# Patient Record
Sex: FEMALE | Race: WHITE | NOT HISPANIC OR LATINO | Employment: OTHER | URBAN - METROPOLITAN AREA
[De-identification: names, ages, dates, MRNs, and addresses within clinical notes are randomized per-mention and may not be internally consistent; named-entity substitution may affect disease eponyms.]

---

## 2017-01-03 ENCOUNTER — OFFICE VISIT (OUTPATIENT)
Dept: HEMATOLOGY ONCOLOGY | Facility: MEDICAL CENTER | Age: 77
End: 2017-01-03
Payer: MEDICARE

## 2017-01-03 ENCOUNTER — OUTPATIENT INFUSION SERVICES (OUTPATIENT)
Dept: ONCOLOGY | Facility: MEDICAL CENTER | Age: 77
End: 2017-01-03
Attending: INTERNAL MEDICINE
Payer: MEDICARE

## 2017-01-03 VITALS
SYSTOLIC BLOOD PRESSURE: 171 MMHG | TEMPERATURE: 98.2 F | OXYGEN SATURATION: 96 % | RESPIRATION RATE: 18 BRPM | WEIGHT: 167.77 LBS | HEART RATE: 93 BPM | BODY MASS INDEX: 27.95 KG/M2 | HEIGHT: 65 IN | DIASTOLIC BLOOD PRESSURE: 81 MMHG

## 2017-01-03 VITALS
HEIGHT: 65 IN | SYSTOLIC BLOOD PRESSURE: 142 MMHG | TEMPERATURE: 97.7 F | OXYGEN SATURATION: 98 % | HEART RATE: 107 BPM | DIASTOLIC BLOOD PRESSURE: 82 MMHG | BODY MASS INDEX: 28.62 KG/M2 | RESPIRATION RATE: 16 BRPM | WEIGHT: 171.8 LBS

## 2017-01-03 VITALS
BODY MASS INDEX: 27.95 KG/M2 | TEMPERATURE: 98.2 F | DIASTOLIC BLOOD PRESSURE: 69 MMHG | HEIGHT: 65 IN | WEIGHT: 167.77 LBS | RESPIRATION RATE: 18 BRPM | SYSTOLIC BLOOD PRESSURE: 137 MMHG | OXYGEN SATURATION: 96 % | HEART RATE: 82 BPM

## 2017-01-03 DIAGNOSIS — C20 RECTAL CANCER (HCC): ICD-10-CM

## 2017-01-03 DIAGNOSIS — C18.9 MALIGNANT NEOPLASM OF COLON, UNSPECIFIED PART OF COLON (HCC): ICD-10-CM

## 2017-01-03 DIAGNOSIS — C78.7 SECONDARY MALIGNANT NEOPLASM OF LIVER (HCC): ICD-10-CM

## 2017-01-03 DIAGNOSIS — R94.4 DECREASED GFR: ICD-10-CM

## 2017-01-03 LAB
ALBUMIN SERPL BCP-MCNC: 3.9 G/DL (ref 3.2–4.9)
ALBUMIN/GLOB SERPL: 1.6 G/DL
ALP SERPL-CCNC: 106 U/L (ref 30–99)
ALT SERPL-CCNC: 21 U/L (ref 2–50)
ANION GAP SERPL CALC-SCNC: 9 MMOL/L (ref 0–11.9)
AST SERPL-CCNC: 22 U/L (ref 12–45)
BASOPHILS # BLD AUTO: 1.2 % (ref 0–1.8)
BASOPHILS # BLD: 0.03 K/UL (ref 0–0.12)
BILIRUB SERPL-MCNC: 0.6 MG/DL (ref 0.1–1.5)
BUN SERPL-MCNC: 15 MG/DL (ref 8–22)
CALCIUM SERPL-MCNC: 9.8 MG/DL (ref 8.5–10.5)
CEA SERPL-MCNC: 3.3 NG/ML (ref 0–3)
CHLORIDE SERPL-SCNC: 108 MMOL/L (ref 96–112)
CO2 SERPL-SCNC: 22 MMOL/L (ref 20–33)
CREAT SERPL-MCNC: 0.99 MG/DL (ref 0.5–1.4)
EOSINOPHIL # BLD AUTO: 0.12 K/UL (ref 0–0.51)
EOSINOPHIL NFR BLD: 4.7 % (ref 0–6.9)
ERYTHROCYTE [DISTWIDTH] IN BLOOD BY AUTOMATED COUNT: 50.4 FL (ref 35.9–50)
GFR SERPL CREATININE-BSD FRML MDRD: 54 ML/MIN/1.73 M 2
GLOBULIN SER CALC-MCNC: 2.4 G/DL (ref 1.9–3.5)
GLUCOSE SERPL-MCNC: 125 MG/DL (ref 65–99)
HCT VFR BLD AUTO: 39.8 % (ref 37–47)
HGB BLD-MCNC: 13.1 G/DL (ref 12–16)
LYMPHOCYTES # BLD AUTO: 0.82 K/UL (ref 1–4.8)
LYMPHOCYTES NFR BLD: 32.2 % (ref 22–41)
MCH RBC QN AUTO: 30.6 PG (ref 27–33)
MCHC RBC AUTO-ENTMCNC: 32.9 G/DL (ref 33.6–35)
MCV RBC AUTO: 93 FL (ref 81.4–97.8)
MONOCYTES # BLD AUTO: 0.49 K/UL (ref 0–0.85)
MONOCYTES NFR BLD AUTO: 19.2 % (ref 0–13.4)
NEUTROPHILS # BLD AUTO: 1.09 K/UL (ref 2–7.15)
NEUTROPHILS NFR BLD: 42.7 % (ref 44–72)
PLATELET # BLD AUTO: 153 K/UL (ref 164–446)
PMV BLD AUTO: 9.6 FL (ref 9–12.9)
POTASSIUM SERPL-SCNC: 3.5 MMOL/L (ref 3.6–5.5)
PROT SERPL-MCNC: 6.3 G/DL (ref 6–8.2)
RBC # BLD AUTO: 4.28 M/UL (ref 4.2–5.4)
SODIUM SERPL-SCNC: 139 MMOL/L (ref 135–145)
WBC # BLD AUTO: 2.6 K/UL (ref 4.8–10.8)

## 2017-01-03 PROCEDURE — A9270 NON-COVERED ITEM OR SERVICE: HCPCS | Performed by: NURSE PRACTITIONER

## 2017-01-03 PROCEDURE — 99213 OFFICE O/P EST LOW 20 MIN: CPT | Performed by: NURSE PRACTITIONER

## 2017-01-03 PROCEDURE — G0498 CHEMO EXTEND IV INFUS W/PUMP: HCPCS

## 2017-01-03 PROCEDURE — 700111 HCHG RX REV CODE 636 W/ 250 OVERRIDE (IP): Performed by: NURSE PRACTITIONER

## 2017-01-03 PROCEDURE — 700111 HCHG RX REV CODE 636 W/ 250 OVERRIDE (IP)

## 2017-01-03 PROCEDURE — 96368 THER/DIAG CONCURRENT INF: CPT

## 2017-01-03 PROCEDURE — 96367 TX/PROPH/DG ADDL SEQ IV INF: CPT

## 2017-01-03 PROCEDURE — 82378 CARCINOEMBRYONIC ANTIGEN: CPT

## 2017-01-03 PROCEDURE — 700102 HCHG RX REV CODE 250 W/ 637 OVERRIDE(OP): Performed by: NURSE PRACTITIONER

## 2017-01-03 PROCEDURE — 96375 TX/PRO/DX INJ NEW DRUG ADDON: CPT

## 2017-01-03 PROCEDURE — A4212 NON CORING NEEDLE OR STYLET: HCPCS

## 2017-01-03 PROCEDURE — 96409 CHEMO IV PUSH SNGL DRUG: CPT

## 2017-01-03 PROCEDURE — 85025 COMPLETE CBC W/AUTO DIFF WBC: CPT

## 2017-01-03 PROCEDURE — 80053 COMPREHEN METABOLIC PANEL: CPT

## 2017-01-03 PROCEDURE — 306637 HCHG MISC ORTHO ITEM RC 0274

## 2017-01-03 RX ORDER — DEXTROSE MONOHYDRATE 50 MG/ML
INJECTION, SOLUTION INTRAVENOUS CONTINUOUS
Status: DISCONTINUED | OUTPATIENT
Start: 2017-01-03 | End: 2017-01-03 | Stop reason: HOSPADM

## 2017-01-03 RX ORDER — DEXAMETHASONE SODIUM PHOSPHATE 4 MG/ML
8 INJECTION, SOLUTION INTRA-ARTICULAR; INTRALESIONAL; INTRAMUSCULAR; INTRAVENOUS; SOFT TISSUE ONCE
Status: COMPLETED | OUTPATIENT
Start: 2017-01-03 | End: 2017-01-03

## 2017-01-03 RX ORDER — POTASSIUM CHLORIDE 1.5 G/1.58G
40 POWDER, FOR SOLUTION ORAL ONCE
Status: COMPLETED | OUTPATIENT
Start: 2017-01-03 | End: 2017-01-03

## 2017-01-03 RX ADMIN — LEUCOVORIN CALCIUM 748 MG: 350 INJECTION, POWDER, LYOPHILIZED, FOR SOLUTION INTRAMUSCULAR; INTRAVENOUS at 10:36

## 2017-01-03 RX ADMIN — POTASSIUM CHLORIDE 40 MEQ: 1.5 POWDER, FOR SOLUTION ORAL at 10:28

## 2017-01-03 RX ADMIN — DEXAMETHASONE SODIUM PHOSPHATE 8 MG: 4 INJECTION, SOLUTION INTRAMUSCULAR; INTRAVENOUS at 10:25

## 2017-01-03 RX ADMIN — HEPARIN 500 UNITS: 100 SYRINGE at 08:37

## 2017-01-03 RX ADMIN — DEXTROSE MONOHYDRATE: 50 INJECTION, SOLUTION INTRAVENOUS at 09:56

## 2017-01-03 RX ADMIN — FLUOROURACIL 3590 MG: 50 INJECTION, SOLUTION INTRAVENOUS at 12:55

## 2017-01-03 RX ADMIN — FLUOROURACIL 600 MG: 50 INJECTION, SOLUTION INTRAVENOUS at 11:58

## 2017-01-03 ASSESSMENT — ENCOUNTER SYMPTOMS
FEVER: 0
SHORTNESS OF BREATH: 1
COUGH: 0
WEIGHT LOSS: 0
VOMITING: 0
DIARRHEA: 0
TINGLING: 1
NAUSEA: 0
PALPITATIONS: 0
CONSTIPATION: 0
HEADACHES: 0
DIZZINESS: 0
BACK PAIN: 0
WHEEZING: 0
INSOMNIA: 0
CHILLS: 0

## 2017-01-03 NOTE — MR AVS SNAPSHOT
"        Karlene Gilbert Romeo   1/3/2017 9:00 AM   Office Visit   MRN: [id number]    Department:  [facility]   Dept Phone:  [phone]    Description:  Female : 1940   Provider:  Ella De A.P.N.           Reason for Visit     Follow-Up           Allergies as of 1/3/2017     Allergen Noted Reactions    Codeine 2011       \"gets drunk\"    Oxaliplatin 10/27/2015   Anaphylaxis    Pcn [Penicillins] 2011   Itching    Sulfa Drugs 2011   Itching    Tape 2013   Rash    PAPER TAPE OK      You were diagnosed with     Rectal cancer (HCC)   [848715]       Secondary malignant neoplasm of liver (HCC)   [197.7.ICD-9-CM]         Vital Signs     Blood Pressure Pulse Temperature Respirations Height Weight    142/82 mmHg 107 36.5 °C (97.7 °F) 16 1.651 m (5' 5\") 77.928 kg (171 lb 12.8 oz)    Body Mass Index Oxygen Saturation Smoking Status             28.59 kg/m2 98% Never Smoker          Basic Information     Date Of Birth Sex Race Ethnicity Preferred Language    1940 Female White Non- English      Your appointments     2017 10:00 AM   Est Chemo 3 with RN 2   Infusion Services ([facility])    [address]  [place] [address]   [phone]            2017 10:30 AM   FOLLOW UP with Ahsan Haque M.D.   [facility] (--)    [address] [place] [address] [address]  [place] [address]   [phone]            2017 10:30 AM   EST Port Flush / Central Line Care with INFUSION QUICK INJECT   Infusion Services ([facility])    [address] [facility] [address]  [place] [address]   [phone]            2017  9:00 AM   Est Chemo 3 with RN 1   Infusion Services ([facility])    [address]  [place] [address]   [phone]            2017 10:00 AM   EST Port Flush / Central Line Care with INFUSION QUICK INJECT   Infusion Services ([facility])    [address]  [place] [address]   [phone]           "    Jan 31, 2017  9:00 AM   Lab Draw with INFUSION QUICK INJECT   Infusion Services ([facility])    [address] [facility] [address]  [place] [address]   [phone]            Jan 31, 2017  9:40 AM   ONCOLOGY EST PATIENT 30 MIN with Radha Cotter M.D.   [facility] (--)    [address], [address]  [facility] [address]   [phone]            Jan 31, 2017 10:30 AM   Est Chemo 3 with RN 4   Infusion Services ([facility])    [address]  [place] [address]   [phone]            Feb 02, 2017 11:00 AM   EST Port Flush / Central Line Care with INFUSION QUICK INJECT   Infusion Services ([facility])    [address] [address]   [phone]            Feb 14, 2017  9:30 AM   Est Chemo 3 with RN 4   Infusion Services ([facility])    [address] [address]   [phone]            Feb 16, 2017 10:30 AM   EST Port Flush / Central Line Care with INFUSION QUICK INJECT   Infusion Services ([facility])    [address]  [place] [address]   [phone]            Feb 28, 2017  9:00 AM   Lab Draw with INFUSION QUICK INJECT   Infusion Services ([facility])    [address] [address]   [phone]            Feb 28, 2017  9:40 AM   ONCOLOGY EST PATIENT 30 MIN with Radha Cotter M.D.   [facility] (--)    [address], [address]  [facility] [address]   [phone]            Feb 28, 2017 10:30 AM   Est Chemo 3 with RN 4   Infusion Services ([facility])    [address] [place] [address]   [phone]            Mar 02, 2017 11:30 AM   EST Port Flush / Central Line Care with INFUSION QUICK INJECT   Infusion Services ([facility])    [address]  [facility] [address]   [phone]            Mar 14, 2017  9:30 AM   Est Chemo 3 with RN 6   Infusion Services ([address])    [address] [place] [address]   [phone]            Mar 16, 2017 10:30 AM   EST Port Flush / Central Line Care with INFUSION QUICK  INJECT   Infusion Services ([facility])    [address] [facility] [address]  [place] [address]   [phone]            Mar 22, 2017  2:30 PM   ANNUAL EXAM PREVENTATIVE with Manan Quiñonez M.D.   [facility] [address]    [address]  [place] [address]   [phone]            Mar 28, 2017  8:30 AM   Lab Draw with INFUSION QUICK INJECT   Infusion Services ([facility])    [address] [facility] [address]  [place] [address]   [phone]            Mar 28, 2017  9:00 AM   ONCOLOGY EST PATIENT 30 MIN with Radha Cotter M.D.   [facility] (--)    [address]  [place] [address]   [phone]            Mar 28, 2017 10:00 AM   Est Chemo 3 with RN 2   Infusion Services ([facility])    [address] [facility] [address]  [place] [address]   [phone]            Mar 30, 2017 11:00 AM   EST Port Flush / Central Line Care with INFUSION QUICK INJECT   Infusion Services ([facility])    [address] [facility] [address]  [place] [address]   [phone]              Problem List              ICD-10-CM Priority Class Noted - Resolved    Rectal bleeding K62.5   9/20/2012 - Present    Rectal cancer (HCC) C20   9/26/2012 - Present    Hypertension I10 Medium  10/1/2012 - Present    Anemia D64.9   10/1/2012 - Present    Hypoalbuminemia E88.09   10/1/2012 - Present    Ileostomy care (HCC) Z43.2   10/13/2012 - Present    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9   8/24/2015 - Present    Diarrhea R19.7   8/24/2015 - Present    Hyperlipidemia E78.5   9/17/2015 - Present    Lightheadedness R42   9/17/2015 - Present    Shortness of breath R06.02 High  10/5/2015 - Present    Routine general medical examination at a health care facility Z00.00   3/14/2016 - Present    Renal insufficiency N28.9   3/14/2016 - Present    Hyperglycemia R73.9   3/14/2016 - Present    Decreased GFR R94.4   8/16/2016 - Present    Chemotherapy-induced neutropenia (HCC) D70.1   9/28/2016 - Present      Health Maintenance        Date Due Completion Dates    IMM DTaP/Tdap/Td Vaccine (1 - Tdap) 4/13/1959 ---    COLONOSCOPY 4/13/1990 ---    BONE DENSITY 4/13/2005 ---    IMM PNEUMOCOCCAL 65+ (ADULT) LOW/MEDIUM RISK SERIES (2 of 2 - PCV13) 9/5/2007 9/5/2006    IMM INFLUENZA (1) 9/1/2016 10/18/2014, 9/5/2013, 10/12/2012            Current Immunizations     Influenza TIV (IM) 9/5/2013  6:12 AM, 10/12/2012  6:09 PM    Influenza Vaccine Quad Inj (Preserved) 10/18/2014    Pneumococcal polysaccharide vaccine (PPSV-23) 9/5/2006    SHINGLES VACCINE 1/14/2015      Below and/or attached are the medications your provider expects you to take. Review all of your home medications and newly ordered medications with your provider and/or pharmacist. Follow medication instructions as directed by your provider and/or pharmacist. Please keep your medication list with you and share with your provider. Update the information when medications are discontinued, doses are changed, or new medications (including over-the-counter products) are added; and carry medication information at all times in the event of emergency situations     Allergies:  CODEINE - (reactions not documented)     OXALIPLATIN - Anaphylaxis     PCN - Itching     SULFA DRUGS - Itching     TAPE - Rash               Medications  Valid as of: January 03, 2017 -  9:49 AM    Generic Name Brand Name Tablet Size Instructions for use     Cholecalciferol (Cap) Vitamin D3 400 UNITS Take 1 Cap by mouth every day.        Cyanocobalamin (Tab) VITAMIN B-12 500 MCG Take 500 mcg by mouth every day.        Famotidine (Tab) PEPCID 40 MG TAKE ONE TABLET EVERY DAY        Ibuprofen (Tab) MOTRIN 200 MG Take 200 mg by mouth every 6 hours as needed.        Lidocaine-Prilocaine (Cream) EMLA 2.5-2.5 % APPLY A THICK FILM OVER THE PORT ACCESS SITE PRIOR TO ACCESS        Loperamide HCl (Liquid) IMODIUM 1 MG/5ML Take 2 mg by mouth 4 times a day as needed for Diarrhea.        Loratadine (Tab) CLARITIN 10 MG Take 10 mg by mouth every day. Indications: Hayfever        Multiple Vitamins-Minerals   Take  by mouth.        Ondansetron HCl (Tab) ZOFRAN 4 MG         OxyCODONE HCl (Tab) ROXICODONE 5 MG Take 5 mg by mouth every four hours as needed for Severe Pain.        RaNITidine HCl (Tab) ZANTAC 150 MG TAKE ONE TABLET TWO TIMES A DAY        Rivaroxaban (Tab) XARELTO 20 MG Take 1 Tab by mouth with dinner.        .                 Medicines prescribed today were sent to:     [facility] - [place], [place] - [address]    [address] [facility] [address]    Phone: [phone] Fax: [phone]    Open 24 Hours?: No    [id number] - [place], [place] - [address]    [address] [facility] [address] [place] [address]    Phone: [phone] Fax: [phone]    Open 24 Hours?: No    [facility] - [place], [place] - [address]    [address] [facility] [place] [address]    Phone: [phone] Fax: [phone]    Open 24 Hours?: No      Medication refill instructions:       If your prescription bottle indicates you have medication refills left, it is not necessary to call your provider’s office. Please contact your pharmacy and they will refill your medication.    If your prescription bottle indicates you do not have any refills left, you may request refills at any time through one of the following ways: The online payworks system (except  Urgent Care), by calling your provider’s office, or by asking your pharmacy to contact your provider’s office with a refill request. Medication refills are processed only during regular business hours and may not be available until the next business day. Your provider may request additional information or to have a follow-up visit with you prior to refilling your medication.   *Please Note: Medication refills are assigned a new Rx number when refilled electronically. Your pharmacy may indicate that no refills were authorized even though a new prescription for the same medication is available at the pharmacy. Please request the medicine by name with the pharmacy before contacting your provider for a refill.        Referral     A referral request has been sent to our patient care coordination department. Please allow 3-5 business days for us to process this request and contact you either by phone or mail. If you do not hear from us by the 5th business day, please call us at [phone].           [employer] Access Code: [email or web address]  Expires: 1/5/2017  8:36 AM    [employer]  A secure, online tool to manage your health information     [employer]’s [employer]® is a secure, online tool that connects you to your personalized health information from the privacy of your home -- day or night - making it very easy for you to manage your healthcare. Once the activation process is completed, you can even access your medical information using the [employer] bart, which is available for free in the Apple Bart store or Google Play store.     [employer] provides the following levels of access (as shown below):   My Chart Features   [facility] Doctor [facility]  Specialists [facility]  Urgent  Care Non-[facility]  Primary Care  Doctor   Email your healthcare team securely and privately 24/7 X X X    Manage appointments: schedule your next appointment; view details of past/upcoming appointments X      Request prescription refills. X      View  recent personal medical records, including lab and immunizations X X X X   View health record, including health history, allergies, medications X X X X   Read reports about your outpatient visits, procedures, consult and ER notes X X X X   See your discharge summary, which is a recap of your hospital and/or ER visit that includes your diagnosis, lab results, and care plan. X X       How to register for [employer]:  1. Go to  [email or web address].  2. Click on the Sign Up Now box, which takes you to the New Member Sign Up page. You will need to provide the following information:  a. Enter your [employer] Access Code exactly as it appears at the top of this page. (You will not need to use this code after you’ve completed the sign-up process. If you do not sign up before the expiration date, you must request a new code.)   b. Enter your date of birth.   c. Enter your home email address.   d. Click Submit, and follow the next screen’s instructions.  3. Create a [employer] ID. This will be your [employer] login ID and cannot be changed, so think of one that is secure and easy to remember.  4. Create a [employer] password. You can change your password at any time.  5. Enter your Password Reset Question and Answer. This can be used at a later time if you forget your password.   6. Enter your e-mail address. This allows you to receive e-mail notifications when new information is available in [employer].  7. Click Sign Up. You can now view your health information.    For assistance activating your [employer] account, call [phone]

## 2017-01-03 NOTE — PROGRESS NOTES
Pt here for labs before chemo.  Port accessed in sterile field, labs drawn.  Confirmed with APN Alsop to draw CBC, CMP, CEA.  Port flushed per policy and remains accessed for chemo later. Pt left IC, will return after MD simpson

## 2017-01-03 NOTE — PROGRESS NOTES
Pt here for 5 FU and pump hook up.  Pt is now using [facility] pump with IC RN hooking up continuous pump.  Labs from earlier reviewed with APN Alsop.  K replaced with potassium powder as pills go through pt ostomy.  Leucovorin infused with 5 FU push during infusion.  Pt hooked up to new pump, confirmed pump is running.  Pt states she will drop off her [employer] pump tomorrow.  Pt left IC with .  Next apt confirmed.

## 2017-01-03 NOTE — PROGRESS NOTES
"Pharmacy Chemotherapy Verification:    Patient Name: Karlene Walters   Dx: Colon Cancer    Protocol: 5 FU/Leucovorin     Leucovorin 400 mg/m2 IV over 2 hours on day 1 followed by  Fluorouracil 400 mg/m2 IV puse on day 1 followed by   Dose reduced by 20%(320 mg/m2) starting Cycle 18 ([facility] #11) 7/5/16 due to neutropenia   Fluorouracil 1200 mg/m2 IV continuous infusion daily on days 1 and 2 (2400 mg/m2 IV over 46-48 hours)    Infusion Dose reduced by 20% starting Cycle 18 ([facility] #11)  7/5/16 due to neutropenia    14 day cycles for 12 cycles (adjuvant) or until disease progression or unacceptable toxicity  NCCN Guidelines for Colon Cancer.V.2.2015  Jay T, et al. Eur J Cancer.1999;35(9):1343-7    Allergies:  Codeine; Pcn; Sulfa drugs; and Tape     /69 mmHg  Pulse 82  Temp(Src) 36.8 °C (98.2 °F)  Resp 18  Ht 1.651 m (5' 5\")  Wt 76.1 kg (167 lb 12.3 oz)  BMI 27.92 kg/m2  SpO2 96% Body surface area is 1.87 meters squared.     Labs 1/3/17  ANC~ 1090 Plt = 153k   Hgb = 13.1   SCr = 0.99 mg/dL CrCl ~ 58 mL/min   12/5/16: LFT's = WNL except  T bili = 0.6      Drug Order   (Drug name, dose, route, IV Fluid & volume, frequency, number of doses) Cycle: 22   Previous treatment: 12/20/16     Medication = Leucovorin  Base Dose= 400 mg/m2  Calc Dose: Base Dose x 1.87m2 = 748 mg  Final Dose = 748 mg  Route = IV  Fluid & Volume = D5W 250 mL  Admin Duration = Over 2 hours          <5% difference, OK to treat with final dose   Medication = Fluorouracil  Base Dose= 320 mg/m2   Calc Dose: Base Dose x 1.87m2 = 598.4mg  Final Dose = 600mg  Route = IV push  Conc & Volume = 50 mg/mL = 12mL  Admin Duration = Over 5 minutes   Given 1 hour into leucovorin infusion       <5% difference, OK to treat with final dose   Medication = Fluorouracil  Base Dose= 1920 mg/m2  Calc Dose:Base Dose x 1.87 = 3590.4 mg  Final Dose = 3590  Route = IV  Fluid & Volume = 71.8 mL undiluted drug  Admin Duration = Over 46 hours   In CADD " cassette for home CIVI       <5% difference, OK to treat with final dose       By my signature below, I confirm this process was performed independently with the BSA and all final chemotherapy dosing calculations congruent. I have reviewed the above chemotherapy order and that my calculation of the final dose and BSA (when applicable) corroborate those calculations of the  pharmacist. Discrepancies of 5% or greater in the written dose have been addressed and documented within the EPIC Progress notes.    Andre Paz, MigueD

## 2017-01-03 NOTE — PROGRESS NOTES
"Subjective:      Karlene Walters is a 76 y.o. female who presents for Follow-Up for rectal cancer.         HPI    Patient seen today in follow up for rectal cancer mets to liver.  She is here for cycle 22, day 1 of single agent 5FU.  Patient is accompanied by her  for today's visit.     Saw Dr. ROJAS on Dec. 20th. Recommendation to have scans every 6 months of her liver.     Fever - None  Chills - None  Appetite - Appetite has been good. She is eating well. Her weight is stable.   Fatigue - None. She does feel like she is getting better and is not getting tired as often. She is doing light exercise about 3 times a day at about 5 minutes at a time.   N/V - None  Constipation/Diarrhea - Normal BMs per patient's routine. Her output is stable via ostomy. She uses Imodium as needed.   Pain - None. Pain in her hip has resolved at this time.   Neuropathy - Very mild in her toes or feet at times, but it is intermittent.   Other - Denies mouth sores. Very mild cracking and peeling of the skin around her fingers and erythema noted to her hands. But it is stable.     Her BP has been elevated today at the infusion center at 171/81. Today in the office it is 142/82. She states she is monitoring yifan BPs at home and they have been around 130/70s.     Allergies   Allergen Reactions   • Codeine      \"gets drunk\"   • Oxaliplatin Anaphylaxis   • Pcn [Penicillins] Itching   • Sulfa Drugs Itching   • Tape Rash     PAPER TAPE OK     Current Outpatient Prescriptions on File Prior to Visit   Medication Sig Dispense Refill   • famotidine (PEPCID) 40 MG Tab TAKE ONE TABLET EVERY DAY 90 Tab 1   • ranitidine (ZANTAC) 150 MG Tab TAKE ONE TABLET TWO TIMES A DAY 60 Tab 5   • rivaroxaban (XARELTO) 20 MG Tab tablet Take 1 Tab by mouth with dinner. 30 Tab 6   • cyanocobalamin (VITAMIN B-12) 500 MCG Tab Take 500 mcg by mouth every day.     • Multiple Vitamins-Minerals (CENTRUM SILVER PO) Take  by mouth.     • Cholecalciferol (VITAMIN D3) 400 " "UNITS CAPS Take 1 Cap by mouth every day.     • loratadine (CLARITIN) 10 MG TABS Take 10 mg by mouth every day. Indications: Hayfever     • lidocaine-prilocaine (EMLA) 2.5-2.5 % Cream APPLY A THICK FILM OVER THE PORT ACCESS SITE PRIOR TO ACCESS 30 g 2   • loperamide (IMODIUM) 1 MG/5ML Liquid Take 2 mg by mouth 4 times a day as needed for Diarrhea.     • ondansetron (ZOFRAN) 4 MG Tab tablet      • ibuprofen (MOTRIN) 200 MG Tab Take 200 mg by mouth every 6 hours as needed.     • oxycodone immediate-release (ROXICODONE) 5 MG Tab Take 5 mg by mouth every four hours as needed for Severe Pain.       No current facility-administered medications on file prior to visit.       Review of Systems   Constitutional: Negative for fever, chills, weight loss and malaise/fatigue.   Respiratory: Positive for shortness of breath (SOB only when she is in the cold air). Negative for cough and wheezing.    Cardiovascular: Negative for chest pain, palpitations and leg swelling.        Feels like her hands and wrists are swollen at times. She thinks she is \"puffy.\"   Gastrointestinal: Negative for nausea, vomiting, diarrhea and constipation.   Genitourinary: Negative for dysuria.   Musculoskeletal: Negative for back pain and joint pain.   Skin: Negative for itching and rash.   Neurological: Positive for tingling (very mild at times). Negative for dizziness and headaches.   Psychiatric/Behavioral: The patient does not have insomnia.           Objective:     /82 mmHg  Pulse 107  Temp(Src) 36.5 °C (97.7 °F)  Resp 16  Ht 1.651 m (5' 5\")  Wt 77.928 kg (171 lb 12.8 oz)  BMI 28.59 kg/m2  SpO2 98%     Physical Exam   Constitutional: She is oriented to person, place, and time. She appears well-developed and well-nourished. No distress.   HENT:   Head: Normocephalic and atraumatic.   Mouth/Throat: Oropharynx is clear and moist. No oropharyngeal exudate.   Eyes: Conjunctivae and EOM are normal. Pupils are equal, round, and reactive to " light. Right eye exhibits no discharge. Left eye exhibits no discharge. No scleral icterus.   Cardiovascular: Normal rate, regular rhythm, normal heart sounds and intact distal pulses.  Exam reveals no gallop and no friction rub.    No murmur heard.  Pulmonary/Chest: Effort normal and breath sounds normal. No respiratory distress. She has no wheezes.   Abdominal: Soft. Bowel sounds are normal. She exhibits no distension. There is no tenderness.   Musculoskeletal: Normal range of motion. She exhibits no edema or tenderness.   Neurological: She is alert and oriented to person, place, and time.   Skin: Skin is warm and dry. She is not diaphoretic.   Psychiatric: She has a normal mood and affect. Her behavior is normal.   Vitals reviewed.      Outpatient Infusion Services on 01/03/2017   Component Date Value Ref Range Status   • WBC 01/03/2017 2.6* 4.8 - 10.8 K/uL Final   • RBC 01/03/2017 4.28  4.20 - 5.40 M/uL Final   • Hemoglobin 01/03/2017 13.1  12.0 - 16.0 g/dL Final   • Hematocrit 01/03/2017 39.8  37.0 - 47.0 % Final   • MCV 01/03/2017 93.0  81.4 - 97.8 fL Final   • MCH 01/03/2017 30.6  27.0 - 33.0 pg Final   • MCHC 01/03/2017 32.9* 33.6 - 35.0 g/dL Final   • RDW 01/03/2017 50.4* 35.9 - 50.0 fL Final   • Platelet Count 01/03/2017 153* 164 - 446 K/uL Final   • MPV 01/03/2017 9.6  9.0 - 12.9 fL Final   • Neutrophils-Polys 01/03/2017 42.70* 44.00 - 72.00 % Final   • Lymphocytes 01/03/2017 32.20  22.00 - 41.00 % Final   • Monocytes 01/03/2017 19.20* 0.00 - 13.40 % Final   • Eosinophils 01/03/2017 4.70  0.00 - 6.90 % Final   • Basophils 01/03/2017 1.20  0.00 - 1.80 % Final   • Neutrophils (Absolute) 01/03/2017 1.09* 2.00 - 7.15 K/uL Final    Includes immature neutrophils, if present.   • Lymphs (Absolute) 01/03/2017 0.82* 1.00 - 4.80 K/uL Final   • Monos (Absolute) 01/03/2017 0.49  0.00 - 0.85 K/uL Final   • Eos (Absolute) 01/03/2017 0.12  0.00 - 0.51 K/uL Final   • Baso (Absolute) 01/03/2017 0.03  0.00 - 0.12 K/uL  Final   • Sodium 01/03/2017 139  135 - 145 mmol/L Final   • Potassium 01/03/2017 3.5* 3.6 - 5.5 mmol/L Final   • Chloride 01/03/2017 108  96 - 112 mmol/L Final   • Co2 01/03/2017 22  20 - 33 mmol/L Final   • Anion Gap 01/03/2017 9.0  0.0 - 11.9 Final   • Glucose 01/03/2017 125* 65 - 99 mg/dL Final   • Bun 01/03/2017 15  8 - 22 mg/dL Final   • Creatinine 01/03/2017 0.99  0.50 - 1.40 mg/dL Final   • Calcium 01/03/2017 9.8  8.5 - 10.5 mg/dL Final   • AST(SGOT) 01/03/2017 22  12 - 45 U/L Final   • ALT(SGPT) 01/03/2017 21  2 - 50 U/L Final   • Alkaline Phosphatase 01/03/2017 106* 30 - 99 U/L Final   • Total Bilirubin 01/03/2017 0.6  0.1 - 1.5 mg/dL Final   • Albumin 01/03/2017 3.9  3.2 - 4.9 g/dL Final   • Total Protein 01/03/2017 6.3  6.0 - 8.2 g/dL Final   • Globulin 01/03/2017 2.4  1.9 - 3.5 g/dL Final   • A-G Ratio 01/03/2017 1.6   Final   • Carcinoembryonic Antigen 01/03/2017 3.3* 0.0 - 3.0 ng/mL Final    Comment: Effective September 17, 2013 the quantitative determination  of Carcinoembryonic Antigen (CEA) will now be performed at  [facility].  The Access CEA paramagnetic  particle chemiluminescent immunoassay is used.  Results  obtained with different test methods or kits cannot be used interchangeably.  Measurement of CEA has been shown to be  clinically relevant in the management of patients with  colorectal, breast, lung, prostatic, pancreatic, and ovarian  carcinomas.  Smokers may have slightly elevated levels of CEA.     • GFR If  01/03/2017 >60  >60 mL/min/1.73 m 2 Final   • GFR If Non  01/03/2017 54* >60 mL/min/1.73 m 2 Final            Assessment/Plan:     1. Rectal cancer (HCC)     2. Secondary malignant neoplasm of liver (HCC)         Plan  1. Patient is doing very well and feeling well. I reviewed her CBC, CMP and okay to proceed with cycle 22, day one of single agent 5-FU. Patient has been receiving 5-FU at 20% reduction. I did contact [facility] who has  been providing the chemotherapy and they confirmed that the last dose of 5-FU was 1920 mg/m². There is currently a new process at [facility] and medication and pump will be supplied by [facility]. Dose on the treatment plan is at 1920 mg/m² which is a 20% reduction. We are continuing out a 20% reduction per Dr. Cotter's orders.    2.Patient with slight hypokalemia last cycle and was given replacement oral potassium. She stated she noted the pill in her ostomy bag later that afternoon after taking the medication. Patient with slight hypokalemia today at 3.5. Discussed with nurse and replace potassium via IV or crushing the potassium per protocol per pharmacy.    3. Patient blood pressure slightly elevated today in the infusion center at 171/81. In the office today she was 142/82. She states she's been checking her blood pressure at home and it has been 130s over 70s. I have asked patient to maintain a log book at home of her BPs and bring to next appointment. Patient verbalized understanding.    4. Patient to follow up in 4 weeks or sooner if needed and continue with every 2 week 5-FU as prescribed by Dr. Cotter.

## 2017-01-03 NOTE — PROGRESS NOTES
Chemotherapy Verification - PRIMARY RN      Height = 165.1 cm  Weight = 76.1 kg  BSA = 1.87 m2       Medication: Fluorouracil push  Dose: 320 mg.m2  Calculated Dose: 598.4 mg                             (In mg/m2, AUC, mg/kg)     Medication: Fluorouracil continuous  Dose: 2400 mg/m2 with 20 % reduction  Calculated Dose: 1920 mg/m2 (20% reduction)  3590.4 mg                            (In mg/m2, AUC, mg/kg)      I confirm this process was performed independently with the BSA and all final chemotherapy dosing calculations congruent.  Any discrepancies of 5% or greater have been addressed with the chemotherapy pharmacist. The resolution of the discrepancy has been documented in the EPIC progress notes.

## 2017-01-03 NOTE — PROGRESS NOTES
"Pharmacy Chemotherapy [facility]    Patient Name: Karlene Walters      Dx: Colon CA     Cycle:  29 (cycles 1-7 prior to [place]) Previous treatment: C28 on 12/20/16     Protocol: 5 FU/Leucovorin    Leucovorin 400 mg/m² IV over 2 hours on Day 1, followed by  Fluorouracil 400 mg/m²  IVP on Day 1, followed by   - Dose reduced by 20% to 320 mg/m² starting with Cycle 18 ([place] #11) on 7/5/16 due to neutropenia    Fluorouracil 1200 mg/m²  IV continuous infusion daily on Days 1 and 2 (2400 mg/m² IV over 46-48 hours) -   - Infusion Dose reduced by 20% starting with Cycle 18 ([place] #11) on 7/5/16 due to neutropenia   -20% reduction (1920 mg/m2) to be continued starting C28 per C Alsop APRN   14 day cycles for 12 cycles (adjuvant) or until disease progression or unacceptable toxicity  NCCN Guidelines for Colon Cancer. V.2.2015.  Jay T, et al. Eur J Cancer.1999;35(9):1343-7.          Allergies: Codeine; Oxaliplatin; Pcn; Sulfa drugs; and Tape  /81 mmHg  Pulse 93  Temp(Src) 36.8 °C (98.2 °F)  Resp 18  Ht 1.651 m (5' 5\")  Wt 76.1 kg (167 lb 12.3 oz)  BMI 27.92 kg/m2  SpO2 96% Body surface area is 1.87 meters squared.     Labs 1/3/17  ANC~1090--OK to treat per C Alsop APRN Plt = 153k   Hgb = 13.1   SCr = 0.99 mg/dL CrCl ~58 mL/min  AST/ALT/AP/Tbili = 22/21/106/0.6      Leucovorin 400 mg/m² x 1.87 m² = 748 mg   <5% difference, OK to treat with final dose = 748 mg IV    Fluorouracil (5-FU) 320 mg/m² x 1.87 m² = 598.4 mg   <5% difference, OK to treat with final dose = 600 mg IVP    Fluorouracil (5-FU) 1920 mg/m2 x 1.87 m2 = 3590.4 mg   <5% difference, OK to treat with final dose = 3590 mg IV continuous infusion over 46 hours    Lora Hernandez, PharmD           "

## 2017-01-03 NOTE — PROGRESS NOTES
Chemotherapy Verification - SECONDARY RN       Height = 165.1 cm  Weight = 76.1 kg  BSA = 1.868 m2       Medication: 5FU  Dose: 320 mg/m2 dr Calculated Dose: 597.8 mg                             (In mg/m2, AUC, mg/kg)     Medication: Infusional home 5FU  Dose: 1920 mg/m2 dr Calculated Dose: 3586.5 mg                             (In mg/m2, AUC, mg/kg)      I confirm that this process was performed independently.

## 2017-01-04 ENCOUNTER — OUTPATIENT INFUSION SERVICES (OUTPATIENT)
Dept: ONCOLOGY | Facility: MEDICAL CENTER | Age: 77
End: 2017-01-04
Attending: INTERNAL MEDICINE
Payer: MEDICARE

## 2017-01-04 ENCOUNTER — OFFICE VISIT (OUTPATIENT)
Dept: CARDIOLOGY | Facility: MEDICAL CENTER | Age: 77
End: 2017-01-04
Payer: MEDICARE

## 2017-01-04 VITALS
BODY MASS INDEX: 28.82 KG/M2 | WEIGHT: 173 LBS | DIASTOLIC BLOOD PRESSURE: 68 MMHG | OXYGEN SATURATION: 96 % | HEIGHT: 65 IN | SYSTOLIC BLOOD PRESSURE: 128 MMHG | HEART RATE: 90 BPM

## 2017-01-04 DIAGNOSIS — R06.02 SHORTNESS OF BREATH: ICD-10-CM

## 2017-01-04 DIAGNOSIS — N28.9 RENAL INSUFFICIENCY: ICD-10-CM

## 2017-01-04 DIAGNOSIS — R94.31 ABNORMAL EKG: ICD-10-CM

## 2017-01-04 DIAGNOSIS — E86.0 DEHYDRATION: ICD-10-CM

## 2017-01-04 DIAGNOSIS — E78.5 HYPERLIPIDEMIA, UNSPECIFIED HYPERLIPIDEMIA TYPE: ICD-10-CM

## 2017-01-04 DIAGNOSIS — I10 ESSENTIAL HYPERTENSION: ICD-10-CM

## 2017-01-04 DIAGNOSIS — R94.4 DECREASED GFR: ICD-10-CM

## 2017-01-04 PROCEDURE — 306780 HCHG STAT FOR TRANSFUSION PER CASE

## 2017-01-04 PROCEDURE — 1036F TOBACCO NON-USER: CPT | Performed by: INTERNAL MEDICINE

## 2017-01-04 PROCEDURE — G8420 CALC BMI NORM PARAMETERS: HCPCS | Performed by: INTERNAL MEDICINE

## 2017-01-04 PROCEDURE — 99214 OFFICE O/P EST MOD 30 MIN: CPT | Performed by: INTERNAL MEDICINE

## 2017-01-04 PROCEDURE — G8427 DOCREV CUR MEDS BY ELIG CLIN: HCPCS | Performed by: INTERNAL MEDICINE

## 2017-01-04 ASSESSMENT — ENCOUNTER SYMPTOMS
CLAUDICATION: 0
NEUROLOGICAL NEGATIVE: 1
SHORTNESS OF BREATH: 0
DIZZINESS: 0
STRIDOR: 0
BRUISES/BLEEDS EASILY: 0
FEVER: 0
PALPITATIONS: 0
LOSS OF CONSCIOUSNESS: 0
WEAKNESS: 0
MUSCULOSKELETAL NEGATIVE: 1
WHEEZING: 0
PND: 0
SORE THROAT: 0
SPUTUM PRODUCTION: 0
HEMOPTYSIS: 0
CHILLS: 0
COUGH: 0
CARDIOVASCULAR NEGATIVE: 1
EYES NEGATIVE: 1
RESPIRATORY NEGATIVE: 1
CONSTITUTIONAL NEGATIVE: 1
GASTROINTESTINAL NEGATIVE: 1
ORTHOPNEA: 0

## 2017-01-04 NOTE — PROGRESS NOTES
Subjective:   Karlene Walters is a 76 y.o. female who presents today as a follow-up for tachycardia and her pulmonary embolism. She continues on the oral anticoagulated. She's had no issues with this. She is No worsening shortness of breath. She continues to undergo chemotherapy through her port.    Past Medical History   Diagnosis Date   • Blood transfusion 1957   • Hypercholesteremia    • Dental disorder      dentures UPPERS AND LOWERS   • Hypertension    • Cancer (HCC) 09/2012     rectal cancer   • Obstruction      ileostomy   • Other specified symptom associated with female genital organs      HYSTERECTOMY 1993   • CATARACT      removed b/l   • Seasonal allergies    • Cold      cough   • Ileostomy in place ([facility])    • Fall    • Indigestion    • Arrhythmia    • Lightheadedness 9/17/2015   • Routine general medical examination at a health care facility 3/14/2016     3/14/16   • Renal insufficiency 3/14/2016   • Chemotherapy-induced neutropenia ([facility]) 9/28/2016     Past Surgical History   Procedure Laterality Date   • Tonsillectomy     • Abdominal hysterectomy total  1983   • Eye surgery       cataracts   • Cyst excision  1972     ovarian   • Other  2009     left eye torn retina repair   • Colonoscopy flex w/endo us  9/20/2012     Performed by Harshil Bolton M.D. at [facility]   • Colectomy  9/26/2012     Performed by Kolton Montgomery M.D. at [facility]   • Laparoscopy  10/8/2012     Performed by Kolton Montgomery M.D. at [facility]   • Ileo loop diversion  10/8/2012     Performed by Kolton Montgomery M.D. at [facility]   • Sigmoidoscopy  2/27/2013     Performed by Kolton Montgomery M.D. at [facility]   • Fistula in ano repair  2/27/2013     Performed by Kolton Montgomery M.D. at [facility]   • Proctoscopy  7/17/2013     Performed by Kolton Montgomery M.D. at [facility]   • Biopsy general  7/17/2013     Performed by Kolton Montgomery M.D. at  SURGERY [facility]   • Recovery  8/26/2013     Performed by Cath-Recovery Surgery at SURGERY SAME DAY [facility]   • Fistula in ano repair  9/4/2013     Performed by Kolton Montgomery M.D. at SURGERY [facility]   • Flap rotation  9/4/2013     Performed by Kolton Montgomery M.D. at SURGERY [facility]   • Irrigation & debridement general  10/23/2013     Performed by Kolton Montgomery M.D. at SURGERY [facility]   • Perineal procedure  12/18/2013     Performed by Kolton Montgomery M.D. at SURGERY [facility]   • Myocutaneous flap  12/18/2013     Performed by Kolton Montgomery M.D. at SURGERY [facility]   • Cataract extraction with iol  2004     bilateral   • Irrigation & debridement general  2/19/2014     Performed by Kolton Montgomery M.D. at SURGERY [facility]   • Flap graft  2/19/2014     Performed by Kolton Montgomery M.D. at SURGERY [facility]   • Fistula in ano repair  10/15/2014     Performed by Kolton Montgomery M.D. at SURGERY [facility]   • Perineal procedure  10/15/2014     Performed by Kolton Montgomery M.D. at SURGERY [facility]   • Fistula in ano repair  1/28/2015     Performed by Kolton Montgomery M.D. at SURGERY [facility]   • Perineal procedure  1/28/2015     Performed by Kolton Montgomery M.D. at SURGERY [facility]   • Hepatic ablation laparoscopic  7/6/2015     Procedure: HEPATIC ABLATION LAPAROSCOPIC.;  Surgeon: Kit West M.D.;  Location: SURGERY [facility];  Service:    • Cath placement Left 7/6/2015     Procedure: CATH PLACEMENT CEPHALIC POWERPORT;  Surgeon: Kit West M.D.;  Location: SURGERY [facility];  Service:    • Colonoscopy with biopsy  8/23/2015     Procedure: COLONOSCOPY WITH BIOPSY;  Surgeon: Wilbur Glasgow M.D.;  Location: ENDOSCOPY [facility];  Service:      Family History   Problem Relation Age of Onset   • Heart Disease Mother    • Cancer Maternal Aunt 60     breast ca   • Hypertension     • Lung Disease Brother    • Cancer  "Brother      prostate cancer     History   Smoking status   • Never Smoker    Smokeless tobacco   • Never Used     Allergies   Allergen Reactions   • Codeine      \"gets drunk\"   • Oxaliplatin Anaphylaxis   • Pcn [Penicillins] Itching   • Sulfa Drugs Itching   • Tape Rash     PAPER TAPE OK     Outpatient Encounter Prescriptions as of 1/4/2017   Medication Sig Dispense Refill   • famotidine (PEPCID) 40 MG Tab TAKE ONE TABLET EVERY DAY 90 Tab 1   • lidocaine-prilocaine (EMLA) 2.5-2.5 % Cream APPLY A THICK FILM OVER THE PORT ACCESS SITE PRIOR TO ACCESS 30 g 2   • loperamide (IMODIUM) 1 MG/5ML Liquid Take 2 mg by mouth 4 times a day as needed for Diarrhea.     • ranitidine (ZANTAC) 150 MG Tab TAKE ONE TABLET TWO TIMES A DAY 60 Tab 5   • ondansetron (ZOFRAN) 4 MG Tab tablet      • ibuprofen (MOTRIN) 200 MG Tab Take 200 mg by mouth every 6 hours as needed.     • oxycodone immediate-release (ROXICODONE) 5 MG Tab Take 5 mg by mouth every four hours as needed for Severe Pain.     • rivaroxaban (XARELTO) 20 MG Tab tablet Take 1 Tab by mouth with dinner. 30 Tab 6   • cyanocobalamin (VITAMIN B-12) 500 MCG Tab Take 500 mcg by mouth every day.     • Multiple Vitamins-Minerals (CENTRUM SILVER PO) Take  by mouth.     • Cholecalciferol (VITAMIN D3) 400 UNITS CAPS Take 1 Cap by mouth every day.     • loratadine (CLARITIN) 10 MG TABS Take 10 mg by mouth every day. Indications: Hayfever       Facility-Administered Encounter Medications as of 1/4/2017   Medication Dose Route Frequency Provider Last Rate Last Dose   • HEPARIN LOCK FLUSH 100 UNIT/ML IV SOLN        Stopped at 01/04/17 0745     Review of Systems   Constitutional: Negative.  Negative for fever, chills and malaise/fatigue.   HENT: Negative.  Negative for sore throat.    Eyes: Negative.    Respiratory: Negative.  Negative for cough, hemoptysis, sputum production, shortness of breath, wheezing and stridor.    Cardiovascular: Negative.  Negative for chest pain, palpitations, " "orthopnea, claudication, leg swelling and PND.   Gastrointestinal: Negative.    Genitourinary: Negative.    Musculoskeletal: Negative.    Skin: Negative.    Neurological: Negative.  Negative for dizziness, loss of consciousness and weakness.   Endo/Heme/Allergies: Negative.  Does not bruise/bleed easily.   All other systems reviewed and are negative.       Objective:   /68 mmHg  Pulse 90  Ht 1.651 m (5' 5\")  Wt 78.472 kg (173 lb)  BMI 28.79 kg/m2  SpO2 96%    Physical Exam   Constitutional: She is oriented to person, place, and time. She appears well-developed and well-nourished. No distress.   HENT:   Head: Normocephalic.   Mouth/Throat: Oropharynx is clear and moist.   Eyes: EOM are normal. Pupils are equal, round, and reactive to light. Right eye exhibits no discharge. Left eye exhibits no discharge. No scleral icterus.   Neck: Normal range of motion. Neck supple. No JVD present. No tracheal deviation present.   Cardiovascular: Normal rate, regular rhythm, S1 normal, S2 normal, normal heart sounds, intact distal pulses and normal pulses.  Exam reveals no gallop, no S3, no S4 and no friction rub.    No murmur heard.   No systolic murmur is present    No diastolic murmur is present   Pulses:       Carotid pulses are 2+ on the right side, and 2+ on the left side.       Radial pulses are 2+ on the right side, and 2+ on the left side.        Dorsalis pedis pulses are 2+ on the right side, and 2+ on the left side.        Posterior tibial pulses are 2+ on the right side, and 2+ on the left side.   Pulmonary/Chest: Effort normal and breath sounds normal. No respiratory distress. She has no wheezes. She has no rales.   Abdominal: Soft. Bowel sounds are normal. She exhibits no distension and no mass. There is no tenderness. There is no rebound and no guarding.   Musculoskeletal: She exhibits no edema.   Neurological: She is alert and oriented to person, place, and time. No cranial nerve deficit.   Skin: Skin is " warm and dry. She is not diaphoretic. No pallor.   Psychiatric: She has a normal mood and affect. Her behavior is normal. Judgment and thought content normal.   Nursing note and vitals reviewed.      Assessment:     1. Renal insufficiency     2. Decreased GFR     3. Shortness of breath     4. Hyperlipidemia, unspecified hyperlipidemia type     5. Dehydration     6. Abnormal EKG     7. Essential hypertension         Medical Decision Making:  Today's Assessment / Status / Plan:     76 y/o F with PE on Xarelto with normal echocardiogram. From a cardiovascular standpoint she continues to do incredibly well. I also see her back in 6 months for further evaluation. I'll not make any changes to her medications today.    1. Orthostatic tachycardia  - resolved    2. Shortness of breath with exertion and PEs, normal echo  - cont Rivaroxiban 20    Thank for you allowing me to take part in your patient's care, please call should you have any questions or would like to discuss this patient

## 2017-01-04 NOTE — Clinical Note
[facility]   [address] [place] [address]  BERNIE Carroll [address]  Phone: [phone]  Fax: [phone]              Karlene Walters  1940    Encounter Date: 1/4/2017    Ahsan Haque M.D.          PROGRESS NOTE:  Subjective:   Karlene Walters is a 76 y.o. female who presents today as a follow-up for tachycardia and her pulmonary embolism. She continues on the oral anticoagulated. She's had no issues with this. She is No worsening shortness of breath. She continues to undergo chemotherapy through her port.    Past Medical History   Diagnosis Date   • Blood transfusion 1957   • Hypercholesteremia    • Dental disorder      dentures UPPERS AND LOWERS   • Hypertension    • Cancer (HCC) 09/2012     rectal cancer   • Obstruction      ileostomy   • Other specified symptom associated with female genital organs      HYSTERECTOMY 1993   • CATARACT      removed b/l   • Seasonal allergies    • Cold      cough   • Ileostomy in place ([facility])    • Fall    • Indigestion    • Arrhythmia    • Lightheadedness 9/17/2015   • Routine general medical examination at a health care facility 3/14/2016     3/14/16   • Renal insufficiency 3/14/2016   • Chemotherapy-induced neutropenia (HCC) 9/28/2016     Past Surgical History   Procedure Laterality Date   • Tonsillectomy     • Abdominal hysterectomy total  1983   • Eye surgery       cataracts   • Cyst excision  1972     ovarian   • Other  2009     left eye torn retina repair   • Colonoscopy flex w/endo us  9/20/2012     Performed by Harshil Bolton M.D. at SURGERY [facility]   • Colectomy  9/26/2012     Performed by Kolton Montgomery M.D. at SURGERY [facility]   • Laparoscopy  10/8/2012     Performed by Kolton Montgomery M.D. at [facility]   • Ileo loop diversion  10/8/2012     Performed by Kolton Montgomery M.D. at [facility]   • Sigmoidoscopy  2/27/2013     Performed by Kolton Montgomery M.D. at [facility]  TOWER ORS   • Fistula in ano repair  2/27/2013     Performed by Kolton Montgomery M.D. at SURGERY [facility]   • Proctoscopy  7/17/2013     Performed by Kolton Montgomery M.D. at SURGERY [facility]   • Biopsy general  7/17/2013     Performed by Kolton Montgomery M.D. at SURGERY [facility]   • Recovery  8/26/2013     Performed by Cath-Recovery Surgery at SURGERY SAME DAY [facility]   • Fistula in ano repair  9/4/2013     Performed by Kolton Montgomery M.D. at SURGERY [facility]   • Flap rotation  9/4/2013     Performed by Kolton Montgomery M.D. at SURGERY [facility]   • Irrigation & debridement general  10/23/2013     Performed by Kolton Montgomery M.D. at SURGERY [facility]   • Perineal procedure  12/18/2013     Performed by Kolton Montgomery M.D. at SURGERY [facility]   • Myocutaneous flap  12/18/2013     Performed by Kolotn Montgomery M.D. at SURGERY [facility]   • Cataract extraction with iol  2004     bilateral   • Irrigation & debridement general  2/19/2014     Performed by Kolton Montgomery M.D. at SURGERY [facility]   • Flap graft  2/19/2014     Performed by Kolton Montgomery M.D. at SURGERY [facility]   • Fistula in ano repair  10/15/2014     Performed by Kolton Montgomery M.D. at SURGERY [facility]   • Perineal procedure  10/15/2014     Performed by Kolton Montgomery M.D. at SURGERY [facility]   • Fistula in ano repair  1/28/2015     Performed by Kolton Montgomery M.D. at SURGERY [facility]   • Perineal procedure  1/28/2015     Performed by Kolton Montgomery M.D. at SURGERY [facility]   • Hepatic ablation laparoscopic  7/6/2015     Procedure: HEPATIC ABLATION LAPAROSCOPIC.;  Surgeon: Kit West M.D.;  Location: [facility];  Service:    • Cath placement Left 7/6/2015     Procedure: CATH PLACEMENT CEPHALIC POWERPORT;  Surgeon: Kit West M.D.;  Location: SURGERY [facility];  Service:    • Colonoscopy with biopsy  8/23/2015     Procedure:  "COLONOSCOPY WITH BIOPSY;  Surgeon: Wilbur Glasgow M.D.;  Location: ENDOSCOPY [facility];  Service:      Family History   Problem Relation Age of Onset   • Heart Disease Mother    • Cancer Maternal Aunt 60     breast ca   • Hypertension     • Lung Disease Brother    • Cancer Brother      prostate cancer     History   Smoking status   • Never Smoker    Smokeless tobacco   • Never Used     Allergies   Allergen Reactions   • Codeine      \"gets drunk\"   • Oxaliplatin Anaphylaxis   • Pcn [Penicillins] Itching   • Sulfa Drugs Itching   • Tape Rash     PAPER TAPE OK     Outpatient Encounter Prescriptions as of 1/4/2017   Medication Sig Dispense Refill   • famotidine (PEPCID) 40 MG Tab TAKE ONE TABLET EVERY DAY 90 Tab 1   • lidocaine-prilocaine (EMLA) 2.5-2.5 % Cream APPLY A THICK FILM OVER THE PORT ACCESS SITE PRIOR TO ACCESS 30 g 2   • loperamide (IMODIUM) 1 MG/5ML Liquid Take 2 mg by mouth 4 times a day as needed for Diarrhea.     • ranitidine (ZANTAC) 150 MG Tab TAKE ONE TABLET TWO TIMES A DAY 60 Tab 5   • ondansetron (ZOFRAN) 4 MG Tab tablet      • ibuprofen (MOTRIN) 200 MG Tab Take 200 mg by mouth every 6 hours as needed.     • oxycodone immediate-release (ROXICODONE) 5 MG Tab Take 5 mg by mouth every four hours as needed for Severe Pain.     • rivaroxaban (XARELTO) 20 MG Tab tablet Take 1 Tab by mouth with dinner. 30 Tab 6   • cyanocobalamin (VITAMIN B-12) 500 MCG Tab Take 500 mcg by mouth every day.     • Multiple Vitamins-Minerals (CENTRUM SILVER PO) Take  by mouth.     • Cholecalciferol (VITAMIN D3) 400 UNITS CAPS Take 1 Cap by mouth every day.     • loratadine (CLARITIN) 10 MG TABS Take 10 mg by mouth every day. Indications: Hayfever       Facility-Administered Encounter Medications as of 1/4/2017   Medication Dose Route Frequency Provider Last Rate Last Dose   • HEPARIN LOCK FLUSH 100 UNIT/ML IV SOLN        Stopped at 01/04/17 0745     Review of Systems   Constitutional: Negative.  Negative for fever, chills " "and malaise/fatigue.   HENT: Negative.  Negative for sore throat.    Eyes: Negative.    Respiratory: Negative.  Negative for cough, hemoptysis, sputum production, shortness of breath, wheezing and stridor.    Cardiovascular: Negative.  Negative for chest pain, palpitations, orthopnea, claudication, leg swelling and PND.   Gastrointestinal: Negative.    Genitourinary: Negative.    Musculoskeletal: Negative.    Skin: Negative.    Neurological: Negative.  Negative for dizziness, loss of consciousness and weakness.   Endo/Heme/Allergies: Negative.  Does not bruise/bleed easily.   All other systems reviewed and are negative.       Objective:   /68 mmHg  Pulse 90  Ht 1.651 m (5' 5\")  Wt 78.472 kg (173 lb)  BMI 28.79 kg/m2  SpO2 96%    Physical Exam   Constitutional: She is oriented to person, place, and time. She appears well-developed and well-nourished. No distress.   HENT:   Head: Normocephalic.   Mouth/Throat: Oropharynx is clear and moist.   Eyes: EOM are normal. Pupils are equal, round, and reactive to light. Right eye exhibits no discharge. Left eye exhibits no discharge. No scleral icterus.   Neck: Normal range of motion. Neck supple. No JVD present. No tracheal deviation present.   Cardiovascular: Normal rate, regular rhythm, S1 normal, S2 normal, normal heart sounds, intact distal pulses and normal pulses.  Exam reveals no gallop, no S3, no S4 and no friction rub.    No murmur heard.   No systolic murmur is present    No diastolic murmur is present   Pulses:       Carotid pulses are 2+ on the right side, and 2+ on the left side.       Radial pulses are 2+ on the right side, and 2+ on the left side.        Dorsalis pedis pulses are 2+ on the right side, and 2+ on the left side.        Posterior tibial pulses are 2+ on the right side, and 2+ on the left side.   Pulmonary/Chest: Effort normal and breath sounds normal. No respiratory distress. She has no wheezes. She has no rales.   Abdominal: Soft. Bowel " sounds are normal. She exhibits no distension and no mass. There is no tenderness. There is no rebound and no guarding.   Musculoskeletal: She exhibits no edema.   Neurological: She is alert and oriented to person, place, and time. No cranial nerve deficit.   Skin: Skin is warm and dry. She is not diaphoretic. No pallor.   Psychiatric: She has a normal mood and affect. Her behavior is normal. Judgment and thought content normal.   Nursing note and vitals reviewed.      Assessment:     1. Renal insufficiency     2. Decreased GFR     3. Shortness of breath     4. Hyperlipidemia, unspecified hyperlipidemia type     5. Dehydration     6. Abnormal EKG     7. Essential hypertension         Medical Decision Making:  Today's Assessment / Status / Plan:     74 y/o F with PE on Xarelto with normal echocardiogram. From a cardiovascular standpoint she continues to do incredibly well. I also see her back in 6 months for further evaluation. I'll not make any changes to her medications today.    1. Orthostatic tachycardia  - resolved    2. Shortness of breath with exertion and PEs, normal echo  - cont Rivaroxiban 20    Thank for you allowing me to take part in your patient's care, please call should you have any questions or would like to discuss this patient      Manan Quiñonez M.D.  [address] [facility] [address] [place] [address]  VIA In Basket

## 2017-01-04 NOTE — PROGRESS NOTES
"Pt's home infusion pump alarming last night with message about the \"clamp.\"  She called the infusystem number on the side of the pump and they instructed her to turn pump off and call her MD.  She spoke Dr. Robbins who notified infusion.  Infusion made appt for her this morning.  Pt arrived with pump tuned off.  [facility] attempted to remove any noticeable bubbles.  Cassette reattached to pump and pump still malfunctioning.  Pump stated 63.9 reservoir volume remained and 8.5 ml had infused.  Sarah instructed RN to switch pump.  Cassette placed in new pump.  Pump turned on and restarted, all settings checked by myself and AZEB Omalley.  Pt waited approx 15 mins and pump did not alarm.  Pt left with pump running at 1.6 ml/hr.  Pt's infusion to finish at around midnight tomorrow night.  appt made for 0800 on Friday morning to be disconnected.  Pt verbalized understanding and DC'd home with her .   "

## 2017-01-04 NOTE — MR AVS SNAPSHOT
"        Karlene Gilbert Romeo   2017 10:30 AM   Office Visit   MRN: [id number]    Department:  [facility]   Dept Phone:  [phone]    Description:  Female : 1940   Provider:  Ahsan Haque M.D.           Reason for Visit     Follow-Up           Allergies as of 2017     Allergen Noted Reactions    Codeine 2011       \"gets drunk\"    Oxaliplatin 10/27/2015   Anaphylaxis    Pcn [Penicillins] 2011   Itching    Sulfa Drugs 2011   Itching    Tape 2013   Rash    PAPER TAPE OK      You were diagnosed with     Renal insufficiency   [287861]       Decreased GFR   [065782]       Shortness of breath   [786.05.ICD-9-CM]       Hyperlipidemia, unspecified hyperlipidemia type   [5079348]       Dehydration   [276.51.ICD-9-CM]       Abnormal EKG   [863166]       Essential hypertension   [0095039]         Vital Signs     Blood Pressure Pulse Height Weight Body Mass Index Oxygen Saturation    128/68 mmHg 90 1.651 m (5' 5\") 78.472 kg (173 lb) 28.79 kg/m2 96%    Smoking Status                   Never Smoker            Basic Information     Date Of Birth Sex Race Ethnicity Preferred Language    1940 Female White Non- English      Your appointments     2017  8:00 AM   EST Port Flush / Central Line Care with RN 6   Infusion Services ([facility])    [address] [place] [address]  [place] [address]   [phone]            2017  9:00 AM   Est Chemo 3 with RN 1   Infusion Services ([facility])    [address] [place] [address]  [place] [address]   [phone]            2017 10:00 AM   EST Port Flush / Central Line Care with INFUSION QUICK INJECT   Infusion [facility] ([facility])    [address] [place] [address]  [place] [address]   [phone]            2017  9:00 AM   Lab Draw with INFUSION QUICK INJECT   Infusion [facility] ([facility])    [address] [place] [address]  [place] [address]   [phone]            2017  9:40 AM   ONCOLOGY EST PATIENT 30 MIN with Radha " BRANDON Cotter M.D.   [facility] (--)    [address], [address]  [facility] [address]   [phone]            Jan 31, 2017 10:30 AM   Est Chemo 3 with RN 4   Infusion Services ([facility])    [address]  [place] [address]   [phone]            Feb 02, 2017 11:00 AM   EST Port Flush / Central Line Care with INFUSION QUICK INJECT   Infusion [facility] ([facility])    [address]  [place] [address]   [phone]            Feb 14, 2017  9:30 AM   Est Chemo 3 with RN 4   Infusion [facility] ([facility])    [address] [place] [address]   [phone]            Feb 16, 2017 10:30 AM   EST Port Flush / Central Line Care with INFUSION QUICK INJECT   Infusion [facility] ([facility])    [address] [address]   [phone]            Feb 28, 2017  9:00 AM   Lab Draw with INFUSION QUICK INJECT   [facility] ([facility])    [address] [address]   [phone]            Feb 28, 2017  9:40 AM   ONCOLOGY EST PATIENT 30 MIN with Radha Cotter M.D.   [facility] (--)    [address], [address]  [facility] [address]   [phone]            Feb 28, 2017 10:30 AM   Est Chemo 3 with RN 4   Infusion [facility] ([facility])    [address] [address]   [phone]            Mar 02, 2017 11:30 AM   EST Port Flush / Central Line Care with INFUSION QUICK INJECT   Infusion [facility] ([facility])    [address] [place] [address]   [phone]            Mar 14, 2017  9:30 AM   Est Chemo 3 with RN 6   Infusion [facility] ([facility])    [address] [address]  [place] [address]   [phone]            Mar 16, 2017 10:30 AM   EST Port Flush / Central Line Care with INFUSION QUICK INJECT   Infusion [facility] ([facility])    [address] [place] [address]   [phone]            Mar 28, 2017  8:30 AM   Lab Draw with INFUSION QUICK INJECT   [facility] ([facility])    [address] [facility]  [address]  [place] [address]   [phone]            Mar 28, 2017  9:00 AM   ONCOLOGY EST PATIENT 30 MIN with Radha Cotter M.D.   [facility] (--)    [address], [address]  [place] [address]   [phone]            Mar 28, 2017 10:00 AM   Est Chemo 3 with RN 2   Infusion Services ([facility])    [address] [facility] [address]  [place] [address]   [phone]            Mar 30, 2017 11:00 AM   EST Port Flush / Central Line Care with INFUSION QUICK INJECT   Infusion Services ([facility])    [address] [facility] [address]  [place] [address]   [phone]            Apr 14, 2017  9:00 AM   ANNUAL EXAM PREVENTATIVE with Manan Quiñonez M.D.   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   2/27/2013 - Present    Digestive-genital tract fistula, 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   7/17/2013 - Present    Abnormal EKG R94.31 High  8/12/2013 - Present    Hemorrhage of rectum and anus K62.5   10/23/2013 - Present    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   10/15/2014 - Present    Thyroid nodule E04.1   12/19/2014 - Present    Secondary malignant neoplasm of liver (HCC) C78.7   7/6/2015 - Present    Dehydration E86.0   8/14/2015 - Present    Nausea & vomiting R11.2 Medium  8/15/2015 - Present    Colon cancer (HCC) C18.9   8/16/2015 - Present    Pulmonary embolism (HCC) I26.99 High  8/18/2015 - Present    Ileitis K52.9   8/24/2015 - Present    Diarrhea R19.7   8/24/2015 - Present    Hyperlipidemia E78.5   9/17/2015 - Present    Lightheadedness R42   9/17/2015 - Present    Shortness of breath R06.02 High   10/5/2015 - Present    Routine general medical examination at a health care facility Z00.00   3/14/2016 - Present    Renal insufficiency N28.9   3/14/2016 - Present    Hyperglycemia R73.9   3/14/2016 - Present    Decreased GFR R94.4   8/16/2016 - Present    Chemotherapy-induced neutropenia (HCC) D70.1   9/28/2016 - Present      Health Maintenance        Date Due Completion Dates    IMM DTaP/Tdap/Td Vaccine (1 - Tdap) 4/13/1959 ---    COLONOSCOPY 4/13/1990 ---    BONE DENSITY 4/13/2005 ---    IMM PNEUMOCOCCAL 65+ (ADULT) LOW/MEDIUM RISK SERIES (2 of 2 - PCV13) 9/5/2007 9/5/2006    IMM INFLUENZA (1) 9/1/2016 10/18/2014, 9/5/2013, 10/12/2012            Current Immunizations     Influenza TIV (IM) 9/5/2013  6:12 AM, 10/12/2012  6:09 PM    Influenza Vaccine Quad Inj (Preserved) 10/18/2014    Pneumococcal polysaccharide vaccine (PPSV-23) 9/5/2006    SHINGLES VACCINE 1/14/2015      Below and/or attached are the medications your provider expects you to take. Review all of your home medications and newly ordered medications with your provider and/or pharmacist. Follow medication instructions as directed by your provider and/or pharmacist. Please keep your medication list with you and share with your provider. Update the information when medications are discontinued, doses are changed, or new medications (including over-the-counter products) are added; and carry medication information at all times in the event of emergency situations     Allergies:  CODEINE - (reactions not documented)     OXALIPLATIN - Anaphylaxis     PCN - Itching     SULFA DRUGS - Itching     TAPE - Rash               Medications  Valid as of: January 04, 2017 - 11:04 AM    Generic Name Brand Name Tablet Size Instructions for use    Cholecalciferol (Cap) Vitamin D3 400 UNITS Take 1 Cap by mouth every day.        Cyanocobalamin (Tab) VITAMIN B-12 500 MCG Take 500 mcg by mouth every day.        Famotidine (Tab) PEPCID 40 MG TAKE ONE TABLET EVERY DAY         Ibuprofen (Tab) MOTRIN 200 MG Take 200 mg by mouth every 6 hours as needed.        Lidocaine-Prilocaine (Cream) EMLA 2.5-2.5 % APPLY A THICK FILM OVER THE PORT ACCESS SITE PRIOR TO ACCESS        Loperamide HCl (Liquid) IMODIUM 1 MG/5ML Take 2 mg by mouth 4 times a day as needed for Diarrhea.        Loratadine (Tab) CLARITIN 10 MG Take 10 mg by mouth every day. Indications: Hayfever        Multiple Vitamins-Minerals   Take  by mouth.        Ondansetron HCl (Tab) ZOFRAN 4 MG         OxyCODONE HCl (Tab) ROXICODONE 5 MG Take 5 mg by mouth every four hours as needed for Severe Pain.        RaNITidine HCl (Tab) ZANTAC 150 MG TAKE ONE TABLET TWO TIMES A DAY        Rivaroxaban (Tab) XARELTO 20 MG Take 1 Tab by mouth with dinner.        .                 Medicines prescribed today were sent to:     [facility]  [place], [place] - [address] [facility]    [address] [facility] [place] [address]    Phone: [phone] Fax: [phone]    Open 24 Hours?: No      Medication refill instructions:       If your prescription bottle indicates you have medication refills left, it is not necessary to call your provider’s office. Please contact your pharmacy and they will refill your medication.    If your prescription bottle indicates you do not have any refills left, you may request refills at any time through one of the following ways: The online MessageOne system (except Urgent Care), by calling your provider’s office, or by asking your pharmacy to contact your provider’s office with a refill request. Medication refills are processed only during regular business hours and may not be available until the next business day. Your provider may request additional information or to have a follow-up visit with you prior to refilling your medication.   *Please Note: Medication refills are assigned a new Rx number when refilled electronically. Your pharmacy may indicate that no refills were authorized even though a new  prescription for the same medication is available at the pharmacy. Please request the medicine by name with the pharmacy before contacting your provider for a refill.           Azendoo Access Code: [email or web address]  Expires: 1/5/2017  8:36 AM    Azendoo  A secure, online tool to manage your health information     [employer]’s Azendoo® is a secure, online tool that connects you to your personalized health information from the privacy of your home -- day or night - making it very easy for you to manage your healthcare. Once the activation process is completed, you can even access your medical information using the Azendoo bart, which is available for free in the Apple Bart store or Google Play store.     Azendoo provides the following levels of access (as shown below):   My Chart Features   [facility] Doctor [facility]  Urgent  Care Non-[facility]  Primary Care  Doctor   Email your healthcare team securely and privately 24/7 X X X    Manage appointments: schedule your next appointment; view details of past/upcoming appointments X      Request prescription refills. X      View recent personal medical records, including lab and immunizations X X X X   View health record, including health history, allergies, medications X X X X   Read reports about your outpatient visits, procedures, consult and ER notes X X X X   See your discharge summary, which is a recap of your hospital and/or ER visit that includes your diagnosis, lab results, and care plan. X X       How to register for Azendoo:  1. Go to  [email or web address].  2. Click on the Sign Up Now box, which takes you to the New Member Sign Up page. You will need to provide the following information:  a. Enter your Azendoo Access Code exactly as it appears at the top of this page. (You will not need to use this code after you’ve completed the sign-up process. If you do not sign up before the expiration date, you must request a new code.)    b. Enter your date of birth.   c. Enter your home email address.   d. Click Submit, and follow the next screen’s instructions.  3. Create a VideoBurst ID. This will be your VideoBurst login ID and cannot be changed, so think of one that is secure and easy to remember.  4. Create a [employer] RF[employer]t password. You can change your password at any time.  5. Enter your Password Reset Question and Answer. This can be used at a later time if you forget your password.   6. Enter your e-mail address. This allows you to receive e-mail notifications when new information is available in VideoBurst.  7. Click Sign Up. You can now view your health information.    For assistance activating your VideoBurst account, call [phone]

## 2017-01-06 ENCOUNTER — OUTPATIENT INFUSION SERVICES (OUTPATIENT)
Dept: ONCOLOGY | Facility: MEDICAL CENTER | Age: 77
End: 2017-01-06
Attending: INTERNAL MEDICINE
Payer: MEDICARE

## 2017-01-06 VITALS
WEIGHT: 173.94 LBS | RESPIRATION RATE: 18 BRPM | OXYGEN SATURATION: 98 % | BODY MASS INDEX: 28.98 KG/M2 | TEMPERATURE: 97.6 F | HEIGHT: 65 IN | HEART RATE: 109 BPM | SYSTOLIC BLOOD PRESSURE: 150 MMHG | DIASTOLIC BLOOD PRESSURE: 89 MMHG

## 2017-01-06 DIAGNOSIS — R94.4 DECREASED GFR: ICD-10-CM

## 2017-01-06 DIAGNOSIS — C20 RECTAL CANCER (HCC): ICD-10-CM

## 2017-01-06 PROCEDURE — 96523 IRRIG DRUG DELIVERY DEVICE: CPT

## 2017-01-06 PROCEDURE — 700111 HCHG RX REV CODE 636 W/ 250 OVERRIDE (IP): Performed by: NURSE PRACTITIONER

## 2017-01-06 RX ADMIN — HEPARIN 500 UNITS: 100 SYRINGE at 08:08

## 2017-01-06 NOTE — PROGRESS NOTES
Patient seen today for port de-access (post 46 hour home infusion of 5FU).  Pump off and chemo bag empty.  Reservoir volume is 0.00ml on pump.  Cassette and battery removed. Port flushed per protocol.  Port de-accessed, needle intact, gauze dressing applied.  Patient discharged in good condition.  Returns in 2 weeks, appointment confirmed.

## 2017-01-09 DIAGNOSIS — I26.99 PULMONARY EMBOLISM, OTHER: ICD-10-CM

## 2017-01-09 DIAGNOSIS — C20 RECTAL CANCER (HCC): ICD-10-CM

## 2017-01-09 DIAGNOSIS — C78.7 SECONDARY MALIGNANT NEOPLASM OF LIVER (HCC): ICD-10-CM

## 2017-01-09 RX ORDER — FAMOTIDINE 40 MG/1
40 TABLET, FILM COATED ORAL DAILY
Qty: 90 TAB | Refills: 2 | Status: ON HOLD | OUTPATIENT
Start: 2017-01-09 | End: 2017-06-16

## 2017-01-09 RX ORDER — RANITIDINE 150 MG/1
150 TABLET ORAL 2 TIMES DAILY
Qty: 60 TAB | Refills: 3 | Status: SHIPPED | OUTPATIENT
Start: 2017-01-09 | End: 2017-05-24 | Stop reason: SDUPTHER

## 2017-01-09 NOTE — TELEPHONE ENCOUNTER
Was the patient seen in the last year in this department? Yes     Does patient have an active prescription for medications requested? Yes     Received Request Via: Patient      Please send to the above pharmacy.

## 2017-01-17 ENCOUNTER — OUTPATIENT INFUSION SERVICES (OUTPATIENT)
Dept: ONCOLOGY | Facility: MEDICAL CENTER | Age: 77
End: 2017-01-17
Attending: INTERNAL MEDICINE
Payer: MEDICARE

## 2017-01-17 VITALS
WEIGHT: 170.64 LBS | HEART RATE: 100 BPM | OXYGEN SATURATION: 100 % | BODY MASS INDEX: 28.43 KG/M2 | RESPIRATION RATE: 18 BRPM | TEMPERATURE: 97.6 F | HEIGHT: 65 IN | DIASTOLIC BLOOD PRESSURE: 72 MMHG | SYSTOLIC BLOOD PRESSURE: 130 MMHG

## 2017-01-17 DIAGNOSIS — C20 RECTAL CANCER (HCC): ICD-10-CM

## 2017-01-17 DIAGNOSIS — C78.7 SECONDARY MALIGNANT NEOPLASM OF LIVER (HCC): ICD-10-CM

## 2017-01-17 LAB
BASOPHILS # BLD AUTO: 1.2 % (ref 0–1.8)
BASOPHILS # BLD: 0.04 K/UL (ref 0–0.12)
EOSINOPHIL # BLD AUTO: 0.12 K/UL (ref 0–0.51)
EOSINOPHIL NFR BLD: 3.6 % (ref 0–6.9)
ERYTHROCYTE [DISTWIDTH] IN BLOOD BY AUTOMATED COUNT: 49.5 FL (ref 35.9–50)
HCT VFR BLD AUTO: 38.2 % (ref 37–47)
HGB BLD-MCNC: 12.8 G/DL (ref 12–16)
LYMPHOCYTES # BLD AUTO: 1.04 K/UL (ref 1–4.8)
LYMPHOCYTES NFR BLD: 30.8 % (ref 22–41)
MCH RBC QN AUTO: 30.5 PG (ref 27–33)
MCHC RBC AUTO-ENTMCNC: 33.5 G/DL (ref 33.6–35)
MCV RBC AUTO: 91 FL (ref 81.4–97.8)
MONOCYTES # BLD AUTO: 0.43 K/UL (ref 0–0.85)
MONOCYTES NFR BLD AUTO: 12.7 % (ref 0–13.4)
NEUTROPHILS # BLD AUTO: 1.75 K/UL (ref 2–7.15)
NEUTROPHILS NFR BLD: 51.7 % (ref 44–72)
PLATELET # BLD AUTO: 143 K/UL (ref 164–446)
PMV BLD AUTO: 9 FL (ref 9–12.9)
RBC # BLD AUTO: 4.2 M/UL (ref 4.2–5.4)
WBC # BLD AUTO: 3.4 K/UL (ref 4.8–10.8)

## 2017-01-17 PROCEDURE — 85025 COMPLETE CBC W/AUTO DIFF WBC: CPT

## 2017-01-17 PROCEDURE — 96375 TX/PRO/DX INJ NEW DRUG ADDON: CPT

## 2017-01-17 PROCEDURE — G0498 CHEMO EXTEND IV INFUS W/PUMP: HCPCS

## 2017-01-17 PROCEDURE — 306637 HCHG MISC ORTHO ITEM RC 0274

## 2017-01-17 PROCEDURE — 96409 CHEMO IV PUSH SNGL DRUG: CPT

## 2017-01-17 PROCEDURE — 96367 TX/PROPH/DG ADDL SEQ IV INF: CPT

## 2017-01-17 PROCEDURE — 700111 HCHG RX REV CODE 636 W/ 250 OVERRIDE (IP)

## 2017-01-17 PROCEDURE — 96366 THER/PROPH/DIAG IV INF ADDON: CPT

## 2017-01-17 PROCEDURE — A4212 NON CORING NEEDLE OR STYLET: HCPCS

## 2017-01-17 PROCEDURE — 700111 HCHG RX REV CODE 636 W/ 250 OVERRIDE (IP): Performed by: NURSE PRACTITIONER

## 2017-01-17 RX ORDER — DEXAMETHASONE SODIUM PHOSPHATE 4 MG/ML
8 INJECTION, SOLUTION INTRA-ARTICULAR; INTRALESIONAL; INTRAMUSCULAR; INTRAVENOUS; SOFT TISSUE ONCE
Status: COMPLETED | OUTPATIENT
Start: 2017-01-17 | End: 2017-01-17

## 2017-01-17 RX ADMIN — FLUOROURACIL 600 MG: 50 INJECTION, SOLUTION INTRAVENOUS at 11:58

## 2017-01-17 RX ADMIN — LEUCOVORIN CALCIUM 752 MG: 350 INJECTION, POWDER, LYOPHILIZED, FOR SOLUTION INTRAMUSCULAR; INTRAVENOUS at 09:52

## 2017-01-17 RX ADMIN — DEXAMETHASONE SODIUM PHOSPHATE 8 MG: 4 INJECTION, SOLUTION INTRAMUSCULAR; INTRAVENOUS at 09:34

## 2017-01-17 RX ADMIN — FLUOROURACIL 3610 MG: 50 INJECTION, SOLUTION INTRAVENOUS at 11:58

## 2017-01-17 NOTE — PROGRESS NOTES
Chemotherapy Verification - PRIMARY RN      Height = 165cm  Weight = 77.4kg  BSA = 1.88m2       Medication: 5FU  Dose: 320mg/m2  Calculated Dose: 602.7mg                             (In mg/m2, AUC, mg/kg)     Medication: 5FU  Dose: 1920mg/m2  Calculated Dose: 3609mg (over 46hr via CADD pump)                                                                                                                                                           I confirm this process was performed independently with the BSA and all final chemotherapy dosing calculations congruent.  Any discrepancies of 5% or greater have been addressed with the chemotherapy pharmacist. The resolution of the discrepancy has been documented in the EPIC progress notes.

## 2017-01-17 NOTE — PROGRESS NOTES
Pt arrives ambulatory to IS accompanied by spouse.  She is here for chemo and CAD pump hook up.  Port accessed in a sterile fashion.  Blood drawn as ordered and results reveiwed.  All premeds and chemo infused as ordered, pt tolerated well, no evidence of adverse effects noted or expressed.  5FU pump hooked up to pt, second RN verified settings.  Spill kit has bee given to pt on prior visit.  Reviewed home care with pump and when and who to call for malfunction etc.  Pt comfortable with pump and home care plans.  Pt dc'd to care of spouse in no apparent distress.  Returns on 1/19 for disconnect, appt confirmed.

## 2017-01-17 NOTE — PROGRESS NOTES
"Pharmacy Chemotherapy [facility]    Patient Name: Karlene Walters      Dx: Colon CA     Cycle:  30 (cycles 1-7 prior to [place])   Previous treatment: C29 on 1/3/17     Protocol: 5 FU/Leucovorin    Leucovorin 400 mg/m² IV over 2 hours on Day 1, followed by  Fluorouracil 400 mg/m²  IVP on Day 1, followed by   - Dose reduced by 20% to 320 mg/m² starting with Cycle 18 ([place] #11) on 7/5/16 due to neutropenia    Fluorouracil 1200 mg/m²  IV continuous infusion daily on Days 1 and 2 (2400 mg/m² IV over 46-48 hours) -   - Infusion Dose reduced by 20% starting with Cycle 18 ([place] #11) on 7/5/16 due to neutropenia   -20% reduction (1920 mg/m2) to be continued starting C28 per C Alsop APRN   14 day cycles for 12 cycles (adjuvant) or until disease progression or unacceptable toxicity  NCCN Guidelines for Colon Cancer. V.2.2015.  Jay T, et al. Eur J Cancer.1999;35(9):1343-7.          Allergies: Codeine; Oxaliplatin; Pcn; Sulfa drugs; and Tape  /72 mmHg  Pulse 100  Temp(Src) 36.4 °C (97.6 °F)  Resp 18  Ht 1.651 m (5' 5\")  Wt 77.4 kg (170 lb 10.2 oz)  BMI 28.40 kg/m2  SpO2 100% Body surface area is 1.88 meters squared.       ANC~ 1750 Plt = 143k   Hgb = 12.8     1/3/17:  SCr = 0.99 mg/dL CrCl ~58 mL/min  AST/ALT/AP/Tbili = 22/21/106/0.6      Leucovorin 400 mg/m² x 1.88m² = 752mg   <5% difference, OK to treat with final dose = 752mg IV    Fluorouracil (5-FU) 320 mg/m² x 1.88m² = 601.6mg   <5% difference, OK to treat with final dose = 600 mg IVP    Fluorouracil (5-FU) 1920 mg/m2 x 1.88m2 = 3609.6mg   <5% difference, OK to treat with final dose = 3610mg CIVI   over 46 hours per home infusion pump.     DARIN Arroyo PharmAngelaD.             "

## 2017-01-17 NOTE — PROGRESS NOTES
Chemotherapy Verification - SECONDARY RN       Height = 165 cm  Weight = 77.4 kg  BSA = 1.88 m2       Medication: Adrucil  Dose: 320 mg/m2  Calculated Dose: 601.6 mg                             (In mg/m2, AUC, mg/kg)     Medication: Adrucil  Dose: 1,920 mg/m2 over 46 hours  Calculated Dose: 3,609.6 mg over 46 hours                             (In mg/m2, AUC, mg/kg)        I confirm that this process was performed independently.

## 2017-01-17 NOTE — PROGRESS NOTES
"Pharmacy Chemotherapy Verification:    Patient Name: Karlene Walters   Dx: Colon Cancer    Protocol: 5 FU/Leucovorin     Leucovorin 400 mg/m2 IV over 2 hours on day 1 followed by  Fluorouracil 400 mg/m2 IV puse on day 1 followed by   Dose reduced by 20%(320 mg/m2) starting Cycle 18 ([place] #11) 7/5/16 due to neutropenia   Fluorouracil 1200 mg/m2 IV continuous infusion daily on days 1 and 2 (2400 mg/m2 IV over 46-48 hours)    Infusion Dose reduced by 20% starting Cycle 18 ([place] #11)  7/5/16 due to neutropenia    14 day cycles for 12 cycles (adjuvant) or until disease progression or unacceptable toxicity  NCCN Guidelines for Colon Cancer.V.2.2015  Jay T, et al. Eur J Cancer.1999;35(9):1343-7    Allergies:  Codeine; Pcn; Sulfa drugs; and Tape     /72 mmHg  Pulse 100  Temp(Src) 36.4 °C (97.6 °F)  Resp 18  Ht 1.651 m (5' 5\")  Wt 77.4 kg (170 lb 10.2 oz)  BMI 28.40 kg/m2  SpO2 100% Body surface area is 1.88 meters squared.     Labs 1/17/17  ANC~ 1750 Plt = 143k   Hgb = 12.8     1/3/17 SCr = 0.99 mg/dL CrCl ~ 59 mL/min   LFT's = WNL except  T bili = 0.6    Drug Order   (Drug name, dose, route, IV Fluid & volume, frequency, number of doses) Cycle: 30 ([place] C23; Cycles 1-7 prior to [place])  Previous treatment: 1/3/17   Medication = Leucovorin  Base Dose= 400 mg/m2  Calc Dose: Base Dose x 1.88 m2 = 752 mg  Final Dose = 752 mg  Route = IV  Fluid & Volume = D5W 250 mL  Admin Duration = Over 2 hours          <5% difference, OK to treat with final dose   Medication = Fluorouracil  Base Dose= 320 mg/m2   Calc Dose: Base Dose x 1.88 m2 = 602 mg  Final Dose = 600 mg  Route = IV push  Conc & Volume = 50 mg/mL = 12 mL  Admin Duration = Over 5 minutes       <5% difference, OK to treat with final dose   Medication = Fluorouracil  Base Dose= 1920 mg/m2  Calc Dose:Base Dose x 1.88 m2 = 3610 mg  Final Dose = 3610 mg  Route = IV Home infusion  Fluid & Volume 50 mg/ml;  72.2 mL undiluted drug  Admin Duration = " Over 46 hours at 1.6 ml/hr   In CADD cassette for home CIVI       <5% difference, OK to treat with final dose     By my signature below, I confirm this process was performed independently with the BSA and all final chemotherapy dosing calculations congruent. I have reviewed the above chemotherapy order and that my calculation of the final dose and BSA (when applicable) corroborate those calculations of the  pharmacist. Discrepancies of 5% or greater in the written dose have been addressed and documented within the EPIC Progress notes.    Norris Mitchell, PharmD

## 2017-01-19 ENCOUNTER — OUTPATIENT INFUSION SERVICES (OUTPATIENT)
Dept: ONCOLOGY | Facility: MEDICAL CENTER | Age: 77
End: 2017-01-19
Attending: INTERNAL MEDICINE
Payer: MEDICARE

## 2017-01-19 VITALS
DIASTOLIC BLOOD PRESSURE: 72 MMHG | OXYGEN SATURATION: 98 % | BODY MASS INDEX: 28.58 KG/M2 | HEART RATE: 97 BPM | TEMPERATURE: 97.6 F | SYSTOLIC BLOOD PRESSURE: 162 MMHG | RESPIRATION RATE: 18 BRPM | WEIGHT: 171.52 LBS | HEIGHT: 65 IN

## 2017-01-19 DIAGNOSIS — C20 RECTAL CANCER (HCC): ICD-10-CM

## 2017-01-19 PROCEDURE — 96523 IRRIG DRUG DELIVERY DEVICE: CPT

## 2017-01-19 PROCEDURE — 700111 HCHG RX REV CODE 636 W/ 250 OVERRIDE (IP): Performed by: NURSE PRACTITIONER

## 2017-01-19 RX ADMIN — HEPARIN 500 UNITS: 100 SYRINGE at 10:04

## 2017-01-19 NOTE — PROGRESS NOTES
Pt arrived to IS, ambulatory with , for pump deaccess.  Pump off and chemo bag empty.  Reservoir volume is 0 ml.  Cassette and battery removed. Port flushed and heparin locked per policy, port deaccessed. Pt left IS with no s/sx of distress. Follow up appointment confirmed.

## 2017-01-31 ENCOUNTER — OUTPATIENT INFUSION SERVICES (OUTPATIENT)
Dept: ONCOLOGY | Facility: MEDICAL CENTER | Age: 77
End: 2017-01-31
Attending: INTERNAL MEDICINE
Payer: MEDICARE

## 2017-01-31 ENCOUNTER — OFFICE VISIT (OUTPATIENT)
Dept: HEMATOLOGY ONCOLOGY | Facility: MEDICAL CENTER | Age: 77
End: 2017-01-31
Payer: MEDICARE

## 2017-01-31 VITALS
DIASTOLIC BLOOD PRESSURE: 66 MMHG | TEMPERATURE: 97.5 F | HEART RATE: 58 BPM | RESPIRATION RATE: 18 BRPM | WEIGHT: 169.09 LBS | HEIGHT: 65 IN | BODY MASS INDEX: 28.17 KG/M2 | OXYGEN SATURATION: 99 % | SYSTOLIC BLOOD PRESSURE: 137 MMHG

## 2017-01-31 VITALS
OXYGEN SATURATION: 96 % | WEIGHT: 168.65 LBS | SYSTOLIC BLOOD PRESSURE: 176 MMHG | DIASTOLIC BLOOD PRESSURE: 88 MMHG | HEART RATE: 86 BPM | RESPIRATION RATE: 16 BRPM | BODY MASS INDEX: 28.1 KG/M2 | TEMPERATURE: 97.9 F | HEIGHT: 65 IN

## 2017-01-31 VITALS — HEIGHT: 65 IN | BODY MASS INDEX: 28.17 KG/M2 | WEIGHT: 169.09 LBS

## 2017-01-31 DIAGNOSIS — C18.9 MALIGNANT NEOPLASM OF COLON, UNSPECIFIED PART OF COLON (HCC): ICD-10-CM

## 2017-01-31 DIAGNOSIS — C20 RECTAL CANCER (HCC): ICD-10-CM

## 2017-01-31 DIAGNOSIS — L98.9 SKIN LESION: ICD-10-CM

## 2017-01-31 DIAGNOSIS — C18.9 COLON CANCER (HCC): ICD-10-CM

## 2017-01-31 DIAGNOSIS — R94.4 DECREASED GFR: ICD-10-CM

## 2017-01-31 LAB
ALBUMIN SERPL BCP-MCNC: 4.2 G/DL (ref 3.2–4.9)
ALBUMIN/GLOB SERPL: 1.7 G/DL
ALP SERPL-CCNC: 114 U/L (ref 30–99)
ALT SERPL-CCNC: 21 U/L (ref 2–50)
ANION GAP SERPL CALC-SCNC: 10 MMOL/L (ref 0–11.9)
AST SERPL-CCNC: 24 U/L (ref 12–45)
BASOPHILS # BLD AUTO: 1.4 % (ref 0–1.8)
BASOPHILS # BLD: 0.05 K/UL (ref 0–0.12)
BILIRUB SERPL-MCNC: 0.8 MG/DL (ref 0.1–1.5)
BUN SERPL-MCNC: 27 MG/DL (ref 8–22)
CALCIUM SERPL-MCNC: 10.1 MG/DL (ref 8.5–10.5)
CEA SERPL-MCNC: 6.1 NG/ML (ref 0–3)
CHLORIDE SERPL-SCNC: 106 MMOL/L (ref 96–112)
CO2 SERPL-SCNC: 21 MMOL/L (ref 20–33)
CREAT SERPL-MCNC: 1.34 MG/DL (ref 0.5–1.4)
EOSINOPHIL # BLD AUTO: 0.11 K/UL (ref 0–0.51)
EOSINOPHIL NFR BLD: 3 % (ref 0–6.9)
ERYTHROCYTE [DISTWIDTH] IN BLOOD BY AUTOMATED COUNT: 59.4 FL (ref 35.9–50)
GFR SERPL CREATININE-BSD FRML MDRD: 38 ML/MIN/1.73 M 2
GLOBULIN SER CALC-MCNC: 2.5 G/DL (ref 1.9–3.5)
GLUCOSE SERPL-MCNC: 133 MG/DL (ref 65–99)
HCT VFR BLD AUTO: 40 % (ref 37–47)
HGB BLD-MCNC: 13.5 G/DL (ref 12–16)
LYMPHOCYTES # BLD AUTO: 1.37 K/UL (ref 1–4.8)
LYMPHOCYTES NFR BLD: 37.7 % (ref 22–41)
MCH RBC QN AUTO: 31.5 PG (ref 27–33)
MCHC RBC AUTO-ENTMCNC: 33.8 G/DL (ref 33.6–35)
MCV RBC AUTO: 93.2 FL (ref 81.4–97.8)
MONOCYTES # BLD AUTO: 0.74 K/UL (ref 0–0.85)
MONOCYTES NFR BLD AUTO: 20.4 % (ref 0–13.4)
NEUTROPHILS # BLD AUTO: 1.36 K/UL (ref 2–7.15)
NEUTROPHILS NFR BLD: 37.5 % (ref 44–72)
PLATELET # BLD AUTO: 169 K/UL (ref 164–446)
PMV BLD AUTO: 9.5 FL (ref 9–12.9)
POTASSIUM SERPL-SCNC: 3.7 MMOL/L (ref 3.6–5.5)
PROT SERPL-MCNC: 6.7 G/DL (ref 6–8.2)
RBC # BLD AUTO: 4.29 M/UL (ref 4.2–5.4)
SODIUM SERPL-SCNC: 137 MMOL/L (ref 135–145)
WBC # BLD AUTO: 3.6 K/UL (ref 4.8–10.8)

## 2017-01-31 PROCEDURE — 85025 COMPLETE CBC W/AUTO DIFF WBC: CPT

## 2017-01-31 PROCEDURE — 99214 OFFICE O/P EST MOD 30 MIN: CPT | Performed by: INTERNAL MEDICINE

## 2017-01-31 PROCEDURE — 96375 TX/PRO/DX INJ NEW DRUG ADDON: CPT

## 2017-01-31 PROCEDURE — 80053 COMPREHEN METABOLIC PANEL: CPT

## 2017-01-31 PROCEDURE — 4040F PNEUMOC VAC/ADMIN/RCVD: CPT | Performed by: INTERNAL MEDICINE

## 2017-01-31 PROCEDURE — 1101F PT FALLS ASSESS-DOCD LE1/YR: CPT | Performed by: INTERNAL MEDICINE

## 2017-01-31 PROCEDURE — G8484 FLU IMMUNIZE NO ADMIN: HCPCS | Performed by: INTERNAL MEDICINE

## 2017-01-31 PROCEDURE — 96367 TX/PROPH/DG ADDL SEQ IV INF: CPT

## 2017-01-31 PROCEDURE — 1036F TOBACCO NON-USER: CPT | Performed by: INTERNAL MEDICINE

## 2017-01-31 PROCEDURE — G8432 DEP SCR NOT DOC, RNG: HCPCS | Performed by: INTERNAL MEDICINE

## 2017-01-31 PROCEDURE — 306637 HCHG MISC ORTHO ITEM RC 0274

## 2017-01-31 PROCEDURE — 96409 CHEMO IV PUSH SNGL DRUG: CPT

## 2017-01-31 PROCEDURE — G8420 CALC BMI NORM PARAMETERS: HCPCS | Performed by: INTERNAL MEDICINE

## 2017-01-31 PROCEDURE — G0498 CHEMO EXTEND IV INFUS W/PUMP: HCPCS

## 2017-01-31 PROCEDURE — 700111 HCHG RX REV CODE 636 W/ 250 OVERRIDE (IP): Performed by: INTERNAL MEDICINE

## 2017-01-31 PROCEDURE — 82378 CARCINOEMBRYONIC ANTIGEN: CPT

## 2017-01-31 PROCEDURE — 700111 HCHG RX REV CODE 636 W/ 250 OVERRIDE (IP)

## 2017-01-31 PROCEDURE — A4212 NON CORING NEEDLE OR STYLET: HCPCS

## 2017-01-31 RX ORDER — DEXAMETHASONE SODIUM PHOSPHATE 4 MG/ML
8 INJECTION, SOLUTION INTRA-ARTICULAR; INTRALESIONAL; INTRAMUSCULAR; INTRAVENOUS; SOFT TISSUE ONCE
Status: COMPLETED | OUTPATIENT
Start: 2017-01-31 | End: 2017-01-31

## 2017-01-31 RX ADMIN — HEPARIN 500 UNITS: 100 SYRINGE at 09:15

## 2017-01-31 RX ADMIN — FLUOROURACIL 3610 MG: 50 INJECTION, SOLUTION INTRAVENOUS at 13:41

## 2017-01-31 RX ADMIN — LEUCOVORIN CALCIUM 752 MG: 350 INJECTION, POWDER, LYOPHILIZED, FOR SOLUTION INTRAMUSCULAR; INTRAVENOUS at 11:44

## 2017-01-31 RX ADMIN — FLUOROURACIL 600 MG: 50 INJECTION, SOLUTION INTRAVENOUS at 13:32

## 2017-01-31 RX ADMIN — DEXAMETHASONE SODIUM PHOSPHATE 8 MG: 4 INJECTION, SOLUTION INTRAMUSCULAR; INTRAVENOUS at 11:08

## 2017-01-31 ASSESSMENT — PAIN SCALES - GENERAL
PAINLEVEL: NO PAIN

## 2017-01-31 NOTE — PROGRESS NOTES
"Pharmacy Chemotherapy Verification:    Patient Name: Karlene Walters   Dx: Colon Cancer    Protocol: 5 FU/Leucovorin     Leucovorin 400 mg/m2 IV over 2 hours on day 1 followed by  Fluorouracil 400 mg/m2 IV puse on day 1 followed by   Dose reduced by 20%(320 mg/m2) starting Cycle 18 ([place] #11) 7/5/16 due to neutropenia   Fluorouracil 1200 mg/m2 IV continuous infusion daily on days 1 and 2 (2400 mg/m2 IV over 46-48 hours)    Infusion Dose reduced by 20% starting Cycle 18 ([place] #11)  7/5/16 due to neutropenia    14 day cycles for 12 cycles (adjuvant) or until disease progression or unacceptable toxicity  NCCN Guidelines for Colon Cancer.V.2.2015  Jay T, et al. Eur J Cancer.1999;35(9):1343-7    Allergies:  Codeine; Pcn; Sulfa drugs; and Tape     Ht 1.651 m (5' 5\")  Wt 76.7 kg (169 lb 1.5 oz)  BMI 28.14 kg/m2 Body surface area is 1.88 meters squared.     Labs 1/31/17:  ANC~ 1360 Plt = 169k   Hgb = 13.5   SCr = 1.34mg/dL CrCl ~ 43 mL/min  AST/ALT/AP/TBili = 24/21/114/0.8    K+ = 3.7  **OK to treat ANC 1360 on 1/31/17 per Dr. Cotter    Drug Order   (Drug name, dose, route, IV Fluid & volume, frequency, number of doses) Cycle: 31 ([place] C24; Cycles 1-7 prior to [place])  Previous treatment: 1/17/17   Medication = Leucovorin  Base Dose= 400 mg/m2  Calc Dose: Base Dose x 1.88 m2 = 752 mg  Final Dose = 752 mg  Route = IV  Fluid & Volume = D5W 250 mL  Admin Duration = Over 2 hours          <5% difference, OK to treat with final dose   Medication = Fluorouracil  Base Dose= 320 mg/m2   Calc Dose: Base Dose x 1.88 m2 = 602 mg  Final Dose = 600 mg  Route = IV push  Conc & Volume = 50 mg/mL = 12 mL  Admin Duration = Over 5 minutes       <5% difference, OK to treat with final dose   Medication = Fluorouracil  Base Dose= 1920 mg/m2  Calc Dose:Base Dose x 1.88 m2 = 3610 mg  Final Dose = 3610 mg  Route = IV Home infusion  Fluid & Volume 50 mg/ml;  72.2 mL undiluted drug  Admin Duration = Over 46 hours at 1.6 ml/hr   In CADD " cassette for home CIVI       <5% difference, OK to treat with final dose     By my signature below, I confirm this process was performed independently with the BSA and all final chemotherapy dosing calculations congruent. I have reviewed the above chemotherapy order and that my calculation of the final dose and BSA (when applicable) corroborate those calculations of the  pharmacist. Discrepancies of 5% or greater in the written dose have been addressed and documented within the EPIC Progress notes.    Sofiya Pedro Pharm.D.

## 2017-01-31 NOTE — MR AVS SNAPSHOT
"        Karlene Gilbert Romeo   2017 9:40 AM   Office Visit   MRN: [id number]    Department:  [facility]   Dept Phone:  [phone]    Description:  Female : 1940   Provider:  Radha Cotter M.D.           Reason for Visit     Follow-Up           Allergies as of 2017     Allergen Noted Reactions    Codeine 2011       \"gets drunk\"    Oxaliplatin 10/27/2015   Anaphylaxis    Pcn [Penicillins] 2011   Itching    Sulfa Drugs 2011   Itching    Tape 2013   Rash    PAPER TAPE OK      Vital Signs     Blood Pressure Pulse Temperature Respirations Height Weight    176/88 mmHg 86 36.6 °C (97.9 °F) 16 1.651 m (5' 5\") 76.5 kg (168 lb 10.4 oz)    Body Mass Index Oxygen Saturation Smoking Status             28.07 kg/m2 96% Never Smoker          Basic Information     Date Of Birth Sex Race Ethnicity Preferred Language    1940 Female White Non- English      Your appointments     2017 10:30 AM   Est Chemo 3 with RN 6   Infusion Services ([facility])    [address] [address]   [phone]            2017 11:00 AM   EST Port Flush / Central Line Care with INFUSION QUICK INJECT   Infusion Services ([facility])    [address] [address]   [phone]            2017  9:30 AM   Est Chemo 3 with RN 4   Infusion Services ([facility])    [address] [address]   [phone]            2017 10:30 AM   EST Port Flush / Central Line Care with INFUSION QUICK INJECT   Infusion Services ([facility])    [address] [address]   [phone]            2017  9:00 AM   Lab Draw with INFUSION QUICK INJECT   Infusion Services ([facility])    [address] [address]   [phone]            2017  9:40 AM   ONCOLOGY EST PATIENT 30 MIN with Radha Cotter M.D.   [facility] (--)    [address] [place] [address], [address]  [facility] [address]   "   [phone]            Feb 28, 2017 10:30 AM   Est Chemo 3 with RN 4   Infusion Services ([facility])    [address] [facility] [address]  [place] [address]   [phone]            Mar 02, 2017 11:30 AM   EST Port Flush / Central Line Care with INFUSION QUICK INJECT   Infusion Services ([facility])    [address]  [place] [address]   [phone]            Mar 14, 2017  9:30 AM   Est Chemo 3 with RN 6   Infusion Services ([facility])    [address] [facility] [address]  [place] [address]   [phone]            Mar 16, 2017 10:30 AM   EST Port Flush / Central Line Care with INFUSION QUICK INJECT   Infusion Services ([facility])    [address]  [place] [address]   [phone]            Mar 28, 2017  8:30 AM   Lab Draw with INFUSION QUICK INJECT   Infusion Services ([facility])    [address] [address]  [place] [address]   [phone]            Mar 28, 2017  9:00 AM   ONCOLOGY EST PATIENT 30 MIN with Radha Cotter M.D.   [facility] (--)    [address], [address]  [place] [address]   [phone]            Mar 28, 2017 10:00 AM   Est Chemo 3 with RN 2   Infusion Services ([facility])    [address]  [place] [address]   [phone]            Mar 30, 2017 11:00 AM   EST Port Flush / Central Line Care with INFUSION QUICK INJECT   Infusion Services ([facility])    [address]  [place] [address]   [phone]            Apr 14, 2017  9:00 AM   ANNUAL EXAM PREVENTATIVE with Manan Quiñonez M.D.   [facility] [address]    [address]  [place] [address]   [phone]            Jul 19, 2017 10:30 AM   FOLLOW UP with Ahsan Haque M.D.   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   7/17/2013 - Present    Abnormal EKG R94.31 High  8/12/2013 - Present    Hemorrhage of rectum and anus K62.5   10/23/2013 - Present    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   10/15/2014 - Present    Thyroid nodule E04.1   12/19/2014 - Present    Secondary malignant neoplasm of liver (CMS-HCC) C78.7   7/6/2015 - Present    Dehydration E86.0   8/14/2015 - Present    Nausea & vomiting R11.2 Medium  8/15/2015 - Present    Colon cancer (CMS-HCC) C18.9   8/16/2015 - Present    Pulmonary embolism (CMS-[facility]) I26.99 High  8/18/2015 - Present    Ileitis K52.9   8/24/2015 - Present    Diarrhea R19.7   8/24/2015 - Present    Hyperlipidemia E78.5   9/17/2015 - Present    Lightheadedness R42   9/17/2015 - Present    Shortness of breath R06.02 High  10/5/2015 - Present    Routine general medical examination at a health care facility Z00.00   3/14/2016 - Present    Renal insufficiency N28.9   3/14/2016 - Present    Hyperglycemia R73.9   3/14/2016 - Present    Decreased GFR R94.4   8/16/2016 - Present    Chemotherapy-induced neutropenia (CMS-[facility]) D70.1   9/28/2016 - Present      Health Maintenance        Date Due Completion Dates    IMM DTaP/Tdap/Td Vaccine (1 - Tdap) 4/13/1959 ---    BONE DENSITY 4/13/2005 ---    IMM PNEUMOCOCCAL 65+ (ADULT) LOW/MEDIUM RISK SERIES (2 of 2 - PCV13) 9/5/2007 9/5/2006    IMM INFLUENZA (1) 9/1/2016 10/18/2014, 9/5/2013, 10/12/2012    COLONOSCOPY 8/23/2025 8/23/2015, 9/20/2012            Current Immunizations     Influenza TIV (IM) 9/5/2013  6:12 AM, 10/12/2012  6:09 PM    Influenza Vaccine Quad Inj (Preserved) 10/18/2014    Pneumococcal polysaccharide vaccine (PPSV-23) 9/5/2006    SHINGLES VACCINE 1/14/2015      Below  and/or attached are the medications your provider expects you to take. Review all of your home medications and newly ordered medications with your provider and/or pharmacist. Follow medication instructions as directed by your provider and/or pharmacist. Please keep your medication list with you and share with your provider. Update the information when medications are discontinued, doses are changed, or new medications (including over-the-counter products) are added; and carry medication information at all times in the event of emergency situations     Allergies:  CODEINE - (reactions not documented)     OXALIPLATIN - Anaphylaxis     PCN - Itching     SULFA DRUGS - Itching     TAPE - Rash               Medications  Valid as of: January 31, 2017 - 10:11 AM    Generic Name Brand Name Tablet Size Instructions for use    Cholecalciferol (Cap) Vitamin D3 400 UNITS Take 1 Cap by mouth every day.        Cyanocobalamin (Tab) VITAMIN B-12 500 MCG Take 500 mcg by mouth every day.        Famotidine (Tab) PEPCID 40 MG Take 1 Tab by mouth every day.        Ibuprofen (Tab) MOTRIN 200 MG Take 200 mg by mouth every 6 hours as needed.        Lidocaine-Prilocaine (Cream) EMLA 2.5-2.5 % APPLY A THICK FILM OVER THE PORT ACCESS SITE PRIOR TO ACCESS        Loperamide HCl (Liquid) IMODIUM 1 MG/5ML Take 2 mg by mouth 4 times a day as needed for Diarrhea.        Loratadine (Tab) CLARITIN 10 MG Take 10 mg by mouth every day. Indications: Hayfever        Multiple Vitamins-Minerals   Take  by mouth.        Ondansetron HCl (Tab) ZOFRAN 4 MG         OxyCODONE HCl (Tab) ROXICODONE 5 MG Take 5 mg by mouth every four hours as needed for Severe Pain.        RaNITidine HCl (Tab) ZANTAC 150 MG Take 1 Tab by mouth 2 Times a Day.        Rivaroxaban (Tab) XARELTO 20 MG Take 1 Tab by mouth with dinner.        .                 Medicines prescribed today were sent to:     [facility] - [place], [place] - [address]    [address]  [place] [address]    Phone: [phone] Fax: [phone]    Open 24 Hours?: No      Medication refill instructions:       If your prescription bottle indicates you have medication refills left, it is not necessary to call your provider’s office. Please contact your pharmacy and they will refill your medication.    If your prescription bottle indicates you do not have any refills left, you may request refills at any time through one of the following ways: The online Referron system (except Urgent Care), by calling your provider’s office, or by asking your pharmacy to contact your provider’s office with a refill request. Medication refills are processed only during regular business hours and may not be available until the next business day. Your provider may request additional information or to have a follow-up visit with you prior to refilling your medication.   *Please Note: Medication refills are assigned a new Rx number when refilled electronically. Your pharmacy may indicate that no refills were authorized even though a new prescription for the same medication is available at the pharmacy. Please request the medicine by name with the pharmacy before contacting your provider for a refill.           Ref[employer]on Access Code: [email or web address]  Expires: 2/12/2017 10:51 AM    Referron  A secure, online tool to manage your health information     [employer]’s Referron® is a secure, online tool that connects you to your personalized health information from the privacy of your home -- day or night - making it very easy for you to manage your healthcare. Once the activation process is completed, you can even access your medical information using the Referron bart, which is available for free in the Apple Bart store or Google Play store.     Referron provides the following levels of access (as shown below):   My Chart Features   [facility] Doctor [facility]  Specialists [facility]  Urgent  Care Non-[facility]  Primary  Care  Doctor   Email your healthcare team securely and privately 24/7 X X X    Manage appointments: schedule your next appointment; view details of past/upcoming appointments X      Request prescription refills. X      View recent personal medical records, including lab and immunizations X X X X   View health record, including health history, allergies, medications X X X X   Read reports about your outpatient visits, procedures, consult and ER notes X X X X   See your discharge summary, which is a recap of your hospital and/or ER visit that includes your diagnosis, lab results, and care plan. X X       How to register for [employer]:  1. Go to  [email or web address].  2. Click on the Sign Up Now box, which takes you to the New Member Sign Up page. You will need to provide the following information:  a. Enter your [employer] Access Code exactly as it appears at the top of this page. (You will not need to use this code after you’ve completed the sign-up process. If you do not sign up before the expiration date, you must request a new code.)   b. Enter your date of birth.   c. Enter your home email address.   d. Click Submit, and follow the next screen’s instructions.  3. Create a [employer] ID. This will be your [employer] login ID and cannot be changed, so think of one that is secure and easy to remember.  4. Create a [employer] password. You can change your password at any time.  5. Enter your Password Reset Question and Answer. This can be used at a later time if you forget your password.   6. Enter your e-mail address. This allows you to receive e-mail notifications when new information is available in [employer].  7. Click Sign Up. You can now view your health information.    For assistance activating your [employer] account, call [phone]

## 2017-01-31 NOTE — PROGRESS NOTES
Pt to infusion services ambulatory per self.  Pt here for scheduled labs prior to f/u appointment with MD and chemotherapy scheduled for later this morning.  Plan of care reviewed. Pt verbalizes understanding.  Pt with left chest port with EMLA cream in place.  Port site cleansed and port accessed in sterile fashion.  Port flushes easily; + blood return verified.  Labs drawn per MD orders.  Port flushed with normal saline and heparin per policy.  Port remains accessed for treatment later this morning.  Pt released to self care and care of spouse in no apparent distress after completion of treatment, ambulatory.  Pt to f/u with MD at this time.  Will return for 10:30 appointment today.

## 2017-01-31 NOTE — PROGRESS NOTES
Patient presents ambulatory for C24D1 of chemotherapy.  Port was accessed earlier this am, flushed, and blood return noted.  Labs from this morning's appt were reviewed, ANC 1360.  Phone call to MD, order received to proceed with treatment.  Pre medication given per order and chemotherapy infused with no complications or adverse reactions.  CADD pump connected and patient to return on 2/2/17 for disconnect, confirmed with patient.  Patient left ambulatory with spouse in no distress.

## 2017-01-31 NOTE — PROGRESS NOTES
"Pharmacy Chemotherapy [facility]    Patient Name: Karlene Walters      Dx: Colon CA     Cycle:  31 (cycles 1-7 prior to [place])   Previous treatment: C30 on 1/17/17     Protocol: 5 FU/Leucovorin    Leucovorin 400 mg/m² IV over 2 hours on Day 1, followed by  Fluorouracil 400 mg/m²  IVP on Day 1, followed by   - Dose reduced by 20% to 320 mg/m² starting with Cycle 18 ([place] #11) on 7/5/16 due to neutropenia    Fluorouracil 1200 mg/m²  IV continuous infusion daily on Days 1 and 2 (2400 mg/m² IV over 46-48 hours) -   - Infusion Dose reduced by 20% starting with Cycle 18 ([place] #11) on 7/5/16 due to neutropenia   -20% reduction (1920 mg/m2) to be continued starting C28 per C Alsop APRN   14 day cycles for 12 cycles (adjuvant) or until disease progression or unacceptable toxicity  NCCN Guidelines for Colon Cancer. V.2.2015.  Jay T, et al. Eur J Cancer.1999;35(9):1343-7.          Allergies: Codeine; Oxaliplatin; Pcn; Sulfa drugs; and Tape  Ht 1.651 m (5' 5\")  Wt 76.7 kg (169 lb 1.5 oz)  BMI 28.14 kg/m2 Body surface area is 1.88 meters squared.     ANC~ 1360 (OK to treat on 1/31/17 per Cyndee LUCERO)  Plt = 169k   Hgb = 13.5  SCr = 1.34 mg/dL CrCl ~43 mL/min (OK if > 30 ml/min)  AST/ALT/AP/Tbili = WNL except AP = 114      Leucovorin 400 mg/m² x 1.88m² = 752mg   <5% difference, OK to treat with final dose = 752 mg IV    Fluorouracil (5-FU) 320 mg/m² x 1.88 m² = 602 mg   <5% difference, OK to treat with final dose = 600 mg IVP    Fluorouracil (5-FU) 1920 mg/m2 x 1.88 m2 = 3610 mg   <5% difference, OK to treat with final dose = 3610 mg CIVI   over 46 hours per home infusion pump.     Norris Mitchell, PharmD    "

## 2017-01-31 NOTE — PROGRESS NOTES
Follow Up Note: Oncology     Date: 1/31/17  Time: 9:40 am     Primary care physician: Dr. Quiñonez  Surgery: Dr. Ulises Kwok Oncologist: Dr. Orosco  Plastic surgery: Dr. Camacho  Gastroenterology: [facility]  Surgery: Dr. West   Cardiology: Dr. Haque    Diagnosis: Metastatic Invasive moderately differenated rectal cancer    Chief complaint: Patient is here for a follow-up visit and pre-chemotherapy visit    History of presenting illness:  Ms. Walters is a 76-year-old female with metastatic rectal cancer.  She was diagnosed in 2012 with rectal carcinoma secondary to bleeding. She was found to have a mass biopsy which was positive. She underwent laparoscopic colon resection with primary anastomosis. Pathology showed well-differentiated adenocarcinoma, 0/30 nodes and was staged pT2 N0 with all four mismatch appeared genes expressed.  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 8/2015 she was found to have a 2.4 cm mass in the liver and small pulmonary nodules as well as thyroid nodules. She underwent ablative therapy to the liver and was initially started on Xelox.  She was unable to tolerate oxaliplatin and was continued on single agent Xeloda. Xeloda as she had increased diarrhea since her regimen was changed to 5-FU. She has been on single agent 5-FU and has been tolerating. Her CEAs have been fairly low except recently they’ve been trending up slightly. Repeat CT scan in 12/20 showed any evidence of progression with stable findings.      Interval History:  She is here for follow up visit and is accompanied by her  who was present in the room. He has been fairly stable since her last visit. She has noticed intermittent swelling of her legs on. She was seen by cardiology and was recommended Lasix as needed. She took one dose of Lasix and was found to have improvement. She has noticed somewhat elevated blood  pressures with some fluctuation. She otherwise has been having stable bowel movements with diarrhea about once a week. He has a good appetite with stable weight. Her energy has been fairly stable with periodic bad days. She has noticed improvement in her right hip pain. Her memory has also been stable. She denies any nausea, vomiting and abdominal pain. She denies any fevers, chills or night sweats.      Past Medical History:  1. Metastatic rectal cancer   2. Rectal cancer 2012  3. PE  4. Fibroids and ovarian cysts   5. HTN   6. HLP      Allergies:  Codeine; Oxaliplatin; Pcn; Sulfa drugs; and Tape      Medications:  Current Outpatient Prescriptions on File Prior to Visit   Medication Sig Dispense Refill   • rivaroxaban (XARELTO) 20 MG Tab tablet Take 1 Tab by mouth with dinner. 30 Tab 6   • ranitidine (ZANTAC) 150 MG Tab Take 1 Tab by mouth 2 Times a Day. 60 Tab 3   • famotidine (PEPCID) 40 MG Tab Take 1 Tab by mouth every day. 90 Tab 2   • cyanocobalamin (VITAMIN B-12) 500 MCG Tab Take 500 mcg by mouth every day.     • Multiple Vitamins-Minerals (CENTRUM SILVER PO) Take  by mouth.     • Cholecalciferol (VITAMIN D3) 400 UNITS CAPS Take 1 Cap by mouth every day.     • loratadine (CLARITIN) 10 MG TABS Take 10 mg by mouth every day. Indications: Hayfever     • lidocaine-prilocaine (EMLA) 2.5-2.5 % Cream APPLY A THICK FILM OVER THE PORT ACCESS SITE PRIOR TO ACCESS 30 g 2   • loperamide (IMODIUM) 1 MG/5ML Liquid Take 2 mg by mouth 4 times a day as needed for Diarrhea.     • ondansetron (ZOFRAN) 4 MG Tab tablet      • ibuprofen (MOTRIN) 200 MG Tab Take 200 mg by mouth every 6 hours as needed.     • oxycodone immediate-release (ROXICODONE) 5 MG Tab Take 5 mg by mouth every four hours as needed for Severe Pain.       No current facility-administered medications on file prior to visit.       Review of Systems:  All other review of systems are negative except what was mentioned above in the HPI.  Constitutional: Negative for  "fever and chills. Positive for mild malaise/fatigue stable.   HENT: Negative for ear pain and nosebleeds  Eyes: Negative for blurred vision.   Respiratory: Negative for cough and sputum production.   Positive for shortness of breath on exertion.   Cardiovascular: Negative for chest pain and  palpitations.   Gastrointestinal: Negative for nausea, vomiting and abdominal pain.   Genitourinary: Negative for dysuria.    Musculoskeletal: Negative for myalgias and back pain.  positive for right hip pain stable  Skin: Negative for rash.   Neurological: negative for dizziness, negative for focal weakness and headaches.   Endo/Heme/Allergies: No bruise/bleed easily.   Psychiatric/Behavioral: Negative for depression.  Positive for mild memory changes which have been stable.     Physical Exam:  Vitals:  Filed Vitals:    17 0944   BP: 176/88   Pulse: 86   Temp: 36.6 °C (97.9 °F)   Resp: 16   Height: 1.651 m (5' 5\")   Weight: 76.5 kg (168 lb 10.4 oz)   SpO2: 96%       General: Not in acute distress, alert and oriented x 3  HEENT: normalcephalic, atraumatic, pupils equally reactive to light bilaterally, extra ocular muscles intact, moist oral oral mucus membranes, and oral cavity without any lesions.  Neck: Supple neck and full range of motion  Lymph nodes: No palpable bilateral cervical and supraclavicular lymphadenopathy.   CVS:Regular rate and rythem  RESP: Clear to auscultate bilaterally.   ABD: Soft, non tender, non distended, and positive bowel sounds.    EXT: No edema   CNS: Alert and oriented x3, cranial nerves grossly intact, muscle strength grossly intact, normal gait.     Labs:  1. CEA:  a. 1/3/17: 3.3  b. 16: 2.2  c. 16: 1.4  d. 10/25/13: 1.3  e. 16: 1  f. 16: 1.1  g. 16: 1.2  h. 3/29/16: 2  i. 3/1/16: 1.5  j. 16: 1.5   k. 12/22/15: 2.3  l. 10/26/15: 2.9      Imagin. 16 CT CAP: Stable CT scan findings.  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  No significant interval change in hepatic lesion status post ablation.  Diffuse low-attenuation liver consistent with fatty infiltration.  Right mid abdomen ileostomy without bowel dilatation.        Assessment and Plan:    1. Metastatic rectal cancer, KRAS positive:  She has been on single agent 5-FU and tolerating it well. Her CEA levels have been slowly trending up recently. Most recent CTs done in 12/2016 showed no evidence of progression with stable findings. We will continue to monitor CEA levels. She has persistent elevation then we’ll consider a PET scan. For now she is continued on single agent 5-FU.  2. History of pulmonary embolus: He was diagnosed in 8/2015. She was initially on Coumadin and was unchanged to xarelto. She completed 6 months of anticoagulation. She was continued on anticoagulation secondary to underlying malignancy. She is on xarelto.  3. Memory changes: prior imaging was negative for intracranial metastatic disease. Clinically stable  4. Sinus tachycardia: She continues to have asymptomatic sinus tachycardia. She is followed by cardiology. Recently she was found to have some edema and was recommended Lasix periodically. She is advised to take potassium along with Lasix. She was also noticed slight elevation in her blood pressures. She is due to follow-up with cardiology for further discussion about management of her hypertension.  5. Anal repair and reconstruction: Further reconstruction has been deferred by Dr. Montgomery.  6. Skin lesion: She has noticed a new lesion in the bridge of her nose. This has been persistent. I will refer her to dermatology.  7. She is to follow-up again in 4 weeks or sooner as needed.    She agreed and verbalized her agreement and understanding with the current plan. I answered all questions and concerns she has at this time and advised her to call at any time in  the interim with questions or concerns in regards to her care. Thank you for allowing me to participate in her care, I will continue to follow.    Please note that this dictation was created using voice recognition software. I have made every reasonable attempt to correct obvious errors, but I expect that there are errors of grammar and possibly content that I did not discover before finalizing the note.

## 2017-01-31 NOTE — PROGRESS NOTES
Chemotherapy Verification - SECONDARY RN       Height = 165.1 cm  Weight = 76.7 kg  BSA = 1.875 m2       Medication: 5FU  Dose: 320 mfg/m2 dr  Calculated Dose: 600.16 mg                             (In mg/m2, AUC, mg/kg)     Medication: Home [id number]  Dose: 1920 mg/m2 dr  Calculated Dose: 3600 mg                             (In mg/m2, AUC, mg/kg)      I confirm that this process was performed independently.

## 2017-01-31 NOTE — PROGRESS NOTES
Chemotherapy Verification - PRIMARY RN      Height = 65 in  Weight = 76.7 kg  BSA = 1.87 m2       Medication: 5FU  Dose: 1920 mg/m2  Calculated Dose: 3593.6 mg over 46 hours                             (In mg/m2, AUC, mg/kg)     Medication: 5FU  Dose: 320 mg/m2  Calculated Dose: 598.9 mg                             (In mg/m2, AUC, mg/kg)        I confirm this process was performed independently with the BSA and all final chemotherapy dosing calculations congruent.  Any discrepancies of 5% or greater have been addressed with the chemotherapy pharmacist. The resolution of the discrepancy has been documented in the EPIC progress notes.

## 2017-02-01 ENCOUNTER — TELEPHONE (OUTPATIENT)
Dept: CARDIOLOGY | Facility: MEDICAL CENTER | Age: 77
End: 2017-02-01

## 2017-02-01 DIAGNOSIS — R60.9 EDEMA, UNSPECIFIED TYPE: ICD-10-CM

## 2017-02-01 DIAGNOSIS — R06.02 SHORTNESS OF BREATH: ICD-10-CM

## 2017-02-01 RX ORDER — POTASSIUM CHLORIDE 750 MG/1
10 TABLET, FILM COATED, EXTENDED RELEASE ORAL DAILY
Qty: 30 TAB | Refills: 3 | OUTPATIENT
Start: 2017-02-01 | End: 2018-04-21 | Stop reason: SDUPTHER

## 2017-02-01 RX ORDER — FUROSEMIDE 20 MG/1
20 TABLET ORAL DAILY
Qty: 30 TAB | Refills: 3 | OUTPATIENT
Start: 2017-02-01 | End: 2017-10-04

## 2017-02-01 NOTE — TELEPHONE ENCOUNTER
Spoke with patient who brought to office BP readings from 1/26-1/31. (Scanned in MED[employer] ) She is in town getting he 5FU chemo through pump; Dr. Cotter.   Readings are somewhat high in AM, better in PM.  She did try her husbands furosemide 40 mg on about 1/7, reduced edema in legs considerable.  Took 20 mg about 1/14.  She will not take any more until Dr. Haque addresses her BP and need for potassium if on diuretic as suggested by Dr. Cotter.  Information to Dr. Haque

## 2017-02-01 NOTE — TELEPHONE ENCOUNTER
BPs mostly look good.  She can take 20 mg lasix PRN for swelling.  She should also take 10 mEq KCL.

## 2017-02-02 ENCOUNTER — OUTPATIENT INFUSION SERVICES (OUTPATIENT)
Dept: ONCOLOGY | Facility: MEDICAL CENTER | Age: 77
End: 2017-02-02
Attending: INTERNAL MEDICINE
Payer: MEDICARE

## 2017-02-02 VITALS
TEMPERATURE: 97.5 F | WEIGHT: 171.3 LBS | OXYGEN SATURATION: 100 % | RESPIRATION RATE: 18 BRPM | BODY MASS INDEX: 28.54 KG/M2 | HEIGHT: 65 IN | DIASTOLIC BLOOD PRESSURE: 63 MMHG | HEART RATE: 83 BPM | SYSTOLIC BLOOD PRESSURE: 117 MMHG

## 2017-02-02 DIAGNOSIS — R94.4 DECREASED GFR: ICD-10-CM

## 2017-02-02 DIAGNOSIS — C20 RECTAL CANCER (HCC): ICD-10-CM

## 2017-02-02 PROCEDURE — 96523 IRRIG DRUG DELIVERY DEVICE: CPT

## 2017-02-02 PROCEDURE — 700111 HCHG RX REV CODE 636 W/ 250 OVERRIDE (IP)

## 2017-02-02 RX ADMIN — HEPARIN 500 UNITS: 100 SYRINGE at 13:46

## 2017-02-02 ASSESSMENT — PAIN SCALES - GENERAL: PAINLEVEL: NO PAIN

## 2017-02-02 NOTE — PROGRESS NOTES
Pt presents ambulatory for pump de-access. Pt reports that pump stopped at 1140, RN confirmed that pump ended and volume was empty. Port flushed with saline and heparin per protocol, de-accessed and gauze with drsg applied to site. Pump returned to pharmacy, pt wishes to keep bag as she is going to work on fixing the strap for improved comfort. Pt to return on 2/14/17, appt confirmed with pt. Pt left ambulatory in no distress.

## 2017-02-02 NOTE — TELEPHONE ENCOUNTER
Pt notified BP readings overall non concerning.  Advised of Rx for edema PRN.  Called to [facility] in [place].  She will  when she goes home.

## 2017-02-14 ENCOUNTER — HOSPITAL ENCOUNTER (OUTPATIENT)
Dept: RADIOLOGY | Facility: MEDICAL CENTER | Age: 77
End: 2017-02-14
Attending: NURSE PRACTITIONER
Payer: MEDICARE

## 2017-02-14 ENCOUNTER — OUTPATIENT INFUSION SERVICES (OUTPATIENT)
Dept: ONCOLOGY | Facility: MEDICAL CENTER | Age: 77
End: 2017-02-14
Attending: INTERNAL MEDICINE
Payer: MEDICARE

## 2017-02-14 ENCOUNTER — OFFICE VISIT (OUTPATIENT)
Dept: HEMATOLOGY ONCOLOGY | Facility: MEDICAL CENTER | Age: 77
End: 2017-02-14
Payer: MEDICARE

## 2017-02-14 VITALS
WEIGHT: 169.56 LBS | TEMPERATURE: 97.7 F | DIASTOLIC BLOOD PRESSURE: 70 MMHG | RESPIRATION RATE: 12 BRPM | OXYGEN SATURATION: 97 % | HEART RATE: 109 BPM | BODY MASS INDEX: 28.22 KG/M2 | SYSTOLIC BLOOD PRESSURE: 140 MMHG

## 2017-02-14 DIAGNOSIS — C78.7 SECONDARY MALIGNANT NEOPLASM OF LIVER (HCC): ICD-10-CM

## 2017-02-14 DIAGNOSIS — M79.601 PAIN OF RIGHT UPPER EXTREMITY: ICD-10-CM

## 2017-02-14 DIAGNOSIS — I82.621 DEEP VEIN THROMBOSIS (DVT) OF RIGHT UPPER EXTREMITY, UNSPECIFIED CHRONICITY, UNSPECIFIED VEIN (HCC): ICD-10-CM

## 2017-02-14 PROCEDURE — 99213 OFFICE O/P EST LOW 20 MIN: CPT | Performed by: NURSE PRACTITIONER

## 2017-02-14 PROCEDURE — G8432 DEP SCR NOT DOC, RNG: HCPCS | Performed by: NURSE PRACTITIONER

## 2017-02-14 PROCEDURE — 4040F PNEUMOC VAC/ADMIN/RCVD: CPT | Performed by: NURSE PRACTITIONER

## 2017-02-14 PROCEDURE — G8484 FLU IMMUNIZE NO ADMIN: HCPCS | Performed by: NURSE PRACTITIONER

## 2017-02-14 PROCEDURE — 1101F PT FALLS ASSESS-DOCD LE1/YR: CPT | Performed by: NURSE PRACTITIONER

## 2017-02-14 PROCEDURE — 93971 EXTREMITY STUDY: CPT | Mod: 26 | Performed by: SURGERY

## 2017-02-14 PROCEDURE — G8420 CALC BMI NORM PARAMETERS: HCPCS | Performed by: NURSE PRACTITIONER

## 2017-02-14 PROCEDURE — 1036F TOBACCO NON-USER: CPT | Performed by: NURSE PRACTITIONER

## 2017-02-14 ASSESSMENT — ENCOUNTER SYMPTOMS
NAUSEA: 0
FEVER: 0
COUGH: 0
CHILLS: 0
WEIGHT LOSS: 0
CONSTIPATION: 0
DIARRHEA: 0
VOMITING: 0

## 2017-02-14 ASSESSMENT — PAIN SCALES - GENERAL: PAINLEVEL: 8=MODERATE-SEVERE PAIN

## 2017-02-14 NOTE — PROGRESS NOTES
"Subjective:      Karlene Walters is a 76 y.o. female who presents with Follow-Up for pain in her right upper extremity.         HPI    Patient seen today in follow-up for complaint of right upper extremity pain. Patient has metastatic colorectal cancer and is currently on single agent 5-FU every 2 weeks. She is scheduled to receive chemotherapy today. Patient noticed a \"bump\" on the top part of her right breast that has gotten slightly bigger few weeks ago. She has noticed the pain in that area radiates down to her thumb and has gotten worse since Thursday last week, 5 days ago. She is now to the point that she cannot use her right hand to  things due to pain and weakness in that area. She does have slight swelling of the thumb as well. Patient with increased pain and tenderness upon palpation to this area. She states there has not been any episodes that would cause this pain over the last few weeks. Patient also with significant dryness and cracking and peeling of her hands due to chemotherapy. Patient stated her skin normally is much better that it is today when she is due for her chemotherapy. She does have a waxy appearance with some mild cracking at this time as well. Dr. Cotter and to assess the patient in the clinic today.    Allergies   Allergen Reactions   • Codeine      \"gets drunk\"   • Oxaliplatin Anaphylaxis   • Pcn [Penicillins] Itching   • Sulfa Drugs Itching   • Tape Rash     PAPER TAPE OK     Current Outpatient Prescriptions on File Prior to Visit   Medication Sig Dispense Refill   • furosemide (LASIX) 20 MG Tab Take 1 Tab by mouth every day. PRN EDEMA 30 Tab 3   • potassium chloride ER (KLOR-CON) 10 MEQ tablet Take 1 Tab by mouth every day. 30 Tab 3   • rivaroxaban (XARELTO) 20 MG Tab tablet Take 1 Tab by mouth with dinner. 30 Tab 6   • ranitidine (ZANTAC) 150 MG Tab Take 1 Tab by mouth 2 Times a Day. 60 Tab 3   • famotidine (PEPCID) 40 MG Tab Take 1 Tab by mouth every day. 90 Tab 2   • " lidocaine-prilocaine (EMLA) 2.5-2.5 % Cream APPLY A THICK FILM OVER THE PORT ACCESS SITE PRIOR TO ACCESS 30 g 2   • loperamide (IMODIUM) 1 MG/5ML Liquid Take 2 mg by mouth 4 times a day as needed for Diarrhea.     • ondansetron (ZOFRAN) 4 MG Tab tablet      • ibuprofen (MOTRIN) 200 MG Tab Take 200 mg by mouth every 6 hours as needed.     • oxycodone immediate-release (ROXICODONE) 5 MG Tab Take 5 mg by mouth every four hours as needed for Severe Pain.     • cyanocobalamin (VITAMIN B-12) 500 MCG Tab Take 500 mcg by mouth every day.     • Multiple Vitamins-Minerals (CENTRUM SILVER PO) Take  by mouth.     • Cholecalciferol (VITAMIN D3) 400 UNITS CAPS Take 1 Cap by mouth every day.     • loratadine (CLARITIN) 10 MG TABS Take 10 mg by mouth every day. Indications: Hayfever       No current facility-administered medications on file prior to visit.       Review of Systems   Constitutional: Negative for fever, chills, weight loss and malaise/fatigue.   Respiratory: Negative for cough.    Gastrointestinal: Negative for nausea, vomiting, diarrhea and constipation.   Genitourinary: Negative for dysuria.   Musculoskeletal: Positive for joint pain (right wrist).          Objective:     /70 mmHg  Pulse 109  Temp(Src) 36.5 °C (97.7 °F)  Resp 12  Wt 76.91 kg (169 lb 8.9 oz)  SpO2 97%     Physical Exam   Constitutional: She is oriented to person, place, and time. She appears well-developed and well-nourished. No distress.   Cardiovascular: Normal rate and normal heart sounds.    Slightly tachycardic   Pulmonary/Chest: Effort normal and breath sounds normal. No respiratory distress. She has no wheezes.   Neurological: She is alert and oriented to person, place, and time.   Skin: Skin is warm and dry. She is not diaphoretic. There is erythema.   Dryness, erythema and cracking of her hands with a semi-wax appearance.    Psychiatric: She has a normal mood and affect. Her behavior is normal.   Vitals reviewed.               Assessment/Plan:     1. Secondary malignant neoplasm of liver (CMS-HCC)     2. Pain of right upper extremity  DX-HAND 3+ RIGHT    US-EXTREMITY VENOUS UNILATERAL-UPPER RIGHT     Plan  1. Patient is scheduled to receive cycle 25 of single agent 5-FU today. Due to significant pain and swelling of her right upper extremity as well as significant erythema and waxy appearance to her skin of her hands with some mild cracking Dr. Cotter like to hold chemotherapy today and possibly reschedule for the end of this week.    2. New onset of swelling in the right wrist area with some additional swelling of the thumb with associated pain. Dr. Cotter and to assess patient as well today. Uncertain as to the cause of this at this time. We will proceed with an x-ray to ensure there is no bone involvement as well as an ultrasound of the area as this appears to be more of a soft tissue injury. Patient is in agreement with the plan.    3. Anticipate treating patient with him this week with 5-FU if skin improves. Patient to notify office on how she is doing towards the end of the week.    4. Patient to follow up in 2 weeks or sooner if needed.

## 2017-02-14 NOTE — MR AVS SNAPSHOT
"        Karlene Gilbert Romeo   2017 9:00 AM   Office Visit   MRN: [id number]    Department:  [facility]   Dept Phone:  [phone]    Description:  Female : 1940   Provider:  Ella De A.PAngelaN.           Reason for Visit     Follow-Up pain in rt wrist to thumb, swelling      Allergies as of 2017     Allergen Noted Reactions    Codeine 2011       \"gets drunk\"    Oxaliplatin 10/27/2015   Anaphylaxis    Pcn [Penicillins] 2011   Itching    Sulfa Drugs 2011   Itching    Tape 2013   Rash    PAPER TAPE OK      You were diagnosed with     Pain of right upper extremity   [[id number]]         Vital Signs     Blood Pressure Pulse Temperature Respirations Weight Oxygen Saturation    140/70 mmHg 109 36.5 °C (97.7 °F) 12 76.91 kg (169 lb 8.9 oz) 97%    Smoking Status                   Never Smoker            Basic Information     Date Of Birth Sex Race Ethnicity Preferred Language    1940 Female White Non- English      Your appointments     2017 12:15 PM   UPPER EXTREMITY VENOUS MAPPING with VASCULAR LAB [facility] EXAM 8   NON-INVASIVE LAB [facility] ([facility])    [address] [facility] [address]   [phone]           No prep            2017 11:30 AM   Est Chemo 3 with RN 2   Infusion Services ([facility])    [address] [address]   [phone]            2017  2:30 PM   EST Port Flush / Central Line Care with RN 2   Infusion Services ([facility])    [address] [address]   [phone]            2017  9:00 AM   Lab Draw with INFUSION QUICK INJECT   Infusion Services ([facility])    [address] [address]   [phone]            2017  9:40 AM   ONCOLOGY EST PATIENT 30 MIN with Radha Cotter M.D.   [facility] (--)    [address], [address]  [facility] [id number] [phone]            2017 10:30 AM   Est Chemo 3 with RN 4   Infusion " Services ([facility])    [address] [facility] [address]  [place] [address]   [phone]            Mar 02, 2017 11:30 AM   EST Port Flush / Central Line Care with INFUSION QUICK INJECT   Infusion Services ([facility])    [address] [facility] [address]  [place] [address]   [phone]            Mar 14, 2017  9:30 AM   Est Chemo 3 with RN 6   Infusion Services ([facility])    [address]  [facility] [address]   [phone]            Mar 16, 2017 10:30 AM   EST Port Flush / Central Line Care with INFUSION QUICK INJECT   Infusion Services ([facility])    [address]  [place] [address]   [phone]            Mar 28, 2017  8:30 AM   Lab Draw with INFUSION QUICK INJECT   Infusion Services ([facility])    [address]  [facility] [address]   [phone]            Mar 28, 2017  9:00 AM   ONCOLOGY EST PATIENT 30 MIN with Radha Cotter M.D.   [facility] (--)    [address], [address]  [facility] [address]   [phone]            Mar 28, 2017 10:00 AM   Est Chemo 3 with RN 2   Infusion Services ([facility])    [address]  [place] [address]   [phone]            Mar 30, 2017 11:00 AM   EST Port Flush / Central Line Care with INFUSION QUICK INJECT   Infusion Services ([facility])    [address]  [place] [address]   [phone]            Apr 11, 2017  8:00 AM   Est Chemo 3 with RN 1   Infusion Services ([facility])    [address]  [place] [address]   [phone]            Apr 13, 2017 10:30 AM   EST Port Flush / Central Line Care with RN 5   Infusion Services ([facility])    [address]  [place] [address]   [phone]            Apr 14, 2017  9:00 AM   ANNUAL EXAM PREVENTATIVE with Manan Quiñonez M.D.   [address]    [address] [place] [address]  [address]  [facility] [address]   [phone]            Apr 25, 2017  8:30 AM   Lab Draw with INFUSION QUICK INJECT   Infusion Services ([facility])    [address] [facility]  [address]  [place] [address]   [phone]            Apr 25, 2017  9:00 AM   ONCOLOGY EST PATIENT 30 MIN with DELORES Arteaga.   [facility] (--)    [address], [address]  [place] [address]   [phone]            Apr 25, 2017 10:00 AM   Est Chemo 3 with RN 2   Infusion Services ([facility])    [address] [facility] [address]  [place] [address]   [phone]            Apr 27, 2017 11:30 AM   EST Port Flush / Central Line Care with RN 2   Infusion Services ([facility])    [address] [facility] [address]  [place] [address]   [phone]            Jul 19, 2017 10:30 AM   FOLLOW UP with Ahsan Haque M.D.   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   7/17/2013 - Present    Abnormal EKG R94.31 High  8/12/2013 - Present    Hemorrhage of rectum and anus K62.5   10/23/2013 - Present    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   10/15/2014 - Present    Thyroid nodule E04.1   12/19/2014 - Present    Secondary malignant neoplasm of liver (CMS-HCC) C78.7   7/6/2015 - Present    Dehydration E86.0   8/14/2015 - Present    Nausea & vomiting R11.2 Medium  8/15/2015 - Present    Colon cancer (CMS-HCC) C18.9   8/16/2015 - Present    Pulmonary embolism (CMS-HCC) I26.99 High  8/18/2015 - Present    Ileitis K52.9   8/24/2015 - Present    Diarrhea R19.7   8/24/2015 - Present    Hyperlipidemia E78.5   9/17/2015 - Present    Lightheadedness R42   9/17/2015 - Present    Shortness of breath R06.02 High   10/5/2015 - Present    Routine general medical examination at a health care facility Z00.00   3/14/2016 - Present    Renal insufficiency N28.9   3/14/2016 - Present    Hyperglycemia R73.9   3/14/2016 - Present    Decreased GFR R94.4   8/16/2016 - Present    Chemotherapy-induced neutropenia (CMS-HCC) D70.1   9/28/2016 - Present      Health Maintenance        Date Due Completion Dates    IMM DTaP/Tdap/Td Vaccine (1 - Tdap) 4/13/1959 ---    BONE DENSITY 4/13/2005 ---    IMM PNEUMOCOCCAL 65+ (ADULT) LOW/MEDIUM RISK SERIES (2 of 2 - PCV13) 9/5/2007 9/5/2006    IMM INFLUENZA (1) 9/1/2016 10/18/2014, 9/5/2013, 10/12/2012    COLONOSCOPY 8/23/2025 8/23/2015, 9/20/2012            Current Immunizations     Influenza TIV (IM) 9/5/2013  6:12 AM, 10/12/2012  6:09 PM    Influenza Vaccine Quad Inj (Preserved) 10/18/2014    Pneumococcal polysaccharide vaccine (PPSV-23) 9/5/2006    SHINGLES VACCINE 1/14/2015      Below and/or attached are the medications your provider expects you to take. Review all of your home medications and newly ordered medications with your provider and/or pharmacist. Follow medication instructions as directed by your provider and/or pharmacist. Please keep your medication list with you and share with your provider. Update the information when medications are discontinued, doses are changed, or new medications (including over-the-counter products) are added; and carry medication information at all times in the event of emergency situations     Allergies:  CODEINE - (reactions not documented)     OXALIPLATIN - Anaphylaxis     PCN - Itching     SULFA DRUGS - Itching     TAPE - Rash               Medications  Valid as of: February 14, 2017 -  9:48 AM    Generic Name Brand Name Tablet Size Instructions for use    Cholecalciferol (Cap) Vitamin D3 400 UNITS Take 1 Cap by mouth every day.        Cyanocobalamin (Tab) VITAMIN B-12 500 MCG Take 500 mcg by mouth every day.        Famotidine (Tab) PEPCID 40 MG Take 1 Tab  by mouth every day.        Furosemide (Tab) LASIX 20 MG Take 1 Tab by mouth every day. PRN EDEMA        Ibuprofen (Tab) MOTRIN 200 MG Take 200 mg by mouth every 6 hours as needed.        Lidocaine-Prilocaine (Cream) EMLA 2.5-2.5 % APPLY A THICK FILM OVER THE PORT ACCESS SITE PRIOR TO ACCESS        Loperamide HCl (Liquid) IMODIUM 1 MG/5ML Take 2 mg by mouth 4 times a day as needed for Diarrhea.        Loratadine (Tab) CLARITIN 10 MG Take 10 mg by mouth every day. Indications: Hayfever        Multiple Vitamins-Minerals   Take  by mouth.        Ondansetron HCl (Tab) ZOFRAN 4 MG         OxyCODONE HCl (Tab) ROXICODONE 5 MG Take 5 mg by mouth every four hours as needed for Severe Pain.        Potassium Chloride (Tab CR) KLOR-CON 10 MEQ Take 1 Tab by mouth every day.        RaNITidine HCl (Tab) ZANTAC 150 MG Take 1 Tab by mouth 2 Times a Day.        Rivaroxaban (Tab) XARELTO 20 MG Take 1 Tab by mouth with dinner.        .                 Medicines prescribed today were sent to:     [facility] [place] [facility] [address] [facility]    [address] [facility] [place] [address]    Phone: [phone] Fax: [phone]    Open 24 Hours?: No      Medication refill instructions:       If your prescription bottle indicates you have medication refills left, it is not necessary to call your provider’s office. Please contact your pharmacy and they will refill your medication.    If your prescription bottle indicates you do not have any refills left, you may request refills at any time through one of the following ways: The online [employer] system (except Urgent Care), by calling your provider’s office, or by asking your pharmacy to contact your provider’s office with a refill request. Medication refills are processed only during regular business hours and may not be available until the next business day. Your provider may request additional information or to have a follow-up visit with you prior to refilling your  medication.   *Please Note: Medication refills are assigned a new Rx number when refilled electronically. Your pharmacy may indicate that no refills were authorized even though a new prescription for the same medication is available at the pharmacy. Please request the medicine by name with the pharmacy before contacting your provider for a refill.        Your To Do List     Future Labs/Procedures Complete By Expires    DX-HAND 3+ RIGHT  As directed 2/14/2018    US-EXTREMITY VENOUS UNILATERAL-UPPER RIGHT  As directed 2/14/2018         inmobly Access Code: [email or web address]  Expires: 3/13/2017  9:48 AM    inmobly  A secure, online tool to manage your health information     [employer]’s inmobly® is a secure, online tool that connects you to your personalized health information from the privacy of your home -- day or night - making it very easy for you to manage your healthcare. Once the activation process is completed, you can even access your medical information using the inmobly bart, which is available for free in the Apple Bart store or Google Play store.     inmobly provides the following levels of access (as shown below):   My Chart Features   [facility] Doctor [facility]  Specialists [facility]  Urgent  Care Non-[facility]  Primary Care  Doctor   Email your healthcare team securely and privately 24/7 X X X    Manage appointments: schedule your next appointment; view details of past/upcoming appointments X      Request prescription refills. X      View recent personal medical records, including lab and immunizations X X X X   View health record, including health history, allergies, medications X X X X   Read reports about your outpatient visits, procedures, consult and ER notes X X X X   See your discharge summary, which is a recap of your hospital and/or ER visit that includes your diagnosis, lab results, and care plan. X X       How to register for inmobly:  1. Go to  [email or web address].  2. Click on the  Sign Up Now box, which takes you to the New Member Sign Up page. You will need to provide the following information:  a. Enter your [employer] Access Code exactly as it appears at the top of this page. (You will not need to use this code after you’ve completed the sign-up process. If you do not sign up before the expiration date, you must request a new code.)   b. Enter your date of birth.   c. Enter your home email address.   d. Click Submit, and follow the next screen’s instructions.  3. Create a [employer] ID. This will be your [employer] login ID and cannot be changed, so think of one that is secure and easy to remember.  4. Create a [employer] password. You can change your password at any time.  5. Enter your Password Reset Question and Answer. This can be used at a later time if you forget your password.   6. Enter your e-mail address. This allows you to receive e-mail notifications when new information is available in [employer].  7. Click Sign Up. You can now view your health information.    For assistance activating your [employer] account, call [phone]

## 2017-02-15 ENCOUNTER — TELEPHONE (OUTPATIENT)
Dept: HEMATOLOGY ONCOLOGY | Facility: MEDICAL CENTER | Age: 77
End: 2017-02-15

## 2017-02-15 NOTE — TELEPHONE ENCOUNTER
After review with ANDREW De, called patient with results of imaging from yesterday.  Pt states she has noticed improvement from yesterday and she has been applying lotion to dry/cracked areas.  She is advised to ice areas of swelling for 10-15 min time periods and take breaks.  She agrees to visit clinic prior to chemo appointment this Friday, 2/17 for RN visit to assess status.

## 2017-02-15 NOTE — TELEPHONE ENCOUNTER
Patient called requesting the results of her ultrasound performed on 2/14/2017. I informed her one of our nurses or Ella MORALES will contact them as soon as they can. Patient agreed.

## 2017-02-16 ENCOUNTER — APPOINTMENT (OUTPATIENT)
Dept: ONCOLOGY | Facility: MEDICAL CENTER | Age: 77
End: 2017-02-16
Attending: INTERNAL MEDICINE
Payer: MEDICARE

## 2017-02-17 ENCOUNTER — OUTPATIENT INFUSION SERVICES (OUTPATIENT)
Dept: ONCOLOGY | Facility: MEDICAL CENTER | Age: 77
End: 2017-02-17
Attending: INTERNAL MEDICINE
Payer: MEDICARE

## 2017-02-17 VITALS
WEIGHT: 170.42 LBS | DIASTOLIC BLOOD PRESSURE: 78 MMHG | HEIGHT: 65 IN | TEMPERATURE: 97.2 F | RESPIRATION RATE: 18 BRPM | SYSTOLIC BLOOD PRESSURE: 130 MMHG | BODY MASS INDEX: 28.39 KG/M2 | HEART RATE: 103 BPM | OXYGEN SATURATION: 97 %

## 2017-02-17 DIAGNOSIS — C20 RECTAL CANCER (HCC): ICD-10-CM

## 2017-02-17 DIAGNOSIS — C18.9 MALIGNANT NEOPLASM OF COLON, UNSPECIFIED PART OF COLON (HCC): ICD-10-CM

## 2017-02-17 DIAGNOSIS — R94.4 DECREASED GFR: ICD-10-CM

## 2017-02-17 LAB
BASOPHILS # BLD AUTO: 1.4 % (ref 0–1.8)
BASOPHILS # BLD: 0.05 K/UL (ref 0–0.12)
EOSINOPHIL # BLD AUTO: 0.15 K/UL (ref 0–0.51)
EOSINOPHIL NFR BLD: 4.3 % (ref 0–6.9)
ERYTHROCYTE [DISTWIDTH] IN BLOOD BY AUTOMATED COUNT: 63.4 FL (ref 35.9–50)
HCT VFR BLD AUTO: 39.2 % (ref 37–47)
HGB BLD-MCNC: 12.8 G/DL (ref 12–16)
IMM GRANULOCYTES # BLD AUTO: 0.01 K/UL (ref 0–0.11)
IMM GRANULOCYTES NFR BLD AUTO: 0.3 % (ref 0–0.9)
LYMPHOCYTES # BLD AUTO: 1.32 K/UL (ref 1–4.8)
LYMPHOCYTES NFR BLD: 37.6 % (ref 22–41)
MCH RBC QN AUTO: 30.8 PG (ref 27–33)
MCHC RBC AUTO-ENTMCNC: 32.7 G/DL (ref 33.6–35)
MCV RBC AUTO: 94.2 FL (ref 81.4–97.8)
MONOCYTES # BLD AUTO: 0.54 K/UL (ref 0–0.85)
MONOCYTES NFR BLD AUTO: 15.4 % (ref 0–13.4)
NEUTROPHILS # BLD AUTO: 1.44 K/UL (ref 2–7.15)
NEUTROPHILS NFR BLD: 41 % (ref 44–72)
NRBC # BLD AUTO: 0 K/UL
NRBC BLD AUTO-RTO: 0 /100 WBC
PLATELET # BLD AUTO: 189 K/UL (ref 164–446)
PMV BLD AUTO: 10 FL (ref 9–12.9)
RBC # BLD AUTO: 4.16 M/UL (ref 4.2–5.4)
WBC # BLD AUTO: 3.5 K/UL (ref 4.8–10.8)

## 2017-02-17 PROCEDURE — 700111 HCHG RX REV CODE 636 W/ 250 OVERRIDE (IP): Performed by: NURSE PRACTITIONER

## 2017-02-17 PROCEDURE — A4212 NON CORING NEEDLE OR STYLET: HCPCS

## 2017-02-17 PROCEDURE — 96409 CHEMO IV PUSH SNGL DRUG: CPT

## 2017-02-17 PROCEDURE — 96367 TX/PROPH/DG ADDL SEQ IV INF: CPT

## 2017-02-17 PROCEDURE — 85025 COMPLETE CBC W/AUTO DIFF WBC: CPT

## 2017-02-17 PROCEDURE — 96375 TX/PRO/DX INJ NEW DRUG ADDON: CPT

## 2017-02-17 RX ORDER — DEXAMETHASONE SODIUM PHOSPHATE 4 MG/ML
8 INJECTION, SOLUTION INTRA-ARTICULAR; INTRALESIONAL; INTRAMUSCULAR; INTRAVENOUS; SOFT TISSUE ONCE
Status: COMPLETED | OUTPATIENT
Start: 2017-02-17 | End: 2017-02-17

## 2017-02-17 RX ADMIN — LEUCOVORIN CALCIUM 752 MG: 350 INJECTION, POWDER, LYOPHILIZED, FOR SOLUTION INTRAMUSCULAR; INTRAVENOUS at 13:07

## 2017-02-17 RX ADMIN — FLUOROURACIL 600 MG: 50 INJECTION, SOLUTION INTRAVENOUS at 14:03

## 2017-02-17 RX ADMIN — FLUOROURACIL 3610 MG: 50 INJECTION, SOLUTION INTRAVENOUS at 15:24

## 2017-02-17 RX ADMIN — DEXAMETHASONE SODIUM PHOSPHATE 8 MG: 4 INJECTION, SOLUTION INTRAMUSCULAR; INTRAVENOUS at 12:32

## 2017-02-17 ASSESSMENT — PAIN SCALES - GENERAL: PAINLEVEL: NO PAIN

## 2017-02-17 NOTE — PROGRESS NOTES
"Pharmacy Chemotherapy [facility]    Patient Name: Karlene Walters      Dx: Colon CA     Cycle:  32 ([facility] cycle 25)   Previous treatment: C31 on 1/31/17     Protocol: 5 FU/Leucovorin    Leucovorin 400 mg/m² IV over 2 hours on Day 1, followed by  Fluorouracil 400 mg/m²  IVP on Day 1, followed by   - Dose reduced by 20% to 320 mg/m² starting with Cycle 18 ([facility] #11) on 7/5/16 due to neutropenia    Fluorouracil 1200 mg/m²  IV continuous infusion daily on Days 1 and 2 (2400 mg/m² IV over 46-48 hours) -   - Infusion Dose reduced by 20% starting with Cycle 18 ([facility] #11) on 7/5/16 due to neutropenia   -20% reduction (1920 mg/m2) to be continued starting C28 per C Alsop APRN   14 day cycles for 12 cycles (adjuvant) or until disease progression or unacceptable toxicity  NCCN Guidelines for Colon Cancer. V.2.2015.  Jay T, et al. Eur J Cancer.1999;35(9):1343-7.          Allergies: Codeine; Oxaliplatin; Pcn; Sulfa drugs; and Tape  /78 mmHg  Pulse 103  Temp(Src) 36.2 °C (97.2 °F)  Resp 18  Ht 1.651 m (5' 5\")  Wt 77.3 kg (170 lb 6.7 oz)  BMI 28.36 kg/m2  SpO2 97% Body surface area is 1.88 meters squared.     Labs 2/17/17  ANC~ 1440 (OK to treat per C Alsop APN) Plt = 189k   Hgb = 12.8  Labs 1/31/17 (CMP even cycles only)  SCr = 1.34 mg/dL CrCl ~43 mL/min (OK if > 30 ml/min)  AST/ALT/AP/Tbili = WNL except AP = 114      Leucovorin 400 mg/m² x 1.88m² = 752mg   <5% difference, OK to treat with final dose = 752 mg IV    Fluorouracil (5-FU) 320 mg/m² x 1.88 m² = 602 mg   <5% difference, OK to treat with final dose = 600 mg IVP    Fluorouracil (5-FU) 1920 mg/m2 x 1.88 m2 = 3610 mg   <5% difference, OK to treat with final dose = 3610 mg CIVI   over 46 hours per home infusion pump.     Andre Paz, MigueD      "

## 2017-02-17 NOTE — PROGRESS NOTES
"Pharmacy Chemotherapy Verification:    Patient Name: Karlene Walters   Dx: Colon Cancer    Protocol: 5 FU/Leucovorin     Leucovorin 400 mg/m2 IV over 2 hours on day 1 followed by  Fluorouracil 400 mg/m2 IV puse on day 1 followed by    Dose reduced by 20%(320 mg/m2) starting Cycle 18 ([facility] #11) 7/5/16 due to neutropenia   Fluorouracil 1200 mg/m2 IV continuous infusion daily on days 1 and 2 (2400 mg/m2 IV over 46-48 hours)     Infusion dose reduced by 20% starting Cycle 18 ([facility] #11)  7/5/16 due to neutropenia    14 day cycles for 12 cycles (adjuvant) or until disease progression or unacceptable toxicity  NCCN Guidelines for Colon Cancer.V.2.2015  Jay T, et al. Eur J Cancer.1999;35(9):1343-7    Allergies:  Codeine; Pcn; Sulfa drugs; and Tape     /78 mmHg  Pulse 103  Temp(Src) 36.2 °C (97.2 °F)  Resp 18  Ht 1.651 m (5' 5\")  Wt 77.3 kg (170 lb 6.7 oz)  BMI 28.36 kg/m2  SpO2 97% Body surface area is 1.88 meters squared.     02/17/17 ANC~ 1440 (ok to treat per CARLOS Romo)   Plt = 189k   Hgb = 12.8     01/31/17  SCr = 1.34mg/dL CrCl ~ 43 mL/min  AST/ALT/AP/TBili = 24/21/114/0.8    K+ = 3.7      Drug Order   (Drug name, dose, route, IV Fluid & volume, frequency, number of doses) Cycle 32 ([facility] C25)   Previous treatment: 01/31/17   Medication = Leucovorin  Base Dose= 400 mg/m2  Calc Dose: Base Dose x 1.88 m2 = 752 mg  Final Dose = 752 mg  Route = IV  Fluid & Volume = D5W 250 mL  Admin Duration = Over 2 hours          <5% difference, OK to treat with final dose   Medication = Fluorouracil (5-FU)  Base Dose= 320 mg/m2   Calc Dose: Base Dose x 1.88 m2 = 602 mg  Final Dose = 600 mg  Route = IV push  Conc & Volume = 50 mg/mL = 12 mL  Admin Duration = Over 5 minutes       <5% difference, OK to treat with final dose   Medication = Fluorouracil (5-FU)  Base Dose= 1920 mg/m2  Calc Dose:Base Dose x 1.88 m2 = 3610 mg  Final Dose = 3610 mg  Route = CIVI  Fluid & Volume = 72.2 mL via CADD pump   Admin Duration = " Over 46 hours at 1.6 ml/hr   In CADD cassette for home CIVI       <5% difference, OK to treat with final dose     By my signature below, I confirm this process was performed independently with the BSA and all final chemotherapy dosing calculations congruent. I have reviewed the above chemotherapy order and that my calculation of the final dose and BSA (when applicable) corroborate those calculations of the  pharmacist. Discrepancies of 5% or greater in the written dose have been addressed and documented within the EPIC Progress notes.    Lalit Andrade, MigueD

## 2017-02-17 NOTE — PROGRESS NOTES
"Chemotherapy Verification - SECONDARY RN       Height = 65\"  Weight = 77.3 kg  BSA = 1.88 m2       Medication: 5FU bolus  Dose: 320 mg/m2  Calculated Dose: 601 mg                Medication: 5FU pump  Dose: 1920 mg/m2  Calculated Dose: 3609.6 mg                               I confirm that this process was performed independently.   "

## 2017-02-17 NOTE — PROGRESS NOTES
Chemotherapy Verification - PRIMARY RN      Height = 165.1 cm  Weight = 77.3 kg  BSA = 1.88 m^2       Medication: fluorouracil home infusion  Dose: 1920 mg/m^2  Calculated Dose: 3609.6 mg                             (In mg/m2, AUC, mg/kg)     Medication: fluorouracil bolus Dose: 320 mg/m^2  Calculated Dose: 601.6 mg                             (In mg/m2, AUC, mg/kg)    I confirm this process was performed independently with the BSA and all final chemotherapy dosing calculations congruent.  Any discrepancies of 5% or greater have been addressed with the chemotherapy pharmacist. The resolution of the discrepancy has been documented in the EPIC progress notes.

## 2017-02-18 NOTE — PROGRESS NOTES
Patient here for C25D1 Leucovorin/5 FU bolus/5 FU home infusion. Port accessed using sterile technique; labs drawn. ANC = 1440. Ella GONZALEZ notified; order received to proceed with treatment. Pre-medication given per MAR. Chemotherapy given per MAR. Patient connected to CADD pump. Next appointment scheduled. Discharged to self care; no apparent distress noted.

## 2017-02-19 ENCOUNTER — OUTPATIENT INFUSION SERVICES (OUTPATIENT)
Dept: ONCOLOGY | Facility: MEDICAL CENTER | Age: 77
End: 2017-02-19
Attending: INTERNAL MEDICINE
Payer: MEDICARE

## 2017-02-19 VITALS
BODY MASS INDEX: 28.76 KG/M2 | SYSTOLIC BLOOD PRESSURE: 133 MMHG | HEART RATE: 87 BPM | HEIGHT: 65 IN | TEMPERATURE: 97.7 F | DIASTOLIC BLOOD PRESSURE: 66 MMHG | OXYGEN SATURATION: 97 % | RESPIRATION RATE: 18 BRPM | WEIGHT: 172.62 LBS

## 2017-02-19 DIAGNOSIS — R94.4 DECREASED GFR: ICD-10-CM

## 2017-02-19 DIAGNOSIS — C20 RECTAL CANCER (HCC): ICD-10-CM

## 2017-02-19 PROCEDURE — 700111 HCHG RX REV CODE 636 W/ 250 OVERRIDE (IP)

## 2017-02-19 PROCEDURE — 96523 IRRIG DRUG DELIVERY DEVICE: CPT

## 2017-02-19 RX ADMIN — HEPARIN 500 UNITS: 100 SYRINGE at 14:36

## 2017-02-19 ASSESSMENT — PAIN SCALES - GENERAL: PAINLEVEL: NO PAIN

## 2017-02-19 NOTE — PROGRESS NOTES
Patient arrived to infusion center for De-access of 5FU and flush of her port. Patient had no complaints today. Port-a-cath flushed with saline and heparin per protocol. Port-a-cath de-accessed and covered with gauze and tape. Patient confirms appointment for 2/28/17. Discharged to self care.

## 2017-02-28 ENCOUNTER — OUTPATIENT INFUSION SERVICES (OUTPATIENT)
Dept: ONCOLOGY | Facility: MEDICAL CENTER | Age: 77
End: 2017-02-28
Attending: INTERNAL MEDICINE
Payer: MEDICARE

## 2017-02-28 ENCOUNTER — OFFICE VISIT (OUTPATIENT)
Dept: HEMATOLOGY ONCOLOGY | Facility: MEDICAL CENTER | Age: 77
End: 2017-02-28
Payer: MEDICARE

## 2017-02-28 VITALS
WEIGHT: 171.74 LBS | TEMPERATURE: 97.3 F | SYSTOLIC BLOOD PRESSURE: 154 MMHG | HEART RATE: 90 BPM | OXYGEN SATURATION: 97 % | HEIGHT: 65 IN | BODY MASS INDEX: 28.61 KG/M2 | DIASTOLIC BLOOD PRESSURE: 80 MMHG | RESPIRATION RATE: 18 BRPM

## 2017-02-28 VITALS
OXYGEN SATURATION: 97 % | DIASTOLIC BLOOD PRESSURE: 80 MMHG | HEART RATE: 90 BPM | TEMPERATURE: 97.4 F | WEIGHT: 171.74 LBS | SYSTOLIC BLOOD PRESSURE: 154 MMHG | BODY MASS INDEX: 28.61 KG/M2 | RESPIRATION RATE: 18 BRPM | HEIGHT: 65 IN

## 2017-02-28 VITALS
TEMPERATURE: 97.9 F | WEIGHT: 171.7 LBS | DIASTOLIC BLOOD PRESSURE: 88 MMHG | HEIGHT: 65 IN | HEART RATE: 82 BPM | OXYGEN SATURATION: 98 % | RESPIRATION RATE: 16 BRPM | BODY MASS INDEX: 28.61 KG/M2 | SYSTOLIC BLOOD PRESSURE: 162 MMHG

## 2017-02-28 DIAGNOSIS — C18.9 METASTATIC COLON CANCER TO LIVER (HCC): ICD-10-CM

## 2017-02-28 DIAGNOSIS — T45.1X5A CHEMOTHERAPY-INDUCED NEUTROPENIA (HCC): ICD-10-CM

## 2017-02-28 DIAGNOSIS — R94.4 DECREASED GFR: ICD-10-CM

## 2017-02-28 DIAGNOSIS — C20 RECTAL CANCER (HCC): ICD-10-CM

## 2017-02-28 DIAGNOSIS — C78.7 METASTATIC COLON CANCER TO LIVER (HCC): ICD-10-CM

## 2017-02-28 DIAGNOSIS — D70.1 CHEMOTHERAPY-INDUCED NEUTROPENIA (HCC): ICD-10-CM

## 2017-02-28 DIAGNOSIS — C18.9 MALIGNANT NEOPLASM OF COLON, UNSPECIFIED PART OF COLON (HCC): ICD-10-CM

## 2017-02-28 LAB
ALBUMIN SERPL BCP-MCNC: 3.8 G/DL (ref 3.2–4.9)
ALBUMIN/GLOB SERPL: 1.7 G/DL
ALP SERPL-CCNC: 99 U/L (ref 30–99)
ALT SERPL-CCNC: 15 U/L (ref 2–50)
ANION GAP SERPL CALC-SCNC: 8 MMOL/L (ref 0–11.9)
AST SERPL-CCNC: 20 U/L (ref 12–45)
BASOPHILS # BLD AUTO: 1 % (ref 0–1.8)
BASOPHILS # BLD: 0.03 K/UL (ref 0–0.12)
BILIRUB SERPL-MCNC: 0.7 MG/DL (ref 0.1–1.5)
BUN SERPL-MCNC: 22 MG/DL (ref 8–22)
CALCIUM SERPL-MCNC: 9.6 MG/DL (ref 8.5–10.5)
CEA SERPL-MCNC: 5 NG/ML (ref 0–3)
CHLORIDE SERPL-SCNC: 109 MMOL/L (ref 96–112)
CO2 SERPL-SCNC: 22 MMOL/L (ref 20–33)
CREAT SERPL-MCNC: 1.12 MG/DL (ref 0.5–1.4)
EOSINOPHIL # BLD AUTO: 0.09 K/UL (ref 0–0.51)
EOSINOPHIL NFR BLD: 3.1 % (ref 0–6.9)
ERYTHROCYTE [DISTWIDTH] IN BLOOD BY AUTOMATED COUNT: 62.4 FL (ref 35.9–50)
GFR SERPL CREATININE-BSD FRML MDRD: 47 ML/MIN/1.73 M 2
GLOBULIN SER CALC-MCNC: 2.2 G/DL (ref 1.9–3.5)
GLUCOSE SERPL-MCNC: 110 MG/DL (ref 65–99)
HCT VFR BLD AUTO: 37.4 % (ref 37–47)
HGB BLD-MCNC: 12.2 G/DL (ref 12–16)
IMM GRANULOCYTES # BLD AUTO: 0.01 K/UL (ref 0–0.11)
IMM GRANULOCYTES NFR BLD AUTO: 0.3 % (ref 0–0.9)
LYMPHOCYTES # BLD AUTO: 1.06 K/UL (ref 1–4.8)
LYMPHOCYTES NFR BLD: 37.1 % (ref 22–41)
MCH RBC QN AUTO: 31 PG (ref 27–33)
MCHC RBC AUTO-ENTMCNC: 32.6 G/DL (ref 33.6–35)
MCV RBC AUTO: 94.9 FL (ref 81.4–97.8)
MONOCYTES # BLD AUTO: 0.37 K/UL (ref 0–0.85)
MONOCYTES NFR BLD AUTO: 12.9 % (ref 0–13.4)
NEUTROPHILS # BLD AUTO: 1.3 K/UL (ref 2–7.15)
NEUTROPHILS NFR BLD: 45.6 % (ref 44–72)
NRBC # BLD AUTO: 0 K/UL
NRBC BLD AUTO-RTO: 0 /100 WBC
PLATELET # BLD AUTO: 178 K/UL (ref 164–446)
PMV BLD AUTO: 9.7 FL (ref 9–12.9)
POTASSIUM SERPL-SCNC: 3.7 MMOL/L (ref 3.6–5.5)
PROT SERPL-MCNC: 6 G/DL (ref 6–8.2)
RBC # BLD AUTO: 3.94 M/UL (ref 4.2–5.4)
SODIUM SERPL-SCNC: 139 MMOL/L (ref 135–145)
WBC # BLD AUTO: 2.9 K/UL (ref 4.8–10.8)

## 2017-02-28 PROCEDURE — 36591 DRAW BLOOD OFF VENOUS DEVICE: CPT

## 2017-02-28 PROCEDURE — 85025 COMPLETE CBC W/AUTO DIFF WBC: CPT

## 2017-02-28 PROCEDURE — 80053 COMPREHEN METABOLIC PANEL: CPT

## 2017-02-28 PROCEDURE — G8432 DEP SCR NOT DOC, RNG: HCPCS | Performed by: INTERNAL MEDICINE

## 2017-02-28 PROCEDURE — G8484 FLU IMMUNIZE NO ADMIN: HCPCS | Performed by: INTERNAL MEDICINE

## 2017-02-28 PROCEDURE — 96409 CHEMO IV PUSH SNGL DRUG: CPT

## 2017-02-28 PROCEDURE — 700111 HCHG RX REV CODE 636 W/ 250 OVERRIDE (IP)

## 2017-02-28 PROCEDURE — 96366 THER/PROPH/DIAG IV INF ADDON: CPT

## 2017-02-28 PROCEDURE — 4040F PNEUMOC VAC/ADMIN/RCVD: CPT | Performed by: INTERNAL MEDICINE

## 2017-02-28 PROCEDURE — 96367 TX/PROPH/DG ADDL SEQ IV INF: CPT

## 2017-02-28 PROCEDURE — 700111 HCHG RX REV CODE 636 W/ 250 OVERRIDE (IP): Performed by: INTERNAL MEDICINE

## 2017-02-28 PROCEDURE — 99214 OFFICE O/P EST MOD 30 MIN: CPT | Performed by: INTERNAL MEDICINE

## 2017-02-28 PROCEDURE — A4212 NON CORING NEEDLE OR STYLET: HCPCS

## 2017-02-28 PROCEDURE — 96375 TX/PRO/DX INJ NEW DRUG ADDON: CPT

## 2017-02-28 PROCEDURE — 1101F PT FALLS ASSESS-DOCD LE1/YR: CPT | Performed by: INTERNAL MEDICINE

## 2017-02-28 PROCEDURE — G0498 CHEMO EXTEND IV INFUS W/PUMP: HCPCS

## 2017-02-28 PROCEDURE — G8420 CALC BMI NORM PARAMETERS: HCPCS | Performed by: INTERNAL MEDICINE

## 2017-02-28 PROCEDURE — 1036F TOBACCO NON-USER: CPT | Performed by: INTERNAL MEDICINE

## 2017-02-28 PROCEDURE — 82378 CARCINOEMBRYONIC ANTIGEN: CPT

## 2017-02-28 RX ORDER — DEXAMETHASONE SODIUM PHOSPHATE 4 MG/ML
8 INJECTION, SOLUTION INTRA-ARTICULAR; INTRALESIONAL; INTRAMUSCULAR; INTRAVENOUS; SOFT TISSUE ONCE
Status: COMPLETED | OUTPATIENT
Start: 2017-02-28 | End: 2017-02-28

## 2017-02-28 RX ADMIN — FLUOROURACIL 605 MG: 50 INJECTION, SOLUTION INTRAVENOUS at 13:36

## 2017-02-28 RX ADMIN — FLUOROURACIL 3630 MG: 50 INJECTION, SOLUTION INTRAVENOUS at 13:37

## 2017-02-28 RX ADMIN — HEPARIN: 100 SYRINGE at 09:15

## 2017-02-28 RX ADMIN — LEUCOVORIN CALCIUM 756 MG: 350 INJECTION, POWDER, LYOPHILIZED, FOR SOLUTION INTRAMUSCULAR; INTRAVENOUS at 11:33

## 2017-02-28 RX ADMIN — DEXAMETHASONE SODIUM PHOSPHATE 8 MG: 4 INJECTION, SOLUTION INTRAMUSCULAR; INTRAVENOUS at 10:58

## 2017-02-28 ASSESSMENT — PATIENT HEALTH QUESTIONNAIRE - PHQ9: CLINICAL INTERPRETATION OF PHQ2 SCORE: 0

## 2017-02-28 ASSESSMENT — PAIN SCALES - GENERAL
PAINLEVEL: NO PAIN
PAINLEVEL: NO PAIN
PAINLEVEL: 2=MINIMAL-SLIGHT

## 2017-02-28 NOTE — PROGRESS NOTES
Chemotherapy Verification - SECONDARY RN       Height = 165.1 cm  Weight = 77.9 kg  BSA = 1.89 m2       Medication: Fluorouracil  Dose: 320 mg/m2  Calculated Dose: 604.8 mg                             (In mg/m2, AUC, mg/kg)     Medication: Fluorouracil  Dose: 1920 mg/m2  Calculated Dose: 3628.8 mg (CIVI over 46 hours)                            (In mg/m2, AUC, mg/kg)      I confirm that this process was performed independently.

## 2017-02-28 NOTE — MR AVS SNAPSHOT
"        Karlene Gilbert Romeo   2017 9:40 AM   Office Visit   MRN: [id number]    Department:  [facility]   Dept Phone:  [phone]    Description:  Female : 1940   Provider:  Radha Cotter M.D.           Reason for Visit     Follow-Up           Allergies as of 2017     Allergen Noted Reactions    Codeine 2011       \"gets drunk\"    Oxaliplatin 10/27/2015   Anaphylaxis    Pcn [Penicillins] 2011   Itching    Sulfa Drugs 2011   Itching    Tape 2013   Rash    PAPER TAPE OK      Vital Signs     Blood Pressure Pulse Temperature Respirations Height Weight    162/88 mmHg 82 36.6 °C (97.9 °F) 16 1.651 m (5' 5\") 77.883 kg (171 lb 11.2 oz)    Body Mass Index Oxygen Saturation Smoking Status             28.57 kg/m2 98% Never Smoker          Basic Information     Date Of Birth Sex Race Ethnicity Preferred Language    1940 Female White Non- English      Your appointments     2017 10:30 AM   Est Chemo 3 with RN 5   Infusion Services ([facility])    [address] [address]   [phone]            Mar 02, 2017 11:30 AM   EST Port Flush / Central Line Care with INFUSION QUICK INJECT   Infusion Services ([facility])    [address] [address]   [phone]            Mar 14, 2017  9:30 AM   Est Chemo 3 with RN 6   Infusion Services ([facility])    [address] [address]   [phone]            Mar 16, 2017 10:30 AM   EST Port Flush / Central Line Care with INFUSION QUICK INJECT   Infusion Services ([facility])    [address] [address]   [phone]            Mar 28, 2017  8:30 AM   Lab Draw with INFUSION QUICK INJECT   Infusion Services ([facility])    [address] [address]   [phone]            Mar 28, 2017  9:00 AM   ONCOLOGY EST PATIENT 30 MIN with Radha Cotter M.D.   [facility] (--)    [address] [place] [address], [address]  [facility] [address]   "   [phone]            Mar 28, 2017 10:00 AM   Est Chemo 3 with RN 2   Infusion Services ([facility])    [address] [facility] [address]  [place] [address]   [phone]            Mar 30, 2017 11:00 AM   EST Port Flush / Central Line Care with INFUSION QUICK INJECT   Infusion Services ([facility])    [address] [facility] [address]  [place] [address]   [phone]            Apr 11, 2017  8:00 AM   Est Chemo 3 with RN 1   Infusion Services ([facility])    [address] [facility] [address]  [place] [address]   [phone]            Apr 13, 2017 10:30 AM   EST Port Flush / Central Line Care with RN 5   Infusion Services ([facility])    [address]  [place] [address]   [phone]            Apr 14, 2017  9:00 AM   ANNUAL EXAM PREVENTATIVE with Manan Quiñonez M.D.   [facility] [address]    [address]  [place] [address]   [phone]            Apr 25, 2017  8:30 AM   Lab Draw with INFUSION QUICK INJECT   Infusion Services ([facility])    [address]  [place] [address]   [phone]            Apr 25, 2017  9:00 AM   ONCOLOGY EST PATIENT 30 MIN with ARTURO Arteaga   [facility] (--)    [address], [address]  [place] [address]   [phone]            Apr 25, 2017 10:00 AM   Est Chemo 3 with RN 2   Infusion Services ([facility])    [address] [facility] [address]  [place] [address]   [phone]            Apr 27, 2017 11:30 AM   EST Port Flush / Central Line Care with RN 2   Infusion Services ([facility])    [address]  [place] [address]   [phone]            Jul 19, 2017 10:30 AM   FOLLOW UP with Ahsan Haque M.D.   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   7/17/2013 - Present    Abnormal EKG R94.31 High  8/12/2013 - Present    Hemorrhage of rectum and anus K62.5   10/23/2013 - Present    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   10/15/2014 - Present    Thyroid nodule E04.1   12/19/2014 - Present    Secondary malignant neoplasm of liver (CMS-HCC) C78.7   7/6/2015 - Present    Dehydration E86.0   8/14/2015 - Present    Nausea & vomiting R11.2 Medium  8/15/2015 - Present    Colon cancer (CMS-HCC) C18.9   8/16/2015 - Present    Pulmonary embolism (CMS-HCC) I26.99 High  8/18/2015 - Present    Ileitis K52.9   8/24/2015 - Present    Diarrhea R19.7   8/24/2015 - Present    Hyperlipidemia E78.5   9/17/2015 - Present    Lightheadedness R42   9/17/2015 - Present    Shortness of breath R06.02 High  10/5/2015 - Present    Routine general medical examination at a health care facility Z00.00   3/14/2016 - Present    Renal insufficiency N28.9   3/14/2016 - Present    Hyperglycemia R73.9   3/14/2016 - Present    Decreased GFR R94.4   8/16/2016 - Present    Chemotherapy-induced neutropenia (CMS-HCC) D70.1   9/28/2016 - Present      Health Maintenance        Date Due Completion Dates    IMM DTaP/Tdap/Td Vaccine (1 - Tdap) 4/13/1959 ---    BONE DENSITY 4/13/2005 ---    IMM PNEUMOCOCCAL 65+ (ADULT) LOW/MEDIUM RISK SERIES (2 of 2 - PCV13) 9/5/2007 9/5/2006    IMM INFLUENZA (1) 9/1/2016 10/18/2014, 9/5/2013, 10/12/2012    COLONOSCOPY 8/23/2025 8/23/2015, 9/20/2012            Current Immunizations     Influenza TIV (IM) 9/5/2013  6:12 AM, 10/12/2012  6:09 PM    Influenza Vaccine Quad Inj (Preserved) 10/18/2014    Pneumococcal polysaccharide vaccine (PPSV-23) 9/5/2006    SHINGLES VACCINE 1/14/2015      Below and/or attached are the  medications your provider expects you to take. Review all of your home medications and newly ordered medications with your provider and/or pharmacist. Follow medication instructions as directed by your provider and/or pharmacist. Please keep your medication list with you and share with your provider. Update the information when medications are discontinued, doses are changed, or new medications (including over-the-counter products) are added; and carry medication information at all times in the event of emergency situations     Allergies:  CODEINE - (reactions not documented)     OXALIPLATIN - Anaphylaxis     PCN - Itching     SULFA DRUGS - Itching     TAPE - Rash               Medications  Valid as of: February 28, 2017 -  9:54 AM    Generic Name Brand Name Tablet Size Instructions for use    Cholecalciferol (Cap) Vitamin D3 400 UNITS Take 1 Cap by mouth every day.        Cyanocobalamin (Tab) VITAMIN B-12 500 MCG Take 500 mcg by mouth every day.        Famotidine (Tab) PEPCID 40 MG Take 1 Tab by mouth every day.        Furosemide (Tab) LASIX 20 MG Take 1 Tab by mouth every day. PRN EDEMA        Ibuprofen (Tab) MOTRIN 200 MG Take 200 mg by mouth every 6 hours as needed.        Lidocaine-Prilocaine (Cream) EMLA 2.5-2.5 % APPLY A THICK FILM OVER THE PORT ACCESS SITE PRIOR TO ACCESS        Loperamide HCl (Liquid) IMODIUM 1 MG/5ML Take 2 mg by mouth 4 times a day as needed for Diarrhea.        Loratadine (Tab) CLARITIN 10 MG Take 10 mg by mouth every day. Indications: Hayfever        Multiple Vitamins-Minerals   Take  by mouth.        Ondansetron HCl (Tab) ZOFRAN 4 MG         OxyCODONE HCl (Tab) ROXICODONE 5 MG Take 5 mg by mouth every four hours as needed for Severe Pain.        Potassium Chloride (Tab CR) KLOR-CON 10 MEQ Take 1 Tab by mouth every day.        RaNITidine HCl (Tab) ZANTAC 150 MG Take 1 Tab by mouth 2 Times a Day.        Rivaroxaban (Tab) XARELTO 20 MG Take 1 Tab by mouth with dinner.        .                   Medicines prescribed today were sent to:     [facility] [place] [place] - [address]    [address] [facility] [place] [address]    Phone: [phone] Fax: [phone]    Open 24 Hours?: No      Medication refill instructions:       If your prescription bottle indicates you have medication refills left, it is not necessary to call your provider’s office. Please contact your pharmacy and they will refill your medication.    If your prescription bottle indicates you do not have any refills left, you may request refills at any time through one of the following ways: The online Funzio system (except Urgent Care), by calling your provider’s office, or by asking your pharmacy to contact your provider’s office with a refill request. Medication refills are processed only during regular business hours and may not be available until the next business day. Your provider may request additional information or to have a follow-up visit with you prior to refilling your medication.   *Please Note: Medication refills are assigned a new Rx number when refilled electronically. Your pharmacy may indicate that no refills were authorized even though a new prescription for the same medication is available at the pharmacy. Please request the medicine by name with the pharmacy before contacting your provider for a refill.           Funzio Access Code: [email or web address]  Expires: 3/13/2017  9:48 AM    Funzio  A secure, online tool to manage your health information     [employer] Funzio® is a secure, online tool that connects you to your personalized health information from the privacy of your home -- day or night - making it very easy for you to manage your healthcare. Once the activation process is completed, you can even access your medical information using the Funzio bart, which is available for free in the Apple Bart store or Google Play store.     Funzio provides the following levels of  access (as shown below):   My Chart Features   [facility] Primary [facility] Doctor [facility]  Specialists [facility]  Urgent  Care Non-[facility]  Primary Care  Doctor   Email your healthcare team securely and privately 24/7 X X X    Manage appointments: schedule your next appointment; view details of past/upcoming appointments X      Request prescription refills. X      View recent personal medical records, including lab and immunizations X X X X   View health record, including health history, allergies, medications X X X X   Read reports about your outpatient visits, procedures, consult and ER notes X X X X   See your discharge summary, which is a recap of your hospital and/or ER visit that includes your diagnosis, lab results, and care plan. X X       How to register for m2fx:  1. Go to  [email or web address].  2. Click on the Sign Up Now box, which takes you to the New Member Sign Up page. You will need to provide the following information:  a. Enter your m2fx Access Code exactly as it appears at the top of this page. (You will not need to use this code after you’ve completed the sign-up process. If you do not sign up before the expiration date, you must request a new code.)   b. Enter your date of birth.   c. Enter your home email address.   d. Click Submit, and follow the next screen’s instructions.  3. Create a m2fx ID. This will be your m2fx login ID and cannot be changed, so think of one that is secure and easy to remember.  4. Create a m2fx password. You can change your password at any time.  5. Enter your Password Reset Question and Answer. This can be used at a later time if you forget your password.   6. Enter your e-mail address. This allows you to receive e-mail notifications when new information is available in m2fx.  7. Click Sign Up. You can now view your health information.    For assistance activating your m2fx account, call [phone]

## 2017-02-28 NOTE — PROGRESS NOTES
Chemotherapy Verification - PRIMARY RN      Height = 165.1cm  Weight = 77.9kg  BSA = 1.89m2       Medication: Fluorouracil CADD pump  Dose: 1920mg/m2  Calculated Dose:  3628.8mg CADD pump                            (In mg/m2, AUC, mg/kg)     Medication: Adrucil Push  Dose: 320mg/m2  Calculated Dose:  605mg                             (In mg/m2, AUC, mg/kg)    I confirm this process was performed independently with the BSA and all final chemotherapy dosing calculations congruent.  Any discrepancies of 5% or greater have been addressed with the chemotherapy pharmacist. The resolution of the discrepancy has been documented in the EPIC progress notes.

## 2017-02-28 NOTE — PROGRESS NOTES
"Pharmacy Chemotherapy Verification:    Patient Name: Karlene Walters   Dx: Colon Cancer    Protocol: 5 FU/Leucovorin     Leucovorin 400 mg/m2 IV over 2 hours on day 1 followed by  Fluorouracil 400 mg/m2 IV puse on day 1 followed by    Dose reduced by 20%(320 mg/m2) starting Cycle 18 ([place] #11) 7/5/16 due to neutropenia   Fluorouracil 1200 mg/m2 IV continuous infusion daily on days 1 and 2 (2400 mg/m2 IV over 46-48 hours)    Infusion dose reduced by 20% starting Cycle 18 ([place] #11)  7/5/16 due to neutropenia    14 day cycles for 12 cycles (adjuvant) or until disease progression or unacceptable toxicity  NCCN Guidelines for Colon Cancer.V.2.2015  Jay T, et al. Eur J Cancer.1999;35(9):1343-7    Allergies:  Codeine; Pcn; Sulfa drugs; and Tape     /80 mmHg  Pulse 90  Temp(Src) 36.3 °C (97.3 °F)  Resp 18  Ht 1.651 m (5' 5\")  Wt 77.9 kg (171 lb 11.8 oz)  BMI 28.58 kg/m2  SpO2 97% Body surface area is 1.89 meters squared.     ANC~ 1330 (ok to treat per Cyndee LUCERO)   Plt = 178k   Hgb = 12.2  SCr = 1.12mg/dL CrCl ~ 53 mL/min  AST/ALT/AP/TBili = WNL    K+ = 3.7    Drug Order   (Drug name, dose, route, IV Fluid & volume, frequency, number of doses) Cycle 33 ([place] C26)   Previous treatment: 2/17/17   Medication = Leucovorin  Base Dose= 400 mg/m2  Calc Dose: Base Dose x 1.89 m2 = 756 mg  Final Dose = 756 mg  Route = IV  Fluid & Volume = D5W 250 mL  Admin Duration = Over 2 hours          <5% difference, OK to treat with final dose   Medication = Fluorouracil (5-FU)  Base Dose= 320 mg/m2   Calc Dose: Base Dose x 1.89 m2 = 605 mg  Final Dose = 605 mg  Route = IV push  Conc & Volume = 50 mg/mL = 12.1 mL  Admin Duration = Over 5 minutes       <5% difference, OK to treat with final dose   Medication = Fluorouracil (5-FU)  Base Dose= 1920 mg/m2  Calc Dose:Base Dose x 1.89 m2 = 3629 mg  Final Dose = 3630 mg  Route = CIVI  Fluid & Volume = 72.6 mL   (with 3 ml overfill = 75.6 ml into CADD pump)  Admin Duration = " Over 46 hours at 1.6 ml/hr   In CADD cassette for home CIVI       <5% difference, OK to treat with final dose     By my signature below, I confirm this process was performed independently with the BSA and all final chemotherapy dosing calculations congruent. I have reviewed the above chemotherapy order and that my calculation of the final dose and BSA (when applicable) corroborate those calculations of the  pharmacist. Discrepancies of 5% or greater in the written dose have been addressed and documented within the EPIC Progress notes.    Norris Mitchell, PharmD

## 2017-02-28 NOTE — PROGRESS NOTES
Patient arrived to Infusion post appt with Dr. Cotter; RN spoke to Dr. Cotter to verify pt ok to receive treatment today, as ANC was 1300.  Dr. Cotter stated ok for pt to be treated, treatment plan signed, confirmed with pharmacy. Port previously accessed from morning appt; flushed, brisk blood return noted.  Pre-meds given.  Leucovorin infused as ordered.  5FU push given, connected CADD pump to pt as ordered. Confirmed dose with fellow RN.  Pump placed in bag and attached per pt preference.  Confirmed de-access appt for Thursday March 2nd.  Pt and  left in great spirits under no apparent distress.

## 2017-02-28 NOTE — PROGRESS NOTES
"Pharmacy Chemotherapy [facility]    Patient Name: Karlene Walters      Dx: Colon CA     Cycle:  33 ([place] cycle 26)   Previous treatment: C32 on 2/17/17     Protocol: 5 FU/Leucovorin    Leucovorin 400 mg/m² IV over 2 hours on Day 1, followed by  Fluorouracil 400 mg/m²  IVP on Day 1, followed by   - Dose reduced by 20% to 320 mg/m² starting with Cycle 18 ([place] #11) on 7/5/16 due to neutropenia    Fluorouracil 1200 mg/m²  IV continuous infusion daily on Days 1 and 2 (2400 mg/m² IV over 46-48 hours) -   - Infusion Dose reduced by 20% starting with Cycle 18 ([place] #11) on 7/5/16 due to neutropenia   -20% reduction (1920 mg/m2) to be continued starting C28 per C Alsop APRN   14 day cycles for 12 cycles (adjuvant) or until disease progression or unacceptable toxicity  NCCN Guidelines for Colon Cancer. V.2.2015.  Jay T, et al. Eur J Cancer.1999;35(9):1343-7.          Allergies: Codeine; Oxaliplatin; Pcn; Sulfa drugs; and Tape  /80 mmHg  Pulse 90  Temp(Src) 36.3 °C (97.3 °F)  Resp 18  Ht 1.651 m (5' 5\")  Wt 77.9 kg (171 lb 11.8 oz)  BMI 28.58 kg/m2  SpO2 97% Body surface area is 1.89 meters squared.     ANC~ 1300--okay to proceed per MD Plt = 178k   Hgb = 12.2     SCr = 1.12mg/dL CrCl ~ 53mL/min   LFT's = WNL TBili = 0.7     Leucovorin 400 mg/m² x 1.89m² = 756 mg   <5% difference, OK to treat with final dose = 756 mg IV    Fluorouracil (5-FU) 320 mg/m² x 1.89 m² = 604.8 mg   <5% difference, OK to treat with final dose = 605 mg IVP    Fluorouracil (5-FU) 1920 mg/m2 x 1.89 m2 = 3628.8 mg   <5% difference, OK to treat with final dose = 3630 mg CIVI   over 46 hours per home infusion pump.     Tiffanie Scanlon, PharmD         "

## 2017-02-28 NOTE — PROGRESS NOTES
Patient arrived to Infusion for pre-chemo Port access/lab draw.  Pt applied emla creme prior at home; wiped port site clean.  Port accessed in sterile field, flushed well with brisk blood return noted.  Labs drawn as ordered.  Hypoallergenic dressing applied, port flushed per protocol, saline capped.  Pt heading to appt with Dr. Cotter at 0945, and will return for possible treatment at 1030.  Pt left in great spirits with .

## 2017-02-28 NOTE — PROGRESS NOTES
Follow Up Note: Oncology     Date: 2/28/17  Time: 9:40 am     Primary care physician: Dr. Quiñonez  Surgery: Dr. Ulises Kwok Oncologist: Dr. Orosco  Plastic surgery: Dr. Camacho  Gastroenterology: [facility]  Surgery: Dr. West   Cardiology: Dr. Haque    Diagnosis: Metastatic Invasive moderately differenated rectal cancer to liver    Chief complaint: Patient is here for a follow-up visit and pre-chemotherapy visit    History of presenting illness:  Ms. Walters is a 76-year-old female with with metastatic rectal cell carcinoma to the liver. 2012 she was diagnosed with rectal carcinoma secondary to bleeding and underwent a laparoscopic resection with primary anastomosis.  Pathology was consistent with well-differentiated adenocarcinoma with 0/30 nodes and initially was staged pT2 N0 with all four mismatch appeared genes expressed.  She had multiple complications postsurgery as well as breakdown anastomosis and rectovaginal fistula requiring repair and eventually a diverting loop ostomy. She was found to have metastatic disease to liver in 8/2015 with a 2.4 cm mass as well as small pulmonary nodules and thyroid nodules. She underwent therapy to the liver. She was initially started on Xelox was unable to tolerate oxaliplatin. She was then continued on single agent Xeloda met had increased diarrhea. Her regimen was then changed to single agent 5-FU which she has been tolerating well. Her CEA levels have been fairly stable with recent slight increase. 12/2016 CT scan showed no evidence of progression of disease. She is continued on her current regiment. She had some skin toxicities prior to her last cycle which have improved. He also had some swelling of her right hand of unclear etiology. X-ray and ultrasound were negative for any acute changes.    Interval History:  She is here for follow up visit and is accompanied by her  who was present in the room. She has been fairly stable since her last visit.  She  continues to have intermittent swelling of her hands and feet with some swelling of her right hand which has improved. She has been on Lasix to be taken as needed with improvement in swelling in her feet. She has noticed occasional dry blood in her nose in the mornings but denies any janelle nosebleeds. She otherwise is tolerating the regimen well. She denies any nausea, vomiting or abdominal pain. She denies any cough or shortness of breath. She is having regular bowel movements. She denies any fevers, chills or night sweats.    Past Medical History:  1. Metastatic rectal cancer   2. Rectal cancer 2012  3. PE  4. Fibroids and ovarian cysts   5. HTN   6. HLP      Allergies:  Codeine; Oxaliplatin; Pcn; Sulfa drugs; and Tape      Medications:  Current Outpatient Prescriptions on File Prior to Visit   Medication Sig Dispense Refill   • rivaroxaban (XARELTO) 20 MG Tab tablet Take 1 Tab by mouth with dinner. 30 Tab 6   • ranitidine (ZANTAC) 150 MG Tab Take 1 Tab by mouth 2 Times a Day. 60 Tab 3   • famotidine (PEPCID) 40 MG Tab Take 1 Tab by mouth every day. 90 Tab 2   • cyanocobalamin (VITAMIN B-12) 500 MCG Tab Take 500 mcg by mouth every day.     • Multiple Vitamins-Minerals (CENTRUM SILVER PO) Take  by mouth.     • Cholecalciferol (VITAMIN D3) 400 UNITS CAPS Take 1 Cap by mouth every day.     • loratadine (CLARITIN) 10 MG TABS Take 10 mg by mouth every day. Indications: Hayfever     • furosemide (LASIX) 20 MG Tab Take 1 Tab by mouth every day. PRN EDEMA 30 Tab 3   • potassium chloride ER (KLOR-CON) 10 MEQ tablet Take 1 Tab by mouth every day. 30 Tab 3   • lidocaine-prilocaine (EMLA) 2.5-2.5 % Cream APPLY A THICK FILM OVER THE PORT ACCESS SITE PRIOR TO ACCESS 30 g 2   • loperamide (IMODIUM) 1 MG/5ML Liquid Take 2 mg by mouth 4 times a day as needed for Diarrhea.     • ondansetron (ZOFRAN) 4 MG Tab tablet      • ibuprofen (MOTRIN) 200 MG Tab Take 200 mg by mouth every 6 hours as needed.     • oxycodone immediate-release  "(ROXICODONE) 5 MG Tab Take 5 mg by mouth every four hours as needed for Severe Pain.       No current facility-administered medications on file prior to visit.       Review of Systems:  All other review of systems are negative except what was mentioned above in the HPI.  Constitutional: Negative for fever and chills. Positive for mild malaise/fatigue.   HENT: Negative for ear pain and nosebleeds  Eyes: Negative for blurred vision.   Respiratory: Negative for cough and sputum production.   Positive for intermittent shortness of breath on exertion.   Cardiovascular: Negative for chest pain and  Palpitations. Positive for intermittent leg swelling    Gastrointestinal: Negative for nausea, vomiting and abdominal pain.   Genitourinary: Negative for dysuria.    Musculoskeletal: Negative for myalgias and back pain.  positive for right hip pain stable.  Intermittent swelling of her right arm   Skin: Bilateral skin changes in her hands improved.   Neurological: negative for dizziness, negative for focal weakness and headaches.   Endo/Heme/Allergies: No bruise/bleed easily.   Psychiatric/Behavioral: Negative for depression.  Positive for mild memory changes stable.     Physical Exam:  Vitals:  Filed Vitals:    02/28/17 0932   BP: 162/88   Pulse: 82   Temp: 36.6 °C (97.9 °F)   Resp: 16   Height: 1.651 m (5' 5\")   Weight: 77.883 kg (171 lb 11.2 oz)   SpO2: 98%       General: Not in acute distress, alert and oriented x 3  HEENT: normalcephalic, atraumatic, pupils equally reactive to light bilaterally, extra ocular muscles intact, moist oral oral mucus membranes, and oral cavity without any lesions.  Neck: Supple neck and full range of motion  Lymph nodes: No palpable bilateral cervical and supraclavicular lymphadenopathy.   CVS:Regular rate and rythem  RESP: Clear to auscultate bilaterally.   ABD: Soft, non tender, non distended, and positive bowel sounds.    EXT: No edema   Right arm with minimal swelling. No palpable masses or " nodule and nontender.  Bilateral hands with resolution of skin changes.  CNS: Alert and oriented x3, cranial nerves grossly intact, muscle strength grossly intact, and normal gait.     Labs:  1. CEA:  a. 1/31/17: 6.1  b. 1/3/17: 3.3  c. 12/5/16: 2.2  d. 11/8/16: 1.4  e. 10/25/13: 1.3  f. 8/30/16: 1  g. 8/2/16: 1.1  h. 6/7/16: 1.2  i. 3/29/16: 2  j. 3/1/16: 1.5  k. 2/2/16: 1.5   l. 12/22/15: 2.3  m. 10/26/15: 2.9      [facility] on 02/28/2017   Component Date Value Ref Range Status   • WBC 02/28/2017 2.9* 4.8 - 10.8 K/uL Final   • RBC 02/28/2017 3.94* 4.20 - 5.40 M/uL Final   • Hemoglobin 02/28/2017 12.2  12.0 - 16.0 g/dL Final   • Hematocrit 02/28/2017 37.4  37.0 - 47.0 % Final   • MCV 02/28/2017 94.9  81.4 - 97.8 fL Final   • MCH 02/28/2017 31.0  27.0 - 33.0 pg Final   • MCHC 02/28/2017 32.6* 33.6 - 35.0 g/dL Final   • RDW 02/28/2017 62.4* 35.9 - 50.0 fL Final   • Platelet Count 02/28/2017 178  164 - 446 K/uL Final   • MPV 02/28/2017 9.7  9.0 - 12.9 fL Final   • Neutrophils-Polys 02/28/2017 45.60  44.00 - 72.00 % Final   • Lymphocytes 02/28/2017 37.10  22.00 - 41.00 % Final   • Monocytes 02/28/2017 12.90  0.00 - 13.40 % Final   • Eosinophils 02/28/2017 3.10  0.00 - 6.90 % Final   • Basophils 02/28/2017 1.00  0.00 - 1.80 % Final   • Immature Granulocytes 02/28/2017 0.30  0.00 - 0.90 % Final   • Nucleated RBC 02/28/2017 0.00   Final   • Neutrophils (Absolute) 02/28/2017 1.30* 2.00 - 7.15 K/uL Final    Includes immature neutrophils, if present.   • Lymphs (Absolute) 02/28/2017 1.06  1.00 - 4.80 K/uL Final   • Monos (Absolute) 02/28/2017 0.37  0.00 - 0.85 K/uL Final   • Eos (Absolute) 02/28/2017 0.09  0.00 - 0.51 K/uL Final   • Baso (Absolute) 02/28/2017 0.03  0.00 - 0.12 K/uL Final   • Immature Granulocytes (abs) 02/28/2017 0.01  0.00 - 0.11 K/uL Final   • NRBC (Absolute) 02/28/2017 0.00   Final   • Carcinoembryonic Antigen 02/28/2017 5.0* 0.0 - 3.0 ng/mL Final    Comment: Effective September 17,  2013 the quantitative determination  of Carcinoembryonic Antigen (CEA) will now be performed at  [facility].  The Access CEA paramagnetic  particle chemiluminescent immunoassay is used.  Results  obtained with different test methods or kits cannot be used interchangeably.  Measurement of CEA has been shown to be  clinically relevant in the management of patients with  colorectal, breast, lung, prostatic, pancreatic, and ovarian  carcinomas.  Smokers may have slightly elevated levels of CEA.     • Sodium 2017 139  135 - 145 mmol/L Final   • Potassium 2017 3.7  3.6 - 5.5 mmol/L Final   • Chloride 2017 109  96 - 112 mmol/L Final   • Co2 2017 22  20 - 33 mmol/L Final   • Anion Gap 2017 8.0  0.0 - 11.9 Final   • Glucose 2017 110* 65 - 99 mg/dL Final   • Bun 2017 22  8 - 22 mg/dL Final   • Creatinine 2017 1.12  0.50 - 1.40 mg/dL Final   • Calcium 2017 9.6  8.5 - 10.5 mg/dL Final   • AST(SGOT) 2017 20  12 - 45 U/L Final   • ALT(SGPT) 2017 15  2 - 50 U/L Final   • Alkaline Phosphatase 2017 99  30 - 99 U/L Final   • Total Bilirubin 2017 0.7  0.1 - 1.5 mg/dL Final   • Albumin 2017 3.8  3.2 - 4.9 g/dL Final   • Total Protein 2017 6.0  6.0 - 8.2 g/dL Final   • Globulin 2017 2.2  1.9 - 3.5 g/dL Final   • A-G Ratio 2017 1.7   Final   • GFR If  2017 57* >60 mL/min/1.73 m 2 Final   • GFR If Non  2017 47* >60 mL/min/1.73 m 2 Final   ]    Imagin. 17 ultrasound: No evidence of deep or superficial venous thrombosis involving the right upper extremity.  2. 17 X-ray: No acute fracture and no bony destructive lesions identified. Arthritic changes.    Assessment and Plan:    1. Metastatic rectal cancer, KRAS positive: Patient with metastatic rectal cell carcinoma to liver. She has been on new agent 5-FU and has been tolerating it well. Her last imaging showed no  evidence of progression of disease. Her CEAs have fluctuated and recently have been trending up. She is due to get repeat CEA levels today. If her CEA continues to trend up then I recommend PET CT scan for reassessment and likely will be doing PET/CT of whole body. He is continuing on her current regimen.  2. History of pulmonary embolus: He was diagnosed in 8/2015 and was initially treated with Coumadin then changed to Xarelto. She had completed 6 months of therapy but was continued on anticoagulation secondary to underlying malignancy. Patient does not have any janelle nosebleeds but occasional dry blood in her nose. If she were to developedthen we will consider stopping anticoagulation.  3. Sinus tachycardia: She continues to have sinus tachycardia and is followed by cardiology. She has been recently started on Lasix to be taken as needed.  4. Right arm swelling: Unclear etiology. She continues to have mild swelling of the right hand above the wrist. There is no palpable masses or tenderness. Ultrasound was negative for blood clots and x-ray also showed no acute changes. Continue to monitor.  5. Anal repair and reconstruction: Patient was seen by Dr. Choi and has had multiple surgeries to this area. Further surgery has been deferred. She continues to follow-up with Dr. Choi periodically.  6. She is to follow-up again in approximately 4 weeks or sooner as needed.    She agreed and verbalized her agreement and understanding with the current plan. I answered all questions and concerns she has at this time and advised her to call at any time in the interim with questions or concerns in regards to her care. Thank you for allowing me to participate in her care, I will continue to follow.    Please note that this dictation was created using voice recognition software. I have made every reasonable attempt to correct obvious errors, but I expect that there are errors of grammar and possibly content that I did not discover  before finalizing the note.

## 2017-03-02 ENCOUNTER — OUTPATIENT INFUSION SERVICES (OUTPATIENT)
Dept: ONCOLOGY | Facility: MEDICAL CENTER | Age: 77
End: 2017-03-02
Attending: INTERNAL MEDICINE
Payer: MEDICARE

## 2017-03-02 VITALS
SYSTOLIC BLOOD PRESSURE: 137 MMHG | TEMPERATURE: 97.5 F | HEART RATE: 76 BPM | WEIGHT: 173.94 LBS | HEIGHT: 65 IN | OXYGEN SATURATION: 100 % | DIASTOLIC BLOOD PRESSURE: 59 MMHG | BODY MASS INDEX: 28.98 KG/M2 | RESPIRATION RATE: 18 BRPM

## 2017-03-02 DIAGNOSIS — R94.4 DECREASED GFR: ICD-10-CM

## 2017-03-02 DIAGNOSIS — C20 RECTAL CANCER (HCC): ICD-10-CM

## 2017-03-02 PROCEDURE — 96523 IRRIG DRUG DELIVERY DEVICE: CPT

## 2017-03-02 PROCEDURE — 700111 HCHG RX REV CODE 636 W/ 250 OVERRIDE (IP)

## 2017-03-02 RX ADMIN — HEPARIN 500 UNITS: 100 SYRINGE at 11:50

## 2017-03-02 ASSESSMENT — PAIN SCALES - GENERAL: PAINLEVEL: NO PAIN

## 2017-03-02 NOTE — PROGRESS NOTES
Patient presents for disconnect from home pump. Home pump alarming that the infusion is complete. Pump turned off and disconnected. Port flushed per protocol and de-accessed, sterile gauze to site. Pump cartridge disposed of in chemotherapy waste and batteries removed. Pump placed in proper storage in pharmacy. Patient scheduled for next appointment and released in no acute distress.

## 2017-03-14 ENCOUNTER — OUTPATIENT INFUSION SERVICES (OUTPATIENT)
Dept: ONCOLOGY | Facility: MEDICAL CENTER | Age: 77
End: 2017-03-14
Attending: INTERNAL MEDICINE
Payer: MEDICARE

## 2017-03-14 ENCOUNTER — TELEPHONE (OUTPATIENT)
Dept: HEMATOLOGY ONCOLOGY | Facility: MEDICAL CENTER | Age: 77
End: 2017-03-14

## 2017-03-14 VITALS
BODY MASS INDEX: 28.61 KG/M2 | TEMPERATURE: 97.2 F | HEART RATE: 88 BPM | WEIGHT: 171.74 LBS | HEIGHT: 65 IN | DIASTOLIC BLOOD PRESSURE: 61 MMHG | RESPIRATION RATE: 18 BRPM | OXYGEN SATURATION: 100 % | SYSTOLIC BLOOD PRESSURE: 128 MMHG

## 2017-03-14 DIAGNOSIS — C20 RECTAL CANCER (HCC): ICD-10-CM

## 2017-03-14 DIAGNOSIS — R94.4 DECREASED GFR: ICD-10-CM

## 2017-03-14 LAB
ALBUMIN SERPL BCP-MCNC: 4 G/DL (ref 3.2–4.9)
ALBUMIN/GLOB SERPL: 1.7 G/DL
ALP SERPL-CCNC: 107 U/L (ref 30–99)
ALT SERPL-CCNC: 16 U/L (ref 2–50)
ANION GAP SERPL CALC-SCNC: 8 MMOL/L (ref 0–11.9)
ANISOCYTOSIS BLD QL SMEAR: ABNORMAL
AST SERPL-CCNC: 24 U/L (ref 12–45)
BASOPHILS # BLD AUTO: 1.7 % (ref 0–1.8)
BASOPHILS # BLD: 0.04 K/UL (ref 0–0.12)
BILIRUB SERPL-MCNC: 0.9 MG/DL (ref 0.1–1.5)
BUN SERPL-MCNC: 17 MG/DL (ref 8–22)
CALCIUM SERPL-MCNC: 9.6 MG/DL (ref 8.5–10.5)
CEA SERPL-MCNC: 7.9 NG/ML (ref 0–3)
CHLORIDE SERPL-SCNC: 109 MMOL/L (ref 96–112)
CO2 SERPL-SCNC: 22 MMOL/L (ref 20–33)
CREAT SERPL-MCNC: 1.05 MG/DL (ref 0.5–1.4)
EOSINOPHIL # BLD AUTO: 0.3 K/UL (ref 0–0.51)
EOSINOPHIL NFR BLD: 12.4 % (ref 0–6.9)
ERYTHROCYTE [DISTWIDTH] IN BLOOD BY AUTOMATED COUNT: 69.1 FL (ref 35.9–50)
GFR SERPL CREATININE-BSD FRML MDRD: 51 ML/MIN/1.73 M 2
GLOBULIN SER CALC-MCNC: 2.3 G/DL (ref 1.9–3.5)
GLUCOSE SERPL-MCNC: 110 MG/DL (ref 65–99)
HCT VFR BLD AUTO: 38.6 % (ref 37–47)
HGB BLD-MCNC: 12.7 G/DL (ref 12–16)
LYMPHOCYTES # BLD AUTO: 1.13 K/UL (ref 1–4.8)
LYMPHOCYTES NFR BLD: 46.9 % (ref 22–41)
MANUAL DIFF BLD: NORMAL
MCH RBC QN AUTO: 32 PG (ref 27–33)
MCHC RBC AUTO-ENTMCNC: 32.9 G/DL (ref 33.6–35)
MCV RBC AUTO: 97.2 FL (ref 81.4–97.8)
MICROCYTES BLD QL SMEAR: ABNORMAL
MONOCYTES # BLD AUTO: 0.32 K/UL (ref 0–0.85)
MONOCYTES NFR BLD AUTO: 13.3 % (ref 0–13.4)
MORPHOLOGY BLD-IMP: NORMAL
NEUTROPHILS # BLD AUTO: 0.6 K/UL (ref 2–7.15)
NEUTROPHILS NFR BLD: 24.8 % (ref 44–72)
NRBC # BLD AUTO: 0 K/UL
NRBC BLD AUTO-RTO: 0 /100 WBC
PLATELET # BLD AUTO: 168 K/UL (ref 164–446)
PLATELET BLD QL SMEAR: NORMAL
PMV BLD AUTO: 9.9 FL (ref 9–12.9)
POTASSIUM SERPL-SCNC: 3.7 MMOL/L (ref 3.6–5.5)
PROMYELOCYTES NFR BLD MANUAL: 0.9 %
PROT SERPL-MCNC: 6.3 G/DL (ref 6–8.2)
RBC # BLD AUTO: 3.97 M/UL (ref 4.2–5.4)
RBC BLD AUTO: PRESENT
SODIUM SERPL-SCNC: 139 MMOL/L (ref 135–145)
WBC # BLD AUTO: 2.4 K/UL (ref 4.8–10.8)

## 2017-03-14 PROCEDURE — 82378 CARCINOEMBRYONIC ANTIGEN: CPT

## 2017-03-14 PROCEDURE — 36591 DRAW BLOOD OFF VENOUS DEVICE: CPT

## 2017-03-14 PROCEDURE — A4212 NON CORING NEEDLE OR STYLET: HCPCS

## 2017-03-14 PROCEDURE — 85007 BL SMEAR W/DIFF WBC COUNT: CPT

## 2017-03-14 PROCEDURE — 85027 COMPLETE CBC AUTOMATED: CPT

## 2017-03-14 PROCEDURE — 80053 COMPREHEN METABOLIC PANEL: CPT

## 2017-03-14 PROCEDURE — 700111 HCHG RX REV CODE 636 W/ 250 OVERRIDE (IP): Performed by: NURSE PRACTITIONER

## 2017-03-14 RX ADMIN — HEPARIN 500 UNITS: 100 SYRINGE at 11:40

## 2017-03-14 ASSESSMENT — PAIN SCALES - GENERAL: PAINLEVEL: NO PAIN

## 2017-03-14 NOTE — TELEPHONE ENCOUNTER
Patient calling to clarify whether or not she needs a CT scan done. She informs me her chemo was cancelled today. She would like to know if she needs a CT scan and if so if it could include her arms. Please advise.

## 2017-03-14 NOTE — PROGRESS NOTES
"Pharmacy Chemotherapy Calculation:    CHEMOTHERAPY DEFERRED FOR 1 WEEK DUE TO LOW ANC    Patient Name: Karlene Walters      Dx: Colon CA       Previous treatment: C33 = 2/28/17     Protocol: 5 FU/Leucovorin    Leucovorin 400 mg/m² IV over 2 hours on Day 1, followed by  Fluorouracil 400 mg/m²  IVP on Day 1, followed by   - Dose reduced by 20% to 320 mg/m² starting with Cycle 18 (Reading #11) on 7/5/16 due to neutropenia    Fluorouracil 1200 mg/m²  IV continuous infusion daily on Days 1 and 2 (2400 mg/m² IV over 46-48 hours) -   - Infusion Dose reduced by 20% starting with Cycle 18 (Reading #11) on 7/5/16 due to neutropenia   -20% reduction (1920 mg/m2) to be continued starting C28 per C Alsop APRN   14 day cycles for 12 cycles (adjuvant) or until disease progression or unacceptable toxicity  NCCN Guidelines for Colon Cancer. V.2.2015.  Jay T, et al. Eur J Cancer.1999;35(9):1343-7.          Allergies: Codeine; Oxaliplatin; Pcn; Sulfa drugs; and Tape  /61 mmHg  Pulse 88  Temp(Src) 36.2 °C (97.2 °F)  Resp 18  Ht 1.651 m (5' 5\")  Wt 77.9 kg (171 lb 11.8 oz)  BMI 28.58 kg/m2  SpO2 100% Body surface area is 1.89 meters squared.     ANC~ 600 Plt = 168k   Hgb = 12.7     SCr = 1.05mg/dL CrCl ~ 56mL/min   LFT's = WNL, except AP = 107 TBili = 0.9     = 0mg IV     = 0mg IVP     = 0mg CIVI   over 46 hours per home infusion pump.     DARIN Arroyo, Pharm.D.          "

## 2017-03-14 NOTE — PROGRESS NOTES
Pt arrived to [facility] ambulatory, here for planned chemo. Pt with EMLA in place to L chest port. EMLA removed, site cleansed, and port accessed in sterile fashion. Brisk blood return observed. Labs drawn as ordered. Results reviewed and pt neutropenic. Call placed to Ella MORALES and orders received to defer pt for one week as well as orders for pt to return this weekend for CBCs and possible Neupogen. Pt provided with printout of future appts and the remaining appts will need to be adjusted - pt aware. Apparently, pt has a planned cruise to [place] that she is hoping chemo will not interfere with. Port flushed, heparin instilled, and needle de-accessed intact. Gauze dressing applied. Pt knows to return Saturday and Sunday for labs/possible Neupogen.

## 2017-03-14 NOTE — PROGRESS NOTES
Sofiya from Lab called with critical result of WBC 2.4 at 1015. Critical lab result read back to Sofiya.   Dr. Cotter will be notified after other labs have resulted.

## 2017-03-15 ENCOUNTER — TELEPHONE (OUTPATIENT)
Dept: HEMATOLOGY ONCOLOGY | Facility: MEDICAL CENTER | Age: 77
End: 2017-03-15

## 2017-03-15 DIAGNOSIS — M79.89 SWELLING OF RIGHT UPPER EXTREMITY: ICD-10-CM

## 2017-03-15 DIAGNOSIS — C20 RECTAL CANCER (HCC): ICD-10-CM

## 2017-03-15 NOTE — TELEPHONE ENCOUNTER
Held patient's chemo yesterday d/t neutropenia. Patient with elevating CEA to 7.9 yesterday as well. Discussed with Dr. Cotter and will proceed with a CT CAP to evaluate disease. Also, order for CT extremity made as well d/t persistent swelling noted of the right upper extremity. Patient to have CBC with possible neupogen this Saturday and Sunday in prep for chemo next Tuesday, 3/21. Discussed the plan of care with patient via phone this morning and she is aware of the plan. Patient to follow up with Dr. Cotter next week to discuss Ct results.

## 2017-03-16 ENCOUNTER — HOSPITAL ENCOUNTER (OUTPATIENT)
Dept: RADIOLOGY | Facility: MEDICAL CENTER | Age: 77
End: 2017-03-16
Attending: NURSE PRACTITIONER
Payer: MEDICARE

## 2017-03-16 ENCOUNTER — APPOINTMENT (OUTPATIENT)
Dept: ONCOLOGY | Facility: MEDICAL CENTER | Age: 77
End: 2017-03-16
Attending: INTERNAL MEDICINE
Payer: MEDICARE

## 2017-03-16 VITALS
SYSTOLIC BLOOD PRESSURE: 127 MMHG | HEART RATE: 87 BPM | WEIGHT: 169.09 LBS | DIASTOLIC BLOOD PRESSURE: 75 MMHG | OXYGEN SATURATION: 97 % | HEIGHT: 65 IN | TEMPERATURE: 97 F | RESPIRATION RATE: 18 BRPM | BODY MASS INDEX: 28.17 KG/M2

## 2017-03-16 DIAGNOSIS — C20 RECTAL CANCER (HCC): ICD-10-CM

## 2017-03-16 DIAGNOSIS — M25.431 WRIST SWELLING, RIGHT: ICD-10-CM

## 2017-03-16 PROCEDURE — 96523 IRRIG DRUG DELIVERY DEVICE: CPT

## 2017-03-16 PROCEDURE — A4212 NON CORING NEEDLE OR STYLET: HCPCS

## 2017-03-16 PROCEDURE — 700111 HCHG RX REV CODE 636 W/ 250 OVERRIDE (IP)

## 2017-03-16 RX ADMIN — HEPARIN 500 UNITS: 100 SYRINGE at 10:31

## 2017-03-16 RX ADMIN — IOHEXOL 100 ML: 350 INJECTION, SOLUTION INTRAVENOUS at 16:07

## 2017-03-16 ASSESSMENT — PAIN SCALES - GENERAL: PAINLEVEL: 1=MINIMAL PAIN

## 2017-03-16 NOTE — PROGRESS NOTES
"Pt presents ambulatory with spouse for port accessed prior to CT scan. Pt reported that in the \"past she had post hydration\".  RN contacted Vivian BOWIE at MD office and creatine reviewed, WNL, no post hydration required with procedure today. Pt updated and verbalized understanding to hydrate orally with water. Emla cream wiped from site. Port accessed with sterile technique with CT power needle, blood return noted. Port flushed with saline and heparin, remains accessed for procedure. Pt to return on Saturday, print out given to pt. Pt left ambulatory with spouse in no distress.   "

## 2017-03-17 ENCOUNTER — OFFICE VISIT (OUTPATIENT)
Dept: HEMATOLOGY ONCOLOGY | Facility: MEDICAL CENTER | Age: 77
End: 2017-03-17
Payer: MEDICARE

## 2017-03-17 DIAGNOSIS — C20 RECTAL CANCER (HCC): ICD-10-CM

## 2017-03-17 PROCEDURE — G8432 DEP SCR NOT DOC, RNG: HCPCS | Performed by: INTERNAL MEDICINE

## 2017-03-17 PROCEDURE — 4040F PNEUMOC VAC/ADMIN/RCVD: CPT | Performed by: INTERNAL MEDICINE

## 2017-03-17 PROCEDURE — G8420 CALC BMI NORM PARAMETERS: HCPCS | Performed by: INTERNAL MEDICINE

## 2017-03-17 PROCEDURE — G8484 FLU IMMUNIZE NO ADMIN: HCPCS | Performed by: INTERNAL MEDICINE

## 2017-03-17 PROCEDURE — 99214 OFFICE O/P EST MOD 30 MIN: CPT | Performed by: INTERNAL MEDICINE

## 2017-03-17 PROCEDURE — 1101F PT FALLS ASSESS-DOCD LE1/YR: CPT | Mod: 8P | Performed by: INTERNAL MEDICINE

## 2017-03-17 PROCEDURE — 1036F TOBACCO NON-USER: CPT | Performed by: INTERNAL MEDICINE

## 2017-03-17 NOTE — PROGRESS NOTES
Follow Up Note: Oncology     Date: 3/17/17  Time: 2 pm     Primary care physician: Dr. Quiñonez  Surgery: Dr. Montgomery  Prior Oncologist: Dr. Orosco  Plastic surgery: Dr. Camacho  Gastroenterology: [facility]  Surgery: Dr. West   Cardiology: Dr. Haque    Diagnosis: Metastatic Invasive moderately differenated rectal cancer to liver    Chief complaint: Patient is here for a follow-up visit and pre-chemotherapy visit    History of presenting illness:  Ms. Walters is a 76-year-old female with metastatic rectal carcinoma to liver.  She was originally diagnosed with a rectal carcinoma in 2012 secondary to bleeding. She underwent laparoscopic resection with primary anastomosis and final pathology was consistent with well-differentiated adenocarcinoma with 0/30 nodes. She was staged pT2 N0. 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 8/2015 she was diagnosed with metastatic disease to the liver with a 2.4 cm mass as well as small pulmonary nodules and thyroid nodules. She underwent an ablative therapy to the liver. She was then started on Xelox. She was unable to tolerate oxaliplatin and was continued on Xeloda. This eventually led to increased diarrhea and her regimen was changed to single agent 5-FU. She has been tolerating the therapy well with good responses on imaging. More recently was found to have mild elevation of her CEA levels. She also had some skin toxicity which was minimal. She has been having swelling and tenderness of the right hand of unclear etiology with negative x-ray and ultrasound. Contrary to her CEA level she underwent a CT of the chest abdomen pelvis which has been stable and no evidence of disease. CT of the wrist was consistent with tenosynovitis and degenerative changes.      Interval History:   She is here for follow up visit and is accompanied by her  who was present in  the room. He has been feeling fairly stable since her last visit. She continues to have mild finding of intermittent pain of her right hand above the wrist. This has been persistent but not too bothersome. She otherwise has been tolerating the chemotherapy well. She is having regular bowel movements and denies any significant diarrhea. She denies any nausea, vomiting or abdominal pain. She denies any cough or shortness of breath. She has stable appetite and weight. She denies any fevers, chills or night sweats.      Past Medical History:  1. Metastatic rectal cancer   2. Rectal cancer 2012  3. PE  4. Fibroids and ovarian cysts   5. HTN   6. HLP      Allergies:  Codeine; Oxaliplatin; Pcn; Sulfa drugs; and Tape      Medications:  Current Outpatient Prescriptions on File Prior to Visit   Medication Sig Dispense Refill   • furosemide (LASIX) 20 MG Tab Take 1 Tab by mouth every day. PRN EDEMA 30 Tab 3   • potassium chloride ER (KLOR-CON) 10 MEQ tablet Take 1 Tab by mouth every day. 30 Tab 3   • rivaroxaban (XARELTO) 20 MG Tab tablet Take 1 Tab by mouth with dinner. 30 Tab 6   • ranitidine (ZANTAC) 150 MG Tab Take 1 Tab by mouth 2 Times a Day. 60 Tab 3   • famotidine (PEPCID) 40 MG Tab Take 1 Tab by mouth every day. 90 Tab 2   • lidocaine-prilocaine (EMLA) 2.5-2.5 % Cream APPLY A THICK FILM OVER THE PORT ACCESS SITE PRIOR TO ACCESS 30 g 2   • loperamide (IMODIUM) 1 MG/5ML Liquid Take 2 mg by mouth 4 times a day as needed for Diarrhea.     • ondansetron (ZOFRAN) 4 MG Tab tablet      • ibuprofen (MOTRIN) 200 MG Tab Take 200 mg by mouth every 6 hours as needed.     • oxycodone immediate-release (ROXICODONE) 5 MG Tab Take 5 mg by mouth every four hours as needed for Severe Pain.     • cyanocobalamin (VITAMIN B-12) 500 MCG Tab Take 500 mcg by mouth every day.     • Multiple Vitamins-Minerals (CENTRUM SILVER PO) Take  by mouth.     • Cholecalciferol (VITAMIN D3) 400 UNITS CAPS Take 1 Cap by mouth every day.     • loratadine  (CLARITIN) 10 MG TABS Take 10 mg by mouth every day. Indications: Hayfever       No current facility-administered medications on file prior to visit.       Review of Systems:  All other review of systems are negative except what was mentioned above in the HPI.  Constitutional: Negative for fever and chills. Positive for mild malaise/fatigue.   HENT: Negative for ear pain and nosebleeds  Eyes: Negative for blurred vision.   Respiratory: Negative for cough and sputum production.   Positive for intermittent shortness of breath on exertion.   Cardiovascular: Negative for chest pain and  palpitations. Positive for intermittent leg swelling    Gastrointestinal: Negative for nausea, vomiting and abdominal pain.   Genitourinary: Negative for dysuria.    Musculoskeletal: Negative for myalgias and back pain.  positive for right hip pain stable.  Positive for swelling of her right arm   Skin: Negative for skin rash  Neurological: negative for dizziness, negative for focal weakness and headaches.   Endo/Heme/Allergies: No bruise/bleed easily.   Psychiatric/Behavioral: Negative for depression.  Positive for mild memory changes stable.     Physical Exam:  Vitals:  There were no vitals filed for this visit.    General: Not in acute distress, alert and oriented x 3  HEENT: normalcephalic, atraumatic, pupils equally reactive to light bilaterally, extra ocular muscles intact, moist oral oral mucus membranes, and oral cavity without any lesions.  Neck: Supple neck and full range of motion  Lymph nodes: No palpable bilateral cervical and supraclavicular lymphadenopathy.   CVS:Regular rate and rythem  RESP: Clear to auscultate bilaterally.   ABD: Soft, non tender, non distended, and positive bowel sounds.    EXT: No edema   Right arm with minimal swelling and mild tenderness. No palpable masses or nodule and nontender.  CNS: Alert and oriented x3, cranial nerves grossly intact, muscle strength grossly intact, and normal gait.      Labs:  1. CEA:  a. 3/14/17: 7.9  b. 2/28/17: 5  c. 1/31/17: 6.1  d. 1/3/17: 3.3  e. 12/5/16: 2.2  f. 11/8/16: 1.4  g. 10/25/13: 1.3  h. 8/30/16: 1  i. 8/2/16: 1.1  j. 6/7/16: 1.2  k. 3/29/16: 2  l. 3/1/16: 1.5  m. 2/2/16: 1.5   n. 12/22/15: 2.3  o. 10/26/15: 2.9    No visits with results within 1 Day(s) from this visit.  Latest known visit with results is:    [facility] on 03/14/2017   Component Date Value Ref Range Status   • WBC 03/14/2017 2.4* 4.8 - 10.8 K/uL Final    Comment: Results confirmed by repeat testing.. This result has been called to AZEB  [id number] by Sofiya Singh on 03 14 2017 at 1015, and has been read back.     • RBC 03/14/2017 3.97* 4.20 - 5.40 M/uL Final   • Hemoglobin 03/14/2017 12.7  12.0 - 16.0 g/dL Final   • Hematocrit 03/14/2017 38.6  37.0 - 47.0 % Final   • MCV 03/14/2017 97.2  81.4 - 97.8 fL Final   • MCH 03/14/2017 32.0  27.0 - 33.0 pg Final   • MCHC 03/14/2017 32.9* 33.6 - 35.0 g/dL Final   • RDW 03/14/2017 69.1* 35.9 - 50.0 fL Final   • Platelet Count 03/14/2017 168  164 - 446 K/uL Final   • MPV 03/14/2017 9.9  9.0 - 12.9 fL Final   • Nucleated RBC 03/14/2017 0.00   Final   • NRBC (Absolute) 03/14/2017 0.00   Final   • Neutrophils-Polys 03/14/2017 24.80* 44.00 - 72.00 % Final   • Lymphocytes 03/14/2017 46.90* 22.00 - 41.00 % Final   • Monocytes 03/14/2017 13.30  0.00 - 13.40 % Final   • Eosinophils 03/14/2017 12.40* 0.00 - 6.90 % Final   • Basophils 03/14/2017 1.70  0.00 - 1.80 % Final   • Neutrophils (Absolute) 03/14/2017 0.60* 2.00 - 7.15 K/uL Final    Includes immature neutrophils, if present.   • Lymphs (Absolute) 03/14/2017 1.13  1.00 - 4.80 K/uL Final   • Monos (Absolute) 03/14/2017 0.32  0.00 - 0.85 K/uL Final   • Eos (Absolute) 03/14/2017 0.30  0.00 - 0.51 K/uL Final   • Baso (Absolute) 03/14/2017 0.04  0.00 - 0.12 K/uL Final   • Anisocytosis 03/14/2017 1+   Final   • Microcytosis 03/14/2017 1+   Final   • Sodium 03/14/2017 139  135 - 145 mmol/L Final   •  Potassium 03/14/2017 3.7  3.6 - 5.5 mmol/L Final   • Chloride 03/14/2017 109  96 - 112 mmol/L Final   • Co2 03/14/2017 22  20 - 33 mmol/L Final   • Anion Gap 03/14/2017 8.0  0.0 - 11.9 Final   • Glucose 03/14/2017 110* 65 - 99 mg/dL Final   • Bun 03/14/2017 17  8 - 22 mg/dL Final   • Creatinine 03/14/2017 1.05  0.50 - 1.40 mg/dL Final   • Calcium 03/14/2017 9.6  8.5 - 10.5 mg/dL Final   • AST(SGOT) 03/14/2017 24  12 - 45 U/L Final   • ALT(SGPT) 03/14/2017 16  2 - 50 U/L Final   • Alkaline Phosphatase 03/14/2017 107* 30 - 99 U/L Final   • Total Bilirubin 03/14/2017 0.9  0.1 - 1.5 mg/dL Final   • Albumin 03/14/2017 4.0  3.2 - 4.9 g/dL Final   • Total Protein 03/14/2017 6.3  6.0 - 8.2 g/dL Final   • Globulin 03/14/2017 2.3  1.9 - 3.5 g/dL Final   • A-G Ratio 03/14/2017 1.7   Final   • Carcinoembryonic Antigen 03/14/2017 7.9* 0.0 - 3.0 ng/mL Final    Comment: Effective September 17, 2013 the quantitative determination  of Carcinoembryonic Antigen (CEA) will now be performed at  [facility].  The Access CEA paramagnetic  particle chemiluminescent immunoassay is used.  Results  obtained with different test methods or kits cannot be used interchangeably.  Measurement of CEA has been shown to be  clinically relevant in the management of patients with  colorectal, breast, lung, prostatic, pancreatic, and ovarian  carcinomas.  Smokers may have slightly elevated levels of CEA.     • GFR If  03/14/2017 >60  >60 mL/min/1.73 m 2 Final   • GFR If Non  03/14/2017 51* >60 mL/min/1.73 m 2 Final   • Progranulocytes 03/14/2017 0.90   Final   • Manual Diff Status 03/14/2017 PERFORMED   Final   • Peripheral Smear Review 03/14/2017 see below   Final    Comment: Due to instrument suspect flags, further review of peripheral smear is  indicated on this patient sample. This review may or may not result in  abnormal findings.     • Plt Estimation 03/14/2017 Normal   Final   • RBC Morphology  2017 Present   Final   ]    Imagin. 3/16/17 CT CAP: Parenchymal scarring again seen in both lungs.  Fatty liver with posttreatment cavity in the RIGHT lobe, unchanged. Postoperative changes as described. No new lesions to indicate disease progression.  2. 3/26/17 CT wrist: Activity is edema along the radial aspect of the wrist with potential edema involving the abductor and extensor pollicis tendons possibly indicating tenosynovitis.  Consider further evaluation with MRI.  No fracture or dislocation of RIGHT wrist.  Degenerative change at the radial aspect of the wrist and base of thumb.    Assessment and Plan:  1. Metastatic rectal cancer, KRAS positive: She is currently on single agent 5-FU and has been tolerating it well. Imaging has not shown any evidence of progression of disease. Patient CEA however is higher and will need to be monitored closely. If she continues to have persistent elevation of her CEA then we’ll consider PET scan. She is also referred back to gastroenterology for possible repeat colonoscopy.  2. Right tenosynovitis and degenerative changes: She has mild swelling chest and persistent as well as some tenderness. She is advised to follow-up with order. Continue to monitor.  3. History of pulmonary embolus: He was diagnosed in 2015 and was initially treated with Coumadin then changed to Xarelto. She had completed 6 months of therapy but was continued on anticoagulation secondary to underlying malignancy.   4. Sinus tachycardia: Intermittent and has been fairly stable.  5. Anal repair and reconstruction: Patient was seen by Dr. Montgomery and has had multiple surgeries to this area. Further surgery has been deferred. She continues to follow-up with Dr. Montgomery periodically.  6. She is to follow-up again in approximately 4 weeks or sooner as needed.    She agreed and verbalized her agreement and understanding with the current plan. I answered all questions and concerns she has at this time  and advised her to call at any time in the interim with questions or concerns in regards to her care. Thank you for allowing me to participate in her care, I will continue to follow.    Please note that this dictation was created using voice recognition software. I have made every reasonable attempt to correct obvious errors, but I expect that there are errors of grammar and possibly content that I did not discover before finalizing the note.

## 2017-03-17 NOTE — MR AVS SNAPSHOT
"        Karlene Gilbert Romeo   3/17/2017 2:00 PM   Office Visit   MRN: [id number]    Department:  [facility]   Dept Phone:  [phone]    Description:  Female : 1940   Provider:  Radha Cotter M.D.           Allergies as of 3/17/2017     Allergen Noted Reactions    Codeine 2011       \"gets drunk\"    Oxaliplatin 10/27/2015   Anaphylaxis    Pcn [Penicillins] 2011   Itching    Sulfa Drugs 2011   Itching    Tape 2013   Rash    PAPER TAPE OK      Vital Signs     Smoking Status                   Never Smoker            Basic Information     Date Of Birth Sex Race Ethnicity Preferred Language    1940 Female White Non- English      Your appointments     Mar 17, 2017  2:00 PM   ONCOLOGY EST PATIENT 30 MIN with Radha Cotter M.D.   [facility] (--)    [address] [place] [address] [address]  [facility] [address]   [phone]            Mar 18, 2017 11:30 AM   Lab Draw with RN 1   [facility]fusion [facility] ([facility])    [address] [address]   [phone]            Mar 19, 2017 10:00 AM   Lab Draw with RN 1   [facility] ([facility])    [address] [address]   [phone]            Mar 21, 2017 11:00 AM   Est Chemo 2 with RN 1   [facility] ([facility])    [address] [address]   [phone]            Mar 23, 2017 11:30 AM   EST Port Flush / Central Line Care with RN 1   Infusion [facility] ([facility])    [address] [address]   [phone]            2017  8:00 AM   Est Chemo 2 with RN 4   Infusion [facility] ([facility])    [address] [address]   [phone]            2017  9:00 AM   EST Port Flush / Central Line Care with RN 2   Infusion [facility] ([facility])    [address] [address]   [phone]            2017  9:00 AM   ANNUAL EXAM PREVENTATIVE with Manan Quiñonez M.D.   [address] " ([facility])    [address] [facility]  [address]  [place] [address]   [phone]            Apr 18, 2017  8:30 AM   Lab Draw with INFUSION QUICK INJECT   Infusion [facility] ([facility])    [address] [facility] [address]  [place] [address]   [phone]            Apr 18, 2017  9:00 AM   ONCOLOGY EST PATIENT 30 MIN with Radha Cotter M.D.   [facility] (--)    [address], [address]  [place] [address]   [phone]            Apr 18, 2017 10:00 AM   Est Chemo 2 with RN 1   Infusion [facility] ([facility])    [address] [facility] [address]  [place] [address]   [phone]            Apr 20, 2017 11:00 AM   EST Port Flush / Central Line Care with INFUSION QUICK INJECT   Infusion [facility] ([facility])    [address] [facility] [address]  [place] [address]   [phone]            May 02, 2017  8:30 AM   Lab Draw with INFUSION QUICK INJECT   [facility]fusion [facility] ([facility])    [address] [facility] [address]  [place] [address]   [phone]            May 02, 2017  9:20 AM   ONCOLOGY EST PATIENT 30 MIN with Radha Cotter M.D.   [facility] (--)    [address], [address]  [place] [address]   [phone]            May 02, 2017 10:30 AM   Est Chemo 2 with RN 6   Infusion [facility] ([facility])    [address] [facility] [address]  [place] [address]   [phone]            May 04, 2017 11:30 AM   EST Port Flush / Central Line Care with RN 2   Infusion Services ([facility])    [address] [facility] [address]  [place] [address]   [phone]            Jul 19, 2017 10:30 AM   FOLLOW UP with Ahsan Haque M.D.   [facility] (--)    [address], [address]  [place] [address]   [phone]              Problem List              ICD-10-CM Priority Class Noted - Resolved    Rectal bleeding K62.5   9/20/2012 - Present    Rectal cancer (CMS-HCC) C20   9/26/2012 - Present    Hypertension I10 Medium  10/1/2012 - Present    Anemia D64.9   10/1/2012 - Present    Hypoalbuminemia E88.09   10/1/2012 - Present      Ileostomy care (CMS-HCC) Z43.2   10/13/2012 - Present    Rectovaginal fistula N82.3   2/27/2013 - Present    Digestive-genital tract fistula, 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   7/17/2013 - Present    Abnormal EKG R94.31 High  8/12/2013 - Present    Hemorrhage of rectum and anus K62.5   10/23/2013 - Present    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   10/15/2014 - Present    Thyroid nodule E04.1   12/19/2014 - Present    Secondary malignant neoplasm of liver (CMS-HCC) C78.7   7/6/2015 - Present    Dehydration E86.0   8/14/2015 - Present    Nausea & vomiting R11.2 Medium  8/15/2015 - Present    Colon cancer (CMS-HCC) C18.9   8/16/2015 - Present    Pulmonary embolism (CMS-[facility]) I26.99 High  8/18/2015 - Present    Ileitis K52.9   8/24/2015 - Present    Diarrhea R19.7   8/24/2015 - Present    Hyperlipidemia E78.5   9/17/2015 - Present    Lightheadedness R42   9/17/2015 - Present    Shortness of breath R06.02 High  10/5/2015 - Present    Routine general medical examination at a health care facility Z00.00   3/14/2016 - Present    Renal insufficiency N28.9   3/14/2016 - Present    Hyperglycemia R73.9   3/14/2016 - Present    Decreased GFR R94.4   8/16/2016 - Present    Chemotherapy-induced neutropenia (CMS-HCC) D70.1   9/28/2016 - Present      Health Maintenance        Date Due Completion Dates    IMM DTaP/Tdap/Td Vaccine (1 - Tdap) 4/13/1959 ---    BONE DENSITY 4/13/2005 ---    IMM PNEUMOCOCCAL 65+ (ADULT) LOW/MEDIUM RISK SERIES (2 of 2 - PCV13) 9/5/2007 9/5/2006    IMM INFLUENZA (1) 9/1/2016 10/18/2014, 9/5/2013, 10/12/2012    COLONOSCOPY 8/23/2025 8/23/2015, 9/20/2012            Current Immunizations     Influenza TIV (IM) 9/5/2013  6:12 AM, 10/12/2012  6:09 PM    Influenza Vaccine Quad Inj (Preserved) 10/18/2014    Pneumococcal polysaccharide vaccine (PPSV-23) 9/5/2006    SHINGLES VACCINE 1/14/2015      Below and/or attached are the medications your provider expects you to take. Review all of your home medications and newly ordered medications with your  provider and/or pharmacist. Follow medication instructions as directed by your provider and/or pharmacist. Please keep your medication list with you and share with your provider. Update the information when medications are discontinued, doses are changed, or new medications (including over-the-counter products) are added; and carry medication information at all times in the event of emergency situations     Allergies:  CODEINE - (reactions not documented)     OXALIPLATIN - Anaphylaxis     PCN - Itching     SULFA DRUGS - Itching     TAPE - Rash               Medications  Valid as of: March 17, 2017 -  1:46 PM    Generic Name Brand Name Tablet Size Instructions for use    Cholecalciferol (Cap) Vitamin D3 400 UNITS Take 1 Cap by mouth every day.        Cyanocobalamin (Tab) VITAMIN B-12 500 MCG Take 500 mcg by mouth every day.        Famotidine (Tab) PEPCID 40 MG Take 1 Tab by mouth every day.        Furosemide (Tab) LASIX 20 MG Take 1 Tab by mouth every day. PRN EDEMA        Ibuprofen (Tab) MOTRIN 200 MG Take 200 mg by mouth every 6 hours as needed.        Lidocaine-Prilocaine (Cream) EMLA 2.5-2.5 % APPLY A THICK FILM OVER THE PORT ACCESS SITE PRIOR TO ACCESS        Loperamide HCl (Liquid) IMODIUM 1 MG/5ML Take 2 mg by mouth 4 times a day as needed for Diarrhea.        Loratadine (Tab) CLARITIN 10 MG Take 10 mg by mouth every day. Indications: Hayfever        Multiple Vitamins-Minerals   Take  by mouth.        Ondansetron HCl (Tab) ZOFRAN 4 MG         OxyCODONE HCl (Tab) ROXICODONE 5 MG Take 5 mg by mouth every four hours as needed for Severe Pain.        Potassium Chloride (Tab CR) KLOR-CON 10 MEQ Take 1 Tab by mouth every day.        RaNITidine HCl (Tab) ZANTAC 150 MG Take 1 Tab by mouth 2 Times a Day.        Rivaroxaban (Tab) XARELTO 20 MG Take 1 Tab by mouth with dinner.        .                 Medicines prescribed today were sent to:     [facility] - [place], [place] - [facility]    [address] [place] [address]    Phone: [phone] Fax: [phone]    Open 24 Hours?: No      Medication refill instructions:       If your prescription bottle indicates you have medication refills left, it is not necessary to call your provider’s office. Please contact your pharmacy and they will refill your medication.    If your prescription bottle indicates you do not have any refills left, you may request refills at any time through one of the following ways: The online RunTitle system (except Urgent Care), by calling your provider’s office, or by asking your pharmacy to contact your provider’s office with a refill request. Medication refills are processed only during regular business hours and may not be available until the next business day. Your provider may request additional information or to have a follow-up visit with you prior to refilling your medication.   *Please Note: Medication refills are assigned a new Rx number when refilled electronically. Your pharmacy may indicate that no refills were authorized even though a new prescription for the same medication is available at the pharmacy. Please request the medicine by name with the pharmacy before contacting your provider for a refill.           RunTitle Access Code: [email or web address]  Expires: 4/11/2017 11:04 AM    RunTitle  A secure, online tool to manage your health information     [employer]’s RunTitle® is a secure, online tool that connects you to your personalized health information from the privacy of your home -- day or night - making it very easy for you to manage your healthcare. Once the activation process is completed, you can even access your medical information using the RunTitle bart, which is available for free in the Apple Bart store or Google Play store.     RunTitle provides the following levels of access (as shown below):   My Chart Features   [facility] Doctor [facility]  Specialists [facility]  Urgent  Care  [facility]  Primary [facility]  Doctor   Email your healthcare team securely and privately 24/7 X X X    Manage appointments: schedule your next appointment; view details of past/upcoming appointments X      Request prescription refills. X      View recent personal medical records, including lab and immunizations X X X X   View health record, including health history, allergies, medications X X X X   Read reports about your outpatient visits, procedures, consult and ER notes X X X X   See your discharge summary, which is a recap of your hospital and/or ER visit that includes your diagnosis, lab results, and care plan. X X       How to register for [employer]:  1. Go to  [email or web address].  2. Click on the Sign Up Now box, which takes you to the New Member Sign Up page. You will need to provide the following information:  a. Enter your [employer] Access Code exactly as it appears at the top of this page. (You will not need to use this code after you’ve completed the sign-up process. If you do not sign up before the expiration date, you must request a new code.)   b. Enter your date of birth.   c. Enter your home email address.   d. Click Submit, and follow the next screen’s instructions.  3. Create a [employer] ID. This will be your [employer] login ID and cannot be changed, so think of one that is secure and easy to remember.  4. Create a [employer] password. You can change your password at any time.  5. Enter your Password Reset Question and Answer. This can be used at a later time if you forget your password.   6. Enter your e-mail address. This allows you to receive e-mail notifications when new information is available in [employer].  7. Click Sign Up. You can now view your health information.    For assistance activating your [employer] account, call [phone]

## 2017-03-18 ENCOUNTER — OUTPATIENT INFUSION SERVICES (OUTPATIENT)
Dept: ONCOLOGY | Facility: MEDICAL CENTER | Age: 77
End: 2017-03-18
Attending: INTERNAL MEDICINE
Payer: MEDICARE

## 2017-03-18 VITALS
HEART RATE: 98 BPM | DIASTOLIC BLOOD PRESSURE: 76 MMHG | RESPIRATION RATE: 18 BRPM | BODY MASS INDEX: 28.1 KG/M2 | HEIGHT: 65 IN | OXYGEN SATURATION: 97 % | WEIGHT: 168.65 LBS | TEMPERATURE: 98.3 F | SYSTOLIC BLOOD PRESSURE: 143 MMHG

## 2017-03-18 DIAGNOSIS — T45.1X5A CHEMOTHERAPY-INDUCED NEUTROPENIA (HCC): ICD-10-CM

## 2017-03-18 DIAGNOSIS — D70.1 CHEMOTHERAPY-INDUCED NEUTROPENIA (HCC): ICD-10-CM

## 2017-03-18 LAB
ANISOCYTOSIS BLD QL SMEAR: ABNORMAL
BASOPHILS # BLD AUTO: 0.9 % (ref 0–1.8)
BASOPHILS # BLD: 0.04 K/UL (ref 0–0.12)
COMMENT 1642: NORMAL
DACRYOCYTES BLD QL SMEAR: NORMAL
EOSINOPHIL # BLD AUTO: 0.12 K/UL (ref 0–0.51)
EOSINOPHIL NFR BLD: 2.6 % (ref 0–6.9)
ERYTHROCYTE [DISTWIDTH] IN BLOOD BY AUTOMATED COUNT: 66.4 FL (ref 35.9–50)
HCT VFR BLD AUTO: 38.1 % (ref 37–47)
HGB BLD-MCNC: 12.8 G/DL (ref 12–16)
IMM GRANULOCYTES # BLD AUTO: 0.01 K/UL (ref 0–0.11)
IMM GRANULOCYTES NFR BLD AUTO: 0.2 % (ref 0–0.9)
LYMPHOCYTES # BLD AUTO: 1.49 K/UL (ref 1–4.8)
LYMPHOCYTES NFR BLD: 32.4 % (ref 22–41)
MACROCYTES BLD QL SMEAR: ABNORMAL
MCH RBC QN AUTO: 32.3 PG (ref 27–33)
MCHC RBC AUTO-ENTMCNC: 33.6 G/DL (ref 33.6–35)
MCV RBC AUTO: 96.2 FL (ref 81.4–97.8)
MONOCYTES # BLD AUTO: 0.6 K/UL (ref 0–0.85)
MONOCYTES NFR BLD AUTO: 13 % (ref 0–13.4)
MORPHOLOGY BLD-IMP: NORMAL
NEUTROPHILS # BLD AUTO: 2.34 K/UL (ref 2–7.15)
NEUTROPHILS NFR BLD: 50.9 % (ref 44–72)
NRBC # BLD AUTO: 0 K/UL
NRBC BLD AUTO-RTO: 0 /100 WBC
PLATELET # BLD AUTO: 237 K/UL (ref 164–446)
PLATELET BLD QL SMEAR: NORMAL
PMV BLD AUTO: 9.7 FL (ref 9–12.9)
POIKILOCYTOSIS BLD QL SMEAR: NORMAL
RBC # BLD AUTO: 3.96 M/UL (ref 4.2–5.4)
RBC BLD AUTO: PRESENT
WBC # BLD AUTO: 4.6 K/UL (ref 4.8–10.8)

## 2017-03-18 PROCEDURE — 85025 COMPLETE CBC W/AUTO DIFF WBC: CPT

## 2017-03-18 PROCEDURE — 36591 DRAW BLOOD OFF VENOUS DEVICE: CPT

## 2017-03-18 PROCEDURE — 700111 HCHG RX REV CODE 636 W/ 250 OVERRIDE (IP)

## 2017-03-18 RX ADMIN — HEPARIN 500 UNITS: 100 SYRINGE at 12:21

## 2017-03-18 ASSESSMENT — PAIN SCALES - GENERAL: PAINLEVEL: 1=MINIMAL PAIN

## 2017-03-18 NOTE — PROGRESS NOTES
Patient here for CBC/possible Neupogen. Port previously accessed; brisk blood return noted. Labs drawn per MD order. ANC = 2340. Patient does not meet parameters for Neupogen today. Heparin instilled prior to de-accessing port. Port de-accessed; gauze applied over site. Next appointment scheduled. Discharged to self care; no apparent distress noted.

## 2017-03-19 ENCOUNTER — APPOINTMENT (OUTPATIENT)
Dept: ONCOLOGY | Facility: MEDICAL CENTER | Age: 77
End: 2017-03-19
Attending: INTERNAL MEDICINE
Payer: MEDICARE

## 2017-03-21 ENCOUNTER — OUTPATIENT INFUSION SERVICES (OUTPATIENT)
Dept: ONCOLOGY | Facility: MEDICAL CENTER | Age: 77
End: 2017-03-21
Attending: INTERNAL MEDICINE
Payer: MEDICARE

## 2017-03-21 VITALS
BODY MASS INDEX: 28.47 KG/M2 | TEMPERATURE: 97.9 F | OXYGEN SATURATION: 97 % | SYSTOLIC BLOOD PRESSURE: 163 MMHG | HEIGHT: 65 IN | HEART RATE: 99 BPM | WEIGHT: 170.86 LBS | RESPIRATION RATE: 18 BRPM | DIASTOLIC BLOOD PRESSURE: 77 MMHG

## 2017-03-21 DIAGNOSIS — C18.9 MALIGNANT NEOPLASM OF COLON, UNSPECIFIED PART OF COLON (HCC): ICD-10-CM

## 2017-03-21 DIAGNOSIS — C20 RECTAL CANCER (HCC): ICD-10-CM

## 2017-03-21 DIAGNOSIS — R94.4 DECREASED GFR: ICD-10-CM

## 2017-03-21 LAB
ALBUMIN SERPL BCP-MCNC: 3.9 G/DL (ref 3.2–4.9)
ALBUMIN/GLOB SERPL: 1.4 G/DL
ALP SERPL-CCNC: 112 U/L (ref 30–99)
ALT SERPL-CCNC: 15 U/L (ref 2–50)
ANION GAP SERPL CALC-SCNC: 11 MMOL/L (ref 0–11.9)
AST SERPL-CCNC: 22 U/L (ref 12–45)
BILIRUB SERPL-MCNC: 0.6 MG/DL (ref 0.1–1.5)
BUN SERPL-MCNC: 17 MG/DL (ref 8–22)
CALCIUM SERPL-MCNC: 9.7 MG/DL (ref 8.5–10.5)
CHLORIDE SERPL-SCNC: 108 MMOL/L (ref 96–112)
CO2 SERPL-SCNC: 22 MMOL/L (ref 20–33)
CREAT SERPL-MCNC: 1.12 MG/DL (ref 0.5–1.4)
GFR SERPL CREATININE-BSD FRML MDRD: 47 ML/MIN/1.73 M 2
GLOBULIN SER CALC-MCNC: 2.8 G/DL (ref 1.9–3.5)
GLUCOSE SERPL-MCNC: 100 MG/DL (ref 65–99)
POTASSIUM SERPL-SCNC: 3.7 MMOL/L (ref 3.6–5.5)
PROT SERPL-MCNC: 6.7 G/DL (ref 6–8.2)
SODIUM SERPL-SCNC: 141 MMOL/L (ref 135–145)

## 2017-03-21 PROCEDURE — 96367 TX/PROPH/DG ADDL SEQ IV INF: CPT

## 2017-03-21 PROCEDURE — 80053 COMPREHEN METABOLIC PANEL: CPT

## 2017-03-21 PROCEDURE — 96409 CHEMO IV PUSH SNGL DRUG: CPT

## 2017-03-21 PROCEDURE — 700111 HCHG RX REV CODE 636 W/ 250 OVERRIDE (IP): Performed by: NURSE PRACTITIONER

## 2017-03-21 PROCEDURE — 96366 THER/PROPH/DIAG IV INF ADDON: CPT

## 2017-03-21 PROCEDURE — G0498 CHEMO EXTEND IV INFUS W/PUMP: HCPCS

## 2017-03-21 PROCEDURE — 700111 HCHG RX REV CODE 636 W/ 250 OVERRIDE (IP)

## 2017-03-21 RX ORDER — DEXAMETHASONE SODIUM PHOSPHATE 4 MG/ML
8 INJECTION, SOLUTION INTRA-ARTICULAR; INTRALESIONAL; INTRAMUSCULAR; INTRAVENOUS; SOFT TISSUE ONCE
Status: COMPLETED | OUTPATIENT
Start: 2017-03-21 | End: 2017-03-21

## 2017-03-21 RX ADMIN — DEXAMETHASONE SODIUM PHOSPHATE 8 MG: 4 INJECTION, SOLUTION INTRAMUSCULAR; INTRAVENOUS at 12:20

## 2017-03-21 RX ADMIN — FLUOROURACIL 3630 MG: 50 INJECTION, SOLUTION INTRAVENOUS at 14:35

## 2017-03-21 RX ADMIN — FLUOROURACIL 605 MG: 50 INJECTION, SOLUTION INTRAVENOUS at 13:31

## 2017-03-21 RX ADMIN — LEUCOVORIN CALCIUM 756 MG: 350 INJECTION, POWDER, LYOPHILIZED, FOR SOLUTION INTRAMUSCULAR; INTRAVENOUS at 12:34

## 2017-03-21 ASSESSMENT — PAIN SCALES - GENERAL: PAINLEVEL: NO PAIN

## 2017-03-21 NOTE — PROGRESS NOTES
"Pharmacy Chemotherapy Calculation:    Patient Name: Karlene Walters      Dx: Colon CA     Cycle:  34([place] cycle 27) Delayed due to neutropenia  Previous treatment: C33 = 2/28/17     Protocol: 5 FU/Leucovorin    Leucovorin 400 mg/m² IV over 2 hours on Day 1, followed by  Fluorouracil 400 mg/m²  IVP on Day 1, followed by   - Dose reduced by 20% to 320 mg/m² starting with Cycle 18 ([place] #11) on 7/5/16 due to neutropenia    Fluorouracil 1200 mg/m²  IV continuous infusion daily on Days 1 and 2 (2400 mg/m² IV over 46-48 hours) -   - Infusion Dose reduced by 20% starting with Cycle 18 ([place] #11) on 7/5/16 due to neutropenia   -20% reduction (1920 mg/m2) to be continued starting C28 per C Alsop APRN   14 day cycles for 12 cycles (adjuvant) or until disease progression or unacceptable toxicity  NCCN Guidelines for Colon Cancer. V.2.2015.  Jay T, et al. Eur J Cancer.1999;35(9):1343-7.          Allergies: Codeine; Oxaliplatin; Pcn; Sulfa drugs; and Tape  /77 mmHg  Pulse 99  Temp(Src) 36.6 °C (97.9 °F)  Resp 18  Ht 1.651 m (5' 5\")  Wt 77.5 kg (170 lb 13.7 oz)  BMI 28.43 kg/m2  SpO2 97% Body surface area is 1.89 meters squared.     3/18/17: ANC~ 2340 Plt = 237k   Hgb = 12.8    3/21/17:   SCr = 1.12 mg/dL CrCl ~ 53 mL/min   LFT's = WNL, except AP = 112 TBili = 0.6  Leucovorin 400 mg/m² x 1.89 m² = 756 mg   <5% difference, OK to treat with final dose = 756 mg IV    Fluorouracil (5-FU) 320 mg/m² x 1.89 m² = 605 mg   <5% difference, OK to treat with final dose = 605 mg IVP    Fluorouracil (5-FU) 1920 mg/m2 x 1.89 m2 = 3629 mg   <5% difference, OK to treat with final dose = 3630 mg CIVI   over 46 hours per home infusion pump at 1.6 ml/hr.     Norris Mitchell PharmD    "

## 2017-03-21 NOTE — PROGRESS NOTES
Chemotherapy Verification - PRIMARY RN      Height = 165.1 cm  Weight = 77.5 kg  BSA = 1.89 m2       Medication: Adrucil  Dose: 1,920 mg/m2 over 46 hours  Calculated Dose: 3,628.8 mg over 46 hours                             (In mg/m2, AUC, mg/kg)     Medication: Adrucil  Dose: 320 mg/m2  Calculated Dose: 604.8 mg                             (In mg/m2, AUC, mg/kg)                            (In mg/m2, AUC, mg/kg)      I confirm this process was performed independently with the BSA and all final chemotherapy dosing calculations congruent.  Any discrepancies of 5% or greater have been addressed with the chemotherapy pharmacist. The resolution of the discrepancy has been documented in the EPIC progress notes.

## 2017-03-21 NOTE — PROGRESS NOTES
"Pharmacy Chemotherapy Verification Note:    Patient Name: Karlene Walters      Dx: Colon CA      Protocol: 5-FU+Leucovorin        *Dosing Reference*  Leucovorin 400 mg/m² IV over 2 hours on Day 1, followed by  Fluorouracil 400 mg/m²  IVP on Day 1, followed by    - Dose reduced by 20% to 320 mg/m² starting with Cycle 18 ([place] #11) on 7/5/16 due to neutropenia    Fluorouracil 1200 mg/m²  IV continuous infusion daily on Days 1 and 2 (2400 mg/m² IV over 46-48 hours) -    - Infusion Dose reduced by 20% starting with Cycle 18 ([place] #11) on 7/5/16 due to neutropenia   -20% reduction (1920 mg/m2) to be continued starting C28 per C Alsop APRN   14 day cycles for 12 cycles (adjuvant) or until disease progression or unacceptable toxicity    NCCN Guidelines for Colon Cancer. V.2.2015.  Jay GROVER, et al. Eur J Cancer.1999;35(9):1343-7.      Allergies:  Codeine; Oxaliplatin; Pcn; Sulfa drugs; and Tape     /77 mmHg  Pulse 99  Temp(Src) 36.6 °C (97.9 °F)  Resp 18  Ht 1.651 m (5' 5\")  Wt 77.5 kg (170 lb 13.7 oz)  BMI 28.43 kg/m2  SpO2 97% Body surface area is 1.89 meters squared.  Labs from 3/18/17:  ANC~ 2340  Plt = 237 k    Labs from today:  SCr = 1.12 mg/dL CrCl = ~52 mL/min  LFT = WNL  TBili = 0.6     Drug Order   (Drug name, dose, route, IV Fluid & volume, frequency, number of doses) Cycle: 34 (delayed for low ANC)      Previous treatment: C33 = 3/14/17     Medication = Leucovorin (Wellcovorin)  Base Dose = 400 mg/m²  Calc Dose: Base Dose x 1.89 m² = 756 mg  Final Dose = 756 mg  Route = IV  Fluid & Volume = D5W 250 mL  Admin Duration = Over 60 minutes   Day 1 only       <5% difference, ok to treat with final written dose   Medication = Fluorouracil (5-FU)   Base Dose = 320 mg/m²  Calc Dose: Base Dose x 1.89 m² = 605 mg  Final Dose = 605 mg  Route = IVP  Fluid & Volume = 12.1 mL in syringe  Conc = 50 mg/mL  Admin Duration = Over 2-5 minutes   Day 1 only        <5% difference, ok to treat with final written dose "   Medication = Fluorouracil (5-FU)   Base Dose = 1920 mg/m²/46 hours  Calc Dose:Base Dose x 1.89 m² = 3629 mg  Final Dose = 3630 mg  Route = CIVI over 46 hours  Fluid & Volume = CADD pump 72.6 mL  Conc = 50 mg/mL  Admin Duration = Over 46 hours  Rate = 1.6 mL/hour          <5% difference, ok to treat with final written dose     By my signature below, I confirm this process was performed independently with the BSA and all final chemotherapy dosing calculations congruent. I have reviewed the above chemotherapy order and that my calculation of the final dose and BSA (when applicable) corroborate those calculations of the  pharmacist.     Rudy Arthur, PharmD, BCPS

## 2017-03-21 NOTE — PROGRESS NOTES
Patient presents for chemotherapy (Adrucil). Reviewed plan of care, informed patient of need for CMP today, patient verbalizes understanding. Port accessed using sterile technique, flushes well with blood drawn as ordered. Reviewed lab results, within parameters to proceed with treatment today. Infusions completed with no new patient complaints, line flushed clear. Patient connected to home infusion pump (see chemotherapy flow sheet). Patient returns Thursday for de-access and released in no acute distress with her .

## 2017-03-21 NOTE — PROGRESS NOTES
Chemotherapy Verification - SECONDARY RN       Height = 165.1 cm  Weight = 77.5 kg  BSA = 1.885 m2       Medication: 5FU  Dose: 320 mg/m2  Calculated Dose: 603.28 mg                             (In mg/m2, AUC, mg/kg)     Medication: Home Infusional 5FU  Dose: 1920 mg/m2  Calculated Dose: 3619.2 mg                             (In mg/m2, AUC, mg/kg)    I confirm that this process was performed independently.

## 2017-03-23 ENCOUNTER — OUTPATIENT INFUSION SERVICES (OUTPATIENT)
Dept: ONCOLOGY | Facility: MEDICAL CENTER | Age: 77
End: 2017-03-23
Attending: INTERNAL MEDICINE
Payer: MEDICARE

## 2017-03-23 VITALS
DIASTOLIC BLOOD PRESSURE: 71 MMHG | HEART RATE: 85 BPM | SYSTOLIC BLOOD PRESSURE: 125 MMHG | OXYGEN SATURATION: 100 % | RESPIRATION RATE: 18 BRPM | TEMPERATURE: 97.4 F

## 2017-03-23 DIAGNOSIS — C20 RECTAL CANCER (HCC): ICD-10-CM

## 2017-03-23 DIAGNOSIS — R94.4 DECREASED GFR: ICD-10-CM

## 2017-03-23 PROCEDURE — 700111 HCHG RX REV CODE 636 W/ 250 OVERRIDE (IP): Performed by: NURSE PRACTITIONER

## 2017-03-23 PROCEDURE — 96523 IRRIG DRUG DELIVERY DEVICE: CPT

## 2017-03-23 RX ADMIN — HEPARIN 500 UNITS: 100 SYRINGE at 13:38

## 2017-03-23 ASSESSMENT — PAIN SCALES - GENERAL: PAINLEVEL: NO PAIN

## 2017-03-23 NOTE — PROGRESS NOTES
"Patient presents ambulatory for 5 FU pump disconnect.  Patient reports feeling \"pretty good\" but having some indigestion.  Patient stated she was going to take some zofran as that \"usually helps\".  Pump reads 0 reisdual volume, and full amount was given.  Pump disconnected, cleaned, and placed in pharmacy.  Patient requested to take bag home and will bring back for next hook up.  Port flushed per protocol, blood return noted, de accessed, gauze, and tape applied to site.  Patient to return 4/4/17 for next appt, confirmed with patient.  Patient left ambulatory in no distress.  "

## 2017-03-28 ENCOUNTER — APPOINTMENT (OUTPATIENT)
Dept: ONCOLOGY | Facility: MEDICAL CENTER | Age: 77
End: 2017-03-28
Attending: INTERNAL MEDICINE
Payer: MEDICARE

## 2017-04-04 ENCOUNTER — OUTPATIENT INFUSION SERVICES (OUTPATIENT)
Dept: ONCOLOGY | Facility: MEDICAL CENTER | Age: 77
End: 2017-04-04
Attending: INTERNAL MEDICINE
Payer: MEDICARE

## 2017-04-04 VITALS
SYSTOLIC BLOOD PRESSURE: 132 MMHG | HEIGHT: 66 IN | TEMPERATURE: 97.5 F | DIASTOLIC BLOOD PRESSURE: 68 MMHG | HEART RATE: 83 BPM | RESPIRATION RATE: 18 BRPM | OXYGEN SATURATION: 99 % | BODY MASS INDEX: 27.28 KG/M2 | WEIGHT: 169.75 LBS

## 2017-04-04 DIAGNOSIS — C18.9 MALIGNANT NEOPLASM OF COLON, UNSPECIFIED PART OF COLON (HCC): ICD-10-CM

## 2017-04-04 DIAGNOSIS — C20 RECTAL CANCER (HCC): ICD-10-CM

## 2017-04-04 LAB
ALBUMIN SERPL BCP-MCNC: 4 G/DL (ref 3.2–4.9)
ALBUMIN/GLOB SERPL: 1.4 G/DL
ALP SERPL-CCNC: 103 U/L (ref 30–99)
ALT SERPL-CCNC: 16 U/L (ref 2–50)
ANION GAP SERPL CALC-SCNC: 8 MMOL/L (ref 0–11.9)
AST SERPL-CCNC: 21 U/L (ref 12–45)
BASOPHILS # BLD AUTO: 1.3 % (ref 0–1.8)
BASOPHILS # BLD: 0.04 K/UL (ref 0–0.12)
BILIRUB SERPL-MCNC: 1.1 MG/DL (ref 0.1–1.5)
BUN SERPL-MCNC: 26 MG/DL (ref 8–22)
CALCIUM SERPL-MCNC: 9.6 MG/DL (ref 8.5–10.5)
CEA SERPL-MCNC: 9.8 NG/ML (ref 0–3)
CHLORIDE SERPL-SCNC: 109 MMOL/L (ref 96–112)
CO2 SERPL-SCNC: 22 MMOL/L (ref 20–33)
CREAT SERPL-MCNC: 1.24 MG/DL (ref 0.5–1.4)
EOSINOPHIL # BLD AUTO: 0.16 K/UL (ref 0–0.51)
EOSINOPHIL NFR BLD: 5.1 % (ref 0–6.9)
ERYTHROCYTE [DISTWIDTH] IN BLOOD BY AUTOMATED COUNT: 61 FL (ref 35.9–50)
GFR SERPL CREATININE-BSD FRML MDRD: 42 ML/MIN/1.73 M 2
GLOBULIN SER CALC-MCNC: 2.9 G/DL (ref 1.9–3.5)
GLUCOSE SERPL-MCNC: 101 MG/DL (ref 65–99)
HCT VFR BLD AUTO: 39.2 % (ref 37–47)
HGB BLD-MCNC: 13.1 G/DL (ref 12–16)
IMM GRANULOCYTES # BLD AUTO: 0.01 K/UL (ref 0–0.11)
IMM GRANULOCYTES NFR BLD AUTO: 0.3 % (ref 0–0.9)
LYMPHOCYTES # BLD AUTO: 1.09 K/UL (ref 1–4.8)
LYMPHOCYTES NFR BLD: 34.8 % (ref 22–41)
MCH RBC QN AUTO: 32.7 PG (ref 27–33)
MCHC RBC AUTO-ENTMCNC: 33.4 G/DL (ref 33.6–35)
MCV RBC AUTO: 97.8 FL (ref 81.4–97.8)
MONOCYTES # BLD AUTO: 0.46 K/UL (ref 0–0.85)
MONOCYTES NFR BLD AUTO: 14.7 % (ref 0–13.4)
NEUTROPHILS # BLD AUTO: 1.37 K/UL (ref 2–7.15)
NEUTROPHILS NFR BLD: 43.8 % (ref 44–72)
NRBC # BLD AUTO: 0 K/UL
NRBC BLD AUTO-RTO: 0 /100 WBC
PLATELET # BLD AUTO: 167 K/UL (ref 164–446)
PMV BLD AUTO: 9.7 FL (ref 9–12.9)
POTASSIUM SERPL-SCNC: 3.8 MMOL/L (ref 3.6–5.5)
PROT SERPL-MCNC: 6.9 G/DL (ref 6–8.2)
RBC # BLD AUTO: 4.01 M/UL (ref 4.2–5.4)
SODIUM SERPL-SCNC: 139 MMOL/L (ref 135–145)
WBC # BLD AUTO: 3.1 K/UL (ref 4.8–10.8)

## 2017-04-04 PROCEDURE — A4212 NON CORING NEEDLE OR STYLET: HCPCS

## 2017-04-04 PROCEDURE — 85025 COMPLETE CBC W/AUTO DIFF WBC: CPT

## 2017-04-04 PROCEDURE — 700111 HCHG RX REV CODE 636 W/ 250 OVERRIDE (IP): Performed by: NURSE PRACTITIONER

## 2017-04-04 PROCEDURE — G0498 CHEMO EXTEND IV INFUS W/PUMP: HCPCS

## 2017-04-04 PROCEDURE — 96367 TX/PROPH/DG ADDL SEQ IV INF: CPT

## 2017-04-04 PROCEDURE — 96375 TX/PRO/DX INJ NEW DRUG ADDON: CPT

## 2017-04-04 PROCEDURE — 82378 CARCINOEMBRYONIC ANTIGEN: CPT

## 2017-04-04 PROCEDURE — 80053 COMPREHEN METABOLIC PANEL: CPT

## 2017-04-04 PROCEDURE — 96376 TX/PRO/DX INJ SAME DRUG ADON: CPT

## 2017-04-04 PROCEDURE — 96368 THER/DIAG CONCURRENT INF: CPT

## 2017-04-04 PROCEDURE — 700111 HCHG RX REV CODE 636 W/ 250 OVERRIDE (IP)

## 2017-04-04 PROCEDURE — 96409 CHEMO IV PUSH SNGL DRUG: CPT

## 2017-04-04 RX ORDER — DEXAMETHASONE SODIUM PHOSPHATE 4 MG/ML
8 INJECTION, SOLUTION INTRA-ARTICULAR; INTRALESIONAL; INTRAMUSCULAR; INTRAVENOUS; SOFT TISSUE ONCE
Status: COMPLETED | OUTPATIENT
Start: 2017-04-04 | End: 2017-04-04

## 2017-04-04 RX ADMIN — FLUOROURACIL 3630 MG: 50 INJECTION, SOLUTION INTRAVENOUS at 11:32

## 2017-04-04 RX ADMIN — DEXAMETHASONE SODIUM PHOSPHATE 8 MG: 4 INJECTION, SOLUTION INTRAMUSCULAR; INTRAVENOUS at 09:00

## 2017-04-04 RX ADMIN — LEUCOVORIN CALCIUM 756 MG: 350 INJECTION, POWDER, LYOPHILIZED, FOR SOLUTION INTRAMUSCULAR; INTRAVENOUS at 09:25

## 2017-04-04 RX ADMIN — FLUOROURACIL 605 MG: 50 INJECTION, SOLUTION INTRAVENOUS at 10:27

## 2017-04-04 ASSESSMENT — PAIN SCALES - GENERAL: PAINLEVEL: NO PAIN

## 2017-04-04 NOTE — PROGRESS NOTES
"Pharmacy Chemotherapy Verification Note:    Patient Name: Karlene Walters      Dx: Colon CA      Protocol: 5-FU+Leucovorin      *Dosing Reference*  Leucovorin 400 mg/m² IV over 2 hours on Day 1, followed by  Fluorouracil 400 mg/m²  IVP on Day 1, followed by    - Dose reduced by 20% to 320 mg/m² starting with Cycle 18 ([place] #11) on 7/5/16 due to neutropenia    Fluorouracil 1200 mg/m²  IV continuous infusion daily on Days 1 and 2 (2400 mg/m² IV over 46-48 hours) -    - Infusion Dose reduced by 20% starting with Cycle 18 ([place] #11) on 7/5/16 due to neutropenia   -20% reduction (1920 mg/m2) to be continued starting C28 per C Alsop APRN   14 day cycles for 12 cycles (adjuvant) or until disease progression or unacceptable toxicity  NCCN Guidelines for Colon Cancer. V.2.2015.  Jay T, et al. Eur J Cancer.1999;35(9):1343-7.      Allergies:  Codeine; Oxaliplatin; Pcn; Sulfa drugs; and Tape     /68 mmHg  Pulse 83  Temp(Src) 36.4 °C (97.5 °F)  Resp 18  Ht 1.676 m (5' 6\")  Wt 77 kg (169 lb 12.1 oz)  BMI 27.41 kg/m2  SpO2 99% Body surface area is 1.89 meters squared.    Labs from 4/4/17:  ANC~ 1370  Plt = 167 k Hgb = 13.1  Labs from 4/4/17:  SCr = 1.24 mg/dL CrCl = ~47 mL/min  LFT = WNL except AP = 103  TBili = 1.1  Cyndee LUCERO aware of current labs, orders received to treat as ordered, no renal adjustment recommended.      Drug Order   (Drug name, dose, route, IV Fluid & volume, frequency, number of doses) Cycle: 35   Previous treatment: C34 = 3/21/17     Medication = Leucovorin (Wellcovorin)  Base Dose = 400 mg/m²  Calc Dose: Base Dose x 1.89 m² = 756 mg  Final Dose = 756 mg  Route = IV  Fluid & Volume = D5W 250 mL  Admin Duration = Over 60 minutes         <5% difference, ok to treat with final dose   Medication = Fluorouracil (5-FU)   Base Dose = 320 mg/m²  Calc Dose: Base Dose x 1.89 m² = 605 mg  Final Dose = 605 mg  Route = IVP  Fluid & Volume = 12.1 mL in syringe  Conc = 50 mg/mL  Admin Duration = Over " 2-5 minutes         <5% difference, ok to treat with final dose   Medication = Fluorouracil (5-FU)   Base Dose = 1920 mg/m²/46 hours  Calc Dose:Base Dose x 1.89 m² = 3629 mg  Final Dose = 3630 mg  Route = CIVI over 46 hours  Fluid & Volume = CADD pump 72.6 mL  Conc = 50 mg/mL  Admin Duration = Over 46 hours  Rate = 1.6 mL/hour          <5% difference, ok to treat with final dose     By my signature below, I confirm this process was performed independently with the BSA and all final chemotherapy dosing calculations congruent. I have reviewed the above chemotherapy order and that my calculation of the final dose and BSA (when applicable) corroborate those calculations of the  pharmacist.     Norris Mitchell, PharmD

## 2017-04-04 NOTE — PROGRESS NOTES
Patient presents for day one cycle twenty eight Adrucil chemotherapy. Reviewed plan of care, questions answered as needed. Port accessed using sterile technique, blood drawn from line. Call placed Dr. Cotter, obtained okay to treat with ANC of 1370. Infusions completed with no new patient complaints, line flushed clear. Patient connected to home chemotherapy infusion pump. Patient scheduled for next appointment and released in no acute distress with her .

## 2017-04-04 NOTE — PROGRESS NOTES
Chemotherapy Verification - SECONDARY RN       Height = 167.6 cm  Weight = 77 kg  BSA = 1.89 m2       Medication: Fluorouracil continuous  Dose: 1920 mg.m2  Calculated Dose: 3628.8 mg                             (In mg/m2, AUC, mg/kg)     Medication: Fluorouracil  Dose: 320 mg/m2  Calculated Dose: 604.8 mg                             (In mg/m2, AUC, mg/kg)      I confirm that this process was performed independently.

## 2017-04-04 NOTE — PROGRESS NOTES
"Pharmacy Chemotherapy Calculation:    Patient Name: Karlene Walters      Dx: Colon CA     Cycle:  35 ([place] cycle 28)  Previous treatment: 3/21/17     Protocol: 5 FU/Leucovorin    Leucovorin 400 mg/m² IV over 2 hours on Day 1, followed by  Fluorouracil 400 mg/m²  IVP on Day 1, followed by   - Dose reduced by 20% to 320 mg/m² starting with Cycle 18 ([place] #11) on 7/5/16 due to neutropenia    Fluorouracil 1200 mg/m²  IV continuous infusion daily on Days 1 and 2 (2400 mg/m² IV over 46-48 hours) -   - Infusion Dose reduced by 20% starting with Cycle 18 ([place] #11) on 7/5/16 due to neutropenia   -20% reduction (1920 mg/m2) to be continued starting C28 per C Alsop APRN   14 day cycles for 12 cycles (adjuvant) or until disease progression or unacceptable toxicity  NCCN Guidelines for Colon Cancer. V.2.2015.  Jay T, et al. Eur J Cancer.1999;35(9):1343-7.          Allergies: Codeine; Oxaliplatin; Pcn; Sulfa drugs; and Tape  /68 mmHg  Pulse 83  Temp(Src) 36.4 °C (97.5 °F)  Resp 18  Ht 1.676 m (5' 6\")  Wt 77 kg (169 lb 12.1 oz)  BMI 27.41 kg/m2  SpO2 99% Body surface area is 1.89 meters squared.     ANC~1370--okay to treat per MD Plt = 167k   Hgb = 13.1     SCr = 1.24 mg/dL CrCl ~47 mL/min   LFT's = WNL, except   TBili = 1.1     Leucovorin 400 mg/m² x 1.89 m² = 756 mg   <5% difference, OK to treat with final dose = 756 mg IV    Fluorouracil (5-FU) 320 mg/m² x 1.89 m² = 605 mg   <5% difference, OK to treat with final dose = 605 mg IVP    Fluorouracil (5-FU) 1920 mg/m2 x 1.89 m2 = 3629 mg   <5% difference, OK to treat with final dose = 3630 mg CIVI over 46 hours       Tiffanie Scanlon, PharmD       "

## 2017-04-04 NOTE — PROGRESS NOTES
Chemotherapy Verification - PRIMARY RN      Height = 167.6 cm  Weight = 77 kg  BSA = 1.89 m2      Medication: Adrucil  Dose: 320 mg/m2  Calculated Dose: 604.8 mg                             (In mg/m2, AUC, mg/kg)     Medication: Adrucil  Dose: 1920 mg/m2  Calculated Dose: 3628.8 mg                             (In mg/m2, AUC, mg/kg)        I confirm this process was performed independently with the BSA and all final chemotherapy dosing calculations congruent.  Any discrepancies of 5% or greater have been addressed with the chemotherapy pharmacist. The resolution of the discrepancy has been documented in the EPIC progress notes.

## 2017-04-05 ENCOUNTER — OUTPATIENT INFUSION SERVICES (OUTPATIENT)
Dept: ONCOLOGY | Facility: MEDICAL CENTER | Age: 77
End: 2017-04-05
Attending: INTERNAL MEDICINE
Payer: MEDICARE

## 2017-04-05 VITALS
DIASTOLIC BLOOD PRESSURE: 70 MMHG | WEIGHT: 170.19 LBS | BODY MASS INDEX: 27.35 KG/M2 | RESPIRATION RATE: 18 BRPM | HEIGHT: 66 IN | HEART RATE: 85 BPM | OXYGEN SATURATION: 100 % | SYSTOLIC BLOOD PRESSURE: 153 MMHG | TEMPERATURE: 97.2 F

## 2017-04-05 PROCEDURE — 96523 IRRIG DRUG DELIVERY DEVICE: CPT

## 2017-04-05 ASSESSMENT — PAIN SCALES - GENERAL: PAINLEVEL: NO PAIN

## 2017-04-05 NOTE — PROGRESS NOTES
"Pt arrived to IS, ambulatory, for port care. Pt's home infusion pump was alerting \"high pressure\". Pt called pump assistance line and was instructed to come back to infusion because \"something is wrong with the port.\" Pump was turned off when patient arrived. Port attempted to be flushed, unable to flush port. Port needle repositioned, port flushed, positive blood return noted. CADD pump restarted. Pt left IS with no s/sx of distress. Follow up appointment confirmed for pump de-access.  "

## 2017-04-06 ENCOUNTER — OUTPATIENT INFUSION SERVICES (OUTPATIENT)
Dept: ONCOLOGY | Facility: MEDICAL CENTER | Age: 77
End: 2017-04-06
Attending: INTERNAL MEDICINE
Payer: MEDICARE

## 2017-04-06 VITALS
BODY MASS INDEX: 27.46 KG/M2 | RESPIRATION RATE: 18 BRPM | SYSTOLIC BLOOD PRESSURE: 131 MMHG | WEIGHT: 170.86 LBS | OXYGEN SATURATION: 97 % | TEMPERATURE: 97.2 F | HEART RATE: 92 BPM | HEIGHT: 66 IN | DIASTOLIC BLOOD PRESSURE: 58 MMHG

## 2017-04-06 DIAGNOSIS — C20 RECTAL CANCER (HCC): ICD-10-CM

## 2017-04-06 PROCEDURE — 700111 HCHG RX REV CODE 636 W/ 250 OVERRIDE (IP)

## 2017-04-06 PROCEDURE — 96523 IRRIG DRUG DELIVERY DEVICE: CPT

## 2017-04-06 RX ADMIN — HEPARIN 500 UNITS: 100 SYRINGE at 16:44

## 2017-04-06 ASSESSMENT — PAIN SCALES - GENERAL: PAINLEVEL: NO PAIN

## 2017-04-06 NOTE — PROGRESS NOTES
"Patient returns to Infusion, reports pump alarming \"high pressure\".  Pump is in stopped position. Site and port evaluated.  Unable to flush and/or aspirate, site wnl.  Pump turned off and tubing locked.  Port de-accessed and re-accessed with a new hernandez needle using sterile technique.  Port now with brisk blood return. Pump reconnected and started.  Reserve volume @35ml running at 1.6ml/h.  Anticipate finishing tomorrow afternoon.  Patient instructed to return to the infusion center after completion, verbalized understanding.   "

## 2017-04-07 NOTE — PROGRESS NOTES
Pt arrived to IS, ambulatory, for pump de-access. Pt voices no new complaints. Pump stopped when patient arrived, residual volume 0 mL. Pump shut off, cassette removed from pump and properly disposed of. Port flushed and heparin locked per policy, port de-accessed. Pump returned to pharmacy. Pt left IS with no s/sx of distress. Follow up appointment confirmed.

## 2017-04-17 ENCOUNTER — TELEPHONE (OUTPATIENT)
Dept: HEMATOLOGY ONCOLOGY | Facility: MEDICAL CENTER | Age: 77
End: 2017-04-17

## 2017-04-18 ENCOUNTER — OUTPATIENT INFUSION SERVICES (OUTPATIENT)
Dept: ONCOLOGY | Facility: MEDICAL CENTER | Age: 77
End: 2017-04-18
Attending: INTERNAL MEDICINE
Payer: MEDICARE

## 2017-04-18 ENCOUNTER — OFFICE VISIT (OUTPATIENT)
Dept: HEMATOLOGY ONCOLOGY | Facility: MEDICAL CENTER | Age: 77
End: 2017-04-18
Payer: MEDICARE

## 2017-04-18 VITALS
TEMPERATURE: 97.4 F | BODY MASS INDEX: 28.39 KG/M2 | DIASTOLIC BLOOD PRESSURE: 71 MMHG | HEIGHT: 65 IN | RESPIRATION RATE: 18 BRPM | SYSTOLIC BLOOD PRESSURE: 129 MMHG | HEART RATE: 86 BPM | WEIGHT: 170.42 LBS | OXYGEN SATURATION: 98 %

## 2017-04-18 VITALS
WEIGHT: 170.42 LBS | OXYGEN SATURATION: 98 % | RESPIRATION RATE: 18 BRPM | HEIGHT: 65 IN | HEART RATE: 86 BPM | BODY MASS INDEX: 28.39 KG/M2 | SYSTOLIC BLOOD PRESSURE: 129 MMHG | DIASTOLIC BLOOD PRESSURE: 71 MMHG | TEMPERATURE: 97.4 F

## 2017-04-18 VITALS
OXYGEN SATURATION: 88 % | BODY MASS INDEX: 28.32 KG/M2 | RESPIRATION RATE: 16 BRPM | HEIGHT: 65 IN | DIASTOLIC BLOOD PRESSURE: 76 MMHG | HEART RATE: 98 BPM | SYSTOLIC BLOOD PRESSURE: 118 MMHG | WEIGHT: 169.97 LBS | TEMPERATURE: 97.9 F

## 2017-04-18 DIAGNOSIS — C78.7 SECONDARY MALIGNANT NEOPLASM OF LIVER (HCC): ICD-10-CM

## 2017-04-18 DIAGNOSIS — R94.4 DECREASED GFR: ICD-10-CM

## 2017-04-18 DIAGNOSIS — C20 RECTAL CANCER (HCC): ICD-10-CM

## 2017-04-18 DIAGNOSIS — M25.531 RIGHT WRIST PAIN: ICD-10-CM

## 2017-04-18 DIAGNOSIS — C18.9 MALIGNANT NEOPLASM OF COLON, UNSPECIFIED PART OF COLON (HCC): ICD-10-CM

## 2017-04-18 LAB
ALBUMIN SERPL BCP-MCNC: 3.6 G/DL (ref 3.2–4.9)
ALBUMIN/GLOB SERPL: 1.6 G/DL
ALP SERPL-CCNC: 95 U/L (ref 30–99)
ALT SERPL-CCNC: 14 U/L (ref 2–50)
ANION GAP SERPL CALC-SCNC: 13 MMOL/L (ref 0–11.9)
AST SERPL-CCNC: 19 U/L (ref 12–45)
BASOPHILS # BLD AUTO: 1.3 % (ref 0–1.8)
BASOPHILS # BLD: 0.04 K/UL (ref 0–0.12)
BILIRUB SERPL-MCNC: 1 MG/DL (ref 0.1–1.5)
BUN SERPL-MCNC: 16 MG/DL (ref 8–22)
CALCIUM SERPL-MCNC: 8.8 MG/DL (ref 8.5–10.5)
CHLORIDE SERPL-SCNC: 109 MMOL/L (ref 96–112)
CO2 SERPL-SCNC: 21 MMOL/L (ref 20–33)
CREAT SERPL-MCNC: 0.9 MG/DL (ref 0.5–1.4)
EOSINOPHIL # BLD AUTO: 0.15 K/UL (ref 0–0.51)
EOSINOPHIL NFR BLD: 5 % (ref 0–6.9)
ERYTHROCYTE [DISTWIDTH] IN BLOOD BY AUTOMATED COUNT: 56.4 FL (ref 35.9–50)
GFR SERPL CREATININE-BSD FRML MDRD: >60 ML/MIN/1.73 M 2
GLOBULIN SER CALC-MCNC: 2.2 G/DL (ref 1.9–3.5)
GLUCOSE SERPL-MCNC: 103 MG/DL (ref 65–99)
HCT VFR BLD AUTO: 37.3 % (ref 37–47)
HGB BLD-MCNC: 12.7 G/DL (ref 12–16)
IMM GRANULOCYTES # BLD AUTO: 0.01 K/UL (ref 0–0.11)
IMM GRANULOCYTES NFR BLD AUTO: 0.3 % (ref 0–0.9)
LYMPHOCYTES # BLD AUTO: 1.12 K/UL (ref 1–4.8)
LYMPHOCYTES NFR BLD: 37.2 % (ref 22–41)
MCH RBC QN AUTO: 33.6 PG (ref 27–33)
MCHC RBC AUTO-ENTMCNC: 34 G/DL (ref 33.6–35)
MCV RBC AUTO: 98.7 FL (ref 81.4–97.8)
MONOCYTES # BLD AUTO: 0.58 K/UL (ref 0–0.85)
MONOCYTES NFR BLD AUTO: 19.3 % (ref 0–13.4)
NEUTROPHILS # BLD AUTO: 1.11 K/UL (ref 2–7.15)
NEUTROPHILS NFR BLD: 36.9 % (ref 44–72)
NRBC # BLD AUTO: 0 K/UL
NRBC BLD AUTO-RTO: 0 /100 WBC
PLATELET # BLD AUTO: 138 K/UL (ref 164–446)
PMV BLD AUTO: 9.9 FL (ref 9–12.9)
POTASSIUM SERPL-SCNC: 3.2 MMOL/L (ref 3.6–5.5)
PROT SERPL-MCNC: 5.8 G/DL (ref 6–8.2)
RBC # BLD AUTO: 3.78 M/UL (ref 4.2–5.4)
SODIUM SERPL-SCNC: 143 MMOL/L (ref 135–145)
WBC # BLD AUTO: 3 K/UL (ref 4.8–10.8)

## 2017-04-18 PROCEDURE — G8432 DEP SCR NOT DOC, RNG: HCPCS | Performed by: NURSE PRACTITIONER

## 2017-04-18 PROCEDURE — 85025 COMPLETE CBC W/AUTO DIFF WBC: CPT

## 2017-04-18 PROCEDURE — 80053 COMPREHEN METABOLIC PANEL: CPT

## 2017-04-18 PROCEDURE — 96409 CHEMO IV PUSH SNGL DRUG: CPT

## 2017-04-18 PROCEDURE — 1036F TOBACCO NON-USER: CPT | Performed by: NURSE PRACTITIONER

## 2017-04-18 PROCEDURE — G8419 CALC BMI OUT NRM PARAM NOF/U: HCPCS | Performed by: NURSE PRACTITIONER

## 2017-04-18 PROCEDURE — A4212 NON CORING NEEDLE OR STYLET: HCPCS

## 2017-04-18 PROCEDURE — 4040F PNEUMOC VAC/ADMIN/RCVD: CPT | Performed by: NURSE PRACTITIONER

## 2017-04-18 PROCEDURE — 1101F PT FALLS ASSESS-DOCD LE1/YR: CPT | Performed by: NURSE PRACTITIONER

## 2017-04-18 PROCEDURE — A9270 NON-COVERED ITEM OR SERVICE: HCPCS | Performed by: NURSE PRACTITIONER

## 2017-04-18 PROCEDURE — 96375 TX/PRO/DX INJ NEW DRUG ADDON: CPT

## 2017-04-18 PROCEDURE — 96366 THER/PROPH/DIAG IV INF ADDON: CPT

## 2017-04-18 PROCEDURE — 99213 OFFICE O/P EST LOW 20 MIN: CPT | Performed by: NURSE PRACTITIONER

## 2017-04-18 PROCEDURE — G0498 CHEMO EXTEND IV INFUS W/PUMP: HCPCS

## 2017-04-18 PROCEDURE — 700102 HCHG RX REV CODE 250 W/ 637 OVERRIDE(OP): Performed by: NURSE PRACTITIONER

## 2017-04-18 PROCEDURE — 700111 HCHG RX REV CODE 636 W/ 250 OVERRIDE (IP): Performed by: NURSE PRACTITIONER

## 2017-04-18 PROCEDURE — 700111 HCHG RX REV CODE 636 W/ 250 OVERRIDE (IP)

## 2017-04-18 RX ORDER — POTASSIUM CHLORIDE 20 MEQ/1
40 TABLET, EXTENDED RELEASE ORAL ONCE
Status: COMPLETED | OUTPATIENT
Start: 2017-04-18 | End: 2017-04-18

## 2017-04-18 RX ORDER — DEXAMETHASONE SODIUM PHOSPHATE 4 MG/ML
8 INJECTION, SOLUTION INTRA-ARTICULAR; INTRALESIONAL; INTRAMUSCULAR; INTRAVENOUS; SOFT TISSUE ONCE
Status: COMPLETED | OUTPATIENT
Start: 2017-04-18 | End: 2017-04-18

## 2017-04-18 RX ORDER — POTASSIUM CHLORIDE 750 MG/1
40 TABLET, FILM COATED, EXTENDED RELEASE ORAL DAILY
Status: DISCONTINUED | OUTPATIENT
Start: 2017-04-18 | End: 2017-04-18

## 2017-04-18 RX ADMIN — FLUOROURACIL 600 MG: 50 INJECTION, SOLUTION INTRAVENOUS at 11:58

## 2017-04-18 RX ADMIN — DEXAMETHASONE SODIUM PHOSPHATE 8 MG: 4 INJECTION, SOLUTION INTRAMUSCULAR; INTRAVENOUS at 10:27

## 2017-04-18 RX ADMIN — LEUCOVORIN CALCIUM 752 MG: 350 INJECTION, POWDER, LYOPHILIZED, FOR SOLUTION INTRAMUSCULAR; INTRAVENOUS at 10:58

## 2017-04-18 RX ADMIN — HEPARIN 500 UNITS: 100 SYRINGE at 08:16

## 2017-04-18 RX ADMIN — FLUOROURACIL 3610 MG: 50 INJECTION, SOLUTION INTRAVENOUS at 13:22

## 2017-04-18 RX ADMIN — POTASSIUM CHLORIDE 40 MEQ: 1500 TABLET, EXTENDED RELEASE ORAL at 11:21

## 2017-04-18 ASSESSMENT — ENCOUNTER SYMPTOMS
NAUSEA: 0
DIARRHEA: 0
COUGH: 0
WHEEZING: 0
VOMITING: 0
PALPITATIONS: 0
TINGLING: 0
WEIGHT LOSS: 0
CHILLS: 0
HEADACHES: 0
CONSTIPATION: 0
SHORTNESS OF BREATH: 1
FEVER: 0
DIZZINESS: 0

## 2017-04-18 ASSESSMENT — PAIN SCALES - GENERAL
PAINLEVEL: 3=SLIGHT PAIN

## 2017-04-18 NOTE — PROGRESS NOTES
"Chemotherapy Verification - PRIMARY RN      Height = 65\"  Weight = 77.3 kg  BSA = 1.88 m2       Medication: 5 FU bolus  Dose: 320 mg/m2  Calculated Dose: 601.3 mg                          Medication: 5 FU home pump  Dose: 1920 mg/m2  Calculated Dose: 3609.6 mg                             I confirm this process was performed independently with the BSA and all final chemotherapy dosing calculations congruent.  Any discrepancies of 5% or greater have been addressed with the chemotherapy pharmacist. The resolution of the discrepancy has been documented in the EPIC progress notes.       "

## 2017-04-18 NOTE — PROGRESS NOTES
Potassium oral replacement given. 5FU bolus given. Leucovorin infusion completed and blood return verified prior to pump connection. 5FU pump connected and appt for disconnect verified for pt. Pt left ambulatory with spouse in no distress at 1335.

## 2017-04-18 NOTE — MR AVS SNAPSHOT
"        Karlenetrish Baughjose g Walters   2017 9:00 AM   Office Visit   MRN: [id number]    Department:  [facility]   Dept Phone:  [phone]    Description:  Female : 1940   Provider:  Ella De A.P.N.           Reason for Visit     Follow-Up           Allergies as of 2017     Allergen Noted Reactions    Codeine 2011       \"gets drunk\"    Oxaliplatin 10/27/2015   Anaphylaxis    Pcn [Penicillins] 2011   Itching    Sulfa Drugs 2011   Itching    Tape 2013   Rash    PAPER TAPE OK      You were diagnosed with     Rectal cancer (CMS-HCC)   [124497]       Secondary malignant neoplasm of liver (CMS-HCC)   [197.7.ICD-9-CM]       Right wrist pain   [985636]         Vital Signs     Blood Pressure Pulse Temperature Respirations Height Weight    118/76 mmHg 98 36.6 °C (97.9 °F) 16 1.651 m (5' 5\") 77.1 kg (169 lb 15.6 oz)    Body Mass Index Oxygen Saturation Smoking Status             28.29 kg/m2 88% Never Smoker          Basic Information     Date Of Birth Sex Race Ethnicity Preferred Language    1940 Female White Non- English      Your appointments     2017 10:00 AM   Est Chemo 2 with RN 1   In[facility] ([facility])    [address] [place] [address]   [phone]            2017 11:00 AM   EST Port Flush / Central Line Care with INFUSION QUICK INJECT   [facility] ([facility])    [address] [address]   [phone]            May 02, 2017  8:30 AM   Lab Draw with INFUSION QUICK INJECT   [facility] ([facility])    [address] [address]   [phone]            May 02, 2017 10:30 AM   Est Chemo 2 with RN 6   [facility] ([facility])    [address] [address]   [phone]            May 04, 2017 11:30 AM   EST Port Flush / Central Line Care with RN 2   [facility] ([facility])    [address] [address]   [phone]            " 2017  8:20 AM   ONCOLOGY NEW PATIENT 60 MIN with Eric Hardy M.D.   [facility] (--)    [address], [address]  [place] [address]   [phone]            May 12, 2017 11:00 AM   ANNUAL EXAM PREVENTATIVE with Manan Quiñonez M.D.   [facility] [address]    [address]  [place] [address]   [phone]            May 23, 2017 10:00 AM   ONCOLOGY EST PATIENT 30 MIN with ARTURO Arteaga   [facility] (--)    [address], [address]  [place] [address]   [phone]            Jun 06, 2017 10:00 AM   ONCOLOGY EST PATIENT 30 MIN with Eric Hardy M.D.   [facility] (--)    [address], [address]  [place] [address]   [phone]            Jul 19, 2017 10:30 AM   FOLLOW UP with Ahsan Haque M.D.   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   2/27/2013 - Present    Digestive-genital tract fistula, 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   7/17/2013 - Present    Abnormal EKG R94.31 High  8/12/2013 - Present    Hemorrhage of rectum and anus K62.5   10/23/2013 - Present    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   10/15/2014 - Present    Thyroid nodule E04.1   12/19/2014 - Present    Secondary malignant neoplasm of liver (CMS-HCC) C78.7   7/6/2015 - Present    Dehydration E86.0   8/14/2015 - Present    Nausea & vomiting R11.2 Medium  8/15/2015 - Present    Colon cancer (CMS-HCC) C18.9   8/16/2015 - Present    Pulmonary embolism (CMS-HCC) I26.99 High  8/18/2015 - Present    Ileitis K52.9    8/24/2015 - Present    Diarrhea R19.7   8/24/2015 - Present    Hyperlipidemia E78.5   9/17/2015 - Present    Lightheadedness R42   9/17/2015 - Present    Shortness of breath R06.02 High  10/5/2015 - Present    Routine general medical examination at a health care facility Z00.00   3/14/2016 - Present    Renal insufficiency N28.9   3/14/2016 - Present    Hyperglycemia R73.9   3/14/2016 - Present    Decreased GFR R94.4   8/16/2016 - Present    Chemotherapy-induced neutropenia (CMS-HCC) D70.1   9/28/2016 - Present      Health Maintenance        Date Due Completion Dates    IMM DTaP/Tdap/Td Vaccine (1 - Tdap) 4/13/1959 ---    BONE DENSITY 4/13/2005 ---    IMM PNEUMOCOCCAL 65+ (ADULT) LOW/MEDIUM RISK SERIES (2 of 2 - PCV13) 9/5/2007 9/5/2006    COLONOSCOPY 8/23/2025 8/23/2015, 9/20/2012            Current Immunizations     Influenza TIV (IM) 9/5/2013  6:12 AM, 10/12/2012  6:09 PM    Influenza Vaccine Quad Inj (Preserved) 10/18/2014    Pneumococcal polysaccharide vaccine (PPSV-23) 9/5/2006    SHINGLES VACCINE 1/14/2015      Below and/or attached are the medications your provider expects you to take. Review all of your home medications and newly ordered medications with your provider and/or pharmacist. Follow medication instructions as directed by your provider and/or pharmacist. Please keep your medication list with you and share with your provider. Update the information when medications are discontinued, doses are changed, or new medications (including over-the-counter products) are added; and carry medication information at all times in the event of emergency situations     Allergies:  CODEINE - (reactions not documented)     OXALIPLATIN - Anaphylaxis     PCN - Itching     SULFA DRUGS - Itching     TAPE - Rash               Medications  Valid as of: April 18, 2017 -  9:44 AM    Generic Name Brand Name Tablet Size Instructions for use    Cholecalciferol (Cap) Vitamin D3 400 UNITS Take 1 Cap by mouth every day.         Cyanocobalamin (Tab) VITAMIN B-12 500 MCG Take 500 mcg by mouth every day.        Famotidine (Tab) PEPCID 40 MG Take 1 Tab by mouth every day.        Furosemide (Tab) LASIX 20 MG Take 1 Tab by mouth every day. PRN EDEMA        Ibuprofen (Tab) MOTRIN 200 MG Take 200 mg by mouth every 6 hours as needed.        Lidocaine-Prilocaine (Cream) EMLA 2.5-2.5 % APPLY A THICK FILM OVER THE PORT ACCESS SITE PRIOR TO ACCESS        Loperamide HCl (Liquid) IMODIUM 1 MG/5ML Take 2 mg by mouth 4 times a day as needed for Diarrhea.        Loratadine (Tab) CLARITIN 10 MG Take 10 mg by mouth every day. Indications: Hayfever        Multiple Vitamins-Minerals   Take  by mouth.        Ondansetron HCl (Tab) ZOFRAN 4 MG         OxyCODONE HCl (Tab) ROXICODONE 5 MG Take 5 mg by mouth every four hours as needed for Severe Pain.        Potassium Chloride (Tab CR) KLOR-CON 10 MEQ Take 1 Tab by mouth every day.        RaNITidine HCl (Tab) ZANTAC 150 MG Take 1 Tab by mouth 2 Times a Day.        Rivaroxaban (Tab) XARELTO 20 MG Take 1 Tab by mouth with dinner.        .                 Medicines prescribed today were sent to:     [facility] [place], [place] - [address] [place] [address]    [address] [facility] [address]    Phone: [phone] Fax: [phone]    Open 24 Hours?: No      Medication refill instructions:       If your prescription bottle indicates you have medication refills left, it is not necessary to call your provider’s office. Please contact your pharmacy and they will refill your medication.    If your prescription bottle indicates you do not have any refills left, you may request refills at any time through one of the following ways: The online [employer] system (except Urgent Care), by calling your provider’s office, or by asking your pharmacy to contact your provider’s office with a refill request. Medication refills are processed only during regular business hours and may not be available until the next  business day. Your provider may request additional information or to have a follow-up visit with you prior to refilling your medication.   *Please Note: Medication refills are assigned a new Rx number when refilled electronically. Your pharmacy may indicate that no refills were authorized even though a new prescription for the same medication is available at the pharmacy. Please request the medicine by name with the pharmacy before contacting your provider for a refill.           [employer] Access Code: [email or web address]  Expires: 5/10/2017  1:05 PM    [employer]  A secure, online tool to manage your health information     [employer]’s [employer]® is a secure, online tool that connects you to your personalized health information from the privacy of your home -- day or night - making it very easy for you to manage your healthcare. Once the activation process is completed, you can even access your medical information using the [employer] bart, which is available for free in the Apple Bart store or Google Play store.     [employer] provides the following levels of access (as shown below):   My Chart Features   [facility] Doctor [facility]  Specialists [facility]  Urgent  Care [facility]  Primary Care  Doctor   Email your healthcare team securely and privately 24/7 X X X    Manage appointments: schedule your next appointment; view details of past/upcoming appointments X      Request prescription refills. X      View recent personal medical records, including lab and immunizations X X X X   View health record, including health history, allergies, medications X X X X   Read reports about your outpatient visits, procedures, consult and ER notes X X X X   See your discharge summary, which is a recap of your hospital and/or ER visit that includes your diagnosis, lab results, and care plan. X X       How to register for [employer]:  1. Go to  [email or web address].  2. Click on the Sign Up Now box, which takes you to the New Member Sign  Up page. You will need to provide the following information:  a. Enter your [employer] Access Code exactly as it appears at the top of this page. (You will not need to use this code after you’ve completed the sign-up process. If you do not sign up before the expiration date, you must request a new code.)   b. Enter your date of birth.   c. Enter your home email address.   d. Click Submit, and follow the next screen’s instructions.  3. Create a [employer] ID. This will be your [employer] login ID and cannot be changed, so think of one that is secure and easy to remember.  4. Create a [employer] password. You can change your password at any time.  5. Enter your Password Reset Question and Answer. This can be used at a later time if you forget your password.   6. Enter your e-mail address. This allows you to receive e-mail notifications when new information is available in [employer].  7. Click Sign Up. You can now view your health information.    For assistance activating your [employer] account, call [phone]

## 2017-04-18 NOTE — PROGRESS NOTES
"Pharmacy Chemotherapy Verification Note:    Patient Name: Karlene Walters      Dx: Colon CA      Protocol: 5-FU+Leucovorin      *Dosing Reference*  Leucovorin 400 mg/m² IV over 2 hours on Day 1, followed by  Fluorouracil 400 mg/m²  IVP on Day 1, followed by    - Dose reduced by 20% to 320 mg/m² starting with Cycle 18 ([place] #11) on 7/5/16 due to neutropenia    Fluorouracil 1200 mg/m²  IV continuous infusion daily on Days 1 and 2 (2400 mg/m² IV over 46-48 hours) -    - Infusion Dose reduced by 20% starting with Cycle 18 ([place] #11) on 7/5/16 due to neutropenia   -20% reduction (1920 mg/m2) to be continued starting C28 per C Alsop APRN   14 day cycles for 12 cycles (adjuvant) or until disease progression or unacceptable toxicity  NCCN Guidelines for Colon Cancer. V.2.2015.  Jay GROVER, et al. Eur J Cancer.1999;35(9):1343-7.      Allergies:  Codeine; Oxaliplatin; Pcn; Sulfa drugs; and Tape     /71 mmHg  Pulse 86  Temp(Src) 36.3 °C (97.4 °F)  Resp 18  Ht 1.651 m (5' 5\")  Wt 77.3 kg (170 lb 6.7 oz)  BMI 28.36 kg/m2  SpO2 98% Body surface area is 1.88 meters squared.    Labs from 4/18/17:  ANC~ 1110  Plt = 138 k Hgb = 12.7  Labs from 4/18/17:  SCr = 0.90 mg/dL CrCl = ~64 mL/min  LFT = WNL   TBili = 1.0  Cyndee LUCERO/Alsop APRN aware of current labs, orders received to treat as ordered      Drug Order   (Drug name, dose, route, IV Fluid & volume, frequency, number of doses) Cycle: 36   Previous treatment:  4/4/17     Medication = Leucovorin (Wellcovorin)  Base Dose = 400 mg/m²  Calc Dose: Base Dose x 1.88 m² = 752 mg  Final Dose = 752 mg  Route = IV  Fluid & Volume = D5W 250 mL  Admin Duration = Over 60 minutes         <5% difference, ok to treat with final dose   Medication = Fluorouracil (5-FU)   Base Dose = 320 mg/m²  Calc Dose: Base Dose x 1.88 m² = 602 mg  Final Dose = 600 mg  Route = IVP  Fluid & Volume = 12 mL in syringe  Conc = 50 mg/mL  Admin Duration = Over 2-5 minutes         <5% difference, ok to " treat with final dose   Medication = Fluorouracil (5-FU)   Base Dose = 1920 mg/m²/46 hours  Calc Dose:Base Dose x 1.88 m² = 3610 mg  Final Dose = 3610 mg  Route = CIVI over 46 hours  Fluid & Volume = CADD pump 72.2 mL (plus 3 ml overfill)   Conc = 50 mg/mL  Admin Duration = Over 46 hours  Rate = 1.6 mL/hour          <5% difference, ok to treat with final dose     By my signature below, I confirm this process was performed independently with the BSA and all final chemotherapy dosing calculations congruent. I have reviewed the above chemotherapy order and that my calculation of the final dose and BSA (when applicable) corroborate those calculations of the  pharmacist.     Norris Mitchell, MigueD

## 2017-04-18 NOTE — PROGRESS NOTES
Chemotherapy Verification - SECONDARY RN       Height = 165.1 cm  Weight = 77.3 kg  BSA = 1.88 m2       Medication: 5FU  Dose: 320 mg/m2  Calculated Dose: 601.6 mg                             (In mg/m2, AUC, mg/kg)     Medication: home infusion 5FU  Dose: 1920 mg/m2  Calculated Dose: 3609.6 mg                             (In mg/m2, AUC, mg/kg)      I confirm that this process was performed independently.

## 2017-04-18 NOTE — PROGRESS NOTES
"Subjective:      Karlene Walters is a 77 y.o. female who presents with Follow-Up for rectal cancer metastatic to liver.           HPI    Patient seen today in follow-up for rectal cancer metastatic to liver. She is accompanied by her  for today's appointment. Patient is scheduled to receive single agent 5-FU today.    Patient has been feeling well and tolerating treatment well. She denies any fevers or chills. Her appetite is stable and her weight is stable. She does have fatigue but it is very tolerable and stable as well. Patient is remains very active. She denies any chest pain, heart palpitations. She has noticed some swelling in her upper and lower extremities, more so in the morning. She still has the persistent right wrist pain as well as now pain is noted in the left upper extremity as well. Patient denies any coughing or wheezing. She does note some shortness of breath with exertion. Patient denies nausea, vomiting, and she is having normal stool output via her ileostomy. Patient noted to have sharp shooting pain in her left upper quadrant 2-3 times and once in her back over the last few weeks. She is voiding without difficulty. She denies any dizziness numbness or tingling. Her skin is dry but denies any cracking and peeling of her hands or her feet. Patient denies mouth sores. She is slightly anxious regarding her elevation of her CEA that is slowly trending up. Despite the slow trend patient's most recent CT scan of the chest, abdomen, and pelvis completed on March 16, 2017 there is no evidence of disease noted.    Patient has had workup for her right wrist pain. CT scan showed tenosynovitis. She still complaining of pain and sometimes difficulty lifting things.    Allergies   Allergen Reactions   • Codeine      \"gets drunk\"   • Oxaliplatin Anaphylaxis   • Pcn [Penicillins] Itching   • Sulfa Drugs Itching   • Tape Rash     PAPER TAPE OK     Current Outpatient Prescriptions on File Prior to " Visit   Medication Sig Dispense Refill   • rivaroxaban (XARELTO) 20 MG Tab tablet Take 1 Tab by mouth with dinner. 30 Tab 6   • ranitidine (ZANTAC) 150 MG Tab Take 1 Tab by mouth 2 Times a Day. 60 Tab 3   • famotidine (PEPCID) 40 MG Tab Take 1 Tab by mouth every day. 90 Tab 2   • cyanocobalamin (VITAMIN B-12) 500 MCG Tab Take 500 mcg by mouth every day.     • Multiple Vitamins-Minerals (CENTRUM SILVER PO) Take  by mouth.     • Cholecalciferol (VITAMIN D3) 400 UNITS CAPS Take 1 Cap by mouth every day.     • loratadine (CLARITIN) 10 MG TABS Take 10 mg by mouth every day. Indications: Hayfever     • furosemide (LASIX) 20 MG Tab Take 1 Tab by mouth every day. PRN EDEMA 30 Tab 3   • potassium chloride ER (KLOR-CON) 10 MEQ tablet Take 1 Tab by mouth every day. 30 Tab 3   • lidocaine-prilocaine (EMLA) 2.5-2.5 % Cream APPLY A THICK FILM OVER THE PORT ACCESS SITE PRIOR TO ACCESS 30 g 2   • loperamide (IMODIUM) 1 MG/5ML Liquid Take 2 mg by mouth 4 times a day as needed for Diarrhea.     • ondansetron (ZOFRAN) 4 MG Tab tablet      • ibuprofen (MOTRIN) 200 MG Tab Take 200 mg by mouth every 6 hours as needed.     • oxycodone immediate-release (ROXICODONE) 5 MG Tab Take 5 mg by mouth every four hours as needed for Severe Pain.       No current facility-administered medications on file prior to visit.         Review of Systems   Constitutional: Positive for malaise/fatigue (present but stable and tolerable). Negative for fever, chills and weight loss.   Respiratory: Positive for shortness of breath (still present with exertion). Negative for cough and wheezing.    Cardiovascular: Positive for leg swelling. Negative for chest pain and palpitations.   Gastrointestinal: Negative for nausea, vomiting, diarrhea and constipation.   Genitourinary: Negative for dysuria.   Skin: Negative for itching and rash.        She does have dry skin, but no cracking and peeling of her skin   Neurological: Negative for dizziness, tingling and  "headaches.          Objective:     /76 mmHg  Pulse 98  Temp(Src) 36.6 °C (97.9 °F)  Resp 16  Ht 1.651 m (5' 5\")  Wt 77.1 kg (169 lb 15.6 oz)  BMI 28.29 kg/m2  SpO2 88%     Physical Exam   Constitutional: She is oriented to person, place, and time. She appears well-developed and well-nourished. No distress.   HENT:   Head: Normocephalic and atraumatic.   Mouth/Throat: Oropharynx is clear and moist. No oropharyngeal exudate.   Eyes: Conjunctivae and EOM are normal. Pupils are equal, round, and reactive to light. Right eye exhibits no discharge. Left eye exhibits no discharge. No scleral icterus.   Neck: Normal range of motion. Neck supple. No thyromegaly present.   Cardiovascular: Normal rate, regular rhythm, normal heart sounds and intact distal pulses.  Exam reveals no gallop and no friction rub.    No murmur heard.  Pulmonary/Chest: Effort normal and breath sounds normal. No respiratory distress. She has no wheezes.   Abdominal: Soft. Bowel sounds are normal. She exhibits no distension. There is no tenderness.   Musculoskeletal: Normal range of motion. She exhibits edema (non-specific edema noted in upper and lower extremities). She exhibits no tenderness.   Lymphadenopathy:     She has no cervical adenopathy.   Neurological: She is alert and oriented to person, place, and time.   Skin: Skin is warm and dry. No rash noted. She is not diaphoretic. No erythema. No pallor.   Psychiatric: She has a normal mood and affect. Her behavior is normal.   Vitals reviewed.      Outpatient Infusion Services on 04/18/2017   Component Date Value Ref Range Status   • WBC 04/18/2017 3.0* 4.8 - 10.8 K/uL Final   • RBC 04/18/2017 3.78* 4.20 - 5.40 M/uL Final   • Hemoglobin 04/18/2017 12.7  12.0 - 16.0 g/dL Final   • Hematocrit 04/18/2017 37.3  37.0 - 47.0 % Final   • MCV 04/18/2017 98.7* 81.4 - 97.8 fL Final   • MCH 04/18/2017 33.6* 27.0 - 33.0 pg Final   • MCHC 04/18/2017 34.0  33.6 - 35.0 g/dL Final   • RDW 04/18/2017 " 56.4* 35.9 - 50.0 fL Final   • Platelet Count 04/18/2017 138* 164 - 446 K/uL Final   • MPV 04/18/2017 9.9  9.0 - 12.9 fL Final   • Neutrophils-Polys 04/18/2017 36.90* 44.00 - 72.00 % Final   • Lymphocytes 04/18/2017 37.20  22.00 - 41.00 % Final   • Monocytes 04/18/2017 19.30* 0.00 - 13.40 % Final   • Eosinophils 04/18/2017 5.00  0.00 - 6.90 % Final   • Basophils 04/18/2017 1.30  0.00 - 1.80 % Final   • Immature Granulocytes 04/18/2017 0.30  0.00 - 0.90 % Final   • Nucleated RBC 04/18/2017 0.00   Final   • Neutrophils (Absolute) 04/18/2017 1.11* 2.00 - 7.15 K/uL Final    Includes immature neutrophils, if present.   • Lymphs (Absolute) 04/18/2017 1.12  1.00 - 4.80 K/uL Final   • Monos (Absolute) 04/18/2017 0.58  0.00 - 0.85 K/uL Final   • Eos (Absolute) 04/18/2017 0.15  0.00 - 0.51 K/uL Final   • Baso (Absolute) 04/18/2017 0.04  0.00 - 0.12 K/uL Final   • Immature Granulocytes (abs) 04/18/2017 0.01  0.00 - 0.11 K/uL Final   • NRBC (Absolute) 04/18/2017 0.00   Final   • Sodium 04/18/2017 143  135 - 145 mmol/L Final   • Potassium 04/18/2017 3.2* 3.6 - 5.5 mmol/L Final   • Chloride 04/18/2017 109  96 - 112 mmol/L Final   • Co2 04/18/2017 21  20 - 33 mmol/L Final   • Anion Gap 04/18/2017 13.0* 0.0 - 11.9 Final   • Glucose 04/18/2017 103* 65 - 99 mg/dL Final   • Bun 04/18/2017 16  8 - 22 mg/dL Final   • Creatinine 04/18/2017 0.90  0.50 - 1.40 mg/dL Final   • Calcium 04/18/2017 8.8  8.5 - 10.5 mg/dL Final   • AST(SGOT) 04/18/2017 19  12 - 45 U/L Final   • ALT(SGPT) 04/18/2017 14  2 - 50 U/L Final   • Alkaline Phosphatase 04/18/2017 95  30 - 99 U/L Final   • Total Bilirubin 04/18/2017 1.0  0.1 - 1.5 mg/dL Final   • Albumin 04/18/2017 3.6  3.2 - 4.9 g/dL Final   • Total Protein 04/18/2017 5.8* 6.0 - 8.2 g/dL Final   • Globulin 04/18/2017 2.2  1.9 - 3.5 g/dL Final   • A-G Ratio 04/18/2017 1.6   Final   • GFR If  04/18/2017 >60  >60 mL/min/1.73 m 2 Final   • GFR If Non  04/18/2017 >60  >60  mL/min/1.73 m 2 Final          Assessment/Plan:     1. Rectal cancer (CMS-HCC)     2. Secondary malignant neoplasm of liver (CMS-HCC)     3. Right wrist pain  MR-WRIST WITH & W/O RIGHT    MR-FOREARM-WITH & W/O RIGHT       Plan  1. I reviewed patient's labs, CBC and CMP proceed with treatment as planned today. Patient scheduled to receive single agent 5-FU.    2. Patient has been having a slightly trending up CEA. According to the last CT scan there was no evidence of malignancy. We will attempt to get patient into see her gastroenterologist Dr. Guillen sooner for possible scope to determine the reason for the elevation of her CEA.    3. Patient's having persistent pain in the right wrist. CT scan shows tenosynovitis. We will proceed with an MRI at this time due to persistent worsening of pain.    4. Patient to continue with chemotherapy every 2 weeks. She will establish care with new medical oncologist on Wednesday, May 10 with Dr. Hardy. She is scheduled to go on a cruise that Friday for one week and we will hold her chemotherapy at that time. She will then be scheduled to have her chemotherapy on Tuesday, May 23 after her vacation. Patient to call sooner if needed.    Patient clinical status was discussed with Dr. Cotter and she did speak to the patient as well today. She is in agreement with the plan.

## 2017-04-18 NOTE — PROGRESS NOTES
"Pt presents ambulatory with spouse for 5FU/leucovorin and home pump infusion. Port remains accessed and blood return noted. RN contacted provider and order received to proceed with ANC and to replace PO potassium today.  Pre-medication given. Leucovorin currently infusing over 2 hrs per pt request, \"it has always been 2 hrs and I don't want to change it\". Pt resting in chair with call light within reach.   "

## 2017-04-18 NOTE — PROGRESS NOTES
"Pharmacy Chemotherapy Verification    Patient Name: Karlene Walters      Dx: Colon CA     Cycle:  36 ([place] cycle 29)  Previous treatment: 4/4/17    Protocol: 5 FU/Leucovorin    Leucovorin 400 mg/m² IV over 2 hours on Day 1, followed by  Fluorouracil 400 mg/m²  IVP on Day 1, followed by   - Dose reduced by 20% to 320 mg/m² starting with Cycle 18 ([place] #11) on 7/5/16 due to neutropenia    Fluorouracil 1200 mg/m²  IV continuous infusion daily on Days 1 and 2 (2400 mg/m² IV over 46-48 hours) -   - Infusion Dose reduced by 20% starting with Cycle 18 ([place] #11) on 7/5/16 due to neutropenia   -20% reduction (1920 mg/m2) to be continued starting C28 per C Alsop APRN   14 day cycles for 12 cycles (adjuvant) or until disease progression or unacceptable toxicity  NCCN Guidelines for Colon Cancer. V.2.2015.  Jay T, et al. Eur J Cancer.1999;35(9):1343-7.          Allergies: Codeine; Oxaliplatin; Pcn; Sulfa drugs; and Tape  /71 mmHg  Pulse 86  Temp(Src) 36.3 °C (97.4 °F)  Resp 18  Ht 1.651 m (5' 5\")  Wt 77.3 kg (170 lb 6.7 oz)  BMI 28.36 kg/m2  SpO2 98% Body surface area is 1.88 meters squared.     Labs 4/18/17  ANC~1110 - OK to treat per MD Plt = 138k   Hgb = 12.7     SCr = 0.9 mg/dL CrCl ~63.8 mL/min   AST/ALT/AP/TBili = 19/14/95/1  Leucovorin 400 mg/m² x 1.88 m² = 752 mg   <5% difference, OK to treat with final dose = 752 mg IV    Fluorouracil (5-FU) 320 mg/m² x 1.88 m² = 601.6 mg   <5% difference, OK to treat with final dose = 600 mg IVP    Fluorouracil (5-FU) 1920 mg/m2 x 1.88 m2 = 3609.6 mg   <5% difference, OK to treat with final dose = 3610 mg CIVI over 46 hours     Lora Hernandez, PharmD         "

## 2017-04-18 NOTE — PROGRESS NOTES
Pt arrived to IS, ambulatory with , for labs prior to chemotherapy. Pt voices no new complaints. Port accessed in sterile manner, positive blood return noted. Labs drawn per order. Port flushed and heparin locked per policy, port left accessed for treatment. Pt left IS with no s/sx of distress to see MD. Follow up appointment confirmed for chemotherapy today.

## 2017-04-19 ENCOUNTER — TELEPHONE (OUTPATIENT)
Dept: HEMATOLOGY ONCOLOGY | Facility: MEDICAL CENTER | Age: 77
End: 2017-04-19

## 2017-04-19 NOTE — TELEPHONE ENCOUNTER
Patient called wanting to let Dr. Cotter know she was able get her appointment earlier with Dr. Hawkins.

## 2017-04-20 ENCOUNTER — OUTPATIENT INFUSION SERVICES (OUTPATIENT)
Dept: ONCOLOGY | Facility: MEDICAL CENTER | Age: 77
End: 2017-04-20
Attending: INTERNAL MEDICINE
Payer: MEDICARE

## 2017-04-20 VITALS
OXYGEN SATURATION: 98 % | HEART RATE: 81 BPM | WEIGHT: 173.06 LBS | HEIGHT: 65 IN | TEMPERATURE: 97.5 F | DIASTOLIC BLOOD PRESSURE: 66 MMHG | RESPIRATION RATE: 18 BRPM | SYSTOLIC BLOOD PRESSURE: 148 MMHG | BODY MASS INDEX: 28.83 KG/M2

## 2017-04-20 DIAGNOSIS — C20 RECTAL CANCER (HCC): ICD-10-CM

## 2017-04-20 PROCEDURE — 96523 IRRIG DRUG DELIVERY DEVICE: CPT

## 2017-04-20 PROCEDURE — 700111 HCHG RX REV CODE 636 W/ 250 OVERRIDE (IP)

## 2017-04-20 RX ADMIN — HEPARIN 500 UNITS: 100 SYRINGE at 11:16

## 2017-04-20 ASSESSMENT — PAIN SCALES - GENERAL: PAINLEVEL: 4=SLIGHT-MODERATE PAIN

## 2017-04-20 NOTE — PROGRESS NOTES
Patient here for pump disconnect. 0.3 ml remaining in pump. 73.20 ml given. Minimum amount of 72.2 ml given. Pump turn off. Cassette removed from pump. Brisk blood return noted. Port flushed with 20 ml of NS and heparin locked. Port de-accessed; gauze applied over site. Next appointment scheduled. Discharged to self care; no apparent distress noted.

## 2017-05-01 ENCOUNTER — HOSPITAL ENCOUNTER (OUTPATIENT)
Dept: RADIOLOGY | Facility: MEDICAL CENTER | Age: 77
End: 2017-05-01
Attending: NURSE PRACTITIONER
Payer: MEDICARE

## 2017-05-01 DIAGNOSIS — M25.531 RIGHT WRIST PAIN: ICD-10-CM

## 2017-05-01 PROCEDURE — 700117 HCHG RX CONTRAST REV CODE 255: Performed by: NURSE PRACTITIONER

## 2017-05-01 PROCEDURE — 73220 MRI UPPR EXTREMITY W/O&W/DYE: CPT | Mod: RT

## 2017-05-01 PROCEDURE — A9579 GAD-BASE MR CONTRAST NOS,1ML: HCPCS | Performed by: NURSE PRACTITIONER

## 2017-05-01 RX ADMIN — GADODIAMIDE 20 ML: 287 INJECTION INTRAVENOUS at 12:37

## 2017-05-02 ENCOUNTER — APPOINTMENT (OUTPATIENT)
Dept: ONCOLOGY | Facility: MEDICAL CENTER | Age: 77
End: 2017-05-02
Payer: MEDICARE

## 2017-05-02 ENCOUNTER — TELEPHONE (OUTPATIENT)
Dept: HEMATOLOGY ONCOLOGY | Facility: MEDICAL CENTER | Age: 77
End: 2017-05-02

## 2017-05-02 ENCOUNTER — OUTPATIENT INFUSION SERVICES (OUTPATIENT)
Dept: ONCOLOGY | Facility: MEDICAL CENTER | Age: 77
End: 2017-05-02
Attending: INTERNAL MEDICINE
Payer: MEDICARE

## 2017-05-02 VITALS
DIASTOLIC BLOOD PRESSURE: 58 MMHG | HEIGHT: 66 IN | BODY MASS INDEX: 27.39 KG/M2 | RESPIRATION RATE: 18 BRPM | TEMPERATURE: 97.7 F | OXYGEN SATURATION: 100 % | SYSTOLIC BLOOD PRESSURE: 133 MMHG | WEIGHT: 170.42 LBS | HEART RATE: 81 BPM

## 2017-05-02 DIAGNOSIS — M67.833 OTHER SPECIFIED DISORDERS OF TENDON, RIGHT WRIST: ICD-10-CM

## 2017-05-02 DIAGNOSIS — C20 RECTAL CANCER (HCC): ICD-10-CM

## 2017-05-02 LAB
ALBUMIN SERPL BCP-MCNC: 4 G/DL (ref 3.2–4.9)
ALBUMIN/GLOB SERPL: 1.7 G/DL
ALP SERPL-CCNC: 98 U/L (ref 30–99)
ALT SERPL-CCNC: 17 U/L (ref 2–50)
ANION GAP SERPL CALC-SCNC: 9 MMOL/L (ref 0–11.9)
AST SERPL-CCNC: 21 U/L (ref 12–45)
BASOPHILS # BLD AUTO: 1.1 % (ref 0–1.8)
BASOPHILS # BLD: 0.05 K/UL (ref 0–0.12)
BILIRUB SERPL-MCNC: 0.7 MG/DL (ref 0.1–1.5)
BUN SERPL-MCNC: 19 MG/DL (ref 8–22)
CALCIUM SERPL-MCNC: 9.6 MG/DL (ref 8.5–10.5)
CEA SERPL-MCNC: 13.2 NG/ML (ref 0–3)
CHLORIDE SERPL-SCNC: 110 MMOL/L (ref 96–112)
CO2 SERPL-SCNC: 20 MMOL/L (ref 20–33)
CREAT SERPL-MCNC: 1.18 MG/DL (ref 0.5–1.4)
EOSINOPHIL # BLD AUTO: 0.11 K/UL (ref 0–0.51)
EOSINOPHIL NFR BLD: 2.5 % (ref 0–6.9)
ERYTHROCYTE [DISTWIDTH] IN BLOOD BY AUTOMATED COUNT: 55 FL (ref 35.9–50)
GFR SERPL CREATININE-BSD FRML MDRD: 44 ML/MIN/1.73 M 2
GLOBULIN SER CALC-MCNC: 2.4 G/DL (ref 1.9–3.5)
GLUCOSE SERPL-MCNC: 102 MG/DL (ref 65–99)
HCT VFR BLD AUTO: 37.9 % (ref 37–47)
HGB BLD-MCNC: 12.7 G/DL (ref 12–16)
IMM GRANULOCYTES # BLD AUTO: 0.02 K/UL (ref 0–0.11)
IMM GRANULOCYTES NFR BLD AUTO: 0.5 % (ref 0–0.9)
LYMPHOCYTES # BLD AUTO: 1.22 K/UL (ref 1–4.8)
LYMPHOCYTES NFR BLD: 27.5 % (ref 22–41)
MCH RBC QN AUTO: 33.4 PG (ref 27–33)
MCHC RBC AUTO-ENTMCNC: 33.5 G/DL (ref 33.6–35)
MCV RBC AUTO: 99.7 FL (ref 81.4–97.8)
MONOCYTES # BLD AUTO: 0.53 K/UL (ref 0–0.85)
MONOCYTES NFR BLD AUTO: 12 % (ref 0–13.4)
NEUTROPHILS # BLD AUTO: 2.5 K/UL (ref 2–7.15)
NEUTROPHILS NFR BLD: 56.4 % (ref 44–72)
NRBC # BLD AUTO: 0 K/UL
NRBC BLD AUTO-RTO: 0 /100 WBC
PLATELET # BLD AUTO: 169 K/UL (ref 164–446)
PMV BLD AUTO: 10.1 FL (ref 9–12.9)
POTASSIUM SERPL-SCNC: 3.7 MMOL/L (ref 3.6–5.5)
PROT SERPL-MCNC: 6.4 G/DL (ref 6–8.2)
RBC # BLD AUTO: 3.8 M/UL (ref 4.2–5.4)
SODIUM SERPL-SCNC: 139 MMOL/L (ref 135–145)
WBC # BLD AUTO: 4.4 K/UL (ref 4.8–10.8)

## 2017-05-02 PROCEDURE — 700111 HCHG RX REV CODE 636 W/ 250 OVERRIDE (IP)

## 2017-05-02 PROCEDURE — 96375 TX/PRO/DX INJ NEW DRUG ADDON: CPT

## 2017-05-02 PROCEDURE — 85025 COMPLETE CBC W/AUTO DIFF WBC: CPT

## 2017-05-02 PROCEDURE — A4212 NON CORING NEEDLE OR STYLET: HCPCS

## 2017-05-02 PROCEDURE — 96366 THER/PROPH/DIAG IV INF ADDON: CPT

## 2017-05-02 PROCEDURE — 700111 HCHG RX REV CODE 636 W/ 250 OVERRIDE (IP): Performed by: NURSE PRACTITIONER

## 2017-05-02 PROCEDURE — 96367 TX/PROPH/DG ADDL SEQ IV INF: CPT

## 2017-05-02 PROCEDURE — 82378 CARCINOEMBRYONIC ANTIGEN: CPT

## 2017-05-02 PROCEDURE — 96409 CHEMO IV PUSH SNGL DRUG: CPT

## 2017-05-02 PROCEDURE — G0498 CHEMO EXTEND IV INFUS W/PUMP: HCPCS

## 2017-05-02 PROCEDURE — 80053 COMPREHEN METABOLIC PANEL: CPT

## 2017-05-02 RX ORDER — DEXAMETHASONE SODIUM PHOSPHATE 4 MG/ML
8 INJECTION, SOLUTION INTRA-ARTICULAR; INTRALESIONAL; INTRAMUSCULAR; INTRAVENOUS; SOFT TISSUE ONCE
Status: COMPLETED | OUTPATIENT
Start: 2017-05-02 | End: 2017-05-02

## 2017-05-02 RX ADMIN — FLUOROURACIL 605 MG: 50 INJECTION, SOLUTION INTRAVENOUS at 13:26

## 2017-05-02 RX ADMIN — FLUOROURACIL 3630 MG: 50 INJECTION, SOLUTION INTRAVENOUS at 13:40

## 2017-05-02 RX ADMIN — LEUCOVORIN CALCIUM 756 MG: 350 INJECTION, POWDER, LYOPHILIZED, FOR SOLUTION INTRAMUSCULAR; INTRAVENOUS at 11:25

## 2017-05-02 RX ADMIN — DEXAMETHASONE SODIUM PHOSPHATE 8 MG: 4 INJECTION, SOLUTION INTRAMUSCULAR; INTRAVENOUS at 10:41

## 2017-05-02 ASSESSMENT — PAIN SCALES - GENERAL: PAINLEVEL: 4=SLIGHT-MODERATE PAIN

## 2017-05-02 NOTE — PROGRESS NOTES
Chemotherapy Verification - SECONDARY RN       Height = 166.4 cm  Weight = 77.3 kg  BSA = 1.89 m2       Medication: 5FU  Dose: 320 mg/m2  Calculated Dose: 604.8 mg                             (In mg/m2, AUC, mg/kg)     Medication: Home infusion 5FU  Dose: 1920 mg/m2  Calculated Dose: 3628.8 mg                             (In mg/m2, AUC, mg/kg)      I confirm that this process was performed independently.

## 2017-05-02 NOTE — PROGRESS NOTES
Pt to infusion services ambulatory per self.  Pt here for scheduled chemotherapy, Leucovorin and 5FU with 46 hour 5FU continuous home infusion - day 1, cycle 29 per [place].  Plan of care reviewed.  Pt verbalizes understanding.  Pt with left chest port with EMLA cream in place.  Port site cleansed and port accessed in sterile fashion.  Port flushes easily; + blood return verified.  Labs draw per MD orders.  Lab results reviewed and within parameters for treatment today.  Pre medication administered per MD orders.  Leucovorin administered per MD orders.  Pt requests Leucovorin infusion over 2 hours today.  Pt resting in chair.  Call light at hand.  Spouse at chairside.

## 2017-05-02 NOTE — PROGRESS NOTES
"Pharmacy Chemotherapy Verification    Patient Name: Karlene Walters      Dx: Colon CA      Protocol: 5-FU+Leucovorin      *Dosing Reference*  Leucovorin 400 mg/m² IV over 2 hours on Day 1, followed by  Fluorouracil 400 mg/m²  IVP on Day 1, followed by    - Dose reduced by 20% to 320 mg/m² starting with Cycle 18 ([place] #11) on 7/5/16 due to neutropenia    Fluorouracil 1200 mg/m²  IV continuous infusion daily on Days 1 and 2 (2400 mg/m² IV over 46-48 hours) -    - Infusion Dose reduced by 20% starting with Cycle 18 ([place] #11) on 7/5/16 due to neutropenia   -20% reduction (1920 mg/m2) to be continued starting C28 per C Alsop APRN   14 day cycles for 12 cycles (adjuvant) or until disease progression or unacceptable toxicity  NCCN Guidelines for Colon Cancer. V.2.2015.  Jay GROVER, et al. Eur J Cancer.1999;35(9):1343-7.      Allergies:  Codeine; Oxaliplatin; Pcn; Sulfa drugs; and Tape     /58 mmHg  Pulse 81  Temp(Src) 36.5 °C (97.7 °F)  Resp 18  Ht 1.664 m (5' 5.5\")  Wt 77.3 kg (170 lb 6.7 oz)  BMI 27.92 kg/m2  SpO2 100% Body surface area is 1.89 meters squared.    Labs 5/2/17  ANC~ 2500  Plt = 169 k Hgb = 12.7  SCr = 1.18 mg/dL CrCl = ~48.7 mL/min  AST/ALT/AP/Tbili = 21/17/98/0.7     Drug Order   (Drug name, dose, route, IV Fluid & volume, frequency, number of doses) Cycle: 37  Previous treatment:  4/18/17     Medication = Leucovorin (Wellcovorin)  Base Dose = 400 mg/m²  Calc Dose: Base Dose x 1.89 m² = 756 mg  Final Dose = 756 mg  Route = IV  Fluid & Volume = D5W 250 mL  Admin Duration = Over 60 minutes         <5% difference, OK to treat with final dose   Medication = Fluorouracil (5-FU)   Base Dose = 320 mg/m²  Calc Dose: Base Dose x 1.89 m² = 604.8 mg  Final Dose = 605 mg  Route = IVP  Fluid & Volume = 12.1 mL in syringe  Conc = 50 mg/mL  Admin Duration = Over 2-5 minutes         <5% difference, OK to treat with final dose   Medication = Fluorouracil (5-FU)   Base Dose = 1920 mg/m²/46 hours  Calc " Dose:Base Dose x 1.89 m² = 3628.8 mg  Final Dose = 3630 mg  Route = CIVI over 46 hours  Fluid & Volume = CADD pump 72.6 mL (plus 3 ml overfill)   Conc = 50 mg/mL  Admin Duration = Over 46 hours  Rate = 1.6 mL/hour          <5% difference, OK to treat with final dose     By my signature below, I confirm this process was performed independently with the BSA and all final chemotherapy dosing calculations congruent. I have reviewed the above chemotherapy order and that my calculation of the final dose and BSA (when applicable) corroborate those calculations of the  pharmacist.     Lora Hernandez, PharmD

## 2017-05-02 NOTE — PROGRESS NOTES
"Pharmacy Chemotherapy Verification    Patient Name: Karlene Walters      Dx: Colon CA     Cycle:  37 ([place] cycle 29)  Previous treatment: 4/18/17    Protocol: 5 FU/Leucovorin    Leucovorin 400 mg/m² IV over 2 hours on Day 1, followed by  Fluorouracil 400 mg/m²  IVP on Day 1, followed by   - Dose reduced by 20% to 320 mg/m² starting with Cycle 18 ([place] #11) on 7/5/16 due to neutropenia    Fluorouracil 1200 mg/m²  IV continuous infusion daily on Days 1 and 2 (2400 mg/m² IV over 46-48 hours) -   - Infusion Dose reduced by 20% starting with Cycle 18 ([place] #11) on 7/5/16 due to neutropenia   -20% reduction (1920 mg/m2) to be continued starting C28 per C Alsop APRN   14 day cycles for 12 cycles (adjuvant) or until disease progression or unacceptable toxicity  NCCN Guidelines for Colon Cancer. V.2.2015.  Jay T, et al. Eur J Cancer.1999;35(9):1343-7.          Allergies: Codeine; Oxaliplatin; Pcn; Sulfa drugs; and Tape  /58 mmHg  Pulse 81  Temp(Src) 36.5 °C (97.7 °F)  Resp 18  Ht 1.664 m (5' 5.5\")  Wt 77.3 kg (170 lb 6.7 oz)  BMI 27.92 kg/m2  SpO2 100% Body surface area is 1.89 meters squared.     Labs 5/2/17:  ANC~ 2500 Plt = 169k   Hgb = 12.7   SCr = 1.18 mg/dL CrCl ~ 49 mL/min LFT's = WNL TBili = 0.7  K+ = 3.7    Leucovorin 400 mg/m² x 1.89 m² = 756 mg   <5% difference, OK to treat with final dose = 756 mg IV    Fluorouracil (5-FU) 320 mg/m² x 1.89 m² = 604.8 mg   <5% difference, OK to treat with final dose = 605 mg IVP    Fluorouracil (5-FU) 1920 mg/m2 x 1.89 m2 = 3628.8 mg   <5% difference, OK to treat with final dose = 3630mg CIVI over 46 hours     Sofiya Pedro, PharmD         "

## 2017-05-02 NOTE — PROGRESS NOTES
Chemotherapy Verification - PRIMARY RN      Height = 65.5 inches  Weight = 77.3 kg  BSA = 1.89 m2       Medication: Fluorouracil ([place]rucil)  Dose: 1920 mg/m2 - 80% of original dose 2400 mg/m2  Calculated Dose: 3628.8 mg for 46 hours - CADD Pump                            (In mg/m2, AUC, mg/kg)     Medication: Fluorouracil (Adrucil)  Dose: 320 mg/m2 - 80% of original dose 400 mg/m2  Calculated Dose: 604.8 mg                             (In mg/m2, AUC, mg/kg)      I confirm this process was performed independently with the BSA and all final chemotherapy dosing calculations congruent.  Any discrepancies of 5% or greater have been addressed with the chemotherapy pharmacist. The resolution of the discrepancy has been documented in the EPIC progress notes.

## 2017-05-02 NOTE — TELEPHONE ENCOUNTER
Patient completed a MRI of the right forearm. There appears to be tendon tears which is favored over tendinopathy with associated edema. No evidence of metastasis noted. I spoke to the patient on the phone today and gave her the results. Referral will be placed to orthopedics for further evaluation. I did review the results with Dr. Cotter as well. Patient is in agreement with the plan.      Mr-forearm-with & W/o Right    5/1/2017 5/1/2017 11:53 AM HISTORY/REASON FOR EXAM: Radial wrist swelling for 2 months. Rectal carcinoma with metastases TECHNIQUE/EXAM DESCRIPTION: MRI of the RIGHT forearm without and with contrast. The study was performed on a [place] 3.0 [employer] MRI scanner. T1 axial, T2 sagittal, T1 coronal, and T2 fat-suppressed spin-echo fat-suppressed axial and sagittal inversion recovery sequences were obtained. Postcontrast T1 axial fat-suppressed images were then obtained. 17 mL Omniscan contrast was administered intravenously. COMPARISON:  3/16/2017 wrist CT FINDINGS: A larger field-of-view was utilized than typical for a wrist and this makes analysis of the small structures at the wrist suboptimal There is a moderate to severe edema along the radial margin of the distal forearm and wrist. Discontinuity of the abductor pollicis longus and extensor pollicis brevis tendons is favored over severe tendinopathy. The tendons are normal proximal to the radiocarpal joint There is mild edema affecting the second extensor compartment but no fiber disruption is noted There is enhancement along the areas of edema in the radial wrist The remainder of the extensor tendons are normal in appearance. No flexor tendon abnormality is seen Carpus is notable for some intraosseous ganglia and/or cysts in the scaphoid, lunate, first metacarpal base. There is some first CMC and STT spurring, mild No abnormal effusion. No marrow replacement process seen suspicious for metastasis No gross median nerve thickening     5/1/2017   Centering no MRI evidence of metastasis Extensor pollicis brevis, abductor pollicis longus tendon tears favored over tendinopathy with associated edema and enhancement Mild STT and first CMC osteoarthritis

## 2017-05-02 NOTE — PROGRESS NOTES
Leucovorin infusion completed without incident.  5FU push administered by AZEB Persaud and 5FU via CADD pump for 46 hour infusion administered by Cori BOWIE; chemotherapy and pump settings verified by Christine BOWIE during lunch coverage.  Pt released to self care and care of spouse in no apparent distress after completion of treatment, ambulatory.  Pt to return on 05/04/17 for pump disconnect; appointment scheduled.

## 2017-05-04 ENCOUNTER — OUTPATIENT INFUSION SERVICES (OUTPATIENT)
Dept: ONCOLOGY | Facility: MEDICAL CENTER | Age: 77
End: 2017-05-04
Attending: INTERNAL MEDICINE
Payer: MEDICARE

## 2017-05-04 ENCOUNTER — TELEPHONE (OUTPATIENT)
Dept: MEDICAL GROUP | Facility: MEDICAL CENTER | Age: 77
End: 2017-05-04

## 2017-05-04 VITALS
SYSTOLIC BLOOD PRESSURE: 129 MMHG | HEIGHT: 65 IN | DIASTOLIC BLOOD PRESSURE: 77 MMHG | TEMPERATURE: 97.1 F | WEIGHT: 172.62 LBS | RESPIRATION RATE: 18 BRPM | HEART RATE: 85 BPM | OXYGEN SATURATION: 98 % | BODY MASS INDEX: 28.76 KG/M2

## 2017-05-04 PROCEDURE — 96523 IRRIG DRUG DELIVERY DEVICE: CPT

## 2017-05-04 PROCEDURE — 700111 HCHG RX REV CODE 636 W/ 250 OVERRIDE (IP)

## 2017-05-04 RX ADMIN — HEPARIN 500 UNITS: 100 SYRINGE at 11:45

## 2017-05-04 ASSESSMENT — PAIN SCALES - GENERAL: PAINLEVEL: 3=SLIGHT PAIN

## 2017-05-04 NOTE — PROGRESS NOTES
Pt presents ambulatory for spouse for pump de-access. Pt reports no adverse reactions from previous infusion. Pump confirmed to have infused proper amount of medication, see doc flow sheet. Port flushed with saline and heparin per protocol, de-accessed and gauze with tape applied to site. Pt to return on 5/23/17, as she is going on vacation, appt confirmed with pt. Pt left ambulatory in no distress at 1152.

## 2017-05-04 NOTE — TELEPHONE ENCOUNTER
Future Appointments       Provider Department Center    5/4/2017 11:30 AM RN 7 Infusion [address]    5/10/2017 8:20 AM Eric Hardy M.D. [facility]     5/12/2017 11:00 AM [facility]one[facility] [place] [address]    5/23/2017 9:00 AM RN 2 Infusion Services [facility]    5/23/2017 10:00 AM ARTURO Arteaga [facility]     5/23/2017 10:30 AM RN 6 Infusion Services [facility]    5/25/2017 1:30 PM INFUSION QUICK INJECT Infusion Services [facility]    6/6/2017 9:00 AM INFUSION QUICK INJECT Infusion Services [facility]    6/6/2017 10:00 AM Eric Hardy M.D. [facility]     6/6/2017 11:00 AM RN 2 Infusion Services [facility]    6/8/2017 11:30 AM INFUSION QUICK INJECT Infusion Services [facility]    6/20/2017 8:30 AM INFUSION QUICK INJECT Infusion Services [facility]    6/20/2017 9:00 AM ARTURO Arteaga [facility]     6/20/2017 10:00 AM RN 2 Infusion Services [facility]    6/22/2017 11:30 AM INFUSION QUICK INJECT Infusion Services [facility]    7/19/2017 10:30 AM Ahsan Haque M.D. [facility]         ANNUAL WELLNESS VISIT PRE-VISIT PLANNING     1.  Reviewed note from last office visit with PCP: YES    2.  If any orders were placed at last visit, do we have Results/Consult Notes?        •  Labs - Labs were not ordered at last office visit.       •  Imaging - Imaging was not ordered at last office visit.       •  Referrals - No referrals were ordered at last office visit.    3.  Immunizations were updated in Epic using WebIZ?: Yes       •  WebIZ Recommendations: HEPATITIS A , HEPATITIS B and Patient is up to date on all vaccines       •  Is patient due for Tdap? NO       •  Is patient due for Shingles? NO     4.  Patient is due for the following Health Maintenance Topics:   Health Maintenance Due   Topic Date Due   • IMM DTaP/Tdap/Td Vaccine  (1 - Tdap) 04/13/1959   • BONE DENSITY  04/13/2005   • IMM PNEUMOCOCCAL 65+ (ADULT) LOW/MEDIUM RISK SERIES (2 of 2 - PCV13) 09/05/2007   • Annual Wellness Visit  03/15/2017           5.  Reviewed/Updated the following with patient:       •   Preferred Pharmacy? YES       •   Preferred Lab? YES       •   Medications? YES. Was Abstract Encounter opened and chart updated? YES       •   Social History? YES. Was Abstract Encounter opened and chart updated? YES       •   Family History? YES. Was Abstract Encounter opened and chart updated? YES    6.  Care Team Updated:       •   DME Company (gait device, O2, CPAP, etc.): NO       •   Other Specialists (eye doctor, derm, GYN, cardiology, endo, etc): YES    7.  Patient has the following Care Path diagnoses on Problem List:  NONE    8.  Specialty Comments was updated with diagnosis information provided by SCP: YES    9.  Patient was advised: “This is a free wellness visit. The provider will screen for medical conditions to help you stay healthy. If you have other concerns to address you may be asked to discuss these at a separate visit or there may be an additional fee.”     10.  Patient was informed to arrive 15 min prior to their scheduled appointment and bring in their medication bottles?  YES

## 2017-05-05 ENCOUNTER — APPOINTMENT (OUTPATIENT)
Dept: RADIOLOGY | Facility: MEDICAL CENTER | Age: 77
End: 2017-05-05
Attending: NURSE PRACTITIONER
Payer: MEDICARE

## 2017-05-10 ENCOUNTER — OFFICE VISIT (OUTPATIENT)
Dept: HEMATOLOGY ONCOLOGY | Facility: MEDICAL CENTER | Age: 77
End: 2017-05-10
Payer: MEDICARE

## 2017-05-10 VITALS
SYSTOLIC BLOOD PRESSURE: 138 MMHG | WEIGHT: 169.53 LBS | RESPIRATION RATE: 15 BRPM | BODY MASS INDEX: 28.25 KG/M2 | HEIGHT: 65 IN | DIASTOLIC BLOOD PRESSURE: 74 MMHG | OXYGEN SATURATION: 90 % | TEMPERATURE: 97.5 F | HEART RATE: 61 BPM

## 2017-05-10 DIAGNOSIS — C78.7 SECONDARY MALIGNANT NEOPLASM OF LIVER (HCC): ICD-10-CM

## 2017-05-10 DIAGNOSIS — C18.9 MALIGNANT NEOPLASM OF COLON, UNSPECIFIED PART OF COLON (HCC): ICD-10-CM

## 2017-05-10 PROCEDURE — 4040F PNEUMOC VAC/ADMIN/RCVD: CPT | Performed by: INTERNAL MEDICINE

## 2017-05-10 PROCEDURE — 99215 OFFICE O/P EST HI 40 MIN: CPT | Performed by: INTERNAL MEDICINE

## 2017-05-10 PROCEDURE — G8419 CALC BMI OUT NRM PARAM NOF/U: HCPCS | Performed by: INTERNAL MEDICINE

## 2017-05-10 PROCEDURE — G8432 DEP SCR NOT DOC, RNG: HCPCS | Performed by: INTERNAL MEDICINE

## 2017-05-10 PROCEDURE — 1036F TOBACCO NON-USER: CPT | Performed by: INTERNAL MEDICINE

## 2017-05-10 PROCEDURE — 1101F PT FALLS ASSESS-DOCD LE1/YR: CPT | Performed by: INTERNAL MEDICINE

## 2017-05-10 ASSESSMENT — PAIN SCALES - GENERAL: PAINLEVEL: NO PAIN

## 2017-05-10 NOTE — MR AVS SNAPSHOT
"        Karlene Walters   5/10/2017 8:20 AM   Office Visit   MRN: [id number]    Department:  [facility]   Dept Phone:  [phone]    Description:  Female : 1940   Provider:  Eric Hardy M.D.           Reason for Visit     New Patient Transfer of care from Dr. Cotter to Dr. Hardy Dx: Rectal      Allergies as of 5/10/2017     Allergen Noted Reactions    Codeine 2011       \"gets drunk\"    Oxaliplatin 10/27/2015   Anaphylaxis    Pcn [Penicillins] 2011   Itching    Sulfa Drugs 2011   Itching    Tape 2013   Rash    PAPER TAPE OK      You were diagnosed with     Malignant neoplasm of colon, unspecified part of colon (CMS-HCC)   [4481170]       Secondary malignant neoplasm of liver (CMS-HCC)   [197.7.ICD-9-CM]         Vital Signs     Blood Pressure Pulse Temperature Respirations Height Weight    138/74 mmHg 61 36.4 °C (97.5 °F) 15 1.66 m (5' 5.35\") 76.9 kg (169 lb 8.5 oz)    Body Mass Index Oxygen Saturation Breastfeeding? Smoking Status          27.91 kg/m2 90% No Never Smoker         Basic Information     Date Of Birth Sex Race Ethnicity Preferred Language    1940 Female White Non- English      Your appointments     May 12, 2017 11:00 AM   ANNUAL WELLNESS with Manan Quiñonez M.D., [facility]    [facility] [address]    [address]  [address]  [facility] [address] [phone]            May 22, 2017  9:30 AM   CT PET60 with [address]   IMAGING [address]    [address]  [facility] [address] [phone]            May 23, 2017  9:00 AM   Lab Draw with RN 2   Infusion Services ([facility])    [address] [facility] [address]  [place] [address] [phone]            May 23, 2017 10:00 AM   ONCOLOGY EST PATIENT 30 MIN with ARTURO Arteaga   [facility] (--)    [address] [facility], [address]  [facility] [address] [phone]            May 23, 2017 10:30 AM   Est Chemo 3 with RN 6   Infusion Services " ([facility])    [address] [facility] [address]  [place] [address]   [phone]            May 25, 2017  1:30 PM   EST Port Flush / Central Line Care with INFUSION QUICK INJECT   Infusion Services ([facility])    [address] [facility] [address]  [place] [address]   [phone]            Jun 06, 2017  9:00 AM   Lab Draw with INFUSION QUICK INJECT   Infusion Services ([facility])    [address]  [place] [address]   [phone]            Jun 06, 2017 10:00 AM   ONCOLOGY EST PATIENT 30 MIN with Eric Hardy M.D.   [facility] (--)    [address], [address]  [place] [address]   [phone]            Jun 06, 2017 11:00 AM   Est Chemo 3 with RN 2   Infusion Services ([facility])    [address]  [place] [address]   [phone]            Jun 08, 2017 11:30 AM   EST Port Flush / Central Line Care with INFUSION QUICK INJECT   Infusion Services ([facility])    [address]  [place] [address]   [phone]            Jun 20, 2017  8:30 AM   Lab Draw with INFUSION QUICK INJECT   Infusion Services ([facility])    [address]  [place] [address]   [phone]            Jun 20, 2017  9:00 AM   ONCOLOGY EST PATIENT 30 MIN with ARTURO Arteaga   [facility] (--)    [address], [address]  [place] [address]   [phone]            Jun 20, 2017 10:00 AM   Est Chemo 3 with RN 2   Infusion Services ([facility])    [address]  [place] [address]   [phone]            Jun 22, 2017 11:30 AM   EST Port Flush / Central Line Care with INFUSION QUICK INJECT   Infusion Services ([facility])    [address]  [place] [address]   [phone]            Jul 19, 2017 10:30 AM   FOLLOW UP with Ahsan Haque M.D.   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   7/17/2013 - Present    Abnormal EKG R94.31 High  8/12/2013 - Present    Hemorrhage of rectum and anus K62.5   10/23/2013 - Present    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   10/15/2014 - Present    Thyroid nodule E04.1   12/19/2014 - Present    Secondary malignant neoplasm of liver (CMS-HCC) C78.7   7/6/2015 - Present    Dehydration E86.0   8/14/2015 - Present    Nausea & vomiting R11.2 Medium  8/15/2015 - Present    Colon cancer (CMS-HCC) C18.9   8/16/2015 - Present    Pulmonary embolism (CMS-HCC) I26.99 High  8/18/2015 - Present    Ileitis K52.9   8/24/2015 - Present    Diarrhea R19.7   8/24/2015 - Present    Hyperlipidemia E78.5   9/17/2015 - Present    Lightheadedness R42   9/17/2015 - Present    Shortness of breath R06.02 High  10/5/2015 - Present    Routine general medical examination at a health care facility Z00.00   3/14/2016 - Present    Renal insufficiency N28.9   3/14/2016 - Present    Hyperglycemia R73.9   3/14/2016 - Present    Decreased GFR R94.4   8/16/2016 - Present    Chemotherapy-induced neutropenia (CMS-HCC) D70.1   9/28/2016 - Present      Health Maintenance        Date Due Completion Dates    BONE DENSITY 4/13/2005 ---    IMM PNEUMOCOCCAL 65+ (ADULT) LOW/MEDIUM RISK SERIES (2 of 2 - PCV13) 5/3/2018 (Originally 9/5/2007) 9/5/2006    IMM DTaP/Tdap/Td Vaccine (1 - Tdap) 5/3/2018 (Originally [birth year]1959) ---    COLONOSCOPY 8/23/2025 8/23/2015, 9/20/2012            Current Immunizations     Influenza TIV (IM) 9/5/2013  6:12 AM, 10/12/2012  6:09 PM    Influenza Vaccine Quad Inj (Preserved) 10/18/2014    Pneumococcal polysaccharide vaccine (PPSV-23) 9/5/2006    SHINGLES VACCINE 1/14/2015      Below and/or attached  are the medications your provider expects you to take. Review all of your home medications and newly ordered medications with your provider and/or pharmacist. Follow medication instructions as directed by your provider and/or pharmacist. Please keep your medication list with you and share with your provider. Update the information when medications are discontinued, doses are changed, or new medications (including over-the-counter products) are added; and carry medication information at all times in the event of emergency situations     Allergies:  CODEINE - (reactions not documented)     OXALIPLATIN - Anaphylaxis     PCN - Itching     SULFA DRUGS - Itching     TAPE - Rash               Medications  Valid as of: May 10, 2017 -  9:14 AM    Generic Name Brand Name Tablet Size Instructions for use    Cholecalciferol (Cap) Vitamin D3 400 UNITS Take 1 Cap by mouth every day.        Cyanocobalamin (Tab) VITAMIN B-12 500 MCG Take 500 mcg by mouth every day.        Famotidine (Tab) PEPCID 40 MG Take 1 Tab by mouth every day.        Furosemide (Tab) LASIX 20 MG Take 1 Tab by mouth every day. PRN EDEMA        Ibuprofen (Tab) MOTRIN 200 MG Take 200 mg by mouth every 6 hours as needed.        Lidocaine-Prilocaine (Cream) EMLA 2.5-2.5 % APPLY A THICK FILM OVER THE PORT ACCESS SITE PRIOR TO ACCESS        Loperamide HCl (Liquid) IMODIUM 1 MG/5ML Take 2 mg by mouth 4 times a day as needed for Diarrhea.        Loratadine (Tab) CLARITIN 10 MG Take 10 mg by mouth every day. Indications: Hayfever        Multiple Vitamins-Minerals   Take  by mouth.        Ondansetron HCl (Tab) ZOFRAN 4 MG         OxyCODONE HCl (Tab) ROXICODONE 5 MG Take 5 mg by mouth every four hours as needed for Severe Pain.        Potassium Chloride (Tab CR) KLOR-CON 10 MEQ Take 1 Tab by mouth every day.        RaNITidine HCl (Tab) ZANTAC 150 MG Take 1 Tab by mouth 2 Times a Day.        Rivaroxaban (Tab) XARELTO 20 MG Take 1 Tab by mouth with dinner.        .                    Medicines prescribed today were sent to:     [facility] [place] [place] - [address]    [address] [facility] [place] [address]    Phone: [phone] Fax: [phone]    Open 24 Hours?: No      Medication refill instructions:       If your prescription bottle indicates you have medication refills left, it is not necessary to call your provider’s office. Please contact your pharmacy and they will refill your medication.    If your prescription bottle indicates you do not have any refills left, you may request refills at any time through one of the following ways: The online Janrain system (except Urgent Care), by calling your provider’s office, or by asking your pharmacy to contact your provider’s office with a refill request. Medication refills are processed only during regular business hours and may not be available until the next business day. Your provider may request additional information or to have a follow-up visit with you prior to refilling your medication.   *Please Note: Medication refills are assigned a new Rx number when refilled electronically. Your pharmacy may indicate that no refills were authorized even though a new prescription for the same medication is available at the pharmacy. Please request the medicine by name with the pharmacy before contacting your provider for a refill.           Janrain Access Code: [email or web address]  Expires: 6/8/2017 12:46 PM    Janrain  A secure, online tool to manage your health information     [employer]’s Janrain® is a secure, online tool that connects you to your personalized health information from the privacy of your home -- day or night - making it very easy for you to manage your healthcare. Once the activation process is completed, you can even access your medical information using the Janrain bart, which is available for free in the Apple Bart store or Google Play store.     Janrain provides the following levels of  access (as shown below):   My Chart Features   [facility] Primary [facility] Doctor [facility]  Specialists [facility]  Urgent  Care Non-[facility]  Primary Care  Doctor   Email your healthcare team securely and privately 24/7 X X X    Manage appointments: schedule your next appointment; view details of past/upcoming appointments X      Request prescription refills. X      View recent personal medical records, including lab and immunizations X X X X   View health record, including health history, allergies, medications X X X X   Read reports about your outpatient visits, procedures, consult and ER notes X X X X   See your discharge summary, which is a recap of your hospital and/or ER visit that includes your diagnosis, lab results, and care plan. X X       How to register for Mashery:  1. Go to  [email or web address].  2. Click on the Sign Up Now box, which takes you to the New Member Sign Up page. You will need to provide the following information:  a. Enter your Mashery Access Code exactly as it appears at the top of this page. (You will not need to use this code after you’ve completed the sign-up process. If you do not sign up before the expiration date, you must request a new code.)   b. Enter your date of birth.   c. Enter your home email address.   d. Click Submit, and follow the next screen’s instructions.  3. Create a Mashery ID. This will be your Mashery login ID and cannot be changed, so think of one that is secure and easy to remember.  4. Create a Mashery password. You can change your password at any time.  5. Enter your Password Reset Question and Answer. This can be used at a later time if you forget your password.   6. Enter your e-mail address. This allows you to receive e-mail notifications when new information is available in Mashery.  7. Click Sign Up. You can now view your health information.    For assistance activating your Mashery account, call [phone]

## 2017-05-10 NOTE — Clinical Note
May 10, 2017        Karlene Walters  : 1940           To Whom It My Concern:      Mrs. Walters is a patient of mine she is diagnosis with Colon Cancer.  She is currently on active treatment and she is currently taking the following mediations:     Zofran, Lidocaine, Famotidine, Zantac, Potassium Chloride, Lasix, Claritin, Vitamin D3, Vitamin B12, Oxycodone, Ibuprofen, and Loperamide.    Please contact our office if you have any questions.          _________________________________________  Eric Hardy M.D. Medical Oncologist

## 2017-05-10 NOTE — PROGRESS NOTES
Date of visit: 5/10/2017  8:40 AM      Primary care physician: Dr. Quiñonez  Surgery: Dr. Montgomery  Prior Oncologist: Dr. Orosco, Dr. Cotter  Plastic surgery: Dr. Camacho  Gastroenterology: [facility]  Surgery: Dr. West    Cardiology: Dr. 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 Walters is a 76-year-old female with metastatic rectal carcinoma to liver.  She was originally diagnosed with a rectal carcinoma in 2012 secondary to bleeding. She underwent laparoscopic resection with primary anastomosis and final pathology was consistent with well-differentiated adenocarcinoma with 0/30 nodes. She was staged pT2 N0. 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     8/2015 she was diagnosed with metastatic disease to the liver with a 2.4 cm mass as well as small pulmonary nodules and thyroid nodules. She underwent an ablative therapy to the liver. She was then started on Xelox. She had a little allergic reaction to oxaliplatin and was continued on Xeloda. This eventually led to increased diarrhea and her regimen was changed to single agent 5-FU. She has been tolerating the therapy well with good responses on imaging. More recently was found to have mild elevation of her CEA levels. She also had some skin toxicity which was minimal. She has been having swelling and tenderness of the right hand of unclear etiology with negative x-ray and ultrasound.     3/2017- CT of the chest abdomen pelvis which has been stable and no evidence of disease. CT of the wrist was consistent with tenosynovitis and degenerative changes.    She recently had an EGD and colonoscopy which did not reveal any local recurrence. She had some gastritis. She is feeling  at her baseline. She is planning to go for a cruise. Her last CEA has trended up to 13  Past Medical History:      Past Medical History   Diagnosis Date   • Blood transfusion 1957   • Hypercholesteremia    • Dental disorder      dentures UPPERS AND LOWERS   • Hypertension    • Cancer (CMS-HCC) 09/2012     rectal cancer   • Obstruction      ileostomy   • Other specified symptom associated with female genital organs      HYSTERECTOMY 1993   • CATARACT      removed b/l   • Seasonal allergies    • Cold      cough   • Ileostomy in place (CMS-HCC)    • Fall    • Indigestion    • Arrhythmia    • Lightheadedness 9/17/2015   • Routine general medical examination at a health care facility 3/14/2016     3/14/16   • Renal insufficiency 3/14/2016   • Chemotherapy-induced neutropenia (CMS-HCC) 9/28/2016       Past surgical history:       Past Surgical History   Procedure Laterality Date   • Tonsillectomy     • Abdominal hysterectomy total  1983   • Eye surgery       cataracts   • Cyst excision  1972     ovarian   • Other  2009     left eye torn retina repair   • Colonoscopy flex w/endo us  9/20/2012     Performed by Harshil Bolton M.D. at SURGERY [facility]   • Colectomy  9/26/2012     Performed by Kolton Montgomery M.D. at SURGERY [facility]   • Laparoscopy  10/8/2012     Performed by Kolton Montgomery M.D. at SURGERY [facility]   • Ileo loop diversion  10/8/2012     Performed by Kolton Montgomery M.D. at [facility]   • Sigmoidoscopy  2/27/2013     Performed by Kolton Montgomery M.D. at SURGERY [facility]   • Fistula in ano repair  2/27/2013     Performed by Kolton Montgomery M.D. at SURGERY [facility]   • Proctoscopy  7/17/2013     Performed by Kolton Montgomery M.D. at [facility]   • Biopsy general  7/17/2013     Performed by Kolton Montgomery M.D. at [facility]   • Recovery  8/26/2013     Performed by Cath-Recovery Surgery at [facility] SAME DAY [facility]   • Fistula in ano repair   9/4/2013     Performed by Kolton Montgomery M.D. at [facility]   • Flap rotation  9/4/2013     Performed by Kolton Montgomery M.D. at [facility]   • Irrigation & debridement general  10/23/2013     Performed by Kolton Montgomery M.D. at [facility]   • Perineal procedure  12/18/2013     Performed by Kolton Montgomery M.D. at [facility]   • Myocutaneous flap  12/18/2013     Performed by Kolton Montgomery M.D. at [facility]   • Cataract extraction with iol  2004     bilateral   • Irrigation & debridement general  2/19/2014     Performed by Kolton Montgomery M.D. at [facility]   • Flap graft  2/19/2014     Performed by Kolton Montgomery M.D. at [facility]   • Fistula in ano repair  10/15/2014     Performed by Kolton Montgomery M.D. at [facility]   • Perineal procedure  10/15/2014     Performed by Kolton Montgomery M.D. at [facility]   • Fistula in ano repair  1/28/2015     Performed by Kolton Montgomery M.D. at [facility]   • Perineal procedure  1/28/2015     Performed by Kolton Montgomery M.D. at [facility]   • Hepatic ablation laparoscopic  7/6/2015     Procedure: HEPATIC ABLATION LAPAROSCOPIC.;  Surgeon: Kit West M.D.;  Location: SURGERY [facility];  Service:    • Cath placement Left 7/6/2015     Procedure: CATH PLACEMENT CEPHALIC POWERPORT;  Surgeon: Kit West M.D.;  Location: SURGERY [facility];  Service:    • Colonoscopy with biopsy  8/23/2015     Procedure: COLONOSCOPY WITH BIOPSY;  Surgeon: Wilbur Glasgow M.D.;  Location: ENDOSCOPY [facility]  Service:        Allergies:       Codeine; Oxaliplatin; Pcn; Sulfa drugs; and Tape    Medications:         Current Outpatient Prescriptions   Medication Sig Dispense Refill   • rivaroxaban (XARELTO) 20 MG Tab tablet Take 1 Tab by mouth with dinner. 30 Tab 6   • ranitidine (ZANTAC) 150 MG Tab Take 1 Tab by mouth 2 Times a Day. 60  Tab 3   • famotidine (PEPCID) 40 MG Tab Take 1 Tab by mouth every day. 90 Tab 2   • cyanocobalamin (VITAMIN B-12) 500 MCG Tab Take 500 mcg by mouth every day.     • Multiple Vitamins-Minerals (CENTRUM SILVER PO) Take  by mouth.     • Cholecalciferol (VITAMIN D3) 400 UNITS CAPS Take 1 Cap by mouth every day.     • loratadine (CLARITIN) 10 MG TABS Take 10 mg by mouth every day. Indications: Hayfever     • furosemide (LASIX) 20 MG Tab Take 1 Tab by mouth every day. PRN EDEMA 30 Tab 3   • potassium chloride ER (KLOR-CON) 10 MEQ tablet Take 1 Tab by mouth every day. 30 Tab 3   • lidocaine-prilocaine (EMLA) 2.5-2.5 % Cream APPLY A THICK FILM OVER THE PORT ACCESS SITE PRIOR TO ACCESS 30 g 2   • loperamide (IMODIUM) 1 MG/5ML Liquid Take 2 mg by mouth 4 times a day as needed for Diarrhea.     • ondansetron (ZOFRAN) 4 MG Tab tablet      • ibuprofen (MOTRIN) 200 MG Tab Take 200 mg by mouth every 6 hours as needed.     • oxycodone immediate-release (ROXICODONE) 5 MG Tab Take 5 mg by mouth every four hours as needed for Severe Pain.       No current facility-administered medications for this visit.         Social History:     Social History     Social History   • Marital Status:      Spouse Name: N/A   • Number of Children: N/A   • Years of Education: N/A     Occupational History   • Not on file.     Social History Main Topics   • Smoking status: Never Smoker    • Smokeless tobacco: Never Used   • Alcohol Use: No      Comment: rare   • Drug Use: No   • Sexual Activity: No     Other Topics Concern   • Not on file     Social History Narrative       Family History:      Family History   Problem Relation Age of Onset   • Heart Disease Mother    • Cancer Maternal Aunt 60     breast ca   • Hypertension     • Lung Disease Brother    • Cancer Brother      prostate cancer       Review of Systems:  All other review of systems are negative except what was mentioned above in the HPI.    Constitutional: Negative for fever, chills,  "weight loss and malaise/fatigue.    HEENT: No new auditory or visual complaints. No sore throat and neck pain.     Respiratory: Negative for cough, sputum production, shortness of breath and wheezing.    Cardiovascular: Negative for chest pain, palpitations, orthopnea and leg swelling.    Gastrointestinal: Negative for heartburn, nausea, vomiting and abdominal pain.    Genitourinary: Negative for dysuria, hematuria    Musculoskeletal: No new arthralgias or myalgias   Skin: Hand foot syndrome  Neurological: Negative for focal weakness and headaches.    Endo/Heme/Allergies: No abnormal bleed/bruise.    Psychiatric/Behavioral: No new depression/anxiety.    Physical Exam:  Vitals: /74 mmHg  Pulse 61  Temp(Src) 36.4 °C (97.5 °F)  Resp 15  Ht 1.66 m (5' 5.35\")  Wt 76.9 kg (169 lb 8.5 oz)  BMI 27.91 kg/m2  SpO2 90%  Breastfeeding? No    General: Not in acute distress, alert and oriented x 3  HEENT: No pallor, icterus. Oropharynx clear.   Neck: Supple, no palpable masses.  Lymph nodes: No palpable cervical, supraclavicular, axillary or inguinal lymphadenopathy.    CVS: regular rate and rhythm, no rubs or gallops  RESP: Clear to auscultate bilaterally, no wheezing or crackles.   ABD: Soft, non tender, non distended, positive bowel sounds, no palpable organomegaly  EXT: No edema or cyanosis  CNS: Alert and oriented x3, No focal deficits.  Skin- MIld HF . She is wearing braces in her right wrist      Labs:   No visits with results within 1 Week(s) from this visit.  Latest known visit with results is:    [facility] on 05/02/2017   Component Date Value Ref Range Status   • WBC 05/02/2017 4.4* 4.8 - 10.8 K/uL Final   • RBC 05/02/2017 3.80* 4.20 - 5.40 M/uL Final   • Hemoglobin 05/02/2017 12.7  12.0 - 16.0 g/dL Final   • Hematocrit 05/02/2017 37.9  37.0 - 47.0 % Final   • MCV 05/02/2017 99.7* 81.4 - 97.8 fL Final   • MCH 05/02/2017 33.4* 27.0 - 33.0 pg Final   • MCHC 05/02/2017 33.5* 33.6 - 35.0 " g/dL Final   • RDW 05/02/2017 55.0* 35.9 - 50.0 fL Final   • Platelet Count 05/02/2017 169  164 - 446 K/uL Final   • MPV 05/02/2017 10.1  9.0 - 12.9 fL Final   • Neutrophils-Polys 05/02/2017 56.40  44.00 - 72.00 % Final   • Lymphocytes 05/02/2017 27.50  22.00 - 41.00 % Final   • Monocytes 05/02/2017 12.00  0.00 - 13.40 % Final   • Eosinophils 05/02/2017 2.50  0.00 - 6.90 % Final   • Basophils 05/02/2017 1.10  0.00 - 1.80 % Final   • Immature Granulocytes 05/02/2017 0.50  0.00 - 0.90 % Final   • Nucleated RBC 05/02/2017 0.00   Final   • Neutrophils (Absolute) 05/02/2017 2.50  2.00 - 7.15 K/uL Final    Includes immature neutrophils, if present.   • Lymphs (Absolute) 05/02/2017 1.22  1.00 - 4.80 K/uL Final   • Monos (Absolute) 05/02/2017 0.53  0.00 - 0.85 K/uL Final   • Eos (Absolute) 05/02/2017 0.11  0.00 - 0.51 K/uL Final   • Baso (Absolute) 05/02/2017 0.05  0.00 - 0.12 K/uL Final   • Immature Granulocytes (abs) 05/02/2017 0.02  0.00 - 0.11 K/uL Final   • NRBC (Absolute) 05/02/2017 0.00   Final   • Sodium 05/02/2017 139  135 - 145 mmol/L Final   • Potassium 05/02/2017 3.7  3.6 - 5.5 mmol/L Final   • Chloride 05/02/2017 110  96 - 112 mmol/L Final   • Co2 05/02/2017 20  20 - 33 mmol/L Final   • Anion Gap 05/02/2017 9.0  0.0 - 11.9 Final   • Glucose 05/02/2017 102* 65 - 99 mg/dL Final   • Bun 05/02/2017 19  8 - 22 mg/dL Final   • Creatinine 05/02/2017 1.18  0.50 - 1.40 mg/dL Final   • Calcium 05/02/2017 9.6  8.5 - 10.5 mg/dL Final   • AST(SGOT) 05/02/2017 21  12 - 45 U/L Final   • ALT(SGPT) 05/02/2017 17  2 - 50 U/L Final   • Alkaline Phosphatase 05/02/2017 98  30 - 99 U/L Final   • Total Bilirubin 05/02/2017 0.7  0.1 - 1.5 mg/dL Final   • Albumin 05/02/2017 4.0  3.2 - 4.9 g/dL Final   • Total Protein 05/02/2017 6.4  6.0 - 8.2 g/dL Final   • Globulin 05/02/2017 2.4  1.9 - 3.5 g/dL Final   • A-G Ratio 05/02/2017 1.7   Final   • Carcinoembryonic Antigen 05/02/2017 13.2* 0.0 - 3.0 ng/mL Final    Comment: Effective  September 17, 2013 the quantitative determination  of Carcinoembryonic Antigen (CEA) will now be performed at  [facility].  The Access CEA paramagnetic  particle chemiluminescent immunoassay is used.  Results  obtained with different test methods or kits cannot be used interchangeably.  Measurement of CEA has been shown to be  clinically relevant in the management of patients with  colorectal, breast, lung, prostatic, pancreatic, and ovarian  carcinomas.  Smokers may have slightly elevated levels of CEA.     • GFR If  05/02/2017 54* >60 mL/min/1.73 m 2 Final   • GFR If Non  05/02/2017 44* >60 mL/min/1.73 m 2 Final             Assessment and Plan:    1. Metastatic rectal cancer, KRAS positive: She is currently on single agent 5-FU and has been tolerating it well. She had a rather indolent course, however, the CEA level has been trending up. Colonoscopy did not reveal any local recurrence. We will schedule a PET scan and see her back. Had a long discussion with her about the need for a PET scan and possible modalities of action depending on the PET findings. She has isolated lung or liver recurrence, we will consider the option of local therapy with resection or ablation. If not, we will add irinotecan with Avastin to the regimen.   2. Right tenosynovitis and degenerative changes: She has mild swelling chest and persistent as well as some tenderness. She is advised to follow-up with order. Continue to monitor.  3. History of pulmonary embolus: He was diagnosed in 8/2015 and was initially treated with Coumadin then changed to Xarelto. She had completed 6 months of therapy but was continued on anticoagulation secondary to underlying malignancy.    4. Sinus tachycardia: Intermittent and has been fairly stable.  5. Anal repair and reconstruction: Patient was seen by Dr. Montgomery and has had multiple surgeries to this area. Further surgery has been deferred. She continues to  follow-up with Dr. Montgomery periodically.  6. Gastritis.-Will follow up on the biopsy results  7. Hand-foot syndrome-grade 1 . She will continue using topicals and moisturizing creams    Complex patient requiring complex decision making. I have extensively reviewed her prior medical records as part of establishing care with me and formulated the plan.          Please note that this dictation was created using voice recognition software. I have made every reasonable attempt to correct obvious errors, but I expect that there are errors of grammar and possibly content that I did not discover before finalizing the note.      SIGNATURES:  Eric Hardy    CC:  Manan Quiñonez M.D.  No ref. provider found

## 2017-05-12 ENCOUNTER — OFFICE VISIT (OUTPATIENT)
Dept: MEDICAL GROUP | Facility: MEDICAL CENTER | Age: 77
End: 2017-05-12
Payer: MEDICARE

## 2017-05-12 VITALS
OXYGEN SATURATION: 100 % | HEIGHT: 64 IN | TEMPERATURE: 96.8 F | SYSTOLIC BLOOD PRESSURE: 110 MMHG | DIASTOLIC BLOOD PRESSURE: 80 MMHG | RESPIRATION RATE: 16 BRPM | BODY MASS INDEX: 28.68 KG/M2 | WEIGHT: 168 LBS | HEART RATE: 82 BPM

## 2017-05-12 DIAGNOSIS — C18.9 MALIGNANT NEOPLASM OF COLON, UNSPECIFIED PART OF COLON (HCC): ICD-10-CM

## 2017-05-12 DIAGNOSIS — R94.4 DECREASED GFR: ICD-10-CM

## 2017-05-12 DIAGNOSIS — E78.5 HYPERLIPIDEMIA, UNSPECIFIED HYPERLIPIDEMIA TYPE: ICD-10-CM

## 2017-05-12 DIAGNOSIS — Z00.00 ROUTINE GENERAL MEDICAL EXAMINATION AT A HEALTH CARE FACILITY: ICD-10-CM

## 2017-05-12 DIAGNOSIS — R73.9 HYPERGLYCEMIA: ICD-10-CM

## 2017-05-12 DIAGNOSIS — D70.1 CHEMOTHERAPY-INDUCED NEUTROPENIA (HCC): ICD-10-CM

## 2017-05-12 DIAGNOSIS — C20 RECTAL CANCER (HCC): ICD-10-CM

## 2017-05-12 DIAGNOSIS — N82.4 DIGESTIVE-GENITAL TRACT FISTULA, FEMALE: ICD-10-CM

## 2017-05-12 DIAGNOSIS — I10 ESSENTIAL HYPERTENSION: ICD-10-CM

## 2017-05-12 DIAGNOSIS — E86.0 DEHYDRATION: ICD-10-CM

## 2017-05-12 DIAGNOSIS — N28.9 RENAL INSUFFICIENCY: ICD-10-CM

## 2017-05-12 DIAGNOSIS — N95.9 UNSPECIFIED MENOPAUSAL AND POSTMENOPAUSAL DISORDER: ICD-10-CM

## 2017-05-12 DIAGNOSIS — R19.7 DIARRHEA, UNSPECIFIED TYPE: ICD-10-CM

## 2017-05-12 DIAGNOSIS — T45.1X5A CHEMOTHERAPY-INDUCED NEUTROPENIA (HCC): ICD-10-CM

## 2017-05-12 DIAGNOSIS — C78.7 SECONDARY MALIGNANT NEOPLASM OF LIVER (HCC): ICD-10-CM

## 2017-05-12 DIAGNOSIS — I26.99 OTHER PULMONARY EMBOLISM WITHOUT ACUTE COR PULMONALE, UNSPECIFIED CHRONICITY (HCC): ICD-10-CM

## 2017-05-12 DIAGNOSIS — D64.9 ANEMIA, UNSPECIFIED TYPE: ICD-10-CM

## 2017-05-12 PROCEDURE — 1036F TOBACCO NON-USER: CPT | Performed by: INTERNAL MEDICINE

## 2017-05-12 PROCEDURE — G0439 PPPS, SUBSEQ VISIT: HCPCS | Mod: 25 | Performed by: INTERNAL MEDICINE

## 2017-05-12 ASSESSMENT — PATIENT HEALTH QUESTIONNAIRE - PHQ9: CLINICAL INTERPRETATION OF PHQ2 SCORE: 0

## 2017-05-12 ASSESSMENT — PAIN SCALES - GENERAL: PAINLEVEL: 4=SLIGHT-MODERATE PAIN

## 2017-05-12 NOTE — MR AVS SNAPSHOT
"        Karlene Gilbert Romeo   2017 11:00 AM   Office Visit   MRN: [id number]    Department:  [address] [place] [address]   Dept Phone:  [phone]    Description:  Female : 1940   Provider:  Manan Quiñonez M.D.; HEALTH            Reason for Visit     Annual Wellness Visit           Allergies as of 2017     Allergen Noted Reactions    Codeine 2011       \"gets drunk\"    Oxaliplatin 10/27/2015   Anaphylaxis    Pcn [Penicillins] 2011   Itching    Sulfa Drugs 2011   Itching    Tape 2013   Rash    PAPER TAPE OK      You were diagnosed with     Routine general medical examination at a health care facility   [V70.0.ICD-9-CM]       Unspecified menopausal and postmenopausal disorder   [627.9.ICD-9-CM]       Anemia, unspecified type   [7989320]       Chemotherapy-induced neutropenia (CMS-HCC)   [358043]       Malignant neoplasm of colon, unspecified part of colon (CMS-HCC)   [9752515]       Decreased GFR   [691103]       Digestive-genital tract fistula, female   [619.1.ICD-9-CM]       Hyperglycemia   [671480]       Hyperlipidemia, unspecified hyperlipidemia type   [3689251]       Essential hypertension   [1061471]       Rectal cancer (CMS-HCC)   [223799]       Renal insufficiency   [372492]       Secondary malignant neoplasm of liver (CMS-HCC)   [197.7.ICD-9-CM]         Vital Signs     Blood Pressure Pulse Temperature Respirations Height Weight    110/80 mmHg 82 36 °C (96.8 °F) 16 1.638 m (5' 4.49\") 76.204 kg (168 lb)    Body Mass Index Oxygen Saturation Smoking Status             28.40 kg/m2 100% Never Smoker          Basic Information     Date Of Birth Sex Race Ethnicity Preferred Language    1940 Female White Non- English      Your appointments     May 22, 2017  8:30 AM   EST Port Flush / Central Line Care with INFUSION QUICK INJECT   Infusion Services ([facility])    [address] [facility] [address]  [place] [address]   [phone]            May 22, 2017  9:30 AM   CT PET60 with " [address]   IMAGING [address])    [address]  [facility] [address]   [phone]            May 23, 2017  9:00 AM   Lab Draw with RN 2   Infusion Services ([facility])    [address] [facility] [address]  [facility] [address]   [phone]            May 23, 2017 10:00 AM   ONCOLOGY EST PATIENT 30 MIN with Ella De, A.P.N.   [facility] (--)    [address], [address]  [facility] [address]   [phone]            May 23, 2017 10:30 AM   Est Chemo 3 with RN 6   Infusion Services ([facility])    [id number]  [facility] [address]   [phone]            May 25, 2017  1:30 PM   EST Port Flush / Central Line Care with INFUSION QUICK INJECT   Infusion Services ([facility])    [address]  [facility] [address]   [phone]            Jun 06, 2017  9:00 AM   Lab Draw with INFUSION QUICK INJECT   Infusion [facility] ([facility])    [address]  [place] [address]   [phone]            Jun 06, 2017 10:00 AM   ONCOLOGY EST PATIENT 30 MIN with Eric Hardy M.D.   [facility] (--)    [address], [address]  [facility] [address]   [phone]            Jun 06, 2017 11:00 AM   Est Chemo 3 with RN 2   Infusion [facility] ([facility])    [address] [facility] [address]  [facility] [address]   [phone]            Jun 08, 2017 11:30 AM   EST Port Flush / Central Line Care with INFUSION QUICK INJECT   Infusion [facility] ([facility])    [address]  [place] [address]   [phone]            Jun 20, 2017  8:30 AM   Lab Draw with INFUSION QUICK INJECT   Infusion [facility] ([facility])    [address]  [place] [address]   [phone]            Jun 20, 2017  9:00 AM   ONCOLOGY EST PATIENT 30 MIN with Ella De, A.P.N.   [facility] (--)    [address]  [place] [address]   [phone]            Jun 20, 2017 10:00 AM   Est Chemo 3 with RN 2   Infusion [facility] ([facility])    [address] [facility] [address]  [place] [address]     [phone]            Jun 22, 2017 11:30 AM   EST Port Flush / Central Line Care with INFUSION QUICK INJECT   Infusion Services ([facility])    [address] [facility] [address]  [place] [address]   [phone]            Jul 04, 2017  9:30 AM   Est Chemo 3 with RN 5   Infusion [facility] ([facility])    [address] [facility] [address]  [place] [address]   [phone]            Jul 06, 2017 11:00 AM   EST Port Flush / Central Line Care with INFUSION QUICK INJECT   Infusion [facility] ([facility])    [address] [facility] [address]  [place] [address]   [phone]            Jul 18, 2017  8:00 AM   Lab Draw with INFUSION QUICK INJECT   Infusion [facility] ([facility])    [address]  [place] [address]   [phone]            Jul 18, 2017  8:40 AM   ONCOLOGY EST PATIENT 30 MIN with Eric Hardy M.D.   [facility] (--)    [address]  [place] [address]   [phone]            Jul 18, 2017  9:30 AM   Est Chemo 3 with RN 3   Infusion Services ([facility])    [address]  [place] [address]   [phone]            Jul 19, 2017 10:30 AM   FOLLOW UP with Ahsan Haque M.D.   [facility] (--)    [address]  [place] [address]   [phone]            Jul 20, 2017 11:30 AM   EST Port Flush / Central Line Care with INFUSION QUICK INJECT   Infusion Services ([facility])    [address] [facility] [address]  [place] [address]   [phone]            Nov 13, 2017  9:40 AM   Established Patient with Manan Quiñonez M.D.   [facility] [address]    [address] [facility] [address]  [place] [address]   [phone]           You will be receiving a confirmation call a few days before your appointment from our automated call confirmation system.              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   10/15/2014 - Present    Thyroid nodule E04.1   12/19/2014 - Present    Secondary malignant neoplasm of liver (CMS-HCC) C78.7   7/6/2015 - Present    Dehydration E86.0   8/14/2015 - Present    Nausea & vomiting R11.2 Medium  8/15/2015 - Present    Colon cancer (CMS-HCC) C18.9   8/16/2015 - Present    Pulmonary embolism (CMS-HCC) I26.99 High  8/18/2015 - Present    Ileitis K52.9   8/24/2015 - Present    Diarrhea R19.7   8/24/2015 - Present    Hyperlipidemia E78.5   9/17/2015 - Present    Lightheadedness R42   9/17/2015 - Present    Shortness of breath R06.02 High  10/5/2015 - Present    Routine general medical examination at a health care facility Z00.00   3/14/2016 - Present    Renal insufficiency N28.9   3/14/2016 - Present    Hyperglycemia R73.9   3/14/2016 - Present    Decreased GFR R94.4   8/16/2016 - Present    Chemotherapy-induced neutropenia (CMS-HCC) D70.1   9/28/2016 - Present      Health Maintenance        Date Due Completion Dates    BONE DENSITY 4/13/2005 ---    IMM PNEUMOCOCCAL 65+ (ADULT) LOW/MEDIUM RISK SERIES (2 of 2 - PCV13) 5/3/2018 (Originally 9/5/2007) 9/5/2006    IMM DTaP/Tdap/Td Vaccine (1 - Tdap) 5/3/2018 (Originally 4/13/1959) ---    COLONOSCOPY 8/23/2025 8/23/2015, 9/20/2012            Current Immunizations     Influenza TIV (IM) 9/5/2013  6:12 AM, 10/12/2012  6:09 PM    Influenza Vaccine Quad Inj (Preserved) 10/18/2014    Pneumococcal polysaccharide vaccine (PPSV-23) 9/5/2006    SHINGLES VACCINE 1/14/2015      Below and/or attached are the medications your provider expects you to take. Review all of your  home medications and newly ordered medications with your provider and/or pharmacist. Follow medication instructions as directed by your provider and/or pharmacist. Please keep your medication list with you and share with your provider. Update the information when medications are discontinued, doses are changed, or new medications (including over-the-counter products) are added; and carry medication information at all times in the event of emergency situations     Allergies:  CODEINE - (reactions not documented)     OXALIPLATIN - Anaphylaxis     PCN - Itching     SULFA DRUGS - Itching     TAPE - Rash               Medications  Valid as of: May 12, 2017 - 12:00 PM    Generic Name Brand Name Tablet Size Instructions for use    Cholecalciferol (Cap) Vitamin D3 400 UNITS Take 1 Cap by mouth every day.        Cyanocobalamin (Tab) VITAMIN B-12 500 MCG Take 500 mcg by mouth every day.        Famotidine (Tab) PEPCID 40 MG Take 1 Tab by mouth every day.        Furosemide (Tab) LASIX 20 MG Take 1 Tab by mouth every day. PRN EDEMA        Ibuprofen (Tab) MOTRIN 200 MG Take 200 mg by mouth every 6 hours as needed.        Lidocaine-Prilocaine (Cream) EMLA 2.5-2.5 % APPLY A THICK FILM OVER THE PORT ACCESS SITE PRIOR TO ACCESS        Loperamide HCl (Liquid) IMODIUM 1 MG/5ML Take 2 mg by mouth 4 times a day as needed for Diarrhea.        Loratadine (Tab) CLARITIN 10 MG Take 10 mg by mouth every day. Indications: Hayfever        Multiple Vitamins-Minerals   Take  by mouth.        Ondansetron HCl (Tab) ZOFRAN 4 MG         OxyCODONE HCl (Tab) ROXICODONE 5 MG Take 5 mg by mouth every four hours as needed for Severe Pain.        Potassium Chloride (Tab CR) KLOR-CON 10 MEQ Take 1 Tab by mouth every day.        RaNITidine HCl (Tab) ZANTAC 150 MG Take 1 Tab by mouth 2 Times a Day.        Rivaroxaban (Tab) XARELTO 20 MG Take 1 Tab by mouth with dinner.        .                 Medicines prescribed today were sent to:     [facility]  [facility] [place], [place] - [address]    [address] [facility] [place] [address]    Phone: [phone] Fax: [phone]    Open 24 Hours?: No      Medication refill instructions:       If your prescription bottle indicates you have medication refills left, it is not necessary to call your provider’s office. Please contact your pharmacy and they will refill your medication.    If your prescription bottle indicates you do not have any refills left, you may request refills at any time through one of the following ways: The online [facility] system (except Urgent Care), by calling your provider’s office, or by asking your pharmacy to contact your provider’s office with a refill request. Medication refills are processed only during regular business hours and may not be available until the next business day. Your provider may request additional information or to have a follow-up visit with you prior to refilling your medication.   *Please Note: Medication refills are assigned a new Rx number when refilled electronically. Your pharmacy may indicate that no refills were authorized even though a new prescription for the same medication is available at the pharmacy. Please request the medicine by name with the pharmacy before contacting your provider for a refill.        Your To Do List     Future Labs/Procedures Complete By Expires    DS-BONE DENSITY STUDY (DEXA)  As directed 11/12/2017         [facility] Status: Patient Declined

## 2017-05-12 NOTE — ASSESSMENT & PLAN NOTE
She has a history of hyperlipidemia with elevated triglycerides and decreased HDL. She's not had a recent lipid panel. We took this occasion to briefly review appropriate diet.

## 2017-05-12 NOTE — ASSESSMENT & PLAN NOTE
Rectal cancer has been treated with resection as well as chemotherapy. Liver metastasis has been ablated. Patient feels well. Her CEA however is rising and is up to 13.2.

## 2017-05-12 NOTE — PROGRESS NOTES
Chief Complaint   Patient presents with   • Annual Wellness Visit         HPI:  Karlene Walters is a 77 y.o. female here for Medicare Annual Wellness Visit. She is accompanied by her .    Patient Active Problem List    Diagnosis Date Noted   • Shortness of breath 10/05/2015     Priority: High   • Pulmonary embolism (CMS-HCC) 08/18/2015     Priority: High   • Abnormal EKG 08/12/2013     Priority: High   • Nausea & vomiting 08/15/2015     Priority: Medium   • Hypertension 10/01/2012     Priority: Medium   • Chemotherapy-induced neutropenia (CMS-HCC) 09/28/2016   • Decreased GFR 08/16/2016   • Routine general medical examination at a health care facility 03/14/2016   • Renal insufficiency 03/14/2016   • Hyperglycemia 03/14/2016   • Hyperlipidemia 09/17/2015   • Lightheadedness 09/17/2015   • Ileitis 08/24/2015   • Diarrhea 08/24/2015   • Colon cancer (CMS-HCC) 08/16/2015   • Dehydration 08/14/2015   • Secondary malignant neoplasm of liver (CMS-HCC) 07/06/2015   • Thyroid nodule 12/19/2014   • 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 PRN EDEMA 30 Tab 3   • potassium chloride ER (KLOR-CON) 10 MEQ tablet Take 1 Tab by mouth every day. 30 Tab 3   • rivaroxaban (XARELTO) 20 MG Tab tablet Take 1 Tab by mouth with dinner. 30 Tab 6   • ranitidine (ZANTAC) 150 MG Tab Take 1 Tab by mouth 2 Times a Day. 60 Tab 3   • famotidine (PEPCID) 40 MG Tab Take 1 Tab by mouth every day. 90 Tab 2   • lidocaine-prilocaine (EMLA) 2.5-2.5 % Cream APPLY A THICK FILM OVER THE PORT ACCESS SITE PRIOR TO ACCESS 30 g 2   • loperamide (IMODIUM) 1 MG/5ML Liquid Take 2  mg by mouth 4 times a day as needed for Diarrhea.     • ondansetron (ZOFRAN) 4 MG Tab tablet      • ibuprofen (MOTRIN) 200 MG Tab Take 200 mg by mouth every 6 hours as needed.     • oxycodone immediate-release (ROXICODONE) 5 MG Tab Take 5 mg by mouth every four hours as needed for Severe Pain.     • cyanocobalamin (VITAMIN B-12) 500 MCG Tab Take 500 mcg by mouth every day.     • Multiple Vitamins-Minerals (CENTRUM SILVER PO) Take  by mouth.     • Cholecalciferol (VITAMIN D3) 400 UNITS CAPS Take 1 Cap by mouth every day.     • loratadine (CLARITIN) 10 MG TABS Take 10 mg by mouth every day. Indications: Hayfever       No current facility-administered medications for this visit.        Patient is taking medications as noted in medication list.  Current supplements as per medication list.   Chronic narcotic pain medicines: yes    Allergies: Codeine; Oxaliplatin; Pcn; Sulfa drugs; and Tape    Current social contact/activities: Visit with family and friends, activities with senior center, Walking daily for 20-30 minutes      Is patient current with immunizations?  No, due for HEPATITIS A , HEPATITIS B, PREVNAR (PCV13)  and TDAP. Patient is interested in receiving NONE today.     She  reports that she has never smoked. She has never used smokeless tobacco. She reports that she does not drink alcohol or use illicit drugs.  Counseling given: Yes        DPA/Advanced Directive:  Patient does not have an Advanced Directive.  A packet and workshop information was given on Advanced Directives.    ROS:    Gait: Uses no assistive device   Ostomy: yes   Other tubes: no   Amputations: no   Chronic oxygen use: no   Last eye exam: 1 year   Wears hearing aids: no   : Reports incontinence. She denies significant headaches or lightheadedness or change in vision or hearing or swallowing. She denies dyspnea or wheezing or coughing or chest pain or palpitations or indigestion. She denies any urinary complaints. She has only occasional mild  ankle swelling.    Depression Screening    Little interest or pleasure in doing things?  0 - not at all  Feeling down, depressed, or hopeless?  0 - not at all  Patient Health Questionnaire Score: 0  If depressive symptoms identified deferred to follow up visit unless specifically addressed in assessment and plan.    Interpretation of PHQ-9 Total Score   Score Severity   1-4 Minimal Depression   5-9 Mild Depression   10-14 Moderate Depression   15-19 Moderately Severe Depression   20-27 Severe Depression    Screening for Cognitive Impairment    Three Minute Recall (banana, sunrise, fence)  2/3    Draw clock face with all 12 numbers set to the hand to show 10 minutes past 11 o'clock  1 5/5  If cognitive concerns identified deferred to follow up visit unless specifically addressed in assessment and plan.    Fall Risk Assessment    Has the patient had two or more falls in the last year or any fall with injury in the last year?  No  If Fall Risk identified deferred to follow up visit unless specifically addressed in assessment and plan.    Safety Assessment    Throw rugs on floor.  Yes  Handrails on all stairs.  Yes  Good lighting in all hallways.  Yes  Difficulty hearing.  No  Patient counseled about all safety risks that were identified.    Functional Assessment ADLs    Are there any barriers preventing you from cooking for yourself or meeting nutritional needs?  No.    Are there any barriers preventing you from driving safely or obtaining transportation?  No.    Are there any barriers preventing you from using a telephone or calling for help?  No.    Are there any barriers preventing you from shopping?  No.    Are there any barriers preventing you from taking care of your own finances?  No.    Are there any barriers preventing you from managing your medications?  No.    Are currently engaging any exercise or physical activity?  Yes.       Health Maintenance Summary                BONE DENSITY Overdue 4/13/2005      Annual Wellness Visit Overdue 3/15/2017      Not specified 3/14/2016     IMM PNEUMOCOCCAL 65+ (ADULT) LOW/MEDIUM RISK SERIES Postponed 5/3/2018 Originally 9/5/2007. Provider advises postponing for medical reasons     Done 9/5/2006 Imm Admin: Pneumococcal polysaccharide vaccine (PPSV-23)    IMM DTaP/Tdap/Td Vaccine Postponed 5/3/2018 Originally 4/13/1959. Provider advises postponing for medical reasons    COLONOSCOPY Next Due 8/23/2025      Done 8/23/2015 Surg:COLONOSCOPY WITH BIOPSY     Patient has more history with this topic...          Patient Care Team:  Manan Quiñonez M.D. as PCP - General (Internal Medicine)  Radha Cotter M.D. as Consulting Physician (Oncology)  Kit West M.D. as Consulting Physician (Surgery)  ARTURO Arteaga as Consulting Physician (Oncology)  Kolton Montgomery M.D. as Consulting Physician (Surgery)  Ahsan Haque M.D. as Consulting Physician (Cardiology)    Social History   Substance Use Topics   • Smoking status: Never Smoker    • Smokeless tobacco: Never Used   • Alcohol Use: No      Comment: rare     Family History   Problem Relation Age of Onset   • Heart Disease Mother    • Heart Failure Mother    • Hypertension Mother    • Hyperlipidemia Mother    • Cancer Mother    • Cancer Maternal Aunt 60     breast ca   • Lung Disease Brother      COPD/Emphysema/Smoker   • Cancer Brother      prostate cancer   • Alcohol/Drug Brother      Alcohol   • Kidney Disease Father      renal failure     She  has a past medical history of Blood transfusion (1957); Hypercholesteremia; Dental disorder; Hypertension; Cancer (CMS-HCC) (09/2012); Obstruction; Other specified symptom associated with female genital organs; CATARACT; Seasonal allergies; Cold (); Ileostomy in place (CMS-HCC); Fall; Indigestion; Arrhythmia; Lightheadedness (9/17/2015); Routine general medical examination at a health care facility (3/14/2016); Renal insufficiency (3/14/2016); and  Chemotherapy-induced neutropenia (CMS-HCC) (9/28/2016).   Past Surgical History   Procedure Laterality Date   • Tonsillectomy     • Abdominal hysterectomy total  1983   • Eye surgery       cataracts   • Cyst excision  1972     ovarian   • Other  2009     left eye torn retina repair   • Colonoscopy flex w/endo us  9/20/2012     Performed by Harshil Bolton M.D. at SURGERY [facility]   • Colectomy  9/26/2012     Performed by Kloton Montgomery M.D. at SURGERY [facility]   • Laparoscopy  10/8/2012     Performed by Kolton Montgomery M.D. at [facility]   • Ileo loop diversion  10/8/2012     Performed by Kolton Montgomery M.D. at [facility]   • Sigmoidoscopy  2/27/2013     Performed by Kolton Montgomery M.D. at [facility]   • Fistula in ano repair  2/27/2013     Performed by Kolton Montgomery M.D. at [facility]   • Proctoscopy  7/17/2013     Performed by Kolton Montgomery M.D. at [facility]   • Biopsy general  7/17/2013     Performed by Kolton Montgomery M.D. at [facility]   • Recovery  8/26/2013     Performed by Cath-Recovery Surgery at [facility] SAME DAY [facility]   • Fistula in ano repair  9/4/2013     Performed by Kolton Montgomery M.D. at [facility]   • Flap rotation  9/4/2013     Performed by Kolton Montgomery M.D. at [facility]   • Irrigation & debridement general  10/23/2013     Performed by Kolton Montgomery M.D. at [facility]   • Perineal procedure  12/18/2013     Performed by Kolton Montgomery M.D. at [facility]   • Myocutaneous flap  12/18/2013     Performed by Kolton Montgomery M.D. at [facility]   • Cataract extraction with iol  2004     bilateral   • Irrigation & debridement general  2/19/2014     Performed by Kolton Montgomery M.D. at [facility]   • Flap graft  2/19/2014     Performed by Kolton Montgomery M.D. at SURGERY [facility]   • Fistula in ano repair  10/15/2014     Performed by Kolton DESAI  "MARIANA Montgomery at SURGERY [facility]   • Perineal procedure  10/15/2014     Performed by Kolton Montgomery M.D. at SURGERY [facility]   • Fistula in ano repair  1/28/2015     Performed by Kolton Montgomery M.D. at [facility]   • Perineal procedure  1/28/2015     Performed by Kolton Montgomery M.D. at SURGERY [facility]   • Hepatic ablation laparoscopic  7/6/2015     Procedure: HEPATIC ABLATION LAPAROSCOPIC.;  Surgeon: Kit West M.D.;  Location: SURGERY [facility];  Service:    • Cath placement Left 7/6/2015     Procedure: CATH PLACEMENT CEPHALIC POWERPORT;  Surgeon: Kit West M.D.;  Location: SURGERY [facility];  Service:    • Colonoscopy with biopsy  8/23/2015     Procedure: COLONOSCOPY WITH BIOPSY;  Surgeon: Wilbur Glasgow M.D.;  Location: ENDOSCOPY [facility];  Service:            Exam:     Blood pressure 110/80, pulse 82, temperature 36 °C (96.8 °F), resp. rate 16, height 1.638 m (5' 4.49\"), weight 76.204 kg (168 lb), SpO2 100 %. Body mass index is 28.4 kg/(m^2).    Hearing excellent.    Dentition upper and lower dentures  Alert, oriented in no acute distress.  Eye contact is good, speech goal directed, affect calm. Neck is supple and without significant lymphadenopathy. Lungs are clear without rales or wheeze. Cardiovascular peripheral circulation is satisfactory. Heart sounds are unremarkable. Abdomen is soft and without masses or tenderness. There are normal bowel sounds. Extremities are without significant edema.      Assessment and Plan. The following treatment and monitoring plan is recommended:    Chemotherapy-induced neutropenia  She continues to receive chemotherapy. Blood counts are being followed serially. She has mild neutropenia.    Dehydration  She does not particularly like drinking water so has difficult time keeping herself well hydrated.    Diarrhea  She has had some problems with diarrhea but none recently.    Hyperglycemia  Blood " sugars only mildly elevated at 102 currently. She does eat some sweets.    Hyperlipidemia  She has a history of hyperlipidemia with elevated triglycerides and decreased HDL. She's not had a recent lipid panel. We took this occasion to briefly review appropriate diet.    Hypertension  Blood pressure currently is under good control. She tries to follow a low-salt diet.    Pulmonary embolism  Prior pulmonary embolism. No recent chest pain or significant exertional dyspnea. She is anticoagulated with Xarelto.    Rectal cancer  Rectal cancer has been treated with resection as well as chemotherapy. Liver metastasis has been ablated. Patient feels well. Her CEA however is rising and is up to 13.2.    Renal insufficiency  She has mild renal insufficiency with GFR 44. As noted, she doesn't particularly like drinking water.    Secondary malignant neoplasm of liver  Liver metastasis has been ablated. Liver function tests are normal.            Services suggested: No services needed at this time  Health Care Screening recommendations as per orders if indicated.  Referrals offered: PT/OT/Nutrition counseling/Behavioral Health/Smoking cessation as per orders if indicated.    Discussion today about general wellness and lifestyle habits:    · Prevent falls and reduce trip hazards; Cautioned about securing or removing rugs.  · Have a working fire alarm and carbon monoxide detector;   · Engage in regular physical activity and social activities       Follow-up: She will return for follow-up in 6 months if not sooner.    Today's visit was for yearly wellness evaluation.

## 2017-05-12 NOTE — ASSESSMENT & PLAN NOTE
She has mild renal insufficiency with GFR 44. As noted, she doesn't particularly like drinking water.

## 2017-05-12 NOTE — ASSESSMENT & PLAN NOTE
Prior pulmonary embolism. No recent chest pain or significant exertional dyspnea. She is anticoagulated with Xarelto.

## 2017-05-12 NOTE — ASSESSMENT & PLAN NOTE
She does not particularly like drinking water so has difficult time keeping herself well hydrated.

## 2017-05-12 NOTE — ASSESSMENT & PLAN NOTE
She continues to receive chemotherapy. Blood counts are being followed serially. She has mild neutropenia.

## 2017-05-22 ENCOUNTER — OUTPATIENT INFUSION SERVICES (OUTPATIENT)
Dept: ONCOLOGY | Facility: MEDICAL CENTER | Age: 77
End: 2017-05-22
Attending: INTERNAL MEDICINE
Payer: MEDICARE

## 2017-05-22 ENCOUNTER — HOSPITAL ENCOUNTER (OUTPATIENT)
Dept: RADIOLOGY | Facility: MEDICAL CENTER | Age: 77
End: 2017-05-22
Attending: INTERNAL MEDICINE
Payer: MEDICARE

## 2017-05-22 VITALS
OXYGEN SATURATION: 100 % | TEMPERATURE: 97.2 F | SYSTOLIC BLOOD PRESSURE: 131 MMHG | DIASTOLIC BLOOD PRESSURE: 63 MMHG | WEIGHT: 166.01 LBS | BODY MASS INDEX: 27.66 KG/M2 | HEIGHT: 65 IN | HEART RATE: 87 BPM | RESPIRATION RATE: 18 BRPM

## 2017-05-22 DIAGNOSIS — C18.9 MALIGNANT NEOPLASM OF COLON, UNSPECIFIED PART OF COLON (HCC): ICD-10-CM

## 2017-05-22 DIAGNOSIS — C78.7 SECONDARY MALIGNANT NEOPLASM OF LIVER (HCC): ICD-10-CM

## 2017-05-22 DIAGNOSIS — C20 RECTAL CANCER (HCC): ICD-10-CM

## 2017-05-22 DIAGNOSIS — R94.4 DECREASED GFR: ICD-10-CM

## 2017-05-22 LAB
ALBUMIN SERPL BCP-MCNC: 3.8 G/DL (ref 3.2–4.9)
ALBUMIN/GLOB SERPL: 1.2 G/DL
ALP SERPL-CCNC: 111 U/L (ref 30–99)
ALT SERPL-CCNC: 24 U/L (ref 2–50)
ANION GAP SERPL CALC-SCNC: 10 MMOL/L (ref 0–11.9)
AST SERPL-CCNC: 28 U/L (ref 12–45)
BASOPHILS # BLD AUTO: 1.1 % (ref 0–1.8)
BASOPHILS # BLD: 0.05 K/UL (ref 0–0.12)
BILIRUB SERPL-MCNC: 0.9 MG/DL (ref 0.1–1.5)
BUN SERPL-MCNC: 21 MG/DL (ref 8–22)
CALCIUM SERPL-MCNC: 9.6 MG/DL (ref 8.5–10.5)
CEA SERPL-MCNC: 12.9 NG/ML (ref 0–3)
CHLORIDE SERPL-SCNC: 101 MMOL/L (ref 96–112)
CO2 SERPL-SCNC: 20 MMOL/L (ref 20–33)
CREAT SERPL-MCNC: 1.06 MG/DL (ref 0.5–1.4)
EOSINOPHIL # BLD AUTO: 0.06 K/UL (ref 0–0.51)
EOSINOPHIL NFR BLD: 1.3 % (ref 0–6.9)
ERYTHROCYTE [DISTWIDTH] IN BLOOD BY AUTOMATED COUNT: 54.1 FL (ref 35.9–50)
GFR SERPL CREATININE-BSD FRML MDRD: 50 ML/MIN/1.73 M 2
GLOBULIN SER CALC-MCNC: 3.1 G/DL (ref 1.9–3.5)
GLUCOSE SERPL-MCNC: 90 MG/DL (ref 65–99)
HCT VFR BLD AUTO: 37.3 % (ref 37–47)
HGB BLD-MCNC: 12.5 G/DL (ref 12–16)
IMM GRANULOCYTES # BLD AUTO: 0.06 K/UL (ref 0–0.11)
IMM GRANULOCYTES NFR BLD AUTO: 1.3 % (ref 0–0.9)
LYMPHOCYTES # BLD AUTO: 0.8 K/UL (ref 1–4.8)
LYMPHOCYTES NFR BLD: 17.6 % (ref 22–41)
MCH RBC QN AUTO: 33.2 PG (ref 27–33)
MCHC RBC AUTO-ENTMCNC: 33.5 G/DL (ref 33.6–35)
MCV RBC AUTO: 99.2 FL (ref 81.4–97.8)
MONOCYTES # BLD AUTO: 0.8 K/UL (ref 0–0.85)
MONOCYTES NFR BLD AUTO: 17.6 % (ref 0–13.4)
NEUTROPHILS # BLD AUTO: 2.78 K/UL (ref 2–7.15)
NEUTROPHILS NFR BLD: 61.1 % (ref 44–72)
NRBC # BLD AUTO: 0 K/UL
NRBC BLD AUTO-RTO: 0 /100 WBC
PLATELET # BLD AUTO: 264 K/UL (ref 164–446)
PMV BLD AUTO: 9.8 FL (ref 9–12.9)
POTASSIUM SERPL-SCNC: 3.8 MMOL/L (ref 3.6–5.5)
PROT SERPL-MCNC: 6.9 G/DL (ref 6–8.2)
RBC # BLD AUTO: 3.76 M/UL (ref 4.2–5.4)
SODIUM SERPL-SCNC: 131 MMOL/L (ref 135–145)
WBC # BLD AUTO: 4.6 K/UL (ref 4.8–10.8)

## 2017-05-22 PROCEDURE — 85025 COMPLETE CBC W/AUTO DIFF WBC: CPT

## 2017-05-22 PROCEDURE — A4212 NON CORING NEEDLE OR STYLET: HCPCS

## 2017-05-22 PROCEDURE — 36591 DRAW BLOOD OFF VENOUS DEVICE: CPT

## 2017-05-22 PROCEDURE — 80053 COMPREHEN METABOLIC PANEL: CPT

## 2017-05-22 PROCEDURE — 82378 CARCINOEMBRYONIC ANTIGEN: CPT

## 2017-05-22 PROCEDURE — 700111 HCHG RX REV CODE 636 W/ 250 OVERRIDE (IP)

## 2017-05-22 RX ADMIN — HEPARIN 500 UNITS: 100 SYRINGE at 08:55

## 2017-05-22 ASSESSMENT — PAIN SCALES - GENERAL: PAINLEVEL: 2=MINIMAL-SLIGHT

## 2017-05-22 NOTE — PROGRESS NOTES
Patient arrived to Infusion for Port access prior to PET scan at 0900.  Pt appeared anxious, would like all labs drawn today, despite it being an odd cycle, as patient states she missed a cycle due to trip out of town.  Previously applied emla cream removed, port accessed in sterile field with Power Loc needle.  Flushed with brisk blood return.  Labs drawn and sent for processing.  Notified scheduling and charge RN that earlier lab appt cancelled for tomorrow.  Pt to go straight to pre-chemo appt tomorrow, then to Infusion for treatment.  Flushed port per protocol, saline capped and locked. Pt discharged with  to PET scan under no apparent distress.

## 2017-05-23 ENCOUNTER — OUTPATIENT INFUSION SERVICES (OUTPATIENT)
Dept: ONCOLOGY | Facility: MEDICAL CENTER | Age: 77
End: 2017-05-23
Attending: INTERNAL MEDICINE
Payer: MEDICARE

## 2017-05-23 ENCOUNTER — OFFICE VISIT (OUTPATIENT)
Dept: HEMATOLOGY ONCOLOGY | Facility: MEDICAL CENTER | Age: 77
End: 2017-05-23
Payer: MEDICARE

## 2017-05-23 ENCOUNTER — NON-PROVIDER VISIT (OUTPATIENT)
Dept: HEMATOLOGY ONCOLOGY | Facility: MEDICAL CENTER | Age: 77
End: 2017-05-23
Payer: MEDICARE

## 2017-05-23 VITALS
OXYGEN SATURATION: 97 % | DIASTOLIC BLOOD PRESSURE: 70 MMHG | HEART RATE: 92 BPM | TEMPERATURE: 98.1 F | SYSTOLIC BLOOD PRESSURE: 116 MMHG | HEIGHT: 65 IN | BODY MASS INDEX: 27.64 KG/M2 | WEIGHT: 165.9 LBS | RESPIRATION RATE: 16 BRPM

## 2017-05-23 VITALS
TEMPERATURE: 98.1 F | SYSTOLIC BLOOD PRESSURE: 116 MMHG | RESPIRATION RATE: 16 BRPM | DIASTOLIC BLOOD PRESSURE: 70 MMHG | HEART RATE: 92 BPM

## 2017-05-23 DIAGNOSIS — C78.7 SECONDARY MALIGNANT NEOPLASM OF LIVER (HCC): ICD-10-CM

## 2017-05-23 DIAGNOSIS — J18.9 LUNG INFECTION: ICD-10-CM

## 2017-05-23 DIAGNOSIS — C20 RECTAL CANCER (HCC): ICD-10-CM

## 2017-05-23 PROCEDURE — G8419 CALC BMI OUT NRM PARAM NOF/U: HCPCS | Performed by: NURSE PRACTITIONER

## 2017-05-23 PROCEDURE — G8432 DEP SCR NOT DOC, RNG: HCPCS | Performed by: NURSE PRACTITIONER

## 2017-05-23 PROCEDURE — 1036F TOBACCO NON-USER: CPT | Performed by: NURSE PRACTITIONER

## 2017-05-23 PROCEDURE — 4040F PNEUMOC VAC/ADMIN/RCVD: CPT | Performed by: NURSE PRACTITIONER

## 2017-05-23 PROCEDURE — 99213 OFFICE O/P EST LOW 20 MIN: CPT | Performed by: NURSE PRACTITIONER

## 2017-05-23 PROCEDURE — 1101F PT FALLS ASSESS-DOCD LE1/YR: CPT | Performed by: NURSE PRACTITIONER

## 2017-05-23 RX ORDER — AZITHROMYCIN 250 MG/1
TABLET, FILM COATED ORAL
Qty: 6 TAB | Refills: 0 | Status: SHIPPED | OUTPATIENT
Start: 2017-05-23 | End: 2017-06-05

## 2017-05-23 ASSESSMENT — ENCOUNTER SYMPTOMS
WHEEZING: 0
VOMITING: 0
NAUSEA: 0
PALPITATIONS: 0
CHILLS: 0
TINGLING: 0
CONSTIPATION: 0
DIARRHEA: 0
SPUTUM PRODUCTION: 1
DIZZINESS: 0
HEMOPTYSIS: 0
SHORTNESS OF BREATH: 1
COUGH: 0
WEIGHT LOSS: 1
HEADACHES: 0
FEVER: 0

## 2017-05-23 ASSESSMENT — PAIN SCALES - GENERAL
PAINLEVEL: NO PAIN
PAINLEVEL: NO PAIN

## 2017-05-23 NOTE — PROGRESS NOTES
Port de-accessed per protocol per orders from [place], APRN.  L chest port site covered with sterile gauze and tape.  Pt tolerated well.

## 2017-05-23 NOTE — PROGRESS NOTES
"Subjective:      Karlene Walters is a 77 y.o. female who presents for Follow-Up for rectal cancer.         HPI    Patient seen today in follow-up for rectal cancer metastatic to liver. Patient is accompanied by her  for today's appointment. She is scheduled to receive single agent 5-FU today.    Patient recently went on an [employer] cruise with her family last week. Patient stated she enjoyed her trip. She denies any fevers, chills, or fatigue. She did lose some weight on her trip as stated she was very active. Patient stated she denies any chest pain, heart palpitations or swelling in her legs. She denies coughing but states that she has been able to produce sputum in the morning and has noticed an increase in shortness of breath when doing more things. I did review her PET/CT which shows likely an acute infection of the lungs which could likely represent bronchitis or pneumonia. Patient denies any nausea, vomiting, diarrhea or constipation. Her output via her ostomy has been stable. She voids without difficulty. She does have pain in her right wrist which she saw orthopedic 4 and was diagnosed with tendinitis. She denies any dizziness, tingling or headaches.    We had a PET/CT which shows recurrent metastatic disease to the liver of the previous treated site. Area of the liver has an SUV of 11 at this time. This may indicate patient's elevation and CEA as well. Also noted on PET/CT new left lower lobe patchy consolidation most likely related to an acute infection. I reviewed the results in detail with the patient and her  today as well as Dr. Hardy review the results.    Allergies   Allergen Reactions   • Codeine      \"gets drunk\"   • Oxaliplatin Anaphylaxis   • Pcn [Penicillins] Itching   • Sulfa Drugs Itching   • Tape Rash     PAPER TAPE OK     Current Outpatient Prescriptions on File Prior to Visit   Medication Sig Dispense Refill   • rivaroxaban (XARELTO) 20 MG Tab tablet Take 1 Tab by mouth " "with dinner. 30 Tab 6   • ranitidine (ZANTAC) 150 MG Tab Take 1 Tab by mouth 2 Times a Day. 60 Tab 3   • famotidine (PEPCID) 40 MG Tab Take 1 Tab by mouth every day. 90 Tab 2   • cyanocobalamin (VITAMIN B-12) 500 MCG Tab Take 500 mcg by mouth every day.     • Multiple Vitamins-Minerals (CENTRUM SILVER PO) Take  by mouth.     • Cholecalciferol (VITAMIN D3) 400 UNITS CAPS Take 1 Cap by mouth every day.     • furosemide (LASIX) 20 MG Tab Take 1 Tab by mouth every day. PRN EDEMA 30 Tab 3   • potassium chloride ER (KLOR-CON) 10 MEQ tablet Take 1 Tab by mouth every day. 30 Tab 3   • lidocaine-prilocaine (EMLA) 2.5-2.5 % Cream APPLY A THICK FILM OVER THE PORT ACCESS SITE PRIOR TO ACCESS 30 g 2   • loperamide (IMODIUM) 1 MG/5ML Liquid Take 2 mg by mouth 4 times a day as needed for Diarrhea.     • ondansetron (ZOFRAN) 4 MG Tab tablet      • ibuprofen (MOTRIN) 200 MG Tab Take 200 mg by mouth every 6 hours as needed.     • oxycodone immediate-release (ROXICODONE) 5 MG Tab Take 5 mg by mouth every four hours as needed for Severe Pain.     • loratadine (CLARITIN) 10 MG TABS Take 10 mg by mouth every day. Indications: Hayfever       No current facility-administered medications on file prior to visit.       Review of Systems   Constitutional: Positive for weight loss (just went to [place] for trip). Negative for fever, chills and malaise/fatigue.   Respiratory: Positive for sputum production (in the morning) and shortness of breath. Negative for cough, hemoptysis and wheezing.    Cardiovascular: Negative for chest pain, palpitations and leg swelling.   Gastrointestinal: Negative for nausea, vomiting, diarrhea and constipation.   Genitourinary: Negative for dysuria.   Musculoskeletal: Positive for joint pain (right wrist).   Neurological: Negative for dizziness, tingling and headaches.          Objective:     /70 mmHg  Pulse 92  Temp(Src) 36.7 °C (98.1 °F)  Resp 16  Ht 1.657 m (5' 5.25\")  Wt 75.25 kg (165 lb 14.3 oz)  " BMI 27.41 kg/m2  SpO2 97%  Breastfeeding? No     Physical Exam   Constitutional: She is oriented to person, place, and time. She appears well-developed and well-nourished.   HENT:   Head: Normocephalic and atraumatic.   Mouth/Throat: Oropharynx is clear and moist. No oropharyngeal exudate.   Eyes: Conjunctivae and EOM are normal. Pupils are equal, round, and reactive to light.   Cardiovascular: Normal rate, regular rhythm and normal heart sounds.  Exam reveals no gallop and no friction rub.    No murmur heard.  Pulmonary/Chest: Effort normal and breath sounds normal. No respiratory distress. She has no wheezes.   Abdominal: Soft. Bowel sounds are normal. She exhibits no distension. There is no tenderness.   Musculoskeletal: Normal range of motion. She exhibits tenderness (tenderness to the right breast. Patient wears a brace.). She exhibits no edema.   Neurological: She is alert and oriented to person, place, and time.   Skin: Skin is warm and dry. No rash noted. No erythema. No pallor.   Psychiatric: She has a normal mood and affect. Her behavior is normal.       [id number] Base To Mid-thigh    5/22/2017 5/22/2017 9:20 AM HISTORY/REASON FOR EXAM:  Malignant neoplasm of colon, unspecified part of colon (CMS-HCC), restaging TECHNIQUE/EXAM DESCRIPTION AND NUMBER OF VIEWS: PET body imaging. Initially, 15.5 mCi F-18 FDG was administered intravenously under standardized conditions. Approximately 45 minutes after FDG administration, the patient was placed in the supine position on the PET CT table. Blood glucose level was 76 mg/dL. Low dose spiral CT imaging was performed from the skull base to the mid thighs. PET imaging was then performed from the skull base to the mid thighs. CT images, PET images, and PET/CT fused images were reviewed on a PACS 3D workstation. The limited non-contrast CT data are used primarily for attenuation correction and anatomic correlation.  Evaluation of solid organs and bowel are  especially limited utilizing this technique. COMPARISON: 3/16/2017 CT, 12/9/2014 PET/CT FINDINGS: There is hypermetabolism in the hepatic dome treated metastasis now with an SUV of 11.1, 7.7 from in 2014 There is new ill-defined hypermetabolism in the left lower lobe. There is new patchy consolidation. The area has had some scarring in the March CT comparison. SUV is 10 No other hypermetabolism is seen that could reflect malignancy and/or infection. Some activity is seen along the left portacatheter. Review of the CT shows right lower quadrant ostomy with no focal abnormal activity. There is some pelvic floor laxity with rectal prolapse. Hepatic steatosis as before. Small gallstones. No acute bony abnormality is seen No adrenal mass or splenomegaly     5/22/2017  1.  Recurrence involving the treated hepatic dome metastasis cavity now with an SUV of 11 2.  New left lower lobe patchy consolidation is hypermetabolic and most likely related to acute infection. Bronchogenic carcinoma or metastases are less likely 3.  No other hypermetabolism that may indicate malignancy. There is hepatic steatosis, pelvic floor laxity, right lower quadrant ostomy and gallstones        [facility] on 05/22/2017   Component Date Value Ref Range Status   • WBC 05/22/2017 4.6* 4.8 - 10.8 K/uL Final   • RBC 05/22/2017 3.76* 4.20 - 5.40 M/uL Final   • Hemoglobin 05/22/2017 12.5  12.0 - 16.0 g/dL Final   • Hematocrit 05/22/2017 37.3  37.0 - 47.0 % Final   • MCV 05/22/2017 99.2* 81.4 - 97.8 fL Final   • MCH 05/22/2017 33.2* 27.0 - 33.0 pg Final   • MCHC 05/22/2017 33.5* 33.6 - 35.0 g/dL Final   • RDW 05/22/2017 54.1* 35.9 - 50.0 fL Final   • Platelet Count 05/22/2017 264  164 - 446 K/uL Final   • MPV 05/22/2017 9.8  9.0 - 12.9 fL Final   • Neutrophils-Polys 05/22/2017 61.10  44.00 - 72.00 % Final   • Lymphocytes 05/22/2017 17.60* 22.00 - 41.00 % Final   • Monocytes 05/22/2017 17.60* 0.00 - 13.40 % Final   • Eosinophils  05/22/2017 1.30  0.00 - 6.90 % Final   • Basophils 05/22/2017 1.10  0.00 - 1.80 % Final   • Immature Granulocytes 05/22/2017 1.30* 0.00 - 0.90 % Final   • Nucleated RBC 05/22/2017 0.00   Final   • Neutrophils (Absolute) 05/22/2017 2.78  2.00 - 7.15 K/uL Final    Includes immature neutrophils, if present.   • Lymphs (Absolute) 05/22/2017 0.80* 1.00 - 4.80 K/uL Final   • Monos (Absolute) 05/22/2017 0.80  0.00 - 0.85 K/uL Final   • Eos (Absolute) 05/22/2017 0.06  0.00 - 0.51 K/uL Final   • Baso (Absolute) 05/22/2017 0.05  0.00 - 0.12 K/uL Final   • Immature Granulocytes (abs) 05/22/2017 0.06  0.00 - 0.11 K/uL Final   • NRBC (Absolute) 05/22/2017 0.00   Final   • Sodium 05/22/2017 131* 135 - 145 mmol/L Final   • Potassium 05/22/2017 3.8  3.6 - 5.5 mmol/L Final   • Chloride 05/22/2017 101  96 - 112 mmol/L Final   • Co2 05/22/2017 20  20 - 33 mmol/L Final   • Anion Gap 05/22/2017 10.0  0.0 - 11.9 Final   • Glucose 05/22/2017 90  65 - 99 mg/dL Final   • Bun 05/22/2017 21  8 - 22 mg/dL Final   • Creatinine 05/22/2017 1.06  0.50 - 1.40 mg/dL Final   • Calcium 05/22/2017 9.6  8.5 - 10.5 mg/dL Final   • AST(SGOT) 05/22/2017 28  12 - 45 U/L Final   • ALT(SGPT) 05/22/2017 24  2 - 50 U/L Final   • Alkaline Phosphatase 05/22/2017 111* 30 - 99 U/L Final   • Total Bilirubin 05/22/2017 0.9  0.1 - 1.5 mg/dL Final   • Albumin 05/22/2017 3.8  3.2 - 4.9 g/dL Final   • Total Protein 05/22/2017 6.9  6.0 - 8.2 g/dL Final   • Globulin 05/22/2017 3.1  1.9 - 3.5 g/dL Final   • A-G Ratio 05/22/2017 1.2   Final   • Carcinoembryonic Antigen 05/22/2017 12.9* 0.0 - 3.0 ng/mL Final    Comment: Effective September 17, 2013 the quantitative determination  of Carcinoembryonic Antigen (CEA) will now be performed at  [facility].  The Access CEA paramagnetic  particle chemiluminescent immunoassay is used.  Results  obtained with different test methods or kits cannot be used interchangeably.  Measurement of CEA has been shown to  be  clinically relevant in the management of patients with  colorectal, breast, lung, prostatic, pancreatic, and ovarian  carcinomas.  Smokers may have slightly elevated levels of CEA.     • GFR If  05/22/2017 >60  >60 mL/min/1.73 m 2 Final   • GFR If Non  05/22/2017 50* >60 mL/min/1.73 m 2 Final          Assessment/Plan:     1. Secondary malignant neoplasm of liver (CMS-HCC)  CT-ABDOMEN WITH & W/O    CANCELED: CT-ABDOMEN WITH   2. Rectal cancer (CMS-HCC)  CT-ABDOMEN WITH & W/O    CANCELED: CT-ABDOMEN WITH   3. Lung infection  azithromycin (ZITHROMAX) 250 MG Tab    DX-CHEST-2 VIEWS     Plan  1. Patient with a likely left lower lobe lung infection seen on PET/CT. At the request of Dr. Hardy, we will hold chemotherapy today and treat patient with azithromycin. Patient to follow up in 2 weeks prior to her next cycle of chemotherapy but I will have her completed chest x-ray prior to being seen. Chest x-ray has been ordered and patient is aware to complete in 2 weeks.    2. I did review patient's CBC, CMP and CEA and her CEA is little bit less at 12.9. Patient's PET CT does show what appears to be recurrent disease of the liver. I've asked patient to get back in to see her surgeon, Norma Kessler. We did contact the surgeons office and patient is scheduled to be seen in his office on Tuesday, May 30. At the request of the surgeon he would like a CT scan of the abdomen with liver protocol completed prior to being seen. CT has been ordered and patient is scheduled to complete the CT liver tomorrow, May 24.    3. Patient's to follow up in 2 weeks or sooner if needed, which will be after she is seen by the surgeon.

## 2017-05-23 NOTE — MR AVS SNAPSHOT
"        Karlene Walters   2017 11:00 AM   Non-Provider Visit   MRN: [id number]    Department:  [facility]   Dept Phone:  [phone]    Description:  Female : 1940   Provider:  ONC RN 1           Allergies as of 2017     Allergen Noted Reactions    Codeine 2011       \"gets drunk\"    Oxaliplatin 10/27/2015   Anaphylaxis    Pcn [Penicillins] 2011   Itching    Sulfa Drugs 2011   Itching    Tape 2013   Rash    PAPER TAPE OK      Vital Signs     Smoking Status                   Never Smoker            Basic Information     Date Of Birth Sex Race Ethnicity Preferred Language    1940 Female White Non- English      Your appointments     May 24, 2017  9:30 AM   Non Provider 1 with ONC RN 1   [facility] (--)    [address], [facility] [address]  [facility] [id number] [phone]           You will be receiving a confirmation call a few days before your appointment from our automated call confirmation system.            May 24, 2017  1:00 PM   CT BODY WITH with [facility] CT 2   [facility] [place]    [address]  [facility] [place] [id number] [phone]           Taking medications as regularly scheduled is strongly encouraged.  *For Abdominal CT-Patient needs to  oral contrast and instruction from the department at least 2 hours prior to exam. Patient may  contrast at any imaging facility.            2017  9:00 AM   Lab Draw with INFUSION QUICK INJECT   Infusion Services ([facility])    [address] [place] [address] [address]  [facility] [id number] [phone]            2017 10:00 AM   ONCOLOGY EST PATIENT 30 MIN with Eric Hardy M.D.   [facility] (--)    [address], [address]  [facility] [place] [id number] [phone]            2017 11:00 AM   Est Chemo 3 with RN 2   [facility]fusion [facility] ([facility])    [id number]  [facility] [place] [place] [id number] [phone]            2017 11:30 AM   EST " [place] / [facility] with INFUSION QUICK INJECT   Infusion Services ([facility])    [address]  [facility] [address]   [phone]            Jun 19, 2017 10:30 AM   BONE DENSITY (DEXASCAN) with [facility] BD 1   [facility] ([address])    [address]  [facility] [address]   [phone]           No calcium supplements 24 hours prior to exam, including antacids or multivitamins w/calcium.  Pt may eat and drink normally.  No injection procedures prior to Dexa on the same day.  No barium contrast, no CTs with IV or oral contrast, no Pet/CTs and no Nuc Med injections for 7 days prior to a Dexa (including Barium Swallow, Upper GI, Small Bowel Series).  If scheduling a Dexa on the same day as a CT with IV or oral contrast, any test with barium, Pet/CT or a Nuc Med with injection, always schedule the Dexa before the other study.            Jun 20, 2017  8:30 AM   Lab Draw with INFUSION QUICK INJECT   [facility] ([facility])    [address] [address]   [phone]            Jun 20, 2017  9:00 AM   ONCOLOGY EST PATIENT 30 MIN with ARTURO Arteaga   [facility] (--)    [address] [place] [address], [address]  [facility] [address]   [phone]            Jun 20, 2017 10:00 AM   Est Chemo 3 with RN 2   Infusion [facility] ([facility])    [address] [address]   [phone]            Jun 22, 2017 11:30 AM   EST [place] / [facility] Care with INFUSION QUICK INJECT   Infusion [facility] ([facility])    [address] [address]   [phone]            Jul 04, 2017  9:30 AM   Est Chemo 3 with RN 5   Infusion [facility] ([facility])    [address] [address]   [phone]            Jul 06, 2017 11:00 AM   EST [place] / [facility] Line Care with INFUSION QUICK INJECT   Infusion [facility] ([facility])    [address] [address]   [phone]            Jul 18, 2017   8:00 AM   Lab Draw with INFUSION QUICK INJECT   Infusion Services ([facility])    [address] [facility] [address]  [place] [address]   [phone]            Jul 18, 2017  8:40 AM   ONCOLOGY EST PATIENT 30 MIN with Eric Hardy M.D.   [facility] (--)    [address], [address]  [place] [address]   [phone]            Jul 18, 2017  9:30 AM   Est Chemo 3 with RN 3   Infusion [facility] ([facility])    [address] [facility] [address]  [place] [address]   [phone]            Jul 19, 2017 10:30 AM   FOLLOW UP with Ahsan Haque M.D.   [facility] (--)    [address]  [place] [address]   [phone]            Jul 20, 2017 11:30 AM   EST [place] / [facility] Line Care with INFUSION QUICK INJECT   Infusion Services ([facility])    [address] [facility] [address]  [place] [address]   [phone]            Nov 13, 2017  9:40 AM   Established Patient with Manan Quiñonez M.D.   [facility] [address]    [address]  [place] [address]   [phone]           You will be receiving a confirmation call a few days before your appointment from our automated call confirmation system.              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   10/15/2014 - Present    Thyroid nodule E04.1   12/19/2014 - Present    Secondary malignant neoplasm of liver (CMS-HCC) C78.7   7/6/2015 - Present    Dehydration E86.0   8/14/2015 - Present     Nausea & vomiting R11.2 Medium  8/15/2015 - Present    Colon cancer (CMS-HCC) C18.9   8/16/2015 - Present    Pulmonary embolism (CMS-HCC) I26.99 High  8/18/2015 - Present    Ileitis K52.9   8/24/2015 - Present    Diarrhea R19.7   8/24/2015 - Present    Hyperlipidemia E78.5   9/17/2015 - Present    Lightheadedness R42   9/17/2015 - Present    Shortness of breath R06.02 High  10/5/2015 - Present    Routine general medical examination at a health care facility Z00.00   3/14/2016 - Present    Renal insufficiency N28.9   3/14/2016 - Present    Hyperglycemia R73.9   3/14/2016 - Present    Decreased GFR R94.4   8/16/2016 - Present    Chemotherapy-induced neutropenia (CMS-HCC) D70.1   9/28/2016 - Present      Health Maintenance        Date Due Completion Dates    BONE DENSITY 4/13/2005 ---    IMM PNEUMOCOCCAL 65+ (ADULT) LOW/MEDIUM RISK SERIES (2 of 2 - PCV13) 5/3/2018 (Originally 9/5/2007) 9/5/2006    IMM DTaP/Tdap/Td Vaccine (1 - Tdap) 5/3/2018 (Originally 4/13/1959) ---    COLONOSCOPY 8/23/2025 8/23/2015, 9/20/2012            Current Immunizations     Influenza TIV (IM) 9/5/2013  6:12 AM, 10/12/2012  6:09 PM    Influenza Vaccine Quad Inj (Preserved) 10/18/2014    Pneumococcal polysaccharide vaccine (PPSV-23) 9/5/2006    SHINGLES VACCINE 1/14/2015      Below and/or attached are the medications your provider expects you to take. Review all of your home medications and newly ordered medications with your provider and/or pharmacist. Follow medication instructions as directed by your provider and/or pharmacist. Please keep your medication list with you and share with your provider. Update the information when medications are discontinued, doses are changed, or new medications (including over-the-counter products) are added; and carry medication information at all times in the event of emergency situations     Allergies:  CODEINE - (reactions not documented)     OXALIPLATIN - Anaphylaxis     PCN - Itching     SULFA DRUGS -  Itching     TAPE - Rash               Medications  Valid as of: May 23, 2017 - 11:37 AM    Generic Name Brand Name Tablet Size Instructions for use    Azithromycin (Tab) ZITHROMAX 250 MG Take 2 tabs on day 1, and then on day 2-5 take 1 tab.        Cholecalciferol (Cap) Vitamin D3 400 UNITS Take 1 Cap by mouth every day.        Cyanocobalamin (Tab) VITAMIN B-12 500 MCG Take 500 mcg by mouth every day.        Famotidine (Tab) PEPCID 40 MG Take 1 Tab by mouth every day.        Furosemide (Tab) LASIX 20 MG Take 1 Tab by mouth every day. PRN EDEMA        Ibuprofen (Tab) MOTRIN 200 MG Take 200 mg by mouth every 6 hours as needed.        Lidocaine-Prilocaine (Cream) EMLA 2.5-2.5 % APPLY A THICK FILM OVER THE PORT ACCESS SITE PRIOR TO ACCESS        Loperamide HCl (Liquid) IMODIUM 1 MG/5ML Take 2 mg by mouth 4 times a day as needed for Diarrhea.        Loratadine (Tab) CLARITIN 10 MG Take 10 mg by mouth every day. Indications: Hayfever        Multiple Vitamins-Minerals   Take  by mouth.        Ondansetron HCl (Tab) ZOFRAN 4 MG         OxyCODONE HCl (Tab) ROXICODONE 5 MG Take 5 mg by mouth every four hours as needed for Severe Pain.        Potassium Chloride (Tab CR) KLOR-CON 10 MEQ Take 1 Tab by mouth every day.        RaNITidine HCl (Tab) ZANTAC 150 MG Take 1 Tab by mouth 2 Times a Day.        Rivaroxaban (Tab) XARELTO 20 MG Take 1 Tab by mouth with dinner.        .                 Medicines prescribed today were sent to:     [facility] - [place], [place] - [address] [facility]    [address] [facility] [address]    Phone: [phone] Fax: [phone]    Open 24 Hours?: No    [facility] [id number]  [place], [place] - [address]    [address] [place] [address]    Phone: [phone] Fax: [phone]    Open 24 Hours?: No      Medication refill instructions:       If your prescription bottle indicates you have medication refills left, it is not necessary to call your provider’s office. Please  contact your pharmacy and they will refill your medication.    If your prescription bottle indicates you do not have any refills left, you may request refills at any time through one of the following ways: The online [employer] system (except Urgent Care), by calling your provider’s office, or by asking your pharmacy to contact your provider’s office with a refill request. Medication refills are processed only during regular business hours and may not be available until the next business day. Your provider may request additional information or to have a follow-up visit with you prior to refilling your medication.   *Please Note: Medication refills are assigned a new Rx number when refilled electronically. Your pharmacy may indicate that no refills were authorized even though a new prescription for the same medication is available at the pharmacy. Please request the medicine by name with the pharmacy before contacting your provider for a refill.           [employer] System[employer] Status: Patient Declined

## 2017-05-23 NOTE — MR AVS SNAPSHOT
"        Karlene Walters   2017 10:00 AM   Office Visit   MRN: [id number]    Department:  [facility]   Dept Phone:  [phone]    Description:  Female : 1940   Provider:  Ella De AAngelaPNICANOR.           Reason for Visit     Follow-Up prechemo      Allergies as of 2017     Allergen Noted Reactions    Codeine 2011       \"gets drunk\"    Oxaliplatin 10/27/2015   Anaphylaxis    Pcn [Penicillins] 2011   Itching    Sulfa Drugs 2011   Itching    Tape 2013   Rash    PAPER TAPE OK      You were diagnosed with     Secondary malignant neoplasm of liver (CMS-HCC)   [197.7.ICD-9-CM]       Rectal cancer (CMS-HCC)   [237241]       Lung infection   [130380]         Vital Signs     Blood Pressure Pulse Temperature Respirations Height Weight    116/70 mmHg 92 36.7 °C (98.1 °F) 16 1.657 m (5' 5.25\") 75.25 kg (165 lb 14.3 oz)    Body Mass Index Oxygen Saturation Breastfeeding? Smoking Status          27.41 kg/m2 97% No Never Smoker         Basic Information     Date Of Birth Sex Race Ethnicity Preferred Language    1940 Female White Non- English      Your appointments     2017  9:00 AM   Lab Draw with INFUSION QUICK INJECT   Infusion Services ([facility])    [address]  [facility] [address] [phone]            2017 10:00 AM   ONCOLOGY EST PATIENT 30 MIN with Eric Hardy M.D.   [facility] (--)    [address] [place] [address], [address]  [facility] [address] [phone]            2017 11:00 AM   Est Chemo 3 with RN 2   Infusion [facility] ([facility])    [address]  [facility] [address] [phone]            2017 11:30 AM   EST Port Flush / Central Line Care with INFUSION QUICK INJECT   Infusion [facility] ([facility])    [address]  [facility] [address]   [phone]            2017 10:30 AM   BONE DENSITY (DEXASCAN) with [facility] BD 1   [facility])    [address] [facility] [address]  [facility] [address]   [phone]           No calcium supplements 24 hours prior to exam, including antacids or multivitamins w/calcium.  Pt may eat and drink normally.  No injection procedures prior to Dexa on the same day.  No barium contrast, no CTs with IV or oral contrast, no Pet/CTs and no Nuc Med injections for 7 days prior to a Dexa (including Barium Swallow, Upper GI, Small Bowel Series).  If scheduling a Dexa on the same day as a CT with IV or oral contrast, any test with barium, Pet/CT or a Nuc Med with injection, always schedule the Dexa before the other study.            Jun 20, 2017  8:30 AM   Lab Draw with INFUSION QUICK INJECT   Infusion Services ([facility])    [address] [address]   [phone]            Jun 20, 2017  9:00 AM   ONCOLOGY EST PATIENT 30 MIN with ARTURO Arteaga   [facility] (--)    [address], [address]  [facility] [address]   [phone]            Jun 20, 2017 10:00 AM   Est Chemo 3 with RN 2   Infusion [facility] ([facility])    [address] [address]   [phone]            Jun 22, 2017 11:30 AM   EST Port Flush / Central Line Care with INFUSION QUICK INJECT   Infusion [facility] ([facility])    [address] [address]   [phone]            Jul 04, 2017  9:30 AM   Est Chemo 3 with RN 5   Infusion [facility] ([facility])    [address] [address]   [phone]            Jul 06, 2017 11:00 AM   EST Port Flush / Central Line Care with INFUSION QUICK INJECT   Infusion [facility] ([facility])    [address] [address]   [phone]            Jul 18, 2017  8:00 AM   Lab Draw with INFUSION QUICK INJECT   Infusion [facility] ([facility])    [address] [address]   [phone]            Jul 18, 2017  8:40 AM   ONCOLOGY EST PATIENT 30 MIN with Eric Hardy M.D.   [facility] (--)    [address] [email or web address]]  [address]  [place] [address]   [phone]            Jul 18, 2017  9:30 AM   Est Chemo 3 with RN 3   Infusion Services ([facility])    [address] [facility] [address]  [place] [address]   [phone]            Jul 19, 2017 10:30 AM   FOLLOW UP with Ahsan Haque M.D.   [facility] (--)    [address]  [place] [address]   [phone]            Jul 20, 2017 11:30 AM   EST Port Flush / Central Line Care with INFUSION QUICK INJECT   Infusion Services ([facility])    [address] [facility] [address]  [place] [address]   [phone]            Nov 13, 2017  9:40 AM   Established Patient with Manan Quiñonez M.D.   [facility] [address]    [address]  [place] [address]   [phone]           You will be receiving a confirmation call a few days before your appointment from our automated call confirmation system.              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   10/15/2014 - Present    Thyroid nodule E04.1   12/19/2014 - Present    Secondary malignant neoplasm of liver (CMS-HCC) C78.7   7/6/2015 - Present    Dehydration E86.0   8/14/2015 - Present    Nausea & vomiting R11.2 Medium  8/15/2015 - Present    Colon cancer (CMS-HCC) C18.9   8/16/2015 - Present    Pulmonary embolism (CMS-HCC) I26.99 High  8/18/2015 - Present    Ileitis K52.9   8/24/2015 - Present    Diarrhea R19.7   8/24/2015 - Present    Hyperlipidemia E78.5    9/17/2015 - Present    Lightheadedness R42   9/17/2015 - Present    Shortness of breath R06.02 High  10/5/2015 - Present    Routine general medical examination at a health care facility Z00.00   3/14/2016 - Present    Renal insufficiency N28.9   3/14/2016 - Present    Hyperglycemia R73.9   3/14/2016 - Present    Decreased GFR R94.4   8/16/2016 - Present    Chemotherapy-induced neutropenia (CMS-HCC) D70.1   9/28/2016 - Present      Health Maintenance        Date Due Completion Dates    BONE DENSITY 4/13/2005 ---    IMM PNEUMOCOCCAL 65+ (ADULT) LOW/MEDIUM RISK SERIES (2 of 2 - PCV13) 5/3/2018 (Originally 9/5/2007) 9/5/2006    IMM DTaP/Tdap/Td Vaccine (1 - Tdap) 5/3/2018 (Originally 4/13/1959) ---    COLONOSCOPY 8/23/2025 8/23/2015, 9/20/2012            Current Immunizations     Influenza TIV (IM) 9/5/2013  6:12 AM, 10/12/2012  6:09 PM    Influenza Vaccine Quad Inj (Preserved) 10/18/2014    Pneumococcal polysaccharide vaccine (PPSV-23) 9/5/2006    SHINGLES VACCINE 1/14/2015      Below and/or attached are the medications your provider expects you to take. Review all of your home medications and newly ordered medications with your provider and/or pharmacist. Follow medication instructions as directed by your provider and/or pharmacist. Please keep your medication list with you and share with your provider. Update the information when medications are discontinued, doses are changed, or new medications (including over-the-counter products) are added; and carry medication information at all times in the event of emergency situations     Allergies:  CODEINE - (reactions not documented)     OXALIPLATIN - Anaphylaxis     PCN - Itching     SULFA DRUGS - Itching     TAPE - Rash               Medications  Valid as of: May 23, 2017 - 10:47 AM    Generic Name Brand Name Tablet Size Instructions for use    Azithromycin (Tab) ZITHROMAX 250 MG Take 2 tabs on day 1, and then on day 2-5 take 1 tab.        Cholecalciferol (Cap) Vitamin  D3 400 UNITS Take 1 Cap by mouth every day.        Cyanocobalamin (Tab) VITAMIN B-12 500 MCG Take 500 mcg by mouth every day.        Famotidine (Tab) PEPCID 40 MG Take 1 Tab by mouth every day.        Furosemide (Tab) LASIX 20 MG Take 1 Tab by mouth every day. PRN EDEMA        Ibuprofen (Tab) MOTRIN 200 MG Take 200 mg by mouth every 6 hours as needed.        Lidocaine-Prilocaine (Cream) EMLA 2.5-2.5 % APPLY A THICK FILM OVER THE PORT ACCESS SITE PRIOR TO ACCESS        Loperamide HCl (Liquid) IMODIUM 1 MG/5ML Take 2 mg by mouth 4 times a day as needed for Diarrhea.        Loratadine (Tab) CLARITIN 10 MG Take 10 mg by mouth every day. Indications: Hayfever        Multiple Vitamins-Minerals   Take  by mouth.        Ondansetron HCl (Tab) ZOFRAN 4 MG         OxyCODONE HCl (Tab) ROXICODONE 5 MG Take 5 mg by mouth every four hours as needed for Severe Pain.        Potassium Chloride (Tab CR) KLOR-CON 10 MEQ Take 1 Tab by mouth every day.        RaNITidine HCl (Tab) ZANTAC 150 MG Take 1 Tab by mouth 2 Times a Day.        Rivaroxaban (Tab) XARELTO 20 MG Take 1 Tab by mouth with dinner.        .                 Medicines prescribed today were sent to:     [facility] [place], [place] - [address] [facility]    [address] [facility] [address]    Phone: [phone] Fax: [phone]    Open 24 Hours?: No    [facility] [address] [facility] [place] - [address] [address]    Phone: [phone] Fax: [phone]    Open 24 Hours?: No      Medication refill instructions:       If your prescription bottle indicates you have medication refills left, it is not necessary to call your provider’s office. Please contact your pharmacy and they will refill your medication.    If your prescription bottle indicates you do not have any refills left, you may request refills at any time through one of the following ways: The online [employer] system (except Urgent Care), by calling your provider’s  office, or by asking your pharmacy to contact your provider’s office with a refill request. Medication refills are processed only during regular business hours and may not be available until the next business day. Your provider may request additional information or to have a follow-up visit with you prior to refilling your medication.   *Please Note: Medication refills are assigned a new Rx number when refilled electronically. Your pharmacy may indicate that no refills were authorized even though a new prescription for the same medication is available at the pharmacy. Please request the medicine by name with the pharmacy before contacting your provider for a refill.        Your To Do List     Future Labs/Procedures Complete By Expires    DX-CHEST-2 VIEWS  As directed 5/23/2018         [facility] Status: Patient Declined

## 2017-05-24 ENCOUNTER — HOSPITAL ENCOUNTER (OUTPATIENT)
Dept: RADIOLOGY | Facility: MEDICAL CENTER | Age: 77
End: 2017-05-24
Attending: NURSE PRACTITIONER
Payer: MEDICARE

## 2017-05-24 ENCOUNTER — NON-PROVIDER VISIT (OUTPATIENT)
Dept: HEMATOLOGY ONCOLOGY | Facility: MEDICAL CENTER | Age: 77
End: 2017-05-24
Payer: MEDICARE

## 2017-05-24 DIAGNOSIS — C78.7 SECONDARY MALIGNANT NEOPLASM OF LIVER (HCC): ICD-10-CM

## 2017-05-24 DIAGNOSIS — C20 RECTAL CANCER (HCC): ICD-10-CM

## 2017-05-24 DIAGNOSIS — C18.9 MALIGNANT NEOPLASM OF COLON, UNSPECIFIED PART OF COLON (HCC): ICD-10-CM

## 2017-05-24 PROCEDURE — 74170 CT ABD WO CNTRST FLWD CNTRST: CPT

## 2017-05-24 PROCEDURE — 96523 IRRIG DRUG DELIVERY DEVICE: CPT | Performed by: INTERNAL MEDICINE

## 2017-05-24 PROCEDURE — 700117 HCHG RX CONTRAST REV CODE 255: Performed by: NURSE PRACTITIONER

## 2017-05-24 RX ADMIN — IOHEXOL 100 ML: 350 INJECTION, SOLUTION INTRAVENOUS at 13:39

## 2017-05-24 NOTE — PROGRESS NOTES
Pt presents ambulatory for port access prior to CT scan scheduled for today at [facility].  Pt has EMLA cream on her L chest port site.  Port accessed in sterile fashion with power-loc needle; brisk blood return noted.  Pt will have port flushed and de-accessed at CT location.  Pt tolerated well.

## 2017-05-24 NOTE — MR AVS SNAPSHOT
"        Karlene Walters   2017 9:30 AM   Appointment   MRN: [id number]    Department:  [facility]   Dept Phone:  [phone]    Description:  Female : 1940   Provider:  ONC RN 2           Allergies as of 2017     Allergen Noted Reactions    Codeine 2011       \"gets drunk\"    Oxaliplatin 10/27/2015   Anaphylaxis    Pcn [Penicillins] 2011   Itching    Sulfa Drugs 2011   Itching    Tape 2013   Rash    PAPER TAPE OK      Vital Signs     Smoking Status                   Never Smoker            Basic Information     Date Of Birth Sex Race Ethnicity Preferred Language    1940 Female White Non- English      Your appointments     May 24, 2017  1:00 PM   CT BODY WITH with [facility] CT 2   [facility] [place]    [address]  [facility] [address]   [phone]           Taking medications as regularly scheduled is strongly encouraged.  *For Abdominal CT-Patient needs to  oral contrast and instruction from the department at least 2 hours prior to exam. Patient may  contrast at any imaging facility.            2017  9:00 AM   Lab Draw with INFUSION QUICK INJECT   Infusion Services ([facility])    [address]  [facility] [address]   [phone]            2017 10:00 AM   ONCOLOGY EST PATIENT 30 MIN with Eric Hardy M.D.   [facility] (--)    [address], [address]  [facility] [address] [phone]            2017 11:00 AM   Est Chemo 3 with RN 2   In[facility] ([facility])    [address]  [facility] [address]   [phone]            2017 11:30 AM   EST Port Flush / Central Line Care with INFUSION QUICK INJECT   [facility] ([facility])    [address]  [facility] [address]   [phone]            2017 10:30 AM   BONE DENSITY (DEXASCAN) with Merged with [facility] BD 1   [facility]    [address]  [facility] [address]   [phone]           No calcium supplements 24 hours prior to exam, including antacids or multivitamins w/calcium.  Pt may eat and drink normally.  No injection procedures prior to Dexa on the same day.  No barium contrast, no CTs with IV or oral contrast, no Pet/CTs and no Nuc Med injections for 7 days prior to a Dexa (including Barium Swallow, Upper GI, Small Bowel Series).  If scheduling a Dexa on the same day as a CT with IV or oral contrast, any test with barium, Pet/CT or a Nuc Med with injection, always schedule the Dexa before the other study.            Jun 20, 2017  8:30 AM   Lab Draw with INFUSION QUICK INJECT   Infusion Services ([facility])    [address] [address]   [phone]            Jun 20, 2017  9:00 AM   ONCOLOGY EST PATIENT 30 MIN with ARTURO Arteaga   [facility] (--)    [address] [facility] [address]  [facility] [address]   [phone]            Jun 20, 2017 10:00 AM   Est Chemo 3 with RN 2   Infusion Services ([facility])    [address] [address]   [phone]            Jun 22, 2017 11:30 AM   EST Port Flush / Central Line Care with INFUSION QUICK INJECT   Infusion Services ([facility])    [address] [address]   [phone]            Jul 04, 2017  9:30 AM   Est Chemo 3 with RN 5   Infusion [facility] ([facility])    [address] [address]   [phone]            Jul 06, 2017 11:00 AM   EST Port Flush / Central Line Care with INFUSION QUICK INJECT   Infusion Services ([facility])    [address] [address]   [phone]            Jul 18, 2017  8:00 AM   Lab Draw with INFUSION QUICK INJECT   Infusion Services ([facility])    [address] [address]   [phone]            Jul 18, 2017  8:40 AM   ONCOLOGY EST PATIENT 30 MIN with Eric Hardy M.D.   [facility] (--)    [address] [facility] [address]  [facility]  [address]   [phone]            Jul 18, 2017  9:30 AM   Est Chemo 3 with RN 3   Infusion Services ([facility])    [address] [facility] [address]  [place] [address]   [phone]            Jul 19, 2017 10:30 AM   FOLLOW UP with Ahsan Haque M.D.   [facility] (--)    [address]  [place] [address]   [phone]            Jul 20, 2017 11:30 AM   EST Port Flush / Central Line Care with INFUSION QUICK INJECT   Infusion Services ([facility])    [address] [facility] [address]  [place] [address]   [phone]            Nov 13, 2017  9:40 AM   Established Patient with Manan Quiñonez M.D.   [facility] [address]    [address]  [place] [address]   [phone]           You will be receiving a confirmation call a few days before your appointment from our automated call confirmation system.              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   10/15/2014 - Present    Thyroid nodule E04.1   12/19/2014 - Present    Secondary malignant neoplasm of liver (CMS-HCC) C78.7   7/6/2015 - Present    Dehydration E86.0   8/14/2015 - Present    Nausea & vomiting R11.2 Medium  8/15/2015 - Present    Colon cancer (CMS-HCC) C18.9   8/16/2015 - Present    Pulmonary embolism (CMS-HCC) I26.99 High  8/18/2015 - Present    Ileitis K52.9   8/24/2015 - Present    Diarrhea R19.7   8/24/2015 - Present    Hyperlipidemia E78.5   9/17/2015 -  Present    Lightheadedness R42   9/17/2015 - Present    Shortness of breath R06.02 High  10/5/2015 - Present    Routine general medical examination at a health care facility Z00.00   3/14/2016 - Present    Renal insufficiency N28.9   3/14/2016 - Present    Hyperglycemia R73.9   3/14/2016 - Present    Decreased GFR R94.4   8/16/2016 - Present    Chemotherapy-induced neutropenia (CMS-HCC) D70.1   9/28/2016 - Present      Health Maintenance        Date Due Completion Dates    BONE DENSITY 4/13/2005 ---    IMM PNEUMOCOCCAL 65+ (ADULT) LOW/MEDIUM RISK SERIES (2 of 2 - PCV13) 5/3/2018 (Originally 9/5/2007) 9/5/2006    IMM DTaP/Tdap/Td Vaccine (1 - Tdap) 5/3/2018 (Originally 4/13/1959) ---    COLONOSCOPY 8/23/2025 8/23/2015, 9/20/2012            Current Immunizations     Influenza TIV (IM) 9/5/2013  6:12 AM, 10/12/2012  6:09 PM    Influenza Vaccine Quad Inj (Preserved) 10/18/2014    Pneumococcal polysaccharide vaccine (PPSV-23) 9/5/2006    SHINGLES VACCINE 1/14/2015      Below and/or attached are the medications your provider expects you to take. Review all of your home medications and newly ordered medications with your provider and/or pharmacist. Follow medication instructions as directed by your provider and/or pharmacist. Please keep your medication list with you and share with your provider. Update the information when medications are discontinued, doses are changed, or new medications (including over-the-counter products) are added; and carry medication information at all times in the event of emergency situations     Allergies:  CODEINE - (reactions not documented)     OXALIPLATIN - Anaphylaxis     PCN - Itching     SULFA DRUGS - Itching     TAPE - Rash               Medications  Valid as of: May 24, 2017 - 10:04 AM    Generic Name Brand Name Tablet Size Instructions for use    Azithromycin (Tab) ZITHROMAX 250 MG Take 2 tabs on day 1, and then on day 2-5 take 1 tab.        Cholecalciferol (Cap) Vitamin D3 400 UNITS  Take 1 Cap by mouth every day.        Cyanocobalamin (Tab) VITAMIN B-12 500 MCG Take 500 mcg by mouth every day.        Famotidine (Tab) PEPCID 40 MG Take 1 Tab by mouth every day.        Furosemide (Tab) LASIX 20 MG Take 1 Tab by mouth every day. PRN EDEMA        Ibuprofen (Tab) MOTRIN 200 MG Take 200 mg by mouth every 6 hours as needed.        Lidocaine-Prilocaine (Cream) EMLA 2.5-2.5 % APPLY A THICK FILM OVER THE PORT ACCESS SITE PRIOR TO ACCESS        Loperamide HCl (Liquid) IMODIUM 1 MG/5ML Take 2 mg by mouth 4 times a day as needed for Diarrhea.        Loratadine (Tab) CLARITIN 10 MG Take 10 mg by mouth every day. Indications: Hayfever        Multiple Vitamins-Minerals   Take  by mouth.        Ondansetron HCl (Tab) ZOFRAN 4 MG         OxyCODONE HCl (Tab) ROXICODONE 5 MG Take 5 mg by mouth every four hours as needed for Severe Pain.        Potassium Chloride (Tab CR) KLOR-CON 10 MEQ Take 1 Tab by mouth every day.        RaNITidine HCl (Tab) ZANTAC 150 MG Take 1 Tab by mouth 2 Times a Day.        Rivaroxaban (Tab) XARELTO 20 MG Take 1 Tab by mouth with dinner.        .                 Medicines prescribed today were sent to:     [facility] - [place], [place] - [address] [facility]    [address] [facility] [place] [address]    Phone: [phone] Fax: [phone]    Open 24 Hours?: No    [facility] [place], [place] - [address] [address]    Phone: [phone] Fax: [phone]    Open 24 Hours?: No      Medication refill instructions:       If your prescription bottle indicates you have medication refills left, it is not necessary to call your provider’s office. Please contact your pharmacy and they will refill your medication.    If your prescription bottle indicates you do not have any refills left, you may request refills at any time through one of the following ways: The online [employer] system (except Urgent Care), by calling your provider’s office, or by  asking your pharmacy to contact your provider’s office with a refill request. Medication refills are processed only during regular business hours and may not be available until the next business day. Your provider may request additional information or to have a follow-up visit with you prior to refilling your medication.   *Please Note: Medication refills are assigned a new Rx number when refilled electronically. Your pharmacy may indicate that no refills were authorized even though a new prescription for the same medication is available at the pharmacy. Please request the medicine by name with the pharmacy before contacting your provider for a refill.           MyChart Status: Patient Declined

## 2017-05-25 ENCOUNTER — APPOINTMENT (OUTPATIENT)
Dept: ONCOLOGY | Facility: MEDICAL CENTER | Age: 77
End: 2017-05-25
Attending: INTERNAL MEDICINE
Payer: MEDICARE

## 2017-05-25 ENCOUNTER — TELEPHONE (OUTPATIENT)
Dept: HEMATOLOGY ONCOLOGY | Facility: MEDICAL CENTER | Age: 77
End: 2017-05-25

## 2017-05-25 NOTE — TELEPHONE ENCOUNTER
CT scan with liver protocol completed at the request of Dr. Green. Results will be faxed over to surgeon as patient is being seen by him next Tuesday, May 30 for possible recurrence of metastatic disease to liver. CT scan does show consistency with pneumonia. Patient has been started on antibiotics and plan to repeat a chest x-ray in 2 weeks.

## 2017-05-30 ENCOUNTER — TELEPHONE (OUTPATIENT)
Dept: HEMATOLOGY ONCOLOGY | Facility: MEDICAL CENTER | Age: 77
End: 2017-05-30

## 2017-05-30 NOTE — TELEPHONE ENCOUNTER
"Patient stopped in this afternoon after her appointment with Dr. West and she stated she will be doing the \"Y-90\" radiation treatment. She states per Dr. ROJAS, that she is to continue her chemo as is currently scheduled. She wants to try Y-90 before needing to resort to surgery.   Just FYI.   "

## 2017-06-01 ENCOUNTER — HOSPITAL ENCOUNTER (OUTPATIENT)
Dept: RADIOLOGY | Facility: MEDICAL CENTER | Age: 77
End: 2017-06-01
Attending: NURSE PRACTITIONER
Payer: MEDICARE

## 2017-06-01 ENCOUNTER — HOSPITAL ENCOUNTER (OUTPATIENT)
Dept: LAB | Facility: MEDICAL CENTER | Age: 77
End: 2017-06-01
Attending: NURSE PRACTITIONER
Payer: MEDICARE

## 2017-06-01 ENCOUNTER — HOSPITAL ENCOUNTER (OUTPATIENT)
Dept: RADIOLOGY | Facility: MEDICAL CENTER | Age: 77
End: 2017-06-01
Attending: SURGERY
Payer: MEDICARE

## 2017-06-01 DIAGNOSIS — C78.7 SECONDARY MALIGNANT NEOPLASM OF LIVER (HCC): ICD-10-CM

## 2017-06-01 DIAGNOSIS — J18.9 LUNG INFECTION: ICD-10-CM

## 2017-06-01 DIAGNOSIS — C20 MALIGNANT NEOPLASM OF RECTUM (HCC): ICD-10-CM

## 2017-06-01 LAB
ALBUMIN SERPL BCP-MCNC: 4.1 G/DL (ref 3.2–4.9)
ALBUMIN/GLOB SERPL: 1.3 G/DL
ALP SERPL-CCNC: 113 U/L (ref 30–99)
ALT SERPL-CCNC: 21 U/L (ref 2–50)
ANION GAP SERPL CALC-SCNC: 11 MMOL/L (ref 0–11.9)
AST SERPL-CCNC: 26 U/L (ref 12–45)
BASOPHILS # BLD AUTO: 1 % (ref 0–1.8)
BASOPHILS # BLD: 0.08 K/UL (ref 0–0.12)
BILIRUB SERPL-MCNC: 0.6 MG/DL (ref 0.1–1.5)
BUN SERPL-MCNC: 20 MG/DL (ref 8–22)
CALCIUM SERPL-MCNC: 10.3 MG/DL (ref 8.5–10.5)
CHLORIDE SERPL-SCNC: 105 MMOL/L (ref 96–112)
CO2 SERPL-SCNC: 21 MMOL/L (ref 20–33)
CREAT SERPL-MCNC: 1.1 MG/DL (ref 0.5–1.4)
EOSINOPHIL # BLD AUTO: 0.14 K/UL (ref 0–0.51)
EOSINOPHIL NFR BLD: 1.8 % (ref 0–6.9)
ERYTHROCYTE [DISTWIDTH] IN BLOOD BY AUTOMATED COUNT: 51.7 FL (ref 35.9–50)
GFR SERPL CREATININE-BSD FRML MDRD: 48 ML/MIN/1.73 M 2
GLOBULIN SER CALC-MCNC: 3.1 G/DL (ref 1.9–3.5)
GLUCOSE SERPL-MCNC: 80 MG/DL (ref 65–99)
HCT VFR BLD AUTO: 43.9 % (ref 37–47)
HGB BLD-MCNC: 14.2 G/DL (ref 12–16)
IMM GRANULOCYTES # BLD AUTO: 0.05 K/UL (ref 0–0.11)
IMM GRANULOCYTES NFR BLD AUTO: 0.7 % (ref 0–0.9)
INR PPP: 1.36 (ref 0.87–1.13)
LYMPHOCYTES # BLD AUTO: 1.77 K/UL (ref 1–4.8)
LYMPHOCYTES NFR BLD: 23.2 % (ref 22–41)
MCH RBC QN AUTO: 32.3 PG (ref 27–33)
MCHC RBC AUTO-ENTMCNC: 32.3 G/DL (ref 33.6–35)
MCV RBC AUTO: 99.8 FL (ref 81.4–97.8)
MONOCYTES # BLD AUTO: 0.64 K/UL (ref 0–0.85)
MONOCYTES NFR BLD AUTO: 8.4 % (ref 0–13.4)
NEUTROPHILS # BLD AUTO: 4.95 K/UL (ref 2–7.15)
NEUTROPHILS NFR BLD: 64.9 % (ref 44–72)
NRBC # BLD AUTO: 0 K/UL
NRBC BLD AUTO-RTO: 0 /100 WBC
PLATELET # BLD AUTO: 277 K/UL (ref 164–446)
PMV BLD AUTO: 10.1 FL (ref 9–12.9)
POTASSIUM SERPL-SCNC: 3.7 MMOL/L (ref 3.6–5.5)
PROT SERPL-MCNC: 7.2 G/DL (ref 6–8.2)
PROTHROMBIN TIME: 17.2 SEC (ref 12–14.6)
RBC # BLD AUTO: 4.4 M/UL (ref 4.2–5.4)
SODIUM SERPL-SCNC: 137 MMOL/L (ref 135–145)
WBC # BLD AUTO: 7.6 K/UL (ref 4.8–10.8)

## 2017-06-01 PROCEDURE — 36415 COLL VENOUS BLD VENIPUNCTURE: CPT

## 2017-06-01 PROCEDURE — 80053 COMPREHEN METABOLIC PANEL: CPT

## 2017-06-01 PROCEDURE — 85610 PROTHROMBIN TIME: CPT

## 2017-06-01 PROCEDURE — 85025 COMPLETE CBC W/AUTO DIFF WBC: CPT

## 2017-06-01 NOTE — CONSULTS
Interventional Oncology Consultation      Re: Karlene Walters     MRN: [id number]   : 1940    Karlene Walters was referred by Kit Jackson MD. She is a 77 y.o. female seen in clinic for evaluation and possible intervention of unresectable metastatic rectal carcinoma to the right hepatic lobe. She is also under the care of Ella MORALES and Kolton Montgomery MD.    History of Present Illness:   has a history of rectal cancer diagnosed in . She has undergone chemotherapy and most recently has been on 5FU with the last infusion about a month ago. She has been on a cruise and the chemotherapy was held. Imaging revealed a segment 7/8 liver lesion and she underwent a microwave ablation with Dr. West. Subsequent imaging revealed a recurrence at the ablation site. Due to her prior multiple abdominal surgeries, the lesion is unresectable and she has been referred for evaluation with Y90 SIRT. In addition, Ms. 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 She has undergone several surgeries and while the fistula is healed, there is remnant scar tissue that has not been addressed due to her other more pressing issues. She is on antibiotics for a recent diagnosis of pneumonia and intends to get a follow up chest XRay after the appointment today. She complains of intermittent bleeding from the perineal tissue. She denies jaundice or abdominal pain. She describes watery stools that are at her baseline with the ileostomy. She uses imodium and diet to control her bowel movements. She complains of post chemotherapy nausea and night sweats. She complains of right wrist tendonitis for which she wears a brace, which helps. She reports a good appetite and denies unintentional weight loss or early satiety. She reports an increase in appetite with steroid use during her chemotherapy. She lives in [place] with her  but has a travel trailer in [place] set up for accommodations for her  many appointments.     She is seen today for review of imaging studies and discussion of possible transarterial therapy with Y90 radioembolization.     Past Medical History   Diagnosis Date   • Blood transfusion 1957   • Hypercholesteremia    • Dental disorder      dentures UPPERS AND LOWERS   • Hypertension    • Cancer (CMS-HCC) 09/2012     rectal cancer   • Obstruction      ileostomy   • Other specified symptom associated with female genital organs      HYSTERECTOMY 1993   • CATARACT      removed b/l   • Seasonal allergies    • Cold      cough   • Ileostomy in place (CMS-HCC)    • Fall    • Indigestion    • Arrhythmia    • Lightheadedness 9/17/2015   • Routine general medical examination at a [facility] care facility 3/14/2016     3/14/16   • Renal insufficiency 3/14/2016   • Chemotherapy-induced neutropenia (CMS-HCC) 9/28/2016   • 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)      Past Surgical History   Procedure Laterality Date   • Tonsillectomy     • Abdominal hysterectomy total  1983   • Eye surgery       cataracts   • Cyst excision  1972     ovarian   • Other  2009     left eye torn retina repair   • Colonoscopy flex w/endo us  9/20/2012     Performed by Harshil Bolton M.D. at [facility]   • Colectomy  9/26/2012     Performed by Kolton Montgomery M.D. at [facility]   • Laparoscopy  10/8/2012     Performed by Kolton Montgomery M.D. at [facility]   • Ileo loop diversion  10/8/2012     Performed by Kolton Montgomery M.D. at [facility]   • Sigmoidoscopy  2/27/2013     Performed by Kolton Montgomery M.D. at [facility]   • Fistula in ano repair  2/27/2013     Performed by Kolton Montgomery M.D. at [facility]   • Proctoscopy  7/17/2013     Performed by Kolton Montgomery M.D. at [facility]   • Biopsy general  7/17/2013     Performed by Kolton Montgomery M.D. at [facility]   • Recovery  8/26/2013     [facility]   • Fistula in ano repair  9/4/2013     Performed by Kolton Montgomery M.D. at SURGERY [facility]   • Flap rotation  9/4/2013     Performed by Kolton Montgomery M.D. at SUR[facility]ERY [facility]   • Irrigation & debridement general  10/23/2013     Performed by Kolton Montgomery M.D. at SUR[facility]ERY [facility]   • Perineal procedure  12/18/2013     Performed by Kolton Montgomery M.D. at SUR[facility]ERY [facility]   • Myocutaneous flap  12/18/2013     Performed by Kolton Montgomery M.D. at SUR[facility]ERY [facility]   • Cataract extraction with iol  2004     bilateral   • Irrigation & debridement general  2/19/2014     Performed by Kolton Montgomery M.D. at SUR[facility]ERY [facility]   • Flap graft  2/19/2014     Performed by Kolton Montgomery M.D. at SUR[facility]ERY [facility]   • Fistula in ano repair  10/15/2014     Performed by Kolton Montgomery M.D. at SUR[facility]ERY [facility]   • Perineal procedure  10/15/2014     Performed by Kolton Montgomery M.D. at SUR[facility]ERY [facility]   • Fistula in ano repair  1/28/2015     Performed by Kolton Montgomery M.D. at SUR[facility]ERY [facility]   • Perineal procedure  1/28/2015     Performed by Kolton Montgomery M.D. at SURGERY [facility]   • Hepatic ablation laparoscopic  7/6/2015     Procedure: HEPATIC ABLATION LAPAROSCOPIC.;  Surgeon: Kit West M.D.;  Location: SURGERY [facility];  Service:    • Cath placement Left 7/6/2015     Procedure: CATH PLACEMENT CEPHALIC POWERPORT;  Surgeon: Kit West M.D.;  Location: SURGERY [facility];  Service:    • Colonoscopy with biopsy  8/23/2015     Procedure: COLONOSCOPY WITH BIOPSY;  Surgeon: Wilbur Glasgow M.D.;  Location: ENDOSCOPY [facility];  Service:      Social History     Social History   • Marital Status:      Spouse Name: N/A   • Number of Children: N/A   • Years of Education: N/A     Occupational History   • Not on file.     Social History Main Topics   • Smoking status: Never Smoker    •  Smokeless tobacco: Never Used   • Alcohol Use: No      Comment: rare   • Drug Use: No   • Sexual Activity: No     Other Topics Concern   • Not on file     Social History Narrative     Family History   Problem Relation Age of Onset   • Heart Disease Mother    • Heart Failure Mother    • Hypertension Mother    • Hyperlipidemia Mother    • Cancer Mother    • Cancer Maternal Aunt 60     breast ca   • Lung Disease Brother      COPD/Emphysema/Smoker   • Cancer Brother      prostate cancer   • Alcohol/Drug Brother      Alcohol   • Kidney Disease Father      renal failure       Review of Systems:  Constitutional: positive for sweats, negative for fatigue, fevers, malaise and weight loss  Respiratory: positive for pneumonia, negative for cough and hemoptysis  Gastrointestinal: positive for diarrhea, negative for abdominal pain, constipation, dysphagia and jaundice  Hematologic/lymphatic: positive for low white blood cells on chemo  Musculoskeletal:positive for right wrist pain  Neurological: negative        Lab   Ref. Range 4/18/2017 08:25 5/2/2017 09:48 5/22/2017 08:54   WBC Latest Ref Range: 4.8-10.8 K/uL 3.0 (L) 4.4 (L) 4.6 (L)   RBC Latest Ref Range: 4.20-5.40 M/uL 3.78 (L) 3.80 (L) 3.76 (L)   Hemoglobin Latest Ref Range: 12.0-16.0 g/dL 12.7 12.7 12.5   Hematocrit Latest Ref Range: 37.0-47.0 % 37.3 37.9 37.3   MCV Latest Ref Range: 81.4-97.8 fL 98.7 (H) 99.7 (H) 99.2 (H)   MCH Latest Ref Range: 27.0-33.0 pg 33.6 (H) 33.4 (H) 33.2 (H)   MCHC Latest Ref Range: 33.6-35.0 g/dL 34.0 33.5 (L) 33.5 (L)   RDW Latest Ref Range: 35.9-50.0 fL 56.4 (H) 55.0 (H) 54.1 (H)   Platelet Count Latest Ref Range: 164-446 K/uL 138 (L) 169 264   MPV Latest Ref Range: 9.0-12.9 fL 9.9 10.1 9.8   Neutrophils-Polys Latest Ref Range: 44.00-72.00 % 36.90 (L) 56.40 61.10   Neutrophils (Absolute) Latest Ref Range: 2.00-7.15 K/uL 1.11 (L) 2.50 2.78   Lymphocytes Latest Ref Range: 22.00-41.00 % 37.20 27.50 17.60 (L)   Lymphs (Absolute) Latest Ref Range:  1.00-4.80 K/uL 1.12 1.22 0.80 (L)   Monocytes Latest Ref Range: 0.00-13.40 % 19.30 (H) 12.00 17.60 (H)   Monos (Absolute) Latest Ref Range: 0.00-0.85 K/uL 0.58 0.53 0.80   Eosinophils Latest Ref Range: 0.00-6.90 % 5.00 2.50 1.30   Eos (Absolute) Latest Ref Range: 0.00-0.51 K/uL 0.15 0.11 0.06   Basophils Latest Ref Range: 0.00-1.80 % 1.30 1.10 1.10   Baso (Absolute) Latest Ref Range: 0.00-0.12 K/uL 0.04 0.05 0.05   Immature Granulocytes Latest Ref Range: 0.00-0.90 % 0.30 0.50 1.30 (H)   Immature Granulocytes (abs) Latest Ref Range: 0.00-0.11 K/uL 0.01 0.02 0.06   Nucleated RBC Latest Units: /100 WBC 0.00 0.00 0.00   NRBC (Absolute) Latest Units: K/uL 0.00 0.00 0.00   Sodium Latest Ref Range: 135-145 mmol/L 143 139 131 (L)   Potassium Latest Ref Range: 3.6-5.5 mmol/L 3.2 (L) 3.7 3.8   Chloride Latest Ref Range:  mmol/L 109 110 101   Co2 Latest Ref Range: 20-33 mmol/L 21 20 20   Anion Gap Latest Ref Range: 0.0-11.9  13.0 (H) 9.0 10.0   Glucose Latest Ref Range: 65-99 mg/dL 103 (H) 102 (H) 90   Bun Latest Ref Range: 8-22 mg/dL 16 19 21   Creatinine Latest Ref Range: 0.50-1.40 mg/dL 0.90 1.18 1.06   GFR If  Latest Ref Range: >60 mL/min/1.73 m 2 >60 54 (A) >60   GFR If Non  Latest Ref Range: >60 mL/min/1.73 m 2 >60 44 (A) 50 (A)   Calcium Latest Ref Range: 8.5-10.5 mg/dL 8.8 9.6 9.6   AST(SGOT) Latest Ref Range: 12-45 U/L 19 21 28   ALT(SGPT) Latest Ref Range: 2-50 U/L 14 17 24   Alkaline Phosphatase Latest Ref Range: 30-99 U/L 95 98 111 (H)   Total Bilirubin Latest Ref Range: 0.1-1.5 mg/dL 1.0 0.7 0.9   Albumin Latest Ref Range: 3.2-4.9 g/dL 3.6 4.0 3.8   Total Protein Latest Ref Range: 6.0-8.2 g/dL 5.8 (L) 6.4 6.9   Globulin Latest Ref Range: 1.9-3.5 g/dL 2.2 2.4 3.1   A-G Ratio Latest Units: g/dL 1.6 1.7 1.2   Carcinoembryonic Antigen Latest Ref Range: 0.0-3.0 ng/mL  13.2 (H) 12.9 (H)       Pathology:  [facility] September 26, 2012:  This case is being addended to report the results  of IHC studies for  mismatch repair proteins to evaluate for Peacock syndrome. The technical  component of the IHC studies was performed by [facility], and  the interpretation was performed by Dr. Anderson at [facility]. All four  mismatch repair proteins are expressed in the tumor cells. These  findings make Peacock syndrome unlikely. These results are also reported  in the IHC section below.    Addendum Signed By: Norma Anderson M.D.  Addendum Date:  10/3/2012  Original Report Date:  09/28/2012    FINAL DIAGNOSIS:    A. Rectum:         Invasive low-grade (well-differentiated) adenocarcinoma arising          in the background of a tubular adenoma.         Invasion into the superficial most aspect of the muscularis          propria is present (pT2).         The tumor is located 1 cm above the peritoneal reflection in the          upper rectum (intraperitoneal rectum).         Surgical margins free of involvement by at least 1 cm.         Thirteen benign lymph nodes identified in this specimen (0/13).         See synoptic report below for complete details.  B. Rectal donuts:         Fragments of benign colonic mucosa consistent with anastomotic          rings.  C. Ileocolic:         Segment of distal ileum, cecum, and proximal ascending colon          demonstrating two tubular adenomas in the cecum. No high-grade          dysplasia identified.         Seventeen benign lymph nodes identified in this specimen (0/17).         No appendix identified.    Synoptic Report for Primary Carcinomas of Colon/Rectum:         -Specimen: Rectum, terminal ileum, cecum, and proximal ascending        colon.       -Procedure: Low anterior resection and right hemicolectomy.       -Specimen Length: 11.5 cm (rectum specimen).       -Tumor Site: Upper rectum.       -Tumor Location: Tumor is located 1 cm above the peritoneal        reflection.       -Tumor Size: 1.2 cm in maximal dimension.       -Macroscopic Tumor Perforation: Not  identified.       -Histologic Type: Adenocarcinoma.       -Histologic Grade: Low-grade (well-differentiated).       -Microscopic Tumor Extension: Tumor focally invades the        superficial most aspect of the muscularis propria.       -Margins:        Proximal Margin: Uninvolved by invasive carcinoma.        Distal Margin: Uninvolved by invasive carcinoma.        Circumferential (Radial) or Mesenteric Margin: Uninvolved by         invasive carcinoma.        Lateral Margin: Not applicable.        Distance from closest margin: 1 cm, distal.       -Treatment Effect: No prior treatment.       -Lymph-Vascular Invasion: Not identified.       -Perineural Invasion: Not identified.       -Tumor Deposits(discontinuous extramural extension): Not        identified.       -Type of Polyp in Which Invasive Carcinoma Arose: Tubular adenoma.       -Pathologic Staging:        Primary Tumor: pT2        Regional Lymph Nodes: pN0          Number of lymph nodes examined: 30          Number of lymph nodes involved: 0        Distant Metastasis: Not applicable.       -Additional Pathologic Findings: Tubular adenomas (2) present in        cecum.       -Ancillary Studies:        Immunohistochemistry studies for mismatched repair proteins:         -MLH1: Expressed.         -MSH2: Expressed.         -MSH6: Expressed.         -PMS2: Expressed.    Radiology:   Review of imaging obtained at [facility]:  CT abdomen May 24, 2017:  No significant change in area of decreased attenuation with peripheral enhancement involving the hepatic dome apparently representing recurrent metastases based on recent PET/CT.    Persistent nodular consolidation in the left lower lobe suggestive of pneumonia as described on PET/CT.    PET CT May 22, 2017:  1.  Recurrence involving the treated hepatic dome metastasis cavity now with an SUV of 11  2.  New left lower lobe patchy consolidation is hypermetabolic and most likely related to acute infection. Bronchogenic carcinoma  "or metastases are less likely  3.  No other hypermetabolism that may indicate malignancy. There is hepatic steatosis, pelvic floor laxity, right lower quadrant ostomy and gallstones      Current Outpatient Prescriptions   Medication Sig Dispense Refill   • ranitidine (ZANTAC) 150 MG Tab TAKE 1 TABLET BY MOUTH 2 TIMES A DAY. 60 Tab 3   • azithromycin (ZITHROMAX) 250 MG Tab Take 2 tabs on day 1, and then on day 2-5 take 1 tab. 6 Tab 0   • furosemide (LASIX) 20 MG Tab Take 1 Tab by mouth every day. PRN EDEMA 30 Tab 3   • potassium chloride ER (KLOR-CON) 10 MEQ tablet Take 1 Tab by mouth every day. 30 Tab 3   • rivaroxaban (XARELTO) 20 MG Tab tablet Take 1 Tab by mouth with dinner. 30 Tab 6   • famotidine (PEPCID) 40 MG Tab Take 1 Tab by mouth every day. 90 Tab 2   • lidocaine-prilocaine (EMLA) 2.5-2.5 % Cream APPLY A THICK FILM OVER THE PORT ACCESS SITE PRIOR TO ACCESS 30 g 2   • loperamide (IMODIUM) 1 MG/5ML Liquid Take 2 mg by mouth 4 times a day as needed for Diarrhea.     • ondansetron (ZOFRAN) 4 MG Tab tablet      • ibuprofen (MOTRIN) 200 MG Tab Take 200 mg by mouth every 6 hours as needed.     • oxycodone immediate-release (ROXICODONE) 5 MG Tab Take 5 mg by mouth every four hours as needed for Severe Pain.     • cyanocobalamin (VITAMIN B-12) 500 MCG Tab Take 500 mcg by mouth every day.     • Multiple Vitamins-Minerals (CENTRUM SILVER PO) Take  by mouth.     • Cholecalciferol (VITAMIN D3) 400 UNITS CAPS Take 1 Cap by mouth every day.     • loratadine (CLARITIN) 10 MG TABS Take 10 mg by mouth every day. Indications: Hayfever       No current facility-administered medications for this encounter.       Allergies   Allergen Reactions   • Codeine      \"gets drunk\"   • Oxaliplatin Anaphylaxis   • Pcn [Penicillins] Itching   • Sulfa Drugs Itching   • Tape Rash     PAPER TAPE OK       Physical Exam:  General Appearance: healthy, alert, no distress, cooperative  Skin: Skin color, texture, turgor normal. No rashes or " lesions.  Lungs: negative findings: normal respiratory rate and rhythm  Abdomen: positive findings:  Ileostomy present  Musculoskeletal: positive findings: right wrist brace in place. ROM WNL  Ambulates independently with steady gait.  ECOG Performance Status 0    Impression:   1. Unresectable metastatic rectal carcinoma to the right hepatic lobe.  2. Hypertension.  3. Hyperlipidemia.  4. Chemotherapy induced neutropenia.  5. Renal insufficiency.  6. History of pulmonary embolus, on Xarelto.      Plan:   Nyemar Juarez MD has reviewed 's history and imaging studies, examined the patient, and discussed treatment options.  is a candidate for palliative transarterial radioembolization of unresectable metastatic rectal cancer to the right hepatic lobe. We discussed the method of the procedure at length including angiography and embolization as well as the expected clinical course with the possibility of post-embolization syndrome nausea and pain and hospitalization. We explained that this procedure is palliative and not curative. We discussed the possibility of tumor recurrence and development of future tumors. We additionally discussed the risks, including bleeding and infection, damage to the arteries, reaction to any medications given during the procedure, potential side effects of contrast administration including renal damage, non-target embolization, radiation-induced ulcers or liver disease, liver failure, and death. We discussed alternatives of the procedure including surveillance with no intervention and chemoembolization. Radiation segmentectomy was also discussed but not recommended due to the risk of diaphragmatic or pulmonary injury in this particular location. The patient verbalizes understanding of risks and alternatives and elects to proceed. All questions were answered. Written pre- and post- procedure care instructions were provided.     The plan is as follows, pending insurance  authorization:  · Start proton pump inhibitor.  · Hold Xarelto 2 days prior to each procedure (clearance pending).  · Hold 5FU until after Y90 procedure.  · Repeat labs.  · Mapping June 6.  · Y90 SIRT right hepatic lobe Kasia 15.  · Follow up after 1 week with labs.  · Tentative restart chemotherapy date 6/27 pending evaluation after Y90 and discussion with medical oncology APRN.  · Imaging planned by Dr. West at 2-3 months.    Rabia Mckeon APRN with Neymar Juarez MD  Interventional Radiology   [id number] [facility] [id number])  [place] [address]  [phone]

## 2017-06-02 ENCOUNTER — TELEPHONE (OUTPATIENT)
Dept: HEMATOLOGY ONCOLOGY | Facility: MEDICAL CENTER | Age: 77
End: 2017-06-02

## 2017-06-02 DIAGNOSIS — J18.9 PNEUMONIA DUE TO INFECTIOUS ORGANISM, UNSPECIFIED LATERALITY, UNSPECIFIED PART OF LUNG: ICD-10-CM

## 2017-06-02 DIAGNOSIS — R06.02 SHORTNESS OF BREATH: ICD-10-CM

## 2017-06-02 NOTE — TELEPHONE ENCOUNTER
Patient is scheduled to start the process for Y90. After discussion with ANDREW Last with interventional radiology, it was determined that Dr. Juarez would like to hold off on chemotherapy 2 weeks prior to Y90. Patient is scheduled to have her simulation on June 6 with the anticipation of proceeding with Y90 on Kasia 15. For not be able to proceed with chemotherapy on June 7 as initially thought. Also after discussing with ANDREW Last, we'll tentatively schedule patient to resume chemotherapy on Tuesday, June 27. I discussed this with the patient and appropriate appointments have been made.    Patient was diagnosed with pneumonia and has completed antibiotics. She states she's noticed a decrease in her cough. Chest x-ray was completed yesterday which shows mild residual consolidation in the lungs. Patient to follow up prior to her chemotherapy which is scheduled for June 27 in the office with labs. I will order another repeat chest x-ray to ensure that she is having continued resolution on imaging. Patient is aware to complete a chest x-ray prior to her appointment on June 27.

## 2017-06-06 ENCOUNTER — APPOINTMENT (OUTPATIENT)
Dept: RADIOLOGY | Facility: MEDICAL CENTER | Age: 77
End: 2017-06-06
Attending: RADIOLOGY
Payer: MEDICARE

## 2017-06-06 ENCOUNTER — APPOINTMENT (OUTPATIENT)
Dept: ONCOLOGY | Facility: MEDICAL CENTER | Age: 77
End: 2017-06-06
Attending: INTERNAL MEDICINE
Payer: MEDICARE

## 2017-06-06 ENCOUNTER — HOSPITAL ENCOUNTER (OUTPATIENT)
Facility: MEDICAL CENTER | Age: 77
End: 2017-06-06
Attending: RADIOLOGY | Admitting: RADIOLOGY
Payer: MEDICARE

## 2017-06-06 VITALS
SYSTOLIC BLOOD PRESSURE: 155 MMHG | BODY MASS INDEX: 27.39 KG/M2 | RESPIRATION RATE: 18 BRPM | DIASTOLIC BLOOD PRESSURE: 77 MMHG | HEART RATE: 65 BPM | OXYGEN SATURATION: 97 % | WEIGHT: 165.79 LBS

## 2017-06-06 DIAGNOSIS — C78.7 RECTAL ADENOCARCINOMA METASTATIC TO LIVER (HCC): ICD-10-CM

## 2017-06-06 DIAGNOSIS — C20 RECTAL ADENOCARCINOMA METASTATIC TO LIVER (HCC): ICD-10-CM

## 2017-06-06 PROCEDURE — 36248 INS CATH ABD/L-EXT ART ADDL: CPT

## 2017-06-06 PROCEDURE — 75726 ARTERY X-RAYS ABDOMEN: CPT

## 2017-06-06 PROCEDURE — 700111 HCHG RX REV CODE 636 W/ 250 OVERRIDE (IP)

## 2017-06-06 PROCEDURE — 36247 INS CATH ABD/L-EXT ART 3RD: CPT

## 2017-06-06 PROCEDURE — 99153 MOD SED SAME PHYS/QHP EA: CPT

## 2017-06-06 PROCEDURE — 37242 VASC EMBOLIZE/OCCLUDE ARTERY: CPT

## 2017-06-06 PROCEDURE — 78598 LUNG PERF&VENTILAT DIFERENTL: CPT

## 2017-06-06 PROCEDURE — 75774 ARTERY X-RAY EACH VESSEL: CPT

## 2017-06-06 PROCEDURE — 700117 HCHG RX CONTRAST REV CODE 255: Performed by: RADIOLOGY

## 2017-06-06 PROCEDURE — 76937 US GUIDE VASCULAR ACCESS: CPT

## 2017-06-06 PROCEDURE — 37243 VASC EMBOLIZE/OCCLUDE ORGAN: CPT

## 2017-06-06 RX ORDER — MIDAZOLAM HYDROCHLORIDE 1 MG/ML
.5-2 INJECTION INTRAMUSCULAR; INTRAVENOUS PRN
Status: ACTIVE | OUTPATIENT
Start: 2017-06-06 | End: 2017-06-06

## 2017-06-06 RX ORDER — HEPARIN SODIUM,PORCINE 1000/ML
VIAL (ML) INJECTION
Status: COMPLETED
Start: 2017-06-06 | End: 2017-06-06

## 2017-06-06 RX ORDER — ONDANSETRON 2 MG/ML
4 INJECTION INTRAMUSCULAR; INTRAVENOUS PRN
Status: ACTIVE | OUTPATIENT
Start: 2017-06-06 | End: 2017-06-06

## 2017-06-06 RX ORDER — HEPARIN SODIUM 1000 [USP'U]/ML
4000 INJECTION, SOLUTION INTRAVENOUS; SUBCUTANEOUS ONCE
Status: COMPLETED | OUTPATIENT
Start: 2017-06-06 | End: 2017-06-06

## 2017-06-06 RX ORDER — OXYCODONE HYDROCHLORIDE 5 MG/1
10 TABLET ORAL
Status: DISCONTINUED | OUTPATIENT
Start: 2017-06-06 | End: 2017-06-06 | Stop reason: HOSPADM

## 2017-06-06 RX ORDER — OXYCODONE HYDROCHLORIDE 5 MG/1
5 TABLET ORAL
Status: DISCONTINUED | OUTPATIENT
Start: 2017-06-06 | End: 2017-06-06 | Stop reason: HOSPADM

## 2017-06-06 RX ORDER — VERAPAMIL HYDROCHLORIDE 2.5 MG/ML
2.5 INJECTION, SOLUTION INTRAVENOUS
Status: DISCONTINUED | OUTPATIENT
Start: 2017-06-06 | End: 2017-06-06 | Stop reason: HOSPADM

## 2017-06-06 RX ORDER — MIDAZOLAM HYDROCHLORIDE 1 MG/ML
INJECTION INTRAMUSCULAR; INTRAVENOUS
Status: COMPLETED
Start: 2017-06-06 | End: 2017-06-06

## 2017-06-06 RX ORDER — MORPHINE SULFATE 4 MG/ML
4 INJECTION, SOLUTION INTRAMUSCULAR; INTRAVENOUS
Status: DISCONTINUED | OUTPATIENT
Start: 2017-06-06 | End: 2017-06-06 | Stop reason: HOSPADM

## 2017-06-06 RX ORDER — VERAPAMIL HYDROCHLORIDE 2.5 MG/ML
INJECTION, SOLUTION INTRAVENOUS
Status: COMPLETED
Start: 2017-06-06 | End: 2017-06-06

## 2017-06-06 RX ORDER — SODIUM CHLORIDE 9 MG/ML
500 INJECTION, SOLUTION INTRAVENOUS
Status: ACTIVE | OUTPATIENT
Start: 2017-06-06 | End: 2017-06-06

## 2017-06-06 RX ADMIN — VERAPAMIL HYDROCHLORIDE 2.5 MG: 2.5 INJECTION, SOLUTION INTRAVENOUS at 13:59

## 2017-06-06 RX ADMIN — HEPARIN SODIUM 4000 UNITS: 1000 INJECTION, SOLUTION INTRAVENOUS; SUBCUTANEOUS at 13:59

## 2017-06-06 RX ADMIN — IOHEXOL 50 ML: 300 INJECTION, SOLUTION INTRAVENOUS at 14:43

## 2017-06-06 RX ADMIN — MIDAZOLAM HYDROCHLORIDE 1 MG: 1 INJECTION INTRAMUSCULAR; INTRAVENOUS at 13:31

## 2017-06-06 RX ADMIN — FENTANYL CITRATE 25 MCG: 50 INJECTION, SOLUTION INTRAMUSCULAR; INTRAVENOUS at 13:31

## 2017-06-06 RX ADMIN — NITROGLYCERIN 0.5 ML: 20 INJECTION INTRAVENOUS at 13:59

## 2017-06-06 RX ADMIN — HEPARIN: 100 SYRINGE at 17:30

## 2017-06-06 RX ADMIN — MIDAZOLAM HYDROCHLORIDE 1 MG: 1 INJECTION INTRAMUSCULAR; INTRAVENOUS at 14:01

## 2017-06-06 RX ADMIN — MIDAZOLAM HYDROCHLORIDE 1 MG: 1 INJECTION INTRAMUSCULAR; INTRAVENOUS at 13:42

## 2017-06-06 RX ADMIN — MIDAZOLAM 1 MG: 1 INJECTION INTRAMUSCULAR; INTRAVENOUS at 13:31

## 2017-06-06 ASSESSMENT — PAIN SCALES - GENERAL
PAINLEVEL_OUTOF10: 0

## 2017-06-06 NOTE — IP AVS SNAPSHOT
6/6/2017    Karlene Walters  [address] [place] [address]    Dear Karlene:    [facility] wants to ensure your discharge home is safe and you or your loved ones have had all of your questions answered regarding your care after you leave the hospital.    Below is a list of resources and contact information should you have any questions regarding your hospital stay, follow-up instructions, or active medical symptoms.    Questions or Concerns Regarding… Contact   Medical Questions Related to Your Discharge  (7 days a week, 8am-5pm) Contact a Nurse Care Coordinator   [phone]   Medical Questions Not Related to Your Discharge  (24 hours a day / 7 days a week)  Contact the Nurse Health Line   [phone]    Medications or Discharge Instructions Refer to your discharge packet   or contact your [facility] Primary Care Provider   [phone]   Follow-up Appointment(s) Schedule your appointment via [facility]   or contact Scheduling [phone]   Billing Review your statement via [facility]  or contact Billing [phone]   Medical Records Review your records via [facility]   or contact Medical Records [phone]     You may receive a telephone call within two days of discharge. This call is to make certain you understand your discharge instructions and have the opportunity to have any questions answered. You can also easily access your medical information, test results and upcoming appointments via the [facility] free online health management tool. You can learn more and sign up at [facility]. For assistance setting up your [facility] account, please call [phone].    Once again, we want to ensure your discharge home is safe and that you have a clear understanding of any next steps in your care. If you have any questions or concerns, please do not hesitate to contact us, we are here for you. Thank you for choosing [facility] for your healthcare needs.    Sincerely,    Your [facility] Healthcare Team

## 2017-06-06 NOTE — OR NURSING
1540 patient arrived from IR s/p Y90 mapping left radial TR band clean, vss, discussed poc with patient and family agreeable, call light within reach will monitor.  1706 TR band removed gauze and tegaderm applied.  1735 discharge instructions given to patient, both patient and family verbalize understanding of the orders, port de accessed tip intact, vss, left radial dressing clean dry intact.  1738 patient escorted via w/c with all her personal belongings.

## 2017-06-06 NOTE — PROGRESS NOTES
IR Procedure Note:    Dr. Juarez performed a Y90 mapping via left radial artery.  The pt tolerated the procedure well, vital signs stable.  TR Band applied at 1443, beginning 18mL .   Report given to Juan Antonio BOWIE.  Pt and family transported back to [facility].     1) Right Gastric Artery    Vtrim VortX Sarah -18.  3mm x 3.3mm   REF:  [id number]   LOT:  [id number]

## 2017-06-06 NOTE — IP AVS SNAPSHOT
Home Care Instructions                                                                                                                Name:Karlene Walters  Medical Record Number:[id number]  CSN: [id number]    YOB: 1940   Age: 77 y.o.  Sex: female  HT:  WT: 75.2 kg (165 lb 12.6 oz)          Admit Date: 6/6/2017     Discharge Date:   Today's Date: 6/6/2017  Attending Doctor:  Neymar Juarez M.D.                  Allergies:  Codeine; Oxaliplatin; Pcn; Sulfa drugs; and Tape                Discharge Instructions         ACTIVITY: Rest and take it easy for the first 24 hours.  A responsible adult is recommended to remain with you during that time.  It is normal to feel sleepy.  We encourage you to not do anything that requires balance, judgment or coordination.    MILD FLU-LIKE SYMPTOMS ARE NORMAL. YOU MAY EXPERIENCE GENERALIZED MUSCLE ACHES, THROAT IRRITATION, HEADACHE AND/OR SOME NAUSEA.    FOR 24 HOURS DO NOT:  Drive, operate machinery or run household appliances.  Drink beer or alcoholic beverages.   Make important decisions or sign legal documents.    SPECIAL INSTRUCTIONS: follow up with primary care physician   Follow up with Dr juarez call with any questions [phone]  If you experience chest pain, s.o.b, call 911 return to ER    DIET: To avoid nausea, slowly advance diet as tolerated, avoiding spicy or greasy foods for the first day.  Add more substantial food to your diet according to your physician's instructions.  Babies can be fed formula or breast milk as soon as they are hungry.  INCREASE FLUIDS AND FIBER TO AVOID CONSTIPATION.    SURGICAL DRESSING/BATHING: keep dressing clean dry intact for 24 hours, you may remove dressing after 24 hours.    FOLLOW-UP APPOINTMENT:  A follow-up appointment should be arranged with your doctor in [phone]; call to schedule.    You should CALL YOUR PHYSICIAN if you develop:  Fever greater than 101 degrees F.  Pain not relieved by medication, or persistent nausea or  vomiting.  Excessive bleeding (blood soaking through dressing) or unexpected drainage from the wound.  Extreme redness or swelling around the incision site, drainage of pus or foul smelling drainage.  Inability to urinate or empty your bladder within 8 hours.  Problems with breathing or chest pain.    You should call 911 if you develop problems with breathing or chest pain.  If you are unable to contact your doctor or surgical center, you should go to the nearest emergency room or urgent care center.  Physician's telephone #: [phone]    If any questions arise, call your doctor.  If your doctor is not available, please feel free to call the Surgical Center at [phone].  The Center is open Monday through Friday from 7AM to 7PM.  You can also call the HEALTH HOTLINE open 24 hours/day, 7 days/week and speak to a nurse at [phone], or toll free at [phone].    A registered nurse may call you a few days after your surgery to see how you are doing after your procedure.    MEDICATIONS: Resume taking daily medication.  Take prescribed pain medication with food.  If no medication is prescribed, you may take non-aspirin pain medication if needed.  PAIN MEDICATION CAN BE VERY CONSTIPATING.  Take a stool softener or laxative such as senokot, pericolace, or milk of magnesia if needed.    If your physician has prescribed pain medication that includes Acetaminophen (Tylenol), do not take additional Acetaminophen (Tylenol) while taking the prescribed medication.    Depression / Suicide Risk    As you are discharged from this [facility] facility, it is important to learn how to keep safe from harming yourself.    Recognize the warning signs:  · Abrupt changes in personality, positive or negative- including increase in energy   · Giving away possessions  · Change in eating patterns- significant weight changes-  positive or negative  · Change in sleeping patterns- unable to sleep or sleeping all the  time   · Unwillingness or inability to communicate  · Depression  · Unusual sadness, discouragement and loneliness  · Talk of wanting to die  · Neglect of personal appearance   · Rebelliousness- reckless behavior  · Withdrawal from people/activities they love  · Confusion- inability to concentrate     If you or a loved one observes any of these behaviors or has concerns about self-harm, here's what you can do:  · Talk about it- your feelings and reasons for harming yourself  · Remove any means that you might use to hurt yourself (examples: pills, rope, extension cords, firearm)  · Get professional help from the community (Mental Health, Substance Abuse, psychological counseling)  · Do not be alone:Call your Safe Contact- someone whom you trust who will be there for you.  · Call your local CRISIS HOTLINE 984-4663 or 603-028-6888  · Call your local Children's Mobile Crisis Response Team [place] (832) 577-1979 or www.[employer]  · Call the toll free National Suicide Prevention Hotlines   · National Suicide Prevention Lifeline 697-040-ISHO (2088)  [place] Hope Line Network 800-SUICIDE (790-0399)  Radial Catherization Discharge Instructions      · Do not subject hand/arm to any forceful movements for 24 hours    i.e. supporting weight when rising from the chair or bed.   · Do not drive a car for 24 hours  · You may remove the dressing tomorrow  · You may shower on the day following your procedure.  Do not take a tub bath or submerge the puncture site in water for 3 days following the procedure.  · No Lifting more than 3-5 pounds with affected wrist for 5 days  · Follow up with  ***  2-4 weeks.  · Increase fluids for 2 days post procedure.  · Continue all previous medications unless otherwise instructed.    If bleeding should occur following discharge:  · Sit down and apply firm pressure to site with your fingers for 10 minutes  · If the bleeding stops, continue to sit quietly, keeping your wrist straight  for 2 hours.  Notify physician as soon as possible ( [phone])  · If bleeding does not stop after 10 minutes, or if there is a large amount of bleeding or spurting, call 911 immediately.  Do not drive yourself to the hospital.       Medication List      CONTINUE taking these medications        Instructions    Morning Afternoon Evening Bedtime    CENTRUM SILVER PO        Take  by mouth.                        cyanocobalamin 500 MCG Tabs   Commonly known as:  VITAMIN B-12        Take 500 mcg by mouth every day.   Dose:  500 mcg                        famotidine 40 MG Tabs   Commonly known as:  PEPCID        Take 1 Tab by mouth every day.   Dose:  40 mg                        furosemide 20 MG Tabs   Commonly known as:  LASIX        Doctor's comments:  Called to [facility] (take with Potassium)   Take 1 Tab by mouth every day. PRN EDEMA   Dose:  20 mg                        ibuprofen 200 MG Tabs   Commonly known as:  MOTRIN        Take 200 mg by mouth every 6 hours as needed.   Dose:  200 mg                        lidocaine-prilocaine 2.5-2.5 % Crea   Commonly known as:  EMLA        APPLY A THICK FILM OVER THE PORT ACCESS SITE PRIOR TO ACCESS                        loperamide 1 MG/5ML Liqd   Commonly known as:  IMODIUM        Take 2 mg by mouth 4 times a day as needed for Diarrhea.   Dose:  2 mg                        loratadine 10 MG Tabs   Commonly known as:  CLARITIN        Take 10 mg by mouth every day. Indications: Hayfever   Dose:  10 mg                        ondansetron 4 MG Tabs tablet   Commonly known as:  ZOFRAN                             oxycodone immediate-release 5 MG Tabs   Commonly known as:  ROXICODONE        Take 5 mg by mouth every four hours as needed for Severe Pain.   Dose:  5 mg                        potassium chloride ER 10 MEQ tablet   Commonly known as:  KLOR-CON        Doctor's comments:  Called to [facility] (take with furosemide)   Take 1 Tab by mouth every day.   Dose:  10 mEq                         ranitidine 150 MG Tabs   Commonly known as:  ZANTAC        TAKE 1 TABLET BY MOUTH 2 TIMES A DAY.                        rivaroxaban 20 MG Tabs tablet   Commonly known as:  XARELTO        Take 1 Tab by mouth with dinner.   Dose:  20 mg                        Vitamin D3 400 UNITS Caps        Take 1 Cap by mouth every day.   Dose:  1 Cap                                Medication Information     Above and/or attached are the medications your physician expects you to take upon discharge. Review all of your home medications and newly ordered medications with your doctor and/or pharmacist. Follow medication instructions as directed by your doctor and/or pharmacist. Please keep your medication list with you and share with your physician. Update the information when medications are discontinued, doses are changed, or new medications (including over-the-counter products) are added; and carry medication information at all times in the event of emergency situations.        Resources     Quit Smoking / Tobacco Use:    I understand the use of any tobacco products increases my chance of suffering from future heart disease or stroke and could cause other illnesses which may shorten my life. Quitting the use of tobacco products is the single most important thing I can do to improve my health. For further information on smoking / tobacco cessation call a Toll Free Quit Line at 1-982.540.6450 (*[employer] [place]) or 1-752.552.2951 (American Lung Association) or you can access the web based program at www.lungusa.org.    [place] Tobacco Users Help Line:  (857) 706-8456       Toll Free: 1-379.965.4606  Quit Tobacco Program [facility] (660)628-4729    Crisis Hotline:    [place] Crisis Hotline:  9-593-YFZYQTG or 1-127.568.6288    [place] Crisis Hotline:    1-412.590.7428 or 486-255-9541    Discharge Survey:   Thank you for choosing [facility]. We hope we did everything we could to make your  hospital stay a pleasant one. You may be receiving a survey and we would appreciate your time and participation in answering the questions. Your input is very valuable to us in our efforts to improve our service to our patients and their families.            Signatures     My signature on this form indicates that:    1. I acknowledge receipt and understanding of these Home Care Instruction.  2. My questions regarding this information have been answered to my satisfaction.  3. I have formulated a plan with my discharge nurse to obtain my prescribed medications for home.    __________________________________      __________________________________                   Patient Signature                                 Guardian/Responsible Adult Signature      __________________________________                 __________       ________                       Nurse Signature                                               Date                 Time

## 2017-06-06 NOTE — OR SURGEON
Immediate Post- Operative Note        PostOp Diagnosis: mCRC.       Procedure(s): Pre SIRT mapping and hepatopulmonary shunt study.       Estimated Blood Loss: Less than 5 ml        Complications: None            6/6/2017     1500 PM     Neymar Juarez

## 2017-06-07 ENCOUNTER — APPOINTMENT (OUTPATIENT)
Dept: ONCOLOGY | Facility: MEDICAL CENTER | Age: 77
End: 2017-06-07
Attending: INTERNAL MEDICINE
Payer: MEDICARE

## 2017-06-07 NOTE — DISCHARGE INSTRUCTIONS
ACTIVITY: Rest and take it easy for the first 24 hours.  A responsible adult is recommended to remain with you during that time.  It is normal to feel sleepy.  We encourage you to not do anything that requires balance, judgment or coordination.    MILD FLU-LIKE SYMPTOMS ARE NORMAL. YOU MAY EXPERIENCE GENERALIZED MUSCLE ACHES, THROAT IRRITATION, HEADACHE AND/OR SOME NAUSEA.    FOR 24 HOURS DO NOT:  Drive, operate machinery or run household appliances.  Drink beer or alcoholic beverages.   Make important decisions or sign legal documents.    SPECIAL INSTRUCTIONS: follow up with primary care physician   Follow up with Dr field call with any questions [phone]  If you experience chest pain, s.o.b, call 911 return to ER    DIET: To avoid nausea, slowly advance diet as tolerated, avoiding spicy or greasy foods for the first day.  Add more substantial food to your diet according to your physician's instructions.  Babies can be fed formula or breast milk as soon as they are hungry.  INCREASE FLUIDS AND FIBER TO AVOID CONSTIPATION.    SURGICAL DRESSING/BATHING: keep dressing clean dry intact for 24 hours, you may remove dressing after 24 hours.    FOLLOW-UP APPOINTMENT:  A follow-up appointment should be arranged with your doctor in [phone]; call to schedule.    You should CALL YOUR PHYSICIAN if you develop:  Fever greater than 101 degrees F.  Pain not relieved by medication, or persistent nausea or vomiting.  Excessive bleeding (blood soaking through dressing) or unexpected drainage from the wound.  Extreme redness or swelling around the incision site, drainage of pus or foul smelling drainage.  Inability to urinate or empty your bladder within 8 hours.  Problems with breathing or chest pain.    You should call 911 if you develop problems with breathing or chest pain.  If you are unable to contact your doctor or surgical center, you should go to the nearest emergency room or urgent care center.  Physician's telephone #:  [phone]    If any questions arise, call your doctor.  If your doctor is not available, please feel free to call the Surgical Center at [phone].  The Center is open Monday through Friday from 7AM to 7PM.  You can also call the HEALTH HOTLINE open 24 hours/day, 7 days/week and speak to a nurse at [phone], or toll free at [phone].    A registered nurse may call you a few days after your surgery to see how you are doing after your procedure.    MEDICATIONS: Resume taking daily medication.  Take prescribed pain medication with food.  If no medication is prescribed, you may take non-aspirin pain medication if needed.  PAIN MEDICATION CAN BE VERY CONSTIPATING.  Take a stool softener or laxative such as senokot, pericolace, or milk of magnesia if needed.    If your physician has prescribed pain medication that includes Acetaminophen (Tylenol), do not take additional Acetaminophen (Tylenol) while taking the prescribed medication.    Depression / Suicide Risk    As you are discharged from this [facility] facility, it is important to learn how to keep safe from harming yourself.    Recognize the warning signs:  · Abrupt changes in personality, positive or negative- including increase in energy   · Giving away possessions  · Change in eating patterns- significant weight changes-  positive or negative  · Change in sleeping patterns- unable to sleep or sleeping all the time   · Unwillingness or inability to communicate  · Depression  · Unusual sadness, discouragement and loneliness  · Talk of wanting to die  · Neglect of personal appearance   · Rebelliousness- reckless behavior  · Withdrawal from people/activities they love  · Confusion- inability to concentrate     If you or a loved one observes any of these behaviors or has concerns about self-harm, here's what you can do:  · Talk about it- your feelings and reasons for harming yourself  · Remove any means that you might use to hurt yourself (examples:  pills, rope, extension cords, firearm)  · Get professional help from the community (Mental Health, Substance Abuse, psychological counseling)  · Do not be alone:Call your Safe Contact- someone whom you trust who will be there for you.  · Call your local CRISIS HOTLINE 071-1790 or 979-586-9470  · Call your local Children's Mobile Crisis Response Team [place] (292) 045-2692 or www.[employer]  · Call the toll free National Suicide Prevention Hotlines   · National Suicide Prevention Lifeline 674-373-RGPO (7584)  [place] Hope Line Network 800-SUICIDE (253-2261)  Radial Catherization Discharge Instructions      · Do not subject hand/arm to any forceful movements for 24 hours    i.e. supporting weight when rising from the chair or bed.   · Do not drive a car for 24 hours  · You may remove the dressing tomorrow  · You may shower on the day following your procedure.  Do not take a tub bath or submerge the puncture site in water for 3 days following the procedure.  · No Lifting more than 3-5 pounds with affected wrist for 5 days  · Follow up with  ***  2-4 weeks.  · Increase fluids for 2 days post procedure.  · Continue all previous medications unless otherwise instructed.    If bleeding should occur following discharge:  · Sit down and apply firm pressure to site with your fingers for 10 minutes  · If the bleeding stops, continue to sit quietly, keeping your wrist straight for 2 hours.  Notify physician as soon as possible ( [phone])  · If bleeding does not stop after 10 minutes, or if there is a large amount of bleeding or spurting, call 911 immediately.  Do not drive yourself to the hospital.

## 2017-06-09 ENCOUNTER — APPOINTMENT (OUTPATIENT)
Dept: ONCOLOGY | Facility: MEDICAL CENTER | Age: 77
End: 2017-06-09
Attending: INTERNAL MEDICINE
Payer: MEDICARE

## 2017-06-13 ENCOUNTER — HOSPITAL ENCOUNTER (OUTPATIENT)
Dept: LAB | Facility: MEDICAL CENTER | Age: 77
End: 2017-06-13
Attending: NURSE PRACTITIONER
Payer: MEDICARE

## 2017-06-13 LAB
ALBUMIN SERPL BCP-MCNC: 4 G/DL (ref 3.2–4.9)
ALBUMIN/GLOB SERPL: 1.3 G/DL
ALP SERPL-CCNC: 109 U/L (ref 30–99)
ALT SERPL-CCNC: 19 U/L (ref 2–50)
ANION GAP SERPL CALC-SCNC: 9 MMOL/L (ref 0–11.9)
AST SERPL-CCNC: 21 U/L (ref 12–45)
BASOPHILS # BLD AUTO: 1.3 % (ref 0–1.8)
BASOPHILS # BLD: 0.07 K/UL (ref 0–0.12)
BILIRUB SERPL-MCNC: 0.5 MG/DL (ref 0.1–1.5)
BUN SERPL-MCNC: 24 MG/DL (ref 8–22)
CALCIUM SERPL-MCNC: 10.3 MG/DL (ref 8.5–10.5)
CHLORIDE SERPL-SCNC: 108 MMOL/L (ref 96–112)
CO2 SERPL-SCNC: 25 MMOL/L (ref 20–33)
CREAT SERPL-MCNC: 1.07 MG/DL (ref 0.5–1.4)
EOSINOPHIL # BLD AUTO: 0.2 K/UL (ref 0–0.51)
EOSINOPHIL NFR BLD: 3.6 % (ref 0–6.9)
ERYTHROCYTE [DISTWIDTH] IN BLOOD BY AUTOMATED COUNT: 50.3 FL (ref 35.9–50)
GFR SERPL CREATININE-BSD FRML MDRD: 50 ML/MIN/1.73 M 2
GLOBULIN SER CALC-MCNC: 3.1 G/DL (ref 1.9–3.5)
GLUCOSE SERPL-MCNC: 90 MG/DL (ref 65–99)
HCT VFR BLD AUTO: 43.6 % (ref 37–47)
HGB BLD-MCNC: 14.1 G/DL (ref 12–16)
IMM GRANULOCYTES # BLD AUTO: 0.01 K/UL (ref 0–0.11)
IMM GRANULOCYTES NFR BLD AUTO: 0.2 % (ref 0–0.9)
INR PPP: 1.5 (ref 0.87–1.13)
LYMPHOCYTES # BLD AUTO: 1.76 K/UL (ref 1–4.8)
LYMPHOCYTES NFR BLD: 31.6 % (ref 22–41)
MCH RBC QN AUTO: 32 PG (ref 27–33)
MCHC RBC AUTO-ENTMCNC: 32.3 G/DL (ref 33.6–35)
MCV RBC AUTO: 98.9 FL (ref 81.4–97.8)
MONOCYTES # BLD AUTO: 0.49 K/UL (ref 0–0.85)
MONOCYTES NFR BLD AUTO: 8.8 % (ref 0–13.4)
NEUTROPHILS # BLD AUTO: 3.04 K/UL (ref 2–7.15)
NEUTROPHILS NFR BLD: 54.5 % (ref 44–72)
NRBC # BLD AUTO: 0 K/UL
NRBC BLD AUTO-RTO: 0 /100 WBC
PLATELET # BLD AUTO: 224 K/UL (ref 164–446)
PMV BLD AUTO: 10.7 FL (ref 9–12.9)
POTASSIUM SERPL-SCNC: 4 MMOL/L (ref 3.6–5.5)
PROT SERPL-MCNC: 7.1 G/DL (ref 6–8.2)
PROTHROMBIN TIME: 18.6 SEC (ref 12–14.6)
RBC # BLD AUTO: 4.41 M/UL (ref 4.2–5.4)
SODIUM SERPL-SCNC: 142 MMOL/L (ref 135–145)
WBC # BLD AUTO: 5.6 K/UL (ref 4.8–10.8)

## 2017-06-13 PROCEDURE — 36415 COLL VENOUS BLD VENIPUNCTURE: CPT

## 2017-06-13 PROCEDURE — 85025 COMPLETE CBC W/AUTO DIFF WBC: CPT

## 2017-06-13 PROCEDURE — 80053 COMPREHEN METABOLIC PANEL: CPT

## 2017-06-13 PROCEDURE — 85610 PROTHROMBIN TIME: CPT

## 2017-06-15 ENCOUNTER — HOSPITAL ENCOUNTER (OUTPATIENT)
Facility: MEDICAL CENTER | Age: 77
End: 2017-06-16
Attending: RADIOLOGY | Admitting: RADIOLOGY
Payer: MEDICARE

## 2017-06-15 ENCOUNTER — APPOINTMENT (OUTPATIENT)
Dept: RADIOLOGY | Facility: MEDICAL CENTER | Age: 77
End: 2017-06-15
Attending: RADIOLOGY
Payer: MEDICARE

## 2017-06-15 ENCOUNTER — RESOLUTE PROFESSIONAL BILLING HOSPITAL PROF FEE (OUTPATIENT)
Dept: HOSPITALIST | Facility: MEDICAL CENTER | Age: 77
End: 2017-06-15
Payer: MEDICARE

## 2017-06-15 DIAGNOSIS — C78.7 RECTAL ADENOCARCINOMA METASTATIC TO LIVER (HCC): ICD-10-CM

## 2017-06-15 DIAGNOSIS — C20 RECTAL ADENOCARCINOMA METASTATIC TO LIVER (HCC): ICD-10-CM

## 2017-06-15 PROBLEM — R52 UNCONTROLLED PAIN: Status: ACTIVE | Noted: 2017-06-15

## 2017-06-15 PROCEDURE — 700102 HCHG RX REV CODE 250 W/ 637 OVERRIDE(OP): Performed by: INTERNAL MEDICINE

## 2017-06-15 PROCEDURE — 36247 INS CATH ABD/L-EXT ART 3RD: CPT

## 2017-06-15 PROCEDURE — 78215 LVR&SPLEEN IMG STATIC ONLY: CPT

## 2017-06-15 PROCEDURE — G0378 HOSPITAL OBSERVATION PER HR: HCPCS

## 2017-06-15 PROCEDURE — 37243 VASC EMBOLIZE/OCCLUDE ORGAN: CPT

## 2017-06-15 PROCEDURE — 306578 KIT,PORT ACCESS 20G X 1.5INCH: Performed by: RADIOLOGY

## 2017-06-15 PROCEDURE — 700111 HCHG RX REV CODE 636 W/ 250 OVERRIDE (IP): Performed by: INTERNAL MEDICINE

## 2017-06-15 PROCEDURE — 99153 MOD SED SAME PHYS/QHP EA: CPT

## 2017-06-15 PROCEDURE — 700101 HCHG RX REV CODE 250: Performed by: RADIOLOGY

## 2017-06-15 PROCEDURE — 700111 HCHG RX REV CODE 636 W/ 250 OVERRIDE (IP)

## 2017-06-15 PROCEDURE — 700105 HCHG RX REV CODE 258: Performed by: INTERNAL MEDICINE

## 2017-06-15 PROCEDURE — 160002 HCHG RECOVERY MINUTES (STAT)

## 2017-06-15 PROCEDURE — 700111 HCHG RX REV CODE 636 W/ 250 OVERRIDE (IP): Performed by: RADIOLOGY

## 2017-06-15 PROCEDURE — 99220 PR INITIAL OBSERVATION CARE,LEVL III: CPT | Performed by: INTERNAL MEDICINE

## 2017-06-15 PROCEDURE — A9270 NON-COVERED ITEM OR SERVICE: HCPCS | Performed by: INTERNAL MEDICINE

## 2017-06-15 PROCEDURE — 76937 US GUIDE VASCULAR ACCESS: CPT

## 2017-06-15 PROCEDURE — A9270 NON-COVERED ITEM OR SERVICE: HCPCS

## 2017-06-15 PROCEDURE — 700102 HCHG RX REV CODE 250 W/ 637 OVERRIDE(OP)

## 2017-06-15 RX ORDER — FAMOTIDINE 20 MG/1
20 TABLET, FILM COATED ORAL DAILY
Status: DISCONTINUED | OUTPATIENT
Start: 2017-06-16 | End: 2017-06-16 | Stop reason: HOSPADM

## 2017-06-15 RX ORDER — ONDANSETRON 4 MG/1
4 TABLET, ORALLY DISINTEGRATING ORAL ONCE
Status: COMPLETED | OUTPATIENT
Start: 2017-06-15 | End: 2017-06-15

## 2017-06-15 RX ORDER — MIDAZOLAM HYDROCHLORIDE 1 MG/ML
INJECTION INTRAMUSCULAR; INTRAVENOUS
Status: COMPLETED
Start: 2017-06-15 | End: 2017-06-15

## 2017-06-15 RX ORDER — SODIUM CHLORIDE 9 MG/ML
INJECTION, SOLUTION INTRAVENOUS CONTINUOUS
Status: DISCONTINUED | OUTPATIENT
Start: 2017-06-15 | End: 2017-06-15

## 2017-06-15 RX ORDER — MIDAZOLAM HYDROCHLORIDE 1 MG/ML
.5-2 INJECTION INTRAMUSCULAR; INTRAVENOUS PRN
Status: ACTIVE | OUTPATIENT
Start: 2017-06-15 | End: 2017-06-15

## 2017-06-15 RX ORDER — OXYCODONE HYDROCHLORIDE 5 MG/1
TABLET ORAL
Status: COMPLETED
Start: 2017-06-15 | End: 2017-06-15

## 2017-06-15 RX ORDER — ONDANSETRON 2 MG/ML
4 INJECTION INTRAMUSCULAR; INTRAVENOUS EVERY 4 HOURS PRN
Status: DISCONTINUED | OUTPATIENT
Start: 2017-06-15 | End: 2017-06-16 | Stop reason: HOSPADM

## 2017-06-15 RX ORDER — VERAPAMIL HYDROCHLORIDE 2.5 MG/ML
2.5 INJECTION, SOLUTION INTRAVENOUS ONCE
Status: COMPLETED | OUTPATIENT
Start: 2017-06-15 | End: 2017-06-15

## 2017-06-15 RX ORDER — VERAPAMIL HYDROCHLORIDE 2.5 MG/ML
INJECTION, SOLUTION INTRAVENOUS
Status: COMPLETED
Start: 2017-06-15 | End: 2017-06-15

## 2017-06-15 RX ORDER — ONDANSETRON 4 MG/1
4 TABLET, ORALLY DISINTEGRATING ORAL EVERY 4 HOURS PRN
Status: DISCONTINUED | OUTPATIENT
Start: 2017-06-15 | End: 2017-06-16 | Stop reason: HOSPADM

## 2017-06-15 RX ORDER — ONDANSETRON 2 MG/ML
4 INJECTION INTRAMUSCULAR; INTRAVENOUS PRN
Status: ACTIVE | OUTPATIENT
Start: 2017-06-15 | End: 2017-06-15

## 2017-06-15 RX ORDER — OXYCODONE HYDROCHLORIDE 10 MG/1
10 TABLET ORAL
Status: DISCONTINUED | OUTPATIENT
Start: 2017-06-15 | End: 2017-06-15

## 2017-06-15 RX ORDER — SODIUM CHLORIDE 9 MG/ML
INJECTION, SOLUTION INTRAVENOUS
Status: DISCONTINUED | OUTPATIENT
Start: 2017-06-15 | End: 2017-06-16 | Stop reason: HOSPADM

## 2017-06-15 RX ORDER — RANITIDINE 150 MG/1
150 TABLET ORAL 2 TIMES DAILY
Status: DISCONTINUED | OUTPATIENT
Start: 2017-06-15 | End: 2017-06-15

## 2017-06-15 RX ORDER — BISACODYL 10 MG
10 SUPPOSITORY, RECTAL RECTAL
Status: DISCONTINUED | OUTPATIENT
Start: 2017-06-15 | End: 2017-06-16 | Stop reason: HOSPADM

## 2017-06-15 RX ORDER — AMOXICILLIN 250 MG
2 CAPSULE ORAL 2 TIMES DAILY
Status: DISCONTINUED | OUTPATIENT
Start: 2017-06-15 | End: 2017-06-16 | Stop reason: HOSPADM

## 2017-06-15 RX ORDER — ONDANSETRON 2 MG/ML
4 INJECTION INTRAMUSCULAR; INTRAVENOUS EVERY 8 HOURS PRN
Status: DISCONTINUED | OUTPATIENT
Start: 2017-06-15 | End: 2017-06-15

## 2017-06-15 RX ORDER — HEPARIN SODIUM,PORCINE 1000/ML
VIAL (ML) INJECTION
Status: COMPLETED
Start: 2017-06-15 | End: 2017-06-15

## 2017-06-15 RX ORDER — POLYETHYLENE GLYCOL 3350 17 G/17G
1 POWDER, FOR SOLUTION ORAL
Status: DISCONTINUED | OUTPATIENT
Start: 2017-06-15 | End: 2017-06-16 | Stop reason: HOSPADM

## 2017-06-15 RX ORDER — CLINDAMYCIN PHOSPHATE 600 MG/50ML
600 INJECTION, SOLUTION INTRAVENOUS
Status: COMPLETED | OUTPATIENT
Start: 2017-06-15 | End: 2017-06-15

## 2017-06-15 RX ORDER — HYDRALAZINE HYDROCHLORIDE 20 MG/ML
INJECTION INTRAMUSCULAR; INTRAVENOUS
Status: COMPLETED
Start: 2017-06-15 | End: 2017-06-15

## 2017-06-15 RX ORDER — SODIUM CHLORIDE 9 MG/ML
500 INJECTION, SOLUTION INTRAVENOUS
Status: DISPENSED | OUTPATIENT
Start: 2017-06-15 | End: 2017-06-15

## 2017-06-15 RX ORDER — OXYCODONE HYDROCHLORIDE 5 MG/1
2.5 TABLET ORAL
Status: DISCONTINUED | OUTPATIENT
Start: 2017-06-15 | End: 2017-06-16 | Stop reason: HOSPADM

## 2017-06-15 RX ORDER — OXYCODONE HYDROCHLORIDE 5 MG/1
5 TABLET ORAL
Status: DISCONTINUED | OUTPATIENT
Start: 2017-06-15 | End: 2017-06-15

## 2017-06-15 RX ORDER — MORPHINE SULFATE 4 MG/ML
2 INJECTION, SOLUTION INTRAMUSCULAR; INTRAVENOUS
Status: DISCONTINUED | OUTPATIENT
Start: 2017-06-15 | End: 2017-06-16 | Stop reason: HOSPADM

## 2017-06-15 RX ORDER — OXYCODONE HYDROCHLORIDE 5 MG/1
5 TABLET ORAL
Status: DISCONTINUED | OUTPATIENT
Start: 2017-06-15 | End: 2017-06-16 | Stop reason: HOSPADM

## 2017-06-15 RX ORDER — HEPARIN SODIUM 1000 [USP'U]/ML
4000 INJECTION, SOLUTION INTRAVENOUS; SUBCUTANEOUS PRN
Status: DISCONTINUED | OUTPATIENT
Start: 2017-06-15 | End: 2017-06-16 | Stop reason: HOSPADM

## 2017-06-15 RX ORDER — HYDRALAZINE HYDROCHLORIDE 20 MG/ML
10 INJECTION INTRAMUSCULAR; INTRAVENOUS ONCE
Status: DISCONTINUED | OUTPATIENT
Start: 2017-06-15 | End: 2017-06-16 | Stop reason: HOSPADM

## 2017-06-15 RX ORDER — MORPHINE SULFATE 4 MG/ML
4 INJECTION, SOLUTION INTRAMUSCULAR; INTRAVENOUS
Status: DISCONTINUED | OUTPATIENT
Start: 2017-06-15 | End: 2017-06-15

## 2017-06-15 RX ORDER — LORATADINE 10 MG/1
10 TABLET ORAL DAILY
Status: DISCONTINUED | OUTPATIENT
Start: 2017-06-16 | End: 2017-06-16 | Stop reason: HOSPADM

## 2017-06-15 RX ADMIN — FENTANYL CITRATE 50 MCG: 50 INJECTION, SOLUTION INTRAMUSCULAR; INTRAVENOUS at 13:05

## 2017-06-15 RX ADMIN — HEPARIN SODIUM 4000 UNITS: 1000 INJECTION, SOLUTION INTRAVENOUS; SUBCUTANEOUS at 13:11

## 2017-06-15 RX ADMIN — OXYCODONE HYDROCHLORIDE: 5 TABLET ORAL at 15:15

## 2017-06-15 RX ADMIN — VERAPAMIL HYDROCHLORIDE 2.5 MG: 2.5 INJECTION, SOLUTION INTRAVENOUS at 13:11

## 2017-06-15 RX ADMIN — CLINDAMYCIN IN 5 PERCENT DEXTROSE 600 MG: 12 INJECTION, SOLUTION INTRAVENOUS at 12:30

## 2017-06-15 RX ADMIN — MIDAZOLAM 1 MG: 1 INJECTION INTRAMUSCULAR; INTRAVENOUS at 13:05

## 2017-06-15 RX ADMIN — MIDAZOLAM 1 MG: 1 INJECTION INTRAMUSCULAR; INTRAVENOUS at 13:12

## 2017-06-15 RX ADMIN — OXYCODONE HYDROCHLORIDE: 5 TABLET ORAL at 16:59

## 2017-06-15 RX ADMIN — HEPARIN: 100 SYRINGE at 16:29

## 2017-06-15 RX ADMIN — HYDRALAZINE HYDROCHLORIDE: 20 INJECTION INTRAMUSCULAR; INTRAVENOUS at 15:10

## 2017-06-15 RX ADMIN — ONDANSETRON 4 MG: 4 TABLET, ORALLY DISINTEGRATING ORAL at 20:38

## 2017-06-15 RX ADMIN — ONDANSETRON HYDROCHLORIDE 16 MG: 2 SOLUTION INTRAMUSCULAR; INTRAVENOUS at 12:15

## 2017-06-15 RX ADMIN — ONDANSETRON 4 MG: 4 TABLET, ORALLY DISINTEGRATING ORAL at 16:58

## 2017-06-15 RX ADMIN — NITROGLYCERIN 0.5 ML: 20 INJECTION INTRAVENOUS at 13:11

## 2017-06-15 RX ADMIN — SODIUM CHLORIDE: 9 INJECTION, SOLUTION INTRAVENOUS at 11:30

## 2017-06-15 RX ADMIN — DEXAMETHASONE SODIUM PHOSPHATE 12 MG: 4 INJECTION, SOLUTION INTRAMUSCULAR; INTRAVENOUS at 12:00

## 2017-06-15 RX ADMIN — OXYCODONE HYDROCHLORIDE 5 MG: 5 TABLET ORAL at 20:37

## 2017-06-15 RX ADMIN — SODIUM CHLORIDE: 9 INJECTION, SOLUTION INTRAVENOUS at 20:44

## 2017-06-15 ASSESSMENT — PAIN SCALES - GENERAL
PAINLEVEL_OUTOF10: 0
PAINLEVEL_OUTOF10: 4
PAINLEVEL_OUTOF10: 6
PAINLEVEL_OUTOF10: 7
PAINLEVEL_OUTOF10: 4
PAINLEVEL_OUTOF10: 0
PAINLEVEL_OUTOF10: 4
PAINLEVEL_OUTOF10: 0

## 2017-06-15 ASSESSMENT — LIFESTYLE VARIABLES
ALCOHOL_USE: NO
EVER_SMOKED: NEVER

## 2017-06-15 NOTE — IP AVS SNAPSHOT
" Home Care Instructions                                                                                                                  Name:Karlene Walters  Medical Record Number:[id number]  CSN: [id number]    YOB: 1940   Age: 77 y.o.  Sex: female  HT:1.651 m (5' 5\") WT: 75.2 kg (165 lb 12.6 oz)          Admit Date: 6/15/2017     Discharge Date:   Today's Date: 6/16/2017  Attending Doctor:  Jean-Paul Ortiz M.D.                  Allergies:  Codeine; Oxaliplatin; Pcn; Sulfa drugs; and Tape            Discharge Instructions       Discharge Instructions    Discharged to home by car with relative. Discharged via wheelchair, hospital escort: Yes.  Special equipment needed: Not Applicable    Be sure to schedule a follow-up appointment with your primary care doctor or any specialists as instructed.     Discharge Plan:   Diet Plan: Discussed  Activity Level: Discussed  Confirmed Follow up Appointment: Patient to Call and Schedule Appointment  Confirmed Symptoms Management: Discussed  Medication Reconciliation Updated: Yes  Influenza Vaccine Indication: Indicated: Not available from distributor/    I understand that a diet low in cholesterol, fat, and sodium is recommended for good health. Unless I have been given specific instructions below for another diet, I accept this instruction as my diet prescription.   Other diet: resume previous diet    Special Instructions: None    · Is patient discharged on Warfarin / Coumadin?   No     · Is patient Post Blood Transfusion?  No    Depression / Suicide Risk    As you are discharged from this [facility] facility, it is important to learn how to keep safe from harming yourself.    Recognize the warning signs:  · Abrupt changes in personality, positive or negative- including increase in energy   · Giving away possessions  · Change in eating patterns- significant weight changes-  positive or negative  · Change in sleeping patterns- unable to sleep or " sleeping all the time   · Unwillingness or inability to communicate  · Depression  · Unusual sadness, discouragement and loneliness  · Talk of wanting to die  · Neglect of personal appearance   · Rebelliousness- reckless behavior  · Withdrawal from people/activities they love  · Confusion- inability to concentrate     If you or a loved one observes any of these behaviors or has concerns about self-harm, here's what you can do:  · Talk about it- your feelings and reasons for harming yourself  · Remove any means that you might use to hurt yourself (examples: pills, rope, extension cords, firearm)  · Get professional help from the community (Mental Health, Substance Abuse, psychological counseling)  · Do not be alone:Call your Safe Contact- someone whom you trust who will be there for you.  · Call your local CRISIS HOTLINE 530-3174 or 802-538-6952  · Call your local Children's Mobile Crisis Response Team [place] (595) 104-1675 or www.[employer]  · Call the toll free National Suicide Prevention Hotlines   · National Suicide Prevention Lifeline 866-415-RVOQ (5195)  · National Hope Line Network 800-SUICIDE (778-7586)          ACTIVITY: Rest and take it easy for the first 24 hours.  A responsible adult is recommended to remain with you during that time.  It is normal to feel sleepy.  We encourage you to not do anything that requires balance, judgment or coordination.    MILD FLU-LIKE SYMPTOMS ARE NORMAL. YOU MAY EXPERIENCE GENERALIZED MUSCLE ACHES, THROAT IRRITATION, HEADACHE AND/OR SOME NAUSEA.    FOR 24 HOURS DO NOT:  Drive, operate machinery or run household appliances.  Drink beer or alcoholic beverages.   Make important decisions or sign legal documents.    SPECIAL INSTRUCTIONS: follow up with primary care physician as needed  If you experience chest pain, s.o.b, call 145 return to ER  Call Dr Juarez with any questions [phone]    DIET: To avoid nausea, slowly advance diet as tolerated, avoiding spicy or  greasy foods for the first day.  Add more substantial food to your diet according to your physician's instructions.  Babies can be fed formula or breast milk as soon as they are hungry.  INCREASE FLUIDS AND FIBER TO AVOID CONSTIPATION.    SURGICAL DRESSING/BATHING: keep dressing clean and dry, you may remove dressing after 24 hours.    FOLLOW-UP APPOINTMENT:  A follow-up appointment should be arranged with your doctor in [phone]; call to schedule.    You should CALL YOUR PHYSICIAN if you develop:  Fever greater than 101 degrees F.  Pain not relieved by medication, or persistent nausea or vomiting.  Excessive bleeding (blood soaking through dressing) or unexpected drainage from the wound.  Extreme redness or swelling around the incision site, drainage of pus or foul smelling drainage.  Inability to urinate or empty your bladder within 8 hours.  Problems with breathing or chest pain.    You should call 911 if you develop problems with breathing or chest pain.  If you are unable to contact your doctor or surgical center, you should go to the nearest emergency room or urgent care center.  Physician's telephone #: [phone]    If any questions arise, call your doctor.  If your doctor is not available, please feel free to call the Surgical Center at [phone].  The Center is open Monday through Friday from 7AM to 7PM.  You can also call the [employer] HOTLINE open 24 hours/day, 7 days/week and speak to a nurse at [phone], or toll free at [phone].    A registered nurse may call you a few days after your surgery to see how you are doing after your procedure.    MEDICATIONS: Resume taking daily medication.  Take prescribed pain medication with food.  If no medication is prescribed, you may take non-aspirin pain medication if needed.  PAIN MEDICATION CAN BE VERY CONSTIPATING.  Take a stool softener or laxative such as senokot, pericolace, or milk of magnesia if needed.    If your physician has prescribed pain  medication that includes Acetaminophen (Tylenol), do not take additional Acetaminophen (Tylenol) while taking the prescribed medication.    Depression / Suicide Risk    As you are discharged from this [facility] facility, it is important to learn how to keep safe from harming yourself.    Recognize the warning signs:  · Abrupt changes in personality, positive or negative- including increase in energy   · Giving away possessions  · Change in eating patterns- significant weight changes-  positive or negative  · Change in sleeping patterns- unable to sleep or sleeping all the time   · Unwillingness or inability to communicate  · Depression  · Unusual sadness, discouragement and loneliness  · Talk of wanting to die  · Neglect of personal appearance   · Rebelliousness- reckless behavior  · Withdrawal from people/activities they love  · Confusion- inability to concentrate     If you or a loved one observes any of these behaviors or has concerns about self-harm, here's what you can do:  · Talk about it- your feelings and reasons for harming yourself  · Remove any means that you might use to hurt yourself (examples: pills, rope, extension cords, firearm)  · Get professional help from the community (Mental Health, Substance Abuse, psychological counseling)  · Do not be alone:Call your Safe Contact- someone whom you trust who will be there for you.  · Call your local CRISIS HOTLINE 578-8105 or 595-558-1515  · Call your local Children's Mobile Crisis Response Team [place] (823) 249-1783 or www.[employer]  · Call the toll free National Suicide Prevention Hotlines   · National Suicide Prevention Lifeline 777-232-KQPF (9771)  National Hope Line Network 800-SUICIDE (928-4315)  Radial Catherization Discharge Instructions      · Do not subject hand/arm to any forceful movements for 24 hours    i.e. supporting weight when rising from the chair or bed.   · Do not drive a car for 24 hours  · You may remove the dressing  tomorrow  · You may shower on the day following your procedure.  Do not take a tub bath or submerge the puncture site in water for 3 days following the procedure.  · No Lifting more than 3-5 pounds with affected wrist for 5 days  · Follow up with primary care doctor in  2-4 weeks.  · Increase fluids for 2 days post procedure.  · Continue all previous medications unless otherwise instructed.    If bleeding should occur following discharge:  · Sit down and apply firm pressure to site with your fingers for 10 minutes  · If the bleeding stops, continue to sit quietly, keeping your wrist straight for 2 hours.  Notify physician as soon as possible ( [phone])  · If bleeding does not stop after 10 minutes, or if there is a large amount of bleeding or spurting, call 911 immediately.  Do not drive yourself to the hospital.    Your appointments     Jun 27, 2017  8:00 AM   Lab Draw with INFUSION QUICK INJECT   Infusion Services ([facility])    [address] [place] [address]   [phone]            Jun 27, 2017  9:30 AM   ONCOLOGY EST PATIENT 30 MIN with ARTURO Arteaga   [facility] (--)    [address], [address]  [facility] [address]   [phone]            Jun 27, 2017 10:30 AM   Est Chemo 3 with RN 3   Infusion Services ([facility])    [address] [address]   [phone]            Jun 29, 2017  3:00 PM   EST Port Flush / Central Line Care with INFUSION QUICK INJECT   Infusion Services ([facility])    [address] [address]   [phone]            Jul 11, 2017 11:00 AM   Est Chemo 3 with RN 2   Infusion Services ([facility])    [address] [address]   [phone]            Jul 13, 2017  4:30 PM   EST Port Flush / Central Line Care with INFUSION QUICK INJECT   Infusion Services ([facility])    [address] [place] [address]   [phone]            Jul 25, 2017  8:30 AM   Lab Draw with INFUSION QUICK INJECT      Infusion Services ([facility])    [address] [facility] [address]  [place] [address]   [phone]            Jul 25, 2017  9:40 AM   ONCOLOGY EST PATIENT 30 MIN with Eric Hardy M.D.   [facility] (--)    [address], [address]  [place] [address]   [phone]            Jul 25, 2017 11:00 AM   Est Chemo 3 with RN 5   Infusion Services ([facility])    [address] [facility] [address]  [place] [address]   [phone]            Jul 26, 2017  3:00 PM   PREVIOUS PATIENT with JENNIFER Figueredo.   [facility] (--)    [address]  [place] [address]   [phone]            Jul 27, 2017  4:00 PM   EST Port Flush / Central Line Care with INFUSION QUICK INJECT   Infusion Services ([facility])    [address] [facility] [address]  [place] [address]   [phone]            Nov 13, 2017  9:40 AM   Established Patient with Manan Quiñonez M.D.   [facility] [place] [facility] [address]    [address] [facility] [address]  [place] [address]   [phone]           You will be receiving a confirmation call a few days before your appointment from our automated call confirmation system.              Follow-up Information     1. Follow up with Manan Quiñonez M.D. In 2 weeks.    Specialty:  Internal Medicine    Contact information    [address] [place] [address]  [place] [address]  [phone]           Discharge Medication Instructions:    Below are the medications your physician expects you to take upon discharge:    Review all your home medications and newly ordered medications with your doctor and/or pharmacist. Follow medication instructions as directed by your doctor and/or pharmacist.    Please keep your medication list with you and share with your physician.               Medication List      CONTINUE taking these medications        Instructions    Morning Afternoon Evening Bedtime    CENTRUM SILVER PO        Take  by mouth.                        cyanocobalamin 500 MCG  Tabs   Commonly known as:  VITAMIN B-12        Take 500 mcg by mouth every day.   Dose:  500 mcg                        furosemide 20 MG Tabs   Commonly known as:  LASIX        Doctor's comments:  Called to [facility] (take with Potassium)   Take 1 Tab by mouth every day. PRN EDEMA   Dose:  20 mg                        lidocaine-prilocaine 2.5-2.5 % Crea   Commonly known as:  EMLA        APPLY A THICK FILM OVER THE PORT ACCESS SITE PRIOR TO ACCESS                        loperamide 1 MG/5ML Liqd   Commonly known as:  IMODIUM        Take 2 mg by mouth 4 times a day as needed for Diarrhea.   Dose:  2 mg                        loratadine 10 MG Tabs   Last time this was given:  10 mg on 6/16/2017  8:22 AM   Commonly known as:  CLARITIN        Take 10 mg by mouth every day. Indications: Hayfever   Dose:  10 mg                        ondansetron 4 MG Tabs tablet   Commonly known as:  ZOFRAN                             oxycodone immediate-release 5 MG Tabs   Last time this was given:  5 mg on 6/16/2017  8:30 AM   Commonly known as:  ROXICODONE        Take 5 mg by mouth every four hours as needed for Severe Pain.   Dose:  5 mg                        potassium chloride ER 10 MEQ tablet   Commonly known as:  KLOR-CON        Doctor's comments:  Called to [facility] (take with furosemide)   Take 1 Tab by mouth every day.   Dose:  10 mEq                        ranitidine 150 MG Tabs   Commonly known as:  ZANTAC        TAKE 1 TABLET BY MOUTH 2 TIMES A DAY.                        rivaroxaban 20 MG Tabs tablet   Commonly known as:  XARELTO        Take 1 Tab by mouth with dinner.   Dose:  20 mg                        Vitamin D3 400 UNITS Caps        Take 1 Cap by mouth every day.   Dose:  1 Cap                          STOP taking these medications     famotidine 40 MG Tabs   Commonly known as:  PEPCID               ibuprofen 200 MG Tabs   Commonly known as:  MOTRIN                       Instructions           Diet /  Nutrition:    Follow any diet instructions given to you by your doctor or the dietician, including how much salt (sodium) you are allowed each day.    If you are overweight, talk to your doctor about a weight reduction plan.    Activity:    Remain physically active following your doctor's instructions about exercise and activity.    Rest often.     Any time you become even a little tired or short of breath, SIT DOWN and rest.    Worsening Symptoms:    Report any of the following signs and symptoms to the doctor's office immediately:    *Pain of jaw, arm, or neck  *Chest pain not relieved by medication                               *Dizziness or loss of consciousness  *Difficulty breathing even when at rest   *More tired than usual                                       *Bleeding drainage or swelling of surgical site  *Swelling of feet, ankles, legs or stomach                 *Fever (>100ºF)  *Pink or blood tinged sputum  *Weight gain (3lbs/day or 5lbs /week)           *Shock from internal defibrillator (if applicable)  *Palpitations or irregular heartbeats                *Cool and/or numb extremities    Stroke Awareness    Common Risk Factors for Stroke include:    Age  Atrial Fibrillation  Carotid Artery Stenosis  Diabetes Mellitus  Excessive alcohol consumption  High blood pressure  Overweight   Physical inactivity  Smoking    Warning signs and symptoms of a stroke include:    *Sudden numbness or weakness of the face, arm or leg (especially on one side of the body).  *Sudden confusion, trouble speaking or understanding.  *Sudden trouble seeing in one or both eyes.  *Sudden trouble walking, dizziness, loss of balance or coordination.Sudden severe headache with no known cause.    It is very important to get treatment quickly when a stroke occurs. If you experience any of the above warning signs, call 911 immediately.                   Disclaimer         Quit Smoking / Tobacco Use:    I understand the use of any  tobacco products increases my chance of suffering from future heart disease or stroke and could cause other illnesses which may shorten my life. Quitting the use of tobacco products is the single most important thing I can do to improve my health. For further information on smoking / tobacco cessation call a Toll Free Quit Line at 1-830.724.5073 (*National Cancer [place]) or 1-119.237.6698 (American Lung Association) or you can access the web based program at www.lungusa.org.    [place] Tobacco Users Help Line:  (386) 147-1647       Toll Free: 1-474.589.8339  Quit Tobacco Program [facility] ([phone]    Crisis Hotline:    [place] Crisis Hotline:  3-341-IVOGAUM or 1-566.858.3364    [place] Crisis Hotline:    1-510.867.2794 or 645-835-6930    Discharge Survey:   Thank you for choosing [facility]. We hope we did everything we could to make your hospital stay a pleasant one. You may be receiving a phone survey and we would appreciate your time and participation in answering the questions. Your input is very valuable to us in our efforts to improve our service to our patients and their families.        My signature on this form indicates that:    1. I have reviewed and understand the above information.  2. My questions regarding this information have been answered to my satisfaction.  3. I have formulated a plan with my discharge nurse to obtain my prescribed medications for home.                  Disclaimer         __________________________________                     __________       ________                       Patient Signature                                                 Date                    Time

## 2017-06-15 NOTE — IP AVS SNAPSHOT
" <p align=\"LEFT\"><IMG SRC=\"//[email or web address]$/[email or web address]\" alt=\"Image\" WIDTH=\"50%\" HEIGHT=\"200\" BORDER=\"\"></p>                   Name:Karlene Walters  Medical Record Number:[id number]  CSN: [id number]    YOB: 1940   Age: 77 y.o.  Sex: female  HT:1.651 m (5' 5\") WT: 75.2 kg (165 lb 12.6 oz)          Admit Date: 6/15/2017     Discharge Date:   Today's Date: 6/16/2017  Attending Doctor:  Jean-Paul Ortiz M.D.                  Allergies:  Codeine; Oxaliplatin; Pcn; Sulfa drugs; and Tape          Your appointments     Jun 27, 2017  8:00 AM   Lab Draw with INFUSION QUICK INJECT   Infusion Services ([facility])    [address] [address]   [phone]            Jun 27, 2017  9:30 AM   ONCOLOGY EST PATIENT 30 MIN with ARTURO Arteaga   [facility] (--)    [address], [address]  [facility] [address]   [phone]            Jun 27, 2017 10:30 AM   Est Chemo 3 with RN 3   Infusion Services ([facility])    [address] [address]   [phone]            Jun 29, 2017  3:00 PM   EST Port Flush / Central Line Care with INFUSION QUICK INJECT   Infusion Services ([facility])    [address] [address]   [phone]            Jul 11, 2017 11:00 AM   Est Chemo 3 with RN 2   Infusion Services ([facility])    [address] [address]   [phone]            Jul 13, 2017  4:30 PM   EST Port Flush / Central Line Care with INFUSION QUICK INJECT   Infusion Services ([facility])    [address] [address]   [phone]            Jul 25, 2017  8:30 AM   Lab Draw with INFUSION QUICK INJECT   Infusion Services ([facility])    [address] [address]   [phone]            Jul 25, 2017  9:40 AM   ONCOLOGY EST PATIENT 30 MIN with Erci Hardy M.D.   [facility] (--)    [address] [facility]  [place] [address]   [phone]            Jul 25, 2017 11:00 AM   Est Chemo 3 " with RN 5   Infusion Services ([facility])    [address] [facility] [address]  [facility] [address]   [phone]            Jul 26, 2017  3:00 PM   PREVIOUS PATIENT with JENNIFER Figueredo.   [facility] (--)    [address], [address]  [facility] [address]   [phone]            Jul 27, 2017  4:00 PM   EST Port Flush / Central Line Care with INFUSION QUICK INJECT   Infusion Services ([facility])    [address] [facility] [address]  [facility] [address]   [phone]            Nov 13, 2017  9:40 AM   Established Patient with Manan Quiñonez M.D.   [facility] [address]    [address] [facility] [address]  [facility] [address]   [phone]           You will be receiving a confirmation call a few days before your appointment from our automated call confirmation system.              Follow-up Information     1. Follow up with Manan Quiñonez M.D. In 2 weeks.    Specialty:  Internal Medicine    Contact information    [address] [facility] [address]  [facility] [address]  [phone]           Medication List      Take these Medications        Instructions    CENTRUM SILVER PO    Take  by mouth.       cyanocobalamin 500 MCG Tabs   Commonly known as:  VITAMIN B-12    Take 500 mcg by mouth every day.   Dose:  500 mcg       furosemide 20 MG Tabs   Commonly known as:  LASIX    Doctor's comments:  Called to [facility] (take with Potassium)   Take 1 Tab by mouth every day. PRN EDEMA   Dose:  20 mg       lidocaine-prilocaine 2.5-2.5 % Crea   Commonly known as:  EMLA    APPLY A THICK FILM OVER THE PORT ACCESS SITE PRIOR TO ACCESS       loperamide 1 MG/5ML Liqd   Commonly known as:  IMODIUM    Take 2 mg by mouth 4 times a day as needed for Diarrhea.   Dose:  2 mg       loratadine 10 MG Tabs   Commonly known as:  CLARITIN    Take 10 mg by mouth every day. Indications: Hayfever   Dose:  10 mg       ondansetron 4 MG Tabs tablet   Commonly known as:  ZOFRAN        oxycodone immediate-release 5 MG Tabs    Commonly known as:  ROXICODONE    Take 5 mg by mouth every four hours as needed for Severe Pain.   Dose:  5 mg       potassium chloride ER 10 MEQ tablet   Commonly known as:  KLOR-CON    Doctor's comments:  Called to [facility] (take with furosemide)   Take 1 Tab by mouth every day.   Dose:  10 mEq       ranitidine 150 MG Tabs   Commonly known as:  ZANTAC    TAKE 1 TABLET BY MOUTH 2 TIMES A DAY.       rivaroxaban 20 MG Tabs tablet   Commonly known as:  XARELTO    Take 1 Tab by mouth with dinner.   Dose:  20 mg       Vitamin D3 400 UNITS Caps    Take 1 Cap by mouth every day.   Dose:  1 Cap

## 2017-06-15 NOTE — OR SURGEON
Immediate Post- Operative Note        PostOp Diagnosis: mCRC.       Procedure(s): R lobe SIRT.       Estimated Blood Loss: Less than 5 ml        Complications: None            6/15/2017     1400 PM     Neymar Juarez

## 2017-06-15 NOTE — IP AVS SNAPSHOT
" Home Care Instructions                                                                                                                Name:Karlene Walters  Medical Record Number:[id number]  CSN: [id number]    YOB: 1940   Age: 77 y.o.  Sex: female  HT:1.651 m (5' 5\") WT: 75.2 kg (165 lb 12.6 oz)          Admit Date: 6/15/2017     Discharge Date:   Today's Date: 6/15/2017  Attending Doctor:  Neymar Juarez M.D.                  Allergies:  Codeine; Oxaliplatin; Pcn; Sulfa drugs; and Tape                Discharge Instructions         ACTIVITY: Rest and take it easy for the first 24 hours.  A responsible adult is recommended to remain with you during that time.  It is normal to feel sleepy.  We encourage you to not do anything that requires balance, judgment or coordination.    MILD FLU-LIKE SYMPTOMS ARE NORMAL. YOU MAY EXPERIENCE GENERALIZED MUSCLE ACHES, THROAT IRRITATION, HEADACHE AND/OR SOME NAUSEA.    FOR 24 HOURS DO NOT:  Drive, operate machinery or run household appliances.  Drink beer or alcoholic beverages.   Make important decisions or sign legal documents.    SPECIAL INSTRUCTIONS: follow up with primary care physician as needed  If you experience chest pain, s.o.b, call 911 return to ER  Call Dr Juarez with any questions [phone]    DIET: To avoid nausea, slowly advance diet as tolerated, avoiding spicy or greasy foods for the first day.  Add more substantial food to your diet according to your physician's instructions.  Babies can be fed formula or breast milk as soon as they are hungry.  INCREASE FLUIDS AND FIBER TO AVOID CONSTIPATION.    SURGICAL DRESSING/BATHING: keep dressing clean and dry, you may remove dressing after 24 hours.    FOLLOW-UP APPOINTMENT:  A follow-up appointment should be arranged with your doctor in [phone]; call to schedule.    You should CALL YOUR PHYSICIAN if you develop:  Fever greater than 101 degrees F.  Pain not relieved by medication, or persistent nausea or " vomiting.  Excessive bleeding (blood soaking through dressing) or unexpected drainage from the wound.  Extreme redness or swelling around the incision site, drainage of pus or foul smelling drainage.  Inability to urinate or empty your bladder within 8 hours.  Problems with breathing or chest pain.    You should call 911 if you develop problems with breathing or chest pain.  If you are unable to contact your doctor or surgical center, you should go to the nearest emergency room or urgent care center.  Physician's telephone #: [phone]    If any questions arise, call your doctor.  If your doctor is not available, please feel free to call the Surgical Center at [phone].  The Center is open Monday through Friday from 7AM to 7PM.  You can also call the HEALTH HOTLINE open 24 hours/day, 7 days/week and speak to a nurse at [phone], or toll free at [phone].    A registered nurse may call you a few days after your surgery to see how you are doing after your procedure.    MEDICATIONS: Resume taking daily medication.  Take prescribed pain medication with food.  If no medication is prescribed, you may take non-aspirin pain medication if needed.  PAIN MEDICATION CAN BE VERY CONSTIPATING.  Take a stool softener or laxative such as senokot, pericolace, or milk of magnesia if needed.    If your physician has prescribed pain medication that includes Acetaminophen (Tylenol), do not take additional Acetaminophen (Tylenol) while taking the prescribed medication.    Depression / Suicide Risk    As you are discharged from this [facility] facility, it is important to learn how to keep safe from harming yourself.    Recognize the warning signs:  · Abrupt changes in personality, positive or negative- including increase in energy   · Giving away possessions  · Change in eating patterns- significant weight changes-  positive or negative  · Change in sleeping patterns- unable to sleep or sleeping all the  time   · Unwillingness or inability to communicate  · Depression  · Unusual sadness, discouragement and loneliness  · Talk of wanting to die  · Neglect of personal appearance   · Rebelliousness- reckless behavior  · Withdrawal from people/activities they love  · Confusion- inability to concentrate     If you or a loved one observes any of these behaviors or has concerns about self-harm, here's what you can do:  · Talk about it- your feelings and reasons for harming yourself  · Remove any means that you might use to hurt yourself (examples: pills, rope, extension cords, firearm)  · Get professional help from the community (Mental Health, Substance Abuse, psychological counseling)  · Do not be alone:Call your Safe Contact- someone whom you trust who will be there for you.  · Call your local CRISIS HOTLINE 847-2072 or 613-861-8872  · Call your local Children's Mobile Crisis Response Team [place] (881) 450-8810 or www.[employer]  · Call the toll free National Suicide Prevention Hotlines   · National Suicide Prevention Lifeline 661-095-WEMU (0901)  [place] Hope Line Network 800-SUICIDE (710-0024)  Radial Catherization Discharge Instructions      · Do not subject hand/arm to any forceful movements for 24 hours    i.e. supporting weight when rising from the chair or bed.   · Do not drive a car for 24 hours  · You may remove the dressing tomorrow  · You may shower on the day following your procedure.  Do not take a tub bath or submerge the puncture site in water for 3 days following the procedure.  · No Lifting more than 3-5 pounds with affected wrist for 5 days  · Follow up with  ***  2-4 weeks.  · Increase fluids for 2 days post procedure.  · Continue all previous medications unless otherwise instructed.    If bleeding should occur following discharge:  · Sit down and apply firm pressure to site with your fingers for 10 minutes  · If the bleeding stops, continue to sit quietly, keeping your wrist straight  for 2 hours.  Notify physician as soon as possible ( [phone])  · If bleeding does not stop after 10 minutes, or if there is a large amount of bleeding or spurting, call 911 immediately.  Do not drive yourself to the hospital.       Medication List      CONTINUE taking these medications        Instructions    Morning Afternoon Evening Bedtime    CENTRUM SILVER PO        Take  by mouth.                        cyanocobalamin 500 MCG Tabs   Commonly known as:  VITAMIN B-12        Take 500 mcg by mouth every day.   Dose:  500 mcg                        famotidine 40 MG Tabs   Commonly known as:  PEPCID        Take 1 Tab by mouth every day.   Dose:  40 mg                        furosemide 20 MG Tabs   Commonly known as:  LASIX        Doctor's comments:  Called to [facility] (take with Potassium)   Take 1 Tab by mouth every day. PRN EDEMA   Dose:  20 mg                        ibuprofen 200 MG Tabs   Commonly known as:  MOTRIN        Take 200 mg by mouth every 6 hours as needed.   Dose:  200 mg                        lidocaine-prilocaine 2.5-2.5 % Crea   Commonly known as:  EMLA        APPLY A THICK FILM OVER THE PORT ACCESS SITE PRIOR TO ACCESS                        loperamide 1 MG/5ML Liqd   Commonly known as:  IMODIUM        Take 2 mg by mouth 4 times a day as needed for Diarrhea.   Dose:  2 mg                        loratadine 10 MG Tabs   Commonly known as:  CLARITIN        Take 10 mg by mouth every day. Indications: Hayfever   Dose:  10 mg                        ondansetron 4 MG Tabs tablet   Commonly known as:  ZOFRAN                             oxycodone immediate-release 5 MG Tabs   Last time this was given:  6/15/2017  3:15 PM   Commonly known as:  ROXICODONE        Take 5 mg by mouth every four hours as needed for Severe Pain.   Dose:  5 mg                        potassium chloride ER 10 MEQ tablet   Commonly known as:  KLOR-CON        Doctor's comments:  Called to [facility] (take with furosemide)   Take 1  Tab by mouth every day.   Dose:  10 mEq                        ranitidine 150 MG Tabs   Commonly known as:  ZANTAC        TAKE 1 TABLET BY MOUTH 2 TIMES A DAY.                        rivaroxaban 20 MG Tabs tablet   Commonly known as:  XARELTO        Take 1 Tab by mouth with dinner.   Dose:  20 mg                        Vitamin D3 400 UNITS Caps        Take 1 Cap by mouth every day.   Dose:  1 Cap                                Medication Information     Above and/or attached are the medications your physician expects you to take upon discharge. Review all of your home medications and newly ordered medications with your doctor and/or pharmacist. Follow medication instructions as directed by your doctor and/or pharmacist. Please keep your medication list with you and share with your physician. Update the information when medications are discontinued, doses are changed, or new medications (including over-the-counter products) are added; and carry medication information at all times in the event of emergency situations.        Resources     Quit Smoking / Tobacco Use:    I understand the use of any tobacco products increases my chance of suffering from future heart disease or stroke and could cause other illnesses which may shorten my life. Quitting the use of tobacco products is the single most important thing I can do to improve my health. For further information on smoking / tobacco cessation call a Toll Free Quit Line at 1-838.304.3932 (*National Cancer [place]) or 1-748.400.6040 (American Lung Association) or you can access the web based program at www.lungusa.org.    [place] Tobacco Users Help Line:  (321) 248-3198       Toll Free: 1-502.653.4477  Quit Tobacco Program [facility] (208)578-7843    Crisis Hotline:    [place] Crisis Hotline:  5-765-NBCKGJD or 1-118.682.9149    [place] Crisis Hotline:    1-713.797.7308 or 629-095-6323    Discharge Survey:   Thank you for choosing [facility].  We hope we did everything we could to make your hospital stay a pleasant one. You may be receiving a survey and we would appreciate your time and participation in answering the questions. Your input is very valuable to us in our efforts to improve our service to our patients and their families.            Signatures     My signature on this form indicates that:    1. I acknowledge receipt and understanding of these Home Care Instruction.  2. My questions regarding this information have been answered to my satisfaction.  3. I have formulated a plan with my discharge nurse to obtain my prescribed medications for home.    __________________________________      __________________________________                   Patient Signature                                 Guardian/Responsible Adult Signature      __________________________________                 __________       ________                       Nurse Signature                                               Date                 Time

## 2017-06-15 NOTE — IP AVS SNAPSHOT
6/16/2017    Karlene Walters  [address] [place] [address]    Dear Karlene:    [facility] wants to ensure your discharge home is safe and you or your loved ones have had all of your questions answered regarding your care after you leave the hospital.    Below is a list of resources and contact information should you have any questions regarding your hospital stay, follow-up instructions, or active medical symptoms.    Questions or Concerns Regarding… Contact   Medical Questions Related to Your Discharge  (7 days a week, 8am-5pm) Contact a Nurse Care Coordinator   [phone]   Medical Questions Not Related to Your Discharge  (24 hours a day / 7 days a week)  Contact the Nurse Health Line   [phone]    Medications or Discharge Instructions Refer to your discharge packet   or contact your [facility] Primary Care Provider   [phone]   Follow-up Appointment(s) Schedule your appointment via [employer]   or contact Scheduling [phone]   Billing Review your statement via [employer]  or contact Billing [phone]   Medical Records Review your records via [employer]ora   or contact Medical Records [phone]     You may receive a telephone call within two days of discharge. This call is to make certain you understand your discharge instructions and have the opportunity to have any questions answered. You can also easily access your medical information, test results and upcoming appointments via the [employer]ora free online health management tool. You can learn more and sign up at [employer]/Tagora. For assistance setting up your Tagora account, please call [phone].    Once again, we want to ensure your discharge home is safe and that you have a clear understanding of any next steps in your care. If you have any questions or concerns, please do not hesitate to contact us, we are here for you. Thank you for choosing [facility] for your healthcare needs.    Sincerely,    Your [facility] Healthcare Team

## 2017-06-15 NOTE — OR NURSING
1435 patient arrived from IR s/p Y90 mapping left radial TR band clean dry intact, bp high will recheck and medicate, plan of care discussed with patient and family, pt c/o abd pain will medicate per orders.  1507 notified Dr Field of high bp order received for bp medication see MAR   1515 c/o abd pain 4/10 medicated per orders.  1600 hemoband removed gauze and tegaderm applied no bleeding or hematoma noted.  1629 port deaccessed tip intact patient tolerated well.  1635 patient c/o nausea, Dr field at the bed side talking to patient and family, will monitor nausea and medicate per orders.  1659 patient medicated for nausea and pain will monitor   1740 patient tolerating fluids.  1805 paged Dr emery for updates, order received to page hospitalist and admit patient for pain management.  1828 received call from Dr Solorzano per the Doctor will put in orders for admission, phone number for Dr Emery given to Dr Solorzano to call with any questions.  1900 Dr solorzano at the bed side talking to patient and family  1945 report given to Chauncey BOWIE T424 bed 1 all questions answered.  1949 patient transported to room T424 bed 1 with all her personal belongings.

## 2017-06-15 NOTE — IP AVS SNAPSHOT
Raising [employer] Access Code: [email or web address]  Expires: 7/7/2017  4:21 AM    Your email address is not on file at [employer].  Email Addresses are required for you to sign up for [employer], please contact [phone] to verify your personal information and to provide your email address prior to attempting to register for [employer].    Karlene Walters  [address] [place] [address]    Raising IT  A secure, online tool to manage your health information     [employer]’s Raising IT® is a secure, online tool that connects you to your personalized health information from the privacy of your home -- day or night - making it very easy for you to manage your healthcare. Once the activation process is completed, you can even access your medical information using the Raising IT bart, which is available for free in the Apple Bart store or Google Play store.     To learn more about Raising IT, visit [email or web address]    There are two levels of access available (as shown below):   My Chart Features  [facility] [place] Primary Care Doctor [facility]  Specialists [facility]  Urgent  Care Non-[facility] Primary Care Doctor   Email your healthcare team securely and privately 24/7 X X X    Manage appointments: schedule your next appointment; view details of past/upcoming appointments X      Request prescription refills. X      View recent personal medical records, including lab and immunizations X X X X   View health record, including health history, allergies, medications X X X X   Read reports about your outpatient visits, procedures, consult and ER notes X X X X   See your discharge summary, which is a recap of your hospital and/or ER visit that includes your diagnosis, lab results, and care plan X X  X     How to register for [employer]ising [employer]:  Once your e-mail address has been verified, follow the following steps to sign up for [employer]ising [employer].     1. Go to  [email or web address]  2. Click on the Sign Up Now box, which takes you to the New Member Sign Up page. You  will need to provide the following information:  a. Enter your [employer] Access Code exactly as it appears at the top of this page. (You will not need to use this code after you’ve completed the sign-up process. If you do not sign up before the expiration date, you must request a new code.)   b. Enter your date of birth.   c. Enter your home email address.   d. Click Submit, and follow the next screen’s instructions.  3. Create a IR [employer] ID. This will be your [employer] login ID and cannot be changed, so think of one that is secure and easy to remember.  4. Create a [employer] password. You can change your password at any time.  5. Enter your Password Reset Question and Answer. This can be used at a later time if you forget your password.   6. Enter your e-mail address. This allows you to receive e-mail notifications when new information is available in [employer].  7. Click Sign Up. You can now view your health information.    For assistance activating your [employer] account, call [phone]

## 2017-06-15 NOTE — PROGRESS NOTES
Pt underwent Yttrium 90 Selective Internal Radiation Therapy to the Right Hepatic Lobe performed by Dr Juarez. Pt remained hemodynamically stable throughout the procedure. No adverse reaction noted. TR band with 14 mL of air to Left wrist procedure site placed at 1345. Left radial pulse 2+ and cap refill <3 sec on left wrist. Report to AZEB Romano. Pt taken to [facility] in stable condition by AZEB Johnson.

## 2017-06-16 VITALS
WEIGHT: 165.79 LBS | TEMPERATURE: 97.3 F | RESPIRATION RATE: 18 BRPM | BODY MASS INDEX: 27.62 KG/M2 | SYSTOLIC BLOOD PRESSURE: 154 MMHG | HEIGHT: 65 IN | OXYGEN SATURATION: 96 % | DIASTOLIC BLOOD PRESSURE: 83 MMHG | HEART RATE: 89 BPM

## 2017-06-16 LAB
ANION GAP SERPL CALC-SCNC: 11 MMOL/L (ref 0–11.9)
BASOPHILS # BLD AUTO: 0.2 % (ref 0–1.8)
BASOPHILS # BLD: 0.01 K/UL (ref 0–0.12)
BUN SERPL-MCNC: 17 MG/DL (ref 8–22)
CALCIUM SERPL-MCNC: 9.8 MG/DL (ref 8.5–10.5)
CHLORIDE SERPL-SCNC: 105 MMOL/L (ref 96–112)
CO2 SERPL-SCNC: 21 MMOL/L (ref 20–33)
CREAT SERPL-MCNC: 0.99 MG/DL (ref 0.5–1.4)
EOSINOPHIL # BLD AUTO: 0 K/UL (ref 0–0.51)
EOSINOPHIL NFR BLD: 0 % (ref 0–6.9)
ERYTHROCYTE [DISTWIDTH] IN BLOOD BY AUTOMATED COUNT: 48 FL (ref 35.9–50)
GFR SERPL CREATININE-BSD FRML MDRD: 54 ML/MIN/1.73 M 2
GLUCOSE SERPL-MCNC: 145 MG/DL (ref 65–99)
HCT VFR BLD AUTO: 42.3 % (ref 37–47)
HGB BLD-MCNC: 13.9 G/DL (ref 12–16)
IMM GRANULOCYTES # BLD AUTO: 0.03 K/UL (ref 0–0.11)
IMM GRANULOCYTES NFR BLD AUTO: 0.5 % (ref 0–0.9)
LYMPHOCYTES # BLD AUTO: 0.73 K/UL (ref 1–4.8)
LYMPHOCYTES NFR BLD: 11.3 % (ref 22–41)
MCH RBC QN AUTO: 31.6 PG (ref 27–33)
MCHC RBC AUTO-ENTMCNC: 32.9 G/DL (ref 33.6–35)
MCV RBC AUTO: 96.1 FL (ref 81.4–97.8)
MONOCYTES # BLD AUTO: 0.19 K/UL (ref 0–0.85)
MONOCYTES NFR BLD AUTO: 2.9 % (ref 0–13.4)
NEUTROPHILS # BLD AUTO: 5.49 K/UL (ref 2–7.15)
NEUTROPHILS NFR BLD: 85.1 % (ref 44–72)
NRBC # BLD AUTO: 0 K/UL
NRBC BLD AUTO-RTO: 0 /100 WBC
PLATELET # BLD AUTO: 211 K/UL (ref 164–446)
PMV BLD AUTO: 10.5 FL (ref 9–12.9)
POTASSIUM SERPL-SCNC: 3.7 MMOL/L (ref 3.6–5.5)
RBC # BLD AUTO: 4.4 M/UL (ref 4.2–5.4)
SODIUM SERPL-SCNC: 137 MMOL/L (ref 135–145)
WBC # BLD AUTO: 6.5 K/UL (ref 4.8–10.8)

## 2017-06-16 PROCEDURE — 700102 HCHG RX REV CODE 250 W/ 637 OVERRIDE(OP): Performed by: INTERNAL MEDICINE

## 2017-06-16 PROCEDURE — 80048 BASIC METABOLIC PNL TOTAL CA: CPT

## 2017-06-16 PROCEDURE — 99217 PR OBSERVATION CARE DISCHARGE: CPT | Performed by: FAMILY MEDICINE

## 2017-06-16 PROCEDURE — 85025 COMPLETE CBC W/AUTO DIFF WBC: CPT

## 2017-06-16 PROCEDURE — G0378 HOSPITAL OBSERVATION PER HR: HCPCS

## 2017-06-16 PROCEDURE — A9270 NON-COVERED ITEM OR SERVICE: HCPCS | Performed by: INTERNAL MEDICINE

## 2017-06-16 PROCEDURE — 700111 HCHG RX REV CODE 636 W/ 250 OVERRIDE (IP): Performed by: FAMILY MEDICINE

## 2017-06-16 PROCEDURE — A9270 NON-COVERED ITEM OR SERVICE: HCPCS | Performed by: PHARMACIST

## 2017-06-16 PROCEDURE — 700111 HCHG RX REV CODE 636 W/ 250 OVERRIDE (IP): Performed by: INTERNAL MEDICINE

## 2017-06-16 PROCEDURE — 700102 HCHG RX REV CODE 250 W/ 637 OVERRIDE(OP): Performed by: PHARMACIST

## 2017-06-16 PROCEDURE — 36415 COLL VENOUS BLD VENIPUNCTURE: CPT

## 2017-06-16 RX ADMIN — OXYCODONE HYDROCHLORIDE 5 MG: 5 TABLET ORAL at 01:56

## 2017-06-16 RX ADMIN — OXYCODONE HYDROCHLORIDE 5 MG: 5 TABLET ORAL at 05:29

## 2017-06-16 RX ADMIN — OXYCODONE HYDROCHLORIDE 5 MG: 5 TABLET ORAL at 08:30

## 2017-06-16 RX ADMIN — ONDANSETRON 4 MG: 4 TABLET, ORALLY DISINTEGRATING ORAL at 02:02

## 2017-06-16 RX ADMIN — FAMOTIDINE 20 MG: 20 TABLET, FILM COATED ORAL at 08:22

## 2017-06-16 RX ADMIN — LORATADINE 10 MG: 10 TABLET ORAL at 08:22

## 2017-06-16 RX ADMIN — HEPARIN 500 UNITS: 100 SYRINGE at 13:46

## 2017-06-16 ASSESSMENT — PAIN SCALES - GENERAL
PAINLEVEL_OUTOF10: 7
PAINLEVEL_OUTOF10: 6
PAINLEVEL_OUTOF10: 5
PAINLEVEL_OUTOF10: ASSUMED PAIN PRESENT
PAINLEVEL_OUTOF10: 5

## 2017-06-16 NOTE — DISCHARGE INSTRUCTIONS
Discharge Instructions    Discharged to home by car with relative. Discharged via wheelchair, hospital escort: Yes.  Special equipment needed: Not Applicable    Be sure to schedule a follow-up appointment with your primary care doctor or any specialists as instructed.     Discharge Plan:   Diet Plan: Discussed  Activity Level: Discussed  Confirmed Follow up Appointment: Patient to Call and Schedule Appointment  Confirmed Symptoms Management: Discussed  Medication Reconciliation Updated: Yes  Influenza Vaccine Indication: Indicated: Not available from distributor/    I understand that a diet low in cholesterol, fat, and sodium is recommended for good health. Unless I have been given specific instructions below for another diet, I accept this instruction as my diet prescription.   Other diet: resume previous diet    Special Instructions: None    · Is patient discharged on Warfarin / Coumadin?   No     · Is patient Post Blood Transfusion?  No    Depression / Suicide Risk    As you are discharged from this [facility] facility, it is important to learn how to keep safe from harming yourself.    Recognize the warning signs:  · Abrupt changes in personality, positive or negative- including increase in energy   · Giving away possessions  · Change in eating patterns- significant weight changes-  positive or negative  · Change in sleeping patterns- unable to sleep or sleeping all the time   · Unwillingness or inability to communicate  · Depression  · Unusual sadness, discouragement and loneliness  · Talk of wanting to die  · Neglect of personal appearance   · Rebelliousness- reckless behavior  · Withdrawal from people/activities they love  · Confusion- inability to concentrate     If you or a loved one observes any of these behaviors or has concerns about self-harm, here's what you can do:  · Talk about it- your feelings and reasons for harming yourself  · Remove any means that you might use to hurt yourself  (examples: pills, rope, extension cords, firearm)  · Get professional help from the community (Mental Health, Substance Abuse, psychological counseling)  · Do not be alone:Call your Safe Contact- someone whom you trust who will be there for you.  · Call your local CRISIS HOTLINE 293-7149 or 730-114-2589  · Call your local Children's Mobile Crisis Response Team [place] (438) 432-0755 or www.Prylos  · Call the toll free National Suicide Prevention Hotlines   · National Suicide Prevention Lifeline 655-305-VBFK (8199)  · National Hope Line Network 800-SUICIDE (857-9608)          ACTIVITY: Rest and take it easy for the first 24 hours.  A responsible adult is recommended to remain with you during that time.  It is normal to feel sleepy.  We encourage you to not do anything that requires balance, judgment or coordination.    MILD FLU-LIKE SYMPTOMS ARE NORMAL. YOU MAY EXPERIENCE GENERALIZED MUSCLE ACHES, THROAT IRRITATION, HEADACHE AND/OR SOME NAUSEA.    FOR 24 HOURS DO NOT:  Drive, operate machinery or run household appliances.  Drink beer or alcoholic beverages.   Make important decisions or sign legal documents.    SPECIAL INSTRUCTIONS: follow up with primary care physician as needed  If you experience chest pain, s.o.b, call 911 return to ER  Call Dr Juarez with any questions [phone]    DIET: To avoid nausea, slowly advance diet as tolerated, avoiding spicy or greasy foods for the first day.  Add more substantial food to your diet according to your physician's instructions.  Babies can be fed formula or breast milk as soon as they are hungry.  INCREASE FLUIDS AND FIBER TO AVOID CONSTIPATION.    SURGICAL DRESSING/BATHING: keep dressing clean and dry, you may remove dressing after 24 hours.    FOLLOW-UP APPOINTMENT:  A follow-up appointment should be arranged with your doctor in [phone]; call to schedule.    You should CALL YOUR PHYSICIAN if you develop:  Fever greater than 101 degrees F.  Pain not  relieved by medication, or persistent nausea or vomiting.  Excessive bleeding (blood soaking through dressing) or unexpected drainage from the wound.  Extreme redness or swelling around the incision site, drainage of pus or foul smelling drainage.  Inability to urinate or empty your bladder within 8 hours.  Problems with breathing or chest pain.    You should call 911 if you develop problems with breathing or chest pain.  If you are unable to contact your doctor or surgical center, you should go to the nearest emergency room or urgent care center.  Physician's telephone #: [phone]    If any questions arise, call your doctor.  If your doctor is not available, please feel free to call the Surgical Center at [phone].  The Center is open Monday through Friday from 7AM to 7PM.  You can also call the [employer] HOTLINE open 24 hours/day, 7 days/week and speak to a nurse at [phone], or toll free at [phone].    A registered nurse may call you a few days after your surgery to see how you are doing after your procedure.    MEDICATIONS: Resume taking daily medication.  Take prescribed pain medication with food.  If no medication is prescribed, you may take non-aspirin pain medication if needed.  PAIN MEDICATION CAN BE VERY CONSTIPATING.  Take a stool softener or laxative such as senokot, pericolace, or milk of magnesia if needed.    If your physician has prescribed pain medication that includes Acetaminophen (Tylenol), do not take additional Acetaminophen (Tylenol) while taking the prescribed medication.    Depression / Suicide Risk    As you are discharged from this [facility] facility, it is important to learn how to keep safe from harming yourself.    Recognize the warning signs:  · Abrupt changes in personality, positive or negative- including increase in energy   · Giving away possessions  · Change in eating patterns- significant weight changes-  positive or negative  · Change in sleeping patterns-  unable to sleep or sleeping all the time   · Unwillingness or inability to communicate  · Depression  · Unusual sadness, discouragement and loneliness  · Talk of wanting to die  · Neglect of personal appearance   · Rebelliousness- reckless behavior  · Withdrawal from people/activities they love  · Confusion- inability to concentrate     If you or a loved one observes any of these behaviors or has concerns about self-harm, here's what you can do:  · Talk about it- your feelings and reasons for harming yourself  · Remove any means that you might use to hurt yourself (examples: pills, rope, extension cords, firearm)  · Get professional help from the community (Mental Health, Substance Abuse, psychological counseling)  · Do not be alone:Call your Safe Contact- someone whom you trust who will be there for you.  · Call your local CRISIS HOTLINE 496-7589 or 911-497-5059  · Call your local Children's Mobile Crisis Response Team [place] (038) 311-8553 or www.ffk environment  · Call the toll free National Suicide Prevention Hotlines   · National Suicide Prevention Lifeline 280-385-YAOB (5611)  National Hope Line Network 800-SUICIDE (816-5916)  Radial Catherization Discharge Instructions      · Do not subject hand/arm to any forceful movements for 24 hours    i.e. supporting weight when rising from the chair or bed.   · Do not drive a car for 24 hours  · You may remove the dressing tomorrow  · You may shower on the day following your procedure.  Do not take a tub bath or submerge the puncture site in water for 3 days following the procedure.  · No Lifting more than 3-5 pounds with affected wrist for 5 days  · Follow up with primary care doctor in  2-4 weeks.  · Increase fluids for 2 days post procedure.  · Continue all previous medications unless otherwise instructed.    If bleeding should occur following discharge:  · Sit down and apply firm pressure to site with your fingers for 10 minutes  · If the bleeding stops,  continue to sit quietly, keeping your wrist straight for 2 hours.  Notify physician as soon as possible ( [phone])  · If bleeding does not stop after 10 minutes, or if there is a large amount of bleeding or spurting, call 911 immediately.  Do not drive yourself to the hospital.

## 2017-06-16 NOTE — PROGRESS NOTES
Discharge ordered. Paperwork completed and explained to pt. Appt to be scheduled by pt. Follow up information provided.  Pt verbalized understanding of instructions and had no further questions. Pt left unit with SO walking at 1400. Pt has all belongings.

## 2017-06-16 NOTE — H&P
HOSPITAL MEDICINE HISTORY/ PHYSICAL    Date & Time note created:    6/15/2017   10:45 PM       Date & Time Patient was seen:   6/15/2017    Patient ID:   Name: Karlene Walters. YOB: 1940. Age: 77 y.o. female. MRN: [id number]    Admitting Attending:  Danette Solorzano     PCP : Manan Quiñonez M.D.        Chief Complaint:       Right upper abdominal pain    History of Present Illness:    Romeo is a 77 y.o. 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 Apparently patient was noticed to have metastasis  rectal carcinoma to   liver and patient received a selective Internal Radiation Therapy to the Right Hepatic Lobe today, after   procedure patient complained of severe right upper abdominal pain. Patient cannot be discharged home   due to severe pain. Patient is then recommended to be admitted to the hospital for pain control. Patient   otherwise denies fever, chills, nausea vomiting diarrhea, headache or constipation. Patient denies shortness   of breath or chest pain at this moment.     Review of Systems:      per HPI otherwise 14 points reviewed 12 systems neg per AMA/CMS criteria.        Past Medical/ Family / Social history (PFSH):   Past Medical History   Diagnosis Date   • Blood transfusion 1957   • Hypercholesteremia    • Dental disorder      dentures UPPERS AND LOWERS   • Obstruction      ileostomy   • Other specified symptom associated with female genital organs      HYSTERECTOMY 1993   • CATARACT      removed b/l   • Seasonal allergies    • Ileostomy in place (CMS-HCC)    • Fall    • Indigestion    • Arrhythmia    • Lightheadedness 9/17/2015   • Routine general medical examination at a health care facility 3/14/2016     3/14/16   • Renal insufficiency 3/14/2016   • Chemotherapy-induced neutropenia (CMS-HCC) 9/28/2016   • Rectal  adenocarcinoma metastatic to liver (CMS-HCC)    • Cancer (CMS-HCC) 09/2012     rectal cancer,  Liver CA-2015   • Hypertension      no meds currently    • Pneumonia 05/2017     tx'd with antibx   • Hemorrhagic disorder (CMS-HCC)      on Xarelto    • Blood clotting disorder (CMS-HCC) 08/2015     bilat PE      Past Surgical History   Procedure Laterality Date   • Tonsillectomy     • Abdominal hysterectomy total  1983   • Eye surgery       cataracts   • Cyst excision  1972     ovarian   • Other  2009     left eye torn retina repair   • Colonoscopy flex w/endo us  9/20/2012     Performed by Harshil Bolton M.D. at [facility]   • Colectomy  9/26/2012     Performed by Kolton Montgomery M.D. at SURGERY [facility]   • Laparoscopy  10/8/2012     Performed by Kolton Montgomery M.D. at [facility]   • Ileo loop diversion  10/8/2012     Performed by Kolton Montgomery M.D. at [facility]   • Sigmoidoscopy  2/27/2013     Performed by Kolton Montgomery M.D. at [facility]   • Fistula in ano repair  2/27/2013     Performed by Kolton Montgomery M.D. at [facility]   • Proctoscopy  7/17/2013     Performed by Kolton Montgomery M.D. at [facility]   • Biopsy general  7/17/2013     Performed by Kolton Montgomery M.D. at [facility]   • Recovery  8/26/2013     Performed by Cath-Recovery Surgery at SURGERY SAME DAY [facility]   • Fistula in ano repair  9/4/2013     Performed by Kolton Montgomery M.D. at [facility]   • Flap rotation  9/4/2013     Performed by Kolton Montgomery M.D. at [facility]   • Irrigation & debridement general  10/23/2013     Performed by Kolton Montgomery M.D. at [facility]   • Perineal procedure  12/18/2013     Performed by Kolton Montgomery M.D. at [facility]   • Myocutaneous flap  12/18/2013     Performed by Kolton Montgomery M.D. at [facility]   • Cataract extraction with iol  2004     bilateral   •  Irrigation & debridement general  2/19/2014     Performed by Kolton Montgomery M.D. at SURGERY [facility]   • Flap graft  2/19/2014     Performed by Kolton Montgomery M.D. at SURGERY [facility]   • Fistula in ano repair  10/15/2014     Performed by Kolton Montgomery M.D. at SURGERY [facility]   • Perineal procedure  10/15/2014     Performed by Kolton Montgomery M.D. at SURGERY [facility]   • Fistula in ano repair  1/28/2015     Performed by Kolton Montgomery M.D. at SURGERY [facility]   • Perineal procedure  1/28/2015     Performed by Kolton Montgomery M.D. at SURGERY [facility]   • Hepatic ablation laparoscopic  7/6/2015     Procedure: HEPATIC ABLATION LAPAROSCOPIC.;  Surgeon: Kit West M.D.;  Location: SURGERY [facility];  Service:    • Cath placement Left 7/6/2015     Procedure: CATH PLACEMENT CEPHALIC POWERPORT;  Surgeon: Kit West M.D.;  Location: SURGERY [facility];  Service:    • Colonoscopy with biopsy  8/23/2015     Procedure: COLONOSCOPY WITH BIOPSY;  Surgeon: Wilbur Glasgow M.D.;  Location: ENDOSCOPY [facility];  Service:      Current Outpatient Medications:  No current facility-administered medications on file prior to encounter.     Current Outpatient Prescriptions on File Prior to Encounter   Medication Sig Dispense Refill   • ranitidine (ZANTAC) 150 MG Tab TAKE 1 TABLET BY MOUTH 2 TIMES A DAY. 60 Tab 3   • furosemide (LASIX) 20 MG Tab Take 1 Tab by mouth every day. PRN EDEMA 30 Tab 3   • potassium chloride ER (KLOR-CON) 10 MEQ tablet Take 1 Tab by mouth every day. 30 Tab 3   • rivaroxaban (XARELTO) 20 MG Tab tablet Take 1 Tab by mouth with dinner. 30 Tab 6   • famotidine (PEPCID) 40 MG Tab Take 1 Tab by mouth every day. 90 Tab 2   • lidocaine-prilocaine (EMLA) 2.5-2.5 % Cream APPLY A THICK FILM OVER THE PORT ACCESS SITE PRIOR TO ACCESS 30 g 2   • loperamide (IMODIUM) 1 MG/5ML Liquid Take 2 mg by mouth 4 times a day as needed for Diarrhea.     •  "ondansetron (ZOFRAN) 4 MG Tab tablet      • ibuprofen (MOTRIN) 200 MG Tab Take 200 mg by mouth every 6 hours as needed.     • oxycodone immediate-release (ROXICODONE) 5 MG Tab Take 5 mg by mouth every four hours as needed for Severe Pain.     • cyanocobalamin (VITAMIN B-12) 500 MCG Tab Take 500 mcg by mouth every day.     • Multiple Vitamins-Minerals (CENTRUM SILVER PO) Take  by mouth.     • Cholecalciferol (VITAMIN D3) 400 UNITS CAPS Take 1 Cap by mouth every day.     • loratadine (CLARITIN) 10 MG TABS Take 10 mg by mouth every day. Indications: Hayfever       Medication Allergy/Sensitivities:  Allergies   Allergen Reactions   • Codeine      \"gets drunk\"   • Oxaliplatin Anaphylaxis   • Pcn [Penicillins] Itching   • Sulfa Drugs Itching   • Tape Rash     PAPER TAPE OK     Family History:  Family History   Problem Relation Age of Onset   • Heart Disease Mother    • Heart Failure Mother    • Hypertension Mother    • Hyperlipidemia Mother    • Cancer Mother    • Cancer Maternal Aunt 60     breast ca   • Lung Disease Brother      COPD/Emphysema/Smoker   • Cancer Brother      prostate cancer   • Alcohol/Drug Brother      Alcohol   • Kidney Disease Father      renal failure      reviewed and felt non pertinent to this encounter     Social History:  Social History   Substance Use Topics   • Smoking status: Never Smoker    • Smokeless tobacco: Never Used   • Alcohol Use: No      Comment: rare     #################################################################  Physical Exam:   Vitals/ General Appearance:   Weight/BMI: Body mass index is 27.59 kg/(m^2).  Blood pressure 131/78, pulse 76, temperature 36.6 °C (97.9 °F), resp. rate 18, height 1.651 m (5' 5\"), weight 75.2 kg (165 lb 12.6 oz), SpO2 95 %, not currently breastfeeding.   Filed Vitals:    06/15/17 1852 06/15/17 1907 06/15/17 1922 06/15/17 2000   BP:    131/78   Pulse: 73 72 77 76   Temp:    36.6 °C (97.9 °F)   Resp:   18 18   Height:       Weight:       SpO2:  95% " 95%     Oxygen Therapy:  Pulse Oximetry: 95 %, O2 (LPM): 0, O2 Delivery: None (Room Air)    Constitutional:  well developed, well nourished, non-toxic, no acute distress aao x 4  HENMT: Normocephalic, atraumatic, b/l ears normal, nose normal  Eyes:  EOMI, conjunctiva normal, no discharge  Neck: no tracheal deviation, supple  Cardiovascular: normal heart rate, normal rhythm, no murmurs, no rubs or gallops; no cyanosis, clubbing or edema  Lungs: Respiratory effort is normal, normal breath sounds, breath sounds clear to auscultation b/l, no rales, rhonchi or wheezing  Abdomen: soft, RUQ tenderness, no guarding or rebound, active BS, no mass  Skin: warm, dry, no erythema, no rash  Neurologic: Alert and oriented, strength 5/5, no focal deficits, CN II-XII normal  Psychiatric: No anxiety or depression  Lymph node: No lymphadenopathy appreciated in the neck groin and axillary area.   Extremities: Bilateral lower extremities no pitting edema, bilateral pulses symmetric  #################################################################  Lab Data Review:    Objective  No results found for this or any previous visit (from the past 24 hour(s)).    (click the triangle to expand results)    Imaging/Procedures Review:    NM-LIVER/SPLEEN SCAN   Final Result      The prescribed dose was  1.19 gb ( 31.32 mCi). The drawn dose was 1.26 gb (34.1mCi). Delivered dose after residual was measured at 1.25 gb ( 33.75 mCi). This represents  107%% of the prescribed dose and  99% of the drawn dose. Bremsstrahlung images    obtained after the Y-90 radioembolization, confirm proper delivery to the targeted region. There is no uptake outside the planned treatment area. NOTE: The patient will be followed by interventional radiology and oncology.      IR-EMBOLIZATION   Final Result            Assessment and Plan:        1. Right upper quadrant abdominal pain, status post internal radiation therapy for liver cancer  - due to post procedure  - need  pain controll  - iv morphin and po oxy prn  - ivf  - soft diet    2. 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 Hyperlipidemia - cont home meds    4. Hypertension - BP stable  - cont home meds    5. history of bilateral PE on Xarelto - hold AC due to procedure  - need to restart after ok from sx stand point, likely from tmr    6. GERD,- cont H2 blocker    7. chronic pain    8. Prophylaxis: SCDs  9. Code: Full code per patient   10. Dispo: she will be admitted to observation status for management that is expected to take less than 2 midnights

## 2017-06-16 NOTE — CARE PLAN
Problem: Communication  Goal: The ability to communicate needs accurately and effectively will improve  POC discussed with pt within shift.     Problem: Pain Management  Goal: Pain level will decrease to patient’s comfort goal  Pain assessed, medicated per MAR.

## 2017-06-16 NOTE — PROGRESS NOTES
Admitted pt from PACU to [facility] this shift via gurney, stable with no apparent distress noted, no AMS/A&Ox4, VSS. C/o pain/nausea on initial encounter, medicated per MAR. Breathing even/unlabored, noted dim/clear lung sounds to right lung, on RA/denied SOB. Abdomen soft/nontender, (+)hypoBSX4, (-)vomiting, ileostomy appliance in place, (+)output. Tolerating current diet well. 2 RN skin check done, no other skin issues present, skin appears intact. Compression band to left wrist in place, no s/s of poor circulation, educated pt to limit movement on affected extremity per instruction, no active bleeding noted. ROM within functional limits, SBA with ambulation. Oriented with room set-up. POC within shift discussed. Bed on lowest position. Call light within reach. No concerns. Will continue with care.

## 2017-06-16 NOTE — PROGRESS NOTES
Report received, poc discussed, assumed care of pt.   Call light in reach, hourly rounding in place.   Pt gets up SBA.   Gi soft diet.  + void. LBM pta.   Oxy for pain. Controlled overnight.  No further needs.  Sleeping.

## 2017-06-17 NOTE — DISCHARGE SUMMARY
DISCHARGE DIAGNOSES:  1.  Intractable pain.  2.  Metastatic rectal carcinoma to the liver.  3.  Status post elective internal radiation therapy.  4.  History of pulmonary embolism.  5.  Chronic kidney disease, stage III.    CONSULTS:  None.    PROCEDURES:  1.  Status post elective internal radiation therapy.  2.  Nuclear scan of the liver.    HISTORY OF PRESENT ILLNESS:  For detailed H and P, please see Dr. Solorzano's note   on 06/15/2017, brief summary, patient is a 77-year-old white female with   known history of metastatic rectal cancer to the liver.  She has a known   hepatic lesion.  She underwent selective internal radiation therapy to the   lesion today; however, she started having intractable right upper quadrant   abdominal pain.  She was admitted for further evaluation and management.    HOSPITAL COURSE:  Upon admission, ____ was pain control.  This was provided.    Here pain symptoms came down.  She was able to tolerate a regular diet.  She   was able to ambulate without any difficulty.  Other medical problems were not   acute issues during hospital stay.  She will now be discharged improved and in   stable condition.    DISCHARGE MEDICATIONS:  1.  Centrum Silver 1 tab per day.  2.  Vitamin B12 500 mcg per day.  3.  Lasix 20 mg per day.  4.  Emla as needed.  5.  Imodium 2 mg 4 times as needed for diarrhea.  6.  Claritin 10 mg per day.  7.  Zofran 4 mg as needed.  8.  Oxycodone 5 mg every 4 hours as needed for pain.  9.  Klor-Con 10 mEq per day.  10.  Zantac 150 mg twice a day.  11.  Xarelto 20 mg per day.  12.  Vitamin D3 one capsule per day.    DIET:  Regular.    ACTIVITY:  As tolerated.    FOLLOWUP:  Follow up with Dr. Manan Quiñonez in 2 weeks.       ____________________________________     MD PETE RODRIGUEZ / RIKI    DD:  06/16/2017 13:01:43  DT:  06/16/2017 22:01:42    D#:  [id number]  Job#:  [id number]

## 2017-06-26 ENCOUNTER — HOSPITAL ENCOUNTER (OUTPATIENT)
Dept: RADIOLOGY | Facility: MEDICAL CENTER | Age: 77
End: 2017-06-26
Attending: NURSE PRACTITIONER
Payer: MEDICARE

## 2017-06-26 DIAGNOSIS — J18.9 PNEUMONIA DUE TO INFECTIOUS ORGANISM, UNSPECIFIED LATERALITY, UNSPECIFIED PART OF LUNG: ICD-10-CM

## 2017-06-26 DIAGNOSIS — R06.02 SHORTNESS OF BREATH: ICD-10-CM

## 2017-06-26 DIAGNOSIS — C20 RECTAL CANCER (HCC): ICD-10-CM

## 2017-06-26 PROCEDURE — 71020 DX-CHEST-2 VIEWS: CPT

## 2017-06-27 ENCOUNTER — OUTPATIENT INFUSION SERVICES (OUTPATIENT)
Dept: ONCOLOGY | Facility: MEDICAL CENTER | Age: 77
End: 2017-06-27
Attending: NURSE PRACTITIONER
Payer: MEDICARE

## 2017-06-27 ENCOUNTER — NON-PROVIDER VISIT (OUTPATIENT)
Dept: HEMATOLOGY ONCOLOGY | Facility: MEDICAL CENTER | Age: 77
End: 2017-06-27
Payer: MEDICARE

## 2017-06-27 ENCOUNTER — OFFICE VISIT (OUTPATIENT)
Dept: HEMATOLOGY ONCOLOGY | Facility: MEDICAL CENTER | Age: 77
End: 2017-06-27
Payer: MEDICARE

## 2017-06-27 ENCOUNTER — HOSPITAL ENCOUNTER (OUTPATIENT)
Facility: MEDICAL CENTER | Age: 77
End: 2017-06-27
Attending: NURSE PRACTITIONER
Payer: MEDICARE

## 2017-06-27 VITALS
DIASTOLIC BLOOD PRESSURE: 76 MMHG | RESPIRATION RATE: 15 BRPM | OXYGEN SATURATION: 96 % | BODY MASS INDEX: 27.39 KG/M2 | HEART RATE: 76 BPM | TEMPERATURE: 97.5 F | SYSTOLIC BLOOD PRESSURE: 122 MMHG | WEIGHT: 164.57 LBS

## 2017-06-27 VITALS
SYSTOLIC BLOOD PRESSURE: 122 MMHG | OXYGEN SATURATION: 96 % | RESPIRATION RATE: 15 BRPM | TEMPERATURE: 97.5 F | HEART RATE: 76 BPM | DIASTOLIC BLOOD PRESSURE: 76 MMHG

## 2017-06-27 VITALS
DIASTOLIC BLOOD PRESSURE: 89 MMHG | HEIGHT: 65 IN | HEART RATE: 79 BPM | TEMPERATURE: 97 F | SYSTOLIC BLOOD PRESSURE: 137 MMHG | OXYGEN SATURATION: 99 % | WEIGHT: 164.46 LBS | BODY MASS INDEX: 27.4 KG/M2 | RESPIRATION RATE: 18 BRPM

## 2017-06-27 DIAGNOSIS — C18.9 MALIGNANT NEOPLASM OF COLON, UNSPECIFIED PART OF COLON (HCC): ICD-10-CM

## 2017-06-27 DIAGNOSIS — C20 RECTAL CANCER (HCC): ICD-10-CM

## 2017-06-27 DIAGNOSIS — R94.4 DECREASED GFR: ICD-10-CM

## 2017-06-27 DIAGNOSIS — C78.7 SECONDARY MALIGNANT NEOPLASM OF LIVER (HCC): ICD-10-CM

## 2017-06-27 LAB
ALBUMIN SERPL BCP-MCNC: 4.1 G/DL (ref 3.2–4.9)
ALBUMIN/GLOB SERPL: 1.4 G/DL
ALP SERPL-CCNC: 120 U/L (ref 30–99)
ALT SERPL-CCNC: 19 U/L (ref 2–50)
ANION GAP SERPL CALC-SCNC: 6 MMOL/L (ref 0–11.9)
AST SERPL-CCNC: 22 U/L (ref 12–45)
BASOPHILS # BLD AUTO: 1 % (ref 0–1.8)
BASOPHILS # BLD: 0.06 K/UL (ref 0–0.12)
BILIRUB SERPL-MCNC: 0.5 MG/DL (ref 0.1–1.5)
BUN SERPL-MCNC: 19 MG/DL (ref 8–22)
CALCIUM SERPL-MCNC: 9.9 MG/DL (ref 8.5–10.5)
CEA SERPL-MCNC: 14.2 NG/ML (ref 0–3)
CHLORIDE SERPL-SCNC: 107 MMOL/L (ref 96–112)
CO2 SERPL-SCNC: 22 MMOL/L (ref 20–33)
CREAT SERPL-MCNC: 0.87 MG/DL (ref 0.5–1.4)
EOSINOPHIL # BLD AUTO: 0.2 K/UL (ref 0–0.51)
EOSINOPHIL NFR BLD: 3.4 % (ref 0–6.9)
ERYTHROCYTE [DISTWIDTH] IN BLOOD BY AUTOMATED COUNT: 48.5 FL (ref 35.9–50)
GFR SERPL CREATININE-BSD FRML MDRD: >60 ML/MIN/1.73 M 2
GLOBULIN SER CALC-MCNC: 2.9 G/DL (ref 1.9–3.5)
GLUCOSE SERPL-MCNC: 93 MG/DL (ref 65–99)
HCT VFR BLD AUTO: 43.8 % (ref 37–47)
HGB BLD-MCNC: 14.2 G/DL (ref 12–16)
IMM GRANULOCYTES # BLD AUTO: 0.11 K/UL (ref 0–0.11)
IMM GRANULOCYTES NFR BLD AUTO: 1.8 % (ref 0–0.9)
LYMPHOCYTES # BLD AUTO: 0.4 K/UL (ref 1–4.8)
LYMPHOCYTES NFR BLD: 6.7 % (ref 22–41)
MCH RBC QN AUTO: 31.1 PG (ref 27–33)
MCHC RBC AUTO-ENTMCNC: 32.4 G/DL (ref 33.6–35)
MCV RBC AUTO: 96.1 FL (ref 81.4–97.8)
MONOCYTES # BLD AUTO: 0.73 K/UL (ref 0–0.85)
MONOCYTES NFR BLD AUTO: 12.3 % (ref 0–13.4)
NEUTROPHILS # BLD AUTO: 4.45 K/UL (ref 2–7.15)
NEUTROPHILS NFR BLD: 74.8 % (ref 44–72)
NRBC # BLD AUTO: 0 K/UL
NRBC BLD AUTO-RTO: 0 /100 WBC
PLATELET # BLD AUTO: 215 K/UL (ref 164–446)
PMV BLD AUTO: 10 FL (ref 9–12.9)
POTASSIUM SERPL-SCNC: 4.4 MMOL/L (ref 3.6–5.5)
PROT SERPL-MCNC: 7 G/DL (ref 6–8.2)
RBC # BLD AUTO: 4.56 M/UL (ref 4.2–5.4)
SODIUM SERPL-SCNC: 135 MMOL/L (ref 135–145)
WBC # BLD AUTO: 6 K/UL (ref 4.8–10.8)

## 2017-06-27 PROCEDURE — 700105 HCHG RX REV CODE 258

## 2017-06-27 PROCEDURE — 96409 CHEMO IV PUSH SNGL DRUG: CPT

## 2017-06-27 PROCEDURE — 96365 THER/PROPH/DIAG IV INF INIT: CPT

## 2017-06-27 PROCEDURE — 96367 TX/PROPH/DG ADDL SEQ IV INF: CPT

## 2017-06-27 PROCEDURE — G0498 CHEMO EXTEND IV INFUS W/PUMP: HCPCS

## 2017-06-27 PROCEDURE — 36591 DRAW BLOOD OFF VENOUS DEVICE: CPT | Performed by: INTERNAL MEDICINE

## 2017-06-27 PROCEDURE — 700111 HCHG RX REV CODE 636 W/ 250 OVERRIDE (IP): Performed by: NURSE PRACTITIONER

## 2017-06-27 PROCEDURE — 700105 HCHG RX REV CODE 258: Performed by: NURSE PRACTITIONER

## 2017-06-27 PROCEDURE — 96366 THER/PROPH/DIAG IV INF ADDON: CPT

## 2017-06-27 PROCEDURE — 700111 HCHG RX REV CODE 636 W/ 250 OVERRIDE (IP)

## 2017-06-27 PROCEDURE — 99213 OFFICE O/P EST LOW 20 MIN: CPT | Performed by: NURSE PRACTITIONER

## 2017-06-27 PROCEDURE — 96411 CHEMO IV PUSH ADDL DRUG: CPT

## 2017-06-27 PROCEDURE — 96375 TX/PRO/DX INJ NEW DRUG ADDON: CPT

## 2017-06-27 RX ORDER — DEXTROSE MONOHYDRATE 50 MG/ML
INJECTION, SOLUTION INTRAVENOUS CONTINUOUS
Status: DISCONTINUED | OUTPATIENT
Start: 2017-06-27 | End: 2017-06-27 | Stop reason: HOSPADM

## 2017-06-27 RX ORDER — DEXAMETHASONE SODIUM PHOSPHATE 4 MG/ML
8 INJECTION, SOLUTION INTRA-ARTICULAR; INTRALESIONAL; INTRAMUSCULAR; INTRAVENOUS; SOFT TISSUE ONCE
Status: COMPLETED | OUTPATIENT
Start: 2017-06-27 | End: 2017-06-27

## 2017-06-27 RX ADMIN — DEXTROSE MONOHYDRATE: 50 INJECTION, SOLUTION INTRAVENOUS at 10:58

## 2017-06-27 RX ADMIN — FLUOROURACIL 3550 MG: 50 INJECTION, SOLUTION INTRAVENOUS at 13:47

## 2017-06-27 RX ADMIN — DEXAMETHASONE SODIUM PHOSPHATE 8 MG: 4 INJECTION, SOLUTION INTRAMUSCULAR; INTRAVENOUS at 10:58

## 2017-06-27 RX ADMIN — LEUCOVORIN CALCIUM 740 MG: 350 INJECTION, POWDER, LYOPHILIZED, FOR SOLUTION INTRAMUSCULAR; INTRAVENOUS at 11:34

## 2017-06-27 RX ADMIN — FLUOROURACIL 590 MG: 50 INJECTION, SOLUTION INTRAVENOUS at 13:41

## 2017-06-27 ASSESSMENT — ENCOUNTER SYMPTOMS
PALPITATIONS: 0
NAUSEA: 1
SHORTNESS OF BREATH: 0
CHILLS: 0
WEIGHT LOSS: 0
WHEEZING: 0
ABDOMINAL PAIN: 1
TINGLING: 0
FEVER: 0
CONSTIPATION: 0
DIARRHEA: 0
HEARTBURN: 1
COUGH: 0
MYALGIAS: 0
VOMITING: 0
HEADACHES: 0
DIZZINESS: 0

## 2017-06-27 ASSESSMENT — PAIN SCALES - GENERAL
PAINLEVEL: 4=SLIGHT-MODERATE PAIN
PAINLEVEL: 4=SLIGHT-MODERATE PAIN
PAINLEVEL: 2=MINIMAL-SLIGHT

## 2017-06-27 NOTE — PROGRESS NOTES
Chemotherapy Verification - PRIMARY RN      Height = 166cm  Weight = 74.6kg  BSA = 1.85m2       Medication: Leucovorin  Dose: 400mg/m2  Calculated Dose: 740mg                             (In mg/m2, AUC, mg/kg)     Medication: Adrucil bolus  Dose: 320mg/m2  Calculated Dose: 592mg                              (In mg/m2, AUC, mg/kg)    Medication: Adrucil CADD pump  Dose: 1920mg/m2  Calculated Dose: 3552mg                             (In mg/m2, AUC, mg/kg)      I confirm this process was performed independently with the BSA and all final chemotherapy dosing calculations congruent.  Any discrepancies of 5% or greater have been addressed with the chemotherapy pharmacist. The resolution of the discrepancy has been documented in the EPIC progress notes.

## 2017-06-27 NOTE — MR AVS SNAPSHOT
"        Karlene Walters   2017 8:00 AM   Non-Provider Visit   MRN: [id number]    Department:  [facility]   Dept Phone:  [phone]    Description:  Female : 1940   Provider:  ONC RN 1           Reason for Visit     Labs Only           Allergies as of 2017     Allergen Noted Reactions    Codeine 2011       \"gets drunk\"    Oxaliplatin 10/27/2015   Anaphylaxis    Pcn [Penicillins] 2011   Itching    Sulfa Drugs 2011   Itching    Tape 2013   Rash    PAPER TAPE OK      Vital Signs     Smoking Status                   Never Smoker            Basic Information     Date Of Birth Sex Race Ethnicity Preferred Language    1940 Female White Non- English      Your appointments     2017  9:30 AM   ONCOLOGY EST PATIENT 30 MIN with ARTURO Arteaga   [facility] (--)    [address], [address]  [facility] [address]   [phone]            2017 10:30 AM   Est Chemo 3 with RN 3   Infusion Services ([facility])    [address] [address]   [phone]            2017  3:00 PM   EST Port Flush / Central Line Care with INFUSION QUICK INJECT   Infusion Services ([facility])    [address] [address]   [phone]            2017  9:00 AM   IR LOW LEVEL CONSULT with [facility] LOW-LEVEL CONSULT OFFICE   [facility] - INTERVENTIONAL - REGIONAL MEDICAL CTR ([facility])    [facility]  [address] [facility] [address]   [phone]            2017 11:00 AM   Est Chemo 3 with RN 2   Infusion Services ([facility])    [address] [address]   [phone]            2017  4:30 PM   EST Port Flush / Central Line Care with INFUSION QUICK INJECT   Infusion Services ([facility])    [address] [address]   [phone]            2017  8:00 AM   Non Provider 1 with ONC RN 1   [facility] (--)    [address], [address]  [place] [address]   [phone]           You will be receiving a confirmation call a few days before your appointment from our automated call confirmation system.            Jul 25, 2017  9:40 AM   ONCOLOGY EST PATIENT 30 MIN with Eric Hardy M.D.   [facility] (--)    [address], [address]  [place] [address]   [phone]            Jul 25, 2017 11:00 AM   Est Chemo 3 with RN 5   Infusion Services ([facility])    [address] [facility] [address]  [place] [address]   [phone]            Jul 26, 2017  3:00 PM   PREVIOUS PATIENT with BROWN Figueredo   [facility] (--)    [address], [address]  [place] [address]   [phone]            Jul 27, 2017  4:00 PM   EST Port Flush / Central Line Care with INFUSION QUICK INJECT   Infusion Services ([facility])    [address] [facility] [address]  [place] [address]   [phone]            Nov 13, 2017  9:40 AM   Established Patient with Manan Quiñonez M.D.   [facility] [address]    [address]  [place] [address]   [phone]           You will be receiving a confirmation call a few days before your appointment from our automated call confirmation system.              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   10/15/2014 - Present    Thyroid nodule E04.1   12/19/2014 - Present    Secondary malignant neoplasm of liver (CMS-HCC) C78.7    7/6/2015 - Present    Dehydration E86.0   8/14/2015 - Present    Nausea & vomiting R11.2 Medium  8/15/2015 - Present    Colon cancer (CMS-HCC) C18.9   8/16/2015 - Present    Pulmonary embolism (CMS-HCC) I26.99 High  8/18/2015 - Present    Ileitis K52.9   8/24/2015 - Present    Diarrhea R19.7   8/24/2015 - Present    Hyperlipidemia E78.5   9/17/2015 - Present    Lightheadedness R42   9/17/2015 - Present    Shortness of breath R06.02 High  10/5/2015 - Present    Routine general medical examination at a health care facility Z00.00   3/14/2016 - Present    Renal insufficiency N28.9   3/14/2016 - Present    Hyperglycemia R73.9   3/14/2016 - Present    Decreased GFR R94.4   8/16/2016 - Present    Chemotherapy-induced neutropenia (CMS-HCC) D70.1   9/28/2016 - Present    Uncontrolled pain R52   6/15/2017 - Present      Health Maintenance        Date Due Completion Dates    BONE DENSITY 4/13/2005 ---    IMM PNEUMOCOCCAL 65+ (ADULT) LOW/MEDIUM RISK SERIES (2 of 2 - PCV13) 5/3/2018 (Originally 9/5/2007) 9/5/2006    IMM DTaP/Tdap/Td Vaccine (1 - Tdap) 5/3/2018 (Originally [birth year]13/1959) ---    COLONOSCOPY 8/23/2025 8/23/2015, 9/20/2012            Current Immunizations     Influenza TIV (IM) 9/5/2013  6:12 AM, 10/12/2012  6:09 PM    Influenza Vaccine Quad Inj (Preserved) 10/18/2014    Pneumococcal polysaccharide vaccine (PPSV-23) 9/5/2006    SHINGLES VACCINE 1/14/2015      Below and/or attached are the medications your provider expects you to take. Review all of your home medications and newly ordered medications with your provider and/or pharmacist. Follow medication instructions as directed by your provider and/or pharmacist. Please keep your medication list with you and share with your provider. Update the information when medications are discontinued, doses are changed, or new medications (including over-the-counter products) are added; and carry medication information at all times in the event of emergency situations      Allergies:  CODEINE - (reactions not documented)     OXALIPLATIN - Anaphylaxis     PCN - Itching     SULFA DRUGS - Itching     TAPE - Rash               Medications  Valid as of: June 27, 2017 -  8:57 AM    Generic Name Brand Name Tablet Size Instructions for use    Cholecalciferol (Cap) Vitamin D3 400 UNITS Take 1 Cap by mouth every day.        Cyanocobalamin (Tab) VITAMIN B-12 500 MCG Take 500 mcg by mouth every day.        Furosemide (Tab) LASIX 20 MG Take 1 Tab by mouth every day. PRN EDEMA        Lidocaine-Prilocaine (Cream) EMLA 2.5-2.5 % APPLY A THICK FILM OVER THE PORT ACCESS SITE PRIOR TO ACCESS        Loperamide HCl (Liquid) IMODIUM 1 MG/5ML Take 2 mg by mouth 4 times a day as needed for Diarrhea.        Loratadine (Tab) CLARITIN 10 MG Take 10 mg by mouth every day. Indications: Hayfever        Multiple Vitamins-Minerals   Take  by mouth.        Ondansetron HCl (Tab) ZOFRAN 4 MG         OxyCODONE HCl (Tab) ROXICODONE 5 MG Take 5 mg by mouth every four hours as needed for Severe Pain.        Potassium Chloride (Tab CR) KLOR-CON 10 MEQ Take 1 Tab by mouth every day.        RaNITidine HCl (Tab) ZANTAC 150 MG TAKE 1 TABLET BY MOUTH 2 TIMES A DAY.        Rivaroxaban (Tab) XARELTO 20 MG Take 1 Tab by mouth with dinner.        .                 Medicines prescribed today were sent to:     [facility] [employer] [facility] [place], [place] - [address] [facility]    [address] [facility] [address]    Phone: [phone] Fax: [phone]    Open 24 Hours?: No    [facility] [address] [facility], [place] - [address] [facility]    [address] [place] [address]    Phone: [phone] Fax: [phone]    Open 24 Hours?: No      Medication refill instructions:       If your prescription bottle indicates you have medication refills left, it is not necessary to call your provider’s office. Please contact your pharmacy and they will refill your medication.    If your prescription bottle indicates you do not have any refills left,  you may request refills at any time through one of the following ways: The online [employer] system (except Urgent Care), by calling your provider’s office, or by asking your pharmacy to contact your provider’s office with a refill request. Medication refills are processed only during regular business hours and may not be available until the next business day. Your provider may request additional information or to have a follow-up visit with you prior to refilling your medication.   *Please Note: Medication refills are assigned a new Rx number when refilled electronically. Your pharmacy may indicate that no refills were authorized even though a new prescription for the same medication is available at the pharmacy. Please request the medicine by name with the pharmacy before contacting your provider for a refill.           Casa[employer] Access Code: Activation code not generated  Current Casa[employer] Status: Active

## 2017-06-27 NOTE — PROGRESS NOTES
Patient arrived to Infusion for CADD pump access; arrives from appointment w/ CARLOS De.  Received phone call from CARLOS Krishnan, stating she received the ok for treatment, due to her recent Y90 procedure.  Pt delayed treatment due to travelling and Y90 procedure a few weeks ago.  Port previously accessed at MD office, reviewed labs. Within parameters.  Pre-med given, Leucovorin infused per MAR. Pt tolerated well. States improvement with pain, although still feels burning sensation in RUQ at times.  5FU push given, connected to CADD pump for home infusion.  Verified with second RN pump running, with a total volume of 72.5ml (71ml ordered dose) to be given.   De-access appt set for Thursday at 1500.  Pt and  discharged home in good spirits under no apparent distress.

## 2017-06-27 NOTE — PROGRESS NOTES
"Pharmacy Chemotherapy Verification    Patient Name: Karlene Walters      Dx: Colon CA      Protocol: 5-FU+Leucovorin      *Dosing Reference*  Leucovorin 400 mg/m² IV over 2 hours on Day 1, followed by  Fluorouracil 400 mg/m²  IVP on Day 1, followed by    - Dose reduced by 20% to 320 mg/m² starting with Cycle 18 ([place] #11) on 7/5/16 due to neutropenia    Fluorouracil 1200 mg/m²  IV continuous infusion daily on Days 1 and 2 (2400 mg/m² IV over 46-48 hours) -    - Infusion Dose reduced by 20% starting with Cycle 18 ([place] #11) on 7/5/16 due to neutropenia   -20% reduction (1920 mg/m2) to be continued starting C28 per C Alsop APRN   14 day cycles for 12 cycles (adjuvant) or until disease progression or unacceptable toxicity  NCCN Guidelines for Colon Cancer. V.2.2015.  Jay GROVER, et al. Eur J Cancer.1999;35(9):1343-7.      Allergies:  Codeine; Oxaliplatin; Pcn; Sulfa drugs; and Tape     /89 mmHg  Pulse 79  Temp(Src) 36.1 °C (97 °F)  Resp 18  Ht 1.66 m (5' 5.35\")  Wt 74.6 kg (164 lb 7.4 oz)  BMI 27.07 kg/m2  SpO2 99% Body surface area is 1.85 meters squared.    Labs 6/27/17  ANC ~4450  Plt = 215k Hgb = 14.2  SCr = 0.87 mg/dL CrCl ~64 mL/min  AST/ALT/AP/Tbili = 22/19/120/0.5     Drug Order   (Drug name, dose, route, IV Fluid & volume, frequency, number of doses) Cycle: 38 - delayed for   Previous treatment:  5/2/17     Medication = Leucovorin (Wellcovorin)  Base Dose = 400 mg/m²  Calc Dose: Base Dose x 1.85 m² = 740 mg  Final Dose = 740 mg  Route = IV  Fluid & Volume = D5W 250 mL  Admin Duration = Over 120 minutes         <5% difference, OK to treat with final dose   Medication = Fluorouracil (5-FU)   Base Dose = 320 mg/m²  Calc Dose: Base Dose x 1.85 m² = 592 mg  Final Dose = 590 mg  Route = IVP  Fluid & Volume = 11.8 mL in syringe  Conc = 50 mg/mL  Admin Duration = Over 2-5 minutes         <5% difference, OK to treat with final dose   Medication = Fluorouracil (5-FU)   Base Dose = 1920 mg/m²/46 " hours  Calc Dose:Base Dose x 1.85 m² = 3552 mg  Final Dose = 3550 mg  Route = CIVI over 46 hours  Fluid & Volume = CADD pump 71 mL (plus 3 ml overfill)   Conc = 50 mg/mL  Admin Duration = Over 46 hours  Rate = 1.5 mL/hour         <5% difference, OK to treat with final dose     By my signature below, I confirm this process was performed independently with the BSA and all final chemotherapy dosing calculations congruent. I have reviewed the above chemotherapy order and that my calculation of the final dose and BSA (when applicable) corroborate those calculations of the  pharmacist.     Andre Paz, PharmD

## 2017-06-27 NOTE — PROGRESS NOTES
Chemotherapy Verification - SECONDARY RN       Height = 166 cm  Weight = 74.6 kg  BSA = 1.85 m2       Medication: Fluorouracil  Dose: 2400 mg/m2  Calculated Dose: 3552 mg (4440 mg * 0.80)                             (In mg/m2, AUC, mg/kg)     Medication: Fluorouracil push  Dose: 320 mg/m2  Calculated Dose: 592 mg                             (In mg/m2, AUC, mg/kg)      I confirm that this process was performed independently.

## 2017-06-27 NOTE — MR AVS SNAPSHOT
"        Karlene Gilbert Romeo   2017 9:30 AM   Office Visit   MRN: [id number]    Department:  [facility]   Dept Phone:  [phone]    Description:  Female : 1940   Provider:  Ella De A.P.N.           Reason for Visit     Follow-Up prechemo      Allergies as of 2017     Allergen Noted Reactions    Codeine 2011       \"gets drunk\"    Oxaliplatin 10/27/2015   Anaphylaxis    Pcn [Penicillins] 2011   Itching    Sulfa Drugs 2011   Itching    Tape 2013   Rash    PAPER TAPE OK      You were diagnosed with     Rectal cancer (CMS-HCC)   [346098]       Secondary malignant neoplasm of liver (CMS-HCC)   [197.7.ICD-9-CM]         Vital Signs     Blood Pressure Pulse Temperature Respirations Weight Oxygen Saturation    122/76 mmHg 76 36.4 °C (97.5 °F) 15 74.65 kg (164 lb 9.2 oz) 96%    Smoking Status                   Never Smoker            Basic Information     Date Of Birth Sex Race Ethnicity Preferred Language    1940 Female White Non- English      Your appointments     2017 10:30 AM   Est Chemo 3 with RN 3   Infusion Services ([facility])    [address] [address]   [phone]            2017  3:00 PM   EST Port Flush / Central Line Care with INFUSION QUICK INJECT   Infusion Services ([facility])    [address] [address]   [phone]            2017  9:00 AM   IR LOW LEVEL CONSULT with [facility] LOW-LEVEL CONSULT OFFICE   [facility] IMAGING - INTERVENTIONAL - REGIONAL MEDICAL CTR ([facility])    [facility]  [address] [place] [address]   [phone]            2017 11:00 AM   Est Chemo 3 with RN 2   Infusion Services ([facility])    [address] [address]   [phone]            2017  4:30 PM   EST Port Flush / Central Line Care with INFUSION QUICK INJECT   Infusion Services ([facility])    [address] [address]   "   [phone]            Jul 25, 2017  8:00 AM   Non Provider 1 with ONC RN 1   [facility] (--)    [address], [address]  [place] [address]   [phone]           You will be receiving a confirmation call a few days before your appointment from our automated call confirmation system.            Jul 25, 2017  9:40 AM   ONCOLOGY EST PATIENT 30 MIN with Eric Hardy M.D.   [facility] (--)    [address], [address]  [place] [address]   [phone]            Jul 25, 2017 11:00 AM   Est Chemo 3 with RN 5   Infusion Services ([facility])    [address] [facility] [address]  [place] [address]   [phone]            Jul 26, 2017  3:00 PM   PREVIOUS PATIENT with BROWN Figueredo   [facility] (--)    [address]  [place] [address]   [phone]            Jul 27, 2017  4:00 PM   EST Port Flush / Central Line Care with INFUSION QUICK INJECT   Infusion Services ([facility])    [address] [facility] [address]  [place] [address]   [phone]            Nov 13, 2017  9:40 AM   Established Patient with Manan Quiñonez M.D.   [facility] [address]    [address] [facility]  [address]  [place] [address]   [phone]           You will be receiving a confirmation call a few days before your appointment from our automated call confirmation system.              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   10/15/2014 -  Present    Thyroid nodule E04.1   12/19/2014 - Present    Secondary malignant neoplasm of liver (CMS-HCC) C78.7   7/6/2015 - Present    Dehydration E86.0   8/14/2015 - Present    Nausea & vomiting R11.2 Medium  8/15/2015 - Present    Colon cancer (CMS-HCC) C18.9   8/16/2015 - Present    Pulmonary embolism (CMS-HCC) I26.99 High  8/18/2015 - Present    Ileitis K52.9   8/24/2015 - Present    Diarrhea R19.7   8/24/2015 - Present    Hyperlipidemia E78.5   9/17/2015 - Present    Lightheadedness R42   9/17/2015 - Present    Shortness of breath R06.02 High  10/5/2015 - Present    Routine general medical examination at a health care facility Z00.00   3/14/2016 - Present    Renal insufficiency N28.9   3/14/2016 - Present    Hyperglycemia R73.9   3/14/2016 - Present    Decreased GFR R94.4   8/16/2016 - Present    Chemotherapy-induced neutropenia (CMS-HCC) D70.1   9/28/2016 - Present    Uncontrolled pain R52   6/15/2017 - Present      Health Maintenance        Date Due Completion Dates    BONE DENSITY 4/13/2005 ---    IMM PNEUMOCOCCAL 65+ (ADULT) LOW/MEDIUM RISK SERIES (2 of 2 - PCV13) 5/3/2018 (Originally 9/5/2007) 9/5/2006    IMM DTaP/Tdap/Td Vaccine (1 - Tdap) 5/3/2018 (Originally 4/13/1959) ---    COLONOSCOPY 8/23/2025 8/23/2015, 9/20/2012            Current Immunizations     Influenza TIV (IM) 9/5/2013  6:12 AM, 10/12/2012  6:09 PM    Influenza Vaccine Quad Inj (Preserved) 10/18/2014    Pneumococcal polysaccharide vaccine (PPSV-23) 9/5/2006    SHINGLES VACCINE 1/14/2015      Below and/or attached are the medications your provider expects you to take. Review all of your home medications and newly ordered medications with your provider and/or pharmacist. Follow medication instructions as directed by your provider and/or pharmacist. Please keep your medication list with you and share with your provider. Update the information when medications are discontinued, doses are changed, or new medications (including over-the-counter  products) are added; and carry medication information at all times in the event of emergency situations     Allergies:  CODEINE - (reactions not documented)     OXALIPLATIN - Anaphylaxis     PCN - Itching     SULFA DRUGS - Itching     TAPE - Rash               Medications  Valid as of: June 27, 2017 - 10:12 AM    Generic Name Brand Name Tablet Size Instructions for use    Cholecalciferol (Cap) Vitamin D3 400 UNITS Take 1 Cap by mouth every day.        Cyanocobalamin (Tab) VITAMIN B-12 500 MCG Take 500 mcg by mouth every day.        Furosemide (Tab) LASIX 20 MG Take 1 Tab by mouth every day. PRN EDEMA        Lidocaine-Prilocaine (Cream) EMLA 2.5-2.5 % APPLY A THICK FILM OVER THE PORT ACCESS SITE PRIOR TO ACCESS        Loperamide HCl (Liquid) IMODIUM 1 MG/5ML Take 2 mg by mouth 4 times a day as needed for Diarrhea.        Loratadine (Tab) CLARITIN 10 MG Take 10 mg by mouth every day. Indications: Hayfever        Multiple Vitamins-Minerals   Take  by mouth.        Ondansetron HCl (Tab) ZOFRAN 4 MG         OxyCODONE HCl (Tab) ROXICODONE 5 MG Take 5 mg by mouth every four hours as needed for Severe Pain.        Potassium Chloride (Tab CR) KLOR-CON 10 MEQ Take 1 Tab by mouth every day.        RaNITidine HCl (Tab) ZANTAC 150 MG TAKE 1 TABLET BY MOUTH 2 TIMES A DAY.        Rivaroxaban (Tab) XARELTO 20 MG Take 1 Tab by mouth with dinner.        .                 Medicines prescribed today were sent to:     [facility] - [place], [place] - [address] [facility]    [address] [facility] [address]    Phone: [phone] Fax: [phone]    Open 24 Hours?: No    [facility] - [place], [place] - [address] [facility]    [address] [facility] [place] [address]    Phone: [phone] Fax: [phone]    Open 24 Hours?: No      Medication refill instructions:       If your prescription bottle indicates you have medication refills left, it is not necessary to call your provider’s office. Please contact your pharmacy and  they will refill your medication.    If your prescription bottle indicates you do not have any refills left, you may request refills at any time through one of the following ways: The online DIY system (except Urgent Care), by calling your provider’s office, or by asking your pharmacy to contact your provider’s office with a refill request. Medication refills are processed only during regular business hours and may not be available until the next business day. Your provider may request additional information or to have a follow-up visit with you prior to refilling your medication.   *Please Note: Medication refills are assigned a new Rx number when refilled electronically. Your pharmacy may indicate that no refills were authorized even though a new prescription for the same medication is available at the pharmacy. Please request the medicine by name with the pharmacy before contacting your provider for a refill.           DIY Access Code: Activation code not generated  Current DIY Status: Active

## 2017-06-27 NOTE — PROGRESS NOTES
Karlene Walters is a 77 y.o. female here for a non-provider visit for: Lab Draws  on 6/27/2017 at 8:10 AM    Procedure Performed: Pt presents for port access and lab draw prior to her prechemo appointment with ANDREW De.  She is due for chemo in [facility] later this morning.  Pt with EMLA cream to L chest port site.  Port accessed in sterile fashion; brisk blood return noted.  Labs drawn per orders in [place].  Port remains accessed for treatment today.  Pt tolerated well.     Anatomical site: L chest    Equipment used: Port Access Kit    Labs drawn: CBC w/diff, CEA and CMP    Ordering Provider: ANDREW Krishnan    [place] By: Martha Baeza R.N.

## 2017-06-27 NOTE — PROGRESS NOTES
"Subjective:      Karlene Walters is a 77 y.o. female who presents for Follow-Up for rectal cancer metastatic to liver.         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 She is accompanied by her  for today's visit. She is scheduled to receive cycle #29 of single agent 5-FU today.    Patient recently underwent Y90 treatment to the liver on Kasia 15 due to progression of disease in the liver. She tolerated the treatment fairly well. She did experience some burning pain at that time. She stated the pain was persistent last week but it is getting better this week. She stated that the physician, Dr. Juarez changed her Pepcid to Nexium along with Zantac and she is experiencing a little more heartburn to normal. She is due to see Dr. Juarez next week on July 3 for follow-up CT scan at that time.    Patient otherwise has been feeling fairly well. Her appetite has been good. She denies any fevers, chills. She did experience some fatigue and does tire easily however it is improving after her treatment. She denies any chest pain, heart palpitations or swelling in her legs. She denies any coughing, wheezing or shortness of breath. She did have some pneumonia when we completed her CT scan and she did receive antibiotics. She did complete the antibiotic prescription. Her most recent chest x-ray that was completed yesterday showed apparent resolution of the pneumonia.    Patient does experience nausea every once in a while and it will go away without the need of medication. Her bowel movements are normal via her ostomy. She is voiding without difficulty. She does have her persistent wrist pain which she wears a brace for her. She denies any dizziness or peripheral neuropathy.      Allergies   Allergen Reactions   • Codeine      \"gets drunk\"   • Oxaliplatin Anaphylaxis   • Pcn [Penicillins] Itching   • Sulfa Drugs Itching   • Tape Rash     PAPER TAPE OK     Current Outpatient " Prescriptions on File Prior to Visit   Medication Sig Dispense Refill   • ranitidine (ZANTAC) 150 MG Tab TAKE 1 TABLET BY MOUTH 2 TIMES A DAY. 60 Tab 3   • rivaroxaban (XARELTO) 20 MG Tab tablet Take 1 Tab by mouth with dinner. 30 Tab 6   • cyanocobalamin (VITAMIN B-12) 500 MCG Tab Take 500 mcg by mouth every day.     • Multiple Vitamins-Minerals (CENTRUM SILVER PO) Take  by mouth.     • Cholecalciferol (VITAMIN D3) 400 UNITS CAPS Take 1 Cap by mouth every day.     • loratadine (CLARITIN) 10 MG TABS Take 10 mg by mouth every day. Indications: Hayfever     • furosemide (LASIX) 20 MG Tab Take 1 Tab by mouth every day. PRN EDEMA 30 Tab 3   • potassium chloride ER (KLOR-CON) 10 MEQ tablet Take 1 Tab by mouth every day. 30 Tab 3   • lidocaine-prilocaine (EMLA) 2.5-2.5 % Cream APPLY A THICK FILM OVER THE PORT ACCESS SITE PRIOR TO ACCESS 30 g 2   • loperamide (IMODIUM) 1 MG/5ML Liquid Take 2 mg by mouth 4 times a day as needed for Diarrhea.     • ondansetron (ZOFRAN) 4 MG Tab tablet      • oxycodone immediate-release (ROXICODONE) 5 MG Tab Take 5 mg by mouth every four hours as needed for Severe Pain.       No current facility-administered medications on file prior to visit.         Review of Systems   Constitutional: Positive for malaise/fatigue (more present last week but now not so bad - she does tire easily. ). Negative for fever, chills and weight loss.   Respiratory: Negative for cough, shortness of breath and wheezing.    Cardiovascular: Negative for chest pain, palpitations and leg swelling.   Gastrointestinal: Positive for heartburn (increase in heartburn after changing to Nexium), nausea (once in a while but it passes without medication) and abdominal pain (RUQ). Negative for vomiting, diarrhea and constipation.   Genitourinary: Negative for dysuria.   Musculoskeletal: Positive for joint pain (wrist pain still present but it is better. She wears a brace. ). Negative for myalgias.   Skin: Negative for itching and  rash.   Neurological: Negative for dizziness, tingling and headaches.          Objective:     /76 mmHg  Pulse 76  Temp(Src) 36.4 °C (97.5 °F)  Resp 15  Wt 74.65 kg (164 lb 9.2 oz)  SpO2 96%     Physical Exam   Constitutional: She is oriented to person, place, and time. She appears well-developed and well-nourished. No distress.   HENT:   Head: Normocephalic and atraumatic.   Mouth/Throat: Oropharynx is clear and moist. No oropharyngeal exudate.   Eyes: Conjunctivae and EOM are normal. Pupils are equal, round, and reactive to light. Right eye exhibits no discharge. Left eye exhibits no discharge. No scleral icterus.   Neck: Normal range of motion. Neck supple. No thyromegaly present.   Cardiovascular: Normal rate, regular rhythm, normal heart sounds and intact distal pulses.  Exam reveals no gallop and no friction rub.    No murmur heard.  Pulmonary/Chest: Effort normal and breath sounds normal. No respiratory distress. She has no wheezes.   Abdominal: Soft. Bowel sounds are normal. She exhibits no distension. There is no tenderness.   Musculoskeletal: Normal range of motion. She exhibits no edema or tenderness.   Lymphadenopathy:     She has no cervical adenopathy.   Neurological: She is alert and oriented to person, place, and time.   Skin: Skin is warm and dry. No rash noted. She is not diaphoretic. No erythema. No pallor.   Psychiatric: She has a normal mood and affect. Her behavior is normal.   Vitals reviewed.      Dx-chest-2 Views    6/26/2017 6/26/2017 10:33 AM HISTORY/REASON FOR EXAM:  History of colon cancer, follow-up pneumonia TECHNIQUE/EXAM DESCRIPTION AND NUMBER OF VIEWS: Two views of the chest. COMPARISON:  6/1/2017 FINDINGS: Cardiomediastinal contour is within normal limits. No focal pulmonary consolidation. No pleural fluid collection or pneumothorax. LEFT chest infusion port present. Degenerative change of thoracic spine.     6/26/2017  1.  No acute cardiopulmonary disease. 2.  Apparent  resolution of LEFT lower lobe pneumonia.    Dx-chest-2 Views    6/1/2017 6/1/2017 11:27 AM HISTORY/REASON FOR EXAM:  History of rectal cancer. Shortness of breath. Nodular consolidation left lower lobe on chest CT TECHNIQUE/EXAM DESCRIPTION AND NUMBER OF VIEWS: Two views of the chest. COMPARISON:  Chest x-ray March 29, 2016 and chest CT May 22, 2017 FINDINGS: The heart is normal in size. Left-sided Port-A-Cath remains in place. There appears be mild increased density left lower lobe suggesting some minimal residual consolidation as noted on recent chest CT. No pleural effusions are appreciated.     6/1/2017  Apparent mild residual consolidation in the left lower lobe.      Hospital Outpatient Visit on 06/27/2017   Component Date Value Ref Range Status   • WBC 06/27/2017 6.0  4.8 - 10.8 K/uL Final   • RBC 06/27/2017 4.56  4.20 - 5.40 M/uL Final   • Hemoglobin 06/27/2017 14.2  12.0 - 16.0 g/dL Final   • Hematocrit 06/27/2017 43.8  37.0 - 47.0 % Final   • MCV 06/27/2017 96.1  81.4 - 97.8 fL Final   • MCH 06/27/2017 31.1  27.0 - 33.0 pg Final   • MCHC 06/27/2017 32.4* 33.6 - 35.0 g/dL Final   • RDW 06/27/2017 48.5  35.9 - 50.0 fL Final   • Platelet Count 06/27/2017 215  164 - 446 K/uL Final   • MPV 06/27/2017 10.0  9.0 - 12.9 fL Final   • Neutrophils-Polys 06/27/2017 74.80* 44.00 - 72.00 % Final   • Lymphocytes 06/27/2017 6.70* 22.00 - 41.00 % Final   • Monocytes 06/27/2017 12.30  0.00 - 13.40 % Final   • Eosinophils 06/27/2017 3.40  0.00 - 6.90 % Final   • Basophils 06/27/2017 1.00  0.00 - 1.80 % Final   • Immature Granulocytes 06/27/2017 1.80* 0.00 - 0.90 % Final   • Nucleated RBC 06/27/2017 0.00   Final   • Neutrophils (Absolute) 06/27/2017 4.45  2.00 - 7.15 K/uL Final    Includes immature neutrophils, if present.   • Lymphs (Absolute) 06/27/2017 0.40* 1.00 - 4.80 K/uL Final   • Monos (Absolute) 06/27/2017 0.73  0.00 - 0.85 K/uL Final   • Eos (Absolute) 06/27/2017 0.20  0.00 - 0.51 K/uL Final   • Baso (Absolute)  06/27/2017 0.06  0.00 - 0.12 K/uL Final   • Immature Granulocytes (abs) 06/27/2017 0.11  0.00 - 0.11 K/uL Final   • NRBC (Absolute) 06/27/2017 0.00   Final   • Sodium 06/27/2017 135  135 - 145 mmol/L Final   • Potassium 06/27/2017 4.4  3.6 - 5.5 mmol/L Final   • Chloride 06/27/2017 107  96 - 112 mmol/L Final   • Co2 06/27/2017 22  20 - 33 mmol/L Final   • Anion Gap 06/27/2017 6.0  0.0 - 11.9 Final   • Glucose 06/27/2017 93  65 - 99 mg/dL Final   • Bun 06/27/2017 19  8 - 22 mg/dL Final   • Creatinine 06/27/2017 0.87  0.50 - 1.40 mg/dL Final   • Calcium 06/27/2017 9.9  8.5 - 10.5 mg/dL Final   • AST(SGOT) 06/27/2017 22  12 - 45 U/L Final   • ALT(SGPT) 06/27/2017 19  2 - 50 U/L Final   • Alkaline Phosphatase 06/27/2017 120* 30 - 99 U/L Final   • Total Bilirubin 06/27/2017 0.5  0.1 - 1.5 mg/dL Final   • Albumin 06/27/2017 4.1  3.2 - 4.9 g/dL Final   • Total Protein 06/27/2017 7.0  6.0 - 8.2 g/dL Final   • Globulin 06/27/2017 2.9  1.9 - 3.5 g/dL Final   • A-G Ratio 06/27/2017 1.4   Final   • Carcinoembryonic Antigen 06/27/2017 14.2* 0.0 - 3.0 ng/mL Final    Comment: Effective September 17, 2013 the quantitative determination  of Carcinoembryonic Antigen (CEA) will now be performed at  [facility].  The Access CEA paramagnetic  particle chemiluminescent immunoassay is used.  Results  obtained with different test methods or kits cannot be used interchangeably.  Measurement of CEA has been shown to be  clinically relevant in the management of patients with  colorectal, breast, lung, prostatic, pancreatic, and ovarian  carcinomas.  Smokers may have slightly elevated levels of CEA.     • GFR If  06/27/2017 >60  >60 mL/min/1.73 m 2 Final   • GFR If Non  06/27/2017 >60  >60 mL/min/1.73 m 2 Final        Assessment/Plan:     1. Rectal cancer (CMS-HCC)     2. Secondary malignant neoplasm of liver (CMS-HCC)       Plan  1. Patient is scheduled to receive cycle #29 of single agent 5-FU  today. We have held her chemotherapy due to her recent procedure of Y90 directed to the liver. Recommendation was for patient to hold treatment 2 weeks prior and 2 weeks after her treatment. She is 12 days out from completing her procedure. I did reach out to ANDREW Last with radiology to ensure that we're okay to treat her today. I did review her CBC and CMP and okay to proceed with treatment as planned if okay with Delores Mckeon. Patient is clinically stable and feeling well. She does have resolution of her pneumonia as well. I spoke with the infusion nurse and she is aware that we will wait for Delores Mckeon to give us the okay to treat today.    I did hear back from Delores Mckeon and she did review the patient's labs as well as that it was okay to proceed with treatment today.    2. Patient has been changed from Pepcid to Nexium. Patient experiences some increase in heartburn. This change was done by Dr. Juarez. Patient is scheduled to see him on July 3. I discussed with patient to have her talk with him when she sees him next week.    3. Patient is to follow-up in 2 weeks or sooner prior to her next cycle.

## 2017-06-27 NOTE — PROGRESS NOTES
"Pharmacy Chemotherapy Verification    Patient Name: Karlene Walters      Dx: Colon CA     Cycle:  38 ([place] cycle 29)   -- on hold for travel and until Y90 procedure complete(done 6/15/17)  -- ok per ANDREW Spence/Dr. Juarez to proceed with chemotherapy 6/27/17  Previous treatment: 5/2/17    Protocol: 5 FU/Leucovorin    Leucovorin 400 mg/m² IV over 2 hours on Day 1, followed by  Fluorouracil 400 mg/m²  IVP on Day 1, followed by   - Dose reduced by 20% to 320 mg/m² starting with Cycle 18 ([place] #11) on 7/5/16 due to neutropenia    Fluorouracil 1200 mg/m²  IV continuous infusion daily on Days 1 and 2 (2400 mg/m² IV over 46-48 hours) -   - Infusion Dose reduced by 20% starting with Cycle 18 ([place] #11) on 7/5/16 due to neutropenia   -20% reduction (1920 mg/m2) to be continued starting C28 per LLOYD Bergeronp APRN   14 day cycles for 12 cycles (adjuvant) or until disease progression or unacceptable toxicity  NCCN Guidelines for Colon Cancer. V.2.2015.  Jay GROVER, et al. Eur J Cancer.1999;35(9):1343-7.          Allergies: Codeine; Oxaliplatin; Pcn; Sulfa drugs; and Tape  /89 mmHg  Pulse 79  Temp(Src) 36.1 °C (97 °F)  Resp 18  Ht 1.66 m (5' 5.35\")  Wt 74.6 kg (164 lb 7.4 oz)  BMI 27.07 kg/m2  SpO2 99% Body surface area is 1.85 meters squared.     ANC~ 4450 Plt = 215k   Hgb = 14.2     SCr = 0.87mg/dL CrCl ~ 64mL/min   LFT's = WNL, except AP = 120 TBili = 0.5     Leucovorin 400 mg/m² x 1.85m² = 740mg   <5% difference, OK to treat with final dose = 740mg IV    Fluorouracil (5-FU) 320 mg/m² x 1.85m² = 592mg   <5% difference, OK to treat with final dose = 590mg IVP    Fluorouracil (5-FU) 1920 mg/m2 x 1.85m2 = 3552mg   <5% difference, OK to treat with final dose = 3550mg CIVI    over 46 hours via CADD pump at 1.5mL/hr      DARIN Arroyo Pharm.D.          "

## 2017-06-28 NOTE — ADDENDUM NOTE
Encounter addended by: Joseline Monzon R.N. on: 6/28/2017  4:20 PM<BR>     Documentation filed: Inpatient Document Flowsheet

## 2017-06-29 ENCOUNTER — OUTPATIENT INFUSION SERVICES (OUTPATIENT)
Dept: ONCOLOGY | Facility: MEDICAL CENTER | Age: 77
End: 2017-06-29
Attending: NURSE PRACTITIONER
Payer: MEDICARE

## 2017-06-29 VITALS
HEIGHT: 65 IN | WEIGHT: 162.04 LBS | BODY MASS INDEX: 27 KG/M2 | DIASTOLIC BLOOD PRESSURE: 73 MMHG | TEMPERATURE: 97 F | HEART RATE: 84 BPM | SYSTOLIC BLOOD PRESSURE: 112 MMHG | RESPIRATION RATE: 18 BRPM | OXYGEN SATURATION: 96 %

## 2017-06-29 DIAGNOSIS — C20 RECTAL CANCER (HCC): ICD-10-CM

## 2017-06-29 DIAGNOSIS — C18.9 MALIGNANT NEOPLASM OF COLON, UNSPECIFIED PART OF COLON (HCC): ICD-10-CM

## 2017-06-29 LAB
ALBUMIN SERPL BCP-MCNC: 3 G/DL (ref 3.2–4.9)
ALBUMIN/GLOB SERPL: 0.8 G/DL
ALP SERPL-CCNC: 120 U/L (ref 30–99)
ALT SERPL-CCNC: 20 U/L (ref 2–50)
ANION GAP SERPL CALC-SCNC: 11 MMOL/L (ref 0–11.9)
AST SERPL-CCNC: 21 U/L (ref 12–45)
BASOPHILS # BLD AUTO: 0.6 % (ref 0–1.8)
BASOPHILS # BLD: 0.03 K/UL (ref 0–0.12)
BILIRUB SERPL-MCNC: 0.9 MG/DL (ref 0.1–1.5)
BUN SERPL-MCNC: 21 MG/DL (ref 8–22)
CALCIUM SERPL-MCNC: 9.8 MG/DL (ref 8.5–10.5)
CHLORIDE SERPL-SCNC: 104 MMOL/L (ref 96–112)
CO2 SERPL-SCNC: 25 MMOL/L (ref 20–33)
CREAT SERPL-MCNC: 1.05 MG/DL (ref 0.5–1.4)
EOSINOPHIL # BLD AUTO: 0 K/UL (ref 0–0.51)
EOSINOPHIL NFR BLD: 0 % (ref 0–6.9)
ERYTHROCYTE [DISTWIDTH] IN BLOOD BY AUTOMATED COUNT: 48.6 FL (ref 35.9–50)
GFR SERPL CREATININE-BSD FRML MDRD: 51 ML/MIN/1.73 M 2
GLOBULIN SER CALC-MCNC: 3.8 G/DL (ref 1.9–3.5)
GLUCOSE SERPL-MCNC: 127 MG/DL (ref 65–99)
HCT VFR BLD AUTO: 46.3 % (ref 37–47)
HGB BLD-MCNC: 15 G/DL (ref 12–16)
IMM GRANULOCYTES # BLD AUTO: 0.01 K/UL (ref 0–0.11)
IMM GRANULOCYTES NFR BLD AUTO: 0.2 % (ref 0–0.9)
INR PPP: 1.09 (ref 0.87–1.13)
LYMPHOCYTES # BLD AUTO: 0.36 K/UL (ref 1–4.8)
LYMPHOCYTES NFR BLD: 7.3 % (ref 22–41)
MCH RBC QN AUTO: 30.9 PG (ref 27–33)
MCHC RBC AUTO-ENTMCNC: 32.4 G/DL (ref 33.6–35)
MCV RBC AUTO: 95.5 FL (ref 81.4–97.8)
MONOCYTES # BLD AUTO: 0.35 K/UL (ref 0–0.85)
MONOCYTES NFR BLD AUTO: 7.1 % (ref 0–13.4)
NEUTROPHILS # BLD AUTO: 4.16 K/UL (ref 2–7.15)
NEUTROPHILS NFR BLD: 84.8 % (ref 44–72)
NRBC # BLD AUTO: 0 K/UL
NRBC BLD AUTO-RTO: 0 /100 WBC
PLATELET # BLD AUTO: 208 K/UL (ref 164–446)
PMV BLD AUTO: 9.9 FL (ref 9–12.9)
POTASSIUM SERPL-SCNC: 3.7 MMOL/L (ref 3.6–5.5)
PROT SERPL-MCNC: 6.8 G/DL (ref 6–8.2)
PROTHROMBIN TIME: 14.4 SEC (ref 12–14.6)
RBC # BLD AUTO: 4.85 M/UL (ref 4.2–5.4)
SODIUM SERPL-SCNC: 140 MMOL/L (ref 135–145)
WBC # BLD AUTO: 4.9 K/UL (ref 4.8–10.8)

## 2017-06-29 PROCEDURE — 85610 PROTHROMBIN TIME: CPT

## 2017-06-29 PROCEDURE — 80053 COMPREHEN METABOLIC PANEL: CPT

## 2017-06-29 PROCEDURE — 700111 HCHG RX REV CODE 636 W/ 250 OVERRIDE (IP)

## 2017-06-29 PROCEDURE — 36591 DRAW BLOOD OFF VENOUS DEVICE: CPT

## 2017-06-29 PROCEDURE — 85025 COMPLETE CBC W/AUTO DIFF WBC: CPT

## 2017-06-29 RX ADMIN — HEPARIN 500 UNITS: 100 SYRINGE at 15:16

## 2017-06-29 ASSESSMENT — PAIN SCALES - GENERAL: PAINLEVEL: 5=MODERATE PAIN

## 2017-06-29 NOTE — PROGRESS NOTES
Pt arrived to IS, ambulatory, for pump disconnect. Pt states she is in a lot of pain from her recent Y-90 procedure. Pt hand carries in lab slip. Pump stopped upon patient arrival, 0 mL reservoir volume. Cassette disconnected and properly disposed of. Port flushed per policy, positive blood return noted. Labs drawn per MD order. Port flushed and heparin locked per policy, port de-accessed. Pt left IS with no s/sx of distress. Follow up appointment confirmed.

## 2017-07-03 ENCOUNTER — HOSPITAL ENCOUNTER (OUTPATIENT)
Dept: RADIOLOGY | Facility: MEDICAL CENTER | Age: 77
End: 2017-07-03
Attending: RADIOLOGY
Payer: MEDICARE

## 2017-07-03 DIAGNOSIS — C78.7 RECTAL ADENOCARCINOMA METASTATIC TO LIVER (HCC): ICD-10-CM

## 2017-07-03 DIAGNOSIS — C20 RECTAL ADENOCARCINOMA METASTATIC TO LIVER (HCC): ICD-10-CM

## 2017-07-03 NOTE — CONSULTS
Interventional Radiology Follow Up     Re: Karlene Walters     MRN: [id number]   : 1940    Karlene Walters was seen today in follow up after hepatic radioembolization performed by Neymar Juarez MD at [facility] on Kasia 15.  She was referred to our service by Kit West MD and is also under the care of Ella MORALES, Eric Hardy MD, and Manan Quiñonez MD.     has a history of rectal cancer diagnosed in . 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 She has been on chemotherapy and most recently had been on 5FU that was held in May 2017 while she went on a cruise. Follow up imaging revealed a segment 7/8 liver lesion and she underwent a microwave ablation with Dr. West. Post operative imaging revealed a localized recurrence at the ablation site in the hepatic dome. Due to her prior multiple abdominal surgeries and tumor location, the lesion is unresectable and she was referred for evaluation and treatment of the right liver with radioembolization. Dr. Juarez performed mapping on  and embolization of the right lobe of the liver on Kasia 15. The patient's clinical course was remarkable for admission to observation for pain management. She was discharged from [facility] on .    She is seen today for review of labs and imaging studies and the embolization procedure and to plan follow up. Prior to the procedure, her complaints included complains of intermittent bleeding from the perineal tissue, watery stools with the ileostomy, post chemotherapy night sweats and nausea, increased hunger with steroids, and right wrist tendonitis. She denied jaundice or abdominal pain, poor appetite, early satiety, or unintentional weight loss. Today, the patient complains of continued right wrist tendonitis. She started chemo last week and complains of her usual epigastric pain and fatigue after the  "chemo infusion. From the procedure, she complains of a sharp pain with inspiration that has reduced in intensity. She took a couple of oxycodone, which helped. She complained of significant fatigue in the post procedure period and it had improved until her last chemo infusion. She had poor appetite and early satiety that has resolved. She lost \"a couple of pounds\" but states her weight is stable now. She is taking Nexium along with her Zantac. She thinks she is about 90% back to baseline.     Neymar Juarez MD has reviewed 's history and imaging studies, examined the patient, and discussed treatment options.  We discussed the method of the procedure at length and reviewed imaging studies. All questions were answered. The sharp pain with inspiration is likely related to the treatment of the lesion in the dome, causing diaphragmatic irritation. She asked about future refills on oxycodone if needed and we directed her toward her oncology team. We discussed the possibility of tumor recurrence and the need to continue surveillance. She is on a current chemotherapy regimen without a scheduled break . We will coordinate with her other providers to obtain a triple phase liver CT for hepatic mass in 6-8 weeks with labs to evaluate treatment effect.      ANDREW Spencer with Neymar Juarez MD  Interventional Oncology Service   [place] [id number] [facility] ([id number])  [place] [address]  [phone]      "

## 2017-07-11 ENCOUNTER — OUTPATIENT INFUSION SERVICES (OUTPATIENT)
Dept: ONCOLOGY | Facility: MEDICAL CENTER | Age: 77
End: 2017-07-11
Attending: NURSE PRACTITIONER
Payer: MEDICARE

## 2017-07-11 VITALS
HEART RATE: 58 BPM | SYSTOLIC BLOOD PRESSURE: 150 MMHG | OXYGEN SATURATION: 100 % | TEMPERATURE: 97.6 F | RESPIRATION RATE: 18 BRPM | HEIGHT: 65 IN | BODY MASS INDEX: 27.4 KG/M2 | DIASTOLIC BLOOD PRESSURE: 68 MMHG | WEIGHT: 164.46 LBS

## 2017-07-11 DIAGNOSIS — C20 RECTAL CANCER (HCC): ICD-10-CM

## 2017-07-11 LAB
BASOPHILS # BLD AUTO: 0.5 % (ref 0–1.8)
BASOPHILS # BLD: 0.02 K/UL (ref 0–0.12)
EOSINOPHIL # BLD AUTO: 0.15 K/UL (ref 0–0.51)
EOSINOPHIL NFR BLD: 4 % (ref 0–6.9)
ERYTHROCYTE [DISTWIDTH] IN BLOOD BY AUTOMATED COUNT: 49 FL (ref 35.9–50)
HCT VFR BLD AUTO: 40.5 % (ref 37–47)
HGB BLD-MCNC: 13.3 G/DL (ref 12–16)
IMM GRANULOCYTES # BLD AUTO: 0.01 K/UL (ref 0–0.11)
IMM GRANULOCYTES NFR BLD AUTO: 0.3 % (ref 0–0.9)
LYMPHOCYTES # BLD AUTO: 0.44 K/UL (ref 1–4.8)
LYMPHOCYTES NFR BLD: 11.7 % (ref 22–41)
MCH RBC QN AUTO: 31.4 PG (ref 27–33)
MCHC RBC AUTO-ENTMCNC: 32.8 G/DL (ref 33.6–35)
MCV RBC AUTO: 95.7 FL (ref 81.4–97.8)
MONOCYTES # BLD AUTO: 0.51 K/UL (ref 0–0.85)
MONOCYTES NFR BLD AUTO: 13.6 % (ref 0–13.4)
NEUTROPHILS # BLD AUTO: 2.62 K/UL (ref 2–7.15)
NEUTROPHILS NFR BLD: 69.9 % (ref 44–72)
NRBC # BLD AUTO: 0 K/UL
NRBC BLD AUTO-RTO: 0 /100 WBC
PLATELET # BLD AUTO: 161 K/UL (ref 164–446)
PMV BLD AUTO: 10.1 FL (ref 9–12.9)
RBC # BLD AUTO: 4.23 M/UL (ref 4.2–5.4)
WBC # BLD AUTO: 3.8 K/UL (ref 4.8–10.8)

## 2017-07-11 PROCEDURE — 96368 THER/DIAG CONCURRENT INF: CPT

## 2017-07-11 PROCEDURE — 85025 COMPLETE CBC W/AUTO DIFF WBC: CPT

## 2017-07-11 PROCEDURE — 306780 HCHG STAT FOR TRANSFUSION PER CASE

## 2017-07-11 PROCEDURE — 700111 HCHG RX REV CODE 636 W/ 250 OVERRIDE (IP)

## 2017-07-11 PROCEDURE — 700111 HCHG RX REV CODE 636 W/ 250 OVERRIDE (IP): Performed by: NURSE PRACTITIONER

## 2017-07-11 PROCEDURE — G0498 CHEMO EXTEND IV INFUS W/PUMP: HCPCS

## 2017-07-11 PROCEDURE — 700105 HCHG RX REV CODE 258: Performed by: NURSE PRACTITIONER

## 2017-07-11 PROCEDURE — A4212 NON CORING NEEDLE OR STYLET: HCPCS

## 2017-07-11 PROCEDURE — 96367 TX/PROPH/DG ADDL SEQ IV INF: CPT

## 2017-07-11 PROCEDURE — 700105 HCHG RX REV CODE 258

## 2017-07-11 PROCEDURE — 96375 TX/PRO/DX INJ NEW DRUG ADDON: CPT

## 2017-07-11 PROCEDURE — 96409 CHEMO IV PUSH SNGL DRUG: CPT

## 2017-07-11 RX ORDER — DEXTROSE MONOHYDRATE 50 MG/ML
INJECTION, SOLUTION INTRAVENOUS CONTINUOUS
Status: DISCONTINUED | OUTPATIENT
Start: 2017-07-11 | End: 2017-07-11 | Stop reason: HOSPADM

## 2017-07-11 RX ORDER — DEXAMETHASONE SODIUM PHOSPHATE 4 MG/ML
8 INJECTION, SOLUTION INTRA-ARTICULAR; INTRALESIONAL; INTRAMUSCULAR; INTRAVENOUS; SOFT TISSUE ONCE
Status: COMPLETED | OUTPATIENT
Start: 2017-07-11 | End: 2017-07-11

## 2017-07-11 RX ADMIN — FLUOROURACIL 3550 MG: 50 INJECTION, SOLUTION INTRAVENOUS at 15:07

## 2017-07-11 RX ADMIN — DEXTROSE MONOHYDRATE: 50 INJECTION, SOLUTION INTRAVENOUS at 12:41

## 2017-07-11 RX ADMIN — DEXAMETHASONE SODIUM PHOSPHATE 8 MG: 4 INJECTION, SOLUTION INTRAMUSCULAR; INTRAVENOUS at 12:19

## 2017-07-11 RX ADMIN — LEUCOVORIN CALCIUM 740 MG: 350 INJECTION, POWDER, LYOPHILIZED, FOR SOLUTION INTRAMUSCULAR; INTRAVENOUS at 12:40

## 2017-07-11 RX ADMIN — FLUOROURACIL 590 MG: 50 INJECTION, SOLUTION INTRAVENOUS at 14:06

## 2017-07-11 ASSESSMENT — PAIN SCALES - GENERAL: PAINLEVEL: 3=SLIGHT PAIN

## 2017-07-11 NOTE — PROGRESS NOTES
Chemotherapy Verification - SECONDARY RN       Height = 166cm  Weight = 74.6kg  BSA = 1.85m2      Medication: 5FU bolus  Dose: 320mg/m2  Calculated Dose: 592mg                            (In mg/m2, AUC, mg/kg)     I confirm that this process was performed independently.

## 2017-07-11 NOTE — PROGRESS NOTES
"Pharmacy Chemotherapy Verification    Patient Name: Karlene Walters      Dx: Colon CA      Protocol: 5-FU+Leucovorin      *Dosing Reference*  Leucovorin 400 mg/m² IV over 2 hours on Day 1, followed by  Fluorouracil 400 mg/m²  IVP on Day 1, followed by    - Dose reduced by 20% to 320 mg/m² starting with Cycle 18 ([place] #11) on 7/5/16 due to neutropenia    Fluorouracil 1200 mg/m²  IV continuous infusion daily on Days 1 and 2 (2400 mg/m² IV over 46-48 hours) -    - Infusion Dose reduced by 20% starting with Cycle 18 ([place] #11) on 7/5/16 due to neutropenia   -20% reduction (1920 mg/m2) to be continued starting C28 per C Alsop APRN   14 day cycles for 12 cycles (adjuvant) or until disease progression or unacceptable toxicity  NCCN Guidelines for Colon Cancer. V.2.2015.  Jay GROVER, et al. Eur J Cancer.1999;35(9):1343-7.      Allergies:  Codeine; Oxaliplatin; Pcn; Sulfa drugs; and Tape     /68 mmHg  Pulse 58  Temp(Src) 36.4 °C (97.6 °F)  Resp 18  Ht 1.66 m (5' 5.35\")  Wt 74.6 kg (164 lb 7.4 oz)  BMI 27.07 kg/m2  SpO2 100% Body surface area is 1.85 meters squared.      Labs 7/11/17  ANC ~2620  Plt = 161k Hgb = 13.3    6/29/17 SCr = 1.05 mg/dL CrCl ~53 mL/min  AST/ALT/AP/Tbili = 21/20/120/0.9     Drug Order   (Drug name, dose, route, IV Fluid & volume, frequency, number of doses) Cycle: 39 ([place] C31)  Previous treatment:  6/27/17     Medication = Leucovorin (Wellcovorin)  Base Dose = 400 mg/m²  Calc Dose: Base Dose x 1.85 m² = 740 mg  Final Dose = 740 mg  Route = IV  Fluid & Volume = D5W 250 mL  Admin Duration = Over 1 hour         <5% difference, OK to treat with final dose   Medication = Fluorouracil (5-FU)   Base Dose = 320 mg/m²  Calc Dose: Base Dose x 1.85 m² = 592 mg  Final Dose = 590 mg  Route = IVP  Fluid & Volume = 11.8 mL in syringe  Conc = 50 mg/mL  Admin Duration = Over 2-5 minutes         <5% difference, OK to treat with final dose   Medication = Fluorouracil (5-FU)   Base Dose = 1920 mg/m²/46 " hours  Calc Dose:Base Dose x 1.85 m² = 3552 mg  Final Dose = 3550 mg  Route = CIVI over 46 hours  Fluid & Volume = CADD pump 71 mL (plus 3 ml overfill)   Conc = 50 mg/mL  Admin Duration = Over 46 hours  Rate = 1.5 mL/hour         <5% difference, OK to treat with final dose     By my signature below, I confirm this process was performed independently with the BSA and all final chemotherapy dosing calculations congruent. I have reviewed the above chemotherapy order and that my calculation of the final dose and BSA (when applicable) corroborate those calculations of the  pharmacist.     Norris Mitchell, PharmD

## 2017-07-11 NOTE — PROGRESS NOTES
Chemotherapy Verification - SECONDARY RN       Height = 166 cm  Weight = 74.6 kg  BSA = 1.85 mg       Medication: 5FU  Dose: 320 mg/m2 mg/m2  Calculated Dose: 592 mg                             (In mg/m2, AUC, mg/kg)     Medication: home infusion 5FU  Dose: 1920 mg/m2  Calculated Dose: 3552 mg                             (In mg/m2, AUC, mg/kg)      I confirm that this process was performed independently.

## 2017-07-11 NOTE — PROGRESS NOTES
"Pharmacy Chemotherapy Verification    Patient Name: Karlene Walters      Dx: Colon CA     Cycle: 39 ([place] cycle 31)  Previous treatment: 6/27/17    Protocol: 5 FU/Leucovorin    Leucovorin 400 mg/m² IV over 2 hours on Day 1, followed by  Fluorouracil 400 mg/m²  IVP on Day 1, followed by   - Dose reduced by 20% to 320 mg/m² starting with Cycle 18 ([place] #11) on 7/5/16 due to neutropenia    Fluorouracil 1200 mg/m²  IV continuous infusion daily on Days 1 and 2 (2400 mg/m² IV over 46-48 hours) -   - Infusion Dose reduced by 20% starting with Cycle 18 ([place] #11) on 7/5/16 due to neutropenia   -20% reduction (1920 mg/m2) to be continued starting C28 per C Alsop APRN   14 day cycles for 12 cycles (adjuvant) or until disease progression or unacceptable toxicity  NCCN Guidelines for Colon Cancer. V.2.2015.  Jay T, et al. Eur J Cancer.1999;35(9):1343-7.          Allergies: Codeine; Oxaliplatin; Pcn; Sulfa drugs; and Tape  /68 mmHg  Pulse 58  Temp(Src) 36.4 °C (97.6 °F)  Resp 18  Ht 1.66 m (5' 5.35\")  Wt 74.6 kg (164 lb 7.4 oz)  BMI 27.07 kg/m2  SpO2 100% Body surface area is 1.85 meters squared.     ANC~ 2620 Plt = 161k   Hgb = 13.3       6/29/17 (no new CMP today per MD):  SCr = 1.05mg/dL CrCl ~ 53mL/min   LFT's = WNL, except  TBili = 0.9     Leucovorin 400 mg/m² x 1.85m² = 740mg   <5% difference, OK to treat with final dose = 740mg IV    Fluorouracil (5-FU) 320 mg/m² x 1.85m² = 592mg   <5% difference, OK to treat with final dose = 590mg IVP    Fluorouracil (5-FU) 1920 mg/m2 x 1.85m2 = 3552mg   <5% difference, OK to treat with final dose = 3550mg CIVI over 46 hours         Tiffanie Scanlon, PharmD             "

## 2017-07-11 NOTE — PROGRESS NOTES
Pt here for 5 FU push/pump.  Pt states pain is finally better from Y-90.  Port accessed in sterile field, lab drawn, confirmed with APN Alsop, no chemistry today. Premeds and leucovorin given, pt requests 5 FU push to be given residential through the leucovorin.  Pt connected to pump, disconnect apt confirmed.  Pt left IC, no s/s of distress.

## 2017-07-11 NOTE — PROGRESS NOTES
Chemotherapy Verification - PRIMARY RN      Height = 166 cm  Weight = 74.6 kg  BSA = 1.85 m2       Medication: Fluorouracil  Dose: 1920 mg/m2  Calculated Dose: 3552 mg                             (In mg/m2, AUC, mg/kg)     Medication: Fluorouracil push  Dose: 320 mg/m2  Calculated Dose: 592mg                             (In mg/m2, AUC, mg/kg)      I confirm this process was performed independently with the BSA and all final chemotherapy dosing calculations congruent.  Any discrepancies of 5% or greater have been addressed with the chemotherapy pharmacist. The resolution of the discrepancy has been documented in the EPIC progress notes.

## 2017-07-13 ENCOUNTER — OUTPATIENT INFUSION SERVICES (OUTPATIENT)
Dept: ONCOLOGY | Facility: MEDICAL CENTER | Age: 77
End: 2017-07-13
Attending: NURSE PRACTITIONER
Payer: MEDICARE

## 2017-07-13 VITALS
HEIGHT: 65 IN | OXYGEN SATURATION: 96 % | RESPIRATION RATE: 18 BRPM | DIASTOLIC BLOOD PRESSURE: 69 MMHG | WEIGHT: 164.68 LBS | SYSTOLIC BLOOD PRESSURE: 118 MMHG | BODY MASS INDEX: 27.44 KG/M2 | HEART RATE: 85 BPM | TEMPERATURE: 97.5 F

## 2017-07-13 DIAGNOSIS — C20 RECTAL CANCER (HCC): ICD-10-CM

## 2017-07-13 PROCEDURE — 96523 IRRIG DRUG DELIVERY DEVICE: CPT

## 2017-07-13 PROCEDURE — 700111 HCHG RX REV CODE 636 W/ 250 OVERRIDE (IP)

## 2017-07-13 RX ADMIN — HEPARIN 500 UNITS: 100 SYRINGE at 16:54

## 2017-07-13 ASSESSMENT — PAIN SCALES - GENERAL: PAINLEVEL: 1=MINIMAL PAIN

## 2017-07-14 NOTE — PROGRESS NOTES
Pt ambulated into department to be de-accessed from her 5FU infusion pump that ran over 46 hrs. Pt reported tolerating infusion well and without difficulty. Stated that pump stopped prior to arriving to [facility]. When reviewing pump no reservoir volume remained, and it showed that 72.6 ml's was delivered. Pt disconnected from pump, and waste disposed of. Port had + blood return, flushed per [facility] policy, de-accessed, sterile gauze and paper tape used to cover insertion site. Pt's personal pump stored in [facility] pharmacy. Future appointments confirmed with Pt prior to departure, left department appearing in good spirits and NAD.

## 2017-07-24 DIAGNOSIS — C20 RECTAL CANCER (HCC): ICD-10-CM

## 2017-07-25 ENCOUNTER — OFFICE VISIT (OUTPATIENT)
Dept: HEMATOLOGY ONCOLOGY | Facility: MEDICAL CENTER | Age: 77
End: 2017-07-25
Payer: MEDICARE

## 2017-07-25 ENCOUNTER — APPOINTMENT (OUTPATIENT)
Dept: ONCOLOGY | Facility: MEDICAL CENTER | Age: 77
End: 2017-07-25
Payer: MEDICARE

## 2017-07-25 ENCOUNTER — NON-PROVIDER VISIT (OUTPATIENT)
Dept: HEMATOLOGY ONCOLOGY | Facility: MEDICAL CENTER | Age: 77
End: 2017-07-25
Payer: MEDICARE

## 2017-07-25 ENCOUNTER — HOSPITAL ENCOUNTER (OUTPATIENT)
Facility: MEDICAL CENTER | Age: 77
End: 2017-07-25
Attending: NURSE PRACTITIONER
Payer: MEDICARE

## 2017-07-25 ENCOUNTER — OUTPATIENT INFUSION SERVICES (OUTPATIENT)
Dept: ONCOLOGY | Facility: MEDICAL CENTER | Age: 77
End: 2017-07-25
Attending: NURSE PRACTITIONER
Payer: MEDICARE

## 2017-07-25 VITALS
SYSTOLIC BLOOD PRESSURE: 130 MMHG | HEART RATE: 95 BPM | BODY MASS INDEX: 27.6 KG/M2 | TEMPERATURE: 97.7 F | RESPIRATION RATE: 16 BRPM | OXYGEN SATURATION: 96 % | DIASTOLIC BLOOD PRESSURE: 80 MMHG | HEIGHT: 65 IN | WEIGHT: 165.68 LBS

## 2017-07-25 VITALS
HEIGHT: 65 IN | WEIGHT: 165.68 LBS | SYSTOLIC BLOOD PRESSURE: 130 MMHG | HEART RATE: 95 BPM | RESPIRATION RATE: 16 BRPM | BODY MASS INDEX: 27.6 KG/M2 | OXYGEN SATURATION: 96 % | DIASTOLIC BLOOD PRESSURE: 80 MMHG | TEMPERATURE: 97.7 F

## 2017-07-25 VITALS
BODY MASS INDEX: 27.58 KG/M2 | SYSTOLIC BLOOD PRESSURE: 135 MMHG | RESPIRATION RATE: 18 BRPM | HEART RATE: 93 BPM | DIASTOLIC BLOOD PRESSURE: 76 MMHG | OXYGEN SATURATION: 98 % | HEIGHT: 65 IN | TEMPERATURE: 97.5 F | WEIGHT: 165.57 LBS

## 2017-07-25 DIAGNOSIS — R10.11 RIGHT UPPER QUADRANT ABDOMINAL PAIN: ICD-10-CM

## 2017-07-25 DIAGNOSIS — C20 RECTAL CANCER (HCC): ICD-10-CM

## 2017-07-25 DIAGNOSIS — C18.9 MALIGNANT NEOPLASM OF COLON, UNSPECIFIED PART OF COLON (HCC): ICD-10-CM

## 2017-07-25 DIAGNOSIS — R94.4 DECREASED GFR: ICD-10-CM

## 2017-07-25 LAB
ALBUMIN SERPL BCP-MCNC: 3.7 G/DL (ref 3.2–4.9)
ALBUMIN/GLOB SERPL: 1.3 G/DL
ALP SERPL-CCNC: 165 U/L (ref 30–99)
ALT SERPL-CCNC: 31 U/L (ref 2–50)
ANION GAP SERPL CALC-SCNC: 9 MMOL/L (ref 0–11.9)
AST SERPL-CCNC: 34 U/L (ref 12–45)
BASOPHILS # BLD AUTO: 2.3 % (ref 0–1.8)
BASOPHILS # BLD: 0.06 K/UL (ref 0–0.12)
BILIRUB SERPL-MCNC: 0.6 MG/DL (ref 0.1–1.5)
BUN SERPL-MCNC: 17 MG/DL (ref 8–22)
CALCIUM SERPL-MCNC: 9.6 MG/DL (ref 8.5–10.5)
CEA SERPL-MCNC: 6.6 NG/ML (ref 0–3)
CHLORIDE SERPL-SCNC: 108 MMOL/L (ref 96–112)
CO2 SERPL-SCNC: 23 MMOL/L (ref 20–33)
CREAT SERPL-MCNC: 0.97 MG/DL (ref 0.5–1.4)
EOSINOPHIL # BLD AUTO: 0.11 K/UL (ref 0–0.51)
EOSINOPHIL NFR BLD: 4.2 % (ref 0–6.9)
ERYTHROCYTE [DISTWIDTH] IN BLOOD BY AUTOMATED COUNT: 50.6 FL (ref 35.9–50)
GFR SERPL CREATININE-BSD FRML MDRD: 56 ML/MIN/1.73 M 2
GLOBULIN SER CALC-MCNC: 2.8 G/DL (ref 1.9–3.5)
GLUCOSE SERPL-MCNC: 111 MG/DL (ref 65–99)
HCT VFR BLD AUTO: 39.5 % (ref 37–47)
HGB BLD-MCNC: 13 G/DL (ref 12–16)
IMM GRANULOCYTES # BLD AUTO: 0.02 K/UL (ref 0–0.11)
IMM GRANULOCYTES NFR BLD AUTO: 0.8 % (ref 0–0.9)
LYMPHOCYTES # BLD AUTO: 0.43 K/UL (ref 1–4.8)
LYMPHOCYTES NFR BLD: 16.5 % (ref 22–41)
MCH RBC QN AUTO: 31.1 PG (ref 27–33)
MCHC RBC AUTO-ENTMCNC: 32.9 G/DL (ref 33.6–35)
MCV RBC AUTO: 94.5 FL (ref 81.4–97.8)
MONOCYTES # BLD AUTO: 0.5 K/UL (ref 0–0.85)
MONOCYTES NFR BLD AUTO: 19.2 % (ref 0–13.4)
NEUTROPHILS # BLD AUTO: 1.48 K/UL (ref 2–7.15)
NEUTROPHILS NFR BLD: 57 % (ref 44–72)
NRBC # BLD AUTO: 0 K/UL
NRBC BLD AUTO-RTO: 0 /100 WBC
PLATELET # BLD AUTO: 175 K/UL (ref 164–446)
PMV BLD AUTO: 9.7 FL (ref 9–12.9)
POTASSIUM SERPL-SCNC: 3.6 MMOL/L (ref 3.6–5.5)
PROT SERPL-MCNC: 6.5 G/DL (ref 6–8.2)
RBC # BLD AUTO: 4.18 M/UL (ref 4.2–5.4)
SODIUM SERPL-SCNC: 140 MMOL/L (ref 135–145)
WBC # BLD AUTO: 2.6 K/UL (ref 4.8–10.8)

## 2017-07-25 PROCEDURE — 96375 TX/PRO/DX INJ NEW DRUG ADDON: CPT

## 2017-07-25 PROCEDURE — G0498 CHEMO EXTEND IV INFUS W/PUMP: HCPCS

## 2017-07-25 PROCEDURE — 700111 HCHG RX REV CODE 636 W/ 250 OVERRIDE (IP)

## 2017-07-25 PROCEDURE — 96366 THER/PROPH/DIAG IV INF ADDON: CPT

## 2017-07-25 PROCEDURE — 96367 TX/PROPH/DG ADDL SEQ IV INF: CPT

## 2017-07-25 PROCEDURE — 96409 CHEMO IV PUSH SNGL DRUG: CPT

## 2017-07-25 PROCEDURE — 36591 DRAW BLOOD OFF VENOUS DEVICE: CPT | Performed by: NURSE PRACTITIONER

## 2017-07-25 PROCEDURE — 700105 HCHG RX REV CODE 258: Performed by: NURSE PRACTITIONER

## 2017-07-25 PROCEDURE — 99213 OFFICE O/P EST LOW 20 MIN: CPT | Performed by: NURSE PRACTITIONER

## 2017-07-25 PROCEDURE — 700111 HCHG RX REV CODE 636 W/ 250 OVERRIDE (IP): Performed by: NURSE PRACTITIONER

## 2017-07-25 PROCEDURE — 96365 THER/PROPH/DIAG IV INF INIT: CPT

## 2017-07-25 RX ORDER — DEXTROSE MONOHYDRATE 50 MG/ML
INJECTION, SOLUTION INTRAVENOUS CONTINUOUS
Status: DISCONTINUED | OUTPATIENT
Start: 2017-07-25 | End: 2017-07-25 | Stop reason: HOSPADM

## 2017-07-25 RX ORDER — DEXAMETHASONE SODIUM PHOSPHATE 4 MG/ML
8 INJECTION, SOLUTION INTRA-ARTICULAR; INTRALESIONAL; INTRAMUSCULAR; INTRAVENOUS; SOFT TISSUE ONCE
Status: COMPLETED | OUTPATIENT
Start: 2017-07-25 | End: 2017-07-25

## 2017-07-25 RX ORDER — OXYCODONE HYDROCHLORIDE 5 MG/1
5 TABLET ORAL EVERY 4 HOURS PRN
Qty: 30 TAB | Refills: 0 | Status: SHIPPED | OUTPATIENT
Start: 2017-07-25 | End: 2017-10-30 | Stop reason: SDUPTHER

## 2017-07-25 RX ADMIN — DEXTROSE MONOHYDRATE: 50 INJECTION, SOLUTION INTRAVENOUS at 11:25

## 2017-07-25 RX ADMIN — DEXAMETHASONE SODIUM PHOSPHATE 8 MG: 4 INJECTION, SOLUTION INTRAMUSCULAR; INTRAVENOUS at 11:25

## 2017-07-25 RX ADMIN — FLUOROURACIL 3570 MG: 50 INJECTION, SOLUTION INTRAVENOUS at 14:06

## 2017-07-25 RX ADMIN — LEUCOVORIN CALCIUM 744 MG: 350 INJECTION, POWDER, LYOPHILIZED, FOR SOLUTION INTRAMUSCULAR; INTRAVENOUS at 12:04

## 2017-07-25 RX ADMIN — FLUOROURACIL 595 MG: 50 INJECTION, SOLUTION INTRAVENOUS at 13:03

## 2017-07-25 ASSESSMENT — ENCOUNTER SYMPTOMS
NAUSEA: 1
ABDOMINAL PAIN: 1
HEADACHES: 0
SHORTNESS OF BREATH: 0
DIARRHEA: 0
MYALGIAS: 0
DIZZINESS: 0
COUGH: 0
PALPITATIONS: 0
CHILLS: 0
CONSTIPATION: 0
HEARTBURN: 1
WHEEZING: 0
WEIGHT LOSS: 0
TINGLING: 0
FEVER: 0
VOMITING: 0

## 2017-07-25 ASSESSMENT — PAIN SCALES - GENERAL: PAINLEVEL: NO PAIN

## 2017-07-25 NOTE — PROGRESS NOTES
Chemotherapy Verification - SECONDARY RN       Height = 166 cm  Weight = 75.1 kg  BSA = 1.86 m2       Medication: Fluorouracil  Dose: 1920 mg/m2  Calculated Dose: 3571.2 mg pump                            (In mg/m2, AUC, mg/kg)     Medication: Fluorouracil   Dose: 320 mg/m2  Calculated Dose: 595.2 mg push                            (In mg/m2, AUC, mg/kg)    I confirm that this process was performed independently.

## 2017-07-25 NOTE — MR AVS SNAPSHOT
"Mandatrish Gilbert Romeo   2017 8:00 AM   Non-Provider Visit   MRN: [id number]    Department:  [facility]   Dept Phone:  [phone]    Description:  Female : 1940   Provider:  ONC RN 2           Reason for Visit     Labs Only           Allergies as of 2017     Allergen Noted Reactions    Codeine 2011       \"gets drunk\"    Oxaliplatin 10/27/2015   Anaphylaxis    Pcn [Penicillins] 2011   Itching    Sulfa Drugs 2011   Itching    Tape 2013   Rash    PAPER TAPE OK      Vital Signs     Blood Pressure Pulse Temperature Respirations Height Weight    130/80 mmHg 95 36.5 °C (97.7 °F) 16 1.66 m (5' 5.35\") 75.15 kg (165 lb 10.8 oz)    Body Mass Index Oxygen Saturation Smoking Status             27.27 kg/m2 96% Never Smoker          Basic Information     Date Of Birth Sex Race Ethnicity Preferred Language    1940 Female White Non- English      Your appointments     2017 10:00 AM   ONCOLOGY EST PATIENT 30 MIN with ARTURO Arteaga   [facility] (--)    [address], [address]  [facility] [address]   [phone]            2017 11:00 AM   Est Chemo 3 with RN 5   [facility] ([facility])    [id number]  [place] [address]   [phone]            2017  3:00 PM   PREVIOUS PATIENT with BROWN Figueredo   [facility] (--)    [address]  [place] [address]   [phone]            2017  4:00 PM   EST Port Flush / Central Line Care with INFUSION QUICK INJECT   [facility] ([facility])    [address] [facility] [address]  [place] [address]   [phone]            Aug 08, 2017  8:30 AM   Est Chemo 3 with RN 3   [facility] ([facility])    [address] [facility] [address]  [place] [address]   [phone]            Aug 10, 2017  2:00 PM   EST Port Flush / Central Line Care with INFUSION QUICK INJECT   [facility] ([facility])    [address] [facility] [address]  [place] [address]   [phone]            Aug 22, 2017  8:30 AM   Non Provider 1 with ONC RN 2   [facility] (--)    [address], [facility] [address]  [place] [address]   [phone]           You will be receiving a confirmation call a few days before your appointment from our automated call confirmation system.            Aug 22, 2017 10:00 AM   ONCOLOGY EST PATIENT 30 MIN with ARTURO Arteaga   [facility] (--)    [address], [facility] [address]  [place] [address]   [phone]            Aug 22, 2017 11:00 AM   Est Chemo 3 with RN 1   Infusion [facility] ([facility])    [address]  [place] [address]   [phone]            Aug 24, 2017  3:00 PM   EST Port Flush / Central Line Care with INFUSION QUICK INJECT   [facility] ([facility])    [address]  [place] [address]   [phone]            Sep 05, 2017  8:30 AM   Est Chemo 3 with RN 3   [facility] ([facility])    [address]  [place] [address]   [phone]            Sep 07, 2017  1:30 PM   EST Port Flush / Central Line Care with INFUSION QUICK INJECT   [facility] ([facility])    [address]  [place] [address]   [phone]            Sep 19, 2017  8:30 AM   Non Provider 1 with ONC RN 2   [facility] (--)    [address] [facility] [address] [facility] [address]  [place] [address]   [phone]           You will be receiving a confirmation call a few days before your appointment from our automated call confirmation system.            Sep 19, 2017 10:00 AM   ONCOLOGY EST PATIENT 30 MIN with Eric Hardy M.D.   [facility] (--)    [address], [facility] [address]  [place] [address]   [phone]            Sep 19, 2017 11:00 AM   Est Chemo 3 with RN 1   [facility]fusion [facility] ([facility])    [address]  [place] [address]   [phone]            Sep 21, 2017  3:30 PM   EST Port Flush / Central Line Care with RN 5   Infusion [facility] ([facility])    [address]  [place] [address]   [phone]            Nov 13, 2017  9:40 AM   Established Patient with Manan Quiñonez M.D.   [facility] [address]    [address]  [place] [address]   [phone]           You will be receiving a confirmation call a few days before your appointment from our automated call confirmation system.              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   10/15/2014 - Present    Thyroid nodule E04.1   12/19/2014 - Present    Secondary malignant neoplasm of liver (CMS-HCC) C78.7   7/6/2015 - Present    Dehydration E86.0   8/14/2015 - Present    Nausea & vomiting R11.2 Medium  8/15/2015 - Present    Colon cancer (CMS-HCC) C18.9   8/16/2015 - Present    Pulmonary embolism (CMS-HCC) I26.99 High  8/18/2015 - Present    Ileitis K52.9   8/24/2015 - Present    Diarrhea R19.7   8/24/2015 - Present    Hyperlipidemia E78.5   9/17/2015 - Present    Lightheadedness R42   9/17/2015 - Present    Shortness of breath R06.02 High  10/5/2015 - Present    Routine general medical examination at a health care facility Z00.00   3/14/2016 - Present    Renal insufficiency N28.9   3/14/2016 - Present    Hyperglycemia R73.9   3/14/2016 - Present    Decreased GFR R94.4   8/16/2016 - Present    Chemotherapy-induced neutropenia (CMS-HCC) D70.1   9/28/2016 - Present    Uncontrolled pain R52   6/15/2017 - Present      Health Maintenance        Date Due Completion Dates      BONE DENSITY 4/13/2005 ---    IMM PNEUMOCOCCAL 65+ (ADULT) LOW/MEDIUM RISK SERIES (2 of 2 - PCV13) 5/3/2018 (Originally 9/5/2007) 9/5/2006    IMM DTaP/Tdap/Td Vaccine (1 - Tdap) 5/3/2018 (Originally 4/13/1959) ---    IMM INFLUENZA (1) 9/1/2017 10/18/2014, 9/5/2013, 10/12/2012    COLONOSCOPY 8/23/2025 8/23/2015, 9/20/2012            Current Immunizations     Influenza TIV (IM) 9/5/2013  6:12 AM, 10/12/2012  6:09 PM    Influenza Vaccine Quad Inj (Preserved) 10/18/2014    Pneumococcal polysaccharide vaccine (PPSV-23) 9/5/2006    SHINGLES VACCINE 1/14/2015      Below and/or attached are the medications your provider expects you to take. Review all of your home medications and newly ordered medications with your provider and/or pharmacist. Follow medication instructions as directed by your provider and/or pharmacist. Please keep your medication list with you and share with your provider. Update the information when medications are discontinued, doses are changed, or new medications (including over-the-counter products) are added; and carry medication information at all times in the event of emergency situations     Allergies:  CODEINE - (reactions not documented)     OXALIPLATIN - Anaphylaxis     PCN - Itching     SULFA DRUGS - Itching     TAPE - Rash               Medications  Valid as of: July 25, 2017 -  8:32 AM    Generic Name Brand Name Tablet Size Instructions for use    Cholecalciferol (Cap) Vitamin D3 400 UNITS Take 1 Cap by mouth every day.        Cyanocobalamin (Tab) VITAMIN B-12 500 MCG Take 500 mcg by mouth every day.        Furosemide (Tab) LASIX 20 MG Take 1 Tab by mouth every day. PRN EDEMA        Lidocaine-Prilocaine (Cream) EMLA 2.5-2.5 % APPLY A THICK FILM OVER THE PORT ACCESS SITE PRIOR TO ACCESS        Loperamide HCl (Liquid) IMODIUM 1 MG/5ML Take 2 mg by mouth 4 times a day as needed for Diarrhea.        Loratadine (Tab) CLARITIN 10 MG Take 10 mg by mouth every day. Indications: Hayfever         Multiple Vitamins-Minerals   Take  by mouth.        Ondansetron HCl (Tab) ZOFRAN 4 MG         OxyCODONE HCl (Tab) ROXICODONE 5 MG Take 5 mg by mouth every four hours as needed for Severe Pain.        Potassium Chloride (Tab CR) KLOR-CON 10 MEQ Take 1 Tab by mouth every day.        RaNITidine HCl (Tab) ZANTAC 150 MG TAKE 1 TABLET BY MOUTH 2 TIMES A DAY.        Rivaroxaban (Tab) XARELTO 20 MG Take 1 Tab by mouth with dinner.        .                 Medicines prescribed today were sent to:     [facility] - [place] - [address] [facility]    [address] [place] [address]    Phone: [phone] Fax: [phone]    Open 24 Hours?: No    [facility] - [place], [place] - [address] [facility] [address] [facility]    [address] [facility] [place] [address]    Phone: [phone] Fax: [phone]    Open 24 Hours?: No      Medication refill instructions:       If your prescription bottle indicates you have medication refills left, it is not necessary to call your provider’s office. Please contact your pharmacy and they will refill your medication.    If your prescription bottle indicates you do not have any refills left, you may request refills at any time through one of the following ways: The online [employer] system (except Urgent Care), by calling your provider’s office, or by asking your pharmacy to contact your provider’s office with a refill request. Medication refills are processed only during regular business hours and may not be available until the next business day. Your provider may request additional information or to have a follow-up visit with you prior to refilling your medication.   *Please Note: Medication refills are assigned a new Rx number when refilled electronically. Your pharmacy may indicate that no refills were authorized even though a new prescription for the same medication is available at the pharmacy. Please request the medicine by name with the pharmacy before contacting your provider for a  refill.           MyChart Access Code: Activation code not generated  Current Auth0 Status: Active

## 2017-07-25 NOTE — PROGRESS NOTES
Chemotherapy Verification - PRIMARY RN      Height = 166cm  Weight = 75.1kg  BSA = 1.86m2       Medication: leucovorin  Dose: 400mg/m2  Calculated Dose: 744mg                             (In mg/m2, AUC, mg/kg)     Medication: Adrucil CADD pump Dose: 1920mg/m2  Calculated Dose: 3571.2mg                             (In mg/m2, AUC, mg/kg)    Medication: Adrucil Push  Dose: 320mg/m2  Calculated Dose: 595.2mg                             (In mg/m2, AUC, mg/kg)      I confirm this process was performed independently with the BSA and all final chemotherapy dosing calculations congruent.  Any discrepancies of 5% or greater have been addressed with the chemotherapy pharmacist. The resolution of the discrepancy has been documented in the EPIC progress notes.

## 2017-07-25 NOTE — PROGRESS NOTES
Patient arrived for Day 1 Cycle 32 Adrucil CADD pump hook up. Pt reports no significant changes or concerns; noted port previously accessed at Dr. Hardy's office.  Lab results reviewed, noted ANC 1480, below ordered parameters. Per Ella De's note, ok to treat. Port flushed with blood return.  Pre-med given.  Leucovorin infused over 2 hours per pt preference, and Adrucil push given after 1hour of leucovorin infused.  CADD pump connected with second RN verification.  Total pump volume 72.5ml with 71.4ml dose volume.  Pump running; pt discharged home with  in good spirits. Confirmed de-access appt in two days.

## 2017-07-25 NOTE — PROGRESS NOTES
".Pharmacy Chemotherapy Verification    Patient Name: Karlene Walters      Dx: Colon CA      Protocol: 5-FU+Leucovorin      *Dosing Reference*  Leucovorin 400 mg/m² IV over 2 hours on Day 1, followed by  Fluorouracil 400 mg/m²  IVP on Day 1, followed by    - Dose reduced by 20% to 320 mg/m² starting with Cycle 18 ([place] #11) on 7/5/16 due to neutropenia    Fluorouracil 1200 mg/m²  IV continuous infusion daily on Days 1 and 2 (2400 mg/m² IV over 46-48 hours) -    - Infusion Dose reduced by 20% starting with Cycle 18 ([place] #11) on 7/5/16 due to neutropenia   -20% reduction (1920 mg/m2) to be continued starting C28 per C Alsop APRN   14 day cycles for 12 cycles (adjuvant) or until disease progression or unacceptable toxicity  NCCN Guidelines for Colon Cancer. V.2.2015.  Jay GROVER, et al. Eur J Cancer.1999;35(9):1343-7.      Allergies:  Codeine; Oxaliplatin; Pcn; Sulfa drugs; and Tape     /76 mmHg  Pulse 93  Temp(Src) 36.4 °C (97.5 °F)  Resp 18  Ht 1.66 m (5' 5.35\")  Wt 75.1 kg (165 lb 9.1 oz)  BMI 27.25 kg/m2  SpO2 98% Body surface area is 1.86 meters squared.      Labs 7/25/17:  ANC~ 1480 (Ok to treat per C. Alsop APRN)    Plt = 175k   Hgb = 13     SCr = 0.97 mg/dL CrCl ~ 58 mL/min   LFT's = WNL except   TBili = 0.6   K+ = 3.6    Drug Order   (Drug name, dose, route, IV Fluid & volume, frequency, number of doses) Cycle: 40 ([place] C32)  Previous treatment:  7/11/17     Medication = Leucovorin (Wellcovorin)  Base Dose = 400 mg/m²  Calc Dose: Base Dose x 1.86 m² = 744 mg  Final Dose = 744 mg  Route = IV  Fluid & Volume = D5W 250 mL  Admin Duration = Over 1 hour         <5% difference, OK to treat with final dose   Medication = Fluorouracil (5-FU)   Base Dose = 320 mg/m²  Calc Dose: Base Dose x 1.86 m² = 595.2 mg  Final Dose = 595 mg  Route = IVP  Fluid & Volume = 11.9 mL in syringe  Conc = 50 mg/mL  Admin Duration = Over 2-5 minutes         <5% difference, OK to treat with final dose   Medication = " Fluorouracil (5-FU)   Base Dose = 1920 mg/m²/46 hours  Calc Dose:Base Dose x 1.86 m² = 3571.2 mg  Final Dose = 3570 mg  Route = CIVI over 46 hours  Fluid & Volume = CADD pump 71.4 mL (plus 3 ml overfill = 74.4 mL)   Conc = 50 mg/mL  Admin Duration = Over 46 hours  Rate = 1.6 mL/hour         <5% difference, OK to treat with final dose     By my signature below, I confirm this process was performed independently with the BSA and all final chemotherapy dosing calculations congruent. I have reviewed the above chemotherapy order and that my calculation of the final dose and BSA (when applicable) corroborate those calculations of the  pharmacist.     Sofiya Pedro, PharmD

## 2017-07-25 NOTE — PROGRESS NOTES
"Pharmacy Chemotherapy Verification    Patient Name: Karlene Walters      Dx: Colon CA     Cycle: 40 ([place] cycle 32)  Previous treatment: 7/11/17    Protocol: 5 FU/Leucovorin    Leucovorin 400 mg/m² IV over 2 hours on Day 1, followed by  Fluorouracil 400 mg/m²  IVP on Day 1, followed by   - Dose reduced by 20% to 320 mg/m² starting with Cycle 18 ([place] #11) on 7/5/16 due to neutropenia    Fluorouracil 1200 mg/m²  IV continuous infusion daily on Days 1 and 2 (2400 mg/m² IV over 46-48 hours) -   - Infusion Dose reduced by 20% starting with Cycle 18 ([place] #11) on 7/5/16 due to neutropenia   -20% reduction (1920 mg/m2) to be continued starting C28 per C Alsop APRN   14 day cycles for 12 cycles (adjuvant) or until disease progression or unacceptable toxicity  NCCN Guidelines for Colon Cancer. V.2.2015.  Jay T, et al. Eur J Cancer.1999;35(9):1343-7.          Allergies: Codeine; Oxaliplatin; Pcn; Sulfa drugs; and Tape  /76 mmHg  Pulse 93  Temp(Src) 36.4 °C (97.5 °F)  Resp 18  Ht 1.66 m (5' 5.35\")  Wt 75.1 kg (165 lb 9.1 oz)  BMI 27.25 kg/m2  SpO2 98% Body surface area is 1.86 meters squared.     ANC~ 1480 (OK to treat per Alsop APRN) Plt = 175k   Hgb = 13.0    7/25/17:  SCr = 0.97 mg/dL CrCl ~58 mL/min   LFT's = WNL, except   TBili = 0.6  Leucovorin 400 mg/m² x 1.86m² = 744 mg   <5% difference, OK to treat with final dose = 744 mg IV    Fluorouracil (5-FU) 320 mg/m² x 1.86 m² = 595 mg   <5% difference, OK to treat with final dose = 595 mg IVP    Fluorouracil (5-FU) 1920 mg/m2 x 1.86 m2 = 3571 mg   <5% difference, OK to treat with final dose = 3570 mg CIVI over 46 hours  Via home infusion pump at 1.6 ml/hr     Norris Mitchell PharmD    "

## 2017-07-25 NOTE — MR AVS SNAPSHOT
"        Karlene Gilbert Romeo   2017 10:00 AM   Office Visit   MRN: [id number]    Department:  [facility]   Dept Phone:  [phone]    Description:  Female : 1940   Provider:  Ella De, A.P.N.           Reason for Visit     Follow-Up Prechemo      Allergies as of 2017     Allergen Noted Reactions    Codeine 2011       \"gets drunk\"    Oxaliplatin 10/27/2015   Anaphylaxis    Pcn [Penicillins] 2011   Itching    Sulfa Drugs 2011   Itching    Tape 2013   Rash    PAPER TAPE OK      You were diagnosed with     Rectal cancer (CMS-HCC)   [218519]       Right upper quadrant abdominal pain   [011276]         Vital Signs     Blood Pressure Pulse Temperature Respirations Height Weight    130/80 mmHg 95 36.5 °C (97.7 °F) 16 1.66 m (5' 5.35\") 75.15 kg (165 lb 10.8 oz)    Body Mass Index Oxygen Saturation Smoking Status             27.27 kg/m2 96% Never Smoker          Basic Information     Date Of Birth Sex Race Ethnicity Preferred Language    1940 Female White Non- English      Your appointments     2017 11:00 AM   Est Chemo 3 with RN 5   [facility] ([facility])    [address]  [place] [address]   [phone]            2017  3:00 PM   PREVIOUS PATIENT with BROWN Figueredo   [facility] (--)    [address] [place] [address] [address]  [place] [address]   [phone]            2017  4:00 PM   EST Port Flush / Central Line Care with INFUSION QUICK INJECT   [facility] ([facility])    [address] [facility] [address]  [place] [address]   [phone]            Aug 08, 2017  8:30 AM   Est Chemo 3 with RN 3   [facility] ([facility])    [address]  [place] [address]   [phone]            Aug 10, 2017  2:00 PM   EST Port Flush / Central Line Care with INFUSION QUICK INJECT   [facility] ([facility])    [address]  [place] [address]   [phone]           "    Aug 22, 2017  8:30 AM   Non Provider 1 with ONC RN 2   [facility] (--)    [address], [address]  [facility] [address] [phone]           You will be receiving a confirmation call a few days before your appointment from our automated call confirmation system.            Aug 22, 2017 10:00 AM   ONCOLOGY EST PATIENT 30 MIN with Eric Hardy M.D.   [facility] (--)    [address], [address]  [facility] [address]   [phone]            Aug 22, 2017 11:00 AM   Est Chemo 3 with RN 1   [facility] ([facility])    [address] [facility] [address]  [place] [address]   [phone]            Aug 24, 2017  3:00 PM   EST Port Flush / Central Line Care with INFUSION QUICK INJECT   [facility] ([facility])    [address] [facility] [address]  [place] [address]   [phone]            Sep 05, 2017  8:30 AM   Est Chemo 3 with RN 3   [facility] ([facility])    [address] [facility] [address]  [place] [address]   [phone]            Sep 07, 2017  1:30 PM   EST Port Flush / Central Line Care with INFUSION QUICK INJECT   [facility] ([facility])    [address]  [place] [address]   [phone]            Sep 19, 2017  8:30 AM   Non Provider 1 with ONC RN 2   [facility] (--)    [address], [address]  [facility] [address]   [phone]           You will be receiving a confirmation call a few days before your appointment from our automated call confirmation system.            Sep 19, 2017 10:00 AM   ONCOLOGY EST PATIENT 30 MIN with Eric Hardy M.D.   [facility] (--)    [address], [address]  [place] [address]   [phone]            Sep 19, 2017 11:00 AM   Est Chemo 3 with RN 1   [facility] ([facility])    [address] [facility] [address]  [place] [address]   [phone]            Sep 21, 2017  3:30 PM   EST Port Flush / Central Line Care with RN 5   [facility] ([facility])    [address]  [place] [address]   [phone]             Nov 13, 2017  9:40 AM   Established Patient with Manan Quiñonez M.D.   [facility] [address]    [address]  [place] [address]   [phone]           You will be receiving a confirmation call a few days before your appointment from our automated call confirmation system.              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   10/15/2014 - Present    Thyroid nodule E04.1   12/19/2014 - Present    Secondary malignant neoplasm of liver (CMS-HCC) C78.7   7/6/2015 - Present    Dehydration E86.0   8/14/2015 - Present    Nausea & vomiting R11.2 Medium  8/15/2015 - Present    Colon cancer (CMS-HCC) C18.9   8/16/2015 - Present    Pulmonary embolism (CMS-HCC) I26.99 High  8/18/2015 - Present    Ileitis K52.9   8/24/2015 - Present    Diarrhea R19.7   8/24/2015 - Present    Hyperlipidemia E78.5   9/17/2015 - Present    Lightheadedness R42   9/17/2015 - Present    Shortness of breath R06.02 High  10/5/2015 - Present    Routine general medical examination at a health care facility Z00.00   3/14/2016 - Present    Renal insufficiency N28.9   3/14/2016 - Present    Hyperglycemia R73.9   3/14/2016 - Present    Decreased GFR R94.4   8/16/2016 - Present    Chemotherapy-induced neutropenia (CMS-HCC) D70.1   9/28/2016 - Present    Uncontrolled pain R52   6/15/2017 - Present      Health Maintenance        Date Due Completion Dates    BONE DENSITY 4/13/2005 ---    IMM PNEUMOCOCCAL 65+  (ADULT) LOW/MEDIUM RISK SERIES (2 of 2 - PCV13) 5/3/2018 (Originally 9/5/2007) 9/5/2006    IMM DTaP/Tdap/Td Vaccine (1 - Tdap) 5/3/2018 (Originally 4/13/1959) ---    IMM INFLUENZA (1) 9/1/2017 10/18/2014, 9/5/2013, 10/12/2012    COLONOSCOPY 8/23/2025 8/23/2015, 9/20/2012            Current Immunizations     Influenza TIV (IM) 9/5/2013  6:12 AM, 10/12/2012  6:09 PM    Influenza Vaccine Quad Inj (Preserved) 10/18/2014    Pneumococcal polysaccharide vaccine (PPSV-23) 9/5/2006    SHINGLES VACCINE 1/14/2015      Below and/or attached are the medications your provider expects you to take. Review all of your home medications and newly ordered medications with your provider and/or pharmacist. Follow medication instructions as directed by your provider and/or pharmacist. Please keep your medication list with you and share with your provider. Update the information when medications are discontinued, doses are changed, or new medications (including over-the-counter products) are added; and carry medication information at all times in the event of emergency situations     Allergies:  CODEINE - (reactions not documented)     OXALIPLATIN - Anaphylaxis     PCN - Itching     SULFA DRUGS - Itching     TAPE - Rash               Medications  Valid as of: July 25, 2017 - 10:30 AM    Generic Name Brand Name Tablet Size Instructions for use    Cholecalciferol (Cap) Vitamin D3 400 UNITS Take 1 Cap by mouth every day.        Cyanocobalamin (Tab) VITAMIN B-12 500 MCG Take 500 mcg by mouth every day.        Furosemide (Tab) LASIX 20 MG Take 1 Tab by mouth every day. PRN EDEMA        Lidocaine-Prilocaine (Cream) EMLA 2.5-2.5 % APPLY A THICK FILM OVER THE PORT ACCESS SITE PRIOR TO ACCESS        Loperamide HCl (Liquid) IMODIUM 1 MG/5ML Take 2 mg by mouth 4 times a day as needed for Diarrhea.        Loratadine (Tab) CLARITIN 10 MG Take 10 mg by mouth every day. Indications: Hayfever        Multiple Vitamins-Minerals   Take  by mouth.         Ondansetron HCl (Tab) ZOFRAN 4 MG         OxyCODONE HCl (Tab) ROXICODONE 5 MG Take 1 Tab by mouth every four hours as needed for Severe Pain.        Potassium Chloride (Tab CR) KLOR-CON 10 MEQ Take 1 Tab by mouth every day.        RaNITidine HCl (Tab) ZANTAC 150 MG TAKE 1 TABLET BY MOUTH 2 TIMES A DAY.        Rivaroxaban (Tab) XARELTO 20 MG Take 1 Tab by mouth with dinner.        .                 Medicines prescribed today were sent to:     [facility] - [place], [place] - [address]    [address] [facility] [address]    Phone: [phone] Fax: [phone]    Open 24 Hours?: No    [id number] - [place], [place] - [address]    [address] [facility] [address]    Phone: [phone] Fax: [phone]    Open 24 Hours?: No      Medication refill instructions:       If your prescription bottle indicates you have medication refills left, it is not necessary to call your provider’s office. Please contact your pharmacy and they will refill your medication.    If your prescription bottle indicates you do not have any refills left, you may request refills at any time through one of the following ways: The online [employer] system (except Urgent Care), by calling your provider’s office, or by asking your pharmacy to contact your provider’s office with a refill request. Medication refills are processed only during regular business hours and may not be available until the next business day. Your provider may request additional information or to have a follow-up visit with you prior to refilling your medication.   *Please Note: Medication refills are assigned a new Rx number when refilled electronically. Your pharmacy may indicate that no refills were authorized even though a new prescription for the same medication is available at the pharmacy. Please request the medicine by name with the pharmacy before contacting your provider for a refill.           [employer] Access Code: Activation  code not generated  Current MyChart Status: Active

## 2017-07-25 NOTE — PROGRESS NOTES
"Subjective:      Karlene Walters is a 77 y.o. 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         HPI    Patient seen today in follow up for rectal cancer to liver. He is accompanied by her  for the visit today. She is scheduled to received 5FU today, cycle #32.     Patient is doing very well and tolerating treatment well. She denies any fevers or chills. Her appetite was fairly poor right after Y 90 but she is doing much better now. She does have some mild residual fatigue but it is getting better as well. Patient denies any coughing wheezing or shortness of breath. She denies any chest pain, heart palpitations. She does have some mild swelling in her legs at times. She takes Lasix as needed with positive results. She is also on Xarelto for a previous blood clot. She stated that if she elevates her legs swelling will go down as well. It is very intermittent. She does have some residual nausea but no vomiting. She does takes Zofran with positive results. The nausea has been present since her Y 90 procedures well. She is having normal formed stool in all movements via her ostomy. Her normal routine. She is voiding without difficulty. She does have some residual upper right and epigastric abdominal pain since her Y 90. She takes and oxycodone 5 mg only as needed but it has been very beneficial for patient. She denies any dizziness, peripheral neuropathy or headaches.    She is interested in a vacation in mid-September. Possibly will ask for 1 week off to go.     Allergies   Allergen Reactions   • Codeine      \"gets drunk\"   • Oxaliplatin Anaphylaxis   • Pcn [Penicillins] Itching   • Sulfa Drugs Itching   • Tape Rash     PAPER TAPE OK     Current Outpatient Prescriptions on File Prior to Visit   Medication Sig Dispense Refill   • ranitidine (ZANTAC) 150 MG Tab TAKE 1 TABLET BY MOUTH 2 TIMES A DAY. 60 Tab 3   • furosemide (LASIX) 20 MG Tab Take 1 Tab by mouth every day. PRN EDEMA 30 Tab " 3   • potassium chloride ER (KLOR-CON) 10 MEQ tablet Take 1 Tab by mouth every day. 30 Tab 3   • rivaroxaban (XARELTO) 20 MG Tab tablet Take 1 Tab by mouth with dinner. 30 Tab 6   • lidocaine-prilocaine (EMLA) 2.5-2.5 % Cream APPLY A THICK FILM OVER THE PORT ACCESS SITE PRIOR TO ACCESS 30 g 2   • loperamide (IMODIUM) 1 MG/5ML Liquid Take 2 mg by mouth 4 times a day as needed for Diarrhea.     • ondansetron (ZOFRAN) 4 MG Tab tablet      • oxycodone immediate-release (ROXICODONE) 5 MG Tab Take 5 mg by mouth every four hours as needed for Severe Pain.     • cyanocobalamin (VITAMIN B-12) 500 MCG Tab Take 500 mcg by mouth every day.     • Multiple Vitamins-Minerals (CENTRUM SILVER PO) Take  by mouth.     • Cholecalciferol (VITAMIN D3) 400 UNITS CAPS Take 1 Cap by mouth every day.     • loratadine (CLARITIN) 10 MG TABS Take 10 mg by mouth every day. Indications: Hayfever       Current Facility-Administered Medications on File Prior to Visit   Medication Dose Route Frequency Provider Last Rate Last Dose   • heparin pf injection 500 Units  500 Units Intracatheter PRN Ella De, A.P.N.   500 Units at 07/25/17 0850       Review of Systems   Constitutional: Positive for malaise/fatigue. Negative for fever, chills and weight loss.   Respiratory: Negative for cough, shortness of breath and wheezing.    Cardiovascular: Positive for leg swelling (very mild at times. She takes Lasix with positive results. She is on Xarelto. ). Negative for chest pain and palpitations.   Gastrointestinal: Positive for heartburn (still present at times and taking Zantac that she received from Interventional radiologist ), nausea (some since Y90 relieves with Zofran) and abdominal pain (residual. ). Negative for vomiting, diarrhea and constipation.   Genitourinary: Negative for dysuria.   Musculoskeletal: Negative for myalgias.   Skin: Negative for itching and rash.   Neurological: Negative for dizziness, tingling and headaches.           "Objective:     /80 mmHg  Pulse 95  Temp(Src) 36.5 °C (97.7 °F)  Resp 16  Ht 1.66 m (5' 5.35\")  Wt 75.15 kg (165 lb 10.8 oz)  BMI 27.27 kg/m2  SpO2 96%     Physical Exam   Constitutional: She is oriented to person, place, and time. She appears well-developed and well-nourished. No distress.   HENT:   Head: Normocephalic and atraumatic.   Mouth/Throat: Oropharynx is clear and moist. No oropharyngeal exudate.   Eyes: Conjunctivae and EOM are normal. Pupils are equal, round, and reactive to light. Right eye exhibits no discharge. Left eye exhibits no discharge. No scleral icterus.   Cardiovascular: Normal rate, regular rhythm, normal heart sounds and intact distal pulses.  Exam reveals no gallop and no friction rub.    No murmur heard.  Pulmonary/Chest: Effort normal and breath sounds normal. No respiratory distress. She has no wheezes.   Abdominal: Soft. Bowel sounds are normal. She exhibits no distension. There is no tenderness.   Musculoskeletal: Normal range of motion. She exhibits no edema or tenderness.   Neurological: She is alert and oriented to person, place, and time.   Skin: Skin is warm and dry. No rash noted. She is not diaphoretic. No erythema. No pallor.   Psychiatric: She has a normal mood and affect. Her behavior is normal.   Vitals reviewed.         Hospital Outpatient Visit on 07/25/2017   Component Date Value Ref Range Status   • WBC 07/25/2017 2.6* 4.8 - 10.8 K/uL Final   • RBC 07/25/2017 4.18* 4.20 - 5.40 M/uL Final   • Hemoglobin 07/25/2017 13.0  12.0 - 16.0 g/dL Final   • Hematocrit 07/25/2017 39.5  37.0 - 47.0 % Final   • MCV 07/25/2017 94.5  81.4 - 97.8 fL Final   • MCH 07/25/2017 31.1  27.0 - 33.0 pg Final   • MCHC 07/25/2017 32.9* 33.6 - 35.0 g/dL Final   • RDW 07/25/2017 50.6* 35.9 - 50.0 fL Final   • Platelet Count 07/25/2017 175  164 - 446 K/uL Final   • MPV 07/25/2017 9.7  9.0 - 12.9 fL Final   • Neutrophils-Polys 07/25/2017 57.00  44.00 - 72.00 % Final   • Lymphocytes " 07/25/2017 16.50* 22.00 - 41.00 % Final   • Monocytes 07/25/2017 19.20* 0.00 - 13.40 % Final   • Eosinophils 07/25/2017 4.20  0.00 - 6.90 % Final   • Basophils 07/25/2017 2.30* 0.00 - 1.80 % Final   • Immature Granulocytes 07/25/2017 0.80  0.00 - 0.90 % Final   • Nucleated RBC 07/25/2017 0.00   Final   • Neutrophils (Absolute) 07/25/2017 1.48* 2.00 - 7.15 K/uL Final    Includes immature neutrophils, if present.   • Lymphs (Absolute) 07/25/2017 0.43* 1.00 - 4.80 K/uL Final   • Monos (Absolute) 07/25/2017 0.50  0.00 - 0.85 K/uL Final   • Eos (Absolute) 07/25/2017 0.11  0.00 - 0.51 K/uL Final   • Baso (Absolute) 07/25/2017 0.06  0.00 - 0.12 K/uL Final   • Immature Granulocytes (abs) 07/25/2017 0.02  0.00 - 0.11 K/uL Final   • NRBC (Absolute) 07/25/2017 0.00   Final   • Sodium 07/25/2017 140  135 - 145 mmol/L Final   • Potassium 07/25/2017 3.6  3.6 - 5.5 mmol/L Final   • Chloride 07/25/2017 108  96 - 112 mmol/L Final   • Co2 07/25/2017 23  20 - 33 mmol/L Final   • Anion Gap 07/25/2017 9.0  0.0 - 11.9 Final   • Glucose 07/25/2017 111* 65 - 99 mg/dL Final   • Bun 07/25/2017 17  8 - 22 mg/dL Final   • Creatinine 07/25/2017 0.97  0.50 - 1.40 mg/dL Final   • Calcium 07/25/2017 9.6  8.5 - 10.5 mg/dL Final   • AST(SGOT) 07/25/2017 34  12 - 45 U/L Final   • ALT(SGPT) 07/25/2017 31  2 - 50 U/L Final   • Alkaline Phosphatase 07/25/2017 165* 30 - 99 U/L Final   • Total Bilirubin 07/25/2017 0.6  0.1 - 1.5 mg/dL Final   • Albumin 07/25/2017 3.7  3.2 - 4.9 g/dL Final   • Total Protein 07/25/2017 6.5  6.0 - 8.2 g/dL Final   • Globulin 07/25/2017 2.8  1.9 - 3.5 g/dL Final   • A-G Ratio 07/25/2017 1.3   Final   • GFR If  07/25/2017 >60  >60 mL/min/1.73 m 2 Final   • GFR If Non  07/25/2017 56* >60 mL/min/1.73 m 2 Final        Assessment/Plan:     1. Rectal cancer (CMS-HCC)  oxycodone immediate-release (ROXICODONE) 5 MG Tab   2. Right upper quadrant abdominal pain  oxycodone immediate-release (ROXICODONE) 5  MG Tab     Plan  1. Patient is scheduled to receive 5-FU today. I did review her CBC, CMP and a to proceed with treatment as planned. We are currently waiting on a CEA results. Patient's ANC is 1.48 today. Okay to proceed with those results.    2. Patient is status post Y 90 with some residual fatigue and abdominal pain. She stated the individual radiologist did provide her with a prescription for oxycodone 5 mg and has taken it sparingly but with positive results. I have refilled her prescription today for persistent abdominal pain.    3. Patient is to continue with 5-FU every 2 weeks as scheduled, and follow-up in the office every 4 weeks or sooner if needed.

## 2017-07-25 NOTE — NON-PROVIDER
"Karlene Walters is a 77 y.o. female here for a non-provider visit for: lab draw on 7/25/2017 at 8:00 AM    Procedure Performed: port access w/ blood draw    Anatomical site: left port    Equipment used: Port Access Kit- 20gx1\"    Labs drawn: CBC w/diff, CEA and CMP    Ordering Provider: ANDREW Krishnan    [place] By: Jamie Boudreaux R.N.     Port accessed with sterile technique; brisk blood return verified. Labs drawn per written chemotherapy treatment plan. Port flushed with NS and heparin per protocol.  Biopatch applied around hernandez needle. Port remains accessed and heplocked for chemotherapy appointment today.          "

## 2017-07-26 ENCOUNTER — OFFICE VISIT (OUTPATIENT)
Dept: CARDIOLOGY | Facility: MEDICAL CENTER | Age: 77
End: 2017-07-26
Payer: MEDICARE

## 2017-07-26 VITALS
DIASTOLIC BLOOD PRESSURE: 76 MMHG | HEIGHT: 65 IN | BODY MASS INDEX: 27.32 KG/M2 | OXYGEN SATURATION: 97 % | HEART RATE: 88 BPM | SYSTOLIC BLOOD PRESSURE: 110 MMHG | WEIGHT: 164 LBS

## 2017-07-26 DIAGNOSIS — Z09 CHEMOTHERAPY FOLLOW-UP EXAMINATION: ICD-10-CM

## 2017-07-26 DIAGNOSIS — I10 ESSENTIAL HYPERTENSION: ICD-10-CM

## 2017-07-26 DIAGNOSIS — E78.5 HYPERLIPIDEMIA, UNSPECIFIED HYPERLIPIDEMIA TYPE: ICD-10-CM

## 2017-07-26 DIAGNOSIS — I26.99 OTHER PULMONARY EMBOLISM WITHOUT ACUTE COR PULMONALE, UNSPECIFIED CHRONICITY (HCC): ICD-10-CM

## 2017-07-26 DIAGNOSIS — R94.31 ABNORMAL EKG: ICD-10-CM

## 2017-07-26 PROCEDURE — 99214 OFFICE O/P EST MOD 30 MIN: CPT | Performed by: NURSE PRACTITIONER

## 2017-07-26 ASSESSMENT — ENCOUNTER SYMPTOMS
ORTHOPNEA: 0
FALLS: 0
FOCAL WEAKNESS: 0
WEAKNESS: 0
DIZZINESS: 0
BLURRED VISION: 0
HEADACHES: 0
LOSS OF CONSCIOUSNESS: 0
PALPITATIONS: 0
WHEEZING: 0
DOUBLE VISION: 0
SHORTNESS OF BREATH: 0
COUGH: 0

## 2017-07-26 NOTE — MR AVS SNAPSHOT
"        Karlene Gilbert Romeo   2017 3:00 PM   Office Visit   MRN: [id number]    Department:  [facility]   Dept Phone:  [phone]    Description:  Female : 1940   Provider:  BROWN Figueredo           Reason for Visit     Hospital Follow-up PP      Allergies as of 2017     Allergen Noted Reactions    Codeine 2011       \"gets drunk\"    Oxaliplatin 10/27/2015   Anaphylaxis    Pcn [Penicillins] 2011   Itching    Sulfa Drugs 2011   Itching    Tape 2013   Rash    PAPER TAPE OK      Vital Signs     Blood Pressure Pulse Height Weight Body Mass Index Oxygen Saturation    110/76 mmHg 88 1.66 m (5' 5.35\") 74.39 kg (164 lb) 27.00 kg/m2 97%    Smoking Status                   Never Smoker            Basic Information     Date Of Birth Sex Race Ethnicity Preferred Language    1940 Female White Non- English      Your appointments     2017  4:00 PM   EST [place] / Central Line Care with INFUSION QUICK INJECT   Infusion Services ([facility])    [address]  [facility] [address]   [phone]            Aug 08, 2017  8:30 AM   Est Chemo 3 with RN 3   Infusion Services ([facility])    [address] [address]   [phone]            Aug 10, 2017  2:00 PM   EST [place] / Central Line Care with INFUSION QUICK INJECT   Infusion Services ([facility])    [address] [address]   [phone]            Aug 22, 2017  8:30 AM   Non Provider 1 with ONC RN 2   [facility] (--)    [address] [facility] [address]  [facility] [address] [phone]           You will be receiving a confirmation call a few days before your appointment from our automated call confirmation system.            Aug 22, 2017 10:00 AM   ONCOLOGY EST PATIENT 30 MIN with Eric Hardy M.D.   [facility] (--)    [address], [facility] [address]  [facility] [address] [phone]            Aug 22, 2017 11:00 AM   Est Chemo 3 with RN 1   "   Infusion Services ([facility])    [address] [facility] [address]  [place] [address]   [phone]            Aug 24, 2017  3:00 PM   EST [place] / Central Line Care with INFUSION QUICK INJECT   Infusion Services ([facility])    [address] [facility] [address]  [place] [address]   [phone]            Sep 05, 2017  8:30 AM   Est Chemo 3 with RN 3   Infusion Services ([facility])    [address] [facility] [address]  [place] [address]   [phone]            Sep 07, 2017  1:30 PM   EST [place] / Central Line Care with INFUSION QUICK INJECT   Infusion Services ([facility])    [address] [facility] [address]  [place] [address]   [phone]            Sep 19, 2017  8:30 AM   Non Provider 1 with ONC RN 2   [facility] (--)    [address], [address]  [facility] [address]   [phone]           You will be receiving a confirmation call a few days before your appointment from our automated call confirmation system.            Sep 19, 2017 10:00 AM   ONCOLOGY EST PATIENT 30 MIN with Eric Hardy M.D.   [facility] (--)    [address], [address]  [facility] [address]   [phone]            Sep 19, 2017 11:00 AM   Est Chemo 3 with RN 1   Infusion Services ([facility])    [address] [facility] [address]  [place] [address]   [phone]            Sep 21, 2017  3:30 PM   EST [place] / Central Line Care with RN 5   Infusion Services ([facility])    [address] [facility] [address]  [place] [address]   [phone]            Nov 13, 2017  9:40 AM   Established Patient with Manan Quiñonez M.D.   [facility] [address]    [address]  [facility] [address]   [phone]           You will be receiving a confirmation call a few days before your appointment from our automated call confirmation system.              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   10/15/2014 - Present    Thyroid nodule E04.1   12/19/2014 - Present    Secondary malignant neoplasm of liver (CMS-HCC) C78.7   7/6/2015 - Present    Dehydration E86.0   8/14/2015 - Present    Nausea & vomiting R11.2 Medium  8/15/2015 - Present    Colon cancer (CMS-HCC) C18.9   8/16/2015 - Present    Pulmonary embolism (CMS-HCC) I26.99 High  8/18/2015 - Present    Ileitis K52.9   8/24/2015 - Present    Diarrhea R19.7   8/24/2015 - Present    Hyperlipidemia E78.5   9/17/2015 - Present    Lightheadedness R42   9/17/2015 - Present    Shortness of breath R06.02 High  10/5/2015 - Present    Routine general medical examination at a health care facility Z00.00   3/14/2016 - Present    Renal insufficiency N28.9   3/14/2016 - Present    Hyperglycemia R73.9   3/14/2016 - Present    Decreased GFR R94.4   8/16/2016 - Present    Chemotherapy-induced neutropenia (CMS-HCC) D70.1   9/28/2016 - Present    Uncontrolled pain R52   6/15/2017 - Present      Health Maintenance        Date Due Completion Dates    BONE DENSITY 4/13/2005 ---    IMM PNEUMOCOCCAL 65+ (ADULT) LOW/MEDIUM RISK SERIES (2 of 2 - PCV13) 5/3/2018 (Originally 9/5/2007) 9/5/2006    IMM DTaP/Tdap/Td Vaccine (1 - Tdap) 5/3/2018 (Originally 4/13/1959) ---    IMM INFLUENZA (1) 9/1/2017 10/18/2014, 9/5/2013, 10/12/2012    COLONOSCOPY 8/23/2025 8/23/2015, 9/20/2012            Current Immunizations     Influenza TIV (IM) 9/5/2013  6:12 AM, 10/12/2012  6:09 PM    Influenza Vaccine Quad Inj (Preserved) 10/18/2014    Pneumococcal polysaccharide vaccine (PPSV-23) 9/5/2006    SHINGLES VACCINE 1/14/2015       Below and/or attached are the medications your provider expects you to take. Review all of your home medications and newly ordered medications with your provider and/or pharmacist. Follow medication instructions as directed by your provider and/or pharmacist. Please keep your medication list with you and share with your provider. Update the information when medications are discontinued, doses are changed, or new medications (including over-the-counter products) are added; and carry medication information at all times in the event of emergency situations     Allergies:  CODEINE - (reactions not documented)     OXALIPLATIN - Anaphylaxis     PCN - Itching     SULFA DRUGS - Itching     TAPE - Rash               Medications  Valid as of: July 26, 2017 -  3:53 PM    Generic Name Brand Name Tablet Size Instructions for use    Cholecalciferol (Cap) Vitamin D3 400 UNITS Take 1 Cap by mouth every day.        Cyanocobalamin (Tab) VITAMIN B-12 500 MCG Take 500 mcg by mouth every day.        Furosemide (Tab) LASIX 20 MG Take 1 Tab by mouth every day. PRN EDEMA        Lidocaine-Prilocaine (Cream) EMLA 2.5-2.5 % APPLY A THICK FILM OVER THE PORT ACCESS SITE PRIOR TO ACCESS        Loperamide HCl (Liquid) IMODIUM 1 MG/5ML Take 2 mg by mouth 4 times a day as needed for Diarrhea.        Loratadine (Tab) CLARITIN 10 MG Take 10 mg by mouth every day. Indications: Hayfever        Multiple Vitamins-Minerals   Take  by mouth.        Ondansetron HCl (Tab) ZOFRAN 4 MG         OxyCODONE HCl (Tab) ROXICODONE 5 MG Take 1 Tab by mouth every four hours as needed for Severe Pain.        Potassium Chloride (Tab CR) KLOR-CON 10 MEQ Take 1 Tab by mouth every day.        RaNITidine HCl (Tab) ZANTAC 150 MG TAKE 1 TABLET BY MOUTH 2 TIMES A DAY.        Rivaroxaban (Tab) XARELTO 20 MG Take 1 Tab by mouth with dinner.        .                 Medicines prescribed today were sent to:     [facility] - [place], [place] - [address] [place] [address]     [address] [facility] [place] [address]    Phone: [phone] Fax: [phone]    Open 24 Hours?: No    [id number] - [place], [place] - [address] [facility]    [address] [facility] [place] [address]    Phone: [phone] Fax: [phone]    Open 24 Hours?: No      Medication refill instructions:       If your prescription bottle indicates you have medication refills left, it is not necessary to call your provider’s office. Please contact your pharmacy and they will refill your medication.    If your prescription bottle indicates you do not have any refills left, you may request refills at any time through one of the following ways: The online [facility] system (except Urgent Care), by calling your provider’s office, or by asking your pharmacy to contact your provider’s office with a refill request. Medication refills are processed only during regular business hours and may not be available until the next business day. Your provider may request additional information or to have a follow-up visit with you prior to refilling your medication.   *Please Note: Medication refills are assigned a new Rx number when refilled electronically. Your pharmacy may indicate that no refills were authorized even though a new prescription for the same medication is available at the pharmacy. Please request the medicine by name with the pharmacy before contacting your provider for a refill.           [facility] Access Code: Activation code not generated  Current [facility] Status: Active

## 2017-07-26 NOTE — PROGRESS NOTES
Subjective:   Karlene Walters is a 77 y.o. female who presents today for 6 months follow up.    She is patient of Dr. 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    She has been doing well. She is currently on chemotherapy thorough her power port on the left side.     She gets sharp chest pain on the left upper chest and armpit the night after chemo-infusion, same side of the power port. She takes oxycodone, then the pain is relieved. The sharp chest pain is not exacerbated with exertion and no associated symptoms are present.     She has had no episodes of  palpitations, dizziness/lightheadedness, shortness of breath, orthopnea, or peripheral edema.     Past Medical History   Diagnosis Date   • Blood transfusion 1957   • Hypercholesteremia    • Dental disorder      dentures UPPERS AND LOWERS   • Obstruction      ileostomy   • Other specified symptom associated with female genital organs      HYSTERECTOMY 1993   • CATARACT      removed b/l   • Seasonal allergies    • Ileostomy in place (CMS-HCC)    • Fall    • Indigestion    • Arrhythmia    • Lightheadedness 9/17/2015   • Routine general medical examination at a health care facility 3/14/2016     3/14/16   • Renal insufficiency 3/14/2016   • Chemotherapy-induced neutropenia (CMS-HCC) 9/28/2016   • 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)    • Cancer (CMS-HCC) 09/2012     rectal cancer,  Liver CA-2015   • Hypertension      no meds currently    • Pneumonia 05/2017     tx'd with antibx   • Hemorrhagic disorder (CMS-HCC)      on Xarelto    • Blood clotting disorder (CMS-HCC) 08/2015     bilat PE      Past Surgical History   Procedure Laterality Date   • Tonsillectomy     • Abdominal hysterectomy total  1983   • Eye surgery       cataracts   • Cyst excision  1972     ovarian   • Other  2009     left eye torn retina repair   • Colonoscopy  flex w/endo us  9/20/2012     Performed by Harshil Bolton M.D. at SURGERY [facility]   • Colectomy  9/26/2012     Performed by Kolton Montgomery M.D. at SURGERY [facility]   • Laparoscopy  10/8/2012     Performed by Kolton Montgomery M.D. at [facility]   • Ileo loop diversion  10/8/2012     Performed by Kolton Montgomery M.D. at SURGERY [facility]   • Sigmoidoscopy  2/27/2013     Performed by Kolton Montgomery M.D. at [facility]   • Fistula in ano repair  2/27/2013     Performed by Kolton Montgomery M.D. at SURGERY [facility]   • Proctoscopy  7/17/2013     Performed by Kolton Montgomery M.D. at [facility]   • Biopsy general  7/17/2013     Performed by Kolton Montgomery M.D. at SURGERY [facility]   • Recovery  8/26/2013     Performed by Cath-Recovery Surgery at [facility] SAME DAY [facility]   • Fistula in ano repair  9/4/2013     Performed by Kolton Montgomery M.D. at SURGERY [facility]   • Flap rotation  9/4/2013     Performed by Kolton Montgomery M.D. at SURGERY [facility]   • Irrigation & debridement general  10/23/2013     Performed by Kolton Montgomery M.D. at [facility]   • Perineal procedure  12/18/2013     Performed by Kolton Montgomery M.D. at SURGERY [facility]   • Myocutaneous flap  12/18/2013     Performed by Kolton Montgomery M.D. at [facility]   • Cataract extraction with iol  2004     bilateral   • Irrigation & debridement general  2/19/2014     Performed by Kolton Montgomery M.D. at SURGERY [facility]   • Flap graft  2/19/2014     Performed by Kolton Montgomery M.D. at [facility]   • Fistula in ano repair  10/15/2014     Performed by Kolton Montgomery M.D. at SURGERY [facility]   • Perineal procedure  10/15/2014     Performed by Kolton Montgomery M.D. at [facility]   • Fistula in ano repair  1/28/2015     Performed by Kolton Montgomery M.D. at SURGERY [facility]   • Perineal procedure  1/28/2015     Performed by Kolton DESAI  "MARIANA Montgomery at SURGERY [facility]   • Hepatic ablation laparoscopic  7/6/2015     Procedure: HEPATIC ABLATION LAPAROSCOPIC.;  Surgeon: Kit West M.D.;  Location: SURGERY [facility];  Service:    • Cath placement Left 7/6/2015     Procedure: CATH PLACEMENT CEPHALIC POWERPORT;  Surgeon: Kit West M.D.;  Location: SURGERY [facility];  Service:    • Colonoscopy with biopsy  8/23/2015     Procedure: COLONOSCOPY WITH BIOPSY;  Surgeon: Wilbur Glasgow M.D.;  Location: ENDOSCOPY [facility];  Service:      Family History   Problem Relation Age of Onset   • Heart Disease Mother    • Heart Failure Mother    • Hypertension Mother    • Hyperlipidemia Mother    • Cancer Mother    • Cancer Maternal Aunt 60     breast ca   • Lung Disease Brother      COPD/Emphysema/Smoker   • Cancer Brother      prostate cancer   • Alcohol/Drug Brother      Alcohol   • Kidney Disease Father      renal failure     History   Smoking status   • Never Smoker    Smokeless tobacco   • Never Used     Allergies   Allergen Reactions   • Codeine      \"gets drunk\"   • Oxaliplatin Anaphylaxis   • Pcn [Penicillins] Itching   • Sulfa Drugs Itching   • Tape Rash     PAPER TAPE OK     Outpatient Encounter Prescriptions as of 7/26/2017   Medication Sig Dispense Refill   • ranitidine (ZANTAC) 150 MG Tab TAKE 1 TABLET BY MOUTH 2 TIMES A DAY. 60 Tab 3   • rivaroxaban (XARELTO) 20 MG Tab tablet Take 1 Tab by mouth with dinner. 30 Tab 6   • cyanocobalamin (VITAMIN B-12) 500 MCG Tab Take 500 mcg by mouth every day.     • Cholecalciferol (VITAMIN D3) 400 UNITS CAPS Take 1 Cap by mouth every day.     • loratadine (CLARITIN) 10 MG TABS Take 10 mg by mouth every day. Indications: Hayfever     • oxycodone immediate-release (ROXICODONE) 5 MG Tab Take 1 Tab by mouth every four hours as needed for Severe Pain. 30 Tab 0   • furosemide (LASIX) 20 MG Tab Take 1 Tab by mouth every day. PRN EDEMA 30 Tab 3   • potassium chloride ER " "(KLOR-CON) 10 MEQ tablet Take 1 Tab by mouth every day. 30 Tab 3   • lidocaine-prilocaine (EMLA) 2.5-2.5 % Cream APPLY A THICK FILM OVER THE PORT ACCESS SITE PRIOR TO ACCESS 30 g 2   • loperamide (IMODIUM) 1 MG/5ML Liquid Take 2 mg by mouth 4 times a day as needed for Diarrhea.     • ondansetron (ZOFRAN) 4 MG Tab tablet      • Multiple Vitamins-Minerals (CENTRUM SILVER PO) Take  by mouth.       Facility-Administered Encounter Medications as of 7/26/2017   Medication Dose Route Frequency Provider Last Rate Last Dose   • heparin pf injection 500 Units  500 Units Intracatheter PRN Ella De, A.P.N.   500 Units at 07/25/17 0850     Review of Systems   Constitutional: Negative for malaise/fatigue.   Eyes: Negative for blurred vision and double vision.   Respiratory: Negative for cough, shortness of breath and wheezing.    Cardiovascular: Positive for chest pain (sharp pain at night of infusion ). Negative for palpitations, orthopnea and leg swelling.   Musculoskeletal: Negative for falls.   Neurological: Negative for dizziness, focal weakness, loss of consciousness, weakness and headaches.   All other systems reviewed and are negative.       Objective:   /76 mmHg  Pulse 88  Ht 1.66 m (5' 5.35\")  Wt 74.39 kg (164 lb)  BMI 27.00 kg/m2  SpO2 97%    Physical Exam   Constitutional: She is oriented to person, place, and time. She appears well-developed and well-nourished.   HENT:   Head: Normocephalic.   Neck: Normal range of motion. No JVD present.   Cardiovascular: Normal rate, regular rhythm and normal heart sounds.    No murmur heard.  Power-port present on the left upper chest, get infusion.    Pulmonary/Chest: Effort normal and breath sounds normal. No respiratory distress. She has no wheezes.   Abdominal: Bowel sounds are normal.   Musculoskeletal: She exhibits no edema or tenderness.   Neurological: She is alert and oriented to person, place, and time.   Skin: Skin is warm and dry.   Psychiatric: Her " behavior is normal. Judgment normal.   Nursing note and vitals reviewed.      Echocardiography Laboratory  9/17/2015  No prior study is available for comparison.   Normal chamber sizes.  Normal left ventricular systolic function.  Grade I diastolic dysfunction.  Trace mitral regurgitation.  Moderate aortic insufficiency.  Right ventricular systolic pressure is estimated to be normal at 24-27   mmHg.  Normal right ventricular size and systolic function.    Lab Results   Component Value Date/Time    CHOLESTEROL, 11/10/2015 08:30 AM    LDL 95 11/10/2015 08:30 AM    HDL 39* 11/10/2015 08:30 AM    TRIGLYCERIDES 292* 11/10/2015 08:30 AM       Lab Results   Component Value Date/Time    SODIUM 140 07/25/2017 08:15 AM    POTASSIUM 3.6 07/25/2017 08:15 AM    CHLORIDE 108 07/25/2017 08:15 AM    CO2 23 07/25/2017 08:15 AM    GLUCOSE 111* 07/25/2017 08:15 AM    BUN 17 07/25/2017 08:15 AM    CREATININE 0.97 07/25/2017 08:15 AM     Lab Results   Component Value Date/Time    ALKALINE PHOSPHATASE 165* 07/25/2017 08:15 AM    AST(SGOT) 34 07/25/2017 08:15 AM    ALT(SGPT) 31 07/25/2017 08:15 AM    TOTAL BILIRUBIN 0.6 07/25/2017 08:15 AM            Assessment:     1. Other pulmonary embolism without acute cor pulmonale, unspecified chronicity (CMS-HCC)     2. Abnormal EKG     3. Chemotherapy follow-up examination     4. Essential hypertension     5. Hyperlipidemia, unspecified hyperlipidemia type         Medical Decision Making:  Today's Assessment / Status / Plan:     1. History of Pulmonary embolism:  - on xarelto 20mg qd    2. Hypertension:  - stable   - continue to modify diet and exercise     3. Chemotherapy:  - sharp chest pain most likely due to chemotherapy  - denies any palpitations associated with pain     4. Hyperlipidemia:  - LDL 95, goal less than 100    Follow up in 3 months with Dr. Kelly, sooner as needed.     Collaborating Provider: Dr. Kelly

## 2017-07-27 ENCOUNTER — OUTPATIENT INFUSION SERVICES (OUTPATIENT)
Dept: ONCOLOGY | Facility: MEDICAL CENTER | Age: 77
End: 2017-07-27
Attending: NURSE PRACTITIONER
Payer: MEDICARE

## 2017-07-27 VITALS
DIASTOLIC BLOOD PRESSURE: 70 MMHG | RESPIRATION RATE: 18 BRPM | HEIGHT: 65 IN | SYSTOLIC BLOOD PRESSURE: 134 MMHG | BODY MASS INDEX: 27.77 KG/M2 | OXYGEN SATURATION: 97 % | TEMPERATURE: 97.8 F | WEIGHT: 166.67 LBS | HEART RATE: 88 BPM

## 2017-07-27 DIAGNOSIS — R94.4 DECREASED GFR: ICD-10-CM

## 2017-07-27 DIAGNOSIS — C20 RECTAL CANCER (HCC): ICD-10-CM

## 2017-07-27 PROCEDURE — 700111 HCHG RX REV CODE 636 W/ 250 OVERRIDE (IP)

## 2017-07-27 PROCEDURE — 96523 IRRIG DRUG DELIVERY DEVICE: CPT

## 2017-07-27 RX ADMIN — HEPARIN 500 UNITS: 100 SYRINGE at 15:53

## 2017-07-27 ASSESSMENT — PAIN SCALES - GENERAL: PAINLEVEL: NO PAIN

## 2017-07-27 NOTE — PROGRESS NOTES
Pt presented to infusion center for 5FU pump de-access. Tolerated chemo well. Pump had 0 ml in reservoir and 72.5 ml delivered. Pump disconnected, cleaned and returned to pharmacy in pt's labeled bag. Port flushed per protocol, brisk blood return observed, heparinized and de-accessed with needle intact, gauze dressing placed. Pt has next appt, left on foot with  in great spirits.

## 2017-07-28 DIAGNOSIS — C18.9 METASTATIC COLON CANCER TO LIVER (HCC): ICD-10-CM

## 2017-07-28 DIAGNOSIS — C78.7 METASTATIC COLON CANCER TO LIVER (HCC): ICD-10-CM

## 2017-08-08 ENCOUNTER — OUTPATIENT INFUSION SERVICES (OUTPATIENT)
Dept: ONCOLOGY | Facility: MEDICAL CENTER | Age: 77
End: 2017-08-08
Attending: NURSE PRACTITIONER
Payer: MEDICARE

## 2017-08-08 VITALS
DIASTOLIC BLOOD PRESSURE: 81 MMHG | OXYGEN SATURATION: 100 % | HEIGHT: 65 IN | RESPIRATION RATE: 18 BRPM | HEART RATE: 97 BPM | WEIGHT: 165.57 LBS | SYSTOLIC BLOOD PRESSURE: 123 MMHG | BODY MASS INDEX: 27.58 KG/M2 | TEMPERATURE: 97.1 F

## 2017-08-08 DIAGNOSIS — C20 RECTAL CANCER (HCC): ICD-10-CM

## 2017-08-08 LAB
BASOPHILS # BLD AUTO: 1.3 % (ref 0–1.8)
BASOPHILS # BLD: 0.04 K/UL (ref 0–0.12)
EOSINOPHIL # BLD AUTO: 0.12 K/UL (ref 0–0.51)
EOSINOPHIL NFR BLD: 3.9 % (ref 0–6.9)
ERYTHROCYTE [DISTWIDTH] IN BLOOD BY AUTOMATED COUNT: 55.8 FL (ref 35.9–50)
HCT VFR BLD AUTO: 39.8 % (ref 37–47)
HGB BLD-MCNC: 13.1 G/DL (ref 12–16)
IMM GRANULOCYTES # BLD AUTO: 0.02 K/UL (ref 0–0.11)
IMM GRANULOCYTES NFR BLD AUTO: 0.7 % (ref 0–0.9)
LYMPHOCYTES # BLD AUTO: 0.54 K/UL (ref 1–4.8)
LYMPHOCYTES NFR BLD: 17.8 % (ref 22–41)
MCH RBC QN AUTO: 31.6 PG (ref 27–33)
MCHC RBC AUTO-ENTMCNC: 32.9 G/DL (ref 33.6–35)
MCV RBC AUTO: 95.9 FL (ref 81.4–97.8)
MONOCYTES # BLD AUTO: 0.62 K/UL (ref 0–0.85)
MONOCYTES NFR BLD AUTO: 20.4 % (ref 0–13.4)
NEUTROPHILS # BLD AUTO: 1.7 K/UL (ref 2–7.15)
NEUTROPHILS NFR BLD: 55.9 % (ref 44–72)
NRBC # BLD AUTO: 0 K/UL
NRBC BLD AUTO-RTO: 0 /100 WBC
PLATELET # BLD AUTO: 151 K/UL (ref 164–446)
PMV BLD AUTO: 10 FL (ref 9–12.9)
RBC # BLD AUTO: 4.15 M/UL (ref 4.2–5.4)
WBC # BLD AUTO: 3 K/UL (ref 4.8–10.8)

## 2017-08-08 PROCEDURE — G0498 CHEMO EXTEND IV INFUS W/PUMP: HCPCS

## 2017-08-08 PROCEDURE — 700111 HCHG RX REV CODE 636 W/ 250 OVERRIDE (IP): Performed by: NURSE PRACTITIONER

## 2017-08-08 PROCEDURE — A4212 NON CORING NEEDLE OR STYLET: HCPCS

## 2017-08-08 PROCEDURE — 96409 CHEMO IV PUSH SNGL DRUG: CPT

## 2017-08-08 PROCEDURE — 700105 HCHG RX REV CODE 258: Performed by: NURSE PRACTITIONER

## 2017-08-08 PROCEDURE — 85025 COMPLETE CBC W/AUTO DIFF WBC: CPT

## 2017-08-08 PROCEDURE — 96375 TX/PRO/DX INJ NEW DRUG ADDON: CPT

## 2017-08-08 PROCEDURE — 96367 TX/PROPH/DG ADDL SEQ IV INF: CPT

## 2017-08-08 PROCEDURE — 700111 HCHG RX REV CODE 636 W/ 250 OVERRIDE (IP)

## 2017-08-08 RX ORDER — ESOMEPRAZOLE MAGNESIUM 10 MG/1
GRANULE, FOR SUSPENSION, EXTENDED RELEASE ORAL
COMMUNITY
End: 2017-12-12

## 2017-08-08 RX ORDER — DEXAMETHASONE SODIUM PHOSPHATE 4 MG/ML
8 INJECTION, SOLUTION INTRA-ARTICULAR; INTRALESIONAL; INTRAMUSCULAR; INTRAVENOUS; SOFT TISSUE ONCE
Status: COMPLETED | OUTPATIENT
Start: 2017-08-08 | End: 2017-08-08

## 2017-08-08 RX ADMIN — DEXAMETHASONE SODIUM PHOSPHATE 8 MG: 4 INJECTION, SOLUTION INTRAMUSCULAR; INTRAVENOUS at 09:24

## 2017-08-08 RX ADMIN — FLUOROURACIL 3570 MG: 50 INJECTION, SOLUTION INTRAVENOUS at 12:25

## 2017-08-08 RX ADMIN — FLUOROURACIL 595 MG: 50 INJECTION, SOLUTION INTRAVENOUS at 11:03

## 2017-08-08 RX ADMIN — LEUCOVORIN CALCIUM 744 MG: 350 INJECTION, POWDER, LYOPHILIZED, FOR SOLUTION INTRAMUSCULAR; INTRAVENOUS at 09:51

## 2017-08-08 ASSESSMENT — PAIN SCALES - GENERAL: PAINLEVEL: NO PAIN

## 2017-08-08 NOTE — PROGRESS NOTES
Patient presents ambulatory for C33D1 of chemotherapy.  Patient reports feeling well and denies any s/s of infection at this time.  Port accessed using sterile technique, blood return noted, and labs collected per MD order.  Labs reviewed and are within parameters to proceed with treatment.  Leucovorin infused per MD order with no complications or adverse reactions.  5 FU push given intermediate through leucovorin infusion with no complications or adverse reactions.  Blood return verified post leucovorin infusion prior to connecting home infusion pump, positive blood return noted.  5FU pump connected, 1.6 mL/hr verified with two RNs, and started.  Patient to return 8/10/17 for next appt for pump disconnect, confirmed with patient.  Patient left ambulatory in no distress.

## 2017-08-08 NOTE — PROGRESS NOTES
"Pharmacy Chemotherapy Verification    Patient Name: Karlene Walters      Dx: Colon CA     Cycle: 41 (Lanse cycle 33)    Previous treatment: 7/25/17    Protocol: 5 FU/Leucovorin    Leucovorin 400 mg/m² IV over 2 hours on Day 1, followed by  Fluorouracil 400 mg/m²  IVP on Day 1, followed by   - Dose reduced by 20% to 320 mg/m² starting with Cycle 18 (Lanse #11) on 7/5/16 due to neutropenia    Fluorouracil 1200 mg/m²  IV continuous infusion daily on Days 1 and 2 (2400 mg/m² IV over 46-48 hours) -   - Infusion Dose reduced by 20% starting with Cycle 18 (Lanse #11) on 7/5/16 due to neutropenia   -20% reduction (1920 mg/m2) to be continued starting C28 per C Alsop APRN   14 day cycles for 12 cycles (adjuvant) or until disease progression or unacceptable toxicity  NCCN Guidelines for Colon Cancer. V.2.2015.  Jay T, et al. Eur J Cancer.1999;35(9):1343-7.          Allergies: Codeine; Oxaliplatin; Pcn; Sulfa drugs; and Tape  /81 mmHg  Pulse 97  Temp(Src) 36.2 °C (97.1 °F)  Resp 18  Ht 1.66 m (5' 5.35\")  Wt 75.1 kg (165 lb 9.1 oz)  BMI 27.25 kg/m2  SpO2 100% Body surface area is 1.86 meters squared.     ANC~ 1700 Plt = 151k   Hgb = 13.1     SCr = 0.97mg/dL CrCl ~ 58mL/min   LFT's = WNL, except AP = 165 TBili = 0.6      Leucovorin 400 mg/m² x 1.86m² = 744 mg   <5% difference, OK to treat with final dose = 744 mg IV    Fluorouracil (5-FU) 320 mg/m² x 1.86 m² = 595 mg   <5% difference, OK to treat with final dose = 595 mg IVP    Fluorouracil (5-FU) 1920 mg/m2 x 1.86 m2 = 3571 mg   <5% difference, OK to treat with final dose = 3570 mg CIVI over 46 hours  Via home infusion pump at 1.6 ml/hr     Zhao SalinasD.      "

## 2017-08-08 NOTE — PROGRESS NOTES
Chemotherapy Verification - PRIMARY RN      Height = 65.35 in   Weight = 75.1 kg   BSA = 1.86 m2       Medication: 5 FU  Dose: 320 mg/m2  Calculated Dose: 595.2 mg                             (In mg/m2, AUC, mg/kg)     Medication: Leucovorin  Dose: 400 mg/m2  Calculated Dose: 744 mg                             (In mg/m2, AUC, mg/kg)    Medication: 5 FU pump  Dose: 1920 mg/m2 over 46 hours  Calculated Dose: 3565 mg                             (In mg/m2, AUC, mg/kg)    I confirm this process was performed independently with the BSA and all final chemotherapy dosing calculations congruent.  Any discrepancies of 5% or greater have been addressed with the chemotherapy pharmacist. The resolution of the discrepancy has been documented in the EPIC progress notes.

## 2017-08-08 NOTE — PROGRESS NOTES
Chemotherapy Verification - SECONDARY RN       Height = 166 cm  Weight = 75.1 kg  BSA = 1.86 m2       Medication: Home infusional 5FU  Dose: 1920 mg/m2 dr  Calculated Dose: 3572.9                             (In mg/m2, AUC, mg/kg)     Medication: 5FU  Dose: 320 mg/m2  Calculated Dose: 595.2 mg                             (In mg/m2, AUC, mg/kg)      I confirm that this process was performed independently.

## 2017-08-08 NOTE — PROGRESS NOTES
".Pharmacy Chemotherapy Verification    Patient Name: Karlene Walters      Dx: Colon CA      Protocol: 5-FU+Leucovorin      *Dosing Reference*  Leucovorin 400 mg/m² IV over 2 hours on Day 1, followed by  Fluorouracil 400 mg/m²  IVP on Day 1, followed by    - Dose reduced by 20% to 320 mg/m² starting with Cycle 18 (Oakley #11) on 7/5/16 due to neutropenia    Fluorouracil 1200 mg/m²  IV continuous infusion daily on Days 1 and 2 (2400 mg/m² IV over 46-48 hours) -    - Infusion Dose reduced by 20% starting with Cycle 18 (Oakley #11) on 7/5/16 due to neutropenia   -20% reduction (1920 mg/m2) to be continued starting C28 per C Alsop APRN   14 day cycles for 12 cycles (adjuvant) or until disease progression or unacceptable toxicity  NCCN Guidelines for Colon Cancer. V.2.2015.  Jay GROVER, et al. Eur J Cancer.1999;35(9):1343-7.      Allergies:  Codeine; Oxaliplatin; Pcn; Sulfa drugs; and Tape     /81 mmHg  Pulse 97  Temp(Src) 36.2 °C (97.1 °F)  Resp 18  Ht 1.66 m (5' 5.35\")  Wt 75.1 kg (165 lb 9.1 oz)  BMI 27.25 kg/m2  SpO2 100% Body surface area is 1.86 meters squared.      Labs 8/8/17:  ANC~ 1700 Plt = 151k   Hgb = 13.1     Labs 7/25/17:   SCr = 0.97 mg/dL CrCl ~ 58 mL/min   LFT's = WNL except   TBili = 0.6   K+ = 3.6    Drug Order   (Drug name, dose, route, IV Fluid & volume, frequency, number of doses) Cycle: 41 (Oakley C33)  Previous treatment:  7/25/17     Medication = Leucovorin (Wellcovorin)  Base Dose = 400 mg/m²  Calc Dose: Base Dose x 1.86 m² = 744 mg  Final Dose = 744 mg  Route = IV  Fluid & Volume = D5W 250 mL  Admin Duration = Over 1 hour         <5% difference, OK to treat with final dose   Medication = Fluorouracil (5-FU)   Base Dose = 320 mg/m²  Calc Dose: Base Dose x 1.86 m² = 595.2 mg  Final Dose = 595 mg  Route = IVP  Fluid & Volume = 11.9 mL in syringe  Conc = 50 mg/mL  Admin Duration = Over 5 minutes         <5% difference, OK to treat with final dose   Medication = Fluorouracil " (5-FU)   Base Dose = 1920 mg/m²/46 hours  Calc Dose:Base Dose x 1.86 m² = 3571.2 mg  Final Dose = 3570 mg  Route = CIVI over 46 hours  Fluid & Volume = CADD pump 71.4 mL (plus 3 ml overfill = 74.4 mL)   Conc = 50 mg/mL  Admin Duration = Over 46 hours  Rate = 1.6 mL/hour         <5% difference, OK to treat with final dose     By my signature below, I confirm this process was performed independently with the BSA and all final chemotherapy dosing calculations congruent. I have reviewed the above chemotherapy order and that my calculation of the final dose and BSA (when applicable) corroborate those calculations of the  pharmacist.     Sofiya Pedro, PharmD

## 2017-08-10 ENCOUNTER — OUTPATIENT INFUSION SERVICES (OUTPATIENT)
Dept: ONCOLOGY | Facility: MEDICAL CENTER | Age: 77
End: 2017-08-10
Attending: NURSE PRACTITIONER
Payer: MEDICARE

## 2017-08-10 VITALS
OXYGEN SATURATION: 98 % | TEMPERATURE: 98.1 F | SYSTOLIC BLOOD PRESSURE: 112 MMHG | HEIGHT: 65 IN | BODY MASS INDEX: 27.77 KG/M2 | WEIGHT: 166.67 LBS | HEART RATE: 84 BPM | RESPIRATION RATE: 18 BRPM | DIASTOLIC BLOOD PRESSURE: 63 MMHG

## 2017-08-10 DIAGNOSIS — C20 RECTAL CANCER (HCC): ICD-10-CM

## 2017-08-10 PROCEDURE — 96523 IRRIG DRUG DELIVERY DEVICE: CPT

## 2017-08-10 PROCEDURE — 700111 HCHG RX REV CODE 636 W/ 250 OVERRIDE (IP)

## 2017-08-10 RX ADMIN — HEPARIN 500 UNITS: 100 SYRINGE at 14:06

## 2017-08-10 ASSESSMENT — PAIN SCALES - GENERAL: PAINLEVEL: NO PAIN

## 2017-08-10 NOTE — PROGRESS NOTES
Pt arrived to IS, ambulatory, for pump disconnect. Pt voices no complaints. Pump stopped upon patient arrival, 0 mL reservoir volume. Cassette disconnected and properly disposed of. Port flushed per policy, positive blood return noted. Port flushed and heparin locked per policy, port de-accessed. Pt left IS with no s/sx of distress. Follow up appointment confirmed.

## 2017-08-22 ENCOUNTER — OFFICE VISIT (OUTPATIENT)
Dept: HEMATOLOGY ONCOLOGY | Facility: MEDICAL CENTER | Age: 77
End: 2017-08-22
Payer: MEDICARE

## 2017-08-22 ENCOUNTER — NON-PROVIDER VISIT (OUTPATIENT)
Dept: HEMATOLOGY ONCOLOGY | Facility: MEDICAL CENTER | Age: 77
End: 2017-08-22
Payer: MEDICARE

## 2017-08-22 ENCOUNTER — OUTPATIENT INFUSION SERVICES (OUTPATIENT)
Dept: ONCOLOGY | Facility: MEDICAL CENTER | Age: 77
End: 2017-08-22
Attending: NURSE PRACTITIONER
Payer: MEDICARE

## 2017-08-22 ENCOUNTER — HOSPITAL ENCOUNTER (OUTPATIENT)
Facility: MEDICAL CENTER | Age: 77
End: 2017-08-22
Attending: INTERNAL MEDICINE
Payer: MEDICARE

## 2017-08-22 VITALS
HEIGHT: 65 IN | BODY MASS INDEX: 27.88 KG/M2 | RESPIRATION RATE: 18 BRPM | HEART RATE: 86 BPM | WEIGHT: 167.33 LBS | SYSTOLIC BLOOD PRESSURE: 120 MMHG | TEMPERATURE: 97.3 F | DIASTOLIC BLOOD PRESSURE: 70 MMHG | OXYGEN SATURATION: 96 %

## 2017-08-22 VITALS
OXYGEN SATURATION: 96 % | HEART RATE: 86 BPM | HEIGHT: 65 IN | SYSTOLIC BLOOD PRESSURE: 120 MMHG | TEMPERATURE: 97.3 F | WEIGHT: 167 LBS | DIASTOLIC BLOOD PRESSURE: 70 MMHG | BODY MASS INDEX: 27.82 KG/M2 | RESPIRATION RATE: 18 BRPM

## 2017-08-22 VITALS
WEIGHT: 166.89 LBS | RESPIRATION RATE: 18 BRPM | BODY MASS INDEX: 27.81 KG/M2 | TEMPERATURE: 97.7 F | HEART RATE: 101 BPM | SYSTOLIC BLOOD PRESSURE: 138 MMHG | HEIGHT: 65 IN | DIASTOLIC BLOOD PRESSURE: 75 MMHG | OXYGEN SATURATION: 98 %

## 2017-08-22 DIAGNOSIS — C20 RECTAL CANCER (HCC): ICD-10-CM

## 2017-08-22 DIAGNOSIS — C78.7 SECONDARY MALIGNANT NEOPLASM OF LIVER (HCC): ICD-10-CM

## 2017-08-22 DIAGNOSIS — Z51.11 ENCOUNTER FOR ANTINEOPLASTIC CHEMOTHERAPY: ICD-10-CM

## 2017-08-22 DIAGNOSIS — R94.4 DECREASED GFR: ICD-10-CM

## 2017-08-22 LAB
ALBUMIN SERPL BCP-MCNC: 3.6 G/DL (ref 3.2–4.9)
ALBUMIN/GLOB SERPL: 1.3 G/DL
ALP SERPL-CCNC: 168 U/L (ref 30–99)
ALT SERPL-CCNC: 27 U/L (ref 2–50)
ANION GAP SERPL CALC-SCNC: 7 MMOL/L (ref 0–11.9)
AST SERPL-CCNC: 32 U/L (ref 12–45)
BASOPHILS # BLD AUTO: 1.3 % (ref 0–1.8)
BASOPHILS # BLD: 0.04 K/UL (ref 0–0.12)
BILIRUB SERPL-MCNC: 0.6 MG/DL (ref 0.1–1.5)
BUN SERPL-MCNC: 20 MG/DL (ref 8–22)
CALCIUM SERPL-MCNC: 9.4 MG/DL (ref 8.5–10.5)
CEA SERPL-MCNC: 2.8 NG/ML (ref 0–3)
CHLORIDE SERPL-SCNC: 108 MMOL/L (ref 96–112)
CO2 SERPL-SCNC: 22 MMOL/L (ref 20–33)
CREAT SERPL-MCNC: 1.03 MG/DL (ref 0.5–1.4)
DEPRECATED D DIMER PPP IA-ACNC: <200 NG/ML(D-DU)
EOSINOPHIL # BLD AUTO: 0.09 K/UL (ref 0–0.51)
EOSINOPHIL NFR BLD: 2.8 % (ref 0–6.9)
ERYTHROCYTE [DISTWIDTH] IN BLOOD BY AUTOMATED COUNT: 56.8 FL (ref 35.9–50)
GFR SERPL CREATININE-BSD FRML MDRD: 52 ML/MIN/1.73 M 2
GLOBULIN SER CALC-MCNC: 2.8 G/DL (ref 1.9–3.5)
GLUCOSE SERPL-MCNC: 89 MG/DL (ref 65–99)
HCT VFR BLD AUTO: 38.8 % (ref 37–47)
HGB BLD-MCNC: 12.7 G/DL (ref 12–16)
IMM GRANULOCYTES # BLD AUTO: 0.02 K/UL (ref 0–0.11)
IMM GRANULOCYTES NFR BLD AUTO: 0.6 % (ref 0–0.9)
LYMPHOCYTES # BLD AUTO: 0.71 K/UL (ref 1–4.8)
LYMPHOCYTES NFR BLD: 22.4 % (ref 22–41)
MCH RBC QN AUTO: 31 PG (ref 27–33)
MCHC RBC AUTO-ENTMCNC: 32.7 G/DL (ref 33.6–35)
MCV RBC AUTO: 94.6 FL (ref 81.4–97.8)
MONOCYTES # BLD AUTO: 0.71 K/UL (ref 0–0.85)
MONOCYTES NFR BLD AUTO: 22.4 % (ref 0–13.4)
NEUTROPHILS # BLD AUTO: 1.6 K/UL (ref 2–7.15)
NEUTROPHILS NFR BLD: 50.5 % (ref 44–72)
NRBC # BLD AUTO: 0 K/UL
NRBC BLD AUTO-RTO: 0 /100 WBC
PLATELET # BLD AUTO: 159 K/UL (ref 164–446)
PMV BLD AUTO: 10.1 FL (ref 9–12.9)
POTASSIUM SERPL-SCNC: 3.8 MMOL/L (ref 3.6–5.5)
PROT SERPL-MCNC: 6.4 G/DL (ref 6–8.2)
RBC # BLD AUTO: 4.1 M/UL (ref 4.2–5.4)
SODIUM SERPL-SCNC: 137 MMOL/L (ref 135–145)
WBC # BLD AUTO: 3.2 K/UL (ref 4.8–10.8)

## 2017-08-22 PROCEDURE — 96367 TX/PROPH/DG ADDL SEQ IV INF: CPT

## 2017-08-22 PROCEDURE — 700105 HCHG RX REV CODE 258: Performed by: INTERNAL MEDICINE

## 2017-08-22 PROCEDURE — 700111 HCHG RX REV CODE 636 W/ 250 OVERRIDE (IP): Performed by: INTERNAL MEDICINE

## 2017-08-22 PROCEDURE — 700111 HCHG RX REV CODE 636 W/ 250 OVERRIDE (IP)

## 2017-08-22 PROCEDURE — 96366 THER/PROPH/DIAG IV INF ADDON: CPT

## 2017-08-22 PROCEDURE — 99214 OFFICE O/P EST MOD 30 MIN: CPT | Performed by: INTERNAL MEDICINE

## 2017-08-22 PROCEDURE — 96375 TX/PRO/DX INJ NEW DRUG ADDON: CPT

## 2017-08-22 PROCEDURE — G0498 CHEMO EXTEND IV INFUS W/PUMP: HCPCS

## 2017-08-22 PROCEDURE — 700105 HCHG RX REV CODE 258

## 2017-08-22 PROCEDURE — 96409 CHEMO IV PUSH SNGL DRUG: CPT

## 2017-08-22 PROCEDURE — 36591 DRAW BLOOD OFF VENOUS DEVICE: CPT | Performed by: INTERNAL MEDICINE

## 2017-08-22 PROCEDURE — 85379 FIBRIN DEGRADATION QUANT: CPT

## 2017-08-22 RX ORDER — DEXAMETHASONE SODIUM PHOSPHATE 4 MG/ML
8 INJECTION, SOLUTION INTRA-ARTICULAR; INTRALESIONAL; INTRAMUSCULAR; INTRAVENOUS; SOFT TISSUE ONCE
Status: COMPLETED | OUTPATIENT
Start: 2017-08-22 | End: 2017-08-22

## 2017-08-22 RX ADMIN — LEUCOVORIN CALCIUM 748 MG: 350 INJECTION, POWDER, LYOPHILIZED, FOR SOLUTION INTRAMUSCULAR; INTRAVENOUS at 11:53

## 2017-08-22 RX ADMIN — DEXAMETHASONE SODIUM PHOSPHATE 8 MG: 4 INJECTION, SOLUTION INTRAMUSCULAR; INTRAVENOUS at 11:21

## 2017-08-22 RX ADMIN — FLUOROURACIL 3590 MG: 50 INJECTION, SOLUTION INTRAVENOUS at 13:50

## 2017-08-22 RX ADMIN — FLUOROURACIL 600 MG: 50 INJECTION, SOLUTION INTRAVENOUS at 12:58

## 2017-08-22 ASSESSMENT — PAIN SCALES - GENERAL
PAINLEVEL: 4=SLIGHT-MODERATE PAIN
PAINLEVEL: 4=SLIGHT-MODERATE PAIN
PAINLEVEL: NO PAIN

## 2017-08-22 NOTE — PROGRESS NOTES
"Pharmacy Chemotherapy Calculations Note:    Patient Name: Karlene Walters        Dx: Colon CA  Cycle:  42 Previous treatment: C41 = 8/8/17     Protocol: 5 FU/Leucovorin    Leucovorin 400 mg/m² IV over 2 hours on Day 1, followed by  Fluorouracil 400 mg/m²  IVP on Day 1, followed by    - Dose reduced by 20% to 320 mg/m² starting with Cycle 18 ([place] #11) on 7/5/16 due to neutropenia    Fluorouracil 1200 mg/m²  IV continuous infusion daily on Days 1 and 2 (2400 mg/m² IV over 46-48 hours) -    - Infusion Dose reduced by 20% starting with Cycle 18 ([place] #11) on 7/5/16 due to neutropenia   - 20% reduction (1920 mg/m²) to be continued starting C28 per Alsop APRN   14 day cycles for 12 cycles (adjuvant) or until disease progression or unacceptable toxicity    NCCN Guidelines for Colon Cancer. V.2.2015.  Jay T, et al. Eur J Cancer.1999;35(9):1343-7.          /75 mmHg  Pulse 101  Temp(Src) 36.5 °C (97.7 °F)  Resp 18  Ht 1.66 m (5' 5.35\")  Wt 75.7 kg (166 lb 14.2 oz)  BMI 27.47 kg/m2  SpO2 98% Body surface area is 1.87 meters squared.  ANC~ 1600  Plt = 159 k    SCr = 1.03 mg/dL CrCl = 55 mL/min  LFT's = WNL  TBili = 0.6     Leucovorin (Wellcovorin) 400 mg/m² x 1.87 m² = 748 mg   <5% difference, ok to treat with final written dose = 748 mg    Fluorouracil (5-FU) 320 mg/m² x 1.87 m² = 598 mg   <5% difference, ok to treat with final written dose = 600 mg    Fluorouracil (5-FU) 1920 mg/m²/dose x 1.87 m² = 3590 mg   <5% difference, ok to treat with final written dose = 3590 mg CIVI over 46 hours via CADD pump (1.6 mL/hr)    Rudy Arthur, PharmD, BCPS             "

## 2017-08-22 NOTE — PROGRESS NOTES
Patient arrives ambulatory to IS for cycle 34 of 5FU/Leucovorin, labs reviewed and patient meets parameters for treatment today.  Plan of care reviewed with patient and she is in agreement.  Premedications given and were tolerated well.  Leucovorin given over two hours per patient request, 5FU bolus administered at one hour yulia, per patient request.  No s/sx of reaction or intolerance noted.  Pt connected to 5FU pump.  Pt return Thursday for pump disconnect.  Pt left IS in NAD under care of spouse.

## 2017-08-22 NOTE — PROGRESS NOTES
Date of visit: 8/22/2017  9:55 AM      Chief Complaint- Metastatic Invasive moderately differenated rectal cancer to liver, for follow-up      History of presenting illness: Karlene Walters  is a 77 y.o. year old female who is here for follow-up of metastatic rectal carcinoma.    -Originally diagnosed with a rectal carcinoma in 2012 secondary to bleeding. s/pt laparoscopic resection with primary anastomosis and final pathology was consistent with well-differentiated adenocarcinoma with 0/30 nodes. She was staged pT2 N0. 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     8/2015 she was diagnosed with metastatic disease to the liver with a 2.4 cm mass as well as small pulmonary nodules and thyroid nodules. She underwent an ablative therapy to the liver. She was then started on Xelox. She had  allergic reaction to oxaliplatin and was continued on Xeloda. This eventually led to increased diarrhea and her regimen was changed to single agent 5-FU. She has been tolerating the therapy well with good responses on imaging.     3/2017- CT of the chest abdomen pelvis which has been stable and no evidence of disease. CT of the wrist was consistent with tenosynovitis and degenerative changes.    5/17-established care with me   EGD and colonoscopy  did not reveal any local recurrence. She had some gastritis.   - CEA has trended up to 13  -PET scan isolated hepatic lesion. Patchy basilar pulmonary opacities. Seen by Dr. West referred for radial embolization  6/17- Y90 Theraspheres.  Continued on 5-FU.    She is here for follow-up. Her CEA level has trended down to normal range. She has noticed worsening exertional dyspnea. He has some occasional leg discomfort. She is tolerating the treatment otherwise well.    Past Medical History:      Past Medical History   Diagnosis Date   • Blood transfusion 1957   •  Hypercholesteremia    • Dental disorder      dentures UPPERS AND LOWERS   • Obstruction      ileostomy   • Other specified symptom associated with female genital organs      HYSTERECTOMY 1993   • CATARACT      removed b/l   • Seasonal allergies    • Ileostomy in place (CMS-HCC)    • Fall    • Indigestion    • Arrhythmia    • Lightheadedness 9/17/2015   • Routine general medical examination at a health care facility 3/14/2016     3/14/16   • Renal insufficiency 3/14/2016   • Chemotherapy-induced neutropenia (CMS-HCC) 9/28/2016   • 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)    • Cancer (CMS-HCC) 09/2012     rectal cancer,  Liver CA-2015   • Hypertension      no meds currently    • Pneumonia 05/2017     tx'd with antibx   • Hemorrhagic disorder (CMS-HCC)      on Xarelto    • Blood clotting disorder (CMS-HCC) 08/2015     bilat PE        Past surgical history:       Past Surgical History   Procedure Laterality Date   • Tonsillectomy     • Abdominal hysterectomy total  1983   • Eye surgery       cataracts   • Cyst excision  1972     ovarian   • Other  2009     left eye torn retina repair   • Colonoscopy flex w/endo us  9/20/2012     Performed by Harshil Bolton M.D. at SURGERY [facility]   • Colectomy  9/26/2012     Performed by Kolton Montgomery M.D. at SURGERY [facility]   • Laparoscopy  10/8/2012     Performed by Kolton Montgomery M.D. at [facility]   • Ileo loop diversion  10/8/2012     Performed by Kolton Montgomery M.D. at [facility]   • Sigmoidoscopy  2/27/2013     Performed by Kolton Montgomery M.D. at [facility]   • Fistula in ano repair  2/27/2013     Performed by Kolton Montgomery M.D. at [facility]   • Proctoscopy  7/17/2013     Performed by Kolton Montgomery M.D. at [facility]   • Biopsy general  7/17/2013     Performed by Kolton Montgomery M.D. at [facility]   • Recovery  8/26/2013     Performed by Cath-Recovery Surgery at SURGERY SAME DAY  [facility]   • Fistula in ano repair  9/4/2013     Performed by Kolton Montgomery M.D. at SURGERY [facility]   • Flap rotation  9/4/2013     Performed by Kolton Montgomery M.D. at SURGERY [facility]   • Irrigation & debridement general  10/23/2013     Performed by Kolton Montgomery M.D. at SURGERY [facility]   • Perineal procedure  12/18/2013     Performed by Kolton Montgomery M.D. at SURGERY [facility]   • Myocutaneous flap  12/18/2013     Performed by Kolton Montgomery M.D. at [facility]   • Cataract extraction with iol  2004     bilateral   • Irrigation & debridement general  2/19/2014     Performed by Kolton Montgomery M.D. at [facility]   • Flap graft  2/19/2014     Performed by Kolton Montgomery M.D. at [facility]   • Fistula in ano repair  10/15/2014     Performed by Kolton Montgomery M.D. at SURGERY [facility]   • Perineal procedure  10/15/2014     Performed by Kolton Montgomery M.D. at [facility]   • Fistula in ano repair  1/28/2015     Performed by Kolton Montgomery M.D. at [facility]   • Perineal procedure  1/28/2015     Performed by Kolton Montgomery M.D. at [facility]   • Hepatic ablation laparoscopic  7/6/2015     Procedure: HEPATIC ABLATION LAPAROSCOPIC.;  Surgeon: Kit West M.D.;  Location: SURGERY [facility];  Service:    • Cath placement Left 7/6/2015     Procedure: CATH PLACEMENT CEPHALIC POWERPORT;  Surgeon: Kit West M.D.;  Location: SURGERY [facility];  Service:    • Colonoscopy with biopsy  8/23/2015     Procedure: COLONOSCOPY WITH BIOPSY;  Surgeon: Wilbur Glasgow M.D.;  Location: ENDOSCOPY [facility];  Service:        Allergies:       Codeine; Oxaliplatin; Pcn; Sulfa drugs; and Tape    Medications:         Current Outpatient Prescriptions   Medication Sig Dispense Refill   • ranitidine (ZANTAC) 150 MG Tab TAKE 1 TABLET BY MOUTH 2 TIMES A DAY. 60 Tab 3   • potassium chloride ER (KLOR-CON)  10 MEQ tablet Take 1 Tab by mouth every day. 30 Tab 3   • rivaroxaban (XARELTO) 20 MG Tab tablet Take 1 Tab by mouth with dinner. 30 Tab 6   • lidocaine-prilocaine (EMLA) 2.5-2.5 % Cream APPLY A THICK FILM OVER THE PORT ACCESS SITE PRIOR TO ACCESS 30 g 2   • cyanocobalamin (VITAMIN B-12) 500 MCG Tab Take 500 mcg by mouth every day.     • Multiple Vitamins-Minerals (CENTRUM SILVER PO) Take  by mouth.     • Cholecalciferol (VITAMIN D3) 400 UNITS CAPS Take 1 Cap by mouth every day.     • loratadine (CLARITIN) 10 MG TABS Take 10 mg by mouth every day. Indications: Hayfever     • Esomeprazole Magnesium 10 MG Pack Take  by mouth.     • oxycodone immediate-release (ROXICODONE) 5 MG Tab Take 1 Tab by mouth every four hours as needed for Severe Pain. 30 Tab 0   • furosemide (LASIX) 20 MG Tab Take 1 Tab by mouth every day. PRN EDEMA 30 Tab 3   • loperamide (IMODIUM) 1 MG/5ML Liquid Take 2 mg by mouth 4 times a day as needed for Diarrhea.     • ondansetron (ZOFRAN) 4 MG Tab tablet        Current Facility-Administered Medications   Medication Dose Route Frequency Provider Last Rate Last Dose   • heparin pf injection 500 Units  500 Units Intracatheter PRN Ella De, A.P.N.   500 Units at 08/22/17 0856         Social History:     Social History     Social History   • Marital Status:      Spouse Name: N/A   • Number of Children: N/A   • Years of Education: N/A     Occupational History   • Not on file.     Social History Main Topics   • Smoking status: Never Smoker    • Smokeless tobacco: Never Used   • Alcohol Use: No      Comment: rare   • Drug Use: No   • Sexual Activity: No     Other Topics Concern   • Not on file     Social History Narrative       Family History:      Family History   Problem Relation Age of Onset   • Heart Disease Mother    • Heart Failure Mother    • Hypertension Mother    • Hyperlipidemia Mother    • Cancer Mother    • Cancer Maternal Aunt 60     breast ca   • Lung Disease Brother       "COPD/Emphysema/Smoker   • Cancer Brother      prostate cancer   • Alcohol/Drug Brother      Alcohol   • Kidney Disease Father      renal failure       Review of Systems:  All other review of systems are negative except what was mentioned above in the HPI.    Constitutional: Negative for fever, chills, weight loss. Positive for mild malaise/fatigue.    HEENT: No new auditory or visual complaints. No sore throat and neck pain.     Respiratory: Negative for cough, sputum production, prior worsening shortness of breath     Cardiovascular: Negative for chest pain, palpitations, orthopnea and leg swelling.    Gastrointestinal: Negative for heartburn, nausea, vomiting and abdominal pain.    Genitourinary: Negative for dysuria, hematuria    Musculoskeletal: No new arthralgias or myalgias   Skin: Negative for rash and itching.    Neurological: Negative for focal weakness and headaches.    Endo/Heme/Allergies: No abnormal bleed/bruise.    Psychiatric/Behavioral: No new depression/anxiety.    Physical Exam:  Vitals: /70 mmHg  Pulse 86  Temp(Src) 36.3 °C (97.3 °F)  Resp 18  Ht 1.66 m (5' 5.35\")  Wt 75.751 kg (167 lb)  BMI 27.49 kg/m2  SpO2 96%  Breastfeeding? No    General: Not in acute distress, alert and oriented x 3  HEENT: No pallor, icterus. Oropharynx clear.   Neck: Supple, no palpable masses.  Lymph nodes: No palpable cervical, supraclavicular, axillary or inguinal lymphadenopathy.    CVS: regular rate and rhythm, no rubs or gallops  RESP: Clear to auscultate bilaterally, no wheezing or crackles.   ABD: Soft, non tender, non distended, positive bowel sounds, no palpable organomegaly  EXT: 1+ bipedal edema. Nonspecific shin/calf discomfort  CNS: Alert and oriented x3, No focal deficits.  Skin- No rash      Labs:   Hospital Outpatient Visit on 08/22/2017   Component Date Value Ref Range Status   • WBC 08/22/2017 3.2* 4.8 - 10.8 K/uL Final   • RBC 08/22/2017 4.10* 4.20 - 5.40 M/uL Final   • Hemoglobin " 08/22/2017 12.7  12.0 - 16.0 g/dL Final   • Hematocrit 08/22/2017 38.8  37.0 - 47.0 % Final   • MCV 08/22/2017 94.6  81.4 - 97.8 fL Final   • MCH 08/22/2017 31.0  27.0 - 33.0 pg Final   • MCHC 08/22/2017 32.7* 33.6 - 35.0 g/dL Final   • RDW 08/22/2017 56.8* 35.9 - 50.0 fL Final   • Platelet Count 08/22/2017 159* 164 - 446 K/uL Final   • MPV 08/22/2017 10.1  9.0 - 12.9 fL Final   • Neutrophils-Polys 08/22/2017 50.50  44.00 - 72.00 % Final   • Lymphocytes 08/22/2017 22.40  22.00 - 41.00 % Final   • Monocytes 08/22/2017 22.40* 0.00 - 13.40 % Final   • Eosinophils 08/22/2017 2.80  0.00 - 6.90 % Final   • Basophils 08/22/2017 1.30  0.00 - 1.80 % Final   • Immature Granulocytes 08/22/2017 0.60  0.00 - 0.90 % Final   • Nucleated RBC 08/22/2017 0.00   Final   • Neutrophils (Absolute) 08/22/2017 1.60* 2.00 - 7.15 K/uL Final    Includes immature neutrophils, if present.   • Lymphs (Absolute) 08/22/2017 0.71* 1.00 - 4.80 K/uL Final   • Monos (Absolute) 08/22/2017 0.71  0.00 - 0.85 K/uL Final   • Eos (Absolute) 08/22/2017 0.09  0.00 - 0.51 K/uL Final   • Baso (Absolute) 08/22/2017 0.04  0.00 - 0.12 K/uL Final   • Immature Granulocytes (abs) 08/22/2017 0.02  0.00 - 0.11 K/uL Final   • NRBC (Absolute) 08/22/2017 0.00   Final   • Sodium 08/22/2017 137  135 - 145 mmol/L Final   • Potassium 08/22/2017 3.8  3.6 - 5.5 mmol/L Final   • Chloride 08/22/2017 108  96 - 112 mmol/L Final   • Co2 08/22/2017 22  20 - 33 mmol/L Final   • Anion Gap 08/22/2017 7.0  0.0 - 11.9 Final   • Glucose 08/22/2017 89  65 - 99 mg/dL Final   • Bun 08/22/2017 20  8 - 22 mg/dL Final   • Creatinine 08/22/2017 1.03  0.50 - 1.40 mg/dL Final   • Calcium 08/22/2017 9.4  8.5 - 10.5 mg/dL Final   • AST(SGOT) 08/22/2017 32  12 - 45 U/L Final   • ALT(SGPT) 08/22/2017 27  2 - 50 U/L Final   • Alkaline Phosphatase 08/22/2017 168* 30 - 99 U/L Final   • Total Bilirubin 08/22/2017 0.6  0.1 - 1.5 mg/dL Final   • Albumin 08/22/2017 3.6  3.2 - 4.9 g/dL Final   • Total Protein  08/22/2017 6.4  6.0 - 8.2 g/dL Final   • Globulin 08/22/2017 2.8  1.9 - 3.5 g/dL Final   • A-G Ratio 08/22/2017 1.3   Final   • Carcinoembryonic Antigen 08/22/2017 2.8  0.0 - 3.0 ng/mL Final    Comment: Effective September 17, 2013 the quantitative determination  of Carcinoembryonic Antigen (CEA) will now be performed at  [facility] the [facility].  The Access CEA paramagnetic  particle chemiluminescent immunoassay is used.  Results  obtained with different test methods or kits cannot be used interchangeably.  Measurement of CEA has been shown to be  clinically relevant in the management of patients with  colorectal, breast, lung, prostatic, pancreatic, and ovarian  carcinomas.  Smokers may have slightly elevated levels of CEA.     • GFR If  08/22/2017 >60  >60 mL/min/1.73 m 2 Final   • GFR If Non  08/22/2017 52* >60 mL/min/1.73 m 2 Final             Assessment and Plan:  1. Metastatic rectal cancer, KRAS positive: She is currently on single agent 5-FU and has been tolerating it well. She had a rather indolent course, but had isolated relapse in the liver. She is status post Y90 theraspheres with normalization of her CEA level. Radiology has ordered a follow-up CT scan next month. We will continue 5-FU for now.    2. Worsening exertional dyspnea. A d-dimer  from today was normal. She has prior history of PE. However, she is already on anticoagulation. She did have some patchy lung opacities seen in the prior PET scan. I will obtain CT of his chest and if she has worsening infiltrate. We will ask pulmonary to see her.    3 History of pulmonary embolus: He was diagnosed in 8/2015 and was initially treated with Coumadin then changed to Xarelto. She had completed 6 months of therapy but was continued on anticoagulation secondary to underlying malignancy  4 Anal repair and reconstruction: Patient was seen by Dr. Montgomery and has had multiple surgeries to this area. Further surgery has been  deferred. She continues to follow-up with Dr. Montgomery periodically.    She agreed and verbalized her agreement and understanding with the current plan.  I answered all questions and concerns she has at this time         Please note that this dictation was created using voice recognition software. I have made every reasonable attempt to correct obvious errors, but I expect that there are errors of grammar and possibly content that I did not discover before finalizing the note.      SIGNATURES:  Eric Hardy    CC:  Manan Quiñonez M.D.  No ref. provider found

## 2017-08-22 NOTE — NON-PROVIDER
"Karlene Walters is a 77 y.o. female here for a non-provider visit for: Lab Draws  on 8/22/2017 at 8:15 AM    Procedure Performed: Port access with blood draw    Anatomical site: Left chest    Equipment used: Port Access Kit- 20g x 3/4\"    Labs drawn: CBC w/diff, CEA and CMP     Ordering Provider: Dr. Eric Hardy    [place] By: Jamie Boudreaux R.N.    Patient arrived ambulatory accompanied by her .  Pt with emla cream applied to left port site. Port accessed in sterile fashion with brisk blood return verified.  Biopatch applied to port site and skin prep applied.  Labs drawn per written treatment plan by Dr. Hardy.  Port flushed with 20ml NS and 5ml heparin. Port remains accessed for chemotherapy treatment today.   "

## 2017-08-22 NOTE — PROGRESS NOTES
Chemotherapy Verification - PRIMARY RN      Height = 166 cm  Weight = 75.7 kg  BSA = 1.87 m2        Medication: Fluorouracil home infusion   Dose: 1,920 mg/m2  Calculated Dose: 3,590.4 mg over 46 hours                            (In mg/m2, AUC, mg/kg)     Medication: Fluorouracil IV push  Dose: 320 mg/m2  Calculated Dose: 598.4 mg                             (In mg/m2, AUC, mg/kg)    I confirm this process was performed independently with the BSA and all final chemotherapy dosing calculations congruent.  Any discrepancies of 5% or greater have been addressed with the chemotherapy pharmacist. The resolution of the discrepancy has been documented in the EPIC progress notes.

## 2017-08-22 NOTE — PROGRESS NOTES
Chemotherapy Verification - SECONDARY RN       Height = 166 cm  Weight = 75.7 kg  BSA = 1.87 m2       Medication: Fluorouracil  Dose: 320 mg/m2 (80% of original dose 400 mg/m2)  Calculated Dose: 598.4 mg                                 Medication: Fluorouracil home infusion  Dose: 1920 mg/m2 (80% of original dose 2400 mg/m2) Calculated Dose: 3590.4 mg                                I confirm that this process was performed independently.

## 2017-08-22 NOTE — PROGRESS NOTES
".Pharmacy Chemotherapy Verification    Patient Name: Karlene Walters      Dx: Colon CA      Protocol: 5-FU+Leucovorin      *Dosing Reference*  Leucovorin 400 mg/m² IV over 2 hours on Day 1, followed by  Fluorouracil 400 mg/m²  IVP on Day 1, followed by    - Dose reduced by 20% to 320 mg/m² starting with Cycle 18 ([place] #11) on 7/5/16 due to neutropenia    Fluorouracil 1200 mg/m²  IV continuous infusion daily on Days 1 and 2 (2400 mg/m² IV over 46-48 hours) -    - Infusion Dose reduced by 20% starting with Cycle 18 ([place] #11) on 7/5/16 due to neutropenia   -20% reduction (1920 mg/m2) to be continued starting C28 per C Alsop APRN   14 day cycles for 12 cycles (adjuvant) or until disease progression or unacceptable toxicity  NCCN Guidelines for Colon Cancer. V.2.2015.  Jay GROVER, et al. Eur J Cancer.1999;35(9):1343-7.      Allergies:  Codeine; Oxaliplatin; Pcn; Sulfa drugs; and Tape     /75 mmHg  Pulse 101  Temp(Src) 36.5 °C (97.7 °F)  Resp 18  Ht 1.66 m (5' 5.35\")  Wt 75.7 kg (166 lb 14.2 oz)  BMI 27.47 kg/m2  SpO2 98% Body surface area is 1.87 meters squared.      Labs 8/22/17:  ANC~ 1600 Plt = 159k   Hgb = 12.7   SCr = 1.03 mg/dL CrCl ~ 55 mL/min LFT's = WNL except  TBili = 0.6   K+ = 3.8    Drug Order   (Drug name, dose, route, IV Fluid & volume, frequency, number of doses) Cycle: 42 ([place] C34)  Previous treatment:  8/8/17     Medication = Leucovorin (Wellcovorin)  Base Dose = 400 mg/m²  Calc Dose: Base Dose x 1.87 m² = 748 mg  Final Dose = 748 mg  Route = IV  Fluid & Volume = D5W 250 mL  Admin Duration = Over 1 hour         <5% difference, OK to treat with final dose   Medication = Fluorouracil (5-FU)   Base Dose = 320 mg/m²  Calc Dose: Base Dose x 1.87 m² = 598.4 mg  Final Dose = 600 mg  Route = IVP  Fluid & Volume = 12 mL in syringe  Conc = 50 mg/mL  Admin Duration = Over 5 minutes         <5% difference, OK to treat with final dose   Medication = Fluorouracil (5-FU)   Base Dose = 1920 " mg/m²/46 hours  Calc Dose:Base Dose x 1.87 m² = 3590.4 mg  Final Dose = 3590 mg  Route = CIVI over 46 hours  Fluid & Volume = CADD pump 71.8 mL (plus 3 ml overfill = 74.8 mL)   Conc = 50 mg/mL  Admin Duration = Over 46 hours  Rate = 1.6 mL/hour         <5% difference, OK to treat with final dose     By my signature below, I confirm this process was performed independently with the BSA and all final chemotherapy dosing calculations congruent. I have reviewed the above chemotherapy order and that my calculation of the final dose and BSA (when applicable) corroborate those calculations of the  pharmacist.     Sofiya Pedro, PharmD

## 2017-08-22 NOTE — MR AVS SNAPSHOT
"        Karlene Gilbert Romeo   2017 8:30 AM   Non-Provider Visit   MRN: [id number]    Department:  [facility]   Dept Phone:  [phone]    Description:  Female : 1940   Provider:  ONC RN 2           Allergies as of 2017     Allergen Noted Reactions    Codeine 2011       \"gets drunk\"    Oxaliplatin 10/27/2015   Anaphylaxis    Pcn [Penicillins] 2011   Itching    Sulfa Drugs 2011   Itching    Tape 2013   Rash    PAPER TAPE OK      Vital Signs     Blood Pressure Pulse Temperature Respirations Height Weight    120/70 mmHg 86 36.3 °C (97.3 °F) (Temporal) 18 1.66 m (5' 5.35\") 75.9 kg (167 lb 5.3 oz)    Body Mass Index Oxygen Saturation Smoking Status             27.54 kg/m2 96% Never Smoker          Basic Information     Date Of Birth Sex Race Ethnicity Preferred Language    1940 Female White Non- English      Your appointments     Aug 22, 2017 10:00 AM   ONCOLOGY EST PATIENT 30 MIN with Eric Hardy M.D.   [facility] (--)    [address], [address]  [facility] [address] [phone]            Aug 22, 2017 11:00 AM   Est Chemo 3 with RN 1   Infusion Services ([facility])    [address] [address]   [phone]            Aug 24, 2017  3:00 PM   EST Port Flush / Central Line Care with INFUSION QUICK INJECT   Infusion Services ([facility])    [address] [address]   [phone]            Sep 05, 2017  8:30 AM   Est Chemo 3 with RN 3   Infusion Services ([facility])    [address] [address]   [phone]            Sep 07, 2017  1:30 PM   EST Port Flush / Central Line Care with INFUSION QUICK INJECT   Infusion Services ([facility])    [address] [address]   [phone]            Sep 08, 2017 10:00 AM   CT GEN60 with [address] 1   [facility] - CT - [address]    [address]  [facility] [address] [phone]            Sep 14, 2017  8:00 AM "   IR LOW LEVEL CONSULT with [facility] LOW-LEVEL CONSULT OFFICE   [facility] IMAGING - INTERVENTIONAL - REG[facility]AL MEDICAL CTR ([facility])    [facility]  [address] [facility]  [place] [address]   [phone]            Sep 19, 2017  8:30 AM   Non Provider 1 with ONC RN 2   [facility] (--)    [address], [address]  [place] [address]   [phone]           You will be receiving a confirmation call a few days before your appointment from our automated call confirmation system.            Sep 19, 2017 10:00 AM   ONCOLOGY EST PATIENT 30 MIN with Eric Hardy M.D.   [facility] (--)    [address], [address]  [place] [address]   [phone]            Sep 19, 2017 11:00 AM   Est Chemo 3 with RN 1   Infusion Services ([facility])    [address] [facility] [address]  [place] [address]   [phone]            Sep 21, 2017  3:30 PM   EST Port Flush / Central Line Care with RN 5   Infusion Services ([facility])    [address] [facility] [address]  [place] [address]   [phone]            Oct 11, 2017  8:45 AM   CardioOncology New Patient with Ahsan Kelly M.D.   [facility] (--)    [address], [address]  [place] [address]   [phone]           Please make sure to bring list of all current medications; Frequency and Dosage.            Nov 13, 2017  9:40 AM   Established Patient with Manan Quiñonez M.D.   [facility] [address]    [address] [facility]  [address]  [place] [address]   [phone]           You will be receiving a confirmation call a few days before your appointment from our automated call confirmation system.              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   10/15/2014 - Present    Thyroid nodule E04.1   12/19/2014 - Present    Secondary malignant neoplasm of liver (CMS-HCC) C78.7   7/6/2015 - Present    Dehydration E86.0   8/14/2015 - Present    Nausea & vomiting R11.2 Medium  8/15/2015 - Present    Pulmonary embolism (CMS-HCC) I26.99 High  8/18/2015 - Present    Ileitis K52.9   8/24/2015 - Present    Diarrhea R19.7   8/24/2015 - Present    Hyperlipidemia E78.5   9/17/2015 - Present    Lightheadedness R42   9/17/2015 - Present    Shortness of breath R06.02 High  10/5/2015 - Present    Routine general medical examination at a health care facility Z00.00   3/14/2016 - Present    Renal insufficiency N28.9   3/14/2016 - Present    Hyperglycemia R73.9   3/14/2016 - Present    Decreased GFR R94.4   8/16/2016 - Present    Chemotherapy-induced neutropenia (CMS-HCC) D70.1   9/28/2016 - Present    Uncontrolled pain R52   6/15/2017 - Present      Health Maintenance        Date Due Completion Dates    BONE DENSITY 4/13/2005 ---    IMM INFLUENZA (1) 9/1/2017 10/18/2014, 9/5/2013, 10/12/2012    IMM PNEUMOCOCCAL 65+ (ADULT) LOW/MEDIUM RISK SERIES (2 of 2 - PCV13) 5/3/2018 (Originally 9/5/2007) 9/5/2006    IMM DTaP/Tdap/Td Vaccine (1 - Tdap) 5/3/2018 (Originally 4/13/1959) ---    COLONOSCOPY 8/23/2025 8/23/2015, 9/20/2012            Current Immunizations     Influenza TIV (IM) 9/5/2013  6:12 AM, 10/12/2012  6:09 PM    Influenza Vaccine Quad Inj (Preserved) 10/18/2014    Pneumococcal polysaccharide vaccine (PPSV-23) 9/5/2006    SHINGLES VACCINE 1/14/2015      Below and/or attached are the medications your provider expects you to take. Review all of your home medications and newly ordered medications with your provider  and/or pharmacist. Follow medication instructions as directed by your provider and/or pharmacist. Please keep your medication list with you and share with your provider. Update the information when medications are discontinued, doses are changed, or new medications (including over-the-counter products) are added; and carry medication information at all times in the event of emergency situations     Allergies:  CODEINE - (reactions not documented)     OXALIPLATIN - Anaphylaxis     PCN - Itching     SULFA DRUGS - Itching     TAPE - Rash               Medications  Valid as of: August 22, 2017 -  8:45 AM    Generic Name Brand Name Tablet Size Instructions for use    Cholecalciferol (Cap) Vitamin D3 400 units Take 1 Cap by mouth every day.        Cyanocobalamin (Tab) VITAMIN B-12 500 MCG Take 500 mcg by mouth every day.        Esomeprazole Magnesium (Pack) Esomeprazole Magnesium 10 MG Take  by mouth.        Furosemide (Tab) LASIX 20 MG Take 1 Tab by mouth every day. PRN EDEMA        Lidocaine-Prilocaine (Cream) EMLA 2.5-2.5 % APPLY A THICK FILM OVER THE PORT ACCESS SITE PRIOR TO ACCESS        Loperamide HCl (Liquid) IMODIUM 1 MG/5ML Take 2 mg by mouth 4 times a day as needed for Diarrhea.        Loratadine (Tab) CLARITIN 10 MG Take 10 mg by mouth every day. Indications: Hayfever        Multiple Vitamins-Minerals   Take  by mouth.        Ondansetron HCl (Tab) ZOFRAN 4 MG         OxyCODONE HCl (Tab) ROXICODONE 5 MG Take 1 Tab by mouth every four hours as needed for Severe Pain.        Potassium Chloride (Tab CR) KLOR-CON 10 MEQ Take 1 Tab by mouth every day.        RaNITidine HCl (Tab) ZANTAC 150 MG TAKE 1 TABLET BY MOUTH 2 TIMES A DAY.        Rivaroxaban (Tab) XARELTO 20 MG Take 1 Tab by mouth with dinner.        .                 Medicines prescribed today were sent to:     [facility] - [place], [place] - [address]    [address] [facility] [place] [address]    Phone: [phone] Fax:  [phone]    Open 24 Hours?: No    [facility] - [place], [place] - [address] [facility]    [address] [facility] [place] [address]    Phone: [phone] Fax: [phone]    Open 24 Hours?: No      Medication refill instructions:       If your prescription bottle indicates you have medication refills left, it is not necessary to call your provider’s office. Please contact your pharmacy and they will refill your medication.    If your prescription bottle indicates you do not have any refills left, you may request refills at any time through one of the following ways: The online [employer] system (except Urgent Care), by calling your provider’s office, or by asking your pharmacy to contact your provider’s office with a refill request. Medication refills are processed only during regular business hours and may not be available until the next business day. Your provider may request additional information or to have a follow-up visit with you prior to refilling your medication.   *Please Note: Medication refills are assigned a new Rx number when refilled electronically. Your pharmacy may indicate that no refills were authorized even though a new prescription for the same medication is available at the pharmacy. Please request the medicine by name with the pharmacy before contacting your provider for a refill.           [employer] Access Code: Activation code not generated  Current [employer] Status: Active

## 2017-08-22 NOTE — MR AVS SNAPSHOT
"Karlene Walters   2017 10:00 AM   Office Visit   MRN: [id number]    Department:  [facility]   Dept Phone:  [phone]    Description:  Female : 1940   Provider:  Eric Hardy M.D.           Reason for Visit     Establish Care prechemo      Allergies as of 2017     Allergen Noted Reactions    Codeine 2011       \"gets drunk\"    Oxaliplatin 10/27/2015   Anaphylaxis    Pcn [Penicillins] 2011   Itching    Sulfa Drugs 2011   Itching    Tape 2013   Rash    PAPER TAPE OK      You were diagnosed with     Rectal cancer (CMS-HCC)   [612862]       Secondary malignant neoplasm of liver (CMS-HCC)   [197.7.ICD-9-CM]         Vital Signs     Blood Pressure Pulse Temperature Respirations Height Weight    120/70 mmHg 86 36.3 °C (97.3 °F) 18 1.66 m (5' 5.35\") 75.751 kg (167 lb)    Body Mass Index Oxygen Saturation Breastfeeding? Smoking Status          27.49 kg/m2 96% No Never Smoker         Basic Information     Date Of Birth Sex Race Ethnicity Preferred Language    1940 Female White Non- English      Your appointments     Aug 22, 2017 11:00 AM   Est Chemo 3 with RN 1   Infusion Services ([facility])    [address]  [facility] [address]   [phone]            Aug 24, 2017  3:00 PM   EST Port Flush / Central Line Care with INFUSION QUICK INJECT   Infusion Services ([facility])    [address] [address]   [phone]            Sep 05, 2017  8:00 AM   ONCOLOGY EST PATIENT 30 MIN with ARTURO Arteaga   [facility] (--)    [address], [address]  [facility] [address] [phone]            Sep 05, 2017  8:30 AM   Est Chemo 3 with RN 3   Infusion Services ([facility])    [address]  [facility] [address] [phone]            Sep 07, 2017  1:30 PM   EST Port Flush / Central Line Care with INFUSION QUICK INJECT   Infusion Services ([facility])    [address] [address]   "   [phone]            Sep 08, 2017 10:00 AM   CT GEN60 with [address] 1   [facility] - CT - [address]    [address]  [place] [address]   [phone]            Sep 14, 2017  8:00 AM   IR LOW LEVEL CONSULT with [facility] LOW-LEVEL CONSULT OFFICE   [facility] IMAGING - INTERVENTIONAL - REGIONAL MEDICAL CTR ([facility])    [facility]  [address] [facility]  [place] [address]   [phone]            Sep 19, 2017  8:30 AM   Non Provider 1 with ONC RN 2   [facility] (--)    [address] [facility]  [place] [address]   [phone]           You will be receiving a confirmation call a few days before your appointment from our automated call confirmation system.            Sep 19, 2017 10:00 AM   ONCOLOGY EST PATIENT 30 MIN with Eric Hardy M.D.   [facility] (--)    [address]  [place] [address]   [phone]            Sep 19, 2017 11:00 AM   Est Chemo 3 with RN 1   Infusion Services ([facility])    [address] [facility] [address]  [place] [address]   [phone]            Sep 21, 2017  3:30 PM   EST Port Flush / Central Line Care with RN 5   Infusion Services ([facility])    [address] [facility] [address]  [place] [address]   [phone]            Oct 11, 2017  8:45 AM   CardioOncology New Patient with Ahsan Kelly M.D.   [facility] (--)    [address], [address]  [place] [address]   [phone]           Please make sure to bring list of all current medications; Frequency and Dosage.            Nov 13, 2017  9:40 AM   Established Patient with Manan Quiñonez M.D.   [facility] [address]    [address]  [address]  [place] [address]   [phone]           You will be receiving a confirmation call a few days before your appointment from our automated call confirmation system.              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   10/15/2014 - Present    Thyroid nodule E04.1   12/19/2014 - Present    Secondary malignant neoplasm of liver (CMS-HCC) C78.7   7/6/2015 - Present    Dehydration E86.0   8/14/2015 - Present    Nausea & vomiting R11.2 Medium  8/15/2015 - Present    Pulmonary embolism (CMS-HCC) I26.99 High  8/18/2015 - Present    Ileitis K52.9   8/24/2015 - Present    Diarrhea R19.7   8/24/2015 - Present    Hyperlipidemia E78.5   9/17/2015 - Present    Lightheadedness R42   9/17/2015 - Present    Shortness of breath R06.02 High  10/5/2015 - Present    Routine general medical examination at a health care facility Z00.00   3/14/2016 - Present    Renal insufficiency N28.9   3/14/2016 - Present    Hyperglycemia R73.9   3/14/2016 - Present    Decreased GFR R94.4   8/16/2016 - Present    Chemotherapy-induced neutropenia (CMS-HCC) D70.1   9/28/2016 - Present    Uncontrolled pain R52   6/15/2017 - Present      Health Maintenance        Date Due Completion Dates    BONE DENSITY 4/13/2005 ---    IMM INFLUENZA (1) 9/1/2017 10/18/2014, 9/5/2013, 10/12/2012    IMM PNEUMOCOCCAL 65+ (ADULT) LOW/MEDIUM RISK SERIES (2 of 2 - PCV13) 5/3/2018 (Originally 9/5/2007) 9/5/2006    IMM DTaP/Tdap/Td Vaccine (1 - Tdap) 5/3/2018 (Originally 4/13/1959) ---    COLONOSCOPY 8/23/2025 8/23/2015, 9/20/2012            Current Immunizations     Influenza TIV (IM) 9/5/2013  6:12 AM, 10/12/2012  6:09 PM    Influenza Vaccine Quad Inj (Preserved) 10/18/2014    Pneumococcal polysaccharide vaccine  (PPSV-23) 9/5/2006    SHINGLES VACCINE 1/14/2015      Below and/or attached are the medications your provider expects you to take. Review all of your home medications and newly ordered medications with your provider and/or pharmacist. Follow medication instructions as directed by your provider and/or pharmacist. Please keep your medication list with you and share with your provider. Update the information when medications are discontinued, doses are changed, or new medications (including over-the-counter products) are added; and carry medication information at all times in the event of emergency situations     Allergies:  CODEINE - (reactions not documented)     OXALIPLATIN - Anaphylaxis     PCN - Itching     SULFA DRUGS - Itching     TAPE - Rash               Medications  Valid as of: August 22, 2017 - 10:29 AM    Generic Name Brand Name Tablet Size Instructions for use    Cholecalciferol (Cap) Vitamin D3 400 units Take 1 Cap by mouth every day.        Cyanocobalamin (Tab) VITAMIN B-12 500 MCG Take 500 mcg by mouth every day.        Esomeprazole Magnesium (Pack) Esomeprazole Magnesium 10 MG Take  by mouth.        Furosemide (Tab) LASIX 20 MG Take 1 Tab by mouth every day. PRN EDEMA        Lidocaine-Prilocaine (Cream) EMLA 2.5-2.5 % APPLY A THICK FILM OVER THE PORT ACCESS SITE PRIOR TO ACCESS        Loperamide HCl (Liquid) IMODIUM 1 MG/5ML Take 2 mg by mouth 4 times a day as needed for Diarrhea.        Loratadine (Tab) CLARITIN 10 MG Take 10 mg by mouth every day. Indications: Hayfever        Multiple Vitamins-Minerals   Take  by mouth.        Ondansetron HCl (Tab) ZOFRAN 4 MG         OxyCODONE HCl (Tab) ROXICODONE 5 MG Take 1 Tab by mouth every four hours as needed for Severe Pain.        Potassium Chloride (Tab CR) KLOR-CON 10 MEQ Take 1 Tab by mouth every day.        RaNITidine HCl (Tab) ZANTAC 150 MG TAKE 1 TABLET BY MOUTH 2 TIMES A DAY.        Rivaroxaban (Tab) XARELTO 20 MG Take 1 Tab by mouth with dinner.         .                 Medicines prescribed today were sent to:     [facility] - [place], [place] - [address]    [address] [place] [address]    Phone: [phone] Fax: [phone]    Open 24 Hours?: No    [facility] - [place], [place] - [address] [facility]    [address] [facility] [place] [address]    Phone: [phone] Fax: [phone]    Open 24 Hours?: No      Medication refill instructions:       If your prescription bottle indicates you have medication refills left, it is not necessary to call your provider’s office. Please contact your pharmacy and they will refill your medication.    If your prescription bottle indicates you do not have any refills left, you may request refills at any time through one of the following ways: The online [employer] [facility] [employer] system (except Urgent Care), by calling your provider’s office, or by asking your pharmacy to contact your provider’s office with a refill request. Medication refills are processed only during regular business hours and may not be available until the next business day. Your provider may request additional information or to have a follow-up visit with you prior to refilling your medication.   *Please Note: Medication refills are assigned a new Rx number when refilled electronically. Your pharmacy may indicate that no refills were authorized even though a new prescription for the same medication is available at the pharmacy. Please request the medicine by name with the pharmacy before contacting your provider for a refill.        Your To Do List     Future Labs/Procedures Complete By Expires    CT-CTA CHEST WITH & W/O-POST PROCESS  As directed 8/22/2018         [facility] Access Code: Activation code not generated  Current [facility] [employer] Status: Active

## 2017-08-24 ENCOUNTER — HOSPITAL ENCOUNTER (OUTPATIENT)
Dept: RADIOLOGY | Facility: MEDICAL CENTER | Age: 77
End: 2017-08-24
Attending: INTERNAL MEDICINE
Payer: MEDICARE

## 2017-08-24 ENCOUNTER — OUTPATIENT INFUSION SERVICES (OUTPATIENT)
Dept: ONCOLOGY | Facility: MEDICAL CENTER | Age: 77
End: 2017-08-24
Attending: NURSE PRACTITIONER
Payer: MEDICARE

## 2017-08-24 VITALS
DIASTOLIC BLOOD PRESSURE: 68 MMHG | SYSTOLIC BLOOD PRESSURE: 128 MMHG | TEMPERATURE: 97 F | RESPIRATION RATE: 18 BRPM | HEART RATE: 72 BPM | HEIGHT: 65 IN | WEIGHT: 164.9 LBS | BODY MASS INDEX: 27.47 KG/M2 | OXYGEN SATURATION: 99 %

## 2017-08-24 DIAGNOSIS — R94.4 DECREASED GFR: ICD-10-CM

## 2017-08-24 DIAGNOSIS — C20 RECTAL CANCER (HCC): ICD-10-CM

## 2017-08-24 DIAGNOSIS — C78.7 SECONDARY MALIGNANT NEOPLASM OF LIVER (HCC): ICD-10-CM

## 2017-08-24 PROCEDURE — 700111 HCHG RX REV CODE 636 W/ 250 OVERRIDE (IP)

## 2017-08-24 PROCEDURE — 96523 IRRIG DRUG DELIVERY DEVICE: CPT

## 2017-08-24 PROCEDURE — A4212 NON CORING NEEDLE OR STYLET: HCPCS

## 2017-08-24 RX ADMIN — IOHEXOL 100 ML: 350 INJECTION, SOLUTION INTRAVENOUS at 16:22

## 2017-08-24 RX ADMIN — HEPARIN 500 UNITS: 100 SYRINGE at 15:07

## 2017-08-24 ASSESSMENT — PAIN SCALES - GENERAL: PAINLEVEL: NO PAIN

## 2017-08-24 NOTE — PROGRESS NOTES
"Pt presented to infusion center for 5FU pump de-access. Pump had 0 ml in reservoir and 74 ml delivered. Pump disconnected, cleaned and returned to pharmacy in pt's labeled bag, pt uses own konrad pack. At first attempt, port would not flush. Pt reported port had been accessed in MD office with .75\" needle when she needs a 1\" because they were \"out\". She reports pump had been \"beeping a lot\" throughout infusion and when she would manipulate her port a little it would stop. Port able to be flushed with some manipulation of needle, brisk blood return observed. Port de-accessed with needle intact. Upon inspection of site after de-access, some subcutaneous swelling observed around port. Swelling was not noted upon arrival so suspect infiltrate is normal saline flush. No redness or irritation noted, no pain per pt. Pt has had 5FU infiltrate in the past and is aware of symptoms to look out for and will report them to the MD. Pt is having CT scan with contrast following this appt and needs to be re-accessed with power needle. Port accessed by Cori BOWIE with 1\" power needle. Pt aware she needs to be heparinized before being de-accessed. Left on foot in good spirits in NAD. Has next cycle scheduled.   "

## 2017-09-05 ENCOUNTER — OFFICE VISIT (OUTPATIENT)
Dept: HEMATOLOGY ONCOLOGY | Facility: MEDICAL CENTER | Age: 77
End: 2017-09-05
Payer: MEDICARE

## 2017-09-05 ENCOUNTER — OUTPATIENT INFUSION SERVICES (OUTPATIENT)
Dept: ONCOLOGY | Facility: MEDICAL CENTER | Age: 77
End: 2017-09-05
Attending: NURSE PRACTITIONER
Payer: MEDICARE

## 2017-09-05 VITALS
HEART RATE: 89 BPM | DIASTOLIC BLOOD PRESSURE: 71 MMHG | RESPIRATION RATE: 18 BRPM | HEIGHT: 65 IN | WEIGHT: 169.97 LBS | SYSTOLIC BLOOD PRESSURE: 138 MMHG | OXYGEN SATURATION: 98 % | TEMPERATURE: 97.5 F | BODY MASS INDEX: 28.32 KG/M2

## 2017-09-05 VITALS
TEMPERATURE: 98.6 F | SYSTOLIC BLOOD PRESSURE: 126 MMHG | HEIGHT: 65 IN | RESPIRATION RATE: 18 BRPM | OXYGEN SATURATION: 97 % | DIASTOLIC BLOOD PRESSURE: 86 MMHG | BODY MASS INDEX: 28.06 KG/M2 | HEART RATE: 94 BPM | WEIGHT: 168.43 LBS

## 2017-09-05 DIAGNOSIS — C20 RECTAL CANCER (HCC): ICD-10-CM

## 2017-09-05 DIAGNOSIS — D70.1 CHEMOTHERAPY-INDUCED NEUTROPENIA (HCC): ICD-10-CM

## 2017-09-05 DIAGNOSIS — R06.02 SOB (SHORTNESS OF BREATH) ON EXERTION: ICD-10-CM

## 2017-09-05 DIAGNOSIS — T45.1X5A CHEMOTHERAPY-INDUCED NEUTROPENIA (HCC): ICD-10-CM

## 2017-09-05 DIAGNOSIS — C20 MALIGNANT NEOPLASM OF RECTUM (HCC): ICD-10-CM

## 2017-09-05 DIAGNOSIS — Z86.711 HISTORY OF PULMONARY EMBOLISM: ICD-10-CM

## 2017-09-05 DIAGNOSIS — C78.7 SECONDARY MALIGNANT NEOPLASM OF LIVER (HCC): ICD-10-CM

## 2017-09-05 LAB
ALBUMIN SERPL BCP-MCNC: 3.5 G/DL (ref 3.2–4.9)
ALBUMIN/GLOB SERPL: 1.3 G/DL
ALP SERPL-CCNC: 164 U/L (ref 30–99)
ALT SERPL-CCNC: 27 U/L (ref 2–50)
ANION GAP SERPL CALC-SCNC: 8 MMOL/L (ref 0–11.9)
APTT PPP: 32.5 SEC (ref 24.7–36)
AST SERPL-CCNC: 32 U/L (ref 12–45)
BASOPHILS # BLD AUTO: 1.1 % (ref 0–1.8)
BASOPHILS # BLD: 0.04 K/UL (ref 0–0.12)
BILIRUB SERPL-MCNC: 0.9 MG/DL (ref 0.1–1.5)
BUN SERPL-MCNC: 19 MG/DL (ref 8–22)
CALCIUM SERPL-MCNC: 9.8 MG/DL (ref 8.5–10.5)
CHLORIDE SERPL-SCNC: 109 MMOL/L (ref 96–112)
CO2 SERPL-SCNC: 23 MMOL/L (ref 20–33)
CREAT SERPL-MCNC: 1.02 MG/DL (ref 0.5–1.4)
EOSINOPHIL # BLD AUTO: 0.05 K/UL (ref 0–0.51)
EOSINOPHIL NFR BLD: 1.4 % (ref 0–6.9)
ERYTHROCYTE [DISTWIDTH] IN BLOOD BY AUTOMATED COUNT: 62 FL (ref 35.9–50)
GFR SERPL CREATININE-BSD FRML MDRD: 52 ML/MIN/1.73 M 2
GLOBULIN SER CALC-MCNC: 2.8 G/DL (ref 1.9–3.5)
GLUCOSE SERPL-MCNC: 90 MG/DL (ref 65–99)
HCT VFR BLD AUTO: 38.2 % (ref 37–47)
HGB BLD-MCNC: 12.5 G/DL (ref 12–16)
IMM GRANULOCYTES # BLD AUTO: 0.02 K/UL (ref 0–0.11)
IMM GRANULOCYTES NFR BLD AUTO: 0.6 % (ref 0–0.9)
INR PPP: 1.74 (ref 0.87–1.13)
LYMPHOCYTES # BLD AUTO: 0.55 K/UL (ref 1–4.8)
LYMPHOCYTES NFR BLD: 15.3 % (ref 22–41)
MCH RBC QN AUTO: 31.6 PG (ref 27–33)
MCHC RBC AUTO-ENTMCNC: 32.7 G/DL (ref 33.6–35)
MCV RBC AUTO: 96.5 FL (ref 81.4–97.8)
MONOCYTES # BLD AUTO: 0.7 K/UL (ref 0–0.85)
MONOCYTES NFR BLD AUTO: 19.4 % (ref 0–13.4)
NEUTROPHILS # BLD AUTO: 2.24 K/UL (ref 2–7.15)
NEUTROPHILS NFR BLD: 62.2 % (ref 44–72)
NRBC # BLD AUTO: 0 K/UL
NRBC BLD AUTO-RTO: 0 /100 WBC
PLATELET # BLD AUTO: 144 K/UL (ref 164–446)
PMV BLD AUTO: 9.8 FL (ref 9–12.9)
POTASSIUM SERPL-SCNC: 4 MMOL/L (ref 3.6–5.5)
PROT SERPL-MCNC: 6.3 G/DL (ref 6–8.2)
PROTHROMBIN TIME: 20.9 SEC (ref 12–14.6)
RBC # BLD AUTO: 3.96 M/UL (ref 4.2–5.4)
SODIUM SERPL-SCNC: 140 MMOL/L (ref 135–145)
WBC # BLD AUTO: 3.6 K/UL (ref 4.8–10.8)

## 2017-09-05 PROCEDURE — 85025 COMPLETE CBC W/AUTO DIFF WBC: CPT

## 2017-09-05 PROCEDURE — 80053 COMPREHEN METABOLIC PANEL: CPT

## 2017-09-05 PROCEDURE — 96375 TX/PRO/DX INJ NEW DRUG ADDON: CPT

## 2017-09-05 PROCEDURE — 99213 OFFICE O/P EST LOW 20 MIN: CPT | Performed by: NURSE PRACTITIONER

## 2017-09-05 PROCEDURE — 85610 PROTHROMBIN TIME: CPT

## 2017-09-05 PROCEDURE — 700105 HCHG RX REV CODE 258: Performed by: NURSE PRACTITIONER

## 2017-09-05 PROCEDURE — 96367 TX/PROPH/DG ADDL SEQ IV INF: CPT

## 2017-09-05 PROCEDURE — 96409 CHEMO IV PUSH SNGL DRUG: CPT

## 2017-09-05 PROCEDURE — G0498 CHEMO EXTEND IV INFUS W/PUMP: HCPCS

## 2017-09-05 PROCEDURE — 700111 HCHG RX REV CODE 636 W/ 250 OVERRIDE (IP)

## 2017-09-05 PROCEDURE — 85730 THROMBOPLASTIN TIME PARTIAL: CPT

## 2017-09-05 PROCEDURE — 700111 HCHG RX REV CODE 636 W/ 250 OVERRIDE (IP): Performed by: NURSE PRACTITIONER

## 2017-09-05 PROCEDURE — 96366 THER/PROPH/DIAG IV INF ADDON: CPT

## 2017-09-05 PROCEDURE — A4212 NON CORING NEEDLE OR STYLET: HCPCS

## 2017-09-05 RX ORDER — ONDANSETRON 2 MG/ML
4 INJECTION INTRAMUSCULAR; INTRAVENOUS
Status: CANCELLED | OUTPATIENT
Start: 2017-09-05

## 2017-09-05 RX ORDER — LIDOCAINE HYDROCHLORIDE 10 MG/ML
10 INJECTION, SOLUTION INFILTRATION; PERINEURAL ONCE
Status: CANCELLED | OUTPATIENT
Start: 2017-09-05 | End: 2017-09-05

## 2017-09-05 RX ORDER — DEXTROSE MONOHYDRATE 50 MG/ML
INJECTION, SOLUTION INTRAVENOUS CONTINUOUS
Status: CANCELLED | OUTPATIENT
Start: 2017-09-05

## 2017-09-05 RX ORDER — DEXAMETHASONE SODIUM PHOSPHATE 4 MG/ML
8 INJECTION, SOLUTION INTRA-ARTICULAR; INTRALESIONAL; INTRAMUSCULAR; INTRAVENOUS; SOFT TISSUE ONCE
Status: CANCELLED | OUTPATIENT
Start: 2017-09-05 | End: 2017-09-05

## 2017-09-05 RX ORDER — ONDANSETRON 8 MG/1
8 TABLET, ORALLY DISINTEGRATING ORAL
Status: CANCELLED | OUTPATIENT
Start: 2017-09-05

## 2017-09-05 RX ORDER — PROCHLORPERAZINE MALEATE 10 MG
10 TABLET ORAL EVERY 6 HOURS PRN
Status: CANCELLED | OUTPATIENT
Start: 2017-09-05

## 2017-09-05 RX ORDER — DEXAMETHASONE SODIUM PHOSPHATE 4 MG/ML
8 INJECTION, SOLUTION INTRA-ARTICULAR; INTRALESIONAL; INTRAMUSCULAR; INTRAVENOUS; SOFT TISSUE ONCE
Status: COMPLETED | OUTPATIENT
Start: 2017-09-05 | End: 2017-09-05

## 2017-09-05 RX ADMIN — FLUOROURACIL 605 MG: 50 INJECTION, SOLUTION INTRAVENOUS at 11:40

## 2017-09-05 RX ADMIN — DEXAMETHASONE SODIUM PHOSPHATE 8 MG: 4 INJECTION, SOLUTION INTRAMUSCULAR; INTRAVENOUS at 09:59

## 2017-09-05 RX ADMIN — LEUCOVORIN CALCIUM 756 MG: 350 INJECTION, POWDER, LYOPHILIZED, FOR SOLUTION INTRAMUSCULAR; INTRAVENOUS at 10:35

## 2017-09-05 RX ADMIN — FLUOROURACIL 3630 MG: 50 INJECTION, SOLUTION INTRAVENOUS at 13:00

## 2017-09-05 ASSESSMENT — ENCOUNTER SYMPTOMS
SHORTNESS OF BREATH: 1
NAUSEA: 0
COUGH: 0
CONSTIPATION: 0
PALPITATIONS: 1
VOMITING: 0
FEVER: 0
DIARRHEA: 0
WEIGHT LOSS: 0
CHILLS: 0

## 2017-09-05 ASSESSMENT — PAIN SCALES - GENERAL
PAINLEVEL: NO PAIN
PAINLEVEL: 2=MINIMAL-SLIGHT

## 2017-09-05 NOTE — PROGRESS NOTES
".Pharmacy Chemotherapy Verification    Patient Name: Karlene Walters      Dx: Colon CA      Protocol: 5-FU+Leucovorin      *Dosing Reference*  Leucovorin 400 mg/m² IV over 2 hours on Day 1, followed by  Fluorouracil 400 mg/m²  IVP on Day 1, followed by    - Dose reduced by 20% to 320 mg/m² starting with Cycle 18 ([facility] #11) on 7/5/16 due to neutropenia    Fluorouracil 1200 mg/m²  IV continuous infusion daily on Days 1 and 2 (2400 mg/m² IV over 46-48 hours) -    - Infusion Dose reduced by 20% starting with Cycle 18 ([facility] #11) on 7/5/16 due to neutropenia   -20% reduction (1920 mg/m2) to be continued starting C28 per C Alsop APRN   14 day cycles for 12 cycles (adjuvant) or until disease progression or unacceptable toxicity  NCCN Guidelines for Colon Cancer. V.2.2015.  Jay GROVER, et al. Eur J Cancer.1999;35(9):1343-7.      Allergies:  Codeine; Oxaliplatin; Pcn; Sulfa drugs; and Tape     /71   Pulse 89   Temp 36.4 °C (97.5 °F)   Resp 18   Ht 1.66 m (5' 5.35\")   Wt 77.1 kg (169 lb 15.6 oz)   SpO2 98%   BMI 27.98 kg/m²  Body surface area is 1.89 meters squared.      ANC~ 2240 Plt = 144k   Hgb = 12.5     SCr = 1.02mg/dL CrCl ~ 56mL/min   LFT's = WNL, exept AP = 164 TBili = 0.9       Drug Order   (Drug name, dose, route, IV Fluid & volume, frequency, number of doses) Cycle: 43 ([facility] C35)  Previous treatment:  8/22/17     Medication = Leucovorin (Wellcovorin)  Base Dose = 400 mg/m²  Calc Dose: Base Dose x 1.89m² = 756mg  Final Dose = 756mg  Route = IV  Fluid & Volume = D5W 250 mL  Admin Duration = Over 1 hour         <5% difference, OK to treat with final dose   Medication = Fluorouracil (5-FU)   Base Dose = 320 mg/m²  Calc Dose: Base Dose x 1.89m² = 604.8mg  Final Dose = 605mg  Route = IVP  Fluid & Volume = 12.1 mL in syringe  Conc = 50 mg/mL  Admin Duration = Over 5 minutes         <5% difference, OK to treat with final dose   Medication = Fluorouracil (5-FU)   Base Dose = 1920 mg/m²/46 hours  Calc " Dose:Base Dose x 1.89m² = 3628.8mg  Final Dose = 3630mg  Route = CIVI over 46 hours  Fluid & Volume = CADD pump 72.6mL (plus 3 ml overfill = 75.6mL)   Conc = 50 mg/mL  Admin Duration = Over 46 hours  Rate = 1.6mL/hour         <5% difference, OK to treat with final dose     By my signature below, I confirm this process was performed independently with the BSA and all final chemotherapy dosing calculations congruent. I have reviewed the above chemotherapy order and that my calculation of the final dose and BSA (when applicable) corroborate those calculations of the  pharmacist.     DARIN Arroyo, Pharm.D.

## 2017-09-05 NOTE — PROGRESS NOTES
"Pharmacy Chemotherapy Calculations Note:    Patient Name: Karlene Walters        Dx: Colon CA  Cycle:  43 Previous treatment: C42 = 8/22/17     Protocol: 5 FU/Leucovorin    Leucovorin 400 mg/m² IV over 2 hours on Day 1, followed by  Fluorouracil 400 mg/m²  IVP on Day 1, followed by    - Dose reduced by 20% to 320 mg/m² starting with Cycle 18 ([facility] #11) on 7/5/16 due to neutropenia    Fluorouracil 1200 mg/m²  IV continuous infusion daily on Days 1 and 2 (2400 mg/m² IV over 46-48 hours) -    - Infusion Dose reduced by 20% starting with Cycle 18 ([facility] #11) on 7/5/16 due to neutropenia   - 20% reduction (1920 mg/m²) to be continued starting C28 per Alsop APRN   14 day cycles for 12 cycles (adjuvant) or until disease progression or unacceptable toxicity    NCCN Guidelines for Colon Cancer. V.2.2015.  Jay T, et al. Eur J Cancer.1999;35(9):1343-7.          /71   Pulse 89   Temp 36.4 °C (97.5 °F)   Resp 18   Ht 1.66 m (5' 5.35\")   Wt 77.1 kg (169 lb 15.6 oz)   SpO2 98%   BMI 27.98 kg/m²    Body surface area is 1.89 meters squared.     Labs 9/5/17  ANC ~2240  Plt = 144k Hgb = 12.5   SCr = 1.02 mg/dL CrCl = 56 mL/min  LFT's = 32/27/164  TBili = 0.9     Leucovorin (Wellcovorin) 400 mg/m² x 1.89 m² = 756 mg   <5% difference, ok to treat with final written dose = 756 mg IV    Fluorouracil (5-FU) 320 mg/m² x 1.89 m² = 604.8 mg   <5% difference, ok to treat with final written dose = 605 mg IV    Fluorouracil (5-FU) 1920 mg/m²/dose x 1.89 m² = 3628.8 mg   <5% difference, ok to treat with final written dose = 3630 mg CIVI over 46 hours via CADD pump (1.6 mL/hr)    Andre Paz, PharmD               "

## 2017-09-05 NOTE — PROGRESS NOTES
"Subjective:      Karlene Walters is a 77 y.o. female who presents for Follow-Up (SOB) for rectal cancer.         HPI    Patient seen today in follow-up for rectal cancer. She is accompanied by her  for today's visit. Patient is scheduled to receive single agent 5-FU today.    Patient was seen by Dr. Dr. Hardy 2 weeks ago and due to concern for persistent shortness of breath on exertion further testing has been completed. D-dimer was within normal limits and she was sent for CT scan of the chest. CT scan was reviewed with the patient today. There is no acute pulmonary emboli identified. There was possible old thrombus proximal right upper lobe pulmonary arteries but has been unchanged when compared to previous CT scan on August 18, 2015. This attenuated left lower lobe pulmonary arteries likely sequela from prior pulmonary embolus. She does have some scattered atelectasis mild pneumonitis within the lungs. No pleural effusions were identified. Also noted was the liver lesion in the dome of the liver which has been unchanged. She still does have shortness of breath on exertion. When she is walking approximately block to block and a half she'll require to rest. She also notes some shortness of breath when doing household chores.    Patient denies any fevers, chills or weight loss. Appetite is been stable. She denies coughing. She does notice to have some heart palpitations at times and has some lower extremity edema, mostly when she is traveling long distances. She is seen by cardiology and is been on Lasix with potassium as needed. She is scheduled to establish care with the cardio oncologist, Dr. eKlly next month.    Patient denies any nausea, vomiting. She does have some watery diarrhea times the ostomy but it is controlled with Imodium as needed. She voids without difficulty.    Allergies   Allergen Reactions   • Codeine      \"gets drunk\"   • Oxaliplatin Anaphylaxis   • Pcn [Penicillins] Itching   • " Sulfa Drugs Itching   • Tape Rash     PAPER TAPE OK     Current Outpatient Prescriptions on File Prior to Visit   Medication Sig Dispense Refill   • XARELTO 20 MG Tab tablet TAKE 1 TABLET WITH DINNER. 30 Tab 6   • Esomeprazole Magnesium 10 MG Pack Take  by mouth.     • furosemide (LASIX) 20 MG Tab Take 1 Tab by mouth every day. PRN EDEMA 30 Tab 3   • potassium chloride ER (KLOR-CON) 10 MEQ tablet Take 1 Tab by mouth every day. 30 Tab 3   • lidocaine-prilocaine (EMLA) 2.5-2.5 % Cream APPLY A THICK FILM OVER THE PORT ACCESS SITE PRIOR TO ACCESS 30 g 2   • cyanocobalamin (VITAMIN B-12) 500 MCG Tab Take 500 mcg by mouth every day.     • Multiple Vitamins-Minerals (CENTRUM SILVER PO) Take  by mouth.     • Cholecalciferol (VITAMIN D3) 400 UNITS CAPS Take 1 Cap by mouth every day.     • loratadine (CLARITIN) 10 MG TABS Take 10 mg by mouth every day. Indications: Hayfever     • ranitidine (ZANTAC) 150 MG Tab TAKE 1 TABLET 2 TIMES A DAY. 60 Tab 3   • oxycodone immediate-release (ROXICODONE) 5 MG Tab Take 1 Tab by mouth every four hours as needed for Severe Pain. 30 Tab 0   • loperamide (IMODIUM) 1 MG/5ML Liquid Take 2 mg by mouth 4 times a day as needed for Diarrhea.     • ondansetron (ZOFRAN) 4 MG Tab tablet        Current Facility-Administered Medications on File Prior to Visit   Medication Dose Route Frequency Provider Last Rate Last Dose   • heparin pf injection 500 Units  500 Units Intracatheter PRN Ella De, A.P.N.   500 Units at 08/22/17 0856       Review of Systems   Constitutional: Negative for chills, fever and weight loss.   Respiratory: Positive for shortness of breath. Negative for cough.    Cardiovascular: Positive for palpitations and leg swelling (at times noted when traveling long distances and she will take Lasix per cardiology). Negative for chest pain.   Gastrointestinal: Negative for constipation, diarrhea, nausea and vomiting.   Genitourinary: Negative for dysuria.          Objective:     BP  "126/86   Pulse 94   Temp 37 °C (98.6 °F)   Resp 18   Ht 1.66 m (5' 5.35\")   Wt 76.4 kg (168 lb 6.9 oz)   SpO2 97%   BMI 27.73 kg/m²      Physical Exam   Constitutional: She is oriented to person, place, and time. She appears well-developed and well-nourished. No distress.   HENT:   Head: Normocephalic and atraumatic.   Cardiovascular: Normal rate, regular rhythm and normal heart sounds.  Exam reveals no gallop and no friction rub.    No murmur heard.  Pulmonary/Chest: Effort normal and breath sounds normal. No respiratory distress. She has no wheezes.   Abdominal: Soft. Bowel sounds are normal. She exhibits no distension. There is no tenderness.   Musculoskeletal: Normal range of motion.   Neurological: She is alert and oriented to person, place, and time.   Skin: Skin is warm and dry. She is not diaphoretic.   Psychiatric: She has a normal mood and affect. Her behavior is normal.   Vitals reviewed.      Ct-cta Chest Pulmonary Artery W/ Recons    Result Date: 8/24/2017 8/24/2017 3:49 PM HISTORY/REASON FOR EXAM:  Rectal cancer. Shortness of breath. Metastatic liver disease. TECHNIQUE/EXAM DESCRIPTION: CT angiogram scan for pulmonary embolism with contrast, with reconstructions. 1.25 mm helical sections were obtained from the lung apices through the lung bases following the rapid bolus administration of 100 mL of Omnipaque 350 nonionic contrast. Thin-section overlapping reconstruction interval was utilized. Coronal reconstructions were generated from the axial data. MIP post processing was performed and utilized for the interpretation. Low dose optimization technique was utilized for this CT exam including automated exposure control and adjustment of the mA and/or kV according to patient size. COMPARISON: 3/16/2017 FINDINGS: Pulmonary Embolism: No. Main Pulmonary Arteries: No. Segmental branches: No. Subsegmental branches: No. There are small attenuated arteries and veins within the left lower lobe unchanged " compared to 3/16/2017. Extensive thrombus is located within the left lower lobe pulmonary artery branches on CT examination of 8/18/2015. There may be old thrombus within proximal right upper lobe pulmonary arteries unchanged compared to 6/25/2015. Additional Comments: Normal size heart. Small pericardial effusion. Mild calcified plaque aorta. Mildly ectatic ascending aorta measuring 3.3 cm in diameter. No mediastinal adenopathy. Left internal jugular line tip overlies the SVC. . Lungs: Hyperexpanded lungs. Scattered atelectasis/mild pneumonitis within the lungs. No pleural effusions.. Additional findings: 2.5 x 4.0 cm hypodense lesion dome of the liver appears unchanged. Peripheral arterial enhancement identified..     1.  No acute pulmonary emboli identified. 2.  Possible old thrombus proximal right upper lobe pulmonary arteries unchanged when compared to 8/18/2015. 3.  Attenuated left lower lobe pulmonary arteries likely sequela of prior pulmonary embolus. 4.  Hyperexpanded lungs. 5.  Scattered atelectasis mild pneumonitis within the lungs. No pleural effusions. 6.  2.5 x 4.0 cm dome of the liver lesion unchanged.          Assessment/Plan:     1. Rectal cancer (CMS-HCC)  REFERRAL TO PULMONOLOGY   2. Secondary malignant neoplasm of liver (CMS-HCC)  REFERRAL TO PULMONOLOGY   3. SOB (shortness of breath) on exertion  REFERRAL TO PULMONOLOGY   4. History of pulmonary embolism  REFERRAL TO PULMONOLOGY     Plan  1. Patient is currently on single agent 5-FU every 2 weeks. She is tolerating treatment very well. She is scheduled to have labs completed this morning. If labs meet parameters okay to proceed with treatment as planned.    2. Patient does have persistent shortness of breath on exertion. CT scan does not show any acute findings. She does have a history of pulmonary embolism and is on anticoagulation. I will refer patient to pulmonary for further evaluation.    3. Patient established with cardiology. She is seen  to establish with cardio-oncology physician Dr. Kelly next month.    4. Patient is being seen in the clinic monthly while undergoing treatment every 2 months. She is due to have a follow-up CT scan following Y90 this week and will follow up with interventional radiologist Dr. Juarez next week for further evaluation. Patient to call sooner if needed.

## 2017-09-05 NOTE — PROGRESS NOTES
Chemotherapy Verification - SECONDARY RN       Height = 166 cm  Weight = 77.1 kg  BSA = 1.89 m2       Medication: Fluorouracil push  Dose: 320 mg/m2  Calculated Dose: 604.8 mg                                 Medication: Fluorouracil home pump  Dose: 1920 mg/m2  Calculated Dose: 3628 mg over 46 hours                                   I confirm that this process was performed independently.

## 2017-09-05 NOTE — PROGRESS NOTES
"Chemotherapy Verification - PRIMARY RN      Height = 65.35\"  Weight = 77.1 kg  BSA = 1.88 m2       Medication: 5 FU bolus  Dose: 320 mg/m2  Calculated Dose: 602.1 mg mg                             Medication: 5FU home pump  Dose: 1920 mg/m2  Calculated Dose: 3609.6 mg                               I confirm this process was performed independently with the BSA and all final chemotherapy dosing calculations congruent.  Any discrepancies of 5% or greater have been addressed with the chemotherapy pharmacist. The resolution of the discrepancy has been documented in the EPIC progress notes.       "

## 2017-09-07 ENCOUNTER — OUTPATIENT INFUSION SERVICES (OUTPATIENT)
Dept: ONCOLOGY | Facility: MEDICAL CENTER | Age: 77
End: 2017-09-07
Attending: NURSE PRACTITIONER
Payer: MEDICARE

## 2017-09-07 VITALS
BODY MASS INDEX: 28.54 KG/M2 | TEMPERATURE: 97.7 F | SYSTOLIC BLOOD PRESSURE: 114 MMHG | DIASTOLIC BLOOD PRESSURE: 62 MMHG | OXYGEN SATURATION: 97 % | HEIGHT: 65 IN | HEART RATE: 85 BPM | WEIGHT: 171.3 LBS | RESPIRATION RATE: 18 BRPM

## 2017-09-07 DIAGNOSIS — C20 RECTAL CANCER (HCC): ICD-10-CM

## 2017-09-07 PROCEDURE — 700111 HCHG RX REV CODE 636 W/ 250 OVERRIDE (IP)

## 2017-09-07 PROCEDURE — 96523 IRRIG DRUG DELIVERY DEVICE: CPT

## 2017-09-07 RX ADMIN — HEPARIN 500 UNITS: 100 SYRINGE at 13:34

## 2017-09-07 NOTE — PROGRESS NOTES
Pt arrives ambulatory to [facility] accompanied by spouse.  She is here for port de access following completion of 5FU pump.  Port to remain accessed today for CT scan in AM.  Pump tubing removed, port flushed per heparin protocol, NCN remains in place.   Pt denies any changes in overall assessment.    Pt dc'd to care of spouse in no apparent distress.  Future appt confirmed.  Empty chemo bag disposed of to chemo bin.  Pump wiped and placed in carry bag and stored in pump storage drawer.

## 2017-09-07 NOTE — PROGRESS NOTES
Late entry for 9/5/17 1400Pt presents ambulatory for labs and chemotherapy. Port numbed with lidocaine per pt request and port accessed with sterile technique with CT hernandez as pt is scheduled for CT on 9/8/17. Blood return noted and labs drawn, labs WNL for treatment. Leucovorin infused and after 1 hr, infusion paused and 5FU bolus given by Cori RN. Remaining leucovorin infused and 5FU pump connected by Cori RN. Pt to return on 9/7/17 for pump disconnect. Pt left ambulatory with spouse in no distress

## 2017-09-08 ENCOUNTER — HOSPITAL ENCOUNTER (OUTPATIENT)
Dept: RADIOLOGY | Facility: MEDICAL CENTER | Age: 77
End: 2017-09-08
Attending: NURSE PRACTITIONER
Payer: MEDICARE

## 2017-09-08 DIAGNOSIS — C18.9 METASTATIC COLON CANCER TO LIVER (HCC): ICD-10-CM

## 2017-09-08 DIAGNOSIS — C78.7 METASTATIC COLON CANCER TO LIVER (HCC): ICD-10-CM

## 2017-09-08 PROCEDURE — 74160 CT ABDOMEN W/CONTRAST: CPT

## 2017-09-11 ENCOUNTER — TELEPHONE (OUTPATIENT)
Dept: PULMONOLOGY | Facility: HOSPICE | Age: 77
End: 2017-09-11

## 2017-09-11 DIAGNOSIS — R06.00 DYSPNEA, UNSPECIFIED TYPE: ICD-10-CM

## 2017-09-14 ENCOUNTER — HOSPITAL ENCOUNTER (OUTPATIENT)
Dept: RADIOLOGY | Facility: MEDICAL CENTER | Age: 77
End: 2017-09-14
Attending: NURSE PRACTITIONER

## 2017-09-14 DIAGNOSIS — C78.7 METASTATIC COLON CANCER TO LIVER (HCC): ICD-10-CM

## 2017-09-14 DIAGNOSIS — C18.9 METASTATIC COLON CANCER TO LIVER (HCC): ICD-10-CM

## 2017-09-14 NOTE — CONSULTS
Interventional Radiology Follow Up     Re: Karlene Walters     MRN: [id number]   : 1940    Karlene Walters was seen today in follow up after hepatic radioembolization performed by Neymar Juarez MD at [facility] on Kasia 15, 2017.  She was referred to our service by Kit West MD and is also under the care of Ella MORALES. She has upcoming appointments with Luis Warren MD and Ahsan Kelly MD.    History of Present Illness:   has a history of rectal cancer diagnosed in . She underwent resection with a development of a colovaginal fistula, which was treated with multiple surgeries and an ileostomy and was on chemotherapy. Surveillance imaging revealed a segment 7/8 liver lesion and she underwent a microwave ablation with Dr. West in 2015.  Post operative imaging revealed a localized recurrence at the ablation site in the hepatic dome. Due to her prior multiple abdominal surgeries and tumor location, the lesion is considered unresectable and she was referred for evaluation and treatment of the right liver with radioembolization. Dr. Juarez performed mapping on 2017 and embolization of the right lobe of the liver on Kasia 15. The patient's clinical course was remarkable for admission to observation for pain management. She was discharged from [facility] on . She is seen today for review of surveillance labs and imaging studies and to plan follow up. Today, the patient complains of fatigue, dyspnea on exertion, and lower extremity swelling. The swelling improves with furosemide 40 mg. She describes coughing up thick phlegm every morning that is sometimes blood tinged. She has upcoming appointments with pulmonology and cardiology for these complaints. She has low back pain that she attributes to doing a home remodel project. She denies abdominal pain, change in bowels, and jaundice.    Past Medical History:   Diagnosis Date   • Pneumonia 2017    tx'd with  antibx   • Chemotherapy-induced neutropenia (CMS-HCC) 9/28/2016   • Routine general medical examination at a health care facility 3/14/2016    3/14/16   • Renal insufficiency 3/14/2016   • Lightheadedness 9/17/2015   • Blood clotting disorder (CMS-HCC) 08/2015    bilat PE    • Cancer (CMS-HCC) 09/2012    rectal cancer,  Liver CA-2015   • Blood transfusion 1957   • Arrhythmia    • CATARACT     removed b/l   • Dental disorder     dentures UPPERS AND LOWERS   • Fall    • Hemorrhagic disorder (CMS-HCC)     on Xarelto    • Hypercholesteremia    • Hypertension     no meds currently    • Ileostomy in place (CMS-HCC)    • Indigestion    • Obstruction     ileostomy   • Other specified symptom associated with female genital organs     HYSTERECTOMY 1993   • 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)    • Seasonal allergies      Past Surgical History:   Procedure Laterality Date   • COLONOSCOPY WITH BIOPSY  8/23/2015    Procedure: COLONOSCOPY WITH BIOPSY;  Surgeon: Wilbur Glasgow M.D.;  Location: [facility];  Service:    • HEPATIC ABLATION LAPAROSCOPIC  7/6/2015    Procedure: HEPATIC ABLATION LAPAROSCOPIC.;  Surgeon: Kit West M.D.;  Location: SURGERY [facility];  Service:    • CATH PLACEMENT Left 7/6/2015    Procedure: CATH PLACEMENT CEPHALIC POWERPORT;  Surgeon: Kit West M.D.;  Location: SURGERY [facility];  Service:    • FISTULA IN ANO REPAIR  1/28/2015    Performed by Kolton Montgomery M.D. at SURGERY [facility]   • PERINEAL PROCEDURE  1/28/2015    Performed by Kolton Montgomery M.D. at [facility]   • FISTULA IN ANO REPAIR  10/15/2014    Performed by Kolton Montgomery M.D. at [facility]   • PERINEAL PROCEDURE  10/15/2014    Performed by Kolton Montgomery M.D. at [facility]   • IRRIGATION & DEBRIDEMENT GENERAL  2/19/2014    Performed by Kolton Montgomery M.D. at [facility]   • FLAP GRAFT  2/19/2014    Performed by Kolton DESAI  MARIANA Montgomery at SURGERY [facility]   • PERINEAL PROCEDURE  12/18/2013    Performed by Kolton Montgomery M.D. at SURGERY [facility]   • MYOCUTANEOUS FLAP  12/18/2013    Performed by Kolton Montgomery M.D. at [facility]   • IRRIGATION & DEBRIDEMENT GENERAL  10/23/2013    Performed by Kolton Montgomery M.D. at SURGERY [facility]   • FISTULA IN ANO REPAIR  9/4/2013    Performed by Kolton Montgomery M.D. at SURGERY [facility]   • FLAP ROTATION  9/4/2013    Performed by Kolton Montgomery M.D. at SURGERY [facility]   • RECOVERY  8/26/2013    Performed by [facility]-Recovery Surgery at [facility] SAME DAY [facility]   • PROCTOSCOPY  7/17/2013    Performed by Kolton Montgomery M.D. at SURGERY [facility]   • BIOPSY GENERAL  7/17/2013    Performed by Kolton Montgomery M.D. at SURGERY [facility]   • SIGMOIDOSCOPY  2/27/2013    Performed by Kolton Montgomery M.D. at SURGERY [facility]   • FISTULA IN ANO REPAIR  2/27/2013    Performed by Kolton Montgomery M.D. at SURGERY [facility]   • LAPAROSCOPY  10/8/2012    Performed by Kolton Montgomery M.D. at [facility]   • ILEO LOOP DIVERSION  10/8/2012    Performed by Kolton Montgomery M.D. at SURGERY [facility]   • COLECTOMY  9/26/2012    Performed by Kolton Montgomery M.D. at SURGERY [facility]   • COLONOSCOPY FLEX W/ENDO US  9/20/2012    Performed by Harshil Bolton M.D. at SURGERY [facility]   • OTHER  2009    left eye torn retina repair   • CATARACT EXTRACTION WITH IOL  2004    bilateral   • ABDOMINAL HYSTERECTOMY TOTAL  1983   • CYST EXCISION  1972    ovarian   • EYE SURGERY      cataracts   • TONSILLECTOMY       Social History     Social History   • Marital status:      Spouse name: N/A   • Number of children: N/A   • Years of education: N/A     Occupational History   • Not on file.     Social History Main Topics   • Smoking status: Never Smoker   • Smokeless tobacco: Never Used   • Alcohol use No      Comment: rare   • Drug use: No   • Sexual  activity: No     Other Topics Concern   • Not on file     Social History Narrative   • No narrative on file     Family History   Problem Relation Age of Onset   • Heart Disease Mother    • Heart Failure Mother    • Hypertension Mother    • Hyperlipidemia Mother    • Cancer Mother    • Cancer Maternal Aunt 60     breast ca   • Lung Disease Brother      COPD/Emphysema/Smoker   • Cancer Brother      prostate cancer   • Alcohol/Drug Brother      Alcohol   • Kidney Disease Father      renal failure       Review of Systems:  Constitutional: positive for fatigue  Respiratory: positive for dyspnea on exertion and sputum  Cardiovascular: positive for dyspnea and lower extremity edema  Gastrointestinal: negative for abdominal pain, change in bowel habits and jaundice  Musculoskeletal:positive for back pain    A comprehensive 14-point review of systems was negative except as described above.     Labs:      Ref. Range 7/25/2017 08:15 8/8/2017 08:40 8/22/2017 08:11 8/22/2017 10:10 9/5/2017 09:13   Hemoglobin Latest Ref Range: 12.0 - 16.0 g/dL 13.0 13.1 12.7  12.5   Hematocrit Latest Ref Range: 37.0 - 47.0 % 39.5 39.8 38.8  38.2   MCV Latest Ref Range: 81.4 - 97.8 fL 94.5 95.9 94.6  96.5   MCH Latest Ref Range: 27.0 - 33.0 pg 31.1 31.6 31.0  31.6   MCHC Latest Ref Range: 33.6 - 35.0 g/dL 32.9 (L) 32.9 (L) 32.7 (L)  32.7 (L)   RDW Latest Ref Range: 35.9 - 50.0 fL 50.6 (H) 55.8 (H) 56.8 (H)  62.0 (H)   Platelet Count Latest Ref Range: 164 - 446 K/uL 175 151 (L) 159 (L)  144 (L)   MPV Latest Ref Range: 9.0 - 12.9 fL 9.7 10.0 10.1  9.8   Neutrophils-Polys Latest Ref Range: 44.00 - 72.00 % 57.00 55.90 50.50  62.20   Neutrophils (Absolute) Latest Ref Range: 2.00 - 7.15 K/uL 1.48 (L) 1.70 (L) 1.60 (L)  2.24   Lymphocytes Latest Ref Range: 22.00 - 41.00 % 16.50 (L) 17.80 (L) 22.40  15.30 (L)   Lymphs (Absolute) Latest Ref Range: 1.00 - 4.80 K/uL 0.43 (L) 0.54 (L) 0.71 (L)  0.55 (L)   Monocytes Latest Ref Range: 0.00 - 13.40 % 19.20 (H)  20.40 (H) 22.40 (H)  19.40 (H)   Monos (Absolute) Latest Ref Range: 0.00 - 0.85 K/uL 0.50 0.62 0.71  0.70   Eosinophils Latest Ref Range: 0.00 - 6.90 % 4.20 3.90 2.80  1.40   Eos (Absolute) Latest Ref Range: 0.00 - 0.51 K/uL 0.11 0.12 0.09  0.05   Basophils Latest Ref Range: 0.00 - 1.80 % 2.30 (H) 1.30 1.30  1.10   Baso (Absolute) Latest Ref Range: 0.00 - 0.12 K/uL 0.06 0.04 0.04  0.04   Immature Granulocytes Latest Ref Range: 0.00 - 0.90 % 0.80 0.70 0.60  0.60   Immature Granulocytes (abs) Latest Ref Range: 0.00 - 0.11 K/uL 0.02 0.02 0.02  0.02   Nucleated RBC Latest Units: /100 WBC 0.00 0.00 0.00  0.00   NRBC (Absolute) Latest Units: K/uL 0.00 0.00 0.00  0.00   Sodium Latest Ref Range: 135 - 145 mmol/L 140  137  140   Potassium Latest Ref Range: 3.6 - 5.5 mmol/L 3.6  3.8  4.0   Chloride Latest Ref Range: 96 - 112 mmol/L 108  108  109   Co2 Latest Ref Range: 20 - 33 mmol/L 23  22  23   Anion Gap Latest Ref Range: 0.0 - 11.9  9.0  7.0  8.0   Glucose Latest Ref Range: 65 - 99 mg/dL 111 (H)  89  90   Bun Latest Ref Range: 8 - 22 mg/dL 17  20  19   Creatinine Latest Ref Range: 0.50 - 1.40 mg/dL 0.97  1.03  1.02   GFR If  Latest Ref Range: >60 mL/min/1.73 m 2 >60  >60  >60   GFR If Non  Latest Ref Range: >60 mL/min/1.73 m 2 56 (A)  52 (A)  52 (A)   Calcium Latest Ref Range: 8.5 - 10.5 mg/dL 9.6  9.4  9.8   AST(SGOT) Latest Ref Range: 12 - 45 U/L 34  32  32   ALT(SGPT) Latest Ref Range: 2 - 50 U/L 31  27  27   Alkaline Phosphatase Latest Ref Range: 30 - 99 U/L 165 (H)  168 (H)  164 (H)   Total Bilirubin Latest Ref Range: 0.1 - 1.5 mg/dL 0.6  0.6  0.9   Albumin Latest Ref Range: 3.2 - 4.9 g/dL 3.7  3.6  3.5   Total Protein Latest Ref Range: 6.0 - 8.2 g/dL 6.5  6.4  6.3   Globulin Latest Ref Range: 1.9 - 3.5 g/dL 2.8  2.8  2.8   A-G Ratio Latest Units: g/dL 1.3  1.3  1.3   PT Latest Ref Range: 12.0 - 14.6 sec     20.9 (H)   INR Latest Ref Range: 0.87 - 1.13      1.74 (H)   APTT Latest Ref  Range: 24.7 - 36.0 sec     32.5   D-Dimer Screen Latest Ref Range: <250 ng/mL(D-DU)    <200    Carcinoembryonic Antigen Latest Ref Range: 0.0 - 3.0 ng/mL 6.6 (H)  2.8       Pathology:  [facility] September 26, 2012:  This case is being addended to report the results of IHC studies for  mismatch repair proteins to evaluate for Peacock syndrome. The technical  component of the IHC studies was performed by [facility], and  the interpretation was performed by Dr. Anderson at [facility]. All four  mismatch repair proteins are expressed in the tumor cells. These  findings make Peacock syndrome unlikely. These results are also reported  in the IHC section below.    Addendum Signed By: Norma Anderson M.D.  Addendum Date:  10/3/2012  Original Report Date:  09/28/2012    FINAL DIAGNOSIS:    A. Rectum:         Invasive low-grade (well-differentiated) adenocarcinoma arising          in the background of a tubular adenoma.         Invasion into the superficial most aspect of the muscularis          propria is present (pT2).         The tumor is located 1 cm above the peritoneal reflection in the          upper rectum (intraperitoneal rectum).         Surgical margins free of involvement by at least 1 cm.         Thirteen benign lymph nodes identified in this specimen (0/13).         See synoptic report below for complete details.  B. Rectal donuts:         Fragments of benign colonic mucosa consistent with anastomotic          rings.  C. Ileocolic:         Segment of distal ileum, cecum, and proximal ascending colon          demonstrating two tubular adenomas in the cecum. No high-grade          dysplasia identified.         Seventeen benign lymph nodes identified in this specimen (0/17).         No appendix identified.    Synoptic Report for Primary Carcinomas of Colon/Rectum:         -Specimen: Rectum, terminal ileum, cecum, and proximal ascending        colon.       -Procedure: Low anterior resection and right  hemicolectomy.       -Specimen Length: 11.5 cm (rectum specimen).       -Tumor Site: Upper rectum.       -Tumor Location: Tumor is located 1 cm above the peritoneal        reflection.       -Tumor Size: 1.2 cm in maximal dimension.       -Macroscopic Tumor Perforation: Not identified.       -Histologic Type: Adenocarcinoma.       -Histologic Grade: Low-grade (well-differentiated).       -Microscopic Tumor Extension: Tumor focally invades the        superficial most aspect of the muscularis propria.       -Margins:        Proximal Margin: Uninvolved by invasive carcinoma.        Distal Margin: Uninvolved by invasive carcinoma.        Circumferential (Radial) or Mesenteric Margin: Uninvolved by         invasive carcinoma.        Lateral Margin: Not applicable.        Distance from closest margin: 1 cm, distal.       -Treatment Effect: No prior treatment.       -Lymph-Vascular Invasion: Not identified.       -Perineural Invasion: Not identified.       -Tumor Deposits(discontinuous extramural extension): Not        identified.       -Type of Polyp in Which Invasive Carcinoma Arose: Tubular adenoma.       -Pathologic Staging:        Primary Tumor: pT2        Regional Lymph Nodes: pN0          Number of lymph nodes examined: 30          Number of lymph nodes involved: 0        Distant Metastasis: Not applicable.       -Additional Pathologic Findings: Tubular adenomas (2) present in        cecum.       -Ancillary Studies:        Immunohistochemistry studies for mismatched repair proteins:         -MLH1: Expressed.         -MSH2: Expressed.         -MSH6: Expressed.         -PMS2: Expressed.    Radiology:   Review of imaging obtained at [facility]:  CT hepatic mass protocol September 8, 2017:  1. Slight enlargement of the low attenuation area in the hepatic dome is nonspecific and could relate to posttreatment change in the cavity. Residual tumor would be difficult to evaluate or exclude. PET or MR would be better for further  evaluation.  2. Diffuse wall thickening of the partially visualized colon could relate to infection or inflammation.    Nuclear medicine Bremsstrahlung imaging Kasia 15, 2017:  The prescribed dose was  1.19 gb ( 31.32 mCi). The drawn dose was 1.26 gb (34.1mCi). Delivered dose after residual was measured at 1.25 gb ( 33.75 mCi). This represents  107%% of the prescribed dose and  99% of the drawn dose. Bremsstrahlung images   obtained after the Y-90 radioembolization, confirm proper delivery to the targeted region. There is no uptake outside the planned treatment area. NOTE: The patient will be followed by interventional radiology and oncology.    Nuclear medicine Quantitative lung scan June 6, 2017:  Total hepatopulmonary shunt percentage was 3.1%. No evidence of extrahepatic radiotracer deposition is identified.    CT abdomen May 24, 2017:  No significant change in area of decreased attenuation with peripheral enhancement involving the hepatic dome apparently representing recurrent metastases based on recent PET/CT.    Persistent nodular consolidation in the left lower lobe suggestive of pneumonia as described on PET/CT.     PET CT May 22, 2017:  1.  Recurrence involving the treated hepatic dome metastasis cavity now with an SUV of 11  2.  New left lower lobe patchy consolidation is hypermetabolic and most likely related to acute infection. Bronchogenic carcinoma or metastases are less likely  3.  No other hypermetabolism that may indicate malignancy. There is hepatic steatosis, pelvic floor laxity, right lower quadrant ostomy and gallstones      Current Outpatient Prescriptions   Medication Sig Dispense Refill   • ranitidine (ZANTAC) 150 MG Tab TAKE 1 TABLET 2 TIMES A DAY. 60 Tab 3   • XARELTO 20 MG Tab tablet TAKE 1 TABLET WITH DINNER. 30 Tab 6   • Esomeprazole Magnesium 10 MG Pack Take  by mouth.     • oxycodone immediate-release (ROXICODONE) 5 MG Tab Take 1 Tab by mouth every four hours as needed for Severe Pain.  "30 Tab 0   • furosemide (LASIX) 20 MG Tab Take 1 Tab by mouth every day. PRN EDEMA 30 Tab 3   • potassium chloride ER (KLOR-CON) 10 MEQ tablet Take 1 Tab by mouth every day. 30 Tab 3   • lidocaine-prilocaine (EMLA) 2.5-2.5 % Cream APPLY A THICK FILM OVER THE PORT ACCESS SITE PRIOR TO ACCESS 30 g 2   • loperamide (IMODIUM) 1 MG/5ML Liquid Take 2 mg by mouth 4 times a day as needed for Diarrhea.     • ondansetron (ZOFRAN) 4 MG Tab tablet      • cyanocobalamin (VITAMIN B-12) 500 MCG Tab Take 500 mcg by mouth every day.     • Multiple Vitamins-Minerals (CENTRUM SILVER PO) Take  by mouth.     • Cholecalciferol (VITAMIN D3) 400 UNITS CAPS Take 1 Cap by mouth every day.     • loratadine (CLARITIN) 10 MG TABS Take 10 mg by mouth every day. Indications: Hayfever       Current Facility-Administered Medications   Medication Dose Route Frequency Provider Last Rate Last Dose   • heparin pf injection 500 Units  500 Units Intracatheter PRN Ella De, A.P.N.   500 Units at 08/22/17 0856       Allergies   Allergen Reactions   • Codeine      \"gets drunk\"   • Oxaliplatin Anaphylaxis   • Pcn [Penicillins] Itching   • Sulfa Drugs Itching   • Tape Rash     PAPER TAPE OK       Physical Exam:  General Appearance: healthy, alert, no distress, cooperative  Lungs: negative findings: normal respiratory rate and rhythm  Extremities: negative findings: no erythema, induration, or nodules, no evidence of muscle atrophy, positive findings: bilateral lower extremity non pitting pretibial edema, left greater than right  Ambulates greater than 100 feet independently with steady gait.  ECOG Performance Status 1    Impression:   1. Unresectable metastatic rectal carcinoma to the right hepatic lobe.  2. Hypertension.  3. Hyperlipidemia.  4. Chemotherapy induced neutropenia.  5. Renal insufficiency.  6. History of pulmonary embolus, on Xarelto.    Plan:   Neymar Juarez MD has reviewed 's history and imaging studies, examined the patient, " and discussed treatment options.  We discussed the method of the procedure at length and reviewed imaging studies. All questions were answered. There is no definitive change in the appearance of the treated right hepatic lesion. Her CEA has decreased, which could be related to the Y90 SIRT, the chemotherapy, or a combination of both. We discussed the need to continue surveillance and she has monthly CEAs tracked by her medical oncologist. We will plan a hepatic mass protocol abdomen CT in 6 months or sooner if her CEA increases. In terms of her dyspnea since the procedure, we reviewed her pulmonary imaging including the lung shunt study and Bremsstrahlung scan, which did not show deposition of the yttrium outside the target area. The hepatopulmonary shunt was 3.1%, indicating the Y90 SIRT is unlikely to be the etiology of her dyspnea complaint.    ANDREW Spencer with Neymar Juarez MD  Interventional Radiology   [address] ([id number])  [place] [address]  [phone]

## 2017-09-15 ENCOUNTER — NON-PROVIDER VISIT (OUTPATIENT)
Dept: PULMONOLOGY | Facility: HOSPICE | Age: 77
End: 2017-09-15
Payer: MEDICARE

## 2017-09-15 ENCOUNTER — OFFICE VISIT (OUTPATIENT)
Dept: PULMONOLOGY | Facility: HOSPICE | Age: 77
End: 2017-09-15
Payer: MEDICARE

## 2017-09-15 VITALS
BODY MASS INDEX: 28.49 KG/M2 | HEIGHT: 65 IN | DIASTOLIC BLOOD PRESSURE: 84 MMHG | SYSTOLIC BLOOD PRESSURE: 132 MMHG | WEIGHT: 171 LBS | HEART RATE: 94 BPM | OXYGEN SATURATION: 97 %

## 2017-09-15 DIAGNOSIS — R06.00 DYSPNEA, UNSPECIFIED TYPE: ICD-10-CM

## 2017-09-15 DIAGNOSIS — R06.09 DOE (DYSPNEA ON EXERTION): ICD-10-CM

## 2017-09-15 PROCEDURE — 94726 PLETHYSMOGRAPHY LUNG VOLUMES: CPT | Performed by: INTERNAL MEDICINE

## 2017-09-15 PROCEDURE — 99204 OFFICE O/P NEW MOD 45 MIN: CPT | Mod: 25 | Performed by: INTERNAL MEDICINE

## 2017-09-15 PROCEDURE — 94060 EVALUATION OF WHEEZING: CPT | Performed by: INTERNAL MEDICINE

## 2017-09-15 PROCEDURE — 94729 DIFFUSING CAPACITY: CPT | Performed by: INTERNAL MEDICINE

## 2017-09-15 RX ORDER — FAMOTIDINE 40 MG/1
TABLET, FILM COATED ORAL
COMMUNITY
Start: 2017-07-14 | End: 2017-09-19

## 2017-09-15 RX ORDER — IBUPROFEN 200 MG
200 TABLET ORAL EVERY 6 HOURS PRN
COMMUNITY
End: 2017-09-19

## 2017-09-15 ASSESSMENT — PULMONARY FUNCTION TESTS
FEV1_PERCENT_PREDICTED: 105
FEV1/FVC_PERCENT_PREDICTED: 111
FEV1: 2.24
FEV1: 2.33
FEV1/FVC: 76
FVC_PERCENT_PREDICTED: 104
FVC: 2.96
FVC_PERCENT_PREDICTED: 99
FEV1_PERCENT_CHANGE: -4
FEV1/FVC_PERCENT_PREDICTED: 101
FEV1_PREDICTED: 2.12
FEV1_PERCENT_CHANGE: 4
FVC: 2.83
FEV1_PERCENT_PREDICTED: 110
FEV1/FVC_PERCENT_PREDICTED: 75
FEV1/FVC: 82.33
FVC_PREDICTED: 2.84
FEV1/FVC_PERCENT_CHANGE: -100

## 2017-09-15 NOTE — PROGRESS NOTES
Karlene Walters is a 77 y.o. female here for dyspnea on exertion.  Patient was referred by Dr. Hardy.    History of Present Illness:    The patient is a 77-year-old female comes in for evaluation of dyspnea on exertion. She has metastatic rectal cancer and has received chemotherapy and is currently on 5-FU. She is also received Y 90 radiation. She has a history of pulmonary emboli 2 years ago and is on chronic anticoagulation with Xarelto. She had a repeat CT scan of the chest done on 24 August which shows no evidence of pulmonary emboli although there is a possible old thrombus in the proximal right upper lobe but unchanged compared to 2015. There is attenuation of the left lower lobe pulmonary arteries and there is hyperexpanded lungs. There is scattered atelectasis/mild pneumonitis especially in the left lower lobe. Pulmonary function tests were done today which shows normal spirometry and there was not a significant postbronchodilator response. There was evidence of mild air trapping in the diffusion was mildly reduced. She is a never smoker. She has no history of asthma or COPD. She states that she is fine when she is sitting but when she starts to do any Activity she just feels short of breath and with no exertional stamina. She denies any cough or congestion or sputum production. She's had no chest pains or pleurisy.    Constitutional:  Negative for fever, chills, sweats, and fatigue.  Eyes:  Negative for eye pain and visual changes.  HENT:  Negative for tinnitus and hoarse voice.  Cardiovascular:  Negative for chest pain, leg swelling, syncope and orthopnea.  Respiratory:  See HPI for pertinent negatives  Sleep:  Negative for somnolence, loud snoring, sleep disturbance due to breathing, insomnia.  Gastrointestinal:  Negative for dysphagia, nausea and abdominal pain.  Heme/lymph:  Denies easy bruising, blood clots.  Musculoskeletal:  Negative for arthralgias, sore muscles and back pain.  Skin:  Negative  for rash and color change.  Neurological:  Negative for headaches, lightheadedness and weakness.  Psychiatric:  Denies depression.    Current Outpatient Prescriptions   Medication Sig Dispense Refill   • famotidine (PEPCID) 40 MG Tab      • ibuprofen (MOTRIN) 200 MG Tab Take 200 mg by mouth every 6 hours as needed.     • ranitidine (ZANTAC) 150 MG Tab TAKE 1 TABLET 2 TIMES A DAY. 60 Tab 3   • XARELTO 20 MG Tab tablet TAKE 1 TABLET WITH DINNER. 30 Tab 6   • oxycodone immediate-release (ROXICODONE) 5 MG Tab Take 1 Tab by mouth every four hours as needed for Severe Pain. 30 Tab 0   • furosemide (LASIX) 20 MG Tab Take 1 Tab by mouth every day. PRN EDEMA 30 Tab 3   • potassium chloride ER (KLOR-CON) 10 MEQ tablet Take 1 Tab by mouth every day. 30 Tab 3   • lidocaine-prilocaine (EMLA) 2.5-2.5 % Cream APPLY A THICK FILM OVER THE PORT ACCESS SITE PRIOR TO ACCESS 30 g 2   • loperamide (IMODIUM) 1 MG/5ML Liquid Take 2 mg by mouth 4 times a day as needed for Diarrhea.     • ondansetron (ZOFRAN) 4 MG Tab tablet      • cyanocobalamin (VITAMIN B-12) 500 MCG Tab Take 500 mcg by mouth every day.     • Cholecalciferol (VITAMIN D3) 400 UNITS CAPS Take 1 Cap by mouth every day.     • loratadine (CLARITIN) 10 MG TABS Take 10 mg by mouth every day. Indications: Hayfever     • Esomeprazole Magnesium 10 MG Pack Take  by mouth.     • Multiple Vitamins-Minerals (CENTRUM SILVER PO) Take  by mouth.       Current Facility-Administered Medications   Medication Dose Route Frequency Provider Last Rate Last Dose   • heparin pf injection 500 Units  500 Units Intracatheter PRN Ella De, A.P.N.   500 Units at 08/22/17 0856       Social History   Substance Use Topics   • Smoking status: Never Smoker   • Smokeless tobacco: Never Used   • Alcohol use No      Comment: rare       Past Medical History:   Diagnosis Date   • Pneumonia 05/2017    tx'd with antibx   • Chemotherapy-induced neutropenia (CMS-HCC) 9/28/2016   • Routine general medical  examination at a health care facility 3/14/2016    3/14/16   • Renal insufficiency 3/14/2016   • Lightheadedness 9/17/2015   • Blood clotting disorder (CMS-HCC) 08/2015    bilat PE    • Cancer (CMS-HCC) 09/2012    rectal cancer,  Liver CA-2015   • Blood transfusion 1957   • Arrhythmia    • CATARACT     removed b/l   • Chickenpox    • Colon cancer (CMS-HCC)    • Dental disorder     dentures UPPERS AND LOWERS   • Fall    • Romansh measles    • Hemorrhagic disorder (CMS-HCC)     on Xarelto    • Hypercholesteremia    • Hypertension     no meds currently    • Ileostomy in place (CMS-HCC)    • Indigestion    • Influenza    • Mumps    • Obstruction     ileostomy   • Other specified symptom associated with female genital organs     HYSTERECTOMY 1993   • Pulmonary embolism (CMS-HCC)    • 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)    • Seasonal allergies    • Tonsillitis        Past Surgical History:   Procedure Laterality Date   • COLONOSCOPY WITH BIOPSY  8/23/2015    Procedure: COLONOSCOPY WITH BIOPSY;  Surgeon: Wilbur Glasgow M.D.;  Location: ENDOSCOPY [facility];  Service:    • HEPATIC ABLATION LAPAROSCOPIC  7/6/2015    Procedure: HEPATIC ABLATION LAPAROSCOPIC.;  Surgeon: Kit West M.D.;  Location: SURGERY [facility];  Service:    • CATH PLACEMENT Left 7/6/2015    Procedure: CATH PLACEMENT CEPHALIC POWERPORT;  Surgeon: Kit West M.D.;  Location: SURGERY [facility];  Service:    • FISTULA IN ANO REPAIR  1/28/2015    Performed by Kolton Montgomery M.D. at SURGERY [facility]   • PERINEAL PROCEDURE  1/28/2015    Performed by Kolton Montgomery M.D. at [facility]   • FISTULA IN ANO REPAIR  10/15/2014    Performed by Kolton Montgomery M.D. at [facility]   • PERINEAL PROCEDURE  10/15/2014    Performed by Kolton Montgomery M.D. at [facility]   • IRRIGATION & DEBRIDEMENT GENERAL  2/19/2014    Performed by Kolton Montgomery M.D. at [facility]    • FLAP GRAFT  2/19/2014    Performed by Kolton Montgomery M.D. at SURGERY [facility]   • PERINEAL PROCEDURE  12/18/2013    Performed by Kolton Montgomery M.D. at [facility]   • MYOCUTANEOUS FLAP  12/18/2013    Performed by Kolton Montgomery M.D. at [facility]   • IRRIGATION & DEBRIDEMENT GENERAL  10/23/2013    Performed by Kolton Montgomery M.D. at [facility]   • FISTULA IN ANO REPAIR  9/4/2013    Performed by Kolton Montgomery M.D. at [facility]   • FLAP ROTATION  9/4/2013    Performed by Kolton Montgomery M.D. at [facility]   • RECOVERY  8/26/2013    Performed by Cath-Recovery Surgery at [facility] SAME DAY [facility]   • PROCTOSCOPY  7/17/2013    Performed by Kolton Montgomery M.D. at [facility]   • BIOPSY GENERAL  7/17/2013    Performed by Kolton Montgomery M.D. at [facility]   • SIGMOIDOSCOPY  2/27/2013    Performed by Kolton Montgomery M.D. at [facility]   • FISTULA IN ANO REPAIR  2/27/2013    Performed by Kolton Montgomery M.D. at [facility]   • LAPAROSCOPY  10/8/2012    Performed by Kolton Montgomery M.D. at [facility]   • ILEO LOOP DIVERSION  10/8/2012    Performed by Kolton Montgomery M.D. at [facility]   • COLECTOMY  9/26/2012    Performed by Kolton Montgomery M.D. at [facility]   • COLONOSCOPY FLEX W/ENDO US  9/20/2012    Performed by Harshil Bolton M.D. at [facility]   • OTHER  2009    left eye torn retina repair   • CATARACT EXTRACTION WITH IOL  2004    bilateral   • ABDOMINAL HYSTERECTOMY TOTAL  1983   • CYST EXCISION  1972    ovarian   • COLON RESECTION     • EYE SURGERY      cataracts   • HYSTERECTOMY LAPAROSCOPY     • PB REMV 2ND CATARACT,CORN-SCLER SECTN     • TONSILLECTOMY         Allergies:  Codeine; Oxaliplatin; Pcn [penicillins]; Sulfa drugs; and Tape    Family History   Problem Relation Age of Onset   • Heart Disease Mother    • Heart Failure Mother    • Hypertension  "Mother    • Hyperlipidemia Mother    • Cancer Mother    • Cancer Maternal Aunt 60     breast ca   • Lung Disease Brother      COPD/Emphysema/Smoker   • Cancer Brother      prostate cancer   • Alcohol/Drug Brother      Alcohol   • Kidney Disease Father      renal failure       Physical Examination    Vitals:    09/15/17 1037   Height: 1.651 m (5' 5\")   Weight: 77.6 kg (171 lb)   Weight % change since last entry.: 0 %   BP: 132/84   Pulse: 94   BMI (Calculated): 28.46       Physical Exam:  Constitutional:  Well developed and well nourished.  Head:  Normocephalic and atraumatic.  Nose:  Nose normal.  Mouth/Throat:  Oropharynx is clear and moist, no lesions.    Eyes:  Conjunctivae and EOM are normal.  Pupils are equal, round, and reactive to light.  Neck:  Normal range of motion.  Supple.  No JVD. No tracheal deviation.  No thyromegally  Cardiovascular:  Normal rate, regular rhythm, normal heart sounds and intact distal pulses.  Pulmonary/Chest:  No accessory muscle use.  No wheezing, rales or rhonchi.  No dullness to percussion, tenderness or deformity.  Abdominal:  Soft.  No ascites.  No Hepatosplenomegally.  Non tender.  Musculoskeletal.  Normal range of motion.  No muscular atrophy.  Lymphadenopathy:  No cervical or supraclavicular adenopathy  Neurological:  Alert and oriented.  Cranial nerves intact.  No focal deficits  Skin:  No rashes or ulcers.  Psyciatric:  Normal mood and affect.    Assessment and Plan:  1. LOPEZ (dyspnea on exertion)  The patient has dyspnea on exertion. The CT scan of the chest is mostly unremarkable. Pulmonary function tests show evidence of air trapping and a mild diffusion defect. Must consider possibility of occult interstitial lung disease or she may have some COPD. Pulmonary hypertension is also a possibility. I ordered a high-resolution CT scan of the chest and echo and we will check her oxygen saturations with ambulation today. As part of the echo we will do a contrast bubble study to " rule out possibility of shunt.  - ECHOCARDIOGRAM COMP W/O CONT; Future  - CT-CHEST, HIGH RESOLUTION LUNG; Future  - AMB MULTIPLE OXIMETRY; Future          Followup Return in about 4 weeks (around 10/13/2017) for follow up visit with Luis Warren MD.

## 2017-09-15 NOTE — PATIENT INSTRUCTIONS
1. We have scheduled an echocardiogram  2. We have scheduled a high-resolution CT scan of the chest  3. We will check oxygen saturations with ambulation  4. Recommend follow-up in 4 weeks

## 2017-09-15 NOTE — PROCEDURES
"Good patient effort & cooperation.  The results of this test meet the ATS standards for acceptability and repeatability.  A bronchodilator of Ventolin HFA- 2puffs via spacer was administered. The results of the DLCO test appear to be valid, although the ATS warning for \"Inspritory Volume less than 85%\" had come up on all maneuvers.    1.  Spirometry shows normal vital capacity and air flows and there was not a significant improvement after a bronchodilator. Mid flows improved by 19%.  2.  Lung volumes show mild air trapping  3.  Diffusion capacity is mildly reduced  4.  Flow volume loops are within normal limits    Overall Impression: Normal spirometry and lung volumes however there is a suggestion of some airflow obstruction with a reduction in the mid flows that improves significantly postbronchodilator and mild air trapping was observed. The diffusion is mildly reduced. A reduced diffusion capacity can be associated with COPD, interstitial lung disease, pulmonary vascular disease, anemia and increased carboxyhemoglobin.    "

## 2017-09-19 ENCOUNTER — OUTPATIENT INFUSION SERVICES (OUTPATIENT)
Dept: ONCOLOGY | Facility: MEDICAL CENTER | Age: 77
End: 2017-09-19
Attending: NURSE PRACTITIONER
Payer: MEDICARE

## 2017-09-19 VITALS
SYSTOLIC BLOOD PRESSURE: 119 MMHG | WEIGHT: 167.55 LBS | OXYGEN SATURATION: 98 % | BODY MASS INDEX: 27.92 KG/M2 | HEART RATE: 108 BPM | DIASTOLIC BLOOD PRESSURE: 83 MMHG | RESPIRATION RATE: 18 BRPM | TEMPERATURE: 97.5 F | HEIGHT: 65 IN

## 2017-09-19 DIAGNOSIS — R94.4 DECREASED GFR: ICD-10-CM

## 2017-09-19 DIAGNOSIS — C20 RECTAL CANCER (HCC): ICD-10-CM

## 2017-09-19 LAB
ALBUMIN SERPL BCP-MCNC: 3.4 G/DL (ref 3.2–4.9)
ALBUMIN/GLOB SERPL: 1.2 G/DL
ALP SERPL-CCNC: 168 U/L (ref 30–99)
ALT SERPL-CCNC: 24 U/L (ref 2–50)
ANION GAP SERPL CALC-SCNC: 7 MMOL/L (ref 0–11.9)
ANISOCYTOSIS BLD QL SMEAR: ABNORMAL
AST SERPL-CCNC: 36 U/L (ref 12–45)
BASOPHILS # BLD AUTO: 1.8 % (ref 0–1.8)
BASOPHILS # BLD: 0.05 K/UL (ref 0–0.12)
BILIRUB SERPL-MCNC: 0.8 MG/DL (ref 0.1–1.5)
BUN SERPL-MCNC: 21 MG/DL (ref 8–22)
CALCIUM SERPL-MCNC: 9.3 MG/DL (ref 8.5–10.5)
CEA SERPL-MCNC: 1.4 NG/ML (ref 0–3)
CHLORIDE SERPL-SCNC: 108 MMOL/L (ref 96–112)
CO2 SERPL-SCNC: 21 MMOL/L (ref 20–33)
CREAT SERPL-MCNC: 1.11 MG/DL (ref 0.5–1.4)
EOSINOPHIL # BLD AUTO: 0.09 K/UL (ref 0–0.51)
EOSINOPHIL NFR BLD: 3.5 % (ref 0–6.9)
ERYTHROCYTE [DISTWIDTH] IN BLOOD BY AUTOMATED COUNT: 66.4 FL (ref 35.9–50)
GFR SERPL CREATININE-BSD FRML MDRD: 48 ML/MIN/1.73 M 2
GLOBULIN SER CALC-MCNC: 2.8 G/DL (ref 1.9–3.5)
GLUCOSE SERPL-MCNC: 129 MG/DL (ref 65–99)
HCT VFR BLD AUTO: 37.4 % (ref 37–47)
HGB BLD-MCNC: 12.4 G/DL (ref 12–16)
LYMPHOCYTES # BLD AUTO: 0.48 K/UL (ref 1–4.8)
LYMPHOCYTES NFR BLD: 18.6 % (ref 22–41)
MACROCYTES BLD QL SMEAR: ABNORMAL
MANUAL DIFF BLD: NORMAL
MCH RBC QN AUTO: 32.2 PG (ref 27–33)
MCHC RBC AUTO-ENTMCNC: 33.2 G/DL (ref 33.6–35)
MCV RBC AUTO: 97.1 FL (ref 81.4–97.8)
MICROCYTES BLD QL SMEAR: ABNORMAL
MONOCYTES # BLD AUTO: 0.35 K/UL (ref 0–0.85)
MONOCYTES NFR BLD AUTO: 13.3 % (ref 0–13.4)
MORPHOLOGY BLD-IMP: NORMAL
NEUTROPHILS # BLD AUTO: 1.63 K/UL (ref 2–7.15)
NEUTROPHILS NFR BLD: 62.8 % (ref 44–72)
NRBC # BLD AUTO: 0 K/UL
NRBC BLD AUTO-RTO: 0 /100 WBC
PLATELET # BLD AUTO: 135 K/UL (ref 164–446)
PLATELET BLD QL SMEAR: NORMAL
PMV BLD AUTO: 10.6 FL (ref 9–12.9)
POTASSIUM SERPL-SCNC: 3.7 MMOL/L (ref 3.6–5.5)
PROT SERPL-MCNC: 6.2 G/DL (ref 6–8.2)
RBC # BLD AUTO: 3.85 M/UL (ref 4.2–5.4)
RBC BLD AUTO: PRESENT
SODIUM SERPL-SCNC: 136 MMOL/L (ref 135–145)
VARIANT LYMPHS BLD QL SMEAR: NORMAL
WBC # BLD AUTO: 2.6 K/UL (ref 4.8–10.8)

## 2017-09-19 PROCEDURE — 85027 COMPLETE CBC AUTOMATED: CPT

## 2017-09-19 PROCEDURE — 85007 BL SMEAR W/DIFF WBC COUNT: CPT

## 2017-09-19 PROCEDURE — 700111 HCHG RX REV CODE 636 W/ 250 OVERRIDE (IP): Performed by: INTERNAL MEDICINE

## 2017-09-19 PROCEDURE — 96367 TX/PROPH/DG ADDL SEQ IV INF: CPT

## 2017-09-19 PROCEDURE — G0498 CHEMO EXTEND IV INFUS W/PUMP: HCPCS

## 2017-09-19 PROCEDURE — 96366 THER/PROPH/DIAG IV INF ADDON: CPT

## 2017-09-19 PROCEDURE — 96375 TX/PRO/DX INJ NEW DRUG ADDON: CPT

## 2017-09-19 PROCEDURE — A4212 NON CORING NEEDLE OR STYLET: HCPCS

## 2017-09-19 PROCEDURE — 82378 CARCINOEMBRYONIC ANTIGEN: CPT

## 2017-09-19 PROCEDURE — 96409 CHEMO IV PUSH SNGL DRUG: CPT

## 2017-09-19 PROCEDURE — 700105 HCHG RX REV CODE 258: Performed by: INTERNAL MEDICINE

## 2017-09-19 PROCEDURE — 80053 COMPREHEN METABOLIC PANEL: CPT

## 2017-09-19 RX ORDER — ONDANSETRON 8 MG/1
8 TABLET, ORALLY DISINTEGRATING ORAL
Status: CANCELLED | OUTPATIENT
Start: 2017-09-19

## 2017-09-19 RX ORDER — DEXTROSE MONOHYDRATE 50 MG/ML
INJECTION, SOLUTION INTRAVENOUS CONTINUOUS
Status: CANCELLED | OUTPATIENT
Start: 2017-09-19

## 2017-09-19 RX ORDER — DEXAMETHASONE SODIUM PHOSPHATE 4 MG/ML
8 INJECTION, SOLUTION INTRA-ARTICULAR; INTRALESIONAL; INTRAMUSCULAR; INTRAVENOUS; SOFT TISSUE ONCE
Status: CANCELLED | OUTPATIENT
Start: 2017-09-19 | End: 2017-09-19

## 2017-09-19 RX ORDER — LIDOCAINE HYDROCHLORIDE 10 MG/ML
10 INJECTION, SOLUTION INFILTRATION; PERINEURAL ONCE
Status: CANCELLED | OUTPATIENT
Start: 2017-09-19 | End: 2017-09-19

## 2017-09-19 RX ORDER — DEXAMETHASONE SODIUM PHOSPHATE 4 MG/ML
8 INJECTION, SOLUTION INTRA-ARTICULAR; INTRALESIONAL; INTRAMUSCULAR; INTRAVENOUS; SOFT TISSUE ONCE
Status: COMPLETED | OUTPATIENT
Start: 2017-09-19 | End: 2017-09-19

## 2017-09-19 RX ORDER — PROCHLORPERAZINE MALEATE 10 MG
10 TABLET ORAL EVERY 6 HOURS PRN
Status: CANCELLED | OUTPATIENT
Start: 2017-09-19

## 2017-09-19 RX ORDER — ONDANSETRON 2 MG/ML
4 INJECTION INTRAMUSCULAR; INTRAVENOUS
Status: CANCELLED | OUTPATIENT
Start: 2017-09-19

## 2017-09-19 RX ADMIN — FLUOROURACIL 3590 MG: 50 INJECTION, SOLUTION INTRAVENOUS at 14:53

## 2017-09-19 RX ADMIN — FLUOROURACIL 600 MG: 50 INJECTION, SOLUTION INTRAVENOUS at 13:51

## 2017-09-19 RX ADMIN — LEUCOVORIN CALCIUM 748 MG: 350 INJECTION, POWDER, LYOPHILIZED, FOR SOLUTION INTRAMUSCULAR; INTRAVENOUS at 12:40

## 2017-09-19 RX ADMIN — DEXAMETHASONE SODIUM PHOSPHATE 8 MG: 4 INJECTION, SOLUTION INTRAMUSCULAR; INTRAVENOUS at 12:08

## 2017-09-19 ASSESSMENT — PAIN SCALES - GENERAL: PAINLEVEL: 2=MINIMAL-SLIGHT

## 2017-09-19 NOTE — PROGRESS NOTES
Chemotherapy Verification - SECONDARY RN       Height = 166 cm  Weight = 76 kg  BSA = 1.87 m2       Medication:  5FU  Dose: 320 mg/m2 dr  Calculated Dose: 599 mg                             (In mg/m2, AUC, mg/kg)     Medication: Home infusion 5FU  Dose: 1920 mg/m2  Calculated Dose: 3590.4 mg                             (In mg/m2, AUC, mg/kg)      I confirm that this process was performed independently.

## 2017-09-19 NOTE — PROGRESS NOTES
"Pharmacy Chemotherapy Verification    Patient Name: Karlene Walters        Dx: Colon CA  Cycle:  44 Previous treatment: C43 = 9/5/17     Protocol: 5 FU/Leucovorin    Leucovorin 400 mg/m² IV over 2 hours on Day 1, followed by  Fluorouracil 400 mg/m²  IVP on Day 1, followed by    - Dose reduced by 20% to 320 mg/m² starting with Cycle 18 ([place] #11) on 7/5/16 due to neutropenia    Fluorouracil 1200 mg/m²  IV continuous infusion daily on Days 1 and 2 (2400 mg/m² IV over 46-48 hours) -    - Infusion Dose reduced by 20% starting with Cycle 18 ([place] #11) on 7/5/16 due to neutropenia   - 20% reduction (1920 mg/m²) to be continued starting C28 per Alsop APRN   14 day cycles for 12 cycles (adjuvant) or until disease progression or unacceptable toxicity    NCCN Guidelines for Colon Cancer. V.2.2015.  Jay T, et al. Eur J Cancer.1999;35(9):1343-7.          Allergies: Codeine; Oxaliplatin; Pcn [penicillins]; Sulfa drugs; and Tape  /83   Pulse (!) 108   Temp 36.4 °C (97.5 °F)   Resp 18   Ht 1.66 m (5' 5.35\")   Wt 76 kg (167 lb 8.8 oz)   SpO2 98%   BMI 27.58 kg/m²    Body surface area is 1.87 meters squared.     Labs 9/19/17  ANC ~1630  Plt = 135k Hgb = 12.4   SCr = 1.11 mg/dL CrCl = 50.6 mL/min  AST/ALT/AP = 36/24/168  TBili = 0.8     Leucovorin (Wellcovorin) 400 mg/m² x 1.87 m² = 748 mg   <5% difference, OK to treat with final written dose = 748 mg IV    Fluorouracil (5-FU) 320 mg/m² x 1.87 m² = 598.4 mg   <5% difference, OK to treat with final written dose = 600 mg IV    Fluorouracil (5-FU) 1920 mg/m²/dose x 1.87 m² = 3590.4 mg   <5% difference, OK to treat with final written dose = 3590 mg CIVI over 46 hours via CADD pump (1.6 mL/hr)    Lora Hernandez, PharmD                 "

## 2017-09-19 NOTE — PROGRESS NOTES
Pt arrives ambulatory to IS accompanied by spouse.  She is here for chemo and CAD pump hook up.  Port accessed in a sterile fashion with low profile non coring hernandez needle.  Blood drawn as ordered and results reveiwed.  All premeds and chemo infused as ordered, pt tolerated well, no evidence of adverse effects noted or expressed.  5FU pump hooked up to pt, second RN verified settings.  Spill kit given to pt at an earlier date.  Reviewed home care with pump and when and who to call for malfunction etc.  Pt comfortable with pump and home care plans.  Pt dc'd to care of spouse in no apparent distress.  Returns on 9/21 for disconnect, appt confirmed.

## 2017-09-19 NOTE — PROGRESS NOTES
Chemotherapy Verification - PRIMARY RN      Height = 166cm  Weight = 76kg  BSA = 1.8m2       Medication: 5fu  Dose: 320mg/m2  Calculated Dose: 599mg                             (In mg/m2, AUC, mg/kg)     Medication: 5fu  Dose: 1920mg/m2  Calculated Dose: 3456mg    I confirm this process was performed independently with the BSA and all final chemotherapy dosing calculations congruent.  Any discrepancies of 5% or greater have been addressed with the chemotherapy pharmacist. The resolution of the discrepancy has been documented in the EPIC progress notes.

## 2017-09-19 NOTE — PROGRESS NOTES
"Pharmacy Chemotherapy Verification Note:    Patient Name: Karlene Walters      Dx: Colon CA      Protocol: 5-FU+Leucovorin        *Dosing Reference*  Leucovorin 400 mg/m² IV over 2 hours on Day 1, followed by  Fluorouracil 400 mg/m²  IVP on Day 1, followed by    - Dose reduced by 20% to 320 mg/m² starting with Cycle 18 ([place] #11) on 7/5/16 due to neutropenia    Fluorouracil 1200 mg/m²  IV continuous infusion daily on Days 1 and 2 (2400 mg/m² IV over 46-48 hours) -    - Infusion Dose reduced by 20% starting with Cycle 18 ([place] #11) on 7/5/16 due to neutropenia   -20% reduction (1920 mg/m2) to be continued starting C28 per C Alsop APRN   14 day cycles for 12 cycles (adjuvant) or until disease progression or unacceptable toxicity    NCCN Guidelines for Colon Cancer. V.2.2015.  Jay T, et al. Eur J Cancer.1999;35(9):1343-7.      Allergies:  Codeine; Oxaliplatin; Pcn; Sulfa drugs; and Tape     /83   Pulse (!) 108   Temp 36.4 °C (97.5 °F)   Resp 18   Ht 1.66 m (5' 5.35\")   Wt 76 kg (167 lb 8.8 oz)   SpO2 98%   BMI 27.58 kg/m²  Body surface area is 1.87 meters squared.  ANC~ 1630  Plt = 135 k    SCr = 1.11 mg/dL CrCl = 51 mL/min  LFT = WNL  TBili = 0.8     Drug Order   (Drug name, dose, route, IV Fluid & volume, frequency, number of doses) Cycle: 44       Previous treatment: C43 = 9/5/17     Medication = Leucovorin (Wellcovorin)  Base Dose = 400 mg/m²  Calc Dose: Base Dose x 1.87 m² = 748 mg  Final Dose = 748 mg  Route = IV  Fluid & Volume = D5W 250 mL  Admin Duration = Over 2 hours   Day 1 only       <5% difference, ok to treat with final written dose   Medication = Fluorouracil (5-FU)   Base Dose = 320 mg/m²  Calc Dose: Base Dose x 1.87 m² = 598 mg  Final Dose = 600 mg  Route = IVP  Fluid & Volume = 12 mL in syringe  Conc = 50 mg/mL  Admin Duration = Over 2-5 minutes   Day 1 only        <5% difference, ok to treat with final written dose   Medication = Fluorouracil (5-FU)   Base Dose = 1920 " mg/m²/dose  Calc Dose:Base Dose x 1.87 m² = 3590 mg  Final Dose = 3590 mg/dose  Route = CIVI  Fluid & Volume = CADD pump 71.8 mL (+ 3 mL overfill = 74.8 mL)  Conc = 50 mg/mL  Admin Duration = Over 46 hours  Rate = 1.6 mL/hour          <5% difference, ok to treat with final written dose     By my signature below, I confirm this process was performed independently with the BSA and all final chemotherapy dosing calculations congruent. I have reviewed the above chemotherapy order and that my calculation of the final dose and BSA (when applicable) corroborate those calculations of the  pharmacist.     Rudy Arthur, PharmD, BCPS

## 2017-09-21 ENCOUNTER — OUTPATIENT INFUSION SERVICES (OUTPATIENT)
Dept: ONCOLOGY | Facility: MEDICAL CENTER | Age: 77
End: 2017-09-21
Attending: NURSE PRACTITIONER
Payer: MEDICARE

## 2017-09-21 VITALS
HEIGHT: 65 IN | WEIGHT: 170.19 LBS | RESPIRATION RATE: 18 BRPM | HEART RATE: 85 BPM | BODY MASS INDEX: 28.36 KG/M2 | DIASTOLIC BLOOD PRESSURE: 72 MMHG | TEMPERATURE: 97.7 F | OXYGEN SATURATION: 94 % | SYSTOLIC BLOOD PRESSURE: 122 MMHG

## 2017-09-21 DIAGNOSIS — C20 RECTAL CANCER (HCC): ICD-10-CM

## 2017-09-21 DIAGNOSIS — R94.4 DECREASED GFR: ICD-10-CM

## 2017-09-21 PROCEDURE — 96523 IRRIG DRUG DELIVERY DEVICE: CPT

## 2017-09-21 PROCEDURE — 700111 HCHG RX REV CODE 636 W/ 250 OVERRIDE (IP)

## 2017-09-21 RX ADMIN — HEPARIN 500 UNITS: 100 SYRINGE at 15:02

## 2017-09-21 NOTE — PROGRESS NOTES
Pt arrives ambulatory to IS accompanied by self.  She is here for port de access following completion of 5FU pump.  Pump tubing removed, port flushed per heparin protocol, NCN removed intact.  Pressure drsg to site.  Pt denies any changes in overall assessment.  Pt dc'd to care of self in no apparent distress.  Future appt confirmed.  Empty chemo bag disposed of to chemo bin.  Pump wiped and placed in carry bag and stored in pump storage drawer.

## 2017-10-03 ENCOUNTER — OFFICE VISIT (OUTPATIENT)
Dept: HEMATOLOGY ONCOLOGY | Facility: MEDICAL CENTER | Age: 77
End: 2017-10-03
Payer: MEDICARE

## 2017-10-03 ENCOUNTER — OUTPATIENT INFUSION SERVICES (OUTPATIENT)
Dept: ONCOLOGY | Facility: MEDICAL CENTER | Age: 77
End: 2017-10-03
Attending: NURSE PRACTITIONER
Payer: MEDICARE

## 2017-10-03 ENCOUNTER — OUTPATIENT INFUSION SERVICES (OUTPATIENT)
Dept: ONCOLOGY | Facility: MEDICAL CENTER | Age: 77
End: 2017-10-03
Attending: INTERNAL MEDICINE
Payer: MEDICARE

## 2017-10-03 VITALS
RESPIRATION RATE: 16 BRPM | OXYGEN SATURATION: 99 % | DIASTOLIC BLOOD PRESSURE: 62 MMHG | SYSTOLIC BLOOD PRESSURE: 134 MMHG | HEART RATE: 81 BPM | TEMPERATURE: 97.8 F | HEIGHT: 65 IN | BODY MASS INDEX: 28.39 KG/M2 | WEIGHT: 170.42 LBS

## 2017-10-03 VITALS
SYSTOLIC BLOOD PRESSURE: 120 MMHG | HEIGHT: 65 IN | DIASTOLIC BLOOD PRESSURE: 67 MMHG | TEMPERATURE: 97.2 F | OXYGEN SATURATION: 99 % | HEART RATE: 82 BPM | BODY MASS INDEX: 27.33 KG/M2 | RESPIRATION RATE: 18 BRPM | WEIGHT: 164.02 LBS

## 2017-10-03 VITALS
HEIGHT: 65 IN | WEIGHT: 164.02 LBS | SYSTOLIC BLOOD PRESSURE: 120 MMHG | RESPIRATION RATE: 18 BRPM | DIASTOLIC BLOOD PRESSURE: 67 MMHG | OXYGEN SATURATION: 99 % | TEMPERATURE: 97.2 F | BODY MASS INDEX: 27.33 KG/M2 | HEART RATE: 82 BPM

## 2017-10-03 DIAGNOSIS — C20 RECTAL CANCER (HCC): ICD-10-CM

## 2017-10-03 DIAGNOSIS — Z51.11 ENCOUNTER FOR ANTINEOPLASTIC CHEMOTHERAPY: ICD-10-CM

## 2017-10-03 DIAGNOSIS — Z86.711 HISTORY OF PULMONARY EMBOLISM: ICD-10-CM

## 2017-10-03 DIAGNOSIS — T45.1X5A CHEMOTHERAPY-INDUCED NEUTROPENIA (HCC): ICD-10-CM

## 2017-10-03 DIAGNOSIS — R94.4 DECREASED GFR: ICD-10-CM

## 2017-10-03 DIAGNOSIS — C78.7 RECTAL CANCER METASTASIZED TO LIVER (HCC): ICD-10-CM

## 2017-10-03 DIAGNOSIS — R06.02 SHORTNESS OF BREATH: ICD-10-CM

## 2017-10-03 DIAGNOSIS — C20 RECTAL CANCER METASTASIZED TO LIVER (HCC): ICD-10-CM

## 2017-10-03 DIAGNOSIS — D70.1 CHEMOTHERAPY-INDUCED NEUTROPENIA (HCC): ICD-10-CM

## 2017-10-03 LAB
ALBUMIN SERPL BCP-MCNC: 3.6 G/DL (ref 3.2–4.9)
ALBUMIN/GLOB SERPL: 1.2 G/DL
ALP SERPL-CCNC: 188 U/L (ref 30–99)
ALT SERPL-CCNC: 30 U/L (ref 2–50)
ANION GAP SERPL CALC-SCNC: 11 MMOL/L (ref 0–11.9)
ANISOCYTOSIS BLD QL SMEAR: ABNORMAL
AST SERPL-CCNC: 40 U/L (ref 12–45)
BASOPHILS # BLD AUTO: 1.8 % (ref 0–1.8)
BASOPHILS # BLD: 0.05 K/UL (ref 0–0.12)
BILIRUB SERPL-MCNC: 1.2 MG/DL (ref 0.1–1.5)
BUN SERPL-MCNC: 18 MG/DL (ref 8–22)
CALCIUM SERPL-MCNC: 9.8 MG/DL (ref 8.5–10.5)
CHLORIDE SERPL-SCNC: 106 MMOL/L (ref 96–112)
CO2 SERPL-SCNC: 23 MMOL/L (ref 20–33)
COMMENT 1642: NORMAL
CREAT SERPL-MCNC: 0.95 MG/DL (ref 0.5–1.4)
EOSINOPHIL # BLD AUTO: 0.14 K/UL (ref 0–0.51)
EOSINOPHIL NFR BLD: 4.9 % (ref 0–6.9)
ERYTHROCYTE [DISTWIDTH] IN BLOOD BY AUTOMATED COUNT: 68 FL (ref 35.9–50)
GFR SERPL CREATININE-BSD FRML MDRD: 57 ML/MIN/1.73 M 2
GLOBULIN SER CALC-MCNC: 2.9 G/DL (ref 1.9–3.5)
GLUCOSE SERPL-MCNC: 99 MG/DL (ref 65–99)
HCT VFR BLD AUTO: 38.8 % (ref 37–47)
HGB BLD-MCNC: 12.8 G/DL (ref 12–16)
IMM GRANULOCYTES # BLD AUTO: 0.01 K/UL (ref 0–0.11)
IMM GRANULOCYTES NFR BLD AUTO: 0.4 % (ref 0–0.9)
LYMPHOCYTES # BLD AUTO: 0.76 K/UL (ref 1–4.8)
LYMPHOCYTES NFR BLD: 26.9 % (ref 22–41)
MACROCYTES BLD QL SMEAR: ABNORMAL
MCH RBC QN AUTO: 32.5 PG (ref 27–33)
MCHC RBC AUTO-ENTMCNC: 33 G/DL (ref 33.6–35)
MCV RBC AUTO: 98.5 FL (ref 81.4–97.8)
MONOCYTES # BLD AUTO: 0.62 K/UL (ref 0–0.85)
MONOCYTES NFR BLD AUTO: 21.9 % (ref 0–13.4)
MORPHOLOGY BLD-IMP: NORMAL
NEUTROPHILS # BLD AUTO: 1.25 K/UL (ref 2–7.15)
NEUTROPHILS NFR BLD: 44.1 % (ref 44–72)
NRBC # BLD AUTO: 0 K/UL
NRBC BLD AUTO-RTO: 0 /100 WBC
PLATELET # BLD AUTO: 167 K/UL (ref 164–446)
PMV BLD AUTO: 10.3 FL (ref 9–12.9)
POTASSIUM SERPL-SCNC: 3.9 MMOL/L (ref 3.6–5.5)
PROT SERPL-MCNC: 6.5 G/DL (ref 6–8.2)
RBC # BLD AUTO: 3.94 M/UL (ref 4.2–5.4)
RBC BLD AUTO: PRESENT
SODIUM SERPL-SCNC: 140 MMOL/L (ref 135–145)
WBC # BLD AUTO: 2.8 K/UL (ref 4.8–10.8)

## 2017-10-03 PROCEDURE — 96375 TX/PRO/DX INJ NEW DRUG ADDON: CPT

## 2017-10-03 PROCEDURE — 85025 COMPLETE CBC W/AUTO DIFF WBC: CPT

## 2017-10-03 PROCEDURE — 96523 IRRIG DRUG DELIVERY DEVICE: CPT

## 2017-10-03 PROCEDURE — 80053 COMPREHEN METABOLIC PANEL: CPT

## 2017-10-03 PROCEDURE — 96409 CHEMO IV PUSH SNGL DRUG: CPT

## 2017-10-03 PROCEDURE — 700111 HCHG RX REV CODE 636 W/ 250 OVERRIDE (IP)

## 2017-10-03 PROCEDURE — 96367 TX/PROPH/DG ADDL SEQ IV INF: CPT

## 2017-10-03 PROCEDURE — A4212 NON CORING NEEDLE OR STYLET: HCPCS

## 2017-10-03 PROCEDURE — 700111 HCHG RX REV CODE 636 W/ 250 OVERRIDE (IP): Performed by: NURSE PRACTITIONER

## 2017-10-03 PROCEDURE — 99215 OFFICE O/P EST HI 40 MIN: CPT | Performed by: NURSE PRACTITIONER

## 2017-10-03 PROCEDURE — G0498 CHEMO EXTEND IV INFUS W/PUMP: HCPCS

## 2017-10-03 PROCEDURE — 700105 HCHG RX REV CODE 258: Performed by: NURSE PRACTITIONER

## 2017-10-03 RX ORDER — LIDOCAINE HYDROCHLORIDE 10 MG/ML
10 INJECTION, SOLUTION INFILTRATION; PERINEURAL ONCE
Status: CANCELLED | OUTPATIENT
Start: 2017-10-03 | End: 2017-10-03

## 2017-10-03 RX ORDER — DEXAMETHASONE SODIUM PHOSPHATE 4 MG/ML
8 INJECTION, SOLUTION INTRA-ARTICULAR; INTRALESIONAL; INTRAMUSCULAR; INTRAVENOUS; SOFT TISSUE ONCE
Status: CANCELLED | OUTPATIENT
Start: 2017-10-03 | End: 2017-10-03

## 2017-10-03 RX ORDER — PROCHLORPERAZINE MALEATE 10 MG
10 TABLET ORAL EVERY 6 HOURS PRN
Status: CANCELLED | OUTPATIENT
Start: 2017-10-03

## 2017-10-03 RX ORDER — DEXTROSE MONOHYDRATE 50 MG/ML
INJECTION, SOLUTION INTRAVENOUS CONTINUOUS
Status: CANCELLED | OUTPATIENT
Start: 2017-10-03

## 2017-10-03 RX ORDER — DEXAMETHASONE SODIUM PHOSPHATE 4 MG/ML
8 INJECTION, SOLUTION INTRA-ARTICULAR; INTRALESIONAL; INTRAMUSCULAR; INTRAVENOUS; SOFT TISSUE ONCE
Status: COMPLETED | OUTPATIENT
Start: 2017-10-03 | End: 2017-10-03

## 2017-10-03 RX ORDER — ONDANSETRON 8 MG/1
8 TABLET, ORALLY DISINTEGRATING ORAL
Status: CANCELLED | OUTPATIENT
Start: 2017-10-03

## 2017-10-03 RX ORDER — ONDANSETRON 2 MG/ML
4 INJECTION INTRAMUSCULAR; INTRAVENOUS
Status: CANCELLED | OUTPATIENT
Start: 2017-10-03

## 2017-10-03 RX ADMIN — DEXAMETHASONE SODIUM PHOSPHATE 8 MG: 4 INJECTION, SOLUTION INTRAMUSCULAR; INTRAVENOUS at 10:19

## 2017-10-03 RX ADMIN — FLUOROURACIL 3550 MG: 50 INJECTION, SOLUTION INTRAVENOUS at 13:55

## 2017-10-03 RX ADMIN — FLUOROURACIL 590 MG: 50 INJECTION, SOLUTION INTRAVENOUS at 13:45

## 2017-10-03 RX ADMIN — LEUCOVORIN CALCIUM 740 MG: 350 INJECTION, POWDER, LYOPHILIZED, FOR SOLUTION INTRAMUSCULAR; INTRAVENOUS at 11:31

## 2017-10-03 ASSESSMENT — ENCOUNTER SYMPTOMS
BRUISES/BLEEDS EASILY: 0
FEVER: 0
MYALGIAS: 0
DIARRHEA: 1
WEAKNESS: 0
COUGH: 0
CHILLS: 0
ABDOMINAL PAIN: 0
VOMITING: 0
HEADACHES: 0
NAUSEA: 0
TINGLING: 1
NERVOUS/ANXIOUS: 0

## 2017-10-03 ASSESSMENT — PAIN SCALES - GENERAL
PAINLEVEL: 1=MINIMAL PAIN
PAINLEVEL: 1=MINIMAL PAIN
PAINLEVEL: NO PAIN

## 2017-10-03 NOTE — PROGRESS NOTES
"Pharmacy Chemotherapy Verification Note:    Patient Name: Karlene Walters      Dx: Colon CA      Protocol: 5-FU+Leucovorin        *Dosing Reference*  Leucovorin 400 mg/m² IV over 2 hours on Day 1, followed by  Fluorouracil 400 mg/m²  IVP on Day 1, followed by    - Dose reduced by 20% to 320 mg/m² starting with Cycle 18 ([place] #11) on 7/5/16 due to neutropenia    Fluorouracil 1200 mg/m²  IV continuous infusion daily on Days 1 and 2 (2400 mg/m² IV over 46-48 hours) -    - Infusion Dose reduced by 20% starting with Cycle 18 ([place] #11) on 7/5/16 due to neutropenia   -20% reduction (1920 mg/m2) to be continued starting C28 per C Alsop APRN   14 day cycles for 12 cycles (adjuvant) or until disease progression or unacceptable toxicity    NCCN Guidelines for Colon Cancer. V.2.2015.  Jay T, et al. Eur J Cancer.1999;35(9):1343-7.      Allergies:  Codeine; Oxaliplatin; Pcn; Sulfa drugs; and Tape     /67   Pulse 82   Temp 36.2 °C (97.2 °F)   Resp 18   Ht 1.66 m (5' 5.35\")   Wt 74.4 kg (164 lb 0.4 oz)   SpO2 99%   BMI 27.00 kg/m²  Body surface area is 1.85 meters squared.    10/3/2017:  ANC~ 1250  Plt = 167 k    SCr = 0.95 mg/dL CrCl = 58 mL/min  LFT = 40/30/188  TBili = 1.2     Drug Order   (Drug name, dose, route, IV Fluid & volume, frequency, number of doses) Cycle: 45       Previous treatment: C44 = 9/19/2017     Medication = Leucovorin (Wellcovorin)  Base Dose = 400 mg/m²  Calc Dose: Base Dose x 1.85 m² = 740 mg  Final Dose = 740 mg  Route = IV  Fluid & Volume = D5W 250 mL  Admin Duration = Over 2 hours   Day 1 only       <5% difference, ok to treat with final written dose   Medication = Fluorouracil (5-FU)   Base Dose = 320 mg/m²  Calc Dose: Base Dose x 1.85 m² = 592 mg  Final Dose = 590 mg  Route = IVP  Fluid & Volume = 12 mL in syringe  Conc = 50 mg/mL  Admin Duration = Over 2-5 minutes   Day 1 only        <5% difference, ok to treat with final written dose   Medication = Fluorouracil (5-FU)   Base " Dose = 1920 mg/m²/dose  Calc Dose:Base Dose x 1.85 m² = 3552 mg  Final Dose = 3550 mg/dose  Route = CIVI  Fluid & Volume = CADD pump 71.8 mL (+ 3 mL overfill = 74.8 mL)  Conc = 50 mg/mL  Admin Duration = Over 46 hours  Rate = 1.6 mL/hour          <5% difference, ok to treat with final written dose     By my signature below, I confirm this process was performed independently with the BSA and all final chemotherapy dosing calculations congruent. I have reviewed the above chemotherapy order and that my calculation of the final dose and BSA (when applicable) corroborate those calculations of the  pharmacist.     ANA Villatoro, PharmD

## 2017-10-03 NOTE — PROGRESS NOTES
"Pharmacy Chemotherapy Verification    Patient Name: Karlene Walters        Dx: Colon CA  Cycle:  45 Previous treatment: C44 = 9/19/17     Protocol: 5 FU/Leucovorin    Leucovorin 400 mg/m² IV over 2 hours on Day 1, followed by  Fluorouracil 400 mg/m²  IVP on Day 1, followed by    - Dose reduced by 20% to 320 mg/m² starting with Cycle 18 ([place] #11) on 7/5/16 due to neutropenia    Fluorouracil 1200 mg/m²  IV continuous infusion daily on Days 1 and 2 (2400 mg/m² IV over 46-48 hours) -    - Infusion Dose reduced by 20% starting with Cycle 18 ([place] #11) on 7/5/16 due to neutropenia   - 20% reduction (1920 mg/m²) to be continued starting C28 per Alsop APRN   14 day cycles for 12 cycles (adjuvant) or until disease progression or unacceptable toxicity    NCCN Guidelines for Colon Cancer. V.2.2015.  Jay T, et al. Eur J Cancer.1999;35(9):1343-7.          Allergies: Codeine; Oxaliplatin; Pcn [penicillins]; Sulfa drugs; and Tape  /67   Pulse 82   Temp 36.2 °C (97.2 °F)   Resp 18   Ht 1.66 m (5' 5.35\")   Wt 74.4 kg (164 lb 0.4 oz)   SpO2 99%   BMI 27.00 kg/m²    Body surface area is 1.85 meters squared.      Labs 10/3/17  ANC ~1250 (OK to treat as ordered on 10/3/17)  Plt = 167k Hgb = 12.8  SCr = 0.95 mg/dL CrCl ~ 58 mL/min  AST/ALT/AP = 40/30/188  TBili = 1.2     Leucovorin (Wellcovorin) 400 mg/m² x 1.85 m² = 740 mg   <5% difference, OK to treat with final written dose = 740 mg IV    Fluorouracil (5-FU) 320 mg/m² x 1.85 m² = 592 mg   <5% difference, OK to treat with final written dose = 590 mg IV    Fluorouracil (5-FU) 1920 mg/m²/dose x 1.85 m² = 3552 mg   <5% difference, OK to treat with final written dose = 3550 mg CIVI    via CADD pump at 1.5 ml/hr x 46 hour    Norris Mitchell, PharmD    "

## 2017-10-03 NOTE — PROGRESS NOTES
Chemotherapy Verification - SECONDARY RN       Height = 166 cm  Weight = 74.4 kg  BSA = 1.85 m2       Medication: Fluorouracil push  Dose: 320 mg/m2  Calculated Dose: 592 mg                             (In mg/m2, AUC, mg/kg)     Medication: Fluorouracil pump  Dose: 1920 mg/m2  Calculated Dose: 3552 mg                             (In mg/m2, AUC, mg/kg)    I confirm that this process was performed independently.

## 2017-10-03 NOTE — PROGRESS NOTES
"Pt returns from ANDREW Paz appointment. OK to proceed with ANC 1250 today.  present. Pt is from [place], but stays in town during her treatments. SOB to be evaluated (no acute distress/no need for oxygen at this time) - MD aware of SOB with activity. Pt states she does get transient \"swollen/red hands\" a day or two after her treatments but is used to it. Pt reports no other side effects. Leucovorin infused over 2 hours (pt preference) followed by 5-FU push and 5-FU pump connect (71ml to be delivered - 73.6ml with overfill). Discharged home to self care. Next appointment verified.  "

## 2017-10-03 NOTE — PROGRESS NOTES
"Subjective:      Karlene Walters is a 77 y.o. female who presents with follow-up (prechemo)        HPI   Mrs. Walters presents for cycle 37 chemo evaluation for rectal cancer. She is accompanied by her .    She continues with FOLFOX without oxaliplatin (due to hx of anaphylaxis), she receives single agent 5-FU bolus and 48 hour infusion per pump with leucovorin, every 2 weeks. She continues to tolerate treatment very well. She denies nausea, vomiting, fever, chills. Diarrhea remains well controlled with Imodium as needed. Hull erythema persists for 1 to 2 days following infusion, well relieved with OTC moisturizer. CEA is improved to 1.4 on September 19, 2017, down from 2.8 in August.    Shortness of breath and dyspnea with exertion persist, she is now established with pulmonology and cardiology, workup continues.    Allergies   Allergen Reactions   • Codeine      \"gets drunk\"   • Oxaliplatin Anaphylaxis   • Pcn [Penicillins] Itching   • Sulfa Drugs Itching   • Tape Rash     PAPER TAPE OK     Current Outpatient Prescriptions on File Prior to Visit   Medication Sig Dispense Refill   • ranitidine (ZANTAC) 150 MG Tab TAKE 1 TABLET 2 TIMES A DAY. 60 Tab 3   • XARELTO 20 MG Tab tablet TAKE 1 TABLET WITH DINNER. 30 Tab 6   • lidocaine-prilocaine (EMLA) 2.5-2.5 % Cream APPLY A THICK FILM OVER THE PORT ACCESS SITE PRIOR TO ACCESS 30 g 2   • cyanocobalamin (VITAMIN B-12) 500 MCG Tab Take 500 mcg by mouth every day.     • Multiple Vitamins-Minerals (CENTRUM SILVER PO) Take  by mouth.     • Cholecalciferol (VITAMIN D3) 400 UNITS CAPS Take 1 Cap by mouth every day.     • loratadine (CLARITIN) 10 MG TABS Take 10 mg by mouth every day. Indications: Hayfever     • Esomeprazole Magnesium 10 MG Pack Take  by mouth.     • oxycodone immediate-release (ROXICODONE) 5 MG Tab Take 1 Tab by mouth every four hours as needed for Severe Pain. 30 Tab 0   • furosemide (LASIX) 20 MG Tab Take 1 Tab by mouth every day. PRN EDEMA 30 Tab 3 " "  • potassium chloride ER (KLOR-CON) 10 MEQ tablet Take 1 Tab by mouth every day. 30 Tab 3   • loperamide (IMODIUM) 1 MG/5ML Liquid Take 2 mg by mouth 4 times a day as needed for Diarrhea.     • ondansetron (ZOFRAN) 4 MG Tab tablet        Current Facility-Administered Medications on File Prior to Visit   Medication Dose Route Frequency Provider Last Rate Last Dose   • fluorouracil (ADRUCIL) 3,550 mg in CADD Cassette/Bag Chemo infusion  1,920 mg/m2 Intravenous Once Maria M Paz, A.P.R.N.   3,550 mg at 10/03/17 1355   • heparin pf injection 500 Units  500 Units Intracatheter PRN Ella De, A.P.N.   500 Units at 08/22/17 0856         Review of Systems   Constitutional: Negative for chills, fever and malaise/fatigue.   Respiratory: Negative for cough.    Cardiovascular: Positive for chest pain (routinely after chemo for 1-2 hours then subsides). Negative for leg swelling.   Gastrointestinal: Positive for diarrhea (intermittent per ostomy, controlled with Immodium PRN). Negative for abdominal pain, nausea and vomiting.   Genitourinary: Negative for dysuria and urgency.   Musculoskeletal: Negative for myalgias.   Skin: Positive for itching (alonso erythema and pruritis for 2-3 days following infusion - at bilateral hands) and rash (alonso erythema and pruritis for 2-3 days following infusion - at bilateral hands).   Neurological: Positive for tingling (intermittent at fingers and toes). Negative for weakness and headaches.   Endo/Heme/Allergies: Does not bruise/bleed easily.   Psychiatric/Behavioral: The patient is not nervous/anxious.           Objective:     /62   Pulse 81   Temp 36.6 °C (97.8 °F)   Resp 16   Ht 1.66 m (5' 5.35\")   Wt 77.3 kg (170 lb 6.7 oz)   SpO2 99%   Breastfeeding? No   BMI 28.06 kg/m²      Physical Exam   Constitutional: She is oriented to person, place, and time. She appears well-developed and well-nourished. No distress.   HENT:   Head: Normocephalic and atraumatic. "   Mouth/Throat: Oropharynx is clear and moist. No oropharyngeal exudate.   Alopecia of facial hair - thinning on scalp   Eyes: Conjunctivae and EOM are normal. Pupils are equal, round, and reactive to light. Right eye exhibits no discharge. Left eye exhibits no discharge. No scleral icterus.   Neck: Normal range of motion. Neck supple. No thyromegaly present.   Cardiovascular: Normal rate, regular rhythm, normal heart sounds and intact distal pulses.  Exam reveals no gallop and no friction rub.    No murmur heard.  Pulmonary/Chest: Effort normal and breath sounds normal. No respiratory distress. She has no wheezes.   SOB w/ exertion   Abdominal: Soft. Bowel sounds are normal. She exhibits no distension. There is no tenderness.   Ostomy without issue   Musculoskeletal: Normal range of motion. She exhibits no edema or tenderness.   Lymphadenopathy:        Head (right side): No submental, no submandibular, no tonsillar, no preauricular, no posterior auricular and no occipital adenopathy present.        Head (left side): No submental, no submandibular, no tonsillar, no preauricular, no posterior auricular and no occipital adenopathy present.     She has no cervical adenopathy.        Right cervical: No superficial cervical and no deep cervical adenopathy present.       Left cervical: No superficial cervical and no deep cervical adenopathy present.        Right: No supraclavicular adenopathy present.        Left: No supraclavicular adenopathy present.   Neurological: She is alert and oriented to person, place, and time.   Skin: Skin is warm and dry. No rash noted. She is not diaphoretic. There is erythema (at bilateral hands). No pallor.   Psychiatric: She has a normal mood and affect. Her behavior is normal.   Vitals reviewed.        Ct-abdomen Liver For Hepatic Mass (cirrhosis)    Result Date: 9/8/2017 9/8/2017 9:51 AM HISTORY/REASON FOR EXAM:  liver mass right lobe. Status post Y90. 3 month follow-up. Rectal cancer.  TECHNIQUE/EXAM DESCRIPTION:  Triphasic CT scan of the abdomen without and with contrast. Initial precontrast images were obtained from the diaphragmatic domes through the iliac crests using helical technique.  Following nonionic contrast administration in an intravenous bolus fashion, and postcontrast, thin-section helical scanning obtained from the diaphragmatic domes through the iliac crests.  Imaging was obtained in portal venous and arterial phases. 100 mL of Omnipaque 350 nonionic contrast was administered. Low dose optimization technique was utilized for this CT exam including automated exposure control and adjustment of the mA and/or kV according to patient size. COMPARISON: 5/24/2017. 3/16/2017 FINDINGS: Liver morphology: Heterogeneous appearance of the right hepatic lobe, likely secondary to treatment change. Focal liver findings: The low-attenuation area with peripheral enhancement in the hepatic dome is slightly bigger, measuring 2.5 x 2.8 x 4.5 cm, previously 2.2 x 2.5 x 4.6 cm. Hepatic arterial vasculature: Hepatic artery is patent and appears normal. Portal vein: The portal vein enhances normally and is patent. Splenic vein: The splenic vein is patent. Biliary system: Gallbladder is surgically absent. No bile duct dilatation. Other organs: Splenic length: 11.2 cm Pancreas: Pancreas is unremarkable. Kidneys: Kidneys are unremarkable. Adrenals: Adrenal glands are unremarkable. Lymph nodes: There are no enlarged nodes. Bowel: Partially visualized right lower quadrant ostomy. Partially visualized diffuse wall thickening of the colon. Lung bases: Lung bases are unremarkable Bones: Unremarkable.     1. Slight enlargement of the low attenuation area in the hepatic dome is nonspecific and could relate to posttreatment change in the cavity. Residual tumor would be difficult to evaluate or exclude. PET or MR would be better for further evaluation. 2. Diffuse wall thickening of the partially visualized colon  could relate to infection or inflammation.        Assessment/Plan:     1. Rectal cancer (CMS-HCC)     2. Rectal cancer metastasized to liver (CMS-HCC)     3. Encounter for antineoplastic chemotherapy     4. Chemotherapy-induced neutropenia (CMS-HCC)     5. History of pulmonary embolism     6. Shortness of breath     7. Decreased GFR           1.  Metastatic liver lesion: She underwent Y90 hepatic radioembolization of the liver lesion, per Dr. Juarez, on 6/15/2017. She denies any long-term issues, including pain. She followed up with ANDREW Spencer in September with CT from 9/8/2017 reviewed. Per review of Ms. Mike note and per patient findings were expected and she was recommended to undergo next CT monitoring in 6 months (March 2018), sooner if monthly CEA increases.    2.  History of pulmonary embolism: Acute PE ruled out on 8/24/2017, she continues on Xarelto    3.  Shortness of breath/dyspnea: Patient reports very slight improvement in dyspnea with exertion and so clusters care and activity. She was referred to Pulmonology and is now followed by Dr. Warren. She completed pulmonary function test on 9/18 and is due for CT scan per his office. She was also referred to cardiology and is now followed by Dr. Kelly. She is scheduled for an ECHO on Friday.    4.  Chemo-induced neutropenia: CBC reviewed, ANC is 1.25 today, she is afebrile and asymptomatic, Dr. Hardy is aware and we will proceed with chemo.    5.  Chemotherapy: She continues to tolerate chemo very well. She undergoes treatment every 2 weeks and follows up in office monthly. CMP reviewed and within acceptable limits. CEA is improved to 1.4 on current regimen of 5-FU (20% dose reduction). She will continue with monthly CEA and treatment every 2 weeks. She will return in 1 month for evaluation for chemo cycle #38.

## 2017-10-03 NOTE — PROGRESS NOTES
"Pt is here for her scheduled lab draw prior her afternoon chemotherapy. Port accessed (with 1\" regular needle/regular transparent tegaderm today per pt request). EMLA in use. Pt is in good spirits. SOB with activity being evaluated by future ECHO and pulmonary tests. Labs drawn and pt discharged to see APRN Kenisha and later to return for her chemotherapy at [facility]". (INR/PT not drawn as requested by APRN  Rabia Mckeon - not covered by insurance. MD notified. Pt does not want to pay for it if not covered). Port left accessed.  "

## 2017-10-03 NOTE — PROGRESS NOTES
Chemotherapy Verification - PRIMARY RN      Height = 166cm  Weight = 74.4kg  BSA = 1.85m2       Medication: 5-FU push   Dose: 320mg/m2  Calculated Dose: 592mg                             (In mg/m2, AUC, mg/kg)     Medication: 5-FU pump  Dose: 1920mg/m2  Calculated Dose: 3552mg over 46 hours                             (In mg/m2, AUC, mg/kg)      I confirm this process was performed independently with the BSA and all final chemotherapy dosing calculations congruent.  Any discrepancies of 5% or greater have been addressed with the chemotherapy pharmacist. The resolution of the discrepancy has been documented in the EPIC progress notes.

## 2017-10-04 ENCOUNTER — OFFICE VISIT (OUTPATIENT)
Dept: CARDIOLOGY | Facility: MEDICAL CENTER | Age: 77
End: 2017-10-04
Payer: MEDICARE

## 2017-10-04 ENCOUNTER — APPOINTMENT (RX ONLY)
Dept: URBAN - METROPOLITAN AREA CLINIC 4 | Facility: CLINIC | Age: 77
Setting detail: DERMATOLOGY
End: 2017-10-04

## 2017-10-04 VITALS
BODY MASS INDEX: 28.99 KG/M2 | WEIGHT: 174 LBS | DIASTOLIC BLOOD PRESSURE: 80 MMHG | HEIGHT: 65 IN | HEART RATE: 78 BPM | SYSTOLIC BLOOD PRESSURE: 136 MMHG | OXYGEN SATURATION: 96 %

## 2017-10-04 DIAGNOSIS — R94.4 DECREASED GFR: ICD-10-CM

## 2017-10-04 DIAGNOSIS — E78.5 HYPERLIPIDEMIA, UNSPECIFIED HYPERLIPIDEMIA TYPE: ICD-10-CM

## 2017-10-04 DIAGNOSIS — T45.1X5A CHEMOTHERAPY-INDUCED NEUTROPENIA (HCC): ICD-10-CM

## 2017-10-04 DIAGNOSIS — R94.31 ABNORMAL EKG: ICD-10-CM

## 2017-10-04 DIAGNOSIS — Z00.00 ROUTINE GENERAL MEDICAL EXAMINATION AT A HEALTH CARE FACILITY: ICD-10-CM

## 2017-10-04 DIAGNOSIS — R60.0 LOCALIZED EDEMA: ICD-10-CM

## 2017-10-04 DIAGNOSIS — L57.0 ACTINIC KERATOSIS: ICD-10-CM

## 2017-10-04 DIAGNOSIS — C78.7 SECONDARY MALIGNANT NEOPLASM OF LIVER (HCC): ICD-10-CM

## 2017-10-04 DIAGNOSIS — I10 ESSENTIAL HYPERTENSION: ICD-10-CM

## 2017-10-04 DIAGNOSIS — L71.8 OTHER ROSACEA: ICD-10-CM

## 2017-10-04 DIAGNOSIS — Z86.711 HISTORY OF PULMONARY EMBOLISM: ICD-10-CM

## 2017-10-04 DIAGNOSIS — E86.0 DEHYDRATION: ICD-10-CM

## 2017-10-04 DIAGNOSIS — D70.1 CHEMOTHERAPY-INDUCED NEUTROPENIA (HCC): ICD-10-CM

## 2017-10-04 DIAGNOSIS — N28.9 RENAL INSUFFICIENCY: ICD-10-CM

## 2017-10-04 DIAGNOSIS — D64.9 ANEMIA, UNSPECIFIED TYPE: ICD-10-CM

## 2017-10-04 DIAGNOSIS — D69.2 OTHER NONTHROMBOCYTOPENIC PURPURA: ICD-10-CM

## 2017-10-04 PROBLEM — L55.1 SUNBURN OF SECOND DEGREE: Status: ACTIVE | Noted: 2017-10-04

## 2017-10-04 PROBLEM — L85.3 XEROSIS CUTIS: Status: ACTIVE | Noted: 2017-10-04

## 2017-10-04 PROCEDURE — 17000 DESTRUCT PREMALG LESION: CPT

## 2017-10-04 PROCEDURE — ? COUNSELING

## 2017-10-04 PROCEDURE — 17003 DESTRUCT PREMALG LES 2-14: CPT

## 2017-10-04 PROCEDURE — 99214 OFFICE O/P EST MOD 30 MIN: CPT | Performed by: INTERNAL MEDICINE

## 2017-10-04 PROCEDURE — ? LIQUID NITROGEN

## 2017-10-04 PROCEDURE — ? PRESCRIPTION SAMPLES PROVIDED

## 2017-10-04 PROCEDURE — 99202 OFFICE O/P NEW SF 15 MIN: CPT | Mod: 25

## 2017-10-04 RX ORDER — FUROSEMIDE 40 MG/1
40 TABLET ORAL DAILY
Qty: 30 TAB | Refills: 11 | Status: SHIPPED | OUTPATIENT
Start: 2017-10-04 | End: 2018-11-13 | Stop reason: SDUPTHER

## 2017-10-04 ASSESSMENT — ENCOUNTER SYMPTOMS
EYES NEGATIVE: 1
COUGH: 0
CONSTITUTIONAL NEGATIVE: 1
RESPIRATORY NEGATIVE: 1
SORE THROAT: 0
STRIDOR: 0
GASTROINTESTINAL NEGATIVE: 1
LOSS OF CONSCIOUSNESS: 0
WHEEZING: 0
CLAUDICATION: 0
SHORTNESS OF BREATH: 0
FEVER: 0
HEMOPTYSIS: 0
NEUROLOGICAL NEGATIVE: 1
CHILLS: 0
PALPITATIONS: 0
WEAKNESS: 0
MUSCULOSKELETAL NEGATIVE: 1
ORTHOPNEA: 0
BRUISES/BLEEDS EASILY: 0
DIZZINESS: 0
PND: 0
SPUTUM PRODUCTION: 0

## 2017-10-04 ASSESSMENT — LOCATION SIMPLE DESCRIPTION DERM
LOCATION SIMPLE: RIGHT FOREARM
LOCATION SIMPLE: RIGHT ELBOW
LOCATION SIMPLE: LEFT CHEEK

## 2017-10-04 ASSESSMENT — LOCATION DETAILED DESCRIPTION DERM
LOCATION DETAILED: RIGHT LATERAL ELBOW
LOCATION DETAILED: LEFT INFERIOR CENTRAL MALAR CHEEK
LOCATION DETAILED: RIGHT PROXIMAL DORSAL FOREARM
LOCATION DETAILED: RIGHT PROXIMAL RADIAL DORSAL FOREARM

## 2017-10-04 ASSESSMENT — LOCATION ZONE DERM
LOCATION ZONE: ARM
LOCATION ZONE: FACE

## 2017-10-04 NOTE — HPI: SECONDARY COMPLAINT
How Severe Is This Condition?: moderate
Additional History: Today pt is more red because of the chemo.

## 2017-10-04 NOTE — LETTER
[facility]   [address], [address]  [place] [address]  Phone: [phone]  Fax: [phone]              Karlene Walters  1940    Encounter Date: 10/4/2017    Ahsan Haque M.D.          PROGRESS NOTE:  Subjective:   Karlene Walters is a 77 y.o. female who presents today as a follow-up for shortness of breath. She continues to undergo chemotherapy with the same shortness breath but no changes since she was last seen. She continues on her blood thinner for her history of PEs. She otherwise is not having any changes to her symptoms. She occasionally takes the Lasix for lower extremity edema and will usually take 40 instead of 20.    Past Medical History:   Diagnosis Date   • Pneumonia 05/2017    tx'd with antibx   • Chemotherapy-induced neutropenia (CMS-HCC) 9/28/2016   • Routine general medical examination at a health care facility 3/14/2016    3/14/16   • Renal insufficiency 3/14/2016   • Lightheadedness 9/17/2015   • Blood clotting disorder (CMS-HCC) 08/2015    bilat PE    • Cancer (CMS-HCC) 09/2012    rectal cancer,  Liver CA-2015   • Blood transfusion 1957   • Arrhythmia    • CATARACT     removed b/l   • Chickenpox    • Colon cancer (CMS-HCC)    • Dental disorder     dentures UPPERS AND LOWERS   • Fall    • Austrian measles    • Hemorrhagic disorder (CMS-HCC)     on Xarelto    • Hypercholesteremia    • Hypertension     no meds currently    • Ileostomy in place (CMS-HCC)    • Indigestion    • Influenza    • Mumps    • Obstruction     ileostomy   • Other specified symptom associated with female genital organs     HYSTERECTOMY 1993   • Pulmonary embolism (CMS-HCC)    • 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)    • Seasonal allergies    • Tonsillitis      Past Surgical History:   Procedure Laterality Date   • COLONOSCOPY WITH BIOPSY  8/23/2015    Procedure: COLONOSCOPY WITH BIOPSY;  Surgeon: Wilbur Glasgow M.D.;  Location: ENDOSCOPY [facility]  [facility];  Service:    • HEPATIC ABLATION LAPAROSCOPIC  7/6/2015    Procedure: HEPATIC ABLATION LAPAROSCOPIC.;  Surgeon: Kit West M.D.;  Location: SURGERY [facility];  Service:    • CATH PLACEMENT Left 7/6/2015    Procedure: CATH PLACEMENT CEPHALIC POWERPORT;  Surgeon: Kit West M.D.;  Location: SURGERY [facility];  Service:    • FISTULA IN ANO REPAIR  1/28/2015    Performed by Kolton Montgomery M.D. at SURGERY [facility]   • PERINEAL PROCEDURE  1/28/2015    Performed by Kolton Montgomery M.D. at SURGERY [facility]   • FISTULA IN ANO REPAIR  10/15/2014    Performed by Kolton Montgomery M.D. at [facility]   • PERINEAL PROCEDURE  10/15/2014    Performed by Kolton Montgomery M.D. at [facility]   • IRRIGATION & DEBRIDEMENT GENERAL  2/19/2014    Performed by Kolton Montgomery M.D. at [facility]   • FLAP GRAFT  2/19/2014    Performed by Kolton Montgomery M.D. at [facility]   • PERINEAL PROCEDURE  12/18/2013    Performed by Kolton Montgomery M.D. at [facility]   • MYOCUTANEOUS FLAP  12/18/2013    Performed by Kolton Montgomery M.D. at [facility]   • IRRIGATION & DEBRIDEMENT GENERAL  10/23/2013    Performed by Kolton Montgomery M.D. at SURGERY [facility]   • FISTULA IN ANO REPAIR  9/4/2013    Performed by Kolton Montgomery M.D. at [facility]   • FLAP ROTATION  9/4/2013    Performed by Kolton Montgomery M.D. at SURGERY [facility]   • RECOVERY  8/26/2013    Performed by Cath-Recovery Surgery at SURGERY SAME DAY [facility]   • PROCTOSCOPY  7/17/2013    Performed by Kolton Montgomery M.D. at [facility]   • BIOPSY GENERAL  7/17/2013    Performed by Kolton Montgomery M.D. at SURGERY [facility]   • SIGMOIDOSCOPY  2/27/2013    Performed by Kolton Montgomery M.D. at [facility] ORS   • FISTULA IN ANO REPAIR  2/27/2013    Performed by Kolton Montgomery M.D. at SURGERY [facility]   • LAPAROSCOPY  10/8/2012     "Performed by Kolton Montgomery M.D. at SURGERY [facility] ORS   • ILEO LOOP DIVERSION  10/8/2012    Performed by Kolton Montgomery M.D. at SURGERY [facility]   • COLECTOMY  9/26/2012    Performed by Kolton Montgomery M.D. at SURGERY [facility] ORS   • COLONOSCOPY FLEX W/ENDO US  9/20/2012    Performed by Harshil Bolton M.D. at SURGERY [facility] ORS   • OTHER  2009    left eye torn retina repair   • CATARACT EXTRACTION WITH IOL  2004    bilateral   • ABDOMINAL HYSTERECTOMY TOTAL  1983   • CYST EXCISION  1972    ovarian   • COLON RESECTION     • EYE SURGERY      cataracts   • HYSTERECTOMY LAPAROSCOPY     • PB REMV 2ND CATARACT,CORN-SCLER SECTN     • TONSILLECTOMY       Family History   Problem Relation Age of Onset   • Heart Disease Mother    • Heart Failure Mother    • Hypertension Mother    • Hyperlipidemia Mother    • Cancer Mother    • Cancer Maternal Aunt 60     breast ca   • Lung Disease Brother      COPD/Emphysema/Smoker   • Cancer Brother      prostate cancer   • Alcohol/Drug Brother      Alcohol   • Kidney Disease Father      renal failure     History   Smoking Status   • Never Smoker   Smokeless Tobacco   • Never Used     Allergies   Allergen Reactions   • Codeine      \"gets drunk\"   • Oxaliplatin Anaphylaxis   • Pcn [Penicillins] Itching   • Sulfa Drugs Itching   • Tape Rash     PAPER TAPE OK     Outpatient Encounter Prescriptions as of 10/4/2017   Medication Sig Dispense Refill   • furosemide (LASIX) 40 MG Tab Take 1 Tab by mouth every day. 30 Tab 11   • ranitidine (ZANTAC) 150 MG Tab TAKE 1 TABLET 2 TIMES A DAY. 60 Tab 3   • XARELTO 20 MG Tab tablet TAKE 1 TABLET WITH DINNER. 30 Tab 6   • Esomeprazole Magnesium 10 MG Pack Take  by mouth.     • oxycodone immediate-release (ROXICODONE) 5 MG Tab Take 1 Tab by mouth every four hours as needed for Severe Pain. 30 Tab 0   • potassium chloride ER (KLOR-CON) 10 MEQ tablet Take 1 Tab by mouth every day. 30 Tab 3   • lidocaine-prilocaine (EMLA) 2.5-2.5 % Cream APPLY " "A THICK FILM OVER THE PORT ACCESS SITE PRIOR TO ACCESS 30 g 2   • loperamide (IMODIUM) 1 MG/5ML Liquid Take 2 mg by mouth 4 times a day as needed for Diarrhea.     • ondansetron (ZOFRAN) 4 MG Tab tablet      • cyanocobalamin (VITAMIN B-12) 500 MCG Tab Take 500 mcg by mouth every day.     • Multiple Vitamins-Minerals (CENTRUM SILVER PO) Take  by mouth.     • Cholecalciferol (VITAMIN D3) 400 UNITS CAPS Take 1 Cap by mouth every day.     • loratadine (CLARITIN) 10 MG TABS Take 10 mg by mouth every day. Indications: Hayfever     • [DISCONTINUED] furosemide (LASIX) 20 MG Tab Take 1 Tab by mouth every day. PRN EDEMA 30 Tab 3     Facility-Administered Encounter Medications as of 10/4/2017   Medication Dose Route Frequency Provider Last Rate Last Dose   • heparin pf injection 500 Units  500 Units Intracatheter PRN Ella De, A.P.N.   500 Units at 08/22/17 0856     Review of Systems   Constitutional: Negative.  Negative for chills, fever and malaise/fatigue.   HENT: Negative.  Negative for sore throat.    Eyes: Negative.    Respiratory: Negative.  Negative for cough, hemoptysis, sputum production, shortness of breath, wheezing and stridor.    Cardiovascular: Positive for leg swelling. Negative for chest pain, palpitations, orthopnea, claudication and PND.   Gastrointestinal: Negative.    Genitourinary: Negative.    Musculoskeletal: Negative.    Skin: Negative.    Neurological: Negative.  Negative for dizziness, loss of consciousness and weakness.   Endo/Heme/Allergies: Negative.  Does not bruise/bleed easily.   All other systems reviewed and are negative.       Objective:   /80   Pulse 78   Ht 1.651 m (5' 5\")   Wt 78.9 kg (174 lb)   SpO2 96%   BMI 28.96 kg/m²      Physical Exam   Constitutional: She is oriented to person, place, and time. She appears well-developed and well-nourished. No distress.   HENT:   Head: Normocephalic.   Mouth/Throat: Oropharynx is clear and moist.   Eyes: EOM are normal. Pupils are " equal, round, and reactive to light. Right eye exhibits no discharge. Left eye exhibits no discharge. No scleral icterus.   Neck: Normal range of motion. Neck supple. No JVD present. No tracheal deviation present.   Cardiovascular: Normal rate, regular rhythm, S1 normal, S2 normal, normal heart sounds, intact distal pulses and normal pulses.  Exam reveals no gallop, no S3, no S4 and no friction rub.    No murmur heard.   No systolic murmur is present    No diastolic murmur is present   Pulses:       Carotid pulses are 2+ on the right side, and 2+ on the left side.       Radial pulses are 2+ on the right side, and 2+ on the left side.        Dorsalis pedis pulses are 2+ on the right side, and 2+ on the left side.        Posterior tibial pulses are 2+ on the right side, and 2+ on the left side.   Pulmonary/Chest: Effort normal and breath sounds normal. No respiratory distress. She has no wheezes. She has no rales.   Abdominal: Soft. Bowel sounds are normal. She exhibits no distension and no mass. There is no tenderness. There is no rebound and no guarding.   Musculoskeletal: She exhibits no edema.   Neurological: She is alert and oriented to person, place, and time. No cranial nerve deficit.   Skin: Skin is warm and dry. She is not diaphoretic. No pallor.   Psychiatric: She has a normal mood and affect. Her behavior is normal. Judgment and thought content normal.   Nursing note and vitals reviewed.      Assessment:     1. Hyperlipidemia, unspecified hyperlipidemia type     2. Essential hypertension     3. History of pulmonary embolism  furosemide (LASIX) 40 MG Tab   4. Chemotherapy-induced neutropenia (CMS-HCC)     5. Abnormal EKG     6. Anemia, unspecified type  furosemide (LASIX) 40 MG Tab   7. Dehydration     8. Decreased GFR     9. Renal insufficiency     10. Routine general medical examination at a health care facility     11. Secondary malignant neoplasm of liver (CMS-HCC)     12. Localized edema  furosemide  (LASIX) 40 MG Tab       Medical Decision Making:  Today's Assessment / Status / Plan:        76 y/o F with PE on Xarelto with normal echocardiogram. From a cardiovascular standpoint she continues to do incredibly well. We will simply refill her Lasix at 40 mg per dose and see her back in 6 months. Otherwise no changes to her management today.     1. Orthostatic tachycardia  - resolved     2. Shortness of breath with exertion and PEs, normal echo  - cont Rivaroxiban 20    3. LE Edema    - lasix 40 PRN    Thank for you allowing me to take part in your patient's care, please call should you have any questions or would like to discuss this patient.      Manan Quiñonez M.D.  [address] [place] [address] [address] [place] [id number]  VIA In Basket

## 2017-10-04 NOTE — PROCEDURE: LIQUID NITROGEN
Post-Care Instructions: I reviewed with the patient in detail post-care instructions. Patient is to wear sunprotection, and avoid picking at any of the treated lesions. Pt may apply Vaseline to crusted or scabbing areas.
Render Post-Care Instructions In Note?: yes
Consent: The patient's consent was obtained including but not limited to risks of crusting, scabbing, blistering, scarring, darker or lighter pigmentary change, recurrence, incomplete removal and infection.
Detail Level: Detailed
Duration Of Freeze Thaw-Cycle (Seconds): 0

## 2017-10-04 NOTE — HPI: SKIN LESION
Is This A New Presentation, Or A Follow-Up?: Growths
How Severe Is Your Skin Lesion?: moderate
Has Your Skin Lesion Been Treated?: not been treated
Additional History: Pt is on chemotherapy, so she tries to not treat spots with anything in general to not cause any intervention with the chemo treatment.

## 2017-10-04 NOTE — PROGRESS NOTES
Subjective:   Karlene Walters is a 77 y.o. female who presents today as a follow-up for shortness of breath. She continues to undergo chemotherapy with the same shortness breath but no changes since she was last seen. She continues on her blood thinner for her history of PEs. She otherwise is not having any changes to her symptoms. She occasionally takes the Lasix for lower extremity edema and will usually take 40 instead of 20.    Past Medical History:   Diagnosis Date   • Pneumonia 05/2017    tx'd with antibx   • Chemotherapy-induced neutropenia (CMS-HCC) 9/28/2016   • Routine general medical examination at a health care facility 3/14/2016    3/14/16   • Renal insufficiency 3/14/2016   • Lightheadedness 9/17/2015   • Blood clotting disorder (CMS-HCC) 08/2015    bilat PE    • Cancer (CMS-HCC) 09/2012    rectal cancer,  Liver CA-2015   • Blood transfusion 1957   • Arrhythmia    • CATARACT     removed b/l   • Chickenpox    • Colon cancer (CMS-HCC)    • Dental disorder     dentures UPPERS AND LOWERS   • Fall    • [place] measles    • Hemorrhagic disorder (CMS-HCC)     on Xarelto    • Hypercholesteremia    • Hypertension     no meds currently    • Ileostomy in place (CMS-HCC)    • Indigestion    • Influenza    • Mumps    • Obstruction     ileostomy   • Other specified symptom associated with female genital organs     HYSTERECTOMY 1993   • Pulmonary embolism (CMS-HCC)    • 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)    • Seasonal allergies    • Tonsillitis      Past Surgical History:   Procedure Laterality Date   • COLONOSCOPY WITH BIOPSY  8/23/2015    Procedure: COLONOSCOPY WITH BIOPSY;  Surgeon: Wilbur Glasgow M.D.;  Location: ENDOSCOPY [facility];  Service:    • HEPATIC ABLATION LAPAROSCOPIC  7/6/2015    Procedure: HEPATIC ABLATION LAPAROSCOPIC.;  Surgeon: Kit West M.D.;  Location: SURGERY [facility];  Service:    • CATH PLACEMENT Left 7/6/2015    Procedure: CATH PLACEMENT CEPHALIC  [facility];  Surgeon: Kit West M.D.;  Location: [facility];  Service:    • FISTULA IN ANO REPAIR  1/28/2015    Performed by Kolton Montgomery M.D. at SURGERY [facility]   • PERINEAL PROCEDURE  1/28/2015    Performed by Kolton Montgomery M.D. at SURGERY [facility]   • FISTULA IN ANO REPAIR  10/15/2014    Performed by Kolton Montgomery M.D. at SURGERY [facility]   • PERINEAL PROCEDURE  10/15/2014    Performed by Kolton Montgomery M.D. at SURGERY [facility]   • IRRIGATION & DEBRIDEMENT GENERAL  2/19/2014    Performed by Kolton Montgomery M.D. at SURGERY [facility]   • FLAP GRAFT  2/19/2014    Performed by Kolton Montgomery M.D. at [facility]   • PERINEAL PROCEDURE  12/18/2013    Performed by Kolton Montgomery M.D. at SURGERY [facility]   • MYOCUTANEOUS FLAP  12/18/2013    Performed by Kolton Montgomery M.D. at SURGERY [facility]   • IRRIGATION & DEBRIDEMENT GENERAL  10/23/2013    Performed by Kolton Montgomery M.D. at SURGERY [facility]   • FISTULA IN ANO REPAIR  9/4/2013    Performed by Kolton Montgomery M.D. at [facility]   • FLAP ROTATION  9/4/2013    Performed by Kolton Montgomery M.D. at [facility]   • RECOVERY  8/26/2013    Performed by Cath-Recovery Surgery at SURGERY SAME DAY [facility]   • PROCTOSCOPY  7/17/2013    Performed by Kolton Montgomery M.D. at SURGERY [facility]   • BIOPSY GENERAL  7/17/2013    Performed by Kolton Montgomery M.D. at SURGERY [facility]   • SIGMOIDOSCOPY  2/27/2013    Performed by Kolton Montgomery M.D. at [facility]   • FISTULA IN ANO REPAIR  2/27/2013    Performed by Kolton Montgomery M.D. at SURGERY [facility]   • LAPAROSCOPY  10/8/2012    Performed by Kolton Montgomery M.D. at SURGERY [facility]   • ILEO LOOP DIVERSION  10/8/2012    Performed by Kolton Montgomery M.D. at SURGERY [facility]   • COLECTOMY  9/26/2012    Performed by Kolton Montgomery M.D. at SURGERY [facility]   • COLONOSCOPY FLEX W/ENDO  "US  9/20/2012    Performed by Harshil Bolton M.D. at SURGERY [facility]   • OTHER  2009    left eye torn retina repair   • CATARACT EXTRACTION WITH IOL  2004    bilateral   • ABDOMINAL HYSTERECTOMY TOTAL  1983   • CYST EXCISION  1972    ovarian   • COLON RESECTION     • EYE SURGERY      cataracts   • HYSTERECTOMY LAPAROSCOPY     • PB REMV 2ND CATARACT,CORN-SCLER SECTN     • TONSILLECTOMY       Family History   Problem Relation Age of Onset   • Heart Disease Mother    • Heart Failure Mother    • Hypertension Mother    • Hyperlipidemia Mother    • Cancer Mother    • Cancer Maternal Aunt 60     breast ca   • Lung Disease Brother      COPD/Emphysema/Smoker   • Cancer Brother      prostate cancer   • Alcohol/Drug Brother      Alcohol   • Kidney Disease Father      renal failure     History   Smoking Status   • Never Smoker   Smokeless Tobacco   • Never Used     Allergies   Allergen Reactions   • Codeine      \"gets drunk\"   • Oxaliplatin Anaphylaxis   • Pcn [Penicillins] Itching   • Sulfa Drugs Itching   • Tape Rash     PAPER TAPE OK     Outpatient Encounter Prescriptions as of 10/4/2017   Medication Sig Dispense Refill   • furosemide (LASIX) 40 MG Tab Take 1 Tab by mouth every day. 30 Tab 11   • ranitidine (ZANTAC) 150 MG Tab TAKE 1 TABLET 2 TIMES A DAY. 60 Tab 3   • XARELTO 20 MG Tab tablet TAKE 1 TABLET WITH DINNER. 30 Tab 6   • Esomeprazole Magnesium 10 MG Pack Take  by mouth.     • oxycodone immediate-release (ROXICODONE) 5 MG Tab Take 1 Tab by mouth every four hours as needed for Severe Pain. 30 Tab 0   • potassium chloride ER (KLOR-CON) 10 MEQ tablet Take 1 Tab by mouth every day. 30 Tab 3   • lidocaine-prilocaine (EMLA) 2.5-2.5 % Cream APPLY A THICK FILM OVER THE PORT ACCESS SITE PRIOR TO ACCESS 30 g 2   • loperamide (IMODIUM) 1 MG/5ML Liquid Take 2 mg by mouth 4 times a day as needed for Diarrhea.     • ondansetron (ZOFRAN) 4 MG Tab tablet      • cyanocobalamin (VITAMIN B-12) 500 MCG Tab Take 500 mcg by " "mouth every day.     • Multiple Vitamins-Minerals (CENTRUM SILVER PO) Take  by mouth.     • Cholecalciferol (VITAMIN D3) 400 UNITS CAPS Take 1 Cap by mouth every day.     • loratadine (CLARITIN) 10 MG TABS Take 10 mg by mouth every day. Indications: Hayfever     • [DISCONTINUED] furosemide (LASIX) 20 MG Tab Take 1 Tab by mouth every day. PRN EDEMA 30 Tab 3     Facility-Administered Encounter Medications as of 10/4/2017   Medication Dose Route Frequency Provider Last Rate Last Dose   • heparin pf injection 500 Units  500 Units Intracatheter PRN Ella De, A.P.N.   500 Units at 08/22/17 0856     Review of Systems   Constitutional: Negative.  Negative for chills, fever and malaise/fatigue.   HENT: Negative.  Negative for sore throat.    Eyes: Negative.    Respiratory: Negative.  Negative for cough, hemoptysis, sputum production, shortness of breath, wheezing and stridor.    Cardiovascular: Positive for leg swelling. Negative for chest pain, palpitations, orthopnea, claudication and PND.   Gastrointestinal: Negative.    Genitourinary: Negative.    Musculoskeletal: Negative.    Skin: Negative.    Neurological: Negative.  Negative for dizziness, loss of consciousness and weakness.   Endo/Heme/Allergies: Negative.  Does not bruise/bleed easily.   All other systems reviewed and are negative.       Objective:   /80   Pulse 78   Ht 1.651 m (5' 5\")   Wt 78.9 kg (174 lb)   SpO2 96%   BMI 28.96 kg/m²     Physical Exam   Constitutional: She is oriented to person, place, and time. She appears well-developed and well-nourished. No distress.   HENT:   Head: Normocephalic.   Mouth/Throat: Oropharynx is clear and moist.   Eyes: EOM are normal. Pupils are equal, round, and reactive to light. Right eye exhibits no discharge. Left eye exhibits no discharge. No scleral icterus.   Neck: Normal range of motion. Neck supple. No JVD present. No tracheal deviation present.   Cardiovascular: Normal rate, regular rhythm, S1 " normal, S2 normal, normal heart sounds, intact distal pulses and normal pulses.  Exam reveals no gallop, no S3, no S4 and no friction rub.    No murmur heard.   No systolic murmur is present    No diastolic murmur is present   Pulses:       Carotid pulses are 2+ on the right side, and 2+ on the left side.       Radial pulses are 2+ on the right side, and 2+ on the left side.        Dorsalis pedis pulses are 2+ on the right side, and 2+ on the left side.        Posterior tibial pulses are 2+ on the right side, and 2+ on the left side.   Pulmonary/Chest: Effort normal and breath sounds normal. No respiratory distress. She has no wheezes. She has no rales.   Abdominal: Soft. Bowel sounds are normal. She exhibits no distension and no mass. There is no tenderness. There is no rebound and no guarding.   Musculoskeletal: She exhibits no edema.   Neurological: She is alert and oriented to person, place, and time. No cranial nerve deficit.   Skin: Skin is warm and dry. She is not diaphoretic. No pallor.   Psychiatric: She has a normal mood and affect. Her behavior is normal. Judgment and thought content normal.   Nursing note and vitals reviewed.      Assessment:     1. Hyperlipidemia, unspecified hyperlipidemia type     2. Essential hypertension     3. History of pulmonary embolism  furosemide (LASIX) 40 MG Tab   4. Chemotherapy-induced neutropenia (CMS-HCC)     5. Abnormal EKG     6. Anemia, unspecified type  furosemide (LASIX) 40 MG Tab   7. Dehydration     8. Decreased GFR     9. Renal insufficiency     10. Routine general medical examination at a health care facility     11. Secondary malignant neoplasm of liver (CMS-HCC)     12. Localized edema  furosemide (LASIX) 40 MG Tab       Medical Decision Making:  Today's Assessment / Status / Plan:        76 y/o F with PE on Xarelto with normal echocardiogram. From a cardiovascular standpoint she continues to do incredibly well. We will simply refill her Lasix at 40 mg per  dose and see her back in 6 months. Otherwise no changes to her management today.     1. Orthostatic tachycardia  - resolved     2. Shortness of breath with exertion and PEs, normal echo  - cont Rivaroxiban 20    3. LE Edema    - lasix 40 PRN    Thank for you allowing me to take part in your patient's care, please call should you have any questions or would like to discuss this patient.

## 2017-10-05 ENCOUNTER — OUTPATIENT INFUSION SERVICES (OUTPATIENT)
Dept: ONCOLOGY | Facility: MEDICAL CENTER | Age: 77
End: 2017-10-05
Attending: INTERNAL MEDICINE
Payer: MEDICARE

## 2017-10-05 VITALS
HEART RATE: 83 BPM | TEMPERATURE: 97.4 F | HEIGHT: 65 IN | OXYGEN SATURATION: 97 % | BODY MASS INDEX: 28.83 KG/M2 | SYSTOLIC BLOOD PRESSURE: 117 MMHG | WEIGHT: 173.06 LBS | RESPIRATION RATE: 18 BRPM | DIASTOLIC BLOOD PRESSURE: 70 MMHG

## 2017-10-05 DIAGNOSIS — R94.4 DECREASED GFR: ICD-10-CM

## 2017-10-05 DIAGNOSIS — C20 RECTAL CANCER (HCC): ICD-10-CM

## 2017-10-05 PROCEDURE — 700111 HCHG RX REV CODE 636 W/ 250 OVERRIDE (IP)

## 2017-10-05 PROCEDURE — 96523 IRRIG DRUG DELIVERY DEVICE: CPT

## 2017-10-05 RX ADMIN — HEPARIN 500 UNITS: 100 SYRINGE at 15:29

## 2017-10-05 ASSESSMENT — PAIN SCALES - GENERAL: PAINLEVEL: NO PAIN

## 2017-10-06 ENCOUNTER — HOSPITAL ENCOUNTER (OUTPATIENT)
Dept: RADIOLOGY | Facility: MEDICAL CENTER | Age: 77
End: 2017-10-06
Attending: INTERNAL MEDICINE
Payer: MEDICARE

## 2017-10-06 ENCOUNTER — HOSPITAL ENCOUNTER (OUTPATIENT)
Dept: CARDIOLOGY | Facility: MEDICAL CENTER | Age: 77
End: 2017-10-06
Attending: INTERNAL MEDICINE
Payer: MEDICARE

## 2017-10-06 DIAGNOSIS — R06.09 DOE (DYSPNEA ON EXERTION): ICD-10-CM

## 2017-10-06 LAB
LV EJECT FRACT  99904: 60
LV EJECT FRACT MOD 2C 99903: 60.84
LV EJECT FRACT MOD 4C 99902: 53.7
LV EJECT FRACT MOD BP 99901: 56.87

## 2017-10-06 PROCEDURE — 93306 TTE W/DOPPLER COMPLETE: CPT | Mod: 26 | Performed by: INTERNAL MEDICINE

## 2017-10-06 PROCEDURE — 71250 CT THORAX DX C-: CPT

## 2017-10-06 PROCEDURE — 93306 TTE W/DOPPLER COMPLETE: CPT

## 2017-10-06 NOTE — PROGRESS NOTES
Patient arrived ambulatory to the [facility] with spouse for 5FU pump de-access. Patient expressed concern that the pump should be finished. Reviewed settings reservoir volume 1.6ml infusing at a rate of 1.5ml/hr. Patient remained in chair with spouse and read book until infusion completed with call light in reach. Upon infusion completion reviewed pump settings. Reservoir volume 0, 73.6ml infused. Visualized brisk blood return, flushed port per protocol, placed gauze and tape dressing. Confirmed next apt date and time with patient. Patient left the [facility] ambulatory in no signs of distress.

## 2017-10-09 NOTE — ASSESSMENT & PLAN NOTE
Acute Otitis Media with Infection (Child)    Your child has a middle ear infection (acute otitis media). It is caused by bacteria or fungi. The middle ear is the space behind the eardrum. The eustachian tube connects the ear to the nasal passage. The eustachian tubes help drain fluid from the ears. They also keep the air pressure equal inside and outside the ears. These tubes are shorter and more horizontal in children. This makes it more likely for the tubes to become blocked. A blockage lets fluid and pressure build up in the middle ear. Bacteria or fungi can grow in this fluid and cause an ear infection. This infection is commonly known as an earache.  The main symptom of an ear infection is ear pain. Other symptoms may include pulling at the ear, being more fussy than usual, decreased appetite, and vomiting or diarrhea. Your child s hearing may also be affected. Your child may have had a respiratory infection first.  An ear infection may clear up on its own. Or your child may need to take medicine. After the infection goes away, your child may still have fluid in the middle ear. It may take weeks or months for this fluid to go away. During that time, your child may have temporary hearing loss. But all other symptoms of the earache should be gone.  Home care  Follow these guidelines when caring for your child at home:    The healthcare provider will likely prescribe medicines for pain. The provider may also prescribe antibiotics or antifungals to treat the infection. These may be liquid medicines to give by mouth. Or they may be ear drops. Follow the provider s instructions for giving these medicines to your child.    Because ear infections can clear up on their own, the provider may suggest waiting for a few days before giving your child medicines for infection.    To reduce pain, have your child rest in an upright position. Hot or cold compresses held against the ear may help ease pain.    Keep the ear dry.  She has had some problems with diarrhea but none recently.   Have your child wear a shower cap when bathing.  To help prevent future infections:    Avoid smoking near your child. Secondhand smoke raises the risk for ear infections in children.    Make sure your child gets all appropriate vaccines.    Do not bottle-feed while your baby is lying on his or her back. (This position can cause middle ear infections because it allows milk to run into the eustachian tubes.)        If you breastfeed, continue until your child is 6 to 12 months of age.  To apply ear drops:  1. Put the bottle in warm water if the medicine is kept in the refrigerator. Cold drops in the ear are uncomfortable.  2. Have your child lie down on a flat surface. Gently hold your child s head to one side.  3. Remove any drainage from the ear with a clean tissue or cotton swab. Clean only the outer ear. Don t put the cotton swab into the ear canal.  4. Straighten the ear canal by gently pulling the earlobe up and back.  5. Keep the dropper a half-inch above the ear canal. This will keep the dropper from becoming contaminated. Put the drops against the side of the ear canal.  6. Have your child stay lying down for 2 to 3 minutes. This gives time for the medicine to enter the ear canal. If your child doesn t have pain, gently massage the outer ear near the opening.  7. Wipe any extra medicine away from the outer ear with a clean cotton ball.  Follow-up care  Follow up with your child s healthcare provider as directed. Your child will need to have the ear rechecked to make sure the infection has resolved. Check with your doctor to see when they want to see your child.  Special note to parents  If your child continues to get earaches, he or she may need ear tubes. The provider will put small tubes in your child s eardrum to help keep fluid from building up. This procedure is a simple and works well.  When to seek medical advice  Unless advised otherwise, call your child's healthcare provider if:    Your child is 3  months old or younger and has a fever of 100.4 F (38 C) or higher. Your child may need to see a healthcare provider.    Your child is of any age and has fevers higher than 104 F (40 C) that come back again and again.  Call your child's healthcare provider for any of the following:    New symptoms, especially swelling around the ear or weakness of face muscles    Severe pain    Infection seems to get worse, not better     Neck pain    Your child acts very sick or not himself or herself    Fever or pain do not improve with antibiotics after 48 hours  Date Last Reviewed: 5/3/2015    7179-4686 [employer]. [address] [place] [address], [place] [address]. All rights reserved. This information is not intended as a substitute for professional medical care. Always follow your healthcare professional's instructions.          Bronchospasm (Child)  The bronchi are the two tubes connecting the windpipe to the left and right lungs. If the bronchi become irritated and inflamed, they can constrict or narrow. This makes it hard to breathe. This condition is called bronchospasm. Bronchospasm can be caused by allergies, asthma, a respiratory infection, or reaction to a medication.  A child with bronchospasm may cough, wheeze, or be short of breath. The inflamed area produces mucus, which can partially block the airways. The chest muscles can tighten. The child can also have a fever.  Children with severe bronchospasm may be admitted to the hospital for observation, intravenous (IV) fluids, and oxygen. Children with less severe symptoms may be given medication, observed, then discharged home.  Home Care:  Medications: The doctor may prescribe medications. Follow the doctor s instructions for giving these medications to your child. Avoid giving your child any medications or products that have not been approved by the doctor. NOTE: Do not give your child cough or cold medicine unless the doctor specifically tells you to do  so.  General Care:   1. Know the warning signs of a bronchospasm attack. These include irritability, restless sleep, no interest in feeding, fever, and cough. Also know what medications to give if you see these signs.  2. Allow your child plenty of time to rest. If possible, raise the head of the mattress slightly to ease breathing when sleeping or prop up your child s head and torso with pillows.  3. Avoid tobacco smoke. Secondhand smoke can make it more difficult for your child to breathe.  Follow Up  as advised by the doctor or our staff.  Special Note To Parents:  Over-the-counter cough and cold medicines have not been proven to be any more helpful than a placebo (sweet syrup with no medicine in it). Also, they can produce serious side effects, especially in children under 2 years of age. Therefore, do not give over-the-counter cough and cold medicines to a child under 6 years of age unless your doctor has specifically advised you to do so.  Get Prompt Medical Attention  if any of the following occur:    Fever greater than 100.4 F (38 C)    Continuing symptoms without relief from medication    Increasing difficulty breathing    7694-9298 [employer]. [address] [place] [address], [place] [address]. All rights reserved. This information is not intended as a substitute for professional medical care. Always follow your healthcare professional's instructions.

## 2017-10-13 ENCOUNTER — OFFICE VISIT (OUTPATIENT)
Dept: PULMONOLOGY | Facility: HOSPICE | Age: 77
End: 2017-10-13
Payer: MEDICARE

## 2017-10-13 VITALS
RESPIRATION RATE: 16 BRPM | OXYGEN SATURATION: 98 % | HEART RATE: 105 BPM | TEMPERATURE: 97.7 F | SYSTOLIC BLOOD PRESSURE: 134 MMHG | WEIGHT: 173 LBS | DIASTOLIC BLOOD PRESSURE: 82 MMHG | HEIGHT: 65 IN | BODY MASS INDEX: 28.82 KG/M2

## 2017-10-13 DIAGNOSIS — J44.9 COPD, MODERATE (HCC): ICD-10-CM

## 2017-10-13 PROCEDURE — 99213 OFFICE O/P EST LOW 20 MIN: CPT | Performed by: INTERNAL MEDICINE

## 2017-10-13 RX ORDER — ALBUTEROL SULFATE 90 UG/1
2 AEROSOL, METERED RESPIRATORY (INHALATION) EVERY 4 HOURS PRN
Qty: 1 INHALER | Refills: 11 | Status: SHIPPED | OUTPATIENT
Start: 2017-10-13 | End: 2018-11-26 | Stop reason: SDUPTHER

## 2017-10-13 NOTE — PROGRESS NOTES
Karlene Walters is a 77 y.o. female here for dyspnea on exertion.    History of Present Illness:    The patient is a 77-year-old female comes in for follow-up after undergoing some testing. She's had dyspnea on exertion that has not been well explained on previous testing. She had a CT scan of the chest which showed no evidence of pulmonary emboli and pulmonary function tests showed normal spirometry and however there was some mild air trapping in the diffusion was mildly reduced. The patient is a never smoker. Her echocardiogram showed normal LV function. We did a high-resolution CT scan of the chest to check for possible occult interstitial lung disease especially in light of her history of cancer and chemotherapy. This demonstrated emphysematous lungs and some areas of basilar atelectasis but there was no evidence of any interstitial lung disease.    Constitutional:  Negative for fever, chills, sweats, and fatigue.  Eyes:  Negative for eye pain and visual changes.  HENT:  Negative for tinnitus and hoarse voice.  Cardiovascular:  Negative for chest pain, leg swelling, syncope and orthopnea.  Respiratory:  See HPI for pertinent negatives  Sleep:  Negative for somnolence, loud snoring, sleep disturbance due to breathing, insomnia.  Gastrointestinal:  Negative for dysphagia, nausea and abdominal pain.  Heme/lymph:  Denies easy bruising, blood clots.  Musculoskeletal:  Negative for arthralgias, sore muscles and back pain.  Skin:  Negative for rash and color change.  Neurological:  Negative for headaches, lightheadedness and weakness.  Psychiatric:  Denies depression.    Current Outpatient Prescriptions   Medication Sig Dispense Refill   • Tiotropium Bromide-Olodaterol (STIOLTO RESPIMAT) 2.5-2.5 MCG/ACT Aero Soln Take 2 Puffs by mouth every day. 1 Inhaler 11   • albuterol (PROAIR HFA) 108 (90 Base) MCG/ACT Aero Soln inhalation aerosol Inhale 2 Puffs by mouth every four hours as needed for Shortness of Breath  (wheezing). 1 Inhaler 11   • furosemide (LASIX) 40 MG Tab Take 1 Tab by mouth every day. 30 Tab 11   • ranitidine (ZANTAC) 150 MG Tab TAKE 1 TABLET 2 TIMES A DAY. 60 Tab 3   • XARELTO 20 MG Tab tablet TAKE 1 TABLET WITH DINNER. 30 Tab 6   • Esomeprazole Magnesium 10 MG Pack Take  by mouth.     • oxycodone immediate-release (ROXICODONE) 5 MG Tab Take 1 Tab by mouth every four hours as needed for Severe Pain. 30 Tab 0   • potassium chloride ER (KLOR-CON) 10 MEQ tablet Take 1 Tab by mouth every day. 30 Tab 3   • lidocaine-prilocaine (EMLA) 2.5-2.5 % Cream APPLY A THICK FILM OVER THE PORT ACCESS SITE PRIOR TO ACCESS 30 g 2   • loperamide (IMODIUM) 1 MG/5ML Liquid Take 2 mg by mouth 4 times a day as needed for Diarrhea.     • ondansetron (ZOFRAN) 4 MG Tab tablet      • cyanocobalamin (VITAMIN B-12) 500 MCG Tab Take 500 mcg by mouth every day.     • Multiple Vitamins-Minerals (CENTRUM SILVER PO) Take  by mouth.     • Cholecalciferol (VITAMIN D3) 400 UNITS CAPS Take 1 Cap by mouth every day.     • loratadine (CLARITIN) 10 MG TABS Take 10 mg by mouth every day. Indications: Hayfever       Current Facility-Administered Medications   Medication Dose Route Frequency Provider Last Rate Last Dose   • heparin pf injection 500 Units  500 Units Intracatheter PRN Ella De, A.P.N.   500 Units at 08/22/17 0856       Social History   Substance Use Topics   • Smoking status: Never Smoker   • Smokeless tobacco: Never Used   • Alcohol use No      Comment: rare       Past Medical History:   Diagnosis Date   • Pneumonia 05/2017    tx'd with antibx   • Chemotherapy-induced neutropenia (CMS-HCC) 9/28/2016   • Routine general medical examination at a health care facility 3/14/2016    3/14/16   • Renal insufficiency 3/14/2016   • Lightheadedness 9/17/2015   • Blood clotting disorder (CMS-HCC) 08/2015    bilat PE    • Cancer (CMS-HCC) 09/2012    rectal cancer,  Liver CA-2015   • Blood transfusion 1957   • Arrhythmia    • CATARACT      removed b/l   • Chickenpox    • Colon cancer (CMS-HCC)    • Dental disorder     dentures UPPERS AND LOWERS   • Fall    • [place] measles    • Hemorrhagic disorder (CMS-HCC)     on Xarelto    • Hypercholesteremia    • Hypertension     no meds currently    • Ileostomy in place (CMS-HCC)    • Indigestion    • Influenza    • Mumps    • Obstruction     ileostomy   • Other specified symptom associated with female genital organs     HYSTERECTOMY 1993   • Pulmonary embolism (CMS-HCC)    • 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)    • Seasonal allergies    • Tonsillitis        Past Surgical History:   Procedure Laterality Date   • COLONOSCOPY WITH BIOPSY  8/23/2015    Procedure: COLONOSCOPY WITH BIOPSY;  Surgeon: Wilbur Glasgow M.D.;  Location: ENDOSCOPY [facility];  Service:    • HEPATIC ABLATION LAPAROSCOPIC  7/6/2015    Procedure: HEPATIC ABLATION LAPAROSCOPIC.;  Surgeon: Kit West M.D.;  Location: SURGERY [facility];  Service:    • CATH PLACEMENT Left 7/6/2015    Procedure: CATH PLACEMENT CEPHALIC POWERPORT;  Surgeon: Kit West M.D.;  Location: SURGERY [facility];  Service:    • FISTULA IN ANO REPAIR  1/28/2015    Performed by Kolton Montgomery M.D. at [facility]   • PERINEAL PROCEDURE  1/28/2015    Performed by Kolton Montgomery M.D. at [facility]   • FISTULA IN ANO REPAIR  10/15/2014    Performed by Kolton Montgomery M.D. at [facility]   • PERINEAL PROCEDURE  10/15/2014    Performed by Kolton Montgomery M.D. at [facility]   • IRRIGATION & DEBRIDEMENT GENERAL  2/19/2014    Performed by Kolton Montgomery M.D. at [facility]   • FLAP GRAFT  2/19/2014    Performed by Kolton Montgomery M.D. at [facility]   • PERINEAL PROCEDURE  12/18/2013    Performed by Kolton Montgomery M.D. at [facility]   • MYOCUTANEOUS FLAP  12/18/2013    Performed by Kolton Montgomery M.D. at [facility]   • IRRIGATION &  DEBRIDEMENT GENERAL  10/23/2013    Performed by Kolton Montgomery M.D. at SURGERY [facility]   • FISTULA IN ANO REPAIR  9/4/2013    Performed by Kolton Montgomery M.D. at SURGERY [facility]   • FLAP ROTATION  9/4/2013    Performed by Kolton Montgomery M.D. at [facility]   • RECOVERY  8/26/2013    Performed by Cath-Recovery Surgery at [facility] SAME DAY [facility]   • PROCTOSCOPY  7/17/2013    Performed by Kolton Montgomery M.D. at SURGERY [facility]   • BIOPSY GENERAL  7/17/2013    Performed by Kolton Montgomery M.D. at [facility]   • SIGMOIDOSCOPY  2/27/2013    Performed by Kolton Montgomery M.D. at SURGERY [facility]   • FISTULA IN ANO REPAIR  2/27/2013    Performed by Kolton Montgomery M.D. at SURGERY [facility]   • LAPAROSCOPY  10/8/2012    Performed by Kolton Montgomery M.D. at SURGERY [facility]   • ILEO LOOP DIVERSION  10/8/2012    Performed by Kolton Montgomery M.D. at SURGERY [facility]   • COLECTOMY  9/26/2012    Performed by Kolton Montgomery M.D. at SURGERY [facility]   • COLONOSCOPY FLEX W/ENDO US  9/20/2012    Performed by Harshil Bolton M.D. at [facility]   • OTHER  2009    left eye torn retina repair   • CATARACT EXTRACTION WITH IOL  2004    bilateral   • ABDOMINAL HYSTERECTOMY TOTAL  1983   • CYST EXCISION  1972    ovarian   • COLON RESECTION     • EYE SURGERY      cataracts   • HYSTERECTOMY LAPAROSCOPY     • PB REMV 2ND CATARACT,CORN-SCLER SECTN     • TONSILLECTOMY         Allergies:  Codeine; Oxaliplatin; Pcn [penicillins]; Sulfa drugs; and Tape    Family History   Problem Relation Age of Onset   • Heart Disease Mother    • Heart Failure Mother    • Hypertension Mother    • Hyperlipidemia Mother    • Cancer Mother    • Cancer Maternal Aunt 60     breast ca   • Lung Disease Brother      COPD/Emphysema/Smoker   • Cancer Brother      prostate cancer   • Alcohol/Drug Brother      Alcohol   • Kidney Disease Father      renal failure       Physical  "Examination    Vitals:    10/13/17 1136   Height: 1.651 m (5' 5\")   Weight: 78.5 kg (173 lb)   Weight % change since last entry.: 0 %   BP: 134/82   Pulse: (!) 105   BMI (Calculated): 28.79   Resp: 16   Temp: 36.5 °C (97.7 °F)       Physical Exam:  Constitutional:  Well developed and well nourished.  Head:  Normocephalic and atraumatic.  Nose:  Nose normal.  Mouth/Throat:  Oropharynx is clear and moist, no lesions.    Neck:  Normal range of motion.  Supple.  No JVD.  Cardiovascular:  Normal rate, regular rhythm, normal heart sounds. No edema  Pulmonary/Chest: No wheezing, rales or rhonchi.  Respiratory effort non labored  Musculoskeletal.  No muscular atrophy.  Lymphadenopathy:  No cervical or supraclavicular adenopathy  Neurological:  Alert and oriented.  Cranial nerves intact.  No focal deficits  Skin:  No rashes or ulcers.  Psyciatric:  Normal mood and affect.      Assessment and Plan:  1. COPD, moderate (CMS-HCC)  The patient has evidence of emphysema and it appears to be moderate on the CT scan of the chest. I believe this is most likely the cause of her dyspnea on exertion although I also believe that she has some significant deconditioning related to her recent treatments for colon cancer. I have started the patient on Stiolto inhaler and an albuterol inhaler. I would like her to start an exercise program. We will plan to see her back in 3 months. If she has any new problems in the meantime she will let us know.        Followup Return in about 3 months (around 1/13/2018) for follow up with the pulmonary physician, follow up visit with APRN.  "

## 2017-10-17 ENCOUNTER — OUTPATIENT INFUSION SERVICES (OUTPATIENT)
Dept: ONCOLOGY | Facility: MEDICAL CENTER | Age: 77
End: 2017-10-17
Attending: COLON & RECTAL SURGERY
Payer: MEDICARE

## 2017-10-17 VITALS
HEIGHT: 65 IN | WEIGHT: 169.75 LBS | TEMPERATURE: 97.1 F | OXYGEN SATURATION: 98 % | DIASTOLIC BLOOD PRESSURE: 67 MMHG | BODY MASS INDEX: 28.28 KG/M2 | RESPIRATION RATE: 18 BRPM | HEART RATE: 110 BPM | SYSTOLIC BLOOD PRESSURE: 134 MMHG

## 2017-10-17 DIAGNOSIS — C20 RECTAL CANCER (HCC): ICD-10-CM

## 2017-10-17 DIAGNOSIS — R94.4 DECREASED GFR: ICD-10-CM

## 2017-10-17 LAB
ALBUMIN SERPL BCP-MCNC: 3.4 G/DL (ref 3.2–4.9)
ALBUMIN/GLOB SERPL: 1.3 G/DL
ALP SERPL-CCNC: 182 U/L (ref 30–99)
ALT SERPL-CCNC: 28 U/L (ref 2–50)
ANION GAP SERPL CALC-SCNC: 9 MMOL/L (ref 0–11.9)
AST SERPL-CCNC: 37 U/L (ref 12–45)
BASOPHILS # BLD AUTO: 1.5 % (ref 0–1.8)
BASOPHILS # BLD: 0.04 K/UL (ref 0–0.12)
BILIRUB SERPL-MCNC: 0.8 MG/DL (ref 0.1–1.5)
BUN SERPL-MCNC: 17 MG/DL (ref 8–22)
CALCIUM SERPL-MCNC: 9.3 MG/DL (ref 8.5–10.5)
CEA SERPL-MCNC: 1.1 NG/ML (ref 0–3)
CHLORIDE SERPL-SCNC: 109 MMOL/L (ref 96–112)
CO2 SERPL-SCNC: 19 MMOL/L (ref 20–33)
CREAT SERPL-MCNC: 0.92 MG/DL (ref 0.5–1.4)
EOSINOPHIL # BLD AUTO: 0.12 K/UL (ref 0–0.51)
EOSINOPHIL NFR BLD: 4.4 % (ref 0–6.9)
ERYTHROCYTE [DISTWIDTH] IN BLOOD BY AUTOMATED COUNT: 63.9 FL (ref 35.9–50)
GFR SERPL CREATININE-BSD FRML MDRD: 59 ML/MIN/1.73 M 2
GLOBULIN SER CALC-MCNC: 2.7 G/DL (ref 1.9–3.5)
GLUCOSE SERPL-MCNC: 144 MG/DL (ref 65–99)
HCT VFR BLD AUTO: 38.9 % (ref 37–47)
HGB BLD-MCNC: 12.7 G/DL (ref 12–16)
IMM GRANULOCYTES # BLD AUTO: 0.01 K/UL (ref 0–0.11)
IMM GRANULOCYTES NFR BLD AUTO: 0.4 % (ref 0–0.9)
LYMPHOCYTES # BLD AUTO: 0.76 K/UL (ref 1–4.8)
LYMPHOCYTES NFR BLD: 28 % (ref 22–41)
MCH RBC QN AUTO: 32.6 PG (ref 27–33)
MCHC RBC AUTO-ENTMCNC: 32.6 G/DL (ref 33.6–35)
MCV RBC AUTO: 100 FL (ref 81.4–97.8)
MONOCYTES # BLD AUTO: 0.44 K/UL (ref 0–0.85)
MONOCYTES NFR BLD AUTO: 16.2 % (ref 0–13.4)
NEUTROPHILS # BLD AUTO: 1.34 K/UL (ref 2–7.15)
NEUTROPHILS NFR BLD: 49.5 % (ref 44–72)
NRBC # BLD AUTO: 0 K/UL
NRBC BLD AUTO-RTO: 0 /100 WBC
PLATELET # BLD AUTO: 141 K/UL (ref 164–446)
PMV BLD AUTO: 9.4 FL (ref 9–12.9)
POTASSIUM SERPL-SCNC: 3.6 MMOL/L (ref 3.6–5.5)
PROT SERPL-MCNC: 6.1 G/DL (ref 6–8.2)
RBC # BLD AUTO: 3.89 M/UL (ref 4.2–5.4)
SODIUM SERPL-SCNC: 137 MMOL/L (ref 135–145)
WBC # BLD AUTO: 2.7 K/UL (ref 4.8–10.8)

## 2017-10-17 PROCEDURE — 96367 TX/PROPH/DG ADDL SEQ IV INF: CPT

## 2017-10-17 PROCEDURE — A4212 NON CORING NEEDLE OR STYLET: HCPCS

## 2017-10-17 PROCEDURE — 96375 TX/PRO/DX INJ NEW DRUG ADDON: CPT

## 2017-10-17 PROCEDURE — 700105 HCHG RX REV CODE 258: Performed by: INTERNAL MEDICINE

## 2017-10-17 PROCEDURE — 82378 CARCINOEMBRYONIC ANTIGEN: CPT

## 2017-10-17 PROCEDURE — G0498 CHEMO EXTEND IV INFUS W/PUMP: HCPCS

## 2017-10-17 PROCEDURE — 700111 HCHG RX REV CODE 636 W/ 250 OVERRIDE (IP): Mod: JW

## 2017-10-17 PROCEDURE — 700111 HCHG RX REV CODE 636 W/ 250 OVERRIDE (IP): Performed by: INTERNAL MEDICINE

## 2017-10-17 PROCEDURE — 96409 CHEMO IV PUSH SNGL DRUG: CPT

## 2017-10-17 PROCEDURE — 85025 COMPLETE CBC W/AUTO DIFF WBC: CPT

## 2017-10-17 PROCEDURE — 96365 THER/PROPH/DIAG IV INF INIT: CPT

## 2017-10-17 PROCEDURE — 96411 CHEMO IV PUSH ADDL DRUG: CPT

## 2017-10-17 PROCEDURE — 80053 COMPREHEN METABOLIC PANEL: CPT

## 2017-10-17 RX ORDER — ONDANSETRON 2 MG/ML
4 INJECTION INTRAMUSCULAR; INTRAVENOUS
Status: CANCELLED | OUTPATIENT
Start: 2017-10-17

## 2017-10-17 RX ORDER — LIDOCAINE HYDROCHLORIDE 10 MG/ML
INJECTION, SOLUTION INFILTRATION; PERINEURAL
Status: COMPLETED
Start: 2017-10-17 | End: 2017-10-17

## 2017-10-17 RX ORDER — DEXAMETHASONE SODIUM PHOSPHATE 4 MG/ML
8 INJECTION, SOLUTION INTRA-ARTICULAR; INTRALESIONAL; INTRAMUSCULAR; INTRAVENOUS; SOFT TISSUE ONCE
Status: COMPLETED | OUTPATIENT
Start: 2017-10-17 | End: 2017-10-17

## 2017-10-17 RX ORDER — PROCHLORPERAZINE MALEATE 10 MG
10 TABLET ORAL EVERY 6 HOURS PRN
Status: CANCELLED | OUTPATIENT
Start: 2017-10-17

## 2017-10-17 RX ORDER — LIDOCAINE HYDROCHLORIDE 10 MG/ML
10 INJECTION, SOLUTION INFILTRATION; PERINEURAL ONCE
Status: CANCELLED | OUTPATIENT
Start: 2017-10-17 | End: 2017-10-17

## 2017-10-17 RX ORDER — DEXAMETHASONE SODIUM PHOSPHATE 4 MG/ML
8 INJECTION, SOLUTION INTRA-ARTICULAR; INTRALESIONAL; INTRAMUSCULAR; INTRAVENOUS; SOFT TISSUE ONCE
Status: CANCELLED | OUTPATIENT
Start: 2017-10-17 | End: 2017-10-17

## 2017-10-17 RX ORDER — DEXTROSE MONOHYDRATE 50 MG/ML
INJECTION, SOLUTION INTRAVENOUS CONTINUOUS
Status: CANCELLED | OUTPATIENT
Start: 2017-10-17

## 2017-10-17 RX ORDER — ONDANSETRON 8 MG/1
8 TABLET, ORALLY DISINTEGRATING ORAL
Status: CANCELLED | OUTPATIENT
Start: 2017-10-17

## 2017-10-17 RX ADMIN — LEUCOVORIN CALCIUM 752 MG: 350 INJECTION, POWDER, LYOPHILIZED, FOR SOLUTION INTRAMUSCULAR; INTRAVENOUS at 12:36

## 2017-10-17 RX ADMIN — DEXAMETHASONE SODIUM PHOSPHATE 8 MG: 4 INJECTION, SOLUTION INTRAMUSCULAR; INTRAVENOUS at 12:06

## 2017-10-17 RX ADMIN — FLUOROURACIL 3610 MG: 50 INJECTION, SOLUTION INTRAVENOUS at 14:01

## 2017-10-17 RX ADMIN — FLUOROURACIL 600 MG: 50 INJECTION, SOLUTION INTRAVENOUS at 13:51

## 2017-10-17 ASSESSMENT — PAIN SCALES - GENERAL: PAINLEVEL: NO PAIN

## 2017-10-17 NOTE — PROGRESS NOTES
Chemotherapy Verification - PRIMARY RN      Height = 166cm  Weight = 77kg  BSA = 1.88m2       Medication: Leucovorin  Dose: 400mg/m2  Calculated Dose: 752mg                             (In mg/m2, AUC, mg/kg)     Medication: Adrucil Push  Dose: 320mg/m2  Calculated Dose: 601.6mg                             (In mg/m2, AUC, mg/kg)    Medication:  Adrucil CADD pump  Dose: 1920mg/m2  Calculated Dose: 3609.6mg                             (In mg/m2, AUC, mg/kg)    I confirm this process was performed independently with the BSA and all final chemotherapy dosing calculations congruent.  Any discrepancies of 5% or greater have been addressed with the chemotherapy pharmacist. The resolution of the discrepancy has been documented in the EPIC progress notes.

## 2017-10-17 NOTE — PROGRESS NOTES
"Pharmacy Chemotherapy Verification Note:    Patient Name: Karlene Walters      Dx: Colon CA      Protocol: 5-FU+Leucovorin        *Dosing Reference*  Leucovorin 400 mg/m² IV over 2 hours on Day 1, followed by  Fluorouracil 400 mg/m²  IVP on Day 1, followed by    - Dose reduced by 20% to 320 mg/m² starting with Cycle 18 ([place] #11) on 7/5/16 due to neutropenia    Fluorouracil 1200 mg/m²  IV continuous infusion daily on Days 1 and 2 (2400 mg/m² IV over 46-48 hours) -    - Infusion Dose reduced by 20% starting with Cycle 18 ([place] #11) on 7/5/16 due to neutropenia   -20% reduction (1920 mg/m2) to be continued starting C28 per C Alsop APRN   14 day cycles for 12 cycles (adjuvant) or until disease progression or unacceptable toxicity    NCCN Guidelines for Colon Cancer. V.2.2015.  Jay GROVER, et al. Eur J Cancer.1999;35(9):1343-7.      Allergies:  Codeine; Oxaliplatin; Pcn; Sulfa drugs; and Tape     /67   Pulse (!) 110   Temp 36.2 °C (97.1 °F)   Resp 18   Ht 1.66 m (5' 5.35\")   Wt 77 kg (169 lb 12.1 oz)   SpO2 98%   BMI 27.94 kg/m²  Body surface area is 1.88 meters squared.    10/17/17:  ANC~ 1340 (OK if > 1000)  Plt = 141 k Hgb = 12.7  SCr = 0.92 mg/dL CrCl ~ 62 mL/min  LFT = WNL except AP = 182  TBili = 0.8     Drug Order   (Drug name, dose, route, IV Fluid & volume, frequency, number of doses) Cycle: 46   Previous treatment: C45 = 10/3/2017     Medication = Leucovorin (Wellcovorin)  Base Dose = 400 mg/m²  Calc Dose: Base Dose x 1.88 m² = 752 mg  Final Dose = 752 mg  Route = IV  Fluid & Volume = D5W 250 mL  Admin Duration = Over 2 hours   Day 1 only       <5% difference, ok to treat with final dose   Medication = Fluorouracil (5-FU)   Base Dose = 320 mg/m²  Calc Dose: Base Dose x 1.88 m² = 602 mg  Final Dose = 600 mg  Route = IVP  Fluid & Volume = 12 mL in syringe  Conc = 50 mg/mL  Admin Duration = Over 5 minutes   Day 1 only        <5% difference, ok to treat with final dose   Medication = Fluorouracil " (5-FU)   Base Dose = 1920 mg/m²/dose  Calc Dose:Base Dose x 1.88 m² = 3610 mg  Final Dose = 3610 mg   Route = CIVI  Fluid & Volume = CADD pump 72.2 mL (+ 3 mL overfill = 75.2 mL)  Conc = 50 mg/mL  Admin Duration = Over 46 hours  Rate = 1.6 mL/hour          <5% difference, ok to treat with final dose     By my signature below, I confirm this process was performed independently with the BSA and all final chemotherapy dosing calculations congruent. I have reviewed the above chemotherapy order and that my calculation of the final dose and BSA (when applicable) corroborate those calculations of the  pharmacist.     Norris Mitchell, PharmD

## 2017-10-17 NOTE — PROGRESS NOTES
Chemotherapy Verification - SECONDARY RN       Height = 166 cm  Weight = 77 kg  BSA = 1.88 m2       Medication: Fluorouracil  Dose: 320 mg/m2  Calculated Dose: 601.6 mg < 5% difference                             (In mg/m2, AUC, mg/kg)     Medication: Fluorouracil Dose: 1920 mg/m2  Calculated Dose: 3609.6 mg    (in Mg/m2, AUC, Mg/kg)      I confirm that this process was performed independently.

## 2017-10-17 NOTE — PROGRESS NOTES
"Pharmacy Chemotherapy Verification    Patient Name: Karlene Walters      Dx: Colon CA    Cycle:  46 Previous treatment: C45 = 10/03/17     Protocol: 5 FU/Leucovorin    Leucovorin 400 mg/m² IV over 2 hours on Day 1, followed by  Fluorouracil 400 mg/m²  IVP on Day 1, followed by    - Dose reduced by 20% to 320 mg/m² starting with Cycle 18 ([place] #11) on 7/5/16 due to neutropenia    Fluorouracil 1200 mg/m²  IV continuous infusion daily on Days 1 and 2 (2400 mg/m² IV over 46-48 hours) -    - Infusion Dose reduced by 20% starting with Cycle 18 ([place] #11) on 7/5/16 due to neutropenia   - 20% reduction (1920 mg/m²) to be continued starting C28 per Alsop APRN   14 day cycles for 12 cycles (adjuvant) or until disease progression or unacceptable toxicity    NCCN Guidelines for Colon Cancer. V.2.2015.  Jay T, et al. Eur J Cancer.1999;35(9):1343-7.          Allergies: Codeine; Oxaliplatin; Pcn [penicillins]; Sulfa drugs; and Tape  /67   Pulse (!) 110   Temp 36.2 °C (97.1 °F)   Resp 18   Ht 1.66 m (5' 5.35\")   Wt 77 kg (169 lb 12.1 oz)   SpO2 98%   BMI 27.94 kg/m²    Body surface area is 1.88 meters squared.      Labs 10/17/17  ANC ~1300  Plt = 141k Hgb = 12.7  SCr = 0.92 mg/dL CrCl ~ 62 mL/min AST/ALT/AP = 37/28/182 TBili = 0.8 K = 3.6      Leucovorin (Wellcovorin) 400 mg/m² x 1.88 m² = 752 mg   <5% difference, OK to treat with final written dose = 752 mg IV    Fluorouracil (5-FU) 320 mg/m² x 1.88 m² = 601.6 mg   <5% difference, OK to treat with final written dose = 600 mg IV    Fluorouracil (5-FU) 1920 mg/m²/dose x 1.85 m² = 3610 mg   <5% difference, OK to treat with final written dose = 3610 mg CIVI    via CADD pump at 1.6 ml/hr x 46 hour      Lalit Andrade, PharmD      "

## 2017-10-17 NOTE — PROGRESS NOTES
Patient arrived for Day 1 Cycle 38 5FU  W/leukovorin.  Pt reports fatigue but no other concerns. Port accessed in sterile field, flushed with brisk blood return noted.  Labs drawn as ordered. Results reviewed and within parameters for treatment.  Pre-med given; leucovorin infused over 1 hour.  5FU push given; CADD pump connected and verified with second RN.  Pump running, return appt confirmed with patient.  Pt discharged home in good spirits under no apparent distress.

## 2017-10-19 ENCOUNTER — OUTPATIENT INFUSION SERVICES (OUTPATIENT)
Dept: ONCOLOGY | Facility: MEDICAL CENTER | Age: 77
End: 2017-10-19
Attending: INTERNAL MEDICINE
Payer: MEDICARE

## 2017-10-19 VITALS
TEMPERATURE: 98.3 F | HEIGHT: 65 IN | DIASTOLIC BLOOD PRESSURE: 79 MMHG | HEART RATE: 95 BPM | BODY MASS INDEX: 28.69 KG/M2 | RESPIRATION RATE: 18 BRPM | WEIGHT: 172.18 LBS | OXYGEN SATURATION: 98 % | SYSTOLIC BLOOD PRESSURE: 154 MMHG

## 2017-10-19 DIAGNOSIS — C20 RECTAL CANCER (HCC): ICD-10-CM

## 2017-10-19 DIAGNOSIS — R94.4 DECREASED GFR: ICD-10-CM

## 2017-10-19 PROCEDURE — 700111 HCHG RX REV CODE 636 W/ 250 OVERRIDE (IP)

## 2017-10-19 PROCEDURE — 96523 IRRIG DRUG DELIVERY DEVICE: CPT

## 2017-10-19 RX ADMIN — HEPARIN 500 UNITS: 100 SYRINGE at 12:45

## 2017-10-19 ASSESSMENT — PAIN SCALES - GENERAL: PAINLEVEL: NO PAIN

## 2017-10-20 RX ORDER — DEXTROSE MONOHYDRATE 50 MG/ML
INJECTION, SOLUTION INTRAVENOUS CONTINUOUS
Status: CANCELLED | OUTPATIENT
Start: 2017-10-31

## 2017-10-20 RX ORDER — LIDOCAINE HYDROCHLORIDE 10 MG/ML
10 INJECTION, SOLUTION INFILTRATION; PERINEURAL ONCE
Status: CANCELLED | OUTPATIENT
Start: 2017-10-31 | End: 2017-10-31

## 2017-10-20 RX ORDER — ONDANSETRON 8 MG/1
8 TABLET, ORALLY DISINTEGRATING ORAL
Status: CANCELLED | OUTPATIENT
Start: 2017-10-31

## 2017-10-20 RX ORDER — DEXAMETHASONE SODIUM PHOSPHATE 4 MG/ML
8 INJECTION, SOLUTION INTRA-ARTICULAR; INTRALESIONAL; INTRAMUSCULAR; INTRAVENOUS; SOFT TISSUE ONCE
Status: CANCELLED | OUTPATIENT
Start: 2017-10-31 | End: 2017-10-31

## 2017-10-20 RX ORDER — ONDANSETRON 2 MG/ML
4 INJECTION INTRAMUSCULAR; INTRAVENOUS
Status: CANCELLED | OUTPATIENT
Start: 2017-10-31

## 2017-10-20 RX ORDER — PROCHLORPERAZINE MALEATE 10 MG
10 TABLET ORAL EVERY 6 HOURS PRN
Status: CANCELLED | OUTPATIENT
Start: 2017-10-31

## 2017-10-20 NOTE — PROGRESS NOTES
Here for 5 FU pump disconnect after 46 hrs of continuous infusion. See chemotherapy flow sheet for volume / dose administration. Port flushed per protocol, site d-accessed, needle intact compression dressing applied. Patient discharge home to self in NAD. Next appointment confirmed.

## 2017-10-29 NOTE — PROGRESS NOTES
"Pharmacy Chemotherapy Verification    Patient Name: Karlene Walters      Dx: Colon CA    Cycle:  47 Previous treatment: C46 = 10/17/17      Protocol: 5 FU/Leucovorin    Leucovorin 400 mg/m² IV over 2 hours on Day 1, followed by  Leucovorin deleted from treatment plan starting with C47 ([place] #39) on 10/31/17 by Hayley LUCERO due to neutropenia  Fluorouracil 400 mg/m²  IVP on Day 1, followed by    - Dose reduced by 20% to 320 mg/m² starting with Cycle 18 ([place] #11) on 7/5/16 due to neutropenia    Bolus fluorouracil deleted from treatment plan starting with C47 ([place] #39) on 10/31/17 by Hayley LUCERO due to neutropenia   Fluorouracil 1200 mg/m²  IV continuous infusion daily on Days 1 and 2 (2400 mg/m² IV over 46-48 hours) -    - Infusion Dose reduced by 20% starting with Cycle 18 ([place] #11) on 7/5/16 due to neutropenia   - 20% reduction (1920 mg/m²) to be continued starting C28 per Alsop APRN   14 day cycles for 12 cycles (adjuvant) or until disease progression or unacceptable toxicity    NCCN Guidelines for Colon Cancer. V.2.2015.  Jay T, et al. Eur J Cancer.1999;35(9):1343-7.          Allergies: Codeine; Oxaliplatin; Pcn [penicillins]; Sulfa drugs; and Tape  Ht 1.66 m (5' 5.35\")   Wt 78 kg (171 lb 15.3 oz)   BMI 28.31 kg/m²  Body surface area is 1.9 meters squared.    Labs 10/31/17  ANC ~1270 OK to treat with continuous infusion 5-FU only per Hayley LUCERO orders Plt = 146k Hgb = 12.4    10/17/17: SCr = 0.92 mg/dL CrCl ~ 63 mL/min   AST/ALT/AP = 37/28/182 TBili = 0.8 K = 3.6      Fluorouracil (5-FU) 1920 mg/m²/dose x 1.90 m² = 3648 mg   <5% difference, OK to treat with final written dose = 3650 mg CIVI    via CADD pump at 1.6 ml/hr x 46 hour    Norris Mitchell PharmD    "

## 2017-10-30 DIAGNOSIS — R10.11 RIGHT UPPER QUADRANT ABDOMINAL PAIN: ICD-10-CM

## 2017-10-30 DIAGNOSIS — C20 RECTAL CANCER (HCC): ICD-10-CM

## 2017-10-30 RX ORDER — OXYCODONE HYDROCHLORIDE 5 MG/1
5 TABLET ORAL EVERY 4 HOURS PRN
Qty: 30 TAB | Refills: 0 | Status: SHIPPED | OUTPATIENT
Start: 2017-10-30 | End: 2018-06-26 | Stop reason: SDUPTHER

## 2017-10-31 ENCOUNTER — OUTPATIENT INFUSION SERVICES (OUTPATIENT)
Dept: ONCOLOGY | Facility: MEDICAL CENTER | Age: 77
End: 2017-10-31
Attending: NURSE PRACTITIONER
Payer: MEDICARE

## 2017-10-31 ENCOUNTER — OFFICE VISIT (OUTPATIENT)
Dept: HEMATOLOGY ONCOLOGY | Facility: MEDICAL CENTER | Age: 77
End: 2017-10-31
Payer: MEDICARE

## 2017-10-31 ENCOUNTER — OUTPATIENT INFUSION SERVICES (OUTPATIENT)
Dept: ONCOLOGY | Facility: MEDICAL CENTER | Age: 77
End: 2017-10-31
Attending: INTERNAL MEDICINE
Payer: MEDICARE

## 2017-10-31 VITALS
HEART RATE: 90 BPM | DIASTOLIC BLOOD PRESSURE: 79 MMHG | RESPIRATION RATE: 18 BRPM | HEIGHT: 65 IN | SYSTOLIC BLOOD PRESSURE: 135 MMHG | BODY MASS INDEX: 28.65 KG/M2 | OXYGEN SATURATION: 98 % | WEIGHT: 171.96 LBS | TEMPERATURE: 97 F

## 2017-10-31 VITALS — WEIGHT: 171.96 LBS | BODY MASS INDEX: 28.65 KG/M2 | HEIGHT: 65 IN

## 2017-10-31 VITALS
BODY MASS INDEX: 28.47 KG/M2 | HEIGHT: 65 IN | TEMPERATURE: 97.3 F | OXYGEN SATURATION: 98 % | SYSTOLIC BLOOD PRESSURE: 126 MMHG | HEART RATE: 97 BPM | RESPIRATION RATE: 16 BRPM | WEIGHT: 170.86 LBS | DIASTOLIC BLOOD PRESSURE: 70 MMHG

## 2017-10-31 DIAGNOSIS — R94.4 DECREASED GFR: ICD-10-CM

## 2017-10-31 DIAGNOSIS — R06.02 SHORTNESS OF BREATH: ICD-10-CM

## 2017-10-31 DIAGNOSIS — C20 RECTAL CANCER (HCC): ICD-10-CM

## 2017-10-31 LAB
BASOPHILS # BLD AUTO: 0.8 % (ref 0–1.8)
BASOPHILS # BLD: 0.02 K/UL (ref 0–0.12)
EOSINOPHIL # BLD AUTO: 0.12 K/UL (ref 0–0.51)
EOSINOPHIL NFR BLD: 4.7 % (ref 0–6.9)
ERYTHROCYTE [DISTWIDTH] IN BLOOD BY AUTOMATED COUNT: 63.1 FL (ref 35.9–50)
HCT VFR BLD AUTO: 37.5 % (ref 37–47)
HGB BLD-MCNC: 12.4 G/DL (ref 12–16)
IMM GRANULOCYTES # BLD AUTO: 0.01 K/UL (ref 0–0.11)
IMM GRANULOCYTES NFR BLD AUTO: 0.4 % (ref 0–0.9)
LYMPHOCYTES # BLD AUTO: 0.67 K/UL (ref 1–4.8)
LYMPHOCYTES NFR BLD: 26.4 % (ref 22–41)
MCH RBC QN AUTO: 33.3 PG (ref 27–33)
MCHC RBC AUTO-ENTMCNC: 33.1 G/DL (ref 33.6–35)
MCV RBC AUTO: 100.8 FL (ref 81.4–97.8)
MONOCYTES # BLD AUTO: 0.45 K/UL (ref 0–0.85)
MONOCYTES NFR BLD AUTO: 17.7 % (ref 0–13.4)
NEUTROPHILS # BLD AUTO: 1.27 K/UL (ref 2–7.15)
NEUTROPHILS NFR BLD: 50 % (ref 44–72)
NRBC # BLD AUTO: 0 K/UL
NRBC BLD AUTO-RTO: 0 /100 WBC
PLATELET # BLD AUTO: 146 K/UL (ref 164–446)
PMV BLD AUTO: 10.2 FL (ref 9–12.9)
RBC # BLD AUTO: 3.72 M/UL (ref 4.2–5.4)
WBC # BLD AUTO: 2.5 K/UL (ref 4.8–10.8)

## 2017-10-31 PROCEDURE — 36591 DRAW BLOOD OFF VENOUS DEVICE: CPT

## 2017-10-31 PROCEDURE — 700111 HCHG RX REV CODE 636 W/ 250 OVERRIDE (IP)

## 2017-10-31 PROCEDURE — G0498 CHEMO EXTEND IV INFUS W/PUMP: HCPCS

## 2017-10-31 PROCEDURE — 96375 TX/PRO/DX INJ NEW DRUG ADDON: CPT

## 2017-10-31 PROCEDURE — A4212 NON CORING NEEDLE OR STYLET: HCPCS

## 2017-10-31 PROCEDURE — 700111 HCHG RX REV CODE 636 W/ 250 OVERRIDE (IP): Performed by: NURSE PRACTITIONER

## 2017-10-31 PROCEDURE — 85025 COMPLETE CBC W/AUTO DIFF WBC: CPT

## 2017-10-31 PROCEDURE — 99214 OFFICE O/P EST MOD 30 MIN: CPT | Performed by: INTERNAL MEDICINE

## 2017-10-31 RX ORDER — DEXAMETHASONE SODIUM PHOSPHATE 4 MG/ML
8 INJECTION, SOLUTION INTRA-ARTICULAR; INTRALESIONAL; INTRAMUSCULAR; INTRAVENOUS; SOFT TISSUE ONCE
Status: COMPLETED | OUTPATIENT
Start: 2017-10-31 | End: 2017-10-31

## 2017-10-31 RX ADMIN — FLUOROURACIL 3650 MG: 50 INJECTION, SOLUTION INTRAVENOUS at 12:43

## 2017-10-31 RX ADMIN — HEPARIN 500 UNITS: 100 SYRINGE at 09:31

## 2017-10-31 RX ADMIN — DEXAMETHASONE SODIUM PHOSPHATE 8 MG: 4 INJECTION, SOLUTION INTRAMUSCULAR; INTRAVENOUS at 12:26

## 2017-10-31 ASSESSMENT — PAIN SCALES - GENERAL
PAINLEVEL: 1=MINIMAL PAIN
PAINLEVEL: 2=MINIMAL-SLIGHT
PAINLEVEL: 1=MINIMAL PAIN

## 2017-10-31 NOTE — PROGRESS NOTES
Ely from Lab called with critical result of WBC = 2.5 at 0944. Critical lab result read back to Ely.   ANDREW Tabares/ Dr. Hardy notified of critical lab result at 0948.  Critical lab result read back by ANDREW Tabares/Dr. Hardy.

## 2017-10-31 NOTE — PROGRESS NOTES
"Pharmacy Chemotherapy Verification Note:    Patient Name: Karlene Walters      Dx: Colon CA        Protocol: 5-FU+Leucovorin        *Dosing Reference*   IV over 2 hours on Day 1, followed by   IVP on Day 1, followed by    - Dose reduced by 20% to 320 mg/m² starting with Cycle 18 ([place] #11) on 7/5/16 due to neutropenia    10/31/17: delete Leucovorin and 5-FU push d/t neutropenia per Dr. Hardy   Fluorouracil 1200 mg/m²  IV continuous infusion daily on Days 1 and 2 (2400 mg/m² IV over 46-48 hours) -    - Infusion Dose reduced by 20% starting with Cycle 18 ([place] #11) on 7/5/16 due to neutropenia   -20% reduction (1920 mg/m2) to be continued starting C28 per C Alsop APRN   14 day cycles for 12 cycles (adjuvant) or until disease progression or unacceptable toxicity    NCCN Guidelines for Colon Cancer. V.2.2015.  Jay T, et al. Eur J Cancer.1999;35(9):1343-7.      Allergies:  Codeine; Oxaliplatin; Pcn; Sulfa drugs; and Tape       Ht 1.66 m (5' 5.35\")   Wt 78 kg (171 lb 15.3 oz)   BMI 28.31 kg/m²  Body surface area is 1.9 meters squared.    10/31/17:  ANC~ 1300 (OK to proceed with 5-FU CIVI per Dr. Hardy)  Plt = 146k Hgb = 12.4    10/17/17   SCr = 0.92 mg/dL CrCl ~ 62 mL/min LFT = WNL except AP = 182  TBili = 0.8     Drug Order   (Drug name, dose, route, IV Fluid & volume, frequency, number of doses) Cycle 47   Previous treatment: C46 = 10/17/2017     Medication = Fluorouracil (5-FU)   Base Dose = 1920 mg/m²/dose  Calc Dose:Base Dose x 1.9 m² = 3648 mg  Final Dose = 3650 mg   Route = CIVI  Fluid & Volume = CADD pump 73 mL (+ 3 mL overfill = 76 mL)  Conc = 50 mg/mL  Admin Duration = Over 46 hours  Rate = 1.6 mL/hour          <5% difference, ok to treat with final dose     By my signature below, I confirm this process was performed independently with the BSA and all final chemotherapy dosing calculations congruent. I have reviewed the above chemotherapy order and that my calculation of the final dose and BSA (when " applicable) corroborate those calculations of the  pharmacist.     Lalit Andrade, PharmD

## 2017-10-31 NOTE — PROGRESS NOTES
Patient arrived for chemotherapy treatment; prior to arrival, scheduling called and stated MD was cancelling pt's Leucovorin.  Confirmed with Elaine RN, 5FU push and leucovorin cancelled; ok to treat with ANC 1.27.  Pt reports no changes or concerns; port previously accessed this am.  Blood return noted.  Pre-med given; 5FU CADD pump connected with second RN verification.  Pump running; pt placed in personal bag.  Confirmed appt Thursday for disconnection.  Pt discharged home with  in great spirits under no apparent distress.

## 2017-10-31 NOTE — PROGRESS NOTES
Date of visit: 10/31/2017  10:13 AM      Chief Complaint-  Metastatic Invasive moderately differenated rectal cancer to liver, for follow-up        History of presenting illness: Karlene Walters  is a 77 y.o. year old female who is here for follow-up of metastatic rectal carcinoma.     -Originally diagnosed with a rectal carcinoma in 2012 secondary to bleeding. s/pt laparoscopic resection with primary anastomosis and final pathology was consistent with well-differentiated adenocarcinoma with 0/30 nodes. She was staged pT2 N0. 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      8/2015 she was diagnosed with metastatic disease to the liver with a 2.4 cm mass as well as small pulmonary nodules and thyroid nodules. She underwent an ablative therapy to the liver. She was then started on Xelox. She had  allergic reaction to oxaliplatin and was continued on Xeloda. This eventually led to increased diarrhea and her regimen was changed to single agent 5-FU. She has been tolerating the therapy well with good responses on imaging.      3/2017- CT of the chest abdomen pelvis which has been stable and no evidence of disease. CT of the wrist was consistent with tenosynovitis and degenerative changes.     5/17-established care with me   EGD and colonoscopy  did not reveal any local recurrence. She had some gastritis.   - CEA has trended up to 13  -PET scan isolated hepatic lesion. Patchy basilar pulmonary opacities. Seen by Dr. West referred for radial embolization  6/17- Y90 Theraspheres.  Continued on 5-FU.  -CEA trending down/normalized     She is here for follow-up. Her CEA level has trended down to normal range. She has noticed worsening exertional dyspnea and was referred to pulmonary. Currently feels better with inhalers.    Past Medical History:      Past Medical History:   Diagnosis Date   •  Arrhythmia    • Blood clotting disorder (CMS-HCC) 08/2015    bilat PE    • Blood transfusion 1957   • Cancer (CMS-HCC) 09/2012    rectal cancer,  Liver CA-2015   • CATARACT     removed b/l   • Chemotherapy-induced neutropenia (CMS-HCC) 9/28/2016   • Chickenpox    • Colon cancer (CMS-HCC)    • Dental disorder     dentures UPPERS AND LOWERS   • Fall    • Tamazight measles    • Hemorrhagic disorder (CMS-HCC)     on Xarelto    • Hypercholesteremia    • Hypertension     no meds currently    • Ileostomy in place (CMS-[facility])    • Indigestion    • Influenza    • Lightheadedness 9/17/2015   • Mumps    • Obstruction     ileostomy   • Other specified symptom associated with female genital organs     HYSTERECTOMY 1993   • Pneumonia 05/2017    tx'd with antibx   • Pulmonary embolism (CMS-HCC)    • 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)    • Renal insufficiency 3/14/2016   • Routine general medical examination at a health care facility 3/14/2016    3/14/16   • Seasonal allergies    • Tonsillitis        Past surgical history:       Past Surgical History:   Procedure Laterality Date   • COLONOSCOPY WITH BIOPSY  8/23/2015    Procedure: COLONOSCOPY WITH BIOPSY;  Surgeon: Wilbur Glasgow M.D.;  Location: ENDOSCOPY [facility];  Service:    • HEPATIC ABLATION LAPAROSCOPIC  7/6/2015    Procedure: HEPATIC ABLATION LAPAROSCOPIC.;  Surgeon: Kit West M.D.;  Location: SURGERY [facility];  Service:    • CATH PLACEMENT Left 7/6/2015    Procedure: CATH PLACEMENT CEPHALIC POWERPORT;  Surgeon: Kit West M.D.;  Location: SURGERY [facility];  Service:    • FISTULA IN ANO REPAIR  1/28/2015    Performed by Kolton Montgomery M.D. at SURGERY [facility]   • PERINEAL PROCEDURE  1/28/2015    Performed by Kolton Montgomery M.D. at [facility]   • FISTULA IN ANO REPAIR  10/15/2014    Performed by Kolton Montgomery M.D. at [facility]   • PERINEAL PROCEDURE  10/15/2014    Performed by Kolton  LLOYD Montgomery M.D. at [facility]   • IRRIGATION & DEBRIDEMENT GENERAL  2/19/2014    Performed by Kolton Montgomery M.D. at SURGERY [facility]   • FLAP GRAFT  2/19/2014    Performed by Kolton Montgomery M.D. at [facility]   • PERINEAL PROCEDURE  12/18/2013    Performed by Kolton Montgomery M.D. at [facility]   • MYOCUTANEOUS FLAP  12/18/2013    Performed by Kolton Montgomery M.D. at [facility]   • IRRIGATION & DEBRIDEMENT GENERAL  10/23/2013    Performed by Kolton Montgomery M.D. at [facility]   • FISTULA IN ANO REPAIR  9/4/2013    Performed by Kolton Montgomery M.D. at [facility]   • FLAP ROTATION  9/4/2013    Performed by Kolton Montgomery M.D. at [facility]   • RECOVERY  8/26/2013    Performed by Cath-Recovery Surgery at [facility] SAME DAY [facility]   • PROCTOSCOPY  7/17/2013    Performed by Kolton Montgomery M.D. at SURGERY [facility]   • BIOPSY GENERAL  7/17/2013    Performed by Kolton Montgomery M.D. at [facility]   • SIGMOIDOSCOPY  2/27/2013    Performed by Kolton Montgomery M.D. at [facility]   • FISTULA IN ANO REPAIR  2/27/2013    Performed by Kolton Montgomery M.D. at [facility]   • LAPAROSCOPY  10/8/2012    Performed by Kolton Montgomery M.D. at [facility]   • ILEO LOOP DIVERSION  10/8/2012    Performed by Kolton Montgomery M.D. at [facility]   • COLECTOMY  9/26/2012    Performed by Kolton Montgomery M.D. at [facility]   • COLONOSCOPY FLEX W/ENDO US  9/20/2012    Performed by Harshil Bolton M.D. at [facility]   • OTHER  2009    left eye torn retina repair   • CATARACT EXTRACTION WITH IOL  2004    bilateral   • ABDOMINAL HYSTERECTOMY TOTAL  1983   • CYST EXCISION  1972    ovarian   • COLON RESECTION     • EYE SURGERY      cataracts   • HYSTERECTOMY LAPAROSCOPY     • PB REMV 2ND CATARACT,CORN-SCLER SECTN     • TONSILLECTOMY         Allergies:       Codeine; Oxaliplatin; Pcn  [penicillins]; Sulfa drugs; and Tape    Medications:         Current Outpatient Prescriptions   Medication Sig Dispense Refill   • oxycodone immediate-release (ROXICODONE) 5 MG Tab Take 1 Tab by mouth every four hours as needed for Severe Pain. 30 Tab 0   • Tiotropium Bromide-Olodaterol (STIOLTO RESPIMAT) 2.5-2.5 MCG/ACT Aero Soln Take 2 Puffs by mouth every day. 1 Inhaler 11   • albuterol (PROAIR HFA) 108 (90 Base) MCG/ACT Aero Soln inhalation aerosol Inhale 2 Puffs by mouth every four hours as needed for Shortness of Breath (wheezing). 1 Inhaler 11   • furosemide (LASIX) 40 MG Tab Take 1 Tab by mouth every day. 30 Tab 11   • ranitidine (ZANTAC) 150 MG Tab TAKE 1 TABLET 2 TIMES A DAY. 60 Tab 3   • XARELTO 20 MG Tab tablet TAKE 1 TABLET WITH DINNER. 30 Tab 6   • Esomeprazole Magnesium 10 MG Pack Take  by mouth.     • potassium chloride ER (KLOR-CON) 10 MEQ tablet Take 1 Tab by mouth every day. 30 Tab 3   • lidocaine-prilocaine (EMLA) 2.5-2.5 % Cream APPLY A THICK FILM OVER THE PORT ACCESS SITE PRIOR TO ACCESS 30 g 2   • loperamide (IMODIUM) 1 MG/5ML Liquid Take 2 mg by mouth 4 times a day as needed for Diarrhea.     • ondansetron (ZOFRAN) 4 MG Tab tablet      • cyanocobalamin (VITAMIN B-12) 500 MCG Tab Take 500 mcg by mouth every day.     • Multiple Vitamins-Minerals (CENTRUM SILVER PO) Take  by mouth.     • Cholecalciferol (VITAMIN D3) 400 UNITS CAPS Take 1 Cap by mouth every day.     • loratadine (CLARITIN) 10 MG TABS Take 10 mg by mouth every day. Indications: Hayfever       Current Facility-Administered Medications   Medication Dose Route Frequency Provider Last Rate Last Dose   • heparin pf injection 500 Units  500 Units Intracatheter PRN Ella De, A.P.N.   500 Units at 08/22/17 0856         Social History:     Social History     Social History   • Marital status:      Spouse name: N/A   • Number of children: N/A   • Years of education: N/A     Occupational History   • Not on file.     Social  "History Main Topics   • Smoking status: Never Smoker   • Smokeless tobacco: Never Used   • Alcohol use No      Comment: rare   • Drug use: No   • Sexual activity: No     Other Topics Concern   • Not on file     Social History Narrative   • No narrative on file       Family History:      Family History   Problem Relation Age of Onset   • Heart Disease Mother    • Heart Failure Mother    • Hypertension Mother    • Hyperlipidemia Mother    • Cancer Mother    • Cancer Maternal Aunt 60     breast ca   • Lung Disease Brother      COPD/Emphysema/Smoker   • Cancer Brother      prostate cancer   • Alcohol/Drug Brother      Alcohol   • Kidney Disease Father      renal failure       Review of Systems:  All other review of systems are negative except what was mentioned above in the HPI.    Constitutional: Negative for fever, chills, weight loss and malaise/fatigue.    HEENT: No new auditory or visual complaints. No sore throat and neck pain.     Respiratory: Negative for cough, sputum production, shortness of breath and wheezing.    Cardiovascular: Negative for chest pain, palpitations, orthopnea and leg swelling.    Gastrointestinal: Negative for heartburn, nausea, vomiting and abdominal pain.    Genitourinary: Negative for dysuria, hematuria    Musculoskeletal: No new arthralgias or myalgias   Skin: Negative for rash and itching.    Neurological: Negative for focal weakness and headaches.    Endo/Heme/Allergies: No abnormal bleed/bruise.    Psychiatric/Behavioral: No new depression/anxiety.    Physical Exam:  Vitals: /70   Pulse 97   Temp 36.3 °C (97.3 °F)   Resp 16   Ht 1.66 m (5' 5.35\")   Wt 77.5 kg (170 lb 13.7 oz)   SpO2 98%   BMI 28.13 kg/m²     General: Not in acute distress, alert and oriented x 3  HEENT: No pallor, icterus. Oropharynx clear.   Neck: Supple, no palpable masses.  Lymph nodes: No palpable cervical, supraclavicular, axillary or inguinal lymphadenopathy.    CVS: regular rate and rhythm, no " rubs or gallops  RESP: Clear to auscultate bilaterally, no wheezing or crackles.   ABD: Soft, non tender, non distended, positive bowel sounds, no palpable organomegaly  EXT: No edema or cyanosis  CNS: Alert and oriented x3, No focal deficits.  Skin- No rash      Labs:   Outpatient Infusion Services on 10/31/2017   Component Date Value Ref Range Status   • WBC 10/31/2017 2.5* 4.8 - 10.8 K/uL Final    Comment: This [place] [id number]  RN by Ely Duarte on 10 31  2017 at 0944, and has been read back.     • RBC 10/31/2017 3.72* 4.20 - 5.40 M/uL Final   • Hemoglobin 10/31/2017 12.4  12.0 - 16.0 g/dL Final   • Hematocrit 10/31/2017 37.5  37.0 - 47.0 % Final   • MCV 10/31/2017 100.8* 81.4 - 97.8 fL Final   • MCH 10/31/2017 33.3* 27.0 - 33.0 pg Final   • MCHC 10/31/2017 33.1* 33.6 - 35.0 g/dL Final   • RDW 10/31/2017 63.1* 35.9 - 50.0 fL Final   • Platelet Count 10/31/2017 146* 164 - 446 K/uL Final   • MPV 10/31/2017 10.2  9.0 - 12.9 fL Final   • Neutrophils-Polys 10/31/2017 50.00  44.00 - 72.00 % Final   • Lymphocytes 10/31/2017 26.40  22.00 - 41.00 % Final   • Monocytes 10/31/2017 17.70* 0.00 - 13.40 % Final   • Eosinophils 10/31/2017 4.70  0.00 - 6.90 % Final   • Basophils 10/31/2017 0.80  0.00 - 1.80 % Final   • Immature Granulocytes 10/31/2017 0.40  0.00 - 0.90 % Final   • Nucleated RBC 10/31/2017 0.00  /100 WBC Final   • Neutrophils (Absolute) 10/31/2017 1.27* 2.00 - 7.15 K/uL Final   • Lymphs (Absolute) 10/31/2017 0.67* 1.00 - 4.80 K/uL Final   • Monos (Absolute) 10/31/2017 0.45  0.00 - 0.85 K/uL Final   • Eos (Absolute) 10/31/2017 0.12  0.00 - 0.51 K/uL Final   • Baso (Absolute) 10/31/2017 0.02  0.00 - 0.12 K/uL Final   • Immature Granulocytes (abs) 10/31/2017 0.01  0.00 - 0.11 K/uL Final   • NRBC (Absolute) 10/31/2017 0.00  K/uL Final       Imaging-she had a recent high resolution CT of the chest-  Hyperexpanded and emphysematous lungs.  Left lower lobe atelectasis/pneumonitis similar to  8/24/2017 findings.  Posterior right upper lobe atelectasis/possible pneumonitis similar to 8/24/2017 findings.  No definite findings consistent with interstitial lung disease.  Dome of the liver lesion unchanged.      Assessment and Plan:    1.Metastatic rectal cancer, KRAS positive: She had a rather indolent course, but had isolated relapse in the liver. She is status post Y90 theraspheres with normalization of her CEA level. Follow-up CT shows a persistent mass in the upper dome of the liver. Probably, this is treated metastases as her tumor marker has normalized. She also had a recent CT chest which continued to show stable finding in the liver. She has some leukopenia from the chemotherapy. Informed her that, at this point we will switch her to just to 5-FU continuous infusion every 2 weeks as maintenance. We will consider switching her to Xeloda next year for convenience. I will hold off on further imaging at this point as she just had another CT scan. She has an appointment with interventional radiology in January at which time imaging can be ordered per the discretion of the radiologist. Else I am planning to obtain restaging CT scan in 6 months from now. We will monitor her CEA levels closely. She will return to clinic every 4 weeks with alternate chemotherapy doses.     2. Exertional dyspnea-CT of the chest, grossly unremarkable. She feels better with inhalers. She will follow up with pulmonary. Agree with exercise recommendation.    3 History of pulmonary embolus: He was diagnosed in 8/2015 and was initially treated with Coumadin then changed to Xarelto. She had completed 6 months of therapy but was continued on anticoagulation secondary to underlying malignancy  4 Anal repair and reconstruction: Patient was seen by Dr. Montgomery and has had multiple surgeries to this area. Further surgery has been deferred. She continues to follow-up with Dr. Montgomery periodically.    She agreed and verbalized her agreement and  understanding with the current plan.  I answered all questions and concerns she has at this time         Please note that this dictation was created using voice recognition software. I have made every reasonable attempt to correct obvious errors, but I expect that there are errors of grammar and possibly content that I did not discover before finalizing the note.      SIGNATURES:  Eric Hardy    CC:  Manan Quiñonez M.D.  No ref. provider found

## 2017-10-31 NOTE — PROGRESS NOTES
Chemotherapy Verification - SECONDARY RN       Height = 65.35in  Weight = 77.5kg  BSA = 1.88m2       Medication: Fluorouracil  Dose: 1920mg/m2 (80% of original dose of 2400mgm/2)  Calculated Dose: 4512mg                             (In mg/m2, AUC, mg/kg)              I confirm that this process was performed independently.

## 2017-10-31 NOTE — PROGRESS NOTES
Pt to infusion services ambulatory per self and accompanied by spouse.  Pt here for scheduled port access and lab draw prior to chemotherapy today.  Plan of care reviewed.  Pt verbalizes understanding.  Per treatment plan, CBC indicated today and CMP and CEA on even cycles.  Discussed with pharmacy, and per pharmacist, alicia Osborne for CBC only today.  Telephone call to MD office and spoke with ANDREW Tabares.  APRN notified that appears CBC and CMP are being drawn with each cycle and call placed to clarify labs needed today.  Per alicia MORALES for CBC only today.  Pt updated and verbalizes understanding.  Pt with left chest port with EMLA cream in place.  Port site cleansed and port accessed in sterile fashion.  Port flushes easily; +blood return verified.  CBC drawn per MD orders.  Port flushed with normal saline and heparin per policy.  Port remains accessed for treatment later today.  Pt released to self care and care of spouse in no apparent distress after completion of treatment, ambulatory.  Pt to f/u with MD at this time and return later this morning for scheduled chemotherapy.

## 2017-10-31 NOTE — PROGRESS NOTES
Chemotherapy Verification - PRIMARY RN      Height = 166cm  Weight = 78kg  BSA = 1.9m2       Medication: 5FU CADD pump  Dose: 1920mg/m2  Calculated Dose: 3648mg                             (In mg/m2, AUC, mg/kg)         I confirm this process was performed independently with the BSA and all final chemotherapy dosing calculations congruent.  Any discrepancies of 5% or greater have been addressed with the chemotherapy pharmacist. The resolution of the discrepancy has been documented in the EPIC progress notes.

## 2017-11-02 ENCOUNTER — OUTPATIENT INFUSION SERVICES (OUTPATIENT)
Dept: ONCOLOGY | Facility: MEDICAL CENTER | Age: 77
End: 2017-11-02
Attending: INTERNAL MEDICINE
Payer: MEDICARE

## 2017-11-02 VITALS
DIASTOLIC BLOOD PRESSURE: 69 MMHG | TEMPERATURE: 97.3 F | HEART RATE: 96 BPM | SYSTOLIC BLOOD PRESSURE: 114 MMHG | WEIGHT: 172.84 LBS | BODY MASS INDEX: 28.8 KG/M2 | RESPIRATION RATE: 18 BRPM | OXYGEN SATURATION: 99 % | HEIGHT: 65 IN

## 2017-11-02 PROCEDURE — 700111 HCHG RX REV CODE 636 W/ 250 OVERRIDE (IP)

## 2017-11-02 PROCEDURE — 96523 IRRIG DRUG DELIVERY DEVICE: CPT

## 2017-11-02 RX ADMIN — HEPARIN 500 UNITS: 100 SYRINGE at 15:25

## 2017-11-02 ASSESSMENT — PAIN SCALES - GENERAL: PAINLEVEL: 2=MINIMAL-SLIGHT

## 2017-11-02 NOTE — PROGRESS NOTES
"Patient presents ambulatory for 5 FU home pump disconnect.  Patient reports feeling \"fatigued\" today but otherwise okay.  Infusion pump reads 0 mL residual and 74.3 mL delivered.  Pump disconnected, port flushed per protocol, blood return noted, de accessed, gauze, and tape applied to site.  Patient to return 11/14/17, confirmed with patient.  Patient left ambulatory in no distress.  "

## 2017-11-13 RX ORDER — LIDOCAINE HYDROCHLORIDE 10 MG/ML
10 INJECTION, SOLUTION INFILTRATION; PERINEURAL ONCE
Status: CANCELLED | OUTPATIENT
Start: 2017-11-14 | End: 2017-11-14

## 2017-11-13 RX ORDER — ONDANSETRON 8 MG/1
8 TABLET, ORALLY DISINTEGRATING ORAL
Status: CANCELLED | OUTPATIENT
Start: 2017-11-14

## 2017-11-13 RX ORDER — ONDANSETRON 2 MG/ML
4 INJECTION INTRAMUSCULAR; INTRAVENOUS
Status: CANCELLED | OUTPATIENT
Start: 2017-11-14

## 2017-11-13 RX ORDER — DEXAMETHASONE SODIUM PHOSPHATE 4 MG/ML
8 INJECTION, SOLUTION INTRA-ARTICULAR; INTRALESIONAL; INTRAMUSCULAR; INTRAVENOUS; SOFT TISSUE ONCE
Status: CANCELLED | OUTPATIENT
Start: 2017-11-14 | End: 2017-11-14

## 2017-11-13 RX ORDER — PROCHLORPERAZINE MALEATE 10 MG
10 TABLET ORAL EVERY 6 HOURS PRN
Status: CANCELLED | OUTPATIENT
Start: 2017-11-14

## 2017-11-13 RX ORDER — DEXTROSE MONOHYDRATE 50 MG/ML
INJECTION, SOLUTION INTRAVENOUS CONTINUOUS
Status: CANCELLED | OUTPATIENT
Start: 2017-11-14

## 2017-11-14 ENCOUNTER — OUTPATIENT INFUSION SERVICES (OUTPATIENT)
Dept: ONCOLOGY | Facility: MEDICAL CENTER | Age: 77
End: 2017-11-14
Attending: INTERNAL MEDICINE
Payer: MEDICARE

## 2017-11-14 ENCOUNTER — OUTPATIENT INFUSION SERVICES (OUTPATIENT)
Dept: ONCOLOGY | Facility: MEDICAL CENTER | Age: 77
End: 2017-11-14
Attending: NURSE PRACTITIONER
Payer: MEDICARE

## 2017-11-14 VITALS
BODY MASS INDEX: 28.83 KG/M2 | RESPIRATION RATE: 18 BRPM | HEIGHT: 65 IN | WEIGHT: 173.06 LBS | HEART RATE: 88 BPM | OXYGEN SATURATION: 98 % | SYSTOLIC BLOOD PRESSURE: 128 MMHG | DIASTOLIC BLOOD PRESSURE: 63 MMHG | TEMPERATURE: 98.3 F

## 2017-11-14 VITALS
WEIGHT: 173.06 LBS | HEIGHT: 65 IN | OXYGEN SATURATION: 98 % | TEMPERATURE: 98.3 F | BODY MASS INDEX: 28.83 KG/M2 | SYSTOLIC BLOOD PRESSURE: 128 MMHG | HEART RATE: 88 BPM | DIASTOLIC BLOOD PRESSURE: 63 MMHG | RESPIRATION RATE: 18 BRPM

## 2017-11-14 DIAGNOSIS — C20 RECTAL CANCER (HCC): ICD-10-CM

## 2017-11-14 LAB
ALBUMIN SERPL BCP-MCNC: 3.5 G/DL (ref 3.2–4.9)
ALBUMIN/GLOB SERPL: 1.3 G/DL
ALP SERPL-CCNC: 180 U/L (ref 30–99)
ALT SERPL-CCNC: 28 U/L (ref 2–50)
ANION GAP SERPL CALC-SCNC: 8 MMOL/L (ref 0–11.9)
AST SERPL-CCNC: 39 U/L (ref 12–45)
BASOPHILS # BLD AUTO: 1.5 % (ref 0–1.8)
BASOPHILS # BLD: 0.05 K/UL (ref 0–0.12)
BILIRUB SERPL-MCNC: 0.8 MG/DL (ref 0.1–1.5)
BUN SERPL-MCNC: 15 MG/DL (ref 8–22)
CALCIUM SERPL-MCNC: 9.1 MG/DL (ref 8.5–10.5)
CEA SERPL-MCNC: 1.3 NG/ML (ref 0–3)
CHLORIDE SERPL-SCNC: 107 MMOL/L (ref 96–112)
CO2 SERPL-SCNC: 25 MMOL/L (ref 20–33)
CREAT SERPL-MCNC: 0.97 MG/DL (ref 0.5–1.4)
EOSINOPHIL # BLD AUTO: 0.11 K/UL (ref 0–0.51)
EOSINOPHIL NFR BLD: 3.3 % (ref 0–6.9)
ERYTHROCYTE [DISTWIDTH] IN BLOOD BY AUTOMATED COUNT: 60.1 FL (ref 35.9–50)
GFR SERPL CREATININE-BSD FRML MDRD: 56 ML/MIN/1.73 M 2
GLOBULIN SER CALC-MCNC: 2.6 G/DL (ref 1.9–3.5)
GLUCOSE SERPL-MCNC: 131 MG/DL (ref 65–99)
HCT VFR BLD AUTO: 38.7 % (ref 37–47)
HGB BLD-MCNC: 12.8 G/DL (ref 12–16)
IMM GRANULOCYTES # BLD AUTO: 0.01 K/UL (ref 0–0.11)
IMM GRANULOCYTES NFR BLD AUTO: 0.3 % (ref 0–0.9)
LYMPHOCYTES # BLD AUTO: 0.78 K/UL (ref 1–4.8)
LYMPHOCYTES NFR BLD: 23.4 % (ref 22–41)
MCH RBC QN AUTO: 33 PG (ref 27–33)
MCHC RBC AUTO-ENTMCNC: 33.1 G/DL (ref 33.6–35)
MCV RBC AUTO: 99.7 FL (ref 81.4–97.8)
MONOCYTES # BLD AUTO: 0.64 K/UL (ref 0–0.85)
MONOCYTES NFR BLD AUTO: 19.2 % (ref 0–13.4)
NEUTROPHILS # BLD AUTO: 1.75 K/UL (ref 2–7.15)
NEUTROPHILS NFR BLD: 52.3 % (ref 44–72)
NRBC # BLD AUTO: 0 K/UL
NRBC BLD AUTO-RTO: 0 /100 WBC
PLATELET # BLD AUTO: 160 K/UL (ref 164–446)
PMV BLD AUTO: 10.5 FL (ref 9–12.9)
POTASSIUM SERPL-SCNC: 3.6 MMOL/L (ref 3.6–5.5)
PROT SERPL-MCNC: 6.1 G/DL (ref 6–8.2)
RBC # BLD AUTO: 3.88 M/UL (ref 4.2–5.4)
SODIUM SERPL-SCNC: 140 MMOL/L (ref 135–145)
WBC # BLD AUTO: 3.3 K/UL (ref 4.8–10.8)

## 2017-11-14 PROCEDURE — 96374 THER/PROPH/DIAG INJ IV PUSH: CPT | Mod: XU

## 2017-11-14 PROCEDURE — 700111 HCHG RX REV CODE 636 W/ 250 OVERRIDE (IP): Performed by: NURSE PRACTITIONER

## 2017-11-14 PROCEDURE — 80053 COMPREHEN METABOLIC PANEL: CPT

## 2017-11-14 PROCEDURE — G0498 CHEMO EXTEND IV INFUS W/PUMP: HCPCS

## 2017-11-14 PROCEDURE — 96375 TX/PRO/DX INJ NEW DRUG ADDON: CPT

## 2017-11-14 PROCEDURE — A4212 NON CORING NEEDLE OR STYLET: HCPCS

## 2017-11-14 PROCEDURE — 82378 CARCINOEMBRYONIC ANTIGEN: CPT

## 2017-11-14 PROCEDURE — 85025 COMPLETE CBC W/AUTO DIFF WBC: CPT

## 2017-11-14 RX ORDER — DEXAMETHASONE SODIUM PHOSPHATE 4 MG/ML
8 INJECTION, SOLUTION INTRA-ARTICULAR; INTRALESIONAL; INTRAMUSCULAR; INTRAVENOUS; SOFT TISSUE ONCE
Status: COMPLETED | OUTPATIENT
Start: 2017-11-14 | End: 2017-11-14

## 2017-11-14 RX ADMIN — DEXAMETHASONE SODIUM PHOSPHATE 8 MG: 4 INJECTION, SOLUTION INTRAMUSCULAR; INTRAVENOUS at 11:16

## 2017-11-14 RX ADMIN — FLUOROURACIL 3650 MG: 50 INJECTION, SOLUTION INTRAVENOUS at 12:33

## 2017-11-14 ASSESSMENT — PAIN SCALES - GENERAL
PAINLEVEL: 2=MINIMAL-SLIGHT
PAINLEVEL: 2=MINIMAL-SLIGHT

## 2017-11-14 NOTE — PROGRESS NOTES
"Pharmacy Chemotherapy Verification    Patient Name: Karlene Walters      Dx: Colon CA    Cycle:  48   Previous treatment: 10/31/17    Protocol: 5 FU/Leucovorin      Leucovorin deleted from treatment plan starting with C47 ([facility] #39) on 10/31/17 by Hayley LUCERO due to neutropenia    - Dose reduced by 20% to 320 mg/m² starting with Cycle 18 ([facility] #11) on 7/5/16 due to neutropenia    Bolus fluorouracil deleted from treatment plan starting with C47 ([facility] #39) on 10/31/17 by Hayley LUCERO due to neutropenia   Fluorouracil 1200 mg/m²  IV continuous infusion daily on Days 1 and 2 (2400 mg/m² IV over 46-48 hours) -    - Infusion Dose reduced by 20% starting with Cycle 18 ([facility] #11) on 7/5/16 due to neutropenia   - 20% reduction (1920 mg/m²) to be continued starting C28 per Alsop APRN   14 day cycles for 12 cycles (adjuvant) or until disease progression or unacceptable toxicity    NCCN Guidelines for Colon Cancer. V.2.2015.  Jay T, et al. Eur J Cancer.1999;35(9):1343-7.          Allergies: Codeine; Oxaliplatin; Pcn [penicillins]; Sulfa drugs; and Tape  /63   Pulse 88   Temp 36.8 °C (98.3 °F)   Resp 18   Ht 1.66 m (5' 5.35\")   Wt 78.5 kg (173 lb 1 oz)   SpO2 98%   BMI 28.49 kg/m²  Body surface area is 1.9 meters squared.    ANC~ 1750 Plt = 160k   Hgb = 12.8     SCr = 0.97mg/dL CrCl ~ 60mL/min   LFT's = WNL, except AP = 180 TBili = 0.8     Fluorouracil (5-FU) 1920 mg/m²/dose x 1.9m² = 3648mg   <5% difference, OK to treat with final written dose = 3650mg CIVI    via CADD pump at 1.6ml/hr x 46 hour    DARIN Arroyo, Pharm.D.      "

## 2017-11-14 NOTE — PROGRESS NOTES
Patient presents for chemotherapy. Reviewed plan of care, patient verbalizes understanding. Instructed patient on importance of reporting symptoms to her MD. Pre-medications given as ordered. Patient connected to her home infusion pump (see chemotherapy flow sheet). Patient returns Thursday and released in no acute distress with her .

## 2017-11-14 NOTE — PROGRESS NOTES
Pt presents ambulatory with spouse for labs prior to chemotherapy today. Pt reports she will not see MD prior to each chemo treatment and is not scheduled to see MD today. Pt reports no adverse reactions or side effects from treatment. RN updated future appts  for lab draw prior to MD appt only, handout given to pt. Pt voiced concerns about navigating care with referral to surgeon, call placed to [place] RN navigator to assist pt with care coordination, she will come to see pt during chemotherapy today. Emla cream wiped from port site and port accessed with sterile technique and blood return noted, labs drawn and port flushed with saline per protocol. Site secured for return use. Pt left with spouse at  1022 and will return at 1100 for chemotherapy treatment.

## 2017-11-14 NOTE — PROGRESS NOTES
Chemotherapy Verification - PRIMARY RN      Height = 166 cm  Weight = 78.5 kg  BSA = 1.9m2       Medication: Adrucil  Dose: 1,920 mg/m2  Calculated Dose: 3,648 mg                             (In mg/m2, AUC, mg/kg)       I confirm this process was performed independently with the BSA and all final chemotherapy dosing calculations congruent.  Any discrepancies of 5% or greater have been addressed with the chemotherapy pharmacist. The resolution of the discrepancy has been documented in the EPIC progress notes.

## 2017-11-14 NOTE — PROGRESS NOTES
Chemotherapy Verification - SECONDARY RN       Height = 166 cm  Weight = 78.5 kg  BSA = 1.90 m2       Medication: Home infusion 5FU  Dose: 1920 mg/m2  Calculated Dose: 3652.9 mg                             (In mg/m2, AUC, mg/kg)     I confirm that this process was performed independently.

## 2017-11-14 NOTE — PROGRESS NOTES
"Pharmacy Chemotherapy Verification Note:    Patient Name: Karlene Walters      Dx: Colon CA        Protocol: 5-FU+Leucovorin        *Dosing Reference*   IV over 2 hours on Day 1, followed by   IVP on Day 1, followed by    - Dose reduced by 20% to 320 mg/m² starting with Cycle 18 ([facility] #11) on 7/5/16 due to neutropenia    10/31/17: delete Leucovorin and 5-FU push d/t neutropenia per Dr. Hardy   Fluorouracil 1200 mg/m²  IV continuous infusion daily on Days 1 and 2 (2400 mg/m² IV over 46-48 hours) -    - Infusion Dose reduced by 20% starting with Cycle 18 ([facility] #11) on 7/5/16 due to neutropenia   -20% reduction (1920 mg/m2) to be continued starting C28 per C Alsop APRN   14 day cycles for 12 cycles (adjuvant) or until disease progression or unacceptable toxicity    NCCN Guidelines for Colon Cancer. V.2.2015.  Jay T, et al. Eur J Cancer.1999;35(9):1343-7.      Allergies:  Codeine; Oxaliplatin; Pcn; Sulfa drugs; and Tape       /63   Pulse 88   Temp 36.8 °C (98.3 °F)   Resp 18   Ht 1.66 m (5' 5.35\")   Wt 78.5 kg (173 lb 1 oz)   SpO2 98%   BMI 28.49 kg/m²  Body surface area is 1.9 meters squared.    11/14/2017:  ANC~ 1750 Plt = 160k Hgb = 12.8/38.7  SCr = 0.97 mg/dL CrCl ~ 60 mL/min LFT = WNL except AP = 180  TBili = 0.8     Drug Order   (Drug name, dose, route, IV Fluid & volume, frequency, number of doses) Cycle 48   Previous treatment: C47 = 10/31/2017     Medication = Fluorouracil (5-FU)   Base Dose = 1920 mg/m²/dose  Calc Dose:Base Dose x 1.9 m² = 3648 mg  Final Dose = 3650 mg   Route = CIVI  Fluid & Volume = CADD pump 73 mL (+ 3 mL overfill = 76 mL)  Conc = 50 mg/mL  Admin Duration = Over 46 hours  Rate = 1.6 mL/hour          <5% difference, ok to treat with final dose     By my signature below, I confirm this process was performed independently with the BSA and all final chemotherapy dosing calculations congruent. I have reviewed the above chemotherapy order and that my calculation of the " final dose and BSA (when applicable) corroborate those calculations of the  pharmacist.     ANA Villatoro, PharmD

## 2017-11-15 ENCOUNTER — OFFICE VISIT (OUTPATIENT)
Dept: MEDICAL GROUP | Facility: MEDICAL CENTER | Age: 77
End: 2017-11-15
Payer: MEDICARE

## 2017-11-15 VITALS
BODY MASS INDEX: 29.16 KG/M2 | TEMPERATURE: 97.9 F | WEIGHT: 175 LBS | RESPIRATION RATE: 14 BRPM | SYSTOLIC BLOOD PRESSURE: 120 MMHG | DIASTOLIC BLOOD PRESSURE: 68 MMHG | HEART RATE: 82 BPM | OXYGEN SATURATION: 96 % | HEIGHT: 65 IN

## 2017-11-15 DIAGNOSIS — R73.9 HYPERGLYCEMIA: ICD-10-CM

## 2017-11-15 DIAGNOSIS — C20 RECTAL CANCER (HCC): ICD-10-CM

## 2017-11-15 DIAGNOSIS — Z71.84 TRAVEL ADVICE ENCOUNTER: ICD-10-CM

## 2017-11-15 DIAGNOSIS — R06.02 SHORTNESS OF BREATH: ICD-10-CM

## 2017-11-15 DIAGNOSIS — E86.0 DEHYDRATION: ICD-10-CM

## 2017-11-15 DIAGNOSIS — I10 ESSENTIAL HYPERTENSION: ICD-10-CM

## 2017-11-15 DIAGNOSIS — E78.5 HYPERLIPIDEMIA, UNSPECIFIED HYPERLIPIDEMIA TYPE: ICD-10-CM

## 2017-11-15 DIAGNOSIS — C78.7 SECONDARY MALIGNANT NEOPLASM OF LIVER (HCC): ICD-10-CM

## 2017-11-15 DIAGNOSIS — R05.3 CHRONIC COUGH: ICD-10-CM

## 2017-11-15 DIAGNOSIS — T45.1X5A CHEMOTHERAPY-INDUCED NEUTROPENIA (HCC): ICD-10-CM

## 2017-11-15 DIAGNOSIS — R74.8 ELEVATED ALKALINE PHOSPHATASE LEVEL: ICD-10-CM

## 2017-11-15 DIAGNOSIS — D70.1 CHEMOTHERAPY-INDUCED NEUTROPENIA (HCC): ICD-10-CM

## 2017-11-15 DIAGNOSIS — N28.9 RENAL INSUFFICIENCY: ICD-10-CM

## 2017-11-15 PROCEDURE — 99214 OFFICE O/P EST MOD 30 MIN: CPT | Performed by: INTERNAL MEDICINE

## 2017-11-16 ENCOUNTER — OUTPATIENT INFUSION SERVICES (OUTPATIENT)
Dept: ONCOLOGY | Facility: MEDICAL CENTER | Age: 77
End: 2017-11-16
Attending: INTERNAL MEDICINE
Payer: MEDICARE

## 2017-11-16 DIAGNOSIS — C20 RECTAL CANCER (HCC): ICD-10-CM

## 2017-11-16 PROCEDURE — 96523 IRRIG DRUG DELIVERY DEVICE: CPT

## 2017-11-16 PROCEDURE — 700111 HCHG RX REV CODE 636 W/ 250 OVERRIDE (IP)

## 2017-11-16 RX ADMIN — HEPARIN 500 UNITS: 100 SYRINGE at 13:17

## 2017-11-16 NOTE — ASSESSMENT & PLAN NOTE
White count is a little low at 3.3 with a fairly normal differential. She denies unusual infections or fevers or lymphadenopathy.

## 2017-11-16 NOTE — ASSESSMENT & PLAN NOTE
She and her  will be traveling through the [place] in January. We briefly reviewed vaccinations. We discussed good hand hygiene and foods to avoid. She was referred to Dr. Hardy and 2 the travel clinic to coordinate vaccinations with her immune system.

## 2017-11-16 NOTE — ASSESSMENT & PLAN NOTE
Dr. Warren diagnosed COPD. She is using inhalers. She does have thick mucus that seems to be causing the cough.

## 2017-11-16 NOTE — PROGRESS NOTES
Subjective:     Chief Complaint   Patient presents with   • Follow-Up     routine f/u     Karlene Walters is a 77 y.o. female here today forFollow-up of her various health problems. She and her  anticipate a cruise through the [place] in January.    Travel advice encounter  She and her  will be traveling through the [place] in January. We briefly reviewed vaccinations. We discussed good hand hygiene and foods to avoid. She was referred to Dr. Hardy and 2 the travel clinic to coordinate vaccinations with her immune system.    Shortness of breath  Dr. Warren diagnosed COPD. She is using inhalers. She does have thick mucus that seems to be causing the cough.    Secondary malignant neoplasm of liver (CMS-HCC)  She reports this is relatively stable for now.    Renal insufficiency  She has some renal insufficiency with GFR 56. She has trouble remaining well hydrated.    Rectal cancer  This is being followed closely by Dr. Hardy.CEA is 1.3.    Hypertension  Her hypertension is currently under good control with low-salt diet and furosemide.    Hyperglycemia  Blood sugars a little elevated at 131. She does eat some sweets. She has being getting some steroids she reports.    Elevated alkaline phosphatase level  Alkaline phosphatase remains consistently elevated at about 180. She does have a lesion on the dome of her liver. There is apparently no evidence of skeletal metastasis.    Dehydration  She has trouble remaining well hydrated. She does not drink a lot of fluids.    Chemotherapy-induced neutropenia  White count is a little low at 3.3 with a fairly normal differential. She denies unusual infections or fevers or lymphadenopathy.       Diagnoses of Essential hypertension, Rectal cancer (CMS-HCC), Hyperlipidemia, unspecified hyperlipidemia type, Renal insufficiency, Hyperglycemia, Chemotherapy-induced neutropenia (CMS-HCC), Elevated alkaline phosphatase level, Travel advice encounter, Chronic  cough, Shortness of breath, Secondary malignant neoplasm of liver (CMS-HCC), and Dehydration were pertinent to this visit.    Allergies: Codeine; Oxaliplatin; Pcn [penicillins]; Sulfa drugs; and Tape  Current medicines (including changes today)  Current Outpatient Prescriptions   Medication Sig Dispense Refill   • oxycodone immediate-release (ROXICODONE) 5 MG Tab Take 1 Tab by mouth every four hours as needed for Severe Pain. 30 Tab 0   • Tiotropium Bromide-Olodaterol (STIOLTO RESPIMAT) 2.5-2.5 MCG/ACT Aero Soln Take 2 Puffs by mouth every day. 1 Inhaler 11   • albuterol (PROAIR HFA) 108 (90 Base) MCG/ACT Aero Soln inhalation aerosol Inhale 2 Puffs by mouth every four hours as needed for Shortness of Breath (wheezing). 1 Inhaler 11   • furosemide (LASIX) 40 MG Tab Take 1 Tab by mouth every day. 30 Tab 11   • ranitidine (ZANTAC) 150 MG Tab TAKE 1 TABLET 2 TIMES A DAY. 60 Tab 3   • XARELTO 20 MG Tab tablet TAKE 1 TABLET WITH DINNER. 30 Tab 6   • Esomeprazole Magnesium 10 MG Pack Take  by mouth.     • potassium chloride ER (KLOR-CON) 10 MEQ tablet Take 1 Tab by mouth every day. 30 Tab 3   • lidocaine-prilocaine (EMLA) 2.5-2.5 % Cream APPLY A THICK FILM OVER THE PORT ACCESS SITE PRIOR TO ACCESS 30 g 2   • loperamide (IMODIUM) 1 MG/5ML Liquid Take 2 mg by mouth 4 times a day as needed for Diarrhea.     • ondansetron (ZOFRAN) 4 MG Tab tablet      • cyanocobalamin (VITAMIN B-12) 500 MCG Tab Take 500 mcg by mouth every day.     • Multiple Vitamins-Minerals (CENTRUM SILVER PO) Take  by mouth.     • Cholecalciferol (VITAMIN D3) 400 UNITS CAPS Take 1 Cap by mouth every day.     • loratadine (CLARITIN) 10 MG TABS Take 10 mg by mouth every day. Indications: Hayfever       Current Facility-Administered Medications   Medication Dose Route Frequency Provider Last Rate Last Dose   • heparin pf injection 500 Units  500 Units Intracatheter PRN Ella De, A.P.N.   500 Units at 08/22/17 0856       She  has a past medical history  "of Arrhythmia; Blood clotting disorder (CMS-[facility]) (08/2015); Blood transfusion (1957); Cancer (CMS-HCC) (09/2012); CATARACT; Chemotherapy-induced neutropenia (CMS-[facility]) (9/28/2016); Chickenpox; Colon cancer (CMS-HCC); Dental disorder; Elevated alkaline phosphatase level (11/15/2017); Fall; [place] measles; Hemorrhagic disorder (CMS-HCC); Hypercholesteremia; Hypertension; Ileostomy in place (CMS-HCC); Indigestion; Influenza; Lightheadedness (9/17/2015); Mumps; Obstruction; Other specified symptom associated with female genital organs; Pneumonia (05/2017); Pulmonary embolism (CMS-HCC); 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    ROS    Patient denies significant change in strength, weight or appetite.  No significant lightheadedness or headaches.  No change in vision, hearing, or swallowing.  No new dyspnea, coughing, chest pain, or palpitations. She does have a chronic cough and mild exertional dyspnea that is improved with inhalers. She has been followed for this by Dr. Warren.  No indigestion, abdominal pain, or change in bowel habits.  No change in urinating.  No new ankle swelling.       Objective:     PE:  /68   Pulse 82   Temp 36.6 °C (97.9 °F)   Resp 14   Ht 1.66 m (5' 5.35\")   Wt 79.4 kg (175 lb)   SpO2 96%   BMI 28.81 kg/m²    Neck is supple without significant lymphadenopathy or masses.  Lungs are clear with normal breath sounds without wheezes or rales .  Cardiovascular: peripheral circulation is satisfactory, heart sounds are unchanged and unremarkable.  Abdomen is soft, without masses or tenderness, with normal bowel sounds.  Extremities are without significant edema, cyanosis or deformity.      Assessment and Plan:   The following treatment plan was discussed  1. Essential hypertension      Continue low-salt diet.   2. Rectal cancer " (CMS-HCC)      Continue regular follow-up with Dr. Hardy.   3. Hyperlipidemia, unspecified hyperlipidemia type     4. Renal insufficiency      She was encouraged to remain well hydrated. She also will take Mucinex to keep her mucus thinner.   5. Hyperglycemia      She was encouraged to avoid concentrated sweets especially on an empty stomach.   6. Chemotherapy-induced neutropenia (CMS-HCC)      Follow blood counts regularly. She will report any fevers or chills or other sign of infection.   7. Elevated alkaline phosphatase level      Continue regular follow-up.   8. Travel advice encounter      Coordination of travel medications with travel clinic and Dr. Hardy.   9. Chronic cough      Remain well hydrated, take Mucinex. Continue inhalers as directed.   10. Shortness of breath     11. Secondary malignant neoplasm of liver (CMS-HCC)      Per Dr. Hardy.   12. Dehydration      She was encouraged to remain well hydrated.       Followup: Return in about 6 months (around 5/15/2018), or wellness, for Long.

## 2017-11-16 NOTE — ASSESSMENT & PLAN NOTE
Blood sugars a little elevated at 131. She does eat some sweets. She has being getting some steroids she reports.

## 2017-11-16 NOTE — PROGRESS NOTES
Patient seen today for port de-access (post 46 hour home infusion of 5FU).  Pump off and chemo bag empty.  Port flushed per protocol.  Port de-accessed, needle intact, gauze dressing applied.  Patient discharged in good condition.  Returns in 2 weeks, appointment confirmed.

## 2017-11-16 NOTE — ASSESSMENT & PLAN NOTE
Alkaline phosphatase remains consistently elevated at about 180. She does have a lesion on the dome of her liver. There is apparently no evidence of skeletal metastasis.

## 2017-11-28 ENCOUNTER — OFFICE VISIT (OUTPATIENT)
Dept: HEMATOLOGY ONCOLOGY | Facility: MEDICAL CENTER | Age: 77
End: 2017-11-28
Payer: MEDICARE

## 2017-11-28 ENCOUNTER — OUTPATIENT INFUSION SERVICES (OUTPATIENT)
Dept: ONCOLOGY | Facility: MEDICAL CENTER | Age: 77
End: 2017-11-28
Attending: COLON & RECTAL SURGERY
Payer: MEDICARE

## 2017-11-28 ENCOUNTER — TELEPHONE (OUTPATIENT)
Dept: HEMATOLOGY ONCOLOGY | Facility: MEDICAL CENTER | Age: 77
End: 2017-11-28

## 2017-11-28 ENCOUNTER — OUTPATIENT INFUSION SERVICES (OUTPATIENT)
Dept: ONCOLOGY | Facility: MEDICAL CENTER | Age: 77
End: 2017-11-28
Attending: NURSE PRACTITIONER
Payer: MEDICARE

## 2017-11-28 VITALS
SYSTOLIC BLOOD PRESSURE: 139 MMHG | HEIGHT: 65 IN | OXYGEN SATURATION: 100 % | TEMPERATURE: 97.9 F | WEIGHT: 171.74 LBS | RESPIRATION RATE: 18 BRPM | DIASTOLIC BLOOD PRESSURE: 67 MMHG | BODY MASS INDEX: 28.61 KG/M2 | HEART RATE: 103 BPM

## 2017-11-28 VITALS
DIASTOLIC BLOOD PRESSURE: 67 MMHG | RESPIRATION RATE: 18 BRPM | BODY MASS INDEX: 28.61 KG/M2 | WEIGHT: 171.74 LBS | OXYGEN SATURATION: 100 % | HEIGHT: 65 IN | TEMPERATURE: 97.9 F | SYSTOLIC BLOOD PRESSURE: 139 MMHG | HEART RATE: 103 BPM

## 2017-11-28 VITALS
DIASTOLIC BLOOD PRESSURE: 67 MMHG | OXYGEN SATURATION: 100 % | WEIGHT: 171.74 LBS | HEIGHT: 65 IN | RESPIRATION RATE: 18 BRPM | TEMPERATURE: 97.9 F | BODY MASS INDEX: 28.61 KG/M2 | HEART RATE: 103 BPM | SYSTOLIC BLOOD PRESSURE: 139 MMHG

## 2017-11-28 DIAGNOSIS — R39.15 URGENCY OF URINATION: ICD-10-CM

## 2017-11-28 DIAGNOSIS — C20 RECTAL CANCER (HCC): ICD-10-CM

## 2017-11-28 DIAGNOSIS — M54.50 ACUTE LEFT-SIDED LOW BACK PAIN WITHOUT SCIATICA: ICD-10-CM

## 2017-11-28 LAB
APPEARANCE UR: CLEAR
BACTERIA #/AREA URNS HPF: NEGATIVE /HPF
BASOPHILS # BLD AUTO: 0.6 % (ref 0–1.8)
BASOPHILS # BLD: 0.03 K/UL (ref 0–0.12)
BILIRUB UR QL STRIP.AUTO: NEGATIVE
BUN BLD-MCNC: 15 MG/DL (ref 8–22)
CA-I BLD ISE-SCNC: 1 MMOL/L (ref 1.1–1.3)
CHLORIDE BLD-SCNC: 111 MMOL/L (ref 96–112)
CO2 BLD-SCNC: 17 MMOL/L (ref 20–33)
COLOR UR: ABNORMAL
CREAT BLD-MCNC: 1 MG/DL (ref 0.5–1.4)
CULTURE IF INDICATED INDCX: NO UA CULTURE
EOSINOPHIL # BLD AUTO: 0.23 K/UL (ref 0–0.51)
EOSINOPHIL NFR BLD: 4.5 % (ref 0–6.9)
EPI CELLS #/AREA URNS HPF: NEGATIVE /HPF
ERYTHROCYTE [DISTWIDTH] IN BLOOD BY AUTOMATED COUNT: 57.1 FL (ref 35.9–50)
GLUCOSE BLD-MCNC: 81 MG/DL (ref 65–99)
GLUCOSE UR STRIP.AUTO-MCNC: NEGATIVE MG/DL
HCT VFR BLD AUTO: 40.7 % (ref 37–47)
HCT VFR BLD CALC: 37 % (ref 37–47)
HGB BLD-MCNC: 12.6 G/DL (ref 12–16)
HGB BLD-MCNC: 13.3 G/DL (ref 12–16)
HYALINE CASTS #/AREA URNS LPF: NORMAL /LPF
IMM GRANULOCYTES # BLD AUTO: 0.03 K/UL (ref 0–0.11)
IMM GRANULOCYTES NFR BLD AUTO: 0.6 % (ref 0–0.9)
KETONES UR STRIP.AUTO-MCNC: NEGATIVE MG/DL
LEUKOCYTE ESTERASE UR QL STRIP.AUTO: NEGATIVE
LYMPHOCYTES # BLD AUTO: 0.93 K/UL (ref 1–4.8)
LYMPHOCYTES NFR BLD: 18.3 % (ref 22–41)
MCH RBC QN AUTO: 32.8 PG (ref 27–33)
MCHC RBC AUTO-ENTMCNC: 32.7 G/DL (ref 33.6–35)
MCV RBC AUTO: 100.5 FL (ref 81.4–97.8)
MICRO URNS: ABNORMAL
MONOCYTES # BLD AUTO: 0.72 K/UL (ref 0–0.85)
MONOCYTES NFR BLD AUTO: 14.2 % (ref 0–13.4)
NEUTROPHILS # BLD AUTO: 3.14 K/UL (ref 2–7.15)
NEUTROPHILS NFR BLD: 61.8 % (ref 44–72)
NITRITE UR QL STRIP.AUTO: NEGATIVE
NRBC # BLD AUTO: 0 K/UL
NRBC BLD AUTO-RTO: 0 /100 WBC
PH UR STRIP.AUTO: 5.5 [PH]
PLATELET # BLD AUTO: 184 K/UL (ref 164–446)
PMV BLD AUTO: 10.4 FL (ref 9–12.9)
POTASSIUM BLD-SCNC: 3.7 MMOL/L (ref 3.6–5.5)
PROT UR QL STRIP: 30 MG/DL
RBC # BLD AUTO: 4.05 M/UL (ref 4.2–5.4)
RBC # URNS HPF: NORMAL /HPF
RBC UR QL AUTO: NEGATIVE
SODIUM BLD-SCNC: 139 MMOL/L (ref 135–145)
SP GR UR STRIP.AUTO: 1.02
UROBILINOGEN UR STRIP.AUTO-MCNC: 0.2 MG/DL
WBC # BLD AUTO: 5.1 K/UL (ref 4.8–10.8)
WBC #/AREA URNS HPF: NORMAL /HPF

## 2017-11-28 PROCEDURE — 96375 TX/PRO/DX INJ NEW DRUG ADDON: CPT

## 2017-11-28 PROCEDURE — 700105 HCHG RX REV CODE 258: Performed by: NURSE PRACTITIONER

## 2017-11-28 PROCEDURE — 81001 URINALYSIS AUTO W/SCOPE: CPT

## 2017-11-28 PROCEDURE — G0498 CHEMO EXTEND IV INFUS W/PUMP: HCPCS

## 2017-11-28 PROCEDURE — 85014 HEMATOCRIT: CPT | Mod: 59

## 2017-11-28 PROCEDURE — 85025 COMPLETE CBC W/AUTO DIFF WBC: CPT

## 2017-11-28 PROCEDURE — 96360 HYDRATION IV INFUSION INIT: CPT

## 2017-11-28 PROCEDURE — 80047 BASIC METABLC PNL IONIZED CA: CPT

## 2017-11-28 PROCEDURE — 99213 OFFICE O/P EST LOW 20 MIN: CPT | Performed by: NURSE PRACTITIONER

## 2017-11-28 PROCEDURE — 700111 HCHG RX REV CODE 636 W/ 250 OVERRIDE (IP): Performed by: NURSE PRACTITIONER

## 2017-11-28 PROCEDURE — A4212 NON CORING NEEDLE OR STYLET: HCPCS

## 2017-11-28 PROCEDURE — 36591 DRAW BLOOD OFF VENOUS DEVICE: CPT

## 2017-11-28 RX ORDER — LIDOCAINE HYDROCHLORIDE 10 MG/ML
10 INJECTION, SOLUTION INFILTRATION; PERINEURAL ONCE
Status: CANCELLED | OUTPATIENT
Start: 2017-11-28 | End: 2017-11-28

## 2017-11-28 RX ORDER — SODIUM CHLORIDE 9 MG/ML
500 INJECTION, SOLUTION INTRAVENOUS CONTINUOUS
Status: DISCONTINUED | OUTPATIENT
Start: 2017-11-28 | End: 2017-11-28 | Stop reason: HOSPADM

## 2017-11-28 RX ORDER — SODIUM CHLORIDE 9 MG/ML
500 INJECTION, SOLUTION INTRAVENOUS CONTINUOUS
Status: CANCELLED
Start: 2017-11-28

## 2017-11-28 RX ORDER — ONDANSETRON 2 MG/ML
4 INJECTION INTRAMUSCULAR; INTRAVENOUS
Status: CANCELLED | OUTPATIENT
Start: 2017-11-28

## 2017-11-28 RX ORDER — DEXAMETHASONE SODIUM PHOSPHATE 4 MG/ML
8 INJECTION, SOLUTION INTRA-ARTICULAR; INTRALESIONAL; INTRAMUSCULAR; INTRAVENOUS; SOFT TISSUE ONCE
Status: CANCELLED | OUTPATIENT
Start: 2017-11-28 | End: 2017-11-28

## 2017-11-28 RX ORDER — DEXAMETHASONE SODIUM PHOSPHATE 4 MG/ML
8 INJECTION, SOLUTION INTRA-ARTICULAR; INTRALESIONAL; INTRAMUSCULAR; INTRAVENOUS; SOFT TISSUE ONCE
Status: COMPLETED | OUTPATIENT
Start: 2017-11-28 | End: 2017-11-28

## 2017-11-28 RX ORDER — PROCHLORPERAZINE MALEATE 10 MG
10 TABLET ORAL EVERY 6 HOURS PRN
Status: CANCELLED | OUTPATIENT
Start: 2017-11-28

## 2017-11-28 RX ORDER — ONDANSETRON 8 MG/1
8 TABLET, ORALLY DISINTEGRATING ORAL
Status: CANCELLED | OUTPATIENT
Start: 2017-11-28

## 2017-11-28 RX ORDER — DEXTROSE MONOHYDRATE 50 MG/ML
INJECTION, SOLUTION INTRAVENOUS CONTINUOUS
Status: CANCELLED | OUTPATIENT
Start: 2017-11-28

## 2017-11-28 RX ADMIN — SODIUM CHLORIDE 500 ML: 9 INJECTION, SOLUTION INTRAVENOUS at 11:01

## 2017-11-28 RX ADMIN — DEXAMETHASONE SODIUM PHOSPHATE 8 MG: 4 INJECTION, SOLUTION INTRAMUSCULAR; INTRAVENOUS at 11:41

## 2017-11-28 RX ADMIN — FLUOROURACIL 3650 MG: 50 INJECTION, SOLUTION INTRAVENOUS at 12:06

## 2017-11-28 ASSESSMENT — ENCOUNTER SYMPTOMS
COUGH: 1
CHILLS: 0
DIARRHEA: 1
NAUSEA: 0
HEMOPTYSIS: 0
CONSTIPATION: 0
DIZZINESS: 0
SPUTUM PRODUCTION: 0
WHEEZING: 0
HEADACHES: 0
SHORTNESS OF BREATH: 1
PALPITATIONS: 1
VOMITING: 0
WEIGHT LOSS: 0
FEVER: 0
TINGLING: 0
BACK PAIN: 1

## 2017-11-28 ASSESSMENT — PAIN SCALES - GENERAL
PAINLEVEL: 2=MINIMAL-SLIGHT
PAINLEVEL: 2=MINIMAL-SLIGHT

## 2017-11-28 NOTE — TELEPHONE ENCOUNTER
"Called Ms. Walters and informed her that her UA was negative for infection, but that her urine was dark. Pt then laughed and stated \"Yeah, I haven't had a lot of water today\". This RN educated on the importance of staying hydrated. Pt then stated \"I am going to toss back some water right now\". This RN informed patient that she would be following-up next week with her to see how she was feeling. Informed to please call with any questions or concerns.   "

## 2017-11-28 NOTE — PROGRESS NOTES
Pt returns to IS from appointment with ANDREW De.  Additional orders reviewed for hydration and labs, followed by 5FU pump connect for 46 hour home infusion.  Blood return verified from previously accessed port.  BMP completed via Istat, results reviewed.  Hydration completed without incident.  Premedication dexamethasone administered per MAR, tolerated well.  Pt connected to infusion pump.  She is unable to obtain UA sample but will return later today when she is able to provide sample.  Pt sent home with collection cup.  Verified her next appointment for pump disconnect.  Pt discharged from IS in NAD under care of spouse.    1400 Patient returns with UA sample.  Sent to lab for processing.

## 2017-11-28 NOTE — PROGRESS NOTES
Chemotherapy Verification - SECONDARY RN       Height = 166 cm  Weight = 77.9 kg  BSA = 1.895 m2       Medication: Home infusion 5FU  Dose: 1920 mg/m2 dr  Calculated Dose: 3638.9 mg                             (In mg/m2, AUC, mg/kg)       I confirm that this process was performed independently.

## 2017-11-28 NOTE — PROGRESS NOTES
Pt presents ambulatory to IS for labs/port access prior to 5FU pump hook up later this morning.  Plan of care discussed, pt reports feeling well overall.  L chest port in place with EMLA cream, accessed in sterile fashion; brisk blood return noted.  CBC drawn per orders and sent to lab.  Port flushed with NS and pt left accessed for chemotherapy appointment this morning after MD office visit.  Pt tolerated well.  Discharged from IS in NAD.  Returns at 1100.

## 2017-11-28 NOTE — PROGRESS NOTES
"Pharmacy Chemotherapy Verification    Patient Name: Karlene Walters      Dx: Colon CA    Cycle:  49   Previous treatment: 11/14/17    Protocol: 5 FU/Leucovorin      Leucovorin deleted from treatment plan starting with C47 ([place] #39) on 10/31/17 by Hayley LUCERO due to neutropenia    - Dose reduced by 20% to 320 mg/m² starting with Cycle 18 ([place] #11) on 7/5/16 due to neutropenia    Bolus fluorouracil deleted from treatment plan starting with C47 ([place] #39) on 10/31/17 by Hayley LUCERO due to neutropenia   Fluorouracil 1200 mg/m²  IV continuous infusion daily on Days 1 and 2 (2400 mg/m² IV over 46-48 hours) -    - Infusion Dose reduced by 20% starting with Cycle 18 ([place] #11) on 7/5/16 due to neutropenia   - 20% reduction (1920 mg/m²) to be continued starting C28 per Alsop APRN   14 day cycles for 12 cycles (adjuvant) or until disease progression or unacceptable toxicity    NCCN Guidelines for Colon Cancer. V.2.2015.  Jay GROVER, et al. Eur J Cancer.1999;35(9):1343-7.          Allergies: Codeine; Oxaliplatin; Pcn [penicillins]; Sulfa drugs; and Tape  /67   Pulse (!) 103   Temp 36.6 °C (97.9 °F)   Resp 18   Ht 1.66 m (5' 5.35\")   Wt 77.9 kg (171 lb 11.8 oz)   SpO2 100%   BMI 28.27 kg/m²  Body surface area is 1.9 meters squared.     ANC~ 3140 Plt = 184k   Hgb = 13.3    11/14/17 SCr = 0.97 mg/dL CrCl ~ 60 mL/min   LFT's = WNL, except AP = 180 TBili = 0.8  Fluorouracil (5-FU) 1920 mg/m²/dose x 1.90 m² = 3648 mg   <5% difference, OK to treat with final written dose = 3650 mg CIVI    via CADD pump at 1.6 ml/hr x 46 hour    Norris Mitchell, PharmD    "

## 2017-11-28 NOTE — PROGRESS NOTES
"Pharmacy Chemotherapy Verification Note:    Patient Name: Karlene Walters      Dx: Colon CA        Protocol: 5-FU+Leucovorin        *Dosing Reference*   IV over 2 hours on Day 1, followed by   IVP on Day 1, followed by    - Dose reduced by 20% to 320 mg/m² starting with Cycle 18 ([place] #11) on 7/5/16 due to neutropenia    10/31/17: delete Leucovorin and 5-FU push d/t neutropenia per Dr. Hardy   Fluorouracil 1200 mg/m²  IV continuous infusion daily on Days 1 and 2 (2400 mg/m² IV over 46-48 hours) -    - Infusion Dose reduced by 20% starting with Cycle 18 ([place] #11) on 7/5/16 due to neutropenia   -20% reduction (1920 mg/m2) to be continued starting C28 per C Alsop APRN   14 day cycles for 12 cycles (adjuvant) or until disease progression or unacceptable toxicity     NCCN Guidelines for Colon Cancer. V.2.2015.  Jay T, et al. Eur J Cancer.1999;35(9):1343-7.      Allergies:  Codeine; Oxaliplatin; Pcn; Sulfa drugs; and Tape       /67   Pulse (!) 103   Temp 36.6 °C (97.9 °F)   Resp 18   Ht 1.66 m (5' 5.35\")   Wt 77.9 kg (171 lb 11.8 oz)   SpO2 100%   BMI 28.27 kg/m²  Body surface area is 1.9 meters squared.    Labs 11/28/17:  ANC~ 3100   Plt = 184k Hgb = 13.3    Labs 11/14/17:  SCr = 0.97 mg/dL CrCl ~ 60 mL/min LFT = WNL except AP = 180  TBili = 0.8     Drug Order   (Drug name, dose, route, IV Fluid & volume, frequency, number of doses) Cycle 49   Previous treatment: C48 = 11/14/17     Medication = Fluorouracil (5-FU)   Base Dose = 1920 mg/m²/dose  Calc Dose:Base Dose x 1.9 m² = 3648 mg  Final Dose = 3650 mg   Route = CIVI  Conc = 50 mg/mL = 73 mL   Fluid & Volume = CADD pump 73 mL (+ 3 mL overfill = 76 mL)  Admin Duration = Over 46 hours  Rate = 1.6 mL/hour   Via CADD pump for home infusion        <5% difference, ok to treat with final dose     By my signature below, I confirm this process was performed independently with the BSA and all final chemotherapy dosing calculations congruent. I have " reviewed the above chemotherapy order and that my calculation of the final dose and BSA (when applicable) corroborate those calculations of the  pharmacist.     Lalit Andrade, PharmD, BCOP

## 2017-11-28 NOTE — PROGRESS NOTES
"Subjective:      Karlene Walters is a 77 y.o. female who presents for Follow-Up (prechemo)  for metastatic rectal cancer to liver.         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 She is accompanied by her  for today's visit. Patient is due to receive single agent 5-FU today and has been tolerating treatment well. She recently underwent Y 90 and followed up with interventional radiology for their recommendations. She is scheduled to follow-up with interventional radiology in January 2018.    Patient has been tolerating treatment well. She was recently seen by pulmonary due to significant shortness of breath and CT chest was completed. She has been given inhalers and stated that has been helping with her shortness of breath. She states that at times with the shortness of breath she will have heart palpitations. She does see cardiology, Dr. Haque and was recently seen approximately 2 months ago. She has noticed some mild swelling in her right lateral lower extremity at times. It is intermittent. She denies chest pain. She did have an episode of diarrhea last Friday, where she required to take 7 Imodium to get it under control. Since then she had some mild constipation but it has resolved as of today. She also explained that she is been having the urgency to urinate approximately 6-8 times per day but would only \"dribble\" of urination. She states she is going approximately 3 times per day. She is complaining of new onset of lower back pain. She stated she noted the back pain approximately a week ago and it is more so on the left lateral side. She did state it feels to be muscular and she had been moving cabinets around in her kitchen. She is also complaining of increasing cramps in her hands and legs at times. I did complete potassium level was 3.7 today. She takes potassium at home only which takes Lasix. She stated the last dose of Lasix was approximately a few weeks " "ago. She has been trying to cut back and her coffee drinking to about 2 cups per day. She is also attempting to drink about 3-4 bottles of water per day. She believes that some of her symptoms may have started when the leucovorin and bolus of 5-FU had been discontinued.    Allergies   Allergen Reactions   • Codeine      \"gets drunk\"   • Oxaliplatin Anaphylaxis   • Pcn [Penicillins] Itching   • Sulfa Drugs Itching   • Tape Rash     PAPER TAPE OK     Current Outpatient Prescriptions on File Prior to Visit   Medication Sig Dispense Refill   • oxycodone immediate-release (ROXICODONE) 5 MG Tab Take 1 Tab by mouth every four hours as needed for Severe Pain. 30 Tab 0   • Tiotropium Bromide-Olodaterol (STIOLTO RESPIMAT) 2.5-2.5 MCG/ACT Aero Soln Take 2 Puffs by mouth every day. 1 Inhaler 11   • albuterol (PROAIR HFA) 108 (90 Base) MCG/ACT Aero Soln inhalation aerosol Inhale 2 Puffs by mouth every four hours as needed for Shortness of Breath (wheezing). 1 Inhaler 11   • furosemide (LASIX) 40 MG Tab Take 1 Tab by mouth every day. 30 Tab 11   • ranitidine (ZANTAC) 150 MG Tab TAKE 1 TABLET 2 TIMES A DAY. 60 Tab 3   • XARELTO 20 MG Tab tablet TAKE 1 TABLET WITH DINNER. 30 Tab 6   • Esomeprazole Magnesium 10 MG Pack Take  by mouth.     • potassium chloride ER (KLOR-CON) 10 MEQ tablet Take 1 Tab by mouth every day. 30 Tab 3   • lidocaine-prilocaine (EMLA) 2.5-2.5 % Cream APPLY A THICK FILM OVER THE PORT ACCESS SITE PRIOR TO ACCESS 30 g 2   • loperamide (IMODIUM) 1 MG/5ML Liquid Take 2 mg by mouth 4 times a day as needed for Diarrhea.     • ondansetron (ZOFRAN) 4 MG Tab tablet      • cyanocobalamin (VITAMIN B-12) 500 MCG Tab Take 500 mcg by mouth every day.     • Multiple Vitamins-Minerals (CENTRUM SILVER PO) Take  by mouth.     • Cholecalciferol (VITAMIN D3) 400 UNITS CAPS Take 1 Cap by mouth every day.     • loratadine (CLARITIN) 10 MG TABS Take 10 mg by mouth every day. Indications: Hayfever       Current Facility-Administered " "Medications on File Prior to Visit   Medication Dose Route Frequency Provider Last Rate Last Dose   • dexamethasone (DECADRON) injection 8 mg  8 mg Intravenous Once Ella Alsop, A.P.N.       • NS infusion 500 mL  500 mL Intravenous Continuous Ella Alsop, A.P.N.   500 mL at 11/28/17 1101   • fluorouracil (ADRUCIL) 3,650 mg in CADD Cassette/Bag Chemo infusion  1,920 mg/m2 Intravenous Once Ella Alsop, A.P.N.       • heparin pf injection 500 Units  500 Units Intracatheter PRN Ella Alsop, A.P.N.   500 Units at 08/22/17 0856       Review of Systems   Constitutional: Positive for malaise/fatigue. Negative for chills, fever and weight loss.   Respiratory: Positive for cough and shortness of breath. Negative for hemoptysis, sputum production and wheezing.    Cardiovascular: Positive for palpitations (associated with SOB) and leg swelling (right lateral lower extremity edema at times). Negative for chest pain.   Gastrointestinal: Positive for diarrhea (an episode of diarrhea that was pure liquid - she took 7 imodium which got it under control. Normal per patient's routine at this time. ). Negative for constipation, nausea and vomiting.   Genitourinary: Negative for dysuria.        She does have urge to urinate 6-8 times per day, but only dribbles. She is going about 3 times per day.    Musculoskeletal: Positive for back pain.   Neurological: Negative for dizziness, tingling and headaches.          Objective:     /67   Pulse (!) 103   Temp 36.6 °C (97.9 °F)   Resp 18   Ht 1.66 m (5' 5.35\")   Wt 77.9 kg (171 lb 11.8 oz)   SpO2 100%   BMI 28.27 kg/m²      Physical Exam   Constitutional: She is oriented to person, place, and time. She appears well-developed and well-nourished. No distress.   HENT:   Head: Normocephalic and atraumatic.   Mouth/Throat: Oropharynx is clear and moist. No oropharyngeal exudate.   Alopecia   Cardiovascular: Regular rhythm and normal heart sounds.  Exam reveals no gallop " and no friction rub.    No murmur heard.  Slightly elevated at 103   Pulmonary/Chest: Effort normal and breath sounds normal. No respiratory distress. She has no wheezes.   Abdominal: Soft. Bowel sounds are normal. She exhibits no distension. There is no tenderness.   Musculoskeletal: Normal range of motion. She exhibits no edema or tenderness.   Neurological: She is alert and oriented to person, place, and time.   Skin: Skin is warm and dry. No rash noted. She is not diaphoretic. No erythema. No pallor.   Psychiatric: She has a normal mood and affect. Her behavior is normal.   Vitals reviewed.      [facility] on 11/28/2017   Component Date Value Ref Range Status   • Istat CO2 11/28/2017 17* 20 - 33 mmol/L Final   • Istat Glucose 11/28/2017 81  65 - 99 mg/dL Final   • Istat Bun 11/28/2017 15  8 - 22 mg/dL Final   • Istat Creatinine 11/28/2017 1.0  0.5 - 1.4 mg/dL Final   • Istat Sodium 11/28/2017 139  135 - 145 mmol/L Final   • Istat Potassium 11/28/2017 3.7  3.6 - 5.5 mmol/L Final   • Istat Ionized Calcium 11/28/2017 1.00* 1.10 - 1.30 mmol/L Final   • Istat Chloride 11/28/2017 111  96 - 112 mmol/L Final   • Istat Hematocrit 11/28/2017 37  37 - 47 % Final   • Istat Hemoglobin 11/28/2017 12.6  12.0 - 16.0 g/dL Final   [facility] on 11/28/2017   Component Date Value Ref Range Status   • WBC 11/28/2017 5.1  4.8 - 10.8 K/uL Final   • RBC 11/28/2017 4.05* 4.20 - 5.40 M/uL Final   • Hemoglobin 11/28/2017 13.3  12.0 - 16.0 g/dL Final   • Hematocrit 11/28/2017 40.7  37.0 - 47.0 % Final   • MCV 11/28/2017 100.5* 81.4 - 97.8 fL Final   • MCH 11/28/2017 32.8  27.0 - 33.0 pg Final   • MCHC 11/28/2017 32.7* 33.6 - 35.0 g/dL Final   • RDW 11/28/2017 57.1* 35.9 - 50.0 fL Final   • Platelet Count 11/28/2017 184  164 - 446 K/uL Final   • MPV 11/28/2017 10.4  9.0 - 12.9 fL Final   • Neutrophils-Polys 11/28/2017 61.80  44.00 - 72.00 % Final   • Lymphocytes 11/28/2017 18.30* 22.00 - 41.00 % Final   •  Monocytes 11/28/2017 14.20* 0.00 - 13.40 % Final   • Eosinophils 11/28/2017 4.50  0.00 - 6.90 % Final   • Basophils 11/28/2017 0.60  0.00 - 1.80 % Final   • Immature Granulocytes 11/28/2017 0.60  0.00 - 0.90 % Final   • Nucleated RBC 11/28/2017 0.00  /100 WBC Final   • Neutrophils (Absolute) 11/28/2017 3.14  2.00 - 7.15 K/uL Final   • Lymphs (Absolute) 11/28/2017 0.93* 1.00 - 4.80 K/uL Final   • Monos (Absolute) 11/28/2017 0.72  0.00 - 0.85 K/uL Final   • Eos (Absolute) 11/28/2017 0.23  0.00 - 0.51 K/uL Final   • Baso (Absolute) 11/28/2017 0.03  0.00 - 0.12 K/uL Final   • Immature Granulocytes (abs) 11/28/2017 0.03  0.00 - 0.11 K/uL Final   • NRBC (Absolute) 11/28/2017 0.00  K/uL Final          Assessment/Plan:     1. Rectal cancer (CMS-HCC)  URINALYSIS,CULTURE IF INDICATED    DISCONTINUED: dexamethasone (DECADRON) injection 8 mg    DISCONTINUED: NS infusion 500 mL    CANCELED: URINALYSIS,CULTURE IF INDICATED   2. Acute left-sided low back pain without sciatica  URINALYSIS,CULTURE IF INDICATED    CANCELED: URINALYSIS,CULTURE IF INDICATED   3. Urgency of urination  URINALYSIS,CULTURE IF INDICATED    CANCELED: URINALYSIS,CULTURE IF INDICATED       1. Patient is scheduled to receive single agent 5-FU today. She is to continue with 5-FU therapy every 2 weeks as planned. It did review her CBC, CMP and okay to proceed with treatment as planned. CEA has continued to trend down. Her last CEA was 1.3 on November 14, 2017. We will continue to monitor her CEA monthly and follow it closely.    2. According to Dr. Hardy he would like to repeat imaging in approximately 6 months. He did mention that imaging to be ordered through interventional radiology per their discretion. She is to follow-up with interventional radiology in January 2018.    3. Patient was having significant shortness of breath. She recently established care with pulmonary. She did have a CT scan of the chest which was very stable from previous CT scans in  August 2017. She has been started on inhalers per pulmonary. She is to continue to follow up with pulmonary as recommended.    4. Patient complains of lower back pain. She also complains of urgency but unable to urinate as well as she used to in the past. I will proceed with a UA today to rule out a UTI. She also complained of some cramping in her hands and feels like this may be related to hydration. I will proceed with a 500 cc bolus of fluid today to see if that assists with some of her symptoms, as well as I ordered a BMP to check potassium. I spoke with Dr. Hardy who is in agreement with the plan.    5. Patient is to follow-up in 4 weeks, with alternating cycles of 5-FU. She is to contact the office sooner if needed. Patient to continue to monitor her back pain and cholecystectomy does not improve.

## 2017-11-28 NOTE — PROGRESS NOTES
Chemotherapy Verification - PRIMARY RN      Height = 166 cm  Weight = 77.9 kg  BSA = 1.9 m2       Medication: Fluorouracil home infusion   Dose: 1920 mg/m2  Calculated Dose: 3648 mg over 46 hours                             (In mg/m2, AUC, mg/kg)     I confirm this process was performed independently with the BSA and all final chemotherapy dosing calculations congruent.  Any discrepancies of 5% or greater have been addressed with the chemotherapy pharmacist. The resolution of the discrepancy has been documented in the EPIC progress notes.

## 2017-11-30 ENCOUNTER — OUTPATIENT INFUSION SERVICES (OUTPATIENT)
Dept: ONCOLOGY | Facility: MEDICAL CENTER | Age: 77
End: 2017-11-30
Attending: INTERNAL MEDICINE
Payer: MEDICARE

## 2017-11-30 VITALS
HEART RATE: 98 BPM | DIASTOLIC BLOOD PRESSURE: 64 MMHG | HEIGHT: 65 IN | WEIGHT: 171.08 LBS | BODY MASS INDEX: 28.5 KG/M2 | TEMPERATURE: 97.4 F | RESPIRATION RATE: 18 BRPM | OXYGEN SATURATION: 95 % | SYSTOLIC BLOOD PRESSURE: 132 MMHG

## 2017-11-30 DIAGNOSIS — C20 RECTAL CANCER (HCC): ICD-10-CM

## 2017-11-30 PROCEDURE — 700111 HCHG RX REV CODE 636 W/ 250 OVERRIDE (IP)

## 2017-11-30 PROCEDURE — 96523 IRRIG DRUG DELIVERY DEVICE: CPT

## 2017-11-30 RX ADMIN — HEPARIN 500 UNITS: 100 SYRINGE at 11:24

## 2017-11-30 ASSESSMENT — PAIN SCALES - GENERAL: PAINLEVEL: NO PAIN

## 2017-11-30 NOTE — PROGRESS NOTES
Pt presents ambulatory to  for 5FU pump disconnect.  She states she's not feeling well today and can't find her Zofran.  She thinks she left it at home in [facility].  She states she feels ok if she sits still.  RN offered to ask MD order for one time dose of antiemetic.  Pt declines and thinks she can wait until she gets home.  Infusion completed over 46 hours, 74.6 mL delivered.  Pump disconnected, port flushed per protocol, and de-accessed.  Site covered with sterile gauze and tape.  Next appointment confirmed.  Pt discharged from [facility] in [facility] under care of spouse.

## 2017-12-05 ENCOUNTER — TELEPHONE (OUTPATIENT)
Dept: HEMATOLOGY ONCOLOGY | Facility: MEDICAL CENTER | Age: 77
End: 2017-12-05

## 2017-12-05 NOTE — TELEPHONE ENCOUNTER
Made a follow-up call with Karlene to assess how she is feeling after having urinary symptoms in infusion last week. Left voicemail asking her to please call [phone] if she has any questions or concerns.

## 2017-12-05 NOTE — TELEPHONE ENCOUNTER
Prior Authorization started through cover my meds, faxed clinicals and [employer] form to [phone]. Scanned [employer] form into [employer].

## 2017-12-11 RX ORDER — LIDOCAINE HYDROCHLORIDE 10 MG/ML
10 INJECTION, SOLUTION INFILTRATION; PERINEURAL ONCE
Status: CANCELLED | OUTPATIENT
Start: 2017-12-12 | End: 2017-12-12

## 2017-12-11 RX ORDER — PROCHLORPERAZINE MALEATE 10 MG
10 TABLET ORAL EVERY 6 HOURS PRN
Status: CANCELLED | OUTPATIENT
Start: 2017-12-12

## 2017-12-11 RX ORDER — DEXTROSE MONOHYDRATE 50 MG/ML
INJECTION, SOLUTION INTRAVENOUS CONTINUOUS
Status: CANCELLED | OUTPATIENT
Start: 2017-12-12

## 2017-12-11 RX ORDER — ONDANSETRON 2 MG/ML
4 INJECTION INTRAMUSCULAR; INTRAVENOUS
Status: CANCELLED | OUTPATIENT
Start: 2017-12-12

## 2017-12-11 RX ORDER — ONDANSETRON 8 MG/1
8 TABLET, ORALLY DISINTEGRATING ORAL
Status: CANCELLED | OUTPATIENT
Start: 2017-12-12

## 2017-12-11 RX ORDER — DEXAMETHASONE SODIUM PHOSPHATE 4 MG/ML
8 INJECTION, SOLUTION INTRA-ARTICULAR; INTRALESIONAL; INTRAMUSCULAR; INTRAVENOUS; SOFT TISSUE ONCE
Status: CANCELLED | OUTPATIENT
Start: 2017-12-12 | End: 2017-12-12

## 2017-12-12 ENCOUNTER — OUTPATIENT INFUSION SERVICES (OUTPATIENT)
Dept: ONCOLOGY | Facility: MEDICAL CENTER | Age: 77
End: 2017-12-12
Attending: INTERNAL MEDICINE
Payer: MEDICARE

## 2017-12-12 VITALS
WEIGHT: 168.87 LBS | DIASTOLIC BLOOD PRESSURE: 65 MMHG | RESPIRATION RATE: 18 BRPM | TEMPERATURE: 97.1 F | SYSTOLIC BLOOD PRESSURE: 133 MMHG | OXYGEN SATURATION: 98 % | BODY MASS INDEX: 28.14 KG/M2 | HEART RATE: 80 BPM | HEIGHT: 65 IN

## 2017-12-12 DIAGNOSIS — C20 RECTAL CANCER (HCC): ICD-10-CM

## 2017-12-12 DIAGNOSIS — R94.4 DECREASED GFR: ICD-10-CM

## 2017-12-12 LAB
ALBUMIN SERPL BCP-MCNC: 3.5 G/DL (ref 3.2–4.9)
ALBUMIN/GLOB SERPL: 1.4 G/DL
ALP SERPL-CCNC: 212 U/L (ref 30–99)
ALT SERPL-CCNC: 28 U/L (ref 2–50)
ANION GAP SERPL CALC-SCNC: 8 MMOL/L (ref 0–11.9)
AST SERPL-CCNC: 34 U/L (ref 12–45)
BASOPHILS # BLD AUTO: 1 % (ref 0–1.8)
BASOPHILS # BLD: 0.05 K/UL (ref 0–0.12)
BILIRUB SERPL-MCNC: 0.9 MG/DL (ref 0.1–1.5)
BUN SERPL-MCNC: 19 MG/DL (ref 8–22)
CALCIUM SERPL-MCNC: 9.4 MG/DL (ref 8.5–10.5)
CEA SERPL-MCNC: 2.1 NG/ML (ref 0–3)
CHLORIDE SERPL-SCNC: 104 MMOL/L (ref 96–112)
CO2 SERPL-SCNC: 23 MMOL/L (ref 20–33)
CREAT SERPL-MCNC: 0.94 MG/DL (ref 0.5–1.4)
EOSINOPHIL # BLD AUTO: 0.3 K/UL (ref 0–0.51)
EOSINOPHIL NFR BLD: 5.7 % (ref 0–6.9)
ERYTHROCYTE [DISTWIDTH] IN BLOOD BY AUTOMATED COUNT: 54.5 FL (ref 35.9–50)
GFR SERPL CREATININE-BSD FRML MDRD: 58 ML/MIN/1.73 M 2
GLOBULIN SER CALC-MCNC: 2.5 G/DL (ref 1.9–3.5)
GLUCOSE SERPL-MCNC: 116 MG/DL (ref 65–99)
HCT VFR BLD AUTO: 39.3 % (ref 37–47)
HGB BLD-MCNC: 13.1 G/DL (ref 12–16)
IMM GRANULOCYTES # BLD AUTO: 0.02 K/UL (ref 0–0.11)
IMM GRANULOCYTES NFR BLD AUTO: 0.4 % (ref 0–0.9)
LYMPHOCYTES # BLD AUTO: 0.82 K/UL (ref 1–4.8)
LYMPHOCYTES NFR BLD: 15.6 % (ref 22–41)
MCH RBC QN AUTO: 32.8 PG (ref 27–33)
MCHC RBC AUTO-ENTMCNC: 33.3 G/DL (ref 33.6–35)
MCV RBC AUTO: 98.5 FL (ref 81.4–97.8)
MONOCYTES # BLD AUTO: 0.93 K/UL (ref 0–0.85)
MONOCYTES NFR BLD AUTO: 17.7 % (ref 0–13.4)
NEUTROPHILS # BLD AUTO: 3.14 K/UL (ref 2–7.15)
NEUTROPHILS NFR BLD: 59.6 % (ref 44–72)
NRBC # BLD AUTO: 0 K/UL
NRBC BLD AUTO-RTO: 0 /100 WBC
PLATELET # BLD AUTO: 174 K/UL (ref 164–446)
PMV BLD AUTO: 10.3 FL (ref 9–12.9)
POTASSIUM SERPL-SCNC: 3.6 MMOL/L (ref 3.6–5.5)
PROT SERPL-MCNC: 6 G/DL (ref 6–8.2)
RBC # BLD AUTO: 3.99 M/UL (ref 4.2–5.4)
SODIUM SERPL-SCNC: 135 MMOL/L (ref 135–145)
WBC # BLD AUTO: 5.3 K/UL (ref 4.8–10.8)

## 2017-12-12 PROCEDURE — 82378 CARCINOEMBRYONIC ANTIGEN: CPT

## 2017-12-12 PROCEDURE — 96374 THER/PROPH/DIAG INJ IV PUSH: CPT | Mod: XU

## 2017-12-12 PROCEDURE — 700101 HCHG RX REV CODE 250: Performed by: NURSE PRACTITIONER

## 2017-12-12 PROCEDURE — 80053 COMPREHEN METABOLIC PANEL: CPT

## 2017-12-12 PROCEDURE — 700111 HCHG RX REV CODE 636 W/ 250 OVERRIDE (IP)

## 2017-12-12 PROCEDURE — 700111 HCHG RX REV CODE 636 W/ 250 OVERRIDE (IP): Performed by: NURSE PRACTITIONER

## 2017-12-12 PROCEDURE — G0498 CHEMO EXTEND IV INFUS W/PUMP: HCPCS

## 2017-12-12 PROCEDURE — 85025 COMPLETE CBC W/AUTO DIFF WBC: CPT

## 2017-12-12 PROCEDURE — A4212 NON CORING NEEDLE OR STYLET: HCPCS

## 2017-12-12 RX ORDER — LIDOCAINE HYDROCHLORIDE 10 MG/ML
10 INJECTION, SOLUTION INFILTRATION; PERINEURAL ONCE
Status: COMPLETED | OUTPATIENT
Start: 2017-12-12 | End: 2017-12-12

## 2017-12-12 RX ORDER — DEXAMETHASONE SODIUM PHOSPHATE 4 MG/ML
8 INJECTION, SOLUTION INTRA-ARTICULAR; INTRALESIONAL; INTRAMUSCULAR; INTRAVENOUS; SOFT TISSUE ONCE
Status: COMPLETED | OUTPATIENT
Start: 2017-12-12 | End: 2017-12-12

## 2017-12-12 RX ADMIN — LIDOCAINE HYDROCHLORIDE 10 ML: 10 INJECTION, SOLUTION INFILTRATION; PERINEURAL at 11:06

## 2017-12-12 RX ADMIN — DEXAMETHASONE SODIUM PHOSPHATE 8 MG: 4 INJECTION, SOLUTION INTRAMUSCULAR; INTRAVENOUS at 12:29

## 2017-12-12 RX ADMIN — FLUOROURACIL 3610 MG: 50 INJECTION, SOLUTION INTRAVENOUS at 12:42

## 2017-12-12 ASSESSMENT — PAIN SCALES - GENERAL: PAINLEVEL: NO PAIN

## 2017-12-12 NOTE — PROGRESS NOTES
Chemotherapy Verification - PRIMARY RN      Height = 65.35in  Weight = 76.6kg  BSA = 1.88m2       Medication: Fluorouracil  Dose: 1920mg/m2  (80% of original dose of 2400mg/m2) Calculated Dose: 3609.6mg                             (In mg/m2, AUC, mg/kg)

## 2017-12-12 NOTE — PROGRESS NOTES
"Patient seen today for chemotherapy.  Plan of care discussed.  Assessment completed.  Port accessed using sterile technique. Port with good blood return.  Samples obtained for CBC, CMP, and CEA as per orders.  Premedication of Dexamethasone given followed by 5FU CADD pump connection.  Pump confirmed and on \"RUN\" at time of discharge.  Returns Thursday at 13:30, appointment confirmed.   "

## 2017-12-12 NOTE — PROGRESS NOTES
Chemotherapy Verification - SECONDARY RN       Height = 65.35 inches  Weight = 76.6 kg  BSA = 1.88 m2       Medication: 5FU  Dose: 1920 mg/m2 (80% of 2400 mg/m2)  Calculated Dose: 3610 mg over 46  hours                            (In mg/m2, AUC, mg/kg)     I confirm that this process was performed independently.

## 2017-12-12 NOTE — PROGRESS NOTES
"Pharmacy Chemotherapy Verification Note:    Patient Name: Karlene Walters      Dx: Colon CA        Protocol: 5-FU+Leucovorin        *Dosing Reference*   IV over 2 hours on Day 1, followed by   IVP on Day 1, followed by    - Dose reduced by 20% to 320 mg/m² starting with Cycle 18 ([place] #11) on 7/5/16 due to neutropenia    10/31/17: delete Leucovorin and 5-FU push d/t neutropenia per Dr. Hardy   Fluorouracil 1200 mg/m²  IV continuous infusion daily on Days 1 and 2 (2400 mg/m² IV over 46-48 hours) -    - Infusion Dose reduced by 20% starting with Cycle 18 ([place] #11) on 7/5/16 due to neutropenia   -20% reduction (1920 mg/m2) to be continued starting C28 per C Alsop APRN   14 day cycles for 12 cycles (adjuvant) or until disease progression or unacceptable toxicity     NCCN Guidelines for Colon Cancer. V.2.2015.  Jay T, et al. Eur J Cancer.1999;35(9):1343-7.      Allergies:  Codeine; Oxaliplatin; Pcn; Sulfa drugs; and Tape       /65   Pulse (!) 105   Temp 36.2 °C (97.1 °F)   Resp 18   Ht 1.66 m (5' 5.35\")   Wt 76.6 kg (168 lb 14 oz)   SpO2 99%   BMI 27.80 kg/m²  Body surface area is 1.88 meters squared.    Labs 12/12/17:  ANC~ 3100   Plt = 174k Hgb = 13.1  SCr = 0.94 mg/dL CrCl ~ 61 mL/min LFT = WNL except AP = 212  TBili = 0.9     Drug Order   (Drug name, dose, route, IV Fluid & volume, frequency, number of doses) Cycle 50   Previous treatment: C49 = 11/28/17     Medication = Fluorouracil (5-FU)   Base Dose = 1920 mg/m²/dose  Calc Dose:Base Dose x 1.88 m² = 3609 mg  Final Dose = 3610 mg   Route = CIVI  Conc = 50 mg/mL = 72.2 mL   Fluid & Volume = CADD pump 72.2 mL (+ 3 mL overfill = 75.2 mL)  Admin Duration = Over 46 hours  Rate = 1.6 mL/hour   Via CADD pump for home infusion        <5% difference, ok to treat with final dose     By my signature below, I confirm this process was performed independently with the BSA and all final chemotherapy dosing calculations congruent. I have reviewed the above " chemotherapy order and that my calculation of the final dose and BSA (when applicable) corroborate those calculations of the  pharmacist.     Lalit Andrade, PharmD, BCOP

## 2017-12-12 NOTE — PROGRESS NOTES
"Pharmacy Chemotherapy Verification    Patient Name: Karlene Walters      Dx: Colon CA    Cycle:  50   Previous treatment: 11/28/17    Protocol: 5 FU/Leucovorin      Leucovorin deleted from treatment plan starting with C47 ([place] #39) on 10/31/17 by Hayley LUCERO due to neutropenia    - Dose reduced by 20% to 320 mg/m² starting with Cycle 18 ([place] #11) on 7/5/16 due to neutropenia    Bolus fluorouracil deleted from treatment plan starting with C47 ([place] #39) on 10/31/17 by Hayley LUCERO due to neutropenia   Fluorouracil 1200 mg/m²  IV continuous infusion daily on Days 1 and 2 (2400 mg/m² IV over 46-48 hours) -    - Infusion Dose reduced by 20% starting with Cycle 18 ([place] #11) on 7/5/16 due to neutropenia   - 20% reduction (1920 mg/m²) to be continued starting C28 per Alsop APRN   14 day cycles for 12 cycles (adjuvant) or until disease progression or unacceptable toxicity    NCCN Guidelines for Colon Cancer. V.2.2015.  Jay T, et al. Eur J Cancer.1999;35(9):1343-7.          Allergies: Codeine; Oxaliplatin; Pcn [penicillins]; Sulfa drugs; and Tape  /65   Pulse 80   Temp 36.2 °C (97.1 °F)   Resp 18   Ht 1.66 m (5' 5.35\")   Wt 76.6 kg (168 lb 14 oz)   SpO2 98%   BMI 27.80 kg/m²  Body surface area is 1.88 meters squared.      ANC~ 3140 Plt = 174k   Hgb = 13.1    12/12/17 SCr = 0.94 mg/dL CrCl ~ 61 mL/min   LFT's = WNL, except AP = 212 TBili = 0.9  No liver adjustment recommended  Fluorouracil (5-FU) 1920 mg/m²/dose x 1.88 m² = 3610 mg   <5% difference, OK to treat with final written dose = 3610 mg CIVI    via CADD pump at 1.6 ml/hr x 46 hour    Norris Mitchell, PharmD    "

## 2017-12-14 ENCOUNTER — OUTPATIENT INFUSION SERVICES (OUTPATIENT)
Dept: ONCOLOGY | Facility: MEDICAL CENTER | Age: 77
End: 2017-12-14
Attending: INTERNAL MEDICINE
Payer: MEDICARE

## 2017-12-14 VITALS
HEART RATE: 80 BPM | OXYGEN SATURATION: 96 % | WEIGHT: 170.86 LBS | BODY MASS INDEX: 28.47 KG/M2 | SYSTOLIC BLOOD PRESSURE: 126 MMHG | TEMPERATURE: 97.5 F | HEIGHT: 65 IN | DIASTOLIC BLOOD PRESSURE: 53 MMHG | RESPIRATION RATE: 18 BRPM

## 2017-12-14 PROCEDURE — 96523 IRRIG DRUG DELIVERY DEVICE: CPT

## 2017-12-14 PROCEDURE — 700111 HCHG RX REV CODE 636 W/ 250 OVERRIDE (IP)

## 2017-12-14 RX ADMIN — HEPARIN 500 UNITS: 100 SYRINGE at 13:43

## 2017-12-14 ASSESSMENT — PAIN SCALES - GENERAL: PAINLEVEL: NO PAIN

## 2017-12-14 NOTE — PROGRESS NOTES
Pt presents ambulatory for pump de-access. Pt reports no adverse reactions from chemotherapy infusion. Mild nausea reported and pt reports that she will take home prescribed zofran after she leaves today. RN confirmed that infusion completed, reservoir volume 0 mL.  Port flushed and blood return noted, flushed per protocol with saline and heparin, de-accessed and gauze drsg applied to site. Pump cleansed and placed in storage area for pt. Pt to return on 12/26/17, appt confirmed with pt. Pt left ambulatory in no distress at 1356.

## 2017-12-20 RX ORDER — DEXTROSE MONOHYDRATE 50 MG/ML
INJECTION, SOLUTION INTRAVENOUS CONTINUOUS
Status: CANCELLED | OUTPATIENT
Start: 2017-12-26

## 2017-12-20 RX ORDER — DEXAMETHASONE SODIUM PHOSPHATE 4 MG/ML
8 INJECTION, SOLUTION INTRA-ARTICULAR; INTRALESIONAL; INTRAMUSCULAR; INTRAVENOUS; SOFT TISSUE ONCE
Status: CANCELLED | OUTPATIENT
Start: 2018-01-09 | End: 2018-01-10

## 2017-12-20 RX ORDER — LIDOCAINE HYDROCHLORIDE 10 MG/ML
10 INJECTION, SOLUTION INFILTRATION; PERINEURAL ONCE
Status: CANCELLED | OUTPATIENT
Start: 2017-12-26 | End: 2017-12-26

## 2017-12-20 RX ORDER — DEXAMETHASONE SODIUM PHOSPHATE 4 MG/ML
8 INJECTION, SOLUTION INTRA-ARTICULAR; INTRALESIONAL; INTRAMUSCULAR; INTRAVENOUS; SOFT TISSUE ONCE
Status: CANCELLED | OUTPATIENT
Start: 2017-12-26 | End: 2017-12-26

## 2017-12-20 RX ORDER — LIDOCAINE HYDROCHLORIDE 10 MG/ML
10 INJECTION, SOLUTION INFILTRATION; PERINEURAL ONCE
Status: CANCELLED | OUTPATIENT
Start: 2018-01-09 | End: 2018-01-10

## 2017-12-20 RX ORDER — DEXTROSE MONOHYDRATE 50 MG/ML
INJECTION, SOLUTION INTRAVENOUS CONTINUOUS
Status: CANCELLED | OUTPATIENT
Start: 2018-01-09

## 2017-12-20 RX ORDER — ONDANSETRON 8 MG/1
8 TABLET, ORALLY DISINTEGRATING ORAL
Status: CANCELLED | OUTPATIENT
Start: 2017-12-26

## 2017-12-20 RX ORDER — ONDANSETRON 8 MG/1
8 TABLET, ORALLY DISINTEGRATING ORAL
Status: CANCELLED | OUTPATIENT
Start: 2018-01-09

## 2017-12-20 RX ORDER — ONDANSETRON 2 MG/ML
4 INJECTION INTRAMUSCULAR; INTRAVENOUS
Status: CANCELLED | OUTPATIENT
Start: 2018-01-09

## 2017-12-20 RX ORDER — ONDANSETRON 2 MG/ML
4 INJECTION INTRAMUSCULAR; INTRAVENOUS
Status: CANCELLED | OUTPATIENT
Start: 2017-12-26

## 2017-12-20 RX ORDER — PROCHLORPERAZINE MALEATE 10 MG
10 TABLET ORAL EVERY 6 HOURS PRN
Status: CANCELLED | OUTPATIENT
Start: 2017-12-26

## 2017-12-20 RX ORDER — PROCHLORPERAZINE MALEATE 10 MG
10 TABLET ORAL EVERY 6 HOURS PRN
Status: CANCELLED | OUTPATIENT
Start: 2018-01-09

## 2017-12-26 ENCOUNTER — OUTPATIENT INFUSION SERVICES (OUTPATIENT)
Dept: ONCOLOGY | Facility: MEDICAL CENTER | Age: 77
End: 2017-12-26
Attending: INTERNAL MEDICINE
Payer: MEDICARE

## 2017-12-26 ENCOUNTER — OFFICE VISIT (OUTPATIENT)
Dept: HEMATOLOGY ONCOLOGY | Facility: MEDICAL CENTER | Age: 77
End: 2017-12-26
Payer: MEDICARE

## 2017-12-26 VITALS
HEIGHT: 65 IN | SYSTOLIC BLOOD PRESSURE: 116 MMHG | OXYGEN SATURATION: 97 % | HEART RATE: 95 BPM | DIASTOLIC BLOOD PRESSURE: 78 MMHG | BODY MASS INDEX: 28.14 KG/M2 | RESPIRATION RATE: 18 BRPM | TEMPERATURE: 97.1 F | WEIGHT: 168.87 LBS

## 2017-12-26 VITALS
HEART RATE: 83 BPM | BODY MASS INDEX: 28.28 KG/M2 | HEIGHT: 65 IN | RESPIRATION RATE: 18 BRPM | DIASTOLIC BLOOD PRESSURE: 68 MMHG | TEMPERATURE: 98.1 F | OXYGEN SATURATION: 98 % | WEIGHT: 169.75 LBS | SYSTOLIC BLOOD PRESSURE: 125 MMHG

## 2017-12-26 DIAGNOSIS — R94.4 DECREASED GFR: ICD-10-CM

## 2017-12-26 DIAGNOSIS — C20 RECTAL CANCER (HCC): ICD-10-CM

## 2017-12-26 DIAGNOSIS — C78.7 SECONDARY MALIGNANT NEOPLASM OF LIVER (HCC): ICD-10-CM

## 2017-12-26 LAB
BASOPHILS # BLD AUTO: 0.6 % (ref 0–1.8)
BASOPHILS # BLD: 0.03 K/UL (ref 0–0.12)
EOSINOPHIL # BLD AUTO: 0.23 K/UL (ref 0–0.51)
EOSINOPHIL NFR BLD: 4.4 % (ref 0–6.9)
ERYTHROCYTE [DISTWIDTH] IN BLOOD BY AUTOMATED COUNT: 54.3 FL (ref 35.9–50)
HCT VFR BLD AUTO: 38.5 % (ref 37–47)
HGB BLD-MCNC: 12.6 G/DL (ref 12–16)
IMM GRANULOCYTES # BLD AUTO: 0.02 K/UL (ref 0–0.11)
IMM GRANULOCYTES NFR BLD AUTO: 0.4 % (ref 0–0.9)
LYMPHOCYTES # BLD AUTO: 0.98 K/UL (ref 1–4.8)
LYMPHOCYTES NFR BLD: 18.8 % (ref 22–41)
MCH RBC QN AUTO: 32.5 PG (ref 27–33)
MCHC RBC AUTO-ENTMCNC: 32.7 G/DL (ref 33.6–35)
MCV RBC AUTO: 99.2 FL (ref 81.4–97.8)
MONOCYTES # BLD AUTO: 0.6 K/UL (ref 0–0.85)
MONOCYTES NFR BLD AUTO: 11.5 % (ref 0–13.4)
NEUTROPHILS # BLD AUTO: 3.35 K/UL (ref 2–7.15)
NEUTROPHILS NFR BLD: 64.3 % (ref 44–72)
NRBC # BLD AUTO: 0 K/UL
NRBC BLD-RTO: 0 /100 WBC
PLATELET # BLD AUTO: 192 K/UL (ref 164–446)
PMV BLD AUTO: 10.3 FL (ref 9–12.9)
RBC # BLD AUTO: 3.88 M/UL (ref 4.2–5.4)
WBC # BLD AUTO: 5.2 K/UL (ref 4.8–10.8)

## 2017-12-26 PROCEDURE — 700111 HCHG RX REV CODE 636 W/ 250 OVERRIDE (IP)

## 2017-12-26 PROCEDURE — 96375 TX/PRO/DX INJ NEW DRUG ADDON: CPT

## 2017-12-26 PROCEDURE — 85025 COMPLETE CBC W/AUTO DIFF WBC: CPT

## 2017-12-26 PROCEDURE — 306780 HCHG STAT FOR TRANSFUSION PER CASE

## 2017-12-26 PROCEDURE — 700111 HCHG RX REV CODE 636 W/ 250 OVERRIDE (IP): Performed by: NURSE PRACTITIONER

## 2017-12-26 PROCEDURE — 99213 OFFICE O/P EST LOW 20 MIN: CPT | Performed by: NURSE PRACTITIONER

## 2017-12-26 PROCEDURE — G0498 CHEMO EXTEND IV INFUS W/PUMP: HCPCS

## 2017-12-26 PROCEDURE — 96374 THER/PROPH/DIAG INJ IV PUSH: CPT

## 2017-12-26 PROCEDURE — A4212 NON CORING NEEDLE OR STYLET: HCPCS

## 2017-12-26 RX ORDER — DEXAMETHASONE SODIUM PHOSPHATE 4 MG/ML
8 INJECTION, SOLUTION INTRA-ARTICULAR; INTRALESIONAL; INTRAMUSCULAR; INTRAVENOUS; SOFT TISSUE ONCE
Status: COMPLETED | OUTPATIENT
Start: 2017-12-26 | End: 2017-12-26

## 2017-12-26 RX ADMIN — HEPARIN 500 UNITS: 100 SYRINGE at 09:56

## 2017-12-26 RX ADMIN — DEXAMETHASONE SODIUM PHOSPHATE 8 MG: 4 INJECTION, SOLUTION INTRAMUSCULAR; INTRAVENOUS at 11:25

## 2017-12-26 RX ADMIN — FLUOROURACIL 3610 MG: 50 INJECTION, SOLUTION INTRAVENOUS at 12:09

## 2017-12-26 ASSESSMENT — ENCOUNTER SYMPTOMS
CONSTIPATION: 0
CHILLS: 0
TINGLING: 0
PALPITATIONS: 0
SHORTNESS OF BREATH: 1
FEVER: 0
DIARRHEA: 0
COUGH: 1
WHEEZING: 0
VOMITING: 0
WEIGHT LOSS: 0
NAUSEA: 0
MYALGIAS: 0
DIZZINESS: 0
HEADACHES: 0

## 2017-12-26 ASSESSMENT — PAIN SCALES - GENERAL
PAINLEVEL: NO PAIN

## 2017-12-26 NOTE — PROGRESS NOTES
Pharmacy Chemotherapy Verification Note:    Patient Name: Karlene Walters      Dx: Colon CA        Cycle 51 Previous treatment: C50 = 12/12/17    Protocol: 5-FU+Leucovorin        *Dosing Reference*   IV over 2 hours on Day 1, followed by   IVP on Day 1, followed by    - Dose reduced by 20% to 320 mg/m² starting with Cycle 18 ([place] #11) on 7/5/16 due to neutropenia    10/31/17: delete Leucovorin and 5-FU push d/t neutropenia per Dr. Hardy   Fluorouracil 1200 mg/m²  IV continuous infusion daily on Days 1 and 2 (2400 mg/m² IV over 46-48 hours) -    - Infusion Dose reduced by 20% starting with Cycle 18 ([place] #11) on 7/5/16 due to neutropenia   -20% reduction (1920 mg/m2) to be continued starting C28 per C Alsop APRN   14 day cycles for 12 cycles (adjuvant) or until disease progression or unacceptable toxicity     NCCN Guidelines for Colon Cancer. V.2.2015.  Jay T, et al. Eur J Cancer.1999;35(9):1343-7.      Allergies:  Codeine; Oxaliplatin; Pcn; Sulfa drugs; and Tape       Ht = 65.35 in  Wt = 76.6 kg  BSA = 1.88 m2      Labs 12/26/17:  ANC~ 3350   Plt = 192k Hgb = 12.6    Labs 12/12/17  SCr = 0.94 mg/dL CrCl ~ 61 mL/min LFT = WNL except AP = 212  TBili = 0.9     Fluorouracil (5-FU) 1920 mg/m²/dose x 1.88 m² = 3610 mg              <5% difference, OK to treat with final written dose = 3610 mg CIVI               via CADD pump at 1.6 ml/hr x 46 hour       Lalit Andrade, PharmD, BCOP

## 2017-12-26 NOTE — PROGRESS NOTES
Pharmacy Chemotherapy Verification Note:    Patient Name: Karlene Walters      Dx: Colon CA        Protocol: 5-FU+Leucovorin       IV over 2 hours on Day 1, followed by   IVP on Day 1, followed by    - Dose reduced by 20% to 320 mg/m² starting with Cycle 18 ([place] #11) on 7/5/16 due to neutropenia    10/31/17: delete Leucovorin and 5-FU push d/t neutropenia per Dr. Hardy   Fluorouracil 1200 mg/m²  IV continuous infusion daily on Days 1 and 2 (2400 mg/m² IV over 46-48 hours) -    - Infusion Dose reduced by 20% starting with Cycle 18 ([place] #11) on 7/5/16 due to neutropenia   -20% reduction (1920 mg/m2) to be continued starting C28 per C Alsop APRN   14 day cycles for 12 cycles (adjuvant) or until disease progression or unacceptable toxicity     NCCN Guidelines for Colon Cancer. V.2.2015.  Jay GROVER, et al. Eur J Cancer.1999;35(9):1343-7.      Allergies:  Codeine; Oxaliplatin; Pcn; Sulfa drugs; and Tape       There were no vitals taken for this visit. There is no height or weight on file to calculate BSA.    ANC~ 3350 Plt = 192k   Hgb = 12.6       12/12/17:   SCr = 0.94mg/dL CrCl ~ 61mL/min  LFT's = 34/28/212 TBili = 0.9    Drug Order   (Drug name, dose, route, IV Fluid & volume, frequency, number of doses) Cycle 51   Previous treatment: C50 = 12/12/17     Medication = Fluorouracil (5-FU)   Base Dose = 1920 mg/m²/dose  Calc Dose:Base Dose x 1.88 m² = 3609.6 mg  Final Dose = 3610 mg   Route = CIVI  Conc = 50 mg/mL = 72.2 mL   Fluid & Volume = CADD pump 72.2 mL (+ 3 mL overfill = 75.2 mL)  Admin Duration = Over 46 hours  Rate = 1.6 mL/hour   Via CADD pump for home infusion        <5% difference, ok to treat with final dose     By my signature below, I confirm this process was performed independently with the BSA and all final chemotherapy dosing calculations congruent. I have reviewed the above chemotherapy order and that my calculation of the final dose and BSA (when applicable) corroborate those calculations of  the  pharmacist.     Migue PaulsonD

## 2017-12-26 NOTE — PROGRESS NOTES
Pt to infusion services ambulatory per self and accompanied by spouse.  Pt here for port access and scheduled labs prior to chemotherapy.  Plan of care reviewed.  Pt verbalizes understanding.  Telephone call to ANDREW Krishnan and confirmation received to only draw CBC today.  Pt with left chest port with EMLA cream in place.  Port site cleansed and port accessed in sterile fashion.  Port flushes easily; +blood return verified. CBC drawn per MD orders.  Port flushed with normal saline and heparin per policy.  Port remains accessed for chemotherapy later this morning.  Pt released to self care and care of spouse in no apparent distress after completion of treatment, ambulatory.  Pt to f/u with MD at this time and then return for treatment at 11:00; next appointment scheduled.  Blood sample sent to lab.

## 2017-12-26 NOTE — PROGRESS NOTES
Call placed to nurse navigation to request new navigator for f/u requests.  Spoke with CORIN Rowe RN.  She will assume duties.

## 2017-12-26 NOTE — PROGRESS NOTES
Patient arrived for 5FU CADD pump access; previously in Infusion this morning for lab draw prior to MD appt.  Lab results reviewed, within parameters.  Port previously accessed, flushed with brisk blood return.  Pre-med given. 5FU CADD pump connected, running.  Confirmed pt's next appt in two days for disconnect, confirmed pt has home chemo spill kit.  Pt discharged home with  in no apparent distress.

## 2017-12-26 NOTE — PROGRESS NOTES
"Subjective:      Karlene Walters is a 77 y.o. female who presents with Labs Only (prechemo) for rectal cancer.         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 She is accompanied by her  for today's visit. Patient is due to receive single agent 5-FU today.     Patient is doing well and tolerating treatment well. She denies any fevers, chills or appetite loss. Her weight is stable. She does have fatigue intermittently. She denies any chest pain, heart palpitations. She has mild swelling of her lower extremities and takes Lasix per cardiology only as needed. She states she is taking it about every 2 weeks. She does still have a mild cough at times. She does still have shortness of breath but it resolves with inhalers. She established care with pulmonary regarding her shortness of breath. She denies any wheezing. She denies nausea, vomiting, diarrhea or constipation. Her output via her ostomy is stable. She has noticed some mild bleeding from her stoma at times, more so when she nicks it when applying her appliance. She also notices it more so with water as well. She is on Xarelto for previous pulmonary emboli, which is likely the cause of the bleeding of her stoma. She is voiding without difficulty. She states sometimes she will notice some mild dysuria, but more so when she is dehydrated. She denies any pain, headaches, dizziness or lightheadedness.      Allergies   Allergen Reactions   • Codeine      \"gets drunk\"   • Oxaliplatin Anaphylaxis   • Pcn [Penicillins] Itching   • Sulfa Drugs Itching   • Tape Rash     PAPER TAPE OK     Current Outpatient Prescriptions on File Prior to Visit   Medication Sig Dispense Refill   • lidocaine-prilocaine (EMLA) 2.5-2.5 % Cream APPLY A THICK FILM OVER THE PORT ACCESS SITE PRIOR TO ACCESS 30 g 2   • oxycodone immediate-release (ROXICODONE) 5 MG Tab Take 1 Tab by mouth every four hours as needed for Severe Pain. 30 Tab 0   • Tiotropium " Bromide-Olodaterol (STIOLTO RESPIMAT) 2.5-2.5 MCG/ACT Aero Soln Take 2 Puffs by mouth every day. 1 Inhaler 11   • albuterol (PROAIR HFA) 108 (90 Base) MCG/ACT Aero Soln inhalation aerosol Inhale 2 Puffs by mouth every four hours as needed for Shortness of Breath (wheezing). 1 Inhaler 11   • furosemide (LASIX) 40 MG Tab Take 1 Tab by mouth every day. 30 Tab 11   • ranitidine (ZANTAC) 150 MG Tab TAKE 1 TABLET 2 TIMES A DAY. 60 Tab 3   • XARELTO 20 MG Tab tablet TAKE 1 TABLET WITH DINNER. 30 Tab 6   • potassium chloride ER (KLOR-CON) 10 MEQ tablet Take 1 Tab by mouth every day. 30 Tab 3   • loperamide (IMODIUM) 1 MG/5ML Liquid Take 2 mg by mouth 4 times a day as needed for Diarrhea.     • ondansetron (ZOFRAN) 4 MG Tab tablet      • cyanocobalamin (VITAMIN B-12) 500 MCG Tab Take 500 mcg by mouth every day.     • Multiple Vitamins-Minerals (CENTRUM SILVER PO) Take  by mouth.     • Cholecalciferol (VITAMIN D3) 400 UNITS CAPS Take 1 Cap by mouth every day.     • loratadine (CLARITIN) 10 MG TABS Take 10 mg by mouth every day. Indications: Hayfever       Current Facility-Administered Medications on File Prior to Visit   Medication Dose Route Frequency Provider Last Rate Last Dose   • heparin pf injection 500 Units  500 Units Intracatheter PRN Ella De, A.P.N.   500 Units at 08/22/17 0856     Review of Systems   Constitutional: Positive for malaise/fatigue (intermittent). Negative for chills, fever and weight loss.   Respiratory: Positive for cough (mild at times) and shortness of breath. Negative for wheezing.    Cardiovascular: Positive for leg swelling (mild at times and takes Lasix per cardiology. Takes a dose every 2 weeks or so. ). Negative for chest pain and palpitations.   Gastrointestinal: Negative for constipation, diarrhea, nausea and vomiting.   Genitourinary: Negative for dysuria.   Musculoskeletal: Negative for myalgias.   Neurological: Negative for dizziness, tingling and headaches.          Objective:  "    /78   Pulse 95   Temp 36.2 °C (97.1 °F)   Resp 18   Ht 1.66 m (5' 5.35\")   Wt 76.6 kg (168 lb 14 oz)   SpO2 97%   Breastfeeding? No   BMI 27.80 kg/m²      Physical Exam   Constitutional: She is oriented to person, place, and time. She appears well-developed and well-nourished.   HENT:   Head: Normocephalic and atraumatic.   Mouth/Throat: Oropharynx is clear and moist. No oropharyngeal exudate.   Eyes: Conjunctivae and EOM are normal. Pupils are equal, round, and reactive to light. Right eye exhibits no discharge. Left eye exhibits no discharge. No scleral icterus.   Cardiovascular: Normal rate, regular rhythm, normal heart sounds and intact distal pulses.  Exam reveals no gallop and no friction rub.    No murmur heard.  Pulmonary/Chest: Effort normal and breath sounds normal. No respiratory distress. She has no wheezes.   Abdominal: Soft. Bowel sounds are normal. She exhibits no distension. There is no tenderness.   Musculoskeletal: Normal range of motion. She exhibits no edema or tenderness.   Neurological: She is alert and oriented to person, place, and time.   Skin: Skin is warm and dry. No rash noted. She is not diaphoretic. No erythema. No pallor.   Psychiatric: She has a normal mood and affect. Her behavior is normal.   Vitals reviewed.      Outpatient Infusion Services on 12/26/2017   Component Date Value Ref Range Status   • WBC 12/26/2017 5.2  4.8 - 10.8 K/uL Final   • RBC 12/26/2017 3.88* 4.20 - 5.40 M/uL Final   • Hemoglobin 12/26/2017 12.6  12.0 - 16.0 g/dL Final   • Hematocrit 12/26/2017 38.5  37.0 - 47.0 % Final   • MCV 12/26/2017 99.2* 81.4 - 97.8 fL Final   • MCH 12/26/2017 32.5  27.0 - 33.0 pg Final   • MCHC 12/26/2017 32.7* 33.6 - 35.0 g/dL Final   • RDW 12/26/2017 54.3* 35.9 - 50.0 fL Final   • Platelet Count 12/26/2017 192  164 - 446 K/uL Final   • MPV 12/26/2017 10.3  9.0 - 12.9 fL Final   • Neutrophils-Polys 12/26/2017 64.30  44.00 - 72.00 % Final   • Lymphocytes 12/26/2017 " 18.80* 22.00 - 41.00 % Final   • Monocytes 12/26/2017 11.50  0.00 - 13.40 % Final   • Eosinophils 12/26/2017 4.40  0.00 - 6.90 % Final   • Basophils 12/26/2017 0.60  0.00 - 1.80 % Final   • Immature Granulocytes 12/26/2017 0.40  0.00 - 0.90 % Final   • Nucleated RBC 12/26/2017 0.00  /100 WBC Final   • Neutrophils (Absolute) 12/26/2017 3.35  2.00 - 7.15 K/uL Final   • Lymphs (Absolute) 12/26/2017 0.98* 1.00 - 4.80 K/uL Final   • Monos (Absolute) 12/26/2017 0.60  0.00 - 0.85 K/uL Final   • Eos (Absolute) 12/26/2017 0.23  0.00 - 0.51 K/uL Final   • Baso (Absolute) 12/26/2017 0.03  0.00 - 0.12 K/uL Final   • Immature Granulocytes (abs) 12/26/2017 0.02  0.00 - 0.11 K/uL Final   • NRBC (Absolute) 12/26/2017 0.00  K/uL Final          Assessment/Plan:     1. Rectal cancer (CMS-HCC)     2. Secondary malignant neoplasm of liver (CMS-HCC)       Plan  1. Patient is scheduled to receive single agent 5-FU today. She has been tolerating treatment very well. I did review her CBC which is appropriate to proceed with treatment today as planned.    2. Patient does have some mild bleeding from the stoma at times. She is to monitor that and contact us if the bleeding worsens. This is likely related to Xarelto.    3. Patient to follow-up again in one month or sooner if needed. She will continue with treatment every 2 weeks as planned.

## 2017-12-26 NOTE — PROGRESS NOTES
Chemotherapy Verification - PRIMARY RN      Height = 166cm  Weight = 76.6kg  BSA = 1.88m2       Medication: 5FU pump  Dose: 1920mg/m2  Calculated Dose: 3609.6mg CADD pump                             (In mg/m2, AUC, mg/kg)     I confirm this process was performed independently with the BSA and all final chemotherapy dosing calculations congruent.  Any discrepancies of 5% or greater have been addressed with the chemotherapy pharmacist. The resolution of the discrepancy has been documented in the EPIC progress notes.

## 2017-12-26 NOTE — PROGRESS NOTES
Chemotherapy Verification - SECONDARY RN       Height = 166 cm  Weight = 76.6 kg  BSA = 1.879       Medication: home infusion 5FU  Dose: 1920 mg/m2  Calculated Dose: 3608.4 mg over 46 hr                             (In mg/m2, AUC, mg/kg)       I confirm that this process was performed independently.

## 2017-12-27 ENCOUNTER — PATIENT OUTREACH (OUTPATIENT)
Dept: OTHER | Facility: MEDICAL CENTER | Age: 77
End: 2017-12-27

## 2017-12-27 NOTE — PROGRESS NOTES
On 12/27/2017 at 1001 this ONN called patient. Introduced self and explained nurse navigation. Patient stated she would like to speak with a dietitian. Patient stated she did not have a full understanding of prognosis and diagnosis and would like to meet in person at next appointment. Patient stated she had concerns about her declining health, per patient patient was recently diagnosed with COPD. Patient stated her  has COPD/CHF and may not be able to assist patient as much in the future. Patient agreeable to this ONN meeting with patient at next infusion appointment on 1/9/2017. Patient stated that patient does have this ONN's contact information if patient needs to call this ONN before appointment. Encouraged patient to call this ONN if needed.

## 2017-12-28 ENCOUNTER — OUTPATIENT INFUSION SERVICES (OUTPATIENT)
Dept: ONCOLOGY | Facility: MEDICAL CENTER | Age: 77
End: 2017-12-28
Attending: INTERNAL MEDICINE
Payer: MEDICARE

## 2017-12-28 VITALS
HEIGHT: 65 IN | WEIGHT: 173.06 LBS | HEART RATE: 83 BPM | BODY MASS INDEX: 28.83 KG/M2 | RESPIRATION RATE: 18 BRPM | DIASTOLIC BLOOD PRESSURE: 76 MMHG | TEMPERATURE: 96.7 F | SYSTOLIC BLOOD PRESSURE: 128 MMHG | OXYGEN SATURATION: 100 %

## 2017-12-28 DIAGNOSIS — C20 RECTAL CANCER (HCC): ICD-10-CM

## 2017-12-28 DIAGNOSIS — R94.4 DECREASED GFR: ICD-10-CM

## 2017-12-28 PROCEDURE — 700111 HCHG RX REV CODE 636 W/ 250 OVERRIDE (IP)

## 2017-12-28 PROCEDURE — 96523 IRRIG DRUG DELIVERY DEVICE: CPT

## 2017-12-28 RX ADMIN — HEPARIN 500 UNITS: 100 SYRINGE at 12:00

## 2017-12-28 ASSESSMENT — PAIN SCALES - GENERAL: PAINLEVEL: NO PAIN

## 2017-12-28 NOTE — PROGRESS NOTES
Patient arrived to clinic for 5FU pump de-access.  Denies any changes since previous visit.  CADD pump clamped and removed, confirmed infusion complete, reservoir volume 0ml.  Port flushed with blood return noted, heparin instilled, and port de-accessed per protocol.  Gauze/tape dressing placed.  Future appointment confirmed and pt ambulated out of clinic in no apparent distress.

## 2018-01-09 ENCOUNTER — PATIENT OUTREACH (OUTPATIENT)
Dept: OTHER | Facility: MEDICAL CENTER | Age: 78
End: 2018-01-09

## 2018-01-09 ENCOUNTER — OUTPATIENT INFUSION SERVICES (OUTPATIENT)
Dept: ONCOLOGY | Facility: MEDICAL CENTER | Age: 78
End: 2018-01-09
Attending: INTERNAL MEDICINE
Payer: MEDICARE

## 2018-01-09 ENCOUNTER — DOCUMENTATION (OUTPATIENT)
Dept: HEMATOLOGY ONCOLOGY | Facility: MEDICAL CENTER | Age: 78
End: 2018-01-09

## 2018-01-09 VITALS
SYSTOLIC BLOOD PRESSURE: 123 MMHG | HEIGHT: 65 IN | TEMPERATURE: 98.2 F | DIASTOLIC BLOOD PRESSURE: 74 MMHG | HEART RATE: 95 BPM | BODY MASS INDEX: 28.54 KG/M2 | WEIGHT: 171.3 LBS | RESPIRATION RATE: 18 BRPM | OXYGEN SATURATION: 95 %

## 2018-01-09 DIAGNOSIS — C20 RECTAL CANCER (HCC): ICD-10-CM

## 2018-01-09 DIAGNOSIS — R94.4 DECREASED GFR: ICD-10-CM

## 2018-01-09 LAB
ALBUMIN SERPL BCP-MCNC: 3.4 G/DL (ref 3.2–4.9)
ALBUMIN/GLOB SERPL: 1.4 G/DL
ALP SERPL-CCNC: 195 U/L (ref 30–99)
ALT SERPL-CCNC: 53 U/L (ref 2–50)
ANION GAP SERPL CALC-SCNC: 9 MMOL/L (ref 0–11.9)
AST SERPL-CCNC: 57 U/L (ref 12–45)
BASOPHILS # BLD AUTO: 1.7 % (ref 0–1.8)
BASOPHILS # BLD: 0.07 K/UL (ref 0–0.12)
BILIRUB SERPL-MCNC: 1.1 MG/DL (ref 0.1–1.5)
BUN SERPL-MCNC: 17 MG/DL (ref 8–22)
CALCIUM SERPL-MCNC: 9.2 MG/DL (ref 8.4–10.2)
CEA SERPL-MCNC: 1.8 NG/ML (ref 0–3)
CHLORIDE SERPL-SCNC: 105 MMOL/L (ref 96–112)
CO2 SERPL-SCNC: 23 MMOL/L (ref 20–33)
CREAT SERPL-MCNC: 1.16 MG/DL (ref 0.5–1.4)
EOSINOPHIL # BLD AUTO: 0.21 K/UL (ref 0–0.51)
EOSINOPHIL NFR BLD: 5 % (ref 0–6.9)
ERYTHROCYTE [DISTWIDTH] IN BLOOD BY AUTOMATED COUNT: 50.9 FL (ref 35.9–50)
GLOBULIN SER CALC-MCNC: 2.4 G/DL (ref 1.9–3.5)
GLUCOSE SERPL-MCNC: 98 MG/DL (ref 65–99)
HCT VFR BLD AUTO: 39.4 % (ref 37–47)
HGB BLD-MCNC: 12.8 G/DL (ref 12–16)
IMM GRANULOCYTES # BLD AUTO: 0.01 K/UL (ref 0–0.11)
IMM GRANULOCYTES NFR BLD AUTO: 0.2 % (ref 0–0.9)
LYMPHOCYTES # BLD AUTO: 1.13 K/UL (ref 1–4.8)
LYMPHOCYTES NFR BLD: 27.1 % (ref 22–41)
MCH RBC QN AUTO: 31.4 PG (ref 27–33)
MCHC RBC AUTO-ENTMCNC: 32.5 G/DL (ref 33.6–35)
MCV RBC AUTO: 96.8 FL (ref 81.4–97.8)
MONOCYTES # BLD AUTO: 0.73 K/UL (ref 0–0.85)
MONOCYTES NFR BLD AUTO: 17.5 % (ref 0–13.4)
NEUTROPHILS # BLD AUTO: 2.02 K/UL (ref 2–7.15)
NEUTROPHILS NFR BLD: 48.5 % (ref 44–72)
NRBC # BLD AUTO: 0 K/UL
NRBC BLD-RTO: 0 /100 WBC
PLATELET # BLD AUTO: 214 K/UL (ref 164–446)
PMV BLD AUTO: 9.9 FL (ref 9–12.9)
POTASSIUM SERPL-SCNC: 3.7 MMOL/L (ref 3.6–5.5)
PROT SERPL-MCNC: 5.8 G/DL (ref 6–8.2)
RBC # BLD AUTO: 4.07 M/UL (ref 4.2–5.4)
SODIUM SERPL-SCNC: 137 MMOL/L (ref 135–145)
WBC # BLD AUTO: 4.2 K/UL (ref 4.8–10.8)

## 2018-01-09 PROCEDURE — 85025 COMPLETE CBC W/AUTO DIFF WBC: CPT

## 2018-01-09 PROCEDURE — 96375 TX/PRO/DX INJ NEW DRUG ADDON: CPT

## 2018-01-09 PROCEDURE — G0498 CHEMO EXTEND IV INFUS W/PUMP: HCPCS

## 2018-01-09 PROCEDURE — 80053 COMPREHEN METABOLIC PANEL: CPT

## 2018-01-09 PROCEDURE — A4212 NON CORING NEEDLE OR STYLET: HCPCS

## 2018-01-09 PROCEDURE — 96374 THER/PROPH/DIAG INJ IV PUSH: CPT | Mod: XU

## 2018-01-09 PROCEDURE — 700111 HCHG RX REV CODE 636 W/ 250 OVERRIDE (IP): Performed by: NURSE PRACTITIONER

## 2018-01-09 PROCEDURE — 82378 CARCINOEMBRYONIC ANTIGEN: CPT

## 2018-01-09 RX ORDER — DEXAMETHASONE SODIUM PHOSPHATE 4 MG/ML
8 INJECTION, SOLUTION INTRA-ARTICULAR; INTRALESIONAL; INTRAMUSCULAR; INTRAVENOUS; SOFT TISSUE ONCE
Status: COMPLETED | OUTPATIENT
Start: 2018-01-09 | End: 2018-01-09

## 2018-01-09 RX ADMIN — DEXAMETHASONE SODIUM PHOSPHATE 8 MG: 4 INJECTION, SOLUTION INTRAMUSCULAR; INTRAVENOUS at 11:59

## 2018-01-09 RX ADMIN — FLUOROURACIL 3630 MG: 50 INJECTION, SOLUTION INTRAVENOUS at 12:33

## 2018-01-09 ASSESSMENT — PAIN SCALES - GENERAL: PAINLEVEL: NO PAIN

## 2018-01-09 NOTE — PROGRESS NOTES
"Nutrition Services: RD consultation     Dx: metastatic rectal cancer to liver  PMHx: tonsillitis, renal insufficiency, PNA, pulmonary obstruction, mumps, influenza, HTN, hypercholesterolemia, arrhythmia     Ht: 65\" Weight: 78.6kg (173#) BMI: 29 IBW: 56.8kg (125#) % IBW: 138% UBW: 72.7kg (160#) per pt report    Labs (1/9): glucose: 98 Na: 137 K: 3.7 AST: 57 ALT: 53 Alk Phos: 195 albumin: 3.4    76y/o F is currently on chemotherapy treatment 2' presents w/ metastatic rectal cancer to liver w/ extensive past medical hx as listed above per H&P. Therefore, met w/ pt today to discuss current intake and appetite. Pt reports that she has been receiving chemotherapy for the past two years and has not seen a dietitian in the past. She reports that she has been struggling w/ wt gain since her UBW is ~160#, pt is currently stable @ CBW of 173# x 3 months. Pt reports that she has been on ongoing steroid treatment and has a good appetite w/ no current c/o GI distress, difficulty swallowing, or unintentional wt loss at this time. Pt does have fluctuating changes in taste; however, it has not inhibited or posed a barrier in her adequately eating. Pt states that she would like to go back to \"[employer]\" to lose weight towards her IBW.     Recommendations:  1) provided pt w/ tips to ensure adequate PO intake if side effects of treatment should occur, encouraged pt to add \"tart foods\" such as citrus fruits, lemon/lime juice drop wise for improved taste. Or adding sweeteners such as maple syrup, jelly, or honey can help improve taste, or fats such as nut butters and avocados. Also preparing foods w/ herbs and seasonings, and/or garlic and onions, vinegar should benefit w/ extreme taste changes.   2) encouraged pt that Weight [employer]chers sometimes will focus more so on caloric intake and provides recommendations for the general public not those with chronic illnesses. Therefore, encouraged pt to focus on the quality of foods that she " is consuming, especially foods that are higher in protein which should promote early satiety and preservation of lean body mass and promote wt management.   3) pt to also benefit from light/moderate physical activity 3-5x/week to also further promote wt maintenance.     Pt receptive and reports understanding w/ no further questions at this time. RD to continue to monitor PO adequacy and wt status to leave further recommendations accordingly.     Nutrition Needs Assessment:     Total Daily Calorie Needs per MSJ x1.2 1300 1600   Total Daily Protein Needs (1.2-1.4 g/kg) 94 110   Daily Fluids 30 mL per Act BW) 2400

## 2018-01-09 NOTE — PROGRESS NOTES
Chemotherapy Verification - PRIMARY RN      Height = 166cm  Weight = 77.7kg  BSA = 1.89m2       Medication: 5FU  Dose: 1920mg/m2 over 46 hours  Calculated Dose: 3628.8mg over 46 hours                            (In mg/m2, AUC, mg/kg)       I confirm this process was performed independently with the BSA and all final chemotherapy dosing calculations congruent.  Any discrepancies of 5% or greater have been addressed with the chemotherapy pharmacist. The resolution of the discrepancy has been documented in the EPIC progress notes.

## 2018-01-09 NOTE — PROGRESS NOTES
"Pharmacy Chemotherapy Verification Note:    Patient Name: Karlene Walters      Dx: Colon CA        Cycle 52 Previous treatment: C51 = 12/26/17    Protocol: 5-FU+Leucovorin        *Dosing Reference*   IV over 2 hours on Day 1, followed by   IVP on Day 1, followed by    - Dose reduced by 20% to 320 mg/m² starting with Cycle 18 ([place] #11) on 7/5/16 due to neutropenia    10/31/17: delete Leucovorin and 5-FU push d/t neutropenia per Dr. Hardy   Fluorouracil 1200 mg/m²  IV continuous infusion daily on Days 1 and 2 (2400 mg/m² IV over 46-48 hours) -    - Infusion Dose reduced by 20% starting with Cycle 18 ([place] #11) on 7/5/16 due to neutropenia   -20% reduction (1920 mg/m2) to be continued starting C28 per C Alsop APRN   14 day cycles for 12 cycles (adjuvant) or until disease progression or unacceptable toxicity     NCCN Guidelines for Colon Cancer. V.2.2015.  Jay T, et al. Eur J Cancer.1999;35(9):1343-7.      Allergies:  Codeine; Oxaliplatin; Pcn; Sulfa drugs; and Tape       /74   Pulse 95   Temp 36.8 °C (98.2 °F)   Resp 18   Ht 1.66 m (5' 5.35\")   Wt 77.7 kg (171 lb 4.8 oz)   SpO2 95%   BMI 28.20 kg/m²    Body surface area is 1.89 meters squared.      ANC~ 2020 Plt = 149k   Hgb = 12.8     SCr = 1.16mg/dL CrCl ~ 50mL/min   LFT's = 57/53/195 TBili = 1.1        Fluorouracil (5-FU) 1920 mg/m²/dose x 1.89m² = 3628.8mg              <5% difference, OK to treat with final written dose = 3630 mg CIVI               via CADD pump at 1.6ml/hr x 46 hour       Nakia Arroyo, PharmD    "

## 2018-01-09 NOTE — PROGRESS NOTES
"Pharmacy Chemotherapy Verification Note:    Patient Name: Karlene Walters      Dx: Colon CA        Protocol: 5-FU+Leucovorin       IV over 2 hours on Day 1, followed by   IVP on Day 1, followed by    - Dose reduced by 20% to 320 mg/m² starting with Cycle 18 ([place] #11) on 7/5/16 due to neutropenia    10/31/17: delete Leucovorin and 5-FU push d/t neutropenia per Dr. Hardy   Fluorouracil 1200 mg/m²  IV continuous infusion daily on Days 1 and 2 (2400 mg/m² IV over 46-48 hours) -    - Infusion Dose reduced by 20% starting with Cycle 18 ([place] #11) on 7/5/16 due to neutropenia   -20% reduction (1920 mg/m2) to be continued starting C28 per C Alsop APRN   14 day cycles for 12 cycles (adjuvant) or until disease progression or unacceptable toxicity     NCCN Guidelines for Colon Cancer. V.2.2015.  Jay T, et al. Eur J Cancer.1999;35(9):1343-7.      Allergies:  Codeine; Oxaliplatin; Pcn; Sulfa drugs; and Tape       /74   Pulse 95   Temp 36.8 °C (98.2 °F)   Resp 18   Ht 1.66 m (5' 5.35\")   Wt 77.7 kg (171 lb 4.8 oz)   SpO2 95%   BMI 28.20 kg/m²  Body surface area is 1.89 meters squared.    Labs 1/9/18:  ANC~ 2020 Plt = 214k   Hgb = 12.8     SCr = 1.16 mg/dL CrCl ~ 50 mL/min  K+ = 3.7   AST/ALT/AP = 57/53/195 (no dose adj. recommended) TBili = 1.1      Drug Order   (Drug name, dose, route, IV Fluid & volume, frequency, number of doses) Cycle 52   Previous treatment: C51 = 12/26/17     Medication = Fluorouracil (5-FU)   Base Dose = 1920 mg/m²/dose  Calc Dose:Base Dose x 1.89 m² = 3628.8mg  Final Dose = 3630 mg   Route = CIVI  Conc = 50 mg/mL = 72.6 mL   Fluid & Volume = CADD pump 72.6 mL (+ 3 mL overfill = 75.6 mL)  Admin Duration = Over 46 hours  Rate = 1.6 mL/hour   Via CADD pump for home infusion        <5% difference, ok to treat with final dose     By my signature below, I confirm this process was performed independently with the BSA and all final chemotherapy dosing calculations congruent. I have " reviewed the above chemotherapy order and that my calculation of the final dose and BSA (when applicable) corroborate those calculations of the  pharmacist.     Sofiya Pedro, PharmD, BCOP

## 2018-01-09 NOTE — PROGRESS NOTES
Patient arrived to clinic for 5FU CADD pump connect.  Port accessed using sterile technique, brisk blood return noted.  Labs drawn per order.  Pre meds given.  5FU CADD pump connected by Daysi Persaud.  Next appointment confirmed and pt ambulated out of clinic in no apparent distress.

## 2018-01-09 NOTE — PROGRESS NOTES
On 1/9/2018 at 1100 this ONN met with patient in Infusion Services. Patient is accompanied by self for appointment. Introduced self and concept of nurse navigation. Patient stated her  was at a doctor's appointment in [facility]. Patient requesting information on colon cancer with liver mets. Provided patient with print out from NCCN Guidelines for Patients: Colon Cancer. Highlighted information about colon cancer with liver mets and discussed information with patient. Patient states she is feeling overwhelmed by all the information patient has been given. Patient agreed that NCCN information seemed helpful to patient. Patient states her daughter is supportive and will be interested in the information as well. Patient states that her daughter has had a colonoscopy since patient's diagnosis. Patient and patient's  live together in [place]. Patient states that her and her  have a trailer parked in [place] that they stay in when they come into town for medical appointments. Patient denies financial concerns at this time, as far as groceries and utilities, however patient does state she has to meet her deductible again now that it is a new year. Discussed with patient [employer], patient states she has not spoken to one. NICOLE Nguyen came to meet with patient, however this ONN was still present, and NICOLE Nguyen stated she would come back to speak with patient later. Patient rates distress at 3/10. Patient states her biggest concern stems from the fact that patient's  has COPD and CHF. Patient stated she is also worried about her 13 year old great granddaughter, who, per patient recently had surgery. Discussed with patient and provided Support Group Calendar, [employer] flyer, [employer] flyer, [employer] flyer, and this ONN's business card. Patient denies other questions or concerns at this time. Encouraged patient to call this ONN if needs arise.

## 2018-01-09 NOTE — PROGRESS NOTES
Chemotherapy Verification - SECONDARY RN       Height = 166 cm  Weight = 77.7 kg  BSA = 1.89 m2       Medication: Fluorouracil home pump  Dose: 1920 mg/m2  Calculated Dose: 3628 mg over 46 hours                               I confirm that this process was performed independently.

## 2018-01-10 ENCOUNTER — OFFICE VISIT (OUTPATIENT)
Dept: PULMONOLOGY | Facility: HOSPICE | Age: 78
End: 2018-01-10
Payer: MEDICARE

## 2018-01-10 ENCOUNTER — TELEPHONE (OUTPATIENT)
Dept: HEMATOLOGY ONCOLOGY | Facility: MEDICAL CENTER | Age: 78
End: 2018-01-10

## 2018-01-10 VITALS
WEIGHT: 174 LBS | BODY MASS INDEX: 28.99 KG/M2 | DIASTOLIC BLOOD PRESSURE: 89 MMHG | HEIGHT: 65 IN | RESPIRATION RATE: 16 BRPM | HEART RATE: 89 BPM | SYSTOLIC BLOOD PRESSURE: 122 MMHG | OXYGEN SATURATION: 97 %

## 2018-01-10 DIAGNOSIS — Z86.711 HISTORY OF PULMONARY EMBOLISM: ICD-10-CM

## 2018-01-10 DIAGNOSIS — J43.9 PULMONARY EMPHYSEMA, UNSPECIFIED EMPHYSEMA TYPE (HCC): ICD-10-CM

## 2018-01-10 DIAGNOSIS — C78.7 SECONDARY MALIGNANT NEOPLASM OF LIVER (HCC): ICD-10-CM

## 2018-01-10 DIAGNOSIS — R06.02 SHORTNESS OF BREATH: ICD-10-CM

## 2018-01-10 PROCEDURE — 99214 OFFICE O/P EST MOD 30 MIN: CPT | Performed by: NURSE PRACTITIONER

## 2018-01-10 NOTE — PROGRESS NOTES
Chief Complaint   Patient presents with   • Follow-Up       HPI:  Karlene Walters is a 77 y.o. year old female here today for follow-up on dyspnea on exertion.  History of rectal cancer with metastasis to the liver. Patient is currently under the care of Dr. Hardy. She continues to receive chemotherapy. She said complaints of dyspnea on exertion with no clear etiology. CT scan of chest showed no evidence of PE or masses or nodules. Hx of PE. PFT indicated normal spirometry with mild air trapping and mildly reduced diffusion capacity. PT 9/15/2017 indicated FEV1 2.24 L or 105% predicted, FEV1/FVC ratio 76, %, %, and a DLCO 74% predicted with a positive bronchodilator response. Echo indicated normal LV function. She has never smoked. HRCT of chest was performed to rule out ILD especially in light of her history of cancer and chemotherapy. This indicated emphysematous lungs and some areas of basilar atelectasis but no evidence of ILD. She was started on Stiolto 2 puffs QD and MATEO. She feels her breathing has improved because she'll walk further before having to pace herself and sit down. She has a mild cough with rare clear phlegm and no wheezing. She notes chronic postnasal drip. She overall feels well but fatigued from the chemotherapy. She feels the inhalers are beneficial.           ROS: As per HPI and otherwise negative if not stated.    Past Medical History:   Diagnosis Date   • Arrhythmia    • Blood clotting disorder (CMS-HCC) 08/2015    bilat PE    • Blood transfusion 1957   • Cancer (CMS-HCC) 09/2012    rectal cancer,  Liver CA-2015   • CATARACT     removed b/l   • Chemotherapy-induced neutropenia (CMS-HCC) 9/28/2016   • Chickenpox    • Colon cancer (CMS-HCC)    • Dental disorder     dentures UPPERS AND LOWERS   • Elevated alkaline phosphatase level 11/15/2017   • Fall    • Guamanian measles    • Hemorrhagic disorder (CMS-HCC)     on Xarelto    • Hypercholesteremia    • Hypertension     no meds  currently    • Ileostomy in place (CMS-HCC)    • Indigestion    • Influenza    • Lightheadedness 9/17/2015   • Mumps    • Obstruction     ileostomy   • Other specified symptom associated with female genital organs     HYSTERECTOMY 1993   • Pneumonia 05/2017    tx'd with antibx   • Pulmonary embolism (CMS-HCC)    • 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)    • Renal insufficiency 3/14/2016   • Routine general medical examination at a health care facility 3/14/2016    3/14/16   • Seasonal allergies    • Tonsillitis        Past Surgical History:   Procedure Laterality Date   • COLONOSCOPY WITH BIOPSY  8/23/2015    Procedure: COLONOSCOPY WITH BIOPSY;  Surgeon: Wilbur Glasgow M.D.;  Location: ENDOSCOPY [facility];  Service:    • HEPATIC ABLATION LAPAROSCOPIC  7/6/2015    Procedure: HEPATIC ABLATION LAPAROSCOPIC.;  Surgeon: Kit West M.D.;  Location: SURGERY [facility];  Service:    • CATH PLACEMENT Left 7/6/2015    Procedure: CATH PLACEMENT CEPHALIC POWERPORT;  Surgeon: Kit West M.D.;  Location: SURGERY [facility];  Service:    • FISTULA IN ANO REPAIR  1/28/2015    Performed by Kolton Montgomery M.D. at SURGERY [facility]   • PERINEAL PROCEDURE  1/28/2015    Performed by Kolton Montgomery M.D. at [facility]   • FISTULA IN ANO REPAIR  10/15/2014    Performed by Kolton Montgomery M.D. at [facility]   • PERINEAL PROCEDURE  10/15/2014    Performed by Kolton Montgomery M.D. at SURGERY [facility]   • IRRIGATION & DEBRIDEMENT GENERAL  2/19/2014    Performed by Kolton Montgomery M.D. at [facility]   • FLAP GRAFT  2/19/2014    Performed by Kolton Montgomery M.D. at [facility]   • PERINEAL PROCEDURE  12/18/2013    Performed by Kolton Montgomery M.D. at [facility]   • MYOCUTANEOUS FLAP  12/18/2013    Performed by Kolton Montgomery M.D. at [facility]   • IRRIGATION & DEBRIDEMENT GENERAL  10/23/2013    Performed by Kolton DESAI  MARIANA Montgomery at SURGERY [facility] ORS   • FISTULA IN ANO REPAIR  9/4/2013    Performed by Kolton Montgomery M.D. at SURGERY [facility]   • FLAP ROTATION  9/4/2013    Performed by Kolton Montgomery M.D. at SURGERY [facility]   • RECOVERY  8/26/2013    Performed by [facility] at [facility] SAME DAY [facility]   • PROCTOSCOPY  7/17/2013    Performed by Kolton Montgomery M.D. at SURGERY [facility]   • BIOPSY GENERAL  7/17/2013    Performed by Kolton Montgomery M.D. at SURGERY [facility]   • SIGMOIDOSCOPY  2/27/2013    Performed by Kolton Montgomery M.D. at SURGERY [facility]   • FISTULA IN ANO REPAIR  2/27/2013    Performed by Kolton Montgomery M.D. at SURGERY [facility]   • LAPAROSCOPY  10/8/2012    Performed by Kolton Montgomery M.D. at SURGERY [facility]   • ILEO LOOP DIVERSION  10/8/2012    Performed by Kolton Montgomery M.D. at SURGERY [facility]   • COLECTOMY  9/26/2012    Performed by Kolton Montgomery M.D. at SURGERY [facility]   • COLONOSCOPY FLEX W/ENDO US  9/20/2012    Performed by Harshil Bolton M.D. at [facility]   • OTHER  2009    left eye torn retina repair   • CATARACT EXTRACTION WITH IOL  2004    bilateral   • ABDOMINAL HYSTERECTOMY TOTAL  1983   • CYST EXCISION  1972    ovarian   • COLON RESECTION     • EYE SURGERY      cataracts   • HYSTERECTOMY LAPAROSCOPY     • PB REMV 2ND CATARACT,CORN-SCLER SECTN     • TONSILLECTOMY         Family History   Problem Relation Age of Onset   • Heart Disease Mother    • Heart Failure Mother    • Hypertension Mother    • Hyperlipidemia Mother    • Cancer Mother    • Cancer Maternal Aunt 60     breast ca   • Lung Disease Brother      COPD/Emphysema/Smoker   • Cancer Brother      prostate cancer   • Alcohol/Drug Brother      Alcohol   • Kidney Disease Father      renal failure       Social History     Social History   • Marital status:      Spouse name: N/A   • Number of children: N/A   • Years of education: N/A     Occupational  "History   • Not on file.     Social History Main Topics   • Smoking status: Never Smoker   • Smokeless tobacco: Never Used   • Alcohol use No      Comment: rare   • Drug use: No   • Sexual activity: No     Other Topics Concern   • Not on file     Social History Narrative   • No narrative on file       Allergies as of 01/10/2018 - Reviewed 01/10/2018   Allergen Reaction Noted   • Codeine  04/05/2011   • Oxaliplatin Anaphylaxis 10/27/2015   • Pcn [penicillins] Itching 04/05/2011   • Sulfa drugs Itching 04/05/2011   • Tape Rash 09/04/2013        @Vital signs for this encounter:  Vitals:    01/10/18 1510   Height: 1.66 m (5' 5.35\")   Weight: 78.9 kg (174 lb)   Weight % change since last entry.: 0 %   BP: 122/89   Pulse: 89   BMI (Calculated): 28.65   Resp: 16   O2 sat % room air: 97 %       Current medications as of today   Current Outpatient Prescriptions   Medication Sig Dispense Refill   • Tiotropium Bromide-Olodaterol (STIOLTO RESPIMAT) 2.5-2.5 MCG/ACT Aero Soln Take 2 Puffs by mouth every day. 1 Inhaler 11   • albuterol (PROAIR HFA) 108 (90 Base) MCG/ACT Aero Soln inhalation aerosol Inhale 2 Puffs by mouth every four hours as needed for Shortness of Breath (wheezing). 1 Inhaler 11   • furosemide (LASIX) 40 MG Tab Take 1 Tab by mouth every day. 30 Tab 11   • ranitidine (ZANTAC) 150 MG Tab TAKE 1 TABLET 2 TIMES A DAY. 60 Tab 3   • XARELTO 20 MG Tab tablet TAKE 1 TABLET WITH DINNER. 30 Tab 6   • potassium chloride ER (KLOR-CON) 10 MEQ tablet Take 1 Tab by mouth every day. 30 Tab 3   • loperamide (IMODIUM) 1 MG/5ML Liquid Take 2 mg by mouth 4 times a day as needed for Diarrhea.     • ondansetron (ZOFRAN) 4 MG Tab tablet      • cyanocobalamin (VITAMIN B-12) 500 MCG Tab Take 500 mcg by mouth every day.     • Multiple Vitamins-Minerals (CENTRUM SILVER PO) Take  by mouth.     • Cholecalciferol (VITAMIN D3) 400 UNITS CAPS Take 1 Cap by mouth every day.     • loratadine (CLARITIN) 10 MG TABS Take 10 mg by mouth every day. " Indications: Hayfever     • lidocaine-prilocaine (EMLA) 2.5-2.5 % Cream APPLY A THICK FILM OVER THE PORT ACCESS SITE PRIOR TO ACCESS 30 g 2   • oxycodone immediate-release (ROXICODONE) 5 MG Tab Take 1 Tab by mouth every four hours as needed for Severe Pain. 30 Tab 0     Current Facility-Administered Medications   Medication Dose Route Frequency Provider Last Rate Last Dose   • heparin pf injection 500 Units  500 Units Intracatheter PRN Ella De A.P.N.   500 Units at 08/22/17 0856         Physical Exam:   Gen:           Alert and oriented, No apparent distress. Mood and affect appropriate, normal interaction with examiner.  Eyes:          PERRL, EOM intact, sclere white, conjunctive moist.  Ears:          Not examined.  Hearing:     Grossly intact.  Nose:          Normal, no lesions or deformities.  Dentition:    Good dentition.  Oropharynx:   Tongue normal, posterior pharynx without erythema or exudate.  Mallampati Classification: 2  Neck:        Supple, trachea midline, no masses.  Respiratory Effort: No intercostal retractions or use of accessory muscles.   Lung Auscultation:      Clear to auscultation bilaterally; no rales, rhonchi or wheezing.  CV:            Regular rate and rhythm. No murmurs, rubs or gallops.  Abd:           Not examined.   Lymphadenopathy: Not examined.  Gait and Station: Normal.  Digits and Nails: No clubbing, cyanosis, petechiae, or nodes.   Cranial Nerves: II-XII grossly intact.  Skin:        No rashes, lesions or ulcers noted.               Ext:           No cyanosis or edema.      Assessment:  1. Shortness of breath  AMB PULMONARY FUNCTION TEST/LAB   2. Pulmonary emphysema, unspecified emphysema type (CMS-HCC)     3. History of pulmonary embolism     4. Secondary malignant neoplasm of liver (CMS-HCC)         Immunizations:    Flu:not given, deferred to onc  Pneumovax 23:2006  Prevnar 13:not given    Plan:  1. Continue inhaler regimen. Patient finds improvement in dyspnea with  these.  2. Discussed respiratory hygiene.  3. Follow up in 1 year with PFT, sooner if needed.

## 2018-01-11 ENCOUNTER — OUTPATIENT INFUSION SERVICES (OUTPATIENT)
Dept: ONCOLOGY | Facility: MEDICAL CENTER | Age: 78
End: 2018-01-11
Attending: INTERNAL MEDICINE
Payer: MEDICARE

## 2018-01-11 VITALS
RESPIRATION RATE: 18 BRPM | WEIGHT: 173.72 LBS | BODY MASS INDEX: 28.94 KG/M2 | OXYGEN SATURATION: 100 % | HEART RATE: 81 BPM | SYSTOLIC BLOOD PRESSURE: 115 MMHG | TEMPERATURE: 97.2 F | HEIGHT: 65 IN | DIASTOLIC BLOOD PRESSURE: 58 MMHG

## 2018-01-11 DIAGNOSIS — R94.4 DECREASED GFR: ICD-10-CM

## 2018-01-11 DIAGNOSIS — C20 RECTAL CANCER (HCC): ICD-10-CM

## 2018-01-11 PROCEDURE — 96523 IRRIG DRUG DELIVERY DEVICE: CPT

## 2018-01-11 PROCEDURE — 700111 HCHG RX REV CODE 636 W/ 250 OVERRIDE (IP)

## 2018-01-11 RX ADMIN — HEPARIN 500 UNITS: 100 SYRINGE at 14:05

## 2018-01-11 ASSESSMENT — PAIN SCALES - GENERAL: PAINLEVEL: NO PAIN

## 2018-01-11 NOTE — TELEPHONE ENCOUNTER
I called the patient to see if Polly would closer for her, But she stated that that would be far for her she doesn't want to change provider. Thank you

## 2018-01-11 NOTE — PROGRESS NOTES
Patient presents home chemotherapy pump disconnect. Patient in good spirits today, voices no new concerns. Home infusion pump disconnected, states total of 73.5 mL given. Port flushed per protocol with brisk blood return and re-accessed, sterile gauze to site. Patient scheduled for next appointment and released in no acute distress with her .

## 2018-01-23 ENCOUNTER — OUTPATIENT INFUSION SERVICES (OUTPATIENT)
Dept: ONCOLOGY | Facility: MEDICAL CENTER | Age: 78
End: 2018-01-23
Attending: INTERNAL MEDICINE
Payer: MEDICARE

## 2018-01-23 ENCOUNTER — OFFICE VISIT (OUTPATIENT)
Dept: HEMATOLOGY ONCOLOGY | Facility: MEDICAL CENTER | Age: 78
End: 2018-01-23
Payer: MEDICARE

## 2018-01-23 ENCOUNTER — PATIENT OUTREACH (OUTPATIENT)
Dept: OTHER | Facility: MEDICAL CENTER | Age: 78
End: 2018-01-23

## 2018-01-23 VITALS
RESPIRATION RATE: 18 BRPM | DIASTOLIC BLOOD PRESSURE: 66 MMHG | HEIGHT: 65 IN | WEIGHT: 170.64 LBS | OXYGEN SATURATION: 95 % | SYSTOLIC BLOOD PRESSURE: 133 MMHG | BODY MASS INDEX: 28.43 KG/M2 | TEMPERATURE: 97.2 F | HEART RATE: 96 BPM

## 2018-01-23 VITALS
TEMPERATURE: 97.2 F | BODY MASS INDEX: 28.43 KG/M2 | HEIGHT: 65 IN | HEART RATE: 96 BPM | DIASTOLIC BLOOD PRESSURE: 66 MMHG | OXYGEN SATURATION: 95 % | SYSTOLIC BLOOD PRESSURE: 133 MMHG | RESPIRATION RATE: 18 BRPM | WEIGHT: 170.64 LBS

## 2018-01-23 VITALS
HEIGHT: 65 IN | DIASTOLIC BLOOD PRESSURE: 68 MMHG | OXYGEN SATURATION: 99 % | SYSTOLIC BLOOD PRESSURE: 112 MMHG | BODY MASS INDEX: 28.37 KG/M2 | HEART RATE: 122 BPM | WEIGHT: 170.31 LBS | TEMPERATURE: 96.8 F | RESPIRATION RATE: 16 BRPM

## 2018-01-23 DIAGNOSIS — R94.4 DECREASED GFR: ICD-10-CM

## 2018-01-23 DIAGNOSIS — C20 RECTAL CANCER (HCC): ICD-10-CM

## 2018-01-23 DIAGNOSIS — C78.7 SECONDARY MALIGNANT NEOPLASM OF LIVER (HCC): ICD-10-CM

## 2018-01-23 DIAGNOSIS — R25.2 LEG CRAMPS: ICD-10-CM

## 2018-01-23 DIAGNOSIS — Z51.11 ENCOUNTER FOR ANTINEOPLASTIC CHEMOTHERAPY: ICD-10-CM

## 2018-01-23 DIAGNOSIS — Z86.711 HISTORY OF PULMONARY EMBOLISM: ICD-10-CM

## 2018-01-23 LAB
ALBUMIN SERPL BCP-MCNC: 3.6 G/DL (ref 3.2–4.9)
ALBUMIN/GLOB SERPL: 1.4 G/DL
ALP SERPL-CCNC: 217 U/L (ref 30–99)
ALT SERPL-CCNC: 37 U/L (ref 2–50)
ANION GAP SERPL CALC-SCNC: 8 MMOL/L (ref 0–11.9)
AST SERPL-CCNC: 41 U/L (ref 12–45)
BASOPHILS # BLD AUTO: 1.7 % (ref 0–1.8)
BASOPHILS # BLD: 0.05 K/UL (ref 0–0.12)
BILIRUB SERPL-MCNC: 0.9 MG/DL (ref 0.1–1.5)
BUN SERPL-MCNC: 16 MG/DL (ref 8–22)
CALCIUM SERPL-MCNC: 9.6 MG/DL (ref 8.5–10.5)
CEA SERPL-MCNC: 1.5 NG/ML (ref 0–3)
CHLORIDE SERPL-SCNC: 107 MMOL/L (ref 96–112)
CO2 SERPL-SCNC: 22 MMOL/L (ref 20–33)
CREAT SERPL-MCNC: 1.06 MG/DL (ref 0.5–1.4)
EOSINOPHIL # BLD AUTO: 0.17 K/UL (ref 0–0.51)
EOSINOPHIL NFR BLD: 5.7 % (ref 0–6.9)
ERYTHROCYTE [DISTWIDTH] IN BLOOD BY AUTOMATED COUNT: 50.6 FL (ref 35.9–50)
GLOBULIN SER CALC-MCNC: 2.6 G/DL (ref 1.9–3.5)
GLUCOSE SERPL-MCNC: 98 MG/DL (ref 65–99)
HCT VFR BLD AUTO: 38.2 % (ref 37–47)
HGB BLD-MCNC: 12.4 G/DL (ref 12–16)
IMM GRANULOCYTES # BLD AUTO: 0.01 K/UL (ref 0–0.11)
IMM GRANULOCYTES NFR BLD AUTO: 0.3 % (ref 0–0.9)
LYMPHOCYTES # BLD AUTO: 0.69 K/UL (ref 1–4.8)
LYMPHOCYTES NFR BLD: 23.2 % (ref 22–41)
MCH RBC QN AUTO: 31 PG (ref 27–33)
MCHC RBC AUTO-ENTMCNC: 32.5 G/DL (ref 33.6–35)
MCV RBC AUTO: 95.5 FL (ref 81.4–97.8)
MONOCYTES # BLD AUTO: 0.57 K/UL (ref 0–0.85)
MONOCYTES NFR BLD AUTO: 19.2 % (ref 0–13.4)
NEUTROPHILS # BLD AUTO: 1.48 K/UL (ref 2–7.15)
NEUTROPHILS NFR BLD: 49.9 % (ref 44–72)
NRBC # BLD AUTO: 0 K/UL
NRBC BLD-RTO: 0 /100 WBC
PLATELET # BLD AUTO: 179 K/UL (ref 164–446)
PMV BLD AUTO: 10.3 FL (ref 9–12.9)
POTASSIUM SERPL-SCNC: 3.7 MMOL/L (ref 3.6–5.5)
PROT SERPL-MCNC: 6.2 G/DL (ref 6–8.2)
RBC # BLD AUTO: 4 M/UL (ref 4.2–5.4)
SODIUM SERPL-SCNC: 137 MMOL/L (ref 135–145)
WBC # BLD AUTO: 3 K/UL (ref 4.8–10.8)

## 2018-01-23 PROCEDURE — 82378 CARCINOEMBRYONIC ANTIGEN: CPT

## 2018-01-23 PROCEDURE — A4212 NON CORING NEEDLE OR STYLET: HCPCS

## 2018-01-23 PROCEDURE — 96374 THER/PROPH/DIAG INJ IV PUSH: CPT | Mod: XU

## 2018-01-23 PROCEDURE — 700111 HCHG RX REV CODE 636 W/ 250 OVERRIDE (IP): Performed by: NURSE PRACTITIONER

## 2018-01-23 PROCEDURE — 85025 COMPLETE CBC W/AUTO DIFF WBC: CPT

## 2018-01-23 PROCEDURE — 99214 OFFICE O/P EST MOD 30 MIN: CPT | Performed by: NURSE PRACTITIONER

## 2018-01-23 PROCEDURE — 36591 DRAW BLOOD OFF VENOUS DEVICE: CPT

## 2018-01-23 PROCEDURE — G0498 CHEMO EXTEND IV INFUS W/PUMP: HCPCS

## 2018-01-23 PROCEDURE — 80053 COMPREHEN METABOLIC PANEL: CPT

## 2018-01-23 RX ORDER — DEXTROSE MONOHYDRATE 50 MG/ML
INJECTION, SOLUTION INTRAVENOUS CONTINUOUS
Status: CANCELLED | OUTPATIENT
Start: 2018-01-23

## 2018-01-23 RX ORDER — PROCHLORPERAZINE MALEATE 10 MG
10 TABLET ORAL EVERY 6 HOURS PRN
Status: CANCELLED | OUTPATIENT
Start: 2018-01-23

## 2018-01-23 RX ORDER — ONDANSETRON 2 MG/ML
4 INJECTION INTRAMUSCULAR; INTRAVENOUS
Status: CANCELLED | OUTPATIENT
Start: 2018-01-23

## 2018-01-23 RX ORDER — ONDANSETRON 8 MG/1
8 TABLET, ORALLY DISINTEGRATING ORAL
Status: CANCELLED | OUTPATIENT
Start: 2018-01-23

## 2018-01-23 RX ORDER — LIDOCAINE HYDROCHLORIDE 10 MG/ML
10 INJECTION, SOLUTION INFILTRATION; PERINEURAL ONCE
Status: CANCELLED | OUTPATIENT
Start: 2018-01-23 | End: 2018-01-23

## 2018-01-23 RX ORDER — DEXAMETHASONE SODIUM PHOSPHATE 4 MG/ML
8 INJECTION, SOLUTION INTRA-ARTICULAR; INTRALESIONAL; INTRAMUSCULAR; INTRAVENOUS; SOFT TISSUE ONCE
Status: COMPLETED | OUTPATIENT
Start: 2018-01-23 | End: 2018-01-23

## 2018-01-23 RX ORDER — DEXAMETHASONE SODIUM PHOSPHATE 4 MG/ML
8 INJECTION, SOLUTION INTRA-ARTICULAR; INTRALESIONAL; INTRAMUSCULAR; INTRAVENOUS; SOFT TISSUE ONCE
Status: CANCELLED | OUTPATIENT
Start: 2018-01-23 | End: 2018-01-23

## 2018-01-23 RX ADMIN — FLUOROURACIL 3630 MG: 50 INJECTION, SOLUTION INTRAVENOUS at 11:52

## 2018-01-23 RX ADMIN — DEXAMETHASONE SODIUM PHOSPHATE 8 MG: 4 INJECTION, SOLUTION INTRAMUSCULAR; INTRAVENOUS at 10:16

## 2018-01-23 ASSESSMENT — ENCOUNTER SYMPTOMS
TINGLING: 0
SHORTNESS OF BREATH: 1
NAUSEA: 0
FEVER: 0
CHILLS: 0
COUGH: 0
CONSTIPATION: 0
WEIGHT LOSS: 0
VOMITING: 0
DIZZINESS: 0
HEADACHES: 0
DIARRHEA: 0
WHEEZING: 0
MYALGIAS: 0
PALPITATIONS: 0

## 2018-01-23 ASSESSMENT — PAIN SCALES - GENERAL
PAINLEVEL: NO PAIN

## 2018-01-23 NOTE — PROGRESS NOTES
"Pharmacy Chemotherapy Verification Note:    Patient Name: Karlene Walters      Dx: Colon CA        Protocol: 5-FU+Leucovorin       IV over 2 hours on Day 1, followed by   IVP on Day 1, followed by    - Dose reduced by 20% to 320 mg/m² starting with Cycle 18 ([place] #11) on 7/5/16 due to neutropenia    10/31/17: delete Leucovorin and 5-FU push d/t neutropenia per Dr. Hardy   Fluorouracil 1200 mg/m²  IV continuous infusion daily on Days 1 and 2 (2400 mg/m² IV over 46-48 hours) -    - Infusion Dose reduced by 20% starting with Cycle 18 ([place] #11) on 7/5/16 due to neutropenia   -20% reduction (1920 mg/m2) to be continued starting C28 per C Alsop APRN   14 day cycles for 12 cycles (adjuvant) or until disease progression or unacceptable toxicity     NCCN Guidelines for Colon Cancer. V.2.2015.  Jay T, et al. Eur J Cancer.1999;35(9):1343-7.      Allergies:  Codeine; Oxaliplatin; Pcn; Sulfa drugs; and Tape       /66   Pulse 96   Temp 36.2 °C (97.2 °F)   Resp 18   Ht 1.66 m (5' 5.35\")   Wt 77.4 kg (170 lb 10.2 oz)   SpO2 95%   BMI 28.09 kg/m²  Body surface area is 1.89 meters squared.    Labs 1/23/18  ANC ~1480 (OK to treat 1/23/18 per note in treatment plan)  Plt = 179k   Hgb = 12.4     SCr = 1.06 mg/dL CrCl ~54 mL/min        K+ = 3.7   AST/ALT/AP = 41/37/217 (no dose adj. recommended) TBili = 0.9      Drug Order   (Drug name, dose, route, IV Fluid & volume, frequency, number of doses) Cycle 53   Previous treatment: C52 = 1/9/18     Medication = Fluorouracil (5-FU)   Base Dose = 1920 mg/m²/dose  Calc Dose:Base Dose x 1.89 m² = 3628.8mg  Final Dose = 3630 mg   Route = CIVI  Conc = 50 mg/mL = 72.6 mL   Fluid & Volume = CADD pump 72.6 mL (+ 3 mL overfill = 75.6 mL)  Admin Duration = Over 46 hours  Rate = 1.6 mL/hour   Via CADD pump for home infusion        <5% difference, ok to treat with final dose     By my signature below, I confirm this process was performed independently with the BSA and all final " chemotherapy dosing calculations congruent. I have reviewed the above chemotherapy order and that my calculation of the final dose and BSA (when applicable) corroborate those calculations of the  pharmacist.     Davis Paz, MigueD

## 2018-01-23 NOTE — PROGRESS NOTES
Chemotherapy Verification - SECONDARY RN       Height = 65.35 in  Weight = 77.3 kg  BSA = 1.88 m2       Medication: 5 FU pump  Dose: 1920 mg/m2 over 46 hours  Calculated Dose: 3609.6 mg over 46 hours                             (In mg/m2, AUC, mg/kg)     I confirm that this process was performed independently.

## 2018-01-23 NOTE — PROGRESS NOTES
"Pharmacy Chemotherapy Verification Note:    Patient Name: Karlene Walters      Dx: Colon CA        Cycle 53 Previous treatment: C52 = 1/9/18    Protocol: 5-FU+Leucovorin        *Dosing Reference*   IV over 2 hours on Day 1, followed by   IVP on Day 1, followed by    - Dose reduced by 20% to 320 mg/m² starting with Cycle 18 ([place] #11) on 7/5/16 due to neutropenia    10/31/17: delete Leucovorin and 5-FU push d/t neutropenia per Dr. Hardy   Fluorouracil 1200 mg/m²  IV continuous infusion daily on Days 1 and 2 (2400 mg/m² IV over 46-48 hours) -    - Infusion Dose reduced by 20% starting with Cycle 18 ([place] #11) on 7/5/16 due to neutropenia   -20% reduction (1920 mg/m2) to be continued starting C28 per C Alsop APRN   14 day cycles for 12 cycles (adjuvant) or until disease progression or unacceptable toxicity     NCCN Guidelines for Colon Cancer. V.2.2015.  Jay T, et al. Eur J Cancer.1999;35(9):1343-7.      Allergies:  Codeine; Oxaliplatin; Pcn; Sulfa drugs; and Tape       /66   Pulse 96   Temp 36.2 °C (97.2 °F)   Resp 18   Ht 1.66 m (5' 5.35\")   Wt 77.4 kg (170 lb 10.2 oz)   SpO2 95%   BMI 28.09 kg/m²    Body surface area is 1.89 meters squared.      ANC~ 1480 Plt = 179k   Hgb = 12.4     SCr = 1.06mg/dL CrCl ~ 54mL/min   LFT's = WNL, except AP = 217  TBili = 0.9     MD aware of all current lab results. Orders received to proceed with treatment .         Fluorouracil (5-FU) 1920 mg/m²/dose x 1.89m² = 3628.8mg              <5% difference, OK to treat with final written dose = 3630 mg CIVI               via CADD pump at 1.6ml/hr x 46 hour       Nakia Arroyo, PharmD    "

## 2018-01-23 NOTE — PROGRESS NOTES
Paitient here for CADD home pump connect. Port previously accessed; brisk blood return noted. Labs drawn earlier today and reviewed today. Received verbal order to proceed with treatment today with ANC = 1480. Patient connected to 5FU CADD pump running at 1.6 ml/hr. Total volume with overfill = 73.6 ml. Minimum to infuse = 72.6 ml. Next appointment scheduled. Discharged to self care; no apparent distress noted.

## 2018-01-23 NOTE — PROGRESS NOTES
Patient presents ambulatory for pre chemo labs.  Patient reports feeling well and denies any s/s of infection at this time.  Port accessed using sterile technique, brisk blood return noted, lab collected per MD order, and flushed per protocol.  Patient left ambulatory in no distress.  Patient to return at 1030 for chemotherapy.

## 2018-01-23 NOTE — PROGRESS NOTES
Chemotherapy Verification - PRIMARY RN      Height = 166 cm  Weight = 77.3 kg  BSA = 1.89 m^2       Medication: fluorouracil  Dose: 1920 mg/m^2  Calculated Dose: 3628.8 mg                             (In mg/m2, AUC, mg/kg)     I confirm this process was performed independently with the BSA and all final chemotherapy dosing calculations congruent.  Any discrepancies of 5% or greater have been addressed with the chemotherapy pharmacist. The resolution of the discrepancy has been documented in the EPIC progress notes.

## 2018-01-23 NOTE — PROGRESS NOTES
On 1/23/2018 at 1050 this ONN met with patient in Infusion Services. Patient confirms she started to review materials provided by this ONN at last meeting. Patient denies questions. Patient states she has not read all of the information however patient states at this point her questions regarding treatment and diagnosis have been answered. Patient denies other questions or concerns at this time.

## 2018-01-23 NOTE — PROGRESS NOTES
"Subjective:      Karlene Walters is a 77 y.o. female who presents for Follow-Up (prechemo), for evaluation prior to cycle 45 of 5-FU for metastatic rectal cancer to the liver.      HPI   Mrs. Walters presents for evaluation prior to cycle 45 of single agent 5-FU for metastatic rectal cancer to liver. She is accompanied by her .    Dosing continues without oxaliplatin due to history of anaphylaxis and with 20% dose reduction initiated at cycle 21, 12/2016. Dosing was adjusted at cycle 39 in October 2017 to discontinue bolus 5-FU and leucovorin due to neutropenia and tolerance. She continues with single agent 5-FU and is tolerating treatment very well.    She reports ongoing issues with leg cramping despite low normal potassium. She can correlate increased cramping when using Lasix. She notes potassium supplements in her ostomy output so there is minimal absorption experienced. She states she does not tolerate liquid KCl very well. She continues on Xarelto for history of pulmonary emboli. She is otherwise asymptomatic in relation to her treatment.        Allergies   Allergen Reactions   • Codeine      \"gets drunk\"   • Oxaliplatin Anaphylaxis   • Pcn [Penicillins] Itching   • Sulfa Drugs Itching   • Tape Rash     PAPER TAPE OK       Current Outpatient Prescriptions on File Prior to Visit   Medication Sig Dispense Refill   • lidocaine-prilocaine (EMLA) 2.5-2.5 % Cream APPLY A THICK FILM OVER THE PORT ACCESS SITE PRIOR TO ACCESS 30 g 2   • albuterol (PROAIR HFA) 108 (90 Base) MCG/ACT Aero Soln inhalation aerosol Inhale 2 Puffs by mouth every four hours as needed for Shortness of Breath (wheezing). 1 Inhaler 11   • ranitidine (ZANTAC) 150 MG Tab TAKE 1 TABLET 2 TIMES A DAY. 60 Tab 3   • XARELTO 20 MG Tab tablet TAKE 1 TABLET WITH DINNER. 30 Tab 6   • cyanocobalamin (VITAMIN B-12) 500 MCG Tab Take 500 mcg by mouth every day.     • Multiple Vitamins-Minerals (CENTRUM SILVER PO) Take  by mouth.     • Cholecalciferol " "(VITAMIN D3) 400 UNITS CAPS Take 1 Cap by mouth every day.     • loratadine (CLARITIN) 10 MG TABS Take 10 mg by mouth every day. Indications: Hayfever     • oxycodone immediate-release (ROXICODONE) 5 MG Tab Take 1 Tab by mouth every four hours as needed for Severe Pain. 30 Tab 0   • Tiotropium Bromide-Olodaterol (STIOLTO RESPIMAT) 2.5-2.5 MCG/ACT Aero Soln Take 2 Puffs by mouth every day. 1 Inhaler 11   • furosemide (LASIX) 40 MG Tab Take 1 Tab by mouth every day. 30 Tab 11   • potassium chloride ER (KLOR-CON) 10 MEQ tablet Take 1 Tab by mouth every day. 30 Tab 3   • loperamide (IMODIUM) 1 MG/5ML Liquid Take 2 mg by mouth 4 times a day as needed for Diarrhea.     • ondansetron (ZOFRAN) 4 MG Tab tablet        Current Facility-Administered Medications on File Prior to Visit   Medication Dose Route Frequency Provider Last Rate Last Dose   • fluorouracil (ADRUCIL) 3,630 mg in CADD Cassette/Bag Chemo infusion  1,920 mg/m2 Intravenous Once Maria M Paz, A.P.R.N.   3,630 mg at 01/23/18 1152   • heparin pf injection 500 Units  500 Units Intracatheter PRN Ella De, A.P.N.   500 Units at 08/22/17 0856       Review of Systems   Constitutional: Negative for chills (\"cold all the time\"), fever, malaise/fatigue (Baseline now - \"takes longer to accomplish nothing\") and weight loss (stable (+2)).   Respiratory: Positive for shortness of breath (better, using inhaler 2 puffs once a day). Negative for cough and wheezing.    Cardiovascular: Negative for chest pain, palpitations and leg swelling.   Gastrointestinal: Negative for constipation, diarrhea (ileostomy - consistency changes with diet), nausea and vomiting.   Genitourinary: Negative for dysuria.   Musculoskeletal: Negative for myalgias.        Leg cramps, fairly often now, worse when using PRN Lasix   Neurological: Negative for dizziness, tingling and headaches.        Objective:     /68   Pulse (!) 122   Temp 36 °C (96.8 °F)   Resp 16   Ht 1.66 m (5' " "5.35\")   Wt 77.3 kg (170 lb 4.9 oz)   SpO2 99%   Breastfeeding? No   BMI 28.04 kg/m²      Physical Exam   Constitutional: She is oriented to person, place, and time. She appears well-developed and well-nourished. No distress.   HENT:   Head: Normocephalic and atraumatic.   Mouth/Throat: Oropharynx is clear and moist. No oropharyngeal exudate.   Eyes: Conjunctivae and EOM are normal. Pupils are equal, round, and reactive to light. Right eye exhibits no discharge. Left eye exhibits no discharge. No scleral icterus.   Neck: Normal range of motion. Neck supple. No thyromegaly present.   Cardiovascular: Regular rhythm, normal heart sounds and intact distal pulses.  Exam reveals no gallop and no friction rub.    No murmur heard.  Tachy   Pulmonary/Chest: Effort normal and breath sounds normal. No respiratory distress. She has no wheezes.   Abdominal: Soft. Bowel sounds are normal. She exhibits no distension. There is no tenderness.   RUQ ileostomy   Musculoskeletal: Normal range of motion. She exhibits no edema or tenderness.   Lymphadenopathy:        Head (right side): No submental, no submandibular, no tonsillar, no preauricular, no posterior auricular and no occipital adenopathy present.        Head (left side): No submental, no submandibular, no tonsillar, no preauricular, no posterior auricular and no occipital adenopathy present.     She has no cervical adenopathy.        Right cervical: No superficial cervical and no deep cervical adenopathy present.       Left cervical: No superficial cervical and no deep cervical adenopathy present.        Right: No supraclavicular adenopathy present.        Left: No supraclavicular adenopathy present.   Neurological: She is alert and oriented to person, place, and time.   Skin: Skin is warm and dry. No rash noted. She is not diaphoretic. No erythema. No pallor.   Psychiatric: She has a normal mood and affect. Her behavior is normal.   Anxious   Vitals " reviewed.      [facility]patient Infusion Services on 01/23/2018   Component Date Value Ref Range Status   • WBC 01/23/2018 3.0* 4.8 - 10.8 K/uL Final   • RBC 01/23/2018 4.00* 4.20 - 5.40 M/uL Final   • Hemoglobin 01/23/2018 12.4  12.0 - 16.0 g/dL Final   • Hematocrit 01/23/2018 38.2  37.0 - 47.0 % Final   • MCV 01/23/2018 95.5  81.4 - 97.8 fL Final   • MCH 01/23/2018 31.0  27.0 - 33.0 pg Final   • MCHC 01/23/2018 32.5* 33.6 - 35.0 g/dL Final   • RDW 01/23/2018 50.6* 35.9 - 50.0 fL Final   • Platelet Count 01/23/2018 179  164 - 446 K/uL Final   • MPV 01/23/2018 10.3  9.0 - 12.9 fL Final   • Neutrophils-Polys 01/23/2018 49.90  44.00 - 72.00 % Final   • Lymphocytes 01/23/2018 23.20  22.00 - 41.00 % Final   • Monocytes 01/23/2018 19.20* 0.00 - 13.40 % Final   • Eosinophils 01/23/2018 5.70  0.00 - 6.90 % Final   • Basophils 01/23/2018 1.70  0.00 - 1.80 % Final   • Immature Granulocytes 01/23/2018 0.30  0.00 - 0.90 % Final   • Nucleated RBC 01/23/2018 0.00  /100 WBC Final   • Neutrophils (Absolute) 01/23/2018 1.48* 2.00 - 7.15 K/uL Final   • Lymphs (Absolute) 01/23/2018 0.69* 1.00 - 4.80 K/uL Final   • Monos (Absolute) 01/23/2018 0.57  0.00 - 0.85 K/uL Final   • Eos (Absolute) 01/23/2018 0.17  0.00 - 0.51 K/uL Final   • Baso (Absolute) 01/23/2018 0.05  0.00 - 0.12 K/uL Final   • Immature Granulocytes (abs) 01/23/2018 0.01  0.00 - 0.11 K/uL Final   • NRBC (Absolute) 01/23/2018 0.00  K/uL Final   • Carcinoembryonic Antigen 01/23/2018 1.5  0.0 - 3.0 ng/mL Final    Comment: Effective September 17, 2013 the quantitative determination  of Carcinoembryonic Antigen (CEA) will now be performed at  [facility].  The Access CEA paramagnetic  particle chemiluminescent immunoassay is used.  Results  obtained with different test methods or kits cannot be used interchangeably.  Measurement of CEA has been shown to be  clinically relevant in the management of patients with  colorectal, breast, lung, prostatic, pancreatic, and  "ovarian  carcinomas.  Smokers may have slightly elevated levels of CEA.     • Sodium 01/23/2018 137  135 - 145 mmol/L Final   • Potassium 01/23/2018 3.7  3.6 - 5.5 mmol/L Final   • Chloride 01/23/2018 107  96 - 112 mmol/L Final   • Co2 01/23/2018 22  20 - 33 mmol/L Final   • Anion Gap 01/23/2018 8.0  0.0 - 11.9 Final   • Glucose 01/23/2018 98  65 - 99 mg/dL Final   • Bun 01/23/2018 16  8 - 22 mg/dL Final   • Creatinine 01/23/2018 1.06  0.50 - 1.40 mg/dL Final   • Calcium 01/23/2018 9.6  8.5 - 10.5 mg/dL Final   • AST(SGOT) 01/23/2018 41  12 - 45 U/L Final   • ALT(SGPT) 01/23/2018 37  2 - 50 U/L Final   • Alkaline Phosphatase 01/23/2018 217* 30 - 99 U/L Final   • Total Bilirubin 01/23/2018 0.9  0.1 - 1.5 mg/dL Final   • Albumin 01/23/2018 3.6  3.2 - 4.9 g/dL Final   • Total Protein 01/23/2018 6.2  6.0 - 8.2 g/dL Final   • Globulin 01/23/2018 2.6  1.9 - 3.5 g/dL Final   • A-G Ratio 01/23/2018 1.4  g/dL Final   • GFR If  01/23/2018 >60  >60 mL/min/1.73 m 2 Final   • GFR If Non  01/23/2018 50* >60 mL/min/1.73 m 2 Final        Assessment/Plan:     1. Rectal cancer (CMS-HCC)  DISCONTINUED: dexamethasone (DECADRON) injection 8 mg   2. Secondary malignant neoplasm of liver (CMS-HCC)     3. History of pulmonary embolism     4. Leg cramps     5. Encounter for antineoplastic chemotherapy         1.  History of pulmonary embolism: Acute PE ruled out on 8/24/2017, she continues on Xarelto    2.  Metastatic liver lesion: She is status post wide antibiotic radio embolization of liver lesion on 6/15/2017, without sequelae. She is next due for CT monitoring in approximately March 2018, sooner if monthly CEA increases.    3.  Leg cramps: CMP shows low-normal potassium. She notes increased leg cramps with use of Lasix and notes potassium supplements in her ileostomy output. She is encouraged to try sport electolyte supplements such as \"Blocks\" or \"Sport Beans\" to see if she absorbs those better. " Otherwise she'll continue with good hydration, diet, continue supplementation.    4. Rectal cancer: She continues with single agent 5-FU: 20% reduction was initiated on cycle 21, bolus 5-FU/LV discontinued at cycle 39. CBC and CMP has been evaluated and found to be within acceptable limits. ANC is 1.48, she is afebrile and asymptomatic and so will proceed with treatment. CEA stable at 1.5. She will proceed with cycle 45 single agent 5-FU today and return in 2 weeks for evaluation prior to cycle 46, sooner as needed.    She'll be traveling following cycle 46 and desires to have flu and pneumonia vaccination. She will obtain those vaccinations prior to cycle 46 when her counts are at their best.        The patient verbalized agreement and understanding of current plan. All questions and concerns were addressed at time of visit.    Please note that this dictation was created using voice recognition software. I have made every reasonable attempt to correct obvious errors, but I expect that there are errors of grammar and possibly content that I did not discover before finalizing the note.

## 2018-01-25 ENCOUNTER — OUTPATIENT INFUSION SERVICES (OUTPATIENT)
Dept: ONCOLOGY | Facility: MEDICAL CENTER | Age: 78
End: 2018-01-25
Attending: INTERNAL MEDICINE
Payer: MEDICARE

## 2018-01-25 VITALS
HEART RATE: 92 BPM | TEMPERATURE: 97.5 F | HEIGHT: 65 IN | OXYGEN SATURATION: 100 % | BODY MASS INDEX: 28.61 KG/M2 | RESPIRATION RATE: 18 BRPM | WEIGHT: 171.74 LBS | SYSTOLIC BLOOD PRESSURE: 116 MMHG | DIASTOLIC BLOOD PRESSURE: 71 MMHG

## 2018-01-25 DIAGNOSIS — R94.4 DECREASED GFR: ICD-10-CM

## 2018-01-25 DIAGNOSIS — C20 RECTAL CANCER (HCC): ICD-10-CM

## 2018-01-25 PROCEDURE — 96523 IRRIG DRUG DELIVERY DEVICE: CPT

## 2018-01-25 PROCEDURE — 700111 HCHG RX REV CODE 636 W/ 250 OVERRIDE (IP): Performed by: NURSE PRACTITIONER

## 2018-01-25 RX ADMIN — HEPARIN 500 UNITS: 100 SYRINGE at 14:39

## 2018-01-25 ASSESSMENT — PAIN SCALES - GENERAL: PAINLEVEL: NO PAIN

## 2018-01-25 NOTE — PROGRESS NOTES
Patient arrived for CADD pump de-access. Pt reports no major incidents during infusion. Pump verified 0ml remaining.  Disconnected CADD pump, cleaned and placed personal pump back with pharmacy.  Port flushed per protocol, with blood return noted.  Port de-accessed and gauze dressing applied. Confirmed next appt in two weeks. Pt discharged home in great spirits under no apparent distress.

## 2018-02-01 RX ORDER — ONDANSETRON 2 MG/ML
4 INJECTION INTRAMUSCULAR; INTRAVENOUS
Status: CANCELLED | OUTPATIENT
Start: 2018-02-06

## 2018-02-01 RX ORDER — ONDANSETRON 8 MG/1
8 TABLET, ORALLY DISINTEGRATING ORAL
Status: CANCELLED | OUTPATIENT
Start: 2018-02-06

## 2018-02-01 RX ORDER — PROCHLORPERAZINE MALEATE 10 MG
10 TABLET ORAL EVERY 6 HOURS PRN
Status: CANCELLED | OUTPATIENT
Start: 2018-02-06

## 2018-02-01 RX ORDER — LIDOCAINE HYDROCHLORIDE 10 MG/ML
10 INJECTION, SOLUTION INFILTRATION; PERINEURAL ONCE
Status: CANCELLED | OUTPATIENT
Start: 2018-02-06 | End: 2018-02-06

## 2018-02-01 RX ORDER — DEXTROSE MONOHYDRATE 50 MG/ML
INJECTION, SOLUTION INTRAVENOUS CONTINUOUS
Status: CANCELLED | OUTPATIENT
Start: 2018-02-06

## 2018-02-01 RX ORDER — DEXAMETHASONE SODIUM PHOSPHATE 4 MG/ML
8 INJECTION, SOLUTION INTRA-ARTICULAR; INTRALESIONAL; INTRAMUSCULAR; INTRAVENOUS; SOFT TISSUE ONCE
Status: CANCELLED | OUTPATIENT
Start: 2018-02-06 | End: 2018-02-06

## 2018-02-05 NOTE — PROGRESS NOTES
"Pharmacy Chemotherapy Verification Note:    Patient Name: Karlene Walters      Dx: Colon CA        Protocol: 5-FU+Leucovorin       IV over 2 hours on Day 1, followed by   IVP on Day 1, followed by    - Dose reduced by 20% to 320 mg/m² starting with Cycle 18 ([place] #11) on 7/5/16 due to neutropenia    10/31/17: delete Leucovorin and 5-FU push d/t neutropenia per Dr. Hardy   Fluorouracil 1200 mg/m²  IV continuous infusion daily on Days 1 and 2 (2400 mg/m² IV over 46-48 hours) -    - Infusion Dose reduced by 20% starting with Cycle 18 ([place] #11) on 7/5/16 due to neutropenia   -20% reduction (1920 mg/m2) to be continued starting C28 per C Alsop APRN   14 day cycles for 12 cycles (adjuvant) or until disease progression or unacceptable toxicity     NCCN Guidelines for Colon Cancer. V.2.2015.  Jay T, et al. Eur J Cancer.1999;35(9):1343-7.      Allergies:  Codeine; Oxaliplatin; Pcn; Sulfa drugs; and Tape       /71   Pulse 100   Temp 36.4 °C (97.6 °F)   Resp 18   Ht 1.66 m (5' 5.35\")   Wt 77.6 kg (171 lb 1.2 oz)   SpO2 100%   BMI 28.16 kg/m²  Body surface area is 1.89 meters squared.    Labs 2/6/18  ANC ~1370 (OK >1000)  Plt = 192k   Hgb = 11.7  SCr = 1.05 mg/dL CrCl ~55 mL/min        K+ = 4.0  AST/ALT/AP = 41/39/203 (no dose adj. recommended) TBili = 0.7    Drug Order   (Drug name, dose, route, IV Fluid & volume, frequency, number of doses) Cycle 54   Previous treatment: C53 = 1/23/18     Medication = Fluorouracil (5-FU)   Base Dose = 1920 mg/m²/dose  Calc Dose:Base Dose x 1.89 m² = 3629 mg  Final Dose = 3630 mg   Route = CIVI  Conc = 50 mg/mL = 72.6 mL   Fluid & Volume = CADD pump 72.6 mL (+ 3 mL overfill = 75.6 mL)  Admin Duration = Over 46 hours  Rate = 1.6 mL/hour   Via CADD pump for home infusion        <5% difference, ok to treat with final dose     By my signature below, I confirm this process was performed independently with the BSA and all final chemotherapy dosing calculations congruent. I " have reviewed the above chemotherapy order and that my calculation of the final dose and BSA (when applicable) corroborate those calculations of the  pharmacist.     Norris Mitchell, PharmD

## 2018-02-06 ENCOUNTER — DOCUMENTATION (OUTPATIENT)
Dept: HEMATOLOGY ONCOLOGY | Facility: MEDICAL CENTER | Age: 78
End: 2018-02-06

## 2018-02-06 ENCOUNTER — OFFICE VISIT (OUTPATIENT)
Dept: HEMATOLOGY ONCOLOGY | Facility: MEDICAL CENTER | Age: 78
End: 2018-02-06
Payer: MEDICARE

## 2018-02-06 ENCOUNTER — OUTPATIENT INFUSION SERVICES (OUTPATIENT)
Dept: ONCOLOGY | Facility: MEDICAL CENTER | Age: 78
End: 2018-02-06
Attending: INTERNAL MEDICINE
Payer: MEDICARE

## 2018-02-06 VITALS
HEIGHT: 65 IN | OXYGEN SATURATION: 100 % | HEART RATE: 100 BPM | WEIGHT: 171.08 LBS | SYSTOLIC BLOOD PRESSURE: 133 MMHG | RESPIRATION RATE: 18 BRPM | TEMPERATURE: 97.6 F | BODY MASS INDEX: 28.5 KG/M2 | DIASTOLIC BLOOD PRESSURE: 71 MMHG

## 2018-02-06 VITALS
HEIGHT: 65 IN | RESPIRATION RATE: 18 BRPM | SYSTOLIC BLOOD PRESSURE: 133 MMHG | OXYGEN SATURATION: 100 % | HEART RATE: 100 BPM | BODY MASS INDEX: 28.5 KG/M2 | TEMPERATURE: 97.6 F | WEIGHT: 171.08 LBS | DIASTOLIC BLOOD PRESSURE: 71 MMHG

## 2018-02-06 VITALS
WEIGHT: 171 LBS | TEMPERATURE: 97.5 F | HEART RATE: 100 BPM | SYSTOLIC BLOOD PRESSURE: 133 MMHG | OXYGEN SATURATION: 100 % | RESPIRATION RATE: 18 BRPM | HEIGHT: 65 IN | DIASTOLIC BLOOD PRESSURE: 71 MMHG | BODY MASS INDEX: 28.49 KG/M2

## 2018-02-06 DIAGNOSIS — C20 RECTAL CANCER (HCC): ICD-10-CM

## 2018-02-06 DIAGNOSIS — R94.4 DECREASED GFR: ICD-10-CM

## 2018-02-06 LAB
ALBUMIN SERPL BCP-MCNC: 3.6 G/DL (ref 3.2–4.9)
ALBUMIN/GLOB SERPL: 1.6 G/DL
ALP SERPL-CCNC: 203 U/L (ref 30–99)
ALT SERPL-CCNC: 39 U/L (ref 2–50)
ANION GAP SERPL CALC-SCNC: 5 MMOL/L (ref 0–11.9)
AST SERPL-CCNC: 41 U/L (ref 12–45)
BASOPHILS # BLD AUTO: 1.8 % (ref 0–1.8)
BASOPHILS # BLD: 0.06 K/UL (ref 0–0.12)
BILIRUB SERPL-MCNC: 0.7 MG/DL (ref 0.1–1.5)
BUN SERPL-MCNC: 16 MG/DL (ref 8–22)
CALCIUM SERPL-MCNC: 8.9 MG/DL (ref 8.5–10.5)
CEA SERPL-MCNC: 1.2 NG/ML (ref 0–3)
CHLORIDE SERPL-SCNC: 108 MMOL/L (ref 96–112)
CO2 SERPL-SCNC: 25 MMOL/L (ref 20–33)
CREAT SERPL-MCNC: 1.05 MG/DL (ref 0.5–1.4)
EOSINOPHIL # BLD AUTO: 0.17 K/UL (ref 0–0.51)
EOSINOPHIL NFR BLD: 5.1 % (ref 0–6.9)
ERYTHROCYTE [DISTWIDTH] IN BLOOD BY AUTOMATED COUNT: 53 FL (ref 35.9–50)
GLOBULIN SER CALC-MCNC: 2.3 G/DL (ref 1.9–3.5)
GLUCOSE SERPL-MCNC: 93 MG/DL (ref 65–99)
HCT VFR BLD AUTO: 37.8 % (ref 37–47)
HGB BLD-MCNC: 11.7 G/DL (ref 12–16)
IMM GRANULOCYTES # BLD AUTO: 0.01 K/UL (ref 0–0.11)
IMM GRANULOCYTES NFR BLD AUTO: 0.3 % (ref 0–0.9)
LYMPHOCYTES # BLD AUTO: 1.01 K/UL (ref 1–4.8)
LYMPHOCYTES NFR BLD: 30.3 % (ref 22–41)
MCH RBC QN AUTO: 29.3 PG (ref 27–33)
MCHC RBC AUTO-ENTMCNC: 31 G/DL (ref 33.6–35)
MCV RBC AUTO: 94.5 FL (ref 81.4–97.8)
MONOCYTES # BLD AUTO: 0.71 K/UL (ref 0–0.85)
MONOCYTES NFR BLD AUTO: 21.3 % (ref 0–13.4)
NEUTROPHILS # BLD AUTO: 1.37 K/UL (ref 2–7.15)
NEUTROPHILS NFR BLD: 41.2 % (ref 44–72)
NRBC # BLD AUTO: 0 K/UL
NRBC BLD-RTO: 0 /100 WBC
PLATELET # BLD AUTO: 192 K/UL (ref 164–446)
PMV BLD AUTO: 10.5 FL (ref 9–12.9)
POTASSIUM SERPL-SCNC: 4 MMOL/L (ref 3.6–5.5)
PROT SERPL-MCNC: 5.9 G/DL (ref 6–8.2)
RBC # BLD AUTO: 4 M/UL (ref 4.2–5.4)
SODIUM SERPL-SCNC: 138 MMOL/L (ref 135–145)
WBC # BLD AUTO: 3.3 K/UL (ref 4.8–10.8)

## 2018-02-06 PROCEDURE — 80053 COMPREHEN METABOLIC PANEL: CPT

## 2018-02-06 PROCEDURE — 700111 HCHG RX REV CODE 636 W/ 250 OVERRIDE (IP): Performed by: NURSE PRACTITIONER

## 2018-02-06 PROCEDURE — 96375 TX/PRO/DX INJ NEW DRUG ADDON: CPT

## 2018-02-06 PROCEDURE — G0498 CHEMO EXTEND IV INFUS W/PUMP: HCPCS

## 2018-02-06 PROCEDURE — 82378 CARCINOEMBRYONIC ANTIGEN: CPT

## 2018-02-06 PROCEDURE — A4212 NON CORING NEEDLE OR STYLET: HCPCS

## 2018-02-06 PROCEDURE — 96374 THER/PROPH/DIAG INJ IV PUSH: CPT | Mod: XU

## 2018-02-06 PROCEDURE — 306780 HCHG STAT FOR TRANSFUSION PER CASE

## 2018-02-06 PROCEDURE — 85025 COMPLETE CBC W/AUTO DIFF WBC: CPT

## 2018-02-06 PROCEDURE — 700111 HCHG RX REV CODE 636 W/ 250 OVERRIDE (IP)

## 2018-02-06 PROCEDURE — 99214 OFFICE O/P EST MOD 30 MIN: CPT | Performed by: INTERNAL MEDICINE

## 2018-02-06 RX ORDER — ONDANSETRON 2 MG/ML
4 INJECTION INTRAMUSCULAR; INTRAVENOUS
Status: CANCELLED | OUTPATIENT
Start: 2018-03-20

## 2018-02-06 RX ORDER — ONDANSETRON 8 MG/1
8 TABLET, ORALLY DISINTEGRATING ORAL
Status: CANCELLED | OUTPATIENT
Start: 2018-03-20

## 2018-02-06 RX ORDER — LIDOCAINE HYDROCHLORIDE 10 MG/ML
10 INJECTION, SOLUTION INFILTRATION; PERINEURAL ONCE
Status: CANCELLED | OUTPATIENT
Start: 2018-03-20 | End: 2018-02-20

## 2018-02-06 RX ORDER — DEXTROSE MONOHYDRATE 50 MG/ML
INJECTION, SOLUTION INTRAVENOUS CONTINUOUS
Status: CANCELLED | OUTPATIENT
Start: 2018-03-20

## 2018-02-06 RX ORDER — PROCHLORPERAZINE MALEATE 10 MG
10 TABLET ORAL EVERY 6 HOURS PRN
Status: CANCELLED | OUTPATIENT
Start: 2018-03-20

## 2018-02-06 RX ORDER — DEXAMETHASONE SODIUM PHOSPHATE 4 MG/ML
8 INJECTION, SOLUTION INTRA-ARTICULAR; INTRALESIONAL; INTRAMUSCULAR; INTRAVENOUS; SOFT TISSUE ONCE
Status: COMPLETED | OUTPATIENT
Start: 2018-02-06 | End: 2018-02-06

## 2018-02-06 RX ORDER — DEXAMETHASONE SODIUM PHOSPHATE 4 MG/ML
8 INJECTION, SOLUTION INTRA-ARTICULAR; INTRALESIONAL; INTRAMUSCULAR; INTRAVENOUS; SOFT TISSUE ONCE
Status: CANCELLED | OUTPATIENT
Start: 2018-03-20 | End: 2018-02-20

## 2018-02-06 RX ADMIN — DEXAMETHASONE SODIUM PHOSPHATE 8 MG: 4 INJECTION, SOLUTION INTRAMUSCULAR; INTRAVENOUS at 10:30

## 2018-02-06 RX ADMIN — FLUOROURACIL 3630 MG: 50 INJECTION, SOLUTION INTRAVENOUS at 11:05

## 2018-02-06 RX ADMIN — HEPARIN 500 UNITS: 100 SYRINGE at 08:23

## 2018-02-06 ASSESSMENT — PAIN SCALES - GENERAL
PAINLEVEL: NO PAIN

## 2018-02-06 NOTE — PROGRESS NOTES
Chemotherapy Verification - PRIMARY RN      Height = 166cm  Weight = 77.6kg  BSA = 1.89       Medication: 5FU  Dose: 1920mg/m2  Calculated Dose: 3631mg via CADD pump over 46hr.      I confirm this process was performed independently with the BSA and all final chemotherapy dosing calculations congruent.  Any discrepancies of 5% or greater have been addressed with the chemotherapy pharmacist. The resolution of the discrepancy has been documented in the EPIC progress notes.

## 2018-02-06 NOTE — PROGRESS NOTES
Chemotherapy Verification - SECONDARY RN       Height = 166 cm  Weight = 77.6 kg  BSA = 1.89 m2       Medication: 5FU  Dose: 1920 mg/m2  Calculated Dose: 3631 mg                             (In mg/m2, AUC, mg/kg)     I confirm that this process was performed independently.

## 2018-02-06 NOTE — PROGRESS NOTES
"Pharmacy Chemotherapy Verification Note:    Patient Name: Karlene Walters      Dx: Colon CA        Cycle 54   Previous treatment: C53 = 1/23/18    Protocol: 5-FU+Leucovorin        *Dosing Reference*   IV over 2 hours on Day 1, followed by   IVP on Day 1, followed by    - Dose reduced by 20% to 320 mg/m² starting with Cycle 18 ([place] #11) on 7/5/16 due to neutropenia    10/31/17: delete Leucovorin and 5-FU push d/t neutropenia per Dr. Hardy   Fluorouracil 1200 mg/m²  IV continuous infusion daily on Days 1 and 2 (2400 mg/m² IV over 46-48 hours) -    - Infusion Dose reduced by 20% starting with Cycle 18 ([place] #11) on 7/5/16 due to neutropenia   -20% reduction (1920 mg/m2) to be continued starting C28 per C Alsop APRN   14 day cycles for 12 cycles (adjuvant) or until disease progression or unacceptable toxicity     NCCN Guidelines for Colon Cancer. V.2.2015.  Jay T, et al. Eur J Cancer.1999;35(9):1343-7.      Allergies:  Codeine; Oxaliplatin; Pcn; Sulfa drugs; and Tape       /71   Pulse 100   Temp 36.4 °C (97.6 °F)   Resp 18   Ht 1.66 m (5' 5.35\")   Wt 77.6 kg (171 lb 1.2 oz)   SpO2 100%   BMI 28.16 kg/m²    Body surface area is 1.89 meters squared.      ANC~ 1370(ok if > 1000) Plt = 192k   Hgb = 11.7     SCr = 1.05mg/dL CrCl ~ 55mL/min   LFT's = WNL, except Ap = 203 TBili = 0.7       MD aware of all current lab results. Orders received to proceed with treatment.       Fluorouracil (5-FU) 1920 mg/m² x 1.89m² = 3628.8mg              <5% difference, OK to treat with final written dose = 3630 mg CIVI               via CADD pump at 1.6ml/hr x 46 hour       Nakia Arroyo, PharmD    "

## 2018-02-06 NOTE — PROGRESS NOTES
Pt to infusion services ambulatory per self and accompanied by spouse.  Pt here for scheduled port access and lab work prior to f/u appointment with MD and chemotherapy later this morning.  Plan of care reviewed.  Pt verbalizes understanding.  Pt with left chest port with EMLA cream in place.  Port site cleaned and port accessed in sterile fashion.  Port flushes easily; + brisk blood return verified.  Labs drawn per MD orders.  Port flushed with normal saline and heparin.  Port remains accessed for chemotherapy scheduled for later this morning.  Pt released to self care and care of spouse in no apparent distress after completion of visit.  Pt to see MD at this time.  Pt to return following appointment with MD.  Appointment scheduled.  Blood samples sent to lab.

## 2018-02-06 NOTE — PROGRESS NOTES
Nutrition Services: Brief Update  Met w/ pt this date to follow-up on current intake and appetite. Pt reports good PO w/ no current c/o GI distress, taste changes, nor difficulty swallowing. Pt also has successfully maintained her body weight at this time. Otherwise, encouraged pt to continue current intake for wt maintenance and preservation of lean body mass. RD to continue to monitor PO adequacy and wt status to leave further recommendations accordingly.

## 2018-02-06 NOTE — PROGRESS NOTES
Date of visit: 2/6/2018  9:12 AM      Metastatic Invasive moderately differenated rectal cancer to liver, for follow-up.        History of presenting illness: Karlene Walters  is a 77 y.o. year old female who is here for follow-up of metastatic rectal carcinoma.     -Originally diagnosed with a rectal carcinoma in 2012 secondary to bleeding. s/pt laparoscopic resection with primary anastomosis and final pathology was consistent with well-differentiated adenocarcinoma with 0/30 nodes. She was staged pT2 N0. 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      8/2015 she was diagnosed with metastatic disease to the liver with a 2.4 cm mass as well as small pulmonary nodules and thyroid nodules. She underwent an ablative therapy to the liver. She was then started on Xelox. She had  allergic reaction to oxaliplatin and was continued on Xeloda. This eventually led to increased diarrhea and her regimen was changed to single agent 5-FU. She has been tolerating the therapy well with good responses on imaging.      3/2017- CT of the chest abdomen pelvis which has been stable and no evidence of disease. CT of the wrist was consistent with tenosynovitis and degenerative changes.     5/17-established care with me   EGD and colonoscopy  did not reveal any local recurrence. She had some gastritis.   - CEA has trended up to 13  -PET scan isolated hepatic lesion. Patchy basilar pulmonary opacities. Seen by Dr. West referred for radial embolization  6/17- Y90 Theraspheres.  Continued on 5-FU.  -CEA trending down/normalized     She is here for follow-up. Her CEA level has trended down to normal range.. She is doing very well, planning to go for a cruise for 2 weeks.. She has occasional worsening from her ostomy for which she is following up with surgery      Past Medical History:      Past Medical History:    Diagnosis Date   • Arrhythmia    • Blood clotting disorder (CMS-HCC) 08/2015    bilat PE    • Blood transfusion 1957   • Cancer (CMS-HCC) 09/2012    rectal cancer,  Liver CA-2015   • CATARACT     removed b/l   • Chemotherapy-induced neutropenia (CMS-HCC) 9/28/2016   • Chickenpox    • Colon cancer (CMS-HCC)    • Dental disorder     dentures UPPERS AND LOWERS   • Elevated alkaline phosphatase level 11/15/2017   • Fall    • [place] measles    • Hemorrhagic disorder (CMS-HCC)     on Xarelto    • Hypercholesteremia    • Hypertension     no meds currently    • Ileostomy in place (CMS-HCC)    • Indigestion    • Influenza    • Lightheadedness 9/17/2015   • Mumps    • Obstruction     ileostomy   • Other specified symptom associated with female genital organs     HYSTERECTOMY 1993   • Pneumonia 05/2017    tx'd with antibx   • Pulmonary embolism (CMS-HCC)    • 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)    • Renal insufficiency 3/14/2016   • Routine general medical examination at a health care facility 3/14/2016    3/14/16   • Seasonal allergies    • Tonsillitis        Past surgical history:       Past Surgical History:   Procedure Laterality Date   • COLONOSCOPY WITH BIOPSY  8/23/2015    Procedure: COLONOSCOPY WITH BIOPSY;  Surgeon: Wilbur Glasgow M.D.;  Location: ENDOSCOPY [facility];  Service:    • HEPATIC ABLATION LAPAROSCOPIC  7/6/2015    Procedure: HEPATIC ABLATION LAPAROSCOPIC.;  Surgeon: Kit West M.D.;  Location: SURGERY [facility];  Service:    • CATH PLACEMENT Left 7/6/2015    Procedure: CATH PLACEMENT CEPHALIC POWERPORT;  Surgeon: Kit eWst M.D.;  Location: SURGERY [facility];  Service:    • FISTULA IN ANO REPAIR  1/28/2015    Performed by Kolton Montgomery M.D. at SURGERY [facility]   • PERINEAL PROCEDURE  1/28/2015    Performed by Kolton Montgomery M.D. at SURGERY [facility]   • FISTULA IN ANO REPAIR  10/15/2014    Performed by Kolton Montgomery M.D. at SURGERY  [facility]   • PERINEAL PROCEDURE  10/15/2014    Performed by Kolton Montgomery M.D. at SURGERY [facility]   • IRRIGATION & DEBRIDEMENT GENERAL  2/19/2014    Performed by Kolton Montgomery M.D. at SURGERY [facility]   • FLAP GRAFT  2/19/2014    Performed by Kolton Montgomery M.D. at SURGERY [facility]   • PERINEAL PROCEDURE  12/18/2013    Performed by Kolton Montgomery M.D. at SURGERY [facility]   • MYOCUTANEOUS FLAP  12/18/2013    Performed by Kolton Montgomery M.D. at SURGERY [facility]   • IRRIGATION & DEBRIDEMENT GENERAL  10/23/2013    Performed by Kolton Montgomery M.D. at [facility]   • FISTULA IN ANO REPAIR  9/4/2013    Performed by Kolton Montgomery M.D. at [facility]   • FLAP ROTATION  9/4/2013    Performed by Kolton Montgomery M.D. at SURGERY [facility]   • RECOVERY  8/26/2013    Performed by Cath-Recovery Surgery at [facility] SAME DAY [facility]   • PROCTOSCOPY  7/17/2013    Performed by Kolton Montgomery M.D. at SURGERY [facility]   • BIOPSY GENERAL  7/17/2013    Performed by Kolton Montgomery M.D. at [facility]   • SIGMOIDOSCOPY  2/27/2013    Performed by Kolton Montgomery M.D. at [facility]   • FISTULA IN ANO REPAIR  2/27/2013    Performed by Kolton Montgomery M.D. at SURGERY [facility]   • LAPAROSCOPY  10/8/2012    Performed by Kolton Montgomery M.D. at SURGERY [facility]   • ILEO LOOP DIVERSION  10/8/2012    Performed by Kolton Montgomery M.D. at SURGERY [facility]   • COLECTOMY  9/26/2012    Performed by Kolton Montgomery M.D. at [facility]   • COLONOSCOPY FLEX W/ENDO US  9/20/2012    Performed by Harshil Bolton M.D. at [facility]   • OTHER  2009    left eye torn retina repair   • CATARACT EXTRACTION WITH IOL  2004    bilateral   • ABDOMINAL HYSTERECTOMY TOTAL  1983   • CYST EXCISION  1972    ovarian   • COLON RESECTION     • EYE SURGERY      cataracts   • HYSTERECTOMY LAPAROSCOPY     • PB REMV 2ND CATARACT,CORN-SCLER SECTN      • TONSILLECTOMY         Allergies:       Codeine; Oxaliplatin; Pcn [penicillins]; Sulfa drugs; and Tape    Medications:         Current Outpatient Prescriptions   Medication Sig Dispense Refill   • lidocaine-prilocaine (EMLA) 2.5-2.5 % Cream APPLY A THICK FILM OVER THE PORT ACCESS SITE PRIOR TO ACCESS 30 g 2   • oxycodone immediate-release (ROXICODONE) 5 MG Tab Take 1 Tab by mouth every four hours as needed for Severe Pain. 30 Tab 0   • Tiotropium Bromide-Olodaterol (STIOLTO RESPIMAT) 2.5-2.5 MCG/ACT Aero Soln Take 2 Puffs by mouth every day. 1 Inhaler 11   • albuterol (PROAIR HFA) 108 (90 Base) MCG/ACT Aero Soln inhalation aerosol Inhale 2 Puffs by mouth every four hours as needed for Shortness of Breath (wheezing). 1 Inhaler 11   • furosemide (LASIX) 40 MG Tab Take 1 Tab by mouth every day. 30 Tab 11   • ranitidine (ZANTAC) 150 MG Tab TAKE 1 TABLET 2 TIMES A DAY. 60 Tab 3   • XARELTO 20 MG Tab tablet TAKE 1 TABLET WITH DINNER. 30 Tab 6   • potassium chloride ER (KLOR-CON) 10 MEQ tablet Take 1 Tab by mouth every day. 30 Tab 3   • loperamide (IMODIUM) 1 MG/5ML Liquid Take 2 mg by mouth 4 times a day as needed for Diarrhea.     • ondansetron (ZOFRAN) 4 MG Tab tablet      • cyanocobalamin (VITAMIN B-12) 500 MCG Tab Take 500 mcg by mouth every day.     • Multiple Vitamins-Minerals (CENTRUM SILVER PO) Take  by mouth.     • Cholecalciferol (VITAMIN D3) 400 UNITS CAPS Take 1 Cap by mouth every day.     • loratadine (CLARITIN) 10 MG TABS Take 10 mg by mouth every day. Indications: Hayfever       Current Facility-Administered Medications   Medication Dose Route Frequency Provider Last Rate Last Dose   • heparin pf injection 500 Units  500 Units Intracatheter PRN Ella De, A.P.N.   500 Units at 08/22/17 0856         Social History:     Social History     Social History   • Marital status:      Spouse name: N/A   • Number of children: N/A   • Years of education: N/A     Occupational History   • Not on file.  "    Social History Main Topics   • Smoking status: Never Smoker   • Smokeless tobacco: Never Used   • Alcohol use No      Comment: rare   • Drug use: No   • Sexual activity: No     Other Topics Concern   • Not on file     Social History Narrative   • No narrative on file       Family History:      Family History   Problem Relation Age of Onset   • Heart Disease Mother    • Heart Failure Mother    • Hypertension Mother    • Hyperlipidemia Mother    • Cancer Mother    • Cancer Maternal Aunt 60     breast ca   • Lung Disease Brother      COPD/Emphysema/Smoker   • Cancer Brother      prostate cancer   • Alcohol/Drug Brother      Alcohol   • Kidney Disease Father      renal failure       Review of Systems:  All other review of systems are negative except what was mentioned above in the HPI.    Constitutional: Negative for fever, chills, weight loss and malaise/fatigue.    HEENT: No new auditory or visual complaints. No sore throat and neck pain.     Respiratory: Negative for cough, sputum production, shortness of breath and wheezing.    Cardiovascular: Negative for chest pain, palpitations, orthopnea and leg swelling.    Gastrointestinal: Negative for heartburn, nausea, vomiting and abdominal pain.    Genitourinary: Negative for dysuria, hematuria    Musculoskeletal: No new arthralgias or myalgias   Skin: Negative for rash and itching.    Neurological: Negative for focal weakness and headaches.    Endo/Heme/Allergies: No abnormal bleed/bruise.    Psychiatric/Behavioral: No new depression/anxiety.    Physical Exam:  Vitals: /71   Pulse 100   Temp 36.4 °C (97.5 °F)   Resp 18   Ht 1.651 m (5' 5\")   Wt 77.6 kg (171 lb)   SpO2 100%   BMI 28.46 kg/m²     General: Not in acute distress, alert and oriented x 3  HEENT: No pallor, icterus. Oropharynx clear.   Neck: Supple, no palpable masses.  Lymph nodes: No palpable cervical, supraclavicular, axillary or inguinal lymphadenopathy.    CVS: regular rate and rhythm, no " rubs or gallops  RESP: Clear to auscultate bilaterally, no wheezing or crackles.   ABD: Soft, non tender, non distended, positive bowel sounds, no palpable organomegaly. Ostomy site okay  EXT: No edema or cyanosis  CNS: Alert and oriented x3, No focal deficits.  Skin- No rash      Labs:   Outpatient Infusion Services on 02/06/2018   Component Date Value Ref Range Status   • WBC 02/06/2018 3.3* 4.8 - 10.8 K/uL Final   • RBC 02/06/2018 4.00* 4.20 - 5.40 M/uL Final   • Hemoglobin 02/06/2018 11.7* 12.0 - 16.0 g/dL Final   • Hematocrit 02/06/2018 37.8  37.0 - 47.0 % Final   • MCV 02/06/2018 94.5  81.4 - 97.8 fL Final   • MCH 02/06/2018 29.3  27.0 - 33.0 pg Final   • MCHC 02/06/2018 31.0* 33.6 - 35.0 g/dL Final   • RDW 02/06/2018 53.0* 35.9 - 50.0 fL Final   • Platelet Count 02/06/2018 192  164 - 446 K/uL Final   • MPV 02/06/2018 10.5  9.0 - 12.9 fL Final   • Neutrophils-Polys 02/06/2018 41.20* 44.00 - 72.00 % Final   • Lymphocytes 02/06/2018 30.30  22.00 - 41.00 % Final   • Monocytes 02/06/2018 21.30* 0.00 - 13.40 % Final   • Eosinophils 02/06/2018 5.10  0.00 - 6.90 % Final   • Basophils 02/06/2018 1.80  0.00 - 1.80 % Final   • Immature Granulocytes 02/06/2018 0.30  0.00 - 0.90 % Final   • Nucleated RBC 02/06/2018 0.00  /100 WBC Final   • Neutrophils (Absolute) 02/06/2018 1.37* 2.00 - 7.15 K/uL Final   • Lymphs (Absolute) 02/06/2018 1.01  1.00 - 4.80 K/uL Final   • Monos (Absolute) 02/06/2018 0.71  0.00 - 0.85 K/uL Final   • Eos (Absolute) 02/06/2018 0.17  0.00 - 0.51 K/uL Final   • Baso (Absolute) 02/06/2018 0.06  0.00 - 0.12 K/uL Final   • Immature Granulocytes (abs) 02/06/2018 0.01  0.00 - 0.11 K/uL Final   • NRBC (Absolute) 02/06/2018 0.00  K/uL Final   • Sodium 02/06/2018 138  135 - 145 mmol/L Final   • Potassium 02/06/2018 4.0  3.6 - 5.5 mmol/L Final   • Chloride 02/06/2018 108  96 - 112 mmol/L Final   • Co2 02/06/2018 25  20 - 33 mmol/L Final   • Anion Gap 02/06/2018 5.0  0.0 - 11.9 Final   • Glucose 02/06/2018 93   65 - 99 mg/dL Final   • Bun 02/06/2018 16  8 - 22 mg/dL Final   • Creatinine 02/06/2018 1.05  0.50 - 1.40 mg/dL Final   • Calcium 02/06/2018 8.9  8.5 - 10.5 mg/dL Final   • AST(SGOT) 02/06/2018 41  12 - 45 U/L Final   • ALT(SGPT) 02/06/2018 39  2 - 50 U/L Final   • Alkaline Phosphatase 02/06/2018 203* 30 - 99 U/L Final   • Total Bilirubin 02/06/2018 0.7  0.1 - 1.5 mg/dL Final   • Albumin 02/06/2018 3.6  3.2 - 4.9 g/dL Final   • Total Protein 02/06/2018 5.9* 6.0 - 8.2 g/dL Final   • Globulin 02/06/2018 2.3  1.9 - 3.5 g/dL Final   • A-G Ratio 02/06/2018 1.6  g/dL Final   • GFR If  02/06/2018 >60  >60 mL/min/1.73 m 2 Final   • GFR If Non  02/06/2018 51* >60 mL/min/1.73 m 2 Final         Assessment and Plan:    1.Metastatic rectal cancer, KRAS positive: She had a rather indolent course, but had isolated relapse in the liver. She is status post Y90 theraspheres with normalization of her CEA level. Follow-up CT shows a persistent mass in the upper dome of the liver. Probably, this is treated metastases as her tumor marker has normalized.. She is currently on 5-FU continuous infusion every 2 weeks as maintenance.. She will get the next dose tomorrow. We will skip the upcoming dose as she is going for a cruise. We will obtain restaging CT scan of the abdomen and pelvis in March and consider switching her to Xeloda for convenience.    We will monitor her CEA levels closely. She will return to clinic every 4 weeks with alternate chemotherapy doses.      2. Exertional dyspnea-CT of the chest, grossly unremarkable. She feels better with inhalers. She will follow up with pulmonary. Agree with exercise recommendation.     3 History of pulmonary embolus: He was diagnosed in 8/2015 and was initially treated with Coumadin then changed to Xarelto. She had completed 6 months of therapy but was continued on anticoagulation secondary to underlying malignancy.. She did have significant clot burden and  pulmonary infarction at the time of presentation.  4 Anal repair and reconstruction: Patient was seen by Dr. Montgomery and has had multiple surgeries to this area. Further surgery has been deferred. She continues to follow-up with Dr. Montgomery periodically  She agreed and verbalized her agreement and understanding with the current plan.  I answered all questions and concerns she has at this time         Please note that this dictation was created using voice recognition software. I have made every reasonable attempt to correct obvious errors, but I expect that there are errors of grammar and possibly content that I did not discover before finalizing the note.      SIGNATURES:  Eric Hardy    CC:  Manan Quiñonez M.D.  No ref. provider found

## 2018-02-08 ENCOUNTER — OUTPATIENT INFUSION SERVICES (OUTPATIENT)
Dept: ONCOLOGY | Facility: MEDICAL CENTER | Age: 78
End: 2018-02-08
Attending: INTERNAL MEDICINE
Payer: MEDICARE

## 2018-02-08 VITALS
BODY MASS INDEX: 28.43 KG/M2 | WEIGHT: 170.64 LBS | HEART RATE: 96 BPM | HEIGHT: 65 IN | TEMPERATURE: 97 F | OXYGEN SATURATION: 99 % | RESPIRATION RATE: 18 BRPM | SYSTOLIC BLOOD PRESSURE: 111 MMHG | DIASTOLIC BLOOD PRESSURE: 67 MMHG

## 2018-02-08 DIAGNOSIS — R94.4 DECREASED GFR: ICD-10-CM

## 2018-02-08 DIAGNOSIS — C20 RECTAL CANCER (HCC): ICD-10-CM

## 2018-02-08 PROCEDURE — A4212 NON CORING NEEDLE OR STYLET: HCPCS

## 2018-02-08 PROCEDURE — 700111 HCHG RX REV CODE 636 W/ 250 OVERRIDE (IP)

## 2018-02-08 PROCEDURE — 96523 IRRIG DRUG DELIVERY DEVICE: CPT

## 2018-02-08 RX ADMIN — HEPARIN 500 UNITS: 100 SYRINGE at 11:59

## 2018-02-08 ASSESSMENT — PAIN SCALES - GENERAL: PAINLEVEL: NO PAIN

## 2018-02-08 NOTE — PROGRESS NOTES
Here for 5 FU pump disconnect after 46 hrs of continuous infusion ( Sandra - [facility]). See chemotherapy flow sheet for volume / dose administration. Port flushed per protocol, site d-accessed, needle intact compression dressing applied. Patient discharge home with  in NAD. Next appointment confirmed.     At 0800 patient arrive due to home infusion pump indicating high pressure. Unable to flushed port and no blood return. Site d-accessed and re accessed, with brisk blood return. Infusion completed at 1200 see above.

## 2018-03-07 ENCOUNTER — OUTPATIENT INFUSION SERVICES (OUTPATIENT)
Dept: ONCOLOGY | Facility: MEDICAL CENTER | Age: 78
End: 2018-03-07
Attending: INTERNAL MEDICINE
Payer: MEDICARE

## 2018-03-07 ENCOUNTER — OFFICE VISIT (OUTPATIENT)
Dept: HEMATOLOGY ONCOLOGY | Facility: MEDICAL CENTER | Age: 78
End: 2018-03-07
Payer: MEDICARE

## 2018-03-07 VITALS
WEIGHT: 175.38 LBS | TEMPERATURE: 98.3 F | OXYGEN SATURATION: 96 % | BODY MASS INDEX: 28.87 KG/M2 | DIASTOLIC BLOOD PRESSURE: 60 MMHG | RESPIRATION RATE: 16 BRPM | HEART RATE: 101 BPM | SYSTOLIC BLOOD PRESSURE: 130 MMHG

## 2018-03-07 VITALS
TEMPERATURE: 97.3 F | HEIGHT: 65 IN | BODY MASS INDEX: 29.24 KG/M2 | HEART RATE: 106 BPM | SYSTOLIC BLOOD PRESSURE: 132 MMHG | RESPIRATION RATE: 18 BRPM | DIASTOLIC BLOOD PRESSURE: 54 MMHG | OXYGEN SATURATION: 96 % | WEIGHT: 175.49 LBS

## 2018-03-07 DIAGNOSIS — Z79.01 CURRENT USE OF LONG TERM ANTICOAGULATION: ICD-10-CM

## 2018-03-07 DIAGNOSIS — K59.1 FUNCTIONAL DIARRHEA: ICD-10-CM

## 2018-03-07 DIAGNOSIS — C20 RECTAL CANCER (HCC): ICD-10-CM

## 2018-03-07 DIAGNOSIS — Z51.11 ENCOUNTER FOR ANTINEOPLASTIC CHEMOTHERAPY: ICD-10-CM

## 2018-03-07 DIAGNOSIS — Z86.711 HISTORY OF PULMONARY EMBOLISM: ICD-10-CM

## 2018-03-07 DIAGNOSIS — Z93.2 ILEOSTOMY IN PLACE (HCC): ICD-10-CM

## 2018-03-07 DIAGNOSIS — R05.9 COUGH: ICD-10-CM

## 2018-03-07 DIAGNOSIS — C78.7 SECONDARY MALIGNANT NEOPLASM OF LIVER (HCC): ICD-10-CM

## 2018-03-07 LAB
BASOPHILS # BLD AUTO: 0.7 % (ref 0–1.8)
BASOPHILS # BLD: 0.06 K/UL (ref 0–0.12)
EOSINOPHIL # BLD AUTO: 0.16 K/UL (ref 0–0.51)
EOSINOPHIL NFR BLD: 1.9 % (ref 0–6.9)
ERYTHROCYTE [DISTWIDTH] IN BLOOD BY AUTOMATED COUNT: 53.4 FL (ref 35.9–50)
HCT VFR BLD AUTO: 36.7 % (ref 37–47)
HGB BLD-MCNC: 11.2 G/DL (ref 12–16)
IMM GRANULOCYTES # BLD AUTO: 0.03 K/UL (ref 0–0.11)
IMM GRANULOCYTES NFR BLD AUTO: 0.4 % (ref 0–0.9)
LYMPHOCYTES # BLD AUTO: 0.96 K/UL (ref 1–4.8)
LYMPHOCYTES NFR BLD: 11.3 % (ref 22–41)
MCH RBC QN AUTO: 27.1 PG (ref 27–33)
MCHC RBC AUTO-ENTMCNC: 30.5 G/DL (ref 33.6–35)
MCV RBC AUTO: 88.6 FL (ref 81.4–97.8)
MONOCYTES # BLD AUTO: 0.96 K/UL (ref 0–0.85)
MONOCYTES NFR BLD AUTO: 11.3 % (ref 0–13.4)
NEUTROPHILS # BLD AUTO: 6.35 K/UL (ref 2–7.15)
NEUTROPHILS NFR BLD: 74.4 % (ref 44–72)
NRBC # BLD AUTO: 0 K/UL
NRBC BLD-RTO: 0 /100 WBC
PLATELET # BLD AUTO: 242 K/UL (ref 164–446)
PMV BLD AUTO: 10.5 FL (ref 9–12.9)
RBC # BLD AUTO: 4.14 M/UL (ref 4.2–5.4)
WBC # BLD AUTO: 8.5 K/UL (ref 4.8–10.8)

## 2018-03-07 PROCEDURE — A4212 NON CORING NEEDLE OR STYLET: HCPCS

## 2018-03-07 PROCEDURE — 700111 HCHG RX REV CODE 636 W/ 250 OVERRIDE (IP)

## 2018-03-07 PROCEDURE — 36591 DRAW BLOOD OFF VENOUS DEVICE: CPT

## 2018-03-07 PROCEDURE — 99214 OFFICE O/P EST MOD 30 MIN: CPT | Performed by: NURSE PRACTITIONER

## 2018-03-07 PROCEDURE — 85025 COMPLETE CBC W/AUTO DIFF WBC: CPT

## 2018-03-07 RX ORDER — OSELTAMIVIR PHOSPHATE 75 MG/1
75 CAPSULE ORAL 2 TIMES DAILY
COMMUNITY
End: 2018-04-03

## 2018-03-07 RX ORDER — BENZONATATE 100 MG/1
100 CAPSULE ORAL 3 TIMES DAILY PRN
Qty: 60 CAP | Refills: 0 | Status: SHIPPED | OUTPATIENT
Start: 2018-03-07 | End: 2018-04-03

## 2018-03-07 RX ORDER — OSELTAMIVIR PHOSPHATE 30 MG/1
75 CAPSULE ORAL EVERY 12 HOURS
COMMUNITY
End: 2018-04-03

## 2018-03-07 RX ADMIN — HEPARIN 500 UNITS: 100 SYRINGE at 08:36

## 2018-03-07 ASSESSMENT — ENCOUNTER SYMPTOMS
DIZZINESS: 0
INSOMNIA: 0
VOMITING: 0
SPUTUM PRODUCTION: 1
SHORTNESS OF BREATH: 0
WHEEZING: 0
MYALGIAS: 0
CONSTIPATION: 0
CHILLS: 1
WEIGHT LOSS: 0
HEADACHES: 0
DIARRHEA: 1
TINGLING: 0
NAUSEA: 0
FEVER: 0
COUGH: 1
PALPITATIONS: 0

## 2018-03-07 ASSESSMENT — PAIN SCALES - GENERAL
PAINLEVEL: NO PAIN
PAINLEVEL: 1=MINIMAL PAIN

## 2018-03-07 NOTE — PROGRESS NOTES
Pt did not feel well today, followed-up with her provider and chemotherapy cancelled for today. Pt rescheduled to come back in two weeks.

## 2018-03-07 NOTE — PROGRESS NOTES
"Subjective:      Karlene Walters is a 77 y.o. female who presents for Follow-Up (prechemo ([facility])) for evaluation prior to cycle 47 single agent 5-FU for metastatic rectal cancer.    HPI   Mrs. Walters presents for evaluation prior to cycle 47 single agent 5-FU for metastatic rectal cancer to the liver. She is unaccompanied.    She returned from a cruise yesterday. Her  fell ill during their trip and is presently hospitalized in [place], with confirmed RSV and exacerbation of congestive heart failure. The patient has been experiencing upper respiratory symptoms and was placed on Tamiflu on 3/4/2018. She continues with productive cough, no fevers, intermittent chills.    In regards to cancer and her treatment, she was tolerating single agent 5-FU with minimal side effects. Treatment had previously been adjusted with 20% dose reduction in cycle 21 (12/2016) and discontinuation of bolus 5-FU/LV at cycle 39 due to neutropenia and poor tolerance. She was provided a chemo holiday for travel.    She continues with intermittent bleeding at ileostomy stoma and was advised by Dr. Montgomery to consider decrease of daily Xarelto. She notes increased watery output yesterday and today per ileostomy. She is otherwise asymptomatic.      Allergies   Allergen Reactions   • Codeine      \"gets drunk\"   • Oxaliplatin Anaphylaxis   • Pcn [Penicillins] Itching   • Sulfa Drugs Itching   • Tape Rash     PAPER TAPE OK       Current Outpatient Prescriptions on File Prior to Visit   Medication Sig Dispense Refill   • Tiotropium Bromide-Olodaterol (STIOLTO RESPIMAT) 2.5-2.5 MCG/ACT Aero Soln Take 2 Puffs by mouth every day. 1 Inhaler 11   • ranitidine (ZANTAC) 150 MG Tab TAKE 1 TABLET 2 TIMES A DAY. 60 Tab 3   • XARELTO 20 MG Tab tablet TAKE 1 TABLET WITH DINNER. 30 Tab 6   • cyanocobalamin (VITAMIN B-12) 500 MCG Tab Take 500 mcg by mouth every day.     • Multiple Vitamins-Minerals (CENTRUM SILVER PO) Take  by mouth.     • " Cholecalciferol (VITAMIN D3) 400 UNITS CAPS Take 1 Cap by mouth every day.     • loratadine (CLARITIN) 10 MG TABS Take 10 mg by mouth every day. Indications: Hayfever     • oseltamivir (TAMIFLU) 30 MG Cap Take 75 mg by mouth every 12 hours.     • lidocaine-prilocaine (EMLA) 2.5-2.5 % Cream APPLY A THICK FILM OVER THE PORT ACCESS SITE PRIOR TO ACCESS 30 g 2   • oxycodone immediate-release (ROXICODONE) 5 MG Tab Take 1 Tab by mouth every four hours as needed for Severe Pain. 30 Tab 0   • albuterol (PROAIR HFA) 108 (90 Base) MCG/ACT Aero Soln inhalation aerosol Inhale 2 Puffs by mouth every four hours as needed for Shortness of Breath (wheezing). 1 Inhaler 11   • furosemide (LASIX) 40 MG Tab Take 1 Tab by mouth every day. 30 Tab 11   • potassium chloride ER (KLOR-CON) 10 MEQ tablet Take 1 Tab by mouth every day. 30 Tab 3   • loperamide (IMODIUM) 1 MG/5ML Liquid Take 2 mg by mouth 4 times a day as needed for Diarrhea.     • ondansetron (ZOFRAN) 4 MG Tab tablet        Current Facility-Administered Medications on File Prior to Visit   Medication Dose Route Frequency Provider Last Rate Last Dose   • heparin pf injection 500 Units  500 Units Intracatheter PRN Ella De, A.P.N.   500 Units at 08/22/17 0856       Review of Systems   Constitutional: Positive for chills (intermittent). Negative for fever, malaise/fatigue and weight loss (up 4 lbs. but with trace to 1+ generalized edema).   HENT:        Hoarse   Respiratory: Positive for cough and sputum production (yellow, green). Negative for shortness of breath and wheezing.         Tamiflu start 3/4 x 10 days;  inpatient in  for RSV and associated complications   Cardiovascular: Positive for leg swelling (hands and feet). Negative for chest pain and palpitations.   Gastrointestinal: Positive for diarrhea (ileostomy output wetter and more frequent - as of this morning). Negative for constipation, nausea and vomiting.        Ileostomy - intermittent bleeding due  to Xarelto   Genitourinary: Negative for dysuria.   Musculoskeletal: Negative for myalgias.   Neurological: Negative for dizziness, tingling and headaches.   Psychiatric/Behavioral: The patient does not have insomnia.         Objective:     /60   Pulse (!) 101   Temp 36.8 °C (98.3 °F)   Resp 16   Wt 79.5 kg (175 lb 6 oz)   SpO2 96%   BMI 28.87 kg/m²      Physical Exam   Constitutional: She is oriented to person, place, and time. She appears well-developed and well-nourished. No distress.   HENT:   Head: Normocephalic and atraumatic.   Mouth/Throat: Oropharynx is clear and moist. No oropharyngeal exudate.   Eyes: Conjunctivae and EOM are normal. Pupils are equal, round, and reactive to light. Right eye exhibits no discharge. Left eye exhibits no discharge. No scleral icterus.   Neck: Normal range of motion. Neck supple. No thyromegaly present.   Cardiovascular: Normal rate, regular rhythm, normal heart sounds and intact distal pulses.  Exam reveals no gallop and no friction rub.    No murmur heard.  Mildly tachy   Pulmonary/Chest: Effort normal and breath sounds normal. No respiratory distress. She has no wheezes.   ++wet cough, non productive during visit   Abdominal: Soft. Bowel sounds are normal. She exhibits no distension. There is no tenderness.   Ileostomy; scant bleeding at stoma   Musculoskeletal: Normal range of motion. She exhibits no edema or tenderness.   Lymphadenopathy:        Head (right side): No submental, no submandibular, no tonsillar, no preauricular, no posterior auricular and no occipital adenopathy present.        Head (left side): No submental, no submandibular, no tonsillar, no preauricular, no posterior auricular and no occipital adenopathy present.     She has no cervical adenopathy.        Right cervical: No superficial cervical and no deep cervical adenopathy present.       Left cervical: No superficial cervical and no deep cervical adenopathy present.        Right: No  supraclavicular adenopathy present.        Left: No supraclavicular adenopathy present.   Neurological: She is alert and oriented to person, place, and time.   Skin: Skin is warm and dry. No rash noted. She is not diaphoretic. No erythema. No pallor.   Psychiatric: She has a normal mood and affect. Her behavior is normal.   Vitals reviewed.      Outpatient Infusion Services on 03/07/2018   Component Date Value Ref Range Status   • WBC 03/07/2018 8.5  4.8 - 10.8 K/uL Final   • RBC 03/07/2018 4.14* 4.20 - 5.40 M/uL Final   • Hemoglobin 03/07/2018 11.2* 12.0 - 16.0 g/dL Final   • Hematocrit 03/07/2018 36.7* 37.0 - 47.0 % Final   • MCV 03/07/2018 88.6  81.4 - 97.8 fL Final   • MCH 03/07/2018 27.1  27.0 - 33.0 pg Final   • MCHC 03/07/2018 30.5* 33.6 - 35.0 g/dL Final   • RDW 03/07/2018 53.4* 35.9 - 50.0 fL Final   • Platelet Count 03/07/2018 242  164 - 446 K/uL Final   • MPV 03/07/2018 10.5  9.0 - 12.9 fL Final   • Neutrophils-Polys 03/07/2018 74.40* 44.00 - 72.00 % Final   • Lymphocytes 03/07/2018 11.30* 22.00 - 41.00 % Final   • Monocytes 03/07/2018 11.30  0.00 - 13.40 % Final   • Eosinophils 03/07/2018 1.90  0.00 - 6.90 % Final   • Basophils 03/07/2018 0.70  0.00 - 1.80 % Final   • Immature Granulocytes 03/07/2018 0.40  0.00 - 0.90 % Final   • Nucleated RBC 03/07/2018 0.00  /100 WBC Final   • Neutrophils (Absolute) 03/07/2018 6.35  2.00 - 7.15 K/uL Final   • Lymphs (Absolute) 03/07/2018 0.96* 1.00 - 4.80 K/uL Final   • Monos (Absolute) 03/07/2018 0.96* 0.00 - 0.85 K/uL Final   • Eos (Absolute) 03/07/2018 0.16  0.00 - 0.51 K/uL Final   • Baso (Absolute) 03/07/2018 0.06  0.00 - 0.12 K/uL Final   • Immature Granulocytes (abs) 03/07/2018 0.03  0.00 - 0.11 K/uL Final   • NRBC (Absolute) 03/07/2018 0.00  K/uL Final            Assessment/Plan:     1. Rectal cancer (CMS-HCC)     2. Secondary malignant neoplasm of liver (CMS-HCC)     3. History of pulmonary embolism     4. Current use of long term anticoagulation     5.  Ileostomy in place (CMS-HCC)     6. Functional diarrhea     7. Cough     8. Encounter for antineoplastic chemotherapy       1.  Cough: Her  has been diagnosed with RSV and is presently hospitalized in [place]. She has been placed on Tamiflu per PCP for 10 days. She continues with wet cough that she reports is sometimes productive of green/yellow phlegm. She is using Mucinex twice daily, with adequate water intake. She is provided Tessalon to help with cough relief.    2.  Ileostomy/watery stools: She notes increased watery stools over the past few days, since starting Tamiflu. She will increase water intake, Gatorade, Pedialyte, Pedialyte popsicles to help maintain electrolytes. She continues with Imodium as needed.    3.  PE/long-term anticoagulant use/bleeding stoma: She has history of pulmonary embolism and continues on Xarelto daily. She is noting increased bleeding of stoma, which subsided with missed doses while on vacation. She has followed up with Dr. Montgomery (surgery) who recommends decrease in Xarelto dosing. We'll discuss option of dose reduction with Dr. Hardy, she will also discuss at her next appointment.    4.  Metastatic liver lesion: She is status post Y90 radio embolization of liver lesion on 6/15/2017, without sequelae. She is next due for CT monitoring on Friday 3/9 and follow up with Dr. uJarez later this month. CEA improved to 1.2 on 2/6/2018.    5.  Rectal cancer: She has returned from vacation and is a bit fatigued with URI symptoms. She has been placed on Tamiflu. Although she had a short chemo holiday with vacation we will defer treatment today given her current symptoms. She is advised to continue with supportive therapy. She'll proceed to ER if symptoms do not improve or worsen.    She will return on Monday to review CT scan and plan of care with Dr. Hardy, sooner as needed.    Her next scheduled infusion appointment is 3/20/2018. Scheduling adjustments will be made based on  any treatment plan changes determined by Dr. Hardy at her 3/12/2018 visit.          The patient verbalized agreement and understanding of current plan. All questions and concerns were addressed at time of visit.    Please note that this dictation was created using voice recognition software. I have made every reasonable attempt to correct obvious errors, but I expect that there are errors of grammar and possibly content that I did not discover before finalizing the note.

## 2018-03-07 NOTE — PROGRESS NOTES
"Pt presents ambulatory to [facility] for labs prior to next cycle of 5FU infusion.  Pt reports \"cold\" symptoms since Sunday, when she got back from a cruise.  She has been prescribed Tamiflu.  She is not sure if she has had any fever.  She denies chills.  L chest port accessed in sterile fashion with Power-port needle for CT scheduled this Friday.  Brisk blood return noted from line.  CBC drawn (odd cycle) per treatment plan order.  Port flushed per policy and left accessed.  Pt to see APRN today prior to potential treatment.  Discharged from [facility] in NAD under self care.   "

## 2018-03-07 NOTE — Clinical Note
Dr. Hardy, Is it ok to change her xarelto dose due to the stoma bleeding? Her PEs were about 2.5 years ago  She will discuss with you on Monday if she has not heard from us.

## 2018-03-09 ENCOUNTER — HOSPITAL ENCOUNTER (OUTPATIENT)
Dept: RADIOLOGY | Facility: MEDICAL CENTER | Age: 78
End: 2018-03-09
Attending: NURSE PRACTITIONER
Payer: MEDICARE

## 2018-03-09 ENCOUNTER — NON-PROVIDER VISIT (OUTPATIENT)
Dept: HEMATOLOGY ONCOLOGY | Facility: MEDICAL CENTER | Age: 78
End: 2018-03-09
Payer: MEDICARE

## 2018-03-09 ENCOUNTER — APPOINTMENT (OUTPATIENT)
Dept: ONCOLOGY | Facility: MEDICAL CENTER | Age: 78
End: 2018-03-09
Attending: INTERNAL MEDICINE
Payer: MEDICARE

## 2018-03-09 VITALS
DIASTOLIC BLOOD PRESSURE: 60 MMHG | SYSTOLIC BLOOD PRESSURE: 120 MMHG | OXYGEN SATURATION: 97 % | RESPIRATION RATE: 16 BRPM | TEMPERATURE: 97.9 F | HEART RATE: 94 BPM | HEIGHT: 65 IN | BODY MASS INDEX: 27.99 KG/M2 | WEIGHT: 168 LBS

## 2018-03-09 DIAGNOSIS — C78.7 METASTATIC COLON CANCER TO LIVER (HCC): ICD-10-CM

## 2018-03-09 DIAGNOSIS — C18.9 METASTATIC COLON CANCER TO LIVER (HCC): ICD-10-CM

## 2018-03-09 DIAGNOSIS — Z95.828 PORT CATHETER IN PLACE: ICD-10-CM

## 2018-03-09 PROCEDURE — 74160 CT ABDOMEN W/CONTRAST: CPT

## 2018-03-09 ASSESSMENT — PAIN SCALES - GENERAL: PAINLEVEL: NO PAIN

## 2018-03-09 NOTE — PROGRESS NOTES
Patient ambulatory to clinic for port deaccess. Port deaccessed per protocol. Heparin administered (see MAR). (+) blood return. Patient ambulated out of clinic in no acute distress.

## 2018-03-12 ENCOUNTER — OFFICE VISIT (OUTPATIENT)
Dept: HEMATOLOGY ONCOLOGY | Facility: MEDICAL CENTER | Age: 78
End: 2018-03-12
Payer: MEDICARE

## 2018-03-12 VITALS
OXYGEN SATURATION: 96 % | DIASTOLIC BLOOD PRESSURE: 70 MMHG | SYSTOLIC BLOOD PRESSURE: 132 MMHG | HEART RATE: 109 BPM | RESPIRATION RATE: 14 BRPM | WEIGHT: 167.33 LBS | BODY MASS INDEX: 27.88 KG/M2 | HEIGHT: 65 IN | TEMPERATURE: 97.9 F

## 2018-03-12 DIAGNOSIS — C78.7 SECONDARY MALIGNANT NEOPLASM OF LIVER (HCC): ICD-10-CM

## 2018-03-12 DIAGNOSIS — C20 RECTAL CANCER (HCC): ICD-10-CM

## 2018-03-12 PROCEDURE — 99214 OFFICE O/P EST MOD 30 MIN: CPT | Performed by: INTERNAL MEDICINE

## 2018-03-12 RX ORDER — LIDOCAINE HYDROCHLORIDE 10 MG/ML
10 INJECTION, SOLUTION INFILTRATION; PERINEURAL ONCE
Status: CANCELLED | OUTPATIENT
Start: 2018-04-17 | End: 2018-04-04

## 2018-03-12 RX ORDER — DEXAMETHASONE SODIUM PHOSPHATE 4 MG/ML
8 INJECTION, SOLUTION INTRA-ARTICULAR; INTRALESIONAL; INTRAMUSCULAR; INTRAVENOUS; SOFT TISSUE ONCE
Status: CANCELLED | OUTPATIENT
Start: 2018-04-03 | End: 2018-03-21

## 2018-03-12 RX ORDER — ONDANSETRON 2 MG/ML
4 INJECTION INTRAMUSCULAR; INTRAVENOUS
Status: CANCELLED | OUTPATIENT
Start: 2018-04-03

## 2018-03-12 RX ORDER — DEXTROSE MONOHYDRATE 50 MG/ML
INJECTION, SOLUTION INTRAVENOUS CONTINUOUS
Status: CANCELLED | OUTPATIENT
Start: 2018-04-17

## 2018-03-12 RX ORDER — DEXTROSE MONOHYDRATE 50 MG/ML
INJECTION, SOLUTION INTRAVENOUS CONTINUOUS
Status: CANCELLED | OUTPATIENT
Start: 2018-04-03

## 2018-03-12 RX ORDER — PROCHLORPERAZINE MALEATE 10 MG
10 TABLET ORAL EVERY 6 HOURS PRN
Status: CANCELLED | OUTPATIENT
Start: 2018-04-03

## 2018-03-12 RX ORDER — PROCHLORPERAZINE MALEATE 10 MG
10 TABLET ORAL EVERY 6 HOURS PRN
Status: CANCELLED | OUTPATIENT
Start: 2018-04-17

## 2018-03-12 RX ORDER — DEXAMETHASONE SODIUM PHOSPHATE 4 MG/ML
8 INJECTION, SOLUTION INTRA-ARTICULAR; INTRALESIONAL; INTRAMUSCULAR; INTRAVENOUS; SOFT TISSUE ONCE
Status: CANCELLED | OUTPATIENT
Start: 2018-04-17 | End: 2018-04-04

## 2018-03-12 RX ORDER — ONDANSETRON 8 MG/1
8 TABLET, ORALLY DISINTEGRATING ORAL
Status: CANCELLED | OUTPATIENT
Start: 2018-04-03

## 2018-03-12 RX ORDER — ONDANSETRON 2 MG/ML
4 INJECTION INTRAMUSCULAR; INTRAVENOUS
Status: CANCELLED | OUTPATIENT
Start: 2018-04-17

## 2018-03-12 RX ORDER — LIDOCAINE HYDROCHLORIDE 10 MG/ML
10 INJECTION, SOLUTION INFILTRATION; PERINEURAL ONCE
Status: CANCELLED | OUTPATIENT
Start: 2018-04-03 | End: 2018-03-21

## 2018-03-12 RX ORDER — ONDANSETRON 8 MG/1
8 TABLET, ORALLY DISINTEGRATING ORAL
Status: CANCELLED | OUTPATIENT
Start: 2018-04-17

## 2018-03-12 ASSESSMENT — PAIN SCALES - GENERAL: PAINLEVEL: NO PAIN

## 2018-03-12 NOTE — PROGRESS NOTES
Date of visit: 3/12/2018  11:36 AM      Chief Complaint-  Metastatic rectal cancer to liver, for follow-up.        History of presenting illness: Karlene Walters  is a 77 y.o. year old female who is here for follow-up of metastatic rectal carcinoma.     -Originally diagnosed with a rectal carcinoma in 2012 secondary to bleeding. s/pt laparoscopic resection with primary anastomosis and final pathology was consistent with well-differentiated adenocarcinoma with 0/30 nodes. She was staged pT2 N0. 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      8/2015 she was diagnosed with metastatic disease to the liver with a 2.4 cm mass as well as small pulmonary nodules and thyroid nodules. She underwent an ablative therapy to the liver. She was then started on Xelox. She had  allergic reaction to oxaliplatin and was continued on Xeloda. This eventually led to increased diarrhea and her regimen was changed to single agent 5-FU. She has been tolerating the therapy well with good responses on imaging.      3/2017- CT of the chest abdomen pelvis which has been stable and no evidence of disease. CT of the wrist was consistent with tenosynovitis and degenerative changes.     5/17-established care with me   EGD and colonoscopy  did not reveal any local recurrence. She had some gastritis.   - CEA has trended up to 13  -PET scan isolated hepatic lesion. Patchy basilar pulmonary opacities. Seen by Dr. West referred for radial embolization  6/17- Y90 Theraspheres.  Continued on 5-FU.  -CEA trending down/normalized     -CT abdomen and-3/9/18-Oval-shaped low attenuation lesion in segment 8 of the liver is again identified which appears smaller than the prior exam. No enhancement within or around this lesion is appreciated. Findings are consistent with posttreatment changes in this portion of the liver after Y 90  procedure.    . She went for a cruise and will refer her 5-FU dose was held. She did develop some conjunctivitis.  Past Medical History:      Past Medical History:   Diagnosis Date   • Arrhythmia    • Blood clotting disorder (CMS-HCC) 08/2015    bilat PE    • Blood transfusion 1957   • Cancer (CMS-HCC) 09/2012    rectal cancer,  Liver CA-2015   • CATARACT     removed b/l   • Chemotherapy-induced neutropenia (CMS-HCC) 9/28/2016   • Chickenpox    • Colon cancer (CMS-HCC)    • Dental disorder     dentures UPPERS AND LOWERS   • Elevated alkaline phosphatase level 11/15/2017   • Fall    • Egyptian measles    • Hemorrhagic disorder (CMS-HCC)     on Xarelto    • Hypercholesteremia    • Hypertension     no meds currently    • Ileostomy in place (CMS-HCC)    • Indigestion    • Influenza    • Lightheadedness 9/17/2015   • Mumps    • Obstruction     ileostomy   • Other specified symptom associated with female genital organs     HYSTERECTOMY 1993   • Pneumonia 05/2017    tx'd with antibx   • Pulmonary embolism (CMS-HCC)    • 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)    • Renal insufficiency 3/14/2016   • Routine general medical examination at a health care facility 3/14/2016    3/14/16   • Seasonal allergies    • Tonsillitis        Past surgical history:       Past Surgical History:   Procedure Laterality Date   • COLONOSCOPY WITH BIOPSY  8/23/2015    Procedure: COLONOSCOPY WITH BIOPSY;  Surgeon: Wilbur Glasgow M.D.;  Location: ENDOSCOPY [facility] ([place]  Service:    • HEPATIC ABLATION LAPAROSCOPIC  7/6/2015    Procedure: HEPATIC ABLATION LAPAROSCOPIC.;  Surgeon: Kit West M.D.;  Location: SURGERY [facility];  Service:    • CATH PLACEMENT Left 7/6/2015    Procedure: CATH PLACEMENT CEPHALIC POWERPORT;  Surgeon: Kit West M.D.;  Location: SURGERY [facility];  Service:    • FISTULA IN ANO REPAIR  1/28/2015    Performed by Kolton Montgomery M.D. at [facility]   • PERINEAL  PROCEDURE  1/28/2015    Performed by Kolton Montgomery M.D. at SURGERY [facility]   • FISTULA IN ANO REPAIR  10/15/2014    Performed by Kolton Montgomery M.D. at SURGERY [facility]   • PERINEAL PROCEDURE  10/15/2014    Performed by Kolton Montgomery M.D. at [facility]   • IRRIGATION & DEBRIDEMENT GENERAL  2/19/2014    Performed by Kolton Montgomery M.D. at SURGERY [facility]   • FLAP GRAFT  2/19/2014    Performed by Kolton Montgomery M.D. at [facility]   • PERINEAL PROCEDURE  12/18/2013    Performed by Kolton Montgomery M.D. at SURGERY [facility]   • MYOCUTANEOUS FLAP  12/18/2013    Performed by Kolton Montgomery M.D. at [facility]   • IRRIGATION & DEBRIDEMENT GENERAL  10/23/2013    Performed by Kolton Montgomery M.D. at SURGERY [facility]   • FISTULA IN ANO REPAIR  9/4/2013    Performed by Kolton Montgomery M.D. at [facility]   • FLAP ROTATION  9/4/2013    Performed by Kolton Montgomery M.D. at [facility]   • RECOVERY  8/26/2013    Performed by Cath-Recovery Surgery at SURGERY SAME DAY [facility]   • PROCTOSCOPY  7/17/2013    Performed by Kolton Montgomery M.D. at [facility]   • BIOPSY GENERAL  7/17/2013    Performed by Kolton Montgomery M.D. at [facility]   • SIGMOIDOSCOPY  2/27/2013    Performed by Kolton Montgomery M.D. at [facility]   • FISTULA IN ANO REPAIR  2/27/2013    Performed by Kolton Montgomery M.D. at [facility]   • LAPAROSCOPY  10/8/2012    Performed by Kolton Montgomery M.D. at [facility]   • ILEO LOOP DIVERSION  10/8/2012    Performed by Kolton Montgomery M.D. at [facility]   • COLECTOMY  9/26/2012    Performed by Kolton Montgomery M.D. at [facility]   • COLONOSCOPY FLEX W/ENDO US  9/20/2012    Performed by Harshil Bolton M.D. at SURGERY [facility]   • OTHER  2009    left eye torn retina repair   • CATARACT EXTRACTION WITH IOL  2004    bilateral   • ABDOMINAL HYSTERECTOMY TOTAL   1983   • CYST EXCISION  1972    ovarian   • COLON RESECTION     • EYE SURGERY      cataracts   • HYSTERECTOMY LAPAROSCOPY     • PB REMV 2ND CATARACT,CORN-SCLER SECTN     • TONSILLECTOMY         Allergies:       Codeine; Oxaliplatin; Pcn [penicillins]; Sulfa drugs; and Tape    Medications:         Current Outpatient Prescriptions   Medication Sig Dispense Refill   • oseltamivir (TAMIFLU) 30 MG Cap Take 75 mg by mouth every 12 hours.     • oseltamivir (TAMIFLU) 75 MG Cap Take 75 mg by mouth 2 times a day.     • benzonatate (TESSALON) 100 MG Cap Take 1 Cap by mouth 3 times a day as needed for Cough. 60 Cap 0   • lidocaine-prilocaine (EMLA) 2.5-2.5 % Cream APPLY A THICK FILM OVER THE PORT ACCESS SITE PRIOR TO ACCESS 30 g 2   • oxycodone immediate-release (ROXICODONE) 5 MG Tab Take 1 Tab by mouth every four hours as needed for Severe Pain. 30 Tab 0   • Tiotropium Bromide-Olodaterol (STIOLTO RESPIMAT) 2.5-2.5 MCG/ACT Aero Soln Take 2 Puffs by mouth every day. 1 Inhaler 11   • albuterol (PROAIR HFA) 108 (90 Base) MCG/ACT Aero Soln inhalation aerosol Inhale 2 Puffs by mouth every four hours as needed for Shortness of Breath (wheezing). 1 Inhaler 11   • furosemide (LASIX) 40 MG Tab Take 1 Tab by mouth every day. 30 Tab 11   • ranitidine (ZANTAC) 150 MG Tab TAKE 1 TABLET 2 TIMES A DAY. 60 Tab 3   • XARELTO 20 MG Tab tablet TAKE 1 TABLET WITH DINNER. 30 Tab 6   • potassium chloride ER (KLOR-CON) 10 MEQ tablet Take 1 Tab by mouth every day. 30 Tab 3   • loperamide (IMODIUM) 1 MG/5ML Liquid Take 2 mg by mouth 4 times a day as needed for Diarrhea.     • ondansetron (ZOFRAN) 4 MG Tab tablet      • cyanocobalamin (VITAMIN B-12) 500 MCG Tab Take 500 mcg by mouth every day.     • Multiple Vitamins-Minerals (CENTRUM SILVER PO) Take  by mouth.     • Cholecalciferol (VITAMIN D3) 400 UNITS CAPS Take 1 Cap by mouth every day.     • loratadine (CLARITIN) 10 MG TABS Take 10 mg by mouth every day. Indications: Hayfever       Current  Facility-Administered Medications   Medication Dose Route Frequency Provider Last Rate Last Dose   • heparin pf injection 500 Units  500 Units Intracatheter PRN Eric Hardy M.D.   500 Units at 03/09/18 1542   • heparin pf injection 500 Units  500 Units Intracatheter PRN ARTURO Krishnan   500 Units at 08/22/17 0856         Social History:     Social History     Social History   • Marital status:      Spouse name: N/A   • Number of children: N/A   • Years of education: N/A     Occupational History   • Not on file.     Social History Main Topics   • Smoking status: Never Smoker   • Smokeless tobacco: Never Used   • Alcohol use No      Comment: rare   • Drug use: No   • Sexual activity: No     Other Topics Concern   • Not on file     Social History Narrative   • No narrative on file       Family History:      Family History   Problem Relation Age of Onset   • Heart Disease Mother    • Heart Failure Mother    • Hypertension Mother    • Hyperlipidemia Mother    • Cancer Mother    • Cancer Maternal Aunt 60     breast ca   • Lung Disease Brother      COPD/Emphysema/Smoker   • Cancer Brother      prostate cancer   • Alcohol/Drug Brother      Alcohol   • Kidney Disease Father      renal failure       Review of Systems:  All other review of systems are negative except what was mentioned above in the HPI.    Constitutional: Negative for fever, chills, weight loss and malaise/fatigue.    HEENT: No new auditory or visual complaints. No sore throat and neck pain.     Respiratory: Negative for cough, sputum production, shortness of breath and wheezing.    Cardiovascular: Negative for chest pain, palpitations, orthopnea and leg swelling.    Gastrointestinal: Negative for heartburn, nausea, vomiting and abdominal pain.    Genitourinary: Negative for dysuria, hematuria    Musculoskeletal: No new arthralgias or myalgias   Skin: Negative for rash and itching.    Neurological: Negative for focal weakness and  "headaches.    Endo/Heme/Allergies: No abnormal bleed/bruise.    Psychiatric/Behavioral: No new depression/anxiety.    Physical Exam:  Vitals: Pulse (!) 109   Temp 36.6 °C (97.9 °F)   Resp 14   Ht 1.651 m (5' 5\")   Wt 75.9 kg (167 lb 5.3 oz)   SpO2 96%   BMI 27.85 kg/m²     General: Not in acute distress, alert and oriented x 3  HEENT: No pallor, icterus. Oropharynx clear.   Neck: Supple, no palpable masses.  Lymph nodes: No palpable cervical, supraclavicular, axillary or inguinal lymphadenopathy.    CVS: regular rate and rhythm, no rubs or gallops  RESP: Clear to auscultate bilaterally, no wheezing or crackles.   ABD: Soft, non tender, non distended, positive bowel sounds, no palpable organomegaly  EXT: No edema or cyanosis  CNS: Alert and oriented x3, No focal deficits.  Skin- No rash      Labs:   Outpatient Infusion Services on 03/07/2018   Component Date Value Ref Range Status   • WBC 03/07/2018 8.5  4.8 - 10.8 K/uL Final   • RBC 03/07/2018 4.14* 4.20 - 5.40 M/uL Final   • Hemoglobin 03/07/2018 11.2* 12.0 - 16.0 g/dL Final   • Hematocrit 03/07/2018 36.7* 37.0 - 47.0 % Final   • MCV 03/07/2018 88.6  81.4 - 97.8 fL Final   • MCH 03/07/2018 27.1  27.0 - 33.0 pg Final   • MCHC 03/07/2018 30.5* 33.6 - 35.0 g/dL Final   • RDW 03/07/2018 53.4* 35.9 - 50.0 fL Final   • Platelet Count 03/07/2018 242  164 - 446 K/uL Final   • MPV 03/07/2018 10.5  9.0 - 12.9 fL Final   • Neutrophils-Polys 03/07/2018 74.40* 44.00 - 72.00 % Final   • Lymphocytes 03/07/2018 11.30* 22.00 - 41.00 % Final   • Monocytes 03/07/2018 11.30  0.00 - 13.40 % Final   • Eosinophils 03/07/2018 1.90  0.00 - 6.90 % Final   • Basophils 03/07/2018 0.70  0.00 - 1.80 % Final   • Immature Granulocytes 03/07/2018 0.40  0.00 - 0.90 % Final   • Nucleated RBC 03/07/2018 0.00  /100 WBC Final   • Neutrophils (Absolute) 03/07/2018 6.35  2.00 - 7.15 K/uL Final   • Lymphs (Absolute) 03/07/2018 0.96* 1.00 - 4.80 K/uL Final   • Monos (Absolute) 03/07/2018 0.96* 0.00 - " 0.85 K/uL Final   • Eos (Absolute) 03/07/2018 0.16  0.00 - 0.51 K/uL Final   • Baso (Absolute) 03/07/2018 0.06  0.00 - 0.12 K/uL Final   • Immature Granulocytes (abs) 03/07/2018 0.03  0.00 - 0.11 K/uL Final   • NRBC (Absolute) 03/07/2018 0.00  K/uL Final       Assessment and Plan:  1.Metastatic rectal cancer, KRAS positive: She had a rather indolent course, but had isolated relapse in the liver. She is status post Y90 theraspheres with normalization of her CEA level. Reviewed the CT images, which is consistent with treated metastases . No other evidence of progression. Her tumor marker has normalized.. She is currently on 5-FU continuous infusion every 2 weeks as maintenance. We will monitor her CEA levels closely. She will return to clinic every 4 weeks with alternate chemotherapy doses.      2. Exertional dyspnea-CT of the chest, grossly unremarkable. She feels better with inhalers. She will follow up with pulmonary. Agree with exercise recommendation.    3 History of pulmonary embolus: He was diagnosed in 8/2015 and was initially treated with Coumadin then changed to Xarelto. She had completed 6 months of therapy but was continued on anticoagulation secondary to underlying malignancy.. She did have significant clot burden and pulmonary infarction at the time of presentation.. She recently had some bleeding around the stoma. It would be okay for her to reduce the Eliquis to 2.5 mg twice a day.    4 Anal repair and reconstruction: Patient was seen by Dr. Montgomery and has had multiple surgeries to this area. Further surgery has been deferred. She continues to follow-up with Dr. Montgomery periodically  She agreed and verbalized her agreement and understanding with the current plan.  I answered all questions and concerns she has at this time     #5-left menge-she will talk to her eye doctor today.  She agreed and verbalized her agreement and understanding with the current plan.  I answered all questions and concerns she  has at this time         Please note that this dictation was created using voice recognition software. I have made every reasonable attempt to correct obvious errors, but I expect that there are errors of grammar and possibly content that I did not discover before finalizing the note.      SIGNATURES:  Eric Hardy    CC:  Manan Quiñonez M.D.  No ref. provider found

## 2018-03-15 ENCOUNTER — TELEPHONE (OUTPATIENT)
Dept: RADIOLOGY | Facility: MEDICAL CENTER | Age: 78
End: 2018-03-15

## 2018-03-15 NOTE — TELEPHONE ENCOUNTER
Imaging and labs reviewed with Dr. Juarez, which show no evidence of disease progression after Y90. Recommend a 3 month scan. Results and plan relayed to pt.

## 2018-03-19 NOTE — PROGRESS NOTES
"Pharmacy Chemotherapy Verification Note:    Patient Name: Karlene Walters      Dx: Colon CA        Cycle 55 delayed four weeks due to vacation and URI symptoms   Previous treatment: C54 = 2/6/18    Protocol: 5-FU+Leucovorin        *Dosing Reference*   IV over 2 hours on Day 1, followed by   IVP on Day 1, followed by    - Dose reduced by 20% to 320 mg/m² starting with Cycle 18 ([place] #11) on 7/5/16 due to neutropenia    10/31/17: delete Leucovorin and 5-FU push d/t neutropenia per Dr. Hardy   Fluorouracil 1200 mg/m²  IV continuous infusion daily on Days 1 and 2 (2400 mg/m² IV over 46-48 hours) -    - Infusion Dose reduced by 20% starting with Cycle 18 ([place] #11) on 7/5/16 due to neutropenia   -20% reduction (1920 mg/m2) to be continued starting C28 per C Alsop APRN   14 day cycles for 12 cycles (adjuvant) or until disease progression or unacceptable toxicity     NCCN Guidelines for Colon Cancer. V.2.2015.  Jay GROVER, et al. Eur J Cancer.1999;35(9):1343-7.      Allergies:  Codeine; Oxaliplatin; Pcn; Sulfa drugs; and Tape       /76   Pulse (!) 107   Temp 36.4 °C (97.5 °F)   Resp 18   Ht 1.66 m (5' 5.35\")   Wt 74.4 kg (164 lb 0.4 oz)   SpO2 100%   BMI 27.00 kg/m²    Body surface area is 1.85 meters squared.     ANC~ 2550 (ok if > 1000) Plt = 284k   Hgb = 12.6  SCr = 0.99 mg/dL CrCl ~ 56 mL/min   LFT's = WNL, except AP = 240 TBili = 0.6     Fluorouracil (5-FU) 1920 mg/m² x 1.85 m² = 3552 mg              <5% difference, OK to treat with final written dose = 3550 mg CIVI               via CADD pump at 1.5 ml/hr x 46 hour     Norris Mitchell, PharmD    "

## 2018-03-20 ENCOUNTER — DOCUMENTATION (OUTPATIENT)
Dept: HEMATOLOGY ONCOLOGY | Facility: MEDICAL CENTER | Age: 78
End: 2018-03-20

## 2018-03-20 ENCOUNTER — OUTPATIENT INFUSION SERVICES (OUTPATIENT)
Dept: ONCOLOGY | Facility: MEDICAL CENTER | Age: 78
End: 2018-03-20
Attending: INTERNAL MEDICINE
Payer: MEDICARE

## 2018-03-20 ENCOUNTER — APPOINTMENT (OUTPATIENT)
Dept: HEMATOLOGY ONCOLOGY | Facility: MEDICAL CENTER | Age: 78
End: 2018-03-20
Payer: MEDICARE

## 2018-03-20 VITALS
SYSTOLIC BLOOD PRESSURE: 126 MMHG | TEMPERATURE: 97.5 F | WEIGHT: 164.02 LBS | HEIGHT: 65 IN | BODY MASS INDEX: 27.33 KG/M2 | HEART RATE: 107 BPM | RESPIRATION RATE: 18 BRPM | DIASTOLIC BLOOD PRESSURE: 76 MMHG | OXYGEN SATURATION: 100 %

## 2018-03-20 DIAGNOSIS — R94.4 DECREASED GFR: ICD-10-CM

## 2018-03-20 DIAGNOSIS — C20 RECTAL CANCER (HCC): ICD-10-CM

## 2018-03-20 LAB
ALBUMIN SERPL BCP-MCNC: 3.6 G/DL (ref 3.2–4.9)
ALBUMIN/GLOB SERPL: 1.3 G/DL
ALP SERPL-CCNC: 240 U/L (ref 30–99)
ALT SERPL-CCNC: 37 U/L (ref 2–50)
ANION GAP SERPL CALC-SCNC: 9 MMOL/L (ref 0–11.9)
AST SERPL-CCNC: 41 U/L (ref 12–45)
BASOPHILS # BLD AUTO: 0.8 % (ref 0–1.8)
BASOPHILS # BLD: 0.04 K/UL (ref 0–0.12)
BILIRUB SERPL-MCNC: 0.6 MG/DL (ref 0.1–1.5)
BUN SERPL-MCNC: 17 MG/DL (ref 8–22)
CALCIUM SERPL-MCNC: 9.1 MG/DL (ref 8.5–10.5)
CEA SERPL-MCNC: 1.9 NG/ML (ref 0–3)
CHLORIDE SERPL-SCNC: 109 MMOL/L (ref 96–112)
CO2 SERPL-SCNC: 21 MMOL/L (ref 20–33)
CREAT SERPL-MCNC: 0.99 MG/DL (ref 0.5–1.4)
EOSINOPHIL # BLD AUTO: 0.14 K/UL (ref 0–0.51)
EOSINOPHIL NFR BLD: 2.9 % (ref 0–6.9)
ERYTHROCYTE [DISTWIDTH] IN BLOOD BY AUTOMATED COUNT: 54 FL (ref 35.9–50)
GLOBULIN SER CALC-MCNC: 2.7 G/DL (ref 1.9–3.5)
GLUCOSE SERPL-MCNC: 99 MG/DL (ref 65–99)
HCT VFR BLD AUTO: 41.2 % (ref 37–47)
HGB BLD-MCNC: 12.6 G/DL (ref 12–16)
IMM GRANULOCYTES # BLD AUTO: 0.03 K/UL (ref 0–0.11)
IMM GRANULOCYTES NFR BLD AUTO: 0.6 % (ref 0–0.9)
LYMPHOCYTES # BLD AUTO: 1.58 K/UL (ref 1–4.8)
LYMPHOCYTES NFR BLD: 32.3 % (ref 22–41)
MCH RBC QN AUTO: 26.9 PG (ref 27–33)
MCHC RBC AUTO-ENTMCNC: 30.6 G/DL (ref 33.6–35)
MCV RBC AUTO: 88 FL (ref 81.4–97.8)
MONOCYTES # BLD AUTO: 0.55 K/UL (ref 0–0.85)
MONOCYTES NFR BLD AUTO: 11.2 % (ref 0–13.4)
NEUTROPHILS # BLD AUTO: 2.55 K/UL (ref 2–7.15)
NEUTROPHILS NFR BLD: 52.2 % (ref 44–72)
NRBC # BLD AUTO: 0 K/UL
NRBC BLD-RTO: 0 /100 WBC
PLATELET # BLD AUTO: 284 K/UL (ref 164–446)
PMV BLD AUTO: 10.2 FL (ref 9–12.9)
POTASSIUM SERPL-SCNC: 4.3 MMOL/L (ref 3.6–5.5)
PROT SERPL-MCNC: 6.3 G/DL (ref 6–8.2)
RBC # BLD AUTO: 4.68 M/UL (ref 4.2–5.4)
SODIUM SERPL-SCNC: 139 MMOL/L (ref 135–145)
WBC # BLD AUTO: 4.9 K/UL (ref 4.8–10.8)

## 2018-03-20 PROCEDURE — 96375 TX/PRO/DX INJ NEW DRUG ADDON: CPT

## 2018-03-20 PROCEDURE — 85025 COMPLETE CBC W/AUTO DIFF WBC: CPT

## 2018-03-20 PROCEDURE — A4212 NON CORING NEEDLE OR STYLET: HCPCS

## 2018-03-20 PROCEDURE — 82378 CARCINOEMBRYONIC ANTIGEN: CPT

## 2018-03-20 PROCEDURE — 700111 HCHG RX REV CODE 636 W/ 250 OVERRIDE (IP)

## 2018-03-20 PROCEDURE — 80053 COMPREHEN METABOLIC PANEL: CPT

## 2018-03-20 PROCEDURE — 96374 THER/PROPH/DIAG INJ IV PUSH: CPT | Mod: XU

## 2018-03-20 PROCEDURE — G0498 CHEMO EXTEND IV INFUS W/PUMP: HCPCS

## 2018-03-20 PROCEDURE — 700111 HCHG RX REV CODE 636 W/ 250 OVERRIDE (IP): Performed by: INTERNAL MEDICINE

## 2018-03-20 RX ORDER — DEXAMETHASONE SODIUM PHOSPHATE 4 MG/ML
8 INJECTION, SOLUTION INTRA-ARTICULAR; INTRALESIONAL; INTRAMUSCULAR; INTRAVENOUS; SOFT TISSUE ONCE
Status: COMPLETED | OUTPATIENT
Start: 2018-03-20 | End: 2018-03-20

## 2018-03-20 RX ADMIN — DEXAMETHASONE SODIUM PHOSPHATE 8 MG: 4 INJECTION, SOLUTION INTRAMUSCULAR; INTRAVENOUS at 10:21

## 2018-03-20 RX ADMIN — FLUOROURACIL 3550 MG: 50 INJECTION, SOLUTION INTRAVENOUS at 11:13

## 2018-03-20 ASSESSMENT — PAIN SCALES - GENERAL: PAINLEVEL: NO PAIN

## 2018-03-20 NOTE — PROGRESS NOTES
"Pharmacy Chemotherapy Verification Note:    Patient Name: Karlene Walters      Dx: Colon CA        Protocol: 5-FU+Leucovorin  *Dosing Reference*       IV over 2 hours on Day 1, followed by    IVP on Day 1, followed by         -- 10/31/17: delete Leucovorin and 5-FU push d/t neutropenia per Dr. Hardy   Fluorouracil   IV continuous infusion daily on Days 1 and 2 ( IV over 46-48 hours)        -- 20% reduction (1920 mg/m2) to be continued starting C28 per C Alsop APRN   14 day cycles for 12 cycles (adjuvant) or until disease progression or unacceptable toxicity   NCCN Guidelines for Colon Cancer. V.2.2015.  Jay T, et al. Eur J Cancer.1999;35(9):1343-7.      Allergies:  Codeine; Oxaliplatin; Pcn; Sulfa drugs; and Tape       /76   Pulse (!) 107   Temp 36.4 °C (97.5 °F)   Resp 18   Ht 1.66 m (5' 5.35\")   Wt 74.4 kg (164 lb 0.4 oz)   SpO2 100%   BMI 27.00 kg/m²  Body surface area is 1.85 meters squared.    Labs 3/20/18:  ANC~ 2550   Plt = 284 k     Hgb = 12.6     SCr = 0.99 mg/dL  CrCl ~ 56 mL/min   K = 4.3  AST/ALT/AP = 41/37/240 (no adjustments specified) TBili = 0.6       Drug Order   (Drug name, dose, route, IV Fluid & volume, frequency, number of doses) Cycle 55 (C47 in [place]) - delayed d/t vacation and URI  Previous treatment: C54 = 2/6/18     Medication = Fluorouracil (5-FU)   Base Dose = 1920 mg/m²/dose  Calc Dose:Base Dose x 1.85 m² = 3552 mg  Final Dose = 3550 mg   Route = CIVI  Conc = 50 mg/mL = 71 mL   Fluid & Volume = CADD pump 71 mL (+ 3 mL overfill = 74 mL)  Admin Duration = Over 46 hours  Rate = 1.5 mL/hour   Via CADD pump for home infusion        <5% difference, ok to treat with final dose     By my signature below, I confirm this process was performed independently with the BSA and all final chemotherapy dosing calculations congruent. I have reviewed the above chemotherapy order and that my calculation of the final dose and BSA (when applicable) corroborate those calculations of the "  pharmacist.       Yi Knott, PharmD

## 2018-03-20 NOTE — PROGRESS NOTES
Nutrition Services: Brief Update  Met w/ pt this date to follow-up on current intake and appetite. Pt reports that her intake remains good w/ no drastic changes since previous assessment. Pt also denies any recent c/o GI distress, taste changes, or swallowing difficulty. Otherwise, pt continues to remain well nourished w/ stable wt and adequate PO intake, and labs WNL. Otherwise, RD to sign off, please re-consult us if any changes in intake or weight occurs. RD available PRN.

## 2018-03-20 NOTE — PROGRESS NOTES
Chemotherapy Verification - PRIMARY RN      Height = 166 cm  Weight = 74.4 kg  BSA = 1.85 m2       Medication: 5FU 46 hour home infusion  Dose: 1920 mg/m2  Calculated Dose: 3553.85 mg                             (In mg/m2, AUC, mg/kg)     I confirm this process was performed independently with the BSA and all final chemotherapy dosing calculations congruent.  Any discrepancies of 5% or greater have been addressed with the chemotherapy pharmacist. The resolution of the discrepancy has been documented in the EPIC progress notes.

## 2018-03-20 NOTE — PROGRESS NOTES
Pt presents ambulatory to IS for 5FU pump connect for 46 hour home infusion.  Pt reports feeling much better since last visit, she denies fevers, chills or other signs of infection.  She does report a dry, non-productive cough.  POC discussed.  L chest port in place with EMLA cream, accessed in sterile fashion.  Brick blood return noted.  Labs drawn, CMP and CEA added due to last draw being 6 weeks ago.  Results reviewed, pt meets parameters for treatment today.  Premedication administered per MAR.  Pump connected without complication.  Pt returns Thursday for disconnect, appointment time confirmed.  Pt discharged from IS in NAD with spouse.

## 2018-03-20 NOTE — PROGRESS NOTES
Chemotherapy Verification - SECONDARY RN       Height = 166 cm  Weight = 74.4 kg  BSA = 1.85 m2       Medication: Home infusion 5FU  Dose: 1920 mg/m2  Calculated Dose: 3556.2 mg                             (In mg/m2, AUC, mg/kg)       I confirm that this process was performed independently.

## 2018-03-22 ENCOUNTER — OUTPATIENT INFUSION SERVICES (OUTPATIENT)
Dept: ONCOLOGY | Facility: MEDICAL CENTER | Age: 78
End: 2018-03-22
Attending: INTERNAL MEDICINE
Payer: MEDICARE

## 2018-03-22 VITALS
WEIGHT: 163.36 LBS | HEIGHT: 65 IN | TEMPERATURE: 97.3 F | OXYGEN SATURATION: 95 % | BODY MASS INDEX: 27.22 KG/M2 | SYSTOLIC BLOOD PRESSURE: 128 MMHG | HEART RATE: 120 BPM | DIASTOLIC BLOOD PRESSURE: 64 MMHG | RESPIRATION RATE: 18 BRPM

## 2018-03-22 DIAGNOSIS — R94.4 DECREASED GFR: ICD-10-CM

## 2018-03-22 DIAGNOSIS — C20 RECTAL CANCER (HCC): ICD-10-CM

## 2018-03-22 PROCEDURE — 700111 HCHG RX REV CODE 636 W/ 250 OVERRIDE (IP)

## 2018-03-22 PROCEDURE — 96523 IRRIG DRUG DELIVERY DEVICE: CPT

## 2018-03-22 RX ADMIN — HEPARIN 500 UNITS: 100 SYRINGE at 13:46

## 2018-03-22 ASSESSMENT — PAIN SCALES - GENERAL: PAINLEVEL: NO PAIN

## 2018-03-22 NOTE — PROGRESS NOTES
Patient arrived for CADD pump de-access. Pt reports increase in pain due to not having tx for last 6 weeks.  Pump stopped with 0ml remaining.  Disconnected pump, cleaned pump and placed in pharmacy drawer.  Port flushed with blood return. Flushed per protocol, de-accessed and gauze dressing applied.  Confirmed pt's next appt. Pt discharged home under no apparent distress.

## 2018-03-23 DIAGNOSIS — R12 HEARTBURN: ICD-10-CM

## 2018-03-23 DIAGNOSIS — C20 RECTAL CANCER (HCC): ICD-10-CM

## 2018-03-23 RX ORDER — RANITIDINE 150 MG/1
TABLET ORAL
Qty: 60 TAB | Refills: 3 | Status: SHIPPED | OUTPATIENT
Start: 2018-03-23 | End: 2018-07-17 | Stop reason: SDUPTHER

## 2018-04-02 NOTE — PROGRESS NOTES
"Pharmacy Chemotherapy Verification Note:    Patient Name: Karlene Walters      Dx: Colon CA        Protocol: 5-FU+Leucovorin  *Dosing Reference*       IV over 2 hours on Day 1, followed by    IVP on Day 1, followed by         -- 10/31/17: delete Leucovorin and 5-FU push d/t neutropenia per Dr. Hardy   Fluorouracil   IV continuous infusion daily on Days 1 and 2 ( IV over 46-48 hours)        -- 20% reduction (1920 mg/m2) to be continued starting C28 per C Alsop APRN   14 day cycles for 12 cycles (adjuvant) or until disease progression or unacceptable toxicity   NCCN Guidelines for Colon Cancer. V.2.2015.  Jay T, et al. Eur J Cancer.1999;35(9):1343-7.      Allergies:  Codeine; Oxaliplatin; Pcn; Sulfa drugs; and Tape       /71   Pulse (!) 106   Temp 36.1 °C (97 °F)   Resp 18   Ht 1.66 m (5' 5.35\")   Wt 74.6 kg (164 lb 7.4 oz)   SpO2 100%   BMI 27.07 kg/m²  Body surface area is 1.85 meters squared.    ANC~ 2790 Plt = 202k   Hgb = 12.2     SCr = 1.16mg/dL CrCl ~ 48mL/min   LFT's = WNL, except Ap = 209 TBili = 0.8         Drug Order   (Drug name, dose, route, IV Fluid & volume, frequency, number of doses) Cycle 56 (C48 in [place])  Previous treatment: C55 = 3/20/18     Medication = Fluorouracil (5-FU)   Base Dose = 1920 mg/m²  Calc Dose:Base Dose x 1.85m² = 3552mg  Final Dose = 3550mg   Route = CIVI  Fluid & Volume = CADD pump mL (+ 3 mL overfill = 74mL)  Admin Duration = Over 46 hours  Rate = 1.5mL/hour   Via CADD pump for home infusion        <5% difference, ok to treat with final dose     By my signature below, I confirm this process was performed independently with the BSA and all final chemotherapy dosing calculations congruent. I have reviewed the above chemotherapy order and that my calculation of the final dose and BSA (when applicable) corroborate those calculations of the  pharmacist.       Nakia Arroyo, PharmD    "

## 2018-04-03 ENCOUNTER — OUTPATIENT INFUSION SERVICES (OUTPATIENT)
Dept: ONCOLOGY | Facility: MEDICAL CENTER | Age: 78
End: 2018-04-03
Attending: INTERNAL MEDICINE
Payer: MEDICARE

## 2018-04-03 ENCOUNTER — TELEPHONE (OUTPATIENT)
Dept: HEMATOLOGY ONCOLOGY | Facility: MEDICAL CENTER | Age: 78
End: 2018-04-03

## 2018-04-03 ENCOUNTER — OFFICE VISIT (OUTPATIENT)
Dept: HEMATOLOGY ONCOLOGY | Facility: MEDICAL CENTER | Age: 78
End: 2018-04-03
Payer: MEDICARE

## 2018-04-03 VITALS
HEART RATE: 106 BPM | SYSTOLIC BLOOD PRESSURE: 123 MMHG | WEIGHT: 164.46 LBS | DIASTOLIC BLOOD PRESSURE: 71 MMHG | RESPIRATION RATE: 18 BRPM | TEMPERATURE: 97 F | OXYGEN SATURATION: 100 % | BODY MASS INDEX: 27.4 KG/M2 | HEIGHT: 65 IN

## 2018-04-03 VITALS
WEIGHT: 164.13 LBS | RESPIRATION RATE: 18 BRPM | SYSTOLIC BLOOD PRESSURE: 123 MMHG | HEART RATE: 106 BPM | DIASTOLIC BLOOD PRESSURE: 71 MMHG | TEMPERATURE: 97 F | OXYGEN SATURATION: 100 % | BODY MASS INDEX: 27.02 KG/M2

## 2018-04-03 VITALS
DIASTOLIC BLOOD PRESSURE: 71 MMHG | BODY MASS INDEX: 27.4 KG/M2 | RESPIRATION RATE: 18 BRPM | HEART RATE: 106 BPM | OXYGEN SATURATION: 100 % | SYSTOLIC BLOOD PRESSURE: 123 MMHG | WEIGHT: 164.46 LBS | TEMPERATURE: 97 F | HEIGHT: 65 IN

## 2018-04-03 DIAGNOSIS — C20 RECTAL CANCER (HCC): ICD-10-CM

## 2018-04-03 DIAGNOSIS — C78.7 SECONDARY MALIGNANT NEOPLASM OF LIVER (HCC): ICD-10-CM

## 2018-04-03 DIAGNOSIS — R94.4 DECREASED GFR: ICD-10-CM

## 2018-04-03 LAB
ALBUMIN SERPL BCP-MCNC: 3.5 G/DL (ref 3.2–4.9)
ALBUMIN/GLOB SERPL: 1.3 G/DL
ALP SERPL-CCNC: 209 U/L (ref 30–99)
ALT SERPL-CCNC: 39 U/L (ref 2–50)
ANION GAP SERPL CALC-SCNC: 8 MMOL/L (ref 0–11.9)
AST SERPL-CCNC: 45 U/L (ref 12–45)
BASOPHILS # BLD AUTO: 1.1 % (ref 0–1.8)
BASOPHILS # BLD: 0.05 K/UL (ref 0–0.12)
BILIRUB SERPL-MCNC: 0.8 MG/DL (ref 0.1–1.5)
BUN BLD-MCNC: 22 MG/DL (ref 8–22)
BUN SERPL-MCNC: 17 MG/DL (ref 8–22)
CALCIUM SERPL-MCNC: 9.4 MG/DL (ref 8.4–10.2)
CEA SERPL-MCNC: 2.3 NG/ML (ref 0–3)
CHLORIDE BLD-SCNC: 109 MMOL/L (ref 96–112)
CHLORIDE SERPL-SCNC: 109 MMOL/L (ref 96–112)
CO2 BLD-SCNC: 21 MMOL/L (ref 20–33)
CO2 SERPL-SCNC: 21 MMOL/L (ref 20–33)
CREAT BLD-MCNC: 1.2 MG/DL (ref 0.5–1.4)
CREAT SERPL-MCNC: 1.16 MG/DL (ref 0.5–1.4)
EOSINOPHIL # BLD AUTO: 0.16 K/UL (ref 0–0.51)
EOSINOPHIL NFR BLD: 3.4 % (ref 0–6.9)
ERYTHROCYTE [DISTWIDTH] IN BLOOD BY AUTOMATED COUNT: 56.3 FL (ref 35.9–50)
GLOBULIN SER CALC-MCNC: 2.7 G/DL (ref 1.9–3.5)
GLUCOSE BLD-MCNC: 121 MG/DL (ref 65–99)
GLUCOSE SERPL-MCNC: 114 MG/DL (ref 65–99)
HCT VFR BLD AUTO: 39 % (ref 37–47)
HGB BLD-MCNC: 12.2 G/DL (ref 12–16)
IMM GRANULOCYTES # BLD AUTO: 0.01 K/UL (ref 0–0.11)
IMM GRANULOCYTES NFR BLD AUTO: 0.2 % (ref 0–0.9)
LYMPHOCYTES # BLD AUTO: 1.16 K/UL (ref 1–4.8)
LYMPHOCYTES NFR BLD: 24.7 % (ref 22–41)
MCH RBC QN AUTO: 26.9 PG (ref 27–33)
MCHC RBC AUTO-ENTMCNC: 31.3 G/DL (ref 33.6–35)
MCV RBC AUTO: 86.1 FL (ref 81.4–97.8)
MONOCYTES # BLD AUTO: 0.53 K/UL (ref 0–0.85)
MONOCYTES NFR BLD AUTO: 11.3 % (ref 0–13.4)
NEUTROPHILS # BLD AUTO: 2.79 K/UL (ref 2–7.15)
NEUTROPHILS NFR BLD: 59.3 % (ref 44–72)
NRBC # BLD AUTO: 0 K/UL
NRBC BLD-RTO: 0 /100 WBC
PLATELET # BLD AUTO: 202 K/UL (ref 164–446)
PMV BLD AUTO: 10.3 FL (ref 9–12.9)
POTASSIUM BLD-SCNC: 3.7 MMOL/L (ref 3.6–5.5)
POTASSIUM SERPL-SCNC: 3.6 MMOL/L (ref 3.6–5.5)
PROT SERPL-MCNC: 6.2 G/DL (ref 6–8.2)
RBC # BLD AUTO: 4.53 M/UL (ref 4.2–5.4)
SODIUM BLD-SCNC: 143 MMOL/L (ref 135–145)
SODIUM SERPL-SCNC: 138 MMOL/L (ref 135–145)
WBC # BLD AUTO: 4.7 K/UL (ref 4.8–10.8)

## 2018-04-03 PROCEDURE — 700111 HCHG RX REV CODE 636 W/ 250 OVERRIDE (IP)

## 2018-04-03 PROCEDURE — 84132 ASSAY OF SERUM POTASSIUM: CPT

## 2018-04-03 PROCEDURE — 36591 DRAW BLOOD OFF VENOUS DEVICE: CPT

## 2018-04-03 PROCEDURE — 700111 HCHG RX REV CODE 636 W/ 250 OVERRIDE (IP): Performed by: INTERNAL MEDICINE

## 2018-04-03 PROCEDURE — 82374 ASSAY BLOOD CARBON DIOXIDE: CPT

## 2018-04-03 PROCEDURE — 96374 THER/PROPH/DIAG INJ IV PUSH: CPT | Mod: XU

## 2018-04-03 PROCEDURE — 82565 ASSAY OF CREATININE: CPT

## 2018-04-03 PROCEDURE — 99214 OFFICE O/P EST MOD 30 MIN: CPT | Performed by: INTERNAL MEDICINE

## 2018-04-03 PROCEDURE — 84520 ASSAY OF UREA NITROGEN: CPT

## 2018-04-03 PROCEDURE — 82378 CARCINOEMBRYONIC ANTIGEN: CPT

## 2018-04-03 PROCEDURE — 82435 ASSAY OF BLOOD CHLORIDE: CPT

## 2018-04-03 PROCEDURE — A4212 NON CORING NEEDLE OR STYLET: HCPCS

## 2018-04-03 PROCEDURE — 85025 COMPLETE CBC W/AUTO DIFF WBC: CPT

## 2018-04-03 PROCEDURE — G0498 CHEMO EXTEND IV INFUS W/PUMP: HCPCS

## 2018-04-03 PROCEDURE — 82947 ASSAY GLUCOSE BLOOD QUANT: CPT

## 2018-04-03 PROCEDURE — 84295 ASSAY OF SERUM SODIUM: CPT

## 2018-04-03 PROCEDURE — 80053 COMPREHEN METABOLIC PANEL: CPT

## 2018-04-03 RX ORDER — LOPERAMIDE HYDROCHLORIDE 2 MG/1
2 CAPSULE ORAL 4 TIMES DAILY PRN
Status: ON HOLD | COMMUNITY
End: 2020-05-28

## 2018-04-03 RX ORDER — NITROGLYCERIN 0.4 MG/1
0.4 TABLET SUBLINGUAL PRN
Qty: 25 TAB | Refills: 0 | Status: ON HOLD | OUTPATIENT
Start: 2018-04-03 | End: 2018-11-15

## 2018-04-03 RX ORDER — AMLODIPINE BESYLATE 5 MG/1
TABLET ORAL
Qty: 30 TAB | Refills: 0 | Status: SHIPPED | OUTPATIENT
Start: 2018-04-03 | End: 2018-06-12

## 2018-04-03 RX ORDER — DEXAMETHASONE SODIUM PHOSPHATE 4 MG/ML
8 INJECTION, SOLUTION INTRA-ARTICULAR; INTRALESIONAL; INTRAMUSCULAR; INTRAVENOUS; SOFT TISSUE ONCE
Status: COMPLETED | OUTPATIENT
Start: 2018-04-03 | End: 2018-04-03

## 2018-04-03 RX ADMIN — HEPARIN 500 UNITS: 100 SYRINGE at 08:52

## 2018-04-03 RX ADMIN — DEXAMETHASONE SODIUM PHOSPHATE 8 MG: 4 INJECTION, SOLUTION INTRAMUSCULAR; INTRAVENOUS at 11:03

## 2018-04-03 RX ADMIN — FLUOROURACIL 3550 MG: 50 INJECTION, SOLUTION INTRAVENOUS at 11:51

## 2018-04-03 ASSESSMENT — PAIN SCALES - GENERAL
PAINLEVEL: NO PAIN

## 2018-04-03 NOTE — TELEPHONE ENCOUNTER
Per patient's request I cancelled the Amlodopine 5m tab prescription that was e prescribed to hospitals pharmacy in [place] and called it in the [facility] pharmacy in [facility].   Called in to Asha cruz pharmacist at [facility].

## 2018-04-03 NOTE — PROGRESS NOTES
Chemotherapy Verification - PRIMARY RN      Height = 166 cm  Weight = 74.6 kg  BSA = 1.85 m^2       Medication: fluorouracil  Dose: 1920 mg/m^2  Calculated Dose: 3552 mg                             (In mg/m2, AUC, mg/kg)     I confirm this process was performed independently with the BSA and all final chemotherapy dosing calculations congruent.  Any discrepancies of 5% or greater have been addressed with the chemotherapy pharmacist. The resolution of the discrepancy has been documented in the EPIC progress notes.

## 2018-04-03 NOTE — PROGRESS NOTES
Paitient here for CADD home pump connect. Port previously accessed; brisk blood return noted. Labs drawn earlier today and reviewed today. Patient connected to 5FU CADD pump running at 1.5 ml/hr. Total volume with overfill = 72.6 ml. Minimum to infuse = 71 ml. Next appointment scheduled. Discharged to self care; no apparent distress noted.

## 2018-04-03 NOTE — PROGRESS NOTES
Date of visit: 4/3/2018  9:27 AM      Chief Complaint- Metastatic rectal cancer to liver, for follow-up.      Identification/Prior relevant history:   Originally diagnosed with a rectal carcinoma in 2012 secondary to bleeding. s/pt laparoscopic resection with primary anastomosis and final pathology was consistent with well-differentiated adenocarcinoma with 0/30 nodes. She was staged pT2 N0. 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      8/2015 she was diagnosed with metastatic disease to the liver with a 2.4 cm mass as well as small pulmonary nodules and thyroid nodules. She underwent an ablative therapy to the liver. She was then started on Xelox. She had  allergic reaction to oxaliplatin and was continued on Xeloda. This eventually led to increased diarrhea and her regimen was changed to single agent 5-FU. She has been tolerating the therapy well with good responses on imaging.      3/2017- CT of the chest abdomen pelvis which has been stable and no evidence of disease. CT of the wrist was consistent with tenosynovitis and degenerative changes.     5/17-established care with me   EGD and colonoscopy  did not reveal any local recurrence. She had some gastritis.   - CEA has trended up to 13  -PET scan isolated hepatic lesion. Patchy basilar pulmonary opacities. Seen by Dr. West referred for Y90 embolization  6/17- Y90 Theraspheres.  Continued on 5-FU.  -CEA trending down/normalized     -CT abdomen and-3/9/18-Oval-shaped low attenuation lesion in segment 8 of the liver is again identified which appears smaller than the prior exam. No enhancement within or around this lesion is appreciated. Findings are consistent with posttreatment changes in this portion of the liver after Y 90 procedure.    Interim history- she presumed that the 5-FU infusion after a short break. She again had chest  discomfort for a few hours , similar to when she had it last year. She did mention it to the cardiologist. She was also seen by Dr. West, who recommended no active surgical intervention. Plan to obtain PET scan around 6 months from now    Past Medical History:      Past Medical History:   Diagnosis Date   • Arrhythmia    • Blood clotting disorder (CMS-HCC) 08/2015    bilat PE    • Blood transfusion 1957   • Cancer (CMS-HCC) 09/2012    rectal cancer,  Liver CA-2015   • CATARACT     removed b/l   • Chemotherapy-induced neutropenia (CMS-HCC) 9/28/2016   • Chickenpox    • Colon cancer (CMS-HCC)    • Dental disorder     dentures UPPERS AND LOWERS   • Elevated alkaline phosphatase level 11/15/2017   • Fall    • [place] Islander measles    • Hemorrhagic disorder (CMS-HCC)     on Xarelto    • Hypercholesteremia    • Hypertension     no meds currently    • Ileostomy in place (CMS-HCC)    • Indigestion    • Influenza    • Lightheadedness 9/17/2015   • Mumps    • Obstruction     ileostomy   • Other specified symptom associated with female genital organs     HYSTERECTOMY 1993   • Pneumonia 05/2017    tx'd with antibx   • Pulmonary embolism (CMS-HCC)    • 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)    • Renal insufficiency 3/14/2016   • Routine general medical examination at a health care facility 3/14/2016    3/14/16   • Seasonal allergies    • Tonsillitis        Past surgical history:       Past Surgical History:   Procedure Laterality Date   • COLONOSCOPY WITH BIOPSY  8/23/2015    Procedure: COLONOSCOPY WITH BIOPSY;  Surgeon: Wilbur Glasgow M.D.;  Location: ENDOSCOPY [facility];  Service:    • HEPATIC ABLATION LAPAROSCOPIC  7/6/2015    Procedure: HEPATIC ABLATION LAPAROSCOPIC.;  Surgeon: Kit West M.D.;  Location: SURGERY [facility];  Service:    • CATH PLACEMENT Left 7/6/2015    Procedure: CATH PLACEMENT CEPHALIC POWERPORT;  Surgeon: Kit West M.D.;  Location: SURGERY [facility]  ORS;  Service:    • FISTULA IN ANO REPAIR  1/28/2015    Performed by Kolton Montgomery M.D. at SURGERY [facility]   • PERINEAL PROCEDURE  1/28/2015    Performed by Kolton Montgomery M.D. at [facility]   • FISTULA IN ANO REPAIR  10/15/2014    Performed by Kolton Montgomery M.D. at SURGERY [facility]   • PERINEAL PROCEDURE  10/15/2014    Performed by Kolton Montgomery M.D. at [facility]   • IRRIGATION & DEBRIDEMENT GENERAL  2/19/2014    Performed by Kolton Montgomery M.D. at [facility]   • FLAP GRAFT  2/19/2014    Performed by Kolton Montgomery M.D. at [facility]   • PERINEAL PROCEDURE  12/18/2013    Performed by Kolton Montgomery M.D. at [facility]   • MYOCUTANEOUS FLAP  12/18/2013    Performed by Kolton Montgomery M.D. at SURGERY [facility]   • IRRIGATION & DEBRIDEMENT GENERAL  10/23/2013    Performed by Kolton Montgomery M.D. at [facility]   • FISTULA IN ANO REPAIR  9/4/2013    Performed by Kolton Montgomery M.D. at SURGERY [facility]   • FLAP ROTATION  9/4/2013    Performed by Kolton Montgomery M.D. at [facility]   • RECOVERY  8/26/2013    Performed by Cath-Recovery Surgery at SURGERY SAME DAY [facility]   • PROCTOSCOPY  7/17/2013    Performed by Kolton Montgomery M.D. at [facility]   • BIOPSY GENERAL  7/17/2013    Performed by Kolton Montgomery M.D. at [facility]   • SIGMOIDOSCOPY  2/27/2013    Performed by Kolton Montgomery M.D. at SURGERY [facility]   • FISTULA IN ANO REPAIR  2/27/2013    Performed by Kolton Montgomery M.D. at [facility]   • LAPAROSCOPY  10/8/2012    Performed by Kolton Montgomery M.D. at [facility]   • ILEO LOOP DIVERSION  10/8/2012    Performed by Kolton Montgomery M.D. at [facility]   • COLECTOMY  9/26/2012    Performed by Kolton Montgomery M.D. at SURGERY [facility] ORS   • COLONOSCOPY FLEX W/ENDO US  9/20/2012    Performed by Harshil Bolton M.D. at SURGERY [facility]S   •  OTHER  2009    left eye torn retina repair   • CATARACT EXTRACTION WITH IOL  2004    bilateral   • ABDOMINAL HYSTERECTOMY TOTAL  1983   • CYST EXCISION  1972    ovarian   • COLON RESECTION     • EYE SURGERY      cataracts   • HYSTERECTOMY LAPAROSCOPY     • PB REMV 2ND CATARACT,CORN-SCLER SECTN     • TONSILLECTOMY         Allergies:       Codeine; Oxaliplatin; Pcn [penicillins]; Sulfa drugs; and Tape    Medications:         Current Outpatient Prescriptions   Medication Sig Dispense Refill   • raNITidine (ZANTAC) 150 MG Tab TAKE 1 TABLET 2 TIMES A DAY 60 Tab 3   • rivaroxaban (XARELTO) 10 MG Tab tablet Take 1 Tab by mouth every day. 30 Tab 6   • lidocaine-prilocaine (EMLA) 2.5-2.5 % Cream APPLY A THICK FILM OVER THE PORT ACCESS SITE PRIOR TO ACCESS 30 g 2   • Tiotropium Bromide-Olodaterol (STIOLTO RESPIMAT) 2.5-2.5 MCG/ACT Aero Soln Take 2 Puffs by mouth every day. 1 Inhaler 11   • ondansetron (ZOFRAN) 4 MG Tab tablet      • cyanocobalamin (VITAMIN B-12) 500 MCG Tab Take 500 mcg by mouth every day.     • Multiple Vitamins-Minerals (CENTRUM SILVER PO) Take  by mouth.     • Cholecalciferol (VITAMIN D3) 400 UNITS CAPS Take 1 Cap by mouth every day.     • loratadine (CLARITIN) 10 MG TABS Take 10 mg by mouth every day. Indications: Hayfever     • oseltamivir (TAMIFLU) 30 MG Cap Take 75 mg by mouth every 12 hours.     • oseltamivir (TAMIFLU) 75 MG Cap Take 75 mg by mouth 2 times a day.     • benzonatate (TESSALON) 100 MG Cap Take 1 Cap by mouth 3 times a day as needed for Cough. 60 Cap 0   • oxycodone immediate-release (ROXICODONE) 5 MG Tab Take 1 Tab by mouth every four hours as needed for Severe Pain. 30 Tab 0   • albuterol (PROAIR HFA) 108 (90 Base) MCG/ACT Aero Soln inhalation aerosol Inhale 2 Puffs by mouth every four hours as needed for Shortness of Breath (wheezing). 1 Inhaler 11   • furosemide (LASIX) 40 MG Tab Take 1 Tab by mouth every day. 30 Tab 11   • XARELTO 20 MG Tab tablet TAKE 1 TABLET WITH DINNER. 30 Tab 6     • potassium chloride ER (KLOR-CON) 10 MEQ tablet Take 1 Tab by mouth every day. 30 Tab 3   • loperamide (IMODIUM) 1 MG/5ML Liquid Take 2 mg by mouth 4 times a day as needed for Diarrhea.       Current Facility-Administered Medications   Medication Dose Route Frequency Provider Last Rate Last Dose   • heparin pf injection 500 Units  500 Units Intracatheter PRN Eric Hardy M.D.   500 Units at 03/09/18 1542   • heparin pf injection 500 Units  500 Units Intracatheter PRN ARTURO Krishnan   500 Units at 08/22/17 0856         Social History:     Social History     Social History   • Marital status:      Spouse name: N/A   • Number of children: N/A   • Years of education: N/A     Occupational History   • Not on file.     Social History Main Topics   • Smoking status: Never Smoker   • Smokeless tobacco: Never Used   • Alcohol use No      Comment: rare   • Drug use: No   • Sexual activity: No     Other Topics Concern   • Not on file     Social History Narrative   • No narrative on file       Family History:      Family History   Problem Relation Age of Onset   • Heart Disease Mother    • Heart Failure Mother    • Hypertension Mother    • Hyperlipidemia Mother    • Cancer Mother    • Cancer Maternal Aunt 60     breast ca   • Lung Disease Brother      COPD/Emphysema/Smoker   • Cancer Brother      prostate cancer   • Alcohol/Drug Brother      Alcohol   • Kidney Disease Father      renal failure       Review of Systems:  All other review of systems are negative except what was mentioned above in the HPI.    Constitutional: Negative for fever, chills, weight loss and malaise/fatigue.    HEENT: No new auditory or visual complaints. No sore throat and neck pain.     Respiratory: Negative for cough, sputum production, shortness of breath and wheezing.    Cardiovascular: Negative for chest pain, palpitations, orthopnea and leg swelling. , Chest pain, resolved    Gastrointestinal: Negative for heartburn, nausea,  vomiting and abdominal pain.    Genitourinary: Negative for dysuria, hematuria    Musculoskeletal: No new arthralgias or myalgias   Skin: Negative for rash and itching.    Neurological: Negative for focal weakness and headaches.    Endo/Heme/Allergies: No abnormal bleed/bruise.    Psychiatric/Behavioral: No new depression/anxiety.    Physical Exam:  Vitals: /71   Pulse (!) 106   Temp 36.1 °C (97 °F)   Resp 18   Wt 74.5 kg (164 lb 2.1 oz)   SpO2 100%   BMI 27.02 kg/m²     General: Not in acute distress, alert and oriented x 3  HEENT: No pallor, icterus. Oropharynx clear.   Neck: Supple, no palpable masses.  Lymph nodes: No palpable cervical, supraclavicular, axillary or inguinal lymphadenopathy.    CVS: regular rate and rhythm, no rubs or gallops  RESP: Clear to auscultate bilaterally, no wheezing or crackles.   ABD: Soft, non tender, non distended, positive bowel sounds, no palpable organomegaly  EXT: No edema or cyanosis  CNS: Alert and oriented x3, No focal deficits.  Skin- No rash      Labs:   Outpatient Infusion Services on 04/03/2018   Component Date Value Ref Range Status   • WBC 04/03/2018 4.7* 4.8 - 10.8 K/uL Final   • RBC 04/03/2018 4.53  4.20 - 5.40 M/uL Final   • Hemoglobin 04/03/2018 12.2  12.0 - 16.0 g/dL Final   • Hematocrit 04/03/2018 39.0  37.0 - 47.0 % Final   • MCV 04/03/2018 86.1  81.4 - 97.8 fL Final   • MCH 04/03/2018 26.9* 27.0 - 33.0 pg Final   • MCHC 04/03/2018 31.3* 33.6 - 35.0 g/dL Final   • RDW 04/03/2018 56.3* 35.9 - 50.0 fL Final   • Platelet Count 04/03/2018 202  164 - 446 K/uL Final   • MPV 04/03/2018 10.3  9.0 - 12.9 fL Final   • Neutrophils-Polys 04/03/2018 59.30  44.00 - 72.00 % Final   • Lymphocytes 04/03/2018 24.70  22.00 - 41.00 % Final   • Monocytes 04/03/2018 11.30  0.00 - 13.40 % Final   • Eosinophils 04/03/2018 3.40  0.00 - 6.90 % Final   • Basophils 04/03/2018 1.10  0.00 - 1.80 % Final   • Immature Granulocytes 04/03/2018 0.20  0.00 - 0.90 % Final   • Nucleated  RBC 04/03/2018 0.00  /100 WBC Final   • Neutrophils (Absolute) 04/03/2018 2.79  2.00 - 7.15 K/uL Final   • Lymphs (Absolute) 04/03/2018 1.16  1.00 - 4.80 K/uL Final   • Monos (Absolute) 04/03/2018 0.53  0.00 - 0.85 K/uL Final   • Eos (Absolute) 04/03/2018 0.16  0.00 - 0.51 K/uL Final   • Baso (Absolute) 04/03/2018 0.05  0.00 - 0.12 K/uL Final   • Immature Granulocytes (abs) 04/03/2018 0.01  0.00 - 0.11 K/uL Final   • NRBC (Absolute) 04/03/2018 0.00  K/uL Final             Assessment and Plan:  .Metastatic rectal cancer, KRAS positive: She had a rather indolent course, but had isolated relapse in the liver. She is status post Y90 theraspheres with normalization of her CEA level. Posttreatment images are consistent with treated metastatic lesion. Curtisesumed  the 5-FU infusion after a short break and appears to have had coronary vasospasm-like symptoms. She had the same problem in 2016 and was referred to cardiology at that point. She had poor tolerance to Xeloda, which is lower incidence of coronary vasospasm. She feels like she can try more 5-FU infusion and see what happens as her chest pain has subsequently resolved in the prior case.    #2. Coronary vasospasm? Instructed her to go to the emergency room to see whether her EKG does show ST changes. I will start her on calcium channel blocker at low dose. I also given her some prescription for nitroglycerin when necessary. I will refer her to cardiac oncology for further evaluation.    Exertional dyspnea-CT of the chest, grossly unremarkable. She feels better with inhalers. She will follow up with pulmonary. Agree with exercise recommendation.     3 History of pulmonary embolus: He was diagnosed in 8/2015 and was initially treated with Coumadin then changed to Xarelto. She had completed 6 months of therapy but was continued on anticoagulation secondary to underlying malignancy.. She did have significant clot burden and pulmonary infarction at the time of  presentation.. She recently had some bleeding around the stoma. It would be okay for her to reduce the Eliquis to 2.5 mg twice a day.     4 Anal repair and reconstruction: Patient was seen by Dr. Montgomery and has had multiple surgeries to this area. Further surgery has been deferred. She continues to follow-up with Dr. Montgomery periodically    She agreed and verbalized  agreement and understanding with the current plan.  I answered all questions and concerns at this time         Please note that this dictation was created using voice recognition software. I have made every reasonable attempt to correct obvious errors, but I expect that there are errors of grammar and possibly content that I did not discover before finalizing the note.      SIGNATURES:  Eric Hardy    CC:  Manan Quiñonez M.D.  No ref. provider found

## 2018-04-03 NOTE — PROGRESS NOTES
"Pharmacy Chemotherapy Verification Note:    Patient Name: Karlene Walters      Dx: Colon CA        Cycle 56   Previous treatment: C55 on 3/20/18    Protocol: 5-FU+Leucovorin       IV over 2 hours on Day 1, followed by   IVP on Day 1, followed by    - Dose reduced by 20% to 320 mg/m² starting with Cycle 18 ([place] #11) on 7/5/16 due to neutropenia    10/31/17: delete Leucovorin and 5-FU push d/t neutropenia per Dr. Hardy   Fluorouracil 1200 mg/m²  IV continuous infusion daily on Days 1 and 2 (2400 mg/m² IV over 46-48 hours) -    - Infusion Dose reduced by 20% starting with Cycle 18 ([place] #11) on 7/5/16 due to neutropenia   -20% reduction (1920 mg/m2) to be continued starting C28 per C Alsop APRN   14 day cycles for 12 cycles (adjuvant) or until disease progression or unacceptable toxicity  NCCN Guidelines for Colon Cancer. V.2.2015.  Jay T, et al. Eur J Cancer.1999;35(9):1343-7.      Allergies:  Codeine; Oxaliplatin; Pcn; Sulfa drugs; and Tape     /71   Pulse (!) 106   Temp 36.1 °C (97 °F)   Resp 18   Ht 1.66 m (5' 5.35\")   Wt 74.6 kg (164 lb 7.4 oz)   SpO2 100%   BMI 27.07 kg/m²    Body surface area is 1.85 meters squared.     ANC~ 2790 Plt = 202k   Hgb = 12.2     SCr = 1.16mg/dL CrCl ~ 48mL/min   LFT's = 45/39/209 TBili = 0.8        Fluorouracil (5-FU) 1920 mg/m² x 1.85 m² = 3552 mg              <5% difference, OK to treat with final written dose = 3550 mg CIVI over 46 hours                 Tiffanie L Charline, PharmD    "

## 2018-04-03 NOTE — PROGRESS NOTES
Chemotherapy Verification - SECONDARY RN       Height = 166 cm  Weight = 74.6 kg  BSA = 1.85 m2       Medication: 5FU home infusion  Dose: 1920 mg/m2 dr  Calculated Dose: 3561 mg                             (In mg/m2, AUC, mg/kg)       I confirm that this process was performed independently.

## 2018-04-03 NOTE — PROGRESS NOTES
Pt arrived on foot to hospitals for PORT access and lab draw prior to next cycle of 5FU infusion.  Pt w/ no complaints at this time.  PORT accessed using sterile technique, brisk blood return noted,  labs drawn, and PORT heparinized.  PORT left accessed as pt is scheduled to come back to hospitals later this morning for 5FU pump connect.  Pt left on foot in NAD.

## 2018-04-05 ENCOUNTER — OUTPATIENT INFUSION SERVICES (OUTPATIENT)
Dept: ONCOLOGY | Facility: MEDICAL CENTER | Age: 78
End: 2018-04-05
Attending: INTERNAL MEDICINE
Payer: MEDICARE

## 2018-04-05 VITALS
SYSTOLIC BLOOD PRESSURE: 126 MMHG | DIASTOLIC BLOOD PRESSURE: 64 MMHG | OXYGEN SATURATION: 98 % | HEART RATE: 87 BPM | WEIGHT: 165.34 LBS | HEIGHT: 65 IN | BODY MASS INDEX: 27.55 KG/M2 | RESPIRATION RATE: 16 BRPM | TEMPERATURE: 97.9 F

## 2018-04-05 DIAGNOSIS — C20 RECTAL CANCER (HCC): ICD-10-CM

## 2018-04-05 DIAGNOSIS — R94.4 DECREASED GFR: ICD-10-CM

## 2018-04-05 PROCEDURE — 700111 HCHG RX REV CODE 636 W/ 250 OVERRIDE (IP)

## 2018-04-05 PROCEDURE — 96523 IRRIG DRUG DELIVERY DEVICE: CPT

## 2018-04-05 RX ADMIN — HEPARIN 500 UNITS: 100 SYRINGE at 15:33

## 2018-04-05 ASSESSMENT — PAIN SCALES - GENERAL: PAINLEVEL: NO PAIN

## 2018-04-05 NOTE — PROGRESS NOTES
Patient arrived to [facility] for 5FU CADD pump de-access.  Pump stopped with 0ml remaining in reservoir.  Disconnected pump, cleaned pump and placed in pharmacy drawer.  Port flushed per protocol with brisk blood return noted, heparinized, de-accessed and gauze dressing applied.  Confirmed pt's next appt. Pt discharged home in [facility].

## 2018-04-16 NOTE — PROGRESS NOTES
"Pharmacy Chemotherapy Verification Note:    Patient Name: Karlene Walters      Dx: Colon CA        Protocol: 5-FU + Leucovorin  *Dosing Reference*       IV over 2 hours on Day 1, followed by    IVP on Day 1, followed by         -- 10/31/17: delete Leucovorin and 5-FU push d/t neutropenia per Dr. Hardy   Fluorouracil   IV continuous infusion daily on Days 1 and 2 ( IV over 46-48 hours)        -- 20% reduction (1920 mg/m2) to be continued starting C28 per C Alsop APRN   14 day cycles for 12 cycles (adjuvant) or until disease progression or unacceptable toxicity   NCCN Guidelines for Colon Cancer. V.2.2015.  Jay T, et al. Eur J Cancer.1999;35(9):1343-7.      Allergies:  Codeine; Oxaliplatin; Pcn; Sulfa drugs; and Tape       /71   Pulse (!) 109   Temp 36.1 °C (97 °F)   Resp 18   Ht 1.66 m (5' 5.35\")   Wt 75.3 kg (166 lb 0.1 oz)   SpO2 99%   BMI 27.33 kg/m²  Body surface area is 1.86 meters squared.    ANC~ 2240 Plt = 214k   Hgb = 11.9       Drug Order   (Drug name, dose, route, IV Fluid & volume, frequency, number of doses) Cycle 57 (C49 in [place])  Previous treatment: C56 = 4/3/18     Medication = Fluorouracil (5-FU)   Base Dose = 1920 mg/m²  Calc Dose:Base Dose x 1.86m² = 3571.2mg  Final Dose = 3570mg   Route = CIVI  Fluid & Volume = CADD pump mL (+ 3 mL overfill = 74.4mL)  Admin Duration = Over 46hours  Rate = 1.6mL/hour   Via CADD pump for home infusion        <5% difference, ok to treat with final dose     By my signature below, I confirm this process was performed independently with the BSA and all final chemotherapy dosing calculations congruent. I have reviewed the above chemotherapy order and that my calculation of the final dose and BSA (when applicable) corroborate those calculations of the  pharmacist.       Nakia Arroyo, PharmD    "

## 2018-04-16 NOTE — PROGRESS NOTES
"Pharmacy Chemotherapy Verification Note:    Patient Name: Karlene Walters      Dx: Colon CA        Cycle 57   Previous treatment: C56 on 4/3/18    Protocol: 5-FU+Leucovorin       IV over 2 hours on Day 1, followed by   IVP on Day 1, followed by    - Dose reduced by 20% to 320 mg/m² starting with Cycle 18 ([place] #11) on 7/5/16 due to neutropenia    10/31/17: delete Leucovorin and 5-FU push d/t neutropenia per Dr. Hardy   Fluorouracil 1200 mg/m²  IV continuous infusion daily on Days 1 and 2 (2400 mg/m² IV over 46-48 hours) -    - Infusion Dose reduced by 20% starting with Cycle 18 ([place] #11) on 7/5/16 due to neutropenia   -20% reduction (1920 mg/m2) to be continued starting C28 per C Alsop APRN   14 day cycles for 12 cycles (adjuvant) or until disease progression or unacceptable toxicity  NCCN Guidelines for Colon Cancer. V.2.2015.  Jay T, et al. Eur J Cancer.1999;35(9):1343-7.      Allergies:  Codeine; Oxaliplatin; Pcn; Sulfa drugs; and Tape     /71   Pulse (!) 109   Temp 36.1 °C (97 °F)   Resp 18   Ht 1.66 m (5' 5.35\")   Wt 75.3 kg (166 lb 0.1 oz)   SpO2 99%   BMI 27.33 kg/m²    Body surface area is 1.86 meters squared.     ANC~ 2240 Plt = 214k   Hgb = 11.9    4/3/18  SCr = 1.16 mg/dL CrCl ~ 47 mL/min   LFT's = WNL except AP = 209 TBili = 0.8      Fluorouracil (5-FU) 1920 mg/m² x 1.86 m² = 3571 mg              <5% difference, OK to treat with final dose = 3570 mg CIVI over 46 hours   Via home infusion pump at 1.6 ml/hr                 Norris Mitchell, PharmD    "

## 2018-04-17 ENCOUNTER — OUTPATIENT INFUSION SERVICES (OUTPATIENT)
Dept: ONCOLOGY | Facility: MEDICAL CENTER | Age: 78
End: 2018-04-17
Attending: INTERNAL MEDICINE
Payer: MEDICARE

## 2018-04-17 VITALS
SYSTOLIC BLOOD PRESSURE: 130 MMHG | TEMPERATURE: 97 F | WEIGHT: 166.01 LBS | RESPIRATION RATE: 18 BRPM | BODY MASS INDEX: 27.66 KG/M2 | HEIGHT: 65 IN | HEART RATE: 77 BPM | OXYGEN SATURATION: 99 % | DIASTOLIC BLOOD PRESSURE: 66 MMHG

## 2018-04-17 VITALS
WEIGHT: 166.01 LBS | SYSTOLIC BLOOD PRESSURE: 137 MMHG | RESPIRATION RATE: 18 BRPM | BODY MASS INDEX: 27.66 KG/M2 | HEIGHT: 65 IN | OXYGEN SATURATION: 99 % | DIASTOLIC BLOOD PRESSURE: 71 MMHG | TEMPERATURE: 97 F | HEART RATE: 109 BPM

## 2018-04-17 DIAGNOSIS — R94.4 DECREASED GFR: ICD-10-CM

## 2018-04-17 DIAGNOSIS — C20 RECTAL CANCER (HCC): ICD-10-CM

## 2018-04-17 LAB
BASOPHILS # BLD AUTO: 1.2 % (ref 0–1.8)
BASOPHILS # BLD: 0.05 K/UL (ref 0–0.12)
EOSINOPHIL # BLD AUTO: 0.15 K/UL (ref 0–0.51)
EOSINOPHIL NFR BLD: 3.6 % (ref 0–6.9)
ERYTHROCYTE [DISTWIDTH] IN BLOOD BY AUTOMATED COUNT: 56.9 FL (ref 35.9–50)
HCT VFR BLD AUTO: 38.3 % (ref 37–47)
HGB BLD-MCNC: 11.9 G/DL (ref 12–16)
IMM GRANULOCYTES # BLD AUTO: 0.02 K/UL (ref 0–0.11)
IMM GRANULOCYTES NFR BLD AUTO: 0.5 % (ref 0–0.9)
LYMPHOCYTES # BLD AUTO: 1.25 K/UL (ref 1–4.8)
LYMPHOCYTES NFR BLD: 30 % (ref 22–41)
MCH RBC QN AUTO: 26.3 PG (ref 27–33)
MCHC RBC AUTO-ENTMCNC: 31.1 G/DL (ref 33.6–35)
MCV RBC AUTO: 84.5 FL (ref 81.4–97.8)
MONOCYTES # BLD AUTO: 0.45 K/UL (ref 0–0.85)
MONOCYTES NFR BLD AUTO: 10.8 % (ref 0–13.4)
NEUTROPHILS # BLD AUTO: 2.24 K/UL (ref 2–7.15)
NEUTROPHILS NFR BLD: 53.9 % (ref 44–72)
NRBC # BLD AUTO: 0 K/UL
NRBC BLD-RTO: 0 /100 WBC
PLATELET # BLD AUTO: 214 K/UL (ref 164–446)
PMV BLD AUTO: 9.7 FL (ref 9–12.9)
RBC # BLD AUTO: 4.53 M/UL (ref 4.2–5.4)
WBC # BLD AUTO: 4.2 K/UL (ref 4.8–10.8)

## 2018-04-17 PROCEDURE — 96375 TX/PRO/DX INJ NEW DRUG ADDON: CPT

## 2018-04-17 PROCEDURE — 85025 COMPLETE CBC W/AUTO DIFF WBC: CPT

## 2018-04-17 PROCEDURE — A4212 NON CORING NEEDLE OR STYLET: HCPCS

## 2018-04-17 PROCEDURE — 96374 THER/PROPH/DIAG INJ IV PUSH: CPT

## 2018-04-17 PROCEDURE — 700111 HCHG RX REV CODE 636 W/ 250 OVERRIDE (IP)

## 2018-04-17 PROCEDURE — 700111 HCHG RX REV CODE 636 W/ 250 OVERRIDE (IP): Performed by: INTERNAL MEDICINE

## 2018-04-17 PROCEDURE — G0498 CHEMO EXTEND IV INFUS W/PUMP: HCPCS

## 2018-04-17 RX ORDER — DEXAMETHASONE SODIUM PHOSPHATE 4 MG/ML
8 INJECTION, SOLUTION INTRA-ARTICULAR; INTRALESIONAL; INTRAMUSCULAR; INTRAVENOUS; SOFT TISSUE ONCE
Status: COMPLETED | OUTPATIENT
Start: 2018-04-17 | End: 2018-04-17

## 2018-04-17 RX ADMIN — HEPARIN 500 UNITS: 100 SYRINGE at 08:37

## 2018-04-17 RX ADMIN — DEXAMETHASONE SODIUM PHOSPHATE 8 MG: 4 INJECTION, SOLUTION INTRAMUSCULAR; INTRAVENOUS at 09:52

## 2018-04-17 RX ADMIN — FLUOROURACIL 3570 MG: 50 INJECTION, SOLUTION INTRAVENOUS at 10:22

## 2018-04-17 ASSESSMENT — PAIN SCALES - GENERAL
PAINLEVEL: NO PAIN
PAINLEVEL: NO PAIN

## 2018-04-17 NOTE — PROGRESS NOTES
Pt arrived to [facility], ambulatory, for labs prior to chemotherapy. Pt voices no complaints. Port accessed in sterile manner, positive blood return noted. Labs drawn per order. Port flushed and heparin locked per policy, port left accessed. Pt left IS with no s/sx of distress. Follow up appointment confirmed for chemo later this morning.

## 2018-04-17 NOTE — PROGRESS NOTES
Chemotherapy Verification - PRIMARY RN      Height = 166 cm  Weight = 75.3 kg  BSA = 1.863 m2       Medication: Home infusion 5FU  Dose: 1920 mg/m2 over 46 hours  Calculated Dose: 3577.6 mg                             (In mg/m2, AUC, mg/kg)         I confirm this process was performed independently with the BSA and all final chemotherapy dosing calculations congruent.  Any discrepancies of 5% or greater have been addressed with the chemotherapy pharmacist. The resolution of the discrepancy has been documented in the EPIC progress notes.

## 2018-04-17 NOTE — PROGRESS NOTES
Chemotherapy Verification - SECONDARY RN       Height = 166 cm  Weight = 75.3 kg  BSA = 1.86 m^2       Medication: fluorouracil  Dose: 1920 mg/m^2  Calculated Dose: 3571.2 mg over 46 hours                             (In mg/m2, AUC, mg/kg)     I confirm that this process was performed independently.

## 2018-04-18 NOTE — PROGRESS NOTES
Returns for chemo.  Doing well overall, except for ongoing chest pain at end of 5FU infusion.  Getting set up with extended monitoring through cardiology.  Pre med given and pump set up.  DC to care of .

## 2018-04-19 ENCOUNTER — NON-PROVIDER VISIT (OUTPATIENT)
Dept: CARDIOLOGY | Facility: MEDICAL CENTER | Age: 78
End: 2018-04-19
Payer: MEDICARE

## 2018-04-19 ENCOUNTER — OUTPATIENT INFUSION SERVICES (OUTPATIENT)
Dept: ONCOLOGY | Facility: MEDICAL CENTER | Age: 78
End: 2018-04-19
Attending: INTERNAL MEDICINE
Payer: MEDICARE

## 2018-04-19 VITALS
DIASTOLIC BLOOD PRESSURE: 72 MMHG | WEIGHT: 169.75 LBS | RESPIRATION RATE: 18 BRPM | BODY MASS INDEX: 28.28 KG/M2 | OXYGEN SATURATION: 100 % | SYSTOLIC BLOOD PRESSURE: 147 MMHG | TEMPERATURE: 97.3 F | HEIGHT: 65 IN | HEART RATE: 86 BPM

## 2018-04-19 DIAGNOSIS — I47.10 SVT (SUPRAVENTRICULAR TACHYCARDIA): ICD-10-CM

## 2018-04-19 DIAGNOSIS — R94.4 DECREASED GFR: ICD-10-CM

## 2018-04-19 DIAGNOSIS — C20 RECTAL CANCER (HCC): ICD-10-CM

## 2018-04-19 PROCEDURE — 700111 HCHG RX REV CODE 636 W/ 250 OVERRIDE (IP): Performed by: INTERNAL MEDICINE

## 2018-04-19 PROCEDURE — 96523 IRRIG DRUG DELIVERY DEVICE: CPT

## 2018-04-19 RX ADMIN — HEPARIN 500 UNITS: 100 SYRINGE at 14:02

## 2018-04-19 ASSESSMENT — PAIN SCALES - GENERAL: PAINLEVEL: NO PAIN

## 2018-04-19 NOTE — PROGRESS NOTES
Patient arrived for CADD pump deaccess. Reports having chest pain again this am; did not go to ER to get EKG as cardiologist instructed.  Pt reports she is going to see cardiologist after this appt to get set up with home heart monitor.  Pt denies any symptoms of chest pain at this time.  CADD pump confirmed empty.  Port flushed per protocol, de-accessed.  Confirmed next appt; pt discharged in no acute distress.

## 2018-04-20 ENCOUNTER — TELEPHONE (OUTPATIENT)
Dept: CARDIOLOGY | Facility: MEDICAL CENTER | Age: 78
End: 2018-04-20

## 2018-04-21 DIAGNOSIS — R06.02 SHORTNESS OF BREATH: ICD-10-CM

## 2018-04-21 DIAGNOSIS — R60.9 EDEMA, UNSPECIFIED TYPE: ICD-10-CM

## 2018-04-23 RX ORDER — POTASSIUM CHLORIDE 750 MG/1
TABLET, FILM COATED, EXTENDED RELEASE ORAL
Qty: 30 TAB | Refills: 6 | Status: SHIPPED | OUTPATIENT
Start: 2018-04-23 | End: 2019-06-18 | Stop reason: SDUPTHER

## 2018-04-30 NOTE — PROGRESS NOTES
"Pharmacy Chemotherapy Verification Note:    Patient Name: Karlene Walters      Dx: Colon CA        Cycle 58   Previous treatment: C57 on 4/17/18    Protocol: 5-FU+Leucovorin       IV over 2 hours on Day 1, followed by   IVP on Day 1, followed by    - Dose reduced by 20% to 320 mg/m² starting with Cycle 18 ([place] #11) on 7/5/16 due to neutropenia    10/31/17: delete Leucovorin and 5-FU push d/t neutropenia per Dr. Hardy   Fluorouracil 1200 mg/m²  IV continuous infusion daily on Days 1 and 2 (2400 mg/m² IV over 46-48 hours) -    - Infusion Dose reduced by 20% starting with Cycle 18 ([place] #11) on 7/5/16 due to neutropenia   -20% reduction (1920 mg/m2) to be continued starting C28 per C Alsop APRN   14 day cycles for 12 cycles (adjuvant) or until disease progression or unacceptable toxicity  NCCN Guidelines for Colon Cancer. V.2.2015.  Jay T, et al. Eur J Cancer.1999;35(9):1343-7.      Allergies:  Codeine; Oxaliplatin; Pcn; Sulfa drugs; and Tape     /89   Pulse (!) 108   Temp 36.3 °C (97.4 °F)   Resp 18   Ht 1.66 m (5' 5.35\")   Wt 76 kg (167 lb 8.8 oz)   SpO2 100%   BMI 27.58 kg/m²    Body surface area is 1.87 meters squared.     ANC~ 2500 Plt = 217k   Hgb = 11.7     SCr = 1.06mg/dL CrCl ~ 52mL/min   LFT's = WNl, except AP = 204 TBili = 0.8        Fluorouracil (5-FU) 1920 mg/m² x  1.87m² = 3590mg              <5% difference, OK to treat with final dose = 3590mg    CIVI over 46 hours Via home infusion pump at 1.6ml/hr                 Nakia Arroyo, PharmD    "

## 2018-05-01 ENCOUNTER — OUTPATIENT INFUSION SERVICES (OUTPATIENT)
Dept: ONCOLOGY | Facility: MEDICAL CENTER | Age: 78
End: 2018-05-01
Attending: INTERNAL MEDICINE
Payer: MEDICARE

## 2018-05-01 ENCOUNTER — OFFICE VISIT (OUTPATIENT)
Dept: HEMATOLOGY ONCOLOGY | Facility: MEDICAL CENTER | Age: 78
End: 2018-05-01
Payer: MEDICARE

## 2018-05-01 ENCOUNTER — OUTPATIENT INFUSION SERVICES (OUTPATIENT)
Dept: ONCOLOGY | Facility: MEDICAL CENTER | Age: 78
End: 2018-05-01
Attending: COLON & RECTAL SURGERY
Payer: MEDICARE

## 2018-05-01 VITALS
BODY MASS INDEX: 27.92 KG/M2 | HEIGHT: 65 IN | OXYGEN SATURATION: 100 % | TEMPERATURE: 97.4 F | HEART RATE: 108 BPM | RESPIRATION RATE: 18 BRPM | DIASTOLIC BLOOD PRESSURE: 89 MMHG | WEIGHT: 167.55 LBS | SYSTOLIC BLOOD PRESSURE: 128 MMHG

## 2018-05-01 VITALS
BODY MASS INDEX: 27.92 KG/M2 | HEART RATE: 108 BPM | OXYGEN SATURATION: 100 % | RESPIRATION RATE: 18 BRPM | HEIGHT: 65 IN | DIASTOLIC BLOOD PRESSURE: 89 MMHG | WEIGHT: 167.55 LBS | TEMPERATURE: 97.4 F | SYSTOLIC BLOOD PRESSURE: 128 MMHG

## 2018-05-01 VITALS
DIASTOLIC BLOOD PRESSURE: 68 MMHG | SYSTOLIC BLOOD PRESSURE: 120 MMHG | RESPIRATION RATE: 18 BRPM | TEMPERATURE: 97.1 F | BODY MASS INDEX: 29.67 KG/M2 | HEIGHT: 63 IN | WEIGHT: 167.44 LBS | OXYGEN SATURATION: 98 % | HEART RATE: 103 BPM

## 2018-05-01 DIAGNOSIS — C20 RECTAL CANCER (HCC): ICD-10-CM

## 2018-05-01 DIAGNOSIS — C78.7 SECONDARY MALIGNANT NEOPLASM OF LIVER (HCC): ICD-10-CM

## 2018-05-01 LAB
ALBUMIN SERPL BCP-MCNC: 3.9 G/DL (ref 3.2–4.9)
ALBUMIN/GLOB SERPL: 1.5 G/DL
ALP SERPL-CCNC: 204 U/L (ref 30–99)
ALT SERPL-CCNC: 34 U/L (ref 2–50)
ANION GAP SERPL CALC-SCNC: 8 MMOL/L (ref 0–11.9)
AST SERPL-CCNC: 34 U/L (ref 12–45)
BASOPHILS # BLD AUTO: 1.1 % (ref 0–1.8)
BASOPHILS # BLD: 0.05 K/UL (ref 0–0.12)
BILIRUB SERPL-MCNC: 0.8 MG/DL (ref 0.1–1.5)
BUN SERPL-MCNC: 22 MG/DL (ref 8–22)
CALCIUM SERPL-MCNC: 9.5 MG/DL (ref 8.5–10.5)
CEA SERPL-MCNC: 3 NG/ML (ref 0–3)
CHLORIDE SERPL-SCNC: 108 MMOL/L (ref 96–112)
CO2 SERPL-SCNC: 21 MMOL/L (ref 20–33)
CREAT SERPL-MCNC: 1.06 MG/DL (ref 0.5–1.4)
EOSINOPHIL # BLD AUTO: 0.12 K/UL (ref 0–0.51)
EOSINOPHIL NFR BLD: 2.7 % (ref 0–6.9)
ERYTHROCYTE [DISTWIDTH] IN BLOOD BY AUTOMATED COUNT: 59.7 FL (ref 35.9–50)
GLOBULIN SER CALC-MCNC: 2.6 G/DL (ref 1.9–3.5)
GLUCOSE SERPL-MCNC: 101 MG/DL (ref 65–99)
HCT VFR BLD AUTO: 37.9 % (ref 37–47)
HGB BLD-MCNC: 11.7 G/DL (ref 12–16)
IMM GRANULOCYTES # BLD AUTO: 0.02 K/UL (ref 0–0.11)
IMM GRANULOCYTES NFR BLD AUTO: 0.4 % (ref 0–0.9)
LYMPHOCYTES # BLD AUTO: 1.25 K/UL (ref 1–4.8)
LYMPHOCYTES NFR BLD: 27.7 % (ref 22–41)
MCH RBC QN AUTO: 26 PG (ref 27–33)
MCHC RBC AUTO-ENTMCNC: 30.9 G/DL (ref 33.6–35)
MCV RBC AUTO: 84.2 FL (ref 81.4–97.8)
MONOCYTES # BLD AUTO: 0.58 K/UL (ref 0–0.85)
MONOCYTES NFR BLD AUTO: 12.8 % (ref 0–13.4)
NEUTROPHILS # BLD AUTO: 2.5 K/UL (ref 2–7.15)
NEUTROPHILS NFR BLD: 55.3 % (ref 44–72)
NRBC # BLD AUTO: 0 K/UL
NRBC BLD-RTO: 0 /100 WBC
PLATELET # BLD AUTO: 217 K/UL (ref 164–446)
PMV BLD AUTO: 10 FL (ref 9–12.9)
POTASSIUM SERPL-SCNC: 3.6 MMOL/L (ref 3.6–5.5)
PROT SERPL-MCNC: 6.5 G/DL (ref 6–8.2)
RBC # BLD AUTO: 4.5 M/UL (ref 4.2–5.4)
SODIUM SERPL-SCNC: 137 MMOL/L (ref 135–145)
WBC # BLD AUTO: 4.5 K/UL (ref 4.8–10.8)

## 2018-05-01 PROCEDURE — 96375 TX/PRO/DX INJ NEW DRUG ADDON: CPT

## 2018-05-01 PROCEDURE — 80053 COMPREHEN METABOLIC PANEL: CPT

## 2018-05-01 PROCEDURE — 99214 OFFICE O/P EST MOD 30 MIN: CPT | Performed by: NURSE PRACTITIONER

## 2018-05-01 PROCEDURE — 700111 HCHG RX REV CODE 636 W/ 250 OVERRIDE (IP): Performed by: NURSE PRACTITIONER

## 2018-05-01 PROCEDURE — 700111 HCHG RX REV CODE 636 W/ 250 OVERRIDE (IP)

## 2018-05-01 PROCEDURE — A4212 NON CORING NEEDLE OR STYLET: HCPCS

## 2018-05-01 PROCEDURE — G0498 CHEMO EXTEND IV INFUS W/PUMP: HCPCS

## 2018-05-01 PROCEDURE — 85025 COMPLETE CBC W/AUTO DIFF WBC: CPT

## 2018-05-01 PROCEDURE — 96374 THER/PROPH/DIAG INJ IV PUSH: CPT | Mod: XU

## 2018-05-01 PROCEDURE — 82378 CARCINOEMBRYONIC ANTIGEN: CPT

## 2018-05-01 RX ORDER — PROCHLORPERAZINE MALEATE 10 MG
10 TABLET ORAL EVERY 6 HOURS PRN
Status: CANCELLED | OUTPATIENT
Start: 2018-05-15

## 2018-05-01 RX ORDER — PROCHLORPERAZINE MALEATE 10 MG
10 TABLET ORAL EVERY 6 HOURS PRN
Status: CANCELLED | OUTPATIENT
Start: 2018-05-01

## 2018-05-01 RX ORDER — ONDANSETRON 2 MG/ML
4 INJECTION INTRAMUSCULAR; INTRAVENOUS
Status: CANCELLED | OUTPATIENT
Start: 2018-05-15

## 2018-05-01 RX ORDER — DEXAMETHASONE SODIUM PHOSPHATE 4 MG/ML
8 INJECTION, SOLUTION INTRA-ARTICULAR; INTRALESIONAL; INTRAMUSCULAR; INTRAVENOUS; SOFT TISSUE ONCE
Status: CANCELLED | OUTPATIENT
Start: 2018-05-01 | End: 2018-05-01

## 2018-05-01 RX ORDER — ONDANSETRON 2 MG/ML
4 INJECTION INTRAMUSCULAR; INTRAVENOUS
Status: CANCELLED | OUTPATIENT
Start: 2018-05-01

## 2018-05-01 RX ORDER — DEXAMETHASONE SODIUM PHOSPHATE 4 MG/ML
8 INJECTION, SOLUTION INTRA-ARTICULAR; INTRALESIONAL; INTRAMUSCULAR; INTRAVENOUS; SOFT TISSUE ONCE
Status: COMPLETED | OUTPATIENT
Start: 2018-05-01 | End: 2018-05-01

## 2018-05-01 RX ORDER — LIDOCAINE HYDROCHLORIDE 10 MG/ML
10 INJECTION, SOLUTION INFILTRATION; PERINEURAL ONCE
Status: CANCELLED | OUTPATIENT
Start: 2018-05-15 | End: 2018-05-15

## 2018-05-01 RX ORDER — LIDOCAINE HYDROCHLORIDE 10 MG/ML
10 INJECTION, SOLUTION INFILTRATION; PERINEURAL ONCE
Status: CANCELLED | OUTPATIENT
Start: 2018-05-01 | End: 2018-05-01

## 2018-05-01 RX ORDER — ONDANSETRON 8 MG/1
8 TABLET, ORALLY DISINTEGRATING ORAL
Status: CANCELLED | OUTPATIENT
Start: 2018-05-15

## 2018-05-01 RX ORDER — DEXAMETHASONE SODIUM PHOSPHATE 4 MG/ML
8 INJECTION, SOLUTION INTRA-ARTICULAR; INTRALESIONAL; INTRAMUSCULAR; INTRAVENOUS; SOFT TISSUE ONCE
Status: CANCELLED | OUTPATIENT
Start: 2018-05-15 | End: 2018-05-15

## 2018-05-01 RX ORDER — DEXTROSE MONOHYDRATE 50 MG/ML
INJECTION, SOLUTION INTRAVENOUS CONTINUOUS
Status: CANCELLED | OUTPATIENT
Start: 2018-05-15

## 2018-05-01 RX ORDER — DEXTROSE MONOHYDRATE 50 MG/ML
INJECTION, SOLUTION INTRAVENOUS CONTINUOUS
Status: CANCELLED | OUTPATIENT
Start: 2018-05-01

## 2018-05-01 RX ORDER — ONDANSETRON 8 MG/1
8 TABLET, ORALLY DISINTEGRATING ORAL
Status: CANCELLED | OUTPATIENT
Start: 2018-05-01

## 2018-05-01 RX ADMIN — HEPARIN 500 UNITS: 100 SYRINGE at 08:38

## 2018-05-01 RX ADMIN — FLUOROURACIL 3590 MG: 50 INJECTION, SOLUTION INTRAVENOUS at 11:29

## 2018-05-01 RX ADMIN — DEXAMETHASONE SODIUM PHOSPHATE 8 MG: 4 INJECTION, SOLUTION INTRAMUSCULAR; INTRAVENOUS at 10:35

## 2018-05-01 ASSESSMENT — ENCOUNTER SYMPTOMS
CHILLS: 0
FEVER: 0
DIARRHEA: 0
SHORTNESS OF BREATH: 1
NAUSEA: 0
MYALGIAS: 0
INSOMNIA: 0
PALPITATIONS: 0
DIZZINESS: 0
COUGH: 0
VOMITING: 0
WEIGHT LOSS: 0
HEADACHES: 0
CONSTIPATION: 0
WHEEZING: 0

## 2018-05-01 ASSESSMENT — PAIN SCALES - GENERAL
PAINLEVEL: NO PAIN
PAINLEVEL_OUTOF10: 0
PAINLEVEL_OUTOF10: 0

## 2018-05-01 NOTE — PROGRESS NOTES
"Pharmacy Chemotherapy Verification Note:    Patient Name: Karlene Walters      Dx: Colon CA        Protocol: 5-FU + Leucovorin  *Dosing Reference*       IV over 2 hours on Day 1, followed by    IVP on Day 1, followed by         -- 10/31/17: delete Leucovorin and 5-FU push d/t neutropenia per Dr. Hardy   Fluorouracil   IV continuous infusion daily on Days 1 and 2 ( IV over 46-48 hours)        -- 20% reduction (1920 mg/m2) to be continued starting C28 per C Alsop APRN   14 day cycles for 12 cycles (adjuvant) or until disease progression or unacceptable toxicity   NCCN Guidelines for Colon Cancer. V.2.2015.  Jay T, et al. Eur J Cancer.1999;35(9):1343-7.      Allergies:  Codeine; Oxaliplatin; Pcn; Sulfa drugs; and Tape       /89   Pulse (!) 108   Temp 36.3 °C (97.4 °F)   Resp 18   Ht 1.66 m (5' 5.35\")   Wt 76 kg (167 lb 8.8 oz)   SpO2 100%   BMI 27.58 kg/m²  Body surface area is 1.87 meters squared.    Labs 5/1/18:  ANC~ 2500   Plt = 217 k     Hgb = 11.7     SCr = 1.06 mg/dL  CrCl ~ 52 mL/min    AST/ALT/AP = 34/34/204 TBili = 0.8        Drug Order   (Drug name, dose, route, IV Fluid & volume, frequency, number of doses) Cycle 58 (C50 in [place])  Previous treatment: C57 = 4/17/18     Medication = Fluorouracil (5-FU)   Base Dose = 1920 mg/m²  Calc Dose:Base Dose x 1.87 m² = 3590 mg  Final Dose = 3590 mg   Route = CIVI  Drug Conc = 50 mg/mL  Fluid & Volume = 71.8 mL (+ 3 mL overfill = 74.8 mL)  Admin Duration = CIVI over 46 hrs @ 1.6 mL/hr   Via CADD pump for home infusion        <5% difference, ok to treat with final dose     By my signature below, I confirm this process was performed independently with the BSA and all final chemotherapy dosing calculations congruent. I have reviewed the above chemotherapy order and that my calculation of the final dose and BSA (when applicable) corroborate those calculations of the  pharmacist.       Yi Knott, PharmD    "

## 2018-05-01 NOTE — PROGRESS NOTES
Chemotherapy Verification - PRIMARY RN      Height = 65.35 in  Weight = 76 kg  BSA = 1.87 m2       Medication: 5 FU pump  Dose: 1920 mg/m2 over 46 hours  Calculated Dose: 3590.4 mg over 46 hours                             (In mg/m2, AUC, mg/kg)     I confirm this process was performed independently with the BSA and all final chemotherapy dosing calculations congruent.  Any discrepancies of 5% or greater have been addressed with the chemotherapy pharmacist. The resolution of the discrepancy has been documented in the EPIC progress notes.

## 2018-05-01 NOTE — PROGRESS NOTES
Patient presents ambulatory for chemotherapy home pump connect.  Patient reports feeling well and denies any s/s of infection at this time.  Port remains accessed from earlier this am, flushed, and brisk blood return noted.  Labs reviewed and are within parameters to proceed with treatment.  Home pump verified with second RN, settings verified, and connected.  Patient to return 5/3/18 for disconnect, confirmed with patient.  Patient left ambulatory in no distress.

## 2018-05-01 NOTE — PROGRESS NOTES
Chemotherapy Verification - SECONDARY RN       Height = 166 cm  Weight = 76 kg  BSA = 1.87 m2       Medication: Home infusion 5FU  Dose: 1920 mg/m2  Calculated Dose: 3594.2 mg                             (In mg/m2, AUC, mg/kg)       I confirm that this process was performed independently.

## 2018-05-03 ENCOUNTER — OUTPATIENT INFUSION SERVICES (OUTPATIENT)
Dept: ONCOLOGY | Facility: MEDICAL CENTER | Age: 78
End: 2018-05-03
Attending: INTERNAL MEDICINE
Payer: MEDICARE

## 2018-05-03 VITALS
HEIGHT: 65 IN | TEMPERATURE: 97.3 F | HEART RATE: 99 BPM | WEIGHT: 169.09 LBS | DIASTOLIC BLOOD PRESSURE: 66 MMHG | OXYGEN SATURATION: 100 % | BODY MASS INDEX: 28.17 KG/M2 | SYSTOLIC BLOOD PRESSURE: 121 MMHG | RESPIRATION RATE: 18 BRPM

## 2018-05-03 DIAGNOSIS — C20 RECTAL CANCER (HCC): ICD-10-CM

## 2018-05-03 PROCEDURE — 96523 IRRIG DRUG DELIVERY DEVICE: CPT

## 2018-05-03 PROCEDURE — 700111 HCHG RX REV CODE 636 W/ 250 OVERRIDE (IP)

## 2018-05-03 RX ADMIN — HEPARIN 500 UNITS: 100 SYRINGE at 09:31

## 2018-05-03 ASSESSMENT — PAIN SCALES - GENERAL: PAINLEVEL_OUTOF10: 0

## 2018-05-03 NOTE — PROGRESS NOTES
Patient arrived for CADD pump deaccess. CADD pump confirmed empty.  Port flushed per protocol, de-accessed.  Pt reports no concerns or side effects. Heart monitor removed as well, pt to send back for processing. Confirmed next appt; pt discharged in no acute distress.

## 2018-05-14 NOTE — PROGRESS NOTES
"Pharmacy Chemotherapy Verification Note:    Patient Name: Karlene Walters      Dx: Colon CA        Cycle 59   Previous treatment: C58  5/1/18    Protocol: 5-FU+Leucovorin       IV over 2 hours on Day 1, followed by   IVP on Day 1, followed by    - Dose reduced by 20% to 320 mg/m² starting with Cycle 18 ([place] #11) on 7/5/16 due to neutropenia    10/31/17: delete Leucovorin and 5-FU push d/t neutropenia per Dr. Hardy   Fluorouracil 1200 mg/m²  IV continuous infusion daily on Days 1 and 2 (2400 mg/m² IV over 46-48 hours) -    - Infusion Dose reduced by 20% starting with Cycle 18 ([place] #11) on 7/5/16 due to neutropenia   -20% reduction (1920 mg/m2) to be continued starting C28 per C Alsop APRN   14 day cycles for 12 cycles (adjuvant) or until disease progression or unacceptable toxicity  NCCN Guidelines for Colon Cancer. V.2.2015.  Jay T, et al. Eur J Cancer.1999;35(9):1343-7.      Allergies:  Codeine; Oxaliplatin; Pcn; Sulfa drugs; and Tape     /66   Pulse 88   Temp 36.4 °C (97.5 °F)   Resp 18   Ht 1.66 m (5' 5.35\")   Wt 75.5 kg (166 lb 7.2 oz)   SpO2 99%   BMI 27.40 kg/m²    Body surface area is 1.87 meters squared.     ANC~ 2510 Plt = 197k   Hgb = 11.9     SCr = 1.06mg/dL CrCl ~ 52mL/min   LFT's = WNL, except AP = 204 TBili = 0.8        Fluorouracil (5-FU) 1920 mg/m² x  1.87m² = 3590mg              <5% difference, OK to treat with final dose = 3590mg    CIVI over 46 hours Via home infusion pump at 1.6ml/hr                 Nakia Arroyo, PharmD    "

## 2018-05-14 NOTE — PROGRESS NOTES
"Pharmacy Chemotherapy Verification Note:    Patient Name: Karlene Walters      Dx: Colon CA        Protocol: 5-FU + Leucovorin  *Dosing Reference*       IV over 2 hours on Day 1, followed by    IVP on Day 1, followed by         -- 10/31/17: delete Leucovorin and 5-FU push d/t neutropenia per Dr. Hardy   Fluorouracil   IV continuous infusion daily on Days 1 and 2 ( IV over 46-48 hours)        -- 20% reduction (1920 mg/m2) to be continued starting C28 per C Alsop APRN   14 day cycles for 12 cycles (adjuvant) or until disease progression or unacceptable toxicity   NCCN Guidelines for Colon Cancer. V.2.2015.  Jay T, et al. Eur J Cancer.1999;35(9):1343-7.      Allergies:  Codeine; Oxaliplatin; Pcn; Sulfa drugs; and Tape       /66   Pulse 88   Temp 36.4 °C (97.5 °F)   Resp 18   Ht 1.66 m (5' 5.35\")   Wt 75.5 kg (166 lb 7.2 oz)   SpO2 99%   BMI 27.40 kg/m²  Body surface area is 1.87 meters squared.    Labs 5/15/18:  ANC~ 2500 Plt = 197k   Hgb = 11.9       Labs 05/01/18:  SCr = 1.06 mg/dL CrCl ~ 52 mL/min  AST/ALT/AP = 34/34/204 TBili = 0.8        Drug Order   (Drug name, dose, route, IV Fluid & volume, frequency, number of doses) Cycle 59 (C51 in [place])  Previous treatment: C58 = 05/01/18     Medication = Fluorouracil (5-FU)   Base Dose = 1920 mg/m²  Calc Dose:Base Dose x 1.87 m² = 3590 mg  Final Dose = 3590 mg   Route = CIVI  Drug Conc = 50 mg/mL  Fluid & Volume = 71.8 mL (+ 3 mL overfill = 74.8 mL)  Admin Duration = CIVI over 46 hrs @ 1.6 mL/hr   Via CADD pump for home infusion        <5% difference, ok to treat with final dose     By my signature below, I confirm this process was performed independently with the BSA and all final chemotherapy dosing calculations congruent. I have reviewed the above chemotherapy order and that my calculation of the final dose and BSA (when applicable) corroborate those calculations of the  pharmacist.       Lalit Andrade, PharmD, BCOP    "

## 2018-05-15 ENCOUNTER — OUTPATIENT INFUSION SERVICES (OUTPATIENT)
Dept: ONCOLOGY | Facility: MEDICAL CENTER | Age: 78
End: 2018-05-15
Attending: INTERNAL MEDICINE
Payer: MEDICARE

## 2018-05-15 ENCOUNTER — OFFICE VISIT (OUTPATIENT)
Dept: CARDIOLOGY | Facility: MEDICAL CENTER | Age: 78
End: 2018-05-15
Payer: MEDICARE

## 2018-05-15 VITALS
SYSTOLIC BLOOD PRESSURE: 124 MMHG | DIASTOLIC BLOOD PRESSURE: 80 MMHG | HEART RATE: 104 BPM | HEIGHT: 65 IN | WEIGHT: 169.2 LBS | OXYGEN SATURATION: 94 % | BODY MASS INDEX: 28.19 KG/M2

## 2018-05-15 VITALS
WEIGHT: 166.45 LBS | TEMPERATURE: 97.5 F | DIASTOLIC BLOOD PRESSURE: 66 MMHG | BODY MASS INDEX: 27.73 KG/M2 | HEART RATE: 88 BPM | OXYGEN SATURATION: 99 % | HEIGHT: 65 IN | SYSTOLIC BLOOD PRESSURE: 130 MMHG | RESPIRATION RATE: 18 BRPM

## 2018-05-15 DIAGNOSIS — N28.9 RENAL INSUFFICIENCY: ICD-10-CM

## 2018-05-15 DIAGNOSIS — E78.5 HYPERLIPIDEMIA, UNSPECIFIED HYPERLIPIDEMIA TYPE: ICD-10-CM

## 2018-05-15 DIAGNOSIS — R94.31 ABNORMAL EKG: ICD-10-CM

## 2018-05-15 DIAGNOSIS — I10 ESSENTIAL HYPERTENSION: ICD-10-CM

## 2018-05-15 DIAGNOSIS — Z86.711 HISTORY OF PULMONARY EMBOLISM: ICD-10-CM

## 2018-05-15 DIAGNOSIS — C20 RECTAL CANCER (HCC): ICD-10-CM

## 2018-05-15 DIAGNOSIS — J43.9 PULMONARY EMPHYSEMA, UNSPECIFIED EMPHYSEMA TYPE (HCC): ICD-10-CM

## 2018-05-15 DIAGNOSIS — R06.02 SHORTNESS OF BREATH: ICD-10-CM

## 2018-05-15 LAB
BASOPHILS # BLD AUTO: 0.9 % (ref 0–1.8)
BASOPHILS # BLD: 0.04 K/UL (ref 0–0.12)
EOSINOPHIL # BLD AUTO: 0.16 K/UL (ref 0–0.51)
EOSINOPHIL NFR BLD: 3.6 % (ref 0–6.9)
ERYTHROCYTE [DISTWIDTH] IN BLOOD BY AUTOMATED COUNT: 62.2 FL (ref 35.9–50)
HCT VFR BLD AUTO: 37.5 % (ref 37–47)
HGB BLD-MCNC: 11.9 G/DL (ref 12–16)
IMM GRANULOCYTES # BLD AUTO: 0.01 K/UL (ref 0–0.11)
IMM GRANULOCYTES NFR BLD AUTO: 0.2 % (ref 0–0.9)
LYMPHOCYTES # BLD AUTO: 1.12 K/UL (ref 1–4.8)
LYMPHOCYTES NFR BLD: 25.2 % (ref 22–41)
MCH RBC QN AUTO: 26.7 PG (ref 27–33)
MCHC RBC AUTO-ENTMCNC: 31.7 G/DL (ref 33.6–35)
MCV RBC AUTO: 84.3 FL (ref 81.4–97.8)
MONOCYTES # BLD AUTO: 0.6 K/UL (ref 0–0.85)
MONOCYTES NFR BLD AUTO: 13.5 % (ref 0–13.4)
NEUTROPHILS # BLD AUTO: 2.51 K/UL (ref 2–7.15)
NEUTROPHILS NFR BLD: 56.6 % (ref 44–72)
NRBC # BLD AUTO: 0 K/UL
NRBC BLD-RTO: 0 /100 WBC
PLATELET # BLD AUTO: 197 K/UL (ref 164–446)
PMV BLD AUTO: 9.9 FL (ref 9–12.9)
RBC # BLD AUTO: 4.45 M/UL (ref 4.2–5.4)
WBC # BLD AUTO: 4.4 K/UL (ref 4.8–10.8)

## 2018-05-15 PROCEDURE — 99213 OFFICE O/P EST LOW 20 MIN: CPT | Performed by: INTERNAL MEDICINE

## 2018-05-15 PROCEDURE — 700111 HCHG RX REV CODE 636 W/ 250 OVERRIDE (IP): Performed by: NURSE PRACTITIONER

## 2018-05-15 PROCEDURE — A4212 NON CORING NEEDLE OR STYLET: HCPCS

## 2018-05-15 PROCEDURE — 96375 TX/PRO/DX INJ NEW DRUG ADDON: CPT

## 2018-05-15 PROCEDURE — 85025 COMPLETE CBC W/AUTO DIFF WBC: CPT

## 2018-05-15 PROCEDURE — G0498 CHEMO EXTEND IV INFUS W/PUMP: HCPCS

## 2018-05-15 RX ORDER — DEXAMETHASONE SODIUM PHOSPHATE 4 MG/ML
8 INJECTION, SOLUTION INTRA-ARTICULAR; INTRALESIONAL; INTRAMUSCULAR; INTRAVENOUS; SOFT TISSUE ONCE
Status: COMPLETED | OUTPATIENT
Start: 2018-05-15 | End: 2018-05-15

## 2018-05-15 RX ADMIN — DEXAMETHASONE SODIUM PHOSPHATE 8 MG: 4 INJECTION, SOLUTION INTRAMUSCULAR; INTRAVENOUS at 10:16

## 2018-05-15 RX ADMIN — FLUOROURACIL 3590 MG: 50 INJECTION, SOLUTION INTRAVENOUS at 12:08

## 2018-05-15 ASSESSMENT — ENCOUNTER SYMPTOMS
COUGH: 0
WEAKNESS: 0
EYES NEGATIVE: 1
MUSCULOSKELETAL NEGATIVE: 1
NEUROLOGICAL NEGATIVE: 1
DIZZINESS: 0
STRIDOR: 0
WHEEZING: 0
SPUTUM PRODUCTION: 0
CARDIOVASCULAR NEGATIVE: 1
SHORTNESS OF BREATH: 0
BRUISES/BLEEDS EASILY: 0
HEMOPTYSIS: 0
CLAUDICATION: 0
PND: 0
SORE THROAT: 0
RESPIRATORY NEGATIVE: 1
PALPITATIONS: 0
FEVER: 0
CHILLS: 0
LOSS OF CONSCIOUSNESS: 0
ORTHOPNEA: 0
GASTROINTESTINAL NEGATIVE: 1
CONSTITUTIONAL NEGATIVE: 1

## 2018-05-15 ASSESSMENT — PAIN SCALES - GENERAL: PAINLEVEL_OUTOF10: 0

## 2018-05-15 NOTE — PROGRESS NOTES
Chief Complaint   Patient presents with   • Hyperlipidemia     F/V: ZIO PATCH RESULTS IN EPIC       Subjective:   Karlene Walters is a 78 y.o. female who presents today as a follow-up for her PEs hypertension and chest pain. She is really not gotten any chest pain in quite some time. She does have nitroglycerin and has not had to use it. She continues to get infusions of chemotherapy. He is not having any chest pain her blood pressure is controlled and she is not having any worsening shortness of breath. She continues to have occasional palpitations. The results of her event recorder showed only occasional nonsustained SVT which was terminating less than 10 seconds.    Past Medical History:   Diagnosis Date   • Arrhythmia    • Blood clotting disorder (HCC) 08/2015    bilat PE    • Blood transfusion 1957   • Cancer (HCC) 09/2012    rectal cancer,  Liver CA-2015   • CATARACT     removed b/l   • Chemotherapy-induced neutropenia (HCC) 9/28/2016   • Chickenpox    • Colon cancer (HCC)    • Dental disorder     dentures UPPERS AND LOWERS   • Elevated alkaline phosphatase level 11/15/2017   • Fall    • Citizen of [place] measles    • Hemorrhagic disorder (HCC)     on Xarelto    • Hypercholesteremia    • Hypertension     no meds currently    • Ileostomy in place (HCC)    • Indigestion    • Influenza    • Lightheadedness 9/17/2015   • Mumps    • Obstruction     ileostomy   • Other specified symptom associated with female genital organs     HYSTERECTOMY 1993   • Pneumonia 05/2017    tx'd with antibx   • Pulmonary embolism (HCC)    • 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)    • Renal insufficiency 3/14/2016   • Routine general medical examination at a health care facility 3/14/2016    3/14/16   • Seasonal allergies    • Tonsillitis      Past Surgical History:   Procedure Laterality Date   • COLONOSCOPY WITH BIOPSY  8/23/2015    Procedure: COLONOSCOPY WITH BIOPSY;  Surgeon: Wilbur Glasgow M.D.;  Location: ENDOSCOPY [facility]  ORS;  Service:    • HEPATIC ABLATION LAPAROSCOPIC  7/6/2015    Procedure: HEPATIC ABLATION LAPAROSCOPIC.;  Surgeon: Kit West M.D.;  Location: SURGERY [facility];  Service:    • CATH PLACEMENT Left 7/6/2015    Procedure: CATH PLACEMENT CEPHALIC POWERPORT;  Surgeon: Kit West M.D.;  Location: SURGERY [facility];  Service:    • FISTULA IN ANO REPAIR  1/28/2015    Performed by Kolton Montgomery M.D. at SURGERY [facility]   • PERINEAL PROCEDURE  1/28/2015    Performed by Kolton Montgomery M.D. at SURGERY [facility]   • FISTULA IN ANO REPAIR  10/15/2014    Performed by Kolton Montgomery M.D. at [facility]   • PERINEAL PROCEDURE  10/15/2014    Performed by Kolton Montgomery M.D. at [facility]   • IRRIGATION & DEBRIDEMENT GENERAL  2/19/2014    Performed by Kolton Montgomery M.D. at SURGERY [facility]   • FLAP GRAFT  2/19/2014    Performed by Kolton Montgomery M.D. at [facility]   • PERINEAL PROCEDURE  12/18/2013    Performed by Kolton Montgomery M.D. at [facility]   • MYOCUTANEOUS FLAP  12/18/2013    Performed by Kolton Montgomery M.D. at [facility]   • IRRIGATION & DEBRIDEMENT GENERAL  10/23/2013    Performed by Kolton Montgomery M.D. at SURGERY [facility]   • FISTULA IN ANO REPAIR  9/4/2013    Performed by Kolton Montgomery M.D. at [facility]   • FLAP ROTATION  9/4/2013    Performed by Kolton Montgomery M.D. at SURGERY [facility]   • RECOVERY  8/26/2013    Performed by Cath-Recovery Surgery at SURGERY SAME DAY [facility]   • PROCTOSCOPY  7/17/2013    Performed by Kolton Montgomery M.D. at [facility]   • BIOPSY GENERAL  7/17/2013    Performed by Kolton Montgomery M.D. at SURGERY [facility]   • SIGMOIDOSCOPY  2/27/2013    Performed by Kolton Montgomery M.D. at [facility]   • FISTULA IN ANO REPAIR  2/27/2013    Performed by Kolton Montgomery M.D. at SURGERY [facility] ORS   • LAPAROSCOPY  10/8/2012     "Performed by Kolton Montgomery M.D. at SURGERY [facility] ORS   • ILEO LOOP DIVERSION  10/8/2012    Performed by Kolton Montgomery M.D. at SURGERY [facility]   • COLECTOMY  9/26/2012    Performed by Kolton Montgomery M.D. at SURGERY [facility]   • COLONOSCOPY FLEX W/ENDO US  9/20/2012    Performed by Harshil Bolton M.D. at SURGERY [facility]   • OTHER  2009    left eye torn retina repair   • CATARACT EXTRACTION WITH IOL  2004    bilateral   • ABDOMINAL HYSTERECTOMY TOTAL  1983   • CYST EXCISION  1972    ovarian   • COLON RESECTION     • EYE SURGERY      cataracts   • HYSTERECTOMY LAPAROSCOPY     • PB REMV 2ND CATARACT,CORN-SCLER SECTN     • TONSILLECTOMY       Family History   Problem Relation Age of Onset   • Heart Disease Mother    • Heart Failure Mother    • Hypertension Mother    • Hyperlipidemia Mother    • Cancer Mother    • Cancer Maternal Aunt 60     breast ca   • Lung Disease Brother      COPD/Emphysema/Smoker   • Cancer Brother      prostate cancer   • Alcohol/Drug Brother      Alcohol   • Kidney Disease Father      renal failure     Social History     Social History   • Marital status:      Spouse name: N/A   • Number of children: N/A   • Years of education: N/A     Occupational History   • Not on file.     Social History Main Topics   • Smoking status: Never Smoker   • Smokeless tobacco: Never Used   • Alcohol use No      Comment: rare   • Drug use: No   • Sexual activity: No     Other Topics Concern   • Not on file     Social History Narrative   • No narrative on file     Allergies   Allergen Reactions   • Codeine      \"gets drunk\"   • Oxaliplatin Anaphylaxis   • Pcn [Penicillins] Itching   • Sulfa Drugs Itching   • Tape Rash     PAPER TAPE OK     Outpatient Encounter Prescriptions as of 5/15/2018   Medication Sig Dispense Refill   • potassium chloride ER (KLOR-CON) 10 MEQ tablet TAKE ONE TABLET EVERY DAY AS NEEDED 30 Tab 6   • nitroglycerin (NITROSTAT) 0.4 MG SL Tab Place 1 Tab under " tongue as needed for Chest Pain. 25 Tab 0   • raNITidine (ZANTAC) 150 MG Tab TAKE 1 TABLET 2 TIMES A DAY 60 Tab 3   • rivaroxaban (XARELTO) 10 MG Tab tablet Take 1 Tab by mouth every day. 30 Tab 6   • oxycodone immediate-release (ROXICODONE) 5 MG Tab Take 1 Tab by mouth every four hours as needed for Severe Pain. 30 Tab 0   • Tiotropium Bromide-Olodaterol (STIOLTO RESPIMAT) 2.5-2.5 MCG/ACT Aero Soln Take 2 Puffs by mouth every day. 1 Inhaler 11   • albuterol (PROAIR HFA) 108 (90 Base) MCG/ACT Aero Soln inhalation aerosol Inhale 2 Puffs by mouth every four hours as needed for Shortness of Breath (wheezing). 1 Inhaler 11   • furosemide (LASIX) 40 MG Tab Take 1 Tab by mouth every day. 30 Tab 11   • ondansetron (ZOFRAN) 4 MG Tab tablet      • Multiple Vitamins-Minerals (CENTRUM SILVER PO) Take  by mouth.     • amLODIPine (NORVASC) 5 MG Tab Take 1 tab 5 days around chemo q 2 weeks 30 Tab 0   • loperamide (IMODIUM) 2 MG Cap Take 2 mg by mouth 4 times a day as needed for Diarrhea.     • lidocaine-prilocaine (EMLA) 2.5-2.5 % Cream APPLY A THICK FILM OVER THE PORT ACCESS SITE PRIOR TO ACCESS 30 g 2   • cyanocobalamin (VITAMIN B-12) 500 MCG Tab Take 500 mcg by mouth every day.     • Cholecalciferol (VITAMIN D3) 400 UNITS CAPS Take 1 Cap by mouth every day.     • loratadine (CLARITIN) 10 MG TABS Take 10 mg by mouth every day. Indications: [place]       Facility-Administered Encounter Medications as of 5/15/2018   Medication Dose Route Frequency Provider Last Rate Last Dose   • [DISCONTINUED] fluorouracil (ADRUCIL) 3,590 mg in CADD Cassette/Bag Chemo infusion (for use in CADD PUMP)  1,920 mg/m2 Intravenous Once Ella De A.P.N.   3,590 mg at 05/15/18 1208   • heparin pf injection 500 Units  500 Units Intracatheter PRN Eric Hardy M.D.   500 Units at 03/09/18 1542   • heparin pf injection 500 Units  500 Units Intracatheter PRN Ella De, A.P.N.   500 Units at 08/22/17 0860     Review of Systems  "  Constitutional: Negative.  Negative for chills, fever and malaise/fatigue.   HENT: Negative.  Negative for sore throat.    Eyes: Negative.    Respiratory: Negative.  Negative for cough, hemoptysis, sputum production, shortness of breath, wheezing and stridor.    Cardiovascular: Negative.  Negative for chest pain, palpitations, orthopnea, claudication, leg swelling and PND.   Gastrointestinal: Negative.    Genitourinary: Negative.    Musculoskeletal: Negative.    Skin: Negative.    Neurological: Negative.  Negative for dizziness, loss of consciousness and weakness.   Endo/Heme/Allergies: Negative.  Does not bruise/bleed easily.   All other systems reviewed and are negative.       Objective:   /80   Pulse (!) 104   Ht 1.651 m (5' 5\")   Wt 76.7 kg (169 lb 3.2 oz)   SpO2 94%   BMI 28.16 kg/m²     Physical Exam   Constitutional: She appears well-developed and well-nourished. No distress.   HENT:   Head: Normocephalic and atraumatic.   Right Ear: External ear normal.   Left Ear: External ear normal.   Nose: Nose normal.   Mouth/Throat: No oropharyngeal exudate.   Eyes: Conjunctivae and EOM are normal. Pupils are equal, round, and reactive to light. Right eye exhibits no discharge. Left eye exhibits no discharge. No scleral icterus.   Neck: Neck supple. No JVD present.   Cardiovascular: Normal rate, regular rhythm and intact distal pulses.  Exam reveals no gallop and no friction rub.    No murmur heard.  Pulmonary/Chest: Effort normal. No stridor. No respiratory distress. She has no wheezes. She has no rales. She exhibits no tenderness.   Abdominal: Soft. She exhibits no distension. There is no guarding.   Musculoskeletal: Normal range of motion. She exhibits no edema, tenderness or deformity.   Neurological: She is alert. She has normal reflexes. She displays normal reflexes. No cranial nerve deficit. She exhibits normal muscle tone. Coordination normal.   Skin: Skin is warm and dry. No rash noted. She is not " diaphoretic. No erythema. No pallor.   Psychiatric: She has a normal mood and affect. Her behavior is normal. Judgment and thought content normal.   Nursing note and vitals reviewed.      Assessment:     1. Abnormal EKG     2. History of pulmonary embolism     3. Hyperlipidemia, unspecified hyperlipidemia type     4. Essential hypertension     5. Pulmonary emphysema, unspecified emphysema type (HCC)     6. Renal insufficiency     7. Shortness of breath         Medical Decision Making:  Today's Assessment / Status / Plan:     76 y/o F with PE on Xarelto with normal echocardiogram. She continues to be stable as well. Her's event recorder did not really show anything that was concerning. She will continue on the oral intake regulation for her PEs. We will see her back in 6 months.    1. Orthostatic tachycardia  - resolved     2. Shortness of breath with exertion and PEs, normal echo  - cont Rivaroxiban 20     3. LE Edema    - lasix 40 PRN    Thank for you allowing me to take part in your patient's care, please call should you have any questions or would like to discuss this patient.

## 2018-05-15 NOTE — PROGRESS NOTES
Pt presents ambulatory with spouse for labs and 5FU home pump hook-up. Pt reports feeling well today with no s/s of infection noted. Emla cream wiped from port site. Port accessed with sterile technique and blood return noted. Labs drawn and reviewed and WNL for treatment. Pre medication given. Blood return verified and pt connected to home infusion pump and pump verified to be in RUN mode. Infuse system updated and appt confirmed for pt's pump d/c. Pt left ambulatory with spouse in no distress at 1218

## 2018-05-15 NOTE — PROGRESS NOTES
Chemotherapy Verification - SECONDARY RN       Height = 166 cm  Weight = 75.5 kg  BSA = 1.865 m2       Medication: Home infusion 5FU  Dose: 1920 mg/m2  Calculated Dose: 3582.4 mg over 46 hours                             (In mg/m2, AUC, mg/kg)       I confirm that this process was performed independently.

## 2018-05-15 NOTE — PROGRESS NOTES
"Chemotherapy Verification - PRIMARY RN      Height = 65.35\"  Weight = 75.5 kg  BSA = 1.86 m2       Medication: Fluorouracil 5FU Home Pump  Dose: 1920 mg/m2  Calculated Dose: 3571.2 mg                              I confirm this process was performed independently with the BSA and all final chemotherapy dosing calculations congruent.  Any discrepancies of 5% or greater have been addressed with the chemotherapy pharmacist. The resolution of the discrepancy has been documented in the EPIC progress notes.       "

## 2018-05-15 NOTE — LETTER
[facility]   [address] [place] [address]  [place] [address]  Phone: [phone]  Fax: [phone]              Karlene Walters  1940    Encounter Date: 5/15/2018    Ahsan Haque M.D.          PROGRESS NOTE:  Chief Complaint   Patient presents with   • Hyperlipidemia     F/V: ZIO PATCH RESULTS IN EPIC       Subjective:   Karlene Walters is a 78 y.o. female who presents today as a follow-up for her PEs hypertension and chest pain. She is really not gotten any chest pain in quite some time. She does have nitroglycerin and has not had to use it. She continues to get infusions of chemotherapy. He is not having any chest pain her blood pressure is controlled and she is not having any worsening shortness of breath. She continues to have occasional palpitations. The results of her event recorder showed only occasional nonsustained SVT which was terminating less than 10 seconds.    Past Medical History:   Diagnosis Date   • Arrhythmia    • Blood clotting disorder (HCC) 08/2015    bilat PE    • Blood transfusion 1957   • Cancer (HCC) 09/2012    rectal cancer,  Liver CA-2015   • CATARACT     removed b/l   • Chemotherapy-induced neutropenia (HCC) 9/28/2016   • Chickenpox    • Colon cancer (HCC)    • Dental disorder     dentures UPPERS AND LOWERS   • Elevated alkaline phosphatase level 11/15/2017   • Fall    • Bulgarian measles    • Hemorrhagic disorder (HCC)     on Xarelto    • Hypercholesteremia    • Hypertension     no meds currently    • Ileostomy in place (HCC)    • Indigestion    • Influenza    • Lightheadedness 9/17/2015   • Mumps    • Obstruction     ileostomy   • Other specified symptom associated with female genital organs     HYSTERECTOMY 1993   • Pneumonia 05/2017    tx'd with antibx   • Pulmonary embolism (HCC)    • 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)    • Renal insufficiency 3/14/2016   • Routine general medical examination at a [facility]  facility 3/14/2016    3/14/16   • Seasonal allergies    • Tonsillitis      Past Surgical History:   Procedure Laterality Date   • COLONOSCOPY WITH BIOPSY  8/23/2015    Procedure: COLONOSCOPY WITH BIOPSY;  Surgeon: Wilbur Glasgow M.D.;  Location: ENDOSCOPY [facility];  Service:    • HEPATIC ABLATION LAPAROSCOPIC  7/6/2015    Procedure: HEPATIC ABLATION LAPAROSCOPIC.;  Surgeon: Kit West M.D.;  Location: SURGERY [facility];  Service:    • CATH PLACEMENT Left 7/6/2015    Procedure: CATH PLACEMENT CEPHALIC POWERPORT;  Surgeon: Kit West M.D.;  Location: SURGERY [facility];  Service:    • FISTULA IN ANO REPAIR  1/28/2015    Performed by Kolton Montgomery M.D. at [facility]   • PERINEAL PROCEDURE  1/28/2015    Performed by Kolton Montgomery M.D. at [facility]   • FISTULA IN ANO REPAIR  10/15/2014    Performed by Kolton Montgomery M.D. at [facility]   • PERINEAL PROCEDURE  10/15/2014    Performed by Kolton Montgomery M.D. at [facility]   • IRRIGATION & DEBRIDEMENT GENERAL  2/19/2014    Performed by Kolton Montgomery M.D. at [facility]   • FLAP GRAFT  2/19/2014    Performed by Kolton Montgomery M.D. at [facility]   • PERINEAL PROCEDURE  12/18/2013    Performed by Kolton Montgomery M.D. at [facility]   • MYOCUTANEOUS FLAP  12/18/2013    Performed by Kolton Montgomery M.D. at [facility]   • IRRIGATION & DEBRIDEMENT GENERAL  10/23/2013    Performed by Kolton Montgomery M.D. at [facility]   • FISTULA IN ANO REPAIR  9/4/2013    Performed by Kolton Montgomery M.D. at [facility]   • FLAP ROTATION  9/4/2013    Performed by Kolton Montgomery M.D. at [facility]   • RECOVERY  8/26/2013    Performed by Cath-Recovery Surgery at SURGERY SAME DAY [facility]   • PROCTOSCOPY  7/17/2013    Performed by Kolton Montgomery M.D. at SURGERY [facility]   • BIOPSY GENERAL  7/17/2013    Performed by Kolton  "LLOYD Montgomery M.D. at SURGERY [facility]   • SIGMOIDOSCOPY  2/27/2013    Performed by Kolton Montgomery M.D. at SURGERY [facility]   • FISTULA IN ANO REPAIR  2/27/2013    Performed by Kolton Montgomery M.D. at SURGERY [facility]   • LAPAROSCOPY  10/8/2012    Performed by Kolton Montgomery M.D. at SURGERY [facility]   • ILEO LOOP DIVERSION  10/8/2012    Performed by Kolton Montgomery M.D. at SURGERY [facility]   • COLECTOMY  9/26/2012    Performed by Kolton Montgomery M.D. at SURGERY [facility]   • COLONOSCOPY FLEX W/ENDO US  9/20/2012    Performed by Harshil Bolton M.D. at SURGERY [facility]   • OTHER  2009    left eye torn retina repair   • CATARACT EXTRACTION WITH IOL  2004    bilateral   • ABDOMINAL HYSTERECTOMY TOTAL  1983   • CYST EXCISION  1972    ovarian   • COLON RESECTION     • EYE SURGERY      cataracts   • HYSTERECTOMY LAPAROSCOPY     • PB REMV 2ND CATARACT,CORN-SCLER SECTN     • TONSILLECTOMY       Family History   Problem Relation Age of Onset   • Heart Disease Mother    • Heart Failure Mother    • Hypertension Mother    • Hyperlipidemia Mother    • Cancer Mother    • Cancer Maternal Aunt 60     breast ca   • Lung Disease Brother      COPD/Emphysema/Smoker   • Cancer Brother      prostate cancer   • Alcohol/Drug Brother      Alcohol   • Kidney Disease Father      renal failure     Social History     Social History   • Marital status:      Spouse name: N/A   • Number of children: N/A   • Years of education: N/A     Occupational History   • Not on file.     Social History Main Topics   • Smoking status: Never Smoker   • Smokeless tobacco: Never Used   • Alcohol use No      Comment: rare   • Drug use: No   • Sexual activity: No     Other Topics Concern   • Not on file     Social History Narrative   • No narrative on file     Allergies   Allergen Reactions   • Codeine      \"gets drunk\"   • Oxaliplatin Anaphylaxis   • Pcn [Penicillins] Itching   • Sulfa Drugs Itching   • Tape Rash     " PAPER TAPE OK     Outpatient Encounter Prescriptions as of 5/15/2018   Medication Sig Dispense Refill   • potassium chloride ER (KLOR-CON) 10 MEQ tablet TAKE ONE TABLET EVERY DAY AS NEEDED 30 Tab 6   • nitroglycerin (NITROSTAT) 0.4 MG SL Tab Place 1 Tab under tongue as needed for Chest Pain. 25 Tab 0   • raNITidine (ZANTAC) 150 MG Tab TAKE 1 TABLET 2 TIMES A DAY 60 Tab 3   • rivaroxaban (XARELTO) 10 MG Tab tablet Take 1 Tab by mouth every day. 30 Tab 6   • oxycodone immediate-release (ROXICODONE) 5 MG Tab Take 1 Tab by mouth every four hours as needed for Severe Pain. 30 Tab 0   • Tiotropium Bromide-Olodaterol (STIOLTO RESPIMAT) 2.5-2.5 MCG/ACT Aero Soln Take 2 Puffs by mouth every day. 1 Inhaler 11   • albuterol (PROAIR HFA) 108 (90 Base) MCG/ACT Aero Soln inhalation aerosol Inhale 2 Puffs by mouth every four hours as needed for Shortness of Breath (wheezing). 1 Inhaler 11   • furosemide (LASIX) 40 MG Tab Take 1 Tab by mouth every day. 30 Tab 11   • ondansetron (ZOFRAN) 4 MG Tab tablet      • Multiple Vitamins-Minerals (CENTRUM SILVER PO) Take  by mouth.     • amLODIPine (NORVASC) 5 MG Tab Take 1 tab 5 days around chemo q 2 weeks 30 Tab 0   • loperamide (IMODIUM) 2 MG Cap Take 2 mg by mouth 4 times a day as needed for Diarrhea.     • lidocaine-prilocaine (EMLA) 2.5-2.5 % Cream APPLY A THICK FILM OVER THE PORT ACCESS SITE PRIOR TO ACCESS 30 g 2   • cyanocobalamin (VITAMIN B-12) 500 MCG Tab Take 500 mcg by mouth every day.     • Cholecalciferol (VITAMIN D3) 400 UNITS CAPS Take 1 Cap by mouth every day.     • loratadine (CLARITIN) 10 MG TABS Take 10 mg by mouth every day. Indications: Hayfever       Facility-Administered Encounter Medications as of 5/15/2018   Medication Dose Route Frequency Provider Last Rate Last Dose   • [DISCONTINUED] fluorouracil (ADRUCIL) 3,590 mg in CADD Cassette/Bag Chemo infusion (for use in CADD PUMP)  1,920 mg/m2 Intravenous Once Ella Alsop, A.P.N.   3,590 mg at 05/15/18 1203   •  "heparin pf injection 500 Units  500 Units Intracatheter PRN Eric Hardy M.D.   500 Units at 03/09/18 1542   • heparin pf injection 500 Units  500 Units Intracatheter PRN ARTURO Krishnan   500 Units at 08/22/17 0856     Review of Systems   Constitutional: Negative.  Negative for chills, fever and malaise/fatigue.   HENT: Negative.  Negative for sore throat.    Eyes: Negative.    Respiratory: Negative.  Negative for cough, hemoptysis, sputum production, shortness of breath, wheezing and stridor.    Cardiovascular: Negative.  Negative for chest pain, palpitations, orthopnea, claudication, leg swelling and PND.   Gastrointestinal: Negative.    Genitourinary: Negative.    Musculoskeletal: Negative.    Skin: Negative.    Neurological: Negative.  Negative for dizziness, loss of consciousness and weakness.   Endo/Heme/Allergies: Negative.  Does not bruise/bleed easily.   All other systems reviewed and are negative.       Objective:   /80   Pulse (!) 104   Ht 1.651 m (5' 5\")   Wt 76.7 kg (169 lb 3.2 oz)   SpO2 94%   BMI 28.16 kg/m²      Physical Exam   Constitutional: She appears well-developed and well-nourished. No distress.   HENT:   Head: Normocephalic and atraumatic.   Right Ear: External ear normal.   Left Ear: External ear normal.   Nose: Nose normal.   Mouth/Throat: No oropharyngeal exudate.   Eyes: Conjunctivae and EOM are normal. Pupils are equal, round, and reactive to light. Right eye exhibits no discharge. Left eye exhibits no discharge. No scleral icterus.   Neck: Neck supple. No JVD present.   Cardiovascular: Normal rate, regular rhythm and intact distal pulses.  Exam reveals no gallop and no friction rub.    No murmur heard.  Pulmonary/Chest: Effort normal. No stridor. No respiratory distress. She has no wheezes. She has no rales. She exhibits no tenderness.   Abdominal: Soft. She exhibits no distension. There is no guarding.   Musculoskeletal: Normal range of motion. She exhibits no " edema, tenderness or deformity.   Neurological: She is alert. She has normal reflexes. She displays normal reflexes. No cranial nerve deficit. She exhibits normal muscle tone. Coordination normal.   Skin: Skin is warm and dry. No rash noted. She is not diaphoretic. No erythema. No pallor.   Psychiatric: She has a normal mood and affect. Her behavior is normal. Judgment and thought content normal.   Nursing note and vitals reviewed.      Assessment:     1. Abnormal EKG     2. History of pulmonary embolism     3. Hyperlipidemia, unspecified hyperlipidemia type     4. Essential hypertension     5. Pulmonary emphysema, unspecified emphysema type (HCC)     6. Renal insufficiency     7. Shortness of breath         Medical Decision Making:  Today's Assessment / Status / Plan:     76 y/o F with PE on Xarelto with normal echocardiogram. She continues to be stable as well. Her's event recorder did not really show anything that was concerning. She will continue on the oral intake regulation for her PEs. We will see her back in 6 months.    1. Orthostatic tachycardia  - resolved     2. Shortness of breath with exertion and PEs, normal echo  - cont Rivaroxiban 20     3. LE Edema    - lasix 40 PRN    Thank for you allowing me to take part in your patient's care, please call should you have any questions or would like to discuss this patient.      Manan Quiñonez M.D.  [address] [place] [address]  [place] [id number]  VIA In Basket

## 2018-05-17 ENCOUNTER — OUTPATIENT INFUSION SERVICES (OUTPATIENT)
Dept: ONCOLOGY | Facility: MEDICAL CENTER | Age: 78
End: 2018-05-17
Attending: INTERNAL MEDICINE
Payer: MEDICARE

## 2018-05-17 VITALS
WEIGHT: 169.97 LBS | TEMPERATURE: 97.5 F | RESPIRATION RATE: 20 BRPM | HEART RATE: 101 BPM | BODY MASS INDEX: 28.32 KG/M2 | DIASTOLIC BLOOD PRESSURE: 65 MMHG | HEIGHT: 65 IN | OXYGEN SATURATION: 98 % | SYSTOLIC BLOOD PRESSURE: 117 MMHG

## 2018-05-17 PROCEDURE — 700111 HCHG RX REV CODE 636 W/ 250 OVERRIDE (IP)

## 2018-05-17 PROCEDURE — 96523 IRRIG DRUG DELIVERY DEVICE: CPT

## 2018-05-17 RX ADMIN — SODIUM CHLORIDE, PRESERVATIVE FREE 500 UNITS: 5 INJECTION INTRAVENOUS at 11:19

## 2018-05-17 ASSESSMENT — PAIN SCALES - GENERAL: PAINLEVEL_OUTOF10: 0

## 2018-05-17 NOTE — PROGRESS NOTES
Pt arrived ambulatory to  for home pump disconnect.  Chemotherapy completed without issue, pt tolerated well.  Pump cartridge appears empty, ordered dose delivered.  Port flushed per policy, de-accessed, and site covered with sterile gauze and tape.  Next appointment confirmed.  Pt discharged from [facility] in NAD under self care.

## 2018-05-29 ENCOUNTER — OFFICE VISIT (OUTPATIENT)
Dept: HEMATOLOGY ONCOLOGY | Facility: MEDICAL CENTER | Age: 78
End: 2018-05-29
Payer: MEDICARE

## 2018-05-29 ENCOUNTER — OUTPATIENT INFUSION SERVICES (OUTPATIENT)
Dept: ONCOLOGY | Facility: MEDICAL CENTER | Age: 78
End: 2018-05-29
Attending: INTERNAL MEDICINE
Payer: MEDICARE

## 2018-05-29 VITALS
SYSTOLIC BLOOD PRESSURE: 122 MMHG | BODY MASS INDEX: 27.77 KG/M2 | OXYGEN SATURATION: 100 % | WEIGHT: 166.67 LBS | HEART RATE: 104 BPM | RESPIRATION RATE: 18 BRPM | HEIGHT: 65 IN | DIASTOLIC BLOOD PRESSURE: 78 MMHG | TEMPERATURE: 97.4 F

## 2018-05-29 VITALS
DIASTOLIC BLOOD PRESSURE: 78 MMHG | HEIGHT: 65 IN | HEART RATE: 104 BPM | SYSTOLIC BLOOD PRESSURE: 122 MMHG | TEMPERATURE: 97.4 F | RESPIRATION RATE: 18 BRPM | OXYGEN SATURATION: 100 % | WEIGHT: 166.67 LBS | BODY MASS INDEX: 27.77 KG/M2

## 2018-05-29 VITALS
WEIGHT: 166.56 LBS | BODY MASS INDEX: 27.75 KG/M2 | HEIGHT: 65 IN | HEART RATE: 109 BPM | RESPIRATION RATE: 18 BRPM | DIASTOLIC BLOOD PRESSURE: 80 MMHG | SYSTOLIC BLOOD PRESSURE: 130 MMHG | TEMPERATURE: 97.9 F | OXYGEN SATURATION: 98 %

## 2018-05-29 DIAGNOSIS — R06.02 SHORTNESS OF BREATH: ICD-10-CM

## 2018-05-29 DIAGNOSIS — C20 RECTAL CANCER (HCC): ICD-10-CM

## 2018-05-29 DIAGNOSIS — D64.9 ANEMIA, UNSPECIFIED TYPE: ICD-10-CM

## 2018-05-29 DIAGNOSIS — R94.4 DECREASED GFR: ICD-10-CM

## 2018-05-29 DIAGNOSIS — C78.7 SECONDARY MALIGNANT NEOPLASM OF LIVER (HCC): ICD-10-CM

## 2018-05-29 LAB
ALBUMIN SERPL BCP-MCNC: 4 G/DL (ref 3.2–4.9)
ALBUMIN/GLOB SERPL: 1.4 G/DL
ALP SERPL-CCNC: 207 U/L (ref 30–99)
ALT SERPL-CCNC: 28 U/L (ref 2–50)
ANION GAP SERPL CALC-SCNC: 12 MMOL/L (ref 0–11.9)
ANISOCYTOSIS BLD QL SMEAR: ABNORMAL
AST SERPL-CCNC: 33 U/L (ref 12–45)
BASOPHILS # BLD AUTO: 1 % (ref 0–1.8)
BASOPHILS # BLD: 0.05 K/UL (ref 0–0.12)
BILIRUB SERPL-MCNC: 0.9 MG/DL (ref 0.1–1.5)
BUN SERPL-MCNC: 23 MG/DL (ref 8–22)
CALCIUM SERPL-MCNC: 10 MG/DL (ref 8.5–10.5)
CEA SERPL-MCNC: 3.5 NG/ML (ref 0–3)
CHLORIDE SERPL-SCNC: 106 MMOL/L (ref 96–112)
CO2 SERPL-SCNC: 22 MMOL/L (ref 20–33)
COMMENT 1642: NORMAL
CREAT SERPL-MCNC: 1.16 MG/DL (ref 0.5–1.4)
EOSINOPHIL # BLD AUTO: 0.17 K/UL (ref 0–0.51)
EOSINOPHIL NFR BLD: 3.5 % (ref 0–6.9)
ERYTHROCYTE [DISTWIDTH] IN BLOOD BY AUTOMATED COUNT: 65.1 FL (ref 35.9–50)
GLOBULIN SER CALC-MCNC: 2.8 G/DL (ref 1.9–3.5)
GLUCOSE SERPL-MCNC: 114 MG/DL (ref 65–99)
HCT VFR BLD AUTO: 40.5 % (ref 37–47)
HGB BLD-MCNC: 12.7 G/DL (ref 12–16)
IMM GRANULOCYTES # BLD AUTO: 0.02 K/UL (ref 0–0.11)
IMM GRANULOCYTES NFR BLD AUTO: 0.4 % (ref 0–0.9)
LYMPHOCYTES # BLD AUTO: 1.54 K/UL (ref 1–4.8)
LYMPHOCYTES NFR BLD: 31.9 % (ref 22–41)
MACROCYTES BLD QL SMEAR: ABNORMAL
MCH RBC QN AUTO: 26.6 PG (ref 27–33)
MCHC RBC AUTO-ENTMCNC: 31.4 G/DL (ref 33.6–35)
MCV RBC AUTO: 84.9 FL (ref 81.4–97.8)
MICROCYTES BLD QL SMEAR: ABNORMAL
MONOCYTES # BLD AUTO: 0.65 K/UL (ref 0–0.85)
MONOCYTES NFR BLD AUTO: 13.5 % (ref 0–13.4)
MORPHOLOGY BLD-IMP: NORMAL
NEUTROPHILS # BLD AUTO: 2.4 K/UL (ref 2–7.15)
NEUTROPHILS NFR BLD: 49.7 % (ref 44–72)
NRBC # BLD AUTO: 0 K/UL
NRBC BLD-RTO: 0 /100 WBC
PLATELET # BLD AUTO: 225 K/UL (ref 164–446)
PLATELET BLD QL SMEAR: NORMAL
PMV BLD AUTO: 10.3 FL (ref 9–12.9)
POTASSIUM SERPL-SCNC: 3.6 MMOL/L (ref 3.6–5.5)
PROT SERPL-MCNC: 6.8 G/DL (ref 6–8.2)
RBC # BLD AUTO: 4.77 M/UL (ref 4.2–5.4)
RBC BLD AUTO: PRESENT
SODIUM SERPL-SCNC: 140 MMOL/L (ref 135–145)
WBC # BLD AUTO: 4.8 K/UL (ref 4.8–10.8)

## 2018-05-29 PROCEDURE — A4212 NON CORING NEEDLE OR STYLET: HCPCS

## 2018-05-29 PROCEDURE — 99214 OFFICE O/P EST MOD 30 MIN: CPT | Performed by: INTERNAL MEDICINE

## 2018-05-29 PROCEDURE — 80053 COMPREHEN METABOLIC PANEL: CPT

## 2018-05-29 PROCEDURE — 36591 DRAW BLOOD OFF VENOUS DEVICE: CPT

## 2018-05-29 PROCEDURE — 700111 HCHG RX REV CODE 636 W/ 250 OVERRIDE (IP): Performed by: INTERNAL MEDICINE

## 2018-05-29 PROCEDURE — 82378 CARCINOEMBRYONIC ANTIGEN: CPT

## 2018-05-29 PROCEDURE — 700111 HCHG RX REV CODE 636 W/ 250 OVERRIDE (IP)

## 2018-05-29 PROCEDURE — 96374 THER/PROPH/DIAG INJ IV PUSH: CPT | Mod: XU

## 2018-05-29 PROCEDURE — 85025 COMPLETE CBC W/AUTO DIFF WBC: CPT

## 2018-05-29 PROCEDURE — G0498 CHEMO EXTEND IV INFUS W/PUMP: HCPCS

## 2018-05-29 RX ORDER — ONDANSETRON 8 MG/1
8 TABLET, ORALLY DISINTEGRATING ORAL
Status: CANCELLED | OUTPATIENT
Start: 2018-06-12

## 2018-05-29 RX ORDER — ONDANSETRON 8 MG/1
8 TABLET, ORALLY DISINTEGRATING ORAL
Status: CANCELLED | OUTPATIENT
Start: 2018-05-29

## 2018-05-29 RX ORDER — DEXTROSE MONOHYDRATE 50 MG/ML
INJECTION, SOLUTION INTRAVENOUS CONTINUOUS
Status: CANCELLED | OUTPATIENT
Start: 2018-05-29

## 2018-05-29 RX ORDER — ONDANSETRON 2 MG/ML
4 INJECTION INTRAMUSCULAR; INTRAVENOUS
Status: CANCELLED | OUTPATIENT
Start: 2018-05-29

## 2018-05-29 RX ORDER — LIDOCAINE HYDROCHLORIDE 10 MG/ML
10 INJECTION, SOLUTION INFILTRATION; PERINEURAL ONCE
Status: CANCELLED | OUTPATIENT
Start: 2018-06-12 | End: 2018-06-12

## 2018-05-29 RX ORDER — PROCHLORPERAZINE MALEATE 10 MG
10 TABLET ORAL EVERY 6 HOURS PRN
Status: CANCELLED | OUTPATIENT
Start: 2018-06-12

## 2018-05-29 RX ORDER — PROCHLORPERAZINE MALEATE 10 MG
10 TABLET ORAL EVERY 6 HOURS PRN
Status: CANCELLED | OUTPATIENT
Start: 2018-05-29

## 2018-05-29 RX ORDER — ONDANSETRON 2 MG/ML
4 INJECTION INTRAMUSCULAR; INTRAVENOUS
Status: CANCELLED | OUTPATIENT
Start: 2018-06-12

## 2018-05-29 RX ORDER — LIDOCAINE HYDROCHLORIDE 10 MG/ML
10 INJECTION, SOLUTION INFILTRATION; PERINEURAL ONCE
Status: CANCELLED | OUTPATIENT
Start: 2018-05-29 | End: 2018-05-29

## 2018-05-29 RX ORDER — DEXAMETHASONE SODIUM PHOSPHATE 4 MG/ML
8 INJECTION, SOLUTION INTRA-ARTICULAR; INTRALESIONAL; INTRAMUSCULAR; INTRAVENOUS; SOFT TISSUE ONCE
Status: COMPLETED | OUTPATIENT
Start: 2018-05-29 | End: 2018-05-29

## 2018-05-29 RX ORDER — DEXAMETHASONE SODIUM PHOSPHATE 4 MG/ML
8 INJECTION, SOLUTION INTRA-ARTICULAR; INTRALESIONAL; INTRAMUSCULAR; INTRAVENOUS; SOFT TISSUE ONCE
Status: CANCELLED | OUTPATIENT
Start: 2018-05-29 | End: 2018-05-29

## 2018-05-29 RX ORDER — DEXTROSE MONOHYDRATE 50 MG/ML
INJECTION, SOLUTION INTRAVENOUS CONTINUOUS
Status: CANCELLED | OUTPATIENT
Start: 2018-06-12

## 2018-05-29 RX ORDER — DEXAMETHASONE SODIUM PHOSPHATE 4 MG/ML
8 INJECTION, SOLUTION INTRA-ARTICULAR; INTRALESIONAL; INTRAMUSCULAR; INTRAVENOUS; SOFT TISSUE ONCE
Status: CANCELLED | OUTPATIENT
Start: 2018-06-12 | End: 2018-06-12

## 2018-05-29 RX ADMIN — FLUOROURACIL 3590 MG: 50 INJECTION, SOLUTION INTRAVENOUS at 11:08

## 2018-05-29 RX ADMIN — DEXAMETHASONE SODIUM PHOSPHATE 8 MG: 4 INJECTION, SOLUTION INTRAMUSCULAR; INTRAVENOUS at 10:26

## 2018-05-29 RX ADMIN — SODIUM CHLORIDE, PRESERVATIVE FREE 500 UNITS: 5 INJECTION INTRAVENOUS at 08:46

## 2018-05-29 ASSESSMENT — PATIENT HEALTH QUESTIONNAIRE - PHQ9: CLINICAL INTERPRETATION OF PHQ2 SCORE: 0

## 2018-05-29 ASSESSMENT — PAIN SCALES - GENERAL
PAINLEVEL_OUTOF10: 0
PAINLEVEL: NO PAIN
PAINLEVEL: NO PAIN

## 2018-05-29 NOTE — PROGRESS NOTES
Pt arrived to [facility] for port access and lab draw prior to chemo. Pt with EMLA applied at home. EMLA wiped from site and accessed in sterile field. Labs drawn as ordered. Hypoallergenic dressing placed in addition to skin prep. Pt seeing MD this am, knows when to return. Pt discharged under self care in no apparent distress.

## 2018-05-29 NOTE — PROGRESS NOTES
Date of visit: 5/29/2018  9:36 AM      Chief Complaint- Metastatic rectal cancer to liver, for follow-up.        Identification/Prior relevant history:   Originally diagnosed with a rectal carcinoma in 2012 secondary to bleeding. s/pt laparoscopic resection with primary anastomosis and final pathology was consistent with well-differentiated adenocarcinoma with 0/30 nodes. She was staged pT2 N0. 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      8/2015 she was diagnosed with metastatic disease to the liver with a 2.4 cm mass as well as small pulmonary nodules and thyroid nodules. She underwent an ablative therapy to the liver. She was then started on Xelox. She had  allergic reaction to oxaliplatin and was continued on Xeloda. This eventually led to increased diarrhea and her regimen was changed to single agent 5-FU. She has been tolerating the therapy well with good responses on imaging.      3/2017- CT of the chest abdomen pelvis which has been stable and no evidence of disease. CT of the wrist was consistent with tenosynovitis and degenerative changes.     5/17-established care with me   EGD and colonoscopy  did not reveal any local recurrence. She had some gastritis.   - CEA has trended up to 13  -PET scan isolated hepatic lesion. Patchy basilar pulmonary opacities. Seen by Dr. West referred for Y90 embolization  6/17- Y90 Theraspheres.  Continued on 5-FU.  -CEA trending down/normalized     -CT abdomen and-3/9/18-Oval-shaped low attenuation lesion in segment 8 of the liver is again identified which appears smaller than the prior exam. No enhancement within or around this lesion is appreciated. Findings are consistent with posttreatment changes in this portion of the liver after Y 90 procedure.    Interim history- -Restarted 5-FU maintenance, she had a transient chest pain. She was put on  Norvasc. After few infusions. Her chest pain is not bothering her that much. She has seen cardiology. However, there is no mention of chest pain episodes in the cardiology notes.      Past Medical History:      Past Medical History:   Diagnosis Date   • Arrhythmia    • Blood clotting disorder (HCC) 08/2015    bilat PE    • Blood transfusion 1957   • Cancer (HCC) 09/2012    rectal cancer,  Liver CA-2015   • CATARACT     removed b/l   • Chemotherapy-induced neutropenia (HCC) 9/28/2016   • Chickenpox    • Colon cancer (HCC)    • Dental disorder     dentures UPPERS AND LOWERS   • Elevated alkaline phosphatase level 11/15/2017   • Fall    • English measles    • Hemorrhagic disorder (HCC)     on Xarelto    • Hypercholesteremia    • Hypertension     no meds currently    • Ileostomy in place (HCC)    • Indigestion    • Influenza    • Lightheadedness 9/17/2015   • Mumps    • Obstruction     ileostomy   • Other specified symptom associated with female genital organs     HYSTERECTOMY 1993   • Pneumonia 05/2017    tx'd with antibx   • Pulmonary embolism (HCC)    • 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)    • Renal insufficiency 3/14/2016   • Routine general medical examination at a health care facility 3/14/2016    3/14/16   • Seasonal allergies    • Tonsillitis        Past surgical history:       Past Surgical History:   Procedure Laterality Date   • COLONOSCOPY WITH BIOPSY  8/23/2015    Procedure: COLONOSCOPY WITH BIOPSY;  Surgeon: Wilbur Glasgow M.D.;  Location: ENDOSCOPY [facility];  Service:    • HEPATIC ABLATION LAPAROSCOPIC  7/6/2015    Procedure: HEPATIC ABLATION LAPAROSCOPIC.;  Surgeon: Kit West M.D.;  Location: SURGERY [facility];  Service:    • CATH PLACEMENT Left 7/6/2015    Procedure: CATH PLACEMENT CEPHALIC POWERPORT;  Surgeon: Kit West M.D.;  Location: SURGERY [facility];  Service:    • FISTULA IN ANO REPAIR  1/28/2015    Performed by Kolton Montgomery M.D. at  SURGERY [facility]   • PERINEAL PROCEDURE  1/28/2015    Performed by Kolton Montgomery M.D. at SURGERY [facility]   • FISTULA IN ANO REPAIR  10/15/2014    Performed by Kolton Montgomery M.D. at SURGERY [facility]   • PERINEAL PROCEDURE  10/15/2014    Performed by Kolton Montgomery M.D. at SURGERY [facility]   • IRRIGATION & DEBRIDEMENT GENERAL  2/19/2014    Performed by Kolton Montgomery M.D. at SURGERY [facility]   • FLAP GRAFT  2/19/2014    Performed by Kolton Montgomery M.D. at SURGERY [facility]   • PERINEAL PROCEDURE  12/18/2013    Performed by Kolton Montgomery M.D. at [facility]   • MYOCUTANEOUS FLAP  12/18/2013    Performed by Kolton Montgomery M.D. at [facility]   • IRRIGATION & DEBRIDEMENT GENERAL  10/23/2013    Performed by Kolton Montgomery M.D. at SURGERY [facility]   • FISTULA IN ANO REPAIR  9/4/2013    Performed by Kolton Montgomery M.D. at SURGERY [facility]   • FLAP ROTATION  9/4/2013    Performed by Kolton Montgomery M.D. at [facility]   • RECOVERY  8/26/2013    Performed by Cath-Recovery Surgery at [facility] SAME DAY [facility]   • PROCTOSCOPY  7/17/2013    Performed by Kolton Montgomery M.D. at [facility]   • BIOPSY GENERAL  7/17/2013    Performed by Kolton Montgomery M.D. at [facility]   • SIGMOIDOSCOPY  2/27/2013    Performed by Kolton Montgomery M.D. at SURGERY [facility]   • FISTULA IN ANO REPAIR  2/27/2013    Performed by Kolton Montgomery M.D. at [facility]   • LAPAROSCOPY  10/8/2012    Performed by Kolton Montgomery M.D. at [facility]   • ILEO LOOP DIVERSION  10/8/2012    Performed by Kolton Montgomery M.D. at [facility]   • COLECTOMY  9/26/2012    Performed by Kolton Montgomery M.D. at SURGERY [facility]   • COLONOSCOPY FLEX W/ENDO US  9/20/2012    Performed by Harshil Bolton M.D. at SURGERY [facility]   • OTHER  2009    left eye torn retina repair   • CATARACT EXTRACTION WITH IOL  2004     bilateral   • ABDOMINAL HYSTERECTOMY TOTAL  1983   • CYST EXCISION  1972    ovarian   • COLON RESECTION     • EYE SURGERY      cataracts   • HYSTERECTOMY LAPAROSCOPY     • PB REMV 2ND CATARACT,CORN-SCLER SECTN     • TONSILLECTOMY         Allergies:       Codeine; Oxaliplatin; Pcn [penicillins]; Sulfa drugs; and Tape    Medications:         Current Outpatient Prescriptions   Medication Sig Dispense Refill   • potassium chloride ER (KLOR-CON) 10 MEQ tablet TAKE ONE TABLET EVERY DAY AS NEEDED 30 Tab 6   • amLODIPine (NORVASC) 5 MG Tab Take 1 tab 5 days around chemo q 2 weeks 30 Tab 0   • nitroglycerin (NITROSTAT) 0.4 MG SL Tab Place 1 Tab under tongue as needed for Chest Pain. 25 Tab 0   • loperamide (IMODIUM) 2 MG Cap Take 2 mg by mouth 4 times a day as needed for Diarrhea.     • raNITidine (ZANTAC) 150 MG Tab TAKE 1 TABLET 2 TIMES A DAY 60 Tab 3   • rivaroxaban (XARELTO) 10 MG Tab tablet Take 1 Tab by mouth every day. 30 Tab 6   • lidocaine-prilocaine (EMLA) 2.5-2.5 % Cream APPLY A THICK FILM OVER THE PORT ACCESS SITE PRIOR TO ACCESS 30 g 2   • oxycodone immediate-release (ROXICODONE) 5 MG Tab Take 1 Tab by mouth every four hours as needed for Severe Pain. 30 Tab 0   • Tiotropium Bromide-Olodaterol (STIOLTO RESPIMAT) 2.5-2.5 MCG/ACT Aero Soln Take 2 Puffs by mouth every day. 1 Inhaler 11   • albuterol (PROAIR HFA) 108 (90 Base) MCG/ACT Aero Soln inhalation aerosol Inhale 2 Puffs by mouth every four hours as needed for Shortness of Breath (wheezing). 1 Inhaler 11   • furosemide (LASIX) 40 MG Tab Take 1 Tab by mouth every day. 30 Tab 11   • ondansetron (ZOFRAN) 4 MG Tab tablet      • cyanocobalamin (VITAMIN B-12) 500 MCG Tab Take 500 mcg by mouth every day.     • Multiple Vitamins-Minerals (CENTRUM SILVER PO) Take  by mouth.     • Cholecalciferol (VITAMIN D3) 400 UNITS CAPS Take 1 Cap by mouth every day.     • loratadine (CLARITIN) 10 MG TABS Take 10 mg by mouth every day. Indications: Hayfever       Current  Facility-Administered Medications   Medication Dose Route Frequency Provider Last Rate Last Dose   • heparin pf injection 500 Units  500 Units Intracatheter PRN Eric Hardy M.D.   500 Units at 03/09/18 1542   • heparin pf injection 500 Units  500 Units Intracatheter PRN ARTURO Krishnan   500 Units at 08/22/17 0856         Social History:     Social History     Social History   • Marital status:      Spouse name: N/A   • Number of children: N/A   • Years of education: N/A     Occupational History   • Not on file.     Social History Main Topics   • Smoking status: Never Smoker   • Smokeless tobacco: Never Used   • Alcohol use No      Comment: rare   • Drug use: No   • Sexual activity: No     Other Topics Concern   • Not on file     Social History Narrative   • No narrative on file       Family History:      Family History   Problem Relation Age of Onset   • Heart Disease Mother    • Heart Failure Mother    • Hypertension Mother    • Hyperlipidemia Mother    • Cancer Mother    • Cancer Maternal Aunt 60     breast ca   • Lung Disease Brother      COPD/Emphysema/Smoker   • Cancer Brother      prostate cancer   • Alcohol/Drug Brother      Alcohol   • Kidney Disease Father      renal failure       Review of Systems:  All other review of systems are negative except what was mentioned above in the HPI.    Constitutional: Negative for fever, chills, weight loss and malaise/fatigue.    HEENT: No new auditory or visual complaints. No sore throat and neck pain.     Respiratory: Negative for cough, sputum production, shortness of breath and wheezing.    Cardiovascular: Negative for persistent chest pain, chest discomfort after a few information as above, denies palpitations, orthopnea and leg swelling.    Gastrointestinal: Negative for heartburn, nausea, vomiting and abdominal pain.    Genitourinary: Negative for dysuria, hematuria    Musculoskeletal: No new arthralgias or myalgias   Skin: Negative for rash  "and itching.    Neurological: Negative for focal weakness and headaches.    Endo/Heme/Allergies: No abnormal bleed/bruise.    Psychiatric/Behavioral: No new depression/anxiety.    Physical Exam:  Vitals: /80   Pulse (!) 109   Temp 36.6 °C (97.9 °F)   Resp 18   Ht 1.651 m (5' 5\")   Wt 75.5 kg (166 lb 8.9 oz)   SpO2 98%   BMI 27.72 kg/m²     General: Not in acute distress, alert and oriented x 3  HEENT: No pallor, icterus. Oropharynx clear.   Neck: Supple, no palpable masses.  Lymph nodes: No palpable cervical, supraclavicular, axillary or inguinal lymphadenopathy.    CVS: regular rate and rhythm, no rubs or gallops  RESP: Clear to auscultate bilaterally, no wheezing or crackles.   ABD: Soft, non tender, non distended, positive bowel sounds, no palpable organomegaly  EXT: No edema or cyanosis  CNS: Alert and oriented x3, No focal deficits.  Skin- No rash      Labs:   Outpatient Infusion Services on 05/29/2018   Component Date Value Ref Range Status   • WBC 05/29/2018 4.8  4.8 - 10.8 K/uL Final   • RBC 05/29/2018 4.77  4.20 - 5.40 M/uL Final   • Hemoglobin 05/29/2018 12.7  12.0 - 16.0 g/dL Final   • Hematocrit 05/29/2018 40.5  37.0 - 47.0 % Final   • MCV 05/29/2018 84.9  81.4 - 97.8 fL Final   • MCH 05/29/2018 26.6* 27.0 - 33.0 pg Final   • MCHC 05/29/2018 31.4* 33.6 - 35.0 g/dL Final   • RDW 05/29/2018 65.1* 35.9 - 50.0 fL Final   • Platelet Count 05/29/2018 225  164 - 446 K/uL Final   • MPV 05/29/2018 10.3  9.0 - 12.9 fL Final   • Nucleated RBC 05/29/2018 0.00  /100 WBC Final   • NRBC (Absolute) 05/29/2018 0.00  K/uL Final   • Neutrophils-Polys 05/29/2018 49.70  44.00 - 72.00 % Final   • Lymphocytes 05/29/2018 31.90  22.00 - 41.00 % Final   • Monocytes 05/29/2018 13.50* 0.00 - 13.40 % Final   • Eosinophils 05/29/2018 3.50  0.00 - 6.90 % Final   • Basophils 05/29/2018 1.00  0.00 - 1.80 % Final   • Immature Granulocytes 05/29/2018 0.40  0.00 - 0.90 % Final   • Lymphs (Absolute) 05/29/2018 1.54  1.00 - 4.80 " K/uL Final   • Monos (Absolute) 05/29/2018 0.65  0.00 - 0.85 K/uL Final   • Eos (Absolute) 05/29/2018 0.17  0.00 - 0.51 K/uL Final   • Baso (Absolute) 05/29/2018 0.05  0.00 - 0.12 K/uL Final   • Immature Granulocytes (abs) 05/29/2018 0.02  0.00 - 0.11 K/uL Final   • Neutrophils (Absolute) 05/29/2018 2.40  2.00 - 7.15 K/uL Final    Includes immature neutrophils, if present.   • Anisocytosis 05/29/2018 1+   Final   • Macrocytosis 05/29/2018 1+   Final   • Microcytosis 05/29/2018 1+   Final   • Sodium 05/29/2018 140  135 - 145 mmol/L Final   • Potassium 05/29/2018 3.6  3.6 - 5.5 mmol/L Final   • Chloride 05/29/2018 106  96 - 112 mmol/L Final   • Co2 05/29/2018 22  20 - 33 mmol/L Final   • Anion Gap 05/29/2018 12.0* 0.0 - 11.9 Final   • Glucose 05/29/2018 114* 65 - 99 mg/dL Final   • Bun 05/29/2018 23* 8 - 22 mg/dL Final   • Creatinine 05/29/2018 1.16  0.50 - 1.40 mg/dL Final   • Calcium 05/29/2018 10.0  8.5 - 10.5 mg/dL Final   • AST(SGOT) 05/29/2018 33  12 - 45 U/L Final   • ALT(SGPT) 05/29/2018 28  2 - 50 U/L Final   • Alkaline Phosphatase 05/29/2018 207* 30 - 99 U/L Final   • Total Bilirubin 05/29/2018 0.9  0.1 - 1.5 mg/dL Final   • Albumin 05/29/2018 4.0  3.2 - 4.9 g/dL Final   • Total Protein 05/29/2018 6.8  6.0 - 8.2 g/dL Final   • Globulin 05/29/2018 2.8  1.9 - 3.5 g/dL Final   • A-G Ratio 05/29/2018 1.4  g/dL Final   • GFR If  05/29/2018 55* >60 mL/min/1.73 m 2 Final   • GFR If Non  05/29/2018 45* >60 mL/min/1.73 m 2 Final   • Peripheral Smear Review 05/29/2018 see below   Final    Comment: Due to instrument suspect flags, further review of peripheral smear is  indicated on this patient sample. This review may or may not result in  abnormal findings.     • Plt Estimation 05/29/2018 Normal   Final   • RBC Morphology 05/29/2018 Present   Final   • Comments-Diff 05/29/2018 see below   Final    Results have been verified by peripheral blood smear review.             Assessment and  Plan:   Metastatic rectal cancer, KRAS positive: She had a rather indolent course, but had isolated relapse in the liver. She is status post Y90 theraspheres with normalization of her CEA level. Posttreatment images are consistent with treated metastatic lesion. Sheresumed  the 5-FU infusion after a short break.. We will continue this until progression. She is reluctant to consider Xeloda, which apparently caused some intolerance before. We will continue 5-FU and she will come to the clinic every alternate treatment, every 4 weeks.    . We will monitor her CEA levels and decide on timing for next imaging.    ? 9-YS-pbfjjky vasospasm- this is improved after a few doses of 5-FU.    Exertional dyspnea-CT of the chest, grossly unremarkable. She feels better with inhalers. She will follow up with pulmonary. Agree with exercise recommendation.     3 History of pulmonary embolus: He was diagnosed in 8/2015 and was initially treated with Coumadin then changed to Xarelto. She had completed 6 months of therapy but was continued on anticoagulation secondary to underlying malignancy.. She did have significant clot burden and pulmonary infarction at the time of presentation.. She recently had some bleeding around the stoma. It would be okay for her to reduce the Eliquis to 2.5 mg twice a day.     4 Anal repair and reconstruction: Patient was seen by Dr. Montgomery and has had multiple surgeries to this area. Further surgery has been deferred. She continues to follow-up with Dr. Montgomery periodically    She agreed and verbalized  agreement and understanding with the current plan.  I answered all questions and concerns at this time         Please note that this dictation was created using voice recognition software. I have made every reasonable attempt to correct obvious errors, but I expect that there are errors of grammar and possibly content that I did not discover before finalizing the note.      SIGNATURES:  Eric  Hayley    CC:  Manan Quiñonez M.D.  No ref. provider found

## 2018-05-29 NOTE — PROGRESS NOTES
Chemotherapy Verification - PRIMARY RN      Height = 166 cm  Weight = 75.6 kg  BSA = 1.87 m^2       Medication: fluorouracil  Dose: 1920 mg/m^2  Calculated Dose: 3590.4 mg over 46 hours                             (In mg/m2, AUC, mg/kg)     I confirm this process was performed independently with the BSA and all final chemotherapy dosing calculations congruent.  Any discrepancies of 5% or greater have been addressed with the chemotherapy pharmacist. The resolution of the discrepancy has been documented in the EPIC progress notes.

## 2018-05-29 NOTE — PROGRESS NOTES
"Pharmacy Chemotherapy Verification Note:    Patient Name: Karlene Walters      Dx: Colon CA        Cycle 60   Previous treatment: C59  5/15/18    Protocol: 5-FU+Leucovorin       IV over 2 hours on Day 1, followed by   IVP on Day 1, followed by    - Dose reduced by 20% to 320 mg/m² starting with Cycle 18 (Parish #11) on 7/5/16 due to neutropenia    10/31/17: delete Leucovorin and 5-FU push d/t neutropenia per Dr. Hardy   Fluorouracil 1200 mg/m²  IV continuous infusion daily on Days 1 and 2 (2400 mg/m² IV over 46-48 hours) -    - Infusion Dose reduced by 20% starting with Cycle 18 (Parish #11) on 7/5/16 due to neutropenia   -20% reduction (1920 mg/m2) to be continued starting C28 per C Alsop APRN   14 day cycles for 12 cycles (adjuvant) or until disease progression or unacceptable toxicity  NCCN Guidelines for Colon Cancer. V.2.2015.  Jay T, et al. Eur J Cancer.1999;35(9):1343-7.      Allergies:  Codeine; Oxaliplatin; Pcn; Sulfa drugs; and Tape     /78   Pulse (!) 104   Temp 36.3 °C (97.4 °F)   Resp 18   Ht 1.66 m (5' 5.35\")   Wt 75.6 kg (166 lb 10.7 oz)   SpO2 100%   BMI 27.44 kg/m²    Body surface area is 1.87 meters squared.     ANC~ 2400 Plt = 225k   Hgb = 12.7     SCr = 1.16mg/dL CrCl ~ 49mL/min   LFT's = WNl, except AP = 207 TBili = 0.9        Fluorouracil (5-FU) 1920 mg/m² x  1.87m² = 3590.4mg              <5% difference, OK to treat with final dose = 3590mg    CIVI over 46 hours Via home infusion pump at 1.6ml/hr                 Nakia Arroyo, PharmD    "

## 2018-05-29 NOTE — PROGRESS NOTES
"Pharmacy Chemotherapy Verification Note:    Patient Name: Karlene Walters      Dx: Colon CA        Protocol: 5-FU + Leucovorin  *Dosing Reference*       IV over 2 hours on Day 1, followed by    IVP on Day 1, followed by         -- 10/31/17: delete Leucovorin and 5-FU push d/t neutropenia per Dr. Hardy   Fluorouracil   IV continuous infusion daily on Days 1 and 2 ( IV over 46-48 hours)        -- 20% reduction (1920 mg/m2) to be continued starting C28 per C Alsop APRN   14 day cycles for 12 cycles (adjuvant) or until disease progression or unacceptable toxicity   NCCN Guidelines for Colon Cancer. V.2.2015.  Jay T, et al. Eur J Cancer.1999;35(9):1343-7.      Allergies:  Codeine; Oxaliplatin; Pcn; Sulfa drugs; and Tape       /78   Pulse (!) 104   Temp 36.3 °C (97.4 °F)   Resp 18   Ht 1.66 m (5' 5.35\")   Wt 75.6 kg (166 lb 10.7 oz)   SpO2 100%   BMI 27.44 kg/m²  Body surface area is 1.87 meters squared.    Labs 5/29/18:  ANC~ 2400 Plt = 225k   Hgb = 12.7   SCr = 1.16 mg/dL CrCl ~ 48 mL/min  AST/ALT/AP = 33/28/207 TBili = 0.9        Drug Order   (Drug name, dose, route, IV Fluid & volume, frequency, number of doses) Cycle 60 (C52 in [place])  Previous treatment: C59 = 05/15/18     Medication = Fluorouracil (5-FU)   Base Dose = 1920 mg/m²  Calc Dose:Base Dose x 1.87 m² = 3590 mg  Final Dose = 3590 mg   Route = CIVI  Drug Conc = 50 mg/mL  Fluid & Volume = 71.8 mL (+ 3 mL overfill = 74.8 mL)  Admin Duration = CIVI over 46 hrs @ 1.6 mL/hr   Via CADD pump for home infusion        <5% difference, ok to treat with final dose     By my signature below, I confirm this process was performed independently with the BSA and all final chemotherapy dosing calculations congruent. I have reviewed the above chemotherapy order and that my calculation of the final dose and BSA (when applicable) corroborate those calculations of the  pharmacist.       Lalit Andrade, PharmD, BCOP    "

## 2018-05-29 NOTE — PROGRESS NOTES
Paitient here for CADD home pump connect. Port previously accessed; brisk blood return noted. Labs drawn earlier today and reviewed today. Patient connected to 5FU CADD pump running at 1.5 ml/hr. Total volume with overfill = 73.4 ml. Minimum to infuse = 71.9 ml. Next appointment scheduled. Discharged to self care; no apparent distress noted.

## 2018-05-29 NOTE — PROGRESS NOTES
Chemotherapy Verification - SECONDARY RN       Height = 166 cm  Weight = 75.6 kg  BSA = 1.87 m2       Medication: Fluorouracil  Dose: 1,920 mg/m2  Calculated Dose: 3,590.4 mg                             (In mg/m2, AUC, mg/kg)       I confirm that this process was performed independently.

## 2018-05-31 ENCOUNTER — OUTPATIENT INFUSION SERVICES (OUTPATIENT)
Dept: ONCOLOGY | Facility: MEDICAL CENTER | Age: 78
End: 2018-05-31
Attending: INTERNAL MEDICINE
Payer: MEDICARE

## 2018-05-31 VITALS
HEIGHT: 65 IN | OXYGEN SATURATION: 100 % | BODY MASS INDEX: 28.06 KG/M2 | TEMPERATURE: 98.5 F | SYSTOLIC BLOOD PRESSURE: 116 MMHG | DIASTOLIC BLOOD PRESSURE: 64 MMHG | WEIGHT: 168.43 LBS | HEART RATE: 107 BPM | RESPIRATION RATE: 18 BRPM

## 2018-05-31 DIAGNOSIS — R94.4 DECREASED GFR: ICD-10-CM

## 2018-05-31 DIAGNOSIS — C20 RECTAL CANCER (HCC): ICD-10-CM

## 2018-05-31 PROCEDURE — 96523 IRRIG DRUG DELIVERY DEVICE: CPT

## 2018-05-31 PROCEDURE — 700111 HCHG RX REV CODE 636 W/ 250 OVERRIDE (IP)

## 2018-05-31 RX ADMIN — SODIUM CHLORIDE, PRESERVATIVE FREE 500 UNITS: 5 INJECTION INTRAVENOUS at 11:27

## 2018-05-31 ASSESSMENT — PAIN SCALES - GENERAL: PAINLEVEL_OUTOF10: 0

## 2018-05-31 NOTE — PROGRESS NOTES
Patient arrived for CADD pump de-access. Confirmed 0ml remaining in pump.  Disconnected pump; port flushed and patent.  Heparin locked and de-accessed port.  Confirmed next appt in two weeks. Discharged home in good spirits under no apparent distress.

## 2018-06-04 DIAGNOSIS — C18.9 COLON CANCER METASTASIZED TO LIVER (HCC): Primary | ICD-10-CM

## 2018-06-04 DIAGNOSIS — C78.7 COLON CANCER METASTASIZED TO LIVER (HCC): Primary | ICD-10-CM

## 2018-06-07 PROCEDURE — 0296T PR EXT ECG > 48HR TO 21 DAY RCRD W/CONECT INTL RCRD: CPT | Performed by: INTERNAL MEDICINE

## 2018-06-07 PROCEDURE — 0298T PR EXT ECG > 48HR TO 21 DAY REVIEW AND INTERPRETATN: CPT | Performed by: INTERNAL MEDICINE

## 2018-06-12 ENCOUNTER — OUTPATIENT INFUSION SERVICES (OUTPATIENT)
Dept: ONCOLOGY | Facility: MEDICAL CENTER | Age: 78
End: 2018-06-12
Attending: INTERNAL MEDICINE
Payer: MEDICARE

## 2018-06-12 VITALS
BODY MASS INDEX: 28.03 KG/M2 | TEMPERATURE: 98 F | WEIGHT: 168.21 LBS | HEART RATE: 106 BPM | OXYGEN SATURATION: 96 % | DIASTOLIC BLOOD PRESSURE: 80 MMHG | HEIGHT: 65 IN | RESPIRATION RATE: 18 BRPM | SYSTOLIC BLOOD PRESSURE: 137 MMHG

## 2018-06-12 DIAGNOSIS — R94.4 DECREASED GFR: ICD-10-CM

## 2018-06-12 DIAGNOSIS — C20 RECTAL CANCER (HCC): ICD-10-CM

## 2018-06-12 DIAGNOSIS — Z51.11 ENCOUNTER FOR ANTINEOPLASTIC CHEMOTHERAPY: ICD-10-CM

## 2018-06-12 LAB
BASOPHILS # BLD AUTO: 0.9 % (ref 0–1.8)
BASOPHILS # BLD: 0.04 K/UL (ref 0–0.12)
EOSINOPHIL # BLD AUTO: 0.17 K/UL (ref 0–0.51)
EOSINOPHIL NFR BLD: 3.8 % (ref 0–6.9)
ERYTHROCYTE [DISTWIDTH] IN BLOOD BY AUTOMATED COUNT: 65.1 FL (ref 35.9–50)
HCT VFR BLD AUTO: 39.2 % (ref 37–47)
HGB BLD-MCNC: 12.3 G/DL (ref 12–16)
IMM GRANULOCYTES # BLD AUTO: 0.02 K/UL (ref 0–0.11)
IMM GRANULOCYTES NFR BLD AUTO: 0.4 % (ref 0–0.9)
LYMPHOCYTES # BLD AUTO: 1.11 K/UL (ref 1–4.8)
LYMPHOCYTES NFR BLD: 24.8 % (ref 22–41)
MCH RBC QN AUTO: 26.5 PG (ref 27–33)
MCHC RBC AUTO-ENTMCNC: 31.4 G/DL (ref 33.6–35)
MCV RBC AUTO: 84.3 FL (ref 81.4–97.8)
MONOCYTES # BLD AUTO: 0.67 K/UL (ref 0–0.85)
MONOCYTES NFR BLD AUTO: 15 % (ref 0–13.4)
NEUTROPHILS # BLD AUTO: 2.47 K/UL (ref 2–7.15)
NEUTROPHILS NFR BLD: 55.1 % (ref 44–72)
NRBC # BLD AUTO: 0 K/UL
NRBC BLD-RTO: 0 /100 WBC
PLATELET # BLD AUTO: 234 K/UL (ref 164–446)
PMV BLD AUTO: 10.3 FL (ref 9–12.9)
RBC # BLD AUTO: 4.65 M/UL (ref 4.2–5.4)
WBC # BLD AUTO: 4.5 K/UL (ref 4.8–10.8)

## 2018-06-12 PROCEDURE — A4212 NON CORING NEEDLE OR STYLET: HCPCS

## 2018-06-12 PROCEDURE — 700111 HCHG RX REV CODE 636 W/ 250 OVERRIDE (IP): Performed by: INTERNAL MEDICINE

## 2018-06-12 PROCEDURE — 700111 HCHG RX REV CODE 636 W/ 250 OVERRIDE (IP)

## 2018-06-12 PROCEDURE — G0498 CHEMO EXTEND IV INFUS W/PUMP: HCPCS

## 2018-06-12 PROCEDURE — 85025 COMPLETE CBC W/AUTO DIFF WBC: CPT

## 2018-06-12 PROCEDURE — 96374 THER/PROPH/DIAG INJ IV PUSH: CPT | Mod: XU

## 2018-06-12 PROCEDURE — 96375 TX/PRO/DX INJ NEW DRUG ADDON: CPT

## 2018-06-12 RX ORDER — DEXAMETHASONE SODIUM PHOSPHATE 4 MG/ML
8 INJECTION, SOLUTION INTRA-ARTICULAR; INTRALESIONAL; INTRAMUSCULAR; INTRAVENOUS; SOFT TISSUE ONCE
Status: COMPLETED | OUTPATIENT
Start: 2018-06-12 | End: 2018-06-12

## 2018-06-12 RX ADMIN — DEXAMETHASONE SODIUM PHOSPHATE 8 MG: 4 INJECTION, SOLUTION INTRAMUSCULAR; INTRAVENOUS at 11:19

## 2018-06-12 RX ADMIN — FLUOROURACIL 3610 MG: 50 INJECTION, SOLUTION INTRAVENOUS at 12:39

## 2018-06-12 ASSESSMENT — PAIN SCALES - GENERAL: PAINLEVEL_OUTOF10: 0

## 2018-06-12 NOTE — PROGRESS NOTES
"Chemotherapy Verification - SECONDARY RN       Height = 65.35\"  Weight = 76.3 kg  BSA = 1.87 m2       Medication: Fluorouracil Home Pump  Dose: 1920 mg/m2  Calculated Dose: 3590.4 mg over 46 hrs                             I confirm that this process was performed independently.   "

## 2018-06-12 NOTE — PROGRESS NOTES
Chemotherapy Verification - PRIMARY RN      Height = 1.66 m  Weight = 76.3 kg  BSA = 1.876 m2       Medication: 5-flurouracil  Dose: 1,920 mg/m2  Calculated Dose: 3,601.92 mg over 46 hours via CADD pump                             (In mg/m2, AUC, mg/kg)       I confirm this process was performed independently with the BSA and all final chemotherapy dosing calculations congruent.  Any discrepancies of 5% or greater have been addressed with the chemotherapy pharmacist. The resolution of the discrepancy has been documented in the EPIC progress notes.

## 2018-06-12 NOTE — PROGRESS NOTES
Patient in for C53 5-FU home infusion,  at chair side. Patient voices no concerns at this time; denies any signs/sx of active infection. Left chest wall port accessed using sterile technique, CBC drawn and sent for processing; results reviewed and within parameters for today's infusion. Dexamethasone administered via IVP; CADD pump connected (72.2 mL 5-FU, 73.5 mL with overfill) and set to RUN. Patient to return 6/14/18 at 1330 for pump disconnect; discharged ambulatory, in no apparent distress.

## 2018-06-12 NOTE — PROGRESS NOTES
"Pharmacy Chemotherapy Verification Note:    Patient Name: Karlene Walters      Dx: Colon CA        Protocol: 5-FU + Leucovorin  *Dosing Reference*       IV over 2 hours on Day 1, followed by    IVP on Day 1, followed by         -- 10/31/17: delete Leucovorin and 5-FU push d/t neutropenia per Dr. Hardy   Fluorouracil   IV continuous infusion daily on Days 1 and 2 ( IV over 46-48 hours)        -- 20% reduction (1920 mg/m2) to be continued starting C28 per C Alsop APRN   14 day cycles for 12 cycles (adjuvant) or until disease progression or unacceptable toxicity   NCCN Guidelines for Colon Cancer. V.2.2015.  Jay T, et al. Eur J Cancer.1999;35(9):1343-7.      Allergies:  Codeine; Oxaliplatin; Pcn; Sulfa drugs; and Tape       /80   Pulse (!) 106   Temp 36.7 °C (98 °F)   Resp 18   Ht 1.66 m (5' 5.35\")   Wt 76.3 kg (168 lb 3.4 oz)   SpO2 96%   BMI 27.69 kg/m²  Body surface area is 1.88 meters squared.    6/12/18-  ANC~ 2470 Plt = 234k   Hgb = 12.3       05/29/18- CMP on even cycles per TP  SCr = 1.16mg/dL         CrCl ~ 49mL/min         LFT's = WNl, except AP = 207           TBili = 0.9          Drug Order   (Drug name, dose, route, IV Fluid & volume, frequency, number of doses) Cycle 61 (C53 in [place])  Previous treatment: C60 = 5/29/18     Medication = Fluorouracil (5-FU)   Base Dose = 1920 mg/m²  Calc Dose:Base Dose x 1.88m² = 3609mg  Final Dose = 3610mg   Route = CIVI  Drug Conc = 50 mg/mL  Fluid & Volume = 72.2mL (+ 3 mL overfill)  Admin Duration = CIVI over 46 hrs @ 1.6 mL/hr   Via CADD pump for home infusion        <5% difference, ok to treat with final dose     By my signature below, I confirm this process was performed independently with the BSA and all final chemotherapy dosing calculations congruent. I have reviewed the above chemotherapy order and that my calculation of the final dose and BSA (when applicable) corroborate those calculations of the  pharmacist.       Nakia SHEN" Ely Arroyo

## 2018-06-12 NOTE — PROGRESS NOTES
"Pharmacy Chemotherapy Verification Note:    Patient Name: Karlene Walters      Dx: Colon CA        Cycle 61   Previous treatment: C60 = 5/29/18    Protocol: 5-FU+Leucovorin       IV over 2 hours on Day 1, followed by   IVP on Day 1, followed by    - Dose reduced by 20% to 320 mg/m² starting with Cycle 18 ([place] #11) on 7/5/16 due to neutropenia    10/31/17: delete Leucovorin and 5-FU push d/t neutropenia per Dr. Hardy   Fluorouracil 1200 mg/m²  IV continuous infusion daily on Days 1 and 2 (2400 mg/m² IV over 46-48 hours) -    - Infusion Dose reduced by 20% starting with Cycle 18 ([place] #11) on 7/5/16 due to neutropenia   -20% reduction (1920 mg/m2) to be continued starting C28 per C Alsop APRN   14 day cycles for 12 cycles (adjuvant) or until disease progression or unacceptable toxicity  NCCN Guidelines for Colon Cancer. V.2.2015.  Jay T, et al. Eur J Cancer.1999;35(9):1343-7.      Allergies:  Codeine; Oxaliplatin; Pcn; Sulfa drugs; and Tape     /80   Pulse (!) 106   Temp 36.7 °C (98 °F)   Resp 18   Ht 1.66 m (5' 5.35\")   Wt 76.3 kg (168 lb 3.4 oz)   SpO2 96%   BMI 27.69 kg/m²    Body surface area is 1.88 meters squared.     Labs 06/12/18  ANC~ 2500 Plt = 234k   Hgb = 12.3       Labs 05/29/18  SCr = 1.16mg/dL CrCl ~ 49mL/min  LFT's = WNl, except AP = 207 TBili = 0.9   CMP on even cycles per TP       Fluorouracil (5-FU) 1920 mg/m² x  1.88m² = 3609.6 mg              <5% difference, OK to treat with final dose = 3610 mg    CIVI over 46 hours Via home infusion pump at 1.6ml/hr                   Lalit Andrade, PharmD, BCOP     "

## 2018-06-14 ENCOUNTER — OUTPATIENT INFUSION SERVICES (OUTPATIENT)
Dept: ONCOLOGY | Facility: MEDICAL CENTER | Age: 78
End: 2018-06-14
Attending: INTERNAL MEDICINE
Payer: MEDICARE

## 2018-06-14 VITALS
SYSTOLIC BLOOD PRESSURE: 115 MMHG | OXYGEN SATURATION: 97 % | BODY MASS INDEX: 28.02 KG/M2 | DIASTOLIC BLOOD PRESSURE: 64 MMHG | HEART RATE: 106 BPM | TEMPERATURE: 98.5 F | RESPIRATION RATE: 18 BRPM | WEIGHT: 170.19 LBS

## 2018-06-14 DIAGNOSIS — R94.4 DECREASED GFR: ICD-10-CM

## 2018-06-14 DIAGNOSIS — C20 RECTAL CANCER (HCC): ICD-10-CM

## 2018-06-14 PROCEDURE — 700111 HCHG RX REV CODE 636 W/ 250 OVERRIDE (IP)

## 2018-06-14 PROCEDURE — 96523 IRRIG DRUG DELIVERY DEVICE: CPT

## 2018-06-14 RX ADMIN — SODIUM CHLORIDE, PRESERVATIVE FREE 500 UNITS: 5 INJECTION INTRAVENOUS at 13:29

## 2018-06-14 ASSESSMENT — PAIN SCALES - GENERAL: PAINLEVEL_OUTOF10: 0

## 2018-06-14 NOTE — PROGRESS NOTES
Patient arrived to clinic for 5FU pump disconnect.  Denies any discomfort.  Pump showing 0ml volume and 73.5ml given.  Pump disconnected, port flushed, heparin instilled, and de-accessed per protocol.  Guaze/tape dressing applied.  Next appointment confirmed and pt ambulated out of clinic in no apparent distress

## 2018-06-22 RX ORDER — PROCHLORPERAZINE MALEATE 10 MG
10 TABLET ORAL EVERY 6 HOURS PRN
Status: CANCELLED | OUTPATIENT
Start: 2018-06-26

## 2018-06-22 RX ORDER — ONDANSETRON 2 MG/ML
4 INJECTION INTRAMUSCULAR; INTRAVENOUS
Status: CANCELLED | OUTPATIENT
Start: 2018-06-26

## 2018-06-22 RX ORDER — DEXAMETHASONE SODIUM PHOSPHATE 4 MG/ML
8 INJECTION, SOLUTION INTRA-ARTICULAR; INTRALESIONAL; INTRAMUSCULAR; INTRAVENOUS; SOFT TISSUE ONCE
Status: CANCELLED | OUTPATIENT
Start: 2018-06-26 | End: 2018-06-27

## 2018-06-22 RX ORDER — DEXTROSE MONOHYDRATE 50 MG/ML
INJECTION, SOLUTION INTRAVENOUS CONTINUOUS
Status: CANCELLED | OUTPATIENT
Start: 2018-06-26

## 2018-06-22 RX ORDER — LIDOCAINE HYDROCHLORIDE 10 MG/ML
10 INJECTION, SOLUTION INFILTRATION; PERINEURAL ONCE
Status: CANCELLED | OUTPATIENT
Start: 2018-06-26 | End: 2018-06-26

## 2018-06-22 RX ORDER — ONDANSETRON 8 MG/1
8 TABLET, ORALLY DISINTEGRATING ORAL
Status: CANCELLED | OUTPATIENT
Start: 2018-06-26

## 2018-06-25 ENCOUNTER — TELEPHONE (OUTPATIENT)
Dept: HEMATOLOGY ONCOLOGY | Facility: MEDICAL CENTER | Age: 78
End: 2018-06-25

## 2018-06-25 NOTE — TELEPHONE ENCOUNTER
I left patient a message to call us back urgently in regards to her change in appointments for tomorrow 6/25/18. Please let patient know she is scheduled to have labs done in infusion at 7:00 am and scheduled to see Ella at 7:30 am when she returns our call.

## 2018-06-25 NOTE — TELEPHONE ENCOUNTER
I called patients  ans was able to reach her and give her the new appointment times for tomorrow morning. Patient expressed understanding and stated they are leaving [place] this afternoon to stay over night.

## 2018-06-25 NOTE — PROGRESS NOTES
"Pharmacy Chemotherapy Verification Note:    Patient Name: Karlene Walters      Dx: Colon CA        Cycle 62   Previous treatment: C61 = 6/12/18    Protocol: 5-FU+Leucovorin       IV over 2 hours on Day 1, followed by   IVP on Day 1, followed by    - Dose reduced by 20% to 320 mg/m² starting with Cycle 18 ([place] #11) on 7/5/16 due to neutropenia    10/31/17: delete Leucovorin and 5-FU push d/t neutropenia per Dr. Hardy   Fluorouracil 1200 mg/m²  IV continuous infusion daily on Days 1 and 2 (2400 mg/m² IV over 46-48 hours) -    - Infusion Dose reduced by 20% starting with Cycle 18 ([place] #11) on 7/5/16 due to neutropenia   -20% reduction (1920 mg/m2) to be continued starting C28 per C Alsop APRN   14 day cycles for 12 cycles (adjuvant) or until disease progression or unacceptable toxicity  NCCN Guidelines for Colon Cancer. V.2.2015.  Jay T, et al. Eur J Cancer.1999;35(9):1343-7.      Allergies:  Codeine; Oxaliplatin; Pcn; Sulfa drugs; and Tape     /65   Pulse (!) 109   Temp 36.4 °C (97.5 °F)   Resp 18   Ht 1.66 m (5' 5.35\")   Wt 76 kg (167 lb 8.8 oz)   SpO2 97%   BMI 27.58 kg/m²    Body surface area is 1.87 meters squared.     ANC~ 2520 Plt = 228k   Hgb = 12.4     SCr = 1.27mg/dL CrCl ~ 44mL/min   LFT's = WNL, except AP = 205 TBili = 0.8    CMP on even cycles per TP       Fluorouracil (5-FU) 1920 mg/m² x  1.87m² = 3590mg              <5% difference, OK to treat with final dose = 3590mg    CIVI over 46 hours Via home infusion pump at 1.6ml/hr                   Nakia Arroyo, PharmD    "

## 2018-06-26 ENCOUNTER — OUTPATIENT INFUSION SERVICES (OUTPATIENT)
Dept: ONCOLOGY | Facility: MEDICAL CENTER | Age: 78
End: 2018-06-26
Attending: INTERNAL MEDICINE
Payer: MEDICARE

## 2018-06-26 ENCOUNTER — OFFICE VISIT (OUTPATIENT)
Dept: HEMATOLOGY ONCOLOGY | Facility: MEDICAL CENTER | Age: 78
End: 2018-06-26
Payer: MEDICARE

## 2018-06-26 VITALS
HEIGHT: 65 IN | SYSTOLIC BLOOD PRESSURE: 124 MMHG | DIASTOLIC BLOOD PRESSURE: 65 MMHG | WEIGHT: 167.55 LBS | HEART RATE: 109 BPM | BODY MASS INDEX: 27.92 KG/M2 | OXYGEN SATURATION: 97 % | TEMPERATURE: 97.5 F | RESPIRATION RATE: 18 BRPM

## 2018-06-26 VITALS
HEART RATE: 109 BPM | RESPIRATION RATE: 18 BRPM | HEIGHT: 65 IN | TEMPERATURE: 97.5 F | WEIGHT: 167.55 LBS | DIASTOLIC BLOOD PRESSURE: 65 MMHG | BODY MASS INDEX: 27.92 KG/M2 | SYSTOLIC BLOOD PRESSURE: 124 MMHG

## 2018-06-26 VITALS
HEART RATE: 104 BPM | BODY MASS INDEX: 27.9 KG/M2 | TEMPERATURE: 97.2 F | SYSTOLIC BLOOD PRESSURE: 122 MMHG | WEIGHT: 167.44 LBS | HEIGHT: 65 IN | OXYGEN SATURATION: 99 % | RESPIRATION RATE: 18 BRPM | DIASTOLIC BLOOD PRESSURE: 78 MMHG

## 2018-06-26 DIAGNOSIS — R10.11 RIGHT UPPER QUADRANT ABDOMINAL PAIN: ICD-10-CM

## 2018-06-26 DIAGNOSIS — R94.4 DECREASED GFR: ICD-10-CM

## 2018-06-26 DIAGNOSIS — C20 RECTAL CANCER (HCC): ICD-10-CM

## 2018-06-26 DIAGNOSIS — C78.7 SECONDARY MALIGNANT NEOPLASM OF LIVER (HCC): ICD-10-CM

## 2018-06-26 DIAGNOSIS — E87.6 HYPOKALEMIA: ICD-10-CM

## 2018-06-26 LAB
ALBUMIN SERPL BCP-MCNC: 3.8 G/DL (ref 3.2–4.9)
ALBUMIN/GLOB SERPL: 1.3 G/DL
ALP SERPL-CCNC: 205 U/L (ref 30–99)
ALT SERPL-CCNC: 35 U/L (ref 2–50)
ANION GAP SERPL CALC-SCNC: 11 MMOL/L (ref 0–11.9)
ANISOCYTOSIS BLD QL SMEAR: ABNORMAL
AST SERPL-CCNC: 34 U/L (ref 12–45)
BASOPHILS # BLD AUTO: 0.9 % (ref 0–1.8)
BASOPHILS # BLD: 0.04 K/UL (ref 0–0.12)
BILIRUB SERPL-MCNC: 0.8 MG/DL (ref 0.1–1.5)
BUN SERPL-MCNC: 25 MG/DL (ref 8–22)
CALCIUM SERPL-MCNC: 9.5 MG/DL (ref 8.5–10.5)
CEA SERPL-MCNC: 4.4 NG/ML (ref 0–3)
CHLORIDE SERPL-SCNC: 106 MMOL/L (ref 96–112)
CO2 SERPL-SCNC: 22 MMOL/L (ref 20–33)
CREAT SERPL-MCNC: 1.27 MG/DL (ref 0.5–1.4)
EOSINOPHIL # BLD AUTO: 0.24 K/UL (ref 0–0.51)
EOSINOPHIL NFR BLD: 5.4 % (ref 0–6.9)
ERYTHROCYTE [DISTWIDTH] IN BLOOD BY AUTOMATED COUNT: 66.2 FL (ref 35.9–50)
GLOBULIN SER CALC-MCNC: 3 G/DL (ref 1.9–3.5)
GLUCOSE SERPL-MCNC: 155 MG/DL (ref 65–99)
HCT VFR BLD AUTO: 39.1 % (ref 37–47)
HGB BLD-MCNC: 12.4 G/DL (ref 12–16)
LG PLATELETS BLD QL SMEAR: NORMAL
LYMPHOCYTES # BLD AUTO: 1.17 K/UL (ref 1–4.8)
LYMPHOCYTES NFR BLD: 26.1 % (ref 22–41)
MANUAL DIFF BLD: NORMAL
MCH RBC QN AUTO: 26.9 PG (ref 27–33)
MCHC RBC AUTO-ENTMCNC: 31.7 G/DL (ref 33.6–35)
MCV RBC AUTO: 84.8 FL (ref 81.4–97.8)
METAMYELOCYTES NFR BLD MANUAL: 1.8 %
MONOCYTES # BLD AUTO: 0.41 K/UL (ref 0–0.85)
MONOCYTES NFR BLD AUTO: 9 % (ref 0–13.4)
MORPHOLOGY BLD-IMP: NORMAL
MYELOCYTES NFR BLD MANUAL: 0.9 %
NEUTROPHILS # BLD AUTO: 2.52 K/UL (ref 2–7.15)
NEUTROPHILS NFR BLD: 55.9 % (ref 44–72)
NRBC # BLD AUTO: 0 K/UL
NRBC BLD-RTO: 0 /100 WBC
OVALOCYTES BLD QL SMEAR: NORMAL
PLATELET # BLD AUTO: 228 K/UL (ref 164–446)
PLATELET BLD QL SMEAR: NORMAL
PMV BLD AUTO: 10.5 FL (ref 9–12.9)
POIKILOCYTOSIS BLD QL SMEAR: NORMAL
POTASSIUM SERPL-SCNC: 3.2 MMOL/L (ref 3.6–5.5)
PROT SERPL-MCNC: 6.8 G/DL (ref 6–8.2)
RBC # BLD AUTO: 4.61 M/UL (ref 4.2–5.4)
RBC BLD AUTO: PRESENT
SODIUM SERPL-SCNC: 139 MMOL/L (ref 135–145)
WBC # BLD AUTO: 4.5 K/UL (ref 4.8–10.8)

## 2018-06-26 PROCEDURE — 700111 HCHG RX REV CODE 636 W/ 250 OVERRIDE (IP)

## 2018-06-26 PROCEDURE — 80053 COMPREHEN METABOLIC PANEL: CPT

## 2018-06-26 PROCEDURE — 96375 TX/PRO/DX INJ NEW DRUG ADDON: CPT

## 2018-06-26 PROCEDURE — A4212 NON CORING NEEDLE OR STYLET: HCPCS

## 2018-06-26 PROCEDURE — 85027 COMPLETE CBC AUTOMATED: CPT

## 2018-06-26 PROCEDURE — 82378 CARCINOEMBRYONIC ANTIGEN: CPT

## 2018-06-26 PROCEDURE — 99214 OFFICE O/P EST MOD 30 MIN: CPT | Performed by: NURSE PRACTITIONER

## 2018-06-26 PROCEDURE — 85007 BL SMEAR W/DIFF WBC COUNT: CPT

## 2018-06-26 PROCEDURE — G0498 CHEMO EXTEND IV INFUS W/PUMP: HCPCS

## 2018-06-26 PROCEDURE — 700111 HCHG RX REV CODE 636 W/ 250 OVERRIDE (IP): Performed by: NURSE PRACTITIONER

## 2018-06-26 PROCEDURE — 96374 THER/PROPH/DIAG INJ IV PUSH: CPT | Mod: XU

## 2018-06-26 RX ORDER — DEXAMETHASONE SODIUM PHOSPHATE 4 MG/ML
8 INJECTION, SOLUTION INTRA-ARTICULAR; INTRALESIONAL; INTRAMUSCULAR; INTRAVENOUS; SOFT TISSUE ONCE
Status: COMPLETED | OUTPATIENT
Start: 2018-06-26 | End: 2018-06-26

## 2018-06-26 RX ORDER — OXYCODONE HYDROCHLORIDE 5 MG/1
5 TABLET ORAL EVERY 4 HOURS PRN
Qty: 30 TAB | Refills: 0 | Status: SHIPPED | OUTPATIENT
Start: 2018-06-26 | End: 2018-07-26

## 2018-06-26 RX ADMIN — SODIUM CHLORIDE, PRESERVATIVE FREE 500 UNITS: 5 INJECTION INTRAVENOUS at 07:01

## 2018-06-26 RX ADMIN — DEXAMETHASONE SODIUM PHOSPHATE 8 MG: 4 INJECTION, SOLUTION INTRAMUSCULAR; INTRAVENOUS at 10:31

## 2018-06-26 RX ADMIN — FLUOROURACIL 3590 MG: 50 INJECTION, SOLUTION INTRAVENOUS at 11:06

## 2018-06-26 ASSESSMENT — ENCOUNTER SYMPTOMS
NAUSEA: 0
FEVER: 0
CONSTIPATION: 0
COUGH: 1
SHORTNESS OF BREATH: 1
VOMITING: 0
HEADACHES: 0
DIZZINESS: 0
WEIGHT LOSS: 0
BACK PAIN: 1
PALPITATIONS: 1
DIARRHEA: 0
ABDOMINAL PAIN: 1
CHILLS: 0

## 2018-06-26 ASSESSMENT — PAIN SCALES - GENERAL
PAINLEVEL: NO PAIN
PAINLEVEL_OUTOF10: 0
PAINLEVEL_OUTOF10: 0

## 2018-06-26 NOTE — PROGRESS NOTES
Pt returns for C54 5FU home pump for colon cancer. Pt with L chest port, already accessed from this AMs lab appt. Port flushed, blood return observed. Premed given and home pump attached. Settings double checked by 2 RNs. Pt knows to return on Thursday for pump removal and plans for CT scan on Friday am @ 9:00. Called pt to inform her of time change and plans to return to IS for port de-access. Pt discharged home under care of  in no apparent distress.

## 2018-06-26 NOTE — PROGRESS NOTES
Chemotherapy Verification - SECONDARY RN       Height = 166 cm  Weight = 76 kg  BSA = 1.87 m2       Medication: Home Infusion 5FU  Dose: 1920 mg/m2 dr Calculated Dose: 3594 mg                             (In mg/m2, AUC, mg/kg)       I confirm that this process was performed independently.

## 2018-06-26 NOTE — PROGRESS NOTES
Subjective:      Karlene Walters is a 78 y.o. female who presents for Follow-Up (prechemo) for rectal carcinoma.         HPI    Patient seen today in follow up for rectal carcinoma. She is unaccompanied for today's visit. She is scheduled to receive cycle #54 of single agent 5FU, at 20% dose reduction.    Patient seems to be doing very well and tolerating her treatment well. She does have some fatigue but states it is slowly improving. She denies any fevers, chills or weight loss. Appetite has been stable. She does have a cough but it's very mild and not worsening or changing. She experienced some shortness of breath with activity and related to stress. She does experience heart palpitations off and on and is noted to have this more so with shortness of breath and stress as well. She denies any chest pain. Patient has been followed by cardiology and is seen by Dr. Haque. She does have some abdominal cramping at times but denies any nausea, vomiting, diarrhea or constipation. Her output via her ostomy has been stable. Every once in a while she'll notice a cramping pain in her lower abdomen by her stoma. She does require oxycodone day of the pump disconnect when she gets back into [facility] and [place]. She was given 30 tablets on November 7, 2017 by ANDREW Paz, and patient stated she has a proximally 6 pills left. I did confirm with the [place] prescription monitoring program via [employer] and confirmed that this was last filled on November 7, 2017. She does have intermittent back pain to the left side that radiates to her back, and states she noticed this after doing activity around the house such as vacuuming. She is voiding without difficulty. She denies any dizziness or headaches.    Patient is taking approximately 64 ounces of fluid per day. She does have Lasix to take as needed for lower extremity edema. She stated she took Lasix for the day without taking a potassium. Her potassium is slightly low today  "at 3.2.      Allergies   Allergen Reactions   • Codeine      \"gets drunk\"   • Oxaliplatin Anaphylaxis   • Pcn [Penicillins] Itching   • Sulfa Drugs Itching   • Tape Rash     PAPER TAPE OK     Current Outpatient Prescriptions on File Prior to Visit   Medication Sig Dispense Refill   • potassium chloride ER (KLOR-CON) 10 MEQ tablet TAKE ONE TABLET EVERY DAY AS NEEDED 30 Tab 6   • nitroglycerin (NITROSTAT) 0.4 MG SL Tab Place 1 Tab under tongue as needed for Chest Pain. 25 Tab 0   • loperamide (IMODIUM) 2 MG Cap Take 2 mg by mouth 4 times a day as needed for Diarrhea.     • raNITidine (ZANTAC) 150 MG Tab TAKE 1 TABLET 2 TIMES A DAY 60 Tab 3   • rivaroxaban (XARELTO) 10 MG Tab tablet Take 1 Tab by mouth every day. 30 Tab 6   • lidocaine-prilocaine (EMLA) 2.5-2.5 % Cream APPLY A THICK FILM OVER THE PORT ACCESS SITE PRIOR TO ACCESS 30 g 2   • oxycodone immediate-release (ROXICODONE) 5 MG Tab Take 1 Tab by mouth every four hours as needed for Severe Pain. 30 Tab 0   • Tiotropium Bromide-Olodaterol (STIOLTO RESPIMAT) 2.5-2.5 MCG/ACT Aero Soln Take 2 Puffs by mouth every day. 1 Inhaler 11   • albuterol (PROAIR HFA) 108 (90 Base) MCG/ACT Aero Soln inhalation aerosol Inhale 2 Puffs by mouth every four hours as needed for Shortness of Breath (wheezing). 1 Inhaler 11   • furosemide (LASIX) 40 MG Tab Take 1 Tab by mouth every day. 30 Tab 11   • ondansetron (ZOFRAN) 4 MG Tab tablet      • cyanocobalamin (VITAMIN B-12) 500 MCG Tab Take 500 mcg by mouth every day.     • Multiple Vitamins-Minerals (CENTRUM SILVER PO) Take  by mouth.     • Cholecalciferol (VITAMIN D3) 400 UNITS CAPS Take 1 Cap by mouth every day.     • loratadine (CLARITIN) 10 MG TABS Take 10 mg by mouth every day. Indications: Hayfever       Current Facility-Administered Medications on File Prior to Visit   Medication Dose Route Frequency Provider Last Rate Last Dose   • heparin pf injection 500 Units  500 Units Intracatheter PRN Eric Hardy M.D.   500 Units at " "03/09/18 1542   • heparin pf injection 500 Units  500 Units Intracatheter PRN Ella De, A.P.N.   500 Units at 08/22/17 0856       Review of Systems   Constitutional: Positive for malaise/fatigue (not as bad as it has been and slowly improving). Negative for chills, fever and weight loss.   Respiratory: Positive for cough (very mild but not worsening or changing) and shortness of breath (with activity and stress).    Cardiovascular: Positive for palpitations (off and on - noted with stress and SOB). Negative for chest pain.   Gastrointestinal: Positive for abdominal pain (intermittently at times). Negative for constipation, diarrhea, nausea and vomiting.   Genitourinary: Negative for dysuria.   Musculoskeletal: Positive for back pain.   Neurological: Negative for dizziness and headaches.          Objective:     /78   Pulse (!) 104   Temp 36.2 °C (97.2 °F)   Resp 18   Ht 1.66 m (5' 5.35\")   Wt 76 kg (167 lb 7 oz)   SpO2 99%   Breastfeeding? No   BMI 27.56 kg/m²      Physical Exam   Constitutional: She is oriented to person, place, and time. She appears well-developed and well-nourished. No distress.   HENT:   Head: Normocephalic and atraumatic.   Mouth/Throat: Oropharynx is clear and moist.   Cardiovascular: Regular rhythm, normal heart sounds and intact distal pulses.  Exam reveals no gallop and no friction rub.    No murmur heard.  Sightly tachycardic at 104   Pulmonary/Chest: Effort normal and breath sounds normal. No respiratory distress. She has no wheezes.   Abdominal: Soft. Bowel sounds are normal. She exhibits no distension. There is no tenderness.   Musculoskeletal: Normal range of motion. She exhibits no edema or tenderness.   Neurological: She is alert and oriented to person, place, and time.   Skin: Skin is warm and dry. No rash noted. She is not diaphoretic. No erythema. No pallor.   Mild dry hands noted   Psychiatric: She has a normal mood and affect. Her behavior is normal.   Vitals " reviewed.      [facility] on 06/26/2018   Component Date Value Ref Range Status   • WBC 06/26/2018 4.5* 4.8 - 10.8 K/uL Final   • RBC 06/26/2018 4.61  4.20 - 5.40 M/uL Final   • Hemoglobin 06/26/2018 12.4  12.0 - 16.0 g/dL Final   • Hematocrit 06/26/2018 39.1  37.0 - 47.0 % Final   • MCV 06/26/2018 84.8  81.4 - 97.8 fL Final   • MCH 06/26/2018 26.9* 27.0 - 33.0 pg Final   • MCHC 06/26/2018 31.7* 33.6 - 35.0 g/dL Final   • RDW 06/26/2018 66.2* 35.9 - 50.0 fL Final   • Platelet Count 06/26/2018 228  164 - 446 K/uL Final   • MPV 06/26/2018 10.5  9.0 - 12.9 fL Final   • Nucleated RBC 06/26/2018 0.00  /100 WBC Final   • NRBC (Absolute) 06/26/2018 0.00  K/uL Final   • Neutrophils-Polys 06/26/2018 55.90  44.00 - 72.00 % Final   • Lymphocytes 06/26/2018 26.10  22.00 - 41.00 % Final   • Monocytes 06/26/2018 9.00  0.00 - 13.40 % Final   • Eosinophils 06/26/2018 5.40  0.00 - 6.90 % Final   • Basophils 06/26/2018 0.90  0.00 - 1.80 % Final   • Neutrophils (Absolute) 06/26/2018 2.52  2.00 - 7.15 K/uL Final    Includes immature neutrophils, if present.   • Lymphs (Absolute) 06/26/2018 1.17  1.00 - 4.80 K/uL Final   • Monos (Absolute) 06/26/2018 0.41  0.00 - 0.85 K/uL Final   • Eos (Absolute) 06/26/2018 0.24  0.00 - 0.51 K/uL Final   • Baso (Absolute) 06/26/2018 0.04  0.00 - 0.12 K/uL Final   • Anisocytosis 06/26/2018 1+   Final   • Sodium 06/26/2018 139  135 - 145 mmol/L Final   • Potassium 06/26/2018 3.2* 3.6 - 5.5 mmol/L Final   • Chloride 06/26/2018 106  96 - 112 mmol/L Final   • Co2 06/26/2018 22  20 - 33 mmol/L Final   • Anion Gap 06/26/2018 11.0  0.0 - 11.9 Final   • Glucose 06/26/2018 155* 65 - 99 mg/dL Final   • Bun 06/26/2018 25* 8 - 22 mg/dL Final   • Creatinine 06/26/2018 1.27  0.50 - 1.40 mg/dL Final   • Calcium 06/26/2018 9.5  8.5 - 10.5 mg/dL Final   • AST(SGOT) 06/26/2018 34  12 - 45 U/L Final   • ALT(SGPT) 06/26/2018 35  2 - 50 U/L Final   • Alkaline Phosphatase 06/26/2018 205* 30 - 99 U/L Final    • Total Bilirubin 06/26/2018 0.8  0.1 - 1.5 mg/dL Final   • Albumin 06/26/2018 3.8  3.2 - 4.9 g/dL Final   • Total Protein 06/26/2018 6.8  6.0 - 8.2 g/dL Final   • Globulin 06/26/2018 3.0  1.9 - 3.5 g/dL Final   • A-G Ratio 06/26/2018 1.3  g/dL Final   • GFR If  06/26/2018 49* >60 mL/min/1.73 m 2 Final   • GFR If Non  06/26/2018 41* >60 mL/min/1.73 m 2 Final   • Metamyelocytes 06/26/2018 1.80  % Final   • Myelocytes 06/26/2018 0.90  % Final   • Manual Diff Status 06/26/2018 PERFORMED   Final   • Peripheral Smear Review 06/26/2018 see below   Final    Comment: Due to instrument suspect flags, further review of peripheral smear is  indicated on this patient sample. This review may or may not result in  abnormal findings.     • Plt Estimation 06/26/2018 Normal   Final   • RBC Morphology 06/26/2018 Present   Final   • Large Platelets 06/26/2018 2+   Final   • Poikilocytosis 06/26/2018 1+   Final   • Ovalocytes 06/26/2018 1+   Final          Assessment/Plan:     1. Rectal cancer (HCC)  oxyCODONE immediate-release (ROXICODONE) 5 MG Tab    CONSENT FOR OPIATE PRESCRIPTION    REFERRAL TO CARDIOLOGY    CT-CHEST (THORAX) WITH   2. Secondary malignant neoplasm of liver (HCC)     3. Right upper quadrant abdominal pain  oxyCODONE immediate-release (ROXICODONE) 5 MG Tab    CONSENT FOR OPIATE PRESCRIPTION   4. Hypokalemia       Plan  1. Patient is due for cycle #54 single agent 5-FU today. I did review her CBC and CMP and CEA and we will proceed with treatment as planned. Patient is having continued kidney dysfunction. She is taking in fluid, proximal 64 ounces per day. I've asked her to focus on her hydration over the next 2 weeks and we will re-check her kidney function at that time.    2. Patient is status post Y 90 with Dr. Juarez, and she has a scheduled CT scan of the abdomen scheduled for this Friday. However I did speak with Dr. Hardy as patient's CEA is slowly trending up, to 4.4 today,  and he would like to add a CT chest to her imaging.    3. Patient is hypokalemic, likely related to dose of Lasix. I've asked patient to take 20 mEq by mouth twice a day ×5 days.    4. Patient has requested a referral back to cardio oncology. She is established with a cardiologist but she would like to be seen by the cardio oncologist, Dr. Kelly.    5. Patient with pain intermittently at times. She states she has more pain on disconnect days and is required to take one oxycodone when she gets home in [place]. I did confirm with the [place] discussion monitoring program and last prescription was filled by ANDREW Paz, on November 7, 2017 which she was given 30 tablets. Patient stated she has approximately 6 left. I did complete the opioid risk tool as well as the consent form and patient was given a new prescription for oxycodone 5 mg by mouth every 4 hours as needed, 30 tablets.    Opioid Risk Score: 0    Interpretation of Opioid Risk Score   Score 0-3 = Low risk of abuse. Do UDS at least once per year.  Score 4-7 = Moderate risk of abuse. Do UDS 1-4 times per year.  Score 8+ = High risk of abuse. Refer to specialist.    In prescribing controlled substances to this patient, I certify that I have obtained and reviewed the medical history of Karlene Walters. I have also made a good dionte effort to obtain applicable records from other providers who have treated the patient and records did not demonstrate any increased risk of substance abuse that would prevent me from prescribing controlled substances.     I have conducted a physical exam and documented it. I have reviewed Ms. Walters’s prescription history as maintained by the [place] Prescription Monitoring Program.     I have assessed the patient’s risk for abuse, dependency, and addiction using the validated Opioid Risk Tool available at https://www.mdcalc.com/qazbdz-rdzq-uqmi-ort-narcotic-abuse.     Given the above, I believe the benefits of controlled  substance therapy outweigh the risks. The reasons for prescribing controlled substances include non-narcotic, oral analgesic alternatives have been inadequate for pain control. Accordingly, I have discussed the risk and benefits, treatment plan, and alternative therapies with the patient.     6. Patient to follow-up in the clinic in 4 weeks and continue with treatment every 2 weeks, depending on imaging results.

## 2018-06-26 NOTE — PROGRESS NOTES
Chemotherapy Verification - PRIMARY RN      Height = 166 cm  Weight = 76 kg  BSA = 1.87 m2       Medication: Fluorouracil (5FU) home pump  Dose: 1920 mg/m2  Calculated Dose: 3590.4 mg over 46 hours                               I confirm this process was performed independently with the BSA and all final chemotherapy dosing calculations congruent.  Any discrepancies of 5% or greater have been addressed with the chemotherapy pharmacist. The resolution of the discrepancy has been documented in the EPIC progress notes.

## 2018-06-26 NOTE — PROGRESS NOTES
"Pharmacy Chemotherapy Verification Note:    Patient Name: Karlene Walters      Dx: Colon CA        Protocol: 5-FU + Leucovorin  *Dosing Reference*       IV over 2 hours on Day 1, followed by    IVP on Day 1, followed by         -- 10/31/17: delete Leucovorin and 5-FU push d/t neutropenia per Dr. Hardy   Fluorouracil   IV continuous infusion daily on Days 1 and 2 ( IV over 46-48 hours)        -- 20% reduction (1920 mg/m2) to be continued starting C28 per C Alsop APRN   14 day cycles for 12 cycles (adjuvant) or until disease progression or unacceptable toxicity   NCCN Guidelines for Colon Cancer. V.2.2015.  Jay T, et al. Eur J Cancer.1999;35(9):1343-7.      Allergies:  Codeine; Oxaliplatin; Pcn; Sulfa drugs; and Tape       /65   Pulse (!) 109   Temp 36.4 °C (97.5 °F)   Resp 18   Ht 1.66 m (5' 5.35\")   Wt 76 kg (167 lb 8.8 oz)   SpO2 97%   BMI 27.58 kg/m²  Body surface area is 1.87 meters squared.     6/26/18-  ANC~ 2520 Plt = 228k   Hgb = 12.4       6/26/18- CMP on even cycles per TP  SCr = 1.27mg/dL         CrCl ~ 44 mL/min         LFT's = WNL, except AP = 205    TBili = 0.8       Drug Order   (Drug name, dose, route, IV Fluid & volume, frequency, number of doses) Cycle 62 (C54 in [place])  Previous treatment: C61 = 6/12/18     Medication = Fluorouracil (5-FU)   Base Dose = 1920 mg/m²  Calc Dose:Base Dose x 1.87 m² = 3590 mg  Final Dose = 3590 mg   Route = CIVI  Drug Conc = 50 mg/mL  Fluid & Volume = 71.8 mL (+ 3 mL overfill)  Admin Duration = CIVI over 46 hrs @ 1.6 mL/hr   Via CADD pump for home infusion        <5% difference, ok to treat with final dose     By my signature below, I confirm this process was performed independently with the BSA and all final chemotherapy dosing calculations congruent. I have reviewed the above chemotherapy order and that my calculation of the final dose and BSA (when applicable) corroborate those calculations of the  pharmacist.     Norris Mitchell, PharmD  "

## 2018-06-26 NOTE — PROGRESS NOTES
Pt arrived to [facility], ambulatory, for labs prior to chemotherapy. Pt voices no complaints. Pt states she has a CT scan on Friday. Upon review of appts, AZEB Morrissey noted that patient's CT was not scheduled until next Friday, 7/6. Call placed to CT scheduling by AZEB Morrissey, appointment scheduled at 3:15 on Friday, 6/29. Port accessed in sterile manner, positive blood return noted. Labs drawn per order. Port flushed and heparin locked per policy, port left accessed. Pt left IS with no s/sx of distress. Follow up appointment confirmed for chemo later this morning.

## 2018-06-28 ENCOUNTER — OUTPATIENT INFUSION SERVICES (OUTPATIENT)
Dept: ONCOLOGY | Facility: MEDICAL CENTER | Age: 78
End: 2018-06-28
Attending: INTERNAL MEDICINE
Payer: MEDICARE

## 2018-06-28 VITALS
RESPIRATION RATE: 18 BRPM | TEMPERATURE: 97.4 F | DIASTOLIC BLOOD PRESSURE: 77 MMHG | HEART RATE: 84 BPM | BODY MASS INDEX: 28.39 KG/M2 | HEIGHT: 65 IN | WEIGHT: 170.42 LBS | SYSTOLIC BLOOD PRESSURE: 113 MMHG

## 2018-06-28 PROCEDURE — 96523 IRRIG DRUG DELIVERY DEVICE: CPT

## 2018-06-28 PROCEDURE — 700111 HCHG RX REV CODE 636 W/ 250 OVERRIDE (IP)

## 2018-06-28 RX ADMIN — SODIUM CHLORIDE, PRESERVATIVE FREE 500 UNITS: 5 INJECTION INTRAVENOUS at 13:36

## 2018-06-28 ASSESSMENT — PAIN SCALES - GENERAL: PAINLEVEL_OUTOF10: 0

## 2018-06-28 NOTE — PROGRESS NOTES
Patient presents for home pump disconnect and port flush. Patient leaving port accessed for CT scan tomorrow. Home pump disconnected and stored properly. Port flushed with brisk blood return. Patient returns tomorrow and released in no acute distress.

## 2018-06-29 ENCOUNTER — APPOINTMENT (OUTPATIENT)
Dept: RADIOLOGY | Facility: MEDICAL CENTER | Age: 78
End: 2018-06-29
Attending: NURSE PRACTITIONER
Payer: MEDICARE

## 2018-06-29 ENCOUNTER — OUTPATIENT INFUSION SERVICES (OUTPATIENT)
Dept: ONCOLOGY | Facility: MEDICAL CENTER | Age: 78
End: 2018-06-29
Attending: INTERNAL MEDICINE
Payer: MEDICARE

## 2018-06-29 ENCOUNTER — TELEPHONE (OUTPATIENT)
Dept: RADIOLOGY | Facility: MEDICAL CENTER | Age: 78
End: 2018-06-29

## 2018-06-29 DIAGNOSIS — C18.9 COLON CANCER METASTASIZED TO LIVER (HCC): ICD-10-CM

## 2018-06-29 DIAGNOSIS — C78.7 COLON CANCER METASTASIZED TO LIVER (HCC): ICD-10-CM

## 2018-06-29 DIAGNOSIS — C20 RECTAL CANCER (HCC): ICD-10-CM

## 2018-06-29 PROCEDURE — 96523 IRRIG DRUG DELIVERY DEVICE: CPT

## 2018-06-29 PROCEDURE — 700111 HCHG RX REV CODE 636 W/ 250 OVERRIDE (IP)

## 2018-06-29 PROCEDURE — 74160 CT ABDOMEN W/CONTRAST: CPT

## 2018-06-29 PROCEDURE — 71260 CT THORAX DX C+: CPT

## 2018-06-29 PROCEDURE — 700117 HCHG RX CONTRAST REV CODE 255: Performed by: NURSE PRACTITIONER

## 2018-06-29 RX ADMIN — IOHEXOL 100 ML: 350 INJECTION, SOLUTION INTRAVENOUS at 09:49

## 2018-06-29 RX ADMIN — HEPARIN 500 UNITS: 100 SYRINGE at 10:30

## 2018-06-29 ASSESSMENT — PAIN SCALES - GENERAL: PAINLEVEL_OUTOF10: 0

## 2018-06-29 NOTE — TELEPHONE ENCOUNTER
CT report reviewed: no evidence of disease progression in the liver. Pt notified of results today. Will personally review scan with Dr. Juarez when he is in clinic 7/5 and call pt with his interpretation and plan.

## 2018-06-29 NOTE — PROGRESS NOTES
Pt returns for port de-access post-CT scan. Port flushed, blood return observed. Heparin instilled. Needle removed, tip intact. Gauze dressing applied. Pt knows when to return. Discharged home under self care in no apparent distress.

## 2018-07-02 ENCOUNTER — TELEPHONE (OUTPATIENT)
Dept: HEMATOLOGY ONCOLOGY | Facility: MEDICAL CENTER | Age: 78
End: 2018-07-02

## 2018-07-02 NOTE — TELEPHONE ENCOUNTER
Patient called stated that she would like a call back regarding her most recent Imagining, she had done CT-CHEST (THORAX) WITH. She would like her results.

## 2018-07-02 NOTE — TELEPHONE ENCOUNTER
Following review of CT chest with Dr. Hardy called patient and informed her there is no new progression seen in the scan. Informed her of plan to continue to monitor and do a PET scan in 2-3 months.  Patient was grateful for the follow up and in agreement with POC.

## 2018-07-09 RX ORDER — LIDOCAINE HYDROCHLORIDE 10 MG/ML
10 INJECTION, SOLUTION INFILTRATION; PERINEURAL ONCE
Status: CANCELLED | OUTPATIENT
Start: 2018-07-10 | End: 2018-07-10

## 2018-07-09 RX ORDER — DEXTROSE MONOHYDRATE 50 MG/ML
INJECTION, SOLUTION INTRAVENOUS CONTINUOUS
Status: CANCELLED | OUTPATIENT
Start: 2018-07-10

## 2018-07-09 RX ORDER — ONDANSETRON 2 MG/ML
4 INJECTION INTRAMUSCULAR; INTRAVENOUS
Status: CANCELLED | OUTPATIENT
Start: 2018-07-10

## 2018-07-09 RX ORDER — DEXAMETHASONE SODIUM PHOSPHATE 4 MG/ML
8 INJECTION, SOLUTION INTRA-ARTICULAR; INTRALESIONAL; INTRAMUSCULAR; INTRAVENOUS; SOFT TISSUE ONCE
Status: CANCELLED | OUTPATIENT
Start: 2018-07-10 | End: 2018-07-10

## 2018-07-09 RX ORDER — ONDANSETRON 8 MG/1
8 TABLET, ORALLY DISINTEGRATING ORAL
Status: CANCELLED | OUTPATIENT
Start: 2018-07-10

## 2018-07-09 RX ORDER — PROCHLORPERAZINE MALEATE 10 MG
10 TABLET ORAL EVERY 6 HOURS PRN
Status: CANCELLED | OUTPATIENT
Start: 2018-07-10

## 2018-07-09 NOTE — PROGRESS NOTES
"Pharmacy Chemotherapy Verification Note:    Patient Name: Karlene Walters      Dx: Colon CA        Cycle 63   Previous treatment: C62 = 6/26/18    Protocol: 5-FU+Leucovorin       IV over 2 hours on Day 1, followed by   IVP on Day 1, followed by    - Dose reduced by 20% to 320 mg/m² starting with Cycle 18 ([place] #11) on 7/5/16 due to neutropenia    10/31/17: delete Leucovorin and 5-FU push d/t neutropenia per Dr. Hardy   Fluorouracil 1200 mg/m²  IV continuous infusion daily on Days 1 and 2 (2400 mg/m² IV over 46-48 hours) -    - Infusion Dose reduced by 20% starting with Cycle 18 (S[place]ieski #11) on 7/5/16 due to neutropenia   -20% reduction (1920 mg/m2) to be continued starting C28 per C Alsop APRN   14 day cycles for 12 cycles (adjuvant) or until disease progression or unacceptable toxicity  NCCN Guidelines for Colon Cancer. V.2.2015.  Jay T, et al. Eur J Cancer.1999;35(9):1343-7.      Allergies:  Codeine; Oxaliplatin; Pcn; Sulfa drugs; and Tape     /69   Pulse 100   Temp 36.6 °C (97.9 °F)   Resp 18   Ht 1.66 m (5' 5.35\")   Wt 75.3 kg (166 lb 0.1 oz)   SpO2 99%   BMI 27.33 kg/m²    Body surface area is 1.86 meters squared.     ANC~ 2530 Plt = 212k   Hgb = 11.9  SCr = 1.12mg/dL CrCl ~ 49mL/min     CMP on even cycles per TP       Fluorouracil (5-FU) 1920 mg/m² x  1.86m² = 3571mg              <5% difference, OK to treat with final dose = 3570mg    CIVI over 46 hours Via home infusion pump at 1.6ml/hr                   Nakia Arroyo, PharmD    "

## 2018-07-10 ENCOUNTER — OUTPATIENT INFUSION SERVICES (OUTPATIENT)
Dept: ONCOLOGY | Facility: MEDICAL CENTER | Age: 78
End: 2018-07-10
Attending: INTERNAL MEDICINE
Payer: MEDICARE

## 2018-07-10 VITALS
RESPIRATION RATE: 18 BRPM | SYSTOLIC BLOOD PRESSURE: 138 MMHG | DIASTOLIC BLOOD PRESSURE: 69 MMHG | HEART RATE: 100 BPM | BODY MASS INDEX: 27.66 KG/M2 | WEIGHT: 166.01 LBS | TEMPERATURE: 97.9 F | HEIGHT: 65 IN | OXYGEN SATURATION: 99 %

## 2018-07-10 DIAGNOSIS — C20 RECTAL CANCER (HCC): ICD-10-CM

## 2018-07-10 DIAGNOSIS — R94.4 DECREASED GFR: ICD-10-CM

## 2018-07-10 LAB
ANION GAP SERPL CALC-SCNC: 8 MMOL/L (ref 0–11.9)
ANISOCYTOSIS BLD QL SMEAR: ABNORMAL
BASOPHILS # BLD AUTO: 1.2 % (ref 0–1.8)
BASOPHILS # BLD: 0.06 K/UL (ref 0–0.12)
BUN SERPL-MCNC: 21 MG/DL (ref 8–22)
CALCIUM SERPL-MCNC: 9.4 MG/DL (ref 8.5–10.5)
CHLORIDE SERPL-SCNC: 108 MMOL/L (ref 96–112)
CO2 SERPL-SCNC: 23 MMOL/L (ref 20–33)
COMMENT 1642: NORMAL
CREAT SERPL-MCNC: 1.12 MG/DL (ref 0.5–1.4)
EOSINOPHIL # BLD AUTO: 0.16 K/UL (ref 0–0.51)
EOSINOPHIL NFR BLD: 3.3 % (ref 0–6.9)
ERYTHROCYTE [DISTWIDTH] IN BLOOD BY AUTOMATED COUNT: 67.7 FL (ref 35.9–50)
GLUCOSE SERPL-MCNC: 97 MG/DL (ref 65–99)
HCT VFR BLD AUTO: 38.8 % (ref 37–47)
HGB BLD-MCNC: 11.9 G/DL (ref 12–16)
IMM GRANULOCYTES # BLD AUTO: 0.02 K/UL (ref 0–0.11)
IMM GRANULOCYTES NFR BLD AUTO: 0.4 % (ref 0–0.9)
LYMPHOCYTES # BLD AUTO: 1.32 K/UL (ref 1–4.8)
LYMPHOCYTES NFR BLD: 27.3 % (ref 22–41)
MACROCYTES BLD QL SMEAR: ABNORMAL
MCH RBC QN AUTO: 26.7 PG (ref 27–33)
MCHC RBC AUTO-ENTMCNC: 30.7 G/DL (ref 33.6–35)
MCV RBC AUTO: 87 FL (ref 81.4–97.8)
MICROCYTES BLD QL SMEAR: ABNORMAL
MONOCYTES # BLD AUTO: 0.75 K/UL (ref 0–0.85)
MONOCYTES NFR BLD AUTO: 15.5 % (ref 0–13.4)
MORPHOLOGY BLD-IMP: NORMAL
NEUTROPHILS # BLD AUTO: 2.53 K/UL (ref 2–7.15)
NEUTROPHILS NFR BLD: 52.3 % (ref 44–72)
NRBC # BLD AUTO: 0 K/UL
NRBC BLD-RTO: 0 /100 WBC
PLATELET # BLD AUTO: 212 K/UL (ref 164–446)
PLATELET BLD QL SMEAR: NORMAL
PMV BLD AUTO: 10 FL (ref 9–12.9)
POTASSIUM SERPL-SCNC: 3.5 MMOL/L (ref 3.6–5.5)
RBC # BLD AUTO: 4.46 M/UL (ref 4.2–5.4)
RBC BLD AUTO: PRESENT
SODIUM SERPL-SCNC: 139 MMOL/L (ref 135–145)
WBC # BLD AUTO: 4.8 K/UL (ref 4.8–10.8)

## 2018-07-10 PROCEDURE — 96375 TX/PRO/DX INJ NEW DRUG ADDON: CPT

## 2018-07-10 PROCEDURE — 306780 HCHG STAT FOR TRANSFUSION PER CASE

## 2018-07-10 PROCEDURE — A4212 NON CORING NEEDLE OR STYLET: HCPCS

## 2018-07-10 PROCEDURE — G0498 CHEMO EXTEND IV INFUS W/PUMP: HCPCS

## 2018-07-10 PROCEDURE — 85025 COMPLETE CBC W/AUTO DIFF WBC: CPT

## 2018-07-10 PROCEDURE — 700111 HCHG RX REV CODE 636 W/ 250 OVERRIDE (IP): Performed by: NURSE PRACTITIONER

## 2018-07-10 PROCEDURE — 80048 BASIC METABOLIC PNL TOTAL CA: CPT

## 2018-07-10 RX ORDER — DEXAMETHASONE SODIUM PHOSPHATE 4 MG/ML
8 INJECTION, SOLUTION INTRA-ARTICULAR; INTRALESIONAL; INTRAMUSCULAR; INTRAVENOUS; SOFT TISSUE ONCE
Status: COMPLETED | OUTPATIENT
Start: 2018-07-10 | End: 2018-07-10

## 2018-07-10 RX ADMIN — FLUOROURACIL 3570 MG: 50 INJECTION, SOLUTION INTRAVENOUS at 12:44

## 2018-07-10 RX ADMIN — DEXAMETHASONE SODIUM PHOSPHATE 8 MG: 4 INJECTION, SOLUTION INTRAMUSCULAR; INTRAVENOUS at 11:51

## 2018-07-10 ASSESSMENT — PAIN SCALES - GENERAL: PAINLEVEL_OUTOF10: 0

## 2018-07-10 NOTE — PROGRESS NOTES
Chemotherapy Verification - SECONDARY RN       Height = 166 cm  Weight = 75.3 kg  BSA = 1.86 m2       Medication: Fluorouracil (5FU)  Dose: 1920 mg/m2  Calculated Dose: 3571.2 mg over 24 hours                             (In mg/m2, AUC, mg/kg)     I confirm that this process was performed independently.

## 2018-07-10 NOTE — PROGRESS NOTES
"Pharmacy Chemotherapy Verification Note:    Patient Name: Karlene Walters      Dx: Colon CA        Protocol: 5-FU + Leucovorin       IV over 2 hours on Day 1, followed by    IVP on Day 1, followed by         -- 10/31/17: delete Leucovorin and 5-FU push d/t neutropenia per Dr. Hardy   Fluorouracil   IV continuous infusion daily on Days 1 and 2 ( IV over 46-48 hours)        -- 20% reduction (1920 mg/m2) to be continued starting C28 per C Alsop APRN   14 day cycles for 12 cycles (adjuvant) or until disease progression or unacceptable toxicity   NCCN Guidelines for Colon Cancer. V.2.2015.  Jay T, et al. Eur J Cancer.1999;35(9):1343-7.      Allergies:  Codeine; Oxaliplatin; Pcn; Sulfa drugs; and Tape       /69   Pulse 100   Temp 36.6 °C (97.9 °F)   Resp 18   Ht 1.66 m (5' 5.35\")   Wt 75.3 kg (166 lb 0.1 oz)   SpO2 99%   BMI 27.33 kg/m²  Body surface area is 1.86 meters squared.     7/10/18-  ANC~ 2520 Plt = 228k   Hgb = 12.4 SCr = 1.12mg/dL         CrCl ~ 49 mL/min        6/26/18- CMP on even cycles per TP  LFT's = WNL, except AP = 205    TBili = 0.8       Drug Order   (Drug name, dose, route, IV Fluid & volume, frequency, number of doses) Cycle 63 (C55 in [place])  Previous treatment: C62 = 6/26/18     Medication = Fluorouracil (5-FU)   Base Dose = 1920 mg/m²  Calc Dose:Base Dose x 1.86 m² = 3571.2 mg  Final Dose = 3570 mg   Route = CIVI  Drug Conc = 50 mg/mL  Fluid & Volume = 71.4 mL (+ 3 mL overfill)  Admin Duration = CIVI over 46 hrs @ 1.6 mL/hr   Via CADD pump for home infusion        <5% difference, ok to treat with final dose     By my signature below, I confirm this process was performed independently with the BSA and all final chemotherapy dosing calculations congruent. I have reviewed the above chemotherapy order and that my calculation of the final dose and BSA (when applicable) corroborate those calculations of the  pharmacist.     Tiffanie L [place], PharmD, BCOP  "

## 2018-07-10 NOTE — PROGRESS NOTES
Chemotherapy Verification - PRIMARY RN      Height = 166cm  Weight = 75.3kg  BSA = 1.86m2       Medication: Fluorouracil  Dose: 1920mg/m2  Calculated Dose: 3571.2mg over 46 hours                              (In mg/m2, AUC, mg/kg)     I confirm this process was performed independently with the BSA and all final chemotherapy dosing calculations congruent.  Any discrepancies of 5% or greater have been addressed with the chemotherapy pharmacist. The resolution of the discrepancy has been documented in the EPIC progress notes.

## 2018-07-10 NOTE — PROGRESS NOTES
Patient arrived for Cycle 55 5FU CADD pump access.  Pt denies any significant changes or concerns; reports having more and more energy lately.  Port accessed in sterile field, flushed and patent. CBC and BMP drawn and results reviewed; within parameters for treatment.  K 3.5, per APN Alsop, pt replaces with oral potassium.  Will recheck in two weeks.  Pre-med given; CADD pump verified by 2nd RN, connected to port tubing and running.  Confirmed pt has chemo precautions and additional batteries at home; de-access appt confirmed. Pt discharged home in great spirits under no apparent distress.

## 2018-07-11 ENCOUNTER — OFFICE VISIT (OUTPATIENT)
Dept: PULMONOLOGY | Facility: HOSPICE | Age: 78
End: 2018-07-11
Payer: MEDICARE

## 2018-07-11 ENCOUNTER — NON-PROVIDER VISIT (OUTPATIENT)
Dept: PULMONOLOGY | Facility: HOSPICE | Age: 78
End: 2018-07-11
Payer: MEDICARE

## 2018-07-11 VITALS
WEIGHT: 166 LBS | DIASTOLIC BLOOD PRESSURE: 82 MMHG | OXYGEN SATURATION: 98 % | TEMPERATURE: 97.7 F | SYSTOLIC BLOOD PRESSURE: 139 MMHG | BODY MASS INDEX: 27.66 KG/M2 | HEIGHT: 65 IN | RESPIRATION RATE: 16 BRPM | HEART RATE: 86 BPM

## 2018-07-11 VITALS — WEIGHT: 166 LBS | BODY MASS INDEX: 27.33 KG/M2

## 2018-07-11 DIAGNOSIS — R06.02 SHORTNESS OF BREATH: ICD-10-CM

## 2018-07-11 DIAGNOSIS — J43.9 PULMONARY EMPHYSEMA, UNSPECIFIED EMPHYSEMA TYPE (HCC): Chronic | ICD-10-CM

## 2018-07-11 DIAGNOSIS — Z86.711 HISTORY OF PULMONARY EMBOLISM: ICD-10-CM

## 2018-07-11 DIAGNOSIS — C78.7 SECONDARY MALIGNANT NEOPLASM OF LIVER (HCC): ICD-10-CM

## 2018-07-11 DIAGNOSIS — C20 RECTAL CANCER (HCC): ICD-10-CM

## 2018-07-11 PROCEDURE — 99214 OFFICE O/P EST MOD 30 MIN: CPT | Mod: 25 | Performed by: NURSE PRACTITIONER

## 2018-07-11 PROCEDURE — 94726 PLETHYSMOGRAPHY LUNG VOLUMES: CPT | Performed by: INTERNAL MEDICINE

## 2018-07-11 PROCEDURE — 94060 EVALUATION OF WHEEZING: CPT | Performed by: INTERNAL MEDICINE

## 2018-07-11 PROCEDURE — 94729 DIFFUSING CAPACITY: CPT | Performed by: INTERNAL MEDICINE

## 2018-07-11 RX ORDER — DEXAMETHASONE SODIUM PHOSPHATE 4 MG/ML
8 INJECTION, SOLUTION INTRA-ARTICULAR; INTRALESIONAL; INTRAMUSCULAR; INTRAVENOUS; SOFT TISSUE ONCE
Status: CANCELLED | OUTPATIENT
Start: 2018-07-24 | End: 2018-07-24

## 2018-07-11 RX ORDER — ONDANSETRON 2 MG/ML
4 INJECTION INTRAMUSCULAR; INTRAVENOUS
Status: CANCELLED | OUTPATIENT
Start: 2018-07-24

## 2018-07-11 RX ORDER — ONDANSETRON 8 MG/1
8 TABLET, ORALLY DISINTEGRATING ORAL
Status: CANCELLED | OUTPATIENT
Start: 2018-07-24

## 2018-07-11 RX ORDER — LIDOCAINE HYDROCHLORIDE 10 MG/ML
10 INJECTION, SOLUTION INFILTRATION; PERINEURAL ONCE
Status: CANCELLED | OUTPATIENT
Start: 2018-07-24 | End: 2018-07-24

## 2018-07-11 RX ORDER — PROCHLORPERAZINE MALEATE 10 MG
10 TABLET ORAL EVERY 6 HOURS PRN
Status: CANCELLED | OUTPATIENT
Start: 2018-07-24

## 2018-07-11 RX ORDER — DEXTROSE MONOHYDRATE 50 MG/ML
INJECTION, SOLUTION INTRAVENOUS CONTINUOUS
Status: CANCELLED | OUTPATIENT
Start: 2018-07-24

## 2018-07-11 ASSESSMENT — PULMONARY FUNCTION TESTS
FVC_PREDICTED: 2.81
FEV1/FVC_PERCENT_CHANGE: 1
FVC: 2.8
FEV1/FVC: 84
FEV1/FVC_PERCENT_PREDICTED: 114
FEV1/FVC_PERCENT_PREDICTED: 114
FEV1/FVC: 85
FEV1/FVC: 85
FEV1_PERCENT_CHANGE: -3
FEV1/FVC_PERCENT_LLN: 62
FVC: 2.9
FEV1: 2.43
FEV1/FVC_PERCENT_PREDICTED: 75
FEV1/FVC_PERCENT_PREDICTED: 113
FEV1/FVC_PERCENT_PREDICTED: 112
FEV1/FVC_PERCENT_CHANGE: 33
FEV1_PERCENT_CHANGE: -1
FEV1_PREDICTED: 2.1
FEV1/FVC_PREDICTED: 74
FEV1_PERCENT_PREDICTED: 115
FVC_LLN: 2.35
FEV1_PERCENT_PREDICTED: 113
FEV1_LLN: 1.75
FEV1: 2.38
FVC_PERCENT_PREDICTED: 103
FVC_PERCENT_PREDICTED: 99
FEV1/FVC: 84

## 2018-07-11 NOTE — PROCEDURES
Technician Monica Bryant, [facility] Comments:Good patient effort & cooperation.  The results of this test meet the ATS/ERS standards for acceptability & reproducibility.  Test was performed on the [employer] Body Plethysmograph-Elite DX system.  Predicted values were [facility]-3 for spirometry, [facility] for DLCO, ITS for Lung Volumes.  The DLCO was uncorrected for Hgb.  A bronchodilator of Ventolin HFA -2puffs via spacer administered.  DLCO performed during dilation period.    Interpretation:    SPIROMETRY:  1. FVC was 2.8L, 99% of predicted  2. FEV1 was 2.38 L, 113 % of predicted   3. FEV1/FVC ratio was 85%  4. There was no significant response to bronchodilators   5. Flow volume loop normal in shape and size    LUNG VOLUMES:  1. RV was  96% of predicted   2. TLC was 101 % of predicted       DIFFUSION CAPACITY:  1.Diffusion capacity was 98 % of predicted       IMPRESSION:   The patient has normal PFT clinical correlation is required

## 2018-07-11 NOTE — PROGRESS NOTES
Chief Complaint   Patient presents with   • Annual Exam       HPI:  Karlene Walters is a 78 y.o. year old female here today for follow-up on COPD per Dr. Warren's prior assessment but no hx of smoking - most likely chronic obstructive asthma. History of rectal cancer with metastasis to the liver - per recent CT, no change or advancement of disease. Patient is currently under the care of Dr. Hardy. She continues to receive chemotherapy. She has had complaints of dyspnea on exertion with no clear etiology. CT scan of chest 6/2018 showed no evidence of PE or masses or nodules. No metastasis. Hx of PE remains on Xarelto 10mg daily. PFT indicated normal spirometry 7/11/18 FEV1 2.43L or 115%, DLCO 98%. Echo indicated normal LV function. She has never smoked. HRCT of chest was performed previously  to rule out ILD especially in light of her history of cancer and chemotherapy. This indicated emphysematous lungs and some areas of basilar atelectasis but no evidence of ILD. She was started on Stiolto 2 puffs QD and MATEO. She feels her breathing has improved because she'll walk further before having to pace herself and sit down. She has a mild cough with rare clear phlegm in the AM and no wheezing. She notes chronic postnasal drip. She overall feels well but fatigued from the chemotherapy. She feels the inhalers are beneficial.     ROS: As per HPI and otherwise negative if not stated.    Past Medical History:   Diagnosis Date   • Arrhythmia    • Blood clotting disorder (HCC) 08/2015    bilat PE    • Blood transfusion 1957   • Cancer (HCC) 09/2012    rectal cancer,  Liver CA-2015   • CATARACT     removed b/l   • Chemotherapy-induced neutropenia (HCC) 9/28/2016   • Chickenpox    • Colon cancer (HCC)    • Dental disorder     dentures UPPERS AND LOWERS   • Elevated alkaline phosphatase level 11/15/2017   • Fall    • [place] measles    • Hemorrhagic disorder (HCC)     on Xarelto    • Hypercholesteremia    • Hypertension     no  meds currently    • Ileostomy in place (HCC)    • Indigestion    • Influenza    • Lightheadedness 9/17/2015   • Mumps    • Obstruction     ileostomy   • Other specified symptom associated with female genital organs     HYSTERECTOMY 1993   • Pneumonia 05/2017    tx'd with antibx   • Pulmonary embolism (HCC)    • 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)    • Renal insufficiency 3/14/2016   • Routine general medical examination at a health care facility 3/14/2016    3/14/16   • Seasonal allergies    • Tonsillitis        Past Surgical History:   Procedure Laterality Date   • COLONOSCOPY WITH BIOPSY  8/23/2015    Procedure: COLONOSCOPY WITH BIOPSY;  Surgeon: Wilbur Glasgow M.D.;  Location: ENDOSCOPY [facility];  Service:    • HEPATIC ABLATION LAPAROSCOPIC  7/6/2015    Procedure: HEPATIC ABLATION LAPAROSCOPIC.;  Surgeon: Kit West M.D.;  Location: SURGERY [facility];  Service:    • CATH PLACEMENT Left 7/6/2015    Procedure: CATH PLACEMENT CEPHALIC POWERPORT;  Surgeon: Kit West M.D.;  Location: SURGERY [facility];  Service:    • FISTULA IN ANO REPAIR  1/28/2015    Performed by Kolton Montgomery M.D. at SURGERY [facility]   • PERINEAL PROCEDURE  1/28/2015    Performed by Kolton Montgomery M.D. at [facility]   • FISTULA IN ANO REPAIR  10/15/2014    Performed by Kolton Montgomery M.D. at [facility]   • PERINEAL PROCEDURE  10/15/2014    Performed by Kolton Montgomery M.D. at SURGERY [facility]   • IRRIGATION & DEBRIDEMENT GENERAL  2/19/2014    Performed by Kolton Montgomery M.D. at [facility]   • FLAP GRAFT  2/19/2014    Performed by Kolton Montgomery M.D. at [facility]   • PERINEAL PROCEDURE  12/18/2013    Performed by Kolton Montgomery M.D. at [facility]   • MYOCUTANEOUS FLAP  12/18/2013    Performed by Kolton Montgomery M.D. at [facility]   • IRRIGATION & DEBRIDEMENT GENERAL  10/23/2013    Performed by Kolton Montgomery  M.D. at SURGERY [facility]   • FISTULA IN ANO REPAIR  9/4/2013    Performed by Kolton Montgomery M.D. at SURGERY [facility]   • FLAP ROTATION  9/4/2013    Performed by Kolton Montgomery M.D. at SURGERY [facility]   • RECOVERY  8/26/2013    Performed by [facility] at [facility] SAME DAY [facility]   • PROCTOSCOPY  7/17/2013    Performed by Kolton Montgomery M.D. at SURGERY [facility]   • BIOPSY GENERAL  7/17/2013    Performed by Kolton Montgomery M.D. at [facility]   • SIGMOIDOSCOPY  2/27/2013    Performed by Kolton Montgomery M.D. at SURGERY [facility]   • FISTULA IN ANO REPAIR  2/27/2013    Performed by Kolton Montgomery M.D. at [facility]   • LAPAROSCOPY  10/8/2012    Performed by Kolton Montgomery M.D. at SURGERY [facility]   • ILEO LOOP DIVERSION  10/8/2012    Performed by Kolton Montgomery M.D. at SURGERY [facility]   • COLECTOMY  9/26/2012    Performed by Kolton Montgomery M.D. at SURGERY [facility]   • COLONOSCOPY FLEX W/ENDO US  9/20/2012    Performed by Harshil Bolton M.D. at [facility]   • OTHER  2009    left eye torn retina repair   • CATARACT EXTRACTION WITH IOL  2004    bilateral   • ABDOMINAL HYSTERECTOMY TOTAL  1983   • CYST EXCISION  1972    ovarian   • COLON RESECTION     • EYE SURGERY      cataracts   • HYSTERECTOMY LAPAROSCOPY     • PB REMV 2ND CATARACT,CORN-SCLER SECTN     • TONSILLECTOMY         Family History   Problem Relation Age of Onset   • Heart Disease Mother    • Heart Failure Mother    • Hypertension Mother    • Hyperlipidemia Mother    • Cancer Mother    • Cancer Maternal Aunt 60     breast ca   • Lung Disease Brother      COPD/Emphysema/Smoker   • Cancer Brother      prostate cancer   • Alcohol/Drug Brother      Alcohol   • Kidney Disease Father      renal failure       Social History     Social History   • Marital status:      Spouse name: N/A   • Number of children: N/A   • Years of education: N/A     Occupational History  "  • Not on file.     Social History Main Topics   • Smoking status: Never Smoker   • Smokeless tobacco: Never Used   • Alcohol use No      Comment: rare   • Drug use: No   • Sexual activity: No     Other Topics Concern   • Not on file     Social History Narrative   • No narrative on file       Allergies as of 07/11/2018 - Reviewed 07/11/2018   Allergen Reaction Noted   • Codeine  04/05/2011   • Oxaliplatin Anaphylaxis 10/27/2015   • Pcn [penicillins] Itching 04/05/2011   • Sulfa drugs Itching 04/05/2011   • Tape Rash 09/04/2013        @Vital signs for this encounter:  Vitals:    07/11/18 1403   Height: 1.66 m (5' 5.35\")   Weight: 75.3 kg (166 lb)   Weight % change since last entry.: 0 %   BP: 139/82   Pulse: 86   BMI (Calculated): 27.33   Resp: 16   Temp: 36.5 °C (97.7 °F)   O2 sat % room air: 98 %       Current medications as of today   Current Outpatient Prescriptions   Medication Sig Dispense Refill   • raNITidine (ZANTAC) 150 MG Tab TAKE 1 TABLET 2 TIMES A DAY 60 Tab 3   • rivaroxaban (XARELTO) 10 MG Tab tablet Take 1 Tab by mouth every day. 30 Tab 6   • Tiotropium Bromide-Olodaterol (STIOLTO RESPIMAT) 2.5-2.5 MCG/ACT Aero Soln Take 2 Puffs by mouth every day. 1 Inhaler 11   • cyanocobalamin (VITAMIN B-12) 500 MCG Tab Take 500 mcg by mouth every day.     • Multiple Vitamins-Minerals (CENTRUM SILVER PO) Take  by mouth.     • Cholecalciferol (VITAMIN D3) 400 UNITS CAPS Take 1 Cap by mouth every day.     • loratadine (CLARITIN) 10 MG TABS Take 10 mg by mouth every day. Indications: Hayfever     • oxyCODONE immediate-release (ROXICODONE) 5 MG Tab Take 1 Tab by mouth every four hours as needed for Severe Pain for up to 30 days. 30 Tab 0   • potassium chloride ER (KLOR-CON) 10 MEQ tablet TAKE ONE TABLET EVERY DAY AS NEEDED 30 Tab 6   • nitroglycerin (NITROSTAT) 0.4 MG SL Tab Place 1 Tab under tongue as needed for Chest Pain. 25 Tab 0   • loperamide (IMODIUM) 2 MG Cap Take 2 mg by mouth 4 times a day as needed for " Diarrhea.     • lidocaine-prilocaine (EMLA) 2.5-2.5 % Cream APPLY A THICK FILM OVER THE PORT ACCESS SITE PRIOR TO ACCESS 30 g 2   • albuterol (PROAIR HFA) 108 (90 Base) MCG/ACT Aero Soln inhalation aerosol Inhale 2 Puffs by mouth every four hours as needed for Shortness of Breath (wheezing). 1 Inhaler 11   • furosemide (LASIX) 40 MG Tab Take 1 Tab by mouth every day. 30 Tab 11   • ondansetron (ZOFRAN) 4 MG Tab tablet        Current Facility-Administered Medications   Medication Dose Route Frequency Provider Last Rate Last Dose   • heparin pf injection 500 Units  500 Units Intracatheter PRN Eric Hardy M.D.   500 Units at 03/09/18 1542   • heparin pf injection 500 Units  500 Units Intracatheter PRN Ella De A.PAngelaNAngela   500 Units at 08/22/17 0856         Physical Exam:   Gen:           Alert and oriented, No apparent distress. Mood and affect appropriate, normal interaction with examiner.  Eyes:          PERRL, EOM intact, sclere white, conjunctive moist.  Ears:          Not examined.  Hearing:     Grossly intact.  Nose:          Normal, no lesions or deformities.  Dentition:    Good dentition.  Oropharynx:   Tongue normal, posterior pharynx without erythema or exudate.  Mallampati Classification: 2  Neck:        Supple, trachea midline, no masses.  Respiratory Effort: No intercostal retractions or use of accessory muscles.   Lung Auscultation:      Clear to auscultation bilaterally; no rales, rhonchi or wheezing.  CV:            Regular rate and rhythm. No murmurs, rubs or gallops.  Abd:           Not examined.   Lymphadenopathy: Not examined.  Gait and Station: Normal.  Digits and Nails: No clubbing, cyanosis, petechiae, or nodes.   Cranial Nerves: II-XII grossly intact.  Skin:        No rashes, lesions or ulcers noted.               Ext:           No cyanosis or edema.      Assessment:  1. Pulmonary emphysema, unspecified emphysema type (HCC)     2. Rectal cancer (HCC)     3. History of pulmonary embolism      4. Secondary malignant neoplasm of liver (HCC)     5. BMI 27.0-27.9,adult         Immunizations:    Flu:2/2018  Pneumovax 23:2006  Prevnar 13:2018    Plan:  1. Continue inhaler therapy. Advised to use MATEO prior to exertional activities.  2. Discussed respiratory hygiene.  3. Follow up in 6 months, sooner if needed.

## 2018-07-12 ENCOUNTER — OUTPATIENT INFUSION SERVICES (OUTPATIENT)
Dept: ONCOLOGY | Facility: MEDICAL CENTER | Age: 78
End: 2018-07-12
Attending: INTERNAL MEDICINE
Payer: MEDICARE

## 2018-07-12 VITALS
SYSTOLIC BLOOD PRESSURE: 107 MMHG | TEMPERATURE: 98.5 F | DIASTOLIC BLOOD PRESSURE: 56 MMHG | HEART RATE: 86 BPM | RESPIRATION RATE: 18 BRPM | WEIGHT: 168.87 LBS | BODY MASS INDEX: 28.14 KG/M2 | OXYGEN SATURATION: 100 % | HEIGHT: 65 IN

## 2018-07-12 DIAGNOSIS — C20 RECTAL CANCER (HCC): ICD-10-CM

## 2018-07-12 DIAGNOSIS — R94.4 DECREASED GFR: ICD-10-CM

## 2018-07-12 PROCEDURE — 96523 IRRIG DRUG DELIVERY DEVICE: CPT

## 2018-07-12 PROCEDURE — 700111 HCHG RX REV CODE 636 W/ 250 OVERRIDE (IP)

## 2018-07-12 RX ADMIN — HEPARIN 500 UNITS: 100 SYRINGE at 13:29

## 2018-07-12 ASSESSMENT — PAIN SCALES - GENERAL: PAINLEVEL_OUTOF10: 0

## 2018-07-12 NOTE — PROGRESS NOTES
Patient arrived to [facility] for C55 D3 5FU CADD pump de-access.  Pump stopped with 0ml remaining in reservoir, 72.5 mLs administered.  Disconnected pump, Port flushed per protocol with brisk blood return noted, heparinized, de-accessed and gauze dressing applied.  Pump cleaned and placed in pharmacy drawer.  Confirmed pt's next appt. Pt discharged home in [facility].

## 2018-07-17 DIAGNOSIS — R12 HEARTBURN: ICD-10-CM

## 2018-07-17 DIAGNOSIS — C20 RECTAL CANCER (HCC): ICD-10-CM

## 2018-07-17 RX ORDER — RANITIDINE 150 MG/1
TABLET ORAL
Qty: 60 TAB | Refills: 3 | Status: SHIPPED | OUTPATIENT
Start: 2018-07-17 | End: 2018-11-12 | Stop reason: SDUPTHER

## 2018-07-23 NOTE — PROGRESS NOTES
"Pharmacy Chemotherapy Verification Note:    Patient Name: Karlene Walters      Dx: Colon CA        Cycle 64   Previous treatment: C63 = 7/10/18    Protocol: 5-FU+Leucovorin       IV over 2 hours on Day 1, followed by   IVP on Day 1, followed by    - Dose reduced by 20% to 320 mg/m² starting with Cycle 18 ([place] #11) on 7/5/16 due to neutropenia    10/31/17: delete Leucovorin and 5-FU push d/t neutropenia per Dr. Hardy   Fluorouracil 1200 mg/m²  IV continuous infusion daily on Days 1 and 2 (2400 mg/m² IV over 46-48 hours) -    - Infusion Dose reduced by 20% starting with Cycle 18 ([place] #11) on 7/5/16 due to neutropenia   -20% reduction (1920 mg/m2) to be continued starting C28 per C Alsop APRN   14 day cycles for 12 cycles (adjuvant) or until disease progression or unacceptable toxicity  NCCN Guidelines for Colon Cancer. V.2.2015.  Jay T, et al. Eur J Cancer.1999;35(9):1343-7.      Allergies:  Codeine; Oxaliplatin; Pcn; Sulfa drugs; and Tape     /68   Pulse (!) 105   Temp 36.4 °C (97.6 °F)   Resp 18   Ht 1.66 m (5' 5.35\")   Wt 76.2 kg (167 lb 15.9 oz)   SpO2 99%   BMI 27.65 kg/m²    Body surface area is 1.87 meters squared.     ANC~ 2770 Plt = 200k   Hgb = 12.2     SCr = 1.08mg/dL CrCl ~ 52mL/min   LFT's = WNL, except AP = 182 TBili = 1       CMP on even cycles per TP       Fluorouracil (5-FU) 1920 mg/m² x  1.87m² = 3590mg              <5% difference, OK to treat with final dose = 3590mg    CIVI over 46 hours Via home infusion pump at 1.6ml/hr                   Nakia Arroyo, PharmD    "

## 2018-07-24 ENCOUNTER — OFFICE VISIT (OUTPATIENT)
Dept: HEMATOLOGY ONCOLOGY | Facility: MEDICAL CENTER | Age: 78
End: 2018-07-24
Payer: MEDICARE

## 2018-07-24 ENCOUNTER — OUTPATIENT INFUSION SERVICES (OUTPATIENT)
Dept: ONCOLOGY | Facility: MEDICAL CENTER | Age: 78
End: 2018-07-24
Attending: INTERNAL MEDICINE
Payer: MEDICARE

## 2018-07-24 VITALS
DIASTOLIC BLOOD PRESSURE: 68 MMHG | HEART RATE: 105 BPM | TEMPERATURE: 97.6 F | OXYGEN SATURATION: 99 % | WEIGHT: 167.99 LBS | BODY MASS INDEX: 27.99 KG/M2 | HEIGHT: 65 IN | RESPIRATION RATE: 18 BRPM | SYSTOLIC BLOOD PRESSURE: 139 MMHG

## 2018-07-24 VITALS
OXYGEN SATURATION: 97 % | WEIGHT: 168.21 LBS | HEART RATE: 99 BPM | SYSTOLIC BLOOD PRESSURE: 132 MMHG | DIASTOLIC BLOOD PRESSURE: 78 MMHG | BODY MASS INDEX: 28.03 KG/M2 | TEMPERATURE: 96.9 F | HEIGHT: 65 IN | RESPIRATION RATE: 18 BRPM

## 2018-07-24 VITALS
HEIGHT: 65 IN | WEIGHT: 167.99 LBS | SYSTOLIC BLOOD PRESSURE: 139 MMHG | RESPIRATION RATE: 18 BRPM | BODY MASS INDEX: 27.99 KG/M2 | OXYGEN SATURATION: 99 % | TEMPERATURE: 97.6 F | HEART RATE: 105 BPM | DIASTOLIC BLOOD PRESSURE: 68 MMHG

## 2018-07-24 DIAGNOSIS — G89.3 NEOPLASM RELATED PAIN: ICD-10-CM

## 2018-07-24 DIAGNOSIS — M25.562 ACUTE PAIN OF LEFT KNEE: ICD-10-CM

## 2018-07-24 DIAGNOSIS — Z93.2 ILEOSTOMY IN PLACE (HCC): ICD-10-CM

## 2018-07-24 DIAGNOSIS — C78.7 SECONDARY MALIGNANT NEOPLASM OF LIVER (HCC): ICD-10-CM

## 2018-07-24 DIAGNOSIS — R97.0 ELEVATED CEA: ICD-10-CM

## 2018-07-24 DIAGNOSIS — C20 RECTAL CANCER (HCC): ICD-10-CM

## 2018-07-24 DIAGNOSIS — Z86.711 HISTORY OF PULMONARY EMBOLISM: ICD-10-CM

## 2018-07-24 DIAGNOSIS — R94.4 DECREASED GFR: ICD-10-CM

## 2018-07-24 DIAGNOSIS — Z51.11 ENCOUNTER FOR ANTINEOPLASTIC CHEMOTHERAPY: ICD-10-CM

## 2018-07-24 DIAGNOSIS — L81.9 DISCOLORATION OF SKIN: ICD-10-CM

## 2018-07-24 LAB
ALBUMIN SERPL BCP-MCNC: 4.1 G/DL (ref 3.2–4.9)
ALBUMIN/GLOB SERPL: 1.5 G/DL
ALP SERPL-CCNC: 182 U/L (ref 30–99)
ALT SERPL-CCNC: 28 U/L (ref 2–50)
ANION GAP SERPL CALC-SCNC: 11 MMOL/L (ref 0–11.9)
ANISOCYTOSIS BLD QL SMEAR: ABNORMAL
AST SERPL-CCNC: 34 U/L (ref 12–45)
BASOPHILS # BLD AUTO: 1.1 % (ref 0–1.8)
BASOPHILS # BLD: 0.05 K/UL (ref 0–0.12)
BILIRUB SERPL-MCNC: 1 MG/DL (ref 0.1–1.5)
BUN SERPL-MCNC: 20 MG/DL (ref 8–22)
CALCIUM SERPL-MCNC: 9.5 MG/DL (ref 8.5–10.5)
CEA SERPL-MCNC: 6.5 NG/ML (ref 0–3)
CHLORIDE SERPL-SCNC: 104 MMOL/L (ref 96–112)
CO2 SERPL-SCNC: 24 MMOL/L (ref 20–33)
COMMENT 1642: NORMAL
CREAT SERPL-MCNC: 1.08 MG/DL (ref 0.5–1.4)
EOSINOPHIL # BLD AUTO: 0.13 K/UL (ref 0–0.51)
EOSINOPHIL NFR BLD: 2.9 % (ref 0–6.9)
ERYTHROCYTE [DISTWIDTH] IN BLOOD BY AUTOMATED COUNT: 67.6 FL (ref 35.9–50)
GLOBULIN SER CALC-MCNC: 2.7 G/DL (ref 1.9–3.5)
GLUCOSE SERPL-MCNC: 122 MG/DL (ref 65–99)
HCT VFR BLD AUTO: 38.6 % (ref 37–47)
HGB BLD-MCNC: 12.2 G/DL (ref 12–16)
IMM GRANULOCYTES # BLD AUTO: 0.02 K/UL (ref 0–0.11)
IMM GRANULOCYTES NFR BLD AUTO: 0.4 % (ref 0–0.9)
LG PLATELETS BLD QL SMEAR: NORMAL
LYMPHOCYTES # BLD AUTO: 0.97 K/UL (ref 1–4.8)
LYMPHOCYTES NFR BLD: 21.3 % (ref 22–41)
MACROCYTES BLD QL SMEAR: ABNORMAL
MCH RBC QN AUTO: 27.4 PG (ref 27–33)
MCHC RBC AUTO-ENTMCNC: 31.6 G/DL (ref 33.6–35)
MCV RBC AUTO: 86.5 FL (ref 81.4–97.8)
MICROCYTES BLD QL SMEAR: ABNORMAL
MONOCYTES # BLD AUTO: 0.62 K/UL (ref 0–0.85)
MONOCYTES NFR BLD AUTO: 13.6 % (ref 0–13.4)
MORPHOLOGY BLD-IMP: NORMAL
NEUTROPHILS # BLD AUTO: 2.77 K/UL (ref 2–7.15)
NEUTROPHILS NFR BLD: 60.7 % (ref 44–72)
NRBC # BLD AUTO: 0 K/UL
NRBC BLD-RTO: 0 /100 WBC
PLATELET # BLD AUTO: 200 K/UL (ref 164–446)
PLATELET BLD QL SMEAR: NORMAL
PMV BLD AUTO: 10.5 FL (ref 9–12.9)
POLYCHROMASIA BLD QL SMEAR: NORMAL
POTASSIUM SERPL-SCNC: 3.7 MMOL/L (ref 3.6–5.5)
PROT SERPL-MCNC: 6.8 G/DL (ref 6–8.2)
RBC # BLD AUTO: 4.46 M/UL (ref 4.2–5.4)
RBC BLD AUTO: PRESENT
SODIUM SERPL-SCNC: 139 MMOL/L (ref 135–145)
WBC # BLD AUTO: 4.6 K/UL (ref 4.8–10.8)

## 2018-07-24 PROCEDURE — 85025 COMPLETE CBC W/AUTO DIFF WBC: CPT

## 2018-07-24 PROCEDURE — G0498 CHEMO EXTEND IV INFUS W/PUMP: HCPCS

## 2018-07-24 PROCEDURE — 80053 COMPREHEN METABOLIC PANEL: CPT

## 2018-07-24 PROCEDURE — 96374 THER/PROPH/DIAG INJ IV PUSH: CPT | Mod: XU

## 2018-07-24 PROCEDURE — 700111 HCHG RX REV CODE 636 W/ 250 OVERRIDE (IP)

## 2018-07-24 PROCEDURE — 306780 HCHG STAT FOR TRANSFUSION PER CASE

## 2018-07-24 PROCEDURE — 700111 HCHG RX REV CODE 636 W/ 250 OVERRIDE (IP): Performed by: NURSE PRACTITIONER

## 2018-07-24 PROCEDURE — 82378 CARCINOEMBRYONIC ANTIGEN: CPT

## 2018-07-24 PROCEDURE — 99214 OFFICE O/P EST MOD 30 MIN: CPT | Performed by: NURSE PRACTITIONER

## 2018-07-24 PROCEDURE — A4212 NON CORING NEEDLE OR STYLET: HCPCS

## 2018-07-24 RX ORDER — DEXAMETHASONE SODIUM PHOSPHATE 4 MG/ML
8 INJECTION, SOLUTION INTRA-ARTICULAR; INTRALESIONAL; INTRAMUSCULAR; INTRAVENOUS; SOFT TISSUE ONCE
Status: COMPLETED | OUTPATIENT
Start: 2018-07-24 | End: 2018-07-24

## 2018-07-24 RX ADMIN — HEPARIN 500 UNITS: 100 SYRINGE at 08:28

## 2018-07-24 RX ADMIN — FLUOROURACIL 3590 MG: 50 INJECTION, SOLUTION INTRAVENOUS at 11:00

## 2018-07-24 RX ADMIN — DEXAMETHASONE SODIUM PHOSPHATE 8 MG: 4 INJECTION, SOLUTION INTRAMUSCULAR; INTRAVENOUS at 10:29

## 2018-07-24 ASSESSMENT — ENCOUNTER SYMPTOMS
DIARRHEA: 0
MYALGIAS: 0
VOMITING: 0
NAUSEA: 0
HEADACHES: 0
INSOMNIA: 0
CONSTIPATION: 0
COUGH: 1
FEVER: 0
CHILLS: 0
WHEEZING: 0
PALPITATIONS: 0
WEIGHT LOSS: 0
TINGLING: 1
DIZZINESS: 0
SHORTNESS OF BREATH: 0

## 2018-07-24 ASSESSMENT — PAIN SCALES - GENERAL
PAINLEVEL_OUTOF10: 2
PAINLEVEL: 2=MINIMAL-SLIGHT
PAINLEVEL_OUTOF10: 2

## 2018-07-24 NOTE — PROGRESS NOTES
"Subjective:      Karlene Walters is a 78 y.o. female who presents for Follow-Up (prechemo ([place])) evaluation prior to cycle 56 of single agent 5-FU for metastatic rectal cancer      HPI   Mrs. Walters presents for evaluation prior to cycle 56 single agent 5-FU for metastatic rectal cancer to the liver. She continues with 20% dose reduction since cycle 21 (12/2016) and discontinuation of bolus 5-FU/LV at cycle 39, due to neutropenia and poor tolerance. She is accompanied by her  for today's visit.    She continues tolerating treatment very well.  She continues with dry cough that she associates with COPD and air conditioning.  She continues to experience intermittent tingling at fingertips, which remains self-limiting.  In regards to chemotherapy, she is otherwise asymptomatic.    Patient reports dimpling and slight discoloration at left cheek, she was previously referred to dermatology desires referral to [facility] provider.  She notes left knee pain for the past 2 weeks that is not able to be correlated with the injury.      Allergies   Allergen Reactions   • Codeine      \"gets drunk\"   • Oxaliplatin Anaphylaxis   • Pcn [Penicillins] Itching   • Sulfa Drugs Itching   • Tape Rash     PAPER TAPE OK       Current Outpatient Prescriptions on File Prior to Visit   Medication Sig Dispense Refill   • raNITidine (ZANTAC) 150 MG Tab TAKE 1 TABLET 2 TIMES A DAY 60 Tab 3   • oxyCODONE immediate-release (ROXICODONE) 5 MG Tab Take 1 Tab by mouth every four hours as needed for Severe Pain for up to 30 days. 30 Tab 0   • potassium chloride ER (KLOR-CON) 10 MEQ tablet TAKE ONE TABLET EVERY DAY AS NEEDED 30 Tab 6   • nitroglycerin (NITROSTAT) 0.4 MG SL Tab Place 1 Tab under tongue as needed for Chest Pain. 25 Tab 0   • loperamide (IMODIUM) 2 MG Cap Take 2 mg by mouth 4 times a day as needed for Diarrhea.     • rivaroxaban (XARELTO) 10 MG Tab tablet Take 1 Tab by mouth every day. 30 Tab 6   • lidocaine-prilocaine (EMLA) " 2.5-2.5 % Cream APPLY A THICK FILM OVER THE PORT ACCESS SITE PRIOR TO ACCESS 30 g 2   • Tiotropium Bromide-Olodaterol (STIOLTO RESPIMAT) 2.5-2.5 MCG/ACT Aero Soln Take 2 Puffs by mouth every day. 1 Inhaler 11   • albuterol (PROAIR HFA) 108 (90 Base) MCG/ACT Aero Soln inhalation aerosol Inhale 2 Puffs by mouth every four hours as needed for Shortness of Breath (wheezing). 1 Inhaler 11   • furosemide (LASIX) 40 MG Tab Take 1 Tab by mouth every day. 30 Tab 11   • ondansetron (ZOFRAN) 4 MG Tab tablet      • cyanocobalamin (VITAMIN B-12) 500 MCG Tab Take 500 mcg by mouth every day.     • Multiple Vitamins-Minerals (CENTRUM SILVER PO) Take  by mouth.     • Cholecalciferol (VITAMIN D3) 400 UNITS CAPS Take 1 Cap by mouth every day.     • loratadine (CLARITIN) 10 MG TABS Take 10 mg by mouth every day. Indications: Hayfever       Current Facility-Administered Medications on File Prior to Visit   Medication Dose Route Frequency Provider Last Rate Last Dose   • fluorouracil (ADRUCIL) 3,590 mg in CADD Cassette/Bag Chemo infusion (for use in CADD PUMP)  1,920 mg/m2 Intravenous Once Ella De, A.P.N.   3,590 mg at 07/24/18 1100   • heparin pf injection 500 Units  500 Units Intracatheter PRN Eric Hardy M.D.   500 Units at 03/09/18 1542   • heparin pf injection 500 Units  500 Units Intracatheter PRN Ella De, A.P.N.   500 Units at 08/22/17 0856       Review of Systems   Constitutional: Positive for malaise/fatigue (stable). Negative for chills, fever and weight loss.   Respiratory: Positive for cough (dry, associated wit A/C). Negative for shortness of breath and wheezing.    Cardiovascular: Negative for chest pain, palpitations and leg swelling.   Gastrointestinal: Negative for constipation, diarrhea, nausea and vomiting.        Ileostomy output stable   Genitourinary: Negative for dysuria.   Musculoskeletal: Positive for joint pain (left knee x 2 weeks - no injury recalled). Negative for myalgias.  "  Neurological: Positive for tingling (tips of fingers - subsides quickly - variable each treatment). Negative for dizziness and headaches.   Psychiatric/Behavioral: The patient does not have insomnia.           Objective:     /78   Pulse 99   Temp 36.1 °C (96.9 °F)   Resp 18   Ht 1.66 m (5' 5.35\")   Wt 76.3 kg (168 lb 3.4 oz)   SpO2 97%   Breastfeeding? No   BMI 27.69 kg/m²      Physical Exam   Constitutional: She is oriented to person, place, and time. She appears well-developed and well-nourished. No distress.   HENT:   Head: Normocephalic and atraumatic.   Mouth/Throat: Oropharynx is clear and moist. No oropharyngeal exudate.   Eyes: Conjunctivae and EOM are normal. Pupils are equal, round, and reactive to light. Right eye exhibits no discharge. Left eye exhibits no discharge. No scleral icterus.   Neck: Normal range of motion. Neck supple. No thyromegaly present.   Cardiovascular: Normal rate, regular rhythm, normal heart sounds and intact distal pulses.  Exam reveals no gallop and no friction rub.    No murmur heard.  Pulmonary/Chest: Effort normal and breath sounds normal. No respiratory distress. She has no wheezes.   Abdominal: Soft. Bowel sounds are normal. She exhibits no distension. There is no tenderness.   Musculoskeletal: Normal range of motion. She exhibits tenderness (left knee - mild edema, tender medially and posteriorly). She exhibits no edema.   Lymphadenopathy:        Head (right side): No submental, no submandibular, no tonsillar, no preauricular, no posterior auricular and no occipital adenopathy present.        Head (left side): No submental, no submandibular, no tonsillar, no preauricular, no posterior auricular and no occipital adenopathy present.     She has no cervical adenopathy.        Right cervical: No superficial cervical and no deep cervical adenopathy present.       Left cervical: No superficial cervical and no deep cervical adenopathy present.        Right: No " supraclavicular adenopathy present.        Left: No supraclavicular adenopathy present.   Neurological: She is alert and oriented to person, place, and time.   Skin: Skin is warm and dry. No rash noted. She is not diaphoretic. No erythema. No pallor.   Slight dimpling and discoloration noted at left cheek   Psychiatric: She has a normal mood and affect. Her behavior is normal.   Vitals reviewed.      Outpatient Infusion Services on 07/24/2018   Component Date Value Ref Range Status   • WBC 07/24/2018 4.6* 4.8 - 10.8 K/uL Final   • RBC 07/24/2018 4.46  4.20 - 5.40 M/uL Final   • Hemoglobin 07/24/2018 12.2  12.0 - 16.0 g/dL Final   • Hematocrit 07/24/2018 38.6  37.0 - 47.0 % Final   • MCV 07/24/2018 86.5  81.4 - 97.8 fL Final   • MCH 07/24/2018 27.4  27.0 - 33.0 pg Final   • MCHC 07/24/2018 31.6* 33.6 - 35.0 g/dL Final   • RDW 07/24/2018 67.6* 35.9 - 50.0 fL Final   • Platelet Count 07/24/2018 200  164 - 446 K/uL Final   • MPV 07/24/2018 10.5  9.0 - 12.9 fL Final   • Nucleated RBC 07/24/2018 0.00  /100 WBC Final   • NRBC (Absolute) 07/24/2018 0.00  K/uL Final   • Neutrophils-Polys 07/24/2018 60.70  44.00 - 72.00 % Final   • Lymphocytes 07/24/2018 21.30* 22.00 - 41.00 % Final   • Monocytes 07/24/2018 13.60* 0.00 - 13.40 % Final   • Eosinophils 07/24/2018 2.90  0.00 - 6.90 % Final   • Basophils 07/24/2018 1.10  0.00 - 1.80 % Final   • Immature Granulocytes 07/24/2018 0.40  0.00 - 0.90 % Final   • Lymphs (Absolute) 07/24/2018 0.97* 1.00 - 4.80 K/uL Final   • Monos (Absolute) 07/24/2018 0.62  0.00 - 0.85 K/uL Final   • Eos (Absolute) 07/24/2018 0.13  0.00 - 0.51 K/uL Final   • Baso (Absolute) 07/24/2018 0.05  0.00 - 0.12 K/uL Final   • Immature Granulocytes (abs) 07/24/2018 0.02  0.00 - 0.11 K/uL Final   • Neutrophils (Absolute) 07/24/2018 2.77  2.00 - 7.15 K/uL Final    Includes immature neutrophils, if present.   • Anisocytosis 07/24/2018 2+   Final   • Macrocytosis 07/24/2018 1+   Final   • Microcytosis 07/24/2018 1+    Final   • Sodium 07/24/2018 139  135 - 145 mmol/L Final   • Potassium 07/24/2018 3.7  3.6 - 5.5 mmol/L Final   • Chloride 07/24/2018 104  96 - 112 mmol/L Final   • Co2 07/24/2018 24  20 - 33 mmol/L Final   • Anion Gap 07/24/2018 11.0  0.0 - 11.9 Final   • Glucose 07/24/2018 122* 65 - 99 mg/dL Final   • Bun 07/24/2018 20  8 - 22 mg/dL Final   • Creatinine 07/24/2018 1.08  0.50 - 1.40 mg/dL Final   • Calcium 07/24/2018 9.5  8.5 - 10.5 mg/dL Final   • AST(SGOT) 07/24/2018 34  12 - 45 U/L Final   • ALT(SGPT) 07/24/2018 28  2 - 50 U/L Final   • Alkaline Phosphatase 07/24/2018 182* 30 - 99 U/L Final   • Total Bilirubin 07/24/2018 1.0  0.1 - 1.5 mg/dL Final   • Albumin 07/24/2018 4.1  3.2 - 4.9 g/dL Final   • Total Protein 07/24/2018 6.8  6.0 - 8.2 g/dL Final   • Globulin 07/24/2018 2.7  1.9 - 3.5 g/dL Final   • A-G Ratio 07/24/2018 1.5  g/dL Final   • Carcinoembryonic Antigen 07/24/2018 6.5* 0.0 - 3.0 ng/mL Final    Comment: Effective September 17, 2013 the quantitative determination  of Carcinoembryonic Antigen (CEA) will now be performed at  [facility].  The Access CEA paramagnetic  particle chemiluminescent immunoassay is used.  Results  obtained with different test methods or kits cannot be used interchangeably.  Measurement of CEA has been shown to be  clinically relevant in the management of patients with  colorectal, breast, lung, prostatic, pancreatic, and ovarian  carcinomas.  Smokers may have slightly elevated levels of CEA.     • GFR If  07/24/2018 59* >60 mL/min/1.73 m 2 Final   • GFR If Non  07/24/2018 49* >60 mL/min/1.73 m 2 Final   • Peripheral Smear Review 07/24/2018 see below   Final    Comment: Due to instrument suspect flags, further review of peripheral smear is  indicated on this patient sample. This review may or may not result in  abnormal findings.     • Plt Estimation 07/24/2018 Normal   Final   • RBC Morphology 07/24/2018 Present   Final   • Large  Platelets 07/24/2018 1+   Final   • Polychromia 07/24/2018 1+   Final   • Comments-Diff 07/24/2018 see below   Final    Results have been verified by peripheral blood smear review.       Ct-chest (thorax) With    Result Date: 6/29/2018 6/29/2018 9:06 AM HISTORY/REASON FOR EXAM:  Rectal cancer. TECHNIQUE/EXAM DESCRIPTION:   CT scan of the chest with contrast. Thin-section helical images were obtained from the lung apices through the adrenal glands following the bolus administration of 100 mL of Omnipaque 350 nonionic contrast. Low dose optimization technique was utilized for this CT exam including automated exposure control and adjustment of the mA and/or kV according to patient size. COMPARISON:  October 6, 2017 and August 24, 2017 FINDINGS: No mediastinal, hilar, or axillary lymphadenopathy. No pleural or percardial effusions. No pulmonary consolidation, nodules, or masses. There is again some scarring in the left lower lobe. There is some atelectasis in the posterior aspect of the right upper lobe. No pneumothorax. Bones unremarkable.     No evidence of metastatic disease in the chest.       Ct-abdomen Liver For Hepatic Mass (cirrhosis)    Result Date: 6/29/2018 6/29/2018 9:06 AM HISTORY/REASON FOR EXAM:  Metastatic rectal cancer. Hepatic metastasis treated with hepatic radioembolization TECHNIQUE/EXAM DESCRIPTION:  Triphasic CT scan of the abdomen without and with contrast. Initial precontrast images were obtained from the diaphragmatic domes through the iliac crests using helical technique.  Following nonionic contrast administration in an intravenous bolus fashion, and postcontrast, thin-section helical scanning obtained from the diaphragmatic domes through the iliac crests.  Imaging was obtained in portal venous and arterial phases. 100 mL of Omnipaque 350 nonionic contrast was administered. Low dose optimization technique was utilized for this CT exam including automated exposure control and adjustment of  the mA and/or kV according to patient size. COMPARISON: March 9, 2018 and September 8, 2017 FINDINGS: Liver morphology: The liver is normal in appearance. Focal liver findings: Lesion 1: The oval-shaped area of decreased attenuation in the hepatic dome, segment 8, is not significantly changed again measuring 3.9 x 2.2 cm. There is again peripheral calcification or radiopaque material from embolization. No other hepatic lesions identified. No new hepatic lesions identified. The right hepatic lobe again enhances heterogeneously. Hepatic arterial vasculature: Hepatic artery is patent and appears normal. Portal vein: The portal vein enhances normally and is patent. Portal vein diameter: 9 mm Splenic vein: The splenic vein is patent. Biliary system: Gallbladder is contracted containing possible calcified stones. No biliary duct dilatation. Relative extrahepatic findings: There is calcified plaque in the aorta without aneurysm Other organs: Splenic length: 11.2 cm Pancreas: Pancreas is unremarkable. Kidneys: Kidneys are unremarkable. Adrenals: Adrenal glands are unremarkable. Lymph nodes: There are no enlarged nodes. Bowel: The bowel is nondilated. A right mid abdomen colostomy is identified. Pelvis: Unremarkable or not scanned. Lung bases: Lung bases are unremarkable . Bones: There is degenerative change in the lumbar spine.     1.  No significant interval change in oval low-attenuation area in the hepatic dome, segment 8 measuring 3.9 x 2.2 cm consistent with post radioembolization change. 2.  Persistent relatively diffuse heterogeneous enhancement of the right hepatic lobe likely related to previous radioembolization. Diffuse tumor involvement would seem less likely.        Assessment/Plan:     1. Rectal cancer (HCC)  [id number] BASE TO MID-THIGH   2. Secondary malignant neoplasm of liver (HCC)  [id number] BASE TO MID-THIGH   3. Discoloration of skin  REFERRAL TO DERMATOLOGY   4. History of pulmonary embolism      5. Ileostomy in place (HCC)     6. Acute pain of left knee     7. Elevated CEA     8. Neoplasm related pain     9. Encounter for antineoplastic chemotherapy         1.  Skin changes: Vague skin discoloration and dimpling noted at left cheek.  She desires new referral to dermatology, through [facility] - order placed.    2.  PE/long-term anticoagulant use: Stable, no stomal bleeding noted with decrease to Xarelto 10 mg daily.    3.  Metastatic liver lesion: She is status post Y90 radio embolization of liver lesion on 6/15/2017, without sequelae. CT completed 6/29/2018 shows stable findings and she is overall asymptomatic, CEA continues to increase. She is due for PET scan in September, sooner as indicated.    4.  Pain: RUQ pain increases following de-access of pump then subsides. No refill needed at today's visit.    Left knee pain x 2 weeks, she will ice/heat/elevate/continue activity as tolerated and follow up with Ortho UC or PCP if pain does not improve or worsens.    5.  Rectal cancer: She continues tolerating treatment well.  CBC and CMP have been evaluated and found to be within acceptable limits.  CEA is noted to be increased.  She will proceed with cycle 56 of single agent 5-FU today and again in 2 weeks, returning to office for evaluation prior to cycle 58 in approximately 1 month, sooner as needed.          The patient verbalized agreement and understanding of current plan. All questions and concerns were addressed at time of visit.    Please note that this dictation was created using voice recognition software. I have made every reasonable attempt to correct obvious errors, but I expect that there are errors of grammar and possibly content that I did not discover before finalizing the note.

## 2018-07-24 NOTE — PROGRESS NOTES
Patient arrived to clinic for 5FU CADD pump connected.  Port accessed this am and labs drawn, results within parameters to treat, port flushed with good blood return noted.  Pre medication given and pump connected with 2nd RN verification.  Confirmed next appointment and pt ambulated out of clinic in no apparent distress.

## 2018-07-24 NOTE — PROGRESS NOTES
Chemotherapy Verification - SECONDARY RN       Height = 166 cm  Weight = 76.2 kg  BSA = 1.87 m2       Medication: Adrucil  Dose: 1,920 mg/m2  Calculated Dose: 3,590.4 mg                             (In mg/m2, AUC, mg/kg)       I confirm that this process was performed independently.

## 2018-07-24 NOTE — PROGRESS NOTES
"Pharmacy Chemotherapy Verification Note:    Patient Name: Karlene Walters      Dx: Colon CA        Protocol: 5-FU + Leucovorin      *Dosing Reference*   IV over 2 hours on Day 1, followed by    IVP on Day 1, followed by         -- 10/31/17: delete Leucovorin and 5-FU push d/t neutropenia per Dr. Hardy   Fluorouracil   IV continuous infusion daily on Days 1 and 2 ( IV over 46-48 hours)        -- 20% reduction (1920 mg/m2) to be continued starting C28 per C Alsop APRN   14 day cycles for 12 cycles (adjuvant) or until disease progression or unacceptable toxicity   NCCN Guidelines for Colon Cancer. V.2.2015.  Jay T, et al. Eur J Cancer.1999;35(9):1343-7.      Allergies:  Codeine; Oxaliplatin; Pcn; Sulfa drugs; and Tape     /68   Pulse (!) 105   Temp 36.4 °C (97.6 °F)   Resp 18   Ht 1.66 m (5' 5.35\")   Wt 76.2 kg (167 lb 15.9 oz)   SpO2 99%   BMI 27.65 kg/m²  Body surface area is 1.87 meters squared.     Labs 7/24/18:  ANC~ 2770   Plt = 200 k     Hgb = 12.2  SCr = 1.08 mg/dL  CrCl ~ 52 mL/min   AST/ALT/AP = 34/28/182 (no adjustments recommended) TBili = 1          Drug Order   (Drug name, dose, route, IV Fluid & volume, frequency, number of doses) Cycle 64 (C56 in [place])  Previous treatment: C63 = 7/12/18     Medication = Fluorouracil (5-FU)   Base Dose = 1920 mg/m²  Calc Dose:Base Dose x 1.87 m² = 3590 mg  Final Dose = 3590 mg   Route = CIVI  Drug Conc = 50 mg/mL  Fluid & Volume = 71.8 mL (+ 3 mL overfill = 74.8 mL)  Admin Duration = CIVI over 46 hrs @ 1.6 mL/hr   Via CADD pump for home infusion        <5% difference, ok to treat with final dose     By my signature below, I confirm this process was performed independently with the BSA and all final chemotherapy dosing calculations congruent. I have reviewed the above chemotherapy order and that my calculation of the final dose and BSA (when applicable) corroborate those calculations of the  pharmacist.       Yi Knott, PharmD  "

## 2018-07-26 ENCOUNTER — OUTPATIENT INFUSION SERVICES (OUTPATIENT)
Dept: ONCOLOGY | Facility: MEDICAL CENTER | Age: 78
End: 2018-07-26
Attending: INTERNAL MEDICINE
Payer: MEDICARE

## 2018-07-26 VITALS
OXYGEN SATURATION: 98 % | HEART RATE: 91 BPM | SYSTOLIC BLOOD PRESSURE: 112 MMHG | BODY MASS INDEX: 28.03 KG/M2 | TEMPERATURE: 98.2 F | HEIGHT: 65 IN | WEIGHT: 168.21 LBS | DIASTOLIC BLOOD PRESSURE: 73 MMHG | RESPIRATION RATE: 18 BRPM

## 2018-07-26 DIAGNOSIS — R94.4 DECREASED GFR: ICD-10-CM

## 2018-07-26 DIAGNOSIS — C20 RECTAL CANCER (HCC): ICD-10-CM

## 2018-07-26 PROCEDURE — 96523 IRRIG DRUG DELIVERY DEVICE: CPT

## 2018-07-26 PROCEDURE — 700111 HCHG RX REV CODE 636 W/ 250 OVERRIDE (IP)

## 2018-07-26 RX ADMIN — HEPARIN 500 UNITS: 100 SYRINGE at 13:22

## 2018-07-26 ASSESSMENT — PAIN SCALES - GENERAL: PAINLEVEL_OUTOF10: 0

## 2018-07-26 NOTE — PROGRESS NOTES
Pt ambulated into department for 5FU disconnect. Reported tolerating home infusion well. Stated that pump starting beeping at 08:30 am. Pump indicated that 72.5 ml's were given, 0 ml's remained. Pt disconnected from chemotherapy, pump stored in \[facility]\"" pharmacy. Port had brisk blood return, flushed per [facility] policy, de-accessed, needle intact insertion site covered with sterile gauze and paper tape. Pt's next appointment in 2 weeks on 8/7/18 at 10:30 am confirmed with Pt prior to leaving. Left department by self appearing in good spirits and NAD.

## 2018-07-30 ENCOUNTER — TELEPHONE (OUTPATIENT)
Dept: HEMATOLOGY ONCOLOGY | Facility: MEDICAL CENTER | Age: 78
End: 2018-07-30

## 2018-07-30 NOTE — TELEPHONE ENCOUNTER
Patient called regarding a pet/ct scan on 9/17/2018, she is requesting a phone call back regarding a mammogram and colonoscopy. She would like to know if she should be also getting these scans done as well.

## 2018-07-30 NOTE — TELEPHONE ENCOUNTER
Called pt and left voicemail informing her that RN will follow up with Dr. Hardy tomorrow when he returns to the office regarding orders for mammogram and colonoscopy.

## 2018-08-01 ENCOUNTER — TELEPHONE (OUTPATIENT)
Dept: HEMATOLOGY ONCOLOGY | Facility: MEDICAL CENTER | Age: 78
End: 2018-08-01

## 2018-08-01 NOTE — TELEPHONE ENCOUNTER
Called patient to clarify if  was going to repeat her mammogram and coloscopy. Per  there is no need to repeat a mammogram and coloscopy at this time. He would like to wait for her PETCT and after decide. Patient verbally understood and had no further questions or concerns.

## 2018-08-04 NOTE — PROGRESS NOTES
"Pharmacy Chemotherapy Verification Note:    Patient Name: Karlene Walters      Dx: Colon CA        Protocol: 5-FU + Leucovorin      *Dosing Reference*   IV over 2 hours on Day 1, followed by    IVP on Day 1, followed by         -- 10/31/17: delete Leucovorin and 5-FU push d/t neutropenia per Dr. Hardy   Fluorouracil   IV continuous infusion daily on Days 1 and 2 ( IV over 46-48 hours)        -- 20% reduction (1920 mg/m2) to be continued starting C28 per C Alsop APRN   14 day cycles for 12 cycles (adjuvant) or until disease progression or unacceptable toxicity  NCCN Guidelines for Colon Cancer. V.2.2015.  Jay T, et al. Eur J Cancer.1999;35(9):1343-7.      Allergies:  Codeine; Oxaliplatin; Pcn; Sulfa drugs; and Tape     /65   Pulse 92   Temp 37.1 °C (98.7 °F)   Resp 18   Ht 1.66 m (5' 5.35\")   Wt 76.7 kg (169 lb 1.5 oz)   SpO2 98%   BMI 27.83 kg/m²  Body surface area is 1.88 meters squared.     Labs:  8/7/18:  ANC~ 2480   Plt = 193 k     Hgb = 11.8    7/24/18:  SCr = 1.08 mg/dL  CrCl ~ 52 mL/min   AST/ALT/AP = 34/28/182 (no adjustments required)  TBili = 1           Drug Order   (Drug name, dose, route, IV Fluid & volume, frequency, number of doses) Cycle 65 (C57 in [place])  Previous treatment: C64 = 7/24/18     Medication = Fluorouracil (5-FU)   Base Dose = 1920 mg/m²  Calc Dose:Base Dose x 1.88 m² = 3610 mg  Final Dose = 3610 mg   Route = CIVI  Drug Conc = 50 mg/mL  Fluid & Volume = 72.2 mL (+ 3 mL overfill = 75.2 mL)  Admin Duration = CIVI over 46 hrs @ 1.6 mL/hr   Via CADD pump for home infusion        <5% difference, ok to treat with final dose     By my signature below, I confirm this process was performed independently with the BSA and all final chemotherapy dosing calculations congruent. I have reviewed the above chemotherapy order and that my calculation of the final dose and BSA (when applicable) corroborate those calculations of the  pharmacist.       Yi Knott, PharmD  "

## 2018-08-06 NOTE — PROGRESS NOTES
"Pharmacy Chemotherapy Verification Note:    Patient Name: Karlene Walters      Dx: Colon CA        Cycle 65   Previous treatment: C64 = 7/24/18    Protocol: 5-FU+Leucovorin       IV over 2 hours on Day 1, followed by   IVP on Day 1, followed by    - Dose reduced by 20% to 320 mg/m² starting with Cycle 18 ([place] #11) on 7/5/16 due to neutropenia    10/31/17: delete Leucovorin and 5-FU push d/t neutropenia per Dr. Hardy   Fluorouracil 1200 mg/m²  IV continuous infusion daily on Days 1 and 2 (2400 mg/m² IV over 46-48 hours) -    - Infusion Dose reduced by 20% starting with Cycle 18 ([place] #11) on 7/5/16 due to neutropenia   -20% reduction (1920 mg/m2) to be continued starting C28 per C Alsop APRN   14 day cycles for 12 cycles (adjuvant) or until disease progression or unacceptable toxicity  NCCN Guidelines for Colon Cancer. V.2.2015.  Jay T, et al. Eur J Cancer.1999;35(9):1343-7.      Allergies:  Codeine; Oxaliplatin; Pcn; Sulfa drugs; and Tape     /65   Pulse 92   Temp 37.1 °C (98.7 °F)   Resp 18   Ht 1.66 m (5' 5.35\")   Wt 76.7 kg (169 lb 1.5 oz)   SpO2 98%   BMI 27.83 kg/m²    Body surface area is 1.88 meters squared.     LABS 8/7/2018:  ANC: 2480      WBC: 4.3     Plt: 193k   Hgb/Hct: 11.8/37.5       LABS 7/24/2018:  SCr = 1.08 mg/dL                    CrCl ~ 52 mL/min   AST/ALT/AP = 34/28/182 (no adjustments recommended)  TBili = 1     CMP on even cycles per TP       Fluorouracil (5-FU) 1920 mg/m² x  Body surface area is 1.88 meters squared.m² = 3609.6 mg              <5% difference, OK to treat with final dose = 3610 mg    CIVI over 46 hours Via home infusion pump at 1.6ml/hr                   ANA Villatoro, PharmD    "

## 2018-08-07 ENCOUNTER — OUTPATIENT INFUSION SERVICES (OUTPATIENT)
Dept: ONCOLOGY | Facility: MEDICAL CENTER | Age: 78
End: 2018-08-07
Attending: INTERNAL MEDICINE
Payer: MEDICARE

## 2018-08-07 VITALS
BODY MASS INDEX: 28.17 KG/M2 | SYSTOLIC BLOOD PRESSURE: 127 MMHG | WEIGHT: 169.09 LBS | HEART RATE: 92 BPM | OXYGEN SATURATION: 98 % | RESPIRATION RATE: 18 BRPM | DIASTOLIC BLOOD PRESSURE: 65 MMHG | HEIGHT: 65 IN | TEMPERATURE: 98.7 F

## 2018-08-07 DIAGNOSIS — C20 RECTAL CANCER (HCC): ICD-10-CM

## 2018-08-07 DIAGNOSIS — C78.7 SECONDARY MALIGNANT NEOPLASM OF LIVER (HCC): ICD-10-CM

## 2018-08-07 DIAGNOSIS — R94.4 DECREASED GFR: ICD-10-CM

## 2018-08-07 LAB
ANISOCYTOSIS BLD QL SMEAR: ABNORMAL
BASOPHILS # BLD AUTO: 0.9 % (ref 0–1.8)
BASOPHILS # BLD: 0.04 K/UL (ref 0–0.12)
COMMENT 1642: NORMAL
EOSINOPHIL # BLD AUTO: 0.16 K/UL (ref 0–0.51)
EOSINOPHIL NFR BLD: 3.7 % (ref 0–6.9)
ERYTHROCYTE [DISTWIDTH] IN BLOOD BY AUTOMATED COUNT: 66.8 FL (ref 35.9–50)
HCT VFR BLD AUTO: 37.5 % (ref 37–47)
HGB BLD-MCNC: 11.8 G/DL (ref 12–16)
IMM GRANULOCYTES # BLD AUTO: 0.01 K/UL (ref 0–0.11)
IMM GRANULOCYTES NFR BLD AUTO: 0.2 % (ref 0–0.9)
LYMPHOCYTES # BLD AUTO: 0.92 K/UL (ref 1–4.8)
LYMPHOCYTES NFR BLD: 21.3 % (ref 22–41)
MACROCYTES BLD QL SMEAR: ABNORMAL
MCH RBC QN AUTO: 27.4 PG (ref 27–33)
MCHC RBC AUTO-ENTMCNC: 31.5 G/DL (ref 33.6–35)
MCV RBC AUTO: 87 FL (ref 81.4–97.8)
MICROCYTES BLD QL SMEAR: ABNORMAL
MONOCYTES # BLD AUTO: 0.7 K/UL (ref 0–0.85)
MONOCYTES NFR BLD AUTO: 16.2 % (ref 0–13.4)
MORPHOLOGY BLD-IMP: NORMAL
NEUTROPHILS # BLD AUTO: 2.48 K/UL (ref 2–7.15)
NEUTROPHILS NFR BLD: 57.7 % (ref 44–72)
NRBC # BLD AUTO: 0 K/UL
NRBC BLD-RTO: 0 /100 WBC
PLATELET # BLD AUTO: 193 K/UL (ref 164–446)
PLATELET BLD QL SMEAR: NORMAL
PMV BLD AUTO: 10.1 FL (ref 9–12.9)
RBC # BLD AUTO: 4.31 M/UL (ref 4.2–5.4)
RBC BLD AUTO: PRESENT
WBC # BLD AUTO: 4.3 K/UL (ref 4.8–10.8)

## 2018-08-07 PROCEDURE — A4212 NON CORING NEEDLE OR STYLET: HCPCS

## 2018-08-07 PROCEDURE — 96374 THER/PROPH/DIAG INJ IV PUSH: CPT | Mod: XU

## 2018-08-07 PROCEDURE — 700111 HCHG RX REV CODE 636 W/ 250 OVERRIDE (IP): Performed by: INTERNAL MEDICINE

## 2018-08-07 PROCEDURE — 85025 COMPLETE CBC W/AUTO DIFF WBC: CPT

## 2018-08-07 PROCEDURE — G0498 CHEMO EXTEND IV INFUS W/PUMP: HCPCS

## 2018-08-07 RX ORDER — DEXAMETHASONE SODIUM PHOSPHATE 4 MG/ML
8 INJECTION, SOLUTION INTRA-ARTICULAR; INTRALESIONAL; INTRAMUSCULAR; INTRAVENOUS; SOFT TISSUE ONCE
Status: COMPLETED | OUTPATIENT
Start: 2018-08-07 | End: 2018-08-07

## 2018-08-07 RX ORDER — DEXTROSE MONOHYDRATE 50 MG/ML
INJECTION, SOLUTION INTRAVENOUS CONTINUOUS
Status: CANCELLED | OUTPATIENT
Start: 2018-08-07

## 2018-08-07 RX ORDER — PROCHLORPERAZINE MALEATE 10 MG
10 TABLET ORAL EVERY 6 HOURS PRN
Status: CANCELLED | OUTPATIENT
Start: 2018-08-07

## 2018-08-07 RX ORDER — DEXAMETHASONE SODIUM PHOSPHATE 4 MG/ML
8 INJECTION, SOLUTION INTRA-ARTICULAR; INTRALESIONAL; INTRAMUSCULAR; INTRAVENOUS; SOFT TISSUE ONCE
Status: CANCELLED | OUTPATIENT
Start: 2018-08-07 | End: 2018-08-07

## 2018-08-07 RX ORDER — ONDANSETRON 2 MG/ML
4 INJECTION INTRAMUSCULAR; INTRAVENOUS
Status: CANCELLED | OUTPATIENT
Start: 2018-08-07

## 2018-08-07 RX ORDER — ONDANSETRON 8 MG/1
8 TABLET, ORALLY DISINTEGRATING ORAL
Status: CANCELLED | OUTPATIENT
Start: 2018-08-07

## 2018-08-07 RX ORDER — LIDOCAINE HYDROCHLORIDE 10 MG/ML
10 INJECTION, SOLUTION INFILTRATION; PERINEURAL ONCE
Status: CANCELLED | OUTPATIENT
Start: 2018-08-07 | End: 2018-08-07

## 2018-08-07 RX ADMIN — DEXAMETHASONE SODIUM PHOSPHATE 8 MG: 4 INJECTION, SOLUTION INTRAMUSCULAR; INTRAVENOUS at 12:05

## 2018-08-07 RX ADMIN — FLUOROURACIL 3610 MG: 50 INJECTION, SOLUTION INTRAVENOUS at 13:23

## 2018-08-07 ASSESSMENT — PAIN SCALES - GENERAL
PAINLEVEL_OUTOF10: 0
PAINLEVEL: NO PAIN

## 2018-08-07 NOTE — TELEPHONE ENCOUNTER
Called patient and confirmed with her that Dr. West would like the patient to proceed with CT of the abdomen and the PET CT for further evaluation.  Pt verbalized understanding and agreed with POC.

## 2018-08-07 NOTE — PROGRESS NOTES
Paitient here for CADD home pump connect. Port accessed using sterile technique; brisk blood return noted. Labs drawn. Patient connected to 5FU CADD pump running at 1.6 ml/hr. Total volume with overfill = 73.5 ml. Next appointment scheduled. Discharged to self care; no apparent distress noted.

## 2018-08-07 NOTE — PROGRESS NOTES
Chemotherapy Verification - SECONDARY RN       Height = 65.35 in  Weight = 76.7 kg  BSA = 1.88 m2       Medication: 5 FU pump  Dose: 1920 mg/m2 over 46 hours  Calculated Dose: 3609.6 mg over 46 hours                            (In mg/m2, AUC, mg/kg)     I confirm that this process was performed independently.

## 2018-08-07 NOTE — PROGRESS NOTES
Chemotherapy Verification - PRIMARY RN      Height = 166 cm  Weight = 76.7 kg  BSA = 1.88 m^2       Medication: Fluorouracil  Dose: 1920 mg/m^2  Calculated Dose: 3609.6 mg                             (In mg/m2, AUC, mg/kg)     I confirm this process was performed independently with the BSA and all final chemotherapy dosing calculations congruent.  Any discrepancies of 5% or greater have been addressed with the chemotherapy pharmacist. The resolution of the discrepancy has been documented in the EPIC progress notes.

## 2018-08-07 NOTE — TELEPHONE ENCOUNTER
Patient walked in and would like to request a phone call regarding a CT scan which Dr. Hardy was or is going to talk to Dr. West about.

## 2018-08-09 ENCOUNTER — OUTPATIENT INFUSION SERVICES (OUTPATIENT)
Dept: ONCOLOGY | Facility: MEDICAL CENTER | Age: 78
End: 2018-08-09
Attending: INTERNAL MEDICINE
Payer: MEDICARE

## 2018-08-09 VITALS
TEMPERATURE: 98 F | WEIGHT: 171.96 LBS | BODY MASS INDEX: 28.65 KG/M2 | HEIGHT: 65 IN | OXYGEN SATURATION: 100 % | HEART RATE: 91 BPM | RESPIRATION RATE: 18 BRPM | DIASTOLIC BLOOD PRESSURE: 55 MMHG | SYSTOLIC BLOOD PRESSURE: 106 MMHG

## 2018-08-09 DIAGNOSIS — C20 RECTAL CANCER (HCC): ICD-10-CM

## 2018-08-09 DIAGNOSIS — R94.4 DECREASED GFR: ICD-10-CM

## 2018-08-09 PROCEDURE — 700111 HCHG RX REV CODE 636 W/ 250 OVERRIDE (IP)

## 2018-08-09 PROCEDURE — 96523 IRRIG DRUG DELIVERY DEVICE: CPT

## 2018-08-09 RX ADMIN — HEPARIN 500 UNITS: 100 SYRINGE at 13:50

## 2018-08-09 ASSESSMENT — PAIN SCALES - GENERAL: PAINLEVEL_OUTOF10: 0

## 2018-08-09 NOTE — PROGRESS NOTES
Pt arrived to [facility], ambulatory, for pump de-access. Pt voices no complaints. Pump turned off upon arrival, 0 mL left to be delivered. Pump disconnected and chemotherapy cassette disposed of. Port flushed and heparin locked per policy, port de-accessed. Pt left IS with no s/sx of distress. Follow up appointment confirmed.

## 2018-08-20 NOTE — PROGRESS NOTES
"Pharmacy Chemotherapy Verification Note:    Patient Name: Karlene Walters      Dx: Colon CA        Protocol: 5-FU + Leucovorin      *Dosing Reference*   IV over 2 hours on Day 1, followed by    IVP on Day 1, followed by         -- 10/31/17: delete Leucovorin and 5-FU push d/t neutropenia per Dr. Hardy   Fluorouracil   IV continuous infusion daily on Days 1 and 2 ( IV over 46-48 hours)        -- 20% reduction (1920 mg/m2) to be continued starting C28 per C Alsop APRN   14 day cycles for 12 cycles (adjuvant) or until disease progression or unacceptable toxicity  NCCN Guidelines for Colon Cancer. V.2.2015.  Jay T, et al. Eur J Cancer.1999;35(9):1343-7.      Allergies:  Codeine; Oxaliplatin; Pcn; Sulfa drugs; and Tape     /73   Pulse (!) 115   Temp 36.3 °C (97.3 °F)   Resp 18   Ht 1.66 m (5' 5.35\")   Wt 75.9 kg (167 lb 5.3 oz)   SpO2 99%   BMI 27.54 kg/m²  Body surface area is 1.87 meters squared.     Labs:  8/21/18:  ANC~ 1890  Plt = 206 k  Hgb = 12.8    8/21/18:  SCr = 1.22 mg/dL CrCl ~ 46 mL/min (OK if > 30 ml/min)  AST/ALT/AP = 38/32/180 (no adjustments required)  TBili = 0.8    Drug Order   (Drug name, dose, route, IV Fluid & volume, frequency, number of doses) Cycle 66 (C58 in [place])  Previous treatment: C65 = 8/7/18     Medication = Fluorouracil (5-FU)   Base Dose = 1920 mg/m²  Calc Dose:Base Dose x 1.87 m² = 3590 mg  Final Dose = 3590 mg   Route = CIVI  Drug Conc = 50 mg/mL  Fluid & Volume = 71.8 mL (+ 3 mL overfill = 74.8 mL)  Admin Duration = CIVI over 46 hrs @ 1.6 mL/hr   Via CADD pump for home infusion        <5% difference, ok to treat with final dose     By my signature below, I confirm this process was performed independently with the BSA and all final chemotherapy dosing calculations congruent. I have reviewed the above chemotherapy order and that my calculation of the final dose and BSA (when applicable) corroborate those calculations of the  pharmacist.     Norris Mitchell, " PharmD

## 2018-08-21 ENCOUNTER — OUTPATIENT INFUSION SERVICES (OUTPATIENT)
Dept: ONCOLOGY | Facility: MEDICAL CENTER | Age: 78
End: 2018-08-21
Attending: INTERNAL MEDICINE
Payer: MEDICARE

## 2018-08-21 ENCOUNTER — OFFICE VISIT (OUTPATIENT)
Dept: HEMATOLOGY ONCOLOGY | Facility: MEDICAL CENTER | Age: 78
End: 2018-08-21
Payer: MEDICARE

## 2018-08-21 VITALS
SYSTOLIC BLOOD PRESSURE: 121 MMHG | HEIGHT: 65 IN | OXYGEN SATURATION: 99 % | WEIGHT: 167.33 LBS | RESPIRATION RATE: 18 BRPM | HEART RATE: 115 BPM | TEMPERATURE: 97.3 F | BODY MASS INDEX: 27.88 KG/M2 | DIASTOLIC BLOOD PRESSURE: 73 MMHG

## 2018-08-21 VITALS
HEIGHT: 65 IN | DIASTOLIC BLOOD PRESSURE: 64 MMHG | SYSTOLIC BLOOD PRESSURE: 112 MMHG | RESPIRATION RATE: 18 BRPM | TEMPERATURE: 97.9 F | WEIGHT: 167.44 LBS | OXYGEN SATURATION: 100 % | HEART RATE: 104 BPM | BODY MASS INDEX: 27.9 KG/M2

## 2018-08-21 VITALS
OXYGEN SATURATION: 99 % | HEART RATE: 115 BPM | BODY MASS INDEX: 27.88 KG/M2 | HEIGHT: 65 IN | TEMPERATURE: 97.3 F | RESPIRATION RATE: 18 BRPM | SYSTOLIC BLOOD PRESSURE: 121 MMHG | DIASTOLIC BLOOD PRESSURE: 73 MMHG | WEIGHT: 167.33 LBS

## 2018-08-21 DIAGNOSIS — C20 RECTAL CANCER (HCC): ICD-10-CM

## 2018-08-21 DIAGNOSIS — C78.7 SECONDARY MALIGNANT NEOPLASM OF LIVER (HCC): ICD-10-CM

## 2018-08-21 DIAGNOSIS — E87.6 HYPOKALEMIA: ICD-10-CM

## 2018-08-21 LAB
ALBUMIN SERPL BCP-MCNC: 3.8 G/DL (ref 3.2–4.9)
ALBUMIN/GLOB SERPL: 1.4 G/DL
ALP SERPL-CCNC: 180 U/L (ref 30–99)
ALT SERPL-CCNC: 32 U/L (ref 2–50)
ANION GAP SERPL CALC-SCNC: 11 MMOL/L (ref 0–11.9)
ANISOCYTOSIS BLD QL SMEAR: ABNORMAL
AST SERPL-CCNC: 38 U/L (ref 12–45)
BASOPHILS # BLD AUTO: 1.3 % (ref 0–1.8)
BASOPHILS # BLD: 0.05 K/UL (ref 0–0.12)
BILIRUB SERPL-MCNC: 0.8 MG/DL (ref 0.1–1.5)
BUN SERPL-MCNC: 21 MG/DL (ref 8–22)
CALCIUM SERPL-MCNC: 9.6 MG/DL (ref 8.5–10.5)
CEA SERPL-MCNC: 7.7 NG/ML (ref 0–3)
CHLORIDE SERPL-SCNC: 106 MMOL/L (ref 96–112)
CO2 SERPL-SCNC: 22 MMOL/L (ref 20–33)
COMMENT 1642: NORMAL
CREAT SERPL-MCNC: 1.22 MG/DL (ref 0.5–1.4)
EOSINOPHIL # BLD AUTO: 0.19 K/UL (ref 0–0.51)
EOSINOPHIL NFR BLD: 4.8 % (ref 0–6.9)
ERYTHROCYTE [DISTWIDTH] IN BLOOD BY AUTOMATED COUNT: 66.6 FL (ref 35.9–50)
GLOBULIN SER CALC-MCNC: 2.8 G/DL (ref 1.9–3.5)
GLUCOSE SERPL-MCNC: 131 MG/DL (ref 65–99)
HCT VFR BLD AUTO: 40.2 % (ref 37–47)
HGB BLD-MCNC: 12.8 G/DL (ref 12–16)
IMM GRANULOCYTES # BLD AUTO: 0.02 K/UL (ref 0–0.11)
IMM GRANULOCYTES NFR BLD AUTO: 0.5 % (ref 0–0.9)
LG PLATELETS BLD QL SMEAR: NORMAL
LYMPHOCYTES # BLD AUTO: 1.25 K/UL (ref 1–4.8)
LYMPHOCYTES NFR BLD: 31.8 % (ref 22–41)
MCH RBC QN AUTO: 27.6 PG (ref 27–33)
MCHC RBC AUTO-ENTMCNC: 31.8 G/DL (ref 33.6–35)
MCV RBC AUTO: 86.8 FL (ref 81.4–97.8)
MICROCYTES BLD QL SMEAR: ABNORMAL
MONOCYTES # BLD AUTO: 0.53 K/UL (ref 0–0.85)
MONOCYTES NFR BLD AUTO: 13.5 % (ref 0–13.4)
MORPHOLOGY BLD-IMP: NORMAL
NEUTROPHILS # BLD AUTO: 1.89 K/UL (ref 2–7.15)
NEUTROPHILS NFR BLD: 48.1 % (ref 44–72)
NRBC # BLD AUTO: 0 K/UL
NRBC BLD-RTO: 0 /100 WBC
OVALOCYTES BLD QL SMEAR: NORMAL
PLATELET # BLD AUTO: 206 K/UL (ref 164–446)
PLATELET BLD QL SMEAR: NORMAL
PMV BLD AUTO: 10.2 FL (ref 9–12.9)
POIKILOCYTOSIS BLD QL SMEAR: NORMAL
POLYCHROMASIA BLD QL SMEAR: NORMAL
POTASSIUM SERPL-SCNC: 3.2 MMOL/L (ref 3.6–5.5)
PROT SERPL-MCNC: 6.6 G/DL (ref 6–8.2)
RBC # BLD AUTO: 4.63 M/UL (ref 4.2–5.4)
RBC BLD AUTO: PRESENT
SODIUM SERPL-SCNC: 139 MMOL/L (ref 135–145)
WBC # BLD AUTO: 3.9 K/UL (ref 4.8–10.8)

## 2018-08-21 PROCEDURE — A4212 NON CORING NEEDLE OR STYLET: HCPCS

## 2018-08-21 PROCEDURE — 96374 THER/PROPH/DIAG INJ IV PUSH: CPT | Mod: XU

## 2018-08-21 PROCEDURE — 82378 CARCINOEMBRYONIC ANTIGEN: CPT

## 2018-08-21 PROCEDURE — 85025 COMPLETE CBC W/AUTO DIFF WBC: CPT

## 2018-08-21 PROCEDURE — 700111 HCHG RX REV CODE 636 W/ 250 OVERRIDE (IP)

## 2018-08-21 PROCEDURE — 306780 HCHG STAT FOR TRANSFUSION PER CASE

## 2018-08-21 PROCEDURE — 700111 HCHG RX REV CODE 636 W/ 250 OVERRIDE (IP): Performed by: NURSE PRACTITIONER

## 2018-08-21 PROCEDURE — G0498 CHEMO EXTEND IV INFUS W/PUMP: HCPCS

## 2018-08-21 PROCEDURE — 99214 OFFICE O/P EST MOD 30 MIN: CPT | Performed by: NURSE PRACTITIONER

## 2018-08-21 PROCEDURE — 80053 COMPREHEN METABOLIC PANEL: CPT

## 2018-08-21 PROCEDURE — 96375 TX/PRO/DX INJ NEW DRUG ADDON: CPT

## 2018-08-21 PROCEDURE — 36591 DRAW BLOOD OFF VENOUS DEVICE: CPT

## 2018-08-21 RX ORDER — POTASSIUM CHLORIDE 20 MEQ/1
40 TABLET, EXTENDED RELEASE ORAL ONCE
Status: DISCONTINUED | OUTPATIENT
Start: 2018-08-21 | End: 2018-08-21 | Stop reason: HOSPADM

## 2018-08-21 RX ORDER — ONDANSETRON 8 MG/1
8 TABLET, ORALLY DISINTEGRATING ORAL
Status: CANCELLED | OUTPATIENT
Start: 2018-08-21

## 2018-08-21 RX ORDER — DEXAMETHASONE SODIUM PHOSPHATE 4 MG/ML
8 INJECTION, SOLUTION INTRA-ARTICULAR; INTRALESIONAL; INTRAMUSCULAR; INTRAVENOUS; SOFT TISSUE ONCE
Status: CANCELLED | OUTPATIENT
Start: 2018-08-21 | End: 2018-08-21

## 2018-08-21 RX ORDER — DEXTROSE MONOHYDRATE 50 MG/ML
INJECTION, SOLUTION INTRAVENOUS CONTINUOUS
Status: CANCELLED | OUTPATIENT
Start: 2018-08-21

## 2018-08-21 RX ORDER — LIDOCAINE HYDROCHLORIDE 10 MG/ML
10 INJECTION, SOLUTION INFILTRATION; PERINEURAL ONCE
Status: CANCELLED | OUTPATIENT
Start: 2018-08-21 | End: 2018-08-21

## 2018-08-21 RX ORDER — ONDANSETRON 2 MG/ML
4 INJECTION INTRAMUSCULAR; INTRAVENOUS
Status: CANCELLED | OUTPATIENT
Start: 2018-08-21

## 2018-08-21 RX ORDER — DEXAMETHASONE SODIUM PHOSPHATE 4 MG/ML
8 INJECTION, SOLUTION INTRA-ARTICULAR; INTRALESIONAL; INTRAMUSCULAR; INTRAVENOUS; SOFT TISSUE ONCE
Status: COMPLETED | OUTPATIENT
Start: 2018-08-21 | End: 2018-08-21

## 2018-08-21 RX ORDER — PROCHLORPERAZINE MALEATE 10 MG
10 TABLET ORAL EVERY 6 HOURS PRN
Status: CANCELLED | OUTPATIENT
Start: 2018-08-21

## 2018-08-21 RX ADMIN — DEXAMETHASONE SODIUM PHOSPHATE 8 MG: 4 INJECTION, SOLUTION INTRAMUSCULAR; INTRAVENOUS at 10:52

## 2018-08-21 RX ADMIN — HEPARIN 500 UNITS: 100 SYRINGE at 07:44

## 2018-08-21 RX ADMIN — FLUOROURACIL 3590 MG: 50 INJECTION, SOLUTION INTRAVENOUS at 11:30

## 2018-08-21 ASSESSMENT — ENCOUNTER SYMPTOMS
MYALGIAS: 0
SHORTNESS OF BREATH: 0
NAUSEA: 0
DIARRHEA: 0
FEVER: 0
HEADACHES: 0
WEIGHT LOSS: 1
INSOMNIA: 0
COUGH: 0
PALPITATIONS: 0
DIZZINESS: 0
WHEEZING: 0
CHILLS: 0
CONSTIPATION: 0
VOMITING: 0

## 2018-08-21 ASSESSMENT — PAIN SCALES - GENERAL
PAINLEVEL_OUTOF10: 0
PAINLEVEL: NO PAIN
PAINLEVEL_OUTOF10: 0

## 2018-08-21 NOTE — PROGRESS NOTES
Patient arrived ambulatory to the [facility] for 5FU. Reviewed vital signs, labs, and physician order. Patient reports seeing Ella GONZALEZ, pt to take Potassium 40meq at home rather than replace in [facility] for Potassium level of 3.2, confirmed order with Ella GONZALEZ. Pt arrives with left chest port accessed at previous apt, visualized brisk blood return. Pre-treatment medication administered. 5FU pump settings and blood return from left chest port blood return verified with Rachna RN. Confirmed pump settings, and home chemotherapy spill precautions with pt, pt and spouse verbalized understanding of these instructions. Volume of 72.5ml running at rate of 1.6ml/hr, connected at 1130. Confirmed upcoming appointment date and time with patient. Patient left the [facility] ambulatory in no sign of distress.

## 2018-08-21 NOTE — PROGRESS NOTES
Chemotherapy Verification - PRIMARY RN      Height = 65.35in  Weight = 75.9kg  BSA = 1.86m2       Medication: Fluorouracil  Dose: 1920mg/m2  Calculated Dose: 3584.3mg                             (In mg/m2, AUC, mg/kg)         I confirm this process was performed independently with the BSA and all final chemotherapy dosing calculations congruent.  Any discrepancies of 5% or greater have been addressed with the chemotherapy pharmacist. The resolution of the discrepancy has been documented in the EPIC progress notes.

## 2018-08-21 NOTE — PROGRESS NOTES
Subjective:      Karlene Walters is a 78 y.o. female who presents for Follow-Up (prechemo ([place])) for metastatic colon cancer.         HPI    Patient seen today in follow up for metastatic colon to liver. She present accompanied by her  for today's visit. Patient is scheduled to receive cycle 58 of single agent 5FU today.     Patient is doing well and tolerating treatment well. She does have some fatigue but it is very tolerable. She is attempting to eat better and has lost mild weight in the last month. She denies fevers or chills. She denies severe cracking or peeling of her hands or feet. She does have dry hands and c/o some spots on her hands that do itch at times. She is scheduled to see derm tomorrow. She denies chest pain or heart palpitations. She does get intermittent lower extremity edema, more so with traveling. She takes Lasix with potassium as needed. She states she takes a dose approx once a week. She did take a dose of Lasix after her travel from [place] yesterday and forgot to take Postassium. Her potassium is low today at 3.2. She does see cardiology, and is scheduled to meet with Dr. Kelly in October. She stated she is drinking a lot of water, but then that causes feeling of fluid overload and then will take a Lasix. I did request she discuss with Dr. Kelly when seen in October. She denies any coughing, wheezing or shortness of breath. She denies any nausea, vomiting, diarrhea or constipation. Her output via ostomy of normal per patient's routine. She is voiding without difficulty, and denies any pain, dizziness or severe HA. She has some mild discomfort of the head at times, but will not classify them as HA.     She is feeling anxious as her CEA has been continuing to trend up lately. She is scheduled for a PET CT as well as a CT scan of the abdomen that was ordered by Dr. Mclaughlin, surgeon. She is due for imaging in 2-3 weeks.         Allergies   Allergen Reactions   •  "Codeine      \"gets drunk\"   • Oxaliplatin Anaphylaxis   • Pcn [Penicillins] Itching   • Sulfa Drugs Itching   • Tape Rash     PAPER TAPE OK     Current Outpatient Prescriptions on File Prior to Visit   Medication Sig Dispense Refill   • raNITidine (ZANTAC) 150 MG Tab TAKE 1 TABLET 2 TIMES A DAY 60 Tab 3   • potassium chloride ER (KLOR-CON) 10 MEQ tablet TAKE ONE TABLET EVERY DAY AS NEEDED 30 Tab 6   • Tiotropium Bromide-Olodaterol (STIOLTO RESPIMAT) 2.5-2.5 MCG/ACT Aero Soln Take 2 Puffs by mouth every day. 1 Inhaler 11   • furosemide (LASIX) 40 MG Tab Take 1 Tab by mouth every day. 30 Tab 11   • cyanocobalamin (VITAMIN B-12) 500 MCG Tab Take 500 mcg by mouth every day.     • Multiple Vitamins-Minerals (CENTRUM SILVER PO) Take  by mouth.     • Cholecalciferol (VITAMIN D3) 400 UNITS CAPS Take 1 Cap by mouth every day.     • loratadine (CLARITIN) 10 MG TABS Take 10 mg by mouth every day. Indications: Hayfever     • nitroglycerin (NITROSTAT) 0.4 MG SL Tab Place 1 Tab under tongue as needed for Chest Pain. 25 Tab 0   • loperamide (IMODIUM) 2 MG Cap Take 2 mg by mouth 4 times a day as needed for Diarrhea.     • rivaroxaban (XARELTO) 10 MG Tab tablet Take 1 Tab by mouth every day. 30 Tab 6   • lidocaine-prilocaine (EMLA) 2.5-2.5 % Cream APPLY A THICK FILM OVER THE PORT ACCESS SITE PRIOR TO ACCESS 30 g 2   • albuterol (PROAIR HFA) 108 (90 Base) MCG/ACT Aero Soln inhalation aerosol Inhale 2 Puffs by mouth every four hours as needed for Shortness of Breath (wheezing). 1 Inhaler 11   • ondansetron (ZOFRAN) 4 MG Tab tablet        Current Facility-Administered Medications on File Prior to Visit   Medication Dose Route Frequency Provider Last Rate Last Dose   • potassium chloride SA (Kdur) tablet 40 mEq  40 mEq Oral DAILY Ella De, A.P.N.       • heparin pf injection 500 Units  500 Units Intracatheter PRN Eric Hardy M.D.   500 Units at 03/09/18 1542   • heparin pf injection 500 Units  500 Units Intracatheter PRN " "Ella De, A.P.N.   500 Units at 08/22/17 0856       Review of Systems   Constitutional: Positive for malaise/fatigue (mild and tolerable) and weight loss (trying to eat better and losing some weight). Negative for chills and fever.   Respiratory: Negative for cough, shortness of breath and wheezing.    Cardiovascular: Positive for leg swelling (intermittently at times - more so with traveling). Negative for chest pain and palpitations.        Tends to get chest pain right after her disconnect day - takes Oxycodone which does help   Gastrointestinal: Negative for constipation, diarrhea, nausea and vomiting.        Ostomy output normal per patient    Genitourinary: Negative for dysuria.        Sometimes she has a slow stream   Musculoskeletal: Negative for myalgias.   Skin: Positive for itching (having some spots on her hand that are itchy - seeing derm tomorrow). Negative for rash.        Dry skin noted on her hands but no cracking or peeling    Neurological: Negative for dizziness and headaches (sometimes she will have a discomfort in her head that lasts a short while, but not defined as a HA).   Psychiatric/Behavioral: The patient does not have insomnia.           Objective:     /64   Pulse (!) 104   Temp 36.6 °C (97.9 °F)   Resp 18   Ht 1.66 m (5' 5.35\")   Wt 76 kg (167 lb 7 oz)   SpO2 100%   Breastfeeding? No   BMI 27.56 kg/m²      Physical Exam   Constitutional: She is oriented to person, place, and time. She appears well-developed and well-nourished. No distress.   HENT:   Head: Normocephalic and atraumatic.   Mouth/Throat: Oropharynx is clear and moist. No oropharyngeal exudate.   Cardiovascular: Regular rhythm, normal heart sounds and intact distal pulses.  Exam reveals no gallop and no friction rub.    No murmur heard.  Slightly tachycardic at 104   Pulmonary/Chest: Effort normal and breath sounds normal. No respiratory distress. She has no wheezes.   Abdominal: Soft. Bowel sounds are " normal. She exhibits no distension. There is no tenderness.   Musculoskeletal: Normal range of motion. She exhibits no edema or tenderness.   Neurological: She is alert and oriented to person, place, and time.   Skin: Skin is warm and dry. No rash noted. She is not diaphoretic. No erythema. No pallor.   Psychiatric: She has a normal mood and affect. Her behavior is normal.   Vitals reviewed.         [facility] on 08/21/2018   Component Date Value Ref Range Status   • WBC 08/21/2018 3.9* 4.8 - 10.8 K/uL Final   • RBC 08/21/2018 4.63  4.20 - 5.40 M/uL Final   • Hemoglobin 08/21/2018 12.8  12.0 - 16.0 g/dL Final   • Hematocrit 08/21/2018 40.2  37.0 - 47.0 % Final   • MCV 08/21/2018 86.8  81.4 - 97.8 fL Final   • MCH 08/21/2018 27.6  27.0 - 33.0 pg Final   • MCHC 08/21/2018 31.8* 33.6 - 35.0 g/dL Final   • RDW 08/21/2018 66.6* 35.9 - 50.0 fL Final   • Platelet Count 08/21/2018 206  164 - 446 K/uL Final   • MPV 08/21/2018 10.2  9.0 - 12.9 fL Final   • Nucleated RBC 08/21/2018 0.00  /100 WBC Final   • NRBC (Absolute) 08/21/2018 0.00  K/uL Final   • Neutrophils-Polys 08/21/2018 48.10  44.00 - 72.00 % Final   • Lymphocytes 08/21/2018 31.80  22.00 - 41.00 % Final   • Monocytes 08/21/2018 13.50* 0.00 - 13.40 % Final   • Eosinophils 08/21/2018 4.80  0.00 - 6.90 % Final   • Basophils 08/21/2018 1.30  0.00 - 1.80 % Final   • Immature Granulocytes 08/21/2018 0.50  0.00 - 0.90 % Final   • Neutrophils (Absolute) 08/21/2018 1.89* 2.00 - 7.15 K/uL Final    Includes immature neutrophils, if present.   • Lymphs (Absolute) 08/21/2018 1.25  1.00 - 4.80 K/uL Final   • Monos (Absolute) 08/21/2018 0.53  0.00 - 0.85 K/uL Final   • Eos (Absolute) 08/21/2018 0.19  0.00 - 0.51 K/uL Final   • Baso (Absolute) 08/21/2018 0.05  0.00 - 0.12 K/uL Final   • Immature Granulocytes (abs) 08/21/2018 0.02  0.00 - 0.11 K/uL Final   • Anisocytosis 08/21/2018 1+   Final   • Microcytosis 08/21/2018 1+   Final   • Sodium 08/21/2018 139  135 -  145 mmol/L Final   • Potassium 08/21/2018 3.2* 3.6 - 5.5 mmol/L Final   • Chloride 08/21/2018 106  96 - 112 mmol/L Final   • Co2 08/21/2018 22  20 - 33 mmol/L Final   • Anion Gap 08/21/2018 11.0  0.0 - 11.9 Final   • Glucose 08/21/2018 131* 65 - 99 mg/dL Final   • Bun 08/21/2018 21  8 - 22 mg/dL Final   • Creatinine 08/21/2018 1.22  0.50 - 1.40 mg/dL Final   • Calcium 08/21/2018 9.6  8.5 - 10.5 mg/dL Final   • AST(SGOT) 08/21/2018 38  12 - 45 U/L Final   • ALT(SGPT) 08/21/2018 32  2 - 50 U/L Final   • Alkaline Phosphatase 08/21/2018 180* 30 - 99 U/L Final   • Total Bilirubin 08/21/2018 0.8  0.1 - 1.5 mg/dL Final   • Albumin 08/21/2018 3.8  3.2 - 4.9 g/dL Final   • Total Protein 08/21/2018 6.6  6.0 - 8.2 g/dL Final   • Globulin 08/21/2018 2.8  1.9 - 3.5 g/dL Final   • A-G Ratio 08/21/2018 1.4  g/dL Final   • Carcinoembryonic Antigen 08/21/2018 7.7* 0.0 - 3.0 ng/mL Final    Comment: Effective September 17, 2013 the quantitative determination  of Carcinoembryonic Antigen (CEA) will now be performed at  [facility].  The Access CEA paramagnetic  particle chemiluminescent immunoassay is used.  Results  obtained with different test methods or kits cannot be used interchangeably.  Measurement of CEA has been shown to be  clinically relevant in the management of patients with  colorectal, breast, lung, prostatic, pancreatic, and ovarian  carcinomas.  Smokers may have slightly elevated levels of CEA.     • GFR If  08/21/2018 52* >60 mL/min/1.73 m 2 Final   • GFR If Non  08/21/2018 43* >60 mL/min/1.73 m 2 Final   • Peripheral Smear Review 08/21/2018 see below   Final    Comment: Due to instrument suspect flags, further review of peripheral smear is  indicated on this patient sample. This review may or may not result in  abnormal findings.     • Plt Estimation 08/21/2018 Normal   Final   • RBC Morphology 08/21/2018 Present   Final   • Large Platelets 08/21/2018 1+   Final   •  Polychromia 08/21/2018 1+   Final   • Poikilocytosis 08/21/2018 1+   Final   • Ovalocytes 08/21/2018 1+   Final   • Comments-Diff 08/21/2018 see below   Final    Results have been verified by peripheral blood smear review.         Assessment/Plan:     1. Rectal cancer (HCC)     2. Secondary malignant neoplasm of liver (HCC)     3. Hypokalemia         Plan  1. Patient is scheduled for receive cycle 58 of single agent 5FU today. I did review her labs today, CBC, CMP and CEA and labs are okay to proceed with treatment as planned.     CEA does continue to trend up and is at 7.7 today. I did discuss the results with the patient today.     2.. Patient with hypokalemia today as well as mild kidney dysfunction. She did take a dose of Lasix yesterday without taking potassium. Patient to take oral potassium replacement today of 40 meq for a potassium of 3.2. She is then to take 10 meq PO BID x5 days at home. I highly encouraged patient to ensure she takes Potassium when she takes her Lasix dose to prevent further hypokalemia. She did verbalize understanding. We will continue to monitor labs every 2 weeks with treatment.     3. Patient is scheduled for both Ct abdomen ordered by Dr. Mclaughlin, surgeon on Sept 7 and PET CT ordered by Dr. Hardy on Sept 13. Did discuss with Dr. Hardy and he would like to proceed with CT per Dr. Mclaughlin and cancel PET CT at this time. Patient to follow up with Dr. Hardy in approx 3-4 weeks to review the results of imaging. Patient verbalized understanding of the plan.     4. She is to follow up in 3-4 weeks or sooner if needed with Dr. Hardy.

## 2018-08-21 NOTE — PROGRESS NOTES
Patient arrived ambulatory to the [facility] for port access/pre-chemo labs. Reviewed vital signs, labs, and physician order. Patient denies S&S of infection, or bleeing. Pt arrives with Emla cream applied to left chest port, port accessed with sterile technique, visualized brisk blood return, labs collected per MD order. Port flushed per protocol, catheter remained accessed as pt sees MD, will return for treatment after apt. Confirmed upcoming appointment date and time with patient. Patient left the [facility] ambulatory in no sign of distress.

## 2018-08-21 NOTE — PROGRESS NOTES
Chemotherapy Verification - SECONDARY RN       Height = 166 cm  Weight = 75.9 kg  BSA = 1.87 m2       Medication: Fluorouracil (5FU) home pump Dose: 1920 mg/m2  Calculated Dose: 3590.4 mg over 46 hours                              I confirm that this process was performed independently.

## 2018-08-21 NOTE — PROGRESS NOTES
"Pharmacy Chemotherapy Verification Note:    Patient Name: Karlene Walters      Dx: Colon CA        Cycle 66 ([place] cycle 58)   Previous treatment: C65 = 8/7/18    Protocol: 5-FU+Leucovorin       IV over 2 hours on Day 1, followed by   IVP on Day 1, followed by    - Dose reduced by 20% to 320 mg/m² starting with Cycle 18 ([place] #11) on 7/5/16 due to neutropenia    10/31/17: delete Leucovorin and 5-FU push d/t neutropenia per Dr. Hardy   Fluorouracil 1200 mg/m²  IV continuous infusion daily on Days 1 and 2 (2400 mg/m² IV over 46-48 hours) -    - Infusion Dose reduced by 20% starting with Cycle 18 ([place] #11) on 7/5/16 due to neutropenia   -20% reduction (1920 mg/m2) to be continued starting C28 per C Alsop APRN   14 day cycles for 12 cycles (adjuvant) or until disease progression or unacceptable toxicity  NCCN Guidelines for Colon Cancer. V.2.2015.  Jay GROVER, et al. Eur J Cancer.1999;35(9):1343-7.      Allergies:  Codeine; Oxaliplatin; Pcn; Sulfa drugs; and Tape     /73   Pulse (!) 115   Temp 36.3 °C (97.3 °F)   Resp 18   Ht 1.66 m (5' 5.35\")   Wt 75.9 kg (167 lb 5.3 oz)   SpO2 99%   BMI 27.54 kg/m²    Body surface area is 1.87 meters squared.     LABS 8/21/2018:  ANC: 1890      WBC: 3.9     Plt: 206k   Hgb/Hct: 12.8/40.2 SCr = 1.22 mg/dL CrCl = 46 mL/min   AST/ALT/AP = 38/32/180  TBili = 0.8     CMP on even cycles per TP       Fluorouracil (5-FU) 1920 mg/m² x 1.87 m² = 3590.4 mg              <5% difference, OK to treat with final dose = 3590 mg    CIVI over 46 hours Via home infusion pump at 1.6 ml/hr                   Demetrius Maldonado, PharmD    "

## 2018-08-22 ENCOUNTER — TELEPHONE (OUTPATIENT)
Dept: HEMATOLOGY ONCOLOGY | Facility: MEDICAL CENTER | Age: 78
End: 2018-08-22

## 2018-08-22 ENCOUNTER — APPOINTMENT (RX ONLY)
Dept: URBAN - METROPOLITAN AREA CLINIC 4 | Facility: CLINIC | Age: 78
Setting detail: DERMATOLOGY
End: 2018-08-22

## 2018-08-22 DIAGNOSIS — L57.0 ACTINIC KERATOSIS: ICD-10-CM

## 2018-08-22 DIAGNOSIS — D18.0 HEMANGIOMA: ICD-10-CM

## 2018-08-22 DIAGNOSIS — L82.1 OTHER SEBORRHEIC KERATOSIS: ICD-10-CM

## 2018-08-22 DIAGNOSIS — D22 MELANOCYTIC NEVI: ICD-10-CM

## 2018-08-22 DIAGNOSIS — L81.4 OTHER MELANIN HYPERPIGMENTATION: ICD-10-CM

## 2018-08-22 DIAGNOSIS — L71.8 OTHER ROSACEA: ICD-10-CM

## 2018-08-22 PROBLEM — D22.61 MELANOCYTIC NEVI OF RIGHT UPPER LIMB, INCLUDING SHOULDER: Status: ACTIVE | Noted: 2018-08-22

## 2018-08-22 PROBLEM — D22.5 MELANOCYTIC NEVI OF TRUNK: Status: ACTIVE | Noted: 2018-08-22

## 2018-08-22 PROBLEM — D18.01 HEMANGIOMA OF SKIN AND SUBCUTANEOUS TISSUE: Status: ACTIVE | Noted: 2018-08-22

## 2018-08-22 PROBLEM — D22.72 MELANOCYTIC NEVI OF LEFT LOWER LIMB, INCLUDING HIP: Status: ACTIVE | Noted: 2018-08-22

## 2018-08-22 PROBLEM — D22.71 MELANOCYTIC NEVI OF RIGHT LOWER LIMB, INCLUDING HIP: Status: ACTIVE | Noted: 2018-08-22

## 2018-08-22 PROBLEM — D22.62 MELANOCYTIC NEVI OF LEFT UPPER LIMB, INCLUDING SHOULDER: Status: ACTIVE | Noted: 2018-08-22

## 2018-08-22 PROCEDURE — ? PRESCRIPTION

## 2018-08-22 PROCEDURE — 17003 DESTRUCT PREMALG LES 2-14: CPT

## 2018-08-22 PROCEDURE — 17000 DESTRUCT PREMALG LESION: CPT

## 2018-08-22 PROCEDURE — ? SUNSCREEN RECOMMENDATIONS

## 2018-08-22 PROCEDURE — ? COUNSELING

## 2018-08-22 PROCEDURE — ? LIQUID NITROGEN

## 2018-08-22 PROCEDURE — 99213 OFFICE O/P EST LOW 20 MIN: CPT | Mod: 25

## 2018-08-22 RX ORDER — METRONIDAZOLE 7.5 MG/G
CREAM TOPICAL BID
Qty: 1 | Refills: 5 | Status: ERX | COMMUNITY
Start: 2018-08-22

## 2018-08-22 RX ADMIN — METRONIDAZOLE 1: 7.5 CREAM TOPICAL at 00:00

## 2018-08-22 ASSESSMENT — LOCATION SIMPLE DESCRIPTION DERM
LOCATION SIMPLE: LEFT UPPER BACK
LOCATION SIMPLE: RIGHT LOWER BACK
LOCATION SIMPLE: RIGHT HAND
LOCATION SIMPLE: RIGHT THIGH
LOCATION SIMPLE: LEFT FOREARM
LOCATION SIMPLE: LEFT FOREHEAD
LOCATION SIMPLE: ABDOMEN
LOCATION SIMPLE: RIGHT FOREARM
LOCATION SIMPLE: LEFT THIGH
LOCATION SIMPLE: RIGHT POSTERIOR UPPER ARM
LOCATION SIMPLE: LEFT ANTERIOR NECK
LOCATION SIMPLE: LEFT POSTERIOR UPPER ARM
LOCATION SIMPLE: LEFT HAND
LOCATION SIMPLE: UPPER BACK
LOCATION SIMPLE: RIGHT CHEEK
LOCATION SIMPLE: LEFT CHEEK

## 2018-08-22 ASSESSMENT — LOCATION ZONE DERM
LOCATION ZONE: TRUNK
LOCATION ZONE: FACE
LOCATION ZONE: HAND
LOCATION ZONE: ARM
LOCATION ZONE: LEG
LOCATION ZONE: NECK

## 2018-08-22 ASSESSMENT — LOCATION DETAILED DESCRIPTION DERM
LOCATION DETAILED: LEFT ANTERIOR PROXIMAL THIGH
LOCATION DETAILED: RIGHT RADIAL DORSAL HAND
LOCATION DETAILED: LEFT MEDIAL MALAR CHEEK
LOCATION DETAILED: RIGHT SUPERIOR MEDIAL MIDBACK
LOCATION DETAILED: LEFT DORSAL MIDDLE FINGER METACARPOPHALANGEAL JOINT
LOCATION DETAILED: RIGHT PROXIMAL DORSAL FOREARM
LOCATION DETAILED: LEFT ULNAR DORSAL HAND
LOCATION DETAILED: RIGHT DISTAL POSTERIOR UPPER ARM
LOCATION DETAILED: LEFT INFERIOR ANTERIOR NECK
LOCATION DETAILED: LEFT CENTRAL MALAR CHEEK
LOCATION DETAILED: LEFT PROXIMAL DORSAL FOREARM
LOCATION DETAILED: RIGHT INFERIOR CENTRAL MALAR CHEEK
LOCATION DETAILED: PERIUMBILICAL SKIN
LOCATION DETAILED: LEFT SUPERIOR MEDIAL UPPER BACK
LOCATION DETAILED: LEFT FOREHEAD
LOCATION DETAILED: LEFT PROXIMAL POSTERIOR UPPER ARM
LOCATION DETAILED: RIGHT RIB CAGE
LOCATION DETAILED: RIGHT CENTRAL MALAR CHEEK
LOCATION DETAILED: LEFT INFERIOR MEDIAL MALAR CHEEK
LOCATION DETAILED: RIGHT ANTERIOR PROXIMAL THIGH
LOCATION DETAILED: SUPERIOR THORACIC SPINE

## 2018-08-22 NOTE — PROCEDURE: LIQUID NITROGEN
Duration Of Freeze Thaw-Cycle (Seconds): 3
Number Of Freeze-Thaw Cycles: 2 freeze-thaw cycles
Consent: The patient's consent was obtained including but not limited to risks of crusting, scabbing, blistering, scarring, darker or lighter pigmentary change, recurrence, incomplete removal and infection.
Detail Level: Simple
Render Post-Care Instructions In Note?: yes
Post-Care Instructions: I reviewed with the patient in detail post-care instructions. Patient is to wear sunprotection, and avoid picking at any of the treated lesions. Pt may apply Vaseline to crusted or scabbing areas.

## 2018-08-22 NOTE — TELEPHONE ENCOUNTER
I left a message for Karlene to return my call in regards to getting her in to see Dr. Hardy on 9/7/18 after her CT Scan so she does not have to wait until 9/13/18 to get these results. I scheduled patient on 9/7/18 at 3:40 pm to see Dr. Hardy and have contacted scheduling to get a STAT read on the report.    Please give patient this information once she returns our call.

## 2018-08-22 NOTE — TELEPHONE ENCOUNTER
I spoke to Andrei in Imaging Scheduling, he made notes to the Radiologist that we need a STAT read as patient is being seen by our office in the afternoon.

## 2018-08-23 ENCOUNTER — OUTPATIENT INFUSION SERVICES (OUTPATIENT)
Dept: ONCOLOGY | Facility: MEDICAL CENTER | Age: 78
End: 2018-08-23
Attending: INTERNAL MEDICINE
Payer: MEDICARE

## 2018-08-23 VITALS
OXYGEN SATURATION: 100 % | HEIGHT: 65 IN | SYSTOLIC BLOOD PRESSURE: 102 MMHG | WEIGHT: 170.19 LBS | DIASTOLIC BLOOD PRESSURE: 53 MMHG | RESPIRATION RATE: 18 BRPM | BODY MASS INDEX: 28.36 KG/M2 | HEART RATE: 97 BPM | TEMPERATURE: 97.6 F

## 2018-08-23 PROCEDURE — 96523 IRRIG DRUG DELIVERY DEVICE: CPT

## 2018-08-23 PROCEDURE — 700111 HCHG RX REV CODE 636 W/ 250 OVERRIDE (IP)

## 2018-08-23 RX ADMIN — HEPARIN 500 UNITS: 100 SYRINGE at 11:37

## 2018-08-23 ASSESSMENT — PAIN SCALES - GENERAL: PAINLEVEL_OUTOF10: 0

## 2018-09-01 NOTE — PROGRESS NOTES
"Pharmacy Chemotherapy Verification Note:    Patient Name: Karlene Walters      Dx: Colon CA        Protocol: 5-FU + Leucovorin      *Dosing Reference*   IV over 2 hours on Day 1, followed by    IVP on Day 1, followed by         -- 10/31/17: delete Leucovorin and 5-FU push d/t neutropenia per Dr. Hardy   Fluorouracil   IV continuous infusion daily on Days 1 and 2 ( IV over 46-48 hours)        -- 20% reduction (1920 mg/m²) to be continued starting C28 per C Alsop APRN   14 day cycles for 12 cycles (adjuvant) or until disease progression or unacceptable toxicity  NCCN Guidelines for Colon Cancer. V.2.2015.  Jay T, et al. Eur J Cancer.1999;35(9):1343-7.      Allergies:  Codeine; Oxaliplatin; Pcn; Sulfa drugs; and Tape     /69   Pulse (!) 110   Temp 36.4 °C (97.5 °F)   Resp 18   Ht 1.651 m (5' 5\")   Wt 76.9 kg (169 lb 8.5 oz)   SpO2 99%   BMI 28.21 kg/m²  Body surface area is 1.88 meters squared.     Labs:  9/4/18:  ANC~ 2020   Plt = 192 k     Hgb = 12.4    8/21/18:  SCr = 1.22 mg/dL  CrCl ~ 46 mL/min   AST/ALT/AP = 38/32/180 (no adjustments recommended) TBili = 0.8       Drug Order   (Drug name, dose, route, IV Fluid & volume, frequency, number of doses) Cycle 67 (C59 in [place])  Previous treatment: C66 = 8/21/18     Medication = Fluorouracil (5-FU)   Base Dose = 1920 mg/m²  Calc Dose:Base Dose x 1.88 m² = 3610 mg  Final Dose = 3610 mg   Route = CIVI  Drug Conc = 50 mg/mL  Fluid & Volume = 72.2 mL (+ 3 mL overfill = 75.2 mL)  Admin Duration = CIVI over 46 hrs @ 1.6 mL/hr   Via CADD pump for home infusion        <5% difference, ok to treat with final dose     By my signature below, I confirm this process was performed independently with the BSA and all final chemotherapy dosing calculations congruent. I have reviewed the above chemotherapy order and that my calculation of the final dose and BSA (when applicable) corroborate those calculations of the  pharmacist.       Yi Knott, " PharmD

## 2018-09-03 NOTE — PROGRESS NOTES
"Pharmacy Chemotherapy Verification Note:    Patient Name: Karlene Walters      Dx: Colon CA        Cycle 67 ([place] cycle 59)   Previous treatment: C66 = 8/21/18    Protocol: 5-FU+Leucovorin       IV over 2 hours on Day 1, followed by   IVP on Day 1, followed by    - Dose reduced by 20% to 320 mg/m² starting with Cycle 18 ([place] #11) on 7/5/16 due to neutropenia    10/31/17: delete Leucovorin and 5-FU push d/t neutropenia per Dr. Hardy   Fluorouracil 1200 mg/m²  IV continuous infusion daily on Days 1 and 2 (2400 mg/m² IV over 46-48 hours) -    - Infusion Dose reduced by 20% starting with Cycle 18 ([place] #11) on 7/5/16 due to neutropenia   -20% reduction (1920 mg/m2) to be continued starting C28 per C Alsop APRN   14 day cycles for 12 cycles (adjuvant) or until disease progression or unacceptable toxicity  NCCN Guidelines for Colon Cancer. V.2.2015.  Jay GROVER, et al. Eur J Cancer.1999;35(9):1343-7.      Allergies:  Codeine; Oxaliplatin; Pcn; Sulfa drugs; and Tape     /69   Pulse (!) 110   Temp 36.4 °C (97.5 °F)   Resp 18   Ht 1.651 m (5' 5\")   Wt 76.9 kg (169 lb 8.5 oz)   SpO2 99%   BMI 28.21 kg/m²    Body surface area is 1.88 meters squared.     ANC~ 2020 Plt = 192k   Hgb = 12.4     CMP on even cycles per TP       Fluorouracil (5-FU) 1920 mg/m² x 1.88m² = 3609mg              <5% difference, OK to treat with final dose = 3610mg    CIVI over 46 hours Via home infusion pump at 1.6ml/hr                   Nakia Arroyo, PharmD    "

## 2018-09-04 ENCOUNTER — APPOINTMENT (OUTPATIENT)
Dept: ONCOLOGY | Facility: MEDICAL CENTER | Age: 78
End: 2018-09-04
Attending: INTERNAL MEDICINE
Payer: MEDICARE

## 2018-09-04 VITALS
HEIGHT: 65 IN | RESPIRATION RATE: 18 BRPM | DIASTOLIC BLOOD PRESSURE: 69 MMHG | OXYGEN SATURATION: 99 % | SYSTOLIC BLOOD PRESSURE: 123 MMHG | HEART RATE: 110 BPM | WEIGHT: 169.53 LBS | TEMPERATURE: 97.5 F | BODY MASS INDEX: 28.25 KG/M2

## 2018-09-04 DIAGNOSIS — R94.4 DECREASED GFR: ICD-10-CM

## 2018-09-04 DIAGNOSIS — C20 RECTAL CANCER (HCC): ICD-10-CM

## 2018-09-04 LAB
BASOPHILS # BLD AUTO: 1.1 % (ref 0–1.8)
BASOPHILS # BLD: 0.04 K/UL (ref 0–0.12)
EOSINOPHIL # BLD AUTO: 0.15 K/UL (ref 0–0.51)
EOSINOPHIL NFR BLD: 4.1 % (ref 0–6.9)
ERYTHROCYTE [DISTWIDTH] IN BLOOD BY AUTOMATED COUNT: 64.6 FL (ref 35.9–50)
HCT VFR BLD AUTO: 39 % (ref 37–47)
HGB BLD-MCNC: 12.4 G/DL (ref 12–16)
IMM GRANULOCYTES # BLD AUTO: 0.01 K/UL (ref 0–0.11)
IMM GRANULOCYTES NFR BLD AUTO: 0.3 % (ref 0–0.9)
LYMPHOCYTES # BLD AUTO: 0.95 K/UL (ref 1–4.8)
LYMPHOCYTES NFR BLD: 26.2 % (ref 22–41)
MCH RBC QN AUTO: 27.7 PG (ref 27–33)
MCHC RBC AUTO-ENTMCNC: 31.8 G/DL (ref 33.6–35)
MCV RBC AUTO: 87.2 FL (ref 81.4–97.8)
MONOCYTES # BLD AUTO: 0.46 K/UL (ref 0–0.85)
MONOCYTES NFR BLD AUTO: 12.7 % (ref 0–13.4)
NEUTROPHILS # BLD AUTO: 2.02 K/UL (ref 2–7.15)
NEUTROPHILS NFR BLD: 55.6 % (ref 44–72)
NRBC # BLD AUTO: 0 K/UL
NRBC BLD-RTO: 0 /100 WBC
PLATELET # BLD AUTO: 192 K/UL (ref 164–446)
PMV BLD AUTO: 10.6 FL (ref 9–12.9)
RBC # BLD AUTO: 4.47 M/UL (ref 4.2–5.4)
WBC # BLD AUTO: 3.6 K/UL (ref 4.8–10.8)

## 2018-09-04 PROCEDURE — 700111 HCHG RX REV CODE 636 W/ 250 OVERRIDE (IP): Performed by: NURSE PRACTITIONER

## 2018-09-04 PROCEDURE — A4212 NON CORING NEEDLE OR STYLET: HCPCS

## 2018-09-04 PROCEDURE — 85025 COMPLETE CBC W/AUTO DIFF WBC: CPT

## 2018-09-04 PROCEDURE — 96374 THER/PROPH/DIAG INJ IV PUSH: CPT | Mod: XU

## 2018-09-04 PROCEDURE — G0498 CHEMO EXTEND IV INFUS W/PUMP: HCPCS

## 2018-09-04 PROCEDURE — 96375 TX/PRO/DX INJ NEW DRUG ADDON: CPT

## 2018-09-04 RX ORDER — PROCHLORPERAZINE MALEATE 10 MG
10 TABLET ORAL EVERY 6 HOURS PRN
Status: CANCELLED | OUTPATIENT
Start: 2018-09-04

## 2018-09-04 RX ORDER — DEXAMETHASONE SODIUM PHOSPHATE 4 MG/ML
8 INJECTION, SOLUTION INTRA-ARTICULAR; INTRALESIONAL; INTRAMUSCULAR; INTRAVENOUS; SOFT TISSUE ONCE
Status: COMPLETED | OUTPATIENT
Start: 2018-09-04 | End: 2018-09-04

## 2018-09-04 RX ORDER — LIDOCAINE HYDROCHLORIDE 10 MG/ML
10 INJECTION, SOLUTION INFILTRATION; PERINEURAL ONCE
Status: CANCELLED | OUTPATIENT
Start: 2018-09-04 | End: 2018-09-04

## 2018-09-04 RX ORDER — METRONIDAZOLE 7.5 MG/G
GEL VAGINAL
COMMUNITY
End: 2018-09-20 | Stop reason: CLARIF

## 2018-09-04 RX ORDER — ONDANSETRON 8 MG/1
8 TABLET, ORALLY DISINTEGRATING ORAL
Status: CANCELLED | OUTPATIENT
Start: 2018-09-04

## 2018-09-04 RX ORDER — DEXTROSE MONOHYDRATE 50 MG/ML
INJECTION, SOLUTION INTRAVENOUS CONTINUOUS
Status: CANCELLED | OUTPATIENT
Start: 2018-09-04

## 2018-09-04 RX ORDER — ONDANSETRON 2 MG/ML
4 INJECTION INTRAMUSCULAR; INTRAVENOUS
Status: CANCELLED | OUTPATIENT
Start: 2018-09-04

## 2018-09-04 RX ORDER — DEXAMETHASONE SODIUM PHOSPHATE 4 MG/ML
8 INJECTION, SOLUTION INTRA-ARTICULAR; INTRALESIONAL; INTRAMUSCULAR; INTRAVENOUS; SOFT TISSUE ONCE
Status: CANCELLED | OUTPATIENT
Start: 2018-09-04 | End: 2018-09-04

## 2018-09-04 RX ADMIN — FLUOROURACIL 3610 MG: 50 INJECTION, SOLUTION INTRAVENOUS at 10:54

## 2018-09-04 RX ADMIN — DEXAMETHASONE SODIUM PHOSPHATE 8 MG: 4 INJECTION, SOLUTION INTRAMUSCULAR; INTRAVENOUS at 10:26

## 2018-09-04 ASSESSMENT — PAIN SCALES - GENERAL: PAINLEVEL_OUTOF10: 0

## 2018-09-04 NOTE — PROGRESS NOTES
Chemotherapy Verification - PRIMARY RN      Height = 165.1cm  Weight = 76.9kg  BSA = 1.88m2       Medication: 5FU  Dose: 1920mg/m2  Calculated Dose: 3609mg CADD pump                            (In mg/m2, AUC, mg/kg)     I confirm this process was performed independently with the BSA and all final chemotherapy dosing calculations congruent.  Any discrepancies of 5% or greater have been addressed with the chemotherapy pharmacist. The resolution of the discrepancy has been documented in the EPIC progress notes.

## 2018-09-04 NOTE — PROGRESS NOTES
Chemotherapy Verification - SECONDARY RN       Height = 65 in  Weight = 76.9 kg  BSA = 1.88 m2       Medication: 5 FU pump  Dose: 1920 mg/m2 over 46 hours  Calculated Dose: 3609 mg over 46 hours                             (In mg/m2, AUC, mg/kg)     I confirm that this process was performed independently.

## 2018-09-04 NOTE — PROGRESS NOTES
Patient arrived for Cycle 59, 5FU pump connect.  Pt reports no significant changes or concerns. Emla cream removed, port accessed with Power port needle, for CT scan scheduled Friday.  Blood return noted, labs drawn. Results reviewed and within parameters for treatment.  Pre-med given.  CADD pump connected and set to run.  Confirmed de-access appt on Thursday.  Port to remain accessed for CT Friday.  Discharged home in great spirits under no apparent distress.

## 2018-09-06 ENCOUNTER — OUTPATIENT INFUSION SERVICES (OUTPATIENT)
Dept: ONCOLOGY | Facility: MEDICAL CENTER | Age: 78
End: 2018-09-06
Attending: INTERNAL MEDICINE
Payer: MEDICARE

## 2018-09-06 VITALS
WEIGHT: 171.74 LBS | SYSTOLIC BLOOD PRESSURE: 118 MMHG | HEART RATE: 95 BPM | OXYGEN SATURATION: 97 % | DIASTOLIC BLOOD PRESSURE: 61 MMHG | RESPIRATION RATE: 18 BRPM | TEMPERATURE: 97.4 F | HEIGHT: 65 IN | BODY MASS INDEX: 28.61 KG/M2

## 2018-09-06 DIAGNOSIS — R94.4 DECREASED GFR: ICD-10-CM

## 2018-09-06 DIAGNOSIS — C20 RECTAL CANCER (HCC): ICD-10-CM

## 2018-09-06 PROCEDURE — 96523 IRRIG DRUG DELIVERY DEVICE: CPT

## 2018-09-06 PROCEDURE — 700111 HCHG RX REV CODE 636 W/ 250 OVERRIDE (IP)

## 2018-09-06 RX ADMIN — HEPARIN 500 UNITS: 100 SYRINGE at 11:25

## 2018-09-06 ASSESSMENT — PAIN SCALES - GENERAL: PAINLEVEL_OUTOF10: 0

## 2018-09-06 NOTE — PROGRESS NOTES
Pt arrived ambulatory to IS for home pump disconnect.  Chemotherapy completed without issue, pt tolerated well.  Pump cartridge appears empty, ordered dose delivered.  Port flushed per policy and left accessed per pt request for scheduled CT tomorrow, 9/7.  [facility]  Pt discharged from IS in NAD under self care.

## 2018-09-07 ENCOUNTER — OFFICE VISIT (OUTPATIENT)
Dept: HEMATOLOGY ONCOLOGY | Facility: MEDICAL CENTER | Age: 78
End: 2018-09-07
Payer: MEDICARE

## 2018-09-07 ENCOUNTER — HOSPITAL ENCOUNTER (OUTPATIENT)
Dept: RADIOLOGY | Facility: MEDICAL CENTER | Age: 78
End: 2018-09-07
Attending: SURGERY
Payer: MEDICARE

## 2018-09-07 VITALS
SYSTOLIC BLOOD PRESSURE: 152 MMHG | HEIGHT: 65 IN | DIASTOLIC BLOOD PRESSURE: 98 MMHG | BODY MASS INDEX: 28.56 KG/M2 | RESPIRATION RATE: 18 BRPM | OXYGEN SATURATION: 96 % | HEART RATE: 98 BPM | TEMPERATURE: 98.6 F | WEIGHT: 171.41 LBS

## 2018-09-07 DIAGNOSIS — J43.9 PULMONARY EMPHYSEMA, UNSPECIFIED EMPHYSEMA TYPE (HCC): Chronic | ICD-10-CM

## 2018-09-07 DIAGNOSIS — C78.7 SECONDARY MALIGNANT NEOPLASM OF LIVER (HCC): ICD-10-CM

## 2018-09-07 DIAGNOSIS — C20 MALIGNANT NEOPLASM OF RECTUM (HCC): ICD-10-CM

## 2018-09-07 DIAGNOSIS — C20 RECTAL CANCER (HCC): ICD-10-CM

## 2018-09-07 DIAGNOSIS — Z86.711 HISTORY OF PULMONARY EMBOLISM: ICD-10-CM

## 2018-09-07 PROCEDURE — 99214 OFFICE O/P EST MOD 30 MIN: CPT | Performed by: INTERNAL MEDICINE

## 2018-09-07 PROCEDURE — 700117 HCHG RX CONTRAST REV CODE 255: Performed by: SURGERY

## 2018-09-07 PROCEDURE — 74170 CT ABD WO CNTRST FLWD CNTRST: CPT

## 2018-09-07 RX ORDER — LIDOCAINE HYDROCHLORIDE 10 MG/ML
10 INJECTION, SOLUTION INFILTRATION; PERINEURAL ONCE
Status: CANCELLED | OUTPATIENT
Start: 2018-09-18 | End: 2018-09-18

## 2018-09-07 RX ORDER — DEXAMETHASONE SODIUM PHOSPHATE 4 MG/ML
8 INJECTION, SOLUTION INTRA-ARTICULAR; INTRALESIONAL; INTRAMUSCULAR; INTRAVENOUS; SOFT TISSUE ONCE
Status: CANCELLED | OUTPATIENT
Start: 2018-09-18 | End: 2018-09-18

## 2018-09-07 RX ORDER — PROCHLORPERAZINE MALEATE 10 MG
10 TABLET ORAL EVERY 6 HOURS PRN
Status: CANCELLED | OUTPATIENT
Start: 2018-09-18

## 2018-09-07 RX ORDER — DEXTROSE MONOHYDRATE 50 MG/ML
INJECTION, SOLUTION INTRAVENOUS CONTINUOUS
Status: CANCELLED | OUTPATIENT
Start: 2018-09-18

## 2018-09-07 RX ORDER — ONDANSETRON 8 MG/1
8 TABLET, ORALLY DISINTEGRATING ORAL
Status: CANCELLED | OUTPATIENT
Start: 2018-09-18

## 2018-09-07 RX ORDER — ONDANSETRON 2 MG/ML
4 INJECTION INTRAMUSCULAR; INTRAVENOUS
Status: CANCELLED | OUTPATIENT
Start: 2018-09-18

## 2018-09-07 RX ADMIN — IOHEXOL 100 ML: 350 INJECTION, SOLUTION INTRAVENOUS at 13:00

## 2018-09-07 ASSESSMENT — PAIN SCALES - GENERAL: PAINLEVEL: NO PAIN

## 2018-09-10 NOTE — PROGRESS NOTES
Date of visit: 9/7/2018  7:08 PM      Chief Complaint- Metastatic rectal cancer to liver-KRS mutated      Identification/Prior relevant history: Originally diagnosed with a rectal carcinoma in 2012 secondary to bleeding. s/pt laparoscopic resection with primary anastomosis and final pathology was consistent with well-differentiated adenocarcinoma with 0/30 nodes. She was staged pT2 N0. 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      8/2015 she was diagnosed with metastatic disease to the liver with a 2.4 cm mass as well as small pulmonary nodules and thyroid nodules. She underwent an ablative therapy to the liver. She was then started on Xelox. She had  allergic reaction to oxaliplatin and was continued on Xeloda. This eventually led to increased diarrhea and her regimen was changed to single agent 5-FU. She has been tolerating the therapy well with good responses on imaging.      3/2017- CT of the chest abdomen pelvis which has been stable and no evidence of disease. CT of the wrist was consistent with tenosynovitis and degenerative changes.     5/17-established care with me   EGD and colonoscopy  did not reveal any local recurrence. She had some gastritis.   - CEA has trended up to 13  -PET scan isolated hepatic lesion. Patchy basilar pulmonary opacities. Seen by Dr. West referred for Y90 embolization  6/17- Y90 Theraspheres.  Continued on 5-FU.  -CEA trending down/normalized     -CT abdomen and-3/9/18- Findings are consistent with posttreatment changes in this portion of the liver after Y 90 procedure.  Interim history-     Restarted 5-FU maintenance, she tends to develop transient chest pain and some palmar erythema. A few days. Tears, chronic shortness of breath with occasional flareups. Follow-up with pulmonary.  6/29/80-CT chest negative  CEA tumor markers have been trending  up.  9/7/18-No definite new liver lesion identified.    Redemonstration of an oval low-attenuation area in the hepatic dome, measuring 2.1 x 3.4 cm, likely related to post treatment change.    The entire right hepatic lobe is very heterogeneous, similar to prior    Past Medical History:      Past Medical History:   Diagnosis Date   • Arrhythmia    • Blood clotting disorder (HCC) 08/2015    bilat PE    • Blood transfusion 1957   • Cancer (HCC) 09/2012    rectal cancer,  Liver CA-2015   • CATARACT     removed b/l   • Chemotherapy-induced neutropenia (HCC) 9/28/2016   • Chickenpox    • Colon cancer (HCC)    • Dental disorder     dentures UPPERS AND LOWERS   • Elevated alkaline phosphatase level 11/15/2017   • Fall    • [place] measles    • Hemorrhagic disorder (HCC)     on Xarelto    • Hypercholesteremia    • Hypertension     no meds currently    • Ileostomy in place (HCC)    • Indigestion    • Influenza    • Lightheadedness 9/17/2015   • Mumps    • Obstruction     ileostomy   • Other specified symptom associated with female genital organs     HYSTERECTOMY 1993   • Pneumonia 05/2017    tx'd with antibx   • Pulmonary embolism (HCC)    • 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)    • Renal insufficiency 3/14/2016   • Routine general medical examination at a health care facility 3/14/2016    3/14/16   • Seasonal allergies    • Tonsillitis        Past surgical history:       Past Surgical History:   Procedure Laterality Date   • COLONOSCOPY WITH BIOPSY  8/23/2015    Procedure: COLONOSCOPY WITH BIOPSY;  Surgeon: Wilbur Glasgow M.D.;  Location: ENDOSCOPY [facility];  Service:    • HEPATIC ABLATION LAPAROSCOPIC  7/6/2015    Procedure: HEPATIC ABLATION LAPAROSCOPIC.;  Surgeon: Kit West M.D.;  Location: SURGERY [facility];  Service:    • CATH PLACEMENT Left 7/6/2015    Procedure: CATH PLACEMENT CEPHALIC POWERPORT;  Surgeon: Kit West M.D.;  Location: SURGERY [facility];   Service:    • FISTULA IN ANO REPAIR  1/28/2015    Performed by Kolton Montgomery M.D. at SURGERY [facility]   • PERINEAL PROCEDURE  1/28/2015    Performed by Kolton Montgomery M.D. at [facility]   • FISTULA IN ANO REPAIR  10/15/2014    Performed by Kolton Montgomery M.D. at SURGERY [facility]   • PERINEAL PROCEDURE  10/15/2014    Performed by Kolton Montgomery M.D. at [facility]   • IRRIGATION & DEBRIDEMENT GENERAL  2/19/2014    Performed by Kolton Montgomery M.D. at [facility]   • FLAP GRAFT  2/19/2014    Performed by Kolton Montgomery M.D. at [facility]   • PERINEAL PROCEDURE  12/18/2013    Performed by Kolton Montgomery M.D. at [facility]   • MYOCUTANEOUS FLAP  12/18/2013    Performed by Kolton Montgomery M.D. at SURGERY [facility]   • IRRIGATION & DEBRIDEMENT GENERAL  10/23/2013    Performed by Kolton Montgomery M.D. at [facility]   • FISTULA IN ANO REPAIR  9/4/2013    Performed by Kolton Montgomery M.D. at SURGERY [facility]   • FLAP ROTATION  9/4/2013    Performed by Kolton Montgomery M.D. at [facility]   • RECOVERY  8/26/2013    Performed by Cath-Recovery Surgery at SURGERY SAME DAY [facility]   • PROCTOSCOPY  7/17/2013    Performed by Kolton Montgomery M.D. at SURGERY [facility]   • BIOPSY GENERAL  7/17/2013    Performed by Kolton Montgomery M.D. at SURGERY [facility]   • SIGMOIDOSCOPY  2/27/2013    Performed by Kolton Montgomery M.D. at SURGERY [facility]   • FISTULA IN ANO REPAIR  2/27/2013    Performed by Kolton Montgomery M.D. at [facility]   • LAPAROSCOPY  10/8/2012    Performed by Kolton Montgomery M.D. at [facility]   • ILEO LOOP DIVERSION  10/8/2012    Performed by Kolton Montgomery M.D. at [facility]   • COLECTOMY  9/26/2012    Performed by Kolton Montgomery M.D. at SURGERY [facility]   • COLONOSCOPY FLEX W/ENDO US  9/20/2012    Performed by Harshil Bolton M.D. at SURGERY [facility]   • OTHER   2009    left eye torn retina repair   • CATARACT EXTRACTION WITH IOL  2004    bilateral   • ABDOMINAL HYSTERECTOMY TOTAL  1983   • CYST EXCISION  1972    ovarian   • COLON RESECTION     • EYE SURGERY      cataracts   • HYSTERECTOMY LAPAROSCOPY     • PB REMV 2ND CATARACT,CORN-SCLER SECTN     • TONSILLECTOMY         Allergies:       Codeine; Oxaliplatin; Pcn [penicillins]; Sulfa drugs; and Tape    Medications:         Current Outpatient Prescriptions   Medication Sig Dispense Refill   • metroNIDAZOLE (METROGEL-VAGINAL) 0.75 % Gel Insert  in vagina every bedtime.     • raNITidine (ZANTAC) 150 MG Tab TAKE 1 TABLET 2 TIMES A DAY 60 Tab 3   • rivaroxaban (XARELTO) 10 MG Tab tablet Take 1 Tab by mouth every day. 30 Tab 6   • Tiotropium Bromide-Olodaterol (STIOLTO RESPIMAT) 2.5-2.5 MCG/ACT Aero Soln Take 2 Puffs by mouth every day. 1 Inhaler 11   • ondansetron (ZOFRAN) 4 MG Tab tablet      • cyanocobalamin (VITAMIN B-12) 500 MCG Tab Take 500 mcg by mouth every day.     • Multiple Vitamins-Minerals (CENTRUM SILVER PO) Take  by mouth.     • Cholecalciferol (VITAMIN D3) 400 UNITS CAPS Take 1 Cap by mouth every day.     • loratadine (CLARITIN) 10 MG TABS Take 10 mg by mouth every day. Indications: Hayfever     • potassium chloride ER (KLOR-CON) 10 MEQ tablet TAKE ONE TABLET EVERY DAY AS NEEDED 30 Tab 6   • nitroglycerin (NITROSTAT) 0.4 MG SL Tab Place 1 Tab under tongue as needed for Chest Pain. 25 Tab 0   • loperamide (IMODIUM) 2 MG Cap Take 2 mg by mouth 4 times a day as needed for Diarrhea.     • lidocaine-prilocaine (EMLA) 2.5-2.5 % Cream APPLY A THICK FILM OVER THE PORT ACCESS SITE PRIOR TO ACCESS 30 g 2   • albuterol (PROAIR HFA) 108 (90 Base) MCG/ACT Aero Soln inhalation aerosol Inhale 2 Puffs by mouth every four hours as needed for Shortness of Breath (wheezing). 1 Inhaler 11   • furosemide (LASIX) 40 MG Tab Take 1 Tab by mouth every day. 30 Tab 11     Current Facility-Administered Medications   Medication Dose Route  Frequency Provider Last Rate Last Dose   • heparin pf injection 500 Units  500 Units Intracatheter PRN Eric Hardy M.D.   500 Units at 03/09/18 1542   • heparin pf injection 500 Units  500 Units Intracatheter PRN ARTURO Krishnan   500 Units at 08/22/17 0856         Social History:     Social History     Social History   • Marital status:      Spouse name: N/A   • Number of children: N/A   • Years of education: N/A     Occupational History   • Not on file.     Social History Main Topics   • Smoking status: Never Smoker   • Smokeless tobacco: Never Used   • Alcohol use No      Comment: rare   • Drug use: No   • Sexual activity: No     Other Topics Concern   • Not on file     Social History Narrative   • No narrative on file       Family History:      Family History   Problem Relation Age of Onset   • Heart Disease Mother    • Heart Failure Mother    • Hypertension Mother    • Hyperlipidemia Mother    • Cancer Mother    • Cancer Maternal Aunt 60        breast ca   • Lung Disease Brother         COPD/Emphysema/Smoker   • Cancer Brother         prostate cancer   • Alcohol/Drug Brother         Alcohol   • Kidney Disease Father         renal failure       Review of Systems:  All other review of systems are negative except what was mentioned above in the HPI.    Constitutional: Negative for fever, chills, weight loss and malaise/fatigue.    HEENT: No new auditory or visual complaints. No sore throat and neck pain.     Respiratory: Negative for cough, sputum production, shortness of breath and wheezing.    Cardiovascular: Negative for chest pain, palpitations, orthopnea and leg swelling.    Gastrointestinal: Negative for heartburn, nausea, vomiting and abdominal pain.    Genitourinary: Negative for dysuria, hematuria    Musculoskeletal: No new arthralgias or myalgias   Skin: Negative for rash and itching.    Neurological: Negative for focal weakness and headaches.    Endo/Heme/Allergies: No abnormal  "bleed/bruise.    Psychiatric/Behavioral: No new depression/anxiety.    Physical Exam:  Vitals: /98   Pulse 98   Temp 37 °C (98.6 °F)   Resp 18   Ht 1.651 m (5' 5\")   Wt 77.7 kg (171 lb 6.5 oz)   SpO2 96%   BMI 28.52 kg/m²     General: Not in acute distress, alert and oriented x 3  HEENT: No pallor, icterus. Oropharynx clear.   Neck: Supple, no palpable masses.  Lymph nodes: No palpable cervical, supraclavicular, axillary or inguinal lymphadenopathy.    CVS: regular rate and rhythm, no rubs or gallops  RESP: Clear to auscultate bilaterally, no wheezing or crackles.   ABD: Soft, non tender, non distended, positive bowel sounds, no palpable organomegaly  EXT: No edema or cyanosis  CNS: Alert and oriented x3, No focal deficits.  Skin- No rash      Labs:   Outpatient Infusion Services on 09/04/2018   Component Date Value Ref Range Status   • WBC 09/04/2018 3.6* 4.8 - 10.8 K/uL Final   • RBC 09/04/2018 4.47  4.20 - 5.40 M/uL Final   • Hemoglobin 09/04/2018 12.4  12.0 - 16.0 g/dL Final   • Hematocrit 09/04/2018 39.0  37.0 - 47.0 % Final   • MCV 09/04/2018 87.2  81.4 - 97.8 fL Final   • MCH 09/04/2018 27.7  27.0 - 33.0 pg Final   • MCHC 09/04/2018 31.8* 33.6 - 35.0 g/dL Final   • RDW 09/04/2018 64.6* 35.9 - 50.0 fL Final   • Platelet Count 09/04/2018 192  164 - 446 K/uL Final   • MPV 09/04/2018 10.6  9.0 - 12.9 fL Final   • Neutrophils-Polys 09/04/2018 55.60  44.00 - 72.00 % Final   • Lymphocytes 09/04/2018 26.20  22.00 - 41.00 % Final   • Monocytes 09/04/2018 12.70  0.00 - 13.40 % Final   • Eosinophils 09/04/2018 4.10  0.00 - 6.90 % Final   • Basophils 09/04/2018 1.10  0.00 - 1.80 % Final   • Immature Granulocytes 09/04/2018 0.30  0.00 - 0.90 % Final   • Nucleated RBC 09/04/2018 0.00  /100 WBC Final   • Neutrophils (Absolute) 09/04/2018 2.02  2.00 - 7.15 K/uL Final    Includes immature neutrophils, if present.   • Lymphs (Absolute) 09/04/2018 0.95* 1.00 - 4.80 K/uL Final   • Monos (Absolute) 09/04/2018 0.46  " 0.00 - 0.85 K/uL Final   • Eos (Absolute) 09/04/2018 0.15  0.00 - 0.51 K/uL Final   • Baso (Absolute) 09/04/2018 0.04  0.00 - 0.12 K/uL Final   • Immature Granulocytes (abs) 09/04/2018 0.01  0.00 - 0.11 K/uL Final   • NRBC (Absolute) 09/04/2018 0.00  K/uL Final             Assessment and Plan:  Metastatic rectal cancer, KRAS mutated: She had a rather indolent course, but had isolated relapse in the liver. She is status post Y90 theraspheres 6/2017 with normalization of her CEA level. Posttreatment images are consistent with treated metastatic lesion. Sheresumed  the 5-FU infusion after a short break.  Her CEA has trended up recently. CT scan of the chest done around 8 weeks ago and most recent CT of the abdomen and pelvis does not reveal any new areas of disease.  . She has a follow-up appointment with Dr. West to address any local management options. May be worthwhile getting a PET scan prior to doing any ablation or resection.  Will see her back after her surgical appointment. If her CEA trends up about 10. We will consider repeat PET scan  If there is convincing evidence of progression. No other sites, will consider adding Avastin to her regimen.  ? 5-JD-jpqcwqa vasospasm- this is improved after a few doses of 5-FU.     Exertional dyspnea-6/2018 CT of the chest, grossly unremarkable. She feels better with inhalers. She will follow up with pulmonary. Agree with exercise recommendation.     3 History of pulmonary embolus: He was diagnosed in 8/2015 and was initially treated with Coumadin then changed to Xarelto. She had completed 6 months of therapy but was continued on anticoagulation secondary to underlying malignancy.. She did have significant clot burden and pulmonary infarction at the time of presentation.. She  had some bleeding around the stoma and she is currently on dose reduced Eliquis  2.5 mg twice a day.     4 Anal repair and reconstruction: Patient was seen by Dr. Montgomery and has had multiple  surgeries to this area. Further surgery has been deferred. She continues to follow-up with Dr. Montgomery periodically       She agreed and verbalized  agreement and understanding with the current plan.  I answered all questions and concerns at this time         Please note that this dictation was created using voice recognition software. I have made every reasonable attempt to correct obvious errors, but I expect that there are errors of grammar and possibly content that I did not discover before finalizing the note.      SIGNATURES:  Eric Hardy    CC:  Manan Quiñonez M.D.  No ref. provider found

## 2018-09-13 ENCOUNTER — APPOINTMENT (OUTPATIENT)
Dept: HEMATOLOGY ONCOLOGY | Facility: MEDICAL CENTER | Age: 78
End: 2018-09-13
Payer: MEDICARE

## 2018-09-17 NOTE — PROGRESS NOTES
"Pharmacy Chemotherapy Verification Note:    Patient Name: Karlene Walters      Dx: Colon CA        Protocol: 5-FU + Leucovorin      *Dosing Reference*   IV over 2 hours on Day 1, followed by    IVP on Day 1, followed by         -- 10/31/17: delete Leucovorin and 5-FU push d/t neutropenia per Dr. Hardy   Fluorouracil   IV continuous infusion daily on Days 1 and 2 ( IV over 46-48 hours)        -- 20% reduction (1920 mg/m²) to be continued starting C28 per C Alsop APRN   14 day cycles for 12 cycles (adjuvant) or until disease progression or unacceptable toxicity  NCCN Guidelines for Colon Cancer. V.2.2015.  Jay T, et al. Eur J Cancer.1999;35(9):1343-7.      Allergies:  Codeine; Oxaliplatin; Pcn; Sulfa drugs; and Tape     /72   Pulse 100   Temp 36.8 °C (98.2 °F)   Resp 18   Ht 1.651 m (5' 5\")   Wt 78.1 kg (172 lb 2.9 oz)   SpO2 99%   BMI 28.65 kg/m²  Body surface area is 1.89 meters squared.     Labs 9/18/18  ANC~ 2490 Plt = 179k   Hgb = 11.2     SCr = 0.92 mg/dL CrCl ~ 61.7 mL/min   LFT's = 27/24/143 TBili = 0.7     Drug Order   (Drug name, dose, route, IV Fluid & volume, frequency, number of doses) Cycle 68 (C60 in [facility]ty)  Previous treatment: C67 = 9/4/18     Medication = Fluorouracil (5-FU)   Base Dose = 1920 mg/m²  Calc Dose:Base Dose x 1.89 m² = 3628.8 mg  Final Dose = 3630 mg   Route = CIVI  Drug Conc = 50 mg/mL  Fluid & Volume = 72.6 mL (+ 3 mL overfill = 75.6 mL)  Admin Duration = CIVI over 46 hrs @ 1.6 mL/hr   Via CADD pump for home infusion        <5% difference, ok to treat with final dose     By my signature below, I confirm this process was performed independently with the BSA and all final chemotherapy dosing calculations congruent. I have reviewed the above chemotherapy order and that my calculation of the final dose and BSA (when applicable) corroborate those calculations of the  pharmacist.       Demetrius Maldonado, PharmD  "

## 2018-09-18 ENCOUNTER — OUTPATIENT INFUSION SERVICES (OUTPATIENT)
Dept: ONCOLOGY | Facility: MEDICAL CENTER | Age: 78
End: 2018-09-18
Attending: INTERNAL MEDICINE
Payer: MEDICARE

## 2018-09-18 ENCOUNTER — TELEPHONE (OUTPATIENT)
Dept: HEMATOLOGY ONCOLOGY | Facility: MEDICAL CENTER | Age: 78
End: 2018-09-18

## 2018-09-18 VITALS
OXYGEN SATURATION: 99 % | HEIGHT: 65 IN | TEMPERATURE: 98.2 F | SYSTOLIC BLOOD PRESSURE: 149 MMHG | WEIGHT: 172.18 LBS | BODY MASS INDEX: 28.69 KG/M2 | DIASTOLIC BLOOD PRESSURE: 72 MMHG | HEART RATE: 100 BPM | RESPIRATION RATE: 18 BRPM

## 2018-09-18 DIAGNOSIS — C20 RECTAL CANCER (HCC): ICD-10-CM

## 2018-09-18 DIAGNOSIS — R94.4 DECREASED GFR: ICD-10-CM

## 2018-09-18 LAB
ALBUMIN SERPL BCP-MCNC: 3.3 G/DL (ref 3.2–4.9)
ALBUMIN/GLOB SERPL: 1.3 G/DL
ALP SERPL-CCNC: 143 U/L (ref 30–99)
ALT SERPL-CCNC: 24 U/L (ref 2–50)
ANION GAP SERPL CALC-SCNC: 11 MMOL/L (ref 0–11.9)
ANISOCYTOSIS BLD QL SMEAR: ABNORMAL
AST SERPL-CCNC: 27 U/L (ref 12–45)
BASOPHILS # BLD AUTO: 1.2 % (ref 0–1.8)
BASOPHILS # BLD: 0.05 K/UL (ref 0–0.12)
BILIRUB SERPL-MCNC: 0.7 MG/DL (ref 0.1–1.5)
BUN SERPL-MCNC: 14 MG/DL (ref 8–22)
CALCIUM SERPL-MCNC: 8.7 MG/DL (ref 8.5–10.5)
CEA SERPL-MCNC: 8.2 NG/ML (ref 0–3)
CHLORIDE SERPL-SCNC: 110 MMOL/L (ref 96–112)
CO2 SERPL-SCNC: 20 MMOL/L (ref 20–33)
COMMENT 1642: NORMAL
CREAT SERPL-MCNC: 0.92 MG/DL (ref 0.5–1.4)
EOSINOPHIL # BLD AUTO: 0.15 K/UL (ref 0–0.51)
EOSINOPHIL NFR BLD: 3.5 % (ref 0–6.9)
ERYTHROCYTE [DISTWIDTH] IN BLOOD BY AUTOMATED COUNT: 65.1 FL (ref 35.9–50)
GLOBULIN SER CALC-MCNC: 2.5 G/DL (ref 1.9–3.5)
GLUCOSE SERPL-MCNC: 107 MG/DL (ref 65–99)
HCT VFR BLD AUTO: 36.6 % (ref 37–47)
HGB BLD-MCNC: 11.2 G/DL (ref 12–16)
IMM GRANULOCYTES # BLD AUTO: 0.02 K/UL (ref 0–0.11)
IMM GRANULOCYTES NFR BLD AUTO: 0.5 % (ref 0–0.9)
LYMPHOCYTES # BLD AUTO: 1.01 K/UL (ref 1–4.8)
LYMPHOCYTES NFR BLD: 23.4 % (ref 22–41)
MCH RBC QN AUTO: 27.2 PG (ref 27–33)
MCHC RBC AUTO-ENTMCNC: 30.6 G/DL (ref 33.6–35)
MCV RBC AUTO: 88.8 FL (ref 81.4–97.8)
MONOCYTES # BLD AUTO: 0.6 K/UL (ref 0–0.85)
MONOCYTES NFR BLD AUTO: 13.9 % (ref 0–13.4)
MORPHOLOGY BLD-IMP: NORMAL
NEUTROPHILS # BLD AUTO: 2.49 K/UL (ref 2–7.15)
NEUTROPHILS NFR BLD: 57.5 % (ref 44–72)
NRBC # BLD AUTO: 0 K/UL
NRBC BLD-RTO: 0 /100 WBC
OVALOCYTES BLD QL SMEAR: NORMAL
PLATELET # BLD AUTO: 179 K/UL (ref 164–446)
PLATELET BLD QL SMEAR: NORMAL
PMV BLD AUTO: 9.7 FL (ref 9–12.9)
POLYCHROMASIA BLD QL SMEAR: NORMAL
POTASSIUM SERPL-SCNC: 3.6 MMOL/L (ref 3.6–5.5)
PROT SERPL-MCNC: 5.8 G/DL (ref 6–8.2)
RBC # BLD AUTO: 4.12 M/UL (ref 4.2–5.4)
RBC BLD AUTO: PRESENT
SODIUM SERPL-SCNC: 141 MMOL/L (ref 135–145)
WBC # BLD AUTO: 4.3 K/UL (ref 4.8–10.8)

## 2018-09-18 PROCEDURE — 96374 THER/PROPH/DIAG INJ IV PUSH: CPT | Mod: XU

## 2018-09-18 PROCEDURE — G0498 CHEMO EXTEND IV INFUS W/PUMP: HCPCS

## 2018-09-18 PROCEDURE — A4212 NON CORING NEEDLE OR STYLET: HCPCS

## 2018-09-18 PROCEDURE — 82378 CARCINOEMBRYONIC ANTIGEN: CPT

## 2018-09-18 PROCEDURE — 80053 COMPREHEN METABOLIC PANEL: CPT

## 2018-09-18 PROCEDURE — 700111 HCHG RX REV CODE 636 W/ 250 OVERRIDE (IP): Performed by: INTERNAL MEDICINE

## 2018-09-18 PROCEDURE — 85025 COMPLETE CBC W/AUTO DIFF WBC: CPT

## 2018-09-18 RX ORDER — DEXAMETHASONE SODIUM PHOSPHATE 4 MG/ML
8 INJECTION, SOLUTION INTRA-ARTICULAR; INTRALESIONAL; INTRAMUSCULAR; INTRAVENOUS; SOFT TISSUE ONCE
Status: COMPLETED | OUTPATIENT
Start: 2018-09-18 | End: 2018-09-18

## 2018-09-18 RX ADMIN — FLUOROURACIL 3630 MG: 50 INJECTION, SOLUTION INTRAVENOUS at 12:33

## 2018-09-18 RX ADMIN — DEXAMETHASONE SODIUM PHOSPHATE 8 MG: 4 INJECTION, SOLUTION INTRAMUSCULAR; INTRAVENOUS at 11:31

## 2018-09-18 ASSESSMENT — PAIN SCALES - GENERAL: PAINLEVEL_OUTOF10: 0

## 2018-09-18 NOTE — PROGRESS NOTES
"Pharmacy Chemotherapy Verification Note:    Patient Name: Karlene Walters      Dx: Colon CA        Cycle 68 (Stratton cycle 60)   Previous treatment: C67 = 09/04/28    Protocol: 5-FU+Leucovorin       IV over 2 hours on Day 1, followed by   IVP on Day 1, followed by    - Dose reduced by 20% to 320 mg/m² starting with Cycle 18 (Stratton #11) on 7/5/16 due to neutropenia    10/31/17: delete Leucovorin and 5-FU push d/t neutropenia per Dr. Hardy   Fluorouracil 1200 mg/m²  IV continuous infusion daily on Days 1 and 2 (2400 mg/m² IV over 46-48 hours) -    - Infusion Dose reduced by 20% starting with Cycle 18 (Stratton #11) on 7/5/16 due to neutropenia   -20% reduction (1920 mg/m2) to be continued starting C28 per C Alsop APRN   14 day cycles for 12 cycles (adjuvant) or until disease progression or unacceptable toxicity  NCCN Guidelines for Colon Cancer. V.2.2015.  Jay T, et al. Eur J Cancer.1999;35(9):1343-7.      Allergies:  Codeine; Oxaliplatin; Pcn; Sulfa drugs; and Tape     /72   Pulse 100   Temp 36.8 °C (98.2 °F)   Resp 18   Ht 1.651 m (5' 5\")   Wt 78.1 kg (172 lb 2.9 oz)   SpO2 99%   BMI 28.65 kg/m²    Body surface area is 1.89 meters squared.     Labs 09/18/18  ANC~ 2500 Plt = 179k   Hgb = 11.2   SCr = 0.92 mg/dL    ~CrCl = 62 mL/min  LFTs = WNL except    K = 3.6   CMP on even cycles per TP       Fluorouracil (5-FU) 1920 mg/m² x 1.89m² = 3629 mg              <5% difference, OK to treat with final dose = 3630 mg    CIVI over 46 hours Via home infusion pump at 1.6ml/hr                   Lalit Andrade, PharmD, BCOP    "

## 2018-09-18 NOTE — TELEPHONE ENCOUNTER
Patient had stop by our offices and stated that Dr ROJAS didn't see any changes in her scan this week. Also would like to know if she still need to come in on Tuesday. 09/25/18 she would like to know if we can move her to the end of the month for her visit her at [facility].

## 2018-09-18 NOTE — PROGRESS NOTES
Chemotherapy Verification - SECONDARY RN       Height = 65 in  Weight = 78.1 kg  BSA = 1.89 m2       Medication: 5 FU pump  Dose: 1920 mg/m2 over 46 hours  Calculated Dose: 3628 mg over 46 hours                             (In mg/m2, AUC, mg/kg)     I confirm that this process was performed independently.

## 2018-09-18 NOTE — PROGRESS NOTES
Pt connected to CADD pump for 46 hr CIVI of 5FU.  Medication, pump, pump settings and continued + brisk blood return from port verified by RN's x 2 prior to pump connect.  Pt left infusion services in no apparent distress after completion of treatment, ambulatory and accompanied by spouse.  Pt to return Thursday, 09/20/18, for pump disconnect.  Appointment scheduled and confirmed.

## 2018-09-18 NOTE — PROGRESS NOTES
Chemotherapy Verification - PRIMARY RN      Height = 65 inches  Weight = 78.1 kg  BSA = 1.89 m2       Medication: Fluorouracil (Adrucil)  Dose: 1920 mg/m2  Calculated Dose: 3628.8 mg via CADD pump for CIVI for 46 hrs                             (In mg/m2, AUC, mg/kg)         I confirm this process was performed independently with the BSA and all final chemotherapy dosing calculations congruent.  Any discrepancies of 5% or greater have been addressed with the chemotherapy pharmacist. The resolution of the discrepancy has been documented in the EPIC progress notes.

## 2018-09-18 NOTE — PROGRESS NOTES
Pt to infusion services ambulatory per self.  Pt here for scheduled chemotherapy, day 1, cycle 60 of 5FU via CIVI over 46 hrs.  Plan of care reviewed.  Pt verbalizes understanding.  Pt reports no issues or concerns.  Pt with left chest port with EMLA cream in place.  Port site cleaned and port accessed in sterile fashion.  Port flushes easily; + brisk blood return verified.  Labs drawn per treatment plan.  Blood samples sent to lab.     Lab results available, reviewed, and within parameters established by MD for treatment today.  Pt updated to plan and verbalizes understanding.  Pre medications administered per MD orders.  Pt resting in chair.  Call light at hand.

## 2018-09-20 ENCOUNTER — OUTPATIENT INFUSION SERVICES (OUTPATIENT)
Dept: ONCOLOGY | Facility: MEDICAL CENTER | Age: 78
End: 2018-09-20
Attending: INTERNAL MEDICINE
Payer: MEDICARE

## 2018-09-20 VITALS
WEIGHT: 172.62 LBS | HEART RATE: 96 BPM | DIASTOLIC BLOOD PRESSURE: 69 MMHG | RESPIRATION RATE: 18 BRPM | HEIGHT: 65 IN | BODY MASS INDEX: 28.76 KG/M2 | SYSTOLIC BLOOD PRESSURE: 124 MMHG | TEMPERATURE: 97.5 F | OXYGEN SATURATION: 99 %

## 2018-09-20 DIAGNOSIS — C20 RECTAL CANCER (HCC): ICD-10-CM

## 2018-09-20 DIAGNOSIS — R94.4 DECREASED GFR: ICD-10-CM

## 2018-09-20 PROCEDURE — 700111 HCHG RX REV CODE 636 W/ 250 OVERRIDE (IP): Performed by: INTERNAL MEDICINE

## 2018-09-20 PROCEDURE — 96523 IRRIG DRUG DELIVERY DEVICE: CPT

## 2018-09-20 RX ADMIN — HEPARIN 500 UNITS: 100 SYRINGE at 11:53

## 2018-09-20 ASSESSMENT — PAIN SCALES - GENERAL: PAINLEVEL_OUTOF10: 0

## 2018-09-20 NOTE — PROGRESS NOTES
Pt presents today for CADD pump disconncect. Pt offers no new complaints, states she ate [place] last night which upset her stomach, otherwise no complaints. CADD pump empty, port flushed positive blood return noted. Port deaccessed. Pt discharged home ambulatory stable, will return to clinic on 10/2

## 2018-09-21 ENCOUNTER — APPOINTMENT (RX ONLY)
Dept: URBAN - METROPOLITAN AREA CLINIC 4 | Facility: CLINIC | Age: 78
Setting detail: DERMATOLOGY
End: 2018-09-21

## 2018-09-21 DIAGNOSIS — L71.8 OTHER ROSACEA: ICD-10-CM

## 2018-09-21 PROBLEM — D48.5 NEOPLASM OF UNCERTAIN BEHAVIOR OF SKIN: Status: ACTIVE | Noted: 2018-09-21

## 2018-09-21 PROCEDURE — ? BIOPSY BY SHAVE METHOD

## 2018-09-21 PROCEDURE — 11100: CPT

## 2018-09-21 PROCEDURE — 99213 OFFICE O/P EST LOW 20 MIN: CPT | Mod: 25

## 2018-09-21 PROCEDURE — ? ADDITIONAL NOTES

## 2018-09-21 PROCEDURE — ? COUNSELING

## 2018-09-21 ASSESSMENT — LOCATION SIMPLE DESCRIPTION DERM: LOCATION SIMPLE: NOSE

## 2018-09-21 ASSESSMENT — LOCATION DETAILED DESCRIPTION DERM: LOCATION DETAILED: NASAL DORSUM

## 2018-09-21 ASSESSMENT — LOCATION ZONE DERM: LOCATION ZONE: NOSE

## 2018-09-21 NOTE — PROCEDURE: BIOPSY BY SHAVE METHOD
Billing Type: Third-Party Bill
Electrodesiccation And Curettage Text: The wound bed was treated with electrodesiccation and curettage after the biopsy was performed.
Render Post-Care Instructions In Note?: yes
Biopsy Type: H and E
Destruction After The Procedure: No
Additional Anesthesia Volume In Cc (Will Not Render If 0): 0
Silver Nitrate Text: The wound bed was treated with silver nitrate after the biopsy was performed.
Depth Of Biopsy: dermis
Curettage Text: The wound bed was treated with curettage after the biopsy was performed.
Wound Care: Bacitracin
Lab: [id number]
Notification Instructions: Patient will be notified of biopsy results. However, patient instructed to call the office if not contacted within 2 weeks.
Lab Facility: 
Dressing: bandage
Type Of Destruction Used: Curettage
Consent: Written consent was obtained and risks were reviewed including but not limited to scarring, infection, bleeding, scabbing, incomplete removal, nerve damage and allergy to anesthesia.
Detail Level: Detailed
Cryotherapy Text: The wound bed was treated with cryotherapy after the biopsy was performed.
Hemostasis: Drysol
Anesthesia Volume In Cc: 2
Biopsy Method: Personna blade
Electrodesiccation Text: The wound bed was treated with electrodesiccation after the biopsy was performed.
Post-Care Instructions: I reviewed with the patient in detail post-care instructions. Patient is to keep the biopsy site dry overnight, and then apply bacitracin twice daily until healed. Patient may apply hydrogen peroxide soaks to remove any crusting.
Anesthesia Type: 1% lidocaine with epinephrine

## 2018-09-24 NOTE — TELEPHONE ENCOUNTER
Patient is calling regarding infusion lab draw appointments which stated Reshma Espino, Medical Assistant was to schedule for her? Please advice

## 2018-09-24 NOTE — TELEPHONE ENCOUNTER
Patient called regarding upcoming appointments with our office and infusion, she stated she already has her lab draw appointment with infusion and also that she does not need appointment on 10/16/201 due to only seeing a provider at least once a month.

## 2018-09-26 NOTE — TELEPHONE ENCOUNTER
Patient called stated her prechemo appointment on 10/16/2018 is not necessary per her discussion with Dr. Hardy on last visit. I recommended to keep appointment and on 10/02/2018 she can speak with Dr. Hardy's medical assistant and change if possible. Patient agree.

## 2018-09-27 RX ORDER — ONDANSETRON 8 MG/1
8 TABLET, ORALLY DISINTEGRATING ORAL
Status: CANCELLED | OUTPATIENT
Start: 2018-10-02

## 2018-09-27 RX ORDER — LIDOCAINE HYDROCHLORIDE 10 MG/ML
10 INJECTION, SOLUTION INFILTRATION; PERINEURAL ONCE
Status: CANCELLED | OUTPATIENT
Start: 2018-10-02 | End: 2018-10-02

## 2018-09-27 RX ORDER — ONDANSETRON 2 MG/ML
4 INJECTION INTRAMUSCULAR; INTRAVENOUS
Status: CANCELLED | OUTPATIENT
Start: 2018-10-02

## 2018-09-27 RX ORDER — DEXTROSE MONOHYDRATE 50 MG/ML
INJECTION, SOLUTION INTRAVENOUS CONTINUOUS
Status: CANCELLED | OUTPATIENT
Start: 2018-10-02

## 2018-09-27 RX ORDER — DEXAMETHASONE SODIUM PHOSPHATE 4 MG/ML
8 INJECTION, SOLUTION INTRA-ARTICULAR; INTRALESIONAL; INTRAMUSCULAR; INTRAVENOUS; SOFT TISSUE ONCE
Status: CANCELLED | OUTPATIENT
Start: 2018-10-02 | End: 2018-10-03

## 2018-09-27 RX ORDER — PROCHLORPERAZINE MALEATE 10 MG
10 TABLET ORAL EVERY 6 HOURS PRN
Status: CANCELLED | OUTPATIENT
Start: 2018-10-02

## 2018-10-01 NOTE — PROGRESS NOTES
"Pharmacy Chemotherapy Verification Note:    Patient Name: Karlene Walters      Dx: Colon CA        Cycle 69 ([place] cycle 61)   Previous treatment: C68 = 09/18/28    Protocol: 5-FU+Leucovorin       IV over 2 hours on Day 1, followed by   IVP on Day 1, followed by    - Dose reduced by 20% to 320 mg/m² starting with Cycle 18 ([place] #11) on 7/5/16 due to neutropenia    10/31/17: delete Leucovorin and 5-FU push d/t neutropenia per Dr. Hardy   Fluorouracil 1200 mg/m²  IV continuous infusion daily on Days 1 and 2 (2400 mg/m² IV over 46-48 hours) -    - Infusion Dose reduced by 20% starting with Cycle 18 ([place] #11) on 7/5/16 due to neutropenia   -20% reduction (1920 mg/m2) to be continued starting C28 per C Alsop APRN   14 day cycles for 12 cycles (adjuvant) or until disease progression or unacceptable toxicity  NCCN Guidelines for Colon Cancer. V.2.2015.  Jay GROVER, et al. Eur J Cancer.1999;35(9):1343-7.      Allergies:  Codeine; Oxaliplatin; Pcn; Sulfa drugs; and Tape     Pulse 98   Temp 36.6 °C (97.8 °F)   Resp 18   Ht 1.651 m (5' 5\")   Wt 77.6 kg (171 lb 1.2 oz)   LMP  (LMP Unknown)   SpO2 98%   BMI 28.47 kg/m²    Body surface area is 1.89 meters squared.     Labs 10/2/18  ANC~ 2810 Plt = 196k   Hgb = 12.2       9/18/18: CMP on even cycles per Treatment Plan  SCr = 0.92 mg/dL    ~CrCl = 62 mL/min  LFTs = WNL except    K = 3.6      Fluorouracil (5-FU) 1920 mg/m² x 1.89 m² = 3629 mg              <5% difference, OK to treat with final dose = 3630 mg    CIVI over 46 hours Via home infusion pump at 1.6 ml/hr                 Norris Mitchell, PharmD    "

## 2018-10-02 ENCOUNTER — OUTPATIENT INFUSION SERVICES (OUTPATIENT)
Dept: ONCOLOGY | Facility: MEDICAL CENTER | Age: 78
End: 2018-10-02
Attending: INTERNAL MEDICINE
Payer: MEDICARE

## 2018-10-02 ENCOUNTER — OFFICE VISIT (OUTPATIENT)
Dept: HEMATOLOGY ONCOLOGY | Facility: MEDICAL CENTER | Age: 78
End: 2018-10-02
Payer: MEDICARE

## 2018-10-02 ENCOUNTER — TELEPHONE (OUTPATIENT)
Dept: HEMATOLOGY ONCOLOGY | Facility: MEDICAL CENTER | Age: 78
End: 2018-10-02

## 2018-10-02 VITALS
SYSTOLIC BLOOD PRESSURE: 124 MMHG | BODY MASS INDEX: 28.48 KG/M2 | DIASTOLIC BLOOD PRESSURE: 66 MMHG | HEART RATE: 97 BPM | TEMPERATURE: 97.9 F | WEIGHT: 170.97 LBS | HEIGHT: 65 IN | RESPIRATION RATE: 18 BRPM | OXYGEN SATURATION: 98 %

## 2018-10-02 VITALS
SYSTOLIC BLOOD PRESSURE: 132 MMHG | BODY MASS INDEX: 28.5 KG/M2 | WEIGHT: 171.08 LBS | RESPIRATION RATE: 18 BRPM | OXYGEN SATURATION: 98 % | TEMPERATURE: 97.8 F | DIASTOLIC BLOOD PRESSURE: 70 MMHG | HEART RATE: 98 BPM | HEIGHT: 65 IN

## 2018-10-02 VITALS
RESPIRATION RATE: 18 BRPM | TEMPERATURE: 97.8 F | HEIGHT: 65 IN | WEIGHT: 171.08 LBS | OXYGEN SATURATION: 98 % | BODY MASS INDEX: 28.5 KG/M2 | HEART RATE: 98 BPM

## 2018-10-02 DIAGNOSIS — C78.7 SECONDARY MALIGNANT NEOPLASM OF LIVER (HCC): ICD-10-CM

## 2018-10-02 DIAGNOSIS — C20 RECTAL CANCER (HCC): ICD-10-CM

## 2018-10-02 DIAGNOSIS — R94.4 DECREASED GFR: ICD-10-CM

## 2018-10-02 LAB
ANISOCYTOSIS BLD QL SMEAR: ABNORMAL
BASOPHILS # BLD AUTO: 0.9 % (ref 0–1.8)
BASOPHILS # BLD: 0.04 K/UL (ref 0–0.12)
COMMENT 1642: NORMAL
EOSINOPHIL # BLD AUTO: 0.16 K/UL (ref 0–0.51)
EOSINOPHIL NFR BLD: 3.4 % (ref 0–6.9)
ERYTHROCYTE [DISTWIDTH] IN BLOOD BY AUTOMATED COUNT: 65.2 FL (ref 35.9–50)
HCT VFR BLD AUTO: 39.2 % (ref 37–47)
HGB BLD-MCNC: 12.2 G/DL (ref 12–16)
IMM GRANULOCYTES # BLD AUTO: 0.02 K/UL (ref 0–0.11)
IMM GRANULOCYTES NFR BLD AUTO: 0.4 % (ref 0–0.9)
LYMPHOCYTES # BLD AUTO: 0.93 K/UL (ref 1–4.8)
LYMPHOCYTES NFR BLD: 20 % (ref 22–41)
MCH RBC QN AUTO: 27.5 PG (ref 27–33)
MCHC RBC AUTO-ENTMCNC: 31.1 G/DL (ref 33.6–35)
MCV RBC AUTO: 88.3 FL (ref 81.4–97.8)
MONOCYTES # BLD AUTO: 0.7 K/UL (ref 0–0.85)
MONOCYTES NFR BLD AUTO: 15 % (ref 0–13.4)
MORPHOLOGY BLD-IMP: NORMAL
NEUTROPHILS # BLD AUTO: 2.81 K/UL (ref 2–7.15)
NEUTROPHILS NFR BLD: 60.3 % (ref 44–72)
NRBC # BLD AUTO: 0 K/UL
NRBC BLD-RTO: 0 /100 WBC
OVALOCYTES BLD QL SMEAR: NORMAL
PLATELET # BLD AUTO: 196 K/UL (ref 164–446)
PLATELET BLD QL SMEAR: NORMAL
PMV BLD AUTO: 10.1 FL (ref 9–12.9)
POLYCHROMASIA BLD QL SMEAR: NORMAL
RBC # BLD AUTO: 4.44 M/UL (ref 4.2–5.4)
RBC BLD AUTO: PRESENT
WBC # BLD AUTO: 4.7 K/UL (ref 4.8–10.8)

## 2018-10-02 PROCEDURE — 85025 COMPLETE CBC W/AUTO DIFF WBC: CPT

## 2018-10-02 PROCEDURE — 36591 DRAW BLOOD OFF VENOUS DEVICE: CPT

## 2018-10-02 PROCEDURE — 99213 OFFICE O/P EST LOW 20 MIN: CPT | Performed by: NURSE PRACTITIONER

## 2018-10-02 PROCEDURE — 96374 THER/PROPH/DIAG INJ IV PUSH: CPT | Mod: XU

## 2018-10-02 PROCEDURE — A4212 NON CORING NEEDLE OR STYLET: HCPCS

## 2018-10-02 PROCEDURE — 96375 TX/PRO/DX INJ NEW DRUG ADDON: CPT

## 2018-10-02 PROCEDURE — 700111 HCHG RX REV CODE 636 W/ 250 OVERRIDE (IP): Performed by: NURSE PRACTITIONER

## 2018-10-02 PROCEDURE — G0498 CHEMO EXTEND IV INFUS W/PUMP: HCPCS

## 2018-10-02 RX ORDER — DEXAMETHASONE SODIUM PHOSPHATE 4 MG/ML
8 INJECTION, SOLUTION INTRA-ARTICULAR; INTRALESIONAL; INTRAMUSCULAR; INTRAVENOUS; SOFT TISSUE ONCE
Status: COMPLETED | OUTPATIENT
Start: 2018-10-02 | End: 2018-10-02

## 2018-10-02 RX ADMIN — FLUOROURACIL 3630 MG: 50 INJECTION, SOLUTION INTRAVENOUS at 10:30

## 2018-10-02 RX ADMIN — HEPARIN 500 UNITS: 100 SYRINGE at 08:40

## 2018-10-02 RX ADMIN — DEXAMETHASONE SODIUM PHOSPHATE 8 MG: 4 INJECTION, SOLUTION INTRAMUSCULAR; INTRAVENOUS at 10:00

## 2018-10-02 ASSESSMENT — ENCOUNTER SYMPTOMS
WHEEZING: 0
HEADACHES: 0
HEMOPTYSIS: 0
CONSTIPATION: 0
VOMITING: 0
CHILLS: 0
COUGH: 1
FEVER: 0
SPUTUM PRODUCTION: 1
PALPITATIONS: 0
SHORTNESS OF BREATH: 1
DIZZINESS: 0
DIARRHEA: 0
NAUSEA: 0
WEIGHT LOSS: 0

## 2018-10-02 ASSESSMENT — PAIN SCALES - GENERAL
PAINLEVEL: NO PAIN
PAINLEVEL_OUTOF10: 0

## 2018-10-02 NOTE — PROGRESS NOTES
Chemotherapy Verification - SECONDARY RN       Height = 165.1 cm  Weight = 77.6 kg  BSA = 1.886 m2       Medication: Home infusion  Dose: 1920 mg/m2 dr  Calculated Dose: 3622 mg                             (In mg/m2, AUC, mg/kg)       I confirm that this process was performed independently.

## 2018-10-02 NOTE — PROGRESS NOTES
Pt ambulatory w/  to [facility] [place] for C61 CADD pump hook-up.  Pt w/ no s/s of infection, pt has no complaints at this time.  Pt had labs drawn earlier today and results w/n parameters for tx today.  Decadron administered by IVP w/ no adverse reactions.  CADD pump connected to PORT, all clamps opened, all pump settings confirmed by 2 RNS, pump confirmed to be in RUN and placed in pt's konrad pack.  Pt left on foot in NAD w/ .  Confirmed pt's appt for 10-4 for pump disconnect.

## 2018-10-02 NOTE — PROGRESS NOTES
Subjective:      Karlene Walters is a 78 y.o. female who presents for Follow-Up ([facility] ([facility] for metastatic rectal cancer.         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  She presents accompanied by her  for today's visit.  Patient is scheduled to receive Cycle 61 of single agent 5FU.     Patient is doing well and tolerating her treatment well.  She denies any fevers, chills, fatigue or weight loss.  She is eating well with a good appetite.  She does still have a chronic dry cough but is not worsening.  She does produce sputum initially in the morning but none throughout the rest of the day.  She does experience some shortness of breath at times, more so when she does not use her inhalers regularly or with activity.  She denies any chest pain or heart palpitations.  She denies any nausea, vomiting, diarrhea or constipation.  Her stool output via ostomy is within normal limits per patient.  She is voiding without difficulty.  She denies any dizziness or headaches.    Approximately 11 days ago she was seen by dermatology, Lisseth Almonte for a lesion on the top of her left hand.  She stated it was itching at the time and patient underwent a biopsy.  According to the patient she did receive confirmation from the provider that it was positive and was told it was in his early stage.  Patient does not remember the type of cancer it was.  She is due to follow-up with the provider in a proximally 1 month on November 1, 2018 for further review and monitoring.  There is a scab noted on the back of her left hand from the procedure, which does look clean and dry.  We will attempt to get results from dermatology office.    Patient is due to establish care with cardio oncology in a proximally 1 month, she is seeing Dr. Kelly at that time.  She is working very hard at increasing her hydration daily and only taking Lasix with potassium as needed.  She stated she is taking her  "Lasix approximately once a week.  Her any functions do appear to have improved.    She did follow-up with Dr. West, surgeon, after her most recent CT scan as well as Dr. Hardy.  Recommendation to repeat CT scan in approximately 6 months per surgeon.    Allergies   Allergen Reactions   • Codeine      \"gets drunk\"   • Oxaliplatin Anaphylaxis   • Pcn [Penicillins] Itching   • Sulfa Drugs Itching   • Tape Rash     PAPER TAPE OK     Current Outpatient Prescriptions on File Prior to Visit   Medication Sig Dispense Refill   • raNITidine (ZANTAC) 150 MG Tab TAKE 1 TABLET 2 TIMES A DAY 60 Tab 3   • rivaroxaban (XARELTO) 10 MG Tab tablet Take 1 Tab by mouth every day. 30 Tab 6   • Tiotropium Bromide-Olodaterol (STIOLTO RESPIMAT) 2.5-2.5 MCG/ACT Aero Soln Take 2 Puffs by mouth every day. 1 Inhaler 11   • cyanocobalamin (VITAMIN B-12) 500 MCG Tab Take 500 mcg by mouth every day.     • Multiple Vitamins-Minerals (CENTRUM SILVER PO) Take  by mouth.     • Cholecalciferol (VITAMIN D3) 400 UNITS CAPS Take 1 Cap by mouth every day.     • loratadine (CLARITIN) 10 MG TABS Take 10 mg by mouth every day. Indications: Hayfever     • metronidazole (METROCREAM) 0.75 % cream Apply  to affected area(s) 2 times a day.     • potassium chloride ER (KLOR-CON) 10 MEQ tablet TAKE ONE TABLET EVERY DAY AS NEEDED 30 Tab 6   • nitroglycerin (NITROSTAT) 0.4 MG SL Tab Place 1 Tab under tongue as needed for Chest Pain. 25 Tab 0   • loperamide (IMODIUM) 2 MG Cap Take 2 mg by mouth 4 times a day as needed for Diarrhea.     • lidocaine-prilocaine (EMLA) 2.5-2.5 % Cream APPLY A THICK FILM OVER THE PORT ACCESS SITE PRIOR TO ACCESS 30 g 2   • albuterol (PROAIR HFA) 108 (90 Base) MCG/ACT Aero Soln inhalation aerosol Inhale 2 Puffs by mouth every four hours as needed for Shortness of Breath (wheezing). 1 Inhaler 11   • furosemide (LASIX) 40 MG Tab Take 1 Tab by mouth every day. 30 Tab 11   • ondansetron (ZOFRAN) 4 MG Tab tablet        Current " "Facility-Administered Medications on File Prior to Visit   Medication Dose Route Frequency Provider Last Rate Last Dose   • heparin pf injection 500 Units  500 Units Intracatheter PRN Ella De A.P.N.   500 Units at 10/02/18 0840       Review of Systems   Constitutional: Negative for chills, fever, malaise/fatigue and weight loss.   Respiratory: Positive for cough (dry cough still present - not worsening), sputum production (in the AM) and shortness of breath (sometimes and more so when she does not use her inhalers - more so with activity). Negative for hemoptysis and wheezing.    Cardiovascular: Negative for chest pain and palpitations.   Gastrointestinal: Negative for constipation, diarrhea, nausea and vomiting.   Genitourinary: Negative for dysuria.   Skin: Negative for itching and rash.        Left hand lesion biopsied. Lesion on skin around ostomy site from dressing adhesive - clean   Neurological: Negative for dizziness and headaches.          Objective:     /66 (BP Location: Right arm, Patient Position: Sitting, BP Cuff Size: Adult)   Pulse 97   Temp 36.6 °C (97.9 °F) (Temporal)   Resp 18   Ht 1.651 m (5' 5\")   Wt 77.6 kg (170 lb 15.5 oz)   LMP  (LMP Unknown)   SpO2 98%   Breastfeeding? Unknown   BMI 28.45 kg/m²      Physical Exam   Constitutional: She is oriented to person, place, and time. She appears well-developed and well-nourished. No distress.   HENT:   Head: Normocephalic and atraumatic.   Mouth/Throat: Oropharynx is clear and moist. No oropharyngeal exudate.   Cardiovascular: Normal rate, regular rhythm, normal heart sounds and intact distal pulses.  Exam reveals no gallop and no friction rub.    No murmur heard.  Pulmonary/Chest: Effort normal and breath sounds normal. No respiratory distress. She has no wheezes.   Abdominal: Soft. Bowel sounds are normal. She exhibits no distension. There is no tenderness.   Musculoskeletal: Normal range of motion. She exhibits no edema or " tenderness.   Neurological: She is alert and oriented to person, place, and time.   Skin: Skin is warm and dry. No rash noted. She is not diaphoretic. No erythema. No pallor.   Psychiatric: She has a normal mood and affect. Her behavior is normal.   Vitals reviewed.      Outpatient Infusion Services on 10/02/2018   Component Date Value Ref Range Status   • WBC 10/02/2018 4.7* 4.8 - 10.8 K/uL Final   • RBC 10/02/2018 4.44  4.20 - 5.40 M/uL Final   • Hemoglobin 10/02/2018 12.2  12.0 - 16.0 g/dL Final   • Hematocrit 10/02/2018 39.2  37.0 - 47.0 % Final   • MCV 10/02/2018 88.3  81.4 - 97.8 fL Final   • MCH 10/02/2018 27.5  27.0 - 33.0 pg Final   • MCHC 10/02/2018 31.1* 33.6 - 35.0 g/dL Final   • RDW 10/02/2018 65.2* 35.9 - 50.0 fL Final   • Platelet Count 10/02/2018 196  164 - 446 K/uL Final   • MPV 10/02/2018 10.1  9.0 - 12.9 fL Final   • Nucleated RBC 10/02/2018 0.00  /100 WBC Final   • NRBC (Absolute) 10/02/2018 0.00  K/uL Final   • Neutrophils-Polys 10/02/2018 60.30  44.00 - 72.00 % Final   • Lymphocytes 10/02/2018 20.00* 22.00 - 41.00 % Final   • Monocytes 10/02/2018 15.00* 0.00 - 13.40 % Final   • Eosinophils 10/02/2018 3.40  0.00 - 6.90 % Final   • Basophils 10/02/2018 0.90  0.00 - 1.80 % Final   • Immature Granulocytes 10/02/2018 0.40  0.00 - 0.90 % Final   • Neutrophils (Absolute) 10/02/2018 2.81  2.00 - 7.15 K/uL Final    Includes immature neutrophils, if present.   • Lymphs (Absolute) 10/02/2018 0.93* 1.00 - 4.80 K/uL Final   • Monos (Absolute) 10/02/2018 0.70  0.00 - 0.85 K/uL Final   • Eos (Absolute) 10/02/2018 0.16  0.00 - 0.51 K/uL Final   • Baso (Absolute) 10/02/2018 0.04  0.00 - 0.12 K/uL Final   • Immature Granulocytes (abs) 10/02/2018 0.02  0.00 - 0.11 K/uL Final   • Anisocytosis 10/02/2018 1+   Final   • Peripheral Smear Review 10/02/2018 see below   Final    Comment: Due to instrument suspect flags, further review of peripheral smear is  indicated on this patient sample. This review may or may not  result in  abnormal findings.     • Plt Estimation 10/02/2018 Normal   Final   • RBC Morphology 10/02/2018 Present   Final   • Polychromia 10/02/2018 1+   Final   • Ovalocytes 10/02/2018 1+   Final   • Comments-Diff 10/02/2018 see below   Final    Results have been verified by peripheral blood smear review.          Assessment/Plan:     1. Secondary malignant neoplasm of liver (HCC)     2. Rectal cancer (HCC)       Plan  1.  Patient scheduled for cycle #61 of 5-FU today.  Clinically she is stable and I did review her CBC and her labs are okay today to as well.  Patient is currently stable and able to proceed with treatment today as planned.    2.  Patient was seen by dermatology and had a biopsy of lesion on the top of her left hand.  According to the patient it did come back positive, and was told it was early stage.  We are attempting to retrieve records from dermatology office for better understanding.    3.  Patient is due to establish care with cardio oncology.  She will see him at the end of this month.    4.  Patient is also due to follow-up with pulmonology next month as well.  She will contact office for any refills on her inhalers as needed.    5.  Patient to follow-up in the clinic in approximately 1 month or sooner if needed.  She will continue on treatment every 2 weeks as scheduled.

## 2018-10-02 NOTE — PROGRESS NOTES
"Pharmacy Chemotherapy Verification Note:    Patient Name: Karlene Walters      Dx: Colon CA        Protocol: 5-FU + Leucovorin      *Dosing Reference*   IV over 2 hours on Day 1, followed by    IVP on Day 1, followed by         -- 10/31/17: delete Leucovorin and 5-FU push d/t neutropenia per Dr. Hardy   Fluorouracil   IV continuous infusion daily on Days 1 and 2 ( IV over 46-48 hours)        -- 20% reduction (1920 mg/m²) to be continued starting C28 per C Alsop APRN   14 day cycles for 12 cycles (adjuvant) or until disease progression or unacceptable toxicity  NCCN Guidelines for Colon Cancer. V.2.2015.  Jay T, et al. Eur J Cancer.1999;35(9):1343-7.      Allergies:  Codeine; Oxaliplatin; Pcn; Sulfa drugs; and Tape     Pulse 98   Temp 36.6 °C (97.8 °F)   Resp 18   Ht 1.651 m (5' 5\")   Wt 77.6 kg (171 lb 1.2 oz)   LMP  (LMP Unknown)   SpO2 98%   BMI 28.47 kg/m²  Body surface area is 1.89 meters squared.     Labs 10/2/18:  ANC~ 2810 Plt = 196k   Hgb = 12.2       Labs 9/18/18(CMP on even cycles per treatment plan:  SCr = 0.92 mg/dL CrCl ~ 61.7 mL/min   LFT's = 27/24/143 TBili = 0.7     Drug Order   (Drug name, dose, route, IV Fluid & volume, frequency, number of doses) Cycle 69 (C61 in [place])  Previous treatment: C68 = 9/18/18     Medication = Fluorouracil (5-FU)   Base Dose = 1920 mg/m²  Calc Dose:Base Dose x 1.89 m² = 3628.8 mg  Final Dose = 3630 mg   Route = CIVI  Drug Conc = 50 mg/mL  Fluid & Volume = 72.6 mL (+ 3 mL overfill = 75.6 mL)  Admin Duration = CIVI over 46 hrs @ 1.6 mL/hr   Via CADD pump for home infusion        <5% difference, ok to treat with final dose     By my signature below, I confirm this process was performed independently with the BSA and all final chemotherapy dosing calculations congruent. I have reviewed the above chemotherapy order and that my calculation of the final dose and BSA (when applicable) corroborate those calculations of the  pharmacist.       Sofiya SHEN" Mayela, PharmD, BCOP

## 2018-10-02 NOTE — PROGRESS NOTES
Chemotherapy Verification - PRIMARY RN      Height = 1.651 m  Weight = 77.6 kg  BSA = 1.89 m2       Medication: fluorouracil  Dose: 1920 mg/m2  Calculated Dose: 3628.8 mg                             (In mg/m2, AUC, mg/kg)       I confirm this process was performed independently with the BSA and all final chemotherapy dosing calculations congruent.  Any discrepancies of 5% or greater have been addressed with the chemotherapy pharmacist. The resolution of the discrepancy has been documented in the EPIC progress notes.

## 2018-10-02 NOTE — PROGRESS NOTES
Pt ambulatory to [facility] for pre-chemo labs.  Pt w/ no s/s of infection, pt has no complaints at this time.  EMLA cream in place over PORT site, wiped clean, accessed using sterile technique, brisk blood return noted, lab drawn per order, flushed and heparinized per protocol.  Access left in place for chemotherapy @ 1000.  Pt left on foot in NAD to see Genaro MORALES.  Confirmed pt's return time @ 1000 for chemo.

## 2018-10-03 ENCOUNTER — TELEPHONE (OUTPATIENT)
Dept: HEMATOLOGY ONCOLOGY | Facility: MEDICAL CENTER | Age: 78
End: 2018-10-03

## 2018-10-03 NOTE — TELEPHONE ENCOUNTER
I called to confirm with the patient regarding her DMV paperwork.Patient stated that I give her DMV paperwork yesterday 10/02/18. No questions needed.

## 2018-10-04 ENCOUNTER — OUTPATIENT INFUSION SERVICES (OUTPATIENT)
Dept: ONCOLOGY | Facility: MEDICAL CENTER | Age: 78
End: 2018-10-04
Attending: INTERNAL MEDICINE
Payer: MEDICARE

## 2018-10-04 VITALS
WEIGHT: 169.75 LBS | HEIGHT: 65 IN | HEART RATE: 102 BPM | OXYGEN SATURATION: 100 % | TEMPERATURE: 97.3 F | SYSTOLIC BLOOD PRESSURE: 118 MMHG | DIASTOLIC BLOOD PRESSURE: 59 MMHG | RESPIRATION RATE: 18 BRPM | BODY MASS INDEX: 28.28 KG/M2

## 2018-10-04 PROCEDURE — 700111 HCHG RX REV CODE 636 W/ 250 OVERRIDE (IP)

## 2018-10-04 PROCEDURE — 96523 IRRIG DRUG DELIVERY DEVICE: CPT

## 2018-10-04 RX ADMIN — HEPARIN 500 UNITS: 100 SYRINGE at 11:32

## 2018-10-04 ASSESSMENT — PAIN SCALES - GENERAL: PAINLEVEL_OUTOF10: 0

## 2018-10-04 NOTE — PROGRESS NOTES
Patient arrives ambulatory for home infusion pump disconnect.  Patient reports feeling well and denies any s/s of infection at this time.  Pump reads 0 mL in reservior, disconnected, cleaned, and returned to pharmacy.  Port flushed per protocol, brisk blood return noted, de accessed, gauze, and tape applied to site.  Patient to return 10/16/18, confirmed with patient.  Patient left ambulatory in no distress.

## 2018-10-11 RX ORDER — DEXAMETHASONE SODIUM PHOSPHATE 4 MG/ML
8 INJECTION, SOLUTION INTRA-ARTICULAR; INTRALESIONAL; INTRAMUSCULAR; INTRAVENOUS; SOFT TISSUE ONCE
Status: CANCELLED | OUTPATIENT
Start: 2018-10-16 | End: 2018-10-16

## 2018-10-11 RX ORDER — LIDOCAINE HYDROCHLORIDE 10 MG/ML
10 INJECTION, SOLUTION INFILTRATION; PERINEURAL ONCE
Status: CANCELLED | OUTPATIENT
Start: 2018-10-16 | End: 2018-10-16

## 2018-10-11 RX ORDER — ONDANSETRON 8 MG/1
8 TABLET, ORALLY DISINTEGRATING ORAL
Status: CANCELLED | OUTPATIENT
Start: 2018-10-16

## 2018-10-11 RX ORDER — DEXTROSE MONOHYDRATE 50 MG/ML
INJECTION, SOLUTION INTRAVENOUS CONTINUOUS
Status: CANCELLED | OUTPATIENT
Start: 2018-10-16

## 2018-10-11 RX ORDER — ONDANSETRON 2 MG/ML
4 INJECTION INTRAMUSCULAR; INTRAVENOUS
Status: CANCELLED | OUTPATIENT
Start: 2018-10-16

## 2018-10-11 RX ORDER — PROCHLORPERAZINE MALEATE 10 MG
10 TABLET ORAL EVERY 6 HOURS PRN
Status: CANCELLED | OUTPATIENT
Start: 2018-10-16

## 2018-10-15 NOTE — PROGRESS NOTES
"Pharmacy Chemotherapy Verification Note:    Patient Name: Karlene Walters      Dx: Colon CA        Protocol: 5-FU + Leucovorin      *Dosing Reference*   IV over 2 hours on Day 1, followed by    IVP on Day 1, followed by         -- 10/31/17: delete Leucovorin and 5-FU push d/t neutropenia per Dr. Hardy   Fluorouracil   IV continuous infusion daily on Days 1 and 2 ( IV over 46-48 hours)        -- 20% reduction (1920 mg/m²) to be continued starting C28 per C Alsop APRN   14 day cycles for 12 cycles (adjuvant) or until disease progression or unacceptable toxicity  NCCN Guidelines for Colon Cancer. V.2.2015.  Jay T, et al. Eur J Cancer.1999;35(9):1343-7.      Allergies:  Codeine; Oxaliplatin; Pcn; Sulfa drugs; and Tape     /75   Pulse 98   Temp 36.3 °C (97.3 °F)   Resp 18   Ht 1.651 m (5' 5\")   Wt 78.2 kg (172 lb 6.4 oz)   LMP  (LMP Unknown)   SpO2 100%   BMI 28.69 kg/m²  Body surface area is 1.89 meters squared.     ANC~ 2220 Plt = 199k   Hgb = 12.3     SCr = 1.06mg/dL CrCl ~ 54mL/min   LFT's = WNL, except AP = 161 TBili = 0.7       Drug Order   (Drug name, dose, route, IV Fluid & volume, frequency, number of doses) Cycle 70 (C62 in [place])  Previous treatment: C69 = 10/12/18     Medication = Fluorouracil (5-FU)   Base Dose = 1920 mg/m²  Calc Dose:Base Dose x 1.89m² = 3628.8mg  Final Dose = 3630mg   Route = CIVI  Drug Conc = 50 mg/mL  Fluid & Volume = 72.6mL (+ 3 mL overfill)  Admin Duration = CIVI over 46 hrs @ 1.6mL/hr   Via CADD pump for home infusion        <5% difference, ok to treat with final dose     By my signature below, I confirm this process was performed independently with the BSA and all final chemotherapy dosing calculations congruent. I have reviewed the above chemotherapy order and that my calculation of the final dose and BSA (when applicable) corroborate those calculations of the  pharmacist.       Nakia Arroyo, PharmD  "

## 2018-10-16 ENCOUNTER — OUTPATIENT INFUSION SERVICES (OUTPATIENT)
Dept: ONCOLOGY | Facility: MEDICAL CENTER | Age: 78
End: 2018-10-16
Attending: INTERNAL MEDICINE
Payer: MEDICARE

## 2018-10-16 VITALS
RESPIRATION RATE: 18 BRPM | WEIGHT: 172.4 LBS | HEART RATE: 98 BPM | TEMPERATURE: 97.3 F | SYSTOLIC BLOOD PRESSURE: 145 MMHG | HEIGHT: 65 IN | OXYGEN SATURATION: 100 % | DIASTOLIC BLOOD PRESSURE: 75 MMHG | BODY MASS INDEX: 28.72 KG/M2

## 2018-10-16 VITALS
HEART RATE: 98 BPM | HEIGHT: 65 IN | OXYGEN SATURATION: 100 % | DIASTOLIC BLOOD PRESSURE: 75 MMHG | BODY MASS INDEX: 28.72 KG/M2 | RESPIRATION RATE: 18 BRPM | SYSTOLIC BLOOD PRESSURE: 145 MMHG | TEMPERATURE: 97.3 F | WEIGHT: 172.4 LBS

## 2018-10-16 DIAGNOSIS — C20 RECTAL CANCER (HCC): ICD-10-CM

## 2018-10-16 DIAGNOSIS — R94.4 DECREASED GFR: ICD-10-CM

## 2018-10-16 DIAGNOSIS — C78.7 SECONDARY MALIGNANT NEOPLASM OF LIVER (HCC): ICD-10-CM

## 2018-10-16 LAB
ALBUMIN SERPL BCP-MCNC: 3.8 G/DL (ref 3.2–4.9)
ALBUMIN/GLOB SERPL: 1.4 G/DL
ALP SERPL-CCNC: 161 U/L (ref 30–99)
ALT SERPL-CCNC: 21 U/L (ref 2–50)
ANION GAP SERPL CALC-SCNC: 11 MMOL/L (ref 0–11.9)
ANISOCYTOSIS BLD QL SMEAR: ABNORMAL
AST SERPL-CCNC: 24 U/L (ref 12–45)
BASOPHILS # BLD AUTO: 1.2 % (ref 0–1.8)
BASOPHILS # BLD: 0.05 K/UL (ref 0–0.12)
BILIRUB SERPL-MCNC: 0.7 MG/DL (ref 0.1–1.5)
BUN SERPL-MCNC: 17 MG/DL (ref 8–22)
CALCIUM SERPL-MCNC: 9.6 MG/DL (ref 8.5–10.5)
CEA SERPL-MCNC: 13.8 NG/ML (ref 0–3)
CHLORIDE SERPL-SCNC: 109 MMOL/L (ref 96–112)
CO2 SERPL-SCNC: 22 MMOL/L (ref 20–33)
COMMENT 1642: NORMAL
CREAT SERPL-MCNC: 1.06 MG/DL (ref 0.5–1.4)
EOSINOPHIL # BLD AUTO: 0.18 K/UL (ref 0–0.51)
EOSINOPHIL NFR BLD: 4.3 % (ref 0–6.9)
ERYTHROCYTE [DISTWIDTH] IN BLOOD BY AUTOMATED COUNT: 65.1 FL (ref 35.9–50)
GLOBULIN SER CALC-MCNC: 2.7 G/DL (ref 1.9–3.5)
GLUCOSE SERPL-MCNC: 116 MG/DL (ref 65–99)
HCT VFR BLD AUTO: 39.6 % (ref 37–47)
HGB BLD-MCNC: 12.3 G/DL (ref 12–16)
IMM GRANULOCYTES # BLD AUTO: 0.01 K/UL (ref 0–0.11)
IMM GRANULOCYTES NFR BLD AUTO: 0.2 % (ref 0–0.9)
LG PLATELETS BLD QL SMEAR: NORMAL
LYMPHOCYTES # BLD AUTO: 1.11 K/UL (ref 1–4.8)
LYMPHOCYTES NFR BLD: 26.4 % (ref 22–41)
MCH RBC QN AUTO: 27.6 PG (ref 27–33)
MCHC RBC AUTO-ENTMCNC: 31.1 G/DL (ref 33.6–35)
MCV RBC AUTO: 89 FL (ref 81.4–97.8)
MONOCYTES # BLD AUTO: 0.64 K/UL (ref 0–0.85)
MONOCYTES NFR BLD AUTO: 15.2 % (ref 0–13.4)
MORPHOLOGY BLD-IMP: NORMAL
NEUTROPHILS # BLD AUTO: 2.22 K/UL (ref 2–7.15)
NEUTROPHILS NFR BLD: 52.7 % (ref 44–72)
NRBC # BLD AUTO: 0 K/UL
NRBC BLD-RTO: 0 /100 WBC
OVALOCYTES BLD QL SMEAR: NORMAL
PLATELET # BLD AUTO: 199 K/UL (ref 164–446)
PLATELET BLD QL SMEAR: NORMAL
PMV BLD AUTO: 10.3 FL (ref 9–12.9)
POIKILOCYTOSIS BLD QL SMEAR: NORMAL
POTASSIUM SERPL-SCNC: 3.6 MMOL/L (ref 3.6–5.5)
PROT SERPL-MCNC: 6.5 G/DL (ref 6–8.2)
RBC # BLD AUTO: 4.45 M/UL (ref 4.2–5.4)
RBC BLD AUTO: PRESENT
SODIUM SERPL-SCNC: 142 MMOL/L (ref 135–145)
WBC # BLD AUTO: 4.2 K/UL (ref 4.8–10.8)

## 2018-10-16 PROCEDURE — 700111 HCHG RX REV CODE 636 W/ 250 OVERRIDE (IP): Performed by: NURSE PRACTITIONER

## 2018-10-16 PROCEDURE — G0498 CHEMO EXTEND IV INFUS W/PUMP: HCPCS

## 2018-10-16 PROCEDURE — 96375 TX/PRO/DX INJ NEW DRUG ADDON: CPT

## 2018-10-16 PROCEDURE — A4212 NON CORING NEEDLE OR STYLET: HCPCS

## 2018-10-16 PROCEDURE — 96374 THER/PROPH/DIAG INJ IV PUSH: CPT | Mod: XU

## 2018-10-16 PROCEDURE — 82378 CARCINOEMBRYONIC ANTIGEN: CPT

## 2018-10-16 PROCEDURE — 80053 COMPREHEN METABOLIC PANEL: CPT

## 2018-10-16 PROCEDURE — 85025 COMPLETE CBC W/AUTO DIFF WBC: CPT

## 2018-10-16 RX ORDER — DEXAMETHASONE SODIUM PHOSPHATE 4 MG/ML
8 INJECTION, SOLUTION INTRA-ARTICULAR; INTRALESIONAL; INTRAMUSCULAR; INTRAVENOUS; SOFT TISSUE ONCE
Status: COMPLETED | OUTPATIENT
Start: 2018-10-16 | End: 2018-10-16

## 2018-10-16 RX ADMIN — DEXAMETHASONE SODIUM PHOSPHATE 8 MG: 4 INJECTION, SOLUTION INTRAMUSCULAR; INTRAVENOUS at 10:05

## 2018-10-16 RX ADMIN — FLUOROURACIL 3630 MG: 50 INJECTION, SOLUTION INTRAVENOUS at 10:40

## 2018-10-16 ASSESSMENT — PAIN SCALES - GENERAL
PAINLEVEL_OUTOF10: 0
PAINLEVEL_OUTOF10: 0

## 2018-10-16 NOTE — PROGRESS NOTES
At the request of Dr. Hardy, PET/CT has been ordered.  Request that she have a PET CT scheduled within the next 2 weeks.

## 2018-10-16 NOTE — PROGRESS NOTES
Chemotherapy Verification - PRIMARY RN      Height = 165.1 cm Weight = 78.2 kg  BSA = 1.89 m2       Medication: Fluorouracil  Dose: 1,920 mg/m2 over 46 hours  Calculated Dose: 3,628.8 mg over 46 hours                               I confirm this process was performed independently with the BSA and all final chemotherapy dosing calculations congruent.  Any discrepancies of 5% or greater have been addressed with the chemotherapy pharmacist. The resolution of the discrepancy has been documented in the EPIC progress notes.

## 2018-10-16 NOTE — PROGRESS NOTES
Chemotherapy Verification - SECONDARY RN       Height = 1.651 m  Weight = 78.2 kg  BSA = 1.89 m2       Medication: fluorouracil (CADD pump) Dose: 1920 mg/m2  Calculated Dose: 3628.8 mg                             (In mg/m2, AUC, mg/kg)       I confirm that this process was performed independently.

## 2018-10-16 NOTE — PROGRESS NOTES
Pt ambulated into department for Cycle 62/ Day 1 of her 5FU home infusion pump. Pt declined having any acute symptoms, or active S/S of infection. Port accessed previously in [facility], had brisk blood return. Pt given pre-medication as prescribed. Pump confirmed to be programed to run at 1.6 ml/hr over 46 hours with nurse B[place]. Primary RN confirmed that pump was running prior to Pt leaving and that clamps were open. Pt left department with  appearing in good spirits and NAD.

## 2018-10-16 NOTE — PROGRESS NOTES
"Pharmacy Chemotherapy Verification Note:    Patient Name: Karlene Walters      Dx: Colon CA        Cycle 70 ([place] cycle 62)   Previous treatment: C69 = 10/02/28    Protocol: 5-FU+Leucovorin       IV over 2 hours on Day 1, followed by   IVP on Day 1, followed by    - Dose reduced by 20% to 320 mg/m² starting with Cycle 18 ([place] #11) on 7/5/16 due to neutropenia    10/31/17: delete Leucovorin and 5-FU push d/t neutropenia per Dr. Hardy   Fluorouracil 1200 mg/m²  IV continuous infusion daily on Days 1 and 2 (2400 mg/m² IV over 46-48 hours) -    - Infusion Dose reduced by 20% starting with Cycle 18 ([place] #11) on 7/5/16 due to neutropenia   -20% reduction (1920 mg/m2) to be continued starting C28 per C Alsop APRN   14 day cycles for 12 cycles (adjuvant) or until disease progression or unacceptable toxicity  NCCN Guidelines for Colon Cancer. V.2.2015.  Jay GROVER, et al. Eur J Cancer.1999;35(9):1343-7.      Allergies:  Codeine; Oxaliplatin; Pcn; Sulfa drugs; and Tape       /75   Pulse 98   Temp 36.3 °C (97.3 °F)   Resp 18   Ht 1.651 m (5' 5\")   Wt 78.2 kg (172 lb 6.4 oz)   LMP  (LMP Unknown)   SpO2 100%   BMI 28.69 kg/m²  Body surface area is 1.89 meters squared.    Labs 10/16/18: CMP on even cycles  ANC~ 2000 Plt = 199k   Hgb = 12.3   SCr = 1.06 mg/dL    ~CrCl = 54 mL/min  LFTs = WNL except     K = 3.6        Fluorouracil (5-FU) 1920 mg/m² x 1.89 m² = 3629 mg              <5% difference, OK to treat with final dose = 3630 mg    CIVI over 46 hours Via home infusion pump at 1.6 ml/hr                   Lalit Andrade, PharmD, BCOP    "

## 2018-10-16 NOTE — PROGRESS NOTES
Pt arrived ambulatory to [facility] for pre-chemotherapy labs.  POC discussed, pt denies new complaints today.  L chest port in place with EMLA cream.  Port accessed in sterile field, brisk blood return noted.  Labs drawn as ordered and sent to lab for processing.  Port flushed with normal saline only and left accessed for return use.  Next appointment confirmed.  Pt discharged from [facility] in NAD under care of spouse.

## 2018-10-18 ENCOUNTER — OUTPATIENT INFUSION SERVICES (OUTPATIENT)
Dept: ONCOLOGY | Facility: MEDICAL CENTER | Age: 78
End: 2018-10-18
Attending: INTERNAL MEDICINE
Payer: MEDICARE

## 2018-10-18 VITALS
HEART RATE: 94 BPM | OXYGEN SATURATION: 97 % | BODY MASS INDEX: 29.13 KG/M2 | DIASTOLIC BLOOD PRESSURE: 66 MMHG | RESPIRATION RATE: 18 BRPM | WEIGHT: 174.82 LBS | TEMPERATURE: 97.7 F | HEIGHT: 65 IN | SYSTOLIC BLOOD PRESSURE: 125 MMHG

## 2018-10-18 DIAGNOSIS — C20 RECTAL CANCER (HCC): ICD-10-CM

## 2018-10-18 DIAGNOSIS — R94.4 DECREASED GFR: ICD-10-CM

## 2018-10-18 PROCEDURE — 96523 IRRIG DRUG DELIVERY DEVICE: CPT

## 2018-10-18 PROCEDURE — 700111 HCHG RX REV CODE 636 W/ 250 OVERRIDE (IP)

## 2018-10-18 RX ADMIN — HEPARIN 500 UNITS: 100 SYRINGE at 11:19

## 2018-10-18 ASSESSMENT — PAIN SCALES - GENERAL: PAINLEVEL_OUTOF10: 0

## 2018-10-18 NOTE — PROGRESS NOTES
Pt presents today for 5FU pump disconnect. Pt offers no new complaints, pump is empty. Port flushed, positive blood return noted, port deaccessed. Pt discharged home stable, will return for next cycle on 10/30.

## 2018-10-23 RX ORDER — 0.9 % SODIUM CHLORIDE 0.9 %
20 VIAL (ML) INJECTION PRN
Status: CANCELLED | OUTPATIENT
Start: 2018-12-10

## 2018-10-25 ENCOUNTER — OUTPATIENT INFUSION SERVICES (OUTPATIENT)
Dept: ONCOLOGY | Facility: MEDICAL CENTER | Age: 78
End: 2018-10-25
Attending: INTERNAL MEDICINE
Payer: MEDICARE

## 2018-10-25 ENCOUNTER — HOSPITAL ENCOUNTER (OUTPATIENT)
Dept: RADIOLOGY | Facility: MEDICAL CENTER | Age: 78
End: 2018-10-25
Attending: NURSE PRACTITIONER
Payer: MEDICARE

## 2018-10-25 VITALS
TEMPERATURE: 97.5 F | RESPIRATION RATE: 18 BRPM | DIASTOLIC BLOOD PRESSURE: 75 MMHG | WEIGHT: 169.09 LBS | OXYGEN SATURATION: 100 % | BODY MASS INDEX: 28.17 KG/M2 | HEIGHT: 65 IN | SYSTOLIC BLOOD PRESSURE: 132 MMHG | HEART RATE: 106 BPM

## 2018-10-25 DIAGNOSIS — C20 RECTAL CANCER (HCC): ICD-10-CM

## 2018-10-25 DIAGNOSIS — C78.7 SECONDARY MALIGNANT NEOPLASM OF LIVER (HCC): ICD-10-CM

## 2018-10-25 PROCEDURE — A4212 NON CORING NEEDLE OR STYLET: HCPCS

## 2018-10-25 PROCEDURE — 700111 HCHG RX REV CODE 636 W/ 250 OVERRIDE (IP)

## 2018-10-25 PROCEDURE — A9552 F18 FDG: HCPCS

## 2018-10-25 PROCEDURE — 96523 IRRIG DRUG DELIVERY DEVICE: CPT

## 2018-10-25 RX ADMIN — HEPARIN: 100 SYRINGE at 15:00

## 2018-10-25 ASSESSMENT — PAIN SCALES - GENERAL: PAINLEVEL_OUTOF10: 0

## 2018-10-25 NOTE — PROGRESS NOTES
Pt presents today for port access prior to CT scan.  Pt offers no new complaints. Port accessed per protocol sterile technique, positive blood return noted. Pt sent to CT with port accessed. Will return to clinic for deaccess afterward. Stable ambulatory to CT Scan.

## 2018-10-28 NOTE — PROGRESS NOTES
"Pharmacy Chemotherapy Verification Note:    Patient Name: Karlene Walters      Dx: Colon CA        Cycle 71 ([place] cycle 63)   Previous treatment: C70 = 10/16/28    Protocol: 5-FU+Leucovorin       IV over 2 hours on Day 1, followed by   IVP on Day 1, followed by    - Dose reduced by 20% to 320 mg/m² starting with Cycle 18 ([place] #11) on 7/5/16 due to neutropenia    10/31/17: delete Leucovorin and 5-FU push d/t neutropenia per Dr. Hardy   Fluorouracil 1200 mg/m²  IV continuous infusion daily on Days 1 and 2 (2400 mg/m² IV over 46-48 hours) -    - Infusion Dose reduced by 20% starting with Cycle 18 ([place] #11) on 7/5/16 due to neutropenia   -20% reduction (1920 mg/m2) to be continued starting C28 per C Alsop APRN   14 day cycles for 12 cycles (adjuvant) or until disease progression or unacceptable toxicity  NCCN Guidelines for Colon Cancer. V.2.2015.  Jay GROVER, et al. Eur J Cancer.1999;35(9):1343-7.      Allergies:  Codeine; Oxaliplatin; Pcn; Sulfa drugs; and Tape       /69   Pulse (!) 106   Temp 37 °C (98.6 °F)   Resp 18   Ht 1.651 m (5' 5\")   Wt 77 kg (169 lb 12.1 oz)   LMP  (LMP Unknown)   SpO2 98%   BMI 28.25 kg/m²  Body surface area is 1.88 meters squared.    10/30/18: CMP on even cycles  ANC~ 2610 Plt = 211k   Hgb = 12.8            Fluorouracil (5-FU) 1920 mg/m² x 1.88m² = 3609mg              <5% difference, OK to treat with final dose = 3610mg    CIVI over 46 hours Via home infusion pump at 1.6ml/hr                   Nakia Arroyo PharmD    "

## 2018-10-28 NOTE — PROGRESS NOTES
"Pharmacy Chemotherapy Verification Note:    Patient Name: Karlene Walters      Dx: Colon CA        Protocol: 5-FU + Leucovorin      *Dosing Reference*   IV over 2 hours on Day 1, followed by    IVP on Day 1, followed by         -- 10/31/17: delete Leucovorin and 5-FU push d/t neutropenia per Dr. Hardy   Fluorouracil   IV continuous infusion daily on Days 1 and 2 ( IV over 46-48 hours)        -- 20% reduction (1920 mg/m²) to be continued starting C28 per C Alsop APRN   14 day cycles for 12 cycles (adjuvant) or until disease progression or unacceptable toxicity  NCCN Guidelines for Colon Cancer. V.2.2015.  Jay T, et al. Eur J Cancer.1999;35(9):1343-7.      Allergies:  Codeine; Oxaliplatin; Pcn; Sulfa drugs; and Tape     /69   Pulse (!) 106   Temp 37 °C (98.6 °F)   Resp 18   Ht 1.651 m (5' 5\")   Wt 77 kg (169 lb 12.1 oz)   LMP  (LMP Unknown)   SpO2 98%   BMI 28.25 kg/m²  Body surface area is 1.88 meters squared.     Labs 10/30/18  ANC~ 2610 Plt = 211k   Hgb = 12.8     SCr = 1.06 mg/dL CrCl ~ 52.7 mL/min   LFT's = 24/21/161 TBili = 0.7     Drug Order   (Drug name, dose, route, IV Fluid & volume, frequency, number of doses) Cycle 71 (C63 in [place])  Previous treatment: C70 10/16/18     Medication = Fluorouracil (5-FU)   Base Dose = 1920 mg/m²  Calc Dose:Base Dose x 1.88 m² = 3609.6 mg  Final Dose = 3610 mg   Route = CIVI  Drug Conc = 50 mg/mL  Fluid & Volume = 72.2 mL (+ 3 mL overfill = 75.2 ml)  Admin Duration = CIVI over 46 hrs @ 1.6 mL/hr   Via CADD pump for home infusion        <5% difference, ok to treat with final dose     By my signature below, I confirm this process was performed independently with the BSA and all final chemotherapy dosing calculations congruent. I have reviewed the above chemotherapy order and that my calculation of the final dose and BSA (when applicable) corroborate those calculations of the  pharmacist.     Demetrius Maldonado, PharmD  "

## 2018-10-29 RX ORDER — DEXTROSE MONOHYDRATE 50 MG/ML
INJECTION, SOLUTION INTRAVENOUS CONTINUOUS
Status: CANCELLED | OUTPATIENT
Start: 2018-10-30

## 2018-10-29 RX ORDER — LIDOCAINE HYDROCHLORIDE 10 MG/ML
10 INJECTION, SOLUTION INFILTRATION; PERINEURAL ONCE
Status: CANCELLED | OUTPATIENT
Start: 2018-10-30 | End: 2018-10-30

## 2018-10-29 RX ORDER — PROCHLORPERAZINE MALEATE 10 MG
10 TABLET ORAL EVERY 6 HOURS PRN
Status: CANCELLED | OUTPATIENT
Start: 2018-10-30

## 2018-10-29 RX ORDER — ONDANSETRON 2 MG/ML
4 INJECTION INTRAMUSCULAR; INTRAVENOUS
Status: CANCELLED | OUTPATIENT
Start: 2018-10-30

## 2018-10-29 RX ORDER — ONDANSETRON 8 MG/1
8 TABLET, ORALLY DISINTEGRATING ORAL
Status: CANCELLED | OUTPATIENT
Start: 2018-10-30

## 2018-10-29 RX ORDER — DEXAMETHASONE SODIUM PHOSPHATE 4 MG/ML
8 INJECTION, SOLUTION INTRA-ARTICULAR; INTRALESIONAL; INTRAMUSCULAR; INTRAVENOUS; SOFT TISSUE ONCE
Status: CANCELLED | OUTPATIENT
Start: 2018-10-30 | End: 2018-10-30

## 2018-10-30 ENCOUNTER — OFFICE VISIT (OUTPATIENT)
Dept: CARDIOLOGY | Facility: MEDICAL CENTER | Age: 78
End: 2018-10-30
Payer: MEDICARE

## 2018-10-30 ENCOUNTER — OFFICE VISIT (OUTPATIENT)
Dept: HEMATOLOGY ONCOLOGY | Facility: MEDICAL CENTER | Age: 78
End: 2018-10-30
Payer: MEDICARE

## 2018-10-30 ENCOUNTER — OUTPATIENT INFUSION SERVICES (OUTPATIENT)
Dept: ONCOLOGY | Facility: MEDICAL CENTER | Age: 78
End: 2018-10-30
Attending: INTERNAL MEDICINE
Payer: MEDICARE

## 2018-10-30 VITALS
BODY MASS INDEX: 28.56 KG/M2 | WEIGHT: 171.41 LBS | DIASTOLIC BLOOD PRESSURE: 80 MMHG | HEART RATE: 106 BPM | TEMPERATURE: 98.1 F | RESPIRATION RATE: 16 BRPM | HEIGHT: 65 IN | SYSTOLIC BLOOD PRESSURE: 108 MMHG | OXYGEN SATURATION: 98 %

## 2018-10-30 VITALS
WEIGHT: 169.75 LBS | SYSTOLIC BLOOD PRESSURE: 142 MMHG | BODY MASS INDEX: 28.28 KG/M2 | TEMPERATURE: 98.6 F | DIASTOLIC BLOOD PRESSURE: 69 MMHG | RESPIRATION RATE: 18 BRPM | HEIGHT: 65 IN | HEART RATE: 106 BPM | OXYGEN SATURATION: 98 %

## 2018-10-30 VITALS
SYSTOLIC BLOOD PRESSURE: 142 MMHG | BODY MASS INDEX: 28.28 KG/M2 | WEIGHT: 169.75 LBS | HEIGHT: 65 IN | HEART RATE: 106 BPM | OXYGEN SATURATION: 98 % | DIASTOLIC BLOOD PRESSURE: 69 MMHG | TEMPERATURE: 98.6 F | RESPIRATION RATE: 18 BRPM

## 2018-10-30 VITALS
OXYGEN SATURATION: 96 % | HEIGHT: 65 IN | DIASTOLIC BLOOD PRESSURE: 78 MMHG | WEIGHT: 170 LBS | SYSTOLIC BLOOD PRESSURE: 136 MMHG | HEART RATE: 96 BPM | BODY MASS INDEX: 28.32 KG/M2

## 2018-10-30 DIAGNOSIS — C78.7 SECONDARY MALIGNANT NEOPLASM OF LIVER (HCC): ICD-10-CM

## 2018-10-30 DIAGNOSIS — R94.4 DECREASED GFR: ICD-10-CM

## 2018-10-30 DIAGNOSIS — C20 RECTAL CANCER (HCC): ICD-10-CM

## 2018-10-30 DIAGNOSIS — I20.89 OTHER FORMS OF ANGINA PECTORIS: ICD-10-CM

## 2018-10-30 LAB
ANISOCYTOSIS BLD QL SMEAR: ABNORMAL
BASOPHILS # BLD AUTO: 1.7 % (ref 0–1.8)
BASOPHILS # BLD: 0.07 K/UL (ref 0–0.12)
DACRYOCYTES BLD QL SMEAR: NORMAL
EOSINOPHIL # BLD AUTO: 0.29 K/UL (ref 0–0.51)
EOSINOPHIL NFR BLD: 6.7 % (ref 0–6.9)
ERYTHROCYTE [DISTWIDTH] IN BLOOD BY AUTOMATED COUNT: 65.1 FL (ref 35.9–50)
GIANT PLATELETS BLD QL SMEAR: NORMAL
HCT VFR BLD AUTO: 40.5 % (ref 37–47)
HGB BLD-MCNC: 12.8 G/DL (ref 12–16)
LYMPHOCYTES # BLD AUTO: 1 K/UL (ref 1–4.8)
LYMPHOCYTES NFR BLD: 23.3 % (ref 22–41)
MACROCYTES BLD QL SMEAR: ABNORMAL
MANUAL DIFF BLD: NORMAL
MCH RBC QN AUTO: 28.2 PG (ref 27–33)
MCHC RBC AUTO-ENTMCNC: 31.6 G/DL (ref 33.6–35)
MCV RBC AUTO: 89.2 FL (ref 81.4–97.8)
MICROCYTES BLD QL SMEAR: ABNORMAL
MONOCYTES # BLD AUTO: 0.32 K/UL (ref 0–0.85)
MONOCYTES NFR BLD AUTO: 7.5 % (ref 0–13.4)
MORPHOLOGY BLD-IMP: NORMAL
NEUTROPHILS # BLD AUTO: 2.61 K/UL (ref 2–7.15)
NEUTROPHILS NFR BLD: 60.8 % (ref 44–72)
NRBC # BLD AUTO: 0 K/UL
NRBC BLD-RTO: 0 /100 WBC
OVALOCYTES BLD QL SMEAR: NORMAL
PLATELET # BLD AUTO: 211 K/UL (ref 164–446)
PLATELET BLD QL SMEAR: NORMAL
PMV BLD AUTO: 10.5 FL (ref 9–12.9)
POIKILOCYTOSIS BLD QL SMEAR: NORMAL
RBC # BLD AUTO: 4.54 M/UL (ref 4.2–5.4)
RBC BLD AUTO: PRESENT
SMUDGE CELLS BLD QL SMEAR: NORMAL
WBC # BLD AUTO: 4.3 K/UL (ref 4.8–10.8)

## 2018-10-30 PROCEDURE — 99215 OFFICE O/P EST HI 40 MIN: CPT | Performed by: INTERNAL MEDICINE

## 2018-10-30 PROCEDURE — A4212 NON CORING NEEDLE OR STYLET: HCPCS

## 2018-10-30 PROCEDURE — 99214 OFFICE O/P EST MOD 30 MIN: CPT | Performed by: NURSE PRACTITIONER

## 2018-10-30 PROCEDURE — 36591 DRAW BLOOD OFF VENOUS DEVICE: CPT

## 2018-10-30 PROCEDURE — 306780 HCHG STAT FOR TRANSFUSION PER CASE

## 2018-10-30 PROCEDURE — 96375 TX/PRO/DX INJ NEW DRUG ADDON: CPT

## 2018-10-30 PROCEDURE — 96374 THER/PROPH/DIAG INJ IV PUSH: CPT | Mod: XU

## 2018-10-30 PROCEDURE — 700111 HCHG RX REV CODE 636 W/ 250 OVERRIDE (IP): Performed by: NURSE PRACTITIONER

## 2018-10-30 PROCEDURE — 85007 BL SMEAR W/DIFF WBC COUNT: CPT

## 2018-10-30 PROCEDURE — 85027 COMPLETE CBC AUTOMATED: CPT

## 2018-10-30 PROCEDURE — G0498 CHEMO EXTEND IV INFUS W/PUMP: HCPCS

## 2018-10-30 RX ORDER — ISOSORBIDE MONONITRATE 30 MG/1
30 TABLET, EXTENDED RELEASE ORAL EVERY MORNING
Qty: 30 TAB | Refills: 3 | Status: SHIPPED | OUTPATIENT
Start: 2018-10-30 | End: 2019-03-07

## 2018-10-30 RX ORDER — DEXAMETHASONE SODIUM PHOSPHATE 4 MG/ML
8 INJECTION, SOLUTION INTRA-ARTICULAR; INTRALESIONAL; INTRAMUSCULAR; INTRAVENOUS; SOFT TISSUE ONCE
Status: COMPLETED | OUTPATIENT
Start: 2018-10-30 | End: 2018-10-30

## 2018-10-30 RX ADMIN — DEXAMETHASONE SODIUM PHOSPHATE 8 MG: 4 INJECTION, SOLUTION INTRAMUSCULAR; INTRAVENOUS at 08:36

## 2018-10-30 RX ADMIN — FLUOROURACIL 3610 MG: 50 INJECTION, SOLUTION INTRAVENOUS at 09:02

## 2018-10-30 ASSESSMENT — ENCOUNTER SYMPTOMS
VOMITING: 0
FALLS: 0
PND: 0
SPUTUM PRODUCTION: 1
HEMOPTYSIS: 0
PALPITATIONS: 0
MYALGIAS: 0
HEADACHES: 0
ORTHOPNEA: 0
DIARRHEA: 0
SHORTNESS OF BREATH: 0
ABDOMINAL PAIN: 0
DIZZINESS: 0
NAUSEA: 0
DIZZINESS: 0
CHILLS: 0
DEPRESSION: 0
COUGH: 1
WEIGHT LOSS: 0
WHEEZING: 0
FEVER: 0
ROS SKIN COMMENTS: DRY SKIN TO HANDS
PALPITATIONS: 1
LOSS OF CONSCIOUSNESS: 0
SHORTNESS OF BREATH: 1
CONSTIPATION: 0

## 2018-10-30 ASSESSMENT — PAIN SCALES - GENERAL
PAINLEVEL_OUTOF10: 0
PAINLEVEL: NO PAIN
PAINLEVEL_OUTOF10: 0

## 2018-10-30 NOTE — LETTER
[facility]   [address], [address]  [place] [address]  Phone: [phone]  Fax: [phone]              Karlene Walters  1940    Encounter Date: 10/30/2018    Shweta Ibrahim M.D.          PROGRESS NOTE:  Chief Complaint   Patient presents with   • Hyperlipidemia     x5mon. f/v   • HTN (Uncontrolled)   • Shortness of Breath       Subjective:   Karlene Walters is a 78 y.o. female who presents today for evaluation of chest discomfort.    Patient has known rectal cancer, metastatic to the liver for which she has been undergoing 5-FU treatment since 2015.  Patient receives 5-FU every 2 weeks for 46 hours.  In the past year, patient has noted that every time she removes her 5-FU pump (every other week) she starts having severe substernal squeezing pain with associated pain in her bilateral axilla.  Symptoms can last anywhere from 10-15 minutes to sometimes an hour.  She denies any associated dyspnea or palpitations.  She usually takes an oxycodone or ibuprofen and then lies down following which her symptoms usually improve.  Sometimes her symptoms occur the day after her pump has been removed as well.  She denies any history of similar symptoms in the past.  Otherwise she has no such symptoms with exercise or exertion.    In 2013, patient underwent a stress test which was abnormal following which she underwent a coronary angiogram with Dr. Holliday that did not show any significant disease.    Her blood pressure is usually 120s-130 systolic.  Heart rate usually 100s.    Past Medical History:   Diagnosis Date   • Arrhythmia    • Blood clotting disorder (HCC) 08/2015    bilat PE    • Blood transfusion 1957   • Cancer (HCC) 09/2012    rectal cancer,  Liver CA-2015   • CATARACT     removed b/l   • Chemotherapy-induced neutropenia (HCC) 9/28/2016   • Chickenpox    • Colon cancer (HCC)    • Dental disorder     dentures UPPERS AND LOWERS   • Elevated alkaline  phosphatase level 11/15/2017   • Fall    • [place] measles    • Hemorrhagic disorder (HCC)     on Xarelto    • Hypercholesteremia    • Hypertension     no meds currently    • Ileostomy in place (HCC)    • Indigestion    • Influenza    • Lightheadedness 9/17/2015   • Mumps    • Obstruction     ileostomy   • Other specified symptom associated with female genital organs     HYSTERECTOMY 1993   • Pneumonia 05/2017    tx'd with antibx   • Pulmonary embolism (HCC)    • 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)    • Renal insufficiency 3/14/2016   • Routine general medical examination at a health care facility 3/14/2016    3/14/16   • Seasonal allergies    • Tonsillitis      Past Surgical History:   Procedure Laterality Date   • COLONOSCOPY WITH BIOPSY  8/23/2015    Procedure: COLONOSCOPY WITH BIOPSY;  Surgeon: Wilbur Glasgow M.D.;  Location: ENDOSCOPY [facility];  Service:    • HEPATIC ABLATION LAPAROSCOPIC  7/6/2015    Procedure: HEPATIC ABLATION LAPAROSCOPIC.;  Surgeon: Kit West M.D.;  Location: SURGERY [facility];  Service:    • CATH PLACEMENT Left 7/6/2015    Procedure: CATH PLACEMENT CEPHALIC POWERPORT;  Surgeon: Kit West M.D.;  Location: SURGERY [facility];  Service:    • FISTULA IN ANO REPAIR  1/28/2015    Performed by Kolton Montgomery M.D. at SURGERY [facility]   • PERINEAL PROCEDURE  1/28/2015    Performed by Kolton Montgomery M.D. at [facility]   • FISTULA IN ANO REPAIR  10/15/2014    Performed by Kolton Montgomery M.D. at SURGERY [facility]   • PERINEAL PROCEDURE  10/15/2014    Performed by Kolton Montgomery M.D. at [facility]   • IRRIGATION & DEBRIDEMENT GENERAL  2/19/2014    Performed by Kolton Montgomery M.D. at [facility]   • FLAP GRAFT  2/19/2014    Performed by Kolton Montgomery M.D. at [facility]   • PERINEAL PROCEDURE  12/18/2013    Performed by Kolton Montgomery M.D. at [facility]   • MYOCUTANEOUS FLAP   12/18/2013    Performed by Kolton Montgomery M.D. at SURGERY [facility]   • IRRIGATION & DEBRIDEMENT GENERAL  10/23/2013    Performed by Kolton Montgomery M.D. at SURGERY [facility]   • FISTULA IN ANO REPAIR  9/4/2013    Performed by Kolton Montgomery M.D. at SURGERY [facility]   • FLAP ROTATION  9/4/2013    Performed by Kolton Montgomery M.D. at SURGERY [facility]   • RECOVERY  8/26/2013    Performed by [facility]Recovery Surgery at [facility] SAME DAY [facility]   • PROCTOSCOPY  7/17/2013    Performed by Kolton Montgomery M.D. at SURGERY [facility]   • BIOPSY GENERAL  7/17/2013    Performed by Kolton Montgomery M.D. at [facility]   • SIGMOIDOSCOPY  2/27/2013    Performed by Kolton Montgomery M.D. at SURGERY [facility]   • FISTULA IN ANO REPAIR  2/27/2013    Performed by Kolton Montgomery M.D. at SURGERY [facility]   • LAPAROSCOPY  10/8/2012    Performed by Kolton Montgomery M.D. at SURGERY [facility]   • ILEO LOOP DIVERSION  10/8/2012    Performed by Kolton Montgomery M.D. at SURGERY [facility]   • COLECTOMY  9/26/2012    Performed by Kolton Montgomery M.D. at [facility]   • COLONOSCOPY FLEX W/ENDO US  9/20/2012    Performed by Harshil Bolton M.D. at SURGERY [facility]   • OTHER  2009    left eye torn retina repair   • CATARACT EXTRACTION WITH IOL  2004    bilateral   • ABDOMINAL HYSTERECTOMY TOTAL  1983   • CYST EXCISION  1972    ovarian   • COLON RESECTION     • EYE SURGERY      cataracts   • HYSTERECTOMY LAPAROSCOPY     • PB REMV 2ND CATARACT,CORN-SCLER SECTN     • TONSILLECTOMY       Family History   Problem Relation Age of Onset   • Heart Disease Mother    • Heart Failure Mother    • Hypertension Mother    • Hyperlipidemia Mother    • Cancer Mother    • Cancer Maternal Aunt 60        breast ca   • Lung Disease Brother         COPD/Emphysema/Smoker   • Cancer Brother         prostate cancer   • Alcohol/Drug Brother         Alcohol   • Kidney Disease Father         renal failure      "    Social History     Social History   • Marital status:      Spouse name: N/A   • Number of children: N/A   • Years of education: N/A     Occupational History   • Not on file.     Social History Main Topics   • Smoking status: Never Smoker   • Smokeless tobacco: Never Used   • Alcohol use No      Comment: rare   • Drug use: No   • Sexual activity: No     Other Topics Concern   • Not on file     Social History Narrative   • No narrative on file     Allergies   Allergen Reactions   • Codeine      \"gets drunk\"   • Oxaliplatin Anaphylaxis   • Pcn [Penicillins] Itching   • Sulfa Drugs Itching   • Tape Rash     PAPER TAPE OK     Outpatient Encounter Prescriptions as of 10/30/2018   Medication Sig Dispense Refill   • isosorbide mononitrate SR (IMDUR) 30 MG TABLET SR 24 HR Take 1 Tab by mouth every morning. 30 Tab 3   • metronidazole (METROCREAM) 0.75 % cream Apply  to affected area(s) 2 times a day.     • raNITidine (ZANTAC) 150 MG Tab TAKE 1 TABLET 2 TIMES A DAY 60 Tab 3   • Tiotropium Bromide-Olodaterol (STIOLTO RESPIMAT) 2.5-2.5 MCG/ACT Aero Soln Take 2 Puffs by mouth every day. 1 Inhaler 11   • cyanocobalamin (VITAMIN B-12) 500 MCG Tab Take 500 mcg by mouth every day.     • Multiple Vitamins-Minerals (CENTRUM SILVER PO) Take  by mouth.     • Cholecalciferol (VITAMIN D3) 400 UNITS CAPS Take 1 Cap by mouth every day.     • loratadine (CLARITIN) 10 MG TABS Take 10 mg by mouth every day. Indications: Hayfever     • XARELTO 10 MG Tab tablet TAKE 1 TABLET EVERY DAY 30 Tab 6   • potassium chloride ER (KLOR-CON) 10 MEQ tablet TAKE ONE TABLET EVERY DAY AS NEEDED 30 Tab 6   • nitroglycerin (NITROSTAT) 0.4 MG SL Tab Place 1 Tab under tongue as needed for Chest Pain. 25 Tab 0   • loperamide (IMODIUM) 2 MG Cap Take 2 mg by mouth 4 times a day as needed for Diarrhea.     • lidocaine-prilocaine (EMLA) 2.5-2.5 % Cream APPLY A THICK FILM OVER THE PORT ACCESS SITE PRIOR TO ACCESS 30 g 2   • albuterol (PROAIR HFA) 108 (90 Base) " "MCG/ACT Aero Soln inhalation aerosol Inhale 2 Puffs by mouth every four hours as needed for Shortness of Breath (wheezing). 1 Inhaler 11   • furosemide (LASIX) 40 MG Tab Take 1 Tab by mouth every day. 30 Tab 11   • ondansetron (ZOFRAN) 4 MG Tab tablet      • [] dexamethasone (DECADRON) injection 8 mg        No facility-administered encounter medications on file as of 10/30/2018.      Review of Systems   Constitutional: Negative for malaise/fatigue.   Respiratory: Negative for shortness of breath.    Cardiovascular: Positive for chest pain. Negative for palpitations, orthopnea, leg swelling and PND.   Gastrointestinal: Negative for abdominal pain.   Musculoskeletal: Negative for falls.   Neurological: Negative for dizziness and loss of consciousness.   Psychiatric/Behavioral: Negative for depression.   All other systems reviewed and are negative.       Objective:   /78 (BP Location: Left arm, Patient Position: Sitting, BP Cuff Size: Adult)   Pulse 96   Ht 1.651 m (5' 5\")   Wt 77.1 kg (170 lb)   LMP  (LMP Unknown)   SpO2 96%   BMI 28.29 kg/m²      Physical Exam   Constitutional: She is oriented to person, place, and time. She appears well-developed and well-nourished. No distress.   HENT:   Head: Normocephalic and atraumatic.   Eyes: Conjunctivae are normal.   Neck: Normal range of motion. Neck supple.   Cardiovascular: Normal rate, regular rhythm and normal heart sounds.  Exam reveals no gallop and no friction rub.    No murmur heard.  Pulmonary/Chest: Effort normal and breath sounds normal. No respiratory distress. She has no wheezes. She has no rales.   Abdominal: Soft. She exhibits no distension. There is no tenderness.   Musculoskeletal: She exhibits no edema.   Neurological: She is alert and oriented to person, place, and time.   Skin: Skin is warm and dry. She is not diaphoretic.   Psychiatric: She has a normal mood and affect. Her behavior is normal.   Nursing note and vitals " reviewed.    Labs performed in October 2018 were reviewed and showed a normal creatinine.    Echocardiogram performed October 2017 was personally reviewed and per my interpretation shows normal LV systolic function.  No major valvular pathology noted.    Assessment:     1. Other forms of angina pectoris (HCC)  NM-CARDIAC PET       Medical Decision Making:  Today's Assessment / Status / Plan:     Chest pain: Fairly atypical as symptoms only occur when her 5-FU pump is removed.  Could be secondary to coronary vasospasm which is a known adverse effect with 5-FU, however this is a rare side effect.  Also she is not taking any vasodilators and her symptoms usually spontaneously resolve.  Her oncologist, Dr. Hardy, believes that this is likely coronary vasospasm in the setting of 5-FU.  Therefore I will start her on isosorbide mononitrate to be taken on the date that her pump is removed and the following day in the morning (the days that she has symptoms).  I will also refer her for a cardiac PET for ischemic evaluation.  Her symptoms do not appear to be related to arrhythmias, at least based on her history.  She will be reevaluated in 1 month after her PET.  If there is any clinical concern for her having any underlying arrhythmia as the cause of her symptoms, she would benefit with ambulatory EKG monitoring.  She already has nitroglycerin as needed in case her symptoms severely worsen.    Return to clinic in 1 month or earlier if needed.    Thank you for allowing me to participate in the care of this patient. Please do not hesitate to contact me with any questions.    Shweta Ibrahim MD  Cardiologist  [facility]      PLEASE NOTE: This dictation was created using voice recognition software.         Manan Quiñonez M.D.  [address] [place] [address] [place] [address]  VIA In Basket

## 2018-10-30 NOTE — PATIENT INSTRUCTIONS
Please take isosorbide mononitrate on the days that you remove your 5-FU pump and the next day.  Please take the dose in the morning.

## 2018-10-30 NOTE — PROGRESS NOTES
Pt ambulatory w/  to [facility] for CADD pump hook-up.  Pt w/ no s/s of infection, pt labs w/n parameters for tx today.  Pt PORT still accessed from lab draw this morning, brisk blood return noted, flushed per protocol.  Decadron given by IVP as pre=med w/ no adverse reactions.  CADD pump connected to pt's PORT, all clamps opened and pump confirmed to be in RUN mode.  CADD pump placed in pt's konrad pack w/ extra batteries.  Pt left on foot in NAD w/ .  Confirmed pt's next appt for de-access.

## 2018-10-30 NOTE — PROGRESS NOTES
Chief Complaint   Patient presents with   • Hyperlipidemia     x5mon. f/v   • HTN (Uncontrolled)   • Shortness of Breath       Subjective:   Karlene Walters is a 78 y.o. female who presents today for evaluation of chest discomfort.    Patient has known rectal cancer, metastatic to the liver for which she has been undergoing 5-FU treatment since 2015.  Patient receives 5-FU every 2 weeks for 46 hours.  In the past year, patient has noted that every time she removes her 5-FU pump (every other week) she starts having severe substernal squeezing pain with associated pain in her bilateral axilla.  Symptoms can last anywhere from 10-15 minutes to sometimes an hour.  She denies any associated dyspnea or palpitations.  She usually takes an oxycodone or ibuprofen and then lies down following which her symptoms usually improve.  Sometimes her symptoms occur the day after her pump has been removed as well.  She denies any history of similar symptoms in the past.  Otherwise she has no such symptoms with exercise or exertion.    In 2013, patient underwent a stress test which was abnormal following which she underwent a coronary angiogram with Dr. Holliday that did not show any significant disease.    Her blood pressure is usually 120s-130 systolic.  Heart rate usually 100s.    Past Medical History:   Diagnosis Date   • Arrhythmia    • Blood clotting disorder (HCC) 08/2015    bilat PE    • Blood transfusion 1957   • Cancer (HCC) 09/2012    rectal cancer,  Liver CA-2015   • CATARACT     removed b/l   • Chemotherapy-induced neutropenia (HCC) 9/28/2016   • Chickenpox    • Colon cancer (HCC)    • Dental disorder     dentures UPPERS AND LOWERS   • Elevated alkaline phosphatase level 11/15/2017   • Fall    • Japanese measles    • Hemorrhagic disorder (HCC)     on Xarelto    • Hypercholesteremia    • Hypertension     no meds currently    • Ileostomy in place (HCC)    • Indigestion    • Influenza    • Lightheadedness 9/17/2015   •  Mumps    • Obstruction     ileostomy   • Other specified symptom associated with female genital organs     HYSTERECTOMY 1993   • Pneumonia 05/2017    tx'd with antibx   • Pulmonary embolism (HCC)    • 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)    • Renal insufficiency 3/14/2016   • Routine general medical examination at a [facility] care facility 3/14/2016    3/14/16   • Seasonal allergies    • Tonsillitis      Past Surgical History:   Procedure Laterality Date   • COLONOSCOPY WITH BIOPSY  8/23/2015    Procedure: COLONOSCOPY WITH BIOPSY;  Surgeon: Wilbur Glasgow M.D.;  Location: ENDOSCOPY [facility];  Service:    • HEPATIC ABLATION LAPAROSCOPIC  7/6/2015    Procedure: HEPATIC ABLATION LAPAROSCOPIC.;  Surgeon: Kit West M.D.;  Location: SURGERY [facility];  Service:    • CATH PLACEMENT Left 7/6/2015    Procedure: CATH PLACEMENT CEPHALIC POWERPORT;  Surgeon: Kit West M.D.;  Location: SURGERY [facility];  Service:    • FISTULA IN ANO REPAIR  1/28/2015    Performed by Kolton Montgomery M.D. at SURGERY [facility]   • PERINEAL PROCEDURE  1/28/2015    Performed by Kolton Montgomery M.D. at [facility]   • FISTULA IN ANO REPAIR  10/15/2014    Performed by Kolton Montgomery M.D. at [facility]   • PERINEAL PROCEDURE  10/15/2014    Performed by Kolton Montgomery M.D. at [facility]   • IRRIGATION & DEBRIDEMENT GENERAL  2/19/2014    Performed by Kolton Montgomery M.D. at [facility]   • FLAP GRAFT  2/19/2014    Performed by Kolton Montgomery M.D. at [facility]   • PERINEAL PROCEDURE  12/18/2013    Performed by Kolton Montgomery M.D. at [facility]   • MYOCUTANEOUS FLAP  12/18/2013    Performed by Kolton Montgomery M.D. at [facility]   • IRRIGATION & DEBRIDEMENT GENERAL  10/23/2013    Performed by Kolton Montgomery M.D. at [facility]   • FISTULA IN ANO REPAIR  9/4/2013    Performed by Kolton Montgomery M.D. at SURGERY  [facility]   • FLAP ROTATION  9/4/2013    Performed by Kolton Montgomery M.D. at SURGERY [facility]   • RECOVERY  8/26/2013    Performed by [facility] at [facility] SAME DAY [facility]   • PROCTOSCOPY  7/17/2013    Performed by Kolton Montgomery M.D. at SURGERY [facility]   • BIOPSY GENERAL  7/17/2013    Performed by Kolton Montgomery M.D. at SURGERY [facility]   • SIGMOIDOSCOPY  2/27/2013    Performed by Kolton Montgomery M.D. at SURGERY [facility]   • FISTULA IN ANO REPAIR  2/27/2013    Performed by Kolton Montgomery M.D. at SURGERY [facility]   • LAPAROSCOPY  10/8/2012    Performed by Kolton Montgomery M.D. at [facility]   • ILEO LOOP DIVERSION  10/8/2012    Performed by Kolton Montgomery M.D. at SURGERY [facility]   • COLECTOMY  9/26/2012    Performed by Kolton Montgomery M.D. at SURGERY [facility]   • COLONOSCOPY FLEX W/ENDO US  9/20/2012    Performed by Harshil Bolton M.D. at SURGERY [facility]   • OTHER  2009    left eye torn retina repair   • CATARACT EXTRACTION WITH IOL  2004    bilateral   • ABDOMINAL HYSTERECTOMY TOTAL  1983   • CYST EXCISION  1972    ovarian   • COLON RESECTION     • EYE SURGERY      cataracts   • HYSTERECTOMY LAPAROSCOPY     • PB REMV 2ND CATARACT,CORN-SCLER SECTN     • TONSILLECTOMY       Family History   Problem Relation Age of Onset   • Heart Disease Mother    • Heart Failure Mother    • Hypertension Mother    • Hyperlipidemia Mother    • Cancer Mother    • Cancer Maternal Aunt 60        breast ca   • Lung Disease Brother         COPD/Emphysema/Smoker   • Cancer Brother         prostate cancer   • Alcohol/Drug Brother         Alcohol   • Kidney Disease Father         renal failure     Social History     Social History   • Marital status:      Spouse name: N/A   • Number of children: N/A   • Years of education: N/A     Occupational History   • Not on file.     Social History Main Topics   • Smoking status: Never Smoker   • Smokeless  "tobacco: Never Used   • Alcohol use No      Comment: rare   • Drug use: No   • Sexual activity: No     Other Topics Concern   • Not on file     Social History Narrative   • No narrative on file     Allergies   Allergen Reactions   • Codeine      \"gets drunk\"   • Oxaliplatin Anaphylaxis   • Pcn [Penicillins] Itching   • Sulfa Drugs Itching   • Tape Rash     PAPER TAPE OK     Outpatient Encounter Prescriptions as of 10/30/2018   Medication Sig Dispense Refill   • isosorbide mononitrate SR (IMDUR) 30 MG TABLET SR 24 HR Take 1 Tab by mouth every morning. 30 Tab 3   • metronidazole (METROCREAM) 0.75 % cream Apply  to affected area(s) 2 times a day.     • raNITidine (ZANTAC) 150 MG Tab TAKE 1 TABLET 2 TIMES A DAY 60 Tab 3   • Tiotropium Bromide-Olodaterol (STIOLTO RESPIMAT) 2.5-2.5 MCG/ACT Aero Soln Take 2 Puffs by mouth every day. 1 Inhaler 11   • cyanocobalamin (VITAMIN B-12) 500 MCG Tab Take 500 mcg by mouth every day.     • Multiple Vitamins-Minerals (CENTRUM SILVER PO) Take  by mouth.     • Cholecalciferol (VITAMIN D3) 400 UNITS CAPS Take 1 Cap by mouth every day.     • loratadine (CLARITIN) 10 MG TABS Take 10 mg by mouth every day. Indications: Hayfever     • XARELTO 10 MG Tab tablet TAKE 1 TABLET EVERY DAY 30 Tab 6   • potassium chloride ER (KLOR-CON) 10 MEQ tablet TAKE ONE TABLET EVERY DAY AS NEEDED 30 Tab 6   • nitroglycerin (NITROSTAT) 0.4 MG SL Tab Place 1 Tab under tongue as needed for Chest Pain. 25 Tab 0   • loperamide (IMODIUM) 2 MG Cap Take 2 mg by mouth 4 times a day as needed for Diarrhea.     • lidocaine-prilocaine (EMLA) 2.5-2.5 % Cream APPLY A THICK FILM OVER THE PORT ACCESS SITE PRIOR TO ACCESS 30 g 2   • albuterol (PROAIR HFA) 108 (90 Base) MCG/ACT Aero Soln inhalation aerosol Inhale 2 Puffs by mouth every four hours as needed for Shortness of Breath (wheezing). 1 Inhaler 11   • furosemide (LASIX) 40 MG Tab Take 1 Tab by mouth every day. 30 Tab 11   • ondansetron (ZOFRAN) 4 MG Tab tablet      • " "[] dexamethasone (DECADRON) injection 8 mg        No facility-administered encounter medications on file as of 10/30/2018.      Review of Systems   Constitutional: Negative for malaise/fatigue.   Respiratory: Negative for shortness of breath.    Cardiovascular: Positive for chest pain. Negative for palpitations, orthopnea, leg swelling and PND.   Gastrointestinal: Negative for abdominal pain.   Musculoskeletal: Negative for falls.   Neurological: Negative for dizziness and loss of consciousness.   Psychiatric/Behavioral: Negative for depression.   All other systems reviewed and are negative.       Objective:   /78 (BP Location: Left arm, Patient Position: Sitting, BP Cuff Size: Adult)   Pulse 96   Ht 1.651 m (5' 5\")   Wt 77.1 kg (170 lb)   LMP  (LMP Unknown)   SpO2 96%   BMI 28.29 kg/m²     Physical Exam   Constitutional: She is oriented to person, place, and time. She appears well-developed and well-nourished. No distress.   HENT:   Head: Normocephalic and atraumatic.   Eyes: Conjunctivae are normal.   Neck: Normal range of motion. Neck supple.   Cardiovascular: Normal rate, regular rhythm and normal heart sounds.  Exam reveals no gallop and no friction rub.    No murmur heard.  Pulmonary/Chest: Effort normal and breath sounds normal. No respiratory distress. She has no wheezes. She has no rales.   Abdominal: Soft. She exhibits no distension. There is no tenderness.   Musculoskeletal: She exhibits no edema.   Neurological: She is alert and oriented to person, place, and time.   Skin: Skin is warm and dry. She is not diaphoretic.   Psychiatric: She has a normal mood and affect. Her behavior is normal.   Nursing note and vitals reviewed.    Labs performed in 2018 were reviewed and showed a normal creatinine.    Echocardiogram performed 2017 was personally reviewed and per my interpretation shows normal LV systolic function.  No major valvular pathology noted.    Assessment:     1. " Other forms of angina pectoris (HCC)  NM-CARDIAC PET       Medical Decision Making:  Today's Assessment / Status / Plan:     Chest pain: Fairly atypical as symptoms only occur when her 5-FU pump is removed.  Could be secondary to coronary vasospasm which is a known adverse effect with 5-FU, however this is a rare side effect.  Also she is not taking any vasodilators and her symptoms usually spontaneously resolve.  Her oncologist, Dr. Hardy, believes that this is likely coronary vasospasm in the setting of 5-FU.  Therefore I will start her on isosorbide mononitrate to be taken on the date that her pump is removed and the following day in the morning (the days that she has symptoms).  I will also refer her for a cardiac PET for ischemic evaluation.  Her symptoms do not appear to be related to arrhythmias, at least based on her history.  She will be reevaluated in 1 month after her PET.  If there is any clinical concern for her having any underlying arrhythmia as the cause of her symptoms, she would benefit with ambulatory EKG monitoring.  She already has nitroglycerin as needed in case her symptoms severely worsen.    Return to clinic in 1 month or earlier if needed.    Thank you for allowing me to participate in the care of this patient. Please do not hesitate to contact me with any questions.    Shweta Ibrahim MD  Cardiologist  [facility]      PLEASE NOTE: This dictation was created using voice recognition software.

## 2018-10-30 NOTE — PROGRESS NOTES
Subjective:      Karlene Walters is a 78 y.o. female who presents with Follow-Up (PreChemo PET/CT results) for metastatic rectal cancer.         HPI    Patient seen today in follow-up for metastatic rectal cancer.  She presents unaccompanied for today's visit.  Patient is scheduled to receive cycle #63 of single agent 5-FU.    Patient has been having a slow trend up of her CEA.  Most recent was 13.8, up from 8.2 in September.  Based on these findings Dr. Hardy requested patient proceed with a PET/CT.  Patient completed PET/CT on October 25, 2018.  She is here to discuss results.  Lungs do not show any hypermetabolic pulmonary nodules.  There is no hypermetabolic hilar, mediastinal or axillary adenopathy.  There is a 2.5 cm hypermetabolic activity adjacent to the low-attenuation area in the hepatic dome with a maximum SUV of 10.2.  According to the reading radiologist this is keeping with persistent residual disease next to the treatment bed of the liver.  There is normal, uniform metabolic activity in the spleen, adrenal glands and kidneys.  There is no activity noted in the mesenteric, retroperitoneal, iliac or inguinal lymphadenopathy.  There is also nonspecific physiologic activity in the colon intestine and right lower quadrant ostomy area.  I personally reviewed the imaging report and images in detail and reviewed with the patient today.    Patient is feeling very well.  She does have fatigue intermittently.  She denies any fevers, chills or weight loss.  Appetite is been stable.  She does have persistent thick sputum cough with some shortness of breath.  She states the shortness of breath is worse at times but also stable.  She denies any hemoptysis or wheezing.  She does have heart palpitations every once in a while and is following up with cardiology today.  She stated she has been taking Lasix more often due to lower extremity edema.  She has been taking it approximately twice a week along with her  "potassium.  She denies any chest pain.  She denies any nausea, vomiting, diarrhea or constipation.  Her output via ostomy has been within normal limits.  She is voiding without difficulty denies any pain.  She did have a biopsy of a lesion on the right hand, and is scheduled to follow-up with the dermatologist this Friday.  She does have some itching noted at that area at times.    Allergies   Allergen Reactions   • Codeine      \"gets drunk\"   • Oxaliplatin Anaphylaxis   • Pcn [Penicillins] Itching   • Sulfa Drugs Itching   • Tape Rash     PAPER TAPE OK     Current Outpatient Prescriptions on File Prior to Visit   Medication Sig Dispense Refill   • XARELTO 10 MG Tab tablet TAKE 1 TABLET EVERY DAY 30 Tab 6   • metronidazole (METROCREAM) 0.75 % cream Apply  to affected area(s) 2 times a day.     • raNITidine (ZANTAC) 150 MG Tab TAKE 1 TABLET 2 TIMES A DAY 60 Tab 3   • potassium chloride ER (KLOR-CON) 10 MEQ tablet TAKE ONE TABLET EVERY DAY AS NEEDED 30 Tab 6   • nitroglycerin (NITROSTAT) 0.4 MG SL Tab Place 1 Tab under tongue as needed for Chest Pain. 25 Tab 0   • loperamide (IMODIUM) 2 MG Cap Take 2 mg by mouth 4 times a day as needed for Diarrhea.     • lidocaine-prilocaine (EMLA) 2.5-2.5 % Cream APPLY A THICK FILM OVER THE PORT ACCESS SITE PRIOR TO ACCESS 30 g 2   • Tiotropium Bromide-Olodaterol (STIOLTO RESPIMAT) 2.5-2.5 MCG/ACT Aero Soln Take 2 Puffs by mouth every day. 1 Inhaler 11   • albuterol (PROAIR HFA) 108 (90 Base) MCG/ACT Aero Soln inhalation aerosol Inhale 2 Puffs by mouth every four hours as needed for Shortness of Breath (wheezing). 1 Inhaler 11   • furosemide (LASIX) 40 MG Tab Take 1 Tab by mouth every day. 30 Tab 11   • ondansetron (ZOFRAN) 4 MG Tab tablet      • cyanocobalamin (VITAMIN B-12) 500 MCG Tab Take 500 mcg by mouth every day.     • Multiple Vitamins-Minerals (CENTRUM SILVER PO) Take  by mouth.     • Cholecalciferol (VITAMIN D3) 400 UNITS CAPS Take 1 Cap by mouth every day.     • " "loratadine (CLARITIN) 10 MG TABS Take 10 mg by mouth every day. Indications: Hayfever       No current facility-administered medications on file prior to visit.        Review of Systems   Constitutional: Positive for malaise/fatigue (intermittently). Negative for chills, fever and weight loss.   Respiratory: Positive for cough (thick sputum intermittently), sputum production and shortness of breath (worse at times, but sometimes stable). Negative for hemoptysis and wheezing.    Cardiovascular: Positive for palpitations (very mild every once in a while) and leg swelling (feels like she is using Lasix more - about twice a week - seeing cardiology today). Negative for chest pain.   Gastrointestinal: Negative for constipation, diarrhea, nausea and vomiting.   Genitourinary: Negative for dysuria.   Musculoskeletal: Negative for myalgias.   Skin: Negative for itching and rash.        Dry skin to hands   Neurological: Negative for dizziness and headaches.          Objective:     /80 (BP Location: Right arm, Patient Position: Sitting, BP Cuff Size: Adult)   Pulse (!) 106   Temp 36.7 °C (98.1 °F) (Temporal)   Resp 16   Ht 1.651 m (5' 5\")   Wt 77.7 kg (171 lb 6.5 oz)   LMP  (LMP Unknown)   SpO2 98%   BMI 28.52 kg/m²      Physical Exam   Constitutional: She is oriented to person, place, and time. She appears well-developed and well-nourished. No distress.   HENT:   Head: Normocephalic and atraumatic.   Mouth/Throat: Oropharynx is clear and moist. No oropharyngeal exudate.   Cardiovascular: Regular rhythm, normal heart sounds and intact distal pulses.  Exam reveals no gallop and no friction rub.    No murmur heard.  Slightly tachycardic at 106.   Pulmonary/Chest: Effort normal and breath sounds normal. No respiratory distress. She has no wheezes.   Abdominal: Soft. Bowel sounds are normal. She exhibits no distension. There is no tenderness.   Musculoskeletal: Normal range of motion. She exhibits no edema or " tenderness.   Neurological: She is alert and oriented to person, place, and time.   Skin: Skin is warm and dry. No rash noted. She is not diaphoretic. No erythema. No pallor.   Psychiatric: She has a normal mood and affect. Her behavior is normal.   Vitals reviewed.      [id number] Base To Mid-thigh    Result Date: 10/25/2018  10/25/2018 12:24 PM HISTORY/REASON FOR EXAM:  metastatic colon cancer, with elevation of CEA TECHNIQUE/EXAM DESCRIPTION AND NUMBER OF VIEWS: PET body imaging. Initially, 15 mCi F-18 FDG was administered intravenously under standardized conditions. Approximately 45 minutes after FDG administration, the patient was placed in the supine position on the PET CT table. Blood glucose level was 74 mg/dL. Low dose spiral CT imaging was performed from the skull base to the mid thighs. PET imaging was then performed from the skull base to the mid thighs. CT images, PET images, and PET/CT fused images were reviewed on a PACS 3D workstation. The limited non-contrast CT data are used primarily for attenuation correction and anatomic correlation.  Evaluation of solid organs and bowel are especially limited utilizing this technique. A low dose CT was obtained of the same area without IV contrast for attenuation correction and coregistration, not for a diagnostic scan. COMPARISON: CT 9/7/2018. FINDINGS: VISUALIZED BRAIN: Normal metabolic activity. HEAD AND NECK: Normal physiologic uptake. CHEST: Background blood pool activity shows average SUV of 2.6. Lungs show no hypermetabolic pulmonary nodules. Activity within the left chest port. There is tracer contamination anterior to the chest wall. There is no hypermetabolic hilar, mediastinal, or axillary lymphadenopathy. ABDOMEN AND PELVIS: Background liver activity shows average SUV of 3.3. There is a 2.5 cm hypermetabolic activity adjacent to the low attenuation area (on the right) in the hepatic dome (SUV max 10.2) There is normal, uniform metabolic activity  in the spleen, adrenal glands, kidneys. There is no hypermetabolic mesenteric, retroperitoneal, iliac, or inguinal lymphadenopathy. Nonspecific physiologic activity in the colon and intestine is noted. Physiologic activity is seen in the right lower quadrant ostomy. VISUALIZED MUSCULOSKELETAL SYSTEM: No hypermetabolic activity to suggest osteolytic metastases or sclerosis to suggest osteoblastic metastases.     Hypermetabolic 2.5 cm lesion adjacent/to the right of the low attenuation treatment bed in the hepatic dome, in keeping with persistent residual disease next to the treatment bed.         Assessment/Plan:     1. Rectal cancer (HCC)       Plan  1.  Patient with metastatic rectal carcinoma currently undergoing single agent 5-FU.  Patient is scheduled for cycle #63.  He was having a trend up of her CEA levels and underwent a PET/CT.  PET/CT does appear to be stable, with a 2.5 cm lesion noted in the hepatic dome keeping with persistent of residual disease.  Overall patient is feeling well and tolerating treatment well.  Clinically she is stable.  I have reviewed the CBC and labs are appropriate to proceed with treatment today as planned.    2.  Patient is to follow-up with cardiology as planned.  She is scheduled to see them today and will discuss need for increased Lasix.    3.  Patient following up with dermatology this coming Friday for evaluation status post biopsy.  Patient to proceed with recommend agents per dermatology.    4.  Patient is to follow-up again in the clinic in 1 month or sooner if needed.  She is to continue with treatment every 2 weeks as planned.

## 2018-10-30 NOTE — PROGRESS NOTES
Chemotherapy Verification - PRIMARY RN      Height = 1.651 m  Weight = 778 kg  BSA = 1.88 m2       Medication: fluorouracil  CADD pump Dose: 1920 mg/m2  Calculated Dose: 3609.6 mg over 46 hours                            (In mg/m2, AUC, mg/kg)     I confirm this process was performed independently with the BSA and all final chemotherapy dosing calculations congruent.  Any discrepancies of 5% or greater have been addressed with the chemotherapy pharmacist. The resolution of the discrepancy has been documented in the EPIC progress notes.

## 2018-10-30 NOTE — PROGRESS NOTES
Pt ambulatory to [facility] for pre-chemo lab draw.  Pt w/ no s/s of infection, pt only complaint is continuation of dry hacking cough, MD aware.  Pt PORT w/ EMLA cream in place, EMLA wiped clean, PORT accessed using sterile technique, brisk blood return noted, lab drawn per order, flushed per protocol.  Pt left on foot to see Ella MORALES and will return @ 0830 for chemo appt.  Confirmed w/ pt.

## 2018-10-30 NOTE — PROGRESS NOTES
Chemotherapy Verification - SECONDARY RN       Height = 65 in  Weight = 77 kg  BSA = 1.88 m2       Medication: 5 FU pump  Dose: 1920 mg/m2 over 46 hours  Calculated Dose: 3609.6 mg over 46 hours                             (In mg/m2, AUC, mg/kg)     I confirm that this process was performed independently.

## 2018-10-31 ENCOUNTER — TELEPHONE (OUTPATIENT)
Dept: HEMATOLOGY ONCOLOGY | Facility: MEDICAL CENTER | Age: 78
End: 2018-10-31

## 2018-10-31 NOTE — TELEPHONE ENCOUNTER
Patient is requesting a call back regarding yesterday's conversation with ANDREW Krishnan. Patient stated she is to make an appointment with Dr. ROJAS but it not quite sure for when or if we're going to be scheduling this appointment with Dr. ROJAS. Please advise.

## 2018-11-01 ENCOUNTER — OUTPATIENT INFUSION SERVICES (OUTPATIENT)
Dept: ONCOLOGY | Facility: MEDICAL CENTER | Age: 78
End: 2018-11-01
Attending: INTERNAL MEDICINE
Payer: MEDICARE

## 2018-11-01 VITALS
OXYGEN SATURATION: 100 % | RESPIRATION RATE: 18 BRPM | DIASTOLIC BLOOD PRESSURE: 59 MMHG | HEIGHT: 65 IN | HEART RATE: 99 BPM | TEMPERATURE: 97.5 F | SYSTOLIC BLOOD PRESSURE: 131 MMHG | BODY MASS INDEX: 29.2 KG/M2 | WEIGHT: 175.27 LBS

## 2018-11-01 PROCEDURE — 96523 IRRIG DRUG DELIVERY DEVICE: CPT

## 2018-11-01 PROCEDURE — 700111 HCHG RX REV CODE 636 W/ 250 OVERRIDE (IP)

## 2018-11-01 RX ADMIN — HEPARIN 500 UNITS: 100 SYRINGE at 11:53

## 2018-11-01 ASSESSMENT — PAIN SCALES - GENERAL: PAINLEVEL_OUTOF10: 0

## 2018-11-01 NOTE — PROGRESS NOTES
Patient called to the Infusion Center stating that she had discomfort at the port insertion site and noticed swelling at the port insertion site. Patient instructed to stop the pump and come into the Infusion Center. Patient arrived to Infusion Center. Mild swelling noted above the port. Attempted to aspirate from port; no blood return noted. Tegaderm dressing removed. Port needle dislodged. Port re-accessed with 1 1/2' needle; brisk blood return noted. Patient reconnected to pump and pump restarted. Leslye RN verified rate with this RN. All clamps open. Dr. Perez (on-call for Dr. Hardy) notified. Instructed patient to use cold compresses for discomfort. Per Dr. Perez, RN to call Dr. Hardy tomorrow about infiltration of 5FU tomorrow. Next appointment scheduled. Discharged to self care; no apparent distress noted.

## 2018-11-01 NOTE — PROGRESS NOTES
Patient presents ambulatory for home pump disconnect.  Pump reservoir reads 0 mL, returned to pharmacy.  Port de accessed by AZEB Oliveira.  Patient to return 11/13/18 for next appt.  Patient left ambulatory in no distress.

## 2018-11-02 ENCOUNTER — APPOINTMENT (RX ONLY)
Dept: URBAN - METROPOLITAN AREA CLINIC 4 | Facility: CLINIC | Age: 78
Setting detail: DERMATOLOGY
End: 2018-11-02

## 2018-11-02 DIAGNOSIS — L57.0 ACTINIC KERATOSIS: ICD-10-CM

## 2018-11-02 PROCEDURE — ? PRESCRIPTION

## 2018-11-02 PROCEDURE — ? OBSERVATION

## 2018-11-02 PROCEDURE — 99212 OFFICE O/P EST SF 10 MIN: CPT

## 2018-11-02 RX ORDER — TRIAMCINOLONE ACETONIDE 1 MG/G
CREAM TOPICAL
Qty: 1 | Refills: 1 | Status: ERX | COMMUNITY
Start: 2018-11-02

## 2018-11-02 RX ADMIN — TRIAMCINOLONE ACETONIDE 1: 1 CREAM TOPICAL at 00:00

## 2018-11-02 ASSESSMENT — LOCATION ZONE DERM: LOCATION ZONE: HAND

## 2018-11-02 ASSESSMENT — LOCATION DETAILED DESCRIPTION DERM: LOCATION DETAILED: LEFT DORSAL RING FINGER METACARPOPHALANGEAL JOINT

## 2018-11-02 ASSESSMENT — LOCATION SIMPLE DESCRIPTION DERM: LOCATION SIMPLE: LEFT HAND

## 2018-11-05 NOTE — PROGRESS NOTES
Subjective:      Primary care physician:Manan Quiñonez M.D.  Radiation oncology:NA  Surgery:Kit West    1. Metastasis to liver (HCC)     2. Malignant neoplasm of rectum (HCC)           History of presenting illness:  Ms. Walters  is a pleasant 78 y.o. year old female who presented with what appears to be recurrence in segment 7/8 from a previous ablation.  The patient underwent an ablation back in 2015 of an isolated rectal mass.  She did extremely well for 2 years but then had a recurrence.  She then underwent an yttrium-90 in 2017 and unfortunately on her most recent PET scan in 2018 there is uptake in that region.  She has no other uptake anywhere else in the abdomen.  She is an ECOG performance status of 0.  She does complain of intermittent chest pain and her cardiologist did order a PET scan of her heart to confirm whether she may have some issues regarding chemotherapy.  In any event, she is here to discuss the management of this recurrent liver metastasis in the dome of the liver.  It measures 2.5 cm in size.  I personally reviewed the PET scan as well as her most recent liver CT.  The patient has undergone a ablation in the past and understands how it works.  She is here to discuss the next steps in her care.      Past Medical History:   Diagnosis Date   • Arrhythmia    • Blood clotting disorder (HCC) 08/2015    bilat PE    • Blood transfusion 1957   • Cancer (HCC) 09/2012    rectal cancer,  Liver CA-2015   • CATARACT     removed b/l   • Chemotherapy-induced neutropenia (HCC) 9/28/2016   • Chickenpox    • Colon cancer (HCC)    • Dental disorder     dentures UPPERS AND LOWERS   • Elevated alkaline phosphatase level 11/15/2017   • Fall    • Citizen of [place] measles    • Hemorrhagic disorder (HCC)     on Xarelto    • Hypercholesteremia    • Hypertension     no meds currently    • Ileostomy in place (HCC)    • Indigestion    • Influenza    • Lightheadedness 9/17/2015   • Mumps    • Obstruction     ileostomy    • Other specified symptom associated with female genital organs     HYSTERECTOMY 1993   • Pneumonia 05/2017    tx'd with antibx   • Pulmonary embolism (HCC)    • 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)    • Renal insufficiency 3/14/2016   • Routine general medical examination at a health care facility 3/14/2016    3/14/16   • Seasonal allergies    • Tonsillitis      Past Surgical History:   Procedure Laterality Date   • COLONOSCOPY WITH BIOPSY  8/23/2015    Procedure: COLONOSCOPY WITH BIOPSY;  Surgeon: Wilbur Glasgow M.D.;  Location: ENDOSCOPY [facility];  Service:    • HEPATIC ABLATION LAPAROSCOPIC  7/6/2015    Procedure: HEPATIC ABLATION LAPAROSCOPIC.;  Surgeon: Kit West M.D.;  Location: SURGERY [facility];  Service:    • CATH PLACEMENT Left 7/6/2015    Procedure: CATH PLACEMENT CEPHALIC POWERPORT;  Surgeon: Kit West M.D.;  Location: SURGERY [facility];  Service:    • FISTULA IN ANO REPAIR  1/28/2015    Performed by Kolton Montgomery M.D. at [facility]   • PERINEAL PROCEDURE  1/28/2015    Performed by Kolton Montgomery M.D. at [facility]   • FISTULA IN ANO REPAIR  10/15/2014    Performed by Kolton Montgomery M.D. at [facility]   • PERINEAL PROCEDURE  10/15/2014    Performed by Kolton Montgomery M.D. at [facility]   • IRRIGATION & DEBRIDEMENT GENERAL  2/19/2014    Performed by Kolton Montgomery M.D. at [facility]   • FLAP GRAFT  2/19/2014    Performed by Kolton Montgomery M.D. at [facility]   • PERINEAL PROCEDURE  12/18/2013    Performed by Kolton Montgomery M.D. at [facility]   • MYOCUTANEOUS FLAP  12/18/2013    Performed by Kolton Montgomery M.D. at [facility]   • IRRIGATION & DEBRIDEMENT GENERAL  10/23/2013    Performed by Kolton Montgomery M.D. at [facility]   • FISTULA IN ANO REPAIR  9/4/2013    Performed by Kolton Montgomery M.D. at [facility]   • FLAP ROTATION   "9/4/2013    Performed by Kolton Montgomery M.D. at SURGERY [facility]   • RECOVERY  8/26/2013    Performed by Cath-Recovery Surgery at SURGERY SAME DAY [facility]   • PROCTOSCOPY  7/17/2013    Performed by Kolton Montgomery M.D. at SURGERY [facility]   • BIOPSY GENERAL  7/17/2013    Performed by Kolton Montgomery M.D. at SURGERY [facility]   • SIGMOIDOSCOPY  2/27/2013    Performed by Kolton Montgomery M.D. at SURGERY [facility]   • FISTULA IN ANO REPAIR  2/27/2013    Performed by Kolton Montgomery M.D. at SURGERY [facility]   • LAPAROSCOPY  10/8/2012    Performed by Kolton Montgomery M.D. at [facility]   • ILEO LOOP DIVERSION  10/8/2012    Performed by Kolton Montgomery M.D. at SURGERY [facility]   • COLECTOMY  9/26/2012    Performed by Kolton Montgomery M.D. at SURGERY [facility]   • COLONOSCOPY FLEX W/ENDO US  9/20/2012    Performed by Harshil Bolton M.D. at SURGERY [facility]   • OTHER  2009    left eye torn retina repair   • CATARACT EXTRACTION WITH IOL  2004    bilateral   • ABDOMINAL HYSTERECTOMY TOTAL  1983   • CYST EXCISION  1972    ovarian   • COLON RESECTION     • EYE SURGERY      cataracts   • HYSTERECTOMY LAPAROSCOPY     • PB REMV 2ND CATARACT,CORN-SCLER SECTN     • TONSILLECTOMY       Allergies   Allergen Reactions   • Codeine      \"gets drunk\"   • Oxaliplatin Anaphylaxis   • Pcn [Penicillins] Itching   • Sulfa Drugs Itching   • Tape Rash     PAPER TAPE OK     Outpatient Encounter Prescriptions as of 11/6/2018   Medication Sig Dispense Refill   • isosorbide mononitrate SR (IMDUR) 30 MG TABLET SR 24 HR Take 1 Tab by mouth every morning. 30 Tab 3   • XARELTO 10 MG Tab tablet TAKE 1 TABLET EVERY DAY 30 Tab 6   • metronidazole (METROCREAM) 0.75 % cream Apply  to affected area(s) 2 times a day.     • raNITidine (ZANTAC) 150 MG Tab TAKE 1 TABLET 2 TIMES A DAY 60 Tab 3   • potassium chloride ER (KLOR-CON) 10 MEQ tablet TAKE ONE TABLET EVERY DAY AS NEEDED 30 Tab 6   • nitroglycerin " (NITROSTAT) 0.4 MG SL Tab Place 1 Tab under tongue as needed for Chest Pain. 25 Tab 0   • loperamide (IMODIUM) 2 MG Cap Take 2 mg by mouth 4 times a day as needed for Diarrhea.     • lidocaine-prilocaine (EMLA) 2.5-2.5 % Cream APPLY A THICK FILM OVER THE PORT ACCESS SITE PRIOR TO ACCESS 30 g 2   • Tiotropium Bromide-Olodaterol (STIOLTO RESPIMAT) 2.5-2.5 MCG/ACT Aero Soln Take 2 Puffs by mouth every day. 1 Inhaler 11   • albuterol (PROAIR HFA) 108 (90 Base) MCG/ACT Aero Soln inhalation aerosol Inhale 2 Puffs by mouth every four hours as needed for Shortness of Breath (wheezing). 1 Inhaler 11   • furosemide (LASIX) 40 MG Tab Take 1 Tab by mouth every day. 30 Tab 11   • ondansetron (ZOFRAN) 4 MG Tab tablet      • cyanocobalamin (VITAMIN B-12) 500 MCG Tab Take 500 mcg by mouth every day.     • Multiple Vitamins-Minerals (CENTRUM SILVER PO) Take  by mouth.     • Cholecalciferol (VITAMIN D3) 400 UNITS CAPS Take 1 Cap by mouth every day.     • loratadine (CLARITIN) 10 MG TABS Take 10 mg by mouth every day. Indications: Hayfever       No facility-administered encounter medications on file as of 2018.      @[facility]@  Social History     Social History   • Marital status:      Spouse name: N/A   • Number of children: N/A   • Years of education: N/A     Occupational History   • Not on file.     Social History Main Topics   • Smoking status: Never Smoker   • Smokeless tobacco: Never Used   • Alcohol use No      Comment: rare   • Drug use: No   • Sexual activity: No     Other Topics Concern   • Not on file     Social History Narrative   • No narrative on file      History   Smoking Status   • Never Smoker   Smokeless Tobacco   • Never Used     History   Alcohol Use No     Comment: rare     History   Drug Use No      OB History    Para Term  AB Living   2 2       2   SAB TAB Ectopic Molar Multiple Live Births                    # Outcome Date GA Lbr Gonzalo/2nd Weight Sex Delivery Anes PTL Lv   2 Para       Vag-Spont      1 Para      Vag-Spont            No LMP recorded (lmp unknown). Patient has had a hysterectomy.    G P A L     Family History   Problem Relation Age of Onset   • Heart Disease Mother    • Heart Failure Mother    • Hypertension Mother    • Hyperlipidemia Mother    • Cancer Mother    • Cancer Maternal Aunt 60        breast ca   • Lung Disease Brother         COPD/Emphysema/Smoker   • Cancer Brother         prostate cancer   • Alcohol/Drug Brother         Alcohol   • Kidney Disease Father         renal failure       Review of Systems   Respiratory: Positive for shortness of breath.    All other systems reviewed and are negative.       Objective:   LMP  (LMP Unknown)     Physical Exam   Constitutional: She is oriented to person, place, and time. She appears well-developed and well-nourished.   HENT:   Head: Normocephalic and atraumatic.   Eyes: Pupils are equal, round, and reactive to light. Conjunctivae are normal.   Neck: Normal range of motion. Neck supple.   Cardiovascular: Normal rate and regular rhythm.    Pulmonary/Chest: Effort normal and breath sounds normal.   Abdominal: Soft. Bowel sounds are normal.   Musculoskeletal: Normal range of motion.   Neurological: She is alert and oriented to person, place, and time.   Skin: Skin is warm and dry.   Psychiatric: She has a normal mood and affect. Her behavior is normal. Judgment and thought content normal.   Nursing note and vitals reviewed.      Assessment:   Labs:  Results for KANG HALL (MRN [id number]) as of 11/5/2018 15:37   Ref. Range 10/30/2018 07:15   WBC Latest Ref Range: 4.8 - 10.8 K/uL 4.3 (L)   RBC Latest Ref Range: 4.20 - 5.40 M/uL 4.54   Hemoglobin Latest Ref Range: 12.0 - 16.0 g/dL 12.8   Hematocrit Latest Ref Range: 37.0 - 47.0 % 40.5   MCV Latest Ref Range: 81.4 - 97.8 fL 89.2   MCH Latest Ref Range: 27.0 - 33.0 pg 28.2   MCHC Latest Ref Range: 33.6 - 35.0 g/dL 31.6 (L)   RDW Latest Ref Range: 35.9 - 50.0 fL 65.1 (H)   Platelet  Count Latest Ref Range: 164 - 446 K/uL 211   MPV Latest Ref Range: 9.0 - 12.9 fL 10.5   Neutrophils-Polys Latest Ref Range: 44.00 - 72.00 % 60.80   Neutrophils (Absolute) Latest Ref Range: 2.00 - 7.15 K/uL 2.61   Lymphocytes Latest Ref Range: 22.00 - 41.00 % 23.30   Lymphs (Absolute) Latest Ref Range: 1.00 - 4.80 K/uL 1.00   Monocytes Latest Ref Range: 0.00 - 13.40 % 7.50   Monos (Absolute) Latest Ref Range: 0.00 - 0.85 K/uL 0.32   Eosinophils Latest Ref Range: 0.00 - 6.90 % 6.70   Eos (Absolute) Latest Ref Range: 0.00 - 0.51 K/uL 0.29   Basophils Latest Ref Range: 0.00 - 1.80 % 1.70   Baso (Absolute) Latest Ref Range: 0.00 - 0.12 K/uL 0.07     Imaging:    Impression PET/CT 10/25/18         Hypermetabolic 2.5 cm lesion adjacent/to the right of the low attenuation treatment bed in the hepatic dome, in keeping with persistent residual disease next to the treatment bed.     Impression  CT 9/7/18        No definite new liver lesion identified.    Redemonstration of an oval low-attenuation area in the hepatic dome, measuring 2.1 x 3.4 cm, likely related to post treatment change.    The entire right hepatic lobe is very heterogeneous, similar to prior.         Pathology:  NA    Procedures:  NA    Medical Decision Making:  Today's Assessment / Status / Plan:     In light of the present findings, the patient has what appears to be a recurrence or a new met in the region of her dome.  The patient was given options including a repeat yttrium-90, interventional radiology guided microwave ablation, or surgical ablation.  The patient is chosen to go with a surgical ablation because she has had it done in the past.  She does have a PET scan of her heart on Thursday thus we will schedule it next week.  I have instructed her not to take any more chemotherapy next week.  We discussed the risks and benefits including bleeding, infection, the possibility of a bowel perforation due to her multiple scars and not being able to actually  complete the ablation under ultrasound guidance.  She has agreed to above plan and will schedule sometime next week.    1. Metastasis to liver (HCC)     2. Malignant neoplasm of rectum (HCC)         She agreed and verbalized her agreement and understanding with the current plan. I answered all questions and concerns she has at this time and advised her to call at any time in there interim with questions or concerns in regards to her care.    Thank you for allowing me to participate in her care, I will continue to follow closely.       Please note that this dictation was created using voice recognition software. I have made every reasonable attempt to correct obvious errors, but I expect that there are errors of grammar and possibly content that I did not discover before finalizing the note.     Thank you for this consultation and allowing me to participate in your patient's care. If I can be of further service please contact my office.

## 2018-11-06 ENCOUNTER — OFFICE VISIT (OUTPATIENT)
Dept: HEMATOLOGY ONCOLOGY | Facility: MEDICAL CENTER | Age: 78
End: 2018-11-06
Payer: MEDICARE

## 2018-11-06 ENCOUNTER — TELEPHONE (OUTPATIENT)
Dept: HEMATOLOGY ONCOLOGY | Facility: MEDICAL CENTER | Age: 78
End: 2018-11-06

## 2018-11-06 VITALS
OXYGEN SATURATION: 96 % | WEIGHT: 173.06 LBS | HEIGHT: 65 IN | HEART RATE: 94 BPM | SYSTOLIC BLOOD PRESSURE: 132 MMHG | DIASTOLIC BLOOD PRESSURE: 74 MMHG | TEMPERATURE: 97.1 F | BODY MASS INDEX: 28.83 KG/M2

## 2018-11-06 DIAGNOSIS — C20 MALIGNANT NEOPLASM OF RECTUM (HCC): ICD-10-CM

## 2018-11-06 DIAGNOSIS — C78.7 METASTASIS TO LIVER (HCC): ICD-10-CM

## 2018-11-06 PROCEDURE — 99214 OFFICE O/P EST MOD 30 MIN: CPT | Performed by: SURGERY

## 2018-11-06 ASSESSMENT — ENCOUNTER SYMPTOMS: SHORTNESS OF BREATH: 1

## 2018-11-06 NOTE — PATIENT INSTRUCTIONS
The patient will be scheduled for a laparoscopic microwave thermal ablation of the segment 8/7 dome lesion.

## 2018-11-06 NOTE — TELEPHONE ENCOUNTER
Patient walked in stated saw Dr. West wants to do an ablation possibly next week sometime and has asked for patient to hold off on chemotherapy for Tuesday the 11/13/2018. Patient has requested to keep all appointments for after the week of 11/12/2018. I let patient know that I will sent over the message to Dr. Hardy and his medical assistant. Patient agreed.

## 2018-11-08 DIAGNOSIS — Z01.812 PRE-OPERATIVE LABORATORY EXAMINATION: ICD-10-CM

## 2018-11-08 DIAGNOSIS — Z01.810 PRE-OPERATIVE CARDIOVASCULAR EXAMINATION: ICD-10-CM

## 2018-11-08 LAB
ALBUMIN SERPL BCP-MCNC: 4 G/DL (ref 3.2–4.9)
ALBUMIN/GLOB SERPL: 1.4 G/DL
ALP SERPL-CCNC: 168 U/L (ref 30–99)
ALT SERPL-CCNC: 26 U/L (ref 2–50)
ANION GAP SERPL CALC-SCNC: 9 MMOL/L (ref 0–11.9)
AST SERPL-CCNC: 29 U/L (ref 12–45)
BILIRUB SERPL-MCNC: 0.6 MG/DL (ref 0.1–1.5)
BUN SERPL-MCNC: 21 MG/DL (ref 8–22)
CALCIUM SERPL-MCNC: 9.4 MG/DL (ref 8.5–10.5)
CHLORIDE SERPL-SCNC: 106 MMOL/L (ref 96–112)
CO2 SERPL-SCNC: 23 MMOL/L (ref 20–33)
CREAT SERPL-MCNC: 0.99 MG/DL (ref 0.5–1.4)
EKG IMPRESSION: NORMAL
GLOBULIN SER CALC-MCNC: 2.8 G/DL (ref 1.9–3.5)
GLUCOSE SERPL-MCNC: 109 MG/DL (ref 65–99)
INR PPP: 1.22 (ref 0.87–1.13)
POTASSIUM SERPL-SCNC: 3.4 MMOL/L (ref 3.6–5.5)
PROT SERPL-MCNC: 6.8 G/DL (ref 6–8.2)
PROTHROMBIN TIME: 15.5 SEC (ref 12–14.6)
SODIUM SERPL-SCNC: 138 MMOL/L (ref 135–145)

## 2018-11-08 PROCEDURE — 93005 ELECTROCARDIOGRAM TRACING: CPT

## 2018-11-08 PROCEDURE — 85610 PROTHROMBIN TIME: CPT

## 2018-11-08 PROCEDURE — 36415 COLL VENOUS BLD VENIPUNCTURE: CPT

## 2018-11-08 PROCEDURE — 93010 ELECTROCARDIOGRAM REPORT: CPT | Performed by: INTERNAL MEDICINE

## 2018-11-08 PROCEDURE — 80053 COMPREHEN METABOLIC PANEL: CPT

## 2018-11-09 ENCOUNTER — APPOINTMENT (OUTPATIENT)
Dept: ONCOLOGY | Facility: MEDICAL CENTER | Age: 78
End: 2018-11-09
Attending: INTERNAL MEDICINE
Payer: MEDICARE

## 2018-11-09 ENCOUNTER — HOSPITAL ENCOUNTER (OUTPATIENT)
Dept: RADIOLOGY | Facility: MEDICAL CENTER | Age: 78
End: 2018-11-09
Attending: INTERNAL MEDICINE
Payer: MEDICARE

## 2018-11-09 DIAGNOSIS — I20.89 OTHER FORMS OF ANGINA PECTORIS: ICD-10-CM

## 2018-11-09 PROCEDURE — 78492 MYOCRD IMG PET MLT RST&STRS: CPT | Mod: 26 | Performed by: INTERNAL MEDICINE

## 2018-11-09 PROCEDURE — A9555 RB82 RUBIDIUM: HCPCS

## 2018-11-09 PROCEDURE — 93018 CV STRESS TEST I&R ONLY: CPT | Performed by: INTERNAL MEDICINE

## 2018-11-09 NOTE — PROCEDURES
REFERRING PHYSICIAN:  Shweta Ibrahim MD    AGE:  78    GENDER:  Female     HEIGHT:  65 inches    WEIGHT:  170 pounds      BMI:      INDICATIONS:  Chest pain.    MEDICATIONS:      PROCEDURE:  The patient reviewed and signed the acknowledgement for testing   form.  The patient was in a fasting state and was properly prepared for   testing.  An intravenous line was inserted and a flush of normal saline   followed to insure line patency.    A transmission scan was acquired for attenuation correction using the internal   Germanium sources.  The patient was then administered 25.0 mCi of   Rubidium-82.  Approximately 90 seconds after the infusion, resting imagine   were obtained with ECG-gating.  Following the resting series, the patient   administered 44.0 mg of dipyridamole over four minutes.  The blood pressure,   heart rate and ECG were monitored and recorded.  After the dipyridamole   infusion was completed, another transmission scan for attenuation correction   was obtained.  The patient was then administered 25.0 mCi of Rubidium-82.    Approximately 90 seconds after the infusion, Peak stress images were obtained   with ECG-gating.    CLINICAL RESPONSE:  Resting blood pressure was 139/91 mmHg with a heart rate   of 81 beats per minute.  Immediately post-dipyridamole infusion the blood   pressure was 99/67 mmHg with a heart rate of 96 beats per minute.  After a   recovery period the blood pressure was 101/70 mmHg with a heart rate of 106   beats per minute.    The patient experienced anxious sensation during testing.    Aminophylline 0 mg was administered following the scan.    ELECTROCARDIOGRAPHIC FINDINGS:  Rest EKG shows normal sinus rhythm with   nonspecific intraventricular conduction delay.  Stress EKG shows no   significant ST segment changes.  Occasional PVCs noted.    SCINTOGRAPHIC FINDINGS:  Rest and stress images show no significant   attenuation.    GATED WALL MOTION FINDINGS:  Resting ejection fraction 80%,  stress ejection   fraction 87%.  No segmental wall motion abnormalities noted.    CONCLUSIONS AND IMPRESSIONS:  1.  Normal cardiac PET scan.  No evidence of ischemia nor infarct.  2.  Normal left ventricular systolic function with resting ejection fraction   of 80% and stress ejection fraction of 87%.  No segmental wall motion   abnormalities noted.  3.  No significant ST segment changes noted.  Occasional premature ventricular   contractions noted.  4.  Chest pain was not experienced during the study.       ____________________________________     MD MELVIN TROTTER / RIKI    DD:  11/09/2018 11:08:05  DT:  11/09/2018 11:21:17    D#:  [id number]  Job#:  [id number]

## 2018-11-13 ENCOUNTER — APPOINTMENT (OUTPATIENT)
Dept: ONCOLOGY | Facility: MEDICAL CENTER | Age: 78
End: 2018-11-13
Attending: INTERNAL MEDICINE
Payer: MEDICARE

## 2018-11-13 DIAGNOSIS — Z86.711 HISTORY OF PULMONARY EMBOLISM: ICD-10-CM

## 2018-11-13 DIAGNOSIS — R60.0 LOCALIZED EDEMA: ICD-10-CM

## 2018-11-13 DIAGNOSIS — D64.9 ANEMIA, UNSPECIFIED TYPE: ICD-10-CM

## 2018-11-14 RX ORDER — FUROSEMIDE 40 MG/1
40 TABLET ORAL DAILY
Qty: 90 TAB | Refills: 3 | Status: SHIPPED | OUTPATIENT
Start: 2018-11-14 | End: 2019-06-18 | Stop reason: SDUPTHER

## 2018-11-15 ENCOUNTER — TELEPHONE (OUTPATIENT)
Dept: CARDIOLOGY | Facility: MEDICAL CENTER | Age: 78
End: 2018-11-15

## 2018-11-15 ENCOUNTER — APPOINTMENT (OUTPATIENT)
Dept: ONCOLOGY | Facility: MEDICAL CENTER | Age: 78
End: 2018-11-15
Attending: INTERNAL MEDICINE
Payer: MEDICARE

## 2018-11-15 ENCOUNTER — HOSPITAL ENCOUNTER (OUTPATIENT)
Facility: MEDICAL CENTER | Age: 78
End: 2018-11-15
Attending: SURGERY | Admitting: SURGERY
Payer: MEDICARE

## 2018-11-15 VITALS
BODY MASS INDEX: 28.98 KG/M2 | OXYGEN SATURATION: 95 % | HEART RATE: 70 BPM | WEIGHT: 173.94 LBS | RESPIRATION RATE: 16 BRPM | SYSTOLIC BLOOD PRESSURE: 136 MMHG | DIASTOLIC BLOOD PRESSURE: 71 MMHG | TEMPERATURE: 98.1 F | HEIGHT: 65 IN

## 2018-11-15 DIAGNOSIS — C78.7 SECONDARY MALIGNANT NEOPLASM OF LIVER (HCC): ICD-10-CM

## 2018-11-15 DIAGNOSIS — C20 RECTAL CANCER (HCC): ICD-10-CM

## 2018-11-15 PROCEDURE — 501570 HCHG TROCAR, SEPARATOR: Performed by: SURGERY

## 2018-11-15 PROCEDURE — 160025 RECOVERY II MINUTES (STATS): Performed by: SURGERY

## 2018-11-15 PROCEDURE — 160046 HCHG PACU - 1ST 60 MINS PHASE II: Performed by: SURGERY

## 2018-11-15 PROCEDURE — 501838 HCHG SUTURE GENERAL: Performed by: SURGERY

## 2018-11-15 PROCEDURE — 160009 HCHG ANES TIME/MIN: Performed by: SURGERY

## 2018-11-15 PROCEDURE — 501571 HCHG TROCAR, SEPARATOR 12X100: Performed by: SURGERY

## 2018-11-15 PROCEDURE — 502571 HCHG PACK, LAP CHOLE: Performed by: SURGERY

## 2018-11-15 PROCEDURE — 501445 HCHG STAPLER, SKIN DISP: Performed by: SURGERY

## 2018-11-15 PROCEDURE — 700101 HCHG RX REV CODE 250

## 2018-11-15 PROCEDURE — 700111 HCHG RX REV CODE 636 W/ 250 OVERRIDE (IP)

## 2018-11-15 PROCEDURE — 160036 HCHG PACU - EA ADDL 30 MINS PHASE I: Performed by: SURGERY

## 2018-11-15 PROCEDURE — 700111 HCHG RX REV CODE 636 W/ 250 OVERRIDE (IP): Performed by: ANESTHESIOLOGY

## 2018-11-15 PROCEDURE — 160041 HCHG SURGERY MINUTES - EA ADDL 1 MIN LEVEL 4: Performed by: SURGERY

## 2018-11-15 PROCEDURE — 500002 HCHG ADHESIVE, DERMABOND: Performed by: SURGERY

## 2018-11-15 PROCEDURE — 160029 HCHG SURGERY MINUTES - 1ST 30 MINS LEVEL 4: Performed by: SURGERY

## 2018-11-15 PROCEDURE — 160035 HCHG PACU - 1ST 60 MINS PHASE I: Performed by: SURGERY

## 2018-11-15 PROCEDURE — 160048 HCHG OR STATISTICAL LEVEL 1-5: Performed by: SURGERY

## 2018-11-15 PROCEDURE — 700102 HCHG RX REV CODE 250 W/ 637 OVERRIDE(OP): Performed by: ANESTHESIOLOGY

## 2018-11-15 PROCEDURE — A6402 STERILE GAUZE <= 16 SQ IN: HCPCS | Performed by: SURGERY

## 2018-11-15 PROCEDURE — 160002 HCHG RECOVERY MINUTES (STAT): Performed by: SURGERY

## 2018-11-15 PROCEDURE — 160047 HCHG PACU  - EA ADDL 30 MINS PHASE II: Performed by: SURGERY

## 2018-11-15 PROCEDURE — A9270 NON-COVERED ITEM OR SERVICE: HCPCS | Performed by: ANESTHESIOLOGY

## 2018-11-15 PROCEDURE — 700111 HCHG RX REV CODE 636 W/ 250 OVERRIDE (IP): Performed by: SURGERY

## 2018-11-15 PROCEDURE — 302087 SET,INFUSION NEEDLE 22X1 1/2": Performed by: SURGERY

## 2018-11-15 PROCEDURE — 306578 KIT,PORT ACCESS 20G X 1.5INCH: Performed by: SURGERY

## 2018-11-15 RX ORDER — TRAMADOL HYDROCHLORIDE 50 MG/1
50 TABLET ORAL EVERY 4 HOURS PRN
Qty: 30 TAB | Refills: 0 | Status: SHIPPED | OUTPATIENT
Start: 2018-11-15 | End: 2018-11-22

## 2018-11-15 RX ORDER — OXYCODONE HYDROCHLORIDE 5 MG/1
10 TABLET ORAL
Status: DISCONTINUED | OUTPATIENT
Start: 2018-11-15 | End: 2018-11-15 | Stop reason: HOSPADM

## 2018-11-15 RX ORDER — OXYCODONE HCL 5 MG/5 ML
5 SOLUTION, ORAL ORAL
Status: COMPLETED | OUTPATIENT
Start: 2018-11-15 | End: 2018-11-15

## 2018-11-15 RX ORDER — DIPHENHYDRAMINE HYDROCHLORIDE 50 MG/ML
12.5 INJECTION INTRAMUSCULAR; INTRAVENOUS
Status: DISCONTINUED | OUTPATIENT
Start: 2018-11-15 | End: 2018-11-15 | Stop reason: HOSPADM

## 2018-11-15 RX ORDER — LABETALOL HYDROCHLORIDE 5 MG/ML
INJECTION, SOLUTION INTRAVENOUS
Status: COMPLETED
Start: 2018-11-15 | End: 2018-11-15

## 2018-11-15 RX ORDER — BUPIVACAINE HYDROCHLORIDE AND EPINEPHRINE 5; 5 MG/ML; UG/ML
INJECTION, SOLUTION EPIDURAL; INTRACAUDAL; PERINEURAL
Status: DISCONTINUED | OUTPATIENT
Start: 2018-11-15 | End: 2018-11-15 | Stop reason: HOSPADM

## 2018-11-15 RX ORDER — MIDAZOLAM HYDROCHLORIDE 1 MG/ML
1 INJECTION INTRAMUSCULAR; INTRAVENOUS
Status: DISCONTINUED | OUTPATIENT
Start: 2018-11-15 | End: 2018-11-15 | Stop reason: HOSPADM

## 2018-11-15 RX ORDER — HALOPERIDOL 5 MG/ML
1 INJECTION INTRAMUSCULAR
Status: DISCONTINUED | OUTPATIENT
Start: 2018-11-15 | End: 2018-11-15 | Stop reason: HOSPADM

## 2018-11-15 RX ORDER — PROMETHAZINE HYDROCHLORIDE 25 MG/1
12.5 TABLET ORAL EVERY 6 HOURS PRN
Qty: 30 TAB | Refills: 0 | Status: SHIPPED | OUTPATIENT
Start: 2018-11-15 | End: 2019-01-09

## 2018-11-15 RX ORDER — HYDRALAZINE HYDROCHLORIDE 20 MG/ML
5 INJECTION INTRAMUSCULAR; INTRAVENOUS
Status: DISCONTINUED | OUTPATIENT
Start: 2018-11-15 | End: 2018-11-15 | Stop reason: HOSPADM

## 2018-11-15 RX ORDER — ONDANSETRON 2 MG/ML
4 INJECTION INTRAMUSCULAR; INTRAVENOUS
Status: DISCONTINUED | OUTPATIENT
Start: 2018-11-15 | End: 2018-11-15 | Stop reason: HOSPADM

## 2018-11-15 RX ORDER — HYDROMORPHONE HYDROCHLORIDE 1 MG/ML
0.4 INJECTION, SOLUTION INTRAMUSCULAR; INTRAVENOUS; SUBCUTANEOUS
Status: DISCONTINUED | OUTPATIENT
Start: 2018-11-15 | End: 2018-11-15 | Stop reason: HOSPADM

## 2018-11-15 RX ORDER — LIDOCAINE AND PRILOCAINE 25; 25 MG/G; MG/G
CREAM TOPICAL
Status: COMPLETED
Start: 2018-11-15 | End: 2018-11-15

## 2018-11-15 RX ORDER — LABETALOL HYDROCHLORIDE 5 MG/ML
5 INJECTION, SOLUTION INTRAVENOUS
Status: DISCONTINUED | OUTPATIENT
Start: 2018-11-15 | End: 2018-11-15 | Stop reason: HOSPADM

## 2018-11-15 RX ORDER — OXYCODONE HCL 5 MG/5 ML
10 SOLUTION, ORAL ORAL
Status: COMPLETED | OUTPATIENT
Start: 2018-11-15 | End: 2018-11-15

## 2018-11-15 RX ORDER — MEPERIDINE HYDROCHLORIDE 25 MG/ML
12.5 INJECTION INTRAMUSCULAR; INTRAVENOUS; SUBCUTANEOUS
Status: DISCONTINUED | OUTPATIENT
Start: 2018-11-15 | End: 2018-11-15 | Stop reason: HOSPADM

## 2018-11-15 RX ORDER — HYDROMORPHONE HYDROCHLORIDE 1 MG/ML
0.2 INJECTION, SOLUTION INTRAMUSCULAR; INTRAVENOUS; SUBCUTANEOUS
Status: DISCONTINUED | OUTPATIENT
Start: 2018-11-15 | End: 2018-11-15 | Stop reason: HOSPADM

## 2018-11-15 RX ORDER — OXYCODONE HYDROCHLORIDE 5 MG/1
5 TABLET ORAL
Status: DISCONTINUED | OUTPATIENT
Start: 2018-11-15 | End: 2018-11-15 | Stop reason: HOSPADM

## 2018-11-15 RX ORDER — SODIUM CHLORIDE, SODIUM LACTATE, POTASSIUM CHLORIDE, CALCIUM CHLORIDE 600; 310; 30; 20 MG/100ML; MG/100ML; MG/100ML; MG/100ML
INJECTION, SOLUTION INTRAVENOUS ONCE
Status: DISCONTINUED | OUTPATIENT
Start: 2018-11-15 | End: 2018-11-15 | Stop reason: HOSPADM

## 2018-11-15 RX ORDER — SODIUM CHLORIDE, SODIUM LACTATE, POTASSIUM CHLORIDE, CALCIUM CHLORIDE 600; 310; 30; 20 MG/100ML; MG/100ML; MG/100ML; MG/100ML
INJECTION, SOLUTION INTRAVENOUS CONTINUOUS
Status: DISCONTINUED | OUTPATIENT
Start: 2018-11-15 | End: 2018-11-15 | Stop reason: HOSPADM

## 2018-11-15 RX ORDER — HYDROMORPHONE HYDROCHLORIDE 1 MG/ML
0.1 INJECTION, SOLUTION INTRAMUSCULAR; INTRAVENOUS; SUBCUTANEOUS
Status: DISCONTINUED | OUTPATIENT
Start: 2018-11-15 | End: 2018-11-15 | Stop reason: HOSPADM

## 2018-11-15 RX ADMIN — HYDRALAZINE HYDROCHLORIDE 5 MG: 20 INJECTION INTRAMUSCULAR; INTRAVENOUS at 14:03

## 2018-11-15 RX ADMIN — HYDRALAZINE HYDROCHLORIDE 5 MG: 20 INJECTION INTRAMUSCULAR; INTRAVENOUS at 13:41

## 2018-11-15 RX ADMIN — OXYCODONE HYDROCHLORIDE 5 MG: 5 SOLUTION ORAL at 13:50

## 2018-11-15 RX ADMIN — LABETALOL HYDROCHLORIDE 5 MG: 5 INJECTION, SOLUTION INTRAVENOUS at 12:48

## 2018-11-15 RX ADMIN — FENTANYL CITRATE 50 MCG: 50 INJECTION, SOLUTION INTRAMUSCULAR; INTRAVENOUS at 13:44

## 2018-11-15 RX ADMIN — HYDRALAZINE HYDROCHLORIDE 5 MG: 20 INJECTION INTRAMUSCULAR; INTRAVENOUS at 13:30

## 2018-11-15 RX ADMIN — LABETALOL HYDROCHLORIDE 5 MG: 5 INJECTION, SOLUTION INTRAVENOUS at 13:18

## 2018-11-15 RX ADMIN — LABETALOL HYDROCHLORIDE 5 MG: 5 INJECTION, SOLUTION INTRAVENOUS at 12:57

## 2018-11-15 RX ADMIN — HEPARIN 500 UNITS: 100 SYRINGE at 17:06

## 2018-11-15 RX ADMIN — FENTANYL CITRATE 50 MCG: 50 INJECTION, SOLUTION INTRAMUSCULAR; INTRAVENOUS at 13:06

## 2018-11-15 RX ADMIN — LIDOCAINE AND PRILOCAINE 1 TUBE: 25; 25 CREAM TOPICAL at 09:35

## 2018-11-15 ASSESSMENT — PAIN SCALES - GENERAL
PAINLEVEL_OUTOF10: 8
PAINLEVEL_OUTOF10: 4
PAINLEVEL_OUTOF10: 0
PAINLEVEL_OUTOF10: 6
PAINLEVEL_OUTOF10: 4
PAINLEVEL_OUTOF10: 4

## 2018-11-15 NOTE — OP REPORT
DATE OF SERVICE:  11/15/2018    INDICATIONS:  Patient is a 78-year-old female patient, known to me, who is   status post ablation of a liver metastasis almost 3 years ago, followed by   yttrium-90, who unfortunately over her most recent surveillance imaging and   PET scan was found to have what appears to be recurrence in the posterior   lateral aspect of segment 7 near the ablation cavity.  After a long discussion   with patient given risks, benefits, and alternatives, she elected to go ahead   and proceed with an attempt at ablation again.    SURGEON:  Kit West MD    ASSISTANT SURGEON:  Dennis Rossi MD    ANESTHESIOLOGIST:  Atul Ballard MD    ANESTHESIA:  General and local anesthetic.    ESTIMATED BLOOD LOSS:  Less than 2 mL    COMPLICATIONS:  None.    FINDINGS:  Overlapping ablations to segment 7 of the liver x3, each being 4   minute at 140 yanes giving us a 4x5 cm ablation cavity overlapping the region   of the lateral posterior aspect and inferior aspect of the previous ablation   cavity.    PREOPERATIVE DIAGNOSIS:  Metastatic rectal cancer.    POSTOPERATIVE DIAGNOSIS:  Metastatic rectal cancer.    DESCRIPTION OF PROCEDURE:  Patient was brought to OR, placed under general   anesthetic.  After having both arms out, she had her ileostomy bag taken down.    Her ileostomy was closed with a 2-0 silk suture, closing ostomy.  It was   cleaned with saline and then she was prepped sterilely.  She was covered with   sterile drapes, given preoperative antibiotics.  Timeout was adequate.  At   that point, we placed a towel over her ostomy and then using towels throughout   and Ioban, we secured the space where we were going to work away from the   ostomy.  At that point, she was given 5 mL of 0.5% Marcaine with epinephrine   in the right lateral aspect of her umbilicus.  An incision was made with 11   blade and Bovie electrocautery.  Between 2 Kochers and Leatha, the abdomen was   opened under  direct visualization atraumatically.  0 Vicryl suture was placed   on each side of the fascia.  Bubba trocar was inserted.  Abdomen was   insufflated to 15 mm of pressure.  She was placed in reverse Trendelenburg,   left side down, exposing the right upper quadrant.  We elected the right upper   quadrant.  It appeared that the patient showed minimal to no adhesions except   for the area of the previous ablation was adhered to her diaphragm and it   elevated the liver, which was of benefit for us.  We then did an   intraoperative ultrasound on the area.  There was definitely a demarcation of   the area that underwent yttrium-90, but under ultrasound guidance, we could   identify a more solid phase in that location that was identified on PET scan.    We then placed a laparoscopic microwave thermal ablation probe in right upper   quadrant stab wound after given local anesthetic and placed it deep to our   position laterally and inferiorly in the previous ablation cavity under   ultrasound guidance.  We ran a 4-minute 140 watt ablation cavity giving us a   4x5 cm.  We then pulled back 3 cm and did a second overlapping 140 at 4   minutes giving us a 4x5 cm ablation cavity.  We then repositioned the needle   slightly cephalad and laterally and did a third 140 yanes at 4 minutes giving   us a 4x5 cm ablation cavity.  Once completed, we reimaged and had difficulty   identifying any solid tumor.  At that point, the operation was completed.  She   was placed in supine position, desufflated.  We waited approximately 5   minutes by the clock and reinsufflated, reinspected our port sites and found   no sign of bleeding or bile leak.  At that point, the operation was completed.    We removed all the trocars, desufflated the abdomen, tied the preplaced 0   Vicryl suture in the umbilicus, gave a total of 30 mL of Marcaine with   epinephrine throughout all 3 sites and then used a 4-0 Monocryl to close the   skin followed by  Dermabond.  Once that was dry, we then took off our sterile   dressings exposing the ostomy.  We trimmed the ostomy bags to fit around the   umbilical port site placing benzoin, adhered the plate, followed by the bag   after removing the closure suture for the ostomy was wide open and patent.  At   that point, operation was completed.  Patient tolerated the procedure well,   extubated, and transferred to recovery.       ____________________________________     MD COLUMBA Rodney / NTS    DD:  11/15/2018 12:48:35  DT:  11/15/2018 13:43:59    D#:  [id number]  Job#:  [id number]    cc: Dennis Rossi MD

## 2018-11-15 NOTE — OR NURSING
The pt is awake and oriented. Respirations are regular and easy. Pain is controlled, the pt is comfortable. 3 derma bonded sites on abdomen dry and intact.BP controlled.

## 2018-11-15 NOTE — OR NURSING
"Call placed into OR before preop op process started. Called on patient's behalf to ask if the port can be accessed. Per OR RN, Dr. West said port can be accessed in preop by, \"an experienced RN.\" No mention was made of requiring an infusion center RN to access.    Order placed for infusion set with 20 g 1.5 inch needle. Called supply to obtain set. Tube station down for greater than 5 minutes. To decrease delay, I walked down to supply to personally  set. Obtained two sets in case.    Immediately after, attempted to access port with 20 g 1.5 needle set with preop RN who was experienced with port access. Unsuccessful attempt. Patient understood complication and stated she is a hard access and deep port. Right after this attempt I called into OR to inform them of lack of IV access or port access. OR RN said she would update Dr. Ballard and they were headed right out with the last case.    POC reviewed with patient and . Instructed to call for any needs. Call light in reach.   "

## 2018-11-15 NOTE — TELEPHONE ENCOUNTER
----- Message from Shweta Ibrahim M.D. sent at 11/15/2018  2:32 PM PST -----  Reviewed PET study. No reversible defects noted.   No changes for now  Thank you  AA

## 2018-11-16 NOTE — OR NURSING
D/C instructions given to pt and family, verbalized understanding. Port hep locked and de-accesed per protocol.

## 2018-11-16 NOTE — OR NURSING
Notified Dea PERES about call I had with Karlene Romeo and that she was upset that not more nurses were able to access ports in preop.  Offered to have my supervisor/ her and  She refused.

## 2018-11-26 DIAGNOSIS — J43.9 PULMONARY EMPHYSEMA, UNSPECIFIED EMPHYSEMA TYPE (HCC): ICD-10-CM

## 2018-11-26 RX ORDER — ALBUTEROL SULFATE 90 UG/1
2 AEROSOL, METERED RESPIRATORY (INHALATION) EVERY 4 HOURS PRN
Qty: 1 INHALER | Refills: 11 | Status: SHIPPED | OUTPATIENT
Start: 2018-11-26 | End: 2019-12-16 | Stop reason: SDUPTHER

## 2018-11-26 NOTE — TELEPHONE ENCOUNTER
Have we ever prescribed this med? Yes.  If yes, what date? 10/13/17    Last OV: 7/11/18- [facility]    Next OV: 1/9/19    DX: Pulmonary emphysema     Medications: Albuterol hfa, Stiolto

## 2018-11-27 ENCOUNTER — APPOINTMENT (OUTPATIENT)
Dept: ONCOLOGY | Facility: MEDICAL CENTER | Age: 78
End: 2018-11-27
Attending: INTERNAL MEDICINE
Payer: MEDICARE

## 2018-11-27 ENCOUNTER — APPOINTMENT (OUTPATIENT)
Dept: HEMATOLOGY ONCOLOGY | Facility: MEDICAL CENTER | Age: 78
End: 2018-11-27
Payer: MEDICARE

## 2018-11-27 DIAGNOSIS — C20 RECTAL CANCER (HCC): ICD-10-CM

## 2018-11-27 DIAGNOSIS — C78.7 SECONDARY LIVER CANCER (HCC): ICD-10-CM

## 2018-11-29 ENCOUNTER — TELEPHONE (OUTPATIENT)
Dept: PULMONOLOGY | Facility: HOSPICE | Age: 78
End: 2018-11-29

## 2018-11-29 NOTE — TELEPHONE ENCOUNTER
MEDICATION PRIOR AUTHORIZATION NEEDED:    1. Name of Medication: Stiolto Respimat 2.5-2.5 mcg    2. Requested By (Name of Pharmacy): [facility]     3. Is insurance on file current? yes    4. What is the name & phone number of the 3rd party payor? [facility] [phone]

## 2018-12-04 NOTE — TELEPHONE ENCOUNTER
DOCUMENTATION OF PRIOR AUTH STATUS    1. Medication name and dose: Stiolto Respimat 2.5/2.5 mcg    2. Name and Phone # of Prescription coverage company: [employer] [phone]    3. Date Prior Auth was submitted: 11/29/2018    4. What information was given to obtain insurance decision: Clinical notes    5. Prior Auth letter Approved or Denied: Approved until further notice    6. Pharmacy notified: Yes    7. Patient notified: Yes

## 2018-12-06 RX ORDER — ONDANSETRON 8 MG/1
8 TABLET, ORALLY DISINTEGRATING ORAL
Status: CANCELLED | OUTPATIENT
Start: 2018-12-11

## 2018-12-06 RX ORDER — LIDOCAINE HYDROCHLORIDE 10 MG/ML
10 INJECTION, SOLUTION INFILTRATION; PERINEURAL ONCE
Status: CANCELLED | OUTPATIENT
Start: 2018-12-11 | End: 2018-12-11

## 2018-12-06 RX ORDER — ONDANSETRON 2 MG/ML
4 INJECTION INTRAMUSCULAR; INTRAVENOUS
Status: CANCELLED | OUTPATIENT
Start: 2018-12-11

## 2018-12-06 RX ORDER — DEXAMETHASONE SODIUM PHOSPHATE 4 MG/ML
8 INJECTION, SOLUTION INTRA-ARTICULAR; INTRALESIONAL; INTRAMUSCULAR; INTRAVENOUS; SOFT TISSUE ONCE
Status: CANCELLED | OUTPATIENT
Start: 2018-12-11 | End: 2018-12-11

## 2018-12-06 RX ORDER — PROCHLORPERAZINE MALEATE 10 MG
10 TABLET ORAL EVERY 6 HOURS PRN
Status: CANCELLED | OUTPATIENT
Start: 2018-12-11

## 2018-12-06 RX ORDER — DEXTROSE MONOHYDRATE 50 MG/ML
INJECTION, SOLUTION INTRAVENOUS CONTINUOUS
Status: CANCELLED | OUTPATIENT
Start: 2018-12-11

## 2018-12-10 ENCOUNTER — HOSPITAL ENCOUNTER (OUTPATIENT)
Dept: RADIOLOGY | Facility: MEDICAL CENTER | Age: 78
End: 2018-12-10
Attending: SURGERY
Payer: MEDICARE

## 2018-12-10 ENCOUNTER — OFFICE VISIT (OUTPATIENT)
Dept: CARDIOLOGY | Facility: MEDICAL CENTER | Age: 78
End: 2018-12-10
Payer: MEDICARE

## 2018-12-10 ENCOUNTER — OUTPATIENT INFUSION SERVICES (OUTPATIENT)
Dept: ONCOLOGY | Facility: MEDICAL CENTER | Age: 78
End: 2018-12-10
Attending: INTERNAL MEDICINE
Payer: MEDICARE

## 2018-12-10 VITALS
TEMPERATURE: 97.5 F | WEIGHT: 168.43 LBS | HEART RATE: 111 BPM | RESPIRATION RATE: 18 BRPM | BODY MASS INDEX: 28.06 KG/M2 | SYSTOLIC BLOOD PRESSURE: 114 MMHG | OXYGEN SATURATION: 99 % | HEIGHT: 65 IN | DIASTOLIC BLOOD PRESSURE: 73 MMHG

## 2018-12-10 VITALS
OXYGEN SATURATION: 98 % | DIASTOLIC BLOOD PRESSURE: 74 MMHG | HEART RATE: 112 BPM | WEIGHT: 167 LBS | HEIGHT: 65 IN | SYSTOLIC BLOOD PRESSURE: 118 MMHG | BODY MASS INDEX: 27.82 KG/M2

## 2018-12-10 DIAGNOSIS — Z79.899 ENCOUNTER FOR LONG-TERM (CURRENT) USE OF HIGH-RISK MEDICATION: ICD-10-CM

## 2018-12-10 DIAGNOSIS — R07.9 CHEST PAIN, UNSPECIFIED TYPE: ICD-10-CM

## 2018-12-10 DIAGNOSIS — C20 RECTAL CANCER (HCC): ICD-10-CM

## 2018-12-10 DIAGNOSIS — R60.0 LOWER EXTREMITY EDEMA: ICD-10-CM

## 2018-12-10 DIAGNOSIS — T45.1X5A CHEMOTHERAPY-INDUCED NEUTROPENIA (HCC): ICD-10-CM

## 2018-12-10 DIAGNOSIS — D70.1 CHEMOTHERAPY-INDUCED NEUTROPENIA (HCC): ICD-10-CM

## 2018-12-10 DIAGNOSIS — R00.0 TACHYCARDIA: ICD-10-CM

## 2018-12-10 DIAGNOSIS — C78.7 SECONDARY LIVER CANCER (HCC): ICD-10-CM

## 2018-12-10 LAB
ALBUMIN SERPL BCP-MCNC: 3.6 G/DL (ref 3.2–4.9)
ALBUMIN/GLOB SERPL: 1.1 G/DL
ALP SERPL-CCNC: 198 U/L (ref 30–99)
ALT SERPL-CCNC: 27 U/L (ref 2–50)
ANION GAP SERPL CALC-SCNC: 8 MMOL/L (ref 0–11.9)
AST SERPL-CCNC: 30 U/L (ref 12–45)
BASOPHILS # BLD AUTO: 0.8 % (ref 0–1.8)
BASOPHILS # BLD: 0.06 K/UL (ref 0–0.12)
BILIRUB SERPL-MCNC: 0.5 MG/DL (ref 0.1–1.5)
BUN SERPL-MCNC: 15 MG/DL (ref 8–22)
CALCIUM SERPL-MCNC: 9.5 MG/DL (ref 8.5–10.5)
CEA SERPL-MCNC: 2 NG/ML (ref 0–3)
CHLORIDE SERPL-SCNC: 105 MMOL/L (ref 96–112)
CO2 SERPL-SCNC: 23 MMOL/L (ref 20–33)
CREAT SERPL-MCNC: 1.03 MG/DL (ref 0.5–1.4)
EOSINOPHIL # BLD AUTO: 0.21 K/UL (ref 0–0.51)
EOSINOPHIL NFR BLD: 3 % (ref 0–6.9)
ERYTHROCYTE [DISTWIDTH] IN BLOOD BY AUTOMATED COUNT: 59.6 FL (ref 35.9–50)
GLOBULIN SER CALC-MCNC: 3.2 G/DL (ref 1.9–3.5)
GLUCOSE SERPL-MCNC: 129 MG/DL (ref 65–99)
HCT VFR BLD AUTO: 39.2 % (ref 37–47)
HGB BLD-MCNC: 12.4 G/DL (ref 12–16)
IMM GRANULOCYTES # BLD AUTO: 0.03 K/UL (ref 0–0.11)
IMM GRANULOCYTES NFR BLD AUTO: 0.4 % (ref 0–0.9)
LYMPHOCYTES # BLD AUTO: 1.84 K/UL (ref 1–4.8)
LYMPHOCYTES NFR BLD: 26 % (ref 22–41)
MCH RBC QN AUTO: 27.7 PG (ref 27–33)
MCHC RBC AUTO-ENTMCNC: 31.6 G/DL (ref 33.6–35)
MCV RBC AUTO: 87.5 FL (ref 81.4–97.8)
MONOCYTES # BLD AUTO: 0.63 K/UL (ref 0–0.85)
MONOCYTES NFR BLD AUTO: 8.9 % (ref 0–13.4)
NEUTROPHILS # BLD AUTO: 4.31 K/UL (ref 2–7.15)
NEUTROPHILS NFR BLD: 60.9 % (ref 44–72)
NRBC # BLD AUTO: 0 K/UL
NRBC BLD-RTO: 0 /100 WBC
PLATELET # BLD AUTO: 299 K/UL (ref 164–446)
PMV BLD AUTO: 10.3 FL (ref 9–12.9)
POTASSIUM SERPL-SCNC: 3.8 MMOL/L (ref 3.6–5.5)
PROT SERPL-MCNC: 6.8 G/DL (ref 6–8.2)
RBC # BLD AUTO: 4.48 M/UL (ref 4.2–5.4)
SODIUM SERPL-SCNC: 136 MMOL/L (ref 135–145)
WBC # BLD AUTO: 7.1 K/UL (ref 4.8–10.8)

## 2018-12-10 PROCEDURE — 82378 CARCINOEMBRYONIC ANTIGEN: CPT

## 2018-12-10 PROCEDURE — 74160 CT ABDOMEN W/CONTRAST: CPT

## 2018-12-10 PROCEDURE — 85025 COMPLETE CBC W/AUTO DIFF WBC: CPT

## 2018-12-10 PROCEDURE — 99215 OFFICE O/P EST HI 40 MIN: CPT | Performed by: INTERNAL MEDICINE

## 2018-12-10 PROCEDURE — A4212 NON CORING NEEDLE OR STYLET: HCPCS

## 2018-12-10 PROCEDURE — 80053 COMPREHEN METABOLIC PANEL: CPT

## 2018-12-10 PROCEDURE — 700117 HCHG RX CONTRAST REV CODE 255: Performed by: SURGERY

## 2018-12-10 PROCEDURE — 700111 HCHG RX REV CODE 636 W/ 250 OVERRIDE (IP)

## 2018-12-10 PROCEDURE — 36591 DRAW BLOOD OFF VENOUS DEVICE: CPT

## 2018-12-10 RX ADMIN — HEPARIN 500 UNITS: 100 SYRINGE at 13:30

## 2018-12-10 RX ADMIN — IOHEXOL 100 ML: 350 INJECTION, SOLUTION INTRAVENOUS at 12:13

## 2018-12-10 ASSESSMENT — ENCOUNTER SYMPTOMS
SHORTNESS OF BREATH: 0
DIZZINESS: 0
LOSS OF CONSCIOUSNESS: 0
ORTHOPNEA: 0
DEPRESSION: 0
PALPITATIONS: 0
FALLS: 0
PND: 0
ABDOMINAL PAIN: 0

## 2018-12-10 ASSESSMENT — PAIN SCALES - GENERAL: PAINLEVEL_OUTOF10: 0

## 2018-12-10 NOTE — LETTER
[facility]   [address] [place] [address]  BERNIE Carroll [address]  Phone: [phone]  Fax: [phone]              Karlene Walters  1940    Encounter Date: 12/10/2018    Shweta Ibrahim M.D.          PROGRESS NOTE:    Subjective:   Karlene Walters is a 78-year-old female presenting to clinic for follow-up on chest discomfort.    Patient has rectal cancer metastatic to the liver and has been on 5-FU treatment since 2015.  She receives 5-FU every 2 weeks for 46 hours.  Today the patient reports that she has had chest discomfort ever since she was started on 5-FU, but only on the days that she removes the pump and the day after.    After her last appointment, her 5-FU therapy was stopped due to a liver ablation.  She is scheduled to start the 5-FU tomorrow.  She has not taken any of the isosorbide mononitrate given the above.    She gets occasional lower extremity edema.    Her main concern today is that her heart rate is in the 100s beats per minute all the time, when she is walking it is usually in the 120s beats per minute.    Past Medical History:   Diagnosis Date   • Arrhythmia     occasional   • Blood clotting disorder (HCC) 08/2015    bilat PE    • Blood transfusion 1957   • Breath shortness     no O2 with moderate exertion   • Cancer (HCC) 09/2012    rectal cancer,  Liver CA-2015   • CATARACT     removed b/l   • Chemotherapy-induced neutropenia (HCC) 9/28/2016   • Chickenpox    • Colon cancer (HCC)    • Dental disorder     dentures UPPERS AND LOWERS   • Elevated alkaline phosphatase level 11/15/2017   • Emphysema of lung (HCC)    • Fall    • [place] measles    • Heart murmur     as a child   • Hemorrhagic disorder (HCC)     on Xarelto    • Hypercholesteremia    • Hypertension     no meds currently    • Ileostomy in place (HCC)    • Indigestion    • Influenza    • Lightheadedness 9/17/2015   • Mumps    • Obstruction     ileostomy   • Other specified symptom  associated with female genital organs     HYSTERECTOMY 1993   • Pneumonia 05/2017    tx'd with antibx   • Pulmonary embolism (HCC)    • 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)    • Renal insufficiency 3/14/2016   • Routine general medical examination at a health care facility 3/14/2016    3/14/16   • Seasonal allergies    • Swelling of both ankles     Lasix PRN   • Tonsillitis      Past Surgical History:   Procedure Laterality Date   • HEPATIC ABLATION LAPAROSCOPIC  11/15/2018    Procedure: HEPATIC ABLATION LAPAROSCOPIC.- SEGMENT 7/8;  Surgeon: Kit West M.D.;  Location: SURGERY [facility];  Service: General   • COLONOSCOPY WITH BIOPSY  8/23/2015    Procedure: COLONOSCOPY WITH BIOPSY;  Surgeon: Wilbur Glasgow M.D.;  Location: ENDOSCOPY [facility];  Service:    • HEPATIC ABLATION LAPAROSCOPIC  7/6/2015    Procedure: HEPATIC ABLATION LAPAROSCOPIC.;  Surgeon: Kit West M.D.;  Location: SURGERY [facility];  Service:    • CATH PLACEMENT Left 7/6/2015    Procedure: CATH PLACEMENT CEPHALIC POWERPORT;  Surgeon: Kit West M.D.;  Location: SURGERY [facility];  Service:    • FISTULA IN ANO REPAIR  1/28/2015    Performed by Kolton Montgomery M.D. at SURGERY [facility]   • PERINEAL PROCEDURE  1/28/2015    Performed by Kolton Montgomery M.D. at [facility]   • FISTULA IN ANO REPAIR  10/15/2014    Performed by Kolton Montgomery M.D. at SURGERY [facility]   • PERINEAL PROCEDURE  10/15/2014    Performed by Kolton Montgomery M.D. at [facility]   • IRRIGATION & DEBRIDEMENT GENERAL  2/19/2014    Performed by Kolton Montgomery M.D. at [facility]   • FLAP GRAFT  2/19/2014    Performed by Kolton Montgomery M.D. at [facility]   • PERINEAL PROCEDURE  12/18/2013    Performed by Kolton Montgomery M.D. at [facility]   • MYOCUTANEOUS FLAP  12/18/2013    Performed by Kolton Montgomery M.D. at [facility]   • IRRIGATION  & DEBRIDEMENT GENERAL  10/23/2013    Performed by Kolton Montgomery M.D. at SURGERY [facility]   • FISTULA IN ANO REPAIR  9/4/2013    Performed by Kolton Montgomery M.D. at SURGERY [facility]   • FLAP ROTATION  9/4/2013    Performed by Kolton Montgomery M.D. at SURGERY [facility]   • RECOVERY  8/26/2013    Performed by [facility]Rec[facility] Surgery at [facility] SAME DAY [facility]   • BIOPSY GENERAL  7/17/2013    Performed by Kolton Montgomery M.D. at SURGERY [facility]   • PROCTOSCOPY  7/17/2013    Performed by oKlton Montgomery M.D. at SURGERY [facility]   • FISTULA IN ANO REPAIR  2/27/2013    Performed by Kolton Montgomery M.D. at SURGERY [facility]   • SIGMOIDOSCOPY  2/27/2013    Performed by Kolton Montgomery M.D. at SURGERY [facility]   • LAPAROSCOPY  10/8/2012    Performed by Kolton Montgomery M.D. at SURGERY [facility]   • ILEO LOOP DIVERSION  10/8/2012    Performed by Kolton Montgomery M.D. at SURGERY [facility]   • COLECTOMY  9/26/2012    Performed by Kolton Montgomery M.D. at SURGERY [facility]   • COLONOSCOPY FLEX W/ENDO US  9/20/2012    Performed by Harshil Bolton M.D. at SURGERY [facility]   • OTHER  2009    left eye torn retina repair   • CATARACT EXTRACTION WITH IOL  2004    bilateral   • ABDOMINAL HYSTERECTOMY TOTAL  1983   • CYST EXCISION  1972    ovarian   • COLON RESECTION     • EYE SURGERY      cataracts   • HYSTERECTOMY LAPAROSCOPY     • PB REMV 2ND CATARACT,CORN-SCLER SECTN     • TONSILLECTOMY       Family History   Problem Relation Age of Onset   • Heart Disease Mother    • Heart Failure Mother    • Hypertension Mother    • Hyperlipidemia Mother    • Cancer Mother    • Cancer Maternal Aunt 60        breast ca   • Lung Disease Brother         COPD/Emphysema/Smoker   • Cancer Brother         prostate cancer   • Alcohol/Drug Brother         Alcohol   • Kidney Disease Father         renal failure     Social History     Social History   • Marital status:      Spouse name: N/A   •  "Number of children: N/A   • Years of education: N/A     Occupational History   • Not on file.     Social History Main Topics   • Smoking status: Never Smoker   • Smokeless tobacco: Never Used   • Alcohol use No      Comment: rare   • Drug use: No   • Sexual activity: No     Other Topics Concern   • Not on file     Social History Narrative   • No narrative on file     Allergies   Allergen Reactions   • Oxaliplatin Anaphylaxis   • Codeine      \"gets drunk\"   • Pcn [Penicillins] Itching   • Sulfa Drugs Itching   • Tape Rash     PAPER TAPE OK     Outpatient Encounter Prescriptions as of 12/10/2018   Medication Sig Dispense Refill   • Tiotropium Bromide-Olodaterol (STIOLTO RESPIMAT) 2.5-2.5 MCG/ACT Aero Soln Take 2 Puffs by mouth every day. 1 Inhaler 11   • furosemide (LASIX) 40 MG Tab Take 1 Tab by mouth every day. 90 Tab 3   • raNITidine (ZANTAC) 150 MG Tab TAKE 1 TABLET 2 TIMES A DAY 60 Tab 3   • NON SPECIFIED by Intravenous route every 14 days. Chemo 5 FU     • potassium chloride ER (KLOR-CON) 10 MEQ tablet TAKE ONE TABLET EVERY DAY AS NEEDED 30 Tab 6   • lidocaine-prilocaine (EMLA) 2.5-2.5 % Cream APPLY A THICK FILM OVER THE PORT ACCESS SITE PRIOR TO ACCESS 30 g 2   • cyanocobalamin (VITAMIN B-12) 500 MCG Tab Take 500 mcg by mouth every day.     • Multiple Vitamins-Minerals (CENTRUM SILVER PO) Take 1 Tab by mouth every day.     • Cholecalciferol (VITAMIN D3) 400 UNITS CAPS Take 1 Cap by mouth every day.     • loratadine (CLARITIN) 10 MG TABS Take 10 mg by mouth every day. Indications: Hayfever     • albuterol (PROAIR HFA) 108 (90 Base) MCG/ACT Aero Soln inhalation aerosol Inhale 2 Puffs by mouth every four hours as needed for Shortness of Breath (wheezing). 1 Inhaler 11   • promethazine (PHENERGAN) 25 MG Tab Take 0.5 Tabs by mouth every 6 hours as needed. 30 Tab 0   • isosorbide mononitrate SR (IMDUR) 30 MG TABLET SR 24 HR Take 1 Tab by mouth every morning. (Patient taking differently: Take 30 mg by mouth. Take day " "of Chem and following day) 30 Tab 3   • metronidazole (METROCREAM) 0.75 % cream Apply 1 Each to affected area(s) 2 times a day.     • loperamide (IMODIUM) 2 MG Cap Take 2 mg by mouth 4 times a day as needed for Diarrhea.     • ondansetron (ZOFRAN) 4 MG Tab tablet Take 4 mg by mouth as needed.       No facility-administered encounter medications on file as of 12/10/2018.      Review of Systems   Constitutional: Negative for malaise/fatigue.   Respiratory: Negative for shortness of breath.    Cardiovascular: Positive for leg swelling. Negative for chest pain, palpitations, orthopnea and PND.   Gastrointestinal: Negative for abdominal pain.   Musculoskeletal: Negative for falls.   Neurological: Negative for dizziness and loss of consciousness.   Psychiatric/Behavioral: Negative for depression.   All other systems reviewed and are negative.       Objective:   /74 (BP Location: Left arm, Patient Position: Sitting, BP Cuff Size: Adult)   Pulse (!) 112   Ht 1.65 m (5' 4.96\")   Wt 75.8 kg (167 lb)   LMP  (LMP Unknown)   SpO2 98%   BMI 27.82 kg/m²      Physical Exam   Constitutional: She is oriented to person, place, and time. She appears well-developed and well-nourished. No distress.   HENT:   Head: Normocephalic and atraumatic.   Eyes: Conjunctivae are normal. No scleral icterus.   Neck: Normal range of motion. Neck supple.   Cardiovascular: Normal rate, regular rhythm and normal heart sounds.  Exam reveals no gallop and no friction rub.    No murmur heard.  Pulmonary/Chest: Effort normal and breath sounds normal. No respiratory distress. She has no wheezes. She has no rales.   Abdominal: Soft. She exhibits no distension. There is no tenderness.   Musculoskeletal: She exhibits no edema.   Neurological: She is alert and oriented to person, place, and time.   Skin: Skin is warm and dry. She is not diaphoretic.   Psychiatric: She has a normal mood and affect. Her behavior is normal.   Nursing note and vitals " reviewed.    Echocardiogram performed October 2017 showed normal LV systolic function.  No major valvular pathology noted.    Ziopatch monitor performed in April 2018 was reviewed and showed a normal sinus rhythm, average heart rate in the 70s beats per minute.  No sustained arrhythmias noted.    Cardiac PET performed in November 2018 showed no fixed or reversible defects.  Normal LV systolic function.    Labs performed in November 2018 were reviewed and showed potassium 3.4.  Creatinine 0.99.    Assessment:     1. Chest pain, unspecified type     2. Lower extremity edema     3. Tachycardia     4. Encounter for long-term (current) use of high-risk medication         Medical Decision Making:  Today's Assessment / Status / Plan:     Chest pain: Atypical in nature.  She underwent a cardiac PET recently which was low risk.  Possibly secondary to coronary vasospasm in the setting of 5-FU use however this is a very rare side effect.  Nonetheless patient has been started on nitrates to be taken on the day of and the day after her pump removal, the day of her symptoms.  She was to restart her chemotherapy tomorrow.  She will let us know if she has symptomatic relief from the nitrates.  She can also try taking the nitrates the day before, the day off and the day after the pump removal.    Lower extremity edema: Likely dependent in nature.  Patient's LV systolic function is preserved.  Continue Lasix as needed for now.  Renal function has been stable.    Tachycardia: At the time of the exam, her heart rate had normalized.  Her Ziopatch monitor performed earlier this year for the same reason did not show any significant findings.  For now no further workup.  Should her symptoms worsen, she will let us know.    Return to clinic in 2 months or earlier if needed.    Thank you for allowing me to participate in the care of this patient. Please do not hesitate to contact me with any questions.    Shweta Ibrahim,  MD  Cardiologist  [facility]      PLEASE NOTE: This dictation was created using voice recognition software.         Manan Quiñonez M.D.  [address] [place] [phone]  VIA In Basket

## 2018-12-10 NOTE — PROGRESS NOTES
Patient returns after her CT scan for Heparin flush. Plan of care discussed. Orange patient returning tomorrow morning for labs and chemo. CBC, CMP, and CEA obtained today to eliminate am appointment for labs. Flushed per protocol. Port remains accessed for treatment tomorrow. Discharged in good condition.

## 2018-12-10 NOTE — PROGRESS NOTES
"Pharmacy Chemotherapy Verification Note:    Patient Name: Karlene Walters      Dx: Colon CA        Cycle 72 (Roberts cycle 64) - therapy has been on hold for ablation procedure  Previous treatment: C71 = 10/30/18    Protocol: 5-FU+Leucovorin       IV over 2 hours on Day 1, followed by   IVP on Day 1, followed by    - Dose reduced by 20% to 320 mg/m² starting with Cycle 18 (Roberts #11) on 7/5/16 due to neutropenia    10/31/17: delete Leucovorin and 5-FU push d/t neutropenia per Dr. Hardy   Fluorouracil 1200 mg/m²  IV continuous infusion daily on Days 1 and 2 (2400 mg/m² IV over 46-48 hours) -    - Infusion Dose reduced by 20% starting with Cycle 18 (Roberts #11) on 7/5/16 due to neutropenia   -20% reduction (1920 mg/m2) to be continued starting C28 per C Alsop APRN   14 day cycles for 12 cycles (adjuvant) or until disease progression or unacceptable toxicity  NCCN Guidelines for Colon Cancer. V.2.2015.  Jay GROVER, et al. Eur J Cancer.1999;35(9):1343-7.      Allergies:  Codeine; Oxaliplatin; Pcn; Sulfa drugs; and Tape       /75   Pulse (!) 112   Temp 36.3 °C (97.3 °F) (Temporal)   Resp 18   Ht 1.65 m (5' 4.96\")   Wt 76.9 kg (169 lb 8.5 oz)   LMP  (LMP Unknown)   SpO2 98%   BMI 28.25 kg/m²  Body surface area is 1.88 meters squared.    12/10/18: CMP on even cycles  ANC~ 4310 Plt = 299k   Hgb = 12.4     SCr = 1.03mg/dL CrCl ~ 55mL/min   LFT's = WNL, except Ap = 198 TBili = 0.5            Fluorouracil (5-FU) 1920 mg/m² x 1.88m² = 3609.6mg              <5% difference, OK to treat with final dose = 3610mg    CIVI over 46 hours Via home infusion pump at 1.6ml/hr                   Nakia Arroyo, PharmD    "

## 2018-12-10 NOTE — PROGRESS NOTES
Subjective:   Karlene Walters is a 78-year-old female presenting to clinic for follow-up on chest discomfort.    Patient has rectal cancer metastatic to the liver and has been on 5-FU treatment since 2015.  She receives 5-FU every 2 weeks for 46 hours.  Today the patient reports that she has had chest discomfort ever since she was started on 5-FU, but only on the days that she removes the pump and the day after.    After her last appointment, her 5-FU therapy was stopped due to a liver ablation.  She is scheduled to start the 5-FU tomorrow.  She has not taken any of the isosorbide mononitrate given the above.    She gets occasional lower extremity edema.    Her main concern today is that her heart rate is in the 100s beats per minute all the time, when she is walking it is usually in the 120s beats per minute.    Past Medical History:   Diagnosis Date   • Arrhythmia     occasional   • Blood clotting disorder (HCC) 08/2015    bilat PE    • Blood transfusion 1957   • Breath shortness     no O2 with moderate exertion   • Cancer (HCC) 09/2012    rectal cancer,  Liver CA-2015   • CATARACT     removed b/l   • Chemotherapy-induced neutropenia (HCC) 9/28/2016   • Chickenpox    • Colon cancer (HCC)    • Dental disorder     dentures UPPERS AND LOWERS   • Elevated alkaline phosphatase level 11/15/2017   • Emphysema of lung (HCC)    • Fall    • Armenian measles    • Heart murmur     as a child   • Hemorrhagic disorder (HCC)     on Xarelto    • Hypercholesteremia    • Hypertension     no meds currently    • Ileostomy in place (HCC)    • Indigestion    • Influenza    • Lightheadedness 9/17/2015   • Mumps    • Obstruction     ileostomy   • Other specified symptom associated with female genital organs     HYSTERECTOMY 1993   • Pneumonia 05/2017    tx'd with antibx   • Pulmonary embolism (HCC)    • 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)    • Renal insufficiency 3/14/2016   • Routine general medical examination at a  [facility] 3/14/2016    3/14/16   • Seasonal allergies    • Swelling of both ankles     Lasix PRN   • Tonsillitis      Past Surgical History:   Procedure Laterality Date   • HEPATIC ABLATION LAPAROSCOPIC  11/15/2018    Procedure: HEPATIC ABLATION LAPAROSCOPIC.- SEGMENT 7/8;  Surgeon: Kit West M.D.;  Location: SURGERY [facility];  Service: General   • COLONOSCOPY WITH BIOPSY  8/23/2015    Procedure: COLONOSCOPY WITH BIOPSY;  Surgeon: Wilbur Glasgow M.D.;  Location: ENDOSCOPY [facility];  Service:    • HEPATIC ABLATION LAPAROSCOPIC  7/6/2015    Procedure: HEPATIC ABLATION LAPAROSCOPIC.;  Surgeon: Kit West M.D.;  Location: SURGERY [facility];  Service:    • CATH PLACEMENT Left 7/6/2015    Procedure: CATH PLACEMENT CEPHALIC POWERPORT;  Surgeon: Kit West M.D.;  Location: SURGERY [facility];  Service:    • FISTULA IN ANO REPAIR  1/28/2015    Performed by Kolton Montgomery M.D. at [facility]   • PERINEAL PROCEDURE  1/28/2015    Performed by Kolton Montgomery M.D. at [facility]   • FISTULA IN ANO REPAIR  10/15/2014    Performed by Kolton Montgomery M.D. at [facility]   • PERINEAL PROCEDURE  10/15/2014    Performed by Kolton Montgomery M.D. at [facility]   • IRRIGATION & DEBRIDEMENT GENERAL  2/19/2014    Performed by Kolton Montgomery M.D. at [facility]   • FLAP GRAFT  2/19/2014    Performed by Kolton Montgomery M.D. at [facility]   • PERINEAL PROCEDURE  12/18/2013    Performed by Kolton Montgomery M.D. at [facility]   • MYOCUTANEOUS FLAP  12/18/2013    Performed by Kolton Montgomery M.D. at [facility]   • IRRIGATION & DEBRIDEMENT GENERAL  10/23/2013    Performed by Kolton Montgomery M.D. at [facility]   • FISTULA IN ANO REPAIR  9/4/2013    Performed by Kolton Montgomery M.D. at [facility]   • FLAP ROTATION  9/4/2013    Performed by Kolton Montgomery M.D. at  SURGERY [facility]   • RECOVERY  8/26/2013    Performed by Cath-Recovery Surgery at SURGERY SAME DAY [facility]   • BIOPSY GENERAL  7/17/2013    Performed by Kolton Montgomery M.D. at SURGERY [facility]   • PROCTOSCOPY  7/17/2013    Performed by Kolton Montgomery M.D. at SURGERY [facility]   • FISTULA IN ANO REPAIR  2/27/2013    Performed by Kolton Montgomery M.D. at SURGERY [facility]   • SIGMOIDOSCOPY  2/27/2013    Performed by Kolton Montgomery M.D. at SURGERY [facility]   • LAPAROSCOPY  10/8/2012    Performed by Kolton Montgomery M.D. at SURGERY [facility]   • ILEO LOOP DIVERSION  10/8/2012    Performed by Kolton Montgomery M.D. at SURGERY [facility]   • COLECTOMY  9/26/2012    Performed by Kolton Montgomery M.D. at SURGERY [facility]   • COLONOSCOPY FLEX W/ENDO US  9/20/2012    Performed by Harshil Bolton M.D. at SURGERY [facility]   • OTHER  2009    left eye torn retina repair   • CATARACT EXTRACTION WITH IOL  2004    bilateral   • ABDOMINAL HYSTERECTOMY TOTAL  1983   • CYST EXCISION  1972    ovarian   • COLON RESECTION     • EYE SURGERY      cataracts   • HYSTERECTOMY LAPAROSCOPY     • PB REMV 2ND CATARACT,CORN-SCLER SECTN     • TONSILLECTOMY       Family History   Problem Relation Age of Onset   • Heart Disease Mother    • Heart Failure Mother    • Hypertension Mother    • Hyperlipidemia Mother    • Cancer Mother    • Cancer Maternal Aunt 60        breast ca   • Lung Disease Brother         COPD/Emphysema/Smoker   • Cancer Brother         prostate cancer   • Alcohol/Drug Brother         Alcohol   • Kidney Disease Father         renal failure     Social History     Social History   • Marital status:      Spouse name: N/A   • Number of children: N/A   • Years of education: N/A     Occupational History   • Not on file.     Social History Main Topics   • Smoking status: Never Smoker   • Smokeless tobacco: Never Used   • Alcohol use No      Comment: rare   • Drug use: No   • Sexual  "activity: No     Other Topics Concern   • Not on file     Social History Narrative   • No narrative on file     Allergies   Allergen Reactions   • Oxaliplatin Anaphylaxis   • Codeine      \"gets drunk\"   • Pcn [Penicillins] Itching   • Sulfa Drugs Itching   • Tape Rash     PAPER TAPE OK     Outpatient Encounter Prescriptions as of 12/10/2018   Medication Sig Dispense Refill   • Tiotropium Bromide-Olodaterol (STIOLTO RESPIMAT) 2.5-2.5 MCG/ACT Aero Soln Take 2 Puffs by mouth every day. 1 Inhaler 11   • furosemide (LASIX) 40 MG Tab Take 1 Tab by mouth every day. 90 Tab 3   • raNITidine (ZANTAC) 150 MG Tab TAKE 1 TABLET 2 TIMES A DAY 60 Tab 3   • NON SPECIFIED by Intravenous route every 14 days. Chemo 5 FU     • potassium chloride ER (KLOR-CON) 10 MEQ tablet TAKE ONE TABLET EVERY DAY AS NEEDED 30 Tab 6   • lidocaine-prilocaine (EMLA) 2.5-2.5 % Cream APPLY A THICK FILM OVER THE PORT ACCESS SITE PRIOR TO ACCESS 30 g 2   • cyanocobalamin (VITAMIN B-12) 500 MCG Tab Take 500 mcg by mouth every day.     • Multiple Vitamins-Minerals (CENTRUM SILVER PO) Take 1 Tab by mouth every day.     • Cholecalciferol (VITAMIN D3) 400 UNITS CAPS Take 1 Cap by mouth every day.     • loratadine (CLARITIN) 10 MG TABS Take 10 mg by mouth every day. Indications: Hayfever     • albuterol (PROAIR HFA) 108 (90 Base) MCG/ACT Aero Soln inhalation aerosol Inhale 2 Puffs by mouth every four hours as needed for Shortness of Breath (wheezing). 1 Inhaler 11   • promethazine (PHENERGAN) 25 MG Tab Take 0.5 Tabs by mouth every 6 hours as needed. 30 Tab 0   • isosorbide mononitrate SR (IMDUR) 30 MG TABLET SR 24 HR Take 1 Tab by mouth every morning. (Patient taking differently: Take 30 mg by mouth. Take day of Chem and following day) 30 Tab 3   • metronidazole (METROCREAM) 0.75 % cream Apply 1 Each to affected area(s) 2 times a day.     • loperamide (IMODIUM) 2 MG Cap Take 2 mg by mouth 4 times a day as needed for Diarrhea.     • ondansetron (ZOFRAN) 4 MG Tab " "tablet Take 4 mg by mouth as needed.       No facility-administered encounter medications on file as of 12/10/2018.      Review of Systems   Constitutional: Negative for malaise/fatigue.   Respiratory: Negative for shortness of breath.    Cardiovascular: Positive for leg swelling. Negative for chest pain, palpitations, orthopnea and PND.   Gastrointestinal: Negative for abdominal pain.   Musculoskeletal: Negative for falls.   Neurological: Negative for dizziness and loss of consciousness.   Psychiatric/Behavioral: Negative for depression.   All other systems reviewed and are negative.       Objective:   /74 (BP Location: Left arm, Patient Position: Sitting, BP Cuff Size: Adult)   Pulse (!) 112   Ht 1.65 m (5' 4.96\")   Wt 75.8 kg (167 lb)   LMP  (LMP Unknown)   SpO2 98%   BMI 27.82 kg/m²     Physical Exam   Constitutional: She is oriented to person, place, and time. She appears well-developed and well-nourished. No distress.   HENT:   Head: Normocephalic and atraumatic.   Eyes: Conjunctivae are normal. No scleral icterus.   Neck: Normal range of motion. Neck supple.   Cardiovascular: Normal rate, regular rhythm and normal heart sounds.  Exam reveals no gallop and no friction rub.    No murmur heard.  Pulmonary/Chest: Effort normal and breath sounds normal. No respiratory distress. She has no wheezes. She has no rales.   Abdominal: Soft. She exhibits no distension. There is no tenderness.   Musculoskeletal: She exhibits no edema.   Neurological: She is alert and oriented to person, place, and time.   Skin: Skin is warm and dry. She is not diaphoretic.   Psychiatric: She has a normal mood and affect. Her behavior is normal.   Nursing note and vitals reviewed.    Echocardiogram performed October 2017 showed normal LV systolic function.  No major valvular pathology noted.    Ziopatch monitor performed in April 2018 was reviewed and showed a normal sinus rhythm, average heart rate in the 70s beats per minute.  " No sustained arrhythmias noted.    Cardiac PET performed in November 2018 showed no fixed or reversible defects.  Normal LV systolic function.    Labs performed in November 2018 were reviewed and showed potassium 3.4.  Creatinine 0.99.    Assessment:     1. Chest pain, unspecified type     2. Lower extremity edema     3. Tachycardia     4. Encounter for long-term (current) use of high-risk medication         Medical Decision Making:  Today's Assessment / Status / Plan:     Chest pain: Atypical in nature.  She underwent a cardiac PET recently which was low risk.  Possibly secondary to coronary vasospasm in the setting of 5-FU use however this is a very rare side effect.  Nonetheless patient has been started on nitrates to be taken on the day of and the day after her pump removal, the day of her symptoms.  She was to restart her chemotherapy tomorrow.  She will let us know if she has symptomatic relief from the nitrates.  She can also try taking the nitrates the day before, the day off and the day after the pump removal.    Lower extremity edema: Likely dependent in nature.  Patient's LV systolic function is preserved.  Continue Lasix as needed for now.  Renal function has been stable.    Tachycardia: At the time of the exam, her heart rate had normalized.  Her Ziopatch monitor performed earlier this year for the same reason did not show any significant findings.  For now no further workup.  Should her symptoms worsen, she will let us know.    Return to clinic in 2 months or earlier if needed.    Thank you for allowing me to participate in the care of this patient. Please do not hesitate to contact me with any questions.    Shweta Ibrahim MD  Cardiologist  [facility]      PLEASE NOTE: This dictation was created using voice recognition software.

## 2018-12-10 NOTE — PROGRESS NOTES
Pt presents for port access prior to CT scan today with no new complaints.  Her last cycle was skipped due to ablation.  Port accessed using sterile technique with brisk blood return using power needle.  Port flushed and saline-locked for CT scan.  Pt chose to keep lab appointment for tomorrow morning prior to chemo.  Confirmed future appointments.  Pt stable and ambulatory at discharge.    Detail Level: Detailed Quality 110: Preventive Care And Screening: Influenza Immunization: Influenza immunization was not ordered or administered, reason not given

## 2018-12-11 ENCOUNTER — OFFICE VISIT (OUTPATIENT)
Dept: HEMATOLOGY ONCOLOGY | Facility: MEDICAL CENTER | Age: 78
End: 2018-12-11
Payer: MEDICARE

## 2018-12-11 ENCOUNTER — OUTPATIENT INFUSION SERVICES (OUTPATIENT)
Dept: ONCOLOGY | Facility: MEDICAL CENTER | Age: 78
End: 2018-12-11
Attending: INTERNAL MEDICINE
Payer: MEDICARE

## 2018-12-11 VITALS
OXYGEN SATURATION: 97 % | RESPIRATION RATE: 20 BRPM | WEIGHT: 169.64 LBS | TEMPERATURE: 97.5 F | HEIGHT: 65 IN | DIASTOLIC BLOOD PRESSURE: 64 MMHG | BODY MASS INDEX: 28.26 KG/M2 | SYSTOLIC BLOOD PRESSURE: 122 MMHG | HEART RATE: 110 BPM

## 2018-12-11 VITALS
TEMPERATURE: 97.3 F | BODY MASS INDEX: 28.25 KG/M2 | DIASTOLIC BLOOD PRESSURE: 75 MMHG | SYSTOLIC BLOOD PRESSURE: 128 MMHG | HEART RATE: 112 BPM | OXYGEN SATURATION: 98 % | RESPIRATION RATE: 18 BRPM | WEIGHT: 169.53 LBS | HEIGHT: 65 IN

## 2018-12-11 DIAGNOSIS — C20 RECTAL CANCER (HCC): ICD-10-CM

## 2018-12-11 DIAGNOSIS — R94.4 DECREASED GFR: ICD-10-CM

## 2018-12-11 DIAGNOSIS — C78.7 SECONDARY MALIGNANT NEOPLASM OF LIVER (HCC): ICD-10-CM

## 2018-12-11 PROCEDURE — 96375 TX/PRO/DX INJ NEW DRUG ADDON: CPT

## 2018-12-11 PROCEDURE — G0498 CHEMO EXTEND IV INFUS W/PUMP: HCPCS

## 2018-12-11 PROCEDURE — 700111 HCHG RX REV CODE 636 W/ 250 OVERRIDE (IP): Performed by: NURSE PRACTITIONER

## 2018-12-11 PROCEDURE — 99214 OFFICE O/P EST MOD 30 MIN: CPT | Performed by: NURSE PRACTITIONER

## 2018-12-11 PROCEDURE — 96374 THER/PROPH/DIAG INJ IV PUSH: CPT | Mod: XU

## 2018-12-11 RX ORDER — DEXAMETHASONE SODIUM PHOSPHATE 4 MG/ML
8 INJECTION, SOLUTION INTRA-ARTICULAR; INTRALESIONAL; INTRAMUSCULAR; INTRAVENOUS; SOFT TISSUE ONCE
Status: COMPLETED | OUTPATIENT
Start: 2018-12-11 | End: 2018-12-11

## 2018-12-11 RX ADMIN — DEXAMETHASONE SODIUM PHOSPHATE 8 MG: 4 INJECTION, SOLUTION INTRAMUSCULAR; INTRAVENOUS at 08:29

## 2018-12-11 RX ADMIN — FLUOROURACIL 3610 MG: 50 INJECTION, SOLUTION INTRAVENOUS at 09:11

## 2018-12-11 ASSESSMENT — ENCOUNTER SYMPTOMS
CHILLS: 0
DIZZINESS: 0
DIARRHEA: 0
WHEEZING: 0
CONSTIPATION: 0
FEVER: 0
ABDOMINAL PAIN: 1
HEADACHES: 0
NAUSEA: 0
SHORTNESS OF BREATH: 1
TINGLING: 0
COUGH: 0
WEIGHT LOSS: 0
VOMITING: 0
PALPITATIONS: 0

## 2018-12-11 ASSESSMENT — PAIN SCALES - GENERAL
PAINLEVEL_OUTOF10: 0
PAINLEVEL: NO PAIN

## 2018-12-11 NOTE — PROGRESS NOTES
Chemotherapy Verification - PRIMARY RN      Height = 64.96in  Weight = 76.9kg  BSA = 1.87m2       Medication: Fluorouracil  Dose: 1920mg/m2  Calculated Dose: 3597.1 mg over 46 hr                             (In mg/m2, AUC, mg/kg)     I confirm this process was performed independently with the BSA and all final chemotherapy dosing calculations congruent.  Any discrepancies of 5% or greater have been addressed with the chemotherapy pharmacist. The resolution of the discrepancy has been documented in the EPIC progress notes.

## 2018-12-11 NOTE — PROGRESS NOTES
"Subjective:      Karlene Walters is a 78 y.o. female who presents with Follow-Up (prechemo (Dr. Hardy)) for metastatic rectal cancer.         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  She presents unaccompanied for today's visit.  Patient is scheduled to receive cycle #64 of single agent 5-FU today.    She is status post surgical ablation with Dr. West on November 15 and doing well.  She stated she did have significant pain with the procedure and was told there was 3 spots he ablated.  She stated the pain is okay now and every once in a while she will notice a \"stitch\" in the right upper quadrant but it is tolerable.  She is scheduled to see surgeon next week in follow-up.  She most recently had a CT scan status post ablation that she will review and discuss with him next week.    Overall patient is doing well and ready to get back on treatment.  She does have some fatigue but it is very mild and very tolerable.  Unfortunately her  recently underwent surgery and is currently in the hospital as well.  She does have some shortness of breath with exertion and also due to the cold weather however it resolves fairly quickly.  She is followed with pulmonary.  She denies any nausea, vomiting, diarrhea or constipation.  Her output via her ostomy has been stable.  Patient also gets mid chest pain after chemotherapy.  She met with cardiology yesterday and they have started her on Imdur and recommended for her to take it the day she gets her pump removed and the day after to see if that might resolve some of her symptoms.  She also had a cardiac PET/CT completed as well.    Patient still with erythema noted to the hands which is chronic from her 5-FU.  Otherwise she is doing well and eager to get back on chemotherapy treatment.  I did review her CBC, CMP and CEA with patient today.    Allergies   Allergen Reactions   • Oxaliplatin Anaphylaxis   • Codeine      \"gets " "drunk\"   • Pcn [Penicillins] Itching   • Sulfa Drugs Itching   • Tape Rash     PAPER TAPE OK     Current Outpatient Prescriptions on File Prior to Visit   Medication Sig Dispense Refill   • rivaroxaban (XARELTO) 10 MG Tab tablet Take 10 mg by mouth.     • Tiotropium Bromide-Olodaterol (STIOLTO RESPIMAT) 2.5-2.5 MCG/ACT Aero Soln Take 2 Puffs by mouth every day. 1 Inhaler 11   • albuterol (PROAIR HFA) 108 (90 Base) MCG/ACT Aero Soln inhalation aerosol Inhale 2 Puffs by mouth every four hours as needed for Shortness of Breath (wheezing). 1 Inhaler 11   • promethazine (PHENERGAN) 25 MG Tab Take 0.5 Tabs by mouth every 6 hours as needed. 30 Tab 0   • furosemide (LASIX) 40 MG Tab Take 1 Tab by mouth every day. 90 Tab 3   • raNITidine (ZANTAC) 150 MG Tab TAKE 1 TABLET 2 TIMES A DAY 60 Tab 3   • NON SPECIFIED by Intravenous route every 14 days. Chemo 5 FU     • isosorbide mononitrate SR (IMDUR) 30 MG TABLET SR 24 HR Take 1 Tab by mouth every morning. (Patient taking differently: Take 30 mg by mouth. Take day of Chem and following day) 30 Tab 3   • metronidazole (METROCREAM) 0.75 % cream Apply 1 Each to affected area(s) 2 times a day.     • potassium chloride ER (KLOR-CON) 10 MEQ tablet TAKE ONE TABLET EVERY DAY AS NEEDED 30 Tab 6   • loperamide (IMODIUM) 2 MG Cap Take 2 mg by mouth 4 times a day as needed for Diarrhea.     • lidocaine-prilocaine (EMLA) 2.5-2.5 % Cream APPLY A THICK FILM OVER THE PORT ACCESS SITE PRIOR TO ACCESS 30 g 2   • ondansetron (ZOFRAN) 4 MG Tab tablet Take 4 mg by mouth as needed.     • cyanocobalamin (VITAMIN B-12) 500 MCG Tab Take 500 mcg by mouth every day.     • Multiple Vitamins-Minerals (CENTRUM SILVER PO) Take 1 Tab by mouth every day.     • Cholecalciferol (VITAMIN D3) 400 UNITS CAPS Take 1 Cap by mouth every day.     • loratadine (CLARITIN) 10 MG TABS Take 10 mg by mouth every day. Indications: Hayfever       Current Facility-Administered Medications on File Prior to Visit   Medication Dose " "Route Frequency Provider Last Rate Last Dose   • fluorouracil (ADRUCIL) 3,610 mg in CADD Cassette/Bag Chemo infusion (for use in CADD PUMP)  1,920 mg/m2 Intravenous Once Ella De A.P.N.           Review of Systems   Constitutional: Positive for malaise/fatigue (very mild and has a lot going on right now - very tolerable). Negative for chills, fever and weight loss.   Respiratory: Positive for shortness of breath (with exertion and cold weather). Negative for cough and wheezing.    Cardiovascular: Negative for chest pain and palpitations.   Gastrointestinal: Positive for abdominal pain (mild stitch pain in the RUQ area). Negative for constipation, diarrhea, nausea and vomiting.   Genitourinary: Negative for dysuria.   Skin: Negative for itching and rash.        Erythema noted to hands - chronic from 5FU   Neurological: Negative for dizziness, tingling and headaches.          Objective:     /64 (BP Location: Right arm, Patient Position: Sitting, BP Cuff Size: Adult)   Pulse (!) 110   Temp 36.4 °C (97.5 °F) (Temporal)   Resp 20   Ht 1.65 m (5' 4.96\")   Wt 76.9 kg (169 lb 10.3 oz)   LMP  (LMP Unknown)   SpO2 97%   Breastfeeding? Unknown   BMI 28.26 kg/m²      Physical Exam   Constitutional: She is oriented to person, place, and time. She appears well-developed and well-nourished. No distress.   HENT:   Head: Normocephalic and atraumatic.   Mouth/Throat: Oropharynx is clear and moist. No oropharyngeal exudate.   Cardiovascular: Regular rhythm, normal heart sounds and intact distal pulses.  Exam reveals no gallop and no friction rub.    No murmur heard.  Tachycardic d/t rushing to appointment today   Pulmonary/Chest: Effort normal and breath sounds normal. No respiratory distress. She has no wheezes.   SOB upon initial encounter with patient but then resolved   Abdominal: Soft. Bowel sounds are normal. She exhibits no distension. There is no tenderness.   Musculoskeletal: Normal range of motion. She " exhibits no edema or tenderness.   Neurological: She is alert and oriented to person, place, and time.   Skin: Skin is warm and dry. No rash noted. She is not diaphoretic. No erythema. No pallor.   Psychiatric: She has a normal mood and affect. Her behavior is normal.   Vitals reviewed.         No visits with results within 1 Day(s) from this visit.   Latest known visit with results is:   Outpatient Infusion Services on 12/10/2018   Component Date Value Ref Range Status   • Carcinoembryonic Antigen 12/10/2018 2.0  0.0 - 3.0 ng/mL Final    Comment: Effective September 17, 2013 the quantitative determination  of Carcinoembryonic Antigen (CEA) will now be performed at  [facility].  The Access CEA paramagnetic  particle chemiluminescent immunoassay is used.  Results  obtained with different test methods or kits cannot be used interchangeably.  Measurement of CEA has been shown to be  clinically relevant in the management of patients with  colorectal, breast, lung, prostatic, pancreatic, and ovarian  carcinomas.  Smokers may have slightly elevated levels of CEA.     • WBC 12/10/2018 7.1  4.8 - 10.8 K/uL Final   • RBC 12/10/2018 4.48  4.20 - 5.40 M/uL Final   • Hemoglobin 12/10/2018 12.4  12.0 - 16.0 g/dL Final   • Hematocrit 12/10/2018 39.2  37.0 - 47.0 % Final   • MCV 12/10/2018 87.5  81.4 - 97.8 fL Final   • MCH 12/10/2018 27.7  27.0 - 33.0 pg Final   • MCHC 12/10/2018 31.6* 33.6 - 35.0 g/dL Final   • RDW 12/10/2018 59.6* 35.9 - 50.0 fL Final   • Platelet Count 12/10/2018 299  164 - 446 K/uL Final   • MPV 12/10/2018 10.3  9.0 - 12.9 fL Final   • Neutrophils-Polys 12/10/2018 60.90  44.00 - 72.00 % Final   • Lymphocytes 12/10/2018 26.00  22.00 - 41.00 % Final   • Monocytes 12/10/2018 8.90  0.00 - 13.40 % Final   • Eosinophils 12/10/2018 3.00  0.00 - 6.90 % Final   • Basophils 12/10/2018 0.80  0.00 - 1.80 % Final   • Immature Granulocytes 12/10/2018 0.40  0.00 - 0.90 % Final   • Nucleated RBC 12/10/2018  0.00  /100 WBC Final   • Neutrophils (Absolute) 12/10/2018 4.31  2.00 - 7.15 K/uL Final    Includes immature neutrophils, if present.   • Lymphs (Absolute) 12/10/2018 1.84  1.00 - 4.80 K/uL Final   • Monos (Absolute) 12/10/2018 0.63  0.00 - 0.85 K/uL Final   • Eos (Absolute) 12/10/2018 0.21  0.00 - 0.51 K/uL Final   • Baso (Absolute) 12/10/2018 0.06  0.00 - 0.12 K/uL Final   • Immature Granulocytes (abs) 12/10/2018 0.03  0.00 - 0.11 K/uL Final   • NRBC (Absolute) 12/10/2018 0.00  K/uL Final   • Sodium 12/10/2018 136  135 - 145 mmol/L Final   • Potassium 12/10/2018 3.8  3.6 - 5.5 mmol/L Final   • Chloride 12/10/2018 105  96 - 112 mmol/L Final   • Co2 12/10/2018 23  20 - 33 mmol/L Final   • Anion Gap 12/10/2018 8.0  0.0 - 11.9 Final   • Glucose 12/10/2018 129* 65 - 99 mg/dL Final   • Bun 12/10/2018 15  8 - 22 mg/dL Final   • Calcium 12/10/2018 9.5  8.5 - 10.5 mg/dL Final   • AST(SGOT) 12/10/2018 30  12 - 45 U/L Final   • ALT(SGPT) 12/10/2018 27  2 - 50 U/L Final   • Alkaline Phosphatase 12/10/2018 198* 30 - 99 U/L Final   • Total Bilirubin 12/10/2018 0.5  0.1 - 1.5 mg/dL Final   • Albumin 12/10/2018 3.6  3.2 - 4.9 g/dL Final   • Total Protein 12/10/2018 6.8  6.0 - 8.2 g/dL Final   • Globulin 12/10/2018 3.2  1.9 - 3.5 g/dL Final   • A-G Ratio 12/10/2018 1.1  g/dL Final   • Creatinine 12/10/2018 1.03  0.50 - 1.40 mg/dL Final   • GFR If  12/10/2018 >60  >60 mL/min/1.73 m 2 Final   • GFR If Non  12/10/2018 52* >60 mL/min/1.73 m 2 Final          Assessment/Plan:     1. Rectal cancer (HCC)     2. Secondary malignant neoplasm of liver (HCC)       Plan  1.  Patient with metastatic rectal carcinoma to the liver currently on single agent 5-FU.  She recently underwent surgical ablation and per patient to 3 lesions within the liver.  That was completed on November 15, 2018 and she is doing well.  It has been approximately 4 weeks since her ablation and ready to get back on chemotherapy today.  I  did review her CBC, CMP and CEA and labs are appropriate to proceed with treatment today as planned.  Clinically she is very stable as well.  Of note patient CEA has trended down to 2, from 13.82 months ago.    2.  Patient to continue to follow-up with pulmonary and cardiology per their recommendations.  Patient also to follow-up with surgeon next week to discuss CT scan results and further plan of care.    3.  Patient will continue with single agent 5-FU every 2 weeks and follow-up in the clinic in 4 weeks or sooner if needed.

## 2018-12-11 NOTE — PROGRESS NOTES
"Pharmacy Chemotherapy Verification    Patient Name: Karlene Walters      Dx: Colon CA        Protocol: 5-FU + Leucovorin      *Dosing Reference*   IV over 2 hours on Day 1, followed by    IVP on Day 1, followed by         -- 10/31/17: delete Leucovorin and 5-FU push d/t neutropenia per Dr. Hardy   Fluorouracil   IV continuous infusion daily on Days 1 and 2 ( IV over 46-48 hours)        -- 20% reduction (1920 mg/m²) to be continued starting C28 per C Alsop APRN   14 day cycles for 12 cycles (adjuvant) or until disease progression or unacceptable toxicity  NCCN Guidelines for Colon Cancer. V.2.2015.  Jay T, et al. Eur J Cancer.1999;35(9):1343-7.      Allergies:  Codeine; Oxaliplatin; Pcn; Sulfa drugs; and Tape     /75   Pulse (!) 112   Temp 36.3 °C (97.3 °F) (Temporal)   Resp 18   Ht 1.65 m (5' 4.96\")   Wt 76.9 kg (169 lb 8.5 oz)   LMP  (LMP Unknown)   SpO2 98%   BMI 28.25 kg/m²  Body surface area is 1.88 meters squared.     Labs 12/10/18  ANC~4310 Plt = 299k   Hgb = 12.4     SCr = 1.03 mg/dL CrCl ~ 54 mL/min   AST/ALT/AP = 30/27/198 TBili = 0.5    Drug Order   (Drug name, dose, route, IV Fluid & volume, frequency, number of doses) Cycle 72 (C64 in [place]) - delayed due to liver ablation  Previous treatment: C71 October 30, 2018     Medication = Fluorouracil (5-FU)   Base Dose = 1920 mg/m²  Calc Dose:Base Dose x 1.88 m² = 3609.6 mg  Final Dose = 3610 mg   Route = CIVI  Drug Conc = 50 mg/mL  Fluid & Volume = 72.2 mL (+ 3 mL overfill = 75.2 mL)  Admin Duration = CIVI over 46 hrs @ 1.6 mL/hr   Via CADD pump for home infusion        <5% difference, OK to treat with final dose     By my signature below, I confirm this process was performed independently with the BSA and all final chemotherapy dosing calculations congruent. I have reviewed the above chemotherapy order and that my calculation of the final dose and BSA (when applicable) corroborate those calculations of the  pharmacist. "     Lora Hernandez, PharmD, BCOP

## 2018-12-11 NOTE — PROGRESS NOTES
Chemotherapy Verification - SECONDARY RN       Height = 165 cm  Weight = 76.9 kg  BSA = 1.877 m2       Medication: Home infusion 5FU  Dose: 1920 mg/m2  Calculated Dose: 3604.5 mg                             (In mg/m2, AUC, mg/kg)     I confirm that this process was performed independently.

## 2018-12-12 NOTE — PROGRESS NOTES
Patient arrived ambulatory to the [facility] for 5FU pump connect. Reviewed vital signs, labs, and physician order. Patient denies S&S of infection. Pt arrives with left chest port accessed on 12/10/18, visualized brisk blood return. Lab results reviewed, within limits to receive treatment. Pre-treatment medication administered. 5FU pump settings reviewed with Cori RN and pt. Visualized brisk blood return from left chest port. Confirmed upcoming appointment date and time with patient. Patient left the [facility] ambulatory in no sign of distress.

## 2018-12-13 ENCOUNTER — OUTPATIENT INFUSION SERVICES (OUTPATIENT)
Dept: ONCOLOGY | Facility: MEDICAL CENTER | Age: 78
End: 2018-12-13
Attending: INTERNAL MEDICINE
Payer: MEDICARE

## 2018-12-13 VITALS
HEART RATE: 102 BPM | RESPIRATION RATE: 18 BRPM | DIASTOLIC BLOOD PRESSURE: 66 MMHG | BODY MASS INDEX: 28.43 KG/M2 | HEIGHT: 65 IN | OXYGEN SATURATION: 95 % | WEIGHT: 170.64 LBS | TEMPERATURE: 97.1 F | SYSTOLIC BLOOD PRESSURE: 123 MMHG

## 2018-12-13 PROCEDURE — 96523 IRRIG DRUG DELIVERY DEVICE: CPT

## 2018-12-13 PROCEDURE — 700111 HCHG RX REV CODE 636 W/ 250 OVERRIDE (IP)

## 2018-12-13 RX ADMIN — HEPARIN 500 UNITS: 100 SYRINGE at 11:48

## 2018-12-13 ASSESSMENT — PAIN SCALES - GENERAL: PAINLEVEL_OUTOF10: 0

## 2018-12-13 NOTE — PROGRESS NOTES
Pt presents for CADD pump disconnect with no new complaints.  0ml remaining in pump with 73.4ml given.  Pump disconnected and port flushed and de-accessed per protocol; gauze and paper tape to site.  Pt stable and ambulatory at discharge.  Confirmed future appointments.

## 2018-12-17 NOTE — PROGRESS NOTES
Subjective:      Primary care physician:Manan Quiñonez M.D.  Medical Oncologist: Eric Hardy M.D.         Chief Complaint:   1. Secondary malignant neoplasm of liver (HCC)     2. Rectal cancer (HCC)         History of presenting illness:  Ms. Walters  is a pleasant 78 y.o. year old female who presented with her first postoperative visit following a laparoscopic ablation of a recurrent colon cancer met in the liver.  Her CT scan which I personally reviewed shows complete ablation of the area with no sign of recurrence.  The previous arterial enhancing area has resolved and there are no new lesions.  She is doing currently well and has no pain at this time.  She denies any fever or chills, nausea or vomiting.      Past Medical History:   Diagnosis Date   • Arrhythmia     occasional   • Blood clotting disorder (HCC) 08/2015    bilat PE    • Blood transfusion 1957   • Breath shortness     no O2 with moderate exertion   • Cancer (HCC) 09/2012    rectal cancer,  Liver CA-2015   • CATARACT     removed b/l   • Chemotherapy-induced neutropenia (HCC) 9/28/2016   • Chickenpox    • Colon cancer (HCC)    • Dental disorder     dentures UPPERS AND LOWERS   • Elevated alkaline phosphatase level 11/15/2017   • Emphysema of lung (HCC)    • Fall    • Nepali measles    • Heart murmur     as a child   • Hemorrhagic disorder (HCC)     on Xarelto    • Hypercholesteremia    • Hypertension     no meds currently    • Ileostomy in place (HCC)    • Indigestion    • Influenza    • Lightheadedness 9/17/2015   • Mumps    • Obstruction     ileostomy   • Other specified symptom associated with female genital organs     HYSTERECTOMY 1993   • Pneumonia 05/2017    tx'd with antibx   • Pulmonary embolism (HCC)    • 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)    • Renal insufficiency 3/14/2016   • Routine general medical examination at a health care facility 3/14/2016    3/14/16   • Seasonal allergies    • Swelling of both ankles     Lasix PRN    • Tonsillitis      Past Surgical History:   Procedure Laterality Date   • HEPATIC ABLATION LAPAROSCOPIC  11/15/2018    Procedure: HEPATIC ABLATION LAPAROSCOPIC.- SEGMENT 7/8;  Surgeon: Kit West M.D.;  Location: SURGERY [facility];  Service: General   • COLONOSCOPY WITH BIOPSY  8/23/2015    Procedure: COLONOSCOPY WITH BIOPSY;  Surgeon: Wilbur Glasgow M.D.;  Location: ENDOSCOPY [facility];  Service:    • HEPATIC ABLATION LAPAROSCOPIC  7/6/2015    Procedure: HEPATIC ABLATION LAPAROSCOPIC.;  Surgeon: Kit West M.D.;  Location: SURGERY [facility];  Service:    • CATH PLACEMENT Left 7/6/2015    Procedure: CATH PLACEMENT CEPHALIC POWERPORT;  Surgeon: Kit West M.D.;  Location: SURGERY [facility];  Service:    • FISTULA IN ANO REPAIR  1/28/2015    Performed by Kolton Montgomery M.D. at [facility]   • PERINEAL PROCEDURE  1/28/2015    Performed by Kolton Montgomery M.D. at [facility]   • FISTULA IN ANO REPAIR  10/15/2014    Performed by Kolton Montgomery M.D. at [facility]   • PERINEAL PROCEDURE  10/15/2014    Performed by Kolton Montgomery M.D. at [facility]   • IRRIGATION & DEBRIDEMENT GENERAL  2/19/2014    Performed by Kolton Montgomery M.D. at [facility]   • FLAP GRAFT  2/19/2014    Performed by Kolton Montgomery M.D. at [facility]   • PERINEAL PROCEDURE  12/18/2013    Performed by Kolton Montgomery M.D. at [facility]   • MYOCUTANEOUS FLAP  12/18/2013    Performed by Kolton Montgomery M.D. at [facility]   • IRRIGATION & DEBRIDEMENT GENERAL  10/23/2013    Performed by Kolton Montgomery M.D. at [facility]   • FISTULA IN ANO REPAIR  9/4/2013    Performed by Kolton Montgomery M.D. at [facility]   • FLAP ROTATION  9/4/2013    Performed by Kolton Montgomery M.D. at [facility]   • RECOVERY  8/26/2013    Performed by Cath-Recovery Surgery at SURGERY SAME DAY  "[facility] ORS   • BIOPSY GENERAL  7/17/2013    Performed by Kolton Montgomery M.D. at SURGERY [facility] ORS   • PROCTOSCOPY  7/17/2013    Performed by Kolton Montgomery M.D. at SURGERY [facility] ORS   • FISTULA IN ANO REPAIR  2/27/2013    Performed by Kolton Montgomery M.D. at SURGERY [facility] ORS   • SIGMOIDOSCOPY  2/27/2013    Performed by Kolton Montgomery M.D. at SURGERY [facility] ORS   • LAPAROSCOPY  10/8/2012    Performed by Kolton Montgomery M.D. at SURGERY [facility] ORS   • ILEO LOOP DIVERSION  10/8/2012    Performed by Kolton Montgomery M.D. at SURGERY [facility] ORS   • COLECTOMY  9/26/2012    Performed by Kolton Montgomery M.D. at [facility]   • COLONOSCOPY FLEX W/ENDO US  9/20/2012    Performed by Harshil Bolton M.D. at SURGERY [facility]   • OTHER  2009    left eye torn retina repair   • CATARACT EXTRACTION WITH IOL  2004    bilateral   • ABDOMINAL HYSTERECTOMY TOTAL  1983   • CYST EXCISION  1972    ovarian   • COLON RESECTION     • EYE SURGERY      cataracts   • HYSTERECTOMY LAPAROSCOPY     • PB REMV 2ND CATARACT,CORN-SCLER SECTN     • TONSILLECTOMY       Allergies   Allergen Reactions   • Oxaliplatin Anaphylaxis   • Codeine      \"gets drunk\"   • Pcn [Penicillins] Itching   • Sulfa Drugs Itching   • Tape Rash     PAPER TAPE OK     Outpatient Encounter Prescriptions as of 12/18/2018   Medication Sig Dispense Refill   • rivaroxaban (XARELTO) 10 MG Tab tablet Take 10 mg by mouth.     • Tiotropium Bromide-Olodaterol (STIOLTO RESPIMAT) 2.5-2.5 MCG/ACT Aero Soln Take 2 Puffs by mouth every day. 1 Inhaler 11   • albuterol (PROAIR HFA) 108 (90 Base) MCG/ACT Aero Soln inhalation aerosol Inhale 2 Puffs by mouth every four hours as needed for Shortness of Breath (wheezing). 1 Inhaler 11   • promethazine (PHENERGAN) 25 MG Tab Take 0.5 Tabs by mouth every 6 hours as needed. 30 Tab 0   • furosemide (LASIX) 40 MG Tab Take 1 Tab by mouth every day. 90 Tab 3   • raNITidine (ZANTAC) 150 MG Tab TAKE 1 TABLET 2 TIMES A " DAY 60 Tab 3   • NON SPECIFIED by Intravenous route every 14 days. Chemo 5 FU     • isosorbide mononitrate SR (IMDUR) 30 MG TABLET SR 24 HR Take 1 Tab by mouth every morning. (Patient taking differently: Take 30 mg by mouth. Take day of Chem and following day) 30 Tab 3   • metronidazole (METROCREAM) 0.75 % cream Apply 1 Each to affected area(s) 2 times a day.     • potassium chloride ER (KLOR-CON) 10 MEQ tablet TAKE ONE TABLET EVERY DAY AS NEEDED 30 Tab 6   • loperamide (IMODIUM) 2 MG Cap Take 2 mg by mouth 4 times a day as needed for Diarrhea.     • lidocaine-prilocaine (EMLA) 2.5-2.5 % Cream APPLY A THICK FILM OVER THE PORT ACCESS SITE PRIOR TO ACCESS 30 g 2   • ondansetron (ZOFRAN) 4 MG Tab tablet Take 4 mg by mouth as needed.     • cyanocobalamin (VITAMIN B-12) 500 MCG Tab Take 500 mcg by mouth every day.     • Multiple Vitamins-Minerals (CENTRUM SILVER PO) Take 1 Tab by mouth every day.     • Cholecalciferol (VITAMIN D3) 400 UNITS CAPS Take 1 Cap by mouth every day.     • loratadine (CLARITIN) 10 MG TABS Take 10 mg by mouth every day. Indications: Hayfever       No facility-administered encounter medications on file as of 2018.      Social History     Social History   • Marital status:      Spouse name: N/A   • Number of children: N/A   • Years of education: N/A     Occupational History   • Not on file.     Social History Main Topics   • Smoking status: Never Smoker   • Smokeless tobacco: Never Used   • Alcohol use No      Comment: rare   • Drug use: No   • Sexual activity: No     Other Topics Concern   • Not on file     Social History Narrative   • No narrative on file      History   Smoking Status   • Never Smoker   Smokeless Tobacco   • Never Used     History   Alcohol Use No     Comment: rare     History   Drug Use No      OB History    Para Term  AB Living   2 2       2   SAB TAB Ectopic Molar Multiple Live Births                    # Outcome Date GA Lbr Gonzalo/2nd Weight Sex  Delivery Anes PTL Lv   2 Para      Vag-Spont      1 Para      Vag-Spont            No LMP recorded (lmp unknown). Patient has had a hysterectomy.    Family History   Problem Relation Age of Onset   • Heart Disease Mother    • Heart Failure Mother    • Hypertension Mother    • Hyperlipidemia Mother    • Cancer Mother    • Cancer Maternal Aunt 60        breast ca   • Lung Disease Brother         COPD/Emphysema/Smoker   • Cancer Brother         prostate cancer   • Alcohol/Drug Brother         Alcohol   • Kidney Disease Father         renal failure       Review of Systems   All other systems reviewed and are negative.       Objective:   LMP  (LMP Unknown)     Physical Exam   Abdominal: Soft. Bowel sounds are normal.   Her incisions are clean, dry, and intact.  The ostomy is functioning well.       Labs:  Results for KANG HALL (MRN [id number]) as of 12/17/2018 13:56   Ref. Range 12/10/2018 13:16   WBC Latest Ref Range: 4.8 - 10.8 K/uL 7.1   RBC Latest Ref Range: 4.20 - 5.40 M/uL 4.48   Hemoglobin Latest Ref Range: 12.0 - 16.0 g/dL 12.4   Hematocrit Latest Ref Range: 37.0 - 47.0 % 39.2   MCV Latest Ref Range: 81.4 - 97.8 fL 87.5   MCH Latest Ref Range: 27.0 - 33.0 pg 27.7   MCHC Latest Ref Range: 33.6 - 35.0 g/dL 31.6 (L)   RDW Latest Ref Range: 35.9 - 50.0 fL 59.6 (H)   Platelet Count Latest Ref Range: 164 - 446 K/uL 299   MPV Latest Ref Range: 9.0 - 12.9 fL 10.3   Neutrophils-Polys Latest Ref Range: 44.00 - 72.00 % 60.90   Neutrophils (Absolute) Latest Ref Range: 2.00 - 7.15 K/uL 4.31   Lymphocytes Latest Ref Range: 22.00 - 41.00 % 26.00   Lymphs (Absolute) Latest Ref Range: 1.00 - 4.80 K/uL 1.84   Monocytes Latest Ref Range: 0.00 - 13.40 % 8.90   Monos (Absolute) Latest Ref Range: 0.00 - 0.85 K/uL 0.63   Eosinophils Latest Ref Range: 0.00 - 6.90 % 3.00   Eos (Absolute) Latest Ref Range: 0.00 - 0.51 K/uL 0.21   Basophils Latest Ref Range: 0.00 - 1.80 % 0.80   Baso (Absolute) Latest Ref Range: 0.00 - 0.12 K/uL  0.06   Immature Granulocytes Latest Ref Range: 0.00 - 0.90 % 0.40   Immature Granulocytes (abs) Latest Ref Range: 0.00 - 0.11 K/uL 0.03   Nucleated RBC Latest Units: /100 WBC 0.00   NRBC (Absolute) Latest Units: K/uL 0.00   Sodium Latest Ref Range: 135 - 145 mmol/L 136   Potassium Latest Ref Range: 3.6 - 5.5 mmol/L 3.8   Chloride Latest Ref Range: 96 - 112 mmol/L 105   Co2 Latest Ref Range: 20 - 33 mmol/L 23   Anion Gap Latest Ref Range: 0.0 - 11.9  8.0   Glucose Latest Ref Range: 65 - 99 mg/dL 129 (H)   Bun Latest Ref Range: 8 - 22 mg/dL 15   Creatinine Latest Ref Range: 0.50 - 1.40 mg/dL 1.03   GFR If  Latest Ref Range: >60 mL/min/1.73 m 2 >60   GFR If Non  Latest Ref Range: >60 mL/min/1.73 m 2 52 (A)   Calcium Latest Ref Range: 8.5 - 10.5 mg/dL 9.5   AST(SGOT) Latest Ref Range: 12 - 45 U/L 30   ALT(SGPT) Latest Ref Range: 2 - 50 U/L 27   Alkaline Phosphatase Latest Ref Range: 30 - 99 U/L 198 (H)   Total Bilirubin Latest Ref Range: 0.1 - 1.5 mg/dL 0.5   Albumin Latest Ref Range: 3.2 - 4.9 g/dL 3.6   Total Protein Latest Ref Range: 6.0 - 8.2 g/dL 6.8   Globulin Latest Ref Range: 1.9 - 3.5 g/dL 3.2   A-G Ratio Latest Units: g/dL 1.1   Carcinoembryonic Antigen Latest Ref Range: 0.0 - 3.0 ng/mL 2.0        Imaging:  Per my review  Impression CT 12/10/18    Hypodense component of previously identified lesion in the right hepatic dome has increased in size to 4.6 x 5.0 cm. Periphery of the lesion which previously had suggestion of subtle enhancement is now hypodense. High density material in the center of   the lesion appears unchanged.    No new liver lesions are appreciated. The liver remains extremely heterogeneous in density.         Pathology:  NA    Procedures:  11/15/18    Overlapping ablations to segment 7 of the liver x3, each being 4   minute at 140 yanes giving us a 4x5 cm ablation cavity overlapping the region   of the lateral posterior aspect and inferior aspect of the  previous ablation   cavity.    Assessment:     1. Secondary malignant neoplasm of liver (HCC)    2. Rectal cancer (HCC)        Medical Decision Making:  Today's Assessment / Status / Plan:     In light of the present findings, she has had a terrific response to her ablation.  Her CT scan shows complete ablation of the recurrent lesion thus the patient may be disease free.  Her CEA has also dropped to more normal level.  We will arrange for a repeat CT of the liver in 4 months.      She agreed and verbalized her agreement and understanding with the current plan. I answered all questions and concerns she has at this time and advised her to call at any time in there interim with questions or concerns in regards to her care.    Thank you for allowing me to participate in her care, I will continue to follow closely.       Please note that this dictation was created using voice recognition software. I have made every reasonable attempt to correct obvious errors, but I expect that there are errors of grammar and possibly content that I did not discover before finalizing the note.     Thank you for this consultation and allowing me to participate in your patient's care. If I can be of further service please contact my office.

## 2018-12-18 ENCOUNTER — OFFICE VISIT (OUTPATIENT)
Dept: HEMATOLOGY ONCOLOGY | Facility: MEDICAL CENTER | Age: 78
End: 2018-12-18
Payer: MEDICARE

## 2018-12-18 VITALS
OXYGEN SATURATION: 96 % | TEMPERATURE: 97.6 F | WEIGHT: 170.19 LBS | HEIGHT: 64 IN | BODY MASS INDEX: 29.06 KG/M2 | DIASTOLIC BLOOD PRESSURE: 72 MMHG | SYSTOLIC BLOOD PRESSURE: 124 MMHG | HEART RATE: 109 BPM

## 2018-12-18 DIAGNOSIS — C78.7 SECONDARY MALIGNANT NEOPLASM OF LIVER (HCC): ICD-10-CM

## 2018-12-18 DIAGNOSIS — C20 RECTAL CANCER (HCC): ICD-10-CM

## 2018-12-20 RX ORDER — DEXAMETHASONE SODIUM PHOSPHATE 4 MG/ML
8 INJECTION, SOLUTION INTRA-ARTICULAR; INTRALESIONAL; INTRAMUSCULAR; INTRAVENOUS; SOFT TISSUE ONCE
Status: CANCELLED | OUTPATIENT
Start: 2018-12-25 | End: 2018-12-25

## 2018-12-20 RX ORDER — ONDANSETRON 2 MG/ML
4 INJECTION INTRAMUSCULAR; INTRAVENOUS
Status: CANCELLED | OUTPATIENT
Start: 2018-12-25

## 2018-12-20 RX ORDER — PROCHLORPERAZINE MALEATE 10 MG
10 TABLET ORAL EVERY 6 HOURS PRN
Status: CANCELLED | OUTPATIENT
Start: 2018-12-25

## 2018-12-20 RX ORDER — LIDOCAINE HYDROCHLORIDE 10 MG/ML
10 INJECTION, SOLUTION INFILTRATION; PERINEURAL ONCE
Status: CANCELLED | OUTPATIENT
Start: 2018-12-25 | End: 2018-12-25

## 2018-12-20 RX ORDER — ONDANSETRON 8 MG/1
8 TABLET, ORALLY DISINTEGRATING ORAL
Status: CANCELLED | OUTPATIENT
Start: 2018-12-25

## 2018-12-20 RX ORDER — DEXTROSE MONOHYDRATE 50 MG/ML
INJECTION, SOLUTION INTRAVENOUS CONTINUOUS
Status: CANCELLED | OUTPATIENT
Start: 2018-12-25

## 2018-12-22 NOTE — PROGRESS NOTES
"Pharmacy Chemotherapy Verification    Patient Name: Karlene Walters      Dx: Colon CA        Cycle 73 (Miranda cycle 65)  Previous treatment: C73 December 11, 2018    Protocol: 5-FU+Leucovorin       IV over 2 hours on Day 1, followed by   IVP on Day 1, followed by     -Dose reduced by 20% to 320 mg/m² starting with Cycle 18 (Miranda #11) on 7/5/16 due to neutropenia     -10/31/17: delete Leucovorin and 5-FU push d/t neutropenia per Dr. Hardy   Fluorouracil 1200 mg/m²  IV continuous infusion daily on Days 1 and 2 (2400 mg/m² IV over 46-48 hours) -     -Dose reduced by 20% starting with Cycle 18 (Miranda #11) on 7/5/16 due to neutropenia    -20% reduction (1920 mg/m2) to be continued starting C28 per C Alsop APRN   14 day cycles for 12 cycles (adjuvant) or until disease progression or unacceptable toxicity  NCCN Guidelines for Colon Cancer. V.2.2015.  Jay T, et al. Eur J Cancer.1999;35(9):1343-7.      Allergies:  Codeine; Oxaliplatin; Pcn; Sulfa drugs; and Tape     /72   Pulse (!) 108   Temp 36.4 °C (97.6 °F) (Temporal)   Resp 18   Ht 1.65 m (5' 4.96\")   Wt 76.3 kg (168 lb 3.4 oz)   LMP  (LMP Unknown)   SpO2 97%   BMI 28.03 kg/m²  Body surface area is 1.87 meters squared.    Labs 12/25/18  ANC~2490 Plt = 259k   Hgb = 13.2       Labs 12/10/18 (CMP even cycles)  SCr = 1.03 mg/dL CrCl ~ 53.6 mL/min   AST/ALT/AP = 30/27/198 TBili = 0.5        Fluorouracil (5-FU) 1920 mg/m² x 1.87 m² = 3590.4 mg              <5% difference, OK to treat with final dose = 3590 mg    CIVI over 46 hours Via home infusion pump at 1.6 mL/hr    Lora Hernandez, PharmD, BCOP    "

## 2018-12-25 ENCOUNTER — APPOINTMENT (OUTPATIENT)
Dept: ONCOLOGY | Facility: MEDICAL CENTER | Age: 78
End: 2018-12-25
Attending: INTERNAL MEDICINE
Payer: MEDICARE

## 2018-12-25 VITALS
HEART RATE: 108 BPM | RESPIRATION RATE: 18 BRPM | WEIGHT: 168.21 LBS | DIASTOLIC BLOOD PRESSURE: 72 MMHG | TEMPERATURE: 97.6 F | HEIGHT: 65 IN | BODY MASS INDEX: 28.03 KG/M2 | SYSTOLIC BLOOD PRESSURE: 128 MMHG | OXYGEN SATURATION: 97 %

## 2018-12-25 DIAGNOSIS — R94.4 DECREASED GFR: ICD-10-CM

## 2018-12-25 DIAGNOSIS — C20 RECTAL CANCER (HCC): ICD-10-CM

## 2018-12-25 LAB
BASOPHILS # BLD AUTO: 1.5 % (ref 0–1.8)
BASOPHILS # BLD: 0.07 K/UL (ref 0–0.12)
EOSINOPHIL # BLD AUTO: 0.31 K/UL (ref 0–0.51)
EOSINOPHIL NFR BLD: 6.4 % (ref 0–6.9)
ERYTHROCYTE [DISTWIDTH] IN BLOOD BY AUTOMATED COUNT: 57.8 FL (ref 35.9–50)
HCT VFR BLD AUTO: 41.7 % (ref 37–47)
HGB BLD-MCNC: 13.2 G/DL (ref 12–16)
IMM GRANULOCYTES # BLD AUTO: 0.01 K/UL (ref 0–0.11)
IMM GRANULOCYTES NFR BLD AUTO: 0.2 % (ref 0–0.9)
LYMPHOCYTES # BLD AUTO: 1.37 K/UL (ref 1–4.8)
LYMPHOCYTES NFR BLD: 28.4 % (ref 22–41)
MCH RBC QN AUTO: 27.4 PG (ref 27–33)
MCHC RBC AUTO-ENTMCNC: 31.7 G/DL (ref 33.6–35)
MCV RBC AUTO: 86.7 FL (ref 81.4–97.8)
MONOCYTES # BLD AUTO: 0.57 K/UL (ref 0–0.85)
MONOCYTES NFR BLD AUTO: 11.8 % (ref 0–13.4)
NEUTROPHILS # BLD AUTO: 2.49 K/UL (ref 2–7.15)
NEUTROPHILS NFR BLD: 51.7 % (ref 44–72)
NRBC # BLD AUTO: 0 K/UL
NRBC BLD-RTO: 0 /100 WBC
PLATELET # BLD AUTO: 259 K/UL (ref 164–446)
PMV BLD AUTO: 10.5 FL (ref 9–12.9)
RBC # BLD AUTO: 4.81 M/UL (ref 4.2–5.4)
WBC # BLD AUTO: 4.8 K/UL (ref 4.8–10.8)

## 2018-12-25 PROCEDURE — 96374 THER/PROPH/DIAG INJ IV PUSH: CPT

## 2018-12-25 PROCEDURE — 96375 TX/PRO/DX INJ NEW DRUG ADDON: CPT

## 2018-12-25 PROCEDURE — A4212 NON CORING NEEDLE OR STYLET: HCPCS

## 2018-12-25 PROCEDURE — G0498 CHEMO EXTEND IV INFUS W/PUMP: HCPCS

## 2018-12-25 PROCEDURE — 85025 COMPLETE CBC W/AUTO DIFF WBC: CPT

## 2018-12-25 PROCEDURE — 700111 HCHG RX REV CODE 636 W/ 250 OVERRIDE (IP): Performed by: NURSE PRACTITIONER

## 2018-12-25 RX ORDER — DEXAMETHASONE SODIUM PHOSPHATE 4 MG/ML
8 INJECTION, SOLUTION INTRA-ARTICULAR; INTRALESIONAL; INTRAMUSCULAR; INTRAVENOUS; SOFT TISSUE ONCE
Status: COMPLETED | OUTPATIENT
Start: 2018-12-25 | End: 2018-12-25

## 2018-12-25 RX ADMIN — DEXAMETHASONE SODIUM PHOSPHATE 8 MG: 4 INJECTION, SOLUTION INTRAMUSCULAR; INTRAVENOUS at 08:08

## 2018-12-25 RX ADMIN — FLUOROURACIL 3590 MG: 50 INJECTION, SOLUTION INTRAVENOUS at 08:44

## 2018-12-25 ASSESSMENT — PAIN SCALES - GENERAL: PAINLEVEL_OUTOF10: 0

## 2018-12-25 NOTE — PROGRESS NOTES
Chemotherapy Verification - PRIMARY RN      Height = 1.65 m  Weight = 76.3 kg  BSA = 1.87 m2       Medication: fluorouracil CADD pump  Dose: 1920 mg/m2  Calculated Dose: 3590.4 mg                             (In mg/m2, AUC, mg/kg)       I confirm this process was performed independently with the BSA and all final chemotherapy dosing calculations congruent.  Any discrepancies of 5% or greater have been addressed with the chemotherapy pharmacist. The resolution of the discrepancy has been documented in the EPIC progress notes.

## 2018-12-25 NOTE — PROGRESS NOTES
"Pharmacy Chemotherapy Verification    Patient Name: Karlene Walters      Dx: Colon CA        Protocol: 5-FU + Leucovorin      *Dosing Reference*   IV over 2 hours on Day 1, followed by    IVP on Day 1, followed by         -- 10/31/17: delete Leucovorin and 5-FU push d/t neutropenia per Dr. Hardy   Fluorouracil   IV continuous infusion daily on Days 1 and 2 ( IV over 46-48 hours)        -- 20% reduction (1920 mg/m²) to be continued starting C28 per C Alsop APRN   14 day cycles for 12 cycles (adjuvant) or until disease progression or unacceptable toxicity  NCCN Guidelines for Colon Cancer. V.2.2015.  Jay T, et al. Eur J Cancer.1999;35(9):1343-7.      Allergies:  Codeine; Oxaliplatin; Pcn; Sulfa drugs; and Tape       /72   Pulse (!) 108   Temp 36.4 °C (97.6 °F) (Temporal)   Resp 18   Ht 1.65 m (5' 4.96\")   Wt 76.3 kg (168 lb 3.4 oz)   LMP  (LMP Unknown)   SpO2 97%   BMI 28.03 kg/m²  Body surface area is 1.87 meters squared.    Labs 12/25/18  ANC~2500 Plt = 259k   Hgb = 13.2    Labs 12/10/18     SCr = 1.03 mg/dL CrCl ~ 54 mL/min  AST/ALT/AP = 30/27/198 TBili = 0.5    Drug Order   (Drug name, dose, route, IV Fluid & volume, frequency, number of doses) Cycle 73 (C65 in [place])   Previous treatment: C72 = 12/11/18   Medication = Fluorouracil (5-FU)   Base Dose = 1920 mg/m²  Calc Dose:Base Dose x 1.87 m² = 3590.4mg  Final Dose = 3590 mg   Route = CIVI  Drug Conc = 50 mg/mL  Fluid & Volume = 71.8 mL (+ 3 mL overfill = 74.8 mL)  Admin Duration = CIVI over 46 hrs @ 1.6 mL/hr   Via CADD pump for home infusion        <5% difference, OK to treat with final dose     By my signature below, I confirm this process was performed independently with the BSA and all final chemotherapy dosing calculations congruent. I have reviewed the above chemotherapy order and that my calculation of the final dose and BSA (when applicable) corroborate those calculations of the  pharmacist.     Lalit Andrade, PharmD, " BCOP

## 2018-12-25 NOTE — PROGRESS NOTES
Pt ambulatory w/  to [facility] for labs and 5FU pump connect.  Pt w/ no s/s of infection, pt has no complaints at this time.  Pt PORT accessed by Tamara BOWIE, w/ brisk blood return, labs drawn per MD orders, flushed per protocol.  Lab results w/n parameters for tx today.  Decadron given by IVP w/ no adverse reactions.  CADD pump connected, all clamps opened, pump confirmed to be in RUN mode, 73.5 mLs total volume to be administered with overfill. Pt left on foot in NAD.  Confirmed pt's next appt.

## 2018-12-25 NOTE — PROGRESS NOTES
Chemotherapy Verification - SECONDARY RN      Height = 165cm  Weight = 76.3kg  BSA = 1.87m2       Medication: Fluorouracil  Dose: 1920mg/m2  Calculated Dose: 3590.4mg (CADD pump over 46h)                             (In mg/m2, AUC, mg/kg)       I confirm this process was performed independently with the BSA and all final chemotherapy dosing calculations congruent.  Any discrepancies of 5% or greater have been addressed with the chemotherapy pharmacist. The resolution of the discrepancy has been documented in the EPIC progress notes.

## 2018-12-27 ENCOUNTER — TELEPHONE (OUTPATIENT)
Dept: HEMATOLOGY ONCOLOGY | Facility: MEDICAL CENTER | Age: 78
End: 2018-12-27

## 2018-12-27 ENCOUNTER — OUTPATIENT INFUSION SERVICES (OUTPATIENT)
Dept: ONCOLOGY | Facility: MEDICAL CENTER | Age: 78
End: 2018-12-27
Attending: INTERNAL MEDICINE
Payer: MEDICARE

## 2018-12-27 VITALS
HEART RATE: 75 BPM | DIASTOLIC BLOOD PRESSURE: 63 MMHG | SYSTOLIC BLOOD PRESSURE: 138 MMHG | WEIGHT: 170.86 LBS | TEMPERATURE: 97.9 F | BODY MASS INDEX: 28.47 KG/M2 | RESPIRATION RATE: 18 BRPM | HEIGHT: 65 IN

## 2018-12-27 DIAGNOSIS — C20 RECTAL CANCER (HCC): ICD-10-CM

## 2018-12-27 DIAGNOSIS — R94.4 DECREASED GFR: ICD-10-CM

## 2018-12-27 PROCEDURE — 96523 IRRIG DRUG DELIVERY DEVICE: CPT

## 2018-12-27 PROCEDURE — 700111 HCHG RX REV CODE 636 W/ 250 OVERRIDE (IP)

## 2018-12-27 RX ADMIN — SODIUM CHLORIDE, PRESERVATIVE FREE 500 UNITS: 5 INJECTION INTRAVENOUS at 14:02

## 2018-12-27 ASSESSMENT — PAIN SCALES - GENERAL: PAINLEVEL_OUTOF10: 0

## 2018-12-27 NOTE — PROGRESS NOTES
"Pt arrived to [facility] ambulatory, reporting pump no longer administering 5FU, pump alarming \"high pressure\". Port site assessed, chemo disconnected and attempted to flush port. Unable to flush port or obtain blood return. Pt had called pump company and pump was turned off by pt. Port de-accessed and re-accessed with new needle. Port flushed and blood return observed. Pump reattached and set to run. Verified pump able to administer medication prior to pt leaving. Pt will return later this afternoon, most likely around 1330. Pt discharged home under care of , remaining vol in pump of 9.6 ml, rate 1.6 ml/hr.   "

## 2018-12-27 NOTE — TELEPHONE ENCOUNTER
"Sarah from [employer] left a message for our office stating the patient's pump was turned off due to a \"high pressure alarm\" at the pump site. She states this was due to slight pressure and the needle was most likely in the incorrect place. The resevoir volume at the time of shut off was 9.7. I left a message for Sarah to contact our office to discuss further.     I tried contacting the patient no answer. I left a message for return call.   "

## 2018-12-28 NOTE — PROGRESS NOTES
Late entry for 1402:  Pt to infusion services ambulatory per self.  Pt here for scheduled disconnect from CIVI of 5FU via CADD pump, and port flush.  Plan of care reviewed.  Pt verbalizes understanding.  Pump settings verified, display shows reservoir volume = 0.0 mL and volume given = 73.5 mL.  Cassette/cartridge observed to be empty.  Pump turned off, line clamped, and line disconnected from patient.  Port flushed with normal saline per policy.  Port flushes easily; +brisk blood return verified.  Port flushed with normal saline and heparin per policy.  Port de-accessed; needle intact.  Pressure dressing applied.  Pt left infusion services in no apparent distress after completion of treatment, ambulatory.  Pt to return in 2 weeks; next appointment scheduled.  Pump cleansed and returned to pharmacy.

## 2019-01-04 NOTE — PROGRESS NOTES
"Pharmacy Chemotherapy Verification      Dx: Colon CA        Cycle 74 ([place] cycle 66)  Previous treatment:     Protocol: 5-FU+Leucovorin      *Dosing Reference*   IV over 2 hours on Day 1, followed by   IVP on Day 1, followed by     - Dose reduced by 20% to  starting with Cycle 18 ([place] #11) on 7/5/16 due to neutropenia     - 10/31/17: delete Leucovorin and 5-FU push d/t neutropenia per Dr. Hardy   Fluorouracil   IV continuous infusion daily on Days 1 and 2 ( IV over 46-48 hours) -     - Dose reduced by 20% starting with Cycle 18 ([place] #11) on 7/5/16 due to neutropenia    - 20% reduction (1920 mg/m2) to be continued starting C28 per C Alsop APRN   14 day cycles for 12 cycles (adjuvant) or until disease progression or unacceptable toxicity  NCCN Guidelines for Colon Cancer. V.2.2015.  Jay GROVER, et al. Eur J Cancer.1999;35(9):1343-7.      Allergies:  Codeine; Oxaliplatin; Pcn; Sulfa drugs; and Tape     /78   Pulse (!) 103   Temp 36.6 °C (97.9 °F) (Temporal)   Resp 18   Ht 1.65 m (5' 4.96\")   Wt 78.1 kg (172 lb 2.9 oz)   LMP  (LMP Unknown)   SpO2 96%   BMI 28.69 kg/m²  Body surface area is 1.89 meters squared.    Labs 1/8/19:  ANC~ 2330   Plt = 220 k     Hgb = 12.4  SCr = 0.93 mg/dL  CrCl ~ 61 mL/min   AST/ALT/AP = 31/24/160 (no adjustments recommended) TBili = 0.5        Fluorouracil (5-FU) 1920 mg/m² x 1.89 m² = 3629 mg              <5% difference, OK to treat with final dose = 3630 mg CIVI over 46 hours Via home infusion pump at 1.6 mL/hr      Yi Knott, PharmD  "

## 2019-01-07 ENCOUNTER — OFFICE VISIT (OUTPATIENT)
Dept: HEMATOLOGY ONCOLOGY | Facility: MEDICAL CENTER | Age: 79
End: 2019-01-07
Payer: MEDICARE

## 2019-01-07 ENCOUNTER — APPOINTMENT (OUTPATIENT)
Dept: HEMATOLOGY ONCOLOGY | Facility: MEDICAL CENTER | Age: 79
End: 2019-01-07
Payer: MEDICARE

## 2019-01-07 VITALS
RESPIRATION RATE: 16 BRPM | TEMPERATURE: 98.9 F | DIASTOLIC BLOOD PRESSURE: 70 MMHG | SYSTOLIC BLOOD PRESSURE: 132 MMHG | HEART RATE: 100 BPM | OXYGEN SATURATION: 97 % | WEIGHT: 173.17 LBS | HEIGHT: 65 IN | BODY MASS INDEX: 28.85 KG/M2

## 2019-01-07 DIAGNOSIS — C20 RECTAL CANCER (HCC): ICD-10-CM

## 2019-01-07 DIAGNOSIS — C78.7 SECONDARY MALIGNANT NEOPLASM OF LIVER (HCC): ICD-10-CM

## 2019-01-07 DIAGNOSIS — Z86.711 HISTORY OF PULMONARY EMBOLISM: ICD-10-CM

## 2019-01-07 DIAGNOSIS — R52 UNCONTROLLED PAIN: ICD-10-CM

## 2019-01-07 PROCEDURE — 99214 OFFICE O/P EST MOD 30 MIN: CPT | Performed by: INTERNAL MEDICINE

## 2019-01-07 RX ORDER — ONDANSETRON 8 MG/1
8 TABLET, ORALLY DISINTEGRATING ORAL
Status: CANCELLED | OUTPATIENT
Start: 2019-01-08

## 2019-01-07 RX ORDER — OXYCODONE HYDROCHLORIDE 5 MG/1
5 TABLET ORAL EVERY 8 HOURS PRN
Qty: 45 TAB | Refills: 0 | Status: SHIPPED | OUTPATIENT
Start: 2019-01-07 | End: 2019-01-07 | Stop reason: SDUPTHER

## 2019-01-07 RX ORDER — DEXAMETHASONE SODIUM PHOSPHATE 4 MG/ML
8 INJECTION, SOLUTION INTRA-ARTICULAR; INTRALESIONAL; INTRAMUSCULAR; INTRAVENOUS; SOFT TISSUE ONCE
Status: CANCELLED | OUTPATIENT
Start: 2019-01-22 | End: 2019-01-22

## 2019-01-07 RX ORDER — LIDOCAINE HYDROCHLORIDE 10 MG/ML
10 INJECTION, SOLUTION INFILTRATION; PERINEURAL ONCE
Status: CANCELLED | OUTPATIENT
Start: 2019-01-22 | End: 2019-01-22

## 2019-01-07 RX ORDER — ONDANSETRON 2 MG/ML
4 INJECTION INTRAMUSCULAR; INTRAVENOUS
Status: CANCELLED | OUTPATIENT
Start: 2019-01-22

## 2019-01-07 RX ORDER — ONDANSETRON 8 MG/1
8 TABLET, ORALLY DISINTEGRATING ORAL
Status: CANCELLED | OUTPATIENT
Start: 2019-01-22

## 2019-01-07 RX ORDER — ONDANSETRON 2 MG/ML
4 INJECTION INTRAMUSCULAR; INTRAVENOUS
Status: CANCELLED | OUTPATIENT
Start: 2019-01-08

## 2019-01-07 RX ORDER — PROCHLORPERAZINE MALEATE 10 MG
10 TABLET ORAL EVERY 6 HOURS PRN
Status: CANCELLED | OUTPATIENT
Start: 2019-01-08

## 2019-01-07 RX ORDER — LIDOCAINE HYDROCHLORIDE 10 MG/ML
10 INJECTION, SOLUTION INFILTRATION; PERINEURAL ONCE
Status: CANCELLED | OUTPATIENT
Start: 2019-01-08 | End: 2019-01-08

## 2019-01-07 RX ORDER — DEXAMETHASONE SODIUM PHOSPHATE 4 MG/ML
8 INJECTION, SOLUTION INTRA-ARTICULAR; INTRALESIONAL; INTRAMUSCULAR; INTRAVENOUS; SOFT TISSUE ONCE
Status: CANCELLED | OUTPATIENT
Start: 2019-01-08 | End: 2019-01-08

## 2019-01-07 RX ORDER — PROCHLORPERAZINE MALEATE 10 MG
10 TABLET ORAL EVERY 6 HOURS PRN
Status: CANCELLED | OUTPATIENT
Start: 2019-01-22

## 2019-01-07 RX ORDER — DEXTROSE MONOHYDRATE 50 MG/ML
INJECTION, SOLUTION INTRAVENOUS CONTINUOUS
Status: CANCELLED | OUTPATIENT
Start: 2019-01-22

## 2019-01-07 RX ORDER — OXYCODONE HYDROCHLORIDE 5 MG/1
5 TABLET ORAL EVERY 8 HOURS PRN
Qty: 45 TAB | Refills: 0 | Status: SHIPPED | OUTPATIENT
Start: 2019-01-07 | End: 2019-05-07 | Stop reason: SDUPTHER

## 2019-01-07 RX ORDER — DEXTROSE MONOHYDRATE 50 MG/ML
INJECTION, SOLUTION INTRAVENOUS CONTINUOUS
Status: CANCELLED | OUTPATIENT
Start: 2019-01-08

## 2019-01-07 RX ORDER — OXYCODONE HYDROCHLORIDE 5 MG/1
5 TABLET ORAL EVERY 4 HOURS PRN
COMMUNITY
End: 2019-01-07 | Stop reason: SDUPTHER

## 2019-01-07 ASSESSMENT — PAIN SCALES - GENERAL: PAINLEVEL: NO PAIN

## 2019-01-07 NOTE — PROGRESS NOTES
Patient personally saw Dr. Hardy's today however he was unable to print prescription.  Prescription has been reprinted by myself.  I did confirm by the [place] prescription monitoring program that she was given oxycodone 5 mg tablet last by myself on June 30, 2018 quantity #30.  Dr. Hardy is giving patient oxycodone 5 mg tablets, quantity #45 today.  Patient with cancer related pain.      Opioid Risk Score: 0    Interpretation of Opioid Risk Score   Score 0-3 = Low risk of abuse. Do UDS at least once per year.  Score 4-7 = Moderate risk of abuse. Do UDS 1-4 times per year.  Score 8+ = High risk of abuse. Refer to specialist.    In prescribing controlled substances to this patient, I certify that I have obtained and reviewed the medical history of Karlene Walters. I have also made a good dionte effort to obtain applicable records from other providers who have treated the patient and records did not demonstrate any increased risk of substance abuse that would prevent me from prescribing controlled substances.     I have conducted a physical exam and documented it. I have reviewed Ms. Walters’s prescription history as maintained by the [place] Prescription Monitoring Program.     I have assessed the patient’s risk for abuse, dependency, and addiction using the validated Opioid Risk Tool available at https://www.mdcalc.com/nhbmkn-jhrq-xtas-ort-narcotic-abuse.     Given the above, I believe the benefits of controlled substance therapy outweigh the risks. The reasons for prescribing controlled substances include non-narcotic, oral analgesic alternatives have been inadequate for pain control. Accordingly, I have discussed the risk and benefits, treatment plan, and alternative therapies with the patient.

## 2019-01-07 NOTE — PROGRESS NOTES
Date of visit: 1/7/2019  3:01 PM      Chief Complaint- Metastatic rectal cancer to liver-KRS mutated        Identification/Prior relevant history: Originally diagnosed with a rectal carcinoma in 2012 secondary to bleeding. s/pt laparoscopic resection with primary anastomosis and final pathology was consistent with well-differentiated adenocarcinoma with 0/30 nodes. She was staged pT2 N0. 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      8/2015 she was diagnosed with metastatic disease to the liver with a 2.4 cm mass as well as small pulmonary nodules and thyroid nodules. She underwent an ablative therapy to the liver. She was then started on Xelox. She had  allergic reaction to oxaliplatin and was continued on Xeloda. This eventually led to increased diarrhea and her regimen was changed to single agent 5-FU. She has been tolerating the therapy well with good responses on imaging.      3/2017- CT of the chest abdomen pelvis which has been stable and no evidence of disease. CT of the wrist was consistent with tenosynovitis and degenerative changes.     5/17-established care with me.   EGD and colonoscopy  did not reveal any local recurrence. She had some gastritis.   - CEA has trended up to 13  -PET scan isolated hepatic lesion. Patchy basilar pulmonary opacities. Seen by Dr. West referred for Y90 embolization  6/17- Y90 Theraspheres.  Continued on 5-FU.  -CEA trending down/normalized     -CT abdomen and-3/9/18- Findings are consistent with posttreatment changes in this portion of the liver after Y 90 procedure.  Interim history-     Restarted 5-FU maintenance, she tends to develop transient chest pain and some palmar erythema. A few days. Tears, chronic shortness of breath with occasional flareups. Follow-up with pulmonary.  6/29/80-CT chest negative  CEA tumor markers have been trending  up.  9/7/18-No definite new liver lesion identified.    Interim history  -=10/25/18-PET scan  Hypermetabolic 2.5 cm lesion adjacent/to the right of the low attenuation treatment bed in the hepatic dome, in keeping with persistent residual disease next to the treatment bed  -11/15/18-microwave Overlapping ablations to segment 7 of the liver x3, each being 4 minute at 140 yanes giving us a 4x5 cm ablation cavity overlapping the region of the lateral posterior aspect and inferior aspect of the previous ablation Cavity.    12/10/18- Hypodense component of previously identified lesion in the right hepatic dome has increased in size to 4.6 x 5.0 cm.    She is otherwise doing okay and tolerating maintenance 5-FU reasonably well.  She tend to develop some chest discomfort during the infusion for which she takes oxycodone which has helped    Past Medical History:      Past Medical History:   Diagnosis Date   • Arrhythmia     occasional   • Blood clotting disorder (HCC) 08/2015    bilat PE    • Blood transfusion 1957   • Breath shortness     no O2 with moderate exertion   • Cancer (HCC) 09/2012    rectal cancer,  Liver CA-2015   • CATARACT     removed b/l   • Chemotherapy-induced neutropenia (HCC) 9/28/2016   • Chickenpox    • Colon cancer (HCC)    • Dental disorder     dentures UPPERS AND LOWERS   • Elevated alkaline phosphatase level 11/15/2017   • Emphysema of lung (HCC)    • Fall    • [place] measles    • Heart murmur     as a child   • Hemorrhagic disorder (HCC)     on Xarelto    • Hypercholesteremia    • Hypertension     no meds currently    • Ileostomy in place (HCC)    • Indigestion    • Influenza    • Lightheadedness 9/17/2015   • Mumps    • Obstruction     ileostomy   • Other specified symptom associated with female genital organs     HYSTERECTOMY 1993   • Pneumonia 05/2017    tx'd with antibx   • Pulmonary embolism (HCC)    • 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)    • Renal insufficiency 3/14/2016   •  Routine general medical examination at a [facility] facility 3/14/2016    3/14/16   • Seasonal allergies    • Swelling of both ankles     Lasix PRN   • Tonsillitis        Past surgical history:       Past Surgical History:   Procedure Laterality Date   • HEPATIC ABLATION LAPAROSCOPIC  11/15/2018    Procedure: HEPATIC ABLATION LAPAROSCOPIC.- SEGMENT 7/8;  Surgeon: Kit West M.D.;  Location: SURGERY [facility];  Service: General   • COLONOSCOPY WITH BIOPSY  8/23/2015    Procedure: COLONOSCOPY WITH BIOPSY;  Surgeon: Wilbur Glasgow M.D.;  Location: ENDOSCOPY [facility];  Service:    • HEPATIC ABLATION LAPAROSCOPIC  7/6/2015    Procedure: HEPATIC ABLATION LAPAROSCOPIC.;  Surgeon: Kit West M.D.;  Location: SURGERY [facility];  Service:    • CATH PLACEMENT Left 7/6/2015    Procedure: CATH PLACEMENT CEPHALIC POWERPORT;  Surgeon: Kit West M.D.;  Location: SURGERY [facility];  Service:    • FISTULA IN ANO REPAIR  1/28/2015    Performed by Kolton Montgomery M.D. at SURGERY [facility]   • PERINEAL PROCEDURE  1/28/2015    Performed by Kolton Montgomery M.D. at SURGERY [facility]   • FISTULA IN ANO REPAIR  10/15/2014    Performed by Kolton Montgomery M.D. at SURGERY [facility]   • PERINEAL PROCEDURE  10/15/2014    Performed by Kolton Montgomery M.D. at SURGERY [facility]   • IRRIGATION & DEBRIDEMENT GENERAL  2/19/2014    Performed by Kolton Montgomery M.D. at SURGERY [facility]   • FLAP GRAFT  2/19/2014    Performed by Kolton Montgomery M.D. at SURGERY [facility]   • PERINEAL PROCEDURE  12/18/2013    Performed by Kolton Montgomery M.D. at SURGERY [facility]   • MYOCUTANEOUS FLAP  12/18/2013    Performed by Kolton Montgomery M.D. at [facility]   • IRRIGATION & DEBRIDEMENT GENERAL  10/23/2013    Performed by Kolton Montgomery M.D. at SURGERY [facility]   • FISTULA IN ANO REPAIR  9/4/2013    Performed by Kolton Montgomery M.D. at [facility]  ORS   • FLAP ROTATION  9/4/2013    Performed by Kolton Montgomery M.D. at SURGERY [facility] ORS   • RECOVERY  8/26/2013    Performed by Cath-Recovery Surgery at SURGERY SAME DAY [facility] ORS   • BIOPSY GENERAL  7/17/2013    Performed by Kolton Montgomery M.D. at SURGERY [facility] ORS   • PROCTOSCOPY  7/17/2013    Performed by Kolton Montgomery M.D. at SURGERY [facility] ORS   • FISTULA IN ANO REPAIR  2/27/2013    Performed by Kolton Montgomery M.D. at SURGERY [facility] ORS   • SIGMOIDOSCOPY  2/27/2013    Performed by Kolton Montgomery M.D. at SURGERY [facility] ORS   • LAPAROSCOPY  10/8/2012    Performed by Kolton Montgomery M.D. at [facility] ORS   • ILEO LOOP DIVERSION  10/8/2012    Performed by Kolton Montgomery M.D. at SURGERY [facility] ORS   • COLECTOMY  9/26/2012    Performed by Kolton Montgomery M.D. at SURGERY [facility] ORS   • COLONOSCOPY FLEX W/ENDO US  9/20/2012    Performed by Harshil Bolton M.D. at SURGERY [facility] ORS   • OTHER  2009    left eye torn retina repair   • CATARACT EXTRACTION WITH IOL  2004    bilateral   • ABDOMINAL HYSTERECTOMY TOTAL  1983   • CYST EXCISION  1972    ovarian   • COLON RESECTION     • EYE SURGERY      cataracts   • HYSTERECTOMY LAPAROSCOPY     • PB REMV 2ND CATARACT,CORN-SCLER SECTN     • TONSILLECTOMY         Allergies:       Oxaliplatin; Codeine; Pcn [penicillins]; Sulfa drugs; and Tape    Medications:         Current Outpatient Prescriptions   Medication Sig Dispense Refill   • oxyCODONE immediate-release (ROXICODONE) 5 MG Tab Take 5 mg by mouth every four hours as needed for Severe Pain.     • rivaroxaban (XARELTO) 10 MG Tab tablet Take 10 mg by mouth.     • Tiotropium Bromide-Olodaterol (STIOLTO RESPIMAT) 2.5-2.5 MCG/ACT Aero Soln Take 2 Puffs by mouth every day. 1 Inhaler 11   • albuterol (PROAIR HFA) 108 (90 Base) MCG/ACT Aero Soln inhalation aerosol Inhale 2 Puffs by mouth every four hours as needed for Shortness of Breath (wheezing). 1 Inhaler 11   • promethazine  (PHENERGAN) 25 MG Tab Take 0.5 Tabs by mouth every 6 hours as needed. 30 Tab 0   • furosemide (LASIX) 40 MG Tab Take 1 Tab by mouth every day. 90 Tab 3   • raNITidine (ZANTAC) 150 MG Tab TAKE 1 TABLET 2 TIMES A DAY 60 Tab 3   • NON SPECIFIED by Intravenous route every 14 days. Chemo 5 FU     • metronidazole (METROCREAM) 0.75 % cream Apply 1 Each to affected area(s) 2 times a day.     • potassium chloride ER (KLOR-CON) 10 MEQ tablet TAKE ONE TABLET EVERY DAY AS NEEDED 30 Tab 6   • loperamide (IMODIUM) 2 MG Cap Take 2 mg by mouth 4 times a day as needed for Diarrhea.     • lidocaine-prilocaine (EMLA) 2.5-2.5 % Cream APPLY A THICK FILM OVER THE PORT ACCESS SITE PRIOR TO ACCESS 30 g 2   • ondansetron (ZOFRAN) 4 MG Tab tablet Take 4 mg by mouth as needed.     • cyanocobalamin (VITAMIN B-12) 500 MCG Tab Take 500 mcg by mouth every day.     • Multiple Vitamins-Minerals (CENTRUM SILVER PO) Take 1 Tab by mouth every day.     • Cholecalciferol (VITAMIN D3) 400 UNITS CAPS Take 1 Cap by mouth every day.     • loratadine (CLARITIN) 10 MG TABS Take 10 mg by mouth every day. Indications: Hayfever     • isosorbide mononitrate SR (IMDUR) 30 MG TABLET SR 24 HR Take 1 Tab by mouth every morning. (Patient taking differently: Take 30 mg by mouth. Take day of Chem and following day) 30 Tab 3     No current facility-administered medications for this visit.          Social History:     Social History     Social History   • Marital status:      Spouse name: N/A   • Number of children: N/A   • Years of education: N/A     Occupational History   • Not on file.     Social History Main Topics   • Smoking status: Never Smoker   • Smokeless tobacco: Never Used   • Alcohol use No      Comment: rare   • Drug use: No   • Sexual activity: No     Other Topics Concern   • Not on file     Social History Narrative   • No narrative on file       Family History:      Family History   Problem Relation Age of Onset   • Heart Disease Mother    • Heart  "Failure Mother    • Hypertension Mother    • Hyperlipidemia Mother    • Cancer Mother    • Cancer Maternal Aunt 60        breast ca   • Lung Disease Brother         COPD/Emphysema/Smoker   • Cancer Brother         prostate cancer   • Alcohol/Drug Brother         Alcohol   • Kidney Disease Father         renal failure       Review of Systems:  All other review of systems are negative except what was mentioned above in the HPI.    Constitutional: Negative for fever, chills, weight loss and malaise/fatigue.    HEENT: No new auditory or visual complaints. No sore throat and neck pain.     Respiratory: Negative for cough, sputum production, shortness of breath and wheezing.    Cardiovascular: Negative for chest pain, palpitations, orthopnea and leg swelling.    Gastrointestinal: Negative for heartburn, nausea, vomiting and abdominal pain.    Genitourinary: Negative for dysuria, hematuria    Musculoskeletal: No new arthralgias or myalgias   Skin: Negative for rash and itching.    Neurological: Negative for focal weakness and headaches.    Endo/Heme/Allergies: No abnormal bleed/bruise.    Psychiatric/Behavioral: No new depression/anxiety.    Physical Exam:  Vitals: /70 (BP Location: Right arm, Patient Position: Sitting, BP Cuff Size: Large adult)   Pulse 100   Temp 37.2 °C (98.9 °F) (Temporal)   Resp 16   Ht 1.65 m (5' 4.96\")   Wt 78.5 kg (173 lb 2.7 oz)   LMP  (LMP Unknown)   SpO2 97%   BMI 28.85 kg/m²      General: Not in acute distress, alert and oriented x 3  HEENT: No pallor, icterus. Oropharynx clear.   Neck: Supple, no palpable masses.  Lymph nodes: No palpable cervical, supraclavicular, axillary or inguinal lymphadenopathy.    CVS: regular rate and rhythm, no rubs or gallops  RESP: Clear to auscultate bilaterally, no wheezing or crackles.   ABD: Soft, non tender, non distended, positive bowel sounds, no palpable organomegaly  EXT: No edema or cyanosis  CNS: Alert and oriented x3, No focal " deficits.  Skin- No rash      Labs:   No visits with results within 1 Week(s) from this visit.   Latest known visit with results is:   Outpatient Infusion Services on 12/25/2018   Component Date Value Ref Range Status   • WBC 12/25/2018 4.8  4.8 - 10.8 K/uL Final   • RBC 12/25/2018 4.81  4.20 - 5.40 M/uL Final   • Hemoglobin 12/25/2018 13.2  12.0 - 16.0 g/dL Final   • Hematocrit 12/25/2018 41.7  37.0 - 47.0 % Final   • MCV 12/25/2018 86.7  81.4 - 97.8 fL Final   • MCH 12/25/2018 27.4  27.0 - 33.0 pg Final   • MCHC 12/25/2018 31.7* 33.6 - 35.0 g/dL Final   • RDW 12/25/2018 57.8* 35.9 - 50.0 fL Final   • Platelet Count 12/25/2018 259  164 - 446 K/uL Final   • MPV 12/25/2018 10.5  9.0 - 12.9 fL Final   • Neutrophils-Polys 12/25/2018 51.70  44.00 - 72.00 % Final   • Lymphocytes 12/25/2018 28.40  22.00 - 41.00 % Final   • Monocytes 12/25/2018 11.80  0.00 - 13.40 % Final   • Eosinophils 12/25/2018 6.40  0.00 - 6.90 % Final   • Basophils 12/25/2018 1.50  0.00 - 1.80 % Final   • Immature Granulocytes 12/25/2018 0.20  0.00 - 0.90 % Final   • Nucleated RBC 12/25/2018 0.00  /100 WBC Final   • Neutrophils (Absolute) 12/25/2018 2.49  2.00 - 7.15 K/uL Final    Includes immature neutrophils, if present.   • Lymphs (Absolute) 12/25/2018 1.37  1.00 - 4.80 K/uL Final   • Monos (Absolute) 12/25/2018 0.57  0.00 - 0.85 K/uL Final   • Eos (Absolute) 12/25/2018 0.31  0.00 - 0.51 K/uL Final   • Baso (Absolute) 12/25/2018 0.07  0.00 - 0.12 K/uL Final   • Immature Granulocytes (abs) 12/25/2018 0.01  0.00 - 0.11 K/uL Final   • NRBC (Absolute) 12/25/2018 0.00  K/uL Final       Assessment and Plan:    Metastatic rectal cancer, KRAS mutated: She had a rather indolent course and has been on Xeloda for few months in  2015 followed by 5-FU maintenance since early 2016.,   She had isolated relapse in the liver. She is status post Y90 theraspheres 6/2017 with normalization of her CEA level. Posttreatment images are consistent with treated metastatic  lesion. She resumed  the 5-FU infusion after a short break.  Subsequently developed some progression which was limited to the liver for which she underwent microwave ablation with normalization of the CEA levels.  PET scan did not reveal any other sites of disease.  CEA has improved to 2. Given the Optimox data and her reasonably good tolerance, recommended continuing maintenance 5-FU.  She also feels more comfortable being on maintenance chemo.  She has an upcoming CT scan ordered by Dr. West in March.  We will continue tracking her CEA levels and decide on imaging accordingly.    ? 1-XX-kenbvwt vasospasm- this is improved after a few doses of 5-FU.     Exertional dyspnea-6/2018 CT of the chest, grossly unremarkable. She feels better with inhalers. She will follow up with pulmonary. Agree with exercise recommendation.     3 History of pulmonary embolus: He was diagnosed in 8/2015 and was initially treated with Coumadin then changed to Xarelto. She had completed 6 months of therapy but was continued on anticoagulation secondary to underlying malignancy.. She did have significant clot burden and pulmonary infarction at the time of presentation.. She  had some bleeding around the stoma and she is currently on dose reduced Eliquis  2.5 mg twice a day.     4 Anal repair and reconstruction: Patient was seen by Dr. Montgomery and has had multiple surgeries to this area. Further surgery has been deferred. She continues to follow-up with Dr. Montgomery periodically  She agreed and verbalized  agreement and understanding with the current plan.  I answered all questions and concerns at this time         Please note that this dictation was created using voice recognition software. I have made every reasonable attempt to correct obvious errors, but I expect that there are errors of grammar and possibly content that I did not discover before finalizing the note.      SIGNATURES:  Eric Hardy    CC:  Manan Quiñonez M.D.  No  ref. provider found

## 2019-01-07 NOTE — PROGRESS NOTES
"Pharmacy Chemotherapy Verification    Patient Name: Karlene Walters      Dx: Colon CA        Protocol: 5-FU + Leucovorin      *Dosing Reference*   IV over 2 hours on Day 1, followed by    IVP on Day 1, followed by         -- 10/31/17: delete Leucovorin and 5-FU push d/t neutropenia per Dr. Hardy   Fluorouracil   IV continuous infusion daily on Days 1 and 2 ( IV over 46-48 hours)        -- 20% reduction (1920 mg/m²) to be continued starting C28 per C Alsop APRN   14 day cycles for 12 cycles (adjuvant) or until disease progression or unacceptable toxicity  NCCN Guidelines for Colon Cancer. V.2.2015.  Jay T, et al. Eur J Cancer.1999;35(9):1343-7.      Allergies:  Codeine; Oxaliplatin; Pcn; Sulfa drugs; and Tape       /78   Pulse (!) 103   Temp 36.6 °C (97.9 °F) (Temporal)   Resp 18   Ht 1.65 m (5' 4.96\")   Wt 78.1 kg (172 lb 2.9 oz)   LMP  (LMP Unknown)   SpO2 96%   BMI 28.69 kg/m²  Body surface area is 1.89 meters squared.    ANC~ 2330 Plt = 220k   Hgb = 12.4     SCr = 0.93mg/dL CrCl ~ 61mL/min   LFT's = WNL, except AP = 160 TBili = 0.5     Drug Order   (Drug name, dose, route, IV Fluid & volume, frequency, number of doses) Cycle 74 (C66 in [place])   Previous treatment: C73 = 12/25/18   Medication = Fluorouracil (5-FU)   Base Dose = 1920 mg/m²  Calc Dose:Base Dose x 1.89m² = 3628.8mg  Final Dose = 3630mg   Route = CIVI  Drug Conc = 50 mg/mL  Fluid & Volume = 72.6mL (+ 3 mL overfill)  Admin Duration = CIVI over 46 hrs @ 1.6 mL/hr   Via CADD pump for home infusion        <5% difference, OK to treat with final dose     By my signature below, I confirm this process was performed independently with the BSA and all final chemotherapy dosing calculations congruent. I have reviewed the above chemotherapy order and that my calculation of the final dose and BSA (when applicable) corroborate those calculations of the  pharmacist.     Zhao SalinasD.        "

## 2019-01-08 ENCOUNTER — OUTPATIENT INFUSION SERVICES (OUTPATIENT)
Dept: ONCOLOGY | Facility: MEDICAL CENTER | Age: 79
End: 2019-01-08
Attending: INTERNAL MEDICINE
Payer: MEDICARE

## 2019-01-08 VITALS
WEIGHT: 172.18 LBS | OXYGEN SATURATION: 96 % | HEART RATE: 103 BPM | TEMPERATURE: 97.9 F | HEIGHT: 65 IN | RESPIRATION RATE: 18 BRPM | SYSTOLIC BLOOD PRESSURE: 145 MMHG | DIASTOLIC BLOOD PRESSURE: 78 MMHG | BODY MASS INDEX: 28.69 KG/M2

## 2019-01-08 DIAGNOSIS — C20 RECTAL CANCER (HCC): ICD-10-CM

## 2019-01-08 DIAGNOSIS — R94.4 DECREASED GFR: ICD-10-CM

## 2019-01-08 LAB
ALBUMIN SERPL BCP-MCNC: 3.4 G/DL (ref 3.2–4.9)
ALBUMIN/GLOB SERPL: 1.1 G/DL
ALP SERPL-CCNC: 160 U/L (ref 30–99)
ALT SERPL-CCNC: 24 U/L (ref 2–50)
ANION GAP SERPL CALC-SCNC: 8 MMOL/L (ref 0–11.9)
AST SERPL-CCNC: 31 U/L (ref 12–45)
BASOPHILS # BLD AUTO: 1.5 % (ref 0–1.8)
BASOPHILS # BLD: 0.07 K/UL (ref 0–0.12)
BILIRUB SERPL-MCNC: 0.5 MG/DL (ref 0.1–1.5)
BUN SERPL-MCNC: 17 MG/DL (ref 8–22)
CALCIUM SERPL-MCNC: 9.5 MG/DL (ref 8.5–10.5)
CEA SERPL-MCNC: 1.5 NG/ML (ref 0–3)
CHLORIDE SERPL-SCNC: 107 MMOL/L (ref 96–112)
CO2 SERPL-SCNC: 23 MMOL/L (ref 20–33)
CREAT SERPL-MCNC: 0.93 MG/DL (ref 0.5–1.4)
EOSINOPHIL # BLD AUTO: 0.27 K/UL (ref 0–0.51)
EOSINOPHIL NFR BLD: 5.9 % (ref 0–6.9)
ERYTHROCYTE [DISTWIDTH] IN BLOOD BY AUTOMATED COUNT: 56.7 FL (ref 35.9–50)
GLOBULIN SER CALC-MCNC: 3 G/DL (ref 1.9–3.5)
GLUCOSE SERPL-MCNC: 97 MG/DL (ref 65–99)
HCT VFR BLD AUTO: 39.9 % (ref 37–47)
HGB BLD-MCNC: 12.4 G/DL (ref 12–16)
IMM GRANULOCYTES # BLD AUTO: 0.01 K/UL (ref 0–0.11)
IMM GRANULOCYTES NFR BLD AUTO: 0.2 % (ref 0–0.9)
LYMPHOCYTES # BLD AUTO: 1.24 K/UL (ref 1–4.8)
LYMPHOCYTES NFR BLD: 26.9 % (ref 22–41)
MCH RBC QN AUTO: 27 PG (ref 27–33)
MCHC RBC AUTO-ENTMCNC: 31.1 G/DL (ref 33.6–35)
MCV RBC AUTO: 86.9 FL (ref 81.4–97.8)
MONOCYTES # BLD AUTO: 0.69 K/UL (ref 0–0.85)
MONOCYTES NFR BLD AUTO: 15 % (ref 0–13.4)
NEUTROPHILS # BLD AUTO: 2.33 K/UL (ref 2–7.15)
NEUTROPHILS NFR BLD: 50.5 % (ref 44–72)
NRBC # BLD AUTO: 0 K/UL
NRBC BLD-RTO: 0 /100 WBC
PLATELET # BLD AUTO: 220 K/UL (ref 164–446)
PMV BLD AUTO: 10.1 FL (ref 9–12.9)
POTASSIUM SERPL-SCNC: 3.9 MMOL/L (ref 3.6–5.5)
PROT SERPL-MCNC: 6.4 G/DL (ref 6–8.2)
RBC # BLD AUTO: 4.59 M/UL (ref 4.2–5.4)
SODIUM SERPL-SCNC: 138 MMOL/L (ref 135–145)
WBC # BLD AUTO: 4.6 K/UL (ref 4.8–10.8)

## 2019-01-08 PROCEDURE — 96374 THER/PROPH/DIAG INJ IV PUSH: CPT | Mod: XU

## 2019-01-08 PROCEDURE — 82378 CARCINOEMBRYONIC ANTIGEN: CPT

## 2019-01-08 PROCEDURE — 80053 COMPREHEN METABOLIC PANEL: CPT

## 2019-01-08 PROCEDURE — 96375 TX/PRO/DX INJ NEW DRUG ADDON: CPT

## 2019-01-08 PROCEDURE — A4212 NON CORING NEEDLE OR STYLET: HCPCS

## 2019-01-08 PROCEDURE — 700111 HCHG RX REV CODE 636 W/ 250 OVERRIDE (IP): Performed by: INTERNAL MEDICINE

## 2019-01-08 PROCEDURE — 85025 COMPLETE CBC W/AUTO DIFF WBC: CPT

## 2019-01-08 PROCEDURE — G0498 CHEMO EXTEND IV INFUS W/PUMP: HCPCS

## 2019-01-08 RX ORDER — DEXAMETHASONE SODIUM PHOSPHATE 4 MG/ML
8 INJECTION, SOLUTION INTRA-ARTICULAR; INTRALESIONAL; INTRAMUSCULAR; INTRAVENOUS; SOFT TISSUE ONCE
Status: COMPLETED | OUTPATIENT
Start: 2019-01-08 | End: 2019-01-08

## 2019-01-08 RX ADMIN — DEXAMETHASONE SODIUM PHOSPHATE 8 MG: 4 INJECTION, SOLUTION INTRA-ARTICULAR; INTRALESIONAL; INTRAMUSCULAR; INTRAVENOUS; SOFT TISSUE at 07:59

## 2019-01-08 RX ADMIN — FLUOROURACIL 3630 MG: 50 INJECTION, SOLUTION INTRAVENOUS at 08:29

## 2019-01-08 ASSESSMENT — PAIN SCALES - GENERAL: PAINLEVEL_OUTOF10: 0

## 2019-01-08 NOTE — PROGRESS NOTES
Chemotherapy Verification - SECONDARY RN       Height = 64.96 inches  Weight = 78.1 kg  BSA = 1.89 m2       Medication: Fluorouracil (Adrucil)  Dose: 1920 mg/m2  Calculated Dose: 3,628.8 mg via CADD pump for CIVI over 46 hrs                             (In mg/m2, AUC, mg/kg)         I confirm that this process was performed independently.

## 2019-01-08 NOTE — PROGRESS NOTES
Chemotherapy Verification - SECONDARY RN       Height = 64.96 in  Weight = 78.1 kg  BSA = 1.89 m2       Medication: 5 FU pump  Dose: 1920 mg/m2 over 46 hours  Calculated Dose: 3628.8 mg over 46 hours                             (In mg/m2, AUC, mg/kg)     I confirm that this process was performed independently.

## 2019-01-09 ENCOUNTER — OFFICE VISIT (OUTPATIENT)
Dept: PULMONOLOGY | Facility: HOSPICE | Age: 79
End: 2019-01-09
Payer: MEDICARE

## 2019-01-09 VITALS
HEIGHT: 65 IN | BODY MASS INDEX: 29.32 KG/M2 | TEMPERATURE: 97.7 F | RESPIRATION RATE: 16 BRPM | DIASTOLIC BLOOD PRESSURE: 82 MMHG | SYSTOLIC BLOOD PRESSURE: 104 MMHG | HEART RATE: 86 BPM | WEIGHT: 176 LBS | OXYGEN SATURATION: 97 %

## 2019-01-09 DIAGNOSIS — Z78.9 NONSMOKER: ICD-10-CM

## 2019-01-09 DIAGNOSIS — I10 ESSENTIAL HYPERTENSION: ICD-10-CM

## 2019-01-09 DIAGNOSIS — R06.02 SHORTNESS OF BREATH: ICD-10-CM

## 2019-01-09 DIAGNOSIS — C20 RECTAL CANCER (HCC): ICD-10-CM

## 2019-01-09 DIAGNOSIS — J43.9 PULMONARY EMPHYSEMA, UNSPECIFIED EMPHYSEMA TYPE (HCC): Chronic | ICD-10-CM

## 2019-01-09 DIAGNOSIS — C78.7 SECONDARY MALIGNANT NEOPLASM OF LIVER (HCC): ICD-10-CM

## 2019-01-09 DIAGNOSIS — Z86.711 HISTORY OF PULMONARY EMBOLISM: ICD-10-CM

## 2019-01-09 DIAGNOSIS — J44.9 CHRONIC OBSTRUCTIVE PULMONARY DISEASE, UNSPECIFIED COPD TYPE (HCC): ICD-10-CM

## 2019-01-09 PROCEDURE — 99214 OFFICE O/P EST MOD 30 MIN: CPT | Performed by: NURSE PRACTITIONER

## 2019-01-09 NOTE — PROGRESS NOTES
Chief Complaint   Patient presents with   • COPD       HPI:  Karlene Walters is a 78 y.o. year old female here today for follow-up on COPD per Dr. Warren's prior assessment, but no hx of smoking - most likely chronic obstructive asthma. Hx of PE and on Xarelto chronically. History of rectal cancer with metastasis to the liver -patient is currently under the care of Dr. Hardy. Recent PET scan 10/25/2018 indicated no uptake in the lung area.  No nodules.  Hypermetabolic 2.5 cm lesion adjacent/to the right of the low-attenuation treatment bed in the hepatic dome, in keeping with persistent residual disease next to the treatment bed.  She continues to receive chemotherapy. She has had complaints of dyspnea on exertion with no clear etiology. CT scan of chest 6/2018 showed no evidence of PE or masses or nodules. No metastasis. Hx of PE remains on Xarelto 10mg daily. PFT indicated normal spirometry 7/11/18 FEV1 2.43L or 115%, DLCO 98%. Echo indicated normal LV function. She has never smoked. HRCT of chest was performed previously  to rule out ILD especially in light of her history of cancer and chemotherapy. This indicated emphysematous lungs and some areas of basilar atelectasis but no evidence of ILD. She was started on Stiolto 2 puffs QD and MATEO. She feels her breathing has improved overall. We reviewed appropriate MATEO use such as before going to the grocery store/mall. She rarely uses it. She has a mild cough with rare clear phlegm in the AM and no wheezing. She notes chronic postnasal drip. She overall feels well but fatigued from the chemotherapy.     She is anticipating trip to [facility] in March.       ROS: As per HPI and otherwise negative if not stated.    Past Medical History:   Diagnosis Date   • Arrhythmia     occasional   • Blood clotting disorder (HCC) 08/2015    bilat PE    • Blood transfusion 1957   • Breath shortness     no O2 with moderate exertion   • Cancer (HCC) 09/2012    rectal cancer,  Liver  CA-2015   • CATARACT     removed b/l   • Chemotherapy-induced neutropenia (HCC) 9/28/2016   • Chickenpox    • Colon cancer (HCC)    • Dental disorder     dentures UPPERS AND LOWERS   • Elevated alkaline phosphatase level 11/15/2017   • Emphysema of lung (HCC)    • Fall    • Syriac measles    • Heart murmur     as a child   • Hemorrhagic disorder (HCC)     on Xarelto    • Hypercholesteremia    • Hypertension     no meds currently    • Ileostomy in place (HCC)    • Indigestion    • Influenza    • Lightheadedness 9/17/2015   • Mumps    • Obstruction     ileostomy   • Other specified symptom associated with female genital organs     HYSTERECTOMY 1993   • Pneumonia 05/2017    tx'd with antibx   • Pulmonary embolism (HCC)    • 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)    • Renal insufficiency 3/14/2016   • Routine general medical examination at a health care facility 3/14/2016    3/14/16   • Seasonal allergies    • Swelling of both ankles     Lasix PRN   • Tonsillitis        Past Surgical History:   Procedure Laterality Date   • HEPATIC ABLATION LAPAROSCOPIC  11/15/2018    Procedure: HEPATIC ABLATION LAPAROSCOPIC.- SEGMENT 7/8;  Surgeon: Kit West M.D.;  Location: SURGERY [facility];  Service: General   • COLONOSCOPY WITH BIOPSY  8/23/2015    Procedure: COLONOSCOPY WITH BIOPSY;  Surgeon: Wilbur Glasgow M.D.;  Location: [facility];  Service:    • HEPATIC ABLATION LAPAROSCOPIC  7/6/2015    Procedure: HEPATIC ABLATION LAPAROSCOPIC.;  Surgeon: Kit West M.D.;  Location: SURGERY [facility];  Service:    • CATH PLACEMENT Left 7/6/2015    Procedure: CATH PLACEMENT CEPHALIC POWERPORT;  Surgeon: Kit West M.D.;  Location: SURGERY [facility];  Service:    • FISTULA IN ANO REPAIR  1/28/2015    Performed by Kolton Montgomery M.D. at [facility]   • PERINEAL PROCEDURE  1/28/2015    Performed by Kolton Montgomery M.D. at [facility]   •  FISTULA IN ANO REPAIR  10/15/2014    Performed by Kolton Montgomery M.D. at SURGERY [facility]   • PERINEAL PROCEDURE  10/15/2014    Performed by Kolton Montgomery M.D. at [facility]   • IRRIGATION & DEBRIDEMENT GENERAL  2/19/2014    Performed by Kolton Montgomery M.D. at [facility]   • FLAP GRAFT  2/19/2014    Performed by Kolton Montgomery M.D. at SURGERY [facility]   • PERINEAL PROCEDURE  12/18/2013    Performed by Kolton Montgomery M.D. at [facility]   • MYOCUTANEOUS FLAP  12/18/2013    Performed by Kolton Montgomery M.D. at SURGERY [facility]   • IRRIGATION & DEBRIDEMENT GENERAL  10/23/2013    Performed by Kolton Montgomery M.D. at [facility]   • FISTULA IN ANO REPAIR  9/4/2013    Performed by Kolton Montgomery M.D. at SURGERY [facility]   • FLAP ROTATION  9/4/2013    Performed by Kolton Montgomery M.D. at [facility]   • RECOVERY  8/26/2013    Performed by [facility]Rec[facility] Surgery at [facility] SAME DAY [facility]   • BIOPSY GENERAL  7/17/2013    Performed by Kolton Montgomery M.D. at [facility]   • PROCTOSCOPY  7/17/2013    Performed by Kolton Montgomery M.D. at [facility]   • FISTULA IN ANO REPAIR  2/27/2013    Performed by Kolton Montgomery M.D. at SURGERY [facility]   • SIGMOIDOSCOPY  2/27/2013    Performed by Kolton Montgomery M.D. at [facility]   • LAPAROSCOPY  10/8/2012    Performed by Kolton Montgomery M.D. at [facility]   • ILEO LOOP DIVERSION  10/8/2012    Performed by Kolton Montgomery M.D. at [facility]   • COLECTOMY  9/26/2012    Performed by Kolton Montgomery M.D. at [facility]   • COLONOSCOPY FLEX W/ENDO US  9/20/2012    Performed by Harshil Bolton M.D. at SURGERY [facility]   • OTHER  2009    left eye torn retina repair   • CATARACT EXTRACTION WITH IOL  2004    bilateral   • ABDOMINAL HYSTERECTOMY TOTAL  1983   • CYST EXCISION  1972    ovarian   • COLON RESECTION     • EYE SURGERY       "cataracts   • HYSTERECTOMY LAPAROSCOPY     • PB REMV 2ND CATARACT,CORN-SCLER SECTN     • TONSILLECTOMY         Family History   Problem Relation Age of Onset   • Heart Disease Mother    • Heart Failure Mother    • Hypertension Mother    • Hyperlipidemia Mother    • Cancer Mother    • Cancer Maternal Aunt 60        breast ca   • Lung Disease Brother         COPD/Emphysema/Smoker   • Cancer Brother         prostate cancer   • Alcohol/Drug Brother         Alcohol   • Kidney Disease Father         renal failure       Social History     Social History   • Marital status:      Spouse name: N/A   • Number of children: N/A   • Years of education: N/A     Occupational History   • Not on file.     Social History Main Topics   • Smoking status: Never Smoker   • Smokeless tobacco: Never Used   • Alcohol use No      Comment: rare   • Drug use: No   • Sexual activity: No     Other Topics Concern   • Not on file     Social History Narrative   • No narrative on file       Allergies as of 01/09/2019 - Reviewed 01/09/2019   Allergen Reaction Noted   • Oxaliplatin Anaphylaxis 10/27/2015   • Codeine  04/05/2011   • Pcn [penicillins] Itching 04/05/2011   • Sulfa drugs Itching 04/05/2011   • Tape Rash 09/04/2013        @Vital signs for this encounter:  Vitals:    01/09/19 0925 01/09/19 0930   Height: 1.65 m (5' 4.96\")    Weight: 79.8 kg (176 lb)    Weight % change since last entry.: 0 %    BP: 104/82    Pulse: 86    BMI (Calculated): 29.32    Resp: 16    Temp: 36.5 °C (97.7 °F)    TempSrc: Temporal    O2 sat % room air:  97 %       Current medications as of today   Current Outpatient Prescriptions   Medication Sig Dispense Refill   • rivaroxaban (XARELTO) 10 MG Tab tablet Take 10 mg by mouth.     • Tiotropium Bromide-Olodaterol (STIOLTO RESPIMAT) 2.5-2.5 MCG/ACT Aero Soln Take 2 Puffs by mouth every day. 1 Inhaler 11   • raNITidine (ZANTAC) 150 MG Tab TAKE 1 TABLET 2 TIMES A DAY 60 Tab 3   • metronidazole (METROCREAM) 0.75 % cream " Apply 1 Each to affected area(s) 2 times a day.     • loperamide (IMODIUM) 2 MG Cap Take 2 mg by mouth 4 times a day as needed for Diarrhea.     • lidocaine-prilocaine (EMLA) 2.5-2.5 % Cream APPLY A THICK FILM OVER THE PORT ACCESS SITE PRIOR TO ACCESS 30 g 2   • ondansetron (ZOFRAN) 4 MG Tab tablet Take 4 mg by mouth as needed.     • cyanocobalamin (VITAMIN B-12) 500 MCG Tab Take 500 mcg by mouth every day.     • Multiple Vitamins-Minerals (CENTRUM SILVER PO) Take 1 Tab by mouth every day.     • Cholecalciferol (VITAMIN D3) 400 UNITS CAPS Take 1 Cap by mouth every day.     • loratadine (CLARITIN) 10 MG TABS Take 10 mg by mouth every day. Indications: Hayfever     • oxyCODONE immediate-release (ROXICODONE) 5 MG Tab Take 1 Tab by mouth every 8 hours as needed for Severe Pain for up to 45 doses. 45 Tab 0   • albuterol (PROAIR HFA) 108 (90 Base) MCG/ACT Aero Soln inhalation aerosol Inhale 2 Puffs by mouth every four hours as needed for Shortness of Breath (wheezing). 1 Inhaler 11   • furosemide (LASIX) 40 MG Tab Take 1 Tab by mouth every day. 90 Tab 3   • NON SPECIFIED by Intravenous route every 14 days. Chemo 5 FU     • isosorbide mononitrate SR (IMDUR) 30 MG TABLET SR 24 HR Take 1 Tab by mouth every morning. (Patient taking differently: Take 30 mg by mouth. Take day of Chem and following day) 30 Tab 3   • potassium chloride ER (KLOR-CON) 10 MEQ tablet TAKE ONE TABLET EVERY DAY AS NEEDED 30 Tab 6     No current facility-administered medications for this visit.          Physical Exam:   Gen:           Alert and oriented, No apparent distress. Mood and affect appropriate, normal interaction with examiner.  Eyes:          PERRL, EOM intact, sclere white, conjunctive moist.  Ears:          Not examined.   Hearing:     Grossly intact.  Nose:          Normal, no lesions or deformities.  Dentition:    Good dentition.  Oropharynx:   Tongue normal, posterior pharynx without erythema or exudate.  Mallampati Classification:  2/3  Neck:        Supple, trachea midline, no masses.  Respiratory Effort: No intercostal retractions or use of accessory muscles.   Lung Auscultation:      Clear to auscultation bilaterally; no rales, rhonchi or wheezing.  CV:            Regular rate and rhythm. No murmurs, rubs or gallops.  Abd:           Not examined.   Lymphadenopathy: Not examined.  Gait and Station: Normal.  Digits and Nails: No clubbing, cyanosis, petechiae, or nodes.   Cranial Nerves: II-XII grossly intact.  Skin:        No rashes, lesions or ulcers noted.               Ext:           No cyanosis but slight nonpitting edema.      Assessment:  1. Pulmonary emphysema, unspecified emphysema type (HCC)     2. Rectal cancer (HCC)     3. Secondary malignant neoplasm of liver (HCC)     4. History of pulmonary embolism     5. Essential hypertension     6. BMI 29.0-29.9,adult     7. Nonsmoker         Immunizations:    Flu:11/2018  Pneumovax 23:2006  Prevnar 13:2018    Plan:  1.  Continue inhaler regimen.  Reviewed appropriate albuterol HFA inhaler use prior to activities or some shortness of breath.  2.  Discussed respiratory hygiene.  3.  Encouraged slow weight loss of no more than 10 pounds.  4.  Follow-up in 6 months with PFT to check symptoms, sooner if needed.    Please note that this dictation was created using voice recognition software. I have made every reasonable attempt to correct obvious errors, but it is possible there are errors of grammar and possibly content that I did not discover before finalizing the note.

## 2019-01-10 ENCOUNTER — OUTPATIENT INFUSION SERVICES (OUTPATIENT)
Dept: ONCOLOGY | Facility: MEDICAL CENTER | Age: 79
End: 2019-01-10
Attending: INTERNAL MEDICINE
Payer: MEDICARE

## 2019-01-10 VITALS
TEMPERATURE: 97.6 F | OXYGEN SATURATION: 96 % | DIASTOLIC BLOOD PRESSURE: 59 MMHG | WEIGHT: 169.75 LBS | BODY MASS INDEX: 28.28 KG/M2 | HEIGHT: 65 IN | SYSTOLIC BLOOD PRESSURE: 119 MMHG | RESPIRATION RATE: 18 BRPM | HEART RATE: 104 BPM

## 2019-01-10 DIAGNOSIS — R94.4 DECREASED GFR: ICD-10-CM

## 2019-01-10 DIAGNOSIS — C20 RECTAL CANCER (HCC): ICD-10-CM

## 2019-01-10 PROCEDURE — 96523 IRRIG DRUG DELIVERY DEVICE: CPT

## 2019-01-10 PROCEDURE — 700111 HCHG RX REV CODE 636 W/ 250 OVERRIDE (IP)

## 2019-01-10 RX ADMIN — SODIUM CHLORIDE, PRESERVATIVE FREE 500 UNITS: 5 INJECTION INTRAVENOUS at 08:07

## 2019-01-10 ASSESSMENT — PAIN SCALES - GENERAL: PAINLEVEL_OUTOF10: 0

## 2019-01-10 NOTE — PROGRESS NOTES
Pt returns for pump deaccess. Pump settings reviewed and pt received total dose of chemo. Pump removed, port flushed, and blood return observed. Heparin instilled and needle removed, tip intact. Gauze dressing applied. Pt knows when to return. Discharged home under self care in no apparent distress.

## 2019-01-14 DIAGNOSIS — C20 RECTAL CANCER (HCC): ICD-10-CM

## 2019-01-14 RX ORDER — ONDANSETRON 4 MG/1
4 TABLET, FILM COATED ORAL PRN
Qty: 30 TAB | Refills: 3 | Status: SHIPPED | OUTPATIENT
Start: 2019-01-14 | End: 2020-08-11 | Stop reason: SDUPTHER

## 2019-01-14 NOTE — TELEPHONE ENCOUNTER
Was the patient seen in the last year in this department? Yes    Does patient have an active prescription for medications requested? No     Received Request Via: Pharmacy     Thank you.

## 2019-01-22 ENCOUNTER — OUTPATIENT INFUSION SERVICES (OUTPATIENT)
Dept: ONCOLOGY | Facility: MEDICAL CENTER | Age: 79
End: 2019-01-22
Attending: INTERNAL MEDICINE
Payer: MEDICARE

## 2019-01-22 VITALS
RESPIRATION RATE: 18 BRPM | SYSTOLIC BLOOD PRESSURE: 115 MMHG | HEIGHT: 65 IN | HEART RATE: 115 BPM | OXYGEN SATURATION: 100 % | BODY MASS INDEX: 28.06 KG/M2 | DIASTOLIC BLOOD PRESSURE: 72 MMHG | TEMPERATURE: 97.1 F | WEIGHT: 168.43 LBS

## 2019-01-22 DIAGNOSIS — R94.4 DECREASED GFR: ICD-10-CM

## 2019-01-22 DIAGNOSIS — C20 RECTAL CANCER (HCC): ICD-10-CM

## 2019-01-22 LAB
BASOPHILS # BLD AUTO: 0.7 % (ref 0–1.8)
BASOPHILS # BLD: 0.05 K/UL (ref 0–0.12)
EOSINOPHIL # BLD AUTO: 0.14 K/UL (ref 0–0.51)
EOSINOPHIL NFR BLD: 2 % (ref 0–6.9)
ERYTHROCYTE [DISTWIDTH] IN BLOOD BY AUTOMATED COUNT: 60 FL (ref 35.9–50)
HCT VFR BLD AUTO: 40.7 % (ref 37–47)
HGB BLD-MCNC: 12.6 G/DL (ref 12–16)
IMM GRANULOCYTES # BLD AUTO: 0.03 K/UL (ref 0–0.11)
IMM GRANULOCYTES NFR BLD AUTO: 0.4 % (ref 0–0.9)
LYMPHOCYTES # BLD AUTO: 1.03 K/UL (ref 1–4.8)
LYMPHOCYTES NFR BLD: 14.7 % (ref 22–41)
MCH RBC QN AUTO: 27.2 PG (ref 27–33)
MCHC RBC AUTO-ENTMCNC: 31 G/DL (ref 33.6–35)
MCV RBC AUTO: 87.7 FL (ref 81.4–97.8)
MONOCYTES # BLD AUTO: 0.9 K/UL (ref 0–0.85)
MONOCYTES NFR BLD AUTO: 12.8 % (ref 0–13.4)
NEUTROPHILS # BLD AUTO: 4.88 K/UL (ref 2–7.15)
NEUTROPHILS NFR BLD: 69.4 % (ref 44–72)
NRBC # BLD AUTO: 0 K/UL
NRBC BLD-RTO: 0 /100 WBC
PLATELET # BLD AUTO: 261 K/UL (ref 164–446)
PMV BLD AUTO: 9.8 FL (ref 9–12.9)
RBC # BLD AUTO: 4.64 M/UL (ref 4.2–5.4)
WBC # BLD AUTO: 7 K/UL (ref 4.8–10.8)

## 2019-01-22 PROCEDURE — 96374 THER/PROPH/DIAG INJ IV PUSH: CPT | Mod: XU

## 2019-01-22 PROCEDURE — G0498 CHEMO EXTEND IV INFUS W/PUMP: HCPCS

## 2019-01-22 PROCEDURE — 700111 HCHG RX REV CODE 636 W/ 250 OVERRIDE (IP): Performed by: INTERNAL MEDICINE

## 2019-01-22 PROCEDURE — A4212 NON CORING NEEDLE OR STYLET: HCPCS

## 2019-01-22 PROCEDURE — 96375 TX/PRO/DX INJ NEW DRUG ADDON: CPT

## 2019-01-22 PROCEDURE — 85025 COMPLETE CBC W/AUTO DIFF WBC: CPT

## 2019-01-22 RX ORDER — DEXAMETHASONE SODIUM PHOSPHATE 4 MG/ML
8 INJECTION, SOLUTION INTRA-ARTICULAR; INTRALESIONAL; INTRAMUSCULAR; INTRAVENOUS; SOFT TISSUE ONCE
Status: COMPLETED | OUTPATIENT
Start: 2019-01-22 | End: 2019-01-22

## 2019-01-22 RX ADMIN — FLUOROURACIL 3590 MG: 50 INJECTION, SOLUTION INTRAVENOUS at 10:44

## 2019-01-22 RX ADMIN — DEXAMETHASONE SODIUM PHOSPHATE 8 MG: 4 INJECTION, SOLUTION INTRA-ARTICULAR; INTRALESIONAL; INTRAMUSCULAR; INTRAVENOUS; SOFT TISSUE at 10:09

## 2019-01-22 ASSESSMENT — PAIN SCALES - GENERAL: PAINLEVEL_OUTOF10: 0

## 2019-01-22 NOTE — PROGRESS NOTES
Patient arrived for Day 1 Cycle 67; reports no changes or concerns.  Port accessed in sterile field, flushed and patent. CBC drawn per order. Results within parameters.  Pre-med given; CADD pump connected and verified running by second RN.  Confirmed pt's de-access appt on Thursday.  Pt discharged home in good spirits under no apparent distress.

## 2019-01-22 NOTE — PROGRESS NOTES
"Pharmacy Chemotherapy Verification    Patient Name: Karlene Walters      Dx: Colon CA        Protocol: 5-FU + Leucovorin      *Dosing Reference*   IV over 2 hours on Day 1, followed by    IVP on Day 1, followed by         -- 10/31/17: delete Leucovorin and 5-FU push d/t neutropenia per Dr. Hardy   Fluorouracil   IV continuous infusion daily on Days 1 and 2 ( IV over 46-48 hours)        -- 20% reduction (1920 mg/m²) to be continued starting C28 per C Alsop APRN   14 day cycles for 12 cycles (adjuvant) or until disease progression or unacceptable toxicity  NCCN Guidelines for Colon Cancer. V.2.2015.  Jay T, et al. Eur J Cancer.1999;35(9):1343-7.      Allergies:  Codeine; Oxaliplatin; Pcn; Sulfa drugs; and Tape       /72   Pulse (!) 115   Temp 36.2 °C (97.1 °F) (Temporal)   Resp 18   Ht 1.65 m (5' 4.96\")   Wt 76.4 kg (168 lb 6.9 oz)   LMP  (LMP Unknown)   SpO2 100%   BMI 28.06 kg/m²  Body surface area is 1.87 meters squared.    All lab results within treatment parameters.    Drug Order   (Drug name, dose, route, IV Fluid & volume, frequency, number of doses) Cycle 75 (C67 in [place])   Previous treatment: C74 = 1/8/19   Medication = Fluorouracil (5-FU)   Base Dose = 1920 mg/m²  Calc Dose:Base Dose x 1.87m² = 3590mg  Final Dose = 3590mg   Route = CIVI  Drug Conc = 50 mg/mL  Fluid & Volume = 71.8mL (+ 3 mL overfill)  Admin Duration = CIVI over 46 hrs @ 1.6 mL/hr   Via CADD pump for home infusion        <5% difference, OK to treat with final dose     By my signature below, I confirm this process was performed independently with the BSA and all final chemotherapy dosing calculations congruent. I have reviewed the above chemotherapy order and that my calculation of the final dose and BSA (when applicable) corroborate those calculations of the  pharmacist.     ADRIN Arroyo PharmAngelaD.        "

## 2019-01-22 NOTE — PROGRESS NOTES
Chemotherapy Verification - SECONDARY RN       Height = 64.96 in  Weight = 76.4 kg  BSA = 1.87 m2       Medication: 5 FU pump  Dose: 1920 mg/m2 over 46 hours  Calculated Dose: 3590.4 mg over 46 hours                             (In mg/m2, AUC, mg/kg)     I confirm that this process was performed independently.

## 2019-01-22 NOTE — PROGRESS NOTES
Chemotherapy Verification - PRIMARY RN      Height = 165cm  Weight = 76.4kg  BSA = 1.87m2       Medication: 5FU CADD pump Dose: 1920mg/m2  Calculated Dose: 3590.4mg over 4 hours                             (In mg/m2, AUC, mg/kg)     I confirm this process was performed independently with the BSA and all final chemotherapy dosing calculations congruent.  Any discrepancies of 5% or greater have been addressed with the chemotherapy pharmacist. The resolution of the discrepancy has been documented in the EPIC progress notes.

## 2019-01-22 NOTE — PROGRESS NOTES
"Pharmacy Chemotherapy Verification      Dx: Colon CA        Cycle 75 ([place] cycle 67)  Previous treatment: 1/8/19    Protocol: 5-FU+Leucovorin      *Dosing Reference*   IV over 2 hours on Day 1, followed by   IVP on Day 1, followed by     - Dose reduced by 20% to  starting with Cycle 18 ([place] #11) on 7/5/16 due to neutropenia     - 10/31/17: delete Leucovorin and 5-FU push d/t neutropenia per Dr. Hardy   Fluorouracil   IV continuous infusion daily on Days 1 and 2 ( IV over 46-48 hours) -     - Dose reduced by 20% starting with Cycle 18 ([place] #11) on 7/5/16 due to neutropenia    - 20% reduction (1920 mg/m2) to be continued starting C28 per C Alsop APRN   14 day cycles for 12 cycles (adjuvant) or until disease progression or unacceptable toxicity  NCCN Guidelines for Colon Cancer. V.2.2015.  Jay GROVER, et al. Eur J Cancer.1999;35(9):1343-7.      Allergies:  Codeine; Oxaliplatin; Pcn; Sulfa drugs; and Tape     /72   Pulse (!) 115   Temp 36.2 °C (97.1 °F) (Temporal)   Resp 18   Ht 1.65 m (5' 4.96\")   Wt 76.4 kg (168 lb 6.9 oz)   LMP  (LMP Unknown)   SpO2 100%   BMI 28.06 kg/m²  Body surface area is 1.87 meters squared.     Labs 1/22/19:  ANC~ 4880   Plt = 261 k     Hgb = 12.6    1/8/19  SCr = 0.93 mg/dL  CrCl ~ 60 mL/min   AST/ALT/AP = 31/24/160 (no adjustments recommended) TBili = 0.5     Fluorouracil (5-FU) 1920 mg/m² x 1.87 m² = 3590 mg              <5% difference, OK to treat with final dose = 3590 mg CIVI over 46 hours Via home infusion pump at 1.6 mL/hr    Norris Mitchell, PharmD  "

## 2019-01-23 ENCOUNTER — NON-PROVIDER VISIT (OUTPATIENT)
Dept: WOUND CARE | Facility: MEDICAL CENTER | Age: 79
End: 2019-01-23
Attending: COLON & RECTAL SURGERY
Payer: MEDICARE

## 2019-01-23 PROCEDURE — 99211 OFF/OP EST MAY X REQ PHY/QHP: CPT

## 2019-01-24 ENCOUNTER — OUTPATIENT INFUSION SERVICES (OUTPATIENT)
Dept: ONCOLOGY | Facility: MEDICAL CENTER | Age: 79
End: 2019-01-24
Attending: INTERNAL MEDICINE
Payer: MEDICARE

## 2019-01-24 VITALS
OXYGEN SATURATION: 100 % | WEIGHT: 173.5 LBS | HEIGHT: 65 IN | DIASTOLIC BLOOD PRESSURE: 77 MMHG | BODY MASS INDEX: 28.91 KG/M2 | HEART RATE: 103 BPM | SYSTOLIC BLOOD PRESSURE: 117 MMHG | TEMPERATURE: 97.9 F | RESPIRATION RATE: 18 BRPM

## 2019-01-24 PROCEDURE — 700111 HCHG RX REV CODE 636 W/ 250 OVERRIDE (IP)

## 2019-01-24 PROCEDURE — 96523 IRRIG DRUG DELIVERY DEVICE: CPT

## 2019-01-24 RX ADMIN — Medication 500 UNITS: at 11:44

## 2019-01-24 NOTE — CERTIFICATION
"Advanced Wound [facility]  [facility]  [address]  [place] [address]  [phone] Fax#    Initial Ostomy Evaluation  For 90 Day Certification Period:  1/23/19-4/23/19    Referring Provider:  Kolton Montgomery M.D.  Primary Provider: Manan Quiñonez M.D.  Consulting Providers:  Surgeon: Kolton Montgomery M.D.      Start of Care: 1/23/19  Reason for referral: R23.8 (ICD-10-CM) - Other skin changes; Z93.2 (ICD-10-CM) - Ileostomy status      SUBJECTIVE: \"My skin is so much better, I cut the wafer away from the blistering area.\"   HPI: 78 year old female presents for peristomal skin care & ostomy appliance fitting; she is currently on Chemotherapy due to mets to liver rectal cancer. She was previously seen at [facility] in 2014 for care. She had Stage I, T2 N0 rectal cancer located at 7 cm from the anal verge in 2012 and received her loop ileostomy. However, she did have metastasis to her liver. 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    Past Medical Hx:   Past Medical History:   Diagnosis Date   • Arrhythmia     occasional   • Blood clotting disorder (HCC) 08/2015    bilat PE    • Blood transfusion 1957   • Breath shortness     no O2 with moderate exertion   • Cancer (HCC) 09/2012    rectal cancer,  Liver CA-2015   • CATARACT     removed b/l   • Chemotherapy-induced neutropenia (HCC) 9/28/2016   • Chickenpox    • Colon cancer (HCC)    • Dental disorder     dentures UPPERS AND LOWERS   • Elevated alkaline phosphatase level 11/15/2017   • Emphysema of lung (HCC)    • Fall    • Serbian measles    • Heart murmur     as a child   • Hemorrhagic disorder (HCC)     on Xarelto    • Hypercholesteremia    • Hypertension     no meds currently    • Ileostomy in place (HCC)    • Indigestion    • Influenza    • Lightheadedness 9/17/2015   • Mumps    • Obstruction     ileostomy   • Other specified symptom " associated with female genital organs     HYSTERECTOMY 1993   • Pneumonia 05/2017    tx'd with antibx   • Pulmonary embolism (HCC)    • 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)    • Renal insufficiency 3/14/2016   • Routine general medical examination at a health care facility 3/14/2016    3/14/16   • Seasonal allergies    • Swelling of both ankles     Lasix PRN   • Tonsillitis      Surgical Hx:   Past Surgical History:   Procedure Laterality Date   • HEPATIC ABLATION LAPAROSCOPIC  11/15/2018    Procedure: HEPATIC ABLATION LAPAROSCOPIC.- SEGMENT 7/8;  Surgeon: Kit West M.D.;  Location: SURGERY [facility];  Service: General   • COLONOSCOPY WITH BIOPSY  8/23/2015    Procedure: COLONOSCOPY WITH BIOPSY;  Surgeon: Wilbur Glasgow M.D.;  Location: [facility];  Service:    • HEPATIC ABLATION LAPAROSCOPIC  7/6/2015    Procedure: HEPATIC ABLATION LAPAROSCOPIC.;  Surgeon: Kit West M.D.;  Location: SURGERY [facility];  Service:    • CATH PLACEMENT Left 7/6/2015    Procedure: CATH PLACEMENT CEPHALIC POWERPORT;  Surgeon: Kit West M.D.;  Location: SURGERY [facility];  Service:    • FISTULA IN ANO REPAIR  1/28/2015    Performed by Kolton Montgomery M.D. at SURGERY [facility]   • PERINEAL PROCEDURE  1/28/2015    Performed by Kolton Montgomery M.D. at [facility]   • FISTULA IN ANO REPAIR  10/15/2014    Performed by Kolton Montgomery M.D. at SURGERY [facility]   • PERINEAL PROCEDURE  10/15/2014    Performed by Kolton Montgomery M.D. at [facility]   • IRRIGATION & DEBRIDEMENT GENERAL  2/19/2014    Performed by Kolton Montgomery M.D. at [facility]   • FLAP GRAFT  2/19/2014    Performed by Kolton Montgomery M.D. at [facility]   • PERINEAL PROCEDURE  12/18/2013    Performed by Kolton Montgomery M.D. at [facility]   • MYOCUTANEOUS FLAP  12/18/2013    Performed by Kolton Montgomery M.D. at [facility]    • IRRIGATION & DEBRIDEMENT GENERAL  10/23/2013    Performed by Kolton Montgomery M.D. at SURGERY [facility]   • FISTULA IN ANO REPAIR  9/4/2013    Performed by Kolton Montgomery M.D. at SURGERY [facility]   • FLAP ROTATION  9/4/2013    Performed by Kolton Montgomery M.D. at SURGERY [facility]   • RECOVERY  8/26/2013    Performed by Cath-Recovery Surgery at SURGERY SAME DAY [facility]   • BIOPSY GENERAL  7/17/2013    Performed by Kolton Montgomery M.D. at SURGERY [facility]   • PROCTOSCOPY  7/17/2013    Performed by Kolton Montgomery M.D. at SURGERY [facility]   • FISTULA IN ANO REPAIR  2/27/2013    Performed by Kolton Montgomery M.D. at SURGERY [facility]   • SIGMOIDOSCOPY  2/27/2013    Performed by Kolton Montgomery M.D. at SURGERY [facility]   • LAPAROSCOPY  10/8/2012    Performed by Kolton Montgomery M.D. at SURGERY [facility]   • ILEO LOOP DIVERSION  10/8/2012    Performed by Kolton Montgomery M.D. at SURGERY [facility]   • COLECTOMY  9/26/2012    Performed by Kolton Montgomery M.D. at SURGERY [facility]   • COLONOSCOPY FLEX W/ENDO US  9/20/2012    Performed by Harshil Bolton M.D. at SURGERY [facility]   • OTHER  2009    left eye torn retina repair   • CATARACT EXTRACTION WITH IOL  2004    bilateral   • ABDOMINAL HYSTERECTOMY TOTAL  1983   • CYST EXCISION  1972    ovarian   • COLON RESECTION     • EYE SURGERY      cataracts   • HYSTERECTOMY LAPAROSCOPY     • PB REMV 2ND CATARACT,CORN-SCLER SECTN     • TONSILLECTOMY       Medications:   Current Outpatient Prescriptions on File Prior to Visit   Medication Sig Dispense Refill   • ondansetron (ZOFRAN) 4 MG Tab tablet Take 1 Tab by mouth as needed. 30 Tab 3   • rivaroxaban (XARELTO) 10 MG Tab tablet Take 10 mg by mouth.     • Tiotropium Bromide-Olodaterol (STIOLTO RESPIMAT) 2.5-2.5 MCG/ACT Aero Soln Take 2 Puffs by mouth every day. 1 Inhaler 11   • albuterol (PROAIR HFA) 108 (90 Base) MCG/ACT Aero Soln inhalation aerosol Inhale 2 Puffs by mouth  every four hours as needed for Shortness of Breath (wheezing). 1 Inhaler 11   • furosemide (LASIX) 40 MG Tab Take 1 Tab by mouth every day. 90 Tab 3   • raNITidine (ZANTAC) 150 MG Tab TAKE 1 TABLET 2 TIMES A DAY 60 Tab 3   • NON SPECIFIED by Intravenous route every 14 days. Chemo 5 FU     • isosorbide mononitrate SR (IMDUR) 30 MG TABLET SR 24 HR Take 1 Tab by mouth every morning. (Patient taking differently: Take 30 mg by mouth. Take day of Chem and following day) 30 Tab 3   • metronidazole (METROCREAM) 0.75 % cream Apply 1 Each to affected area(s) 2 times a day.     • potassium chloride ER (KLOR-CON) 10 MEQ tablet TAKE ONE TABLET EVERY DAY AS NEEDED 30 Tab 6   • loperamide (IMODIUM) 2 MG Cap Take 2 mg by mouth 4 times a day as needed for Diarrhea.     • lidocaine-prilocaine (EMLA) 2.5-2.5 % Cream APPLY A THICK FILM OVER THE PORT ACCESS SITE PRIOR TO ACCESS 30 g 2   • cyanocobalamin (VITAMIN B-12) 500 MCG Tab Take 500 mcg by mouth every day.     • Multiple Vitamins-Minerals (CENTRUM SILVER PO) Take 1 Tab by mouth every day.     • Cholecalciferol (VITAMIN D3) 400 UNITS CAPS Take 1 Cap by mouth every day.     • loratadine (CLARITIN) 10 MG TABS Take 10 mg by mouth every day. Indications: Hayfever       No current facility-administered medications on file prior to visit.      Allergies: Oxaliplatin; Codeine; Pcn [penicillins]; Sulfa drugs; and Tape  Social Hx:   Social History     Social History   • Marital status:      Spouse name: N/A   • Number of children: N/A   • Years of education: N/A     Occupational History   • Not on file.     Social History Main Topics   • Smoking status: Never Smoker   • Smokeless tobacco: Never Used   • Alcohol use No      Comment: rare   • Drug use: No   • Sexual activity: No     Other Topics Concern   • Not on file     Social History Narrative   • No narrative on file       OBJECTIVE:     STOMA ASSESSMENT:   Type:  _x___loop Ileostomy (no reversal planned)     ____Colostomy     "____Urostomy    ____Other     Location: RLQ     Size: 1 5/8\"   Shape: slightly oval   Color: moist red   Protrudes:      _x__ >1 inch               ___ <1 inch (however patient states does go flush)   [place]: central pointing 2:00   Mucocutaneous Junction:  intact   Skin indentations, creases or scar tissue: none   [place]-stomal Skin Problems: scant scar tissue at distal lateral   Effluent / Flatus: liquid brown   Photo  _x___ Yes ____ No          sitting  Pouching Procedure:   Old appliance removed with medical adhesive spray remover.   Peristomal skin cleansed with: warm water washcloth   Peristomal skin treated with: none   Ostomy appliance used: cymed washer 2 mm, convex adapt 40mm paste ring ([id number]), ceraplus convex 70 mm 2 piece CTF with matching pouch      Patient Education: Change urostomies and ileostomies every 3-5 days. Change colostomies every 5-7 days. Change appliance immediately if it is leaking or peristomal skin feels irritated, has itching, or  burning.   To change the appliance, remove previous appliance, cleanse peristomal skin with warm water/washcloth, pat dry, make an ostomy template or use cardboard measuring guide and trace ostomy shape onto back of barrier, cut out barrier, apply a paste rings around barrier opening and apply appliance. Empty pouches when no more than ½ full. Check contents every 2 hours or as needed. Do not leave soap residue on tissue and do not use baby wipes or skin prep wipes. If skin irritation crust with stoma powder and then seal with no sting skin prep.    Should you experience any significant changes in your condition, such as infection (redness, swelling, localized heat, increased pain, fever > 101 F, chills) or have any questions regarding your home care instructions, please contact the wound center at [phone]. If after hours, contact your primary care physician or go to the hospital emergency room.     Verbal/demonstration instructions of above pouching " procedure provided to patient/family.      Response: verbalizes understanding to all instruction & can change & troubleshoot herself. Can trial pink zinc tape if adhesive is needed, medical adhesive spray remover.      PLAN: return if needed in 2 weeks if skin breakdown    Supplies Needed:   Appliance type:    Hiro as listed above             Other:      Accessories:         Supplier: [employer]    Frequency:  PRN      Professional Collaboration:  Evaluation notes forwarded to referring provider.      At the time of each visit, a thorough assessment of the patient is completed to assure appropriateness of our plan of care.  The plan of care may need to be adapted from the original plan of care to address any significant changes in patient status.    Clinician Signature:  _________________________________  Date:  ____________    Physician Signature:  ________________________________  Date:  ____________

## 2019-01-24 NOTE — PATIENT INSTRUCTIONS
Change urostomies and ileostomies every 3-5 days. Change colostomies every 5-7 days. Change appliance immediately if it is leaking or peristomal skin feels irritated, has itching, or  burning.   To change the appliance, remove previous appliance, cleanse peristomal skin with warm water/washcloth, pat dry, make an ostomy template or use cardboard measuring guide and trace ostomy shape onto back of barrier, cut out barrier, apply a paste ring around barrier opening and apply appliance. Empty pouches when no more than ½ full. Check contents every 2 hours or as needed. Do not leave soap residue on tissue and do not use baby wipes or skin prep wipes.     Should you experience any significant changes in your condition, such as infection (redness, swelling, localized heat, increased pain, fever > 101 F, chills) or have any questions regarding your home care instructions, please contact the wound center at [phone]. If after hours, contact your primary care physician or go to the hospital emergency room.

## 2019-01-31 RX ORDER — ONDANSETRON 2 MG/ML
4 INJECTION INTRAMUSCULAR; INTRAVENOUS
Status: CANCELLED | OUTPATIENT
Start: 2019-02-05

## 2019-01-31 RX ORDER — DEXAMETHASONE SODIUM PHOSPHATE 4 MG/ML
8 INJECTION, SOLUTION INTRA-ARTICULAR; INTRALESIONAL; INTRAMUSCULAR; INTRAVENOUS; SOFT TISSUE ONCE
Status: CANCELLED | OUTPATIENT
Start: 2019-02-05 | End: 2019-02-05

## 2019-01-31 RX ORDER — LIDOCAINE HYDROCHLORIDE 10 MG/ML
10 INJECTION, SOLUTION INFILTRATION; PERINEURAL ONCE
Status: CANCELLED | OUTPATIENT
Start: 2019-02-05 | End: 2019-02-05

## 2019-01-31 RX ORDER — DEXTROSE MONOHYDRATE 50 MG/ML
INJECTION, SOLUTION INTRAVENOUS CONTINUOUS
Status: CANCELLED | OUTPATIENT
Start: 2019-02-05

## 2019-01-31 RX ORDER — ONDANSETRON 8 MG/1
8 TABLET, ORALLY DISINTEGRATING ORAL
Status: CANCELLED | OUTPATIENT
Start: 2019-02-05

## 2019-01-31 RX ORDER — PROCHLORPERAZINE MALEATE 10 MG
10 TABLET ORAL EVERY 6 HOURS PRN
Status: CANCELLED | OUTPATIENT
Start: 2019-02-05

## 2019-02-04 NOTE — PROGRESS NOTES
"Pharmacy Chemotherapy Verification    Patient Name: Karlene Walters      Dx: Colon CA        Protocol: 5-FU + Leucovorin      *Dosing Reference*   IV over 2 hours on Day 1, followed by    IVP on Day 1, followed by         -- 10/31/17: delete Leucovorin and 5-FU push d/t neutropenia per Dr. Hardy   Fluorouracil   IV continuous infusion daily on Days 1 and 2 ( IV over 46-48 hours)        -- 20% reduction (1920 mg/m²) to be continued starting C28 per C Alsop APRN   14 day cycles for 12 cycles (adjuvant) or until disease progression or unacceptable toxicity  NCCN Guidelines for Colon Cancer. V.2.2015.  Jay T, et al. Eur J Cancer.1999;35(9):1343-7.      Allergies:  Codeine; Oxaliplatin; Pcn; Sulfa drugs; and Tape       /67   Pulse (!) 115   Temp 36.3 °C (97.3 °F) (Temporal)   Resp 18   Ht 1.651 m (5' 5\")   Wt 76.2 kg (167 lb 15.9 oz)   LMP  (LMP Unknown)   SpO2 96%   BMI 27.96 kg/m²  Body surface area is 1.87 meters squared.    Labs 02/05/19:  ANC ~4700 Plt = 295 k  Hgb = 13.2 SCr = 1.14 mg/dL  CrCl ~ 49 ml/min   LFTs = WNL except alk phos 187 Tbili = 0.8     Drug Order   (Drug name, dose, route, IV Fluid & volume, frequency, number of doses) Cycle 76 (C66 in [place])   Previous treatment: C75 = 01/22/19   Medication = Fluorouracil (5-FU)   Base Dose = 1920 mg/m²  Calc Dose:Base Dose x 1.87m² = 3590mg  Final Dose = 3590mg   Route = CIVI  Drug Conc = 50 mg/mL  Fluid & Volume = 71.8mL (+ 3 mL overfill)  Admin Duration = CIVI over 46 hrs @ 1.6 mL/hr   Via CADD pump for home infusion        <5% difference, OK to treat with final dose     By my signature below, I confirm this process was performed independently with the BSA and all final chemotherapy dosing calculations congruent. I have reviewed the above chemotherapy order and that my calculation of the final dose and BSA (when applicable) corroborate those calculations of the  pharmacist.       Lalit Andrade, PharmD, BCOP          "

## 2019-02-04 NOTE — PROGRESS NOTES
"Pharmacy Chemotherapy Verification      Dx: Colon CA        Cycle 76 ([place] cycle 68)  Previous treatment: 1/22/2019    Protocol: 5-FU+Leucovorin      *Dosing Reference*   IV over 2 hours on Day 1, followed by   IVP on Day 1, followed by     - Dose reduced by 20% to  starting with Cycle 18 ([place] #11) on 7/5/16 due to neutropenia     - 10/31/17: delete Leucovorin and 5-FU push d/t neutropenia per Dr. Hardy   Fluorouracil   IV continuous infusion daily on Days 1 and 2 ( IV over 46-48 hours) -     - Dose reduced by 20% starting with Cycle 18 ([place] #11) on 7/5/16 due to neutropenia    - 20% reduction (1920 mg/m2) to be continued starting C28 per C Alsop APRN   14 day cycles for 12 cycles (adjuvant) or until disease progression or unacceptable toxicity  NCCN Guidelines for Colon Cancer. V.2.2015.  Jay GROVER, et al. Eur J Cancer.1999;35(9):1343-7.      Allergies:  Codeine; Oxaliplatin; Pcn; Sulfa drugs; and Tape     /67   Pulse (!) 115   Temp 36.3 °C (97.3 °F) (Temporal)   Resp 18   Ht 1.651 m (5' 5\")   Wt 76.2 kg (167 lb 15.9 oz)   LMP  (LMP Unknown)   SpO2 96%   BMI 27.96 kg/m²  Body surface area is 1.87 meters squared.     LABS 2/5/2019:  ANC: 4670      WBC: 6.6     Plt: 295k   Hgb/Hct: 13.2/40.7     SCr: 1.14 mg/dL CrCl: 48.2 mL/min    K: 3.8  Na: 136          Glu: 100  LFT's: 27/23/187 TBili = 0.8      Fluorouracil (5-FU) 1920 mg/m² x Body surface area is 1.87 meters squared. m² = 3590.4mg              <5% difference, OK to treat with final dose = 3590 mg CIVI over 46 hours Via home infusion pump at 1.6 mL/hr    ANA Villatoro, PharmD  "

## 2019-02-05 ENCOUNTER — OUTPATIENT INFUSION SERVICES (OUTPATIENT)
Dept: ONCOLOGY | Facility: MEDICAL CENTER | Age: 79
End: 2019-02-05
Attending: INTERNAL MEDICINE
Payer: MEDICARE

## 2019-02-05 ENCOUNTER — OFFICE VISIT (OUTPATIENT)
Dept: HEMATOLOGY ONCOLOGY | Facility: MEDICAL CENTER | Age: 79
End: 2019-02-05
Payer: MEDICARE

## 2019-02-05 VITALS
TEMPERATURE: 98.7 F | HEART RATE: 108 BPM | RESPIRATION RATE: 20 BRPM | OXYGEN SATURATION: 97 % | HEIGHT: 65 IN | SYSTOLIC BLOOD PRESSURE: 124 MMHG | DIASTOLIC BLOOD PRESSURE: 78 MMHG | WEIGHT: 167.88 LBS | BODY MASS INDEX: 27.97 KG/M2

## 2019-02-05 VITALS
OXYGEN SATURATION: 96 % | TEMPERATURE: 97.3 F | SYSTOLIC BLOOD PRESSURE: 114 MMHG | RESPIRATION RATE: 18 BRPM | WEIGHT: 167.99 LBS | HEIGHT: 65 IN | DIASTOLIC BLOOD PRESSURE: 67 MMHG | BODY MASS INDEX: 27.99 KG/M2 | HEART RATE: 115 BPM

## 2019-02-05 VITALS
BODY MASS INDEX: 27.99 KG/M2 | TEMPERATURE: 97.3 F | RESPIRATION RATE: 18 BRPM | OXYGEN SATURATION: 96 % | DIASTOLIC BLOOD PRESSURE: 67 MMHG | HEIGHT: 65 IN | WEIGHT: 167.99 LBS | HEART RATE: 115 BPM | SYSTOLIC BLOOD PRESSURE: 114 MMHG

## 2019-02-05 DIAGNOSIS — R53.0 NEOPLASTIC MALIGNANT RELATED FATIGUE: ICD-10-CM

## 2019-02-05 DIAGNOSIS — C78.7 SECONDARY MALIGNANT NEOPLASM OF LIVER (HCC): Chronic | ICD-10-CM

## 2019-02-05 DIAGNOSIS — C20 RECTAL CANCER (HCC): ICD-10-CM

## 2019-02-05 DIAGNOSIS — R94.4 DECREASED GFR: ICD-10-CM

## 2019-02-05 DIAGNOSIS — C20 RECTAL CANCER (HCC): Chronic | ICD-10-CM

## 2019-02-05 LAB
ALBUMIN SERPL BCP-MCNC: 3.6 G/DL (ref 3.2–4.9)
ALBUMIN/GLOB SERPL: 1.1 G/DL
ALP SERPL-CCNC: 187 U/L (ref 30–99)
ALT SERPL-CCNC: 23 U/L (ref 2–50)
ANION GAP SERPL CALC-SCNC: 12 MMOL/L (ref 0–11.9)
AST SERPL-CCNC: 27 U/L (ref 12–45)
BASOPHILS # BLD AUTO: 0.8 % (ref 0–1.8)
BASOPHILS # BLD: 0.05 K/UL (ref 0–0.12)
BILIRUB SERPL-MCNC: 0.8 MG/DL (ref 0.1–1.5)
BUN SERPL-MCNC: 19 MG/DL (ref 8–22)
CALCIUM SERPL-MCNC: 9.5 MG/DL (ref 8.5–10.5)
CEA SERPL-MCNC: 1.5 NG/ML (ref 0–3)
CHLORIDE SERPL-SCNC: 104 MMOL/L (ref 96–112)
CO2 SERPL-SCNC: 20 MMOL/L (ref 20–33)
CREAT SERPL-MCNC: 1.14 MG/DL (ref 0.5–1.4)
EOSINOPHIL # BLD AUTO: 0.12 K/UL (ref 0–0.51)
EOSINOPHIL NFR BLD: 1.8 % (ref 0–6.9)
ERYTHROCYTE [DISTWIDTH] IN BLOOD BY AUTOMATED COUNT: 62.4 FL (ref 35.9–50)
GLOBULIN SER CALC-MCNC: 3.2 G/DL (ref 1.9–3.5)
GLUCOSE SERPL-MCNC: 100 MG/DL (ref 65–99)
HCT VFR BLD AUTO: 40.7 % (ref 37–47)
HGB BLD-MCNC: 13.2 G/DL (ref 12–16)
IMM GRANULOCYTES # BLD AUTO: 0.04 K/UL (ref 0–0.11)
IMM GRANULOCYTES NFR BLD AUTO: 0.6 % (ref 0–0.9)
LYMPHOCYTES # BLD AUTO: 0.91 K/UL (ref 1–4.8)
LYMPHOCYTES NFR BLD: 13.8 % (ref 22–41)
MCH RBC QN AUTO: 28.3 PG (ref 27–33)
MCHC RBC AUTO-ENTMCNC: 32.4 G/DL (ref 33.6–35)
MCV RBC AUTO: 87.2 FL (ref 81.4–97.8)
MONOCYTES # BLD AUTO: 0.79 K/UL (ref 0–0.85)
MONOCYTES NFR BLD AUTO: 12 % (ref 0–13.4)
NEUTROPHILS # BLD AUTO: 4.67 K/UL (ref 2–7.15)
NEUTROPHILS NFR BLD: 71 % (ref 44–72)
NRBC # BLD AUTO: 0 K/UL
NRBC BLD-RTO: 0 /100 WBC
PLATELET # BLD AUTO: 295 K/UL (ref 164–446)
PMV BLD AUTO: 9.6 FL (ref 9–12.9)
POTASSIUM SERPL-SCNC: 3.8 MMOL/L (ref 3.6–5.5)
PROT SERPL-MCNC: 6.8 G/DL (ref 6–8.2)
RBC # BLD AUTO: 4.67 M/UL (ref 4.2–5.4)
SODIUM SERPL-SCNC: 136 MMOL/L (ref 135–145)
WBC # BLD AUTO: 6.6 K/UL (ref 4.8–10.8)

## 2019-02-05 PROCEDURE — 700111 HCHG RX REV CODE 636 W/ 250 OVERRIDE (IP): Performed by: NURSE PRACTITIONER

## 2019-02-05 PROCEDURE — 306780 HCHG STAT FOR TRANSFUSION PER CASE

## 2019-02-05 PROCEDURE — 80053 COMPREHEN METABOLIC PANEL: CPT

## 2019-02-05 PROCEDURE — 99214 OFFICE O/P EST MOD 30 MIN: CPT | Performed by: NURSE PRACTITIONER

## 2019-02-05 PROCEDURE — 82378 CARCINOEMBRYONIC ANTIGEN: CPT

## 2019-02-05 PROCEDURE — A4212 NON CORING NEEDLE OR STYLET: HCPCS

## 2019-02-05 PROCEDURE — 36591 DRAW BLOOD OFF VENOUS DEVICE: CPT

## 2019-02-05 PROCEDURE — 85025 COMPLETE CBC W/AUTO DIFF WBC: CPT

## 2019-02-05 PROCEDURE — 96374 THER/PROPH/DIAG INJ IV PUSH: CPT | Mod: XU

## 2019-02-05 PROCEDURE — G0498 CHEMO EXTEND IV INFUS W/PUMP: HCPCS

## 2019-02-05 RX ORDER — DEXAMETHASONE SODIUM PHOSPHATE 4 MG/ML
8 INJECTION, SOLUTION INTRA-ARTICULAR; INTRALESIONAL; INTRAMUSCULAR; INTRAVENOUS; SOFT TISSUE ONCE
Status: COMPLETED | OUTPATIENT
Start: 2019-02-05 | End: 2019-02-05

## 2019-02-05 RX ADMIN — FLUOROURACIL 3590 MG: 50 INJECTION, SOLUTION INTRAVENOUS at 09:13

## 2019-02-05 RX ADMIN — DEXAMETHASONE SODIUM PHOSPHATE 8 MG: 4 INJECTION, SOLUTION INTRAMUSCULAR; INTRAVENOUS at 08:50

## 2019-02-05 ASSESSMENT — ENCOUNTER SYMPTOMS
MYALGIAS: 0
WHEEZING: 1
COUGH: 1
NAUSEA: 0
DIZZINESS: 0
VOMITING: 0
DIARRHEA: 0
WEIGHT LOSS: 1
FEVER: 0
SPUTUM PRODUCTION: 0
HEMOPTYSIS: 0
INSOMNIA: 1
PALPITATIONS: 0
CONSTIPATION: 0
SHORTNESS OF BREATH: 1
CHILLS: 0
HEADACHES: 0

## 2019-02-05 ASSESSMENT — PAIN SCALES - GENERAL: PAINLEVEL: NO PAIN

## 2019-02-05 NOTE — PROGRESS NOTES
Chemotherapy Verification - SECONDARY RN     D1C68    Height = 165.1 cm  Weight = 76.2 kg  BSA = 1.87 m2       Medication: Flourouracil (5FU) CADD pump  Dose: 1920 mg/m2  Calculated Dose: 3590.4 mg over 46 hours                               I confirm that this process was performed independently.

## 2019-02-05 NOTE — PROGRESS NOTES
Patient presents for [facility] home infusion pump connect. Reviewed plan of care, patient voices no new concerns. Port accessed this am, flushes well with brisk blood return. Patient connected to home infusion pump. Patient returns in two days and released in no acute distress with her .

## 2019-02-05 NOTE — PROGRESS NOTES
Chemotherapy Verification - PRIMARY RN      Height = 165.1 cm  Weight = 76.2 kg  BSA = 1.87 m2       Medication: Adrucil  Dose: 1,920 mg/m2  Calculated Dose: 3,590 mg                             (In mg/m2, AUC, mg/kg)           I confirm this process was performed independently with the BSA and all final chemotherapy dosing calculations congruent.  Any discrepancies of 5% or greater have been addressed with the chemotherapy pharmacist. The resolution of the discrepancy has been documented in the EPIC progress notes.

## 2019-02-05 NOTE — PROGRESS NOTES
"Subjective:      Karlene Walters is a 78 y.o. female who presents with Follow-Up (prechemo (Dr. Hardy)) for metastatic rectal carcinoma.         HPI    Patient seen today in follow up for metastatic rectal carcinoma.  Patient is unaccompanied for today's visit.  Patient is scheduled to receive cycle #68 of single agent 5FU today.    Patient is doing fairly well.  She does have persistent fatigue but it is tolerable.  She has been attempting to lose some weight but also stating food is not tasting very good at this time and her appetite is been fair.  She has lost approximately 6 pounds.  She denies any fevers or chills.  She does have a dry cough which waxes and wanes.  She uses inhalers as needed with positive results.  She has had significant cardiology and pulmonary workup for her shortness of breath.  It does appear to be worsening for the last month or so with some wheezing noted after taking a big deep breath.  All workup has been seem to be negative.  Questioning if her shortness of breath is more related to the significant cold weather.  She is due to follow-up with cardiology next month as well as pulmonary in July.  She is getting out and walking more when she can and trying to be a little bit more active.  She is active around the home.  She does have insomnia at times and is experiencing significant amount of stress lately.  Otherwise patient denies any nausea, vomiting, diarrhea or constipation.  She has normal output via her ostomy.  She denies any significant pain or abdominal pain.    Allergies   Allergen Reactions   • Oxaliplatin Anaphylaxis   • Codeine      \"gets drunk\"   • Pcn [Penicillins] Itching   • Sulfa Drugs Itching   • Tape Rash     PAPER TAPE OK     Current Outpatient Prescriptions on File Prior to Visit   Medication Sig Dispense Refill   • rivaroxaban (XARELTO) 10 MG Tab tablet Take 10 mg by mouth.     • Tiotropium Bromide-Olodaterol (STIOLTO RESPIMAT) 2.5-2.5 MCG/ACT Aero Soln Take 2 " Puffs by mouth every day. 1 Inhaler 11   • furosemide (LASIX) 40 MG Tab Take 1 Tab by mouth every day. 90 Tab 3   • raNITidine (ZANTAC) 150 MG Tab TAKE 1 TABLET 2 TIMES A DAY 60 Tab 3   • NON SPECIFIED by Intravenous route every 14 days. Chemo 5 FU     • metronidazole (METROCREAM) 0.75 % cream Apply 1 Each to affected area(s) 2 times a day.     • loperamide (IMODIUM) 2 MG Cap Take 2 mg by mouth 4 times a day as needed for Diarrhea.     • cyanocobalamin (VITAMIN B-12) 500 MCG Tab Take 500 mcg by mouth every day.     • Multiple Vitamins-Minerals (CENTRUM SILVER PO) Take 1 Tab by mouth every day.     • Cholecalciferol (VITAMIN D3) 400 UNITS CAPS Take 1 Cap by mouth every day.     • loratadine (CLARITIN) 10 MG TABS Take 10 mg by mouth every day. Indications: Hayfever     • ondansetron (ZOFRAN) 4 MG Tab tablet Take 1 Tab by mouth as needed. 30 Tab 3   • albuterol (PROAIR HFA) 108 (90 Base) MCG/ACT Aero Soln inhalation aerosol Inhale 2 Puffs by mouth every four hours as needed for Shortness of Breath (wheezing). 1 Inhaler 11   • isosorbide mononitrate SR (IMDUR) 30 MG TABLET SR 24 HR Take 1 Tab by mouth every morning. (Patient not taking: Reported on 2/5/2019) 30 Tab 3   • potassium chloride ER (KLOR-CON) 10 MEQ tablet TAKE ONE TABLET EVERY DAY AS NEEDED 30 Tab 6   • lidocaine-prilocaine (EMLA) 2.5-2.5 % Cream APPLY A THICK FILM OVER THE PORT ACCESS SITE PRIOR TO ACCESS 30 g 2     No current facility-administered medications on file prior to visit.          Review of Systems   Constitutional: Positive for malaise/fatigue and weight loss (6 pounds weight loss - food does not taste good). Negative for chills and fever.   Respiratory: Positive for cough (dry cough - waxes and wanes - uses inhalers as needed with positive results), shortness of breath (still present and worsening for the last month or so) and wheezing (at the end of a big deep breath). Negative for hemoptysis and sputum production.         Patient with  "persistent SOB - was told likely r/t chemo as all testing with cardiology and pulmonary have been negative   Cardiovascular: Negative for chest pain and palpitations.   Gastrointestinal: Negative for constipation, diarrhea, nausea and vomiting.   Genitourinary: Negative for dysuria.   Musculoskeletal: Negative for myalgias.   Skin: Negative for itching and rash.   Neurological: Negative for dizziness and headaches.   Psychiatric/Behavioral: The patient has insomnia (at times).         A lot of stress on patient           Objective:     /78 (BP Location: Right arm, Patient Position: Sitting, BP Cuff Size: Adult)   Pulse (!) 108   Temp 37.1 °C (98.7 °F) (Temporal)   Resp 20   Ht 1.651 m (5' 5\")   Wt 76.1 kg (167 lb 14.1 oz)   LMP  (LMP Unknown)   SpO2 97%   Breastfeeding? Unknown   BMI 27.94 kg/m²      Physical Exam   Constitutional: She is oriented to person, place, and time. She appears well-developed and well-nourished. No distress.   HENT:   Head: Normocephalic and atraumatic.   Mouth/Throat: Oropharynx is clear and moist. No oropharyngeal exudate.   Cardiovascular: Normal rate, regular rhythm and normal heart sounds.  Exam reveals no gallop and no friction rub.    No murmur heard.  Pulmonary/Chest: Effort normal and breath sounds normal. No respiratory distress. She has no wheezes.   Abdominal: Soft. Bowel sounds are normal. She exhibits no distension.   Musculoskeletal: Normal range of motion. She exhibits no edema or tenderness.   Neurological: She is alert and oriented to person, place, and time.   Skin: Skin is warm and dry. No rash noted. She is not diaphoretic. No erythema. No pallor.   Psychiatric: She has a normal mood and affect. Her behavior is normal.   Vitals reviewed.    [facility] on 02/05/2019   Component Date Value Ref Range Status   • WBC 02/05/2019 6.6  4.8 - 10.8 K/uL Final   • RBC 02/05/2019 4.67  4.20 - 5.40 M/uL Final   • Hemoglobin 02/05/2019 13.2  12.0 - " 16.0 g/dL Final   • Hematocrit 02/05/2019 40.7  37.0 - 47.0 % Final   • MCV 02/05/2019 87.2  81.4 - 97.8 fL Final   • MCH 02/05/2019 28.3  27.0 - 33.0 pg Final   • MCHC 02/05/2019 32.4* 33.6 - 35.0 g/dL Final   • RDW 02/05/2019 62.4* 35.9 - 50.0 fL Final   • Platelet Count 02/05/2019 295  164 - 446 K/uL Final   • MPV 02/05/2019 9.6  9.0 - 12.9 fL Final   • Neutrophils-Polys 02/05/2019 71.00  44.00 - 72.00 % Final   • Lymphocytes 02/05/2019 13.80* 22.00 - 41.00 % Final   • Monocytes 02/05/2019 12.00  0.00 - 13.40 % Final   • Eosinophils 02/05/2019 1.80  0.00 - 6.90 % Final   • Basophils 02/05/2019 0.80  0.00 - 1.80 % Final   • Immature Granulocytes 02/05/2019 0.60  0.00 - 0.90 % Final   • Nucleated RBC 02/05/2019 0.00  /100 WBC Final   • Neutrophils (Absolute) 02/05/2019 4.67  2.00 - 7.15 K/uL Final    Includes immature neutrophils, if present.   • Lymphs (Absolute) 02/05/2019 0.91* 1.00 - 4.80 K/uL Final   • Monos (Absolute) 02/05/2019 0.79  0.00 - 0.85 K/uL Final   • Eos (Absolute) 02/05/2019 0.12  0.00 - 0.51 K/uL Final   • Baso (Absolute) 02/05/2019 0.05  0.00 - 0.12 K/uL Final   • Immature Granulocytes (abs) 02/05/2019 0.04  0.00 - 0.11 K/uL Final   • NRBC (Absolute) 02/05/2019 0.00  K/uL Final   • Sodium 02/05/2019 136  135 - 145 mmol/L Final   • Potassium 02/05/2019 3.8  3.6 - 5.5 mmol/L Final   • Chloride 02/05/2019 104  96 - 112 mmol/L Final   • Co2 02/05/2019 20  20 - 33 mmol/L Final   • Anion Gap 02/05/2019 12.0* 0.0 - 11.9 Final   • Glucose 02/05/2019 100* 65 - 99 mg/dL Final   • Bun 02/05/2019 19  8 - 22 mg/dL Final   • Creatinine 02/05/2019 1.14  0.50 - 1.40 mg/dL Final   • Calcium 02/05/2019 9.5  8.5 - 10.5 mg/dL Final   • AST(SGOT) 02/05/2019 27  12 - 45 U/L Final   • ALT(SGPT) 02/05/2019 23  2 - 50 U/L Final   • Alkaline Phosphatase 02/05/2019 187* 30 - 99 U/L Final   • Total Bilirubin 02/05/2019 0.8  0.1 - 1.5 mg/dL Final   • Albumin 02/05/2019 3.6  3.2 - 4.9 g/dL Final   • Total Protein 02/05/2019  6.8  6.0 - 8.2 g/dL Final   • Globulin 02/05/2019 3.2  1.9 - 3.5 g/dL Final   • A-G Ratio 02/05/2019 1.1  g/dL Final   • Carcinoembryonic Antigen 02/05/2019 1.5  0.0 - 3.0 ng/mL Final    Comment: Effective September 17, 2013 the quantitative determination  of Carcinoembryonic Antigen (CEA) will now be performed at  [facility].  The Access CEA paramagnetic  particle chemiluminescent immunoassay is used.  Results  obtained with different test methods or kits cannot be used interchangeably.  Measurement of CEA has been shown to be  clinically relevant in the management of patients with  colorectal, breast, lung, prostatic, pancreatic, and ovarian  carcinomas.  Smokers may have slightly elevated levels of CEA.     • GFR If  02/05/2019 56* >60 mL/min/1.73 m 2 Final   • GFR If Non  02/05/2019 46* >60 mL/min/1.73 m 2 Final            Assessment/Plan:     1. Rectal cancer (HCC)     2. Secondary malignant neoplasm of liver (HCC)     3. Neoplastic malignant related fatigue       Plan  1.  Patient scheduled to receive cycle #68 of single agent 5FU.  Most recently patient did note to have some development of progression which was limited to the liver.  She underwent microwave ablation with Dr. West.  Her CEA levels have normalized and recently down to 1.5.  She is interested in continuing on maintenance 5-FU.  Clinically patient is stable and I did review her CBC and CMP today and labs are appropriate to proceed with treatment as planned.    2.  Patient is due for a CT scan this coming March ordered by Dr. West following microwave ablation.    3.  She is continued on low-dose Eliquis for previous history of pulmonary embolus.    4.  Overall patient is stable.  She does have some shortness of breath which is had extensive workup in the past.  She is due to follow-up with cardiology and pulmonary and to proceed as recommended.  Patient to continue with treatment  every 2 weeks as planned and will follow-up in the clinic in 4 weeks or sooner if needed.

## 2019-02-05 NOTE — PROGRESS NOTES
Patient arrives to [facility] for port draw prior to MD appt.  L chest port accessed with sterile technique, flushed with NS and brisk blood return noted.  Labs drawn and sent to lab.  Patients port flushed and saline locked for treatment later this morning.  MD appt is at 0730 today.

## 2019-02-07 ENCOUNTER — OUTPATIENT INFUSION SERVICES (OUTPATIENT)
Dept: ONCOLOGY | Facility: MEDICAL CENTER | Age: 79
End: 2019-02-07
Attending: COLON & RECTAL SURGERY
Payer: MEDICARE

## 2019-02-07 ENCOUNTER — APPOINTMENT (OUTPATIENT)
Dept: WOUND CARE | Facility: MEDICAL CENTER | Age: 79
End: 2019-02-07
Attending: COLON & RECTAL SURGERY
Payer: MEDICARE

## 2019-02-07 VITALS
HEIGHT: 65 IN | OXYGEN SATURATION: 99 % | SYSTOLIC BLOOD PRESSURE: 120 MMHG | TEMPERATURE: 97 F | DIASTOLIC BLOOD PRESSURE: 76 MMHG | WEIGHT: 168.87 LBS | RESPIRATION RATE: 18 BRPM | BODY MASS INDEX: 28.14 KG/M2 | HEART RATE: 100 BPM

## 2019-02-07 DIAGNOSIS — R94.4 DECREASED GFR: ICD-10-CM

## 2019-02-07 DIAGNOSIS — C20 RECTAL CANCER (HCC): ICD-10-CM

## 2019-02-07 PROCEDURE — 96523 IRRIG DRUG DELIVERY DEVICE: CPT

## 2019-02-07 PROCEDURE — 700111 HCHG RX REV CODE 636 W/ 250 OVERRIDE (IP)

## 2019-02-07 RX ADMIN — HEPARIN 500 UNITS: 100 SYRINGE at 07:20

## 2019-02-07 NOTE — PROGRESS NOTES
Pt arrived to [facility], ambulatory, for pump disconnect. Pt states she is not feeling well today, and has been nauseated. Pt taking her home anti-emetics appropriately. Pump shut off upon arrival, 0 mL left to be delivered. Pump disconnected and returned to pharmacy. Port flushed and heparin locked per policy, port de-accessed. Pt left [facility] with no s/sx of distress. Follow up appointment confirmed.

## 2019-02-14 RX ORDER — DEXAMETHASONE SODIUM PHOSPHATE 4 MG/ML
8 INJECTION, SOLUTION INTRA-ARTICULAR; INTRALESIONAL; INTRAMUSCULAR; INTRAVENOUS; SOFT TISSUE ONCE
Status: CANCELLED | OUTPATIENT
Start: 2019-02-19 | End: 2019-02-19

## 2019-02-14 RX ORDER — DEXTROSE MONOHYDRATE 50 MG/ML
INJECTION, SOLUTION INTRAVENOUS CONTINUOUS
Status: CANCELLED | OUTPATIENT
Start: 2019-02-19

## 2019-02-14 RX ORDER — ONDANSETRON 2 MG/ML
4 INJECTION INTRAMUSCULAR; INTRAVENOUS
Status: CANCELLED | OUTPATIENT
Start: 2019-02-19

## 2019-02-14 RX ORDER — PROCHLORPERAZINE MALEATE 10 MG
10 TABLET ORAL EVERY 6 HOURS PRN
Status: CANCELLED | OUTPATIENT
Start: 2019-02-19

## 2019-02-14 RX ORDER — LIDOCAINE HYDROCHLORIDE 10 MG/ML
10 INJECTION, SOLUTION INFILTRATION; PERINEURAL ONCE
Status: CANCELLED | OUTPATIENT
Start: 2019-02-19 | End: 2019-02-19

## 2019-02-14 RX ORDER — ONDANSETRON 8 MG/1
8 TABLET, ORALLY DISINTEGRATING ORAL
Status: CANCELLED | OUTPATIENT
Start: 2019-02-19

## 2019-02-19 ENCOUNTER — OUTPATIENT INFUSION SERVICES (OUTPATIENT)
Dept: ONCOLOGY | Facility: MEDICAL CENTER | Age: 79
End: 2019-02-19
Attending: INTERNAL MEDICINE
Payer: MEDICARE

## 2019-02-19 VITALS
RESPIRATION RATE: 18 BRPM | BODY MASS INDEX: 28.36 KG/M2 | OXYGEN SATURATION: 99 % | HEIGHT: 65 IN | SYSTOLIC BLOOD PRESSURE: 150 MMHG | WEIGHT: 170.19 LBS | HEART RATE: 119 BPM | TEMPERATURE: 97.1 F | DIASTOLIC BLOOD PRESSURE: 59 MMHG

## 2019-02-19 DIAGNOSIS — C20 RECTAL CANCER (HCC): Chronic | ICD-10-CM

## 2019-02-19 DIAGNOSIS — R94.4 DECREASED GFR: ICD-10-CM

## 2019-02-19 LAB
ANISOCYTOSIS BLD QL SMEAR: ABNORMAL
BASOPHILS # BLD AUTO: 0.6 % (ref 0–1.8)
BASOPHILS # BLD: 0.04 K/UL (ref 0–0.12)
COMMENT 1642: NORMAL
EOSINOPHIL # BLD AUTO: 0.2 K/UL (ref 0–0.51)
EOSINOPHIL NFR BLD: 2.9 % (ref 0–6.9)
ERYTHROCYTE [DISTWIDTH] IN BLOOD BY AUTOMATED COUNT: 65.6 FL (ref 35.9–50)
HCT VFR BLD AUTO: 38.4 % (ref 37–47)
HGB BLD-MCNC: 11.9 G/DL (ref 12–16)
IMM GRANULOCYTES # BLD AUTO: 0.03 K/UL (ref 0–0.11)
IMM GRANULOCYTES NFR BLD AUTO: 0.4 % (ref 0–0.9)
LYMPHOCYTES # BLD AUTO: 1.33 K/UL (ref 1–4.8)
LYMPHOCYTES NFR BLD: 19.5 % (ref 22–41)
MCH RBC QN AUTO: 27.8 PG (ref 27–33)
MCHC RBC AUTO-ENTMCNC: 31 G/DL (ref 33.6–35)
MCV RBC AUTO: 89.7 FL (ref 81.4–97.8)
MONOCYTES # BLD AUTO: 0.57 K/UL (ref 0–0.85)
MONOCYTES NFR BLD AUTO: 8.4 % (ref 0–13.4)
MORPHOLOGY BLD-IMP: NORMAL
NEUTROPHILS # BLD AUTO: 4.64 K/UL (ref 2–7.15)
NEUTROPHILS NFR BLD: 68.2 % (ref 44–72)
NRBC # BLD AUTO: 0 K/UL
NRBC BLD-RTO: 0 /100 WBC
OVALOCYTES BLD QL SMEAR: NORMAL
PLATELET # BLD AUTO: 242 K/UL (ref 164–446)
PLATELET BLD QL SMEAR: NORMAL
PMV BLD AUTO: 9.8 FL (ref 9–12.9)
POIKILOCYTOSIS BLD QL SMEAR: NORMAL
RBC # BLD AUTO: 4.28 M/UL (ref 4.2–5.4)
RBC BLD AUTO: PRESENT
WBC # BLD AUTO: 6.8 K/UL (ref 4.8–10.8)

## 2019-02-19 PROCEDURE — G0498 CHEMO EXTEND IV INFUS W/PUMP: HCPCS

## 2019-02-19 PROCEDURE — 85025 COMPLETE CBC W/AUTO DIFF WBC: CPT

## 2019-02-19 PROCEDURE — A4212 NON CORING NEEDLE OR STYLET: HCPCS

## 2019-02-19 PROCEDURE — 96374 THER/PROPH/DIAG INJ IV PUSH: CPT

## 2019-02-19 PROCEDURE — 96375 TX/PRO/DX INJ NEW DRUG ADDON: CPT

## 2019-02-19 PROCEDURE — 700111 HCHG RX REV CODE 636 W/ 250 OVERRIDE (IP): Performed by: NURSE PRACTITIONER

## 2019-02-19 RX ORDER — DEXAMETHASONE SODIUM PHOSPHATE 4 MG/ML
8 INJECTION, SOLUTION INTRA-ARTICULAR; INTRALESIONAL; INTRAMUSCULAR; INTRAVENOUS; SOFT TISSUE ONCE
Status: COMPLETED | OUTPATIENT
Start: 2019-02-19 | End: 2019-02-19

## 2019-02-19 RX ADMIN — FLUOROURACIL 3610 MG: 50 INJECTION, SOLUTION INTRAVENOUS at 11:57

## 2019-02-19 RX ADMIN — DEXAMETHASONE SODIUM PHOSPHATE 8 MG: 4 INJECTION, SOLUTION INTRA-ARTICULAR; INTRALESIONAL; INTRAMUSCULAR; INTRAVENOUS; SOFT TISSUE at 11:07

## 2019-02-19 NOTE — PROGRESS NOTES
Chemotherapy Verification - PRIMARY RN      Height = 1.645 m  Weight = 77.2 kg  BSA = 1.88 m2       Medication: fluorouracil  Dose: 1920 mg/m2  Calculated Dose: 3609.6 mg                             (In mg/m2, AUC, mg/kg)     I confirm this process was performed independently with the BSA and all final chemotherapy dosing calculations congruent.  Any discrepancies of 5% or greater have been addressed with the chemotherapy pharmacist. The resolution of the discrepancy has been documented in the EPIC progress notes.

## 2019-02-19 NOTE — PROGRESS NOTES
"Pharmacy Chemotherapy Verification    Patient Name: Karlene Walters      Dx: Colon CA        Protocol: 5-FU + Leucovorin      *Dosing Reference*   IV over 2 hours on Day 1, followed by    IVP on Day 1, followed by         -- 10/31/17: delete Leucovorin and 5-FU push d/t neutropenia per Dr. Hardy   Fluorouracil   IV continuous infusion daily on Days 1 and 2 ( IV over 46-48 hours)        -- 20% reduction (1920 mg/m²) to be continued starting C28 per C Alsop APRN   14 day cycles for 12 cycles (adjuvant) or until disease progression or unacceptable toxicity  NCCN Guidelines for Colon Cancer. V.2.2015.  Jay T, et al. Eur J Cancer.1999;35(9):1343-7.      Allergies:  Codeine; Oxaliplatin; Pcn; Sulfa drugs; and Tape       /59   Pulse (!) 119   Temp 36.2 °C (97.1 °F) (Temporal)   Resp 18   Ht 1.645 m (5' 4.75\")   Wt 77.2 kg (170 lb 3.1 oz)   LMP  (LMP Unknown)   SpO2 99%   BMI 28.54 kg/m²  Body surface area is 1.88 meters squared.    LABS 2/19/2019:    All labs within treatment parameters 2/19/2019 (CBC) and 2/5/2019 (CMP)    Drug Order   (Drug name, dose, route, IV Fluid & volume, frequency, number of doses) Cycle 77 (C67 in [facility])   Previous treatment: C76 = 2/5/2019   Medication = Fluorouracil (5-FU)   Base Dose = 1920 mg/m²  Calc Dose:Base Dose x Body surface area is 1.88 meters squared.m² = 3609.6 mg  Final Dose = 3610 mg   Route = CIVI  Drug Conc = 50 mg/mL  Fluid & Volume = 72.2 mL (+ 3 mL overfill)  Admin Duration = CIVI over 46 hrs @ 1.6 mL/hr   Via CADD pump for home infusion        <5% difference, OK to treat with final dose     By my signature below, I confirm this process was performed independently with the BSA and all final chemotherapy dosing calculations congruent. I have reviewed the above chemotherapy order and that my calculation of the final dose and BSA (when applicable) corroborate those calculations of the  pharmacist.       ANA Villatoro, PharmD            "

## 2019-02-19 NOTE — PROGRESS NOTES
Pt ambulatory w/  to \[facility]\"" for C69 D1 of 5FU pump for colorectal cancer.  Pt w/ no s/s of infection, pt has no complaints at this time.  Pt PORT w/ EMLA cream in place.  EMLA cream cleaned off of PORT site, PORT accessed using sterile technique w/ low profile needle per pt request, brisk blood return noted, labs drawn per orders, flushed per protocol.  Lab results w/n parameters for tx today.  Pre-med given w/ no adverse reactions.  CADD pump connected to PORT, all clamps opened, confirmed to be in RUN mode, placed in pt's konrad pack.  Pt left on foot in NAD.  Confirmed pt's de-access appt.

## 2019-02-19 NOTE — PROGRESS NOTES
"Pharmacy Chemotherapy Verification      Dx: Colon CA        Cycle 77 (Siren cycle 69)  Previous treatment: 2/5/19    Protocol: 5-FU+Leucovorin      *Dosing Reference*   IV over 2 hours on Day 1, followed by   IVP on Day 1, followed by     - Dose reduced by 20% to  starting with Cycle 18 (Siren #11) on 7/5/16 due to neutropenia     - 10/31/17: delete Leucovorin and 5-FU push d/t neutropenia per Dr. Hardy   Fluorouracil   IV continuous infusion daily on Days 1 and 2 ( IV over 46-48 hours) -     - Dose reduced by 20% starting with Cycle 18 (Siren #11) on 7/5/16 due to neutropenia    - 20% reduction (1920 mg/m2) to be continued starting C28 per C Alsop APRN   14 day cycles for 12 cycles (adjuvant) or until disease progression or unacceptable toxicity  NCCN Guidelines for Colon Cancer. V.2.2015.  Jay T, et al. Eur J Cancer.1999;35(9):1343-7.      Allergies:  Codeine; Oxaliplatin; Pcn; Sulfa drugs; and Tape     /59   Pulse (!) 119   Temp 36.2 °C (97.1 °F) (Temporal)   Resp 18   Ht 1.645 m (5' 4.75\")   Wt 77.2 kg (170 lb 3.1 oz)   LMP  (LMP Unknown)   SpO2 99%   BMI 28.54 kg/m²  Body surface area is 1.88 meters squared.     All lab results within treatment parameters.      Fluorouracil (5-FU) 1920 mg/m² x 1.88m² = 3609.6mg              <5% difference, OK to treat with final dose = 3610mg    CIVI over 46 hours Via home infusion pump at   1.6mL/hr    Nakia Arroyo, PharmD  "

## 2019-02-19 NOTE — PROGRESS NOTES
Chemotherapy Verification - SECONDARY RN       Height = 64.75 in  Weight = 77.2 kg  BSA = 1.88 m2       Medication: 5 FU pump  Dose: 1920 mg/m2 over 46 hours  Calculated Dose: 3609.6 mg over 46 hours                            (In mg/m2, AUC, mg/kg)     I confirm that this process was performed independently.

## 2019-02-20 ENCOUNTER — NON-PROVIDER VISIT (OUTPATIENT)
Dept: WOUND CARE | Facility: MEDICAL CENTER | Age: 79
End: 2019-02-20
Attending: COLON & RECTAL SURGERY
Payer: MEDICARE

## 2019-02-20 ENCOUNTER — OFFICE VISIT (OUTPATIENT)
Dept: MEDICAL GROUP | Facility: MEDICAL CENTER | Age: 79
End: 2019-02-20
Payer: MEDICARE

## 2019-02-20 VITALS
TEMPERATURE: 96.2 F | OXYGEN SATURATION: 100 % | BODY MASS INDEX: 29.58 KG/M2 | WEIGHT: 173.28 LBS | DIASTOLIC BLOOD PRESSURE: 60 MMHG | HEART RATE: 93 BPM | SYSTOLIC BLOOD PRESSURE: 118 MMHG | HEIGHT: 64 IN

## 2019-02-20 DIAGNOSIS — N28.9 RENAL INSUFFICIENCY: ICD-10-CM

## 2019-02-20 DIAGNOSIS — Z86.711 HISTORY OF PULMONARY EMBOLISM: ICD-10-CM

## 2019-02-20 DIAGNOSIS — R73.9 HYPERGLYCEMIA: ICD-10-CM

## 2019-02-20 DIAGNOSIS — R74.8 ELEVATED ALKALINE PHOSPHATASE LEVEL: ICD-10-CM

## 2019-02-20 DIAGNOSIS — E04.1 THYROID NODULE: ICD-10-CM

## 2019-02-20 DIAGNOSIS — C20 RECTAL CANCER (HCC): Chronic | ICD-10-CM

## 2019-02-20 DIAGNOSIS — R42 LIGHTHEADEDNESS: ICD-10-CM

## 2019-02-20 DIAGNOSIS — Z00.00 ROUTINE GENERAL MEDICAL EXAMINATION AT A HEALTH CARE FACILITY: ICD-10-CM

## 2019-02-20 DIAGNOSIS — R06.02 SHORTNESS OF BREATH: ICD-10-CM

## 2019-02-20 DIAGNOSIS — K59.1 FUNCTIONAL DIARRHEA: ICD-10-CM

## 2019-02-20 DIAGNOSIS — R94.4 DECREASED GFR: ICD-10-CM

## 2019-02-20 DIAGNOSIS — T45.1X5A CHEMOTHERAPY-INDUCED NEUTROPENIA (HCC): ICD-10-CM

## 2019-02-20 DIAGNOSIS — R11.2 NON-INTRACTABLE VOMITING WITH NAUSEA, UNSPECIFIED VOMITING TYPE: ICD-10-CM

## 2019-02-20 DIAGNOSIS — D64.9 ANEMIA, UNSPECIFIED TYPE: ICD-10-CM

## 2019-02-20 DIAGNOSIS — J43.9 PULMONARY EMPHYSEMA, UNSPECIFIED EMPHYSEMA TYPE (HCC): Chronic | ICD-10-CM

## 2019-02-20 DIAGNOSIS — E78.5 HYPERLIPIDEMIA, UNSPECIFIED HYPERLIPIDEMIA TYPE: ICD-10-CM

## 2019-02-20 DIAGNOSIS — I10 ESSENTIAL HYPERTENSION: ICD-10-CM

## 2019-02-20 DIAGNOSIS — C78.7 SECONDARY MALIGNANT NEOPLASM OF LIVER (HCC): Chronic | ICD-10-CM

## 2019-02-20 DIAGNOSIS — D70.1 CHEMOTHERAPY-INDUCED NEUTROPENIA (HCC): ICD-10-CM

## 2019-02-20 PROCEDURE — 99211 OFF/OP EST MAY X REQ PHY/QHP: CPT

## 2019-02-20 PROCEDURE — G0439 PPPS, SUBSEQ VISIT: HCPCS | Performed by: INTERNAL MEDICINE

## 2019-02-20 ASSESSMENT — ENCOUNTER SYMPTOMS: GENERAL WELL-BEING: GOOD

## 2019-02-20 ASSESSMENT — PATIENT HEALTH QUESTIONNAIRE - PHQ9: CLINICAL INTERPRETATION OF PHQ2 SCORE: 0

## 2019-02-20 ASSESSMENT — ACTIVITIES OF DAILY LIVING (ADL): BATHING_REQUIRES_ASSISTANCE: 0

## 2019-02-20 NOTE — PATIENT INSTRUCTIONS
Change ileostomies every 3-5 days. Change appliance immediately if it is leaking or peristomal skin feels irritated, has itching, or  burning.   To change the appliance, remove previous appliance, cleanse peristomal skin with warm water/washcloth, pat dry, make an ostomy template or use cardboard measuring guide and trace ostomy shape onto back of barrier, cut out barrier, apply a paste ring around barrier opening and apply appliance. Empty pouches when no more than ½ full. Check contents every 2 hours or as needed. Do not leave soap residue on tissue and do not use baby wipes or skin prep wipes.     Should you experience any significant changes in your condition, such as infection (redness, swelling, localized heat, increased pain, fever > 101 F, chills) or have any questions regarding your home care instructions, please contact the wound center at [phone]. If after hours, contact your primary care physician or go to the hospital emergency room.

## 2019-02-20 NOTE — PROGRESS NOTES
Chief Complaint   Patient presents with   • Annual Wellness Visit         HPI:  Karlene is a 78 y.o. here for Medicare Annual Wellness Visit.  She is accompanied by her .      Patient Active Problem List    Diagnosis Date Noted   • Shortness of breath 10/05/2015     Priority: High   • History of pulmonary embolism 08/18/2015     Priority: High   • Abnormal EKG 08/12/2013     Priority: High   • Nausea & vomiting 08/15/2015     Priority: Medium   • Hypertension 10/01/2012     Priority: Medium   • Pulmonary emphysema (HCC) 01/10/2018   • Elevated alkaline phosphatase level 11/15/2017   • Travel advice encounter 11/15/2017   • Localized edema 10/04/2017   • Uncontrolled pain 06/15/2017   • Chemotherapy-induced neutropenia (HCC) 09/28/2016   • Decreased GFR 08/16/2016   • Routine general medical examination at a health care facility 03/14/2016   • Renal insufficiency 03/14/2016   • Hyperglycemia 03/14/2016   • Hyperlipidemia 09/17/2015   • Lightheadedness 09/17/2015   • Ileitis 08/24/2015   • Diarrhea 08/24/2015   • Dehydration 08/14/2015   • Secondary malignant neoplasm of liver (HCC) 07/06/2015   • Thyroid nodule 12/19/2014   • Anemia 10/01/2012   • Rectal cancer (HCC) 09/26/2012       Current Outpatient Prescriptions   Medication Sig Dispense Refill   • rivaroxaban (XARELTO) 10 MG Tab tablet Take 10 mg by mouth.     • Tiotropium Bromide-Olodaterol (STIOLTO RESPIMAT) 2.5-2.5 MCG/ACT Aero Soln Take 2 Puffs by mouth every day. 1 Inhaler 11   • albuterol (PROAIR HFA) 108 (90 Base) MCG/ACT Aero Soln inhalation aerosol Inhale 2 Puffs by mouth every four hours as needed for Shortness of Breath (wheezing). 1 Inhaler 11   • furosemide (LASIX) 40 MG Tab Take 1 Tab by mouth every day. 90 Tab 3   • raNITidine (ZANTAC) 150 MG Tab TAKE 1 TABLET 2 TIMES A DAY 60 Tab 3   • NON SPECIFIED by Intravenous route every 14 days. Chemo 5 FU      • metronidazole (METROCREAM) 0.75 % cream Apply 1 Each to affected area(s) 2 times a day.      • loperamide (IMODIUM) 2 MG Cap Take 2 mg by mouth 4 times a day as needed for Diarrhea.     • cyanocobalamin (VITAMIN B-12) 500 MCG Tab Take 500 mcg by mouth every day.     • Multiple Vitamins-Minerals (CENTRUM SILVER PO) Take 1 Tab by mouth every day.     • Cholecalciferol (VITAMIN D3) 400 UNITS CAPS Take 1 Cap by mouth every day.     • loratadine (CLARITIN) 10 MG TABS Take 10 mg by mouth every day. Indications: Hayfever     • ondansetron (ZOFRAN) 4 MG Tab tablet Take 1 Tab by mouth as needed. 30 Tab 3   • isosorbide mononitrate SR (IMDUR) 30 MG TABLET SR 24 HR Take 1 Tab by mouth every morning. (Patient not taking: Reported on 2/5/2019) 30 Tab 3   • potassium chloride ER (KLOR-CON) 10 MEQ tablet TAKE ONE TABLET EVERY DAY AS NEEDED 30 Tab 6   • lidocaine-prilocaine (EMLA) 2.5-2.5 % Cream APPLY A THICK FILM OVER THE PORT ACCESS SITE PRIOR TO ACCESS 30 g 2     No current facility-administered medications for this visit.         Patient is taking medications as noted in medication list.  Current supplements as per medication list.     Allergies: Oxaliplatin; Codeine; Pcn [penicillins]; Sulfa drugs; and Tape    Current social contact/activities: PT reports living with  and 2 dogs, visit with family and friends, travel, Congregational     Is patient current with immunizations? No, due for TDAP and SHINGRIX (Shingles). Patient is interested in receiving NONE today.    She  reports that she has never smoked. She has never used smokeless tobacco. She reports that she does not drink alcohol or use drugs.  Counseling given: Not Answered        DPA/Advanced directive: Patient does not have an Advanced Directive.  A packet and workshop information was given on Advanced Directives.    ROS:    Gait: Uses no assistive device   Ostomy: Yes ileostomy    Other tubes: No   Amputations: No   Chronic oxygen use No   Last eye exam 08/2018   Wears hearing aids: No   : Denies any urinary leakage during the last 6 months     Depression  Screening    Little interest or pleasure in doing things?  0 - not at all  Feeling down, depressed, or hopeless? 0 - not at all  Patient Health Questionnaire Score: 0    If depressive symptoms identified deferred to follow up visit unless specifically addressed in assessment and plan.    Interpretation of PHQ-9 Total Score   Score Severity   1-4 No Depression   5-9 Mild Depression   10-14 Moderate Depression   15-19 Moderately Severe Depression   20-27 Severe Depression    Screening for Cognitive Impairment    Three Minute Recall (leader, season, table)  3/3    Kleber clock face with all 12 numbers and set the hands to show 10 past 11.  Yes 5/5  If cognitive concerns identified, deferred for follow up unless specifically addressed in assessment and plan.    Fall Risk Assessment    Has the patient had two or more falls in the last year or any fall with injury in the last year?  No  If fall risk identified, deferred for follow up unless specifically addressed in assessment and plan.    Safety Assessment    Throw rugs on floor.  Yes  Handrails on all stairs.  Yes  Good lighting in all hallways.  Yes  Difficulty hearing.  No  Patient counseled about all safety risks that were identified.    Functional Assessment ADLs    Are there any barriers preventing you from cooking for yourself or meeting nutritional needs?  No.    Are there any barriers preventing you from driving safely or obtaining transportation?  No.    Are there any barriers preventing you from using a telephone or calling for help?  No.    Are there any barriers preventing you from shopping?  No.    Are there any barriers preventing you from taking care of your own finances?  No.    Are there any barriers preventing you from managing your medications?  No.    Are there any barriers preventing you from showering, bathing or dressing yourself?  No.    Are you currently engaging in any exercise or physical activity?  Yes.  PT reports house hold choirs, walking,  shoveling snow.   What is your perception of your health?  Good.    Health Maintenance Summary                IMM DTaP/Tdap/Td Vaccine Overdue 4/13/1959     BONE DENSITY Overdue 4/13/2005     IMM ZOSTER VACCINES Overdue 3/11/2015      Done 1/14/2015 Imm Admin: Zoster Vaccine Live (ZVL) (Zostavax)    Annual Wellness Visit Overdue 5/13/2018      Not specified 5/12/2017      Patient has more history with this topic...    COLONOSCOPY Next Due 8/23/2025      Done 8/23/2015 Surg:COLONOSCOPY WITH BIOPSY     Patient has more history with this topic...          Patient Care Team:  Manan Quiñonez M.D. as PCP - General (Internal Medicine)  Kit West as Consulting Physician (Surgery)  ARTURO Krishnan as Consulting Physician (Oncology)  Kolton Montgomery M.D. as Consulting Physician (Surgery)  Ahsan Haque M.D. as Consulting Physician (Cardiology)  Justin Zaidi M.D. as Consulting Physician (Ophthalmology)  Eric Hardy M.D. as Consulting Physician (Oncology)    Social History   Substance Use Topics   • Smoking status: Never Smoker   • Smokeless tobacco: Never Used   • Alcohol use No      Comment: rare     Family History   Problem Relation Age of Onset   • Heart Disease Mother    • Heart Failure Mother    • Hypertension Mother    • Hyperlipidemia Mother    • Cancer Mother    • Cancer Maternal Aunt 60        breast ca   • Lung Disease Brother         COPD/Emphysema/Smoker   • Cancer Brother         prostate cancer   • Alcohol/Drug Brother         Alcohol   • Kidney Disease Father         renal failure     She  has a past medical history of Arrhythmia; Blood clotting disorder (HCC) (08/2015); Blood transfusion (1957); Breath shortness; Cancer (HCC) (09/2012); CATARACT; Chemotherapy-induced neutropenia (HCC) (9/28/2016); Chickenpox; Colon cancer (HCC); Dental disorder; Elevated alkaline phosphatase level (11/15/2017); Emphysema of lung (HCC); Fall; Uzbek measles; Heart murmur; Hemorrhagic  disorder (HCC); Hypercholesteremia; Hypertension; Ileostomy in place (HCC); Indigestion; Influenza; Lightheadedness (9/17/2015); Mumps; Obstruction; Other specified symptom associated with female genital organs; Pneumonia (05/2017); Pulmonary embolism (HCC); 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); Renal insufficiency (3/14/2016); Routine general medical examination at a health care facility (3/14/2016); Seasonal allergies; Swelling of both ankles; and Tonsillitis.   Past Surgical History:   Procedure Laterality Date   • HEPATIC ABLATION LAPAROSCOPIC  11/15/2018    Procedure: HEPATIC ABLATION LAPAROSCOPIC.- SEGMENT 7/8;  Surgeon: Kit West M.D.;  Location: SURGERY [facility];  Service: General   • COLONOSCOPY WITH BIOPSY  8/23/2015    Procedure: COLONOSCOPY WITH BIOPSY;  Surgeon: Wilbur Glasgow M.D.;  Location: ENDOSCOPY [facility];  Service:    • HEPATIC ABLATION LAPAROSCOPIC  7/6/2015    Procedure: HEPATIC ABLATION LAPAROSCOPIC.;  Surgeon: Kit West M.D.;  Location: SURGERY [facility];  Service:    • CATH PLACEMENT Left 7/6/2015    Procedure: CATH PLACEMENT CEPHALIC POWERPORT;  Surgeon: Kit West M.D.;  Location: SURGERY [facility];  Service:    • FISTULA IN ANO REPAIR  1/28/2015    Performed by Kolton Montgomery M.D. at [facility]   • PERINEAL PROCEDURE  1/28/2015    Performed by Kolton Montgomery M.D. at [facility]   • FISTULA IN ANO REPAIR  10/15/2014    Performed by Kolton Montgomery M.D. at [facility]   • PERINEAL PROCEDURE  10/15/2014    Performed by Kolton Montgomery M.D. at [facility]   • IRRIGATION & DEBRIDEMENT GENERAL  2/19/2014    Performed by Kolton Montgomery M.D. at [facility]   • FLAP GRAFT  2/19/2014    Performed by Kolton Montgomery M.D. at [facility]   • PERINEAL PROCEDURE  12/18/2013    Performed by Kolton Montgomery M.D. at [facility]   • MYOCUTANEOUS FLAP   "12/18/2013    Performed by Kolton Montgomery M.D. at SURGERY [facility]   • IRRIGATION & DEBRIDEMENT GENERAL  10/23/2013    Performed by Kolton Montgomery M.D. at SURGERY [facility]   • FISTULA IN ANO REPAIR  9/4/2013    Performed by Kolton Montgomery M.D. at [facility]   • FLAP ROTATION  9/4/2013    Performed by Kolton Montgomery M.D. at SURGERY [facility]   • RECOVERY  8/26/2013    Performed by Cath-Recovery Surgery at [facility] SAME DAY [facility]   • BIOPSY GENERAL  7/17/2013    Performed by Kolton Montgomery M.D. at [facility]   • PROCTOSCOPY  7/17/2013    Performed by Kolton Montgomery M.D. at [facility]   • FISTULA IN ANO REPAIR  2/27/2013    Performed by Kolton Montgomery M.D. at SURGERY [facility]   • SIGMOIDOSCOPY  2/27/2013    Performed by Kolton Montgomery M.D. at [facility]   • LAPAROSCOPY  10/8/2012    Performed by Kolton Montgomery M.D. at [facility]   • ILEO LOOP DIVERSION  10/8/2012    Performed by Kolton Montgomery M.D. at [facility]   • COLECTOMY  9/26/2012    Performed by Kolton Montgomery M.D. at [facility]   • COLONOSCOPY FLEX W/ENDO US  9/20/2012    Performed by Harshil Bolton M.D. at [facility]   • OTHER  2009    left eye torn retina repair   • CATARACT EXTRACTION WITH IOL  2004    bilateral   • ABDOMINAL HYSTERECTOMY TOTAL  1983   • CYST EXCISION  1972    ovarian   • COLON RESECTION     • EYE SURGERY      cataracts   • HYSTERECTOMY LAPAROSCOPY     • PB REMV 2ND CATARACT,CORN-SCLER SECTN     • TONSILLECTOMY             Exam:     Blood pressure 118/60, pulse 93, temperature (!) 35.7 °C (96.2 °F), temperature source Temporal, height 1.635 m (5' 4.37\"), weight 78.6 kg (173 lb 4.5 oz), SpO2 100 %, unknown if currently breastfeeding. Body mass index is 29.4 kg/m².    Hearing excellent.    Dentition upper and lower dentures  Alert, oriented in no acute distress.  Eye contact is good, speech goal directed, affect calm. "  Neck is supple and without significant lymphadenopathy.  Lungs are clear without obvious rales or wheeze.  Cardiovascular peripheral circulation is satisfactory.  Heart sounds are unremarkable.  Ileostomy was not examined.  Abdomen is soft and without obvious masses or tenderness.  There are normal bowel sounds.  Breast, pelvic, and rectal exams were not performed at this time.  Extremities are without significant edema, cyanosis, or deformity.  Brief neurologic exam was unremarkable with essentially normal sensation, strength, gait, and cerebellar function.      Assessment and Plan. The following treatment and monitoring plan is recommended:    Chemotherapy-induced neutropenia  No significant abnormalities currently.  Blood counts are being followed closely.    Decreased GFR  GFR is a little low at 86 and fairly stable.  She was encouraged to remain well-hydrated.    Diarrhea  She intermittently gets diarrhea from her ileostomy which she treats with Imodium.    Elevated alkaline phosphatase level  Alkaline phosphatase remains elevated and is 187.  Other liver function tests are normal.  This seems related to the lesion on the liver.    History of pulmonary embolism  History of pulmonary embolus for which she is anticoagulated with Xarelto.  She denies recent unusual bleeding or bruising.    Hyperglycemia  Blood sugar has intermittently been mildly elevated, most recently result is 100.    Hyperlipidemia  Most recent lipid panel shows cholesterol 192, triglycerides 292, HDL 39, LDL 95.  This was in 2015.She tries to follow appropriate diet.    Hypertension  Blood pressure currently is under good control primarily with low-salt diet.  She will continue low-salt diet and report any lightheadedness.    Nausea & vomiting  She has relatively rare nausea and vomiting that she treats successfully with Zofran.    Rectal cancer (HCC)  She has a history of rectal cancer treated with resection and ileostomy.  Apparent  metastasis to dome of liver treated with radiation.  CEA is 1.5.    Renal insufficiency  She has mild renal insufficiency with GFR 46.  She is encouraged to remain well-hydrated.    Secondary malignant neoplasm of liver (HCC)  Metastasis to liver being followed by serial alkaline phosphatase and treated with radiation.    Thyroid nodule  She has a history of a prior thyroid nodule that was treated with fine-needle aspiration and no significant abnormality found she reports.          Services suggested: No services needed at this time  Health Care Screening recommendations as per orders if indicated.  Referrals offered: PT/OT/Nutrition counseling/Behavioral Health/Smoking cessation as per orders if indicated.    Discussion today about general wellness and lifestyle habits:    · Prevent falls and reduce trip hazards; Cautioned about securing or removing rugs.  · Have a working fire alarm and carbon monoxide detector;   · Engage in regular physical activity and social activities       Follow-up: 6 months.

## 2019-02-21 ENCOUNTER — OUTPATIENT INFUSION SERVICES (OUTPATIENT)
Dept: ONCOLOGY | Facility: MEDICAL CENTER | Age: 79
End: 2019-02-21
Attending: INTERNAL MEDICINE
Payer: MEDICARE

## 2019-02-21 VITALS
TEMPERATURE: 97.5 F | RESPIRATION RATE: 18 BRPM | OXYGEN SATURATION: 99 % | HEART RATE: 106 BPM | HEIGHT: 65 IN | WEIGHT: 168.87 LBS | SYSTOLIC BLOOD PRESSURE: 139 MMHG | BODY MASS INDEX: 28.14 KG/M2 | DIASTOLIC BLOOD PRESSURE: 78 MMHG

## 2019-02-21 DIAGNOSIS — C20 RECTAL CANCER (HCC): ICD-10-CM

## 2019-02-21 DIAGNOSIS — R94.4 DECREASED GFR: ICD-10-CM

## 2019-02-21 PROCEDURE — 700111 HCHG RX REV CODE 636 W/ 250 OVERRIDE (IP)

## 2019-02-21 PROCEDURE — 96523 IRRIG DRUG DELIVERY DEVICE: CPT

## 2019-02-21 RX ADMIN — HEPARIN 500 UNITS: 100 SYRINGE at 13:24

## 2019-02-21 NOTE — PROGRESS NOTES
Patient arrived for CADD pump de-access. Confirmed 0ml remaining.  Pump disconnected. Port flushed and patent.  Heparin flushed, de-accessed.  Pt returns in two weeks; pump disinfected and returned to pharmacy.  Discharged home in good spirits under no apparent distress.

## 2019-02-21 NOTE — ASSESSMENT & PLAN NOTE
She has a history of a prior thyroid nodule that was treated with fine-needle aspiration and no significant abnormality found she reports.

## 2019-02-21 NOTE — ASSESSMENT & PLAN NOTE
History of pulmonary embolus for which she is anticoagulated with Xarelto.  She denies recent unusual bleeding or bruising.

## 2019-02-21 NOTE — ASSESSMENT & PLAN NOTE
Blood pressure currently is under good control primarily with low-salt diet.  She will continue low-salt diet and report any lightheadedness.

## 2019-02-21 NOTE — ASSESSMENT & PLAN NOTE
Most recent lipid panel shows cholesterol 192, triglycerides 292, HDL 39, LDL 95.  This was in 2015.She tries to follow appropriate diet.

## 2019-02-21 NOTE — ASSESSMENT & PLAN NOTE
Alkaline phosphatase remains elevated and is 187.  Other liver function tests are normal.  This seems related to the lesion on the liver.

## 2019-02-21 NOTE — ASSESSMENT & PLAN NOTE
She has a history of rectal cancer treated with resection and ileostomy.  Apparent metastasis to dome of liver treated with radiation.  CEA is 1.5.

## 2019-02-22 DIAGNOSIS — C78.7 SECONDARY MALIGNANT NEOPLASM OF LIVER (HCC): ICD-10-CM

## 2019-02-22 RX ORDER — LIDOCAINE AND PRILOCAINE 25; 25 MG/G; MG/G
CREAM TOPICAL
Qty: 30 G | Refills: 3 | Status: SHIPPED | OUTPATIENT
Start: 2019-02-22 | End: 2020-08-11

## 2019-03-01 ENCOUNTER — TELEPHONE (OUTPATIENT)
Dept: HEMATOLOGY ONCOLOGY | Facility: MEDICAL CENTER | Age: 79
End: 2019-03-01

## 2019-03-01 RX ORDER — 0.9 % SODIUM CHLORIDE 0.9 %
20 VIAL (ML) INJECTION PRN
Status: CANCELLED | OUTPATIENT
Start: 2020-05-14

## 2019-03-01 NOTE — TELEPHONE ENCOUNTER
The patient called to inform our office that her dog bit her. Nubia Godinez RN  Stated have the patient call our office if the ER places her on any new medications.

## 2019-03-04 ENCOUNTER — HOSPITAL ENCOUNTER (OUTPATIENT)
Dept: RADIOLOGY | Facility: MEDICAL CENTER | Age: 79
End: 2019-03-04
Attending: SURGERY
Payer: MEDICARE

## 2019-03-04 ENCOUNTER — OUTPATIENT INFUSION SERVICES (OUTPATIENT)
Dept: ONCOLOGY | Facility: MEDICAL CENTER | Age: 79
End: 2019-03-04
Attending: COLON & RECTAL SURGERY
Payer: MEDICARE

## 2019-03-04 VITALS
HEIGHT: 65 IN | RESPIRATION RATE: 18 BRPM | DIASTOLIC BLOOD PRESSURE: 88 MMHG | BODY MASS INDEX: 27.88 KG/M2 | HEART RATE: 107 BPM | SYSTOLIC BLOOD PRESSURE: 144 MMHG | WEIGHT: 167.33 LBS | OXYGEN SATURATION: 100 % | TEMPERATURE: 98 F

## 2019-03-04 DIAGNOSIS — C20 RECTAL CANCER (HCC): ICD-10-CM

## 2019-03-04 DIAGNOSIS — C78.7 SECONDARY MALIGNANT NEOPLASM OF LIVER (HCC): ICD-10-CM

## 2019-03-04 LAB
ALBUMIN SERPL BCP-MCNC: 3.6 G/DL (ref 3.2–4.9)
ALBUMIN/GLOB SERPL: 1.1 G/DL
ALP SERPL-CCNC: 167 U/L (ref 30–99)
ALT SERPL-CCNC: 19 U/L (ref 2–50)
ANION GAP SERPL CALC-SCNC: 10 MMOL/L (ref 0–11.9)
ANISOCYTOSIS BLD QL SMEAR: ABNORMAL
AST SERPL-CCNC: 22 U/L (ref 12–45)
BASOPHILS # BLD AUTO: 0.9 % (ref 0–1.8)
BASOPHILS # BLD: 0.06 K/UL (ref 0–0.12)
BILIRUB SERPL-MCNC: 0.7 MG/DL (ref 0.1–1.5)
BUN SERPL-MCNC: 19 MG/DL (ref 8–22)
CALCIUM SERPL-MCNC: 9.5 MG/DL (ref 8.5–10.5)
CEA SERPL-MCNC: 1.2 NG/ML (ref 0–3)
CHLORIDE SERPL-SCNC: 108 MMOL/L (ref 96–112)
CO2 SERPL-SCNC: 22 MMOL/L (ref 20–33)
COMMENT 1642: NORMAL
CREAT SERPL-MCNC: 1.01 MG/DL (ref 0.5–1.4)
EOSINOPHIL # BLD AUTO: 0.17 K/UL (ref 0–0.51)
EOSINOPHIL NFR BLD: 2.5 % (ref 0–6.9)
ERYTHROCYTE [DISTWIDTH] IN BLOOD BY AUTOMATED COUNT: 66.1 FL (ref 35.9–50)
GLOBULIN SER CALC-MCNC: 3.2 G/DL (ref 1.9–3.5)
GLUCOSE SERPL-MCNC: 99 MG/DL (ref 65–99)
HCT VFR BLD AUTO: 40.9 % (ref 37–47)
HGB BLD-MCNC: 12.7 G/DL (ref 12–16)
IMM GRANULOCYTES # BLD AUTO: 0.04 K/UL (ref 0–0.11)
IMM GRANULOCYTES NFR BLD AUTO: 0.6 % (ref 0–0.9)
LYMPHOCYTES # BLD AUTO: 1.31 K/UL (ref 1–4.8)
LYMPHOCYTES NFR BLD: 19.2 % (ref 22–41)
MACROCYTES BLD QL SMEAR: ABNORMAL
MCH RBC QN AUTO: 27.6 PG (ref 27–33)
MCHC RBC AUTO-ENTMCNC: 31.1 G/DL (ref 33.6–35)
MCV RBC AUTO: 88.9 FL (ref 81.4–97.8)
MICROCYTES BLD QL SMEAR: ABNORMAL
MONOCYTES # BLD AUTO: 0.79 K/UL (ref 0–0.85)
MONOCYTES NFR BLD AUTO: 11.6 % (ref 0–13.4)
MORPHOLOGY BLD-IMP: NORMAL
NEUTROPHILS # BLD AUTO: 4.45 K/UL (ref 2–7.15)
NEUTROPHILS NFR BLD: 65.2 % (ref 44–72)
NRBC # BLD AUTO: 0 K/UL
NRBC BLD-RTO: 0 /100 WBC
OVALOCYTES BLD QL SMEAR: NORMAL
PLATELET # BLD AUTO: 247 K/UL (ref 164–446)
PLATELET BLD QL SMEAR: NORMAL
PMV BLD AUTO: 9.9 FL (ref 9–12.9)
POIKILOCYTOSIS BLD QL SMEAR: NORMAL
POTASSIUM SERPL-SCNC: 3.7 MMOL/L (ref 3.6–5.5)
PROT SERPL-MCNC: 6.8 G/DL (ref 6–8.2)
RBC # BLD AUTO: 4.6 M/UL (ref 4.2–5.4)
RBC BLD AUTO: PRESENT
SODIUM SERPL-SCNC: 140 MMOL/L (ref 135–145)
WBC # BLD AUTO: 6.8 K/UL (ref 4.8–10.8)

## 2019-03-04 PROCEDURE — 82378 CARCINOEMBRYONIC ANTIGEN: CPT

## 2019-03-04 PROCEDURE — 36591 DRAW BLOOD OFF VENOUS DEVICE: CPT

## 2019-03-04 PROCEDURE — 700111 HCHG RX REV CODE 636 W/ 250 OVERRIDE (IP): Performed by: RADIOLOGY

## 2019-03-04 PROCEDURE — 85025 COMPLETE CBC W/AUTO DIFF WBC: CPT

## 2019-03-04 PROCEDURE — A4212 NON CORING NEEDLE OR STYLET: HCPCS

## 2019-03-04 PROCEDURE — 700117 HCHG RX CONTRAST REV CODE 255: Performed by: SURGERY

## 2019-03-04 PROCEDURE — 80053 COMPREHEN METABOLIC PANEL: CPT

## 2019-03-04 PROCEDURE — 74160 CT ABDOMEN W/CONTRAST: CPT

## 2019-03-04 RX ADMIN — IOHEXOL 100 ML: 350 INJECTION, SOLUTION INTRAVENOUS at 10:00

## 2019-03-04 NOTE — PROGRESS NOTES
"Pt scheduled for port access for use in CT and pre-chemo lab draw. Pt arrived ambulatory, independent; denied pain/discomfort, s/sx infection, or other health changes. L-chest port accessed using sterile technique w/ 1\" power port needle; easily flushed, brisk blood return. Labs collected per orders. Port saline locked for use in CT; pt instructed to return to [facility] for hep lock if not completed by CT. Pt verbalized understanding; denied any unmet needs; discharged ambulatory, independent, NAD.  "

## 2019-03-04 NOTE — PROGRESS NOTES
"Pharmacy Chemotherapy Verification    Patient Name: Karlene Walters      Dx: Colon CA        Protocol: 5-FU + Leucovorin        *Dosing Reference*   IV over 2 hours on Day 1, followed by    IVP on Day 1, followed by         -- 10/31/17: delete Leucovorin and 5-FU push d/t neutropenia per Dr. Hardy   Fluorouracil   IV continuous infusion daily on Days 1 and 2 ( IV over 46-48 hours)        -- 20% reduction (1920 mg/m²) to be continued starting C28 per C Alsop APRN   14 day cycles for 12 cycles (adjuvant) or until disease progression or unacceptable toxicity  NCCN Guidelines for Colon Cancer. V.2.2015.  Jay T, et al. Eur J Cancer.1999;35(9):1343-7.      Allergies:  Codeine; Oxaliplatin; Pcn; Sulfa drugs; and Tape       /67   Pulse (!) 102   Temp 36.4 °C (97.5 °F) (Temporal)   Resp 18   Ht 1.64 m (5' 4.57\")   Wt 77.2 kg (170 lb 3.1 oz)   LMP  (LMP Unknown)   SpO2 98%   BMI 28.70 kg/m²  Body surface area is 1.88 meters squared.    LABS 03/04/19:  ANC ~ 4450 Plt = 247k Hgb = 12.7 SCr = 1.01 mg/dL CrCl ~ 56 mL/min LFTs/Tbili = WNL except alk phos 167 K = 3.7    Drug Order   (Drug name, dose, route, IV Fluid & volume, frequency, number of doses) Cycle 78 (C70 in [place])   Previous treatment: C77 = 02/19/19   Medication = Fluorouracil (5-FU)   Base Dose = 1920 mg/m²  Calc Dose:Base Dose x 1.88 m² = 3609.6 mg  Final Dose = 3610 mg   Route = CIVI  Drug Conc = 50 mg/mL  Fluid & Volume = 72.2 mL (+ 3 mL overfill)  Admin Duration = CIVI over 46 hrs @ 1.6 mL/hr   Via CADD pump for home infusion        <5% difference, OK to treat with final dose     By my signature below, I confirm this process was performed independently with the BSA and all final chemotherapy dosing calculations congruent. I have reviewed the above chemotherapy order and that my calculation of the final dose and BSA (when applicable) corroborate those calculations of the  pharmacist.       Lalit Andrade, PharmD, BCOP            "

## 2019-03-04 NOTE — PROGRESS NOTES
"Pharmacy Chemotherapy Verification      Dx: Colon CA        Cycle 78 ([place] cycle 70)  Previous treatment: 2/19/19    Protocol: 5-FU+Leucovorin      *Dosing Reference*   IV over 2 hours on Day 1, followed by   IVP on Day 1, followed by     - Dose reduced by 20% to  starting with Cycle 18 ([place] #11) on 7/5/16 due to neutropenia     - 10/31/17: delete Leucovorin and 5-FU push d/t neutropenia per Dr. Hardy   Fluorouracil   IV continuous infusion daily on Days 1 and 2 ( IV over 46-48 hours) -     - Dose reduced by 20% starting with Cycle 18 ([place] #11) on 7/5/16 due to neutropenia    - 20% reduction (1920 mg/m2) to be continued starting C28 per C Alsop APRN   14 day cycles for 12 cycles (adjuvant) or until disease progression or unacceptable toxicity  NCCN Guidelines for Colon Cancer. V.2.2015.  Jay T, et al. Eur J Cancer.1999;35(9):1343-7.      Allergies:  Codeine; Oxaliplatin; Pcn; Sulfa drugs; and Tape     /67   Pulse (!) 102   Temp 36.4 °C (97.5 °F) (Temporal)   Resp 18   Ht 1.64 m (5' 4.57\")   Wt 77.2 kg (170 lb 3.1 oz)   LMP  (LMP Unknown)   SpO2 98%   BMI 28.70 kg/m²  Body surface area is 1.88 meters squared.     All lab results 3/4/19 within treatment parameters.      Fluorouracil (5-FU) 1920 mg/m² x 1.88m² = 3609.6mg              <5% difference, OK to treat with final dose = 3610mg    CIVI over 46 hours Via home infusion pump at 1.6mL/hr    Nakia Arroyo, PharmD  "

## 2019-03-05 ENCOUNTER — OUTPATIENT INFUSION SERVICES (OUTPATIENT)
Dept: ONCOLOGY | Facility: MEDICAL CENTER | Age: 79
End: 2019-03-05
Attending: INTERNAL MEDICINE
Payer: MEDICARE

## 2019-03-05 ENCOUNTER — OFFICE VISIT (OUTPATIENT)
Dept: HEMATOLOGY ONCOLOGY | Facility: MEDICAL CENTER | Age: 79
End: 2019-03-05
Payer: MEDICARE

## 2019-03-05 VITALS
HEART RATE: 100 BPM | WEIGHT: 170.09 LBS | OXYGEN SATURATION: 95 % | DIASTOLIC BLOOD PRESSURE: 74 MMHG | RESPIRATION RATE: 16 BRPM | BODY MASS INDEX: 26.7 KG/M2 | SYSTOLIC BLOOD PRESSURE: 136 MMHG | HEIGHT: 67 IN | TEMPERATURE: 97.7 F

## 2019-03-05 VITALS
DIASTOLIC BLOOD PRESSURE: 67 MMHG | HEART RATE: 102 BPM | WEIGHT: 170.19 LBS | SYSTOLIC BLOOD PRESSURE: 137 MMHG | HEIGHT: 65 IN | OXYGEN SATURATION: 98 % | RESPIRATION RATE: 18 BRPM | BODY MASS INDEX: 28.36 KG/M2 | TEMPERATURE: 97.5 F

## 2019-03-05 DIAGNOSIS — C20 RECTAL CANCER (HCC): ICD-10-CM

## 2019-03-05 DIAGNOSIS — D70.1 CHEMOTHERAPY-INDUCED NEUTROPENIA (HCC): ICD-10-CM

## 2019-03-05 DIAGNOSIS — T45.1X5A CHEMOTHERAPY-INDUCED NEUTROPENIA (HCC): ICD-10-CM

## 2019-03-05 DIAGNOSIS — R94.4 DECREASED GFR: ICD-10-CM

## 2019-03-05 PROCEDURE — 96375 TX/PRO/DX INJ NEW DRUG ADDON: CPT

## 2019-03-05 PROCEDURE — 700111 HCHG RX REV CODE 636 W/ 250 OVERRIDE (IP): Performed by: INTERNAL MEDICINE

## 2019-03-05 PROCEDURE — 96374 THER/PROPH/DIAG INJ IV PUSH: CPT | Mod: XU

## 2019-03-05 PROCEDURE — G0498 CHEMO EXTEND IV INFUS W/PUMP: HCPCS

## 2019-03-05 PROCEDURE — 99214 OFFICE O/P EST MOD 30 MIN: CPT | Performed by: INTERNAL MEDICINE

## 2019-03-05 RX ORDER — ONDANSETRON 8 MG/1
8 TABLET, ORALLY DISINTEGRATING ORAL
Status: CANCELLED | OUTPATIENT
Start: 2019-03-05

## 2019-03-05 RX ORDER — ONDANSETRON 2 MG/ML
4 INJECTION INTRAMUSCULAR; INTRAVENOUS
Status: CANCELLED | OUTPATIENT
Start: 2019-03-26

## 2019-03-05 RX ORDER — DEXAMETHASONE SODIUM PHOSPHATE 4 MG/ML
8 INJECTION, SOLUTION INTRA-ARTICULAR; INTRALESIONAL; INTRAMUSCULAR; INTRAVENOUS; SOFT TISSUE ONCE
Status: CANCELLED | OUTPATIENT
Start: 2019-03-26 | End: 2019-03-19

## 2019-03-05 RX ORDER — DEXTROSE MONOHYDRATE 50 MG/ML
INJECTION, SOLUTION INTRAVENOUS CONTINUOUS
Status: CANCELLED | OUTPATIENT
Start: 2019-03-05

## 2019-03-05 RX ORDER — LIDOCAINE HYDROCHLORIDE 10 MG/ML
10 INJECTION, SOLUTION INFILTRATION; PERINEURAL ONCE
Status: CANCELLED | OUTPATIENT
Start: 2019-03-26 | End: 2019-03-19

## 2019-03-05 RX ORDER — ONDANSETRON 8 MG/1
8 TABLET, ORALLY DISINTEGRATING ORAL
Status: CANCELLED | OUTPATIENT
Start: 2019-03-26

## 2019-03-05 RX ORDER — PROCHLORPERAZINE MALEATE 10 MG
10 TABLET ORAL EVERY 6 HOURS PRN
Status: CANCELLED | OUTPATIENT
Start: 2019-03-26

## 2019-03-05 RX ORDER — DEXAMETHASONE SODIUM PHOSPHATE 4 MG/ML
8 INJECTION, SOLUTION INTRA-ARTICULAR; INTRALESIONAL; INTRAMUSCULAR; INTRAVENOUS; SOFT TISSUE ONCE
Status: CANCELLED | OUTPATIENT
Start: 2019-03-05 | End: 2019-03-05

## 2019-03-05 RX ORDER — LIDOCAINE HYDROCHLORIDE 10 MG/ML
10 INJECTION, SOLUTION INFILTRATION; PERINEURAL ONCE
Status: CANCELLED | OUTPATIENT
Start: 2019-03-05 | End: 2019-03-05

## 2019-03-05 RX ORDER — DEXTROSE MONOHYDRATE 50 MG/ML
INJECTION, SOLUTION INTRAVENOUS CONTINUOUS
Status: CANCELLED | OUTPATIENT
Start: 2019-03-26

## 2019-03-05 RX ORDER — ONDANSETRON 2 MG/ML
4 INJECTION INTRAMUSCULAR; INTRAVENOUS
Status: CANCELLED | OUTPATIENT
Start: 2019-03-05

## 2019-03-05 RX ORDER — DEXAMETHASONE SODIUM PHOSPHATE 4 MG/ML
8 INJECTION, SOLUTION INTRA-ARTICULAR; INTRALESIONAL; INTRAMUSCULAR; INTRAVENOUS; SOFT TISSUE ONCE
Status: COMPLETED | OUTPATIENT
Start: 2019-03-05 | End: 2019-03-05

## 2019-03-05 RX ORDER — PROCHLORPERAZINE MALEATE 10 MG
10 TABLET ORAL EVERY 6 HOURS PRN
Status: CANCELLED | OUTPATIENT
Start: 2019-03-05

## 2019-03-05 RX ADMIN — FLUOROURACIL 3610 MG: 50 INJECTION, SOLUTION INTRAVENOUS at 10:20

## 2019-03-05 RX ADMIN — DEXAMETHASONE SODIUM PHOSPHATE 8 MG: 4 INJECTION, SOLUTION INTRA-ARTICULAR; INTRALESIONAL; INTRAMUSCULAR; INTRAVENOUS; SOFT TISSUE at 09:37

## 2019-03-05 ASSESSMENT — PAIN SCALES - GENERAL: PAINLEVEL: NO PAIN

## 2019-03-05 NOTE — PROGRESS NOTES
Date of visit: 3/5/2019  8:33 AM      Chief Complaint- Metastatic rectal cancer to liver-KRS mutated        Identification/Prior relevant history: Originally diagnosed with a rectal carcinoma in 2012 secondary to bleeding. s/pt laparoscopic resection with primary anastomosis and final pathology was consistent with well-differentiated adenocarcinoma with 0/30 nodes. She was staged pT2 N0. 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      8/2015 she was diagnosed with metastatic disease to the liver with a 2.4 cm mass as well as small pulmonary nodules and thyroid nodules. She underwent an ablative therapy to the liver. She was then started on Xelox. She had  allergic reaction to oxaliplatin and was continued on Xeloda. This eventually led to increased diarrhea and her regimen was changed to single agent 5-FU. She has been tolerating the therapy well with good responses on imaging.      3/2017- CT of the chest abdomen pelvis which has been stable and no evidence of disease. CT of the wrist was consistent with tenosynovitis and degenerative changes.     5/17-established care with me.   EGD and colonoscopy  did not reveal any local recurrence. She had some gastritis.   - CEA has trended up to 13  -PET scan isolated hepatic lesion. Patchy basilar pulmonary opacities. Seen by Dr. West referred for Y90 embolization  6/17- Y90 Theraspheres.  Continued on 5-FU.  -CEA trending down/normalized     -CT abdomen and-3/9/18- Findings are consistent with posttreatment changes in this portion of the liver after Y 90 procedure.  Interim history-     Restarted 5-FU maintenance, she tends to develop transient chest pain and some palmar erythema. A few days. Tears, chronic shortness of breath with occasional flareups. Follow-up with pulmonary.  6/29/80-CT chest negative  CEA tumor markers have been trending  up.  9/7/18-No definite new liver lesion identified.     -=10/25/18-PET scan  Hypermetabolic 2.5 cm lesion adjacent/to the right of the low attenuation treatment bed in the hepatic dome, in keeping with persistent residual disease next to the treatment bed  -11/15/18-microwave Overlapping ablations to segment 7 of the liver x3, each being 4 minute at 140 yanes giving us a 4x5 cm ablation cavity overlapping the region of the lateral posterior aspect and inferior aspect of the previous ablation Cavity.     12/10/18- Hypodense component of previously identified lesion in the right hepatic dome has increased in size to 4.6 x 5.0 cm.  Interim history    3/4/19-CT cirrhosis protocol-  Right hepatic dome treated metastasis is stable with no enhancing elements seen. This likely indicates post therapy change with no local recurrence suspected  2.  New from 2017 subcapsular left hepatic 8mm hypodense lesion does not appear to enhance. This most likely is a cyst    She is otherwise doing okay and tolerating maintenance 5-FU reasonably well.  She tend to develop some chest discomfort during the infusion for which she takes oxycodone which has helpedPast Medical History:      Past Medical History:   Diagnosis Date   • Arrhythmia     occasional   • Blood clotting disorder (HCC) 08/2015    bilat PE    • Blood transfusion 1957   • Breath shortness     no O2 with moderate exertion   • Cancer (HCC) 09/2012    rectal cancer,  Liver CA-2015   • CATARACT     removed b/l   • Chemotherapy-induced neutropenia (HCC) 9/28/2016   • Chickenpox    • Colon cancer (HCC)    • Dental disorder     dentures UPPERS AND LOWERS   • Elevated alkaline phosphatase level 11/15/2017   • Emphysema of lung (HCC)    • Fall    • [place] measles    • Heart murmur     as a child   • Hemorrhagic disorder (HCC)     on Xarelto    • Hypercholesteremia    • Hypertension     no meds currently    • Ileostomy in place (HCC)    • Indigestion    • Influenza    •  Lightheadedness 9/17/2015   • Mumps    • Obstruction     ileostomy   • Other specified symptom associated with female genital organs     HYSTERECTOMY 1993   • Pneumonia 05/2017    tx'd with antibx   • Pulmonary embolism (HCC)    • 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)    • Renal insufficiency 3/14/2016   • Routine general medical examination at a health care facility 3/14/2016    3/14/16   • Seasonal allergies    • Swelling of both ankles     Lasix PRN   • Tonsillitis        Past surgical history:       Past Surgical History:   Procedure Laterality Date   • HEPATIC ABLATION LAPAROSCOPIC  11/15/2018    Procedure: HEPATIC ABLATION LAPAROSCOPIC.- SEGMENT 7/8;  Surgeon: Kit West M.D.;  Location: SURGERY [facility];  Service: General   • COLONOSCOPY WITH BIOPSY  8/23/2015    Procedure: COLONOSCOPY WITH BIOPSY;  Surgeon: Wilbur Glasgow M.D.;  Location: ENDOSCOPY [facility];  Service:    • HEPATIC ABLATION LAPAROSCOPIC  7/6/2015    Procedure: HEPATIC ABLATION LAPAROSCOPIC.;  Surgeon: Kit West M.D.;  Location: SURGERY [facility];  Service:    • CATH PLACEMENT Left 7/6/2015    Procedure: CATH PLACEMENT CEPHALIC POWERPORT;  Surgeon: Kit West M.D.;  Location: SURGERY [facility];  Service:    • FISTULA IN ANO REPAIR  1/28/2015    Performed by Kolton Montgomery M.D. at [facility]   • PERINEAL PROCEDURE  1/28/2015    Performed by Kolton Montgomery M.D. at [facility]   • FISTULA IN ANO REPAIR  10/15/2014    Performed by Kolton Montgomery M.D. at [facility]   • PERINEAL PROCEDURE  10/15/2014    Performed by Kolton Montgomery M.D. at [facility]   • IRRIGATION & DEBRIDEMENT GENERAL  2/19/2014    Performed by Kolton Montgomery M.D. at [facility]   • FLAP GRAFT  2/19/2014    Performed by Kolton Montgomery M.D. at [facility]   • PERINEAL PROCEDURE  12/18/2013    Performed by Kolton Montgomery M.D. at SURGERY  [facility]   • MYOCUTANEOUS FLAP  12/18/2013    Performed by Kolton Montgomery M.D. at SURGERY [facility]   • IRRIGATION & DEBRIDEMENT GENERAL  10/23/2013    Performed by Kolton Montgomery M.D. at SURGERY [facility]   • FISTULA IN ANO REPAIR  9/4/2013    Performed by Kolton Montgomery M.D. at SURGERY [facility]   • FLAP ROTATION  9/4/2013    Performed by Kolton Montgomery M.D. at SURGERY [facility]   • RECOVERY  8/26/2013    Performed by Cath-Recovery Surgery at [facility] SAME DAY [facility]   • BIOPSY GENERAL  7/17/2013    Performed by Kolton Montgomery M.D. at SURGERY [facility]   • PROCTOSCOPY  7/17/2013    Performed by Kolton Montgomery M.D. at [facility]   • FISTULA IN ANO REPAIR  2/27/2013    Performed by Kolton Montgomery M.D. at SURGERY [facility]   • SIGMOIDOSCOPY  2/27/2013    Performed by Kolton Montgomery M.D. at SURGERY [facility]   • LAPAROSCOPY  10/8/2012    Performed by Kolton Montgomery M.D. at [facility]   • ILEO LOOP DIVERSION  10/8/2012    Performed by Kolton Montgomery M.D. at [facility]   • COLECTOMY  9/26/2012    Performed by Kolton Montgomery M.D. at [facility]   • COLONOSCOPY FLEX W/ENDO US  9/20/2012    Performed by Harshil Bolton M.D. at [facility]   • OTHER  2009    left eye torn retina repair   • CATARACT EXTRACTION WITH IOL  2004    bilateral   • ABDOMINAL HYSTERECTOMY TOTAL  1983   • CYST EXCISION  1972    ovarian   • COLON RESECTION     • EYE SURGERY      cataracts   • HYSTERECTOMY LAPAROSCOPY     • PB REMV 2ND CATARACT,CORN-SCLER SECTN     • TONSILLECTOMY         Allergies:       Oxaliplatin; Codeine; Pcn [penicillins]; Sulfa drugs; and Tape    Medications:         Current Outpatient Prescriptions   Medication Sig Dispense Refill   • lidocaine-prilocaine (EMLA) 2.5-2.5 % Cream APPLY A THICK FILM OVER THE PORT ACCESS SITE PRIOR TO ACCESS 30 g 3   • ondansetron (ZOFRAN) 4 MG Tab tablet Take 1 Tab by mouth as needed. 30 Tab  3   • rivaroxaban (XARELTO) 10 MG Tab tablet Take 10 mg by mouth.     • Tiotropium Bromide-Olodaterol (STIOLTO RESPIMAT) 2.5-2.5 MCG/ACT Aero Soln Take 2 Puffs by mouth every day. 1 Inhaler 11   • albuterol (PROAIR HFA) 108 (90 Base) MCG/ACT Aero Soln inhalation aerosol Inhale 2 Puffs by mouth every four hours as needed for Shortness of Breath (wheezing). 1 Inhaler 11   • furosemide (LASIX) 40 MG Tab Take 1 Tab by mouth every day. 90 Tab 3   • raNITidine (ZANTAC) 150 MG Tab TAKE 1 TABLET 2 TIMES A DAY 60 Tab 3   • NON SPECIFIED by Intravenous route every 14 days. Chemo 5 FU      • isosorbide mononitrate SR (IMDUR) 30 MG TABLET SR 24 HR Take 1 Tab by mouth every morning. (Patient not taking: Reported on 2/5/2019) 30 Tab 3   • metronidazole (METROCREAM) 0.75 % cream Apply 1 Each to affected area(s) 2 times a day.     • potassium chloride ER (KLOR-CON) 10 MEQ tablet TAKE ONE TABLET EVERY DAY AS NEEDED 30 Tab 6   • loperamide (IMODIUM) 2 MG Cap Take 2 mg by mouth 4 times a day as needed for Diarrhea.     • cyanocobalamin (VITAMIN B-12) 500 MCG Tab Take 500 mcg by mouth every day.     • Multiple Vitamins-Minerals (CENTRUM SILVER PO) Take 1 Tab by mouth every day.     • Cholecalciferol (VITAMIN D3) 400 UNITS CAPS Take 1 Cap by mouth every day.     • loratadine (CLARITIN) 10 MG TABS Take 10 mg by mouth every day. Indications: Hayfever       No current facility-administered medications for this visit.          Social History:     Social History     Social History   • Marital status:      Spouse name: N/A   • Number of children: N/A   • Years of education: N/A     Occupational History   • Not on file.     Social History Main Topics   • Smoking status: Never Smoker   • Smokeless tobacco: Never Used   • Alcohol use No      Comment: rare   • Drug use: No   • Sexual activity: No     Other Topics Concern   • Not on file     Social History Narrative   • No narrative on file       Family History:      Family History    Problem Relation Age of Onset   • Heart Disease Mother    • Heart Failure Mother    • Hypertension Mother    • Hyperlipidemia Mother    • Cancer Mother    • Cancer Maternal Aunt 60        breast ca   • Lung Disease Brother         COPD/Emphysema/Smoker   • Cancer Brother         prostate cancer   • Alcohol/Drug Brother         Alcohol   • Kidney Disease Father         renal failure       Review of Systems:  All other review of systems are negative except what was mentioned above in the HPI.    Constitutional: Negative for fever, chills, weight loss and malaise/fatigue.    HEENT: No new auditory or visual complaints. No sore throat and neck pain.     Respiratory: Negative for cough, sputum production, shortness of breath and wheezing.    Cardiovascular: Negative for chest pain, palpitations, orthopnea and leg swelling.    Gastrointestinal: Negative for heartburn, nausea, vomiting and abdominal pain.    Genitourinary: Negative for dysuria, hematuria    Musculoskeletal: No new arthralgias or myalgias   Skin: Negative for rash and itching.    Neurological: Negative for focal weakness and headaches.    Endo/Heme/Allergies: No abnormal bleed/bruise.    Psychiatric/Behavioral: No new depression/anxiety.    Physical Exam:  Vitals: LMP  (LMP Unknown)     General: Not in acute distress, alert and oriented x 3  HEENT: No pallor, icterus. Oropharynx clear.   Neck: Supple, no palpable masses.  Lymph nodes: No palpable cervical, supraclavicular, axillary or inguinal lymphadenopathy.    CVS: regular rate and rhythm, no rubs or gallops  RESP: Clear to auscultate bilaterally, no wheezing or crackles.   ABD: Soft, non tender, non distended, positive bowel sounds, no palpable organomegaly  EXT: No edema or cyanosis  CNS: Alert and oriented x3, No focal deficits.  Skin- No rash      Labs:   Outpatient Infusion Services on 03/04/2019   Component Date Value Ref Range Status   • WBC 03/04/2019 6.8  4.8 - 10.8 K/uL Final   • RBC  03/04/2019 4.60  4.20 - 5.40 M/uL Final   • Hemoglobin 03/04/2019 12.7  12.0 - 16.0 g/dL Final   • Hematocrit 03/04/2019 40.9  37.0 - 47.0 % Final   • MCV 03/04/2019 88.9  81.4 - 97.8 fL Final   • MCH 03/04/2019 27.6  27.0 - 33.0 pg Final   • MCHC 03/04/2019 31.1* 33.6 - 35.0 g/dL Final   • RDW 03/04/2019 66.1* 35.9 - 50.0 fL Final   • Platelet Count 03/04/2019 247  164 - 446 K/uL Final   • MPV 03/04/2019 9.9  9.0 - 12.9 fL Final   • Neutrophils-Polys 03/04/2019 65.20  44.00 - 72.00 % Final   • Lymphocytes 03/04/2019 19.20* 22.00 - 41.00 % Final   • Monocytes 03/04/2019 11.60  0.00 - 13.40 % Final   • Eosinophils 03/04/2019 2.50  0.00 - 6.90 % Final   • Basophils 03/04/2019 0.90  0.00 - 1.80 % Final   • Immature Granulocytes 03/04/2019 0.60  0.00 - 0.90 % Final   • Nucleated RBC 03/04/2019 0.00  /100 WBC Final   • Neutrophils (Absolute) 03/04/2019 4.45  2.00 - 7.15 K/uL Final    Includes immature neutrophils, if present.   • Lymphs (Absolute) 03/04/2019 1.31  1.00 - 4.80 K/uL Final   • Monos (Absolute) 03/04/2019 0.79  0.00 - 0.85 K/uL Final   • Eos (Absolute) 03/04/2019 0.17  0.00 - 0.51 K/uL Final   • Baso (Absolute) 03/04/2019 0.06  0.00 - 0.12 K/uL Final   • Immature Granulocytes (abs) 03/04/2019 0.04  0.00 - 0.11 K/uL Final   • NRBC (Absolute) 03/04/2019 0.00  K/uL Final   • Anisocytosis 03/04/2019 2+   Final   • Macrocytosis 03/04/2019 1+   Final   • Microcytosis 03/04/2019 1+   Final   • Sodium 03/04/2019 140  135 - 145 mmol/L Final   • Potassium 03/04/2019 3.7  3.6 - 5.5 mmol/L Final   • Chloride 03/04/2019 108  96 - 112 mmol/L Final   • Co2 03/04/2019 22  20 - 33 mmol/L Final   • Anion Gap 03/04/2019 10.0  0.0 - 11.9 Final   • Glucose 03/04/2019 99  65 - 99 mg/dL Final   • Bun 03/04/2019 19  8 - 22 mg/dL Final   • Creatinine 03/04/2019 1.01  0.50 - 1.40 mg/dL Final   • Calcium 03/04/2019 9.5  8.5 - 10.5 mg/dL Final   • AST(SGOT) 03/04/2019 22  12 - 45 U/L Final   • ALT(SGPT) 03/04/2019 19  2 - 50 U/L Final    • Alkaline Phosphatase 03/04/2019 167* 30 - 99 U/L Final   • Total Bilirubin 03/04/2019 0.7  0.1 - 1.5 mg/dL Final   • Albumin 03/04/2019 3.6  3.2 - 4.9 g/dL Final   • Total Protein 03/04/2019 6.8  6.0 - 8.2 g/dL Final   • Globulin 03/04/2019 3.2  1.9 - 3.5 g/dL Final   • A-G Ratio 03/04/2019 1.1  g/dL Final   • Carcinoembryonic Antigen 03/04/2019 1.2  0.0 - 3.0 ng/mL Final    Comment: Effective September 17, 2013 the quantitative determination  of Carcinoembryonic Antigen (CEA) will now be performed at  [facility].  The Access CEA paramagnetic  particle chemiluminescent immunoassay is used.  Results  obtained with different test methods or kits cannot be used interchangeably.  Measurement of CEA has been shown to be  clinically relevant in the management of patients with  colorectal, breast, lung, prostatic, pancreatic, and ovarian  carcinomas.  Smokers may have slightly elevated levels of CEA.     • GFR If  03/04/2019 >60  >60 mL/min/1.73 m 2 Final   • GFR If Non  03/04/2019 53* >60 mL/min/1.73 m 2 Final   • Peripheral Smear Review 03/04/2019 see below   Final    Comment: Due to instrument suspect flags, further review of peripheral smear is  indicated on this patient sample. This review may or may not result in  abnormal findings.     • Plt Estimation 03/04/2019 Normal   Final   • RBC Morphology 03/04/2019 Present   Final   • Poikilocytosis 03/04/2019 1+   Final   • Ovalocytes 03/04/2019 1+   Final   • Comments-Diff 03/04/2019 see below   Final    Results have been verified by peripheral blood smear review.             Assessment and Plan:  Metastatic rectal cancer, KRAS mutated: She had a rather indolent course and has been on Xeloda for few months in  2015 followed by 5-FU maintenance since early 2016.,   She had isolated relapse in the liver. She is status post Y90 theraspheres 6/2017 with normalization of her CEA level. Posttreatment images are consistent with  treated metastatic lesion. She resumed  the 5-FU infusion after a short break.  Subsequently developed some progression which was limited to the liver for which she underwent microwave ablation with normalization of the CEA levels.  PET scan did not reveal any other sites of disease.  CEA has improved to 2. Given the Optimox data and her reasonably good tolerance, recommended continuing maintenance 5-FU.  She also feels more comfortable being on maintenance chemo.Reviewed the follow-up CT scan which showed no evidence of progression.  CEA level are within normal limits and we will monitor this.    Exertional dyspnea-6/2018 CT of the chest, grossly unremarkable. She feels better with inhalers. She will follow up with pulmonary. Agree with exercise recommendation.     3 History of pulmonary embolus: He was diagnosed in 8/2015 and was initially treated with Coumadin then changed to Xarelto. She had completed 6 months of therapy but was continued on anticoagulation secondary to underlying malignancy.. She did have significant clot burden and pulmonary infarction at the time of presentation.. She  had some bleeding around the stoma and she is currently on dose reduced Eliquis  2.5 mg twice a day.     4 Anal repair and reconstruction: Patient was seen by Dr. Montgomery and has had multiple surgeries to this area. Further surgery has been deferred. She continues to follow-up with Dr. Montgomery periodically    She is planning to go for a cruise and will accommodate chemotherapy around her preferred dates.  She can be seen with every alternate second or third round of 5-FU.    She agreed and verbalized  agreement and understanding with the current plan.  I answered all questions and concerns at this time         Please note that this dictation was created using voice recognition software. I have made every reasonable attempt to correct obvious errors, but I expect that there are errors of grammar and possibly content that I did not  discover before finalizing the note.      SIGNATURES:  Eric Hardy    CC:  Manan Quiñonez M.D.  No ref. provider found

## 2019-03-05 NOTE — PROGRESS NOTES
Patient presents for Adrucil home infusion pump connection. Patient had labs drawn yesterday, results within parameters to proceed today. Port accessed yesterday, flushes well with brisk blood return. Patient connected to home infusion pump as ordered. Patient returns Thursday to be de-accessed and released in no acute distress with her .

## 2019-03-05 NOTE — PROGRESS NOTES
Chemotherapy Verification - SECONDARY RN     [id number]    Height = 164 cm  Weight = 77.2 lg  BSA = 1.88 m2       Medication: Fluorouracil (5FU)  Dose: 1920 mg/m2  Calculated Dose: 3609.6 mg over 46 hours                               I confirm that this process was performed independently.

## 2019-03-05 NOTE — PROGRESS NOTES
Chemotherapy Verification - PRIMARY RN      Height = 164 cm  Weight = 77.2 kg  BSA = 1.88 m2       Medication: Adrucil  Dose: 1,920 mg/m2 over 46 hours  Calculated Dose: 3,609.6 mg                            (In mg/m2, AUC, mg/kg)         I confirm this process was performed independently with the BSA and all final chemotherapy dosing calculations congruent.  Any discrepancies of 5% or greater have been addressed with the chemotherapy pharmacist. The resolution of the discrepancy has been documented in the EPIC progress notes.

## 2019-03-06 ENCOUNTER — OFFICE VISIT (OUTPATIENT)
Dept: CARDIOLOGY | Facility: MEDICAL CENTER | Age: 79
End: 2019-03-06
Payer: MEDICARE

## 2019-03-06 VITALS
HEART RATE: 95 BPM | DIASTOLIC BLOOD PRESSURE: 80 MMHG | BODY MASS INDEX: 28.49 KG/M2 | WEIGHT: 171 LBS | HEIGHT: 65 IN | SYSTOLIC BLOOD PRESSURE: 140 MMHG | OXYGEN SATURATION: 96 %

## 2019-03-06 DIAGNOSIS — R06.02 SHORTNESS OF BREATH: ICD-10-CM

## 2019-03-06 DIAGNOSIS — E78.5 HYPERLIPIDEMIA, UNSPECIFIED HYPERLIPIDEMIA TYPE: ICD-10-CM

## 2019-03-06 DIAGNOSIS — R74.8 ELEVATED ALKALINE PHOSPHATASE LEVEL: ICD-10-CM

## 2019-03-06 DIAGNOSIS — N28.9 RENAL INSUFFICIENCY: ICD-10-CM

## 2019-03-06 DIAGNOSIS — I10 ESSENTIAL HYPERTENSION: ICD-10-CM

## 2019-03-06 DIAGNOSIS — K59.1 FUNCTIONAL DIARRHEA: ICD-10-CM

## 2019-03-06 DIAGNOSIS — R94.31 ABNORMAL EKG: ICD-10-CM

## 2019-03-06 DIAGNOSIS — C78.7 SECONDARY MALIGNANT NEOPLASM OF LIVER (HCC): Chronic | ICD-10-CM

## 2019-03-06 DIAGNOSIS — E86.0 DEHYDRATION: ICD-10-CM

## 2019-03-06 DIAGNOSIS — D70.1 CHEMOTHERAPY-INDUCED NEUTROPENIA (HCC): ICD-10-CM

## 2019-03-06 DIAGNOSIS — T45.1X5A CHEMOTHERAPY-INDUCED NEUTROPENIA (HCC): ICD-10-CM

## 2019-03-06 DIAGNOSIS — Z86.711 HISTORY OF PULMONARY EMBOLISM: ICD-10-CM

## 2019-03-06 DIAGNOSIS — R73.9 HYPERGLYCEMIA: ICD-10-CM

## 2019-03-06 DIAGNOSIS — R52 UNCONTROLLED PAIN: ICD-10-CM

## 2019-03-06 DIAGNOSIS — J43.9 PULMONARY EMPHYSEMA, UNSPECIFIED EMPHYSEMA TYPE (HCC): Chronic | ICD-10-CM

## 2019-03-06 DIAGNOSIS — R94.4 DECREASED GFR: ICD-10-CM

## 2019-03-06 DIAGNOSIS — R60.0 LOCALIZED EDEMA: ICD-10-CM

## 2019-03-06 PROCEDURE — 99214 OFFICE O/P EST MOD 30 MIN: CPT | Performed by: INTERNAL MEDICINE

## 2019-03-06 ASSESSMENT — ENCOUNTER SYMPTOMS
STRIDOR: 0
MUSCULOSKELETAL NEGATIVE: 1
HEMOPTYSIS: 0
EYES NEGATIVE: 1
WHEEZING: 0
CLAUDICATION: 0
SORE THROAT: 0
CHILLS: 0
PALPITATIONS: 0
SPUTUM PRODUCTION: 0
ORTHOPNEA: 0
PND: 0
FEVER: 0
DIZZINESS: 0
CONSTITUTIONAL NEGATIVE: 1
SHORTNESS OF BREATH: 0
WEAKNESS: 0
GASTROINTESTINAL NEGATIVE: 1
NEUROLOGICAL NEGATIVE: 1
RESPIRATORY NEGATIVE: 1
CARDIOVASCULAR NEGATIVE: 1
LOSS OF CONSCIOUSNESS: 0
COUGH: 0
BRUISES/BLEEDS EASILY: 0

## 2019-03-06 NOTE — LETTER
[facility]   [address] [place] [address] [address]  [place] [address]  Phone: [phone]  Fax: [phone]              Karlene Walters  1940    Encounter Date: 3/6/2019    Ahsan Haque M.D.          PROGRESS NOTE:  Chief Complaint   Patient presents with   • Hyperlipidemia       Subjective:   Karlene Walters is a 78 y.o. female who presents today as a follow-up for her hypertension abnormal EKG PE and palpitations.  Since we last saw her she continues to be on chemotherapy.  She is continuing to take her Lasix as needed.  She continues on the Xarelto for her history of a DVT PE.  She is scheduled to see her surgeon tomorrow.  She had a PET scan last year which showed an EF of 80%.    Past Medical History:   Diagnosis Date   • Arrhythmia     occasional   • Blood clotting disorder (HCC) 08/2015    bilat PE    • Blood transfusion 1957   • Breath shortness     no O2 with moderate exertion   • Cancer (HCC) 09/2012    rectal cancer,  Liver CA-2015   • CATARACT     removed b/l   • Chemotherapy-induced neutropenia (HCC) 9/28/2016   • Chickenpox    • Colon cancer (HCC)    • Dental disorder     dentures UPPERS AND LOWERS   • Elevated alkaline phosphatase level 11/15/2017   • Emphysema of lung (HCC)    • Fall    • [place] measles    • Heart murmur     as a child   • Hemorrhagic disorder (HCC)     on Xarelto    • Hypercholesteremia    • Hypertension     no meds currently    • Ileostomy in place (HCC)    • Indigestion    • Influenza    • Lightheadedness 9/17/2015   • Mumps    • Obstruction     ileostomy   • Other specified symptom associated with female genital organs     HYSTERECTOMY 1993   • Pneumonia 05/2017    tx'd with antibx   • Pulmonary embolism (HCC)    • 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)    • Renal insufficiency 3/14/2016   • Routine general medical examination at a health care facility 3/14/2016    3/14/16   • Seasonal allergies    • Swelling of  both ankles     Lasix PRN   • Tonsillitis      Past Surgical History:   Procedure Laterality Date   • HEPATIC ABLATION LAPAROSCOPIC  11/15/2018    Procedure: HEPATIC ABLATION LAPAROSCOPIC.- SEGMENT 7/8;  Surgeon: Kit West M.D.;  Location: SURGERY [facility];  Service: General   • COLONOSCOPY WITH BIOPSY  8/23/2015    Procedure: COLONOSCOPY WITH BIOPSY;  Surgeon: Wilbur Glasgow M.D.;  Location: [facility];  Service:    • HEPATIC ABLATION LAPAROSCOPIC  7/6/2015    Procedure: HEPATIC ABLATION LAPAROSCOPIC.;  Surgeon: Kit West M.D.;  Location: SURGERY [facility];  Service:    • CATH PLACEMENT Left 7/6/2015    Procedure: CATH PLACEMENT CEPHALIC POWERPORT;  Surgeon: Kit West M.D.;  Location: SURGERY [facility];  Service:    • FISTULA IN ANO REPAIR  1/28/2015    Performed by Kolton Montgomery M.D. at [facility]   • PERINEAL PROCEDURE  1/28/2015    Performed by Kolton Montgomery M.D. at [facility]   • FISTULA IN ANO REPAIR  10/15/2014    Performed by Kolton Montgomery M.D. at [facility]   • PERINEAL PROCEDURE  10/15/2014    Performed by Kolton Montgomery M.D. at [facility]   • IRRIGATION & DEBRIDEMENT GENERAL  2/19/2014    Performed by Kolton Montgomery M.D. at [facility]   • FLAP GRAFT  2/19/2014    Performed by Kolton Montgomery M.D. at [facility]   • PERINEAL PROCEDURE  12/18/2013    Performed by Kolton Montgomery M.D. at [facility]   • MYOCUTANEOUS FLAP  12/18/2013    Performed by Kolton Montgomery M.D. at [facility]   • IRRIGATION & DEBRIDEMENT GENERAL  10/23/2013    Performed by Kolton Montgomery M.D. at [facility]   • FISTULA IN ANO REPAIR  9/4/2013    Performed by Kolton Montgomery M.D. at [facility]   • FLAP ROTATION  9/4/2013    Performed by Kolton Montgomery M.D. at [facility]   • RECOVERY  8/26/2013    Performed by Cath-Recovery Surgery  at SURGERY SAME DAY [facility] ORS   • BIOPSY GENERAL  7/17/2013    Performed by Kolton Montgomery M.D. at SURGERY [facility]S   • PROCTOSCOPY  7/17/2013    Performed by Kolton Montgomery M.D. at SURGERY [facility]   • FISTULA IN ANO REPAIR  2/27/2013    Performed by Kolton Montgomery M.D. at SURGERY [facility]S   • SIGMOIDOSCOPY  2/27/2013    Performed by Kolton Montgomery M.D. at SURGERY [facility]S   • LAPAROSCOPY  10/8/2012    Performed by Kolton Montgomery M.D. at SURGERY [facility]S   • ILEO LOOP DIVERSION  10/8/2012    Performed by Kolton Montgomery M.D. at SURGERY [facility]S   • COLECTOMY  9/26/2012    Performed by Kolton Montgomery M.D. at SURGERY [facility]S   • COLONOSCOPY FLEX W/ENDO US  9/20/2012    Performed by Harshil Bolton M.D. at SURGERY [facility]   • OTHER  2009    left eye torn retina repair   • CATARACT EXTRACTION WITH IOL  2004    bilateral   • ABDOMINAL HYSTERECTOMY TOTAL  1983   • CYST EXCISION  1972    ovarian   • COLON RESECTION     • EYE SURGERY      cataracts   • HYSTERECTOMY LAPAROSCOPY     • PB REMV 2ND CATARACT,CORN-SCLER SECTN     • TONSILLECTOMY       Family History   Problem Relation Age of Onset   • Heart Disease Mother    • Heart Failure Mother    • Hypertension Mother    • Hyperlipidemia Mother    • Cancer Mother    • Cancer Maternal Aunt 60        breast ca   • Lung Disease Brother         COPD/Emphysema/Smoker   • Cancer Brother         prostate cancer   • Alcohol/Drug Brother         Alcohol   • Kidney Disease Father         renal failure     Social History     Social History   • Marital status:      Spouse name: N/A   • Number of children: N/A   • Years of education: N/A     Occupational History   • Not on file.     Social History Main Topics   • Smoking status: Never Smoker   • Smokeless tobacco: Never Used   • Alcohol use No      Comment: rare   • Drug use: No   • Sexual activity: No     Other Topics Concern   • Not on file     Social History Narrative   • No  "narrative on file     Allergies   Allergen Reactions   • Oxaliplatin Anaphylaxis   • Codeine      \"gets drunk\"   • Pcn [Penicillins] Itching   • Sulfa Drugs Itching   • Tape Rash     PAPER TAPE OK     Outpatient Encounter Prescriptions as of 3/6/2019   Medication Sig Dispense Refill   • lidocaine-prilocaine (EMLA) 2.5-2.5 % Cream APPLY A THICK FILM OVER THE PORT ACCESS SITE PRIOR TO ACCESS 30 g 3   • ondansetron (ZOFRAN) 4 MG Tab tablet Take 1 Tab by mouth as needed. 30 Tab 3   • rivaroxaban (XARELTO) 10 MG Tab tablet Take 10 mg by mouth.     • Tiotropium Bromide-Olodaterol (STIOLTO RESPIMAT) 2.5-2.5 MCG/ACT Aero Soln Take 2 Puffs by mouth every day. 1 Inhaler 11   • albuterol (PROAIR HFA) 108 (90 Base) MCG/ACT Aero Soln inhalation aerosol Inhale 2 Puffs by mouth every four hours as needed for Shortness of Breath (wheezing). 1 Inhaler 11   • furosemide (LASIX) 40 MG Tab Take 1 Tab by mouth every day. 90 Tab 3   • raNITidine (ZANTAC) 150 MG Tab TAKE 1 TABLET 2 TIMES A DAY 60 Tab 3   • NON SPECIFIED by Intravenous route every 14 days. Chemo 5 FU      • metronidazole (METROCREAM) 0.75 % cream Apply 1 Each to affected area(s) 2 times a day.     • potassium chloride ER (KLOR-CON) 10 MEQ tablet TAKE ONE TABLET EVERY DAY AS NEEDED 30 Tab 6   • loperamide (IMODIUM) 2 MG Cap Take 2 mg by mouth 4 times a day as needed for Diarrhea.     • cyanocobalamin (VITAMIN B-12) 500 MCG Tab Take 500 mcg by mouth every day.     • Multiple Vitamins-Minerals (CENTRUM SILVER PO) Take 1 Tab by mouth every day.     • Cholecalciferol (VITAMIN D3) 400 UNITS CAPS Take 1 Cap by mouth every day.     • isosorbide mononitrate SR (IMDUR) 30 MG TABLET SR 24 HR Take 1 Tab by mouth every morning. (Patient not taking: Reported on 3/5/2019) 30 Tab 3   • loratadine (CLARITIN) 10 MG TABS Take 10 mg by mouth every day. Indications: Hayfever       No facility-administered encounter medications on file as of 3/6/2019.      Review of Systems   Constitutional: " "Negative.  Negative for chills, fever and malaise/fatigue.   HENT: Negative.  Negative for sore throat.    Eyes: Negative.    Respiratory: Negative.  Negative for cough, hemoptysis, sputum production, shortness of breath, wheezing and stridor.    Cardiovascular: Negative.  Negative for chest pain, palpitations, orthopnea, claudication, leg swelling and PND.   Gastrointestinal: Negative.    Genitourinary: Negative.    Musculoskeletal: Negative.    Skin: Negative.    Neurological: Negative.  Negative for dizziness, loss of consciousness and weakness.   Endo/Heme/Allergies: Negative.  Does not bruise/bleed easily.   All other systems reviewed and are negative.       Objective:   /80 (BP Location: Left arm, Patient Position: Sitting, BP Cuff Size: Adult)   Pulse 95   Ht 1.64 m (5' 4.57\")   Wt 77.6 kg (171 lb)   LMP  (LMP Unknown)   SpO2 96%   BMI 28.84 kg/m²      Physical Exam   Constitutional: She appears well-developed and well-nourished. No distress.   HENT:   Head: Normocephalic and atraumatic.   Right Ear: External ear normal.   Left Ear: External ear normal.   Nose: Nose normal.   Mouth/Throat: No oropharyngeal exudate.   Eyes: Pupils are equal, round, and reactive to light. Conjunctivae and EOM are normal. Right eye exhibits no discharge. Left eye exhibits no discharge. No scleral icterus.   Neck: Neck supple. No JVD present.   Cardiovascular: Normal rate, regular rhythm and intact distal pulses.  Exam reveals no gallop and no friction rub.    No murmur heard.  Pulmonary/Chest: Effort normal. No stridor. No respiratory distress. She has no wheezes. She has no rales. She exhibits no tenderness.   Abdominal: Soft. She exhibits no distension. There is no guarding.   Musculoskeletal: Normal range of motion. She exhibits no edema, tenderness or deformity.   Neurological: She is alert. She has normal reflexes. She displays normal reflexes. No cranial nerve deficit. She exhibits normal muscle tone. " Coordination normal.   Skin: Skin is warm and dry. No rash noted. She is not diaphoretic. No erythema. No pallor.   Psychiatric: She has a normal mood and affect. Her behavior is normal. Judgment and thought content normal.   Nursing note and vitals reviewed.      Assessment:     1. Abnormal EKG     2. Chemotherapy-induced neutropenia (HCC)     3. Decreased GFR     4. Dehydration     5. Functional diarrhea     6. Elevated alkaline phosphatase level     7. History of pulmonary embolism     8. Hyperglycemia     9. Hyperlipidemia, unspecified hyperlipidemia type     10. Essential hypertension     11. Localized edema     12. Pulmonary emphysema, unspecified emphysema type (HCC)     13. Renal insufficiency     14. Secondary malignant neoplasm of liver (HCC)     15. Shortness of breath     16. Uncontrolled pain         Medical Decision Making:  Today's Assessment / Status / Plan:     70-year-old female with PE shortness of breath palpitations and some edema.  At this point she continued to take the Lasix as needed.  She is concerned that this is increasing in use but she still barely using it.  I have reassured her that this is okay.  She will stay on the oral and coagulation for her history of PE DVT.  Otherwise I have no changes to make.  I do not feel that we need to be more aggressive in treating her cardiac issues as I think they are mostly benign given the treatment she is going through.  We will see her back in 3 months.    1. Orthostatic tachycardia  - resolved     2. Shortness of breath with exertion and PEs, normal echo  - cont Rivaroxiban 10     3. LE Edema    - lasix 40 PRN  Thank for you allowing me to take part in your patient's care, please call should you have any questions or would like to discuss this patient.      Manan Quiñonez M.D.  [address] [place] [address] [place] [id number]  VIA In Basket

## 2019-03-06 NOTE — PROGRESS NOTES
Chief Complaint   Patient presents with   • Hyperlipidemia       Subjective:   Karlene Walters is a 78 y.o. female who presents today as a follow-up for her hypertension abnormal EKG PE and palpitations.  Since we last saw her she continues to be on chemotherapy.  She is continuing to take her Lasix as needed.  She continues on the Xarelto for her history of a DVT PE.  She is scheduled to see her surgeon tomorrow.  She had a PET scan last year which showed an EF of 80%.    Past Medical History:   Diagnosis Date   • Arrhythmia     occasional   • Blood clotting disorder (HCC) 08/2015    bilat PE    • Blood transfusion 1957   • Breath shortness     no O2 with moderate exertion   • Cancer (HCC) 09/2012    rectal cancer,  Liver CA-2015   • CATARACT     removed b/l   • Chemotherapy-induced neutropenia (HCC) 9/28/2016   • Chickenpox    • Colon cancer (HCC)    • Dental disorder     dentures UPPERS AND LOWERS   • Elevated alkaline phosphatase level 11/15/2017   • Emphysema of lung (HCC)    • Fall    • [place] measles    • Heart murmur     as a child   • Hemorrhagic disorder (HCC)     on Xarelto    • Hypercholesteremia    • Hypertension     no meds currently    • Ileostomy in place (HCC)    • Indigestion    • Influenza    • Lightheadedness 9/17/2015   • Mumps    • Obstruction     ileostomy   • Other specified symptom associated with female genital organs     HYSTERECTOMY 1993   • Pneumonia 05/2017    tx'd with antibx   • Pulmonary embolism (HCC)    • 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)    • Renal insufficiency 3/14/2016   • Routine general medical examination at a health care facility 3/14/2016    3/14/16   • Seasonal allergies    • Swelling of both ankles     Lasix PRN   • Tonsillitis      Past Surgical History:   Procedure Laterality Date   • HEPATIC ABLATION LAPAROSCOPIC  11/15/2018    Procedure: HEPATIC ABLATION LAPAROSCOPIC.- SEGMENT 7/8;  Surgeon: Kit West M.D.;  Location: SURGERY [facility];  Service: General   • COLONOSCOPY WITH BIOPSY  8/23/2015    Procedure: COLONOSCOPY WITH BIOPSY;  Surgeon: Wilbur Glasgow M.D.;  Location: [facility];  Service:    • HEPATIC ABLATION LAPAROSCOPIC  7/6/2015    Procedure: HEPATIC ABLATION LAPAROSCOPIC.;  Surgeon: Kit West M.D.;  Location: SURGERY [facility];  Service:    • CATH PLACEMENT Left 7/6/2015    Procedure: CATH PLACEMENT CEPHALIC POWERPORT;  Surgeon: Kit West M.D.;  Location: SURGERY [facility];  Service:    • FISTULA IN ANO REPAIR  1/28/2015    Performed by Kolton Montgomery M.D. at SURGERY [facility]   • PERINEAL PROCEDURE  1/28/2015    Performed by Kolton Montgomery M.D. at [facility]   • FISTULA IN ANO REPAIR  10/15/2014    Performed by Kolton Montgomery M.D. at [facility]   • PERINEAL PROCEDURE  10/15/2014    Performed by Kolton Montgomery M.D. at SURGERY [facility]   • IRRIGATION & DEBRIDEMENT GENERAL  2/19/2014    Performed by Kolton Montgomery M.D. at [facility]   • FLAP GRAFT  2/19/2014    Performed by Kolton Montgomery M.D. at [facility]   • PERINEAL PROCEDURE  12/18/2013    Performed by Kolton Montgomery M.D. at [facility]   • MYOCUTANEOUS FLAP  12/18/2013    Performed by Kolton Montgomery M.D. at SURGERY [facility]   • IRRIGATION & DEBRIDEMENT GENERAL  10/23/2013    Performed by Kolton Montgomery M.D. at [facility]   • FISTULA IN ANO REPAIR  9/4/2013    Performed by Kolton Montgomery M.D. at [facility]   • FLAP ROTATION  9/4/2013    Performed by Kolton Montgomery M.D. at [facility]   • RECOVERY  8/26/2013    Performed by Cath-Recovery Surgery at SURGERY SAME DAY [facility]   • BIOPSY GENERAL  7/17/2013    Performed by Kolton Montgomery M.D. at SURGERY [facility]   • PROCTOSCOPY  7/17/2013    Performed by Kolton Montgomery M.D. at SURGERY [facility]   • FISTULA IN ANO REPAIR  2/27/2013    Performed by  "Kolton Montgomery M.D. at SURGERY [facility] ORS   • SIGMOIDOSCOPY  2/27/2013    Performed by Kolton Montgomery M.D. at SURGERY [facility]   • LAPAROSCOPY  10/8/2012    Performed by Kolton Montgomery M.D. at SURGERY [facility]   • ILEO LOOP DIVERSION  10/8/2012    Performed by Kolton Montgomery M.D. at SURGERY [facility] ORS   • COLECTOMY  9/26/2012    Performed by Kolton Montgomery M.D. at SURGERY [facility]   • COLONOSCOPY FLEX W/ENDO US  9/20/2012    Performed by Harshil Bolton M.D. at SURGERY [facility]   • OTHER  2009    left eye torn retina repair   • CATARACT EXTRACTION WITH IOL  2004    bilateral   • ABDOMINAL HYSTERECTOMY TOTAL  1983   • CYST EXCISION  1972    ovarian   • COLON RESECTION     • EYE SURGERY      cataracts   • HYSTERECTOMY LAPAROSCOPY     • PB REMV 2ND CATARACT,CORN-SCLER SECTN     • TONSILLECTOMY       Family History   Problem Relation Age of Onset   • Heart Disease Mother    • Heart Failure Mother    • Hypertension Mother    • Hyperlipidemia Mother    • Cancer Mother    • Cancer Maternal Aunt 60        breast ca   • Lung Disease Brother         COPD/Emphysema/Smoker   • Cancer Brother         prostate cancer   • Alcohol/Drug Brother         Alcohol   • Kidney Disease Father         renal failure     Social History     Social History   • Marital status:      Spouse name: N/A   • Number of children: N/A   • Years of education: N/A     Occupational History   • Not on file.     Social History Main Topics   • Smoking status: Never Smoker   • Smokeless tobacco: Never Used   • Alcohol use No      Comment: rare   • Drug use: No   • Sexual activity: No     Other Topics Concern   • Not on file     Social History Narrative   • No narrative on file     Allergies   Allergen Reactions   • Oxaliplatin Anaphylaxis   • Codeine      \"gets drunk\"   • Pcn [Penicillins] Itching   • Sulfa Drugs Itching   • Tape Rash     PAPER TAPE OK     Outpatient Encounter Prescriptions as of 3/6/2019   Medication " Sig Dispense Refill   • lidocaine-prilocaine (EMLA) 2.5-2.5 % Cream APPLY A THICK FILM OVER THE PORT ACCESS SITE PRIOR TO ACCESS 30 g 3   • ondansetron (ZOFRAN) 4 MG Tab tablet Take 1 Tab by mouth as needed. 30 Tab 3   • rivaroxaban (XARELTO) 10 MG Tab tablet Take 10 mg by mouth.     • Tiotropium Bromide-Olodaterol (STIOLTO RESPIMAT) 2.5-2.5 MCG/ACT Aero Soln Take 2 Puffs by mouth every day. 1 Inhaler 11   • albuterol (PROAIR HFA) 108 (90 Base) MCG/ACT Aero Soln inhalation aerosol Inhale 2 Puffs by mouth every four hours as needed for Shortness of Breath (wheezing). 1 Inhaler 11   • furosemide (LASIX) 40 MG Tab Take 1 Tab by mouth every day. 90 Tab 3   • raNITidine (ZANTAC) 150 MG Tab TAKE 1 TABLET 2 TIMES A DAY 60 Tab 3   • NON SPECIFIED by Intravenous route every 14 days. Chemo 5 FU      • metronidazole (METROCREAM) 0.75 % cream Apply 1 Each to affected area(s) 2 times a day.     • potassium chloride ER (KLOR-CON) 10 MEQ tablet TAKE ONE TABLET EVERY DAY AS NEEDED 30 Tab 6   • loperamide (IMODIUM) 2 MG Cap Take 2 mg by mouth 4 times a day as needed for Diarrhea.     • cyanocobalamin (VITAMIN B-12) 500 MCG Tab Take 500 mcg by mouth every day.     • Multiple Vitamins-Minerals (CENTRUM SILVER PO) Take 1 Tab by mouth every day.     • Cholecalciferol (VITAMIN D3) 400 UNITS CAPS Take 1 Cap by mouth every day.     • isosorbide mononitrate SR (IMDUR) 30 MG TABLET SR 24 HR Take 1 Tab by mouth every morning. (Patient not taking: Reported on 3/5/2019) 30 Tab 3   • loratadine (CLARITIN) 10 MG TABS Take 10 mg by mouth every day. Indications: Hayfever       No facility-administered encounter medications on file as of 3/6/2019.      Review of Systems   Constitutional: Negative.  Negative for chills, fever and malaise/fatigue.   HENT: Negative.  Negative for sore throat.    Eyes: Negative.    Respiratory: Negative.  Negative for cough, hemoptysis, sputum production, shortness of breath, wheezing and stridor.    Cardiovascular:  "Negative.  Negative for chest pain, palpitations, orthopnea, claudication, leg swelling and PND.   Gastrointestinal: Negative.    Genitourinary: Negative.    Musculoskeletal: Negative.    Skin: Negative.    Neurological: Negative.  Negative for dizziness, loss of consciousness and weakness.   Endo/Heme/Allergies: Negative.  Does not bruise/bleed easily.   All other systems reviewed and are negative.       Objective:   /80 (BP Location: Left arm, Patient Position: Sitting, BP Cuff Size: Adult)   Pulse 95   Ht 1.64 m (5' 4.57\")   Wt 77.6 kg (171 lb)   LMP  (LMP Unknown)   SpO2 96%   BMI 28.84 kg/m²     Physical Exam   Constitutional: She appears well-developed and well-nourished. No distress.   HENT:   Head: Normocephalic and atraumatic.   Right Ear: External ear normal.   Left Ear: External ear normal.   Nose: Nose normal.   Mouth/Throat: No oropharyngeal exudate.   Eyes: Pupils are equal, round, and reactive to light. Conjunctivae and EOM are normal. Right eye exhibits no discharge. Left eye exhibits no discharge. No scleral icterus.   Neck: Neck supple. No JVD present.   Cardiovascular: Normal rate, regular rhythm and intact distal pulses.  Exam reveals no gallop and no friction rub.    No murmur heard.  Pulmonary/Chest: Effort normal. No stridor. No respiratory distress. She has no wheezes. She has no rales. She exhibits no tenderness.   Abdominal: Soft. She exhibits no distension. There is no guarding.   Musculoskeletal: Normal range of motion. She exhibits no edema, tenderness or deformity.   Neurological: She is alert. She has normal reflexes. She displays normal reflexes. No cranial nerve deficit. She exhibits normal muscle tone. Coordination normal.   Skin: Skin is warm and dry. No rash noted. She is not diaphoretic. No erythema. No pallor.   Psychiatric: She has a normal mood and affect. Her behavior is normal. Judgment and thought content normal.   Nursing note and vitals " reviewed.      Assessment:     1. Abnormal EKG     2. Chemotherapy-induced neutropenia (HCC)     3. Decreased GFR     4. Dehydration     5. Functional diarrhea     6. Elevated alkaline phosphatase level     7. History of pulmonary embolism     8. Hyperglycemia     9. Hyperlipidemia, unspecified hyperlipidemia type     10. Essential hypertension     11. Localized edema     12. Pulmonary emphysema, unspecified emphysema type (HCC)     13. Renal insufficiency     14. Secondary malignant neoplasm of liver (HCC)     15. Shortness of breath     16. Uncontrolled pain         Medical Decision Making:  Today's Assessment / Status / Plan:     70-year-old female with PE shortness of breath palpitations and some edema.  At this point she continued to take the Lasix as needed.  She is concerned that this is increasing in use but she still barely using it.  I have reassured her that this is okay.  She will stay on the oral and coagulation for her history of PE DVT.  Otherwise I have no changes to make.  I do not feel that we need to be more aggressive in treating her cardiac issues as I think they are mostly benign given the treatment she is going through.  We will see her back in 3 months.    1. Orthostatic tachycardia  - resolved     2. Shortness of breath with exertion and PEs, normal echo  - cont Rivaroxiban 10     3. LE Edema    - lasix 40 PRN  Thank for you allowing me to take part in your patient's care, please call should you have any questions or would like to discuss this patient.

## 2019-03-07 ENCOUNTER — OUTPATIENT INFUSION SERVICES (OUTPATIENT)
Dept: ONCOLOGY | Facility: MEDICAL CENTER | Age: 79
End: 2019-03-07
Attending: COLON & RECTAL SURGERY
Payer: MEDICARE

## 2019-03-07 ENCOUNTER — OFFICE VISIT (OUTPATIENT)
Dept: SURGERY | Facility: MEDICAL CENTER | Age: 79
End: 2019-03-07
Payer: MEDICARE

## 2019-03-07 VITALS
SYSTOLIC BLOOD PRESSURE: 122 MMHG | OXYGEN SATURATION: 96 % | HEIGHT: 64 IN | DIASTOLIC BLOOD PRESSURE: 84 MMHG | TEMPERATURE: 97.5 F | HEART RATE: 105 BPM | WEIGHT: 171.96 LBS | BODY MASS INDEX: 29.36 KG/M2

## 2019-03-07 VITALS
HEIGHT: 65 IN | SYSTOLIC BLOOD PRESSURE: 119 MMHG | BODY MASS INDEX: 28.36 KG/M2 | OXYGEN SATURATION: 99 % | RESPIRATION RATE: 18 BRPM | TEMPERATURE: 97.9 F | HEART RATE: 96 BPM | WEIGHT: 170.19 LBS | DIASTOLIC BLOOD PRESSURE: 59 MMHG

## 2019-03-07 DIAGNOSIS — R94.4 DECREASED GFR: ICD-10-CM

## 2019-03-07 DIAGNOSIS — K76.9 LIVER DISEASE: ICD-10-CM

## 2019-03-07 DIAGNOSIS — C20 MALIGNANT NEOPLASM OF RECTUM (HCC): ICD-10-CM

## 2019-03-07 DIAGNOSIS — C20 RECTAL CANCER (HCC): ICD-10-CM

## 2019-03-07 DIAGNOSIS — C78.7 SECONDARY MALIGNANT NEOPLASM OF LIVER (HCC): ICD-10-CM

## 2019-03-07 PROCEDURE — 700111 HCHG RX REV CODE 636 W/ 250 OVERRIDE (IP): Performed by: INTERNAL MEDICINE

## 2019-03-07 PROCEDURE — 96523 IRRIG DRUG DELIVERY DEVICE: CPT

## 2019-03-07 PROCEDURE — 99214 OFFICE O/P EST MOD 30 MIN: CPT | Performed by: SURGERY

## 2019-03-07 RX ADMIN — HEPARIN 500 UNITS: 100 SYRINGE at 11:05

## 2019-03-07 ASSESSMENT — ENCOUNTER SYMPTOMS
WEAKNESS: 1
SHORTNESS OF BREATH: 1

## 2019-03-07 NOTE — PATIENT INSTRUCTIONS
The patient will follow up with me at her next surveillance imaging which will be in 6 months with a liver CT which we have arranged.

## 2019-03-07 NOTE — PROGRESS NOTES
Here for 5 FU pump disconnect after 46 hrs of continuous infusion. See chemotherapy flow sheet for volume / dose administration. Port flushed per protocol, site d-accessed, needle intact compression dressing applied. Patient discharge home to self care in [facility]. Next appointment confirmed.

## 2019-03-09 DIAGNOSIS — R12 HEARTBURN: ICD-10-CM

## 2019-03-09 DIAGNOSIS — C20 RECTAL CANCER (HCC): ICD-10-CM

## 2019-03-11 RX ORDER — RANITIDINE 150 MG/1
TABLET ORAL
Qty: 60 TAB | Refills: 3 | Status: SHIPPED | OUTPATIENT
Start: 2019-03-11 | End: 2019-05-07 | Stop reason: SDUPTHER

## 2019-03-22 RX ORDER — ONDANSETRON 8 MG/1
8 TABLET, ORALLY DISINTEGRATING ORAL
Status: CANCELLED | OUTPATIENT
Start: 2019-04-09

## 2019-03-22 RX ORDER — DEXAMETHASONE SODIUM PHOSPHATE 4 MG/ML
8 INJECTION, SOLUTION INTRA-ARTICULAR; INTRALESIONAL; INTRAMUSCULAR; INTRAVENOUS; SOFT TISSUE ONCE
Status: CANCELLED | OUTPATIENT
Start: 2019-04-09 | End: 2019-04-09

## 2019-03-22 RX ORDER — DEXTROSE MONOHYDRATE 50 MG/ML
INJECTION, SOLUTION INTRAVENOUS CONTINUOUS
Status: CANCELLED | OUTPATIENT
Start: 2019-04-09

## 2019-03-22 RX ORDER — PROCHLORPERAZINE MALEATE 10 MG
10 TABLET ORAL EVERY 6 HOURS PRN
Status: CANCELLED | OUTPATIENT
Start: 2019-04-09

## 2019-03-22 RX ORDER — LIDOCAINE HYDROCHLORIDE 10 MG/ML
10 INJECTION, SOLUTION INFILTRATION; PERINEURAL ONCE
Status: CANCELLED | OUTPATIENT
Start: 2019-04-09 | End: 2019-04-09

## 2019-03-22 RX ORDER — ONDANSETRON 2 MG/ML
4 INJECTION INTRAMUSCULAR; INTRAVENOUS
Status: CANCELLED | OUTPATIENT
Start: 2019-04-09

## 2019-03-25 NOTE — PROGRESS NOTES
"Pharmacy Chemotherapy Verification    Patient Name: Karlene Walters      Dx: Colon CA        Protocol: 5-FU + Leucovorin        *Dosing Reference*   IV over 2 hours on Day 1, followed by    IVP on Day 1, followed by         -- 10/31/17: delete Leucovorin and 5-FU push d/t neutropenia per Dr. Hardy   Fluorouracil   IV continuous infusion daily on Days 1 and 2 ( IV over 46-48 hours)        -- 20% reduction (1920 mg/m²) to be continued starting C28 per C Alsop APRN   14 day cycles for 12 cycles (adjuvant) or until disease progression or unacceptable toxicity  NCCN Guidelines for Colon Cancer. V.2.2015.  Jay T, et al. Eur J Cancer.1999;35(9):1343-7.      Allergies:  Codeine; Oxaliplatin; Pcn; Sulfa drugs; and Tape       /78   Pulse 97   Temp 36.5 °C (97.7 °F) (Temporal)   Resp 18   Ht 1.651 m (5' 5\")   Wt 77.5 kg (170 lb 13.7 oz)   LMP  (LMP Unknown)   SpO2 99%   BMI 28.43 kg/m²  Body surface area is 1.89 meters squared.     LABS 03/26/19:  ANC ~ 2340 Plt = 291k Hgb = 11.9     3/4/19:   SCr = 1.01 mg/dL CrCl ~ 57 mL/min   LFTs/Tbili = WNL except alk phos 167 K = 3.7    Drug Order   (Drug name, dose, route, IV Fluid & volume, frequency, number of doses) Cycle 79 (C71 in [place])   Previous treatment: C78 = 3/5/19   Medication = Fluorouracil (5-FU)   Base Dose = 1920 mg/m²  Calc Dose:Base Dose x 1.89 m² = 3629 mg  Final Dose = 3630 mg   Route = CIVI  Drug Conc = 50 mg/mL  Fluid & Volume = 72.6 mL (+ 3 mL overfill)  Admin Duration = CIVI over 46 hrs @ 1.6 mL/hr   Via CADD pump for home infusion        <5% difference, OK to treat with final dose     By my signature below, I confirm this process was performed independently with the BSA and all final chemotherapy dosing calculations congruent. I have reviewed the above chemotherapy order and that my calculation of the final dose and BSA (when applicable) corroborate those calculations of the  pharmacist.     Norris Mitchell, PharmD    "

## 2019-03-25 NOTE — PROGRESS NOTES
"Pharmacy Chemotherapy Calculation:    Patient Name: Karlene Walters   Dx: Colon CA        Cycle 79 ([place] cycle 71) delayed   Previous treatment: 03/05/19    Protocol: 5-FU + Leucovorin        *Dosing Reference*   IV over 2 hours on Day 1, followed by    IVP on Day 1, followed by                               -- 10/31/17: delete Leucovorin and 5-FU push d/t neutropenia per Dr. Hayley Bravo   IV continuous infusion daily on Days 1 and 2 ( IV over 46-48 hours)                              -- 20% reduction (1920 mg/m²) to be continued starting C28 per C Alsop APRN   14 day cycles for 12 cycles (adjuvant) or until disease progression or unacceptable toxicity  NCCN Guidelines for Colon Cancer. V.2.2015.  Jay T, et al. Eur J Cancer.1999;35(9):1343-7.      Allergies: Codeine; Oxaliplatin; Pcn; Sulfa drugs; and Tape       /78   Pulse 97   Temp 36.5 °C (97.7 °F) (Temporal)   Resp 18   Ht 1.651 m (5' 5\")   Wt 77.5 kg (170 lb 13.7 oz)   LMP  (LMP Unknown)   SpO2 99%   BMI 28.43 kg/m²  Body surface area is 1.89 meters squared.    LABS 03/26/19:  ANC ~ 2300    Plt = 291k        Hgb = 11.9         Labs 03/04/19:  SCr = 1.01 mg/dL        CrCl ~ 56 mL/min        LFTs/Tbili = WNL except alk phos 167   K = 3.7      Fluorouracil (5-FU) 1920 mg/m² x 1.89 m² = 3628.8 mg              <5% difference, OK to treat with final dose = 3630mg    CIVI over 46 hours Via home infusion pump at 1.6mL/hr      Lalit Andrade, PharmD, BCOP    "

## 2019-03-26 ENCOUNTER — OUTPATIENT INFUSION SERVICES (OUTPATIENT)
Dept: ONCOLOGY | Facility: MEDICAL CENTER | Age: 79
End: 2019-03-26
Attending: COLON & RECTAL SURGERY
Payer: MEDICARE

## 2019-03-26 VITALS
WEIGHT: 170.86 LBS | HEART RATE: 97 BPM | OXYGEN SATURATION: 99 % | SYSTOLIC BLOOD PRESSURE: 137 MMHG | BODY MASS INDEX: 28.47 KG/M2 | DIASTOLIC BLOOD PRESSURE: 78 MMHG | RESPIRATION RATE: 18 BRPM | TEMPERATURE: 97.7 F | HEIGHT: 65 IN

## 2019-03-26 DIAGNOSIS — C20 RECTAL CANCER (HCC): Chronic | ICD-10-CM

## 2019-03-26 DIAGNOSIS — R94.4 DECREASED GFR: ICD-10-CM

## 2019-03-26 LAB
BASOPHILS # BLD AUTO: 0.9 % (ref 0–1.8)
BASOPHILS # BLD: 0.04 K/UL (ref 0–0.12)
EOSINOPHIL # BLD AUTO: 0.22 K/UL (ref 0–0.51)
EOSINOPHIL NFR BLD: 4.8 % (ref 0–6.9)
ERYTHROCYTE [DISTWIDTH] IN BLOOD BY AUTOMATED COUNT: 64.1 FL (ref 35.9–50)
HCT VFR BLD AUTO: 37.8 % (ref 37–47)
HGB BLD-MCNC: 11.9 G/DL (ref 12–16)
IMM GRANULOCYTES # BLD AUTO: 0.02 K/UL (ref 0–0.11)
IMM GRANULOCYTES NFR BLD AUTO: 0.4 % (ref 0–0.9)
LYMPHOCYTES # BLD AUTO: 1.15 K/UL (ref 1–4.8)
LYMPHOCYTES NFR BLD: 25.3 % (ref 22–41)
MCH RBC QN AUTO: 28.3 PG (ref 27–33)
MCHC RBC AUTO-ENTMCNC: 31.5 G/DL (ref 33.6–35)
MCV RBC AUTO: 90 FL (ref 81.4–97.8)
MONOCYTES # BLD AUTO: 0.77 K/UL (ref 0–0.85)
MONOCYTES NFR BLD AUTO: 17 % (ref 0–13.4)
NEUTROPHILS # BLD AUTO: 2.34 K/UL (ref 2–7.15)
NEUTROPHILS NFR BLD: 51.6 % (ref 44–72)
NRBC # BLD AUTO: 0 K/UL
NRBC BLD-RTO: 0 /100 WBC
PLATELET # BLD AUTO: 291 K/UL (ref 164–446)
PMV BLD AUTO: 10 FL (ref 9–12.9)
RBC # BLD AUTO: 4.2 M/UL (ref 4.2–5.4)
WBC # BLD AUTO: 4.5 K/UL (ref 4.8–10.8)

## 2019-03-26 PROCEDURE — 96375 TX/PRO/DX INJ NEW DRUG ADDON: CPT

## 2019-03-26 PROCEDURE — 85025 COMPLETE CBC W/AUTO DIFF WBC: CPT

## 2019-03-26 PROCEDURE — 96374 THER/PROPH/DIAG INJ IV PUSH: CPT | Mod: XU

## 2019-03-26 PROCEDURE — A4212 NON CORING NEEDLE OR STYLET: HCPCS

## 2019-03-26 PROCEDURE — 700111 HCHG RX REV CODE 636 W/ 250 OVERRIDE (IP): Performed by: INTERNAL MEDICINE

## 2019-03-26 PROCEDURE — G0498 CHEMO EXTEND IV INFUS W/PUMP: HCPCS

## 2019-03-26 RX ORDER — DEXAMETHASONE SODIUM PHOSPHATE 4 MG/ML
8 INJECTION, SOLUTION INTRA-ARTICULAR; INTRALESIONAL; INTRAMUSCULAR; INTRAVENOUS; SOFT TISSUE ONCE
Status: COMPLETED | OUTPATIENT
Start: 2019-03-26 | End: 2019-03-26

## 2019-03-26 RX ADMIN — DEXAMETHASONE SODIUM PHOSPHATE 8 MG: 4 INJECTION, SOLUTION INTRA-ARTICULAR; INTRALESIONAL; INTRAMUSCULAR; INTRAVENOUS; SOFT TISSUE at 08:39

## 2019-03-26 RX ADMIN — FLUOROURACIL 3630 MG: 50 INJECTION, SOLUTION INTRAVENOUS at 09:24

## 2019-03-26 NOTE — PROGRESS NOTES
Pt arrived to IS, ambulatory, for 5FU. Pt voices no complaints. Port accessed in sterile manner, positive blood return noted. Labs drawn and reviewed, pt within parameters to treat today. Dexamethasone administered with no s/sx of adverse reaction. 5FU pump connected with no complications. Pt left IS with no s/sx of distress. Follow up appointment confirmed.

## 2019-03-26 NOTE — PROGRESS NOTES
Chemotherapy Verification - PRIMARY RN      Height = 165.1 cm  Weight = 77.5 kg  BSA = 1.89 m2       Medication: Fluorouracil  Dose: 1.920 mg/m2  Calculated Dose: 3,628.8 mg                             (In mg/m2, AUC, mg/kg)       I confirm this process was performed independently with the BSA and all final chemotherapy dosing calculations congruent.  Any discrepancies of 5% or greater have been addressed with the chemotherapy pharmacist. The resolution of the discrepancy has been documented in the EPIC progress notes.

## 2019-03-26 NOTE — PROGRESS NOTES
Chemotherapy Verification - SECONDARY RN       Height = 165.1 cm  Weight = 77.5 kg  BSA = 1.89 m2       Medication: Adrucil  Dose: 1,920 mg/m2  Calculated Dose: 3,628 mg over 46 hours                             (In mg/m2, AUC, mg/kg)       I confirm that this process was performed independently.

## 2019-03-28 ENCOUNTER — OUTPATIENT INFUSION SERVICES (OUTPATIENT)
Dept: ONCOLOGY | Facility: MEDICAL CENTER | Age: 79
End: 2019-03-28
Attending: COLON & RECTAL SURGERY
Payer: MEDICARE

## 2019-03-28 VITALS
SYSTOLIC BLOOD PRESSURE: 115 MMHG | TEMPERATURE: 98.3 F | HEART RATE: 111 BPM | RESPIRATION RATE: 18 BRPM | OXYGEN SATURATION: 96 % | DIASTOLIC BLOOD PRESSURE: 66 MMHG

## 2019-03-28 DIAGNOSIS — C20 RECTAL CANCER (HCC): ICD-10-CM

## 2019-03-28 DIAGNOSIS — R94.4 DECREASED GFR: ICD-10-CM

## 2019-03-28 PROCEDURE — 700111 HCHG RX REV CODE 636 W/ 250 OVERRIDE (IP): Performed by: INTERNAL MEDICINE

## 2019-03-28 PROCEDURE — 96523 IRRIG DRUG DELIVERY DEVICE: CPT

## 2019-03-28 RX ADMIN — HEPARIN 500 UNITS: 100 SYRINGE at 13:48

## 2019-03-29 ENCOUNTER — TELEPHONE (OUTPATIENT)
Dept: HEMATOLOGY ONCOLOGY | Facility: MEDICAL CENTER | Age: 79
End: 2019-03-29

## 2019-03-29 NOTE — TELEPHONE ENCOUNTER
I called pt back to let her know that, per ANDREW Krishnan, she may take OTC cold medicine if she needs to, but absolutely nothing that contains a decongestant.  Other than that, she just needs to be sure to stay hydrated, rest, eat right, and try to stay away from those that are sick.  I advised her to call us if she does develop any symptoms.  She verbalized understanding.

## 2019-03-29 NOTE — TELEPHONE ENCOUNTER
I received a call from pt.  She just recently came back from a cruise.  Everyone who went on the cruise with her is sick now, fever, raspy voice, sore throat.  She had her pump off yesterday  5FU/LV  She denies fever or any other symptoms  She is spitting out a little more mucous today, it is clear.  She states that she has COPD, so she spits out mucous frequently.  She's not taking any vitamin C  She wants to know if there is anything she can take to help avoid getting sick, or help her if she does get sick.  She already asked her PCP, but he told her she needed to ask us.  I told her I would run this by a provider and get back with her.  She verbalized understanding.

## 2019-04-08 NOTE — PROGRESS NOTES
"Pharmacy Chemotherapy Verification    Patient Name: Karlene Walters      Dx: Colon CA        Protocol: 5-FU + Leucovorin        *Dosing Reference*   IV over 2 hours on Day 1, followed by    IVP on Day 1, followed by         -- 10/31/17: delete Leucovorin and 5-FU push d/t neutropenia per Dr. Hardy   Fluorouracil   IV continuous infusion daily on Days 1 and 2 ( IV over 46-48 hours)        -- 20% reduction (1920 mg/m²) to be continued starting C28 per C Alsop APRN   14 day cycles for 12 cycles (adjuvant) or until disease progression or unacceptable toxicity  NCCN Guidelines for Colon Cancer. V.2.2015.  Jay T, et al. Eur J Cancer.1999;35(9):1343-7.      Allergies:  Codeine; Oxaliplatin; Pcn; Sulfa drugs; and Tape       /83   Pulse 98   Temp 36.4 °C (97.6 °F) (Temporal)   Resp 18   Ht 1.651 m (5' 5\")   Wt 76.9 kg (169 lb 8.5 oz)   LMP  (LMP Unknown)   SpO2 98%   BMI 28.21 kg/m²  Body surface area is 1.88 meters squared.     All lab results within treatment parameters.     Drug Order   (Drug name, dose, route, IV Fluid & volume, frequency, number of doses) Cycle 80 (C72 in [place])   Previous treatment: C79 = 3/26/19   Medication = Fluorouracil (5-FU)   Base Dose = 1920 mg/m²  Calc Dose:Base Dose x 1.88m² = 3609mg  Final Dose = 3610mg   Route = CIVI  Drug Conc = 50 mg/mL  Fluid & Volume = 72.2mL (+ 3 mL overfill)  Admin Duration = CIVI over 46 hrs @ 1.6 mL/hr   Via CADD pump for home infusion        <5% difference, OK to treat with final dose     By my signature below, I confirm this process was performed independently with the BSA and all final chemotherapy dosing calculations congruent. I have reviewed the above chemotherapy order and that my calculation of the final dose and BSA (when applicable) corroborate those calculations of the  pharmacist.     Nakia Arroyo, PharmD    "

## 2019-04-09 ENCOUNTER — OUTPATIENT INFUSION SERVICES (OUTPATIENT)
Dept: ONCOLOGY | Facility: MEDICAL CENTER | Age: 79
End: 2019-04-09
Attending: INTERNAL MEDICINE
Payer: MEDICARE

## 2019-04-09 ENCOUNTER — OFFICE VISIT (OUTPATIENT)
Dept: HEMATOLOGY ONCOLOGY | Facility: MEDICAL CENTER | Age: 79
End: 2019-04-09
Payer: MEDICARE

## 2019-04-09 VITALS
BODY MASS INDEX: 27.25 KG/M2 | DIASTOLIC BLOOD PRESSURE: 66 MMHG | WEIGHT: 169.53 LBS | RESPIRATION RATE: 18 BRPM | SYSTOLIC BLOOD PRESSURE: 112 MMHG | HEIGHT: 66 IN | HEART RATE: 98 BPM | TEMPERATURE: 98.3 F | OXYGEN SATURATION: 98 %

## 2019-04-09 VITALS
RESPIRATION RATE: 18 BRPM | OXYGEN SATURATION: 98 % | DIASTOLIC BLOOD PRESSURE: 83 MMHG | BODY MASS INDEX: 28.25 KG/M2 | SYSTOLIC BLOOD PRESSURE: 125 MMHG | WEIGHT: 169.53 LBS | HEIGHT: 65 IN | HEART RATE: 98 BPM | TEMPERATURE: 97.6 F

## 2019-04-09 VITALS
SYSTOLIC BLOOD PRESSURE: 125 MMHG | BODY MASS INDEX: 28.25 KG/M2 | TEMPERATURE: 97.6 F | HEIGHT: 65 IN | HEART RATE: 98 BPM | DIASTOLIC BLOOD PRESSURE: 83 MMHG | OXYGEN SATURATION: 98 % | WEIGHT: 169.53 LBS | RESPIRATION RATE: 18 BRPM

## 2019-04-09 DIAGNOSIS — C18.9 MALIGNANT NEOPLASM OF COLON, UNSPECIFIED PART OF COLON (HCC): ICD-10-CM

## 2019-04-09 DIAGNOSIS — C20 RECTAL CANCER (HCC): Primary | ICD-10-CM

## 2019-04-09 DIAGNOSIS — C20 RECTAL CANCER (HCC): Chronic | ICD-10-CM

## 2019-04-09 DIAGNOSIS — C78.7 SECONDARY MALIGNANT NEOPLASM OF LIVER (HCC): Chronic | ICD-10-CM

## 2019-04-09 DIAGNOSIS — R94.4 DECREASED GFR: ICD-10-CM

## 2019-04-09 LAB
ALBUMIN SERPL BCP-MCNC: 3.5 G/DL (ref 3.2–4.9)
ALBUMIN/GLOB SERPL: 1.2 G/DL
ALP SERPL-CCNC: 167 U/L (ref 30–99)
ALT SERPL-CCNC: 18 U/L (ref 2–50)
ANION GAP SERPL CALC-SCNC: 9 MMOL/L (ref 0–11.9)
AST SERPL-CCNC: 32 U/L (ref 12–45)
BASOPHILS # BLD AUTO: 0.5 % (ref 0–1.8)
BASOPHILS # BLD: 0.04 K/UL (ref 0–0.12)
BILIRUB SERPL-MCNC: 0.6 MG/DL (ref 0.1–1.5)
BUN SERPL-MCNC: 16 MG/DL (ref 8–22)
CALCIUM SERPL-MCNC: 8.8 MG/DL (ref 8.5–10.5)
CEA SERPL-MCNC: 1.3 NG/ML (ref 0–3)
CHLORIDE SERPL-SCNC: 106 MMOL/L (ref 96–112)
CO2 SERPL-SCNC: 24 MMOL/L (ref 20–33)
CREAT SERPL-MCNC: 1.02 MG/DL (ref 0.5–1.4)
EOSINOPHIL # BLD AUTO: 0.17 K/UL (ref 0–0.51)
EOSINOPHIL NFR BLD: 2.3 % (ref 0–6.9)
ERYTHROCYTE [DISTWIDTH] IN BLOOD BY AUTOMATED COUNT: 62.2 FL (ref 35.9–50)
GLOBULIN SER CALC-MCNC: 2.9 G/DL (ref 1.9–3.5)
GLUCOSE SERPL-MCNC: 101 MG/DL (ref 65–99)
HCT VFR BLD AUTO: 41.2 % (ref 37–47)
HGB BLD-MCNC: 12.4 G/DL (ref 12–16)
IMM GRANULOCYTES # BLD AUTO: 0.03 K/UL (ref 0–0.11)
IMM GRANULOCYTES NFR BLD AUTO: 0.4 % (ref 0–0.9)
LYMPHOCYTES # BLD AUTO: 1.11 K/UL (ref 1–4.8)
LYMPHOCYTES NFR BLD: 14.9 % (ref 22–41)
MCH RBC QN AUTO: 27.3 PG (ref 27–33)
MCHC RBC AUTO-ENTMCNC: 30.1 G/DL (ref 33.6–35)
MCV RBC AUTO: 90.7 FL (ref 81.4–97.8)
MONOCYTES # BLD AUTO: 0.78 K/UL (ref 0–0.85)
MONOCYTES NFR BLD AUTO: 10.5 % (ref 0–13.4)
NEUTROPHILS # BLD AUTO: 5.3 K/UL (ref 2–7.15)
NEUTROPHILS NFR BLD: 71.4 % (ref 44–72)
NRBC # BLD AUTO: 0 K/UL
NRBC BLD-RTO: 0 /100 WBC
PLATELET # BLD AUTO: 248 K/UL (ref 164–446)
PMV BLD AUTO: 9.8 FL (ref 9–12.9)
POTASSIUM SERPL-SCNC: 4.2 MMOL/L (ref 3.6–5.5)
PROT SERPL-MCNC: 6.4 G/DL (ref 6–8.2)
RBC # BLD AUTO: 4.54 M/UL (ref 4.2–5.4)
SODIUM SERPL-SCNC: 139 MMOL/L (ref 135–145)
WBC # BLD AUTO: 7.4 K/UL (ref 4.8–10.8)

## 2019-04-09 PROCEDURE — 82378 CARCINOEMBRYONIC ANTIGEN: CPT

## 2019-04-09 PROCEDURE — G0498 CHEMO EXTEND IV INFUS W/PUMP: HCPCS

## 2019-04-09 PROCEDURE — 99213 OFFICE O/P EST LOW 20 MIN: CPT | Performed by: NURSE PRACTITIONER

## 2019-04-09 PROCEDURE — 700111 HCHG RX REV CODE 636 W/ 250 OVERRIDE (IP)

## 2019-04-09 PROCEDURE — 80053 COMPREHEN METABOLIC PANEL: CPT

## 2019-04-09 PROCEDURE — 96372 THER/PROPH/DIAG INJ SC/IM: CPT | Mod: XU

## 2019-04-09 PROCEDURE — 96375 TX/PRO/DX INJ NEW DRUG ADDON: CPT

## 2019-04-09 PROCEDURE — 85025 COMPLETE CBC W/AUTO DIFF WBC: CPT

## 2019-04-09 PROCEDURE — A4212 NON CORING NEEDLE OR STYLET: HCPCS

## 2019-04-09 PROCEDURE — 700111 HCHG RX REV CODE 636 W/ 250 OVERRIDE (IP): Performed by: NURSE PRACTITIONER

## 2019-04-09 RX ORDER — DEXAMETHASONE SODIUM PHOSPHATE 4 MG/ML
8 INJECTION, SOLUTION INTRA-ARTICULAR; INTRALESIONAL; INTRAMUSCULAR; INTRAVENOUS; SOFT TISSUE ONCE
Status: COMPLETED | OUTPATIENT
Start: 2019-04-09 | End: 2019-04-09

## 2019-04-09 RX ADMIN — FLUOROURACIL 3610 MG: 50 INJECTION, SOLUTION INTRAVENOUS at 09:33

## 2019-04-09 RX ADMIN — DEXAMETHASONE SODIUM PHOSPHATE 8 MG: 4 INJECTION, SOLUTION INTRA-ARTICULAR; INTRALESIONAL; INTRAMUSCULAR; INTRAVENOUS; SOFT TISSUE at 08:48

## 2019-04-09 RX ADMIN — HEPARIN 500 UNITS: 100 SYRINGE at 07:14

## 2019-04-09 ASSESSMENT — ENCOUNTER SYMPTOMS
VOMITING: 0
FEVER: 0
ABDOMINAL PAIN: 0
HEADACHES: 0
WEIGHT LOSS: 0
CHILLS: 0
PALPITATIONS: 1
MYALGIAS: 0
DIARRHEA: 0
DIZZINESS: 0
CONSTIPATION: 0
SHORTNESS OF BREATH: 1
COUGH: 0
NAUSEA: 1

## 2019-04-09 ASSESSMENT — PAIN SCALES - GENERAL: PAINLEVEL: NO PAIN

## 2019-04-09 NOTE — PROGRESS NOTES
Pt to IS for pre-treatment labs. Voiced no complaints. Port accessed without difficulty.  Flushed and paolo easily.  CBC, CMP and CEA drawn and sent to lab. Port flushed with NS & Heparin per policy.  Pt left IS to go to MD office. Pt left IS with no s/s distress.  Will return later in am for treatment.

## 2019-04-09 NOTE — PROGRESS NOTES
"Pharmacy Chemotherapy Calculation:    Patient Name: Karlene Walters   Dx: Colon CA        Cycle 80 ([place] cycle 72)    Previous treatment: 03/26/19    Protocol: 5-FU + Leucovorin      IV over 2 hours on Day 1, followed by    IVP on Day 1, followed by                               -- 10/31/17: delete Leucovorin and 5-FU push d/t neutropenia per Dr. Hardy   Fluorouracil  continuous infusion over 46-48 hours                              -- 20% reduction (1920 mg/m²) to be continued starting C28 per C Alsop APRN   14 day cycles for 12 cycles (adjuvant) or until disease progression or unacceptable toxicity  NCCN Guidelines for Colon Cancer. V.2.2015.  Jay T, et al. Eur J Cancer.1999;35(9):1343-7.      Allergies: Codeine; Oxaliplatin; Pcn; Sulfa drugs; and Tape       /83   Pulse 98   Temp 36.4 °C (97.6 °F) (Temporal)   Resp 18   Ht 1.651 m (5' 5\")   Wt 76.9 kg (169 lb 8.5 oz)   LMP  (LMP Unknown)   SpO2 98%   BMI 28.21 kg/m²  Body surface area is 1.88 meters squared.    ANC~ 5300 Plt = 248k   Hgb = 12.4     SCr = 1.02mg/dL CrCl ~ 55mL/min   LFT's = 32/18/167 TBili = 0.6        Fluorouracil (5-FU) 1920 mg/m² x 1.88 m² = 3609.6 mg              <5% difference, OK to treat with final dose = 3610 mg CIVI over 46 hrs        Tiffanie Olivier, PharmD, BCOP    "

## 2019-04-09 NOTE — PROGRESS NOTES
Chemotherapy Verification - PRIMARY RN      Height = 165.1cm  Weight = 76.9kg  BSA = 1.88m2       Medication: 5FU  Dose: 1920mg/m2  Calculated Dose: 3606mg                             (In mg/m2, AUC, mg/kg)       I confirm this process was performed independently with the BSA and all final chemotherapy dosing calculations congruent.  Any discrepancies of 5% or greater have been addressed with the chemotherapy pharmacist. The resolution of the discrepancy has been documented in the EPIC progress notes.

## 2019-04-09 NOTE — PROGRESS NOTES
"Subjective:      Karlene Walters is a 78 y.o. female who presents with Follow-Up (prechemo (Dr. Hardy)) metastatic rectal cancer.         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  Patient is unaccompanied for today's visit.  Patient is scheduled to receive cycle #72 of single agent 5-FU.    Patient is doing very well and has been tolerating treatment well.  She is quite stable.  She does still have fatigue present but states it is a little bit better.  She does still have shortness of breath with exertion but denies a cough.  She has been followed closely and worked up considerably for her shortness of breath.  She is due to follow-up with pulmonary again in July.  She also does have heart palpitations which is stable and not worsening.  She also sees cardiology regularly as well.  She does have Lasix with potassium at home to take as needed for edema.  She states that she takes it a couple times per week.  She does note some nausea every once in a while which is resolved with Zofran.  She does get cramps in her abdomen area every once in a while when taking Lasix.  She denies any diarrhea or constipation she does have normal output via her ostomy.    Patient recently went on her cruise to [place] back in March.  She had her chemotherapy was delayed by 1 week.  She is doing well being back home.    Allergies   Allergen Reactions   • Oxaliplatin Anaphylaxis   • Codeine      \"gets drunk\"   • Pcn [Penicillins] Itching   • Sulfa Drugs Itching   • Tape Rash     PAPER TAPE OK     Current Outpatient Prescriptions on File Prior to Visit   Medication Sig Dispense Refill   • raNITidine (ZANTAC) 150 MG Tab TAKE ONE TABLET BY MOUTH TWO TIMES A DAY 60 Tab 3   • rivaroxaban (XARELTO) 10 MG Tab tablet Take 10 mg by mouth.     • Tiotropium Bromide-Olodaterol (STIOLTO RESPIMAT) 2.5-2.5 MCG/ACT Aero Soln Take 2 Puffs by mouth every day. 1 Inhaler 11   • NON " SPECIFIED by Intravenous route every 14 days. Chemo 5 FU      • metronidazole (METROCREAM) 0.75 % cream Apply 1 Each to affected area(s) 2 times a day.     • cyanocobalamin (VITAMIN B-12) 500 MCG Tab Take 500 mcg by mouth every day.     • Multiple Vitamins-Minerals (CENTRUM SILVER PO) Take 1 Tab by mouth every day.     • Cholecalciferol (VITAMIN D3) 400 UNITS CAPS Take 1 Cap by mouth every day.     • loratadine (CLARITIN) 10 MG TABS Take 10 mg by mouth every day. Indications: Hayfever     • lidocaine-prilocaine (EMLA) 2.5-2.5 % Cream APPLY A THICK FILM OVER THE PORT ACCESS SITE PRIOR TO ACCESS 30 g 3   • ondansetron (ZOFRAN) 4 MG Tab tablet Take 1 Tab by mouth as needed. 30 Tab 3   • albuterol (PROAIR HFA) 108 (90 Base) MCG/ACT Aero Soln inhalation aerosol Inhale 2 Puffs by mouth every four hours as needed for Shortness of Breath (wheezing). 1 Inhaler 11   • furosemide (LASIX) 40 MG Tab Take 1 Tab by mouth every day. 90 Tab 3   • potassium chloride ER (KLOR-CON) 10 MEQ tablet TAKE ONE TABLET EVERY DAY AS NEEDED 30 Tab 6   • loperamide (IMODIUM) 2 MG Cap Take 2 mg by mouth 4 times a day as needed for Diarrhea.       No current facility-administered medications on file prior to visit.          Review of Systems   Constitutional: Positive for malaise/fatigue (still present but a little better). Negative for chills, fever and weight loss.   Respiratory: Positive for shortness of breath (with exertion). Negative for cough.         Clears throat   Cardiovascular: Positive for palpitations (Sees Cardiology). Negative for chest pain.   Gastrointestinal: Positive for nausea. Negative for abdominal pain (she does gets cramps every once in a while after taking Lasix), constipation, diarrhea and vomiting.        Uses Zofran as needed - needed it today   Genitourinary: Negative for dysuria.   Musculoskeletal: Negative for myalgias.   Skin: Negative for itching and rash.   Neurological: Negative for dizziness and headaches.  "         Objective:     /66 (BP Location: Right arm, Patient Position: Sitting, BP Cuff Size: Adult)   Pulse 98   Temp 36.8 °C (98.3 °F) (Temporal)   Resp 18   Ht 1.664 m (5' 5.5\")   Wt 76.9 kg (169 lb 8.5 oz)   LMP  (LMP Unknown)   SpO2 98%   BMI 27.78 kg/m²      Physical Exam   Constitutional: She is oriented to person, place, and time. She appears well-developed and well-nourished. No distress.   HENT:   Head: Normocephalic and atraumatic.   Mouth/Throat: Oropharynx is clear and moist. No oropharyngeal exudate.   Cardiovascular: Normal rate, regular rhythm and normal heart sounds.  Exam reveals no gallop and no friction rub.    No murmur heard.  Pulmonary/Chest: Effort normal and breath sounds normal. No respiratory distress. She has no wheezes.   Abdominal: Soft. Bowel sounds are normal. She exhibits no distension. There is no tenderness.   Musculoskeletal: Normal range of motion. She exhibits no edema or tenderness.   Neurological: She is alert and oriented to person, place, and time.   Skin: Skin is warm and dry. No rash noted. She is not diaphoretic. No erythema. No pallor.   Psychiatric: She has a normal mood and affect. Her behavior is normal.   Vitals reviewed.           Assessment/Plan:     1. Rectal cancer (HCC)     2. Secondary malignant neoplasm of liver (HCC)       Plan  1.  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  She is currently on single agent 5-FU and tolerating it well.  She is scheduled for cycle #72 today.  If labs meet parameters patient is okay to proceed with treatment as planned.    2.  Patient a few months back had developed some progression which was limited to the liver.  She underwent microwave ablation with normalization of her CEA levels.  PET scan did not reveal any other sites of disease.  Her CEA has overall improved.  According to Dr. Hardy patient is experiencing recently good tolerance with maintenance 5-FU and is recommended to continue on 5-FU as " planned.  Patient is followed by Dr. West, surgeon, as well and does appear that he has scheduled a CT abdomen in the future.  Patient feels better and wanting to continue with 5-FU maintenance.    3.  Patient to follow-up in the clinic in 4 weeks with continued treatment every 2 weeks as planned.  She is to call office sooner if needed.

## 2019-04-09 NOTE — PROGRESS NOTES
Pt to infusion services ambulatory for 5FU.  Pt voices no complaints.  Port (accessed earlier this morning) flushed per policy.  Positive blood return noted.  Labs reviewed, pt within parameters to treat.  Premedication given, 5FU pump connected with no complications.  Pt left infusion with no s/sx distress.  Follow up appointment confirmed.

## 2019-04-09 NOTE — PROGRESS NOTES
Chemotherapy Verification - PRIMARY RN      Height = 165.1 cm  Weight = 76.9 kg  BSA = 1.88 m2       Medication: Fluorouracil  Dose: 1,920 mg/m2  Calculated Dose: 3,609.6 mg                             (In mg/m2, AUC, mg/kg)       I confirm this process was performed independently with the BSA and all final chemotherapy dosing calculations congruent.  Any discrepancies of 5% or greater have been addressed with the chemotherapy pharmacist. The resolution of the discrepancy has been documented in the EPIC progress notes.

## 2019-04-09 NOTE — PROGRESS NOTES
Chemotherapy Verification - SECONDARY RN       Height = 165.1 cm  Weight = 76.9 kg  BSA = 1.88 m2      Medication: Adrucil  Dose: 1,920 mg/m2 over 46 hours  Calculated Dose: 3,609.6 mg                            (In mg/m2, AUC, mg/kg)         I confirm that this process was performed independently.

## 2019-04-11 ENCOUNTER — OUTPATIENT INFUSION SERVICES (OUTPATIENT)
Dept: ONCOLOGY | Facility: MEDICAL CENTER | Age: 79
End: 2019-04-11
Attending: INTERNAL MEDICINE
Payer: MEDICARE

## 2019-04-11 VITALS
HEART RATE: 99 BPM | SYSTOLIC BLOOD PRESSURE: 128 MMHG | RESPIRATION RATE: 18 BRPM | TEMPERATURE: 97.7 F | WEIGHT: 171.3 LBS | DIASTOLIC BLOOD PRESSURE: 71 MMHG | BODY MASS INDEX: 28.54 KG/M2 | OXYGEN SATURATION: 98 % | HEIGHT: 65 IN

## 2019-04-11 DIAGNOSIS — C20 RECTAL CANCER (HCC): ICD-10-CM

## 2019-04-11 DIAGNOSIS — R94.4 DECREASED GFR: ICD-10-CM

## 2019-04-11 PROCEDURE — 700111 HCHG RX REV CODE 636 W/ 250 OVERRIDE (IP): Performed by: NURSE PRACTITIONER

## 2019-04-11 PROCEDURE — 96523 IRRIG DRUG DELIVERY DEVICE: CPT

## 2019-04-11 RX ADMIN — HEPARIN 500 UNITS: 100 SYRINGE at 13:47

## 2019-04-11 NOTE — PROGRESS NOTES
Patient arrived to [facility] for C72D3 5FU CADD pump de-access.  Pump stopped with 0ml remaining in reservoir, 73.4 mLs administered.  Disconnected pump, Port flushed per protocol with brisk blood return noted, heparinized, de-accessed and gauze dressing applied.  Pump cleaned and placed in pharmacy drawer.  Confirmed pt's next appt. Pt discharged home in [facility].

## 2019-04-22 RX ORDER — ONDANSETRON 2 MG/ML
4 INJECTION INTRAMUSCULAR; INTRAVENOUS
Status: CANCELLED | OUTPATIENT
Start: 2019-04-23

## 2019-04-22 RX ORDER — ONDANSETRON 8 MG/1
8 TABLET, ORALLY DISINTEGRATING ORAL
Status: CANCELLED | OUTPATIENT
Start: 2019-04-23

## 2019-04-22 RX ORDER — PROCHLORPERAZINE MALEATE 10 MG
10 TABLET ORAL EVERY 6 HOURS PRN
Status: CANCELLED | OUTPATIENT
Start: 2019-04-23

## 2019-04-22 RX ORDER — LIDOCAINE HYDROCHLORIDE 10 MG/ML
10 INJECTION, SOLUTION INFILTRATION; PERINEURAL ONCE
Status: CANCELLED | OUTPATIENT
Start: 2019-04-23 | End: 2019-04-23

## 2019-04-22 RX ORDER — DEXAMETHASONE SODIUM PHOSPHATE 4 MG/ML
8 INJECTION, SOLUTION INTRA-ARTICULAR; INTRALESIONAL; INTRAMUSCULAR; INTRAVENOUS; SOFT TISSUE ONCE
Status: CANCELLED | OUTPATIENT
Start: 2019-04-23 | End: 2019-04-23

## 2019-04-22 RX ORDER — DEXTROSE MONOHYDRATE 50 MG/ML
INJECTION, SOLUTION INTRAVENOUS CONTINUOUS
Status: CANCELLED | OUTPATIENT
Start: 2019-04-23

## 2019-04-22 NOTE — PROGRESS NOTES
"Pharmacy Chemotherapy Verification    Patient Name: Karlene Walters      Dx: Colon CA        Protocol: 5-FU + Leucovorin        *Dosing Reference*   IV over 2 hours on Day 1, followed by    IVP on Day 1, followed by         -- 10/31/17: delete Leucovorin and 5-FU push d/t neutropenia per Dr. Hardy   Fluorouracil   IV continuous infusion daily on Days 1 and 2 ( IV over 46-48 hours)        -- 20% reduction (1920 mg/m²) to be continued starting C28 per C Alsop APRN   14 day cycles for 12 cycles (adjuvant) or until disease progression or unacceptable toxicity  NCCN Guidelines for Colon Cancer. V.2.2015.  Jay T, et al. Eur J Cancer.1999;35(9):1343-7.      Allergies:  Codeine; Oxaliplatin; Pcn; Sulfa drugs; and Tape       /65   Pulse (!) 102   Temp 36.6 °C (97.8 °F) (Temporal)   Resp 18   Ht 1.651 m (5' 5\")   Wt 77.2 kg (170 lb 3.1 oz)   LMP  (LMP Unknown)   SpO2 99%   BMI 28.32 kg/m²  Body surface area is 1.88 meters squared.     LABS:  WBC   Date Value Ref Range Status   04/23/2019 6.8 4.8 - 10.8 K/uL Final     Hemoglobin   Date Value Ref Range Status   04/23/2019 12.6 12.0 - 16.0 g/dL Final     Hematocrit   Date Value Ref Range Status   04/23/2019 41.1 37.0 - 47.0 % Final     Platelet Count   Date Value Ref Range Status   04/23/2019 248 164 - 446 K/uL Final     Neutrophils (Absolute)   Date Value Ref Range Status   04/23/2019 4.68 2.00 - 7.15 K/uL Final     Comment:     Includes immature neutrophils, if present.     Sodium   Date Value Ref Range Status   04/09/2019 139 135 - 145 mmol/L Final     Potassium   Date Value Ref Range Status   04/09/2019 4.2 3.6 - 5.5 mmol/L Final     Bun   Date Value Ref Range Status   04/09/2019 16 8 - 22 mg/dL Final     Creatinine   Date Value Ref Range Status   04/09/2019 1.02 0.50 - 1.40 mg/dL Final     Calcium   Date Value Ref Range Status   04/09/2019 8.8 8.5 - 10.5 mg/dL Final     Magnesium   Date Value Ref Range Status   03/01/2016 2.0 1.5 - 2.5 mg/dL Final "     Phosphorus   Date Value Ref Range Status   10/11/2012 3.6 2.5 - 4.5 mg/dL Final     Glucose   Date Value Ref Range Status   04/09/2019 101 (H) 65 - 99 mg/dL Final     AST(SGOT)   Date Value Ref Range Status   04/09/2019 32 12 - 45 U/L Final     ALT(SGPT)   Date Value Ref Range Status   04/09/2019 18 2 - 50 U/L Final     Alkaline Phosphatase   Date Value Ref Range Status   04/09/2019 167 (H) 30 - 99 U/L Final     Total Bilirubin   Date Value Ref Range Status   04/09/2019 0.6 0.1 - 1.5 mg/dL Final     CrCl: 54.4 mls/min .    Impression:  All labs w/in treatment parameters    Drug Order   (Drug name, dose, route, IV Fluid & volume, frequency, number of doses) Cycle 81 (C73 in [place])   Previous treatment: C80 = 4/9/2019   Medication = Fluorouracil (5-FU)   Base Dose = 1920 mg/m²  Calc Dose:Base Dose x Body surface area is 1.88 meters squared.m² = 3609.6 mg  Final Dose = 3610 mg   Route = CIVI  Drug Conc = 50 mg/mL  Fluid & Volume = 72.2 mL (+ 3 mL overfill)  Admin Duration = CIVI over 46 hrs @ 1.6 mL/hr   Via CADD pump for home infusion        <5% difference, OK to treat with final dose     By my signature below, I confirm this process was performed independently with the BSA and all final chemotherapy dosing calculations congruent. I have reviewed the above chemotherapy order and that my calculation of the final dose and BSA (when applicable) corroborate those calculations of the  pharmacist.     ANA Villatoro, PharmD

## 2019-04-22 NOTE — PROGRESS NOTES
"Pharmacy Chemotherapy Calculation:    Patient Name: Karlene Walters   Dx: Colon CA        Cycle 81 ([place] cycle 73)    Previous treatment: 04/11/19    Protocol: 5-FU + Leucovorin      IV over 2 hours on Day 1, followed by    IVP on Day 1, followed by                               -- 10/31/17: delete Leucovorin and 5-FU push d/t neutropenia per Dr. Hardy   Fluorouracil  continuous infusion over 46-48 hours                              -- 20% reduction (1920 mg/m²) to be continued starting C28 per C Alsop APRN   14 day cycles for 12 cycles (adjuvant) or until disease progression or unacceptable toxicity  NCCN Guidelines for Colon Cancer. V.2.2015.  Jay T, et al. Eur J Cancer.1999;35(9):1343-7.      Allergies: Codeine; Oxaliplatin; Pcn; Sulfa drugs; and Tape       /65   Pulse (!) 102   Temp 36.6 °C (97.8 °F) (Temporal)   Resp 18   Ht 1.651 m (5' 5\")   Wt 77.2 kg (170 lb 3.1 oz)   LMP  (LMP Unknown)   SpO2 99%   BMI 28.32 kg/m²  Body surface area is 1.88 meters squared.    Labs 04/23/19:  ANC~ 4700 Plt = 248k   Hgb = 12.6       Labs 04/09/19:  SCr = 1.02mg/dL CrCl ~ 55mL/min  LFT's = 32/18/167 TBili = 0.6        Fluorouracil (5-FU) 1920 mg/m² x 1.88 m² = 3609.6 mg              <5% difference, OK to treat with final dose = 3610 mg CIVI over 46 hrs        Lalit Andrade, PharmD, BCOP      "

## 2019-04-23 ENCOUNTER — OUTPATIENT INFUSION SERVICES (OUTPATIENT)
Dept: ONCOLOGY | Facility: MEDICAL CENTER | Age: 79
End: 2019-04-23
Attending: COLON & RECTAL SURGERY
Payer: MEDICARE

## 2019-04-23 VITALS
SYSTOLIC BLOOD PRESSURE: 140 MMHG | TEMPERATURE: 97.8 F | HEART RATE: 102 BPM | RESPIRATION RATE: 18 BRPM | WEIGHT: 170.19 LBS | DIASTOLIC BLOOD PRESSURE: 65 MMHG | HEIGHT: 65 IN | OXYGEN SATURATION: 99 % | BODY MASS INDEX: 28.36 KG/M2

## 2019-04-23 DIAGNOSIS — R94.4 DECREASED GFR: ICD-10-CM

## 2019-04-23 DIAGNOSIS — C20 RECTAL CANCER (HCC): Chronic | ICD-10-CM

## 2019-04-23 LAB
BASOPHILS # BLD AUTO: 0.9 % (ref 0–1.8)
BASOPHILS # BLD: 0.06 K/UL (ref 0–0.12)
EOSINOPHIL # BLD AUTO: 0.2 K/UL (ref 0–0.51)
EOSINOPHIL NFR BLD: 2.9 % (ref 0–6.9)
ERYTHROCYTE [DISTWIDTH] IN BLOOD BY AUTOMATED COUNT: 60.1 FL (ref 35.9–50)
HCT VFR BLD AUTO: 41.1 % (ref 37–47)
HGB BLD-MCNC: 12.6 G/DL (ref 12–16)
IMM GRANULOCYTES # BLD AUTO: 0.03 K/UL (ref 0–0.11)
IMM GRANULOCYTES NFR BLD AUTO: 0.4 % (ref 0–0.9)
LYMPHOCYTES # BLD AUTO: 1.12 K/UL (ref 1–4.8)
LYMPHOCYTES NFR BLD: 16.5 % (ref 22–41)
MCH RBC QN AUTO: 28.1 PG (ref 27–33)
MCHC RBC AUTO-ENTMCNC: 30.7 G/DL (ref 33.6–35)
MCV RBC AUTO: 91.5 FL (ref 81.4–97.8)
MONOCYTES # BLD AUTO: 0.69 K/UL (ref 0–0.85)
MONOCYTES NFR BLD AUTO: 10.2 % (ref 0–13.4)
NEUTROPHILS # BLD AUTO: 4.68 K/UL (ref 2–7.15)
NEUTROPHILS NFR BLD: 69.1 % (ref 44–72)
NRBC # BLD AUTO: 0 K/UL
NRBC BLD-RTO: 0 /100 WBC
PLATELET # BLD AUTO: 248 K/UL (ref 164–446)
PMV BLD AUTO: 10 FL (ref 9–12.9)
RBC # BLD AUTO: 4.49 M/UL (ref 4.2–5.4)
WBC # BLD AUTO: 6.8 K/UL (ref 4.8–10.8)

## 2019-04-23 PROCEDURE — 96374 THER/PROPH/DIAG INJ IV PUSH: CPT | Mod: XU

## 2019-04-23 PROCEDURE — 700111 HCHG RX REV CODE 636 W/ 250 OVERRIDE (IP): Performed by: NURSE PRACTITIONER

## 2019-04-23 PROCEDURE — G0498 CHEMO EXTEND IV INFUS W/PUMP: HCPCS

## 2019-04-23 PROCEDURE — 700111 HCHG RX REV CODE 636 W/ 250 OVERRIDE (IP)

## 2019-04-23 PROCEDURE — 85025 COMPLETE CBC W/AUTO DIFF WBC: CPT

## 2019-04-23 PROCEDURE — A4212 NON CORING NEEDLE OR STYLET: HCPCS

## 2019-04-23 PROCEDURE — 96375 TX/PRO/DX INJ NEW DRUG ADDON: CPT

## 2019-04-23 RX ORDER — DEXAMETHASONE SODIUM PHOSPHATE 4 MG/ML
8 INJECTION, SOLUTION INTRA-ARTICULAR; INTRALESIONAL; INTRAMUSCULAR; INTRAVENOUS; SOFT TISSUE ONCE
Status: COMPLETED | OUTPATIENT
Start: 2019-04-23 | End: 2019-04-23

## 2019-04-23 RX ADMIN — DEXAMETHASONE SODIUM PHOSPHATE 8 MG: 4 INJECTION, SOLUTION INTRA-ARTICULAR; INTRALESIONAL; INTRAMUSCULAR; INTRAVENOUS; SOFT TISSUE at 08:53

## 2019-04-23 RX ADMIN — FLUOROURACIL 3610 MG: 50 INJECTION, SOLUTION INTRAVENOUS at 09:49

## 2019-04-23 NOTE — PROGRESS NOTES
Chemotherapy Verification - PRIMARY RN      Height = 165.1 cm  Weight = 77.2 kg  BSA = 1.88 m2       Medication: Fluorouracil  Dose: 1,920 mg/m2  Calculated Dose: 3,609.6 mg                             (In mg/m2, AUC, mg/kg)       I confirm this process was performed independently with the BSA and all final chemotherapy dosing calculations congruent.  Any discrepancies of 5% or greater have been addressed with the chemotherapy pharmacist. The resolution of the discrepancy has been documented in the EPIC progress notes.

## 2019-04-23 NOTE — PROGRESS NOTES
Chemotherapy Verification - SECONDARY RN       Height = 65 in  Weight = 77.2 kg  BSA = 1.88 m2       Medication: 5 FU pump  Dose: 1920 mg/m2  Calculated Dose: 3609 mg over 46 hours                            (In mg/m2, AUC, mg/kg)     I confirm that this process was performed independently.

## 2019-04-25 ENCOUNTER — OUTPATIENT INFUSION SERVICES (OUTPATIENT)
Dept: ONCOLOGY | Facility: MEDICAL CENTER | Age: 79
End: 2019-04-25
Attending: INTERNAL MEDICINE
Payer: MEDICARE

## 2019-04-25 VITALS
SYSTOLIC BLOOD PRESSURE: 103 MMHG | RESPIRATION RATE: 18 BRPM | HEART RATE: 102 BPM | OXYGEN SATURATION: 100 % | WEIGHT: 168.65 LBS | BODY MASS INDEX: 28.1 KG/M2 | DIASTOLIC BLOOD PRESSURE: 51 MMHG | HEIGHT: 65 IN | TEMPERATURE: 97.9 F

## 2019-04-25 DIAGNOSIS — C20 RECTAL CANCER (HCC): ICD-10-CM

## 2019-04-25 DIAGNOSIS — R94.4 DECREASED GFR: ICD-10-CM

## 2019-04-25 PROCEDURE — 700111 HCHG RX REV CODE 636 W/ 250 OVERRIDE (IP)

## 2019-04-25 PROCEDURE — 96523 IRRIG DRUG DELIVERY DEVICE: CPT

## 2019-04-25 RX ADMIN — Medication 500 UNITS: at 10:40

## 2019-04-25 NOTE — PROGRESS NOTES
Patient presents for home infusion pump disconnect. Home pump disconnected, reads volume 0.0. Port flushed per protocol and de-accessed, sterile gauze to site. Patient scheduled for next appointment and released in no acute distress.

## 2019-04-26 ENCOUNTER — TELEPHONE (OUTPATIENT)
Dept: HEMATOLOGY ONCOLOGY | Facility: MEDICAL CENTER | Age: 79
End: 2019-04-26

## 2019-04-26 NOTE — TELEPHONE ENCOUNTER
Called patient to inform her of her next appointment with Dr. Hardy on 5/21/19. Pt verbalized understanding of appointment

## 2019-05-02 RX ORDER — ONDANSETRON 8 MG/1
8 TABLET, ORALLY DISINTEGRATING ORAL
Status: CANCELLED | OUTPATIENT
Start: 2019-05-07

## 2019-05-02 RX ORDER — ONDANSETRON 2 MG/ML
4 INJECTION INTRAMUSCULAR; INTRAVENOUS
Status: CANCELLED | OUTPATIENT
Start: 2019-05-07

## 2019-05-02 RX ORDER — PROCHLORPERAZINE MALEATE 10 MG
10 TABLET ORAL EVERY 6 HOURS PRN
Status: CANCELLED | OUTPATIENT
Start: 2019-05-07

## 2019-05-02 RX ORDER — DEXAMETHASONE SODIUM PHOSPHATE 4 MG/ML
8 INJECTION, SOLUTION INTRA-ARTICULAR; INTRALESIONAL; INTRAMUSCULAR; INTRAVENOUS; SOFT TISSUE ONCE
Status: CANCELLED | OUTPATIENT
Start: 2019-05-07 | End: 2019-05-07

## 2019-05-02 RX ORDER — DEXTROSE MONOHYDRATE 50 MG/ML
INJECTION, SOLUTION INTRAVENOUS CONTINUOUS
Status: CANCELLED | OUTPATIENT
Start: 2019-05-07

## 2019-05-02 RX ORDER — LIDOCAINE HYDROCHLORIDE 10 MG/ML
10 INJECTION, SOLUTION INFILTRATION; PERINEURAL ONCE
Status: CANCELLED | OUTPATIENT
Start: 2019-05-07 | End: 2019-05-07

## 2019-05-06 NOTE — PROGRESS NOTES
"Pharmacy Chemotherapy Calculation:    Patient Name: Karlene Walters   Dx: Colon CA        Cycle 82 ([place] cycle 74)    Previous treatment: 04/23/19    Protocol: 5-FU + Leucovorin      IV over 2 hours on Day 1, followed by    IVP on Day 1, followed by                               -- 10/31/17: delete Leucovorin and 5-FU push d/t neutropenia per Dr. Hardy   Fluorouracil  continuous infusion over 46-48 hours                              -- 20% reduction (1920 mg/m²) to be continued starting C28 per C Alsop APRN   14 day cycles for 12 cycles (adjuvant) or until disease progression or unacceptable toxicity  NCCN Guidelines for Colon Cancer. V.2.2015.  Jay T, et al. Eur J Cancer.1999;35(9):1343-7.      Allergies: Codeine; Oxaliplatin; Pcn; Sulfa drugs; and Tape       /74   Pulse 96   Temp 36.6 °C (97.9 °F) (Temporal)   Resp 18   Ht 1.651 m (5' 5\") Comment: Transfered v/s from this morning.  Wt 77.2 kg (170 lb 3.1 oz)   LMP  (LMP Unknown)   SpO2 99%   BMI 28.32 kg/m²  Body surface area is 1.88 meters squared.       LABS:  ANC ~ 4380 Plt = 266 Hgb = 12.7   Cr = 1.04 Cr Cl ~ 53 ml/ min  LFTs = 23/17/149 TBili = 1.0     Fluorouracil (5-FU) 1920 mg/m² x 1.88 m² = 3610 mg              <5% difference, OK to treat with final dose = 3610 mg CIVI over 46 hrs at 1.6 ml/hr     Norris Mitchell, PharmD    "

## 2019-05-07 ENCOUNTER — OFFICE VISIT (OUTPATIENT)
Dept: HEMATOLOGY ONCOLOGY | Facility: MEDICAL CENTER | Age: 79
End: 2019-05-07
Payer: MEDICARE

## 2019-05-07 ENCOUNTER — OUTPATIENT INFUSION SERVICES (OUTPATIENT)
Dept: ONCOLOGY | Facility: MEDICAL CENTER | Age: 79
End: 2019-05-07
Attending: INTERNAL MEDICINE
Payer: MEDICARE

## 2019-05-07 ENCOUNTER — TELEPHONE (OUTPATIENT)
Dept: HEMATOLOGY ONCOLOGY | Facility: MEDICAL CENTER | Age: 79
End: 2019-05-07

## 2019-05-07 VITALS
WEIGHT: 170.19 LBS | TEMPERATURE: 97.9 F | HEIGHT: 65 IN | RESPIRATION RATE: 18 BRPM | DIASTOLIC BLOOD PRESSURE: 74 MMHG | OXYGEN SATURATION: 99 % | BODY MASS INDEX: 28.36 KG/M2 | SYSTOLIC BLOOD PRESSURE: 128 MMHG | HEART RATE: 96 BPM

## 2019-05-07 VITALS
HEIGHT: 65 IN | BODY MASS INDEX: 28.3 KG/M2 | SYSTOLIC BLOOD PRESSURE: 122 MMHG | DIASTOLIC BLOOD PRESSURE: 72 MMHG | HEART RATE: 92 BPM | OXYGEN SATURATION: 96 % | TEMPERATURE: 98.6 F | WEIGHT: 169.86 LBS | RESPIRATION RATE: 18 BRPM

## 2019-05-07 VITALS
WEIGHT: 170.19 LBS | SYSTOLIC BLOOD PRESSURE: 128 MMHG | RESPIRATION RATE: 18 BRPM | HEIGHT: 65 IN | BODY MASS INDEX: 28.36 KG/M2 | DIASTOLIC BLOOD PRESSURE: 74 MMHG | HEART RATE: 96 BPM | OXYGEN SATURATION: 99 % | TEMPERATURE: 97.9 F

## 2019-05-07 DIAGNOSIS — C20 RECTAL CANCER (HCC): Chronic | ICD-10-CM

## 2019-05-07 DIAGNOSIS — C78.7 SECONDARY MALIGNANT NEOPLASM OF LIVER (HCC): Chronic | ICD-10-CM

## 2019-05-07 DIAGNOSIS — C20 RECTAL CANCER (HCC): ICD-10-CM

## 2019-05-07 DIAGNOSIS — R94.4 DECREASED GFR: ICD-10-CM

## 2019-05-07 DIAGNOSIS — R52 UNCONTROLLED PAIN: ICD-10-CM

## 2019-05-07 DIAGNOSIS — Z86.711 HISTORY OF PULMONARY EMBOLUS (PE): ICD-10-CM

## 2019-05-07 DIAGNOSIS — R12 HEARTBURN: ICD-10-CM

## 2019-05-07 LAB
ALBUMIN SERPL BCP-MCNC: 3.7 G/DL (ref 3.2–4.9)
ALBUMIN/GLOB SERPL: 1.3 G/DL
ALP SERPL-CCNC: 149 U/L (ref 30–99)
ALT SERPL-CCNC: 17 U/L (ref 2–50)
ANION GAP SERPL CALC-SCNC: 10 MMOL/L (ref 0–11.9)
AST SERPL-CCNC: 23 U/L (ref 12–45)
BASOPHILS # BLD AUTO: 0.8 % (ref 0–1.8)
BASOPHILS # BLD: 0.05 K/UL (ref 0–0.12)
BILIRUB SERPL-MCNC: 1 MG/DL (ref 0.1–1.5)
BUN SERPL-MCNC: 18 MG/DL (ref 8–22)
CALCIUM SERPL-MCNC: 9.2 MG/DL (ref 8.5–10.5)
CHLORIDE SERPL-SCNC: 107 MMOL/L (ref 96–112)
CO2 SERPL-SCNC: 23 MMOL/L (ref 20–33)
CREAT SERPL-MCNC: 1.04 MG/DL (ref 0.5–1.4)
EOSINOPHIL # BLD AUTO: 0.19 K/UL (ref 0–0.51)
EOSINOPHIL NFR BLD: 2.9 % (ref 0–6.9)
ERYTHROCYTE [DISTWIDTH] IN BLOOD BY AUTOMATED COUNT: 59.1 FL (ref 35.9–50)
GLOBULIN SER CALC-MCNC: 2.9 G/DL (ref 1.9–3.5)
GLUCOSE SERPL-MCNC: 100 MG/DL (ref 65–99)
HCT VFR BLD AUTO: 41.9 % (ref 37–47)
HGB BLD-MCNC: 12.7 G/DL (ref 12–16)
IMM GRANULOCYTES # BLD AUTO: 0.03 K/UL (ref 0–0.11)
IMM GRANULOCYTES NFR BLD AUTO: 0.5 % (ref 0–0.9)
LYMPHOCYTES # BLD AUTO: 1.04 K/UL (ref 1–4.8)
LYMPHOCYTES NFR BLD: 15.9 % (ref 22–41)
MCH RBC QN AUTO: 27.4 PG (ref 27–33)
MCHC RBC AUTO-ENTMCNC: 30.3 G/DL (ref 33.6–35)
MCV RBC AUTO: 90.5 FL (ref 81.4–97.8)
MONOCYTES # BLD AUTO: 0.85 K/UL (ref 0–0.85)
MONOCYTES NFR BLD AUTO: 13 % (ref 0–13.4)
NEUTROPHILS # BLD AUTO: 4.38 K/UL (ref 2–7.15)
NEUTROPHILS NFR BLD: 66.9 % (ref 44–72)
NRBC # BLD AUTO: 0 K/UL
NRBC BLD-RTO: 0 /100 WBC
PLATELET # BLD AUTO: 266 K/UL (ref 164–446)
PMV BLD AUTO: 9.8 FL (ref 9–12.9)
POTASSIUM SERPL-SCNC: 3.9 MMOL/L (ref 3.6–5.5)
PROT SERPL-MCNC: 6.6 G/DL (ref 6–8.2)
RBC # BLD AUTO: 4.63 M/UL (ref 4.2–5.4)
SODIUM SERPL-SCNC: 140 MMOL/L (ref 135–145)
WBC # BLD AUTO: 6.5 K/UL (ref 4.8–10.8)

## 2019-05-07 PROCEDURE — 700111 HCHG RX REV CODE 636 W/ 250 OVERRIDE (IP): Performed by: NURSE PRACTITIONER

## 2019-05-07 PROCEDURE — 700111 HCHG RX REV CODE 636 W/ 250 OVERRIDE (IP)

## 2019-05-07 PROCEDURE — 99214 OFFICE O/P EST MOD 30 MIN: CPT | Performed by: NURSE PRACTITIONER

## 2019-05-07 PROCEDURE — 80053 COMPREHEN METABOLIC PANEL: CPT

## 2019-05-07 PROCEDURE — A4212 NON CORING NEEDLE OR STYLET: HCPCS

## 2019-05-07 PROCEDURE — 96374 THER/PROPH/DIAG INJ IV PUSH: CPT | Mod: XU

## 2019-05-07 PROCEDURE — 85025 COMPLETE CBC W/AUTO DIFF WBC: CPT

## 2019-05-07 PROCEDURE — G0498 CHEMO EXTEND IV INFUS W/PUMP: HCPCS

## 2019-05-07 PROCEDURE — 82378 CARCINOEMBRYONIC ANTIGEN: CPT

## 2019-05-07 RX ORDER — DEXAMETHASONE SODIUM PHOSPHATE 4 MG/ML
8 INJECTION, SOLUTION INTRA-ARTICULAR; INTRALESIONAL; INTRAMUSCULAR; INTRAVENOUS; SOFT TISSUE ONCE
Status: COMPLETED | OUTPATIENT
Start: 2019-05-07 | End: 2019-05-07

## 2019-05-07 RX ORDER — OXYCODONE HYDROCHLORIDE 5 MG/1
5 TABLET ORAL EVERY 8 HOURS PRN
Qty: 45 TAB | Refills: 0 | Status: SHIPPED | OUTPATIENT
Start: 2019-05-07 | End: 2019-05-21

## 2019-05-07 RX ORDER — RANITIDINE 150 MG/1
150 TABLET ORAL 2 TIMES DAILY
Qty: 60 TAB | Refills: 3 | Status: SHIPPED | OUTPATIENT
Start: 2019-05-07 | End: 2019-10-08

## 2019-05-07 RX ADMIN — FLUOROURACIL 3610 MG: 50 INJECTION, SOLUTION INTRAVENOUS at 09:17

## 2019-05-07 RX ADMIN — DEXAMETHASONE SODIUM PHOSPHATE 8 MG: 4 INJECTION, SOLUTION INTRA-ARTICULAR; INTRALESIONAL; INTRAMUSCULAR; INTRAVENOUS; SOFT TISSUE at 08:53

## 2019-05-07 RX ADMIN — HEPARIN 500 UNITS: 100 SYRINGE at 07:15

## 2019-05-07 ASSESSMENT — ENCOUNTER SYMPTOMS
TINGLING: 0
VOMITING: 0
NAUSEA: 1
COUGH: 1
CONSTIPATION: 0
HEMOPTYSIS: 0
WHEEZING: 0
DIARRHEA: 0
WEIGHT LOSS: 0
HEADACHES: 1
SPUTUM PRODUCTION: 1
SHORTNESS OF BREATH: 1
PALPITATIONS: 0
DIZZINESS: 0
FEVER: 0
CHILLS: 0

## 2019-05-07 ASSESSMENT — PAIN SCALES - GENERAL
PAINLEVEL: NO PAIN
PAINLEVEL: NO PAIN

## 2019-05-07 NOTE — PROGRESS NOTES
Chemotherapy Verification - PRIMARY RN      Height = 165.1 cm  Weight = 77.2 kg  BSA = 1.88 m^2       Medication: fluorouracil  Dose: 1920 mg/m^2  Calculated Dose: 3609.6 mg                             (In mg/m2, AUC, mg/kg)     I confirm this process was performed independently with the BSA and all final chemotherapy dosing calculations congruent.  Any discrepancies of 5% or greater have been addressed with the chemotherapy pharmacist. The resolution of the discrepancy has been documented in the EPIC progress notes.

## 2019-05-07 NOTE — PROGRESS NOTES
"Pharmacy Chemotherapy Verification    Dx: Colon CA        Protocol: 5-FU + Leucovorin      IV over 2 hours on Day 1, followed by    IVP on Day 1, followed by         -- 10/31/17: delete Leucovorin and 5-FU push d/t neutropenia per Dr. Hardy   Fluorouracil   IV continuous infusion daily on Days 1 and 2 ( IV over 46-48 hours)        -- 20% reduction (1920 mg/m²) to be continued starting C28 per C Alsop APRN   14 day cycles for 12 cycles (adjuvant) or until disease progression or unacceptable toxicity  NCCN Guidelines for Colon Cancer. V.2.2015.  Jay T, et al. Eur J Cancer.1999;35(9):1343-7.      Allergies:  Codeine; Oxaliplatin; Pcn; Sulfa drugs; and Tape       /74   Pulse 96   Temp 36.6 °C (97.9 °F) (Temporal)   Resp 18   Ht 1.651 m (5' 5\") Comment: Transfered v/s from this morning.  Wt 77.2 kg (170 lb 3.1 oz)   LMP  (LMP Unknown)   SpO2 99%   BMI 28.32 kg/m²  Body surface area is 1.88 meters squared.     Labs from 5/7/19 reviewed - all within treatment parameters.    Drug Order   (Drug name, dose, route, IV Fluid & volume, frequency, number of doses) Cycle 82 (C74 in [place])   Previous treatment: C81 = 4/23/19   Medication = Fluorouracil (5-FU)   Base Dose = 1920 mg/m²  Calc Dose:Base Dose x 1.88 m² = 3609.6 mg  Final Dose = 3610 mg   Route = CIVI  Drug Conc = 50 mg/mL  Fluid & Volume = 72.2 mL (+ 3 mL overfill)  Admin Duration = CIVI over 46 hrs @ 1.6 mL/hr   Via CADD pump for home infusion        <5% difference, OK to treat with final dose     By my signature below, I confirm this process was performed independently with the BSA and all final chemotherapy dosing calculations congruent. I have reviewed the above chemotherapy order and that my calculation of the final dose and BSA (when applicable) corroborate those calculations of the  pharmacist.     Tiffanie Olivier, PharmD, BCOP      "

## 2019-05-07 NOTE — PROGRESS NOTES
Paitient here for CADD home pump connect. Port previously accessed; brisk blood return noted. Labs drawn earlier today and reviewed today. Patient connected to 5FU CADD pump running at 1.6 ml/hr. Total volume with overfill = 73.5 ml. Minimum to infuse = 72.2 ml. Next appointment scheduled. Discharged to self care; no apparent distress noted.

## 2019-05-07 NOTE — PROGRESS NOTES
Patient presents for lab draw prior to chemotherapy later today. Port accessed using sterile technique, flushes well with blood drawn as ordered. Port flushed per protocol and left accessed. Patient returns today.

## 2019-05-07 NOTE — PROGRESS NOTES
Subjective:      Karlene Walters is a 79 y.o. female who presents with Follow-Up (prechemo (Dr. Hardy)) for metastatic rectal carcinoma.        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  She is accompanied by her  for today's visit.  Patient is scheduled to receive cycle #74 of single agent 5-FU.    Patient is tolerating treatment fairly well.  She is noticing that she is experiencing nausea more often and needing to take Zofran.  Sometimes she will needed at all throughout the day and other times she might need to take it 3-4 times per day.  She is noticing that it is more present with large meals.  She also does tend to get nauseated when she gets angry or upset as well.  She also states food is not tasting very good anymore.  Her weight is stable and she is attempting to lose weight.  She does have fatigue present at times.  She states that she has good and bad days.  The day after chemotherapy is her worst day where she tends to rest all day long.  She denies any fevers or chills.  She does have a cough every once in a while and does bring up thick clear mucus initially in the morning.  She does experience shortness of breath is still present but states it is better.  She is taking her inhalers more consistently as prescribed.  She does still get the chest wall pain from her chemotherapy the day chemotherapy pump is removed.  She has undergone multiple work-up and has been cleared by cardiology that this is likely related to chemo.  She takes oxycodone with positive results for either 1 to 2 days.  She states her output via her ostomy is quite stable.  She does have some watery output at times due to her food choices.  She notes that she is voiding without difficulty.  She states her volume is not significantly high however day she takes Lasix she will experience increased urination.  She is taking Lasix with potassium as needed per cardiology, and  "states she is using it approximately twice per day.  She notes every once in a while she may have a sharp pain thought to be a headache but lasts just about 5 to 10 minutes per    Allergies   Allergen Reactions   • Oxaliplatin Anaphylaxis   • Codeine      \"gets drunk\"   • Pcn [Penicillins] Itching   • Sulfa Drugs Itching   • Tape Rash     PAPER TAPE OK     Current Outpatient Prescriptions on File Prior to Visit   Medication Sig Dispense Refill   • Tiotropium Bromide-Olodaterol (STIOLTO RESPIMAT) 2.5-2.5 MCG/ACT Aero Soln Take 2 Puffs by mouth every day. 1 Inhaler 11   • NON SPECIFIED by Intravenous route every 14 days. Chemo 5 FU      • metronidazole (METROCREAM) 0.75 % cream Apply 1 Each to affected area(s) 2 times a day.     • cyanocobalamin (VITAMIN B-12) 500 MCG Tab Take 500 mcg by mouth every day.     • Multiple Vitamins-Minerals (CENTRUM SILVER PO) Take 1 Tab by mouth every day.     • Cholecalciferol (VITAMIN D3) 400 UNITS CAPS Take 1 Cap by mouth every day.     • loratadine (CLARITIN) 10 MG TABS Take 10 mg by mouth every day. Indications: Hayfever     • lidocaine-prilocaine (EMLA) 2.5-2.5 % Cream APPLY A THICK FILM OVER THE PORT ACCESS SITE PRIOR TO ACCESS 30 g 3   • ondansetron (ZOFRAN) 4 MG Tab tablet Take 1 Tab by mouth as needed. 30 Tab 3   • albuterol (PROAIR HFA) 108 (90 Base) MCG/ACT Aero Soln inhalation aerosol Inhale 2 Puffs by mouth every four hours as needed for Shortness of Breath (wheezing). 1 Inhaler 11   • furosemide (LASIX) 40 MG Tab Take 1 Tab by mouth every day. 90 Tab 3   • potassium chloride ER (KLOR-CON) 10 MEQ tablet TAKE ONE TABLET EVERY DAY AS NEEDED 30 Tab 6   • loperamide (IMODIUM) 2 MG Cap Take 2 mg by mouth 4 times a day as needed for Diarrhea.       No current facility-administered medications on file prior to visit.        Review of Systems   Constitutional: Positive for malaise/fatigue (present at times - good days and bad days - day after chemo the worse day). Negative for " "chills, fever and weight loss.   Respiratory: Positive for cough, sputum production (thick clear mucous initially in the AM) and shortness of breath (still present but better when she takes inhalers as prescribed). Negative for hemoptysis and wheezing.    Cardiovascular: Negative for chest pain and palpitations.        Chest wall pain from chemo day of chemo that resolves - cleared with cardiology - takes Oxycodone with positive results - usually takes 2 tabs on Thursday of chemo and another one maybe on Fridays   Gastrointestinal: Positive for nausea. Negative for constipation, diarrhea and vomiting.        Ostomy output stable - watery stool depending on her food choices   Genitourinary: Negative for dysuria.        Volume not as high daily until she takes Lasix - takes Lasix up to twice a week per Cardiology   Skin: Negative for itching and rash.   Neurological: Positive for headaches (sharp pains every once in a while). Negative for dizziness and tingling.          Objective:     /72 (BP Location: Right arm, Patient Position: Sitting, BP Cuff Size: Adult)   Pulse 92   Temp 37 °C (98.6 °F) (Temporal)   Resp 18   Ht 1.651 m (5' 5\")   Wt 77 kg (169 lb 13.8 oz)   LMP  (LMP Unknown)   SpO2 96%   BMI 28.27 kg/m²      Physical Exam   Constitutional: She is oriented to person, place, and time. She appears well-developed and well-nourished.   HENT:   Head: Normocephalic and atraumatic.   Mouth/Throat: Oropharynx is clear and moist. No oropharyngeal exudate.   Cardiovascular: Normal rate, regular rhythm, normal heart sounds and intact distal pulses.  Exam reveals no gallop and no friction rub.    No murmur heard.  Pulmonary/Chest: Effort normal and breath sounds normal. No respiratory distress. She has no wheezes.   Abdominal: Soft. Bowel sounds are normal. She exhibits no distension. There is no tenderness.   Musculoskeletal: Normal range of motion. She exhibits no edema or tenderness.   Neurological: She " is alert and oriented to person, place, and time.   Skin: Skin is warm and dry. No rash noted. She is not diaphoretic. No erythema. No pallor.   Psychiatric: She has a normal mood and affect. Her behavior is normal.   Vitals reviewed.      [facility] on 05/07/2019   Component Date Value Ref Range Status   • WBC 05/07/2019 6.5  4.8 - 10.8 K/uL Final   • RBC 05/07/2019 4.63  4.20 - 5.40 M/uL Final   • Hemoglobin 05/07/2019 12.7  12.0 - 16.0 g/dL Final   • Hematocrit 05/07/2019 41.9  37.0 - 47.0 % Final   • MCV 05/07/2019 90.5  81.4 - 97.8 fL Final   • MCH 05/07/2019 27.4  27.0 - 33.0 pg Final   • MCHC 05/07/2019 30.3* 33.6 - 35.0 g/dL Final   • RDW 05/07/2019 59.1* 35.9 - 50.0 fL Final   • Platelet Count 05/07/2019 266  164 - 446 K/uL Final   • MPV 05/07/2019 9.8  9.0 - 12.9 fL Final   • Neutrophils-Polys 05/07/2019 66.90  44.00 - 72.00 % Final   • Lymphocytes 05/07/2019 15.90* 22.00 - 41.00 % Final   • Monocytes 05/07/2019 13.00  0.00 - 13.40 % Final   • Eosinophils 05/07/2019 2.90  0.00 - 6.90 % Final   • Basophils 05/07/2019 0.80  0.00 - 1.80 % Final   • Immature Granulocytes 05/07/2019 0.50  0.00 - 0.90 % Final   • Nucleated RBC 05/07/2019 0.00  /100 WBC Final   • Neutrophils (Absolute) 05/07/2019 4.38  2.00 - 7.15 K/uL Final    Includes immature neutrophils, if present.   • Lymphs (Absolute) 05/07/2019 1.04  1.00 - 4.80 K/uL Final   • Monos (Absolute) 05/07/2019 0.85  0.00 - 0.85 K/uL Final   • Eos (Absolute) 05/07/2019 0.19  0.00 - 0.51 K/uL Final   • Baso (Absolute) 05/07/2019 0.05  0.00 - 0.12 K/uL Final   • Immature Granulocytes (abs) 05/07/2019 0.03  0.00 - 0.11 K/uL Final   • NRBC (Absolute) 05/07/2019 0.00  K/uL Final   • Sodium 05/07/2019 140  135 - 145 mmol/L Final   • Potassium 05/07/2019 3.9  3.6 - 5.5 mmol/L Final   • Chloride 05/07/2019 107  96 - 112 mmol/L Final   • Co2 05/07/2019 23  20 - 33 mmol/L Final   • Anion Gap 05/07/2019 10.0  0.0 - 11.9 Final   • Glucose 05/07/2019 100*  65 - 99 mg/dL Final   • Bun 05/07/2019 18  8 - 22 mg/dL Final   • Creatinine 05/07/2019 1.04  0.50 - 1.40 mg/dL Final   • Calcium 05/07/2019 9.2  8.5 - 10.5 mg/dL Final   • AST(SGOT) 05/07/2019 23  12 - 45 U/L Final   • ALT(SGPT) 05/07/2019 17  2 - 50 U/L Final   • Alkaline Phosphatase 05/07/2019 149* 30 - 99 U/L Final   • Total Bilirubin 05/07/2019 1.0  0.1 - 1.5 mg/dL Final   • Albumin 05/07/2019 3.7  3.2 - 4.9 g/dL Final   • Total Protein 05/07/2019 6.6  6.0 - 8.2 g/dL Final   • Globulin 05/07/2019 2.9  1.9 - 3.5 g/dL Final   • A-G Ratio 05/07/2019 1.3  g/dL Final   • GFR If  05/07/2019 >60  >60 mL/min/1.73 m 2 Final   • GFR If Non  05/07/2019 51* >60 mL/min/1.73 m 2 Final          Assessment/Plan:     1. Rectal cancer (HCC)  raNITidine (ZANTAC) 150 MG Tab    rivaroxaban (XARELTO) 10 MG Tab tablet    oxyCODONE immediate-release (ROXICODONE) 5 MG Tab    Consent for Opiate Prescription   2. Secondary malignant neoplasm of liver (HCC)  raNITidine (ZANTAC) 150 MG Tab    rivaroxaban (XARELTO) 10 MG Tab tablet    oxyCODONE immediate-release (ROXICODONE) 5 MG Tab    Consent for Opiate Prescription   3. Heartburn  raNITidine (ZANTAC) 150 MG Tab   4. Uncontrolled pain  oxyCODONE immediate-release (ROXICODONE) 5 MG Tab    Consent for Opiate Prescription   5. History of pulmonary embolus (PE)  rivaroxaban (XARELTO) 10 MG Tab tablet       Plan  1.  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  She is scheduled to receive cycle #74 of single agent 5-FU.  She has done very well and tolerated treatment well.  Clinically she is stable.  I did review her CBC and CMP and labs are appropriate to proceed with treatment as planned.    2.  Patient a few months back had developed some progression which was limited to the liver.  She underwent microwave ablation with normalization of her CEA levels.  PET scan did not reveal any other sites of disease.  Her CEA has overall improved.  According to   [facility] patient is experiencing recently good tolerance with maintenance 5-FU and is recommended to continue on 5-FU as planned.  Patient is followed by Dr. West, surgeon, as well and does appear that he has scheduled a CT abdomen in September with follow up with Dr. West afterwards.  Patient wanting to continue with 5-FU maintenance.    3.  Refills have been provided to patient including her oxycodone.  I did review the [place] prescription monitoring program website and she was issued a prescription on January 7, 2019 which she filled on January 15, 2019 by myself.  She was given oxycodone 5 mg tablets quantity #45 at that time.  As mentioned above she is taking oxycodone right after treatment due to chest wall pain that she has been experiencing consistently.  She did state that she has tried tramadol in the past with no relief.  Oxycodone has been very effective.  I did provide patient with a refill of oxycodone 5 mg tablets for her to take as needed, quantity #45 today.    Opioid Risk Score: 0    Interpretation of Opioid Risk Score   Score 0-3 = Low risk of abuse. Do UDS at least once per year.  Score 4-7 = Moderate risk of abuse. Do UDS 1-4 times per year.  Score 8+ = High risk of abuse. Refer to specialist.    In prescribing controlled substances to this patient, I certify that I have obtained and reviewed the medical history of Karlene Walters. I have also made a good dionte effort to obtain applicable records from other providers who have treated the patient and records did not demonstrate any increased risk of substance abuse that would prevent me from prescribing controlled substances.     I have conducted a physical exam and documented it. I have reviewed Ms. Walters’s prescription history as maintained by the [place] Prescription Monitoring Program.     I have assessed the patient’s risk for abuse, dependency, and addiction using the validated Opioid Risk Tool available at  https://www.mdcalc.com/dffpda-wesr-paav-ort-narcotic-abuse.     Given the above, I believe the benefits of controlled substance therapy outweigh the risks. The reasons for prescribing controlled substances include non-narcotic, oral analgesic alternatives have been inadequate for pain control. Accordingly, I have discussed the risk and benefits, treatment plan, and alternative therapies with the patient.     4.  Patient with a history of pulmonary emboli.  She has been on Xarelto for quite some time.  Dr. Hardy has decreased her dose to 10 mg daily for continued maintenance.  Refill has been provided to patient today.    5.  Refill also provided to patient for Zantac for heartburn which is been very effective.    6.  Patient will follow-up in the clinic in approximately 2 weeks or sooner if needed to see Dr. Hardy.  She will then return to being seen in the clinic monthly with treatment every 2 weeks as scheduled.      Patient has elected to stay within the [facility] system.

## 2019-05-07 NOTE — PROGRESS NOTES
Chemotherapy Verification - SECONDARY RN       Height = 165.1 cm  Weight = 77.2 kg  BSA = 1.88 m2       Medication: Home infusion 5FU  Dose: 1920 mg/m2  Calculated Dose: 3612.7 mg                             (In mg/m2, AUC, mg/kg)       I confirm that this process was performed independently.

## 2019-05-08 LAB — TEST NAME 95000: NORMAL

## 2019-05-09 ENCOUNTER — OUTPATIENT INFUSION SERVICES (OUTPATIENT)
Dept: ONCOLOGY | Facility: MEDICAL CENTER | Age: 79
End: 2019-05-09
Attending: INTERNAL MEDICINE
Payer: MEDICARE

## 2019-05-09 VITALS
OXYGEN SATURATION: 97 % | DIASTOLIC BLOOD PRESSURE: 68 MMHG | TEMPERATURE: 97.1 F | RESPIRATION RATE: 18 BRPM | HEIGHT: 65 IN | SYSTOLIC BLOOD PRESSURE: 122 MMHG | HEART RATE: 100 BPM | BODY MASS INDEX: 27.77 KG/M2 | WEIGHT: 166.67 LBS

## 2019-05-09 DIAGNOSIS — C20 RECTAL CANCER (HCC): ICD-10-CM

## 2019-05-09 DIAGNOSIS — R94.4 DECREASED GFR: ICD-10-CM

## 2019-05-09 PROCEDURE — 700111 HCHG RX REV CODE 636 W/ 250 OVERRIDE (IP): Performed by: NURSE PRACTITIONER

## 2019-05-09 PROCEDURE — 96523 IRRIG DRUG DELIVERY DEVICE: CPT

## 2019-05-09 RX ADMIN — HEPARIN 500 UNITS: 100 SYRINGE at 08:17

## 2019-05-09 NOTE — PROGRESS NOTES
"Pt called very early this AM reporting her pump was alarming \"no cartridge detected\". Requested pt to present to [facility] as soon as she can. Pump settings reviewed and remaining vol observed at 0.8 ml. Verified with MAR pt received total dose of chemo. Pump removed, port flushed, and blood return observed. Heparin instilled and needle removed, tip intact. Gauze dressing applied. Pt knows when to return. Discharged home under self care in no apparent distress.   "

## 2019-05-20 NOTE — PROGRESS NOTES
"Pharmacy Chemotherapy Verification    Dx: Colon CA        Protocol: 5-FU + Leucovorin      IV over 2 hours on Day 1, followed by    IVP on Day 1, followed by         -- 10/31/17: delete Leucovorin and 5-FU push d/t neutropenia per Dr. Hardy   Fluorouracil   IV continuous infusion daily on Days 1 and 2 ( IV over 46-48 hours)        -- 20% reduction (1920 mg/m²) to be continued starting C28 per C Alsop APRN   14 day cycles for 12 cycles (adjuvant) or until disease progression or unacceptable toxicity  NCCN Guidelines for Colon Cancer. V.2.2015.  Jay GROVER, et al. Eur J Cancer.1999;35(9):1343-7.      Allergies:  Codeine; Oxaliplatin; Pcn; Sulfa drugs; and Tape       /63   Pulse 96   Temp 36.4 °C (97.5 °F) (Temporal) Comment: transfered vitals from earlier this morning.  Resp 18   Ht 1.651 m (5' 5\") Comment: transfered weight from earlier this morninig.  Wt 76.8 kg (169 lb 5 oz) Comment: transfered height from earlier this morninig.  LMP  (LMP Unknown)   SpO2 98%   BMI 28.18 kg/m²  Body surface area is 1.88 meters squared.     Labs from 5/21/19 reviewed - all within treatment parameters.    Drug Order   (Drug name, dose, route, IV Fluid & volume, frequency, number of doses) Cycle 83 (C75 in [place])   Previous treatment: C82 = 5/7/19   Medication = Fluorouracil (5-FU)   Base Dose = 1920 mg/m²  Calc Dose:Base Dose x 1.88m² = 3609.6mg  Final Dose = 3610mg   Route = CIVI  Drug Conc = 50 mg/mL  Fluid & Volume = 72.2mL (+ 3 mL overfill)  Admin Duration = CIVI over 46 hrs @ 1.6mL/hr   Via CADD pump for home infusion        <10% difference, OK to treat with final dose     By my signature below, I confirm this process was performed independently with the BSA and all final chemotherapy dosing calculations congruent. I have reviewed the above chemotherapy order and that my calculation of the final dose and BSA (when applicable) corroborate those calculations of the  pharmacist.     DARIN Arroyo, " Pharm.D.

## 2019-05-21 ENCOUNTER — OFFICE VISIT (OUTPATIENT)
Dept: HEMATOLOGY ONCOLOGY | Facility: MEDICAL CENTER | Age: 79
End: 2019-05-21
Payer: MEDICARE

## 2019-05-21 ENCOUNTER — OUTPATIENT INFUSION SERVICES (OUTPATIENT)
Dept: ONCOLOGY | Facility: MEDICAL CENTER | Age: 79
End: 2019-05-21
Attending: INTERNAL MEDICINE
Payer: MEDICARE

## 2019-05-21 VITALS
TEMPERATURE: 97.5 F | SYSTOLIC BLOOD PRESSURE: 122 MMHG | RESPIRATION RATE: 14 BRPM | OXYGEN SATURATION: 95 % | BODY MASS INDEX: 28.41 KG/M2 | HEART RATE: 90 BPM | DIASTOLIC BLOOD PRESSURE: 76 MMHG | WEIGHT: 170.53 LBS | HEIGHT: 65 IN

## 2019-05-21 VITALS
HEIGHT: 65 IN | RESPIRATION RATE: 18 BRPM | TEMPERATURE: 97.5 F | BODY MASS INDEX: 28.21 KG/M2 | WEIGHT: 169.31 LBS | OXYGEN SATURATION: 98 % | SYSTOLIC BLOOD PRESSURE: 144 MMHG | DIASTOLIC BLOOD PRESSURE: 63 MMHG | HEART RATE: 96 BPM

## 2019-05-21 VITALS
OXYGEN SATURATION: 98 % | HEART RATE: 96 BPM | RESPIRATION RATE: 18 BRPM | SYSTOLIC BLOOD PRESSURE: 144 MMHG | WEIGHT: 169.31 LBS | DIASTOLIC BLOOD PRESSURE: 63 MMHG | TEMPERATURE: 97.5 F | HEIGHT: 65 IN | BODY MASS INDEX: 28.21 KG/M2

## 2019-05-21 DIAGNOSIS — C20 RECTAL CANCER (HCC): Chronic | ICD-10-CM

## 2019-05-21 DIAGNOSIS — R94.4 DECREASED GFR: ICD-10-CM

## 2019-05-21 DIAGNOSIS — C20 RECTAL CANCER (HCC): ICD-10-CM

## 2019-05-21 DIAGNOSIS — C78.7 SECONDARY MALIGNANT NEOPLASM OF LIVER (HCC): Chronic | ICD-10-CM

## 2019-05-21 LAB
BASOPHILS # BLD AUTO: 1 % (ref 0–1.8)
BASOPHILS # BLD: 0.06 K/UL (ref 0–0.12)
EOSINOPHIL # BLD AUTO: 0.18 K/UL (ref 0–0.51)
EOSINOPHIL NFR BLD: 3 % (ref 0–6.9)
ERYTHROCYTE [DISTWIDTH] IN BLOOD BY AUTOMATED COUNT: 59.1 FL (ref 35.9–50)
HCT VFR BLD AUTO: 40.2 % (ref 37–47)
HGB BLD-MCNC: 12.7 G/DL (ref 12–16)
IMM GRANULOCYTES # BLD AUTO: 0.02 K/UL (ref 0–0.11)
IMM GRANULOCYTES NFR BLD AUTO: 0.3 % (ref 0–0.9)
LYMPHOCYTES # BLD AUTO: 1.14 K/UL (ref 1–4.8)
LYMPHOCYTES NFR BLD: 18.9 % (ref 22–41)
MCH RBC QN AUTO: 28.4 PG (ref 27–33)
MCHC RBC AUTO-ENTMCNC: 31.6 G/DL (ref 33.6–35)
MCV RBC AUTO: 89.9 FL (ref 81.4–97.8)
MONOCYTES # BLD AUTO: 0.74 K/UL (ref 0–0.85)
MONOCYTES NFR BLD AUTO: 12.3 % (ref 0–13.4)
NEUTROPHILS # BLD AUTO: 3.88 K/UL (ref 2–7.15)
NEUTROPHILS NFR BLD: 64.5 % (ref 44–72)
NRBC # BLD AUTO: 0 K/UL
NRBC BLD-RTO: 0 /100 WBC
PLATELET # BLD AUTO: 241 K/UL (ref 164–446)
PMV BLD AUTO: 10.3 FL (ref 9–12.9)
RBC # BLD AUTO: 4.47 M/UL (ref 4.2–5.4)
WBC # BLD AUTO: 6 K/UL (ref 4.8–10.8)

## 2019-05-21 PROCEDURE — 99214 OFFICE O/P EST MOD 30 MIN: CPT | Performed by: INTERNAL MEDICINE

## 2019-05-21 PROCEDURE — 306780 HCHG STAT FOR TRANSFUSION PER CASE

## 2019-05-21 PROCEDURE — G0498 CHEMO EXTEND IV INFUS W/PUMP: HCPCS

## 2019-05-21 PROCEDURE — 85025 COMPLETE CBC W/AUTO DIFF WBC: CPT

## 2019-05-21 PROCEDURE — A4212 NON CORING NEEDLE OR STYLET: HCPCS

## 2019-05-21 PROCEDURE — 96374 THER/PROPH/DIAG INJ IV PUSH: CPT | Mod: XU

## 2019-05-21 PROCEDURE — 96375 TX/PRO/DX INJ NEW DRUG ADDON: CPT

## 2019-05-21 PROCEDURE — 700111 HCHG RX REV CODE 636 W/ 250 OVERRIDE (IP): Performed by: INTERNAL MEDICINE

## 2019-05-21 PROCEDURE — 700111 HCHG RX REV CODE 636 W/ 250 OVERRIDE (IP)

## 2019-05-21 RX ORDER — DEXAMETHASONE SODIUM PHOSPHATE 4 MG/ML
8 INJECTION, SOLUTION INTRA-ARTICULAR; INTRALESIONAL; INTRAMUSCULAR; INTRAVENOUS; SOFT TISSUE ONCE
Status: CANCELLED | OUTPATIENT
Start: 2019-05-21 | End: 2019-05-21

## 2019-05-21 RX ORDER — ONDANSETRON 8 MG/1
8 TABLET, ORALLY DISINTEGRATING ORAL
Status: CANCELLED | OUTPATIENT
Start: 2019-05-21

## 2019-05-21 RX ORDER — DEXTROSE MONOHYDRATE 50 MG/ML
INJECTION, SOLUTION INTRAVENOUS CONTINUOUS
Status: CANCELLED | OUTPATIENT
Start: 2019-05-21

## 2019-05-21 RX ORDER — DEXAMETHASONE SODIUM PHOSPHATE 4 MG/ML
8 INJECTION, SOLUTION INTRA-ARTICULAR; INTRALESIONAL; INTRAMUSCULAR; INTRAVENOUS; SOFT TISSUE ONCE
Status: COMPLETED | OUTPATIENT
Start: 2019-05-21 | End: 2019-05-21

## 2019-05-21 RX ORDER — PROCHLORPERAZINE MALEATE 10 MG
10 TABLET ORAL EVERY 6 HOURS PRN
Status: CANCELLED | OUTPATIENT
Start: 2019-05-21

## 2019-05-21 RX ORDER — ONDANSETRON 2 MG/ML
4 INJECTION INTRAMUSCULAR; INTRAVENOUS
Status: CANCELLED | OUTPATIENT
Start: 2019-05-21

## 2019-05-21 RX ORDER — LIDOCAINE HYDROCHLORIDE 10 MG/ML
10 INJECTION, SOLUTION INFILTRATION; PERINEURAL ONCE
Status: CANCELLED | OUTPATIENT
Start: 2019-05-21 | End: 2019-05-21

## 2019-05-21 RX ADMIN — DEXAMETHASONE SODIUM PHOSPHATE 8 MG: 4 INJECTION, SOLUTION INTRA-ARTICULAR; INTRALESIONAL; INTRAMUSCULAR; INTRAVENOUS; SOFT TISSUE at 11:12

## 2019-05-21 RX ADMIN — FLUOROURACIL 3610 MG: 50 INJECTION, SOLUTION INTRAVENOUS at 11:51

## 2019-05-21 ASSESSMENT — PAIN SCALES - GENERAL: PAINLEVEL: NO PAIN

## 2019-05-21 NOTE — PROGRESS NOTES
Chemotherapy Verification - PRIMARY RN      Height = 165.1 cm  Weight = 76.8 kg  BSA = 1.88 m2       Medication: Fluorouracil CADD pump  Dose: 1920mg/m2  Calculated Dose: 3609.6 mg                             (In mg/m2, AUC, mg/kg)       I confirm this process was performed independently with the BSA and all final chemotherapy dosing calculations congruent.  Any discrepancies of 10% or greater have been addressed with the chemotherapy pharmacist. The resolution of the discrepancy has been documented in the EPIC progress notes.

## 2019-05-21 NOTE — PROGRESS NOTES
Pt returns to [facility] after MD visit.  Labs reviewed and pt meets parameters for treatment. Port flushed and brisk blood return noted.  Dexamethasone given as pre-medication.  Patient connected to 5-FU CADD pump, all clamps are open and pump is functioning to infuse over 46 hours.  Confirmed next appt with patient.  Pt dc home with spouse.

## 2019-05-21 NOTE — PROGRESS NOTES
Date of visit: 5/21/2019  9:18 AM      Chief Complaint- Metastatic rectal cancer to liver-KRS mutated      Identification/Prior relevant history: Karlene Walters  is a 79 y.o. year old female who is here for follow-up of    Originally diagnosed with a rectal carcinoma in 2012 secondary to bleeding. s/pt laparoscopic resection with primary anastomosis and final pathology was consistent with well-differentiated adenocarcinoma with 0/30 nodes. She was staged pT2 N0. 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      8/2015 she was diagnosed with metastatic disease to the liver with a 2.4 cm mass as well as small pulmonary nodules and thyroid nodules. She underwent an ablative therapy to the liver. She was then started on Xelox. She had  allergic reaction to oxaliplatin and was continued on Xeloda. This eventually led to increased diarrhea and her regimen was changed to single agent 5-FU. She has been tolerating the therapy well with good responses on imaging.      3/2017- CT of the chest abdomen pelvis which has been stable and no evidence of disease. CT of the wrist was consistent with tenosynovitis and degenerative changes.     5/17-established care with me.   EGD and colonoscopy  did not reveal any local recurrence. She had some gastritis.   - CEA has trended up to 13  -PET scan isolated hepatic lesion. Patchy basilar pulmonary opacities. Seen by Dr. West referred for Y90 embolization  6/17- Y90 Theraspheres.  Continued on 5-FU.  -CEA trending down/normalized     -CT abdomen and-3/9/18- Findings are consistent with posttreatment changes in this portion of the liver after Y 90 procedure.    Restarted 5-FU maintenance, she tends to develop transient chest pain and some palmar erythema. A few days. Tears, chronic shortness of breath with occasional flareups. Follow-up with  pulmonary.    -6/29/18-CT chest negative  CEA tumor markers have been trending up.    -9/7/18-No definite new liver lesion identified.     -10/25/18-PET scan  Hypermetabolic 2.5 cm lesion adjacent/to the right of the low attenuation treatment bed in the hepatic dome, in keeping with persistent residual disease next to the treatment bed  -11/15/18-microwave Overlapping ablations to segment 7 of the liver x3, each being 4 minute at 140 yanes giving us a 4x5 cm ablation cavity overlapping the region of the lateral posterior aspect and inferior aspect of the previous ablation Cavity.  -12/10/18- Hypodense component of previously identified lesion in the right hepatic dome has increased in size to 4.6 x 5.0 cm.     -3/4/19-CT cirrhosis protocol-  Right hepatic dome treated metastasis is stable with no enhancing elements seen. This likely indicates post therapy change with no local recurrence suspected  2.  New from 2017 subcapsular left hepatic 8mm hypodense lesion does not appear to enhance. This most likely is a cyst.     -3/7/19-Saw Dr. West.  Imaging shows complete ablation of recurrence in the liver.  No new lesions.  Small liver cyst.    She is otherwise doing okay and tolerating maintenance 5-FU reasonably well.  She tend to develop some chest discomfort during the infusion for which she takes oxycodone which has helped      Interim history  -Recently returned from cruise in the [place].  Went parasailing.  -Completed cycle #74 5/7/2018. Every 2 weeks.  -Side effects: nausea, epigastric pain after infusion improved with oxycodone.  Red hands or face or feet for 1-2 days after infusion.  Some chills.  No unintentional weight loss.  No new masses.  He is to have intermittent tingling of the fingertips, which has resolved.  Dyspnea on exertion stable.  -CEA  1.5>>1.2>>1.3.      Past Medical History:      Past Medical History:   Diagnosis Date   • Arrhythmia     occasional   • Blood clotting disorder (HCC)  08/2015    bilat PE    • Blood transfusion 1957   • Breath shortness     no O2 with moderate exertion   • Cancer (HCC) 09/2012    rectal cancer,  Liver CA-2015   • CATARACT     removed b/l   • Chemotherapy-induced neutropenia (HCC) 9/28/2016   • Chickenpox    • Colon cancer (HCC)    • Dental disorder     dentures UPPERS AND LOWERS   • Elevated alkaline phosphatase level 11/15/2017   • Emphysema of lung (HCC)    • Fall    • Monegasque measles    • Heart murmur     as a child   • Hemorrhagic disorder (HCC)     on Xarelto    • Hypercholesteremia    • Hypertension     no meds currently    • Ileostomy in place (HCC)    • Indigestion    • Influenza    • Lightheadedness 9/17/2015   • Mumps    • Obstruction     ileostomy   • Other specified symptom associated with female genital organs     HYSTERECTOMY 1993   • Pneumonia 05/2017    tx'd with antibx   • Pulmonary embolism (HCC)    • 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)    • Renal insufficiency 3/14/2016   • Routine general medical examination at a health care facility 3/14/2016    3/14/16   • Seasonal allergies    • Swelling of both ankles     Lasix PRN   • Tonsillitis        Past surgical history:       Past Surgical History:   Procedure Laterality Date   • HEPATIC ABLATION LAPAROSCOPIC  11/15/2018    Procedure: HEPATIC ABLATION LAPAROSCOPIC.- SEGMENT 7/8;  Surgeon: Kit West M.D.;  Location: SURGERY [facility];  Service: General   • COLONOSCOPY WITH BIOPSY  8/23/2015    Procedure: COLONOSCOPY WITH BIOPSY;  Surgeon: Wilbur Glasgow M.D.;  Location: ENDOSCOPY [facility];  Service:    • HEPATIC ABLATION LAPAROSCOPIC  7/6/2015    Procedure: HEPATIC ABLATION LAPAROSCOPIC.;  Surgeon: Kit West M.D.;  Location: SURGERY [facility];  Service:    • CATH PLACEMENT Left 7/6/2015    Procedure: CATH PLACEMENT CEPHALIC POWERPORT;  Surgeon: Kit West M.D.;  Location: SURGERY [facility];  Service:    • FISTULA IN ANO  REPAIR  1/28/2015    Performed by Kolton Montgomery M.D. at SURGERY [facility]   • PERINEAL PROCEDURE  1/28/2015    Performed by Kolton Montgomery M.D. at [facility]   • FISTULA IN ANO REPAIR  10/15/2014    Performed by Kolton Montgomery M.D. at [facility]   • PERINEAL PROCEDURE  10/15/2014    Performed by Kolton Montgomery M.D. at SURGERY [facility]   • IRRIGATION & DEBRIDEMENT GENERAL  2/19/2014    Performed by Kolton Montgomery M.D. at [facility]   • FLAP GRAFT  2/19/2014    Performed by Kolton Montgomery M.D. at [facility]   • PERINEAL PROCEDURE  12/18/2013    Performed by Kolton Montgomery M.D. at [facility]   • MYOCUTANEOUS FLAP  12/18/2013    Performed by Kolton Montgomery M.D. at SURGERY [facility]   • IRRIGATION & DEBRIDEMENT GENERAL  10/23/2013    Performed by Kolton Montgomery M.D. at [facility]   • FISTULA IN ANO REPAIR  9/4/2013    Performed by Kolton Montgomery M.D. at [facility]   • FLAP ROTATION  9/4/2013    Performed by Kolton Montgomery M.D. at [facility]   • RECOVERY  8/26/2013    Performed by Cath-Recovery Surgery at [facility] SAME DAY [facility]   • BIOPSY GENERAL  7/17/2013    Performed by Kolton Montgomery M.D. at SURGERY [facility]   • PROCTOSCOPY  7/17/2013    Performed by Kolton Montgomery M.D. at [facility]   • FISTULA IN ANO REPAIR  2/27/2013    Performed by Kolton Montgomery M.D. at [facility]   • SIGMOIDOSCOPY  2/27/2013    Performed by Kolton Montgomery M.D. at [facility]   • LAPAROSCOPY  10/8/2012    Performed by Kolton Montgomery M.D. at [facility]   • ILEO LOOP DIVERSION  10/8/2012    Performed by Kolton Montgomery M.D. at [facility]   • COLECTOMY  9/26/2012    Performed by Kolton Montgomery M.D. at SURGERY [facility]   • COLONOSCOPY FLEX W/ENDO US  9/20/2012    Performed by Harshil Bolton M.D. at SURGERY [facility]   • OTHER  2009    left eye torn retina  repair   • CATARACT EXTRACTION WITH IOL  2004    bilateral   • ABDOMINAL HYSTERECTOMY TOTAL  1983   • CYST EXCISION  1972    ovarian   • COLON RESECTION     • EYE SURGERY      cataracts   • HYSTERECTOMY LAPAROSCOPY     • PB REMV 2ND CATARACT,CORN-SCLER SECTN     • TONSILLECTOMY         Allergies:       Oxaliplatin; Codeine; Pcn [penicillins]; Sulfa drugs; and Tape    Medications:         Current Outpatient Prescriptions   Medication Sig Dispense Refill   • raNITidine (ZANTAC) 150 MG Tab Take 1 Tab by mouth 2 Times a Day. 60 Tab 3   • rivaroxaban (XARELTO) 10 MG Tab tablet Take 1 Tab by mouth every day. 90 Tab 1   • oxyCODONE immediate-release (ROXICODONE) 5 MG Tab Take 1 Tab by mouth every 8 hours as needed for Severe Pain for up to 45 doses. 45 Tab 0   • lidocaine-prilocaine (EMLA) 2.5-2.5 % Cream APPLY A THICK FILM OVER THE PORT ACCESS SITE PRIOR TO ACCESS 30 g 3   • ondansetron (ZOFRAN) 4 MG Tab tablet Take 1 Tab by mouth as needed. 30 Tab 3   • Tiotropium Bromide-Olodaterol (STIOLTO RESPIMAT) 2.5-2.5 MCG/ACT Aero Soln Take 2 Puffs by mouth every day. 1 Inhaler 11   • albuterol (PROAIR HFA) 108 (90 Base) MCG/ACT Aero Soln inhalation aerosol Inhale 2 Puffs by mouth every four hours as needed for Shortness of Breath (wheezing). 1 Inhaler 11   • furosemide (LASIX) 40 MG Tab Take 1 Tab by mouth every day. 90 Tab 3   • NON SPECIFIED by Intravenous route every 14 days. Chemo 5 FU      • metronidazole (METROCREAM) 0.75 % cream Apply 1 Each to affected area(s) 2 times a day.     • potassium chloride ER (KLOR-CON) 10 MEQ tablet TAKE ONE TABLET EVERY DAY AS NEEDED 30 Tab 6   • loperamide (IMODIUM) 2 MG Cap Take 2 mg by mouth 4 times a day as needed for Diarrhea.     • cyanocobalamin (VITAMIN B-12) 500 MCG Tab Take 500 mcg by mouth every day.     • Multiple Vitamins-Minerals (CENTRUM SILVER PO) Take 1 Tab by mouth every day.     • Cholecalciferol (VITAMIN D3) 400 UNITS CAPS Take 1 Cap by mouth every day.     • loratadine  (CLARITIN) 10 MG TABS Take 10 mg by mouth every day. Indications: Hayfever       No current facility-administered medications for this visit.          Social History:     Social History     Social History   • Marital status:      Spouse name: N/A   • Number of children: N/A   • Years of education: N/A     Occupational History   • Not on file.     Social History Main Topics   • Smoking status: Never Smoker   • Smokeless tobacco: Never Used   • Alcohol use No      Comment: rare   • Drug use: No   • Sexual activity: No     Other Topics Concern   • Not on file     Social History Narrative   • No narrative on file       Family History:      Family History   Problem Relation Age of Onset   • Heart Disease Mother    • Heart Failure Mother    • Hypertension Mother    • Hyperlipidemia Mother    • Cancer Mother    • Cancer Maternal Aunt 60        breast ca   • Lung Disease Brother         COPD/Emphysema/Smoker   • Cancer Brother         prostate cancer   • Alcohol/Drug Brother         Alcohol   • Kidney Disease Father         renal failure       Review of Systems:  All other review of systems are negative except what was mentioned above in the HPI.    Constitutional: Negative for fever, chills, weight loss and malaise/fatigue.    HEENT: No new auditory or visual complaints. No sore throat and neck pain.     Respiratory: Negative for cough, sputum production, shortness of breath and wheezing.    Cardiovascular: Negative for chest pain, palpitations, orthopnea and leg swelling.    Gastrointestinal: Negative for heartburn, nausea, vomiting and abdominal pain.    Genitourinary: Negative for dysuria, hematuria    Musculoskeletal: No new arthralgias or myalgias   Skin: Negative for rash and itching.    Neurological: Negative for focal weakness and headaches.    Endo/Heme/Allergies: No abnormal bleed/bruise.    Psychiatric/Behavioral: No new depression/anxiety.    Physical Exam:  Vitals: /76   Pulse 90   Temp 36.4 °C  "(97.5 °F)   Resp 14   Ht 1.651 m (5' 5\")   Wt 77.3 kg (170 lb 8.4 oz)   LMP  (LMP Unknown)   SpO2 95%   BMI 28.38 kg/m²     General: Not in acute distress, alert and oriented x 3  HEENT: No pallor, icterus. Oropharynx clear.   Neck: Supple, no palpable masses.  Lymph nodes: No palpable cervical, supraclavicular, axillary or inguinal lymphadenopathy.    CVS: Regular rate and rhythm, no rubs or gallops.  Left-sided port in place without erythema or drainage.  RESP: Clear to auscultate bilaterally, no wheezing or crackles.   ABD: Soft, non tender, non distended, positive bowel sounds, no palpable organomegaly.  Ileostomy in place without drainage or erythema.  EXT: No edema or cyanosis  CNS: Alert and oriented x3, No focal deficits.  Skin- No rash.      Labs:   No visits with results within 1 Week(s) from this visit.   Latest known visit with results is:   Outpatient Infusion Services on 05/07/2019   Component Date Value Ref Range Status   • WBC 05/07/2019 6.5  4.8 - 10.8 K/uL Final   • RBC 05/07/2019 4.63  4.20 - 5.40 M/uL Final   • Hemoglobin 05/07/2019 12.7  12.0 - 16.0 g/dL Final   • Hematocrit 05/07/2019 41.9  37.0 - 47.0 % Final   • MCV 05/07/2019 90.5  81.4 - 97.8 fL Final   • MCH 05/07/2019 27.4  27.0 - 33.0 pg Final   • MCHC 05/07/2019 30.3* 33.6 - 35.0 g/dL Final   • RDW 05/07/2019 59.1* 35.9 - 50.0 fL Final   • Platelet Count 05/07/2019 266  164 - 446 K/uL Final   • MPV 05/07/2019 9.8  9.0 - 12.9 fL Final   • Neutrophils-Polys 05/07/2019 66.90  44.00 - 72.00 % Final   • Lymphocytes 05/07/2019 15.90* 22.00 - 41.00 % Final   • Monocytes 05/07/2019 13.00  0.00 - 13.40 % Final   • Eosinophils 05/07/2019 2.90  0.00 - 6.90 % Final   • Basophils 05/07/2019 0.80  0.00 - 1.80 % Final   • Immature Granulocytes 05/07/2019 0.50  0.00 - 0.90 % Final   • Nucleated RBC 05/07/2019 0.00  /100 WBC Final   • Neutrophils (Absolute) 05/07/2019 4.38  2.00 - 7.15 K/uL Final    Includes immature neutrophils, if present.   • " Lymphs (Absolute) 05/07/2019 1.04  1.00 - 4.80 K/uL Final   • Monos (Absolute) 05/07/2019 0.85  0.00 - 0.85 K/uL Final   • Eos (Absolute) 05/07/2019 0.19  0.00 - 0.51 K/uL Final   • Baso (Absolute) 05/07/2019 0.05  0.00 - 0.12 K/uL Final   • Immature Granulocytes (abs) 05/07/2019 0.03  0.00 - 0.11 K/uL Final   • NRBC (Absolute) 05/07/2019 0.00  K/uL Final   • Sodium 05/07/2019 140  135 - 145 mmol/L Final   • Potassium 05/07/2019 3.9  3.6 - 5.5 mmol/L Final   • Chloride 05/07/2019 107  96 - 112 mmol/L Final   • Co2 05/07/2019 23  20 - 33 mmol/L Final   • Anion Gap 05/07/2019 10.0  0.0 - 11.9 Final   • Glucose 05/07/2019 100* 65 - 99 mg/dL Final   • Bun 05/07/2019 18  8 - 22 mg/dL Final   • Creatinine 05/07/2019 1.04  0.50 - 1.40 mg/dL Final   • Calcium 05/07/2019 9.2  8.5 - 10.5 mg/dL Final   • AST(SGOT) 05/07/2019 23  12 - 45 U/L Final   • ALT(SGPT) 05/07/2019 17  2 - 50 U/L Final   • Alkaline Phosphatase 05/07/2019 149* 30 - 99 U/L Final   • Total Bilirubin 05/07/2019 1.0  0.1 - 1.5 mg/dL Final   • Albumin 05/07/2019 3.7  3.2 - 4.9 g/dL Final   • Total Protein 05/07/2019 6.6  6.0 - 8.2 g/dL Final   • Globulin 05/07/2019 2.9  1.9 - 3.5 g/dL Final   • A-G Ratio 05/07/2019 1.3  g/dL Final   • GFR If  05/07/2019 >60  >60 mL/min/1.73 m 2 Final   • GFR If Non  05/07/2019 51* >60 mL/min/1.73 m 2 Final   • Miscellaneous Lab Result 05/07/2019 SEE NOTE   Final    Comment: Test name                     Result Flag Units  RefIntvl  -------------------------------------------------------------  Carcinoembryonic Antigen         1.9       ng/mL 0.0-3.0  INTERPRETIVE INFORMATION: Carcinoembryonic Antigen  The Roche CEA electrochemiluminescent immunoassay was used.  Results obtained with different test methods or kits cannot be  used interchangeably. Measurement of CEA has been shown to be  clinically relevant in the management of patients with colorectal,  breast, lung, prostatic, pancreatic, and  ovarian carcinomas.  Smokers may have slightly elevated levels of CEA. The CEA assay  value, regardless of level, should not be interpreted as evidence  for the presence or absence of malignant disease and is not  recommended for use as a screening procedure to detect the  presence of cancer in the general population.  Performed by [facility],  [facility] [place] [address] [phone]  [email or web address], Maciel Benjamin MD - Lab. Director         Assessment and Plan:        Metastatic rectal cancer, KRAS mutated      Dyspnea on exertion  -Stable.    History of pulmonary embolus  Anal repair and reconstruction  -Ileostomy in 2012 Dr. Montgomery.  Zuri.    Plan:  -Continue 5-FU.  -Return to clinic in 6 weeks.      She agreed and verbalized  agreement and understanding with the current plan.  I answered all questions and concerns at this time         Please note that this dictation was created using voice recognition software. I have made every reasonable attempt to correct obvious errors, but I expect that there are errors of grammar and possibly content that I did not discover before finalizing the note.      SIGNATURES:  Chauncey Orosco    CC:  Manan Quiñonez M.D.  No ref. provider found

## 2019-05-21 NOTE — PROGRESS NOTES
Patient arrives to [facility] for labs via port prior to MD visit.  L chest port has EMLA cream applied by patient.  Port accessed with low profile 1 inch hernandez needle, flushed and brisk blood return noted.  CBC drawn as ordered and sent to lab.  Pt left accessed for infusion appt later this morning.  Pt left on foot to MD appt.

## 2019-05-21 NOTE — PROGRESS NOTES
Chemotherapy Verification - SECONDARY RN       Height = 165.1 cm  Weight = 76.8 kg  BSA = 1.88 m2       Medication: Adrucil  Dose: 1,920 mg/m2 over 46 hours  Calculated Dose: 3609 mg                             (In mg/m2, AUC, mg/kg)         I confirm that this process was performed independently.

## 2019-05-23 ENCOUNTER — OUTPATIENT INFUSION SERVICES (OUTPATIENT)
Dept: ONCOLOGY | Facility: MEDICAL CENTER | Age: 79
End: 2019-05-23
Attending: INTERNAL MEDICINE
Payer: MEDICARE

## 2019-05-23 VITALS
SYSTOLIC BLOOD PRESSURE: 133 MMHG | DIASTOLIC BLOOD PRESSURE: 64 MMHG | BODY MASS INDEX: 28.54 KG/M2 | OXYGEN SATURATION: 100 % | HEART RATE: 96 BPM | WEIGHT: 171.3 LBS | RESPIRATION RATE: 18 BRPM | TEMPERATURE: 97 F | HEIGHT: 65 IN

## 2019-05-23 DIAGNOSIS — C20 RECTAL CANCER (HCC): ICD-10-CM

## 2019-05-23 DIAGNOSIS — R94.4 DECREASED GFR: ICD-10-CM

## 2019-05-23 PROCEDURE — 700111 HCHG RX REV CODE 636 W/ 250 OVERRIDE (IP)

## 2019-05-23 PROCEDURE — 96523 IRRIG DRUG DELIVERY DEVICE: CPT

## 2019-05-23 RX ADMIN — Medication 500 UNITS: at 11:25

## 2019-05-23 NOTE — PROGRESS NOTES
Patient presents for disconnect from home infusion pump. Pump reads empty with a volume of 0. Pump disconnected and stored in pharmacy. Port flushed per protocol and de-accessed, sterile gauze to site. Patient scheduled for next appointment and released in no acute distress.

## 2019-05-24 ENCOUNTER — PATIENT OUTREACH (OUTPATIENT)
Dept: OTHER | Facility: MEDICAL CENTER | Age: 79
End: 2019-05-24

## 2019-05-24 NOTE — PROGRESS NOTES
On 5/24/2019 patient called this ONN. Patient states she recently received call from [facility] trying to schedule patient for appointment. Patient explained to this ONN that patient was hopeful to stay with the clinic at [facility]. Patient states that she was told by [facility]  than [facility] was closing down cancer services. This ONN explained to patient that with Dr. Hardy transitioning practice to [facility] that patient could not be seen at [facility], as currently there is not a physician to continue to write chemo orders. Patient states that according to her MyChart patient is already scheduled with an oncologist at [facility]. Patient inquiring why she cannot stay with [facility] and see physician listed on her MyChart. This ONN explained that at this time that oncologist will not be employed with [facility] and that [facility] is working to fill position. Patient expressed concern over other [facility] cancer services provided. This ONN explained that patient was still scheduled with [facility] and patient still had access to Navigation and other support services. Patient confirmed she did schedule with [facility]. Patient denies other questions or concerns at this time.This ONN encouraged patient to call this ONN as needed.

## 2019-05-29 NOTE — PROGRESS NOTES
Patient presents ambulatory for pump de access.  Pump verified to be empty and returned to pharmacy.  Port flushed per protocol, brisk blood return noted, de accessed, gauze, and tape applied to site.  Patient left ambulatory in no distress.

## 2019-05-30 RX ORDER — ONDANSETRON 2 MG/ML
4 INJECTION INTRAMUSCULAR; INTRAVENOUS
Status: CANCELLED | OUTPATIENT
Start: 2019-06-04

## 2019-05-30 RX ORDER — DEXAMETHASONE SODIUM PHOSPHATE 4 MG/ML
8 INJECTION, SOLUTION INTRA-ARTICULAR; INTRALESIONAL; INTRAMUSCULAR; INTRAVENOUS; SOFT TISSUE ONCE
Status: CANCELLED | OUTPATIENT
Start: 2019-06-04 | End: 2019-06-05

## 2019-05-30 RX ORDER — ONDANSETRON 8 MG/1
8 TABLET, ORALLY DISINTEGRATING ORAL
Status: CANCELLED | OUTPATIENT
Start: 2019-06-04

## 2019-05-30 RX ORDER — DEXTROSE MONOHYDRATE 50 MG/ML
INJECTION, SOLUTION INTRAVENOUS CONTINUOUS
Status: CANCELLED | OUTPATIENT
Start: 2019-06-04

## 2019-05-30 RX ORDER — PROCHLORPERAZINE MALEATE 10 MG
10 TABLET ORAL EVERY 6 HOURS PRN
Status: CANCELLED | OUTPATIENT
Start: 2019-06-04

## 2019-05-30 RX ORDER — LIDOCAINE HYDROCHLORIDE 10 MG/ML
10 INJECTION, SOLUTION INFILTRATION; PERINEURAL ONCE
Status: CANCELLED | OUTPATIENT
Start: 2019-06-04 | End: 2019-06-04

## 2019-06-03 NOTE — PROGRESS NOTES
"Pharmacy Chemotherapy Verification    Dx: Colon CA        Protocol: 5-FU + Leucovorin      IV over 2 hours on Day 1, followed by    IVP on Day 1, followed by         -- 10/31/17: delete Leucovorin and 5-FU push d/t neutropenia per Dr. Hardy   Fluorouracil   IV continuous infusion daily on Days 1 and 2 ( IV over 46-48 hours)        -- 20% reduction (1920 mg/m²) to be continued starting C28 per C Alsop APRN   14 day cycles for 12 cycles (adjuvant) or until disease progression or unacceptable toxicity  NCCN Guidelines for Colon Cancer. V.2.2015.  Jay GROVER, et al. Eur J Cancer.1999;35(9):1343-7.      Allergies:  Codeine; Oxaliplatin; Pcn; Sulfa drugs; and Tape       /78   Pulse 99   Temp 36.2 °C (97.2 °F) (Temporal)   Resp 18   Ht 1.651 m (5' 5\")   Wt 76.2 kg (167 lb 15.9 oz)   LMP  (LMP Unknown)   SpO2 100%   BMI 27.96 kg/m²  Body surface area is 1.87 meters squared.     Labs reviewed - all within treatment parameters.    Drug Order   (Drug name, dose, route, IV Fluid & volume, frequency, number of doses) Cycle 84 (C76 in [place])   Previous treatment: C83 = 5/21/19   Medication = Fluorouracil (5-FU)   Base Dose = 1920 mg/m²  Calc Dose:Base Dose x 1.87m² = 3590mg  Final Dose = 3590mg   Route = CIVI  Drug Conc = 50 mg/mL  Fluid & Volume = 71.8mL (+ 3 mL overfill)  Admin Duration = CIVI over 46 hrs @ 1.6mL/hr   Via CADD pump for home infusion        <10% difference, OK to treat with final dose     By my signature below, I confirm this process was performed independently with the BSA and all final chemotherapy dosing calculations congruent. I have reviewed the above chemotherapy order and that my calculation of the final dose and BSA (when applicable) corroborate those calculations of the  pharmacist.     DARIN Arroyo, Pharm.D.        "

## 2019-06-04 ENCOUNTER — OFFICE VISIT (OUTPATIENT)
Dept: HEMATOLOGY ONCOLOGY | Facility: MEDICAL CENTER | Age: 79
End: 2019-06-04
Payer: MEDICARE

## 2019-06-04 ENCOUNTER — OUTPATIENT INFUSION SERVICES (OUTPATIENT)
Dept: ONCOLOGY | Facility: MEDICAL CENTER | Age: 79
End: 2019-06-04
Attending: INTERNAL MEDICINE
Payer: MEDICARE

## 2019-06-04 VITALS
HEART RATE: 99 BPM | DIASTOLIC BLOOD PRESSURE: 68 MMHG | BODY MASS INDEX: 27.96 KG/M2 | WEIGHT: 167.99 LBS | TEMPERATURE: 97.5 F | SYSTOLIC BLOOD PRESSURE: 124 MMHG | OXYGEN SATURATION: 98 % | RESPIRATION RATE: 16 BRPM

## 2019-06-04 VITALS
WEIGHT: 167.99 LBS | DIASTOLIC BLOOD PRESSURE: 78 MMHG | HEART RATE: 99 BPM | TEMPERATURE: 97.2 F | OXYGEN SATURATION: 100 % | BODY MASS INDEX: 27.99 KG/M2 | SYSTOLIC BLOOD PRESSURE: 129 MMHG | HEIGHT: 65 IN | RESPIRATION RATE: 18 BRPM

## 2019-06-04 VITALS
HEIGHT: 65 IN | TEMPERATURE: 97.2 F | HEART RATE: 99 BPM | SYSTOLIC BLOOD PRESSURE: 129 MMHG | RESPIRATION RATE: 18 BRPM | BODY MASS INDEX: 27.99 KG/M2 | DIASTOLIC BLOOD PRESSURE: 78 MMHG | OXYGEN SATURATION: 100 % | WEIGHT: 167.99 LBS

## 2019-06-04 DIAGNOSIS — C20 RECTAL CANCER (HCC): ICD-10-CM

## 2019-06-04 DIAGNOSIS — K62.89 RECTAL PAIN: ICD-10-CM

## 2019-06-04 DIAGNOSIS — N28.9 RENAL INSUFFICIENCY: ICD-10-CM

## 2019-06-04 DIAGNOSIS — C20 RECTAL CANCER (HCC): Chronic | ICD-10-CM

## 2019-06-04 DIAGNOSIS — R94.4 DECREASED GFR: ICD-10-CM

## 2019-06-04 DIAGNOSIS — Z08 ENCOUNTER FOR COLONOSCOPY FOLLOWING SURGERY FOR RECTAL CANCER: ICD-10-CM

## 2019-06-04 DIAGNOSIS — Z79.899 ENCOUNTER FOR LONG-TERM CURRENT USE OF HIGH RISK MEDICATION: ICD-10-CM

## 2019-06-04 DIAGNOSIS — Z93.2 ILEOSTOMY IN PLACE (HCC): ICD-10-CM

## 2019-06-04 DIAGNOSIS — Z79.01 CURRENT USE OF LONG TERM ANTICOAGULATION: ICD-10-CM

## 2019-06-04 DIAGNOSIS — Z85.048 ENCOUNTER FOR COLONOSCOPY FOLLOWING SURGERY FOR RECTAL CANCER: ICD-10-CM

## 2019-06-04 DIAGNOSIS — C78.7 SECONDARY MALIGNANT NEOPLASM OF LIVER (HCC): Chronic | ICD-10-CM

## 2019-06-04 DIAGNOSIS — Z86.711 HISTORY OF PULMONARY EMBOLISM: ICD-10-CM

## 2019-06-04 LAB
ALBUMIN SERPL BCP-MCNC: 4 G/DL (ref 3.2–4.9)
ALBUMIN/GLOB SERPL: 1.5 G/DL
ALP SERPL-CCNC: 158 U/L (ref 30–99)
ALT SERPL-CCNC: 19 U/L (ref 2–50)
ANION GAP SERPL CALC-SCNC: 8 MMOL/L (ref 0–11.9)
AST SERPL-CCNC: 25 U/L (ref 12–45)
BASOPHILS # BLD AUTO: 1.3 % (ref 0–1.8)
BASOPHILS # BLD: 0.08 K/UL (ref 0–0.12)
BILIRUB SERPL-MCNC: 0.9 MG/DL (ref 0.1–1.5)
BUN SERPL-MCNC: 17 MG/DL (ref 8–22)
CALCIUM SERPL-MCNC: 9.4 MG/DL (ref 8.5–10.5)
CEA SERPL-MCNC: 1.3 NG/ML (ref 0–3)
CHLORIDE SERPL-SCNC: 106 MMOL/L (ref 96–112)
CO2 SERPL-SCNC: 23 MMOL/L (ref 20–33)
CREAT SERPL-MCNC: 1.07 MG/DL (ref 0.5–1.4)
EOSINOPHIL # BLD AUTO: 0.2 K/UL (ref 0–0.51)
EOSINOPHIL NFR BLD: 3.1 % (ref 0–6.9)
ERYTHROCYTE [DISTWIDTH] IN BLOOD BY AUTOMATED COUNT: 60.8 FL (ref 35.9–50)
GLOBULIN SER CALC-MCNC: 2.7 G/DL (ref 1.9–3.5)
GLUCOSE SERPL-MCNC: 103 MG/DL (ref 65–99)
HCT VFR BLD AUTO: 41.6 % (ref 37–47)
HGB BLD-MCNC: 12.9 G/DL (ref 12–16)
IMM GRANULOCYTES # BLD AUTO: 0.02 K/UL (ref 0–0.11)
IMM GRANULOCYTES NFR BLD AUTO: 0.3 % (ref 0–0.9)
LYMPHOCYTES # BLD AUTO: 1.38 K/UL (ref 1–4.8)
LYMPHOCYTES NFR BLD: 21.7 % (ref 22–41)
MCH RBC QN AUTO: 27.7 PG (ref 27–33)
MCHC RBC AUTO-ENTMCNC: 31 G/DL (ref 33.6–35)
MCV RBC AUTO: 89.5 FL (ref 81.4–97.8)
MONOCYTES # BLD AUTO: 0.88 K/UL (ref 0–0.85)
MONOCYTES NFR BLD AUTO: 13.8 % (ref 0–13.4)
NEUTROPHILS # BLD AUTO: 3.81 K/UL (ref 2–7.15)
NEUTROPHILS NFR BLD: 59.8 % (ref 44–72)
NRBC # BLD AUTO: 0 K/UL
NRBC BLD-RTO: 0 /100 WBC
PLATELET # BLD AUTO: 274 K/UL (ref 164–446)
PMV BLD AUTO: 10.2 FL (ref 9–12.9)
POTASSIUM SERPL-SCNC: 3.9 MMOL/L (ref 3.6–5.5)
PROT SERPL-MCNC: 6.7 G/DL (ref 6–8.2)
RBC # BLD AUTO: 4.65 M/UL (ref 4.2–5.4)
SODIUM SERPL-SCNC: 137 MMOL/L (ref 135–145)
WBC # BLD AUTO: 6.4 K/UL (ref 4.8–10.8)

## 2019-06-04 PROCEDURE — 700111 HCHG RX REV CODE 636 W/ 250 OVERRIDE (IP)

## 2019-06-04 PROCEDURE — 96375 TX/PRO/DX INJ NEW DRUG ADDON: CPT

## 2019-06-04 PROCEDURE — 700111 HCHG RX REV CODE 636 W/ 250 OVERRIDE (IP): Performed by: NURSE PRACTITIONER

## 2019-06-04 PROCEDURE — 82378 CARCINOEMBRYONIC ANTIGEN: CPT

## 2019-06-04 PROCEDURE — 80053 COMPREHEN METABOLIC PANEL: CPT

## 2019-06-04 PROCEDURE — G0498 CHEMO EXTEND IV INFUS W/PUMP: HCPCS

## 2019-06-04 PROCEDURE — 96374 THER/PROPH/DIAG INJ IV PUSH: CPT | Mod: XU

## 2019-06-04 PROCEDURE — A4212 NON CORING NEEDLE OR STYLET: HCPCS

## 2019-06-04 PROCEDURE — 96523 IRRIG DRUG DELIVERY DEVICE: CPT

## 2019-06-04 PROCEDURE — 99214 OFFICE O/P EST MOD 30 MIN: CPT | Performed by: NURSE PRACTITIONER

## 2019-06-04 PROCEDURE — 85025 COMPLETE CBC W/AUTO DIFF WBC: CPT

## 2019-06-04 RX ORDER — LIDOCAINE HYDROCHLORIDE 10 MG/ML
10 INJECTION, SOLUTION INFILTRATION; PERINEURAL ONCE
Status: CANCELLED | OUTPATIENT
Start: 2019-06-18 | End: 2019-06-18

## 2019-06-04 RX ORDER — PROCHLORPERAZINE MALEATE 10 MG
10 TABLET ORAL EVERY 6 HOURS PRN
Status: CANCELLED | OUTPATIENT
Start: 2019-06-18

## 2019-06-04 RX ORDER — ONDANSETRON 2 MG/ML
4 INJECTION INTRAMUSCULAR; INTRAVENOUS
Status: CANCELLED | OUTPATIENT
Start: 2019-06-18

## 2019-06-04 RX ORDER — DEXAMETHASONE SODIUM PHOSPHATE 4 MG/ML
8 INJECTION, SOLUTION INTRA-ARTICULAR; INTRALESIONAL; INTRAMUSCULAR; INTRAVENOUS; SOFT TISSUE ONCE
Status: COMPLETED | OUTPATIENT
Start: 2019-06-04 | End: 2019-06-04

## 2019-06-04 RX ORDER — DEXAMETHASONE SODIUM PHOSPHATE 4 MG/ML
8 INJECTION, SOLUTION INTRA-ARTICULAR; INTRALESIONAL; INTRAMUSCULAR; INTRAVENOUS; SOFT TISSUE ONCE
Status: CANCELLED | OUTPATIENT
Start: 2019-06-18 | End: 2019-06-18

## 2019-06-04 RX ORDER — DEXTROSE MONOHYDRATE 50 MG/ML
INJECTION, SOLUTION INTRAVENOUS CONTINUOUS
Status: CANCELLED | OUTPATIENT
Start: 2019-06-18

## 2019-06-04 RX ORDER — ONDANSETRON 8 MG/1
8 TABLET, ORALLY DISINTEGRATING ORAL
Status: CANCELLED | OUTPATIENT
Start: 2019-06-18

## 2019-06-04 RX ADMIN — FLUOROURACIL 3590 MG: 50 INJECTION, SOLUTION INTRAVENOUS at 09:28

## 2019-06-04 RX ADMIN — HEPARIN 500 UNITS: 100 SYRINGE at 07:15

## 2019-06-04 RX ADMIN — DEXAMETHASONE SODIUM PHOSPHATE 8 MG: 4 INJECTION, SOLUTION INTRAMUSCULAR; INTRAVENOUS at 08:39

## 2019-06-04 ASSESSMENT — ENCOUNTER SYMPTOMS
COUGH: 0
PALPITATIONS: 0
TINGLING: 0
WHEEZING: 0
SHORTNESS OF BREATH: 0
FEVER: 0
DIARRHEA: 0
WEIGHT LOSS: 1
DIZZINESS: 0
INSOMNIA: 0
MYALGIAS: 0
NAUSEA: 0
CHILLS: 0
VOMITING: 0
HEADACHES: 1
CONSTIPATION: 0

## 2019-06-04 ASSESSMENT — PAIN SCALES - GENERAL: PAINLEVEL: NO PAIN

## 2019-06-04 NOTE — PROGRESS NOTES
Pt arrived to [facility] ambulatory, here for labs prior to chemo. Pt with L chest port, EMLA applied by pt at home. EMLA wiped from site and port accessed in sterile field with low profile needle. Port flushed, blood return observed. Labs drawn as ordered. Port flushed and heparin instilled. Port left accessed for planned chemo later this AM. Pt to be seen in 801 this AM and will see Dr. Hardy later this afternoon at [facility]. Pt knows when to return, discharged under self care in no apparent distress.

## 2019-06-04 NOTE — PROGRESS NOTES
Chemotherapy Verification - SECONDARY RN       Height = 165.1 cm  Weight = 76.2 kg BSA = 1.869 m2       Medication: home infusion 5FU  Dose: 1920 mg/m2  Calculated Dose: 3589.2 mg over 46 hours                             (In mg/m2, AUC, mg/kg)       I confirm that this process was performed independently.

## 2019-06-04 NOTE — PROGRESS NOTES
Chemotherapy Verification - PRIMARY RN      Height = 165.1 cm  Weight = 76.2 kg  BSA = 1.87 m2       Medication: Fluorouracil  Dose: 1,920 mg/m2  Calculated Dose: 3,590.4 mg                             (In mg/m2, AUC, mg/kg)     I confirm this process was performed independently with the BSA and all final chemotherapy dosing calculations congruent.  Any discrepancies of 10% or greater have been addressed with the chemotherapy pharmacist. The resolution of the discrepancy has been documented in the EPIC progress notes.

## 2019-06-04 NOTE — PROGRESS NOTES
Pt arrived to [facility], ambulatory, for 5FU. Pt voices no complaints. Port (accessed this morning) flushed per policy, positive blood return noted. Labs reviewed, pt within parameters to treat today. Dexamethasone administered with no s/sx of adverse reaction. 5FU pump connected with no complications. Pt left IS with no s/sx of distress. Follow up appointment confirmed.

## 2019-06-04 NOTE — PROGRESS NOTES
"Pharmacy Chemotherapy Calculation:    Patient Name: Karlene Walters   Dx: Colon CA        Cycle 84 ([place] cycle 76)    Previous treatment: 05/21/19    Protocol: 5-FU + Leucovorin      IV over 2 hours on Day 1, followed by    IVP on Day 1, followed by                               -- 10/31/17: delete Leucovorin and 5-FU push d/t neutropenia per Dr. Hardy   Fluorouracil  continuous infusion over 46-48 hours                              -- 20% reduction (1920 mg/m²) to be continued starting C28 per C Alsop APRN   14 day cycles for 12 cycles (adjuvant) or until disease progression or unacceptable toxicity  NCCN Guidelines for Colon Cancer. V.2.2015.  Jay T, et al. Eur J Cancer.1999;35(9):1343-7.      Allergies: Codeine; Oxaliplatin; Pcn; Sulfa drugs; and Tape       /78   Pulse 99   Temp 36.2 °C (97.2 °F) (Temporal)   Resp 18   Ht 1.651 m (5' 5\")   Wt 76.2 kg (167 lb 15.9 oz)   LMP  (LMP Unknown)   SpO2 100%   BMI 27.96 kg/m²  Body surface area is 1.87 meters squared.    Labs from 6/4/19 reviewed - all within treatment parameters.       Fluorouracil (5-FU) 1920 mg/m² x 1.87 m² = 3590 mg              <10% difference, OK to treat with final dose = 3590 mg CIVI over 46 hrs       Tiffanie Olivier, PharmD, BCOP    "

## 2019-06-04 NOTE — PROGRESS NOTES
"Subjective:      Karlene Walters is a 79 y.o. female who presents for Follow-Up (prechemo) evaluation prior to cycle 76 5-FU for metastatic rectal cancer      HPI   Mrs. Walters presents for evaluation prior to cycle 76 single agent 5-FU for metastatic rectal cancer to the liver. She continues with 20% dose reduction since cycle 21 (12/2016) and discontinuation of bolus 5-FU/LV at cycle 39, due to neutropenia and poor tolerance. She is accompanied by her  for today's visit.    Patient continues tolerating treatment very well.  She has lost 3 pounds since her visit on 5/21 and attributes this to increased activity due to improved weather.  Intermittent trace edema is relieved with Lasix as needed.  She continues with variable ostomy output depending on diet.  She does experience some protrusion of rectal scarring that can be irritating but has not yet followed up with plastic surgery to address issue.  Intermittent headaches are self-limiting.  Intermittent generalized pain, myalgia, arthralgia is relieved with oxycodone as needed.  Patient is otherwise asymptomatic.        Allergies   Allergen Reactions   • Oxaliplatin Anaphylaxis   • Codeine      \"gets drunk\"   • Pcn [Penicillins] Itching   • Sulfa Drugs Itching   • Tape Rash     PAPER TAPE OK         Current Outpatient Prescriptions on File Prior to Visit   Medication Sig Dispense Refill   • raNITidine (ZANTAC) 150 MG Tab Take 1 Tab by mouth 2 Times a Day. 60 Tab 3   • rivaroxaban (XARELTO) 10 MG Tab tablet Take 1 Tab by mouth every day. 90 Tab 1   • Tiotropium Bromide-Olodaterol (STIOLTO RESPIMAT) 2.5-2.5 MCG/ACT Aero Soln Take 2 Puffs by mouth every day. 1 Inhaler 11   • NON SPECIFIED by Intravenous route every 14 days. Chemo 5 FU      • metronidazole (METROCREAM) 0.75 % cream Apply 1 Each to affected area(s) 2 times a day.     • loperamide (IMODIUM) 2 MG Cap Take 2 mg by mouth 4 times a day as needed for Diarrhea.     • cyanocobalamin (VITAMIN B-12) 500 " MCG Tab Take 500 mcg by mouth every day.     • Multiple Vitamins-Minerals (CENTRUM SILVER PO) Take 1 Tab by mouth every day.     • Cholecalciferol (VITAMIN D3) 400 UNITS CAPS Take 1 Cap by mouth every day.     • loratadine (CLARITIN) 10 MG TABS Take 10 mg by mouth every day. Indications: Hayfever     • lidocaine-prilocaine (EMLA) 2.5-2.5 % Cream APPLY A THICK FILM OVER THE PORT ACCESS SITE PRIOR TO ACCESS 30 g 3   • ondansetron (ZOFRAN) 4 MG Tab tablet Take 1 Tab by mouth as needed. 30 Tab 3   • albuterol (PROAIR HFA) 108 (90 Base) MCG/ACT Aero Soln inhalation aerosol Inhale 2 Puffs by mouth every four hours as needed for Shortness of Breath (wheezing). 1 Inhaler 11   • furosemide (LASIX) 40 MG Tab Take 1 Tab by mouth every day. 90 Tab 3   • potassium chloride ER (KLOR-CON) 10 MEQ tablet TAKE ONE TABLET EVERY DAY AS NEEDED 30 Tab 6     No current facility-administered medications on file prior to visit.             Review of Systems   Constitutional: Positive for weight loss (3 lbs down since 5/21). Negative for chills (every so often), fever and malaise/fatigue.   Respiratory: Negative for cough, shortness of breath and wheezing.    Cardiovascular: Negative for chest pain, palpitations and leg swelling (Lasix as needed).   Gastrointestinal: Negative for constipation, diarrhea, nausea and vomiting.        Ostomy; rectal/anal scarring that protrusion at times   Genitourinary: Negative for dysuria.   Musculoskeletal: Negative for myalgias.        Oxy as needed   Neurological: Positive for headaches (intermittent and self limting sharp pain at head - possible migraines (hx of)). Negative for dizziness and tingling.   Psychiatric/Behavioral: The patient does not have insomnia.           Objective:     /68 (BP Location: Right arm, Patient Position: Sitting, BP Cuff Size: Adult long)   Pulse 99   Temp 36.4 °C (97.5 °F) (Temporal)   Resp 16   Wt 76.2 kg (167 lb 15.9 oz)   LMP  (LMP Unknown)   SpO2 98%   BMI  27.96 kg/m²      Physical Exam   Constitutional: She is oriented to person, place, and time. She appears well-developed and well-nourished. No distress.   HENT:   Head: Normocephalic and atraumatic.   Mouth/Throat: Oropharynx is clear and moist. No oropharyngeal exudate.   Eyes: Pupils are equal, round, and reactive to light. Conjunctivae and EOM are normal. Right eye exhibits no discharge. Left eye exhibits no discharge. No scleral icterus.   Neck: Normal range of motion. Neck supple.   Cardiovascular: Normal rate, regular rhythm and normal heart sounds.  Exam reveals no gallop and no friction rub.    No murmur heard.  Pulmonary/Chest: Effort normal and breath sounds normal. No respiratory distress. She has no wheezes.   Abdominal: Soft. Bowel sounds are normal. She exhibits no distension. There is no tenderness.   ostomy   Musculoskeletal: Normal range of motion. She exhibits no edema.   Neurological: She is alert and oriented to person, place, and time.   Skin: Skin is warm and dry. No rash noted. She is not diaphoretic. No erythema. No pallor.   Psychiatric: She has a normal mood and affect. Her behavior is normal.   Vitals reviewed.      [facility] on 06/04/2019   Component Date Value Ref Range Status   • WBC 06/04/2019 6.4  4.8 - 10.8 K/uL Final   • RBC 06/04/2019 4.65  4.20 - 5.40 M/uL Final   • Hemoglobin 06/04/2019 12.9  12.0 - 16.0 g/dL Final   • Hematocrit 06/04/2019 41.6  37.0 - 47.0 % Final   • MCV 06/04/2019 89.5  81.4 - 97.8 fL Final   • MCH 06/04/2019 27.7  27.0 - 33.0 pg Final   • MCHC 06/04/2019 31.0* 33.6 - 35.0 g/dL Final   • RDW 06/04/2019 60.8* 35.9 - 50.0 fL Final   • Platelet Count 06/04/2019 274  164 - 446 K/uL Final   • MPV 06/04/2019 10.2  9.0 - 12.9 fL Final   • Neutrophils-Polys 06/04/2019 59.80  44.00 - 72.00 % Final   • Lymphocytes 06/04/2019 21.70* 22.00 - 41.00 % Final   • Monocytes 06/04/2019 13.80* 0.00 - 13.40 % Final   • Eosinophils 06/04/2019 3.10  0.00 - 6.90  % Final   • Basophils 06/04/2019 1.30  0.00 - 1.80 % Final   • Immature Granulocytes 06/04/2019 0.30  0.00 - 0.90 % Final   • Nucleated RBC 06/04/2019 0.00  /100 WBC Final   • Neutrophils (Absolute) 06/04/2019 3.81  2.00 - 7.15 K/uL Final    Includes immature neutrophils, if present.   • Lymphs (Absolute) 06/04/2019 1.38  1.00 - 4.80 K/uL Final   • Monos (Absolute) 06/04/2019 0.88* 0.00 - 0.85 K/uL Final   • Eos (Absolute) 06/04/2019 0.20  0.00 - 0.51 K/uL Final   • Baso (Absolute) 06/04/2019 0.08  0.00 - 0.12 K/uL Final   • Immature Granulocytes (abs) 06/04/2019 0.02  0.00 - 0.11 K/uL Final   • NRBC (Absolute) 06/04/2019 0.00  K/uL Final   • Sodium 06/04/2019 137  135 - 145 mmol/L Final   • Potassium 06/04/2019 3.9  3.6 - 5.5 mmol/L Final   • Chloride 06/04/2019 106  96 - 112 mmol/L Final   • Co2 06/04/2019 23  20 - 33 mmol/L Final   • Anion Gap 06/04/2019 8.0  0.0 - 11.9 Final   • Glucose 06/04/2019 103* 65 - 99 mg/dL Final   • Bun 06/04/2019 17  8 - 22 mg/dL Final   • Creatinine 06/04/2019 1.07  0.50 - 1.40 mg/dL Final   • Calcium 06/04/2019 9.4  8.5 - 10.5 mg/dL Final   • AST(SGOT) 06/04/2019 25  12 - 45 U/L Final   • ALT(SGPT) 06/04/2019 19  2 - 50 U/L Final   • Alkaline Phosphatase 06/04/2019 158* 30 - 99 U/L Final   • Total Bilirubin 06/04/2019 0.9  0.1 - 1.5 mg/dL Final   • Albumin 06/04/2019 4.0  3.2 - 4.9 g/dL Final   • Total Protein 06/04/2019 6.7  6.0 - 8.2 g/dL Final   • Globulin 06/04/2019 2.7  1.9 - 3.5 g/dL Final   • A-G Ratio 06/04/2019 1.5  g/dL Final   • Carcinoembryonic Antigen 06/04/2019 1.3  0.0 - 3.0 ng/mL Final    Comment: Effective September 17, 2013 the quantitative determination  of Carcinoembryonic Antigen (CEA) will now be performed at  [facility].  The Access CEA paramagnetic  particle chemiluminescent immunoassay is used.  Results  obtained with different test methods or kits cannot be used interchangeably.  Measurement of CEA has been shown to be  clinically relevant  in the management of patients with  colorectal, breast, lung, prostatic, pancreatic, and ovarian  carcinomas.  Smokers may have slightly elevated levels of CEA.     • GFR If  06/04/2019 60  >60 mL/min/1.73 m 2 Final   • GFR If Non  06/04/2019 49* >60 mL/min/1.73 m 2 Final                Assessment/Plan:     1. Rectal cancer (HCC)     2. Secondary malignant neoplasm of liver (HCC)     3. Encounter for colonoscopy following surgery for rectal cancer     4. Encounter for long-term current use of high risk medication     5. Ileostomy in place (HCC)     6. History of pulmonary embolism     7. Current use of long term anticoagulation     8. Renal insufficiency     9. Decreased GFR     10. Rectal pain         1.  PE/anticoagulation: 2.  PE/long-term anticoagulant use: Stable, has tranistion from Xarelot 10 mg daily to Eliquis 2.5 mg BID for indefinite treatment of underlying malignancy.    2.  Liver mets: She is status post Y90 radio embolization of liver lesion on 6/15/2017, without sequelae. S/p overlapping liver ablations x 3 per Dr. ROJAS on 11/15/18 for localized recurrence per PET completed 10/25/18 (following short break in treatment). CEA is improved and stable since ablation in November 2018, will continue to monitor.    3.  Renal insufficiency: Decreased GFR stable for patient, will continue to monitor.    4.  Rectal pain: Patient with history of anal repair/reconstruction completed per Dr. Montgomery for multiple surgeries, with further intervention deferred.  Patient reports periodic discomfort due to protrusion of scar tissue, Dr. ROJAS has encouraged patient to follow-up with Dr. Dubon for further evaluation.    5.  Rectal cancer: Patient continues to tolerate treatment very well.  CBC and CMP have been evaluated and found to be within acceptable limits, except were addressed above.  Patient will proceed with cycle 76 of single agent 5-FU and repeat dosing in 2 weeks.  She will return for  reevaluation prior to cycle 78, sooner as needed.        Patient verbalized desire to continue care at [facility], additional treatment and follow up scheduled accordingly.            The patient verbalized agreement and understanding of current plan. All questions and concerns were addressed at time of visit.    Please note that this dictation was created using voice recognition software. I have made every reasonable attempt to correct obvious errors, but I expect that there are errors of grammar and possibly content that I did not discover before finalizing the note.

## 2019-06-06 ENCOUNTER — OUTPATIENT INFUSION SERVICES (OUTPATIENT)
Dept: ONCOLOGY | Facility: MEDICAL CENTER | Age: 79
End: 2019-06-06
Attending: INTERNAL MEDICINE
Payer: MEDICARE

## 2019-06-06 VITALS
SYSTOLIC BLOOD PRESSURE: 121 MMHG | RESPIRATION RATE: 18 BRPM | HEART RATE: 103 BPM | TEMPERATURE: 97.2 F | DIASTOLIC BLOOD PRESSURE: 67 MMHG | OXYGEN SATURATION: 96 %

## 2019-06-06 PROCEDURE — 700111 HCHG RX REV CODE 636 W/ 250 OVERRIDE (IP)

## 2019-06-06 PROCEDURE — 96523 IRRIG DRUG DELIVERY DEVICE: CPT

## 2019-06-06 RX ADMIN — HEPARIN: 100 SYRINGE at 11:45

## 2019-06-06 NOTE — PROGRESS NOTES
Here for 5 FU pump disconnect after 46 hrs of continuous infusion. See chemotherapy flow sheet for volume / dose administration. Port flushed per protocol, site d-accessed, needle intact compression dressing applied. Patient discharge home in no distress. Next appointment confirmed.

## 2019-06-17 NOTE — PROGRESS NOTES
"Pharmacy Chemotherapy Verification    Dx: Colon CA        Protocol: 5-FU + Leucovorin      IV over 2 hours on Day 1, followed by    IVP on Day 1, followed by         -- 10/31/17: delete Leucovorin and 5-FU push d/t neutropenia per Dr. Hardy   Fluorouracil   IV continuous infusion daily on Days 1 and 2 ( IV over 46-48 hours)        -- 20% reduction (1920 mg/m²) to be continued starting C28 per C Alsop APRN   14 day cycles for 12 cycles (adjuvant) or until disease progression or unacceptable toxicity  NCCN Guidelines for Colon Cancer. V.2.2015.  Jay GROVER, et al. Eur J Cancer.1999;35(9):1343-7.      Allergies:  Codeine; Oxaliplatin; Pcn; Sulfa drugs; and Tape       /77   Pulse (!) 109   Temp 36.1 °C (97 °F) (Temporal)   Resp 18   Ht 1.65 m (5' 4.96\")   Wt 75.7 kg (166 lb 14.2 oz)   LMP  (LMP Unknown)   SpO2 96%   BMI 27.80 kg/m²  Body surface area is 1.86 meters squared.     Labs reviewed - all within treatment parameters.     Drug Order   (Drug name, dose, route, IV Fluid & volume, frequency, number of doses) Cycle 85 (C77 in [place])   Previous treatment: C84 = 6/4/19   Medication = Fluorouracil (5-FU)   Base Dose = 1920 mg/m²  Calc Dose:Base Dose x 1.86m² = 3571mg  Final Dose = 3570mg   Route = CIVI  Drug Conc = 50 mg/mL  Fluid & Volume = 71.4mL (+ 3 mL overfill)  Admin Duration = CIVI over 46 hrs @ 1.6mL/hr   Via CADD pump for home infusion        <10% difference, OK to treat with final dose     By my signature below, I confirm this process was performed independently with the BSA and all final chemotherapy dosing calculations congruent. I have reviewed the above chemotherapy order and that my calculation of the final dose and BSA (when applicable) corroborate those calculations of the  pharmacist.     DARIN Arroyo, Pharm.D.        "

## 2019-06-17 NOTE — PROGRESS NOTES
"Pharmacy Chemotherapy Calculation:    Patient Name: Karlene Walters   Dx: Colon CA        Cycle 85 ([place] cycle 77)    Previous treatment: 6/4/2019    Protocol: 5-FU + Leucovorin      IV over 2 hours on Day 1, followed by    IVP on Day 1, followed by                               -- 10/31/17: delete Leucovorin and 5-FU push d/t neutropenia per Dr. Hardy   Fluorouracil  continuous infusion over 46-48 hours                              -- 20% reduction (1920 mg/m²) to be continued starting C28 per C Alsop APRN   14 day cycles for 12 cycles (adjuvant) or until disease progression or unacceptable toxicity  NCCN Guidelines for Colon Cancer. V.2.2015.  Jay T, et al. Eur J Cancer.1999;35(9):1343-7.      Allergies: Codeine; Oxaliplatin; Pcn; Sulfa drugs; and Tape       /77   Pulse (!) 109   Temp 36.1 °C (97 °F) (Temporal)   Resp 18   Ht 1.65 m (5' 4.96\")   Wt 75.7 kg (166 lb 14.2 oz)   LMP  (LMP Unknown)   SpO2 96%   BMI 27.80 kg/m²  Body surface area is 1.86 meters squared.    LABS:  WBC   Date Value Ref Range Status   06/18/2019 6.6 4.8 - 10.8 K/uL Final     Hemoglobin   Date Value Ref Range Status   06/18/2019 13.1 12.0 - 16.0 g/dL Final     Hematocrit   Date Value Ref Range Status   06/18/2019 42.5 37.0 - 47.0 % Final     Platelet Count   Date Value Ref Range Status   06/18/2019 244 164 - 446 K/uL Final     Neutrophils (Absolute)   Date Value Ref Range Status   06/18/2019 4.05 2.00 - 7.15 K/uL Final     Comment:     Includes immature neutrophils, if present.     Sodium   Date Value Ref Range Status   06/18/2019 139 135 - 145 mmol/L Final     Potassium   Date Value Ref Range Status   06/18/2019 4.1 3.6 - 5.5 mmol/L Final     Bun   Date Value Ref Range Status   06/18/2019 20 8 - 22 mg/dL Final     Creatinine   Date Value Ref Range Status   06/18/2019 1.06 0.50 - 1.40 mg/dL Final     Calcium   Date Value Ref Range Status   06/18/2019 9.3 8.5 - 10.5 mg/dL Final     Magnesium   Date Value Ref Range " Status   03/01/2016 2.0 1.5 - 2.5 mg/dL Final     Phosphorus   Date Value Ref Range Status   10/11/2012 3.6 2.5 - 4.5 mg/dL Final     Glucose   Date Value Ref Range Status   06/18/2019 106 (H) 65 - 99 mg/dL Final     AST(SGOT)   Date Value Ref Range Status   06/18/2019 29 12 - 45 U/L Final     ALT(SGPT)   Date Value Ref Range Status   06/18/2019 22 2 - 50 U/L Final     Alkaline Phosphatase   Date Value Ref Range Status   06/18/2019 166 (H) 30 - 99 U/L Final     Total Bilirubin   Date Value Ref Range Status   06/18/2019 0.7 0.1 - 1.5 mg/dL Final       estimated creatinine clearance is 43.8 mL/min (by C-G formula based on SCr of 1.06 mg/dL).  Impression:  All labs within treatment parameters       Fluorouracil (5-FU) 1920 mg/m² x Body surface area is 1.86 meters squared. m² = 3571.5 mg              <10% difference, OK to treat with final dose = 3570 mg CIVI over 46 hrs       ANA Villatoro, PharmD

## 2019-06-18 ENCOUNTER — OFFICE VISIT (OUTPATIENT)
Dept: CARDIOLOGY | Facility: MEDICAL CENTER | Age: 79
End: 2019-06-18
Payer: MEDICARE

## 2019-06-18 ENCOUNTER — OUTPATIENT INFUSION SERVICES (OUTPATIENT)
Dept: ONCOLOGY | Facility: MEDICAL CENTER | Age: 79
End: 2019-06-18
Attending: INTERNAL MEDICINE
Payer: MEDICARE

## 2019-06-18 VITALS
WEIGHT: 166.89 LBS | DIASTOLIC BLOOD PRESSURE: 77 MMHG | TEMPERATURE: 97 F | OXYGEN SATURATION: 96 % | RESPIRATION RATE: 18 BRPM | HEART RATE: 109 BPM | HEIGHT: 65 IN | BODY MASS INDEX: 27.81 KG/M2 | SYSTOLIC BLOOD PRESSURE: 141 MMHG

## 2019-06-18 VITALS
BODY MASS INDEX: 28.16 KG/M2 | DIASTOLIC BLOOD PRESSURE: 70 MMHG | OXYGEN SATURATION: 93 % | HEIGHT: 65 IN | WEIGHT: 169 LBS | SYSTOLIC BLOOD PRESSURE: 124 MMHG | HEART RATE: 93 BPM

## 2019-06-18 DIAGNOSIS — R94.31 ABNORMAL EKG: ICD-10-CM

## 2019-06-18 DIAGNOSIS — I10 ESSENTIAL HYPERTENSION: ICD-10-CM

## 2019-06-18 DIAGNOSIS — D64.9 ANEMIA, UNSPECIFIED TYPE: ICD-10-CM

## 2019-06-18 DIAGNOSIS — K59.1 FUNCTIONAL DIARRHEA: ICD-10-CM

## 2019-06-18 DIAGNOSIS — R60.0 LOCALIZED EDEMA: ICD-10-CM

## 2019-06-18 DIAGNOSIS — R06.02 SHORTNESS OF BREATH: ICD-10-CM

## 2019-06-18 DIAGNOSIS — J43.9 PULMONARY EMPHYSEMA, UNSPECIFIED EMPHYSEMA TYPE (HCC): Chronic | ICD-10-CM

## 2019-06-18 DIAGNOSIS — R60.9 EDEMA, UNSPECIFIED TYPE: ICD-10-CM

## 2019-06-18 DIAGNOSIS — T45.1X5A CHEMOTHERAPY-INDUCED NEUTROPENIA (HCC): ICD-10-CM

## 2019-06-18 DIAGNOSIS — R94.4 DECREASED GFR: ICD-10-CM

## 2019-06-18 DIAGNOSIS — E78.5 HYPERLIPIDEMIA, UNSPECIFIED HYPERLIPIDEMIA TYPE: ICD-10-CM

## 2019-06-18 DIAGNOSIS — Z79.01 CURRENT USE OF LONG TERM ANTICOAGULATION: ICD-10-CM

## 2019-06-18 DIAGNOSIS — Z86.711 HISTORY OF PULMONARY EMBOLISM: ICD-10-CM

## 2019-06-18 DIAGNOSIS — C20 RECTAL CANCER (HCC): ICD-10-CM

## 2019-06-18 DIAGNOSIS — E86.0 DEHYDRATION: ICD-10-CM

## 2019-06-18 DIAGNOSIS — D70.1 CHEMOTHERAPY-INDUCED NEUTROPENIA (HCC): ICD-10-CM

## 2019-06-18 LAB
ALBUMIN SERPL BCP-MCNC: 3.8 G/DL (ref 3.2–4.9)
ALBUMIN/GLOB SERPL: 1.4 G/DL
ALP SERPL-CCNC: 166 U/L (ref 30–99)
ALT SERPL-CCNC: 22 U/L (ref 2–50)
ANION GAP SERPL CALC-SCNC: 11 MMOL/L (ref 0–11.9)
AST SERPL-CCNC: 29 U/L (ref 12–45)
BASOPHILS # BLD AUTO: 1.2 % (ref 0–1.8)
BASOPHILS # BLD: 0.08 K/UL (ref 0–0.12)
BILIRUB SERPL-MCNC: 0.7 MG/DL (ref 0.1–1.5)
BUN SERPL-MCNC: 20 MG/DL (ref 8–22)
CALCIUM SERPL-MCNC: 9.3 MG/DL (ref 8.5–10.5)
CHLORIDE SERPL-SCNC: 108 MMOL/L (ref 96–112)
CO2 SERPL-SCNC: 20 MMOL/L (ref 20–33)
CREAT SERPL-MCNC: 1.06 MG/DL (ref 0.5–1.4)
EOSINOPHIL # BLD AUTO: 0.26 K/UL (ref 0–0.51)
EOSINOPHIL NFR BLD: 3.9 % (ref 0–6.9)
ERYTHROCYTE [DISTWIDTH] IN BLOOD BY AUTOMATED COUNT: 63.7 FL (ref 35.9–50)
GLOBULIN SER CALC-MCNC: 2.8 G/DL (ref 1.9–3.5)
GLUCOSE SERPL-MCNC: 106 MG/DL (ref 65–99)
HCT VFR BLD AUTO: 42.5 % (ref 37–47)
HGB BLD-MCNC: 13.1 G/DL (ref 12–16)
IMM GRANULOCYTES # BLD AUTO: 0.04 K/UL (ref 0–0.11)
IMM GRANULOCYTES NFR BLD AUTO: 0.6 % (ref 0–0.9)
LYMPHOCYTES # BLD AUTO: 1.32 K/UL (ref 1–4.8)
LYMPHOCYTES NFR BLD: 20 % (ref 22–41)
MCH RBC QN AUTO: 28.1 PG (ref 27–33)
MCHC RBC AUTO-ENTMCNC: 30.8 G/DL (ref 33.6–35)
MCV RBC AUTO: 91 FL (ref 81.4–97.8)
MONOCYTES # BLD AUTO: 0.84 K/UL (ref 0–0.85)
MONOCYTES NFR BLD AUTO: 12.7 % (ref 0–13.4)
NEUTROPHILS # BLD AUTO: 4.05 K/UL (ref 2–7.15)
NEUTROPHILS NFR BLD: 61.6 % (ref 44–72)
NRBC # BLD AUTO: 0 K/UL
NRBC BLD-RTO: 0 /100 WBC
PLATELET # BLD AUTO: 244 K/UL (ref 164–446)
PMV BLD AUTO: 10 FL (ref 9–12.9)
POTASSIUM SERPL-SCNC: 4.1 MMOL/L (ref 3.6–5.5)
PROT SERPL-MCNC: 6.6 G/DL (ref 6–8.2)
RBC # BLD AUTO: 4.67 M/UL (ref 4.2–5.4)
SODIUM SERPL-SCNC: 139 MMOL/L (ref 135–145)
WBC # BLD AUTO: 6.6 K/UL (ref 4.8–10.8)

## 2019-06-18 PROCEDURE — 99214 OFFICE O/P EST MOD 30 MIN: CPT | Performed by: INTERNAL MEDICINE

## 2019-06-18 PROCEDURE — 700111 HCHG RX REV CODE 636 W/ 250 OVERRIDE (IP): Performed by: NURSE PRACTITIONER

## 2019-06-18 PROCEDURE — 306780 HCHG STAT FOR TRANSFUSION PER CASE

## 2019-06-18 PROCEDURE — A4212 NON CORING NEEDLE OR STYLET: HCPCS

## 2019-06-18 PROCEDURE — 700111 HCHG RX REV CODE 636 W/ 250 OVERRIDE (IP)

## 2019-06-18 PROCEDURE — 96374 THER/PROPH/DIAG INJ IV PUSH: CPT | Mod: XU

## 2019-06-18 PROCEDURE — 96375 TX/PRO/DX INJ NEW DRUG ADDON: CPT

## 2019-06-18 PROCEDURE — 85025 COMPLETE CBC W/AUTO DIFF WBC: CPT

## 2019-06-18 PROCEDURE — G0498 CHEMO EXTEND IV INFUS W/PUMP: HCPCS

## 2019-06-18 PROCEDURE — 80053 COMPREHEN METABOLIC PANEL: CPT

## 2019-06-18 RX ORDER — POTASSIUM CHLORIDE 750 MG/1
10 TABLET, FILM COATED, EXTENDED RELEASE ORAL DAILY
Qty: 90 TAB | Refills: 3 | Status: SHIPPED | OUTPATIENT
Start: 2019-06-18 | End: 2019-08-28

## 2019-06-18 RX ORDER — FUROSEMIDE 40 MG/1
40 TABLET ORAL DAILY
Qty: 90 TAB | Refills: 3 | Status: SHIPPED | OUTPATIENT
Start: 2019-06-18 | End: 2020-03-18 | Stop reason: SDUPTHER

## 2019-06-18 RX ORDER — DEXAMETHASONE SODIUM PHOSPHATE 4 MG/ML
8 INJECTION, SOLUTION INTRA-ARTICULAR; INTRALESIONAL; INTRAMUSCULAR; INTRAVENOUS; SOFT TISSUE ONCE
Status: COMPLETED | OUTPATIENT
Start: 2019-06-18 | End: 2019-06-18

## 2019-06-18 RX ADMIN — FLUOROURACIL 3570 MG: 50 INJECTION, SOLUTION INTRAVENOUS at 10:55

## 2019-06-18 RX ADMIN — DEXAMETHASONE SODIUM PHOSPHATE 8 MG: 4 INJECTION, SOLUTION INTRA-ARTICULAR; INTRALESIONAL; INTRAMUSCULAR; INTRAVENOUS; SOFT TISSUE at 10:38

## 2019-06-18 ASSESSMENT — ENCOUNTER SYMPTOMS
CARDIOVASCULAR NEGATIVE: 1
HEMOPTYSIS: 0
GASTROINTESTINAL NEGATIVE: 1
STRIDOR: 0
EYES NEGATIVE: 1
MUSCULOSKELETAL NEGATIVE: 1
SORE THROAT: 0
WHEEZING: 0
NEUROLOGICAL NEGATIVE: 1
WEAKNESS: 0
CLAUDICATION: 0
PALPITATIONS: 0
SPUTUM PRODUCTION: 0
LOSS OF CONSCIOUSNESS: 0
DIZZINESS: 0
CHILLS: 0
FEVER: 0
PND: 0
ORTHOPNEA: 0
COUGH: 0
SHORTNESS OF BREATH: 0
BRUISES/BLEEDS EASILY: 0
CONSTITUTIONAL NEGATIVE: 1
RESPIRATORY NEGATIVE: 1

## 2019-06-18 NOTE — PROGRESS NOTES
Pt arrived to [facility] ambulatory, here for C77 5FU CADD pump. Pt with L chest port, EMLA applied at home by pt. EMLA wiped from site and port accessed in sterile field. Port flushed, blood return observed. Labs drawn as ordered. Results reviewed and WNL for chemo to proceed. Premed given as ordered. Pt connected to 5FU pump, settings reviewed by 2 RNs. Pt knows to return in 2 days. Discharged home under care of  in no apparent distress.

## 2019-06-18 NOTE — PROGRESS NOTES
Chemotherapy Verification - SECONDARY RN       Height = 165 cm  Weight = 75.7 kg  BSA = 1.86 m2       Medication: Home infusion 5FU  Dose: 1920 mg/m2  Calculated Dose: 3576 mg over 46 hours                            (In mg/m2, AUC, mg/kg)       I confirm that this process was performed independently.

## 2019-06-18 NOTE — PROGRESS NOTES
Chief Complaint   Patient presents with   • Follow-Up       Subjective:   Karlene Walters is a 79 y.o. female who presents today as a follow-up for her PE DVT tachycardia and chronic shortness of breath.  She is currently undergoing an infusion of her 5-FU.  She has been having leg cramps.  She is been taking more potassium for this.  She is also back on the Lasix daily.  She has had no recurrence of her lower extremity edema.  Her shortness of breath which is a chronic now for about 3 years is stable.  She is starting to lose her appetite because nothing tastes good.  Otherwise her blood pressure is controlled.  She does not have any chest pain palpitations or PND.    Past Medical History:   Diagnosis Date   • Arrhythmia     occasional   • Blood clotting disorder (HCC) 08/2015    bilat PE    • Blood transfusion 1957   • Breath shortness     no O2 with moderate exertion   • Cancer (HCC) 09/2012    rectal cancer,  Liver CA-2015   • CATARACT     removed b/l   • Chemotherapy-induced neutropenia (HCC) 9/28/2016   • Chickenpox    • Colon cancer (HCC)    • Dental disorder     dentures UPPERS AND LOWERS   • Elevated alkaline phosphatase level 11/15/2017   • Emphysema of lung (HCC)    • Fall    • Guyanese measles    • Heart murmur     as a child   • Hemorrhagic disorder (HCC)     on Xarelto    • Hypercholesteremia    • Hypertension     no meds currently    • Ileostomy in place (HCC)    • Indigestion    • Influenza    • Lightheadedness 9/17/2015   • Mumps    • Obstruction     ileostomy   • Other specified symptom associated with female genital organs     HYSTERECTOMY 1993   • Pneumonia 05/2017    tx'd with antibx   • Pulmonary embolism (HCC)    • 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)    • Renal insufficiency 3/14/2016   • Routine general medical examination at a health care facility 3/14/2016    3/14/16   • Seasonal allergies    • Swelling of both ankles     Lasix PRN   • Tonsillitis      Past Surgical History:    Procedure Laterality Date   • HEPATIC ABLATION LAPAROSCOPIC  11/15/2018    Procedure: HEPATIC ABLATION LAPAROSCOPIC.- SEGMENT 7/8;  Surgeon: Kit West M.D.;  Location: SURGERY [facility];  Service: General   • COLONOSCOPY WITH BIOPSY  8/23/2015    Procedure: COLONOSCOPY WITH BIOPSY;  Surgeon: Wilbur Glasgow M.D.;  Location: ENDOSCOPY [facility];  Service:    • HEPATIC ABLATION LAPAROSCOPIC  7/6/2015    Procedure: HEPATIC ABLATION LAPAROSCOPIC.;  Surgeon: Kit West M.D.;  Location: SURGERY [facility];  Service:    • CATH PLACEMENT Left 7/6/2015    Procedure: CATH PLACEMENT CEPHALIC POWERPORT;  Surgeon: Kit West M.D.;  Location: SURGERY [facility];  Service:    • FISTULA IN ANO REPAIR  1/28/2015    Performed by Kolton Montgomery M.D. at [facility]   • PERINEAL PROCEDURE  1/28/2015    Performed by Kolton Montgomery M.D. at [facility]   • FISTULA IN ANO REPAIR  10/15/2014    Performed by Kolton Montgomery M.D. at [facility]   • PERINEAL PROCEDURE  10/15/2014    Performed by Kolton Montgomery M.D. at SURGERY [facility]   • IRRIGATION & DEBRIDEMENT GENERAL  2/19/2014    Performed by Kolton Montgomery M.D. at [facility]   • FLAP GRAFT  2/19/2014    Performed by Kolton Montgomery M.D. at [facility]   • PERINEAL PROCEDURE  12/18/2013    Performed by Kolton Montgomery M.D. at [facility]   • MYOCUTANEOUS FLAP  12/18/2013    Performed by Kolton Montgomery M.D. at [facility]   • IRRIGATION & DEBRIDEMENT GENERAL  10/23/2013    Performed by Kolton Montgomery M.D. at [facility]   • FISTULA IN ANO REPAIR  9/4/2013    Performed by Kolton Montgomery M.D. at [facility]   • FLAP ROTATION  9/4/2013    Performed by Kolton Montgomery M.D. at [facility]   • RECOVERY  8/26/2013    Performed by Cath-Recovery Surgery at SURGERY SAME DAY [facility] ORS   • BIOPSY GENERAL  7/17/2013     Performed by Kolton Montgomery M.D. at SURGERY [facility]   • PROCTOSCOPY  7/17/2013    Performed by Kolton Montgomery M.D. at SURGERY [facility]   • FISTULA IN ANO REPAIR  2/27/2013    Performed by Kolton Montgomery M.D. at SURGERY [facility]   • SIGMOIDOSCOPY  2/27/2013    Performed by Kolton Montgomery M.D. at SURGERY [facility]   • LAPAROSCOPY  10/8/2012    Performed by Kolton Montgomery M.D. at SURGERY [facility]   • ILEO LOOP DIVERSION  10/8/2012    Performed by Kolton Montgomery M.D. at SURGERY [facility]   • COLECTOMY  9/26/2012    Performed by Kolton Montgomery M.D. at [facility]   • COLONOSCOPY FLEX W/ENDO US  9/20/2012    Performed by Harshil Bolton M.D. at [facility]   • OTHER  2009    left eye torn retina repair   • CATARACT EXTRACTION WITH IOL  2004    bilateral   • ABDOMINAL HYSTERECTOMY TOTAL  1983   • CYST EXCISION  1972    ovarian   • COLON RESECTION     • EYE SURGERY      cataracts   • HYSTERECTOMY LAPAROSCOPY     • PB REMV 2ND CATARACT,CORN-SCLER SECTN     • TONSILLECTOMY       Family History   Problem Relation Age of Onset   • Heart Disease Mother    • Heart Failure Mother    • Hypertension Mother    • Hyperlipidemia Mother    • Cancer Mother    • Cancer Maternal Aunt 60        breast ca   • Lung Disease Brother         COPD/Emphysema/Smoker   • Cancer Brother         prostate cancer   • Alcohol/Drug Brother         Alcohol   • Kidney Disease Father         renal failure     Social History     Social History   • Marital status:      Spouse name: N/A   • Number of children: N/A   • Years of education: N/A     Occupational History   • Not on file.     Social History Main Topics   • Smoking status: Never Smoker   • Smokeless tobacco: Never Used   • Alcohol use No      Comment: rare   • Drug use: No   • Sexual activity: No     Other Topics Concern   • Not on file     Social History Narrative   • No narrative on file     Allergies   Allergen Reactions   •  "Oxaliplatin Anaphylaxis   • Codeine      \"gets drunk\"   • Pcn [Penicillins] Itching   • Sulfa Drugs Itching   • Tape Rash     PAPER TAPE OK     Outpatient Encounter Prescriptions as of 6/18/2019   Medication Sig Dispense Refill   • furosemide (LASIX) 40 MG Tab Take 1 Tab by mouth every day. 90 Tab 3   • potassium chloride ER (KLOR-CON) 10 MEQ tablet Take 1 Tab by mouth every day. 90 Tab 3   • raNITidine (ZANTAC) 150 MG Tab Take 1 Tab by mouth 2 Times a Day. 60 Tab 3   • rivaroxaban (XARELTO) 10 MG Tab tablet Take 1 Tab by mouth every day. 90 Tab 1   • lidocaine-prilocaine (EMLA) 2.5-2.5 % Cream APPLY A THICK FILM OVER THE PORT ACCESS SITE PRIOR TO ACCESS 30 g 3   • ondansetron (ZOFRAN) 4 MG Tab tablet Take 1 Tab by mouth as needed. 30 Tab 3   • Tiotropium Bromide-Olodaterol (STIOLTO RESPIMAT) 2.5-2.5 MCG/ACT Aero Soln Take 2 Puffs by mouth every day. 1 Inhaler 11   • albuterol (PROAIR HFA) 108 (90 Base) MCG/ACT Aero Soln inhalation aerosol Inhale 2 Puffs by mouth every four hours as needed for Shortness of Breath (wheezing). 1 Inhaler 11   • metronidazole (METROCREAM) 0.75 % cream Apply 1 Each to affected area(s) 2 times a day.     • loperamide (IMODIUM) 2 MG Cap Take 2 mg by mouth 4 times a day as needed for Diarrhea.     • cyanocobalamin (VITAMIN B-12) 500 MCG Tab Take 500 mcg by mouth every day.     • Multiple Vitamins-Minerals (CENTRUM SILVER PO) Take 1 Tab by mouth every day.     • Cholecalciferol (VITAMIN D3) 400 UNITS CAPS Take 1 Cap by mouth every day.     • loratadine (CLARITIN) 10 MG TABS Take 10 mg by mouth every day. Indications: Hayfever     • [DISCONTINUED] furosemide (LASIX) 40 MG Tab Take 1 Tab by mouth every day. 90 Tab 3   • [DISCONTINUED] potassium chloride ER (KLOR-CON) 10 MEQ tablet TAKE ONE TABLET EVERY DAY AS NEEDED 30 Tab 6     No facility-administered encounter medications on file as of 6/18/2019.      Review of Systems   Constitutional: Negative.  Negative for chills, fever and " "malaise/fatigue.   HENT: Negative.  Negative for sore throat.    Eyes: Negative.    Respiratory: Negative.  Negative for cough, hemoptysis, sputum production, shortness of breath, wheezing and stridor.    Cardiovascular: Negative.  Negative for chest pain, palpitations, orthopnea, claudication, leg swelling and PND.   Gastrointestinal: Negative.    Genitourinary: Negative.    Musculoskeletal: Negative.    Skin: Negative.    Neurological: Negative.  Negative for dizziness, loss of consciousness and weakness.   Endo/Heme/Allergies: Negative.  Does not bruise/bleed easily.   All other systems reviewed and are negative.       Objective:   /70 (BP Location: Left arm, Patient Position: Sitting, BP Cuff Size: Adult)   Pulse 93   Ht 1.65 m (5' 4.96\")   Wt 76.7 kg (169 lb)   LMP  (LMP Unknown)   SpO2 93%   BMI 28.16 kg/m²     Physical Exam   Constitutional: She appears well-developed and well-nourished. No distress.   HENT:   Head: Normocephalic and atraumatic.   Right Ear: External ear normal.   Left Ear: External ear normal.   Nose: Nose normal.   Mouth/Throat: No oropharyngeal exudate.   Eyes: Pupils are equal, round, and reactive to light. Conjunctivae and EOM are normal. Right eye exhibits no discharge. Left eye exhibits no discharge. No scleral icterus.   Neck: Neck supple. No JVD present.   Cardiovascular: Normal rate, regular rhythm and intact distal pulses.  Exam reveals no gallop and no friction rub.    No murmur heard.  Pulmonary/Chest: Effort normal. No stridor. No respiratory distress. She has no wheezes. She has no rales. She exhibits no tenderness.   Abdominal: Soft. She exhibits no distension. There is no guarding.   Musculoskeletal: Normal range of motion. She exhibits no edema, tenderness or deformity.   Neurological: She is alert. She has normal reflexes. She displays normal reflexes. No cranial nerve deficit. She exhibits normal muscle tone. Coordination normal.   Skin: Skin is warm and dry. " No rash noted. She is not diaphoretic. No erythema. No pallor.   Psychiatric: She has a normal mood and affect. Her behavior is normal. Judgment and thought content normal.   Nursing note and vitals reviewed.      Assessment:     1. Abnormal EKG     2. Chemotherapy-induced neutropenia (HCC)     3. Current use of long term anticoagulation     4. Dehydration     5. Functional diarrhea     6. Hyperlipidemia, unspecified hyperlipidemia type     7. Essential hypertension     8. History of pulmonary embolism  furosemide (LASIX) 40 MG Tab   9. Pulmonary emphysema, unspecified emphysema type (HCC)     10. Shortness of breath  potassium chloride ER (KLOR-CON) 10 MEQ tablet   11. Anemia, unspecified type  furosemide (LASIX) 40 MG Tab   12. Localized edema  furosemide (LASIX) 40 MG Tab   13. Edema, unspecified type  potassium chloride ER (KLOR-CON) 10 MEQ tablet       Medical Decision Making:  Today's Assessment / Status / Plan:     79-year-old female with resolved orthostatic tachycardia and chronic shortness of breath with lower extremity edema.  I refilled her Lasix and her potassium now for 90 days for 1 year.  I think the remainder of her risk factors are as well manages we will get.  We will see her back in 3 months.    1. Orthostatic tachycardia  - resolved     2. Shortness of breath with exertion and PEs, normal echo  - cont Rivaroxiban 10     3. LE Edema    - lasix 40 daily    - KCL 10 mEq daily

## 2019-06-18 NOTE — PROGRESS NOTES
Chemotherapy Verification - PRIMARY RN    C77 - C85 in total    Height = 165 cm  Weight = 75.7 kg  BSA = 1.86 m2       Medication: Fluorouracil (5FU) CADD pump  Dose: 1920 mg/m2  Calculated Dose: 3571.2 mg over 46 hours                              I confirm this process was performed independently with the BSA and all final chemotherapy dosing calculations congruent.  Any discrepancies of 10% or greater have been addressed with the chemotherapy pharmacist. The resolution of the discrepancy has been documented in the EPIC progress notes.

## 2019-06-20 ENCOUNTER — OUTPATIENT INFUSION SERVICES (OUTPATIENT)
Dept: ONCOLOGY | Facility: MEDICAL CENTER | Age: 79
End: 2019-06-20
Attending: INTERNAL MEDICINE
Payer: MEDICARE

## 2019-06-20 VITALS
OXYGEN SATURATION: 98 % | SYSTOLIC BLOOD PRESSURE: 120 MMHG | HEART RATE: 96 BPM | TEMPERATURE: 97 F | DIASTOLIC BLOOD PRESSURE: 68 MMHG | RESPIRATION RATE: 18 BRPM

## 2019-06-20 DIAGNOSIS — C20 RECTAL CANCER (HCC): ICD-10-CM

## 2019-06-20 DIAGNOSIS — R94.4 DECREASED GFR: ICD-10-CM

## 2019-06-20 PROCEDURE — 700111 HCHG RX REV CODE 636 W/ 250 OVERRIDE (IP)

## 2019-06-20 PROCEDURE — 96523 IRRIG DRUG DELIVERY DEVICE: CPT

## 2019-06-20 RX ADMIN — HEPARIN 500 UNITS: 100 SYRINGE at 10:39

## 2019-06-20 NOTE — PROGRESS NOTES
Patient arrived to [facility] for C77D3 5FU CADD pump de-access.  Pump stopped with 0ml remaining in reservoir, 71.8 mLs administered.  Disconnected pump, Port flushed per protocol with brisk blood return noted, heparinized, de-accessed and gauze dressing applied.  Pump cleaned and placed in pharmacy drawer.  Confirmed pt's next appt. Pt discharged home in [facility].

## 2019-07-02 ENCOUNTER — OUTPATIENT INFUSION SERVICES (OUTPATIENT)
Dept: ONCOLOGY | Facility: MEDICAL CENTER | Age: 79
End: 2019-07-02
Attending: INTERNAL MEDICINE
Payer: MEDICARE

## 2019-07-02 ENCOUNTER — OFFICE VISIT (OUTPATIENT)
Dept: HEMATOLOGY ONCOLOGY | Facility: MEDICAL CENTER | Age: 79
End: 2019-07-02
Payer: MEDICARE

## 2019-07-02 VITALS
HEIGHT: 65 IN | OXYGEN SATURATION: 100 % | HEART RATE: 61 BPM | SYSTOLIC BLOOD PRESSURE: 122 MMHG | RESPIRATION RATE: 18 BRPM | WEIGHT: 166.23 LBS | TEMPERATURE: 97 F | BODY MASS INDEX: 27.7 KG/M2 | DIASTOLIC BLOOD PRESSURE: 80 MMHG

## 2019-07-02 VITALS
TEMPERATURE: 97.8 F | DIASTOLIC BLOOD PRESSURE: 58 MMHG | WEIGHT: 165.24 LBS | HEART RATE: 95 BPM | BODY MASS INDEX: 27.5 KG/M2 | SYSTOLIC BLOOD PRESSURE: 112 MMHG | RESPIRATION RATE: 16 BRPM | OXYGEN SATURATION: 97 %

## 2019-07-02 VITALS
HEIGHT: 65 IN | OXYGEN SATURATION: 100 % | DIASTOLIC BLOOD PRESSURE: 80 MMHG | BODY MASS INDEX: 27.7 KG/M2 | RESPIRATION RATE: 18 BRPM | TEMPERATURE: 97 F | WEIGHT: 166.23 LBS | HEART RATE: 61 BPM | SYSTOLIC BLOOD PRESSURE: 122 MMHG

## 2019-07-02 DIAGNOSIS — R94.4 DECREASED GFR: ICD-10-CM

## 2019-07-02 DIAGNOSIS — C78.7 SECONDARY MALIGNANT NEOPLASM OF LIVER (HCC): Chronic | ICD-10-CM

## 2019-07-02 DIAGNOSIS — C20 RECTAL CANCER (HCC): Chronic | ICD-10-CM

## 2019-07-02 DIAGNOSIS — C20 RECTAL CANCER (HCC): ICD-10-CM

## 2019-07-02 LAB
ALBUMIN SERPL BCP-MCNC: 4 G/DL (ref 3.2–4.9)
ALBUMIN/GLOB SERPL: 1.3 G/DL
ALP SERPL-CCNC: 168 U/L (ref 30–99)
ALT SERPL-CCNC: 23 U/L (ref 2–50)
ANION GAP SERPL CALC-SCNC: 10 MMOL/L (ref 0–11.9)
AST SERPL-CCNC: 25 U/L (ref 12–45)
BASOPHILS # BLD AUTO: 1.3 % (ref 0–1.8)
BASOPHILS # BLD: 0.09 K/UL (ref 0–0.12)
BILIRUB SERPL-MCNC: 0.7 MG/DL (ref 0.1–1.5)
BUN SERPL-MCNC: 21 MG/DL (ref 8–22)
CALCIUM SERPL-MCNC: 9.7 MG/DL (ref 8.5–10.5)
CEA SERPL-MCNC: 1.3 NG/ML (ref 0–3)
CHLORIDE SERPL-SCNC: 106 MMOL/L (ref 96–112)
CO2 SERPL-SCNC: 22 MMOL/L (ref 20–33)
CREAT SERPL-MCNC: 1.18 MG/DL (ref 0.5–1.4)
EOSINOPHIL # BLD AUTO: 0.28 K/UL (ref 0–0.51)
EOSINOPHIL NFR BLD: 4 % (ref 0–6.9)
ERYTHROCYTE [DISTWIDTH] IN BLOOD BY AUTOMATED COUNT: 64.8 FL (ref 35.9–50)
GLOBULIN SER CALC-MCNC: 3.1 G/DL (ref 1.9–3.5)
GLUCOSE SERPL-MCNC: 109 MG/DL (ref 65–99)
HCT VFR BLD AUTO: 43.6 % (ref 37–47)
HGB BLD-MCNC: 13.2 G/DL (ref 12–16)
IMM GRANULOCYTES # BLD AUTO: 0.04 K/UL (ref 0–0.11)
IMM GRANULOCYTES NFR BLD AUTO: 0.6 % (ref 0–0.9)
LYMPHOCYTES # BLD AUTO: 1.48 K/UL (ref 1–4.8)
LYMPHOCYTES NFR BLD: 21.1 % (ref 22–41)
MCH RBC QN AUTO: 27.9 PG (ref 27–33)
MCHC RBC AUTO-ENTMCNC: 30.3 G/DL (ref 33.6–35)
MCV RBC AUTO: 92.2 FL (ref 81.4–97.8)
MONOCYTES # BLD AUTO: 0.94 K/UL (ref 0–0.85)
MONOCYTES NFR BLD AUTO: 13.4 % (ref 0–13.4)
NEUTROPHILS # BLD AUTO: 4.2 K/UL (ref 2–7.15)
NEUTROPHILS NFR BLD: 59.6 % (ref 44–72)
NRBC # BLD AUTO: 0 K/UL
NRBC BLD-RTO: 0 /100 WBC
PLATELET # BLD AUTO: 249 K/UL (ref 164–446)
PMV BLD AUTO: 9.9 FL (ref 9–12.9)
POTASSIUM SERPL-SCNC: 3.9 MMOL/L (ref 3.6–5.5)
PROT SERPL-MCNC: 7.1 G/DL (ref 6–8.2)
RBC # BLD AUTO: 4.73 M/UL (ref 4.2–5.4)
SODIUM SERPL-SCNC: 138 MMOL/L (ref 135–145)
WBC # BLD AUTO: 7 K/UL (ref 4.8–10.8)

## 2019-07-02 PROCEDURE — 96374 THER/PROPH/DIAG INJ IV PUSH: CPT | Mod: XU

## 2019-07-02 PROCEDURE — 85025 COMPLETE CBC W/AUTO DIFF WBC: CPT

## 2019-07-02 PROCEDURE — 700111 HCHG RX REV CODE 636 W/ 250 OVERRIDE (IP)

## 2019-07-02 PROCEDURE — 82378 CARCINOEMBRYONIC ANTIGEN: CPT

## 2019-07-02 PROCEDURE — A4212 NON CORING NEEDLE OR STYLET: HCPCS

## 2019-07-02 PROCEDURE — 99213 OFFICE O/P EST LOW 20 MIN: CPT | Performed by: NURSE PRACTITIONER

## 2019-07-02 PROCEDURE — G0498 CHEMO EXTEND IV INFUS W/PUMP: HCPCS

## 2019-07-02 PROCEDURE — 80053 COMPREHEN METABOLIC PANEL: CPT

## 2019-07-02 PROCEDURE — 700111 HCHG RX REV CODE 636 W/ 250 OVERRIDE (IP): Performed by: NURSE PRACTITIONER

## 2019-07-02 RX ORDER — DEXAMETHASONE SODIUM PHOSPHATE 4 MG/ML
8 INJECTION, SOLUTION INTRA-ARTICULAR; INTRALESIONAL; INTRAMUSCULAR; INTRAVENOUS; SOFT TISSUE ONCE
Status: CANCELLED | OUTPATIENT
Start: 2019-07-02 | End: 2019-07-02

## 2019-07-02 RX ORDER — DEXAMETHASONE SODIUM PHOSPHATE 4 MG/ML
8 INJECTION, SOLUTION INTRA-ARTICULAR; INTRALESIONAL; INTRAMUSCULAR; INTRAVENOUS; SOFT TISSUE ONCE
Status: COMPLETED | OUTPATIENT
Start: 2019-07-02 | End: 2019-07-02

## 2019-07-02 RX ORDER — DEXTROSE MONOHYDRATE 50 MG/ML
INJECTION, SOLUTION INTRAVENOUS CONTINUOUS
Status: CANCELLED | OUTPATIENT
Start: 2019-07-02

## 2019-07-02 RX ORDER — LIDOCAINE HYDROCHLORIDE 10 MG/ML
10 INJECTION, SOLUTION INFILTRATION; PERINEURAL ONCE
Status: CANCELLED | OUTPATIENT
Start: 2019-07-02 | End: 2019-07-02

## 2019-07-02 RX ORDER — ONDANSETRON 2 MG/ML
4 INJECTION INTRAMUSCULAR; INTRAVENOUS
Status: CANCELLED | OUTPATIENT
Start: 2019-07-02

## 2019-07-02 RX ORDER — PROCHLORPERAZINE MALEATE 10 MG
10 TABLET ORAL EVERY 6 HOURS PRN
Status: CANCELLED | OUTPATIENT
Start: 2019-07-02

## 2019-07-02 RX ORDER — ONDANSETRON 8 MG/1
8 TABLET, ORALLY DISINTEGRATING ORAL
Status: CANCELLED | OUTPATIENT
Start: 2019-07-02

## 2019-07-02 RX ADMIN — HEPARIN 500 UNITS: 100 SYRINGE at 07:09

## 2019-07-02 RX ADMIN — FLUOROURACIL 3570 MG: 50 INJECTION, SOLUTION INTRAVENOUS at 09:25

## 2019-07-02 RX ADMIN — DEXAMETHASONE SODIUM PHOSPHATE 8 MG: 4 INJECTION, SOLUTION INTRA-ARTICULAR; INTRALESIONAL; INTRAMUSCULAR; INTRAVENOUS; SOFT TISSUE at 08:46

## 2019-07-02 ASSESSMENT — ENCOUNTER SYMPTOMS
SHORTNESS OF BREATH: 1
DIZZINESS: 0
CHILLS: 0
HEADACHES: 0
INSOMNIA: 0
FEVER: 0
MYALGIAS: 0
WEIGHT LOSS: 0
PALPITATIONS: 0
WHEEZING: 0
DIARRHEA: 0
VOMITING: 0
TINGLING: 1
COUGH: 1
NAUSEA: 0
CONSTIPATION: 0

## 2019-07-02 ASSESSMENT — PAIN SCALES - GENERAL: PAINLEVEL: NO PAIN

## 2019-07-02 NOTE — PROGRESS NOTES
"Pharmacy Chemotherapy Calculation:    Patient Name: Karlene Walters   Dx: Colon CA        Cycle 86 ([place] cycle 78)    Previous treatment: 6/18/19    Protocol: 5-FU + Leucovorin      IV over 2 hours on Day 1, followed by    IVP on Day 1, followed by                               -- 10/31/17: delete Leucovorin and 5-FU push d/t neutropenia per Dr. Hardy   Fluorouracil  continuous infusion over 46-48 hours                              -- 20% reduction (1920 mg/m²) to be continued starting C28 per C Alsop APRN   14 day cycles for 12 cycles (adjuvant) or until disease progression or unacceptable toxicity  NCCN Guidelines for Colon Cancer. V.2.2015.  Jay T, et al. Eur J Cancer.1999;35(9):1343-7.      Allergies: Codeine; Oxaliplatin; Pcn; Sulfa drugs; and Tape       /80   Pulse 61   Temp 36.1 °C (97 °F) (Temporal) Comment: transfered vitals from earlier.  Resp 18   Ht 1.651 m (5' 5\") Comment: transfered height from earlier.  Wt 75.4 kg (166 lb 3.6 oz) Comment: transfered weight from earlier.  LMP  (LMP Unknown)   SpO2 100%   BMI 27.66 kg/m²  Body surface area is 1.86 meters squared.    Labs from 7/2/19reviewed - all within treatment parameters.       Fluorouracil (5-FU) 1920 mg/m² x 1.86m² = 3571 mg              <10% difference, OK to treat with final dose = 3570mg CIVI over 46 hrs       DARIN Arroyo Pharm.D.      "

## 2019-07-02 NOTE — PROGRESS NOTES
Patient presents for lab draw prior to chemotherapy later today. Port accessed using sterile technique, flushes well with blood drawn as ordered. Port flushed per protocol and left accessed. Patient released in no acute distress.

## 2019-07-02 NOTE — PROGRESS NOTES
"Subjective:      Karlene Walters is a 79 y.o. female who presents for Follow-Up (prechemo) metastatic rectal carcinoma.         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  She is accompanied by her  for today's visit.  Patient is scheduled to receive cycle #78 of single agent 5-FU.    Patient continues to tolerate treatment very well.  She does tire easily she says but it stable and tolerable.  She rests as needed.  She denies fevers or chills or weight loss.  Appetite is also stable however food just does not taste well.  She does have a mild cough of clearing her throat.  She does still have a shortness of breath present but it is not worsening.  She is seen by pulmonary and scheduled to follow-up with them later this month.  She does have intermittent bilateral lower extremity edema at times and takes Lasix as needed.  She states that she takes it a couple times per week.  This is provided to her by cardiology and she was recently seen by cardiology.  She denies chest pain, heart palpitations.  She denies nausea, vomiting, diarrhea or constipation.  Her output urostomy has been very stable.  She voids without difficulty denies any significant pain.  She does have continued numbness to the her fingertips and toes but they are not worsening.  She denies dizziness or headaches.    Patient had a CT scan of her abdomen in March it is scheduled for cyclic follow-up CT scan on September 9 that was ordered by her surgeon, Dr. West.     Allergies   Allergen Reactions   • Oxaliplatin Anaphylaxis   • Codeine      \"gets drunk\"   • Pcn [Penicillins] Itching   • Sulfa Drugs Itching   • Tape Rash     PAPER TAPE OK     Current Outpatient Prescriptions on File Prior to Visit   Medication Sig Dispense Refill   • raNITidine (ZANTAC) 150 MG Tab Take 1 Tab by mouth 2 Times a Day. 60 Tab 3   • rivaroxaban (XARELTO) 10 MG Tab tablet Take 1 Tab by mouth every day. 90 " Tab 1   • Tiotropium Bromide-Olodaterol (STIOLTO RESPIMAT) 2.5-2.5 MCG/ACT Aero Soln Take 2 Puffs by mouth every day. 1 Inhaler 11   • albuterol (PROAIR HFA) 108 (90 Base) MCG/ACT Aero Soln inhalation aerosol Inhale 2 Puffs by mouth every four hours as needed for Shortness of Breath (wheezing). 1 Inhaler 11   • metronidazole (METROCREAM) 0.75 % cream Apply 1 Each to affected area(s) 2 times a day.     • loperamide (IMODIUM) 2 MG Cap Take 2 mg by mouth 4 times a day as needed for Diarrhea.     • cyanocobalamin (VITAMIN B-12) 500 MCG Tab Take 500 mcg by mouth every day.     • Multiple Vitamins-Minerals (CENTRUM SILVER PO) Take 1 Tab by mouth every day.     • Cholecalciferol (VITAMIN D3) 400 UNITS CAPS Take 1 Cap by mouth every day.     • loratadine (CLARITIN) 10 MG TABS Take 10 mg by mouth every day. Indications: Hayfever     • furosemide (LASIX) 40 MG Tab Take 1 Tab by mouth every day. 90 Tab 3   • potassium chloride ER (KLOR-CON) 10 MEQ tablet Take 1 Tab by mouth every day. 90 Tab 3   • lidocaine-prilocaine (EMLA) 2.5-2.5 % Cream APPLY A THICK FILM OVER THE PORT ACCESS SITE PRIOR TO ACCESS 30 g 3   • ondansetron (ZOFRAN) 4 MG Tab tablet Take 1 Tab by mouth as needed. 30 Tab 3     No current facility-administered medications on file prior to visit.        Review of Systems   Constitutional: Negative for chills, fever, malaise/fatigue (tires easily but it is stable and tolerable) and weight loss.   Respiratory: Positive for cough (clearing of her throat) and shortness of breath (still present and not worsening). Negative for wheezing.    Cardiovascular: Positive for leg swelling (intermittently and takes Lasix as needed - see cardiology). Negative for chest pain and palpitations.   Gastrointestinal: Negative for constipation, diarrhea, nausea and vomiting.   Genitourinary: Negative for dysuria.   Musculoskeletal: Negative for myalgias.   Neurological: Positive for tingling (tips of fingers and toes are numb not  worsening). Negative for dizziness and headaches.   Psychiatric/Behavioral: The patient does not have insomnia.           Objective:     /58 (BP Location: Right arm, Patient Position: Sitting, BP Cuff Size: Adult)   Pulse 95   Temp 36.6 °C (97.8 °F) (Temporal)   Resp 16   Wt 75 kg (165 lb 3.8 oz)   LMP  (LMP Unknown)   SpO2 97%   BMI 27.50 kg/m²      Physical Exam   Constitutional: She is oriented to person, place, and time. She appears well-developed and well-nourished. No distress.   HENT:   Head: Normocephalic and atraumatic.   Cardiovascular: Normal rate, regular rhythm and normal heart sounds.  Exam reveals no gallop and no friction rub.    No murmur heard.  Pulmonary/Chest: Effort normal and breath sounds normal. No respiratory distress. She has no wheezes.   Abdominal: Soft. Bowel sounds are normal. She exhibits no distension. There is no tenderness.   Musculoskeletal: Normal range of motion. She exhibits no edema or tenderness.   Neurological: She is alert and oriented to person, place, and time.   Skin: Skin is warm and dry. No rash noted. She is not diaphoretic. No erythema. No pallor.   Psychiatric: She has a normal mood and affect. Her behavior is normal.   Vitals reviewed.         [facility]fusion Services on 07/02/2019   Component Date Value Ref Range Status   • WBC 07/02/2019 7.0  4.8 - 10.8 K/uL Final   • RBC 07/02/2019 4.73  4.20 - 5.40 M/uL Final   • Hemoglobin 07/02/2019 13.2  12.0 - 16.0 g/dL Final   • Hematocrit 07/02/2019 43.6  37.0 - 47.0 % Final   • MCV 07/02/2019 92.2  81.4 - 97.8 fL Final   • MCH 07/02/2019 27.9  27.0 - 33.0 pg Final   • MCHC 07/02/2019 30.3* 33.6 - 35.0 g/dL Final   • RDW 07/02/2019 64.8* 35.9 - 50.0 fL Final   • Platelet Count 07/02/2019 249  164 - 446 K/uL Final   • MPV 07/02/2019 9.9  9.0 - 12.9 fL Final   • Neutrophils-Polys 07/02/2019 59.60  44.00 - 72.00 % Final   • Lymphocytes 07/02/2019 21.10* 22.00 - 41.00 % Final   • Monocytes 07/02/2019 13.40   0.00 - 13.40 % Final   • Eosinophils 07/02/2019 4.00  0.00 - 6.90 % Final   • Basophils 07/02/2019 1.30  0.00 - 1.80 % Final   • Immature Granulocytes 07/02/2019 0.60  0.00 - 0.90 % Final   • Nucleated RBC 07/02/2019 0.00  /100 WBC Final   • Neutrophils (Absolute) 07/02/2019 4.20  2.00 - 7.15 K/uL Final    Includes immature neutrophils, if present.   • Lymphs (Absolute) 07/02/2019 1.48  1.00 - 4.80 K/uL Final   • Monos (Absolute) 07/02/2019 0.94* 0.00 - 0.85 K/uL Final   • Eos (Absolute) 07/02/2019 0.28  0.00 - 0.51 K/uL Final   • Baso (Absolute) 07/02/2019 0.09  0.00 - 0.12 K/uL Final   • Immature Granulocytes (abs) 07/02/2019 0.04  0.00 - 0.11 K/uL Final   • NRBC (Absolute) 07/02/2019 0.00  K/uL Final   • Sodium 07/02/2019 138  135 - 145 mmol/L Final   • Potassium 07/02/2019 3.9  3.6 - 5.5 mmol/L Final   • Chloride 07/02/2019 106  96 - 112 mmol/L Final   • Co2 07/02/2019 22  20 - 33 mmol/L Final   • Anion Gap 07/02/2019 10.0  0.0 - 11.9 Final   • Glucose 07/02/2019 109* 65 - 99 mg/dL Final   • Bun 07/02/2019 21  8 - 22 mg/dL Final   • Creatinine 07/02/2019 1.18  0.50 - 1.40 mg/dL Final   • Calcium 07/02/2019 9.7  8.5 - 10.5 mg/dL Final   • AST(SGOT) 07/02/2019 25  12 - 45 U/L Final   • ALT(SGPT) 07/02/2019 23  2 - 50 U/L Final   • Alkaline Phosphatase 07/02/2019 168* 30 - 99 U/L Final   • Total Bilirubin 07/02/2019 0.7  0.1 - 1.5 mg/dL Final   • Albumin 07/02/2019 4.0  3.2 - 4.9 g/dL Final   • Total Protein 07/02/2019 7.1  6.0 - 8.2 g/dL Final   • Globulin 07/02/2019 3.1  1.9 - 3.5 g/dL Final   • A-G Ratio 07/02/2019 1.3  g/dL Final   • GFR If  07/02/2019 53* >60 mL/min/1.73 m 2 Final   • GFR If Non  07/02/2019 44* >60 mL/min/1.73 m 2 Final         Assessment/Plan:     1. Rectal cancer (HCC)     2. Secondary malignant neoplasm of liver (HCC)       Plan  1.  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  She is scheduled to receive cycle #78 today.   Clinically she is very stable and doing very well.  I did review her CBC and CMP and labs are appropriate to proceed with treatment as planned.  Patient did have a CEA completed last month which shows continued to be stable at 1.3.  CEA this month is pending.    2.  Patient to continue to follow-up with cardiology and pulmonology as recommended.    3.  Patient to follow-up in the clinic in 4 weeks or sooner if needed.  She will continue on single agent 5-FU every 2 weeks.

## 2019-07-02 NOTE — PROGRESS NOTES
"Pharmacy Chemotherapy Verification    Dx: Colon CA        Protocol: 5-FU + Leucovorin      IV over 2 hours on Day 1, followed by    IVP on Day 1, followed by         -- 10/31/17: delete Leucovorin and 5-FU push d/t neutropenia per Dr. Hardy   Fluorouracil   IV continuous infusion daily on Days 1 and 2 ( IV over 46-48 hours)        -- 20% reduction (1920 mg/m²) to be continued starting C28 per C Alsop APRN   14 day cycles for 12 cycles (adjuvant) or until disease progression or unacceptable toxicity  NCCN Guidelines for Colon Cancer. V.2.2015.  Jay GROVER, et al. Eur J Cancer.1999;35(9):1343-7.      Allergies:  Codeine; Oxaliplatin; Pcn; Sulfa drugs; and Tape       /80   Pulse 61   Temp 36.1 °C (97 °F) (Temporal) Comment: transfered vitals from earlier.  Resp 18   Ht 1.651 m (5' 5\") Comment: transfered height from earlier.  Wt 75.4 kg (166 lb 3.6 oz) Comment: transfered weight from earlier.  LMP  (LMP Unknown)   SpO2 100%   BMI 27.66 kg/m²  Body surface area is 1.86 meters squared.     Labs from 7/2/19 reviewed - all within treatment parameters.    Drug Order   (Drug name, dose, route, IV Fluid & volume, frequency, number of doses) Cycle 86 (C78 in [place])   Previous treatment: C85 = 6/18/19   Medication = Fluorouracil (5-FU)   Base Dose = 1920 mg/m²  Calc Dose:Base Dose x 1.86 m² = 3571 mg  Final Dose = 3570 mg   Route = CIVI  Drug Conc = 50 mg/mL  Fluid & Volume = 71.4mL (+ 3 mL overfill)  Admin Duration = CIVI over 46 hrs @ 1.6mL/hr   Via CADD pump for home infusion        <10% difference, OK to treat with final dose     By my signature below, I confirm this process was performed independently with the BSA and all final chemotherapy dosing calculations congruent. I have reviewed the above chemotherapy order and that my calculation of the final dose and BSA (when applicable) corroborate those calculations of the  pharmacist.     Tiffanie Olivier, PharmD, BCOP          "

## 2019-07-02 NOTE — PROGRESS NOTES
Chemotherapy Verification - SECONDARY RN       Height = 65in  Weight = 75.4kg  BSA = 1.86m2       Medication: Fluorouracil  Dose: 1920mg/m2 (80% of original dose of 2400mg/m2)  Calculated Dose: 3571.2mg via continuous pump over 46 hours                             (In mg/m2, AUC, mg/kg)       I confirm that this process was performed independently.

## 2019-07-02 NOTE — PROGRESS NOTES
Chemotherapy Verification - PRIMARY RN      Height = 165.1 cm  Weight = 75.4 kg  BSA = 1.86 m2       Medication: Adrucil  Dose: 1,920 mg/m2 over 46 hours  Calculated Dose: 3,571.2 mg                             (In mg/m2, AUC, mg/kg)           I confirm this process was performed independently with the BSA and all final chemotherapy dosing calculations congruent.  Any discrepancies of 10% or greater have been addressed with the chemotherapy pharmacist. The resolution of the discrepancy has been documented in the EPIC progress notes.

## 2019-07-02 NOTE — PROGRESS NOTES
Patient presents for chemotherapy (Adrucil home infusion pump). Patient's lab results within parameters to proceed with treatment today. Port flushes well with brisk blood return. Patient connected to home infusion pump with no new complaints. Patient returns 7/4/2019 and released in no acute distress.

## 2019-07-03 ENCOUNTER — APPOINTMENT (RX ONLY)
Dept: URBAN - METROPOLITAN AREA CLINIC 4 | Facility: CLINIC | Age: 79
Setting detail: DERMATOLOGY
End: 2019-07-03

## 2019-07-03 DIAGNOSIS — L82.1 OTHER SEBORRHEIC KERATOSIS: ICD-10-CM

## 2019-07-03 DIAGNOSIS — L81.4 OTHER MELANIN HYPERPIGMENTATION: ICD-10-CM

## 2019-07-03 DIAGNOSIS — D485 NEOPLASM OF UNCERTAIN BEHAVIOR OF SKIN: ICD-10-CM

## 2019-07-03 DIAGNOSIS — D18.0 HEMANGIOMA: ICD-10-CM

## 2019-07-03 DIAGNOSIS — D22 MELANOCYTIC NEVI: ICD-10-CM

## 2019-07-03 PROBLEM — D22.5 MELANOCYTIC NEVI OF TRUNK: Status: ACTIVE | Noted: 2019-07-03

## 2019-07-03 PROBLEM — D22.72 MELANOCYTIC NEVI OF LEFT LOWER LIMB, INCLUDING HIP: Status: ACTIVE | Noted: 2019-07-03

## 2019-07-03 PROBLEM — D22.61 MELANOCYTIC NEVI OF RIGHT UPPER LIMB, INCLUDING SHOULDER: Status: ACTIVE | Noted: 2019-07-03

## 2019-07-03 PROBLEM — D18.01 HEMANGIOMA OF SKIN AND SUBCUTANEOUS TISSUE: Status: ACTIVE | Noted: 2019-07-03

## 2019-07-03 PROBLEM — D22.71 MELANOCYTIC NEVI OF RIGHT LOWER LIMB, INCLUDING HIP: Status: ACTIVE | Noted: 2019-07-03

## 2019-07-03 PROBLEM — D22.62 MELANOCYTIC NEVI OF LEFT UPPER LIMB, INCLUDING SHOULDER: Status: ACTIVE | Noted: 2019-07-03

## 2019-07-03 PROBLEM — D48.5 NEOPLASM OF UNCERTAIN BEHAVIOR OF SKIN: Status: ACTIVE | Noted: 2019-07-03

## 2019-07-03 PROCEDURE — 11102 TANGNTL BX SKIN SINGLE LES: CPT

## 2019-07-03 PROCEDURE — ? BIOPSY BY SHAVE METHOD

## 2019-07-03 PROCEDURE — ? COUNSELING

## 2019-07-03 PROCEDURE — ? SUNSCREEN RECOMMENDATIONS

## 2019-07-03 PROCEDURE — 99214 OFFICE O/P EST MOD 30 MIN: CPT | Mod: 25

## 2019-07-03 PROCEDURE — ? LIQUID NITROGEN

## 2019-07-03 ASSESSMENT — LOCATION DETAILED DESCRIPTION DERM
LOCATION DETAILED: RIGHT SUPERIOR MEDIAL MIDBACK
LOCATION DETAILED: LEFT ANTERIOR PROXIMAL THIGH
LOCATION DETAILED: NASAL DORSUM
LOCATION DETAILED: LEFT LATERAL MALAR CHEEK
LOCATION DETAILED: RIGHT RADIAL DORSAL HAND
LOCATION DETAILED: RIGHT DISTAL POSTERIOR UPPER ARM
LOCATION DETAILED: LEFT PROXIMAL DORSAL FOREARM
LOCATION DETAILED: SUPERIOR THORACIC SPINE
LOCATION DETAILED: PERIUMBILICAL SKIN
LOCATION DETAILED: RIGHT INFERIOR FOREHEAD
LOCATION DETAILED: RIGHT RIB CAGE
LOCATION DETAILED: LEFT SUPERIOR MEDIAL UPPER BACK
LOCATION DETAILED: RIGHT CENTRAL MALAR CHEEK
LOCATION DETAILED: RIGHT PROXIMAL DORSAL FOREARM
LOCATION DETAILED: LEFT PROXIMAL POSTERIOR UPPER ARM
LOCATION DETAILED: LEFT UPPER CUTANEOUS LIP
LOCATION DETAILED: LEFT INFERIOR ANTERIOR NECK
LOCATION DETAILED: LEFT ULNAR DORSAL HAND
LOCATION DETAILED: LEFT CENTRAL MALAR CHEEK
LOCATION DETAILED: RIGHT ANTERIOR PROXIMAL THIGH
LOCATION DETAILED: RIGHT INFERIOR LATERAL MALAR CHEEK

## 2019-07-03 ASSESSMENT — LOCATION SIMPLE DESCRIPTION DERM
LOCATION SIMPLE: LEFT UPPER BACK
LOCATION SIMPLE: LEFT ANTERIOR NECK
LOCATION SIMPLE: LEFT HAND
LOCATION SIMPLE: NOSE
LOCATION SIMPLE: RIGHT THIGH
LOCATION SIMPLE: LEFT FOREARM
LOCATION SIMPLE: UPPER BACK
LOCATION SIMPLE: LEFT POSTERIOR UPPER ARM
LOCATION SIMPLE: RIGHT FOREARM
LOCATION SIMPLE: RIGHT CHEEK
LOCATION SIMPLE: RIGHT HAND
LOCATION SIMPLE: RIGHT LOWER BACK
LOCATION SIMPLE: RIGHT FOREHEAD
LOCATION SIMPLE: LEFT THIGH
LOCATION SIMPLE: LEFT LIP
LOCATION SIMPLE: RIGHT POSTERIOR UPPER ARM
LOCATION SIMPLE: ABDOMEN
LOCATION SIMPLE: LEFT CHEEK

## 2019-07-03 ASSESSMENT — LOCATION ZONE DERM
LOCATION ZONE: NOSE
LOCATION ZONE: HAND
LOCATION ZONE: NECK
LOCATION ZONE: LEG
LOCATION ZONE: LIP
LOCATION ZONE: TRUNK
LOCATION ZONE: ARM
LOCATION ZONE: FACE

## 2019-07-03 NOTE — HPI: FULL BODY SKIN EXAMINATION
How Severe Are Your Spot(S)?: moderate
What Type Of Note Output Would You Prefer (Optional)?: Bullet Format
What Is The Reason For Today's Visit?: Full Body Skin Examination
What Is The Reason For Today's Visit? (Being Monitored For X): the development of a new lesion
Additional History: Concerned about a spot on upper left upper lip.

## 2019-07-03 NOTE — PROCEDURE: LIQUID NITROGEN
Consent: The patient's consent was obtained including but not limited to risks of crusting, scabbing, blistering, scarring, darker or lighter pigmentary change, recurrence, incomplete removal and infection.
Medical Necessity Information: It is in your best interest to select a reason for this procedure from the list below. All of these items fulfill various CMS LCD requirements except the new and changing color options.
Post-Care Instructions: I reviewed with the patient in detail post-care instructions. Patient is to wear sunprotection, and avoid picking at any of the treated lesions. Pt may apply Vaseline to crusted or scabbing areas.
Include Z78.9 (Other Specified Conditions Influencing Health Status) As An Associated Diagnosis?: No
Medical Necessity Clause: This procedure was medically necessary because the lesions that were treated were:
Detail Level: Detailed
Duration Of Freeze Thaw-Cycle (Seconds): 0

## 2019-07-03 NOTE — PROCEDURE: BIOPSY BY SHAVE METHOD
Electrodesiccation And Curettage Text: The wound bed was treated with electrodesiccation and curettage after the biopsy was performed.
Anesthesia Volume In Cc: 2
Type Of Destruction Used: Curettage
Biopsy Type: H and E
Dressing: bandage
Bill 38458 For Specimen Handling/Conveyance To Laboratory?: no
Was A Bandage Applied: Yes
Hemostasis: Drysol
Silver Nitrate Text: The wound bed was treated with silver nitrate after the biopsy was performed.
Curettage Text: The wound bed was treated with curettage after the biopsy was performed.
Biopsy Method: Personna blade
Billing Type: Third-Party Bill
Post-Care Instructions: I reviewed with the patient in detail post-care instructions. Patient is to keep the biopsy site dry overnight, and then apply bacitracin twice daily until healed. Patient may apply hydrogen peroxide soaks to remove any crusting.
Lab: [id number]
Detail Level: Detailed
Lab Facility: 
Wound Care: Vaseline
Additional Anesthesia Volume In Cc (Will Not Render If 0): 0
Cryotherapy Text: The wound bed was treated with cryotherapy after the biopsy was performed.
Anesthesia Type: 1% lidocaine with epinephrine
Notification Instructions: Patient will be notified of biopsy results. However, patient instructed to call the office if not contacted within 2 weeks.
Size Of Lesion In Cm: 0.2
X Size Of Lesion In Cm: 0.1
Consent: Written consent was obtained and risks were reviewed including but not limited to scarring, infection, bleeding, scabbing, incomplete removal, nerve damage and allergy to anesthesia.
Depth Of Biopsy: dermis
Electrodesiccation Text: The wound bed was treated with electrodesiccation after the biopsy was performed.

## 2019-07-04 ENCOUNTER — OUTPATIENT INFUSION SERVICES (OUTPATIENT)
Dept: ONCOLOGY | Facility: MEDICAL CENTER | Age: 79
End: 2019-07-04
Attending: INTERNAL MEDICINE
Payer: MEDICARE

## 2019-07-04 VITALS
RESPIRATION RATE: 18 BRPM | HEART RATE: 107 BPM | BODY MASS INDEX: 27.95 KG/M2 | WEIGHT: 167.77 LBS | DIASTOLIC BLOOD PRESSURE: 77 MMHG | SYSTOLIC BLOOD PRESSURE: 116 MMHG | OXYGEN SATURATION: 99 % | TEMPERATURE: 97.6 F | HEIGHT: 65 IN

## 2019-07-04 DIAGNOSIS — C20 RECTAL CANCER (HCC): ICD-10-CM

## 2019-07-04 DIAGNOSIS — R94.4 DECREASED GFR: ICD-10-CM

## 2019-07-04 PROCEDURE — 96523 IRRIG DRUG DELIVERY DEVICE: CPT

## 2019-07-04 PROCEDURE — 700111 HCHG RX REV CODE 636 W/ 250 OVERRIDE (IP): Performed by: NURSE PRACTITIONER

## 2019-07-04 RX ADMIN — HEPARIN 500 UNITS: 100 SYRINGE at 11:10

## 2019-07-04 NOTE — PROGRESS NOTES
Here for 5 FU pump disconnect after 46 hrs of continuous infusion. See chemotherapy flow sheet for volume / dose administration. Port flushed per protocol, site d-accessed, needle intact compression dressing applied. Patient discharge home to self care in [facility]. Next appointment confirmed.

## 2019-07-15 RX ORDER — DEXTROSE MONOHYDRATE 50 MG/ML
INJECTION, SOLUTION INTRAVENOUS CONTINUOUS
Status: CANCELLED | OUTPATIENT
Start: 2019-07-16

## 2019-07-15 RX ORDER — ONDANSETRON 2 MG/ML
4 INJECTION INTRAMUSCULAR; INTRAVENOUS
Status: CANCELLED | OUTPATIENT
Start: 2019-07-16

## 2019-07-15 RX ORDER — ONDANSETRON 8 MG/1
8 TABLET, ORALLY DISINTEGRATING ORAL
Status: CANCELLED | OUTPATIENT
Start: 2019-07-16

## 2019-07-15 RX ORDER — DEXAMETHASONE SODIUM PHOSPHATE 4 MG/ML
8 INJECTION, SOLUTION INTRA-ARTICULAR; INTRALESIONAL; INTRAMUSCULAR; INTRAVENOUS; SOFT TISSUE ONCE
Status: CANCELLED | OUTPATIENT
Start: 2019-07-16 | End: 2019-07-16

## 2019-07-15 RX ORDER — PROCHLORPERAZINE MALEATE 10 MG
10 TABLET ORAL EVERY 6 HOURS PRN
Status: CANCELLED | OUTPATIENT
Start: 2019-07-16

## 2019-07-15 RX ORDER — LIDOCAINE HYDROCHLORIDE 10 MG/ML
10 INJECTION, SOLUTION INFILTRATION; PERINEURAL ONCE
Status: CANCELLED | OUTPATIENT
Start: 2019-07-16 | End: 2019-07-16

## 2019-07-15 NOTE — PROGRESS NOTES
"Pharmacy Chemotherapy Verification    Dx: Colon CA        Protocol: 5-FU + Leucovorin      IV over 2 hours on Day 1, followed by    IVP on Day 1, followed by         -- 10/31/17: delete Leucovorin and 5-FU push d/t neutropenia per Dr. Hardy   Fluorouracil   IV continuous infusion daily on Days 1 and 2 ( IV over 46-48 hours)        -- 20% reduction (1920 mg/m²) to be continued starting C28 per C Alsop APRN   14 day cycles for 12 cycles (adjuvant) or until disease progression or unacceptable toxicity  NCCN Guidelines for Colon Cancer. V.2.2015.  Jay GROVER, et al. Eur J Cancer.1999;35(9):1343-7.      Allergies:  Codeine; Oxaliplatin; Pcn; Sulfa drugs; and Tape       /65   Pulse (!) 102   Temp 36.7 °C (98 °F) (Temporal)   Resp 18   Ht 1.65 m (5' 4.96\")   Wt 75.7 kg (166 lb 14.2 oz)   LMP  (LMP Unknown)   SpO2 98%   BMI 27.80 kg/m²  Body surface area is 1.86 meters squared.     Labs from 7/16/19 reviewed - all within treatment parameters.    Drug Order   (Drug name, dose, route, IV Fluid & volume, frequency, number of doses) Cycle 87 (C79 in [place])   Previous treatment: C85 = 7/2/19   Medication = Fluorouracil (5-FU)   Base Dose = 1920 mg/m²  Calc Dose:Base Dose x 1.86 m² = 3571 mg  Final Dose = 3570 mg   Route = CIVI  Drug Conc = 50 mg/mL  Fluid & Volume = 71.4 mL (+ 3 mL overfill)  Admin Duration = CIVI over 46 hrs @ 1.6 mL/hr   Via CADD pump for home infusion        <10% difference, OK to treat with final dose     By my signature below, I confirm this process was performed independently with the BSA and all final chemotherapy dosing calculations congruent. I have reviewed the above chemotherapy order and that my calculation of the final dose and BSA (when applicable) corroborate those calculations of the  pharmacist.     Norris Mitchell, PharmD    "

## 2019-07-15 NOTE — PROGRESS NOTES
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 Also followed by Dr. West surgery.    COPD per Dr. Warren's prior assessment, but no hx of smoking - most likely chronic obstructive asthma. Hx of PE and on Xarelto chronically. History of rectal cancer with metastasis to the liver -patient is currently under the care of Dr. Hardy. Recent PET scan 10/25/2018 indicated no uptake in the lung area.  No nodules.  Hypermetabolic 2.5 cm lesion adjacent/to the right of the low-attenuation treatment bed in the hepatic dome, in keeping with persistent residual disease next to the treatment bed.  She continues to receive chemotherapy. She has had complaints of dyspnea on exertion with no clear etiology. CT scan of chest 6/2018 showed no evidence of PE or masses or nodules. No metastasis. Hx of PE remains on Xarelto 10mg daily. PFT indicated normal spirometry 7/11/18 FEV1 2.43L or 115%, DLCO 98%. Echo indicated normal LV function. She has never smoked. HRCT of chest was performed previously  to rule out ILD especially in light of her history of cancer and chemotherapy. This indicated emphysematous lungs and some areas of basilar atelectasis but no evidence of ILD. She was started on Stiolto 2 puffs QD and MATEO. She feels her breathing has improved overall. We reviewed appropriate MATEO use such as before going to the grocery store/mall. She rarely uses it. She has a mild cough with rare clear phlegm in the AM and no wheezing. She notes chronic postnasal drip. She overall feels well but fatigued from the chemotherapy.      She is anticipating trip to [facility] in March.      Assessment:  1. Pulmonary emphysema, unspecified emphysema type (HCC)      2. Rectal cancer (HCC)      3. Secondary malignant neoplasm of liver (HCC)      4. History of pulmonary embolism      5. Essential hypertension      6. BMI 29.0-29.9,adult      7. Nonsmoker             Immunizations:     Flu:11/2018  Pneumovax 23:2006  Prevnar 13:2018     Plan:  1.  Continue inhaler regimen.  Reviewed appropriate albuterol HFA inhaler use prior to activities or some shortness of breath.  2.  Discussed respiratory hygiene.  3.  Encouraged slow weight loss of no more than 10 pounds.  4.  Follow-up in 6 months with PFT to check symptoms, sooner if needed.

## 2019-07-16 ENCOUNTER — OUTPATIENT INFUSION SERVICES (OUTPATIENT)
Dept: ONCOLOGY | Facility: MEDICAL CENTER | Age: 79
End: 2019-07-16
Attending: INTERNAL MEDICINE
Payer: MEDICARE

## 2019-07-16 VITALS
BODY MASS INDEX: 27.81 KG/M2 | SYSTOLIC BLOOD PRESSURE: 146 MMHG | WEIGHT: 166.89 LBS | HEIGHT: 65 IN | TEMPERATURE: 98 F | DIASTOLIC BLOOD PRESSURE: 65 MMHG | RESPIRATION RATE: 18 BRPM | HEART RATE: 81 BPM | OXYGEN SATURATION: 98 %

## 2019-07-16 DIAGNOSIS — R94.4 DECREASED GFR: ICD-10-CM

## 2019-07-16 DIAGNOSIS — C20 RECTAL CANCER (HCC): ICD-10-CM

## 2019-07-16 LAB
BASOPHILS # BLD AUTO: 1 % (ref 0–1.8)
BASOPHILS # BLD: 0.06 K/UL (ref 0–0.12)
EOSINOPHIL # BLD AUTO: 0.18 K/UL (ref 0–0.51)
EOSINOPHIL NFR BLD: 2.9 % (ref 0–6.9)
ERYTHROCYTE [DISTWIDTH] IN BLOOD BY AUTOMATED COUNT: 63.6 FL (ref 35.9–50)
HCT VFR BLD AUTO: 40.7 % (ref 37–47)
HGB BLD-MCNC: 12.9 G/DL (ref 12–16)
IMM GRANULOCYTES # BLD AUTO: 0.03 K/UL (ref 0–0.11)
IMM GRANULOCYTES NFR BLD AUTO: 0.5 % (ref 0–0.9)
LYMPHOCYTES # BLD AUTO: 1.25 K/UL (ref 1–4.8)
LYMPHOCYTES NFR BLD: 20.2 % (ref 22–41)
MCH RBC QN AUTO: 28.9 PG (ref 27–33)
MCHC RBC AUTO-ENTMCNC: 31.7 G/DL (ref 33.6–35)
MCV RBC AUTO: 91.1 FL (ref 81.4–97.8)
MONOCYTES # BLD AUTO: 0.84 K/UL (ref 0–0.85)
MONOCYTES NFR BLD AUTO: 13.6 % (ref 0–13.4)
NEUTROPHILS # BLD AUTO: 3.83 K/UL (ref 2–7.15)
NEUTROPHILS NFR BLD: 61.8 % (ref 44–72)
NRBC # BLD AUTO: 0 K/UL
NRBC BLD-RTO: 0 /100 WBC
PLATELET # BLD AUTO: 247 K/UL (ref 164–446)
PMV BLD AUTO: 10 FL (ref 9–12.9)
RBC # BLD AUTO: 4.47 M/UL (ref 4.2–5.4)
WBC # BLD AUTO: 6.2 K/UL (ref 4.8–10.8)

## 2019-07-16 PROCEDURE — G0498 CHEMO EXTEND IV INFUS W/PUMP: HCPCS

## 2019-07-16 PROCEDURE — 700111 HCHG RX REV CODE 636 W/ 250 OVERRIDE (IP): Performed by: NURSE PRACTITIONER

## 2019-07-16 PROCEDURE — 700111 HCHG RX REV CODE 636 W/ 250 OVERRIDE (IP)

## 2019-07-16 PROCEDURE — 96374 THER/PROPH/DIAG INJ IV PUSH: CPT

## 2019-07-16 PROCEDURE — 85025 COMPLETE CBC W/AUTO DIFF WBC: CPT

## 2019-07-16 PROCEDURE — A4212 NON CORING NEEDLE OR STYLET: HCPCS

## 2019-07-16 RX ORDER — DEXAMETHASONE SODIUM PHOSPHATE 4 MG/ML
8 INJECTION, SOLUTION INTRA-ARTICULAR; INTRALESIONAL; INTRAMUSCULAR; INTRAVENOUS; SOFT TISSUE ONCE
Status: COMPLETED | OUTPATIENT
Start: 2019-07-16 | End: 2019-07-16

## 2019-07-16 RX ADMIN — FLUOROURACIL 3570 MG: 50 INJECTION, SOLUTION INTRAVENOUS at 09:35

## 2019-07-16 RX ADMIN — DEXAMETHASONE SODIUM PHOSPHATE 8 MG: 4 INJECTION, SOLUTION INTRAMUSCULAR; INTRAVENOUS at 09:11

## 2019-07-16 NOTE — PROGRESS NOTES
Chemotherapy Verification - SECONDARY RN       Height = 64.96in  Weight = 75.7kg  BSA = 1.86m2       Medication: Fluorouracil  Dose: 1920mg/m2 (80% of original dose of 2400mg/m2)  Calculated Dose: 3571.2mg                             (In mg/m2, AUC, mg/kg)         I confirm that this process was performed independently.

## 2019-07-16 NOTE — PROGRESS NOTES
Pt arrived ambulatory to [facility] for labs/5FU pump connect.  L chest port in place, accessed in sterile field.  Brisk blood return noted, labs drawn as ordered and results reviewed.  Pt meets parameters for treatment today.  Premedication given and pump connected for 46 hour home infusion.  Next appointment confirmed.  Pt discharged from IS in NAD with spouse.

## 2019-07-16 NOTE — PROGRESS NOTES
"Pharmacy Chemotherapy Verification  Patient Name: Karlene Walters   Dx: Colon CA        Cycle 87 ([place] cycle 79)    Previous treatment: 7/2/19    Protocol: 5-FU + Leucovorin      IV over 2 hours on Day 1, followed by    IVP on Day 1, followed by                               -- 10/31/17: delete Leucovorin and 5-FU push d/t neutropenia per Dr. Hardy   Fluorouracil  continuous infusion over 46-48 hours                              -- 20% reduction (1920 mg/m²) to be continued starting C28 per C Alsop APRN   14 day cycles for 12 cycles (adjuvant) or until disease progression or unacceptable toxicity  NCCN Guidelines for Colon Cancer. V.2.2015.  Jay T, et al. Eur J Cancer.1999;35(9):1343-7.      Allergies: Codeine; Oxaliplatin; Pcn; Sulfa drugs; and Tape     /65   Pulse 81   Temp 36.7 °C (98 °F) (Temporal)   Resp 18   Ht 1.65 m (5' 4.96\")   Wt 75.7 kg (166 lb 14.2 oz)   LMP  (LMP Unknown)   SpO2 98%   BMI 27.80 kg/m²  Body surface area is 1.86 meters squared.    Labs 7/16/19  ANC 3830 Hgb 12.9 Plt 247k    Labs 7/2/19  SCr 1.18 CrCl 46 mL/min  AST/ALT/AP = 25/23/168 Tbili = 0.7    Fluorouracil (5-FU) 1920 mg/m² x 1.86 m² = 3571.2 mg              <10% difference, OK to treat with final dose = 3570 mg CIVI over 46 hrs    Lora Hernandez, PharmD, BCOP        "

## 2019-07-16 NOTE — PROGRESS NOTES
Patient seen today for 5FU home pump. Plan of care discussed. Port accessed with low profile needle using sterile technique. Blood obtained for CBC. Assessment completed as charted. CBC results reviewed and 5FU ordered. AZEB Thomas to assume patient care, report given.

## 2019-07-16 NOTE — PROGRESS NOTES
Chemotherapy Verification - PRIMARY RN      Height = 165 cm  Weight = 75.7 kg  BSA = 1.86 m2       Medication: 5FU home infusion  Dose: 1920 mg/m2  Calculated Dose: 3571.2 mg                             (In mg/m2, AUC, mg/kg)     I confirm this process was performed independently with the BSA and all final chemotherapy dosing calculations congruent.  Any discrepancies of 10% or greater have been addressed with the chemotherapy pharmacist. The resolution of the discrepancy has been documented in the EPIC progress notes.

## 2019-07-17 ENCOUNTER — OFFICE VISIT (OUTPATIENT)
Dept: PULMONOLOGY | Facility: HOSPICE | Age: 79
End: 2019-07-17
Payer: MEDICARE

## 2019-07-17 ENCOUNTER — NON-PROVIDER VISIT (OUTPATIENT)
Dept: PULMONOLOGY | Facility: HOSPICE | Age: 79
End: 2019-07-17
Attending: NURSE PRACTITIONER
Payer: MEDICARE

## 2019-07-17 VITALS — WEIGHT: 168 LBS | BODY MASS INDEX: 27.99 KG/M2

## 2019-07-17 VITALS
SYSTOLIC BLOOD PRESSURE: 140 MMHG | DIASTOLIC BLOOD PRESSURE: 64 MMHG | BODY MASS INDEX: 27.99 KG/M2 | HEIGHT: 65 IN | RESPIRATION RATE: 16 BRPM | WEIGHT: 168 LBS | HEART RATE: 90 BPM | OXYGEN SATURATION: 97 %

## 2019-07-17 DIAGNOSIS — C78.7 SECONDARY MALIGNANT NEOPLASM OF LIVER (HCC): Chronic | ICD-10-CM

## 2019-07-17 DIAGNOSIS — J44.9 CHRONIC OBSTRUCTIVE PULMONARY DISEASE, UNSPECIFIED COPD TYPE (HCC): ICD-10-CM

## 2019-07-17 DIAGNOSIS — J43.9 PULMONARY EMPHYSEMA, UNSPECIFIED EMPHYSEMA TYPE (HCC): Chronic | ICD-10-CM

## 2019-07-17 DIAGNOSIS — Z79.01 CURRENT USE OF LONG TERM ANTICOAGULATION: ICD-10-CM

## 2019-07-17 DIAGNOSIS — R06.02 SHORTNESS OF BREATH: ICD-10-CM

## 2019-07-17 DIAGNOSIS — Z86.711 HISTORY OF PULMONARY EMBOLISM: Chronic | ICD-10-CM

## 2019-07-17 DIAGNOSIS — I10 ESSENTIAL HYPERTENSION: ICD-10-CM

## 2019-07-17 DIAGNOSIS — Z78.9 NONSMOKER: ICD-10-CM

## 2019-07-17 DIAGNOSIS — C20 RECTAL CANCER (HCC): Chronic | ICD-10-CM

## 2019-07-17 DIAGNOSIS — R63.4 WEIGHT LOSS: ICD-10-CM

## 2019-07-17 PROCEDURE — 99214 OFFICE O/P EST MOD 30 MIN: CPT | Mod: 25 | Performed by: NURSE PRACTITIONER

## 2019-07-17 PROCEDURE — 94726 PLETHYSMOGRAPHY LUNG VOLUMES: CPT | Performed by: INTERNAL MEDICINE

## 2019-07-17 PROCEDURE — 94060 EVALUATION OF WHEEZING: CPT | Performed by: INTERNAL MEDICINE

## 2019-07-17 PROCEDURE — 94729 DIFFUSING CAPACITY: CPT | Performed by: INTERNAL MEDICINE

## 2019-07-17 ASSESSMENT — PULMONARY FUNCTION TESTS
FEV1: 2.37
FEV1/FVC: 82
FEV1/FVC_PERCENT_PREDICTED: 113
FVC_PERCENT_PREDICTED: 104
FEV1_PERCENT_CHANGE: -2
FEV1/FVC: 82
FEV1_PERCENT_PREDICTED: 114
FEV1_PERCENT_CHANGE: 0
FVC_PERCENT_PREDICTED: 102
FEV1/FVC_PERCENT_PREDICTED: 112
FVC: 2.88
FEV1/FVC_PERCENT_PREDICTED: 111
FVC: 2.82
FEV1_PERCENT_PREDICTED: 114
FEV1/FVC_PERCENT_PREDICTED: 110
FEV1: 2.36
FEV1_PREDICTED: 2.06
FEV1/FVC: 83.69
FEV1_LLN: 1.72
FEV1/FVC: 84
FEV1/FVC_PERCENT_PREDICTED: 75
FEV1/FVC_PREDICTED: 74
FVC_PREDICTED: 2.76
FEV1/FVC_PERCENT_CHANGE: 1
FVC_LLN: 2.30
FEV1/FVC_PERCENT_CHANGE: 0
FEV1/FVC_PERCENT_LLN: 62

## 2019-07-17 NOTE — PROCEDURES
Multi-Ox Readings  Multi Ox #1 Room air   O2 sat % at rest 98   O2 sat % on exertion 93   O2 sat average on exertion 96   Multi Ox #2     O2 sat % at rest     O2 sat % on exertion     O2 sat average on exertion       Oxygen Use     Oxygen Frequency     Duration of need     Is the patient mobile within the home?     CPAP Use?     BIPAP Use?     Servo Titration

## 2019-07-17 NOTE — PROCEDURES
Technician: MERLYN Rae    Technician Comment:  Good patient effort & cooperation.  The results of this test meet the ATS/ERS standards for acceptability & reproducibility.  Test was performed on the [employer] Body Plethysmograph-Elite DX system.  Predicted values were [facility] for spirometry, [facility] for DLCO, ITS for Lung Volumes.  The DLCO was uncorrected for Hgb.  A bronchodilator of Ventolin HFA -2puffs via spacer administered.  DLCO performed during dilation period.    1. Baseline spirometry demonstrates a normal  FEV1 and FVC. FEV1/FVC ratio is normal.  FEV1 is 2.37 L which is 114% of predicted.    2. After administration of an inhaled bronchodilator there is no improvement in FEV1.    3. Lung volumes demonstrate a normal total lung capacity at 95% of predicted.    4. Gas exchange as estimated by DLCO is normal at 97% of predicted.    5. Airway resistance is normal.      Impression:    No evidence of significant obstructive or restrictive pulmonary disease is noted on the study.

## 2019-07-17 NOTE — LETTER
BROWN Garrison  [facility]   [address] [place] [address] [address] [address]  Phone: [phone] - Fax: [phone]           Encounter Date: 7/17/2019  Provider: BROWN Garrison  Location of Care: [facility] PULMONARY MEDICINE      Patient:   Karlene Walters   MR Number: [id number]   YOB: 1940     PROGRESS NOTE:  Chief Complaint   Patient presents with   • COPD     CT Scan 12/25/19       HPI:  Karlene Walters is a 79 y.o. year old female here today for follow-up on COPD with PFT results.     Last OV 1/9/19 - she has lost 8lbs, BMI 27.    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 Also followed by Dr. West surgery.    Hx of COPD per Dr. Warren's prior assessment, but no hx of smoking - most likely chronic obstructive asthma. Hx of PE and on Xarelto chronically. History of rectal cancer with metastasis to the liver -patient is currently under the care of Dr. Hardy. PET scan 10/25/2018 indicated no uptake in the lung area.  No nodules.  Hypermetabolic 2.5 cm lesion adjacent/to the right of the low-attenuation treatment bed in the hepatic dome, in keeping with persistent residual disease next to the treatment bed.  She continues to receive chemotherapy. She has had complaints of dyspnea on exertion with no clear etiology.   CT scan of chest 6/2018 showed no evidence of PE or masses or nodules. No metastasis. Hx of PE remains on Xarelto 10mg daily.   CT abdomen liver 3/4/2019 indicated right hepatic dome treated metastasis stable with no local recurrence.  New from 2017 subcapsular left hepatic 8 mm hypodense lesion does not appear to enhance.  Most likely a cyst.    PFT indicated normal spirometry 7/11/18 FEV1 2.43L or 115%, DLCO 98%.   PFT 7/17/2019 indicates normal findings with FEV1 2.37 L 114%, FEV1/FVC ratio 82, TLC 95% predicted, DLCO 97% predicted.  No significant  bronchodilator response noted.  Patient remains on Stiolto and albuterol HFA inhaler.  She did take her Stiolto this morning.  Reviewed findings with patient.  Echo indicated normal LV function.   HRCT of chest was performed previously  to rule out ILD especially in light of her history of cancer and chemotherapy. This indicated emphysematous lungs and some areas of basilar atelectasis but no evidence of ILD.   She was started on Stiolto 2 puffs QD and MATEO. She feels her breathing has improved overall.     She continues to have chronic dyspnea with exertion. She denies nocturnal symptoms. She notes testing her levels at home with drops to 77% using her husbands pulse ox. She notes once she rests, O2 levels recover.  Room air sat was 97% today.  She can walk for about 30min and then needs to sit due to dyspnea.  She denies significant cough, phlegm, chest tightness or wheezing.    ROS: As per HPI and otherwise negative if not stated.    Past Medical History:   Diagnosis Date   • Arrhythmia     occasional   • Blood clotting disorder ([facility]) 08/2015    bilat PE    • Blood transfusion 1957   • Breath shortness     no O2 with moderate exertion   • Cancer ([facility]) 09/2012    rectal cancer,  Liver CA-2015   • CATARACT     removed b/l   • Chemotherapy-induced neutropenia (HCC) 9/28/2016   • Chickenpox    • Colon cancer ([facility])    • Dental disorder     dentures UPPERS AND LOWERS   • Elevated alkaline phosphatase level 11/15/2017   • Emphysema of lung ([facility])    • Fall    • [place] measles    • Heart murmur     as a child   • Hemorrhagic disorder (HCC)     on Xarelto    • Hypercholesteremia    • Hypertension     no meds currently    • Ileostomy in place ([facility])    • Indigestion    • Influenza    • Lightheadedness 9/17/2015   • Mumps    • Obstruction     ileostomy   • Other specified symptom associated with female genital organs     HYSTERECTOMY 1993   • Pneumonia 05/2017    tx'd with antibx   • Pulmonary embolism (HCC)    • Rectal  adenocarcinoma metastatic to liver (HCC)    • Renal insufficiency 3/14/2016   • Routine general medical examination at a health care facility 3/14/2016    3/14/16   • Seasonal allergies    • Swelling of both ankles     Lasix PRN   • Tonsillitis        Past Surgical History:   Procedure Laterality Date   • HEPATIC ABLATION LAPAROSCOPIC  11/15/2018    Procedure: HEPATIC ABLATION LAPAROSCOPIC.- SEGMENT 7/8;  Surgeon: Kit West M.D.;  Location: SURGERY [facility];  Service: General   • COLONOSCOPY WITH BIOPSY  8/23/2015    Procedure: COLONOSCOPY WITH BIOPSY;  Surgeon: Wilbur Glasgow M.D.;  Location: ENDOSCOPY [facility];  Service:    • HEPATIC ABLATION LAPAROSCOPIC  7/6/2015    Procedure: HEPATIC ABLATION LAPAROSCOPIC.;  Surgeon: Kit West M.D.;  Location: SURGERY [facility];  Service:    • CATH PLACEMENT Left 7/6/2015    Procedure: CATH PLACEMENT CEPHALIC POWERPORT;  Surgeon: Kit West M.D.;  Location: SURGERY [facility];  Service:    • FISTULA IN ANO REPAIR  1/28/2015    Performed by Kolton Montgomery M.D. at SURGERY [facility]   • PERINEAL PROCEDURE  1/28/2015    Performed by Kolton Montgomery M.D. at SURGERY [facility]   • FISTULA IN ANO REPAIR  10/15/2014    Performed by Kolton Montgomery M.D. at SURGERY [facility]   • PERINEAL PROCEDURE  10/15/2014    Performed by Kolton Montgomery M.D. at SURGERY [facility]   • IRRIGATION & DEBRIDEMENT GENERAL  2/19/2014    Performed by Kolton Montgomery M.D. at SURGERY [facility]   • FLAP GRAFT  2/19/2014    Performed by Kolton Montgomery M.D. at SURGERY [facility]   • PERINEAL PROCEDURE  12/18/2013    Performed by Kolton Montgomery M.D. at SURGERY [facility]   • MYOCUTANEOUS FLAP  12/18/2013    Performed by Kolton Montgomery M.D. at [facility]   • IRRIGATION & DEBRIDEMENT GENERAL  10/23/2013    Performed by Kolton Montgomery M.D. at SURGERY [facility]   • FISTULA IN ANO REPAIR  9/4/2013     Performed by Kolton Montgomery M.D. at SURGERY [facility]   • FLAP ROTATION  9/4/2013    Performed by Kolton Montgomery M.D. at SURGERY [facility]   • RECOVERY  8/26/2013    Performed by [facility]Recovery Surgery at [facility] SAME DAY [facility]   • BIOPSY GENERAL  7/17/2013    Performed by Kolton Montgomery M.D. at SURGERY [facility]   • PROCTOSCOPY  7/17/2013    Performed by Kolton Montgomery M.D. at SURGERY [facility]   • FISTULA IN ANO REPAIR  2/27/2013    Performed by Kolton Montgomery M.D. at SURGERY [facility]   • SIGMOIDOSCOPY  2/27/2013    Performed by Kolton Montgomery M.D. at SURGERY [facility]   • LAPAROSCOPY  10/8/2012    Performed by Kolton Montgomery M.D. at [facility]   • ILEO LOOP DIVERSION  10/8/2012    Performed by Kolton Montgomery M.D. at SURGERY [facility]   • COLECTOMY  9/26/2012    Performed by Kolton Montgomery M.D. at [facility]   • COLONOSCOPY FLEX W/ENDO US  9/20/2012    Performed by Harshil Bolton M.D. at SURGERY [facility]   • OTHER  2009    left eye torn retina repair   • CATARACT EXTRACTION WITH IOL  2004    bilateral   • ABDOMINAL HYSTERECTOMY TOTAL  1983   • CYST EXCISION  1972    ovarian   • COLON RESECTION     • EYE SURGERY      cataracts   • HYSTERECTOMY LAPAROSCOPY     • PB REMV 2ND CATARACT,CORN-SCLER SECTN     • TONSILLECTOMY         Family History   Problem Relation Age of Onset   • Heart Disease Mother    • Heart Failure Mother    • Hypertension Mother    • Hyperlipidemia Mother    • Cancer Mother    • Cancer Maternal Aunt 60        breast ca   • Lung Disease Brother         COPD/Emphysema/Smoker   • Cancer Brother         prostate cancer   • Alcohol/Drug Brother         Alcohol   • Kidney Disease Father         renal failure       Social History     Social History   • Marital status:      Spouse name: N/A   • Number of children: N/A   • Years of education: N/A     Occupational History   • Not on file.     Social History Main Topics   •  "Smoking status: Never Smoker   • Smokeless tobacco: Never Used   • Alcohol use No      Comment: rare   • Drug use: No   • Sexual activity: No     Other Topics Concern   • Not on file     Social History Narrative   • No narrative on file       Allergies as of 07/17/2019 - Reviewed 07/17/2019   Allergen Reaction Noted   • Oxaliplatin Anaphylaxis 10/27/2015   • Codeine  04/05/2011   • Pcn [penicillins] Itching 04/05/2011   • Sulfa drugs Itching 04/05/2011   • Tape Rash 09/04/2013        @Vital signs for this encounter:  Vitals:    07/17/19 0953 07/17/19 0957   Height: 1.65 m (5' 4.96\")    Weight: 76.2 kg (168 lb)    Weight % change since last entry.: 0 %    BP: 140/64    Pulse: 90    BMI (Calculated): 27.99    Resp: 16    O2 sat % room air:  97 %       Current medications as of today   Current Outpatient Prescriptions   Medication Sig Dispense Refill   • furosemide (LASIX) 40 MG Tab Take 1 Tab by mouth every day. 90 Tab 3   • potassium chloride ER (KLOR-CON) 10 MEQ tablet Take 1 Tab by mouth every day. 90 Tab 3   • raNITidine (ZANTAC) 150 MG Tab Take 1 Tab by mouth 2 Times a Day. 60 Tab 3   • rivaroxaban (XARELTO) 10 MG Tab tablet Take 1 Tab by mouth every day. 90 Tab 1   • Tiotropium Bromide-Olodaterol (STIOLTO RESPIMAT) 2.5-2.5 MCG/ACT Aero Soln Take 2 Puffs by mouth every day. 1 Inhaler 11   • metronidazole (METROCREAM) 0.75 % cream Apply 1 Each to affected area(s) 2 times a day.     • loperamide (IMODIUM) 2 MG Cap Take 2 mg by mouth 4 times a day as needed for Diarrhea.     • cyanocobalamin (VITAMIN B-12) 500 MCG Tab Take 500 mcg by mouth every day.     • Multiple Vitamins-Minerals (CENTRUM SILVER PO) Take 1 Tab by mouth every day.     • Cholecalciferol (VITAMIN D3) 400 UNITS CAPS Take 1 Cap by mouth every day.     • loratadine (CLARITIN) 10 MG TABS Take 10 mg by mouth every day. Indications: Hayfever     • lidocaine-prilocaine (EMLA) 2.5-2.5 % Cream APPLY A THICK FILM OVER THE PORT ACCESS SITE PRIOR TO ACCESS 30 g " 3   • ondansetron (ZOFRAN) 4 MG Tab tablet Take 1 Tab by mouth as needed. 30 Tab 3   • albuterol (PROAIR HFA) 108 (90 Base) MCG/ACT Aero Soln inhalation aerosol Inhale 2 Puffs by mouth every four hours as needed for Shortness of Breath (wheezing). 1 Inhaler 11     No current facility-administered medications for this visit.          Physical Exam:   Gen:           Alert and oriented, No apparent distress. Mood and affect appropriate, normal interaction with examiner.  Eyes:          PERRL, EOM intact, sclere white, conjunctive moist.  Ears:          Not examined.   Hearing:     Grossly intact.  Nose:          Normal, no lesions or deformities.  Dentition:    Good dentition.  Oropharynx:   Tongue normal, posterior pharynx without erythema or exudate.  Mallampati Classification: 2/3  Neck:        Supple, trachea midline, no masses.  Respiratory Effort: No intercostal retractions or use of accessory muscles.   Lung Auscultation:      Clear to auscultation bilaterally; no rales, rhonchi or wheezing.  CV:            Regular rate and rhythm. No murmurs, rubs or gallops.  Abd:           Not examined.   Lymphadenopathy: Not examined.  Gait and Station: Normal.  Digits and Nails: No clubbing, cyanosis, petechiae, or nodes.   Cranial Nerves: II-XII grossly intact.  Skin:        No rashes, lesions or ulcers noted.               Ext:           No cyanosis or edema.      Assessment:  1. Pulmonary emphysema, unspecified emphysema type (HCC)     2. History of pulmonary embolism     3. Current use of long term anticoagulation     4. Rectal cancer (HCC)     5. Secondary malignant neoplasm of liver (HCC)     6. Essential hypertension     7. Nonsmoker     8. BMI 27.0-27.9,adult     9. Weight loss         Immunizations:    Flu:11/2018  Pneumovax 23:2006  Prevnar 13:2018    Plan:  1. Patient has chronic dyspnea, but no clear etiology. PFTs are normal. HRCT did not reveal ILD changes that can occur with long term chemo. I do not feel  updating HRCT is necessary due to normal/stable lung function. It has not worsened, but only occurs on exertion. Symptoms improved with bronchodilator therapy but not resolved.  Multi ox in office indicated normal RA saturations on exertion.  DME CNOX on RA now. Advised patient to call/mychart for results.  2.  Discussed respiratory hygiene.  3.  Follow-up with oncology as scheduled.  4.  Follow-up with cardiology as scheduled.  5.  Follow up in 6 mos to check symptoms, sooner if needed. If symptoms worsen, we will update HRCT.    Please note that this dictation was created using voice recognition software. I have made every reasonable attempt to correct obvious errors, but it is possible there are errors of grammar and possibly content that I did not discover before finalizing the note.        Electronically signed by Asha Malone A.P.R.NAngela  on 07/17/19    Ahsan Haque M.D.  [address]  [address]  [facility] [id number]  VIA In Basket

## 2019-07-17 NOTE — PROGRESS NOTES
Chief Complaint   Patient presents with   • COPD     CT Scan 12/25/19       HPI:  Karlene Walters is a 79 y.o. year old female here today for follow-up on COPD with PFT results.     Last OV 1/9/19 - she has lost 8lbs, BMI 27.    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 Also followed by Dr. West surgery.    Hx of COPD per Dr. Warren's prior assessment, but no hx of smoking - most likely chronic obstructive asthma. Hx of PE and on Xarelto chronically. History of rectal cancer with metastasis to the liver -patient is currently under the care of Dr. Hardy. PET scan 10/25/2018 indicated no uptake in the lung area.  No nodules.  Hypermetabolic 2.5 cm lesion adjacent/to the right of the low-attenuation treatment bed in the hepatic dome, in keeping with persistent residual disease next to the treatment bed.  She continues to receive chemotherapy. She has had complaints of dyspnea on exertion with no clear etiology.   CT scan of chest 6/2018 showed no evidence of PE or masses or nodules. No metastasis. Hx of PE remains on Xarelto 10mg daily.   CT abdomen liver 3/4/2019 indicated right hepatic dome treated metastasis stable with no local recurrence.  New from 2017 subcapsular left hepatic 8 mm hypodense lesion does not appear to enhance.  Most likely a cyst.    PFT indicated normal spirometry 7/11/18 FEV1 2.43L or 115%, DLCO 98%.   PFT 7/17/2019 indicates normal findings with FEV1 2.37 L 114%, FEV1/FVC ratio 82, TLC 95% predicted, DLCO 97% predicted.  No significant bronchodilator response noted.  Patient remains on Stiolto and albuterol HFA inhaler.  She did take her Stiolto this morning.  Reviewed findings with patient.  Echo indicated normal LV function.   HRCT of chest was performed previously  to rule out ILD especially in light of her history of cancer and chemotherapy. This indicated emphysematous lungs and some areas of basilar atelectasis but no evidence of ILD.    She was started on Stiolto 2 puffs QD and MATEO. She feels her breathing has improved overall.     She continues to have chronic dyspnea with exertion. She denies nocturnal symptoms. She notes testing her levels at home with drops to 77% using her husbands pulse ox. She notes once she rests, O2 levels recover.  Room air sat was 97% today.  She can walk for about 30min and then needs to sit due to dyspnea.  She denies significant cough, phlegm, chest tightness or wheezing.    ROS: As per HPI and otherwise negative if not stated.    Past Medical History:   Diagnosis Date   • Arrhythmia     occasional   • Blood clotting disorder (HCC) 08/2015    bilat PE    • Blood transfusion 1957   • Breath shortness     no O2 with moderate exertion   • Cancer (HCC) 09/2012    rectal cancer,  Liver CA-2015   • CATARACT     removed b/l   • Chemotherapy-induced neutropenia (HCC) 9/28/2016   • Chickenpox    • Colon cancer (HCC)    • Dental disorder     dentures UPPERS AND LOWERS   • Elevated alkaline phosphatase level 11/15/2017   • Emphysema of lung (HCC)    • Fall    • [place] measles    • Heart murmur     as a child   • Hemorrhagic disorder (HCC)     on Xarelto    • Hypercholesteremia    • Hypertension     no meds currently    • Ileostomy in place (HCC)    • Indigestion    • Influenza    • Lightheadedness 9/17/2015   • Mumps    • Obstruction     ileostomy   • Other specified symptom associated with female genital organs     HYSTERECTOMY 1993   • Pneumonia 05/2017    tx'd with antibx   • Pulmonary embolism (HCC)    • 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)    • Renal insufficiency 3/14/2016   • Routine general medical examination at a health care facility 3/14/2016    3/14/16   • Seasonal allergies    • Swelling of both ankles     Lasix PRN   • Tonsillitis        Past Surgical History:   Procedure Laterality Date   • HEPATIC ABLATION LAPAROSCOPIC  11/15/2018    Procedure: HEPATIC ABLATION LAPAROSCOPIC.- SEGMENT 7/8;  Surgeon:  Kit West M.D.;  Location: SURGERY [facility];  Service: General   • COLONOSCOPY WITH BIOPSY  8/23/2015    Procedure: COLONOSCOPY WITH BIOPSY;  Surgeon: Wilbur Glasgow M.D.;  Location: ENDOSCOPY [facility]  Service:    • HEPATIC ABLATION LAPAROSCOPIC  7/6/2015    Procedure: HEPATIC ABLATION LAPAROSCOPIC.;  Surgeon: Kit West M.D.;  Location: SURGERY [facility];  Service:    • CATH PLACEMENT Left 7/6/2015    Procedure: CATH PLACEMENT CEPHALIC POWERPORT;  Surgeon: Kit West M.D.;  Location: SURGERY [facility];  Service:    • FISTULA IN ANO REPAIR  1/28/2015    Performed by Kolton Montgomery M.D. at [facility]   • PERINEAL PROCEDURE  1/28/2015    Performed by Kolton Montgomery M.D. at [facility]   • FISTULA IN ANO REPAIR  10/15/2014    Performed by Kolton Montgomery M.D. at [facility]   • PERINEAL PROCEDURE  10/15/2014    Performed by Kolton Montgomery M.D. at [facility]   • IRRIGATION & DEBRIDEMENT GENERAL  2/19/2014    Performed by Kolton Montgomery M.D. at [facility]   • FLAP GRAFT  2/19/2014    Performed by Kolton Montgomery M.D. at [facility]   • PERINEAL PROCEDURE  12/18/2013    Performed by Kolton Montgomery M.D. at [facility]   • MYOCUTANEOUS FLAP  12/18/2013    Performed by Kolton Montgomery M.D. at [facility]   • IRRIGATION & DEBRIDEMENT GENERAL  10/23/2013    Performed by Kolton Montgomery M.D. at [facility]   • FISTULA IN ANO REPAIR  9/4/2013    Performed by Kolton Montgomery M.D. at [facility]   • FLAP ROTATION  9/4/2013    Performed by Kolton Montgomery M.D. at [facility]   • RECOVERY  8/26/2013    Performed by Cath-Recovery Surgery at SURGERY SAME DAY [facility]   • BIOPSY GENERAL  7/17/2013    Performed by Kolton Montgomery M.D. at SURGERY [facility]   • PROCTOSCOPY  7/17/2013    Performed by Kolton Montgomery M.D. at [facility]    • FISTULA IN ANO REPAIR  2/27/2013    Performed by Kolton Montgomery M.D. at SURGERY [facility]   • SIGMOIDOSCOPY  2/27/2013    Performed by Kolton Montgomery M.D. at SURGERY [facility]   • LAPAROSCOPY  10/8/2012    Performed by Kolton Montgomery M.D. at SURGERY [facility]   • ILEO LOOP DIVERSION  10/8/2012    Performed by Kolton Montgomery M.D. at SURGERY [facility]   • COLECTOMY  9/26/2012    Performed by Kolton Montgomery M.D. at SURGERY [facility]   • COLONOSCOPY FLEX W/ENDO US  9/20/2012    Performed by Harshil Bolton M.D. at SURGERY [facility]   • OTHER  2009    left eye torn retina repair   • CATARACT EXTRACTION WITH IOL  2004    bilateral   • ABDOMINAL HYSTERECTOMY TOTAL  1983   • CYST EXCISION  1972    ovarian   • COLON RESECTION     • EYE SURGERY      cataracts   • HYSTERECTOMY LAPAROSCOPY     • PB REMV 2ND CATARACT,CORN-SCLER SECTN     • TONSILLECTOMY         Family History   Problem Relation Age of Onset   • Heart Disease Mother    • Heart Failure Mother    • Hypertension Mother    • Hyperlipidemia Mother    • Cancer Mother    • Cancer Maternal Aunt 60        breast ca   • Lung Disease Brother         COPD/Emphysema/Smoker   • Cancer Brother         prostate cancer   • Alcohol/Drug Brother         Alcohol   • Kidney Disease Father         renal failure       Social History     Social History   • Marital status:      Spouse name: N/A   • Number of children: N/A   • Years of education: N/A     Occupational History   • Not on file.     Social History Main Topics   • Smoking status: Never Smoker   • Smokeless tobacco: Never Used   • Alcohol use No      Comment: rare   • Drug use: No   • Sexual activity: No     Other Topics Concern   • Not on file     Social History Narrative   • No narrative on file       Allergies as of 07/17/2019 - Reviewed 07/17/2019   Allergen Reaction Noted   • Oxaliplatin Anaphylaxis 10/27/2015   • Codeine  04/05/2011   • Pcn [penicillins] Itching 04/05/2011   •  "Sulfa drugs Itching 04/05/2011   • Tape Rash 09/04/2013        @Vital signs for this encounter:  Vitals:    07/17/19 0953 07/17/19 0957   Height: 1.65 m (5' 4.96\")    Weight: 76.2 kg (168 lb)    Weight % change since last entry.: 0 %    BP: 140/64    Pulse: 90    BMI (Calculated): 27.99    Resp: 16    O2 sat % room air:  97 %       Current medications as of today   Current Outpatient Prescriptions   Medication Sig Dispense Refill   • furosemide (LASIX) 40 MG Tab Take 1 Tab by mouth every day. 90 Tab 3   • potassium chloride ER (KLOR-CON) 10 MEQ tablet Take 1 Tab by mouth every day. 90 Tab 3   • raNITidine (ZANTAC) 150 MG Tab Take 1 Tab by mouth 2 Times a Day. 60 Tab 3   • rivaroxaban (XARELTO) 10 MG Tab tablet Take 1 Tab by mouth every day. 90 Tab 1   • Tiotropium Bromide-Olodaterol (STIOLTO RESPIMAT) 2.5-2.5 MCG/ACT Aero Soln Take 2 Puffs by mouth every day. 1 Inhaler 11   • metronidazole (METROCREAM) 0.75 % cream Apply 1 Each to affected area(s) 2 times a day.     • loperamide (IMODIUM) 2 MG Cap Take 2 mg by mouth 4 times a day as needed for Diarrhea.     • cyanocobalamin (VITAMIN B-12) 500 MCG Tab Take 500 mcg by mouth every day.     • Multiple Vitamins-Minerals (CENTRUM SILVER PO) Take 1 Tab by mouth every day.     • Cholecalciferol (VITAMIN D3) 400 UNITS CAPS Take 1 Cap by mouth every day.     • loratadine (CLARITIN) 10 MG TABS Take 10 mg by mouth every day. Indications: Hayfever     • lidocaine-prilocaine (EMLA) 2.5-2.5 % Cream APPLY A THICK FILM OVER THE PORT ACCESS SITE PRIOR TO ACCESS 30 g 3   • ondansetron (ZOFRAN) 4 MG Tab tablet Take 1 Tab by mouth as needed. 30 Tab 3   • albuterol (PROAIR HFA) 108 (90 Base) MCG/ACT Aero Soln inhalation aerosol Inhale 2 Puffs by mouth every four hours as needed for Shortness of Breath (wheezing). 1 Inhaler 11     No current facility-administered medications for this visit.          Physical Exam:   Gen:           Alert and oriented, No apparent distress. Mood and affect " appropriate, normal interaction with examiner.  Eyes:          PERRL, EOM intact, sclere white, conjunctive moist.  Ears:          Not examined.   Hearing:     Grossly intact.  Nose:          Normal, no lesions or deformities.  Dentition:    Good dentition.  Oropharynx:   Tongue normal, posterior pharynx without erythema or exudate.  Mallampati Classification: 2/3  Neck:        Supple, trachea midline, no masses.  Respiratory Effort: No intercostal retractions or use of accessory muscles.   Lung Auscultation:      Clear to auscultation bilaterally; no rales, rhonchi or wheezing.  CV:            Regular rate and rhythm. No murmurs, rubs or gallops.  Abd:           Not examined.   Lymphadenopathy: Not examined.  Gait and Station: Normal.  Digits and Nails: No clubbing, cyanosis, petechiae, or nodes.   Cranial Nerves: II-XII grossly intact.  Skin:        No rashes, lesions or ulcers noted.               Ext:           No cyanosis or edema.      Assessment:  1. Pulmonary emphysema, unspecified emphysema type (HCC)     2. History of pulmonary embolism     3. Current use of long term anticoagulation     4. Rectal cancer (HCC)     5. Secondary malignant neoplasm of liver (HCC)     6. Essential hypertension     7. Nonsmoker     8. BMI 27.0-27.9,adult     9. Weight loss         Immunizations:    Flu:11/2018  Pneumovax 23:2006  Prevnar 13:2018    Plan:  1. Patient has chronic dyspnea, but no clear etiology. PFTs are normal. HRCT did not reveal ILD changes that can occur with long term chemo. I do not feel updating HRCT is necessary due to normal/stable lung function. It has not worsened, but only occurs on exertion. Symptoms improved with bronchodilator therapy but not resolved.  Multi ox in office indicated normal RA saturations on exertion.  DME CNOX on RA now. Advised patient to call/mychart for results.  2.  Discussed respiratory hygiene.  3.  Follow-up with oncology as scheduled.  4.  Follow-up with cardiology as  scheduled.  5.  Follow up in 6 mos to check symptoms, sooner if needed. If symptoms worsen, we will update HRCT.    Please note that this dictation was created using voice recognition software. I have made every reasonable attempt to correct obvious errors, but it is possible there are errors of grammar and possibly content that I did not discover before finalizing the note.

## 2019-07-18 ENCOUNTER — OUTPATIENT INFUSION SERVICES (OUTPATIENT)
Dept: ONCOLOGY | Facility: MEDICAL CENTER | Age: 79
End: 2019-07-18
Attending: INTERNAL MEDICINE
Payer: MEDICARE

## 2019-07-18 VITALS
WEIGHT: 167.55 LBS | DIASTOLIC BLOOD PRESSURE: 64 MMHG | HEIGHT: 65 IN | SYSTOLIC BLOOD PRESSURE: 128 MMHG | OXYGEN SATURATION: 98 % | RESPIRATION RATE: 18 BRPM | TEMPERATURE: 98 F | HEART RATE: 90 BPM | BODY MASS INDEX: 27.92 KG/M2

## 2019-07-18 PROCEDURE — 96523 IRRIG DRUG DELIVERY DEVICE: CPT

## 2019-07-18 PROCEDURE — 700111 HCHG RX REV CODE 636 W/ 250 OVERRIDE (IP)

## 2019-07-18 RX ADMIN — HEPARIN 500 UNITS: 100 SYRINGE at 10:45

## 2019-07-18 NOTE — PROGRESS NOTES
Pt arrived ambulatory to [facility] for CADD pump de-access. Pump reads total volume zero. Pt received 72.2ml which includes overfill. Port with brisk blood return, flushed with NS the heparin. Port de-accessed, gauze dressing applied. Pt discharged to self care. Pt aware of next appt 7/30/19.

## 2019-07-22 ENCOUNTER — TELEPHONE (OUTPATIENT)
Dept: SLEEP MEDICINE | Facility: MEDICAL CENTER | Age: 79
End: 2019-07-22

## 2019-07-22 DIAGNOSIS — R06.02 SOB (SHORTNESS OF BREATH): Primary | ICD-10-CM

## 2019-07-22 NOTE — TELEPHONE ENCOUNTER
Patient has tried top  OPO at [facility] in [place] twice and they have been closed both times. She will be in [place] next week for chemo and would like to do it with us.    Please sign pended order.

## 2019-07-29 ENCOUNTER — OFFICE VISIT (OUTPATIENT)
Dept: HEMATOLOGY ONCOLOGY | Facility: MEDICAL CENTER | Age: 79
End: 2019-07-29
Payer: MEDICARE

## 2019-07-29 VITALS
TEMPERATURE: 97.2 F | DIASTOLIC BLOOD PRESSURE: 64 MMHG | OXYGEN SATURATION: 97 % | RESPIRATION RATE: 18 BRPM | WEIGHT: 164.9 LBS | SYSTOLIC BLOOD PRESSURE: 116 MMHG | BODY MASS INDEX: 27.47 KG/M2 | HEART RATE: 116 BPM

## 2019-07-29 DIAGNOSIS — R06.02 SHORTNESS OF BREATH: ICD-10-CM

## 2019-07-29 DIAGNOSIS — C20 RECTAL CANCER (HCC): Chronic | ICD-10-CM

## 2019-07-29 DIAGNOSIS — C78.7 SECONDARY MALIGNANT NEOPLASM OF LIVER (HCC): Chronic | ICD-10-CM

## 2019-07-29 DIAGNOSIS — Z79.01 CURRENT USE OF LONG TERM ANTICOAGULATION: Chronic | ICD-10-CM

## 2019-07-29 PROCEDURE — 99213 OFFICE O/P EST LOW 20 MIN: CPT | Performed by: INTERNAL MEDICINE

## 2019-07-29 RX ORDER — ONDANSETRON 2 MG/ML
4 INJECTION INTRAMUSCULAR; INTRAVENOUS
Status: CANCELLED | OUTPATIENT
Start: 2019-07-30

## 2019-07-29 RX ORDER — DEXTROSE MONOHYDRATE 50 MG/ML
INJECTION, SOLUTION INTRAVENOUS CONTINUOUS
Status: CANCELLED | OUTPATIENT
Start: 2019-07-30

## 2019-07-29 RX ORDER — PROCHLORPERAZINE MALEATE 10 MG
10 TABLET ORAL EVERY 6 HOURS PRN
Status: CANCELLED | OUTPATIENT
Start: 2019-07-30

## 2019-07-29 RX ORDER — LIDOCAINE HYDROCHLORIDE 10 MG/ML
10 INJECTION, SOLUTION INFILTRATION; PERINEURAL ONCE
Status: CANCELLED | OUTPATIENT
Start: 2019-07-30 | End: 2019-07-30

## 2019-07-29 RX ORDER — DEXAMETHASONE SODIUM PHOSPHATE 4 MG/ML
8 INJECTION, SOLUTION INTRA-ARTICULAR; INTRALESIONAL; INTRAMUSCULAR; INTRAVENOUS; SOFT TISSUE ONCE
Status: CANCELLED | OUTPATIENT
Start: 2019-07-30 | End: 2019-07-30

## 2019-07-29 RX ORDER — ONDANSETRON 8 MG/1
8 TABLET, ORALLY DISINTEGRATING ORAL
Status: CANCELLED | OUTPATIENT
Start: 2019-07-30

## 2019-07-29 ASSESSMENT — PAIN SCALES - GENERAL: PAINLEVEL: NO PAIN

## 2019-07-29 NOTE — PROGRESS NOTES
"07/29/19    Subjective    Chief Complaint:  79 female from [place] f/u metastatic rectal cancer, KRAS mutated.     HPI:  Original dx 2012 T2N0 - post-op rectovaginal fistula and loop colostomy. 8/2015 mets to liver and small lung nodules. Liver ablation and then Xelox - allergic rx to oxaliplatin and continued single agent Xeloda -> diarrhea -> changed to 5FU 2015 after a 3 week hospitalization. Recurred in liver in 5/2017 and treated with Y90 embolization. In 10/2018 PET suggested recurrence adjacent to the treated liver lesion. Treated with microwave ablation (Dr. West). Has been on maintenance 5FU q.o.w since then. Also h/o PE in 2015, on low-dose Xarelto 10 mg daily.     ROS: Denies problems with head, eyes, ears. Is SOB on inhalers. Only GI c/o diarrhea via ileostomy. No  c/o's.    PMH:    Allergies   Allergen Reactions   • Oxaliplatin Anaphylaxis   • Codeine      \"gets drunk\"   • Pcn [Penicillins] Itching   • Sulfa Drugs Itching   • Tape Rash     PAPER TAPE OK       Medications:    Current Outpatient Prescriptions on File Prior to Visit   Medication Sig Dispense Refill   • furosemide (LASIX) 40 MG Tab Take 1 Tab by mouth every day. 90 Tab 3   • raNITidine (ZANTAC) 150 MG Tab Take 1 Tab by mouth 2 Times a Day. 60 Tab 3   • rivaroxaban (XARELTO) 10 MG Tab tablet Take 1 Tab by mouth every day. 90 Tab 1   • Tiotropium Bromide-Olodaterol (STIOLTO RESPIMAT) 2.5-2.5 MCG/ACT Aero Soln Take 2 Puffs by mouth every day. 1 Inhaler 11   • albuterol (PROAIR HFA) 108 (90 Base) MCG/ACT Aero Soln inhalation aerosol Inhale 2 Puffs by mouth every four hours as needed for Shortness of Breath (wheezing). 1 Inhaler 11   • metronidazole (METROCREAM) 0.75 % cream Apply 1 Each to affected area(s) 2 times a day.     • cyanocobalamin (VITAMIN B-12) 500 MCG Tab Take 1,000 mcg by mouth every day.     • Multiple Vitamins-Minerals (CENTRUM SILVER PO) Take 1 Tab by mouth every day.     • Cholecalciferol (VITAMIN D3) 400 UNITS " CAPS Take 1 Cap by mouth every day.     • loratadine (CLARITIN) 10 MG TABS Take 10 mg by mouth every day. Indications: Hayfever     • potassium chloride ER (KLOR-CON) 10 MEQ tablet Take 1 Tab by mouth every day. 90 Tab 3   • lidocaine-prilocaine (EMLA) 2.5-2.5 % Cream APPLY A THICK FILM OVER THE PORT ACCESS SITE PRIOR TO ACCESS 30 g 3   • ondansetron (ZOFRAN) 4 MG Tab tablet Take 1 Tab by mouth as needed. 30 Tab 3   • loperamide (IMODIUM) 2 MG Cap Take 2 mg by mouth 4 times a day as needed for Diarrhea.       No current facility-administered medications on file prior to visit.          Objective    Vitals:    /64 (BP Location: Right arm, Patient Position: Sitting, BP Cuff Size: Adult)   Pulse (!) 116   Temp 36.2 °C (97.2 °F) (Temporal)   Resp 18   Wt 74.8 kg (164 lb 14.5 oz)   LMP  (LMP Unknown)   SpO2 97%   BMI 27.47 kg/m²     Physical Exam: WD WN NAD    HEENT - PERRL  Neck - supple  Node - none  Chest - clear but breath sounds less audible on left. Mediport on left  Breasts - No mass  COR - RSR no murmur  Abd - No palpable liver, spleen. Ileostomy on right.  Ext - No edema  Skin - no rash or jaundice    Labs:  To be done tomorrow. Last CEA 1.3    Assessment    Imp:    Visit Diagnosis:    1. Rectal cancer (HCC)  CT-CHEST,ABDOMEN,PELVIS WITH   2. Secondary malignant neoplasm of liver (HCC)  CT-CHEST,ABDOMEN,PELVIS WITH   3. Current use of long term anticoagulation           Plan:    Chemo tomorrow. Add CT chest to abdomen already scheduled.     Lucho Steele M.D.

## 2019-07-29 NOTE — PROGRESS NOTES
"Pharmacy Chemotherapy Verification    Patient Name: Karlene Walters  Dx: Colon CA        Protocol: 5-FU + Leucovorin      IV over 2 hours on Day 1, followed by    IVP on Day 1, followed by         -- 10/31/17: delete Leucovorin and 5-FU push d/t neutropenia per Dr. Hardy   Fluorouracil   IV continuous infusion daily on Days 1 and 2 ( IV over 46-48 hours)        -- 20% reduction (1920 mg/m²) to be continued starting C28 per C Alsop APRN   Q14 day cycles for 12 cycles (adjuvant) or until disease progression or unacceptable toxicity  NCCN Guidelines for Colon Cancer. V.2.2015.  Jay GROVER, et al. Eur J Cancer.1999;35(9):1343-7.      Allergies:  Codeine; Oxaliplatin; Pcn; Sulfa drugs; and Tape       /65   Pulse 89   Temp 36.3 °C (97.4 °F) (Temporal)   Resp 18   Ht 1.65 m (5' 4.96\")   Wt 74.7 kg (164 lb 10.9 oz)   LMP  (LMP Unknown)   SpO2 98%   BMI 27.44 kg/m²  Body surface area is 1.85 meters squared.    Labs 7/30/19  ANC~3500       Hgb = 12.8         Plt = 226k     Labs 7/02/19  SCr = 1.09 mg/dL          CrCl~49 mL/min AST/ALT/AP = 30/22/155      Tbili = 0.7       Drug Order   (Drug name, dose, route, IV Fluid & volume, frequency, number of doses) Cycle 88 (C80 in [place])   Previous treatment: 7/16/19   Medication = Fluorouracil (5-FU)   Base Dose = 1920 mg/m²  Calc Dose:Base Dose x 1.85 m² = 3552 mg  Final Dose = 3550 mg   Route = CIVI  Drug Conc = 50 mg/mL  Fluid & Volume = 71 mL (+ 3 mL overfill)  Admin Duration = CIVI over 46 hrs @ 1.5 mL/hr   Via CADD pump for home infusion        <10% difference, OK to treat with final dose     By my signature below, I confirm this process was performed independently with the BSA and all final chemotherapy dosing calculations congruent. I have reviewed the above chemotherapy order and that my calculation of the final dose and BSA (when applicable) corroborate those calculations of the  pharmacist.     Lalit Andrade, PharmD, BCOP    "

## 2019-07-30 ENCOUNTER — HOME STUDY (OUTPATIENT)
Dept: SLEEP MEDICINE | Facility: MEDICAL CENTER | Age: 79
End: 2019-07-30
Attending: NURSE PRACTITIONER
Payer: MEDICARE

## 2019-07-30 ENCOUNTER — OUTPATIENT INFUSION SERVICES (OUTPATIENT)
Dept: ONCOLOGY | Facility: MEDICAL CENTER | Age: 79
End: 2019-07-30
Attending: INTERNAL MEDICINE
Payer: MEDICARE

## 2019-07-30 VITALS
TEMPERATURE: 97.4 F | SYSTOLIC BLOOD PRESSURE: 123 MMHG | OXYGEN SATURATION: 98 % | HEART RATE: 89 BPM | BODY MASS INDEX: 27.44 KG/M2 | RESPIRATION RATE: 18 BRPM | WEIGHT: 164.68 LBS | DIASTOLIC BLOOD PRESSURE: 65 MMHG | HEIGHT: 65 IN

## 2019-07-30 DIAGNOSIS — R94.4 DECREASED GFR: ICD-10-CM

## 2019-07-30 DIAGNOSIS — C20 RECTAL CANCER (HCC): ICD-10-CM

## 2019-07-30 DIAGNOSIS — R06.02 SOB (SHORTNESS OF BREATH): ICD-10-CM

## 2019-07-30 LAB
ALBUMIN SERPL BCP-MCNC: 3.9 G/DL (ref 3.2–4.9)
ALBUMIN/GLOB SERPL: 1.3 G/DL
ALP SERPL-CCNC: 155 U/L (ref 30–99)
ALT SERPL-CCNC: 22 U/L (ref 2–50)
ANION GAP SERPL CALC-SCNC: 11 MMOL/L (ref 0–11.9)
AST SERPL-CCNC: 30 U/L (ref 12–45)
BASOPHILS # BLD AUTO: 0.9 % (ref 0–1.8)
BASOPHILS # BLD: 0.05 K/UL (ref 0–0.12)
BILIRUB SERPL-MCNC: 0.7 MG/DL (ref 0.1–1.5)
BUN SERPL-MCNC: 22 MG/DL (ref 8–22)
CALCIUM SERPL-MCNC: 9.6 MG/DL (ref 8.5–10.5)
CEA SERPL-MCNC: 1.3 NG/ML (ref 0–3)
CHLORIDE SERPL-SCNC: 103 MMOL/L (ref 96–112)
CO2 SERPL-SCNC: 23 MMOL/L (ref 20–33)
CREAT SERPL-MCNC: 1.09 MG/DL (ref 0.5–1.4)
EOSINOPHIL # BLD AUTO: 0.19 K/UL (ref 0–0.51)
EOSINOPHIL NFR BLD: 3.4 % (ref 0–6.9)
ERYTHROCYTE [DISTWIDTH] IN BLOOD BY AUTOMATED COUNT: 61.9 FL (ref 35.9–50)
GLOBULIN SER CALC-MCNC: 3.1 G/DL (ref 1.9–3.5)
GLUCOSE SERPL-MCNC: 109 MG/DL (ref 65–99)
HCT VFR BLD AUTO: 39.9 % (ref 37–47)
HGB BLD-MCNC: 12.8 G/DL (ref 12–16)
IMM GRANULOCYTES # BLD AUTO: 0.02 K/UL (ref 0–0.11)
IMM GRANULOCYTES NFR BLD AUTO: 0.4 % (ref 0–0.9)
LYMPHOCYTES # BLD AUTO: 0.99 K/UL (ref 1–4.8)
LYMPHOCYTES NFR BLD: 17.7 % (ref 22–41)
MCH RBC QN AUTO: 29 PG (ref 27–33)
MCHC RBC AUTO-ENTMCNC: 32.1 G/DL (ref 33.6–35)
MCV RBC AUTO: 90.3 FL (ref 81.4–97.8)
MONOCYTES # BLD AUTO: 0.8 K/UL (ref 0–0.85)
MONOCYTES NFR BLD AUTO: 14.3 % (ref 0–13.4)
NEUTROPHILS # BLD AUTO: 3.54 K/UL (ref 2–7.15)
NEUTROPHILS NFR BLD: 63.3 % (ref 44–72)
NRBC # BLD AUTO: 0 K/UL
NRBC BLD-RTO: 0 /100 WBC
PLATELET # BLD AUTO: 226 K/UL (ref 164–446)
PMV BLD AUTO: 9.7 FL (ref 9–12.9)
POTASSIUM SERPL-SCNC: 3.7 MMOL/L (ref 3.6–5.5)
PROT SERPL-MCNC: 7 G/DL (ref 6–8.2)
RBC # BLD AUTO: 4.42 M/UL (ref 4.2–5.4)
SODIUM SERPL-SCNC: 137 MMOL/L (ref 135–145)
WBC # BLD AUTO: 5.6 K/UL (ref 4.8–10.8)

## 2019-07-30 PROCEDURE — 700111 HCHG RX REV CODE 636 W/ 250 OVERRIDE (IP): Performed by: INTERNAL MEDICINE

## 2019-07-30 PROCEDURE — 94762 N-INVAS EAR/PLS OXIMTRY CONT: CPT | Performed by: INTERNAL MEDICINE

## 2019-07-30 PROCEDURE — 700111 HCHG RX REV CODE 636 W/ 250 OVERRIDE (IP)

## 2019-07-30 PROCEDURE — 96375 TX/PRO/DX INJ NEW DRUG ADDON: CPT

## 2019-07-30 PROCEDURE — 80053 COMPREHEN METABOLIC PANEL: CPT

## 2019-07-30 PROCEDURE — 82378 CARCINOEMBRYONIC ANTIGEN: CPT

## 2019-07-30 PROCEDURE — A4212 NON CORING NEEDLE OR STYLET: HCPCS

## 2019-07-30 PROCEDURE — G0498 CHEMO EXTEND IV INFUS W/PUMP: HCPCS

## 2019-07-30 PROCEDURE — 96374 THER/PROPH/DIAG INJ IV PUSH: CPT

## 2019-07-30 PROCEDURE — 85025 COMPLETE CBC W/AUTO DIFF WBC: CPT

## 2019-07-30 RX ORDER — DEXAMETHASONE SODIUM PHOSPHATE 4 MG/ML
8 INJECTION, SOLUTION INTRA-ARTICULAR; INTRALESIONAL; INTRAMUSCULAR; INTRAVENOUS; SOFT TISSUE ONCE
Status: COMPLETED | OUTPATIENT
Start: 2019-07-30 | End: 2019-07-30

## 2019-07-30 RX ADMIN — DEXAMETHASONE SODIUM PHOSPHATE 8 MG: 4 INJECTION, SOLUTION INTRA-ARTICULAR; INTRALESIONAL; INTRAMUSCULAR; INTRAVENOUS; SOFT TISSUE at 10:24

## 2019-07-30 RX ADMIN — FLUOROURACIL 3550 MG: 50 INJECTION, SOLUTION INTRAVENOUS at 10:41

## 2019-07-30 NOTE — PROGRESS NOTES
Chemotherapy Verification - SECONDARY RN       Height = 64.96 inches  Weight = 74.7 kg  BSA = 1.85 m2       Medication: Fluorouracil (5FU)  Dose: 1920 mg/m2  Calculated Dose: 3,552 mg via CADD pump for CIVI over 46 hrs                             (In mg/m2, AUC, mg/kg)         I confirm that this process was performed independently.

## 2019-07-30 NOTE — PROGRESS NOTES
Chemotherapy Verification - SECONDARY RN       Height = 165 cm  Weight = 74.7 kg  BSA = 1.85 m2       Medication: Adrucil  Dose: 1,920 mg/m2  Calculated Dose: 3,552 mg over 46 hours                             (In mg/m2, AUC, mg/kg)         I confirm that this process was performed independently.

## 2019-07-30 NOTE — PROGRESS NOTES
"Pharmacy Chemotherapy Verification    Patient Name: Karlene Walters   Dx: Colon CA        Cycle 88 ([place] cycle 80)    Previous treatment: 7/2/19    Protocol: 5-FU + Leucovorin      IV over 2 hours on Day 1, followed by    IVP on Day 1, followed by                               -- 10/31/17: delete Leucovorin and 5-FU push d/t neutropenia per Dr. Hardy   Fluorouracil  continuous infusion over 46-48 hours                              -- 20% reduction (1920 mg/m²) to be continued starting C28 per C Alsop APRN   14 day cycles for 12 cycles (adjuvant) or until disease progression or unacceptable toxicity  NCCN Guidelines for Colon Cancer. V.2.2015.  Jay T, et al. Eur J Cancer.1999;35(9):1343-7.      Allergies: Codeine; Oxaliplatin; Pcn; Sulfa drugs; and Tape     /65   Pulse 89   Temp 36.3 °C (97.4 °F) (Temporal)   Resp 18   Ht 1.65 m (5' 4.96\")   Wt 74.7 kg (164 lb 10.9 oz)   LMP  (LMP Unknown)   SpO2 98%   BMI 27.44 kg/m²  Body surface area is 1.85 meters squared.    All lab results 7/30/19 within treatment parameters.       Fluorouracil (5-FU) 1920 mg/m² x 1.85m² = 3552mg              <10% difference, OK to treat with final dose = 3550 mg CIVI over 46 hrs    DARIN Arroyo Pharm.D.          "

## 2019-07-30 NOTE — PROGRESS NOTES
Pre medication administered per MD orders.  CADD pump, drug dose, and settings verified by RN's x 2.  Port flushed with normal saline; continued + blood return verified.  Pt connected to CADD pump for CIVI of 5FU over 46 hrs.  Pump observed to be running.  Pt left infusion services in no apparent distress after completion of treatment, ambulatory.  Pt to return on Thursday for pump disconnect; appointment scheduled and confirmed.

## 2019-07-30 NOTE — PROGRESS NOTES
Pt to infusion services ambulatory per self and accompanied by spouse.  Pt here for scheduled chemotherapy.  Plan of care reviewed.  Pt verbalizes understanding.  Pt reports no issues or concerns.  Pt with left chest port.  Port site cleansed and port accessed in sterile fashion.  Port flushes easily; +brisk blood return verified.  Labs drawn per MD orders.  Port flushed with normal saline per policy.  Blood samples sent to lab.  Pt resting in chair.  Call light at hand.

## 2019-07-31 NOTE — PROCEDURES
Interpretation:    This overnight oximetry was recorded on July 30, 2019 with the patient on room air.  The analysis duration was 9 hours 31 minutes.  62 total oximetric events occurred encompassing 47.3 minutes .  The average event duration was 45.8 seconds .  The saturations were less than 90% for 1.2 % of the recording.  The nancy saturation was 78 % .  The patient spent 3.3 minutes with saturations < 88%.  Overall, this continuous nocturnal oximetry demonstrates near normal saturation while breathing room air.    Recommendation:    Clinical correlation is needed.  The patient had only transient modest decreases in the saturation which do not warrant therapy.                            .

## 2019-08-01 ENCOUNTER — OUTPATIENT INFUSION SERVICES (OUTPATIENT)
Dept: ONCOLOGY | Facility: MEDICAL CENTER | Age: 79
End: 2019-08-01
Attending: INTERNAL MEDICINE
Payer: MEDICARE

## 2019-08-01 VITALS
OXYGEN SATURATION: 98 % | DIASTOLIC BLOOD PRESSURE: 61 MMHG | TEMPERATURE: 97 F | HEART RATE: 88 BPM | RESPIRATION RATE: 18 BRPM | HEIGHT: 65 IN | SYSTOLIC BLOOD PRESSURE: 117 MMHG | WEIGHT: 160.94 LBS | BODY MASS INDEX: 26.81 KG/M2

## 2019-08-01 DIAGNOSIS — R94.4 DECREASED GFR: ICD-10-CM

## 2019-08-01 DIAGNOSIS — C20 RECTAL CANCER (HCC): ICD-10-CM

## 2019-08-01 PROCEDURE — 96523 IRRIG DRUG DELIVERY DEVICE: CPT

## 2019-08-01 PROCEDURE — 700111 HCHG RX REV CODE 636 W/ 250 OVERRIDE (IP)

## 2019-08-01 RX ADMIN — HEPARIN 500 UNITS: 100 SYRINGE at 11:38

## 2019-08-01 NOTE — PROGRESS NOTES
Pt to infusion services ambulatory per self.  Pt here for scheduled disconnect of CIVI of 5FU via CADD pump.  Plan of care reviewed.  Pt verbalizes understanding.  Pt reports no issues or concerns.  Pump settings reviewed.  Reservoir volume = 0.0 mL and amount given = 72.4 mL.  Tubing clamped, pump turned off, and pump disconnected from patient.  Port flushed with normal saline and heparin per policy; continued + blood return verified.  Port de-accessed; needle intact.  Pressure dressing applied.  Pt left infusion services in no apparent distress after completion of treatment, ambulatory.  Next appointment scheduled.

## 2019-08-12 NOTE — PROGRESS NOTES
"Pharmacy Chemotherapy Verification    Patient Name: Karlene Walters   Dx: Colon CA        Cycle 89 ([place] cycle 81)    Previous treatment: 7/30/19    Protocol: 5-FU + Leucovorin      IV over 2 hours on Day 1, followed by    IVP on Day 1, followed by                               -- 10/31/17: delete Leucovorin and 5-FU push d/t neutropenia per Dr. Hardy   Fluorouracil  continuous infusion over 46-48 hours                              -- 20% reduction (1920 mg/m²) to be continued starting C28 per C Alsop APRN   14 day cycles for 12 cycles (adjuvant) or until disease progression or unacceptable toxicity  NCCN Guidelines for Colon Cancer. V.2.2015.  Jay T, et al. Eur J Cancer.1999;35(9):1343-7.      Allergies: Codeine; Oxaliplatin; Pcn; Sulfa drugs; and Tape       /66   Pulse 90   Temp 36.7 °C (98 °F) (Temporal)   Resp 18   Ht 1.65 m (5' 4.96\")   Wt 75 kg (165 lb 5.5 oz)   LMP  (LMP Unknown)   SpO2 98%   BMI 27.55 kg/m²  Body surface area is 1.85 meters squared.    Labs 08/13/19  ANC~3300       Hgb = 11.5         Plt = 187k     Labs 7/30/19  SCr = 1.09 mg/dL          CrCl~49 mL/min     AST/ALT/AP = 30/22/155      Tbili = 0.7      Fluorouracil (5-FU) 1920 mg/m² x 1.85m² = 3552mg              <10% difference, OK to treat with final dose = 3550 mg CIVI via CADD pump @ 1.5 mL/hr over 46 hrs.      Lalit Andrade, PharmD, BCOP            "

## 2019-08-13 ENCOUNTER — OUTPATIENT INFUSION SERVICES (OUTPATIENT)
Dept: ONCOLOGY | Facility: MEDICAL CENTER | Age: 79
End: 2019-08-13
Attending: NURSE PRACTITIONER
Payer: MEDICARE

## 2019-08-13 VITALS
RESPIRATION RATE: 18 BRPM | DIASTOLIC BLOOD PRESSURE: 66 MMHG | SYSTOLIC BLOOD PRESSURE: 135 MMHG | HEART RATE: 90 BPM | BODY MASS INDEX: 27.55 KG/M2 | TEMPERATURE: 98 F | HEIGHT: 65 IN | OXYGEN SATURATION: 98 % | WEIGHT: 165.34 LBS

## 2019-08-13 DIAGNOSIS — R94.4 DECREASED GFR: ICD-10-CM

## 2019-08-13 DIAGNOSIS — C20 RECTAL CANCER (HCC): ICD-10-CM

## 2019-08-13 LAB
BASOPHILS # BLD AUTO: 0.8 % (ref 0–1.8)
BASOPHILS # BLD: 0.04 K/UL (ref 0–0.12)
EOSINOPHIL # BLD AUTO: 0.18 K/UL (ref 0–0.51)
EOSINOPHIL NFR BLD: 3.6 % (ref 0–6.9)
ERYTHROCYTE [DISTWIDTH] IN BLOOD BY AUTOMATED COUNT: 61.7 FL (ref 35.9–50)
HCT VFR BLD AUTO: 36.6 % (ref 37–47)
HGB BLD-MCNC: 11.5 G/DL (ref 12–16)
IMM GRANULOCYTES # BLD AUTO: 0.01 K/UL (ref 0–0.11)
IMM GRANULOCYTES NFR BLD AUTO: 0.2 % (ref 0–0.9)
LYMPHOCYTES # BLD AUTO: 0.94 K/UL (ref 1–4.8)
LYMPHOCYTES NFR BLD: 18.6 % (ref 22–41)
MCH RBC QN AUTO: 28.8 PG (ref 27–33)
MCHC RBC AUTO-ENTMCNC: 31.4 G/DL (ref 33.6–35)
MCV RBC AUTO: 91.5 FL (ref 81.4–97.8)
MONOCYTES # BLD AUTO: 0.58 K/UL (ref 0–0.85)
MONOCYTES NFR BLD AUTO: 11.5 % (ref 0–13.4)
NEUTROPHILS # BLD AUTO: 3.3 K/UL (ref 2–7.15)
NEUTROPHILS NFR BLD: 65.3 % (ref 44–72)
NRBC # BLD AUTO: 0 K/UL
NRBC BLD-RTO: 0 /100 WBC
PLATELET # BLD AUTO: 187 K/UL (ref 164–446)
PMV BLD AUTO: 10.2 FL (ref 9–12.9)
RBC # BLD AUTO: 4 M/UL (ref 4.2–5.4)
WBC # BLD AUTO: 5.1 K/UL (ref 4.8–10.8)

## 2019-08-13 PROCEDURE — 96374 THER/PROPH/DIAG INJ IV PUSH: CPT | Mod: XU

## 2019-08-13 PROCEDURE — 700111 HCHG RX REV CODE 636 W/ 250 OVERRIDE (IP)

## 2019-08-13 PROCEDURE — 85025 COMPLETE CBC W/AUTO DIFF WBC: CPT

## 2019-08-13 PROCEDURE — A4212 NON CORING NEEDLE OR STYLET: HCPCS

## 2019-08-13 PROCEDURE — 700111 HCHG RX REV CODE 636 W/ 250 OVERRIDE (IP): Mod: JG | Performed by: NURSE PRACTITIONER

## 2019-08-13 PROCEDURE — 36593 DECLOT VASCULAR DEVICE: CPT

## 2019-08-13 PROCEDURE — G0498 CHEMO EXTEND IV INFUS W/PUMP: HCPCS

## 2019-08-13 PROCEDURE — 700111 HCHG RX REV CODE 636 W/ 250 OVERRIDE (IP): Performed by: NURSE PRACTITIONER

## 2019-08-13 RX ORDER — DEXTROSE MONOHYDRATE 50 MG/ML
INJECTION, SOLUTION INTRAVENOUS CONTINUOUS
Status: CANCELLED | OUTPATIENT
Start: 2019-08-13

## 2019-08-13 RX ORDER — DEXAMETHASONE SODIUM PHOSPHATE 4 MG/ML
8 INJECTION, SOLUTION INTRA-ARTICULAR; INTRALESIONAL; INTRAMUSCULAR; INTRAVENOUS; SOFT TISSUE ONCE
Status: CANCELLED | OUTPATIENT
Start: 2019-08-13

## 2019-08-13 RX ORDER — DEXAMETHASONE SODIUM PHOSPHATE 4 MG/ML
8 INJECTION, SOLUTION INTRA-ARTICULAR; INTRALESIONAL; INTRAMUSCULAR; INTRAVENOUS; SOFT TISSUE ONCE
Status: COMPLETED | OUTPATIENT
Start: 2019-08-13 | End: 2019-08-13

## 2019-08-13 RX ORDER — ONDANSETRON 2 MG/ML
4 INJECTION INTRAMUSCULAR; INTRAVENOUS
Status: CANCELLED | OUTPATIENT
Start: 2019-08-13

## 2019-08-13 RX ORDER — PROCHLORPERAZINE MALEATE 10 MG
10 TABLET ORAL EVERY 6 HOURS PRN
Status: CANCELLED | OUTPATIENT
Start: 2019-08-13

## 2019-08-13 RX ORDER — LIDOCAINE HYDROCHLORIDE 10 MG/ML
10 INJECTION, SOLUTION INFILTRATION; PERINEURAL ONCE
Status: CANCELLED | OUTPATIENT
Start: 2019-08-13

## 2019-08-13 RX ORDER — ONDANSETRON 8 MG/1
8 TABLET, ORALLY DISINTEGRATING ORAL
Status: CANCELLED | OUTPATIENT
Start: 2019-08-13

## 2019-08-13 RX ADMIN — ALTEPLASE 2 MG: 2.2 INJECTION, POWDER, LYOPHILIZED, FOR SOLUTION INTRAVENOUS at 09:51

## 2019-08-13 RX ADMIN — FLUOROURACIL 3550 MG: 50 INJECTION, SOLUTION INTRAVENOUS at 11:36

## 2019-08-13 RX ADMIN — DEXAMETHASONE SODIUM PHOSPHATE 8 MG: 4 INJECTION, SOLUTION INTRA-ARTICULAR; INTRALESIONAL; INTRAMUSCULAR; INTRAVENOUS; SOFT TISSUE at 10:57

## 2019-08-13 ASSESSMENT — PAIN SCALES - GENERAL: PAINLEVEL: NO PAIN

## 2019-08-13 NOTE — PROGRESS NOTES
Paitient here for CADD home pump connect. Port accessed using sterile technique; no blood return noted. Port de-accessed and re-accessed; no blood return noted. Orders received for alteplase. Alteplase given per MAR. Blood return noted 30 minutes after alteplase administration. Labs drawn as ordered and reviewed today. Patient connected to 5FU CADD pump running at 1.5 ml/hr. Total volume with overfill = 72.4 ml. Minimum to infuse = 71 ml. Next appointment scheduled. Discharged to self care; no apparent distress noted.

## 2019-08-13 NOTE — PROGRESS NOTES
Chemotherapy Verification - PRIMARY RN      Height = 165 cm  Weight = 75 kg  BSA = 1.85 m^2       Medication: fluorouracil  Dose: 1920 mg/m^2  Calculated Dose: 3552 mg over 46 hours                            (In mg/m2, AUC, mg/kg)     I confirm this process was performed independently with the BSA and all final chemotherapy dosing calculations congruent.  Any discrepancies of 10% or greater have been addressed with the chemotherapy pharmacist. The resolution of the discrepancy has been documented in the EPIC progress notes.

## 2019-08-13 NOTE — PROGRESS NOTES
Chemotherapy Verification - SECONDARY RN       Height = 165 cm  Weight = 75 kg  BSA = 1.85 m2       Medication: Home infusion 5FU  Dose: 1920 mg/m2 dr  Calculated Dose: 3559.7 mg                             (In mg/m2, AUC, mg/kg)     I confirm that this process was performed independently.

## 2019-08-13 NOTE — PROGRESS NOTES
"Pharmacy Chemotherapy Verification    Patient Name: Karlene Walters  Dx: Colon CA        Protocol: 5-FU + Leucovorin      IV over 2 hours on Day 1, followed by    IVP on Day 1, followed by         -- 10/31/17: delete Leucovorin and 5-FU push d/t neutropenia per Dr. Hardy   Fluorouracil   IV continuous infusion daily on Days 1 and 2 ( IV over 46-48 hours)        -- 20% reduction (1920 mg/m²) to be continued starting C28 per C Alsop APRN   Q14 day cycles for 12 cycles (adjuvant) or until disease progression or unacceptable toxicity  NCCN Guidelines for Colon Cancer. V.2.2015.  Jay T, et al. Eur J Cancer.1999;35(9):1343-7.      Allergies:  Codeine; Oxaliplatin; Pcn; Sulfa drugs; and Tape     /66   Pulse 90   Temp 36.7 °C (98 °F) (Temporal)   Resp 18   Ht 1.65 m (5' 4.96\")   Wt 75 kg (165 lb 5.5 oz)   LMP  (LMP Unknown)   SpO2 98%   BMI 27.55 kg/m²  Body surface area is 1.85 meters squared.    Labs 8/13/19  ANC~ 3300 Plt = 187k   Hgb = 11.5       Labs 7/30/19   SCr = 1.09 mg/dL CrCl ~ 49.3 mL/min   LFT's = 30/22/155 TBili = 0.7       Drug Order   (Drug name, dose, route, IV Fluid & volume, frequency, number of doses) Cycle 89 (C81 in [place])   Previous treatment: 7/30/19   Medication = Fluorouracil (5-FU)   Base Dose = 1920 mg/m²  Calc Dose:Base Dose x 1.85 m² = 3552 mg  Final Dose = 3550 mg   Route = CIVI  Drug Conc = 50 mg/mL  Fluid & Volume = 71 mL (+ 3 mL overfill = 74 mL)  Admin Duration = CIVI over 46 hrs @ 1.5 mL/hr   Via CADD pump for home infusion        <10% difference, OK to treat with final dose     By my signature below, I confirm this process was performed independently with the BSA and all final chemotherapy dosing calculations congruent. I have reviewed the above chemotherapy order and that my calculation of the final dose and BSA (when applicable) corroborate those calculations of the  pharmacist.     Demetrius H. Erica, PharmD    "

## 2019-08-15 ENCOUNTER — OUTPATIENT INFUSION SERVICES (OUTPATIENT)
Dept: ONCOLOGY | Facility: MEDICAL CENTER | Age: 79
End: 2019-08-15
Attending: INTERNAL MEDICINE
Payer: MEDICARE

## 2019-08-15 VITALS
HEART RATE: 90 BPM | HEIGHT: 65 IN | WEIGHT: 167.55 LBS | TEMPERATURE: 98 F | RESPIRATION RATE: 18 BRPM | OXYGEN SATURATION: 98 % | SYSTOLIC BLOOD PRESSURE: 127 MMHG | BODY MASS INDEX: 27.92 KG/M2 | DIASTOLIC BLOOD PRESSURE: 70 MMHG

## 2019-08-15 DIAGNOSIS — R94.4 DECREASED GFR: ICD-10-CM

## 2019-08-15 DIAGNOSIS — C20 RECTAL CANCER (HCC): ICD-10-CM

## 2019-08-15 PROCEDURE — 96523 IRRIG DRUG DELIVERY DEVICE: CPT

## 2019-08-15 PROCEDURE — 700111 HCHG RX REV CODE 636 W/ 250 OVERRIDE (IP): Performed by: NURSE PRACTITIONER

## 2019-08-15 RX ADMIN — HEPARIN 500 UNITS: 100 SYRINGE at 11:40

## 2019-08-15 NOTE — PROGRESS NOTES
Patient presents for home infusion pump disconnect. Pump states empty and volume 0.0. Home pump disconnected and stored in pharmacy. Port flushed per protocol with brisk blood return and de-accessed. Sterile gauze and paper tape to site. Patient scheduled for next appointment and released in no acute distress.

## 2019-08-19 ENCOUNTER — TELEPHONE (OUTPATIENT)
Dept: PULMONOLOGY | Facility: HOSPICE | Age: 79
End: 2019-08-19

## 2019-08-19 NOTE — TELEPHONE ENCOUNTER
1. Caller Name: Karlene                      Call Back Number: [phone] (home)       2. Message: Karlene called and wanted to know he OPO results.  Notified pt on what the result note said.  Pt also had a HSS done. Please advise on both results.     3. Patient approves office to leave a detailed voicemail/MyChart message: yes per pt.

## 2019-08-19 NOTE — TELEPHONE ENCOUNTER
Patient only had OPO done.  Overall normal oxygen levels. Patient was only low for 3.3min of study. She does not qualify for night time O2 use.

## 2019-08-27 ENCOUNTER — OUTPATIENT INFUSION SERVICES (OUTPATIENT)
Dept: ONCOLOGY | Facility: MEDICAL CENTER | Age: 79
End: 2019-08-27
Attending: INTERNAL MEDICINE
Payer: MEDICARE

## 2019-08-27 ENCOUNTER — OFFICE VISIT (OUTPATIENT)
Dept: HEMATOLOGY ONCOLOGY | Facility: MEDICAL CENTER | Age: 79
End: 2019-08-27
Payer: MEDICARE

## 2019-08-27 VITALS
HEIGHT: 65 IN | TEMPERATURE: 97.5 F | BODY MASS INDEX: 27.88 KG/M2 | SYSTOLIC BLOOD PRESSURE: 106 MMHG | HEART RATE: 91 BPM | DIASTOLIC BLOOD PRESSURE: 54 MMHG | RESPIRATION RATE: 16 BRPM | OXYGEN SATURATION: 97 % | WEIGHT: 167.33 LBS

## 2019-08-27 VITALS
DIASTOLIC BLOOD PRESSURE: 72 MMHG | TEMPERATURE: 97 F | SYSTOLIC BLOOD PRESSURE: 137 MMHG | RESPIRATION RATE: 18 BRPM | OXYGEN SATURATION: 100 % | HEART RATE: 109 BPM | BODY MASS INDEX: 27.88 KG/M2 | WEIGHT: 167.33 LBS | HEIGHT: 65 IN

## 2019-08-27 VITALS
HEIGHT: 65 IN | WEIGHT: 167.33 LBS | HEART RATE: 109 BPM | SYSTOLIC BLOOD PRESSURE: 137 MMHG | TEMPERATURE: 97 F | BODY MASS INDEX: 27.88 KG/M2 | OXYGEN SATURATION: 100 % | DIASTOLIC BLOOD PRESSURE: 72 MMHG | RESPIRATION RATE: 18 BRPM

## 2019-08-27 DIAGNOSIS — C20 RECTAL CANCER (HCC): ICD-10-CM

## 2019-08-27 DIAGNOSIS — R94.4 DECREASED GFR: ICD-10-CM

## 2019-08-27 DIAGNOSIS — C78.7 SECONDARY MALIGNANT NEOPLASM OF LIVER (HCC): Chronic | ICD-10-CM

## 2019-08-27 DIAGNOSIS — R06.02 SHORTNESS OF BREATH: ICD-10-CM

## 2019-08-27 DIAGNOSIS — C20 RECTAL CANCER (HCC): Chronic | ICD-10-CM

## 2019-08-27 LAB
ALBUMIN SERPL BCP-MCNC: 3.9 G/DL (ref 3.2–4.9)
ALBUMIN/GLOB SERPL: 1.4 G/DL
ALP SERPL-CCNC: 138 U/L (ref 30–99)
ALT SERPL-CCNC: 23 U/L (ref 2–50)
ANION GAP SERPL CALC-SCNC: 12 MMOL/L (ref 0–11.9)
AST SERPL-CCNC: 29 U/L (ref 12–45)
BASOPHILS # BLD AUTO: 1.4 % (ref 0–1.8)
BASOPHILS # BLD: 0.08 K/UL (ref 0–0.12)
BILIRUB SERPL-MCNC: 0.8 MG/DL (ref 0.1–1.5)
BUN SERPL-MCNC: 14 MG/DL (ref 8–22)
CALCIUM SERPL-MCNC: 9.4 MG/DL (ref 8.5–10.5)
CEA SERPL-MCNC: 1.2 NG/ML (ref 0–3)
CHLORIDE SERPL-SCNC: 106 MMOL/L (ref 96–112)
CO2 SERPL-SCNC: 21 MMOL/L (ref 20–33)
CREAT SERPL-MCNC: 1.18 MG/DL (ref 0.5–1.4)
EOSINOPHIL # BLD AUTO: 0.23 K/UL (ref 0–0.51)
EOSINOPHIL NFR BLD: 4.1 % (ref 0–6.9)
ERYTHROCYTE [DISTWIDTH] IN BLOOD BY AUTOMATED COUNT: 60.2 FL (ref 35.9–50)
GLOBULIN SER CALC-MCNC: 2.8 G/DL (ref 1.9–3.5)
GLUCOSE SERPL-MCNC: 113 MG/DL (ref 65–99)
HCT VFR BLD AUTO: 41.3 % (ref 37–47)
HGB BLD-MCNC: 12.5 G/DL (ref 12–16)
IMM GRANULOCYTES # BLD AUTO: 0.04 K/UL (ref 0–0.11)
IMM GRANULOCYTES NFR BLD AUTO: 0.7 % (ref 0–0.9)
LYMPHOCYTES # BLD AUTO: 1.22 K/UL (ref 1–4.8)
LYMPHOCYTES NFR BLD: 21.6 % (ref 22–41)
MCH RBC QN AUTO: 27.7 PG (ref 27–33)
MCHC RBC AUTO-ENTMCNC: 30.3 G/DL (ref 33.6–35)
MCV RBC AUTO: 91.6 FL (ref 81.4–97.8)
MONOCYTES # BLD AUTO: 0.69 K/UL (ref 0–0.85)
MONOCYTES NFR BLD AUTO: 12.2 % (ref 0–13.4)
NEUTROPHILS # BLD AUTO: 3.4 K/UL (ref 2–7.15)
NEUTROPHILS NFR BLD: 60 % (ref 44–72)
NRBC # BLD AUTO: 0 K/UL
NRBC BLD-RTO: 0 /100 WBC
PLATELET # BLD AUTO: 249 K/UL (ref 164–446)
PMV BLD AUTO: 10 FL (ref 9–12.9)
POTASSIUM SERPL-SCNC: 3.6 MMOL/L (ref 3.6–5.5)
PROT SERPL-MCNC: 6.7 G/DL (ref 6–8.2)
RBC # BLD AUTO: 4.51 M/UL (ref 4.2–5.4)
SODIUM SERPL-SCNC: 139 MMOL/L (ref 135–145)
WBC # BLD AUTO: 5.7 K/UL (ref 4.8–10.8)

## 2019-08-27 PROCEDURE — 700111 HCHG RX REV CODE 636 W/ 250 OVERRIDE (IP)

## 2019-08-27 PROCEDURE — 80053 COMPREHEN METABOLIC PANEL: CPT

## 2019-08-27 PROCEDURE — 96374 THER/PROPH/DIAG INJ IV PUSH: CPT

## 2019-08-27 PROCEDURE — A4212 NON CORING NEEDLE OR STYLET: HCPCS

## 2019-08-27 PROCEDURE — 36591 DRAW BLOOD OFF VENOUS DEVICE: CPT

## 2019-08-27 PROCEDURE — 85025 COMPLETE CBC W/AUTO DIFF WBC: CPT

## 2019-08-27 PROCEDURE — 99213 OFFICE O/P EST LOW 20 MIN: CPT | Performed by: NURSE PRACTITIONER

## 2019-08-27 PROCEDURE — 82378 CARCINOEMBRYONIC ANTIGEN: CPT

## 2019-08-27 PROCEDURE — G0498 CHEMO EXTEND IV INFUS W/PUMP: HCPCS

## 2019-08-27 PROCEDURE — 700111 HCHG RX REV CODE 636 W/ 250 OVERRIDE (IP): Performed by: NURSE PRACTITIONER

## 2019-08-27 PROCEDURE — 306780 HCHG STAT FOR TRANSFUSION PER CASE

## 2019-08-27 PROCEDURE — 96375 TX/PRO/DX INJ NEW DRUG ADDON: CPT

## 2019-08-27 RX ORDER — DEXAMETHASONE SODIUM PHOSPHATE 4 MG/ML
8 INJECTION, SOLUTION INTRA-ARTICULAR; INTRALESIONAL; INTRAMUSCULAR; INTRAVENOUS; SOFT TISSUE ONCE
Status: COMPLETED | OUTPATIENT
Start: 2019-08-27 | End: 2019-08-27

## 2019-08-27 RX ORDER — PROCHLORPERAZINE MALEATE 10 MG
10 TABLET ORAL EVERY 6 HOURS PRN
Status: CANCELLED | OUTPATIENT
Start: 2019-08-27

## 2019-08-27 RX ORDER — LIDOCAINE HYDROCHLORIDE 10 MG/ML
10 INJECTION, SOLUTION INFILTRATION; PERINEURAL ONCE
Status: CANCELLED | OUTPATIENT
Start: 2019-08-27

## 2019-08-27 RX ORDER — DEXTROSE MONOHYDRATE 50 MG/ML
INJECTION, SOLUTION INTRAVENOUS CONTINUOUS
Status: CANCELLED | OUTPATIENT
Start: 2019-08-27

## 2019-08-27 RX ORDER — DEXAMETHASONE SODIUM PHOSPHATE 4 MG/ML
8 INJECTION, SOLUTION INTRA-ARTICULAR; INTRALESIONAL; INTRAMUSCULAR; INTRAVENOUS; SOFT TISSUE ONCE
Status: CANCELLED | OUTPATIENT
Start: 2019-08-27

## 2019-08-27 RX ORDER — ONDANSETRON 8 MG/1
8 TABLET, ORALLY DISINTEGRATING ORAL
Status: CANCELLED | OUTPATIENT
Start: 2019-08-27

## 2019-08-27 RX ORDER — ONDANSETRON 2 MG/ML
4 INJECTION INTRAMUSCULAR; INTRAVENOUS
Status: CANCELLED | OUTPATIENT
Start: 2019-08-27

## 2019-08-27 RX ADMIN — HEPARIN 500 UNITS: 100 SYRINGE at 07:12

## 2019-08-27 RX ADMIN — DEXAMETHASONE SODIUM PHOSPHATE 8 MG: 4 INJECTION, SOLUTION INTRA-ARTICULAR; INTRALESIONAL; INTRAMUSCULAR; INTRAVENOUS; SOFT TISSUE at 09:02

## 2019-08-27 RX ADMIN — FLUOROURACIL 3570 MG: 50 INJECTION, SOLUTION INTRAVENOUS at 09:33

## 2019-08-27 ASSESSMENT — ENCOUNTER SYMPTOMS
SHORTNESS OF BREATH: 1
MYALGIAS: 0
VOMITING: 0
PALPITATIONS: 1
FEVER: 0
DIARRHEA: 0
CONSTIPATION: 0
WHEEZING: 0
HEMOPTYSIS: 0
DIZZINESS: 0
COUGH: 0
WEIGHT LOSS: 0
CHILLS: 0
HEADACHES: 0
NAUSEA: 0

## 2019-08-27 ASSESSMENT — PAIN SCALES - GENERAL: PAINLEVEL: NO PAIN

## 2019-08-27 NOTE — PROGRESS NOTES
Chemotherapy Verification - PRIMARY RN      Height = 164.5 cm  Weight = 75.9 kg   BSA = 1.86 m2       Medication: Fluorouracil CADD pump  Dose: 1920 mg/m2  Calculated Dose: 3571.2 mg over 46 hours                             (In mg/m2, AUC, mg/kg)     I confirm this process was performed independently with the BSA and all final chemotherapy dosing calculations congruent.  Any discrepancies of 10% or greater have been addressed with the chemotherapy pharmacist. The resolution of the discrepancy has been documented in the EPIC progress notes.

## 2019-08-27 NOTE — PROGRESS NOTES
"Pharmacy Chemotherapy Verification    Patient Name: Karlene Walters  Dx: Colon CA        Protocol: 5-FU + Leucovorin      IV over 2 hours on Day 1, followed by    IVP on Day 1, followed by         -- 10/31/17: delete Leucovorin and 5-FU push d/t neutropenia per Dr. Hardy   Fluorouracil   IV continuous infusion daily on Days 1 and 2 ( IV over 46-48 hours)        -- 20% reduction (1920 mg/m²) to be continued starting C28 per C Alsop APRN   Q14 day cycles for 12 cycles (adjuvant) or until disease progression or unacceptable toxicity  NCCN Guidelines for Colon Cancer. V.2.2015.  Jay T, et al. Eur J Cancer.1999;35(9):1343-7.      Allergies:  Codeine; Oxaliplatin; Pcn; Sulfa drugs; and Tape     /72 Comment: Transfered from earlier this morning.  Pulse (!) 109 Comment: Transfered from earlier this morning.  Temp 36.1 °C (97 °F) (Temporal) Comment: Transfered from earlier this morning. Comment (Src): Transfered from earlier this morning.  Resp 18 Comment: Transfered from earlier this morning.  Ht 1.645 m (5' 4.76\") Comment: Transfered from earlier this morning.  Wt 75.9 kg (167 lb 5.3 oz) Comment: Transfered from earlier this morning.  LMP  (LMP Unknown)   SpO2 100% Comment: Transfered from earlier this morning.  BMI 28.05 kg/m²  Body surface area is 1.86 meters squared.    Labs 8/27/19  ANC~ 3400 Plt = 249k   Hgb = 12.5  SCr = 1.18 mg/dL CrCl ~ 46 mL/min   LFT's = 29/23/138 TBili = 0.8     Drug Order   (Drug name, dose, route, IV Fluid & volume, frequency, number of doses) Cycle 90 (C82 in [place])   Previous treatment: 8/13/19   Medication = Fluorouracil (5-FU)   Base Dose = 1920 mg/m²  Calc Dose:Base Dose x 1.86 m² = 3571.2 mg  Final Dose = 3570 mg   Route = CIVI  Drug Conc = 50 mg/mL  Fluid & Volume = 71.4 mL (+3 mL overfill = 74.4 mL)  Admin Duration = CIVI over 46 hrs @ 1.6 mL/hr   Via CADD pump for home infusion        <10% difference, OK to treat with final dose     By my signature below, I " confirm this process was performed independently with the BSA and all final chemotherapy dosing calculations congruent. I have reviewed the above chemotherapy order and that my calculation of the final dose and BSA (when applicable) corroborate those calculations of the  pharmacist.     Demetrius Maldonado, PharmD

## 2019-08-27 NOTE — PROGRESS NOTES
Chemotherapy Verification - SECONDARY RN       Height = 164.5 cm  Weight = 75.9 kg  BSA = 1.86 m2       Medication: 5FU CADD pump  Dose: 1920 mg/m2  Calculated Dose: 3571.2 mg                             (In mg/m2, AUC, mg/kg)     I confirm that this process was performed independently.

## 2019-08-27 NOTE — PROGRESS NOTES
Pt arrived to [facility] for pre-chemotherapy labs.  POC discussed.  L chest port in place with EMLA cream.  Accessed in sterile field, brisk blood return noted.  Labs drawn as ordered and sent to lab for processing.  Port flushed per policy and left accessed for return use.  Pt discharged is NAD, returns at 0845.

## 2019-08-27 NOTE — PROGRESS NOTES
"Subjective:      Karlene Walters is a 79 y.o. 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  She presents accompanied by her  for today's visit.  Patient has been on single agent 5-FU tolerating it very well and is scheduled for cycle #82 today.    Clinically she is stable.  She does have intermittent fatigue at times but it is tolerable.  She denies fevers or chills or weight loss.  Appetite is been stable but states sometimes food just does not taste good.  Patient still with complaint of shortness of breath more so with activity.  She has had significant work-up including pulmonary and cardiology work-up with no abnormal finding.  She denies coughing, wheezing or hemoptysis.  She does note some heart palpations at times but denies any chest pain.  She denies nausea, vomiting, diarrhea or constipation at this time.  Her output via her ostomy is within normal limits per her normal routine.  She does note some nausea intermittently following her chemotherapy but it is well controlled with medication as needed.  She voids without difficulty denies any pain, dizziness or headaches.  Patient denies any cracking or peeling of her hands or feet.  She denies any mouth sores.    Patient is followed by cardiology and pulmonary.  She recently underwent a sleep study which was negative for any need for home oxygen therapy.  She does see Dr. Haque with cardiology.  She does take Lasix every once a while for lower extremity edema along with potassium.  According to the patient her cardiologist would like for her to take potassium daily however she has been taking it just a few times per week.    I did personally review patient's CBC and CMP labs with her.  During appointment her CEA was pending.    Allergies   Allergen Reactions   • Oxaliplatin Anaphylaxis   • Codeine      \"gets drunk\"   • " Pcn [Penicillins] Itching   • Sulfa Drugs Itching   • Tape Rash     PAPER TAPE OK     Current Outpatient Medications on File Prior to Visit   Medication Sig Dispense Refill   • furosemide (LASIX) 40 MG Tab Take 1 Tab by mouth every day. 90 Tab 3   • potassium chloride ER (KLOR-CON) 10 MEQ tablet Take 1 Tab by mouth every day. 90 Tab 3   • raNITidine (ZANTAC) 150 MG Tab Take 1 Tab by mouth 2 Times a Day. 60 Tab 3   • rivaroxaban (XARELTO) 10 MG Tab tablet Take 1 Tab by mouth every day. 90 Tab 1   • lidocaine-prilocaine (EMLA) 2.5-2.5 % Cream APPLY A THICK FILM OVER THE PORT ACCESS SITE PRIOR TO ACCESS 30 g 3   • ondansetron (ZOFRAN) 4 MG Tab tablet Take 1 Tab by mouth as needed. 30 Tab 3   • Tiotropium Bromide-Olodaterol (STIOLTO RESPIMAT) 2.5-2.5 MCG/ACT Aero Soln Take 2 Puffs by mouth every day. 1 Inhaler 11   • albuterol (PROAIR HFA) 108 (90 Base) MCG/ACT Aero Soln inhalation aerosol Inhale 2 Puffs by mouth every four hours as needed for Shortness of Breath (wheezing). 1 Inhaler 11   • metronidazole (METROCREAM) 0.75 % cream Apply 1 Each to affected area(s) 2 times a day.     • loperamide (IMODIUM) 2 MG Cap Take 2 mg by mouth 4 times a day as needed for Diarrhea.     • cyanocobalamin (VITAMIN B-12) 500 MCG Tab Take 1,000 mcg by mouth every day.     • Multiple Vitamins-Minerals (CENTRUM SILVER PO) Take 1 Tab by mouth every day.     • Cholecalciferol (VITAMIN D3) 400 UNITS CAPS Take 1 Cap by mouth every day.     • loratadine (CLARITIN) 10 MG TABS Take 10 mg by mouth every day. Indications: Hayfever       No current facility-administered medications on file prior to visit.        Review of Systems   Constitutional: Positive for malaise/fatigue (intermittently). Negative for chills, fever and weight loss.   Respiratory: Positive for shortness of breath (still present and more so with activity). Negative for cough, hemoptysis and wheezing.    Cardiovascular: Positive for palpitations (at times). Negative for chest  "pain.   Gastrointestinal: Negative for constipation, diarrhea, nausea and vomiting.        Nausea intermittently   Genitourinary: Negative for dysuria.   Musculoskeletal: Negative for myalgias.   Neurological: Negative for dizziness and headaches.          Objective:     /54 (BP Location: Right arm, Patient Position: Sitting, BP Cuff Size: Adult)   Pulse 91   Temp 36.4 °C (97.5 °F) (Temporal)   Resp 16   Ht 1.645 m (5' 4.76\")   Wt 75.9 kg (167 lb 5.3 oz)   LMP  (LMP Unknown)   SpO2 97%   BMI 28.05 kg/m²      Physical Exam   Constitutional: She is oriented to person, place, and time. She appears well-developed and well-nourished. No distress.   HENT:   Head: Normocephalic and atraumatic.   Mouth/Throat: Oropharynx is clear and moist. No oropharyngeal exudate.   Cardiovascular: Normal rate, regular rhythm, normal heart sounds and intact distal pulses. Exam reveals no gallop and no friction rub.   No murmur heard.  Pulmonary/Chest: Effort normal and breath sounds normal. No respiratory distress. She has no wheezes.   Abdominal: Soft. Bowel sounds are normal. She exhibits no distension. There is no tenderness.   Musculoskeletal: Normal range of motion. She exhibits no edema or tenderness.   Neurological: She is alert and oriented to person, place, and time.   Skin: Skin is warm and dry. No rash noted. She is not diaphoretic. No erythema. No pallor.   Psychiatric: She has a normal mood and affect. Her behavior is normal.   Vitals reviewed.    Results for KANG HALL (MRN [id number]) as of 8/27/2019 08:23   Ref. Range 8/27/2019 07:10   WBC Latest Ref Range: 4.8 - 10.8 K/uL 5.7   RBC Latest Ref Range: 4.20 - 5.40 M/uL 4.51   Hemoglobin Latest Ref Range: 12.0 - 16.0 g/dL 12.5   Hematocrit Latest Ref Range: 37.0 - 47.0 % 41.3   MCV Latest Ref Range: 81.4 - 97.8 fL 91.6   MCH Latest Ref Range: 27.0 - 33.0 pg 27.7   MCHC Latest Ref Range: 33.6 - 35.0 g/dL 30.3 (L)   RDW Latest Ref Range: 35.9 - 50.0 fL 60.2 " (H)   Platelet Count Latest Ref Range: 164 - 446 K/uL 249   MPV Latest Ref Range: 9.0 - 12.9 fL 10.0   Neutrophils-Polys Latest Ref Range: 44.00 - 72.00 % 60.00   Neutrophils (Absolute) Latest Ref Range: 2.00 - 7.15 K/uL 3.40   Lymphocytes Latest Ref Range: 22.00 - 41.00 % 21.60 (L)   Lymphs (Absolute) Latest Ref Range: 1.00 - 4.80 K/uL 1.22   Monocytes Latest Ref Range: 0.00 - 13.40 % 12.20   Monos (Absolute) Latest Ref Range: 0.00 - 0.85 K/uL 0.69   Eosinophils Latest Ref Range: 0.00 - 6.90 % 4.10   Eos (Absolute) Latest Ref Range: 0.00 - 0.51 K/uL 0.23   Basophils Latest Ref Range: 0.00 - 1.80 % 1.40   Baso (Absolute) Latest Ref Range: 0.00 - 0.12 K/uL 0.08   Immature Granulocytes Latest Ref Range: 0.00 - 0.90 % 0.70   Immature Granulocytes (abs) Latest Ref Range: 0.00 - 0.11 K/uL 0.04   Nucleated RBC Latest Units: /100 WBC 0.00   NRBC (Absolute) Latest Units: K/uL 0.00   Sodium Latest Ref Range: 135 - 145 mmol/L 139   Potassium Latest Ref Range: 3.6 - 5.5 mmol/L 3.6   Chloride Latest Ref Range: 96 - 112 mmol/L 106   Co2 Latest Ref Range: 20 - 33 mmol/L 21   Anion Gap Latest Ref Range: 0.0 - 11.9  12.0 (H)   Glucose Latest Ref Range: 65 - 99 mg/dL 113 (H)   Bun Latest Ref Range: 8 - 22 mg/dL 14   Creatinine Latest Ref Range: 0.50 - 1.40 mg/dL 1.18   GFR If  Latest Ref Range: >60 mL/min/1.73 m 2 53 (A)   GFR If Non  Latest Ref Range: >60 mL/min/1.73 m 2 44 (A)   Calcium Latest Ref Range: 8.5 - 10.5 mg/dL 9.4   AST(SGOT) Latest Ref Range: 12 - 45 U/L 29   ALT(SGPT) Latest Ref Range: 2 - 50 U/L 23   Alkaline Phosphatase Latest Ref Range: 30 - 99 U/L 138 (H)   Total Bilirubin Latest Ref Range: 0.1 - 1.5 mg/dL 0.8   Albumin Latest Ref Range: 3.2 - 4.9 g/dL 3.9   Total Protein Latest Ref Range: 6.0 - 8.2 g/dL 6.7   Globulin Latest Ref Range: 1.9 - 3.5 g/dL 2.8   A-G Ratio Latest Units: g/dL 1.4   Carcinoembryonic Antigen Latest Ref Range: 0.0 - 3.0 ng/mL 1.2          Assessment/Plan:      1. Rectal cancer (HCC)     2. Secondary malignant neoplasm of liver (HCC)     3. Shortness of breath       Plan  1.  Patient with metastatic rectal carcinoma to liver currently on maintenance therapy with single agent 5-FU.  She is tolerated this very well and is continued on this medication every 2 weeks.  Most recent CBC and CMP and CEA labs were evaluated.  Her CEA remains stable at 1.2.  Her labs are appropriate to proceed with treatment as planned.  Clinically she is stable and okay to proceed with treatment today.    2.  Patient is due for follow-up scans.  Surgeon, Dr. West has ordered abdominal CT with liver protocol and Dr. Steele has added CT chest for further evaluation due to shortness of breath.  Patient is scheduled for her CAT scans on September 9.  She is following up with surgeon in his office after CT scans.    3.  Patient with continued shortness of breath.  She has had significant work-up including cardiology and pulmonary evaluations.  CT chest has been added to scans and will evaluate chest in September as planned.    4.  Patient to continue with single agent 5-FU at this time.  She is receiving treatment every 2 weeks and following up in our clinic every 4 weeks.  She will be seen in 4 weeks or sooner if needed.

## 2019-08-27 NOTE — PROGRESS NOTES
Pt arrives to [facility] after visit with CARLOS Krishnan.  Labs reviewed and pt meets parameters for treatment today.  L chest port was left accessed after this mornings port draw.  Port flushed with NS and brisk blood return observed.  Dexamethasone IVP given as pre-med.  Port flushed and blood return noted.  5FU CADD pump connected to patient.  All clamps are open and CADD pump is functioning properly.  Confirmed next appt with patient.  Pt left on foot with spouse.

## 2019-08-27 NOTE — PROGRESS NOTES
"Pharmacy Chemotherapy Verification    Patient Name: Karlene Walters   Dx: Colon CA        Cycle 90 ([place] cycle 82)    Previous treatment: 7/30/19    Protocol: 5-FU + Leucovorin      IV over 2 hours on Day 1, followed by    IVP on Day 1, followed by                               -- 10/31/17: delete Leucovorin and 5-FU push d/t neutropenia per Dr. Hardy   Fluorouracil  continuous infusion over 46-48 hours                              -- 20% reduction (1920 mg/m²) to be continued starting C28 per C Alsop APRN   14 day cycles for 12 cycles (adjuvant) or until disease progression or unacceptable toxicity  NCCN Guidelines for Colon Cancer. V.2.2015.  Jay T, et al. Eur J Cancer.1999;35(9):1343-7.      Allergies: Codeine; Oxaliplatin; Pcn; Sulfa drugs; and Tape       /72 Comment: Transfered from earlier this morning.  Pulse (!) 109 Comment: Transfered from earlier this morning.  Temp 36.1 °C (97 °F) (Temporal) Comment: Transfered from earlier this morning. Comment (Src): Transfered from earlier this morning.  Resp 18 Comment: Transfered from earlier this morning.  Ht 1.645 m (5' 4.76\") Comment: Transfered from earlier this morning.  Wt 75.9 kg (167 lb 5.3 oz) Comment: Transfered from earlier this morning.  LMP  (LMP Unknown)   SpO2 100% Comment: Transfered from earlier this morning.  BMI 28.05 kg/m²  Body surface area is 1.86 meters squared.    All lab results 8/27/19 within treatment parameters.       Fluorouracil (5-FU) 1920 mg/m² x 1.86m² = 3571mg              <10% difference, OK to treat with final dose = 3570mg CIVI via CADD pump @ 1.6mL/hr over 46 hrs.      DARIN Arroyo Pharm.D.              "

## 2019-08-28 ENCOUNTER — OFFICE VISIT (OUTPATIENT)
Dept: MEDICAL GROUP | Facility: MEDICAL CENTER | Age: 79
End: 2019-08-28
Payer: MEDICARE

## 2019-08-28 VITALS
TEMPERATURE: 97.9 F | OXYGEN SATURATION: 96 % | HEART RATE: 79 BPM | BODY MASS INDEX: 29.02 KG/M2 | WEIGHT: 170 LBS | HEIGHT: 64 IN | SYSTOLIC BLOOD PRESSURE: 110 MMHG | DIASTOLIC BLOOD PRESSURE: 64 MMHG

## 2019-08-28 DIAGNOSIS — R06.02 SHORTNESS OF BREATH: ICD-10-CM

## 2019-08-28 DIAGNOSIS — R73.9 HYPERGLYCEMIA: ICD-10-CM

## 2019-08-28 DIAGNOSIS — R74.8 ELEVATED ALKALINE PHOSPHATASE LEVEL: ICD-10-CM

## 2019-08-28 DIAGNOSIS — I10 ESSENTIAL HYPERTENSION: ICD-10-CM

## 2019-08-28 DIAGNOSIS — C20 RECTAL CANCER (HCC): Chronic | ICD-10-CM

## 2019-08-28 DIAGNOSIS — E78.5 HYPERLIPIDEMIA, UNSPECIFIED HYPERLIPIDEMIA TYPE: ICD-10-CM

## 2019-08-28 DIAGNOSIS — N28.9 RENAL INSUFFICIENCY: ICD-10-CM

## 2019-08-28 DIAGNOSIS — D64.9 ANEMIA, UNSPECIFIED TYPE: ICD-10-CM

## 2019-08-28 DIAGNOSIS — D70.1 CHEMOTHERAPY-INDUCED NEUTROPENIA (HCC): ICD-10-CM

## 2019-08-28 DIAGNOSIS — Z79.01 CURRENT USE OF LONG TERM ANTICOAGULATION: Chronic | ICD-10-CM

## 2019-08-28 DIAGNOSIS — C78.7 SECONDARY MALIGNANT NEOPLASM OF LIVER (HCC): Chronic | ICD-10-CM

## 2019-08-28 DIAGNOSIS — Z86.711 HISTORY OF PULMONARY EMBOLISM: Chronic | ICD-10-CM

## 2019-08-28 DIAGNOSIS — T45.1X5A CHEMOTHERAPY-INDUCED NEUTROPENIA (HCC): ICD-10-CM

## 2019-08-28 PROCEDURE — 99214 OFFICE O/P EST MOD 30 MIN: CPT | Performed by: INTERNAL MEDICINE

## 2019-08-28 RX ORDER — POTASSIUM CHLORIDE 750 MG/1
CAPSULE, EXTENDED RELEASE ORAL
Refills: 3 | COMMUNITY
Start: 2019-06-18 | End: 2019-12-31

## 2019-08-29 ENCOUNTER — OUTPATIENT INFUSION SERVICES (OUTPATIENT)
Dept: ONCOLOGY | Facility: MEDICAL CENTER | Age: 79
End: 2019-08-29
Attending: INTERNAL MEDICINE
Payer: MEDICARE

## 2019-08-29 VITALS
TEMPERATURE: 97.5 F | BODY MASS INDEX: 28.36 KG/M2 | HEIGHT: 65 IN | RESPIRATION RATE: 18 BRPM | OXYGEN SATURATION: 95 % | WEIGHT: 170.19 LBS | DIASTOLIC BLOOD PRESSURE: 65 MMHG | SYSTOLIC BLOOD PRESSURE: 117 MMHG | HEART RATE: 99 BPM

## 2019-08-29 PROCEDURE — 700111 HCHG RX REV CODE 636 W/ 250 OVERRIDE (IP)

## 2019-08-29 PROCEDURE — 96523 IRRIG DRUG DELIVERY DEVICE: CPT

## 2019-08-29 RX ADMIN — Medication 500 UNITS: at 10:12

## 2019-08-29 NOTE — ASSESSMENT & PLAN NOTE
She has noticed some recent mild shortness of breath.  She will be getting a chest CT in the near future to follow-up on that.

## 2019-08-29 NOTE — ASSESSMENT & PLAN NOTE
She has a history of mild anemia thought related to chemotherapy.  Most recent hemoglobin hematocrit are 12.5 and 41.3 respectively.

## 2019-08-29 NOTE — PROGRESS NOTES
Pt returns to [facility] for 5FU CADD pump disconnect.  Pump completed infusion earlier then scheduled appt.  Pt called [facility] to come in for pump disconnection.  CADD pump volume is complete.  L chest port flushed with NS and heparin per protocol.  Brisk blood return observed during port flush.  Low profile needle removed intact.  Port site covered with gauze and paper tape.  Confirmed next appt with patient.  Pt dc home to self care.

## 2019-08-29 NOTE — ASSESSMENT & PLAN NOTE
History of stage IV rectal cancer being followed by Dr. Steele and Jenna.  Her CEA currently is 1.2.  She will be getting abdominal and chest CT scans for follow-up in the near future.

## 2019-08-29 NOTE — PROGRESS NOTES
Subjective:     Chief Complaint   Patient presents with   • Follow-Up     6 months      Karlene Walters is a 79 y.o. female here today for follow-up of her hypertension and hyperlipidemia and anemia and mild dyspnea and metastatic rectal cancer.  She is accompanied by her .    History of pulmonary embolism  No evidence of recurrence of pulmonary embolus.  She remains anticoagulated with Xarelto.  She denies unusual bleeding or bruising.    Rectal cancer (HCC)  History of stage IV rectal cancer being followed by Dr. Steele and Jenna.  Her CEA currently is 1.2.  She will be getting abdominal and chest CT scans for follow-up in the near future.    Secondary malignant neoplasm of liver (HCC)  Rectal cancer is metastatic to liver.  Alkaline phosphatase remains a little elevated at 138.    Current use of long term anticoagulation  She is currently anticoagulated with Xarelto for her history of prior DVT.  She denies unusual bleeding or bruising.    Shortness of breath  She has noticed some recent mild shortness of breath.  She will be getting a chest CT in the near future to follow-up on that.    Hypertension  She has a history of mild hypertension.  Blood pressure currently is satisfactory.  She tries to follow a low-salt diet.    Anemia  She has a history of mild anemia thought related to chemotherapy.  Most recent hemoglobin hematocrit are 12.5 and 41.3 respectively.    Hyperlipidemia  She has a history of hyperlipidemia that is treated primarily with diet.  She has not had a recent lipid panel.    Renal insufficiency  She has renal insufficiency with most recent GFR 44.  She is encouraged to remain well-hydrated.    Hyperglycemia  Blood sugar has been mildly elevated at 113.  She does consume sweets.    Chemotherapy-induced neutropenia  She has had some chemotherapy-induced neutropenia but white count currently is satisfactory.    Elevated alkaline phosphatase level  Alkaline phosphatase remains  elevated at 138.  She has history of known hepatic metastases.       Diagnoses of Anemia, unspecified type, Chemotherapy-induced neutropenia (HCC), Elevated alkaline phosphatase level, Hyperglycemia, Hyperlipidemia, unspecified hyperlipidemia type, Essential hypertension, Rectal cancer (HCC), Renal insufficiency, Secondary malignant neoplasm of liver (HCC), Current use of long term anticoagulation, History of pulmonary embolism, and Shortness of breath were pertinent to this visit.    Allergies: Oxaliplatin; Codeine; Pcn [penicillins]; Sulfa drugs; and Tape  Current medicines (including changes today)  Current Outpatient Medications   Medication Sig Dispense Refill   • furosemide (LASIX) 40 MG Tab Take 1 Tab by mouth every day. 90 Tab 3   • potassium chloride ER (KLOR-CON) 10 MEQ tablet Take 1 Tab by mouth every day. 90 Tab 3   • raNITidine (ZANTAC) 150 MG Tab Take 1 Tab by mouth 2 Times a Day. 60 Tab 3   • rivaroxaban (XARELTO) 10 MG Tab tablet Take 1 Tab by mouth every day. 90 Tab 1   • lidocaine-prilocaine (EMLA) 2.5-2.5 % Cream APPLY A THICK FILM OVER THE PORT ACCESS SITE PRIOR TO ACCESS 30 g 3   • ondansetron (ZOFRAN) 4 MG Tab tablet Take 1 Tab by mouth as needed. 30 Tab 3   • Tiotropium Bromide-Olodaterol (STIOLTO RESPIMAT) 2.5-2.5 MCG/ACT Aero Soln Take 2 Puffs by mouth every day. 1 Inhaler 11   • albuterol (PROAIR HFA) 108 (90 Base) MCG/ACT Aero Soln inhalation aerosol Inhale 2 Puffs by mouth every four hours as needed for Shortness of Breath (wheezing). 1 Inhaler 11   • metronidazole (METROCREAM) 0.75 % cream Apply 1 Each to affected area(s) 2 times a day.     • loperamide (IMODIUM) 2 MG Cap Take 2 mg by mouth 4 times a day as needed for Diarrhea.     • cyanocobalamin (VITAMIN B-12) 500 MCG Tab Take 1,000 mcg by mouth every day.     • Multiple Vitamins-Minerals (CENTRUM SILVER PO) Take 1 Tab by mouth every day.     • Cholecalciferol (VITAMIN D3) 400 UNITS CAPS Take 1 Cap by mouth every day.     • loratadine  "(CLARITIN) 10 MG TABS Take 10 mg by mouth every day. Indications: Hayfever       No current facility-administered medications for this visit.        She  has a past medical history of Arrhythmia, Blood clotting disorder (HCC) (08/2015), Blood transfusion (1957), Breath shortness, Cancer (HCC) (09/2012), CATARACT, Chemotherapy-induced neutropenia (HCC) (9/28/2016), Chickenpox, Colon cancer (HCC), Dental disorder, Elevated alkaline phosphatase level (11/15/2017), Emphysema of lung (HCC), Fall, [place] measles, Heart murmur, Hemorrhagic disorder (HCC), Hypercholesteremia, Hypertension, Ileostomy in place (HCC), Indigestion, Influenza, Lightheadedness (9/17/2015), Mumps, Obstruction, Other specified symptom associated with female genital organs, Pneumonia (05/2017), Pulmonary embolism (HCC), 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    ROS    Patient denies significant change in strength, weight or appetite.  No significant lightheadedness or headaches.  No change in vision, hearing, or swallowing.  No new dyspnea, coughing, chest pain, or palpitations except she does report some mild exertional dyspnea.  No indigestion, abdominal pain, or change in bowel habits.  No change in urinating.  No new ankle swelling.       Objective:     PE:  /64 (BP Location: Right arm, Patient Position: Sitting)   Pulse 79   Temp 36.6 °C (97.9 °F)   Ht 1.626 m (5' 4\")   Wt 77.1 kg (170 lb)   LMP  (LMP Unknown)   SpO2 96%   BMI 29.18 kg/m²    Neck is supple without significant lymphadenopathy or masses.  Lungs are clear with normal breath sounds without wheezes or rales .  Left lung fields are more resident than right.  Cardiovascular: peripheral circulation is satisfactory, heart sounds are unchanged and unremarkable.  Abdomen is soft, without masses or " tenderness, with normal bowel sounds.  Extremities are without significant edema, cyanosis or deformity.      Assessment and Plan:   The following treatment plan was discussed  1. Anemia, unspecified type      Oncology is following blood counts regularly.   2. Chemotherapy-induced neutropenia (HCC)      Oncology is following blood counts regularly.   3. Elevated alkaline phosphatase level      Continue to monitor alkaline phosphatase and other hepatic enzymes.   4. Hyperglycemia      She will try to cut back on eating concentrated sweets.   5. Hyperlipidemia, unspecified hyperlipidemia type      She will continue appropriate diet.  I do not think she is a good candidate for statins currently.   6. Essential hypertension      Follow low-salt diet.   7. Rectal cancer (HCC)      Follow-up closely with oncology.   8. Renal insufficiency      She is encouraged to remain well-hydrated.   9. Secondary malignant neoplasm of liver (HCC)      Awaiting repeat abdominal CT results.   10. Current use of long term anticoagulation      Continue Xarelto.  Report any unusual bleeding or bruising.   11. History of pulmonary embolism      Continue Xarelto.   12. Shortness of breath      Await results of chest CT.       Followup: Return in about 6 months (around 2/28/2020), or health  wellness, for Long.

## 2019-08-29 NOTE — ASSESSMENT & PLAN NOTE
She is currently anticoagulated with Xarelto for her history of prior DVT.  She denies unusual bleeding or bruising.

## 2019-08-29 NOTE — ASSESSMENT & PLAN NOTE
She has a history of mild hypertension.  Blood pressure currently is satisfactory.  She tries to follow a low-salt diet.

## 2019-08-29 NOTE — ASSESSMENT & PLAN NOTE
She has a history of hyperlipidemia that is treated primarily with diet.  She has not had a recent lipid panel.

## 2019-08-29 NOTE — ASSESSMENT & PLAN NOTE
No evidence of recurrence of pulmonary embolus.  She remains anticoagulated with Xarelto.  She denies unusual bleeding or bruising.

## 2019-09-09 ENCOUNTER — HOSPITAL ENCOUNTER (OUTPATIENT)
Dept: RADIOLOGY | Facility: MEDICAL CENTER | Age: 79
End: 2019-09-09
Attending: INTERNAL MEDICINE
Payer: MEDICARE

## 2019-09-09 ENCOUNTER — HOSPITAL ENCOUNTER (OUTPATIENT)
Dept: RADIOLOGY | Facility: MEDICAL CENTER | Age: 79
End: 2019-09-09
Attending: SURGERY
Payer: MEDICARE

## 2019-09-09 ENCOUNTER — OUTPATIENT INFUSION SERVICES (OUTPATIENT)
Dept: ONCOLOGY | Facility: MEDICAL CENTER | Age: 79
End: 2019-09-09
Attending: INTERNAL MEDICINE
Payer: MEDICARE

## 2019-09-09 VITALS
SYSTOLIC BLOOD PRESSURE: 132 MMHG | WEIGHT: 168.43 LBS | OXYGEN SATURATION: 99 % | BODY MASS INDEX: 28.06 KG/M2 | HEIGHT: 65 IN | DIASTOLIC BLOOD PRESSURE: 83 MMHG | RESPIRATION RATE: 18 BRPM | HEART RATE: 90 BPM | TEMPERATURE: 97 F

## 2019-09-09 DIAGNOSIS — C78.7 SECONDARY MALIGNANT NEOPLASM OF LIVER (HCC): Chronic | ICD-10-CM

## 2019-09-09 DIAGNOSIS — K76.9 LIVER DISEASE: ICD-10-CM

## 2019-09-09 DIAGNOSIS — C20 RECTAL CANCER (HCC): Chronic | ICD-10-CM

## 2019-09-09 DIAGNOSIS — C78.7 SECONDARY MALIGNANT NEOPLASM OF LIVER (HCC): ICD-10-CM

## 2019-09-09 LAB
ALBUMIN SERPL BCP-MCNC: 3.8 G/DL (ref 3.2–4.9)
ALBUMIN/GLOB SERPL: 1.4 G/DL
ALP SERPL-CCNC: 125 U/L (ref 30–99)
ALT SERPL-CCNC: 21 U/L (ref 2–50)
ANION GAP SERPL CALC-SCNC: 12 MMOL/L (ref 0–11.9)
AST SERPL-CCNC: 28 U/L (ref 12–45)
BASOPHILS # BLD AUTO: 0.9 % (ref 0–1.8)
BASOPHILS # BLD: 0.05 K/UL (ref 0–0.12)
BILIRUB SERPL-MCNC: 0.7 MG/DL (ref 0.1–1.5)
BUN SERPL-MCNC: 16 MG/DL (ref 8–22)
CALCIUM SERPL-MCNC: 9.3 MG/DL (ref 8.5–10.5)
CEA SERPL-MCNC: 1.1 NG/ML (ref 0–3)
CHLORIDE SERPL-SCNC: 109 MMOL/L (ref 96–112)
CO2 SERPL-SCNC: 21 MMOL/L (ref 20–33)
CREAT SERPL-MCNC: 0.98 MG/DL (ref 0.5–1.4)
EOSINOPHIL # BLD AUTO: 0.23 K/UL (ref 0–0.51)
EOSINOPHIL NFR BLD: 4.3 % (ref 0–6.9)
ERYTHROCYTE [DISTWIDTH] IN BLOOD BY AUTOMATED COUNT: 59.1 FL (ref 35.9–50)
GLOBULIN SER CALC-MCNC: 2.8 G/DL (ref 1.9–3.5)
GLUCOSE SERPL-MCNC: 99 MG/DL (ref 65–99)
HCT VFR BLD AUTO: 38.9 % (ref 37–47)
HGB BLD-MCNC: 12.2 G/DL (ref 12–16)
IMM GRANULOCYTES # BLD AUTO: 0.03 K/UL (ref 0–0.11)
IMM GRANULOCYTES NFR BLD AUTO: 0.6 % (ref 0–0.9)
LYMPHOCYTES # BLD AUTO: 1.04 K/UL (ref 1–4.8)
LYMPHOCYTES NFR BLD: 19.5 % (ref 22–41)
MCH RBC QN AUTO: 28.4 PG (ref 27–33)
MCHC RBC AUTO-ENTMCNC: 31.4 G/DL (ref 33.6–35)
MCV RBC AUTO: 90.7 FL (ref 81.4–97.8)
MONOCYTES # BLD AUTO: 0.76 K/UL (ref 0–0.85)
MONOCYTES NFR BLD AUTO: 14.3 % (ref 0–13.4)
NEUTROPHILS # BLD AUTO: 3.21 K/UL (ref 2–7.15)
NEUTROPHILS NFR BLD: 60.4 % (ref 44–72)
NRBC # BLD AUTO: 0 K/UL
NRBC BLD-RTO: 0 /100 WBC
PLATELET # BLD AUTO: 224 K/UL (ref 164–446)
PMV BLD AUTO: 10.5 FL (ref 9–12.9)
POTASSIUM SERPL-SCNC: 3.6 MMOL/L (ref 3.6–5.5)
PROT SERPL-MCNC: 6.6 G/DL (ref 6–8.2)
RBC # BLD AUTO: 4.29 M/UL (ref 4.2–5.4)
SODIUM SERPL-SCNC: 142 MMOL/L (ref 135–145)
WBC # BLD AUTO: 5.3 K/UL (ref 4.8–10.8)

## 2019-09-09 PROCEDURE — 82378 CARCINOEMBRYONIC ANTIGEN: CPT

## 2019-09-09 PROCEDURE — 71260 CT THORAX DX C+: CPT

## 2019-09-09 PROCEDURE — 85025 COMPLETE CBC W/AUTO DIFF WBC: CPT

## 2019-09-09 PROCEDURE — 700111 HCHG RX REV CODE 636 W/ 250 OVERRIDE (IP): Performed by: RADIOLOGY

## 2019-09-09 PROCEDURE — 36591 DRAW BLOOD OFF VENOUS DEVICE: CPT

## 2019-09-09 PROCEDURE — 700111 HCHG RX REV CODE 636 W/ 250 OVERRIDE (IP)

## 2019-09-09 PROCEDURE — 700117 HCHG RX CONTRAST REV CODE 255: Performed by: SURGERY

## 2019-09-09 PROCEDURE — 74178 CT ABD&PLV WO CNTR FLWD CNTR: CPT

## 2019-09-09 PROCEDURE — 80053 COMPREHEN METABOLIC PANEL: CPT

## 2019-09-09 PROCEDURE — A4212 NON CORING NEEDLE OR STYLET: HCPCS

## 2019-09-09 RX ORDER — DEXTROSE MONOHYDRATE 50 MG/ML
INJECTION, SOLUTION INTRAVENOUS CONTINUOUS
Status: CANCELLED | OUTPATIENT
Start: 2019-09-10

## 2019-09-09 RX ORDER — PROCHLORPERAZINE MALEATE 10 MG
10 TABLET ORAL EVERY 6 HOURS PRN
Status: CANCELLED | OUTPATIENT
Start: 2019-09-10

## 2019-09-09 RX ORDER — LIDOCAINE HYDROCHLORIDE 10 MG/ML
10 INJECTION, SOLUTION INFILTRATION; PERINEURAL ONCE
Status: CANCELLED | OUTPATIENT
Start: 2019-09-10

## 2019-09-09 RX ORDER — ONDANSETRON 2 MG/ML
4 INJECTION INTRAMUSCULAR; INTRAVENOUS
Status: CANCELLED | OUTPATIENT
Start: 2019-09-10

## 2019-09-09 RX ORDER — DEXAMETHASONE SODIUM PHOSPHATE 4 MG/ML
8 INJECTION, SOLUTION INTRA-ARTICULAR; INTRALESIONAL; INTRAMUSCULAR; INTRAVENOUS; SOFT TISSUE ONCE
Status: CANCELLED | OUTPATIENT
Start: 2019-09-10

## 2019-09-09 RX ORDER — ONDANSETRON 8 MG/1
8 TABLET, ORALLY DISINTEGRATING ORAL
Status: CANCELLED | OUTPATIENT
Start: 2019-09-10

## 2019-09-09 RX ADMIN — IOHEXOL 100 ML: 350 INJECTION, SOLUTION INTRAVENOUS at 10:53

## 2019-09-09 RX ADMIN — HEPARIN 500 UNITS: 100 SYRINGE at 09:27

## 2019-09-09 RX ADMIN — HEPARIN 500 UNITS: 100 SYRINGE at 11:00

## 2019-09-09 NOTE — PROGRESS NOTES
Subjective:   9/10/2019  3:38 PM  Primary care physician:Manan Quiñonez M.D.  Medical Oncologist: Eric Hardy M.D.    Chief Complaint:   Chief Complaint   Patient presents with   • Follow-Up     [facility] CT RECTAL CA/LIVER METS     Diagnosis:   1. Malignant neoplasm of rectum (HCC)     2. Secondary malignant neoplasm of liver (HCC)     3. Liver disease         History of presenting illness:  Karlene Walters  is a pleasant 79 y.o. year old female who presented with follow-up surveillance after undergoing a lap scopic ablation of a recurrent colon cancer in the dome of the liver.  She denies any fever or chills, nausea or vomiting.  She is wearing her infusion pump and continues with chemotherapy.  The CT scan of the chest and abdomen done on September 9 showed no sign of new disease or recurrent disease.  The ablation cavity is shrinking and there is no sign of recurrent disease.  She denies any difficulties ambulating.  Her energy level has dramatically improved.  She is tolerating her chemotherapy.  Her CEA levels are stabilized.  I have personally reviewed all the imaging from September 9 as well as her old imaging pre-ablation.      Past Medical History:   Diagnosis Date   • Arrhythmia     occasional   • Blood clotting disorder (HCC) 08/2015    bilat PE    • Blood transfusion 1957   • Breath shortness     no O2 with moderate exertion   • Cancer (HCC) 09/2012    rectal cancer,  Liver CA-2015   • CATARACT     removed b/l   • Chemotherapy-induced neutropenia (HCC) 9/28/2016   • Chickenpox    • Colon cancer (HCC)    • Dental disorder     dentures UPPERS AND LOWERS   • Elevated alkaline phosphatase level 11/15/2017   • Emphysema of lung (HCC)    • Fall    • Bulgar[place] measles    • Heart murmur     as a child   • Hemorrhagic disorder (HCC)     on Xarelto    • Hypercholesteremia    • Hypertension     no meds currently    • Ileostomy in place (HCC)    • Indigestion    • Influenza    • Lightheadedness 9/17/2015   • Mumps    •  Obstruction     ileostomy   • Other specified symptom associated with female genital organs     HYSTERECTOMY 1993   • Pneumonia 05/2017    tx'd with antibx   • Pulmonary embolism (HCC)    • 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)    • Renal insufficiency 3/14/2016   • Routine general medical examination at a health care facility 3/14/2016    3/14/16   • Seasonal allergies    • Swelling of both ankles     Lasix PRN   • Tonsillitis      Past Surgical History:   Procedure Laterality Date   • HEPATIC ABLATION LAPAROSCOPIC  11/15/2018    Procedure: HEPATIC ABLATION LAPAROSCOPIC.- SEGMENT 7/8;  Surgeon: Kit West M.D.;  Location: SURGERY [facility];  Service: General   • COLONOSCOPY WITH BIOPSY  8/23/2015    Procedure: COLONOSCOPY WITH BIOPSY;  Surgeon: Wilbur Glasgow M.D.;  Location: ENDOSCOPY [facility];  Service:    • HEPATIC ABLATION LAPAROSCOPIC  7/6/2015    Procedure: HEPATIC ABLATION LAPAROSCOPIC.;  Surgeon: Kit West M.D.;  Location: SURGERY [facility];  Service:    • CATH PLACEMENT Left 7/6/2015    Procedure: CATH PLACEMENT CEPHALIC POWERPORT;  Surgeon: Kit West M.D.;  Location: SURGERY [facility];  Service:    • FISTULA IN ANO REPAIR  1/28/2015    Performed by Kolton Montgomery M.D. at SURGERY [facility]   • PERINEAL PROCEDURE  1/28/2015    Performed by Kolton Montgomery M.D. at [facility]   • FISTULA IN ANO REPAIR  10/15/2014    Performed by Kolton Montgomery M.D. at SURGERY [facility]   • PERINEAL PROCEDURE  10/15/2014    Performed by Kolton Montgomery M.D. at [facility]   • IRRIGATION & DEBRIDEMENT GENERAL  2/19/2014    Performed by Kolton Montgomery M.D. at [facility]   • FLAP GRAFT  2/19/2014    Performed by Kolton Montgomery M.D. at [facility]   • PERINEAL PROCEDURE  12/18/2013    Performed by Kolton Montgomery M.D. at [facility]   • MYOCUTANEOUS FLAP  12/18/2013    Performed by Kolton DESAI  "MARIANA Montgomery at SURGERY [facility]   • IRRIGATION & DEBRIDEMENT GENERAL  10/23/2013    Performed by Kolton Montgomery M.D. at SURGERY [facility]   • FISTULA IN ANO REPAIR  9/4/2013    Performed by Kolton Montgomery M.D. at SURGERY [facility]   • FLAP ROTATION  9/4/2013    Performed by Kolton Montgomery M.D. at SURGERY [facility]   • RECOVERY  8/26/2013    Performed by Cath[facility]Recovery Surgery at [facility] SAME DAY [facility]   • BIOPSY GENERAL  7/17/2013    Performed by Kolton Montgomery M.D. at SURGERY [facility]   • PROCTOSCOPY  7/17/2013    Performed by Kolton Montgomery M.D. at SURGERY [facility]   • FISTULA IN ANO REPAIR  2/27/2013    Performed by Kolton Montgomery M.D. at SURGERY [facility]   • SIGMOIDOSCOPY  2/27/2013    Performed by Kolton Montgomery M.D. at SURGERY [facility]   • LAPAROSCOPY  10/8/2012    Performed by Kolton Montgomery M.D. at SURGERY [facility]   • ILEO LOOP DIVERSION  10/8/2012    Performed by Kolton Montgomery M.D. at SURGERY [facility]   • COLECTOMY  9/26/2012    Performed by Kolton Montgomery M.D. at [facility]   • COLONOSCOPY FLEX W/ENDO US  9/20/2012    Performed by Harshil Bolton M.D. at SURGERY [facility]   • OTHER  2009    left eye torn retina repair   • CATARACT EXTRACTION WITH IOL  2004    bilateral   • ABDOMINAL HYSTERECTOMY TOTAL  1983   • CYST EXCISION  1972    ovarian   • COLON RESECTION     • EYE SURGERY      cataracts   • HYSTERECTOMY LAPAROSCOPY     • PB REMV 2ND CATARACT,CORN-SCLER SECTN     • TONSILLECTOMY       Allergies   Allergen Reactions   • Oxaliplatin Anaphylaxis   • Codeine      \"gets drunk\"   • Pcn [Penicillins] Itching   • Sulfa Drugs Itching   • Tape Rash     PAPER TAPE OK     Outpatient Encounter Medications as of 9/10/2019   Medication Sig Dispense Refill   • potassium chloride (MICRO-K) 10 MEQ capsule   3   • furosemide (LASIX) 40 MG Tab Take 1 Tab by mouth every day. 90 Tab 3   • raNITidine (ZANTAC) 150 MG Tab Take 1 Tab by mouth " 2 Times a Day. 60 Tab 3   • rivaroxaban (XARELTO) 10 MG Tab tablet Take 1 Tab by mouth every day. 90 Tab 1   • lidocaine-prilocaine (EMLA) 2.5-2.5 % Cream APPLY A THICK FILM OVER THE PORT ACCESS SITE PRIOR TO ACCESS 30 g 3   • ondansetron (ZOFRAN) 4 MG Tab tablet Take 1 Tab by mouth as needed. 30 Tab 3   • Tiotropium Bromide-Olodaterol (STIOLTO RESPIMAT) 2.5-2.5 MCG/ACT Aero Soln Take 2 Puffs by mouth every day. 1 Inhaler 11   • albuterol (PROAIR HFA) 108 (90 Base) MCG/ACT Aero Soln inhalation aerosol Inhale 2 Puffs by mouth every four hours as needed for Shortness of Breath (wheezing). 1 Inhaler 11   • metronidazole (METROCREAM) 0.75 % cream Apply 1 Each to affected area(s) 2 times a day.     • loperamide (IMODIUM) 2 MG Cap Take 2 mg by mouth 4 times a day as needed for Diarrhea.     • cyanocobalamin (VITAMIN B-12) 500 MCG Tab Take 1,000 mcg by mouth every day.     • Multiple Vitamins-Minerals (CENTRUM SILVER PO) Take 1 Tab by mouth every day.     • Cholecalciferol (VITAMIN D3) 400 UNITS CAPS Take 1 Cap by mouth every day.     • loratadine (CLARITIN) 10 MG TABS Take 10 mg by mouth every day. Indications: Hayfever     • [] heparin pf injection 300-500 Units        No facility-administered encounter medications on file as of 9/10/2019.      Social History     Socioeconomic History   • Marital status:      Spouse name: Not on file   • Number of children: Not on file   • Years of education: Not on file   • Highest education level: Not on file   Occupational History   • Not on file   Social Needs   • Financial resource strain: Not on file   • Food insecurity:     Worry: Not on file     Inability: Not on file   • Transportation needs:     Medical: Not on file     Non-medical: Not on file   Tobacco Use   • Smoking status: Never Smoker   • Smokeless tobacco: Never Used   Substance and Sexual Activity   • Alcohol use: No     Comment: rare   • Drug use: No   • Sexual activity: Never     Partners: Male     Birth  control/protection: Post-Menopausal   Lifestyle   • Physical activity:     Days per week: Not on file     Minutes per session: Not on file   • Stress: Not on file   Relationships   • Social connections:     Talks on phone: Not on file     Gets together: Not on file     Attends Latter day service: Not on file     Active member of club or organization: Not on file     Attends meetings of clubs or organizations: Not on file     Relationship status: Not on file   • Intimate partner violence:     Fear of current or ex partner: Not on file     Emotionally abused: Not on file     Physically abused: Not on file     Forced sexual activity: Not on file   Other Topics Concern   • Primary/coprimary nurse & associates Not Asked   • Family contact information Not Asked   • OK to release patient information to the following Not Asked   • Patient preferred routine/Privacy concerns Not Asked   • Patient likes and dislikes Not Asked   • Participating In Research Study Not Asked   • Miscellaneous Not Asked   Social History Narrative   • Not on file      Social History     Tobacco Use   Smoking Status Never Smoker   Smokeless Tobacco Never Used     Social History     Substance and Sexual Activity   Alcohol Use No    Comment: rare     Social History     Substance and Sexual Activity   Drug Use No        Family History   Problem Relation Age of Onset   • Heart Disease Mother    • Heart Failure Mother    • Hypertension Mother    • Hyperlipidemia Mother    • Cancer Mother    • Cancer Maternal Aunt 60        breast ca   • Lung Disease Brother         COPD/Emphysema/Smoker   • Cancer Brother         prostate cancer   • Alcohol/Drug Brother         Alcohol   • Kidney Disease Father         renal failure       Review of Systems   Respiratory: Positive for shortness of breath.    All other systems reviewed and are negative.       Objective:   /64 (BP Location: Left arm, Patient Position: Sitting, BP Cuff Size: Adult)   Pulse (!) 106    "Temp 36.4 °C (97.5 °F)   Ht 1.626 m (5' 4\")   Wt 78.2 kg (172 lb 6.4 oz)   LMP  (LMP Unknown)   SpO2 95%   BMI 29.59 kg/m²     Physical Exam   Constitutional: She is oriented to person, place, and time. She appears well-developed and well-nourished.   HENT:   Head: Normocephalic and atraumatic.   Eyes: Pupils are equal, round, and reactive to light. Conjunctivae are normal.   Neck: Normal range of motion. Neck supple.   Cardiovascular: Normal rate and regular rhythm.   Pulmonary/Chest: Effort normal and breath sounds normal.   Abdominal: Soft. Bowel sounds are normal.   Ostomy functioning well she is wearing her infusion pump   Musculoskeletal: Normal range of motion.   Neurological: She is alert and oriented to person, place, and time.   Skin: Skin is warm and dry.   Psychiatric: She has a normal mood and affect. Her behavior is normal. Judgment and thought content normal.   Nursing note and vitals reviewed.      Labs:  Results for KANG HALL (MRN [id number]) as of 9/9/2019 16:11   Ref. Range 9/9/2019 09:27   WBC Latest Ref Range: 4.8 - 10.8 K/uL 5.3   RBC Latest Ref Range: 4.20 - 5.40 M/uL 4.29   Hemoglobin Latest Ref Range: 12.0 - 16.0 g/dL 12.2   Hematocrit Latest Ref Range: 37.0 - 47.0 % 38.9   MCV Latest Ref Range: 81.4 - 97.8 fL 90.7   MCH Latest Ref Range: 27.0 - 33.0 pg 28.4   MCHC Latest Ref Range: 33.6 - 35.0 g/dL 31.4 (L)   RDW Latest Ref Range: 35.9 - 50.0 fL 59.1 (H)   Platelet Count Latest Ref Range: 164 - 446 K/uL 224   MPV Latest Ref Range: 9.0 - 12.9 fL 10.5   Neutrophils-Polys Latest Ref Range: 44.00 - 72.00 % 60.40   Neutrophils (Absolute) Latest Ref Range: 2.00 - 7.15 K/uL 3.21   Lymphocytes Latest Ref Range: 22.00 - 41.00 % 19.50 (L)   Lymphs (Absolute) Latest Ref Range: 1.00 - 4.80 K/uL 1.04   Monocytes Latest Ref Range: 0.00 - 13.40 % 14.30 (H)   Monos (Absolute) Latest Ref Range: 0.00 - 0.85 K/uL 0.76   Eosinophils Latest Ref Range: 0.00 - 6.90 % 4.30   Eos (Absolute) Latest Ref " Range: 0.00 - 0.51 K/uL 0.23   Basophils Latest Ref Range: 0.00 - 1.80 % 0.90   Baso (Absolute) Latest Ref Range: 0.00 - 0.12 K/uL 0.05   Immature Granulocytes Latest Ref Range: 0.00 - 0.90 % 0.60   Immature Granulocytes (abs) Latest Ref Range: 0.00 - 0.11 K/uL 0.03   Nucleated RBC Latest Units: /100 WBC 0.00   NRBC (Absolute) Latest Units: K/uL 0.00   Sodium Latest Ref Range: 135 - 145 mmol/L 142   Potassium Latest Ref Range: 3.6 - 5.5 mmol/L 3.6   Chloride Latest Ref Range: 96 - 112 mmol/L 109   Co2 Latest Ref Range: 20 - 33 mmol/L 21   Anion Gap Latest Ref Range: 0.0 - 11.9  12.0 (H)   Glucose Latest Ref Range: 65 - 99 mg/dL 99   Bun Latest Ref Range: 8 - 22 mg/dL 16   Creatinine Latest Ref Range: 0.50 - 1.40 mg/dL 0.98   GFR If  Latest Ref Range: >60 mL/min/1.73 m 2 >60   GFR If Non  Latest Ref Range: >60 mL/min/1.73 m 2 55 (A)   Calcium Latest Ref Range: 8.5 - 10.5 mg/dL 9.3   AST(SGOT) Latest Ref Range: 12 - 45 U/L 28   ALT(SGPT) Latest Ref Range: 2 - 50 U/L 21   Alkaline Phosphatase Latest Ref Range: 30 - 99 U/L 125 (H)   Total Bilirubin Latest Ref Range: 0.1 - 1.5 mg/dL 0.7   Albumin Latest Ref Range: 3.2 - 4.9 g/dL 3.8   Total Protein Latest Ref Range: 6.0 - 8.2 g/dL 6.6   Globulin Latest Ref Range: 1.9 - 3.5 g/dL 2.8   A-G Ratio Latest Units: g/dL 1.4   Carcinoembryonic Antigen Latest Ref Range: 0.0 - 3.0 ng/mL 1.1       Imaging:  Per my read,  9/9/19 CT ABD/PEL    Impression       1.  Metastatic lesion in the hepatic dome has decreased in size. Tiny subcapsular left hepatic lesion is no longer seen. No new liver lesions.  2.  No new adenopathy or metastatic disease in the abdomen or pelvis.  3.  Postsurgical changes in the abdomen and pelvis with a right lower quadrant ostomy.  4.  Pelvic floor dysfunction with moderate cystocele and severe enterocele/peritoneocele.          CHEST  Impression       No metastatic disease in the chest.          Pathology:  NA     Procedures:  11/15/18     Overlapping ablations to segment 7 of the liver x3, each being 4   minute at 140 yanes giving us a 4x5 cm ablation cavity overlapping the region   of the lateral posterior aspect and inferior aspect of the previous ablation   Cavity.       Diagnosis:     1. Malignant neoplasm of rectum (HCC)     2. Secondary malignant neoplasm of liver (HCC)     3. Liver disease             Medical Decision Making:  Today's Assessment / Status / Plan:     In light of the present findings, the patient CT scan shows shrinking of the ablation cavity and no sign of new disease.  Her chest CT is also clear.  My recommendation is to repeat an abdominal CT of the liver in 4 months and only look to repeat lung scans every 6 months to every year.  I will arrange for the CT in 4 months of her abdomen.  She will see me after it has been completed.  There needs to be discussion about stopping her chemotherapy since she has been disease-free for some time.    She agreed and verbalized her agreement and understanding with the current plan. I answered all questions and concerns she has at this time and advised her to call at any time in the interim with questions or concerns in regards to her care.    Thank you for allowing me to participate in her care, I will continue to follow closely.       Please note that this dictation was created using voice recognition software. I have made every reasonable attempt to correct obvious errors, but I expect that there are errors of grammar and possibly content that I did not discover before finalizing the note.     Thank you for this consultation and allowing me to participate in your patient's care. If I can be of further service please contact my office.

## 2019-09-09 NOTE — PROGRESS NOTES
Pt to imaging department for scheduled CT.  Pt arrived with L chest port accessed.  After CT completed, I flushed with port with 20 cc saline and then heparin flush (see MAR).  Port left accessed because pt states she has chemo tomorrow.

## 2019-09-09 NOTE — PROGRESS NOTES
Pt to infusion services ambulatory per self.  Pt here for scheduled port access prior to CT scan and labs.  Plan of care reviewed.  Pt verbalizes understanding.  Pt with L chest port.  Port site cleansed and port accessed in sterile fashion using #20 G, 1 inch non-coring hernandez PowerLoc needle. Port flushes easily; +blood return verified.  Labs drawn per MD orders for treatment tomorrow.  Port flushed with normal saline and heparin per policy.  Port remains accessed for CT today ad treatment tomorrow per pt request.  Pt left infusion services in no apparent distress after completion of treatment, ambulatory.  Next appointment scheduled and confirmed.

## 2019-09-10 ENCOUNTER — OFFICE VISIT (OUTPATIENT)
Dept: SURGERY | Facility: MEDICAL CENTER | Age: 79
End: 2019-09-10
Payer: MEDICARE

## 2019-09-10 ENCOUNTER — OUTPATIENT INFUSION SERVICES (OUTPATIENT)
Dept: ONCOLOGY | Facility: MEDICAL CENTER | Age: 79
End: 2019-09-10
Attending: INTERNAL MEDICINE
Payer: MEDICARE

## 2019-09-10 VITALS
HEIGHT: 65 IN | OXYGEN SATURATION: 99 % | HEART RATE: 96 BPM | TEMPERATURE: 97.4 F | DIASTOLIC BLOOD PRESSURE: 86 MMHG | RESPIRATION RATE: 18 BRPM | BODY MASS INDEX: 28.17 KG/M2 | SYSTOLIC BLOOD PRESSURE: 128 MMHG | WEIGHT: 169.09 LBS

## 2019-09-10 VITALS
BODY MASS INDEX: 29.43 KG/M2 | WEIGHT: 172.4 LBS | DIASTOLIC BLOOD PRESSURE: 64 MMHG | TEMPERATURE: 97.5 F | HEIGHT: 64 IN | HEART RATE: 106 BPM | OXYGEN SATURATION: 95 % | SYSTOLIC BLOOD PRESSURE: 138 MMHG

## 2019-09-10 DIAGNOSIS — C20 MALIGNANT NEOPLASM OF RECTUM (HCC): ICD-10-CM

## 2019-09-10 DIAGNOSIS — R94.4 DECREASED GFR: ICD-10-CM

## 2019-09-10 DIAGNOSIS — C20 RECTAL CANCER (HCC): ICD-10-CM

## 2019-09-10 DIAGNOSIS — K76.9 LIVER DISEASE: ICD-10-CM

## 2019-09-10 DIAGNOSIS — C78.7 SECONDARY MALIGNANT NEOPLASM OF LIVER (HCC): ICD-10-CM

## 2019-09-10 PROCEDURE — 700111 HCHG RX REV CODE 636 W/ 250 OVERRIDE (IP): Performed by: NURSE PRACTITIONER

## 2019-09-10 PROCEDURE — 96375 TX/PRO/DX INJ NEW DRUG ADDON: CPT

## 2019-09-10 PROCEDURE — 99215 OFFICE O/P EST HI 40 MIN: CPT | Performed by: SURGERY

## 2019-09-10 PROCEDURE — 700111 HCHG RX REV CODE 636 W/ 250 OVERRIDE (IP)

## 2019-09-10 PROCEDURE — 96374 THER/PROPH/DIAG INJ IV PUSH: CPT | Mod: XU

## 2019-09-10 PROCEDURE — G0498 CHEMO EXTEND IV INFUS W/PUMP: HCPCS

## 2019-09-10 RX ORDER — DEXAMETHASONE SODIUM PHOSPHATE 4 MG/ML
8 INJECTION, SOLUTION INTRA-ARTICULAR; INTRALESIONAL; INTRAMUSCULAR; INTRAVENOUS; SOFT TISSUE ONCE
Status: COMPLETED | OUTPATIENT
Start: 2019-09-10 | End: 2019-09-10

## 2019-09-10 RX ADMIN — FLUOROURACIL 3590 MG: 50 INJECTION, SOLUTION INTRAVENOUS at 09:19

## 2019-09-10 RX ADMIN — DEXAMETHASONE SODIUM PHOSPHATE 8 MG: 4 INJECTION, SOLUTION INTRA-ARTICULAR; INTRALESIONAL; INTRAMUSCULAR; INTRAVENOUS; SOFT TISSUE at 09:01

## 2019-09-10 ASSESSMENT — ENCOUNTER SYMPTOMS: SHORTNESS OF BREATH: 1

## 2019-09-10 NOTE — PROGRESS NOTES
Pt arrived to IS, ambulatory, for 5FU. Pt voices no complaints. Port (accessed 9/9) flushed per policy, positive blood return noted. Labs reviewed from 9/9, pt within parameters to treat today. Dexamethasone administered with no s/sx of adverse reaction. 5FU CADD pump connected with no complications. Pt left IS with no s/sx of distress. Follow up appointment confirmed.

## 2019-09-10 NOTE — PROGRESS NOTES
"Pharmacy Chemotherapy Verification    Patient Name: Karlene Walters  Dx: Colon CA        Protocol: 5-FU + Leucovorin      IV over 2 hours on Day 1, followed by    IVP on Day 1, followed by         -- 10/31/17: delete Leucovorin and 5-FU push d/t neutropenia per Dr. Hardy   Fluorouracil   IV continuous infusion daily on Days 1 and 2 ( IV over 46-48 hours)        -- 20% reduction (1920 mg/m²) to be continued starting C28 per C Alsop APRN   Q14 day cycles for 12 cycles (adjuvant) or until disease progression or unacceptable toxicity  NCCN Guidelines for Colon Cancer. V.2.2015.  Jay T, et al. Eur J Cancer.1999;35(9):1343-7.      Allergies:  Codeine; Oxaliplatin; Pcn; Sulfa drugs; and Tape     /86   Pulse 96   Temp 36.3 °C (97.4 °F) (Temporal)   Resp 18   Ht 1.645 m (5' 4.75\") Comment: 1 inch off shoes.  Wt 76.7 kg (169 lb 1.5 oz)   LMP  (LMP Unknown)   SpO2 99%   BMI 28.36 kg/m²  Body surface area is 1.87 meters squared.    Labs 9/9/19  ANC~ 3210 Plt = 224k   Hgb = 12.2     SCr = 0.98 mg/dL CrCl ~ 56 mL/min   LFT's = 28/21/125 TBili = 0.7     Drug Order   (Drug name, dose, route, IV Fluid & volume, frequency, number of doses) Cycle 91 (C83 in [place])   Previous treatment: 8/27/19   Medication = Fluorouracil (5-FU)   Base Dose = 1920 mg/m²  Calc Dose:Base Dose x 1.87 m² = 3590.4 mg  Final Dose = 3590 mg   Route = CIVI  Drug Conc = 50 mg/mL  Fluid & Volume = 71.8 mL (+3 mL overfill = 74.8 mL)  Admin Duration = CIVI over 46 hrs @ 1.6 mL/hr   Via CADD pump for home infusion        <10% difference, OK to treat with final dose     By my signature below, I confirm this process was performed independently with the BSA and all final chemotherapy dosing calculations congruent. I have reviewed the above chemotherapy order and that my calculation of the final dose and BSA (when applicable) corroborate those calculations of the  pharmacist.     Demetrius Maldonado, PharmD    "

## 2019-09-10 NOTE — PROGRESS NOTES
Chemotherapy Verification - SECONDARY RN       Height = 164.5 cm  Weight = 76.7 kg  BSA = 1.87 m2       Medication: Home Infusion 5FU   Dose: 1920 mg/m2 dr  Calculated Dose: 3594.4 mg                             (In mg/m2, AUC, mg/kg)       I confirm that this process was performed independently.

## 2019-09-10 NOTE — PROGRESS NOTES
Chemotherapy Verification - PRIMARY RN      Height = 164.5 cm  Weight = 76.7 kg  BSA = 1.87 m2       Medication: Fluorouracil  Dose: 1,920 mg/m2  Calculated Dose: 3,590.4 mg                             (In mg/m2, AUC, mg/kg)       I confirm this process was performed independently with the BSA and all final chemotherapy dosing calculations congruent.  Any discrepancies of 10% or greater have been addressed with the chemotherapy pharmacist. The resolution of the discrepancy has been documented in the EPIC progress notes.

## 2019-09-11 ENCOUNTER — OFFICE VISIT (OUTPATIENT)
Dept: CARDIOLOGY | Facility: MEDICAL CENTER | Age: 79
End: 2019-09-11
Payer: MEDICARE

## 2019-09-11 VITALS
HEART RATE: 77 BPM | OXYGEN SATURATION: 100 % | SYSTOLIC BLOOD PRESSURE: 122 MMHG | HEIGHT: 65 IN | WEIGHT: 170.8 LBS | BODY MASS INDEX: 28.46 KG/M2 | DIASTOLIC BLOOD PRESSURE: 62 MMHG

## 2019-09-11 DIAGNOSIS — Z86.711 HISTORY OF PULMONARY EMBOLISM: Chronic | ICD-10-CM

## 2019-09-11 DIAGNOSIS — R94.31 ABNORMAL EKG: ICD-10-CM

## 2019-09-11 DIAGNOSIS — R94.4 DECREASED GFR: ICD-10-CM

## 2019-09-11 DIAGNOSIS — E86.0 DEHYDRATION: ICD-10-CM

## 2019-09-11 DIAGNOSIS — D64.9 ANEMIA, UNSPECIFIED TYPE: ICD-10-CM

## 2019-09-11 DIAGNOSIS — E78.5 HYPERLIPIDEMIA, UNSPECIFIED HYPERLIPIDEMIA TYPE: ICD-10-CM

## 2019-09-11 DIAGNOSIS — J43.9 PULMONARY EMPHYSEMA, UNSPECIFIED EMPHYSEMA TYPE (HCC): Chronic | ICD-10-CM

## 2019-09-11 DIAGNOSIS — D70.1 CHEMOTHERAPY-INDUCED NEUTROPENIA (HCC): ICD-10-CM

## 2019-09-11 DIAGNOSIS — I10 ESSENTIAL HYPERTENSION: ICD-10-CM

## 2019-09-11 DIAGNOSIS — N28.9 RENAL INSUFFICIENCY: ICD-10-CM

## 2019-09-11 DIAGNOSIS — T45.1X5A CHEMOTHERAPY-INDUCED NEUTROPENIA (HCC): ICD-10-CM

## 2019-09-11 DIAGNOSIS — R06.02 SHORTNESS OF BREATH: ICD-10-CM

## 2019-09-11 PROCEDURE — 99214 OFFICE O/P EST MOD 30 MIN: CPT | Performed by: INTERNAL MEDICINE

## 2019-09-12 ENCOUNTER — OUTPATIENT INFUSION SERVICES (OUTPATIENT)
Dept: ONCOLOGY | Facility: MEDICAL CENTER | Age: 79
End: 2019-09-12
Attending: INTERNAL MEDICINE
Payer: MEDICARE

## 2019-09-12 VITALS
DIASTOLIC BLOOD PRESSURE: 74 MMHG | SYSTOLIC BLOOD PRESSURE: 128 MMHG | HEIGHT: 65 IN | OXYGEN SATURATION: 100 % | RESPIRATION RATE: 18 BRPM | HEART RATE: 95 BPM | BODY MASS INDEX: 28.17 KG/M2 | TEMPERATURE: 98 F | WEIGHT: 169.09 LBS

## 2019-09-12 DIAGNOSIS — C20 MALIGNANT NEOPLASM OF RECTUM (HCC): ICD-10-CM

## 2019-09-12 DIAGNOSIS — R94.4 DECREASED GFR: ICD-10-CM

## 2019-09-12 PROCEDURE — 96523 IRRIG DRUG DELIVERY DEVICE: CPT

## 2019-09-12 PROCEDURE — 700111 HCHG RX REV CODE 636 W/ 250 OVERRIDE (IP)

## 2019-09-12 RX ADMIN — HEPARIN 500 UNITS: 100 SYRINGE at 08:20

## 2019-09-12 ASSESSMENT — ENCOUNTER SYMPTOMS
DIZZINESS: 0
NEUROLOGICAL NEGATIVE: 1
CARDIOVASCULAR NEGATIVE: 1
MUSCULOSKELETAL NEGATIVE: 1
SPUTUM PRODUCTION: 0
PALPITATIONS: 0
GASTROINTESTINAL NEGATIVE: 1
CLAUDICATION: 0
LOSS OF CONSCIOUSNESS: 0
ORTHOPNEA: 0
SHORTNESS OF BREATH: 0
RESPIRATORY NEGATIVE: 1
CHILLS: 0
CONSTITUTIONAL NEGATIVE: 1
WEAKNESS: 0
WHEEZING: 0
PND: 0
SORE THROAT: 0
HEMOPTYSIS: 0
BRUISES/BLEEDS EASILY: 0
FEVER: 0
COUGH: 0
EYES NEGATIVE: 1
STRIDOR: 0

## 2019-09-12 NOTE — PROGRESS NOTES
Pt to infusion services ambulatory per self.  Pt here for scheduled pump disconnect from CIVI of 5FU via CADD pump.  Plan of care reviewed.  Pt verbalizes understanding.  Pt reports no issues with pump.  Pump settings observed and verified, reservoir volume = 0 mL and given = 73.3 mL.  Pump tubing clamped, pump turned off, and pump tubing disconnected from patient.  Port flushed with normal saline.  Port flushes easily, +brisk blood return verified.  Port flushed with normal saline and heparin per policy.  Port de-accessed; needle intact.  Pressure dressing applied.  Pt left infusion services in no apparent distress after completion of treatment, ambulatory.  Pt to return in 2 weeks; next appointment scheduled.

## 2019-09-12 NOTE — PROGRESS NOTES
Chief Complaint   Patient presents with   • Hypertension     F/V 3month        Subjective:   Karlene Walters is a 79 y.o. female who presents today as a follow-up for her PE emphysema shortness of breath lower extremity edema and abnormal ECG.  Since she was last seen she underwent a repeat PET scan which was clean.  She is no longer having as much shortness of breath that she was having in the past.  Her blood pressure is currently controlled.  She is doing well on her current medications.  She is currently going through infusion of her 5-FU which she is decided stay on for an indefinite period of time    Past Medical History:   Diagnosis Date   • Arrhythmia     occasional   • Blood clotting disorder (HCC) 08/2015    bilat PE    • Blood transfusion 1957   • Breath shortness     no O2 with moderate exertion   • Cancer (HCC) 09/2012    rectal cancer,  Liver CA-2015   • CATARACT     removed b/l   • Chemotherapy-induced neutropenia (HCC) 9/28/2016   • Chickenpox    • Colon cancer (HCC)    • Dental disorder     dentures UPPERS AND LOWERS   • Elevated alkaline phosphatase level 11/15/2017   • Emphysema of lung (HCC)    • Fall    • English measles    • Heart murmur     as a child   • Hemorrhagic disorder (HCC)     on Xarelto    • Hypercholesteremia    • Hypertension     no meds currently    • Ileostomy in place (HCC)    • Indigestion    • Influenza    • Lightheadedness 9/17/2015   • Mumps    • Obstruction     ileostomy   • Other specified symptom associated with female genital organs     HYSTERECTOMY 1993   • Pneumonia 05/2017    tx'd with antibx   • Pulmonary embolism (HCC)    • 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)    • Renal insufficiency 3/14/2016   • Routine general medical examination at a health care facility 3/14/2016    3/14/16   • Seasonal allergies    • Swelling of both ankles     Lasix PRN   • Tonsillitis      Past Surgical History:   Procedure Laterality Date   • HEPATIC ABLATION LAPAROSCOPIC   11/15/2018    Procedure: HEPATIC ABLATION LAPAROSCOPIC.- SEGMENT 7/8;  Surgeon: Kit West M.D.;  Location: SURGERY [facility];  Service: General   • COLONOSCOPY WITH BIOPSY  8/23/2015    Procedure: COLONOSCOPY WITH BIOPSY;  Surgeon: Wilbur Glasgow M.D.;  Location: ENDOSCOPY [facility];  Service:    • HEPATIC ABLATION LAPAROSCOPIC  7/6/2015    Procedure: HEPATIC ABLATION LAPAROSCOPIC.;  Surgeon: Kit West M.D.;  Location: SURGERY [facility];  Service:    • CATH PLACEMENT Left 7/6/2015    Procedure: CATH PLACEMENT CEPHALIC POWERPORT;  Surgeon: Kit West M.D.;  Location: SURGERY [facility];  Service:    • FISTULA IN ANO REPAIR  1/28/2015    Performed by Kolton Montgomery M.D. at [facility]   • PERINEAL PROCEDURE  1/28/2015    Performed by Kolton Montgomery M.D. at SURGERY [facility]   • FISTULA IN ANO REPAIR  10/15/2014    Performed by Kolton Montgomery M.D. at [facility]   • PERINEAL PROCEDURE  10/15/2014    Performed by Kolton Montgomery M.D. at SURGERY [facility]   • IRRIGATION & DEBRIDEMENT GENERAL  2/19/2014    Performed by Kolton Montgomery M.D. at [facility]   • FLAP GRAFT  2/19/2014    Performed by Kolton Montgomery M.D. at [facility]   • PERINEAL PROCEDURE  12/18/2013    Performed by Kolton Montgomery M.D. at [facility]   • MYOCUTANEOUS FLAP  12/18/2013    Performed by Kolton Montgomery M.D. at [facility]   • IRRIGATION & DEBRIDEMENT GENERAL  10/23/2013    Performed by Kolton Montgomery M.D. at [facility]   • FISTULA IN ANO REPAIR  9/4/2013    Performed by Kolton Montgomery M.D. at [facility]   • FLAP ROTATION  9/4/2013    Performed by Kolton Montgomery M.D. at [facility]   • RECOVERY  8/26/2013    Performed by Cath-Recovery Surgery at [facility]ERY [facility]   • BIOPSY GENERAL  7/17/2013    Performed by Kolton Montgomery M.D. at SUR[facility]ERY [facility]   •  PROCTOSCOPY  7/17/2013    Performed by Kolton Montgomery M.D. at SURGERY [facility]   • FISTULA IN ANO REPAIR  2/27/2013    Performed by Kolton Montgomery M.D. at SURGERY [facility]   • SIGMOIDOSCOPY  2/27/2013    Performed by Kolton Montgomery M.D. at SURGERY [facility]   • LAPAROSCOPY  10/8/2012    Performed by Kolton Montgomery M.D. at SURGERY [facility]   • ILEO LOOP DIVERSION  10/8/2012    Performed by Kolton Montgomery M.D. at SURGERY [facility]   • COLECTOMY  9/26/2012    Performed by Kolton Montgomery M.D. at SURGERY [facility]   • COLONOSCOPY FLEX W/ENDO US  9/20/2012    Performed by Harshil Bolton M.D. at SURGERY [facility]   • OTHER  2009    left eye torn retina repair   • CATARACT EXTRACTION WITH IOL  2004    bilateral   • ABDOMINAL HYSTERECTOMY TOTAL  1983   • CYST EXCISION  1972    ovarian   • COLON RESECTION     • EYE SURGERY      cataracts   • HYSTERECTOMY LAPAROSCOPY     • PB REMV 2ND CATARACT,CORN-SCLER SECTN     • TONSILLECTOMY       Family History   Problem Relation Age of Onset   • Heart Disease Mother    • Heart Failure Mother    • Hypertension Mother    • Hyperlipidemia Mother    • Cancer Mother    • Cancer Maternal Aunt 60        breast ca   • Lung Disease Brother         COPD/Emphysema/Smoker   • Cancer Brother         prostate cancer   • Alcohol/Drug Brother         Alcohol   • Kidney Disease Father         renal failure     Social History     Socioeconomic History   • Marital status:      Spouse name: Not on file   • Number of children: Not on file   • Years of education: Not on file   • Highest education level: Not on file   Occupational History   • Not on file   Social Needs   • Financial resource strain: Not on file   • Food insecurity:     Worry: Not on file     Inability: Not on file   • Transportation needs:     Medical: Not on file     Non-medical: Not on file   Tobacco Use   • Smoking status: Never Smoker   • Smokeless tobacco: Never Used   Substance and Sexual  "Activity   • Alcohol use: No     Comment: rare   • Drug use: No   • Sexual activity: Never     Partners: Male     Birth control/protection: Post-Menopausal   Lifestyle   • Physical activity:     Days per week: Not on file     Minutes per session: Not on file   • Stress: Not on file   Relationships   • Social connections:     Talks on phone: Not on file     Gets together: Not on file     Attends Orthodox service: Not on file     Active member of club or organization: Not on file     Attends meetings of clubs or organizations: Not on file     Relationship status: Not on file   • Intimate partner violence:     Fear of current or ex partner: Not on file     Emotionally abused: Not on file     Physically abused: Not on file     Forced sexual activity: Not on file   Other Topics Concern   • Primary/coprimary nurse & associates Not Asked   • Family contact information Not Asked   • OK to release patient information to the following Not Asked   • Patient preferred routine/Privacy concerns Not Asked   • Patient likes and dislikes Not Asked   • Participating In Research Study Not Asked   • Miscellaneous Not Asked   Social History Narrative   • Not on file     Allergies   Allergen Reactions   • Oxaliplatin Anaphylaxis   • Codeine      \"gets drunk\"   • Pcn [Penicillins] Itching   • Sulfa Drugs Itching   • Tape Rash     PAPER TAPE OK     Outpatient Encounter Medications as of 9/11/2019   Medication Sig Dispense Refill   • potassium chloride (MICRO-K) 10 MEQ capsule   3   • furosemide (LASIX) 40 MG Tab Take 1 Tab by mouth every day. 90 Tab 3   • raNITidine (ZANTAC) 150 MG Tab Take 1 Tab by mouth 2 Times a Day. 60 Tab 3   • rivaroxaban (XARELTO) 10 MG Tab tablet Take 1 Tab by mouth every day. 90 Tab 1   • lidocaine-prilocaine (EMLA) 2.5-2.5 % Cream APPLY A THICK FILM OVER THE PORT ACCESS SITE PRIOR TO ACCESS 30 g 3   • ondansetron (ZOFRAN) 4 MG Tab tablet Take 1 Tab by mouth as needed. 30 Tab 3   • Tiotropium Bromide-Olodaterol " "(STIOLTO RESPIMAT) 2.5-2.5 MCG/ACT Aero Soln Take 2 Puffs by mouth every day. 1 Inhaler 11   • albuterol (PROAIR HFA) 108 (90 Base) MCG/ACT Aero Soln inhalation aerosol Inhale 2 Puffs by mouth every four hours as needed for Shortness of Breath (wheezing). 1 Inhaler 11   • metronidazole (METROCREAM) 0.75 % cream Apply 1 Each to affected area(s) 2 times a day.     • loperamide (IMODIUM) 2 MG Cap Take 2 mg by mouth 4 times a day as needed for Diarrhea.     • cyanocobalamin (VITAMIN B-12) 500 MCG Tab Take 1,000 mcg by mouth every day.     • Multiple Vitamins-Minerals (CENTRUM SILVER PO) Take 1 Tab by mouth every day.     • Cholecalciferol (VITAMIN D3) 400 UNITS CAPS Take 1 Cap by mouth every day.     • loratadine (CLARITIN) 10 MG TABS Take 10 mg by mouth every day. Indications: Hayfever       No facility-administered encounter medications on file as of 9/11/2019.      Review of Systems   Constitutional: Negative.  Negative for chills, fever and malaise/fatigue.   HENT: Negative.  Negative for sore throat.    Eyes: Negative.    Respiratory: Negative.  Negative for cough, hemoptysis, sputum production, shortness of breath, wheezing and stridor.    Cardiovascular: Negative.  Negative for chest pain, palpitations, orthopnea, claudication, leg swelling and PND.   Gastrointestinal: Negative.    Genitourinary: Negative.    Musculoskeletal: Negative.    Skin: Negative.    Neurological: Negative.  Negative for dizziness, loss of consciousness and weakness.   Endo/Heme/Allergies: Negative.  Does not bruise/bleed easily.   All other systems reviewed and are negative.       Objective:   /62 (BP Location: Left arm, Patient Position: Sitting, BP Cuff Size: Adult)   Pulse 77   Ht 1.645 m (5' 4.75\")   Wt 77.5 kg (170 lb 12.8 oz)   LMP  (LMP Unknown)   SpO2 100%   BMI 28.64 kg/m²     Physical Exam   Constitutional: She appears well-developed and well-nourished. No distress.   HENT:   Head: Normocephalic and atraumatic. "   Right Ear: External ear normal.   Left Ear: External ear normal.   Nose: Nose normal.   Mouth/Throat: No oropharyngeal exudate.   Eyes: Pupils are equal, round, and reactive to light. Conjunctivae and EOM are normal. Right eye exhibits no discharge. Left eye exhibits no discharge. No scleral icterus.   Neck: Neck supple. No JVD present.   Cardiovascular: Normal rate, regular rhythm and intact distal pulses. Exam reveals no gallop and no friction rub.   No murmur heard.  Pulmonary/Chest: Effort normal. No stridor. No respiratory distress. She has no wheezes. She has no rales. She exhibits no tenderness.   Abdominal: Soft. She exhibits no distension. There is no guarding.   Musculoskeletal: Normal range of motion. She exhibits no edema, tenderness or deformity.   Neurological: She is alert. She has normal reflexes. She displays normal reflexes. No cranial nerve deficit. She exhibits normal muscle tone. Coordination normal.   Skin: Skin is warm and dry. No rash noted. She is not diaphoretic. No erythema. No pallor.   Psychiatric: She has a normal mood and affect. Her behavior is normal. Judgment and thought content normal.   Nursing note and vitals reviewed.      Assessment:     1. Abnormal EKG     2. Anemia, unspecified type     3. Chemotherapy-induced neutropenia (HCC)     4. Decreased GFR     5. Dehydration     6. Hyperlipidemia, unspecified hyperlipidemia type     7. Pulmonary emphysema, unspecified emphysema type (HCC)     8. Renal insufficiency     9. Shortness of breath     10. Essential hypertension     11. History of pulmonary embolism         Medical Decision Making:  Today's Assessment / Status / Plan:     79-year-old female with shortness of breath and a history of PEs doing well on her current medical therapy.  At this point we will keep her on the same treatment.  We will see her back in 6 months.  She will call the clinic if she needs any refills.    1. Orthostatic tachycardia  - resolved     2.  Shortness of breath with exertion and PEs, normal echo  - cont Rivaroxiban 10     3. LE Edema    - lasix 40 daily    - KCL 10 mEq daily

## 2019-09-24 ENCOUNTER — OUTPATIENT INFUSION SERVICES (OUTPATIENT)
Dept: ONCOLOGY | Facility: MEDICAL CENTER | Age: 79
End: 2019-09-24
Attending: INTERNAL MEDICINE
Payer: MEDICARE

## 2019-09-24 ENCOUNTER — OFFICE VISIT (OUTPATIENT)
Dept: HEMATOLOGY ONCOLOGY | Facility: MEDICAL CENTER | Age: 79
End: 2019-09-24
Payer: MEDICARE

## 2019-09-24 VITALS
DIASTOLIC BLOOD PRESSURE: 77 MMHG | RESPIRATION RATE: 18 BRPM | HEIGHT: 65 IN | WEIGHT: 169.09 LBS | HEART RATE: 94 BPM | SYSTOLIC BLOOD PRESSURE: 129 MMHG | TEMPERATURE: 97 F | BODY MASS INDEX: 28.17 KG/M2 | OXYGEN SATURATION: 99 %

## 2019-09-24 VITALS
OXYGEN SATURATION: 99 % | BODY MASS INDEX: 28.1 KG/M2 | SYSTOLIC BLOOD PRESSURE: 122 MMHG | WEIGHT: 168.65 LBS | DIASTOLIC BLOOD PRESSURE: 60 MMHG | HEART RATE: 94 BPM | RESPIRATION RATE: 16 BRPM | HEIGHT: 65 IN | TEMPERATURE: 97.9 F

## 2019-09-24 VITALS
BODY MASS INDEX: 28.17 KG/M2 | WEIGHT: 169.09 LBS | SYSTOLIC BLOOD PRESSURE: 129 MMHG | HEIGHT: 65 IN | HEART RATE: 94 BPM | DIASTOLIC BLOOD PRESSURE: 77 MMHG | TEMPERATURE: 96.7 F | RESPIRATION RATE: 18 BRPM | OXYGEN SATURATION: 99 %

## 2019-09-24 DIAGNOSIS — C20 MALIGNANT NEOPLASM OF RECTUM (HCC): Chronic | ICD-10-CM

## 2019-09-24 DIAGNOSIS — C78.7 SECONDARY MALIGNANT NEOPLASM OF LIVER (HCC): ICD-10-CM

## 2019-09-24 DIAGNOSIS — C20 MALIGNANT NEOPLASM OF RECTUM (HCC): ICD-10-CM

## 2019-09-24 DIAGNOSIS — C78.7 SECONDARY MALIGNANT NEOPLASM OF LIVER (HCC): Chronic | ICD-10-CM

## 2019-09-24 DIAGNOSIS — C20 RECTAL CANCER (HCC): ICD-10-CM

## 2019-09-24 DIAGNOSIS — Z51.11 ENCOUNTER FOR ANTINEOPLASTIC CHEMOTHERAPY: ICD-10-CM

## 2019-09-24 DIAGNOSIS — Z79.01 CURRENT USE OF LONG TERM ANTICOAGULATION: Chronic | ICD-10-CM

## 2019-09-24 LAB
ALBUMIN SERPL BCP-MCNC: 4.3 G/DL (ref 3.2–4.9)
ALBUMIN/GLOB SERPL: 1.8 G/DL
ALP SERPL-CCNC: 137 U/L (ref 30–99)
ALT SERPL-CCNC: 20 U/L (ref 2–50)
ANION GAP SERPL CALC-SCNC: 7 MMOL/L (ref 0–11.9)
ANISOCYTOSIS BLD QL SMEAR: ABNORMAL
AST SERPL-CCNC: 27 U/L (ref 12–45)
BASOPHILS # BLD AUTO: 0 % (ref 0–1.8)
BASOPHILS # BLD: 0 K/UL (ref 0–0.12)
BILIRUB SERPL-MCNC: 0.7 MG/DL (ref 0.1–1.5)
BUN SERPL-MCNC: 18 MG/DL (ref 8–22)
CALCIUM SERPL-MCNC: 9.9 MG/DL (ref 8.5–10.5)
CEA SERPL-MCNC: 1.1 NG/ML (ref 0–3)
CHLORIDE SERPL-SCNC: 107 MMOL/L (ref 96–112)
CO2 SERPL-SCNC: 25 MMOL/L (ref 20–33)
CREAT SERPL-MCNC: 1.18 MG/DL (ref 0.5–1.4)
EOSINOPHIL # BLD AUTO: 0.2 K/UL (ref 0–0.51)
EOSINOPHIL NFR BLD: 3.5 % (ref 0–6.9)
ERYTHROCYTE [DISTWIDTH] IN BLOOD BY AUTOMATED COUNT: 61.5 FL (ref 35.9–50)
GLOBULIN SER CALC-MCNC: 2.4 G/DL (ref 1.9–3.5)
GLUCOSE SERPL-MCNC: 105 MG/DL (ref 65–99)
HCT VFR BLD AUTO: 41.1 % (ref 37–47)
HGB BLD-MCNC: 12.1 G/DL (ref 12–16)
LYMPHOCYTES # BLD AUTO: 1.17 K/UL (ref 1–4.8)
LYMPHOCYTES NFR BLD: 20.1 % (ref 22–41)
MANUAL DIFF BLD: NORMAL
MCH RBC QN AUTO: 26.9 PG (ref 27–33)
MCHC RBC AUTO-ENTMCNC: 29.4 G/DL (ref 33.6–35)
MCV RBC AUTO: 91.3 FL (ref 81.4–97.8)
METAMYELOCYTES NFR BLD MANUAL: 0.9 %
MICROCYTES BLD QL SMEAR: ABNORMAL
MONOCYTES # BLD AUTO: 0.71 K/UL (ref 0–0.85)
MONOCYTES NFR BLD AUTO: 12.3 % (ref 0–13.4)
MORPHOLOGY BLD-IMP: NORMAL
MYELOCYTES NFR BLD MANUAL: 0.9 %
NEUTROPHILS # BLD AUTO: 3.61 K/UL (ref 2–7.15)
NEUTROPHILS NFR BLD: 62.3 % (ref 44–72)
NRBC # BLD AUTO: 0 K/UL
NRBC BLD-RTO: 0 /100 WBC
OVALOCYTES BLD QL SMEAR: NORMAL
PLATELET # BLD AUTO: 271 K/UL (ref 164–446)
PLATELET BLD QL SMEAR: 224.4
PMV BLD AUTO: 10.3 FL (ref 9–12.9)
POIKILOCYTOSIS BLD QL SMEAR: NORMAL
POLYCHROMASIA BLD QL SMEAR: NORMAL
POTASSIUM SERPL-SCNC: 4 MMOL/L (ref 3.6–5.5)
PROT SERPL-MCNC: 6.7 G/DL (ref 6–8.2)
RBC # BLD AUTO: 4.5 M/UL (ref 4.2–5.4)
RBC BLD AUTO: PRESENT
SODIUM SERPL-SCNC: 139 MMOL/L (ref 135–145)
WBC # BLD AUTO: 5.8 K/UL (ref 4.8–10.8)

## 2019-09-24 PROCEDURE — G0498 CHEMO EXTEND IV INFUS W/PUMP: HCPCS

## 2019-09-24 PROCEDURE — 96376 TX/PRO/DX INJ SAME DRUG ADON: CPT

## 2019-09-24 PROCEDURE — 99213 OFFICE O/P EST LOW 20 MIN: CPT | Performed by: NURSE PRACTITIONER

## 2019-09-24 PROCEDURE — 700111 HCHG RX REV CODE 636 W/ 250 OVERRIDE (IP)

## 2019-09-24 PROCEDURE — 85007 BL SMEAR W/DIFF WBC COUNT: CPT

## 2019-09-24 PROCEDURE — 85027 COMPLETE CBC AUTOMATED: CPT

## 2019-09-24 PROCEDURE — 700111 HCHG RX REV CODE 636 W/ 250 OVERRIDE (IP): Performed by: NURSE PRACTITIONER

## 2019-09-24 PROCEDURE — 96374 THER/PROPH/DIAG INJ IV PUSH: CPT | Mod: XU

## 2019-09-24 PROCEDURE — 80053 COMPREHEN METABOLIC PANEL: CPT

## 2019-09-24 PROCEDURE — A4212 NON CORING NEEDLE OR STYLET: HCPCS

## 2019-09-24 PROCEDURE — 82378 CARCINOEMBRYONIC ANTIGEN: CPT

## 2019-09-24 RX ORDER — DEXTROSE MONOHYDRATE 50 MG/ML
INJECTION, SOLUTION INTRAVENOUS CONTINUOUS
Status: CANCELLED | OUTPATIENT
Start: 2019-09-24

## 2019-09-24 RX ORDER — PROCHLORPERAZINE MALEATE 10 MG
10 TABLET ORAL EVERY 6 HOURS PRN
Status: CANCELLED | OUTPATIENT
Start: 2019-09-24

## 2019-09-24 RX ORDER — LIDOCAINE HYDROCHLORIDE 10 MG/ML
10 INJECTION, SOLUTION INFILTRATION; PERINEURAL ONCE
Status: CANCELLED | OUTPATIENT
Start: 2019-09-24

## 2019-09-24 RX ORDER — DEXAMETHASONE SODIUM PHOSPHATE 4 MG/ML
8 INJECTION, SOLUTION INTRA-ARTICULAR; INTRALESIONAL; INTRAMUSCULAR; INTRAVENOUS; SOFT TISSUE ONCE
Status: COMPLETED | OUTPATIENT
Start: 2019-09-24 | End: 2019-09-24

## 2019-09-24 RX ORDER — ONDANSETRON 8 MG/1
8 TABLET, ORALLY DISINTEGRATING ORAL
Status: CANCELLED | OUTPATIENT
Start: 2019-09-24

## 2019-09-24 RX ORDER — DEXAMETHASONE SODIUM PHOSPHATE 4 MG/ML
8 INJECTION, SOLUTION INTRA-ARTICULAR; INTRALESIONAL; INTRAMUSCULAR; INTRAVENOUS; SOFT TISSUE ONCE
Status: CANCELLED | OUTPATIENT
Start: 2019-09-24

## 2019-09-24 RX ORDER — ONDANSETRON 2 MG/ML
4 INJECTION INTRAMUSCULAR; INTRAVENOUS
Status: CANCELLED | OUTPATIENT
Start: 2019-09-24

## 2019-09-24 RX ADMIN — DEXAMETHASONE SODIUM PHOSPHATE 8 MG: 4 INJECTION, SOLUTION INTRA-ARTICULAR; INTRALESIONAL; INTRAMUSCULAR; INTRAVENOUS; SOFT TISSUE at 09:05

## 2019-09-24 RX ADMIN — HEPARIN 500 UNITS: 100 SYRINGE at 07:14

## 2019-09-24 RX ADMIN — FLUOROURACIL 3590 MG: 50 INJECTION, SOLUTION INTRAVENOUS at 09:44

## 2019-09-24 ASSESSMENT — ENCOUNTER SYMPTOMS
WEIGHT LOSS: 0
SHORTNESS OF BREATH: 1
CHILLS: 0
COUGH: 0
DIARRHEA: 0
PALPITATIONS: 0
FALLS: 1
WHEEZING: 0
VOMITING: 0
DIZZINESS: 0
TINGLING: 1
NAUSEA: 0
CONSTIPATION: 0
FEVER: 0
HEADACHES: 0

## 2019-09-24 ASSESSMENT — PAIN SCALES - GENERAL: PAINLEVEL: NO PAIN

## 2019-09-24 NOTE — PROGRESS NOTES
Chemotherapy Verification - SECONDARY RN       Height = 165 cm  Weight = 76.7 kg  BSA = 1.87 m2       Medication: Home infusion 5FU  Dose: 1920 mg/m2 dr  Calculated Dose: 3599.8 mg                             (In mg/m2, AUC, mg/kg)       I confirm that this process was performed independently.

## 2019-09-24 NOTE — PROGRESS NOTES
Patient presents for port access and lab draw prior to chemotherapy later this morning. Port accessed using sterile technique, flushes well with blood drawn as ordered. Port flushed per protocol and left accessed. Patient returns at 0845 and released in no acute distress with her .

## 2019-09-24 NOTE — PROGRESS NOTES
Chemotherapy Verification - PRIMARY RN      Height = 165 cm  Weight = 76.7 kg  BSA = 1.87 m2       Medication: Adrucil  Dose: 1,920 mg/m2 over 46 hours  Calculated Dose: 3,590.4 mg                             (In mg/m2, AUC, mg/kg)           I confirm this process was performed independently with the BSA and all final chemotherapy dosing calculations congruent.  Any discrepancies of 10% or greater have been addressed with the chemotherapy pharmacist. The resolution of the discrepancy has been documented in the EPIC progress notes.

## 2019-09-24 NOTE — PROGRESS NOTES
Subjective:      Karlene Walters is a 79 y.o. 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  She presents accompanied by her  for today's visit.  Patient is scheduled to receive single agent 5-FU, cycle #84 today.    Patient is doing very well and tolerating treatment well.  She denies any significant changes to her current clinical status with the exception of a recent broken toe of the left foot.  She was seen by orthopedic yesterday and placed in a boot which she is to wear for approximately a month.  She stated she fell down stairs wearing her slippers.  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  She denies hitting her head.  This was approximately 1 week ago.    She does continue to still have shortness of breath which is intermittent at times.  She denies any coughing or wheezing.  She denies fevers chills, fatigue or weight loss.  She denies any chest pain or heart palpitations.  She does still get palpitations at the end of her treatment after her pump is removed and that lasts for just approximately the day after chemo.  She does take oxycodone at times which does help as well.  She denies any nausea, vomiting, diarrhea or constipation at this time.  Up of her ostomy is within normal limits.  She does tend to get sick every once while there is stomach and its resolved with Zofran.  She voids without difficulty.  She does have dryness to her hands but denies any rashes or itching.    Patient recently had repeat CT scans completed on September 9, 2019.  CT of the abdomen and pelvis shows the metastatic lesion in the hepatic dome is decreased in size.  She did have tiny subcapsular left hepatic lesion which is no longer seen and there were no new liver lesions noted.  There is no new adenopathy or metastatic disease noted  "within the abdomen or pelvis.  She also completed a CT chest with contrast which shows no evidence of metastatic disease.  Patient did follow-up with Dr. West, surgeon and did discuss the results of the CT abdomen and pelvis as well.        Allergies   Allergen Reactions   • Oxaliplatin Anaphylaxis   • Codeine      \"gets drunk\"   • Pcn [Penicillins] Itching   • Sulfa Drugs Itching   • Tape Rash     PAPER TAPE OK     Current Outpatient Medications on File Prior to Visit   Medication Sig Dispense Refill   • potassium chloride (MICRO-K) 10 MEQ capsule   3   • furosemide (LASIX) 40 MG Tab Take 1 Tab by mouth every day. 90 Tab 3   • raNITidine (ZANTAC) 150 MG Tab Take 1 Tab by mouth 2 Times a Day. 60 Tab 3   • rivaroxaban (XARELTO) 10 MG Tab tablet Take 1 Tab by mouth every day. 90 Tab 1   • lidocaine-prilocaine (EMLA) 2.5-2.5 % Cream APPLY A THICK FILM OVER THE PORT ACCESS SITE PRIOR TO ACCESS 30 g 3   • ondansetron (ZOFRAN) 4 MG Tab tablet Take 1 Tab by mouth as needed. 30 Tab 3   • Tiotropium Bromide-Olodaterol (STIOLTO RESPIMAT) 2.5-2.5 MCG/ACT Aero Soln Take 2 Puffs by mouth every day. 1 Inhaler 11   • albuterol (PROAIR HFA) 108 (90 Base) MCG/ACT Aero Soln inhalation aerosol Inhale 2 Puffs by mouth every four hours as needed for Shortness of Breath (wheezing). 1 Inhaler 11   • metronidazole (METROCREAM) 0.75 % cream Apply 1 Each to affected area(s) 2 times a day.     • loperamide (IMODIUM) 2 MG Cap Take 2 mg by mouth 4 times a day as needed for Diarrhea.     • cyanocobalamin (VITAMIN B-12) 500 MCG Tab Take 1,000 mcg by mouth every day.     • Multiple Vitamins-Minerals (CENTRUM SILVER PO) Take 1 Tab by mouth every day.     • Cholecalciferol (VITAMIN D3) 400 UNITS CAPS Take 1 Cap by mouth every day.     • loratadine (CLARITIN) 10 MG TABS Take 10 mg by mouth every day. Indications: Hayfever       No current facility-administered medications on file prior to visit.        Review of Systems   Constitutional: " "Negative for chills, fever, malaise/fatigue and weight loss.   Respiratory: Positive for shortness of breath (still present and intermittent at times). Negative for cough and wheezing.    Cardiovascular: Negative for chest pain and palpitations.        Still with palpitations when pump is removed - lasts for approx that day after chemo - relieves with Oxycodone at times   Gastrointestinal: Negative for constipation, diarrhea, nausea and vomiting.        Every once in a while will get sick to her stomach and take Zofran    Genitourinary: Negative for dysuria.   Musculoskeletal: Positive for falls (down stairs when walking - per patient believes it may be d/t some neuropathy in her feet from previous chemo).   Skin: Negative for itching and rash.        Dryness to her hands   Neurological: Positive for tingling. Negative for dizziness and headaches.          Objective:     /60 (BP Location: Right arm, Patient Position: Sitting, BP Cuff Size: Adult)   Pulse 94   Temp 36.6 °C (97.9 °F) (Temporal)   Resp 16   Ht 1.65 m (5' 4.96\")   Wt 76.5 kg (168 lb 10.4 oz)   LMP  (LMP Unknown)   SpO2 99%   BMI 28.10 kg/m²      Physical Exam   Constitutional: She is oriented to person, place, and time. She appears well-developed and well-nourished. No distress.   HENT:   Head: Normocephalic and atraumatic.   Mouth/Throat: Oropharynx is clear and moist. No oropharyngeal exudate.   Cardiovascular: Normal rate, regular rhythm and normal heart sounds. Exam reveals no gallop and no friction rub.   No murmur heard.  Pulmonary/Chest: Effort normal and breath sounds normal. No respiratory distress. She has no wheezes.   Abdominal: Soft. Bowel sounds are normal. She exhibits no distension. There is no tenderness.   Musculoskeletal: Normal range of motion. She exhibits no edema or tenderness.   Neurological: She is alert and oriented to person, place, and time.   Skin: Skin is warm and dry. No rash noted. She is not diaphoretic. No " erythema. No pallor.   Psychiatric: She has a normal mood and affect. Her behavior is normal.   Vitals reviewed.    Results for KANG HALL    Ref. Range 9/24/2019 07:15   WBC Latest Ref Range: 4.8 - 10.8 K/uL 5.8   RBC Latest Ref Range: 4.20 - 5.40 M/uL 4.50   Hemoglobin Latest Ref Range: 12.0 - 16.0 g/dL 12.1   Hematocrit Latest Ref Range: 37.0 - 47.0 % 41.1   MCV Latest Ref Range: 81.4 - 97.8 fL 91.3   MCH Latest Ref Range: 27.0 - 33.0 pg 26.9 (L)   MCHC Latest Ref Range: 33.6 - 35.0 g/dL 29.4 (L)   RDW Latest Ref Range: 35.9 - 50.0 fL 61.5 (H)   Platelet Count Latest Ref Range: 164 - 446 K/uL 271   MPV Latest Ref Range: 9.0 - 12.9 fL 10.3   Neutrophils-Polys Latest Ref Range: 44.00 - 72.00 % 62.30   Neutrophils (Absolute) Latest Ref Range: 2.00 - 7.15 K/uL 3.61   Lymphocytes Latest Ref Range: 22.00 - 41.00 % 20.10 (L)   Lymphs (Absolute) Latest Ref Range: 1.00 - 4.80 K/uL 1.17   Monocytes Latest Ref Range: 0.00 - 13.40 % 12.30   Monos (Absolute) Latest Ref Range: 0.00 - 0.85 K/uL 0.71   Eosinophils Latest Ref Range: 0.00 - 6.90 % 3.50   Eos (Absolute) Latest Ref Range: 0.00 - 0.51 K/uL 0.20   Basophils Latest Ref Range: 0.00 - 1.80 % 0.00   Baso (Absolute) Latest Ref Range: 0.00 - 0.12 K/uL 0.00   Metamyelocytes Latest Units: % 0.90   Myelocytes Latest Units: % 0.90   Nucleated RBC Latest Units: /100 WBC 0.00   NRBC (Absolute) Latest Units: K/uL 0.00   Plt Estimation Unknown 224.4   RBC Morphology Unknown Present   Anisocytosis Unknown 1+   Microcytosis Unknown 1+   Polychromia Unknown 1+   Poikilocytosis Unknown 1+   Ovalocytes Unknown 1+   Peripheral Smear Review Unknown see below   Manual Diff Status Unknown PERFORMED   Sodium Latest Ref Range: 135 - 145 mmol/L 139   Potassium Latest Ref Range: 3.6 - 5.5 mmol/L 4.0   Chloride Latest Ref Range: 96 - 112 mmol/L 107   Co2 Latest Ref Range: 20 - 33 mmol/L 25   Anion Gap Latest Ref Range: 0.0 - 11.9  7.0   Glucose Latest Ref Range: 65 - 99 mg/dL 105 (H)    Bun Latest Ref Range: 8 - 22 mg/dL 18   Creatinine Latest Ref Range: 0.50 - 1.40 mg/dL 1.18   GFR If  Latest Ref Range: >60 mL/min/1.73 m 2 53 (A)   GFR If Non  Latest Ref Range: >60 mL/min/1.73 m 2 44 (A)   Calcium Latest Ref Range: 8.5 - 10.5 mg/dL 9.9   AST(SGOT) Latest Ref Range: 12 - 45 U/L 27   ALT(SGPT) Latest Ref Range: 2 - 50 U/L 20   Alkaline Phosphatase Latest Ref Range: 30 - 99 U/L 137 (H)   Total Bilirubin Latest Ref Range: 0.1 - 1.5 mg/dL 0.7   Albumin Latest Ref Range: 3.2 - 4.9 g/dL 4.3   Total Protein Latest Ref Range: 6.0 - 8.2 g/dL 6.7   Globulin Latest Ref Range: 1.9 - 3.5 g/dL 2.4   A-G Ratio Latest Units: g/dL 1.8   Carcinoembryonic Antigen Latest Ref Range: 0.0 - 3.0 ng/mL 1.1       Ct-abdomen With & W/o, Pelvis With    Result Date: 9/9/2019 9/9/2019 10:12 AM HISTORY/REASON FOR EXAM:  History of colon/rectal cancer with liver metastases, follow-up. TECHNIQUE/EXAM DESCRIPTION:  Quadphase CT scan of the abdomen without and with contrast. Initial precontrast images were obtained from the diaphragmatic domes through the iliac crests using helical technique.  Following the administration of nonionic contrast in an intravenous bolus fashion, and postcontrast, thin-section helical scanning obtained from the diaphragmatic domes through the iliac crests.  Imaging was obtained in the arterial, venous, and 5 minute delayed phase of contrast enhancement. Additional contrast enhanced helical scanning of the pelvis was obtained from the iliac crest through the pubic symphysis. 100 mL of Omnipaque 350 nonionic contrast was administered without complication. Low dose optimization technique was utilized for this CT exam including automated exposure control and adjustment of the mA and/or kV according to patient size. COMPARISON:  CT dated 3/4/2019. FINDINGS: Lower chest: Discussed separately. Liver: Metastatic lesion in the liver dome has decreased in size. It measures 3.7 x  3.7 cm, previously 5 x 4.6 cm. It contains some calcifications in its periphery. Mild intrahepatic biliary dilatation adjacent to the lesion. Lesion in the subcapsular left hepatic dome described on prior study has resolved. No new hepatic lesions are identified. Unchanged hypertrophy of the left hepatic lobe relative to the right. No intrahepatic biliary dilatation. Gallbladder: Removed. Common bile duct: Nondilated. Pancreas: Unremarkable. Spleen: No mass. Adrenals: No mass. Kidneys: No mass. No hydronephrosis. Stomach, small bowel, colon: Tiny hiatal hernia. Ostomy in the right lower quadrant. Bowel anastomosis in the right hemipelvis. No bowel wall thickening or obstruction. Peritoneal cavity: No ascites. Lymph nodes: No enlarged nodes by size criteria. Aorta: No aneurysm. Atherosclerosis. Pelvic organs: Hysterectomy. Pelvic floor dysfunction, with a moderate cystocele, and a large enterocele/peritoneocele. Musculoskeletal structures: No acute fracture or destructive lesion. Spondylosis.     1.  Metastatic lesion in the hepatic dome has decreased in size. Tiny subcapsular left hepatic lesion is no longer seen. No new liver lesions. 2.  No new adenopathy or metastatic disease in the abdomen or pelvis. 3.  Postsurgical changes in the abdomen and pelvis with a right lower quadrant ostomy. 4.  Pelvic floor dysfunction with moderate cystocele and severe enterocele/peritoneocele.    Ct-chest (thorax) With    Result Date: 9/9/2019 9/9/2019 10:12 AM HISTORY/REASON FOR EXAM:  History of colorectal cancer with liver metastases. TECHNIQUE/EXAM DESCRIPTION: CT scan of the chest with contrast. Thin-section helical images were obtained from the lung apices through the adrenal glands following the bolus administration of contrast. 100 mL of Omnipaque 350 nonionic contrast was utilized. Low dose optimization technique was utilized for this CT exam including automated exposure control and adjustment of the mA and/or kV  according to patient size. COMPARISON:  6/29/2018 chest CT, 10/25/2018 PET/CT. FINDINGS: Lungs: No focal consolidation. Chronic scarring along the right major fissure. Some atelectasis/scarring in the left lower lobe. No suspicious nodules or masses. No pleural effusions. Mediastinum: Unchanged tiny nodules in the right thyroid gland, with unchanged right thyroid calcification. No mediastinal mass. Nodes: No enlarged lymph nodes. Heart: Not enlarged. No pericardial effusion. Aorta and great vessels: No aneurysm. Atherosclerosis. Musculoskeletal structures: No acute fracture or destructive lesion. Spondylosis. Chest port in the left chest wall. Upper abdomen: Reported separately.     No metastatic disease in the chest.          Assessment/Plan:     1. Malignant neoplasm of rectum (HCC)  REFERRAL TO ONCOLOGY NURSE NAVIGATOR This patient has a screening score of (must be 6 or above): 0    DISCONTINUED: dexamethasone (DECADRON) injection 8 mg   2. Secondary malignant neoplasm of liver (HCC)  REFERRAL TO ONCOLOGY NURSE NAVIGATOR This patient has a screening score of (must be 6 or above): 0   3. Current use of long term anticoagulation     4. Encounter for antineoplastic chemotherapy       Plan  1.  Patient with metastatic rectal carcinoma currently on single agent 5-FU.  She has had good response and tolerating it very well.  Her most recent CT scan showed no evidence of metastatic disease in the chest, abdomen or pelvis.  I did review her CBC, CMP and her labs are appropriate to proceed with treatment as planned.  CEA continues to be stable at 1.1 today.    2.  Patient has been on Xarelto at 10 mg daily for a history of pulmonary embolism.  She tolerates this well.    3. Patient has been on Zantac for quite some time and she is concerned with regards to newest data presented. She stated she attempted to stop it for approximately 3 days and did not feel any significant changes.  However she is back on the medication is  taking it twice a day.  I asked patient to slowly wean off of it and go down to 1 tab a day for approximately 2 weeks and then go ahead and discontinue it altogether after 2 weeks.  Did educate patient she may have some increased in reflux when initially stopping it but that should eventually go away after being off the medication.    4.  Patient question whether she can get a flu shot.  I discussed with patient that it should not be a concern that she would need to make sure she gets the dead vaccine injection.  She states she likes to get them between October and November and I will discuss further with Dr. Steele as well.  I will update patient once confirmed with Dr. Steele.    5.  Patient to continue with treatment every 2 weeks as scheduled and will follow-up in the clinic in 4 weeks or sooner if needed.

## 2019-09-24 NOTE — PROGRESS NOTES
Patient presents for [place]. Patient in good spirits, voices no new complaints. Port accessed this AM, flushes well with brisk blood return. Pre-medications administered as ordered. Patient connected to home infusion pump. Patient returns Thursday and released in no acute distress with her .

## 2019-09-24 NOTE — PROGRESS NOTES
"Pharmacy Chemotherapy Calculation:    Patient Name: Karlene Walters   Dx: Colon CA        Cycle 92 ([place] cycle 84)    Previous treatment: 9/10/19    Protocol: 5-FU + Leucovorin      IV over 2 hours on Day 1, followed by    IVP on Day 1, followed by                               -- 10/31/17: delete Leucovorin and 5-FU push d/t neutropenia per Dr. Hardy   Fluorouracil  continuous infusion over 46-48 hours                              -- 20% reduction (1920 mg/m²) to be continued starting C28 per C Alsop APRN   14 day cycles for 12 cycles (adjuvant) or until disease progression or unacceptable toxicity  NCCN Guidelines for Colon Cancer. V.2.2015.  Jay T, et al. Eur J Cancer.1999;35(9):1343-7.      Allergies: Codeine; Oxaliplatin; Pcn; Sulfa drugs; and Tape       /77   Pulse 94   Temp 36.1 °C (97 °F) (Temporal) Comment (Src): v/s transfered from this am.  Resp 18   Ht 1.65 m (5' 4.96\")   Wt 76.7 kg (169 lb 1.5 oz)   LMP  (LMP Unknown)   SpO2 99%   BMI 28.17 kg/m²  Body surface area is 1.87 meters squared.    Labs 09/24/19    ANC~3600 Plt = 271k Hgb = 12.1 SCr = 1.18 mg/dL CrCl~47 mL/min LFTs/Tbili = WNL except alk phos 137 Tbili = 0.7    Fluorouracil (5-FU) 1920 mg/m² x 1.87m² = 3590mg              <10% difference, OK to treat with final dose = 3590mg CIVI via CADD pump @ 1.6mL/hr over 46 hrs.      Lalit Andrade, PharmD, BCOP                "

## 2019-09-24 NOTE — PROGRESS NOTES
"Pharmacy Chemotherapy Verification    Patient Name: Karlene Walters  Dx: Colon CA        Protocol: 5-FU + Leucovorin      IV over 2 hours on Day 1, followed by    IVP on Day 1, followed by         -- 10/31/17: delete Leucovorin and 5-FU push d/t neutropenia per Dr. Hardy   Fluorouracil   IV continuous infusion daily on Days 1 and 2 ( IV over 46-48 hours)        -- 20% reduction (1920 mg/m²) to be continued starting C28 per C Alsop APRN   Q14 day cycles for 12 cycles (adjuvant) or until disease progression or unacceptable toxicity  NCCN Guidelines for Colon Cancer. V.2.2015.  Jay T, et al. Eur J Cancer.1999;35(9):1343-7.      Allergies:  Codeine; Oxaliplatin; Pcn; Sulfa drugs; and Tape     /77   Pulse 94   Temp 36.1 °C (97 °F) (Temporal) Comment (Src): v/s transfered from this am.  Resp 18   Ht 1.65 m (5' 4.96\")   Wt 76.7 kg (169 lb 1.5 oz)   LMP  (LMP Unknown)   SpO2 99%   BMI 28.17 kg/m²  Body surface area is 1.87 meters squared.    Labs 9/24/19  ANC~ 3610 Plt = 271k   Hgb = 12.1     SCr = 1.18 mg/dL CrCl ~ 46.5 mL/min   LFT's = 27/20/137 TBili = 0.7     Drug Order   (Drug name, dose, route, IV Fluid & volume, frequency, number of doses) Cycle 92 (C84 in [place])   Previous treatment: 9/10/19   Medication = Fluorouracil (5-FU)   Base Dose = 1920 mg/m²  Calc Dose:Base Dose x 1.87 m² = 3590.4 mg  Final Dose = 3590 mg   Route = CIVI  Drug Conc = 50 mg/mL  Fluid & Volume = 71.8 mL (+3 mL overfill = 74.8 mL)  Admin Duration = CIVI over 46 hrs @ 1.6 mL/hr   Via CADD pump for home infusion        <10% difference, OK to treat with final dose     By my signature below, I confirm this process was performed independently with the BSA and all final chemotherapy dosing calculations congruent. I have reviewed the above chemotherapy order and that my calculation of the final dose and BSA (when applicable) corroborate those calculations of the  pharmacist.     Demetrius Maldonado, PharmD    "

## 2019-09-25 ENCOUNTER — TELEPHONE (OUTPATIENT)
Dept: HEMATOLOGY ONCOLOGY | Facility: MEDICAL CENTER | Age: 79
End: 2019-09-25

## 2019-09-25 NOTE — TELEPHONE ENCOUNTER
----- Message from ARTURO Krishnan sent at 9/24/2019  5:15 PM PDT -----  Can you call patient and let her know it is okay for her to get the flu shot

## 2019-09-25 NOTE — TELEPHONE ENCOUNTER
Left message for patient to return call. Please let her know per Dr. Steele it is okay for her to get a flu shot.

## 2019-09-26 ENCOUNTER — OUTPATIENT INFUSION SERVICES (OUTPATIENT)
Dept: ONCOLOGY | Facility: MEDICAL CENTER | Age: 79
End: 2019-09-26
Attending: INTERNAL MEDICINE
Payer: MEDICARE

## 2019-09-26 VITALS
RESPIRATION RATE: 18 BRPM | DIASTOLIC BLOOD PRESSURE: 63 MMHG | SYSTOLIC BLOOD PRESSURE: 123 MMHG | HEIGHT: 65 IN | OXYGEN SATURATION: 100 % | TEMPERATURE: 98.3 F | BODY MASS INDEX: 28.69 KG/M2 | HEART RATE: 89 BPM | WEIGHT: 172.18 LBS

## 2019-09-26 DIAGNOSIS — C20 MALIGNANT NEOPLASM OF RECTUM (HCC): ICD-10-CM

## 2019-09-26 PROCEDURE — 700111 HCHG RX REV CODE 636 W/ 250 OVERRIDE (IP): Performed by: NURSE PRACTITIONER

## 2019-09-26 PROCEDURE — 96523 IRRIG DRUG DELIVERY DEVICE: CPT | Performed by: CLINICAL NURSE SPECIALIST

## 2019-09-26 RX ADMIN — HEPARIN 500 UNITS: 100 SYRINGE at 08:33

## 2019-09-26 NOTE — PROGRESS NOTES
"Patient to infusion for pump disconnect.  Pump off upon arrival.  Patient states pump read \"complete\".  Patient denies any new complaints.  Continues with LOPEZ.  She is wearing a left foot walking boot as she broke her toe one week ago.  5FU pump disconnected.  Port flushed, heparin locked and deaccessed.  Patient discharged in stable condition.   "

## 2019-10-01 RX ORDER — ONDANSETRON 2 MG/ML
4 INJECTION INTRAMUSCULAR; INTRAVENOUS
Status: CANCELLED | OUTPATIENT
Start: 2019-10-08

## 2019-10-01 RX ORDER — DEXAMETHASONE SODIUM PHOSPHATE 4 MG/ML
8 INJECTION, SOLUTION INTRA-ARTICULAR; INTRALESIONAL; INTRAMUSCULAR; INTRAVENOUS; SOFT TISSUE ONCE
Status: CANCELLED | OUTPATIENT
Start: 2019-10-08

## 2019-10-01 RX ORDER — DEXTROSE MONOHYDRATE 50 MG/ML
INJECTION, SOLUTION INTRAVENOUS CONTINUOUS
Status: CANCELLED | OUTPATIENT
Start: 2019-10-08

## 2019-10-01 RX ORDER — ONDANSETRON 8 MG/1
8 TABLET, ORALLY DISINTEGRATING ORAL
Status: CANCELLED | OUTPATIENT
Start: 2019-10-08

## 2019-10-01 RX ORDER — PROCHLORPERAZINE MALEATE 10 MG
10 TABLET ORAL EVERY 6 HOURS PRN
Status: CANCELLED | OUTPATIENT
Start: 2019-10-08

## 2019-10-01 RX ORDER — LIDOCAINE HYDROCHLORIDE 10 MG/ML
10 INJECTION, SOLUTION INFILTRATION; PERINEURAL ONCE
Status: CANCELLED | OUTPATIENT
Start: 2019-10-08

## 2019-10-07 NOTE — PROGRESS NOTES
"Pharmacy Chemotherapy Verification    Patient Name: Karlene Walters   Dx: Colon CA        Cycle 93 ([place] cycle 85)    Previous treatment: 9/24/19    Protocol: 5-FU     IV over 2 hours on Day 1, followed by    IVP on Day 1, followed by                               -- 10/31/17: delete Leucovorin and 5-FU push d/t neutropenia per Dr. Hardy   Fluorouracil  continuous infusion over 46-48 hours                              -- 20% reduction (1920 mg/m²) to be continued starting C28 per C Alsop APRN   14 day cycles for 12 cycles (adjuvant) or until disease progression or unacceptable toxicity  NCCN Guidelines for Colon Cancer. V.2.2015.  Jay T, et al. Eur J Cancer.1999;35(9):1343-7.      Allergies: Codeine; Oxaliplatin; Pcn; Sulfa drugs; and Tape     /88   Pulse (!) 107   Temp 36.9 °C (98.4 °F) (Temporal)   Resp 18   Ht 1.65 m (5' 4.96\")   Wt 76.7 kg (169 lb 1.5 oz)   LMP  (LMP Unknown)   SpO2 100%   BMI 28.17 kg/m²  Body surface area is 1.87 meters squared.    Labs 10/8/19  ANC 3690 Hgb 12.6 Plt 268k  SCr 1.1 CrCl 49.8 mL/min   AST/ALT/AP = 32/23/137 Tbili = 0.8    Fluorouracil (5-FU) 1920 mg/m² x 1.87 m² = 3590.4 mg              <10% difference, OK to treat with final dose = 3590 mg CIVI via CADD pump @ 1.6 mL/hr over 46 hrs    Lora Hernandez, PharmD, BCOP                  "

## 2019-10-08 ENCOUNTER — OUTPATIENT INFUSION SERVICES (OUTPATIENT)
Dept: ONCOLOGY | Facility: MEDICAL CENTER | Age: 79
End: 2019-10-08
Attending: INTERNAL MEDICINE
Payer: MEDICARE

## 2019-10-08 VITALS
TEMPERATURE: 98.4 F | WEIGHT: 169.09 LBS | HEIGHT: 65 IN | BODY MASS INDEX: 28.17 KG/M2 | SYSTOLIC BLOOD PRESSURE: 143 MMHG | OXYGEN SATURATION: 100 % | HEART RATE: 107 BPM | DIASTOLIC BLOOD PRESSURE: 88 MMHG | RESPIRATION RATE: 18 BRPM

## 2019-10-08 DIAGNOSIS — C20 RECTAL CANCER (HCC): ICD-10-CM

## 2019-10-08 DIAGNOSIS — C20 MALIGNANT NEOPLASM OF RECTUM (HCC): ICD-10-CM

## 2019-10-08 DIAGNOSIS — R94.4 DECREASED GFR: ICD-10-CM

## 2019-10-08 LAB
ALBUMIN SERPL BCP-MCNC: 4.1 G/DL (ref 3.2–4.9)
ALBUMIN/GLOB SERPL: 1.5 G/DL
ALP SERPL-CCNC: 137 U/L (ref 30–99)
ALT SERPL-CCNC: 23 U/L (ref 2–50)
ANION GAP SERPL CALC-SCNC: 10 MMOL/L (ref 0–11.9)
AST SERPL-CCNC: 32 U/L (ref 12–45)
BASOPHILS # BLD AUTO: 0.8 % (ref 0–1.8)
BASOPHILS # BLD: 0.05 K/UL (ref 0–0.12)
BILIRUB SERPL-MCNC: 0.8 MG/DL (ref 0.1–1.5)
BUN SERPL-MCNC: 18 MG/DL (ref 8–22)
CALCIUM SERPL-MCNC: 9.1 MG/DL (ref 8.5–10.5)
CEA SERPL-MCNC: 1.1 NG/ML (ref 0–3)
CHLORIDE SERPL-SCNC: 106 MMOL/L (ref 96–112)
CO2 SERPL-SCNC: 22 MMOL/L (ref 20–33)
CREAT SERPL-MCNC: 1.1 MG/DL (ref 0.5–1.4)
EOSINOPHIL # BLD AUTO: 0.25 K/UL (ref 0–0.51)
EOSINOPHIL NFR BLD: 4 % (ref 0–6.9)
ERYTHROCYTE [DISTWIDTH] IN BLOOD BY AUTOMATED COUNT: 60.9 FL (ref 35.9–50)
GLOBULIN SER CALC-MCNC: 2.7 G/DL (ref 1.9–3.5)
GLUCOSE SERPL-MCNC: 126 MG/DL (ref 65–99)
HCT VFR BLD AUTO: 40.8 % (ref 37–47)
HGB BLD-MCNC: 12.6 G/DL (ref 12–16)
IMM GRANULOCYTES # BLD AUTO: 0.02 K/UL (ref 0–0.11)
IMM GRANULOCYTES NFR BLD AUTO: 0.3 % (ref 0–0.9)
LYMPHOCYTES # BLD AUTO: 1.44 K/UL (ref 1–4.8)
LYMPHOCYTES NFR BLD: 23.2 % (ref 22–41)
MCH RBC QN AUTO: 27.6 PG (ref 27–33)
MCHC RBC AUTO-ENTMCNC: 30.9 G/DL (ref 33.6–35)
MCV RBC AUTO: 89.5 FL (ref 81.4–97.8)
MONOCYTES # BLD AUTO: 0.76 K/UL (ref 0–0.85)
MONOCYTES NFR BLD AUTO: 12.2 % (ref 0–13.4)
NEUTROPHILS # BLD AUTO: 3.69 K/UL (ref 2–7.15)
NEUTROPHILS NFR BLD: 59.5 % (ref 44–72)
NRBC # BLD AUTO: 0 K/UL
NRBC BLD-RTO: 0 /100 WBC
PLATELET # BLD AUTO: 268 K/UL (ref 164–446)
PMV BLD AUTO: 10 FL (ref 9–12.9)
POTASSIUM SERPL-SCNC: 3.9 MMOL/L (ref 3.6–5.5)
PROT SERPL-MCNC: 6.8 G/DL (ref 6–8.2)
RBC # BLD AUTO: 4.56 M/UL (ref 4.2–5.4)
SODIUM SERPL-SCNC: 138 MMOL/L (ref 135–145)
WBC # BLD AUTO: 6.2 K/UL (ref 4.8–10.8)

## 2019-10-08 PROCEDURE — 700111 HCHG RX REV CODE 636 W/ 250 OVERRIDE (IP)

## 2019-10-08 PROCEDURE — 85025 COMPLETE CBC W/AUTO DIFF WBC: CPT

## 2019-10-08 PROCEDURE — G0498 CHEMO EXTEND IV INFUS W/PUMP: HCPCS

## 2019-10-08 PROCEDURE — 96374 THER/PROPH/DIAG INJ IV PUSH: CPT | Mod: XU

## 2019-10-08 PROCEDURE — 700111 HCHG RX REV CODE 636 W/ 250 OVERRIDE (IP): Performed by: INTERNAL MEDICINE

## 2019-10-08 PROCEDURE — 82378 CARCINOEMBRYONIC ANTIGEN: CPT

## 2019-10-08 PROCEDURE — 80053 COMPREHEN METABOLIC PANEL: CPT

## 2019-10-08 PROCEDURE — A4212 NON CORING NEEDLE OR STYLET: HCPCS

## 2019-10-08 RX ORDER — DEXAMETHASONE SODIUM PHOSPHATE 4 MG/ML
8 INJECTION, SOLUTION INTRA-ARTICULAR; INTRALESIONAL; INTRAMUSCULAR; INTRAVENOUS; SOFT TISSUE ONCE
Status: COMPLETED | OUTPATIENT
Start: 2019-10-08 | End: 2019-10-08

## 2019-10-08 RX ADMIN — FLUOROURACIL 3590 MG: 50 INJECTION, SOLUTION INTRAVENOUS at 10:01

## 2019-10-08 RX ADMIN — DEXAMETHASONE SODIUM PHOSPHATE 8 MG: 4 INJECTION, SOLUTION INTRA-ARTICULAR; INTRALESIONAL; INTRAMUSCULAR; INTRAVENOUS; SOFT TISSUE at 09:16

## 2019-10-08 NOTE — PROGRESS NOTES
Pt arrived to [facility], ambulatory, for 5FU. Pt voices no complaints. Port accessed in sterile manner, positive blood return noted. Labs drawn and reviewed, pt wihtin parameters to treat today. Dexamethasone administered with no s/sx of adverse reaction. 5FU CADD pump connected with no complications. Pt left IS with no s/sx of distress. Follow up appointment confirmed.

## 2019-10-08 NOTE — PROGRESS NOTES
Chemotherapy Verification - SECONDARY RN       Height = 165 cm  Weight = 76.7 kg  BSA = 1.87 m2       Medication: Home infusion 5FU  Dose: 1920 mg/m2  Calculated Dose: 3599.8 mg                             (In mg/m2, AUC, mg/kg)       I confirm that this process was performed independently.

## 2019-10-08 NOTE — PROGRESS NOTES
Chemotherapy Verification - PRIMARY RN      Height = 165 cm  Weight = 76.7 kg  BSA = 1.87 m2       Medication: Fluorouracil  Dose: 1,920 mg/m2  Calculated Dose: 3,590.4 mg                             (In mg/m2, AUC, mg/kg)       I confirm this process was performed independently with the BSA and all final chemotherapy dosing calculations congruent.  Any discrepancies of 10% or greater have been addressed with the chemotherapy pharmacist. The resolution of the discrepancy has been documented in the EPIC progress notes.

## 2019-10-08 NOTE — PROGRESS NOTES
"Pharmacy Chemotherapy Verification    Patient Name: Karlene Walters  Dx: Colon CA        Protocol: 5-FU + Leucovorin      IV over 2 hours on Day 1, followed by    IVP on Day 1, followed by         -- 10/31/17: delete Leucovorin and 5-FU push d/t neutropenia per Dr. Hardy   Fluorouracil   IV continuous infusion daily on Days 1 and 2 ( IV over 46-48 hours)        -- 20% reduction (1920 mg/m²) to be continued starting C28 per C Alsop APRN   Q14 day cycles for 12 cycles (adjuvant) or until disease progression or unacceptable toxicity  NCCN Guidelines for Colon Cancer. V.2.2015.  Jay GROVER, et al. Eur J Cancer.1999;35(9):1343-7.      Allergies:  Codeine; Oxaliplatin; Pcn; Sulfa drugs; and Tape       /88   Pulse (!) 107   Temp 36.9 °C (98.4 °F) (Temporal)   Resp 18   Ht 1.65 m (5' 4.96\")   Wt 76.7 kg (169 lb 1.5 oz)   LMP  (LMP Unknown)   SpO2 100%   BMI 28.17 kg/m²  Body surface area is 1.87 meters squared.    Labs 10/08/19  ANC~ 3700 Plt = 268k   Hgb = 12.6   SCr = 1.10 mg/dL CrCl ~ 50 mL/min   LFT's = 32/23/137 TBili = 0.8     Drug Order   (Drug name, dose, route, IV Fluid & volume, frequency, number of doses) Cycle 93 (C85 in [place])   Previous treatment: 9/24/19   Medication = Fluorouracil (5-FU)   Base Dose = 1920 mg/m²  Calc Dose:Base Dose x 1.87 m² = 3590.4 mg  Final Dose = 3590 mg   Route = CIVI  Drug Conc = 50 mg/mL  Fluid & Volume = 71.8 mL (+3 mL overfill = 74.8 mL)  Admin Duration = CIVI over 46 hrs @ 1.6 mL/hr   Via CADD pump for home infusion        <10% difference, OK to treat with final dose     By my signature below, I confirm this process was performed independently with the BSA and all final chemotherapy dosing calculations congruent. I have reviewed the above chemotherapy order and that my calculation of the final dose and BSA (when applicable) corroborate those calculations of the  pharmacist.     Lalit Andrade, PharmD, BCOP    "

## 2019-10-10 ENCOUNTER — OUTPATIENT INFUSION SERVICES (OUTPATIENT)
Dept: ONCOLOGY | Facility: MEDICAL CENTER | Age: 79
End: 2019-10-10
Attending: INTERNAL MEDICINE
Payer: MEDICARE

## 2019-10-10 VITALS
HEIGHT: 65 IN | SYSTOLIC BLOOD PRESSURE: 141 MMHG | OXYGEN SATURATION: 100 % | BODY MASS INDEX: 28.8 KG/M2 | HEART RATE: 89 BPM | WEIGHT: 172.84 LBS | DIASTOLIC BLOOD PRESSURE: 69 MMHG | TEMPERATURE: 97 F | RESPIRATION RATE: 18 BRPM

## 2019-10-10 DIAGNOSIS — C20 MALIGNANT NEOPLASM OF RECTUM (HCC): ICD-10-CM

## 2019-10-10 DIAGNOSIS — R94.4 DECREASED GFR: ICD-10-CM

## 2019-10-10 PROCEDURE — 700111 HCHG RX REV CODE 636 W/ 250 OVERRIDE (IP)

## 2019-10-10 PROCEDURE — 96523 IRRIG DRUG DELIVERY DEVICE: CPT

## 2019-10-10 RX ADMIN — HEPARIN 500 UNITS: 100 SYRINGE at 10:43

## 2019-10-17 NOTE — PROGRESS NOTES
"10/21/19    Subjective    Chief Complaint:  79 female colorectal cancer -> liver on single agent 5FU    HPI:  Initial dx 2012. Liver, lung metastases in 8/2015. Liver ablation -> XELOX - allergic reaction to Oxaliplatin so switched to single agent Xeloda and then 5FU because of toxicity. In 5/2017 treated with Y90 and then in 10/2018 microwave ablation by Dr. West. Maintenance 5FU ever since. At some point had a PE and has been on Xarelto 10 mg daily. Lives in [place] with pharmacist . Recent CT scans 9/9/19 show no diease in the chest and continued shrinkage of liver lesions. Only c/o shortness of breath and frequent moderately liquid bowel movements in colostomy. Was on Zantac until recent recall of the drug. Now suing \"gas-ex\".    ROS:    Constitutional: No weight loss  Skin:No rash or jaundice  HENT:No c/o's  Cardiovascular: Sees a cardiologist but has no sx's.  Respiratory: SOB  GI:See PI  :No c/o's  Musculoskeletal:No c/o's  Neuro:Some numbness left foot but it's in a brace s/p fx  Psych:No c/o's    PMH:    Allergies   Allergen Reactions   • Oxaliplatin Anaphylaxis   • Codeine      \"gets drunk\"   • Pcn [Penicillins] Itching   • Sulfa Drugs Itching   • Tape Rash     PAPER TAPE OK       Medications:    Current Outpatient Medications on File Prior to Visit   Medication Sig Dispense Refill   • potassium chloride (MICRO-K) 10 MEQ capsule   3   • furosemide (LASIX) 40 MG Tab Take 1 Tab by mouth every day. 90 Tab 3   • rivaroxaban (XARELTO) 10 MG Tab tablet Take 1 Tab by mouth every day. 90 Tab 1   • lidocaine-prilocaine (EMLA) 2.5-2.5 % Cream APPLY A THICK FILM OVER THE PORT ACCESS SITE PRIOR TO ACCESS 30 g 3   • ondansetron (ZOFRAN) 4 MG Tab tablet Take 1 Tab by mouth as needed. 30 Tab 3   • Tiotropium Bromide-Olodaterol (STIOLTO RESPIMAT) 2.5-2.5 MCG/ACT Aero Soln Take 2 Puffs by mouth every day. 1 Inhaler 11   • albuterol (PROAIR HFA) 108 (90 Base) MCG/ACT Aero Soln inhalation aerosol Inhale 2 " "Puffs by mouth every four hours as needed for Shortness of Breath (wheezing). 1 Inhaler 11   • metronidazole (METROCREAM) 0.75 % cream Apply 1 Each to affected area(s) 2 times a day.     • loperamide (IMODIUM) 2 MG Cap Take 2 mg by mouth 4 times a day as needed for Diarrhea.     • cyanocobalamin (VITAMIN B-12) 500 MCG Tab Take 1,000 mcg by mouth every day.     • Multiple Vitamins-Minerals (CENTRUM SILVER PO) Take 1 Tab by mouth every day.     • Cholecalciferol (VITAMIN D3) 400 UNITS CAPS Take 1 Cap by mouth every day.     • loratadine (CLARITIN) 10 MG TABS Take 10 mg by mouth every day. Indications: Hayfever       No current facility-administered medications on file prior to visit.          Objective    Vitals:    /80 (BP Location: Right arm, Patient Position: Sitting, BP Cuff Size: Adult)   Pulse 94   Temp 36.9 °C (98.4 °F) (Temporal)   Resp 14   Ht 1.67 m (5' 5.75\")   Wt 76.8 kg (169 lb 5 oz)   LMP  (LMP Unknown)   SpO2 95%   BMI 27.54 kg/m²     Physical Exam:    Appears well-developed and well-nourished. No distress.    Head -  Normocephalic .   Eyes - Pupils are equal, round, and reactive to light. Conjunctivae and EOM are normal. No scleral icterus.   Ears - normal hearing  Mouth - Throat: Oropharynx is clear and moist. No oropharyngeal exudate  Neck - Normal range of motion. Neck supple. No thyromegaly  Cardiovascular - Normal rate, regular rhythm, normal heart sounds and intact distal pulses. No  gallop, murmur or rub  Pulmonary - Normal breath sounds.  No wheeze, rales or rhonci  Breast - symmetrical. No mass on indentation.  Abdominal -Soft. No distension, tenderness, organomegaly or mass. Colostomy on right looks ok  Extremities-  No edema or tenderness. Left foot in a walking brace   Nodes - No submental, submandibular, preauricular, cervical, axillary or inguinal adenopathy.    Neurological -   Alert and oriented to person, place, and time. No focal findings  Skin - Skin is warm and dry. No " rash noted. Not diaphoretic. No erythema. No pallor. No jaundice   Psychiatric -  Cheerful mood and affect.    Labs:    Results for KANG HALL (MRN [id number])    Ref. Range 10/8/2019 08:40   WBC Latest Ref Range: 4.8 - 10.8 K/uL 6.2   RBC Latest Ref Range: 4.20 - 5.40 M/uL 4.56   Hemoglobin Latest Ref Range: 12.0 - 16.0 g/dL 12.6   Hematocrit Latest Ref Range: 37.0 - 47.0 % 40.8   MCV Latest Ref Range: 81.4 - 97.8 fL 89.5   MCH Latest Ref Range: 27.0 - 33.0 pg 27.6   MCHC Latest Ref Range: 33.6 - 35.0 g/dL 30.9 (L)   RDW Latest Ref Range: 35.9 - 50.0 fL 60.9 (H)   Platelet Count Latest Ref Range: 164 - 446 K/uL 268   MPV Latest Ref Range: 9.0 - 12.9 fL 10.0   Neutrophils-Polys Latest Ref Range: 44.00 - 72.00 % 59.50   Neutrophils (Absolute) Latest Ref Range: 2.00 - 7.15 K/uL 3.69   Lymphocytes Latest Ref Range: 22.00 - 41.00 % 23.20   Lymphs (Absolute) Latest Ref Range: 1.00 - 4.80 K/uL 1.44   Monocytes Latest Ref Range: 0.00 - 13.40 % 12.20      Ref. Range 10/8/2019 08:40   Sodium Latest Ref Range: 135 - 145 mmol/L 138   Potassium Latest Ref Range: 3.6 - 5.5 mmol/L 3.9   Chloride Latest Ref Range: 96 - 112 mmol/L 106   Co2 Latest Ref Range: 20 - 33 mmol/L 22   Anion Gap Latest Ref Range: 0.0 - 11.9  10.0   Glucose Latest Ref Range: 65 - 99 mg/dL 126 (H)   Bun Latest Ref Range: 8 - 22 mg/dL 18   Creatinine Latest Ref Range: 0.50 - 1.40 mg/dL 1.10   GFR If  Latest Ref Range: >60 mL/min/1.73 m 2 58 (A)   GFR If Non  Latest Ref Range: >60 mL/min/1.73 m 2 48 (A)   Calcium Latest Ref Range: 8.5 - 10.5 mg/dL 9.1   AST(SGOT) Latest Ref Range: 12 - 45 U/L 32   ALT(SGPT) Latest Ref Range: 2 - 50 U/L 23   Alkaline Phosphatase Latest Ref Range: 30 - 99 U/L 137 (H)   Total Bilirubin Latest Ref Range: 0.1 - 1.5 mg/dL 0.8   Albumin Latest Ref Range: 3.2 - 4.9 g/dL 4.1   Total Protein Latest Ref Range: 6.0 - 8.2 g/dL 6.8   Globulin Latest Ref Range: 1.9 - 3.5 g/dL 2.7   A-G Ratio Latest  Units: g/dL 1.5      Ref. Range 10/8/2019 08:40   Carcinoembryonic Antigen Latest Ref Range: 0.0 - 3.0 ng/mL 1.1     Assessment    Imp:    Visit Diagnosis:    1. Malignant neoplasm of rectum (HCC)     2. Secondary malignant neoplasm of liver (HCC)     3. Current use of long term anticoagulation           Plan:  Chemo tomorrow and in 2 weeks  Ella in 4 weeks.     Lucho Steele M.D.

## 2019-10-21 ENCOUNTER — OFFICE VISIT (OUTPATIENT)
Dept: HEMATOLOGY ONCOLOGY | Facility: MEDICAL CENTER | Age: 79
End: 2019-10-21
Payer: MEDICARE

## 2019-10-21 VITALS
SYSTOLIC BLOOD PRESSURE: 134 MMHG | DIASTOLIC BLOOD PRESSURE: 80 MMHG | BODY MASS INDEX: 27.21 KG/M2 | OXYGEN SATURATION: 95 % | HEIGHT: 66 IN | HEART RATE: 94 BPM | RESPIRATION RATE: 14 BRPM | WEIGHT: 169.31 LBS | TEMPERATURE: 98.4 F

## 2019-10-21 DIAGNOSIS — Z79.01 CURRENT USE OF LONG TERM ANTICOAGULATION: Chronic | ICD-10-CM

## 2019-10-21 DIAGNOSIS — C20 MALIGNANT NEOPLASM OF RECTUM (HCC): Chronic | ICD-10-CM

## 2019-10-21 DIAGNOSIS — C78.7 SECONDARY MALIGNANT NEOPLASM OF LIVER (HCC): Chronic | ICD-10-CM

## 2019-10-21 PROCEDURE — 99213 OFFICE O/P EST LOW 20 MIN: CPT | Performed by: INTERNAL MEDICINE

## 2019-10-21 RX ORDER — ONDANSETRON 2 MG/ML
4 INJECTION INTRAMUSCULAR; INTRAVENOUS
Status: CANCELLED | OUTPATIENT
Start: 2019-10-22

## 2019-10-21 RX ORDER — PROCHLORPERAZINE MALEATE 10 MG
10 TABLET ORAL EVERY 6 HOURS PRN
Status: CANCELLED | OUTPATIENT
Start: 2019-10-22

## 2019-10-21 RX ORDER — DEXTROSE MONOHYDRATE 50 MG/ML
INJECTION, SOLUTION INTRAVENOUS CONTINUOUS
Status: CANCELLED | OUTPATIENT
Start: 2019-10-22

## 2019-10-21 RX ORDER — LIDOCAINE HYDROCHLORIDE 10 MG/ML
10 INJECTION, SOLUTION INFILTRATION; PERINEURAL ONCE
Status: CANCELLED | OUTPATIENT
Start: 2019-10-22

## 2019-10-21 RX ORDER — ONDANSETRON 8 MG/1
8 TABLET, ORALLY DISINTEGRATING ORAL
Status: CANCELLED | OUTPATIENT
Start: 2019-10-22

## 2019-10-21 RX ORDER — DEXAMETHASONE SODIUM PHOSPHATE 4 MG/ML
8 INJECTION, SOLUTION INTRA-ARTICULAR; INTRALESIONAL; INTRAMUSCULAR; INTRAVENOUS; SOFT TISSUE ONCE
Status: CANCELLED | OUTPATIENT
Start: 2019-10-22

## 2019-10-22 ENCOUNTER — OUTPATIENT INFUSION SERVICES (OUTPATIENT)
Dept: ONCOLOGY | Facility: MEDICAL CENTER | Age: 79
End: 2019-10-22
Attending: INTERNAL MEDICINE
Payer: MEDICARE

## 2019-10-22 ENCOUNTER — DOCUMENTATION (OUTPATIENT)
Dept: HEMATOLOGY ONCOLOGY | Facility: MEDICAL CENTER | Age: 79
End: 2019-10-22

## 2019-10-22 VITALS
DIASTOLIC BLOOD PRESSURE: 73 MMHG | TEMPERATURE: 98 F | HEIGHT: 65 IN | WEIGHT: 168.43 LBS | BODY MASS INDEX: 28.06 KG/M2 | HEART RATE: 101 BPM | OXYGEN SATURATION: 100 % | SYSTOLIC BLOOD PRESSURE: 131 MMHG | RESPIRATION RATE: 18 BRPM

## 2019-10-22 DIAGNOSIS — R94.4 DECREASED GFR: ICD-10-CM

## 2019-10-22 DIAGNOSIS — C20 MALIGNANT NEOPLASM OF RECTUM (HCC): ICD-10-CM

## 2019-10-22 LAB
ALBUMIN SERPL BCP-MCNC: 4 G/DL (ref 3.2–4.9)
ALBUMIN/GLOB SERPL: 1.6 G/DL
ALP SERPL-CCNC: 132 U/L (ref 30–99)
ALT SERPL-CCNC: 21 U/L (ref 2–50)
ANION GAP SERPL CALC-SCNC: 10 MMOL/L (ref 0–11.9)
AST SERPL-CCNC: 27 U/L (ref 12–45)
BASOPHILS # BLD AUTO: 0.8 % (ref 0–1.8)
BASOPHILS # BLD: 0.03 K/UL (ref 0–0.12)
BILIRUB SERPL-MCNC: 0.7 MG/DL (ref 0.1–1.5)
BUN SERPL-MCNC: 15 MG/DL (ref 8–22)
CALCIUM SERPL-MCNC: 9.2 MG/DL (ref 8.5–10.5)
CHLORIDE SERPL-SCNC: 108 MMOL/L (ref 96–112)
CO2 SERPL-SCNC: 21 MMOL/L (ref 20–33)
CREAT SERPL-MCNC: 1.07 MG/DL (ref 0.5–1.4)
EOSINOPHIL # BLD AUTO: 0.17 K/UL (ref 0–0.51)
EOSINOPHIL NFR BLD: 4.3 % (ref 0–6.9)
ERYTHROCYTE [DISTWIDTH] IN BLOOD BY AUTOMATED COUNT: 62.4 FL (ref 35.9–50)
GLOBULIN SER CALC-MCNC: 2.5 G/DL (ref 1.9–3.5)
GLUCOSE SERPL-MCNC: 99 MG/DL (ref 65–99)
HCT VFR BLD AUTO: 38.3 % (ref 37–47)
HGB BLD-MCNC: 12 G/DL (ref 12–16)
IMM GRANULOCYTES # BLD AUTO: 0.01 K/UL (ref 0–0.11)
IMM GRANULOCYTES NFR BLD AUTO: 0.3 % (ref 0–0.9)
LYMPHOCYTES # BLD AUTO: 1.09 K/UL (ref 1–4.8)
LYMPHOCYTES NFR BLD: 27.7 % (ref 22–41)
MCH RBC QN AUTO: 27.8 PG (ref 27–33)
MCHC RBC AUTO-ENTMCNC: 31.3 G/DL (ref 33.6–35)
MCV RBC AUTO: 88.7 FL (ref 81.4–97.8)
MONOCYTES # BLD AUTO: 0.56 K/UL (ref 0–0.85)
MONOCYTES NFR BLD AUTO: 14.2 % (ref 0–13.4)
NEUTROPHILS # BLD AUTO: 2.07 K/UL (ref 2–7.15)
NEUTROPHILS NFR BLD: 52.7 % (ref 44–72)
NRBC # BLD AUTO: 0 K/UL
NRBC BLD-RTO: 0 /100 WBC
PLATELET # BLD AUTO: 233 K/UL (ref 164–446)
PMV BLD AUTO: 10.1 FL (ref 9–12.9)
POTASSIUM SERPL-SCNC: 3.8 MMOL/L (ref 3.6–5.5)
PROT SERPL-MCNC: 6.5 G/DL (ref 6–8.2)
RBC # BLD AUTO: 4.32 M/UL (ref 4.2–5.4)
SODIUM SERPL-SCNC: 139 MMOL/L (ref 135–145)
WBC # BLD AUTO: 3.9 K/UL (ref 4.8–10.8)

## 2019-10-22 PROCEDURE — 96374 THER/PROPH/DIAG INJ IV PUSH: CPT

## 2019-10-22 PROCEDURE — 80053 COMPREHEN METABOLIC PANEL: CPT

## 2019-10-22 PROCEDURE — G0498 CHEMO EXTEND IV INFUS W/PUMP: HCPCS

## 2019-10-22 PROCEDURE — 82378 CARCINOEMBRYONIC ANTIGEN: CPT

## 2019-10-22 PROCEDURE — A4212 NON CORING NEEDLE OR STYLET: HCPCS

## 2019-10-22 PROCEDURE — 700111 HCHG RX REV CODE 636 W/ 250 OVERRIDE (IP): Performed by: INTERNAL MEDICINE

## 2019-10-22 PROCEDURE — 700111 HCHG RX REV CODE 636 W/ 250 OVERRIDE (IP)

## 2019-10-22 PROCEDURE — 85025 COMPLETE CBC W/AUTO DIFF WBC: CPT

## 2019-10-22 RX ORDER — DEXAMETHASONE SODIUM PHOSPHATE 4 MG/ML
8 INJECTION, SOLUTION INTRA-ARTICULAR; INTRALESIONAL; INTRAMUSCULAR; INTRAVENOUS; SOFT TISSUE ONCE
Status: COMPLETED | OUTPATIENT
Start: 2019-10-22 | End: 2019-10-22

## 2019-10-22 RX ADMIN — FLUOROURACIL 3590 MG: 50 INJECTION, SOLUTION INTRAVENOUS at 10:37

## 2019-10-22 RX ADMIN — DEXAMETHASONE SODIUM PHOSPHATE 8 MG: 4 INJECTION, SOLUTION INTRA-ARTICULAR; INTRALESIONAL; INTRAMUSCULAR; INTRAVENOUS; SOFT TISSUE at 10:10

## 2019-10-22 NOTE — PROGRESS NOTES
"Pharmacy Chemotherapy Calculations:    Patient Name: Karlene Walters   Dx: Colon CA        Cycle 94 ([place] cycle 86)    Previous treatment: 10/08/19    Protocol: 5-FU     IV over 2 hours on Day 1, followed by    IVP on Day 1, followed by                               -- 10/31/17: delete Leucovorin and 5-FU push d/t neutropenia per Dr. Hardy   Fluorouracil  continuous infusion over 46-48 hours                              -- 20% reduction (1920 mg/m²) to be continued starting C28 per C Alsop APRN   14 day cycles for 12 cycles (adjuvant) or until disease progression or unacceptable toxicity  NCCN Guidelines for Colon Cancer. V.2.2015.  Jay T, et al. Eur J Cancer.1999;35(9):1343-7.      Allergies: Codeine; Oxaliplatin; Pcn; Sulfa drugs; and Tape     /73   Pulse (!) 101   Temp 36.7 °C (98 °F) (Temporal)   Resp 18   Ht 1.65 m (5' 4.96\") Comment: 165cm with ortho shoes.  Wt 76.4 kg (168 lb 6.9 oz)   LMP  (LMP Unknown)   SpO2 100%   BMI 28.06 kg/m²  Body surface area is 1.87 meters squared.    Labs 10/22/19  ANC ~2100 Hgb = 12 Plt = 268k SCr = 1.07 mg/dL CrCl ~51 mL/min   AST/ALT/AP = 27/21/132 Tbili = 0.7    Fluorouracil (5-FU) 1920 mg/m² x 1.87 m² = 3590.4 mg              <10% difference, OK to treat with final dose = 3590 mg CIVI via CADD pump @ 1.6 mL/hr over 46 hrs      Lalit Andrade, PharmD, BCOP                    "

## 2019-10-22 NOTE — PROGRESS NOTES
"Nutrition Services: RD Consultation/ New Chemotherapy Start  Pt presented to appointment with spouse. Met with pt to assess current intake, appetite, and nutritional status. Pt appears adequately nourished. Pt states has good appetite, mentions is eating all the \"bad foods\"; when asked what pt meant by this, pt motioned to muffin sitting on tray table. States wants to eat healthy though has been finding it difficult lately. Mentions wanting to go to the [facility] Nutrition class, though is not able to; requests the handouts if possible. Denies any weight loss, though has had some weight gain, says UBW is 155 lbs, though hasn't weighed this in years. Per chart review, pt weighing within range of 169-172 lbs since 8/28/19, with no weight loss indicated. Denies GI distress, no n/v/d/c. Mentions has ileostomy and some foods, such as leafy greens, are not tolerated well. Is not drinking Boost, though expresses interest. States will sometimes buy and forgets about them. Pt did not express any further nutrition-related questions or concerns at this time.     Assessment:  - Primary Dx: Malignant neoplasm of rectum  - PMHx: secondary malignant neoplasm of liver, ileostomy  - Height: 5'4\"   -   Weight: 169 lbs  -   BMI: 28 (Overweight classification)  -Recent Weight Change: Pt maintained weight for the past 2 months, with no indication of recent weight loss per chart review and per pt report.     Malnutrition risk: No criteria noted at this time.     Plan/Recommendations:  1. Provided and discussed handout regarding general nutrition guidelines as well as nutritional recommendations for patient's with Colorectal Cancer, including tips/tricks should side effects of tx occur.   2. Discussed importance of PO intake/ nutrition with increased protein/kcal needs 2' to the hypermetabolic effects of cancer in order to maintain weight and preserve lean body mass.   3. Provided and went over food lists including healthy snack ideas as " well as foods high in protein.   4. Discussed gradual changes to support a healthy diet. Discussed focusing on one meal a time. Provided examples of what this might look like. Encouraged focus on maintaining weight and muscle mass at this time. Encouraged balanced diet. Discussed plant-based diet.   5. To Follow-up with Class handouts at next visit  6. Pt left before RD was able to provide Boost samples. Will provide at next visit as appropriate.     Pt reports understanding and was receptive. RD following and to make further recommendations as indicated.

## 2019-10-22 NOTE — PROGRESS NOTES
Chemotherapy Verification - SECONDARY RN       Height = 165 cm  Weight = 76.4 kg  BSA = 1.87 m2       Medication: Adrucil  Dose: 1,920 mg/m2  Calculated Dose: 3,590.4 mg                             (In mg/m2, AUC, mg/kg)       I confirm that this process was performed independently.

## 2019-10-22 NOTE — PROGRESS NOTES
"Pharmacy Chemotherapy Verification    Patient Name: Karlene Walters  Dx: Colon CA        Protocol: 5-FU + Leucovorin      IV over 2 hours on Day 1, followed by    IVP on Day 1, followed by         -- 10/31/17: delete Leucovorin and 5-FU push d/t neutropenia per Dr. Hardy   Fluorouracil   IV continuous infusion daily on Days 1 and 2 ( IV over 46-48 hours)        -- 20% reduction (1920 mg/m²) to be continued starting C28 per C Alsop APRN   Q14 day cycles for 12 cycles (adjuvant) or until disease progression or unacceptable toxicity  NCCN Guidelines for Colon Cancer. V.2.2015.  Jay T, et al. Eur J Cancer.1999;35(9):1343-7.      Allergies:  Codeine; Oxaliplatin; Pcn; Sulfa drugs; and Tape     /73   Pulse (!) 101   Temp 36.7 °C (98 °F) (Temporal)   Resp 18   Ht 1.65 m (5' 4.96\") Comment: 165cm with ortho shoes.  Wt 76.4 kg (168 lb 6.9 oz)   LMP  (LMP Unknown)   SpO2 100%   BMI 28.06 kg/m²  Body surface area is 1.87 meters squared.    Labs 10/22/19  ANC~ 2070 Plt = 233k   Hgb = 12     SCr = 1.07 mg/dL CrCl ~ 51 mL/min   LFT's = 27/21/132 TBili = 0.7     Drug Order   (Drug name, dose, route, IV Fluid & volume, frequency, number of doses) Cycle 94 (C86 in [place])   Previous treatment: C93 on 10/8/19   Medication = Fluorouracil (5-FU)   Base Dose = 1920 mg/m²  Calc Dose:Base Dose x 1.87 m² = 3590.4 mg  Final Dose = 3590 mg   Route = CIVI  Drug Conc = 50 mg/mL  Fluid & Volume = 71.8 mL (+3 mL overfill = 74.8 mL)  Admin Duration = CIVI over 46 hrs @ 1.6 mL/hr   Via CADD pump for home infusion        <10% difference, OK to treat with final dose     By my signature below, I confirm this process was performed independently with the BSA and all final chemotherapy dosing calculations congruent. I have reviewed the above chemotherapy order and that my calculation of the final dose and BSA (when applicable) corroborate those calculations of the  pharmacist.     Demetrius Maldonado, PharmD    "

## 2019-10-22 NOTE — PROGRESS NOTES
Chemotherapy Verification - PRIMARY RN      Height = 165 cm  Weight = 76.4 kg  BSA = 1.87 m2       Medication: Fluorouracil  Dose: 1,920 mg/m2  Calculated Dose: 3,590.4 mg                             (In mg/m2, AUC, mg/kg)       I confirm this process was performed independently with the BSA and all final chemotherapy dosing calculations congruent.  Any discrepancies of 10% or greater have been addressed with the chemotherapy pharmacist. The resolution of the discrepancy has been documented in the EPIC progress notes.

## 2019-10-23 ENCOUNTER — TELEPHONE (OUTPATIENT)
Dept: HEMATOLOGY ONCOLOGY | Facility: MEDICAL CENTER | Age: 79
End: 2019-10-23

## 2019-10-23 LAB — CEA SERPL-MCNC: 0.9 NG/ML (ref 0–3)

## 2019-10-23 NOTE — TELEPHONE ENCOUNTER
Patient called and had Qs regarding getting her ears pierced. After speaking with Dr. Steele at her last appt, patient was told that she could get her ears pierced. Patient needs a signed doctor's note allowing her to move forward. Karlene is looking at having it done at the following location:  [facility]  [email or web address]  Patient would appreciate a call back.

## 2019-10-24 ENCOUNTER — OUTPATIENT INFUSION SERVICES (OUTPATIENT)
Dept: ONCOLOGY | Facility: MEDICAL CENTER | Age: 79
End: 2019-10-24
Attending: INTERNAL MEDICINE
Payer: MEDICARE

## 2019-10-24 VITALS
TEMPERATURE: 97 F | OXYGEN SATURATION: 100 % | DIASTOLIC BLOOD PRESSURE: 65 MMHG | RESPIRATION RATE: 18 BRPM | BODY MASS INDEX: 27.88 KG/M2 | SYSTOLIC BLOOD PRESSURE: 148 MMHG | HEART RATE: 115 BPM | WEIGHT: 167.33 LBS | HEIGHT: 65 IN

## 2019-10-24 DIAGNOSIS — R94.4 DECREASED GFR: ICD-10-CM

## 2019-10-24 DIAGNOSIS — C20 MALIGNANT NEOPLASM OF RECTUM (HCC): ICD-10-CM

## 2019-10-24 PROCEDURE — 700111 HCHG RX REV CODE 636 W/ 250 OVERRIDE (IP)

## 2019-10-24 PROCEDURE — 96523 IRRIG DRUG DELIVERY DEVICE: CPT

## 2019-10-24 RX ADMIN — HEPARIN 500 UNITS: 100 SYRINGE at 11:48

## 2019-11-04 RX ORDER — DEXTROSE MONOHYDRATE 50 MG/ML
INJECTION, SOLUTION INTRAVENOUS CONTINUOUS
Status: CANCELLED | OUTPATIENT
Start: 2019-11-05

## 2019-11-04 RX ORDER — LIDOCAINE HYDROCHLORIDE 10 MG/ML
10 INJECTION, SOLUTION INFILTRATION; PERINEURAL ONCE
Status: CANCELLED | OUTPATIENT
Start: 2019-11-05

## 2019-11-04 RX ORDER — ONDANSETRON 8 MG/1
8 TABLET, ORALLY DISINTEGRATING ORAL
Status: CANCELLED | OUTPATIENT
Start: 2019-11-05

## 2019-11-04 RX ORDER — PROCHLORPERAZINE MALEATE 10 MG
10 TABLET ORAL EVERY 6 HOURS PRN
Status: CANCELLED | OUTPATIENT
Start: 2019-11-05

## 2019-11-04 RX ORDER — DEXAMETHASONE SODIUM PHOSPHATE 4 MG/ML
8 INJECTION, SOLUTION INTRA-ARTICULAR; INTRALESIONAL; INTRAMUSCULAR; INTRAVENOUS; SOFT TISSUE ONCE
Status: CANCELLED | OUTPATIENT
Start: 2019-11-05

## 2019-11-04 RX ORDER — ONDANSETRON 2 MG/ML
4 INJECTION INTRAMUSCULAR; INTRAVENOUS
Status: CANCELLED | OUTPATIENT
Start: 2019-11-05

## 2019-11-05 ENCOUNTER — OUTPATIENT INFUSION SERVICES (OUTPATIENT)
Dept: ONCOLOGY | Facility: MEDICAL CENTER | Age: 79
End: 2019-11-05
Attending: INTERNAL MEDICINE
Payer: MEDICARE

## 2019-11-05 ENCOUNTER — DOCUMENTATION (OUTPATIENT)
Dept: HEMATOLOGY ONCOLOGY | Facility: MEDICAL CENTER | Age: 79
End: 2019-11-05

## 2019-11-05 VITALS
TEMPERATURE: 98 F | HEIGHT: 65 IN | WEIGHT: 168.87 LBS | HEART RATE: 102 BPM | SYSTOLIC BLOOD PRESSURE: 132 MMHG | OXYGEN SATURATION: 99 % | BODY MASS INDEX: 28.14 KG/M2 | DIASTOLIC BLOOD PRESSURE: 78 MMHG | RESPIRATION RATE: 18 BRPM

## 2019-11-05 DIAGNOSIS — C20 MALIGNANT NEOPLASM OF RECTUM (HCC): ICD-10-CM

## 2019-11-05 LAB
ALBUMIN SERPL BCP-MCNC: 4 G/DL (ref 3.2–4.9)
ALBUMIN/GLOB SERPL: 1.5 G/DL
ALP SERPL-CCNC: 125 U/L (ref 30–99)
ALT SERPL-CCNC: 18 U/L (ref 2–50)
ANION GAP SERPL CALC-SCNC: 9 MMOL/L (ref 0–11.9)
AST SERPL-CCNC: 23 U/L (ref 12–45)
BASOPHILS # BLD AUTO: 0.7 % (ref 0–1.8)
BASOPHILS # BLD: 0.03 K/UL (ref 0–0.12)
BILIRUB SERPL-MCNC: 0.6 MG/DL (ref 0.1–1.5)
BUN SERPL-MCNC: 20 MG/DL (ref 8–22)
CALCIUM SERPL-MCNC: 9.6 MG/DL (ref 8.5–10.5)
CEA SERPL-MCNC: 1 NG/ML (ref 0–3)
CHLORIDE SERPL-SCNC: 109 MMOL/L (ref 96–112)
CO2 SERPL-SCNC: 23 MMOL/L (ref 20–33)
CREAT SERPL-MCNC: 1.12 MG/DL (ref 0.5–1.4)
EOSINOPHIL # BLD AUTO: 0.19 K/UL (ref 0–0.51)
EOSINOPHIL NFR BLD: 4.2 % (ref 0–6.9)
ERYTHROCYTE [DISTWIDTH] IN BLOOD BY AUTOMATED COUNT: 63 FL (ref 35.9–50)
GLOBULIN SER CALC-MCNC: 2.6 G/DL (ref 1.9–3.5)
GLUCOSE SERPL-MCNC: 104 MG/DL (ref 65–99)
HCT VFR BLD AUTO: 39.2 % (ref 37–47)
HGB BLD-MCNC: 12.1 G/DL (ref 12–16)
IMM GRANULOCYTES # BLD AUTO: 0.01 K/UL (ref 0–0.11)
IMM GRANULOCYTES NFR BLD AUTO: 0.2 % (ref 0–0.9)
LYMPHOCYTES # BLD AUTO: 1 K/UL (ref 1–4.8)
LYMPHOCYTES NFR BLD: 22.2 % (ref 22–41)
MCH RBC QN AUTO: 27.4 PG (ref 27–33)
MCHC RBC AUTO-ENTMCNC: 30.9 G/DL (ref 33.6–35)
MCV RBC AUTO: 88.7 FL (ref 81.4–97.8)
MONOCYTES # BLD AUTO: 0.74 K/UL (ref 0–0.85)
MONOCYTES NFR BLD AUTO: 16.4 % (ref 0–13.4)
NEUTROPHILS # BLD AUTO: 2.53 K/UL (ref 2–7.15)
NEUTROPHILS NFR BLD: 56.3 % (ref 44–72)
NRBC # BLD AUTO: 0 K/UL
NRBC BLD-RTO: 0 /100 WBC
PLATELET # BLD AUTO: 217 K/UL (ref 164–446)
PMV BLD AUTO: 10.1 FL (ref 9–12.9)
POTASSIUM SERPL-SCNC: 4.2 MMOL/L (ref 3.6–5.5)
PROT SERPL-MCNC: 6.6 G/DL (ref 6–8.2)
RBC # BLD AUTO: 4.42 M/UL (ref 4.2–5.4)
SODIUM SERPL-SCNC: 141 MMOL/L (ref 135–145)
WBC # BLD AUTO: 4.5 K/UL (ref 4.8–10.8)

## 2019-11-05 PROCEDURE — 700111 HCHG RX REV CODE 636 W/ 250 OVERRIDE (IP)

## 2019-11-05 PROCEDURE — A4212 NON CORING NEEDLE OR STYLET: HCPCS

## 2019-11-05 PROCEDURE — 82378 CARCINOEMBRYONIC ANTIGEN: CPT

## 2019-11-05 PROCEDURE — 96375 TX/PRO/DX INJ NEW DRUG ADDON: CPT

## 2019-11-05 PROCEDURE — 96374 THER/PROPH/DIAG INJ IV PUSH: CPT | Mod: XU

## 2019-11-05 PROCEDURE — 85025 COMPLETE CBC W/AUTO DIFF WBC: CPT

## 2019-11-05 PROCEDURE — 700111 HCHG RX REV CODE 636 W/ 250 OVERRIDE (IP): Performed by: NURSE PRACTITIONER

## 2019-11-05 PROCEDURE — G0498 CHEMO EXTEND IV INFUS W/PUMP: HCPCS

## 2019-11-05 PROCEDURE — 80053 COMPREHEN METABOLIC PANEL: CPT

## 2019-11-05 RX ORDER — DEXAMETHASONE SODIUM PHOSPHATE 4 MG/ML
8 INJECTION, SOLUTION INTRA-ARTICULAR; INTRALESIONAL; INTRAMUSCULAR; INTRAVENOUS; SOFT TISSUE ONCE
Status: COMPLETED | OUTPATIENT
Start: 2019-11-05 | End: 2019-11-05

## 2019-11-05 RX ADMIN — FLUOROURACIL 3590 MG: 50 INJECTION, SOLUTION INTRAVENOUS at 10:45

## 2019-11-05 RX ADMIN — DEXAMETHASONE SODIUM PHOSPHATE 8 MG: 4 INJECTION, SOLUTION INTRA-ARTICULAR; INTRALESIONAL; INTRAMUSCULAR; INTRAVENOUS; SOFT TISSUE at 10:26

## 2019-11-05 NOTE — PROGRESS NOTES
Late entry for 1045:  Pre medication administered per MD orders/per MAR.  Pt connected to CIVI of 5FU via CADD pump per MAR for 46 hr infusion.  Pump and pump settings verified by RN's x 2 at patient chairside.  Pump observed to be running at 1.6 mL/hr.  Pt left infusion services in no apparent distress after completion of treatment, ambulatory.  Pt to return 11/07/19 for pump de-access; appointment scheduled and confirmed.

## 2019-11-05 NOTE — PROGRESS NOTES
Nutrition Services: Brief Update  Weight: 168 lbs, weighed today in Infusion  Weight History:  169 lbs on 10/22/19  Weight Change: Pt weight remains stable     RD able to visit pt during Infusion appointment. Pt states is doing very well, states appetite is still great. Denies n/v, denies any issues with ileostomy. Mentions an occasion where was feeling a bit sick and didn't want to eat. States has Boost supplements in the fridge and will start to drink those if this occurs again. Denies questions/concerns for RD.     Plan/Recommend:  • Encouraged pt to continue with good work.   • Provided pt with Eating well After Cancer Treatment recipes as requested.     Pt verbalizes understanding and is receptive. RD to continue to monitor throughout treatment.  Please contact [phone]

## 2019-11-05 NOTE — PROGRESS NOTES
CBC results available, reviewed, and within parameters established by MD for treatment today.  Pt updated and verbalizes understanding.

## 2019-11-05 NOTE — PROGRESS NOTES
Pt to infusion services ambulatory per self.  Pt here for scheduled chemotherapy, connect to CIVI of 5FU via CADD pump.  Plan of care reviewed.  Pt verbalizes understanding.  Pt with L chest port with EMLA cream in place.  Port site cleansed and port accessed in sterile fashion.  Port flushes easily; +brisk blood return verified.  Labs drawn per MD orders.  Blood samples sent to lab.  Pt resting in chair.  Call light at hand.

## 2019-11-05 NOTE — PROGRESS NOTES
Chemotherapy Verification - PRIMARY RN      Height = 64.96 inches  Weight = 76.6 kg  BSA = 1.87 m2       Medication: Fluorouracil (Adrucil)  Dose: 1920 mg/m2  Calc ulated Dose: 3590.4 mg                             (In mg/m2, AUC, mg/kg)         I confirm this process was performed independently with the BSA and all final chemotherapy dosing calculations congruent.  Any discrepancies of 10% or greater have been addressed with the chemotherapy pharmacist. The resolution of the discrepancy has been documented in the EPIC progress notes.

## 2019-11-05 NOTE — PROGRESS NOTES
Chemotherapy Verification - SECONDARY RN       Height = 165 cm  Weight = 76.6 kg  BSA = 1.873 m2       Medication: Home infusion 5FU  Dose: 1920 mg/m2  Calculated Dose: 3597.5 mg over 46 hours                          (In mg/m2, AUC, mg/kg)       I confirm that this process was performed independently.

## 2019-11-05 NOTE — PROGRESS NOTES
"Pharmacy Chemotherapy Verification:    Patient Name: Karlene Walters  Dx: Colon CA        Protocol: 5-FU + Leucovorin      IV over 2 hours on Day 1, followed by    IVP on Day 1, followed by         -- 10/31/17: delete Leucovorin and 5-FU push d/t neutropenia per Dr. Hardy   Fluorouracil   IV continuous infusion daily on Days 1 and 2 ( IV over 46-48 hours)        -- 20% reduction (1920 mg/m²) to be continued starting C28 per C Alsop APRN   Q14 day cycles for 12 cycles (adjuvant) or until disease progression or unacceptable toxicity  NCCN Guidelines for Colon Cancer. V.2.2015.  Jay T, et al. Eur J Cancer.1999;35(9):1343-7.      Allergies:  Codeine; Oxaliplatin; Pcn; Sulfa drugs; and Tape      /78   Pulse (!) 102   Temp 36.7 °C (98 °F) (Temporal)   Resp 18   Ht 1.65 m (5' 4.96\")   Wt 76.6 kg (168 lb 14 oz)   LMP  (LMP Unknown)   SpO2 99%   BMI 28.14 kg/m²  Body surface area is 1.87 meters squared.    Labs 11/05/19  ANC~ 2500 Plt = 217k   Hgb = 12.1   SCr = 1.12 mg/dL CrCl ~ 49 mL/min   LFT's = 23/18/125 TBili = 0.6     Drug Order   (Drug name, dose, route, IV Fluid & volume, frequency, number of doses) Cycle 95 (C87 in [place])   Previous treatment: C94 on 10/22/19   Medication = Fluorouracil (5-FU)   Base Dose = 1920 mg/m²  Calc Dose:Base Dose x 1.87 m² = 3590.4 mg  Final Dose = 3590 mg   Route = CIVI  Drug Conc = 50 mg/mL  Fluid & Volume = 71.8 mL (+3 mL overfill = 74.8 mL)  Admin Duration = CIVI over 46 hrs @ 1.6 mL/hr   Via CADD pump for home infusion        <10% difference, OK to treat with final dose     By my signature below, I confirm this process was performed independently with the BSA and all final chemotherapy dosing calculations congruent. I have reviewed the above chemotherapy order and that my calculation of the final dose and BSA (when applicable) corroborate those calculations of the  pharmacist.     Lalit Andrade, PharmD, BCOP    "

## 2019-11-05 NOTE — PROGRESS NOTES
"Pharmacy Chemotherapy Verification  Patient Name: Karlene Walters   Dx: Colon CA        Cycle 95 ([place] cycle 87)    Previous treatment: 10/22/19    Protocol: 5-FU     IV over 2 hours on Day 1, followed by    IVP on Day 1, followed by                               -- 10/31/17: delete Leucovorin and 5-FU push d/t neutropenia per Dr. Hardy   Fluorouracil  continuous infusion over 46-48 hours                              -- 20% reduction (1920 mg/m²) to be continued starting C28 per C Alsop APRN   14 day cycles for 12 cycles (adjuvant) or until disease progression or unacceptable toxicity  NCCN Guidelines for Colon Cancer. V.2.2015.  Jay T, et al. Eur J Cancer.1999;35(9):1343-7.      Allergies: Codeine; Oxaliplatin; Pcn; Sulfa drugs; and Tape     /78   Pulse (!) 102   Temp 36.7 °C (98 °F) (Temporal)   Resp 18   Ht 1.65 m (5' 4.96\")   Wt 76.6 kg (168 lb 14 oz)   LMP  (LMP Unknown)   SpO2 99%   BMI 28.14 kg/m²  Body surface area is 1.87 meters squared.    Labs 11/5/19  ANC 2530 Hgb 12.1 Plt 217k  SCr 1.12 CrCl 48.8 mL/min  AST/ALT/AP = 23/18/125 Tbili = 0.6    Fluorouracil (5-FU) 1920 mg/m² x 1.87 m² = 3590.4 mg              <10% difference, OK to treat with final dose = 3590 mg CIVI via CADD pump @ 1.6 mL/hr over 46 hrs    Lora Hernandez, PharmD, BCOP                    "

## 2019-11-07 ENCOUNTER — OUTPATIENT INFUSION SERVICES (OUTPATIENT)
Dept: ONCOLOGY | Facility: MEDICAL CENTER | Age: 79
End: 2019-11-07
Attending: INTERNAL MEDICINE
Payer: MEDICARE

## 2019-11-07 VITALS
RESPIRATION RATE: 18 BRPM | SYSTOLIC BLOOD PRESSURE: 111 MMHG | OXYGEN SATURATION: 98 % | TEMPERATURE: 97.2 F | WEIGHT: 170.86 LBS | HEIGHT: 65 IN | HEART RATE: 109 BPM | DIASTOLIC BLOOD PRESSURE: 67 MMHG | BODY MASS INDEX: 28.47 KG/M2

## 2019-11-07 PROCEDURE — 96523 IRRIG DRUG DELIVERY DEVICE: CPT

## 2019-11-07 PROCEDURE — 700111 HCHG RX REV CODE 636 W/ 250 OVERRIDE (IP)

## 2019-11-07 RX ADMIN — HEPARIN 500 UNITS: 100 SYRINGE at 11:59

## 2019-11-07 NOTE — PROGRESS NOTES
Pt arrived ambulatory to [facility] for CADD pump de-access. Pump volume reads zero balance. Pump disconnected. Port with brisk blood return, flushed with NS then heparin. Port de-accessed, needle intact, gauze dressing applied. Pt discharged to self care, [facility]. Pt aware of next appt 11/19/19.

## 2019-11-11 DIAGNOSIS — Z86.711 HISTORY OF PULMONARY EMBOLUS (PE): ICD-10-CM

## 2019-11-11 DIAGNOSIS — C78.7 SECONDARY MALIGNANT NEOPLASM OF LIVER (HCC): Chronic | ICD-10-CM

## 2019-11-11 DIAGNOSIS — C20 RECTAL CANCER (HCC): Chronic | ICD-10-CM

## 2019-11-11 RX ORDER — RIVAROXABAN 10 MG/1
TABLET, FILM COATED ORAL
Qty: 90 TAB | Refills: 1 | Status: SHIPPED | OUTPATIENT
Start: 2019-11-11 | End: 2020-03-17 | Stop reason: SDUPTHER

## 2019-11-18 ENCOUNTER — OFFICE VISIT (OUTPATIENT)
Dept: HEMATOLOGY ONCOLOGY | Facility: MEDICAL CENTER | Age: 79
End: 2019-11-18
Payer: MEDICARE

## 2019-11-18 VITALS
TEMPERATURE: 97.5 F | RESPIRATION RATE: 18 BRPM | BODY MASS INDEX: 28.26 KG/M2 | DIASTOLIC BLOOD PRESSURE: 68 MMHG | HEIGHT: 65 IN | OXYGEN SATURATION: 99 % | HEART RATE: 104 BPM | SYSTOLIC BLOOD PRESSURE: 112 MMHG | WEIGHT: 169.64 LBS

## 2019-11-18 DIAGNOSIS — C78.7 SECONDARY MALIGNANT NEOPLASM OF LIVER (HCC): Chronic | ICD-10-CM

## 2019-11-18 DIAGNOSIS — C20 MALIGNANT NEOPLASM OF RECTUM (HCC): Chronic | ICD-10-CM

## 2019-11-18 DIAGNOSIS — R06.02 SOB (SHORTNESS OF BREATH): ICD-10-CM

## 2019-11-18 PROCEDURE — 99213 OFFICE O/P EST LOW 20 MIN: CPT | Performed by: NURSE PRACTITIONER

## 2019-11-18 RX ORDER — ONDANSETRON 2 MG/ML
4 INJECTION INTRAMUSCULAR; INTRAVENOUS
Status: CANCELLED | OUTPATIENT
Start: 2019-11-19

## 2019-11-18 RX ORDER — LIDOCAINE HYDROCHLORIDE 10 MG/ML
10 INJECTION, SOLUTION INFILTRATION; PERINEURAL ONCE
Status: CANCELLED | OUTPATIENT
Start: 2019-11-19

## 2019-11-18 RX ORDER — PROCHLORPERAZINE MALEATE 10 MG
10 TABLET ORAL EVERY 6 HOURS PRN
Status: CANCELLED | OUTPATIENT
Start: 2019-11-19

## 2019-11-18 RX ORDER — DEXTROSE MONOHYDRATE 50 MG/ML
INJECTION, SOLUTION INTRAVENOUS CONTINUOUS
Status: CANCELLED | OUTPATIENT
Start: 2019-11-19

## 2019-11-18 RX ORDER — ONDANSETRON 8 MG/1
8 TABLET, ORALLY DISINTEGRATING ORAL
Status: CANCELLED | OUTPATIENT
Start: 2019-11-19

## 2019-11-18 RX ORDER — DEXAMETHASONE SODIUM PHOSPHATE 4 MG/ML
8 INJECTION, SOLUTION INTRA-ARTICULAR; INTRALESIONAL; INTRAMUSCULAR; INTRAVENOUS; SOFT TISSUE ONCE
Status: CANCELLED | OUTPATIENT
Start: 2019-11-19

## 2019-11-18 ASSESSMENT — ENCOUNTER SYMPTOMS
DIARRHEA: 0
CONSTIPATION: 0
HEARTBURN: 1
MYALGIAS: 0
DIZZINESS: 0
HEADACHES: 0
FEVER: 0
WHEEZING: 0
WEIGHT LOSS: 0
NAUSEA: 0
PALPITATIONS: 0
SHORTNESS OF BREATH: 1
CHILLS: 0
VOMITING: 0
HEMOPTYSIS: 0
ROS SKIN COMMENTS: DRY SKIN
SPUTUM PRODUCTION: 0
TINGLING: 0
COUGH: 0

## 2019-11-18 ASSESSMENT — PAIN SCALES - GENERAL: PAINLEVEL: NO PAIN

## 2019-11-18 NOTE — PROGRESS NOTES
"Subjective:      Karlene Walters is a 79 y.o. female who presents for pre-Chemotherapy for metastatic rectal carcinoma.         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  She presents unaccompanied for today's visit.  Patient has been on maintenance single agent 5-FU and is scheduled for cycle #88 tomorrow.    Patient is feeling fairly stable.  She denies any fevers chills, fatigue or weight loss.  She does still have the shortness of breath which does appear to not be worsening.  She denies coughing, wheezing hemoptysis or sputum production.  She denies chest pain, high palpitations.  She was on Zantac for quite a while and has recently discontinued it.  She is taking Gas-X as needed and notes some heartburn every once in a while.  She also notes a little bit of nausea and vomiting which is very minimal.  She is having normal output via her ostomy and it does wax and wane based on her diet.  She is voiding without difficulty and denies any generalized pain.  She does have dry skin.  She denies dizziness, peripheral neuropathy or headaches.    Patient is scheduled for a follow-up CT scan which was ordered by surgeon, Dr. West.    Allergies   Allergen Reactions   • Oxaliplatin Anaphylaxis   • Codeine      \"gets drunk\"   • Pcn [Penicillins] Itching   • Sulfa Drugs Itching   • Tape Rash     PAPER TAPE OK     Current Outpatient Medications on File Prior to Visit   Medication Sig Dispense Refill   • XARELTO 10 MG Tab tablet TAKE 1 TABLET EVERY DAY 90 Tab 1   • potassium chloride (MICRO-K) 10 MEQ capsule   3   • furosemide (LASIX) 40 MG Tab Take 1 Tab by mouth every day. 90 Tab 3   • lidocaine-prilocaine (EMLA) 2.5-2.5 % Cream APPLY A THICK FILM OVER THE PORT ACCESS SITE PRIOR TO ACCESS 30 g 3   • ondansetron (ZOFRAN) 4 MG Tab tablet Take 1 Tab by mouth as needed. 30 Tab 3   • Tiotropium Bromide-Olodaterol (STIOLTO RESPIMAT) 2.5-2.5 MCG/ACT Aero Soln Take 2 Puffs by mouth " "every day. 1 Inhaler 11   • albuterol (PROAIR HFA) 108 (90 Base) MCG/ACT Aero Soln inhalation aerosol Inhale 2 Puffs by mouth every four hours as needed for Shortness of Breath (wheezing). 1 Inhaler 11   • metronidazole (METROCREAM) 0.75 % cream Apply 1 Each to affected area(s) 2 times a day.     • loperamide (IMODIUM) 2 MG Cap Take 2 mg by mouth 4 times a day as needed for Diarrhea.     • cyanocobalamin (VITAMIN B-12) 500 MCG Tab Take 1,000 mcg by mouth every day.     • Multiple Vitamins-Minerals (CENTRUM SILVER PO) Take 1 Tab by mouth every day.     • Cholecalciferol (VITAMIN D3) 400 UNITS CAPS Take 1 Cap by mouth every day.     • loratadine (CLARITIN) 10 MG TABS Take 10 mg by mouth every day. Indications: Hayfever       No current facility-administered medications on file prior to visit.          Review of Systems   Constitutional: Negative for chills, fever, malaise/fatigue and weight loss.   Respiratory: Positive for shortness of breath. Negative for cough, hemoptysis, sputum production and wheezing.    Cardiovascular: Negative for chest pain and palpitations.   Gastrointestinal: Positive for heartburn (minimal but controlled with gas-ex at times). Negative for constipation, diarrhea, nausea and vomiting.        Every once in a while she will note some nausea and vomiting   Genitourinary: Negative for dysuria.   Musculoskeletal: Negative for myalgias.   Skin:        Dry skin   Neurological: Negative for dizziness, tingling and headaches.          Objective:     /68 (BP Location: Right arm, Patient Position: Sitting, BP Cuff Size: Adult)   Pulse (!) 104   Temp 36.4 °C (97.5 °F) (Temporal)   Resp 18   Ht 1.65 m (5' 4.96\")   Wt 76.9 kg (169 lb 10.3 oz)   LMP  (LMP Unknown)   SpO2 99%   BMI 28.26 kg/m²      Physical Exam  Constitutional:       General: She is not in acute distress.     Appearance: Normal appearance. She is not diaphoretic.   HENT:      Head: Normocephalic and atraumatic.      " Mouth/Throat:      Mouth: Mucous membranes are moist.      Pharynx: Oropharynx is clear. No oropharyngeal exudate or posterior oropharyngeal erythema.   Cardiovascular:      Rate and Rhythm: Normal rate and regular rhythm.      Pulses: Normal pulses.      Heart sounds: Normal heart sounds. No murmur. No friction rub. No gallop.    Pulmonary:      Effort: Pulmonary effort is normal. No respiratory distress.      Breath sounds: Normal breath sounds. No wheezing.   Abdominal:      General: Bowel sounds are normal. There is no distension.      Palpations: Abdomen is soft.      Tenderness: There is no tenderness.   Musculoskeletal: Normal range of motion.         General: No swelling or tenderness.   Skin:     General: Skin is warm and dry.   Neurological:      Mental Status: She is alert and oriented to person, place, and time.   Psychiatric:         Mood and Affect: Mood normal.         Behavior: Behavior normal.            Assessment/Plan:       1. Malignant neoplasm of rectum (HCC)     2. Secondary malignant neoplasm of liver (HCC)         Plan  1.  Patient with metastatic rectal carcinoma to the liver currently on single agent maintenance 5-FU.  She is scheduled to receive cycle #88 tomorrow.  Clinically she is doing well and stable.  If her labs meet parameters patient is okay to proceed with treatment as planned.    2.  Patient with continued shortness of breath that appears to be stable.  She has had significant amount of work-up including both pulmonary and cardiology.  We will continue to monitor patient.    3.  Discussed with Dr. Steele and at this time she is scheduled to have a CT scan at the end of December.  Will wait and see how the CT looks and if patient continues to have good control of the disease may consider increasing length of time in between infusions while continue on some maintenance therapy.  We will wait and see how her scan looks in December and she will follow-up with Dr. Steele after that  for further discussion.    Patient is to continue to follow-up in the clinic every 4 weeks and will continue with treatment every 2 weeks as scheduled.

## 2019-11-19 ENCOUNTER — DOCUMENTATION (OUTPATIENT)
Dept: NUTRITION | Facility: MEDICAL CENTER | Age: 79
End: 2019-11-19

## 2019-11-19 ENCOUNTER — OUTPATIENT INFUSION SERVICES (OUTPATIENT)
Dept: ONCOLOGY | Facility: MEDICAL CENTER | Age: 79
End: 2019-11-19
Attending: INTERNAL MEDICINE
Payer: MEDICARE

## 2019-11-19 VITALS
OXYGEN SATURATION: 98 % | TEMPERATURE: 98 F | BODY MASS INDEX: 28.28 KG/M2 | WEIGHT: 169.75 LBS | HEART RATE: 101 BPM | HEIGHT: 65 IN | DIASTOLIC BLOOD PRESSURE: 69 MMHG | RESPIRATION RATE: 18 BRPM | SYSTOLIC BLOOD PRESSURE: 134 MMHG

## 2019-11-19 DIAGNOSIS — C20 MALIGNANT NEOPLASM OF RECTUM (HCC): ICD-10-CM

## 2019-11-19 LAB
ALBUMIN SERPL BCP-MCNC: 4 G/DL (ref 3.2–4.9)
ALBUMIN/GLOB SERPL: 1.6 G/DL
ALP SERPL-CCNC: 130 U/L (ref 30–99)
ALT SERPL-CCNC: 21 U/L (ref 2–50)
ANION GAP SERPL CALC-SCNC: 11 MMOL/L (ref 0–11.9)
ANISOCYTOSIS BLD QL SMEAR: ABNORMAL
AST SERPL-CCNC: 25 U/L (ref 12–45)
BASOPHILS # BLD AUTO: 1.7 % (ref 0–1.8)
BASOPHILS # BLD: 0.08 K/UL (ref 0–0.12)
BILIRUB SERPL-MCNC: 0.7 MG/DL (ref 0.1–1.5)
BUN SERPL-MCNC: 16 MG/DL (ref 8–22)
BURR CELLS BLD QL SMEAR: NORMAL
CALCIUM SERPL-MCNC: 9.4 MG/DL (ref 8.5–10.5)
CEA SERPL-MCNC: 1.1 NG/ML (ref 0–3)
CHLORIDE SERPL-SCNC: 108 MMOL/L (ref 96–112)
CO2 SERPL-SCNC: 20 MMOL/L (ref 20–33)
CREAT SERPL-MCNC: 1.16 MG/DL (ref 0.5–1.4)
EOSINOPHIL # BLD AUTO: 0.12 K/UL (ref 0–0.51)
EOSINOPHIL NFR BLD: 2.6 % (ref 0–6.9)
ERYTHROCYTE [DISTWIDTH] IN BLOOD BY AUTOMATED COUNT: 65.6 FL (ref 35.9–50)
GLOBULIN SER CALC-MCNC: 2.5 G/DL (ref 1.9–3.5)
GLUCOSE SERPL-MCNC: 99 MG/DL (ref 65–99)
HCT VFR BLD AUTO: 39.2 % (ref 37–47)
HGB BLD-MCNC: 12.3 G/DL (ref 12–16)
LYMPHOCYTES # BLD AUTO: 1.17 K/UL (ref 1–4.8)
LYMPHOCYTES NFR BLD: 24.4 % (ref 22–41)
MACROCYTES BLD QL SMEAR: ABNORMAL
MANUAL DIFF BLD: NORMAL
MCH RBC QN AUTO: 28 PG (ref 27–33)
MCHC RBC AUTO-ENTMCNC: 31.4 G/DL (ref 33.6–35)
MCV RBC AUTO: 89.1 FL (ref 81.4–97.8)
MICROCYTES BLD QL SMEAR: ABNORMAL
MONOCYTES # BLD AUTO: 0.62 K/UL (ref 0–0.85)
MONOCYTES NFR BLD AUTO: 13 % (ref 0–13.4)
MORPHOLOGY BLD-IMP: NORMAL
NEUTROPHILS # BLD AUTO: 2.8 K/UL (ref 2–7.15)
NEUTROPHILS NFR BLD: 58.3 % (ref 44–72)
NRBC # BLD AUTO: 0 K/UL
NRBC BLD-RTO: 0 /100 WBC
OVALOCYTES BLD QL SMEAR: NORMAL
PLATELET # BLD AUTO: 218 K/UL (ref 164–446)
PLATELET BLD QL SMEAR: NORMAL
PMV BLD AUTO: 9.7 FL (ref 9–12.9)
POIKILOCYTOSIS BLD QL SMEAR: NORMAL
POLYCHROMASIA BLD QL SMEAR: NORMAL
POTASSIUM SERPL-SCNC: 4.3 MMOL/L (ref 3.6–5.5)
PROT SERPL-MCNC: 6.5 G/DL (ref 6–8.2)
RBC # BLD AUTO: 4.4 M/UL (ref 4.2–5.4)
RBC BLD AUTO: PRESENT
SMUDGE CELLS BLD QL SMEAR: NORMAL
SODIUM SERPL-SCNC: 139 MMOL/L (ref 135–145)
VARIANT LYMPHS BLD QL SMEAR: NORMAL
WBC # BLD AUTO: 4.8 K/UL (ref 4.8–10.8)

## 2019-11-19 PROCEDURE — 85007 BL SMEAR W/DIFF WBC COUNT: CPT

## 2019-11-19 PROCEDURE — A4212 NON CORING NEEDLE OR STYLET: HCPCS

## 2019-11-19 PROCEDURE — 700111 HCHG RX REV CODE 636 W/ 250 OVERRIDE (IP): Performed by: NURSE PRACTITIONER

## 2019-11-19 PROCEDURE — 80053 COMPREHEN METABOLIC PANEL: CPT

## 2019-11-19 PROCEDURE — 85027 COMPLETE CBC AUTOMATED: CPT

## 2019-11-19 PROCEDURE — 96375 TX/PRO/DX INJ NEW DRUG ADDON: CPT

## 2019-11-19 PROCEDURE — G0498 CHEMO EXTEND IV INFUS W/PUMP: HCPCS

## 2019-11-19 PROCEDURE — 82378 CARCINOEMBRYONIC ANTIGEN: CPT

## 2019-11-19 PROCEDURE — 96374 THER/PROPH/DIAG INJ IV PUSH: CPT | Mod: XU

## 2019-11-19 RX ORDER — DEXAMETHASONE SODIUM PHOSPHATE 4 MG/ML
8 INJECTION, SOLUTION INTRA-ARTICULAR; INTRALESIONAL; INTRAMUSCULAR; INTRAVENOUS; SOFT TISSUE ONCE
Status: COMPLETED | OUTPATIENT
Start: 2019-11-19 | End: 2019-11-19

## 2019-11-19 RX ADMIN — FLUOROURACIL 3610 MG: 50 INJECTION, SOLUTION INTRAVENOUS at 10:16

## 2019-11-19 RX ADMIN — DEXAMETHASONE SODIUM PHOSPHATE 8 MG: 4 INJECTION, SOLUTION INTRA-ARTICULAR; INTRALESIONAL; INTRAMUSCULAR; INTRAVENOUS; SOFT TISSUE at 10:03

## 2019-11-19 NOTE — PROGRESS NOTES
Nutrition Services: Brief Update  Weight: 169 lbs, weighed today in Infusion   Previous weight: 168 lbs on 11/4    Weight Change: Pt maintained weight within > 1 month.     Pt weight remains stable, no RD intervention indicated at this time. RD to follow-up at next appointment as indicated.     RD to continue to monitor throughout treatment.  Please contact [phone]

## 2019-11-19 NOTE — PROGRESS NOTES
Pt ambulatory to \[facility]\"" for C88 of 5FU for CRC.  Pt w/ no s/s of infection, pt has no complaints at this time.  Pt PORT accessed using sterile technique w/ low profile needle, brisk blood return noted, labs drawn per orders, flushed per protocol.  Pt lab results w/n parameters for tx today.  Pt pre-med administered w/ no adverse reactions.  CADD pump connected to pt's PORT, all clamps opened, pump confirmed to be in RUN mode, placed in pt's konrad pack w/ extra batteries.  Pt left on foot in NAD.  Confirmed pt's disconnect appt.

## 2019-11-19 NOTE — PROGRESS NOTES
"Pharmacy Chemotherapy Verification    Patient Name: Karlene Walters   Dx: Colon CA        Cycle 96 ([place] cycle 88)    Previous treatment: 11/5/19    Protocol: 5-FU     IV over 2 hours on Day 1, followed by    IVP on Day 1, followed by                               -- 10/31/17: delete Leucovorin and 5-FU push d/t neutropenia per Dr. Hardy   Fluorouracil  continuous infusion over 46-48 hours                              -- 20% reduction (1920 mg/m²) to be continued starting C28 per C Alsop APRN   14 day cycles for 12 cycles (adjuvant) or until disease progression or unacceptable toxicity  NCCN Guidelines for Colon Cancer. V.2.2015.  Jay T, et al. Eur J Cancer.1999;35(9):1343-7.      Allergies: Codeine; Oxaliplatin; Pcn; Sulfa drugs; and Tape     /69   Pulse (!) 101   Temp 36.7 °C (98 °F) (Temporal)   Resp 18   Ht 1.655 m (5' 5.16\")   Wt 77 kg (169 lb 12.1 oz)   LMP  (LMP Unknown)   SpO2 98%   BMI 28.11 kg/m²  Body surface area is 1.88 meters squared.    All lab results 11/19/19 within treatment parameters.       Fluorouracil (5-FU) 1920 mg/m² x 1.88m² = 3609.6mg              <10% difference, OK to treat with final dose = 3610mg    CIVI via CADD pump @ 1.6mL/hr over 46 hrs    DARIN Arroyo PharmAngelaD.                      "

## 2019-11-19 NOTE — PROGRESS NOTES
"Pharmacy Chemotherapy Verification:    Patient Name: Karlene Walters  Dx: Colon CA        Protocol: 5-FU + Leucovorin      IV over 2 hours on Day 1, followed by    IVP on Day 1, followed by         -- 10/31/17: delete Leucovorin and 5-FU push d/t neutropenia per Dr. Hardy   Fluorouracil   IV continuous infusion daily on Days 1 and 2 ( IV over 46-48 hours)        -- 20% reduction (1920 mg/m²) to be continued starting C28 per C Alsop APRN   Q14 day cycles for 12 cycles (adjuvant) or until disease progression or unacceptable toxicity  NCCN Guidelines for Colon Cancer. V.2.2015.  Jay T, et al. Eur J Cancer.1999;35(9):1343-7.      Allergies:  Codeine; Oxaliplatin; Pcn; Sulfa drugs; and Tape      /69   Pulse (!) 101   Temp 36.7 °C (98 °F) (Temporal)   Resp 18   Ht 1.655 m (5' 5.16\")   Wt 77 kg (169 lb 12.1 oz)   LMP  (LMP Unknown)   SpO2 98%   BMI 28.11 kg/m²  Body surface area is 1.88 meters squared.    Labs 11/19/19:  ANC~ 280 Plt = 218k   Hgb = 12.3     SCr = 1.16mg/dL CrCl ~ 47.3 mL/min   LFT's = WNL TBili = 0.7       Drug Order   (Drug name, dose, route, IV Fluid & volume, frequency, number of doses) Cycle 96 (C87 in [place])   Previous treatment: C95 on 11/5/19   Medication = Fluorouracil (5-FU)   Base Dose = 1920 mg/m²  Calc Dose:Base Dose x 1.88 m² = 3609.6mg  Final Dose = 3610 mg   Route = CIVI  Drug Conc = 50 mg/mL  Fluid & Volume = 72.2 mL (+3 mL overfill = 75.2 mL)  Admin Duration = CIVI over 46 hrs @ 1.6 mL/hr   Via CADD pump for home infusion        <10% difference, OK to treat with final dose     By my signature below, I confirm this process was performed independently with the BSA and all final chemotherapy dosing calculations congruent. I have reviewed the above chemotherapy order and that my calculation of the final dose and BSA (when applicable) corroborate those calculations of the  pharmacist.     Asha Finch, PharmD      "

## 2019-11-19 NOTE — PROGRESS NOTES
Chemotherapy Verification - PRIMARY RN      Height = 1.655 m  Weight = 77 kg  BSA = 1.88 m2       Medication: fluorouracil  CADD pump Dose: 1920 mg/m2  Calculated Dose: 3609.6 mg                             (In mg/m2, AUC, mg/kg)         I confirm this process was performed independently with the BSA and all final chemotherapy dosing calculations congruent.  Any discrepancies of 10% or greater have been addressed with the chemotherapy pharmacist. The resolution of the discrepancy has been documented in the EPIC progress notes.

## 2019-11-21 ENCOUNTER — OUTPATIENT INFUSION SERVICES (OUTPATIENT)
Dept: ONCOLOGY | Facility: MEDICAL CENTER | Age: 79
End: 2019-11-21
Attending: INTERNAL MEDICINE
Payer: MEDICARE

## 2019-11-21 VITALS
WEIGHT: 171.96 LBS | TEMPERATURE: 98.4 F | DIASTOLIC BLOOD PRESSURE: 58 MMHG | SYSTOLIC BLOOD PRESSURE: 112 MMHG | RESPIRATION RATE: 18 BRPM | HEART RATE: 82 BPM | BODY MASS INDEX: 28.65 KG/M2 | HEIGHT: 65 IN | OXYGEN SATURATION: 100 %

## 2019-11-21 PROCEDURE — 96523 IRRIG DRUG DELIVERY DEVICE: CPT

## 2019-11-21 PROCEDURE — 700111 HCHG RX REV CODE 636 W/ 250 OVERRIDE (IP)

## 2019-11-21 RX ADMIN — HEPARIN 500 UNITS: 100 SYRINGE at 11:34

## 2019-11-21 NOTE — PROGRESS NOTES
Here for 5 FU pump disconnect after 46 hrs of continuous infusion. See chemotherapy flow sheet for volume / dose administration. Port flushed per protocol, site d-accessed, needle intact compression dressing applied. Patient discharge home with care giver via wheelchair in [facility]. Next appointment confirmed.

## 2019-12-03 ENCOUNTER — OUTPATIENT INFUSION SERVICES (OUTPATIENT)
Dept: ONCOLOGY | Facility: MEDICAL CENTER | Age: 79
End: 2019-12-03
Attending: INTERNAL MEDICINE
Payer: MEDICARE

## 2019-12-03 VITALS
OXYGEN SATURATION: 99 % | DIASTOLIC BLOOD PRESSURE: 57 MMHG | SYSTOLIC BLOOD PRESSURE: 133 MMHG | HEIGHT: 65 IN | RESPIRATION RATE: 18 BRPM | WEIGHT: 171.96 LBS | HEART RATE: 117 BPM | BODY MASS INDEX: 28.65 KG/M2 | TEMPERATURE: 97.4 F

## 2019-12-03 DIAGNOSIS — R94.4 DECREASED GFR: ICD-10-CM

## 2019-12-03 DIAGNOSIS — C20 MALIGNANT NEOPLASM OF RECTUM (HCC): ICD-10-CM

## 2019-12-03 LAB
ALBUMIN SERPL BCP-MCNC: 3.9 G/DL (ref 3.2–4.9)
ALBUMIN/GLOB SERPL: 1.6 G/DL
ALP SERPL-CCNC: 126 U/L (ref 30–99)
ALT SERPL-CCNC: 21 U/L (ref 2–50)
ANION GAP SERPL CALC-SCNC: 12 MMOL/L (ref 0–11.9)
ANISOCYTOSIS BLD QL SMEAR: ABNORMAL
AST SERPL-CCNC: 28 U/L (ref 12–45)
BASOPHILS # BLD AUTO: 0.6 % (ref 0–1.8)
BASOPHILS # BLD: 0.03 K/UL (ref 0–0.12)
BILIRUB SERPL-MCNC: 0.5 MG/DL (ref 0.1–1.5)
BUN SERPL-MCNC: 14 MG/DL (ref 8–22)
CALCIUM SERPL-MCNC: 9.3 MG/DL (ref 8.5–10.5)
CEA SERPL-MCNC: 1.1 NG/ML (ref 0–3)
CHLORIDE SERPL-SCNC: 107 MMOL/L (ref 96–112)
CO2 SERPL-SCNC: 22 MMOL/L (ref 20–33)
COMMENT 1642: NORMAL
CREAT SERPL-MCNC: 1.03 MG/DL (ref 0.5–1.4)
EOSINOPHIL # BLD AUTO: 0.2 K/UL (ref 0–0.51)
EOSINOPHIL NFR BLD: 4.1 % (ref 0–6.9)
ERYTHROCYTE [DISTWIDTH] IN BLOOD BY AUTOMATED COUNT: 67.1 FL (ref 35.9–50)
GLOBULIN SER CALC-MCNC: 2.5 G/DL (ref 1.9–3.5)
GLUCOSE SERPL-MCNC: 130 MG/DL (ref 65–99)
HCT VFR BLD AUTO: 38.4 % (ref 37–47)
HGB BLD-MCNC: 11.9 G/DL (ref 12–16)
IMM GRANULOCYTES # BLD AUTO: 0.02 K/UL (ref 0–0.11)
IMM GRANULOCYTES NFR BLD AUTO: 0.4 % (ref 0–0.9)
LYMPHOCYTES # BLD AUTO: 1.01 K/UL (ref 1–4.8)
LYMPHOCYTES NFR BLD: 20.9 % (ref 22–41)
MCH RBC QN AUTO: 27.6 PG (ref 27–33)
MCHC RBC AUTO-ENTMCNC: 31 G/DL (ref 33.6–35)
MCV RBC AUTO: 89.1 FL (ref 81.4–97.8)
MONOCYTES # BLD AUTO: 0.54 K/UL (ref 0–0.85)
MONOCYTES NFR BLD AUTO: 11.2 % (ref 0–13.4)
MORPHOLOGY BLD-IMP: NORMAL
NEUTROPHILS # BLD AUTO: 3.04 K/UL (ref 2–7.15)
NEUTROPHILS NFR BLD: 62.8 % (ref 44–72)
NRBC # BLD AUTO: 0 K/UL
NRBC BLD-RTO: 0 /100 WBC
OVALOCYTES BLD QL SMEAR: NORMAL
PLATELET # BLD AUTO: 220 K/UL (ref 164–446)
PLATELET BLD QL SMEAR: NORMAL
PMV BLD AUTO: 10.3 FL (ref 9–12.9)
POIKILOCYTOSIS BLD QL SMEAR: NORMAL
POTASSIUM SERPL-SCNC: 3.3 MMOL/L (ref 3.6–5.5)
PROT SERPL-MCNC: 6.4 G/DL (ref 6–8.2)
RBC # BLD AUTO: 4.31 M/UL (ref 4.2–5.4)
RBC BLD AUTO: PRESENT
SODIUM SERPL-SCNC: 141 MMOL/L (ref 135–145)
WBC # BLD AUTO: 4.8 K/UL (ref 4.8–10.8)

## 2019-12-03 PROCEDURE — A4212 NON CORING NEEDLE OR STYLET: HCPCS

## 2019-12-03 PROCEDURE — 96374 THER/PROPH/DIAG INJ IV PUSH: CPT | Mod: XU

## 2019-12-03 PROCEDURE — 82378 CARCINOEMBRYONIC ANTIGEN: CPT

## 2019-12-03 PROCEDURE — 85025 COMPLETE CBC W/AUTO DIFF WBC: CPT

## 2019-12-03 PROCEDURE — G0498 CHEMO EXTEND IV INFUS W/PUMP: HCPCS

## 2019-12-03 PROCEDURE — 700111 HCHG RX REV CODE 636 W/ 250 OVERRIDE (IP): Performed by: NURSE PRACTITIONER

## 2019-12-03 PROCEDURE — 80053 COMPREHEN METABOLIC PANEL: CPT

## 2019-12-03 RX ORDER — DEXTROSE MONOHYDRATE 50 MG/ML
INJECTION, SOLUTION INTRAVENOUS CONTINUOUS
Status: CANCELLED | OUTPATIENT
Start: 2019-12-03

## 2019-12-03 RX ORDER — DEXAMETHASONE SODIUM PHOSPHATE 4 MG/ML
8 INJECTION, SOLUTION INTRA-ARTICULAR; INTRALESIONAL; INTRAMUSCULAR; INTRAVENOUS; SOFT TISSUE ONCE
Status: CANCELLED | OUTPATIENT
Start: 2019-12-03

## 2019-12-03 RX ORDER — ONDANSETRON 8 MG/1
8 TABLET, ORALLY DISINTEGRATING ORAL
Status: CANCELLED | OUTPATIENT
Start: 2019-12-03

## 2019-12-03 RX ORDER — POTASSIUM CHLORIDE 20 MEQ/1
40 TABLET, EXTENDED RELEASE ORAL ONCE
Status: DISCONTINUED | OUTPATIENT
Start: 2019-12-03 | End: 2019-12-03 | Stop reason: HOSPADM

## 2019-12-03 RX ORDER — DEXAMETHASONE SODIUM PHOSPHATE 4 MG/ML
8 INJECTION, SOLUTION INTRA-ARTICULAR; INTRALESIONAL; INTRAMUSCULAR; INTRAVENOUS; SOFT TISSUE ONCE
Status: COMPLETED | OUTPATIENT
Start: 2019-12-03 | End: 2019-12-03

## 2019-12-03 RX ORDER — ONDANSETRON 2 MG/ML
4 INJECTION INTRAMUSCULAR; INTRAVENOUS
Status: CANCELLED | OUTPATIENT
Start: 2019-12-03

## 2019-12-03 RX ORDER — PROCHLORPERAZINE MALEATE 10 MG
10 TABLET ORAL EVERY 6 HOURS PRN
Status: CANCELLED | OUTPATIENT
Start: 2019-12-03

## 2019-12-03 RX ORDER — LIDOCAINE HYDROCHLORIDE 10 MG/ML
10 INJECTION, SOLUTION INFILTRATION; PERINEURAL ONCE
Status: CANCELLED | OUTPATIENT
Start: 2019-12-03

## 2019-12-03 RX ADMIN — FLUOROURACIL 3630 MG: 50 INJECTION, SOLUTION INTRAVENOUS at 11:39

## 2019-12-03 RX ADMIN — DEXAMETHASONE SODIUM PHOSPHATE 8 MG: 4 INJECTION, SOLUTION INTRA-ARTICULAR; INTRALESIONAL; INTRAMUSCULAR; INTRAVENOUS; SOFT TISSUE at 10:29

## 2019-12-03 NOTE — PROGRESS NOTES
"Pharmacy Chemotherapy Verification Note:    Patient Name: Karlene Walters      Dx: Colon Ca      Protocol: 5-FU +        *Dosing Reference*                                    -- 10/31/17: delete Leucovorin and 5-FU push d/t neutropenia per Dr. Hardy   Fluorouracil 2400 mg/m² continuous infusion over 46-48 hours                              -- 20% reduction (1920 mg/m²) to be continued starting C28 per C Alsop APRN   14 day cycles for 12 cycles (adjuvant) or until disease progression or unacceptable toxicity    NCCN Guidelines for Colon Cancer. V.2.2015.  Jay T, et al. Eur J Cancer.1999;35(9):1343-7.     Allergies:  Oxaliplatin; Codeine; Pcn [penicillins]; Sulfa drugs; and Tape     /57   Pulse (!) 117   Temp 36.3 °C (97.4 °F) (Temporal)   Resp 18   Ht 1.657 m (5' 5.25\")   Wt 78 kg (171 lb 15.3 oz)   LMP  (LMP Unknown)   SpO2 99%   BMI 28.40 kg/m²  Body surface area is 1.89 meters squared.  ANC~ 3040  Plt = 220 k  SCr = 1.03 mg/dL  CrCl = 54 mL/min  LFT = WNL  TBili = 0.5     Drug Order   (Drug name, dose, route, IV Fluid & volume, frequency, number of doses) Cycle: 97 ([place] cycle 89)      Previous treatment: 11/19/19     Medication = Fluorouracil (5-FU)  Base Dose = 1920 mg/m²  Calc Dose: Base Dose x 1.89 m² = 3629 mg  Final Dose = 3630 mg  Route = CIVI via CADD pump  Fluid & Volume = 72.6 mL (+ 3mL overfill = 75.6 mL)  Admin Duration = Over 46 hours (to run at 1.6 mL/hour)   via CADD pump       <10% difference, ok to treat with final written dose     By my signature below, I confirm this process was performed independently with the BSA and all final chemotherapy dosing calculations congruent. I have reviewed the above chemotherapy order and that my calculation of the final dose and BSA (when applicable) corroborate those calculations of the  pharmacist.     Rudy Arthur, PharmD, BCPS    "

## 2019-12-03 NOTE — PROGRESS NOTES
Chemotherapy Verification - PRIMARY RN      Height = 1.657 m  Weight = 78 kg  BSA = 1.89 m^2       Medication: fluorouracil  Dose: 1920 mg/m^2  Calculated Dose: 3628.8 mg                             (In mg/m2, AUC, mg/kg)       I confirm this process was performed independently with the BSA and all final chemotherapy dosing calculations congruent.  Any discrepancies of 10% or greater have been addressed with the chemotherapy pharmacist. The resolution of the discrepancy has been documented in the EPIC progress notes.

## 2019-12-03 NOTE — PROGRESS NOTES
Karlene arrives to infusion services for day 1/cycle 89 of 5FU pump hook-up today.  Karlene reports tolerating previous cycle with minimal side effects. Patient has emla to port in right upper chest.  Port site cleaned and accessed in sterile fashion with 20 gauge, 1 inch low profile hernandez needle. Labs drawn and within parameters to treat. Maria M MORALES notified of potassium and ordered to replenish per protocol. Patient used home supply to replenish potassium. 5-FU infusing in home pump as ordered. Patient discharged home to self-care, next appointment scheduled for pump de-access.

## 2019-12-03 NOTE — PROGRESS NOTES
Chemotherapy Verification - SECONDARY RN       Height = 165.7 cm  Weight = 78 kg  BSA = 1.89 m^2       Medication: Fluorouracil  Dose: 1920 mg/m^2  Calculated Dose: 3628.8 mg                             (In mg/m2, AUC, mg/kg)     I confirm that this process was performed independently.

## 2019-12-03 NOTE — PROGRESS NOTES
"Pharmacy Chemotherapy Verification    Patient Name: Karlene Walters   Dx: Colon CA        Cycle 97 ([place] cycle 89)    Previous treatment: 11/5/19    Protocol: 5-FU     IV over 2 hours on Day 1, followed by    IVP on Day 1, followed by                               -- 10/31/17: delete Leucovorin and 5-FU push d/t neutropenia per Dr. Hardy   Fluorouracil  continuous infusion over 46-48 hours                              -- 20% reduction (1920 mg/m²) to be continued starting C28 per C Alsop APRN   14 day cycles for 12 cycles (adjuvant) or until disease progression or unacceptable toxicity  NCCN Guidelines for Colon Cancer. V.2.2015.  Jay T, et al. Eur J Cancer.1999;35(9):1343-7.      Allergies: Codeine; Oxaliplatin; Pcn; Sulfa drugs; and Tape     /57   Pulse (!) 117   Temp 36.3 °C (97.4 °F) (Temporal)   Resp 18   Ht 1.657 m (5' 5.25\")   Wt 78 kg (171 lb 15.3 oz)   LMP  (LMP Unknown)   SpO2 99%   BMI 28.40 kg/m²  Body surface area is 1.89 meters squared.    All lab results 12/3/19 within treatment parameters. Potassium replaced.      Fluorouracil (5-FU) 1920 mg/m² x 1.89m² = 3629mg              <10% difference, OK to treat with final dose = 3630mg    CIVI via CADD pump @ 1.6mL/hr over 46 hrs    DARIN Arroyo Pharm.D.                      "

## 2019-12-05 ENCOUNTER — OUTPATIENT INFUSION SERVICES (OUTPATIENT)
Dept: ONCOLOGY | Facility: MEDICAL CENTER | Age: 79
End: 2019-12-05
Attending: INTERNAL MEDICINE
Payer: MEDICARE

## 2019-12-05 VITALS
HEART RATE: 92 BPM | RESPIRATION RATE: 18 BRPM | HEIGHT: 65 IN | DIASTOLIC BLOOD PRESSURE: 61 MMHG | BODY MASS INDEX: 29.02 KG/M2 | OXYGEN SATURATION: 99 % | TEMPERATURE: 97 F | WEIGHT: 174.16 LBS | SYSTOLIC BLOOD PRESSURE: 119 MMHG

## 2019-12-05 DIAGNOSIS — C20 MALIGNANT NEOPLASM OF RECTUM (HCC): ICD-10-CM

## 2019-12-05 PROCEDURE — 96523 IRRIG DRUG DELIVERY DEVICE: CPT

## 2019-12-05 PROCEDURE — 700111 HCHG RX REV CODE 636 W/ 250 OVERRIDE (IP): Performed by: NURSE PRACTITIONER

## 2019-12-05 RX ADMIN — HEPARIN 500 UNITS: 100 SYRINGE at 11:17

## 2019-12-05 NOTE — PROGRESS NOTES
Pt returns to [facility] for 5FU CADD pump de-access.  Pt reports some nausea this morning.  No other complaints.  CADD pump volume is complete.  Port flushed with NS and heparin per protocol.  Blood return observed during port flush.  Meade needle removed intact.  Site covered with gauze.  Confirmed next appt with patient.  Pt dc home in stable condition.

## 2019-12-12 RX ORDER — DEXTROSE MONOHYDRATE 50 MG/ML
INJECTION, SOLUTION INTRAVENOUS CONTINUOUS
Status: CANCELLED | OUTPATIENT
Start: 2019-12-17

## 2019-12-12 RX ORDER — DEXAMETHASONE SODIUM PHOSPHATE 4 MG/ML
8 INJECTION, SOLUTION INTRA-ARTICULAR; INTRALESIONAL; INTRAMUSCULAR; INTRAVENOUS; SOFT TISSUE ONCE
Status: CANCELLED | OUTPATIENT
Start: 2019-12-17

## 2019-12-12 RX ORDER — LIDOCAINE HYDROCHLORIDE 10 MG/ML
10 INJECTION, SOLUTION INFILTRATION; PERINEURAL ONCE
Status: CANCELLED | OUTPATIENT
Start: 2019-12-17

## 2019-12-12 RX ORDER — ONDANSETRON 2 MG/ML
4 INJECTION INTRAMUSCULAR; INTRAVENOUS
Status: CANCELLED | OUTPATIENT
Start: 2019-12-17

## 2019-12-12 RX ORDER — ONDANSETRON 8 MG/1
8 TABLET, ORALLY DISINTEGRATING ORAL
Status: CANCELLED | OUTPATIENT
Start: 2019-12-17

## 2019-12-12 RX ORDER — PROCHLORPERAZINE MALEATE 10 MG
10 TABLET ORAL EVERY 6 HOURS PRN
Status: CANCELLED | OUTPATIENT
Start: 2019-12-17

## 2019-12-16 DIAGNOSIS — J43.9 PULMONARY EMPHYSEMA, UNSPECIFIED EMPHYSEMA TYPE (HCC): ICD-10-CM

## 2019-12-16 RX ORDER — ALBUTEROL SULFATE 90 UG/1
2 AEROSOL, METERED RESPIRATORY (INHALATION) EVERY 4 HOURS PRN
Qty: 1 INHALER | Refills: 11 | Status: ON HOLD
Start: 2019-12-16 | End: 2020-05-28

## 2019-12-17 ENCOUNTER — OUTPATIENT INFUSION SERVICES (OUTPATIENT)
Dept: ONCOLOGY | Facility: MEDICAL CENTER | Age: 79
End: 2019-12-17
Attending: INTERNAL MEDICINE
Payer: MEDICARE

## 2019-12-17 ENCOUNTER — OFFICE VISIT (OUTPATIENT)
Dept: CARDIOLOGY | Facility: MEDICAL CENTER | Age: 79
End: 2019-12-17
Payer: MEDICARE

## 2019-12-17 ENCOUNTER — OFFICE VISIT (OUTPATIENT)
Dept: HEMATOLOGY ONCOLOGY | Facility: MEDICAL CENTER | Age: 79
End: 2019-12-17
Payer: MEDICARE

## 2019-12-17 VITALS
OXYGEN SATURATION: 97 % | BODY MASS INDEX: 28.19 KG/M2 | WEIGHT: 169.2 LBS | RESPIRATION RATE: 18 BRPM | SYSTOLIC BLOOD PRESSURE: 114 MMHG | HEART RATE: 96 BPM | TEMPERATURE: 98.6 F | DIASTOLIC BLOOD PRESSURE: 62 MMHG | HEIGHT: 65 IN

## 2019-12-17 VITALS
WEIGHT: 169.31 LBS | SYSTOLIC BLOOD PRESSURE: 127 MMHG | OXYGEN SATURATION: 100 % | DIASTOLIC BLOOD PRESSURE: 60 MMHG | HEIGHT: 65 IN | RESPIRATION RATE: 18 BRPM | BODY MASS INDEX: 28.21 KG/M2 | TEMPERATURE: 97 F | HEART RATE: 100 BPM

## 2019-12-17 VITALS
HEART RATE: 100 BPM | DIASTOLIC BLOOD PRESSURE: 60 MMHG | OXYGEN SATURATION: 100 % | RESPIRATION RATE: 18 BRPM | BODY MASS INDEX: 28.21 KG/M2 | TEMPERATURE: 97 F | SYSTOLIC BLOOD PRESSURE: 127 MMHG | HEIGHT: 65 IN | WEIGHT: 169.31 LBS

## 2019-12-17 VITALS
HEIGHT: 65 IN | HEART RATE: 76 BPM | SYSTOLIC BLOOD PRESSURE: 120 MMHG | BODY MASS INDEX: 29.16 KG/M2 | DIASTOLIC BLOOD PRESSURE: 72 MMHG | OXYGEN SATURATION: 98 % | WEIGHT: 175 LBS

## 2019-12-17 DIAGNOSIS — R94.4 DECREASED GFR: ICD-10-CM

## 2019-12-17 DIAGNOSIS — R06.02 SHORTNESS OF BREATH: ICD-10-CM

## 2019-12-17 DIAGNOSIS — E78.5 HYPERLIPIDEMIA, UNSPECIFIED HYPERLIPIDEMIA TYPE: ICD-10-CM

## 2019-12-17 DIAGNOSIS — N28.9 RENAL INSUFFICIENCY: ICD-10-CM

## 2019-12-17 DIAGNOSIS — E86.0 DEHYDRATION: ICD-10-CM

## 2019-12-17 DIAGNOSIS — Z79.01 CURRENT USE OF LONG TERM ANTICOAGULATION: Chronic | ICD-10-CM

## 2019-12-17 DIAGNOSIS — C78.7 SECONDARY MALIGNANT NEOPLASM OF LIVER (HCC): Chronic | ICD-10-CM

## 2019-12-17 DIAGNOSIS — Z86.711 HISTORY OF PULMONARY EMBOLISM: Chronic | ICD-10-CM

## 2019-12-17 DIAGNOSIS — C20 MALIGNANT NEOPLASM OF RECTUM (HCC): ICD-10-CM

## 2019-12-17 DIAGNOSIS — J43.9 PULMONARY EMPHYSEMA, UNSPECIFIED EMPHYSEMA TYPE (HCC): Chronic | ICD-10-CM

## 2019-12-17 DIAGNOSIS — R73.9 HYPERGLYCEMIA: ICD-10-CM

## 2019-12-17 DIAGNOSIS — R94.31 ABNORMAL EKG: ICD-10-CM

## 2019-12-17 DIAGNOSIS — D64.9 ANEMIA, UNSPECIFIED TYPE: ICD-10-CM

## 2019-12-17 DIAGNOSIS — C20 MALIGNANT NEOPLASM OF RECTUM (HCC): Chronic | ICD-10-CM

## 2019-12-17 LAB
ALBUMIN SERPL BCP-MCNC: 3.9 G/DL (ref 3.2–4.9)
ALBUMIN/GLOB SERPL: 1.6 G/DL
ALP SERPL-CCNC: 132 U/L (ref 30–99)
ALT SERPL-CCNC: 20 U/L (ref 2–50)
ANION GAP SERPL CALC-SCNC: 11 MMOL/L (ref 0–11.9)
ANISOCYTOSIS BLD QL SMEAR: ABNORMAL
AST SERPL-CCNC: 24 U/L (ref 12–45)
BASOPHILS # BLD AUTO: 0.8 % (ref 0–1.8)
BASOPHILS # BLD: 0.04 K/UL (ref 0–0.12)
BILIRUB SERPL-MCNC: 0.6 MG/DL (ref 0.1–1.5)
BUN SERPL-MCNC: 19 MG/DL (ref 8–22)
CALCIUM SERPL-MCNC: 9.1 MG/DL (ref 8.5–10.5)
CEA SERPL-MCNC: 1.1 NG/ML (ref 0–3)
CHLORIDE SERPL-SCNC: 108 MMOL/L (ref 96–112)
CO2 SERPL-SCNC: 22 MMOL/L (ref 20–33)
COMMENT 1642: NORMAL
CREAT SERPL-MCNC: 1.08 MG/DL (ref 0.5–1.4)
DACRYOCYTES BLD QL SMEAR: NORMAL
EOSINOPHIL # BLD AUTO: 0.22 K/UL (ref 0–0.51)
EOSINOPHIL NFR BLD: 4.2 % (ref 0–6.9)
ERYTHROCYTE [DISTWIDTH] IN BLOOD BY AUTOMATED COUNT: 66.1 FL (ref 35.9–50)
GLOBULIN SER CALC-MCNC: 2.5 G/DL (ref 1.9–3.5)
GLUCOSE SERPL-MCNC: 108 MG/DL (ref 65–99)
HCT VFR BLD AUTO: 39.2 % (ref 37–47)
HGB BLD-MCNC: 12.1 G/DL (ref 12–16)
IMM GRANULOCYTES # BLD AUTO: 0.01 K/UL (ref 0–0.11)
IMM GRANULOCYTES NFR BLD AUTO: 0.2 % (ref 0–0.9)
LYMPHOCYTES # BLD AUTO: 1.05 K/UL (ref 1–4.8)
LYMPHOCYTES NFR BLD: 20 % (ref 22–41)
MACROCYTES BLD QL SMEAR: ABNORMAL
MCH RBC QN AUTO: 27.3 PG (ref 27–33)
MCHC RBC AUTO-ENTMCNC: 30.9 G/DL (ref 33.6–35)
MCV RBC AUTO: 88.3 FL (ref 81.4–97.8)
MICROCYTES BLD QL SMEAR: ABNORMAL
MONOCYTES # BLD AUTO: 0.69 K/UL (ref 0–0.85)
MONOCYTES NFR BLD AUTO: 13.1 % (ref 0–13.4)
MORPHOLOGY BLD-IMP: NORMAL
NEUTROPHILS # BLD AUTO: 3.24 K/UL (ref 2–7.15)
NEUTROPHILS NFR BLD: 61.7 % (ref 44–72)
NRBC # BLD AUTO: 0 K/UL
NRBC BLD-RTO: 0 /100 WBC
OVALOCYTES BLD QL SMEAR: NORMAL
PLATELET # BLD AUTO: 246 K/UL (ref 164–446)
PLATELET BLD QL SMEAR: NORMAL
PMV BLD AUTO: 9.8 FL (ref 9–12.9)
POIKILOCYTOSIS BLD QL SMEAR: NORMAL
POLYCHROMASIA BLD QL SMEAR: NORMAL
POTASSIUM SERPL-SCNC: 4 MMOL/L (ref 3.6–5.5)
PROT SERPL-MCNC: 6.4 G/DL (ref 6–8.2)
RBC # BLD AUTO: 4.44 M/UL (ref 4.2–5.4)
RBC BLD AUTO: PRESENT
SODIUM SERPL-SCNC: 141 MMOL/L (ref 135–145)
WBC # BLD AUTO: 5.3 K/UL (ref 4.8–10.8)

## 2019-12-17 PROCEDURE — 85025 COMPLETE CBC W/AUTO DIFF WBC: CPT

## 2019-12-17 PROCEDURE — 99213 OFFICE O/P EST LOW 20 MIN: CPT | Performed by: NURSE PRACTITIONER

## 2019-12-17 PROCEDURE — 700111 HCHG RX REV CODE 636 W/ 250 OVERRIDE (IP)

## 2019-12-17 PROCEDURE — 99214 OFFICE O/P EST MOD 30 MIN: CPT | Performed by: INTERNAL MEDICINE

## 2019-12-17 PROCEDURE — 80053 COMPREHEN METABOLIC PANEL: CPT

## 2019-12-17 PROCEDURE — 96374 THER/PROPH/DIAG INJ IV PUSH: CPT | Mod: XU

## 2019-12-17 PROCEDURE — G0498 CHEMO EXTEND IV INFUS W/PUMP: HCPCS

## 2019-12-17 PROCEDURE — 700111 HCHG RX REV CODE 636 W/ 250 OVERRIDE (IP): Performed by: NURSE PRACTITIONER

## 2019-12-17 PROCEDURE — A4212 NON CORING NEEDLE OR STYLET: HCPCS

## 2019-12-17 PROCEDURE — 82378 CARCINOEMBRYONIC ANTIGEN: CPT

## 2019-12-17 RX ORDER — DEXAMETHASONE SODIUM PHOSPHATE 4 MG/ML
8 INJECTION, SOLUTION INTRA-ARTICULAR; INTRALESIONAL; INTRAMUSCULAR; INTRAVENOUS; SOFT TISSUE ONCE
Status: COMPLETED | OUTPATIENT
Start: 2019-12-17 | End: 2019-12-17

## 2019-12-17 RX ORDER — POTASSIUM CHLORIDE 750 MG/1
TABLET, FILM COATED, EXTENDED RELEASE ORAL
COMMUNITY
Start: 2019-10-14 | End: 2020-03-18 | Stop reason: SDUPTHER

## 2019-12-17 RX ADMIN — FLUOROURACIL 3590 MG: 50 INJECTION, SOLUTION INTRAVENOUS at 09:18

## 2019-12-17 RX ADMIN — DEXAMETHASONE SODIUM PHOSPHATE 8 MG: 4 INJECTION, SOLUTION INTRA-ARTICULAR; INTRALESIONAL; INTRAMUSCULAR; INTRAVENOUS; SOFT TISSUE at 08:43

## 2019-12-17 RX ADMIN — HEPARIN 500 UNITS: 100 SYRINGE at 07:04

## 2019-12-17 ASSESSMENT — ENCOUNTER SYMPTOMS
ORTHOPNEA: 0
SHORTNESS OF BREATH: 0
BLURRED VISION: 0
DIZZINESS: 0
PND: 0
DOUBLE VISION: 0
NAUSEA: 0
WHEEZING: 0
SORE THROAT: 0
MUSCULOSKELETAL NEGATIVE: 1
GASTROINTESTINAL NEGATIVE: 1
PALPITATIONS: 0
MYALGIAS: 0
COUGH: 1
CHILLS: 0
SPUTUM PRODUCTION: 0
TINGLING: 0
VOMITING: 0
CLAUDICATION: 0
HEMOPTYSIS: 0
RESPIRATORY NEGATIVE: 1
FEVER: 0
STRIDOR: 0
NEUROLOGICAL NEGATIVE: 1
CONSTIPATION: 0
PALPITATIONS: 0
LOSS OF CONSCIOUSNESS: 0
CARDIOVASCULAR NEGATIVE: 1
WEIGHT LOSS: 1
CONSTITUTIONAL NEGATIVE: 1
WEAKNESS: 0
HEADACHES: 0
BRUISES/BLEEDS EASILY: 0
EYES NEGATIVE: 1
FEVER: 0
ABDOMINAL PAIN: 0
COUGH: 0
DIZZINESS: 1
CHILLS: 0
DIARRHEA: 1
SHORTNESS OF BREATH: 1

## 2019-12-17 ASSESSMENT — PAIN SCALES - GENERAL: PAINLEVEL: NO PAIN

## 2019-12-17 NOTE — PROGRESS NOTES
"Subjective:      Karlene Walters is a 79 y.o. 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  She presents accompanied by her  for today's visit.  Patient is currently on maintenance 5-FU every 2 weeks and is scheduled to receive cycle #90 today.    Patient denies any changes.  She does note that Thursday night and Friday night after chemotherapy when she is traveling back home to [place] are when she does not feel her best.  However she is doing fairly well.  She does note every once in a while she will have some nausea and/or vomiting.  She will note vomit maybe one time and then feels better and happens very intermittently.  Sometimes she notes some abdominal cramping every once a while as well but this is not new.  She has noticed a little bit increase in watery output from her ostomy and is taking Imodium as needed.  She also believes that it may be related to her diet she really enjoys salads which make it hard for her with her ostomy.  She has been doing well since being off Zantac with intermittent episodes of feeling of heartburn but they do resolve.  She has noted some weight loss since last seen denies any fevers or chills.  She does have a cough initially in the morning but not throughout the day.  She continues to have the chronic shortness of breath which is not changing.  She plans on discussing with her primary care provider to do some work-up with regards to her thyroid.  She denies mouth sores and does still have dry skin but no cracking or peeling noted on exam today.  She also notes every once while feeling dizzy or lightheadedness at times as well.    Allergies   Allergen Reactions   • Oxaliplatin Anaphylaxis   • Codeine      \"gets drunk\"   • Pcn [Penicillins] Itching   • Sulfa Drugs Itching   • Tape Rash     PAPER TAPE OK     Current Outpatient Medications on " File Prior to Visit   Medication Sig Dispense Refill   • albuterol (PROAIR HFA) 108 (90 Base) MCG/ACT Aero Soln inhalation aerosol Inhale 2 Puffs by mouth every four hours as needed for Shortness of Breath (wheezing). 1 Inhaler 11   • XARELTO 10 MG Tab tablet TAKE 1 TABLET EVERY DAY 90 Tab 1   • potassium chloride (MICRO-K) 10 MEQ capsule   3   • furosemide (LASIX) 40 MG Tab Take 1 Tab by mouth every day. 90 Tab 3   • lidocaine-prilocaine (EMLA) 2.5-2.5 % Cream APPLY A THICK FILM OVER THE PORT ACCESS SITE PRIOR TO ACCESS 30 g 3   • ondansetron (ZOFRAN) 4 MG Tab tablet Take 1 Tab by mouth as needed. 30 Tab 3   • Tiotropium Bromide-Olodaterol (STIOLTO RESPIMAT) 2.5-2.5 MCG/ACT Aero Soln Take 2 Puffs by mouth every day. 1 Inhaler 11   • metronidazole (METROCREAM) 0.75 % cream Apply 1 Each to affected area(s) 2 times a day.     • loperamide (IMODIUM) 2 MG Cap Take 2 mg by mouth 4 times a day as needed for Diarrhea.     • cyanocobalamin (VITAMIN B-12) 500 MCG Tab Take 1,000 mcg by mouth every day.     • Multiple Vitamins-Minerals (CENTRUM SILVER PO) Take 1 Tab by mouth every day.     • Cholecalciferol (VITAMIN D3) 400 UNITS CAPS Take 1 Cap by mouth every day.     • loratadine (CLARITIN) 10 MG TABS Take 10 mg by mouth every day. Indications: Hayfever     • potassium chloride ER (KLOR-CON) 10 MEQ tablet        No current facility-administered medications on file prior to visit.          Review of Systems   Constitutional: Positive for weight loss. Negative for chills and fever.   Eyes: Negative for blurred vision and double vision.   Respiratory: Positive for cough (initially in AM) and shortness of breath (chronic and not changing - will discuss with PCP and ask for thyroid panel).    Cardiovascular: Negative for chest pain and palpitations.   Gastrointestinal: Positive for diarrhea (output with ostomy more watery - taking imodium - but thinks it may be r/t some of the foods she eats). Negative for abdominal pain (cramps  "every once in a while), constipation, nausea and vomiting (every once in a while she will feel sick and vomit and feels better and it's just one time - happens not very often).   Genitourinary: Negative for dysuria.   Musculoskeletal: Negative for myalgias.   Skin: Negative for itching and rash.        Dry skin and no cracking or peeling noted   Neurological: Positive for dizziness (every once in a while may feel dizzy or lightheaded when standing). Negative for tingling and headaches.          Objective:     /62 (BP Location: Right arm, Patient Position: Sitting, BP Cuff Size: Adult)   Pulse 96   Temp 37 °C (98.6 °F) (Temporal)   Resp 18   Ht 1.651 m (5' 5\")   Wt 76.8 kg (169 lb 3.3 oz)   LMP  (LMP Unknown)   SpO2 97%   BMI 28.16 kg/m²      Physical Exam  Vitals signs reviewed.   Constitutional:       General: She is not in acute distress.     Appearance: Normal appearance. She is not diaphoretic.   HENT:      Head: Normocephalic and atraumatic.      Mouth/Throat:      Mouth: Mucous membranes are dry.      Pharynx: Oropharynx is clear. No oropharyngeal exudate or posterior oropharyngeal erythema.   Eyes:      General: No scleral icterus.        Right eye: No discharge.         Left eye: No discharge.      Extraocular Movements: Extraocular movements intact.      Conjunctiva/sclera: Conjunctivae normal.      Pupils: Pupils are equal, round, and reactive to light.   Cardiovascular:      Rate and Rhythm: Normal rate and regular rhythm.      Pulses: Normal pulses.      Heart sounds: Normal heart sounds. No murmur. No friction rub. No gallop.    Pulmonary:      Effort: Pulmonary effort is normal. No respiratory distress.      Breath sounds: Normal breath sounds. No wheezing.   Abdominal:      General: Abdomen is flat. Bowel sounds are normal. There is no distension.      Palpations: Abdomen is soft. There is no mass.      Tenderness: There is no tenderness.   Musculoskeletal: Normal range of motion.        "  General: No swelling or tenderness.   Skin:     General: Skin is warm and dry.   Neurological:      Mental Status: She is alert and oriented to person, place, and time.   Psychiatric:         Mood and Affect: Mood normal.         Behavior: Behavior normal.     Results for KANG HALL (MRN [id number])    Ref. Range 12/17/2019 07:10   WBC Latest Ref Range: 4.8 - 10.8 K/uL 5.3   RBC Latest Ref Range: 4.20 - 5.40 M/uL 4.44   Hemoglobin Latest Ref Range: 12.0 - 16.0 g/dL 12.1   Hematocrit Latest Ref Range: 37.0 - 47.0 % 39.2   MCV Latest Ref Range: 81.4 - 97.8 fL 88.3   MCH Latest Ref Range: 27.0 - 33.0 pg 27.3   MCHC Latest Ref Range: 33.6 - 35.0 g/dL 30.9 (L)   RDW Latest Ref Range: 35.9 - 50.0 fL 66.1 (H)   Platelet Count Latest Ref Range: 164 - 446 K/uL 246   MPV Latest Ref Range: 9.0 - 12.9 fL 9.8   Neutrophils-Polys Latest Ref Range: 44.00 - 72.00 % 61.70   Neutrophils (Absolute) Latest Ref Range: 2.00 - 7.15 K/uL 3.24   Lymphocytes Latest Ref Range: 22.00 - 41.00 % 20.00 (L)   Lymphs (Absolute) Latest Ref Range: 1.00 - 4.80 K/uL 1.05   Monocytes Latest Ref Range: 0.00 - 13.40 % 13.10   Monos (Absolute) Latest Ref Range: 0.00 - 0.85 K/uL 0.69   Eosinophils Latest Ref Range: 0.00 - 6.90 % 4.20   Eos (Absolute) Latest Ref Range: 0.00 - 0.51 K/uL 0.22   Basophils Latest Ref Range: 0.00 - 1.80 % 0.80   Baso (Absolute) Latest Ref Range: 0.00 - 0.12 K/uL 0.04   Immature Granulocytes Latest Ref Range: 0.00 - 0.90 % 0.20   Immature Granulocytes (abs) Latest Ref Range: 0.00 - 0.11 K/uL 0.01   Nucleated RBC Latest Units: /100 WBC 0.00   NRBC (Absolute) Latest Units: K/uL 0.00   Plt Estimation Unknown Normal   RBC Morphology Unknown Present   Anisocytosis Unknown 1+   Macrocytosis Unknown 1+   Microcytosis Unknown 1+   Polychromia Unknown 1+   Poikilocytosis Unknown 1+   Ovalocytes Unknown 1+   Tear Drop Cells Unknown 1+   Comments-Diff Unknown see below   Peripheral Smear Review Unknown see below   Sodium Latest Ref  Range: 135 - 145 mmol/L 141   Potassium Latest Ref Range: 3.6 - 5.5 mmol/L 4.0   Chloride Latest Ref Range: 96 - 112 mmol/L 108   Co2 Latest Ref Range: 20 - 33 mmol/L 22   Anion Gap Latest Ref Range: 0.0 - 11.9  11.0   Glucose Latest Ref Range: 65 - 99 mg/dL 108 (H)   Bun Latest Ref Range: 8 - 22 mg/dL 19   Creatinine Latest Ref Range: 0.50 - 1.40 mg/dL 1.08   GFR If  Latest Ref Range: >60 mL/min/1.73 m 2 59 (A)   GFR If Non  Latest Ref Range: >60 mL/min/1.73 m 2 49 (A)   Calcium Latest Ref Range: 8.5 - 10.5 mg/dL 9.1   AST(SGOT) Latest Ref Range: 12 - 45 U/L 24   ALT(SGPT) Latest Ref Range: 2 - 50 U/L 20   Alkaline Phosphatase Latest Ref Range: 30 - 99 U/L 132 (H)   Total Bilirubin Latest Ref Range: 0.1 - 1.5 mg/dL 0.6   Albumin Latest Ref Range: 3.2 - 4.9 g/dL 3.9   Total Protein Latest Ref Range: 6.0 - 8.2 g/dL 6.4   Globulin Latest Ref Range: 1.9 - 3.5 g/dL 2.5   A-G Ratio Latest Units: g/dL 1.6   Carcinoembryonic Antigen Latest Ref Range: 0.0 - 3.0 ng/mL 1.1           Assessment/Plan:       1. Malignant neoplasm of rectum (HCC)  REFERRAL TO ONCOLOGY NURSE NAVIGATOR This patient has a screening score of (must be 6 or above): 0   2. Secondary malignant neoplasm of liver (HCC)  REFERRAL TO ONCOLOGY NURSE NAVIGATOR This patient has a screening score of (must be 6 or above): 0     Plan  1.  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  She is scheduled to receive cycle #90 today.  I did review her CBC, CMP and CEA and labs are appropriate to proceed with treatment as planned.    Patient is scheduled for follow-up CT scans ordered by Dr. West at the end of this month.  She will follow-up with Dr. Steele in 1 month for further discussions on possible changes to her maintenance chemotherapy.  Dr. Steele and I have discussed the possibility of increasing the length of time in between infusions while continuing maintenance therapy,  however we will await results of CT scan and continue with single agent 5-FU every 2 weeks at this time.  Patient is also due to follow-up with her surgeon which she will see in January as well.    2.  Patient to follow-up again in 1 month or sooner if needed, and will continue with maintenance 5-FU every 2 weeks as planned and scheduled.

## 2019-12-18 NOTE — PROGRESS NOTES
Chief Complaint   Patient presents with   • Congestive Heart Failure       Subjective:   Karlene Walters is a 79 y.o. female who presents today as a follow-up for her PE shortness of breath abnormal EKG and chemotherapy.  Since she was last seen she continues to have her chronic shortness of breath.  She is on the Xarelto.  She is undergoing chemotherapy this week.  Her blood pressure is controlled.  She is not have any palpitations or chest pain.    Past Medical History:   Diagnosis Date   • Arrhythmia     occasional   • Blood clotting disorder (HCC) 08/2015    bilat PE    • Blood transfusion 1957   • Breath shortness     no O2 with moderate exertion   • Cancer (HCC) 09/2012    rectal cancer,  Liver CA-2015   • CATARACT     removed b/l   • Chemotherapy-induced neutropenia (HCC) 9/28/2016   • Chickenpox    • Colon cancer (HCC)    • Dental disorder     dentures UPPERS AND LOWERS   • Elevated alkaline phosphatase level 11/15/2017   • Emphysema of lung (HCC)    • Fall    • [place] measles    • Heart murmur     as a child   • Hemorrhagic disorder (HCC)     on Xarelto    • Hypercholesteremia    • Hypertension     no meds currently    • Ileostomy in place (HCC)    • Indigestion    • Influenza    • Lightheadedness 9/17/2015   • Mumps    • Obstruction     ileostomy   • Other specified symptom associated with female genital organs     HYSTERECTOMY 1993   • Pneumonia 05/2017    tx'd with antibx   • Pulmonary embolism (HCC)    • 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)    • Renal insufficiency 3/14/2016   • Routine general medical examination at a health care facility 3/14/2016    3/14/16   • Seasonal allergies    • Swelling of both ankles     Lasix PRN   • Tonsillitis      Past Surgical History:   Procedure Laterality Date   • HEPATIC ABLATION LAPAROSCOPIC  11/15/2018    Procedure: HEPATIC ABLATION LAPAROSCOPIC.- SEGMENT 7/8;  Surgeon: Kit West M.D.;  Location: SURGERY [facility];  Service:  General   • COLONOSCOPY WITH BIOPSY  8/23/2015    Procedure: COLONOSCOPY WITH BIOPSY;  Surgeon: Wiblur Glasgow M.D.;  Location: ENDOSCOPY [facility];  Service:    • HEPATIC ABLATION LAPAROSCOPIC  7/6/2015    Procedure: HEPATIC ABLATION LAPAROSCOPIC.;  Surgeon: Kit West M.D.;  Location: SURGERY [facility];  Service:    • CATH PLACEMENT Left 7/6/2015    Procedure: CATH PLACEMENT CEPHALIC POWERPORT;  Surgeon: Kit West M.D.;  Location: SURGERY [facility];  Service:    • FISTULA IN ANO REPAIR  1/28/2015    Performed by Kolton Montgomery M.D. at SURGERY [facility]   • PERINEAL PROCEDURE  1/28/2015    Performed by Kolton Montgomery M.D. at [facility]   • FISTULA IN ANO REPAIR  10/15/2014    Performed by Kolton Montgomery M.D. at [facility]   • PERINEAL PROCEDURE  10/15/2014    Performed by Kolton Montgomery M.D. at SURGERY [facility]   • IRRIGATION & DEBRIDEMENT GENERAL  2/19/2014    Performed by Kolton Montgomery M.D. at SURGERY [facility]   • FLAP GRAFT  2/19/2014    Performed by Kolton Montgomery M.D. at [facility]   • PERINEAL PROCEDURE  12/18/2013    Performed by Kolton Montgomery M.D. at [facility]   • MYOCUTANEOUS FLAP  12/18/2013    Performed by Kolton Montgomery M.D. at SURGERY [facility]   • IRRIGATION & DEBRIDEMENT GENERAL  10/23/2013    Performed by Kolton Montgomery M.D. at [facility]   • FISTULA IN ANO REPAIR  9/4/2013    Performed by Kolton Montgomery M.D. at [facility]   • FLAP ROTATION  9/4/2013    Performed by Kolton Montgomery M.D. at [facility]   • RECOVERY  8/26/2013    Performed by Cath-Recovery Surgery at SURGERY SAME DAY [facility]   • BIOPSY GENERAL  7/17/2013    Performed by Kolton Montgomery M.D. at [facility]   • PROCTOSCOPY  7/17/2013    Performed by Kolton Montgomery M.D. at SURGERY [facility]   • FISTULA IN ANO REPAIR  2/27/2013    Performed by Kolton Montgomery M.D. at  SURGERY [facility]   • SIGMOIDOSCOPY  2/27/2013    Performed by Kolton Montgomery M.D. at SURGERY [facility]   • LAPAROSCOPY  10/8/2012    Performed by Kolton Montgomery M.D. at SURGERY [facility]   • ILEO LOOP DIVERSION  10/8/2012    Performed by Kolton Montgomery M.D. at SURGERY [facility]   • COLECTOMY  9/26/2012    Performed by Kolton Montgomery M.D. at SURGERY [facility]   • COLONOSCOPY FLEX W/ENDO US  9/20/2012    Performed by Harshil Bolton M.D. at SURGERY [facility]   • OTHER  2009    left eye torn retina repair   • CATARACT EXTRACTION WITH IOL  2004    bilateral   • ABDOMINAL HYSTERECTOMY TOTAL  1983   • CYST EXCISION  1972    ovarian   • COLON RESECTION     • EYE SURGERY      cataracts   • HYSTERECTOMY LAPAROSCOPY     • PB REMV 2ND CATARACT,CORN-SCLER SECTN     • TONSILLECTOMY       Family History   Problem Relation Age of Onset   • Heart Disease Mother    • Heart Failure Mother    • Hypertension Mother    • Hyperlipidemia Mother    • Cancer Mother    • Cancer Maternal Aunt 60        breast ca   • Lung Disease Brother         COPD/Emphysema/Smoker   • Cancer Brother         prostate cancer   • Alcohol/Drug Brother         Alcohol   • Kidney Disease Father         renal failure     Social History     Socioeconomic History   • Marital status:      Spouse name: Not on file   • Number of children: Not on file   • Years of education: Not on file   • Highest education level: Not on file   Occupational History   • Not on file   Social Needs   • Financial resource strain: Not on file   • Food insecurity:     Worry: Not on file     Inability: Not on file   • Transportation needs:     Medical: Not on file     Non-medical: Not on file   Tobacco Use   • Smoking status: Never Smoker   • Smokeless tobacco: Never Used   Substance and Sexual Activity   • Alcohol use: No     Comment: rare   • Drug use: No   • Sexual activity: Never     Partners: Male     Birth control/protection: Post-Menopausal  "  Lifestyle   • Physical activity:     Days per week: Not on file     Minutes per session: Not on file   • Stress: Not on file   Relationships   • Social connections:     Talks on phone: Not on file     Gets together: Not on file     Attends Mandaeism service: Not on file     Active member of club or organization: Not on file     Attends meetings of clubs or organizations: Not on file     Relationship status: Not on file   • Intimate partner violence:     Fear of current or ex partner: Not on file     Emotionally abused: Not on file     Physically abused: Not on file     Forced sexual activity: Not on file   Other Topics Concern   • Primary/coprimary nurse & associates Not Asked   • Family contact information Not Asked   • OK to release patient information to the following Not Asked   • Patient preferred routine/Privacy concerns Not Asked   • Patient likes and dislikes Not Asked   • Participating In Research Study Not Asked   • Miscellaneous Not Asked   Social History Narrative   • Not on file     Allergies   Allergen Reactions   • Oxaliplatin Anaphylaxis   • Codeine      \"gets drunk\"   • Pcn [Penicillins] Itching   • Sulfa Drugs Itching   • Tape Rash     PAPER TAPE OK     Outpatient Encounter Medications as of 12/17/2019   Medication Sig Dispense Refill   • albuterol (PROAIR HFA) 108 (90 Base) MCG/ACT Aero Soln inhalation aerosol Inhale 2 Puffs by mouth every four hours as needed for Shortness of Breath (wheezing). 1 Inhaler 11   • XARELTO 10 MG Tab tablet TAKE 1 TABLET EVERY DAY 90 Tab 1   • potassium chloride (MICRO-K) 10 MEQ capsule   3   • furosemide (LASIX) 40 MG Tab Take 1 Tab by mouth every day. 90 Tab 3   • lidocaine-prilocaine (EMLA) 2.5-2.5 % Cream APPLY A THICK FILM OVER THE PORT ACCESS SITE PRIOR TO ACCESS 30 g 3   • ondansetron (ZOFRAN) 4 MG Tab tablet Take 1 Tab by mouth as needed. 30 Tab 3   • Tiotropium Bromide-Olodaterol (STIOLTO RESPIMAT) 2.5-2.5 MCG/ACT Aero Soln Take 2 Puffs by mouth every day. " "1 Inhaler 11   • metronidazole (METROCREAM) 0.75 % cream Apply 1 Each to affected area(s) 2 times a day.     • loperamide (IMODIUM) 2 MG Cap Take 2 mg by mouth 4 times a day as needed for Diarrhea.     • cyanocobalamin (VITAMIN B-12) 500 MCG Tab Take 1,000 mcg by mouth every day.     • Multiple Vitamins-Minerals (CENTRUM SILVER PO) Take 1 Tab by mouth every day.     • Cholecalciferol (VITAMIN D3) 400 UNITS CAPS Take 1 Cap by mouth every day.     • loratadine (CLARITIN) 10 MG TABS Take 10 mg by mouth every day. Indications: Hayfever     • potassium chloride ER (KLOR-CON) 10 MEQ tablet        No facility-administered encounter medications on file as of 12/17/2019.      Review of Systems   Constitutional: Negative.  Negative for chills, fever and malaise/fatigue.   HENT: Negative.  Negative for sore throat.    Eyes: Negative.    Respiratory: Negative.  Negative for cough, hemoptysis, sputum production, shortness of breath, wheezing and stridor.    Cardiovascular: Negative.  Negative for chest pain, palpitations, orthopnea, claudication, leg swelling and PND.   Gastrointestinal: Negative.    Genitourinary: Negative.    Musculoskeletal: Negative.    Skin: Negative.    Neurological: Negative.  Negative for dizziness, loss of consciousness and weakness.   Endo/Heme/Allergies: Negative.  Does not bruise/bleed easily.   All other systems reviewed and are negative.       Objective:   /72 (BP Location: Right arm, Patient Position: Sitting, BP Cuff Size: Adult)   Pulse 76   Ht 1.651 m (5' 5\")   Wt 79.4 kg (175 lb)   LMP  (LMP Unknown)   SpO2 98%   BMI 29.12 kg/m²     Physical Exam   Constitutional: She appears well-developed and well-nourished. No distress.   HENT:   Head: Normocephalic and atraumatic.   Right Ear: External ear normal.   Left Ear: External ear normal.   Nose: Nose normal.   Mouth/Throat: No oropharyngeal exudate.   Eyes: Pupils are equal, round, and reactive to light. Conjunctivae and EOM are " normal. Right eye exhibits no discharge. Left eye exhibits no discharge. No scleral icterus.   Neck: Neck supple. No JVD present.   Cardiovascular: Normal rate, regular rhythm and intact distal pulses. Exam reveals no gallop and no friction rub.   No murmur heard.  Pulmonary/Chest: Effort normal. No stridor. No respiratory distress. She has no wheezes. She has no rales. She exhibits no tenderness.   Abdominal: Soft. She exhibits no distension. There is no guarding.   Musculoskeletal: Normal range of motion.         General: No tenderness, deformity or edema.   Neurological: She is alert. She has normal reflexes. She displays normal reflexes. No cranial nerve deficit. She exhibits normal muscle tone. Coordination normal.   Skin: Skin is warm and dry. No rash noted. She is not diaphoretic. No erythema. No pallor.   Psychiatric: She has a normal mood and affect. Her behavior is normal. Judgment and thought content normal.   Nursing note and vitals reviewed.      Assessment:     1. Abnormal EKG     2. Anemia, unspecified type     3. Current use of long term anticoagulation     4. Dehydration     5. History of pulmonary embolism     6. Hyperglycemia     7. Hyperlipidemia, unspecified hyperlipidemia type     8. Renal insufficiency     9. Pulmonary emphysema, unspecified emphysema type (HCC)     10. Shortness of breath         Medical Decision Making:  Today's Assessment / Status / Plan:     79-year-old female with orthostatic tachycardia shortness of breath with PEs and lower extremity edema.  At this point she is doing well.  She has enough refills of her Xarelto.  Her shortness of breath is at baseline.  She is otherwise doing well.  We will see her back in 3 months.    1. Orthostatic tachycardia  - resolved     2. Shortness of breath with exertion and PEs, normal echo  - cont Rivaroxiban 10     3. LE Edema    - lasix 40 daily    - KCL 10 mEq daily

## 2019-12-19 ENCOUNTER — OUTPATIENT INFUSION SERVICES (OUTPATIENT)
Dept: ONCOLOGY | Facility: MEDICAL CENTER | Age: 79
End: 2019-12-19
Attending: INTERNAL MEDICINE
Payer: MEDICARE

## 2019-12-19 VITALS
DIASTOLIC BLOOD PRESSURE: 65 MMHG | SYSTOLIC BLOOD PRESSURE: 146 MMHG | BODY MASS INDEX: 28.72 KG/M2 | HEART RATE: 96 BPM | TEMPERATURE: 97 F | HEIGHT: 65 IN | WEIGHT: 172.4 LBS | OXYGEN SATURATION: 100 % | RESPIRATION RATE: 18 BRPM

## 2019-12-19 PROCEDURE — 96523 IRRIG DRUG DELIVERY DEVICE: CPT

## 2019-12-19 PROCEDURE — 700111 HCHG RX REV CODE 636 W/ 250 OVERRIDE (IP)

## 2019-12-19 RX ORDER — DEXTROSE MONOHYDRATE 50 MG/ML
INJECTION, SOLUTION INTRAVENOUS CONTINUOUS
Status: CANCELLED | OUTPATIENT
Start: 2019-12-31

## 2019-12-19 RX ORDER — DEXAMETHASONE SODIUM PHOSPHATE 4 MG/ML
8 INJECTION, SOLUTION INTRA-ARTICULAR; INTRALESIONAL; INTRAMUSCULAR; INTRAVENOUS; SOFT TISSUE ONCE
Status: CANCELLED | OUTPATIENT
Start: 2019-12-31

## 2019-12-19 RX ORDER — ONDANSETRON 2 MG/ML
4 INJECTION INTRAMUSCULAR; INTRAVENOUS
Status: CANCELLED | OUTPATIENT
Start: 2019-12-31

## 2019-12-19 RX ORDER — PROCHLORPERAZINE MALEATE 10 MG
10 TABLET ORAL EVERY 6 HOURS PRN
Status: CANCELLED | OUTPATIENT
Start: 2019-12-31

## 2019-12-19 RX ORDER — ONDANSETRON 8 MG/1
8 TABLET, ORALLY DISINTEGRATING ORAL
Status: CANCELLED | OUTPATIENT
Start: 2019-12-31

## 2019-12-19 RX ORDER — LIDOCAINE HYDROCHLORIDE 10 MG/ML
10 INJECTION, SOLUTION INFILTRATION; PERINEURAL ONCE
Status: CANCELLED | OUTPATIENT
Start: 2019-12-31

## 2019-12-19 RX ADMIN — HEPARIN 500 UNITS: 100 SYRINGE at 11:49

## 2019-12-19 NOTE — PROGRESS NOTES
[facility].  Pt voices no new complaints.  5FU pump volume is complete.  L chest port flushed with NS, brisk blood return observed and port was heparin locked.  Meade needle removed intact.  Site covered with gauze.  Confirmed next appt with patient.  Pt dc home to self care.

## 2019-12-30 ENCOUNTER — OUTPATIENT INFUSION SERVICES (OUTPATIENT)
Dept: ONCOLOGY | Facility: MEDICAL CENTER | Age: 79
End: 2019-12-30
Attending: INTERNAL MEDICINE
Payer: MEDICARE

## 2019-12-30 ENCOUNTER — HOSPITAL ENCOUNTER (OUTPATIENT)
Dept: RADIOLOGY | Facility: MEDICAL CENTER | Age: 79
End: 2019-12-30
Attending: SURGERY
Payer: MEDICARE

## 2019-12-30 VITALS
DIASTOLIC BLOOD PRESSURE: 81 MMHG | BODY MASS INDEX: 28.47 KG/M2 | WEIGHT: 170.86 LBS | OXYGEN SATURATION: 96 % | TEMPERATURE: 97.1 F | HEART RATE: 100 BPM | HEIGHT: 65 IN | SYSTOLIC BLOOD PRESSURE: 152 MMHG | RESPIRATION RATE: 18 BRPM

## 2019-12-30 DIAGNOSIS — C20 MALIGNANT NEOPLASM OF RECTUM (HCC): ICD-10-CM

## 2019-12-30 DIAGNOSIS — C78.7 SECONDARY MALIGNANT NEOPLASM OF LIVER (HCC): ICD-10-CM

## 2019-12-30 DIAGNOSIS — K76.9 LIVER DISEASE: ICD-10-CM

## 2019-12-30 LAB
ALBUMIN SERPL BCP-MCNC: 3.7 G/DL (ref 3.2–4.9)
ALBUMIN/GLOB SERPL: 1.3 G/DL
ALP SERPL-CCNC: 131 U/L (ref 30–99)
ALT SERPL-CCNC: 27 U/L (ref 2–50)
ANION GAP SERPL CALC-SCNC: 8 MMOL/L (ref 0–11.9)
ANISOCYTOSIS BLD QL SMEAR: ABNORMAL
AST SERPL-CCNC: 35 U/L (ref 12–45)
BASOPHILS # BLD AUTO: 0.8 % (ref 0–1.8)
BASOPHILS # BLD: 0.04 K/UL (ref 0–0.12)
BILIRUB SERPL-MCNC: 0.7 MG/DL (ref 0.1–1.5)
BUN SERPL-MCNC: 17 MG/DL (ref 8–22)
CALCIUM SERPL-MCNC: 9.4 MG/DL (ref 8.5–10.5)
CEA SERPL-MCNC: 1.1 NG/ML (ref 0–3)
CHLORIDE SERPL-SCNC: 105 MMOL/L (ref 96–112)
CO2 SERPL-SCNC: 24 MMOL/L (ref 20–33)
COMMENT 1642: NORMAL
CREAT SERPL-MCNC: 1.19 MG/DL (ref 0.5–1.4)
DACRYOCYTES BLD QL SMEAR: NORMAL
EOSINOPHIL # BLD AUTO: 0.2 K/UL (ref 0–0.51)
EOSINOPHIL NFR BLD: 3.9 % (ref 0–6.9)
ERYTHROCYTE [DISTWIDTH] IN BLOOD BY AUTOMATED COUNT: 65.9 FL (ref 35.9–50)
GLOBULIN SER CALC-MCNC: 2.9 G/DL (ref 1.9–3.5)
GLUCOSE SERPL-MCNC: 94 MG/DL (ref 65–99)
HCT VFR BLD AUTO: 39.2 % (ref 37–47)
HGB BLD-MCNC: 12.2 G/DL (ref 12–16)
HYPOCHROMIA BLD QL SMEAR: ABNORMAL
IMM GRANULOCYTES # BLD AUTO: 0.01 K/UL (ref 0–0.11)
IMM GRANULOCYTES NFR BLD AUTO: 0.2 % (ref 0–0.9)
LYMPHOCYTES # BLD AUTO: 1.09 K/UL (ref 1–4.8)
LYMPHOCYTES NFR BLD: 21.2 % (ref 22–41)
MCH RBC QN AUTO: 27.6 PG (ref 27–33)
MCHC RBC AUTO-ENTMCNC: 31.1 G/DL (ref 33.6–35)
MCV RBC AUTO: 88.7 FL (ref 81.4–97.8)
MICROCYTES BLD QL SMEAR: ABNORMAL
MONOCYTES # BLD AUTO: 0.74 K/UL (ref 0–0.85)
MONOCYTES NFR BLD AUTO: 14.4 % (ref 0–13.4)
MORPHOLOGY BLD-IMP: NORMAL
NEUTROPHILS # BLD AUTO: 3.07 K/UL (ref 2–7.15)
NEUTROPHILS NFR BLD: 59.5 % (ref 44–72)
NRBC # BLD AUTO: 0 K/UL
NRBC BLD-RTO: 0 /100 WBC
OVALOCYTES BLD QL SMEAR: NORMAL
PLATELET # BLD AUTO: 211 K/UL (ref 164–446)
PLATELET BLD QL SMEAR: NORMAL
PMV BLD AUTO: 9.6 FL (ref 9–12.9)
POIKILOCYTOSIS BLD QL SMEAR: NORMAL
POLYCHROMASIA BLD QL SMEAR: NORMAL
POTASSIUM SERPL-SCNC: 4.1 MMOL/L (ref 3.6–5.5)
PROT SERPL-MCNC: 6.6 G/DL (ref 6–8.2)
RBC # BLD AUTO: 4.42 M/UL (ref 4.2–5.4)
RBC BLD AUTO: PRESENT
SODIUM SERPL-SCNC: 137 MMOL/L (ref 135–145)
WBC # BLD AUTO: 5.2 K/UL (ref 4.8–10.8)

## 2019-12-30 PROCEDURE — 82378 CARCINOEMBRYONIC ANTIGEN: CPT

## 2019-12-30 PROCEDURE — 700117 HCHG RX CONTRAST REV CODE 255: Performed by: SURGERY

## 2019-12-30 PROCEDURE — 36591 DRAW BLOOD OFF VENOUS DEVICE: CPT | Performed by: CLINICAL NURSE SPECIALIST

## 2019-12-30 PROCEDURE — 74160 CT ABDOMEN W/CONTRAST: CPT

## 2019-12-30 PROCEDURE — 80053 COMPREHEN METABOLIC PANEL: CPT

## 2019-12-30 PROCEDURE — 700111 HCHG RX REV CODE 636 W/ 250 OVERRIDE (IP)

## 2019-12-30 PROCEDURE — 85025 COMPLETE CBC W/AUTO DIFF WBC: CPT

## 2019-12-30 PROCEDURE — 700111 HCHG RX REV CODE 636 W/ 250 OVERRIDE (IP): Performed by: RADIOLOGY

## 2019-12-30 PROCEDURE — A4212 NON CORING NEEDLE OR STYLET: HCPCS | Performed by: CLINICAL NURSE SPECIALIST

## 2019-12-30 RX ADMIN — HEPARIN 500 UNITS: 100 SYRINGE at 10:00

## 2019-12-30 RX ADMIN — HEPARIN 500 UNITS: 100 SYRINGE at 10:58

## 2019-12-30 RX ADMIN — IOHEXOL 100 ML: 350 INJECTION, SOLUTION INTRAVENOUS at 10:45

## 2019-12-30 NOTE — PROGRESS NOTES
Patient to infusion for port access and lab draw.  No concerns/complaints. Port accessed with Power Port needle for CT scan with brisk blood return.  Labs drawn for treatment tomorrow.  Port flushed, heparin locked and left accessed for CT scan immediately following infusion appointment. Patient will keep port in place for treatment tomorrow.

## 2019-12-31 ENCOUNTER — OUTPATIENT INFUSION SERVICES (OUTPATIENT)
Dept: ONCOLOGY | Facility: MEDICAL CENTER | Age: 79
End: 2019-12-31
Attending: INTERNAL MEDICINE
Payer: MEDICARE

## 2019-12-31 VITALS
HEART RATE: 115 BPM | HEIGHT: 65 IN | DIASTOLIC BLOOD PRESSURE: 60 MMHG | TEMPERATURE: 97.2 F | WEIGHT: 172.4 LBS | RESPIRATION RATE: 18 BRPM | OXYGEN SATURATION: 99 % | SYSTOLIC BLOOD PRESSURE: 133 MMHG | BODY MASS INDEX: 28.72 KG/M2

## 2019-12-31 DIAGNOSIS — C20 MALIGNANT NEOPLASM OF RECTUM (HCC): ICD-10-CM

## 2019-12-31 PROCEDURE — 96374 THER/PROPH/DIAG INJ IV PUSH: CPT | Mod: XU

## 2019-12-31 PROCEDURE — G0498 CHEMO EXTEND IV INFUS W/PUMP: HCPCS

## 2019-12-31 PROCEDURE — 700111 HCHG RX REV CODE 636 W/ 250 OVERRIDE (IP): Performed by: NURSE PRACTITIONER

## 2019-12-31 PROCEDURE — 96375 TX/PRO/DX INJ NEW DRUG ADDON: CPT

## 2019-12-31 PROCEDURE — 96413 CHEMO IV INFUSION 1 HR: CPT

## 2019-12-31 RX ORDER — DEXAMETHASONE SODIUM PHOSPHATE 4 MG/ML
8 INJECTION, SOLUTION INTRA-ARTICULAR; INTRALESIONAL; INTRAMUSCULAR; INTRAVENOUS; SOFT TISSUE ONCE
Status: COMPLETED | OUTPATIENT
Start: 2019-12-31 | End: 2019-12-31

## 2019-12-31 RX ADMIN — DEXAMETHASONE SODIUM PHOSPHATE 8 MG: 4 INJECTION, SOLUTION INTRA-ARTICULAR; INTRALESIONAL; INTRAMUSCULAR; INTRAVENOUS; SOFT TISSUE at 09:05

## 2019-12-31 RX ADMIN — FLUOROURACIL 3630 MG: 50 INJECTION, SOLUTION INTRAVENOUS at 09:47

## 2019-12-31 NOTE — PROGRESS NOTES
Chemotherapy Verification - SECONDARY RN     C99 ([place] C91)    Height = 165 cm  Weight = 78.2 kg  BSA = 1.89 m2       Medication: Fluorouracil (5FU)  Dose: 1920 mg/m2  Calculated Dose: 3628.8 mg over 46 hours                             I confirm that this process was performed independently.

## 2019-12-31 NOTE — PROGRESS NOTES
Patient arrived to the infusion center for 5FU CADD pump connect with her . No new complaints today. Patient is in great spirits. Port was already accessed from scans the other day. Port flushed with brisk blood return noted. Labs reviewed and in parameters to treat today. Decadron given IV push. 5FU pump connected and to run over 46 hours. Patient left infusion center with no apparent distress. Appointment for 1/2/20 at 1100 confirmed with patient for her disconnect from the pump.

## 2019-12-31 NOTE — PROGRESS NOTES
"Pharmacy Chemotherapy Verification Note:    Patient Name: Karlene Walters      Dx: Colon Ca      Protocol: 5-FU +        *Dosing Reference*                                    -- 10/31/17: delete Leucovorin and 5-FU push d/t neutropenia per Dr. Hardy   Fluorouracil 2400 mg/m² continuous infusion over 46-48 hours                              -- 20% reduction (1920 mg/m²) to be continued starting C28 per C Alsop APRN   14 day cycles for 12 cycles (adjuvant) or until disease progression or unacceptable toxicity    NCCN Guidelines for Colon Cancer. V.2.2015.  Jay GROVER, et al. Eur J Cancer.1999;35(9):1343-7.     Allergies:  Oxaliplatin; Codeine; Pcn [penicillins]; Sulfa drugs; and Tape     /60   Pulse (!) 115   Temp 36.2 °C (97.2 °F) (Temporal)   Resp 18   Ht 1.65 m (5' 4.96\")   Wt 78.2 kg (172 lb 6.4 oz)   LMP  (LMP Unknown)   SpO2 99%   BMI 28.72 kg/m²  Body surface area is 1.89 meters squared.    Labs 12/30/19  ANC~ 3070 Plt = 211k   Hgb = 12.2     SCr = 1.19 mg/dL CrCl ~ 46.8 mL/min   LFT's = 35/27/131 TBili = 0.7     Drug Order   (Drug name, dose, route, IV Fluid & volume, frequency, number of doses) Cycle: 99 ([place] cycle 91)      Previous treatment: 12/17/19     Medication = Fluorouracil (5-FU)  Base Dose = 1920 mg/m²  Calc Dose:Base Dose x 1.89 m² = 3628.8 mg  Final Dose = 3630 mg  Route = CIVI via CADD pump  Fluid & Volume = 72.6 mL (+ 3mL overfill = 75.6 mL)  Admin Duration = Over 46 hours at 1.6 mL/hour   via CADD pump       <10% difference, okay to treat with final written dose     By my signature below, I confirm this process was performed independently with the BSA and all final chemotherapy dosing calculations congruent. I have reviewed the above chemotherapy order and that my calculation of the final dose and BSA (when applicable) corroborate those calculations of the  pharmacist.     Demetrius Maldonado, PharmD    "

## 2019-12-31 NOTE — PROGRESS NOTES
"Pharmacy Chemotherapy Verification    Patient Name: Karlene Walters   Dx: Colon CA        Cycle 99 ([place] cycle 91)    Previous treatment: 12/17/19    Protocol: 5-FU     IV over 2 hours on Day 1, followed by    IVP on Day 1, followed by                               -- 10/31/17: delete Leucovorin and 5-FU push d/t neutropenia per Dr. Hardy   Fluorouracil  continuous infusion over 46-48 hours                              -- 20% reduction (1920 mg/m²) to be continued starting C28 per C Alsop APRN   14 day cycles for 12 cycles (adjuvant) or until disease progression or unacceptable toxicity  NCCN Guidelines for Colon Cancer. V.2.2015.  Jay T, et al. Eur J Cancer.1999;35(9):1343-7.      Allergies: Codeine; Oxaliplatin; Pcn; Sulfa drugs; and Tape       /60   Pulse (!) 115   Temp 36.2 °C (97.2 °F) (Temporal)   Resp 18   Ht 1.65 m (5' 4.96\")   Wt 78.2 kg (172 lb 6.4 oz)   LMP  (LMP Unknown)   SpO2 99%   BMI 28.72 kg/m²  Body surface area is 1.89 meters squared.    All lab results 12/30/19 within treatment parameters.       Fluorouracil (5-FU) 1920 mg/m² x 1.89m² = 3628mg              <10% difference, OK to treat with final dose = 3630mg    CIVI via CADD pump @ 1.6mL/hr over 46 hrs       DARIN Arroyo PharmAngelaD.                          "

## 2019-12-31 NOTE — PROGRESS NOTES
Chemotherapy Verification - PRIMARY RN      Height = 165 cm  Weight = 78.2 kg  BSA = 1.89       Medication: Fluorouracil  Dose: 1920 mg/m2  Calculated Dose: 3628.8 mg                               I confirm this process was performed independently with the BSA and all final chemotherapy dosing calculations congruent.  Any discrepancies of 10% or greater have been addressed with the chemotherapy pharmacist. The resolution of the discrepancy has been documented in the EPIC progress notes.

## 2020-01-02 ENCOUNTER — OUTPATIENT INFUSION SERVICES (OUTPATIENT)
Dept: ONCOLOGY | Facility: MEDICAL CENTER | Age: 80
End: 2020-01-02
Attending: INTERNAL MEDICINE
Payer: MEDICARE

## 2020-01-02 VITALS
SYSTOLIC BLOOD PRESSURE: 117 MMHG | BODY MASS INDEX: 29.16 KG/M2 | TEMPERATURE: 97 F | RESPIRATION RATE: 18 BRPM | HEIGHT: 65 IN | HEART RATE: 105 BPM | DIASTOLIC BLOOD PRESSURE: 57 MMHG | OXYGEN SATURATION: 90 % | WEIGHT: 175.04 LBS

## 2020-01-02 DIAGNOSIS — C20 MALIGNANT NEOPLASM OF RECTUM (HCC): ICD-10-CM

## 2020-01-02 PROCEDURE — 700111 HCHG RX REV CODE 636 W/ 250 OVERRIDE (IP)

## 2020-01-02 PROCEDURE — 96523 IRRIG DRUG DELIVERY DEVICE: CPT

## 2020-01-02 RX ADMIN — HEPARIN 500 UNITS: 100 SYRINGE at 11:00

## 2020-01-02 NOTE — PROGRESS NOTES
Pt arrived to [facility], ambulatory, for pump de-access. Pt voices no complaints. Pumpturned off upon arrival, 0 mL left to be delivered. Pump disconnected and chemotherapy cassette disposed of. Port flushed and heparin locked per policy, port de-accessed. Pt left IS with no s/sx of distress. Follow up appointment confirmed.

## 2020-01-13 ENCOUNTER — OFFICE VISIT (OUTPATIENT)
Dept: HEMATOLOGY ONCOLOGY | Facility: MEDICAL CENTER | Age: 80
End: 2020-01-13
Payer: MEDICARE

## 2020-01-13 VITALS
BODY MASS INDEX: 29.13 KG/M2 | TEMPERATURE: 98.4 F | OXYGEN SATURATION: 95 % | HEART RATE: 110 BPM | DIASTOLIC BLOOD PRESSURE: 82 MMHG | WEIGHT: 170.64 LBS | HEIGHT: 64 IN | RESPIRATION RATE: 16 BRPM | SYSTOLIC BLOOD PRESSURE: 118 MMHG

## 2020-01-13 DIAGNOSIS — C20 MALIGNANT NEOPLASM OF RECTUM (HCC): Chronic | ICD-10-CM

## 2020-01-13 DIAGNOSIS — Z86.711 HISTORY OF PULMONARY EMBOLISM: Chronic | ICD-10-CM

## 2020-01-13 PROCEDURE — 99213 OFFICE O/P EST LOW 20 MIN: CPT | Performed by: INTERNAL MEDICINE

## 2020-01-13 RX ORDER — ONDANSETRON 2 MG/ML
4 INJECTION INTRAMUSCULAR; INTRAVENOUS
Status: CANCELLED | OUTPATIENT
Start: 2020-01-14

## 2020-01-13 RX ORDER — ONDANSETRON 8 MG/1
8 TABLET, ORALLY DISINTEGRATING ORAL
Status: CANCELLED | OUTPATIENT
Start: 2020-01-14

## 2020-01-13 RX ORDER — LIDOCAINE HYDROCHLORIDE 10 MG/ML
10 INJECTION, SOLUTION INFILTRATION; PERINEURAL ONCE
Status: CANCELLED | OUTPATIENT
Start: 2020-01-14

## 2020-01-13 RX ORDER — DEXAMETHASONE SODIUM PHOSPHATE 4 MG/ML
8 INJECTION, SOLUTION INTRA-ARTICULAR; INTRALESIONAL; INTRAMUSCULAR; INTRAVENOUS; SOFT TISSUE ONCE
Status: CANCELLED | OUTPATIENT
Start: 2020-01-14

## 2020-01-13 RX ORDER — PROCHLORPERAZINE MALEATE 10 MG
10 TABLET ORAL EVERY 6 HOURS PRN
Status: CANCELLED | OUTPATIENT
Start: 2020-01-14

## 2020-01-13 RX ORDER — DEXTROSE MONOHYDRATE 50 MG/ML
INJECTION, SOLUTION INTRAVENOUS CONTINUOUS
Status: CANCELLED | OUTPATIENT
Start: 2020-01-14

## 2020-01-13 ASSESSMENT — PAIN SCALES - GENERAL: PAINLEVEL: NO PAIN

## 2020-01-13 NOTE — PROGRESS NOTES
Subjective:   1/14/2020 12:57 PM  Primary care physician:Manan Quiñonez M.D.  Medical Oncologist: Eric Hardy M.D.    Chief Complaint:   Chief Complaint   Patient presents with   • Follow-Up     [facility] CT COLON CA TO LIVER METS      Diagnosis:   1. Malignant neoplasm of rectum (HCC)  CT-ABDOMEN LIVER FOR HEPATIC MASS (CIRRHOSIS)   2. Secondary malignant neoplasm of liver (HCC)  CT-ABDOMEN LIVER FOR HEPATIC MASS (CIRRHOSIS)       History of presenting illness:  Karlene Walters  is a pleasant 79 y.o. year old female who presented with surveillance imaging status post ablation of a liver colon cancer metastasis.  I have personally reviewed the imaging from December 30, 2019 which reveals that the ablation cavity is shrinking again and there is slight arterial enhancement around the rim of the ablation cavity.  There is no obvious mass associated with it but as the patient cavity shrinks there is slight increase in arterial enhancement.  The patient denies any fever or chills, nausea or vomiting.  She was seen by the medical oncology team who recommended switching her to every 3 weeks instead of every other week for continuous 5-FU.  Her ECOG performance status is 0.  I have personally reviewed the imaging studies from December 30, 2019 as well as the CT scan from September 9, 2019 and there appears to be just shrinkage but minimal change.  There are no other tumor masses in the liver.  The peritoneum appears clear.  The patient CEA level is normal at 1.1 as well and has been stable that way for some time.      Past Medical History:   Diagnosis Date   • Arrhythmia     occasional   • Blood clotting disorder (HCC) 08/2015    bilat PE    • Blood transfusion 1957   • Breath shortness     no O2 with moderate exertion   • Cancer (HCC) 09/2012    rectal cancer,  Liver CA-2015   • CATARACT     removed b/l   • Chemotherapy-induced neutropenia (HCC) 9/28/2016   • Chickenpox    • Colon cancer (HCC)    • Dental disorder     dentures  UPPERS AND LOWERS   • Elevated alkaline phosphatase level 11/15/2017   • Emphysema of lung (HCC)    • Fall    • Wolof measles    • Heart murmur     as a child   • Hemorrhagic disorder (HCC)     on Xarelto    • Hypercholesteremia    • Hypertension     no meds currently    • Ileostomy in place (HCC)    • Indigestion    • Influenza    • Lightheadedness 9/17/2015   • Mumps    • Obstruction     ileostomy   • Other specified symptom associated with female genital organs     HYSTERECTOMY 1993   • Pneumonia 05/2017    tx'd with antibx   • Pulmonary embolism (HCC)    • 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)    • Renal insufficiency 3/14/2016   • Routine general medical examination at a health care facility 3/14/2016    3/14/16   • Seasonal allergies    • Swelling of both ankles     Lasix PRN   • Tonsillitis      Past Surgical History:   Procedure Laterality Date   • HEPATIC ABLATION LAPAROSCOPIC  11/15/2018    Procedure: HEPATIC ABLATION LAPAROSCOPIC.- SEGMENT 7/8;  Surgeon: Kit West M.D.;  Location: SURGERY [facility];  Service: General   • COLONOSCOPY WITH BIOPSY  8/23/2015    Procedure: COLONOSCOPY WITH BIOPSY;  Surgeon: Wilbur Glasgow M.D.;  Location: ENDOSCOPY [facility];  Service:    • HEPATIC ABLATION LAPAROSCOPIC  7/6/2015    Procedure: HEPATIC ABLATION LAPAROSCOPIC.;  Surgeon: Kit West M.D.;  Location: SURGERY [facility];  Service:    • CATH PLACEMENT Left 7/6/2015    Procedure: CATH PLACEMENT CEPHALIC POWERPORT;  Surgeon: Kit West M.D.;  Location: SURGERY [facility];  Service:    • FISTULA IN ANO REPAIR  1/28/2015    Performed by Kolton Montgomery M.D. at [facility]   • PERINEAL PROCEDURE  1/28/2015    Performed by Kolton Montgomery M.D. at [facility]   • FISTULA IN ANO REPAIR  10/15/2014    Performed by Kolton Montgomery M.D. at [facility]   • PERINEAL PROCEDURE  10/15/2014    Performed by Kolton Montgomery M.D.  at [facility]   • IRRIGATION & DEBRIDEMENT GENERAL  2/19/2014    Performed by Kolton Montgomery M.D. at [facility]   • FLAP GRAFT  2/19/2014    Performed by Kolton Montgomery M.D. at [facility]   • PERINEAL PROCEDURE  12/18/2013    Performed by Kolton Montgomery M.D. at [facility]   • MYOCUTANEOUS FLAP  12/18/2013    Performed by Kolton Montgomery M.D. at [facility]   • IRRIGATION & DEBRIDEMENT GENERAL  10/23/2013    Performed by Kolton Montgomery M.D. at [facility]   • FISTULA IN ANO REPAIR  9/4/2013    Performed by Kolton Montgomery M.D. at [facility]   • FLAP ROTATION  9/4/2013    Performed by Kolton Montgomery M.D. at [facility]   • RECOVERY  8/26/2013    Performed by Cath[facility]Recovery Surgery at [facility] SAME DAY [facility]   • BIOPSY GENERAL  7/17/2013    Performed by Kolton Montgomery M.D. at [facility]   • PROCTOSCOPY  7/17/2013    Performed by Kolton Montgomery M.D. at [facility]   • FISTULA IN ANO REPAIR  2/27/2013    Performed by Kolton Montgomery M.D. at [facility]   • SIGMOIDOSCOPY  2/27/2013    Performed by Kolton Montgomery M.D. at [facility]   • LAPAROSCOPY  10/8/2012    Performed by Kolton Montgomery M.D. at [facility]   • ILEO LOOP DIVERSION  10/8/2012    Performed by Kolton Montgomery M.D. at [facility]   • COLECTOMY  9/26/2012    Performed by Kolton Montgomery M.D. at [facility]   • COLONOSCOPY FLEX W/ENDO US  9/20/2012    Performed by Harshil Bolton M.D. at [facility]   • OTHER  2009    left eye torn retina repair   • CATARACT EXTRACTION WITH IOL  2004    bilateral   • ABDOMINAL HYSTERECTOMY TOTAL  1983   • CYST EXCISION  1972    ovarian   • COLON RESECTION     • EYE SURGERY      cataracts   • HYSTERECTOMY LAPAROSCOPY     • PB REMV 2ND CATARACT,CORN-SCLER SECTN     • TONSILLECTOMY       Allergies   Allergen Reactions   • Oxaliplatin Anaphylaxis  "  • Codeine      \"gets drunk\"   • Pcn [Penicillins] Itching   • Sulfa Drugs Itching   • Tape Rash     PAPER TAPE OK     Outpatient Encounter Medications as of 1/14/2020   Medication Sig Dispense Refill   • potassium chloride ER (KLOR-CON) 10 MEQ tablet      • albuterol (PROAIR HFA) 108 (90 Base) MCG/ACT Aero Soln inhalation aerosol Inhale 2 Puffs by mouth every four hours as needed for Shortness of Breath (wheezing). 1 Inhaler 11   • XARELTO 10 MG Tab tablet TAKE 1 TABLET EVERY DAY 90 Tab 1   • furosemide (LASIX) 40 MG Tab Take 1 Tab by mouth every day. 90 Tab 3   • lidocaine-prilocaine (EMLA) 2.5-2.5 % Cream APPLY A THICK FILM OVER THE PORT ACCESS SITE PRIOR TO ACCESS 30 g 3   • ondansetron (ZOFRAN) 4 MG Tab tablet Take 1 Tab by mouth as needed. 30 Tab 3   • Tiotropium Bromide-Olodaterol (STIOLTO RESPIMAT) 2.5-2.5 MCG/ACT Aero Soln Take 2 Puffs by mouth every day. 1 Inhaler 11   • metronidazole (METROCREAM) 0.75 % cream Apply 1 Each to affected area(s) 2 times a day.     • loperamide (IMODIUM) 2 MG Cap Take 2 mg by mouth 4 times a day as needed for Diarrhea.     • cyanocobalamin (VITAMIN B-12) 500 MCG Tab Take 1,000 mcg by mouth every day.     • Multiple Vitamins-Minerals (CENTRUM SILVER PO) Take 1 Tab by mouth every day.     • Cholecalciferol (VITAMIN D3) 400 UNITS CAPS Take 1 Cap by mouth every day.     • loratadine (CLARITIN) 10 MG TABS Take 10 mg by mouth every day. Indications: Hayfever     • [DISCONTINUED] fluorouracil (ADRUCIL) 3,610 mg in CADD Cassette/Bag Chemo infusion (for use in CADD PUMP)        No facility-administered encounter medications on file as of 1/14/2020.      Social History     Socioeconomic History   • Marital status:      Spouse name: Not on file   • Number of children: Not on file   • Years of education: Not on file   • Highest education level: Not on file   Occupational History   • Not on file   Social Needs   • Financial resource strain: Not on file   • Food insecurity:     " Worry: Not on file     Inability: Not on file   • Transportation needs:     Medical: Not on file     Non-medical: Not on file   Tobacco Use   • Smoking status: Never Smoker   • Smokeless tobacco: Never Used   Substance and Sexual Activity   • Alcohol use: No     Comment: rare   • Drug use: No   • Sexual activity: Never     Partners: Male     Birth control/protection: Post-Menopausal   Lifestyle   • Physical activity:     Days per week: Not on file     Minutes per session: Not on file   • Stress: Not on file   Relationships   • Social connections:     Talks on phone: Not on file     Gets together: Not on file     Attends Gnosticism service: Not on file     Active member of club or organization: Not on file     Attends meetings of clubs or organizations: Not on file     Relationship status: Not on file   • Intimate partner violence:     Fear of current or ex partner: Not on file     Emotionally abused: Not on file     Physically abused: Not on file     Forced sexual activity: Not on file   Other Topics Concern   • Primary/coprimary nurse & associates Not Asked   • Family contact information Not Asked   • OK to release patient information to the following Not Asked   • Patient preferred routine/Privacy concerns Not Asked   • Patient likes and dislikes Not Asked   • Participating In Research Study Not Asked   • Miscellaneous Not Asked   Social History Narrative   • Not on file      Social History     Tobacco Use   Smoking Status Never Smoker   Smokeless Tobacco Never Used     Social History     Substance and Sexual Activity   Alcohol Use No    Comment: rare     Social History     Substance and Sexual Activity   Drug Use No        Family History   Problem Relation Age of Onset   • Heart Disease Mother    • Heart Failure Mother    • Hypertension Mother    • Hyperlipidemia Mother    • Cancer Mother    • Cancer Maternal Aunt 60        breast ca   • Lung Disease Brother         COPD/Emphysema/Smoker   • Cancer Brother          "prostate cancer   • Alcohol/Drug Brother         Alcohol   • Kidney Disease Father         renal failure       Review of Systems   All other systems reviewed and are negative.       Objective:   /80 (BP Location: Right arm, Patient Position: Sitting, BP Cuff Size: Adult)   Pulse 97   Temp 36.2 °C (97.2 °F) (Temporal)   Ht 1.651 m (5' 5\")   Wt 77.1 kg (170 lb)   LMP  (LMP Unknown)   SpO2 98%   BMI 28.29 kg/m²     Physical Exam   Constitutional: She is oriented to person, place, and time. She appears well-developed and well-nourished.   HENT:   Head: Normocephalic and atraumatic.   Eyes: Pupils are equal, round, and reactive to light. Conjunctivae are normal.   Neck: Normal range of motion. Neck supple.   Cardiovascular: Normal rate and regular rhythm.   Pulmonary/Chest: Effort normal and breath sounds normal.   Abdominal: Soft. Bowel sounds are normal.   Her multiple incisions are well-healed   Musculoskeletal: Normal range of motion.   Neurological: She is alert and oriented to person, place, and time.   Skin: Skin is warm and dry.   Psychiatric: She has a normal mood and affect. Her behavior is normal. Judgment and thought content normal.   Nursing note and vitals reviewed.      Labs:  Results for KANG HALL (MRN [id number]) as of 1/13/2020 14:44   Ref. Range 12/30/2019 10:04   WBC Latest Ref Range: 4.8 - 10.8 K/uL 5.2   RBC Latest Ref Range: 4.20 - 5.40 M/uL 4.42   Hemoglobin Latest Ref Range: 12.0 - 16.0 g/dL 12.2   Hematocrit Latest Ref Range: 37.0 - 47.0 % 39.2   MCV Latest Ref Range: 81.4 - 97.8 fL 88.7   MCH Latest Ref Range: 27.0 - 33.0 pg 27.6   MCHC Latest Ref Range: 33.6 - 35.0 g/dL 31.1 (L)   RDW Latest Ref Range: 35.9 - 50.0 fL 65.9 (H)   Platelet Count Latest Ref Range: 164 - 446 K/uL 211   MPV Latest Ref Range: 9.0 - 12.9 fL 9.6   Neutrophils-Polys Latest Ref Range: 44.00 - 72.00 % 59.50   Neutrophils (Absolute) Latest Ref Range: 2.00 - 7.15 K/uL 3.07   Lymphocytes Latest Ref Range: " 22.00 - 41.00 % 21.20 (L)   Lymphs (Absolute) Latest Ref Range: 1.00 - 4.80 K/uL 1.09   Monocytes Latest Ref Range: 0.00 - 13.40 % 14.40 (H)   Monos (Absolute) Latest Ref Range: 0.00 - 0.85 K/uL 0.74   Eosinophils Latest Ref Range: 0.00 - 6.90 % 3.90   Eos (Absolute) Latest Ref Range: 0.00 - 0.51 K/uL 0.20   Basophils Latest Ref Range: 0.00 - 1.80 % 0.80   Baso (Absolute) Latest Ref Range: 0.00 - 0.12 K/uL 0.04   Immature Granulocytes Latest Ref Range: 0.00 - 0.90 % 0.20   Immature Granulocytes (abs) Latest Ref Range: 0.00 - 0.11 K/uL 0.01   Nucleated RBC Latest Units: /100 WBC 0.00   NRBC (Absolute) Latest Units: K/uL 0.00   Plt Estimation Unknown Normal   RBC Morphology Unknown Present   Hypochromia Unknown 1+   Anisocytosis Unknown 1+   Microcytosis Unknown 1+   Polychromia Unknown 1+   Poikilocytosis Unknown 2+   Ovalocytes Unknown 1+   Tear Drop Cells Unknown 1+   Comments-Diff Unknown see below   Peripheral Smear Review Unknown see below   Sodium Latest Ref Range: 135 - 145 mmol/L 137   Potassium Latest Ref Range: 3.6 - 5.5 mmol/L 4.1   Chloride Latest Ref Range: 96 - 112 mmol/L 105   Co2 Latest Ref Range: 20 - 33 mmol/L 24   Anion Gap Latest Ref Range: 0.0 - 11.9  8.0   Glucose Latest Ref Range: 65 - 99 mg/dL 94   Bun Latest Ref Range: 8 - 22 mg/dL 17   Creatinine Latest Ref Range: 0.50 - 1.40 mg/dL 1.19   GFR If  Latest Ref Range: >60 mL/min/1.73 m 2 53 (A)   GFR If Non  Latest Ref Range: >60 mL/min/1.73 m 2 44 (A)   Calcium Latest Ref Range: 8.5 - 10.5 mg/dL 9.4   AST(SGOT) Latest Ref Range: 12 - 45 U/L 35   ALT(SGPT) Latest Ref Range: 2 - 50 U/L 27   Alkaline Phosphatase Latest Ref Range: 30 - 99 U/L 131 (H)   Total Bilirubin Latest Ref Range: 0.1 - 1.5 mg/dL 0.7   Albumin Latest Ref Range: 3.2 - 4.9 g/dL 3.7   Total Protein Latest Ref Range: 6.0 - 8.2 g/dL 6.6   Globulin Latest Ref Range: 1.9 - 3.5 g/dL 2.9   A-G Ratio Latest Units: g/dL 1.3   Carcinoembryonic Antigen  Latest Ref Range: 0.0 - 3.0 ng/mL 1.1       Imaging:  Per my read, 12/30/19 CT     IMPRESSION:     The liver lesion decrease in size currently measuring 3.3 cm as compared to 3.7 cm previously.  Heterogeneous marginal enhancement again demonstrated. There is 9.9 mm focal enhancement along the medial rim of the lesion that could indicate recurrence.  There is ill-defined opacity at the anterior dome of the right hemidiaphragm adjacent to the liver lesion. Question infiltration extending into the base of the right lower lobe contiguous with the major fissure. This could be inflammatory or neoplastic.  No new liver lesions identified.  Bowel resections and right lower quadrant ostomy again demonstrated.       Pathology:  NA    Procedures: 11/15/18    Overlapping ablations to segment 7 of the liver x3, each being 4   minute at 140 yanes giving us a 4x5 cm ablation cavity overlapping the region   of the lateral posterior aspect and inferior aspect of the previous ablation   cavity.       Diagnosis:     1. Malignant neoplasm of rectum (HCC)  CT-ABDOMEN LIVER FOR HEPATIC MASS (CIRRHOSIS)   2. Secondary malignant neoplasm of liver (HCC)  CT-ABDOMEN LIVER FOR HEPATIC MASS (CIRRHOSIS)           Medical Decision Making:  Today's Assessment / Status / Plan:     In light of the present findings, I agree with the medical oncology team that I would space out her chemotherapy since there is no obvious sign of recurrence.  The enhancing region around the ablation cavity could be just D formation of the normal vasculature related to the ablation.  We will keep a close eye on it and set her up for a CT scan of the liver in 4 months from her last CT scan which was dated December 30, 2019.  She will then follow-up with me in the office.    I, Dr. West have entered, reviewed and confirmed the above diagnoses related to this patient on this date of service, 1/14/2020 12:57 PM.    She agreed and verbalized her agreement and  understanding with the current plan. I answered all questions and concerns she has at this time and advised her to call at any time in the interim with questions or concerns in regards to her care.    Thank you for allowing me to participate in her care, I will continue to follow closely.       Please note that this dictation was created using voice recognition software. I have made every reasonable attempt to correct obvious errors, but I expect that there are errors of grammar and possibly content that I did not discover before finalizing the note.     Thank you for this consultation and allowing me to participate in your patient's care. If I can be of further service please contact my office.

## 2020-01-13 NOTE — PROGRESS NOTES
Date of visit: 1/13/2020  10:10 AM    Chief Complaint:   1. 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  2. PE, on Xarelto.    Identification/Prior relevant history:   - initially diagnosed in 2012.   - developed stage IV disease with liver and lung metastases in 8/2015.   - underwent liver ablation followed by XELOX - allergic reaction to Oxaliplatin so switched to single agent Xeloda and then 5FU because of toxicity.   - 5/2017 treated with Y90 and then in 10/2018 microwave ablation by Dr. West.   - on maintenance 5- FU since.    - CT scan 9/9/19 show no diease in the chest and continued shrinkage of liver lesion.   - CT abdomen 12/30/19 showed further decrease in liver lesion    Interim history  Patient continues to do well with no significant new complaints today. She continues to have dyspnea and reports developing a dry cough recently. Denies fevers. She is fatigued however this has not worsened. Continues to be physically active managing all her activities of daily living. Reports reasonable appetite. Denies neuropathy. She is anxious about delays in chemotherapy and since it has worked well over the past few years she would not like to change much about her regimen.     Past Medical History:      Past Medical History:   Diagnosis Date   • Arrhythmia     occasional   • Blood clotting disorder (HCC) 08/2015    bilat PE    • Blood transfusion 1957   • Breath shortness     no O2 with moderate exertion   • Cancer (HCC) 09/2012    rectal cancer,  Liver CA-2015   • CATARACT     removed b/l   • Chemotherapy-induced neutropenia (HCC) 9/28/2016   • Chickenpox    • Colon cancer (HCC)    • Dental disorder     dentures UPPERS AND LOWERS   • Elevated alkaline phosphatase level 11/15/2017   • Emphysema of lung (HCC)    • Fall    • Hungarian measles    • Heart murmur     as a child   • Hemorrhagic disorder (HCC)     on Xarelto    • Hypercholesteremia    • Hypertension     no meds currently    • Ileostomy in  place (HCC)    • Indigestion    • Influenza    • Lightheadedness 9/17/2015   • Mumps    • Obstruction     ileostomy   • Other specified symptom associated with female genital organs     HYSTERECTOMY 1993   • Pneumonia 05/2017    tx'd with antibx   • Pulmonary embolism (HCC)    • 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)    • Renal insufficiency 3/14/2016   • Routine general medical examination at a health care facility 3/14/2016    3/14/16   • Seasonal allergies    • Swelling of both ankles     Lasix PRN   • Tonsillitis        Allergies:         Oxaliplatin; Codeine; Pcn [penicillins]; Sulfa drugs; and Tape    Medications:         Current Outpatient Medications   Medication Sig Dispense Refill   • potassium chloride ER (KLOR-CON) 10 MEQ tablet      • albuterol (PROAIR HFA) 108 (90 Base) MCG/ACT Aero Soln inhalation aerosol Inhale 2 Puffs by mouth every four hours as needed for Shortness of Breath (wheezing). 1 Inhaler 11   • XARELTO 10 MG Tab tablet TAKE 1 TABLET EVERY DAY 90 Tab 1   • furosemide (LASIX) 40 MG Tab Take 1 Tab by mouth every day. 90 Tab 3   • lidocaine-prilocaine (EMLA) 2.5-2.5 % Cream APPLY A THICK FILM OVER THE PORT ACCESS SITE PRIOR TO ACCESS 30 g 3   • ondansetron (ZOFRAN) 4 MG Tab tablet Take 1 Tab by mouth as needed. 30 Tab 3   • Tiotropium Bromide-Olodaterol (STIOLTO RESPIMAT) 2.5-2.5 MCG/ACT Aero Soln Take 2 Puffs by mouth every day. 1 Inhaler 11   • metronidazole (METROCREAM) 0.75 % cream Apply 1 Each to affected area(s) 2 times a day.     • loperamide (IMODIUM) 2 MG Cap Take 2 mg by mouth 4 times a day as needed for Diarrhea.     • cyanocobalamin (VITAMIN B-12) 500 MCG Tab Take 1,000 mcg by mouth every day.     • Multiple Vitamins-Minerals (CENTRUM SILVER PO) Take 1 Tab by mouth every day.     • Cholecalciferol (VITAMIN D3) 400 UNITS CAPS Take 1 Cap by mouth every day.     • loratadine (CLARITIN) 10 MG TABS Take 10 mg by mouth every day. Indications: Hayfever       No current  facility-administered medications for this visit.        Social History:     Social History     Socioeconomic History   • Marital status:      Spouse name: Not on file   • Number of children: Not on file   • Years of education: Not on file   • Highest education level: Not on file   Occupational History   • Not on file   Social Needs   • Financial resource strain: Not on file   • Food insecurity:     Worry: Not on file     Inability: Not on file   • Transportation needs:     Medical: Not on file     Non-medical: Not on file   Tobacco Use   • Smoking status: Never Smoker   • Smokeless tobacco: Never Used   Substance and Sexual Activity   • Alcohol use: No     Comment: rare   • Drug use: No   • Sexual activity: Never     Partners: Male     Birth control/protection: Post-Menopausal   Lifestyle   • Physical activity:     Days per week: Not on file     Minutes per session: Not on file   • Stress: Not on file   Relationships   • Social connections:     Talks on phone: Not on file     Gets together: Not on file     Attends Congregational service: Not on file     Active member of club or organization: Not on file     Attends meetings of clubs or organizations: Not on file     Relationship status: Not on file   • Intimate partner violence:     Fear of current or ex partner: Not on file     Emotionally abused: Not on file     Physically abused: Not on file     Forced sexual activity: Not on file   Other Topics Concern   • Primary/coprimary nurse & associates Not Asked   • Family contact information Not Asked   • OK to release patient information to the following Not Asked   • Patient preferred routine/Privacy concerns Not Asked   • Patient likes and dislikes Not Asked   • Participating In Research Study Not Asked   • Miscellaneous Not Asked   Social History Narrative   • Not on file       Family History:      Family History   Problem Relation Age of Onset   • Heart Disease Mother    • Heart Failure Mother    • Hypertension  "Mother    • Hyperlipidemia Mother    • Cancer Mother    • Cancer Maternal Aunt 60        breast ca   • Lung Disease Brother         COPD/Emphysema/Smoker   • Cancer Brother         prostate cancer   • Alcohol/Drug Brother         Alcohol   • Kidney Disease Father         renal failure       Review of Systems:  Constitutional: mild fatigue  HEENT: No new auditory or visual complaints. No sore throat and neck pain.     Respiratory: stable dyspnea, new dry cough  Cardiovascular: Negative for chest pain, palpitations, orthopnea and leg swelling.    Gastrointestinal: watery output from ostomy, thinks she has diarrhea  Genitourinary: Negative for dysuria, hematuria    Musculoskeletal: No new arthralgias or myalgias   Skin: Negative for rash and itching.    Neurological: Negative for focal weakness and headaches.    Endo/Heme/Allergies: No abnormal bleed/bruise.        Physical Exam:  Vitals: /82 (BP Location: Right arm, Patient Position: Sitting, BP Cuff Size: Adult)   Pulse (!) 110   Temp 36.9 °C (98.4 °F) (Temporal)   Resp 16   Ht 1.626 m (5' 4\")   Wt 77.4 kg (170 lb 10.2 oz)   LMP  (LMP Unknown)   SpO2 95%   BMI 29.29 kg/m²   General: No acute distress.  Eyes: Normal conjuctiva and lids. No icterus.  HEENT: Oropharynx clear.   Neck: Supple with no palpable masses.  Lymph nodes: No palpable cervical, supraclavicular or axillary lymphadenopathy.    CVS: regular rate and rhythm, no rubs or gallops.   RESP: Clear to auscultation bilaterally with normal respiratory effort.   ABD: Soft, non tender, non distended, normal bowel sounds, no palpable organomegaly.   EXT: No edema or cyanosis.  CNS: Alert and oriented x3, No focal deficits.  Skin: No rash on visible skin.      Labs:   No visits with results within 1 Week(s) from this visit.   Latest known visit with results is:   [facility] on 12/30/2019   Component Date Value Ref Range Status   • WBC 12/30/2019 5.2  4.8 - 10.8 K/uL Final   • RBC " 12/30/2019 4.42  4.20 - 5.40 M/uL Final   • Hemoglobin 12/30/2019 12.2  12.0 - 16.0 g/dL Final   • Hematocrit 12/30/2019 39.2  37.0 - 47.0 % Final   • MCV 12/30/2019 88.7  81.4 - 97.8 fL Final   • MCH 12/30/2019 27.6  27.0 - 33.0 pg Final   • MCHC 12/30/2019 31.1* 33.6 - 35.0 g/dL Final   • RDW 12/30/2019 65.9* 35.9 - 50.0 fL Final   • Platelet Count 12/30/2019 211  164 - 446 K/uL Final   • MPV 12/30/2019 9.6  9.0 - 12.9 fL Final   • Neutrophils-Polys 12/30/2019 59.50  44.00 - 72.00 % Final   • Lymphocytes 12/30/2019 21.20* 22.00 - 41.00 % Final   • Monocytes 12/30/2019 14.40* 0.00 - 13.40 % Final   • Eosinophils 12/30/2019 3.90  0.00 - 6.90 % Final   • Basophils 12/30/2019 0.80  0.00 - 1.80 % Final   • Immature Granulocytes 12/30/2019 0.20  0.00 - 0.90 % Final   • Nucleated RBC 12/30/2019 0.00  /100 WBC Final   • Neutrophils (Absolute) 12/30/2019 3.07  2.00 - 7.15 K/uL Final    Includes immature neutrophils, if present.   • Lymphs (Absolute) 12/30/2019 1.09  1.00 - 4.80 K/uL Final   • Monos (Absolute) 12/30/2019 0.74  0.00 - 0.85 K/uL Final   • Eos (Absolute) 12/30/2019 0.20  0.00 - 0.51 K/uL Final   • Baso (Absolute) 12/30/2019 0.04  0.00 - 0.12 K/uL Final   • Immature Granulocytes (abs) 12/30/2019 0.01  0.00 - 0.11 K/uL Final   • NRBC (Absolute) 12/30/2019 0.00  K/uL Final   • Hypochromia 12/30/2019 1+   Final   • Anisocytosis 12/30/2019 1+   Final   • Microcytosis 12/30/2019 1+   Final   • Sodium 12/30/2019 137  135 - 145 mmol/L Final   • Potassium 12/30/2019 4.1  3.6 - 5.5 mmol/L Final   • Chloride 12/30/2019 105  96 - 112 mmol/L Final   • Co2 12/30/2019 24  20 - 33 mmol/L Final   • Anion Gap 12/30/2019 8.0  0.0 - 11.9 Final   • Glucose 12/30/2019 94  65 - 99 mg/dL Final   • Bun 12/30/2019 17  8 - 22 mg/dL Final   • Creatinine 12/30/2019 1.19  0.50 - 1.40 mg/dL Final   • Calcium 12/30/2019 9.4  8.5 - 10.5 mg/dL Final   • AST(SGOT) 12/30/2019 35  12 - 45 U/L Final   • ALT(SGPT) 12/30/2019 27  2 - 50 U/L Final    • Alkaline Phosphatase 12/30/2019 131* 30 - 99 U/L Final   • Total Bilirubin 12/30/2019 0.7  0.1 - 1.5 mg/dL Final   • Albumin 12/30/2019 3.7  3.2 - 4.9 g/dL Final   • Total Protein 12/30/2019 6.6  6.0 - 8.2 g/dL Final   • Globulin 12/30/2019 2.9  1.9 - 3.5 g/dL Final   • A-G Ratio 12/30/2019 1.3  g/dL Final   • Carcinoembryonic Antigen 12/30/2019 1.1  0.0 - 3.0 ng/mL Final    Comment: Effective September 17, 2013 the quantitative determination  of Carcinoembryonic Antigen (CEA) will now be performed at  [facility].  The Access CEA paramagnetic  particle chemiluminescent immunoassay is used.  Results  obtained with different test methods or kits cannot be used interchangeably.  Measurement of CEA has been shown to be  clinically relevant in the management of patients with  colorectal, breast, lung, prostatic, pancreatic, and ovarian  carcinomas.  Smokers may have slightly elevated levels of CEA.     • GFR If  12/30/2019 53* >60 mL/min/1.73 m 2 Final   • GFR If Non  12/30/2019 44* >60 mL/min/1.73 m 2 Final   • Peripheral Smear Review 12/30/2019 see below   Final    Comment: Due to instrument suspect flags, further review of peripheral smear is  indicated on this patient sample. This review may or may not result in  abnormal findings.     • Plt Estimation 12/30/2019 Normal   Final   • RBC Morphology 12/30/2019 Present   Final   • Polychromia 12/30/2019 1+   Final   • Poikilocytosis 12/30/2019 2+   Final   • Ovalocytes 12/30/2019 1+   Final   • Tear Drop Cells 12/30/2019 1+   Final   • Comments-Diff 12/30/2019 see below   Final    Results have been verified by peripheral blood smear review.           Assessment and Plan:  Karlene Walters  is a 79 year old female who is here for follow-up of stage IV rectal carcinoma metastatic to liver, on maintenance 5-FU for several years now, tolerating well with most recent abdomen scan from 12/30/19 showing continued improvement  in known liver lesion. Patient agreed to an every 3 weeks chemotherapy schedule which may minimize toxicity and need for travel and possibly offer similar ongoing benefits. Patient agreed to return to clinic in 3 weeks prior to next cycle of chemotherapy. Will continue to follow CBC, CMP and CEA.     She agreed and verbalized understanding of the current plan.      SIGNATURES:  Aminah Landon    CC:  Manan Quiñonez M.D.  No ref. provider found

## 2020-01-14 ENCOUNTER — OFFICE VISIT (OUTPATIENT)
Dept: SURGICAL ONCOLOGY | Facility: MEDICAL CENTER | Age: 80
End: 2020-01-14
Payer: MEDICARE

## 2020-01-14 ENCOUNTER — OUTPATIENT INFUSION SERVICES (OUTPATIENT)
Dept: ONCOLOGY | Facility: MEDICAL CENTER | Age: 80
End: 2020-01-14
Attending: INTERNAL MEDICINE
Payer: MEDICARE

## 2020-01-14 VITALS
TEMPERATURE: 98.5 F | SYSTOLIC BLOOD PRESSURE: 119 MMHG | HEART RATE: 109 BPM | OXYGEN SATURATION: 96 % | RESPIRATION RATE: 18 BRPM | DIASTOLIC BLOOD PRESSURE: 77 MMHG | BODY MASS INDEX: 28.28 KG/M2 | HEIGHT: 65 IN | WEIGHT: 169.75 LBS

## 2020-01-14 VITALS
HEART RATE: 97 BPM | BODY MASS INDEX: 28.32 KG/M2 | WEIGHT: 170 LBS | HEIGHT: 65 IN | DIASTOLIC BLOOD PRESSURE: 80 MMHG | SYSTOLIC BLOOD PRESSURE: 128 MMHG | TEMPERATURE: 97.2 F | OXYGEN SATURATION: 98 %

## 2020-01-14 DIAGNOSIS — C78.7 SECONDARY MALIGNANT NEOPLASM OF LIVER (HCC): ICD-10-CM

## 2020-01-14 DIAGNOSIS — C20 MALIGNANT NEOPLASM OF RECTUM (HCC): ICD-10-CM

## 2020-01-14 LAB
ALBUMIN SERPL BCP-MCNC: 3.6 G/DL (ref 3.2–4.9)
ALBUMIN/GLOB SERPL: 1.3 G/DL
ALP SERPL-CCNC: 124 U/L (ref 30–99)
ALT SERPL-CCNC: 27 U/L (ref 2–50)
ANION GAP SERPL CALC-SCNC: 11 MMOL/L (ref 0–11.9)
ANISOCYTOSIS BLD QL SMEAR: ABNORMAL
AST SERPL-CCNC: 39 U/L (ref 12–45)
BASOPHILS # BLD AUTO: 0.9 % (ref 0–1.8)
BASOPHILS # BLD: 0.04 K/UL (ref 0–0.12)
BILIRUB SERPL-MCNC: 0.8 MG/DL (ref 0.1–1.5)
BUN SERPL-MCNC: 15 MG/DL (ref 8–22)
CALCIUM SERPL-MCNC: 9 MG/DL (ref 8.5–10.5)
CEA SERPL-MCNC: 1.1 NG/ML (ref 0–3)
CHLORIDE SERPL-SCNC: 107 MMOL/L (ref 96–112)
CO2 SERPL-SCNC: 23 MMOL/L (ref 20–33)
COMMENT 1642: NORMAL
CREAT SERPL-MCNC: 1.06 MG/DL (ref 0.5–1.4)
EOSINOPHIL # BLD AUTO: 0.17 K/UL (ref 0–0.51)
EOSINOPHIL NFR BLD: 3.8 % (ref 0–6.9)
ERYTHROCYTE [DISTWIDTH] IN BLOOD BY AUTOMATED COUNT: 67.1 FL (ref 35.9–50)
GLOBULIN SER CALC-MCNC: 2.7 G/DL (ref 1.9–3.5)
GLUCOSE SERPL-MCNC: 103 MG/DL (ref 65–99)
HCT VFR BLD AUTO: 39.1 % (ref 37–47)
HGB BLD-MCNC: 11.8 G/DL (ref 12–16)
HYPOCHROMIA BLD QL SMEAR: ABNORMAL
IMM GRANULOCYTES # BLD AUTO: 0.03 K/UL (ref 0–0.11)
IMM GRANULOCYTES NFR BLD AUTO: 0.7 % (ref 0–0.9)
LYMPHOCYTES # BLD AUTO: 1.06 K/UL (ref 1–4.8)
LYMPHOCYTES NFR BLD: 23.5 % (ref 22–41)
MACROCYTES BLD QL SMEAR: ABNORMAL
MCH RBC QN AUTO: 26.9 PG (ref 27–33)
MCHC RBC AUTO-ENTMCNC: 30.2 G/DL (ref 33.6–35)
MCV RBC AUTO: 89.3 FL (ref 81.4–97.8)
MICROCYTES BLD QL SMEAR: ABNORMAL
MONOCYTES # BLD AUTO: 0.76 K/UL (ref 0–0.85)
MONOCYTES NFR BLD AUTO: 16.8 % (ref 0–13.4)
MORPHOLOGY BLD-IMP: NORMAL
NEUTROPHILS # BLD AUTO: 2.46 K/UL (ref 2–7.15)
NEUTROPHILS NFR BLD: 54.3 % (ref 44–72)
NRBC # BLD AUTO: 0 K/UL
NRBC BLD-RTO: 0 /100 WBC
OVALOCYTES BLD QL SMEAR: NORMAL
PLATELET # BLD AUTO: 218 K/UL (ref 164–446)
PLATELET BLD QL SMEAR: NORMAL
PMV BLD AUTO: 9.7 FL (ref 9–12.9)
POIKILOCYTOSIS BLD QL SMEAR: NORMAL
POLYCHROMASIA BLD QL SMEAR: NORMAL
POTASSIUM SERPL-SCNC: 4 MMOL/L (ref 3.6–5.5)
PROT SERPL-MCNC: 6.3 G/DL (ref 6–8.2)
RBC # BLD AUTO: 4.38 M/UL (ref 4.2–5.4)
RBC BLD AUTO: PRESENT
SCHISTOCYTES BLD QL SMEAR: NORMAL
SODIUM SERPL-SCNC: 141 MMOL/L (ref 135–145)
SPHEROCYTES BLD QL SMEAR: NORMAL
WBC # BLD AUTO: 4.5 K/UL (ref 4.8–10.8)

## 2020-01-14 PROCEDURE — A4212 NON CORING NEEDLE OR STYLET: HCPCS

## 2020-01-14 PROCEDURE — 85025 COMPLETE CBC W/AUTO DIFF WBC: CPT

## 2020-01-14 PROCEDURE — 82378 CARCINOEMBRYONIC ANTIGEN: CPT

## 2020-01-14 PROCEDURE — 96374 THER/PROPH/DIAG INJ IV PUSH: CPT | Mod: XU

## 2020-01-14 PROCEDURE — G0498 CHEMO EXTEND IV INFUS W/PUMP: HCPCS

## 2020-01-14 PROCEDURE — 700111 HCHG RX REV CODE 636 W/ 250 OVERRIDE (IP): Performed by: INTERNAL MEDICINE

## 2020-01-14 PROCEDURE — 80053 COMPREHEN METABOLIC PANEL: CPT

## 2020-01-14 PROCEDURE — 96375 TX/PRO/DX INJ NEW DRUG ADDON: CPT

## 2020-01-14 PROCEDURE — 99215 OFFICE O/P EST HI 40 MIN: CPT | Performed by: SURGERY

## 2020-01-14 RX ORDER — DEXAMETHASONE SODIUM PHOSPHATE 4 MG/ML
8 INJECTION, SOLUTION INTRA-ARTICULAR; INTRALESIONAL; INTRAMUSCULAR; INTRAVENOUS; SOFT TISSUE ONCE
Status: COMPLETED | OUTPATIENT
Start: 2020-01-14 | End: 2020-01-14

## 2020-01-14 RX ADMIN — DEXAMETHASONE SODIUM PHOSPHATE 8 MG: 4 INJECTION, SOLUTION INTRA-ARTICULAR; INTRALESIONAL; INTRAMUSCULAR; INTRAVENOUS; SOFT TISSUE at 10:05

## 2020-01-14 RX ADMIN — FLUOROURACIL 3610 MG: 50 INJECTION, SOLUTION INTRAVENOUS at 10:23

## 2020-01-14 NOTE — PATIENT INSTRUCTIONS
The patient will undergo repeat imaging on April 30, 20 20 of the liver.  She will see me after it has been completed.

## 2020-01-14 NOTE — PROGRESS NOTES
Chemotherapy Verification - PRIMARY RN      Height = 1.65 m  Weight = 77 kg  BSA = 1.88 m2       Medication: fluorouracil  Dose: 1920 mg/m2  Calculated Dose: 3609.6 mg                             (In mg/m2, AUC, mg/kg)       I confirm this process was performed independently with the BSA and all final chemotherapy dosing calculations congruent.  Any discrepancies of 10% or greater have been addressed with the chemotherapy pharmacist. The resolution of the discrepancy has been documented in the EPIC progress notes.

## 2020-01-14 NOTE — PROGRESS NOTES
"Pt ambulatory w/  to [facility] for C91 D1 of 5FU pump only for CRC.  Pt w/ no s/s of infection, pt has no complaints at this time.  Pt presents w/ left side chest PORT, EMLA cream in place.  Cream wiped off, PORT accessed w/ 1\" low profile needle per pt request using sterile technique, brisk blood return noted, labs drawn per orders, flushed per protocol.  Pt lab results w/n parameters for tx today.  Pre-med administered w/ no adverse reactions.  PORT w/ brisk blood return prior to pump connect flushed per protocol.  5FU CADD pump connected to pt's PORT, all clamps opened, pump confirmed to be in RUN mode, placed in pt's konrad pack.  Pt left on foot in care of  in NAD.  Confirmed pt's disconnect appt for 1-16-20.  "

## 2020-01-14 NOTE — PROGRESS NOTES
"Pharmacy Chemotherapy Verification    Patient Name: Karlene Walters   Dx: Colon CA        Cycle 100 ([place] cycle 92)    Previous treatment: 12/17/19    Protocol: 5-FU     IV over 2 hours on Day 1, followed by    IVP on Day 1, followed by                               -- 10/31/17: delete Leucovorin and 5-FU push d/t neutropenia per Dr. Hardy   Fluorouracil  continuous infusion over 46-48 hours                              -- 20% reduction (1920 mg/m²) to be continued starting C28 per C Alsop APRN   14 day cycles for 12 cycles (adjuvant) or until disease progression or unacceptable toxicity  NCCN Guidelines for Colon Cancer. V.2.2015.  Jay T, et al. Eur J Cancer.1999;35(9):1343-7.      Allergies: Codeine; Oxaliplatin; Pcn; Sulfa drugs; and Tape       /77   Pulse (!) 109   Temp 36.9 °C (98.5 °F) (Temporal)   Resp 18   Ht 1.65 m (5' 4.96\")   Wt 77 kg (169 lb 12.1 oz)   LMP  (LMP Unknown)   SpO2 96%   BMI 28.28 kg/m²  Body surface area is 1.88 meters squared.    All lab results 1/14/20 within treatment parameters.       Fluorouracil (5-FU) 1920 mg/m² x 1.88m² = 3609mg              <10% difference, OK to treat with final dose = 3610mg    CIVI via CADD pump @ 1.6mL/hr over 46 hrs       DARIN Arroyo Pharm.D.                          "

## 2020-01-14 NOTE — PROGRESS NOTES
"Pharmacy Chemotherapy Verification Note:    Patient Name: Karlene Walters      Dx: Colon Ca      Protocol: 5-FU +        *Dosing Reference*                                    -- 10/31/17: delete Leucovorin and 5-FU push d/t neutropenia per Dr. Hardy   Fluorouracil 2400 mg/m² continuous infusion over 46-48 hours                              -- 20% reduction (1920 mg/m²) to be continued starting C28 per C Alsop APRN   14 day cycles for 12 cycles (adjuvant) or until disease progression or unacceptable toxicity    NCCN Guidelines for Colon Cancer. V.2.2015.  Jay GROVER, et al. Eur J Cancer.1999;35(9):1343-7.     Allergies:  Oxaliplatin; Codeine; Pcn [penicillins]; Sulfa drugs; and Tape     /77   Pulse (!) 109   Temp 36.9 °C (98.5 °F) (Temporal)   Resp 18   Ht 1.65 m (5' 4.96\")   Wt 77 kg (169 lb 12.1 oz)   LMP  (LMP Unknown)   SpO2 96%   BMI 28.28 kg/m²  Body surface area is 1.88 meters squared.    Labs 1/14/20  ANC~ 2460 Plt = 218k   Hgb = 11.8     SCr = 1.06 mg/dL CrCl ~ 51.7 mL/min   LFT's = 39/27/124 TBili = 0.8     Drug Order   (Drug name, dose, route, IV Fluid & volume, frequency, number of doses) Cycle: 100  Previous treatment: 12/31/19     Medication = Fluorouracil (5-FU)  Base Dose = 1920 mg/m²  Calc Dose:Base Dose x 1.88 m² = 3609.6 mg  Final Dose = 3610 mg  Route = CIVI via CADD pump  Fluid & Volume = 72.2 mL (+ 3mL overfill = 75.2 mL)  Admin Duration = Over 46 hours at 1.6 mL/hour   via CADD pump       <10% difference, okay to treat with final written dose     By my signature below, I confirm this process was performed independently with the BSA and all final chemotherapy dosing calculations congruent. I have reviewed the above chemotherapy order and that my calculation of the final dose and BSA (when applicable) corroborate those calculations of the  pharmacist.     Demetrius Maldonado, PharmD    "

## 2020-01-15 ENCOUNTER — HOSPITAL ENCOUNTER (EMERGENCY)
Facility: MEDICAL CENTER | Age: 80
End: 2020-01-15
Attending: EMERGENCY MEDICINE
Payer: MEDICARE

## 2020-01-15 ENCOUNTER — APPOINTMENT (RX ONLY)
Dept: URBAN - METROPOLITAN AREA CLINIC 4 | Facility: CLINIC | Age: 80
Setting detail: DERMATOLOGY
End: 2020-01-15

## 2020-01-15 VITALS
SYSTOLIC BLOOD PRESSURE: 164 MMHG | RESPIRATION RATE: 18 BRPM | BODY MASS INDEX: 29.53 KG/M2 | HEART RATE: 90 BPM | DIASTOLIC BLOOD PRESSURE: 83 MMHG | HEIGHT: 65 IN | TEMPERATURE: 96.8 F | WEIGHT: 177.25 LBS | OXYGEN SATURATION: 98 %

## 2020-01-15 DIAGNOSIS — D18.0 HEMANGIOMA: ICD-10-CM

## 2020-01-15 DIAGNOSIS — L81.4 OTHER MELANIN HYPERPIGMENTATION: ICD-10-CM

## 2020-01-15 DIAGNOSIS — L71.8 OTHER ROSACEA: ICD-10-CM

## 2020-01-15 DIAGNOSIS — D22 MELANOCYTIC NEVI: ICD-10-CM

## 2020-01-15 DIAGNOSIS — Z78.9 PROBLEM WITH VASCULAR ACCESS: ICD-10-CM

## 2020-01-15 DIAGNOSIS — L82.1 OTHER SEBORRHEIC KERATOSIS: ICD-10-CM

## 2020-01-15 PROBLEM — D22.71 MELANOCYTIC NEVI OF RIGHT LOWER LIMB, INCLUDING HIP: Status: ACTIVE | Noted: 2020-01-15

## 2020-01-15 PROBLEM — D22.62 MELANOCYTIC NEVI OF LEFT UPPER LIMB, INCLUDING SHOULDER: Status: ACTIVE | Noted: 2020-01-15

## 2020-01-15 PROBLEM — D18.01 HEMANGIOMA OF SKIN AND SUBCUTANEOUS TISSUE: Status: ACTIVE | Noted: 2020-01-15

## 2020-01-15 PROBLEM — D22.72 MELANOCYTIC NEVI OF LEFT LOWER LIMB, INCLUDING HIP: Status: ACTIVE | Noted: 2020-01-15

## 2020-01-15 PROBLEM — D22.61 MELANOCYTIC NEVI OF RIGHT UPPER LIMB, INCLUDING SHOULDER: Status: ACTIVE | Noted: 2020-01-15

## 2020-01-15 PROBLEM — D22.5 MELANOCYTIC NEVI OF TRUNK: Status: ACTIVE | Noted: 2020-01-15

## 2020-01-15 PROCEDURE — 99283 EMERGENCY DEPT VISIT LOW MDM: CPT

## 2020-01-15 PROCEDURE — ? ADDITIONAL NOTES

## 2020-01-15 PROCEDURE — 99213 OFFICE O/P EST LOW 20 MIN: CPT

## 2020-01-15 PROCEDURE — ? SUNSCREEN RECOMMENDATIONS

## 2020-01-15 PROCEDURE — ? COUNSELING

## 2020-01-15 PROCEDURE — ? PRESCRIPTION

## 2020-01-15 RX ORDER — METRONIDAZOLE 7.5 MG/G
1 CREAM TOPICAL QD
Qty: 1 | Refills: 5 | Status: ERX

## 2020-01-15 ASSESSMENT — LOCATION DETAILED DESCRIPTION DERM
LOCATION DETAILED: RIGHT ANTERIOR PROXIMAL THIGH
LOCATION DETAILED: LEFT ULNAR DORSAL HAND
LOCATION DETAILED: LEFT PROXIMAL DORSAL FOREARM
LOCATION DETAILED: RIGHT DISTAL POSTERIOR UPPER ARM
LOCATION DETAILED: RIGHT PROXIMAL DORSAL FOREARM
LOCATION DETAILED: RIGHT RADIAL DORSAL HAND
LOCATION DETAILED: LEFT ANTERIOR PROXIMAL THIGH
LOCATION DETAILED: LEFT CENTRAL MALAR CHEEK
LOCATION DETAILED: PERIUMBILICAL SKIN
LOCATION DETAILED: SUPERIOR THORACIC SPINE
LOCATION DETAILED: RIGHT CENTRAL MALAR CHEEK
LOCATION DETAILED: LEFT INFERIOR ANTERIOR NECK
LOCATION DETAILED: LEFT PROXIMAL POSTERIOR UPPER ARM
LOCATION DETAILED: RIGHT SUPERIOR MEDIAL MIDBACK
LOCATION DETAILED: LEFT SUPERIOR MEDIAL UPPER BACK
LOCATION DETAILED: RIGHT RIB CAGE

## 2020-01-15 ASSESSMENT — LOCATION SIMPLE DESCRIPTION DERM
LOCATION SIMPLE: LEFT FOREARM
LOCATION SIMPLE: RIGHT LOWER BACK
LOCATION SIMPLE: RIGHT HAND
LOCATION SIMPLE: LEFT CHEEK
LOCATION SIMPLE: LEFT UPPER BACK
LOCATION SIMPLE: UPPER BACK
LOCATION SIMPLE: RIGHT THIGH
LOCATION SIMPLE: RIGHT CHEEK
LOCATION SIMPLE: LEFT THIGH
LOCATION SIMPLE: RIGHT POSTERIOR UPPER ARM
LOCATION SIMPLE: LEFT HAND
LOCATION SIMPLE: LEFT ANTERIOR NECK
LOCATION SIMPLE: LEFT POSTERIOR UPPER ARM
LOCATION SIMPLE: ABDOMEN
LOCATION SIMPLE: RIGHT FOREARM

## 2020-01-15 ASSESSMENT — LOCATION ZONE DERM
LOCATION ZONE: TRUNK
LOCATION ZONE: ARM
LOCATION ZONE: FACE
LOCATION ZONE: HAND
LOCATION ZONE: LEG
LOCATION ZONE: NECK

## 2020-01-15 NOTE — PROCEDURE: MIPS QUALITY
Quality 402: Tobacco Use And Help With Quitting Among Adolescents: Patient screened for tobacco and never smoked
Quality 431: Preventive Care And Screening: Unhealthy Alcohol Use - Screening: Patient screened for unhealthy alcohol use using a single question and scores less than 2 times per year
Quality 111:Pneumonia Vaccination Status For Older Adults: Pneumococcal Vaccination Previously Received
Quality 226: Preventive Care And Screening: Tobacco Use: Screening And Cessation Intervention: Patient screened for tobacco use and is an ex/non-smoker
Detail Level: Detailed
Quality 130: Documentation Of Current Medications In The Medical Record: Current Medications Documented

## 2020-01-15 NOTE — ED NOTES
Discharge paperwork and instruction provided. Pt and  verbalized understanding, then ambulated out of ED, steady gait, alert and oriented.

## 2020-01-15 NOTE — ED PROVIDER NOTES
ED Provider Note    CHIEF COMPLAINT  Chief Complaint   Patient presents with   • Vascular Access Problem     port tilted and is painful, pump begun alarming around 0100       HPI  Karlene Walters is a 79 y.o. female who presents with pain at her port site in the left anterior chest.  The patient is undergoing chemotherapy for metastatic colon cancer.  She presented to the oncologic infusion center and had placement of a pump and was supposed to receive chemotherapy over 24-hour period.  The patient went home and around 1 AM her pump started to alarm that there was a downstream occlusion.  The patient spoke with her APN from her oncologist and instructed to come into the emergency department.  The patient otherwise has not had any recent fevers.  She does not have any change in her breathing patterns.  REVIEW OF SYSTEMS  See HPI for further details. All other systems are negative.     PAST MEDICAL HISTORY  Past Medical History:   Diagnosis Date   • Elevated alkaline phosphatase level 11/15/2017   • Pneumonia 05/2017    tx'd with antibx   • Chemotherapy-induced neutropenia (HCC) 9/28/2016   • Routine general medical examination at a health care facility 3/14/2016    3/14/16   • Renal insufficiency 3/14/2016   • Lightheadedness 9/17/2015   • Blood clotting disorder (HCC) 08/2015    bilat PE    • Cancer (HCC) 09/2012    rectal cancer,  Liver CA-2015   • Blood transfusion 1957   • Arrhythmia     occasional   • Breath shortness     no O2 with moderate exertion   • CATARACT     removed b/l   • Chickenpox    • Colon cancer (HCC)    • Dental disorder     dentures UPPERS AND LOWERS   • Emphysema of lung (HCC)    • Fall    • Afghan measles    • Heart murmur     as a child   • Hemorrhagic disorder (HCC)     on Xarelto    • Hypercholesteremia    • Hypertension     no meds currently    • Ileostomy in place (HCC)    • Indigestion    • Influenza    • Mumps    • Obstruction     ileostomy   • Other specified symptom associated  with female genital organs     HYSTERECTOMY 1993   • Pulmonary embolism (HCC)    • 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)    • Seasonal allergies    • Swelling of both ankles     Lasix PRN   • Tonsillitis        FAMILY HISTORY  [unfilled]    SOCIAL HISTORY  Social History     Socioeconomic History   • Marital status:      Spouse name: Not on file   • Number of children: Not on file   • Years of education: Not on file   • Highest education level: Not on file   Occupational History   • Not on file   Social Needs   • Financial resource strain: Not on file   • Food insecurity:     Worry: Not on file     Inability: Not on file   • Transportation needs:     Medical: Not on file     Non-medical: Not on file   Tobacco Use   • Smoking status: Never Smoker   • Smokeless tobacco: Never Used   Substance and Sexual Activity   • Alcohol use: No     Comment: rare   • Drug use: No   • Sexual activity: Never     Partners: Male     Birth control/protection: Post-Menopausal   Lifestyle   • Physical activity:     Days per week: Not on file     Minutes per session: Not on file   • Stress: Not on file   Relationships   • Social connections:     Talks on phone: Not on file     Gets together: Not on file     Attends [employer] service: Not on file     Active member of club or organization: Not on file     Attends meetings of clubs or organizations: Not on file     Relationship status: Not on file   • Intimate partner violence:     Fear of current or ex partner: Not on file     Emotionally abused: Not on file     Physically abused: Not on file     Forced sexual activity: Not on file   Other Topics Concern   • Primary/coprimary nurse & associates Not Asked   • Family contact information Not Asked   • OK to release patient information to the following Not Asked   • Patient preferred routine/Privacy concerns Not Asked   • Patient likes and dislikes Not Asked   • Participating In Research Study Not Asked   • Miscellaneous Not Asked    Social History Narrative   • Not on file       SURGICAL HISTORY  Past Surgical History:   Procedure Laterality Date   • HEPATIC ABLATION LAPAROSCOPIC  11/15/2018    Procedure: HEPATIC ABLATION LAPAROSCOPIC.- SEGMENT 7/8;  Surgeon: Kit West M.D.;  Location: [facility];  Service: General   • COLONOSCOPY WITH BIOPSY  8/23/2015    Procedure: COLONOSCOPY WITH BIOPSY;  Surgeon: Wilbur Glasgow M.D.;  Location: [facility];  Service:    • HEPATIC ABLATION LAPAROSCOPIC  7/6/2015    Procedure: HEPATIC ABLATION LAPAROSCOPIC.;  Surgeon: Kit West M.D.;  Location: SURGERY [facility];  Service:    • CATH PLACEMENT Left 7/6/2015    Procedure: CATH PLACEMENT CEPHALIC POWERPORT;  Surgeon: Kit West M.D.;  Location: SURGERY [facility];  Service:    • FISTULA IN ANO REPAIR  1/28/2015    Performed by Kolton Montgomery M.D. at [facility]   • PERINEAL PROCEDURE  1/28/2015    Performed by Kolton Montgomery M.D. at [facility]   • FISTULA IN ANO REPAIR  10/15/2014    Performed by Kolton Montgomery M.D. at [facility]   • PERINEAL PROCEDURE  10/15/2014    Performed by Kolton Montgomery M.D. at [facility]   • IRRIGATION & DEBRIDEMENT GENERAL  2/19/2014    Performed by Kolton Montgomery M.D. at [facility]   • FLAP GRAFT  2/19/2014    Performed by Kolton Montgomery M.D. at [facility]   • PERINEAL PROCEDURE  12/18/2013    Performed by Kolton Montgomery M.D. at [facility]   • MYOCUTANEOUS FLAP  12/18/2013    Performed by Kolton Montgomery M.D. at [facility]   • IRRIGATION & DEBRIDEMENT GENERAL  10/23/2013    Performed by Kolton Montgomery M.D. at [facility]   • FISTULA IN ANO REPAIR  9/4/2013    Performed by Kolton Montgomery M.D. at [facility]   • FLAP ROTATION  9/4/2013    Performed by Kolton Montgomery M.D. at [facility]   • RECOVERY  8/26/2013    Performed by  "[facility] Surgery at SURGERY SAME DAY [facility] ORS   • BIOPSY GENERAL  7/17/2013    Performed by Kolton Montgomery M.D. at SURGERY [facility] ORS   • PROCTOSCOPY  7/17/2013    Performed by Kolton Montgomery M.D. at SURGERY [facility] ORS   • FISTULA IN ANO REPAIR  2/27/2013    Performed by Kolton Montgomery M.D. at SURGERY [facility] ORS   • SIGMOIDOSCOPY  2/27/2013    Performed by Kolton Montgomery M.D. at SURGERY [facility] ORS   • LAPAROSCOPY  10/8/2012    Performed by Kolton Montgomery M.D. at SURGERY [facility] ORS   • ILEO LOOP DIVERSION  10/8/2012    Performed by Kolton Montgomery M.D. at SURGERY [facility] ORS   • COLECTOMY  9/26/2012    Performed by Kolton Montgomery M.D. at SURGERY [facility] ORS   • COLONOSCOPY FLEX W/ENDO US  9/20/2012    Performed by Harshil Bolton M.D. at SURGERY [facility]   • OTHER  2009    left eye torn retina repair   • CATARACT EXTRACTION WITH IOL  2004    bilateral   • ABDOMINAL HYSTERECTOMY TOTAL  1983   • CYST EXCISION  1972    ovarian   • COLON RESECTION     • EYE SURGERY      cataracts   • HYSTERECTOMY LAPAROSCOPY     • PB REMV 2ND CATARACT,CORN-SCLER SECTN     • TONSILLECTOMY         CURRENT MEDICATIONS  Home Medications    **Home medications have not yet been reviewed for this encounter**         ALLERGIES  Allergies   Allergen Reactions   • Oxaliplatin Anaphylaxis   • Codeine      \"gets drunk\"   • Pcn [Penicillins] Itching   • Sulfa Drugs Itching   • Tape Rash     PAPER TAPE OK       PHYSICAL EXAM  VITAL SIGNS: BP (!) 200/100   Pulse 100   Temp 36 °C (96.8 °F) (Oral)   Resp 18   Ht 1.651 m (5' 5\")   Wt 80.4 kg (177 lb 4 oz)   LMP  (LMP Unknown)   SpO2 96%   BMI 29.50 kg/m²  Room air O2: 96    Constitutional: Chronically ill in appearance but no acute distress  HENT: Normocephalic, Atraumatic, Bilateral external ears normal, Oropharynx moist, No oral exudates, Nose normal.   Eyes: PERRLA, EOMI, Conjunctiva normal, No discharge.   Neck: Normal range of motion, No tenderness, " Supple, No stridor.   Lymphatic: No lymphadenopathy noted.   Cardiovascular: Normal heart rate, Normal rhythm, No murmurs, No rubs, No gallops.   Thorax & Lungs: Normal breath sounds, No respiratory distress, No wheezing, No chest tenderness.   Abdomen: Bowel sounds normal, Soft, No tenderness, No masses, No pulsatile masses.   Skin: No erythema nor induration around the port site skin otherwise warm, Dry, No erythema, No rash.   Back: No tenderness, No CVA tenderness.   Extremities: Intact distal pulses, No edema, No tenderness, No cyanosis, No clubbing.   Neurologic: Alert & oriented x 3, Normal motor function, Normal sensory function, No focal deficits noted.   Psychiatric: Affect normal, Judgment normal, Mood normal.       COURSE & MEDICAL DECISION MAKING  Pertinent Labs & Imaging studies reviewed. (See chart for details)  The oncology nurses were at the bedside on my evaluation.  The patient's had the port for quite some time and it is deep and does have some surrounding scar tissue.  It is believed that the port was never accessed appropriately and the chemotherapy was infused into the chest wall.  The oncologic nursing team states that this is not can cause any significant problems.  They recommend applying ice every 4 hours for 15 minutes.  They state that the chemotherapeutic agents will not cause any tissue necrosis or problem except for pain.  The patient is scheduled to see Dr. Steele her oncologist tomorrow for further instruction regarding placing a new port or using a longer needle they could access the port appropriately.      FINAL IMPRESSION  1.  Infiltration of chemotherapy to the left chest wall  2.  Port failure for chemotherapy    Disposition  The patient will be discharged in stable condition    Of note the patient was hypertensive and will make sure the blood pressure was rechecked prior to discharge to make sure it does not continue to increase.  The patient is aware she needs to have a blood  pressure check tomorrow as well.      Electronically signed by: Olman Sales M.D., 1/15/2020 2:56 AM

## 2020-01-15 NOTE — PROGRESS NOTES
Pt presented to [facility] this morning after visiting ER last night and 801 this AM. Port needle became dislodged last night and 5FU infiltrated to port site. Pump off at time of pt's arrival to IS. Site assessed and appears only slightly swollen but pt reported this is normal for her after her port is accessed. Pt declining offers of lidocaine to numb site. Port site cleansed and re-accessed in sterile field. Port flushed easily and brisk blood return observed. Pump settings verified. Pt received 23.3 ml, remaining vol 50 ml. Current rate 1.6 ml/hr so expected pump infusion time will be roughly 31.25 hours. Pt's appt for tomorrow shifted to the afternoon to accommodate additional time for pump. Pt knows when to return, discharged home under care of  in no apparent distress.

## 2020-01-15 NOTE — PROGRESS NOTES
"Received call from Ella GONZALEZ about patient coming into the ER running continuous chemotherapy (5FU) having pump trouble and significant pain at the port insertion site concerning for chemotherapy infiltration. This RN looked up the chemotherapy guidelines and policy for infiltration, and found that 5FU is not a drug vesicant nor does it requires an antidote. It is a irritant that requires a cold compress for 15-20 min every 4 hours for 24 hours.  Communicated this with Ella. Received orders to aspirate what I could, de-access port with or without blood return, and start cold compress process. This RN was informed when patient arrived to the ED. Upon assessment chemotherapy pump is turned off and lines are clamped. Pt's chest is slightly swollen around insertion site and port needle appears to be tilted to the side. Patient does report tenderness upon touch and any movement with L arm. Pt also reports that she requires a 1\" needle and has a feeling the wrong size was used to access her. This RN disconnected tubing from patient and attempted to aspirate any chemotherapy. No Chemotherapy or blood was able to be drawn back. This RN then de-accessed the port. The needle that was de-accessed was a 0.75\" needle. This RN then educated the patient and patient's  about icing the insertion site for 15-20 min every 4 hours for the next 24 hours. Also if any new or worsening symptoms occur to return to the ER immediately. Patient and patient's  verbalized understanding. The patient also reports relief in pain with L arm movement once port was de-accessed. ED RN in care of the patient was updated and educated on icing patient insertion site for 15-20 min every 4 hours for the next 24 hours. ED RN verbalized understanding. Ella was also updated. No new orders received. Patient will return to later this morning for an appointment with Ella as a follow up.   "

## 2020-01-15 NOTE — ED NOTES
Notified Leatha (ext. [id number]), charge RN on oncology unit, of pt's arrival to ED. Leatha states APRN has given her orders to attempt to aspirate port, deaccess, reaccess, and flush. Per Leatha, pt's chemo is not a vesicant, only an irritant. Therefore, we will apply ice for 15-20mins Q4H for the next 24 hours. Leatha states she will come done to ER ASAP.

## 2020-01-15 NOTE — ED TRIAGE NOTES
Chief Complaint   Patient presents with   • Vascular Access Problem     port tilted and is painful, pump begun alarming around 0100     Pt is a cancer pt and is actively supposed to be getting chemo through her port. Pt reports that around 0100 port moved and became painful and chemo pump began alarming. Pt has chemo pumped stopped at this time. No swelling or redness noted and insertion site of port. Charge made aware. Pt to blue 17.

## 2020-01-16 ENCOUNTER — OUTPATIENT INFUSION SERVICES (OUTPATIENT)
Dept: ONCOLOGY | Facility: MEDICAL CENTER | Age: 80
End: 2020-01-16
Attending: INTERNAL MEDICINE
Payer: MEDICARE

## 2020-01-16 VITALS
BODY MASS INDEX: 28.83 KG/M2 | RESPIRATION RATE: 18 BRPM | DIASTOLIC BLOOD PRESSURE: 67 MMHG | SYSTOLIC BLOOD PRESSURE: 123 MMHG | HEART RATE: 105 BPM | OXYGEN SATURATION: 98 % | HEIGHT: 65 IN | TEMPERATURE: 98 F | WEIGHT: 173.06 LBS

## 2020-01-16 PROCEDURE — 96523 IRRIG DRUG DELIVERY DEVICE: CPT

## 2020-01-16 PROCEDURE — 700111 HCHG RX REV CODE 636 W/ 250 OVERRIDE (IP)

## 2020-01-16 RX ADMIN — HEPARIN 500 UNITS: 100 SYRINGE at 15:56

## 2020-01-17 NOTE — PROGRESS NOTES
Pt returns to [facility] for 5FU CADD pump disconnect.  Observed the CADD pump volume is complete. Port flushed with NS, brisk blood return and heparin locked.  Meade needle removed with tip intact.  Site covered with gauze.  Confirmed next appt with pt.  Pt dc home to self care.

## 2020-01-23 RX ORDER — DEXTROSE MONOHYDRATE 50 MG/ML
INJECTION, SOLUTION INTRAVENOUS CONTINUOUS
Status: CANCELLED | OUTPATIENT
Start: 2020-02-04

## 2020-01-23 RX ORDER — LIDOCAINE HYDROCHLORIDE 10 MG/ML
10 INJECTION, SOLUTION INFILTRATION; PERINEURAL ONCE
Status: CANCELLED | OUTPATIENT
Start: 2020-02-04

## 2020-01-23 RX ORDER — DEXAMETHASONE SODIUM PHOSPHATE 4 MG/ML
8 INJECTION, SOLUTION INTRA-ARTICULAR; INTRALESIONAL; INTRAMUSCULAR; INTRAVENOUS; SOFT TISSUE ONCE
Status: CANCELLED | OUTPATIENT
Start: 2020-02-04

## 2020-01-23 RX ORDER — PROCHLORPERAZINE MALEATE 10 MG
10 TABLET ORAL EVERY 6 HOURS PRN
Status: CANCELLED | OUTPATIENT
Start: 2020-02-04

## 2020-01-23 RX ORDER — ONDANSETRON 2 MG/ML
4 INJECTION INTRAMUSCULAR; INTRAVENOUS
Status: CANCELLED | OUTPATIENT
Start: 2020-02-04

## 2020-01-23 RX ORDER — ONDANSETRON 8 MG/1
8 TABLET, ORALLY DISINTEGRATING ORAL
Status: CANCELLED | OUTPATIENT
Start: 2020-02-04

## 2020-01-28 ENCOUNTER — APPOINTMENT (OUTPATIENT)
Dept: ONCOLOGY | Facility: MEDICAL CENTER | Age: 80
End: 2020-01-28
Attending: INTERNAL MEDICINE
Payer: MEDICARE

## 2020-01-30 ENCOUNTER — APPOINTMENT (OUTPATIENT)
Dept: ONCOLOGY | Facility: MEDICAL CENTER | Age: 80
End: 2020-01-30
Attending: INTERNAL MEDICINE
Payer: MEDICARE

## 2020-02-04 ENCOUNTER — OFFICE VISIT (OUTPATIENT)
Dept: HEMATOLOGY ONCOLOGY | Facility: MEDICAL CENTER | Age: 80
End: 2020-02-04
Payer: MEDICARE

## 2020-02-04 ENCOUNTER — OUTPATIENT INFUSION SERVICES (OUTPATIENT)
Dept: ONCOLOGY | Facility: MEDICAL CENTER | Age: 80
End: 2020-02-04
Attending: INTERNAL MEDICINE
Payer: MEDICARE

## 2020-02-04 VITALS
SYSTOLIC BLOOD PRESSURE: 157 MMHG | BODY MASS INDEX: 28.76 KG/M2 | DIASTOLIC BLOOD PRESSURE: 76 MMHG | HEART RATE: 97 BPM | RESPIRATION RATE: 18 BRPM | HEIGHT: 65 IN | OXYGEN SATURATION: 100 % | WEIGHT: 172.62 LBS | TEMPERATURE: 97 F

## 2020-02-04 VITALS
OXYGEN SATURATION: 100 % | SYSTOLIC BLOOD PRESSURE: 157 MMHG | RESPIRATION RATE: 18 BRPM | WEIGHT: 172.62 LBS | HEART RATE: 97 BPM | TEMPERATURE: 97 F | HEIGHT: 65 IN | BODY MASS INDEX: 28.76 KG/M2 | DIASTOLIC BLOOD PRESSURE: 76 MMHG

## 2020-02-04 VITALS
SYSTOLIC BLOOD PRESSURE: 134 MMHG | HEART RATE: 100 BPM | DIASTOLIC BLOOD PRESSURE: 68 MMHG | WEIGHT: 172.51 LBS | BODY MASS INDEX: 28.74 KG/M2 | TEMPERATURE: 98.2 F | HEIGHT: 65 IN | OXYGEN SATURATION: 97 % | RESPIRATION RATE: 18 BRPM

## 2020-02-04 DIAGNOSIS — C20 MALIGNANT NEOPLASM OF RECTUM (HCC): ICD-10-CM

## 2020-02-04 DIAGNOSIS — C20 MALIGNANT NEOPLASM OF RECTUM (HCC): Chronic | ICD-10-CM

## 2020-02-04 DIAGNOSIS — C78.7 SECONDARY MALIGNANT NEOPLASM OF LIVER (HCC): Chronic | ICD-10-CM

## 2020-02-04 LAB
ALBUMIN SERPL BCP-MCNC: 3.9 G/DL (ref 3.2–4.9)
ALBUMIN/GLOB SERPL: 1.4 G/DL
ALP SERPL-CCNC: 122 U/L (ref 30–99)
ALT SERPL-CCNC: 25 U/L (ref 2–50)
ANION GAP SERPL CALC-SCNC: 9 MMOL/L (ref 0–11.9)
AST SERPL-CCNC: 34 U/L (ref 12–45)
BASOPHILS # BLD AUTO: 1.3 % (ref 0–1.8)
BASOPHILS # BLD: 0.06 K/UL (ref 0–0.12)
BILIRUB SERPL-MCNC: 0.6 MG/DL (ref 0.1–1.5)
BUN SERPL-MCNC: 17 MG/DL (ref 8–22)
CALCIUM SERPL-MCNC: 9.7 MG/DL (ref 8.5–10.5)
CHLORIDE SERPL-SCNC: 103 MMOL/L (ref 96–112)
CO2 SERPL-SCNC: 24 MMOL/L (ref 20–33)
CREAT SERPL-MCNC: 0.91 MG/DL (ref 0.5–1.4)
EOSINOPHIL # BLD AUTO: 0.21 K/UL (ref 0–0.51)
EOSINOPHIL NFR BLD: 4.5 % (ref 0–6.9)
ERYTHROCYTE [DISTWIDTH] IN BLOOD BY AUTOMATED COUNT: 64.8 FL (ref 35.9–50)
GLOBULIN SER CALC-MCNC: 2.8 G/DL (ref 1.9–3.5)
GLUCOSE SERPL-MCNC: 111 MG/DL (ref 65–99)
HCT VFR BLD AUTO: 40 % (ref 37–47)
HGB BLD-MCNC: 12.2 G/DL (ref 12–16)
IMM GRANULOCYTES # BLD AUTO: 0.02 K/UL (ref 0–0.11)
IMM GRANULOCYTES NFR BLD AUTO: 0.4 % (ref 0–0.9)
LYMPHOCYTES # BLD AUTO: 1.29 K/UL (ref 1–4.8)
LYMPHOCYTES NFR BLD: 27.7 % (ref 22–41)
MCH RBC QN AUTO: 26.8 PG (ref 27–33)
MCHC RBC AUTO-ENTMCNC: 30.5 G/DL (ref 33.6–35)
MCV RBC AUTO: 87.7 FL (ref 81.4–97.8)
MONOCYTES # BLD AUTO: 0.6 K/UL (ref 0–0.85)
MONOCYTES NFR BLD AUTO: 12.9 % (ref 0–13.4)
NEUTROPHILS # BLD AUTO: 2.47 K/UL (ref 2–7.15)
NEUTROPHILS NFR BLD: 53.2 % (ref 44–72)
NRBC # BLD AUTO: 0 K/UL
NRBC BLD-RTO: 0 /100 WBC
PLATELET # BLD AUTO: 307 K/UL (ref 164–446)
PMV BLD AUTO: 10 FL (ref 9–12.9)
POTASSIUM SERPL-SCNC: 3.7 MMOL/L (ref 3.6–5.5)
PROT SERPL-MCNC: 6.7 G/DL (ref 6–8.2)
RBC # BLD AUTO: 4.56 M/UL (ref 4.2–5.4)
SODIUM SERPL-SCNC: 136 MMOL/L (ref 135–145)
WBC # BLD AUTO: 4.7 K/UL (ref 4.8–10.8)

## 2020-02-04 PROCEDURE — 82378 CARCINOEMBRYONIC ANTIGEN: CPT

## 2020-02-04 PROCEDURE — 96374 THER/PROPH/DIAG INJ IV PUSH: CPT | Mod: XU

## 2020-02-04 PROCEDURE — 700111 HCHG RX REV CODE 636 W/ 250 OVERRIDE (IP)

## 2020-02-04 PROCEDURE — 85025 COMPLETE CBC W/AUTO DIFF WBC: CPT

## 2020-02-04 PROCEDURE — 80053 COMPREHEN METABOLIC PANEL: CPT

## 2020-02-04 PROCEDURE — 99213 OFFICE O/P EST LOW 20 MIN: CPT | Performed by: NURSE PRACTITIONER

## 2020-02-04 PROCEDURE — G0498 CHEMO EXTEND IV INFUS W/PUMP: HCPCS

## 2020-02-04 PROCEDURE — A4212 NON CORING NEEDLE OR STYLET: HCPCS

## 2020-02-04 PROCEDURE — 700111 HCHG RX REV CODE 636 W/ 250 OVERRIDE (IP): Performed by: NURSE PRACTITIONER

## 2020-02-04 RX ORDER — DEXAMETHASONE SODIUM PHOSPHATE 4 MG/ML
8 INJECTION, SOLUTION INTRA-ARTICULAR; INTRALESIONAL; INTRAMUSCULAR; INTRAVENOUS; SOFT TISSUE ONCE
Status: COMPLETED | OUTPATIENT
Start: 2020-02-04 | End: 2020-02-04

## 2020-02-04 RX ADMIN — FLUOROURACIL 3630 MG: 50 INJECTION, SOLUTION INTRAVENOUS at 09:11

## 2020-02-04 RX ADMIN — HEPARIN 500 UNITS: 100 SYRINGE at 07:06

## 2020-02-04 RX ADMIN — DEXAMETHASONE SODIUM PHOSPHATE 8 MG: 4 INJECTION, SOLUTION INTRA-ARTICULAR; INTRALESIONAL; INTRAMUSCULAR; INTRAVENOUS; SOFT TISSUE at 08:49

## 2020-02-04 ASSESSMENT — ENCOUNTER SYMPTOMS
COUGH: 0
FEVER: 0
NAUSEA: 0
WEIGHT LOSS: 0
CONSTIPATION: 0
VOMITING: 0
CHILLS: 0
TINGLING: 0
MYALGIAS: 0
PALPITATIONS: 0
SHORTNESS OF BREATH: 1
HEADACHES: 0
DIARRHEA: 0
DIZZINESS: 0
INSOMNIA: 0

## 2020-02-04 ASSESSMENT — PAIN SCALES - GENERAL: PAINLEVEL: NO PAIN

## 2020-02-04 NOTE — PROGRESS NOTES
"Pharmacy Chemotherapy Verification    Patient Name: Karlene Walters   Dx: Colon CA        Cycle 101 ([place] cycle 93)    Previous treatment: 1/14/20    Protocol: 5-FU     IV over 2 hours on Day 1, followed by    IVP on Day 1, followed by                               -- 10/31/17: delete Leucovorin and 5-FU push d/t neutropenia per Dr. Hardy   Fluorouracil  continuous infusion over 46-48 hours                              -- 20% reduction (1920 mg/m²) to be continued starting C28 per C Alsop APRN   14 day cycles for 12 cycles (adjuvant) or until disease progression or unacceptable toxicity  NCCN Guidelines for Colon Cancer. V.2.2015.  Jay T, et al. Eur J Cancer.1999;35(9):1343-7.      Allergies: Codeine; Oxaliplatin; Pcn; Sulfa drugs; and Tape       /76   Pulse 97   Temp 36.1 °C (97 °F) (Temporal)   Resp 18   Ht 1.65 m (5' 4.96\")   Wt 78.3 kg (172 lb 9.9 oz)   LMP  (LMP Unknown)   SpO2 100%   BMI 28.76 kg/m²  Body surface area is 1.89 meters squared.    All lab results 2/4/20 within treatment parameters.       Fluorouracil (5-FU) 1920 mg/m² x 1.89m² = 3628.8mg              <10% difference, OK to treat with final dose = 3630mg    CIVI via CADD pump @ 1.6mL/hr over 46 hrs       DARIN Arroyo Pharm.D.                          "

## 2020-02-04 NOTE — PROGRESS NOTES
Subjective:      Karlene Walters is a 79 y.o. female who presents for Pre-Chemotherapy for metastatic rectal cancer to liver.         HPI    Patient seen today in follow up for metastatic rectal carcinoma to liver. She present unaccompanied for today's visit. Patient is scheduled to proceed with single agent 5FU today.     Patient recently had follow-up scans including CT chest, abdomen and pelvis which shows stable disease, if not slightly improvement in liver mass.  Patient has been on single agent 5-FU and tolerating it very well with good control of the disease.  She has been on this medication every 2 weeks and is scheduled for cycle #91 today.  After further discussion with locum physician, Dr. Landon, plan is to continue with single 5-FU however will spread out the treatments to every 3 weeks versus every 2 weeks.  We have had this discussion multiple times in the past and this was discussed with Dr. Steele previously as well.  Patient is now receiving treatment every 3 weeks.    She stated she is doing well.  She stated that she thinks the shortness of breath is a little bit better as she has been able to get outside and work outside in her garden.  She stated with the extra week off she has been able to do a little more and be more productive and overall feeling better.  She stated she is still taking Lasix about once per week but only if needed.  She has noticed a decrease in her urine output unless she takes Lasix but no pain or burning or discoloration of the urine.  Overall she feels great.  She does have ostomy output which is normal per her routine.    With last cycle patient did have incident with port needle dislodging during infusion.  It does appear that the infusion pump did stop but patient was seen in the emergency department and there was no evidence of extravasation.  We did resume the chemotherapy and finish the cycle and patient tolerated well.    Allergies   Allergen Reactions   •  "Oxaliplatin Anaphylaxis   • Codeine      \"gets drunk\"   • Pcn [Penicillins] Itching   • Sulfa Drugs Itching   • Tape Rash     PAPER TAPE OK     Current Outpatient Medications on File Prior to Visit   Medication Sig Dispense Refill   • potassium chloride ER (KLOR-CON) 10 MEQ tablet      • albuterol (PROAIR HFA) 108 (90 Base) MCG/ACT Aero Soln inhalation aerosol Inhale 2 Puffs by mouth every four hours as needed for Shortness of Breath (wheezing). 1 Inhaler 11   • XARELTO 10 MG Tab tablet TAKE 1 TABLET EVERY DAY 90 Tab 1   • furosemide (LASIX) 40 MG Tab Take 1 Tab by mouth every day. 90 Tab 3   • lidocaine-prilocaine (EMLA) 2.5-2.5 % Cream APPLY A THICK FILM OVER THE PORT ACCESS SITE PRIOR TO ACCESS 30 g 3   • ondansetron (ZOFRAN) 4 MG Tab tablet Take 1 Tab by mouth as needed. 30 Tab 3   • Tiotropium Bromide-Olodaterol (STIOLTO RESPIMAT) 2.5-2.5 MCG/ACT Aero Soln Take 2 Puffs by mouth every day. 1 Inhaler 11   • metronidazole (METROCREAM) 0.75 % cream Apply 1 Each to affected area(s) 2 times a day.     • loperamide (IMODIUM) 2 MG Cap Take 2 mg by mouth 4 times a day as needed for Diarrhea.     • cyanocobalamin (VITAMIN B-12) 500 MCG Tab Take 1,000 mcg by mouth every day.     • Multiple Vitamins-Minerals (CENTRUM SILVER PO) Take 1 Tab by mouth every day.     • Cholecalciferol (VITAMIN D3) 400 UNITS CAPS Take 1 Cap by mouth every day.     • loratadine (CLARITIN) 10 MG TABS Take 10 mg by mouth every day. Indications: Hayfever       No current facility-administered medications on file prior to visit.        Review of Systems   Constitutional: Negative for chills, fever, malaise/fatigue and weight loss.   Respiratory: Positive for shortness of breath (better per patient this week). Negative for cough.    Cardiovascular: Negative for chest pain and palpitations.   Gastrointestinal: Negative for constipation, diarrhea, nausea and vomiting.        Ostomy output is normal per her routine   Genitourinary: Negative for dysuria. " "       Increased time to urinate   Musculoskeletal: Negative for myalgias.   Neurological: Negative for dizziness, tingling and headaches.   Psychiatric/Behavioral: The patient does not have insomnia.           Objective:     /68 (BP Location: Right arm, Patient Position: Sitting, BP Cuff Size: Adult)   Pulse 100   Temp 36.8 °C (98.2 °F) (Temporal)   Resp 18   Ht 1.651 m (5' 5\")   Wt 78.3 kg (172 lb 8.2 oz)   LMP  (LMP Unknown)   SpO2 97%   BMI 28.71 kg/m²      Physical Exam  Vitals signs reviewed.   Constitutional:       General: She is not in acute distress.     Appearance: Normal appearance. She is not diaphoretic.   HENT:      Head: Normocephalic and atraumatic.      Mouth/Throat:      Mouth: Mucous membranes are dry.      Pharynx: Oropharynx is clear. No oropharyngeal exudate or posterior oropharyngeal erythema.   Cardiovascular:      Rate and Rhythm: Normal rate and regular rhythm.      Pulses: Normal pulses.      Heart sounds: Normal heart sounds. No murmur. No friction rub. No gallop.    Pulmonary:      Effort: Pulmonary effort is normal. No respiratory distress.      Breath sounds: Normal breath sounds. No wheezing.   Abdominal:      General: Bowel sounds are normal. There is no distension.      Palpations: Abdomen is soft.      Tenderness: There is no tenderness.   Musculoskeletal: Normal range of motion.         General: No swelling or tenderness.   Skin:     General: Skin is warm and dry.   Neurological:      Mental Status: She is alert and oriented to person, place, and time.   Psychiatric:         Mood and Affect: Mood normal.         Behavior: Behavior normal.       Results for KANG HALL (MRN [id number])    Ref. Range 2/4/2020 07:10   WBC Latest Ref Range: 4.8 - 10.8 K/uL 4.7 (L)   RBC Latest Ref Range: 4.20 - 5.40 M/uL 4.56   Hemoglobin Latest Ref Range: 12.0 - 16.0 g/dL 12.2   Hematocrit Latest Ref Range: 37.0 - 47.0 % 40.0   MCV Latest Ref Range: 81.4 - 97.8 fL 87.7   MCH " Latest Ref Range: 27.0 - 33.0 pg 26.8 (L)   MCHC Latest Ref Range: 33.6 - 35.0 g/dL 30.5 (L)   RDW Latest Ref Range: 35.9 - 50.0 fL 64.8 (H)   Platelet Count Latest Ref Range: 164 - 446 K/uL 307   MPV Latest Ref Range: 9.0 - 12.9 fL 10.0   Neutrophils-Polys Latest Ref Range: 44.00 - 72.00 % 53.20   Neutrophils (Absolute) Latest Ref Range: 2.00 - 7.15 K/uL 2.47   Lymphocytes Latest Ref Range: 22.00 - 41.00 % 27.70   Lymphs (Absolute) Latest Ref Range: 1.00 - 4.80 K/uL 1.29   Monocytes Latest Ref Range: 0.00 - 13.40 % 12.90   Monos (Absolute) Latest Ref Range: 0.00 - 0.85 K/uL 0.60   Eosinophils Latest Ref Range: 0.00 - 6.90 % 4.50   Eos (Absolute) Latest Ref Range: 0.00 - 0.51 K/uL 0.21   Basophils Latest Ref Range: 0.00 - 1.80 % 1.30   Baso (Absolute) Latest Ref Range: 0.00 - 0.12 K/uL 0.06   Immature Granulocytes Latest Ref Range: 0.00 - 0.90 % 0.40   Immature Granulocytes (abs) Latest Ref Range: 0.00 - 0.11 K/uL 0.02   Nucleated RBC Latest Units: /100 WBC 0.00   NRBC (Absolute) Latest Units: K/uL 0.00   Sodium Latest Ref Range: 135 - 145 mmol/L 136   Potassium Latest Ref Range: 3.6 - 5.5 mmol/L 3.7   Chloride Latest Ref Range: 96 - 112 mmol/L 103   Co2 Latest Ref Range: 20 - 33 mmol/L 24   Anion Gap Latest Ref Range: 0.0 - 11.9  9.0   Glucose Latest Ref Range: 65 - 99 mg/dL 111 (H)   Bun Latest Ref Range: 8 - 22 mg/dL 17   Creatinine Latest Ref Range: 0.50 - 1.40 mg/dL 0.91   GFR If  Latest Ref Range: >60 mL/min/1.73 m 2 >60   GFR If Non  Latest Ref Range: >60 mL/min/1.73 m 2 60   Calcium Latest Ref Range: 8.5 - 10.5 mg/dL 9.7   AST(SGOT) Latest Ref Range: 12 - 45 U/L 34   ALT(SGPT) Latest Ref Range: 2 - 50 U/L 25   Alkaline Phosphatase Latest Ref Range: 30 - 99 U/L 122 (H)   Total Bilirubin Latest Ref Range: 0.1 - 1.5 mg/dL 0.6   Albumin Latest Ref Range: 3.2 - 4.9 g/dL 3.9   Total Protein Latest Ref Range: 6.0 - 8.2 g/dL 6.7   Globulin Latest Ref Range: 1.9 - 3.5 g/dL 2.8   A-G  Ratio Latest Units: g/dL 1.4          Assessment/Plan:       1. Malignant neoplasm of rectum (HCC)     2. Secondary malignant neoplasm of liver (HCC)       Plan  1.  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  She has been on single 5-FU for quite some time and is scheduled to proceed with cycle #91.  Her most recent CT scan on December 30, 2019 shows stable if not improvement in her disease.  Based on that we have extended out her treatment to every 3 weeks versus every 2 weeks.  Clinically she is stable and I did review her CBC, CMP and labs are appropriate to proceed with treatment as planned.    Per Dr. Landon, patient to have follow-up CT scan in March.    2.  Patient to follow-up in the clinic in 3 weeks or sooner if needed.

## 2020-02-04 NOTE — PROGRESS NOTES
Chemotherapy Verification - PRIMARY RN      Height = 165 cm  Weight = 78.3 kg  BSA = 1.9 m2       Medication: Fluorouracil  Dose: 1,920 mg/m2  Calculated Dose: 3,648 mg                             (In mg/m2, AUC, mg/kg)       I confirm this process was performed independently with the BSA and all final chemotherapy dosing calculations congruent.  Any discrepancies of 10% or greater have been addressed with the chemotherapy pharmacist. The resolution of the discrepancy has been documented in the EPIC progress notes.

## 2020-02-04 NOTE — PROGRESS NOTES
Pt arrived to [facility], ambulatory, for 5FU. Pt voices no complaints. Port (accessed this morning) flushed per policy, positive blood return noted. Labs reviewed, pt within parameters to treat today. Dexamethasone administered with no s/sx of adverse reaction. 5FU CADD pump connected with no complications. Pt left [facility] with no s/sx of distress. Follow up appointment confirmed.

## 2020-02-04 NOTE — PROGRESS NOTES
Chemotherapy Verification - SECONDARY RN       Height = 165.1 cm  Weight = 78.3 kg  BSA = 1.89 m2       Medication: Adrucil  Dose: 1,920 mg/m2  Calculated Dose: 3,628.8 mg                             (In mg/m2, AUC, mg/kg)         I confirm that this process was performed independently.

## 2020-02-04 NOTE — PROGRESS NOTES
"Pharmacy Chemotherapy Verification Note:       Dx: Colon Ca      Protocol: 5-FU +      *Dosing Reference*                                    -- 10/31/17: delete Leucovorin and 5-FU push d/t neutropenia per Dr. Hardy   Fluorouracil 2400 mg/m² continuous infusion over 46-48 hours                              -- 20% reduction (1920 mg/m²) to be continued starting C28 per C Alsop APRN   14 day cycles for 12 cycles (adjuvant) or until disease progression or unacceptable toxicity  NCCN Guidelines for Colon Cancer. V.2.2015.  Jay T, et al. Eur J Cancer.1999;35(9):1343-7.     Allergies:  Oxaliplatin; Codeine; Pcn [penicillins]; Sulfa drugs; and Tape     /76   Pulse 97   Temp 36.1 °C (97 °F) (Temporal)   Resp 18   Ht 1.65 m (5' 4.96\")   Wt 78.3 kg (172 lb 9.9 oz)   LMP  (LMP Unknown)   SpO2 100%   BMI 28.76 kg/m²  Body surface area is 1.89 meters squared.    Labs 2/4/20:  ANC~ 2470 Plt = 307k   Hgb = 12.2     SCr = 0.91 mg/dL CrCl ~ 62 mL/min   AST/ALT/ALK Phos = 34/25/122 TBili = 0.6     Drug Order   (Drug name, dose, route, IV Fluid & volume, frequency, number of doses) Cycle 101  Previous treatment: 1/14/20     Medication = Fluorouracil (5-FU)  Base Dose = 1920 mg/m²  Calc Dose:Base Dose x 1.89 m² = 3628.8 mg  Final Dose = 3630 mg  Route = CIVI via CADD pump  Fluid & Volume = 72.6 mL (+ 3mL overfill = 75.6 mL)  Admin Duration = Over 46 hours at 1.6 mL/hour   Via CADD pump       <10% difference, okay to treat with final written dose     By my signature below, I confirm this process was performed independently with the BSA and all final chemotherapy dosing calculations congruent. I have reviewed the above chemotherapy order and that my calculation of the final dose and BSA (when applicable) corroborate those calculations of the  pharmacist.     Asha Krishnan, PharmD    "

## 2020-02-05 LAB — CEA SERPL-MCNC: 1.3 NG/ML (ref 0–3)

## 2020-02-06 ENCOUNTER — OUTPATIENT INFUSION SERVICES (OUTPATIENT)
Dept: ONCOLOGY | Facility: MEDICAL CENTER | Age: 80
End: 2020-02-06
Attending: INTERNAL MEDICINE
Payer: MEDICARE

## 2020-02-06 VITALS
BODY MASS INDEX: 28.76 KG/M2 | RESPIRATION RATE: 18 BRPM | OXYGEN SATURATION: 97 % | WEIGHT: 172.62 LBS | HEART RATE: 97 BPM | TEMPERATURE: 97.2 F | SYSTOLIC BLOOD PRESSURE: 122 MMHG | HEIGHT: 65 IN | DIASTOLIC BLOOD PRESSURE: 62 MMHG

## 2020-02-06 DIAGNOSIS — C20 MALIGNANT NEOPLASM OF RECTUM (HCC): ICD-10-CM

## 2020-02-06 PROCEDURE — 96523 IRRIG DRUG DELIVERY DEVICE: CPT

## 2020-02-06 PROCEDURE — 700111 HCHG RX REV CODE 636 W/ 250 OVERRIDE (IP)

## 2020-02-06 RX ADMIN — HEPARIN 500 UNITS: 100 SYRINGE at 11:45

## 2020-02-06 NOTE — PROGRESS NOTES
Patient presents to clinic for 5FU CADD pump deaccess. Pt tolerated chemotherapy well with no complications or concerns. Pump disconnected, total volume delivered = 73.2 mL. Port flushed per protocol, needle removed intact. Pt discharged in stable, ambulatory condition.

## 2020-02-11 DIAGNOSIS — J43.9 PULMONARY EMPHYSEMA, UNSPECIFIED EMPHYSEMA TYPE (HCC): ICD-10-CM

## 2020-02-11 RX ORDER — TIOTROPIUM BROMIDE AND OLODATEROL 3.124; 2.736 UG/1; UG/1
SPRAY, METERED RESPIRATORY (INHALATION)
Qty: 1 INHALER | Refills: 0 | Status: ON HOLD
Start: 2020-02-11 | End: 2020-05-28

## 2020-02-12 NOTE — TELEPHONE ENCOUNTER
Have we ever prescribed this med? Yes.  If yes, what date? 11/26/18(Faisal)    Last OV: 07/17/19 with Asha MORALES  Follow up in 6 mos to check symptoms, sooner if needed. If symptoms worsen, we will update HRCT    Next OV: No Pending appt.     DX: Pulmonary emphysema, unspecified emphysema type (HCC) (J43.9).    Medications:   Requested Prescriptions     Pending Prescriptions Disp Refills   • STIOLTO RESPIMAT 2.5-2.5 MCG/ACT Aero Soln [Pharmacy Med Name: STIOLTO RESPIMAT INHAL SPRY 2.5-2.5 AERO] 4 g 0     Sig: TAKE 2 PUFFS EVERY DAY

## 2020-02-13 ENCOUNTER — TELEPHONE (OUTPATIENT)
Dept: HEMATOLOGY ONCOLOGY | Facility: MEDICAL CENTER | Age: 80
End: 2020-02-13

## 2020-02-13 NOTE — TELEPHONE ENCOUNTER
Phone Number Called: [phone]    Call outcome: Spoke to patient regarding message below.    Message: regarding message from ANDREW Krishnan. Patient expressed understanding and agreement in continuing the current plan of care.

## 2020-02-13 NOTE — TELEPHONE ENCOUNTER
----- Message from ARTURO Krishnan sent at 2/12/2020  4:06 PM PST -----  Can you let the patient know that I did talk with Dr. Steele and we are not going to make any changes to her dose of her 5-FU and continue with every 3 weeks.  She and I talked about that briefly and I did confirm with Dr. Steele that he agreed with what I told her is no change in the dose.

## 2020-02-24 RX ORDER — LIDOCAINE HYDROCHLORIDE 10 MG/ML
10 INJECTION, SOLUTION INFILTRATION; PERINEURAL ONCE
Status: CANCELLED | OUTPATIENT
Start: 2020-02-25

## 2020-02-24 RX ORDER — DEXTROSE MONOHYDRATE 50 MG/ML
INJECTION, SOLUTION INTRAVENOUS CONTINUOUS
Status: CANCELLED | OUTPATIENT
Start: 2020-02-25

## 2020-02-24 RX ORDER — ONDANSETRON 2 MG/ML
4 INJECTION INTRAMUSCULAR; INTRAVENOUS
Status: CANCELLED | OUTPATIENT
Start: 2020-02-25

## 2020-02-24 RX ORDER — PROCHLORPERAZINE MALEATE 10 MG
10 TABLET ORAL EVERY 6 HOURS PRN
Status: CANCELLED | OUTPATIENT
Start: 2020-02-25

## 2020-02-24 RX ORDER — ONDANSETRON 8 MG/1
8 TABLET, ORALLY DISINTEGRATING ORAL
Status: CANCELLED | OUTPATIENT
Start: 2020-02-25

## 2020-02-24 RX ORDER — DEXAMETHASONE SODIUM PHOSPHATE 4 MG/ML
8 INJECTION, SOLUTION INTRA-ARTICULAR; INTRALESIONAL; INTRAMUSCULAR; INTRAVENOUS; SOFT TISSUE ONCE
Status: CANCELLED | OUTPATIENT
Start: 2020-02-25

## 2020-02-25 ENCOUNTER — OFFICE VISIT (OUTPATIENT)
Dept: HEMATOLOGY ONCOLOGY | Facility: MEDICAL CENTER | Age: 80
End: 2020-02-25
Payer: MEDICARE

## 2020-02-25 ENCOUNTER — OUTPATIENT INFUSION SERVICES (OUTPATIENT)
Dept: ONCOLOGY | Facility: MEDICAL CENTER | Age: 80
End: 2020-02-25
Attending: INTERNAL MEDICINE
Payer: MEDICARE

## 2020-02-25 VITALS
RESPIRATION RATE: 18 BRPM | OXYGEN SATURATION: 100 % | TEMPERATURE: 97.5 F | SYSTOLIC BLOOD PRESSURE: 134 MMHG | HEART RATE: 98 BPM | HEIGHT: 65 IN | DIASTOLIC BLOOD PRESSURE: 68 MMHG | WEIGHT: 171.74 LBS | BODY MASS INDEX: 28.61 KG/M2

## 2020-02-25 VITALS
HEART RATE: 98 BPM | SYSTOLIC BLOOD PRESSURE: 134 MMHG | TEMPERATURE: 97.5 F | HEIGHT: 65 IN | WEIGHT: 171.74 LBS | BODY MASS INDEX: 28.61 KG/M2 | OXYGEN SATURATION: 100 % | RESPIRATION RATE: 18 BRPM | DIASTOLIC BLOOD PRESSURE: 68 MMHG

## 2020-02-25 VITALS
OXYGEN SATURATION: 99 % | BODY MASS INDEX: 28.6 KG/M2 | TEMPERATURE: 97.9 F | DIASTOLIC BLOOD PRESSURE: 72 MMHG | SYSTOLIC BLOOD PRESSURE: 120 MMHG | HEART RATE: 92 BPM | HEIGHT: 65 IN | WEIGHT: 171.63 LBS | RESPIRATION RATE: 18 BRPM

## 2020-02-25 DIAGNOSIS — C78.7 SECONDARY MALIGNANT NEOPLASM OF LIVER (HCC): Chronic | ICD-10-CM

## 2020-02-25 DIAGNOSIS — C20 MALIGNANT NEOPLASM OF RECTUM (HCC): Chronic | ICD-10-CM

## 2020-02-25 DIAGNOSIS — C20 MALIGNANT NEOPLASM OF RECTUM (HCC): ICD-10-CM

## 2020-02-25 DIAGNOSIS — Z79.899 ENCOUNTER FOR LONG-TERM CURRENT USE OF HIGH RISK MEDICATION: ICD-10-CM

## 2020-02-25 DIAGNOSIS — R94.4 DECREASED GFR: ICD-10-CM

## 2020-02-25 DIAGNOSIS — C18.9 MALIGNANT NEOPLASM OF COLON, UNSPECIFIED PART OF COLON (HCC): ICD-10-CM

## 2020-02-25 DIAGNOSIS — N28.9 RENAL INSUFFICIENCY: ICD-10-CM

## 2020-02-25 DIAGNOSIS — Z86.711 HISTORY OF PULMONARY EMBOLISM: Chronic | ICD-10-CM

## 2020-02-25 DIAGNOSIS — C78.7 SECONDARY MALIGNANT NEOPLASM OF LIVER (HCC): ICD-10-CM

## 2020-02-25 DIAGNOSIS — Z79.01 CURRENT USE OF LONG TERM ANTICOAGULATION: Chronic | ICD-10-CM

## 2020-02-25 LAB
ALBUMIN SERPL BCP-MCNC: 4 G/DL (ref 3.2–4.9)
ALBUMIN/GLOB SERPL: 1.5 G/DL
ALP SERPL-CCNC: 124 U/L (ref 30–99)
ALT SERPL-CCNC: 24 U/L (ref 2–50)
ANION GAP SERPL CALC-SCNC: 10 MMOL/L (ref 0–11.9)
ANISOCYTOSIS BLD QL SMEAR: ABNORMAL
AST SERPL-CCNC: 33 U/L (ref 12–45)
BASOPHILS # BLD AUTO: 0 % (ref 0–1.8)
BASOPHILS # BLD: 0 K/UL (ref 0–0.12)
BILIRUB SERPL-MCNC: 0.7 MG/DL (ref 0.1–1.5)
BUN SERPL-MCNC: 17 MG/DL (ref 8–22)
CALCIUM SERPL-MCNC: 9.6 MG/DL (ref 8.5–10.5)
CEA SERPL-MCNC: 1.2 NG/ML (ref 0–3)
CHLORIDE SERPL-SCNC: 103 MMOL/L (ref 96–112)
CO2 SERPL-SCNC: 22 MMOL/L (ref 20–33)
CREAT SERPL-MCNC: 1.15 MG/DL (ref 0.5–1.4)
EOSINOPHIL # BLD AUTO: 0.16 K/UL (ref 0–0.51)
EOSINOPHIL NFR BLD: 5.3 % (ref 0–6.9)
ERYTHROCYTE [DISTWIDTH] IN BLOOD BY AUTOMATED COUNT: 64.4 FL (ref 35.9–50)
GLOBULIN SER CALC-MCNC: 2.7 G/DL (ref 1.9–3.5)
GLUCOSE SERPL-MCNC: 155 MG/DL (ref 65–99)
HCT VFR BLD AUTO: 39.8 % (ref 37–47)
HGB BLD-MCNC: 12.1 G/DL (ref 12–16)
LYMPHOCYTES # BLD AUTO: 0.76 K/UL (ref 1–4.8)
LYMPHOCYTES NFR BLD: 24.5 % (ref 22–41)
MANUAL DIFF BLD: NORMAL
MCH RBC QN AUTO: 26.9 PG (ref 27–33)
MCHC RBC AUTO-ENTMCNC: 30.4 G/DL (ref 33.6–35)
MCV RBC AUTO: 88.6 FL (ref 81.4–97.8)
MICROCYTES BLD QL SMEAR: ABNORMAL
MONOCYTES # BLD AUTO: 0.38 K/UL (ref 0–0.85)
MONOCYTES NFR BLD AUTO: 12.3 % (ref 0–13.4)
MORPHOLOGY BLD-IMP: NORMAL
NEUTROPHILS # BLD AUTO: 1.79 K/UL (ref 2–7.15)
NEUTROPHILS NFR BLD: 57.9 % (ref 44–72)
NRBC # BLD AUTO: 0 K/UL
NRBC BLD-RTO: 0 /100 WBC
PLATELET # BLD AUTO: 263 K/UL (ref 164–446)
PLATELET BLD QL SMEAR: NORMAL
PMV BLD AUTO: 10.1 FL (ref 9–12.9)
POLYCHROMASIA BLD QL SMEAR: NORMAL
POTASSIUM SERPL-SCNC: 3.6 MMOL/L (ref 3.6–5.5)
PROT SERPL-MCNC: 6.7 G/DL (ref 6–8.2)
RBC # BLD AUTO: 4.49 M/UL (ref 4.2–5.4)
RBC BLD AUTO: PRESENT
SODIUM SERPL-SCNC: 135 MMOL/L (ref 135–145)
VARIANT LYMPHS BLD QL SMEAR: NORMAL
WBC # BLD AUTO: 3.1 K/UL (ref 4.8–10.8)

## 2020-02-25 PROCEDURE — 80053 COMPREHEN METABOLIC PANEL: CPT

## 2020-02-25 PROCEDURE — 700111 HCHG RX REV CODE 636 W/ 250 OVERRIDE (IP): Performed by: NURSE PRACTITIONER

## 2020-02-25 PROCEDURE — 82378 CARCINOEMBRYONIC ANTIGEN: CPT

## 2020-02-25 PROCEDURE — G0498 CHEMO EXTEND IV INFUS W/PUMP: HCPCS

## 2020-02-25 PROCEDURE — 85027 COMPLETE CBC AUTOMATED: CPT

## 2020-02-25 PROCEDURE — 96374 THER/PROPH/DIAG INJ IV PUSH: CPT | Mod: XU

## 2020-02-25 PROCEDURE — 96375 TX/PRO/DX INJ NEW DRUG ADDON: CPT | Mod: XU

## 2020-02-25 PROCEDURE — 85007 BL SMEAR W/DIFF WBC COUNT: CPT

## 2020-02-25 PROCEDURE — A4212 NON CORING NEEDLE OR STYLET: HCPCS

## 2020-02-25 PROCEDURE — 99214 OFFICE O/P EST MOD 30 MIN: CPT | Performed by: NURSE PRACTITIONER

## 2020-02-25 PROCEDURE — 700111 HCHG RX REV CODE 636 W/ 250 OVERRIDE (IP)

## 2020-02-25 RX ORDER — DEXAMETHASONE SODIUM PHOSPHATE 4 MG/ML
8 INJECTION, SOLUTION INTRA-ARTICULAR; INTRALESIONAL; INTRAMUSCULAR; INTRAVENOUS; SOFT TISSUE ONCE
Status: COMPLETED | OUTPATIENT
Start: 2020-02-25 | End: 2020-02-25

## 2020-02-25 RX ADMIN — FLUOROURACIL 3630 MG: 50 INJECTION, SOLUTION INTRAVENOUS at 09:41

## 2020-02-25 RX ADMIN — DEXAMETHASONE SODIUM PHOSPHATE 8 MG: 4 INJECTION, SOLUTION INTRA-ARTICULAR; INTRALESIONAL; INTRAMUSCULAR; INTRAVENOUS; SOFT TISSUE at 08:45

## 2020-02-25 RX ADMIN — HEPARIN 500 UNITS: 100 SYRINGE at 07:11

## 2020-02-25 ASSESSMENT — ENCOUNTER SYMPTOMS
NAUSEA: 1
CONSTIPATION: 0
TINGLING: 0
DIZZINESS: 0
SHORTNESS OF BREATH: 1
DIARRHEA: 1
PALPITATIONS: 1
COUGH: 1
HEADACHES: 0
VOMITING: 0
FEVER: 0
CHILLS: 0
WEIGHT LOSS: 0
MYALGIAS: 1
WHEEZING: 0

## 2020-02-25 ASSESSMENT — PATIENT HEALTH QUESTIONNAIRE - PHQ9: CLINICAL INTERPRETATION OF PHQ2 SCORE: 0

## 2020-02-25 ASSESSMENT — PAIN SCALES - GENERAL: PAINLEVEL: NO PAIN

## 2020-02-25 NOTE — PROGRESS NOTES
Pt amulatory to [facility] for C91 D1 of fluorouracil in a CADD pump for rectal cancer.  Pt w/ no s/s of infection, pt has no complaints at this time.  Pt presents w/ PORT already accessed from lab draw this morning.  Lab results w/n parameters for tx today.  Decadron administered by IVP w/ no adverse reactions.  CADD pump connected to PORT, all clamps opened, pump confirmed to be in RUN mode, placed in pt's konrad pack.  Pt left on foot in NAD.  Confirmed pt's appt for pump d/c.

## 2020-02-25 NOTE — PROGRESS NOTES
Patient presents for lab draw prior to chemotherapy later this morning. Port accessed using sterile technique, flushes well with blood drawn as ordered. Port flushed per protocol and left in place. Patient released to MD office in no acute distress.

## 2020-02-25 NOTE — PROGRESS NOTES
"Pharmacy Chemotherapy Verification    Patient Name: Karlene Walters   Dx: Colon CA        Cycle 102 ([place] cycle 94)    Previous treatment: 2/4/20    Protocol: 5-FU     IV over 2 hours on Day 1, followed by    IVP on Day 1, followed by                               -- 10/31/17: delete Leucovorin and 5-FU push d/t neutropenia per Dr. Hardy   Fluorouracil  continuous infusion over 46-48 hours                              -- 20% reduction (1920 mg/m²) to be continued starting C28 per C Alsop APRN   14 day cycles for 12 cycles (adjuvant) or until disease progression or unacceptable toxicity  NCCN Guidelines for Colon Cancer. V.2.2015.  Jay T, et al. Eur J Cancer.1999;35(9):1343-7.      Allergies: Codeine; Oxaliplatin; Pcn; Sulfa drugs; and Tape       /68   Pulse 98   Temp 36.4 °C (97.5 °F) (Temporal)   Resp 18   Ht 1.65 m (5' 4.96\")   Wt 77.9 kg (171 lb 11.8 oz)   LMP  (LMP Unknown)   SpO2 100%   BMI 28.61 kg/m²  Body surface area is 1.89 meters squared.    All lab results 2/25/20 within treatment parameters.       Fluorouracil (5-FU) 1920 mg/m² x 1.89m² = 3628mg              <10% difference, OK to treat with final dose = 3630mg    CIVI via CADD pump @ 1.6mL/hr over 46 hrs       DARIN Arroyo Pharm.D.                          "

## 2020-02-25 NOTE — PROGRESS NOTES
"Pharmacy Chemotherapy Verification Note:       Dx: Colon Ca      Protocol: 5-FU +      *Dosing Reference*                                    -- 10/31/17: delete Leucovorin and 5-FU push d/t neutropenia per Dr. Hardy   Fluorouracil 2400 mg/m² continuous infusion over 46-48 hours                              -- 20% reduction (1920 mg/m²) to be continued starting C28 per C Alsop APRN   14 day cycles for 12 cycles (adjuvant) or until disease progression or unacceptable toxicity  NCCN Guidelines for Colon Cancer. V.2.2015.  Jay T, et al. Eur J Cancer.1999;35(9):1343-7.     Allergies:  Oxaliplatin; Codeine; Pcn [penicillins]; Sulfa drugs; and Tape       /68   Pulse 98   Temp 36.4 °C (97.5 °F) (Temporal)   Resp 18   Ht 1.65 m (5' 4.96\")   Wt 77.9 kg (171 lb 11.8 oz)   LMP  (LMP Unknown)   SpO2 100%   BMI 28.61 kg/m²  Body surface area is 1.89 meters squared.    Labs 02/25/20:  ANC~ 1800 Plt = 263k   Hgb = 12.1   SCr = 1.15 mg/dL CrCl ~ 48 mL/min   AST/ALT/ALK Phos = 33/24/124 TBili = 0.7     Drug Order   (Drug name, dose, route, IV Fluid & volume, frequency, number of doses) Cycle 102 ([place] 94)   Previous treatment: 02/04/20     Medication = Fluorouracil (5-FU)  Base Dose = 1920 mg/m²  Calc Dose:Base Dose x 1.89 m² = 3629 mg  Final Dose = 3630 mg  Route = CIVI via CADD pump  Fluid & Volume = 72.6 mL (+ 3mL overfill = 75.6 mL)  Admin Duration = Over 46 hours at 1.6 mL/hour   Via CADD pump       <10% difference, okay to treat with final dose     By my signature below, I confirm this process was performed independently with the BSA and all final chemotherapy dosing calculations congruent. I have reviewed the above chemotherapy order and that my calculation of the final dose and BSA (when applicable) corroborate those calculations of the  pharmacist.     Lalit Andrade, PharmD, BCOP    "

## 2020-02-25 NOTE — PROGRESS NOTES
Chemotherapy Verification - PRIMARY RN      Height = 1.65 m  Weight = 77.9 kg  BSA = 1.89 m2       Medication: fluorouracil CADD pump  Dose: 1920 mg/m2  Calculated Dose: 3628.8 mg over 46 hours                            (In mg/m2, AUC, mg/kg)     I confirm this process was performed independently with the BSA and all final chemotherapy dosing calculations congruent.  Any discrepancies of 10% or greater have been addressed with the chemotherapy pharmacist. The resolution of the discrepancy has been documented in the EPIC progress notes.

## 2020-02-25 NOTE — PROGRESS NOTES
Chemotherapy Verification - SECONDARY RN       Height = 165 cm  Weight = 77.9 kg  BSA = 1.89 m2       Medication: Adrucil  Dose: 1,920 mg/m2  Calculated Dose: 3,628.8 mg over 46 hours                             (In mg/m2, AUC, mg/kg)         I confirm that this process was performed independently.

## 2020-02-26 ENCOUNTER — OFFICE VISIT (OUTPATIENT)
Dept: MEDICAL GROUP | Facility: MEDICAL CENTER | Age: 80
End: 2020-02-26
Payer: MEDICARE

## 2020-02-26 VITALS
DIASTOLIC BLOOD PRESSURE: 62 MMHG | OXYGEN SATURATION: 97 % | HEIGHT: 65 IN | BODY MASS INDEX: 29.12 KG/M2 | WEIGHT: 174.8 LBS | SYSTOLIC BLOOD PRESSURE: 116 MMHG | TEMPERATURE: 98.2 F | HEART RATE: 68 BPM

## 2020-02-26 DIAGNOSIS — D64.9 ANEMIA, UNSPECIFIED TYPE: ICD-10-CM

## 2020-02-26 DIAGNOSIS — K76.9 LIVER DISEASE: ICD-10-CM

## 2020-02-26 DIAGNOSIS — E78.5 HYPERLIPIDEMIA, UNSPECIFIED HYPERLIPIDEMIA TYPE: ICD-10-CM

## 2020-02-26 DIAGNOSIS — Z86.711 HISTORY OF PULMONARY EMBOLISM: Chronic | ICD-10-CM

## 2020-02-26 DIAGNOSIS — E04.1 THYROID NODULE: ICD-10-CM

## 2020-02-26 DIAGNOSIS — R73.9 HYPERGLYCEMIA: ICD-10-CM

## 2020-02-26 DIAGNOSIS — R74.8 ELEVATED ALKALINE PHOSPHATASE LEVEL: ICD-10-CM

## 2020-02-26 DIAGNOSIS — R19.7 DIARRHEA, UNSPECIFIED TYPE: ICD-10-CM

## 2020-02-26 DIAGNOSIS — R06.02 SHORTNESS OF BREATH: ICD-10-CM

## 2020-02-26 DIAGNOSIS — C78.7 SECONDARY MALIGNANT NEOPLASM OF LIVER (HCC): Chronic | ICD-10-CM

## 2020-02-26 DIAGNOSIS — I10 ESSENTIAL HYPERTENSION: ICD-10-CM

## 2020-02-26 DIAGNOSIS — R94.4 DECREASED GFR: ICD-10-CM

## 2020-02-26 DIAGNOSIS — N28.9 RENAL INSUFFICIENCY: ICD-10-CM

## 2020-02-26 DIAGNOSIS — D70.1 CHEMOTHERAPY-INDUCED NEUTROPENIA (HCC): ICD-10-CM

## 2020-02-26 DIAGNOSIS — C20 MALIGNANT NEOPLASM OF RECTUM (HCC): Chronic | ICD-10-CM

## 2020-02-26 DIAGNOSIS — Z00.00 ROUTINE GENERAL MEDICAL EXAMINATION AT A HEALTH CARE FACILITY: ICD-10-CM

## 2020-02-26 DIAGNOSIS — T45.1X5A CHEMOTHERAPY-INDUCED NEUTROPENIA (HCC): ICD-10-CM

## 2020-02-26 DIAGNOSIS — Z79.01 CURRENT USE OF LONG TERM ANTICOAGULATION: Chronic | ICD-10-CM

## 2020-02-26 PROCEDURE — G0439 PPPS, SUBSEQ VISIT: HCPCS | Performed by: INTERNAL MEDICINE

## 2020-02-26 ASSESSMENT — ENCOUNTER SYMPTOMS: GENERAL WELL-BEING: GOOD

## 2020-02-26 ASSESSMENT — ACTIVITIES OF DAILY LIVING (ADL): BATHING_REQUIRES_ASSISTANCE: 0

## 2020-02-26 ASSESSMENT — PATIENT HEALTH QUESTIONNAIRE - PHQ9: CLINICAL INTERPRETATION OF PHQ2 SCORE: 0

## 2020-02-26 NOTE — PROGRESS NOTES
Chief Complaint   Patient presents with   • Annual Wellness Visit         HPI:  Karlene is a 79 y.o. here for Medicare Annual Wellness Visit        Patient Active Problem List    Diagnosis Date Noted   • Shortness of breath 10/05/2015     Priority: High   • History of pulmonary embolism 08/18/2015     Priority: High   • Abnormal EKG 08/12/2013     Priority: High   • Nausea & vomiting 08/15/2015     Priority: Medium   • Hypertension 10/01/2012     Priority: Medium   • Liver disease 09/10/2019   • Current use of long term anticoagulation 06/04/2019   • Pulmonary emphysema (HCC) 01/10/2018   • Elevated alkaline phosphatase level 11/15/2017   • Travel advice encounter 11/15/2017   • Localized edema 10/04/2017   • Uncontrolled pain 06/15/2017   • Chemotherapy-induced neutropenia (HCC) 09/28/2016   • Decreased GFR 08/16/2016   • Routine general medical examination at a health care facility 03/14/2016   • Renal insufficiency 03/14/2016   • Hyperglycemia 03/14/2016   • Hyperlipidemia 09/17/2015   • Lightheadedness 09/17/2015   • Ileitis 08/24/2015   • Diarrhea 08/24/2015   • Dehydration 08/14/2015   • Secondary malignant neoplasm of liver (HCC) 07/06/2015   • Thyroid nodule 12/19/2014   • Anemia 10/01/2012   • Malignant neoplasm of rectum (HCC) 09/26/2012       Current Outpatient Medications   Medication Sig Dispense Refill   • STIOLTO RESPIMAT 2.5-2.5 MCG/ACT Aero Soln TAKE 2 PUFFS EVERY DAY 1 Inhaler 0   • potassium chloride ER (KLOR-CON) 10 MEQ tablet      • albuterol (PROAIR HFA) 108 (90 Base) MCG/ACT Aero Soln inhalation aerosol Inhale 2 Puffs by mouth every four hours as needed for Shortness of Breath (wheezing). 1 Inhaler 11   • XARELTO 10 MG Tab tablet TAKE 1 TABLET EVERY DAY 90 Tab 1   • furosemide (LASIX) 40 MG Tab Take 1 Tab by mouth every day. (Patient taking differently: Take 40 mg by mouth every day. As needed) 90 Tab 3   • lidocaine-prilocaine (EMLA) 2.5-2.5 % Cream APPLY A THICK FILM OVER THE PORT ACCESS SITE  PRIOR TO ACCESS 30 g 3   • ondansetron (ZOFRAN) 4 MG Tab tablet Take 1 Tab by mouth as needed. 30 Tab 3   • metronidazole (METROCREAM) 0.75 % cream Apply 1 Each to affected area(s) 2 times a day.     • loperamide (IMODIUM) 2 MG Cap Take 2 mg by mouth 4 times a day as needed for Diarrhea.     • cyanocobalamin (VITAMIN B-12) 500 MCG Tab Take 1,000 mcg by mouth every day.     • Multiple Vitamins-Minerals (CENTRUM SILVER PO) Take 1 Tab by mouth every day.     • Cholecalciferol (VITAMIN D3) 400 UNITS CAPS Take 1 Cap by mouth every day.     • loratadine (CLARITIN) 10 MG TABS Take 10 mg by mouth every day. Indications: Hayfever       No current facility-administered medications for this visit.         Patient is taking medications as noted in medication list.  Current supplements as per medication list.     Allergies: Oxaliplatin; Codeine; Pcn [penicillins]; Sulfa drugs; and Tape    Current social contact/activities: PT states she lives with her  and they have 3 dogs and they travel.    Is patient current with immunizations? No, due for TDAP. Patient is interested in receiving NONE today.    She  reports that she has never smoked. She has never used smokeless tobacco. She reports that she does not drink alcohol or use drugs.  Counseling given: Not Answered        DPA/Advanced directive: Patient has Advanced Directive on file.     ROS:    Gait: Uses a walker if needed  Ostomy: Yes   Other tubes: iliostomy  Amputations: No   Chronic oxygen use No   Last eye exam - 8/209  Wears hearing aids: No   : Denies any urinary leakage during the last 6 months      Depression Screening    Little interest or pleasure in doing things?  0 - not at all  Feeling down, depressed, or hopeless? 0 - not at all  Patient Health Questionnaire Score: 0    If depressive symptoms identified deferred to follow up visit unless specifically addressed in assessment and plan.    Interpretation of PHQ-9 Total Score   Score Severity   1-4 No  Depression   5-9 Mild Depression   10-14 Moderate Depression   15-19 Moderately Severe Depression   20-27 Severe Depression    Screening for Cognitive Impairment    Three Minute Recall (village, kitchen, baby)  3/3    Kleber clock face with all 12 numbers and set the hands to show 10 past 10.  Yes 5/5  If cognitive concerns identified, deferred for follow up unless specifically addressed in assessment and plan.    Fall Risk Assessment    Has the patient had two or more falls in the last year or any fall with injury in the last year?  No  If fall risk identified, deferred for follow up unless specifically addressed in assessment and plan.    Safety Assessment    Throw rugs on floor.  No  Handrails on all stairs.  Yes  Good lighting in all hallways.  Yes  Difficulty hearing.  No  Patient counseled about all safety risks that were identified.    Functional Assessment ADLs    Are there any barriers preventing you from cooking for yourself or meeting nutritional needs?  No.    Are there any barriers preventing you from driving safely or obtaining transportation?  No.    Are there any barriers preventing you from using a telephone or calling for help?  No.    Are there any barriers preventing you from shopping?  No.    Are there any barriers preventing you from taking care of your own finances?  No.    Are there any barriers preventing you from managing your medications?  No.    Are there any barriers preventing you from showering, bathing or dressing yourself?  No.    Are you currently engaging in any exercise or physical activity?  No.     What is your perception of your health?  Good.    Health Maintenance Summary                IMM DTaP/Tdap/Td Vaccine Overdue 4/13/1951     BONE DENSITY Overdue 4/13/2005     IMM ZOSTER VACCINES Overdue 3/11/2015      Done 1/14/2015 Imm Admin: Zoster Vaccine Live (ZVL) (Zostavax)    Annual Wellness Visit Overdue 2/21/2020      Done 2/20/2019      Patient has more history with this  topic...    Annual Pulmonary Function Test / Spirometry Next Due 7/17/2020      Done 7/17/2019 PULMONARY FUNCTION TESTS     Patient has more history with this topic...    COLONOSCOPY Next Due 8/23/2025      Done 8/23/2015 Surg:COLONOSCOPY WITH BIOPSY     Patient has more history with this topic...          Patient Care Team:  Manan Quiñonez M.D. as PCP - General (Internal Medicine)  Kit West M.D. as Consulting Physician (Surgery)  ARTURO Krishnan as Consulting Physician (Oncology)  Kolton Montgomery M.D. as Consulting Physician (Surgery)  Ahsan Haque M.D. as Consulting Physician (Cardiology)  Justin Zaidi M.D. as Consulting Physician (Ophthalmology)  Shruti Cortez as Respiratory  Lucho Steele M.D. as Consulting Physician (Oncology)    Social History     Tobacco Use   • Smoking status: Never Smoker   • Smokeless tobacco: Never Used   Substance Use Topics   • Alcohol use: No     Comment: very rare   • Drug use: No     Family History   Problem Relation Age of Onset   • Heart Disease Mother    • Heart Failure Mother    • Hypertension Mother    • Hyperlipidemia Mother    • Cancer Mother    • Cancer Maternal Aunt 60        breast ca   • Lung Disease Brother         COPD/Emphysema/Smoker   • Cancer Brother         prostate cancer   • Alcohol/Drug Brother         Alcohol   • Kidney Disease Father         renal failure     She  has a past medical history of Arrhythmia, Blood clotting disorder ([facility]) (08/2015), Blood transfusion (1957), Breath shortness, Cancer ([facility]) (09/2012), CATARACT, Chemotherapy-induced neutropenia ([facility]) (9/28/2016), Chickenpox, Colon cancer ([facility]), Dental disorder, Elevated alkaline phosphatase level (11/15/2017), Emphysema of lung ([facility]), Fall, Ethiopian measles, Heart murmur, Hemorrhagic disorder ([facility]), Hypercholesteremia, Hypertension, Ileostomy in place ([facility]), Indigestion, Influenza, Lightheadedness (9/17/2015), Mumps, Obstruction, Other specified symptom  associated with female genital organs, Pneumonia (05/2017), Pulmonary embolism (HCC), 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   Past Surgical History:   Procedure Laterality Date   • HEPATIC ABLATION LAPAROSCOPIC  11/15/2018    Procedure: HEPATIC ABLATION LAPAROSCOPIC.- SEGMENT 7/8;  Surgeon: Kit West M.D.;  Location: SURGERY [facility];  Service: General   • COLONOSCOPY WITH BIOPSY  8/23/2015    Procedure: COLONOSCOPY WITH BIOPSY;  Surgeon: Wilbur Glasgow M.D.;  Location: ENDOSCOPY [facility];  Service:    • HEPATIC ABLATION LAPAROSCOPIC  7/6/2015    Procedure: HEPATIC ABLATION LAPAROSCOPIC.;  Surgeon: Kit West M.D.;  Location: SURGERY [facility];  Service:    • CATH PLACEMENT Left 7/6/2015    Procedure: CATH PLACEMENT CEPHALIC POWERPORT;  Surgeon: Kit West M.D.;  Location: SURGERY [facility];  Service:    • FISTULA IN ANO REPAIR  1/28/2015    Performed by Kolton Montgomery M.D. at [facility]   • PERINEAL PROCEDURE  1/28/2015    Performed by Kolton Montgomery M.D. at [facility]   • FISTULA IN ANO REPAIR  10/15/2014    Performed by Kolton Montgomery M.D. at [facility]   • PERINEAL PROCEDURE  10/15/2014    Performed by Kolton Montgomery M.D. at [facility]   • IRRIGATION & DEBRIDEMENT GENERAL  2/19/2014    Performed by Kolton Montgomery M.D. at [facility]   • FLAP GRAFT  2/19/2014    Performed by Kolton Montgomery M.D. at [facility]   • PERINEAL PROCEDURE  12/18/2013    Performed by Kolton Montgomery M.D. at [facility]   • MYOCUTANEOUS FLAP  12/18/2013    Performed by Kolton Montgomery M.D. at [facility]   • IRRIGATION & DEBRIDEMENT GENERAL  10/23/2013    Performed by Kolton Montgomery M.D. at [facility]  "[place] [facility]   • FISTULA IN ANO REPAIR  9/4/2013    Performed by Kolton Montgomery M.D. at SURGERY [facility]   • FLAP ROTATION  9/4/2013    Performed by Kolton Montgomery M.D. at SURGERY [facility]   • RECOVERY  8/26/2013    Performed by Cath-Recovery Surgery at [facility] SAME DAY [facility]   • BIOPSY GENERAL  7/17/2013    Performed by Kolton Montgomery M.D. at SURGERY [facility]   • PROCTOSCOPY  7/17/2013    Performed by Kolton Montgomery M.D. at [facility]   • FISTULA IN ANO REPAIR  2/27/2013    Performed by Kolton Montgomery M.D. at SURGERY [facility]   • SIGMOIDOSCOPY  2/27/2013    Performed by Kolton Montgomery M.D. at [facility]   • LAPAROSCOPY  10/8/2012    Performed by Kolton Montgomery M.D. at [facility]   • ILEO LOOP DIVERSION  10/8/2012    Performed by Kolton Montgomery M.D. at SURGERY [facility]   • COLECTOMY  9/26/2012    Performed by Kolton Montgomery M.D. at SURGERY [facility]   • COLONOSCOPY FLEX W/ENDO US  9/20/2012    Performed by Harshil Bolton M.D. at [facility]   • OTHER  2009    left eye torn retina repair   • CATARACT EXTRACTION WITH IOL  2004    bilateral   • ABDOMINAL HYSTERECTOMY TOTAL  1983   • CYST EXCISION  1972    ovarian   • COLON RESECTION     • EYE SURGERY      cataracts   • HYSTERECTOMY LAPAROSCOPY     • PB REMV 2ND CATARACT,CORN-SCLER SECTN     • TONSILLECTOMY             Exam:     Pulse 68   Temp 36.8 °C (98.2 °F) (Temporal)   Ht 1.651 m (5' 5\")   Wt 79.3 kg (174 lb 12.8 oz)   SpO2 97%  Body mass index is 29.09 kg/m².    Hearing excellent.    Dentition upper and lower dentures  Alert, oriented in no acute distress.  Eye contact is good, speech goal directed, affect calm.  Neck is supple and without significant lymphadenopathy.  Lungs are clear without obvious rales or wheeze.  Cardiovascular peripheral circulation is satisfactory.  Heart sounds are unremarkable.  Abdomen is soft and without obvious masses or tenderness.  There are " normal bowel sounds.  Breast, pelvic, and rectal exams were not performed.  She does have a functioning ileostomy.      Assessment and Plan. The following treatment and monitoring plan is recommended:    History of pulmonary embolism  History of pulmonary emboli for which the patient is currently anticoagulated with Xarelto.  She denies any recent significant new dyspnea.    Malignant neoplasm of rectum (HCC)  Malignant neoplasm of rectum being followed currently by Dr. Steele and Dr. West.  This currently is in remission.    Secondary malignant neoplasm of liver (HCC)  She has a metastasis to the liver that has been ablated.    Current use of long term anticoagulation  She continues anticoagulation with Xarelto for prior pulmonary emboli.    Shortness of breath  She has some exertional dyspnea that she is treating fairly well with Stiolto.    Hypertension  Hypertension is under good control primarily with low-salt diet.  She denies lightheadedness.    Anemia  She has had mild anemia probably related to chemotherapy.  Hemoglobin hematocrit currently however are stable and in good range.    Thyroid nodule  She has a history of a thyroid nodule that was biopsied previously and found to be benign.  She reports this is unchanged recently.    Diarrhea  She has had some trouble with mild diarrhea but that seems under good control with Imodium.  She has loose or watery bowel movements through her ileostomy.    Hyperlipidemia  She has hyperlipidemia that likewise is being treated currently with diet.    Renal insufficiency  She has a history of mild renal insufficiency but renal function improves when she increases her fluids.  Of note is that much of her oral intake of fluids goes out through her ileostomy.    Hyperglycemia  Blood sugar has been mildly elevated and is currently up to 155.  She has been educated in an appropriate diet and we will repeat blood sugar and glycohemoglobin at next visit.  She has been  consuming more sweets than usual.    Decreased GFR  GFR currently is 45.  Of note again is that she loses quite a bit of fluid through her ileostomy.  She was encouraged to remain well-hydrated.    Chemotherapy-induced neutropenia  She has mild chemotherapy-induced neutropenia that is being followed closely by Dr. Steele.    Elevated alkaline phosphatase level  Alkaline phosphatase remains elevated but other liver function tests are normal.    Liver disease  Liver metastasis that has been ablated as noted.  Liver enzymes are normal except for alkaline phosphatase as noted.          Services suggested: No services needed at this time  Health Care Screening recommendations as per orders if indicated.  Referrals offered: PT/OT/Nutrition counseling/Behavioral Health/Smoking cessation as per orders if indicated.    Discussion today about general wellness and lifestyle habits:    · Prevent falls and reduce trip hazards; Cautioned about securing or removing rugs.  · Have a working fire alarm and carbon monoxide detector;   · Engage in regular physical activity and social activities       Follow-up: 3 months if not sooner especially to follow-up elevated blood sugar.

## 2020-02-27 ENCOUNTER — OUTPATIENT INFUSION SERVICES (OUTPATIENT)
Dept: ONCOLOGY | Facility: MEDICAL CENTER | Age: 80
End: 2020-02-27
Attending: INTERNAL MEDICINE
Payer: MEDICARE

## 2020-02-27 VITALS
TEMPERATURE: 96.9 F | DIASTOLIC BLOOD PRESSURE: 62 MMHG | HEART RATE: 77 BPM | WEIGHT: 173.94 LBS | OXYGEN SATURATION: 100 % | HEIGHT: 65 IN | RESPIRATION RATE: 18 BRPM | BODY MASS INDEX: 28.98 KG/M2 | SYSTOLIC BLOOD PRESSURE: 146 MMHG

## 2020-02-27 DIAGNOSIS — C20 MALIGNANT NEOPLASM OF RECTUM (HCC): ICD-10-CM

## 2020-02-27 PROCEDURE — 96523 IRRIG DRUG DELIVERY DEVICE: CPT

## 2020-02-27 PROCEDURE — 700111 HCHG RX REV CODE 636 W/ 250 OVERRIDE (IP)

## 2020-02-27 RX ADMIN — HEPARIN 500 UNITS: 100 SYRINGE at 11:52

## 2020-02-27 NOTE — ASSESSMENT & PLAN NOTE
GFR currently is 45.  Of note again is that she loses quite a bit of fluid through her ileostomy.  She was encouraged to remain well-hydrated.

## 2020-02-27 NOTE — ASSESSMENT & PLAN NOTE
She has a history of mild renal insufficiency but renal function improves when she increases her fluids.  Of note is that much of her oral intake of fluids goes out through her ileostomy.

## 2020-02-27 NOTE — ASSESSMENT & PLAN NOTE
Malignant neoplasm of rectum being followed currently by Dr. Steele and Dr. West.  This currently is in remission.

## 2020-02-27 NOTE — ASSESSMENT & PLAN NOTE
Blood sugar has been mildly elevated and is currently up to 155.  She has been educated in an appropriate diet and we will repeat blood sugar and glycohemoglobin at next visit.  She has been consuming more sweets than usual.

## 2020-02-27 NOTE — ASSESSMENT & PLAN NOTE
She has had mild anemia probably related to chemotherapy.  Hemoglobin hematocrit currently however are stable and in good range.

## 2020-02-27 NOTE — PROGRESS NOTES
Patient to [facility] for CADD pump disconnect and PORT flush + deaccess. Pump is at 0,. 74.4ml given and 72.6ml was the minimum amount that the patient needed to receive. Pump disconnected and cleaned. PORT flushed with + blood return. SASH performed. PORT deaccessed and gauze with paper tape applied as a dressing. Patient has follow up appt. Patient to home in care of self.

## 2020-02-27 NOTE — ASSESSMENT & PLAN NOTE
She has had some trouble with mild diarrhea but that seems under good control with Imodium.  She has loose or watery bowel movements through her ileostomy.

## 2020-02-27 NOTE — ASSESSMENT & PLAN NOTE
She has a history of a thyroid nodule that was biopsied previously and found to be benign.  She reports this is unchanged recently.

## 2020-02-27 NOTE — ASSESSMENT & PLAN NOTE
History of pulmonary emboli for which the patient is currently anticoagulated with Xarelto.  She denies any recent significant new dyspnea.

## 2020-02-27 NOTE — ASSESSMENT & PLAN NOTE
Liver metastasis that has been ablated as noted.  Liver enzymes are normal except for alkaline phosphatase as noted.

## 2020-03-11 RX ORDER — LIDOCAINE HYDROCHLORIDE 10 MG/ML
10 INJECTION, SOLUTION INFILTRATION; PERINEURAL ONCE
Status: CANCELLED | OUTPATIENT
Start: 2020-03-17

## 2020-03-11 RX ORDER — PROCHLORPERAZINE MALEATE 10 MG
10 TABLET ORAL EVERY 6 HOURS PRN
Status: CANCELLED | OUTPATIENT
Start: 2020-03-17

## 2020-03-11 RX ORDER — DEXAMETHASONE SODIUM PHOSPHATE 4 MG/ML
8 INJECTION, SOLUTION INTRA-ARTICULAR; INTRALESIONAL; INTRAMUSCULAR; INTRAVENOUS; SOFT TISSUE ONCE
Status: CANCELLED | OUTPATIENT
Start: 2020-03-17

## 2020-03-11 RX ORDER — ONDANSETRON 2 MG/ML
4 INJECTION INTRAMUSCULAR; INTRAVENOUS
Status: CANCELLED | OUTPATIENT
Start: 2020-03-17

## 2020-03-11 RX ORDER — ONDANSETRON 8 MG/1
8 TABLET, ORALLY DISINTEGRATING ORAL
Status: CANCELLED | OUTPATIENT
Start: 2020-03-17

## 2020-03-11 RX ORDER — DEXTROSE MONOHYDRATE 50 MG/ML
INJECTION, SOLUTION INTRAVENOUS CONTINUOUS
Status: CANCELLED | OUTPATIENT
Start: 2020-03-17

## 2020-03-16 ENCOUNTER — TELEPHONE (OUTPATIENT)
Dept: HEMATOLOGY ONCOLOGY | Facility: MEDICAL CENTER | Age: 80
End: 2020-03-16

## 2020-03-16 ENCOUNTER — HOSPITAL ENCOUNTER (OUTPATIENT)
Dept: RADIOLOGY | Facility: MEDICAL CENTER | Age: 80
End: 2020-03-16
Attending: SURGERY
Payer: MEDICARE

## 2020-03-16 ENCOUNTER — OUTPATIENT INFUSION SERVICES (OUTPATIENT)
Dept: ONCOLOGY | Facility: MEDICAL CENTER | Age: 80
End: 2020-03-16
Attending: INTERNAL MEDICINE
Payer: MEDICARE

## 2020-03-16 VITALS
WEIGHT: 171.96 LBS | DIASTOLIC BLOOD PRESSURE: 85 MMHG | HEIGHT: 65 IN | OXYGEN SATURATION: 98 % | SYSTOLIC BLOOD PRESSURE: 138 MMHG | HEART RATE: 107 BPM | TEMPERATURE: 97.3 F | BODY MASS INDEX: 28.65 KG/M2 | RESPIRATION RATE: 18 BRPM

## 2020-03-16 DIAGNOSIS — C78.7 SECONDARY MALIGNANT NEOPLASM OF LIVER (HCC): ICD-10-CM

## 2020-03-16 DIAGNOSIS — C20 MALIGNANT NEOPLASM OF RECTUM (HCC): ICD-10-CM

## 2020-03-16 LAB
ALBUMIN SERPL BCP-MCNC: 3.6 G/DL (ref 3.2–4.9)
ALBUMIN/GLOB SERPL: 1.1 G/DL
ALP SERPL-CCNC: 132 U/L (ref 30–99)
ALT SERPL-CCNC: 27 U/L (ref 2–50)
ANION GAP SERPL CALC-SCNC: 8 MMOL/L (ref 7–16)
AST SERPL-CCNC: 34 U/L (ref 12–45)
BASOPHILS # BLD AUTO: 1.5 % (ref 0–1.8)
BASOPHILS # BLD: 0.05 K/UL (ref 0–0.12)
BILIRUB SERPL-MCNC: 0.7 MG/DL (ref 0.1–1.5)
BUN SERPL-MCNC: 17 MG/DL (ref 8–22)
CALCIUM SERPL-MCNC: 10 MG/DL (ref 8.5–10.5)
CEA SERPL-MCNC: 1.3 NG/ML (ref 0–3)
CHLORIDE SERPL-SCNC: 108 MMOL/L (ref 96–112)
CO2 SERPL-SCNC: 23 MMOL/L (ref 20–33)
CREAT SERPL-MCNC: 1.06 MG/DL (ref 0.5–1.4)
EOSINOPHIL # BLD AUTO: 0.19 K/UL (ref 0–0.51)
EOSINOPHIL NFR BLD: 5.9 % (ref 0–6.9)
ERYTHROCYTE [DISTWIDTH] IN BLOOD BY AUTOMATED COUNT: 59.7 FL (ref 35.9–50)
GLOBULIN SER CALC-MCNC: 3.2 G/DL (ref 1.9–3.5)
GLUCOSE SERPL-MCNC: 92 MG/DL (ref 65–99)
HCT VFR BLD AUTO: 38 % (ref 37–47)
HGB BLD-MCNC: 12.1 G/DL (ref 12–16)
IMM GRANULOCYTES # BLD AUTO: 0.01 K/UL (ref 0–0.11)
IMM GRANULOCYTES NFR BLD AUTO: 0.3 % (ref 0–0.9)
LYMPHOCYTES # BLD AUTO: 0.98 K/UL (ref 1–4.8)
LYMPHOCYTES NFR BLD: 30.3 % (ref 22–41)
MCH RBC QN AUTO: 27.5 PG (ref 27–33)
MCHC RBC AUTO-ENTMCNC: 31.8 G/DL (ref 33.6–35)
MCV RBC AUTO: 86.4 FL (ref 81.4–97.8)
MONOCYTES # BLD AUTO: 0.54 K/UL (ref 0–0.85)
MONOCYTES NFR BLD AUTO: 16.7 % (ref 0–13.4)
NEUTROPHILS # BLD AUTO: 1.46 K/UL (ref 2–7.15)
NEUTROPHILS NFR BLD: 45.3 % (ref 44–72)
NRBC # BLD AUTO: 0 K/UL
NRBC BLD-RTO: 0 /100 WBC
PLATELET # BLD AUTO: 274 K/UL (ref 164–446)
PMV BLD AUTO: 9.7 FL (ref 9–12.9)
POTASSIUM SERPL-SCNC: 4.2 MMOL/L (ref 3.6–5.5)
PROT SERPL-MCNC: 6.8 G/DL (ref 6–8.2)
RBC # BLD AUTO: 4.4 M/UL (ref 4.2–5.4)
SODIUM SERPL-SCNC: 139 MMOL/L (ref 135–145)
WBC # BLD AUTO: 3.2 K/UL (ref 4.8–10.8)

## 2020-03-16 PROCEDURE — 82378 CARCINOEMBRYONIC ANTIGEN: CPT

## 2020-03-16 PROCEDURE — A4212 NON CORING NEEDLE OR STYLET: HCPCS

## 2020-03-16 PROCEDURE — 85025 COMPLETE CBC W/AUTO DIFF WBC: CPT

## 2020-03-16 PROCEDURE — 74160 CT ABDOMEN W/CONTRAST: CPT

## 2020-03-16 PROCEDURE — 700117 HCHG RX CONTRAST REV CODE 255: Performed by: SURGERY

## 2020-03-16 PROCEDURE — 80053 COMPREHEN METABOLIC PANEL: CPT

## 2020-03-16 PROCEDURE — 36591 DRAW BLOOD OFF VENOUS DEVICE: CPT

## 2020-03-16 PROCEDURE — 700111 HCHG RX REV CODE 636 W/ 250 OVERRIDE (IP): Performed by: RADIOLOGY

## 2020-03-16 PROCEDURE — 700111 HCHG RX REV CODE 636 W/ 250 OVERRIDE (IP)

## 2020-03-16 RX ADMIN — HEPARIN 500 UNITS: 100 SYRINGE at 09:07

## 2020-03-16 RX ADMIN — HEPARIN 500 UNITS: 100 SYRINGE at 10:05

## 2020-03-16 RX ADMIN — IOHEXOL 100 ML: 350 INJECTION, SOLUTION INTRAVENOUS at 10:01

## 2020-03-16 ASSESSMENT — FIBROSIS 4 INDEX: FIB4 SCORE: 2.02

## 2020-03-16 NOTE — TELEPHONE ENCOUNTER
1. Caller Name: Karlene Walters                       Call Back Number: [phone]        2.  Does patient have any active symptoms of respiratory illness (fever OR cough OR shortness of breath)? No.     Patient is aware of no visitors to appointment.

## 2020-03-16 NOTE — PROGRESS NOTES
Karlene into infusion services for pre-chemo port flush and lab draw. EMLA applied over port in Left upper chest. Port site cleaned and accessed in sterile fashion and per policy with 20 gauge, 1 inch Powerloc hernandez needle. Labs drawn. Pt tolerated well. Port flushed and heparin locked per [facility] policy, left accessed for CT later today. Confirmed  tomorrow's appointment prior to Karlene leaving. Patient discharged home to self care, next appointment scheduled.

## 2020-03-16 NOTE — PROGRESS NOTES
Port flushed with Heparin after CT-Scan per protocol. Access remains due to Pt request for future test.

## 2020-03-17 ENCOUNTER — APPOINTMENT (OUTPATIENT)
Dept: SURGICAL ONCOLOGY | Facility: MEDICAL CENTER | Age: 80
End: 2020-03-17
Payer: MEDICARE

## 2020-03-17 ENCOUNTER — OUTPATIENT INFUSION SERVICES (OUTPATIENT)
Dept: ONCOLOGY | Facility: MEDICAL CENTER | Age: 80
End: 2020-03-17
Attending: INTERNAL MEDICINE
Payer: MEDICARE

## 2020-03-17 ENCOUNTER — OFFICE VISIT (OUTPATIENT)
Dept: HEMATOLOGY ONCOLOGY | Facility: MEDICAL CENTER | Age: 80
End: 2020-03-17
Payer: MEDICARE

## 2020-03-17 VITALS
HEIGHT: 65 IN | WEIGHT: 170.86 LBS | SYSTOLIC BLOOD PRESSURE: 128 MMHG | TEMPERATURE: 97.9 F | DIASTOLIC BLOOD PRESSURE: 76 MMHG | OXYGEN SATURATION: 95 % | HEART RATE: 100 BPM | RESPIRATION RATE: 16 BRPM | BODY MASS INDEX: 28.47 KG/M2

## 2020-03-17 VITALS
RESPIRATION RATE: 18 BRPM | HEIGHT: 65 IN | WEIGHT: 171.08 LBS | HEART RATE: 92 BPM | SYSTOLIC BLOOD PRESSURE: 126 MMHG | DIASTOLIC BLOOD PRESSURE: 66 MMHG | BODY MASS INDEX: 28.5 KG/M2 | TEMPERATURE: 97.7 F | OXYGEN SATURATION: 96 %

## 2020-03-17 DIAGNOSIS — R94.4 DECREASED GFR: ICD-10-CM

## 2020-03-17 DIAGNOSIS — C20 MALIGNANT NEOPLASM OF RECTUM (HCC): ICD-10-CM

## 2020-03-17 DIAGNOSIS — C78.7 SECONDARY MALIGNANT NEOPLASM OF LIVER (HCC): ICD-10-CM

## 2020-03-17 DIAGNOSIS — C20 RECTAL CANCER (HCC): Chronic | ICD-10-CM

## 2020-03-17 DIAGNOSIS — Z86.711 HISTORY OF PULMONARY EMBOLUS (PE): ICD-10-CM

## 2020-03-17 PROCEDURE — 700111 HCHG RX REV CODE 636 W/ 250 OVERRIDE (IP): Performed by: NURSE PRACTITIONER

## 2020-03-17 PROCEDURE — 99213 OFFICE O/P EST LOW 20 MIN: CPT | Performed by: NURSE PRACTITIONER

## 2020-03-17 PROCEDURE — G0498 CHEMO EXTEND IV INFUS W/PUMP: HCPCS

## 2020-03-17 PROCEDURE — 96375 TX/PRO/DX INJ NEW DRUG ADDON: CPT

## 2020-03-17 PROCEDURE — 96374 THER/PROPH/DIAG INJ IV PUSH: CPT | Mod: XU

## 2020-03-17 RX ORDER — DEXAMETHASONE SODIUM PHOSPHATE 4 MG/ML
8 INJECTION, SOLUTION INTRA-ARTICULAR; INTRALESIONAL; INTRAMUSCULAR; INTRAVENOUS; SOFT TISSUE ONCE
Status: COMPLETED | OUTPATIENT
Start: 2020-03-17 | End: 2020-03-17

## 2020-03-17 RX ADMIN — FLUOROURACIL 3610 MG: 50 INJECTION, SOLUTION INTRAVENOUS at 09:28

## 2020-03-17 RX ADMIN — DEXAMETHASONE SODIUM PHOSPHATE 8 MG: 4 INJECTION, SOLUTION INTRA-ARTICULAR; INTRALESIONAL; INTRAMUSCULAR; INTRAVENOUS; SOFT TISSUE at 08:39

## 2020-03-17 ASSESSMENT — ENCOUNTER SYMPTOMS
COUGH: 0
NAUSEA: 0
MYALGIAS: 0
WHEEZING: 0
SHORTNESS OF BREATH: 1
PALPITATIONS: 1
HEADACHES: 0
TINGLING: 0
DIZZINESS: 0
WEIGHT LOSS: 0
DIARRHEA: 0
CHILLS: 0
VOMITING: 0
FEVER: 0
CONSTIPATION: 0

## 2020-03-17 ASSESSMENT — FIBROSIS 4 INDEX
FIB4 SCORE: 1.89
FIB4 SCORE: 1.89

## 2020-03-17 ASSESSMENT — PAIN SCALES - GENERAL: PAINLEVEL: NO PAIN

## 2020-03-17 NOTE — PROGRESS NOTES
"Subjective:      Karlene Walters is a 79 y.o. female who presents for Pre-Chemotherapy for metastatic rectal carcinoma.         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  She presents unaccompanied for today's visit.  Patient is scheduled to proceed with single agent 5-FU today.  Patient was recently changed to every 3-week treatment as she has been on single agent 5-FU for quite some time with good disease control.  She was changed to every 3 weeks on January 14, 2020 with cycle #91.    He has been doing very well and tolerating treatment well.  She states that she believes that she has increased energy with changing to the every 3-week regimen.  She denies any fevers or chills.  She does still have shortness of breath which is very mild.  She is more active and working outside in her yard often.  She denies any coughing or wheezing.  She does still have the heart palpitations present during treatment but is stated is not as significant as it was many years ago.  She denies any chest pain.  She denies any nausea, vomiting, diarrhea or constipation.  Her output for her ostomy is per her normal routine.  She voids that difficulty denies any pain, rashes or itching, dizziness, peripheral neuropathy or headaches.    She recently completed a CT abdomen ordered by her surgeon, Dr. West.  Per patient surgeon would like to proceed with imaging every 3 months for close monitoring.  The lesion at the dome of the liver appears to be the same if not even slightly decreased in size from previous scan.  There are no new liver lesions identified.  CT scan is quite stable.  I did personally review the imaging report in detail with the patient today.    Allergies   Allergen Reactions   • Oxaliplatin Anaphylaxis   • Codeine      \"gets drunk\"   • Pcn [Penicillins] Itching   • Sulfa Drugs Itching   • Tape Rash     PAPER TAPE OK     Current Outpatient Medications on File Prior to Visit "   Medication Sig Dispense Refill   • STIOLTO RESPIMAT 2.5-2.5 MCG/ACT Aero Soln TAKE 2 PUFFS EVERY DAY 1 Inhaler 0   • potassium chloride ER (KLOR-CON) 10 MEQ tablet      • albuterol (PROAIR HFA) 108 (90 Base) MCG/ACT Aero Soln inhalation aerosol Inhale 2 Puffs by mouth every four hours as needed for Shortness of Breath (wheezing). 1 Inhaler 11   • furosemide (LASIX) 40 MG Tab Take 1 Tab by mouth every day. (Patient taking differently: Take 40 mg by mouth every day. As needed) 90 Tab 3   • lidocaine-prilocaine (EMLA) 2.5-2.5 % Cream APPLY A THICK FILM OVER THE PORT ACCESS SITE PRIOR TO ACCESS 30 g 3   • ondansetron (ZOFRAN) 4 MG Tab tablet Take 1 Tab by mouth as needed. 30 Tab 3   • metronidazole (METROCREAM) 0.75 % cream Apply 1 Each to affected area(s) 2 times a day.     • loperamide (IMODIUM) 2 MG Cap Take 2 mg by mouth 4 times a day as needed for Diarrhea.     • cyanocobalamin (VITAMIN B-12) 500 MCG Tab Take 1,000 mcg by mouth every day.     • Multiple Vitamins-Minerals (CENTRUM SILVER PO) Take 1 Tab by mouth every day.     • Cholecalciferol (VITAMIN D3) 400 UNITS CAPS Take 1 Cap by mouth every day.     • loratadine (CLARITIN) 10 MG TABS Take 10 mg by mouth every day. Indications: Hayfever       No current facility-administered medications on file prior to visit.            Review of Systems   Constitutional: Negative for chills, fever, malaise/fatigue (increased energy with changing to every 3 weeks) and weight loss.   Respiratory: Positive for shortness of breath (mild - patient is very active and working outside ). Negative for cough and wheezing.    Cardiovascular: Positive for palpitations (still present with treatment but not as bad). Negative for chest pain.   Gastrointestinal: Negative for constipation, diarrhea, nausea and vomiting.   Genitourinary: Negative for dysuria.   Musculoskeletal: Negative for myalgias.   Skin: Negative for itching and rash.   Neurological: Negative for dizziness, tingling and  "headaches.          Objective:     /76 (BP Location: Right arm, Patient Position: Sitting, BP Cuff Size: Adult)   Pulse 100   Temp 36.6 °C (97.9 °F) (Temporal)   Resp 16   Ht 1.645 m (5' 4.76\")   Wt 77.5 kg (170 lb 13.7 oz)   LMP  (LMP Unknown)   SpO2 95%   BMI 28.64 kg/m²      Physical Exam  Vitals signs reviewed.   Constitutional:       General: She is not in acute distress.     Appearance: Normal appearance. She is not diaphoretic.   HENT:      Head: Normocephalic and atraumatic.   Cardiovascular:      Rate and Rhythm: Normal rate and regular rhythm.      Pulses: Normal pulses.      Heart sounds: Normal heart sounds. No murmur. No friction rub. No gallop.    Pulmonary:      Effort: Pulmonary effort is normal. No respiratory distress.      Breath sounds: Normal breath sounds. No wheezing.   Abdominal:      General: Bowel sounds are normal. There is no distension.      Palpations: Abdomen is soft.      Tenderness: There is no abdominal tenderness.   Musculoskeletal: Normal range of motion.         General: No swelling or tenderness.   Skin:     General: Skin is warm and dry.   Neurological:      Mental Status: She is oriented to person, place, and time.   Psychiatric:         Mood and Affect: Mood normal.           Ct-abdomen Liver For Hepatic Mass (cirrhosis)    Result Date: 3/16/2020  3/16/2020 9:34 AM HISTORY/REASON FOR EXAM:  Metastatic rectal  cancer status post ablation. TECHNIQUE/EXAM DESCRIPTION:  Triphasic CT scan of the abdomen without and with contrast. Initial precontrast images were obtained from the diaphragmatic domes through the iliac crests using helical technique.  Following nonionic contrast administration in an intravenous bolus fashion, and postcontrast, thin-section helical scanning obtained from the diaphragmatic domes through the iliac crests.  Imaging was obtained in portal venous and arterial phases. 100 mL of Omnipaque 350 nonionic contrast was administered. Low dose " optimization technique was utilized for this CT exam including automated exposure control and adjustment of the mA and/or kV according to patient size. COMPARISON: 12/30/2019 FINDINGS: Liver morphology: Liver is heterogeneous. Focal liver findings: Lesion 1: 3.3 x 2.8 cm lesion dome of the liver previously measured 3.2 x 3.3 cm. Calcifications within the periphery of the lesion again demonstrated. Arterial phase enhancement within the periphery of the lesion again demonstrated. 9.6 mm focal enhancement medial rim of the lesion similar to previous. Tubular hypodensities peripheral to the lesion similar to previous and may represent obstructed biliary ducts. Lesion 2: No new lesions identified. Hepatic arterial vasculature: Hepatic artery is patent and appears normal. Portal vein: The portal vein enhances normally and is patent. Portal vein diameter: 8.5 mm Splenic vein: The splenic vein is patent. Biliary system: Gallbladder surgically absent. Other organs: Splenic length: 11.8 cm Pancreas: Pancreas is unremarkable. Kidneys: Kidneys are unremarkable. Adrenals: Adrenal glands are unremarkable. Lymph nodes: There are no enlarged nodes. Bowel: Colon resection. Large and small bowel incompletely imaged. Right lower quadrant ostomy. No free fluid within the mid and upper abdomen. Pelvis: not scanned. Lung bases: Ill-defined opacity base of the right lower lobe adjacent to the liver lesions similar to previous findings. Small hiatal hernia. Bones: Lumbar spine degenerative changes.     Lesion at the dome of the liver the same to slightly decreased in size compared to previous. Peripheral arterial enhancement including 9.8 mm medial focal enhancement similar to previous. No new liver lesions identified. Ill-defined opacity base right lower lobe adjacent to the liver similar to previous. Colon resection and right lower quadrant ostomy. Large and small bowel incompletely imaged. LI-RADS: LR-Treated      Results for ISABEL  KANG SEBASTIAN (MRN [id number])    Ref. Range 3/16/2020 09:00   WBC Latest Ref Range: 4.8 - 10.8 K/uL 3.2 (L)   RBC Latest Ref Range: 4.20 - 5.40 M/uL 4.40   Hemoglobin Latest Ref Range: 12.0 - 16.0 g/dL 12.1   Hematocrit Latest Ref Range: 37.0 - 47.0 % 38.0   MCV Latest Ref Range: 81.4 - 97.8 fL 86.4   MCH Latest Ref Range: 27.0 - 33.0 pg 27.5   MCHC Latest Ref Range: 33.6 - 35.0 g/dL 31.8 (L)   RDW Latest Ref Range: 35.9 - 50.0 fL 59.7 (H)   Platelet Count Latest Ref Range: 164 - 446 K/uL 274   MPV Latest Ref Range: 9.0 - 12.9 fL 9.7   Neutrophils-Polys Latest Ref Range: 44.00 - 72.00 % 45.30   Neutrophils (Absolute) Latest Ref Range: 2.00 - 7.15 K/uL 1.46 (L)   Lymphocytes Latest Ref Range: 22.00 - 41.00 % 30.30   Lymphs (Absolute) Latest Ref Range: 1.00 - 4.80 K/uL 0.98 (L)   Monocytes Latest Ref Range: 0.00 - 13.40 % 16.70 (H)   Monos (Absolute) Latest Ref Range: 0.00 - 0.85 K/uL 0.54   Eosinophils Latest Ref Range: 0.00 - 6.90 % 5.90   Eos (Absolute) Latest Ref Range: 0.00 - 0.51 K/uL 0.19   Basophils Latest Ref Range: 0.00 - 1.80 % 1.50   Baso (Absolute) Latest Ref Range: 0.00 - 0.12 K/uL 0.05   Immature Granulocytes Latest Ref Range: 0.00 - 0.90 % 0.30   Immature Granulocytes (abs) Latest Ref Range: 0.00 - 0.11 K/uL 0.01   Nucleated RBC Latest Units: /100 WBC 0.00   NRBC (Absolute) Latest Units: K/uL 0.00   Sodium Latest Ref Range: 135 - 145 mmol/L 139   Potassium Latest Ref Range: 3.6 - 5.5 mmol/L 4.2   Chloride Latest Ref Range: 96 - 112 mmol/L 108   Co2 Latest Ref Range: 20 - 33 mmol/L 23   Anion Gap Latest Ref Range: 7.0 - 16.0  8.0   Glucose Latest Ref Range: 65 - 99 mg/dL 92   Bun Latest Ref Range: 8 - 22 mg/dL 17   Creatinine Latest Ref Range: 0.50 - 1.40 mg/dL 1.06   GFR If  Latest Ref Range: >60 mL/min/1.73 m 2 >60   GFR If Non  Latest Ref Range: >60 mL/min/1.73 m 2 50 (A)   Calcium Latest Ref Range: 8.5 - 10.5 mg/dL 10.0   AST(SGOT) Latest Ref Range: 12 - 45 U/L 34    ALT(SGPT) Latest Ref Range: 2 - 50 U/L 27   Alkaline Phosphatase Latest Ref Range: 30 - 99 U/L 132 (H)   Total Bilirubin Latest Ref Range: 0.1 - 1.5 mg/dL 0.7   Albumin Latest Ref Range: 3.2 - 4.9 g/dL 3.6   Total Protein Latest Ref Range: 6.0 - 8.2 g/dL 6.8   Globulin Latest Ref Range: 1.9 - 3.5 g/dL 3.2   A-G Ratio Latest Units: g/dL 1.1   Carcinoembryonic Antigen Latest Ref Range: 0.0 - 3.0 ng/mL 1.3        Assessment/Plan:       1. Malignant neoplasm of rectum (HCC)  REFERRAL TO ONCOLOGY NURSE NAVIGATOR This patient has a screening score of (must be 6 or above): 0   2. Secondary malignant neoplasm of liver (HCC)  REFERRAL TO ONCOLOGY NURSE NAVIGATOR This patient has a screening score of (must be 6 or above): 0    rivaroxaban (XARELTO) 10 MG Tab tablet   3. Rectal cancer (HCC)  rivaroxaban (XARELTO) 10 MG Tab tablet   4. History of pulmonary embolus (PE)  rivaroxaban (XARELTO) 10 MG Tab tablet       Plan  1.  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  She is on cycle #95 and tolerating this well.  She has been on this medication for quite some time with very good disease control.  As she has been on it for quite some time we did recently change her to every 3-week regimen which she started back on January 14, 2020 with cycle #91.  She is feeling like she has more energy with this change and tolerating the treatment very well.  Personally reviewed her CBC, CMP and CEA and labs are appropriate to proceed with treatment as planned.  Patient is very slightly neutropenic with an ANC of 1.46 and okay to continue to proceed as planned.  We are monitoring her CEA and noted her CEA is at 1.3 which tends to wax and wane each cycle.    2.  Patient with a history of pulmonary embolism.  She has been on Xarelto for quite some time and tolerating it well.  We do have her on a low dose at 10 mg p.o. daily and I did provide patient with a refill today.    3.  Patient to follow-up in the  clinic in 3 weeks or sooner if needed prior to the next cycle of treatment.

## 2020-03-17 NOTE — PROGRESS NOTES
Pt arrived ambulatory to [facility] for C95D1 Fluorouracil 46 hour infusion. Pt's ANC noted to be 1,460.  Order with okay to treat taken from. Ella De, APRN. Left chest wall port accessed yesterday. Flushed easily, paolo briskly. Pt received pre-med Decadron 8 mg IV. CADD pump connected to port. Pt left [facility] in NAD, aware of next appointment time.

## 2020-03-17 NOTE — PROGRESS NOTES
Chemotherapy Verification - SECONDARY RN       Height = 164.5 cm  Weight = 77.6 kg  BSA = 1.88 m2       Medication: Home infusion 5FU  Dose: 1920mg/m2 dr  Calculated Dose: 3609.6 mg                             (In mg/m2, AUC, mg/kg)       I confirm that this process was performed independently.

## 2020-03-17 NOTE — PROGRESS NOTES
Chemotherapy Verification - PRIMARY RN      Height = 164.5 cm  Weight = 77.6 kg  BSA = 1.88 m2       Medication: Florouracil  Dose: 1,920 mg/m2    Calculated Dose: 3,615 mg                             (In mg/m2, AUC, mg/kg)       I confirm this process was performed independently with the BSA and all final chemotherapy dosing calculations congruent.  Any discrepancies of 10% or greater have been addressed with the chemotherapy pharmacist. The resolution of the discrepancy has been documented in the EPIC progress notes.

## 2020-03-17 NOTE — PROGRESS NOTES
"Pharmacy Chemotherapy Verification    Patient Name: Karlene Walters   Dx: Colon CA        Cycle 103 ([place] cycle 95)    Previous treatment: 2/25/20    Protocol: 5-FU     IV over 2 hours on Day 1, followed by    IVP on Day 1, followed by                               -- 10/31/17: delete Leucovorin and 5-FU push d/t neutropenia per Dr. Hardy   Fluorouracil  continuous infusion over 46-48 hours                              -- 20% reduction (1920 mg/m²) to be continued starting C28 per C Alsop APRN   14 day cycles for 12 cycles (adjuvant) or until disease progression or unacceptable toxicity  NCCN Guidelines for Colon Cancer. V.2.2015.  Jay T, et al. Eur J Cancer.1999;35(9):1343-7.      Allergies: Codeine; Oxaliplatin; Pcn; Sulfa drugs; and Tape     /66   Pulse 92   Temp 36.5 °C (97.7 °F) (Temporal)   Resp 18   Ht 1.645 m (5' 4.75\")   Wt 77.6 kg (171 lb 1.2 oz)   LMP  (LMP Unknown)   SpO2 96%   BMI 28.69 kg/m²  Body surface area is 1.88 meters squared.    Labs 3/16/20:  ANC~ 1460 Plt = 274k   Hgb = 12.1     SCr = 1.06 mg/dL CrCl ~ 53 mL/min   AST/ALT ALK = 34/27/ TBili = 0.7   *MD aware of current labs, okay to proceed with treatment today*    Fluorouracil (5-FU) 1920 mg/m² x 1.88 m² = 3609.6 mg              <10% difference, OK to treat with final dose = 3610 mg    CIVI via CADD pump @ 1.6 mL/hr over 46 hrs       Asha Krishnan, PharmD                            "

## 2020-03-17 NOTE — PROGRESS NOTES
"Pharmacy Chemotherapy Verification Note:       Dx: Colon Ca      Protocol: 5-FU +      *Dosing Reference*                                    -- 10/31/17: delete Leucovorin and 5-FU push d/t neutropenia per Dr. Hardy   Fluorouracil 2400 mg/m² continuous infusion over 46-48 hours                              -- 20% reduction (1920 mg/m²) to be continued starting C28 per C Alsop APRN   14 day cycles for 12 cycles (adjuvant) or until disease progression or unacceptable toxicity  NCCN Guidelines for Colon Cancer. V.2.2015.  Jay T, et al. Eur J Cancer.1999;35(9):1343-7.     Allergies:  Oxaliplatin; Codeine; Pcn [penicillins]; Sulfa drugs; and Tape     /66   Pulse 92   Temp 36.5 °C (97.7 °F) (Temporal)   Resp 18   Ht 1.645 m (5' 4.75\")   Wt 77.6 kg (171 lb 1.2 oz)   LMP  (LMP Unknown)   SpO2 96%   BMI 28.69 kg/m²  Body surface area is 1.88 meters squared.    Labs 3/16/20  ANC~ 1460 Plt = 274k   Hgb = 12.1     SCr = 1.06 mg/dL CrCl ~ 51.9 mL/min   LFT's = 34/27/132 TBili = 0.7   C.Alsop APN aware, okay to proceed with treatment today    Drug Order   (Drug name, dose, route, IV Fluid & volume, frequency, number of doses) Cycle 102 ([place] 95)   Previous treatment: 2/25/20     Medication = Fluorouracil (5-FU)  Base Dose = 1920 mg/m²  Calc Dose:Base Dose x 1.88 m² = 3609.6 mg  Final Dose = 3610 mg  Route = CIVI  Fluid & Volume = 72.2 mL (+ 3mL overfill = 75.2 mL)  Admin Duration = Over 46 hours at 1.6 mL/hour   Via CADD pump       <10% difference, okay to treat with final dose     By my signature below, I confirm this process was performed independently with the BSA and all final chemotherapy dosing calculations congruent. I have reviewed the above chemotherapy order and that my calculation of the final dose and BSA (when applicable) corroborate those calculations of the  pharmacist.     Demetrius Maldonado, PharmD    "

## 2020-03-18 ENCOUNTER — OFFICE VISIT (OUTPATIENT)
Dept: CARDIOLOGY | Facility: MEDICAL CENTER | Age: 80
End: 2020-03-18
Payer: MEDICARE

## 2020-03-18 VITALS
HEIGHT: 64 IN | HEART RATE: 76 BPM | OXYGEN SATURATION: 99 % | WEIGHT: 176 LBS | DIASTOLIC BLOOD PRESSURE: 72 MMHG | SYSTOLIC BLOOD PRESSURE: 140 MMHG | BODY MASS INDEX: 30.05 KG/M2

## 2020-03-18 DIAGNOSIS — D64.9 ANEMIA, UNSPECIFIED TYPE: ICD-10-CM

## 2020-03-18 DIAGNOSIS — Z86.711 HISTORY OF PULMONARY EMBOLISM: ICD-10-CM

## 2020-03-18 DIAGNOSIS — R60.0 LOCALIZED EDEMA: ICD-10-CM

## 2020-03-18 PROCEDURE — 99214 OFFICE O/P EST MOD 30 MIN: CPT | Performed by: INTERNAL MEDICINE

## 2020-03-18 RX ORDER — FUROSEMIDE 40 MG/1
40 TABLET ORAL DAILY
Qty: 100 TAB | Refills: 3 | Status: ON HOLD
Start: 2020-03-18 | End: 2020-05-28

## 2020-03-18 RX ORDER — POTASSIUM CHLORIDE 750 MG/1
10 TABLET, FILM COATED, EXTENDED RELEASE ORAL DAILY
Qty: 100 TAB | Refills: 3 | Status: SHIPPED | OUTPATIENT
Start: 2020-03-18 | End: 2020-09-23 | Stop reason: SDUPTHER

## 2020-03-18 ASSESSMENT — ENCOUNTER SYMPTOMS
CONSTITUTIONAL NEGATIVE: 1
SORE THROAT: 0
STRIDOR: 0
SPUTUM PRODUCTION: 0
GASTROINTESTINAL NEGATIVE: 1
LOSS OF CONSCIOUSNESS: 0
WEAKNESS: 0
CHILLS: 0
RESPIRATORY NEGATIVE: 1
WHEEZING: 0
BRUISES/BLEEDS EASILY: 0
EYES NEGATIVE: 1
NEUROLOGICAL NEGATIVE: 1
MUSCULOSKELETAL NEGATIVE: 1
ORTHOPNEA: 0
CLAUDICATION: 0
PND: 0
HEMOPTYSIS: 0
SHORTNESS OF BREATH: 0
CARDIOVASCULAR NEGATIVE: 1
DIZZINESS: 0
COUGH: 0
PALPITATIONS: 0
FEVER: 0

## 2020-03-18 ASSESSMENT — FIBROSIS 4 INDEX: FIB4 SCORE: 1.89

## 2020-03-18 NOTE — PROGRESS NOTES
No chief complaint on file.      Subjective:   Karlene Walters is a 79 y.o. female who presents today as a follow-up for her PE heart murmur shortness of breath and lower extremity edema.  Since we last saw her she continues on chemotherapy.  The frequency has been removed every 3 weeks.  She has no chest pain palpitations or PND.  Her blood pressure is controlled.  She continues on Xarelto for prophylaxis from PEs.    Past Medical History:   Diagnosis Date   • Arrhythmia     occasional   • Blood clotting disorder (HCC) 08/2015    bilat PE    • Blood transfusion 1957   • Breath shortness     no O2 with moderate exertion   • Cancer (HCC) 09/2012    rectal cancer,  Liver CA-2015   • CATARACT     removed b/l   • Chemotherapy-induced neutropenia (HCC) 9/28/2016   • Chickenpox    • Colon cancer (HCC)    • Dental disorder     dentures UPPERS AND LOWERS   • Elevated alkaline phosphatase level 11/15/2017   • Emphysema of lung (HCC)    • Fall    • Upper sorbian measles    • Heart murmur     as a child   • Hemorrhagic disorder (HCC)     on Xarelto    • Hypercholesteremia    • Hypertension     no meds currently    • Ileostomy in place (HCC)    • Indigestion    • Influenza    • Lightheadedness 9/17/2015   • Mumps    • Obstruction     ileostomy   • Other specified symptom associated with female genital organs     HYSTERECTOMY 1993   • Pneumonia 05/2017    tx'd with antibx   • Pulmonary embolism (HCC)    • 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)    • Renal insufficiency 3/14/2016   • Routine general medical examination at a health care facility 3/14/2016    3/14/16   • Seasonal allergies    • Swelling of both ankles     Lasix PRN   • Tonsillitis      Past Surgical History:   Procedure Laterality Date   • HEPATIC ABLATION LAPAROSCOPIC  11/15/2018    Procedure: HEPATIC ABLATION LAPAROSCOPIC.- SEGMENT 7/8;  Surgeon: Kit West M.D.;  Location: SURGERY [facility];  Service: General   • COLONOSCOPY WITH BIOPSY   8/23/2015    Procedure: COLONOSCOPY WITH BIOPSY;  Surgeon: Wilbur Glsagow M.D.;  Location: [facility];  Service:    • HEPATIC ABLATION LAPAROSCOPIC  7/6/2015    Procedure: HEPATIC ABLATION LAPAROSCOPIC.;  Surgeon: Kit West M.D.;  Location: SURGERY [facility];  Service:    • CATH PLACEMENT Left 7/6/2015    Procedure: CATH PLACEMENT CEPHALIC POWERPORT;  Surgeon: Kit West M.D.;  Location: SURGERY [facility];  Service:    • FISTULA IN ANO REPAIR  1/28/2015    Performed by Kolton Montgomery M.D. at SURGERY [facility]   • PERINEAL PROCEDURE  1/28/2015    Performed by Kolton Montgomery M.D. at [facility]   • FISTULA IN ANO REPAIR  10/15/2014    Performed by Kolton Montgomery M.D. at [facility]   • PERINEAL PROCEDURE  10/15/2014    Performed by Kolton Montgomery M.D. at SURGERY [facility]   • IRRIGATION & DEBRIDEMENT GENERAL  2/19/2014    Performed by Kolton Montgomery M.D. at SURGERY [facility]   • FLAP GRAFT  2/19/2014    Performed by Kolton Montgomery M.D. at [facility]   • PERINEAL PROCEDURE  12/18/2013    Performed by Kolton Montgomery M.D. at [facility]   • MYOCUTANEOUS FLAP  12/18/2013    Performed by Kolton Montgomery M.D. at SURGERY [facility]   • IRRIGATION & DEBRIDEMENT GENERAL  10/23/2013    Performed by Kolton Montgomery M.D. at [facility]   • FISTULA IN ANO REPAIR  9/4/2013    Performed by Kolton Montgomery M.D. at [facility]   • FLAP ROTATION  9/4/2013    Performed by Kolton Montgomery M.D. at [facility]   • RECOVERY  8/26/2013    Performed by Cath-Recovery Surgery at [facility] SAME DAY [facility]   • BIOPSY GENERAL  7/17/2013    Performed by Kolton Montgomery M.D. at [facility]   • PROCTOSCOPY  7/17/2013    [facility]   • FISTULA IN ANO REPAIR  2/27/2013    Performed by Kolton Montgomery M.D. at [facility]   •  SIGMOIDOSCOPY  2/27/2013    Performed by Kolton Montgomery M.D. at SURGERY [facility]   • LAPAROSCOPY  10/8/2012    Performed by Kolton Montgomery M.D. at SURGERY [facility]S   • ILEO LOOP DIVERSION  10/8/2012    Performed by Kolton Montgomery M.D. at SURGERY [facility]   • COLECTOMY  9/26/2012    Performed by Kolton Montgomery M.D. at SURGERY [facility]   • COLONOSCOPY FLEX W/ENDO US  9/20/2012    Performed by Harshil Bolton M.D. at SURGERY [facility]   • OTHER  2009    left eye torn retina repair   • CATARACT EXTRACTION WITH IOL  2004    bilateral   • ABDOMINAL HYSTERECTOMY TOTAL  1983   • CYST EXCISION  1972    ovarian   • COLON RESECTION     • EYE SURGERY      cataracts   • HYSTERECTOMY LAPAROSCOPY     • PB REMV 2ND CATARACT,CORN-SCLER SECTN     • TONSILLECTOMY       Family History   Problem Relation Age of Onset   • Heart Disease Mother    • Heart Failure Mother    • Hypertension Mother    • Hyperlipidemia Mother    • Cancer Mother    • Cancer Maternal Aunt 60        breast ca   • Lung Disease Brother         COPD/Emphysema/Smoker   • Cancer Brother         prostate cancer   • Alcohol/Drug Brother         Alcohol   • Kidney Disease Father         renal failure     Social History     Socioeconomic History   • Marital status:      Spouse name: Not on file   • Number of children: Not on file   • Years of education: Not on file   • Highest education level: Not on file   Occupational History   • Not on file   Social Needs   • Financial resource strain: Not on file   • Food insecurity     Worry: Not on file     Inability: Not on file   • Transportation needs     Medical: Not on file     Non-medical: Not on file   Tobacco Use   • Smoking status: Never Smoker   • Smokeless tobacco: Never Used   Substance and Sexual Activity   • Alcohol use: No     Comment: very rare   • Drug use: No   • Sexual activity: Never     Partners: Male     Birth control/protection: Post-Menopausal   Lifestyle   • Physical  "activity     Days per week: Not on file     Minutes per session: Not on file   • Stress: Not on file   Relationships   • Social connections     Talks on phone: Not on file     Gets together: Not on file     Attends Nondenominational service: Not on file     Active member of club or organization: Not on file     Attends meetings of clubs or organizations: Not on file     Relationship status: Not on file   • Intimate partner violence     Fear of current or ex partner: Not on file     Emotionally abused: Not on file     Physically abused: Not on file     Forced sexual activity: Not on file   Other Topics Concern   • Primary/coprimary nurse & associates Not Asked   • Family contact information Not Asked   • OK to release patient information to the following Not Asked   • Patient preferred routine/Privacy concerns Not Asked   • Patient likes and dislikes Not Asked   • Participating In Research Study Not Asked   • Miscellaneous Not Asked   Social History Narrative   • Not on file     Allergies   Allergen Reactions   • Oxaliplatin Anaphylaxis   • Codeine      \"gets drunk\"   • Pcn [Penicillins] Itching   • Sulfa Drugs Itching   • Tape Rash     PAPER TAPE OK     Outpatient Encounter Medications as of 3/18/2020   Medication Sig Dispense Refill   • furosemide (LASIX) 40 MG Tab Take 1 Tab by mouth every day. 100 Tab 3   • potassium chloride ER (KLOR-CON) 10 MEQ tablet Take 1 Tab by mouth every day. 100 Tab 3   • rivaroxaban (XARELTO) 10 MG Tab tablet Take 1 Tab by mouth every day. 90 Tab 1   • STIOLTO RESPIMAT 2.5-2.5 MCG/ACT Aero Soln TAKE 2 PUFFS EVERY DAY 1 Inhaler 0   • albuterol (PROAIR HFA) 108 (90 Base) MCG/ACT Aero Soln inhalation aerosol Inhale 2 Puffs by mouth every four hours as needed for Shortness of Breath (wheezing). 1 Inhaler 11   • lidocaine-prilocaine (EMLA) 2.5-2.5 % Cream APPLY A THICK FILM OVER THE PORT ACCESS SITE PRIOR TO ACCESS 30 g 3   • ondansetron (ZOFRAN) 4 MG Tab tablet Take 1 Tab by mouth as needed. 30 Tab " "3   • metronidazole (METROCREAM) 0.75 % cream Apply 1 Each to affected area(s) 2 times a day.     • loperamide (IMODIUM) 2 MG Cap Take 2 mg by mouth 4 times a day as needed for Diarrhea.     • cyanocobalamin (VITAMIN B-12) 500 MCG Tab Take 1,000 mcg by mouth every day.     • Multiple Vitamins-Minerals (CENTRUM SILVER PO) Take 1 Tab by mouth every day.     • Cholecalciferol (VITAMIN D3) 400 UNITS CAPS Take 1 Cap by mouth every day.     • loratadine (CLARITIN) 10 MG TABS Take 10 mg by mouth every day. Indications: Hayfever     • [DISCONTINUED] potassium chloride ER (KLOR-CON) 10 MEQ tablet      • [DISCONTINUED] furosemide (LASIX) 40 MG Tab Take 1 Tab by mouth every day. (Patient taking differently: Take 40 mg by mouth every day. As needed) 90 Tab 3     No facility-administered encounter medications on file as of 3/18/2020.      Review of Systems   Constitutional: Negative.  Negative for chills, fever and malaise/fatigue.   HENT: Negative.  Negative for sore throat.    Eyes: Negative.    Respiratory: Negative.  Negative for cough, hemoptysis, sputum production, shortness of breath, wheezing and stridor.    Cardiovascular: Negative.  Negative for chest pain, palpitations, orthopnea, claudication, leg swelling and PND.   Gastrointestinal: Negative.    Genitourinary: Negative.    Musculoskeletal: Negative.    Skin: Negative.    Neurological: Negative.  Negative for dizziness, loss of consciousness and weakness.   Endo/Heme/Allergies: Negative.  Does not bruise/bleed easily.   All other systems reviewed and are negative.       Objective:   /72 (BP Location: Right arm, Patient Position: Sitting, BP Cuff Size: Adult)   Pulse 76   Ht 1.626 m (5' 4\")   Wt 79.8 kg (176 lb)   LMP  (LMP Unknown)   SpO2 99%   BMI 30.21 kg/m²     Physical Exam   Constitutional: She appears well-developed and well-nourished. No distress.   HENT:   Head: Normocephalic and atraumatic.   Right Ear: External ear normal.   Left Ear: External " ear normal.   Nose: Nose normal.   Mouth/Throat: No oropharyngeal exudate.   Eyes: Pupils are equal, round, and reactive to light. Conjunctivae and EOM are normal. Right eye exhibits no discharge. Left eye exhibits no discharge. No scleral icterus.   Neck: Neck supple. No JVD present.   Cardiovascular: Normal rate, regular rhythm and intact distal pulses. Exam reveals no gallop and no friction rub.   No murmur heard.  Pulmonary/Chest: Effort normal. No stridor. No respiratory distress. She has no wheezes. She has no rales. She exhibits no tenderness.   Abdominal: Soft. She exhibits no distension. There is no guarding.   Musculoskeletal: Normal range of motion.         General: No tenderness, deformity or edema.   Neurological: She is alert. She has normal reflexes. She displays normal reflexes. No cranial nerve deficit. She exhibits normal muscle tone. Coordination normal.   Skin: Skin is warm and dry. No rash noted. She is not diaphoretic. No erythema. No pallor.   Psychiatric: She has a normal mood and affect. Her behavior is normal. Judgment and thought content normal.   Nursing note and vitals reviewed.      Assessment:     1. History of pulmonary embolism  furosemide (LASIX) 40 MG Tab    potassium chloride ER (KLOR-CON) 10 MEQ tablet   2. Anemia, unspecified type  furosemide (LASIX) 40 MG Tab    potassium chloride ER (KLOR-CON) 10 MEQ tablet   3. Localized edema  furosemide (LASIX) 40 MG Tab    potassium chloride ER (KLOR-CON) 10 MEQ tablet       Medical Decision Making:  Today's Assessment / Status / Plan:     79-year-old female with PEs anemia edema shortness of breath and high risk medication usage.  I have refilled her Lasix and her potassium.  At this point her labs are appropriate.  She is doing well.  We can see her back in 6 months.  She will call the clinic if she has any changes.    1. Orthostatic tachycardia  - resolved     2. Shortness of breath with exertion and PEs, normal echo  - cont Rivaroxiban  10     3. LE Edema    - lasix 40 daily    - KCL 10 mEq daily    4. High Risk Meds    - labs reviewed

## 2020-03-19 ENCOUNTER — OUTPATIENT INFUSION SERVICES (OUTPATIENT)
Dept: ONCOLOGY | Facility: MEDICAL CENTER | Age: 80
End: 2020-03-19
Attending: INTERNAL MEDICINE
Payer: MEDICARE

## 2020-03-19 VITALS
SYSTOLIC BLOOD PRESSURE: 125 MMHG | HEIGHT: 65 IN | HEART RATE: 87 BPM | BODY MASS INDEX: 28.91 KG/M2 | DIASTOLIC BLOOD PRESSURE: 58 MMHG | OXYGEN SATURATION: 100 % | TEMPERATURE: 97.8 F | RESPIRATION RATE: 18 BRPM | WEIGHT: 173.5 LBS

## 2020-03-19 DIAGNOSIS — C20 MALIGNANT NEOPLASM OF RECTUM (HCC): ICD-10-CM

## 2020-03-19 PROCEDURE — 96523 IRRIG DRUG DELIVERY DEVICE: CPT

## 2020-03-19 PROCEDURE — 700111 HCHG RX REV CODE 636 W/ 250 OVERRIDE (IP)

## 2020-03-19 RX ADMIN — HEPARIN 500 UNITS: 100 SYRINGE at 11:55

## 2020-03-19 ASSESSMENT — FIBROSIS 4 INDEX: FIB4 SCORE: 1.89

## 2020-03-19 NOTE — PROGRESS NOTES
Patient arrived to [facility] for C95 D3 5FU CADD pump de-access.  Pump stopped with 0ml remaining in reservoir, 73.4 mLs administered.  Disconnected pump, Port flushed per protocol with brisk blood return noted, heparinized, de-accessed and gauze dressing applied.  Pump cleaned and placed in pharmacy drawer.  Confirmed pt's next appt. Pt discharged home in [facility].

## 2020-04-02 NOTE — PROGRESS NOTES
"04/06/20    Subjective    Chief Complaint:  Metastatic rectal cancer     HPI:  Almost 80 WMF from [place] originally dx'd in 2012. Lung and liver metastases dx'd in 8/2015. Had liver ablation and then Xelox. Did not tolerate Oxaliplatin (allergic rx) or Xeloda so switched to single agent 5FU. She had Y90 in 2017 and another liver ablation in 10/18. She is s/p a PE on Xarelto, CEA is unchanged at 1.3, CT abdomen on 3/16/20 read as unchanged. Feels well. Had one episode recently of RUQ pain and increased gas but it resolved on its own.     ROS:    Constitutional:Weight stable  Skin: No rash  HENT:No c/o's  Cardiovascular:Palpitations on chemo  Respiratory:No SOB  GI:Has ileostomy with chronic liquid stool. Has to avoid salads  : No c/o's as carissa as she drinks her water  Musculoskeletal:No bone pain  Neuro:No neuropathy  Psych:In good spirits.     PMH:      Allergies   Allergen Reactions   • Oxaliplatin Anaphylaxis   • Atorvastatin    • Codeine      \"gets drunk\"   • Pcn [Penicillins] Itching   • Sulfa Drugs Itching   • Tape Rash     PAPER TAPE OK       Past Medical History:   Diagnosis Date   • Arrhythmia     occasional   • Blood clotting disorder (HCC) 08/2015    bilat PE    • Blood transfusion 1957   • Breath shortness     no O2 with moderate exertion   • Cancer (HCC) 09/2012    rectal cancer,  Liver CA-2015   • CATARACT     removed b/l   • Chemotherapy-induced neutropenia (HCC) 9/28/2016   • Chickenpox    • Colon cancer (HCC)    • Dental disorder     dentures UPPERS AND LOWERS   • Elevated alkaline phosphatase level 11/15/2017   • Emphysema of lung (HCC)    • Fall    • Arabic measles    • Heart murmur     as a child   • Hemorrhagic disorder (HCC)     on Xarelto    • Hypercholesteremia    • Hypertension     no meds currently    • Ileostomy in place (HCC)    • Indigestion    • Influenza    • Lightheadedness 9/17/2015   • Mumps    • Obstruction     ileostomy   • Other specified symptom associated with female " genital organs     HYSTERECTOMY 1993   • Pneumonia 05/2017    tx'd with antibx   • Pulmonary embolism (HCC)    • 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)    • Renal insufficiency 3/14/2016   • Routine general medical examination at a health care facility 3/14/2016    3/14/16   • Seasonal allergies    • Swelling of both ankles     Lasix PRN   • Tonsillitis         Past Surgical History:   Procedure Laterality Date   • HEPATIC ABLATION LAPAROSCOPIC  11/15/2018    Procedure: HEPATIC ABLATION LAPAROSCOPIC.- SEGMENT 7/8;  Surgeon: Kit West M.D.;  Location: SURGERY [facility];  Service: General   • COLONOSCOPY WITH BIOPSY  8/23/2015    Procedure: COLONOSCOPY WITH BIOPSY;  Surgeon: Wilbur Glasgow M.D.;  Location: ENDOSCOPY [facility];  Service:    • HEPATIC ABLATION LAPAROSCOPIC  7/6/2015    Procedure: HEPATIC ABLATION LAPAROSCOPIC.;  Surgeon: Kit West M.D.;  Location: SURGERY [facility];  Service:    • CATH PLACEMENT Left 7/6/2015    Procedure: CATH PLACEMENT CEPHALIC POWERPORT;  Surgeon: Kit West M.D.;  Location: SURGERY [facility];  Service:    • FISTULA IN ANO REPAIR  1/28/2015    Performed by Kolton Montgomery M.D. at SURGERY [facility]   • PERINEAL PROCEDURE  1/28/2015    Performed by Kolton Montgomery M.D. at [facility]   • FISTULA IN ANO REPAIR  10/15/2014    Performed by Kolton Montgomery M.D. at [facility]   • PERINEAL PROCEDURE  10/15/2014    Performed by Kolton Montgomery M.D. at SURGERY [facility]   • IRRIGATION & DEBRIDEMENT GENERAL  2/19/2014    Performed by Kolton Montgomery M.D. at [facility]   • FLAP GRAFT  2/19/2014    Performed by Kolton Montgomery M.D. at [facility]   • PERINEAL PROCEDURE  12/18/2013    Performed by Kolton Montgomery M.D. at [facility]   • MYOCUTANEOUS FLAP  12/18/2013    Performed by Kolton Montgomery M.D. at [facility]   • IRRIGATION & DEBRIDEMENT  GENERAL  10/23/2013    Performed by Kolton Montgomery M.D. at SURGERY [facility] ORS   • FISTULA IN ANO REPAIR  9/4/2013    Performed by Kolton Montgomery M.D. at SURGERY [facility]S   • FLAP ROTATION  9/4/2013    Performed by Kolton Montgomery M.D. at SURGERY [facility]   • RECOVERY  8/26/2013    Performed by Cath-Recovery Surgery at SURGERY SAME DAY [facility]   • BIOPSY GENERAL  7/17/2013    Performed by Kolton Montgomery M.D. at SURGERY [facility]S   • PROCTOSCOPY  7/17/2013    Performed by Kolton Montgomery M.D. at SURGERY [facility]S   • FISTULA IN ANO REPAIR  2/27/2013    Performed by Kolton Montgomery M.D. at SURGERY [facility]S   • SIGMOIDOSCOPY  2/27/2013    Performed by Kolton Montgmoery M.D. at SURGERY [facility]   • LAPAROSCOPY  10/8/2012    Performed by Kolton Montgomery M.D. at SURGERY [facility]S   • ILEO LOOP DIVERSION  10/8/2012    Performed by Kolton Montgomery M.D. at SURGERY [facility]S   • COLECTOMY  9/26/2012    Performed by Kolton Montgomery M.D. at SURGERY [facility]   • COLONOSCOPY FLEX W/ENDO US  9/20/2012    Performed by Harshil Bolton M.D. at SURGERY [facility]   • OTHER  2009    left eye torn retina repair   • CATARACT EXTRACTION WITH IOL  2004    bilateral   • ABDOMINAL HYSTERECTOMY TOTAL  1983   • CYST EXCISION  1972    ovarian   • COLON RESECTION     • EYE SURGERY      cataracts   • HYSTERECTOMY LAPAROSCOPY     • PB REMV 2ND CATARACT,CORN-SCLER SECTN     • TONSILLECTOMY          Medications:    Current Outpatient Medications on File Prior to Visit   Medication Sig Dispense Refill   • furosemide (LASIX) 40 MG Tab Take 1 Tab by mouth every day. 100 Tab 3   • potassium chloride ER (KLOR-CON) 10 MEQ tablet Take 1 Tab by mouth every day. 100 Tab 3   • rivaroxaban (XARELTO) 10 MG Tab tablet Take 1 Tab by mouth every day. 90 Tab 1   • STIOLTO RESPIMAT 2.5-2.5 MCG/ACT Aero Soln TAKE 2 PUFFS EVERY DAY 1 Inhaler 0   • albuterol (PROAIR HFA) 108 (90 Base) MCG/ACT Aero Soln inhalation  "aerosol Inhale 2 Puffs by mouth every four hours as needed for Shortness of Breath (wheezing). 1 Inhaler 11   • lidocaine-prilocaine (EMLA) 2.5-2.5 % Cream APPLY A THICK FILM OVER THE PORT ACCESS SITE PRIOR TO ACCESS 30 g 3   • ondansetron (ZOFRAN) 4 MG Tab tablet Take 1 Tab by mouth as needed. 30 Tab 3   • metronidazole (METROCREAM) 0.75 % cream Apply 1 Each to affected area(s) 2 times a day.     • loperamide (IMODIUM) 2 MG Cap Take 2 mg by mouth 4 times a day as needed for Diarrhea.     • cyanocobalamin (VITAMIN B-12) 500 MCG Tab Take 1,000 mcg by mouth every day.     • Multiple Vitamins-Minerals (CENTRUM SILVER PO) Take 1 Tab by mouth every day.     • Cholecalciferol (VITAMIN D3) 400 UNITS CAPS Take 1 Cap by mouth every day.     • loratadine (CLARITIN) 10 MG TABS Take 10 mg by mouth every day. Indications: Hayfever       No current facility-administered medications on file prior to visit.        Social History     Tobacco Use   • Smoking status: Never Smoker   • Smokeless tobacco: Never Used   Substance Use Topics   • Alcohol use: No     Comment: very rare        Family History   Problem Relation Age of Onset   • Heart Disease Mother    • Heart Failure Mother    • Hypertension Mother    • Hyperlipidemia Mother    • Cancer Mother    • Cancer Maternal Aunt 60        breast ca   • Lung Disease Brother         COPD/Emphysema/Smoker   • Cancer Brother         prostate cancer   • Alcohol/Drug Brother         Alcohol   • Kidney Disease Father         renal failure        Objective    Vitals:    /64 (BP Location: Right arm, Patient Position: Sitting, BP Cuff Size: Adult)   Pulse 100   Temp 36.5 °C (97.7 °F) (Temporal)   Resp 18   Ht 1.645 m (5' 4.76\")   Wt 76.3 kg (168 lb 3.4 oz)   LMP  (LMP Unknown)   SpO2 95%   BMI 28.20 kg/m²     Physical Exam:    Appears well-developed and well-nourished. No distress.    Head -  Normocephalic .Modest alopecia.    Eyes - Pupils are equal, round, and reactive to light. " Conjunctivae and EOM are normal. No scleral icterus.   Ears - normal hearing  Mouth - Throat: Oropharynx is clear and moist. No oropharyngeal exudate  Neck - Normal range of motion. Neck supple. No thyromegaly  Cardiovascular - Normal rate, regular rhythm, normal heart sounds and intact distal pulses. No  gallop, murmur or rub  Pulmonary - Normal breath sounds.  No wheeze, rales or rhonci. Fairly deep mediport on left upper chest wall.   Breast - symmetrical. No mass on indentation.  Abdominal -Soft. No distension, tenderness, organomegaly or mass. Ileostomy on right.   Extremities-  No edema or tenderness.    Nodes - No submental, submandibular, preauricular, cervical, axillary or inguinal adenopathy.    Neurological -   Alert and oriented to person, place, and time. No focal findings  Skin - Skin is warm and dry. No rash noted. Not diaphoretic. No erythema. No pallor. No jaundice   Psychiatric -  Normal mood and affect.    LabResults for KANG HALL (MRN [id number])    Ref. Range 3/16/2020 09:00   WBC Latest Ref Range: 4.8 - 10.8 K/uL 3.2 (L)   RBC Latest Ref Range: 4.20 - 5.40 M/uL 4.40   Hemoglobin Latest Ref Range: 12.0 - 16.0 g/dL 12.1   Hematocrit Latest Ref Range: 37.0 - 47.0 % 38.0   MCV Latest Ref Range: 81.4 - 97.8 fL 86.4   MCH Latest Ref Range: 27.0 - 33.0 pg 27.5   MCHC Latest Ref Range: 33.6 - 35.0 g/dL 31.8 (L)   RDW Latest Ref Range: 35.9 - 50.0 fL 59.7 (H)   Platelet Count Latest Ref Range: 164 - 446 K/uL 274   MPV Latest Ref Range: 9.0 - 12.9 fL 9.7   Neutrophils-Polys Latest Ref Range: 44.00 - 72.00 % 45.30   Neutrophils (Absolute) Latest Ref Range: 2.00 - 7.15 K/uL 1.46 (L)   Lymphocytes Latest Ref Range: 22.00 - 41.00 % 30.30   Lymphs (Absolute) Latest Ref Range: 1.00 - 4.80 K/uL 0.98 (L)   Monocytes Latest Ref Range: 0.00 - 13.40 % 16.70 (H)     Results for KANG HALL (MRN [id number])    Ref. Range 3/16/2020 09:00   Sodium Latest Ref Range: 135 - 145 mmol/L 139   Potassium Latest  Ref Range: 3.6 - 5.5 mmol/L 4.2   Chloride Latest Ref Range: 96 - 112 mmol/L 108   Co2 Latest Ref Range: 20 - 33 mmol/L 23   Anion Gap Latest Ref Range: 7.0 - 16.0  8.0   Glucose Latest Ref Range: 65 - 99 mg/dL 92   Bun Latest Ref Range: 8 - 22 mg/dL 17   Creatinine Latest Ref Range: 0.50 - 1.40 mg/dL 1.06   GFR If  Latest Ref Range: >60 mL/min/1.73 m 2 >60   GFR If Non  Latest Ref Range: >60 mL/min/1.73 m 2 50 (A)   Calcium Latest Ref Range: 8.5 - 10.5 mg/dL 10.0   AST(SGOT) Latest Ref Range: 12 - 45 U/L 34   ALT(SGPT) Latest Ref Range: 2 - 50 U/L 27   Alkaline Phosphatase Latest Ref Range: 30 - 99 U/L 132 (H)   Total Bilirubin Latest Ref Range: 0.1 - 1.5 mg/dL 0.7   Albumin Latest Ref Range: 3.2 - 4.9 g/dL 3.6   Total Protein Latest Ref Range: 6.0 - 8.2 g/dL 6.8   Globulin Latest Ref Range: 1.9 - 3.5 g/dL 3.2   Assessment    Imp:    Visit Diagnosis:    1. Malignant neoplasm of rectum (HCC)     2. Secondary malignant neoplasm of liver (HCC)       Hard to know if the 5FU is really doing anything but everyone, patient included, afraid to stop\Will push it out to once a month    Plan:  Chemo tomorrow  RTC 1 month  CT abdomen again in June      Lucho Steele M.D.

## 2020-04-04 NOTE — PROGRESS NOTES
"Pharmacy Chemotherapy Verification    Dx: Colon Ca      Protocol: 5-FU +      *Dosing Reference*                                    -- 10/31/17: delete Leucovorin and 5-FU push d/t neutropenia per Dr. Hardy   Fluorouracil 2400 mg/m² continuous infusion over 46-48 hours                              -- 20% reduction (1920 mg/m²) to be continued starting C28 per C Alsop APRN   14 day cycles for 12 cycles (adjuvant) or until disease progression or unacceptable toxicity  NCCN Guidelines for Colon Cancer. V.2.2015.  Jay GROVER, et al. Eur J Cancer.1999;35(9):1343-7.     Allergies:  Oxaliplatin; Codeine; Pcn [penicillins]; Sulfa drugs; and Tape     /65   Pulse (!) 106   Temp 36.4 °C (97.5 °F) (Temporal)   Resp 18   Ht 1.638 m (5' 4.5\")   Wt 76.2 kg (167 lb 15.9 oz)   LMP  (LMP Unknown)   SpO2 99%   BMI 28.39 kg/m²  Body surface area is 1.86 meters squared.    Labs 4/7/20  ANC 1520 Hgb 13.1 Plt 355k  SCr 1.09 CrCl 49.5 mL/min   AST/ALT/AP = 28/26/165 Tbili = 0.5    Drug Order   (Drug name, dose, route, IV Fluid & volume, frequency, number of doses) Cycle 103 ([place] 96)   Previous treatment: 3/17/20     Medication = Fluorouracil (5-FU)  Base Dose = 1920 mg/m²  Calc Dose:Base Dose x 1.86 m² = 3571.2 mg  Final Dose = 3570 mg  Route = CIVI  Fluid & Volume = 71.4 mL (+ 3 mL overfill = 74.4 mL)  Admin Duration = Over 46 hours at 1.6 mL/hour   Via CADD pump       <10% difference, okay to treat with final dose     By my signature below, I confirm this process was performed independently with the BSA and all final chemotherapy dosing calculations congruent. I have reviewed the above chemotherapy order and that my calculation of the final dose and BSA (when applicable) corroborate those calculations of the  pharmacist.     Lora Hernandez, PharmD, BCOP    " Last visit: 4/30/19  Last refill atorvastatin: 4/8/19 #90  Last labs: 2/8/19   Refilled per standing orders

## 2020-04-06 ENCOUNTER — OFFICE VISIT (OUTPATIENT)
Dept: HEMATOLOGY ONCOLOGY | Facility: MEDICAL CENTER | Age: 80
End: 2020-04-06
Payer: MEDICARE

## 2020-04-06 VITALS
HEIGHT: 65 IN | RESPIRATION RATE: 18 BRPM | WEIGHT: 168.21 LBS | BODY MASS INDEX: 28.03 KG/M2 | OXYGEN SATURATION: 95 % | DIASTOLIC BLOOD PRESSURE: 64 MMHG | TEMPERATURE: 97.7 F | HEART RATE: 100 BPM | SYSTOLIC BLOOD PRESSURE: 130 MMHG

## 2020-04-06 DIAGNOSIS — C78.7 SECONDARY MALIGNANT NEOPLASM OF LIVER (HCC): Chronic | ICD-10-CM

## 2020-04-06 DIAGNOSIS — C20 MALIGNANT NEOPLASM OF RECTUM (HCC): Chronic | ICD-10-CM

## 2020-04-06 PROCEDURE — 99214 OFFICE O/P EST MOD 30 MIN: CPT | Performed by: INTERNAL MEDICINE

## 2020-04-06 RX ORDER — LIDOCAINE HYDROCHLORIDE 10 MG/ML
10 INJECTION, SOLUTION INFILTRATION; PERINEURAL ONCE
Status: CANCELLED | OUTPATIENT
Start: 2020-04-07

## 2020-04-06 RX ORDER — PROCHLORPERAZINE MALEATE 10 MG
10 TABLET ORAL EVERY 6 HOURS PRN
Status: CANCELLED | OUTPATIENT
Start: 2020-04-07

## 2020-04-06 RX ORDER — DEXTROSE MONOHYDRATE 50 MG/ML
INJECTION, SOLUTION INTRAVENOUS CONTINUOUS
Status: CANCELLED | OUTPATIENT
Start: 2020-04-07

## 2020-04-06 RX ORDER — ONDANSETRON 2 MG/ML
4 INJECTION INTRAMUSCULAR; INTRAVENOUS
Status: CANCELLED | OUTPATIENT
Start: 2020-04-07

## 2020-04-06 RX ORDER — ONDANSETRON 8 MG/1
8 TABLET, ORALLY DISINTEGRATING ORAL
Status: CANCELLED | OUTPATIENT
Start: 2020-04-07

## 2020-04-06 RX ORDER — DEXAMETHASONE SODIUM PHOSPHATE 4 MG/ML
8 INJECTION, SOLUTION INTRA-ARTICULAR; INTRALESIONAL; INTRAMUSCULAR; INTRAVENOUS; SOFT TISSUE ONCE
Status: CANCELLED | OUTPATIENT
Start: 2020-04-07

## 2020-04-06 ASSESSMENT — FIBROSIS 4 INDEX: FIB4 SCORE: 1.89

## 2020-04-06 ASSESSMENT — PAIN SCALES - GENERAL: PAINLEVEL: NO PAIN

## 2020-04-07 ENCOUNTER — OUTPATIENT INFUSION SERVICES (OUTPATIENT)
Dept: ONCOLOGY | Facility: MEDICAL CENTER | Age: 80
End: 2020-04-07
Attending: INTERNAL MEDICINE
Payer: MEDICARE

## 2020-04-07 VITALS
TEMPERATURE: 97.5 F | HEIGHT: 65 IN | WEIGHT: 167.99 LBS | OXYGEN SATURATION: 99 % | DIASTOLIC BLOOD PRESSURE: 65 MMHG | HEART RATE: 106 BPM | SYSTOLIC BLOOD PRESSURE: 134 MMHG | BODY MASS INDEX: 27.99 KG/M2 | RESPIRATION RATE: 18 BRPM

## 2020-04-07 DIAGNOSIS — C20 MALIGNANT NEOPLASM OF RECTUM (HCC): ICD-10-CM

## 2020-04-07 LAB
ALBUMIN SERPL BCP-MCNC: 3.9 G/DL (ref 3.2–4.9)
ALBUMIN/GLOB SERPL: 1.2 G/DL
ALP SERPL-CCNC: 165 U/L (ref 30–99)
ALT SERPL-CCNC: 26 U/L (ref 2–50)
ANION GAP SERPL CALC-SCNC: 17 MMOL/L (ref 7–16)
AST SERPL-CCNC: 28 U/L (ref 12–45)
BASOPHILS # BLD AUTO: 1.6 % (ref 0–1.8)
BASOPHILS # BLD: 0.06 K/UL (ref 0–0.12)
BILIRUB SERPL-MCNC: 0.5 MG/DL (ref 0.1–1.5)
BUN SERPL-MCNC: 24 MG/DL (ref 8–22)
CALCIUM SERPL-MCNC: 9.8 MG/DL (ref 8.5–10.5)
CEA SERPL-MCNC: 1.7 NG/ML (ref 0–3)
CHLORIDE SERPL-SCNC: 102 MMOL/L (ref 96–112)
CO2 SERPL-SCNC: 21 MMOL/L (ref 20–33)
CREAT SERPL-MCNC: 1.09 MG/DL (ref 0.5–1.4)
EOSINOPHIL # BLD AUTO: 0.22 K/UL (ref 0–0.51)
EOSINOPHIL NFR BLD: 5.9 % (ref 0–6.9)
ERYTHROCYTE [DISTWIDTH] IN BLOOD BY AUTOMATED COUNT: 59.8 FL (ref 35.9–50)
GLOBULIN SER CALC-MCNC: 3.3 G/DL (ref 1.9–3.5)
GLUCOSE SERPL-MCNC: 105 MG/DL (ref 65–99)
HCT VFR BLD AUTO: 42.7 % (ref 37–47)
HGB BLD-MCNC: 13.1 G/DL (ref 12–16)
IMM GRANULOCYTES # BLD AUTO: 0.01 K/UL (ref 0–0.11)
IMM GRANULOCYTES NFR BLD AUTO: 0.3 % (ref 0–0.9)
LYMPHOCYTES # BLD AUTO: 1.17 K/UL (ref 1–4.8)
LYMPHOCYTES NFR BLD: 31.4 % (ref 22–41)
MCH RBC QN AUTO: 26.7 PG (ref 27–33)
MCHC RBC AUTO-ENTMCNC: 30.7 G/DL (ref 33.6–35)
MCV RBC AUTO: 87 FL (ref 81.4–97.8)
MONOCYTES # BLD AUTO: 0.75 K/UL (ref 0–0.85)
MONOCYTES NFR BLD AUTO: 20.1 % (ref 0–13.4)
NEUTROPHILS # BLD AUTO: 1.52 K/UL (ref 2–7.15)
NEUTROPHILS NFR BLD: 40.7 % (ref 44–72)
NRBC # BLD AUTO: 0 K/UL
NRBC BLD-RTO: 0 /100 WBC
PLATELET # BLD AUTO: 355 K/UL (ref 164–446)
PMV BLD AUTO: 9.8 FL (ref 9–12.9)
POTASSIUM SERPL-SCNC: 4.4 MMOL/L (ref 3.6–5.5)
PROT SERPL-MCNC: 7.2 G/DL (ref 6–8.2)
RBC # BLD AUTO: 4.91 M/UL (ref 4.2–5.4)
SODIUM SERPL-SCNC: 140 MMOL/L (ref 135–145)
WBC # BLD AUTO: 3.7 K/UL (ref 4.8–10.8)

## 2020-04-07 PROCEDURE — A4212 NON CORING NEEDLE OR STYLET: HCPCS

## 2020-04-07 PROCEDURE — 700111 HCHG RX REV CODE 636 W/ 250 OVERRIDE (IP): Performed by: INTERNAL MEDICINE

## 2020-04-07 PROCEDURE — 80053 COMPREHEN METABOLIC PANEL: CPT

## 2020-04-07 PROCEDURE — 82378 CARCINOEMBRYONIC ANTIGEN: CPT

## 2020-04-07 PROCEDURE — 85025 COMPLETE CBC W/AUTO DIFF WBC: CPT

## 2020-04-07 PROCEDURE — G0498 CHEMO EXTEND IV INFUS W/PUMP: HCPCS

## 2020-04-07 PROCEDURE — 96374 THER/PROPH/DIAG INJ IV PUSH: CPT | Mod: XU

## 2020-04-07 RX ORDER — DEXAMETHASONE SODIUM PHOSPHATE 4 MG/ML
8 INJECTION, SOLUTION INTRA-ARTICULAR; INTRALESIONAL; INTRAMUSCULAR; INTRAVENOUS; SOFT TISSUE ONCE
Status: COMPLETED | OUTPATIENT
Start: 2020-04-07 | End: 2020-04-07

## 2020-04-07 RX ADMIN — DEXAMETHASONE SODIUM PHOSPHATE 8 MG: 4 INJECTION, SOLUTION INTRA-ARTICULAR; INTRALESIONAL; INTRAMUSCULAR; INTRAVENOUS; SOFT TISSUE at 09:22

## 2020-04-07 RX ADMIN — FLUOROURACIL 3570 MG: 50 INJECTION, SOLUTION INTRAVENOUS at 09:31

## 2020-04-07 ASSESSMENT — FIBROSIS 4 INDEX: FIB4 SCORE: 1.89

## 2020-04-07 NOTE — PROGRESS NOTES
Chemotherapy Verification - PRIMARY RN      Height = 163.8 cm  Weight = 76.2 kg  BSA = 1.86 m2       Medication: Fluorouracil  Dose: 1,920 mg/m2  Calculated Dose: 3,571.2 mg                             (In mg/m2, AUC, mg/kg)       I confirm this process was performed independently with the BSA and all final chemotherapy dosing calculations congruent.  Any discrepancies of 10% or greater have been addressed with the chemotherapy pharmacist. The resolution of the discrepancy has been documented in the EPIC progress notes.

## 2020-04-07 NOTE — PROGRESS NOTES
Pt arrived to [facility], ambulatory, for 5FU. Pt voices no complaints. Port accessed in sterile manner, positive blood return noted. Labs drawn and reviewed, pt within parameters to treat today. Dexamethasone administered with no s/sx of adverse reaction. 5FU CADD pump connected with no complications. Pt left [facility] with no s/sx of distress. Follow up appointment confirmed.

## 2020-04-07 NOTE — PROGRESS NOTES
Chemotherapy Verification - SECONDARY RN       Height = 163.8 cm  Weight = 76.2 kg  BSA = 1.86 m2       Medication: Adrucil  Dose: 1,920 mg/m2  Calculated Dose: 3,571.2 mg over 46 hours                             (In mg/m2, AUC, mg/kg)         I confirm that this process was performed independently.

## 2020-04-09 ENCOUNTER — OUTPATIENT INFUSION SERVICES (OUTPATIENT)
Dept: ONCOLOGY | Facility: MEDICAL CENTER | Age: 80
End: 2020-04-09
Attending: INTERNAL MEDICINE
Payer: MEDICARE

## 2020-04-09 VITALS
WEIGHT: 169.31 LBS | SYSTOLIC BLOOD PRESSURE: 123 MMHG | TEMPERATURE: 97.8 F | OXYGEN SATURATION: 99 % | HEIGHT: 65 IN | RESPIRATION RATE: 18 BRPM | HEART RATE: 98 BPM | DIASTOLIC BLOOD PRESSURE: 65 MMHG | BODY MASS INDEX: 28.21 KG/M2

## 2020-04-09 PROCEDURE — 96523 IRRIG DRUG DELIVERY DEVICE: CPT

## 2020-04-09 PROCEDURE — 700111 HCHG RX REV CODE 636 W/ 250 OVERRIDE (IP)

## 2020-04-09 RX ADMIN — HEPARIN 500 UNITS: 100 SYRINGE at 09:39

## 2020-04-09 ASSESSMENT — FIBROSIS 4 INDEX: FIB4 SCORE: 1.22

## 2020-04-09 NOTE — PROGRESS NOTES
Pt returns to [facility] for 5FU pump disconnect.  Pt reports nausea today.  Pt uses Zofran.  5FU CADD pump volume is 0ml.  Pump disconnected from pt.  R chest port flushed and brisk blood return observed.  Port heparin locked and hernandez needle removed intact.  Site covered with gauze.  Pt is aware of next appt.  Pt dc to self care.

## 2020-04-13 ENCOUNTER — TELEPHONE (OUTPATIENT)
Dept: HEMATOLOGY ONCOLOGY | Facility: MEDICAL CENTER | Age: 80
End: 2020-04-13

## 2020-04-13 DIAGNOSIS — C20 MALIGNANT NEOPLASM OF RECTUM (HCC): Chronic | ICD-10-CM

## 2020-04-13 DIAGNOSIS — R19.8 RECTAL DISCHARGE: ICD-10-CM

## 2020-04-13 DIAGNOSIS — C78.7 SECONDARY MALIGNANT NEOPLASM OF LIVER (HCC): Chronic | ICD-10-CM

## 2020-04-13 NOTE — TELEPHONE ENCOUNTER
"Patient called because she was concerned regarding her most recent lab results.  Her alkaline phosphatase has slowly trended up to 165, but when reviewing the labs she has been consistently at an elevated level.  Her CEA level also increased up to 1.7 which is the most concerning for her at this time.  She is recently being changed from every 3 weeks of 5-FU to every 4 weeks as she is having good control of her disease.  Her most recent scan in March 16, 2020 did not show any evidence of disease.  I did discuss with Dr. Steele as well and he would like to continue with the plan of treatment every 4 weeks.    Patient also is concerned that she has continued to have significant rectal discharge and issues since her colorectal surgery and placement of her ostomy.  She has been continuously asked to follow-up with her surgeon.  She stated she was informed that there is nothing else to do and that this is something she has to \"live with.\"  After further discussion she is requesting to be seen by another surgeon to get a second opinion on her concerns.  Patient stated that these issues have progressively worsened and would like to be seen by somebody new.  I placed a referral to Dr. Daugherty, with [facility] for further evaluation.  Patient is in agreement and grateful for the referral.    1. Malignant neoplasm of rectum (HCC)  REFERRAL TO GENERAL SURGERY   2. Secondary malignant neoplasm of liver (HCC)  REFERRAL TO GENERAL SURGERY   3. Rectal discharge  REFERRAL TO GENERAL SURGERY       "

## 2020-04-13 NOTE — TELEPHONE ENCOUNTER
VOICEMAIL  1. Caller Name: Karlene Walters                      Call Back Number:[phone]    2. Message: Patient would like a call from ANDREW Krishnan regarding her CEA going up and the change in her chemotherapy treatment. Patient is very concerned about the changes.    3. Patient approves office to leave a detailed voicemail/MyChart message: N\A

## 2020-04-27 RX ORDER — LIDOCAINE HYDROCHLORIDE 10 MG/ML
10 INJECTION, SOLUTION INFILTRATION; PERINEURAL ONCE
Status: CANCELLED | OUTPATIENT
Start: 2020-05-05

## 2020-04-27 RX ORDER — ONDANSETRON 8 MG/1
8 TABLET, ORALLY DISINTEGRATING ORAL
Status: CANCELLED | OUTPATIENT
Start: 2020-05-05

## 2020-04-27 RX ORDER — PROCHLORPERAZINE MALEATE 10 MG
10 TABLET ORAL EVERY 6 HOURS PRN
Status: CANCELLED | OUTPATIENT
Start: 2020-05-05

## 2020-04-27 RX ORDER — ONDANSETRON 2 MG/ML
4 INJECTION INTRAMUSCULAR; INTRAVENOUS
Status: CANCELLED | OUTPATIENT
Start: 2020-05-05

## 2020-04-27 RX ORDER — DEXAMETHASONE SODIUM PHOSPHATE 4 MG/ML
8 INJECTION, SOLUTION INTRA-ARTICULAR; INTRALESIONAL; INTRAMUSCULAR; INTRAVENOUS; SOFT TISSUE ONCE
Status: CANCELLED | OUTPATIENT
Start: 2020-05-05

## 2020-04-27 RX ORDER — DEXTROSE MONOHYDRATE 50 MG/ML
INJECTION, SOLUTION INTRAVENOUS CONTINUOUS
Status: CANCELLED | OUTPATIENT
Start: 2020-05-05

## 2020-04-28 ENCOUNTER — APPOINTMENT (OUTPATIENT)
Dept: HEMATOLOGY ONCOLOGY | Facility: MEDICAL CENTER | Age: 80
End: 2020-04-28
Payer: MEDICARE

## 2020-04-28 ENCOUNTER — APPOINTMENT (OUTPATIENT)
Dept: ONCOLOGY | Facility: MEDICAL CENTER | Age: 80
End: 2020-04-28
Attending: INTERNAL MEDICINE
Payer: MEDICARE

## 2020-04-29 ENCOUNTER — TELEPHONE (OUTPATIENT)
Dept: MEDICAL GROUP | Facility: MEDICAL CENTER | Age: 80
End: 2020-04-29

## 2020-04-29 DIAGNOSIS — R10.11 RIGHT UPPER QUADRANT ABDOMINAL PAIN: ICD-10-CM

## 2020-04-29 NOTE — TELEPHONE ENCOUNTER
Patient called and said she has pain on her right side and is asking if she can have an ultra sound to maybe look at her gallbladder. She said when it gets bad she can't take a deep breath.   Her pain today has eased up a little bit/ hard to lay on side/ yesterday she said her pain was a 10.     She said she will be in town next week.

## 2020-04-30 ENCOUNTER — APPOINTMENT (OUTPATIENT)
Dept: ONCOLOGY | Facility: MEDICAL CENTER | Age: 80
End: 2020-04-30
Attending: INTERNAL MEDICINE
Payer: MEDICARE

## 2020-05-04 ENCOUNTER — HOSPITAL ENCOUNTER (OUTPATIENT)
Dept: RADIOLOGY | Facility: MEDICAL CENTER | Age: 80
End: 2020-05-04
Attending: INTERNAL MEDICINE
Payer: MEDICARE

## 2020-05-04 DIAGNOSIS — R10.11 RIGHT UPPER QUADRANT ABDOMINAL PAIN: ICD-10-CM

## 2020-05-04 PROCEDURE — 76705 ECHO EXAM OF ABDOMEN: CPT

## 2020-05-05 ENCOUNTER — OUTPATIENT INFUSION SERVICES (OUTPATIENT)
Dept: ONCOLOGY | Facility: MEDICAL CENTER | Age: 80
End: 2020-05-05
Attending: INTERNAL MEDICINE
Payer: MEDICARE

## 2020-05-05 ENCOUNTER — OFFICE VISIT (OUTPATIENT)
Dept: HEMATOLOGY ONCOLOGY | Facility: MEDICAL CENTER | Age: 80
End: 2020-05-05
Payer: MEDICARE

## 2020-05-05 VITALS
RESPIRATION RATE: 16 BRPM | BODY MASS INDEX: 28.01 KG/M2 | HEIGHT: 65 IN | TEMPERATURE: 97.7 F | HEART RATE: 96 BPM | WEIGHT: 168.1 LBS | OXYGEN SATURATION: 97 % | DIASTOLIC BLOOD PRESSURE: 72 MMHG | SYSTOLIC BLOOD PRESSURE: 118 MMHG

## 2020-05-05 VITALS
DIASTOLIC BLOOD PRESSURE: 70 MMHG | WEIGHT: 169.31 LBS | RESPIRATION RATE: 18 BRPM | HEART RATE: 96 BPM | OXYGEN SATURATION: 96 % | HEIGHT: 65 IN | BODY MASS INDEX: 28.21 KG/M2 | SYSTOLIC BLOOD PRESSURE: 145 MMHG | TEMPERATURE: 97.3 F

## 2020-05-05 VITALS
RESPIRATION RATE: 18 BRPM | TEMPERATURE: 97.3 F | HEIGHT: 64 IN | HEART RATE: 96 BPM | SYSTOLIC BLOOD PRESSURE: 145 MMHG | BODY MASS INDEX: 28.91 KG/M2 | WEIGHT: 169.31 LBS | DIASTOLIC BLOOD PRESSURE: 70 MMHG

## 2020-05-05 DIAGNOSIS — C20 MALIGNANT NEOPLASM OF RECTUM (HCC): ICD-10-CM

## 2020-05-05 DIAGNOSIS — C20 MALIGNANT NEOPLASM OF RECTUM (HCC): Chronic | ICD-10-CM

## 2020-05-05 DIAGNOSIS — E78.5 HYPERLIPIDEMIA, UNSPECIFIED HYPERLIPIDEMIA TYPE: ICD-10-CM

## 2020-05-05 DIAGNOSIS — R73.9 HYPERGLYCEMIA: ICD-10-CM

## 2020-05-05 DIAGNOSIS — C78.7 SECONDARY MALIGNANT NEOPLASM OF LIVER (HCC): Chronic | ICD-10-CM

## 2020-05-05 LAB
ALBUMIN SERPL BCP-MCNC: 3.7 G/DL (ref 3.2–4.9)
ALBUMIN/GLOB SERPL: 1.2 G/DL
ALP SERPL-CCNC: 161 U/L (ref 30–99)
ALT SERPL-CCNC: 20 U/L (ref 2–50)
ANION GAP SERPL CALC-SCNC: 14 MMOL/L (ref 7–16)
AST SERPL-CCNC: 22 U/L (ref 12–45)
BASOPHILS # BLD AUTO: 0.9 % (ref 0–1.8)
BASOPHILS # BLD: 0.05 K/UL (ref 0–0.12)
BILIRUB SERPL-MCNC: 0.4 MG/DL (ref 0.1–1.5)
BUN SERPL-MCNC: 18 MG/DL (ref 8–22)
CALCIUM SERPL-MCNC: 9.4 MG/DL (ref 8.5–10.5)
CEA SERPL-MCNC: 1.5 NG/ML (ref 0–3)
CHLORIDE SERPL-SCNC: 101 MMOL/L (ref 96–112)
CHOLEST SERPL-MCNC: 154 MG/DL (ref 100–199)
CO2 SERPL-SCNC: 20 MMOL/L (ref 20–33)
CREAT SERPL-MCNC: 0.89 MG/DL (ref 0.5–1.4)
EOSINOPHIL # BLD AUTO: 0.24 K/UL (ref 0–0.51)
EOSINOPHIL NFR BLD: 4.5 % (ref 0–6.9)
ERYTHROCYTE [DISTWIDTH] IN BLOOD BY AUTOMATED COUNT: 56.7 FL (ref 35.9–50)
EST. AVERAGE GLUCOSE BLD GHB EST-MCNC: 111 MG/DL
GLOBULIN SER CALC-MCNC: 3.1 G/DL (ref 1.9–3.5)
GLUCOSE SERPL-MCNC: 99 MG/DL (ref 65–99)
HBA1C MFR BLD: 5.5 % (ref 0–5.6)
HCT VFR BLD AUTO: 41.5 % (ref 37–47)
HDLC SERPL-MCNC: 46 MG/DL
HGB BLD-MCNC: 12.7 G/DL (ref 12–16)
IMM GRANULOCYTES # BLD AUTO: 0.01 K/UL (ref 0–0.11)
IMM GRANULOCYTES NFR BLD AUTO: 0.2 % (ref 0–0.9)
LDLC SERPL CALC-MCNC: 77 MG/DL
LYMPHOCYTES # BLD AUTO: 1.41 K/UL (ref 1–4.8)
LYMPHOCYTES NFR BLD: 26.5 % (ref 22–41)
MCH RBC QN AUTO: 26.3 PG (ref 27–33)
MCHC RBC AUTO-ENTMCNC: 30.6 G/DL (ref 33.6–35)
MCV RBC AUTO: 85.9 FL (ref 81.4–97.8)
MONOCYTES # BLD AUTO: 0.69 K/UL (ref 0–0.85)
MONOCYTES NFR BLD AUTO: 13 % (ref 0–13.4)
NEUTROPHILS # BLD AUTO: 2.92 K/UL (ref 2–7.15)
NEUTROPHILS NFR BLD: 54.9 % (ref 44–72)
NRBC # BLD AUTO: 0 K/UL
NRBC BLD-RTO: 0 /100 WBC
PLATELET # BLD AUTO: 285 K/UL (ref 164–446)
PMV BLD AUTO: 10.1 FL (ref 9–12.9)
POTASSIUM SERPL-SCNC: 3.9 MMOL/L (ref 3.6–5.5)
PROT SERPL-MCNC: 6.8 G/DL (ref 6–8.2)
RBC # BLD AUTO: 4.83 M/UL (ref 4.2–5.4)
SODIUM SERPL-SCNC: 135 MMOL/L (ref 135–145)
TRIGL SERPL-MCNC: 156 MG/DL (ref 0–149)
WBC # BLD AUTO: 5.3 K/UL (ref 4.8–10.8)

## 2020-05-05 PROCEDURE — 700111 HCHG RX REV CODE 636 W/ 250 OVERRIDE (IP): Performed by: NURSE PRACTITIONER

## 2020-05-05 PROCEDURE — 82378 CARCINOEMBRYONIC ANTIGEN: CPT

## 2020-05-05 PROCEDURE — 85025 COMPLETE CBC W/AUTO DIFF WBC: CPT

## 2020-05-05 PROCEDURE — 96375 TX/PRO/DX INJ NEW DRUG ADDON: CPT

## 2020-05-05 PROCEDURE — 80061 LIPID PANEL: CPT

## 2020-05-05 PROCEDURE — A4212 NON CORING NEEDLE OR STYLET: HCPCS

## 2020-05-05 PROCEDURE — 83036 HEMOGLOBIN GLYCOSYLATED A1C: CPT

## 2020-05-05 PROCEDURE — 99214 OFFICE O/P EST MOD 30 MIN: CPT | Performed by: NURSE PRACTITIONER

## 2020-05-05 PROCEDURE — G0498 CHEMO EXTEND IV INFUS W/PUMP: HCPCS

## 2020-05-05 PROCEDURE — 96374 THER/PROPH/DIAG INJ IV PUSH: CPT | Mod: XU

## 2020-05-05 PROCEDURE — 80053 COMPREHEN METABOLIC PANEL: CPT

## 2020-05-05 RX ORDER — DEXAMETHASONE SODIUM PHOSPHATE 4 MG/ML
8 INJECTION, SOLUTION INTRA-ARTICULAR; INTRALESIONAL; INTRAMUSCULAR; INTRAVENOUS; SOFT TISSUE ONCE
Status: COMPLETED | OUTPATIENT
Start: 2020-05-05 | End: 2020-05-05

## 2020-05-05 RX ADMIN — FLUOROURACIL 3590 MG: 50 INJECTION, SOLUTION INTRAVENOUS at 09:07

## 2020-05-05 RX ADMIN — DEXAMETHASONE SODIUM PHOSPHATE 8 MG: 4 INJECTION, SOLUTION INTRA-ARTICULAR; INTRALESIONAL; INTRAMUSCULAR; INTRAVENOUS; SOFT TISSUE at 08:25

## 2020-05-05 ASSESSMENT — ENCOUNTER SYMPTOMS
WHEEZING: 0
VOMITING: 0
DIARRHEA: 0
PALPITATIONS: 0
NAUSEA: 0
SHORTNESS OF BREATH: 1
CONSTIPATION: 0
HEADACHES: 0
CHILLS: 0
WEIGHT LOSS: 0
DIZZINESS: 0
ABDOMINAL PAIN: 1
MYALGIAS: 0
COUGH: 1
FEVER: 0

## 2020-05-05 ASSESSMENT — FIBROSIS 4 INDEX
FIB4 SCORE: 1.38
FIB4 SCORE: 1.24
FIB4 SCORE: 1.24

## 2020-05-05 ASSESSMENT — PAIN SCALES - GENERAL: PAINLEVEL: NO PAIN

## 2020-05-05 NOTE — PROGRESS NOTES
Chemotherapy Verification - PRIMARY RN  C97 D1      Height = 1.638 m  Weight = 76.3 kg  BSA = 1.86 m2       Medication: fluorouoracil  Dose: 1920 mg/m2  Calculated Dose: 3571.2 mg over 46 hours                            (In mg/m2, AUC, mg/kg)       I confirm this process was performed independently with the BSA and all final chemotherapy dosing calculations congruent.  Any discrepancies of 10% or greater have been addressed with the chemotherapy pharmacist. The resolution of the discrepancy has been documented in the EPIC progress notes.

## 2020-05-05 NOTE — PROGRESS NOTES
Subjective:      Karlene Walters is a 80 y.o. female who presents for Pre-Chemotherapy for metastatic rectal cancer to liver.         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  She presents unaccompanied for today's visit.  Patient is scheduled to proceed with single agent 5-FU today. Patient was recently changed to every 3 week treatment on January 14, 2020 (vs 2 weeks) for cycle #91 and at last cycle she was changed to treatment every 4 weeks, this being her first 4 week interval treatment (May 5, 2020).    Patient tolerates 5-FU well.  She does have some fatigue and states she tires easily at the end of the day but she is very active.  She does note that she has had significant increase in energy since changing her interval in between treatment days.  She denies fevers or chills or weight loss.  Appetite is stable.  She does have a very mild cough mostly in the morning to clear her throat and does continue to have shortness of breath which she has been worked up significantly in the past 4.  Shortness of breath is not worsening but stable.  She denies chest pain, heart palpitations, nausea vomiting, diarrhea or constipation.  She is voiding without difficulty, denies any generalized pain, rashes or itching but does have persistent dry skin related to her 5-FU treatment.    She recently noted some right upper quadrant pain approximately 5 weeks ago.  Initially it occurred when she was out doing yard work and thought it was related to that.  However the pain has been more persistent.  She states that she gets episodes where she is inclined to take an oxycodone but she will hold off as the pain does wax and wane.  It resolves after a few hours mostly.  She did reach out to her surgeon, Dr. West regarding this pain and she recently underwent an right upper quadrant ultrasound.  I was able to review the ultrasound with patient today.  The heterogeneous hypoechoic mass  "in the superior right lobe of the liver has been present however according to the ultrasound it does appear to have increased in size from prior CT.    Allergies   Allergen Reactions   • Oxaliplatin Anaphylaxis   • Atorvastatin    • Codeine      \"gets drunk\"   • Pcn [Penicillins] Itching   • Sulfa Drugs Itching   • Tape Rash     PAPER TAPE OK     Current Outpatient Medications on File Prior to Visit   Medication Sig Dispense Refill   • furosemide (LASIX) 40 MG Tab Take 1 Tab by mouth every day. 100 Tab 3   • potassium chloride ER (KLOR-CON) 10 MEQ tablet Take 1 Tab by mouth every day. 100 Tab 3   • rivaroxaban (XARELTO) 10 MG Tab tablet Take 1 Tab by mouth every day. 90 Tab 1   • STIOLTO RESPIMAT 2.5-2.5 MCG/ACT Aero Soln TAKE 2 PUFFS EVERY DAY 1 Inhaler 0   • albuterol (PROAIR HFA) 108 (90 Base) MCG/ACT Aero Soln inhalation aerosol Inhale 2 Puffs by mouth every four hours as needed for Shortness of Breath (wheezing). 1 Inhaler 11   • lidocaine-prilocaine (EMLA) 2.5-2.5 % Cream APPLY A THICK FILM OVER THE PORT ACCESS SITE PRIOR TO ACCESS 30 g 3   • ondansetron (ZOFRAN) 4 MG Tab tablet Take 1 Tab by mouth as needed. 30 Tab 3   • metronidazole (METROCREAM) 0.75 % cream Apply 1 Each to affected area(s) 2 times a day.     • loperamide (IMODIUM) 2 MG Cap Take 2 mg by mouth 4 times a day as needed for Diarrhea.     • cyanocobalamin (VITAMIN B-12) 500 MCG Tab Take 1,000 mcg by mouth every day.     • Multiple Vitamins-Minerals (CENTRUM SILVER PO) Take 1 Tab by mouth every day.     • Cholecalciferol (VITAMIN D3) 400 UNITS CAPS Take 1 Cap by mouth every day.     • loratadine (CLARITIN) 10 MG TABS Take 10 mg by mouth every day. Indications: Hayfever       No current facility-administered medications on file prior to visit.        Review of Systems   Constitutional: Positive for malaise/fatigue (tires easily at the end of the day but very active - does feel like she has more energy). Negative for chills, fever and weight loss. " "  Respiratory: Positive for cough (very mild in the AM to clear her throat) and shortness of breath. Negative for wheezing.    Cardiovascular: Negative for chest pain and palpitations.   Gastrointestinal: Positive for abdominal pain. Negative for constipation, diarrhea, nausea and vomiting.   Genitourinary: Negative for dysuria.   Musculoskeletal: Negative for myalgias.   Skin: Negative for itching and rash.        Persistent dry skin   Neurological: Negative for dizziness and headaches.          Objective:     /72 (BP Location: Right arm, Patient Position: Sitting, BP Cuff Size: Adult)   Pulse 96   Temp 36.5 °C (97.7 °F) (Temporal)   Resp 16   Ht 1.638 m (5' 4.5\")   Wt 76.2 kg (168 lb 1.6 oz)   LMP  (LMP Unknown)   SpO2 97%   BMI 28.41 kg/m²      Physical Exam  Vitals signs reviewed.   Constitutional:       General: She is not in acute distress.     Appearance: Normal appearance. She is not diaphoretic.   HENT:      Head: Normocephalic and atraumatic.   Cardiovascular:      Rate and Rhythm: Normal rate and regular rhythm.      Pulses: Normal pulses.      Heart sounds: Normal heart sounds. No murmur. No friction rub. No gallop.    Pulmonary:      Effort: Pulmonary effort is normal.      Breath sounds: Normal breath sounds.   Abdominal:      General: Bowel sounds are normal. There is no distension.      Palpations: Abdomen is soft.      Tenderness: There is no abdominal tenderness.   Musculoskeletal: Normal range of motion.         General: No swelling or tenderness.   Skin:     General: Skin is warm and dry.   Neurological:      Mental Status: She is alert and oriented to person, place, and time.   Psychiatric:         Mood and Affect: Mood normal.         Behavior: Behavior normal.       Us-ruq    Result Date: 5/4/2020 5/4/2020 2:42 PM HISTORY/REASON FOR EXAM:  RUQ pain; Pain Abdominal pain TECHNIQUE/EXAM DESCRIPTION AND NUMBER OF VIEWS:  Real-time sonography of the liver and biliary tree. " COMPARISON: CT abdomen 3/16/2020 FINDINGS: Liver shows mildly heterogeneous echotexture.  Complex hypoechoic solid lesion at the superior RIGHT lobe liver adjacent the dome measuring 4.9 x 3.5 x 4 cm. The liver measures 13.58 cm. Gallbladder is absent. The common duct measures 0.25 cm. The visualized pancreas is unremarkable. The visualized aorta is normal in caliber. Intrahepatic IVC is patent. The portal vein is patent with hepatopetal flow. The MPV measures 0.77 cm. The right kidney measures 8.9 cm. There is no ascites.     1.  Heterogeneous hypoechoic mass at the superior RIGHT lobe liver, apparently increased in size from prior CT. 2.  Prior cholecystectomy. 3.  No biliary dilation.      Results for KANG HALL (MRN [id number])    Ref. Range 5/5/2020 07:20   WBC Latest Ref Range: 4.8 - 10.8 K/uL 5.3   RBC Latest Ref Range: 4.20 - 5.40 M/uL 4.83   Hemoglobin Latest Ref Range: 12.0 - 16.0 g/dL 12.7   Hematocrit Latest Ref Range: 37.0 - 47.0 % 41.5   MCV Latest Ref Range: 81.4 - 97.8 fL 85.9   MCH Latest Ref Range: 27.0 - 33.0 pg 26.3 (L)   MCHC Latest Ref Range: 33.6 - 35.0 g/dL 30.6 (L)   RDW Latest Ref Range: 35.9 - 50.0 fL 56.7 (H)   Platelet Count Latest Ref Range: 164 - 446 K/uL 285   MPV Latest Ref Range: 9.0 - 12.9 fL 10.1   Neutrophils-Polys Latest Ref Range: 44.00 - 72.00 % 54.90   Neutrophils (Absolute) Latest Ref Range: 2.00 - 7.15 K/uL 2.92   Lymphocytes Latest Ref Range: 22.00 - 41.00 % 26.50   Lymphs (Absolute) Latest Ref Range: 1.00 - 4.80 K/uL 1.41   Monocytes Latest Ref Range: 0.00 - 13.40 % 13.00   Monos (Absolute) Latest Ref Range: 0.00 - 0.85 K/uL 0.69   Eosinophils Latest Ref Range: 0.00 - 6.90 % 4.50   Eos (Absolute) Latest Ref Range: 0.00 - 0.51 K/uL 0.24   Basophils Latest Ref Range: 0.00 - 1.80 % 0.90   Baso (Absolute) Latest Ref Range: 0.00 - 0.12 K/uL 0.05   Immature Granulocytes Latest Ref Range: 0.00 - 0.90 % 0.20   Immature Granulocytes (abs) Latest Ref Range: 0.00 - 0.11 K/uL  0.01   Nucleated RBC Latest Units: /100 WBC 0.00   NRBC (Absolute) Latest Units: K/uL 0.00   Sodium Latest Ref Range: 135 - 145 mmol/L 135   Potassium Latest Ref Range: 3.6 - 5.5 mmol/L 3.9   Chloride Latest Ref Range: 96 - 112 mmol/L 101   Co2 Latest Ref Range: 20 - 33 mmol/L 20   Anion Gap Latest Ref Range: 7.0 - 16.0  14.0   Glucose Latest Ref Range: 65 - 99 mg/dL 99   Bun Latest Ref Range: 8 - 22 mg/dL 18   Creatinine Latest Ref Range: 0.50 - 1.40 mg/dL 0.89   GFR If  Latest Ref Range: >60 mL/min/1.73 m 2 >60   GFR If Non  Latest Ref Range: >60 mL/min/1.73 m 2 >60   Calcium Latest Ref Range: 8.5 - 10.5 mg/dL 9.4   AST(SGOT) Latest Ref Range: 12 - 45 U/L 22   ALT(SGPT) Latest Ref Range: 2 - 50 U/L 20   Alkaline Phosphatase Latest Ref Range: 30 - 99 U/L 161 (H)   Total Bilirubin Latest Ref Range: 0.1 - 1.5 mg/dL 0.4   Albumin Latest Ref Range: 3.2 - 4.9 g/dL 3.7   Total Protein Latest Ref Range: 6.0 - 8.2 g/dL 6.8   Globulin Latest Ref Range: 1.9 - 3.5 g/dL 3.1   A-G Ratio Latest Units: g/dL 1.2   Cholesterol,Tot Latest Ref Range: 100 - 199 mg/dL 154   Triglycerides Latest Ref Range: 0 - 149 mg/dL 156 (H)   HDL Latest Ref Range: >=40 mg/dL 46   LDL Latest Ref Range: <100 mg/dL 77   Carcinoembryonic Antigen Latest Ref Range: 0.0 - 3.0 ng/mL 1.5          Assessment/Plan:     1. Malignant neoplasm of rectum (HCC)  CT-ABDOMEN WITH & W/O, PELVIS WITH    CT-CHEST (THORAX) WITH   2. Secondary malignant neoplasm of liver (HCC)  CT-ABDOMEN WITH & W/O, PELVIS WITH    CT-CHEST (THORAX) WITH     Plan  1.  Patient with metastatic rectal carcinoma to liver currently on single agent 5-FU with good tolerance and stability of her disease.  Most recent CT scan on March 16, 2020 did show that her disease was stable.  However, most recently she has been experiencing increased right upper quadrant pain and underwent an ultrasound per her surgeon which does show an apparent increase in size of the right  heterogeneous hypoechoic mass in the superior lobe of the liver from previous CT.  I discussed the findings with the patient as well as Dr. Steele as well as the surgeon today.  We will go ahead and proceed with a CT for better evaluation and have them compare to the most recent CT scan in March.  We will determine whether this is true progression of disease versus difference between specific images.  We will also evaluate if there is any other evidence of progression of disease throughout or isolated if this is noted to be progression.  I have ordered a CT chest, abdomen and pelvis for further evaluation and will schedule that this week.    2.  With regards to patient's current clinical status she is feeling well and stated she is feeling much better.  She is tolerating treatment well, and I did review her CBC and CMP and her labs are appropriate to proceed with treatment today as planned.  We will continue with single agent 5-FU and await results of the imaging to determine potential plan of care.  Patient is in agreement with the plan.    We will await results from CT to determine further follow-up plan of care.

## 2020-05-05 NOTE — PROGRESS NOTES
"Pharmacy Chemotherapy Verification    Dx: Colon CA      Protocol: 5-FU +      *Dosing Reference*                                    -- 10/31/17: delete Leucovorin and 5-FU push d/t neutropenia per Dr. Hardy   Fluorouracil 2400 mg/m² continuous infusion over 46-48 hours                              -- 20% reduction (1920 mg/m²) to be continued starting C28 per C Alsop APRN   14 day cycles for 12 cycles (adjuvant) or until disease progression or unacceptable toxicity  NCCN Guidelines for Colon Cancer. V.2.2015.  Jay GROVER, et al. Eur J Cancer.1999;35(9):1343-7.     Allergies:  Oxaliplatin; Codeine; Pcn [penicillins]; Sulfa drugs; and Tape     /70 Comment: TRANSFERED OVER FROM THIS MORNING.  Pulse 96 Comment: TRANSFERED OVER FROM THIS MORNING.  Temp 36.3 °C (97.3 °F) (Temporal) Comment: TRANSFERED OVER FROM THIS MORNING. Comment (Src): TRANSFERED OVER FROM THIS MORNING.  Resp 18 Comment: TRANSFERED OVER FROM THIS MORNING.  Ht 1.638 m (5' 4.49\") Comment: TRANSFERED OVER FROM THIS MORNING.  Wt 76.8 kg (169 lb 5 oz) Comment: TRANSFERED OVER FROM THIS MORNING.  LMP  (LMP Unknown)   BMI 28.62 kg/m²  Body surface area is 1.87 meters squared.    Labs 5/5/20  Meets treatment parameters    Drug Order   (Drug name, dose, route, IV Fluid & volume, frequency, number of doses) Cycle 104 ([place] 97)   Previous treatment: 4/7/20     Medication = Fluorouracil (5-FU)  Base Dose = 1920 mg/m²  Calc Dose:Base Dose x 1.87 m² = 3590.4 mg  Final Dose = 3590 mg  Route = CIVI  Fluid & Volume = 71.8 mL (+ 3 mL overfill = 74.8 mL)  Admin Duration = Over 46 hours at 1.6 mL/hour   Via CADD pump       <10% difference, okay to treat with final dose         "

## 2020-05-05 NOTE — PROGRESS NOTES
[facility] ambulatory to [facility] for pre-chemo lab work.  Pt w/ no s/s of infection, pt has no complaints at this time.  Pt PORT accessed using sterile technique, brisk blood return noted, labs drawn per orders, flushed per protocol.  Pt to return at 0800 for chemotherapy appt.

## 2020-05-05 NOTE — PROGRESS NOTES
Chemotherapy Verification - SECONDARY RN       Height = 163.8 cm  Weight = 76.3 kg  BSA = 1.86 m2       Medication: Adrucil  Dose: 1,920 mg/m2  Calculated Dose: 3,571.2 mg over 46 hours                             (In mg/m2, AUC, mg/kg)         I confirm that this process was performed independently.

## 2020-05-05 NOTE — PROGRESS NOTES
"Pharmacy Chemotherapy Verification    Patient Name: Karlene Walters   Dx: Colon CA        Cycle 105 ([place] cycle 97)    Previous treatment: 4/7/20    Protocol: 5-FU     IV over 2 hours on Day 1, followed by    IVP on Day 1, followed by                               -- 10/31/17: delete Leucovorin and 5-FU push d/t neutropenia per Dr. Hardy   Fluorouracil  continuous infusion over 46-48 hours                              -- 20% reduction (1920 mg/m²) to be continued starting C28 per C Alsop APRN    for 12 cycles (adjuvant) or until disease progression or unacceptable toxicity - q28 days per MD orders for tolerance  NCCN Guidelines for Colon Cancer. V.2.2015.  Jay T, et al. Eur J Cancer.1999;35(9):1343-7.      Allergies: Codeine; Oxaliplatin; Pcn; Sulfa drugs; and Tape     /70 Comment: TRANSFERED OVER FROM THIS MORNING.  Pulse 96 Comment: TRANSFERED OVER FROM THIS MORNING.  Temp 36.3 °C (97.3 °F) (Temporal) Comment: TRANSFERED OVER FROM THIS MORNING. Comment (Src): TRANSFERED OVER FROM THIS MORNING.  Resp 18 Comment: TRANSFERED OVER FROM THIS MORNING.  Ht 1.638 m (5' 4.49\") Comment: TRANSFERED OVER FROM THIS MORNING.  Wt 76.8 kg (169 lb 5 oz) Comment: TRANSFERED OVER FROM THIS MORNING.  LMP  (LMP Unknown)   BMI 28.62 kg/m²  Body surface area is 1.87 meters squared.     All lab results 5/4/20 within treatment parameters.       Fluorouracil (5-FU) 1920 mg/m² x 1.87m² = 3590mg              <10% difference, okay to treat with final dose = 3590mg    CIVI via CADD pump @ 1.6mL/hr over 46 hrs    Nakia Arroyo, PharmD                            "

## 2020-05-05 NOTE — PROGRESS NOTES
Pt ambulatory to [facility] for C97 D1 of %fu CADD pump for rectal cancer.  Pt w/ no s/s of infection, pt received US results from [facility], new lesion found on pt's liver. Per pt, Ella De working to get pt a CT scan on Thursday after pt's disconnect appt.  Pt has no other complaints at this time.  PORT still accessed from lab draw, brisk blood return noted, decadron given as pre-med w/ no adverse reactions, flushed per protocol.  5FU CADD pump connected to pt PORT, all clamps opened, pump confirmed to be in RUN mode, placed in pt's konrad pack.  Pt left on foot in NAD.  Confirmed pt's next appt.

## 2020-05-06 ENCOUNTER — OFFICE VISIT (OUTPATIENT)
Dept: MEDICAL GROUP | Facility: MEDICAL CENTER | Age: 80
End: 2020-05-06
Payer: MEDICARE

## 2020-05-06 VITALS
HEART RATE: 77 BPM | OXYGEN SATURATION: 97 % | WEIGHT: 171 LBS | DIASTOLIC BLOOD PRESSURE: 70 MMHG | HEIGHT: 64 IN | TEMPERATURE: 97.9 F | BODY MASS INDEX: 29.19 KG/M2 | SYSTOLIC BLOOD PRESSURE: 130 MMHG

## 2020-05-06 DIAGNOSIS — R74.8 ELEVATED ALKALINE PHOSPHATASE LEVEL: ICD-10-CM

## 2020-05-06 DIAGNOSIS — E78.5 HYPERLIPIDEMIA, UNSPECIFIED HYPERLIPIDEMIA TYPE: ICD-10-CM

## 2020-05-06 DIAGNOSIS — N28.9 RENAL INSUFFICIENCY: ICD-10-CM

## 2020-05-06 DIAGNOSIS — C78.7 SECONDARY MALIGNANT NEOPLASM OF LIVER (HCC): Chronic | ICD-10-CM

## 2020-05-06 DIAGNOSIS — I10 ESSENTIAL HYPERTENSION: ICD-10-CM

## 2020-05-06 DIAGNOSIS — R73.9 HYPERGLYCEMIA: ICD-10-CM

## 2020-05-06 DIAGNOSIS — D64.9 ANEMIA, UNSPECIFIED TYPE: ICD-10-CM

## 2020-05-06 DIAGNOSIS — G25.81 RESTLESS LEGS: ICD-10-CM

## 2020-05-06 DIAGNOSIS — Z86.711 HISTORY OF PULMONARY EMBOLISM: Chronic | ICD-10-CM

## 2020-05-06 DIAGNOSIS — Z79.01 CURRENT USE OF LONG TERM ANTICOAGULATION: Chronic | ICD-10-CM

## 2020-05-06 DIAGNOSIS — C20 MALIGNANT NEOPLASM OF RECTUM (HCC): Chronic | ICD-10-CM

## 2020-05-06 PROCEDURE — 99214 OFFICE O/P EST MOD 30 MIN: CPT | Performed by: INTERNAL MEDICINE

## 2020-05-06 ASSESSMENT — FIBROSIS 4 INDEX: FIB4 SCORE: 1.38

## 2020-05-06 NOTE — ASSESSMENT & PLAN NOTE
She has hypertension that is well controlled primarily with low-salt diet.  She denies lightheadedness.  Blood pressure today is quite satisfactory.

## 2020-05-06 NOTE — ASSESSMENT & PLAN NOTE
Alkaline phosphatase remains mildly elevated at 161.  Other liver function tests are normal.  The ultrasound revealed that the mass in the liver has gotten a little larger.  Her right upper quadrant discomfort has improved recently.

## 2020-05-06 NOTE — ASSESSMENT & PLAN NOTE
She has a history of renal insufficiency but most recent GFR is greater than 60.  She has trouble keeping herself well-hydrated.

## 2020-05-06 NOTE — ASSESSMENT & PLAN NOTE
She has a history of elevated blood sugar.  Glucose this time is 99 and glycohemoglobin is normal at 5.5.

## 2020-05-06 NOTE — ASSESSMENT & PLAN NOTE
She reports every evening after chemotherapy that she gets restless leg syndrome.  This keeps her awake all night.

## 2020-05-06 NOTE — ASSESSMENT & PLAN NOTE
She has had some minor problems with anemia but most recent hemoglobin hematocrit are in the normal range.  She denies unusual bleeding or bruising.

## 2020-05-06 NOTE — ASSESSMENT & PLAN NOTE
She has hyperlipidemia that is controlled primarily with diet.  Most recent cholesterol is 154, triglycerides 156, HDL 46, LDL 77.

## 2020-05-06 NOTE — ASSESSMENT & PLAN NOTE
History of pulmonary embolism.  No sign or symptom of recurrence.  She remains anticoagulated with Xarelto.  She denies unusual bleeding or bruising.

## 2020-05-06 NOTE — PROGRESS NOTES
Subjective:     Chief Complaint   Patient presents with   • Follow-Up     3m   • Labs Only     Karlene Walters is a 80 y.o. female here today for follow-up of hypertension and hyperlipidemia and anemia and hyperglycemia and renal insufficiency and restless leg syndrome.    History of pulmonary embolism  History of pulmonary embolism.  No sign or symptom of recurrence.  She remains anticoagulated with Xarelto.  She denies unusual bleeding or bruising.    Hypertension  She has hypertension that is well controlled primarily with low-salt diet.  She denies lightheadedness.  Blood pressure today is quite satisfactory.    Anemia  She has had some minor problems with anemia but most recent hemoglobin hematocrit are in the normal range.  She denies unusual bleeding or bruising.    Hyperlipidemia  She has hyperlipidemia that is controlled primarily with diet.  Most recent cholesterol is 154, triglycerides 156, HDL 46, LDL 77.    Renal insufficiency  She has a history of renal insufficiency but most recent GFR is greater than 60.  She has trouble keeping herself well-hydrated.    Hyperglycemia  She has a history of elevated blood sugar.  Glucose this time is 99 and glycohemoglobin is normal at 5.5.    Elevated alkaline phosphatase level  Alkaline phosphatase remains mildly elevated at 161.  Other liver function tests are normal.  The ultrasound revealed that the mass in the liver has gotten a little larger.  Her right upper quadrant discomfort has improved recently.    Restless legs  She reports every evening after chemotherapy that she gets restless leg syndrome.  This keeps her awake all night.       Diagnoses of Malignant neoplasm of rectum (HCC), Secondary malignant neoplasm of liver., Current use of long term anticoagulation, Essential hypertension, Anemia, unspecified type, Hyperlipidemia, unspecified hyperlipidemia type, Renal insufficiency, Hyperglycemia, Elevated alkaline phosphatase level, History of pulmonary  embolism, and Restless legs were pertinent to this visit.    Allergies: Oxaliplatin; Atorvastatin; Codeine; Pcn [penicillins]; Sulfa drugs; and Tape  Current medicines (including changes today)  Current Outpatient Medications   Medication Sig Dispense Refill   • carbidopa-levodopa (SINEMET)  MG Tab Take 1 Tab by mouth every evening as needed. Following chermotherapy 10 Tab 3   • furosemide (LASIX) 40 MG Tab Take 1 Tab by mouth every day. 100 Tab 3   • potassium chloride ER (KLOR-CON) 10 MEQ tablet Take 1 Tab by mouth every day. 100 Tab 3   • rivaroxaban (XARELTO) 10 MG Tab tablet Take 1 Tab by mouth every day. 90 Tab 1   • STIOLTO RESPIMAT 2.5-2.5 MCG/ACT Aero Soln TAKE 2 PUFFS EVERY DAY 1 Inhaler 0   • albuterol (PROAIR HFA) 108 (90 Base) MCG/ACT Aero Soln inhalation aerosol Inhale 2 Puffs by mouth every four hours as needed for Shortness of Breath (wheezing). 1 Inhaler 11   • lidocaine-prilocaine (EMLA) 2.5-2.5 % Cream APPLY A THICK FILM OVER THE PORT ACCESS SITE PRIOR TO ACCESS 30 g 3   • ondansetron (ZOFRAN) 4 MG Tab tablet Take 1 Tab by mouth as needed. 30 Tab 3   • metronidazole (METROCREAM) 0.75 % cream Apply 1 Each to affected area(s) 2 times a day.     • loperamide (IMODIUM) 2 MG Cap Take 2 mg by mouth 4 times a day as needed for Diarrhea.     • cyanocobalamin (VITAMIN B-12) 500 MCG Tab Take 1,000 mcg by mouth every day.     • Multiple Vitamins-Minerals (CENTRUM SILVER PO) Take 1 Tab by mouth every day.     • Cholecalciferol (VITAMIN D3) 400 UNITS CAPS Take 1 Cap by mouth every day.     • loratadine (CLARITIN) 10 MG TABS Take 10 mg by mouth every day. Indications: Hayfever       No current facility-administered medications for this visit.        She  has a past medical history of Arrhythmia, Blood clotting disorder (HCC) (08/2015), Blood transfusion (1957), Breath shortness, Cancer ([facility]) (09/2012), CATARACT, Chemotherapy-induced neutropenia (HCC) (9/28/2016), Chickenpox, Colon cancer ([facility]), Dental  "disorder, Elevated alkaline phosphatase level (11/15/2017), Emphysema of lung (HCC), Fall, [place] measles, Heart murmur, Hemorrhagic disorder (HCC), Hypercholesteremia, Hypertension, Ileostomy in place (HCC), Indigestion, Influenza, Lightheadedness (9/17/2015), Mumps, Obstruction, Other specified symptom associated with female genital organs, Pneumonia (05/2017), Pulmonary embolism (HCC), 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  Health Maintenance: Tdap and shingles vaccines times with her chemotherapy.  ROS    Patient denies significant change in strength, weight or appetite.  No significant lightheadedness or headaches.  No change in vision, hearing, or swallowing.  No new dyspnea, coughing, chest pain, or palpitations.  No indigestion, abdominal pain, or change in bowel habits.  No change in urinating.  No new ankle swelling.       Objective:     PE:  /70 (BP Location: Left arm, Patient Position: Sitting)   Pulse 77   Temp 36.6 °C (97.9 °F)   Ht 1.626 m (5' 4\")   Wt 77.6 kg (171 lb)   LMP  (LMP Unknown)   SpO2 97%   BMI 29.35 kg/m²   Neck is supple without significant lymphadenopathy or masses.  Lungs are clear with normal breath sounds without wheezes or rales .  Cardiovascular: peripheral circulation is satisfactory, heart sounds are unchanged and unremarkable.  Abdomen is soft, without masses or tenderness, with normal bowel sounds.  Liver edge is not palpable and there is no significant tenderness on deep inspiration in the right upper quadrant.  Extremities are without significant edema, cyanosis or deformity.      Assessment and Plan:   The following treatment plan was discussed  1. Malignant neoplasm of rectum (HCC)      Continue close follow-up with Dr. Steele.   2. Secondary malignant " neoplasm of liver.      Follow-up with Dr. Steele.   3. Current use of long term anticoagulation      Continue Xarelto.  Report any unusual bleeding or bruising.   4. Essential hypertension      We again reviewed low-salt diet.  She will check her blood pressure at home periodically and report back to us.   5. Anemia, unspecified type      Continue periodic blood counts related to her chemotherapy.   6. Hyperlipidemia, unspecified hyperlipidemia type      We reviewed appropriate diet.   7. Renal insufficiency      She was encouraged to remain well-hydrated.   8. Hyperglycemia      Avoid concentrated sweets especially on an empty stomach.   9. Elevated alkaline phosphatase level      Further evaluation and treatment per Dr. Steele.   10. History of pulmonary embolism      Continue anticoagulation with Xarelto.   11. Restless legs      Try taking Sinemet on the evenings after she has her chemotherapy.       Followup: Return in about 9 months (around 2/6/2021), or health  wellness, for Long.  We of course will otherwise be happy to see her sooner as needed.

## 2020-05-07 ENCOUNTER — HOSPITAL ENCOUNTER (OUTPATIENT)
Dept: RADIOLOGY | Facility: MEDICAL CENTER | Age: 80
End: 2020-05-07
Attending: NURSE PRACTITIONER
Payer: MEDICARE

## 2020-05-07 ENCOUNTER — OUTPATIENT INFUSION SERVICES (OUTPATIENT)
Dept: ONCOLOGY | Facility: MEDICAL CENTER | Age: 80
End: 2020-05-07
Attending: INTERNAL MEDICINE
Payer: MEDICARE

## 2020-05-07 VITALS
HEART RATE: 75 BPM | BODY MASS INDEX: 27.92 KG/M2 | RESPIRATION RATE: 18 BRPM | OXYGEN SATURATION: 95 % | WEIGHT: 167.55 LBS | TEMPERATURE: 97.4 F | SYSTOLIC BLOOD PRESSURE: 140 MMHG | DIASTOLIC BLOOD PRESSURE: 64 MMHG | HEIGHT: 65 IN

## 2020-05-07 DIAGNOSIS — C78.7 SECONDARY MALIGNANT NEOPLASM OF LIVER (HCC): Chronic | ICD-10-CM

## 2020-05-07 DIAGNOSIS — C20 MALIGNANT NEOPLASM OF RECTUM (HCC): Chronic | ICD-10-CM

## 2020-05-07 PROCEDURE — 700111 HCHG RX REV CODE 636 W/ 250 OVERRIDE (IP)

## 2020-05-07 PROCEDURE — A4212 NON CORING NEEDLE OR STYLET: HCPCS

## 2020-05-07 PROCEDURE — 96523 IRRIG DRUG DELIVERY DEVICE: CPT

## 2020-05-07 PROCEDURE — 71260 CT THORAX DX C+: CPT

## 2020-05-07 PROCEDURE — 74178 CT ABD&PLV WO CNTR FLWD CNTR: CPT

## 2020-05-07 PROCEDURE — 700117 HCHG RX CONTRAST REV CODE 255: Performed by: NURSE PRACTITIONER

## 2020-05-07 PROCEDURE — 700111 HCHG RX REV CODE 636 W/ 250 OVERRIDE (IP): Performed by: RADIOLOGY

## 2020-05-07 RX ADMIN — IOHEXOL 100 ML: 350 INJECTION, SOLUTION INTRAVENOUS at 15:40

## 2020-05-07 RX ADMIN — HEPARIN 500 UNITS: 100 SYRINGE at 15:50

## 2020-05-07 RX ADMIN — HEPARIN 500 UNITS: 100 SYRINGE at 11:55

## 2020-05-07 ASSESSMENT — FIBROSIS 4 INDEX: FIB4 SCORE: 1.38

## 2020-05-07 NOTE — PROGRESS NOTES
Pt arrived in CT with port accessed. Dressing dry and intact. Site clean, dry, non-tender. Port flushed easily with good blood return using aseptic technique. After Scan, port flushed with 20 ml NS and 500 units Heparin flush per protocol. Port de-accessed and gauze dressing applied with paper tape. No oozing noted

## 2020-05-07 NOTE — PROGRESS NOTES
Pt arrived to [facility], ambulatory, for pump de-access/port re-access for CT. Pt voices no complaints. Pump turned off upon arrival, 0 mL left to be delivered. Pump disconnected and chemotherapy cassette disposed of. Port flushed and de-accessed, port re-accessed in sterile manner, positive blood return noted. Port flushed and heparin locked per policy, left accessed for CT scan. Pt left IS with no s/sx of distress. Follow up appointment confirmed.

## 2020-05-11 ENCOUNTER — TELEPHONE (OUTPATIENT)
Dept: HEMATOLOGY ONCOLOGY | Facility: MEDICAL CENTER | Age: 80
End: 2020-05-11

## 2020-05-11 DIAGNOSIS — C20 MALIGNANT NEOPLASM OF RECTUM (HCC): ICD-10-CM

## 2020-05-11 DIAGNOSIS — C78.7 SECONDARY MALIGNANT NEOPLASM OF LIVER (HCC): ICD-10-CM

## 2020-05-11 RX ORDER — DEXTROSE MONOHYDRATE 50 MG/ML
INJECTION, SOLUTION INTRAVENOUS CONTINUOUS
Status: CANCELLED | OUTPATIENT
Start: 2020-06-16

## 2020-05-11 RX ORDER — ONDANSETRON 2 MG/ML
4 INJECTION INTRAMUSCULAR; INTRAVENOUS
Status: CANCELLED | OUTPATIENT
Start: 2020-06-16

## 2020-05-11 RX ORDER — ONDANSETRON 8 MG/1
8 TABLET, ORALLY DISINTEGRATING ORAL
Status: CANCELLED | OUTPATIENT
Start: 2020-06-16

## 2020-05-11 RX ORDER — PROCHLORPERAZINE MALEATE 10 MG
10 TABLET ORAL EVERY 6 HOURS PRN
Status: CANCELLED | OUTPATIENT
Start: 2020-06-16

## 2020-05-11 RX ORDER — LIDOCAINE HYDROCHLORIDE 10 MG/ML
10 INJECTION, SOLUTION INFILTRATION; PERINEURAL ONCE
Status: CANCELLED | OUTPATIENT
Start: 2020-06-16

## 2020-05-11 RX ORDER — DEXAMETHASONE SODIUM PHOSPHATE 4 MG/ML
8 INJECTION, SOLUTION INTRA-ARTICULAR; INTRALESIONAL; INTRAMUSCULAR; INTRAVENOUS; SOFT TISSUE ONCE
Status: CANCELLED | OUTPATIENT
Start: 2020-06-16

## 2020-05-11 NOTE — TELEPHONE ENCOUNTER
Phone Number Called: [phone]    Call outcome: Spoke to patient regarding message below.    Message:   PET Scan  Date: Thursday 05/21/2020  Time: Check in at 1:00 pm  Location: [address] [place] [address]  Preparation: Diet begins Wednesday 05/20/2020 at 1:30 pm      Preparation Instructions for a PET/CT Scan    • Please check in 30 minutes before your appointment time with current insurance card, photo ID and required Co-payment or deductible.  Accuracy of the PET/CT scan depends heavily on your body’s metabolic activity and we need you to follow the diet one day prior to your appointment.  This preparation, which includes Minimal amounts of carbohydrates is necessary to ensure excellent imaging quality.    Foods allowed 1 day before test  • All meats (beef, pork, poultry, etc.) fish, eggs, cheese,              Tofu, and unsweetened peanut butter  • Non-starchy vegetables (e.g. green beans, broccoli,              Spinach, zucchini, and lettuce)  • Water, and diet soda  • Margarine, butter, and oils  • Any foods containing 5 grams or less of carbohydrate              per serving      Foods NOT allowed 1 day before test  • Peas, corn, potato, and dry beans  • Milk, and non-dairy milk drinks  • Fruit, and fruit juice  • All cereals, grains, pastas, and breads  • Sugar, honey, jams, and candy  • Gravy  • Any foods with more than 5 grams of              Carbohydrate per serving    Day of test  • Six hours prior to the PET scan, no food              or drink (except water) is allowed.  (This includes gum, mints and cough drops).    If you are diabetic, please contact us for special arrangements.    • Do NOT perform any strenuous of vigorous activity (jogging, weight lifting, etc.) for at least 24 hours prior to the scan.    • Please wear warm, comfortable clothing with no metal parts (zipper, etc.)    What to expect once you arrive at the PET/CT Center  • You will be registered at the   • You will be escorted to a  quiet prep area where your blood glucose level with be checked.  • An IV will be placed into your arm  • A small amount of a radioactive material will be injected, then the IV removed   • After resting quietly for 45-60 minutes, the PET/CT scan will be performed with you lying flat on the table with your arms above your head, lasting approximately 20-30 minutes    The appointment will take approximately 2 hours.   Please contact us at [phone] if there are any questions.

## 2020-05-11 NOTE — TELEPHONE ENCOUNTER
Patient called into the office wanting to know if their CT results have come in. Consulted with MICHAEL Martin and let patient know ANDREW Jaramillo is with a patient and I would relay a message to Ella. Patient acknowledged.

## 2020-05-11 NOTE — TELEPHONE ENCOUNTER
Patient with an ultrasound that appeared to have an increase in size of the right lobe liver lesion.  CT chest, abdomen and pelvis was completed for further evaluation.  CT shows the lesion in the right lobe of the liver within the hepatic dome measuring 3.2 x 2.9 cm in size which is not significantly changed from comparison.  However, unfortunately there is a new enhancing mass in the right lobe inferiorly measuring 2.5 x 2.3 cm in size representing a second lesion.  Again seen an ill-defined opacity in the right lung base adjacent to the hepatic lesion more prominent on this examination per reading radiologist.    Spoke with Dr. Steele regarding the findings and we will proceed with putting patient on her single agent 5-FU treatment to every 2 weeks as we have previously increased the time between each cycle.  I also reached out to Dr. West, surgeon, regarding these findings and he is aware and will get patient in to be seen.  Surgeon has requested a PET scan to be completed prior to his visit with the patient which I will order at this time.        Ct-abdomen With & W/o, Pelvis With    Result Date: 5/7/2020 5/7/2020 3:12 PM HISTORY/REASON FOR EXAM:  Follow-up hepatic lesion. Patient with metastatic rectal cancer.. TECHNIQUE/EXAM DESCRIPTION:  CT scan of the abdomen without and with contrast. Initial precontrast images were obtained from the diaphragmatic domes through the iliac crests using helical technique.  Following nonionic contrast administration in an intravenous bolus fashion, and postcontrast, thin-section helical scanning obtained from the diaphragmatic domes through the iliac crests. Additional contrast enhanced helical scanning of the pelvis was obtained from the iliac crest through the pubic symphysis. 100 mL of Omnipaque 350 nonionic contrast was administered without complication. Low dose optimization technique was utilized for this CT exam including automated exposure control and  adjustment of the mA and/or kV according to patient size. COMPARISON:  3/16/2012 FINDINGS: Precontrast images were obtained through the abdomen and pelvis. These demonstrate areas of punctate calcification involving the hepatic mass. Abdomen: The area of masslike density within the right lower lobe is again seen. This is immediately adjacent to the hepatic mass. There is fatty change of the liver. There is again seen a mass within the right lobe of the liver within the hepatic dome which is centrally low in density and measures 3.2 x 2.9 cm in size with peripheral enhancement. This is similar in appearance to prior examination. There is again seen another arterial phase enhancing mass right lobe inferiorly which measures 2.5 x 2.3 cm in size possibly representing a second lesion. The spleen, adrenal glands and kidneys are normal. The pancreas is normal. There is atherosclerotic vascular calcification. No retroperitoneal adenopathy. Pelvis: There is a right lower quadrant ostomy site. No evidence of bowel obstruction or focal inflammatory change. There has been prior hysterectomy.     1.  Again seen lesion within the right lobe of the liver within the hepatic dome which measures 3.2 x 2.9 cm in size. This is not significantly changed from comparison. There is again seen peripheral enhancement and some peripheral calcification superiorly. 2.  Second arterial phase enhancing lesion within the right lobe of the liver inferiorly measuring 2.5 x 2.3 cm in size possibly representing a second metastatic lesion. 3.  Again seen ill-defined opacity within the right lung base adjacent to the hepatic lesion, more prominent on the current examination. 4.  Surgical change consistent with colonic resection and right lower quadrant ostomy site.    Ct-chest (thorax) With    Result Date: 5/7/2020 5/7/2020 3:12 PM HISTORY/REASON FOR EXAM: Follow up CT - patient with metastatic rectal to liver with recent U/S noting increase in size of  liver lesion from previous CT - evaluate liver and questioning other progression of disease; Follow up CT - patient with metastatic  rectal to liver with recent U/S noting increase in size of liver lesion from previous CT - evaluate liver and questioning other progression of disease Follow-up metastatic rectal cancer. TECHNIQUE/EXAM DESCRIPTION: CT scan of the chest with contrast. Thin-section helical images were obtained from the lung apices through the adrenal glands following the bolus administration of 100 mL of Omnipaque 350 nonionic contrast. Low dose optimization technique was utilized for this CT exam including automated exposure control and adjustment of the mA and/or kV according to patient size. COMPARISON: 9/9/2019 FINDINGS: There is a new ill-defined mass within the right lower lobe anteriorly and inferiorly adjacent to the major fissure which measures 2.6 x 1.6 cm in size. This is located along the level of the right hemidiaphragm. There are areas of linear scarring within  the lungs bilaterally. No evidence of pleural effusion. There is a pretracheal lymph node seen measuring 7 mm in short axis dimension, stable. No enlarged mediastinal or hilar lymph nodes seen. There is again seen mild ectasia of the ascending aorta with atherosclerotic vascular calcification. There is no evidence of pericardial effusion. Hepatic mass is again seen within the dome of the liver which is further discussed on abdominal CT scan. There is a left IJ vascular access port.     1.  New ill-defined mass within the right lower lobe anteriorly and inferiorly along the hepatic fissure and at the level of the right hemidiaphragm. This measures 2.6 x 1.6 cm in size. Differential diagnosis includes metastatic disease as well as an area of inflammatory change/consolidation. 2.  Atherosclerotic vascular calcification. Fleischner Society pulmonary nodule recommendations: Low and High Risk: Consider CT at 3 months, PET/CT, or tissue  sampling. Low Risk - Minimal or absent history of smoking and of other known risk factors. High Risk - History of smoking or of other known risk factors. Note: These recommendations do not apply to lung cancer screening, patients with immunosuppression, or patients with known primary cancer. Fleischner Society 2017 Guidelines for Management of Incidentally Detected Pulmonary Nodules in Adults Fleischner Society pulmonary nodule recommendations:     Us-ruq    Result Date: 5/4/2020 5/4/2020 2:42 PM HISTORY/REASON FOR EXAM:  RUQ pain; Pain Abdominal pain TECHNIQUE/EXAM DESCRIPTION AND NUMBER OF VIEWS:  Real-time sonography of the liver and biliary tree. COMPARISON: CT abdomen 3/16/2020 FINDINGS: Liver shows mildly heterogeneous echotexture.  Complex hypoechoic solid lesion at the superior RIGHT lobe liver adjacent the dome measuring 4.9 x 3.5 x 4 cm. The liver measures 13.58 cm. Gallbladder is absent. The common duct measures 0.25 cm. The visualized pancreas is unremarkable. The visualized aorta is normal in caliber. Intrahepatic IVC is patent. The portal vein is patent with hepatopetal flow. The MPV measures 0.77 cm. The right kidney measures 8.9 cm. There is no ascites.     1.  Heterogeneous hypoechoic mass at the superior RIGHT lobe liver, apparently increased in size from prior CT. 2.  Prior cholecystectomy. 3.  No biliary dilation.      1. Malignant neoplasm of rectum (HCC)  [id number] BASE TO MID-THIGH   2. Secondary malignant neoplasm of liver (HCC)  [id number] BASE TO MID-THIGH

## 2020-05-12 NOTE — TELEPHONE ENCOUNTER
Phone Number Called: [phone]    Call outcome: Did not leave a detailed message. Requested patient to call back.    Message: Left message for patient to return call in regards to her PET Scan.    PET Scan has been moved to Thursday 05/14/2020 checking in at 12:00 pm. Her diet will begin on Wednesday 05/13/2020 at 12:30 pm.    Patient will go to Infusion on 05/14/2020 at 10:45 am to have her port accessed for the scan, and then go back at 2:30 after her PET Scan to be de accessed.

## 2020-05-12 NOTE — TELEPHONE ENCOUNTER
Patient returned call and is aware of the new date and time of her PET scan and Infusion appointments.

## 2020-05-14 ENCOUNTER — HOSPITAL ENCOUNTER (OUTPATIENT)
Dept: RADIOLOGY | Facility: MEDICAL CENTER | Age: 80
End: 2020-05-14
Attending: NURSE PRACTITIONER
Payer: MEDICARE

## 2020-05-14 ENCOUNTER — OUTPATIENT INFUSION SERVICES (OUTPATIENT)
Dept: ONCOLOGY | Facility: MEDICAL CENTER | Age: 80
End: 2020-05-14
Attending: INTERNAL MEDICINE
Payer: MEDICARE

## 2020-05-14 VITALS
DIASTOLIC BLOOD PRESSURE: 85 MMHG | RESPIRATION RATE: 18 BRPM | HEART RATE: 100 BPM | WEIGHT: 167.33 LBS | OXYGEN SATURATION: 100 % | TEMPERATURE: 98.6 F | BODY MASS INDEX: 27.88 KG/M2 | HEIGHT: 65 IN | SYSTOLIC BLOOD PRESSURE: 148 MMHG

## 2020-05-14 DIAGNOSIS — C20 MALIGNANT NEOPLASM OF RECTUM (HCC): ICD-10-CM

## 2020-05-14 DIAGNOSIS — T45.1X5A CHEMOTHERAPY-INDUCED NEUTROPENIA (HCC): ICD-10-CM

## 2020-05-14 DIAGNOSIS — C78.7 SECONDARY MALIGNANT NEOPLASM OF LIVER (HCC): ICD-10-CM

## 2020-05-14 DIAGNOSIS — D70.1 CHEMOTHERAPY-INDUCED NEUTROPENIA (HCC): ICD-10-CM

## 2020-05-14 PROCEDURE — A9552 F18 FDG: HCPCS

## 2020-05-14 PROCEDURE — 700111 HCHG RX REV CODE 636 W/ 250 OVERRIDE (IP)

## 2020-05-14 PROCEDURE — 96523 IRRIG DRUG DELIVERY DEVICE: CPT

## 2020-05-14 PROCEDURE — A4212 NON CORING NEEDLE OR STYLET: HCPCS

## 2020-05-14 RX ADMIN — HEPARIN 500 UNITS: 100 SYRINGE at 10:59

## 2020-05-14 RX ADMIN — HEPARIN 500 UNITS: 100 SYRINGE at 13:45

## 2020-05-14 ASSESSMENT — PAIN SCALES - GENERAL: PAINLEVEL: NO PAIN

## 2020-05-14 ASSESSMENT — FIBROSIS 4 INDEX: FIB4 SCORE: 1.38

## 2020-05-14 NOTE — PROGRESS NOTES
Patient arrived ambulatory to hospitals for port de-access.  She completed her PET scan, denies any acute changes.  Port flushed, heparin instilled, and de-accessed per protocol.  Gauze/tape dressing applied.  Confirmed next appointment and she ambulated out of clinic in no apparent distress.

## 2020-05-14 NOTE — PROGRESS NOTES
Patient here for port access prior to PET scan. Left chest port accessed using sterile technique; brisk blood return noted. Heparin instilled per MAR. Port left accessed for PET scan. Patient to return after her PET scan to have port de-accessed. Next appointment scheduled. Discharged to self care; no apparent distress noted.

## 2020-05-18 ENCOUNTER — TELEPHONE (OUTPATIENT)
Dept: HEMATOLOGY ONCOLOGY | Facility: MEDICAL CENTER | Age: 80
End: 2020-05-18

## 2020-05-18 NOTE — TELEPHONE ENCOUNTER
PET/CT results do show a necrotic mass in the right hepatic dome with increased uptake, with an SUV of 9.6.  The new additional hypermetabolic mass in the inferior right hepatic lobe is also consistent with metastatic disease with an increased uptake at 10.1.  Patient is being seen by surgeon, Dr. West tomorrow morning.  She is also scheduled for her chemotherapy tomorrow as we did change her chemo back to every 2 weeks.    Yoanna the results with the patient on the phone and she is aware of all of her upcoming appointments.  Yoanna with patient that we will wait and see what Dr. ROJAS's recommendations are and proceed from there.     Patient appreciative of the phone call.        [id number] Base To Mid-thigh    Result Date: 5/14/2020 5/14/2020 12:20 PM HISTORY/REASON FOR EXAM:  follow up metastatic rectal carcinoma with new liver lesion noted on CT; follow up metastatic rectal carcinoma with new liver lesion noted on CT TECHNIQUE/EXAM DESCRIPTION AND NUMBER OF VIEWS: PET body imaging. Initially, 15.5 mCi F-18 FDG was administered intravenously under standardized conditions. Approximately 45 minutes after FDG administration, the patient was placed in the supine position on the PET CT table. Blood glucose level was 77 mg/dL. Low dose spiral CT imaging was performed from the skull base to the mid thighs. PET imaging was then performed from the skull base to the mid thighs. CT images, PET images, and PET/CT fused images were reviewed on a PACS 3D workstation. The limited non-contrast CT data are used primarily for attenuation correction and anatomic correlation.  Evaluation of solid organs and bowel are especially limited utilizing this technique. A low dose CT was obtained of the same area without IV contrast for attenuation correction and coregistration, not for a diagnostic scan. COMPARISON: CT 5/7/2020. FINDINGS: VISUALIZED BRAIN: Normal metabolic activity. HEAD AND NECK: Normal physiologic uptake. CHEST:  Background blood pool activity shows average SUV of 1.8. Lungs show no hypermetabolic pulmonary nodules. There is no hypermetabolic hilar, mediastinal, or axillary lymphadenopathy. Activity in the tubing anterior to the patient lower chest. ABDOMEN AND PELVIS: Background liver activity shows average SUV of 3.2. There is a necrotic mass in the right hepatic dome (SUV max 9.6). Additional mass in the inferior right hepatic lobe also demonstrate increased uptake (SUV 10.1) There is normal, uniform metabolic activity in the spleen, adrenal glands, kidneys. There is no hypermetabolic mesenteric, retroperitoneal, iliac, or inguinal lymphadenopathy. Nonspecific physiologic activity in the colon and intestine is noted. Right upper quadrant ostomy with physiologic activity. VISUALIZED MUSCULOSKELETAL SYSTEM: No hypermetabolic activity to suggest osteolytic metastases or sclerosis to suggest osteoblastic metastases.     1. Necrotic mass in the right hepatic dome with increased uptake, in keeping with viable metastasis. 2. Additional hypermetabolic mass in the inferior right hepatic lobe is consistent with metastasis as well. 3. No other increased uptake identified.

## 2020-05-18 NOTE — PROGRESS NOTES
Subjective:   5/19/2020  7:24 AM  Primary care physician:Manan Quiñonez M.D.  Medical Oncologist: Eric Hardy M.D.    Chief Complaint: No chief complaint on file.    Diagnosis:   1. Secondary malignant neoplasm of liver (HCC)     2. Malignant neoplasm of rectum (HCC)         History of presenting illness:  Karlene Walters  is a pleasant 80 y.o. year old female who presented with what appears to be 2 new lesions in the right lobe of the liver.  The patient has been battling metastatic colon cancer to the right lobe of the liver.  She has had 2 ablations as well as an yttrium-90 treatment and she has been fairly static for the last 6 years.  Unfortunately on her most recent imaging there was a new lesion at the center of the right lobe measuring approximately 2 and half to 3 cm in size.  She also had some the right lobe of the liver because of this, the patient underwent a PET scan which revealed that both of these lesions lateral to the ablation cavity in segment 7 lit up as well as the central liver lesion that is new since segment 6 and 7 are positive for metastatic disease.  There is no other extrahepatic disease.  Patient has been on chronic chemotherapy monthly.  She also is on Xarelto for previous DVT and PE back in 2015.  She has had no other symptoms with regards to DVT or PE.  In any event she is here with her  discuss neck steps in her care.  She has had no hospitalizations.  She is incredibly active for an 80-year-old.  She exercises regularly and her ostomy is functioning well.  Her main other complaint is rectal prolapse.  She is here to discuss management of these 2 liver lesions.  I have personally reviewed the patient's most recent PET scan, CT scan, and her past CT scans assessing growth.  I also reviewed the patient's previous notes and chemotherapy schedule.    Past Medical History:   Diagnosis Date   • Arrhythmia     occasional   • Blood clotting disorder (HCC) 08/2015    bilat PE    •  Blood transfusion 1957   • Breath shortness     no O2 with moderate exertion   • Cancer (HCC) 09/2012    rectal cancer,  Liver CA-2015   • CATARACT     removed b/l   • Chemotherapy-induced neutropenia (HCC) 9/28/2016   • Chickenpox    • Colon cancer (HCC)    • Dental disorder     dentures UPPERS AND LOWERS   • Elevated alkaline phosphatase level 11/15/2017   • Emphysema of lung (HCC)    • Fall    • Azeri measles    • Heart murmur     as a child   • Hemorrhagic disorder (HCC)     on Xarelto    • Hypercholesteremia    • Hypertension     no meds currently    • Ileostomy in place (HCC)    • Indigestion    • Influenza    • Lightheadedness 9/17/2015   • Mumps    • Obstruction     ileostomy   • Other specified symptom associated with female genital organs     HYSTERECTOMY 1993   • Pneumonia 05/2017    tx'd with antibx   • Pulmonary embolism (HCC)    • 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)    • Renal insufficiency 3/14/2016   • Restless legs 5/6/2020   • Routine general medical examination at a health care facility 3/14/2016    3/14/16   • Seasonal allergies    • Swelling of both ankles     Lasix PRN   • Tonsillitis      Past Surgical History:   Procedure Laterality Date   • HEPATIC ABLATION LAPAROSCOPIC  11/15/2018    Procedure: HEPATIC ABLATION LAPAROSCOPIC.- SEGMENT 7/8;  Surgeon: Kit West M.D.;  Location: SURGERY [facility];  Service: General   • COLONOSCOPY WITH BIOPSY  8/23/2015    Procedure: COLONOSCOPY WITH BIOPSY;  Surgeon: Wilbur Glasgow M.D.;  Location: ENDOSCOPY [facility];  Service:    • HEPATIC ABLATION LAPAROSCOPIC  7/6/2015    Procedure: HEPATIC ABLATION LAPAROSCOPIC.;  Surgeon: Kit West M.D.;  Location: SURGERY [facility];  Service:    • CATH PLACEMENT Left 7/6/2015    Procedure: CATH PLACEMENT CEPHALIC POWERPORT;  Surgeon: Kit West M.D.;  Location: SURGERY [facility];  Service:    • FISTULA IN ANO REPAIR  1/28/2015    Performed  by Kolton Montgomery M.D. at SURGERY [facility]   • PERINEAL PROCEDURE  1/28/2015    Performed by Kolton Montgomery M.D. at SURGERY [facility]   • FISTULA IN ANO REPAIR  10/15/2014    Performed by Kolton Montgomery M.D. at [facility]   • PERINEAL PROCEDURE  10/15/2014    Performed by Kolton Montgomery M.D. at SURGERY [facility]   • IRRIGATION & DEBRIDEMENT GENERAL  2/19/2014    Performed by Kolton Montgomery M.D. at SURGERY [facility]   • FLAP GRAFT  2/19/2014    Performed by Kolton Montgomery M.D. at [facility]   • PERINEAL PROCEDURE  12/18/2013    Performed by Kolton Montgomery M.D. at [facility]   • MYOCUTANEOUS FLAP  12/18/2013    Performed by Kolton Montgomery M.D. at [facility]   • IRRIGATION & DEBRIDEMENT GENERAL  10/23/2013    Performed by Kolton Montgomery M.D. at SURGERY [facility]   • FISTULA IN ANO REPAIR  9/4/2013    Performed by Kolton Montgomery M.D. at [facility]   • FLAP ROTATION  9/4/2013    Performed by Kolton Montgomery M.D. at [facility]   • RECOVERY  8/26/2013    Performed by Cath-Recovery Surgery at [facility] SAME DAY [facility]   • BIOPSY GENERAL  7/17/2013    Performed by Kolton Montgomery M.D. at [facility]   • PROCTOSCOPY  7/17/2013    Performed by Kolton Montgomery M.D. at [facility]   • FISTULA IN ANO REPAIR  2/27/2013    Performed by Kolton Montgomery M.D. at [facility]   • SIGMOIDOSCOPY  2/27/2013    Performed by Kolton Montgomery M.D. at [facility]   • LAPAROSCOPY  10/8/2012    Performed by Kolton Montgomery M.D. at [facility]   • ILEO LOOP DIVERSION  10/8/2012    Performed by Kolton Montgoemry M.D. at SURGERY [facility]   • COLECTOMY  9/26/2012    Performed by Kolton Montgomery M.D. at SURGERY [facility]S   • COLONOSCOPY FLEX W/ENDO US  9/20/2012    Performed by Harshil Bolton M.D. at SURGERY [facility] ORS   • OTHER  2009    left eye torn retina repair   • CATARACT EXTRACTION  "WITH IOL  2004    bilateral   • ABDOMINAL HYSTERECTOMY TOTAL  1983   • CYST EXCISION  1972    ovarian   • COLON RESECTION     • EYE SURGERY      cataracts   • HYSTERECTOMY LAPAROSCOPY     • PB REMV 2ND CATARACT,CORN-SCLER SECTN     • TONSILLECTOMY       Allergies   Allergen Reactions   • Oxaliplatin Anaphylaxis   • Atorvastatin    • Codeine      \"gets drunk\"   • Pcn [Penicillins] Itching   • Sulfa Drugs Itching   • Tape Rash     PAPER TAPE OK     Outpatient Encounter Medications as of 5/19/2020   Medication Sig Dispense Refill   • carbidopa-levodopa (SINEMET)  MG Tab Take 1 Tab by mouth every evening as needed. Following chermotherapy 10 Tab 3   • furosemide (LASIX) 40 MG Tab Take 1 Tab by mouth every day. 100 Tab 3   • potassium chloride ER (KLOR-CON) 10 MEQ tablet Take 1 Tab by mouth every day. 100 Tab 3   • rivaroxaban (XARELTO) 10 MG Tab tablet Take 1 Tab by mouth every day. 90 Tab 1   • STIOLTO RESPIMAT 2.5-2.5 MCG/ACT Aero Soln TAKE 2 PUFFS EVERY DAY 1 Inhaler 0   • albuterol (PROAIR HFA) 108 (90 Base) MCG/ACT Aero Soln inhalation aerosol Inhale 2 Puffs by mouth every four hours as needed for Shortness of Breath (wheezing). 1 Inhaler 11   • lidocaine-prilocaine (EMLA) 2.5-2.5 % Cream APPLY A THICK FILM OVER THE PORT ACCESS SITE PRIOR TO ACCESS 30 g 3   • ondansetron (ZOFRAN) 4 MG Tab tablet Take 1 Tab by mouth as needed. 30 Tab 3   • metronidazole (METROCREAM) 0.75 % cream Apply 1 Each to affected area(s) 2 times a day.     • loperamide (IMODIUM) 2 MG Cap Take 2 mg by mouth 4 times a day as needed for Diarrhea.     • cyanocobalamin (VITAMIN B-12) 500 MCG Tab Take 1,000 mcg by mouth every day.     • Multiple Vitamins-Minerals (CENTRUM SILVER PO) Take 1 Tab by mouth every day.     • Cholecalciferol (VITAMIN D3) 400 UNITS CAPS Take 1 Cap by mouth every day.     • loratadine (CLARITIN) 10 MG TABS Take 10 mg by mouth every day. Indications: Hayfever       No facility-administered encounter medications on file " as of 5/19/2020.      Social History     Socioeconomic History   • Marital status:      Spouse name: Not on file   • Number of children: Not on file   • Years of education: Not on file   • Highest education level: Not on file   Occupational History   • Not on file   Social Needs   • Financial resource strain: Not on file   • Food insecurity     Worry: Not on file     Inability: Not on file   • Transportation needs     Medical: Not on file     Non-medical: Not on file   Tobacco Use   • Smoking status: Never Smoker   • Smokeless tobacco: Never Used   Substance and Sexual Activity   • Alcohol use: No     Comment: very rare   • Drug use: No   • Sexual activity: Never     Partners: Male     Birth control/protection: Post-Menopausal   Lifestyle   • Physical activity     Days per week: Not on file     Minutes per session: Not on file   • Stress: Not on file   Relationships   • Social connections     Talks on phone: Not on file     Gets together: Not on file     Attends Hinduism service: Not on file     Active member of club or organization: Not on file     Attends meetings of clubs or organizations: Not on file     Relationship status: Not on file   • Intimate partner violence     Fear of current or ex partner: Not on file     Emotionally abused: Not on file     Physically abused: Not on file     Forced sexual activity: Not on file   Other Topics Concern   • Primary/coprimary nurse & associates Not Asked   • Family contact information Not Asked   • OK to release patient information to the following Not Asked   • Patient preferred routine/Privacy concerns Not Asked   • Patient likes and dislikes Not Asked   • Participating In Research Study Not Asked   • Miscellaneous Not Asked   Social History Narrative   • Not on file      Social History     Tobacco Use   Smoking Status Never Smoker   Smokeless Tobacco Never Used     Social History     Substance and Sexual Activity   Alcohol Use No    Comment: very rare     Social  "History     Substance and Sexual Activity   Drug Use No        Family History   Problem Relation Age of Onset   • Heart Disease Mother    • Heart Failure Mother    • Hypertension Mother    • Hyperlipidemia Mother    • Cancer Mother    • Cancer Maternal Aunt 60        breast ca   • Lung Disease Brother         COPD/Emphysema/Smoker   • Cancer Brother         prostate cancer   • Alcohol/Drug Brother         Alcohol   • Kidney Disease Father         renal failure       Review of Systems   Respiratory: Positive for shortness of breath.    All other systems reviewed and are negative.       Objective:   /62 (BP Location: Right arm, Patient Position: Sitting, BP Cuff Size: Adult)   Pulse 94   Temp 35.9 °C (96.6 °F) (Temporal)   Ht 1.651 m (5' 5\")   Wt 74.4 kg (164 lb)   LMP  (LMP Unknown)   SpO2 98%   BMI 27.29 kg/m²     Physical Exam   Constitutional: She is oriented to person, place, and time. She appears well-developed and well-nourished.   HENT:   Head: Normocephalic and atraumatic.   Eyes: Pupils are equal, round, and reactive to light. Conjunctivae are normal.   Neck: Normal range of motion. Neck supple.   Cardiovascular: Normal rate and regular rhythm.   Pulmonary/Chest: Effort normal and breath sounds normal.   Abdominal: Soft. Bowel sounds are normal. There is abdominal tenderness.   Slight right upper quadrant discomfort in the subcostal margin   Musculoskeletal: Normal range of motion.   Neurological: She is alert and oriented to person, place, and time.   Skin: Skin is warm and dry.   Psychiatric: She has a normal mood and affect. Her behavior is normal. Judgment and thought content normal.   Nursing note and vitals reviewed.      Labs:  Results for KANG HALL (MRN [id number]) as of 5/18/2020 12:40   Ref. Range 5/5/2020 07:20   WBC Latest Ref Range: 4.8 - 10.8 K/uL 5.3   RBC Latest Ref Range: 4.20 - 5.40 M/uL 4.83   Hemoglobin Latest Ref Range: 12.0 - 16.0 g/dL 12.7   Hematocrit Latest Ref " Range: 37.0 - 47.0 % 41.5   MCV Latest Ref Range: 81.4 - 97.8 fL 85.9   MCH Latest Ref Range: 27.0 - 33.0 pg 26.3 (L)   MCHC Latest Ref Range: 33.6 - 35.0 g/dL 30.6 (L)   RDW Latest Ref Range: 35.9 - 50.0 fL 56.7 (H)   Platelet Count Latest Ref Range: 164 - 446 K/uL 285   MPV Latest Ref Range: 9.0 - 12.9 fL 10.1   Neutrophils-Polys Latest Ref Range: 44.00 - 72.00 % 54.90   Neutrophils (Absolute) Latest Ref Range: 2.00 - 7.15 K/uL 2.92   Lymphocytes Latest Ref Range: 22.00 - 41.00 % 26.50   Lymphs (Absolute) Latest Ref Range: 1.00 - 4.80 K/uL 1.41   Monocytes Latest Ref Range: 0.00 - 13.40 % 13.00   Monos (Absolute) Latest Ref Range: 0.00 - 0.85 K/uL 0.69   Eosinophils Latest Ref Range: 0.00 - 6.90 % 4.50   Eos (Absolute) Latest Ref Range: 0.00 - 0.51 K/uL 0.24   Basophils Latest Ref Range: 0.00 - 1.80 % 0.90   Baso (Absolute) Latest Ref Range: 0.00 - 0.12 K/uL 0.05   Immature Granulocytes Latest Ref Range: 0.00 - 0.90 % 0.20   Immature Granulocytes (abs) Latest Ref Range: 0.00 - 0.11 K/uL 0.01   Nucleated RBC Latest Units: /100 WBC 0.00   NRBC (Absolute) Latest Units: K/uL 0.00   Sodium Latest Ref Range: 135 - 145 mmol/L 135   Potassium Latest Ref Range: 3.6 - 5.5 mmol/L 3.9   Chloride Latest Ref Range: 96 - 112 mmol/L 101   Co2 Latest Ref Range: 20 - 33 mmol/L 20   Anion Gap Latest Ref Range: 7.0 - 16.0  14.0   Glucose Latest Ref Range: 65 - 99 mg/dL 99   Bun Latest Ref Range: 8 - 22 mg/dL 18   Creatinine Latest Ref Range: 0.50 - 1.40 mg/dL 0.89   GFR If  Latest Ref Range: >60 mL/min/1.73 m 2 >60   GFR If Non  Latest Ref Range: >60 mL/min/1.73 m 2 >60   Calcium Latest Ref Range: 8.5 - 10.5 mg/dL 9.4   AST(SGOT) Latest Ref Range: 12 - 45 U/L 22   ALT(SGPT) Latest Ref Range: 2 - 50 U/L 20   Alkaline Phosphatase Latest Ref Range: 30 - 99 U/L 161 (H)   Total Bilirubin Latest Ref Range: 0.1 - 1.5 mg/dL 0.4   Albumin Latest Ref Range: 3.2 - 4.9 g/dL 3.7   Total Protein Latest Ref Range:  6.0 - 8.2 g/dL 6.8   Globulin Latest Ref Range: 1.9 - 3.5 g/dL 3.1   A-G Ratio Latest Units: g/dL 1.2   Glycohemoglobin Latest Ref Range: 0.0 - 5.6 % 5.5   Estim. Avg Glu Latest Units: mg/dL 111   Cholesterol,Tot Latest Ref Range: 100 - 199 mg/dL 154   Triglycerides Latest Ref Range: 0 - 149 mg/dL 156 (H)   HDL Latest Ref Range: >=40 mg/dL 46   LDL Latest Ref Range: <100 mg/dL 77   Carcinoembryonic Antigen Latest Ref Range: 0.0 - 3.0 ng/mL 1.5       Imaging:   Per my read, PET 5/14/20    IMPRESSION:        1. Necrotic mass in the right hepatic dome with increased uptake, in keeping with viable metastasis.     2. Additional hypermetabolic mass in the inferior right hepatic lobe is consistent with metastasis as well.     3. No other increased uptake identified.    CT CHEST 5/7/20    IMPRESSION:     1.  New ill-defined mass within the right lower lobe anteriorly and inferiorly along the hepatic fissure and at the level of the right hemidiaphragm. This measures 2.6 x 1.6 cm in size. Differential diagnosis includes metastatic disease as well as an   area of inflammatory change/consolidation.     2.  Atherosclerotic vascular calcification.       CT 5/7/20    IMPRESSION:     1.  Again seen lesion within the right lobe of the liver within the hepatic dome which measures 3.2 x 2.9 cm in size. This is not significantly changed from comparison. There is again seen peripheral enhancement and some peripheral calcification   superiorly.     2.  Second arterial phase enhancing lesion within the right lobe of the liver inferiorly measuring 2.5 x 2.3 cm in size possibly representing a second metastatic lesion.     3.  Again seen ill-defined opacity within the right lung base adjacent to the hepatic lesion, more prominent on the current examination.     4.  Surgical change consistent with colonic resection and right lower quadrant ostomy site.      Pathology:  NA    Procedures: 11/15/18    Ablations to segment 7 of the liver x3, each  being 4   minute at 140 yanes giving us a 4x5 cm ablation cavity overlapping the region   of the lateral posterior aspect and inferior aspect of the previous ablation   cavity.    Diagnosis:     1. Secondary malignant neoplasm of liver (HCC)     2. Malignant neoplasm of rectum (HCC)             Medical Decision Making:  Today's Assessment / Status / Plan:       Present findings, the patient now has a new recurrence at the medial portion of the right lobe as well as a recurrence to the lateral edge of her previous ablation.  Because of this my recommendation is to proceed with a right hepatectomy.  The patient has had hypertrophy of her left lobe of the liver following her yttrium-90 treatment is done extremely well and her left lobe appears to be free of tumor for the last 6 years.  Patient also clearly understands that due to the yttrium treatments as well as her multiple ablations there is a potential that we would not be able to mobilize the right lobe off of the diaphragm.  If this is the case then we would aim to do a aggressive ablation of the right lobe of liver tumors.  Patient clearly understands her goals to resect but the patient understands that due to her multiple therapies over the last 6 years difficulties.  I did discuss the risks of bleeding, infection, bile leak, unlikely there will be any sign of liver failure due to the hypertrophy of her left lobe of the liver.  The patient is on Xarelto and we have requested she stop it 4 days before.  We discussed the use of an epidural, ICU stay, the possibility of a hospital stay between 3 and 5 days.  She has agreed the above plan should be scheduled on May 28, 2020.    I, Dr. West have entered, reviewed and confirmed the above diagnoses related to this patient on this date of service, 5/19/2020  7:24 AM.    She agreed and verbalized her agreement and understanding with the current plan. I answered all questions and concerns she has at this time and  advised her to call at any time in the interim with questions or concerns in regards to her care.    Thank you for allowing me to participate in her care, I will continue to follow closely.       Please note that this dictation was created using voice recognition software. I have made every reasonable attempt to correct obvious errors, but I expect that there are errors of grammar and possibly content that I did not discover before finalizing the note.     Thank you for this consultation and allowing me to participate in your patient's care. If I can be of further service please contact my office.

## 2020-05-19 ENCOUNTER — APPOINTMENT (OUTPATIENT)
Dept: ONCOLOGY | Facility: MEDICAL CENTER | Age: 80
End: 2020-05-19
Attending: INTERNAL MEDICINE
Payer: MEDICARE

## 2020-05-19 ENCOUNTER — OFFICE VISIT (OUTPATIENT)
Dept: SURGICAL ONCOLOGY | Facility: MEDICAL CENTER | Age: 80
End: 2020-05-19
Payer: MEDICARE

## 2020-05-19 VITALS
DIASTOLIC BLOOD PRESSURE: 62 MMHG | HEART RATE: 94 BPM | OXYGEN SATURATION: 98 % | HEIGHT: 65 IN | SYSTOLIC BLOOD PRESSURE: 116 MMHG | BODY MASS INDEX: 27.32 KG/M2 | WEIGHT: 164 LBS | TEMPERATURE: 96.6 F

## 2020-05-19 DIAGNOSIS — Z86.711 HISTORY OF PULMONARY EMBOLUS (PE): ICD-10-CM

## 2020-05-19 DIAGNOSIS — K62.3 INCOMPLETE RECTAL PROLAPSE: ICD-10-CM

## 2020-05-19 DIAGNOSIS — C20 MALIGNANT NEOPLASM OF RECTUM (HCC): ICD-10-CM

## 2020-05-19 DIAGNOSIS — C78.7 SECONDARY MALIGNANT NEOPLASM OF LIVER (HCC): ICD-10-CM

## 2020-05-19 PROCEDURE — 99215 OFFICE O/P EST HI 40 MIN: CPT | Performed by: SURGERY

## 2020-05-19 ASSESSMENT — FIBROSIS 4 INDEX: FIB4 SCORE: 1.38

## 2020-05-19 ASSESSMENT — ENCOUNTER SYMPTOMS: SHORTNESS OF BREATH: 1

## 2020-05-21 ENCOUNTER — APPOINTMENT (OUTPATIENT)
Dept: ONCOLOGY | Facility: MEDICAL CENTER | Age: 80
End: 2020-05-21
Attending: INTERNAL MEDICINE
Payer: MEDICARE

## 2020-05-25 ENCOUNTER — OFFICE VISIT (OUTPATIENT)
Dept: ADMISSIONS | Facility: MEDICAL CENTER | Age: 80
DRG: 407 | End: 2020-05-25
Attending: SURGERY
Payer: MEDICARE

## 2020-05-25 DIAGNOSIS — Z01.810 PRE-OPERATIVE CARDIOVASCULAR EXAMINATION: ICD-10-CM

## 2020-05-25 DIAGNOSIS — Z01.812 PRE-OPERATIVE LABORATORY EXAMINATION: ICD-10-CM

## 2020-05-25 LAB
COVID ORDER STATUS COVID19: NORMAL
EKG IMPRESSION: NORMAL

## 2020-05-25 PROCEDURE — C9803 HOPD COVID-19 SPEC COLLECT: HCPCS

## 2020-05-25 PROCEDURE — 93010 ELECTROCARDIOGRAM REPORT: CPT | Performed by: INTERNAL MEDICINE

## 2020-05-25 PROCEDURE — 93005 ELECTROCARDIOGRAM TRACING: CPT

## 2020-05-25 ASSESSMENT — FIBROSIS 4 INDEX: FIB4 SCORE: 1.38

## 2020-05-27 LAB
SARS-COV-2 RNA RESP QL NAA+PROBE: NOT DETECTED
SPECIMEN SOURCE: NORMAL

## 2020-05-27 NOTE — OR NURSING
COVID-19 Pre-surgery screenin. Do you have an undiagnosed respiratory illness or symptoms such as coughing or sneezing? No (Yes/No)      2. Do you have an unexplained fever greater than 100.4 degrees Fahrenheit or 38 degrees Celsius?     no (Yes/No)    3. Have you had direct exposure to a patient who tested positive for Covid-19?    no (Yes/No)    4. Have you traveled outside [facility] in the last 14 days?No    5. Have you had any lost of taste, smell, N/V or sore throat? No    Patient has been informed of no visitor policy and asked to wear a mask upon entering the hospital    YES (Yes/No)

## 2020-05-28 ENCOUNTER — ANESTHESIA (OUTPATIENT)
Dept: SURGERY | Facility: MEDICAL CENTER | Age: 80
DRG: 407 | End: 2020-05-28
Payer: MEDICARE

## 2020-05-28 ENCOUNTER — ANESTHESIA EVENT (OUTPATIENT)
Dept: SURGERY | Facility: MEDICAL CENTER | Age: 80
DRG: 407 | End: 2020-05-28
Payer: MEDICARE

## 2020-05-28 ENCOUNTER — HOSPITAL ENCOUNTER (INPATIENT)
Facility: MEDICAL CENTER | Age: 80
LOS: 4 days | DRG: 407 | End: 2020-06-01
Attending: SURGERY | Admitting: SURGERY
Payer: MEDICARE

## 2020-05-28 DIAGNOSIS — C20 MALIGNANT NEOPLASM OF RECTUM (HCC): Chronic | ICD-10-CM

## 2020-05-28 DIAGNOSIS — C78.7 SECONDARY MALIGNANT NEOPLASM OF LIVER (HCC): Chronic | ICD-10-CM

## 2020-05-28 LAB
ABO + RH BLD: NORMAL
ABO GROUP BLD: NORMAL
BLD GP AB SCN SERPL QL: NORMAL
INR PPP: 1.01 (ref 0.87–1.13)
PATHOLOGY CONSULT NOTE: NORMAL
PROTHROMBIN TIME: 13.5 SEC (ref 12–14.6)
RH BLD: NORMAL

## 2020-05-28 PROCEDURE — 0FN00ZZ RELEASE LIVER, OPEN APPROACH: ICD-10-PCS | Performed by: SURGERY

## 2020-05-28 PROCEDURE — P9045 ALBUMIN (HUMAN), 5%, 250 ML: HCPCS | Mod: JG | Performed by: ANESTHESIOLOGY

## 2020-05-28 PROCEDURE — 88305 TISSUE EXAM BY PATHOLOGIST: CPT

## 2020-05-28 PROCEDURE — 160036 HCHG PACU - EA ADDL 30 MINS PHASE I: Performed by: SURGERY

## 2020-05-28 PROCEDURE — 47380 OPEN ABLATE LIVER TUMOR RF: CPT | Performed by: SURGERY

## 2020-05-28 PROCEDURE — 160009 HCHG ANES TIME/MIN: Performed by: SURGERY

## 2020-05-28 PROCEDURE — 160041 HCHG SURGERY MINUTES - EA ADDL 1 MIN LEVEL 4: Performed by: SURGERY

## 2020-05-28 PROCEDURE — 501445 HCHG STAPLER, SKIN DISP: Performed by: SURGERY

## 2020-05-28 PROCEDURE — 160002 HCHG RECOVERY MINUTES (STAT): Performed by: SURGERY

## 2020-05-28 PROCEDURE — 700111 HCHG RX REV CODE 636 W/ 250 OVERRIDE (IP): Performed by: ANESTHESIOLOGY

## 2020-05-28 PROCEDURE — 700105 HCHG RX REV CODE 258: Performed by: ANESTHESIOLOGY

## 2020-05-28 PROCEDURE — 160048 HCHG OR STATISTICAL LEVEL 1-5: Performed by: SURGERY

## 2020-05-28 PROCEDURE — 700101 HCHG RX REV CODE 250: Performed by: SURGERY

## 2020-05-28 PROCEDURE — 86900 BLOOD TYPING SEROLOGIC ABO: CPT

## 2020-05-28 PROCEDURE — 0F520ZZ DESTRUCTION OF LEFT LOBE LIVER, OPEN APPROACH: ICD-10-PCS | Performed by: SURGERY

## 2020-05-28 PROCEDURE — 0F510ZZ DESTRUCTION OF RIGHT LOBE LIVER, OPEN APPROACH: ICD-10-PCS | Performed by: SURGERY

## 2020-05-28 PROCEDURE — A9270 NON-COVERED ITEM OR SERVICE: HCPCS | Performed by: SURGERY

## 2020-05-28 PROCEDURE — 86901 BLOOD TYPING SEROLOGIC RH(D): CPT

## 2020-05-28 PROCEDURE — 500424 HCHG DRESSING, AIRSTRIP: Performed by: SURGERY

## 2020-05-28 PROCEDURE — 770001 HCHG ROOM/CARE - MED/SURG/GYN PRIV*

## 2020-05-28 PROCEDURE — 0BBT0ZZ EXCISION OF DIAPHRAGM, OPEN APPROACH: ICD-10-PCS | Performed by: SURGERY

## 2020-05-28 PROCEDURE — 700101 HCHG RX REV CODE 250: Performed by: ANESTHESIOLOGY

## 2020-05-28 PROCEDURE — 36415 COLL VENOUS BLD VENIPUNCTURE: CPT

## 2020-05-28 PROCEDURE — 62320 NJX INTERLAMINAR CRV/THRC: CPT | Performed by: SURGERY

## 2020-05-28 PROCEDURE — 160035 HCHG PACU - 1ST 60 MINS PHASE I: Performed by: SURGERY

## 2020-05-28 PROCEDURE — 85610 PROTHROMBIN TIME: CPT

## 2020-05-28 PROCEDURE — 501838 HCHG SUTURE GENERAL: Performed by: SURGERY

## 2020-05-28 PROCEDURE — 39560 RESECT DIAPHRAGM SIMPLE: CPT | Performed by: SURGERY

## 2020-05-28 PROCEDURE — 110454 HCHG SHELL REV 250: Performed by: SURGERY

## 2020-05-28 PROCEDURE — 700105 HCHG RX REV CODE 258: Performed by: SURGERY

## 2020-05-28 PROCEDURE — 160029 HCHG SURGERY MINUTES - 1ST 30 MINS LEVEL 4: Performed by: SURGERY

## 2020-05-28 PROCEDURE — 86850 RBC ANTIBODY SCREEN: CPT

## 2020-05-28 PROCEDURE — 700111 HCHG RX REV CODE 636 W/ 250 OVERRIDE (IP): Performed by: SURGERY

## 2020-05-28 PROCEDURE — 700102 HCHG RX REV CODE 250 W/ 637 OVERRIDE(OP): Performed by: SURGERY

## 2020-05-28 RX ORDER — CEFAZOLIN SODIUM 2 G/100ML
2 INJECTION, SOLUTION INTRAVENOUS EVERY 8 HOURS
Status: COMPLETED | OUTPATIENT
Start: 2020-05-28 | End: 2020-05-29

## 2020-05-28 RX ORDER — ONDANSETRON 2 MG/ML
INJECTION INTRAMUSCULAR; INTRAVENOUS PRN
Status: DISCONTINUED | OUTPATIENT
Start: 2020-05-28 | End: 2020-05-28 | Stop reason: SURG

## 2020-05-28 RX ORDER — SODIUM CHLORIDE, SODIUM LACTATE, POTASSIUM CHLORIDE, CALCIUM CHLORIDE 600; 310; 30; 20 MG/100ML; MG/100ML; MG/100ML; MG/100ML
INJECTION, SOLUTION INTRAVENOUS CONTINUOUS
Status: ACTIVE | OUTPATIENT
Start: 2020-05-28 | End: 2020-05-28

## 2020-05-28 RX ORDER — DIPHENHYDRAMINE HYDROCHLORIDE 50 MG/ML
25 INJECTION INTRAMUSCULAR; INTRAVENOUS EVERY 6 HOURS PRN
Status: DISCONTINUED | OUTPATIENT
Start: 2020-05-28 | End: 2020-06-01 | Stop reason: HOSPADM

## 2020-05-28 RX ORDER — SCOLOPAMINE TRANSDERMAL SYSTEM 1 MG/1
1 PATCH, EXTENDED RELEASE TRANSDERMAL
Status: DISCONTINUED | OUTPATIENT
Start: 2020-05-28 | End: 2020-05-29

## 2020-05-28 RX ORDER — ONDANSETRON 2 MG/ML
4 INJECTION INTRAMUSCULAR; INTRAVENOUS EVERY 4 HOURS PRN
Status: DISCONTINUED | OUTPATIENT
Start: 2020-05-28 | End: 2020-06-01 | Stop reason: HOSPADM

## 2020-05-28 RX ORDER — LIDOCAINE HYDROCHLORIDE 20 MG/ML
INJECTION, SOLUTION EPIDURAL; INFILTRATION; INTRACAUDAL; PERINEURAL PRN
Status: DISCONTINUED | OUTPATIENT
Start: 2020-05-28 | End: 2020-05-28 | Stop reason: SURG

## 2020-05-28 RX ORDER — CEFAZOLIN SODIUM 1 G/3ML
INJECTION, POWDER, FOR SOLUTION INTRAMUSCULAR; INTRAVENOUS PRN
Status: DISCONTINUED | OUTPATIENT
Start: 2020-05-28 | End: 2020-05-28 | Stop reason: SURG

## 2020-05-28 RX ORDER — DEXAMETHASONE SODIUM PHOSPHATE 4 MG/ML
INJECTION, SOLUTION INTRA-ARTICULAR; INTRALESIONAL; INTRAMUSCULAR; INTRAVENOUS; SOFT TISSUE PRN
Status: DISCONTINUED | OUTPATIENT
Start: 2020-05-28 | End: 2020-05-28 | Stop reason: SURG

## 2020-05-28 RX ORDER — LIDOCAINE HYDROCHLORIDE AND EPINEPHRINE 15; 5 MG/ML; UG/ML
INJECTION, SOLUTION EPIDURAL
Status: COMPLETED | OUTPATIENT
Start: 2020-05-28 | End: 2020-05-28

## 2020-05-28 RX ORDER — ALBUMIN, HUMAN INJ 5% 5 %
SOLUTION INTRAVENOUS PRN
Status: DISCONTINUED | OUTPATIENT
Start: 2020-05-28 | End: 2020-05-28 | Stop reason: SURG

## 2020-05-28 RX ORDER — MIDAZOLAM HYDROCHLORIDE 1 MG/ML
INJECTION INTRAMUSCULAR; INTRAVENOUS PRN
Status: DISCONTINUED | OUTPATIENT
Start: 2020-05-28 | End: 2020-05-28 | Stop reason: SURG

## 2020-05-28 RX ORDER — HALOPERIDOL 5 MG/ML
1 INJECTION INTRAMUSCULAR EVERY 6 HOURS PRN
Status: DISCONTINUED | OUTPATIENT
Start: 2020-05-28 | End: 2020-06-01 | Stop reason: HOSPADM

## 2020-05-28 RX ORDER — SODIUM CHLORIDE, SODIUM LACTATE, POTASSIUM CHLORIDE, CALCIUM CHLORIDE 600; 310; 30; 20 MG/100ML; MG/100ML; MG/100ML; MG/100ML
INJECTION, SOLUTION INTRAVENOUS CONTINUOUS
Status: DISCONTINUED | OUTPATIENT
Start: 2020-05-28 | End: 2020-05-31

## 2020-05-28 RX ORDER — HYDRALAZINE HYDROCHLORIDE 20 MG/ML
5 INJECTION INTRAMUSCULAR; INTRAVENOUS
Status: DISCONTINUED | OUTPATIENT
Start: 2020-05-28 | End: 2020-05-28 | Stop reason: HOSPADM

## 2020-05-28 RX ORDER — CELECOXIB 200 MG/1
200 CAPSULE ORAL DAILY
Status: DISCONTINUED | OUTPATIENT
Start: 2020-05-28 | End: 2020-06-01 | Stop reason: HOSPADM

## 2020-05-28 RX ORDER — ROCURONIUM BROMIDE 10 MG/ML
INJECTION, SOLUTION INTRAVENOUS PRN
Status: DISCONTINUED | OUTPATIENT
Start: 2020-05-28 | End: 2020-05-28 | Stop reason: SURG

## 2020-05-28 RX ORDER — DEXAMETHASONE SODIUM PHOSPHATE 4 MG/ML
4 INJECTION, SOLUTION INTRA-ARTICULAR; INTRALESIONAL; INTRAMUSCULAR; INTRAVENOUS; SOFT TISSUE
Status: DISCONTINUED | OUTPATIENT
Start: 2020-05-28 | End: 2020-06-01 | Stop reason: HOSPADM

## 2020-05-28 RX ORDER — PROMETHAZINE HYDROCHLORIDE 25 MG/1
25 TABLET ORAL EVERY 6 HOURS PRN
Status: DISCONTINUED | OUTPATIENT
Start: 2020-05-28 | End: 2020-06-01 | Stop reason: HOSPADM

## 2020-05-28 RX ADMIN — FENTANYL CITRATE 50 MCG: 50 INJECTION INTRAMUSCULAR; INTRAVENOUS at 07:14

## 2020-05-28 RX ADMIN — MIDAZOLAM HYDROCHLORIDE 2 MG: 1 INJECTION, SOLUTION INTRAMUSCULAR; INTRAVENOUS at 07:13

## 2020-05-28 RX ADMIN — SODIUM CHLORIDE, POTASSIUM CHLORIDE, SODIUM LACTATE AND CALCIUM CHLORIDE: 600; 310; 30; 20 INJECTION, SOLUTION INTRAVENOUS at 06:00

## 2020-05-28 RX ADMIN — FENTANYL CITRATE 50 MCG: 50 INJECTION INTRAMUSCULAR; INTRAVENOUS at 09:31

## 2020-05-28 RX ADMIN — HYDRALAZINE HYDROCHLORIDE 5 MG: 20 INJECTION INTRAMUSCULAR; INTRAVENOUS at 12:46

## 2020-05-28 RX ADMIN — POVIDONE-IODINE 15 ML: 10 SOLUTION TOPICAL at 05:59

## 2020-05-28 RX ADMIN — SUGAMMADEX 150 MG: 100 INJECTION, SOLUTION INTRAVENOUS at 10:15

## 2020-05-28 RX ADMIN — CELECOXIB 200 MG: 200 CAPSULE ORAL at 14:32

## 2020-05-28 RX ADMIN — ROCURONIUM BROMIDE 20 MG: 10 INJECTION, SOLUTION INTRAVENOUS at 08:48

## 2020-05-28 RX ADMIN — ONDANSETRON 4 MG: 2 INJECTION INTRAMUSCULAR; INTRAVENOUS at 19:57

## 2020-05-28 RX ADMIN — LIDOCAINE HYDROCHLORIDE 0.5 ML: 10 INJECTION, SOLUTION EPIDURAL; INFILTRATION; INTRACAUDAL at 05:59

## 2020-05-28 RX ADMIN — HYDRALAZINE HYDROCHLORIDE 5 MG: 20 INJECTION INTRAMUSCULAR; INTRAVENOUS at 12:20

## 2020-05-28 RX ADMIN — SODIUM CHLORIDE, POTASSIUM CHLORIDE, SODIUM LACTATE AND CALCIUM CHLORIDE: 600; 310; 30; 20 INJECTION, SOLUTION INTRAVENOUS at 18:09

## 2020-05-28 RX ADMIN — SODIUM CHLORIDE, POTASSIUM CHLORIDE, SODIUM LACTATE AND CALCIUM CHLORIDE: 600; 310; 30; 20 INJECTION, SOLUTION INTRAVENOUS at 14:33

## 2020-05-28 RX ADMIN — EPHEDRINE SULFATE 5 MG: 50 INJECTION, SOLUTION INTRAVENOUS at 08:32

## 2020-05-28 RX ADMIN — EPHEDRINE SULFATE 5 MG: 50 INJECTION, SOLUTION INTRAVENOUS at 07:34

## 2020-05-28 RX ADMIN — DEXAMETHASONE SODIUM PHOSPHATE 4 MG: 4 INJECTION, SOLUTION INTRA-ARTICULAR; INTRALESIONAL; INTRAMUSCULAR; INTRAVENOUS; SOFT TISSUE at 07:55

## 2020-05-28 RX ADMIN — LIDOCAINE HYDROCHLORIDE 20 MG: 20 INJECTION, SOLUTION EPIDURAL; INFILTRATION; INTRACAUDAL at 07:30

## 2020-05-28 RX ADMIN — ONDANSETRON 4 MG: 2 INJECTION INTRAMUSCULAR; INTRAVENOUS at 09:53

## 2020-05-28 RX ADMIN — PROPOFOL 150 MG: 10 INJECTION, EMULSION INTRAVENOUS at 07:30

## 2020-05-28 RX ADMIN — ONDANSETRON 4 MG: 2 INJECTION INTRAMUSCULAR; INTRAVENOUS at 14:52

## 2020-05-28 RX ADMIN — ROCURONIUM BROMIDE 50 MG: 10 INJECTION, SOLUTION INTRAVENOUS at 07:31

## 2020-05-28 RX ADMIN — ALBUMIN (HUMAN) 250 ML: 2.5 SOLUTION INTRAVENOUS at 09:32

## 2020-05-28 RX ADMIN — HYDROMORPHONE HYDROCHLORIDE: 1 INJECTION, SOLUTION INTRAMUSCULAR; INTRAVENOUS; SUBCUTANEOUS at 10:42

## 2020-05-28 RX ADMIN — LIDOCAINE HYDROCHLORIDE,EPINEPHRINE BITARTRATE 3 ML: 15; .005 INJECTION, SOLUTION EPIDURAL; INFILTRATION; INTRACAUDAL; PERINEURAL at 07:13

## 2020-05-28 RX ADMIN — EPHEDRINE SULFATE 5 MG: 50 INJECTION, SOLUTION INTRAVENOUS at 07:36

## 2020-05-28 RX ADMIN — CEFAZOLIN 2 G: 330 INJECTION, POWDER, FOR SOLUTION INTRAMUSCULAR; INTRAVENOUS at 07:36

## 2020-05-28 RX ADMIN — CEFAZOLIN SODIUM 2 G: 2 INJECTION, SOLUTION INTRAVENOUS at 14:32

## 2020-05-28 ASSESSMENT — PAIN SCALES - GENERAL: PAIN_LEVEL: 0

## 2020-05-28 ASSESSMENT — PATIENT HEALTH QUESTIONNAIRE - PHQ9
1. LITTLE INTEREST OR PLEASURE IN DOING THINGS: NOT AT ALL
SUM OF ALL RESPONSES TO PHQ9 QUESTIONS 1 AND 2: 0
2. FEELING DOWN, DEPRESSED, IRRITABLE, OR HOPELESS: NOT AT ALL

## 2020-05-28 ASSESSMENT — COGNITIVE AND FUNCTIONAL STATUS - GENERAL
MOBILITY SCORE: 19
CLIMB 3 TO 5 STEPS WITH RAILING: A LITTLE
SUGGESTED CMS G CODE MODIFIER MOBILITY: CK
SUGGESTED CMS G CODE MODIFIER DAILY ACTIVITY: CH
MOVING FROM LYING ON BACK TO SITTING ON SIDE OF FLAT BED: A LITTLE
WALKING IN HOSPITAL ROOM: A LITTLE
MOVING TO AND FROM BED TO CHAIR: A LITTLE
TURNING FROM BACK TO SIDE WHILE IN FLAT BAD: A LITTLE
DAILY ACTIVITIY SCORE: 24

## 2020-05-28 ASSESSMENT — LIFESTYLE VARIABLES
ON A TYPICAL DAY WHEN YOU DRINK ALCOHOL HOW MANY DRINKS DO YOU HAVE: 0
HAVE YOU EVER FELT YOU SHOULD CUT DOWN ON YOUR DRINKING: NO
ALCOHOL_USE: NO
EVER_SMOKED: NEVER
EVER FELT BAD OR GUILTY ABOUT YOUR DRINKING: NO
AVERAGE NUMBER OF DAYS PER WEEK YOU HAVE A DRINK CONTAINING ALCOHOL: 0
TOTAL SCORE: 0
HOW MANY TIMES IN THE PAST YEAR HAVE YOU HAD 5 OR MORE DRINKS IN A DAY: 0
HAVE PEOPLE ANNOYED YOU BY CRITICIZING YOUR DRINKING: NO
TOTAL SCORE: 0
TOTAL SCORE: 0
EVER HAD A DRINK FIRST THING IN THE MORNING TO STEADY YOUR NERVES TO GET RID OF A HANGOVER: NO
CONSUMPTION TOTAL: NEGATIVE

## 2020-05-28 ASSESSMENT — FIBROSIS 4 INDEX: FIB4 SCORE: 1.38

## 2020-05-28 NOTE — ANESTHESIA POSTPROCEDURE EVALUATION
Patient: Karlene Walters    Procedure Summary     Date:  05/28/20 Room / Location:  [place] [address] / SURGERY [facility]    Anesthesia Start:  0713 Anesthesia Stop:  1036    Procedure:  ABLATION, LESION, LIVER-TRISEGMENTECTOMY, MICROWAVE ABLATION, INTRA OPERATIVE ULTRA SOUND, SIMPLE RESECTED / REPAIR OF DIAPHRAGM (Abdomen) Diagnosis:  (COLON CANCER WITH LIVER METS)    Surgeon:  Kit West M.D. Responsible Provider:  Neema Barr M.D.    Anesthesia Type:  general ASA Status:  3          Final Anesthesia Type: general  Last vitals  BP   Blood Pressure : 147/92    Temp   36.6 °C (97.9 °F)    Pulse   Pulse: 67   Resp   18    SpO2   100 %      Anesthesia Post Evaluation    Patient location during evaluation: PACU  Patient participation: complete - patient participated  Level of consciousness: awake and alert  Pain score: 0    Airway patency: patent  Anesthetic complications: no  Cardiovascular status: hemodynamically stable  Respiratory status: acceptable  Hydration status: euvolemic    PONV: none           Nurse Pain Score: 0 (NPRS)

## 2020-05-28 NOTE — PROGRESS NOTES
2 RN skin check complete.   Devices in place SCD's bilaterally, SpO2 finger probe, bilateral PIV, silicone oxygen mask.  Skin assessed under devices. Skin in tact and blanching      Midline incision with island dressing CDI.  Ostomy in RLQ CDI. Beefy red budded out.  Lynne in. Stat lock secured to leg. Skin in tact and pink  Epidural in place. No drainage, Secured with tape.

## 2020-05-28 NOTE — ANESTHESIA PREPROCEDURE EVALUATION
Relevant Problems   PULMONARY   (+) Pulmonary emphysema (HCC)   (+) Shortness of breath      NEURO   (+) History of pulmonary embolus (PE)      CARDIAC   (+) Hypertension         (+) Liver disease   (+) Renal insufficiency   (+) Secondary malignant neoplasm of liver (HCC)       Physical Exam    Airway   Mallampati: II  TM distance: <3 FB  Neck ROM: full       Cardiovascular - normal exam  Rhythm: regular  Rate: normal  (-) murmur     Dental - normal exam  (+) upper dentures, lower dentures           Pulmonary - normal exam  Breath sounds clear to auscultation     Abdominal    Neurological - normal exam                 Anesthesia Plan    ASA 3       Plan - general       Airway plan will be ETT      Plan Factors:   Patient was not previously instructed to abstain from smoking on day of procedure.  Patient did not smoke on day of procedure.      Induction: intravenous      Pertinent diagnostic labs and testing reviewed    Informed Consent:    Anesthetic plan and risks discussed with patient.    Use of blood products discussed with: patient whom consented to blood products.

## 2020-05-28 NOTE — ANESTHESIA PROCEDURE NOTES
Airway    Date/Time: 5/28/2020 7:32 AM  Performed by: Neema Barr M.D.  Authorized by: Neema Barr M.D.     Location:  OR  Urgency:  Elective  Indications for Airway Management:  Anesthesia      Spontaneous Ventilation: absent    Sedation Level:  Deep  Preoxygenated: Yes    Patient Position:  Sniffing  Mask Difficulty Assessment:  1 - vent by mask  Final Airway Type:  Endotracheal airway  Final Endotracheal Airway:  ETT  Cuffed: Yes    Technique Used for Successful ETT Placement:  Direct laryngoscopy    Insertion Site:  Oral  Blade Type:  Mary  Laryngoscope Blade/Videolaryngoscope Blade Size:  3  ETT Size (mm):  7.0  Measured from:  Teeth  ETT to Teeth (cm):  20  Placement Verified by: auscultation and capnometry    Cormack-Lehane Classification:  Grade I - full view of glottis  Number of Attempts at Approach:  1  Ventilation Between Attempts:  BVM  Number of Other Approaches Attempted:  0

## 2020-05-28 NOTE — OR NURSING
Pre op admission completed.  Pt educated on surgical plan of care, all questions answered.  Bed in low position and call light within reach.  Hourly rounding in place.  This RN performed excellent hand hygiene and wore a surgical mask at all times.  Pt wore a mask at all times except when doing oral and nasal triple aim care.

## 2020-05-28 NOTE — ANESTHESIA PROCEDURE NOTES
Epidural Block    Date/Time: 5/28/2020 7:13 AM  Performed by: Neema Barr M.D.  Authorized by: Neema Barr M.D.     Start Time:  5/28/2020 7:13 AM  End Time:  5/28/2020 7:32 AM  Reason for Block: at surgeon's request and post-op pain management    patient identified, IV checked, site marked, risks and benefits discussed, surgical consent, monitors and equipment checked, pre-op evaluation and timeout performed    Patient Position:  Sitting  Prep: Betadine, ChloraPrep, patient draped and sterile technique    Monitoring:  Continuous pulse oximetry and heart rate  Approach:  Midline  Location:  T8-T9  Injection Technique:  MELODY saline  Skin infiltration:  Lidocaine  Strength:  1%  Dose:  2ml  Needle Type:  Tuohy  Needle Gauge:  17 G  Needle Length:  3.5 in  Loss of resistance::  6  Catheter Size:  19 G  Catheter at Skin Depth:  12  Test Dose Result:  Negative

## 2020-05-28 NOTE — PROGRESS NOTES
Report received from PACU. Pt arrived to floor  Assessment complete.  A&O x 4. Patient calls appropriately.  On 10 L mask at 100% per ERAS protocol until 1800  Patient ambulates with SB assist.   Patient has 4/10 pain. Pain managed with prescribed medications.  Epidural in place. Rate verified  Denies N&V. Tolerating full liquid diet.  Surgical midline incsion dressing CDI.  + void morgan in place for epidural,  Ostomy on RLQ CDI.  Patient denies SOB.  SCD's on. Treaded socks on  Review plan with of care with patient. Call light and personal belongings with in reach. Hourly rounding in place. All needs met at this time.

## 2020-05-28 NOTE — OR NURSING
report to Javier BOWIE    Ablation of lesions in liver. No resection. Gauze and tape to upper abdomen. C/d/i. No drains.  Epidural hydromorphone and ropivicaine at 4 cc/hr.   Reports tightness/pain w/ deep breaths. Rates 3/10 . Tolerable.  VSS, hyperiensive 170-180's . rec'd hydrazaline 5 mg iv x 2. BP in 140's systolic. AXOX4. CONROY.    ERAS  10 l mask 100%  Drank ginger ale. Tolerated  Pt reports legs feeling weak. Fatigued. Epidural in place.  Pt was not dangled/ stood at bedside.    Ileostomy. Scant output. Beefy red. Not leaking.  Lynne 175 dk yellow. 2700 cc ivf in. Albumin in OR  18 g left forearm. 18 g right forearm.

## 2020-05-28 NOTE — ANESTHESIA TIME REPORT
Anesthesia Start and Stop Event Times     Date Time Event    5/28/2020 07:13 AM Anesthesia Start    5/28/2020 10:36 AM Anesthesia Stop        Responsible Staff  05/28/20    Name Role Begin End    Neema Barr M.D. Anesthesiologist 05/28/20 07:13 AM 05/28/20 10:36 AM        Preop Diagnosis (Free Text):  Pre-op Diagnosis     COLON CANCER WITH LIVER METS        Preop Diagnosis (Codes):    Post op Diagnosis  Colon cancer metastasized to liver (HCC)      Premium Reason  Non-Premium    Comments:

## 2020-05-29 ENCOUNTER — ANESTHESIA EVENT (OUTPATIENT)
Dept: ANESTHESIOLOGY | Facility: MEDICAL CENTER | Age: 80
End: 2020-05-29

## 2020-05-29 ENCOUNTER — ANESTHESIA (OUTPATIENT)
Dept: ANESTHESIOLOGY | Facility: MEDICAL CENTER | Age: 80
End: 2020-05-29

## 2020-05-29 LAB
ALBUMIN SERPL BCP-MCNC: 3.6 G/DL (ref 3.2–4.9)
ALBUMIN/GLOB SERPL: 1.4 G/DL
ALP SERPL-CCNC: 146 U/L (ref 30–99)
ALT SERPL-CCNC: 388 U/L (ref 2–50)
ANION GAP SERPL CALC-SCNC: 12 MMOL/L (ref 7–16)
AST SERPL-CCNC: 764 U/L (ref 12–45)
BILIRUB SERPL-MCNC: 0.9 MG/DL (ref 0.1–1.5)
BUN SERPL-MCNC: 13 MG/DL (ref 8–22)
CALCIUM SERPL-MCNC: 9.5 MG/DL (ref 8.5–10.5)
CHLORIDE SERPL-SCNC: 103 MMOL/L (ref 96–112)
CO2 SERPL-SCNC: 23 MMOL/L (ref 20–33)
CREAT SERPL-MCNC: 0.78 MG/DL (ref 0.5–1.4)
ERYTHROCYTE [DISTWIDTH] IN BLOOD BY AUTOMATED COUNT: 61.3 FL (ref 35.9–50)
GLOBULIN SER CALC-MCNC: 2.6 G/DL (ref 1.9–3.5)
GLUCOSE SERPL-MCNC: 159 MG/DL (ref 65–99)
HCT VFR BLD AUTO: 38.7 % (ref 37–47)
HGB BLD-MCNC: 11.6 G/DL (ref 12–16)
INR PPP: 1.07 (ref 0.87–1.13)
MCH RBC QN AUTO: 26.2 PG (ref 27–33)
MCHC RBC AUTO-ENTMCNC: 30 G/DL (ref 33.6–35)
MCV RBC AUTO: 87.6 FL (ref 81.4–97.8)
PLATELET # BLD AUTO: 176 K/UL (ref 164–446)
PMV BLD AUTO: 10 FL (ref 9–12.9)
POTASSIUM SERPL-SCNC: 4 MMOL/L (ref 3.6–5.5)
PROT SERPL-MCNC: 6.2 G/DL (ref 6–8.2)
PROTHROMBIN TIME: 14.1 SEC (ref 12–14.6)
RBC # BLD AUTO: 4.42 M/UL (ref 4.2–5.4)
SODIUM SERPL-SCNC: 138 MMOL/L (ref 135–145)
WBC # BLD AUTO: 7.6 K/UL (ref 4.8–10.8)

## 2020-05-29 PROCEDURE — 97162 PT EVAL MOD COMPLEX 30 MIN: CPT

## 2020-05-29 PROCEDURE — 36415 COLL VENOUS BLD VENIPUNCTURE: CPT

## 2020-05-29 PROCEDURE — 85027 COMPLETE CBC AUTOMATED: CPT

## 2020-05-29 PROCEDURE — A9270 NON-COVERED ITEM OR SERVICE: HCPCS | Performed by: SURGERY

## 2020-05-29 PROCEDURE — 80053 COMPREHEN METABOLIC PANEL: CPT

## 2020-05-29 PROCEDURE — 85610 PROTHROMBIN TIME: CPT

## 2020-05-29 PROCEDURE — 770001 HCHG ROOM/CARE - MED/SURG/GYN PRIV*

## 2020-05-29 PROCEDURE — 700102 HCHG RX REV CODE 250 W/ 637 OVERRIDE(OP): Performed by: SURGERY

## 2020-05-29 PROCEDURE — 700111 HCHG RX REV CODE 636 W/ 250 OVERRIDE (IP): Performed by: SURGERY

## 2020-05-29 PROCEDURE — 700105 HCHG RX REV CODE 258: Performed by: SURGERY

## 2020-05-29 PROCEDURE — 99024 POSTOP FOLLOW-UP VISIT: CPT | Performed by: SURGERY

## 2020-05-29 RX ORDER — OXYCODONE HYDROCHLORIDE 5 MG/1
5 TABLET ORAL EVERY 6 HOURS
Status: DISCONTINUED | OUTPATIENT
Start: 2020-05-29 | End: 2020-06-01 | Stop reason: HOSPADM

## 2020-05-29 RX ADMIN — SODIUM CHLORIDE, POTASSIUM CHLORIDE, SODIUM LACTATE AND CALCIUM CHLORIDE: 600; 310; 30; 20 INJECTION, SOLUTION INTRAVENOUS at 19:58

## 2020-05-29 RX ADMIN — SODIUM CHLORIDE, POTASSIUM CHLORIDE, SODIUM LACTATE AND CALCIUM CHLORIDE: 600; 310; 30; 20 INJECTION, SOLUTION INTRAVENOUS at 10:11

## 2020-05-29 RX ADMIN — OXYCODONE HYDROCHLORIDE 5 MG: 5 TABLET ORAL at 11:26

## 2020-05-29 RX ADMIN — ONDANSETRON 4 MG: 2 INJECTION INTRAMUSCULAR; INTRAVENOUS at 00:56

## 2020-05-29 RX ADMIN — ENOXAPARIN SODIUM 40 MG: 100 INJECTION SUBCUTANEOUS at 11:26

## 2020-05-29 RX ADMIN — OXYCODONE HYDROCHLORIDE 2.5 MG: 5 TABLET ORAL at 16:44

## 2020-05-29 RX ADMIN — ONDANSETRON 4 MG: 2 INJECTION INTRAMUSCULAR; INTRAVENOUS at 06:00

## 2020-05-29 RX ADMIN — CEFAZOLIN SODIUM 2 G: 2 INJECTION, SOLUTION INTRAVENOUS at 00:47

## 2020-05-29 ASSESSMENT — COGNITIVE AND FUNCTIONAL STATUS - GENERAL
MOBILITY SCORE: 13
MOVING TO AND FROM BED TO CHAIR: A LOT
SUGGESTED CMS G CODE MODIFIER MOBILITY: CL
CLIMB 3 TO 5 STEPS WITH RAILING: A LOT
TURNING FROM BACK TO SIDE WHILE IN FLAT BAD: A LOT
STANDING UP FROM CHAIR USING ARMS: A LITTLE
WALKING IN HOSPITAL ROOM: A LOT
MOVING FROM LYING ON BACK TO SITTING ON SIDE OF FLAT BED: A LOT

## 2020-05-29 ASSESSMENT — ENCOUNTER SYMPTOMS: NAUSEA: 1

## 2020-05-29 ASSESSMENT — GAIT ASSESSMENTS: GAIT LEVEL OF ASSIST: UNABLE TO PARTICIPATE

## 2020-05-29 NOTE — PROGRESS NOTES
Received report from day shift RNAsad. Assumed care of pt. Pt reports c/o nausea at this time. PRN medication given (See MAR). Charge nurse assessed and rate verified on epidural. See Flowsheets for patients pain rating. Updated pt on plan of care. Pt resting comfortably in bed.  at bedside. O2 sats WDL on RA. Educated on use of call light. Hourly rounding and continuous monitoring in place.

## 2020-05-29 NOTE — PROGRESS NOTES
Patient slept through the night without complaints of pain or SOB. Patient did have c/o nausea; PRN meds given (see MAR). Epidural titrated down per MAR d/t patient feeling tingling and heaviness in R foot and blurry vision. After titration pt reports that all symptoms have resolved. Lasts neuro check and pulse check WDL. Will continue to monitor.

## 2020-05-29 NOTE — PROGRESS NOTES
Bedside report received.  Assessment complete.  A&O x 4. RA. Patient calls appropriately.  Patient ambulates with x1 assist. Bed alarm NA.   Patient has 2/10 pain. Pain managed with prescribed medications. Epidural running. Decreased rate to 1 this morning.  Denies N&V. Tolerating regular diet.  Surgical midline incision with island dressing and island dressing horizontally noted. CDI  + void with morgan in place, ostomy on RLQ.   Patient denies SOB.  SCD's on. Treaded socks on  Review plan with of care with patient. Call light and personal belongings with in reach. Hourly rounding in place. All needs met at this time.

## 2020-05-29 NOTE — PROGRESS NOTES
Surgical Progress Note    Author: Kit West M.D. Date & Time created: 2020   8:56 AM     Interval Events:  POD 1  LFTs eleveted but expected  Problem with nausea anddry heaves- may be epidural related-needed to be decreased and will decrease to 1  Comfortable  Leg strength returning since turned down    Review of Systems   Constitutional: Positive for malaise/fatigue.   Gastrointestinal: Positive for nausea.   All other systems reviewed and are negative.    Hemodynamics:  Temp (24hrs), Av.4 °C (97.6 °F), Min:36.1 °C (96.9 °F), Max:36.9 °C (98.5 °F)  Temperature: 36.9 °C (98.5 °F)  Pulse  Av  Min: 61  Max: 87   Blood Pressure : 139/79     Respiratory:    Respiration: 15, Pulse Oximetry: 97 %        RUL Breath Sounds: Clear, RML Breath Sounds: Clear, RLL Breath Sounds: Diminished, EARNEST Breath Sounds: Clear, LLL Breath Sounds: Diminished  Neuro:  GCS       Fluids:    Intake/Output Summary (Last 24 hours) at 2020 0856  Last data filed at 2020 0553  Gross per 24 hour   Intake 3372.65 ml   Output 685 ml   Net 2687.65 ml        Current Diet Order   Procedures   • Diet Order Regular     Physical Exam  Vitals signs and nursing note reviewed.   Cardiovascular:      Rate and Rhythm: Normal rate and regular rhythm.   Pulmonary:      Effort: Pulmonary effort is normal.      Breath sounds: Normal breath sounds.   Abdominal:      General: Abdomen is flat.      Palpations: Abdomen is soft.      Tenderness: There is abdominal tenderness.      Comments: Minimal pain       Labs:  Recent Results (from the past 24 hour(s))   Histology Request    Collection Time: 20 11:10 AM   Result Value Ref Range    Pathology Request Sent to Histo    CBC without Differential    Collection Time: 20  4:01 AM   Result Value Ref Range    WBC 7.6 4.8 - 10.8 K/uL    RBC 4.42 4.20 - 5.40 M/uL    Hemoglobin 11.6 (L) 12.0 - 16.0 g/dL    Hematocrit 38.7 37.0 - 47.0 %    MCV 87.6 81.4 - 97.8 fL    MCH 26.2 (L)  27.0 - 33.0 pg    MCHC 30.0 (L) 33.6 - 35.0 g/dL    RDW 61.3 (H) 35.9 - 50.0 fL    Platelet Count 176 164 - 446 K/uL    MPV 10.0 9.0 - 12.9 fL   Comp Metabolic Panel (CMP)    Collection Time: 05/29/20  4:01 AM   Result Value Ref Range    Sodium 138 135 - 145 mmol/L    Potassium 4.0 3.6 - 5.5 mmol/L    Chloride 103 96 - 112 mmol/L    Co2 23 20 - 33 mmol/L    Anion Gap 12.0 7.0 - 16.0    Glucose 159 (H) 65 - 99 mg/dL    Bun 13 8 - 22 mg/dL    Creatinine 0.78 0.50 - 1.40 mg/dL    Calcium 9.5 8.5 - 10.5 mg/dL    AST(SGOT) 764 (H) 12 - 45 U/L    ALT(SGPT) 388 (H) 2 - 50 U/L    Alkaline Phosphatase 146 (H) 30 - 99 U/L    Total Bilirubin 0.9 0.1 - 1.5 mg/dL    Albumin 3.6 3.2 - 4.9 g/dL    Total Protein 6.2 6.0 - 8.2 g/dL    Globulin 2.6 1.9 - 3.5 g/dL    A-G Ratio 1.4 g/dL   Prothrombin time (INR)    Collection Time: 05/29/20  4:01 AM   Result Value Ref Range    PT 14.1 12.0 - 14.6 sec    INR 1.07 0.87 - 1.13   ESTIMATED GFR    Collection Time: 05/29/20  4:01 AM   Result Value Ref Range    GFR If African American >60 >60 mL/min/1.73 m 2    GFR If Non African American >60 >60 mL/min/1.73 m 2     Medical Decision Making, by Problem:  There are no active hospital problems to display for this patient.    Plan:  Decrease epidural  Add po pain meds  General diet  Ambulate  Leave epidural in ntil Sunday morning    Quality Measures:  Quality-Core Measures    Discussed patient condition with Family and RN

## 2020-05-29 NOTE — CARE PLAN
Problem: Pain Management  Goal: Pain level will decrease to patient's comfort goal  Outcome: PROGRESSING AS EXPECTED     Problem: Venous Thromboembolism (VTW)/Deep Vein Thrombosis (DVT) Prevention:  Goal: Patient will participate in Venous Thrombosis (VTE)/Deep Vein Thrombosis (DVT)Prevention Measures  Outcome: PROGRESSING AS EXPECTED     Problem: Fluid Volume:  Goal: Will maintain balanced intake and output  Outcome: PROGRESSING AS EXPECTED

## 2020-05-29 NOTE — PROGRESS NOTES
Patient c/o tingling in RLE and persistent nausea with a pain rating of 0. Dilaudid titrated down. PRN medication given (see MAR) Patient sleeping comfortably with  in place at this time. Will continue to monitor.

## 2020-05-29 NOTE — OP REPORT
DATE OF SERVICE:  05/28/2020    Patient is an 80-year-old female patient known to me who is a patient of Dr. Steele and myself who has been treated as a chronic disease.  She had undergone   several ablations as well as an yttrium-90 treatment to the right lobe, and   on her most recent imaging, we identified 2 new lesions, 1 lateral to the   previous ablation cavity and another new lesion essentially at the beginning   of the right portal pedicle.  The patient's right lobe has atrophy   tremendously and the left lobe has hypertrophy tremendously over the multiple   years since her yttrium treatment.  In any event, after a long discussion with   the patient, we elected to go ahead and proceed with possible resection of   that right lobe.  The tumors in the dome, the recurrence was in the right   lobe, but the actual attached ablation cavity also was in segment 4A of the   left lobe.  After a long discussion with the patient, we elected to go ahead   and proceed with surgery.    SURGEON:  Kit West MD    ASSISTANT SURGEON:  Lucho Carney MD    ANESTHESIOLOGIST:  Neema Barr MD    ANESTHESIA:  General and epidural for postoperative pain management.    PREOPERATIVE DIAGNOSIS:  Metastatic rectal cancer to the liver.    POSTOPERATIVE DIAGNOSIS:  Metastatic rectal cancer to the liver.    PROCEDURES DONE:  1. Exploratory laparotomy.  2. Open microwave thermal ablation x10 involving 2 separate lesions in the   right lobe and medial segment of the left lobe.  This was done with 6   overlapping 100 watt at 2-minute ablation giving us 6 overlapping 3.2x4.0 cm   ablation cavities.  The second ablation was essentially right at the origin of   the right portal pedicle approximately 2 cm away from the left portal   pedicle, which most of her liver function is from that left lobe.  She went   over a single ablation of 100 watt at 2 minutes giving us a 3x3.4 cm ablation   of a 2 cm tumor.  The rest of her  liver was clean.  We were confident that we   were away enough from the hilum and the left portal pedicle, but we were   unable to do the right lobe resection due to involvement of the diaphragm,   which we had to resect and repair as well as the main right portal pedicle   near the hilum, we felt that we were going to risk injuring her left lobe   pedicle.  3. Resection and simple repair of right hemidiaphragm.    No specimen.  This was a clean contaminated case.    FINDINGS:  Unable to do the right hepatectomy due to involvement of the main   right portal pedicle adjacent to the hilum due to infusion of that right lobe   in the retroperitoneum and vena cava due to yttrium-90, would not be a safe   resection.    Patient was brought to the OR, placed under general anesthetic after having an   epidural placed by Dr. Barr for postoperative pain management with both   arms out, Lynne catheter, prepped with chlorhexidine prep.  We did close off   her ostomy with a 2-0 silk suture x2 and covered it with a clean Ray-Juan and   prepped it into the field, but then covered it with Ioban and a towel.  Once   completely draped, a timeout was done, which was adequate.  She was given   preoperative antibiotics.  At that point, incision was made from the xiphoid   down approximately 6 cm in order to see if the patient had a frozen abdomen   due to her multiple laparotomies and yttrium treatments.  We entered the   abdomen, resected the falciform ligament, sent off to pathology and   immediately noted the patient's left lateral segment encompassed most of her   abdomen.  The falciform ligament was pulled to the right because of the   hypertrophy of the left lobe.  We then took it down from the peritoneum and   then identified the right lobe of the liver.  The right lobe of the liver was   essentially fused at the dome level to her right hemidiaphragm as well as the   retroperitoneum near the secondary metastasis, but it had  shrunk incredibly   amounts and it was only the size of approximately my fist, which is 8-10 cm in   size.  At that point, we then decided to use intraoperative ultrasound in the   left lobe of the liver, identified the left portal pedicle as well as the   left lateral segment, medial segment of the left lobe and did note that the   previous ablation cavity, which was part of the fusion to the diaphragm, was   in the segment 4A portion and then in segment 8.  Intraoperative ultrasound   revealed no other tumors or masses outside of the 2 metastases that we   identified preoperatively.  The preoperative films were brought up as well as   to identify the location.  We then used Metzenbaum scissors to take down the   diaphragm off of the surface of the liver at the site of the yttrium   treatments and the previous ablation.  We did have to resect a small portion   of the diaphragm with Metzenbaum scissors and we then used a red rubber and   suction and an 0 Prolene in a horizontal mattress to close our defects tight   with 2 horizontal mattresses.  At that point, there was no sign of bowing of   the diaphragm and her airway pressures were fine and her saturations were   fine.  At that point, we were able to mobilize the anterior surface of the   liver and the posterior wall of the surface of the liver.  There was some   distortion of the suprahepatic vena cava due to the yttrium treatments and it   appeared as though that, that portion of the vena cava was fused.  The   ablation cavity appeared to be dead, but to the right and posteriorly and   inferiorly, we could identify a metastatic lesion or recurrence.  Other   portion, using the T-probe ultrasound, we then identified a second metastasis   that was identified on imaging measuring approximately 2 cm or less at the   origin of the right portal pedicle essentially obstructing that portion of the   right portal pedicle.  It was extremely close within a centimeter of  the   hilum and there were massive amounts of inflammatory changes and fibrosis   related to the yttrium treatments and then along the retroperitoneum as well   ___ lateral right triangular ligaments, took down the falciform, identified   the suprahepatic vena cava and came around the right hemidiaphragm after   having been resected.  We were able to mobilize the right lobe partially, but   not enough to safely do a resection.  We also feared injuring the left portal   pedicle since her whole liver was based on the left portal pedicle and the   hilum.  The fear was in order to get a clean margin at that portal pedicle we   would possibly narrow the hilum.  These were discussions I had preoperatively   with the patient and the goal was not to risk injuring her or putting her at   extremely high risk, so second discussion with the patient was that we were   going to do ablations of these lesions if possible and if safe, so at that   point, we opened the open microwave thermal ablation needle.  We started with   the area above near the previous ablation cavity and laterally along the right   lobe in segment 4A, segment 8 and segment 7 where the recurrence was and we   proceeded with approximately 6 overlapping ablations at 140 yanes at 2 minutes   giving us 6 overlapping ablation cavities of 3.2x4.0 cm involving segment 4A   medially, inferiorly segment 8 and segment 7 posteriorly.  At that point, we   could not identify the metastasis.  We then proceeded with going to our second   lesion.  We were able to resect the posterior attachments to the   retroperitoneum.  Unfortunately, they were fused to Gerota's fascia.  We had   to take Gerota's fascia down as well as the adrenal gland was fused to the   right lobe of the liver due to the yttrium treatments.  We then, under   ultrasound guidance, placed the needle through the tumor, but off its lateral   aspect and ran a 100-watt 2-minute ablation to ablate what appeared  to be   about 1.5 cm lesion on ultrasound.  The ablation cavity gave us a 3x3.7   ablation cavity.  Once completed, we could identify flow at the hilum and the   left portal pedicle, but we could not identify the metastatic lesion anymore.    We reinspected, irrigated with copious amounts of fluid.  We reinspected both   ablation areas and we could not identify any other metastasis.  At that   point, the operation was completed.  We completed resection and repair of her   diaphragm and then our ablation.  The rest of her peritoneum was clean and no   sign of metastatic disease.             ____________________________________     MD COLUMBA Rodney / NTS    DD:  05/28/2020 13:24:44  DT:  05/28/2020 17:22:27    D#:  [id number]  Job#:  [id number]    cc: MEGHANN SANZ MD, Lucho Carney MD

## 2020-05-29 NOTE — RESPIRATORY CARE
" COPD EDUCATION by COPD CLINICAL EDUCATOR  5/29/2020 at 8:00 AM by Makayla Lilly, RRT     Patient reviewed by COPD education team. Patient does not have a history or diagnosis of COPD and is a non-smoker, therefore does not qualify for the COPD program. Patient had a PFT 7/2019 that states \"no evidence of significant obstructive or restrictive pulmonary disease\".  "

## 2020-05-29 NOTE — THERAPY
Missed Therapy     Patient Name: Karlene Walters  Age:  80 y.o., Sex:  female  Medical Record #: [id number]  Today's Date: 5/29/2020    Discussed missed therapy with RN. Patient is still fatigued post-surgery. Requesting eval for tomorrow instead.     Please contact with any questions:    Elin Sanchez OTR/L     Pager: [phone]  Voicemail: [phone]

## 2020-05-29 NOTE — ANESTHESIA POST-OP FOLLOW-UP NOTE
"Anesthesia Pain Service Note    Type:  Epidural catheter    Patient: Karlene Walters    Patient seen and examined. and Patient chart reviewed.     /66   Pulse 61   Temp 36 °C (96.8 °F) (Temporal)   Resp 16   Ht 1.651 m (5' 5\")   Wt 75.4 kg (166 lb 3.6 oz)   LMP  (LMP Unknown)   SpO2 93%   BMI 27.66 kg/m²     Complaints:  nausea no pain    Pain:   1/10    LOC:   Awake arousable    Rate:  1ml/hr    Exam:  Vital signs stable, Afebrile and Site clean, dry, intact without tenderness or erythema    Impression:  Adequate analgesia    Assessment & Plan:  Acute post-procedural pain (G89.18)     No change  - continue    Patient examined, currently doing well.     Overnight having some blurry vision and right leg numbness, but after epidural titrated down her symptoms resolved.     Patient tolerating PO meds. Likely ok to remove epidural tomorrow 5/30, if pain is well controlled. If planning on removing epidural, please hold morning dose of lovenox. Please wait minimum 12 hours after last dose of lovenox prior to removal of epidural catheter.    Deo Wu M.D.  5/29/2020  11:13 AM  "

## 2020-05-29 NOTE — THERAPY
Physical Therapy   Initial Evaluation     Patient Name: Karlene Walters  Age:  80 y.o., Sex:  female  Medical Record #: [id number]  Today's Date: 5/29/2020     Precautions: Fall Risk, Other (See Comments)(ostomy, epidural)    Assessment  Patient is 80 y.o. female with a diagnosis of colon CA with liver mets. Pt is s/p liver ablation laparotomy on 5/28/20. Pt reports she was IND with functional mobility PTA, occasionally uses a 4WW to ambulate, lives with her spouse in a mobile home with 4-step entry. Pt's granddaughter lives down the street. Pt presents today very limited in functional mobility due to increased abdominal pain and nausea with activity. Pt required ModA for supine>sit using log roll with HOB elevated and was able to transfer with FWW and Dung to bedside chair.  Pt will cont to follow pt during acute stay. Anticipate home with Home Health PT services, but will monitor pt's progress. Pt will need CG assist at discharge.    Plan    Recommend Physical Therapy 4 times per week until therapy goals are met for the following treatments:  Bed Mobility, Equipment, Gait Training, Neuro Re-Education / Balance, Self Care/Home Evaluation, Stair Training, Therapeutic Activities and Therapeutic Exercises    Discharge recommendations:  Recommend home health transitional care for continued physical therapy services, if pt's family can provide adequate assistance.      Objective       05/29/20 0830   Prior Living Situation   Prior Services Home-Independent   Housing / Facility Mobile Home   Steps Into Home 4   Rail Right Rail (Steps into Home)   Equipment Owned 4-Wheel Walker;Front-Wheel Walker   Lives with - Patient's Self Care Capacity Spouse   Comments Granddaughter lives on same street   Prior Level of Functional Mobility   Bed Mobility Independent   Transfer Status Independent   Ambulation Independent   Distance Ambulation (Feet) 300   Assistive Devices Used 4-Wheel Walker   Stairs Independent   History of Falls    History of Falls Yes   Date of Last Fall 08/12/15   Cognition    Level of Consciousness Alert   Comments cooperative   Passive ROM Lower Body   Passive ROM Lower Body WDL   Active ROM Lower Body    Active ROM Lower Body  WDL   Strength Lower Body   Gross Strength Generalized Weakness, Equal Bilaterally   Sensation Lower Body   Comments R distal foot numbness   Lower Body Muscle Tone   Lower Body Muscle Tone  WDL   Coordination Lower Body    Coordination Lower Body  WDL   Balance Assessment   Sitting Balance (Static) Fair   Sitting Balance (Dynamic) Fair -   Standing Balance (Static) Fair   Standing Balance (Dynamic) Fair -   Weight Shift Sitting Poor   Weight Shift Standing Fair   Comments limited by abdominal pain   Gait Analysis   Gait Level Of Assist Unable to Participate   Bed Mobility    Supine to Sit Moderate Assist   Scooting Maximal Assist   Rolling Moderate Assist to Rt.   Skilled Intervention Verbal Cuing;Tactile Cuing;Sequencing;Compensatory Strategies  (log roll technique)   Functional Mobility   Sit to Stand Minimal Assist   Bed, Chair, Wheelchair Transfer Minimal Assist   Transfer Method Stand Step   Mobility bed>chair   Skilled Intervention Verbal Cuing;Tactile Cuing;Sequencing;Compensatory Strategies   Patient / Family Goals    Patient / Family Goal #1 home   Short Term Goals    Short Term Goal # 1 Pt will be SPV for supine<>sit in 6 txs to improve functional mobility.   Short Term Goal # 2 Pt will be SPV for all transfers with FWW in 6 txs to improve functional mobility.   Short Term Goal # 3 Pt will be SPV for gait x 150' with FWW in 6 txs to improve functional mobility.   Short Term Goal # 4 Pt will be SPV for up/down 4 steps with rail to be able to access her home.   Education Group   Education Provided Role of Physical Therapist   Role of Physical Therapist Patient Response Patient;Acceptance;Explanation;Action Demonstration;Verbal Demonstration   Problem List    Problems Pain;Impaired Bed  Mobility;Impaired Transfers;Impaired Ambulation;Functional Strength Deficit;Impaired Balance;Decreased Activity Tolerance;Limited Knowledge of Post-Op Precautions   Anticipated Discharge Equipment   DC Equipment None

## 2020-05-30 ENCOUNTER — ANESTHESIA (OUTPATIENT)
Dept: ANESTHESIOLOGY | Facility: MEDICAL CENTER | Age: 80
End: 2020-05-30

## 2020-05-30 ENCOUNTER — ANESTHESIA EVENT (OUTPATIENT)
Dept: ANESTHESIOLOGY | Facility: MEDICAL CENTER | Age: 80
End: 2020-05-30

## 2020-05-30 LAB
ALBUMIN SERPL BCP-MCNC: 3.2 G/DL (ref 3.2–4.9)
ALBUMIN/GLOB SERPL: 1.4 G/DL
ALP SERPL-CCNC: 136 U/L (ref 30–99)
ALT SERPL-CCNC: 286 U/L (ref 2–50)
ANION GAP SERPL CALC-SCNC: 9 MMOL/L (ref 7–16)
AST SERPL-CCNC: 390 U/L (ref 12–45)
BILIRUB SERPL-MCNC: 0.8 MG/DL (ref 0.1–1.5)
BUN SERPL-MCNC: 12 MG/DL (ref 8–22)
CALCIUM SERPL-MCNC: 9 MG/DL (ref 8.5–10.5)
CHLORIDE SERPL-SCNC: 100 MMOL/L (ref 96–112)
CO2 SERPL-SCNC: 25 MMOL/L (ref 20–33)
CREAT SERPL-MCNC: 0.74 MG/DL (ref 0.5–1.4)
ERYTHROCYTE [DISTWIDTH] IN BLOOD BY AUTOMATED COUNT: 61.6 FL (ref 35.9–50)
GLOBULIN SER CALC-MCNC: 2.3 G/DL (ref 1.9–3.5)
GLUCOSE SERPL-MCNC: 101 MG/DL (ref 65–99)
HCT VFR BLD AUTO: 38.2 % (ref 37–47)
HGB BLD-MCNC: 11.5 G/DL (ref 12–16)
MCH RBC QN AUTO: 26.1 PG (ref 27–33)
MCHC RBC AUTO-ENTMCNC: 30.1 G/DL (ref 33.6–35)
MCV RBC AUTO: 86.6 FL (ref 81.4–97.8)
PLATELET # BLD AUTO: 181 K/UL (ref 164–446)
PMV BLD AUTO: 9.7 FL (ref 9–12.9)
POTASSIUM SERPL-SCNC: 4.8 MMOL/L (ref 3.6–5.5)
PROT SERPL-MCNC: 5.5 G/DL (ref 6–8.2)
RBC # BLD AUTO: 4.41 M/UL (ref 4.2–5.4)
SODIUM SERPL-SCNC: 134 MMOL/L (ref 135–145)
WBC # BLD AUTO: 5.5 K/UL (ref 4.8–10.8)

## 2020-05-30 PROCEDURE — 700102 HCHG RX REV CODE 250 W/ 637 OVERRIDE(OP): Performed by: SURGERY

## 2020-05-30 PROCEDURE — 85027 COMPLETE CBC AUTOMATED: CPT

## 2020-05-30 PROCEDURE — 700105 HCHG RX REV CODE 258: Performed by: SURGERY

## 2020-05-30 PROCEDURE — 770001 HCHG ROOM/CARE - MED/SURG/GYN PRIV*

## 2020-05-30 PROCEDURE — 36415 COLL VENOUS BLD VENIPUNCTURE: CPT

## 2020-05-30 PROCEDURE — 80053 COMPREHEN METABOLIC PANEL: CPT

## 2020-05-30 PROCEDURE — A9270 NON-COVERED ITEM OR SERVICE: HCPCS | Performed by: SURGERY

## 2020-05-30 RX ADMIN — OXYCODONE HYDROCHLORIDE 2.5 MG: 5 TABLET ORAL at 01:06

## 2020-05-30 RX ADMIN — OXYCODONE HYDROCHLORIDE 2.5 MG: 5 TABLET ORAL at 05:54

## 2020-05-30 RX ADMIN — OXYCODONE HYDROCHLORIDE 2.5 MG: 5 TABLET ORAL at 18:12

## 2020-05-30 RX ADMIN — SODIUM CHLORIDE, POTASSIUM CHLORIDE, SODIUM LACTATE AND CALCIUM CHLORIDE: 600; 310; 30; 20 INJECTION, SOLUTION INTRAVENOUS at 09:23

## 2020-05-30 RX ADMIN — CELECOXIB 200 MG: 200 CAPSULE ORAL at 05:54

## 2020-05-30 RX ADMIN — OXYCODONE HYDROCHLORIDE 2.5 MG: 5 TABLET ORAL at 13:18

## 2020-05-30 NOTE — PROGRESS NOTES
Bedside report received.  Assessment complete.  A&O x 4. RA. Patient calls appropriately.  Patient ambulates with x1 assist. Using FWW. Bed alarm NA.   Patient has 0/10 pain. Pain managed with prescribed medications. Epidural running at 1. Plan to remove this AM probably  Denies N&V. Tolerating regular diet.  Surgical midline incision with island dressing and island dressing horizontally noted. CDI  + void with morgan in place, ostomy on RLQ. Stoma is a little inflamed.  Patient denies SOB.  SCD's on. Treaded socks on  Review plan with of care with patient. Call light and personal belongings with in reach. Hourly rounding in place. All needs met at this time.

## 2020-05-30 NOTE — CARE PLAN
Problem: Pain Management  Goal: Pain level will decrease to patient's comfort goal  Outcome: PROGRESSING AS EXPECTED  Note: Medicating patient appropriately with prescribed regimen. Patient reporting decreased pain and showing no signs of distress       Problem: Safety  Goal: Will remain free from injury  Outcome: PROGRESSING AS EXPECTED  Intervention: Provide assistance with mobility  Note: Educated patient on use of call light. Identified fall risks. Appropriate fall precautions in place. Patient calling appropriately

## 2020-05-30 NOTE — PROGRESS NOTES
Progress Note    Author:  Sarah Rahman MD    Date & Time:   5/30/2020   4:02 PM          Patient ID:             Name:             Karlene Walters   YOB: 1940  Age:                 80 y.o.  female   MRN:               [id number]    ________________________________________________________________________      Interval Events:       Patient was having nausea/dizziness with the epidural so it was d/c'd along with morgan.  She says her pain is controlled with percocet 5  Ambulating and using IS.    Tolerating regular diet    -monitor for pain control    Exam:       Vitals:    05/30/20 1125   BP: 108/59   Pulse: 84   Resp: 18   Temp: 36.4 °C (97.5 °F)   SpO2: 94%     SpO2  Min: 84 %  Max: 100 %  O2 (LPM)  Min: 0  Max: 10  Temp  Min: 36 °C (96.8 °F)  Max: 37.1 °C (98.8 °F)    Intake/Output Summary (Last 24 hours) at 5/30/2020 1602  Last data filed at 5/30/2020 1500  Gross per 24 hour   Intake 1550.5 ml   Output 1770 ml   Net -219.5 ml       DIET ORDERS (From admission to next 24h)     Start     Ordered    05/29/20 0845  Diet Order Regular  ALL MEALS     Question:  Diet:  Answer:  Regular    05/29/20 0844                    Physical Exam  Nursing note reviewed.   Constitutional:       Appearance: Normal appearance. Alert, oriented x 4.NAD  HENT:      Head: Normocephalic and atraumatic.      Mouth/Throat:      Mouth: Mucous membranes are moist.   Eyes:      Pupils: Pupils are equal, round, and reactive to light.      No scleral Icterus present  Cardiovascular:      Rate and Rhythm: Normal rate and regular rhythm. Extremities warm, well perfused no edema.   Pulmonary:      Effort: Pulmonary effort is normal.      Breath sounds: No wheezes or stridor  Abdominal:      General: Abdomen is soft, non-distended. Mild TTP upper abdomen           Recent Labs     05/29/20  0401 05/30/20  0347   SODIUM 138 134*   POTASSIUM 4.0 4.8   CHLORIDE 103 100   CO2 23 25   BUN 13 12   CREATININE 0.78 0.74   CALCIUM 9.5 9.0        Recent Labs     05/29/20  0401 05/30/20  0347   ALTSGPT 388* 286*   ASTSGOT 764* 390*   ALKPHOSPHAT 146* 136*   TBILIRUBIN 0.9 0.8   GLUCOSE 159* 101*       Recent Labs     05/28/20  0550 05/29/20  0401 05/30/20  0347   RBC  --  4.42 4.41   HEMOGLOBIN  --  11.6* 11.5*   HEMATOCRIT  --  38.7 38.2   PLATELETCT  --  176 181   PROTHROMBTM 13.5 14.1  --    INR 1.01 1.07  --        Recent Labs     05/29/20  0401 05/30/20 0347   WBC 7.6 5.5   ASTSGOT 764* 390*   ALTSGPT 388* 286*   ALKPHOSPHAT 146* 136*   TBILIRUBIN 0.9 0.8         ________________________________________________________________________      Patient Active Problem List   Diagnosis   • Malignant neoplasm of rectum (HCC)   • Hypertension   • Anemia   • Abnormal EKG   • Thyroid nodule   • Secondary malignant neoplasm of liver (HCC)   • Dehydration   • Nausea & vomiting   • History of pulmonary embolus (PE)   • Ileitis   • Diarrhea   • Hyperlipidemia   • Lightheadedness   • Shortness of breath   • Routine general medical examination at a health care facility   • Renal insufficiency   • Hyperglycemia   • Decreased GFR   • Chemotherapy-induced neutropenia (HCC)   • Uncontrolled pain   • Localized edema   • Elevated alkaline phosphatase level   • Travel advice encounter   • Pulmonary emphysema (HCC)   • Current use of long term anticoagulation   • Liver disease   • Restless legs   • Incomplete rectal prolapse

## 2020-05-30 NOTE — ANESTHESIA POST-OP FOLLOW-UP NOTE
"Anesthesia Pain Service Note    Type:  Epidural catheter    Patient: Karlene Walters    Patient seen and examined. and Patient chart reviewed.     /55   Pulse 86   Temp 37.1 °C (98.8 °F) (Temporal)   Resp 17   Ht 1.651 m (5' 5\")   Wt 75.4 kg (166 lb 3.6 oz)   LMP  (LMP Unknown)   SpO2 91%   BMI 27.66 kg/m²     Complaints:  No complaints.    Pain:   0/10    LOC:   Awake arousable    Rate:  1ml/hr    Exam:  Vital signs stable, Afebrile and Site clean, dry, intact without tenderness or erythema    Impression:  Adequate analgesia    Assessment & Plan:  Acute post-procedural pain (G89.18)     D/C by MD (Tip intact, no apparent complications)    Patient doing very well. No nausea, pain well controlled.     Ok to restart lovenox tomorrow morning.     Deo Wu M.D.  5/30/2020  10:39 AM  "

## 2020-05-30 NOTE — PROGRESS NOTES
Bedside report received. Assumed care of pt. Assessment completed.  Pt is A&O x4. Pt on room air.   Pain controlled with epidural at 1 mL/hr. Q 4 neuro checks in place  Denies nausea.   - numbness, - tingling    Skin: epidural site clean dry intact  LRQ ostomy  Transverse and vertical abd island dressing over incision     LDA: Epidural  18g LFA running LR @ 75 mL/hr  18g R wrist SL    Ostomy producing dark brown contents    +void, morgan in place  Regular diet. Tolerates well.   Pt up in bed. Tolerates well.   Call light within reach. All needs met at this time. Fall Precautions and hourly rounding in place.

## 2020-05-30 NOTE — CARE PLAN
Problem: Pain Management  Goal: Pain level will decrease to patient's comfort goal  Outcome: PROGRESSING AS EXPECTED    Epidural titration in place. Bridge with 2.5 oxy PO       Problem: Bowel/Gastric:  Goal: Normal bowel function is maintained or improved  Outcome: PROGRESSING AS EXPECTED  Ostomy in place. Visible contents in bag collection

## 2020-05-31 LAB
ALBUMIN SERPL BCP-MCNC: 2.7 G/DL (ref 3.2–4.9)
ALBUMIN/GLOB SERPL: 1 G/DL
ALP SERPL-CCNC: 138 U/L (ref 30–99)
ALT SERPL-CCNC: 192 U/L (ref 2–50)
ANION GAP SERPL CALC-SCNC: 8 MMOL/L (ref 7–16)
AST SERPL-CCNC: 182 U/L (ref 12–45)
BILIRUB SERPL-MCNC: 0.8 MG/DL (ref 0.1–1.5)
BUN SERPL-MCNC: 12 MG/DL (ref 8–22)
CALCIUM SERPL-MCNC: 9.1 MG/DL (ref 8.5–10.5)
CHLORIDE SERPL-SCNC: 101 MMOL/L (ref 96–112)
CO2 SERPL-SCNC: 25 MMOL/L (ref 20–33)
CREAT SERPL-MCNC: 0.68 MG/DL (ref 0.5–1.4)
ERYTHROCYTE [DISTWIDTH] IN BLOOD BY AUTOMATED COUNT: 60.5 FL (ref 35.9–50)
GLOBULIN SER CALC-MCNC: 2.8 G/DL (ref 1.9–3.5)
GLUCOSE SERPL-MCNC: 99 MG/DL (ref 65–99)
HCT VFR BLD AUTO: 35.7 % (ref 37–47)
HGB BLD-MCNC: 11 G/DL (ref 12–16)
MCH RBC QN AUTO: 26.3 PG (ref 27–33)
MCHC RBC AUTO-ENTMCNC: 30.8 G/DL (ref 33.6–35)
MCV RBC AUTO: 85.4 FL (ref 81.4–97.8)
PLATELET # BLD AUTO: 172 K/UL (ref 164–446)
PMV BLD AUTO: 10.4 FL (ref 9–12.9)
POTASSIUM SERPL-SCNC: 3.7 MMOL/L (ref 3.6–5.5)
PROT SERPL-MCNC: 5.5 G/DL (ref 6–8.2)
RBC # BLD AUTO: 4.18 M/UL (ref 4.2–5.4)
SODIUM SERPL-SCNC: 134 MMOL/L (ref 135–145)
WBC # BLD AUTO: 7.9 K/UL (ref 4.8–10.8)

## 2020-05-31 PROCEDURE — 85027 COMPLETE CBC AUTOMATED: CPT

## 2020-05-31 PROCEDURE — 700105 HCHG RX REV CODE 258: Performed by: SURGERY

## 2020-05-31 PROCEDURE — 770001 HCHG ROOM/CARE - MED/SURG/GYN PRIV*

## 2020-05-31 PROCEDURE — 80053 COMPREHEN METABOLIC PANEL: CPT

## 2020-05-31 PROCEDURE — 36415 COLL VENOUS BLD VENIPUNCTURE: CPT

## 2020-05-31 PROCEDURE — 700102 HCHG RX REV CODE 250 W/ 637 OVERRIDE(OP): Performed by: SURGERY

## 2020-05-31 PROCEDURE — A9270 NON-COVERED ITEM OR SERVICE: HCPCS | Performed by: SURGERY

## 2020-05-31 PROCEDURE — 700111 HCHG RX REV CODE 636 W/ 250 OVERRIDE (IP): Performed by: SURGERY

## 2020-05-31 RX ADMIN — SODIUM CHLORIDE, POTASSIUM CHLORIDE, SODIUM LACTATE AND CALCIUM CHLORIDE: 600; 310; 30; 20 INJECTION, SOLUTION INTRAVENOUS at 10:05

## 2020-05-31 RX ADMIN — CELECOXIB 200 MG: 200 CAPSULE ORAL at 06:03

## 2020-05-31 RX ADMIN — ENOXAPARIN SODIUM 40 MG: 100 INJECTION SUBCUTANEOUS at 09:54

## 2020-05-31 RX ADMIN — OXYCODONE HYDROCHLORIDE 2.5 MG: 5 TABLET ORAL at 00:50

## 2020-05-31 RX ADMIN — OXYCODONE HYDROCHLORIDE 2.5 MG: 5 TABLET ORAL at 06:03

## 2020-05-31 NOTE — PROGRESS NOTES
Received report of patient at start of shift. Patient is AOx4. Heat pack given for complaints of neck pain. Assessment complete, on room air. MD removed island dressings to abdomen. Staples present to abdominal incision - clean/dry/intact. Ostomy to RLQ, stoma pink/prolapsed - discussed with Dr. Rahman. Patient ambulated in unit with standby assist and front wheel walker. Patient uses call light appropriately for assistance. Safety precautions in place.

## 2020-05-31 NOTE — CARE PLAN
Problem: Pain Management  Goal: Pain level will decrease to patient's comfort goal  Outcome: PROGRESSING AS EXPECTED    Epidural removed and pt briged to PO 2.5 mg Oxycodone     Problem: Urinary Elimination:  Goal: Ability to reestablish a normal urinary elimination pattern will improve  Outcome: PROGRESSING AS EXPECTED    Lynne removed, pt ambulating to bathroom

## 2020-05-31 NOTE — PROGRESS NOTES
Progress Note    Author:  Sarah Rahman MD    Date & Time:   5/31/2020   12:07 PM          Patient ID:             Name:             Karlene Walters   YOB: 1940  Age:                 80 y.o.  female   MRN:               [id number]    ________________________________________________________________________      Interval Events:       Patient was having nausea/dizziness with the epidural so it was d/c'd along with morgan yesterday  She has mild abdominal discomfort but denies pain, refusing any pain medication  Ambulating and using IS.     Tolerating regular diet            Exam:       Vitals:    05/31/20 0855   BP: 155/81   Pulse: 81   Resp: 17   Temp: 36.9 °C (98.4 °F)   SpO2: 93%     SpO2  Min: 84 %  Max: 100 %  O2 (LPM)  Min: 0  Max: 10  Temp  Min: 36 °C (96.8 °F)  Max: 37.1 °C (98.8 °F)    Intake/Output Summary (Last 24 hours) at 5/31/2020 1207  Last data filed at 5/31/2020 0855  Gross per 24 hour   Intake 1440 ml   Output 620 ml   Net 820 ml       DIET ORDERS (From admission to next 24h)     Start     Ordered    05/29/20 0845  Diet Order Regular  ALL MEALS     Question:  Diet:  Answer:  Regular    05/29/20 0844                    Physical Exam  Nursing note reviewed.   Constitutional:       Appearance: Normal appearance. Alert, oriented x 4.NAD  HENT:      Head: Normocephalic and atraumatic.      Mouth/Throat:      Mouth: Mucous membranes are moist.   Eyes:      Pupils: Pupils are equal, round, and reactive to light.      No scleral Icterus present  Cardiovascular:      Rate and Rhythm: Normal rate and regular rhythm. Extremities warm, well perfused no edema.   Pulmonary:      Effort: Pulmonary effort is normal.      Breath sounds: Normal breath sounds. No wheezes or stridor  Abdominal:      General: Abdomen is soft, non-tender. Incision c/d/i without erythema; ostomy; Gas and entericus in bag               Recent Labs     05/29/20  0401 05/30/20  0347 05/31/20  0649   SODIUM 138 134* 134*    POTASSIUM 4.0 4.8 3.7   CHLORIDE 103 100 101   CO2 23 25 25   BUN 13 12 12   CREATININE 0.78 0.74 0.68   CALCIUM 9.5 9.0 9.1       Recent Labs     05/29/20 0401 05/30/20 0347 05/31/20  0649   ALTSGPT 388* 286* 192*   ASTSGOT 764* 390* 182*   ALKPHOSPHAT 146* 136* 138*   TBILIRUBIN 0.9 0.8 0.8   GLUCOSE 159* 101* 99       Recent Labs     05/29/20 0401 05/30/20 0347 05/31/20  0649   RBC 4.42 4.41 4.18*   HEMOGLOBIN 11.6* 11.5* 11.0*   HEMATOCRIT 38.7 38.2 35.7*   PLATELETCT 176 181 172   PROTHROMBTM 14.1  --   --    INR 1.07  --   --        Recent Labs     05/29/20 0401 05/30/20 0347 05/31/20  0649   WBC 7.6 5.5 7.9   ASTSGOT 764* 390* 182*   ALTSGPT 388* 286* 192*   ALKPHOSPHAT 146* 136* 138*   TBILIRUBIN 0.9 0.8 0.8         ________________________________________________________________________      Patient Active Problem List   Diagnosis   • Malignant neoplasm of rectum (HCC)   • Hypertension   • Anemia   • Abnormal EKG   • Thyroid nodule   • Secondary malignant neoplasm of liver (HCC)   • Dehydration   • Nausea & vomiting   • History of pulmonary embolus (PE)   • Ileitis   • Diarrhea   • Hyperlipidemia   • Lightheadedness   • Shortness of breath   • Routine general medical examination at a health care facility   • Renal insufficiency   • Hyperglycemia   • Decreased GFR   • Chemotherapy-induced neutropenia (HCC)   • Uncontrolled pain   • Localized edema   • Elevated alkaline phosphatase level   • Travel advice encounter   • Pulmonary emphysema (HCC)   • Current use of long term anticoagulation   • Liver disease   • Restless legs   • Incomplete rectal prolapse       Plan:  Continue present management and monitor for pain control.

## 2020-05-31 NOTE — PROGRESS NOTES
Bedside report received. Assumed care of pt. Assessment completed.  Pt is A&O x4. Pt on room air.   Pain control bridged to PO oxy 2.5 mg  Denies nausea.   - numbness, - tingling     Skin:  LRQ ostomy  Transverse and vertical abd island dressing over incision                LDA: Epidural and morgan removed this morning  18g LFA running LR @ 75 mL/hr  18g R wrist SL     Ostomy producing dark brown contents     +void, up to bathroom 1x assist w/FWW  Regular diet. Tolerates well.   Pt up in bed. Tolerates well.   Call light within reach. All needs met at this time. Fall Precautions and hourly rounding in place.

## 2020-06-01 ENCOUNTER — PATIENT OUTREACH (OUTPATIENT)
Dept: HEALTH INFORMATION MANAGEMENT | Facility: OTHER | Age: 80
End: 2020-06-01

## 2020-06-01 VITALS
RESPIRATION RATE: 17 BRPM | BODY MASS INDEX: 27.7 KG/M2 | TEMPERATURE: 98.2 F | OXYGEN SATURATION: 94 % | HEART RATE: 88 BPM | DIASTOLIC BLOOD PRESSURE: 83 MMHG | WEIGHT: 166.23 LBS | HEIGHT: 65 IN | SYSTOLIC BLOOD PRESSURE: 159 MMHG

## 2020-06-01 LAB
ALBUMIN SERPL BCP-MCNC: 2.8 G/DL (ref 3.2–4.9)
ALBUMIN/GLOB SERPL: 1 G/DL
ALP SERPL-CCNC: 144 U/L (ref 30–99)
ALT SERPL-CCNC: 144 U/L (ref 2–50)
ANION GAP SERPL CALC-SCNC: 7 MMOL/L (ref 7–16)
AST SERPL-CCNC: 106 U/L (ref 12–45)
BILIRUB SERPL-MCNC: 0.6 MG/DL (ref 0.1–1.5)
BUN SERPL-MCNC: 12 MG/DL (ref 8–22)
CALCIUM SERPL-MCNC: 8.8 MG/DL (ref 8.5–10.5)
CHLORIDE SERPL-SCNC: 102 MMOL/L (ref 96–112)
CO2 SERPL-SCNC: 24 MMOL/L (ref 20–33)
CREAT SERPL-MCNC: 0.66 MG/DL (ref 0.5–1.4)
ERYTHROCYTE [DISTWIDTH] IN BLOOD BY AUTOMATED COUNT: 62.5 FL (ref 35.9–50)
GLOBULIN SER CALC-MCNC: 2.7 G/DL (ref 1.9–3.5)
GLUCOSE SERPL-MCNC: 114 MG/DL (ref 65–99)
HCT VFR BLD AUTO: 34.3 % (ref 37–47)
HGB BLD-MCNC: 10.7 G/DL (ref 12–16)
MCH RBC QN AUTO: 26.5 PG (ref 27–33)
MCHC RBC AUTO-ENTMCNC: 31.2 G/DL (ref 33.6–35)
MCV RBC AUTO: 84.9 FL (ref 81.4–97.8)
PLATELET # BLD AUTO: 179 K/UL (ref 164–446)
PMV BLD AUTO: 10.4 FL (ref 9–12.9)
POTASSIUM SERPL-SCNC: 3.8 MMOL/L (ref 3.6–5.5)
PROT SERPL-MCNC: 5.5 G/DL (ref 6–8.2)
RBC # BLD AUTO: 4.04 M/UL (ref 4.2–5.4)
SODIUM SERPL-SCNC: 133 MMOL/L (ref 135–145)
WBC # BLD AUTO: 8.4 K/UL (ref 4.8–10.8)

## 2020-06-01 PROCEDURE — 700102 HCHG RX REV CODE 250 W/ 637 OVERRIDE(OP): Performed by: SURGERY

## 2020-06-01 PROCEDURE — A9270 NON-COVERED ITEM OR SERVICE: HCPCS | Performed by: SURGERY

## 2020-06-01 PROCEDURE — 99024 POSTOP FOLLOW-UP VISIT: CPT | Performed by: SURGERY

## 2020-06-01 PROCEDURE — 97116 GAIT TRAINING THERAPY: CPT | Mod: CQ

## 2020-06-01 PROCEDURE — 97530 THERAPEUTIC ACTIVITIES: CPT | Mod: CQ

## 2020-06-01 PROCEDURE — 97165 OT EVAL LOW COMPLEX 30 MIN: CPT

## 2020-06-01 PROCEDURE — 80053 COMPREHEN METABOLIC PANEL: CPT

## 2020-06-01 PROCEDURE — 85027 COMPLETE CBC AUTOMATED: CPT

## 2020-06-01 RX ORDER — OXYCODONE HYDROCHLORIDE 5 MG/1
5 TABLET ORAL EVERY 6 HOURS
Qty: 30 TAB | Refills: 0 | Status: SHIPPED | OUTPATIENT
Start: 2020-06-01 | End: 2021-02-09 | Stop reason: SDUPTHER

## 2020-06-01 RX ADMIN — CELECOXIB 200 MG: 200 CAPSULE ORAL at 04:08

## 2020-06-01 SDOH — ECONOMIC STABILITY: FOOD INSECURITY: WITHIN THE PAST 12 MONTHS, YOU WORRIED THAT YOUR FOOD WOULD RUN OUT BEFORE YOU GOT MONEY TO BUY MORE.: NEVER TRUE

## 2020-06-01 SDOH — ECONOMIC STABILITY: TRANSPORTATION INSECURITY
IN THE PAST 12 MONTHS, HAS THE LACK OF TRANSPORTATION KEPT YOU FROM MEDICAL APPOINTMENTS OR FROM GETTING MEDICATIONS?: NO

## 2020-06-01 SDOH — ECONOMIC STABILITY: TRANSPORTATION INSECURITY
IN THE PAST 12 MONTHS, HAS LACK OF TRANSPORTATION KEPT YOU FROM MEETINGS, WORK, OR FROM GETTING THINGS NEEDED FOR DAILY LIVING?: NO

## 2020-06-01 SDOH — ECONOMIC STABILITY: FOOD INSECURITY: WITHIN THE PAST 12 MONTHS, THE FOOD YOU BOUGHT JUST DIDN'T LAST AND YOU DIDN'T HAVE MONEY TO GET MORE.: NEVER TRUE

## 2020-06-01 SDOH — ECONOMIC STABILITY: INCOME INSECURITY: HOW HARD IS IT FOR YOU TO PAY FOR THE VERY BASICS LIKE FOOD, HOUSING, MEDICAL CARE, AND HEATING?: NOT HARD AT ALL

## 2020-06-01 ASSESSMENT — COGNITIVE AND FUNCTIONAL STATUS - GENERAL
CLIMB 3 TO 5 STEPS WITH RAILING: A LITTLE
DRESSING REGULAR LOWER BODY CLOTHING: A LITTLE
TURNING FROM BACK TO SIDE WHILE IN FLAT BAD: A LITTLE
STANDING UP FROM CHAIR USING ARMS: A LITTLE
DAILY ACTIVITIY SCORE: 20
TOILETING: A LITTLE
WALKING IN HOSPITAL ROOM: A LITTLE
HELP NEEDED FOR BATHING: A LITTLE
MOBILITY SCORE: 18
MOVING FROM LYING ON BACK TO SITTING ON SIDE OF FLAT BED: A LITTLE
DRESSING REGULAR UPPER BODY CLOTHING: A LITTLE
SUGGESTED CMS G CODE MODIFIER MOBILITY: CK
SUGGESTED CMS G CODE MODIFIER DAILY ACTIVITY: CJ
MOVING TO AND FROM BED TO CHAIR: A LITTLE

## 2020-06-01 ASSESSMENT — GAIT ASSESSMENTS
ASSISTIVE DEVICE: FRONT WHEEL WALKER
GAIT LEVEL OF ASSIST: SUPERVISED
DISTANCE (FEET): 250
DEVIATION: BRADYKINETIC

## 2020-06-01 ASSESSMENT — ACTIVITIES OF DAILY LIVING (ADL): TOILETING: INDEPENDENT

## 2020-06-01 NOTE — DISCHARGE SUMMARY
Discharge Summary    CHIEF COMPLAINT ON ADMISSION  No chief complaint on file.      Reason for Admission  COLON CANCER WITH LIVER METS     Admission Date  5/28/2020    CODE STATUS  Full Code    HPI & HOSPITAL COURSE  This is a 80 y.o. female here with status post multiple ablations of 2 metastasis right lobe of the liver.  The patient was not a resectable candidate due to its location near the bifurcation.  Patient has done well.  Her labs have returned to normal, her she is very comfortable without an epidural.  She is tolerating p.o. pain medication and a general diet so the patient being discharged home today.  Her ostomy is functioning well.       Therefore, she is discharged in good and stable condition to home with close outpatient follow-up.    The patient met 2-midnight criteria for an inpatient stay at the time of discharge.    Discharge Date  June 1, 2020    FOLLOW UP ITEMS POST DISCHARGE  Patient has been instructed to follow-up with me on June 9, 2020 for staple removal and her first postoperative visit.  She will also undergo a CT scan at the end of June and I will see her either via video conference/telemedicine or in person.  This will help us evaluate our ablation cavities.    DISCHARGE DIAGNOSES  Active Problems:    * No active hospital problems. *  Resolved Problems:    * No resolved hospital problems. *      FOLLOW UP  Future Appointments   Date Time Provider Department Center   6/15/2020  1:30 PM Lucho Steele M.D. [facility] None   6/16/2020  8:00 AM [facility]   6/18/2020  1:30 PM [facility]   6/29/2020  8:30 AM [facility] QUICK INJECT ON [facility]   6/29/2020 10:30 AM [address]   6/30/2020  7:00 AM [facility]   6/30/2020  7:30 AM [facility] Krishnan None   6/30/2020  8:00 AM [facility]   7/2/2020 11:45 AM [facility]   7/14/2020  7[facility]00 [facility]   7/14/2020  7:30 AM ARTUR[facility] Krishnan [facility] None   7/14/2020  8:00 [facility]   7/16/2020 11:45 [facility]   9/23/2020 11:15 AM Ahsan Haque M.D. [facility] None     Kit West M.D.  [address]  [address]  [facility] [address]  [phone]    On 6/9/2020        MEDICATIONS ON DISCHARGE     Medication List      START taking these medications      Instructions   oxyCODONE immediate-release 5 MG Tabs  Commonly known as:  ROXICODONE   Doctor's comments:  Take 1/2 to 1 pill every 6 hours prn pain  Take 1 Tab by mouth every 6 hours for 8 days.  Dose:  5 mg        CONTINUE taking these medications      Instructions   CENTRUM SILVER PO   Take 1 Tab by mouth every day.  Dose:  1 Tab     cyanocobalamin 500 MCG Tabs  Commonly known as:  VITAMIN B-12   Take 1,000 mcg by mouth every day.  Dose:  1,000 mcg     lidocaine-prilocaine 2.5-2.5 % Crea  Commonly known as:  EMLA   APPLY A THICK FILM OVER THE PORT ACCESS SITE PRIOR TO ACCESS     loratadine 10 MG Tabs  Commonly known as:  CLARITIN   Take 10 mg by mouth every day. Indications: Hayfever  Dose:  10 mg     metronidazole 0.75 % cream  Commonly known as:  METROCREAM   Apply 1 Each to affected area(s) 2 times a day.  Dose:  1 Each     ondansetron 4 MG Tabs tablet  Commonly known as:  ZOFRAN   Take 1 Tab by mouth as needed.  Dose:  4 mg     potassium chloride ER 10 MEQ tablet  Commonly known as:  KLOR-CON   Take 1 Tab by mouth every day.  Dose:  10 mEq     rivaroxaban 10 MG Tabs tablet  Commonly known as:  Xarelto   Take 1 Tab by mouth every day.  Dose:  10 mg     Vitamin D3 10 MCG (400 UNIT) Caps   Take 1 Cap by mouth every day.  Dose:  1 Cap            Allergies  Allergies   Allergen Reactions   • Oxaliplatin Anaphylaxis   • Codeine      \"gets drunk\"   • Pcn [Penicillins] Itching     itching   • Sulfa Drugs Itching     itching   • Tape Rash     PAPER TAPE OK       DIET  Orders " Placed This Encounter   Procedures   • Diet Order Regular     Standing Status:   Standing     Number of Occurrences:   1     Order Specific Question:   Diet:     Answer:   Regular [1]       ACTIVITY  As tolerated.  Weight bearing as tolerated    CONSULTATIONS  She will follow-up with medical oncology post op    PROCEDURES  Laparotomy with multiple intraoperative ablations and diaphragm resection and repair.    LABORATORY  Lab Results   Component Value Date    SODIUM 133 (L) 06/01/2020    POTASSIUM 3.8 06/01/2020    CHLORIDE 102 06/01/2020    CO2 24 06/01/2020    GLUCOSE 114 (H) 06/01/2020    BUN 12 06/01/2020    CREATININE 0.66 06/01/2020        Lab Results   Component Value Date    WBC 8.4 06/01/2020    HEMOGLOBIN 10.7 (L) 06/01/2020    HEMATOCRIT 34.3 (L) 06/01/2020    PLATELETCT 179 06/01/2020        Total time of the discharge process exceeds 20 minutes.

## 2020-06-01 NOTE — THERAPY
"Occupational Therapy   Initial Evaluation     Patient Name: Karlene Walters  Age:  80 y.o., Sex:  female  Medical Record #: [id number]  Today's Date: 6/1/2020     Precautions  Precautions: Fall Risk, Other (See Comments)(ostomy, epidural)    Assessment  Patient is 80 y.o. female with a diagnosis of colon cancer with mets. Pt is now s/p ex lap. Pt reports she is feeling good about going home. Pt demo'd impaired balance, functional mobility and activity tolerance impacting functional independence. Will follow for acute OT services.     Plan    Recommend Occupational Therapy 3 times per week until therapy goals are met for the following treatments:  Adaptive Equipment, Neuro Re-Education / Balance, Self Care/Activities of Daily Living, Therapeutic Activities and Therapeutic Exercises.    Discharge recommendations:  Optimally, pt would have post acute placement, pt reports she will refuse. Recommend DC home w/ HH.         Subjective    \"I'' be just fine at home\"     Objective     06/01/20 0848   Prior Living Situation   Prior Services Home-Independent   Housing / Facility Mobile Home   Bathroom Set up Bathtub / Shower Combination;Grab Bars   Equipment Owned 4-Wheel Walker;Front-Wheel Walker   Lives with - Patient's Self Care Capacity Spouse   Comments granddaughter lives on same street   Bed Mobility    Sit to Supine Supervised   Scooting Supervised  (scooted forward in chair)   ADL Assessment   Grooming Supervision;Seated  (close SPV standing)   Upper Body Dressing Supervision   Lower Body Dressing Minimal Assist   Patient / Family Goals   Patient / Family Goal #1 To go  home   Short Term Goals   Short Term Goal # 1 Pt will don socks w/ SPV   Short Term Goal # 2 Pt will demo shower w/ SPV   Short Term Goal # 3 Pt will stand at sink and groom w/ SPV for 8 min         "

## 2020-06-01 NOTE — DISCHARGE INSTRUCTIONS
Instructions per Dr. West    OK to shower in AM with dressings on   DC (remove) dressing in 6 days   Follow-up with office MA and Dr. West for staple removal on 6/9/20- call for appointment   Follow up with Dr. West after completing 1 month CT of liver imaging       Exploratory Laparotomy, Adult, Care After  Refer to this sheet in the next few weeks. These instructions provide you with information about caring for yourself after your procedure. Your health care provider may also give you more specific instructions. Your treatment has been planned according to current medical practices, but problems sometimes occur. Call your health care provider if you have any problems or questions after your procedure.  WHAT TO EXPECT AFTER THE PROCEDURE  After your procedure, it is typical to have:  · Abdominal soreness.  · Fatigue.  · A sore throat from tubes in your throat.  · A lack of appetite.  HOME CARE INSTRUCTIONS  Medicines  · Take medicines only as directed by your health care provider.  · Do not drive or operate heavy machinery while taking pain medicine.  Incision Care  · There are many different ways to close and cover an incision, including stitches (sutures), skin glue, and adhesive strips. Follow your health care provider's instructions about:  ¨ Incision care.  ¨ Bandage (dressing) changes and removal.  ¨ Incision closure removal.  · Do not take showers or baths until your health care provider says that you can.  · Check your incision area daily for signs of infection. Watch for:  ¨ Redness.  ¨ Tenderness.  ¨ Swelling.  ¨ Drainage.  Activities  · Do not lift anything that is heavier than 10 pounds (4.5 kg) until your health care provider says that it is safe.  · Try to walk a little bit each day if your health care provider says that it is okay.  · Ask your health care provider when you can start to do your usual activities again, such as driving, going back to work, and having  sex.  Eating and Drinking  · You may eat what you usually eat. Include lots of whole grains, fruits, and vegetables in your diet. This will help to prevent constipation.  · Drink enough fluid to keep your urine clear or pale yellow.  General Instructions  · Keep all follow-up visits as directed by your health care provider. This is important.  SEEK MEDICAL CARE IF:   · You have a fever.  · You have chills.  · Your pain medicine is not helping.  · You have constipation or diarrhea.  · You have nausea or vomiting.  · You have drainage, redness, swelling, or pain at your incision site.  SEEK IMMEDIATE MEDICAL CARE IF:  · Your pain is getting worse.  · It has been more than 3 days since you been able to have a bowel movement.  · You have ongoing (persistent) vomiting.  · The edges of your incision open up.  · You have warmth, tenderness, and swelling in your calf.  · You have trouble breathing.  · You have chest pain.     This information is not intended to replace advice given to you by your health care provider. Make sure you discuss any questions you have with your health care provider.     Document Released: 08/01/2005 Document Revised: 01/08/2016 Document Reviewed: 08/05/2015  [employer] Interactive Patient Education ©2016 [employer]      Discharge Instructions    Discharged to home by car with friend. Discharged via wheelchair, hospital escort: Yes.  Special equipment needed: Not Applicable    Be sure to schedule a follow-up appointment with your primary care doctor or any specialists as instructed.     Discharge Plan:   Diet Plan: Discussed  Activity Level: Discussed  Confirmed Follow up Appointment: Patient to Call and Schedule Appointment  Confirmed Symptoms Management: Discussed  Medication Reconciliation Updated: Yes    I understand that a diet low in cholesterol, fat, and sodium is recommended for good health. Unless I have been given specific instructions below for another diet, I accept this instruction  as my diet prescription.   Other diet: Heart healthy    Special Instructions: None    · Is patient discharged on Warfarin / Coumadin?   No     Depression / Suicide Risk    As you are discharged from this [facility] facility, it is important to learn how to keep safe from harming yourself.    Recognize the warning signs:  · Abrupt changes in personality, positive or negative- including increase in energy   · Giving away possessions  · Change in eating patterns- significant weight changes-  positive or negative  · Change in sleeping patterns- unable to sleep or sleeping all the time   · Unwillingness or inability to communicate  · Depression  · Unusual sadness, discouragement and loneliness  · Talk of wanting to die  · Neglect of personal appearance   · Rebelliousness- reckless behavior  · Withdrawal from people/activities they love  · Confusion- inability to concentrate     If you or a loved one observes any of these behaviors or has concerns about self-harm, here's what you can do:  · Talk about it- your feelings and reasons for harming yourself  · Remove any means that you might use to hurt yourself (examples: pills, rope, extension cords, firearm)  · Get professional help from the community (Mental Health, Substance Abuse, psychological counseling)  · Do not be alone:Call your Safe Contact- someone whom you trust who will be there for you.  · Call your local CRISIS HOTLINE 149-2358 or 742-523-3902  · Call your local Children's Mobile Crisis Response Team [place] (224) 109-0343 or www.[employer]  · Call the toll free National Suicide Prevention Hotlines   · National Suicide Prevention Lifeline 124-779-AZXT (9811)  · National Hope Line Network 800-SUICIDE (831-3273)

## 2020-06-01 NOTE — PROGRESS NOTES
CHMAISHA Heath called and spoke to the patient via mobile phone. Introduced CCM services. Completed SDOH screening and outpatient assessment. Patient stated she currently has a PCP and will be calling and scheduling her follow up herself today as she was discharged today. Patient stated she lives alone in a single double aman trailer. Patient also stated she has a good support system with her family and friends. Patient also stated she has not had any difficulty with paying for basics. Patient declined all CCM services and denied needing my contact information for future needs. Patient will be discharged from [facility] services.     Community Health Worker Intake  • Social determinates of health intake completed  • Identified barriers to health. None   • Contact information provided to Karlene Walters. No, patient did not want contact information.  • Has PCP appointment scheduled = patient will scheduling her own follow up with her PCP.     Plan:     Discharge patient from CCM services. Patient declined.

## 2020-06-01 NOTE — DISCHARGE PLANNING
Anticipated Discharge Disposition: Home with family help and OP F/U    Action: RN CM completed chart review. Per MD Pt is stable and in good condition to home with close out-patient follow-up. Pt ostomy functioning will and pt has strong support sytem in place.     Barriers to Discharge: none at this time    Plan: Home with family help and OP F/U

## 2020-06-01 NOTE — CARE PLAN
Problem: Pain Management  Goal: Pain level will decrease to patient's comfort goal  Outcome: PROGRESSING AS EXPECTED  Note: Patient declines pain throughout shift.     Problem: Safety  Goal: Will remain free from falls  Outcome: PROGRESSING AS EXPECTED  Note: Bed locked in lowest position, treaded socks in place, call light within reach, activity provided.  Pt instructed to use call light for assistance.

## 2020-06-01 NOTE — THERAPY
"Physical Therapy   Daily Treatment     Patient Name: Karlene Walters  Age:  80 y.o., Sex:  female  Medical Record #: [id number]  Today's Date: 6/1/2020     Precautions: Fall Risk    Subjective    \"Do I get to go home today?\"    Assessment    Pt progressing well w/ therapy. Pt was able to perform all mobility at a SPV level. She does require a lot of time and effort to perform mobility d/t neck pain. Pt able to increase her tolerance to mobility from initial eval. She does fatigue quickly during gait and demonstrates increased WOB that can be controlled w/ standing rest breaks. Pt stating she will have assist at home. She has progressed to a level in which she can DC home w/ HH.    Plan    Continue current treatment plan.    Discharge recommendations:  Recommend home health transitional care for continued physical therapy services.        06/01/20 0823   Precautions   Precautions Fall Risk   Gait Analysis   Gait Level Of Assist Supervised   Assistive Device Front Wheel Walker   Distance (Feet) 250   # of Times Distance was Traveled 1   Deviation Bradykinetic   # of Stairs Climbed 2   Level of Assist with Stairs Minimal Assist   Comments Pt able to perform gait w/ no assist but quick fatigue. Extra time required to perform stairs w/ pt fatiguing quickly.   Bed Mobility    Supine to Sit Supervised   Sit to Supine Supervised   Scooting Supervised   Rolling   (sit pivot)   Comments A lot of time and effort required d/t ncek pain   Functional Mobility   Sit to Stand Supervised   Bed, Chair, Wheelchair Transfer Supervised   Transfer Method Other (Comments)  (Ambulating)   Mobility With FWW   Short Term Goals    Short Term Goal # 1 Pt will be SPV for supine<>sit in 6 txs to improve functional mobility.   Goal Outcome # 1 Progressing as expected   Short Term Goal # 2 Pt will be SPV for all transfers with FWW in 6 txs to improve functional mobility.   Goal Outcome # 2 Goal met   Short Term Goal # 3 Pt will be SPV for gait x " 150' with FWW in 6 txs to improve functional mobility.   Goal Outcome # 3 Goal met   Short Term Goal # 4 Pt will be SPV for up/down 4 steps with rail to be able to access her home.   Goal Outcome # 4 Progressing as expected

## 2020-06-01 NOTE — PROGRESS NOTES
Received report of patient at start of shift. Patient is AOx4, no complaints of pain. Assessment complete, on room air. Ostomy noted to RLQ, stoma pink/prolapsed. Staples to abdomen, clean/dry/intact. Discharge orders received. Patient states friend will arrive at 1200 to provide transportation home. Safety precautions in place.

## 2020-06-02 ENCOUNTER — APPOINTMENT (OUTPATIENT)
Dept: ONCOLOGY | Facility: MEDICAL CENTER | Age: 80
End: 2020-06-02
Attending: INTERNAL MEDICINE
Payer: MEDICARE

## 2020-06-02 NOTE — CARE PLAN
Problem: Knowledge Deficit  Goal: Knowledge of disease process/condition, treatment plan, diagnostic tests, and medications will improve  Outcome: PROGRESSING AS EXPECTED  Note: Discharge teaching provided to patient. All questions addressed.     Problem: Discharge Barriers/Planning  Goal: Patient's continuum of care needs will be met  Outcome: PROGRESSING AS EXPECTED  Note: Patient discharged home today per MD order.

## 2020-06-02 NOTE — PROGRESS NOTES
Late entry - Discharging patient home per physician order.  Discharged with self, patient states friend will be meeting her at [facility] to provide transportation home. Wheelchair escort provided to hospital exit by CNA.  Patient demonstrated understanding of discharge instructions, follow up appointments, home medications, prescriptions, and home care for surgical wound.  Island dressing applied to surgical incision, extra island dressings provided to patient. Patient ambulating with standby assistance, voiding without difficulty, pain well controlled, tolerating oral medications, oxygen saturation greater than 90%, tolerating diet.   Educational handouts given and discussed.  Patient verbalized understanding of discharge instructions and educational handouts.  All questions answered.  Belongings and paper prescription for oxycodone with patient at time of discharge.

## 2020-06-08 NOTE — PROGRESS NOTES
Subjective:   6/9/2020  9:50 AM  Primary care physician:Manan Quiñonez M.D.  Medical Oncologist: Eric Hardy M.D.    Chief Complaint:   Chief Complaint   Patient presents with   • Follow-Up     PO/STAPLES LIVER RESECTION  RETAL CA W/LIVER METS      Diagnosis:   1. Malignant neoplasm of rectum (HCC)     2. Secondary malignant neoplasm of liver (HCC)         History of presenting illness:  Karlene Wlaters  is a pleasant 80 y.o. year old female who presented with postop visit status post attempted right hepatectomy but it was felt to be unsafe due to the encroachment of the tumor in the right lobe near the portal pedicle on the hilum.  We proceeded with multiple ablations of both recurrent tumors.  She denies any fever or chills, nausea or vomiting.  Her ostomy is working well.  She is taking minimal pain medication.      Past Medical History:   Diagnosis Date   • Adverse effect of anesthesia     elevated BP postop   • Anesthesia    • Arrhythmia     occasional   • Blood clotting disorder (HCC) 08/2015    bilat PE    • Blood transfusion 1957   • Breath shortness     pt reports d/t chemo treatment; no O2; with moderate exertion; no c/o at this time   • Cancer (HCC) 09/2012    rectal cancer,  Liver CA-2015   • CATARACT     removed b/l   • Chemotherapy-induced neutropenia (HCC) 9/28/2016   • Chickenpox     history of   • Colon cancer (HCC)    • Dental disorder     dentures UPPERS AND LOWERS   • Elevated alkaline phosphatase level 11/15/2017   • Emphysema of lung (HCC)     COPD   • Fall    • Kiswahili measles     history of   • Heart murmur     as a child   • Hemorrhagic disorder (HCC)     on Xarelto    • High cholesterol    • Hypercholesteremia    • Hypertension     no meds currently    • Ileostomy in place (HCC)    • Ileostomy in place (HCC)    • Indigestion    • Influenza     history of   • Lightheadedness 9/17/2015   • Mumps     history of   • Obstruction     ileostomy   • Other specified symptom associated with female  genital organs     HYSTERECTOMY 1993   • Pneumonia 05/2017    tx'd with antibx   • Pulmonary embolism (HCC)    • 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)    • Renal insufficiency 3/14/2016   • Restless legs 5/6/2020   • Routine general medical examination at a health care facility 3/14/2016    3/14/16   • Seasonal allergies    • Swelling of both ankles     Lasix PRN   • Tonsillitis      Past Surgical History:   Procedure Laterality Date   • HEPATIC ABLATION  5/28/2020    Procedure: ABLATION, LESION, LIVER-TRISEGMENTECTOMY, MICROWAVE ABLATION, INTRA OPERATIVE ULTRA SOUND, SIMPLE RESECTED / REPAIR OF DIAPHRAGM;  Surgeon: Kit West M.D.;  Location: SURGERY [facility];  Service: General   • HEPATIC ABLATION LAPAROSCOPIC  11/15/2018    Procedure: HEPATIC ABLATION LAPAROSCOPIC.- SEGMENT 7/8;  Surgeon: Kit West M.D.;  Location: SURGERY [facility];  Service: General   • COLONOSCOPY WITH BIOPSY  8/23/2015    Procedure: COLONOSCOPY WITH BIOPSY;  Surgeon: Wilbur Glasgow M.D.;  Location: ENDOSCOPY [facility];  Service:    • HEPATIC ABLATION LAPAROSCOPIC  7/6/2015    Procedure: HEPATIC ABLATION LAPAROSCOPIC.;  Surgeon: Kit West M.D.;  Location: SURGERY [facility];  Service:    • CATH PLACEMENT Left 7/6/2015    Procedure: CATH PLACEMENT CEPHALIC POWERPORT;  Surgeon: Kit West M.D.;  Location: SURGERY [facility];  Service:    • FISTULA IN ANO REPAIR  1/28/2015    Performed by Kolton Montgomery M.D. at SURGERY [facility]   • PERINEAL PROCEDURE  1/28/2015    Performed by Kolton Montgomery M.D. at [facility]   • FISTULA IN ANO REPAIR  10/15/2014    Performed by Kolton Montgomery M.D. at SURGERY [facility]   • PERINEAL PROCEDURE  10/15/2014    Performed by Kolton Montgomery M.D. at [facility]   • IRRIGATION & DEBRIDEMENT GENERAL  2/19/2014    Performed by Kolton Montgomery M.D. at [facility]   • FLAP GRAFT   "2/19/2014    Performed by Kolton Montgomery M.D. at SURGERY [facility]   • PERINEAL PROCEDURE  12/18/2013    Performed by Kolton Montgomery M.D. at SURGERY [facility]   • MYOCUTANEOUS FLAP  12/18/2013    Performed by Kolton Montgomery M.D. at [facility]   • IRRIGATION & DEBRIDEMENT GENERAL  10/23/2013    Performed by Kolton Montgomery M.D. at SURGERY [facility]   • FISTULA IN ANO REPAIR  9/4/2013    Performed by Kolton Montgomery M.D. at [facility]   • FLAP ROTATION  9/4/2013    Performed by Kolton Montgomery M.D. at SURGERY [facility]   • RECOVERY  8/26/2013    Performed by [facility]Recovery Surgery at [facility] SAME DAY [facility]   • BIOPSY GENERAL  7/17/2013    Performed by Kolton Montgomery M.D. at [facility]   • PROCTOSCOPY  7/17/2013    Performed by Kolton Montgomery M.D. at [facility]   • FISTULA IN ANO REPAIR  2/27/2013    Performed by Kolton Montgomery M.D. at SURGERY [facility]   • SIGMOIDOSCOPY  2/27/2013    Performed by Kolton Montgomery M.D. at [facility]   • LAPAROSCOPY  10/8/2012    Performed by Kolton Montgomery M.D. at [facility]   • ILEO LOOP DIVERSION  10/8/2012    Performed by Kolton Montgomery M.D. at SURGERY [facility]   • COLECTOMY  9/26/2012    Performed by Kolton Montgomery M.D. at [facility]   • COLONOSCOPY FLEX W/ENDO US  9/20/2012    Performed by Harshil Bolton M.D. at [facility]   • OTHER  2009    left eye torn retina repair   • CATARACT EXTRACTION WITH IOL  2004    bilateral   • ABDOMINAL HYSTERECTOMY TOTAL  1983   • CYST EXCISION  1972    ovarian   • COLON RESECTION     • EYE SURGERY      cataracts   • HYSTERECTOMY LAPAROSCOPY     • PB REMV 2ND CATARACT,CORN-SCLER SECTN     • TONSILLECTOMY       Allergies   Allergen Reactions   • Oxaliplatin Anaphylaxis   • Codeine      \"gets drunk\"   • Pcn [Penicillins] Itching     itching   • Sulfa Drugs Itching     itching   • Tape Rash     PAPER TAPE OK     Outpatient " Encounter Medications as of 6/9/2020   Medication Sig Dispense Refill   • oxyCODONE immediate-release (ROXICODONE) 5 MG Tab Take 1 Tab by mouth every 6 hours for 8 days. 30 Tab 0   • potassium chloride ER (KLOR-CON) 10 MEQ tablet Take 1 Tab by mouth every day. 100 Tab 3   • rivaroxaban (XARELTO) 10 MG Tab tablet Take 1 Tab by mouth every day. 90 Tab 1   • lidocaine-prilocaine (EMLA) 2.5-2.5 % Cream APPLY A THICK FILM OVER THE PORT ACCESS SITE PRIOR TO ACCESS 30 g 3   • ondansetron (ZOFRAN) 4 MG Tab tablet Take 1 Tab by mouth as needed. 30 Tab 3   • metronidazole (METROCREAM) 0.75 % cream Apply 1 Each to affected area(s) 2 times a day.     • cyanocobalamin (VITAMIN B-12) 500 MCG Tab Take 1,000 mcg by mouth every day.     • Multiple Vitamins-Minerals (CENTRUM SILVER PO) Take 1 Tab by mouth every day.     • Cholecalciferol (VITAMIN D3) 400 UNITS CAPS Take 1 Cap by mouth every day.     • loratadine (CLARITIN) 10 MG TABS Take 10 mg by mouth every day. Indications: Hayfever       No facility-administered encounter medications on file as of 6/9/2020.      Social History     Socioeconomic History   • Marital status:      Spouse name: Not on file   • Number of children: Not on file   • Years of education: Not on file   • Highest education level: Not on file   Occupational History   • Not on file   Social Needs   • Financial resource strain: Not hard at all   • Food insecurity     Worry: Never true     Inability: Never true   • Transportation needs     Medical: No     Non-medical: No   Tobacco Use   • Smoking status: Never Smoker   • Smokeless tobacco: Never Used   Substance and Sexual Activity   • Alcohol use: No   • Drug use: No   • Sexual activity: Never     Partners: Male     Birth control/protection: Post-Menopausal   Lifestyle   • Physical activity     Days per week: Not on file     Minutes per session: Not on file   • Stress: Not on file   Relationships   • Social connections     Talks on phone: Not on file      Gets together: Not on file     Attends Scientology service: Not on file     Active member of club or organization: Not on file     Attends meetings of clubs or organizations: Not on file     Relationship status: Not on file   • Intimate partner violence     Fear of current or ex partner: Not on file     Emotionally abused: Not on file     Physically abused: Not on file     Forced sexual activity: Not on file   Other Topics Concern   • Primary/coprimary nurse & associates Not Asked   • Family contact information Not Asked   • OK to release patient information to the following Not Asked   • Patient preferred routine/Privacy concerns Not Asked   • Patient likes and dislikes Not Asked   • Participating In Research Study Not Asked   • Miscellaneous Not Asked   Social History Narrative   • Not on file      Social History     Tobacco Use   Smoking Status Never Smoker   Smokeless Tobacco Never Used     Social History     Substance and Sexual Activity   Alcohol Use No     Social History     Substance and Sexual Activity   Drug Use No        Family History   Problem Relation Age of Onset   • Heart Disease Mother    • Heart Failure Mother    • Hypertension Mother    • Hyperlipidemia Mother    • Cancer Mother    • Cancer Maternal Aunt 60        breast ca   • Lung Disease Brother         COPD/Emphysema/Smoker   • Cancer Brother         prostate cancer   • Alcohol/Drug Brother         Alcohol   • Kidney Disease Father         renal failure       Review of Systems   Respiratory: Positive for shortness of breath.    All other systems reviewed and are negative.       Objective:   /80 (BP Location: Right arm, Patient Position: Sitting, BP Cuff Size: Adult)   Pulse 100   Temp 36.9 °C (98.4 °F) (Temporal)   Wt 75.8 kg (167 lb)   LMP  (LMP Unknown)   SpO2 97%   BMI 27.79 kg/m²     Physical Exam   Abdominal: Soft. Bowel sounds are normal. There is abdominal tenderness.   Ostomy functioning well, she has discomfort around the  incision.  Her incision is clean, dry, and intact.  There is no erythema or drainage.       Labs:  Results for KANG HALL (MRN [id number]) as of 6/8/2020 10:31   Ref. Range 6/1/2020 01:31   WBC Latest Ref Range: 4.8 - 10.8 K/uL 8.4   RBC Latest Ref Range: 4.20 - 5.40 M/uL 4.04 (L)   Hemoglobin Latest Ref Range: 12.0 - 16.0 g/dL 10.7 (L)   Hematocrit Latest Ref Range: 37.0 - 47.0 % 34.3 (L)   MCV Latest Ref Range: 81.4 - 97.8 fL 84.9   MCH Latest Ref Range: 27.0 - 33.0 pg 26.5 (L)   MCHC Latest Ref Range: 33.6 - 35.0 g/dL 31.2 (L)   RDW Latest Ref Range: 35.9 - 50.0 fL 62.5 (H)   Platelet Count Latest Ref Range: 164 - 446 K/uL 179   MPV Latest Ref Range: 9.0 - 12.9 fL 10.4   Sodium Latest Ref Range: 135 - 145 mmol/L 133 (L)   Potassium Latest Ref Range: 3.6 - 5.5 mmol/L 3.8   Chloride Latest Ref Range: 96 - 112 mmol/L 102   Co2 Latest Ref Range: 20 - 33 mmol/L 24   Anion Gap Latest Ref Range: 7.0 - 16.0  7.0   Glucose Latest Ref Range: 65 - 99 mg/dL 114 (H)   Bun Latest Ref Range: 8 - 22 mg/dL 12   Creatinine Latest Ref Range: 0.50 - 1.40 mg/dL 0.66   GFR If  Latest Ref Range: >60 mL/min/1.73 m 2 >60   GFR If Non  Latest Ref Range: >60 mL/min/1.73 m 2 >60   Calcium Latest Ref Range: 8.5 - 10.5 mg/dL 8.8   AST(SGOT) Latest Ref Range: 12 - 45 U/L 106 (H)   ALT(SGPT) Latest Ref Range: 2 - 50 U/L 144 (H)   Alkaline Phosphatase Latest Ref Range: 30 - 99 U/L 144 (H)   Total Bilirubin Latest Ref Range: 0.1 - 1.5 mg/dL 0.6   Albumin Latest Ref Range: 3.2 - 4.9 g/dL 2.8 (L)   Total Protein Latest Ref Range: 6.0 - 8.2 g/dL 5.5 (L)   Globulin Latest Ref Range: 1.9 - 3.5 g/dL 2.7   A-G Ratio Latest Units: g/dL 1.0       Imaging:  Per my read,     PET 5/14/20     IMPRESSION:        1. Necrotic mass in the right hepatic dome with increased uptake, in keeping with viable metastasis.     2. Additional hypermetabolic mass in the inferior right hepatic lobe is consistent with metastasis as  well.     3. No other increased uptake identified.     CT CHEST 5/7/20     IMPRESSION:     1.  New ill-defined mass within the right lower lobe anteriorly and inferiorly along the hepatic fissure and at the level of the right hemidiaphragm. This measures 2.6 x 1.6 cm in size. Differential diagnosis includes metastatic disease as well as an   area of inflammatory change/consolidation.     2.  Atherosclerotic vascular calcification.        CT 5/7/20     IMPRESSION:     1.  Again seen lesion within the right lobe of the liver within the hepatic dome which measures 3.2 x 2.9 cm in size. This is not significantly changed from comparison. There is again seen peripheral enhancement and some peripheral calcification   superiorly.     2.  Second arterial phase enhancing lesion within the right lobe of the liver inferiorly measuring 2.5 x 2.3 cm in size possibly representing a second metastatic lesion.     3.  Again seen ill-defined opacity within the right lung base adjacent to the hepatic lesion, more prominent on the current examination.     4.  Surgical change consistent with colonic resection and right lower quadrant ostomy site.      Pathology:  FINAL DIAGNOSIS:     A. Falciform ligament:          Portion of benign fibrovascular adipose tissue.          No malignancy identified.     Procedures: 5/28/20    1. Exploratory laparotomy.  2. Open microwave thermal ablation x10 involving 2 separate lesions in the   right lobe and medial segment of the left lobe.  This was done with 6   overlapping 100 watt at 2-minute ablation giving us 6 overlapping 3.2x4.0 cm   ablation cavities.  The second ablation was essentially right at the origin of   the right portal pedicle approximately 2 cm away from the left portal   pedicle, which most of her liver function is from that left lobe.  She went   over a single ablation of 100 watt at 2 minutes giving us a 3x3.4 cm ablation   of a 2 cm tumor.  The rest of her liver was clean.  We were  confident that we   were away enough from the hilum and the left portal pedicle, but we were   unable to do the right lobe resection due to involvement of the diaphragm,   which we had to resect and repair as well as the main right portal pedicle   near the hilum, we felt that we were going to risk injuring her left lobe   pedicle.  3. Resection and simple repair of right hemidiaphragm.       Diagnosis:     1. Malignant neoplasm of rectum (HCC)     2. Secondary malignant neoplasm of liver (HCC)             Medical Decision Making:  Today's Assessment / Status / Plan:     In light of the present findings, the patient will follow-up with me in 2 weeks after she undergoes repeat CT scan of the liver to assess the ablation cavities.  This imaging has been set up and she has an appointment to see me in the afternoon here in the clinic as for starting chemotherapy, she is free to start chemotherapy after 3 weeks from surgery.    I, Dr. West have entered, reviewed and confirmed the above diagnoses related to this patient on this date of service, 6/9/2020  9:50 AM.    She agreed and verbalized her agreement and understanding with the current plan. I answered all questions and concerns she has at this time and advised her to call at any time in the interim with questions or concerns in regards to her care.    Thank you for allowing me to participate in her care, I will continue to follow closely.       Please note that this dictation was created using voice recognition software. I have made every reasonable attempt to correct obvious errors, but I expect that there are errors of grammar and possibly content that I did not discover before finalizing the note.     Thank you for this consultation and allowing me to participate in your patient's care. If I can be of further service please contact my office.

## 2020-06-09 ENCOUNTER — OFFICE VISIT (OUTPATIENT)
Dept: SURGICAL ONCOLOGY | Facility: MEDICAL CENTER | Age: 80
End: 2020-06-09
Payer: MEDICARE

## 2020-06-09 VITALS
DIASTOLIC BLOOD PRESSURE: 80 MMHG | TEMPERATURE: 98.4 F | HEART RATE: 100 BPM | BODY MASS INDEX: 27.79 KG/M2 | OXYGEN SATURATION: 97 % | WEIGHT: 167 LBS | SYSTOLIC BLOOD PRESSURE: 152 MMHG

## 2020-06-09 DIAGNOSIS — C78.7 SECONDARY MALIGNANT NEOPLASM OF LIVER (HCC): ICD-10-CM

## 2020-06-09 DIAGNOSIS — C20 MALIGNANT NEOPLASM OF RECTUM (HCC): ICD-10-CM

## 2020-06-09 PROCEDURE — 99024 POSTOP FOLLOW-UP VISIT: CPT | Performed by: SURGERY

## 2020-06-09 ASSESSMENT — ENCOUNTER SYMPTOMS: SHORTNESS OF BREATH: 1

## 2020-06-09 ASSESSMENT — FIBROSIS 4 INDEX: FIB4 SCORE: 3.95

## 2020-06-09 NOTE — PATIENT INSTRUCTIONS
the patient will see me in 2 weeks following her repeat imaging to evaluateThe ablation cavities and any other metastatic lesions

## 2020-06-12 RX ORDER — DEXAMETHASONE SODIUM PHOSPHATE 4 MG/ML
8 INJECTION, SOLUTION INTRA-ARTICULAR; INTRALESIONAL; INTRAMUSCULAR; INTRAVENOUS; SOFT TISSUE ONCE
Status: CANCELLED | OUTPATIENT
Start: 2020-06-30

## 2020-06-12 RX ORDER — DEXTROSE MONOHYDRATE 50 MG/ML
INJECTION, SOLUTION INTRAVENOUS CONTINUOUS
Status: CANCELLED | OUTPATIENT
Start: 2020-06-30

## 2020-06-12 RX ORDER — HEPARIN SODIUM (PORCINE) LOCK FLUSH IV SOLN 100 UNIT/ML 100 UNIT/ML
500 SOLUTION INTRAVENOUS PRN
Status: CANCELLED | OUTPATIENT
Start: 2020-06-30

## 2020-06-12 RX ORDER — LIDOCAINE HYDROCHLORIDE 10 MG/ML
10 INJECTION, SOLUTION INFILTRATION; PERINEURAL ONCE
Status: CANCELLED | OUTPATIENT
Start: 2020-06-30

## 2020-06-12 RX ORDER — HEPARIN SODIUM (PORCINE) LOCK FLUSH IV SOLN 100 UNIT/ML 100 UNIT/ML
500 SOLUTION INTRAVENOUS PRN
Status: CANCELLED | OUTPATIENT
Start: 2020-07-02

## 2020-06-12 RX ORDER — ONDANSETRON 8 MG/1
8 TABLET, ORALLY DISINTEGRATING ORAL
Status: CANCELLED | OUTPATIENT
Start: 2020-06-30

## 2020-06-12 RX ORDER — ONDANSETRON 2 MG/ML
4 INJECTION INTRAMUSCULAR; INTRAVENOUS
Status: CANCELLED | OUTPATIENT
Start: 2020-06-30

## 2020-06-12 RX ORDER — PROCHLORPERAZINE MALEATE 10 MG
10 TABLET ORAL EVERY 6 HOURS PRN
Status: CANCELLED | OUTPATIENT
Start: 2020-06-30

## 2020-06-12 NOTE — PROGRESS NOTES
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 Initial dx in 2012. Liver and lung metastases diagnosed in 2015. She had liver ablation and then XELOX but had allergic reaction and switched to single agent 5FU. She was on q.o.w 5FU for years (98 cycles) gradually changed to q 3 weeks and then q 4 weeks. She has had Y90 in the past. After the change to q 4 weeks she was noted to have growing disease in the liver and Dr. West took her to surgery hoping to do a resection but the tumor was felt to be too close to the hilar and was therefore ablated instead. Had a lot of RUQ pain from surgery.   Original tumor was KRAS mutated. May need a Guardant 360 if progresses    ROS:    Constitutional: Feels weak  Skin:No jaundice  HENT:No c/o's  Cardiovascular: No chest pain  Respiratory: Chronically SOB  GI: Colostomy functioning  : No c/o's  Musculoskeletal: no bone pain  Neuro:No c/o's  Psych:No c/o's    PMH:      Allergies   Allergen Reactions   • Oxaliplatin Anaphylaxis   • Codeine      \"gets drunk\"   • Pcn [Penicillins] Itching     itching   • Sulfa Drugs Itching     itching   • Tape Rash     PAPER TAPE OK       Past Medical History:   Diagnosis Date   • Adverse effect of anesthesia     elevated BP postop   • Anesthesia    • Arrhythmia     occasional   • Blood clotting disorder (HCC) 08/2015    bilat PE    • Blood transfusion 1957   • Breath shortness     pt reports d/t chemo treatment; no O2; with moderate exertion; no c/o at this time   • Cancer (HCC) 09/2012    rectal cancer,  Liver CA-2015   • CATARACT     removed b/l   • Chemotherapy-induced neutropenia (HCC) 9/28/2016   • Chickenpox     history of   • Colon cancer (HCC)    • Dental disorder     dentures UPPERS AND LOWERS   • Elevated alkaline phosphatase level 11/15/2017   • Emphysema of lung (HCC)     COPD   • Fall    • Arabic measles     history of   • Heart murmur     as a child   • " Hemorrhagic disorder (HCC)     on Xarelto    • High cholesterol    • Hypercholesteremia    • Hypertension     no meds currently    • Ileostomy in place (HCC)    • Ileostomy in place (HCC)    • Indigestion    • Influenza     history of   • Lightheadedness 9/17/2015   • Mumps     history of   • Obstruction     ileostomy   • Other specified symptom associated with female genital organs     HYSTERECTOMY 1993   • Pneumonia 05/2017    tx'd with antibx   • Pulmonary embolism (HCC)    • 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)    • Renal insufficiency 3/14/2016   • Restless legs 5/6/2020   • Routine general medical examination at a [facility] care facility 3/14/2016    3/14/16   • Seasonal allergies    • Swelling of both ankles     Lasix PRN   • Tonsillitis         Past Surgical History:   Procedure Laterality Date   • HEPATIC ABLATION  5/28/2020    Procedure: ABLATION, LESION, LIVER-TRISEGMENTECTOMY, MICROWAVE ABLATION, INTRA OPERATIVE ULTRA SOUND, SIMPLE RESECTED / REPAIR OF DIAPHRAGM;  Surgeon: Kit West M.D.;  Location: SURGERY [facility];  Service: General   • HEPATIC ABLATION LAPAROSCOPIC  11/15/2018    Procedure: HEPATIC ABLATION LAPAROSCOPIC.- SEGMENT 7/8;  Surgeon: Kit West M.D.;  Location: SURGERY [facility];  Service: General   • COLONOSCOPY WITH BIOPSY  8/23/2015    Procedure: COLONOSCOPY WITH BIOPSY;  Surgeon: Wilbur Glasgow M.D.;  Location: ENDOSCOPY [facility];  Service:    • HEPATIC ABLATION LAPAROSCOPIC  7/6/2015    Procedure: HEPATIC ABLATION LAPAROSCOPIC.;  Surgeon: Kit West M.D.;  Location: SURGERY [facility];  Service:    • CATH PLACEMENT Left 7/6/2015    Procedure: CATH PLACEMENT CEPHALIC POWERPORT;  Surgeon: Kit West M.D.;  Location: SURGERY [facility];  Service:    • FISTULA IN ANO REPAIR  1/28/2015    Performed by Kolton Montgomery M.D. at [facility]   • PERINEAL PROCEDURE  1/28/2015    Performed by  Kolton Montgomery M.D. at [facility]   • FISTULA IN ANO REPAIR  10/15/2014    Performed by Kolton Montgomery M.D. at SURGERY [facility]   • PERINEAL PROCEDURE  10/15/2014    Performed by Kolton Montgomery M.D. at [facility]   • IRRIGATION & DEBRIDEMENT GENERAL  2/19/2014    Performed by Kolton Montgomery M.D. at SURGERY [facility]   • FLAP GRAFT  2/19/2014    Performed by Kolton Montgomery M.D. at [facility]   • PERINEAL PROCEDURE  12/18/2013    Performed by Kolton Montgomery M.D. at [facility]   • MYOCUTANEOUS FLAP  12/18/2013    Performed by Kolton Montgomery M.D. at [facility]   • IRRIGATION & DEBRIDEMENT GENERAL  10/23/2013    Performed by Kolton Montgomery M.D. at [facility]   • FISTULA IN ANO REPAIR  9/4/2013    Performed by Kolton Montgomery M.D. at [facility]   • FLAP ROTATION  9/4/2013    Performed by Kolton Montgomery M.D. at [facility]   • RECOVERY  8/26/2013    Performed by Cath-Recovery Surgery at [facility] SAME DAY [facility]   • BIOPSY GENERAL  7/17/2013    Performed by Kolton Montgomery M.D. at [facility]   • PROCTOSCOPY  7/17/2013    Performed by Kolton Montgomery M.D. at [facility]   • FISTULA IN ANO REPAIR  2/27/2013    Performed by Kolton Montgomery M.D. at [facility]   • SIGMOIDOSCOPY  2/27/2013    Performed by Kolton Montgomery M.D. at [facility]   • LAPAROSCOPY  10/8/2012    Performed by Kolton Montgomery M.D. at [facility]   • ILEO LOOP DIVERSION  10/8/2012    Performed by Kolton Montgomery M.D. at [facility]   • COLECTOMY  9/26/2012    Performed by Kolton Montgomery M.D. at [facility]   • COLONOSCOPY FLEX W/ENDO US  9/20/2012    Performed by Harshil Bolton M.D. at SURGERY [facility]   • OTHER  2009    left eye torn retina repair   • CATARACT EXTRACTION WITH IOL  2004    bilateral   • ABDOMINAL HYSTERECTOMY TOTAL  1983   • CYST EXCISION  1972     "ovarian   • COLON RESECTION     • EYE SURGERY      cataracts   • HYSTERECTOMY LAPAROSCOPY     • PB REMV 2ND CATARACT,CORN-SCLER SECTN     • TONSILLECTOMY          Medications:    Current Outpatient Medications on File Prior to Visit   Medication Sig Dispense Refill   • potassium chloride ER (KLOR-CON) 10 MEQ tablet Take 1 Tab by mouth every day. 100 Tab 3   • rivaroxaban (XARELTO) 10 MG Tab tablet Take 1 Tab by mouth every day. 90 Tab 1   • lidocaine-prilocaine (EMLA) 2.5-2.5 % Cream APPLY A THICK FILM OVER THE PORT ACCESS SITE PRIOR TO ACCESS 30 g 3   • ondansetron (ZOFRAN) 4 MG Tab tablet Take 1 Tab by mouth as needed. 30 Tab 3   • metronidazole (METROCREAM) 0.75 % cream Apply 1 Each to affected area(s) 2 times a day.     • cyanocobalamin (VITAMIN B-12) 500 MCG Tab Take 1,000 mcg by mouth every day.     • Multiple Vitamins-Minerals (CENTRUM SILVER PO) Take 1 Tab by mouth every day.     • Cholecalciferol (VITAMIN D3) 400 UNITS CAPS Take 1 Cap by mouth every day.     • loratadine (CLARITIN) 10 MG TABS Take 10 mg by mouth every day. Indications: Hayfever       No current facility-administered medications on file prior to visit.        Social History     Tobacco Use   • Smoking status: Never Smoker   • Smokeless tobacco: Never Used   Substance Use Topics   • Alcohol use: No        Family History   Problem Relation Age of Onset   • Heart Disease Mother    • Heart Failure Mother    • Hypertension Mother    • Hyperlipidemia Mother    • Cancer Mother    • Cancer Maternal Aunt 60        breast ca   • Lung Disease Brother         COPD/Emphysema/Smoker   • Cancer Brother         prostate cancer   • Alcohol/Drug Brother         Alcohol   • Kidney Disease Father         renal failure        Objective    Vitals:    /76 (BP Location: Right arm, Patient Position: Sitting, BP Cuff Size: Adult)   Pulse 96   Temp 36.6 °C (97.8 °F) (Temporal)   Resp 18   Ht 1.651 m (5' 5\")   Wt 74.2 kg (163 lb 11.1 oz)   LMP  (LMP " Unknown)   SpO2 97%   BMI 27.24 kg/m²     Physical Exam:    Appears well-developed and well-nourished. No distress.    Head -  Normocephalic .   Eyes - Pupils are equal, round, and reactive to light. Conjunctivae and EOM are normal. No scleral icterus.   Ears - normal hearing  Mouth - Throat: Oropharynx is clear and moist. No oropharyngeal exudate  Neck - Normal range of motion. Neck supple. No thyromegaly  Cardiovascular - Normal rate, regular rhythm, normal heart sounds and intact distal pulses. No  gallop, murmur or rub  Pulmonary - Normal breath sounds.  No wheeze, rales or rhonci  Breast - symmetrical. No mass on indentation.  Abdominal -Soft. No distension, tenderness, organomegaly or mass. Colostomy on right  Extremities-  No edema or tenderness.    Nodes - No submental, submandibular, preauricular, cervical, axillary or inguinal adenopathy.    Neurological -   Alert and oriented to person, place, and time. No focal findings  Skin - Skin is warm and dry. No rash noted. Not diaphoretic. No erythema. No pallor. No jaundice   Psychiatric -  Normal mood and affect.    Labs:    Results for KANG HALL (MRN [id number])   Ref. Range 6/1/2020 01:31   WBC Latest Ref Range: 4.8 - 10.8 K/uL 8.4   RBC Latest Ref Range: 4.20 - 5.40 M/uL 4.04 (L)   Hemoglobin Latest Ref Range: 12.0 - 16.0 g/dL 10.7 (L)   Hematocrit Latest Ref Range: 37.0 - 47.0 % 34.3 (L)   MCV Latest Ref Range: 81.4 - 97.8 fL 84.9   MCH Latest Ref Range: 27.0 - 33.0 pg 26.5 (L)   MCHC Latest Ref Range: 33.6 - 35.0 g/dL 31.2 (L)   RDW Latest Ref Range: 35.9 - 50.0 fL 62.5 (H)   Platelet Count Latest Ref Range: 164 - 446 K/uL 179   Results for KANG HALL (MRN [id number])    Ref. Range 5/29/2020 04:01 5/30/2020 03:47 5/31/2020 06:49 6/1/2020 01:31   Sodium Latest Ref Range: 135 - 145 mmol/L 138 134 (L) 134 (L) 133 (L)   Potassium Latest Ref Range: 3.6 - 5.5 mmol/L 4.0 4.8 3.7 3.8   Chloride Latest Ref Range: 96 - 112 mmol/L 103 100 101 102   Co2  Latest Ref Range: 20 - 33 mmol/L 23 25 25 24   Anion Gap Latest Ref Range: 7.0 - 16.0  12.0 9.0 8.0 7.0   Glucose Latest Ref Range: 65 - 99 mg/dL 159 (H) 101 (H) 99 114 (H)   Bun Latest Ref Range: 8 - 22 mg/dL 13 12 12 12   Creatinine Latest Ref Range: 0.50 - 1.40 mg/dL 0.78 0.74 0.68 0.66   GFR If  Latest Ref Range: >60 mL/min/1.73 m 2 >60 >60 >60 >60   GFR If Non  Latest Ref Range: >60 mL/min/1.73 m 2 >60 >60 >60 >60   Calcium Latest Ref Range: 8.5 - 10.5 mg/dL 9.5 9.0 9.1 8.8   AST(SGOT) Latest Ref Range: 12 - 45 U/L 764 (H) 390 (H) 182 (H) 106 (H)   ALT(SGPT) Latest Ref Range: 2 - 50 U/L 388 (H) 286 (H) 192 (H) 144 (H)   Alkaline Phosphatase Latest Ref Range: 30 - 99 U/L 146 (H) 136 (H) 138 (H) 144 (H)   Total Bilirubin Latest Ref Range: 0.1 - 1.5 mg/dL 0.9 0.8 0.8 0.6   Albumin Latest Ref Range: 3.2 - 4.9 g/dL 3.6 3.2 2.7 (L) 2.8 (L)   Total Protein Latest Ref Range: 6.0 - 8.2 g/dL 6.2 5.5 (L) 5.5 (L) 5.5 (L)   Globulin Latest Ref Range: 1.9 - 3.5 g/dL 2.6 2.3 2.8 2.7   Results for KANG HALL (MRN [id number])    Ref. Range 2/25/2020 07:09 3/16/2020 09:00 4/7/2020 08:20 5/5/2020 07:20   Carcinoembryonic Antigen Latest Ref Range: 0.0 - 3.0 ng/mL 1.2 1.3 1.7 1.5     Assessment    Imp:    Visit Diagnosis:    1. Malignant neoplasm of rectum (HCC)     2. Secondary malignant neoplasm of liver (HCC)           Plan:  Back to q.o.w 5FU  See me after next CT in 2 weeks      Lucho Steele M.D.

## 2020-06-15 ENCOUNTER — OFFICE VISIT (OUTPATIENT)
Dept: HEMATOLOGY ONCOLOGY | Facility: MEDICAL CENTER | Age: 80
End: 2020-06-15
Payer: MEDICARE

## 2020-06-15 VITALS
SYSTOLIC BLOOD PRESSURE: 124 MMHG | HEART RATE: 96 BPM | OXYGEN SATURATION: 97 % | TEMPERATURE: 97.8 F | DIASTOLIC BLOOD PRESSURE: 76 MMHG | HEIGHT: 65 IN | WEIGHT: 163.69 LBS | BODY MASS INDEX: 27.27 KG/M2 | RESPIRATION RATE: 18 BRPM

## 2020-06-15 DIAGNOSIS — C78.7 SECONDARY MALIGNANT NEOPLASM OF LIVER (HCC): Chronic | ICD-10-CM

## 2020-06-15 DIAGNOSIS — C20 MALIGNANT NEOPLASM OF RECTUM (HCC): Chronic | ICD-10-CM

## 2020-06-15 PROCEDURE — 99214 OFFICE O/P EST MOD 30 MIN: CPT | Performed by: INTERNAL MEDICINE

## 2020-06-15 RX ORDER — LIDOCAINE HYDROCHLORIDE 10 MG/ML
10 INJECTION, SOLUTION INFILTRATION; PERINEURAL ONCE
Status: CANCELLED | OUTPATIENT
Start: 2020-07-14

## 2020-06-15 RX ORDER — DEXTROSE MONOHYDRATE 50 MG/ML
INJECTION, SOLUTION INTRAVENOUS CONTINUOUS
Status: CANCELLED | OUTPATIENT
Start: 2020-07-14

## 2020-06-15 RX ORDER — ONDANSETRON 8 MG/1
8 TABLET, ORALLY DISINTEGRATING ORAL
Status: CANCELLED | OUTPATIENT
Start: 2020-07-14

## 2020-06-15 RX ORDER — DEXAMETHASONE SODIUM PHOSPHATE 4 MG/ML
8 INJECTION, SOLUTION INTRA-ARTICULAR; INTRALESIONAL; INTRAMUSCULAR; INTRAVENOUS; SOFT TISSUE ONCE
Status: CANCELLED | OUTPATIENT
Start: 2020-07-14

## 2020-06-15 RX ORDER — HEPARIN SODIUM (PORCINE) LOCK FLUSH IV SOLN 100 UNIT/ML 100 UNIT/ML
500 SOLUTION INTRAVENOUS PRN
Status: CANCELLED | OUTPATIENT
Start: 2020-07-16

## 2020-06-15 RX ORDER — ONDANSETRON 2 MG/ML
4 INJECTION INTRAMUSCULAR; INTRAVENOUS
Status: CANCELLED | OUTPATIENT
Start: 2020-07-14

## 2020-06-15 RX ORDER — HEPARIN SODIUM (PORCINE) LOCK FLUSH IV SOLN 100 UNIT/ML 100 UNIT/ML
500 SOLUTION INTRAVENOUS PRN
Status: CANCELLED | OUTPATIENT
Start: 2020-07-14

## 2020-06-15 RX ORDER — PROCHLORPERAZINE MALEATE 10 MG
10 TABLET ORAL EVERY 6 HOURS PRN
Status: CANCELLED | OUTPATIENT
Start: 2020-07-14

## 2020-06-15 ASSESSMENT — FIBROSIS 4 INDEX: FIB4 SCORE: 3.95

## 2020-06-15 NOTE — PROGRESS NOTES
"Pharmacy Chemotherapy Verification    Patient Name: Karlene Walters   Dx: Colon CA        Cycle 106 ([place] cycle 98)    Previous treatment: 5/5/20    Protocol: 5-FU     IV over 2 hours on Day 1, followed by    IVP on Day 1, followed by                               -- 10/31/17: delete Leucovorin and 5-FU push d/t neutropenia per Dr. Hardy   Fluorouracil  continuous infusion over 46-48 hours                              -- 20% reduction (1920 mg/m²) to be continued starting C28 per C Alsop APRN    for 12 cycles (adjuvant) or until disease progression or unacceptable toxicity - q28 days per MD orders for tolerance  NCCN Guidelines for Colon Cancer. V.2.2015.  Jay T, et al. Eur J Cancer.1999;35(9):1343-7.      Allergies: Codeine; Oxaliplatin; Pcn; Sulfa drugs; and Tape     /72   Pulse 96   Temp 36.7 °C (98 °F) (Temporal)   Resp 18   Ht 1.63 m (5' 4.17\")   Wt 74 kg (163 lb 2.3 oz)   LMP  (LMP Unknown)   SpO2 100%   BMI 27.85 kg/m²  Body surface area is 1.83 meters squared.     Labs 6/16/20:  ANC~ 3090 Plt = 348k   Hgb = 11.7     SCr = 0.64 mg/dL CrCl ~ 75 mL/min   AST/ALT/ALK  = 23/20/206 TBili = 0.4     Fluorouracil (5-FU) 1920 mg/m² x 1.83 m² = 3513.6 mg              <10% difference, okay to treat with final dose = 3515 mg CIVI   Via CADD pump for home infusion over 46 hours @ 1.5 mL/hr    Asha Krishnan, PharmD                            "

## 2020-06-16 ENCOUNTER — OUTPATIENT INFUSION SERVICES (OUTPATIENT)
Dept: ONCOLOGY | Facility: MEDICAL CENTER | Age: 80
End: 2020-06-16
Attending: INTERNAL MEDICINE
Payer: MEDICARE

## 2020-06-16 VITALS
HEART RATE: 96 BPM | DIASTOLIC BLOOD PRESSURE: 72 MMHG | TEMPERATURE: 98 F | SYSTOLIC BLOOD PRESSURE: 150 MMHG | BODY MASS INDEX: 27.85 KG/M2 | WEIGHT: 163.14 LBS | HEIGHT: 64 IN | OXYGEN SATURATION: 100 % | RESPIRATION RATE: 18 BRPM

## 2020-06-16 DIAGNOSIS — C20 MALIGNANT NEOPLASM OF RECTUM (HCC): ICD-10-CM

## 2020-06-16 LAB
ALBUMIN SERPL BCP-MCNC: 3.4 G/DL (ref 3.2–4.9)
ALBUMIN/GLOB SERPL: 1.1 G/DL
ALP SERPL-CCNC: 206 U/L (ref 30–99)
ALT SERPL-CCNC: 20 U/L (ref 2–50)
ANION GAP SERPL CALC-SCNC: 11 MMOL/L (ref 7–16)
AST SERPL-CCNC: 23 U/L (ref 12–45)
BASOPHILS # BLD AUTO: 1.3 % (ref 0–1.8)
BASOPHILS # BLD: 0.07 K/UL (ref 0–0.12)
BILIRUB SERPL-MCNC: 0.4 MG/DL (ref 0.1–1.5)
BUN SERPL-MCNC: 11 MG/DL (ref 8–22)
CALCIUM SERPL-MCNC: 9.3 MG/DL (ref 8.5–10.5)
CEA SERPL-MCNC: 0.9 NG/ML (ref 0–3)
CHLORIDE SERPL-SCNC: 104 MMOL/L (ref 96–112)
CO2 SERPL-SCNC: 22 MMOL/L (ref 20–33)
CREAT SERPL-MCNC: 0.64 MG/DL (ref 0.5–1.4)
EOSINOPHIL # BLD AUTO: 0.36 K/UL (ref 0–0.51)
EOSINOPHIL NFR BLD: 6.9 % (ref 0–6.9)
ERYTHROCYTE [DISTWIDTH] IN BLOOD BY AUTOMATED COUNT: 60.2 FL (ref 35.9–50)
GLOBULIN SER CALC-MCNC: 3.2 G/DL (ref 1.9–3.5)
GLUCOSE SERPL-MCNC: 90 MG/DL (ref 65–99)
HCT VFR BLD AUTO: 38.4 % (ref 37–47)
HGB BLD-MCNC: 11.7 G/DL (ref 12–16)
IMM GRANULOCYTES # BLD AUTO: 0.02 K/UL (ref 0–0.11)
IMM GRANULOCYTES NFR BLD AUTO: 0.4 % (ref 0–0.9)
LYMPHOCYTES # BLD AUTO: 1.15 K/UL (ref 1–4.8)
LYMPHOCYTES NFR BLD: 22 % (ref 22–41)
MCH RBC QN AUTO: 26.2 PG (ref 27–33)
MCHC RBC AUTO-ENTMCNC: 30.5 G/DL (ref 33.6–35)
MCV RBC AUTO: 85.9 FL (ref 81.4–97.8)
MONOCYTES # BLD AUTO: 0.53 K/UL (ref 0–0.85)
MONOCYTES NFR BLD AUTO: 10.2 % (ref 0–13.4)
NEUTROPHILS # BLD AUTO: 3.09 K/UL (ref 2–7.15)
NEUTROPHILS NFR BLD: 59.2 % (ref 44–72)
NRBC # BLD AUTO: 0 K/UL
NRBC BLD-RTO: 0 /100 WBC
PLATELET # BLD AUTO: 348 K/UL (ref 164–446)
PMV BLD AUTO: 9.4 FL (ref 9–12.9)
POTASSIUM SERPL-SCNC: 4 MMOL/L (ref 3.6–5.5)
PROT SERPL-MCNC: 6.6 G/DL (ref 6–8.2)
RBC # BLD AUTO: 4.47 M/UL (ref 4.2–5.4)
SODIUM SERPL-SCNC: 137 MMOL/L (ref 135–145)
WBC # BLD AUTO: 5.2 K/UL (ref 4.8–10.8)

## 2020-06-16 PROCEDURE — G0498 CHEMO EXTEND IV INFUS W/PUMP: HCPCS

## 2020-06-16 PROCEDURE — 96374 THER/PROPH/DIAG INJ IV PUSH: CPT | Mod: XU

## 2020-06-16 PROCEDURE — 96375 TX/PRO/DX INJ NEW DRUG ADDON: CPT

## 2020-06-16 PROCEDURE — 80053 COMPREHEN METABOLIC PANEL: CPT

## 2020-06-16 PROCEDURE — 85025 COMPLETE CBC W/AUTO DIFF WBC: CPT

## 2020-06-16 PROCEDURE — 82378 CARCINOEMBRYONIC ANTIGEN: CPT

## 2020-06-16 PROCEDURE — A4212 NON CORING NEEDLE OR STYLET: HCPCS

## 2020-06-16 PROCEDURE — 700111 HCHG RX REV CODE 636 W/ 250 OVERRIDE (IP): Performed by: NURSE PRACTITIONER

## 2020-06-16 RX ORDER — DEXAMETHASONE SODIUM PHOSPHATE 4 MG/ML
8 INJECTION, SOLUTION INTRA-ARTICULAR; INTRALESIONAL; INTRAMUSCULAR; INTRAVENOUS; SOFT TISSUE ONCE
Status: COMPLETED | OUTPATIENT
Start: 2020-06-16 | End: 2020-06-16

## 2020-06-16 RX ADMIN — FLUOROURACIL 3515 MG: 50 INJECTION, SOLUTION INTRAVENOUS at 10:22

## 2020-06-16 RX ADMIN — DEXAMETHASONE SODIUM PHOSPHATE 8 MG: 4 INJECTION, SOLUTION INTRA-ARTICULAR; INTRALESIONAL; INTRAMUSCULAR; INTRAVENOUS; SOFT TISSUE at 10:10

## 2020-06-16 ASSESSMENT — FIBROSIS 4 INDEX: FIB4 SCORE: 3.95

## 2020-06-16 NOTE — PROGRESS NOTES
Pt presented to [facility] for Cycle 98 of 46 hour Fluorouracil infusion. Pt denied current illness/infection. Stated she had abdominal surgery several weeks ago and that she recently had the staples removed. Port accessed in sterile fashion, flushed easily, paolo briskly. Pre-treatment labs drawn. Results reviewed, within treatable parameters. Pt received pre-med as ordered 46hour Fluorouracil pump connected. Pt left [facility] in NAD. Next appointment confirmed prior to departure.

## 2020-06-16 NOTE — PROGRESS NOTES
"Pharmacy Chemotherapy Verification    Dx: Colon CA      Protocol: 5-FU +      *Dosing Reference*                                    -- 10/31/17: delete Leucovorin and 5-FU push d/t neutropenia per Dr. Hardy   Fluorouracil 2400 mg/m² continuous infusion over 46-48 hours                              -- 20% reduction (1920 mg/m²) to be continued starting C28 per C Alsop APRN   14 day cycles for 12 cycles (adjuvant) or until disease progression or unacceptable toxicity  NCCN Guidelines for Colon Cancer. V.2.2015.  Jay GROVER, et al. Eur J Cancer.1999;35(9):1343-7.     Allergies:  Oxaliplatin; Codeine; Pcn [penicillins]; Sulfa drugs; and Tape     /72   Pulse 96   Temp 36.7 °C (98 °F) (Temporal)   Resp 18   Ht 1.63 m (5' 4.17\")   Wt 74 kg (163 lb 2.3 oz)   LMP  (LMP Unknown)   SpO2 100%   BMI 27.85 kg/m²  Body surface area is 1.83 meters squared.    Labs 6/16/20  ANC~ 3090 Plt = 348k   Hgb = 11.7     SCr = 0.64 mg/dL CrCl ~ 74.7 mL/min (minimum SCr of 0.7)   LFT's = 23/20/206 TBili = 0.4     Drug Order   (Drug name, dose, route, IV Fluid & volume, frequency, number of doses) Cycle 105 ([place] 98)   Previous treatment: 5/5/20     Medication = Fluorouracil (5-FU)  Base Dose = 1920 mg/m²  Calc Dose:Base Dose x 1.83 m² = 3513.6 mg  Final Dose = 3515 mg  Route = CIVI  Fluid & Volume = 70.3 mL (+ 3 mL overfill = 73.3 mL)  Admin Duration = Over 46 hours at 1.5 mL/hour   Via CADD pump       <10% difference, okay to treat with final dose     By my signature below, I confirm this process was performed independently with the BSA and all final chemotherapy dosing calculations congruent. I have reviewed the above chemotherapy order and that my calculation of the final dose and BSA (when applicable) corroborate those calculations of the  pharmacist.     Demetrius Maldonado, PharmD    "

## 2020-06-16 NOTE — PROGRESS NOTES
Chemotherapy Verification - PRIMARY RN      Height = 163 cm  Weight = 74 kg  BSA = 1.83 m2       Medication: Fluorouracil  Dose: 1,920 mg/m2    Calculated Dose: 3,514 mg                             (In mg/m2, AUC, mg/kg)     I confirm this process was performed independently with the BSA and all final chemotherapy dosing calculations congruent.  Any discrepancies of 10% or greater have been addressed with the chemotherapy pharmacist. The resolution of the discrepancy has been documented in the EPIC progress notes.

## 2020-06-16 NOTE — PROGRESS NOTES
Chemotherapy Verification - SECONDARY RN       Height = 64.17 inches  Weight = 74 kg  BSA = 1.83 m2       Medication: Fluorouracil ([place]rucil)  Dose: 1920 mg/m2  Calculated Dose: 3,513.6 mg via CADD pump over 46 hrs                             (In mg/m2, AUC, mg/kg)       I confirm that this process was performed independently.

## 2020-06-18 ENCOUNTER — OUTPATIENT INFUSION SERVICES (OUTPATIENT)
Dept: ONCOLOGY | Facility: MEDICAL CENTER | Age: 80
End: 2020-06-18
Attending: INTERNAL MEDICINE
Payer: MEDICARE

## 2020-06-18 VITALS
OXYGEN SATURATION: 96 % | SYSTOLIC BLOOD PRESSURE: 126 MMHG | DIASTOLIC BLOOD PRESSURE: 62 MMHG | TEMPERATURE: 97.2 F | HEART RATE: 84 BPM | RESPIRATION RATE: 18 BRPM

## 2020-06-18 DIAGNOSIS — C20 MALIGNANT NEOPLASM OF RECTUM (HCC): ICD-10-CM

## 2020-06-18 PROCEDURE — 700111 HCHG RX REV CODE 636 W/ 250 OVERRIDE (IP)

## 2020-06-18 PROCEDURE — 96523 IRRIG DRUG DELIVERY DEVICE: CPT

## 2020-06-18 RX ADMIN — HEPARIN: 100 SYRINGE at 13:43

## 2020-06-18 NOTE — PROGRESS NOTES
Patient arrived to [facility] for C98D3 5FU CADD pump de-access.  Pump stopped with 0ml remaining in reservoir, 71.3 mLs administered.  Disconnected pump, Port flushed per protocol with brisk blood return noted, heparinized, de-accessed and gauze dressing applied.  Pump cleaned and placed in pharmacy drawer.  Confirmed pt's next appt. Pt discharged home in [facility].

## 2020-06-22 DIAGNOSIS — J44.9 CHRONIC OBSTRUCTIVE PULMONARY DISEASE, UNSPECIFIED COPD TYPE (HCC): ICD-10-CM

## 2020-06-22 RX ORDER — TIOTROPIUM BROMIDE AND OLODATEROL 3.124; 2.736 UG/1; UG/1
2 SPRAY, METERED RESPIRATORY (INHALATION) DAILY
Qty: 1 INHALER | Refills: 2 | Status: SHIPPED | OUTPATIENT
Start: 2020-06-22 | End: 2020-07-08 | Stop reason: SDUPTHER

## 2020-06-22 NOTE — TELEPHONE ENCOUNTER
Have we ever prescribed this med? Yes.  If yes, what date? 2/11/2020    Last OV: 7/17/2019 [facility]    Next OV: 9/9/2020 [facility]    DX: COPD    Medications: Stiolto refill

## 2020-06-25 NOTE — PROGRESS NOTES
Subjective:   6/29/2020 12:59 PM  Primary care physician:Manan Quiñonez M.D.  Referring Provider: Eric Hardy M.D.  Medical Oncologist: Eric Hardy M.D    Chief Complaint:   Chief Complaint   Patient presents with   • Follow-Up     PO/ABLATION CT RECAL CA W/LIVER METS      Diagnosis:   1. Malignant neoplasm of rectum (HCC)  BUN (for Contrast)   2. Secondary malignant neoplasm of liver (HCC)  BUN (for Contrast)   3. Liver disease  CT-ABDOMEN LIVER FOR HEPATIC MASS (CIRRHOSIS)    BUN (for Contrast)       History of presenting illness:  Karlene Walters  is a pleasant 80 y.o. year old female who presented with follow-up status post ablation of the majority right lobe during open procedure because we could not resected due to involvement of the tumor near the bifurcation.  In any event, the patient is doing well.  She denies any fever or chills, nausea or vomiting.  CT scan 1 month post ablation shows no sign of arterial enhancement and no tremors and no new tumors.  She is very comfortable.  Her pain is well controlled.  She has already started chemotherapy.      Past Medical History:   Diagnosis Date   • Adverse effect of anesthesia     elevated BP postop   • Anesthesia    • Arrhythmia     occasional   • Blood clotting disorder (HCC) 08/2015    bilat PE    • Blood transfusion 1957   • Breath shortness     pt reports d/t chemo treatment; no O2; with moderate exertion; no c/o at this time   • Cancer (HCC) 09/2012    rectal cancer,  Liver CA-2015   • CATARACT     removed b/l   • Chemotherapy-induced neutropenia (HCC) 9/28/2016   • Chickenpox     history of   • Colon cancer (HCC)    • Dental disorder     dentures UPPERS AND LOWERS   • Elevated alkaline phosphatase level 11/15/2017   • Emphysema of lung (HCC)     COPD   • Fall    • [place] measles     history of   • Heart murmur     as a child   • Hemorrhagic disorder (HCC)     on Xarelto    • High cholesterol    • Hypercholesteremia    • Hypertension     no meds  currently    • Ileostomy in place (HCC)    • Ileostomy in place (HCC)    • Indigestion    • Influenza     history of   • Lightheadedness 9/17/2015   • Mumps     history of   • Obstruction     ileostomy   • Other specified symptom associated with female genital organs     HYSTERECTOMY 1993   • Pneumonia 05/2017    tx'd with antibx   • Pulmonary embolism (HCC)    • 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)    • Renal insufficiency 3/14/2016   • Restless legs 5/6/2020   • Routine general medical examination at a health care facility 3/14/2016    3/14/16   • Seasonal allergies    • Swelling of both ankles     Lasix PRN   • Tonsillitis      Past Surgical History:   Procedure Laterality Date   • HEPATIC ABLATION  5/28/2020    Procedure: ABLATION, LESION, LIVER-TRISEGMENTECTOMY, MICROWAVE ABLATION, INTRA OPERATIVE ULTRA SOUND, SIMPLE RESECTED / REPAIR OF DIAPHRAGM;  Surgeon: Kit West M.D.;  Location: SURGERY [facility];  Service: General   • HEPATIC ABLATION LAPAROSCOPIC  11/15/2018    Procedure: HEPATIC ABLATION LAPAROSCOPIC.- SEGMENT 7/8;  Surgeon: Kit West M.D.;  Location: SURGERY [facility];  Service: General   • COLONOSCOPY WITH BIOPSY  8/23/2015    Procedure: COLONOSCOPY WITH BIOPSY;  Surgeon: Wilbur Glasgow M.D.;  Location: ENDOSCOPY [facility];  Service:    • HEPATIC ABLATION LAPAROSCOPIC  7/6/2015    Procedure: HEPATIC ABLATION LAPAROSCOPIC.;  Surgeon: Kit West M.D.;  Location: SURGERY [facility];  Service:    • CATH PLACEMENT Left 7/6/2015    Procedure: CATH PLACEMENT CEPHALIC POWERPORT;  Surgeon: Kit West M.D.;  Location: SURGERY [facility];  Service:    • FISTULA IN ANO REPAIR  1/28/2015    Performed by Kolton Montgomery M.D. at [facility]   • PERINEAL PROCEDURE  1/28/2015    Performed by Kolton Montgomery M.D. at [facility]   • FISTULA IN ANO REPAIR  10/15/2014    Performed by Kolton Montgomery M.D.  at SURGERY [facility]   • PERINEAL PROCEDURE  10/15/2014    Performed by Kolton Montgomery M.D. at SURGERY [facility]   • IRRIGATION & DEBRIDEMENT GENERAL  2/19/2014    Performed by Kolton Montgomery M.D. at [facility]   • FLAP GRAFT  2/19/2014    Performed by Kolton Montgomery M.D. at [facility]   • PERINEAL PROCEDURE  12/18/2013    Performed by Kolton Montgomery M.D. at [facility]   • MYOCUTANEOUS FLAP  12/18/2013    Performed by Kolton Montgomery M.D. at [facility]   • IRRIGATION & DEBRIDEMENT GENERAL  10/23/2013    Performed by Kolton Montgomery M.D. at [facility]   • FISTULA IN ANO REPAIR  9/4/2013    Performed by Kolton Montgomery M.D. at [facility]   • FLAP ROTATION  9/4/2013    Performed by Kolton Montgomery M.D. at [facility]   • RECOVERY  8/26/2013    Performed by [facility]Rec[facility] Surgery at [facility] SAME DAY [facility]   • BIOPSY GENERAL  7/17/2013    Performed by Kolton Montgomery M.D. at SURGERY [facility]   • PROCTOSCOPY  7/17/2013    Performed by Kolton Montgomery M.D. at [facility]   • FISTULA IN ANO REPAIR  2/27/2013    Performed by Kolton Montgomery M.D. at SURGERY [facility]   • SIGMOIDOSCOPY  2/27/2013    Performed by Kolton Montgomery M.D. at [facility]   • LAPAROSCOPY  10/8/2012    Performed by Kolton Montgomery M.D. at [facility]   • ILEO LOOP DIVERSION  10/8/2012    Performed by Kolton Montgomery M.D. at [facility]   • COLECTOMY  9/26/2012    Performed by Kolton Montgomery M.D. at [facility]   • COLONOSCOPY FLEX W/ENDO US  9/20/2012    Performed by Harshil Bolton M.D. at [facility]   • OTHER  2009    left eye torn retina repair   • CATARACT EXTRACTION WITH IOL  2004    bilateral   • ABDOMINAL HYSTERECTOMY TOTAL  1983   • CYST EXCISION  1972    ovarian   • COLON RESECTION     • EYE SURGERY      cataracts   • HYSTERECTOMY LAPAROSCOPY     • PB REMV 2ND  "CATARACT,CORN-SCLER SECTN     • TONSILLECTOMY       Allergies   Allergen Reactions   • Oxaliplatin Anaphylaxis   • Codeine      \"gets drunk\"   • Pcn [Penicillins] Itching     itching   • Sulfa Drugs Itching     itching   • Tape Rash     PAPER TAPE OK     Outpatient Encounter Medications as of 6/29/2020   Medication Sig Dispense Refill   • Tiotropium Bromide-Olodaterol (STIOLTO RESPIMAT) 2.5-2.5 MCG/ACT Aero Soln Take 2 Puffs by mouth every day. 1 Inhaler 2   • potassium chloride ER (KLOR-CON) 10 MEQ tablet Take 1 Tab by mouth every day. 100 Tab 3   • rivaroxaban (XARELTO) 10 MG Tab tablet Take 1 Tab by mouth every day. 90 Tab 1   • lidocaine-prilocaine (EMLA) 2.5-2.5 % Cream APPLY A THICK FILM OVER THE PORT ACCESS SITE PRIOR TO ACCESS 30 g 3   • ondansetron (ZOFRAN) 4 MG Tab tablet Take 1 Tab by mouth as needed. 30 Tab 3   • metronidazole (METROCREAM) 0.75 % cream Apply 1 Each to affected area(s) 2 times a day.     • cyanocobalamin (VITAMIN B-12) 500 MCG Tab Take 1,000 mcg by mouth every day.     • Multiple Vitamins-Minerals (CENTRUM SILVER PO) Take 1 Tab by mouth every day.     • Cholecalciferol (VITAMIN D3) 400 UNITS CAPS Take 1 Cap by mouth every day.     • loratadine (CLARITIN) 10 MG TABS Take 10 mg by mouth every day. Indications: Hayfever       Facility-Administered Encounter Medications as of 6/29/2020   Medication Dose Route Frequency Provider Last Rate Last Dose   • heparin lock flush 100 unit/mL injection 500 Units  500 Units Intracatheter PRN Le Ryder M.D.   500 Units at 06/29/20 1040     Social History     Socioeconomic History   • Marital status:      Spouse name: Not on file   • Number of children: Not on file   • Years of education: Not on file   • Highest education level: Not on file   Occupational History   • Not on file   Social Needs   • Financial resource strain: Not hard at all   • Food insecurity     Worry: Never true     Inability: Never true   • Transportation needs     Medical: " No     Non-medical: No   Tobacco Use   • Smoking status: Never Smoker   • Smokeless tobacco: Never Used   Substance and Sexual Activity   • Alcohol use: No   • Drug use: No   • Sexual activity: Never     Partners: Male     Birth control/protection: Post-Menopausal   Lifestyle   • Physical activity     Days per week: Not on file     Minutes per session: Not on file   • Stress: Not on file   Relationships   • Social connections     Talks on phone: Not on file     Gets together: Not on file     Attends Restorationist service: Not on file     Active member of club or organization: Not on file     Attends meetings of clubs or organizations: Not on file     Relationship status: Not on file   • Intimate partner violence     Fear of current or ex partner: Not on file     Emotionally abused: Not on file     Physically abused: Not on file     Forced sexual activity: Not on file   Other Topics Concern   • Primary/coprimary nurse & associates Not Asked   • Family contact information Not Asked   • OK to release patient information to the following Not Asked   • Patient preferred routine/Privacy concerns Not Asked   • Patient likes and dislikes Not Asked   • Participating In Research Study Not Asked   • Miscellaneous Not Asked   Social History Narrative   • Not on file      Social History     Tobacco Use   Smoking Status Never Smoker   Smokeless Tobacco Never Used     Social History     Substance and Sexual Activity   Alcohol Use No     Social History     Substance and Sexual Activity   Drug Use No        Family History   Problem Relation Age of Onset   • Heart Disease Mother    • Heart Failure Mother    • Hypertension Mother    • Hyperlipidemia Mother    • Cancer Mother    • Cancer Maternal Aunt 60        breast ca   • Lung Disease Brother         COPD/Emphysema/Smoker   • Cancer Brother         prostate cancer   • Alcohol/Drug Brother         Alcohol   • Kidney Disease Father         renal failure       Review of Systems  "  Respiratory: Positive for shortness of breath.    All other systems reviewed and are negative.       Objective:   /78 (BP Location: Right arm, Patient Position: Sitting, BP Cuff Size: Adult)   Pulse (!) 101   Temp 36.1 °C (97 °F) (Temporal)   Ht 1.651 m (5' 5\")   Wt 73 kg (161 lb)   LMP  (LMP Unknown)   SpO2 97%   BMI 26.79 kg/m²     Physical Exam   Abdominal: Soft. Bowel sounds are normal. There is abdominal tenderness.   Mild tenderness at the incision   Nursing note and vitals reviewed.      Labs:  Results for KANG HALL (MRN [id number]) as of 6/25/2020 14:39   Ref. Range 6/16/2020 08:41   WBC Latest Ref Range: 4.8 - 10.8 K/uL 5.2   RBC Latest Ref Range: 4.20 - 5.40 M/uL 4.47   Hemoglobin Latest Ref Range: 12.0 - 16.0 g/dL 11.7 (L)   Hematocrit Latest Ref Range: 37.0 - 47.0 % 38.4   MCV Latest Ref Range: 81.4 - 97.8 fL 85.9   MCH Latest Ref Range: 27.0 - 33.0 pg 26.2 (L)   MCHC Latest Ref Range: 33.6 - 35.0 g/dL 30.5 (L)   RDW Latest Ref Range: 35.9 - 50.0 fL 60.2 (H)   Platelet Count Latest Ref Range: 164 - 446 K/uL 348   MPV Latest Ref Range: 9.0 - 12.9 fL 9.4   Neutrophils-Polys Latest Ref Range: 44.00 - 72.00 % 59.20   Neutrophils (Absolute) Latest Ref Range: 2.00 - 7.15 K/uL 3.09   Lymphocytes Latest Ref Range: 22.00 - 41.00 % 22.00   Lymphs (Absolute) Latest Ref Range: 1.00 - 4.80 K/uL 1.15   Monocytes Latest Ref Range: 0.00 - 13.40 % 10.20   Monos (Absolute) Latest Ref Range: 0.00 - 0.85 K/uL 0.53   Eosinophils Latest Ref Range: 0.00 - 6.90 % 6.90   Eos (Absolute) Latest Ref Range: 0.00 - 0.51 K/uL 0.36   Basophils Latest Ref Range: 0.00 - 1.80 % 1.30   Baso (Absolute) Latest Ref Range: 0.00 - 0.12 K/uL 0.07   Immature Granulocytes Latest Ref Range: 0.00 - 0.90 % 0.40   Immature Granulocytes (abs) Latest Ref Range: 0.00 - 0.11 K/uL 0.02   Nucleated RBC Latest Units: /100 WBC 0.00   NRBC (Absolute) Latest Units: K/uL 0.00   Sodium Latest Ref Range: 135 - 145 mmol/L 137   Potassium " Latest Ref Range: 3.6 - 5.5 mmol/L 4.0   Chloride Latest Ref Range: 96 - 112 mmol/L 104   Co2 Latest Ref Range: 20 - 33 mmol/L 22   Anion Gap Latest Ref Range: 7.0 - 16.0  11.0   Glucose Latest Ref Range: 65 - 99 mg/dL 90   Bun Latest Ref Range: 8 - 22 mg/dL 11   Creatinine Latest Ref Range: 0.50 - 1.40 mg/dL 0.64   GFR If  Latest Ref Range: >60 mL/min/1.73 m 2 >60   GFR If Non  Latest Ref Range: >60 mL/min/1.73 m 2 >60   Calcium Latest Ref Range: 8.5 - 10.5 mg/dL 9.3   AST(SGOT) Latest Ref Range: 12 - 45 U/L 23   ALT(SGPT) Latest Ref Range: 2 - 50 U/L 20   Alkaline Phosphatase Latest Ref Range: 30 - 99 U/L 206 (H)   Total Bilirubin Latest Ref Range: 0.1 - 1.5 mg/dL 0.4   Albumin Latest Ref Range: 3.2 - 4.9 g/dL 3.4   Total Protein Latest Ref Range: 6.0 - 8.2 g/dL 6.6   Globulin Latest Ref Range: 1.9 - 3.5 g/dL 3.2   A-G Ratio Latest Units: g/dL 1.1   Carcinoembryonic Antigen Latest Ref Range: 0.0 - 3.0 ng/mL 0.9       Imagin20 CT     Per my read,          Pathology:  FINAL DIAGNOSIS:     A. Falciform ligament:          Portion of benign fibrovascular adipose tissue.          No malignancy identified.      Procedures: 20     1. Exploratory laparotomy.  2. Open microwave thermal ablation x10 involving 2 separate lesions in the   right lobe and medial segment of the left lobe.  This was done with 6   overlapping 100 watt at 2-minute ablation giving us 6 overlapping 3.2x4.0 cm   ablation cavities.  The second ablation was essentially right at the origin of   the right portal pedicle approximately 2 cm away from the left portal   pedicle, which most of her liver function is from that left lobe.  She went   over a single ablation of 100 watt at 2 minutes giving us a 3x3.4 cm ablation   of a 2 cm tumor.  The rest of her liver was clean.  We were confident that we   were away enough from the hilum and the left portal pedicle, but we were   unable to do the right lobe resection  due to involvement of the diaphragm,   which we had to resect and repair as well as the main right portal pedicle   near the hilum, we felt that we were going to risk injuring her left lobe   pedicle.  3. Resection and simple repair of right hemidiaphragm.          Diagnosis:     1. Malignant neoplasm of rectum (HCC)  BUN (for Contrast)   2. Secondary malignant neoplasm of liver (HCC)  BUN (for Contrast)   3. Liver disease  CT-ABDOMEN LIVER FOR HEPATIC MASS (CIRRHOSIS)    BUN (for Contrast)           Medical Decision Making:  Today's Assessment / Status / Plan:     In light of the present findings, the patient be scheduled for a repeat liver CT in 3 months for surveillance.  We will see her at that time.  Patient has already started her chemotherapy.    I, Dr. West have entered, reviewed and confirmed the above diagnoses related to this patient on this date of service, 6/29/2020 12:59 PM.    She agreed and verbalized her agreement and understanding with the current plan. I answered all questions and concerns she has at this time and advised her to call at any time in the interim with questions or concerns in regards to her care.    Thank you for allowing me to participate in her care, I will continue to follow closely.       Please note that this dictation was created using voice recognition software. I have made every reasonable attempt to correct obvious errors, but I expect that there are errors of grammar and possibly content that I did not discover before finalizing the note.     Thank you for this consultation and allowing me to participate in your patient's care. If I can be of further service please contact my office.

## 2020-06-26 NOTE — PROGRESS NOTES
"06/29/20    Subjective    Chief Complaint:  Rectal cancer metastatic to liver and lung    HPI:  See note of 6/15/20 and multiple prior notes. 80 female from [place] initially diagnosed in 2012 with liver and lung metastases diagnosed in 2015.   She had liver ablation back then, followed by Xelox with allergic reaction to oxaliplatin followed by single agent 5FU q.o.w for years. We spread it out to q3 and then q4 weeks followed by liver recurrence which was unresectable and therefore ablated by Dr. West. She started back on 5FU 6/16/20. CT from this am shows the ablation effects but not obvious new cancer. She feels well.     ROS:    Constitutional: No weight loss  Skin: No rash or jaundice  HENT: No change in eyesight or hearing  Cardiovascular:No chest pain or arrythmia  Respiratory:No cough or SOB  GI:No nausea, vomiting, diarrhea, constipation. Has a colostomy.   :No dysuria or frequency  Musculoskeletal:No bone or joint pain  Neuro:No sx's of neuropathy  Psych: No complaints    PMH:      Allergies   Allergen Reactions   • Oxaliplatin Anaphylaxis   • Codeine      \"gets drunk\"   • Pcn [Penicillins] Itching     itching   • Sulfa Drugs Itching     itching   • Tape Rash     PAPER TAPE OK       Past Medical History:   Diagnosis Date   • Adverse effect of anesthesia     elevated BP postop   • Anesthesia    • Arrhythmia     occasional   • Blood clotting disorder (HCC) 08/2015    bilat PE    • Blood transfusion 1957   • Breath shortness     pt reports d/t chemo treatment; no O2; with moderate exertion; no c/o at this time   • Cancer (HCC) 09/2012    rectal cancer,  Liver CA-2015   • CATARACT     removed b/l   • Chemotherapy-induced neutropenia (HCC) 9/28/2016   • Chickenpox     history of   • Colon cancer (HCC)    • Dental disorder     dentures UPPERS AND LOWERS   • Elevated alkaline phosphatase level 11/15/2017   • Emphysema of lung (HCC)     COPD   • Fall    • Kinyarwanda measles     history of   • Heart murmur "     as a child   • Hemorrhagic disorder (HCC)     on Xarelto    • High cholesterol    • Hypercholesteremia    • Hypertension     no meds currently    • Ileostomy in place (HCC)    • Ileostomy in place (HCC)    • Indigestion    • Influenza     history of   • Lightheadedness 9/17/2015   • Mumps     history of   • Obstruction     ileostomy   • Other specified symptom associated with female genital organs     HYSTERECTOMY 1993   • Pneumonia 05/2017    tx'd with antibx   • Pulmonary embolism (HCC)    • 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)    • Renal insufficiency 3/14/2016   • Restless legs 5/6/2020   • Routine general medical examination at a health care facility 3/14/2016    3/14/16   • Seasonal allergies    • Swelling of both ankles     Lasix PRN   • Tonsillitis         Past Surgical History:   Procedure Laterality Date   • HEPATIC ABLATION  5/28/2020    Procedure: ABLATION, LESION, LIVER-TRISEGMENTECTOMY, MICROWAVE ABLATION, INTRA OPERATIVE ULTRA SOUND, SIMPLE RESECTED / REPAIR OF DIAPHRAGM;  Surgeon: Kit West M.D.;  Location: SURGERY [facility];  Service: General   • HEPATIC ABLATION LAPAROSCOPIC  11/15/2018    Procedure: HEPATIC ABLATION LAPAROSCOPIC.- SEGMENT 7/8;  Surgeon: Kit West M.D.;  Location: SURGERY [facility];  Service: General   • COLONOSCOPY WITH BIOPSY  8/23/2015    Procedure: COLONOSCOPY WITH BIOPSY;  Surgeon: Wilbur Glasgow M.D.;  Location: ENDOSCOPY [facility];  Service:    • HEPATIC ABLATION LAPAROSCOPIC  7/6/2015    Procedure: HEPATIC ABLATION LAPAROSCOPIC.;  Surgeon: Kit West M.D.;  Location: SURGERY [facility];  Service:    • CATH PLACEMENT Left 7/6/2015    Procedure: CATH PLACEMENT CEPHALIC POWERPORT;  Surgeon: Kit West M.D.;  Location: SURGERY [facility];  Service:    • FISTULA IN ANO REPAIR  1/28/2015    Performed by Kolton Montgomery M.D. at [facility]   • PERINEAL PROCEDURE   1/28/2015    Performed by Kolton Montgomery M.D. at SURGERY [facility]   • FISTULA IN ANO REPAIR  10/15/2014    Performed by Kolton Montgomery M.D. at SURGERY [facility]   • PERINEAL PROCEDURE  10/15/2014    Performed by Kolton Montgomery M.D. at [facility]   • IRRIGATION & DEBRIDEMENT GENERAL  2/19/2014    Performed by Kolton Montgomery M.D. at SURGERY [facility]   • FLAP GRAFT  2/19/2014    Performed by Kolton Montgomery M.D. at [facility]   • PERINEAL PROCEDURE  12/18/2013    Performed by Kolton Montgomery M.D. at SURGERY [facility]   • MYOCUTANEOUS FLAP  12/18/2013    Performed by Kolton Montgomery M.D. at [facility]   • IRRIGATION & DEBRIDEMENT GENERAL  10/23/2013    Performed by Kolton Montgomery M.D. at SURGERY [facility]   • FISTULA IN ANO REPAIR  9/4/2013    Performed by Kolton Montgomery M.D. at [facility]   • FLAP ROTATION  9/4/2013    Performed by Kolton Montgomery M.D. at [facility]   • RECOVERY  8/26/2013    Performed by [facility] [place] SAME DAY [facility]   • BIOPSY GENERAL  7/17/2013    Performed by Kolton Montgomery M.D. at [facility]   • PROCTOSCOPY  7/17/2013    Performed by Kolton Montgomery M.D. at SURGERY [facility]   • FISTULA IN ANO REPAIR  2/27/2013    Performed by Kolton Montgomery M.D. at [facility]   • SIGMOIDOSCOPY  2/27/2013    Performed by Kolton Montgomery M.D. at [facility]   • LAPAROSCOPY  10/8/2012    Performed by Kolton Montgomery M.D. at [facility]   • ILEO LOOP DIVERSION  10/8/2012    Performed by Kolton Montgomery M.D. at [facility]   • COLECTOMY  9/26/2012    Performed by Kolton Montgomery M.D. at [facility]   • COLONOSCOPY FLEX W/ENDO US  9/20/2012    Performed by Harshil Bolton M.D. at SURGERY [facility]   • OTHER  2009    left eye torn retina repair   • CATARACT EXTRACTION WITH IOL  2004    bilateral   • ABDOMINAL HYSTERECTOMY TOTAL  1983   •  CYST EXCISION  1972    ovarian   • COLON RESECTION     • EYE SURGERY      cataracts   • HYSTERECTOMY LAPAROSCOPY     • PB REMV 2ND CATARACT,CORN-SCLER SECTN     • TONSILLECTOMY          Medications:    Current Outpatient Medications on File Prior to Visit   Medication Sig Dispense Refill   • albuterol 108 (90 Base) MCG/ACT Aero Soln inhalation aerosol Inhale 2 Puffs by mouth every 6 hours as needed for Shortness of Breath.     • Tiotropium Bromide-Olodaterol (STIOLTO RESPIMAT) 2.5-2.5 MCG/ACT Aero Soln Take 2 Puffs by mouth every day. 1 Inhaler 2   • potassium chloride ER (KLOR-CON) 10 MEQ tablet Take 1 Tab by mouth every day. 100 Tab 3   • rivaroxaban (XARELTO) 10 MG Tab tablet Take 1 Tab by mouth every day. (Patient taking differently: Take 10 mg by mouth every day at 6 PM.) 90 Tab 1   • lidocaine-prilocaine (EMLA) 2.5-2.5 % Cream APPLY A THICK FILM OVER THE PORT ACCESS SITE PRIOR TO ACCESS 30 g 3   • ondansetron (ZOFRAN) 4 MG Tab tablet Take 1 Tab by mouth as needed. 30 Tab 3   • metronidazole (METROCREAM) 0.75 % cream Apply 1 Each to affected area(s) 2 times a day.     • cyanocobalamin (VITAMIN B-12) 500 MCG Tab Take 1,000 mcg by mouth every day.     • Multiple Vitamins-Minerals (CENTRUM SILVER PO) Take 1 Tab by mouth every day.     • Cholecalciferol (VITAMIN D3) 400 UNITS CAPS Take 1 Cap by mouth every day.     • loratadine (CLARITIN) 10 MG TABS Take 10 mg by mouth every day. Indications: Hayfever       Current Facility-Administered Medications on File Prior to Visit   Medication Dose Route Frequency Provider Last Rate Last Dose   • heparin lock flush 100 unit/mL injection 500 Units  500 Units Intracatheter PRN Le Ryder M.D.   500 Units at 06/29/20 1040       Social History     Tobacco Use   • Smoking status: Never Smoker   • Smokeless tobacco: Never Used   Substance Use Topics   • Alcohol use: No        Family History   Problem Relation Age of Onset   • Heart Disease Mother    • Heart Failure Mother   "  • Hypertension Mother    • Hyperlipidemia Mother    • Cancer Mother    • Cancer Maternal Aunt 60        breast ca   • Lung Disease Brother         COPD/Emphysema/Smoker   • Cancer Brother         prostate cancer   • Alcohol/Drug Brother         Alcohol   • Kidney Disease Father         renal failure        Objective    Vitals:    /72 (BP Location: Right arm, Patient Position: Sitting, BP Cuff Size: Adult)   Pulse 82   Temp 37.1 °C (98.7 °F) (Temporal)   Resp 16   Ht 1.655 m (5' 5.16\")   Wt 73.9 kg (162 lb 13 oz)   LMP  (LMP Unknown)   SpO2 96%   BMI 26.96 kg/m²     Physical Exam:    Appears well-developed and well-nourished. No distress.    Psychiatric -  Normal mood and affect.    Labs:  Results for KANG HALL (MRN [id number])    Ref. Range 4/7/2020 08:20 5/5/2020 07:20 5/25/2020 09:00 6/16/2020 08:41   Carcinoembryonic Antigen Latest Ref Range: 0.0 - 3.0 ng/mL 1.7 1.5  0.9   Results for KANG HALL (MRN [id number])    Ref. Range 6/29/2020 08:52   WBC Latest Ref Range: 4.8 - 10.8 K/uL 5.3   RBC Latest Ref Range: 4.20 - 5.40 M/uL 4.61   Hemoglobin Latest Ref Range: 12.0 - 16.0 g/dL 12.3   Hematocrit Latest Ref Range: 37.0 - 47.0 % 38.9   MCV Latest Ref Range: 81.4 - 97.8 fL 84.4   MCH Latest Ref Range: 27.0 - 33.0 pg 26.7 (L)   MCHC Latest Ref Range: 33.6 - 35.0 g/dL 31.6 (L)   RDW Latest Ref Range: 35.9 - 50.0 fL 59.2 (H)   Platelet Count Latest Ref Range: 164 - 446 K/uL 225   MPV Latest Ref Range: 9.0 - 12.9 fL 10.3   Neutrophils-Polys Latest Ref Range: 44.00 - 72.00 % 59.80   Neutrophils (Absolute) Latest Ref Range: 2.00 - 7.15 K/uL 3.14   Lymphocytes Latest Ref Range: 22.00 - 41.00 % 23.60   Results for KANG HALL (MRN [id number])    Ref. Range 6/29/2020 08:52   Sodium Latest Ref Range: 135 - 145 mmol/L 140   Potassium Latest Ref Range: 3.6 - 5.5 mmol/L 3.8   Chloride Latest Ref Range: 96 - 112 mmol/L 106   Co2 Latest Ref Range: 20 - 33 mmol/L 21   Anion Gap Latest Ref Range: 7.0 " - 16.0  13.0   Glucose Latest Ref Range: 65 - 99 mg/dL 103 (H)   Bun Latest Ref Range: 8 - 22 mg/dL 15   Creatinine Latest Ref Range: 0.50 - 1.40 mg/dL 0.81   GFR If  Latest Ref Range: >60 mL/min/1.73 m 2 >60   GFR If Non  Latest Ref Range: >60 mL/min/1.73 m 2 >60   Calcium Latest Ref Range: 8.5 - 10.5 mg/dL 9.5   AST(SGOT) Latest Ref Range: 12 - 45 U/L 23   ALT(SGPT) Latest Ref Range: 2 - 50 U/L 20   Alkaline Phosphatase Latest Ref Range: 30 - 99 U/L 159 (H)   Total Bilirubin Latest Ref Range: 0.1 - 1.5 mg/dL 0.6   Albumin Latest Ref Range: 3.2 - 4.9 g/dL 3.7   Total Protein Latest Ref Range: 6.0 - 8.2 g/dL 6.6   Globulin Latest Ref Range: 1.9 - 3.5 g/dL 2.9   Results for KANG HALL (MRN [id number]) as of 6/29/2020    Ref. Range 6/29/2020 08:52   Carcinoembryonic Antigen Latest Ref Range: 0.0 - 3.0 ng/mL 1.4       Assessment    Imp:    Visit Diagnosis:    1. Malignant neoplasm of rectum (HCC)     2. Secondary malignant neoplasm of liver (HCC)           Plan:  Back to q. 2 week 5 FU  CT is 3 months - she says Dr. ROJAS will order the scans    Lucho Steele M.D.

## 2020-06-29 ENCOUNTER — OFFICE VISIT (OUTPATIENT)
Dept: SURGICAL ONCOLOGY | Facility: MEDICAL CENTER | Age: 80
End: 2020-06-29
Payer: MEDICARE

## 2020-06-29 ENCOUNTER — HOSPITAL ENCOUNTER (OUTPATIENT)
Dept: RADIOLOGY | Facility: MEDICAL CENTER | Age: 80
End: 2020-06-29
Attending: SURGERY
Payer: MEDICARE

## 2020-06-29 ENCOUNTER — OUTPATIENT INFUSION SERVICES (OUTPATIENT)
Dept: ONCOLOGY | Facility: MEDICAL CENTER | Age: 80
End: 2020-06-29
Attending: INTERNAL MEDICINE
Payer: MEDICARE

## 2020-06-29 ENCOUNTER — OFFICE VISIT (OUTPATIENT)
Dept: HEMATOLOGY ONCOLOGY | Facility: MEDICAL CENTER | Age: 80
End: 2020-06-29
Payer: MEDICARE

## 2020-06-29 VITALS
BODY MASS INDEX: 26.82 KG/M2 | HEIGHT: 65 IN | OXYGEN SATURATION: 97 % | HEART RATE: 101 BPM | TEMPERATURE: 97 F | SYSTOLIC BLOOD PRESSURE: 124 MMHG | WEIGHT: 161 LBS | DIASTOLIC BLOOD PRESSURE: 78 MMHG

## 2020-06-29 VITALS
WEIGHT: 162.81 LBS | RESPIRATION RATE: 16 BRPM | SYSTOLIC BLOOD PRESSURE: 132 MMHG | OXYGEN SATURATION: 96 % | BODY MASS INDEX: 27.13 KG/M2 | HEIGHT: 65 IN | HEART RATE: 82 BPM | TEMPERATURE: 98.7 F | DIASTOLIC BLOOD PRESSURE: 72 MMHG

## 2020-06-29 VITALS
RESPIRATION RATE: 18 BRPM | BODY MASS INDEX: 27.63 KG/M2 | HEIGHT: 64 IN | TEMPERATURE: 97.6 F | DIASTOLIC BLOOD PRESSURE: 73 MMHG | WEIGHT: 161.82 LBS | SYSTOLIC BLOOD PRESSURE: 151 MMHG | OXYGEN SATURATION: 97 % | HEART RATE: 99 BPM

## 2020-06-29 DIAGNOSIS — C78.7 SECONDARY MALIGNANT NEOPLASM OF LIVER (HCC): Chronic | ICD-10-CM

## 2020-06-29 DIAGNOSIS — C78.7 SECONDARY MALIGNANT NEOPLASM OF LIVER (HCC): ICD-10-CM

## 2020-06-29 DIAGNOSIS — C20 MALIGNANT NEOPLASM OF RECTUM (HCC): ICD-10-CM

## 2020-06-29 DIAGNOSIS — C20 MALIGNANT NEOPLASM OF RECTUM (HCC): Chronic | ICD-10-CM

## 2020-06-29 DIAGNOSIS — K76.9 LIVER DISEASE: ICD-10-CM

## 2020-06-29 LAB
ALBUMIN SERPL BCP-MCNC: 3.7 G/DL (ref 3.2–4.9)
ALBUMIN/GLOB SERPL: 1.3 G/DL
ALP SERPL-CCNC: 159 U/L (ref 30–99)
ALT SERPL-CCNC: 20 U/L (ref 2–50)
ANION GAP SERPL CALC-SCNC: 13 MMOL/L (ref 7–16)
AST SERPL-CCNC: 23 U/L (ref 12–45)
BASOPHILS # BLD AUTO: 1 % (ref 0–1.8)
BASOPHILS # BLD: 0.05 K/UL (ref 0–0.12)
BILIRUB SERPL-MCNC: 0.6 MG/DL (ref 0.1–1.5)
BUN SERPL-MCNC: 15 MG/DL (ref 8–22)
CALCIUM SERPL-MCNC: 9.5 MG/DL (ref 8.5–10.5)
CEA SERPL-MCNC: 1.4 NG/ML (ref 0–3)
CHLORIDE SERPL-SCNC: 106 MMOL/L (ref 96–112)
CO2 SERPL-SCNC: 21 MMOL/L (ref 20–33)
CREAT SERPL-MCNC: 0.81 MG/DL (ref 0.5–1.4)
EOSINOPHIL # BLD AUTO: 0.28 K/UL (ref 0–0.51)
EOSINOPHIL NFR BLD: 5.3 % (ref 0–6.9)
ERYTHROCYTE [DISTWIDTH] IN BLOOD BY AUTOMATED COUNT: 59.2 FL (ref 35.9–50)
GLOBULIN SER CALC-MCNC: 2.9 G/DL (ref 1.9–3.5)
GLUCOSE SERPL-MCNC: 103 MG/DL (ref 65–99)
HCT VFR BLD AUTO: 38.9 % (ref 37–47)
HGB BLD-MCNC: 12.3 G/DL (ref 12–16)
IMM GRANULOCYTES # BLD AUTO: 0.02 K/UL (ref 0–0.11)
IMM GRANULOCYTES NFR BLD AUTO: 0.4 % (ref 0–0.9)
LYMPHOCYTES # BLD AUTO: 1.24 K/UL (ref 1–4.8)
LYMPHOCYTES NFR BLD: 23.6 % (ref 22–41)
MCH RBC QN AUTO: 26.7 PG (ref 27–33)
MCHC RBC AUTO-ENTMCNC: 31.6 G/DL (ref 33.6–35)
MCV RBC AUTO: 84.4 FL (ref 81.4–97.8)
MONOCYTES # BLD AUTO: 0.52 K/UL (ref 0–0.85)
MONOCYTES NFR BLD AUTO: 9.9 % (ref 0–13.4)
NEUTROPHILS # BLD AUTO: 3.14 K/UL (ref 2–7.15)
NEUTROPHILS NFR BLD: 59.8 % (ref 44–72)
NRBC # BLD AUTO: 0 K/UL
NRBC BLD-RTO: 0 /100 WBC
PLATELET # BLD AUTO: 225 K/UL (ref 164–446)
PMV BLD AUTO: 10.3 FL (ref 9–12.9)
POTASSIUM SERPL-SCNC: 3.8 MMOL/L (ref 3.6–5.5)
PROT SERPL-MCNC: 6.6 G/DL (ref 6–8.2)
RBC # BLD AUTO: 4.61 M/UL (ref 4.2–5.4)
SODIUM SERPL-SCNC: 140 MMOL/L (ref 135–145)
WBC # BLD AUTO: 5.3 K/UL (ref 4.8–10.8)

## 2020-06-29 PROCEDURE — 99212 OFFICE O/P EST SF 10 MIN: CPT | Performed by: INTERNAL MEDICINE

## 2020-06-29 PROCEDURE — 700111 HCHG RX REV CODE 636 W/ 250 OVERRIDE (IP)

## 2020-06-29 PROCEDURE — 80053 COMPREHEN METABOLIC PANEL: CPT

## 2020-06-29 PROCEDURE — 700111 HCHG RX REV CODE 636 W/ 250 OVERRIDE (IP): Performed by: RADIOLOGY

## 2020-06-29 PROCEDURE — A4212 NON CORING NEEDLE OR STYLET: HCPCS

## 2020-06-29 PROCEDURE — 82378 CARCINOEMBRYONIC ANTIGEN: CPT

## 2020-06-29 PROCEDURE — 85025 COMPLETE CBC W/AUTO DIFF WBC: CPT

## 2020-06-29 PROCEDURE — 36591 DRAW BLOOD OFF VENOUS DEVICE: CPT

## 2020-06-29 PROCEDURE — 99024 POSTOP FOLLOW-UP VISIT: CPT | Performed by: SURGERY

## 2020-06-29 PROCEDURE — 700117 HCHG RX CONTRAST REV CODE 255: Performed by: SURGERY

## 2020-06-29 PROCEDURE — 74160 CT ABDOMEN W/CONTRAST: CPT

## 2020-06-29 RX ORDER — HEPARIN SODIUM (PORCINE) LOCK FLUSH IV SOLN 100 UNIT/ML 100 UNIT/ML
500 SOLUTION INTRAVENOUS PRN
Status: DISCONTINUED | OUTPATIENT
Start: 2020-06-29 | End: 2020-06-30 | Stop reason: HOSPADM

## 2020-06-29 RX ORDER — HEPARIN SODIUM (PORCINE) LOCK FLUSH IV SOLN 100 UNIT/ML 100 UNIT/ML
SOLUTION INTRAVENOUS
Status: COMPLETED
Start: 2020-06-29 | End: 2020-06-29

## 2020-06-29 RX ORDER — ALBUTEROL SULFATE 90 UG/1
2 AEROSOL, METERED RESPIRATORY (INHALATION) EVERY 6 HOURS PRN
COMMUNITY
End: 2020-07-08 | Stop reason: SDUPTHER

## 2020-06-29 RX ADMIN — IOHEXOL 100 ML: 350 INJECTION, SOLUTION INTRAVENOUS at 09:48

## 2020-06-29 RX ADMIN — HEPARIN 500 UNITS: 100 SYRINGE at 10:40

## 2020-06-29 RX ADMIN — HEPARIN 500 UNITS: 100 SYRINGE at 08:53

## 2020-06-29 ASSESSMENT — FIBROSIS 4 INDEX
FIB4 SCORE: 1.83
FIB4 SCORE: 1.83
FIB4 SCORE: 1.18

## 2020-06-29 ASSESSMENT — PAIN SCALES - GENERAL: PAINLEVEL: NO PAIN

## 2020-06-29 ASSESSMENT — ENCOUNTER SYMPTOMS: SHORTNESS OF BREATH: 1

## 2020-06-29 NOTE — PROGRESS NOTES
"Pharmacy Chemotherapy Verification    Patient Name: Karlene Walters   Dx: Colon CA        Cycle 107 ([place] cycle 99 )    Previous treatment: 6/16/20    Protocol: 5-FU     IV over 2 hours on Day 1, followed by    IVP on Day 1, followed by                               -- 10/31/17: delete Leucovorin and 5-FU push d/t neutropenia per Dr. Hardy   Fluorouracil  continuous infusion over 46-48 hours                              -- 20% reduction (1920 mg/m²) to be continued starting C28 per C Alsop APRN    for 12 cycles (adjuvant) or until disease progression or unacceptable toxicity - q28 days per MD orders for tolerance  NCCN Guidelines for Colon Cancer. V.2.2015.  Jay T, et al. Eur J Cancer.1999;35(9):1343-7.      Allergies: Codeine; Oxaliplatin; Pcn; Sulfa drugs; and Tape     /62   Pulse 78   Temp 36.7 °C (98 °F) (Temporal)   Resp 18   Ht 1.623 m (5' 3.9\")   Wt 73.9 kg (162 lb 14.7 oz)   LMP  (LMP Unknown)   SpO2 100%   BMI 28.05 kg/m²  Body surface area is 1.83 meters squared.     Labs 6/29/20:  ANC~ 3140 Plt = 225k   Hgb = 12.3     SCr = 0.81 mg/dL CrCl ~ 64 mL/min   LFT's = 23/20/159 TBili = 0.6      Fluorouracil (5-FU) 1920 mg/m² x 1.83 m² = 3513.6 mg              <10% difference, okay to treat with final dose = 3515 mg CIVI   Via CADD pump for home infusion over 46 hours @ 1.5 mL/hr    Asha Krishnan, PharmD                            "

## 2020-06-29 NOTE — PROGRESS NOTES
Pt arrives to \[facility]\"" for port access for CT scan this morning.  NATALI port has EMLA cream applied to port site.  EMLA cream removed.  Port accessed with power port hernandez needle using sterile technique.  Port flushed with NS and brisk blood return noted.  Labs drawn as ordered for chemotherapy tomorrow.  Port flushed with NS and heparin locked.  Hernandez needle labeled with date and power port needle label.  Port kept accessed for CT.  Cancelled pt's appt for labs tomorrow.  Confirmed appt for treatment at 8:00am with pt.  Pt dc home to self care.

## 2020-06-30 ENCOUNTER — OUTPATIENT INFUSION SERVICES (OUTPATIENT)
Dept: ONCOLOGY | Facility: MEDICAL CENTER | Age: 80
End: 2020-06-30
Attending: INTERNAL MEDICINE
Payer: MEDICARE

## 2020-06-30 ENCOUNTER — APPOINTMENT (OUTPATIENT)
Dept: HEMATOLOGY ONCOLOGY | Facility: MEDICAL CENTER | Age: 80
End: 2020-06-30
Payer: MEDICARE

## 2020-06-30 VITALS
HEART RATE: 78 BPM | SYSTOLIC BLOOD PRESSURE: 129 MMHG | RESPIRATION RATE: 18 BRPM | TEMPERATURE: 98 F | DIASTOLIC BLOOD PRESSURE: 62 MMHG | WEIGHT: 162.92 LBS | OXYGEN SATURATION: 100 % | HEIGHT: 64 IN | BODY MASS INDEX: 27.81 KG/M2

## 2020-06-30 DIAGNOSIS — C20 MALIGNANT NEOPLASM OF RECTUM (HCC): ICD-10-CM

## 2020-06-30 PROCEDURE — G0498 CHEMO EXTEND IV INFUS W/PUMP: HCPCS

## 2020-06-30 PROCEDURE — 700111 HCHG RX REV CODE 636 W/ 250 OVERRIDE (IP): Performed by: INTERNAL MEDICINE

## 2020-06-30 PROCEDURE — 96374 THER/PROPH/DIAG INJ IV PUSH: CPT | Mod: XU

## 2020-06-30 PROCEDURE — 96375 TX/PRO/DX INJ NEW DRUG ADDON: CPT

## 2020-06-30 RX ORDER — DEXAMETHASONE SODIUM PHOSPHATE 4 MG/ML
8 INJECTION, SOLUTION INTRA-ARTICULAR; INTRALESIONAL; INTRAMUSCULAR; INTRAVENOUS; SOFT TISSUE ONCE
Status: COMPLETED | OUTPATIENT
Start: 2020-06-30 | End: 2020-06-30

## 2020-06-30 RX ADMIN — DEXAMETHASONE SODIUM PHOSPHATE 8 MG: 4 INJECTION, SOLUTION INTRA-ARTICULAR; INTRALESIONAL; INTRAMUSCULAR; INTRAVENOUS; SOFT TISSUE at 09:00

## 2020-06-30 RX ADMIN — FLUOROURACIL 3515 MG: 50 INJECTION, SOLUTION INTRAVENOUS at 09:21

## 2020-06-30 ASSESSMENT — FIBROSIS 4 INDEX: FIB4 SCORE: 1.83

## 2020-06-30 NOTE — PROGRESS NOTES
Chemotherapy Verification - PRIMARY RN      Height = 1.623 m  Weight = 73.9 kg  BSA = 1.83 m^2       Medication: fluorouracil  Dose: 1920 mg/m^2 over 46 hours  Calculated Dose: 3513.6 mg                             (In mg/m2, AUC, mg/kg)           I confirm this process was performed independently with the BSA and all final chemotherapy dosing calculations congruent.  Any discrepancies of 10% or greater have been addressed with the chemotherapy pharmacist. The resolution of the discrepancy has been documented in the EPIC progress notes.

## 2020-06-30 NOTE — PROGRESS NOTES
Chemotherapy Verification - SECONDARY RN       Height = 63.9 inches  Weight = 73.9 kg  BSA = 1.82 m2       Medication: Fluorouracil (Adrucil)  Dose: 1920 mg/m2  Calculated Dose: 3,494.4 mg                             (In mg/m2, AUC, mg/kg)         I confirm that this process was performed independently.

## 2020-06-30 NOTE — PROGRESS NOTES
"Pharmacy Chemotherapy Verification Note:    Patient Name: Karlene Walters      Dx: Colon Ca      Protocol: 5-FU +        *Dosing Reference*                                    -- 10/31/17: delete Leucovorin and 5-FU push d/t neutropenia per Dr. Hardy   Fluorouracil 2400 mg/m² continuous infusion over 46-48 hours                              -- 20% reduction (1920 mg/m²) to be continued starting C28 per C Alsop APRN   14 day cycles for 12 cycles (adjuvant) or until disease progression or unacceptable toxicity    NCCN Guidelines for Colon Cancer. V.2.2015.  Jay T, et al. Eur J Cancer.1999;35(9):1343-7.     Allergies:  Oxaliplatin; Codeine; Pcn [penicillins]; Sulfa drugs; and Tape     /62   Pulse 78   Temp 36.7 °C (98 °F) (Temporal)   Resp 18   Ht 1.623 m (5' 3.9\")   Wt 73.9 kg (162 lb 14.7 oz)   LMP  (LMP Unknown)   SpO2 100%   BMI 28.05 kg/m²  Body surface area is 1.83 meters squared.  ANC~ 3140  Plt = 225 k  SCr = 0.81 mg/dL  CrCl = 64 mL/min  LFT = WNL  TBili = 0.6     Drug Order   (Drug name, dose, route, IV Fluid & volume, frequency, number of doses) Cycle: 107 ([place] cycle 99)      Previous treatment: 6/16/20     Medication = Fluorouracil (5-FU)  Base Dose = 1920 mg/m²  Calc Dose: Base Dose x 1.83 m² = 3514 mg  Final Dose = 3515 mg  Route = CIVI via CADD pump  Fluid & Volume = 70.3 mL (+ 3 mL overfill = 73.3 mL)  Admin Duration = Over 46 hours (to run at 1.5 mL/hour)   via CADD pump       <10% difference, ok to treat with final written dose     By my signature below, I confirm this process was performed independently with the BSA and all final chemotherapy dosing calculations congruent. I have reviewed the above chemotherapy order and that my calculation of the final dose and BSA (when applicable) corroborate those calculations of the  pharmacist.     Rudy Arthur, PharmD, BCPS    "

## 2020-06-30 NOTE — PROGRESS NOTES
Akrlene into [facility] for 5FU CADD pump attachment. Pt denied having any new or acute complaints today, reports tolerating past treatments well. Port accessed prior, had + blood return, flushed briskly. Pt given dexamethasone and attached to 5FU CADD pump as prescribed, tolerated well, denied having any complaints. Pump running and no alarms sounding at this time. Next appointment scheduled for disconnect, Karlene discharged home to self care.

## 2020-07-02 ENCOUNTER — OUTPATIENT INFUSION SERVICES (OUTPATIENT)
Dept: ONCOLOGY | Facility: MEDICAL CENTER | Age: 80
End: 2020-07-02
Attending: INTERNAL MEDICINE
Payer: MEDICARE

## 2020-07-02 VITALS
DIASTOLIC BLOOD PRESSURE: 62 MMHG | SYSTOLIC BLOOD PRESSURE: 133 MMHG | BODY MASS INDEX: 27.85 KG/M2 | HEART RATE: 92 BPM | WEIGHT: 163.14 LBS | RESPIRATION RATE: 18 BRPM | HEIGHT: 64 IN | OXYGEN SATURATION: 93 % | TEMPERATURE: 98 F

## 2020-07-02 DIAGNOSIS — C20 MALIGNANT NEOPLASM OF RECTUM (HCC): ICD-10-CM

## 2020-07-02 PROCEDURE — 700111 HCHG RX REV CODE 636 W/ 250 OVERRIDE (IP)

## 2020-07-02 PROCEDURE — 96523 IRRIG DRUG DELIVERY DEVICE: CPT

## 2020-07-02 RX ORDER — HEPARIN SODIUM (PORCINE) LOCK FLUSH IV SOLN 100 UNIT/ML 100 UNIT/ML
SOLUTION INTRAVENOUS
Status: COMPLETED
Start: 2020-07-02 | End: 2020-07-02

## 2020-07-02 RX ADMIN — HEPARIN 500 UNITS: 100 SYRINGE at 11:52

## 2020-07-02 ASSESSMENT — FIBROSIS 4 INDEX: FIB4 SCORE: 1.83

## 2020-07-02 NOTE — PROGRESS NOTES
Pt arrived to [facility], ambulatory, for pump de-access/port re-access for CT. Pt voices no complaints. Pump turned off upon arrival, 0 mL left to be delivered. Pump disconnected and chemotherapy cassette disposed of. Port flushed and de-accessed, port re-accessed in sterile manner, positive blood return noted. Port flushed and heparin locked per policy, port de-accessed. Pt left [facility] with no s/sx of distress. Follow up appointment confirmed.

## 2020-07-08 ENCOUNTER — OFFICE VISIT (OUTPATIENT)
Dept: PULMONOLOGY | Facility: HOSPICE | Age: 80
End: 2020-07-08
Payer: MEDICARE

## 2020-07-08 VITALS
HEIGHT: 64 IN | BODY MASS INDEX: 27.83 KG/M2 | OXYGEN SATURATION: 97 % | SYSTOLIC BLOOD PRESSURE: 130 MMHG | WEIGHT: 163 LBS | DIASTOLIC BLOOD PRESSURE: 76 MMHG | HEART RATE: 104 BPM | RESPIRATION RATE: 16 BRPM

## 2020-07-08 DIAGNOSIS — R91.8 OTHER NONSPECIFIC ABNORMAL FINDING OF LUNG FIELD: ICD-10-CM

## 2020-07-08 DIAGNOSIS — R06.00 DYSPNEA, UNSPECIFIED TYPE: ICD-10-CM

## 2020-07-08 DIAGNOSIS — Z78.9 NONSMOKER: ICD-10-CM

## 2020-07-08 DIAGNOSIS — I10 ESSENTIAL HYPERTENSION: ICD-10-CM

## 2020-07-08 DIAGNOSIS — J44.9 CHRONIC OBSTRUCTIVE PULMONARY DISEASE, UNSPECIFIED COPD TYPE (HCC): ICD-10-CM

## 2020-07-08 DIAGNOSIS — C20 MALIGNANT NEOPLASM OF RECTUM (HCC): Chronic | ICD-10-CM

## 2020-07-08 DIAGNOSIS — J43.9 PULMONARY EMPHYSEMA, UNSPECIFIED EMPHYSEMA TYPE (HCC): ICD-10-CM

## 2020-07-08 DIAGNOSIS — C78.7 SECONDARY MALIGNANT NEOPLASM OF LIVER (HCC): Chronic | ICD-10-CM

## 2020-07-08 DIAGNOSIS — Z86.711 HISTORY OF PULMONARY EMBOLUS (PE): ICD-10-CM

## 2020-07-08 PROCEDURE — 99214 OFFICE O/P EST MOD 30 MIN: CPT | Performed by: NURSE PRACTITIONER

## 2020-07-08 RX ORDER — ALBUTEROL SULFATE 90 UG/1
2 AEROSOL, METERED RESPIRATORY (INHALATION) EVERY 6 HOURS PRN
Qty: 8.5 G | Refills: 11 | Status: SHIPPED | OUTPATIENT
Start: 2020-07-08 | End: 2021-03-18 | Stop reason: SDUPTHER

## 2020-07-08 RX ORDER — TIOTROPIUM BROMIDE AND OLODATEROL 3.124; 2.736 UG/1; UG/1
2 SPRAY, METERED RESPIRATORY (INHALATION) DAILY
Qty: 1 INHALER | Refills: 11 | Status: SHIPPED | OUTPATIENT
Start: 2020-07-08 | End: 2021-05-19 | Stop reason: SDUPTHER

## 2020-07-08 ASSESSMENT — FIBROSIS 4 INDEX: FIB4 SCORE: 1.83

## 2020-07-08 NOTE — PROGRESS NOTES
Chief Complaint   Patient presents with   • Follow-Up     Last Seen 7/17/19        HPI:  Karlene Walters is a 80 y.o. year old female here today for follow-up on dyspnea.      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 Also followed by Dr. West surgery and has had multiple ablations but not candidate for resection. Currently undergoing chemotherapy.     Hx of COPD per Dr. Warren's prior assessment, but no hx of smoking - most likely chronic obstructive asthma.   Hx of PE and on Xarelto chronically.     Never smoker.   PFT 7/17/2019 indicates normal findings with FEV1 2.37 L 114%, FEV1/FVC ratio 82, TLC 95% predicted, DLCO 97% predicted.  No significant bronchodilator response noted.  Patient remains on Stiolto and albuterol HFA inhaler.    Prior echo indicated normal LV function.   HRCT of chest was performed previously  to rule out ILD especially in light of her history of cancer and chemotherapy. This indicated emphysematous lungs and some areas of basilar atelectasis but no evidence of ILD.   She was started on Stiolto 2 puffs QD and MATEO. She feels her breathing has improved overall.   CNOX 7/30/19 indicated only 3.3min <88% with avg O2 sat 93.7%.    CT chest 5/7/2020 indicated:  1.  New ill-defined mass within the right lower lobe anteriorly and inferiorly along the hepatic fissure and at the level of the right hemidiaphragm. This measures 2.6 x 1.6 cm in size. Differential diagnosis includes metastatic disease as well as an   area of inflammatory change/consolidation.  2.  Atherosclerotic vascular calcification.    PET CT 5/14/2020:  Lungs show no hypermetabolic pulmonary nodules.  There is no hypermetabolic hilar, mediastinal, or axillary lymphadenopathy.    1. Necrotic mass in the right hepatic dome with increased uptake, in keeping with viable metastasis.  2. Additional hypermetabolic mass in the inferior right hepatic lobe is consistent with metastasis  as well.  3. No other increased uptake identified.  Reviewed with patient.    Overall she feels well and continues to heal from last liver surgery. She notes some discomfort to area with sneeze/bending. She notes ongoing dyspnea but improved if using Stiolto daily. She is in need of refills.    ROS: As per HPI and otherwise negative if not stated.    Past Medical History:   Diagnosis Date   • Adverse effect of anesthesia     elevated BP postop   • Anesthesia    • Arrhythmia     occasional   • Blood clotting disorder (HCC) 08/2015    bilat PE    • Blood transfusion 1957   • Breath shortness     pt reports d/t chemo treatment; no O2; with moderate exertion; no c/o at this time   • Cancer (HCC) 09/2012    rectal cancer,  Liver CA-2015   • CATARACT     removed b/l   • Chemotherapy-induced neutropenia (HCC) 9/28/2016   • Chickenpox     history of   • Colon cancer (HCC)    • Dental disorder     dentures UPPERS AND LOWERS   • Elevated alkaline phosphatase level 11/15/2017   • Emphysema of lung (HCC)     COPD   • Fall    • Serbian measles     history of   • Heart murmur     as a child   • Hemorrhagic disorder (HCC)     on Xarelto    • High cholesterol    • Hypercholesteremia    • Hypertension     no meds currently    • Ileostomy in place (HCC)    • Ileostomy in place (HCC)    • Indigestion    • Influenza     history of   • Lightheadedness 9/17/2015   • Mumps     history of   • Obstruction     ileostomy   • Other specified symptom associated with female genital organs     HYSTERECTOMY 1993   • Pneumonia 05/2017    tx'd with antibx   • Pulmonary embolism (HCC)    • 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)    • Renal insufficiency 3/14/2016   • Restless legs 5/6/2020   • Routine general medical examination at a health care facility 3/14/2016    3/14/16   • Seasonal allergies    • Swelling of both ankles     Lasix PRN   • Tonsillitis        Past Surgical History:   Procedure Laterality Date   • HEPATIC ABLATION  5/28/2020     Procedure: ABLATION, LESION, LIVER-TRISEGMENTECTOMY, MICROWAVE ABLATION, INTRA OPERATIVE ULTRA SOUND, SIMPLE RESECTED / REPAIR OF DIAPHRAGM;  Surgeon: Kit West M.D.;  Location: SURGERY [facility];  Service: General   • HEPATIC ABLATION LAPAROSCOPIC  11/15/2018    Procedure: HEPATIC ABLATION LAPAROSCOPIC.- SEGMENT 7/8;  Surgeon: Kit West M.D.;  Location: SURGERY [facility];  Service: General   • COLONOSCOPY WITH BIOPSY  8/23/2015    Procedure: COLONOSCOPY WITH BIOPSY;  Surgeon: Wilbur Glasgow M.D.;  Location: ENDOSCOPY [facility];  Service:    • HEPATIC ABLATION LAPAROSCOPIC  7/6/2015    Procedure: HEPATIC ABLATION LAPAROSCOPIC.;  Surgeon: Kit West M.D.;  Location: SURGERY [facility];  Service:    • CATH PLACEMENT Left 7/6/2015    Procedure: CATH PLACEMENT CEPHALIC POWERPORT;  Surgeon: Kit West M.D.;  Location: SURGERY [facility];  Service:    • FISTULA IN ANO REPAIR  1/28/2015    Performed by Kolton Montgomery M.D. at [facility]   • PERINEAL PROCEDURE  1/28/2015    Performed by Kolton Montgomery M.D. at [facility]   • FISTULA IN ANO REPAIR  10/15/2014    Performed by Kolton Montgomery M.D. at [facility]   • PERINEAL PROCEDURE  10/15/2014    Performed by Kolton Montgomery M.D. at [facility]   • IRRIGATION & DEBRIDEMENT GENERAL  2/19/2014    Performed by Kolton Montgomery M.D. at [facility]   • FLAP GRAFT  2/19/2014    Performed by Kolton Montgomery M.D. at [facility]   • PERINEAL PROCEDURE  12/18/2013    Performed by Kolton Montgomery M.D. at [facility]   • MYOCUTANEOUS FLAP  12/18/2013    Performed by Kolton Montgomery M.D. at [facility]   • IRRIGATION & DEBRIDEMENT GENERAL  10/23/2013    Performed by Kolton Montgomery M.D. at [facility]   • FISTULA IN ANO REPAIR  9/4/2013    Performed by Kolton Montgomery M.D. at SURGERY [facility]   • FLAP  ROTATION  9/4/2013    Performed by Kolton Montgomery M.D. at SURGERY [facility]   • RECOVERY  8/26/2013    Performed by Cath-Recovery Surgery at [facility] SAME DAY [facility]   • BIOPSY GENERAL  7/17/2013    Performed by Kolton Montgomery M.D. at SURGERY [facility]   • PROCTOSCOPY  7/17/2013    Performed by Kolton Montgomery M.D. at SURGERY [facility]   • FISTULA IN ANO REPAIR  2/27/2013    Performed by Kolton Montgomery M.D. at SURGERY [facility]   • SIGMOIDOSCOPY  2/27/2013    Performed by Kolton Montgomery M.D. at [facility]   • LAPAROSCOPY  10/8/2012    Performed by Kolton Montgomery M.D. at [facility]   • ILEO LOOP DIVERSION  10/8/2012    Performed by Kolton Montgomery M.D. at SURGERY [facility]   • COLECTOMY  9/26/2012    Performed by Kolton Montgomery M.D. at SURGERY [facility]   • COLONOSCOPY FLEX W/ENDO US  9/20/2012    Performed by Harshil Bolton M.D. at SURGERY [facility]   • OTHER  2009    left eye torn retina repair   • CATARACT EXTRACTION WITH IOL  2004    bilateral   • ABDOMINAL HYSTERECTOMY TOTAL  1983   • CYST EXCISION  1972    ovarian   • COLON RESECTION     • EYE SURGERY      cataracts   • HYSTERECTOMY LAPAROSCOPY     • PB REMV 2ND CATARACT,CORN-SCLER SECTN     • TONSILLECTOMY         Family History   Problem Relation Age of Onset   • Heart Disease Mother    • Heart Failure Mother    • Hypertension Mother    • Hyperlipidemia Mother    • Cancer Mother    • Cancer Maternal Aunt 60        breast ca   • Lung Disease Brother         COPD/Emphysema/Smoker   • Cancer Brother         prostate cancer   • Alcohol/Drug Brother         Alcohol   • Kidney Disease Father         renal failure       Social History     Socioeconomic History   • Marital status:      Spouse name: Not on file   • Number of children: Not on file   • Years of education: Not on file   • Highest education level: Not on file   Occupational History   • Not on file   Social Needs   • Financial resource  strain: Not hard at all   • Food insecurity     Worry: Never true     Inability: Never true   • Transportation needs     Medical: No     Non-medical: No   Tobacco Use   • Smoking status: Never Smoker   • Smokeless tobacco: Never Used   Substance and Sexual Activity   • Alcohol use: No   • Drug use: No   • Sexual activity: Never     Partners: Male     Birth control/protection: Post-Menopausal   Lifestyle   • Physical activity     Days per week: Not on file     Minutes per session: Not on file   • Stress: Not on file   Relationships   • Social connections     Talks on phone: Not on file     Gets together: Not on file     Attends [employer]ism service: Not on file     Active member of club or organization: Not on file     Attends meetings of clubs or organizations: Not on file     Relationship status: Not on file   • Intimate partner violence     Fear of current or ex partner: Not on file     Emotionally abused: Not on file     Physically abused: Not on file     Forced sexual activity: Not on file   Other Topics Concern   • Primary/coprimary nurse & associates Not Asked   • Family contact information Not Asked   • OK to release patient information to the following Not Asked   • Patient preferred routine/Privacy concerns Not Asked   • Patient likes and dislikes Not Asked   • Participating In Research Study Not Asked   • Miscellaneous Not Asked   Social History Narrative   • Not on file       Allergies as of 07/08/2020 - Reviewed 07/08/2020   Allergen Reaction Noted   • Oxaliplatin Anaphylaxis 10/27/2015   • Codeine  04/05/2011   • Pcn [penicillins] Itching 04/05/2011   • Sulfa drugs Itching 04/05/2011   • Tape Rash 09/04/2013        Vitals:  @Vital signs for this encounter:    Current medications as of today   Current Outpatient Medications   Medication Sig Dispense Refill   • albuterol 108 (90 Base) MCG/ACT Aero Soln inhalation aerosol Inhale 2 Puffs by mouth every 6 hours as needed for Shortness of Breath.     •  Tiotropium Bromide-Olodaterol (STIOLTO RESPIMAT) 2.5-2.5 MCG/ACT Aero Soln Take 2 Puffs by mouth every day. 1 Inhaler 2   • potassium chloride ER (KLOR-CON) 10 MEQ tablet Take 1 Tab by mouth every day. 100 Tab 3   • rivaroxaban (XARELTO) 10 MG Tab tablet Take 1 Tab by mouth every day. (Patient taking differently: Take 10 mg by mouth every day at 6 PM.) 90 Tab 1   • lidocaine-prilocaine (EMLA) 2.5-2.5 % Cream APPLY A THICK FILM OVER THE PORT ACCESS SITE PRIOR TO ACCESS 30 g 3   • ondansetron (ZOFRAN) 4 MG Tab tablet Take 1 Tab by mouth as needed. 30 Tab 3   • metronidazole (METROCREAM) 0.75 % cream Apply 1 Each to affected area(s) 2 times a day.     • cyanocobalamin (VITAMIN B-12) 500 MCG Tab Take 1,000 mcg by mouth every day.     • Multiple Vitamins-Minerals (CENTRUM SILVER PO) Take 1 Tab by mouth every day.     • Cholecalciferol (VITAMIN D3) 400 UNITS CAPS Take 1 Cap by mouth every day.     • loratadine (CLARITIN) 10 MG TABS Take 10 mg by mouth every day. Indications: Hayfever       No current facility-administered medications for this visit.          Physical Exam:   Gen:           Alert and oriented, No apparent distress. Mood and affect appropriate, normal interaction with examiner.  Eyes:          PERRL, EOM intact, sclere white, conjunctive moist.  Ears:          Not examined.   Hearing:     Grossly intact.  Nose:          Normal, no lesions or deformities.  Dentition:    Good dentition.  Oropharynx:   mask  Mallampati Classification: mask  Neck:        Supple, trachea midline, no masses.  Respiratory Effort: No intercostal retractions or use of accessory muscles.   Lung Auscultation:      Clear to auscultation bilaterally; no rales, rhonchi or wheezing.  CV:            Regular rate and rhythm. No murmurs, rubs or gallops.  Abd:           Not examined. Soft non tender, non distended. Normal active bowel sounds. No masses.  Lymphadenopathy: No palpable nodes or edema.  Gait and Station: Normal.  Digits and  Nails: No clubbing, cyanosis, petechiae, or nodes.   Cranial Nerves: II-XII grossly intact.  Skin:        No rashes, lesions or ulcers noted.               Ext:           No cyanosis or edema.      Assessment:  1. Dyspnea, unspecified type     2. Malignant neoplasm of rectum (HCC)     3. Secondary malignant neoplasm of liver (HCC)     4. History of pulmonary embolus (PE)     5. Essential hypertension     6. BMI 28.0-28.9,adult     7. Nonsmoker         Immunizations:    Flu:11/2019  Pneumovax 23:9/2006  Prevnar 13:2/2018    Plan:  1. Dyspnea is clinically stable. Prior HRCT noted mild emphysema and patient continues to benefit from stiolto and cinthya use. Refills given. Recent PET noted no significant changes in lungs.  Due to ongoing chemo, recommend ongoing serial CT's by surgery/onc and continued PFT's.  PFT in 6mos  2. Discussed respiratory hygiene  3. Continue regular walking  4. F/u in 6 mos with PFT to check symptoms, sooner if needed.    Please note that this dictation was created using voice recognition software. I have made every reasonable attempt to correct obvious errors, but it is possible there are errors of grammar and possibly content that I did not discover before finalizing the note.

## 2020-07-13 ENCOUNTER — TELEPHONE (OUTPATIENT)
Dept: ONCOLOGY | Facility: MEDICAL CENTER | Age: 80
End: 2020-07-13

## 2020-07-14 ENCOUNTER — OUTPATIENT INFUSION SERVICES (OUTPATIENT)
Dept: ONCOLOGY | Facility: MEDICAL CENTER | Age: 80
End: 2020-07-14
Attending: INTERNAL MEDICINE
Payer: MEDICARE

## 2020-07-14 ENCOUNTER — OFFICE VISIT (OUTPATIENT)
Dept: HEMATOLOGY ONCOLOGY | Facility: MEDICAL CENTER | Age: 80
End: 2020-07-14
Payer: MEDICARE

## 2020-07-14 VITALS
OXYGEN SATURATION: 96 % | RESPIRATION RATE: 18 BRPM | BODY MASS INDEX: 27.59 KG/M2 | DIASTOLIC BLOOD PRESSURE: 73 MMHG | HEIGHT: 64 IN | WEIGHT: 161.6 LBS | TEMPERATURE: 97.4 F | HEART RATE: 102 BPM | SYSTOLIC BLOOD PRESSURE: 131 MMHG

## 2020-07-14 VITALS
RESPIRATION RATE: 18 BRPM | WEIGHT: 161.6 LBS | DIASTOLIC BLOOD PRESSURE: 73 MMHG | BODY MASS INDEX: 27.59 KG/M2 | HEART RATE: 102 BPM | SYSTOLIC BLOOD PRESSURE: 131 MMHG | TEMPERATURE: 97.4 F | HEIGHT: 64 IN | OXYGEN SATURATION: 96 %

## 2020-07-14 VITALS
SYSTOLIC BLOOD PRESSURE: 136 MMHG | HEART RATE: 101 BPM | HEIGHT: 64 IN | TEMPERATURE: 97.8 F | DIASTOLIC BLOOD PRESSURE: 68 MMHG | RESPIRATION RATE: 16 BRPM | WEIGHT: 161.49 LBS | OXYGEN SATURATION: 97 % | BODY MASS INDEX: 27.57 KG/M2

## 2020-07-14 DIAGNOSIS — Z79.899 ENCOUNTER FOR LONG-TERM CURRENT USE OF HIGH RISK MEDICATION: ICD-10-CM

## 2020-07-14 DIAGNOSIS — C20 MALIGNANT NEOPLASM OF RECTUM (HCC): ICD-10-CM

## 2020-07-14 DIAGNOSIS — C78.7 SECONDARY MALIGNANT NEOPLASM OF LIVER (HCC): Chronic | ICD-10-CM

## 2020-07-14 DIAGNOSIS — C20 MALIGNANT NEOPLASM OF RECTUM (HCC): Chronic | ICD-10-CM

## 2020-07-14 LAB
ALBUMIN SERPL BCP-MCNC: 3.8 G/DL (ref 3.2–4.9)
ALBUMIN/GLOB SERPL: 1.3 G/DL
ALP SERPL-CCNC: 130 U/L (ref 30–99)
ALT SERPL-CCNC: 20 U/L (ref 2–50)
ANION GAP SERPL CALC-SCNC: 16 MMOL/L (ref 7–16)
AST SERPL-CCNC: 23 U/L (ref 12–45)
BASOPHILS # BLD AUTO: 0.8 % (ref 0–1.8)
BASOPHILS # BLD: 0.04 K/UL (ref 0–0.12)
BILIRUB SERPL-MCNC: 0.7 MG/DL (ref 0.1–1.5)
BUN SERPL-MCNC: 18 MG/DL (ref 8–22)
CALCIUM SERPL-MCNC: 9.5 MG/DL (ref 8.5–10.5)
CEA SERPL-MCNC: 1.5 NG/ML (ref 0–3)
CHLORIDE SERPL-SCNC: 103 MMOL/L (ref 96–112)
CO2 SERPL-SCNC: 21 MMOL/L (ref 20–33)
CREAT SERPL-MCNC: 1.2 MG/DL (ref 0.5–1.4)
EOSINOPHIL # BLD AUTO: 0.25 K/UL (ref 0–0.51)
EOSINOPHIL NFR BLD: 5.3 % (ref 0–6.9)
ERYTHROCYTE [DISTWIDTH] IN BLOOD BY AUTOMATED COUNT: 60.4 FL (ref 35.9–50)
GLOBULIN SER CALC-MCNC: 2.9 G/DL (ref 1.9–3.5)
GLUCOSE SERPL-MCNC: 134 MG/DL (ref 65–99)
HCT VFR BLD AUTO: 40.6 % (ref 37–47)
HGB BLD-MCNC: 12.7 G/DL (ref 12–16)
IMM GRANULOCYTES # BLD AUTO: 0.01 K/UL (ref 0–0.11)
IMM GRANULOCYTES NFR BLD AUTO: 0.2 % (ref 0–0.9)
LYMPHOCYTES # BLD AUTO: 1.52 K/UL (ref 1–4.8)
LYMPHOCYTES NFR BLD: 32.3 % (ref 22–41)
MCH RBC QN AUTO: 26.7 PG (ref 27–33)
MCHC RBC AUTO-ENTMCNC: 31.3 G/DL (ref 33.6–35)
MCV RBC AUTO: 85.3 FL (ref 81.4–97.8)
MONOCYTES # BLD AUTO: 0.57 K/UL (ref 0–0.85)
MONOCYTES NFR BLD AUTO: 12.1 % (ref 0–13.4)
NEUTROPHILS # BLD AUTO: 2.32 K/UL (ref 2–7.15)
NEUTROPHILS NFR BLD: 49.3 % (ref 44–72)
NRBC # BLD AUTO: 0 K/UL
NRBC BLD-RTO: 0 /100 WBC
PLATELET # BLD AUTO: 234 K/UL (ref 164–446)
PMV BLD AUTO: 10 FL (ref 9–12.9)
POTASSIUM SERPL-SCNC: 3.8 MMOL/L (ref 3.6–5.5)
PROT SERPL-MCNC: 6.7 G/DL (ref 6–8.2)
RBC # BLD AUTO: 4.76 M/UL (ref 4.2–5.4)
SODIUM SERPL-SCNC: 140 MMOL/L (ref 135–145)
WBC # BLD AUTO: 4.7 K/UL (ref 4.8–10.8)

## 2020-07-14 PROCEDURE — 85025 COMPLETE CBC W/AUTO DIFF WBC: CPT

## 2020-07-14 PROCEDURE — 80053 COMPREHEN METABOLIC PANEL: CPT

## 2020-07-14 PROCEDURE — 700111 HCHG RX REV CODE 636 W/ 250 OVERRIDE (IP)

## 2020-07-14 PROCEDURE — A4212 NON CORING NEEDLE OR STYLET: HCPCS

## 2020-07-14 PROCEDURE — 700111 HCHG RX REV CODE 636 W/ 250 OVERRIDE (IP): Performed by: INTERNAL MEDICINE

## 2020-07-14 PROCEDURE — 99214 OFFICE O/P EST MOD 30 MIN: CPT | Performed by: NURSE PRACTITIONER

## 2020-07-14 PROCEDURE — 82378 CARCINOEMBRYONIC ANTIGEN: CPT

## 2020-07-14 PROCEDURE — 96374 THER/PROPH/DIAG INJ IV PUSH: CPT | Mod: XU

## 2020-07-14 PROCEDURE — G0498 CHEMO EXTEND IV INFUS W/PUMP: HCPCS

## 2020-07-14 RX ORDER — HEPARIN SODIUM (PORCINE) LOCK FLUSH IV SOLN 100 UNIT/ML 100 UNIT/ML
SOLUTION INTRAVENOUS
Status: COMPLETED
Start: 2020-07-14 | End: 2020-07-14

## 2020-07-14 RX ORDER — DEXAMETHASONE SODIUM PHOSPHATE 4 MG/ML
8 INJECTION, SOLUTION INTRA-ARTICULAR; INTRALESIONAL; INTRAMUSCULAR; INTRAVENOUS; SOFT TISSUE ONCE
Status: COMPLETED | OUTPATIENT
Start: 2020-07-14 | End: 2020-07-14

## 2020-07-14 RX ADMIN — FLUOROURACIL 3495 MG: 50 INJECTION, SOLUTION INTRAVENOUS at 09:18

## 2020-07-14 RX ADMIN — DEXAMETHASONE SODIUM PHOSPHATE 8 MG: 4 INJECTION, SOLUTION INTRA-ARTICULAR; INTRALESIONAL; INTRAMUSCULAR; INTRAVENOUS; SOFT TISSUE at 09:04

## 2020-07-14 RX ADMIN — HEPARIN 5 ML: 100 SYRINGE at 07:25

## 2020-07-14 ASSESSMENT — ENCOUNTER SYMPTOMS
VOMITING: 0
INSOMNIA: 0
ROS GI COMMENTS: RLQ OSTOMY
MYALGIAS: 0
SHORTNESS OF BREATH: 1
FEVER: 0
DIARRHEA: 0
HEADACHES: 0
CONSTIPATION: 0
PALPITATIONS: 0
WHEEZING: 0
ABDOMINAL PAIN: 1
DIZZINESS: 0
WEIGHT LOSS: 0
COUGH: 0
CHILLS: 0
TINGLING: 0
NAUSEA: 0

## 2020-07-14 ASSESSMENT — FIBROSIS 4 INDEX
FIB4 SCORE: 1.83
FIB4 SCORE: 1.76
FIB4 SCORE: 1.83

## 2020-07-14 ASSESSMENT — PAIN SCALES - GENERAL: PAINLEVEL: NO PAIN

## 2020-07-14 NOTE — PROGRESS NOTES
"Pharmacy Chemotherapy Verification    Patient Name: Karlene Walters   Dx: Colon CA        Cycle 108 ([place] cycle 100 )    Previous treatment: 6/16/20    Protocol: 5-FU     IV over 2 hours on Day 1, followed by    IVP on Day 1, followed by                               -- 10/31/17: delete Leucovorin and 5-FU push d/t neutropenia per Dr. Hardy   Fluorouracil  continuous infusion over 46-48 hours                              -- 20% reduction (1920 mg/m²) to be continued starting C28 per C Alsop APRN    for 12 cycles (adjuvant) or until disease progression or unacceptable toxicity - q28 days per MD orders for tolerance  NCCN Guidelines for Colon Cancer. V.2.2015.  Jay T, et al. Eur J Cancer.1999;35(9):1343-7.      Allergies: Codeine; Oxaliplatin; Pcn; Sulfa drugs; and Tape     /73   Pulse (!) 102   Temp 36.3 °C (97.4 °F) (Temporal)   Resp 18   Ht 1.623 m (5' 3.9\")   Wt 73.3 kg (161 lb 9.6 oz)   LMP  (LMP Unknown)   SpO2 96%   BMI 27.83 kg/m²  Body surface area is 1.82 meters squared.     All lab results 7/14/20 within treatment parameters.       Fluorouracil (5-FU) 1920 mg/m² x 1.82 m² = 3494mg              <10% difference, okay to treat with final dose = 3495 mg CIVI   Via CADD pump for home infusion over 46 hours @ 1.5mL/hr    Nakia Arroyo, PharmD                            "

## 2020-07-14 NOTE — PROGRESS NOTES
Chemotherapy Verification - PRIMARY RN      Height = 162.3 cm  Weight = 73.2 kg  BSA = 1.82 m2       Medication: 5FU CADD Dose: 1920 mg/m2  Calculated Dose: 3494 mg                             (In mg/m2, AUC, mg/kg)     I confirm this process was performed independently with the BSA and all final chemotherapy dosing calculations congruent.  Any discrepancies of 10% or greater have been addressed with the chemotherapy pharmacist. The resolution of the discrepancy has been documented in the EPIC progress notes.

## 2020-07-14 NOTE — PROGRESS NOTES
Pt arrived ambulatory to  for 5FU CADD pump connection.  POC discussed and labs results reviewed.  Premedication given as ordered.  Pump connected without issue for 46 hour infusion.  Next appointment confirmed.  Pt discharged from IS in NAD under self care.

## 2020-07-14 NOTE — PROGRESS NOTES
Patient presents for lab draw prior to chemotherapy later this AM. Port accessed using sterile technique, blood drawn as ordered. Port flushed per protocol and left in place. Patient released in no acute distress.

## 2020-07-14 NOTE — PROGRESS NOTES
Chemotherapy Verification - SECONDARY RN       Height = 162.3 cm  Weight = 73.3 kg  BSA = 1.82 m2       Medication: Adrucil  Dose: 1,920 mg/m2  Calculated Dose: 3,494.4 mg over 46 hours                             (In mg/m2, AUC, mg/kg)         I confirm that this process was performed independently.

## 2020-07-14 NOTE — PROGRESS NOTES
"Pharmacy Chemotherapy Verification Note:    Patient Name: Karlene Walters      Dx: Colon Ca      Protocol: 5-FU +                                     -- 10/31/17: delete Leucovorin and 5-FU push d/t neutropenia per Dr. Hardy   Fluorouracil 2400 mg/m² continuous infusion over 46-48 hours                              -- 20% reduction (1920 mg/m²) to be continued starting C28 per C.Alsop, APRN   14 day cycles for 12 cycles (adjuvant) or until disease progression or unacceptable toxicity  *Dosing Reference*   NCCN Guidelines for Colon Cancer. V.2.2015.  Jay T, et al. Eur J Cancer.1999;35(9):1343-7.     Allergies:  Oxaliplatin; Codeine; Pcn [penicillins]; Sulfa drugs; and Tape     /73   Pulse (!) 102   Temp 36.3 °C (97.4 °F) (Temporal)   Resp 18   Ht 1.623 m (5' 3.9\")   Wt 73.3 kg (161 lb 9.6 oz)   LMP  (LMP Unknown)   SpO2 96%   BMI 27.83 kg/m²  Body surface area is 1.82 meters squared.    Labs: 7/14/20  Meets treatment parameters     Drug Order   (Drug name, dose, route, IV Fluid & volume, frequency, number of doses) Cycle: 108 ([place] cycle 100)      Previous treatment: 6/30/20     Medication = Fluorouracil (5-FU)  Base Dose = 1920 mg/m²  Calc Dose: Base Dose x 1.82 m² = 3494.4 mg  Final Dose = 3495 mg  Route = CIVI via CADD pump  Fluid & Volume = 69.9 mL (+ 3 mL overfill = 72.9 mL)  Admin Duration = Over 46 hours (to run at 1.5 mL/hour)   via CADD pump       <10% difference, ok to treat with final written dose             "

## 2020-07-15 ENCOUNTER — APPOINTMENT (RX ONLY)
Dept: URBAN - METROPOLITAN AREA CLINIC 4 | Facility: CLINIC | Age: 80
Setting detail: DERMATOLOGY
End: 2020-07-15

## 2020-07-15 DIAGNOSIS — D18.0 HEMANGIOMA: ICD-10-CM

## 2020-07-15 DIAGNOSIS — D22 MELANOCYTIC NEVI: ICD-10-CM

## 2020-07-15 DIAGNOSIS — L81.4 OTHER MELANIN HYPERPIGMENTATION: ICD-10-CM

## 2020-07-15 DIAGNOSIS — L82.1 OTHER SEBORRHEIC KERATOSIS: ICD-10-CM

## 2020-07-15 DIAGNOSIS — Z71.89 OTHER SPECIFIED COUNSELING: ICD-10-CM

## 2020-07-15 PROBLEM — D18.01 HEMANGIOMA OF SKIN AND SUBCUTANEOUS TISSUE: Status: ACTIVE | Noted: 2020-07-15

## 2020-07-15 PROBLEM — D22.9 MELANOCYTIC NEVI, UNSPECIFIED: Status: ACTIVE | Noted: 2020-07-15

## 2020-07-15 PROCEDURE — ? SUNSCREEN RECOMMENDATIONS

## 2020-07-15 PROCEDURE — 99213 OFFICE O/P EST LOW 20 MIN: CPT

## 2020-07-15 PROCEDURE — ? COUNSELING

## 2020-07-16 ENCOUNTER — OUTPATIENT INFUSION SERVICES (OUTPATIENT)
Dept: ONCOLOGY | Facility: MEDICAL CENTER | Age: 80
End: 2020-07-16
Attending: INTERNAL MEDICINE
Payer: MEDICARE

## 2020-07-16 VITALS
HEART RATE: 106 BPM | HEIGHT: 64 IN | SYSTOLIC BLOOD PRESSURE: 129 MMHG | TEMPERATURE: 98 F | DIASTOLIC BLOOD PRESSURE: 81 MMHG | BODY MASS INDEX: 27.78 KG/M2 | OXYGEN SATURATION: 97 % | WEIGHT: 162.7 LBS | RESPIRATION RATE: 18 BRPM

## 2020-07-16 DIAGNOSIS — C20 MALIGNANT NEOPLASM OF RECTUM (HCC): ICD-10-CM

## 2020-07-16 PROCEDURE — 96523 IRRIG DRUG DELIVERY DEVICE: CPT

## 2020-07-16 PROCEDURE — 700111 HCHG RX REV CODE 636 W/ 250 OVERRIDE (IP)

## 2020-07-16 RX ORDER — HEPARIN SODIUM (PORCINE) LOCK FLUSH IV SOLN 100 UNIT/ML 100 UNIT/ML
500 SOLUTION INTRAVENOUS PRN
Status: DISCONTINUED | OUTPATIENT
Start: 2020-07-16 | End: 2020-07-16 | Stop reason: HOSPADM

## 2020-07-16 RX ORDER — HEPARIN SODIUM (PORCINE) LOCK FLUSH IV SOLN 100 UNIT/ML 100 UNIT/ML
SOLUTION INTRAVENOUS
Status: COMPLETED
Start: 2020-07-16 | End: 2020-07-16

## 2020-07-16 RX ADMIN — HEPARIN SODIUM (PORCINE) LOCK FLUSH IV SOLN 100 UNIT/ML 500 UNITS: 100 SOLUTION at 10:33

## 2020-07-16 RX ADMIN — HEPARIN 500 UNITS: 100 SYRINGE at 10:33

## 2020-07-16 ASSESSMENT — FIBROSIS 4 INDEX: FIB4 SCORE: 1.76

## 2020-07-16 NOTE — PROGRESS NOTES
Patient seen today for port de-access (post 46 hour home infusion of 5FU).  Pump off and chemo bag verified empty with full dose given.  Port flushed per protocol.  Port de-accessed, needle intact, gauze dressing applied.  Patient discharged in good condition.  Returns in two weeks, appointment confirmed.

## 2020-07-20 ASSESSMENT — ENCOUNTER SYMPTOMS: SENSORY CHANGE: 1

## 2020-07-27 RX ORDER — DEXAMETHASONE SODIUM PHOSPHATE 4 MG/ML
8 INJECTION, SOLUTION INTRA-ARTICULAR; INTRALESIONAL; INTRAMUSCULAR; INTRAVENOUS; SOFT TISSUE ONCE
Status: CANCELLED | OUTPATIENT
Start: 2020-08-11

## 2020-07-27 RX ORDER — ONDANSETRON 8 MG/1
8 TABLET, ORALLY DISINTEGRATING ORAL
Status: CANCELLED | OUTPATIENT
Start: 2020-08-11

## 2020-07-27 RX ORDER — ONDANSETRON 8 MG/1
8 TABLET, ORALLY DISINTEGRATING ORAL
Status: CANCELLED | OUTPATIENT
Start: 2020-08-25

## 2020-07-27 RX ORDER — LIDOCAINE HYDROCHLORIDE 10 MG/ML
10 INJECTION, SOLUTION INFILTRATION; PERINEURAL ONCE
Status: CANCELLED | OUTPATIENT
Start: 2020-08-11

## 2020-07-27 RX ORDER — HEPARIN SODIUM (PORCINE) LOCK FLUSH IV SOLN 100 UNIT/ML 100 UNIT/ML
500 SOLUTION INTRAVENOUS PRN
Status: CANCELLED | OUTPATIENT
Start: 2020-08-27

## 2020-07-27 RX ORDER — ONDANSETRON 2 MG/ML
4 INJECTION INTRAMUSCULAR; INTRAVENOUS
Status: CANCELLED | OUTPATIENT
Start: 2020-08-25

## 2020-07-27 RX ORDER — DEXTROSE MONOHYDRATE 50 MG/ML
INJECTION, SOLUTION INTRAVENOUS CONTINUOUS
Status: CANCELLED | OUTPATIENT
Start: 2020-07-28

## 2020-07-27 RX ORDER — HEPARIN SODIUM (PORCINE) LOCK FLUSH IV SOLN 100 UNIT/ML 100 UNIT/ML
500 SOLUTION INTRAVENOUS PRN
Status: CANCELLED | OUTPATIENT
Start: 2020-08-25

## 2020-07-27 RX ORDER — PROCHLORPERAZINE MALEATE 10 MG
10 TABLET ORAL EVERY 6 HOURS PRN
Status: CANCELLED | OUTPATIENT
Start: 2020-07-28

## 2020-07-27 RX ORDER — ONDANSETRON 2 MG/ML
4 INJECTION INTRAMUSCULAR; INTRAVENOUS
Status: CANCELLED | OUTPATIENT
Start: 2020-08-11

## 2020-07-27 RX ORDER — ONDANSETRON 2 MG/ML
4 INJECTION INTRAMUSCULAR; INTRAVENOUS
Status: CANCELLED | OUTPATIENT
Start: 2020-07-28

## 2020-07-27 RX ORDER — ONDANSETRON 8 MG/1
8 TABLET, ORALLY DISINTEGRATING ORAL
Status: CANCELLED | OUTPATIENT
Start: 2020-07-28

## 2020-07-27 RX ORDER — HEPARIN SODIUM (PORCINE) LOCK FLUSH IV SOLN 100 UNIT/ML 100 UNIT/ML
500 SOLUTION INTRAVENOUS PRN
Status: CANCELLED | OUTPATIENT
Start: 2020-07-28

## 2020-07-27 RX ORDER — DEXAMETHASONE SODIUM PHOSPHATE 4 MG/ML
8 INJECTION, SOLUTION INTRA-ARTICULAR; INTRALESIONAL; INTRAMUSCULAR; INTRAVENOUS; SOFT TISSUE ONCE
Status: CANCELLED | OUTPATIENT
Start: 2020-08-25

## 2020-07-27 RX ORDER — HEPARIN SODIUM (PORCINE) LOCK FLUSH IV SOLN 100 UNIT/ML 100 UNIT/ML
500 SOLUTION INTRAVENOUS PRN
Status: CANCELLED | OUTPATIENT
Start: 2020-08-11

## 2020-07-27 RX ORDER — HEPARIN SODIUM (PORCINE) LOCK FLUSH IV SOLN 100 UNIT/ML 100 UNIT/ML
500 SOLUTION INTRAVENOUS PRN
Status: CANCELLED | OUTPATIENT
Start: 2020-07-30

## 2020-07-27 RX ORDER — DEXTROSE MONOHYDRATE 50 MG/ML
INJECTION, SOLUTION INTRAVENOUS CONTINUOUS
Status: CANCELLED | OUTPATIENT
Start: 2020-08-25

## 2020-07-27 RX ORDER — PROCHLORPERAZINE MALEATE 10 MG
10 TABLET ORAL EVERY 6 HOURS PRN
Status: CANCELLED | OUTPATIENT
Start: 2020-08-25

## 2020-07-27 RX ORDER — DEXAMETHASONE SODIUM PHOSPHATE 4 MG/ML
8 INJECTION, SOLUTION INTRA-ARTICULAR; INTRALESIONAL; INTRAMUSCULAR; INTRAVENOUS; SOFT TISSUE ONCE
Status: CANCELLED | OUTPATIENT
Start: 2020-07-28

## 2020-07-27 RX ORDER — PROCHLORPERAZINE MALEATE 10 MG
10 TABLET ORAL EVERY 6 HOURS PRN
Status: CANCELLED | OUTPATIENT
Start: 2020-08-11

## 2020-07-27 RX ORDER — LIDOCAINE HYDROCHLORIDE 10 MG/ML
10 INJECTION, SOLUTION INFILTRATION; PERINEURAL ONCE
Status: CANCELLED | OUTPATIENT
Start: 2020-07-28

## 2020-07-27 RX ORDER — HEPARIN SODIUM (PORCINE) LOCK FLUSH IV SOLN 100 UNIT/ML 100 UNIT/ML
500 SOLUTION INTRAVENOUS PRN
Status: CANCELLED | OUTPATIENT
Start: 2020-08-13

## 2020-07-27 RX ORDER — LIDOCAINE HYDROCHLORIDE 10 MG/ML
10 INJECTION, SOLUTION INFILTRATION; PERINEURAL ONCE
Status: CANCELLED | OUTPATIENT
Start: 2020-08-25

## 2020-07-27 RX ORDER — DEXTROSE MONOHYDRATE 50 MG/ML
INJECTION, SOLUTION INTRAVENOUS CONTINUOUS
Status: CANCELLED | OUTPATIENT
Start: 2020-08-11

## 2020-07-27 NOTE — PROGRESS NOTES
"Pharmacy Chemotherapy Verification    Patient Name: Karlene Walters   Dx: Colon CA        Cycle 109 ([place] cycle 101 )    Previous treatment: 7/14/20    Protocol: 5-FU     IV over 2 hours on Day 1, followed by    IVP on Day 1, followed by                               -- 10/31/17: delete Leucovorin and 5-FU push d/t neutropenia per Dr. Hardy   Fluorouracil  continuous infusion over 46-48 hours                              -- 20% reduction (1920 mg/m²) to be continued starting C28 per C Alsop APRN    for 12 cycles (adjuvant) or until disease progression or unacceptable toxicity - q28 days per MD orders for tolerance  NCCN Guidelines for Colon Cancer. V.2.2015.  Jay T, et al. Eur J Cancer.1999;35(9):1343-7.      Allergies: Codeine; Oxaliplatin; Pcn; Sulfa drugs; and Tape     /79   Pulse 96   Temp 36.2 °C (97.1 °F) (Temporal) Comment: HT AND W/T TRANSFERED FROM THIS AM.  Resp 18   Ht 1.626 m (5' 4.02\")   Wt 73.3 kg (161 lb 9.6 oz) Comment: V/S TRANSFERED FROM THIS AM.  LMP  (LMP Unknown)   SpO2 100%   BMI 27.72 kg/m²  Body surface area is 1.82 meters squared.     All lab results 7/28/20 within treatment parameters.     Fluorouracil (5-FU) 1920 mg/m² x 1.82 m² = 3494 mg              <10% difference, okay to treat with final dose = 3495 mg CIVI   Via CADD pump for home infusion over 46 hours @ 1.5 mL/hr    Migue MottaD                            "

## 2020-07-28 ENCOUNTER — OUTPATIENT INFUSION SERVICES (OUTPATIENT)
Dept: ONCOLOGY | Facility: MEDICAL CENTER | Age: 80
End: 2020-07-28
Attending: INTERNAL MEDICINE
Payer: MEDICARE

## 2020-07-28 ENCOUNTER — OFFICE VISIT (OUTPATIENT)
Dept: HEMATOLOGY ONCOLOGY | Facility: MEDICAL CENTER | Age: 80
End: 2020-07-28
Payer: MEDICARE

## 2020-07-28 VITALS
TEMPERATURE: 98.3 F | OXYGEN SATURATION: 99 % | BODY MASS INDEX: 27.61 KG/M2 | HEART RATE: 98 BPM | RESPIRATION RATE: 16 BRPM | WEIGHT: 161.71 LBS | SYSTOLIC BLOOD PRESSURE: 142 MMHG | HEIGHT: 64 IN | DIASTOLIC BLOOD PRESSURE: 72 MMHG

## 2020-07-28 VITALS
HEIGHT: 64 IN | RESPIRATION RATE: 18 BRPM | TEMPERATURE: 97.1 F | HEART RATE: 96 BPM | SYSTOLIC BLOOD PRESSURE: 147 MMHG | DIASTOLIC BLOOD PRESSURE: 79 MMHG | OXYGEN SATURATION: 100 % | WEIGHT: 161.6 LBS | BODY MASS INDEX: 27.59 KG/M2

## 2020-07-28 VITALS
HEIGHT: 64 IN | HEART RATE: 96 BPM | BODY MASS INDEX: 27.59 KG/M2 | SYSTOLIC BLOOD PRESSURE: 147 MMHG | OXYGEN SATURATION: 100 % | TEMPERATURE: 97.1 F | WEIGHT: 161.6 LBS | DIASTOLIC BLOOD PRESSURE: 79 MMHG | RESPIRATION RATE: 18 BRPM

## 2020-07-28 DIAGNOSIS — Z79.899 ENCOUNTER FOR LONG-TERM CURRENT USE OF HIGH RISK MEDICATION: ICD-10-CM

## 2020-07-28 DIAGNOSIS — C20 MALIGNANT NEOPLASM OF RECTUM (HCC): Chronic | ICD-10-CM

## 2020-07-28 DIAGNOSIS — N28.9 RENAL INSUFFICIENCY: ICD-10-CM

## 2020-07-28 DIAGNOSIS — C20 MALIGNANT NEOPLASM OF RECTUM (HCC): ICD-10-CM

## 2020-07-28 LAB
ALBUMIN SERPL BCP-MCNC: 3.9 G/DL (ref 3.2–4.9)
ALBUMIN/GLOB SERPL: 1.4 G/DL
ALP SERPL-CCNC: 129 U/L (ref 30–99)
ALT SERPL-CCNC: 20 U/L (ref 2–50)
ANION GAP SERPL CALC-SCNC: 11 MMOL/L (ref 7–16)
AST SERPL-CCNC: 23 U/L (ref 12–45)
BASOPHILS # BLD AUTO: 1.2 % (ref 0–1.8)
BASOPHILS # BLD: 0.06 K/UL (ref 0–0.12)
BILIRUB SERPL-MCNC: 0.5 MG/DL (ref 0.1–1.5)
BUN SERPL-MCNC: 13 MG/DL (ref 8–22)
CALCIUM SERPL-MCNC: 9.8 MG/DL (ref 8.5–10.5)
CEA SERPL-MCNC: 1.4 NG/ML (ref 0–3)
CHLORIDE SERPL-SCNC: 104 MMOL/L (ref 96–112)
CO2 SERPL-SCNC: 23 MMOL/L (ref 20–33)
CREAT SERPL-MCNC: 0.96 MG/DL (ref 0.5–1.4)
EOSINOPHIL # BLD AUTO: 0.23 K/UL (ref 0–0.51)
EOSINOPHIL NFR BLD: 4.6 % (ref 0–6.9)
ERYTHROCYTE [DISTWIDTH] IN BLOOD BY AUTOMATED COUNT: 61.1 FL (ref 35.9–50)
GLOBULIN SER CALC-MCNC: 2.7 G/DL (ref 1.9–3.5)
GLUCOSE SERPL-MCNC: 97 MG/DL (ref 65–99)
HCT VFR BLD AUTO: 41 % (ref 37–47)
HGB BLD-MCNC: 12.7 G/DL (ref 12–16)
IMM GRANULOCYTES # BLD AUTO: 0.01 K/UL (ref 0–0.11)
IMM GRANULOCYTES NFR BLD AUTO: 0.2 % (ref 0–0.9)
LYMPHOCYTES # BLD AUTO: 1.04 K/UL (ref 1–4.8)
LYMPHOCYTES NFR BLD: 20.9 % (ref 22–41)
MCH RBC QN AUTO: 26.6 PG (ref 27–33)
MCHC RBC AUTO-ENTMCNC: 31 G/DL (ref 33.6–35)
MCV RBC AUTO: 85.8 FL (ref 81.4–97.8)
MONOCYTES # BLD AUTO: 0.63 K/UL (ref 0–0.85)
MONOCYTES NFR BLD AUTO: 12.7 % (ref 0–13.4)
NEUTROPHILS # BLD AUTO: 3.01 K/UL (ref 2–7.15)
NEUTROPHILS NFR BLD: 60.4 % (ref 44–72)
NRBC # BLD AUTO: 0 K/UL
NRBC BLD-RTO: 0 /100 WBC
PLATELET # BLD AUTO: 194 K/UL (ref 164–446)
PMV BLD AUTO: 9.4 FL (ref 9–12.9)
POTASSIUM SERPL-SCNC: 4.1 MMOL/L (ref 3.6–5.5)
PROT SERPL-MCNC: 6.6 G/DL (ref 6–8.2)
RBC # BLD AUTO: 4.78 M/UL (ref 4.2–5.4)
SODIUM SERPL-SCNC: 138 MMOL/L (ref 135–145)
WBC # BLD AUTO: 5 K/UL (ref 4.8–10.8)

## 2020-07-28 PROCEDURE — 82378 CARCINOEMBRYONIC ANTIGEN: CPT

## 2020-07-28 PROCEDURE — 700111 HCHG RX REV CODE 636 W/ 250 OVERRIDE (IP)

## 2020-07-28 PROCEDURE — 85025 COMPLETE CBC W/AUTO DIFF WBC: CPT

## 2020-07-28 PROCEDURE — 700111 HCHG RX REV CODE 636 W/ 250 OVERRIDE (IP): Performed by: INTERNAL MEDICINE

## 2020-07-28 PROCEDURE — 80053 COMPREHEN METABOLIC PANEL: CPT

## 2020-07-28 PROCEDURE — 96375 TX/PRO/DX INJ NEW DRUG ADDON: CPT

## 2020-07-28 PROCEDURE — G0498 CHEMO EXTEND IV INFUS W/PUMP: HCPCS

## 2020-07-28 PROCEDURE — 96374 THER/PROPH/DIAG INJ IV PUSH: CPT

## 2020-07-28 PROCEDURE — 99214 OFFICE O/P EST MOD 30 MIN: CPT | Performed by: NURSE PRACTITIONER

## 2020-07-28 PROCEDURE — A4212 NON CORING NEEDLE OR STYLET: HCPCS

## 2020-07-28 RX ORDER — HEPARIN SODIUM (PORCINE) LOCK FLUSH IV SOLN 100 UNIT/ML 100 UNIT/ML
SOLUTION INTRAVENOUS
Status: COMPLETED
Start: 2020-07-28 | End: 2020-07-28

## 2020-07-28 RX ORDER — DEXAMETHASONE SODIUM PHOSPHATE 4 MG/ML
8 INJECTION, SOLUTION INTRA-ARTICULAR; INTRALESIONAL; INTRAMUSCULAR; INTRAVENOUS; SOFT TISSUE ONCE
Status: COMPLETED | OUTPATIENT
Start: 2020-07-28 | End: 2020-07-28

## 2020-07-28 RX ADMIN — HEPARIN 500 UNITS: 100 SYRINGE at 07:40

## 2020-07-28 RX ADMIN — FLUOROURACIL 3495 MG: 50 INJECTION, SOLUTION INTRAVENOUS at 09:14

## 2020-07-28 RX ADMIN — DEXAMETHASONE SODIUM PHOSPHATE 8 MG: 4 INJECTION, SOLUTION INTRA-ARTICULAR; INTRALESIONAL; INTRAMUSCULAR; INTRAVENOUS; SOFT TISSUE at 08:40

## 2020-07-28 ASSESSMENT — ENCOUNTER SYMPTOMS
MYALGIAS: 0
FEVER: 0
NAUSEA: 0
DIZZINESS: 0
TINGLING: 1
VOMITING: 0
CONSTIPATION: 0
HEADACHES: 0
INSOMNIA: 0
PALPITATIONS: 0
ROS GI COMMENTS: NORMAL OSTOMY OUTPUT
COUGH: 1
DIARRHEA: 0
CHILLS: 0
SHORTNESS OF BREATH: 0
ABDOMINAL PAIN: 1
WEIGHT LOSS: 0
WHEEZING: 0

## 2020-07-28 ASSESSMENT — FIBROSIS 4 INDEX
FIB4 SCORE: 2.12
FIB4 SCORE: 2.12
FIB4 SCORE: 1.76

## 2020-07-28 ASSESSMENT — PAIN SCALES - GENERAL: PAINLEVEL: NO PAIN

## 2020-07-28 NOTE — PROGRESS NOTES
Chemotherapy Verification - PRIMARY RN  C101 D1      Height = 1.626 m  Weight = 73.3 kg  BSA = 1.82 m2       Medication: fluorouracil CADD pump Dose: 1920 mg/m2  Calculated Dose: 3494.4 mg over 46 hours                             (In mg/m2, AUC, mg/kg)       I confirm this process was performed independently with the BSA and all final chemotherapy dosing calculations congruent.  Any discrepancies of 10% or greater have been addressed with the chemotherapy pharmacist. The resolution of the discrepancy has been documented in the EPIC progress notes.

## 2020-07-28 NOTE — PROGRESS NOTES
"Pharmacy Chemotherapy Verification Note:    Patient Name: Karlene Walters      Dx: Colon Ca      Protocol: 5-FU +                                     -- 10/31/17: delete Leucovorin and 5-FU push d/t neutropenia per Dr. Hardy   Fluorouracil 2400 mg/m² continuous infusion over 46-48 hours                              -- 20% reduction (1920 mg/m²) to be continued starting C28 per C.Alsop, APRN   14 day cycles for 12 cycles (adjuvant) or until disease progression or unacceptable toxicity  *Dosing Reference*   NCCN Guidelines for Colon Cancer. V.2.2015.  Jay T, et al. Eur J Cancer.1999;35(9):1343-7.     Allergies:  Oxaliplatin; Codeine; Pcn [penicillins]; Sulfa drugs; and Tape     /79   Pulse 96   Temp 36.2 °C (97.1 °F) (Temporal) Comment: HT AND W/T TRANSFERED FROM THIS AM.  Resp 18   Ht 1.626 m (5' 4.02\")   Wt 73.3 kg (161 lb 9.6 oz) Comment: V/S TRANSFERED FROM THIS AM.  LMP  (LMP Unknown)   SpO2 100%   BMI 27.72 kg/m²  Body surface area is 1.82 meters squared.    Labs 7/28/20:  ANC~ 3010 Plt = 194k   Hgb = 12.7     SCr = 0.96 mg/dL CrCl ~ 54 mL/min   AST/ALT/ALK = 23/20/129 TBili = 0.5      Drug Order   (Drug name, dose, route, IV Fluid & volume, frequency, number of doses) Cycle: 109 ([place] cycle 101)     Previous treatment: 7/14/20     Medication = Fluorouracil (5-FU)  Base Dose = 1920 mg/m²  Calc Dose: Base Dose x 1.82 m² = 3494.4 mg  Final Dose = 3495 mg  Route = CIVI   Fluid & Volume = 69.9 mL (+ 3 mL overfill = 72.9 mL)  Admin Duration = Over 46 hours (to run at 1.5 mL/hour)   Via CADD pump for home infusion      <10% difference, ok to treat with final written dose     Asha Krishnan, PharmD          "

## 2020-07-28 NOTE — PROGRESS NOTES
Pt returns to [facility] for 5FU CADD pump for rectal cancer.  Pt PORT still accessed from lab draw, brisk blood return noted, flushed per protocol.  Pt lab results w/n parameters for tx today.  Pt-given Decadron as pre-med w/ no adverse reaction.  5FU CADD pump connected to pt's PORT, all clamps opened, all connections secure, pump placed in pt's konrad-pack w/ extra batteries.  Pt left on foot in NAD.  Confirmed pt's disconnect appt.

## 2020-07-28 NOTE — PROGRESS NOTES
Chemotherapy Verification - SECONDARY RN       Height = 162.6 cm  Weight = 73.3 kg  BSA = 1.819 m2       Medication: Home infusion 5FU  Dose: 1920 mg/m2 dr  Calculated Dose: 3493.5 mg infused over 46 hours                             (In mg/m2, AUC, mg/kg)       I confirm that this process was performed independently.

## 2020-07-28 NOTE — PROGRESS NOTES
Pt ambulatory to [facility] for pre-chemo labs. Pt w/ no s/s of infection, pt has no complaints at this time.  Pt PORT accessed by Cori BOWIE w/ low profile needle using sterile technique, brisk blood return noted, labs drawn per orders, flushed per protocol.  Pt let in care of self in NAD.  Pt to return at 0800 for chemotherapy.

## 2020-07-30 ENCOUNTER — OUTPATIENT INFUSION SERVICES (OUTPATIENT)
Dept: ONCOLOGY | Facility: MEDICAL CENTER | Age: 80
End: 2020-07-30
Attending: INTERNAL MEDICINE
Payer: MEDICARE

## 2020-07-30 VITALS
OXYGEN SATURATION: 100 % | TEMPERATURE: 97.1 F | RESPIRATION RATE: 18 BRPM | HEART RATE: 89 BPM | DIASTOLIC BLOOD PRESSURE: 57 MMHG | WEIGHT: 164.24 LBS | HEIGHT: 64 IN | BODY MASS INDEX: 28.04 KG/M2 | SYSTOLIC BLOOD PRESSURE: 117 MMHG

## 2020-07-30 PROCEDURE — 700111 HCHG RX REV CODE 636 W/ 250 OVERRIDE (IP)

## 2020-07-30 PROCEDURE — 96523 IRRIG DRUG DELIVERY DEVICE: CPT

## 2020-07-30 RX ORDER — HEPARIN SODIUM (PORCINE) LOCK FLUSH IV SOLN 100 UNIT/ML 100 UNIT/ML
SOLUTION INTRAVENOUS
Status: COMPLETED
Start: 2020-07-30 | End: 2020-07-30

## 2020-07-30 RX ADMIN — HEPARIN 500 UNITS: 100 SYRINGE at 11:47

## 2020-07-30 ASSESSMENT — FIBROSIS 4 INDEX: FIB4 SCORE: 2.12

## 2020-07-30 NOTE — PROGRESS NOTES
[facility] CADD pump disconnect.  Pt reports mild nausea today.  Pt took Zofran today.  5FU CADD pump volume is 0ml.  CADD pump disconnected from NATALI port.  Port flushed with NS and brisk blood return observed.  Port flushed with NS and heparin locked.  Meade needle removed intact.  Site covered with gauze.  Confirmed next appt with pt.  Pt dc home to self care.

## 2020-08-05 ENCOUNTER — TELEPHONE (OUTPATIENT)
Dept: HEMATOLOGY ONCOLOGY | Facility: MEDICAL CENTER | Age: 80
End: 2020-08-05

## 2020-08-05 NOTE — TELEPHONE ENCOUNTER
Pt called in and wanted to see the status of having her appt schedule being completed. Pt stated that MICHAEL Martin was going to complete their appt schedule through October. Informed Pt that Mario is no longer here and spoke with MICHAEL Macias who will finish the schedule up. Pt requests that they have early appts similar to August set up. 7am labs, 7:30am to see a provider and, 8am to be infusion. Patient also likes keeping their deaccess on Thursday's at 11:45am. Pt also was wondering when they are going to see an MD in the office. Let patient know I would relay all this information to MICHAEL Macias. Patient acknowledged.

## 2020-08-11 ENCOUNTER — OUTPATIENT INFUSION SERVICES (OUTPATIENT)
Dept: ONCOLOGY | Facility: MEDICAL CENTER | Age: 80
End: 2020-08-11
Attending: INTERNAL MEDICINE
Payer: MEDICARE

## 2020-08-11 ENCOUNTER — OFFICE VISIT (OUTPATIENT)
Dept: HEMATOLOGY ONCOLOGY | Facility: MEDICAL CENTER | Age: 80
End: 2020-08-11
Payer: MEDICARE

## 2020-08-11 VITALS
SYSTOLIC BLOOD PRESSURE: 135 MMHG | DIASTOLIC BLOOD PRESSURE: 69 MMHG | WEIGHT: 161.6 LBS | HEIGHT: 64 IN | RESPIRATION RATE: 18 BRPM | BODY MASS INDEX: 27.59 KG/M2 | HEART RATE: 105 BPM | OXYGEN SATURATION: 95 % | TEMPERATURE: 97.9 F

## 2020-08-11 VITALS
HEIGHT: 63 IN | SYSTOLIC BLOOD PRESSURE: 132 MMHG | RESPIRATION RATE: 16 BRPM | BODY MASS INDEX: 28.59 KG/M2 | HEART RATE: 100 BPM | OXYGEN SATURATION: 96 % | DIASTOLIC BLOOD PRESSURE: 72 MMHG | WEIGHT: 161.38 LBS | TEMPERATURE: 97.9 F

## 2020-08-11 VITALS
BODY MASS INDEX: 27.59 KG/M2 | HEART RATE: 105 BPM | RESPIRATION RATE: 18 BRPM | DIASTOLIC BLOOD PRESSURE: 69 MMHG | WEIGHT: 161.6 LBS | OXYGEN SATURATION: 95 % | HEIGHT: 64 IN | TEMPERATURE: 97.9 F | SYSTOLIC BLOOD PRESSURE: 135 MMHG

## 2020-08-11 DIAGNOSIS — C78.7 SECONDARY MALIGNANT NEOPLASM OF LIVER (HCC): ICD-10-CM

## 2020-08-11 DIAGNOSIS — C20 MALIGNANT NEOPLASM OF RECTUM (HCC): ICD-10-CM

## 2020-08-11 DIAGNOSIS — C20 RECTAL CANCER (HCC): Chronic | ICD-10-CM

## 2020-08-11 DIAGNOSIS — Z86.711 HISTORY OF PULMONARY EMBOLUS (PE): ICD-10-CM

## 2020-08-11 DIAGNOSIS — R11.0 CHEMOTHERAPY-INDUCED NAUSEA: ICD-10-CM

## 2020-08-11 DIAGNOSIS — T45.1X5A CHEMOTHERAPY-INDUCED NAUSEA: ICD-10-CM

## 2020-08-11 LAB
ALBUMIN SERPL BCP-MCNC: 3.9 G/DL (ref 3.2–4.9)
ALBUMIN/GLOB SERPL: 1.4 G/DL
ALP SERPL-CCNC: 125 U/L (ref 30–99)
ALT SERPL-CCNC: 24 U/L (ref 2–50)
ANION GAP SERPL CALC-SCNC: 16 MMOL/L (ref 7–16)
AST SERPL-CCNC: 26 U/L (ref 12–45)
BASOPHILS # BLD AUTO: 1 % (ref 0–1.8)
BASOPHILS # BLD: 0.05 K/UL (ref 0–0.12)
BILIRUB SERPL-MCNC: 0.5 MG/DL (ref 0.1–1.5)
BUN SERPL-MCNC: 22 MG/DL (ref 8–22)
CALCIUM SERPL-MCNC: 9.8 MG/DL (ref 8.5–10.5)
CEA SERPL-MCNC: 1.4 NG/ML (ref 0–3)
CHLORIDE SERPL-SCNC: 100 MMOL/L (ref 96–112)
CO2 SERPL-SCNC: 23 MMOL/L (ref 20–33)
CREAT SERPL-MCNC: 1 MG/DL (ref 0.5–1.4)
EOSINOPHIL # BLD AUTO: 0.28 K/UL (ref 0–0.51)
EOSINOPHIL NFR BLD: 5.8 % (ref 0–6.9)
ERYTHROCYTE [DISTWIDTH] IN BLOOD BY AUTOMATED COUNT: 64.5 FL (ref 35.9–50)
GLOBULIN SER CALC-MCNC: 2.8 G/DL (ref 1.9–3.5)
GLUCOSE SERPL-MCNC: 129 MG/DL (ref 65–99)
HCT VFR BLD AUTO: 42.6 % (ref 37–47)
HGB BLD-MCNC: 13.2 G/DL (ref 12–16)
IMM GRANULOCYTES # BLD AUTO: 0.01 K/UL (ref 0–0.11)
IMM GRANULOCYTES NFR BLD AUTO: 0.2 % (ref 0–0.9)
LYMPHOCYTES # BLD AUTO: 1.33 K/UL (ref 1–4.8)
LYMPHOCYTES NFR BLD: 27.6 % (ref 22–41)
MCH RBC QN AUTO: 27.2 PG (ref 27–33)
MCHC RBC AUTO-ENTMCNC: 31 G/DL (ref 33.6–35)
MCV RBC AUTO: 87.7 FL (ref 81.4–97.8)
MONOCYTES # BLD AUTO: 0.62 K/UL (ref 0–0.85)
MONOCYTES NFR BLD AUTO: 12.9 % (ref 0–13.4)
NEUTROPHILS # BLD AUTO: 2.53 K/UL (ref 2–7.15)
NEUTROPHILS NFR BLD: 52.5 % (ref 44–72)
NRBC # BLD AUTO: 0 K/UL
NRBC BLD-RTO: 0 /100 WBC
PLATELET # BLD AUTO: 225 K/UL (ref 164–446)
PMV BLD AUTO: 10.3 FL (ref 9–12.9)
POTASSIUM SERPL-SCNC: 3.7 MMOL/L (ref 3.6–5.5)
PROT SERPL-MCNC: 6.7 G/DL (ref 6–8.2)
RBC # BLD AUTO: 4.86 M/UL (ref 4.2–5.4)
SODIUM SERPL-SCNC: 139 MMOL/L (ref 135–145)
WBC # BLD AUTO: 4.8 K/UL (ref 4.8–10.8)

## 2020-08-11 PROCEDURE — G0498 CHEMO EXTEND IV INFUS W/PUMP: HCPCS

## 2020-08-11 PROCEDURE — 85025 COMPLETE CBC W/AUTO DIFF WBC: CPT

## 2020-08-11 PROCEDURE — 82378 CARCINOEMBRYONIC ANTIGEN: CPT

## 2020-08-11 PROCEDURE — 96374 THER/PROPH/DIAG INJ IV PUSH: CPT | Mod: XU

## 2020-08-11 PROCEDURE — 96375 TX/PRO/DX INJ NEW DRUG ADDON: CPT

## 2020-08-11 PROCEDURE — A4212 NON CORING NEEDLE OR STYLET: HCPCS

## 2020-08-11 PROCEDURE — 99214 OFFICE O/P EST MOD 30 MIN: CPT | Performed by: NURSE PRACTITIONER

## 2020-08-11 PROCEDURE — 80053 COMPREHEN METABOLIC PANEL: CPT

## 2020-08-11 PROCEDURE — 700111 HCHG RX REV CODE 636 W/ 250 OVERRIDE (IP): Performed by: INTERNAL MEDICINE

## 2020-08-11 RX ORDER — DEXAMETHASONE SODIUM PHOSPHATE 4 MG/ML
8 INJECTION, SOLUTION INTRA-ARTICULAR; INTRALESIONAL; INTRAMUSCULAR; INTRAVENOUS; SOFT TISSUE ONCE
Status: DISCONTINUED | OUTPATIENT
Start: 2020-08-11 | End: 2020-08-11 | Stop reason: ALTCHOICE

## 2020-08-11 RX ORDER — DEXAMETHASONE SODIUM PHOSPHATE 4 MG/ML
8 INJECTION, SOLUTION INTRA-ARTICULAR; INTRALESIONAL; INTRAMUSCULAR; INTRAVENOUS; SOFT TISSUE ONCE
Status: COMPLETED | OUTPATIENT
Start: 2020-08-11 | End: 2020-08-11

## 2020-08-11 RX ORDER — ONDANSETRON 4 MG/1
4 TABLET, FILM COATED ORAL EVERY 4 HOURS PRN
Qty: 30 TAB | Refills: 5 | Status: SHIPPED | OUTPATIENT
Start: 2020-08-11 | End: 2021-05-19

## 2020-08-11 RX ORDER — LIDOCAINE AND PRILOCAINE 25; 25 MG/G; MG/G
CREAM TOPICAL
Qty: 30 G | Refills: 3 | Status: SHIPPED | OUTPATIENT
Start: 2020-08-11 | End: 2021-07-19

## 2020-08-11 RX ADMIN — FLUOROURACIL 3495 MG: 50 INJECTION, SOLUTION INTRAVENOUS at 08:50

## 2020-08-11 RX ADMIN — DEXAMETHASONE SODIUM PHOSPHATE 8 MG: 4 INJECTION, SOLUTION INTRA-ARTICULAR; INTRALESIONAL; INTRAMUSCULAR; INTRAVENOUS; SOFT TISSUE at 08:41

## 2020-08-11 ASSESSMENT — ENCOUNTER SYMPTOMS
DIZZINESS: 0
SHORTNESS OF BREATH: 1
PALPITATIONS: 1
COUGH: 0
HEADACHES: 0
CONSTIPATION: 0
WEIGHT LOSS: 1
FEVER: 0
ABDOMINAL PAIN: 1
VOMITING: 0
NAUSEA: 0
CHILLS: 0
DIARRHEA: 0

## 2020-08-11 ASSESSMENT — FIBROSIS 4 INDEX
FIB4 SCORE: 2.12
FIB4 SCORE: 2.12
FIB4 SCORE: 1.83

## 2020-08-11 NOTE — PROGRESS NOTES
Chemotherapy Verification - PRIMARY RN      Height = 163 cm  Weight = 73.3 kg  BSA = 1.82 m2       Medication: Fluoruracil  Dose: 1,920 mg/m2    Calculated Dose: 3,498 mg                             (In mg/m2, AUC, mg/kg)     I confirm this process was performed independently with the BSA and all final chemotherapy dosing calculations congruent.  Any discrepancies of 10% or greater have been addressed with the chemotherapy pharmacist. The resolution of the discrepancy has been documented in the EPIC progress notes.

## 2020-08-11 NOTE — PROGRESS NOTES
Chemotherapy Verification - SECONDARY RN       Height = 163 cm  Weight = 73.3 kg  BSA = 1.82 m2       Medication: Adrucil  Dose: 1,920 mg/m2  Calculated Dose: 3,494.4 mg over 46 hours                             (In mg/m2, AUC, mg/kg)         I confirm that this process was performed independently.

## 2020-08-11 NOTE — PROGRESS NOTES
Pt returned to [facility] for Fluorouracil 46 hour pump connection. Left chest wall port accessed at earlier appointment. Flushed easily, paolo briskly. Pt's lab results reviewed, within treatable parameters. Pre-med given as ordered. Fluorouracil 46 hour CADD pump connected.  Pt left [facility] in NAD. Aware of next appointment time.

## 2020-08-11 NOTE — PROGRESS NOTES
Subjective:      Karlene Walters is a 80 y.o. female who presents for pre-chemo for Rectal Cancer.        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  She presents unaccompanied for today's visit.  Patient is scheduled to receive a single agent 5-FU today.    Patient with a long history of rectal carcinoma initially diagnosed in 2012.  She was found to have metastatic disease to liver and lung in 2015.  She is status post liver ablation as well as has received Y 90 in the past.  She was on XELOX initially but had allergic reaction to oxaliplatin and been switched to 5-FU.  She has been on single agent 5-FU for quite some time.  In January 2020 patient was changed to every 3-week cycle at cycle #91 and then changed to every 4-week interval in May 2020.  Was at that time there is noted to have groin disease within the liver and was seen by Dr. West.  Patient did undergo surgery in hopes of resection of the tumor but it was felt to be too close to the hilum and therefore she did undergo ablation instead.  She has been put back on single agent 5-FU at every 2-week interval.    She has been doing well with restarted chemotherapy.  She did notice increased redness of her hands initially which is slowly improving.  Last CT scan completed on June 29, 2020 shows no new liver lesions and no new metastasis within the abdomen.  She is monitored closely and is scheduled for a follow-up CT scan in October 5, 2020.  She is due to follow-up with surgeon on October 6, 2020.  She states that her appetite is fair but she is also attempting to eat better, therefore she has lost approximately 3 pounds in the last 2 weeks.  She does have mild fatigue.  She does have chronic shortness of breath which is not worsening.  She denies coughing.  She does have chronic heart palpitations and states is not worsening, and only noted during exertion.  She does continue to have some abdominal pain,  "where she describes it as soreness from the surgery.  She did have slight tenderness to palpation on examination today.    I did review labs available which was CBC with patient today.      Allergies   Allergen Reactions   • Oxaliplatin Anaphylaxis   • Codeine      \"gets drunk\"   • Pcn [Penicillins] Itching     itching   • Sulfa Drugs Itching     itching   • Tape Rash     PAPER TAPE OK     Current Outpatient Medications on File Prior to Visit   Medication Sig Dispense Refill   • Tiotropium Bromide-Olodaterol (STIOLTO RESPIMAT) 2.5-2.5 MCG/ACT Aero Soln Take 2 Puffs by mouth every day. 1 Inhaler 11   • albuterol 108 (90 Base) MCG/ACT Aero Soln inhalation aerosol Inhale 2 Puffs by mouth every 6 hours as needed for Shortness of Breath. 8.5 g 11   • potassium chloride ER (KLOR-CON) 10 MEQ tablet Take 1 Tab by mouth every day. 100 Tab 3   • rivaroxaban (XARELTO) 10 MG Tab tablet Take 1 Tab by mouth every day. (Patient taking differently: Take 10 mg by mouth every day at 6 PM.) 90 Tab 1   • lidocaine-prilocaine (EMLA) 2.5-2.5 % Cream APPLY A THICK FILM OVER THE PORT ACCESS SITE PRIOR TO ACCESS 30 g 3   • ondansetron (ZOFRAN) 4 MG Tab tablet Take 1 Tab by mouth as needed. 30 Tab 3   • metronidazole (METROCREAM) 0.75 % cream Apply 1 Each to affected area(s) 2 times a day.     • cyanocobalamin (VITAMIN B-12) 500 MCG Tab Take 1,000 mcg by mouth every day.     • Multiple Vitamins-Minerals (CENTRUM SILVER PO) Take 1 Tab by mouth every day.     • Cholecalciferol (VITAMIN D3) 400 UNITS CAPS Take 1 Cap by mouth every day.     • loratadine (CLARITIN) 10 MG TABS Take 10 mg by mouth every day. Indications: Hayfever       No current facility-administered medications on file prior to visit.        Review of Systems   Constitutional: Positive for malaise/fatigue (mild ) and weight loss. Negative for chills and fever.   Respiratory: Positive for shortness of breath (chronic). Negative for cough.    Cardiovascular: Positive for " "palpitations (chronic and not worsening with exertion). Negative for chest pain.   Gastrointestinal: Positive for abdominal pain (soreness from surgery). Negative for constipation, diarrhea, nausea and vomiting.   Genitourinary: Negative for dysuria.   Skin:        Redness and tenderness to the hands post chemo   Neurological: Negative for dizziness and headaches.          Objective:     /72 (BP Location: Left arm, Patient Position: Sitting, BP Cuff Size: Adult)   Pulse 100   Temp 36.6 °C (97.9 °F) (Temporal)   Resp 16   Ht 1.6 m (5' 2.99\")   Wt 73.2 kg (161 lb 6 oz)   LMP  (LMP Unknown)   SpO2 96%   BMI 28.59 kg/m²      Physical Exam  Vitals signs reviewed.   Constitutional:       General: She is not in acute distress.     Appearance: Normal appearance. She is not diaphoretic.   HENT:      Head: Normocephalic and atraumatic.      Mouth/Throat:      Mouth: Mucous membranes are dry.      Pharynx: Oropharynx is clear. No oropharyngeal exudate or posterior oropharyngeal erythema.   Cardiovascular:      Rate and Rhythm: Normal rate.      Pulses: Normal pulses.      Heart sounds: Normal heart sounds. No murmur. No friction rub. No gallop.    Pulmonary:      Effort: Pulmonary effort is normal. No respiratory distress.      Breath sounds: Normal breath sounds. No wheezing.   Abdominal:      General: Bowel sounds are normal. There is no distension.      Palpations: Abdomen is soft.      Tenderness: There is abdominal tenderness (post surgical tenderness in the RUQ - incision CDI).   Musculoskeletal: Normal range of motion.         General: No swelling or tenderness.   Skin:     General: Skin is warm and dry.      Findings: Erythema (mild to her hands post chemo) present.   Neurological:      Mental Status: She is alert and oriented to person, place, and time.   Psychiatric:         Mood and Affect: Mood normal.         Behavior: Behavior normal.         Results for KANG HALL (MRN [id number]) as of " 8/11/2020 16:51   Ref. Range 8/11/2020 07:18   WBC Latest Ref Range: 4.8 - 10.8 K/uL 4.8   RBC Latest Ref Range: 4.20 - 5.40 M/uL 4.86   Hemoglobin Latest Ref Range: 12.0 - 16.0 g/dL 13.2   Hematocrit Latest Ref Range: 37.0 - 47.0 % 42.6   MCV Latest Ref Range: 81.4 - 97.8 fL 87.7   MCH Latest Ref Range: 27.0 - 33.0 pg 27.2   MCHC Latest Ref Range: 33.6 - 35.0 g/dL 31.0 (L)   RDW Latest Ref Range: 35.9 - 50.0 fL 64.5 (H)   Platelet Count Latest Ref Range: 164 - 446 K/uL 225   MPV Latest Ref Range: 9.0 - 12.9 fL 10.3   Neutrophils-Polys Latest Ref Range: 44.00 - 72.00 % 52.50   Neutrophils (Absolute) Latest Ref Range: 2.00 - 7.15 K/uL 2.53   Lymphocytes Latest Ref Range: 22.00 - 41.00 % 27.60   Lymphs (Absolute) Latest Ref Range: 1.00 - 4.80 K/uL 1.33   Monocytes Latest Ref Range: 0.00 - 13.40 % 12.90   Monos (Absolute) Latest Ref Range: 0.00 - 0.85 K/uL 0.62   Eosinophils Latest Ref Range: 0.00 - 6.90 % 5.80   Eos (Absolute) Latest Ref Range: 0.00 - 0.51 K/uL 0.28   Basophils Latest Ref Range: 0.00 - 1.80 % 1.00   Baso (Absolute) Latest Ref Range: 0.00 - 0.12 K/uL 0.05   Immature Granulocytes Latest Ref Range: 0.00 - 0.90 % 0.20   Immature Granulocytes (abs) Latest Ref Range: 0.00 - 0.11 K/uL 0.01   Nucleated RBC Latest Units: /100 WBC 0.00   NRBC (Absolute) Latest Units: K/uL 0.00   Sodium Latest Ref Range: 135 - 145 mmol/L 139   Potassium Latest Ref Range: 3.6 - 5.5 mmol/L 3.7   Chloride Latest Ref Range: 96 - 112 mmol/L 100   Co2 Latest Ref Range: 20 - 33 mmol/L 23   Anion Gap Latest Ref Range: 7.0 - 16.0  16.0   Glucose Latest Ref Range: 65 - 99 mg/dL 129 (H)   Bun Latest Ref Range: 8 - 22 mg/dL 22   Creatinine Latest Ref Range: 0.50 - 1.40 mg/dL 1.00   GFR If  Latest Ref Range: >60 mL/min/1.73 m 2 >60   GFR If Non  Latest Ref Range: >60 mL/min/1.73 m 2 53 (A)   Calcium Latest Ref Range: 8.5 - 10.5 mg/dL 9.8   AST(SGOT) Latest Ref Range: 12 - 45 U/L 26   ALT(SGPT) Latest Ref  Range: 2 - 50 U/L 24   Alkaline Phosphatase Latest Ref Range: 30 - 99 U/L 125 (H)   Total Bilirubin Latest Ref Range: 0.1 - 1.5 mg/dL 0.5   Albumin Latest Ref Range: 3.2 - 4.9 g/dL 3.9   Total Protein Latest Ref Range: 6.0 - 8.2 g/dL 6.7   Globulin Latest Ref Range: 1.9 - 3.5 g/dL 2.8   A-G Ratio Latest Units: g/dL 1.4   Carcinoembryonic Antigen Latest Ref Range: 0.0 - 3.0 ng/mL 1.4          Assessment/Plan:     1. Rectal cancer (HCC)  rivaroxaban (XARELTO) 10 MG Tab tablet    ondansetron (ZOFRAN) 4 MG Tab tablet    lidocaine-prilocaine (EMLA) 2.5-2.5 % Cream   2. Secondary malignant neoplasm of liver (HCC)  rivaroxaban (XARELTO) 10 MG Tab tablet    lidocaine-prilocaine (EMLA) 2.5-2.5 % Cream   3. History of pulmonary embolus (PE)  rivaroxaban (XARELTO) 10 MG Tab tablet   4. Chemotherapy-induced nausea         Plan  1.  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  She recently underwent liver ablation to a progressive tumor in June 2020.  She is doing well post procedure with some mild tenderness.  Clinically she is very stable.  I did review her CBC which was available to the patient at the time of her appointment, however I have also reviewed her CMP and CEA and labs are appropriate to proceed with treatment as planned.    2.  Patient with history of pulmonary embolism currently on low-dose daily Xarelto at 10 mg daily.  Refill has been provided to patient today per her request.    3.  Refill for Zofran for chemotherapy-induced nausea vomiting has been provided today.  She states that she takes this around the first few days after treatment with positive results.    4.  Refill for EMLA cream provided the patient today.    5.  We will continue to monitor patient closely at this time, and she will be due for follow-up in the clinic in 2 weeks or sooner if needed.

## 2020-08-11 NOTE — PROGRESS NOTES
Pt presented to [facility] for port acces for chemo with pre-treatment lab draw. Left chest wall port accessed in sterile fashion. Flushed easily, paolo briskly. CBC, CMP and CEA drawn and sent to lab.Port flushed with NS. Pt left [facility] in NAD to go to Dr. Steele's office. Will return at 0800 for Fluorouracil 46 hour pump connection.

## 2020-08-11 NOTE — PROGRESS NOTES
"Pharmacy Chemotherapy Verification    Patient Name: Karlene Walters   Dx: Colon CA        Cycle 110 ([place] cycle 102 )    Previous treatment: 7/28/20    Protocol: 5-FU     IV over 2 hours on Day 1, followed by    IVP on Day 1, followed by                               -- 10/31/17: delete Leucovorin and 5-FU push d/t neutropenia per Dr. Hardy   Fluorouracil  continuous infusion over 46-48 hours                              -- 20% reduction (1920 mg/m²) to be continued starting C28 per C Alsop APRN    for 12 cycles (adjuvant) or until disease progression or unacceptable toxicity - q28 days per MD orders for tolerance  NCCN Guidelines for Colon Cancer. V.2.2015.  Jay T, et al. Eur J Cancer.1999;35(9):1343-7.      Allergies: Codeine; Oxaliplatin; Pcn; Sulfa drugs; and Tape     /69   Pulse (!) 105   Temp 36.6 °C (97.9 °F) (Temporal)   Resp 18   Ht 1.63 m (5' 4.17\")   Wt 73.3 kg (161 lb 9.6 oz)   LMP  (LMP Unknown)   SpO2 95%   BMI 27.59 kg/m²  Body surface area is 1.82 meters squared.     All lab results 7/28/20 within treatment parameters.     Fluorouracil (5-FU) 1920 mg/m² x 1.82 m² = 3494 mg              <10% difference, okay to treat with final dose = 3495 mg CIVI   Via CADD pump for home infusion over 46 hours @ 1.5 mL/hr    Nakia Arroyo, PharmD                            "

## 2020-08-11 NOTE — PROGRESS NOTES
"Pharmacy Chemotherapy Verification Note:    Patient Name: Karlene Walters      Dx: Colon Ca      Protocol: 5-FU +        *Dosing Reference*                                    -- 10/31/17: delete Leucovorin and 5-FU push d/t neutropenia per Dr. Hardy   Fluorouracil 2400 mg/m² continuous infusion over 46-48 hours                              -- 20% reduction (1920 mg/m²) to be continued starting C28 per C Alsop APRN   14 day cycles for 12 cycles (adjuvant) or until disease progression or unacceptable toxicity    NCCN Guidelines for Colon Cancer. V.2.2015.  Jay GROVER, et al. Eur J Cancer.1999;35(9):1343-7.     Allergies:  Oxaliplatin; Codeine; Pcn [penicillins]; Sulfa drugs; and Tape     /69   Pulse (!) 105   Temp 36.6 °C (97.9 °F) (Temporal)   Resp 18   Ht 1.63 m (5' 4.17\")   Wt 73.3 kg (161 lb 9.6 oz)   LMP  (LMP Unknown)   SpO2 95%   BMI 27.59 kg/m²  Body surface area is 1.82 meters squared.    Labs 8/11/20  ANC~ 2530 Plt = 225k   Hgb = 13.2     SCr = 1 mg/dL CrCl ~ 51.6 mL/min   LFT's = 26/24/125 TBili = 0.5      Drug Order   (Drug name, dose, route, IV Fluid & volume, frequency, number of doses) Cycle: 110 ([place] cycle 102)     Previous treatment: 7/28/20     Medication = Fluorouracil (5-FU)  Base Dose = 1920 mg/m²  Calc Dose: Base Dose x 1.82 m² = 3494.4 mg  Final Dose = 3495 mg  Route = CIVI via CADD pump  Fluid & Volume = 69.9 mL (+ 3 mL overfill = 72.9 mL)  Admin Duration = Over 46 hours (to run at 1.5 mL/hour)   via CADD pump       <10% difference, ok to treat with final written dose     By my signature below, I confirm this process was performed independently with the BSA and all final chemotherapy dosing calculations congruent. I have reviewed the above chemotherapy order and that my calculation of the final dose and BSA (when applicable) corroborate those calculations of the  pharmacist.     Demetrius Maldonado, PharmD    "

## 2020-08-13 ENCOUNTER — OUTPATIENT INFUSION SERVICES (OUTPATIENT)
Dept: ONCOLOGY | Facility: MEDICAL CENTER | Age: 80
End: 2020-08-13
Attending: INTERNAL MEDICINE
Payer: MEDICARE

## 2020-08-13 VITALS
DIASTOLIC BLOOD PRESSURE: 65 MMHG | HEART RATE: 95 BPM | SYSTOLIC BLOOD PRESSURE: 127 MMHG | RESPIRATION RATE: 17 BRPM | TEMPERATURE: 97.5 F | OXYGEN SATURATION: 98 %

## 2020-08-13 DIAGNOSIS — C20 MALIGNANT NEOPLASM OF RECTUM (HCC): ICD-10-CM

## 2020-08-13 PROCEDURE — 96523 IRRIG DRUG DELIVERY DEVICE: CPT

## 2020-08-13 PROCEDURE — 700111 HCHG RX REV CODE 636 W/ 250 OVERRIDE (IP)

## 2020-08-13 RX ORDER — HEPARIN SODIUM (PORCINE) LOCK FLUSH IV SOLN 100 UNIT/ML 100 UNIT/ML
500 SOLUTION INTRAVENOUS PRN
Status: DISCONTINUED | OUTPATIENT
Start: 2020-08-13 | End: 2020-08-13 | Stop reason: HOSPADM

## 2020-08-13 RX ORDER — HEPARIN SODIUM (PORCINE) LOCK FLUSH IV SOLN 100 UNIT/ML 100 UNIT/ML
SOLUTION INTRAVENOUS
Status: COMPLETED
Start: 2020-08-13 | End: 2020-08-13

## 2020-08-13 RX ADMIN — HEPARIN SODIUM (PORCINE) LOCK FLUSH IV SOLN 100 UNIT/ML 500 UNITS: 100 SOLUTION at 11:46

## 2020-08-13 RX ADMIN — HEPARIN 500 UNITS: 100 SYRINGE at 11:46

## 2020-08-13 NOTE — PROGRESS NOTES
Pt presented to infusion center for 5FU pump de-access. Pump had 0 ml in reservoir and total amount delivered. Pump disconnected, cleaned and returned to pharmacy with konrad pack in pt's labeled bag. Port flushed per protocol, brisk blood return observed, heparinized and de-accessed with needle intact, gauze dressing placed. Pt has next appt, left on foot to self care.

## 2020-08-20 NOTE — PROGRESS NOTES
"08/24/20    Subjective    Chief Complaint:  Rectal cancer metastatic to liver and lung    HPI:  80 female on maintenance 5FU x many years with history summarized in a multitude of notes in her medical record. Initial diagnosis was 2012 with lung and liver metastases diagnosed in 2015. KRAS mutated. Allergic to Oxaliplatin. Has had liver ablations remotely and again recently (5/28/20). On chronic Xarelto 10 mg for h/o PE.     ROS:    Constitutional: No weight loss  Skin: No rash or jaundice  HENT: No change in eyesight or hearing  Cardiovascular:No chest pain or arrythmia  Respiratory:SOB due to all the smoke - using inhalers  GI:No nausea, vomiting, diarrhea, constipation  :No dysuria or frequency  Musculoskeletal:No bone or joint pain  Neuro:No sx's of neuropathy  Psych: No complaints    PMH:      Allergies   Allergen Reactions   • Oxaliplatin Anaphylaxis   • Codeine      \"gets drunk\"   • Pcn [Penicillins] Itching     itching   • Sulfa Drugs Itching     itching   • Tape Rash     PAPER TAPE OK       Past Medical History:   Diagnosis Date   • Adverse effect of anesthesia     elevated BP postop   • Anesthesia    • Arrhythmia     occasional   • Blood clotting disorder (HCC) 08/2015    bilat PE    • Blood transfusion 1957   • Breath shortness     pt reports d/t chemo treatment; no O2; with moderate exertion; no c/o at this time   • Cancer (HCC) 09/2012    rectal cancer,  Liver CA-2015   • CATARACT     removed b/l   • Chemotherapy-induced neutropenia (HCC) 9/28/2016   • Chickenpox     history of   • Colon cancer (HCC)    • Dental disorder     dentures UPPERS AND LOWERS   • Elevated alkaline phosphatase level 11/15/2017   • Emphysema of lung (HCC)     COPD   • Fall    • [place] measles     history of   • Heart murmur     as a child   • Hemorrhagic disorder (HCC)     on Xarelto    • High cholesterol    • Hypercholesteremia    • Hypertension     no meds currently    • Ileostomy in place (HCC)    • Ileostomy in place (HCC)  "   • Indigestion    • Influenza     history of   • Lightheadedness 9/17/2015   • Mumps     history of   • Obstruction     ileostomy   • Other specified symptom associated with female genital organs     HYSTERECTOMY 1993   • Pneumonia 05/2017    tx'd with antibx   • Pulmonary embolism (HCC)    • 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)    • Renal insufficiency 3/14/2016   • Restless legs 5/6/2020   • Routine general medical examination at a health care facility 3/14/2016    3/14/16   • Seasonal allergies    • Swelling of both ankles     Lasix PRN   • Tonsillitis         Past Surgical History:   Procedure Laterality Date   • HEPATIC ABLATION  5/28/2020    Procedure: ABLATION, LESION, LIVER-TRISEGMENTECTOMY, MICROWAVE ABLATION, INTRA OPERATIVE ULTRA SOUND, SIMPLE RESECTED / REPAIR OF DIAPHRAGM;  Surgeon: Kit West M.D.;  Location: SURGERY [facility];  Service: General   • HEPATIC ABLATION LAPAROSCOPIC  11/15/2018    Procedure: HEPATIC ABLATION LAPAROSCOPIC.- SEGMENT 7/8;  Surgeon: Kit West M.D.;  Location: SURGERY [facility];  Service: General   • COLONOSCOPY WITH BIOPSY  8/23/2015    Procedure: COLONOSCOPY WITH BIOPSY;  Surgeon: Wilbur Glasgow M.D.;  Location: ENDOSCOPY [facility];  Service:    • HEPATIC ABLATION LAPAROSCOPIC  7/6/2015    Procedure: HEPATIC ABLATION LAPAROSCOPIC.;  Surgeon: Kit West M.D.;  Location: SURGERY [facility];  Service:    • CATH PLACEMENT Left 7/6/2015    Procedure: CATH PLACEMENT CEPHALIC POWERPORT;  Surgeon: Kit West M.D.;  Location: SURGERY [facility];  Service:    • FISTULA IN ANO REPAIR  1/28/2015    Performed by Kolton Montgomery M.D. at SURGERY [facility]   • PERINEAL PROCEDURE  1/28/2015    Performed by Kolton Montgomery M.D. at SURGERY [facility]   • FISTULA IN ANO REPAIR  10/15/2014    Performed by Kolton Montgomery M.D. at [facility]   • PERINEAL PROCEDURE  10/15/2014     Performed by Kolton Montgomery M.D. at SURGERY [facility]   • IRRIGATION & DEBRIDEMENT GENERAL  2/19/2014    Performed by Kolton Montgomery M.D. at SURGERY [facility]   • FLAP GRAFT  2/19/2014    Performed by Kolton Montgomery M.D. at [facility]   • PERINEAL PROCEDURE  12/18/2013    Performed by Kolton Montgomery M.D. at [facility]   • MYOCUTANEOUS FLAP  12/18/2013    Performed by Kolton Montgomery M.D. at [facility]   • IRRIGATION & DEBRIDEMENT GENERAL  10/23/2013    Performed by Kolton Montgomery M.D. at [facility]   • FISTULA IN ANO REPAIR  9/4/2013    Performed by Kolton Montgomery M.D. at [facility]   • FLAP ROTATION  9/4/2013    Performed by Kolton Montgomery M.D. at [facility]   • RECOVERY  8/26/2013    Performed by Cath-Recovery Surgery at [facility] SAME DAY [facility]   • BIOPSY GENERAL  7/17/2013    Performed by Kolton Montgomery M.D. at SURGERY [facility]   • PROCTOSCOPY  7/17/2013    Performed by Kolton Montgomery M.D. at [facility]   • FISTULA IN ANO REPAIR  2/27/2013    Performed by Kolton Montgomery M.D. at [facility]   • SIGMOIDOSCOPY  2/27/2013    Performed by Kolton Montgomery M.D. at [facility]   • LAPAROSCOPY  10/8/2012    Performed by Kolton Montgomery M.D. at [facility]   • ILEO LOOP DIVERSION  10/8/2012    Performed by Kolton Montgomery M.D. at [facility]   • COLECTOMY  9/26/2012    Performed by Kolton Montgomery M.D. at [facility]   • COLONOSCOPY FLEX W/ENDO US  9/20/2012    Performed by Harshil Bolton M.D. at [facility]   • OTHER  2009    left eye torn retina repair   • CATARACT EXTRACTION WITH IOL  2004    bilateral   • ABDOMINAL HYSTERECTOMY TOTAL  1983   • CYST EXCISION  1972    ovarian   • COLON RESECTION     • EYE SURGERY      cataracts   • HYSTERECTOMY LAPAROSCOPY     • PB REMV 2ND CATARACT,CORN-SCLER SECTN     • TONSILLECTOMY          Medications:    Current  Outpatient Medications on File Prior to Visit   Medication Sig Dispense Refill   • rivaroxaban (XARELTO) 10 MG Tab tablet Take 1 Tab by mouth every day. 30 Tab 5   • ondansetron (ZOFRAN) 4 MG Tab tablet Take 1 Tab by mouth every four hours as needed. 30 Tab 5   • lidocaine-prilocaine (EMLA) 2.5-2.5 % Cream Apply to port 1 hour prior to access of port and cover with plastic wrap. 30 g 3   • Tiotropium Bromide-Olodaterol (STIOLTO RESPIMAT) 2.5-2.5 MCG/ACT Aero Soln Take 2 Puffs by mouth every day. 1 Inhaler 11   • albuterol 108 (90 Base) MCG/ACT Aero Soln inhalation aerosol Inhale 2 Puffs by mouth every 6 hours as needed for Shortness of Breath. 8.5 g 11   • potassium chloride ER (KLOR-CON) 10 MEQ tablet Take 1 Tab by mouth every day. 100 Tab 3   • metronidazole (METROCREAM) 0.75 % cream Apply 1 Each to affected area(s) 2 times a day.     • cyanocobalamin (VITAMIN B-12) 500 MCG Tab Take 1,000 mcg by mouth every day.     • Multiple Vitamins-Minerals (CENTRUM SILVER PO) Take 1 Tab by mouth every day.     • Cholecalciferol (VITAMIN D3) 400 UNITS CAPS Take 1 Cap by mouth every day.     • loratadine (CLARITIN) 10 MG TABS Take 10 mg by mouth every day. Indications: Hayfever       No current facility-administered medications on file prior to visit.        Social History     Tobacco Use   • Smoking status: Never Smoker   • Smokeless tobacco: Never Used   Substance Use Topics   • Alcohol use: No        Family History   Problem Relation Age of Onset   • Heart Disease Mother    • Heart Failure Mother    • Hypertension Mother    • Hyperlipidemia Mother    • Cancer Mother    • Cancer Maternal Aunt 60        breast ca   • Lung Disease Brother         COPD/Emphysema/Smoker   • Cancer Brother         prostate cancer   • Alcohol/Drug Brother         Alcohol   • Kidney Disease Father         renal failure        Objective    Vitals:    /80 (BP Location: Right arm, Patient Position: Sitting, BP Cuff Size: Adult)   Pulse 100    "Temp 36.7 °C (98 °F) (Temporal)   Resp 16   Ht 1.626 m (5' 4\")   Wt 73.9 kg (162 lb 14.7 oz)   LMP  (LMP Unknown)   SpO2 94%   BMI 27.97 kg/m²     Physical Exam:    Appears well-developed and well-nourished. No distress.    Head -  Normocephalic .   Eyes - Pupils are equal, round, and reactive to light. Conjunctivae and EOM are normal. No scleral icterus.   Ears - normal hearing  Mouth - Throat: Oropharynx is clear and moist. No oropharyngeal exudate  Neck - Normal range of motion. Neck supple. No thyromegaly  Cardiovascular - Normal rate, regular rhythm, normal heart sounds and intact distal pulses. No  gallop, murmur or rub  Pulmonary - Normal breath sounds.  No wheeze, rales or rhonci  Breast - Not examined  Abdominal -Liver percusses down a few cm. Multiple scars. Colostomy right lower quadrant. Minimal tenderness over liver.   Extremities-  No edema or tenderness.    Nodes - No submental, submandibular, preauricular, cervical, axillary or inguinal adenopathy.    Neurological -   Alert and oriented to person, place, and time. No focal findings  Skin - Skin is warm and dry. No rash noted. Not diaphoretic. No erythema. No pallor. No jaundice   Psychiatric -  Normal mood and affect.    Labs:      AssessmentResults for KANG HALL (MRN [id number])    Ref. Range 8/11/2020 07:18   WBC Latest Ref Range: 4.8 - 10.8 K/uL 4.8   RBC Latest Ref Range: 4.20 - 5.40 M/uL 4.86   Hemoglobin Latest Ref Range: 12.0 - 16.0 g/dL 13.2   Hematocrit Latest Ref Range: 37.0 - 47.0 % 42.6   MCV Latest Ref Range: 81.4 - 97.8 fL 87.7   MCH Latest Ref Range: 27.0 - 33.0 pg 27.2   MCHC Latest Ref Range: 33.6 - 35.0 g/dL 31.0 (L)   RDW Latest Ref Range: 35.9 - 50.0 fL 64.5 (H)   Platelet Count Latest Ref Range: 164 - 446 K/uL 225      Ref. Range 8/11/2020 07:18   Sodium Latest Ref Range: 135 - 145 mmol/L 139   Potassium Latest Ref Range: 3.6 - 5.5 mmol/L 3.7   Chloride Latest Ref Range: 96 - 112 mmol/L 100   Co2 Latest Ref Range: 20 " - 33 mmol/L 23   Anion Gap Latest Ref Range: 7.0 - 16.0  16.0   Glucose Latest Ref Range: 65 - 99 mg/dL 129 (H)   Bun Latest Ref Range: 8 - 22 mg/dL 22   Creatinine Latest Ref Range: 0.50 - 1.40 mg/dL 1.00   GFR If  Latest Ref Range: >60 mL/min/1.73 m 2 >60   GFR If Non  Latest Ref Range: >60 mL/min/1.73 m 2 53 (A)   Calcium Latest Ref Range: 8.5 - 10.5 mg/dL 9.8   AST(SGOT) Latest Ref Range: 12 - 45 U/L 26   ALT(SGPT) Latest Ref Range: 2 - 50 U/L 24   Alkaline Phosphatase Latest Ref Range: 30 - 99 U/L 125 (H)   Total Bilirubin Latest Ref Range: 0.1 - 1.5 mg/dL 0.5   Albumin Latest Ref Range: 3.2 - 4.9 g/dL 3.9   Total Protein Latest Ref Range: 6.0 - 8.2 g/dL 6.7   Globulin Latest Ref Range: 1.9 - 3.5 g/dL 2.8   A-G Ratio Latest Units: g/dL 1.4      Ref. Range 6/29/2020 08:52 7/14/2020 07:20 7/28/2020 07:35 8/11/2020 07:18   Carcinoembryonic Antigen Latest Ref Range: 0.0 - 3.0 ng/mL 1.4 1.5 1.4 1.4       Imp:    Visit Diagnosis:    1. Malignant neoplasm of rectum (HCC)     2. Secondary malignant neoplasm of liver (HCC)           Plan:  Continue same for now. Chemo tomorrow. 2 weeks with same.     Lucho Steele M.D.

## 2020-08-24 ENCOUNTER — OFFICE VISIT (OUTPATIENT)
Dept: HEMATOLOGY ONCOLOGY | Facility: MEDICAL CENTER | Age: 80
End: 2020-08-24
Payer: MEDICARE

## 2020-08-24 VITALS
HEART RATE: 100 BPM | TEMPERATURE: 98 F | SYSTOLIC BLOOD PRESSURE: 114 MMHG | RESPIRATION RATE: 16 BRPM | WEIGHT: 162.92 LBS | BODY MASS INDEX: 27.81 KG/M2 | DIASTOLIC BLOOD PRESSURE: 80 MMHG | HEIGHT: 64 IN | OXYGEN SATURATION: 94 %

## 2020-08-24 DIAGNOSIS — C78.7 SECONDARY MALIGNANT NEOPLASM OF LIVER (HCC): Chronic | ICD-10-CM

## 2020-08-24 DIAGNOSIS — C20 MALIGNANT NEOPLASM OF RECTUM (HCC): Chronic | ICD-10-CM

## 2020-08-24 PROCEDURE — 99213 OFFICE O/P EST LOW 20 MIN: CPT | Performed by: INTERNAL MEDICINE

## 2020-08-24 ASSESSMENT — FIBROSIS 4 INDEX: FIB4 SCORE: 1.89

## 2020-08-25 ENCOUNTER — OUTPATIENT INFUSION SERVICES (OUTPATIENT)
Dept: ONCOLOGY | Facility: MEDICAL CENTER | Age: 80
End: 2020-08-25
Attending: INTERNAL MEDICINE
Payer: MEDICARE

## 2020-08-25 VITALS
OXYGEN SATURATION: 97 % | DIASTOLIC BLOOD PRESSURE: 78 MMHG | WEIGHT: 162.7 LBS | SYSTOLIC BLOOD PRESSURE: 133 MMHG | RESPIRATION RATE: 18 BRPM | TEMPERATURE: 97.3 F | HEART RATE: 72 BPM | BODY MASS INDEX: 27.78 KG/M2 | HEIGHT: 64 IN

## 2020-08-25 VITALS
TEMPERATURE: 97.3 F | DIASTOLIC BLOOD PRESSURE: 78 MMHG | OXYGEN SATURATION: 97 % | BODY MASS INDEX: 27.78 KG/M2 | WEIGHT: 162.7 LBS | RESPIRATION RATE: 18 BRPM | HEIGHT: 64 IN | HEART RATE: 72 BPM | SYSTOLIC BLOOD PRESSURE: 133 MMHG

## 2020-08-25 DIAGNOSIS — C20 MALIGNANT NEOPLASM OF RECTUM (HCC): ICD-10-CM

## 2020-08-25 LAB
ALBUMIN SERPL BCP-MCNC: 3.9 G/DL (ref 3.2–4.9)
ALBUMIN/GLOB SERPL: 1.4 G/DL
ALP SERPL-CCNC: 117 U/L (ref 30–99)
ALT SERPL-CCNC: 26 U/L (ref 2–50)
ANION GAP SERPL CALC-SCNC: 14 MMOL/L (ref 7–16)
ANISOCYTOSIS BLD QL SMEAR: ABNORMAL
AST SERPL-CCNC: 33 U/L (ref 12–45)
BASOPHILS # BLD AUTO: 0.8 % (ref 0–1.8)
BASOPHILS # BLD: 0.04 K/UL (ref 0–0.12)
BILIRUB SERPL-MCNC: 0.6 MG/DL (ref 0.1–1.5)
BUN SERPL-MCNC: 15 MG/DL (ref 8–22)
CALCIUM SERPL-MCNC: 9.6 MG/DL (ref 8.5–10.5)
CEA SERPL-MCNC: 1.3 NG/ML (ref 0–3)
CHLORIDE SERPL-SCNC: 103 MMOL/L (ref 96–112)
CO2 SERPL-SCNC: 23 MMOL/L (ref 20–33)
COMMENT 1642: NORMAL
CREAT SERPL-MCNC: 1.02 MG/DL (ref 0.5–1.4)
EOSINOPHIL # BLD AUTO: 0.24 K/UL (ref 0–0.51)
EOSINOPHIL NFR BLD: 5 % (ref 0–6.9)
ERYTHROCYTE [DISTWIDTH] IN BLOOD BY AUTOMATED COUNT: 68 FL (ref 35.9–50)
GLOBULIN SER CALC-MCNC: 2.8 G/DL (ref 1.9–3.5)
GLUCOSE SERPL-MCNC: 109 MG/DL (ref 65–99)
HCT VFR BLD AUTO: 42.2 % (ref 37–47)
HGB BLD-MCNC: 13.2 G/DL (ref 12–16)
IMM GRANULOCYTES # BLD AUTO: 0.02 K/UL (ref 0–0.11)
IMM GRANULOCYTES NFR BLD AUTO: 0.4 % (ref 0–0.9)
LYMPHOCYTES # BLD AUTO: 1.39 K/UL (ref 1–4.8)
LYMPHOCYTES NFR BLD: 29 % (ref 22–41)
MACROCYTES BLD QL SMEAR: ABNORMAL
MCH RBC QN AUTO: 27.7 PG (ref 27–33)
MCHC RBC AUTO-ENTMCNC: 31.3 G/DL (ref 33.6–35)
MCV RBC AUTO: 88.7 FL (ref 81.4–97.8)
MONOCYTES # BLD AUTO: 0.71 K/UL (ref 0–0.85)
MONOCYTES NFR BLD AUTO: 14.8 % (ref 0–13.4)
MORPHOLOGY BLD-IMP: NORMAL
NEUTROPHILS # BLD AUTO: 2.4 K/UL (ref 2–7.15)
NEUTROPHILS NFR BLD: 50 % (ref 44–72)
NRBC # BLD AUTO: 0 K/UL
NRBC BLD-RTO: 0 /100 WBC
PLATELET # BLD AUTO: 194 K/UL (ref 164–446)
PLATELET BLD QL SMEAR: NORMAL
PMV BLD AUTO: 10.6 FL (ref 9–12.9)
POTASSIUM SERPL-SCNC: 3.7 MMOL/L (ref 3.6–5.5)
PROT SERPL-MCNC: 6.7 G/DL (ref 6–8.2)
RBC # BLD AUTO: 4.76 M/UL (ref 4.2–5.4)
RBC BLD AUTO: PRESENT
SODIUM SERPL-SCNC: 140 MMOL/L (ref 135–145)
WBC # BLD AUTO: 4.8 K/UL (ref 4.8–10.8)

## 2020-08-25 PROCEDURE — 82378 CARCINOEMBRYONIC ANTIGEN: CPT

## 2020-08-25 PROCEDURE — G0498 CHEMO EXTEND IV INFUS W/PUMP: HCPCS

## 2020-08-25 PROCEDURE — A4212 NON CORING NEEDLE OR STYLET: HCPCS

## 2020-08-25 PROCEDURE — 85025 COMPLETE CBC W/AUTO DIFF WBC: CPT

## 2020-08-25 PROCEDURE — 700111 HCHG RX REV CODE 636 W/ 250 OVERRIDE (IP): Performed by: INTERNAL MEDICINE

## 2020-08-25 PROCEDURE — 80053 COMPREHEN METABOLIC PANEL: CPT

## 2020-08-25 PROCEDURE — 96374 THER/PROPH/DIAG INJ IV PUSH: CPT | Mod: XU

## 2020-08-25 PROCEDURE — 700111 HCHG RX REV CODE 636 W/ 250 OVERRIDE (IP)

## 2020-08-25 RX ORDER — DEXAMETHASONE SODIUM PHOSPHATE 4 MG/ML
8 INJECTION, SOLUTION INTRA-ARTICULAR; INTRALESIONAL; INTRAMUSCULAR; INTRAVENOUS; SOFT TISSUE ONCE
Status: COMPLETED | OUTPATIENT
Start: 2020-08-25 | End: 2020-08-25

## 2020-08-25 RX ORDER — HEPARIN SODIUM (PORCINE) LOCK FLUSH IV SOLN 100 UNIT/ML 100 UNIT/ML
SOLUTION INTRAVENOUS
Status: COMPLETED
Start: 2020-08-25 | End: 2020-08-25

## 2020-08-25 RX ADMIN — HEPARIN 5 ML: 100 SYRINGE at 07:20

## 2020-08-25 RX ADMIN — FLUOROURACIL 3515 MG: 50 INJECTION, SOLUTION INTRAVENOUS at 09:01

## 2020-08-25 RX ADMIN — DEXAMETHASONE SODIUM PHOSPHATE 8 MG: 4 INJECTION, SOLUTION INTRA-ARTICULAR; INTRALESIONAL; INTRAMUSCULAR; INTRAVENOUS; SOFT TISSUE at 08:27

## 2020-08-25 ASSESSMENT — FIBROSIS 4 INDEX
FIB4 SCORE: 2.67
FIB4 SCORE: 1.89

## 2020-08-25 NOTE — PROGRESS NOTES
Chemotherapy Verification - PRIMARY RN      Height = 1.635 m  Weight = 73.8 kg  BSA = 1.83 m^2       Medication: fluorouracil  Dose: 1920 mg/m^2  Calculated Dose: 3513.6 mg                             (In mg/m2, AUC, mg/kg)           I confirm this process was performed independently with the BSA and all final chemotherapy dosing calculations congruent.  Any discrepancies of 10% or greater have been addressed with the chemotherapy pharmacist. The resolution of the discrepancy has been documented in the EPIC progress notes.

## 2020-08-25 NOTE — PROGRESS NOTES
Chemotherapy Verification - SECONDARY RN   C103 D1      Height = 1.635 m  Weight = 73.8 kg  BSA = 1.83 m2       Medication: fluorouracil  Dose: 1920 mg/m2  Calculated Dose: 3513.6 mg                             (In mg/m2, AUC, mg/kg)       I confirm that this process was performed independently.

## 2020-08-25 NOTE — PROGRESS NOTES
Patient presents for lab draw for chemotherapy this morning. Port accessed using sterile technique, flushes well with brisk blood return. Port flushed per protocol and left in place. Patient over to [facility] for chemotherapy.

## 2020-08-25 NOTE — PROGRESS NOTES
"Pharmacy Chemotherapy Verification    Patient Name: Karlene Walters   Dx: Colon CA        Cycle 111 ([place] cycle 103 )    Previous treatment: 8/11/20    Protocol: 5-FU     IV over 2 hours on Day 1, followed by    IVP on Day 1, followed by                               -- 10/31/17: delete Leucovorin and 5-FU push d/t neutropenia per Dr. Hardy   Fluorouracil  continuous infusion over 46-48 hours                              -- 20% reduction (1920 mg/m²) to be continued starting C28 per C Alsop APRN    for 12 cycles (adjuvant) or until disease progression or unacceptable toxicity - q28 days per MD orders for tolerance  NCCN Guidelines for Colon Cancer. V.2.2015.  Jay T, et al. Eur J Cancer.1999;35(9):1343-7.      Allergies: Codeine; Oxaliplatin; Pcn; Sulfa drugs; and Tape     /78 Comment: transfered over from Friday this morning.  Pulse 72 Comment: transfered over from Friday this morning.  Temp 36.3 °C (97.3 °F) (Temporal) Comment: transfered over from Friday this morning. Comment (Src): transfered over from Friday this morning.  Resp 18 Comment: transfered over from Friday this morning.  Ht 1.635 m (5' 4.37\") Comment: transfered over from Friday this morning.  Wt 73.8 kg (162 lb 11.2 oz) Comment: transfered over from Friday this morning.  LMP  (LMP Unknown)   SpO2 97% Comment: transfered over from Friday this morning.  BMI 27.61 kg/m²  Body surface area is 1.83 meters squared.     Labs 8/25/20:  ANC~ 2400 Plt = 194k   Hgb = 13.2     SCr = 1.02 mg/dL CrCl ~ 51 mL/min   AST/ALT/ALK  = 33/26/117 TBili = 0.6     Fluorouracil (5-FU) 1920 mg/m² x 1.82 m² = 3513.6 mg              <10% difference, okay to treat with final dose = 3515 mg CIVI   Via CADD pump for home infusion over 46 hours @ 1.5 mL/hr    Asha Krishnan, PharmD                            "

## 2020-08-25 NOTE — PROGRESS NOTES
[facility]ene [facility] for Day 1/ Cycle 103 of 5FU pump. Pt denied having any new or acute complaints today, reports tolerating past treatments well. Port accessed prior, had + blood return, flushed briskly. Pt given dexamethasone and 5FU pump as prescribed, tolerated well, denied having any complaints during or after infusion. Karlene discharged home with 5FU pump infusing in personal konrad pack per pt preference. Next appointment Thursday at 1145, patient vocalized date and time.

## 2020-08-25 NOTE — PROGRESS NOTES
"Pharmacy Chemotherapy Verification Note:    Patient Name: Karlene Walters      Dx: Colon Ca      Protocol: 5-FU +        *Dosing Reference*                                    -- 10/31/17: delete Leucovorin and 5-FU push d/t neutropenia per Dr. Hardy   Fluorouracil 2400 mg/m² continuous infusion over 46-48 hours                              -- 20% reduction (1920 mg/m²) to be continued starting C28 per C Alsop APRN   14 day cycles for 12 cycles (adjuvant) or until disease progression or unacceptable toxicity    NCCN Guidelines for Colon Cancer. V.2.2015.  Jay GROVER, et al. Eur J Cancer.1999;35(9):1343-7.     Allergies:  Oxaliplatin; Codeine; Pcn [penicillins]; Sulfa drugs; and Tape     [facility].  Pulse 72 Comment: transfered over from [facility] this morning.  Temp 36.3 °C (97.3 °F) (Temporal) Comment: transfered over from [facility] this morning. Comment (Src): transfered over from [facility] this morning.  Resp 18 Comment[facility].  Ht 1.635 m (5' 4.37\") Comment: transfered over from [facility] this morning.  Wt 73.8 kg (162 lb 11.2 oz) Comment: transfered over from [facility] this morning.  LMP  (LMP Unknown)   SpO2 97% Comment: transfered over from [facility] this morning.  BMI 27.61 kg/m²  Body surface area is 1.83 meters squared.    Labs 8/25/20  ANC~ 2400 Plt = 194k   Hgb = 13.2     SCr = 1.02 mg/dL CrCl ~ 50.9 mL/min   LFT's = 33/26/117 TBili = 0.6      Drug Order   (Drug name, dose, route, IV Fluid & volume, frequency, number of doses) Cycle: 111 ([place] cycle 103)     Previous treatment: 8/11/20     Medication = Fluorouracil (5-FU)  Base Dose = 1920 mg/m²  Calc Dose: Base Dose x 1.83 m² =  3513.6 mg  Final Dose = 3515 mg  Route = CIVI via CADD pump  Fluid & Volume = 70.3 mL (+ 3 mL overfill = 73.3 mL)  Admin Duration = Over 46 hours (to run at 1.5 mL/hour)   via CADD pump       <10% difference, ok to treat with final written dose "     By my signature below, I confirm this process was performed independently with the BSA and all final chemotherapy dosing calculations congruent. I have reviewed the above chemotherapy order and that my calculation of the final dose and BSA (when applicable) corroborate those calculations of the  pharmacist.     Demetrius Maldonado, PharmD

## 2020-08-27 ENCOUNTER — OUTPATIENT INFUSION SERVICES (OUTPATIENT)
Dept: ONCOLOGY | Facility: MEDICAL CENTER | Age: 80
End: 2020-08-27
Attending: INTERNAL MEDICINE
Payer: MEDICARE

## 2020-08-27 VITALS
SYSTOLIC BLOOD PRESSURE: 147 MMHG | OXYGEN SATURATION: 98 % | DIASTOLIC BLOOD PRESSURE: 69 MMHG | HEART RATE: 89 BPM | TEMPERATURE: 97.5 F | RESPIRATION RATE: 17 BRPM | BODY MASS INDEX: 27.11 KG/M2 | HEIGHT: 65 IN | WEIGHT: 162.7 LBS

## 2020-08-27 DIAGNOSIS — C20 MALIGNANT NEOPLASM OF RECTUM (HCC): ICD-10-CM

## 2020-08-27 PROCEDURE — 96523 IRRIG DRUG DELIVERY DEVICE: CPT

## 2020-08-27 PROCEDURE — 700111 HCHG RX REV CODE 636 W/ 250 OVERRIDE (IP): Performed by: INTERNAL MEDICINE

## 2020-08-27 RX ORDER — HEPARIN SODIUM (PORCINE) LOCK FLUSH IV SOLN 100 UNIT/ML 100 UNIT/ML
500 SOLUTION INTRAVENOUS PRN
Status: DISCONTINUED | OUTPATIENT
Start: 2020-08-27 | End: 2020-08-27 | Stop reason: HOSPADM

## 2020-08-27 RX ADMIN — HEPARIN 500 UNITS: 100 SYRINGE at 11:45

## 2020-08-27 ASSESSMENT — FIBROSIS 4 INDEX: FIB4 SCORE: 2.67

## 2020-08-27 NOTE — PROGRESS NOTES
Here for 5 FU pump disconnect after 46 hrs of continuous infusion. See chemotherapy flow sheet for volume / dose administration. Port flushed per protocol, site d-accessed, needle intact compression dressing applied. Patient discharge home to self care in [facility]. Next appointment confirmed.

## 2020-09-03 RX ORDER — DEXAMETHASONE SODIUM PHOSPHATE 4 MG/ML
8 INJECTION, SOLUTION INTRA-ARTICULAR; INTRALESIONAL; INTRAMUSCULAR; INTRAVENOUS; SOFT TISSUE ONCE
Status: CANCELLED | OUTPATIENT
Start: 2020-09-08

## 2020-09-03 RX ORDER — HEPARIN SODIUM (PORCINE) LOCK FLUSH IV SOLN 100 UNIT/ML 100 UNIT/ML
500 SOLUTION INTRAVENOUS PRN
Status: CANCELLED | OUTPATIENT
Start: 2020-09-08

## 2020-09-03 RX ORDER — LIDOCAINE HYDROCHLORIDE 10 MG/ML
10 INJECTION, SOLUTION INFILTRATION; PERINEURAL ONCE
Status: CANCELLED | OUTPATIENT
Start: 2020-09-08

## 2020-09-03 RX ORDER — DEXTROSE MONOHYDRATE 50 MG/ML
INJECTION, SOLUTION INTRAVENOUS CONTINUOUS
Status: CANCELLED | OUTPATIENT
Start: 2020-09-08

## 2020-09-03 RX ORDER — ONDANSETRON 2 MG/ML
4 INJECTION INTRAMUSCULAR; INTRAVENOUS
Status: CANCELLED | OUTPATIENT
Start: 2020-09-08

## 2020-09-03 RX ORDER — PROCHLORPERAZINE MALEATE 10 MG
10 TABLET ORAL EVERY 6 HOURS PRN
Status: CANCELLED | OUTPATIENT
Start: 2020-09-08

## 2020-09-03 RX ORDER — ONDANSETRON 8 MG/1
8 TABLET, ORALLY DISINTEGRATING ORAL
Status: CANCELLED | OUTPATIENT
Start: 2020-09-08

## 2020-09-03 RX ORDER — HEPARIN SODIUM (PORCINE) LOCK FLUSH IV SOLN 100 UNIT/ML 100 UNIT/ML
500 SOLUTION INTRAVENOUS PRN
Status: CANCELLED | OUTPATIENT
Start: 2020-09-10

## 2020-09-03 ASSESSMENT — ENCOUNTER SYMPTOMS
DIZZINESS: 0
VOMITING: 0
INSOMNIA: 0
SHORTNESS OF BREATH: 0
FEVER: 0
PALPITATIONS: 0
CHILLS: 0
DIARRHEA: 0
WEIGHT LOSS: 0
MYALGIAS: 0
CONSTIPATION: 0
WHEEZING: 0
TINGLING: 0
HEADACHES: 0

## 2020-09-03 NOTE — PROGRESS NOTES
Subjective:      Karlene Walters is a 80 y.o. female who presents with Rectal Cancer ([place])        HPI  Ms. 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 She is unaccompanied for today's visit.     Patient was initially diagnosed 2012 secondary to bleeding.  She underwent laparoscopic resection with primary anastomosis per Dr. 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  Patient required diverting loop ostomy with subsequent associated surgeries.  Patient was noted liver mass in June 2015 for which he underwent ablative therapy and was initiated on XELOX.  She experienced allergic reaction to oxaliplatin and continued with Xeloda.  She experienced diarrhea and transitioned to single agent 5-FU in February 2016.  She continues with 20% dose reduction since cycle 21 (12/2016) and discontinuation of bolus 5-FU/LV at cycle 39, due to neutropenia and poor tolerance. PET scan completed 5/2017 showing small pulmonary nodules and isolated hepatic lesion. She completed Y90 radio embolization of liver lesion on 6/15/2017, without sequelae. S/p overlapping liver ablations x 3 per Dr. ROJAS on 11/15/18 for localized recurrence per PET completed 10/25/18 (following short break in treatment). CEA is improved and stable since ablation in November 2018. She continues with single agent 5FU for disease control and transitioned from q 14 days dosing to q 21 days in January 2020 to help with fatigue and tolerance and to every 28 days in April 2020. CT completed 5/7/2020 showed new lesion at right liver lobe. PET scan completed 5/14/20 showed increase uptake consisted with metastatic disease. She completed right liver ablation x 10 sites per Dr. ROJAS on 5/28/20. Repeat CT in 3 months (10/2/20) per Dr. ROJAS and continues with  "single agent 5FU.    Patient notes that she experiences a bit of anticipatory nausea prior to each cycle but otherwise does fairly well.  Her eyes are itchy from recent air quality due to smoke.  Ostomy continues with same output.        Allergies   Allergen Reactions   • Oxaliplatin Anaphylaxis   • Codeine      \"gets drunk\"   • Pcn [Penicillins] Itching     itching   • Sulfa Drugs Itching     itching   • Tape Rash     PAPER TAPE OK           Current Outpatient Medications on File Prior to Visit   Medication Sig Dispense Refill   • rivaroxaban (XARELTO) 10 MG Tab tablet Take 1 Tab by mouth every day. 30 Tab 5   • ondansetron (ZOFRAN) 4 MG Tab tablet Take 1 Tab by mouth every four hours as needed. 30 Tab 5   • lidocaine-prilocaine (EMLA) 2.5-2.5 % Cream Apply to port 1 hour prior to access of port and cover with plastic wrap. 30 g 3   • Tiotropium Bromide-Olodaterol (STIOLTO RESPIMAT) 2.5-2.5 MCG/ACT Aero Soln Take 2 Puffs by mouth every day. 1 Inhaler 11   • albuterol 108 (90 Base) MCG/ACT Aero Soln inhalation aerosol Inhale 2 Puffs by mouth every 6 hours as needed for Shortness of Breath. 8.5 g 11   • potassium chloride ER (KLOR-CON) 10 MEQ tablet Take 1 Tab by mouth every day. 100 Tab 3   • metronidazole (METROCREAM) 0.75 % cream Apply 1 Each to affected area(s) 2 times a day.     • cyanocobalamin (VITAMIN B-12) 500 MCG Tab Take 1,000 mcg by mouth every day.     • Multiple Vitamins-Minerals (CENTRUM SILVER PO) Take 1 Tab by mouth every day.     • Cholecalciferol (VITAMIN D3) 400 UNITS CAPS Take 1 Cap by mouth every day.     • loratadine (CLARITIN) 10 MG TABS Take 10 mg by mouth every day. Indications: Hayfever       No current facility-administered medications on file prior to visit.          Review of Systems   Constitutional: Negative for chills, fever, malaise/fatigue and weight loss.   Eyes:        Itchy from smoke   Respiratory: Positive for cough (stable). Negative for shortness of breath and wheezing.  " "  Cardiovascular: Negative for chest pain, palpitations and leg swelling.   Gastrointestinal: Positive for nausea (relieved with zofran as needed - a bit anticipatory nausea on the monday before treatment). Negative for constipation, diarrhea and vomiting.        Ostomy - no issues   Genitourinary: Negative for dysuria.        Perineal hernia is annoying   Musculoskeletal: Negative for myalgias.   Neurological: Negative for dizziness, tingling and headaches.        \"Quick turns\" can cause issues   Psychiatric/Behavioral: The patient does not have insomnia.           Objective:     /72   Pulse 95   Temp 37.1 °C (98.7 °F) (Temporal)   Resp 16   Ht 1.655 m (5' 5.16\")   Wt 74.6 kg (164 lb 9.2 oz)   LMP  (LMP Unknown)   SpO2 98%   BMI 27.25 kg/m²        Physical Exam  Vitals signs reviewed.   Constitutional:       General: She is not in acute distress.     Appearance: She is well-developed. She is not diaphoretic.   HENT:      Head: Normocephalic and atraumatic.      Mouth/Throat:      Pharynx: No oropharyngeal exudate.   Eyes:      General: No scleral icterus.        Right eye: No discharge.         Left eye: No discharge.      Conjunctiva/sclera: Conjunctivae normal.      Pupils: Pupils are equal, round, and reactive to light.   Neck:      Musculoskeletal: Normal range of motion and neck supple.   Cardiovascular:      Rate and Rhythm: Normal rate and regular rhythm.      Heart sounds: Normal heart sounds. No murmur. No friction rub. No gallop.    Pulmonary:      Effort: Pulmonary effort is normal. No respiratory distress.      Breath sounds: Normal breath sounds. No wheezing.   Abdominal:      General: Bowel sounds are normal. There is no distension.      Palpations: Abdomen is soft.      Tenderness: There is no abdominal tenderness.      Comments: ostomy   Musculoskeletal: Normal range of motion.   Skin:     General: Skin is warm and dry.      Coloration: Skin is not pale.      Findings: No erythema or " rash.   Neurological:      Mental Status: She is alert and oriented to person, place, and time.   Psychiatric:         Behavior: Behavior normal.         [facility] on 09/08/2020   Component Date Value Ref Range Status   • WBC 09/08/2020 5.4  4.8 - 10.8 K/uL Final   • RBC 09/08/2020 4.60  4.20 - 5.40 M/uL Final   • Hemoglobin 09/08/2020 12.9  12.0 - 16.0 g/dL Final   • Hematocrit 09/08/2020 40.6  37.0 - 47.0 % Final   • MCV 09/08/2020 88.3  81.4 - 97.8 fL Final   • MCH 09/08/2020 28.0  27.0 - 33.0 pg Final   • MCHC 09/08/2020 31.8* 33.6 - 35.0 g/dL Final   • RDW 09/08/2020 68.0* 35.9 - 50.0 fL Final   • Platelet Count 09/08/2020 203  164 - 446 K/uL Final   • MPV 09/08/2020 10.1  9.0 - 12.9 fL Final   • Neutrophils-Polys 09/08/2020 55.80  44.00 - 72.00 % Final   • Lymphocytes 09/08/2020 25.40  22.00 - 41.00 % Final   • Monocytes 09/08/2020 13.00  0.00 - 13.40 % Final   • Eosinophils 09/08/2020 4.50  0.00 - 6.90 % Final   • Basophils 09/08/2020 0.90  0.00 - 1.80 % Final   • Immature Granulocytes 09/08/2020 0.40  0.00 - 0.90 % Final   • Nucleated RBC 09/08/2020 0.00  /100 WBC Final   • Neutrophils (Absolute) 09/08/2020 3.01  2.00 - 7.15 K/uL Final    Includes immature neutrophils, if present.   • Lymphs (Absolute) 09/08/2020 1.37  1.00 - 4.80 K/uL Final   • Monos (Absolute) 09/08/2020 0.70  0.00 - 0.85 K/uL Final   • Eos (Absolute) 09/08/2020 0.24  0.00 - 0.51 K/uL Final   • Baso (Absolute) 09/08/2020 0.05  0.00 - 0.12 K/uL Final   • Immature Granulocytes (abs) 09/08/2020 0.02  0.00 - 0.11 K/uL Final   • NRBC (Absolute) 09/08/2020 0.00  K/uL Final   • Anisocytosis 09/08/2020 1+   Final   • Macrocytosis 09/08/2020 1+   Final   • Microcytosis 09/08/2020 1+   Final   • Sodium 09/08/2020 137  135 - 145 mmol/L Final   • Potassium 09/08/2020 3.8  3.6 - 5.5 mmol/L Final   • Chloride 09/08/2020 103  96 - 112 mmol/L Final   • Co2 09/08/2020 21  20 - 33 mmol/L Final   • Anion Gap 09/08/2020 13.0  7.0 - 16.0  Final   • Glucose 09/08/2020 109* 65 - 99 mg/dL Final   • Bun 09/08/2020 15  8 - 22 mg/dL Final   • Creatinine 09/08/2020 0.91  0.50 - 1.40 mg/dL Final   • Calcium 09/08/2020 9.3  8.5 - 10.5 mg/dL Final   • AST(SGOT) 09/08/2020 29  12 - 45 U/L Final   • ALT(SGPT) 09/08/2020 26  2 - 50 U/L Final   • Alkaline Phosphatase 09/08/2020 162* 30 - 99 U/L Final   • Total Bilirubin 09/08/2020 0.7  0.1 - 1.5 mg/dL Final   • Albumin 09/08/2020 3.8  3.2 - 4.9 g/dL Final   • Total Protein 09/08/2020 6.6  6.0 - 8.2 g/dL Final   • Globulin 09/08/2020 2.8  1.9 - 3.5 g/dL Final   • A-G Ratio 09/08/2020 1.4  g/dL Final   • Carcinoembryonic Antigen 09/08/2020 1.3  0.0 - 3.0 ng/mL Final    Comment: Effective 3/18/2020, this assay is performed using Roche ashleigh e immunoassay  analyzer. CEA values determined on patient samples by different testing  procedures cannot be directly compared with one another and could be the  cause of erroneous medical interpretations. If there is a change in the CEA  assay procedure used while monitoring therapy, then new baselines may need  to be established when comparing previous results with results received  after 3/17/2020.     • GFR If  09/08/2020 >60  >60 mL/min/1.73 m 2 Final   • GFR If Non  09/08/2020 59* >60 mL/min/1.73 m 2 Final   • Peripheral Smear Review 09/08/2020 see below   Final    Comment: Due to instrument suspect flags, further review of peripheral smear is  indicated on this patient sample. This review may or may not result in  abnormal findings.     • Plt Estimation 09/08/2020 Normal   Final   • RBC Morphology 09/08/2020 Present   Final   • Polychromia 09/08/2020 1+   Final   • Poikilocytosis 09/08/2020 1+   Final   • Ovalocytes 09/08/2020 1+   Final   • Comments-Diff 09/08/2020 see below   Final    Results have been verified by peripheral blood smear review.            Assessment/Plan:        1. Malignant neoplasm of rectum (HCC)     2. Secondary  malignant neoplasm of liver (HCC)     3. Encounter for long-term current use of high risk medication     4. Renal insufficiency             1.  Liver Mets: She is s/p Y90 radio embolization of liver lesion on 6/15/2017, without sequelae. S/p overlapping liver ablations x 3 per Dr. ROJAS on 11/15/18 for localized recurrence per PET completed 10/25/18 (following short break in treatment). PET completed 5/14/20 showed increased uptake in R liver lobe. She completed right liver ablation x 10 sites per Dr. ROJAS on 5/28/20. Repeat CT in 3 months (10/2/20) per Dr. ROJAS.     2.  Renal insufficiency: Decreased GFR stable for patient given loop ileostomy, continue to monitor.     3.  Rectal cancer: Patient initially diagnosed in 2012. She has been undergoing treatment with single agent 5-FU since February 2016.  She has intermittently undergone ablative procedures as well as Y90 for isolated hepatic lesions, most recently in 5/2020. CEA remains stable since 10/2018. Dosing was re-adjusted from q. 21 days back to q. 14 days with cycle 98. CBC, CMP, and CEA have been evaluated and found to be within acceptable limits, except where addressed above. Patient will proceed with cycle 104 5-FU and return in 2 weeks for evaluation prior to cycle 105, sooner as needed.        The patient verbalized agreement and understanding of current plan. All questions and concerns were addressed at time of visit.    Please note that this dictation was created using voice recognition software. I have made every reasonable attempt to correct obvious errors, but I expect that there are errors of grammar and possibly content that I did not discover before finalizing the note.

## 2020-09-07 NOTE — PROGRESS NOTES
"Pharmacy Chemotherapy Verification    Patient Name: Karlene Walters   Dx: Colon CA        Cycle 112 ([place] cycle 104 )    Previous treatment: 8/25/20    Protocol: 5-FU     IV over 2 hours on Day 1, followed by    IVP on Day 1, followed by                               -- 10/31/17: delete Leucovorin and 5-FU push d/t neutropenia per Dr. Hardy   Fluorouracil  continuous infusion over 46-48 hours                              -- 20% reduction (1920 mg/m²) to be continued starting C28 per C Alsop APRN    for 12 cycles (adjuvant) or until disease progression or unacceptable toxicity - q28 days per MD orders for tolerance  NCCN Guidelines for Colon Cancer. V.2.2015.  Jay T, et al. Eur J Cancer.1999;35(9):1343-7.      Allergies: Codeine; Oxaliplatin; Pcn; Sulfa drugs; and Tape     /65 Comment: Transfered over from this morning.  Pulse 83 Comment: Transfered over from this morning.  Temp 36.2 °C (97.2 °F) (Temporal) Comment: Transfered over from this morning. Comment (Src): Transfered over from this morning.  Resp 18 Comment: Transfered over from this morning.  Ht 1.64 m (5' 4.57\") Comment: Transfered over from this morning.  Wt 74.7 kg (164 lb 10.9 oz) Comment: Transfered over from this morning.  LMP  (LMP Unknown)   SpO2 98% Comment: Transfered over from this morning.  BMI 27.77 kg/m²  Body surface area is 1.84 meters squared.     Labs 9/8/20:  ANC~ 3010 Plt = 203k   Hgb = 12.9  SCr = 0.91 mg/dL CrCl ~ 58 mL/min   AST/ALT/ALK  = 29/26/162 TBili = 0.7    Fluorouracil (5-FU) 1920 mg/m² x 1.84 m² = 3533 mg              <10% difference, okay to treat with final dose = 3535 mg CIVI   Via CADD pump for home infusion over 46 hours @ 1.5 mL/hr    Norris Mitchell, PharmD                            "

## 2020-09-08 ENCOUNTER — OUTPATIENT INFUSION SERVICES (OUTPATIENT)
Dept: ONCOLOGY | Facility: MEDICAL CENTER | Age: 80
End: 2020-09-08
Attending: INTERNAL MEDICINE
Payer: MEDICARE

## 2020-09-08 ENCOUNTER — OFFICE VISIT (OUTPATIENT)
Dept: HEMATOLOGY ONCOLOGY | Facility: MEDICAL CENTER | Age: 80
End: 2020-09-08
Payer: MEDICARE

## 2020-09-08 VITALS
WEIGHT: 164.68 LBS | HEART RATE: 83 BPM | DIASTOLIC BLOOD PRESSURE: 65 MMHG | SYSTOLIC BLOOD PRESSURE: 124 MMHG | OXYGEN SATURATION: 98 % | RESPIRATION RATE: 18 BRPM | BODY MASS INDEX: 27.44 KG/M2 | HEIGHT: 65 IN | TEMPERATURE: 97.2 F

## 2020-09-08 VITALS
WEIGHT: 164.57 LBS | TEMPERATURE: 98.7 F | SYSTOLIC BLOOD PRESSURE: 132 MMHG | HEIGHT: 65 IN | HEART RATE: 95 BPM | BODY MASS INDEX: 27.42 KG/M2 | RESPIRATION RATE: 16 BRPM | OXYGEN SATURATION: 98 % | DIASTOLIC BLOOD PRESSURE: 72 MMHG

## 2020-09-08 VITALS
SYSTOLIC BLOOD PRESSURE: 124 MMHG | TEMPERATURE: 97.2 F | WEIGHT: 164.68 LBS | DIASTOLIC BLOOD PRESSURE: 65 MMHG | HEART RATE: 83 BPM | RESPIRATION RATE: 18 BRPM | BODY MASS INDEX: 27.44 KG/M2 | OXYGEN SATURATION: 98 % | HEIGHT: 65 IN

## 2020-09-08 DIAGNOSIS — C20 MALIGNANT NEOPLASM OF RECTUM (HCC): Chronic | ICD-10-CM

## 2020-09-08 DIAGNOSIS — Z79.899 ENCOUNTER FOR LONG-TERM CURRENT USE OF HIGH RISK MEDICATION: ICD-10-CM

## 2020-09-08 DIAGNOSIS — C78.7 SECONDARY MALIGNANT NEOPLASM OF LIVER (HCC): Chronic | ICD-10-CM

## 2020-09-08 DIAGNOSIS — N28.9 RENAL INSUFFICIENCY: ICD-10-CM

## 2020-09-08 DIAGNOSIS — C20 MALIGNANT NEOPLASM OF RECTUM (HCC): ICD-10-CM

## 2020-09-08 LAB
ALBUMIN SERPL BCP-MCNC: 3.8 G/DL (ref 3.2–4.9)
ALBUMIN/GLOB SERPL: 1.4 G/DL
ALP SERPL-CCNC: 162 U/L (ref 30–99)
ALT SERPL-CCNC: 26 U/L (ref 2–50)
ANION GAP SERPL CALC-SCNC: 13 MMOL/L (ref 7–16)
ANISOCYTOSIS BLD QL SMEAR: ABNORMAL
AST SERPL-CCNC: 29 U/L (ref 12–45)
BASOPHILS # BLD AUTO: 0.9 % (ref 0–1.8)
BASOPHILS # BLD: 0.05 K/UL (ref 0–0.12)
BILIRUB SERPL-MCNC: 0.7 MG/DL (ref 0.1–1.5)
BUN SERPL-MCNC: 15 MG/DL (ref 8–22)
CALCIUM SERPL-MCNC: 9.3 MG/DL (ref 8.5–10.5)
CEA SERPL-MCNC: 1.3 NG/ML (ref 0–3)
CHLORIDE SERPL-SCNC: 103 MMOL/L (ref 96–112)
CO2 SERPL-SCNC: 21 MMOL/L (ref 20–33)
COMMENT 1642: NORMAL
CREAT SERPL-MCNC: 0.91 MG/DL (ref 0.5–1.4)
EOSINOPHIL # BLD AUTO: 0.24 K/UL (ref 0–0.51)
EOSINOPHIL NFR BLD: 4.5 % (ref 0–6.9)
ERYTHROCYTE [DISTWIDTH] IN BLOOD BY AUTOMATED COUNT: 68 FL (ref 35.9–50)
GLOBULIN SER CALC-MCNC: 2.8 G/DL (ref 1.9–3.5)
GLUCOSE SERPL-MCNC: 109 MG/DL (ref 65–99)
HCT VFR BLD AUTO: 40.6 % (ref 37–47)
HGB BLD-MCNC: 12.9 G/DL (ref 12–16)
IMM GRANULOCYTES # BLD AUTO: 0.02 K/UL (ref 0–0.11)
IMM GRANULOCYTES NFR BLD AUTO: 0.4 % (ref 0–0.9)
LYMPHOCYTES # BLD AUTO: 1.37 K/UL (ref 1–4.8)
LYMPHOCYTES NFR BLD: 25.4 % (ref 22–41)
MACROCYTES BLD QL SMEAR: ABNORMAL
MCH RBC QN AUTO: 28 PG (ref 27–33)
MCHC RBC AUTO-ENTMCNC: 31.8 G/DL (ref 33.6–35)
MCV RBC AUTO: 88.3 FL (ref 81.4–97.8)
MICROCYTES BLD QL SMEAR: ABNORMAL
MONOCYTES # BLD AUTO: 0.7 K/UL (ref 0–0.85)
MONOCYTES NFR BLD AUTO: 13 % (ref 0–13.4)
MORPHOLOGY BLD-IMP: NORMAL
NEUTROPHILS # BLD AUTO: 3.01 K/UL (ref 2–7.15)
NEUTROPHILS NFR BLD: 55.8 % (ref 44–72)
NRBC # BLD AUTO: 0 K/UL
NRBC BLD-RTO: 0 /100 WBC
OVALOCYTES BLD QL SMEAR: NORMAL
PLATELET # BLD AUTO: 203 K/UL (ref 164–446)
PLATELET BLD QL SMEAR: NORMAL
PMV BLD AUTO: 10.1 FL (ref 9–12.9)
POIKILOCYTOSIS BLD QL SMEAR: NORMAL
POLYCHROMASIA BLD QL SMEAR: NORMAL
POTASSIUM SERPL-SCNC: 3.8 MMOL/L (ref 3.6–5.5)
PROT SERPL-MCNC: 6.6 G/DL (ref 6–8.2)
RBC # BLD AUTO: 4.6 M/UL (ref 4.2–5.4)
RBC BLD AUTO: PRESENT
SODIUM SERPL-SCNC: 137 MMOL/L (ref 135–145)
WBC # BLD AUTO: 5.4 K/UL (ref 4.8–10.8)

## 2020-09-08 PROCEDURE — 82378 CARCINOEMBRYONIC ANTIGEN: CPT

## 2020-09-08 PROCEDURE — 700111 HCHG RX REV CODE 636 W/ 250 OVERRIDE (IP)

## 2020-09-08 PROCEDURE — 96374 THER/PROPH/DIAG INJ IV PUSH: CPT | Mod: XU

## 2020-09-08 PROCEDURE — 80053 COMPREHEN METABOLIC PANEL: CPT

## 2020-09-08 PROCEDURE — A4212 NON CORING NEEDLE OR STYLET: HCPCS

## 2020-09-08 PROCEDURE — 700111 HCHG RX REV CODE 636 W/ 250 OVERRIDE (IP): Performed by: NURSE PRACTITIONER

## 2020-09-08 PROCEDURE — G0498 CHEMO EXTEND IV INFUS W/PUMP: HCPCS

## 2020-09-08 PROCEDURE — 85025 COMPLETE CBC W/AUTO DIFF WBC: CPT

## 2020-09-08 PROCEDURE — 99214 OFFICE O/P EST MOD 30 MIN: CPT | Performed by: NURSE PRACTITIONER

## 2020-09-08 RX ORDER — HEPARIN SODIUM (PORCINE) LOCK FLUSH IV SOLN 100 UNIT/ML 100 UNIT/ML
SOLUTION INTRAVENOUS
Status: COMPLETED
Start: 2020-09-08 | End: 2020-09-08

## 2020-09-08 RX ORDER — DEXAMETHASONE SODIUM PHOSPHATE 4 MG/ML
8 INJECTION, SOLUTION INTRA-ARTICULAR; INTRALESIONAL; INTRAMUSCULAR; INTRAVENOUS; SOFT TISSUE ONCE
Status: COMPLETED | OUTPATIENT
Start: 2020-09-08 | End: 2020-09-08

## 2020-09-08 RX ADMIN — DEXAMETHASONE SODIUM PHOSPHATE 8 MG: 4 INJECTION, SOLUTION INTRA-ARTICULAR; INTRALESIONAL; INTRAMUSCULAR; INTRAVENOUS; SOFT TISSUE at 08:37

## 2020-09-08 RX ADMIN — FLUOROURACIL 3535 MG: 50 INJECTION, SOLUTION INTRAVENOUS at 08:54

## 2020-09-08 RX ADMIN — HEPARIN 500 UNITS: 100 SYRINGE at 07:10

## 2020-09-08 ASSESSMENT — ENCOUNTER SYMPTOMS
COUGH: 1
NAUSEA: 1

## 2020-09-08 ASSESSMENT — FIBROSIS 4 INDEX
FIB4 SCORE: 2.24
FIB4 SCORE: 2.67
FIB4 SCORE: 2.67

## 2020-09-08 NOTE — PROGRESS NOTES
"Pharmacy Chemotherapy Verification Note:    Patient Name: Karlene Walters      Dx: Colon Ca      Protocol: 5-FU +        *Dosing Reference*                                    -- 10/31/17: delete Leucovorin and 5-FU push d/t neutropenia per Dr. Hardy   Fluorouracil 2400 mg/m² continuous infusion over 46-48 hours                              -- 20% reduction (1920 mg/m²) to be continued starting C28 per C Alsop APRN   14 day cycles for 12 cycles (adjuvant) or until disease progression or unacceptable toxicity    NCCN Guidelines for Colon Cancer. V.2.2015.  Jay T, et al. Eur J Cancer.1999;35(9):1343-7.     Allergies:  Oxaliplatin; Codeine; Pcn [penicillins]; Sulfa drugs; and Tape     /65 Comment: Transfered over from this morning.  Pulse 83 Comment: Transfered over from this morning.  Temp 36.2 °C (97.2 °F) (Temporal) Comment: Transfered over from this morning. Comment (Src): Transfered over from this morning.  Resp 18 Comment: Transfered over from this morning.  Ht 1.64 m (5' 4.57\") Comment: Transfered over from this morning.  Wt 74.7 kg (164 lb 10.9 oz) Comment: Transfered over from this morning.  LMP  (LMP Unknown)   SpO2 98% Comment: Transfered over from this morning.  BMI 27.77 kg/m²  Body surface area is 1.84 meters squared.    Labs 9/8/20:  ANC~ 3010 Plt = 203k   Hgb = 12.9     SCr = 0.91 mg/dL CrCl ~ 58 mL/min   LFT's = 29/26/162 TBili = 0.7      Drug Order   (Drug name, dose, route, IV Fluid & volume, frequency, number of doses) Cycle 112 ([place] cycle 104)     Previous treatment: 8/25/20     Medication = Fluorouracil (5-FU)  Base Dose = 1920 mg/m²  Calc Dose: Base Dose x 1.84 m² =  3532.8 mg  Final Dose = 3535 mg  Route = CIVI   Fluid & Volume = 70.7 mL (+ 3 mL overfill =   73.7 mL)  Admin Duration = Over 46 hours (to run at 1.5 mL/hour)   Via CADD pump for home infusion       <10% difference, okay to treat with final written dose     By my signature below, I confirm this process was " performed independently with the BSA and all final chemotherapy dosing calculations congruent. I have reviewed the above chemotherapy order and that my calculation of the final dose and BSA (when applicable) corroborate those calculations of the  pharmacist.     Asha Krishnan, PharmD

## 2020-09-08 NOTE — PROGRESS NOTES
Pt arrived to [facility], ambulatory, for 5FU. Pt voices no complaints. Port (accessed earlier this morning) flushed per policy, positive blood return noted. Labs reviewed, pt wihtin parameters to treat today. Dexamethasone administered with no s/sx of adverse reaction. 5FU CADD pump connected with no complications. Pt left [facility] with no s/sx of distress. Follow up appointment confirmed.

## 2020-09-08 NOTE — PROGRESS NOTES
Chemotherapy Verification - SECONDARY RN       Height = 164 cm  Weight = 74.7 kg  BSA = 1.84 m2       Medication: Adrucil  Dose: 1,920 mg/m2  Calculated Dose: 3,532.8 mg                             (In mg/m2, AUC, mg/kg)         I confirm that this process was performed independently.

## 2020-09-08 NOTE — PROGRESS NOTES
Pt arrived to [facility], ambulatory, for pre-chemo labs. Pt voices no complaints. Port accessed in sterile manner, positive blood return noted. Labs drawn per order. Port flushed and heparin locked per policy, port left accessed. Pt left IS with no s/sx of distress. Follow up appointment confirmed.

## 2020-09-08 NOTE — PROGRESS NOTES
Chemotherapy Verification - PRIMARY RN      Height = 164 cm  Weight = 74.7 kg  BSA = 1.84 m2       Medication: Fluorouracil  Dose: 1,920 mg/m2  Calculated Dose: 3,532.8 mg                              (In mg/m2, AUC, mg/kg)       I confirm this process was performed independently with the BSA and all final chemotherapy dosing calculations congruent.  Any discrepancies of 10% or greater have been addressed with the chemotherapy pharmacist. The resolution of the discrepancy has been documented in the EPIC progress notes.

## 2020-09-10 ENCOUNTER — OUTPATIENT INFUSION SERVICES (OUTPATIENT)
Dept: ONCOLOGY | Facility: MEDICAL CENTER | Age: 80
End: 2020-09-10
Attending: INTERNAL MEDICINE
Payer: MEDICARE

## 2020-09-10 VITALS
TEMPERATURE: 97 F | SYSTOLIC BLOOD PRESSURE: 116 MMHG | HEIGHT: 65 IN | WEIGHT: 163.14 LBS | RESPIRATION RATE: 18 BRPM | BODY MASS INDEX: 27.18 KG/M2 | OXYGEN SATURATION: 98 % | DIASTOLIC BLOOD PRESSURE: 71 MMHG | HEART RATE: 84 BPM

## 2020-09-10 DIAGNOSIS — C20 MALIGNANT NEOPLASM OF RECTUM (HCC): ICD-10-CM

## 2020-09-10 PROCEDURE — 96523 IRRIG DRUG DELIVERY DEVICE: CPT

## 2020-09-10 PROCEDURE — 700111 HCHG RX REV CODE 636 W/ 250 OVERRIDE (IP): Performed by: NURSE PRACTITIONER

## 2020-09-10 RX ORDER — HEPARIN SODIUM (PORCINE) LOCK FLUSH IV SOLN 100 UNIT/ML 100 UNIT/ML
500 SOLUTION INTRAVENOUS PRN
Status: DISCONTINUED | OUTPATIENT
Start: 2020-09-10 | End: 2020-09-10 | Stop reason: HOSPADM

## 2020-09-10 RX ADMIN — HEPARIN 500 UNITS: 100 SYRINGE at 11:35

## 2020-09-10 ASSESSMENT — FIBROSIS 4 INDEX: FIB4 SCORE: 2.24

## 2020-09-10 NOTE — PROGRESS NOTES
Pt arrived ambulatory to infusion. She is feeling well. CADD pump empty and turned off. Disconnected, port flushed and blood return noted. Flushed with NS and heparin locked. Covered with gauze and tape. Pt left infusion center in stable condition.

## 2020-09-21 RX ORDER — DEXTROSE MONOHYDRATE 50 MG/ML
INJECTION, SOLUTION INTRAVENOUS CONTINUOUS
Status: CANCELLED | OUTPATIENT
Start: 2020-09-22

## 2020-09-21 RX ORDER — HEPARIN SODIUM (PORCINE) LOCK FLUSH IV SOLN 100 UNIT/ML 100 UNIT/ML
500 SOLUTION INTRAVENOUS PRN
Status: CANCELLED | OUTPATIENT
Start: 2020-09-24

## 2020-09-21 RX ORDER — DEXAMETHASONE SODIUM PHOSPHATE 4 MG/ML
8 INJECTION, SOLUTION INTRA-ARTICULAR; INTRALESIONAL; INTRAMUSCULAR; INTRAVENOUS; SOFT TISSUE ONCE
Status: CANCELLED | OUTPATIENT
Start: 2020-09-22

## 2020-09-21 RX ORDER — HEPARIN SODIUM (PORCINE) LOCK FLUSH IV SOLN 100 UNIT/ML 100 UNIT/ML
500 SOLUTION INTRAVENOUS PRN
Status: CANCELLED | OUTPATIENT
Start: 2020-09-22

## 2020-09-21 RX ORDER — ONDANSETRON 8 MG/1
8 TABLET, ORALLY DISINTEGRATING ORAL
Status: CANCELLED | OUTPATIENT
Start: 2020-09-22

## 2020-09-21 RX ORDER — ONDANSETRON 2 MG/ML
4 INJECTION INTRAMUSCULAR; INTRAVENOUS
Status: CANCELLED | OUTPATIENT
Start: 2020-09-22

## 2020-09-21 RX ORDER — LIDOCAINE HYDROCHLORIDE 10 MG/ML
10 INJECTION, SOLUTION INFILTRATION; PERINEURAL ONCE
Status: CANCELLED | OUTPATIENT
Start: 2020-09-22

## 2020-09-21 RX ORDER — PROCHLORPERAZINE MALEATE 10 MG
10 TABLET ORAL EVERY 6 HOURS PRN
Status: CANCELLED | OUTPATIENT
Start: 2020-09-22

## 2020-09-22 ENCOUNTER — OUTPATIENT INFUSION SERVICES (OUTPATIENT)
Dept: ONCOLOGY | Facility: MEDICAL CENTER | Age: 80
End: 2020-09-22
Attending: INTERNAL MEDICINE
Payer: MEDICARE

## 2020-09-22 ENCOUNTER — OFFICE VISIT (OUTPATIENT)
Dept: HEMATOLOGY ONCOLOGY | Facility: MEDICAL CENTER | Age: 80
End: 2020-09-22
Payer: MEDICARE

## 2020-09-22 VITALS
OXYGEN SATURATION: 98 % | TEMPERATURE: 97.4 F | HEART RATE: 97 BPM | BODY MASS INDEX: 27.14 KG/M2 | HEIGHT: 65 IN | WEIGHT: 162.92 LBS | DIASTOLIC BLOOD PRESSURE: 75 MMHG | SYSTOLIC BLOOD PRESSURE: 137 MMHG | RESPIRATION RATE: 18 BRPM

## 2020-09-22 VITALS
HEIGHT: 66 IN | BODY MASS INDEX: 26.09 KG/M2 | HEART RATE: 92 BPM | DIASTOLIC BLOOD PRESSURE: 72 MMHG | RESPIRATION RATE: 16 BRPM | SYSTOLIC BLOOD PRESSURE: 128 MMHG | WEIGHT: 162.37 LBS | TEMPERATURE: 97.8 F | OXYGEN SATURATION: 99 %

## 2020-09-22 VITALS
HEART RATE: 97 BPM | BODY MASS INDEX: 27.14 KG/M2 | TEMPERATURE: 97.4 F | WEIGHT: 162.92 LBS | SYSTOLIC BLOOD PRESSURE: 137 MMHG | HEIGHT: 65 IN | OXYGEN SATURATION: 98 % | DIASTOLIC BLOOD PRESSURE: 75 MMHG | RESPIRATION RATE: 18 BRPM

## 2020-09-22 DIAGNOSIS — C78.7 SECONDARY MALIGNANT NEOPLASM OF LIVER (HCC): ICD-10-CM

## 2020-09-22 DIAGNOSIS — Z79.01 CURRENT USE OF LONG TERM ANTICOAGULATION: ICD-10-CM

## 2020-09-22 DIAGNOSIS — Z86.711 HISTORY OF PULMONARY EMBOLUS (PE): ICD-10-CM

## 2020-09-22 DIAGNOSIS — C20 MALIGNANT NEOPLASM OF RECTUM (HCC): ICD-10-CM

## 2020-09-22 DIAGNOSIS — Z79.899 ENCOUNTER FOR LONG-TERM CURRENT USE OF HIGH RISK MEDICATION: ICD-10-CM

## 2020-09-22 LAB
ALBUMIN SERPL BCP-MCNC: 3.6 G/DL (ref 3.2–4.9)
ALBUMIN/GLOB SERPL: 1.2 G/DL
ALP SERPL-CCNC: 164 U/L (ref 30–99)
ALT SERPL-CCNC: 19 U/L (ref 2–50)
ANION GAP SERPL CALC-SCNC: 12 MMOL/L (ref 7–16)
ANISOCYTOSIS BLD QL SMEAR: ABNORMAL
AST SERPL-CCNC: 22 U/L (ref 12–45)
BASOPHILS # BLD AUTO: 0.7 % (ref 0–1.8)
BASOPHILS # BLD: 0.05 K/UL (ref 0–0.12)
BILIRUB SERPL-MCNC: 1 MG/DL (ref 0.1–1.5)
BUN SERPL-MCNC: 15 MG/DL (ref 8–22)
CALCIUM SERPL-MCNC: 9.2 MG/DL (ref 8.5–10.5)
CEA SERPL-MCNC: 1.2 NG/ML (ref 0–3)
CHLORIDE SERPL-SCNC: 101 MMOL/L (ref 96–112)
CO2 SERPL-SCNC: 23 MMOL/L (ref 20–33)
COMMENT 1642: NORMAL
CREAT SERPL-MCNC: 0.95 MG/DL (ref 0.5–1.4)
EOSINOPHIL # BLD AUTO: 0.24 K/UL (ref 0–0.51)
EOSINOPHIL NFR BLD: 3.3 % (ref 0–6.9)
ERYTHROCYTE [DISTWIDTH] IN BLOOD BY AUTOMATED COUNT: 70.4 FL (ref 35.9–50)
GLOBULIN SER CALC-MCNC: 3 G/DL (ref 1.9–3.5)
GLUCOSE SERPL-MCNC: 103 MG/DL (ref 65–99)
HCT VFR BLD AUTO: 40.6 % (ref 37–47)
HGB BLD-MCNC: 12.9 G/DL (ref 12–16)
IMM GRANULOCYTES # BLD AUTO: 0.03 K/UL (ref 0–0.11)
IMM GRANULOCYTES NFR BLD AUTO: 0.4 % (ref 0–0.9)
LYMPHOCYTES # BLD AUTO: 1.35 K/UL (ref 1–4.8)
LYMPHOCYTES NFR BLD: 18.8 % (ref 22–41)
MACROCYTES BLD QL SMEAR: ABNORMAL
MCH RBC QN AUTO: 28.7 PG (ref 27–33)
MCHC RBC AUTO-ENTMCNC: 31.8 G/DL (ref 33.6–35)
MCV RBC AUTO: 90.2 FL (ref 81.4–97.8)
MICROCYTES BLD QL SMEAR: ABNORMAL
MONOCYTES # BLD AUTO: 0.91 K/UL (ref 0–0.85)
MONOCYTES NFR BLD AUTO: 12.7 % (ref 0–13.4)
MORPHOLOGY BLD-IMP: NORMAL
NEUTROPHILS # BLD AUTO: 4.61 K/UL (ref 2–7.15)
NEUTROPHILS NFR BLD: 64.1 % (ref 44–72)
NRBC # BLD AUTO: 0 K/UL
NRBC BLD-RTO: 0 /100 WBC
PLATELET # BLD AUTO: 223 K/UL (ref 164–446)
PLATELET BLD QL SMEAR: NORMAL
PMV BLD AUTO: 10.2 FL (ref 9–12.9)
POTASSIUM SERPL-SCNC: 3.8 MMOL/L (ref 3.6–5.5)
PROT SERPL-MCNC: 6.6 G/DL (ref 6–8.2)
RBC # BLD AUTO: 4.5 M/UL (ref 4.2–5.4)
RBC BLD AUTO: PRESENT
SODIUM SERPL-SCNC: 136 MMOL/L (ref 135–145)
WBC # BLD AUTO: 7.2 K/UL (ref 4.8–10.8)

## 2020-09-22 PROCEDURE — 85025 COMPLETE CBC W/AUTO DIFF WBC: CPT

## 2020-09-22 PROCEDURE — A4212 NON CORING NEEDLE OR STYLET: HCPCS

## 2020-09-22 PROCEDURE — 99214 OFFICE O/P EST MOD 30 MIN: CPT | Performed by: NURSE PRACTITIONER

## 2020-09-22 PROCEDURE — 96375 TX/PRO/DX INJ NEW DRUG ADDON: CPT

## 2020-09-22 PROCEDURE — 80053 COMPREHEN METABOLIC PANEL: CPT

## 2020-09-22 PROCEDURE — 82378 CARCINOEMBRYONIC ANTIGEN: CPT

## 2020-09-22 PROCEDURE — 96374 THER/PROPH/DIAG INJ IV PUSH: CPT

## 2020-09-22 PROCEDURE — 700111 HCHG RX REV CODE 636 W/ 250 OVERRIDE (IP)

## 2020-09-22 PROCEDURE — G0498 CHEMO EXTEND IV INFUS W/PUMP: HCPCS

## 2020-09-22 PROCEDURE — 700111 HCHG RX REV CODE 636 W/ 250 OVERRIDE (IP): Performed by: INTERNAL MEDICINE

## 2020-09-22 RX ORDER — HEPARIN SODIUM (PORCINE) LOCK FLUSH IV SOLN 100 UNIT/ML 100 UNIT/ML
500 SOLUTION INTRAVENOUS PRN
Status: DISCONTINUED | OUTPATIENT
Start: 2020-09-22 | End: 2020-09-22 | Stop reason: HOSPADM

## 2020-09-22 RX ORDER — DEXAMETHASONE SODIUM PHOSPHATE 4 MG/ML
8 INJECTION, SOLUTION INTRA-ARTICULAR; INTRALESIONAL; INTRAMUSCULAR; INTRAVENOUS; SOFT TISSUE ONCE
Status: COMPLETED | OUTPATIENT
Start: 2020-09-22 | End: 2020-09-22

## 2020-09-22 RX ORDER — HEPARIN SODIUM (PORCINE) LOCK FLUSH IV SOLN 100 UNIT/ML 100 UNIT/ML
SOLUTION INTRAVENOUS
Status: COMPLETED
Start: 2020-09-22 | End: 2020-09-22

## 2020-09-22 RX ADMIN — DEXAMETHASONE SODIUM PHOSPHATE 8 MG: 4 INJECTION, SOLUTION INTRA-ARTICULAR; INTRALESIONAL; INTRAMUSCULAR; INTRAVENOUS; SOFT TISSUE at 09:28

## 2020-09-22 RX ADMIN — HEPARIN 500 UNITS: 100 SYRINGE at 07:25

## 2020-09-22 RX ADMIN — HEPARIN SODIUM (PORCINE) LOCK FLUSH IV SOLN 100 UNIT/ML 500 UNITS: 100 SOLUTION at 07:25

## 2020-09-22 RX ADMIN — FLUOROURACIL 3515 MG: 50 INJECTION, SOLUTION INTRAVENOUS at 10:07

## 2020-09-22 ASSESSMENT — FIBROSIS 4 INDEX
FIB4 SCORE: 2.24
FIB4 SCORE: 2.24
FIB4 SCORE: 1.99

## 2020-09-22 ASSESSMENT — ENCOUNTER SYMPTOMS
COUGH: 0
HEADACHES: 0
CHILLS: 0
CONSTIPATION: 0
INSOMNIA: 1
SHORTNESS OF BREATH: 1
VOMITING: 0
DIZZINESS: 1
NAUSEA: 1
PALPITATIONS: 0
DIARRHEA: 0
WEIGHT LOSS: 0
TINGLING: 0
FEVER: 0
MYALGIAS: 0

## 2020-09-22 NOTE — PROGRESS NOTES
Pt ambulatory to [facility] for Port access and pre-chemotherapy lab draw.  PORT accessed in sterile fashion x 2 attempts with low profile needle.  Brisk blood return observed, flushes easily.  Labs collected, port flushed with NS and heparin instilled.  Pt discharged to self care in [facility], returns at 0800 for chemotherapy treatment.

## 2020-09-22 NOTE — PROGRESS NOTES
"Pharmacy Chemotherapy Verification    Patient Name: Karlene Walters   Dx: Colon CA        Cycle 113 ([place] cycle 105 )    Previous treatment: 9/8/20    Protocol: 5-FU     IV over 2 hours on Day 1, followed by    IVP on Day 1, followed by                               -- 10/31/17: delete Leucovorin and 5-FU push d/t neutropenia per Dr. Hardy   Fluorouracil  continuous infusion over 46-48 hours                              -- 20% reduction (1920 mg/m²) to be continued starting C28 per C Alsop APRN    for 12 cycles (adjuvant) or until disease progression or unacceptable toxicity - q28 days per MD orders for tolerance  NCCN Guidelines for Colon Cancer. V.2.2015.  Jay T, et al. Eur J Cancer.1999;35(9):1343-7.      Allergies: Codeine; Oxaliplatin; Pcn; Sulfa drugs; and Tape     /75   Pulse 97   Temp 36.3 °C (97.4 °F) (Temporal)   Resp 18   Ht 1.64 m (5' 4.57\")   Wt 73.9 kg (162 lb 14.7 oz)   LMP  (LMP Unknown)   SpO2 98%   BMI 27.48 kg/m²  Body surface area is 1.83 meters squared.     Labs from 9/22/20 reviewed - all within treatment parameters.    Fluorouracil (5-FU) 1920 mg/m² x 1.83 m² = 3513.6 mg              <10% difference, okay to treat with final dose = 3515 mg CIVI via CADD pump over 46 hours    Tiffanie Olivier, PharmD, BCOP                            "

## 2020-09-22 NOTE — PROGRESS NOTES
"Pharmacy Chemotherapy Verification Note:    Patient Name: Karlene Walters      Dx: Colon Ca      Protocol: 5-FU +        *Dosing Reference*                                    -- 10/31/17: delete Leucovorin and 5-FU push d/t neutropenia per Dr. Hardy   Fluorouracil 2400 mg/m² continuous infusion over 46-48 hours                              -- 20% reduction (1920 mg/m²) to be continued starting C28 per C Alsop APRN   14 day cycles for 12 cycles (adjuvant) or until disease progression or unacceptable toxicity    NCCN Guidelines for Colon Cancer. V.2.2015.  Jay GROVER, et al. Eur J Cancer.1999;35(9):1343-7.     Allergies:  Oxaliplatin; Codeine; Pcn [penicillins]; Sulfa drugs; and Tape     /75   Pulse 97   Temp 36.3 °C (97.4 °F) (Temporal)   Resp 18   Ht 1.64 m (5' 4.57\")   Wt 73.9 kg (162 lb 14.7 oz)   LMP  (LMP Unknown)   SpO2 98%   BMI 27.48 kg/m²  Body surface area is 1.83 meters squared.    Labs 9/22/20:  ANC~ 4610 Plt = 223k   Hgb = 12.9     SCr = 0.95 mg/dL CrCl ~ 55 mL/min   AST/ALT/ALK= 22/19/164 TBili = 1       Drug Order   (Drug name, dose, route, IV Fluid & volume, frequency, number of doses) Cycle 113 ([place] cycle 105)     Previous treatment: 9/8/20     Medication = Fluorouracil (5-FU)  Base Dose = 1920 mg/m²  Calc Dose: Base Dose x 1.83 m² =  3513.6 mg  Final Dose = 3515 mg  Route = CIVI   Fluid & Volume = 70.3 mL (+ 3 mL overfill =   73.3 mL)  Admin Duration = Over 46 hours (to run at 1.5 mL/hour)   Via CADD pump for home infusion       <10% difference, okay to treat with final written dose     By my signature below, I confirm this process was performed independently with the BSA and all final chemotherapy dosing calculations congruent. I have reviewed the above chemotherapy order and that my calculation of the final dose and BSA (when applicable) corroborate those calculations of the  pharmacist.     Asha Krishnan, PharmD    "

## 2020-09-22 NOTE — PROGRESS NOTES
Subjective:      Karlene Walters is a 80 y.o. female who presents for metastatic rectal Cancer to liver (Prechemo)        HPI    Patient seen today in follow-up for metastatic rectal carcinoma to liver.  She presents unaccompanied for today's visit.  Patient is scheduled to receive single agent 5-FU which she continues every 2-week interval.    Patient with a long history of rectal carcinoma initially diagnosed in 2012.  She was found to have metastatic disease to liver and lung in 2015.  She is status post liver ablation as well as has received Y 90 in the past.  She was on XELOX initially but had allergic reaction to oxaliplatin and been switched to 5-FU.  She has been on single agent 5-FU for quite some time.  In January 2020 patient was changed to every 3-week cycle at cycle #91 and then changed to every 4-week interval in May 2020.  Was at that time there is noted to have growth of disease within the liver and was seen by Dr. West.  Patient did undergo surgery in hopes of resection of the tumor but it was felt to be too close to the hilum and therefore she did undergo ablation instead on 5/28/20.  She has been back on single agent 5-FU at every 2-week interval, restarted on 6/16/20. Last CT 6/29/20 showed no new liver lesions and no new metastasis within the abdomen.  Follow-up CT scan per Dr. West is scheduled for 10/5/20.     Patient stated she is feeling well.  She stated that she is tolerating treatment well with mild fatigue present but very tolerable.  She is very active.  She denies any fevers chills or weight loss.  Her appetite is been stable and she is attempting to eat well-balanced meals and cook more at home versus eating out.  She continues to have shortness of breath which has been worked up significantly in the past.  Shortness of breath is stable and per patient she stated may be slightly better.  She does follow-up with pulmonary in December and will undergo PFTs at that  "time.  She denies a cough or wheezing.  She does tend to clear her throat at times but states she has not needed her inhaler.  She denies any significant chest pain or heart palpitations.  She does still continue to have chest pain following removal of her Xeloda pump which is been chronic for many years.  She has anticipatory nausea at times, typically on Mondays in preparation for her chemo.  She states on Thursdays, with removal of her Xeloda pump she does not tend to feel well but not necessarily \"sick.\"  Output via her ostomy is stable.  She denies any significant abdominal pain, but states she does still have some tenderness to the incision site.  She is voiding without difficulty and denies any hematuria or dysuria.  She does continue to have dry skin to her hands and feet, but no cracking or peeling.  Denies mouth sores.  She does have some generalized dizziness at times but denies any falling.  She continues on Xarelto without any issues.    Patient does see cardiology tomorrow.    Allergies   Allergen Reactions   • Oxaliplatin Anaphylaxis   • Codeine      \"gets drunk\"   • Pcn [Penicillins] Itching     itching   • Sulfa Drugs Itching     itching   • Tape Rash     PAPER TAPE OK     Current Outpatient Medications on File Prior to Visit   Medication Sig Dispense Refill   • rivaroxaban (XARELTO) 10 MG Tab tablet Take 1 Tab by mouth every day. 30 Tab 5   • ondansetron (ZOFRAN) 4 MG Tab tablet Take 1 Tab by mouth every four hours as needed. 30 Tab 5   • lidocaine-prilocaine (EMLA) 2.5-2.5 % Cream Apply to port 1 hour prior to access of port and cover with plastic wrap. 30 g 3   • Tiotropium Bromide-Olodaterol (STIOLTO RESPIMAT) 2.5-2.5 MCG/ACT Aero Soln Take 2 Puffs by mouth every day. 1 Inhaler 11   • albuterol 108 (90 Base) MCG/ACT Aero Soln inhalation aerosol Inhale 2 Puffs by mouth every 6 hours as needed for Shortness of Breath. 8.5 g 11   • potassium chloride ER (KLOR-CON) 10 MEQ tablet Take 1 Tab by mouth " "every day. 100 Tab 3   • metronidazole (METROCREAM) 0.75 % cream Apply 1 Each to affected area(s) 2 times a day.     • cyanocobalamin (VITAMIN B-12) 500 MCG Tab Take 1,000 mcg by mouth every day.     • Multiple Vitamins-Minerals (CENTRUM SILVER PO) Take 1 Tab by mouth every day.     • Cholecalciferol (VITAMIN D3) 400 UNITS CAPS Take 1 Cap by mouth every day.     • loratadine (CLARITIN) 10 MG TABS Take 10 mg by mouth every day. Indications: Hayfever       No current facility-administered medications on file prior to visit.        Review of Systems   Constitutional: Positive for malaise/fatigue (mild present and tolerable). Negative for chills, fever and weight loss.   Respiratory: Positive for shortness of breath (stable and maybe slightly better per patient). Negative for cough.         Clears throat - no need for inhaler   Cardiovascular: Negative for chest pain and palpitations.        Still with chest pain following removal of Xeloda pump   Gastrointestinal: Positive for nausea (anticipatory at times). Negative for constipation, diarrhea and vomiting.        Output normal per ostomy   Genitourinary: Negative for dysuria and hematuria.   Musculoskeletal: Negative for myalgias.   Skin: Negative for itching and rash.        Dry skin to her hands and feet - no cracking or peeling   Neurological: Positive for dizziness. Negative for tingling and headaches.   Psychiatric/Behavioral: The patient has insomnia (chronic).           Objective:     /72   Pulse 92   Temp 36.6 °C (97.8 °F) (Temporal)   Resp 16   Ht 1.68 m (5' 6.14\")   Wt 73.6 kg (162 lb 5.9 oz)   LMP  (LMP Unknown)   SpO2 99%   BMI 26.09 kg/m²      Physical Exam  Vitals signs reviewed.   Constitutional:       General: She is not in acute distress.     Appearance: Normal appearance. She is not diaphoretic.   HENT:      Head: Normocephalic and atraumatic.   Cardiovascular:      Rate and Rhythm: Normal rate and regular rhythm.      Pulses: Normal " pulses.      Heart sounds: No murmur. No friction rub. No gallop.    Pulmonary:      Effort: Pulmonary effort is normal. No respiratory distress.      Breath sounds: Normal breath sounds. No wheezing.   Abdominal:      General: Bowel sounds are normal. There is no distension.      Palpations: Abdomen is soft.      Tenderness: There is no abdominal tenderness.   Musculoskeletal: Normal range of motion.         General: No swelling or tenderness.   Skin:     General: Skin is warm and dry.   Neurological:      Mental Status: She is alert and oriented to person, place, and time.   Psychiatric:         Mood and Affect: Mood normal.         Behavior: Behavior normal.         Results for KANG HALL (MRN [id number])    Ref. Range 9/22/2020 07:20   WBC Latest Ref Range: 4.8 - 10.8 K/uL 7.2   RBC Latest Ref Range: 4.20 - 5.40 M/uL 4.50   Hemoglobin Latest Ref Range: 12.0 - 16.0 g/dL 12.9   Hematocrit Latest Ref Range: 37.0 - 47.0 % 40.6   MCV Latest Ref Range: 81.4 - 97.8 fL 90.2   MCH Latest Ref Range: 27.0 - 33.0 pg 28.7   MCHC Latest Ref Range: 33.6 - 35.0 g/dL 31.8 (L)   RDW Latest Ref Range: 35.9 - 50.0 fL 70.4 (H)   Platelet Count Latest Ref Range: 164 - 446 K/uL 223   MPV Latest Ref Range: 9.0 - 12.9 fL 10.2   Nucleated RBC Latest Units: /100 WBC 0.00   NRBC (Absolute) Latest Units: K/uL 0.00   Sodium Latest Ref Range: 135 - 145 mmol/L 136   Potassium Latest Ref Range: 3.6 - 5.5 mmol/L 3.8   Chloride Latest Ref Range: 96 - 112 mmol/L 101   Co2 Latest Ref Range: 20 - 33 mmol/L 23   Anion Gap Latest Ref Range: 7.0 - 16.0  12.0   Glucose Latest Ref Range: 65 - 99 mg/dL 103 (H)   Bun Latest Ref Range: 8 - 22 mg/dL 15   Creatinine Latest Ref Range: 0.50 - 1.40 mg/dL 0.95   GFR If  Latest Ref Range: >60 mL/min/1.73 m 2 >60   GFR If Non  Latest Ref Range: >60 mL/min/1.73 m 2 57 (A)   Calcium Latest Ref Range: 8.5 - 10.5 mg/dL 9.2   AST(SGOT) Latest Ref Range: 12 - 45 U/L 22   ALT(SGPT)  Latest Ref Range: 2 - 50 U/L 19   Alkaline Phosphatase Latest Ref Range: 30 - 99 U/L 164 (H)   Total Bilirubin Latest Ref Range: 0.1 - 1.5 mg/dL 1.0   Albumin Latest Ref Range: 3.2 - 4.9 g/dL 3.6   Total Protein Latest Ref Range: 6.0 - 8.2 g/dL 6.6   Globulin Latest Ref Range: 1.9 - 3.5 g/dL 3.0   A-G Ratio Latest Units: g/dL 1.2   Carcinoembryonic Antigen Latest Ref Range: 0.0 - 3.0 ng/mL 1.2          Assessment/Plan:        1. Malignant neoplasm of rectum (HCC)     2. Secondary malignant neoplasm of liver (HCC)     3. Encounter for long-term current use of high risk medication     4. History of pulmonary embolus (PE)     5. Current use of long term anticoagulation         Plan  1.  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  She is scheduled to proceed with treatment today.  Clinically patient is very stable and I did review her CBC and CMP and labs are appropriate to proceed with treatment as planned.  CEA lab is being completed monthly, and her last one was stable at 1.3.    Patient is currently scheduled for follow-up CT scan on 10/5/2020 with Dr. West, prior to her next cycle of chemotherapy.    2.  Patient with history of pulmonary embolism and has been on Xarelto for quite some time.  She is on a low dose of 10 mg p.o. daily and tolerating it well.  She is to continue on this medication.    3.  Patient to follow-up with cardiology as planned.    4.  Patient to follow-up in the clinic in 2 weeks or sooner if needed.

## 2020-09-22 NOTE — PROGRESS NOTES
Pt returns to [facility] for Day 1 cycle 105 5FU CADD pump connect.  Pt arrived with left upper chest port accessed and intact.  PORT flushes easily with brisk blood return.  VS, MD orders and labs reviewed, pt within parameters to receive treatment today.  Pre-medications administered as ordered, 5FU CADD pump connected, all clamps unclamped, CADD pump in RUN position.  Pt verbalizes understanding of CADD pump, and has home chemo spill kit.  Pt discharged to self care in [facility], next appointment confirmed.

## 2020-09-22 NOTE — PROGRESS NOTES
Chemotherapy Verification - PRIMARY RN      Height = 164 cm  Weight = 73.9 kg  BSA = 1.83 m2       Medication: fluorouracil  Dose: 1920 mg/m2  Calculated Dose: 3513.6 mg                              I confirm this process was performed independently with the BSA and all final chemotherapy dosing calculations congruent.  Any discrepancies of 10% or greater have been addressed with the chemotherapy pharmacist. The resolution of the discrepancy has been documented in the EPIC progress notes.

## 2020-09-22 NOTE — PROGRESS NOTES
Chemotherapy Verification - SECONDARY RN       Height = 164 cm  Weight = 73.9 kg  BSA = 1.83 m2       Medication: Adrucil  Dose: 1,920 mg/m2  Calculated Dose: 3.513.6 mg over 46 hours                             (In mg/m2, AUC, mg/kg)         I confirm that this process was performed independently.

## 2020-09-23 ENCOUNTER — OFFICE VISIT (OUTPATIENT)
Dept: CARDIOLOGY | Facility: MEDICAL CENTER | Age: 80
End: 2020-09-23
Payer: MEDICARE

## 2020-09-23 VITALS
SYSTOLIC BLOOD PRESSURE: 100 MMHG | BODY MASS INDEX: 28.34 KG/M2 | HEART RATE: 74 BPM | HEIGHT: 64 IN | DIASTOLIC BLOOD PRESSURE: 70 MMHG | RESPIRATION RATE: 18 BRPM | WEIGHT: 166 LBS | OXYGEN SATURATION: 97 %

## 2020-09-23 DIAGNOSIS — Z86.711 HISTORY OF PULMONARY EMBOLISM: ICD-10-CM

## 2020-09-23 DIAGNOSIS — R19.7 DIARRHEA, UNSPECIFIED TYPE: ICD-10-CM

## 2020-09-23 DIAGNOSIS — R94.4 DECREASED GFR: ICD-10-CM

## 2020-09-23 DIAGNOSIS — N28.9 RENAL INSUFFICIENCY: ICD-10-CM

## 2020-09-23 DIAGNOSIS — K76.9 LIVER DISEASE: ICD-10-CM

## 2020-09-23 DIAGNOSIS — R60.0 LOCALIZED EDEMA: ICD-10-CM

## 2020-09-23 DIAGNOSIS — C20 MALIGNANT NEOPLASM OF RECTUM (HCC): Chronic | ICD-10-CM

## 2020-09-23 DIAGNOSIS — Z79.899 HIGH RISK MEDICATION USE: ICD-10-CM

## 2020-09-23 DIAGNOSIS — R06.02 SHORTNESS OF BREATH: ICD-10-CM

## 2020-09-23 DIAGNOSIS — D64.9 ANEMIA, UNSPECIFIED TYPE: ICD-10-CM

## 2020-09-23 DIAGNOSIS — I10 ESSENTIAL HYPERTENSION: ICD-10-CM

## 2020-09-23 DIAGNOSIS — T45.1X5A CHEMOTHERAPY-INDUCED NEUTROPENIA (HCC): ICD-10-CM

## 2020-09-23 DIAGNOSIS — R73.9 HYPERGLYCEMIA: ICD-10-CM

## 2020-09-23 DIAGNOSIS — R94.31 ABNORMAL EKG: ICD-10-CM

## 2020-09-23 DIAGNOSIS — E86.0 DEHYDRATION: ICD-10-CM

## 2020-09-23 DIAGNOSIS — R74.8 ELEVATED ALKALINE PHOSPHATASE LEVEL: ICD-10-CM

## 2020-09-23 DIAGNOSIS — C78.7 SECONDARY MALIGNANT NEOPLASM OF LIVER (HCC): Chronic | ICD-10-CM

## 2020-09-23 DIAGNOSIS — Z86.711 HISTORY OF PULMONARY EMBOLUS (PE): ICD-10-CM

## 2020-09-23 DIAGNOSIS — E78.5 HYPERLIPIDEMIA, UNSPECIFIED HYPERLIPIDEMIA TYPE: ICD-10-CM

## 2020-09-23 DIAGNOSIS — D70.1 CHEMOTHERAPY-INDUCED NEUTROPENIA (HCC): ICD-10-CM

## 2020-09-23 PROCEDURE — 99214 OFFICE O/P EST MOD 30 MIN: CPT | Performed by: INTERNAL MEDICINE

## 2020-09-23 RX ORDER — POTASSIUM CHLORIDE 750 MG/1
10 TABLET, FILM COATED, EXTENDED RELEASE ORAL DAILY
Qty: 100 TAB | Refills: 3 | Status: SHIPPED | OUTPATIENT
Start: 2020-09-23 | End: 2021-05-12 | Stop reason: SDUPTHER

## 2020-09-23 ASSESSMENT — ENCOUNTER SYMPTOMS
CHILLS: 0
RESPIRATORY NEGATIVE: 1
CARDIOVASCULAR NEGATIVE: 1
FEVER: 0
STRIDOR: 0
NEUROLOGICAL NEGATIVE: 1
DIZZINESS: 0
CONSTITUTIONAL NEGATIVE: 1
COUGH: 0
LOSS OF CONSCIOUSNESS: 0
EYES NEGATIVE: 1
SHORTNESS OF BREATH: 0
WHEEZING: 0
PALPITATIONS: 0
CLAUDICATION: 0
WEAKNESS: 0
MUSCULOSKELETAL NEGATIVE: 1
PND: 0
ORTHOPNEA: 0
SORE THROAT: 0
HEMOPTYSIS: 0
BRUISES/BLEEDS EASILY: 0
SPUTUM PRODUCTION: 0
GASTROINTESTINAL NEGATIVE: 1

## 2020-09-23 ASSESSMENT — FIBROSIS 4 INDEX: FIB4 SCORE: 1.81

## 2020-09-23 NOTE — PROGRESS NOTES
Chief Complaint   Patient presents with   • HTN (Controlled)   • Hyperlipidemia   • Shortness of Breath       Subjective:   Karlene Walters is a 79 y.o. female who presents today as a follow-up for her PE heart murmur shortness of breath and lower extremity edema.  Since she will last was here she underwent abdominal surgery for that metastatic disease to her liver.  This was not able to be concluded secondary to a metastasis around her aorta.  Otherwise she is been doing well with no shortness of breath or lower extremity edema.    Past Medical History:   Diagnosis Date   • Adverse effect of anesthesia     elevated BP postop   • Anesthesia    • Arrhythmia     occasional   • Blood clotting disorder (HCC) 08/2015    bilat PE    • Blood transfusion 1957   • Breath shortness     pt reports d/t chemo treatment; no O2; with moderate exertion; no c/o at this time   • Cancer (HCC) 09/2012    rectal cancer,  Liver CA-2015   • CATARACT     removed b/l   • Chemotherapy-induced neutropenia (HCC) 9/28/2016   • Chickenpox     history of   • Colon cancer (HCC)    • Dental disorder     dentures UPPERS AND LOWERS   • Elevated alkaline phosphatase level 11/15/2017   • Emphysema of lung (HCC)     COPD   • Fall    • Greek measles     history of   • Heart murmur     as a child   • Hemorrhagic disorder (HCC)     on Xarelto    • High cholesterol    • Hypercholesteremia    • Hypertension     no meds currently    • Ileostomy in place (HCC)    • Ileostomy in place (HCC)    • Indigestion    • Influenza     history of   • Lightheadedness 9/17/2015   • Mumps     history of   • Obstruction     ileostomy   • Other specified symptom associated with female genital organs     HYSTERECTOMY 1993   • Pneumonia 05/2017    tx'd with antibx   • Pulmonary embolism (HCC)    • 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)    • Renal insufficiency 3/14/2016   • Restless legs 5/6/2020   • Routine general medical examination at a health care facility  3/14/2016    3/14/16   • Seasonal allergies    • Swelling of both ankles     Lasix PRN   • Tonsillitis      Past Surgical History:   Procedure Laterality Date   • HEPATIC ABLATION  5/28/2020    Procedure: ABLATION, LESION, LIVER-TRISEGMENTECTOMY, MICROWAVE ABLATION, INTRA OPERATIVE ULTRA SOUND, SIMPLE RESECTED / REPAIR OF DIAPHRAGM;  Surgeon: Kit West M.D.;  Location: SURGERY [facility];  Service: General   • HEPATIC ABLATION LAPAROSCOPIC  11/15/2018    Procedure: HEPATIC ABLATION LAPAROSCOPIC.- SEGMENT 7/8;  Surgeon: Kit West M.D.;  Location: SURGERY [facility];  Service: General   • COLONOSCOPY WITH BIOPSY  8/23/2015    Procedure: COLONOSCOPY WITH BIOPSY;  Surgeon: Wilbur Glasgow M.D.;  Location: ENDOSCOPY [facility];  Service:    • HEPATIC ABLATION LAPAROSCOPIC  7/6/2015    Procedure: HEPATIC ABLATION LAPAROSCOPIC.;  Surgeon: Kit West M.D.;  Location: SURGERY [facility];  Service:    • CATH PLACEMENT Left 7/6/2015    Procedure: CATH PLACEMENT CEPHALIC POWERPORT;  Surgeon: Kit West M.D.;  Location: SURGERY [facility];  Service:    • FISTULA IN ANO REPAIR  1/28/2015    Performed by Kolton Montgomery M.D. at [facility]   • PERINEAL PROCEDURE  1/28/2015    Performed by Kolton Montgomery M.D. at [facility]   • FISTULA IN ANO REPAIR  10/15/2014    Performed by Kolton Montgomery M.D. at [facility]   • PERINEAL PROCEDURE  10/15/2014    Performed by Kolton Montgomery M.D. at [facility]   • IRRIGATION & DEBRIDEMENT GENERAL  2/19/2014    Performed by Kolton Montgomery M.D. at [facility]   • FLAP GRAFT  2/19/2014    Performed by Kolton Montgomery M.D. at [facility]   • PERINEAL PROCEDURE  12/18/2013    Performed by Kolton Montgomery M.D. at [facility]   • MYOCUTANEOUS FLAP  12/18/2013    Performed by Kolton Montgomery M.D. at [facility]   • IRRIGATION & DEBRIDEMENT  GENERAL  10/23/2013    Performed by Kolton Montgomery M.D. at SURGERY [facility]   • FISTULA IN ANO REPAIR  9/4/2013    Performed by Kolton Montgomery M.D. at SURGERY [facility]   • FLAP ROTATION  9/4/2013    Performed by Kolton Montgomery M.D. at SURGERY [facility]   • RECOVERY  8/26/2013    Performed by Cath-Recovery Surgery at [facility] SAME DAY [facility]   • BIOPSY GENERAL  7/17/2013    Performed by Kolton Montgomery M.D. at SURGERY [facility]   • PROCTOSCOPY  7/17/2013    Performed by Kolton Montgomery M.D. at SURGERY [facility]   • FISTULA IN ANO REPAIR  2/27/2013    Performed by Kolton Montgomery M.D. at SURGERY [facility]   • SIGMOIDOSCOPY  2/27/2013    Performed by Kolton Montgomery M.D. at SURGERY [facility]   • LAPAROSCOPY  10/8/2012    Performed by Kolton Montgomery M.D. at SURGERY [facility]   • ILEO LOOP DIVERSION  10/8/2012    Performed by Kolton Montgomery M.D. at SURGERY [facility]   • COLECTOMY  9/26/2012    Performed by Kolton Montgomery M.D. at SURGERY [facility]   • COLONOSCOPY FLEX W/ENDO US  9/20/2012    Performed by Harshil Bolton M.D. at SURGERY [facility]   • OTHER  2009    left eye torn retina repair   • CATARACT EXTRACTION WITH IOL  2004    bilateral   • ABDOMINAL HYSTERECTOMY TOTAL  1983   • CYST EXCISION  1972    ovarian   • COLON RESECTION     • EYE SURGERY      cataracts   • HYSTERECTOMY LAPAROSCOPY     • PB REMV 2ND CATARACT,CORN-SCLER SECTN     • TONSILLECTOMY       Family History   Problem Relation Age of Onset   • Heart Disease Mother    • Heart Failure Mother    • Hypertension Mother    • Hyperlipidemia Mother    • Cancer Mother    • Cancer Maternal Aunt 60        breast ca   • Lung Disease Brother         COPD/Emphysema/Smoker   • Cancer Brother         prostate cancer   • Alcohol/Drug Brother         Alcohol   • Kidney Disease Father         renal failure     Social History     Socioeconomic History   • Marital status:      Spouse name: Not on file   •  "Number of children: Not on file   • Years of education: Not on file   • Highest education level: Not on file   Occupational History   • Not on file   Social Needs   • Financial resource strain: Not hard at all   • Food insecurity     Worry: Never true     Inability: Never true   • Transportation needs     Medical: No     Non-medical: No   Tobacco Use   • Smoking status: Never Smoker   • Smokeless tobacco: Never Used   Substance and Sexual Activity   • Alcohol use: No   • Drug use: No   • Sexual activity: Never     Partners: Male     Birth control/protection: Post-Menopausal   Lifestyle   • Physical activity     Days per week: Not on file     Minutes per session: Not on file   • Stress: Not on file   Relationships   • Social connections     Talks on phone: Not on file     Gets together: Not on file     Attends [employer] service: Not on file     Active member of club or organization: Not on file     Attends meetings of clubs or organizations: Not on file     Relationship status: Not on file   • Intimate partner violence     Fear of current or ex partner: Not on file     Emotionally abused: Not on file     Physically abused: Not on file     Forced sexual activity: Not on file   Other Topics Concern   • Primary/coprimary nurse & associates Not Asked   • Family contact information Not Asked   • OK to release patient information to the following Not Asked   • Patient preferred routine/Privacy concerns Not Asked   • Patient likes and dislikes Not Asked   • Participating In Research Study Not Asked   • Miscellaneous Not Asked   Social History Narrative   • Not on file     Allergies   Allergen Reactions   • Oxaliplatin Anaphylaxis   • Codeine      \"gets drunk\"   • Pcn [Penicillins] Itching     itching   • Sulfa Drugs Itching     itching   • Tape Rash     PAPER TAPE OK     Outpatient Encounter Medications as of 9/23/2020   Medication Sig Dispense Refill   • potassium chloride ER (KLOR-CON) 10 MEQ tablet Take 1 Tab by mouth " every day. 100 Tab 3   • rivaroxaban (XARELTO) 10 MG Tab tablet Take 1 Tab by mouth every day. 30 Tab 5   • ondansetron (ZOFRAN) 4 MG Tab tablet Take 1 Tab by mouth every four hours as needed. 30 Tab 5   • lidocaine-prilocaine (EMLA) 2.5-2.5 % Cream Apply to port 1 hour prior to access of port and cover with plastic wrap. 30 g 3   • Tiotropium Bromide-Olodaterol (STIOLTO RESPIMAT) 2.5-2.5 MCG/ACT Aero Soln Take 2 Puffs by mouth every day. 1 Inhaler 11   • albuterol 108 (90 Base) MCG/ACT Aero Soln inhalation aerosol Inhale 2 Puffs by mouth every 6 hours as needed for Shortness of Breath. 8.5 g 11   • metronidazole (METROCREAM) 0.75 % cream Apply 1 Each to affected area(s) 2 times a day.     • cyanocobalamin (VITAMIN B-12) 500 MCG Tab Take 1,000 mcg by mouth every day.     • Multiple Vitamins-Minerals (CENTRUM SILVER PO) Take 1 Tab by mouth every day.     • Cholecalciferol (VITAMIN D3) 400 UNITS CAPS Take 1 Cap by mouth every day.     • loratadine (CLARITIN) 10 MG TABS Take 10 mg by mouth every day. Indications: Hayfever     • [DISCONTINUED] potassium chloride ER (KLOR-CON) 10 MEQ tablet Take 1 Tab by mouth every day. 100 Tab 3     No facility-administered encounter medications on file as of 9/23/2020.      Review of Systems   Constitutional: Negative.  Negative for chills, fever and malaise/fatigue.   HENT: Negative.  Negative for sore throat.    Eyes: Negative.    Respiratory: Negative.  Negative for cough, hemoptysis, sputum production, shortness of breath, wheezing and stridor.    Cardiovascular: Negative.  Negative for chest pain, palpitations, orthopnea, claudication, leg swelling and PND.   Gastrointestinal: Negative.    Genitourinary: Negative.    Musculoskeletal: Negative.    Skin: Negative.    Neurological: Negative.  Negative for dizziness, loss of consciousness and weakness.   Endo/Heme/Allergies: Negative.  Does not bruise/bleed easily.   All other systems reviewed and are negative.       Objective:   BP  "100/70 (BP Location: Left arm, Patient Position: Sitting, BP Cuff Size: Adult)   Pulse 74   Resp 18   Ht 1.626 m (5' 4\")   Wt 75.3 kg (166 lb)   LMP  (LMP Unknown)   SpO2 97%   BMI 28.49 kg/m²     Physical Exam   Constitutional: She appears well-developed and well-nourished. No distress.   HENT:   Head: Normocephalic and atraumatic.   Right Ear: External ear normal.   Left Ear: External ear normal.   Nose: Nose normal.   Mouth/Throat: No oropharyngeal exudate.   Eyes: Pupils are equal, round, and reactive to light. Conjunctivae and EOM are normal. Right eye exhibits no discharge. Left eye exhibits no discharge. No scleral icterus.   Neck: Neck supple. No JVD present.   Cardiovascular: Normal rate, regular rhythm and intact distal pulses. Exam reveals no gallop and no friction rub.   No murmur heard.  Pulmonary/Chest: Effort normal. No stridor. No respiratory distress. She has no wheezes. She has no rales. She exhibits no tenderness.   Abdominal: Soft. She exhibits no distension. There is no guarding.   Musculoskeletal: Normal range of motion.         General: No tenderness, deformity or edema.   Neurological: She is alert. She has normal reflexes. She displays normal reflexes. No cranial nerve deficit. She exhibits normal muscle tone. Coordination normal.   Skin: Skin is warm and dry. No rash noted. She is not diaphoretic. No erythema. No pallor.   Psychiatric: She has a normal mood and affect. Her behavior is normal. Judgment and thought content normal.   Nursing note and vitals reviewed.      Assessment:     1. Decreased GFR     2. Dehydration     3. Diarrhea, unspecified type     4. Elevated alkaline phosphatase level     5. History of pulmonary embolus (PE)     6. Hyperglycemia     7. Hyperlipidemia, unspecified hyperlipidemia type     8. Essential hypertension     9. Chemotherapy-induced neutropenia (HCC)     10. Abnormal EKG     11. Anemia, unspecified type  potassium chloride ER (KLOR-CON) 10 MEQ " tablet   12. Liver disease     13. Localized edema  potassium chloride ER (KLOR-CON) 10 MEQ tablet   14. Malignant neoplasm of rectum (HCC)     15. Renal insufficiency     16. Secondary malignant neoplasm of liver (HCC)     17. Shortness of breath     18. High risk medication use     19. History of pulmonary embolism  potassium chloride ER (KLOR-CON) 10 MEQ tablet       Medical Decision Making:  Today's Assessment / Status / Plan:     79-year-old female with PEs anemia edema shortness of breath and high risk medication usage.  From a cardiac standpoint she continues to do well.  I refilled her potassium.  She still on Xarelto.  Otherwise I will see her back in 6 months.    1. Orthostatic tachycardia  - resolved     2. Shortness of breath with exertion and PEs, normal echo  - cont Rivaroxiban 10     3. LE Edema    - lasix 40 daily    - KCL 10 mEq daily    4. High Risk Meds    - labs reviewed

## 2020-09-24 ENCOUNTER — OUTPATIENT INFUSION SERVICES (OUTPATIENT)
Dept: ONCOLOGY | Facility: MEDICAL CENTER | Age: 80
End: 2020-09-24
Attending: INTERNAL MEDICINE
Payer: MEDICARE

## 2020-09-24 VITALS
SYSTOLIC BLOOD PRESSURE: 143 MMHG | TEMPERATURE: 97.1 F | HEART RATE: 98 BPM | OXYGEN SATURATION: 98 % | DIASTOLIC BLOOD PRESSURE: 62 MMHG | RESPIRATION RATE: 18 BRPM | WEIGHT: 164.24 LBS | BODY MASS INDEX: 27.36 KG/M2 | HEIGHT: 65 IN

## 2020-09-24 DIAGNOSIS — C20 MALIGNANT NEOPLASM OF RECTUM (HCC): ICD-10-CM

## 2020-09-24 PROCEDURE — 96523 IRRIG DRUG DELIVERY DEVICE: CPT

## 2020-09-24 PROCEDURE — 700111 HCHG RX REV CODE 636 W/ 250 OVERRIDE (IP): Performed by: INTERNAL MEDICINE

## 2020-09-24 RX ORDER — HEPARIN SODIUM (PORCINE) LOCK FLUSH IV SOLN 100 UNIT/ML 100 UNIT/ML
500 SOLUTION INTRAVENOUS PRN
Status: DISCONTINUED | OUTPATIENT
Start: 2020-09-24 | End: 2020-09-24 | Stop reason: HOSPADM

## 2020-09-24 RX ADMIN — HEPARIN 500 UNITS: 100 SYRINGE at 11:43

## 2020-09-24 ASSESSMENT — FIBROSIS 4 INDEX: FIB4 SCORE: 1.81

## 2020-09-24 NOTE — PROGRESS NOTES
Karlene returned to [facility] for her 5FU pump disconnect. Pump disconnected, blood return noted in port, port flushed, heparinized, and de-accessed. Patient discharged stable and ambulatory.

## 2020-10-02 RX ORDER — LIDOCAINE HYDROCHLORIDE 10 MG/ML
10 INJECTION, SOLUTION INFILTRATION; PERINEURAL ONCE
Status: CANCELLED | OUTPATIENT
Start: 2020-10-06

## 2020-10-02 RX ORDER — PROCHLORPERAZINE MALEATE 10 MG
10 TABLET ORAL EVERY 6 HOURS PRN
Status: CANCELLED | OUTPATIENT
Start: 2020-10-06

## 2020-10-02 RX ORDER — HEPARIN SODIUM (PORCINE) LOCK FLUSH IV SOLN 100 UNIT/ML 100 UNIT/ML
500 SOLUTION INTRAVENOUS PRN
Status: CANCELLED | OUTPATIENT
Start: 2020-10-08

## 2020-10-02 RX ORDER — DEXTROSE MONOHYDRATE 50 MG/ML
INJECTION, SOLUTION INTRAVENOUS CONTINUOUS
Status: CANCELLED | OUTPATIENT
Start: 2020-10-06

## 2020-10-02 RX ORDER — ONDANSETRON 2 MG/ML
4 INJECTION INTRAMUSCULAR; INTRAVENOUS
Status: CANCELLED | OUTPATIENT
Start: 2020-10-06

## 2020-10-02 RX ORDER — ONDANSETRON 8 MG/1
8 TABLET, ORALLY DISINTEGRATING ORAL
Status: CANCELLED | OUTPATIENT
Start: 2020-10-06

## 2020-10-02 RX ORDER — DEXAMETHASONE SODIUM PHOSPHATE 4 MG/ML
8 INJECTION, SOLUTION INTRA-ARTICULAR; INTRALESIONAL; INTRAMUSCULAR; INTRAVENOUS; SOFT TISSUE ONCE
Status: CANCELLED | OUTPATIENT
Start: 2020-10-06

## 2020-10-02 RX ORDER — HEPARIN SODIUM (PORCINE) LOCK FLUSH IV SOLN 100 UNIT/ML 100 UNIT/ML
500 SOLUTION INTRAVENOUS PRN
Status: CANCELLED | OUTPATIENT
Start: 2020-10-06

## 2020-10-05 ENCOUNTER — HOSPITAL ENCOUNTER (OUTPATIENT)
Dept: RADIOLOGY | Facility: MEDICAL CENTER | Age: 80
End: 2020-10-05
Attending: SURGERY | Admitting: SURGERY
Payer: MEDICARE

## 2020-10-05 ENCOUNTER — OUTPATIENT INFUSION SERVICES (OUTPATIENT)
Dept: ONCOLOGY | Facility: MEDICAL CENTER | Age: 80
End: 2020-10-05
Attending: INTERNAL MEDICINE
Payer: MEDICARE

## 2020-10-05 VITALS
TEMPERATURE: 98 F | SYSTOLIC BLOOD PRESSURE: 129 MMHG | RESPIRATION RATE: 18 BRPM | OXYGEN SATURATION: 98 % | HEART RATE: 77 BPM | DIASTOLIC BLOOD PRESSURE: 70 MMHG

## 2020-10-05 DIAGNOSIS — C20 MALIGNANT NEOPLASM OF RECTUM (HCC): ICD-10-CM

## 2020-10-05 DIAGNOSIS — K76.9 LIVER DISEASE: ICD-10-CM

## 2020-10-05 LAB
ALBUMIN SERPL BCP-MCNC: 3.7 G/DL (ref 3.2–4.9)
ALBUMIN/GLOB SERPL: 1.1 G/DL
ALP SERPL-CCNC: 180 U/L (ref 30–99)
ALT SERPL-CCNC: 22 U/L (ref 2–50)
ANION GAP SERPL CALC-SCNC: 14 MMOL/L (ref 7–16)
ANISOCYTOSIS BLD QL SMEAR: ABNORMAL
AST SERPL-CCNC: 25 U/L (ref 12–45)
BASOPHILS # BLD AUTO: 0.6 % (ref 0–1.8)
BASOPHILS # BLD: 0.05 K/UL (ref 0–0.12)
BILIRUB SERPL-MCNC: 0.6 MG/DL (ref 0.1–1.5)
BUN SERPL-MCNC: 18 MG/DL (ref 8–22)
CALCIUM SERPL-MCNC: 9.6 MG/DL (ref 8.5–10.5)
CEA SERPL-MCNC: 1.4 NG/ML (ref 0–3)
CHLORIDE SERPL-SCNC: 101 MMOL/L (ref 96–112)
CO2 SERPL-SCNC: 21 MMOL/L (ref 20–33)
COMMENT 1642: NORMAL
CREAT SERPL-MCNC: 1.03 MG/DL (ref 0.5–1.4)
EOSINOPHIL # BLD AUTO: 0.2 K/UL (ref 0–0.51)
EOSINOPHIL NFR BLD: 2.5 % (ref 0–6.9)
ERYTHROCYTE [DISTWIDTH] IN BLOOD BY AUTOMATED COUNT: 70.5 FL (ref 35.9–50)
GLOBULIN SER CALC-MCNC: 3.3 G/DL (ref 1.9–3.5)
GLUCOSE SERPL-MCNC: 94 MG/DL (ref 65–99)
HCT VFR BLD AUTO: 40.9 % (ref 37–47)
HGB BLD-MCNC: 12.8 G/DL (ref 12–16)
IMM GRANULOCYTES # BLD AUTO: 0.04 K/UL (ref 0–0.11)
IMM GRANULOCYTES NFR BLD AUTO: 0.5 % (ref 0–0.9)
LYMPHOCYTES # BLD AUTO: 1.69 K/UL (ref 1–4.8)
LYMPHOCYTES NFR BLD: 21.4 % (ref 22–41)
MACROCYTES BLD QL SMEAR: ABNORMAL
MCH RBC QN AUTO: 28.3 PG (ref 27–33)
MCHC RBC AUTO-ENTMCNC: 31.3 G/DL (ref 33.6–35)
MCV RBC AUTO: 90.3 FL (ref 81.4–97.8)
MICROCYTES BLD QL SMEAR: ABNORMAL
MONOCYTES # BLD AUTO: 0.93 K/UL (ref 0–0.85)
MONOCYTES NFR BLD AUTO: 11.8 % (ref 0–13.4)
MORPHOLOGY BLD-IMP: NORMAL
NEUTROPHILS # BLD AUTO: 4.97 K/UL (ref 2–7.15)
NEUTROPHILS NFR BLD: 63.2 % (ref 44–72)
NRBC # BLD AUTO: 0 K/UL
NRBC BLD-RTO: 0 /100 WBC
OVALOCYTES BLD QL SMEAR: NORMAL
PLATELET # BLD AUTO: 248 K/UL (ref 164–446)
PLATELET BLD QL SMEAR: NORMAL
PMV BLD AUTO: 9.7 FL (ref 9–12.9)
POIKILOCYTOSIS BLD QL SMEAR: NORMAL
POTASSIUM SERPL-SCNC: 4.1 MMOL/L (ref 3.6–5.5)
PROT SERPL-MCNC: 7 G/DL (ref 6–8.2)
RBC # BLD AUTO: 4.53 M/UL (ref 4.2–5.4)
RBC BLD AUTO: PRESENT
SODIUM SERPL-SCNC: 136 MMOL/L (ref 135–145)
WBC # BLD AUTO: 7.9 K/UL (ref 4.8–10.8)

## 2020-10-05 PROCEDURE — 85025 COMPLETE CBC W/AUTO DIFF WBC: CPT

## 2020-10-05 PROCEDURE — A4212 NON CORING NEEDLE OR STYLET: HCPCS

## 2020-10-05 PROCEDURE — 700111 HCHG RX REV CODE 636 W/ 250 OVERRIDE (IP): Performed by: NURSE PRACTITIONER

## 2020-10-05 PROCEDURE — 700117 HCHG RX CONTRAST REV CODE 255: Performed by: SURGERY

## 2020-10-05 PROCEDURE — 36591 DRAW BLOOD OFF VENOUS DEVICE: CPT

## 2020-10-05 PROCEDURE — 80053 COMPREHEN METABOLIC PANEL: CPT

## 2020-10-05 PROCEDURE — 700111 HCHG RX REV CODE 636 W/ 250 OVERRIDE (IP): Performed by: RADIOLOGY

## 2020-10-05 PROCEDURE — 82378 CARCINOEMBRYONIC ANTIGEN: CPT

## 2020-10-05 PROCEDURE — 74160 CT ABDOMEN W/CONTRAST: CPT

## 2020-10-05 RX ORDER — HEPARIN SODIUM (PORCINE) LOCK FLUSH IV SOLN 100 UNIT/ML 100 UNIT/ML
300-500 SOLUTION INTRAVENOUS PRN
Status: DISCONTINUED | OUTPATIENT
Start: 2020-10-05 | End: 2020-10-06 | Stop reason: HOSPADM

## 2020-10-05 RX ORDER — HEPARIN SODIUM (PORCINE) LOCK FLUSH IV SOLN 100 UNIT/ML 100 UNIT/ML
500 SOLUTION INTRAVENOUS PRN
Status: DISCONTINUED | OUTPATIENT
Start: 2020-10-05 | End: 2020-10-05 | Stop reason: HOSPADM

## 2020-10-05 RX ADMIN — HEPARIN 500 UNITS: 100 SYRINGE at 10:13

## 2020-10-05 RX ADMIN — IOHEXOL 100 ML: 350 INJECTION, SOLUTION INTRAVENOUS at 11:17

## 2020-10-05 RX ADMIN — HEPARIN 500 UNITS: 100 SYRINGE at 11:28

## 2020-10-05 NOTE — PROGRESS NOTES
Pt to infusion services ambulatory per self.  Pt here for scheduled port access and pre chemo labs.  Plan of care reviewed.  Pt verbalizes understanding. Pt reports no issues or concerns.  Pt with R chest port with EMLA cream in place.  Port site cleansed and port accessed in sterile fashion.  Port flushes easily; + brisk blood return verified.  Labs drawn per MD orders.  Port flushed with normal saline and heparin per policy.  Pt left infusion services in no apparent distress after completion of treatment, ambulatory.  Next appointment scheduled.

## 2020-10-05 NOTE — PROGRESS NOTES
"Pharmacy Chemotherapy Verification Note:    Patient Name: Karlene Walters      Dx: Colon Ca      Protocol: 5-FU +        *Dosing Reference*                                    -- 10/31/17: delete Leucovorin and 5-FU push d/t neutropenia per Dr. Hardy   Fluorouracil 2400 mg/m² continuous infusion over 46-48 hours                              -- 20% reduction (1920 mg/m²) to be continued starting C28 per C Alsop APRN   14 day cycles for 12 cycles (adjuvant) or until disease progression or unacceptable toxicity    NCCN Guidelines for Colon Cancer. V.2.2015.  Jay GROVER, et al. Eur J Cancer.1999;35(9):1343-7.     Allergies:  Oxaliplatin; Codeine; Pcn [penicillins]; Sulfa drugs; and Tape     /66   Pulse 100   Temp 36.7 °C (98 °F) (Temporal)   Resp 18   Ht 1.64 m (5' 4.57\")   Wt 73.3 kg (161 lb 9.6 oz)   LMP  (LMP Unknown)   SpO2 100%   BMI 27.25 kg/m²  Body surface area is 1.83 meters squared.    Labs 10/5/20:  ANC~ 4970 Plt = 248k   Hgb = 12.8     SCr = 1.03mg/dL CrCl ~ 50 mL/min   AST/ALT/ALK = 25/22/180 TBili = 0.6      Drug Order   (Drug name, dose, route, IV Fluid & volume, frequency, number of doses) Cycle 114 ([place] cycle 106)     Previous treatment: 9/22/20     Medication = Fluorouracil (5-FU)  Base Dose = 1920 mg/m²  Calc Dose: Base Dose x 1.83 m² =  3513.6 mg  Final Dose = 3515 mg  Route = CIVI   Fluid & Volume = 70.3 mL (+ 3 mL overfill =   73.3 mL)  Admin Duration = Over 46 hours (to run at 1.5 mL/hour)   Via CADD pump for home infusion       <10% difference, okay to treat with final written dose     By my signature below, I confirm this process was performed independently with the BSA and all final chemotherapy dosing calculations congruent. I have reviewed the above chemotherapy order and that my calculation of the final dose and BSA (when applicable) corroborate those calculations of the  pharmacist.     Asha Krishnan, PharmD    "

## 2020-10-05 NOTE — PROGRESS NOTES
Subjective:   10/6/2020 12:59 PM  Primary care physician:Manan Quiñonez M.D.  Referring Provider: Eric Hardy M.D.  Medical Oncologist: Eric Hardy M.D       Chief Complaint:   Chief Complaint   Patient presents with   • Follow-Up     [facility] CT RECTAL CA/LIVER METS      Diagnosis:   1. Malignant neoplasm of rectum (HCC)  MR-ABDOMEN-WITH & W/O    BUN+CREAT   2. Secondary malignant neoplasm of liver (HCC)  MR-ABDOMEN-WITH & W/O    BUN+CREAT   3. Localized edema  MR-ABDOMEN-WITH & W/O    BUN+CREAT       History of presenting illness:  Karlene Walters  is a pleasant 80 y.o. year old female who presented with follow-up status post lesions that were ablated.  Patient was not able to be resected due to significant adhesions and involvement of the liver with the diaphragm and getting access.  It appears as though it was fused to the diaphragm from her yttrium-90 any event, the patient underwent ablation CT scan which I personally reviewed dated October 2020 it reveals that there is a small areas of arterial perfusion along the wall medially.  This occurs on both sides and is difficult to assess whether this is actually tumor or just perfusion changes secondary to the large ablation.  These areas have not grown in any size since her last imaging but they do both enhancement.  There are no other tumors in the liver.  She is presently on chemotherapy gets it every 2 weeks.  She is carrying her continuous infusion pump as we speak.  She denies any fever or chills nausea or vomiting.  Her pain did resolve from her ablation.  She is here with her  discuss neck steps.      Past Medical History:   Diagnosis Date   • Adverse effect of anesthesia     elevated BP postop   • Anesthesia    • Arrhythmia     occasional   • Blood clotting disorder (HCC) 08/2015    bilat PE    • Blood transfusion 1957   • Breath shortness     pt reports d/t chemo treatment; no O2; with moderate exertion; no c/o at this time   • Cancer (HCC)  09/2012    rectal cancer,  Liver CA-2015   • CATARACT     removed b/l   • Chemotherapy-induced neutropenia (HCC) 9/28/2016   • Chickenpox     history of   • Colon cancer (HCC)    • Dental disorder     dentures UPPERS AND LOWERS   • Elevated alkaline phosphatase level 11/15/2017   • Emphysema of lung (HCC)     COPD   • Fall    • Sami measles     history of   • Heart murmur     as a child   • Hemorrhagic disorder (HCC)     on Xarelto    • High cholesterol    • Hypercholesteremia    • Hypertension     no meds currently    • Ileostomy in place (HCC)    • Ileostomy in place (HCC)    • Indigestion    • Influenza     history of   • Lightheadedness 9/17/2015   • Mumps     history of   • Obstruction     ileostomy   • Other specified symptom associated with female genital organs     HYSTERECTOMY 1993   • Pneumonia 05/2017    tx'd with antibx   • Pulmonary embolism (HCC)    • 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)    • Renal insufficiency 3/14/2016   • Restless legs 5/6/2020   • Routine general medical examination at a health care facility 3/14/2016    3/14/16   • Seasonal allergies    • Swelling of both ankles     Lasix PRN   • Tonsillitis      Past Surgical History:   Procedure Laterality Date   • HEPATIC ABLATION  5/28/2020    Procedure: ABLATION, LESION, LIVER-TRISEGMENTECTOMY, MICROWAVE ABLATION, INTRA OPERATIVE ULTRA SOUND, SIMPLE RESECTED / REPAIR OF DIAPHRAGM;  Surgeon: Kit West M.D.;  Location: SURGERY [facility];  Service: General   • HEPATIC ABLATION LAPAROSCOPIC  11/15/2018    Procedure: HEPATIC ABLATION LAPAROSCOPIC.- SEGMENT 7/8;  Surgeon: Kit West M.D.;  Location: SURGERY [facility];  Service: General   • COLONOSCOPY WITH BIOPSY  8/23/2015    Procedure: COLONOSCOPY WITH BIOPSY;  Surgeon: Wilbur Glasgow M.D.;  Location: ENDOSCOPY [facility];  Service:    • HEPATIC ABLATION LAPAROSCOPIC  7/6/2015    Procedure: HEPATIC ABLATION LAPAROSCOPIC.;  Surgeon:  Kti West M.D.;  Location: [facility];  Service:    • CATH PLACEMENT Left 7/6/2015    Procedure: CATH PLACEMENT CEPHALIC POWERPORT;  Surgeon: Kit West M.D.;  Location: [facility];  Service:    • FISTULA IN ANO REPAIR  1/28/2015    Performed by Kolton Montgomery M.D. at [facility]   • PERINEAL PROCEDURE  1/28/2015    Performed by Kolton Montgomery M.D. at [facility]   • FISTULA IN ANO REPAIR  10/15/2014    Performed by Kolton Montgomery M.D. at [facility]   • PERINEAL PROCEDURE  10/15/2014    Performed by Kolton Montgomery M.D. at [facility]   • IRRIGATION & DEBRIDEMENT GENERAL  2/19/2014    Performed by Kolton Montgomery M.D. at [facility]   • FLAP GRAFT  2/19/2014    Performed by Kolton Montgomery M.D. at [facility]   • PERINEAL PROCEDURE  12/18/2013    Performed by Kolton Montgomery M.D. at [facility]   • MYOCUTANEOUS FLAP  12/18/2013    Performed by Kolton Montgomery M.D. at [facility]   • IRRIGATION & DEBRIDEMENT GENERAL  10/23/2013    Performed by Kolton Montgomery M.D. at [facility]   • FISTULA IN ANO REPAIR  9/4/2013    Performed by Kolton Montgomery M.D. at [facility]   • FLAP ROTATION  9/4/2013    Performed by Kolton Montgomery M.D. at [facility]   • RECOVERY  8/26/2013    Performed by [facility]Rec[facility] Surgery at [facility] DAY [facility]   • BIOPSY GENERAL  7/17/2013    Performed by Kolton Montgomery M.D. at [facility]   • PROCTOSCOPY  7/17/2013    Performed by Kolton Montgomery M.D. at [facility]   • FISTULA IN ANO REPAIR  2/27/2013    Performed by Kolton Montgomery M.D. at [facility]   • SIGMOIDOSCOPY  2/27/2013    Performed by Kolton Montgomery M.D. at SURGERY [facility]   • LAPAROSCOPY  10/8/2012    Performed by Kolton Montgomery M.D. at SURGERY [facility]   • ILEO LOOP DIVERSION  10/8/2012    Performed by Kolton Montgomery,  "M.D. at SURGERY [facility]   • COLECTOMY  9/26/2012    Performed by Kolton Montgomery M.D. at SURGERY [facility]   • COLONOSCOPY FLEX W/ENDO US  9/20/2012    Performed by Harshil Bolton M.D. at SURGERY [facility]   • OTHER  2009    left eye torn retina repair   • CATARACT EXTRACTION WITH IOL  2004    bilateral   • ABDOMINAL HYSTERECTOMY TOTAL  1983   • CYST EXCISION  1972    ovarian   • COLON RESECTION     • EYE SURGERY      cataracts   • HYSTERECTOMY LAPAROSCOPY     • PB REMV 2ND CATARACT,CORN-SCLER SECTN     • TONSILLECTOMY       Allergies   Allergen Reactions   • Oxaliplatin Anaphylaxis   • Codeine      \"gets drunk\"   • Pcn [Penicillins] Itching     itching   • Sulfa Drugs Itching     itching   • Tape Rash     PAPER TAPE OK     Outpatient Encounter Medications as of 10/6/2020   Medication Sig Dispense Refill   • potassium chloride ER (KLOR-CON) 10 MEQ tablet Take 1 Tab by mouth every day. 100 Tab 3   • rivaroxaban (XARELTO) 10 MG Tab tablet Take 1 Tab by mouth every day. 30 Tab 5   • ondansetron (ZOFRAN) 4 MG Tab tablet Take 1 Tab by mouth every four hours as needed. 30 Tab 5   • lidocaine-prilocaine (EMLA) 2.5-2.5 % Cream Apply to port 1 hour prior to access of port and cover with plastic wrap. 30 g 3   • Tiotropium Bromide-Olodaterol (STIOLTO RESPIMAT) 2.5-2.5 MCG/ACT Aero Soln Take 2 Puffs by mouth every day. 1 Inhaler 11   • albuterol 108 (90 Base) MCG/ACT Aero Soln inhalation aerosol Inhale 2 Puffs by mouth every 6 hours as needed for Shortness of Breath. 8.5 g 11   • metronidazole (METROCREAM) 0.75 % cream Apply 1 Each to affected area(s) 2 times a day.     • cyanocobalamin (VITAMIN B-12) 500 MCG Tab Take 1,000 mcg by mouth every day.     • Multiple Vitamins-Minerals (CENTRUM SILVER PO) Take 1 Tab by mouth every day.     • Cholecalciferol (VITAMIN D3) 400 UNITS CAPS Take 1 Cap by mouth every day.     • loratadine (CLARITIN) 10 MG TABS Take 10 mg by mouth every day. Indications: Hayfever     • " [DISCONTINUED] heparin lock flush 100 unit/mL injection 500 Units      • [DISCONTINUED] heparin lock flush 100 unit/mL injection 300-500 Units        No facility-administered encounter medications on file as of 10/6/2020.      Social History     Socioeconomic History   • Marital status:      Spouse name: Not on file   • Number of children: Not on file   • Years of education: Not on file   • Highest education level: Not on file   Occupational History   • Not on file   Social Needs   • Financial resource strain: Not hard at all   • Food insecurity     Worry: Never true     Inability: Never true   • Transportation needs     Medical: No     Non-medical: No   Tobacco Use   • Smoking status: Never Smoker   • Smokeless tobacco: Never Used   Substance and Sexual Activity   • Alcohol use: No   • Drug use: No   • Sexual activity: Never     Partners: Male     Birth control/protection: Post-Menopausal   Lifestyle   • Physical activity     Days per week: Not on file     Minutes per session: Not on file   • Stress: Not on file   Relationships   • Social connections     Talks on phone: Not on file     Gets together: Not on file     Attends [employer] service: Not on file     Active member of club or organization: Not on file     Attends meetings of clubs or organizations: Not on file     Relationship status: Not on file   • Intimate partner violence     Fear of current or ex partner: Not on file     Emotionally abused: Not on file     Physically abused: Not on file     Forced sexual activity: Not on file   Other Topics Concern   • Primary/coprimary nurse & associates Not Asked   • Family contact information Not Asked   • OK to release patient information to the following Not Asked   • Patient preferred routine/Privacy concerns Not Asked   • Patient likes and dislikes Not Asked   • Participating In Research Study Not Asked   • Miscellaneous Not Asked   Social History Narrative   • Not on file      Social History     Tobacco  "Use   Smoking Status Never Smoker   Smokeless Tobacco Never Used     Social History     Substance and Sexual Activity   Alcohol Use No     Social History     Substance and Sexual Activity   Drug Use No        Family History   Problem Relation Age of Onset   • Heart Disease Mother    • Heart Failure Mother    • Hypertension Mother    • Hyperlipidemia Mother    • Cancer Mother    • Cancer Maternal Aunt 60        breast ca   • Lung Disease Brother         COPD/Emphysema/Smoker   • Cancer Brother         prostate cancer   • Alcohol/Drug Brother         Alcohol   • Kidney Disease Father         renal failure       Review of Systems   Respiratory: Positive for shortness of breath.    Endo/Heme/Allergies: Positive for environmental allergies.   All other systems reviewed and are negative.       Objective:   /64 (BP Location: Right arm, Patient Position: Sitting, BP Cuff Size: Adult)   Pulse 95   Temp 36.3 °C (97.3 °F) (Temporal)   Ht 1.651 m (5' 5\")   Wt 73 kg (161 lb)   LMP  (LMP Unknown)   SpO2 95%   BMI 26.79 kg/m²     Physical Exam   Constitutional: She is oriented to person, place, and time. She appears well-developed and well-nourished.   HENT:   Head: Normocephalic and atraumatic.   Eyes: Pupils are equal, round, and reactive to light. Conjunctivae are normal.   Neck: Normal range of motion. Neck supple.   Cardiovascular: Normal rate and regular rhythm.   Pulmonary/Chest: Effort normal and breath sounds normal.   Abdominal: Soft. Bowel sounds are normal.   Ostomy functioning well, she is wearing her continuous infusion belt   Musculoskeletal: Normal range of motion.   Neurological: She is alert and oriented to person, place, and time.   Skin: Skin is warm and dry.   Psychiatric: She has a normal mood and affect. Her behavior is normal. Judgment and thought content normal.   Nursing note and vitals reviewed.      Labs:  Results for KANG HALL (MRN [id number]) as of 10/5/2020 15:18   Ref. Range " 10/5/2020 10:09   WBC Latest Ref Range: 4.8 - 10.8 K/uL 7.9   RBC Latest Ref Range: 4.20 - 5.40 M/uL 4.53   Hemoglobin Latest Ref Range: 12.0 - 16.0 g/dL 12.8   Hematocrit Latest Ref Range: 37.0 - 47.0 % 40.9   MCV Latest Ref Range: 81.4 - 97.8 fL 90.3   MCH Latest Ref Range: 27.0 - 33.0 pg 28.3   MCHC Latest Ref Range: 33.6 - 35.0 g/dL 31.3 (L)   RDW Latest Ref Range: 35.9 - 50.0 fL 70.5 (H)   Platelet Count Latest Ref Range: 164 - 446 K/uL 248   MPV Latest Ref Range: 9.0 - 12.9 fL 9.7   Neutrophils-Polys Latest Ref Range: 44.00 - 72.00 % 63.20   Neutrophils (Absolute) Latest Ref Range: 2.00 - 7.15 K/uL 4.97   Lymphocytes Latest Ref Range: 22.00 - 41.00 % 21.40 (L)   Lymphs (Absolute) Latest Ref Range: 1.00 - 4.80 K/uL 1.69   Monocytes Latest Ref Range: 0.00 - 13.40 % 11.80   Monos (Absolute) Latest Ref Range: 0.00 - 0.85 K/uL 0.93 (H)   Eosinophils Latest Ref Range: 0.00 - 6.90 % 2.50   Eos (Absolute) Latest Ref Range: 0.00 - 0.51 K/uL 0.20   Basophils Latest Ref Range: 0.00 - 1.80 % 0.60   Baso (Absolute) Latest Ref Range: 0.00 - 0.12 K/uL 0.05   Immature Granulocytes Latest Ref Range: 0.00 - 0.90 % 0.50   Immature Granulocytes (abs) Latest Ref Range: 0.00 - 0.11 K/uL 0.04   Nucleated RBC Latest Units: /100 WBC 0.00   NRBC (Absolute) Latest Units: K/uL 0.00   Plt Estimation Unknown Normal   RBC Morphology Unknown Present   Anisocytosis Unknown 1+   Macrocytosis Unknown 1+   Microcytosis Unknown 1+   Poikilocytosis Unknown 1+   Ovalocytes Unknown 1+   Comments-Diff Unknown see below   Peripheral Smear Review Unknown see below   Sodium Latest Ref Range: 135 - 145 mmol/L 136   Potassium Latest Ref Range: 3.6 - 5.5 mmol/L 4.1   Chloride Latest Ref Range: 96 - 112 mmol/L 101   Co2 Latest Ref Range: 20 - 33 mmol/L 21   Anion Gap Latest Ref Range: 7.0 - 16.0  14.0   Glucose Latest Ref Range: 65 - 99 mg/dL 94   Bun Latest Ref Range: 8 - 22 mg/dL 18   Creatinine Latest Ref Range: 0.50 - 1.40 mg/dL 1.03   GFR If African  American Latest Ref Range: >60 mL/min/1.73 m 2 >60   GFR If Non  Latest Ref Range: >60 mL/min/1.73 m 2 51 (A)   Calcium Latest Ref Range: 8.5 - 10.5 mg/dL 9.6   AST(SGOT) Latest Ref Range: 12 - 45 U/L 25   ALT(SGPT) Latest Ref Range: 2 - 50 U/L 22   Alkaline Phosphatase Latest Ref Range: 30 - 99 U/L 180 (H)   Total Bilirubin Latest Ref Range: 0.1 - 1.5 mg/dL 0.6   Albumin Latest Ref Range: 3.2 - 4.9 g/dL 3.7   Total Protein Latest Ref Range: 6.0 - 8.2 g/dL 7.0   Globulin Latest Ref Range: 1.9 - 3.5 g/dL 3.3   A-G Ratio Latest Units: g/dL 1.1   Carcinoembryonic Antigen Latest Ref Range: 0.0 - 3.0 ng/mL 1.4       Imaging: 10/5/20 CT  Per my read,     IMPRESSION:     The 2 ablation cavities within the right lobe of liver have decreased in size since previous examination.  Peripheral arterial enhancement about the ablation cavity 1 including 1.7 cm focal enhancement does not appear significantly changed.  1.5 x 3.1 cm focal area of enhancement adjacent to the ablation cavity 2 appears increased concerning for tumor recurrence.  Small perihepatic fluid collections.  Tiny stone or lesion within the distal common bile duct appears unchanged. The common duct is not dilated.  1.2 x 1.8 cm right anterior epipericardiophrenic lymph node is similar to previous findings.  6.8 mm opacity anterior base right lung base is unchanged.  5.6 mm right anterior epiphrenic lymph node unchanged.         Pathology: 5/28/20    FINAL DIAGNOSIS:     A. Falciform ligament:          Portion of benign fibrovascular adipose tissue.          No malignancy identified.     Procedures: 5/28/20    1. Exploratory laparotomy.  2. Open microwave thermal ablation x10 involving 2 separate lesions in the   right lobe and medial segment of the left lobe.  This was done with 6   overlapping 100 watt at 2-minute ablation giving us 6 overlapping 3.2x4.0 cm   ablation cavities.  The second ablation was essentially right at the origin of   the  right portal pedicle approximately 2 cm away from the left portal   pedicle, which most of her liver function is from that left lobe.  She went   over a single ablation of 100 watt at 2 minutes giving us a 3x3.4 cm ablation   of a 2 cm tumor.  The rest of her liver was clean.  We were confident that we   were away enough from the hilum and the left portal pedicle, but we were   unable to do the right lobe resection due to involvement of the diaphragm,   which we had to resect and repair as well as the main right portal pedicle   near the hilum, we felt that we were going to risk injuring her left lobe   pedicle.  3. Resection and simple repair of right hemidiaphragm.    Diagnosis:     1. Malignant neoplasm of rectum (HCC)  MR-ABDOMEN-WITH & W/O    BUN+CREAT   2. Secondary malignant neoplasm of liver (HCC)  MR-ABDOMEN-WITH & W/O    BUN+CREAT   3. Localized edema  MR-ABDOMEN-WITH & W/O    BUN+CREAT           Medical Decision Making:  Today's Assessment / Status / Plan:       The present findings, my recommendation is proceed with an MRI of the liver using the 3T magnet and Eovist to confirm whether there is a mass this may all be shunting secondary to the large ablation.  Once patient has the MRI that we can decide what the neck steps are..  Patient will see me in 2 weeks after the MRI.    I, Dr. West have entered, reviewed and confirmed the above diagnoses related to this patient on this date of service, 10/6/2020 12:59 PM.    She agreed and verbalized her agreement and understanding with the current plan. I answered all questions and concerns she has at this time and advised her to call at any time in the interim with questions or concerns in regards to her care.    Thank you for allowing me to participate in her care, I will continue to follow closely.       Please note that this dictation was created using voice recognition software. I have made every reasonable attempt to correct obvious errors, but I  expect that there are errors of grammar and possibly content that I did not discover before finalizing the note.     Thank you for this consultation and allowing me to participate in your patient's care. If I can be of further service please contact my office.

## 2020-10-06 ENCOUNTER — OFFICE VISIT (OUTPATIENT)
Dept: SURGICAL ONCOLOGY | Facility: MEDICAL CENTER | Age: 80
End: 2020-10-06
Payer: MEDICARE

## 2020-10-06 ENCOUNTER — OUTPATIENT INFUSION SERVICES (OUTPATIENT)
Dept: ONCOLOGY | Facility: MEDICAL CENTER | Age: 80
End: 2020-10-06
Attending: INTERNAL MEDICINE
Payer: MEDICARE

## 2020-10-06 ENCOUNTER — OFFICE VISIT (OUTPATIENT)
Dept: HEMATOLOGY ONCOLOGY | Facility: MEDICAL CENTER | Age: 80
End: 2020-10-06
Payer: MEDICARE

## 2020-10-06 VITALS
OXYGEN SATURATION: 95 % | BODY MASS INDEX: 26.82 KG/M2 | DIASTOLIC BLOOD PRESSURE: 64 MMHG | WEIGHT: 161 LBS | HEART RATE: 95 BPM | HEIGHT: 65 IN | SYSTOLIC BLOOD PRESSURE: 138 MMHG | TEMPERATURE: 97.3 F

## 2020-10-06 VITALS
HEIGHT: 65 IN | SYSTOLIC BLOOD PRESSURE: 107 MMHG | DIASTOLIC BLOOD PRESSURE: 66 MMHG | RESPIRATION RATE: 18 BRPM | OXYGEN SATURATION: 100 % | TEMPERATURE: 98 F | HEART RATE: 100 BPM | WEIGHT: 161.6 LBS | BODY MASS INDEX: 26.92 KG/M2

## 2020-10-06 VITALS
BODY MASS INDEX: 26.87 KG/M2 | RESPIRATION RATE: 16 BRPM | WEIGHT: 161.27 LBS | TEMPERATURE: 98.6 F | SYSTOLIC BLOOD PRESSURE: 142 MMHG | OXYGEN SATURATION: 99 % | DIASTOLIC BLOOD PRESSURE: 76 MMHG | HEIGHT: 65 IN | HEART RATE: 106 BPM

## 2020-10-06 DIAGNOSIS — C20 MALIGNANT NEOPLASM OF RECTUM (HCC): ICD-10-CM

## 2020-10-06 DIAGNOSIS — R60.0 LOCALIZED EDEMA: ICD-10-CM

## 2020-10-06 DIAGNOSIS — C78.7 SECONDARY MALIGNANT NEOPLASM OF LIVER (HCC): Chronic | ICD-10-CM

## 2020-10-06 DIAGNOSIS — C20 MALIGNANT NEOPLASM OF RECTUM (HCC): Chronic | ICD-10-CM

## 2020-10-06 DIAGNOSIS — C78.7 SECONDARY MALIGNANT NEOPLASM OF LIVER (HCC): ICD-10-CM

## 2020-10-06 PROCEDURE — 96374 THER/PROPH/DIAG INJ IV PUSH: CPT | Mod: XU

## 2020-10-06 PROCEDURE — 99215 OFFICE O/P EST HI 40 MIN: CPT | Performed by: SURGERY

## 2020-10-06 PROCEDURE — 700111 HCHG RX REV CODE 636 W/ 250 OVERRIDE (IP): Performed by: NURSE PRACTITIONER

## 2020-10-06 PROCEDURE — 99214 OFFICE O/P EST MOD 30 MIN: CPT | Performed by: NURSE PRACTITIONER

## 2020-10-06 PROCEDURE — G0498 CHEMO EXTEND IV INFUS W/PUMP: HCPCS

## 2020-10-06 RX ORDER — DEXAMETHASONE SODIUM PHOSPHATE 4 MG/ML
8 INJECTION, SOLUTION INTRA-ARTICULAR; INTRALESIONAL; INTRAMUSCULAR; INTRAVENOUS; SOFT TISSUE ONCE
Status: COMPLETED | OUTPATIENT
Start: 2020-10-06 | End: 2020-10-06

## 2020-10-06 RX ADMIN — DEXAMETHASONE SODIUM PHOSPHATE 8 MG: 4 INJECTION, SOLUTION INTRA-ARTICULAR; INTRALESIONAL; INTRAMUSCULAR; INTRAVENOUS; SOFT TISSUE at 08:21

## 2020-10-06 RX ADMIN — FLUOROURACIL 3515 MG: 50 INJECTION, SOLUTION INTRAVENOUS at 09:12

## 2020-10-06 ASSESSMENT — ENCOUNTER SYMPTOMS
WEIGHT LOSS: 1
TINGLING: 1
FEVER: 0
COUGH: 1
NAUSEA: 0
CHILLS: 0
DIARRHEA: 0
VOMITING: 0
CONSTIPATION: 0
SHORTNESS OF BREATH: 1
PALPITATIONS: 0
SHORTNESS OF BREATH: 1
HEADACHES: 0
DIZZINESS: 0
ABDOMINAL PAIN: 0
MYALGIAS: 0

## 2020-10-06 ASSESSMENT — FIBROSIS 4 INDEX
FIB4 SCORE: 1.72

## 2020-10-06 NOTE — PROGRESS NOTES
Chemotherapy Verification - PRIMARY RN      Height = 164 cm  Weight = 73.3 kg  BSA = 1.83 m2       Medication: Fluorouracil  Dose: 1,920 mg/m2  Calculated Dose: 3,513.6 mg                             (In mg/m2, AUC, mg/kg)       I confirm this process was performed independently with the BSA and all final chemotherapy dosing calculations congruent.  Any discrepancies of 10% or greater have been addressed with the chemotherapy pharmacist. The resolution of the discrepancy has been documented in the EPIC progress notes.

## 2020-10-06 NOTE — PROGRESS NOTES
Pt arrived to [facility], ambulatory, for 5FU. Pt voices no complaints. Port (accessed yesterday) flushed per policy, positive blood return noted. Labs reviewed, pt within parameters to treat today. Dexamethasone administered with no s/sx of adverse reaction. 5FU CADD pump connected with no complications. Pt left IS with no s/sx of distress. Follow up appointment confirmed.

## 2020-10-06 NOTE — PROGRESS NOTES
Chemotherapy Verification - SECONDARY RN       Height = 164 cm  Weight = 73.3 kg  BSA = 1.83 m2       Medication: Adrucil  Dose: 1,920 mg/m2  Calculated Dose: 3,513.6 mg over 46 hours                             (In mg/m2, AUC, mg/kg)       I confirm that this process was performed independently.

## 2020-10-06 NOTE — PROGRESS NOTES
Subjective:      Karlene Walters is a 80 y.o. female who presents for pre-chemo of metastatic Rectal Cancer to liver.        HPI    Patient seen today in follow up for metastatic rectal carcinoma to liver.  She presents unaccompanied for today's visit.  Patient is scheduled to receive cycle #114 ([facility] #106) single agent 5-FU which she continues on every 2-week interval.    Cancer History  Patient with a long history of rectal carcinoma initially diagnosed in 2012.  She was found to have metastatic disease to liver and lung in 2015.  She is status post liver ablation as well as has received Y 90 in the past.  She was on XELOX initially but had allergic reaction to oxaliplatin (last dose 7/28/15) and was switched to single agent 5-FU. Patient initially on 5-FU at 2400 mg/m2 with Leucovorin and 5-FU push starting on 10/27/15, followed by 20% dose reduction to 1920 mg/m2 for cycle 18 (7/5/16), followed by deletion of Leucovorin and 5-FU push on 10/31/17. In January 2020 patient was changed to every 3-week cycle at cycle #99 ([facility] #91) and then changed to every 4-week interval in May 2020.  It was at that time there was noted to have growth of disease within the liver and was seen by Dr. West, surgeon.  Patient did undergo surgery in hopes of resection of the tumor but it was felt to be too close to the hilum and therefore she did undergo ablation instead on 5/28/20.  She has been back on single agent 5-FU at every 2-week interval, restarted on 6/16/20. Last CT 6/29/20 showed no new liver lesions and no new metastasis within the abdomen.       Interval History  Patient is being seen today scheduled for cycle #114 ([facility] #106).  Clinically she is doing very well.  She has mild fatigue but it is stable.  She has had minimal weight loss, approximately 2 pounds, but states she is eating well with a good appetite.  She does have a cough, more so noted with the poor air quality and shortness of breath which is  "chronic and not worsening.  She does still get the post treatment chest pain on day of her pump removal that last just approximately the day of.  She states that the chest pain is resolved with oxycodone, and sometimes she may need to take a second dose.  This is been a chronic issue for quite some time.  She denies any heart palpitations or swelling in her legs.  She does have prescription for Lasix should she have significant edema in her lower extremities given to her by cardiology but has not required it recently.  She denies any nausea, vomiting, diarrhea constipation or abdominal pain.  She stated that the abdominal pain is resolved from her recent ablation back in May 2020.  She voids without difficulty, and denies any generalized myalgias.  She has some mild peripheral neuropathy in her hands.    Patient does complain of increased redness to her toes this time, very similar to the redness she does receive on her hands from the 5-FU.    Follow-up CT scan per Dr. West completed yesterday, unable to review the results as they were not available during appointment.  She is following up with Dr. West this afternoon.    Allergies   Allergen Reactions   • Oxaliplatin Anaphylaxis   • Codeine      \"gets drunk\"   • Pcn [Penicillins] Itching     itching   • Sulfa Drugs Itching     itching   • Tape Rash     PAPER TAPE OK     Current Outpatient Medications on File Prior to Visit   Medication Sig Dispense Refill   • potassium chloride ER (KLOR-CON) 10 MEQ tablet Take 1 Tab by mouth every day. 100 Tab 3   • rivaroxaban (XARELTO) 10 MG Tab tablet Take 1 Tab by mouth every day. 30 Tab 5   • ondansetron (ZOFRAN) 4 MG Tab tablet Take 1 Tab by mouth every four hours as needed. 30 Tab 5   • lidocaine-prilocaine (EMLA) 2.5-2.5 % Cream Apply to port 1 hour prior to access of port and cover with plastic wrap. 30 g 3   • Tiotropium Bromide-Olodaterol (STIOLTO RESPIMAT) 2.5-2.5 MCG/ACT Aero Soln Take 2 Puffs by mouth " "every day. 1 Inhaler 11   • albuterol 108 (90 Base) MCG/ACT Aero Soln inhalation aerosol Inhale 2 Puffs by mouth every 6 hours as needed for Shortness of Breath. 8.5 g 11   • metronidazole (METROCREAM) 0.75 % cream Apply 1 Each to affected area(s) 2 times a day.     • cyanocobalamin (VITAMIN B-12) 500 MCG Tab Take 1,000 mcg by mouth every day.     • Multiple Vitamins-Minerals (CENTRUM SILVER PO) Take 1 Tab by mouth every day.     • Cholecalciferol (VITAMIN D3) 400 UNITS CAPS Take 1 Cap by mouth every day.     • loratadine (CLARITIN) 10 MG TABS Take 10 mg by mouth every day. Indications: Hayfever       No current facility-administered medications on file prior to visit.          Review of Systems   Constitutional: Positive for malaise/fatigue (mild) and weight loss (appetite is okay - no change in diet). Negative for chills and fever.   Respiratory: Positive for cough (more so with the smoke in the air) and shortness of breath (chronic and not worsening).    Cardiovascular: Positive for chest pain (post treatment after pump removal - last for the day of pump removal and takes on Oxycodone. Sometimes she may have to take a second pain med - this has been chronic.). Negative for palpitations and leg swelling.   Gastrointestinal: Negative for abdominal pain, constipation, diarrhea, nausea and vomiting.   Genitourinary: Negative for dysuria.        Slow urination at times   Musculoskeletal: Negative for myalgias.   Skin: Negative for itching and rash.        Redness to hands and feet from chemo   Neurological: Positive for tingling (mild). Negative for dizziness and headaches.          Objective:     /76   Pulse (!) 106   Temp 37 °C (98.6 °F) (Temporal)   Resp 16   Ht 1.66 m (5' 5.35\")   Wt 73.1 kg (161 lb 4.3 oz)   LMP  (LMP Unknown)   SpO2 99%   BMI 26.55 kg/m²      Physical Exam  Vitals signs reviewed.   Constitutional:       General: She is not in acute distress.     Appearance: Normal appearance. She " is not diaphoretic.   HENT:      Head: Normocephalic and atraumatic.   Cardiovascular:      Rate and Rhythm: Regular rhythm. Tachycardia present.      Pulses: Normal pulses.      Heart sounds: Normal heart sounds. No murmur. No friction rub. No gallop.    Pulmonary:      Effort: Pulmonary effort is normal. No respiratory distress.      Breath sounds: Normal breath sounds. No wheezing.   Abdominal:      General: Bowel sounds are normal. There is no distension.      Palpations: Abdomen is soft.      Tenderness: There is no abdominal tenderness.   Musculoskeletal: Normal range of motion.         General: No swelling or tenderness.   Lymphadenopathy:      Cervical: No cervical adenopathy.   Skin:     General: Skin is warm and dry.      Findings: No erythema or rash.   Neurological:      Mental Status: She is alert and oriented to person, place, and time.   Psychiatric:         Mood and Affect: Mood normal.         Behavior: Behavior normal.         Results for KANG HALL (MRN [id number])    Ref. Range 10/5/2020 10:09   WBC Latest Ref Range: 4.8 - 10.8 K/uL 7.9   RBC Latest Ref Range: 4.20 - 5.40 M/uL 4.53   Hemoglobin Latest Ref Range: 12.0 - 16.0 g/dL 12.8   Hematocrit Latest Ref Range: 37.0 - 47.0 % 40.9   MCV Latest Ref Range: 81.4 - 97.8 fL 90.3   MCH Latest Ref Range: 27.0 - 33.0 pg 28.3   MCHC Latest Ref Range: 33.6 - 35.0 g/dL 31.3 (L)   RDW Latest Ref Range: 35.9 - 50.0 fL 70.5 (H)   Platelet Count Latest Ref Range: 164 - 446 K/uL 248   MPV Latest Ref Range: 9.0 - 12.9 fL 9.7   Neutrophils-Polys Latest Ref Range: 44.00 - 72.00 % 63.20   Neutrophils (Absolute) Latest Ref Range: 2.00 - 7.15 K/uL 4.97   Lymphocytes Latest Ref Range: 22.00 - 41.00 % 21.40 (L)   Lymphs (Absolute) Latest Ref Range: 1.00 - 4.80 K/uL 1.69   Monocytes Latest Ref Range: 0.00 - 13.40 % 11.80   Monos (Absolute) Latest Ref Range: 0.00 - 0.85 K/uL 0.93 (H)   Eosinophils Latest Ref Range: 0.00 - 6.90 % 2.50   Eos (Absolute) Latest Ref  Range: 0.00 - 0.51 K/uL 0.20   Basophils Latest Ref Range: 0.00 - 1.80 % 0.60   Baso (Absolute) Latest Ref Range: 0.00 - 0.12 K/uL 0.05   Immature Granulocytes Latest Ref Range: 0.00 - 0.90 % 0.50   Immature Granulocytes (abs) Latest Ref Range: 0.00 - 0.11 K/uL 0.04   Nucleated RBC Latest Units: /100 WBC 0.00   NRBC (Absolute) Latest Units: K/uL 0.00   Plt Estimation Unknown Normal   RBC Morphology Unknown Present   Anisocytosis Unknown 1+   Macrocytosis Unknown 1+   Microcytosis Unknown 1+   Poikilocytosis Unknown 1+   Ovalocytes Unknown 1+   Comments-Diff Unknown see below   Peripheral Smear Review Unknown see below   Sodium Latest Ref Range: 135 - 145 mmol/L 136   Potassium Latest Ref Range: 3.6 - 5.5 mmol/L 4.1   Chloride Latest Ref Range: 96 - 112 mmol/L 101   Co2 Latest Ref Range: 20 - 33 mmol/L 21   Anion Gap Latest Ref Range: 7.0 - 16.0  14.0   Glucose Latest Ref Range: 65 - 99 mg/dL 94   Bun Latest Ref Range: 8 - 22 mg/dL 18   Creatinine Latest Ref Range: 0.50 - 1.40 mg/dL 1.03   GFR If  Latest Ref Range: >60 mL/min/1.73 m 2 >60   GFR If Non  Latest Ref Range: >60 mL/min/1.73 m 2 51 (A)   Calcium Latest Ref Range: 8.5 - 10.5 mg/dL 9.6   AST(SGOT) Latest Ref Range: 12 - 45 U/L 25   ALT(SGPT) Latest Ref Range: 2 - 50 U/L 22   Alkaline Phosphatase Latest Ref Range: 30 - 99 U/L 180 (H)   Total Bilirubin Latest Ref Range: 0.1 - 1.5 mg/dL 0.6   Albumin Latest Ref Range: 3.2 - 4.9 g/dL 3.7   Total Protein Latest Ref Range: 6.0 - 8.2 g/dL 7.0   Globulin Latest Ref Range: 1.9 - 3.5 g/dL 3.3   A-G Ratio Latest Units: g/dL 1.1   Carcinoembryonic Antigen Latest Ref Range: 0.0 - 3.0 ng/mL 1.4          Assessment/Plan:       1. Malignant neoplasm of rectum (HCC)     2. Secondary malignant neoplasm of liver (HCC)       Plan  1.  Patient with metastatic rectal carcinoma to liver currently on single agent 5-FU at a 20% dose reduction.  She is scheduled to proceed with cycle #114 ([facility] #106)  today.  Clinically she is very stable and if labs meet parameters she is okay to proceed with treatment as planned.    Patient completed CT scan with Dr. West yesterday, report not available during appointment today.  Patient is seeing Dr. West this afternoon.    2.  Patient with a history of pulmonary embolism and has been on Xarelto for quite some time.  She is on a low dose of 10 mg p.o. daily and tolerating it well.  She is to continue on this medication.    3.  Patient to follow-up in the clinic in 2 weeks or sooner if needed with Dr. Steele.      Addendum: I was able to review the CT scan patient completed yesterday ordered by Dr. West.  The 2 ablation cavities within the right lobe of the liver have decreased in size.  It does appear that there is a focal area of enhancement measuring 1.5 x 3.1 cm in size concerning for possible tumor recurrence.  It does look like patient was seen by Dr. West who is proceeding with an MRI of the abdomen for further evaluation.  Will await recommendations from Dr. West once MRI has been completed.  Dr. Steele has been made aware of the findings as well.

## 2020-10-06 NOTE — PROGRESS NOTES
"Pharmacy Chemotherapy Verification Note:    Patient Name: Karlene Walters      Dx: Colon Ca      Protocol: 5-FU + Leucovorin       Cycle 114 ([facility] cycle 106)     Previous treatment: 9/22/20    Leucovorin 400 mg/m² IV over 2 hours on Day 1, followed by  Fluorouracil 400 mg/m²  IVP on Day 1, followed by     7/5/16: Dose reduced by 20% to 320 mg/m² starting with Cycle 18 ([facility] #11)  due to neutropenia     10/31/17: delete Leucovorin and 5-FU push d/t neutropenia per Dr. Hardy   Fluorouracil 2400 mg/m² continuous infusion over 46-48 hours     7/5/16: Infusion Dose reduced by 20% starting with Cycle 18 ([facility] #11)  due to neutropenia    20% reduction (1920 mg/m²) to be continued starting C28 per C Alsop APRN    14 day cycles for 12 cycles (adjuvant) or until disease progression or unacceptable toxicity  *Dosing Reference*   NCCN Guidelines for Colon Cancer. V.2.2015.  Jay T, et al. Eur J Cancer.1999;35(9):1343-7.     Allergies:  Oxaliplatin; Codeine; Pcn [penicillins]; Sulfa drugs; and Tape     /66   Pulse 100   Temp 36.7 °C (98 °F) (Temporal)   Resp 18   Ht 1.64 m (5' 4.57\")   Wt 73.3 kg (161 lb 9.6 oz)   LMP  (LMP Unknown)   SpO2 100%   BMI 27.25 kg/m²  Body surface area is 1.83 meters squared.    Labs 10/5/20:  Meets treatment parameters    Fluorouracil (5-FU) 1920 mg/m² x 1.83 m² =  3513.6 mg   <10% difference, okay to treat with final dose = 3515 mg CIVI              Via CADD pump for home infusion over 46 hours @ 1.5 mL/hr             "

## 2020-10-08 ENCOUNTER — OUTPATIENT INFUSION SERVICES (OUTPATIENT)
Dept: ONCOLOGY | Facility: MEDICAL CENTER | Age: 80
End: 2020-10-08
Attending: INTERNAL MEDICINE
Payer: MEDICARE

## 2020-10-08 ENCOUNTER — HOSPITAL ENCOUNTER (OUTPATIENT)
Dept: RADIOLOGY | Facility: MEDICAL CENTER | Age: 80
End: 2020-10-08
Attending: SURGERY
Payer: MEDICARE

## 2020-10-08 VITALS
WEIGHT: 160.5 LBS | BODY MASS INDEX: 27.4 KG/M2 | RESPIRATION RATE: 18 BRPM | TEMPERATURE: 97.2 F | DIASTOLIC BLOOD PRESSURE: 66 MMHG | HEIGHT: 64 IN | SYSTOLIC BLOOD PRESSURE: 121 MMHG | HEART RATE: 100 BPM | OXYGEN SATURATION: 98 %

## 2020-10-08 DIAGNOSIS — C20 MALIGNANT NEOPLASM OF RECTUM (HCC): ICD-10-CM

## 2020-10-08 DIAGNOSIS — R60.0 LOCALIZED EDEMA: ICD-10-CM

## 2020-10-08 DIAGNOSIS — C78.7 SECONDARY MALIGNANT NEOPLASM OF LIVER (HCC): ICD-10-CM

## 2020-10-08 PROCEDURE — 700111 HCHG RX REV CODE 636 W/ 250 OVERRIDE (IP): Performed by: RADIOLOGY

## 2020-10-08 PROCEDURE — 74183 MRI ABD W/O CNTR FLWD CNTR: CPT

## 2020-10-08 PROCEDURE — 96523 IRRIG DRUG DELIVERY DEVICE: CPT

## 2020-10-08 PROCEDURE — A9581 GADOXETATE DISODIUM INJ: HCPCS | Performed by: SURGERY

## 2020-10-08 PROCEDURE — 700117 HCHG RX CONTRAST REV CODE 255: Performed by: SURGERY

## 2020-10-08 RX ORDER — HEPARIN SODIUM (PORCINE) LOCK FLUSH IV SOLN 100 UNIT/ML 100 UNIT/ML
500 SOLUTION INTRAVENOUS PRN
Status: DISCONTINUED | OUTPATIENT
Start: 2020-10-08 | End: 2020-10-08 | Stop reason: HOSPADM

## 2020-10-08 RX ORDER — HEPARIN SODIUM (PORCINE) LOCK FLUSH IV SOLN 100 UNIT/ML 100 UNIT/ML
500 SOLUTION INTRAVENOUS PRN
Status: DISCONTINUED | OUTPATIENT
Start: 2020-10-08 | End: 2020-10-09 | Stop reason: HOSPADM

## 2020-10-08 RX ADMIN — GADOXETATE DISODIUM 10 ML: 181.43 INJECTION, SOLUTION INTRAVENOUS at 16:28

## 2020-10-08 RX ADMIN — HEPARIN 500 UNITS: 100 SYRINGE at 14:30

## 2020-10-08 ASSESSMENT — FIBROSIS 4 INDEX: FIB4 SCORE: 1.72

## 2020-10-09 NOTE — PROGRESS NOTES
Patient arrived ambulatory to [facility] for pump disconnect.  She denies any s/s of infection or fevers.  CADD pump with 0 ml volume.  Pump disconnect, port flushed, brisk blood return noted.  Patient has MRI scheduled next.  Port left accessed and pt went to MRI.  Port to be de-accessed in MRI.  She ambulated out of clinic in no apparent distress.

## 2020-10-15 NOTE — PROGRESS NOTES
"10/19/20    Subjective    Chief Complaint:  Rectal cancer metastatic to liver nad lung.    HPI:  80 WM female from [facility] with long and complex history of rectal cancer dating back to 2012 with lung and liver metastases diagnosed in 2015. She has had single agent 5FU for years after proving allergic to Oxaliplatin. Tumor was KRAS mutated. She has had Y90 and several liver ablations. Most recently a CT abdomen was read as suspicious for recurrence but MRI liver ordered by Dr. West suggests altered perfusion related to recent ablations rather than tumor recurrence. Her alkaline phosphatase is up a tad, other LFTs normal.  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     ROS:    Constitutional: No weight loss  Skin: No rash or jaundice  HENT: No change in eyesight or hearing  Cardiovascular:No chest pain or arrythmia  Respiratory:No cough or SOB  GI:No nausea, vomiting, diarrhea, constipation  :No dysuria or frequency  Musculoskeletal:No bone or joint pain  Neuro:No sx's of neuropathy  Psych: No complaints    PMH:      Allergies   Allergen Reactions   • Oxaliplatin Anaphylaxis   • Codeine      \"gets drunk\"   • Pcn [Penicillins] Itching     itching   • Sulfa Drugs Itching     itching   • Tape Rash     PAPER TAPE OK       Past Medical History:   Diagnosis Date   • Adverse effect of anesthesia     elevated BP postop   • Anesthesia    • Arrhythmia     occasional   • Blood clotting disorder (HCC) 08/2015    bilat PE    • Blood transfusion 1957   • Breath shortness     pt reports d/t chemo treatment; no O2; with moderate exertion; no c/o at this time   • Cancer (HCC) 09/2012    rectal cancer,  Liver CA-2015   • CATARACT     removed b/l   • Chemotherapy-induced neutropenia (HCC) 9/28/2016   • Chickenpox     history of   • Colon cancer (HCC)    • Dental disorder     dentures UPPERS AND LOWERS   • Elevated alkaline phosphatase level 11/15/2017   • Emphysema of " lung (HCC)     COPD   • Fall    • [place] measles     history of   • Heart murmur     as a child   • Hemorrhagic disorder (HCC)     on Xarelto    • High cholesterol    • Hypercholesteremia    • Hypertension     no meds currently    • Ileostomy in place (HCC)    • Ileostomy in place (HCC)    • Indigestion    • Influenza     history of   • Lightheadedness 9/17/2015   • Mumps     history of   • Obstruction     ileostomy   • Other specified symptom associated with female genital organs     HYSTERECTOMY 1993   • Pneumonia 05/2017    tx'd with antibx   • Pulmonary embolism (HCC)    • 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)    • Renal insufficiency 3/14/2016   • Restless legs 5/6/2020   • Routine general medical examination at a health care facility 3/14/2016    3/14/16   • Seasonal allergies    • Swelling of both ankles     Lasix PRN   • Tonsillitis         Past Surgical History:   Procedure Laterality Date   • HEPATIC ABLATION  5/28/2020    Procedure: ABLATION, LESION, LIVER-TRISEGMENTECTOMY, MICROWAVE ABLATION, INTRA OPERATIVE ULTRA SOUND, SIMPLE RESECTED / REPAIR OF DIAPHRAGM;  Surgeon: Kit West M.D.;  Location: SURGERY [facility];  Service: General   • HEPATIC ABLATION LAPAROSCOPIC  11/15/2018    Procedure: HEPATIC ABLATION LAPAROSCOPIC.- SEGMENT 7/8;  Surgeon: Kit West M.D.;  Location: SURGERY [facility];  Service: General   • COLONOSCOPY WITH BIOPSY  8/23/2015    Procedure: COLONOSCOPY WITH BIOPSY;  Surgeon: Wilbur Glasgow M.D.;  Location: ENDOSCOPY [facility];  Service:    • HEPATIC ABLATION LAPAROSCOPIC  7/6/2015    Procedure: HEPATIC ABLATION LAPAROSCOPIC.;  Surgeon: Kit West M.D.;  Location: SURGERY [facility];  Service:    • CATH PLACEMENT Left 7/6/2015    Procedure: CATH PLACEMENT CEPHALIC POWERPORT;  Surgeon: Kit West M.D.;  Location: SURGERY [facility];  Service:    • FISTULA IN ANO REPAIR  1/28/2015     Performed by Kolton Montgomery M.D. at SURGERY [facility]   • PERINEAL PROCEDURE  1/28/2015    Performed by Kolton Montgomery M.D. at SURGERY [facility]   • FISTULA IN ANO REPAIR  10/15/2014    Performed by Kolton Montgomery M.D. at [facility]   • PERINEAL PROCEDURE  10/15/2014    Performed by Kolton Montgomery M.D. at SURGERY [facility]   • IRRIGATION & DEBRIDEMENT GENERAL  2/19/2014    Performed by Kolton Montgomery M.D. at [facility]   • FLAP GRAFT  2/19/2014    Performed by Kolton Montgomery M.D. at [facility]   • PERINEAL PROCEDURE  12/18/2013    Performed by Kolton Montgomery M.D. at [facility]   • MYOCUTANEOUS FLAP  12/18/2013    Performed by Kolton Montgomery M.D. at [facility]   • IRRIGATION & DEBRIDEMENT GENERAL  10/23/2013    Performed by Kolton Montgomery M.D. at SURGERY [facility]   • FISTULA IN ANO REPAIR  9/4/2013    Performed by Kolton Montgomery M.D. at [facility]   • FLAP ROTATION  9/4/2013    Performed by Kolton Montgomery M.D. at [facility]   • RECOVERY  8/26/2013    Performed by [facility] at [facility] SAME DAY [facility]   • BIOPSY GENERAL  7/17/2013    Performed by Kolton Montgomery M.D. at SURGERY [facility]   • PROCTOSCOPY  7/17/2013    Performed by Kolton Montgomery M.D. at [facility]   • FISTULA IN ANO REPAIR  2/27/2013    Performed by Kolton Montgomery M.D. at [facility]   • SIGMOIDOSCOPY  2/27/2013    Performed by Kolton Montgomery M.D. at [facility]   • LAPAROSCOPY  10/8/2012    Performed by Kolton Montgomery M.D. at [facility]   • ILEO LOOP DIVERSION  10/8/2012    Performed by Kolton Montgomery M.D. at [facility]   • COLECTOMY  9/26/2012    Performed by Kolton Montgomery M.D. at SURGERY [facility]   • COLONOSCOPY FLEX W/ENDO US  9/20/2012    Performed by Harshil Bolton M.D. at SURGERY [facility]   • OTHER  2009    left eye torn retina repair   • CATARACT  EXTRACTION WITH IOL  2004    bilateral   • ABDOMINAL HYSTERECTOMY TOTAL  1983   • CYST EXCISION  1972    ovarian   • COLON RESECTION     • EYE SURGERY      cataracts   • HYSTERECTOMY LAPAROSCOPY     • PB REMV 2ND CATARACT,CORN-SCLER SECTN     • TONSILLECTOMY          Medications:    Current Outpatient Medications on File Prior to Visit   Medication Sig Dispense Refill   • potassium chloride ER (KLOR-CON) 10 MEQ tablet Take 1 Tab by mouth every day. 100 Tab 3   • rivaroxaban (XARELTO) 10 MG Tab tablet Take 1 Tab by mouth every day. 30 Tab 5   • ondansetron (ZOFRAN) 4 MG Tab tablet Take 1 Tab by mouth every four hours as needed. 30 Tab 5   • lidocaine-prilocaine (EMLA) 2.5-2.5 % Cream Apply to port 1 hour prior to access of port and cover with plastic wrap. 30 g 3   • Tiotropium Bromide-Olodaterol (STIOLTO RESPIMAT) 2.5-2.5 MCG/ACT Aero Soln Take 2 Puffs by mouth every day. 1 Inhaler 11   • albuterol 108 (90 Base) MCG/ACT Aero Soln inhalation aerosol Inhale 2 Puffs by mouth every 6 hours as needed for Shortness of Breath. 8.5 g 11   • metronidazole (METROCREAM) 0.75 % cream Apply 1 Each to affected area(s) 2 times a day.     • cyanocobalamin (VITAMIN B-12) 500 MCG Tab Take 1,000 mcg by mouth every day.     • Multiple Vitamins-Minerals (CENTRUM SILVER PO) Take 1 Tab by mouth every day.     • Cholecalciferol (VITAMIN D3) 400 UNITS CAPS Take 1 Cap by mouth every day.     • loratadine (CLARITIN) 10 MG TABS Take 10 mg by mouth every day. Indications: Hayfever       No current facility-administered medications on file prior to visit.        Social History     Tobacco Use   • Smoking status: Never Smoker   • Smokeless tobacco: Never Used   Substance Use Topics   • Alcohol use: No        Family History   Problem Relation Age of Onset   • Heart Disease Mother    • Heart Failure Mother    • Hypertension Mother    • Hyperlipidemia Mother    • Cancer Mother    • Cancer Maternal Aunt 60        breast ca   • Lung Disease Brother   "       COPD/Emphysema/Smoker   • Cancer Brother         prostate cancer   • Alcohol/Drug Brother         Alcohol   • Kidney Disease Father         renal failure        Objective    Vitals:    /70 (BP Location: Right arm, Patient Position: Sitting, BP Cuff Size: Adult)   Pulse 100   Temp 36.6 °C (97.9 °F) (Temporal)   Resp 16   Ht 1.626 m (5' 4\")   Wt 72.5 kg (159 lb 13.3 oz)   LMP  (LMP Unknown)   SpO2 97%   BMI 27.44 kg/m²     Physical Exam:    Appears well-developed and well-nourished. No distress.    Head -  Normocephalic .   Eyes - Pupils are equal, round, and reactive to light. Conjunctivae and EOM are normal. No scleral icterus.   Ears - normal hearing  Neck - Normal range of motion. Neck supple. No thyromegaly  Cardiovascular - Normal rate, regular rhythm, normal heart sounds and intact distal pulses. No  gallop, murmur or rub  Pulmonary - Normal breath sounds.  No wheeze, rales or rhonci  Breast - Not examined this visit  Abdominal -Soft. No distension, tenderness, organomegaly or mass. Colostomy on right.   Extremities-  No edema or tenderness.    Nodes - No submental, submandibular, preauricular, cervical, axillary or inguinal adenopathy.    Neurological -   Alert and oriented to person, place, and time. No focal findings  Skin - Skin is warm and dry. No rash noted. Not diaphoretic. No erythema. No pallor. No jaundice   Psychiatric -  Normal mood and affect.    Labs:    Results for KANG HALL (MRN [id number]) as of 10/15/2020 12:44   Ref. Range 10/5/2020 10:09   WBC Latest Ref Range: 4.8 - 10.8 K/uL 7.9   RBC Latest Ref Range: 4.20 - 5.40 M/uL 4.53   Hemoglobin Latest Ref Range: 12.0 - 16.0 g/dL 12.8   Hematocrit Latest Ref Range: 37.0 - 47.0 % 40.9   MCV Latest Ref Range: 81.4 - 97.8 fL 90.3   MCH Latest Ref Range: 27.0 - 33.0 pg 28.3   MCHC Latest Ref Range: 33.6 - 35.0 g/dL 31.3 (L)   RDW Latest Ref Range: 35.9 - 50.0 fL 70.5 (H)   Platelet Count Latest Ref Range: 164 - 446 K/uL 248 "   Results for KANG HALL (MRN [id number])   Ref. Range 10/5/2020 10:09   Sodium Latest Ref Range: 135 - 145 mmol/L 136   Potassium Latest Ref Range: 3.6 - 5.5 mmol/L 4.1   Chloride Latest Ref Range: 96 - 112 mmol/L 101   Co2 Latest Ref Range: 20 - 33 mmol/L 21   Anion Gap Latest Ref Range: 7.0 - 16.0  14.0   Glucose Latest Ref Range: 65 - 99 mg/dL 94   Bun Latest Ref Range: 8 - 22 mg/dL 18   Creatinine Latest Ref Range: 0.50 - 1.40 mg/dL 1.03   GFR If  Latest Ref Range: >60 mL/min/1.73 m 2 >60   GFR If Non  Latest Ref Range: >60 mL/min/1.73 m 2 51 (A)   Calcium Latest Ref Range: 8.5 - 10.5 mg/dL 9.6   AST(SGOT) Latest Ref Range: 12 - 45 U/L 25   ALT(SGPT) Latest Ref Range: 2 - 50 U/L 22   Alkaline Phosphatase Latest Ref Range: 30 - 99 U/L 180 (H)   Total Bilirubin Latest Ref Range: 0.1 - 1.5 mg/dL 0.6   Albumin Latest Ref Range: 3.2 - 4.9 g/dL 3.7   Total Protein Latest Ref Range: 6.0 - 8.2 g/dL 7.0   Globulin Latest Ref Range: 1.9 - 3.5 g/dL 3.3   A-G Ratio Latest Units: g/dL 1.1   Results for KANG HALL (MRN [id number])    Ref. Range 8/11/2020 07:18 8/25/2020 07:20 9/8/2020 07:20 9/22/2020 07:20 10/5/2020 10:09   Carcinoembryonic Antigen Latest Ref Range: 0.0 - 3.0 ng/mL 1.4 1.3 1.3 1.2 1.4     Assessment    Imp:    Visit Diagnosis:    1. Malignant neoplasm of rectum (HCC)     2. Secondary malignant neoplasm of liver (HCC)           Plan:  Chemo tomorrow      Lucho Steele M.D.

## 2020-10-19 ENCOUNTER — OFFICE VISIT (OUTPATIENT)
Dept: HEMATOLOGY ONCOLOGY | Facility: MEDICAL CENTER | Age: 80
End: 2020-10-19
Payer: MEDICARE

## 2020-10-19 VITALS
OXYGEN SATURATION: 97 % | SYSTOLIC BLOOD PRESSURE: 130 MMHG | WEIGHT: 159.83 LBS | BODY MASS INDEX: 27.29 KG/M2 | TEMPERATURE: 97.9 F | HEART RATE: 100 BPM | DIASTOLIC BLOOD PRESSURE: 70 MMHG | HEIGHT: 64 IN | RESPIRATION RATE: 16 BRPM

## 2020-10-19 DIAGNOSIS — C78.7 SECONDARY MALIGNANT NEOPLASM OF LIVER (HCC): Chronic | ICD-10-CM

## 2020-10-19 DIAGNOSIS — C20 MALIGNANT NEOPLASM OF RECTUM (HCC): Chronic | ICD-10-CM

## 2020-10-19 PROCEDURE — 99214 OFFICE O/P EST MOD 30 MIN: CPT | Performed by: INTERNAL MEDICINE

## 2020-10-19 RX ORDER — PROCHLORPERAZINE MALEATE 10 MG
10 TABLET ORAL EVERY 6 HOURS PRN
Status: CANCELLED | OUTPATIENT
Start: 2020-10-20

## 2020-10-19 RX ORDER — DEXAMETHASONE SODIUM PHOSPHATE 4 MG/ML
8 INJECTION, SOLUTION INTRA-ARTICULAR; INTRALESIONAL; INTRAMUSCULAR; INTRAVENOUS; SOFT TISSUE ONCE
Status: CANCELLED | OUTPATIENT
Start: 2020-10-20

## 2020-10-19 RX ORDER — LIDOCAINE HYDROCHLORIDE 10 MG/ML
10 INJECTION, SOLUTION INFILTRATION; PERINEURAL ONCE
Status: CANCELLED | OUTPATIENT
Start: 2020-10-20

## 2020-10-19 RX ORDER — DEXTROSE MONOHYDRATE 50 MG/ML
INJECTION, SOLUTION INTRAVENOUS CONTINUOUS
Status: CANCELLED | OUTPATIENT
Start: 2020-10-20

## 2020-10-19 RX ORDER — HEPARIN SODIUM (PORCINE) LOCK FLUSH IV SOLN 100 UNIT/ML 100 UNIT/ML
500 SOLUTION INTRAVENOUS PRN
Status: CANCELLED | OUTPATIENT
Start: 2020-10-22

## 2020-10-19 RX ORDER — HEPARIN SODIUM (PORCINE) LOCK FLUSH IV SOLN 100 UNIT/ML 100 UNIT/ML
500 SOLUTION INTRAVENOUS PRN
Status: CANCELLED | OUTPATIENT
Start: 2020-10-20

## 2020-10-19 RX ORDER — ONDANSETRON 8 MG/1
8 TABLET, ORALLY DISINTEGRATING ORAL
Status: CANCELLED | OUTPATIENT
Start: 2020-10-20

## 2020-10-19 RX ORDER — ONDANSETRON 2 MG/ML
4 INJECTION INTRAMUSCULAR; INTRAVENOUS
Status: CANCELLED | OUTPATIENT
Start: 2020-10-20

## 2020-10-19 ASSESSMENT — FIBROSIS 4 INDEX: FIB4 SCORE: 1.72

## 2020-10-19 NOTE — PROGRESS NOTES
"Pharmacy Chemotherapy Verification Note:    Patient Name: Karlene Walters      Dx: Colon Ca        Protocol: 5-FU +        *Dosing Reference*                                    -- 10/31/17: delete Leucovorin and 5-FU push d/t neutropenia per Dr. Hardy   Fluorouracil 2400 mg/m² continuous infusion over 46-48 hours                              -- 20% reduction (1920 mg/m²) to be continued starting C28 per C Alsop APRN   14 day cycles for 12 cycles (adjuvant) or until disease progression or unacceptable toxicity    NCCN Guidelines for Colon Cancer. V.2.2015.  Jay GROVER, et al. Eur J Cancer.1999;35(9):1343-7.     Allergies:  Oxaliplatin; Codeine; Pcn [penicillins]; Sulfa drugs; and Tape     /73   Pulse 86   Temp 36.2 °C (97.2 °F) (Temporal)   Resp 18   Ht 1.64 m (5' 4.57\")   Wt 72.9 kg (160 lb 11.5 oz)   LMP  (LMP Unknown)   SpO2 95%   BMI 27.10 kg/m²  Body surface area is 1.82 meters squared.    All lab results 10/20/20 within treatment parameters.        Drug Order   (Drug name, dose, route, IV Fluid & volume, frequency, number of doses) Cycle 115 (Silver cycle 107)     Previous treatment: 10/6/20     Medication = Fluorouracil (5-FU)  Base Dose = 1920 mg/m²  Calc Dose: Base Dose x 1.82m² =  3494mg  Final Dose = 3495mg  Route = CIVI   Fluid & Volume = 69.9 mL (+ 3 mL overfill)  Admin Duration = Over 46 hours (to run at 1.5 mL/hour)   Via CADD pump for home infusion       <10% difference, okay to treat with final written dose     By my signature below, I confirm this process was performed independently with the BSA and all final chemotherapy dosing calculations congruent. I have reviewed the above chemotherapy order and that my calculation of the final dose and BSA (when applicable) corroborate those calculations of the  pharmacist.     Nakia Arroyo, PharmD    "

## 2020-10-19 NOTE — PROGRESS NOTES
Subjective:   10/20/2020  3:38 PM  Primary care physician:Manan Quiñonez M.D.  Referring Provider: Eric Hardy M.D.  Medical Oncologist: Eric Hardy M.D       Chief Complaint:   Chief Complaint   Patient presents with   • Follow-Up     FV 2 WEEKS MRI RECTAL CA W/LIVER METS      Diagnosis:   1. Malignant neoplasm of rectum (HCC)     2. Secondary malignant neoplasm of liver (HCC)         History of presenting illness:  Karlene Walters  is a pleasant 80 y.o. year old female who presented with follow-up MRI after having been seen for surveillance for postop ablation of 2 large liver metastasis from her colon cancer.  Unfortunately her CT scans continue to show a small area of arterial enhancement with a suspicion for recurrent disease.  My own personal review felt as though this was perfusion defect related to the ablation cavity and blood flow around the ablation so the patient was sent for an MRI of the liver.  The MRI was done on October 8, 2020 and it does appear that both lesions in the ablation cavity are related to perfusion changes and not recurrent disease it does not fit characteristics of a recurrent metastatic tumor.  Fortunately there are no other lesions.  The area of ablation continues to be necrotic.  She is a good spirits.  I have reviewed the imaging and explained it to her in person.      Past Medical History:   Diagnosis Date   • Adverse effect of anesthesia     elevated BP postop   • Anesthesia    • Arrhythmia     occasional   • Blood clotting disorder (HCC) 08/2015    bilat PE    • Blood transfusion 1957   • Breath shortness     pt reports d/t chemo treatment; no O2; with moderate exertion; no c/o at this time   • Cancer (HCC) 09/2012    rectal cancer,  Liver CA-2015   • CATARACT     removed b/l   • Chemotherapy-induced neutropenia (HCC) 9/28/2016   • Chickenpox     history of   • Colon cancer (HCC)    • Dental disorder     dentures UPPERS AND LOWERS   • Elevated alkaline phosphatase level  11/15/2017   • Emphysema of lung (HCC)     COPD   • Fall    • [place] measles     history of   • Heart murmur     as a child   • Hemorrhagic disorder (HCC)     on Xarelto    • High cholesterol    • Hypercholesteremia    • Hypertension     no meds currently    • Ileostomy in place (HCC)    • Ileostomy in place (HCC)    • Indigestion    • Influenza     history of   • Lightheadedness 9/17/2015   • Mumps     history of   • Obstruction     ileostomy   • Other specified symptom associated with female genital organs     HYSTERECTOMY 1993   • Pneumonia 05/2017    tx'd with antibx   • Pulmonary embolism (HCC)    • 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)    • Renal insufficiency 3/14/2016   • Restless legs 5/6/2020   • Routine general medical examination at a health care facility 3/14/2016    3/14/16   • Seasonal allergies    • Swelling of both ankles     Lasix PRN   • Tonsillitis      Past Surgical History:   Procedure Laterality Date   • HEPATIC ABLATION  5/28/2020    Procedure: ABLATION, LESION, LIVER-TRISEGMENTECTOMY, MICROWAVE ABLATION, INTRA OPERATIVE ULTRA SOUND, SIMPLE RESECTED / REPAIR OF DIAPHRAGM;  Surgeon: Kit West M.D.;  Location: SURGERY [facility];  Service: General   • HEPATIC ABLATION LAPAROSCOPIC  11/15/2018    Procedure: HEPATIC ABLATION LAPAROSCOPIC.- SEGMENT 7/8;  Surgeon: Kit West M.D.;  Location: SURGERY [facility];  Service: General   • COLONOSCOPY WITH BIOPSY  8/23/2015    Procedure: COLONOSCOPY WITH BIOPSY;  Surgeon: Wilbur Glasgow M.D.;  Location: ENDOSCOPY [facility];  Service:    • HEPATIC ABLATION LAPAROSCOPIC  7/6/2015    Procedure: HEPATIC ABLATION LAPAROSCOPIC.;  Surgeon: Kit West M.D.;  Location: SURGERY [facility];  Service:    • CATH PLACEMENT Left 7/6/2015    Procedure: CATH PLACEMENT CEPHALIC POWERPORT;  Surgeon: Kit West M.D.;  Location: SURGERY [facility];  Service:    • FISTULA IN ANO  REPAIR  1/28/2015    Performed by Kolton Montgomery M.D. at SURGERY [facility]   • PERINEAL PROCEDURE  1/28/2015    Performed by Kolton Montgomery M.D. at [facility]   • FISTULA IN ANO REPAIR  10/15/2014    Performed by Kolton Montgomery M.D. at [facility]   • PERINEAL PROCEDURE  10/15/2014    Performed by Kolton Montgomery M.D. at SURGERY [facility]   • IRRIGATION & DEBRIDEMENT GENERAL  2/19/2014    Performed by Kolton Montgomery M.D. at [facility]   • FLAP GRAFT  2/19/2014    Performed by Kolton Montgomery M.D. at [facility]   • PERINEAL PROCEDURE  12/18/2013    Performed by Kolton Montgomery M.D. at [facility]   • MYOCUTANEOUS FLAP  12/18/2013    Performed by Kolton Montgomery M.D. at SURGERY [facility]   • IRRIGATION & DEBRIDEMENT GENERAL  10/23/2013    Performed by Kolton Montgomery M.D. at [facility]   • FISTULA IN ANO REPAIR  9/4/2013    Performed by Kolton Montgomery M.D. at [facility]   • FLAP ROTATION  9/4/2013    Performed by Kolton Montgomery M.D. at [facility]   • RECOVERY  8/26/2013    Performed by Cath[facility]Recovery Surgery at [facility] SAME DAY [facility]   • BIOPSY GENERAL  7/17/2013    Performed by Kolton Montgomery M.D. at SURGERY [facility]   • PROCTOSCOPY  7/17/2013    Performed by Kolton Montgomery M.D. at [facility]   • FISTULA IN ANO REPAIR  2/27/2013    Performed by Kolton Montgomery M.D. at [facility]   • SIGMOIDOSCOPY  2/27/2013    Performed by Kolton Montgomery M.D. at [facility]   • LAPAROSCOPY  10/8/2012    Performed by Kolton Montgomery M.D. at [facility]   • ILEO LOOP DIVERSION  10/8/2012    Performed by Kolton Montgomery M.D. at [facility]   • COLECTOMY  9/26/2012    Performed by Kolton Montgomery M.D. at SURGERY [facility]   • COLONOSCOPY FLEX W/ENDO US  9/20/2012    Performed by Harshil Bolton M.D. at SURGERY [facility]   • OTHER  2009    left eye torn retina  "repair   • CATARACT EXTRACTION WITH IOL  2004    bilateral   • ABDOMINAL HYSTERECTOMY TOTAL  1983   • CYST EXCISION  1972    ovarian   • COLON RESECTION     • EYE SURGERY      cataracts   • HYSTERECTOMY LAPAROSCOPY     • PB REMV 2ND CATARACT,CORN-SCLER SECTN     • TONSILLECTOMY       Allergies   Allergen Reactions   • Oxaliplatin Anaphylaxis   • Codeine      \"gets drunk\"   • Pcn [Penicillins] Itching     itching   • Sulfa Drugs Itching     itching   • Tape Rash     PAPER TAPE OK     Outpatient Encounter Medications as of 10/20/2020   Medication Sig Dispense Refill   • potassium chloride ER (KLOR-CON) 10 MEQ tablet Take 1 Tab by mouth every day. 100 Tab 3   • rivaroxaban (XARELTO) 10 MG Tab tablet Take 1 Tab by mouth every day. 30 Tab 5   • ondansetron (ZOFRAN) 4 MG Tab tablet Take 1 Tab by mouth every four hours as needed. 30 Tab 5   • lidocaine-prilocaine (EMLA) 2.5-2.5 % Cream Apply to port 1 hour prior to access of port and cover with plastic wrap. 30 g 3   • Tiotropium Bromide-Olodaterol (STIOLTO RESPIMAT) 2.5-2.5 MCG/ACT Aero Soln Take 2 Puffs by mouth every day. 1 Inhaler 11   • albuterol 108 (90 Base) MCG/ACT Aero Soln inhalation aerosol Inhale 2 Puffs by mouth every 6 hours as needed for Shortness of Breath. 8.5 g 11   • metronidazole (METROCREAM) 0.75 % cream Apply 1 Each to affected area(s) 2 times a day.     • cyanocobalamin (VITAMIN B-12) 500 MCG Tab Take 1,000 mcg by mouth every day.     • Multiple Vitamins-Minerals (CENTRUM SILVER PO) Take 1 Tab by mouth every day.     • Cholecalciferol (VITAMIN D3) 400 UNITS CAPS Take 1 Cap by mouth every day.     • loratadine (CLARITIN) 10 MG TABS Take 10 mg by mouth every day. Indications: Hayfever       No facility-administered encounter medications on file as of 10/20/2020.      Social History     Socioeconomic History   • Marital status:      Spouse name: Not on file   • Number of children: Not on file   • Years of education: Not on file   • Highest " education level: Not on file   Occupational History   • Not on file   Social Needs   • Financial resource strain: Not hard at all   • Food insecurity     Worry: Never true     Inability: Never true   • Transportation needs     Medical: No     Non-medical: No   Tobacco Use   • Smoking status: Never Smoker   • Smokeless tobacco: Never Used   Substance and Sexual Activity   • Alcohol use: No   • Drug use: No   • Sexual activity: Never     Partners: Male     Birth control/protection: Post-Menopausal   Lifestyle   • Physical activity     Days per week: Not on file     Minutes per session: Not on file   • Stress: Not on file   Relationships   • Social connections     Talks on phone: Not on file     Gets together: Not on file     Attends Confucianist service: Not on file     Active member of club or organization: Not on file     Attends meetings of clubs or organizations: Not on file     Relationship status: Not on file   • Intimate partner violence     Fear of current or ex partner: Not on file     Emotionally abused: Not on file     Physically abused: Not on file     Forced sexual activity: Not on file   Other Topics Concern   • Primary/coprimary nurse & associates Not Asked   • Family contact information Not Asked   • OK to release patient information to the following Not Asked   • Patient preferred routine/Privacy concerns Not Asked   • Patient likes and dislikes Not Asked   • Participating In Research Study Not Asked   • Miscellaneous Not Asked   Social History Narrative   • Not on file      Social History     Tobacco Use   Smoking Status Never Smoker   Smokeless Tobacco Never Used     Social History     Substance and Sexual Activity   Alcohol Use No     Social History     Substance and Sexual Activity   Drug Use No        Family History   Problem Relation Age of Onset   • Heart Disease Mother    • Heart Failure Mother    • Hypertension Mother    • Hyperlipidemia Mother    • Cancer Mother    • Cancer Maternal Aunt 60      "   breast ca   • Lung Disease Brother         COPD/Emphysema/Smoker   • Cancer Brother         prostate cancer   • Alcohol/Drug Brother         Alcohol   • Kidney Disease Father         renal failure       Review of Systems   Respiratory: Positive for shortness of breath.    All other systems reviewed and are negative.       Objective:   /60 (BP Location: Right arm, Patient Position: Sitting, BP Cuff Size: Adult)   Pulse 100   Temp (!) 35.6 °C (96 °F) (Temporal)   Ht 1.676 m (5' 6\")   Wt 72.6 kg (160 lb)   LMP  (LMP Unknown)   SpO2 99%   BMI 25.82 kg/m²     Physical Exam   Constitutional: She is oriented to person, place, and time. She appears well-developed and well-nourished.   HENT:   Head: Normocephalic and atraumatic.   Eyes: Pupils are equal, round, and reactive to light. Conjunctivae are normal.   Neck: Normal range of motion. Neck supple.   Cardiovascular: Normal rate and regular rhythm.   Pulmonary/Chest: Effort normal and breath sounds normal.   Abdominal: Soft. Bowel sounds are normal.   Musculoskeletal: Normal range of motion.   Neurological: She is alert and oriented to person, place, and time.   Skin: Skin is warm.   Psychiatric: She has a normal mood and affect. Her behavior is normal. Judgment and thought content normal.   Nursing note and vitals reviewed.      Labs:  Results for KANG HALL (MRN [id number]) as of 10/19/2020 15:04   Ref. Range 10/5/2020 10:09   WBC Latest Ref Range: 4.8 - 10.8 K/uL 7.9   RBC Latest Ref Range: 4.20 - 5.40 M/uL 4.53   Hemoglobin Latest Ref Range: 12.0 - 16.0 g/dL 12.8   Hematocrit Latest Ref Range: 37.0 - 47.0 % 40.9   MCV Latest Ref Range: 81.4 - 97.8 fL 90.3   MCH Latest Ref Range: 27.0 - 33.0 pg 28.3   MCHC Latest Ref Range: 33.6 - 35.0 g/dL 31.3 (L)   RDW Latest Ref Range: 35.9 - 50.0 fL 70.5 (H)   Platelet Count Latest Ref Range: 164 - 446 K/uL 248   MPV Latest Ref Range: 9.0 - 12.9 fL 9.7   Neutrophils-Polys Latest Ref Range: 44.00 - 72.00 % " 63.20   Neutrophils (Absolute) Latest Ref Range: 2.00 - 7.15 K/uL 4.97   Lymphocytes Latest Ref Range: 22.00 - 41.00 % 21.40 (L)   Lymphs (Absolute) Latest Ref Range: 1.00 - 4.80 K/uL 1.69   Monocytes Latest Ref Range: 0.00 - 13.40 % 11.80   Monos (Absolute) Latest Ref Range: 0.00 - 0.85 K/uL 0.93 (H)   Eosinophils Latest Ref Range: 0.00 - 6.90 % 2.50   Eos (Absolute) Latest Ref Range: 0.00 - 0.51 K/uL 0.20   Basophils Latest Ref Range: 0.00 - 1.80 % 0.60   Baso (Absolute) Latest Ref Range: 0.00 - 0.12 K/uL 0.05   Immature Granulocytes Latest Ref Range: 0.00 - 0.90 % 0.50   Immature Granulocytes (abs) Latest Ref Range: 0.00 - 0.11 K/uL 0.04   Nucleated RBC Latest Units: /100 WBC 0.00   NRBC (Absolute) Latest Units: K/uL 0.00   Plt Estimation Unknown Normal   RBC Morphology Unknown Present   Anisocytosis Unknown 1+   Macrocytosis Unknown 1+   Microcytosis Unknown 1+   Poikilocytosis Unknown 1+   Ovalocytes Unknown 1+   Comments-Diff Unknown see below   Peripheral Smear Review Unknown see below   Sodium Latest Ref Range: 135 - 145 mmol/L 136   Potassium Latest Ref Range: 3.6 - 5.5 mmol/L 4.1   Chloride Latest Ref Range: 96 - 112 mmol/L 101   Co2 Latest Ref Range: 20 - 33 mmol/L 21   Anion Gap Latest Ref Range: 7.0 - 16.0  14.0   Glucose Latest Ref Range: 65 - 99 mg/dL 94   Bun Latest Ref Range: 8 - 22 mg/dL 18   Creatinine Latest Ref Range: 0.50 - 1.40 mg/dL 1.03   GFR If  Latest Ref Range: >60 mL/min/1.73 m 2 >60   GFR If Non  Latest Ref Range: >60 mL/min/1.73 m 2 51 (A)   Calcium Latest Ref Range: 8.5 - 10.5 mg/dL 9.6   AST(SGOT) Latest Ref Range: 12 - 45 U/L 25   ALT(SGPT) Latest Ref Range: 2 - 50 U/L 22   Alkaline Phosphatase Latest Ref Range: 30 - 99 U/L 180 (H)   Total Bilirubin Latest Ref Range: 0.1 - 1.5 mg/dL 0.6   Albumin Latest Ref Range: 3.2 - 4.9 g/dL 3.7   Total Protein Latest Ref Range: 6.0 - 8.2 g/dL 7.0   Globulin Latest Ref Range: 1.9 - 3.5 g/dL 3.3   A-G Ratio Latest  Units: g/dL 1.1   Carcinoembryonic Antigen Latest Ref Range: 0.0 - 3.0 ng/mL 1.4       Imaging:10/8/20 MRI     Per my read,   IMPRESSION:     1.  The superior hepatic ablation cavity is unchanged with peripheral early enhancement slightly thickened on the anterior wall which becomes isointense on delayed imaging. This is most likely due to altered perfusion following treatment.  2.  The inferior posterior hepatic ablation cavity demonstrates an irregular area of early enhancement which becomes isointense on delayed imaging which is also most likely due to altered perfusion following treatment.  3.  Neither of these has an appearance suspicious for recurrent metastatic disease.  4.  There is no other metastatic disease.  5.  There is a small filling defect in the distal common bile duct possibly scarring or an irregular stone which is nonocclusive without significant biliary dilatation. This is of doubtful clinical significance.  6.  Right anterior cardiophrenic lymph node is unchanged.       Pathology: 5/28/20     FINAL DIAGNOSIS:     A. Falciform ligament:          Portion of benign fibrovascular adipose tissue.          No malignancy identified.      Procedures: 5/28/20     1. Exploratory laparotomy.  2. Open microwave thermal ablation x10 involving 2 separate lesions in the   right lobe and medial segment of the left lobe.  This was done with 6   overlapping 100 watt at 2-minute ablation giving us 6 overlapping 3.2x4.0 cm   ablation cavities.  The second ablation was essentially right at the origin of   the right portal pedicle approximately 2 cm away from the left portal   pedicle, which most of her liver function is from that left lobe.  She went   over a single ablation of 100 watt at 2 minutes giving us a 3x3.4 cm ablation   of a 2 cm tumor.  The rest of her liver was clean.  We were confident that we   were away enough from the hilum and the left portal pedicle, but we were   unable to do the right lobe  resection due to involvement of the diaphragm,   which we had to resect and repair as well as the main right portal pedicle   near the hilum, we felt that we were going to risk injuring her left lobe   pedicle.  3. Resection and simple repair of right hemidiaphragm.       Diagnosis:     1. Malignant neoplasm of rectum (HCC)     2. Secondary malignant neoplasm of liver (HCC)             Medical Decision Making:  Today's Assessment / Status / Plan:     In light of the present findings, unfortunately the MRI shows that this is related to perfusion defect and not recurrent disease.  This is what my thoughts were but thankfully the MRI supported our thesis.  My recommendation is to repeat an MRI in 3 months again to confirm that this is consistent at that point if the patient is okay with MRI we will continue with MRIs.  I will order the MRI.  And I will see the patient 3 months after the MRI.    I, Dr. West have entered, reviewed and confirmed the above diagnoses related to this patient on this date of service, 10/20/2020  3:38 PM.    She agreed and verbalized her agreement and understanding with the current plan. I answered all questions and concerns she has at this time and advised her to call at any time in the interim with questions or concerns in regards to her care.    Thank you for allowing me to participate in her care, I will continue to follow closely.       Please note that this dictation was created using voice recognition software. I have made every reasonable attempt to correct obvious errors, but I expect that there are errors of grammar and possibly content that I did not discover before finalizing the note.     Thank you for this consultation and allowing me to participate in your patient's care. If I can be of further service please contact my office.

## 2020-10-20 ENCOUNTER — OUTPATIENT INFUSION SERVICES (OUTPATIENT)
Dept: ONCOLOGY | Facility: MEDICAL CENTER | Age: 80
End: 2020-10-20
Attending: INTERNAL MEDICINE
Payer: MEDICARE

## 2020-10-20 ENCOUNTER — OFFICE VISIT (OUTPATIENT)
Dept: SURGICAL ONCOLOGY | Facility: MEDICAL CENTER | Age: 80
End: 2020-10-20
Payer: MEDICARE

## 2020-10-20 VITALS
TEMPERATURE: 97.2 F | OXYGEN SATURATION: 95 % | RESPIRATION RATE: 18 BRPM | BODY MASS INDEX: 26.78 KG/M2 | DIASTOLIC BLOOD PRESSURE: 73 MMHG | HEART RATE: 86 BPM | WEIGHT: 160.72 LBS | HEIGHT: 65 IN | SYSTOLIC BLOOD PRESSURE: 134 MMHG

## 2020-10-20 VITALS
DIASTOLIC BLOOD PRESSURE: 60 MMHG | TEMPERATURE: 96 F | SYSTOLIC BLOOD PRESSURE: 112 MMHG | HEART RATE: 100 BPM | HEIGHT: 66 IN | BODY MASS INDEX: 25.71 KG/M2 | WEIGHT: 160 LBS | OXYGEN SATURATION: 99 %

## 2020-10-20 DIAGNOSIS — C20 MALIGNANT NEOPLASM OF RECTUM (HCC): Chronic | ICD-10-CM

## 2020-10-20 DIAGNOSIS — C20 MALIGNANT NEOPLASM OF RECTUM (HCC): ICD-10-CM

## 2020-10-20 DIAGNOSIS — C78.7 SECONDARY MALIGNANT NEOPLASM OF LIVER (HCC): ICD-10-CM

## 2020-10-20 DIAGNOSIS — K76.9 LIVER DISEASE: ICD-10-CM

## 2020-10-20 LAB
ALBUMIN SERPL BCP-MCNC: 3.7 G/DL (ref 3.2–4.9)
ALBUMIN/GLOB SERPL: 1.2 G/DL
ALP SERPL-CCNC: 187 U/L (ref 30–99)
ALT SERPL-CCNC: 21 U/L (ref 2–50)
ANION GAP SERPL CALC-SCNC: 10 MMOL/L (ref 7–16)
ANISOCYTOSIS BLD QL SMEAR: ABNORMAL
AST SERPL-CCNC: 21 U/L (ref 12–45)
BASOPHILS # BLD AUTO: 0.8 % (ref 0–1.8)
BASOPHILS # BLD: 0.06 K/UL (ref 0–0.12)
BILIRUB SERPL-MCNC: 0.4 MG/DL (ref 0.1–1.5)
BUN SERPL-MCNC: 16 MG/DL (ref 8–22)
CALCIUM SERPL-MCNC: 9.1 MG/DL (ref 8.5–10.5)
CEA SERPL-MCNC: 1.6 NG/ML (ref 0–3)
CHLORIDE SERPL-SCNC: 106 MMOL/L (ref 96–112)
CO2 SERPL-SCNC: 22 MMOL/L (ref 20–33)
COMMENT 1642: NORMAL
CREAT SERPL-MCNC: 1.04 MG/DL (ref 0.5–1.4)
EOSINOPHIL # BLD AUTO: 0.3 K/UL (ref 0–0.51)
EOSINOPHIL NFR BLD: 4.2 % (ref 0–6.9)
ERYTHROCYTE [DISTWIDTH] IN BLOOD BY AUTOMATED COUNT: 67.8 FL (ref 35.9–50)
GLOBULIN SER CALC-MCNC: 3.1 G/DL (ref 1.9–3.5)
GLUCOSE SERPL-MCNC: 111 MG/DL (ref 65–99)
HCT VFR BLD AUTO: 42 % (ref 37–47)
HGB BLD-MCNC: 13.1 G/DL (ref 12–16)
IMM GRANULOCYTES # BLD AUTO: 0.05 K/UL (ref 0–0.11)
IMM GRANULOCYTES NFR BLD AUTO: 0.7 % (ref 0–0.9)
LYMPHOCYTES # BLD AUTO: 1.39 K/UL (ref 1–4.8)
LYMPHOCYTES NFR BLD: 19.7 % (ref 22–41)
MCH RBC QN AUTO: 28.6 PG (ref 27–33)
MCHC RBC AUTO-ENTMCNC: 31.2 G/DL (ref 33.6–35)
MCV RBC AUTO: 91.7 FL (ref 81.4–97.8)
MONOCYTES # BLD AUTO: 0.74 K/UL (ref 0–0.85)
MONOCYTES NFR BLD AUTO: 10.5 % (ref 0–13.4)
MORPHOLOGY BLD-IMP: NORMAL
NEUTROPHILS # BLD AUTO: 4.53 K/UL (ref 2–7.15)
NEUTROPHILS NFR BLD: 64.1 % (ref 44–72)
NRBC # BLD AUTO: 0 K/UL
NRBC BLD-RTO: 0 /100 WBC
OVALOCYTES BLD QL SMEAR: NORMAL
PLATELET # BLD AUTO: 258 K/UL (ref 164–446)
PLATELET BLD QL SMEAR: NORMAL
PMV BLD AUTO: 9.7 FL (ref 9–12.9)
POIKILOCYTOSIS BLD QL SMEAR: NORMAL
POTASSIUM SERPL-SCNC: 3.8 MMOL/L (ref 3.6–5.5)
PROT SERPL-MCNC: 6.8 G/DL (ref 6–8.2)
RBC # BLD AUTO: 4.58 M/UL (ref 4.2–5.4)
RBC BLD AUTO: PRESENT
SODIUM SERPL-SCNC: 138 MMOL/L (ref 135–145)
WBC # BLD AUTO: 7.1 K/UL (ref 4.8–10.8)

## 2020-10-20 PROCEDURE — 82378 CARCINOEMBRYONIC ANTIGEN: CPT

## 2020-10-20 PROCEDURE — 96374 THER/PROPH/DIAG INJ IV PUSH: CPT | Mod: XU

## 2020-10-20 PROCEDURE — 99214 OFFICE O/P EST MOD 30 MIN: CPT | Performed by: SURGERY

## 2020-10-20 PROCEDURE — G0498 CHEMO EXTEND IV INFUS W/PUMP: HCPCS

## 2020-10-20 PROCEDURE — 700111 HCHG RX REV CODE 636 W/ 250 OVERRIDE (IP): Performed by: INTERNAL MEDICINE

## 2020-10-20 PROCEDURE — 85025 COMPLETE CBC W/AUTO DIFF WBC: CPT

## 2020-10-20 PROCEDURE — A4212 NON CORING NEEDLE OR STYLET: HCPCS

## 2020-10-20 PROCEDURE — 80053 COMPREHEN METABOLIC PANEL: CPT

## 2020-10-20 RX ORDER — DEXAMETHASONE SODIUM PHOSPHATE 4 MG/ML
8 INJECTION, SOLUTION INTRA-ARTICULAR; INTRALESIONAL; INTRAMUSCULAR; INTRAVENOUS; SOFT TISSUE ONCE
Status: COMPLETED | OUTPATIENT
Start: 2020-10-20 | End: 2020-10-20

## 2020-10-20 RX ADMIN — FLUOROURACIL 3495 MG: 50 INJECTION, SOLUTION INTRAVENOUS at 09:34

## 2020-10-20 RX ADMIN — DEXAMETHASONE SODIUM PHOSPHATE 8 MG: 4 INJECTION, SOLUTION INTRA-ARTICULAR; INTRALESIONAL; INTRAMUSCULAR; INTRAVENOUS; SOFT TISSUE at 08:59

## 2020-10-20 ASSESSMENT — FIBROSIS 4 INDEX
FIB4 SCORE: 1.72
FIB4 SCORE: 1.42
FIB4 SCORE: 1.42
FIB4 SCORE: 1.72

## 2020-10-20 ASSESSMENT — ENCOUNTER SYMPTOMS: SHORTNESS OF BREATH: 1

## 2020-10-20 ASSESSMENT — PAIN SCALES - GENERAL: PAINLEVEL: NO PAIN

## 2020-10-20 NOTE — PROGRESS NOTES
Chemotherapy Verification - PRIMARY RN      Height = 164 cm  Weight = 72.9 kg  BSA = 1.82 m^2       Medication: fluorouracil (ADRUCIL)  Dose: 1920 mg/m^2  Calculated Dose: 3494.4 mg over 46 hours in CADD PUMP                             (In mg/m2, AUC, mg/kg)     I confirm this process was performed independently with the BSA and all final chemotherapy dosing calculations congruent.  Any discrepancies of 10% or greater have been addressed with the chemotherapy pharmacist. The resolution of the discrepancy has been documented in the EPIC progress notes.

## 2020-10-20 NOTE — PROGRESS NOTES
"Pharmacy Chemotherapy Verification Note:    Patient Name: Karlene Walters      Dx: Colon Ca      Protocol: 5-FU + Leucovorin       Cycle 115 ([place] cycle 107)     Previous treatment: 10/6/20    Leucovorin 400 mg/m² IV over 2 hours on Day 1, followed by  Fluorouracil 400 mg/m²  IVP on Day 1, followed by     7/5/16: Dose reduced by 20% to 320 mg/m² starting with Cycle 18 ([place] #11)  due to neutropenia     10/31/17: delete Leucovorin and 5-FU push d/t neutropenia per Dr. Hardy   Fluorouracil 2400 mg/m² continuous infusion over 46-48 hours     7/5/16: Infusion Dose reduced by 20% starting with Cycle 18 ([place] #11)  due to neutropenia    20% reduction (1920 mg/m²) to be continued starting C28 per C Alsop APRN    14 day cycles for 12 cycles (adjuvant) or until disease progression or unacceptable toxicity  *Dosing Reference*   NCCN Guidelines for Colon Cancer. V.2.2015.  Jay T, et al. Eur J Cancer.1999;35(9):1343-7.     Allergies:  Oxaliplatin; Codeine; Pcn [penicillins]; Sulfa drugs; and Tape     /73   Pulse 86   Temp 36.2 °C (97.2 °F) (Temporal)   Resp 18   Ht 1.64 m (5' 4.57\")   Wt 72.9 kg (160 lb 11.5 oz)   LMP  (LMP Unknown)   SpO2 95%   BMI 27.10 kg/m²  Body surface area is 1.82 meters squared.    Labs 10/20/20:  Meets treatment parameters    Fluorouracil (5-FU) 1920 mg/m² x 1.82 m² =  3494 mg   <10% difference, okay to treat with final dose = 3495 mg CIVI              Via CADD pump for home infusion over 46 hours @ 1.5 mL/hr    Norris Mitchell, PharmD    "

## 2020-10-20 NOTE — PROGRESS NOTES
Pt presented to hospitals for pre treatment labs via port. LCW port accessed in sterile fashion. Flushed easily, paolo briskly. CBC, CMP & CEA drawn and sent to lab.Port flushed with NS. Pt escorted to infusion chair for her treatment.

## 2020-10-20 NOTE — PROGRESS NOTES
Paitient here for CADD home pump connect. Port previously  accessed; brisk blood return noted. Labs previously drawn and reviewed today. Premedication given per MAR. Patient connected to 5FU CADD pump running at 1.5 ml/hr. Total volume with overfill = 71.3 ml. Minimum to infuse = 69.9 ml. Next appointment scheduled. Discharged to self care; no apparent distress noted.

## 2020-10-20 NOTE — PROGRESS NOTES
Chemotherapy Verification - SECONDARY RN       Height = 164 cm  Weight = 72.9 kg  BSA = 1.82 m2       Medication: 5FU  Dose: 1920 mg/m2  Calculated Dose: 3494.4 mg                             (In mg/m2, AUC, mg/kg)     I confirm that this process was performed independently.

## 2020-10-22 ENCOUNTER — OUTPATIENT INFUSION SERVICES (OUTPATIENT)
Dept: ONCOLOGY | Facility: MEDICAL CENTER | Age: 80
End: 2020-10-22
Attending: INTERNAL MEDICINE
Payer: MEDICARE

## 2020-10-22 VITALS
OXYGEN SATURATION: 98 % | TEMPERATURE: 97.5 F | HEART RATE: 86 BPM | RESPIRATION RATE: 18 BRPM | SYSTOLIC BLOOD PRESSURE: 137 MMHG | DIASTOLIC BLOOD PRESSURE: 81 MMHG

## 2020-10-22 DIAGNOSIS — C20 MALIGNANT NEOPLASM OF RECTUM (HCC): ICD-10-CM

## 2020-10-22 PROCEDURE — 700111 HCHG RX REV CODE 636 W/ 250 OVERRIDE (IP): Performed by: INTERNAL MEDICINE

## 2020-10-22 PROCEDURE — 96523 IRRIG DRUG DELIVERY DEVICE: CPT | Performed by: CLINICAL NURSE SPECIALIST

## 2020-10-22 RX ORDER — HEPARIN SODIUM (PORCINE) LOCK FLUSH IV SOLN 100 UNIT/ML 100 UNIT/ML
500 SOLUTION INTRAVENOUS PRN
Status: DISCONTINUED | OUTPATIENT
Start: 2020-10-22 | End: 2020-10-22 | Stop reason: HOSPADM

## 2020-10-22 RX ADMIN — HEPARIN 500 UNITS: 100 SYRINGE at 11:46

## 2020-10-22 NOTE — PROGRESS NOTES
Karlene to infusion for port deaccess.  5FU completed with 0mL remaining in pump.  Port flushed, heparin locked and deaccessed. She is c/o nausea and dry wretching.  She is taking ondansetron.  She was discharged to home in stable condition. Aware of her next appt.

## 2020-11-02 RX ORDER — PROCHLORPERAZINE MALEATE 10 MG
10 TABLET ORAL EVERY 6 HOURS PRN
Status: CANCELLED | OUTPATIENT
Start: 2020-11-03

## 2020-11-02 RX ORDER — HEPARIN SODIUM (PORCINE) LOCK FLUSH IV SOLN 100 UNIT/ML 100 UNIT/ML
500 SOLUTION INTRAVENOUS PRN
Status: CANCELLED | OUTPATIENT
Start: 2020-11-05

## 2020-11-02 RX ORDER — ONDANSETRON 8 MG/1
8 TABLET, ORALLY DISINTEGRATING ORAL
Status: CANCELLED | OUTPATIENT
Start: 2020-11-03

## 2020-11-02 RX ORDER — DEXAMETHASONE SODIUM PHOSPHATE 4 MG/ML
8 INJECTION, SOLUTION INTRA-ARTICULAR; INTRALESIONAL; INTRAMUSCULAR; INTRAVENOUS; SOFT TISSUE ONCE
Status: CANCELLED | OUTPATIENT
Start: 2020-11-03 | End: 2020-11-03

## 2020-11-02 RX ORDER — ONDANSETRON 2 MG/ML
4 INJECTION INTRAMUSCULAR; INTRAVENOUS
Status: CANCELLED | OUTPATIENT
Start: 2020-11-03

## 2020-11-02 RX ORDER — HEPARIN SODIUM (PORCINE) LOCK FLUSH IV SOLN 100 UNIT/ML 100 UNIT/ML
500 SOLUTION INTRAVENOUS PRN
Status: CANCELLED | OUTPATIENT
Start: 2020-11-03

## 2020-11-02 RX ORDER — DEXTROSE MONOHYDRATE 50 MG/ML
INJECTION, SOLUTION INTRAVENOUS CONTINUOUS
Status: CANCELLED | OUTPATIENT
Start: 2020-11-03

## 2020-11-02 RX ORDER — LIDOCAINE HYDROCHLORIDE 10 MG/ML
10 INJECTION, SOLUTION INFILTRATION; PERINEURAL ONCE
Status: CANCELLED | OUTPATIENT
Start: 2020-11-03 | End: 2020-11-03

## 2020-11-03 ENCOUNTER — OUTPATIENT INFUSION SERVICES (OUTPATIENT)
Dept: ONCOLOGY | Facility: MEDICAL CENTER | Age: 80
End: 2020-11-03
Attending: INTERNAL MEDICINE
Payer: MEDICARE

## 2020-11-03 ENCOUNTER — OFFICE VISIT (OUTPATIENT)
Dept: HEMATOLOGY ONCOLOGY | Facility: MEDICAL CENTER | Age: 80
End: 2020-11-03
Payer: MEDICARE

## 2020-11-03 VITALS
BODY MASS INDEX: 26.89 KG/M2 | HEART RATE: 109 BPM | SYSTOLIC BLOOD PRESSURE: 155 MMHG | TEMPERATURE: 97.2 F | RESPIRATION RATE: 18 BRPM | DIASTOLIC BLOOD PRESSURE: 79 MMHG | OXYGEN SATURATION: 98 % | WEIGHT: 161.38 LBS | HEIGHT: 65 IN

## 2020-11-03 VITALS
HEIGHT: 65 IN | RESPIRATION RATE: 18 BRPM | WEIGHT: 161.6 LBS | DIASTOLIC BLOOD PRESSURE: 79 MMHG | TEMPERATURE: 97.2 F | SYSTOLIC BLOOD PRESSURE: 155 MMHG | BODY MASS INDEX: 26.92 KG/M2 | HEART RATE: 109 BPM

## 2020-11-03 VITALS
RESPIRATION RATE: 16 BRPM | TEMPERATURE: 98.2 F | SYSTOLIC BLOOD PRESSURE: 142 MMHG | HEIGHT: 65 IN | BODY MASS INDEX: 26.89 KG/M2 | WEIGHT: 161.38 LBS | DIASTOLIC BLOOD PRESSURE: 74 MMHG | HEART RATE: 110 BPM | OXYGEN SATURATION: 97 %

## 2020-11-03 DIAGNOSIS — C78.7 SECONDARY MALIGNANT NEOPLASM OF LIVER (HCC): Chronic | ICD-10-CM

## 2020-11-03 DIAGNOSIS — C20 MALIGNANT NEOPLASM OF RECTUM (HCC): ICD-10-CM

## 2020-11-03 DIAGNOSIS — C20 MALIGNANT NEOPLASM OF RECTUM (HCC): Chronic | ICD-10-CM

## 2020-11-03 LAB
ALBUMIN SERPL BCP-MCNC: 3.6 G/DL (ref 3.2–4.9)
ALBUMIN/GLOB SERPL: 1.1 G/DL
ALP SERPL-CCNC: 182 U/L (ref 30–99)
ALT SERPL-CCNC: 19 U/L (ref 2–50)
ANION GAP SERPL CALC-SCNC: 11 MMOL/L (ref 7–16)
AST SERPL-CCNC: 22 U/L (ref 12–45)
BASOPHILS # BLD AUTO: 1 % (ref 0–1.8)
BASOPHILS # BLD: 0.06 K/UL (ref 0–0.12)
BILIRUB SERPL-MCNC: 0.7 MG/DL (ref 0.1–1.5)
BUN SERPL-MCNC: 11 MG/DL (ref 8–22)
CALCIUM SERPL-MCNC: 9.5 MG/DL (ref 8.5–10.5)
CEA SERPL-MCNC: 1.6 NG/ML (ref 0–3)
CHLORIDE SERPL-SCNC: 98 MMOL/L (ref 96–112)
CO2 SERPL-SCNC: 23 MMOL/L (ref 20–33)
CREAT SERPL-MCNC: 1.08 MG/DL (ref 0.5–1.4)
EOSINOPHIL # BLD AUTO: 0.2 K/UL (ref 0–0.51)
EOSINOPHIL NFR BLD: 3.2 % (ref 0–6.9)
ERYTHROCYTE [DISTWIDTH] IN BLOOD BY AUTOMATED COUNT: 64.1 FL (ref 35.9–50)
GLOBULIN SER CALC-MCNC: 3.2 G/DL (ref 1.9–3.5)
GLUCOSE SERPL-MCNC: 131 MG/DL (ref 65–99)
HCT VFR BLD AUTO: 41.2 % (ref 37–47)
HGB BLD-MCNC: 13 G/DL (ref 12–16)
IMM GRANULOCYTES # BLD AUTO: 0.04 K/UL (ref 0–0.11)
IMM GRANULOCYTES NFR BLD AUTO: 0.6 % (ref 0–0.9)
LYMPHOCYTES # BLD AUTO: 1.15 K/UL (ref 1–4.8)
LYMPHOCYTES NFR BLD: 18.3 % (ref 22–41)
MCH RBC QN AUTO: 28.8 PG (ref 27–33)
MCHC RBC AUTO-ENTMCNC: 31.6 G/DL (ref 33.6–35)
MCV RBC AUTO: 91.4 FL (ref 81.4–97.8)
MONOCYTES # BLD AUTO: 0.65 K/UL (ref 0–0.85)
MONOCYTES NFR BLD AUTO: 10.3 % (ref 0–13.4)
NEUTROPHILS # BLD AUTO: 4.19 K/UL (ref 2–7.15)
NEUTROPHILS NFR BLD: 66.6 % (ref 44–72)
NRBC # BLD AUTO: 0 K/UL
NRBC BLD-RTO: 0 /100 WBC
PLATELET # BLD AUTO: 238 K/UL (ref 164–446)
PMV BLD AUTO: 9.3 FL (ref 9–12.9)
POTASSIUM SERPL-SCNC: 3.5 MMOL/L (ref 3.6–5.5)
PROT SERPL-MCNC: 6.8 G/DL (ref 6–8.2)
RBC # BLD AUTO: 4.51 M/UL (ref 4.2–5.4)
SODIUM SERPL-SCNC: 132 MMOL/L (ref 135–145)
WBC # BLD AUTO: 6.3 K/UL (ref 4.8–10.8)

## 2020-11-03 PROCEDURE — 85025 COMPLETE CBC W/AUTO DIFF WBC: CPT

## 2020-11-03 PROCEDURE — 99214 OFFICE O/P EST MOD 30 MIN: CPT | Performed by: NURSE PRACTITIONER

## 2020-11-03 PROCEDURE — 96374 THER/PROPH/DIAG INJ IV PUSH: CPT | Mod: XU

## 2020-11-03 PROCEDURE — 80053 COMPREHEN METABOLIC PANEL: CPT

## 2020-11-03 PROCEDURE — A4212 NON CORING NEEDLE OR STYLET: HCPCS

## 2020-11-03 PROCEDURE — 82378 CARCINOEMBRYONIC ANTIGEN: CPT

## 2020-11-03 PROCEDURE — 700111 HCHG RX REV CODE 636 W/ 250 OVERRIDE (IP)

## 2020-11-03 PROCEDURE — G0498 CHEMO EXTEND IV INFUS W/PUMP: HCPCS

## 2020-11-03 PROCEDURE — 36591 DRAW BLOOD OFF VENOUS DEVICE: CPT

## 2020-11-03 PROCEDURE — 700111 HCHG RX REV CODE 636 W/ 250 OVERRIDE (IP): Performed by: INTERNAL MEDICINE

## 2020-11-03 RX ORDER — FUROSEMIDE 40 MG/1
TABLET ORAL
COMMUNITY
Start: 2020-10-26 | End: 2021-05-19

## 2020-11-03 RX ORDER — DEXAMETHASONE SODIUM PHOSPHATE 4 MG/ML
8 INJECTION, SOLUTION INTRA-ARTICULAR; INTRALESIONAL; INTRAMUSCULAR; INTRAVENOUS; SOFT TISSUE ONCE
Status: COMPLETED | OUTPATIENT
Start: 2020-11-03 | End: 2020-11-03

## 2020-11-03 RX ORDER — HEPARIN SODIUM (PORCINE) LOCK FLUSH IV SOLN 100 UNIT/ML 100 UNIT/ML
SOLUTION INTRAVENOUS
Status: COMPLETED
Start: 2020-11-03 | End: 2020-11-03

## 2020-11-03 RX ADMIN — DEXAMETHASONE SODIUM PHOSPHATE 8 MG: 4 INJECTION, SOLUTION INTRA-ARTICULAR; INTRALESIONAL; INTRAMUSCULAR; INTRAVENOUS; SOFT TISSUE at 08:15

## 2020-11-03 RX ADMIN — FLUOROURACIL 3515 MG: 50 INJECTION, SOLUTION INTRAVENOUS at 08:56

## 2020-11-03 RX ADMIN — HEPARIN 500 UNITS: 100 SYRINGE at 07:14

## 2020-11-03 ASSESSMENT — ENCOUNTER SYMPTOMS
MYALGIAS: 0
NAUSEA: 1
FEVER: 0
DIZZINESS: 1
INSOMNIA: 0
CHILLS: 0
DIARRHEA: 0
WHEEZING: 0
PALPITATIONS: 0
SHORTNESS OF BREATH: 1
SORE THROAT: 0
VOMITING: 0
WEIGHT LOSS: 0
COUGH: 1
ABDOMINAL PAIN: 0
HEADACHES: 0
CONSTIPATION: 0

## 2020-11-03 ASSESSMENT — FIBROSIS 4 INDEX
FIB4 SCORE: 1.42
FIB4 SCORE: 1.42
FIB4 SCORE: 1.7

## 2020-11-03 NOTE — PROGRESS NOTES
Subjective:      Karlene Walters is a 80 y.o. female who presents for pre-chemo for metastatic Rectal Cancer to liver.        HPI    Patient seen today in follow up for metastatic rectal carcinoma to liver.  She presents unaccompanied for today's visit.  Patient is scheduled to receive cycle #116 ([facility] #108) single agent 5-FU which she continues on every 2-week interval.    Cancer History  Patient with a long history of rectal carcinoma initially diagnosed in 2012.  She was found to have metastatic disease to liver and lung in 2015.  She is status post liver ablation as well as has received Y 90 in the past.  She was on XELOX initially but had allergic reaction to oxaliplatin (last dose 7/28/15) and was switched to single agent 5-FU. Patient initially on 5-FU at 2400 mg/m2 with Leucovorin and 5-FU push starting on 10/27/15, followed by 20% dose reduction to 1920 mg/m2 for cycle 18 (7/5/16), followed by deletion of Leucovorin and 5-FU push on 10/31/17. In January 2020 patient was changed to every 3-week cycle at cycle #99 ([facility] #91) and then changed to every 4-week interval in May 2020.  It was at that time there was noted to have growth of disease within the liver and was seen by Dr. West, surgeon.  Patient did undergo surgery in hopes of resection of the tumor but it was felt to be too close to the hilum and therefore she did undergo ablation instead on 5/28/20.  She has been back on single agent 5-FU at every 2-week interval, restarted on 6/16/20. Last CT 10/06/20 showed the tumor ablation cavities within the right lobe of the liver have decreased in size since previous examination.  There is a 1.5 x 3.1 cm focal area of enhancement adjacent to the ablation cavity that appears to have increased concerning for possible tumor recurrence.  Based on that finding she was sent for an MRI for further evaluation on 10/8/2020 which confirmed that the appearance of the masses noted in the liver did not have  "an appearance for suspicious finding for recurrent metastatic disease.  Therefore surgeon is requesting repeat MRI in 3 months, scheduled for 1/11/2021.    Interval History  Clinically patient is doing very well.  She has normal fatigue but is very active at home.  She is eating well with no weight loss.  No significant nausea or vomiting, however she may have some nausea every once while resolved with Zofran.  She denies chest pain, heart palpitations or swelling in her legs, with the exception of chest pain during her treatment which has been chronic.  She denies any fevers or chills.  She has stable respiratory status with chronic cough and shortness of breath.  Her output via her ostomy is normal per her normal routine.  She notes some dizziness every once in a while when she gets up too fast, and realizes that she notes being off balance at times.  Overall clinically she is very stable.    Flu shot completed last week.    Allergies   Allergen Reactions   • Oxaliplatin Anaphylaxis   • Codeine      \"gets drunk\"   • Pcn [Penicillins] Itching     itching   • Sulfa Drugs Itching     itching   • Tape Rash     PAPER TAPE OK     Current Outpatient Medications on File Prior to Visit   Medication Sig Dispense Refill   • furosemide (LASIX) 40 MG Tab      • potassium chloride ER (KLOR-CON) 10 MEQ tablet Take 1 Tab by mouth every day. 100 Tab 3   • rivaroxaban (XARELTO) 10 MG Tab tablet Take 1 Tab by mouth every day. 30 Tab 5   • ondansetron (ZOFRAN) 4 MG Tab tablet Take 1 Tab by mouth every four hours as needed. 30 Tab 5   • lidocaine-prilocaine (EMLA) 2.5-2.5 % Cream Apply to port 1 hour prior to access of port and cover with plastic wrap. 30 g 3   • Tiotropium Bromide-Olodaterol (STIOLTO RESPIMAT) 2.5-2.5 MCG/ACT Aero Soln Take 2 Puffs by mouth every day. 1 Inhaler 11   • albuterol 108 (90 Base) MCG/ACT Aero Soln inhalation aerosol Inhale 2 Puffs by mouth every 6 hours as needed for Shortness of Breath. 8.5 g 11   • " "metronidazole (METROCREAM) 0.75 % cream Apply 1 Each to affected area(s) 2 times a day.     • cyanocobalamin (VITAMIN B-12) 500 MCG Tab Take 1,000 mcg by mouth every day.     • Multiple Vitamins-Minerals (CENTRUM SILVER PO) Take 1 Tab by mouth every day.     • Cholecalciferol (VITAMIN D3) 400 UNITS CAPS Take 1 Cap by mouth every day.     • loratadine (CLARITIN) 10 MG TABS Take 10 mg by mouth every day. Indications: Hayfever       Current Facility-Administered Medications on File Prior to Visit   Medication Dose Route Frequency Provider Last Rate Last Admin   • fluorouracil (ADRUCIL) 3,515 mg in CADD Cassette/Bag Chemo infusion (for use in CADD PUMP)  1,920 mg/m2 Intravenous Once Lucho Steele M.D.   3,515 mg at 11/03/20 0856         Review of Systems   Constitutional: Negative for chills, fever, malaise/fatigue (normal fatigue - she is very active at home) and weight loss.   HENT: Negative for congestion and sore throat.    Respiratory: Positive for cough and shortness of breath. Negative for wheezing.         Respiratory status is very stable   Cardiovascular: Negative for chest pain, palpitations and leg swelling.   Gastrointestinal: Positive for nausea (comes and goes - resolves with Zofran). Negative for abdominal pain, constipation, diarrhea and vomiting.   Genitourinary: Negative for dysuria.   Musculoskeletal: Negative for myalgias.   Neurological: Positive for dizziness (every once in a while when she gets up too fast - also feels like she loses her balance easily at times). Negative for headaches.   Psychiatric/Behavioral: The patient does not have insomnia.           Objective:     /74   Pulse (!) 110   Temp 36.8 °C (98.2 °F) (Temporal)   Resp 16   Ht 1.64 m (5' 4.57\")   Wt 73.2 kg (161 lb 6 oz)   LMP  (LMP Unknown)   SpO2 97%   BMI 27.22 kg/m²      Physical Exam  Vitals signs reviewed.   Constitutional:       General: She is not in acute distress.     Appearance: Normal appearance. She is " not diaphoretic.   HENT:      Head: Normocephalic and atraumatic.      Mouth/Throat:      Mouth: Mucous membranes are dry.      Pharynx: Oropharynx is clear. No oropharyngeal exudate or posterior oropharyngeal erythema.   Cardiovascular:      Rate and Rhythm: Normal rate and regular rhythm.      Pulses: Normal pulses.      Heart sounds: Normal heart sounds. No murmur. No friction rub. No gallop.    Pulmonary:      Effort: Pulmonary effort is normal. No respiratory distress.      Breath sounds: Normal breath sounds. No wheezing.   Abdominal:      General: Bowel sounds are normal. There is no distension.      Palpations: Abdomen is soft.      Tenderness: There is no abdominal tenderness.   Musculoskeletal: Normal range of motion.         General: No swelling or tenderness.   Skin:     General: Skin is warm and dry.      Coloration: Skin is not jaundiced.      Findings: No bruising.   Neurological:      Mental Status: She is alert and oriented to person, place, and time.   Psychiatric:         Mood and Affect: Mood normal.         Behavior: Behavior normal.       Results for KANG HALL (MRN [id number])   Ref. Range 11/3/2020 07:00   WBC Latest Ref Range: 4.8 - 10.8 K/uL 6.3   RBC Latest Ref Range: 4.20 - 5.40 M/uL 4.51   Hemoglobin Latest Ref Range: 12.0 - 16.0 g/dL 13.0   Hematocrit Latest Ref Range: 37.0 - 47.0 % 41.2   MCV Latest Ref Range: 81.4 - 97.8 fL 91.4   MCH Latest Ref Range: 27.0 - 33.0 pg 28.8   MCHC Latest Ref Range: 33.6 - 35.0 g/dL 31.6 (L)   RDW Latest Ref Range: 35.9 - 50.0 fL 64.1 (H)   Platelet Count Latest Ref Range: 164 - 446 K/uL 238   MPV Latest Ref Range: 9.0 - 12.9 fL 9.3   Neutrophils-Polys Latest Ref Range: 44.00 - 72.00 % 66.60   Neutrophils (Absolute) Latest Ref Range: 2.00 - 7.15 K/uL 4.19   Lymphocytes Latest Ref Range: 22.00 - 41.00 % 18.30 (L)   Lymphs (Absolute) Latest Ref Range: 1.00 - 4.80 K/uL 1.15   Monocytes Latest Ref Range: 0.00 - 13.40 % 10.30   Monos (Absolute) Latest  Ref Range: 0.00 - 0.85 K/uL 0.65   Eosinophils Latest Ref Range: 0.00 - 6.90 % 3.20   Eos (Absolute) Latest Ref Range: 0.00 - 0.51 K/uL 0.20   Basophils Latest Ref Range: 0.00 - 1.80 % 1.00   Baso (Absolute) Latest Ref Range: 0.00 - 0.12 K/uL 0.06   Immature Granulocytes Latest Ref Range: 0.00 - 0.90 % 0.60   Immature Granulocytes (abs) Latest Ref Range: 0.00 - 0.11 K/uL 0.04   Nucleated RBC Latest Units: /100 WBC 0.00   NRBC (Absolute) Latest Units: K/uL 0.00   Sodium Latest Ref Range: 135 - 145 mmol/L 132 (L)   Potassium Latest Ref Range: 3.6 - 5.5 mmol/L 3.5 (L)   Chloride Latest Ref Range: 96 - 112 mmol/L 98   Co2 Latest Ref Range: 20 - 33 mmol/L 23   Anion Gap Latest Ref Range: 7.0 - 16.0  11.0   Glucose Latest Ref Range: 65 - 99 mg/dL 131 (H)   Bun Latest Ref Range: 8 - 22 mg/dL 11   Creatinine Latest Ref Range: 0.50 - 1.40 mg/dL 1.08   GFR If  Latest Ref Range: >60 mL/min/1.73 m 2 59 (A)   GFR If Non  Latest Ref Range: >60 mL/min/1.73 m 2 49 (A)   Calcium Latest Ref Range: 8.5 - 10.5 mg/dL 9.5   AST(SGOT) Latest Ref Range: 12 - 45 U/L 22   ALT(SGPT) Latest Ref Range: 2 - 50 U/L 19   Alkaline Phosphatase Latest Ref Range: 30 - 99 U/L 182 (H)   Total Bilirubin Latest Ref Range: 0.1 - 1.5 mg/dL 0.7   Albumin Latest Ref Range: 3.2 - 4.9 g/dL 3.6   Total Protein Latest Ref Range: 6.0 - 8.2 g/dL 6.8   Globulin Latest Ref Range: 1.9 - 3.5 g/dL 3.2   A-G Ratio Latest Units: g/dL 1.1   Carcinoembryonic Antigen Latest Ref Range: 0.0 - 3.0 ng/mL 1.6        Assessment/Plan:        1. Malignant neoplasm of rectum (HCC)     2. Secondary malignant neoplasm of liver (HCC)       Plan  1. Patient with metastatic rectal carcinoma to liver currently on single agent 5-FU at a 20% dose reduction.  She is scheduled to proceed with cycle #116 ([place] #108) today.  Clinically she is very stable and if labs meet parameters she is okay to proceed with treatment as planned.    2.  Patient with history of  pulmonary embolism and has been on Xarelto for many years.  She is on a low-dose of 10 mg daily and tolerating it well.    3.  Patient will continue to follow-up in the clinic in 2 weeks or sooner if needed prior to next cycle of 5-FU.

## 2020-11-03 NOTE — PROGRESS NOTES
Pt arrived ambulatory to [facility] for pre-chemotherapy labs.  L chest port in place with EMLA cream.  Port accessed in sterile field, brisk blood return noted and labs drawn as ordered.  Port flushed per policy and left accessed for return use.  Appt confirmed.  Pt discharged from [facility] in NAD under self care.

## 2020-11-05 ENCOUNTER — OUTPATIENT INFUSION SERVICES (OUTPATIENT)
Dept: ONCOLOGY | Facility: MEDICAL CENTER | Age: 80
End: 2020-11-05
Attending: INTERNAL MEDICINE
Payer: MEDICARE

## 2020-11-05 VITALS
HEART RATE: 96 BPM | WEIGHT: 162.92 LBS | BODY MASS INDEX: 27.14 KG/M2 | HEIGHT: 65 IN | OXYGEN SATURATION: 99 % | DIASTOLIC BLOOD PRESSURE: 63 MMHG | SYSTOLIC BLOOD PRESSURE: 109 MMHG | RESPIRATION RATE: 18 BRPM | TEMPERATURE: 97.1 F

## 2020-11-05 DIAGNOSIS — C20 MALIGNANT NEOPLASM OF RECTUM (HCC): ICD-10-CM

## 2020-11-05 PROCEDURE — 96523 IRRIG DRUG DELIVERY DEVICE: CPT

## 2020-11-05 PROCEDURE — 700111 HCHG RX REV CODE 636 W/ 250 OVERRIDE (IP): Performed by: INTERNAL MEDICINE

## 2020-11-05 RX ORDER — HEPARIN SODIUM (PORCINE) LOCK FLUSH IV SOLN 100 UNIT/ML 100 UNIT/ML
500 SOLUTION INTRAVENOUS PRN
Status: DISCONTINUED | OUTPATIENT
Start: 2020-11-05 | End: 2020-11-05 | Stop reason: HOSPADM

## 2020-11-05 RX ADMIN — HEPARIN 500 UNITS: 100 SYRINGE at 11:56

## 2020-11-05 ASSESSMENT — FIBROSIS 4 INDEX: FIB4 SCORE: 1.7

## 2020-11-05 NOTE — PROGRESS NOTES
Karlene arrived ambulatory to [facility] for CADD pump disconnect and port flush. Pump reservoir volume is 0 (zero). Port with brisk blood return, flushed with NS then heparin. Port de-accessed, gauze dressing applied. Pt aware of next appt.  11/17/2020.

## 2020-11-09 ENCOUNTER — TELEPHONE (OUTPATIENT)
Dept: HEMATOLOGY ONCOLOGY | Facility: MEDICAL CENTER | Age: 80
End: 2020-11-09

## 2020-11-09 NOTE — TELEPHONE ENCOUNTER
Called and spoke to pt, she just needed some more appt made in our office, went through them with her, she agreed and stated thank you.

## 2020-11-09 NOTE — TELEPHONE ENCOUNTER
Pt has some questions in regards to their appt schedule. Would like some clarification on the schedule. Let pt know I would relay a message to MICHAEL Macias. Pt would like a call back.

## 2020-11-11 RX ORDER — HEPARIN SODIUM (PORCINE) LOCK FLUSH IV SOLN 100 UNIT/ML 100 UNIT/ML
500 SOLUTION INTRAVENOUS PRN
Status: CANCELLED | OUTPATIENT
Start: 2020-11-19

## 2020-11-11 RX ORDER — ONDANSETRON 8 MG/1
8 TABLET, ORALLY DISINTEGRATING ORAL
Status: CANCELLED | OUTPATIENT
Start: 2020-11-17

## 2020-11-11 RX ORDER — DEXTROSE MONOHYDRATE 50 MG/ML
INJECTION, SOLUTION INTRAVENOUS CONTINUOUS
Status: CANCELLED | OUTPATIENT
Start: 2020-11-17

## 2020-11-11 RX ORDER — DEXAMETHASONE SODIUM PHOSPHATE 4 MG/ML
8 INJECTION, SOLUTION INTRA-ARTICULAR; INTRALESIONAL; INTRAMUSCULAR; INTRAVENOUS; SOFT TISSUE ONCE
Status: CANCELLED | OUTPATIENT
Start: 2020-11-17 | End: 2020-11-17

## 2020-11-11 RX ORDER — ONDANSETRON 2 MG/ML
4 INJECTION INTRAMUSCULAR; INTRAVENOUS
Status: CANCELLED | OUTPATIENT
Start: 2020-11-17

## 2020-11-11 RX ORDER — HEPARIN SODIUM (PORCINE) LOCK FLUSH IV SOLN 100 UNIT/ML 100 UNIT/ML
500 SOLUTION INTRAVENOUS PRN
Status: CANCELLED | OUTPATIENT
Start: 2020-11-17

## 2020-11-11 RX ORDER — LIDOCAINE HYDROCHLORIDE 10 MG/ML
10 INJECTION, SOLUTION INFILTRATION; PERINEURAL ONCE
Status: CANCELLED | OUTPATIENT
Start: 2020-11-17 | End: 2020-11-17

## 2020-11-11 RX ORDER — PROCHLORPERAZINE MALEATE 10 MG
10 TABLET ORAL EVERY 6 HOURS PRN
Status: CANCELLED | OUTPATIENT
Start: 2020-11-17

## 2020-11-17 ENCOUNTER — OUTPATIENT INFUSION SERVICES (OUTPATIENT)
Dept: ONCOLOGY | Facility: MEDICAL CENTER | Age: 80
End: 2020-11-17
Attending: INTERNAL MEDICINE
Payer: MEDICARE

## 2020-11-17 ENCOUNTER — OFFICE VISIT (OUTPATIENT)
Dept: HEMATOLOGY ONCOLOGY | Facility: MEDICAL CENTER | Age: 80
End: 2020-11-17
Payer: MEDICARE

## 2020-11-17 VITALS
HEART RATE: 70 BPM | TEMPERATURE: 97.2 F | HEIGHT: 65 IN | SYSTOLIC BLOOD PRESSURE: 137 MMHG | WEIGHT: 160.27 LBS | OXYGEN SATURATION: 98 % | BODY MASS INDEX: 26.7 KG/M2 | RESPIRATION RATE: 18 BRPM | DIASTOLIC BLOOD PRESSURE: 88 MMHG

## 2020-11-17 VITALS
BODY MASS INDEX: 26.69 KG/M2 | WEIGHT: 160.16 LBS | RESPIRATION RATE: 16 BRPM | TEMPERATURE: 98.3 F | OXYGEN SATURATION: 97 % | HEIGHT: 65 IN | HEART RATE: 101 BPM | DIASTOLIC BLOOD PRESSURE: 74 MMHG | SYSTOLIC BLOOD PRESSURE: 132 MMHG

## 2020-11-17 VITALS
HEART RATE: 70 BPM | SYSTOLIC BLOOD PRESSURE: 137 MMHG | OXYGEN SATURATION: 98 % | RESPIRATION RATE: 18 BRPM | TEMPERATURE: 97.2 F | WEIGHT: 160.27 LBS | HEIGHT: 65 IN | DIASTOLIC BLOOD PRESSURE: 88 MMHG | BODY MASS INDEX: 26.7 KG/M2

## 2020-11-17 DIAGNOSIS — C20 MALIGNANT NEOPLASM OF RECTUM (HCC): Chronic | ICD-10-CM

## 2020-11-17 DIAGNOSIS — Z79.899 ENCOUNTER FOR LONG-TERM CURRENT USE OF HIGH RISK MEDICATION: ICD-10-CM

## 2020-11-17 DIAGNOSIS — C20 MALIGNANT NEOPLASM OF RECTUM (HCC): ICD-10-CM

## 2020-11-17 DIAGNOSIS — Z79.01 CURRENT USE OF LONG TERM ANTICOAGULATION: ICD-10-CM

## 2020-11-17 DIAGNOSIS — C78.7 SECONDARY MALIGNANT NEOPLASM OF LIVER (HCC): Chronic | ICD-10-CM

## 2020-11-17 DIAGNOSIS — Z86.711 HISTORY OF PULMONARY EMBOLUS (PE): ICD-10-CM

## 2020-11-17 LAB
ALBUMIN SERPL BCP-MCNC: 3.7 G/DL (ref 3.2–4.9)
ALBUMIN/GLOB SERPL: 1.1 G/DL
ALP SERPL-CCNC: 233 U/L (ref 30–99)
ALT SERPL-CCNC: 21 U/L (ref 2–50)
ANION GAP SERPL CALC-SCNC: 13 MMOL/L (ref 7–16)
AST SERPL-CCNC: 29 U/L (ref 12–45)
BASOPHILS # BLD AUTO: 0.6 % (ref 0–1.8)
BASOPHILS # BLD: 0.04 K/UL (ref 0–0.12)
BILIRUB SERPL-MCNC: 0.7 MG/DL (ref 0.1–1.5)
BUN SERPL-MCNC: 19 MG/DL (ref 8–22)
CALCIUM SERPL-MCNC: 9.4 MG/DL (ref 8.5–10.5)
CEA SERPL-MCNC: 1.2 NG/ML (ref 0–3)
CHLORIDE SERPL-SCNC: 103 MMOL/L (ref 96–112)
CO2 SERPL-SCNC: 21 MMOL/L (ref 20–33)
CREAT SERPL-MCNC: 1.01 MG/DL (ref 0.5–1.4)
EOSINOPHIL # BLD AUTO: 0.21 K/UL (ref 0–0.51)
EOSINOPHIL NFR BLD: 3.2 % (ref 0–6.9)
ERYTHROCYTE [DISTWIDTH] IN BLOOD BY AUTOMATED COUNT: 64.8 FL (ref 35.9–50)
GLOBULIN SER CALC-MCNC: 3.3 G/DL (ref 1.9–3.5)
GLUCOSE SERPL-MCNC: 125 MG/DL (ref 65–99)
HCT VFR BLD AUTO: 40.6 % (ref 37–47)
HGB BLD-MCNC: 12.9 G/DL (ref 12–16)
IMM GRANULOCYTES # BLD AUTO: 0.05 K/UL (ref 0–0.11)
IMM GRANULOCYTES NFR BLD AUTO: 0.8 % (ref 0–0.9)
LYMPHOCYTES # BLD AUTO: 1.22 K/UL (ref 1–4.8)
LYMPHOCYTES NFR BLD: 18.4 % (ref 22–41)
MCH RBC QN AUTO: 29.6 PG (ref 27–33)
MCHC RBC AUTO-ENTMCNC: 31.8 G/DL (ref 33.6–35)
MCV RBC AUTO: 93.1 FL (ref 81.4–97.8)
MONOCYTES # BLD AUTO: 0.81 K/UL (ref 0–0.85)
MONOCYTES NFR BLD AUTO: 12.2 % (ref 0–13.4)
NEUTROPHILS # BLD AUTO: 4.3 K/UL (ref 2–7.15)
NEUTROPHILS NFR BLD: 64.8 % (ref 44–72)
NRBC # BLD AUTO: 0 K/UL
NRBC BLD-RTO: 0 /100 WBC
PLATELET # BLD AUTO: 260 K/UL (ref 164–446)
PMV BLD AUTO: 10.1 FL (ref 9–12.9)
POTASSIUM SERPL-SCNC: 3.8 MMOL/L (ref 3.6–5.5)
PROT SERPL-MCNC: 7 G/DL (ref 6–8.2)
RBC # BLD AUTO: 4.36 M/UL (ref 4.2–5.4)
SODIUM SERPL-SCNC: 137 MMOL/L (ref 135–145)
WBC # BLD AUTO: 6.6 K/UL (ref 4.8–10.8)

## 2020-11-17 PROCEDURE — A4212 NON CORING NEEDLE OR STYLET: HCPCS

## 2020-11-17 PROCEDURE — 80053 COMPREHEN METABOLIC PANEL: CPT

## 2020-11-17 PROCEDURE — 85025 COMPLETE CBC W/AUTO DIFF WBC: CPT

## 2020-11-17 PROCEDURE — G0498 CHEMO EXTEND IV INFUS W/PUMP: HCPCS

## 2020-11-17 PROCEDURE — 99214 OFFICE O/P EST MOD 30 MIN: CPT | Performed by: NURSE PRACTITIONER

## 2020-11-17 PROCEDURE — 700111 HCHG RX REV CODE 636 W/ 250 OVERRIDE (IP): Performed by: NURSE PRACTITIONER

## 2020-11-17 PROCEDURE — 82378 CARCINOEMBRYONIC ANTIGEN: CPT

## 2020-11-17 PROCEDURE — 700111 HCHG RX REV CODE 636 W/ 250 OVERRIDE (IP)

## 2020-11-17 PROCEDURE — 96374 THER/PROPH/DIAG INJ IV PUSH: CPT | Mod: XU

## 2020-11-17 PROCEDURE — 96375 TX/PRO/DX INJ NEW DRUG ADDON: CPT

## 2020-11-17 RX ORDER — DEXAMETHASONE SODIUM PHOSPHATE 4 MG/ML
8 INJECTION, SOLUTION INTRA-ARTICULAR; INTRALESIONAL; INTRAMUSCULAR; INTRAVENOUS; SOFT TISSUE ONCE
Status: COMPLETED | OUTPATIENT
Start: 2020-11-17 | End: 2020-11-17

## 2020-11-17 RX ORDER — HEPARIN SODIUM (PORCINE) LOCK FLUSH IV SOLN 100 UNIT/ML 100 UNIT/ML
SOLUTION INTRAVENOUS
Status: COMPLETED
Start: 2020-11-17 | End: 2020-11-17

## 2020-11-17 RX ADMIN — DEXAMETHASONE SODIUM PHOSPHATE 8 MG: 4 INJECTION, SOLUTION INTRA-ARTICULAR; INTRALESIONAL; INTRAMUSCULAR; INTRAVENOUS; SOFT TISSUE at 08:25

## 2020-11-17 RX ADMIN — HEPARIN 500 UNITS: 100 SYRINGE at 07:15

## 2020-11-17 RX ADMIN — FLUOROURACIL 3515 MG: 50 INJECTION, SOLUTION INTRAVENOUS at 09:20

## 2020-11-17 ASSESSMENT — ENCOUNTER SYMPTOMS
COUGH: 1
NAUSEA: 1
FEVER: 0
ROS GI COMMENTS: NORMAL OSTOMY OUTPUT
MYALGIAS: 0
DIZZINESS: 0
DIARRHEA: 0
HEADACHES: 0
PALPITATIONS: 0
CONSTIPATION: 0
HEMOPTYSIS: 0
WHEEZING: 0
VOMITING: 0
ABDOMINAL PAIN: 0
CHILLS: 0
WEIGHT LOSS: 0
SHORTNESS OF BREATH: 0
SPUTUM PRODUCTION: 0

## 2020-11-17 ASSESSMENT — FIBROSIS 4 INDEX
FIB4 SCORE: 1.7
FIB4 SCORE: 1.7
FIB4 SCORE: 1.95

## 2020-11-17 NOTE — PROGRESS NOTES
Pt presented to [facility] for Day 1 Cycle 109 of fluorouracil CADD pump connection. L chest port previously accessed; brisk blood return observed and pt tolerated well. Labs previously drawn per orders and results were within treatable parameters. Pre meds administered and brisk blood return from port verified before initiation of the fluorouracil CADD pump. Connection confirmed and pump placed in carrying case. Next appointment confirmed for de-access and education provided. Pt discharged to self care with all personal belongings and in NAD.

## 2020-11-17 NOTE — PROGRESS NOTES
Chemotherapy Verification - PRIMARY RN  Day 1 Cycle 109      Height = 1.65 m  Weight = 72.7 kg  BSA = 1.83 m^2       Medication: fluorouracil  Dose: 1920 mg/m^2  Calculated Dose: 3513.6 mg over 46 hrs                            (In mg/m2, AUC, mg/kg)       I confirm this process was performed independently with the BSA and all final chemotherapy dosing calculations congruent.  Any discrepancies of 10% or greater have been addressed with the chemotherapy pharmacist. The resolution of the discrepancy has been documented in the EPIC progress notes.

## 2020-11-17 NOTE — PROGRESS NOTES
Subjective:      Karlene Walters is a 80 y.o. female who presents for pre-chemo for metastatic Rectal Cancer (Prechemo).        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  She presents unaccompanied for today's visit.  Patient is scheduled to receive cycle #117 ([place] #109) single agent 5-FU which she continues on every 2-week interval.    Cancer History  Patient with a long history of rectal carcinoma initially diagnosed in 2012.  She was found to have metastatic disease to liver and lung in 2015.  She is status post liver ablation as well as has received Y 90 in the past.  She was on XELOX initially but had allergic reaction to oxaliplatin (last dose 7/28/15) and was switched to single agent 5-FU. Patient initially on 5-FU at 2400 mg/m2 with Leucovorin and 5-FU push starting on 10/27/15, followed by 20% dose reduction to 1920 mg/m2 for cycle 18 (7/5/16), followed by deletion of Leucovorin and 5-FU push on 10/31/17. In January 2020 patient was changed to every 3-week cycle at cycle #99 ([place] #91) and then changed to every 4-week interval in May 2020.  It was at that time there was noted to have growth of disease within the liver and was seen by Dr. West, surgeon.  Patient did undergo surgery in hopes of resection of the tumor but it was felt to be too close to the hilum and therefore she did undergo ablation instead on 5/28/20.  She has been back on single agent 5-FU at every 2-week interval, restarted on 6/16/20. Last CT 10/06/20 showed the tumor ablation cavities within the right lobe of the liver have decreased in size since previous examination.  There is a 1.5 x 3.1 cm focal area of enhancement adjacent to the ablation cavity that appears to have increased concerning for possible tumor recurrence.  Based on that finding she was sent for an MRI for further evaluation on 10/8/2020 which confirmed that the appearance of the masses noted in the liver did not have  "an appearance for suspicious finding for recurrent metastatic disease.  Therefore surgeon is requesting repeat MRI in 3 months, scheduled for 1/11/2021.    Interval History  Patient stated she is feeling well with no significant changes clinically.  She does continue to have a cough describing it as a dry hacking cough, relieved with pressure.  She denies any sputum production, hemoptysis, shortness of breath or wheezing.  She has mild nausea at times and Zofran is effective \"most of the time.\"  She has not had any emesis.  Her output per ostomy is normal per her routine.  She does have dry hands with tracking and peeling, but she equates that to working outside with no gloves and has not moisturized in a few days.    I did review patient's most recent labs with her in detail, CBC and CMP.    Allergies   Allergen Reactions   • Oxaliplatin Anaphylaxis   • Codeine      \"gets drunk\"   • Pcn [Penicillins] Itching     itching   • Sulfa Drugs Itching     itching   • Tape Rash     PAPER TAPE OK     Current Outpatient Medications on File Prior to Visit   Medication Sig Dispense Refill   • furosemide (LASIX) 40 MG Tab      • potassium chloride ER (KLOR-CON) 10 MEQ tablet Take 1 Tab by mouth every day. 100 Tab 3   • rivaroxaban (XARELTO) 10 MG Tab tablet Take 1 Tab by mouth every day. 30 Tab 5   • ondansetron (ZOFRAN) 4 MG Tab tablet Take 1 Tab by mouth every four hours as needed. 30 Tab 5   • lidocaine-prilocaine (EMLA) 2.5-2.5 % Cream Apply to port 1 hour prior to access of port and cover with plastic wrap. 30 g 3   • Tiotropium Bromide-Olodaterol (STIOLTO RESPIMAT) 2.5-2.5 MCG/ACT Aero Soln Take 2 Puffs by mouth every day. 1 Inhaler 11   • albuterol 108 (90 Base) MCG/ACT Aero Soln inhalation aerosol Inhale 2 Puffs by mouth every 6 hours as needed for Shortness of Breath. 8.5 g 11   • metronidazole (METROCREAM) 0.75 % cream Apply 1 Each to affected area(s) 2 times a day.     • cyanocobalamin (VITAMIN B-12) 500 MCG Tab Take " "1,000 mcg by mouth every day.     • Multiple Vitamins-Minerals (CENTRUM SILVER PO) Take 1 Tab by mouth every day.     • Cholecalciferol (VITAMIN D3) 400 UNITS CAPS Take 1 Cap by mouth every day.     • loratadine (CLARITIN) 10 MG TABS Take 10 mg by mouth every day. Indications: Hayfever       Current Facility-Administered Medications on File Prior to Visit   Medication Dose Route Frequency Provider Last Rate Last Admin   • fluorouracil (ADRUCIL) 3,515 mg in CADD Cassette/Bag Chemo infusion (for use in CADD PUMP)  1,920 mg/m2 Intravenous Once Ella De A.P.N.   3,515 mg at 11/17/20 0920         Review of Systems   Constitutional: Negative for chills, fever and weight loss.   Respiratory: Positive for cough (dry hacking cough still present at times relieved with fresh air). Negative for hemoptysis, sputum production, shortness of breath and wheezing.    Cardiovascular: Negative for chest pain and palpitations.   Gastrointestinal: Positive for nausea (mild and zofran effective \"most of the time\"). Negative for abdominal pain, constipation, diarrhea and vomiting.        Normal ostomy output   Genitourinary: Negative for dysuria.   Musculoskeletal: Negative for myalgias.   Skin:        Dry hands with mild cracks in her hands - working outside with no gloves   Neurological: Negative for dizziness and headaches.          Objective:     /74   Pulse (!) 101   Temp 36.8 °C (98.3 °F) (Temporal)   Resp 16   Ht 1.66 m (5' 5.35\")   Wt 72.7 kg (160 lb 2.6 oz)   LMP  (LMP Unknown)   SpO2 97%   BMI 26.36 kg/m²      Physical Exam  Vitals signs reviewed.   Constitutional:       General: She is not in acute distress.     Appearance: Normal appearance. She is not diaphoretic.   HENT:      Head: Normocephalic and atraumatic.      Mouth/Throat:      Mouth: Mucous membranes are dry.      Pharynx: Oropharynx is clear. No oropharyngeal exudate or posterior oropharyngeal erythema.   Cardiovascular:      Rate and Rhythm: " Regular rhythm. Tachycardia present.      Pulses: Normal pulses.      Heart sounds: Normal heart sounds. No murmur. No friction rub. No gallop.       Comments: HR slightly elevated at 101  Pulmonary:      Effort: Pulmonary effort is normal. No respiratory distress.      Breath sounds: Normal breath sounds. No wheezing.   Abdominal:      General: Bowel sounds are normal. There is no distension.      Palpations: Abdomen is soft.      Tenderness: There is no abdominal tenderness.      Comments: Ostomy - midline incision dry but intact   Musculoskeletal: Normal range of motion.         General: No swelling or tenderness.   Skin:     General: Skin is warm and dry.   Neurological:      Mental Status: She is alert and oriented to person, place, and time.   Psychiatric:         Mood and Affect: Mood normal.         Behavior: Behavior normal.         Results for KANG HALL (MRN [id number])    Ref. Range 11/17/2020 07:15   WBC Latest Ref Range: 4.8 - 10.8 K/uL 6.6   RBC Latest Ref Range: 4.20 - 5.40 M/uL 4.36   Hemoglobin Latest Ref Range: 12.0 - 16.0 g/dL 12.9   Hematocrit Latest Ref Range: 37.0 - 47.0 % 40.6   MCV Latest Ref Range: 81.4 - 97.8 fL 93.1   MCH Latest Ref Range: 27.0 - 33.0 pg 29.6   MCHC Latest Ref Range: 33.6 - 35.0 g/dL 31.8 (L)   RDW Latest Ref Range: 35.9 - 50.0 fL 64.8 (H)   Platelet Count Latest Ref Range: 164 - 446 K/uL 260   MPV Latest Ref Range: 9.0 - 12.9 fL 10.1   Neutrophils-Polys Latest Ref Range: 44.00 - 72.00 % 64.80   Neutrophils (Absolute) Latest Ref Range: 2.00 - 7.15 K/uL 4.30   Lymphocytes Latest Ref Range: 22.00 - 41.00 % 18.40 (L)   Lymphs (Absolute) Latest Ref Range: 1.00 - 4.80 K/uL 1.22   Monocytes Latest Ref Range: 0.00 - 13.40 % 12.20   Monos (Absolute) Latest Ref Range: 0.00 - 0.85 K/uL 0.81   Eosinophils Latest Ref Range: 0.00 - 6.90 % 3.20   Eos (Absolute) Latest Ref Range: 0.00 - 0.51 K/uL 0.21   Basophils Latest Ref Range: 0.00 - 1.80 % 0.60   Baso (Absolute) Latest Ref  Range: 0.00 - 0.12 K/uL 0.04   Immature Granulocytes Latest Ref Range: 0.00 - 0.90 % 0.80   Immature Granulocytes (abs) Latest Ref Range: 0.00 - 0.11 K/uL 0.05   Nucleated RBC Latest Units: /100 WBC 0.00   NRBC (Absolute) Latest Units: K/uL 0.00   Sodium Latest Ref Range: 135 - 145 mmol/L 137   Potassium Latest Ref Range: 3.6 - 5.5 mmol/L 3.8   Chloride Latest Ref Range: 96 - 112 mmol/L 103   Co2 Latest Ref Range: 20 - 33 mmol/L 21   Anion Gap Latest Ref Range: 7.0 - 16.0  13.0   Glucose Latest Ref Range: 65 - 99 mg/dL 125 (H)   Bun Latest Ref Range: 8 - 22 mg/dL 19   Creatinine Latest Ref Range: 0.50 - 1.40 mg/dL 1.01   GFR If  Latest Ref Range: >60 mL/min/1.73 m 2 >60   GFR If Non  Latest Ref Range: >60 mL/min/1.73 m 2 53 (A)   Calcium Latest Ref Range: 8.5 - 10.5 mg/dL 9.4   AST(SGOT) Latest Ref Range: 12 - 45 U/L 29   ALT(SGPT) Latest Ref Range: 2 - 50 U/L 21   Alkaline Phosphatase Latest Ref Range: 30 - 99 U/L 233 (H)   Total Bilirubin Latest Ref Range: 0.1 - 1.5 mg/dL 0.7   Albumin Latest Ref Range: 3.2 - 4.9 g/dL 3.7   Total Protein Latest Ref Range: 6.0 - 8.2 g/dL 7.0   Globulin Latest Ref Range: 1.9 - 3.5 g/dL 3.3   A-G Ratio Latest Units: g/dL 1.1   Carcinoembryonic Antigen Latest Ref Range: 0.0 - 3.0 ng/mL 1.2            Assessment/Plan:        1. Malignant neoplasm of rectum (HCC)     2. Secondary malignant neoplasm of liver (HCC)     3. History of pulmonary embolus (PE)     4. Current use of long term anticoagulation     5. Encounter for long-term current use of high risk medication       Plan  1. Metastatic rectal cancer to liver - on single agent 5-FU at a 20% dose reduction.  She is scheduled to proceed with cycle #117 ([place] #109) today.  Clinically she is very stable, and I did review her CBC and CMP and CEA and labs are appropriate to proceed with treatment as planned.    2.  Patient with a history of pulmonary embolism currently on Xarelto.  She is on a low-dose  of 10 mg daily and will continue.    3.  Patient to continue to follow-up in the clinic in 2 weeks or sooner if needed prior to her next cycle of treatment.

## 2020-11-17 NOTE — PROGRESS NOTES
"Pharmacy Chemotherapy Verification Note:    Patient Name: Karlene Walters      Dx: Colon Ca        Cycle 117 ([place] cycle 109)     Previous treatment: 11/3/20    Protocol: 5-FU + Leucovorin      followed by     7/5/16: Dose reduced by 20% to 320 mg/m² starting with Cycle 18 ([place] #11)  due to neutropenia     10/31/17: delete Leucovorin and 5-FU push d/t neutropenia per Dr. Hardy   Fluorouracil continuous infusion over 46-48 hours     7/5/16: Infusion Dose reduced by 20% starting with Cycle 18 ([place] #11)  due to neutropenia    20% reduction (1920 mg/m²) to be continued starting C28 per C Alsop APRN   14 day cycles for 12 cycles (adjuvant) or until disease progression or unacceptable toxicity  NCCN Guidelines for Colon Cancer. V.2.2015.  Jay GROVER, et al. Eur J Cancer.1999;35(9):1343-7.     Allergies:  Oxaliplatin; Codeine; Pcn [penicillins]; Sulfa drugs; and Tape     /88 Comment: Transfered over from this morning.  Pulse 70 Comment: Transfered over from this morning.  Temp 36.2 °C (97.2 °F) (Temporal) Comment: Transfered over from this morning. Comment (Src): Transfered over from this morning.  Resp 18 Comment: Transfered over from this morning.  Ht 1.65 m (5' 4.96\") Comment: Transfered over from this morning.  Wt 72.7 kg (160 lb 4.4 oz) Comment: Transfered over from this morning.  LMP  (LMP Unknown)   SpO2 98% Comment: Transfered over from this morning.  BMI 26.70 kg/m²  Body surface area is 1.83 meters squared.    Labs from 11/17/20 reviewed - all within treatment parameters.      Fluorouracil (5-FU) 1920 mg/m² x 1.83 m² =  3513.6 mg   <10% difference, okay to treat with final dose = 3515 mg CIVI over 46 hrs                 Tiffanie L Pompeys Pillar, PharmD, BCOP      "

## 2020-11-17 NOTE — PROGRESS NOTES
Pt arrived to IS, ambulatory, for pre-chemo labs. Pt voices no complaints. Port accessed in sterile manner, positive blood return noted. Labs drawn per order. Port flushed and heparin locked per policy, port left accessed. Pt left IS with no s/sx of distress. Follow up appointment confirmed for later this morning.

## 2020-11-17 NOTE — PROGRESS NOTES
"Pharmacy Chemotherapy Verification Note:    Patient Name: Karlene Walters      Dx: Colon Ca        Protocol: 5-FU +        *Dosing Reference*                                    -- 10/31/17: delete Leucovorin and 5-FU push d/t neutropenia per Dr. Hardy   Fluorouracil 2400 mg/m² continuous infusion over 46-48 hours                              -- 20% reduction (1920 mg/m²) to be continued starting C28 per C Alsop APRN   14 day cycles for 12 cycles (adjuvant) or until disease progression or unacceptable toxicity    NCCN Guidelines for Colon Cancer. V.2.2015.  Jay GROVER, et al. Eur J Cancer.1999;35(9):1343-7.     Allergies:  Oxaliplatin; Codeine; Pcn [penicillins]; Sulfa drugs; and Tape     /88 Comment: Transfered over from this morning.  Pulse 70 Comment: Transfered over from this morning.  Temp 36.2 °C (97.2 °F) (Temporal) Comment: Transfered over from this morning. Comment (Src): Transfered over from this morning.  Resp 18 Comment: Transfered over from this morning.  Ht 1.65 m (5' 4.96\") Comment: Transfered over from this morning.  Wt 72.7 kg (160 lb 4.4 oz) Comment: Transfered over from this morning.  LMP  (LMP Unknown)   SpO2 98% Comment: Transfered over from this morning.  BMI 26.70 kg/m²  Body surface area is 1.83 meters squared.    All lab results 11/17/20 within treatment parameters.        Drug Order   (Drug name, dose, route, IV Fluid & volume, frequency, number of doses) Cycle 117 ([place] cycle 109)     Previous treatment: 11/3/20     Medication = Fluorouracil (5-FU)  Base Dose = 1920 mg/m²  Calc Dose: Base Dose x 1.83m² =  3513mg  Final Dose = 3515mg  Route = CIVI   Fluid & Volume = 70.3 mL (+ 3 mL overfill)  Admin Duration = Over 46 hours (to run at 1.5 mL/hour)   Via CADD pump for home infusion       <10% difference, okay to treat with final written dose     By my signature below, I confirm this process was performed independently with the BSA and all final chemotherapy dosing calculations " congruent. I have reviewed the above chemotherapy order and that my calculation of the final dose and BSA (when applicable) corroborate those calculations of the  pharmacist.     Nakia Arroyo, PharmD

## 2020-11-17 NOTE — PROGRESS NOTES
Chemotherapy Verification - SECONDARY RN       Height = 166 cm  Weight = 72.7 kg  BSA = 1.83 m2       Medication: Adrucil  Dose: 1,920 mg/m2  Calculated Dose: 3,513.6 mg over 46 hours                             (In mg/m2, AUC, mg/kg)         I confirm that this process was performed independently.

## 2020-11-19 ENCOUNTER — OUTPATIENT INFUSION SERVICES (OUTPATIENT)
Dept: ONCOLOGY | Facility: MEDICAL CENTER | Age: 80
End: 2020-11-19
Attending: INTERNAL MEDICINE
Payer: MEDICARE

## 2020-11-19 VITALS
DIASTOLIC BLOOD PRESSURE: 77 MMHG | OXYGEN SATURATION: 98 % | HEART RATE: 95 BPM | SYSTOLIC BLOOD PRESSURE: 153 MMHG | HEIGHT: 65 IN | RESPIRATION RATE: 18 BRPM | TEMPERATURE: 97.7 F | WEIGHT: 163.8 LBS | BODY MASS INDEX: 27.29 KG/M2

## 2020-11-19 DIAGNOSIS — C20 MALIGNANT NEOPLASM OF RECTUM (HCC): ICD-10-CM

## 2020-11-19 PROCEDURE — 700111 HCHG RX REV CODE 636 W/ 250 OVERRIDE (IP)

## 2020-11-19 PROCEDURE — 96523 IRRIG DRUG DELIVERY DEVICE: CPT | Performed by: CLINICAL NURSE SPECIALIST

## 2020-11-19 RX ORDER — HEPARIN SODIUM (PORCINE) LOCK FLUSH IV SOLN 100 UNIT/ML 100 UNIT/ML
SOLUTION INTRAVENOUS
Status: COMPLETED
Start: 2020-11-19 | End: 2020-11-19

## 2020-11-19 RX ORDER — HEPARIN SODIUM (PORCINE) LOCK FLUSH IV SOLN 100 UNIT/ML 100 UNIT/ML
500 SOLUTION INTRAVENOUS PRN
Status: DISCONTINUED | OUTPATIENT
Start: 2020-11-19 | End: 2020-11-19 | Stop reason: HOSPADM

## 2020-11-19 RX ADMIN — HEPARIN SODIUM (PORCINE) LOCK FLUSH IV SOLN 100 UNIT/ML 500 UNITS: 100 SOLUTION at 12:10

## 2020-11-19 RX ADMIN — HEPARIN 500 UNITS: 100 SYRINGE at 12:10

## 2020-11-19 ASSESSMENT — FIBROSIS 4 INDEX: FIB4 SCORE: 1.95

## 2020-11-19 NOTE — PROGRESS NOTES
Ms Walters is in infusion for a pump disconnect and deaccess.  She had dry heaves last night which she says is typical during this process.  No other concerns.  CADD pump off and disconnected.  Port flushed, heparin locked and deaccessed.  Next appt reviewed and Ms. Walters was discharged ambulatory to home in stable condition.

## 2020-11-25 RX ORDER — LIDOCAINE HYDROCHLORIDE 10 MG/ML
10 INJECTION, SOLUTION INFILTRATION; PERINEURAL ONCE
Status: CANCELLED | OUTPATIENT
Start: 2020-12-01 | End: 2020-12-01

## 2020-11-25 RX ORDER — ONDANSETRON 2 MG/ML
4 INJECTION INTRAMUSCULAR; INTRAVENOUS
Status: CANCELLED | OUTPATIENT
Start: 2020-12-01

## 2020-11-25 RX ORDER — DEXAMETHASONE SODIUM PHOSPHATE 4 MG/ML
8 INJECTION, SOLUTION INTRA-ARTICULAR; INTRALESIONAL; INTRAMUSCULAR; INTRAVENOUS; SOFT TISSUE ONCE
Status: CANCELLED | OUTPATIENT
Start: 2020-12-01 | End: 2020-12-01

## 2020-11-25 RX ORDER — ONDANSETRON 8 MG/1
8 TABLET, ORALLY DISINTEGRATING ORAL
Status: CANCELLED | OUTPATIENT
Start: 2020-12-01

## 2020-11-25 RX ORDER — HEPARIN SODIUM (PORCINE) LOCK FLUSH IV SOLN 100 UNIT/ML 100 UNIT/ML
500 SOLUTION INTRAVENOUS PRN
Status: CANCELLED | OUTPATIENT
Start: 2020-12-03

## 2020-11-25 RX ORDER — DEXTROSE MONOHYDRATE 50 MG/ML
INJECTION, SOLUTION INTRAVENOUS CONTINUOUS
Status: CANCELLED | OUTPATIENT
Start: 2020-12-01

## 2020-11-25 RX ORDER — PROCHLORPERAZINE MALEATE 10 MG
10 TABLET ORAL EVERY 6 HOURS PRN
Status: CANCELLED | OUTPATIENT
Start: 2020-12-01

## 2020-11-25 RX ORDER — HEPARIN SODIUM (PORCINE) LOCK FLUSH IV SOLN 100 UNIT/ML 100 UNIT/ML
500 SOLUTION INTRAVENOUS PRN
Status: CANCELLED | OUTPATIENT
Start: 2020-12-01

## 2020-12-01 ENCOUNTER — OUTPATIENT INFUSION SERVICES (OUTPATIENT)
Dept: ONCOLOGY | Facility: MEDICAL CENTER | Age: 80
End: 2020-12-01
Attending: INTERNAL MEDICINE
Payer: MEDICARE

## 2020-12-01 ENCOUNTER — OFFICE VISIT (OUTPATIENT)
Dept: HEMATOLOGY ONCOLOGY | Facility: MEDICAL CENTER | Age: 80
End: 2020-12-01
Payer: MEDICARE

## 2020-12-01 VITALS
SYSTOLIC BLOOD PRESSURE: 137 MMHG | TEMPERATURE: 97 F | WEIGHT: 160.72 LBS | RESPIRATION RATE: 18 BRPM | OXYGEN SATURATION: 99 % | BODY MASS INDEX: 26.78 KG/M2 | DIASTOLIC BLOOD PRESSURE: 62 MMHG | HEIGHT: 65 IN | HEART RATE: 98 BPM

## 2020-12-01 VITALS
SYSTOLIC BLOOD PRESSURE: 132 MMHG | RESPIRATION RATE: 14 BRPM | DIASTOLIC BLOOD PRESSURE: 72 MMHG | HEART RATE: 94 BPM | TEMPERATURE: 98.3 F | HEIGHT: 65 IN | BODY MASS INDEX: 26.74 KG/M2 | OXYGEN SATURATION: 98 % | WEIGHT: 160.5 LBS

## 2020-12-01 VITALS
OXYGEN SATURATION: 99 % | HEART RATE: 98 BPM | DIASTOLIC BLOOD PRESSURE: 62 MMHG | TEMPERATURE: 97 F | RESPIRATION RATE: 18 BRPM | SYSTOLIC BLOOD PRESSURE: 137 MMHG | BODY MASS INDEX: 26.8 KG/M2 | HEIGHT: 65 IN | WEIGHT: 160.85 LBS

## 2020-12-01 DIAGNOSIS — L29.9 ITCHING: ICD-10-CM

## 2020-12-01 DIAGNOSIS — C20 MALIGNANT NEOPLASM OF RECTUM (HCC): ICD-10-CM

## 2020-12-01 DIAGNOSIS — C78.7 SECONDARY MALIGNANT NEOPLASM OF LIVER (HCC): ICD-10-CM

## 2020-12-01 LAB
ALBUMIN SERPL BCP-MCNC: 3.5 G/DL (ref 3.2–4.9)
ALBUMIN/GLOB SERPL: 1 G/DL
ALP SERPL-CCNC: 374 U/L (ref 30–99)
ALT SERPL-CCNC: 49 U/L (ref 2–50)
ANION GAP SERPL CALC-SCNC: 10 MMOL/L (ref 7–16)
AST SERPL-CCNC: 34 U/L (ref 12–45)
BASOPHILS # BLD AUTO: 0.9 % (ref 0–1.8)
BASOPHILS # BLD: 0.06 K/UL (ref 0–0.12)
BILIRUB SERPL-MCNC: 0.5 MG/DL (ref 0.1–1.5)
BUN SERPL-MCNC: 13 MG/DL (ref 8–22)
CALCIUM SERPL-MCNC: 9.7 MG/DL (ref 8.5–10.5)
CEA SERPL-MCNC: 1.6 NG/ML (ref 0–3)
CHLORIDE SERPL-SCNC: 108 MMOL/L (ref 96–112)
CO2 SERPL-SCNC: 20 MMOL/L (ref 20–33)
CREAT SERPL-MCNC: 0.88 MG/DL (ref 0.5–1.4)
EOSINOPHIL # BLD AUTO: 0.34 K/UL (ref 0–0.51)
EOSINOPHIL NFR BLD: 4.8 % (ref 0–6.9)
ERYTHROCYTE [DISTWIDTH] IN BLOOD BY AUTOMATED COUNT: 64.2 FL (ref 35.9–50)
GLOBULIN SER CALC-MCNC: 3.5 G/DL (ref 1.9–3.5)
GLUCOSE SERPL-MCNC: 116 MG/DL (ref 65–99)
HCT VFR BLD AUTO: 40.7 % (ref 37–47)
HGB BLD-MCNC: 12.4 G/DL (ref 12–16)
IMM GRANULOCYTES # BLD AUTO: 0.04 K/UL (ref 0–0.11)
IMM GRANULOCYTES NFR BLD AUTO: 0.6 % (ref 0–0.9)
LYMPHOCYTES # BLD AUTO: 1.37 K/UL (ref 1–4.8)
LYMPHOCYTES NFR BLD: 19.5 % (ref 22–41)
MCH RBC QN AUTO: 28.6 PG (ref 27–33)
MCHC RBC AUTO-ENTMCNC: 30.5 G/DL (ref 33.6–35)
MCV RBC AUTO: 94 FL (ref 81.4–97.8)
MONOCYTES # BLD AUTO: 0.91 K/UL (ref 0–0.85)
MONOCYTES NFR BLD AUTO: 13 % (ref 0–13.4)
NEUTROPHILS # BLD AUTO: 4.3 K/UL (ref 2–7.15)
NEUTROPHILS NFR BLD: 61.2 % (ref 44–72)
NRBC # BLD AUTO: 0 K/UL
NRBC BLD-RTO: 0 /100 WBC
PLATELET # BLD AUTO: 266 K/UL (ref 164–446)
PMV BLD AUTO: 10.4 FL (ref 9–12.9)
POTASSIUM SERPL-SCNC: 4.7 MMOL/L (ref 3.6–5.5)
PROT SERPL-MCNC: 7 G/DL (ref 6–8.2)
RBC # BLD AUTO: 4.33 M/UL (ref 4.2–5.4)
SODIUM SERPL-SCNC: 138 MMOL/L (ref 135–145)
WBC # BLD AUTO: 7 K/UL (ref 4.8–10.8)

## 2020-12-01 PROCEDURE — 85025 COMPLETE CBC W/AUTO DIFF WBC: CPT

## 2020-12-01 PROCEDURE — 96374 THER/PROPH/DIAG INJ IV PUSH: CPT | Mod: XU

## 2020-12-01 PROCEDURE — 700111 HCHG RX REV CODE 636 W/ 250 OVERRIDE (IP): Performed by: NURSE PRACTITIONER

## 2020-12-01 PROCEDURE — 82378 CARCINOEMBRYONIC ANTIGEN: CPT

## 2020-12-01 PROCEDURE — G0498 CHEMO EXTEND IV INFUS W/PUMP: HCPCS

## 2020-12-01 PROCEDURE — 80053 COMPREHEN METABOLIC PANEL: CPT

## 2020-12-01 PROCEDURE — 700111 HCHG RX REV CODE 636 W/ 250 OVERRIDE (IP)

## 2020-12-01 PROCEDURE — A4212 NON CORING NEEDLE OR STYLET: HCPCS

## 2020-12-01 PROCEDURE — 99213 OFFICE O/P EST LOW 20 MIN: CPT | Performed by: NURSE PRACTITIONER

## 2020-12-01 RX ORDER — ONDANSETRON 2 MG/ML
4 INJECTION INTRAMUSCULAR; INTRAVENOUS
Status: DISCONTINUED | OUTPATIENT
Start: 2020-12-01 | End: 2020-12-01 | Stop reason: HOSPADM

## 2020-12-01 RX ORDER — HEPARIN SODIUM (PORCINE) LOCK FLUSH IV SOLN 100 UNIT/ML 100 UNIT/ML
SOLUTION INTRAVENOUS
Status: COMPLETED
Start: 2020-12-01 | End: 2020-12-01

## 2020-12-01 RX ORDER — DEXAMETHASONE SODIUM PHOSPHATE 4 MG/ML
8 INJECTION, SOLUTION INTRA-ARTICULAR; INTRALESIONAL; INTRAMUSCULAR; INTRAVENOUS; SOFT TISSUE ONCE
Status: COMPLETED | OUTPATIENT
Start: 2020-12-01 | End: 2020-12-01

## 2020-12-01 RX ADMIN — HEPARIN 5 ML: 100 SYRINGE at 07:22

## 2020-12-01 RX ADMIN — DEXAMETHASONE SODIUM PHOSPHATE 8 MG: 4 INJECTION, SOLUTION INTRAMUSCULAR; INTRAVENOUS at 08:52

## 2020-12-01 RX ADMIN — FLUOROURACIL 3515 MG: 50 INJECTION, SOLUTION INTRAVENOUS at 09:05

## 2020-12-01 ASSESSMENT — ENCOUNTER SYMPTOMS
CHILLS: 0
CONSTIPATION: 0
HEADACHES: 0
FALLS: 0
DIARRHEA: 0
VOMITING: 0
DIZZINESS: 0
COUGH: 1
WEIGHT LOSS: 0
NAUSEA: 0
SHORTNESS OF BREATH: 1
ABDOMINAL PAIN: 0
FEVER: 0

## 2020-12-01 ASSESSMENT — FIBROSIS 4 INDEX
FIB4 SCORE: 1.95

## 2020-12-01 NOTE — PROGRESS NOTES
"Pharmacy Chemotherapy Verification Note:    Patient Name: Karlene Walters      Dx: Colon Ca        Protocol: 5-FU +        *Dosing Reference*                                    -- 10/31/17: delete Leucovorin and 5-FU push d/t neutropenia per Dr. Hardy   Fluorouracil 2400 mg/m² continuous infusion over 46-48 hours                              -- 20% reduction (1920 mg/m²) to be continued starting C28 per C Alsop APRN   14 day cycles for 12 cycles (adjuvant) or until disease progression or unacceptable toxicity    NCCN Guidelines for Colon Cancer. V.2.2015.  Jay T, et al. Eur J Cancer.1999;35(9):1343-7.     Allergies:  Oxaliplatin; Codeine; Pcn [penicillins]; Sulfa drugs; and Tape     /62 Comment: Transfered over from this morning.  Pulse 98 Comment: Transfered over from this morning.  Temp 36.1 °C (97 °F) (Temporal) Comment: Transfered over from this morning. Comment (Src): Transfered over from this morning.  Resp 18 Comment: Transfered over from this morning.  Ht 1.65 m (5' 4.96\") Comment: Transfered over from this morning.  Wt 73 kg (160 lb 13.6 oz) Comment: Transfered over from this morning.  LMP  (LMP Unknown)   SpO2 99% Comment: Transfered over from this morning.  BMI 26.80 kg/m²  Body surface area is 1.83 meters squared.    Labs 12/1/20:  ANC~ 4300 Plt = 266k   Hgb = 12.4     SCr = 0.88 mg/dL CrCl ~ 59 mL/min   AST/ALT/ALK = 34/49/374 TBili = 0.5      Drug Order   (Drug name, dose, route, IV Fluid & volume, frequency, number of doses) Cycle 118 ([place] cycle 110 )     Previous treatment: 11/17/20     Medication = Fluorouracil (5-FU)  Base Dose = 1920 mg/m²  Calc Dose: Base Dose x 1.83 m² =  3513.6 mg  Final Dose = 3515 mg  Route = CIVI   Fluid & Volume = 70.3 mL (+ 3 mL overfill = 73.3 mL)  Admin Duration = Over 46 hours (to run at 1.5 mL/hour)   Via CADD pump for home infusion       <10% difference, okay to treat with final written dose     By my signature below, I confirm this process was " performed independently with the BSA and all final chemotherapy dosing calculations congruent. I have reviewed the above chemotherapy order and that my calculation of the final dose and BSA (when applicable) corroborate those calculations of the  pharmacist.     Asha Krishnan, PharmD

## 2020-12-01 NOTE — PROGRESS NOTES
Patient seen today for chemotherapy. Port accessed this morning and labs obtained prior to MD visit. Returns for Dex and 5FU pump. Assessment completed. Positive blood return from port. Dex IVP given followed by 5FU pump. All connections verified and secured. Patient discharged in good condition, ambulatory. Returns on Thursday for disconnect and deaccess.

## 2020-12-01 NOTE — PROGRESS NOTES
Chemotherapy Verification - PRIMARY RN      Height = 64.96in  Weight = 73kg  BSA = 1.83m2      Medication: Fluorouracil  Dose: 1920mg/m2  Calculated Dose: 3514mg                            (In mg/m2, AUC, mg/kg)         I confirm this process was performed independently with the BSA and all final chemotherapy dosing calculations congruent.  Any discrepancies of 10% or greater have been addressed with the chemotherapy pharmacist. The resolution of the discrepancy has been documented in the EPIC progress notes.

## 2020-12-01 NOTE — PROGRESS NOTES
Karlene into infusion services for port flush and lab draw. EMLA applied over port in left upper chest. Port site cleaned and accessed in sterile fashion with 20 gauge, 1 inch low-profile hernandez needle. Labs drawn. Pt tolerated well. Port flushed, heparin locked, and left accessed. Karlene discharged to self care and aware to return at 0800.

## 2020-12-01 NOTE — PROGRESS NOTES
Subjective:      Karlene Walters is a 80 y.o. 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  She presents unaccompanied for today's visit.  Patient is scheduled to receive cycle #118 ([place] #110) single agent 5-FU which she continues on every 2-week interval.     Cancer History  Patient with a long history of rectal carcinoma initially diagnosed in 2012.  She was found to have metastatic disease to liver and lung in 2015.  She is status post liver ablation as well as has received Y 90 in the past.  She was on XELOX initially but had allergic reaction to oxaliplatin (last dose 7/28/15) and was switched to single agent 5-FU. Patient initially on 5-FU at 2400 mg/m2 with Leucovorin and 5-FU push starting on 10/27/15, followed by 20% dose reduction to 1920 mg/m2 for cycle 18 (7/5/16), followed by deletion of Leucovorin and 5-FU push on 10/31/17. In January 2020 patient was changed to every 3-week cycle at cycle #99 ([facility] #91) and then changed to every 4-week interval in May 2020.  It was at that time there was noted to have growth of disease within the liver and was seen by Dr. West, surgeon.  Patient did undergo surgery in hopes of resection of the tumor but it was felt to be too close to the hilum and therefore she did undergo ablation instead on 5/28/20.  She has been back on single agent 5-FU at every 2-week interval, restarted on 6/16/20. Last CT 10/06/20 showed the tumor ablation cavities within the right lobe of the liver have decreased in size since previous examination.  There is a 1.5 x 3.1 cm focal area of enhancement adjacent to the ablation cavity that appears to have increased concerning for possible tumor recurrence.  Based on that finding she was sent for an MRI for further evaluation on 10/8/2020 which confirmed that the appearance of the masses noted in the liver did not have  "an appearance for suspicious finding for recurrent metastatic disease.  Therefore surgeon is requesting repeat MRI in 3 months, scheduled for 1/11/2021.    Interval History  Patient is feeling very stable.  She does continue to have some fatigue but states is not worsening.  She realizes she does not have as much energy as she used to but it is tolerable.  She continues to have a dry hacking cough, worse with the cold air.  She does have chronic shortness of breath which is undergone significant amount of work-up in the past, and this is stable.  She does note chest pain immediately after treatment.  Complete cardiac work-up has been completed as well in the past.  She does take oxycodone as needed typically on Thursday nights, night after disconnect from her pump.  She has nausea initially after chemotherapy but it is resolved with Zofran when she remembers to take it.  She is does state that she has a good appetite but after spending time making food she tends to not really want to eat.  She has noticed some itching every once around various locations of her body but relieved with cream.    Allergies   Allergen Reactions   • Oxaliplatin Anaphylaxis   • Codeine      \"gets drunk\"   • Pcn [Penicillins] Itching     itching   • Sulfa Drugs Itching     itching   • Tape Rash     PAPER TAPE OK     Current Outpatient Medications on File Prior to Visit   Medication Sig Dispense Refill   • furosemide (LASIX) 40 MG Tab      • potassium chloride ER (KLOR-CON) 10 MEQ tablet Take 1 Tab by mouth every day. 100 Tab 3   • rivaroxaban (XARELTO) 10 MG Tab tablet Take 1 Tab by mouth every day. 30 Tab 5   • ondansetron (ZOFRAN) 4 MG Tab tablet Take 1 Tab by mouth every four hours as needed. 30 Tab 5   • lidocaine-prilocaine (EMLA) 2.5-2.5 % Cream Apply to port 1 hour prior to access of port and cover with plastic wrap. 30 g 3   • Tiotropium Bromide-Olodaterol (STIOLTO RESPIMAT) 2.5-2.5 MCG/ACT Aero Soln Take 2 Puffs by mouth every day. " "1 Inhaler 11   • albuterol 108 (90 Base) MCG/ACT Aero Soln inhalation aerosol Inhale 2 Puffs by mouth every 6 hours as needed for Shortness of Breath. 8.5 g 11   • metronidazole (METROCREAM) 0.75 % cream Apply 1 Each to affected area(s) 2 times a day.     • cyanocobalamin (VITAMIN B-12) 500 MCG Tab Take 1,000 mcg by mouth every day.     • Multiple Vitamins-Minerals (CENTRUM SILVER PO) Take 1 Tab by mouth every day.     • Cholecalciferol (VITAMIN D3) 400 UNITS CAPS Take 1 Cap by mouth every day.     • loratadine (CLARITIN) 10 MG TABS Take 10 mg by mouth every day. Indications: Hayfever       No current facility-administered medications on file prior to visit.          Review of Systems   Constitutional: Positive for malaise/fatigue (present and not worsening). Negative for chills, fever and weight loss.   Respiratory: Positive for cough (dry cough still present - worse with dry cold air) and shortness of breath (chronic).    Cardiovascular: Negative for chest pain (noted immediately after treatment - complete work-up has been done - takes Oxycodone on Thursday nights after chemo disconnect).   Gastrointestinal: Negative for abdominal pain, constipation, diarrhea, nausea (initially after chemo) and vomiting.   Genitourinary: Negative for dysuria.   Musculoskeletal: Negative for falls.   Skin: Positive for itching (noted various locations on body). Negative for rash.        Chronic dry hands   Neurological: Negative for dizziness and headaches.        Feels like she loses her balance at times with fast movements          Objective:     /72   Pulse 94   Temp 36.8 °C (98.3 °F) (Temporal)   Resp 14   Ht 1.65 m (5' 4.96\")   Wt 72.8 kg (160 lb 7.9 oz)   LMP  (LMP Unknown)   SpO2 98%   BMI 26.74 kg/m²      Physical Exam  Vitals signs reviewed.   Constitutional:       General: She is not in acute distress.     Appearance: Normal appearance. She is not diaphoretic.   HENT:      Head: Normocephalic and atraumatic. "   Cardiovascular:      Rate and Rhythm: Normal rate and regular rhythm.      Pulses: Normal pulses.      Heart sounds: Normal heart sounds. No murmur. No friction rub. No gallop.    Pulmonary:      Effort: Pulmonary effort is normal. No respiratory distress.      Breath sounds: Normal breath sounds. No wheezing.   Abdominal:      General: Bowel sounds are normal. There is no distension.      Palpations: Abdomen is soft.      Tenderness: There is no abdominal tenderness.   Musculoskeletal: Normal range of motion.         General: No swelling or tenderness.   Skin:     General: Skin is warm and dry.      Comments: Dry hands   Neurological:      Mental Status: She is alert and oriented to person, place, and time.   Psychiatric:         Mood and Affect: Mood normal.         Behavior: Behavior normal.         Results for KANG HALL (MRN [id number])    Ref. Range 12/1/2020 07:20   WBC Latest Ref Range: 4.8 - 10.8 K/uL 7.0   RBC Latest Ref Range: 4.20 - 5.40 M/uL 4.33   Hemoglobin Latest Ref Range: 12.0 - 16.0 g/dL 12.4   Hematocrit Latest Ref Range: 37.0 - 47.0 % 40.7   MCV Latest Ref Range: 81.4 - 97.8 fL 94.0   MCH Latest Ref Range: 27.0 - 33.0 pg 28.6   MCHC Latest Ref Range: 33.6 - 35.0 g/dL 30.5 (L)   RDW Latest Ref Range: 35.9 - 50.0 fL 64.2 (H)   Platelet Count Latest Ref Range: 164 - 446 K/uL 266   MPV Latest Ref Range: 9.0 - 12.9 fL 10.4   Neutrophils-Polys Latest Ref Range: 44.00 - 72.00 % 61.20   Neutrophils (Absolute) Latest Ref Range: 2.00 - 7.15 K/uL 4.30   Lymphocytes Latest Ref Range: 22.00 - 41.00 % 19.50 (L)   Lymphs (Absolute) Latest Ref Range: 1.00 - 4.80 K/uL 1.37   Monocytes Latest Ref Range: 0.00 - 13.40 % 13.00   Monos (Absolute) Latest Ref Range: 0.00 - 0.85 K/uL 0.91 (H)   Eosinophils Latest Ref Range: 0.00 - 6.90 % 4.80   Eos (Absolute) Latest Ref Range: 0.00 - 0.51 K/uL 0.34   Basophils Latest Ref Range: 0.00 - 1.80 % 0.90   Baso (Absolute) Latest Ref Range: 0.00 - 0.12 K/uL 0.06    Immature Granulocytes Latest Ref Range: 0.00 - 0.90 % 0.60   Immature Granulocytes (abs) Latest Ref Range: 0.00 - 0.11 K/uL 0.04   Nucleated RBC Latest Units: /100 WBC 0.00   NRBC (Absolute) Latest Units: K/uL 0.00   Sodium Latest Ref Range: 135 - 145 mmol/L 138   Potassium Latest Ref Range: 3.6 - 5.5 mmol/L 4.7   Chloride Latest Ref Range: 96 - 112 mmol/L 108   Co2 Latest Ref Range: 20 - 33 mmol/L 20   Anion Gap Latest Ref Range: 7.0 - 16.0  10.0   Glucose Latest Ref Range: 65 - 99 mg/dL 116 (H)   Bun Latest Ref Range: 8 - 22 mg/dL 13   Creatinine Latest Ref Range: 0.50 - 1.40 mg/dL 0.88   GFR If  Latest Ref Range: >60 mL/min/1.73 m 2 >60   GFR If Non  Latest Ref Range: >60 mL/min/1.73 m 2 >60   Calcium Latest Ref Range: 8.5 - 10.5 mg/dL 9.7   AST(SGOT) Latest Ref Range: 12 - 45 U/L 34   ALT(SGPT) Latest Ref Range: 2 - 50 U/L 49   Alkaline Phosphatase Latest Ref Range: 30 - 99 U/L 374 (H)   Total Bilirubin Latest Ref Range: 0.1 - 1.5 mg/dL 0.5   Albumin Latest Ref Range: 3.2 - 4.9 g/dL 3.5   Total Protein Latest Ref Range: 6.0 - 8.2 g/dL 7.0   Globulin Latest Ref Range: 1.9 - 3.5 g/dL 3.5   A-G Ratio Latest Units: g/dL 1.0   Carcinoembryonic Antigen Latest Ref Range: 0.0 - 3.0 ng/mL 1.6          Assessment/Plan:        1. Malignant neoplasm of rectum (HCC)     2. Secondary malignant neoplasm of liver (HCC)     3. Itching       Plan  1. Metastatic rectal cancer to liver - on single agent 5-FU at a 20% dose reduction.  She is scheduled to proceed with cycle #118 ([place] #110) today.  Clinically she is very stable, and I did review her CBC and CMP and CEA and labs are appropriate to proceed with treatment as planned.    Patient's Alk phos elevated today, and will watch this closely.     2. Patient with generalized itching - recommend Benadryl cream as needed to areas.     3. Patient to follow up in 2 weeks or sooner if needed.

## 2020-12-01 NOTE — PROGRESS NOTES
Chemotherapy Verification - SECONDARY RN       Height = 64.96 inches  Weight = 73 kg  BSA = 1.83 m2       Medication: Fluorouracil (Adrucil)  Dose: 1920 mg/m2  Calculated Dose: 3,513.6 mg in CADD pump over 46 hrs.                             (In mg/m2, AUC, mg/kg)         I confirm that this process was performed independently.

## 2020-12-01 NOTE — PROGRESS NOTES
"Pharmacy Chemotherapy Verification Note:    Patient Name: Karlene Walters      Dx: Colon Ca        Cycle 118 ([place] cycle 110)     Previous treatment: 11/3/20    Protocol: 5-FU + Leucovorin      followed by     7/5/16: Dose reduced by 20% to 320 mg/m² starting with Cycle 18 ([place] #11)  due to neutropenia     10/31/17: delete Leucovorin and 5-FU push d/t neutropenia per Dr. Hardy   Fluorouracil continuous infusion over 46-48 hours     7/5/16: Infusion Dose reduced by 20% starting with Cycle 18 ([place] #11)  due to neutropenia    20% reduction (1920 mg/m²) to be continued starting C28 per C Alsop APRN   14 day cycles for 12 cycles (adjuvant) or until disease progression or unacceptable toxicity  NCCN Guidelines for Colon Cancer. V.2.2015.  Jay GROVER, et al. Eur J Cancer.1999;35(9):1343-7.     Allergies:  Oxaliplatin; Codeine; Pcn [penicillins]; Sulfa drugs; and Tape     /62 Comment: Transfered over from this morning.  Pulse 98 Comment: Transfered over from this morning.  Temp 36.1 °C (97 °F) (Temporal) Comment: Transfered over from this morning. Comment (Src): Transfered over from this morning.  Resp 18 Comment: Transfered over from this morning.  Ht 1.65 m (5' 4.96\") Comment: Transfered over from this morning.  Wt 73 kg (160 lb 13.6 oz) Comment: Transfered over from this morning.  LMP  (LMP Unknown)   SpO2 99% Comment: Transfered over from this morning.  BMI 26.80 kg/m²  Body surface area is 1.83 meters squared.    Labs from 12/1/20 reviewed - all within treatment parameters.       Fluorouracil (5-FU) 1920 mg/m² x 1.83m² =  3514mg   <10% difference, okay to treat with final dose = 3515mg CIVI over 46 hrs                 Nakia Arroyo, PharmD      "

## 2020-12-02 ENCOUNTER — TELEPHONE (OUTPATIENT)
Dept: ONCOLOGY | Facility: MEDICAL CENTER | Age: 80
End: 2020-12-02

## 2020-12-02 ENCOUNTER — OFFICE VISIT (OUTPATIENT)
Dept: SLEEP MEDICINE | Facility: MEDICAL CENTER | Age: 80
End: 2020-12-02
Payer: MEDICARE

## 2020-12-02 ENCOUNTER — NON-PROVIDER VISIT (OUTPATIENT)
Dept: SLEEP MEDICINE | Facility: MEDICAL CENTER | Age: 80
End: 2020-12-02
Attending: NURSE PRACTITIONER
Payer: MEDICARE

## 2020-12-02 VITALS
HEIGHT: 65 IN | DIASTOLIC BLOOD PRESSURE: 60 MMHG | SYSTOLIC BLOOD PRESSURE: 118 MMHG | HEART RATE: 91 BPM | OXYGEN SATURATION: 94 % | WEIGHT: 157 LBS | RESPIRATION RATE: 18 BRPM | BODY MASS INDEX: 26.16 KG/M2

## 2020-12-02 VITALS — HEIGHT: 65 IN | BODY MASS INDEX: 26.16 KG/M2 | WEIGHT: 157 LBS

## 2020-12-02 DIAGNOSIS — C43.9 MALIGNANT MELANOMA OF SKIN (HCC): ICD-10-CM

## 2020-12-02 DIAGNOSIS — R91.8 OTHER NONSPECIFIC ABNORMAL FINDING OF LUNG FIELD: ICD-10-CM

## 2020-12-02 DIAGNOSIS — I10 ESSENTIAL HYPERTENSION: ICD-10-CM

## 2020-12-02 DIAGNOSIS — Z86.711 HISTORY OF PULMONARY EMBOLUS (PE): ICD-10-CM

## 2020-12-02 DIAGNOSIS — C20 MALIGNANT NEOPLASM OF RECTUM (HCC): Chronic | ICD-10-CM

## 2020-12-02 DIAGNOSIS — Z78.9 NONSMOKER: ICD-10-CM

## 2020-12-02 DIAGNOSIS — C78.7 SECONDARY MALIGNANT NEOPLASM OF LIVER (HCC): Chronic | ICD-10-CM

## 2020-12-02 DIAGNOSIS — J43.9 PULMONARY EMPHYSEMA, UNSPECIFIED EMPHYSEMA TYPE (HCC): ICD-10-CM

## 2020-12-02 DIAGNOSIS — R06.00 DYSPNEA, UNSPECIFIED TYPE: ICD-10-CM

## 2020-12-02 DIAGNOSIS — R06.02 SHORTNESS OF BREATH: ICD-10-CM

## 2020-12-02 PROCEDURE — 94726 PLETHYSMOGRAPHY LUNG VOLUMES: CPT | Performed by: INTERNAL MEDICINE

## 2020-12-02 PROCEDURE — 99999 PULMONARY FUNCTION TESTS: CPT | Performed by: INTERNAL MEDICINE

## 2020-12-02 PROCEDURE — 99214 OFFICE O/P EST MOD 30 MIN: CPT | Performed by: NURSE PRACTITIONER

## 2020-12-02 PROCEDURE — 94060 EVALUATION OF WHEEZING: CPT | Performed by: INTERNAL MEDICINE

## 2020-12-02 RX ORDER — TIOTROPIUM BROMIDE AND OLODATEROL 3.124; 2.736 UG/1; UG/1
2 SPRAY, METERED RESPIRATORY (INHALATION) DAILY
Qty: 2 EACH | Refills: 0 | COMMUNITY
Start: 2020-12-02 | End: 2021-05-04

## 2020-12-02 ASSESSMENT — PULMONARY FUNCTION TESTS
FEV1: 2.33
FEV1/FVC_PERCENT_CHANGE: -1
FVC_PERCENT_PREDICTED: 109
FEV1_LLN: 1.67
FEV1_PERCENT_CHANGE: -2
FEV1/FVC_PERCENT_PREDICTED: 106
FEV1_PREDICTED: 2
FEV1/FVC_PERCENT_LLN: 64
FVC_PERCENT_PREDICTED: 112
FEV1/FVC_PERCENT_PREDICTED: 104
FVC_PREDICTED: 2.63
FEV1_PERCENT_PREDICTED: 116
FEV1/FVC: 80.62
FEV1/FVC_PERCENT_PREDICTED: 76
FEV1_PERCENT_PREDICTED: 121
FEV1: 2.42
FEV1/FVC_PERCENT_PREDICTED: 106
FVC: 2.89
FVC_LLN: 2.20
FEV1/FVC: 82
FEV1/FVC: 82
FEV1_PERCENT_CHANGE: -3
FEV1/FVC_PERCENT_CHANGE: 150
FEV1/FVC_PREDICTED: 77
FEV1/FVC_PERCENT_PREDICTED: 108
FEV1/FVC: 81
FVC: 2.96

## 2020-12-02 ASSESSMENT — FIBROSIS 4 INDEX
FIB4 SCORE: 1.46
FIB4 SCORE: 1.46

## 2020-12-02 NOTE — PROCEDURES
Tech: Chen Vázquez, RRT, CPFT  Tech notes: Good patient effort & cooperation.  The results of this test meet the ATS/ERS standards for acceptability & reproducibility.  Test was performed on the [place] [facility] Pleth[facility]-Elite DX system.  Predicted values were GLI-2012 for spirometry, GLI- 2017 for DLCO, ITS for Lung Volumes.  The DLCO was uncorrected for Hgb.  A bronchodilator of Xopenex HFA -2puffs via spacer administered.  DLCO performed during dilation period.    Interpretation:  1.  Baseline spirometry shows normal airflows.  2.  There is no significant bronchodilator response.  3.  Total lung capacity is within normal limits at 4.77 L or 91% predicted.  4.  Diffusion capacity is within normal limits.  Normal pulmonary function testing with normal flow volume loop.  This does not rule out reactive airways disease.  Suggest clinical correlation.

## 2020-12-02 NOTE — PROGRESS NOTES
Chief Complaint   Patient presents with   • Follow-Up     Dyspnea // Last seen 7/8/2020    • Results     PFT        HPI:  Karlene Walters is a 80 y.o. year old female here today for follow-up on dyspnea.    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 Also followed by Dr. West surgery and has had multiple ablations but not candidate for resection. Currently undergoing chemotherapy.  Serial CT's dictated by oncology.     Hx of COPD per Dr. Warren's prior assessment, but no hx of smoking - most likely chronic obstructive asthma. Query dyspnea r/t to ongoing chemo and hx of PE.  Hx of PE and on Xarelto chronically.     Never smoker.   PFT 7/17/2019 indicates normal findings with FEV1 2.37 L 114%, FEV1/FVC ratio 82, TLC 95% predicted, DLCO 97% predicted.  No significant bronchodilator response noted.    PFT 12/2/20 notes FVC 2.96L or 112%, FEV1 2.42L or 121%, FEV1/FVC ratio 82, TLC 91% and DLCo 92%; no significant bronchodilator. Reviewed with patient.  Patient remains on Stiolto and albuterol HFA inhaler.    Prior echo indicated normal LV function.   HRCT of chest was performed previously  to rule out ILD especially in light of her history of cancer and chemotherapy. This indicated emphysematous lungs and some areas of basilar atelectasis but no evidence of ILD.   She was started on Stiolto 2 puffs QD and MATEO. She feels her breathing has improved overall.   CNOX 7/30/19 indicated only 3.3min <88% with avg O2 sat 93.7%.  Prior PET scan 5/14/20 noted no FDG updated in pulmonary nodules or hilar, mediastinal or axillary lymph nodes. Uptake only noted in right hepatic dome.  CT abdomen/pelvis 10/5/20 indicates 6.8 mm opacity anterior base right lung base is unchanged. 5.6 mm right anterior epiphrenic lymph node unchanged. Will continue to monitor but patient not high risk.  Recommend updated CT of chest 5/2021. Patient is amenable.    Today she notes dyspnea to be stable  and only noted with vacuuming and making bed. She uses MATEO prn with some benefit; reviewed appropriate use. PFT today notes normal lung function. She has gone 4 days without Stiolto and noted worsening of dyspnea. She will continue to benefit from inhaler therapy but noting change in insurnace soon. Advised obtaining formulary. She notes mild dry cough but no other respiratory complaints.    ROS: As per HPI and otherwise negative if not stated.    Past Medical History:   Diagnosis Date   • Adverse effect of anesthesia     elevated BP postop   • Anesthesia    • Arrhythmia     occasional   • Blood clotting disorder (HCC) 08/2015    bilat PE    • Blood transfusion 1957   • Breath shortness     pt reports d/t chemo treatment; no O2; with moderate exertion; no c/o at this time   • Cancer (HCC) 09/2012    rectal cancer,  Liver CA-2015   • CATARACT     removed b/l   • Chemotherapy-induced neutropenia (HCC) 9/28/2016   • Chickenpox     history of   • Colon cancer (HCC)    • Dental disorder     dentures UPPERS AND LOWERS   • Elevated alkaline phosphatase level 11/15/2017   • Emphysema of lung (HCC)     COPD   • Fall    • [place] measles     history of   • Heart murmur     as a child   • Hemorrhagic disorder (HCC)     on Xarelto    • High cholesterol    • Hypercholesteremia    • Hypertension     no meds currently    • Ileostomy in place (HCC)    • Ileostomy in place (HCC)    • Indigestion    • Influenza     history of   • Lightheadedness 9/17/2015   • Mumps     history of   • Obstruction     ileostomy   • Other specified symptom associated with female genital organs     HYSTERECTOMY 1993   • Pneumonia 05/2017    tx'd with antibx   • Pulmonary embolism (HCC)    • 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)    • Renal insufficiency 3/14/2016   • Restless legs 5/6/2020   • Routine general medical examination at a health care facility 3/14/2016    3/14/16   • Seasonal allergies    • Swelling of both ankles     Lasix PRN   •  Tonsillitis        Past Surgical History:   Procedure Laterality Date   • HEPATIC ABLATION  5/28/2020    Procedure: ABLATION, LESION, LIVER-TRISEGMENTECTOMY, MICROWAVE ABLATION, INTRA OPERATIVE ULTRA SOUND, SIMPLE RESECTED / REPAIR OF DIAPHRAGM;  Surgeon: Kit West M.D.;  Location: SURGERY [facility];  Service: General   • HEPATIC ABLATION LAPAROSCOPIC  11/15/2018    Procedure: HEPATIC ABLATION LAPAROSCOPIC.- SEGMENT 7/8;  Surgeon: Kit West M.D.;  Location: SURGERY [facility];  Service: General   • COLONOSCOPY WITH BIOPSY  8/23/2015    Procedure: COLONOSCOPY WITH BIOPSY;  Surgeon: Wilbur Glasgow M.D.;  Location: ENDOSCOPY [facility];  Service:    • HEPATIC ABLATION LAPAROSCOPIC  7/6/2015    Procedure: HEPATIC ABLATION LAPAROSCOPIC.;  Surgeon: Kit West M.D.;  Location: SURGERY [facility];  Service:    • CATH PLACEMENT Left 7/6/2015    Procedure: CATH PLACEMENT CEPHALIC POWERPORT;  Surgeon: Kit West M.D.;  Location: SURGERY [facility];  Service:    • FISTULA IN ANO REPAIR  1/28/2015    Performed by Kolton Montgomery M.D. at [facility]   • PERINEAL PROCEDURE  1/28/2015    Performed by Kolton Montgomery M.D. at [facility]   • FISTULA IN ANO REPAIR  10/15/2014    Performed by Kolton Montgomery M.D. at [facility]   • PERINEAL PROCEDURE  10/15/2014    Performed by Kolton Montgomery M.D. at [facility]   • IRRIGATION & DEBRIDEMENT GENERAL  2/19/2014    Performed by Kolton Montgomery M.D. at [facility]   • FLAP GRAFT  2/19/2014    Performed by Kolton Montgomery M.D. at [facility]   • PERINEAL PROCEDURE  12/18/2013    Performed by Kolton Montgomery M.D. at [facility]   • MYOCUTANEOUS FLAP  12/18/2013    Performed by Kolton Montgomery M.D. at [facility]   • IRRIGATION & DEBRIDEMENT GENERAL  10/23/2013    Performed by Kolton Montgomery M.D. at [facility]   •  FISTULA IN ANO REPAIR  9/4/2013    Performed by Kolton Montgomery M.D. at SURGERY [facility]   • FLAP ROTATION  9/4/2013    Performed by Kolton Montgomery M.D. at SURGERY [facility]   • RECOVERY  8/26/2013    Performed by [facility]Rec[facility] Surgery at [facility] SAME DAY [facility]   • BIOPSY GENERAL  7/17/2013    Performed by Kolton Montgomery M.D. at SURGERY [facility]   • PROCTOSCOPY  7/17/2013    Performed by Kolton Montgomery M.D. at SURGERY [facility]   • FISTULA IN ANO REPAIR  2/27/2013    Performed by Kolton Montgomery M.D. at SURGERY [facility]   • SIGMOIDOSCOPY  2/27/2013    Performed by Kolton Montgomery M.D. at SURGERY [facility]   • LAPAROSCOPY  10/8/2012    Performed by Kolton Montgomery M.D. at [facility]   • ILEO LOOP DIVERSION  10/8/2012    Performed by Kolton Montgomery M.D. at SURGERY [facility]   • COLECTOMY  9/26/2012    Performed by Kolton Montgomery M.D. at SURGERY [facility]   • COLONOSCOPY FLEX W/ENDO US  9/20/2012    Performed by Harshil Bolton M.D. at [facility]   • OTHER  2009    left eye torn retina repair   • CATARACT EXTRACTION WITH IOL  2004    bilateral   • ABDOMINAL HYSTERECTOMY TOTAL  1983   • CYST EXCISION  1972    ovarian   • COLON RESECTION     • EYE SURGERY      cataracts   • HYSTERECTOMY LAPAROSCOPY     • PB REMV 2ND CATARACT,CORN-SCLER SECTN     • TONSILLECTOMY         Family History   Problem Relation Age of Onset   • Heart Disease Mother    • Heart Failure Mother    • Hypertension Mother    • Hyperlipidemia Mother    • Cancer Mother    • Cancer Maternal Aunt 60        breast ca   • Lung Disease Brother         COPD/Emphysema/Smoker   • Cancer Brother         prostate cancer   • Alcohol/Drug Brother         Alcohol   • Kidney Disease Father         renal failure       Social History     Socioeconomic History   • Marital status:      Spouse name: Not on file   • Number of children: Not on file   • Years of education: Not on file   • Highest  "education level: Not on file   Occupational History   • Not on file   Social Needs   • Financial resource strain: Not hard at all   • Food insecurity     Worry: Never true     Inability: Never true   • Transportation needs     Medical: No     Non-medical: No   Tobacco Use   • Smoking status: Never Smoker   • Smokeless tobacco: Never Used   Substance and Sexual Activity   • Alcohol use: No   • Drug use: No   • Sexual activity: Never     Partners: Male     Birth control/protection: Post-Menopausal   Lifestyle   • Physical activity     Days per week: Not on file     Minutes per session: Not on file   • Stress: Not on file   Relationships   • Social connections     Talks on phone: Not on file     Gets together: Not on file     Attends Orthodoxy service: Not on file     Active member of club or organization: Not on file     Attends meetings of clubs or organizations: Not on file     Relationship status: Not on file   • Intimate partner violence     Fear of current or ex partner: Not on file     Emotionally abused: Not on file     Physically abused: Not on file     Forced sexual activity: Not on file   Other Topics Concern   • Primary/coprimary nurse & associates Not Asked   • Family contact information Not Asked   • OK to release patient information to the following Not Asked   • Patient preferred routine/Privacy concerns Not Asked   • Patient likes and dislikes Not Asked   • Participating In Research Study Not Asked   • Miscellaneous Not Asked   Social History Narrative   • Not on file       Allergies as of 12/02/2020 - Reviewed 12/02/2020   Allergen Reaction Noted   • Oxaliplatin Anaphylaxis 10/27/2015   • Codeine  04/05/2011   • Pcn [penicillins] Itching 04/05/2011   • Sulfa drugs Itching 04/05/2011   • Tape Rash 09/04/2013        Vitals:  /60 (BP Location: Left arm, Patient Position: Sitting, BP Cuff Size: Adult)   Pulse 91   Resp (!) 99   Ht 1.651 m (5' 5\")   Wt 71.2 kg (157 lb)     Current medications as " of today   Current Outpatient Medications   Medication Sig Dispense Refill   • furosemide (LASIX) 40 MG Tab      • potassium chloride ER (KLOR-CON) 10 MEQ tablet Take 1 Tab by mouth every day. 100 Tab 3   • rivaroxaban (XARELTO) 10 MG Tab tablet Take 1 Tab by mouth every day. 30 Tab 5   • ondansetron (ZOFRAN) 4 MG Tab tablet Take 1 Tab by mouth every four hours as needed. 30 Tab 5   • lidocaine-prilocaine (EMLA) 2.5-2.5 % Cream Apply to port 1 hour prior to access of port and cover with plastic wrap. 30 g 3   • Tiotropium Bromide-Olodaterol (STIOLTO RESPIMAT) 2.5-2.5 MCG/ACT Aero Soln Take 2 Puffs by mouth every day. 1 Inhaler 11   • albuterol 108 (90 Base) MCG/ACT Aero Soln inhalation aerosol Inhale 2 Puffs by mouth every 6 hours as needed for Shortness of Breath. 8.5 g 11   • metronidazole (METROCREAM) 0.75 % cream Apply 1 Each to affected area(s) 2 times a day.     • cyanocobalamin (VITAMIN B-12) 500 MCG Tab Take 1,000 mcg by mouth every day.     • Multiple Vitamins-Minerals (CENTRUM SILVER PO) Take 1 Tab by mouth every day.     • Cholecalciferol (VITAMIN D3) 400 UNITS CAPS Take 1 Cap by mouth every day.     • loratadine (CLARITIN) 10 MG TABS Take 10 mg by mouth every day. Indications: Hayfever       No current facility-administered medications for this visit.          Physical Exam:   Gen:           Alert and oriented, No apparent distress. Mood and affect appropriate, normal interaction with examiner.  Eyes:          PERRL, EOM intact, sclere white, conjunctive moist.  Ears:          Not examined.   Hearing:     Grossly intact.  Nose:          Normal, no lesions or deformities.  Dentition:    Good dentition.  Oropharynx:   mask  Mallampati Classification: mask  Neck:        Supple, trachea midline, no masses.  Respiratory Effort: No intercostal retractions or use of accessory muscles.   Lung Auscultation:      Clear to auscultation bilaterally; no rales, rhonchi or wheezing.  CV:            Regular rate and  rhythm. No murmurs, rubs or gallops.  Abd:           Not examined.  Lymphadenopathy: Not examined.  Gait and Station: Normal.  Digits and Nails: No clubbing, cyanosis, petechiae, or nodes.   Cranial Nerves: II-XII grossly intact.  Skin:        No rashes, lesions or ulcers noted.               Ext:           No cyanosis or edema.      Assessment:  1. Shortness of breath     2. Malignant neoplasm of rectum (HCC)     3. Secondary malignant neoplasm of liver (HCC)     4. Essential hypertension     5. BMI 26.0-26.9,adult     6. Nonsmoker         Immunizations:    Flu:recommend  Pneumovax 23:2006  Prevnar 13:2018    Plan:  1. Dyspnea is clinically stable and may be r/t to ongoing chemo and hx of PE but patient notes benefit with LAMA/LABA use. NO hx of smoking.  Continue current bronchodilators; sample provided. She will obtain her new formulary.  2. Discussed respiratory hygiene.  3. CT of chest w/o contrast due 5/2021 for ongoing monitoring of nodules and r/o of metastesis from liver.  4. Encouraged routine walking.  5. F/u with PCP for other health concerns.  6. F/u in 5-6mos with updated CT of chest, sooner if needed.    Please note that this dictation was created using voice recognition software. I have made every reasonable attempt to correct obvious errors, but it is possible there are errors of grammar and possibly content that I did not discover before finalizing the note.

## 2020-12-02 NOTE — TELEPHONE ENCOUNTER
"Called pt regarding COVID-19 screening questions and updated check-in process at this time. Pt states understanding of updated process and is aware to call provided number. Patient answered \"no\" to screening questions. Pt is ok to proceed with treatment as scheduled for 12/03/20.  "

## 2020-12-03 ENCOUNTER — OUTPATIENT INFUSION SERVICES (OUTPATIENT)
Dept: ONCOLOGY | Facility: MEDICAL CENTER | Age: 80
End: 2020-12-03
Attending: INTERNAL MEDICINE
Payer: MEDICARE

## 2020-12-03 VITALS
RESPIRATION RATE: 18 BRPM | HEART RATE: 113 BPM | SYSTOLIC BLOOD PRESSURE: 144 MMHG | OXYGEN SATURATION: 99 % | TEMPERATURE: 97.1 F | DIASTOLIC BLOOD PRESSURE: 86 MMHG

## 2020-12-03 DIAGNOSIS — C20 MALIGNANT NEOPLASM OF RECTUM (HCC): ICD-10-CM

## 2020-12-03 PROCEDURE — 96523 IRRIG DRUG DELIVERY DEVICE: CPT

## 2020-12-03 PROCEDURE — 700111 HCHG RX REV CODE 636 W/ 250 OVERRIDE (IP): Performed by: NURSE PRACTITIONER

## 2020-12-03 RX ORDER — DEXAMETHASONE SODIUM PHOSPHATE 4 MG/ML
8 INJECTION, SOLUTION INTRA-ARTICULAR; INTRALESIONAL; INTRAMUSCULAR; INTRAVENOUS; SOFT TISSUE ONCE
Status: CANCELLED | OUTPATIENT
Start: 2020-12-15 | End: 2020-12-15

## 2020-12-03 RX ORDER — HEPARIN SODIUM (PORCINE) LOCK FLUSH IV SOLN 100 UNIT/ML 100 UNIT/ML
500 SOLUTION INTRAVENOUS PRN
Status: CANCELLED | OUTPATIENT
Start: 2020-12-15

## 2020-12-03 RX ORDER — ONDANSETRON 8 MG/1
8 TABLET, ORALLY DISINTEGRATING ORAL
Status: CANCELLED | OUTPATIENT
Start: 2020-12-15

## 2020-12-03 RX ORDER — LIDOCAINE HYDROCHLORIDE 10 MG/ML
10 INJECTION, SOLUTION INFILTRATION; PERINEURAL ONCE
Status: CANCELLED | OUTPATIENT
Start: 2020-12-15 | End: 2020-12-15

## 2020-12-03 RX ORDER — ONDANSETRON 2 MG/ML
4 INJECTION INTRAMUSCULAR; INTRAVENOUS
Status: CANCELLED | OUTPATIENT
Start: 2020-12-15

## 2020-12-03 RX ORDER — PROCHLORPERAZINE MALEATE 10 MG
10 TABLET ORAL EVERY 6 HOURS PRN
Status: CANCELLED | OUTPATIENT
Start: 2020-12-15

## 2020-12-03 RX ORDER — HEPARIN SODIUM (PORCINE) LOCK FLUSH IV SOLN 100 UNIT/ML 100 UNIT/ML
500 SOLUTION INTRAVENOUS PRN
Status: CANCELLED | OUTPATIENT
Start: 2020-12-17

## 2020-12-03 RX ORDER — HEPARIN SODIUM (PORCINE) LOCK FLUSH IV SOLN 100 UNIT/ML 100 UNIT/ML
500 SOLUTION INTRAVENOUS PRN
Status: DISCONTINUED | OUTPATIENT
Start: 2020-12-03 | End: 2020-12-03 | Stop reason: HOSPADM

## 2020-12-03 RX ORDER — DEXTROSE MONOHYDRATE 50 MG/ML
INJECTION, SOLUTION INTRAVENOUS CONTINUOUS
Status: CANCELLED | OUTPATIENT
Start: 2020-12-15

## 2020-12-03 RX ADMIN — HEPARIN 500 UNITS: 100 SYRINGE at 09:35

## 2020-12-03 NOTE — PROGRESS NOTES
Pt returns for 5FU pump de-access. Pump settings verified at 0 ml. Port flushed, blood return observed. Pump removed and port heparin locked. Needle removed, tip intact, gauze dressing applied. Pt knows to return on Dec 15, time confirmed. Pt discharged home under self care in no apparent distress.

## 2020-12-14 ENCOUNTER — TELEPHONE (OUTPATIENT)
Dept: ONCOLOGY | Facility: MEDICAL CENTER | Age: 80
End: 2020-12-14

## 2020-12-14 NOTE — TELEPHONE ENCOUNTER
Patient called for COVID screen prior to appointment tomorrow.  No answer.  Left voicemail to call clinic.

## 2020-12-15 ENCOUNTER — OUTPATIENT INFUSION SERVICES (OUTPATIENT)
Dept: ONCOLOGY | Facility: MEDICAL CENTER | Age: 80
End: 2020-12-15
Attending: INTERNAL MEDICINE
Payer: MEDICARE

## 2020-12-15 ENCOUNTER — OFFICE VISIT (OUTPATIENT)
Dept: HEMATOLOGY ONCOLOGY | Facility: MEDICAL CENTER | Age: 80
End: 2020-12-15
Payer: MEDICARE

## 2020-12-15 VITALS
HEIGHT: 65 IN | WEIGHT: 160.27 LBS | TEMPERATURE: 98.5 F | BODY MASS INDEX: 26.7 KG/M2 | DIASTOLIC BLOOD PRESSURE: 72 MMHG | OXYGEN SATURATION: 97 % | RESPIRATION RATE: 14 BRPM | SYSTOLIC BLOOD PRESSURE: 132 MMHG | HEART RATE: 99 BPM

## 2020-12-15 VITALS
OXYGEN SATURATION: 99 % | RESPIRATION RATE: 18 BRPM | HEART RATE: 95 BPM | HEIGHT: 65 IN | DIASTOLIC BLOOD PRESSURE: 69 MMHG | WEIGHT: 160.5 LBS | BODY MASS INDEX: 26.74 KG/M2 | SYSTOLIC BLOOD PRESSURE: 124 MMHG | TEMPERATURE: 97.5 F

## 2020-12-15 VITALS
OXYGEN SATURATION: 99 % | RESPIRATION RATE: 18 BRPM | TEMPERATURE: 97.5 F | DIASTOLIC BLOOD PRESSURE: 69 MMHG | HEART RATE: 95 BPM | SYSTOLIC BLOOD PRESSURE: 124 MMHG | WEIGHT: 160.5 LBS | HEIGHT: 65 IN | BODY MASS INDEX: 26.74 KG/M2

## 2020-12-15 DIAGNOSIS — C20 MALIGNANT NEOPLASM OF RECTUM (HCC): ICD-10-CM

## 2020-12-15 DIAGNOSIS — Z79.01 CURRENT USE OF LONG TERM ANTICOAGULATION: Chronic | ICD-10-CM

## 2020-12-15 DIAGNOSIS — C20 MALIGNANT NEOPLASM OF RECTUM (HCC): Chronic | ICD-10-CM

## 2020-12-15 DIAGNOSIS — R26.89 POOR BALANCE: ICD-10-CM

## 2020-12-15 DIAGNOSIS — Z86.711 HISTORY OF PULMONARY EMBOLUS (PE): ICD-10-CM

## 2020-12-15 DIAGNOSIS — C78.7 SECONDARY MALIGNANT NEOPLASM OF LIVER (HCC): Chronic | ICD-10-CM

## 2020-12-15 DIAGNOSIS — M79.10 MYALGIA: ICD-10-CM

## 2020-12-15 DIAGNOSIS — Z79.899 ENCOUNTER FOR LONG-TERM (CURRENT) USE OF HIGH-RISK MEDICATION: ICD-10-CM

## 2020-12-15 LAB
ALBUMIN SERPL BCP-MCNC: 3.7 G/DL (ref 3.2–4.9)
ALBUMIN/GLOB SERPL: 1.2 G/DL
ALP SERPL-CCNC: 295 U/L (ref 30–99)
ALT SERPL-CCNC: 27 U/L (ref 2–50)
ANION GAP SERPL CALC-SCNC: 10 MMOL/L (ref 7–16)
AST SERPL-CCNC: 30 U/L (ref 12–45)
BASOPHILS # BLD AUTO: 1 % (ref 0–1.8)
BASOPHILS # BLD: 0.08 K/UL (ref 0–0.12)
BILIRUB SERPL-MCNC: 0.7 MG/DL (ref 0.1–1.5)
BUN SERPL-MCNC: 15 MG/DL (ref 8–22)
CALCIUM SERPL-MCNC: 9.6 MG/DL (ref 8.5–10.5)
CEA SERPL-MCNC: 1.6 NG/ML (ref 0–3)
CHLORIDE SERPL-SCNC: 103 MMOL/L (ref 96–112)
CO2 SERPL-SCNC: 21 MMOL/L (ref 20–33)
CREAT SERPL-MCNC: 0.95 MG/DL (ref 0.5–1.4)
EOSINOPHIL # BLD AUTO: 0.25 K/UL (ref 0–0.51)
EOSINOPHIL NFR BLD: 3.2 % (ref 0–6.9)
ERYTHROCYTE [DISTWIDTH] IN BLOOD BY AUTOMATED COUNT: 57.7 FL (ref 35.9–50)
GLOBULIN SER CALC-MCNC: 3.2 G/DL (ref 1.9–3.5)
GLUCOSE SERPL-MCNC: 109 MG/DL (ref 65–99)
HCT VFR BLD AUTO: 39.6 % (ref 37–47)
HGB BLD-MCNC: 12.4 G/DL (ref 12–16)
IMM GRANULOCYTES # BLD AUTO: 0.04 K/UL (ref 0–0.11)
IMM GRANULOCYTES NFR BLD AUTO: 0.5 % (ref 0–0.9)
LYMPHOCYTES # BLD AUTO: 1.21 K/UL (ref 1–4.8)
LYMPHOCYTES NFR BLD: 15.5 % (ref 22–41)
MCH RBC QN AUTO: 28.5 PG (ref 27–33)
MCHC RBC AUTO-ENTMCNC: 31.3 G/DL (ref 33.6–35)
MCV RBC AUTO: 91 FL (ref 81.4–97.8)
MONOCYTES # BLD AUTO: 0.79 K/UL (ref 0–0.85)
MONOCYTES NFR BLD AUTO: 10.1 % (ref 0–13.4)
NEUTROPHILS # BLD AUTO: 5.42 K/UL (ref 2–7.15)
NEUTROPHILS NFR BLD: 69.7 % (ref 44–72)
NRBC # BLD AUTO: 0 K/UL
NRBC BLD-RTO: 0 /100 WBC
PLATELET # BLD AUTO: 289 K/UL (ref 164–446)
PMV BLD AUTO: 9.8 FL (ref 9–12.9)
POTASSIUM SERPL-SCNC: 4.1 MMOL/L (ref 3.6–5.5)
PROT SERPL-MCNC: 6.9 G/DL (ref 6–8.2)
RBC # BLD AUTO: 4.35 M/UL (ref 4.2–5.4)
SODIUM SERPL-SCNC: 134 MMOL/L (ref 135–145)
WBC # BLD AUTO: 7.8 K/UL (ref 4.8–10.8)

## 2020-12-15 PROCEDURE — 99214 OFFICE O/P EST MOD 30 MIN: CPT | Performed by: NURSE PRACTITIONER

## 2020-12-15 PROCEDURE — 96374 THER/PROPH/DIAG INJ IV PUSH: CPT | Mod: XU

## 2020-12-15 PROCEDURE — G0498 CHEMO EXTEND IV INFUS W/PUMP: HCPCS

## 2020-12-15 PROCEDURE — 80053 COMPREHEN METABOLIC PANEL: CPT

## 2020-12-15 PROCEDURE — 700111 HCHG RX REV CODE 636 W/ 250 OVERRIDE (IP): Performed by: NURSE PRACTITIONER

## 2020-12-15 PROCEDURE — 82378 CARCINOEMBRYONIC ANTIGEN: CPT

## 2020-12-15 PROCEDURE — 96375 TX/PRO/DX INJ NEW DRUG ADDON: CPT

## 2020-12-15 PROCEDURE — 85025 COMPLETE CBC W/AUTO DIFF WBC: CPT

## 2020-12-15 PROCEDURE — A4212 NON CORING NEEDLE OR STYLET: HCPCS

## 2020-12-15 RX ORDER — DEXAMETHASONE SODIUM PHOSPHATE 4 MG/ML
8 INJECTION, SOLUTION INTRA-ARTICULAR; INTRALESIONAL; INTRAMUSCULAR; INTRAVENOUS; SOFT TISSUE ONCE
Status: COMPLETED | OUTPATIENT
Start: 2020-12-15 | End: 2020-12-15

## 2020-12-15 RX ADMIN — FLUOROURACIL 3495 MG: 50 INJECTION, SOLUTION INTRAVENOUS at 09:22

## 2020-12-15 RX ADMIN — DEXAMETHASONE SODIUM PHOSPHATE 8 MG: 4 INJECTION, SOLUTION INTRA-ARTICULAR; INTRALESIONAL; INTRAMUSCULAR; INTRAVENOUS; SOFT TISSUE at 08:36

## 2020-12-15 ASSESSMENT — ENCOUNTER SYMPTOMS
CHILLS: 0
SHORTNESS OF BREATH: 1
CONSTIPATION: 0
HEADACHES: 0
COUGH: 0
DIARRHEA: 0
WHEEZING: 0
FEVER: 0
WEIGHT LOSS: 0
DIZZINESS: 1
PALPITATIONS: 1
MYALGIAS: 1
TINGLING: 0
NAUSEA: 1
VOMITING: 0

## 2020-12-15 ASSESSMENT — FIBROSIS 4 INDEX
FIB4 SCORE: 1.46
FIB4 SCORE: 1.6
FIB4 SCORE: 1.46

## 2020-12-15 NOTE — PROGRESS NOTES
Pt presented to [facility] for pre-treatment lab draw. LCW port accessed in sterile fashion. Flushed easily, paolo briskly. CBC, CMP & CEA drawn and sent to lab. Port flushed with NS and Heparin per protocol.  Left accessed for use later this morning.Pt left [facility] in NAD, aware to return at 0800.

## 2020-12-15 NOTE — PROGRESS NOTES
"Pharmacy Chemotherapy Verification Note:    Patient Name: Karlene Walters      Dx: Colon Ca        Protocol: 5-FU +        *Dosing Reference*                                    -- 10/31/17: delete Leucovorin and 5-FU push d/t neutropenia per Dr. Hardy   Fluorouracil 2400 mg/m² continuous infusion over 46-48 hours                              -- 20% reduction (1920 mg/m²) to be continued starting C28 per C Alsop APRN   14 day cycles for 12 cycles (adjuvant) or until disease progression or unacceptable toxicity    NCCN Guidelines for Colon Cancer. V.2.2015.  Jay T, et al. Eur J Cancer.1999;35(9):1343-7.     Allergies:  Oxaliplatin; Codeine; Pcn [penicillins]; Sulfa drugs; and Tape     /69   Pulse 95   Temp 36.4 °C (97.5 °F) (Temporal) Comment: V/S transfered from this morning.  Resp 18   Ht 1.64 m (5' 4.57\") Comment: HT AND W/T transfered from this morning.  Wt 72.8 kg (160 lb 7.9 oz)   LMP  (LMP Unknown)   SpO2 99%   BMI 27.07 kg/m²  Body surface area is 1.82 meters squared.     Labs 12/15//20:  ANC~ 5420 Plt = 289k   Hgb = 12.4  SCr = 0.95 mg/dL CrCl ~ 54 mL/min   AST/ALT/ALK = 30/27/295 TBili = 0.7     Drug Order   (Drug name, dose, route, IV Fluid & volume, frequency, number of doses) Cycle 119 ([place] cycle 111 )     Previous treatment: 12/1/20     Medication = Fluorouracil (5-FU)  Base Dose = 1920 mg/m²  Calc Dose: Base Dose x 1.82 m² =  3494 mg  Final Dose = 3495 mg  Route = CIVI   Fluid & Volume = 69.9 mL (+ 3 mL overfill = 72.9 mL)  Admin Duration = Over 46 hours (to run at 1.5 mL/hour)   Via CADD pump for home infusion       <10% difference, okay to treat with final written dose     By my signature below, I confirm this process was performed independently with the BSA and all final chemotherapy dosing calculations congruent. I have reviewed the above chemotherapy order and that my calculation of the final dose and BSA (when applicable) corroborate those calculations of the  " pharmacist.     Norris Mitchell, PharmD

## 2020-12-15 NOTE — PROGRESS NOTES
Subjective:      Karlene Walters is a 80 y.o. female who presents with metastatic Rectal Cancer.         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  She presents unaccompanied for today's visit.  Patient is scheduled to receive cycle #119 ([facility] #111) single agent 5-FU which she continues on every 2-week interval.     Cancer History  Patient with a long history of rectal carcinoma initially diagnosed in 2012.  She was found to have metastatic disease to liver and lung in 2015.  She is status post liver ablation as well as has received Y 90 in the past.  She was on XELOX initially but had allergic reaction to oxaliplatin (last dose 7/28/15) and was switched to single agent 5-FU. Patient initially on 5-FU at 2400 mg/m2 with Leucovorin and 5-FU push starting on 10/27/15, followed by 20% dose reduction to 1920 mg/m2 for cycle 18 (7/5/16), followed by deletion of Leucovorin and 5-FU push on 10/31/17. In January 2020 patient was changed to every 3-week cycle at cycle #99 ([facility] #91) and then changed to every 4-week interval in May 2020.  It was at that time there was noted to have growth of disease within the liver and was seen by Dr. West, surgeon.  Patient did undergo surgery in hopes of resection of the tumor but it was felt to be too close to the hilum and therefore she did undergo ablation instead on 5/28/20.  She has been back on single agent 5-FU at every 2-week interval, restarted on 6/16/20. Last CT 10/06/20 showed the tumor ablation cavities within the right lobe of the liver have decreased in size since previous examination.  There is a 1.5 x 3.1 cm focal area of enhancement adjacent to the ablation cavity that appears to have increased concerning for possible tumor recurrence.  Based on that finding she was sent for an MRI for further evaluation on 10/8/2020 which confirmed that the appearance of the masses noted in the liver did not have an appearance for  "suspicious finding for recurrent metastatic disease.  Therefore surgeon is requesting repeat MRI in 3 months, scheduled for 1/11/2021.     Interval History  Clinically patient is stable.  She does have continued fatigue but states it is tolerable.  She stated she does not have as much energy as she has had in the past but she is still able to perform ADLs and be active in her home.  She does continue to have shortness of breath which is chronic and has had extensive work-up previously.  She recently saw pulmonology who is planning a 1 year follow-up CT chest in May for continued monitoring.  She does continue to have some nausea with no vomiting intermittently which is resolved with Zofran.  Most recently she has been experiencing generalized myalgias, describing it as an ache in her bilateral thighs and hips, more so on the right hip.  She states that she takes ibuprofen every once in a while with relief after a few hours.  She is also noticing some generalized balance issues, but denies any significant dizziness, lightheadedness, headaches or vision changes.  She also continues to have dryness and cracking to her hands and feet, more so at this time as she recently ran out of cream.    Personally reviewed patient's labs from last week in detail with her today.  Discussed her elevation of her alkaline phosphatase and stable CEA level.    Allergies   Allergen Reactions   • Oxaliplatin Anaphylaxis   • Codeine      \"gets drunk\"   • Pcn [Penicillins] Itching     itching   • Sulfa Drugs Itching     itching   • Tape Rash     PAPER TAPE OK     Current Outpatient Medications on File Prior to Visit   Medication Sig Dispense Refill   • Tiotropium Bromide-Olodaterol (STIOLTO RESPIMAT) 2.5-2.5 MCG/ACT Aero Soln Take 2 Puffs by mouth every day. 2 Each 0   • furosemide (LASIX) 40 MG Tab      • potassium chloride ER (KLOR-CON) 10 MEQ tablet Take 1 Tab by mouth every day. 100 Tab 3   • rivaroxaban (XARELTO) 10 MG Tab tablet Take 1 " Tab by mouth every day. 30 Tab 5   • ondansetron (ZOFRAN) 4 MG Tab tablet Take 1 Tab by mouth every four hours as needed. 30 Tab 5   • lidocaine-prilocaine (EMLA) 2.5-2.5 % Cream Apply to port 1 hour prior to access of port and cover with plastic wrap. 30 g 3   • Tiotropium Bromide-Olodaterol (STIOLTO RESPIMAT) 2.5-2.5 MCG/ACT Aero Soln Take 2 Puffs by mouth every day. 1 Inhaler 11   • albuterol 108 (90 Base) MCG/ACT Aero Soln inhalation aerosol Inhale 2 Puffs by mouth every 6 hours as needed for Shortness of Breath. 8.5 g 11   • metronidazole (METROCREAM) 0.75 % cream Apply 1 Each to affected area(s) 2 times a day.     • cyanocobalamin (VITAMIN B-12) 500 MCG Tab Take 1,000 mcg by mouth every day.     • Multiple Vitamins-Minerals (CENTRUM SILVER PO) Take 1 Tab by mouth every day.     • Cholecalciferol (VITAMIN D3) 400 UNITS CAPS Take 1 Cap by mouth every day.     • loratadine (CLARITIN) 10 MG TABS Take 10 mg by mouth every day. Indications: Hayfever       Current Facility-Administered Medications on File Prior to Visit   Medication Dose Route Frequency Provider Last Rate Last Admin   • fluorouracil (ADRUCIL) 3,495 mg in CADD Cassette/Bag Chemo infusion (for use in CADD PUMP)  1,920 mg/m2 Intravenous Once Ella De, A.P.N.             Review of Systems   Constitutional: Positive for malaise/fatigue (fatigue present - not as much energy but still able to perform ADLs). Negative for chills, fever and weight loss.   Respiratory: Positive for shortness of breath (chronic and worked up completely). Negative for cough and wheezing.    Cardiovascular: Positive for palpitations (intermittent). Negative for chest pain.   Gastrointestinal: Positive for nausea. Negative for constipation, diarrhea and vomiting.   Genitourinary: Negative for dysuria.   Musculoskeletal: Positive for myalgias.   Neurological: Positive for dizziness. Negative for tingling and headaches.          Objective:     /72   Pulse 99   Temp  "36.9 °C (98.5 °F) (Temporal)   Resp 14   Ht 1.64 m (5' 4.57\")   Wt 72.7 kg (160 lb 4.4 oz)   LMP  (LMP Unknown)   SpO2 97%   BMI 27.03 kg/m²        Physical Exam  Vitals signs reviewed.   Constitutional:       General: She is not in acute distress.     Appearance: Normal appearance. She is not diaphoretic.   HENT:      Head: Normocephalic and atraumatic.   Cardiovascular:      Rate and Rhythm: Normal rate and regular rhythm.      Pulses: Normal pulses.      Heart sounds: Normal heart sounds. No murmur. No friction rub. No gallop.    Pulmonary:      Effort: Pulmonary effort is normal. No respiratory distress.      Breath sounds: Normal breath sounds. No wheezing.   Abdominal:      General: Bowel sounds are normal. There is no distension.      Palpations: Abdomen is soft.      Tenderness: There is no abdominal tenderness.   Musculoskeletal: Normal range of motion.         General: No swelling or tenderness.   Skin:     General: Skin is warm and dry.   Neurological:      Mental Status: She is alert and oriented to person, place, and time.   Psychiatric:         Mood and Affect: Mood normal.         Behavior: Behavior normal.         Results for KANG HALL (MRN [id number])   Ref. Range 12/15/2020 07:15   WBC Latest Ref Range: 4.8 - 10.8 K/uL 7.8   RBC Latest Ref Range: 4.20 - 5.40 M/uL 4.35   Hemoglobin Latest Ref Range: 12.0 - 16.0 g/dL 12.4   Hematocrit Latest Ref Range: 37.0 - 47.0 % 39.6   MCV Latest Ref Range: 81.4 - 97.8 fL 91.0   MCH Latest Ref Range: 27.0 - 33.0 pg 28.5   MCHC Latest Ref Range: 33.6 - 35.0 g/dL 31.3 (L)   RDW Latest Ref Range: 35.9 - 50.0 fL 57.7 (H)   Platelet Count Latest Ref Range: 164 - 446 K/uL 289   MPV Latest Ref Range: 9.0 - 12.9 fL 9.8   Neutrophils-Polys Latest Ref Range: 44.00 - 72.00 % 69.70   Neutrophils (Absolute) Latest Ref Range: 2.00 - 7.15 K/uL 5.42   Lymphocytes Latest Ref Range: 22.00 - 41.00 % 15.50 (L)   Lymphs (Absolute) Latest Ref Range: 1.00 - 4.80 K/uL 1.21 "   Monocytes Latest Ref Range: 0.00 - 13.40 % 10.10   Monos (Absolute) Latest Ref Range: 0.00 - 0.85 K/uL 0.79   Eosinophils Latest Ref Range: 0.00 - 6.90 % 3.20   Eos (Absolute) Latest Ref Range: 0.00 - 0.51 K/uL 0.25   Basophils Latest Ref Range: 0.00 - 1.80 % 1.00   Baso (Absolute) Latest Ref Range: 0.00 - 0.12 K/uL 0.08   Immature Granulocytes Latest Ref Range: 0.00 - 0.90 % 0.50   Immature Granulocytes (abs) Latest Ref Range: 0.00 - 0.11 K/uL 0.04   Nucleated RBC Latest Units: /100 WBC 0.00   NRBC (Absolute) Latest Units: K/uL 0.00   Sodium Latest Ref Range: 135 - 145 mmol/L 134 (L)   Potassium Latest Ref Range: 3.6 - 5.5 mmol/L 4.1   Chloride Latest Ref Range: 96 - 112 mmol/L 103   Co2 Latest Ref Range: 20 - 33 mmol/L 21   Anion Gap Latest Ref Range: 7.0 - 16.0  10.0   Glucose Latest Ref Range: 65 - 99 mg/dL 109 (H)   Bun Latest Ref Range: 8 - 22 mg/dL 15   Creatinine Latest Ref Range: 0.50 - 1.40 mg/dL 0.95   GFR If  Latest Ref Range: >60 mL/min/1.73 m 2 >60   GFR If Non  Latest Ref Range: >60 mL/min/1.73 m 2 57 (A)   Calcium Latest Ref Range: 8.5 - 10.5 mg/dL 9.6   AST(SGOT) Latest Ref Range: 12 - 45 U/L 30   ALT(SGPT) Latest Ref Range: 2 - 50 U/L 27   Alkaline Phosphatase Latest Ref Range: 30 - 99 U/L 295 (H)   Total Bilirubin Latest Ref Range: 0.1 - 1.5 mg/dL 0.7   Albumin Latest Ref Range: 3.2 - 4.9 g/dL 3.7   Total Protein Latest Ref Range: 6.0 - 8.2 g/dL 6.9   Globulin Latest Ref Range: 1.9 - 3.5 g/dL 3.2   A-G Ratio Latest Units: g/dL 1.2          Assessment/Plan:        1. Malignant neoplasm of rectum (HCC)     2. Secondary malignant neoplasm of liver (HCC)     3. History of pulmonary embolus (PE)     4. Current use of long term anticoagulation     5. Myalgia     6. Poor balance     7. Encounter for long-term (current) use of high-risk medication       Plan  1. Patient with metastatic rectal cancer to liver currently on single agent 5-FU.  She is scheduled to proceed  with cycle #119 ([facility] plan #111) today.  Clinically patient is stable.  I did review her CBC and CMP and labs are appropriate to proceed with treatment as planned.    2.  Patient currently being monitored closely and scheduled for repeat MRI of the liver on 1/11/2021.  She will be due to follow-up with surgeon post MRI for further evaluation.  I did note at last visit patient's alkaline phosphatase level but continued to trend up, previously 374.  This week her alk phos has trended down to 295.  We will continue to monitor her liver functions closely.    3.  Patient with a history of pulmonary embolism currently on low-dose Xarelto at 10 mg daily and will continue.    4.  Patient recently noticed increased and poor balance at times.  She denies any other neurologic symptoms, and we will continue to monitor this closely.  Encourage patient to increase hydration during those moments of feeling off balance to see if it may be related to dehydration.    5.  Patient also noticing increased generalized myalgias, feeling achiness bilateral thighs as well as hips, more so on the right hip.  She is currently taking ibuprofen with resolution of symptoms and will continue to monitor this.  At this time patient is okay to take ibuprofen as needed with food and water.  Asked patient to continue to monitor, and if pain becomes significantly worse may need further imaging for evaluation.    6.  Patient to continue with treatment every 2 weeks.  She is to follow-up in the clinic in 2 weeks or sooner if needed.

## 2020-12-15 NOTE — PROGRESS NOTES
"Pharmacy Chemotherapy Verification Note:    Patient Name: Karlene Walters      Dx: Colon Ca        Cycle 119 ([facility] cycle 111)     Previous treatment: 12/1/20    Protocol: 5-FU + Leucovorin      followed by     7/5/16: Dose reduced by 20% to 320 mg/m² starting with Cycle 18 ([facility] #11)  due to neutropenia     10/31/17: delete Leucovorin and 5-FU push d/t neutropenia per Dr. Hardy   Fluorouracil continuous infusion over 46-48 hours     7/5/16: Infusion Dose reduced by 20% starting with Cycle 18 ([facility] #11)  due to neutropenia    20% reduction (1920 mg/m²) to be continued starting C28 per C Alsop APRN   14 day cycles for 12 cycles (adjuvant) or until disease progression or unacceptable toxicity  NCCN Guidelines for Colon Cancer. V.2.2015.  Jay GROVER, et al. Eur J Cancer.1999;35(9):1343-7.     Allergies:  Oxaliplatin; Codeine; Pcn [penicillins]; Sulfa drugs; and Tape     /69   Pulse 95   Temp 36.4 °C (97.5 °F) (Temporal) Comment: V/S transfered from this morning.  Resp 18   Ht 1.64 m (5' 4.57\") Comment: HT AND W/T transfered from this morning.  Wt 72.8 kg (160 lb 7.9 oz)   LMP  (LMP Unknown)   SpO2 99%   BMI 27.07 kg/m²  Body surface area is 1.82 meters squared.    Labs from 12/15/20 reviewed - all within treatment parameters.       Fluorouracil (5-FU) 1920 mg/m² x 1.82m² =  3494mg   <10% difference, okay to treat with final dose = 3495mg CIVI over 46 hrs                 Nakia Arroyo, PharmD      "

## 2020-12-15 NOTE — PROGRESS NOTES
Chemotherapy Verification - PRIMARY RN      Height = 164 cm  Weight = 72.8 kg  BSA = 1.82 m^2       Medication: fluorouracil  Dose: 1920 mg/m^2  Calculated Dose: 3494.4 mg over 46 hours                             (In mg/m2, AUC, mg/kg)           I confirm this process was performed independently with the BSA and all final chemotherapy dosing calculations congruent.  Any discrepancies of 10% or greater have been addressed with the chemotherapy pharmacist. The resolution of the discrepancy has been documented in the EPIC progress notes.

## 2020-12-15 NOTE — PROGRESS NOTES
[facility]ene [facility] Day [facility]/ Cycle 111 of 5FU. Pt denied having any new or acute complaints today, reports tolerating past treatments well. Port accessed during prior appointment, had + blood return, flushed briskly. Pt given dexamethasone and attached to 5FU pump as prescribed, tolerated well, denied having any complaints during or after attachment of pump. Clamps left unclamped and CADD pump infusing at this time. Karlene placed pump in konrad pack, states she has no further needs at this time. Next appointment scheduled, Karlene discharged home to self care.

## 2020-12-15 NOTE — PROGRESS NOTES
Chemotherapy Verification - SECONDARY RN       Height = 164 cm  Weight = 72.8 kg  BSA = 1.82 m2       Medication: Home infusion 5FU  Dose: 1920 mg/m2  Calculated Dose: 3496.5 mg over 46 hours                             (In mg/m2, AUC, mg/kg)       I confirm that this process was performed independently.

## 2020-12-16 ENCOUNTER — OUTPATIENT INFUSION SERVICES (OUTPATIENT)
Dept: ONCOLOGY | Facility: MEDICAL CENTER | Age: 80
End: 2020-12-16
Attending: INTERNAL MEDICINE
Payer: MEDICARE

## 2020-12-16 VITALS
RESPIRATION RATE: 18 BRPM | HEART RATE: 102 BPM | TEMPERATURE: 97.1 F | WEIGHT: 164.9 LBS | HEIGHT: 63 IN | BODY MASS INDEX: 29.22 KG/M2 | DIASTOLIC BLOOD PRESSURE: 77 MMHG | OXYGEN SATURATION: 99 % | SYSTOLIC BLOOD PRESSURE: 159 MMHG

## 2020-12-16 DIAGNOSIS — C78.7 SECONDARY MALIGNANT NEOPLASM OF LIVER (HCC): Chronic | ICD-10-CM

## 2020-12-16 PROCEDURE — A4212 NON CORING NEEDLE OR STYLET: HCPCS

## 2020-12-16 PROCEDURE — 96523 IRRIG DRUG DELIVERY DEVICE: CPT

## 2020-12-16 ASSESSMENT — FIBROSIS 4 INDEX: FIB4 SCORE: 1.6

## 2020-12-17 ENCOUNTER — OUTPATIENT INFUSION SERVICES (OUTPATIENT)
Dept: ONCOLOGY | Facility: MEDICAL CENTER | Age: 80
End: 2020-12-17
Attending: INTERNAL MEDICINE
Payer: MEDICARE

## 2020-12-17 VITALS
SYSTOLIC BLOOD PRESSURE: 139 MMHG | HEIGHT: 63 IN | DIASTOLIC BLOOD PRESSURE: 86 MMHG | OXYGEN SATURATION: 98 % | RESPIRATION RATE: 18 BRPM | HEART RATE: 104 BPM | TEMPERATURE: 97.1 F | BODY MASS INDEX: 28.98 KG/M2 | WEIGHT: 163.58 LBS

## 2020-12-17 DIAGNOSIS — C20 MALIGNANT NEOPLASM OF RECTUM (HCC): ICD-10-CM

## 2020-12-17 PROCEDURE — 96523 IRRIG DRUG DELIVERY DEVICE: CPT

## 2020-12-17 PROCEDURE — 700111 HCHG RX REV CODE 636 W/ 250 OVERRIDE (IP): Performed by: NURSE PRACTITIONER

## 2020-12-17 RX ORDER — HEPARIN SODIUM (PORCINE) LOCK FLUSH IV SOLN 100 UNIT/ML 100 UNIT/ML
500 SOLUTION INTRAVENOUS PRN
Status: DISCONTINUED | OUTPATIENT
Start: 2020-12-17 | End: 2020-12-17 | Stop reason: HOSPADM

## 2020-12-17 RX ADMIN — HEPARIN 500 UNITS: 100 SYRINGE at 12:02

## 2020-12-17 ASSESSMENT — FIBROSIS 4 INDEX: FIB4 SCORE: 1.6

## 2020-12-17 NOTE — PROGRESS NOTES
Returns for de access of chemotherapy.  Pump empty, no further hernandez issues.  States knows esophageal pain will start soon so wants to drive home today.  Discussed tips to tx and will try at home.  Port saline and hep lock flush before de access.  DC to self care.

## 2020-12-17 NOTE — PROGRESS NOTES
Late note:  Arrives re to has swelling at port site.  Pump stopped, no blood return noted, saline flush noted increase in swelling.  Amount hard to gauge, approx size of walnut no redness. De accessed, APRN notified and re accessed with good blood return.  Had to use regular non coring needle as was unable to get blood return with low profile.  APRN approves to resume tx, will finish approx at appt time tomorrow.  26.9 ml remaining.  DC to self care. Reviewed skin issues with 5FU

## 2020-12-23 RX ORDER — DEXAMETHASONE SODIUM PHOSPHATE 4 MG/ML
8 INJECTION, SOLUTION INTRA-ARTICULAR; INTRALESIONAL; INTRAMUSCULAR; INTRAVENOUS; SOFT TISSUE ONCE
Status: CANCELLED | OUTPATIENT
Start: 2020-12-29 | End: 2020-12-29

## 2020-12-23 RX ORDER — ONDANSETRON 2 MG/ML
4 INJECTION INTRAMUSCULAR; INTRAVENOUS
Status: CANCELLED | OUTPATIENT
Start: 2020-12-29

## 2020-12-23 RX ORDER — LIDOCAINE HYDROCHLORIDE 10 MG/ML
10 INJECTION, SOLUTION INFILTRATION; PERINEURAL ONCE
Status: CANCELLED | OUTPATIENT
Start: 2020-12-29 | End: 2020-12-29

## 2020-12-23 RX ORDER — HEPARIN SODIUM (PORCINE) LOCK FLUSH IV SOLN 100 UNIT/ML 100 UNIT/ML
500 SOLUTION INTRAVENOUS PRN
Status: CANCELLED | OUTPATIENT
Start: 2020-12-29

## 2020-12-23 RX ORDER — PROCHLORPERAZINE MALEATE 10 MG
10 TABLET ORAL EVERY 6 HOURS PRN
Status: CANCELLED | OUTPATIENT
Start: 2020-12-29

## 2020-12-23 RX ORDER — HEPARIN SODIUM (PORCINE) LOCK FLUSH IV SOLN 100 UNIT/ML 100 UNIT/ML
500 SOLUTION INTRAVENOUS PRN
Status: CANCELLED | OUTPATIENT
Start: 2020-12-31

## 2020-12-23 RX ORDER — DEXTROSE MONOHYDRATE 50 MG/ML
INJECTION, SOLUTION INTRAVENOUS CONTINUOUS
Status: CANCELLED | OUTPATIENT
Start: 2020-12-29

## 2020-12-23 RX ORDER — ONDANSETRON 8 MG/1
8 TABLET, ORALLY DISINTEGRATING ORAL
Status: CANCELLED | OUTPATIENT
Start: 2020-12-29

## 2020-12-28 ENCOUNTER — TELEPHONE (OUTPATIENT)
Dept: ONCOLOGY | Facility: MEDICAL CENTER | Age: 80
End: 2020-12-28

## 2020-12-28 NOTE — TELEPHONE ENCOUNTER
"Pt called back after receiving voicemail.  Karlene asked COVID-19 screening questions and updated check-in process at this time. Karlene states understanding of updated process and is aware to call provided number. Patient answered \"no\" to screening questions. Karlene is ok to proceed with treatment as scheduled for lab draw and chemotherapy on 12/29/2020.  "

## 2020-12-29 ENCOUNTER — OUTPATIENT INFUSION SERVICES (OUTPATIENT)
Dept: ONCOLOGY | Facility: MEDICAL CENTER | Age: 80
End: 2020-12-29
Attending: INTERNAL MEDICINE
Payer: MEDICARE

## 2020-12-29 ENCOUNTER — OFFICE VISIT (OUTPATIENT)
Dept: HEMATOLOGY ONCOLOGY | Facility: MEDICAL CENTER | Age: 80
End: 2020-12-29
Payer: MEDICARE

## 2020-12-29 VITALS
HEIGHT: 63 IN | HEART RATE: 56 BPM | OXYGEN SATURATION: 99 % | RESPIRATION RATE: 18 BRPM | DIASTOLIC BLOOD PRESSURE: 71 MMHG | WEIGHT: 159.61 LBS | SYSTOLIC BLOOD PRESSURE: 144 MMHG | BODY MASS INDEX: 28.28 KG/M2 | TEMPERATURE: 97.2 F

## 2020-12-29 VITALS
TEMPERATURE: 98 F | SYSTOLIC BLOOD PRESSURE: 118 MMHG | BODY MASS INDEX: 28.3 KG/M2 | HEART RATE: 108 BPM | RESPIRATION RATE: 18 BRPM | WEIGHT: 159.72 LBS | HEIGHT: 63 IN | OXYGEN SATURATION: 99 % | DIASTOLIC BLOOD PRESSURE: 84 MMHG

## 2020-12-29 VITALS
SYSTOLIC BLOOD PRESSURE: 144 MMHG | DIASTOLIC BLOOD PRESSURE: 71 MMHG | HEART RATE: 56 BPM | OXYGEN SATURATION: 99 % | WEIGHT: 159.61 LBS | RESPIRATION RATE: 18 BRPM | HEIGHT: 63 IN | BODY MASS INDEX: 28.28 KG/M2 | TEMPERATURE: 97.2 F

## 2020-12-29 DIAGNOSIS — Z79.01 CURRENT USE OF LONG TERM ANTICOAGULATION: ICD-10-CM

## 2020-12-29 DIAGNOSIS — Z86.711 HISTORY OF PULMONARY EMBOLUS (PE): ICD-10-CM

## 2020-12-29 DIAGNOSIS — C20 MALIGNANT NEOPLASM OF RECTUM (HCC): ICD-10-CM

## 2020-12-29 DIAGNOSIS — T45.1X5A CHEMOTHERAPY-INDUCED NAUSEA: ICD-10-CM

## 2020-12-29 DIAGNOSIS — R11.0 CHEMOTHERAPY-INDUCED NAUSEA: ICD-10-CM

## 2020-12-29 DIAGNOSIS — C78.7 SECONDARY MALIGNANT NEOPLASM OF LIVER (HCC): ICD-10-CM

## 2020-12-29 DIAGNOSIS — Z79.899 ENCOUNTER FOR LONG-TERM (CURRENT) USE OF HIGH-RISK MEDICATION: ICD-10-CM

## 2020-12-29 LAB
ALBUMIN SERPL BCP-MCNC: 3.6 G/DL (ref 3.2–4.9)
ALBUMIN/GLOB SERPL: 1.1 G/DL
ALP SERPL-CCNC: 231 U/L (ref 30–99)
ALT SERPL-CCNC: 20 U/L (ref 2–50)
ANION GAP SERPL CALC-SCNC: 9 MMOL/L (ref 7–16)
AST SERPL-CCNC: 20 U/L (ref 12–45)
BASOPHILS # BLD AUTO: 0.8 % (ref 0–1.8)
BASOPHILS # BLD: 0.07 K/UL (ref 0–0.12)
BILIRUB SERPL-MCNC: 0.4 MG/DL (ref 0.1–1.5)
BUN SERPL-MCNC: 14 MG/DL (ref 8–22)
CALCIUM SERPL-MCNC: 9.4 MG/DL (ref 8.5–10.5)
CEA SERPL-MCNC: 1.7 NG/ML (ref 0–3)
CHLORIDE SERPL-SCNC: 105 MMOL/L (ref 96–112)
CO2 SERPL-SCNC: 20 MMOL/L (ref 20–33)
CREAT SERPL-MCNC: 1.04 MG/DL (ref 0.5–1.4)
EOSINOPHIL # BLD AUTO: 0.36 K/UL (ref 0–0.51)
EOSINOPHIL NFR BLD: 3.9 % (ref 0–6.9)
ERYTHROCYTE [DISTWIDTH] IN BLOOD BY AUTOMATED COUNT: 57.4 FL (ref 35.9–50)
GLOBULIN SER CALC-MCNC: 3.3 G/DL (ref 1.9–3.5)
GLUCOSE SERPL-MCNC: 116 MG/DL (ref 65–99)
HCT VFR BLD AUTO: 40.4 % (ref 37–47)
HGB BLD-MCNC: 12.8 G/DL (ref 12–16)
IMM GRANULOCYTES # BLD AUTO: 0.07 K/UL (ref 0–0.11)
IMM GRANULOCYTES NFR BLD AUTO: 0.8 % (ref 0–0.9)
LYMPHOCYTES # BLD AUTO: 1.38 K/UL (ref 1–4.8)
LYMPHOCYTES NFR BLD: 15.1 % (ref 22–41)
MCH RBC QN AUTO: 28.8 PG (ref 27–33)
MCHC RBC AUTO-ENTMCNC: 31.7 G/DL (ref 33.6–35)
MCV RBC AUTO: 90.8 FL (ref 81.4–97.8)
MONOCYTES # BLD AUTO: 1.09 K/UL (ref 0–0.85)
MONOCYTES NFR BLD AUTO: 11.9 % (ref 0–13.4)
NEUTROPHILS # BLD AUTO: 6.18 K/UL (ref 2–7.15)
NEUTROPHILS NFR BLD: 67.5 % (ref 44–72)
NRBC # BLD AUTO: 0 K/UL
NRBC BLD-RTO: 0 /100 WBC
PLATELET # BLD AUTO: 284 K/UL (ref 164–446)
PMV BLD AUTO: 9.8 FL (ref 9–12.9)
POTASSIUM SERPL-SCNC: 4.3 MMOL/L (ref 3.6–5.5)
PROT SERPL-MCNC: 6.9 G/DL (ref 6–8.2)
RBC # BLD AUTO: 4.45 M/UL (ref 4.2–5.4)
SODIUM SERPL-SCNC: 134 MMOL/L (ref 135–145)
WBC # BLD AUTO: 9.2 K/UL (ref 4.8–10.8)

## 2020-12-29 PROCEDURE — 80053 COMPREHEN METABOLIC PANEL: CPT

## 2020-12-29 PROCEDURE — 96374 THER/PROPH/DIAG INJ IV PUSH: CPT | Mod: XU

## 2020-12-29 PROCEDURE — 96375 TX/PRO/DX INJ NEW DRUG ADDON: CPT

## 2020-12-29 PROCEDURE — 85025 COMPLETE CBC W/AUTO DIFF WBC: CPT

## 2020-12-29 PROCEDURE — A4212 NON CORING NEEDLE OR STYLET: HCPCS

## 2020-12-29 PROCEDURE — G0498 CHEMO EXTEND IV INFUS W/PUMP: HCPCS

## 2020-12-29 PROCEDURE — 82378 CARCINOEMBRYONIC ANTIGEN: CPT

## 2020-12-29 PROCEDURE — 99214 OFFICE O/P EST MOD 30 MIN: CPT | Performed by: NURSE PRACTITIONER

## 2020-12-29 PROCEDURE — 700111 HCHG RX REV CODE 636 W/ 250 OVERRIDE (IP): Performed by: NURSE PRACTITIONER

## 2020-12-29 PROCEDURE — 700111 HCHG RX REV CODE 636 W/ 250 OVERRIDE (IP)

## 2020-12-29 PROCEDURE — 96409 CHEMO IV PUSH SNGL DRUG: CPT

## 2020-12-29 RX ORDER — DEXAMETHASONE SODIUM PHOSPHATE 4 MG/ML
8 INJECTION, SOLUTION INTRA-ARTICULAR; INTRALESIONAL; INTRAMUSCULAR; INTRAVENOUS; SOFT TISSUE ONCE
Status: COMPLETED | OUTPATIENT
Start: 2020-12-29 | End: 2020-12-29

## 2020-12-29 RX ORDER — HEPARIN SODIUM (PORCINE) LOCK FLUSH IV SOLN 100 UNIT/ML 100 UNIT/ML
SOLUTION INTRAVENOUS
Status: COMPLETED
Start: 2020-12-29 | End: 2020-12-29

## 2020-12-29 RX ADMIN — FLUOROURACIL 3455 MG: 50 INJECTION, SOLUTION INTRAVENOUS at 09:00

## 2020-12-29 RX ADMIN — DEXAMETHASONE SODIUM PHOSPHATE 8 MG: 4 INJECTION, SOLUTION INTRA-ARTICULAR; INTRALESIONAL; INTRAMUSCULAR; INTRAVENOUS; SOFT TISSUE at 08:36

## 2020-12-29 RX ADMIN — HEPARIN 5 ML: 100 SYRINGE at 07:20

## 2020-12-29 ASSESSMENT — ENCOUNTER SYMPTOMS
NAUSEA: 1
CHILLS: 0
ABDOMINAL PAIN: 0
EYE REDNESS: 1
DIARRHEA: 0
TINGLING: 1
DIZZINESS: 0
VOMITING: 0
PALPITATIONS: 0
INSOMNIA: 0
CONSTIPATION: 0
FEVER: 0
WEIGHT LOSS: 1
SHORTNESS OF BREATH: 1
MYALGIAS: 0
HEADACHES: 0

## 2020-12-29 ASSESSMENT — FIBROSIS 4 INDEX
FIB4 SCORE: 1.63
FIB4 SCORE: 1.6
FIB4 SCORE: 1.6

## 2020-12-29 ASSESSMENT — PAIN SCALES - GENERAL: PAINLEVEL: NO PAIN

## 2020-12-29 NOTE — PROGRESS NOTES
Subjective:      Karlene Walters is a 80 y.o. female who presents for pre-chemo for metastatic colon cancer to liver.              Patient seen today in follow up for metastatic colon cancer to liver. She presents unaccompanied for today's visit.  Patient is scheduled to receive cycle #120 ([facility] #112) single agent 5-FU which she continues on every 2-week interval.     Cancer History  Patient with a long history of rectal carcinoma initially diagnosed in 2012.  She was found to have metastatic disease to liver and lung in 2015.  She is status post liver ablation as well as has received Y 90 in the past.  She was on XELOX initially but had allergic reaction to oxaliplatin (last dose 7/28/15) and was switched to single agent 5-FU. Patient initially on 5-FU at 2400 mg/m2 with Leucovorin and 5-FU push starting on 10/27/15, followed by 20% dose reduction to 1920 mg/m2 for cycle 18 (7/5/16), followed by deletion of Leucovorin and 5-FU push on 10/31/17. In January 2020 patient was changed to every 3-week cycle at cycle #99 ([facility] #91) and then changed to every 4-week interval in May 2020.  It was at that time there was noted to have growth of disease within the liver and was seen by Dr. West, surgeon.  Patient did undergo surgery in hopes of resection of the tumor but it was felt to be too close to the hilum and therefore she did undergo ablation instead on 5/28/20.  She has been back on single agent 5-FU at every 2-week interval, restarted on 6/16/20. Last CT 10/06/20 showed the tumor ablation cavities within the right lobe of the liver have decreased in size since previous examination.  There is a 1.5 x 3.1 cm focal area of enhancement adjacent to the ablation cavity that appears to have increased concerning for possible tumor recurrence.  Based on that finding she was sent for an MRI for further evaluation on 10/8/2020 which confirmed that the appearance of the masses noted in the liver did not have an  "appearance for suspicious finding for recurrent metastatic disease.  Therefore surgeon is requesting repeat MRI in 3 months, scheduled for 1/11/2021.    Interval History  Clinically patient is very stable.  She does have continued fatigue and did experience approximately 4 pound weight loss within the last 2 weeks.  She stated she has been eating mostly desserts over the Christmas holiday.  She does note slight decrease in her appetite over the last week.  She has some shortness of breath with activity as well as due to the cold air, but it does resolve after rest.  This is been chronic and had significant work-up.  She denies any chest pain, heart palpitations or swelling her legs.  She does take Lasix as needed, last 1 was approximately 1 week ago.  She does have chemotherapy-induced nausea but that is resolved with Zofran as needed.  She stated the nausea has slightly increased so she is being more regimented with her Zofran use.  She denies any abdominal pain.  Her output via her ostomy is stable.  She does continue to have edema, cracking and peeling of her hands and feet from her chemotherapy.  She has some very mild peripheral neuropathy in the balls of her feet as well.  She does feel off balance at times, some days are worse than others.  No significant dizziness or headaches.  Patient has noticed her right eye with a blood vessel with some increased watery drainage.  She has been monitoring this and using drops as needed.    Last cycle patient did have her port needle come out during the infusion.  She was seen in the infusion center immediately and that was fixed.  She stated she had some redness to the skin but that has completely resolved at this time.    Allergies   Allergen Reactions   • Oxaliplatin Anaphylaxis   • Codeine      \"gets drunk\"   • Pcn [Penicillins] Itching     itching   • Sulfa Drugs Itching     itching   • Tape Rash     PAPER TAPE OK     Current Outpatient Medications on File Prior to " Visit   Medication Sig Dispense Refill   • Tiotropium Bromide-Olodaterol (STIOLTO RESPIMAT) 2.5-2.5 MCG/ACT Aero Soln Take 2 Puffs by mouth every day. 2 Each 0   • furosemide (LASIX) 40 MG Tab      • potassium chloride ER (KLOR-CON) 10 MEQ tablet Take 1 Tab by mouth every day. 100 Tab 3   • rivaroxaban (XARELTO) 10 MG Tab tablet Take 1 Tab by mouth every day. 30 Tab 5   • ondansetron (ZOFRAN) 4 MG Tab tablet Take 1 Tab by mouth every four hours as needed. 30 Tab 5   • lidocaine-prilocaine (EMLA) 2.5-2.5 % Cream Apply to port 1 hour prior to access of port and cover with plastic wrap. 30 g 3   • Tiotropium Bromide-Olodaterol (STIOLTO RESPIMAT) 2.5-2.5 MCG/ACT Aero Soln Take 2 Puffs by mouth every day. 1 Inhaler 11   • albuterol 108 (90 Base) MCG/ACT Aero Soln inhalation aerosol Inhale 2 Puffs by mouth every 6 hours as needed for Shortness of Breath. 8.5 g 11   • metronidazole (METROCREAM) 0.75 % cream Apply 1 Each to affected area(s) 2 times a day.     • Multiple Vitamins-Minerals (CENTRUM SILVER PO) Take 1 Tab by mouth every day.     • Cholecalciferol (VITAMIN D3) 400 UNITS CAPS Take 1 Cap by mouth every day.     • loratadine (CLARITIN) 10 MG TABS Take 10 mg by mouth every day. Indications: Hayfever     • cyanocobalamin (VITAMIN B-12) 500 MCG Tab Take 1,000 mcg by mouth every day.       Current Facility-Administered Medications on File Prior to Visit   Medication Dose Route Frequency Provider Last Rate Last Admin   • fluorouracil (ADRUCIL) 3,455 mg in CADD Cassette/Bag Chemo infusion (for use in CADD PUMP)  1,920 mg/m2 Intravenous Once Ella De, A.P.N.           Review of Systems   Constitutional: Positive for malaise/fatigue and weight loss. Negative for chills and fever.   Eyes: Positive for redness (right eye).   Respiratory: Positive for shortness of breath (with activity as well as from the cold air).    Cardiovascular: Negative for chest pain, palpitations and leg swelling (takes Lasix as needed).  "  Gastrointestinal: Positive for nausea. Negative for abdominal pain, constipation, diarrhea and vomiting.   Genitourinary: Negative for dysuria.   Musculoskeletal: Negative for myalgias.   Skin: Negative for itching and rash.        Erythema and cracking and peeling of hands and feet   Neurological: Positive for tingling (mild in the feet - on the balls of her feet). Negative for dizziness (off balance at times still - some days are worse than others) and headaches.   Psychiatric/Behavioral: The patient does not have insomnia.           Objective:     /84 (BP Location: Right arm, Patient Position: Sitting, BP Cuff Size: Adult)   Pulse (!) 108   Temp 36.7 °C (98 °F) (Temporal)   Resp 18   Ht 1.61 m (5' 3.39\")   Wt 72.4 kg (159 lb 11.6 oz)   LMP  (LMP Unknown)   SpO2 99%   BMI 27.95 kg/m²        Physical Exam  Vitals signs reviewed.   Constitutional:       General: She is not in acute distress.     Appearance: Normal appearance. She is not diaphoretic.   HENT:      Head: Normocephalic and atraumatic.   Eyes:      General: No scleral icterus.        Right eye: Discharge (watery discharged with mild redness noted) present.         Left eye: No discharge.      Extraocular Movements: Extraocular movements intact.      Conjunctiva/sclera: Conjunctivae normal.      Pupils: Pupils are equal, round, and reactive to light.   Cardiovascular:      Rate and Rhythm: Regular rhythm. Tachycardia present.      Pulses: Normal pulses.      Heart sounds: Normal heart sounds. No murmur. No friction rub. No gallop.    Pulmonary:      Effort: No respiratory distress.      Breath sounds: Normal breath sounds. No wheezing.      Comments: Shortness of breath with activity - resolved with rest  Abdominal:      General: Bowel sounds are normal. There is no distension.      Palpations: Abdomen is soft.      Tenderness: There is no abdominal tenderness.   Musculoskeletal: Normal range of motion.         General: No swelling or " tenderness.   Skin:     General: Skin is warm.      Coloration: Skin is not jaundiced or pale.      Findings: Erythema (hands with dryness and cracking) present.   Neurological:      Mental Status: She is alert and oriented to person, place, and time.   Psychiatric:         Mood and Affect: Mood normal.         Behavior: Behavior normal.         Results for KANG HALL (MRN [id number])    Ref. Range 12/29/2020 07:15   WBC Latest Ref Range: 4.8 - 10.8 K/uL 9.2   RBC Latest Ref Range: 4.20 - 5.40 M/uL 4.45   Hemoglobin Latest Ref Range: 12.0 - 16.0 g/dL 12.8   Hematocrit Latest Ref Range: 37.0 - 47.0 % 40.4   MCV Latest Ref Range: 81.4 - 97.8 fL 90.8   MCH Latest Ref Range: 27.0 - 33.0 pg 28.8   MCHC Latest Ref Range: 33.6 - 35.0 g/dL 31.7 (L)   RDW Latest Ref Range: 35.9 - 50.0 fL 57.4 (H)   Platelet Count Latest Ref Range: 164 - 446 K/uL 284   MPV Latest Ref Range: 9.0 - 12.9 fL 9.8   Neutrophils-Polys Latest Ref Range: 44.00 - 72.00 % 67.50   Neutrophils (Absolute) Latest Ref Range: 2.00 - 7.15 K/uL 6.18   Lymphocytes Latest Ref Range: 22.00 - 41.00 % 15.10 (L)   Lymphs (Absolute) Latest Ref Range: 1.00 - 4.80 K/uL 1.38   Monocytes Latest Ref Range: 0.00 - 13.40 % 11.90   Monos (Absolute) Latest Ref Range: 0.00 - 0.85 K/uL 1.09 (H)   Eosinophils Latest Ref Range: 0.00 - 6.90 % 3.90   Eos (Absolute) Latest Ref Range: 0.00 - 0.51 K/uL 0.36   Basophils Latest Ref Range: 0.00 - 1.80 % 0.80   Baso (Absolute) Latest Ref Range: 0.00 - 0.12 K/uL 0.07   Immature Granulocytes Latest Ref Range: 0.00 - 0.90 % 0.80   Immature Granulocytes (abs) Latest Ref Range: 0.00 - 0.11 K/uL 0.07   Nucleated RBC Latest Units: /100 WBC 0.00   NRBC (Absolute) Latest Units: K/uL 0.00   Sodium Latest Ref Range: 135 - 145 mmol/L 134 (L)   Potassium Latest Ref Range: 3.6 - 5.5 mmol/L 4.3   Chloride Latest Ref Range: 96 - 112 mmol/L 105   Co2 Latest Ref Range: 20 - 33 mmol/L 20   Anion Gap Latest Ref Range: 7.0 - 16.0  9.0   Glucose Latest  Ref Range: 65 - 99 mg/dL 116 (H)   Bun Latest Ref Range: 8 - 22 mg/dL 14   Creatinine Latest Ref Range: 0.50 - 1.40 mg/dL 1.04   GFR If  Latest Ref Range: >60 mL/min/1.73 m 2 >60   GFR If Non  Latest Ref Range: >60 mL/min/1.73 m 2 51 (A)   Calcium Latest Ref Range: 8.5 - 10.5 mg/dL 9.4   AST(SGOT) Latest Ref Range: 12 - 45 U/L 20   ALT(SGPT) Latest Ref Range: 2 - 50 U/L 20   Alkaline Phosphatase Latest Ref Range: 30 - 99 U/L 231 (H)   Total Bilirubin Latest Ref Range: 0.1 - 1.5 mg/dL 0.4   Albumin Latest Ref Range: 3.2 - 4.9 g/dL 3.6   Total Protein Latest Ref Range: 6.0 - 8.2 g/dL 6.9   Globulin Latest Ref Range: 1.9 - 3.5 g/dL 3.3   A-G Ratio Latest Units: g/dL 1.1   Carcinoembryonic Antigen Latest Ref Range: 0.0 - 3.0 ng/mL 1.7          Assessment/Plan:        1. Malignant neoplasm of rectum (HCC)     2. Secondary malignant neoplasm of liver (HCC)     3. History of pulmonary embolus (PE)     4. Current use of long term anticoagulation     5. Chemotherapy-induced nausea     6. Encounter for long-term (current) use of high-risk medication       1.  Patient with metastatic colorectal cancer to liver currently on single agent 5-FU.  She is scheduled to proceed with treatment today.  I did review her CBC, CMP and CEA and labs are appropriate to proceed as planned.  She did have slightly elevated alkaline phosphatase approximately 1 month ago, however her alk phos has decreased down to stable # of 231.  CEA is 1.7 today.    Plan for a 3-month follow-up MRI of the liver which is scheduled for 1/11/2021.  She is due to follow-up with Dr. Steele following the MRI as well as her surgeon the next day.  Patient is eager for the MRI results.    2.  Patient with a history of pulmonary embolism currently on a low-dose Xarelto at 10 mg daily and will continue she is tolerating it well.    3.  Patient with chemotherapy-induced nausea which is slightly worsening.  However she is using Zofran with  positive results.  She is to continue on Zofran and can consider an additional antiemetic if needed in the future.    4.  Patient will continue on treatment as planned with single agent 5-FU every 2 weeks.  She is due to follow-up again in 2 weeks or sooner if needed with Dr. Steele following her MRI of the liver.

## 2020-12-29 NOTE — PROGRESS NOTES
Chemotherapy Verification - SECONDARY RN       Height = 63.39in  Weight = 72.4kg  BSA = 1.80m2       Medication: Fluorouracil  Dose: 1920mg/m2  (80% of original dose of 2400mg/m2) Calculated Dose: 3456mg                             (In mg/m2, AUC, mg/kg)         I confirm that this process was performed independently.

## 2020-12-29 NOTE — PROGRESS NOTES
Chemotherapy Verification - PRIMARY RN      Height = 161 cm  Weight = 72.4 kg  BSA = 1.8 m^2         Medication: Fluorouracil CADD pump  Dose: 1,920mg/m2  Calculated Dose: 3,456 mg infused over 46 hours via CADD pump                             (In mg/m2, AUC, mg/kg)     I confirm this process was performed independently with the BSA and all final chemotherapy dosing calculations congruent.  Any discrepancies of 10% or greater have been addressed with the chemotherapy pharmacist. The resolution of the discrepancy has been documented in the EPIC progress notes.

## 2020-12-29 NOTE — PROGRESS NOTES
"Pharmacy Chemotherapy Verification Note:    Patient Name: Karlene Walters      Dx: Colon Ca        Protocol: 5-FU +        *Dosing Reference*                                    -- 10/31/17: delete Leucovorin and 5-FU push d/t neutropenia per Dr. Hardy   Fluorouracil 2400 mg/m² continuous infusion over 46-48 hours                              -- 20% reduction (1920 mg/m²) to be continued starting C28 per C Alsop APRN   14 day cycles for 12 cycles (adjuvant) or until disease progression or unacceptable toxicity    NCCN Guidelines for Colon Cancer. V.2.2015.  Jay T, et al. Eur J Cancer.1999;35(9):1343-7.     Allergies:  Oxaliplatin; Codeine; Pcn [penicillins]; Sulfa drugs; and Tape     /71   Pulse (!) 56 Comment: Transfered over from this morning.  Temp 36.2 °C (97.2 °F) (Temporal) Comment: Transfered over from this morning. Comment (Src): Transfered over from this morning.  Resp 18 Comment: Transfered over from this morning.  Ht 1.61 m (5' 3.39\") Comment: Transfered over from this morning.  Wt 72.4 kg (159 lb 9.8 oz) Comment: Transfered over from this morning.  LMP  (LMP Unknown)   SpO2 99% Comment: Transfered over from this morning.  BMI 27.93 kg/m²  Body surface area is 1.8 meters squared.     Labs 12/29/20:  ANC~ 6180 Plt = 284k   Hgb = 12.8     SCr = 1.04 mg/dL CrCl ~ 49 mL/min   AST/ALT/ALK = 20/20/231 TBili = 0.4     Drug Order   (Drug name, dose, route, IV Fluid & volume, frequency, number of doses) Cycle 120 ([place] cycle 112)     Previous treatment: 12/15/20     Medication = Fluorouracil (5-FU)  Base Dose = 1920 mg/m²  Calc Dose: Base Dose x 1.8 m² =  3456 mg  Final Dose = 3455 mg  Route = CIVI   Fluid & Volume = 69.1 mL (+ 3 mL overfill = 72.1 mL)  Admin Duration = Over 46 hours (to run at 1.5 mL/hour)   Via CADD pump for home infusion       <10% difference, okay to treat with final written dose     By my signature below, I confirm this process was performed independently with the BSA and " all final chemotherapy dosing calculations congruent. I have reviewed the above chemotherapy order and that my calculation of the final dose and BSA (when applicable) corroborate those calculations of the  pharmacist.     Asha Krishnan, PharmD

## 2020-12-29 NOTE — PROGRESS NOTES
Patient presents for lab draw from port, prior to chemotherapy later this morning. Upper right chest port accessed with good blood return. Port flushed per protocol and left in place. Patient returns at 0800 and released in no acute distress.

## 2020-12-29 NOTE — PROGRESS NOTES
"Pharmacy Chemotherapy Verification Note:    Patient Name: Karlene Walters      Dx: Colon Ca        Cycle 120 ([place] cycle 112)     Previous treatment: 12/15/20    Protocol: 5-FU + Leucovorin      followed by     7/5/16: Dose reduced by 20% to 320 mg/m² starting with Cycle 18 ([place] #11)  due to neutropenia     10/31/17: delete Leucovorin and 5-FU push d/t neutropenia per Dr. Hardy   Fluorouracil continuous infusion over 46-48 hours     7/5/16: Infusion Dose reduced by 20% starting with Cycle 18 ([place] #11)  due to neutropenia    20% reduction (1920 mg/m²) to be continued starting C28 per C Alsop APRN   14 day cycles for 12 cycles (adjuvant) or until disease progression or unacceptable toxicity  NCCN Guidelines for Colon Cancer. V.2.2015.  Jay GROVER, et al. Eur J Cancer.1999;35(9):1343-7.     Allergies:  Oxaliplatin; Codeine; Pcn [penicillins]; Sulfa drugs; and Tape     /71   Pulse (!) 56 Comment: Transfered over from this morning.  Temp 36.2 °C (97.2 °F) (Temporal) Comment: Transfered over from this morning. Comment (Src): Transfered over from this morning.  Resp 18 Comment: Transfered over from this morning.  Ht 1.61 m (5' 3.39\") Comment: Transfered over from this morning.  Wt 72.4 kg (159 lb 9.8 oz) Comment: Transfered over from this morning.  LMP  (LMP Unknown)   SpO2 99% Comment: Transfered over from this morning.  BMI 27.93 kg/m²  Body surface area is 1.8 meters squared.    Labs: 12/29/20  Meet treatment parameters    Fluorouracil (5-FU) 1920 mg/m² x 1.8 m² =  3456 mg   Okay to treat with final dose = 3455 mg CIVI over 46 hrs                   "

## 2020-12-29 NOTE — PROGRESS NOTES
Pt returns to [facility] for 5FU CADD pump.  Pt was seen by ANDREW Krishnan this morning.  Pt denies s/sx of infection.  Right eye noted to reddened.  Pt reports dry eyes and was rubbing her eye.  Pt uses Refresh eye drops.  Labs drawn this morning reviewed and pt meets parameters for treatment.  NATALI port is accessed.  Port flushed with NS and brisk blood return observed.  Zofran IVP given as pre-medication.  Port flushed with NS and is positive for blood return prior to connecting 5FU CADD pump.  5FU CADD pump connected to pt.  All clamps are open and pump is functioning properly.  Confirmed next appt time with pt.  Pt dc home to self care.

## 2020-12-31 ENCOUNTER — OUTPATIENT INFUSION SERVICES (OUTPATIENT)
Dept: ONCOLOGY | Facility: MEDICAL CENTER | Age: 80
End: 2020-12-31
Attending: INTERNAL MEDICINE
Payer: MEDICARE

## 2020-12-31 VITALS
HEIGHT: 63 IN | HEART RATE: 113 BPM | RESPIRATION RATE: 18 BRPM | WEIGHT: 158.73 LBS | DIASTOLIC BLOOD PRESSURE: 80 MMHG | BODY MASS INDEX: 28.12 KG/M2 | SYSTOLIC BLOOD PRESSURE: 147 MMHG | OXYGEN SATURATION: 99 % | TEMPERATURE: 97.8 F

## 2020-12-31 DIAGNOSIS — C20 MALIGNANT NEOPLASM OF RECTUM (HCC): ICD-10-CM

## 2020-12-31 PROCEDURE — 96523 IRRIG DRUG DELIVERY DEVICE: CPT

## 2020-12-31 PROCEDURE — 700111 HCHG RX REV CODE 636 W/ 250 OVERRIDE (IP): Performed by: NURSE PRACTITIONER

## 2020-12-31 RX ORDER — HEPARIN SODIUM (PORCINE) LOCK FLUSH IV SOLN 100 UNIT/ML 100 UNIT/ML
500 SOLUTION INTRAVENOUS PRN
Status: DISCONTINUED | OUTPATIENT
Start: 2020-12-31 | End: 2020-12-31 | Stop reason: HOSPADM

## 2020-12-31 RX ADMIN — HEPARIN 500 UNITS: 100 SYRINGE at 11:50

## 2020-12-31 ASSESSMENT — FIBROSIS 4 INDEX: FIB4 SCORE: 1.26

## 2020-12-31 NOTE — PROGRESS NOTES
Pt presented to [facility] for 46 hour Fluorouracil CADD pump disconnection.  Pt stated the pump alarmed completed at 10:30.  Volume reading 0.0 ml remaining.  Bag in cartridge appeared empty. Pump disconnected. Port flushed with NS and Heparin per policy, then de-accessed. Meade needle tip remained intact. Gauze dressing to site. Pt left [facility] in NAD. Next appointment time confirmed prior to departure.

## 2021-01-06 RX ORDER — ONDANSETRON 2 MG/ML
4 INJECTION INTRAMUSCULAR; INTRAVENOUS
Status: CANCELLED | OUTPATIENT
Start: 2021-01-12

## 2021-01-06 RX ORDER — HEPARIN SODIUM (PORCINE) LOCK FLUSH IV SOLN 100 UNIT/ML 100 UNIT/ML
500 SOLUTION INTRAVENOUS PRN
Status: CANCELLED | OUTPATIENT
Start: 2021-01-06

## 2021-01-06 RX ORDER — HEPARIN SODIUM (PORCINE) LOCK FLUSH IV SOLN 100 UNIT/ML 100 UNIT/ML
500 SOLUTION INTRAVENOUS PRN
Status: CANCELLED | OUTPATIENT
Start: 2021-01-12

## 2021-01-06 RX ORDER — PROCHLORPERAZINE MALEATE 10 MG
10 TABLET ORAL EVERY 6 HOURS PRN
Status: CANCELLED | OUTPATIENT
Start: 2021-01-12

## 2021-01-06 RX ORDER — LIDOCAINE HYDROCHLORIDE 10 MG/ML
10 INJECTION, SOLUTION INFILTRATION; PERINEURAL ONCE
Status: CANCELLED | OUTPATIENT
Start: 2021-01-12 | End: 2021-01-12

## 2021-01-06 RX ORDER — DEXTROSE MONOHYDRATE 50 MG/ML
INJECTION, SOLUTION INTRAVENOUS CONTINUOUS
Status: CANCELLED | OUTPATIENT
Start: 2021-01-12

## 2021-01-06 RX ORDER — ONDANSETRON 8 MG/1
8 TABLET, ORALLY DISINTEGRATING ORAL
Status: CANCELLED | OUTPATIENT
Start: 2021-01-12

## 2021-01-06 RX ORDER — 0.9 % SODIUM CHLORIDE 0.9 %
20 VIAL (ML) INJECTION PRN
Status: CANCELLED | OUTPATIENT
Start: 2021-01-06

## 2021-01-06 RX ORDER — DEXAMETHASONE SODIUM PHOSPHATE 4 MG/ML
8 INJECTION, SOLUTION INTRA-ARTICULAR; INTRALESIONAL; INTRAMUSCULAR; INTRAVENOUS; SOFT TISSUE ONCE
Status: CANCELLED | OUTPATIENT
Start: 2021-01-12 | End: 2021-01-12

## 2021-01-11 ENCOUNTER — HOSPITAL ENCOUNTER (OUTPATIENT)
Dept: RADIOLOGY | Facility: MEDICAL CENTER | Age: 81
End: 2021-01-11
Attending: SURGERY
Payer: MEDICARE

## 2021-01-11 ENCOUNTER — OFFICE VISIT (OUTPATIENT)
Dept: HEMATOLOGY ONCOLOGY | Facility: MEDICAL CENTER | Age: 81
End: 2021-01-11
Payer: MEDICARE

## 2021-01-11 ENCOUNTER — OUTPATIENT INFUSION SERVICES (OUTPATIENT)
Dept: ONCOLOGY | Facility: MEDICAL CENTER | Age: 81
End: 2021-01-11
Attending: INTERNAL MEDICINE
Payer: MEDICARE

## 2021-01-11 VITALS
HEIGHT: 64 IN | DIASTOLIC BLOOD PRESSURE: 80 MMHG | SYSTOLIC BLOOD PRESSURE: 126 MMHG | OXYGEN SATURATION: 93 % | WEIGHT: 160.05 LBS | BODY MASS INDEX: 27.33 KG/M2 | RESPIRATION RATE: 16 BRPM | TEMPERATURE: 97.5 F | HEART RATE: 60 BPM

## 2021-01-11 VITALS
RESPIRATION RATE: 18 BRPM | OXYGEN SATURATION: 100 % | SYSTOLIC BLOOD PRESSURE: 139 MMHG | HEART RATE: 109 BPM | DIASTOLIC BLOOD PRESSURE: 64 MMHG | BODY MASS INDEX: 26.56 KG/M2 | HEIGHT: 65 IN | WEIGHT: 159.39 LBS | TEMPERATURE: 97.4 F

## 2021-01-11 DIAGNOSIS — K76.9 LIVER DISEASE: ICD-10-CM

## 2021-01-11 DIAGNOSIS — C20 MALIGNANT NEOPLASM OF RECTUM (HCC): ICD-10-CM

## 2021-01-11 DIAGNOSIS — C78.7 SECONDARY MALIGNANT NEOPLASM OF LIVER (HCC): Chronic | ICD-10-CM

## 2021-01-11 DIAGNOSIS — C20 MALIGNANT NEOPLASM OF RECTUM (HCC): Chronic | ICD-10-CM

## 2021-01-11 DIAGNOSIS — Z23 NEED FOR VACCINATION: ICD-10-CM

## 2021-01-11 DIAGNOSIS — Z86.711 HISTORY OF PULMONARY EMBOLUS (PE): ICD-10-CM

## 2021-01-11 DIAGNOSIS — R74.8 ELEVATED ALKALINE PHOSPHATASE LEVEL: ICD-10-CM

## 2021-01-11 LAB
ALBUMIN SERPL BCP-MCNC: 3.4 G/DL (ref 3.2–4.9)
ALBUMIN/GLOB SERPL: 1 G/DL
ALP SERPL-CCNC: 181 U/L (ref 30–99)
ALT SERPL-CCNC: 16 U/L (ref 2–50)
ANION GAP SERPL CALC-SCNC: 12 MMOL/L (ref 7–16)
AST SERPL-CCNC: 22 U/L (ref 12–45)
BASOPHILS # BLD AUTO: 0.8 % (ref 0–1.8)
BASOPHILS # BLD: 0.07 K/UL (ref 0–0.12)
BILIRUB SERPL-MCNC: 0.6 MG/DL (ref 0.1–1.5)
BUN SERPL-MCNC: 15 MG/DL (ref 8–22)
CALCIUM SERPL-MCNC: 9.5 MG/DL (ref 8.5–10.5)
CEA SERPL-MCNC: 1.9 NG/ML (ref 0–3)
CHLORIDE SERPL-SCNC: 103 MMOL/L (ref 96–112)
CO2 SERPL-SCNC: 21 MMOL/L (ref 20–33)
CREAT SERPL-MCNC: 0.95 MG/DL (ref 0.5–1.4)
EOSINOPHIL # BLD AUTO: 0.35 K/UL (ref 0–0.51)
EOSINOPHIL NFR BLD: 3.9 % (ref 0–6.9)
ERYTHROCYTE [DISTWIDTH] IN BLOOD BY AUTOMATED COUNT: 57.8 FL (ref 35.9–50)
GLOBULIN SER CALC-MCNC: 3.5 G/DL (ref 1.9–3.5)
GLUCOSE SERPL-MCNC: 104 MG/DL (ref 65–99)
HCT VFR BLD AUTO: 39.3 % (ref 37–47)
HGB BLD-MCNC: 12.4 G/DL (ref 12–16)
IMM GRANULOCYTES # BLD AUTO: 0.07 K/UL (ref 0–0.11)
IMM GRANULOCYTES NFR BLD AUTO: 0.8 % (ref 0–0.9)
LYMPHOCYTES # BLD AUTO: 1.32 K/UL (ref 1–4.8)
LYMPHOCYTES NFR BLD: 14.7 % (ref 22–41)
MCH RBC QN AUTO: 28.1 PG (ref 27–33)
MCHC RBC AUTO-ENTMCNC: 31.6 G/DL (ref 33.6–35)
MCV RBC AUTO: 88.9 FL (ref 81.4–97.8)
MONOCYTES # BLD AUTO: 0.99 K/UL (ref 0–0.85)
MONOCYTES NFR BLD AUTO: 11 % (ref 0–13.4)
NEUTROPHILS # BLD AUTO: 6.2 K/UL (ref 2–7.15)
NEUTROPHILS NFR BLD: 68.8 % (ref 44–72)
NRBC # BLD AUTO: 0 K/UL
NRBC BLD-RTO: 0 /100 WBC
PLATELET # BLD AUTO: 269 K/UL (ref 164–446)
PMV BLD AUTO: 9.8 FL (ref 9–12.9)
POTASSIUM SERPL-SCNC: 3.9 MMOL/L (ref 3.6–5.5)
PROT SERPL-MCNC: 6.9 G/DL (ref 6–8.2)
RBC # BLD AUTO: 4.42 M/UL (ref 4.2–5.4)
SODIUM SERPL-SCNC: 136 MMOL/L (ref 135–145)
WBC # BLD AUTO: 9 K/UL (ref 4.8–10.8)

## 2021-01-11 PROCEDURE — 82378 CARCINOEMBRYONIC ANTIGEN: CPT

## 2021-01-11 PROCEDURE — 700117 HCHG RX CONTRAST REV CODE 255: Performed by: SURGERY

## 2021-01-11 PROCEDURE — 85025 COMPLETE CBC W/AUTO DIFF WBC: CPT

## 2021-01-11 PROCEDURE — 80053 COMPREHEN METABOLIC PANEL: CPT

## 2021-01-11 PROCEDURE — 36591 DRAW BLOOD OFF VENOUS DEVICE: CPT

## 2021-01-11 PROCEDURE — 700111 HCHG RX REV CODE 636 W/ 250 OVERRIDE (IP)

## 2021-01-11 PROCEDURE — A4212 NON CORING NEEDLE OR STYLET: HCPCS

## 2021-01-11 PROCEDURE — A9581 GADOXETATE DISODIUM INJ: HCPCS | Performed by: SURGERY

## 2021-01-11 PROCEDURE — 74183 MRI ABD W/O CNTR FLWD CNTR: CPT

## 2021-01-11 PROCEDURE — 99214 OFFICE O/P EST MOD 30 MIN: CPT | Performed by: INTERNAL MEDICINE

## 2021-01-11 RX ORDER — PROCHLORPERAZINE MALEATE 10 MG
10 TABLET ORAL EVERY 6 HOURS PRN
Status: CANCELLED | OUTPATIENT
Start: 2021-01-26

## 2021-01-11 RX ORDER — ONDANSETRON 8 MG/1
8 TABLET, ORALLY DISINTEGRATING ORAL
Status: CANCELLED | OUTPATIENT
Start: 2021-01-26

## 2021-01-11 RX ORDER — HEPARIN SODIUM (PORCINE) LOCK FLUSH IV SOLN 100 UNIT/ML 100 UNIT/ML
SOLUTION INTRAVENOUS
Status: COMPLETED
Start: 2021-01-11 | End: 2021-01-11

## 2021-01-11 RX ORDER — DEXTROSE MONOHYDRATE 50 MG/ML
INJECTION, SOLUTION INTRAVENOUS CONTINUOUS
Status: CANCELLED | OUTPATIENT
Start: 2021-01-26

## 2021-01-11 RX ORDER — HEPARIN SODIUM (PORCINE) LOCK FLUSH IV SOLN 100 UNIT/ML 100 UNIT/ML
500 SOLUTION INTRAVENOUS PRN
Status: CANCELLED | OUTPATIENT
Start: 2021-01-26

## 2021-01-11 RX ORDER — ONDANSETRON 2 MG/ML
4 INJECTION INTRAMUSCULAR; INTRAVENOUS
Status: CANCELLED | OUTPATIENT
Start: 2021-01-26

## 2021-01-11 RX ORDER — HEPARIN SODIUM (PORCINE) LOCK FLUSH IV SOLN 100 UNIT/ML 100 UNIT/ML
500 SOLUTION INTRAVENOUS PRN
Status: CANCELLED | OUTPATIENT
Start: 2021-01-14

## 2021-01-11 RX ORDER — HEPARIN SODIUM (PORCINE) LOCK FLUSH IV SOLN 100 UNIT/ML 100 UNIT/ML
500 SOLUTION INTRAVENOUS PRN
Status: CANCELLED | OUTPATIENT
Start: 2021-01-28

## 2021-01-11 RX ORDER — LIDOCAINE HYDROCHLORIDE 10 MG/ML
10 INJECTION, SOLUTION INFILTRATION; PERINEURAL ONCE
Status: CANCELLED | OUTPATIENT
Start: 2021-01-26 | End: 2021-01-26

## 2021-01-11 RX ORDER — DEXAMETHASONE SODIUM PHOSPHATE 4 MG/ML
8 INJECTION, SOLUTION INTRA-ARTICULAR; INTRALESIONAL; INTRAMUSCULAR; INTRAVENOUS; SOFT TISSUE ONCE
Status: CANCELLED | OUTPATIENT
Start: 2021-01-26 | End: 2021-01-26

## 2021-01-11 RX ADMIN — HEPARIN 5 ML: 100 SYRINGE at 09:43

## 2021-01-11 RX ADMIN — GADOXETATE DISODIUM 10 ML: 181.43 INJECTION, SOLUTION INTRAVENOUS at 09:14

## 2021-01-11 ASSESSMENT — FIBROSIS 4 INDEX
FIB4 SCORE: 1.64
FIB4 SCORE: 1.26

## 2021-01-11 ASSESSMENT — PAIN SCALES - GENERAL: PAINLEVEL: NO PAIN

## 2021-01-11 ASSESSMENT — PATIENT HEALTH QUESTIONNAIRE - PHQ9: CLINICAL INTERPRETATION OF PHQ2 SCORE: 0

## 2021-01-11 NOTE — PROGRESS NOTES
Pt arrived ambulatory to [facility] for port access prior to MRI.  POC discussed.  L chest port in place with EMLA cream, accessed in sterile field.  Brisk blood return noted.  Labs drawn as ordered for chemotherapy tomorrow.  Port flushed, heparin held until patient returns after scan.  Port will be heparinized upon return.  Pt discharged from [facility] in NAD under self care.

## 2021-01-11 NOTE — PROGRESS NOTES
Subjective:   1/12/2021  1:22 PM  Primary care physician:Manan Quiñonez M.D.  Referring Provider: Eric Hardy M.D.  Medical Oncologist: Eric Hardy M.D       Chief Complaint:   Chief Complaint   Patient presents with   • Follow-Up     FV MRI RECTAL CA W/LIVER METS      Diagnosis:   1. Secondary malignant neoplasm of liver (HCC)     2. Malignant neoplasm of rectum (HCC)     3. Liver disease         History of presenting illness:  Karlene Walters  is a pleasant 80 y.o. year old female who presented with follow-up status post MRI of the liver for surveillance for metastatic colon cancer to the liver.  She is done extremely well since she was seen last.  She denies any fever or chills, nausea or vomiting.  She has had some bouts of shortness of breath and epigastric right upper quadrant pain when exerting herself.  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  I personally reviewed the patient's MRI which was dated January 11, 2021. it does appear as described above.  She denies any weight loss.  She has an appoint with her pulmonologist in May with repeat imaging of the chest.  She is here to discuss neck steps.      Past Medical History:   Diagnosis Date   • Adverse effect of anesthesia     elevated BP postop   • Anesthesia    • Arrhythmia     occasional   • Blood clotting disorder (HCC) 08/2015    bilat PE    • Blood transfusion 1957   • Breath shortness     pt reports d/t chemo treatment; no O2; with moderate exertion; no c/o at this time   • Cancer (HCC) 09/2012    rectal cancer,  Liver CA-2015   • CATARACT     removed b/l   • Chemotherapy-induced neutropenia (HCC) 9/28/2016   • Chickenpox     history of   • Colon cancer (HCC)    • Dental disorder     dentures UPPERS AND LOWERS   • Elevated alkaline phosphatase level 11/15/2017   • Emphysema of lung (HCC)     COPD   • Fall    • Kiswahili measles     history of   • Heart murmur      as a child   • Hemorrhagic disorder (HCC)     on Xarelto    • High cholesterol    • Hypercholesteremia    • Hypertension     no meds currently    • Ileostomy in place (HCC)    • Ileostomy in place (HCC)    • Indigestion    • Influenza     history of   • Lightheadedness 9/17/2015   • Mumps     history of   • Obstruction     ileostomy   • Other specified symptom associated with female genital organs     HYSTERECTOMY 1993   • Pneumonia 05/2017    tx'd with antibx   • Pulmonary embolism (HCC)    • 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)    • Renal insufficiency 3/14/2016   • Restless legs 5/6/2020   • Routine general medical examination at a health care facility 3/14/2016    3/14/16   • Seasonal allergies    • Swelling of both ankles     Lasix PRN   • Tonsillitis      Past Surgical History:   Procedure Laterality Date   • HEPATIC ABLATION  5/28/2020    Procedure: ABLATION, LESION, LIVER-TRISEGMENTECTOMY, MICROWAVE ABLATION, INTRA OPERATIVE ULTRA SOUND, SIMPLE RESECTED / REPAIR OF DIAPHRAGM;  Surgeon: Kit West M.D.;  Location: SURGERY [facility];  Service: General   • HEPATIC ABLATION LAPAROSCOPIC  11/15/2018    Procedure: HEPATIC ABLATION LAPAROSCOPIC.- SEGMENT 7/8;  Surgeon: Kit West M.D.;  Location: SURGERY [facility];  Service: General   • COLONOSCOPY WITH BIOPSY  8/23/2015    Procedure: COLONOSCOPY WITH BIOPSY;  Surgeon: Wilbur Glasgow M.D.;  Location: ENDOSCOPY [facility];  Service:    • HEPATIC ABLATION LAPAROSCOPIC  7/6/2015    Procedure: HEPATIC ABLATION LAPAROSCOPIC.;  Surgeon: Kit West M.D.;  Location: SURGERY [facility];  Service:    • CATH PLACEMENT Left 7/6/2015    Procedure: CATH PLACEMENT CEPHALIC POWERPORT;  Surgeon: Kit West M.D.;  Location: SURGERY [facility];  Service:    • FISTULA IN ANO REPAIR  1/28/2015    Performed by Kolton Montgomery M.D. at [facility]   • PERINEAL PROCEDURE  1/28/2015     Performed by Kolton Montgomery M.D. at [facility]   • FISTULA IN ANO REPAIR  10/15/2014    Performed by Kolton Montgomery M.D. at SURGERY [facility]   • PERINEAL PROCEDURE  10/15/2014    Performed by Kolton Montgomery M.D. at [facility]   • IRRIGATION & DEBRIDEMENT GENERAL  2/19/2014    Performed by Kolton Montgomery M.D. at SURGERY [facility]   • FLAP GRAFT  2/19/2014    Performed by Kolton Montgomery M.D. at [facility]   • PERINEAL PROCEDURE  12/18/2013    Performed by Koltno Montgomery M.D. at [facility]   • MYOCUTANEOUS FLAP  12/18/2013    Performed by Kolton Montgomery M.D. at [facility]   • IRRIGATION & DEBRIDEMENT GENERAL  10/23/2013    Performed by Kolton Montgomery M.D. at [facility]   • FISTULA IN ANO REPAIR  9/4/2013    Performed by Kolton Montgomery M.D. at SURGERY [facility]   • FLAP ROTATION  9/4/2013    Performed by Kolton Montgomery M.D. at [facility]   • RECOVERY  8/26/2013    Performed by [facility] at [facility] DAY [facility]   • BIOPSY GENERAL  7/17/2013    Performed by Kolton Montgomery M.D. at [facility]   • PROCTOSCOPY  7/17/2013    Performed by Kolton Montgomery M.D. at [facility]   • FISTULA IN ANO REPAIR  2/27/2013    Performed by Kolton Montgomery M.D. at [facility]   • SIGMOIDOSCOPY  2/27/2013    Performed by Kolton Montgomery M.D. at [facility]   • LAPAROSCOPY  10/8/2012    Performed by Kolton Montgomery M.D. at [facility]   • ILEO LOOP DIVERSION  10/8/2012    Performed by Kolton Montgomery M.D. at [facility]   • COLECTOMY  9/26/2012    Performed by Kolton Montgomery M.D. at [facility]   • COLONOSCOPY FLEX W/ENDO US  9/20/2012    Performed by Harshil Bolton M.D. at SURGERY [facility]   • OTHER  2009    left eye torn retina repair   • CATARACT EXTRACTION WITH IOL  2004    bilateral   • ABDOMINAL HYSTERECTOMY TOTAL  1983   • CYST  "EXCISION  1972    ovarian   • COLON RESECTION     • EYE SURGERY      cataracts   • HYSTERECTOMY LAPAROSCOPY     • PB REMV 2ND CATARACT,CORN-SCLER SECTN     • TONSILLECTOMY       Allergies   Allergen Reactions   • Oxaliplatin Anaphylaxis   • Codeine      \"gets drunk\"   • Pcn [Penicillins] Itching     itching   • Sulfa Drugs Itching     itching   • Tape Rash     PAPER TAPE OK     Outpatient Encounter Medications as of 1/12/2021   Medication Sig Dispense Refill   • Tiotropium Bromide-Olodaterol (STIOLTO RESPIMAT) 2.5-2.5 MCG/ACT Aero Soln Take 2 Puffs by mouth every day. 2 Each 0   • furosemide (LASIX) 40 MG Tab      • potassium chloride ER (KLOR-CON) 10 MEQ tablet Take 1 Tab by mouth every day. 100 Tab 3   • rivaroxaban (XARELTO) 10 MG Tab tablet Take 1 Tab by mouth every day. 30 Tab 5   • ondansetron (ZOFRAN) 4 MG Tab tablet Take 1 Tab by mouth every four hours as needed. 30 Tab 5   • lidocaine-prilocaine (EMLA) 2.5-2.5 % Cream Apply to port 1 hour prior to access of port and cover with plastic wrap. 30 g 3   • Tiotropium Bromide-Olodaterol (STIOLTO RESPIMAT) 2.5-2.5 MCG/ACT Aero Soln Take 2 Puffs by mouth every day. 1 Inhaler 11   • albuterol 108 (90 Base) MCG/ACT Aero Soln inhalation aerosol Inhale 2 Puffs by mouth every 6 hours as needed for Shortness of Breath. 8.5 g 11   • metronidazole (METROCREAM) 0.75 % cream Apply 1 Each to affected area(s) 2 times a day.     • cyanocobalamin (VITAMIN B-12) 500 MCG Tab Take 1,000 mcg by mouth every day.     • Multiple Vitamins-Minerals (CENTRUM SILVER PO) Take 1 Tab by mouth every day.     • Cholecalciferol (VITAMIN D3) 400 UNITS CAPS Take 1 Cap by mouth every day.     • loratadine (CLARITIN) 10 MG TABS Take 10 mg by mouth every day. Indications: Hayfever       No facility-administered encounter medications on file as of 1/12/2021.      Social History     Socioeconomic History   • Marital status:      Spouse name: Not on file   • Number of children: Not on file   • " Years of education: Not on file   • Highest education level: Not on file   Occupational History   • Not on file   Social Needs   • Financial resource strain: Not hard at all   • Food insecurity     Worry: Never true     Inability: Never true   • Transportation needs     Medical: No     Non-medical: No   Tobacco Use   • Smoking status: Never Smoker   • Smokeless tobacco: Never Used   Substance and Sexual Activity   • Alcohol use: No   • Drug use: No   • Sexual activity: Never     Partners: Male     Birth control/protection: Post-Menopausal   Lifestyle   • Physical activity     Days per week: Not on file     Minutes per session: Not on file   • Stress: Not on file   Relationships   • Social connections     Talks on phone: Not on file     Gets together: Not on file     Attends [employer] service: Not on file     Active member of club or organization: Not on file     Attends meetings of clubs or organizations: Not on file     Relationship status: Not on file   • Intimate partner violence     Fear of current or ex partner: Not on file     Emotionally abused: Not on file     Physically abused: Not on file     Forced sexual activity: Not on file   Other Topics Concern   • Primary/coprimary nurse & associates Not Asked   • Family contact information Not Asked   • OK to release patient information to the following Not Asked   • Patient preferred routine/Privacy concerns Not Asked   • Patient likes and dislikes Not Asked   • Participating In Research Study Not Asked   • Miscellaneous Not Asked   Social History Narrative   • Not on file      Social History     Tobacco Use   Smoking Status Never Smoker   Smokeless Tobacco Never Used     Social History     Substance and Sexual Activity   Alcohol Use No     Social History     Substance and Sexual Activity   Drug Use No        Family History   Problem Relation Age of Onset   • Heart Disease Mother    • Heart Failure Mother    • Hypertension Mother    • Hyperlipidemia Mother    •  "Cancer Mother    • Cancer Maternal Aunt 60        breast ca   • Lung Disease Brother         COPD/Emphysema/Smoker   • Cancer Brother         prostate cancer   • Alcohol/Drug Brother         Alcohol   • Kidney Disease Father         renal failure       Review of Systems   Respiratory: Positive for shortness of breath.    Gastrointestinal: Positive for abdominal pain.   All other systems reviewed and are negative.       Objective:   /82 (BP Location: Right arm, Patient Position: Sitting, BP Cuff Size: Adult)   Pulse (!) 105   Temp 36.2 °C (97.2 °F) (Temporal)   Ht 1.676 m (5' 6\")   Wt 71.2 kg (157 lb)   LMP  (LMP Unknown)   SpO2 96%   BMI 25.34 kg/m²     Physical Exam   Constitutional: She is oriented to person, place, and time. She appears well-developed and well-nourished.   HENT:   Head: Normocephalic and atraumatic.   Eyes: Pupils are equal, round, and reactive to light. Conjunctivae are normal.   Neck: Normal range of motion. Neck supple.   Cardiovascular: Normal rate and regular rhythm.   Pulmonary/Chest: Effort normal and breath sounds normal.   Abdominal: Soft. Bowel sounds are normal. There is abdominal tenderness.   Musculoskeletal: Normal range of motion.   Neurological: She is alert and oriented to person, place, and time.   Skin: Skin is warm and dry.   Psychiatric: She has a normal mood and affect. Her behavior is normal. Judgment and thought content normal.   Nursing note and vitals reviewed.      Labs:  Results for KANG HALL (MRN [id number]) as of 1/11/2021 14:46   Ref. Range 1/11/2021 07:45   WBC Latest Ref Range: 4.8 - 10.8 K/uL 9.0   RBC Latest Ref Range: 4.20 - 5.40 M/uL 4.42   Hemoglobin Latest Ref Range: 12.0 - 16.0 g/dL 12.4   Hematocrit Latest Ref Range: 37.0 - 47.0 % 39.3   MCV Latest Ref Range: 81.4 - 97.8 fL 88.9   MCH Latest Ref Range: 27.0 - 33.0 pg 28.1   MCHC Latest Ref Range: 33.6 - 35.0 g/dL 31.6 (L)   RDW Latest Ref Range: 35.9 - 50.0 fL 57.8 (H)   Platelet Count " Latest Ref Range: 164 - 446 K/uL 269   MPV Latest Ref Range: 9.0 - 12.9 fL 9.8   Neutrophils-Polys Latest Ref Range: 44.00 - 72.00 % 68.80   Neutrophils (Absolute) Latest Ref Range: 2.00 - 7.15 K/uL 6.20   Lymphocytes Latest Ref Range: 22.00 - 41.00 % 14.70 (L)   Lymphs (Absolute) Latest Ref Range: 1.00 - 4.80 K/uL 1.32   Monocytes Latest Ref Range: 0.00 - 13.40 % 11.00   Monos (Absolute) Latest Ref Range: 0.00 - 0.85 K/uL 0.99 (H)   Eosinophils Latest Ref Range: 0.00 - 6.90 % 3.90   Eos (Absolute) Latest Ref Range: 0.00 - 0.51 K/uL 0.35   Basophils Latest Ref Range: 0.00 - 1.80 % 0.80   Baso (Absolute) Latest Ref Range: 0.00 - 0.12 K/uL 0.07   Immature Granulocytes Latest Ref Range: 0.00 - 0.90 % 0.80   Immature Granulocytes (abs) Latest Ref Range: 0.00 - 0.11 K/uL 0.07   Nucleated RBC Latest Units: /100 WBC 0.00   NRBC (Absolute) Latest Units: K/uL 0.00   Sodium Latest Ref Range: 135 - 145 mmol/L 136   Potassium Latest Ref Range: 3.6 - 5.5 mmol/L 3.9   Chloride Latest Ref Range: 96 - 112 mmol/L 103   Co2 Latest Ref Range: 20 - 33 mmol/L 21   Anion Gap Latest Ref Range: 7.0 - 16.0  12.0   Glucose Latest Ref Range: 65 - 99 mg/dL 104 (H)   Bun Latest Ref Range: 8 - 22 mg/dL 15   Creatinine Latest Ref Range: 0.50 - 1.40 mg/dL 0.95   GFR If  Latest Ref Range: >60 mL/min/1.73 m 2 >60   GFR If Non  Latest Ref Range: >60 mL/min/1.73 m 2 56 (A)   Calcium Latest Ref Range: 8.5 - 10.5 mg/dL 9.5   AST(SGOT) Latest Ref Range: 12 - 45 U/L 22   ALT(SGPT) Latest Ref Range: 2 - 50 U/L 16   Alkaline Phosphatase Latest Ref Range: 30 - 99 U/L 181 (H)   Total Bilirubin Latest Ref Range: 0.1 - 1.5 mg/dL 0.6   Albumin Latest Ref Range: 3.2 - 4.9 g/dL 3.4   Total Protein Latest Ref Range: 6.0 - 8.2 g/dL 6.9   Globulin Latest Ref Range: 1.9 - 3.5 g/dL 3.5   A-G Ratio Latest Units: g/dL 1.0   Carcinoembryonic Antigen Latest Ref Range: 0.0 - 3.0 ng/mL 1.9       Imagin21 MRI   Per my read,      IMPRESSION:     1.  Changes consistent with partial right hepatectomy and there are 2 ablation cavity left at the junction of the hepatic segments 8, 5, 4A, and IVb.     2.  The ablation cavity show no enhancement, alteration of diffusion signal, or change and therefore there is complete response to therapy     3.  Stable 2.2 x 2.1 cm enhancing right cardiophrenic angle mass consistent with either adenopathy or less likely pulmonary parenchymal mass. This could be either treated or stable metastasis     4.  Prior cholecystectomy. No biliary dilation.            Pathology: 5/28/20     FINAL DIAGNOSIS:     A. Falciform ligament:          Portion of benign fibrovascular adipose tissue.          No malignancy identified.      Procedures: 5/28/20     1. Exploratory laparotomy.  2. Open microwave thermal ablation x10 involving 2 separate lesions in the   right lobe and medial segment of the left lobe.  This was done with 6   overlapping 100 watt at 2-minute ablation giving us 6 overlapping 3.2x4.0 cm   ablation cavities.  The second ablation was essentially right at the origin of   the right portal pedicle approximately 2 cm away from the left portal   pedicle, which most of her liver function is from that left lobe.  She went   over a single ablation of 100 watt at 2 minutes giving us a 3x3.4 cm ablation   of a 2 cm tumor.  The rest of her liver was clean.  We were confident that we   were away enough from the hilum and the left portal pedicle, but we were   unable to do the right lobe resection due to involvement of the diaphragm,   which we had to resect and repair as well as the main right portal pedicle   near the hilum, we felt that we were going to risk injuring her left lobe   pedicle.  3. Resection and simple repair of right hemidiaphragm.       Diagnosis:     1. Secondary malignant neoplasm of liver (HCC)     2. Malignant neoplasm of rectum (HCC)     3. Liver disease             Medical Decision Making:   Today's Assessment / Status / Plan:     In light of the present findings, the patient is doing well.  Her MRI showed no sign of recurrent disease in the liver.  There is still this small cardiophrenic lymph node that is stable but enhances.  It is unclear whether this is metastatic disease or not.  She does have an appointment with pulmonary will manage her shortness of breath but if this lesion changes and that she might be a candidate for repeat PET scan and possible SBRT to treat that isolated.  In the meantime she will see me in 3 months.  I will arrange for an MRI of the liver.    I, Dr. West have entered, reviewed and confirmed the above diagnoses related to this patient on this date of service, 1/12/2021  1:22 PM.    She agreed and verbalized her agreement and understanding with the current plan. I answered all questions and concerns she has at this time and advised her to call at any time in the interim with questions or concerns in regards to her care.    Thank you for allowing me to participate in her care, I will continue to follow closely.       Please note that this dictation was created using voice recognition software. I have made every reasonable attempt to correct obvious errors, but I expect that there are errors of grammar and possibly content that I did not discover before finalizing the note.     Thank you for this consultation and allowing me to participate in your patient's care. If I can be of further service please contact my office.

## 2021-01-11 NOTE — PROGRESS NOTES
Pt returned after MRI to have port heparinized.  Port flushed per policy and left in place for treatment tomorrow.  Pt discharged from [facility] in NAD under self care.

## 2021-01-11 NOTE — PROGRESS NOTES
"01/11/21    Subjective    Chief Complaint:  Rectal cancer metastatic to liver    HPI:  80 female from [place] with rectal cancer KRAS mutated, MSI stable, initially diagnosed in 2012. Liver and lung metastases diagnosed in 2015. Originally treated with Xelox, developed Oxaliplatin allergy and converted to single agent 5FU which she has been on ever since. She has had Y90 and several liver microwave ablation procedures. We had attempted to spread out her 5FU infusions but when we did her CEA bumped up and she developed new liver lesions. Her last ablation was in 5/28/20. CEA is creeping up again. Alkaline phosphatase is elevated but actually down from prior values.  MRI of the liver done this am read as stable treated metastases with no evidence of new or progressive lesions. Only c/o SOB and fatigue with exertion. Has a CT chest scheduled for 5/21. Worried about rising CEA (1.9) although it has been as high as 14 in prior years.     ROS:    Constitutional: No weight loss  Skin: No rash or jaundice  HENT: No change in eyesight or hearing  Cardiovascular:No chest pain or arrythmia  Respiratory: SOB as per PI  GI:No nausea, vomiting, diarrhea, constipation. Has colostomy.   :No dysuria or frequency  Musculoskeletal:No bone or joint pain  Neuro:No sx's of neuropathy  Psych: No complaints    PMH:      Allergies   Allergen Reactions   • Oxaliplatin Anaphylaxis   • Codeine      \"gets drunk\"   • Pcn [Penicillins] Itching     itching   • Sulfa Drugs Itching     itching   • Tape Rash     PAPER TAPE OK       Past Medical History:   Diagnosis Date   • Adverse effect of anesthesia     elevated BP postop   • Anesthesia    • Arrhythmia     occasional   • Blood clotting disorder (HCC) 08/2015    bilat PE    • Blood transfusion 1957   • Breath shortness     pt reports d/t chemo treatment; no O2; with moderate exertion; no c/o at this time   • Cancer (HCC) 09/2012    rectal cancer,  Liver CA-2015   • CATARACT     removed b/l   • " Chemotherapy-induced neutropenia (HCC) 9/28/2016   • Chickenpox     history of   • Colon cancer (HCC)    • Dental disorder     dentures UPPERS AND LOWERS   • Elevated alkaline phosphatase level 11/15/2017   • Emphysema of lung (HCC)     COPD   • Fall    • [place] measles     history of   • Heart murmur     as a child   • Hemorrhagic disorder (HCC)     on Xarelto    • High cholesterol    • Hypercholesteremia    • Hypertension     no meds currently    • Ileostomy in place (HCC)    • Ileostomy in place (HCC)    • Indigestion    • Influenza     history of   • Lightheadedness 9/17/2015   • Mumps     history of   • Obstruction     ileostomy   • Other specified symptom associated with female genital organs     HYSTERECTOMY 1993   • Pneumonia 05/2017    tx'd with antibx   • Pulmonary embolism (HCC)    • 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)    • Renal insufficiency 3/14/2016   • Restless legs 5/6/2020   • Routine general medical examination at a health care facility 3/14/2016    3/14/16   • Seasonal allergies    • Swelling of both ankles     Lasix PRN   • Tonsillitis         Past Surgical History:   Procedure Laterality Date   • HEPATIC ABLATION  5/28/2020    Procedure: ABLATION, LESION, LIVER-TRISEGMENTECTOMY, MICROWAVE ABLATION, INTRA OPERATIVE ULTRA SOUND, SIMPLE RESECTED / REPAIR OF DIAPHRAGM;  Surgeon: Kit West M.D.;  Location: SURGERY [facility];  Service: General   • HEPATIC ABLATION LAPAROSCOPIC  11/15/2018    Procedure: HEPATIC ABLATION LAPAROSCOPIC.- SEGMENT 7/8;  Surgeon: Kit West M.D.;  Location: SURGERY [facility];  Service: General   • COLONOSCOPY WITH BIOPSY  8/23/2015    Procedure: COLONOSCOPY WITH BIOPSY;  Surgeon: Wilbur Glasgow M.D.;  Location: ENDOSCOPY [facility];  Service:    • HEPATIC ABLATION LAPAROSCOPIC  7/6/2015    Procedure: HEPATIC ABLATION LAPAROSCOPIC.;  Surgeon: Kit West M.D.;  Location: SURGERY [facility];   Service:    • CATH PLACEMENT Left 7/6/2015    Procedure: CATH PLACEMENT CEPHALIC POWERPORT;  Surgeon: Kit West M.D.;  Location: [facility];  Service:    • FISTULA IN ANO REPAIR  1/28/2015    Performed by Kolton Montgomery M.D. at [facility]   • PERINEAL PROCEDURE  1/28/2015    Performed by Kolton Montgomery M.D. at [facility]   • FISTULA IN ANO REPAIR  10/15/2014    Performed by Kolton Montgomery M.D. at [facility]   • PERINEAL PROCEDURE  10/15/2014    Performed by Kolton Montgomery M.D. at [facility]   • IRRIGATION & DEBRIDEMENT GENERAL  2/19/2014    Performed by Kolton Montgomery M.D. at [facility]   • FLAP GRAFT  2/19/2014    Performed by Kolton Montgomery M.D. at [facility]   • PERINEAL PROCEDURE  12/18/2013    Performed by Kolton Montgomery M.D. at [facility]   • MYOCUTANEOUS FLAP  12/18/2013    Performed by Kolton Montgomery M.D. at [facility]   • IRRIGATION & DEBRIDEMENT GENERAL  10/23/2013    Performed by Kolton Montgomery M.D. at [facility]   • FISTULA IN ANO REPAIR  9/4/2013    Performed by Kolton Montgomery M.D. at [facility]   • FLAP ROTATION  9/4/2013    Performed by Kolton Montgomery M.D. at [facility]   • RECOVERY  8/26/2013    Performed by Cath-Recovery Surgery at [facility] SAME DAY [facility]   • BIOPSY GENERAL  7/17/2013    Performed by Kolton Montgomery M.D. at SURGERY [facility]   • PROCTOSCOPY  7/17/2013    Performed by Kolton Montgomery M.D. at [facility]   • FISTULA IN ANO REPAIR  2/27/2013    Performed by Kolton Montgomery M.D. at [facility]   • SIGMOIDOSCOPY  2/27/2013    Performed by Kolton Montgomery M.D. at [facility]   • LAPAROSCOPY  10/8/2012    Performed by Kolton Montgomery M.D. at SURGERY [facility]   • ILEO LOOP DIVERSION  10/8/2012    Performed by Kolton Montgomery M.D. at SURGERY [facility]   • COLECTOMY  9/26/2012     Performed by Kolton Montgomery M.D. at SURGERY [facility] ORS   • COLONOSCOPY FLEX W/ENDO US  9/20/2012    Performed by Harshil Bolton M.D. at SURGERY [facility] ORS   • OTHER  2009    left eye torn retina repair   • CATARACT EXTRACTION WITH IOL  2004    bilateral   • ABDOMINAL HYSTERECTOMY TOTAL  1983   • CYST EXCISION  1972    ovarian   • COLON RESECTION     • EYE SURGERY      cataracts   • HYSTERECTOMY LAPAROSCOPY     • PB REMV 2ND CATARACT,CORN-SCLER SECTN     • TONSILLECTOMY          Medications:    Current Outpatient Medications on File Prior to Visit   Medication Sig Dispense Refill   • Tiotropium Bromide-Olodaterol (STIOLTO RESPIMAT) 2.5-2.5 MCG/ACT Aero Soln Take 2 Puffs by mouth every day. 2 Each 0   • furosemide (LASIX) 40 MG Tab      • potassium chloride ER (KLOR-CON) 10 MEQ tablet Take 1 Tab by mouth every day. 100 Tab 3   • rivaroxaban (XARELTO) 10 MG Tab tablet Take 1 Tab by mouth every day. 30 Tab 5   • ondansetron (ZOFRAN) 4 MG Tab tablet Take 1 Tab by mouth every four hours as needed. 30 Tab 5   • lidocaine-prilocaine (EMLA) 2.5-2.5 % Cream Apply to port 1 hour prior to access of port and cover with plastic wrap. 30 g 3   • Tiotropium Bromide-Olodaterol (STIOLTO RESPIMAT) 2.5-2.5 MCG/ACT Aero Soln Take 2 Puffs by mouth every day. 1 Inhaler 11   • albuterol 108 (90 Base) MCG/ACT Aero Soln inhalation aerosol Inhale 2 Puffs by mouth every 6 hours as needed for Shortness of Breath. 8.5 g 11   • metronidazole (METROCREAM) 0.75 % cream Apply 1 Each to affected area(s) 2 times a day.     • cyanocobalamin (VITAMIN B-12) 500 MCG Tab Take 1,000 mcg by mouth every day.     • Multiple Vitamins-Minerals (CENTRUM SILVER PO) Take 1 Tab by mouth every day.     • Cholecalciferol (VITAMIN D3) 400 UNITS CAPS Take 1 Cap by mouth every day.     • loratadine (CLARITIN) 10 MG TABS Take 10 mg by mouth every day. Indications: Hayfever       No current facility-administered medications on file prior to visit.        Social  "History     Tobacco Use   • Smoking status: Never Smoker   • Smokeless tobacco: Never Used   Substance Use Topics   • Alcohol use: No        Family History   Problem Relation Age of Onset   • Heart Disease Mother    • Heart Failure Mother    • Hypertension Mother    • Hyperlipidemia Mother    • Cancer Mother    • Cancer Maternal Aunt 60        breast ca   • Lung Disease Brother         COPD/Emphysema/Smoker   • Cancer Brother         prostate cancer   • Alcohol/Drug Brother         Alcohol   • Kidney Disease Father         renal failure        Objective    Vitals:    /80   Pulse 60   Temp 36.4 °C (97.5 °F)   Resp 16   Ht 1.626 m (5' 4\")   Wt 72.6 kg (160 lb 0.9 oz)   LMP  (LMP Unknown)   SpO2 93%   BMI 27.47 kg/m²     Physical Exam:    Appears well-developed and well-nourished. No distress.    Head -  Normocephalic .   Eyes - . Conjunctivae and EOM are normal. No scleral icterus.   Ears - normal hearing  Neck - Normal range of motion. Neck supple. No thyromegaly  Cardiovascular - Normal rate, regular rhythm, normal heart sounds and intact distal pulses. No  gallop, murmur or rub  Pulmonary - Normal breath sounds.  No wheeze, rales or rhonci  Breast - symmetrical. No mass on indentation.  Abdominal -Soft. No distension, tenderness, organomegaly or mass. Ostomy on right   Extremities-  No edema or tenderness.    Nodes - No submental, submandibular, preauricular, cervical, axillary or inguinal adenopathy.    Neurological -   Alert and oriented to person, place, and time. No focal findings  Skin - Skin is warm and dry. No rash noted. Not diaphoretic. No erythema. No pallor. No jaundice   Psychiatric -  Normal mood and affect.    Labs:  Results for KANG HALL (MRN [id number])    Ref. Range 1/11/2021 07:45   WBC Latest Ref Range: 4.8 - 10.8 K/uL 9.0   RBC Latest Ref Range: 4.20 - 5.40 M/uL 4.42   Hemoglobin Latest Ref Range: 12.0 - 16.0 g/dL 12.4   Hematocrit Latest Ref Range: 37.0 - 47.0 % 39.3   MCV " Latest Ref Range: 81.4 - 97.8 fL 88.9   MCH Latest Ref Range: 27.0 - 33.0 pg 28.1   MCHC Latest Ref Range: 33.6 - 35.0 g/dL 31.6 (L)   RDW Latest Ref Range: 35.9 - 50.0 fL 57.8 (H)   Platelet Count Latest Ref Range: 164 - 446 K/uL 269   Results for KANG HALL (MRN [id number])    Ref. Range 1/11/2021 07:45   Sodium Latest Ref Range: 135 - 145 mmol/L 136   Potassium Latest Ref Range: 3.6 - 5.5 mmol/L 3.9   Chloride Latest Ref Range: 96 - 112 mmol/L 103   Co2 Latest Ref Range: 20 - 33 mmol/L 21   Anion Gap Latest Ref Range: 7.0 - 16.0  12.0   Glucose Latest Ref Range: 65 - 99 mg/dL 104 (H)   Bun Latest Ref Range: 8 - 22 mg/dL 15   Creatinine Latest Ref Range: 0.50 - 1.40 mg/dL 0.95   GFR If  Latest Ref Range: >60 mL/min/1.73 m 2 >60   GFR If Non  Latest Ref Range: >60 mL/min/1.73 m 2 56 (A)   Calcium Latest Ref Range: 8.5 - 10.5 mg/dL 9.5   AST(SGOT) Latest Ref Range: 12 - 45 U/L 22   ALT(SGPT) Latest Ref Range: 2 - 50 U/L 16   Alkaline Phosphatase Latest Ref Range: 30 - 99 U/L 181 (H)   Total Bilirubin Latest Ref Range: 0.1 - 1.5 mg/dL 0.6   Albumin Latest Ref Range: 3.2 - 4.9 g/dL 3.4   Total Protein Latest Ref Range: 6.0 - 8.2 g/dL 6.9   Globulin Latest Ref Range: 1.9 - 3.5 g/dL 3.5   A-G Ratio Latest Units: g/dL 1.0   Results for KANG HALL (MRN [id number])    Ref. Range 11/17/2020 07:15 12/1/2020 07:20 12/15/2020 07:15 12/29/2020 07:15 1/11/2021 07:45   Carcinoembryonic Antigen Latest Ref Range: 0.0 - 3.0 ng/mL 1.2 1.6 1.6 1.7 1.9       Assessment  +/- stable  Imp:    Visit Diagnosis:    1. Malignant neoplasm of rectum (HCC)     2. Secondary malignant neoplasm of liver (HCC)     3. History of pulmonary embolus (PE)     4. Elevated alkaline phosphatase level           Plan:  Continue same regimen  Follow-ups already scheduled quite far out      Lucho Steele M.D.

## 2021-01-12 ENCOUNTER — APPOINTMENT (OUTPATIENT)
Dept: ONCOLOGY | Facility: MEDICAL CENTER | Age: 81
End: 2021-01-12
Attending: INTERNAL MEDICINE
Payer: MEDICARE

## 2021-01-12 ENCOUNTER — OFFICE VISIT (OUTPATIENT)
Dept: SURGICAL ONCOLOGY | Facility: MEDICAL CENTER | Age: 81
End: 2021-01-12
Payer: MEDICARE

## 2021-01-12 VITALS
HEIGHT: 66 IN | BODY MASS INDEX: 25.23 KG/M2 | HEART RATE: 105 BPM | DIASTOLIC BLOOD PRESSURE: 82 MMHG | TEMPERATURE: 97.2 F | WEIGHT: 157 LBS | OXYGEN SATURATION: 96 % | SYSTOLIC BLOOD PRESSURE: 152 MMHG

## 2021-01-12 VITALS
RESPIRATION RATE: 18 BRPM | DIASTOLIC BLOOD PRESSURE: 68 MMHG | SYSTOLIC BLOOD PRESSURE: 141 MMHG | OXYGEN SATURATION: 92 % | HEART RATE: 103 BPM | BODY MASS INDEX: 26.74 KG/M2 | TEMPERATURE: 97 F | WEIGHT: 160.5 LBS | HEIGHT: 65 IN

## 2021-01-12 DIAGNOSIS — C20 MALIGNANT NEOPLASM OF RECTUM (HCC): ICD-10-CM

## 2021-01-12 DIAGNOSIS — K76.9 LIVER DISEASE: ICD-10-CM

## 2021-01-12 DIAGNOSIS — C78.7 SECONDARY MALIGNANT NEOPLASM OF LIVER (HCC): ICD-10-CM

## 2021-01-12 PROCEDURE — 99214 OFFICE O/P EST MOD 30 MIN: CPT | Performed by: SURGERY

## 2021-01-12 PROCEDURE — 96374 THER/PROPH/DIAG INJ IV PUSH: CPT | Mod: XU

## 2021-01-12 PROCEDURE — 700111 HCHG RX REV CODE 636 W/ 250 OVERRIDE (IP): Performed by: NURSE PRACTITIONER

## 2021-01-12 PROCEDURE — G0498 CHEMO EXTEND IV INFUS W/PUMP: HCPCS

## 2021-01-12 RX ORDER — HEPARIN SODIUM (PORCINE) LOCK FLUSH IV SOLN 100 UNIT/ML 100 UNIT/ML
500 SOLUTION INTRAVENOUS PRN
Status: DISCONTINUED | OUTPATIENT
Start: 2021-01-12 | End: 2021-01-12 | Stop reason: HOSPADM

## 2021-01-12 RX ORDER — DEXAMETHASONE SODIUM PHOSPHATE 4 MG/ML
8 INJECTION, SOLUTION INTRA-ARTICULAR; INTRALESIONAL; INTRAMUSCULAR; INTRAVENOUS; SOFT TISSUE ONCE
Status: COMPLETED | OUTPATIENT
Start: 2021-01-12 | End: 2021-01-12

## 2021-01-12 RX ADMIN — DEXAMETHASONE SODIUM PHOSPHATE 8 MG: 4 INJECTION, SOLUTION INTRA-ARTICULAR; INTRALESIONAL; INTRAMUSCULAR; INTRAVENOUS; SOFT TISSUE at 08:34

## 2021-01-12 RX ADMIN — FLUOROURACIL 3495 MG: 50 INJECTION, SOLUTION INTRAVENOUS at 09:52

## 2021-01-12 ASSESSMENT — FIBROSIS 4 INDEX
FIB4 SCORE: 1.64
FIB4 SCORE: 1.64

## 2021-01-12 ASSESSMENT — ENCOUNTER SYMPTOMS
ABDOMINAL PAIN: 1
SHORTNESS OF BREATH: 1

## 2021-01-12 NOTE — PROGRESS NOTES
Chemotherapy Verification - PRIMARY RN      Height = 164 cm  Weight = 72.8 kg  BSA = 1.82 m^2       Medication: fluorouracil  Dose: 1920 mg/m^2 over 46 hours Calculated Dose: 3494.4 mg                            (In mg/m2, AUC, mg/kg)         I confirm this process was performed independently with the BSA and all final chemotherapy dosing calculations congruent.  Any discrepancies of 10% or greater have been addressed with the chemotherapy pharmacist. The resolution of the discrepancy has been documented in the EPIC progress notes.

## 2021-01-12 NOTE — PROGRESS NOTES
"Pharmacy Chemotherapy Verification Note:    Patient Name: Karlene Walters      Dx: Colon Ca      Protocol: 5-FU +        *Dosing Reference*                                    -- 10/31/17: delete Leucovorin and 5-FU push d/t neutropenia per Dr. Hardy   Fluorouracil 2400 mg/m² continuous infusion over 46-48 hours                              -- 20% reduction (1920 mg/m²) to be continued starting C28 per C Alsop APRN   14 day cycles for 12 cycles (adjuvant) or until disease progression or unacceptable toxicity    NCCN Guidelines for Colon Cancer. V.2.2015.  Jay GROVER, et al. Eur J Cancer.1999;35(9):1343-7.     Allergies:  Oxaliplatin; Codeine; Pcn [penicillins]; Sulfa drugs; and Tape     /68   Pulse (!) 103   Temp 36.1 °C (97 °F) (Temporal)   Resp 18   Ht 1.64 m (5' 4.57\")   Wt 72.8 kg (160 lb 7.9 oz)   LMP  (LMP Unknown)   SpO2 92%   BMI 27.07 kg/m²  Body surface area is 1.82 meters squared.   Labs from 1/11/21:  ANC~ 6200  Plt = 269 k  SCr = 0.95 mg/dL  CrCl = 54 mL/min     Drug Order   (Drug name, dose, route, IV Fluid & volume, frequency, number of doses) Cycle: 121 ([place] cycle 113)    Previous treatment: 6/16/20     Medication = Fluorouracil (5-FU)  Base Dose = 1920 mg/m²  Calc Dose: Base Dose x 1.82 m² = 3494 mg  Final Dose = 3495 mg  Route = CIVI via CADD pump  Fluid & Volume = 69.9 mL (+ 3 mL overfill = 72.9 mL)  Admin Duration = Over 46 hours (to run at 1.5 mL/hour)   via CADD pump       <10% difference, ok to treat with final written dose     By my signature below, I confirm this process was performed independently with the BSA and all final chemotherapy dosing calculations congruent. I have reviewed the above chemotherapy order and that my calculation of the final dose and BSA (when applicable) corroborate those calculations of the  pharmacist.     Rudy Arthur, PharmD, BCPS    "

## 2021-01-12 NOTE — PROGRESS NOTES
"Pharmacy Chemotherapy Verification Note:    Patient Name: Karlene Walters      Dx: Colon Ca        Cycle 121 ([place] cycle 113)     Previous treatment: 12/29/20    Protocol: 5-FU + Leucovorin      followed by     7/5/16: Dose reduced by 20% to 320 mg/m² starting with Cycle 18 ([place] #11)  due to neutropenia     10/31/17: delete Leucovorin and 5-FU push d/t neutropenia per Dr. Hardy   Fluorouracil continuous infusion over 46-48 hours     7/5/16: Infusion Dose reduced by 20% starting with Cycle 18 ([place] #11)  due to neutropenia    20% reduction (1920 mg/m²) to be continued starting C28 per C Alsop APRN    14 day cycles x12 cycles (adjuvant) or until progression/toxicity  NCCN Guidelines for Colon Cancer. V.2.2015.  Jay GROVER, et al. Eur J Cancer.1999;35(9):1343-7.     Allergies:  Oxaliplatin; Codeine; Pcn [penicillins]; Sulfa drugs; and Tape     /68   Pulse (!) 103   Temp 36.1 °C (97 °F) (Temporal)   Resp 18   Ht 1.64 m (5' 4.57\")   Wt 72.8 kg (160 lb 7.9 oz)   LMP  (LMP Unknown)   SpO2 92%   BMI 27.07 kg/m²  Body surface area is 1.82 meters squared.    Labs: 1/11/21  Meet treatment parameters    Fluorouracil (5-FU) 1920 mg/m² x 1.82 m² =  3494.4 mg   Okay to treat with final dose = 3495 mg CIVI over 46 hrs                   "

## 2021-01-12 NOTE — PROGRESS NOTES
Chemotherapy Verification - SECONDARY RN       Height = 164 cm  Weight = 72.8 kg  BSA = 1.82 m2       Medication: Fluoruracil home infusion  Dose: 1920 mg/m2  Calculated Dose: 3496.5 mg over 46 hours                             (In mg/m2, AUC, mg/kg)     I confirm that this process was performed independently.

## 2021-01-12 NOTE — PROGRESS NOTES
[facility]ene into [facility] for Day 1/ Cycle 113 of 5FU. Pt denied having any new or acute complaints today, reports tolerating past treatments well. Port accessed during prior appointment, had + blood return, flushed briskly. Pt given dexamethasone and attached to 5FU pump as prescribed, tolerated well, denied having any complaints during or after attachment of pump. Clamps left unclamped and CADD pump infusing at this time. Karlene placed pump in konrad pack, states she has no further needs at this time. Next appointment scheduled, Karlene discharged home to self care.

## 2021-01-14 ENCOUNTER — OUTPATIENT INFUSION SERVICES (OUTPATIENT)
Dept: ONCOLOGY | Facility: MEDICAL CENTER | Age: 81
End: 2021-01-14
Attending: INTERNAL MEDICINE
Payer: MEDICARE

## 2021-01-14 VITALS
RESPIRATION RATE: 18 BRPM | BODY MASS INDEX: 27.44 KG/M2 | OXYGEN SATURATION: 94 % | DIASTOLIC BLOOD PRESSURE: 75 MMHG | HEART RATE: 102 BPM | SYSTOLIC BLOOD PRESSURE: 149 MMHG | HEIGHT: 64 IN | WEIGHT: 160.72 LBS | TEMPERATURE: 97.5 F

## 2021-01-14 DIAGNOSIS — C20 MALIGNANT NEOPLASM OF RECTUM (HCC): ICD-10-CM

## 2021-01-14 PROCEDURE — 700111 HCHG RX REV CODE 636 W/ 250 OVERRIDE (IP): Performed by: INTERNAL MEDICINE

## 2021-01-14 PROCEDURE — 96523 IRRIG DRUG DELIVERY DEVICE: CPT

## 2021-01-14 RX ORDER — HEPARIN SODIUM (PORCINE) LOCK FLUSH IV SOLN 100 UNIT/ML 100 UNIT/ML
500 SOLUTION INTRAVENOUS PRN
Status: DISCONTINUED | OUTPATIENT
Start: 2021-01-14 | End: 2021-01-14 | Stop reason: HOSPADM

## 2021-01-14 RX ADMIN — HEPARIN 500 UNITS: 100 SYRINGE at 11:26

## 2021-01-14 ASSESSMENT — FIBROSIS 4 INDEX: FIB4 SCORE: 1.64

## 2021-01-14 NOTE — PROGRESS NOTES
Pt presented to infusion center for 5FU pump de-access. Pump had 0.00 ml in reservoir and 70.2 ml delivered. Pump disconnected, cleaned and returned to pharmacy with konrad pack in pt's labeled bag. Port flushed per protocol, brisk blood return observed, heparinized and de-accessed with needle intact, gauze dressing placed. Pt has next appt, discharged home to self care.

## 2021-01-15 ENCOUNTER — TELEPHONE (OUTPATIENT)
Dept: SLEEP MEDICINE | Facility: MEDICAL CENTER | Age: 81
End: 2021-01-15

## 2021-01-15 NOTE — TELEPHONE ENCOUNTER
Patient calling she see's Asha GONZALEZ and waiting us to know that she did complete a new MRI ABD ordered by Dr West on 1/11/21. It show the lung bases and OUR PROVIDER wanted to review for and changes. Patient is doing another MRI in April with Dr. West and WE have order CT chest to be done in May 2021.    Please review and let patientt know if she needs to do something SOONER. Patient continues with radiation treatment every 2 weeks and she drives in from [email or web address] to do so. Please call patient with your concerns and or review. [phone].

## 2021-01-18 NOTE — TELEPHONE ENCOUNTER
Dr. Thompson, could you please review this patient's prior images - under cancer treatment with Dr. West. See most recent PET CT 5/14/20, MRI abdomen 1/11/21... I previously ordered f/u CT of chest for 5/2021. Should we perform this sooner? Any other recommendations?

## 2021-01-26 ENCOUNTER — OUTPATIENT INFUSION SERVICES (OUTPATIENT)
Dept: ONCOLOGY | Facility: MEDICAL CENTER | Age: 81
End: 2021-01-26
Attending: INTERNAL MEDICINE
Payer: MEDICARE

## 2021-01-26 ENCOUNTER — OFFICE VISIT (OUTPATIENT)
Dept: HEMATOLOGY ONCOLOGY | Facility: MEDICAL CENTER | Age: 81
End: 2021-01-26
Payer: MEDICARE

## 2021-01-26 VITALS
RESPIRATION RATE: 18 BRPM | DIASTOLIC BLOOD PRESSURE: 69 MMHG | HEIGHT: 64 IN | WEIGHT: 159.61 LBS | OXYGEN SATURATION: 100 % | BODY MASS INDEX: 27.25 KG/M2 | TEMPERATURE: 97.5 F | SYSTOLIC BLOOD PRESSURE: 152 MMHG | HEART RATE: 104 BPM

## 2021-01-26 VITALS
OXYGEN SATURATION: 100 % | TEMPERATURE: 97.5 F | SYSTOLIC BLOOD PRESSURE: 152 MMHG | DIASTOLIC BLOOD PRESSURE: 69 MMHG | RESPIRATION RATE: 18 BRPM | HEART RATE: 104 BPM | BODY MASS INDEX: 27.25 KG/M2 | HEIGHT: 64 IN | WEIGHT: 159.61 LBS

## 2021-01-26 VITALS
HEART RATE: 103 BPM | RESPIRATION RATE: 19 BRPM | BODY MASS INDEX: 24.98 KG/M2 | SYSTOLIC BLOOD PRESSURE: 130 MMHG | DIASTOLIC BLOOD PRESSURE: 78 MMHG | TEMPERATURE: 98 F | HEIGHT: 67 IN | OXYGEN SATURATION: 99 % | WEIGHT: 159.17 LBS

## 2021-01-26 DIAGNOSIS — C20 MALIGNANT NEOPLASM OF RECTUM (HCC): ICD-10-CM

## 2021-01-26 DIAGNOSIS — C78.7 SECONDARY MALIGNANT NEOPLASM OF LIVER (HCC): ICD-10-CM

## 2021-01-26 LAB
ALBUMIN SERPL BCP-MCNC: 3.7 G/DL (ref 3.2–4.9)
ALBUMIN SERPL BCP-MCNC: 3.7 G/DL (ref 3.2–4.9)
ALBUMIN/GLOB SERPL: 1.2 G/DL
ALBUMIN/GLOB SERPL: 1.2 G/DL
ALP SERPL-CCNC: 412 U/L (ref 30–99)
ALP SERPL-CCNC: 418 U/L (ref 30–99)
ALT SERPL-CCNC: 49 U/L (ref 2–50)
ALT SERPL-CCNC: 52 U/L (ref 2–50)
ANION GAP SERPL CALC-SCNC: 11 MMOL/L (ref 7–16)
ANION GAP SERPL CALC-SCNC: 12 MMOL/L (ref 7–16)
AST SERPL-CCNC: 49 U/L (ref 12–45)
AST SERPL-CCNC: 57 U/L (ref 12–45)
BASOPHILS # BLD AUTO: 0.7 % (ref 0–1.8)
BASOPHILS # BLD: 0.05 K/UL (ref 0–0.12)
BILIRUB SERPL-MCNC: 0.5 MG/DL (ref 0.1–1.5)
BILIRUB SERPL-MCNC: 0.5 MG/DL (ref 0.1–1.5)
BUN SERPL-MCNC: 15 MG/DL (ref 8–22)
BUN SERPL-MCNC: 15 MG/DL (ref 8–22)
CALCIUM SERPL-MCNC: 9.4 MG/DL (ref 8.5–10.5)
CALCIUM SERPL-MCNC: 9.4 MG/DL (ref 8.5–10.5)
CEA SERPL-MCNC: 1.8 NG/ML (ref 0–3)
CHLORIDE SERPL-SCNC: 101 MMOL/L (ref 96–112)
CHLORIDE SERPL-SCNC: 102 MMOL/L (ref 96–112)
CO2 SERPL-SCNC: 20 MMOL/L (ref 20–33)
CO2 SERPL-SCNC: 20 MMOL/L (ref 20–33)
CREAT SERPL-MCNC: 1.19 MG/DL (ref 0.5–1.4)
CREAT SERPL-MCNC: 1.32 MG/DL (ref 0.5–1.4)
EOSINOPHIL # BLD AUTO: 0.28 K/UL (ref 0–0.51)
EOSINOPHIL NFR BLD: 3.9 % (ref 0–6.9)
ERYTHROCYTE [DISTWIDTH] IN BLOOD BY AUTOMATED COUNT: 61.7 FL (ref 35.9–50)
GLOBULIN SER CALC-MCNC: 3.2 G/DL (ref 1.9–3.5)
GLOBULIN SER CALC-MCNC: 3.2 G/DL (ref 1.9–3.5)
GLUCOSE SERPL-MCNC: 102 MG/DL (ref 65–99)
GLUCOSE SERPL-MCNC: 131 MG/DL (ref 65–99)
HCT VFR BLD AUTO: 40.5 % (ref 37–47)
HGB BLD-MCNC: 12.3 G/DL (ref 12–16)
IMM GRANULOCYTES # BLD AUTO: 0.05 K/UL (ref 0–0.11)
IMM GRANULOCYTES NFR BLD AUTO: 0.7 % (ref 0–0.9)
LYMPHOCYTES # BLD AUTO: 1.16 K/UL (ref 1–4.8)
LYMPHOCYTES NFR BLD: 16.3 % (ref 22–41)
MCH RBC QN AUTO: 27.2 PG (ref 27–33)
MCHC RBC AUTO-ENTMCNC: 30.4 G/DL (ref 33.6–35)
MCV RBC AUTO: 89.6 FL (ref 81.4–97.8)
MONOCYTES # BLD AUTO: 0.75 K/UL (ref 0–0.85)
MONOCYTES NFR BLD AUTO: 10.5 % (ref 0–13.4)
NEUTROPHILS # BLD AUTO: 4.84 K/UL (ref 2–7.15)
NEUTROPHILS NFR BLD: 67.9 % (ref 44–72)
NRBC # BLD AUTO: 0 K/UL
NRBC BLD-RTO: 0 /100 WBC
PLATELET # BLD AUTO: 279 K/UL (ref 164–446)
PMV BLD AUTO: 9.9 FL (ref 9–12.9)
POTASSIUM SERPL-SCNC: 4.1 MMOL/L (ref 3.6–5.5)
POTASSIUM SERPL-SCNC: 4.3 MMOL/L (ref 3.6–5.5)
PROT SERPL-MCNC: 6.9 G/DL (ref 6–8.2)
PROT SERPL-MCNC: 6.9 G/DL (ref 6–8.2)
RBC # BLD AUTO: 4.52 M/UL (ref 4.2–5.4)
SODIUM SERPL-SCNC: 132 MMOL/L (ref 135–145)
SODIUM SERPL-SCNC: 134 MMOL/L (ref 135–145)
WBC # BLD AUTO: 7.1 K/UL (ref 4.8–10.8)

## 2021-01-26 PROCEDURE — A4212 NON CORING NEEDLE OR STYLET: HCPCS

## 2021-01-26 PROCEDURE — 80053 COMPREHEN METABOLIC PANEL: CPT

## 2021-01-26 PROCEDURE — 700111 HCHG RX REV CODE 636 W/ 250 OVERRIDE (IP): Performed by: INTERNAL MEDICINE

## 2021-01-26 PROCEDURE — 82378 CARCINOEMBRYONIC ANTIGEN: CPT

## 2021-01-26 PROCEDURE — 96374 THER/PROPH/DIAG INJ IV PUSH: CPT | Mod: XU

## 2021-01-26 PROCEDURE — 85025 COMPLETE CBC W/AUTO DIFF WBC: CPT

## 2021-01-26 PROCEDURE — 99214 OFFICE O/P EST MOD 30 MIN: CPT | Performed by: NURSE PRACTITIONER

## 2021-01-26 PROCEDURE — 36591 DRAW BLOOD OFF VENOUS DEVICE: CPT

## 2021-01-26 PROCEDURE — G0498 CHEMO EXTEND IV INFUS W/PUMP: HCPCS

## 2021-01-26 PROCEDURE — 96375 TX/PRO/DX INJ NEW DRUG ADDON: CPT

## 2021-01-26 PROCEDURE — 80053 COMPREHEN METABOLIC PANEL: CPT | Mod: 91

## 2021-01-26 RX ORDER — ONDANSETRON 8 MG/1
8 TABLET, ORALLY DISINTEGRATING ORAL
Status: CANCELLED | OUTPATIENT
Start: 2021-02-09

## 2021-01-26 RX ORDER — HEPARIN SODIUM (PORCINE) LOCK FLUSH IV SOLN 100 UNIT/ML 100 UNIT/ML
500 SOLUTION INTRAVENOUS PRN
Status: CANCELLED | OUTPATIENT
Start: 2021-02-11

## 2021-01-26 RX ORDER — DEXAMETHASONE SODIUM PHOSPHATE 4 MG/ML
8 INJECTION, SOLUTION INTRA-ARTICULAR; INTRALESIONAL; INTRAMUSCULAR; INTRAVENOUS; SOFT TISSUE ONCE
Status: COMPLETED | OUTPATIENT
Start: 2021-01-26 | End: 2021-01-26

## 2021-01-26 RX ORDER — PROCHLORPERAZINE MALEATE 10 MG
10 TABLET ORAL EVERY 6 HOURS PRN
Status: CANCELLED | OUTPATIENT
Start: 2021-02-09

## 2021-01-26 RX ORDER — DEXAMETHASONE SODIUM PHOSPHATE 4 MG/ML
8 INJECTION, SOLUTION INTRA-ARTICULAR; INTRALESIONAL; INTRAMUSCULAR; INTRAVENOUS; SOFT TISSUE ONCE
Status: CANCELLED | OUTPATIENT
Start: 2021-02-09 | End: 2021-02-09

## 2021-01-26 RX ORDER — ONDANSETRON 2 MG/ML
4 INJECTION INTRAMUSCULAR; INTRAVENOUS
Status: CANCELLED | OUTPATIENT
Start: 2021-02-09

## 2021-01-26 RX ORDER — DEXTROSE MONOHYDRATE 50 MG/ML
INJECTION, SOLUTION INTRAVENOUS CONTINUOUS
Status: CANCELLED | OUTPATIENT
Start: 2021-02-09

## 2021-01-26 RX ORDER — LIDOCAINE HYDROCHLORIDE 10 MG/ML
10 INJECTION, SOLUTION INFILTRATION; PERINEURAL ONCE
Status: CANCELLED | OUTPATIENT
Start: 2021-02-09 | End: 2021-02-09

## 2021-01-26 RX ORDER — HEPARIN SODIUM (PORCINE) LOCK FLUSH IV SOLN 100 UNIT/ML 100 UNIT/ML
500 SOLUTION INTRAVENOUS PRN
Status: CANCELLED | OUTPATIENT
Start: 2021-02-09

## 2021-01-26 RX ADMIN — FLUOROURACIL 3475 MG: 50 INJECTION, SOLUTION INTRAVENOUS at 09:33

## 2021-01-26 RX ADMIN — DEXAMETHASONE SODIUM PHOSPHATE 8 MG: 4 INJECTION, SOLUTION INTRA-ARTICULAR; INTRALESIONAL; INTRAMUSCULAR; INTRAVENOUS; SOFT TISSUE at 08:47

## 2021-01-26 ASSESSMENT — ENCOUNTER SYMPTOMS
CONSTIPATION: 0
WEIGHT LOSS: 0
VOMITING: 0
CHILLS: 0
DIARRHEA: 0
PALPITATIONS: 0
NAUSEA: 1
COUGH: 1
FEVER: 0
MYALGIAS: 0
DIZZINESS: 0
HEADACHES: 0
SHORTNESS OF BREATH: 1

## 2021-01-26 ASSESSMENT — FIBROSIS 4 INDEX
FIB4 SCORE: 2.27
FIB4 SCORE: 1.64
FIB4 SCORE: 2.27

## 2021-01-26 ASSESSMENT — PAIN SCALES - GENERAL: PAINLEVEL: NO PAIN

## 2021-01-26 NOTE — PROGRESS NOTES
"Pharmacy Chemotherapy Verification Note:    Patient Name: Karlene Walters      Dx: Colon Ca      Protocol: 5-FU +        *Dosing Reference*                                    -- 10/31/17: delete Leucovorin and 5-FU push d/t neutropenia per Dr. Hardy   Fluorouracil 2400 mg/m² continuous infusion over 46-48 hours                              -- 20% reduction (1920 mg/m²) to be continued starting C28 per C Alsop APRN   14 day cycles for 12 cycles (adjuvant) or until disease progression or unacceptable toxicity    NCCN Guidelines for Colon Cancer. V.2.2015.  Jay T, et al. Eur J Cancer.1999;35(9):1343-7.     Allergies:  Oxaliplatin; Codeine; Pcn [penicillins]; Sulfa drugs; and Tape     /69 Comment: transfered over from this morning.  Pulse (!) 104 Comment: transfered over from this morning.  Temp 36.4 °C (97.5 °F) (Temporal) Comment: transfered over from this morning. Comment (Src): transfered over from this morning.  Resp 18 Comment: transfered over from this morning.  Ht 1.63 m (5' 4.17\") Comment: transfered over from this morning.  Wt 72.4 kg (159 lb 9.8 oz) Comment: transfered over from this morning.  LMP  (LMP Unknown)   SpO2 100% Comment: transfered over from this morning.  BMI 27.25 kg/m²  Body surface area is 1.81 meters squared.     Labs from 1/26/21 reviewed - all within treatment parameters.     Drug Order   (Drug name, dose, route, IV Fluid & volume, frequency, number of doses) Cycle: 122 ([place] cycle 114)    Previous treatment: 1/12/21     Medication = Fluorouracil (5-FU)  Base Dose = 1920 mg/m²  Calc Dose: Base Dose x 1.81 m² = 3475 mg  Final Dose = 3475 mg  Route = CIVI via CADD pump  Fluid & Volume = 69.5 mL (+ 3 mL overfill = 72.5 mL)  Admin Duration = Over 46 hours (to run at 1.5 mL/hour)   via CADD pump       <10% difference, ok to treat with final written dose     By my signature below, I confirm this process was performed independently with the BSA and all final chemotherapy " dosing calculations congruent. I have reviewed the above chemotherapy order and that my calculation of the final dose and BSA (when applicable) corroborate those calculations of the  pharmacist.     Tiffanie Olivier, PharmD, BCOP

## 2021-01-26 NOTE — PROGRESS NOTES
Pt presented to [facility] for Cycle 114 Fluorouricil 46 hour home infusion pump connection.  Pt's port accessed at earlier appointment today. Results reviewed, within treatable parameters. Alk Phos noted to be elevated at 418. ANDREW Krishnan saw patient prior to [facility] appointment. She requested that patient have her CMP re-drawn, but not to wait for results to start treatment.   Pt's left chest wall port accessed at previous appointment. Flushed easily, paolo briskly.  CMP drawn and sent to lab. Pre-med given, then Fluorouracil pump connected. Pt very knowledgeable in use.   Pt left [facility] in NAD. Aware to return in 46 hours for pump disconnection.

## 2021-01-26 NOTE — PROGRESS NOTES
Pt arrived ambulatory to IS for port access/labs.  POC discussed.  L chest port in place with EMLA cream, accessed in sterile field.  Brisk blood return noted.  Labs drawn as ordered and sent for processing.  Port left accessed for return use.  Pt discharged from IS in NAD under self care.

## 2021-01-26 NOTE — PROGRESS NOTES
Chemotherapy Verification - PRIMARY RN      Height = 163 cm  Weight = 72.4 kg  BSA = 1.81 m2       Medication: Fluorouracil  Dose: 1,920 mg/m2    Calculated Dose: 3,476 mg                             (In mg/m2, AUC, mg/kg)     I confirm this process was performed independently with the BSA and all final chemotherapy dosing calculations congruent.  Any discrepancies of 10% or greater have been addressed with the chemotherapy pharmacist. The resolution of the discrepancy has been documented in the EPIC progress notes.

## 2021-01-26 NOTE — PROGRESS NOTES
Subjective:      Karlene Walters is a 80 y.o. 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 She presents unaccompanied for today's visit.  Patient is scheduled to receive cycle #122 ([facility] #114) single agent 5-FU which she continues on every 2-week interval.      Cancer History  Patient with a long history of rectal carcinoma initially diagnosed in 2012.  She was found to have metastatic disease to liver and lung in 2015.  She is status post liver ablation as well as has received Y 90 in the past.  She was on XELOX initially but had allergic reaction to oxaliplatin (last dose 7/28/15) and was switched to single agent 5-FU. Patient initially on 5-FU at 2400 mg/m2 with Leucovorin and 5-FU push starting on 10/27/15, followed by 20% dose reduction to 1920 mg/m2 for cycle 18 (7/5/16), followed by deletion of Leucovorin and 5-FU push on 10/31/17. In January 2020 patient was changed to every 3-week cycle at cycle #99 ([facility] #91) and then changed to every 4-week interval in May 2020.  It was at that time there was noted to have growth of disease within the liver and was seen by Dr. West, surgeon.  Patient did undergo surgery in hopes of resection of the tumor but it was felt to be too close to the hilum and therefore she did undergo ablation instead on 5/28/20.  She has been back on single agent 5-FU at every 2-week interval, restarted on 6/16/20. Last CT 10/06/20 showed the tumor ablation cavities within the right lobe of the liver have decreased in size since previous examination.  There is a 1.5 x 3.1 cm focal area of enhancement adjacent to the ablation cavity that appears to have increased concerning for possible tumor recurrence.  Based on that finding she was sent for an MRI for further evaluation on 10/8/2020 which confirmed that the appearance of the masses noted in the  "liver did not have an appearance for suspicious finding for recurrent metastatic disease.  MRI of the abdomen completed on 1/11/2021 showed changes consistent with a partial right hepatectomy.  The ablation cavity showed no enhancement, alteration of diffusion signal or change, and therefore the reading radiologist stated this is noted to be a complete response to therapy.  MRI does also note a very stable 2.2 x 2.1 cm enhancing right cardiophrenic angle mass.  Questionable whether this is either adenopathy versus less likely a pulmonary parenchymal mass.  Patient does follow-up with pulmonary scheduled in May with a repeat CT chest at that time.  Per surgeon and Dr. Steele we will continue to monitor patient with a follow-up MRI in 3 months.  We will continue on current treatment as planned.      Interval History  Clinically patient is doing very well.  She is scheduled for treatment today.  She continues to have same side effect such as fatigue and pain proximately 1-2 days after treatment.  She does have heart palpitations at that time as well but otherwise no significant chest pain or heart palpitations.  She does take Lasix as needed but has not required in the last 2-4 weeks.  She did have some cramping in her hands and in her legs, therefore she took an extra dose of potassium this week.  She does use Zofran as needed for nausea.    Patient did confirm that she received dose number one of the COVID-19 vaccine (Moderna) on 1/19/2021 and tolerated it well.  Her next dose is due on 2/16/2021.    Allergies   Allergen Reactions   • Oxaliplatin Anaphylaxis   • Codeine      \"gets drunk\"   • Pcn [Penicillins] Itching     itching   • Sulfa Drugs Itching     itching   • Tape Rash     PAPER TAPE OK     Current Outpatient Medications on File Prior to Visit   Medication Sig Dispense Refill   • Tiotropium Bromide-Olodaterol (STIOLTO RESPIMAT) 2.5-2.5 MCG/ACT Aero Soln Take 2 Puffs by mouth every day. 2 Each 0   • furosemide " (LASIX) 40 MG Tab      • potassium chloride ER (KLOR-CON) 10 MEQ tablet Take 1 Tab by mouth every day. 100 Tab 3   • rivaroxaban (XARELTO) 10 MG Tab tablet Take 1 Tab by mouth every day. 30 Tab 5   • ondansetron (ZOFRAN) 4 MG Tab tablet Take 1 Tab by mouth every four hours as needed. 30 Tab 5   • Tiotropium Bromide-Olodaterol (STIOLTO RESPIMAT) 2.5-2.5 MCG/ACT Aero Soln Take 2 Puffs by mouth every day. 1 Inhaler 11   • albuterol 108 (90 Base) MCG/ACT Aero Soln inhalation aerosol Inhale 2 Puffs by mouth every 6 hours as needed for Shortness of Breath. 8.5 g 11   • metronidazole (METROCREAM) 0.75 % cream Apply 1 Each to affected area(s) 2 times a day.     • cyanocobalamin (VITAMIN B-12) 500 MCG Tab Take 1,000 mcg by mouth every day.     • Multiple Vitamins-Minerals (CENTRUM SILVER PO) Take 1 Tab by mouth every day.     • loratadine (CLARITIN) 10 MG TABS Take 10 mg by mouth every day. Indications: Hayfever     • lidocaine-prilocaine (EMLA) 2.5-2.5 % Cream Apply to port 1 hour prior to access of port and cover with plastic wrap. 30 g 3   • Cholecalciferol (VITAMIN D3) 400 UNITS CAPS Take 1 Cap by mouth every day.       Current Facility-Administered Medications on File Prior to Visit   Medication Dose Route Frequency Provider Last Rate Last Admin   • fluorouracil (ADRUCIL) 3,475 mg in CADD Cassette/Bag Chemo infusion (for use in CADD PUMP)  1,920 mg/m2 Intravenous Once Lucho Steele M.D.   3,475 mg at 01/26/21 0933         Review of Systems   Constitutional: Positive for malaise/fatigue (chronic and stable). Negative for chills, fever and weight loss.   Respiratory: Positive for cough (stable with significant previous work-up) and shortness of breath (with activity - chronic and stable).    Cardiovascular: Negative for chest pain and palpitations (only noted post therapy that does resolve).   Gastrointestinal: Positive for nausea (resolves with Zofran as needed). Negative for constipation, diarrhea and vomiting.         "Ostomy output stable   Genitourinary: Negative for dysuria.   Musculoskeletal: Negative for myalgias.   Skin:        Very dry hands from chemo - stable and not worsening   Neurological: Negative for dizziness and headaches.          Objective:     /78   Pulse (!) 103   Temp 36.7 °C (98 °F) (Temporal)   Resp 19   Ht 1.702 m (5' 7\")   Wt 72.2 kg (159 lb 2.8 oz)   LMP  (LMP Unknown)   SpO2 99%   BMI 24.93 kg/m²      Physical Exam  Vitals signs reviewed.   Constitutional:       General: She is not in acute distress.     Appearance: Normal appearance. She is not diaphoretic.   HENT:      Head: Normocephalic and atraumatic.   Cardiovascular:      Rate and Rhythm: Regular rhythm. Tachycardia present.      Pulses: Normal pulses.      Heart sounds: Normal heart sounds. No murmur. No friction rub. No gallop.    Pulmonary:      Effort: Pulmonary effort is normal. No respiratory distress.      Breath sounds: Normal breath sounds. No wheezing.   Abdominal:      General: Bowel sounds are normal. There is no distension.      Palpations: Abdomen is soft.      Tenderness: There is no abdominal tenderness.   Musculoskeletal: Normal range of motion.         General: No swelling or tenderness.   Skin:     General: Skin is warm and dry.   Neurological:      Mental Status: She is alert and oriented to person, place, and time.   Psychiatric:         Mood and Affect: Mood normal.         Behavior: Behavior normal.       Results for KANG HALL (MRN [id number]) as of 1/26/2021 09:39   Ref. Range 1/26/2021 07:00   WBC Latest Ref Range: 4.8 - 10.8 K/uL 7.1   RBC Latest Ref Range: 4.20 - 5.40 M/uL 4.52   Hemoglobin Latest Ref Range: 12.0 - 16.0 g/dL 12.3   Hematocrit Latest Ref Range: 37.0 - 47.0 % 40.5   MCV Latest Ref Range: 81.4 - 97.8 fL 89.6   MCH Latest Ref Range: 27.0 - 33.0 pg 27.2   MCHC Latest Ref Range: 33.6 - 35.0 g/dL 30.4 (L)   RDW Latest Ref Range: 35.9 - 50.0 fL 61.7 (H)   Platelet Count Latest Ref Range: 164 " - 446 K/uL 279   MPV Latest Ref Range: 9.0 - 12.9 fL 9.9   Neutrophils-Polys Latest Ref Range: 44.00 - 72.00 % 67.90   Neutrophils (Absolute) Latest Ref Range: 2.00 - 7.15 K/uL 4.84   Lymphocytes Latest Ref Range: 22.00 - 41.00 % 16.30 (L)   Lymphs (Absolute) Latest Ref Range: 1.00 - 4.80 K/uL 1.16   Monocytes Latest Ref Range: 0.00 - 13.40 % 10.50   Monos (Absolute) Latest Ref Range: 0.00 - 0.85 K/uL 0.75   Eosinophils Latest Ref Range: 0.00 - 6.90 % 3.90   Eos (Absolute) Latest Ref Range: 0.00 - 0.51 K/uL 0.28   Basophils Latest Ref Range: 0.00 - 1.80 % 0.70   Baso (Absolute) Latest Ref Range: 0.00 - 0.12 K/uL 0.05   Immature Granulocytes Latest Ref Range: 0.00 - 0.90 % 0.70   Immature Granulocytes (abs) Latest Ref Range: 0.00 - 0.11 K/uL 0.05   Nucleated RBC Latest Units: /100 WBC 0.00   NRBC (Absolute) Latest Units: K/uL 0.00   Sodium Latest Ref Range: 135 - 145 mmol/L 134 (L)   Potassium Latest Ref Range: 3.6 - 5.5 mmol/L 4.1   Chloride Latest Ref Range: 96 - 112 mmol/L 102   Co2 Latest Ref Range: 20 - 33 mmol/L 20   Anion Gap Latest Ref Range: 7.0 - 16.0  12.0   Glucose Latest Ref Range: 65 - 99 mg/dL 131 (H)   Bun Latest Ref Range: 8 - 22 mg/dL 15   Creatinine Latest Ref Range: 0.50 - 1.40 mg/dL 1.32   GFR If  Latest Ref Range: >60 mL/min/1.73 m 2 47 (A)   GFR If Non  Latest Ref Range: >60 mL/min/1.73 m 2 39 (A)   Calcium Latest Ref Range: 8.5 - 10.5 mg/dL 9.4   AST(SGOT) Latest Ref Range: 12 - 45 U/L 57 (H)   ALT(SGPT) Latest Ref Range: 2 - 50 U/L 52 (H)   Alkaline Phosphatase Latest Ref Range: 30 - 99 U/L 418 (H)   Total Bilirubin Latest Ref Range: 0.1 - 1.5 mg/dL 0.5   Albumin Latest Ref Range: 3.2 - 4.9 g/dL 3.7   Total Protein Latest Ref Range: 6.0 - 8.2 g/dL 6.9   Globulin Latest Ref Range: 1.9 - 3.5 g/dL 3.2   A-G Ratio Latest Units: g/dL 1.2   Carcinoembryonic Antigen Latest Ref Range: 0.0 - 3.0 ng/mL 1.8       REPEAT LIVER FUNCTIONS    Results for KANG HALL  (MRN [id number])    Ref. Range 1/26/2021 08:40   AST(SGOT) Latest Ref Range: 12 - 45 U/L 49 (H)   ALT(SGPT) Latest Ref Range: 2 - 50 U/L 49   Alkaline Phosphatase Latest Ref Range: 30 - 99 U/L 412 (H)   Total Bilirubin Latest Ref Range: 0.1 - 1.5 mg/dL 0.5          Assessment/Plan:        1. Malignant neoplasm of rectum (HCC)     2. Secondary malignant neoplasm of liver (HCC)         Patient with metastatic colorectal cancer to liver currently on single agent 5-FU.  She is scheduled to proceed with treatment today and I did review her CBC and CMP and labs are appropriate to proceed with treatment as planned.  Patient does note to have elevated liver functions, with an alkaline phosphatase of 418.  I have asked that they repeat the CMP, and they continue to be elevated.  I did discuss with Dr. Steele and we will go ahead and proceed with treatment today as planned and continue to monitor her liver functions closely.  She has been having her labs wax and wane over the last few months, and her most recent MRI showed no significant abnormal findings.  I also noticed that her GFR was slightly low, and likely related to lack of hydration.  Encourage patient to increase her oral hydration which she is aware of.    Plan will be to continue to monitor patient and treat on single agent 5-FU.  Repeat MRI in 3 months is scheduled per surgeon recommendation.    Patient to continue follow-up in the clinic in 2 weeks or sooner if needed.

## 2021-01-26 NOTE — PROGRESS NOTES
"Pharmacy Chemotherapy Verification Note:    Patient Name: Karlene Walters      Dx: Colon Ca        Cycle 122 ([facility] cycle 114)     Previous treatment: 1/12/21    Protocol: 5-FU + Leucovorin      followed by     7/5/16: Dose reduced by 20% to 320 mg/m² starting with Cycle 18 ([facility] #11)  due to neutropenia     10/31/17: delete Leucovorin and 5-FU push d/t neutropenia per Dr. Hardy   Fluorouracil continuous infusion over 46-48 hours     7/5/16: Infusion Dose reduced by 20% starting with Cycle 18 ([facility] #11)  due to neutropenia    20% reduction (1920 mg/m²) to be continued starting C28 per C Alsop APRN    14 day cycles x12 cycles (adjuvant) or until progression/toxicity  NCCN Guidelines for Colon Cancer. V.2.2015.  Jay GROVER, et al. Eur J Cancer.1999;35(9):1343-7.     Allergies:  Oxaliplatin; Codeine; Pcn [penicillins]; Sulfa drugs; and Tape     /69 Comment: transfered over from this morning.  Pulse (!) 104 Comment: transfered over from this morning.  Temp 36.4 °C (97.5 °F) (Temporal) Comment: transfered over from this morning. Comment (Src): transfered over from this morning.  Resp 18 Comment: transfered over from this morning.  Ht 1.63 m (5' 4.17\") Comment: transfered over from this morning.  Wt 72.4 kg (159 lb 9.8 oz) Comment: transfered over from this morning.  LMP  (LMP Unknown)   SpO2 100% Comment: transfered over from this morning.  BMI 27.25 kg/m²  Body surface area is 1.81 meters squared.    Labs: 1/26/21  Meet treatment parameters    Fluorouracil (5-FU) 1920 mg/m² x 1.81 m² =  3475.2 mg   Okay to treat with final dose = 3475 mg CIVI over 46 hrs                   "

## 2021-01-26 NOTE — PROGRESS NOTES
Chemotherapy Verification - SECONDARY RN       Height = 170.2 cm  Weight = 72.2 kg  BSA = 1.85 m2       Medication: Adrucil  Dose: 1,920 mg/m2  Calculated Dose: 3,552 mg over 46 hours                             (In mg/m2, AUC, mg/kg)         I confirm that this process was performed independently.

## 2021-01-27 ENCOUNTER — OFFICE VISIT (OUTPATIENT)
Dept: MEDICAL GROUP | Facility: MEDICAL CENTER | Age: 81
End: 2021-01-27
Payer: MEDICARE

## 2021-01-27 VITALS
OXYGEN SATURATION: 97 % | HEIGHT: 67 IN | RESPIRATION RATE: 16 BRPM | HEART RATE: 97 BPM | SYSTOLIC BLOOD PRESSURE: 126 MMHG | DIASTOLIC BLOOD PRESSURE: 78 MMHG | BODY MASS INDEX: 25.43 KG/M2 | TEMPERATURE: 98.7 F | WEIGHT: 162 LBS

## 2021-01-27 DIAGNOSIS — R74.8 ELEVATED ALKALINE PHOSPHATASE LEVEL: ICD-10-CM

## 2021-01-27 DIAGNOSIS — C78.7 SECONDARY MALIGNANT NEOPLASM OF LIVER (HCC): Chronic | ICD-10-CM

## 2021-01-27 DIAGNOSIS — R73.9 HYPERGLYCEMIA: ICD-10-CM

## 2021-01-27 DIAGNOSIS — Z86.711 HISTORY OF PULMONARY EMBOLUS (PE): ICD-10-CM

## 2021-01-27 DIAGNOSIS — Z00.00 ROUTINE GENERAL MEDICAL EXAMINATION AT A HEALTH CARE FACILITY: ICD-10-CM

## 2021-01-27 DIAGNOSIS — C20 MALIGNANT NEOPLASM OF RECTUM (HCC): Chronic | ICD-10-CM

## 2021-01-27 DIAGNOSIS — T45.1X5A CHEMOTHERAPY-INDUCED NEUTROPENIA (HCC): ICD-10-CM

## 2021-01-27 DIAGNOSIS — Z79.899 HIGH RISK MEDICATION USE: ICD-10-CM

## 2021-01-27 DIAGNOSIS — E86.0 DEHYDRATION: ICD-10-CM

## 2021-01-27 DIAGNOSIS — G25.81 RESTLESS LEGS: ICD-10-CM

## 2021-01-27 DIAGNOSIS — R06.02 SHORTNESS OF BREATH: ICD-10-CM

## 2021-01-27 DIAGNOSIS — Z79.01 CURRENT USE OF LONG TERM ANTICOAGULATION: Chronic | ICD-10-CM

## 2021-01-27 DIAGNOSIS — E78.5 HYPERLIPIDEMIA, UNSPECIFIED HYPERLIPIDEMIA TYPE: ICD-10-CM

## 2021-01-27 DIAGNOSIS — I10 ESSENTIAL HYPERTENSION: ICD-10-CM

## 2021-01-27 DIAGNOSIS — N28.9 RENAL INSUFFICIENCY: ICD-10-CM

## 2021-01-27 DIAGNOSIS — D70.1 CHEMOTHERAPY-INDUCED NEUTROPENIA (HCC): ICD-10-CM

## 2021-01-27 PROCEDURE — 99214 OFFICE O/P EST MOD 30 MIN: CPT | Performed by: INTERNAL MEDICINE

## 2021-01-27 ASSESSMENT — FIBROSIS 4 INDEX: FIB4 SCORE: 2.01

## 2021-01-27 ASSESSMENT — ENCOUNTER SYMPTOMS: GENERAL WELL-BEING: GOOD

## 2021-01-27 ASSESSMENT — ACTIVITIES OF DAILY LIVING (ADL): BATHING_REQUIRES_ASSISTANCE: 0

## 2021-01-27 ASSESSMENT — PATIENT HEALTH QUESTIONNAIRE - PHQ9: CLINICAL INTERPRETATION OF PHQ2 SCORE: 0

## 2021-01-27 NOTE — ASSESSMENT & PLAN NOTE
Malignant neoplasm of of the rectum being treated with chemotherapy every 2 weeks.  She thinks everything is fairly stable.

## 2021-01-27 NOTE — ASSESSMENT & PLAN NOTE
She is anticoagulated with Xarelto for prior pulmonary emboli.  She denies unusual bleeding or bruising.

## 2021-01-27 NOTE — ASSESSMENT & PLAN NOTE
She has had some trouble with dehydration.  Most recent GFR is 39.  She was strongly encouraged to keep well-hydrated.

## 2021-01-27 NOTE — ASSESSMENT & PLAN NOTE
She has hypertension that is controlled primarily with low-salt diet and Lasix.  She denies significant lightheadedness or headaches.

## 2021-01-27 NOTE — ASSESSMENT & PLAN NOTE
Secondary malignant neoplasm of liver is essentially unchanged recently she reports.  She continues her chemotherapy through Dr. Steele.  There is apparently been no significant change in the liver except that her alkaline phosphatase has gone up to 418.  She denies significant pain in the area.  She will be getting a repeat PET scan in the near future.

## 2021-01-27 NOTE — PROGRESS NOTES
Chief Complaint   Patient presents with   • Annual Exam         HPI:  Karlene is a 80 y.o. here for Medicare Annual Wellness Visit        Patient Active Problem List    Diagnosis Date Noted   • History of pulmonary embolus (PE) 08/18/2015     Priority: High   • Secondary malignant neoplasm of liver (HCC) 07/06/2015     Priority: High   • Hypertension 10/01/2012     Priority: Medium   • High risk medication use 09/23/2020   • Incomplete rectal prolapse 05/19/2020   • Restless legs 05/06/2020   • Liver disease 09/10/2019   • Current use of long term anticoagulation 06/04/2019   • Elevated alkaline phosphatase level 11/15/2017   • Travel advice encounter 11/15/2017   • Localized edema 10/04/2017   • Uncontrolled pain 06/15/2017   • Chemotherapy-induced neutropenia (HCC) 09/28/2016   • Decreased GFR 08/16/2016   • Routine general medical examination at a health care facility 03/14/2016   • Renal insufficiency 03/14/2016   • Hyperglycemia 03/14/2016   • Shortness of breath 10/05/2015   • Hyperlipidemia 09/17/2015   • Lightheadedness 09/17/2015   • Ileitis 08/24/2015   • Diarrhea 08/24/2015   • Dehydration 08/14/2015   • Thyroid nodule 12/19/2014   • Abnormal EKG 08/12/2013   • Anemia 10/01/2012   • Malignant neoplasm of rectum (HCC) 09/26/2012       Current Outpatient Medications   Medication Sig Dispense Refill   • furosemide (LASIX) 40 MG Tab      • potassium chloride ER (KLOR-CON) 10 MEQ tablet Take 1 Tab by mouth every day. 100 Tab 3   • rivaroxaban (XARELTO) 10 MG Tab tablet Take 1 Tab by mouth every day. 30 Tab 5   • ondansetron (ZOFRAN) 4 MG Tab tablet Take 1 Tab by mouth every four hours as needed. 30 Tab 5   • lidocaine-prilocaine (EMLA) 2.5-2.5 % Cream Apply to port 1 hour prior to access of port and cover with plastic wrap. 30 g 3   • Tiotropium Bromide-Olodaterol (STIOLTO RESPIMAT) 2.5-2.5 MCG/ACT Aero Soln Take 2 Puffs by mouth every day. 1 Inhaler 11   • albuterol 108 (90 Base) MCG/ACT Aero Soln inhalation  aerosol Inhale 2 Puffs by mouth every 6 hours as needed for Shortness of Breath. 8.5 g 11   • metronidazole (METROCREAM) 0.75 % cream Apply 1 Each to affected area(s) 2 times a day.     • cyanocobalamin (VITAMIN B-12) 500 MCG Tab Take 1,000 mcg by mouth every day.     • Multiple Vitamins-Minerals (CENTRUM SILVER PO) Take 1 Tab by mouth every day.     • Cholecalciferol (VITAMIN D3) 400 UNITS CAPS Take 1 Cap by mouth every day.     • loratadine (CLARITIN) 10 MG TABS Take 10 mg by mouth every day. Indications: Hayfever     • Tiotropium Bromide-Olodaterol (STIOLTO RESPIMAT) 2.5-2.5 MCG/ACT Aero Soln Take 2 Puffs by mouth every day. 2 Each 0     No current facility-administered medications for this visit.         Patient is taking medications as noted in medication list.  Current supplements as per medication list.     Allergies: Oxaliplatin, Codeine, Pcn [penicillins], Sulfa drugs, and Tape    Current social contact/activities: Read     Is patient current with immunizations? Yes.    She  reports that she has never smoked. She has never used smokeless tobacco. She reports that she does not drink alcohol or use drugs.  Counseling given: Not Answered        DPA/Advanced directive: Patient does not have an Advanced Directive.  A packet and workshop information was given on Advanced Directives.    ROS:    Gait: Uses no assistive device   Ostomy: No   Other tubes: Yes   Amputations: No   Chronic oxygen use No   Last eye exam 8/2020  Wears hearing aids: No   : Denies any urinary leakage during the last 6 months      Screening:        Depression Screening    Little interest or pleasure in doing things?  0 - not at all  Feeling down, depressed, or hopeless? 0 - not at all  Patient Health Questionnaire Score: 0    If depressive symptoms identified deferred to follow up visit unless specifically addressed in assessment and plan.    Interpretation of PHQ-9 Total Score   Score Severity   1-4 No Depression   5-9 Mild Depression    10-14 Moderate Depression   15-19 Moderately Severe Depression   20-27 Severe Depression    Screening for Cognitive Impairment    Three Minute Recall (river, renea, finger)  3/3    Kleber clock face with all 12 numbers and set the hands to show 10 past 11.  Yes 5/5 11:10  If cognitive concerns identified, deferred for follow up unless specifically addressed in assessment and plan.    Fall Risk Assessment    Has the patient had two or more falls in the last year or any fall with injury in the last year?  No  If fall risk identified, deferred for follow up unless specifically addressed in assessment and plan.    Safety Assessment    Throw rugs on floor.  No  Handrails on all stairs.  No  Good lighting in all hallways.  Yes  Difficulty hearing.  No  Patient counseled about all safety risks that were identified.    Functional Assessment ADLs    Are there any barriers preventing you from cooking for yourself or meeting nutritional needs?  No.    Are there any barriers preventing you from driving safely or obtaining transportation?  No.    Are there any barriers preventing you from using a telephone or calling for help?  No.    Are there any barriers preventing you from shopping?  No.    Are there any barriers preventing you from taking care of your own finances?  No.    Are there any barriers preventing you from managing your medications?  No.    Are there any barriers preventing you from showering, bathing or dressing yourself?  No.    Are you currently engaging in any exercise or physical activity?  Yes.     What is your perception of your health?  Good.    Health Maintenance Summary                IMM DTaP/Tdap/Td Vaccine Overdue 4/13/1959     BONE DENSITY Overdue 4/13/2005     IMM ZOSTER VACCINES Overdue 3/11/2015      Done 1/14/2015 Imm Admin: Zoster Vaccine Live (ZVL) (Zostavax) - HISTORICAL DATA    COVID-19 Vaccine Next Due 2/16/2021      Done 1/19/2021 Ext Imm: COVID-19 mRNA (MOD)    Annual Wellness Visit Next  Due 2/26/2021      Done 2/26/2020      Patient has more history with this topic...    Annual Pulmonary Function Test / Spirometry Next Due 12/2/2021      Done 12/2/2020 PULMONARY FUNCTION TESTS     Patient has more history with this topic...    COLONOSCOPY Next Due 8/23/2025      Done 8/23/2015 Surg:COLONOSCOPY WITH BIOPSY     Patient has more history with this topic...          Patient Care Team:  Manan Quiñonez M.D. as PCP - General (Internal Medicine)  Kit West M.D. as Consulting Physician (Surgery)  ARTURO Krishnan as Consulting Physician (Oncology)  Kolton Montgomery M.D. as Consulting Physician (Surgery)  Ahsan Haque M.D. as Consulting Physician (Cardiology)  Justin Zaidi M.D. as Consulting Physician (Ophthalmology)  Shruti Cortez as Respiratory Therapist  Lucho Steele M.D. as Consulting Physician (Oncology)    Social History     Tobacco Use   • Smoking status: Never Smoker   • Smokeless tobacco: Never Used   Substance Use Topics   • Alcohol use: No   • Drug use: No     Family History   Problem Relation Age of Onset   • Heart Disease Mother    • Heart Failure Mother    • Hypertension Mother    • Hyperlipidemia Mother    • Cancer Mother    • Cancer Maternal Aunt 60        breast ca   • Lung Disease Brother         COPD/Emphysema/Smoker   • Cancer Brother         prostate cancer   • Alcohol/Drug Brother         Alcohol   • Kidney Disease Father         renal failure     She  has a past medical history of Adverse effect of anesthesia, Anesthesia, Arrhythmia, Blood clotting disorder (HCC) (08/2015), Blood transfusion (1957), Breath shortness, Cancer (HCC) (09/2012), CATARACT, Chemotherapy-induced neutropenia (HCC) (9/28/2016), Chickenpox, Colon cancer (HCC), Dental disorder, Elevated alkaline phosphatase level (11/15/2017), Emphysema of lung (HCC), Fall, Swedish measles, Heart murmur, Hemorrhagic disorder (HCC), High cholesterol, Hypercholesteremia, Hypertension,  Ileostomy in place (HCC), Ileostomy in place (HCC), Indigestion, Influenza, Lightheadedness (9/17/2015), Mumps, Obstruction, Other specified symptom associated with female genital organs, Pneumonia (05/2017), Pulmonary embolism (HCC), 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   Past Surgical History:   Procedure Laterality Date   • HEPATIC ABLATION  5/28/2020    Procedure: ABLATION, LESION, LIVER-TRISEGMENTECTOMY, MICROWAVE ABLATION, INTRA OPERATIVE ULTRA SOUND, SIMPLE RESECTED / REPAIR OF DIAPHRAGM;  Surgeon: Kit West M.D.;  Location: SURGERY [facility];  Service: General   • HEPATIC ABLATION LAPAROSCOPIC  11/15/2018    Procedure: HEPATIC ABLATION LAPAROSCOPIC.- SEGMENT 7/8;  Surgeon: Kit West M.D.;  Location: SURGERY [facility];  Service: General   • COLONOSCOPY WITH BIOPSY  8/23/2015    Procedure: COLONOSCOPY WITH BIOPSY;  Surgeon: Wilbur Glasgow M.D.;  Location: ENDOSCOPY [facility];  Service:    • HEPATIC ABLATION LAPAROSCOPIC  7/6/2015    Procedure: HEPATIC ABLATION LAPAROSCOPIC.;  Surgeon: Kit West M.D.;  Location: SURGERY [facility];  Service:    • CATH PLACEMENT Left 7/6/2015    Procedure: CATH PLACEMENT CEPHALIC POWERPORT;  Surgeon: Kit West M.D.;  Location: SURGERY [facility];  Service:    • FISTULA IN ANO REPAIR  1/28/2015    Performed by Kolton Montgomery M.D. at [facility]   • PERINEAL PROCEDURE  1/28/2015    Performed by Kolton Montgomery M.D. at [facility]   • FISTULA IN ANO REPAIR  10/15/2014    Performed by Kolton Montgomery M.D. at [facility]   • PERINEAL PROCEDURE  10/15/2014    Performed by Kolton Montgomery M.D. at [facility]   • IRRIGATION & DEBRIDEMENT GENERAL   "2/19/2014    Performed by Kolton Montgomery M.D. at SURGERY [facility]   • FLAP GRAFT  2/19/2014    Performed by Kolton Montgomery M.D. at [facility]   • PERINEAL PROCEDURE  12/18/2013    Performed by Kolton Montgomery M.D. at [facility]   • MYOCUTANEOUS FLAP  12/18/2013    Performed by Kolton Montgomery M.D. at [facility]   • IRRIGATION & DEBRIDEMENT GENERAL  10/23/2013    Performed by Kolton Montgomery M.D. at [facility]   • FISTULA IN ANO REPAIR  9/4/2013    Performed by Kolton Montgomery M.D. at [facility]   • FLAP ROTATION  9/4/2013    Performed by Kolton Montgomery M.D. at [facility]   • RECOVERY  8/26/2013    Performed by [facility] [place] SAME DAY [facility]   • BIOPSY GENERAL  7/17/2013    Performed by Kolton Montgomery M.D. at [facility]   • PROCTOSCOPY  7/17/2013    Performed by Kolton Montgomery M.D. at [facility]   • FISTULA IN ANO REPAIR  2/27/2013    Performed by Kolton Montgomery M.D. at [facility]   • SIGMOIDOSCOPY  2/27/2013    Performed by Kolton Montgomery M.D. at [facility]   • LAPAROSCOPY  10/8/2012    Performed by Kolton Montgomery M.D. at [facility]   • ILEO LOOP DIVERSION  10/8/2012    Performed by Kolton Montgomery M.D. at [facility]   • COLECTOMY  9/26/2012    Performed by Kolton Montgomery M.D. at [facility]   • COLONOSCOPY FLEX W/ENDO US  9/20/2012    Performed by Harshil Bolton M.D. at [facility]   • OTHER  2009    left eye torn retina repair   • CATARACT EXTRACTION WITH IOL  2004    bilateral   • ABDOMINAL HYSTERECTOMY TOTAL  1983   • CYST EXCISION  1972    ovarian   • COLON RESECTION     • EYE SURGERY      cataracts   • HYSTERECTOMY LAPAROSCOPY     • PB REMV 2ND CATARACT,CORN-SCLER SECTN     • TONSILLECTOMY             Exam:     /78 (BP Location: Left arm)   Pulse 97   Temp 37.1 °C (98.7 °F)   Resp 16   Ht 1.702 m (5' 7\")   Wt " 73.5 kg (162 lb)   SpO2 97%  Body mass index is 25.37 kg/m².    Hearing good.    Dentition upper and lower dentures  Alert, oriented in no acute distress.  Eye contact is good, speech goal directed, affect calm,  Neck is supple and without significant lymphadenopathy.  Lungs are clear without obvious rales or wheeze.  Cardiovascular peripheral circulation is satisfactory.  Heart sounds are unremarkable.  Abdomen is soft and without obvious masses or tenderness.  There are normal bowel sounds.  Extremities are without significant edema, cyanosis, or deformity.  Brief neurologic exam is unremarkable.      Assessment and Plan. The following treatment and monitoring plan is recommended:    Secondary malignant neoplasm of liver (HCC)  Secondary malignant neoplasm of liver is essentially unchanged recently she reports.  She continues her chemotherapy through Dr. Steele.  There is apparently been no significant change in the liver except that her alkaline phosphatase has gone up to 418.  She denies significant pain in the area.  She will be getting a repeat PET scan in the near future.    Malignant neoplasm of rectum (HCC)  Malignant neoplasm of of the rectum being treated with chemotherapy every 2 weeks.  She thinks everything is fairly stable.    Current use of long term anticoagulation  She is anticoagulated with Xarelto for prior pulmonary emboli.  She denies unusual bleeding or bruising.    History of pulmonary embolus (PE)  Prior pulmonary emboli for which the patient is anticoagulated with Xarelto.    Hypertension  She has hypertension that is controlled primarily with low-salt diet and Lasix.  She denies significant lightheadedness or headaches.    Dehydration  She has had some trouble with dehydration.  Most recent GFR is 39.  She was strongly encouraged to keep well-hydrated.    Hyperlipidemia  She has hyperlipidemia for which she is on appropriate diet.    Shortness of breath  She does have some chronic mild  shortness of breath that is unchanged recently.    Renal insufficiency  She has mild renal insufficiency with most recent GFR 39.  She was strongly encouraged to remain well-hydrated.  She reports that when she is home she drinks a lot of fluids but when she is traveling, she does not.  We discussed the importance of being well-hydrated.    Hyperglycemia  She does have mild hyperglycemia with most recent glucose 131.  We reviewed appropriate diet, avoiding concentrated sweets.    Chemotherapy-induced neutropenia  She does get chemotherapy every 2 weeks and is followed closely for her blood count.  No recent neutropenia.    Elevated alkaline phosphatase level  Alkaline phosphatase is definitely elevated at 418.  SGOT and SGPT are 57 and 52 respectively.  She will be getting a PET scan in the near future.  Other further evaluation after that.  She is not aware of any bone fractures.    Restless legs  Often after she gets her chemotherapy, she gets restless leg syndrome.  That is unchanged recently.    High risk medication use  She is on chemotherapy and she is anticoagulated with Xarelto.        Services suggested: No services needed at this time  Health Care Screening recommendations as per orders if indicated.  Referrals offered: PT/OT/Nutrition counseling/Behavioral Health/Smoking cessation as per orders if indicated.    Discussion today about general wellness and lifestyle habits:    · Prevent falls and reduce trip hazards; Cautioned about securing or removing rugs.  · Have a working fire alarm and carbon monoxide detector;   · Engage in regular physical activity and social activities       Follow-up:6 months if not sooner.  She will continue close follow-up with Dr. Steele.

## 2021-01-28 ENCOUNTER — OUTPATIENT INFUSION SERVICES (OUTPATIENT)
Dept: ONCOLOGY | Facility: MEDICAL CENTER | Age: 81
End: 2021-01-28
Attending: INTERNAL MEDICINE
Payer: MEDICARE

## 2021-01-28 VITALS
HEART RATE: 78 BPM | OXYGEN SATURATION: 98 % | RESPIRATION RATE: 18 BRPM | TEMPERATURE: 97.8 F | DIASTOLIC BLOOD PRESSURE: 58 MMHG | SYSTOLIC BLOOD PRESSURE: 141 MMHG

## 2021-01-28 DIAGNOSIS — C20 MALIGNANT NEOPLASM OF RECTUM (HCC): ICD-10-CM

## 2021-01-28 PROCEDURE — 700111 HCHG RX REV CODE 636 W/ 250 OVERRIDE (IP): Performed by: INTERNAL MEDICINE

## 2021-01-28 PROCEDURE — 96523 IRRIG DRUG DELIVERY DEVICE: CPT

## 2021-01-28 RX ORDER — HEPARIN SODIUM (PORCINE) LOCK FLUSH IV SOLN 100 UNIT/ML 100 UNIT/ML
500 SOLUTION INTRAVENOUS PRN
Status: DISCONTINUED | OUTPATIENT
Start: 2021-01-28 | End: 2021-01-28 | Stop reason: HOSPADM

## 2021-01-28 RX ADMIN — HEPARIN 500 UNITS: 100 SYRINGE at 11:03

## 2021-01-28 NOTE — ASSESSMENT & PLAN NOTE
Alkaline phosphatase is definitely elevated at 418.  SGOT and SGPT are 57 and 52 respectively.  She will be getting a PET scan in the near future.  Other further evaluation after that.  She is not aware of any bone fractures.

## 2021-01-28 NOTE — ASSESSMENT & PLAN NOTE
She has mild renal insufficiency with most recent GFR 39.  She was strongly encouraged to remain well-hydrated.  She reports that when she is home she drinks a lot of fluids but when she is traveling, she does not.  We discussed the importance of being well-hydrated.

## 2021-01-28 NOTE — ASSESSMENT & PLAN NOTE
She does have mild hyperglycemia with most recent glucose 131.  We reviewed appropriate diet, avoiding concentrated sweets.

## 2021-01-28 NOTE — ASSESSMENT & PLAN NOTE
Often after she gets her chemotherapy, she gets restless leg syndrome.  That is unchanged recently.

## 2021-01-28 NOTE — ASSESSMENT & PLAN NOTE
She does get chemotherapy every 2 weeks and is followed closely for her blood count.  No recent neutropenia.

## 2021-01-28 NOTE — PROGRESS NOTES
Pt returns for 5FU pump de-access. Pump settings verified at 0 ml. Port flushed, blood return observed. Pump removed and port heparin locked. Needle removed, tip intact, gauze dressing applied. Pt knows to return on Feb 9, time confirmed. Pt discharged home under self care in no apparent distress.

## 2021-02-02 ENCOUNTER — TELEPHONE (OUTPATIENT)
Dept: HEMATOLOGY ONCOLOGY | Facility: MEDICAL CENTER | Age: 81
End: 2021-02-02

## 2021-02-02 NOTE — TELEPHONE ENCOUNTER
"Rec'vd letter from [facility] stating that ondansetron is not covered by insurance.  Submitted prior auth via [employer] with a response that the medication is \"available without authorization.\"  Called pt's preferred pharmacy ([facility] in [place]) to clarify if Rx can be processed & dispensed without an auth.  Spoke with pharmacist at [facility] that stated that insurance covered pt's last Rx with a co-pay of 65 cents.  "

## 2021-02-03 NOTE — TELEPHONE ENCOUNTER
Called [employer] directly to clarify why letter was sent stating that ondansetron needed a PA, but when PA processed thru [employer] a response was given that auth was not needed.  [employer] rep researched the concern & reiterated that ondansetron 4mg (30 tabs for 30 days) does not need a PA.    Called pt to inform her above info & to contact RN if any further issues occur when trying to refill this Rx.  Pt voiced understanding & appreciative of assistance.

## 2021-02-09 ENCOUNTER — OUTPATIENT INFUSION SERVICES (OUTPATIENT)
Dept: ONCOLOGY | Facility: MEDICAL CENTER | Age: 81
End: 2021-02-09
Attending: INTERNAL MEDICINE
Payer: MEDICARE

## 2021-02-09 ENCOUNTER — OFFICE VISIT (OUTPATIENT)
Dept: HEMATOLOGY ONCOLOGY | Facility: MEDICAL CENTER | Age: 81
End: 2021-02-09
Payer: MEDICARE

## 2021-02-09 VITALS
OXYGEN SATURATION: 96 % | BODY MASS INDEX: 25.07 KG/M2 | TEMPERATURE: 98 F | WEIGHT: 159.72 LBS | RESPIRATION RATE: 19 BRPM | HEART RATE: 114 BPM | HEIGHT: 67 IN | DIASTOLIC BLOOD PRESSURE: 66 MMHG | SYSTOLIC BLOOD PRESSURE: 124 MMHG

## 2021-02-09 VITALS
RESPIRATION RATE: 18 BRPM | TEMPERATURE: 97 F | WEIGHT: 159.83 LBS | SYSTOLIC BLOOD PRESSURE: 138 MMHG | HEIGHT: 64 IN | BODY MASS INDEX: 27.29 KG/M2 | DIASTOLIC BLOOD PRESSURE: 67 MMHG | OXYGEN SATURATION: 99 % | HEART RATE: 114 BPM

## 2021-02-09 VITALS
HEIGHT: 64 IN | HEART RATE: 114 BPM | OXYGEN SATURATION: 99 % | DIASTOLIC BLOOD PRESSURE: 67 MMHG | TEMPERATURE: 97 F | BODY MASS INDEX: 27.29 KG/M2 | SYSTOLIC BLOOD PRESSURE: 138 MMHG | RESPIRATION RATE: 18 BRPM | WEIGHT: 159.83 LBS

## 2021-02-09 DIAGNOSIS — C20 MALIGNANT NEOPLASM OF RECTUM (HCC): ICD-10-CM

## 2021-02-09 DIAGNOSIS — Z86.711 HISTORY OF PULMONARY EMBOLUS (PE): ICD-10-CM

## 2021-02-09 DIAGNOSIS — G89.3 CANCER RELATED PAIN: ICD-10-CM

## 2021-02-09 DIAGNOSIS — C20 RECTAL CANCER (HCC): Chronic | ICD-10-CM

## 2021-02-09 DIAGNOSIS — Z78.0 ASYMPTOMATIC MENOPAUSAL STATE: ICD-10-CM

## 2021-02-09 DIAGNOSIS — C20 MALIGNANT NEOPLASM OF RECTUM (HCC): Chronic | ICD-10-CM

## 2021-02-09 DIAGNOSIS — C78.7 SECONDARY MALIGNANT NEOPLASM OF LIVER (HCC): Chronic | ICD-10-CM

## 2021-02-09 LAB
ALBUMIN SERPL BCP-MCNC: 3.5 G/DL (ref 3.2–4.9)
ALBUMIN/GLOB SERPL: 0.9 G/DL
ALP SERPL-CCNC: 316 U/L (ref 30–99)
ALT SERPL-CCNC: 27 U/L (ref 2–50)
ANION GAP SERPL CALC-SCNC: 14 MMOL/L (ref 7–16)
AST SERPL-CCNC: 35 U/L (ref 12–45)
BASOPHILS # BLD AUTO: 0.8 % (ref 0–1.8)
BASOPHILS # BLD: 0.07 K/UL (ref 0–0.12)
BILIRUB SERPL-MCNC: 0.6 MG/DL (ref 0.1–1.5)
BUN SERPL-MCNC: 20 MG/DL (ref 8–22)
CALCIUM SERPL-MCNC: 9.3 MG/DL (ref 8.5–10.5)
CEA SERPL-MCNC: 1.5 NG/ML (ref 0–3)
CHLORIDE SERPL-SCNC: 103 MMOL/L (ref 96–112)
CO2 SERPL-SCNC: 18 MMOL/L (ref 20–33)
CREAT SERPL-MCNC: 1.18 MG/DL (ref 0.5–1.4)
EOSINOPHIL # BLD AUTO: 0.31 K/UL (ref 0–0.51)
EOSINOPHIL NFR BLD: 3.4 % (ref 0–6.9)
ERYTHROCYTE [DISTWIDTH] IN BLOOD BY AUTOMATED COUNT: 60.4 FL (ref 35.9–50)
GLOBULIN SER CALC-MCNC: 3.7 G/DL (ref 1.9–3.5)
GLUCOSE SERPL-MCNC: 147 MG/DL (ref 65–99)
HCT VFR BLD AUTO: 40.8 % (ref 37–47)
HGB BLD-MCNC: 12.5 G/DL (ref 12–16)
IMM GRANULOCYTES # BLD AUTO: 0.06 K/UL (ref 0–0.11)
IMM GRANULOCYTES NFR BLD AUTO: 0.7 % (ref 0–0.9)
LYMPHOCYTES # BLD AUTO: 1.76 K/UL (ref 1–4.8)
LYMPHOCYTES NFR BLD: 19.1 % (ref 22–41)
MCH RBC QN AUTO: 27.5 PG (ref 27–33)
MCHC RBC AUTO-ENTMCNC: 30.6 G/DL (ref 33.6–35)
MCV RBC AUTO: 89.9 FL (ref 81.4–97.8)
MONOCYTES # BLD AUTO: 0.91 K/UL (ref 0–0.85)
MONOCYTES NFR BLD AUTO: 9.9 % (ref 0–13.4)
NEUTROPHILS # BLD AUTO: 6.09 K/UL (ref 2–7.15)
NEUTROPHILS NFR BLD: 66.1 % (ref 44–72)
NRBC # BLD AUTO: 0 K/UL
NRBC BLD-RTO: 0 /100 WBC
PLATELET # BLD AUTO: 342 K/UL (ref 164–446)
PMV BLD AUTO: 10.1 FL (ref 9–12.9)
POTASSIUM SERPL-SCNC: 4.1 MMOL/L (ref 3.6–5.5)
PROT SERPL-MCNC: 7.2 G/DL (ref 6–8.2)
RBC # BLD AUTO: 4.54 M/UL (ref 4.2–5.4)
SODIUM SERPL-SCNC: 135 MMOL/L (ref 135–145)
WBC # BLD AUTO: 9.2 K/UL (ref 4.8–10.8)

## 2021-02-09 PROCEDURE — 36591 DRAW BLOOD OFF VENOUS DEVICE: CPT

## 2021-02-09 PROCEDURE — 85025 COMPLETE CBC W/AUTO DIFF WBC: CPT

## 2021-02-09 PROCEDURE — 82378 CARCINOEMBRYONIC ANTIGEN: CPT

## 2021-02-09 PROCEDURE — 99214 OFFICE O/P EST MOD 30 MIN: CPT | Performed by: NURSE PRACTITIONER

## 2021-02-09 PROCEDURE — G0498 CHEMO EXTEND IV INFUS W/PUMP: HCPCS

## 2021-02-09 PROCEDURE — A4212 NON CORING NEEDLE OR STYLET: HCPCS

## 2021-02-09 PROCEDURE — 700111 HCHG RX REV CODE 636 W/ 250 OVERRIDE (IP): Performed by: INTERNAL MEDICINE

## 2021-02-09 PROCEDURE — 96374 THER/PROPH/DIAG INJ IV PUSH: CPT | Mod: XU

## 2021-02-09 PROCEDURE — 80053 COMPREHEN METABOLIC PANEL: CPT

## 2021-02-09 RX ORDER — OXYCODONE HYDROCHLORIDE 5 MG/1
5 TABLET ORAL EVERY 6 HOURS
Qty: 45 TAB | Refills: 0 | Status: SHIPPED | OUTPATIENT
Start: 2021-02-09 | End: 2021-03-11

## 2021-02-09 RX ORDER — DEXAMETHASONE SODIUM PHOSPHATE 4 MG/ML
8 INJECTION, SOLUTION INTRA-ARTICULAR; INTRALESIONAL; INTRAMUSCULAR; INTRAVENOUS; SOFT TISSUE ONCE
Status: COMPLETED | OUTPATIENT
Start: 2021-02-09 | End: 2021-02-09

## 2021-02-09 RX ADMIN — DEXAMETHASONE SODIUM PHOSPHATE 8 MG: 4 INJECTION, SOLUTION INTRAMUSCULAR; INTRAVENOUS at 09:52

## 2021-02-09 RX ADMIN — FLUOROURACIL 3475 MG: 50 INJECTION, SOLUTION INTRAVENOUS at 09:49

## 2021-02-09 ASSESSMENT — FIBROSIS 4 INDEX
FIB4 SCORE: 2.01
FIB4 SCORE: 1.58
FIB4 SCORE: 2.01

## 2021-02-09 ASSESSMENT — ENCOUNTER SYMPTOMS
SHORTNESS OF BREATH: 1
VOMITING: 0
ABDOMINAL PAIN: 1
NAUSEA: 1
PALPITATIONS: 0
FEVER: 0
CHILLS: 0
CONSTIPATION: 0
DIARRHEA: 1
WEIGHT LOSS: 0

## 2021-02-09 ASSESSMENT — PAIN SCALES - GENERAL: PAINLEVEL: NO PAIN

## 2021-02-09 NOTE — PROGRESS NOTES
Chemotherapy Verification - PRIMARY RN      Height = 163 cm  Weight = 72.5 kg  BSA = 1.81 m2       Medication: 5FU CADD  Dose: 1920 mg/m2  Calculated Dose: 3475 mg                             (In mg/m2, AUC, mg/kg)     I confirm this process was performed independently with the BSA and all final chemotherapy dosing calculations congruent.  Any discrepancies of 10% or greater have been addressed with the chemotherapy pharmacist. The resolution of the discrepancy has been documented in the EPIC progress notes.

## 2021-02-09 NOTE — PROGRESS NOTES
Pt returned after clinic visit for 5FU pump connect.  Lab results reviewed, pt meets parameters for treatment today.  Premedication given as ordered followed by pump connect for 46 hours home infusion.  Return appt confirmed.  Pt discharged from IS in NAD under self care.

## 2021-02-09 NOTE — PROGRESS NOTES
Pharmacy Chemotherapy Verification Note:    Patient Name: Karlene Walters      Dx: Colon Ca      Protocol: 5-FU +        *Dosing Reference*                                    -- 10/31/17: delete Leucovorin and 5-FU push d/t neutropenia per Dr. Hardy   Fluorouracil 2400 mg/m² continuous infusion over 46-48 hours                              -- 20% reduction (1920 mg/m²) to be continued starting C28 per C Alsop APRN   14 day cycles for 12 cycles (adjuvant) or until disease progression or unacceptable toxicity    NCCN Guidelines for Colon Cancer. V.2.2015.  Jay GROVER, et al. Eur J Cancer.1999;35(9):1343-7.     Allergies:  Oxaliplatin; Codeine; Pcn [penicillins]; Sulfa drugs; and Tape     LMP  (LMP Unknown)  There is no height or weight on file to calculate BSA.     Labs from 2/9/21 reviewed - all within treatment parameters.     Drug Order   (Drug name, dose, route, IV Fluid & volume, frequency, number of doses) Cycle: 123 ([place] cycle 115)    Previous treatment: 1/12/21     Medication = Fluorouracil (5-FU)  Base Dose = 1920 mg/m²  Calc Dose: Base Dose x 1.81 m² = 3475.2 mg  Final Dose = 3475 mg per MD prder  Route = CIVI via CADD pump  Fluid & Volume = 69.5 mL (+ 3 mL overfill = 72.5 mL)  Admin Duration = Over 46 hours (to run at 1.5 mL/hour)   via CADD pump       <10% difference, ok to treat with final written dose

## 2021-02-09 NOTE — PROGRESS NOTES
"Pharmacy Chemotherapy Verification Note:    Patient Name: Karlene Walters      Dx: Colon Ca        Cycle 123 ([place] cycle 115)     Previous treatment: 12/1/20    Protocol: 5-FU + Leucovorin      followed by     7/5/16: Dose reduced by 20% to 320 mg/m² starting with Cycle 18 (Moro #11)  due to neutropenia     10/31/17: delete Leucovorin and 5-FU push d/t neutropenia per Dr. Hardy   Fluorouracil continuous infusion over 46-48 hours     7/5/16: Infusion Dose reduced by 20% starting with Cycle 18 ([place]oro #11)  due to neutropenia    20% reduction (1920 mg/m²) to be continued starting C28 per C Alsop APRN   14 day cycles for 12 cycles (adjuvant) or until disease progression or unacceptable toxicity  NCCN Guidelines for Colon Cancer. V.2.2015.  Jay GROVER, et al. Eur J Cancer.1999;35(9):1343-7.     Allergies:  Oxaliplatin; Codeine; Pcn [penicillins]; Sulfa drugs; and Tape     /67   Pulse (!) 114   Temp 36.1 °C (97 °F) (Temporal)   Resp 18   Ht 1.63 m (5' 4.17\")   Wt 72.5 kg (159 lb 13.3 oz)   LMP  (LMP Unknown)   SpO2 99%   BMI 27.29 kg/m²  Body surface area is 1.81 meters squared.    Labs from 2/9/21 reviewed - all within treatment parameters.       Fluorouracil (5-FU) 1920 mg/m² x 1.81m² =  3475mg   <10% difference, okay to treat with final dose = 3475mg CIVI over 46 hrs                 Nakia Arroyo, PharmD      "

## 2021-02-09 NOTE — PROGRESS NOTES
Chemotherapy Verification - SECONDARY RN       Height = 163 cm  Weight = 72.5 kg  BSA = 1.81 m2       Medication: Adrucil  Dose: 1,920 mg/m2  Calculated Dose: 3,475.2 mg over 46 hours                             (In mg/m2, AUC, mg/kg)         I confirm that this process was performed independently.

## 2021-02-10 ENCOUNTER — APPOINTMENT (RX ONLY)
Dept: URBAN - METROPOLITAN AREA CLINIC 4 | Facility: CLINIC | Age: 81
Setting detail: DERMATOLOGY
End: 2021-02-10

## 2021-02-10 DIAGNOSIS — D18.0 HEMANGIOMA: ICD-10-CM

## 2021-02-10 DIAGNOSIS — L82.1 OTHER SEBORRHEIC KERATOSIS: ICD-10-CM

## 2021-02-10 DIAGNOSIS — D22 MELANOCYTIC NEVI: ICD-10-CM

## 2021-02-10 DIAGNOSIS — L81.4 OTHER MELANIN HYPERPIGMENTATION: ICD-10-CM

## 2021-02-10 DIAGNOSIS — L57.0 ACTINIC KERATOSIS: ICD-10-CM

## 2021-02-10 DIAGNOSIS — Z71.89 OTHER SPECIFIED COUNSELING: ICD-10-CM

## 2021-02-10 DIAGNOSIS — L71.8 OTHER ROSACEA: ICD-10-CM

## 2021-02-10 PROBLEM — D22.9 MELANOCYTIC NEVI, UNSPECIFIED: Status: ACTIVE | Noted: 2021-02-10

## 2021-02-10 PROBLEM — D18.01 HEMANGIOMA OF SKIN AND SUBCUTANEOUS TISSUE: Status: ACTIVE | Noted: 2021-02-10

## 2021-02-10 PROCEDURE — ? LIQUID NITROGEN

## 2021-02-10 PROCEDURE — ? COUNSELING

## 2021-02-10 PROCEDURE — 99213 OFFICE O/P EST LOW 20 MIN: CPT | Mod: 25

## 2021-02-10 PROCEDURE — 17000 DESTRUCT PREMALG LESION: CPT

## 2021-02-10 PROCEDURE — ? SUNSCREEN RECOMMENDATIONS

## 2021-02-10 ASSESSMENT — LOCATION SIMPLE DESCRIPTION DERM
LOCATION SIMPLE: LEFT FOREHEAD
LOCATION SIMPLE: NOSE
LOCATION SIMPLE: LEFT LIP

## 2021-02-10 ASSESSMENT — LOCATION DETAILED DESCRIPTION DERM
LOCATION DETAILED: LEFT LATERAL FOREHEAD
LOCATION DETAILED: NASAL SUPRATIP
LOCATION DETAILED: LEFT INFERIOR VERMILION LIP

## 2021-02-10 ASSESSMENT — LOCATION ZONE DERM
LOCATION ZONE: NOSE
LOCATION ZONE: FACE
LOCATION ZONE: LIP

## 2021-02-10 NOTE — PROCEDURE: COUNSELING
Detail Level: Zone
Detail Level: Detailed
Patient Specific Counseling (Will Not Stick From Patient To Patient): Patient is to use the metronidazole twice a day until it is under control and then is to switch to once a day.

## 2021-02-10 NOTE — PROCEDURE: LIQUID NITROGEN
Number Of Freeze-Thaw Cycles: 2 freeze-thaw cycles
Render Note In Bullet Format When Appropriate: No
Duration Of Freeze Thaw-Cycle (Seconds): 2
Consent: The patient's consent was obtained including but not limited to risks of crusting, scabbing, blistering, scarring, darker or lighter pigmentary change, recurrence, incomplete removal and infection.
Detail Level: Simple
Post-Care Instructions: I reviewed with the patient in detail post-care instructions. Patient is to wear sunprotection, and avoid picking at any of the treated lesions. Pt may apply Vaseline to crusted or scabbing areas.

## 2021-02-11 ENCOUNTER — OUTPATIENT INFUSION SERVICES (OUTPATIENT)
Dept: ONCOLOGY | Facility: MEDICAL CENTER | Age: 81
End: 2021-02-11
Attending: INTERNAL MEDICINE
Payer: MEDICARE

## 2021-02-11 VITALS
BODY MASS INDEX: 27.14 KG/M2 | TEMPERATURE: 98 F | HEART RATE: 96 BPM | SYSTOLIC BLOOD PRESSURE: 145 MMHG | OXYGEN SATURATION: 98 % | HEIGHT: 64 IN | RESPIRATION RATE: 18 BRPM | DIASTOLIC BLOOD PRESSURE: 74 MMHG | WEIGHT: 158.95 LBS

## 2021-02-11 DIAGNOSIS — C20 MALIGNANT NEOPLASM OF RECTUM (HCC): Chronic | ICD-10-CM

## 2021-02-11 PROCEDURE — 96523 IRRIG DRUG DELIVERY DEVICE: CPT

## 2021-02-11 PROCEDURE — 700111 HCHG RX REV CODE 636 W/ 250 OVERRIDE (IP)

## 2021-02-11 RX ORDER — HEPARIN SODIUM (PORCINE) LOCK FLUSH IV SOLN 100 UNIT/ML 100 UNIT/ML
SOLUTION INTRAVENOUS
Status: COMPLETED
Start: 2021-02-11 | End: 2021-02-11

## 2021-02-11 RX ADMIN — HEPARIN 500 UNITS: 100 SYRINGE at 11:18

## 2021-02-11 ASSESSMENT — FIBROSIS 4 INDEX: FIB4 SCORE: 1.58

## 2021-02-11 NOTE — PROGRESS NOTES
Pt returns to IS for 5FU CADD pump disconnect.  Pt reports nausea and generalized pain 4/10 today.  Pt will take oxycodone for pain on the ride home.  5FU CADD pump volume reads 0ml.  Pump disconnected from pt.  Port flushed with NS and brisk blood return observed.  Port flushed again with NS and heparin locked.  Meade needle removed intact.  Site covered with gauze.  Pt is aware of next appt time.  Pt dc home in stable condition.

## 2021-02-17 RX ORDER — DEXTROSE MONOHYDRATE 50 MG/ML
INJECTION, SOLUTION INTRAVENOUS CONTINUOUS
Status: CANCELLED | OUTPATIENT
Start: 2021-02-23

## 2021-02-17 RX ORDER — ONDANSETRON 8 MG/1
8 TABLET, ORALLY DISINTEGRATING ORAL
Status: CANCELLED | OUTPATIENT
Start: 2021-02-23

## 2021-02-17 RX ORDER — PROCHLORPERAZINE MALEATE 10 MG
10 TABLET ORAL EVERY 6 HOURS PRN
Status: CANCELLED | OUTPATIENT
Start: 2021-02-23

## 2021-02-17 RX ORDER — LIDOCAINE HYDROCHLORIDE 10 MG/ML
10 INJECTION, SOLUTION INFILTRATION; PERINEURAL ONCE
Status: CANCELLED | OUTPATIENT
Start: 2021-02-23 | End: 2021-02-23

## 2021-02-17 RX ORDER — DEXAMETHASONE SODIUM PHOSPHATE 4 MG/ML
8 INJECTION, SOLUTION INTRA-ARTICULAR; INTRALESIONAL; INTRAMUSCULAR; INTRAVENOUS; SOFT TISSUE ONCE
Status: CANCELLED | OUTPATIENT
Start: 2021-02-23 | End: 2021-02-24

## 2021-02-17 RX ORDER — ONDANSETRON 2 MG/ML
4 INJECTION INTRAMUSCULAR; INTRAVENOUS
Status: CANCELLED | OUTPATIENT
Start: 2021-02-23

## 2021-02-17 RX ORDER — HEPARIN SODIUM (PORCINE) LOCK FLUSH IV SOLN 100 UNIT/ML 100 UNIT/ML
500 SOLUTION INTRAVENOUS PRN
Status: CANCELLED | OUTPATIENT
Start: 2021-02-25

## 2021-02-17 RX ORDER — HEPARIN SODIUM (PORCINE) LOCK FLUSH IV SOLN 100 UNIT/ML 100 UNIT/ML
500 SOLUTION INTRAVENOUS PRN
Status: CANCELLED | OUTPATIENT
Start: 2021-02-23

## 2021-02-22 ENCOUNTER — HOSPITAL ENCOUNTER (OUTPATIENT)
Dept: RADIOLOGY | Facility: MEDICAL CENTER | Age: 81
End: 2021-02-22
Attending: NURSE PRACTITIONER
Payer: MEDICARE

## 2021-02-22 DIAGNOSIS — C20 MALIGNANT NEOPLASM OF RECTUM (HCC): ICD-10-CM

## 2021-02-22 DIAGNOSIS — Z78.0 ASYMPTOMATIC MENOPAUSAL STATE: ICD-10-CM

## 2021-02-22 DIAGNOSIS — C78.7 SECONDARY MALIGNANT NEOPLASM OF LIVER (HCC): ICD-10-CM

## 2021-02-22 PROCEDURE — 77080 DXA BONE DENSITY AXIAL: CPT

## 2021-02-23 ENCOUNTER — OUTPATIENT INFUSION SERVICES (OUTPATIENT)
Dept: ONCOLOGY | Facility: MEDICAL CENTER | Age: 81
End: 2021-02-23
Attending: INTERNAL MEDICINE
Payer: MEDICARE

## 2021-02-23 ENCOUNTER — OFFICE VISIT (OUTPATIENT)
Dept: HEMATOLOGY ONCOLOGY | Facility: MEDICAL CENTER | Age: 81
End: 2021-02-23
Payer: MEDICARE

## 2021-02-23 VITALS
DIASTOLIC BLOOD PRESSURE: 63 MMHG | TEMPERATURE: 97.5 F | WEIGHT: 158.95 LBS | SYSTOLIC BLOOD PRESSURE: 140 MMHG | BODY MASS INDEX: 27.14 KG/M2 | RESPIRATION RATE: 18 BRPM | HEIGHT: 64 IN | HEART RATE: 102 BPM | OXYGEN SATURATION: 99 %

## 2021-02-23 VITALS
TEMPERATURE: 98.3 F | OXYGEN SATURATION: 99 % | SYSTOLIC BLOOD PRESSURE: 132 MMHG | BODY MASS INDEX: 27.12 KG/M2 | HEIGHT: 64 IN | DIASTOLIC BLOOD PRESSURE: 68 MMHG | RESPIRATION RATE: 16 BRPM | WEIGHT: 158.84 LBS | HEART RATE: 102 BPM

## 2021-02-23 VITALS — WEIGHT: 158.95 LBS | HEIGHT: 64 IN | BODY MASS INDEX: 27.14 KG/M2

## 2021-02-23 DIAGNOSIS — C20 MALIGNANT NEOPLASM OF RECTUM (HCC): ICD-10-CM

## 2021-02-23 DIAGNOSIS — Z86.711 HISTORY OF PULMONARY EMBOLUS (PE): ICD-10-CM

## 2021-02-23 DIAGNOSIS — C78.7 SECONDARY MALIGNANT NEOPLASM OF LIVER (HCC): ICD-10-CM

## 2021-02-23 DIAGNOSIS — G89.3 CANCER RELATED PAIN: ICD-10-CM

## 2021-02-23 LAB
ALBUMIN SERPL BCP-MCNC: 3.5 G/DL (ref 3.2–4.9)
ALBUMIN/GLOB SERPL: 1.1 G/DL
ALP SERPL-CCNC: 264 U/L (ref 30–99)
ALT SERPL-CCNC: 28 U/L (ref 2–50)
ANION GAP SERPL CALC-SCNC: 14 MMOL/L (ref 7–16)
AST SERPL-CCNC: 26 U/L (ref 12–45)
BASOPHILS # BLD AUTO: 0.7 % (ref 0–1.8)
BASOPHILS # BLD: 0.06 K/UL (ref 0–0.12)
BILIRUB SERPL-MCNC: 0.4 MG/DL (ref 0.1–1.5)
BUN SERPL-MCNC: 17 MG/DL (ref 8–22)
CALCIUM SERPL-MCNC: 9.3 MG/DL (ref 8.5–10.5)
CEA SERPL-MCNC: 1.4 NG/ML (ref 0–3)
CHLORIDE SERPL-SCNC: 102 MMOL/L (ref 96–112)
CO2 SERPL-SCNC: 19 MMOL/L (ref 20–33)
CREAT SERPL-MCNC: 0.92 MG/DL (ref 0.5–1.4)
EOSINOPHIL # BLD AUTO: 0.44 K/UL (ref 0–0.51)
EOSINOPHIL NFR BLD: 5 % (ref 0–6.9)
ERYTHROCYTE [DISTWIDTH] IN BLOOD BY AUTOMATED COUNT: 58 FL (ref 35.9–50)
GLOBULIN SER CALC-MCNC: 3.3 G/DL (ref 1.9–3.5)
GLUCOSE SERPL-MCNC: 114 MG/DL (ref 65–99)
HCT VFR BLD AUTO: 37.9 % (ref 37–47)
HGB BLD-MCNC: 11.8 G/DL (ref 12–16)
IMM GRANULOCYTES # BLD AUTO: 0.06 K/UL (ref 0–0.11)
IMM GRANULOCYTES NFR BLD AUTO: 0.7 % (ref 0–0.9)
LYMPHOCYTES # BLD AUTO: 1.49 K/UL (ref 1–4.8)
LYMPHOCYTES NFR BLD: 16.8 % (ref 22–41)
MCH RBC QN AUTO: 27.3 PG (ref 27–33)
MCHC RBC AUTO-ENTMCNC: 31.1 G/DL (ref 33.6–35)
MCV RBC AUTO: 87.5 FL (ref 81.4–97.8)
MONOCYTES # BLD AUTO: 0.84 K/UL (ref 0–0.85)
MONOCYTES NFR BLD AUTO: 9.5 % (ref 0–13.4)
NEUTROPHILS # BLD AUTO: 5.98 K/UL (ref 2–7.15)
NEUTROPHILS NFR BLD: 67.3 % (ref 44–72)
NRBC # BLD AUTO: 0 K/UL
NRBC BLD-RTO: 0 /100 WBC
PLATELET # BLD AUTO: 273 K/UL (ref 164–446)
PMV BLD AUTO: 10.1 FL (ref 9–12.9)
POTASSIUM SERPL-SCNC: 3.6 MMOL/L (ref 3.6–5.5)
PROT SERPL-MCNC: 6.8 G/DL (ref 6–8.2)
RBC # BLD AUTO: 4.33 M/UL (ref 4.2–5.4)
SODIUM SERPL-SCNC: 135 MMOL/L (ref 135–145)
WBC # BLD AUTO: 8.9 K/UL (ref 4.8–10.8)

## 2021-02-23 PROCEDURE — 82378 CARCINOEMBRYONIC ANTIGEN: CPT

## 2021-02-23 PROCEDURE — 700111 HCHG RX REV CODE 636 W/ 250 OVERRIDE (IP): Performed by: NURSE PRACTITIONER

## 2021-02-23 PROCEDURE — A4212 NON CORING NEEDLE OR STYLET: HCPCS

## 2021-02-23 PROCEDURE — G0498 CHEMO EXTEND IV INFUS W/PUMP: HCPCS

## 2021-02-23 PROCEDURE — 700111 HCHG RX REV CODE 636 W/ 250 OVERRIDE (IP)

## 2021-02-23 PROCEDURE — 99214 OFFICE O/P EST MOD 30 MIN: CPT | Performed by: NURSE PRACTITIONER

## 2021-02-23 PROCEDURE — 96374 THER/PROPH/DIAG INJ IV PUSH: CPT | Mod: XU

## 2021-02-23 PROCEDURE — 80053 COMPREHEN METABOLIC PANEL: CPT

## 2021-02-23 PROCEDURE — 85025 COMPLETE CBC W/AUTO DIFF WBC: CPT

## 2021-02-23 RX ORDER — HEPARIN SODIUM (PORCINE) LOCK FLUSH IV SOLN 100 UNIT/ML 100 UNIT/ML
SOLUTION INTRAVENOUS
Status: COMPLETED
Start: 2021-02-23 | End: 2021-02-23

## 2021-02-23 RX ORDER — DEXAMETHASONE SODIUM PHOSPHATE 4 MG/ML
8 INJECTION, SOLUTION INTRA-ARTICULAR; INTRALESIONAL; INTRAMUSCULAR; INTRAVENOUS; SOFT TISSUE ONCE
Status: COMPLETED | OUTPATIENT
Start: 2021-02-23 | End: 2021-02-23

## 2021-02-23 RX ADMIN — HEPARIN 5 ML: 100 SYRINGE at 07:15

## 2021-02-23 RX ADMIN — FLUOROURACIL 3475 MG: 50 INJECTION, SOLUTION INTRAVENOUS at 09:01

## 2021-02-23 RX ADMIN — DEXAMETHASONE SODIUM PHOSPHATE 8 MG: 4 INJECTION, SOLUTION INTRA-ARTICULAR; INTRALESIONAL; INTRAMUSCULAR; INTRAVENOUS; SOFT TISSUE at 08:15

## 2021-02-23 ASSESSMENT — FIBROSIS 4 INDEX
FIB4 SCORE: 1.58
FIB4 SCORE: 1.44
FIB4 SCORE: 1.58

## 2021-02-23 ASSESSMENT — ENCOUNTER SYMPTOMS
PALPITATIONS: 0
FEVER: 0
CHILLS: 0
DIZZINESS: 0
DIARRHEA: 0
VOMITING: 0
HEADACHES: 0
WHEEZING: 0
NAUSEA: 0
SHORTNESS OF BREATH: 1
WEIGHT LOSS: 0
CONSTIPATION: 0
COUGH: 0

## 2021-02-23 NOTE — PROGRESS NOTES
"Pharmacy Chemotherapy Verification Note:    Patient Name: Karlene Walters      Dx: Colon Ca        Cycle 124 ([place] cycle 116)     Previous treatment: 2/9/21    Protocol: 5-FU + Leucovorin      followed by     7/5/16: Dose reduced by 20% to 320 mg/m² starting with Cycle 18 ([place] #11)  due to neutropenia     10/31/17: delete Leucovorin and 5-FU push d/t neutropenia per Dr. Hardy   Fluorouracil continuous infusion over 46-48 hours     7/5/16: Infusion Dose reduced by 20% starting with Cycle 18 ([place] #11)  due to neutropenia    20% reduction (1920 mg/m²) to be continued starting C28 per C Alsop APRN   14 day cycles for 12 cycles (adjuvant) or until disease progression or unacceptable toxicity  NCCN Guidelines for Colon Cancer. V.2.2015.  Jay GROVER, et al. Eur J Cancer.1999;35(9):1343-7.     Allergies:  Oxaliplatin; Codeine; Pcn [penicillins]; Sulfa drugs; and Tape     Ht 1.63 m (5' 4.17\")   Wt 72.1 kg (158 lb 15.2 oz)   LMP  (LMP Unknown)   BMI 27.14 kg/m²  Body surface area is 1.81 meters squared.    Labs from 2/23/21 reviewed - all within treatment parameters.       Fluorouracil (5-FU) 1920 mg/m² x 1.81m² =  3475mg   <10% difference, okay to treat with final dose = 3475mg CIVI over 46 hrs                 Nakia Arroyo, PharmD      "

## 2021-02-23 NOTE — PROGRESS NOTES
Patient presents for lab draw prior to chemotherapy later this morning. Port accessed using sterile technique, flushes well with blood drawn as ordered. Port flushed per protocol and left in place. Patient returns at 0800 and released in no acute distress.

## 2021-02-23 NOTE — PROGRESS NOTES
Patient presents for day one cycle 116 [place]. Patient with no new complaints. Port flushes easily with brisk blood return. Patient connected to home infusion pump with no new complaints. Patient returns 2/25/2021 and released in no acute distress.

## 2021-02-23 NOTE — PROGRESS NOTES
Chemotherapy Verification - SECONDARY RN       Height = 163 cm  Weight = 72 kg  BSA = 1.805 m2       Medication: home infusion 5FU  Dose: 1920 mg/m2  dr  Calculated Dose: 3466.6 mg                             (In mg/m2, AUC, mg/kg)       I confirm that this process was performed independently.

## 2021-02-23 NOTE — PROGRESS NOTES
"Pharmacy Chemotherapy Verification    Patient Name: Karlene Walters      Dx: Colon Ca      Protocol: 5-FU +        *Dosing Reference*                                    -- 10/31/17: delete Leucovorin and 5-FU push d/t neutropenia per Dr. Hardy   Fluorouracil 2400 mg/m² continuous infusion over 46-48 hours                              --20% reduction (1920 mg/m²) to be continued starting C28 per C Alsop APRN   14 day cycles for 12 cycles (adjuvant) or until disease progression or unacceptable toxicity  NCCN Guidelines for Colon Cancer. V.2.2015.  Jay T, et al. Eur J Cancer.1999;35(9):1343-7.     Allergies:  Oxaliplatin; Codeine; Pcn [penicillins]; Sulfa drugs; and Tape     Ht 1.63 m (5' 4.17\")   Wt 72.1 kg (158 lb 15.2 oz)   LMP  (LMP Unknown)   BMI 27.14 kg/m²  Body surface area is 1.81 meters squared.     Labs 2/23/21  ANC 5980 Hgb 11.8 Plt 273k  SCr 0.92 CrCl 54.7 mL/min   AST/ALT/AP = 26/28/264 Tbili = 0.2     Drug Order   (Drug name, dose, route, IV Fluid & volume, frequency, number of doses) Cycle: 124 ([place] cycle 116)    Previous treatment: 2/9/21     Medication = Fluorouracil (5-FU)  Base Dose = 1920 mg/m²  Calc Dose: Base Dose x 1.81 m² = 3475.2 mg  Final Dose = 3475 mg   Route = CIVI via CADD pump  Fluid & Volume = 69.5 mL (+ 3 mL overfill = 72.5 mL)  Admin Duration = Over 46 hours @ 1.5 mL/hour   via CADD pump       <10% difference, ok to treat with final written dose     Lora Hernandez, PharmD, BCOP    "

## 2021-02-23 NOTE — PROGRESS NOTES
Chemotherapy Verification - PRIMARY RN      Height = 163 cm  Weight = 72 kg  BSA = 1.81 m2       Medication: Adrucil  Dose: 1,920 mg/m2  Calculated Dose: 3,475.2 mg over 46 hours                             (In mg/m2, AUC, mg/kg)         I confirm this process was performed independently with the BSA and all final chemotherapy dosing calculations congruent.  Any discrepancies of 10% or greater have been addressed with the chemotherapy pharmacist. The resolution of the discrepancy has been documented in the EPIC progress notes.

## 2021-02-23 NOTE — PROGRESS NOTES
Subjective:      Karlene Walters is a 80 y.o. female who presents for pre-chemo for metastatic rectal cancer.           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 She presents unaccompanied for today's visit.  Patient is scheduled to receive cycle #124 ([place] #116) single agent 5-FU which she continues on every 2-week interval.      Cancer History  Patient with a long history of rectal carcinoma initially diagnosed in 2012.  She was found to have metastatic disease to liver and lung in 2015.  She is status post liver ablation as well as has received Y 90 in the past.  She was on XELOX initially but had allergic reaction to oxaliplatin (last dose 7/28/15) and was switched to single agent 5-FU. Patient initially on 5-FU at 2400 mg/m2 with Leucovorin and 5-FU push starting on 10/27/15, followed by 20% dose reduction to 1920 mg/m2 for cycle 18 (7/5/16), followed by deletion of Leucovorin and 5-FU push on 10/31/17. In January 2020 patient was changed to every 3-week cycle at cycle #99 ([place] #91) and then changed to every 4-week interval in May 2020.  It was at that time there was noted to have growth of disease within the liver and was seen by Dr. West, surgeon.  Patient did undergo surgery in hopes of resection of the tumor but it was felt to be too close to the hilum and therefore she did undergo ablation instead on 5/28/20.  She has been back on single agent 5-FU at every 2-week interval, restarted on 6/16/20. Last CT 10/06/20 showed the tumor ablation cavities within the right lobe of the liver have decreased in size since previous examination.  There is a 1.5 x 3.1 cm focal area of enhancement adjacent to the ablation cavity that appears to have increased concerning for possible tumor recurrence.  Based on that finding she was sent for an MRI for further evaluation on 10/8/2020 which confirmed that the appearance of the masses noted in the liver did not have an  appearance for suspicious finding for recurrent metastatic disease.  MRI of the abdomen completed on 1/11/2021 showed changes consistent with a partial right hepatectomy.  The ablation cavity showed no enhancement, alteration of diffusion signal or change, and therefore the reading radiologist stated this is noted to be a complete response to therapy.  MRI does also note a very stable 2.2 x 2.1 cm enhancing right cardiophrenic angle mass.  Questionable whether this is either adenopathy versus less likely a pulmonary parenchymal mass.  Patient does follow-up with pulmonary scheduled in May with a repeat CT chest at that time.  Per surgeon and Dr. Steele we will continue to monitor patient with a follow-up MRI in 3 months.  We will continue on current treatment as planned.      Interval History  Patient continues to be very stable.  She stated that fatigue is minimal at this time.  She did complete her second dose of her Covid vaccine and tolerated very well.  She does note some nausea at times but it is controlled very well with Zofran.  Her cancer-related pain is minimal as well, however she does tend to have mild pain for approximately 2-3 days following her infusion.  This is relieved very well with oxycodone.  Appetite has been well, and her output via her ostomy has been stable.    Patient had noticed some changes in her urinary output and some difficulty urination previously.  However she stated that she has recently started on new inhaler, Stiolto, and did not see any changes to her respiratory status.  Therefore she stopped this inhaler and stated all of her abnormal symptoms she had been experiencing previously had completely stopped.  She feels much better being off of this inhaler.  She uses her albuterol only as needed which is very minimally.    Request that patient undergo a bone mineral density test which she did complete and I did review with her today.  Bone marrow density shows normal density within  "the lumbar spine and left femur.    Allergies   Allergen Reactions   • Oxaliplatin Anaphylaxis   • Codeine      \"gets drunk\"   • Pcn [Penicillins] Itching     itching   • Sulfa Drugs Itching     itching   • Tape Rash     PAPER TAPE OK     Current Outpatient Medications on File Prior to Visit   Medication Sig Dispense Refill   • oxyCODONE immediate-release (ROXICODONE) 5 MG Tab Take 1 Tab by mouth every 6 hours for 30 days. 45 Tab 0   • rivaroxaban (XARELTO) 10 MG Tab tablet Take 1 Tab by mouth every day. 30 Tab 5   • furosemide (LASIX) 40 MG Tab      • potassium chloride ER (KLOR-CON) 10 MEQ tablet Take 1 Tab by mouth every day. 100 Tab 3   • ondansetron (ZOFRAN) 4 MG Tab tablet Take 1 Tab by mouth every four hours as needed. 30 Tab 5   • lidocaine-prilocaine (EMLA) 2.5-2.5 % Cream Apply to port 1 hour prior to access of port and cover with plastic wrap. 30 g 3   • albuterol 108 (90 Base) MCG/ACT Aero Soln inhalation aerosol Inhale 2 Puffs by mouth every 6 hours as needed for Shortness of Breath. 8.5 g 11   • metronidazole (METROCREAM) 0.75 % cream Apply 1 Each to affected area(s) 2 times a day.     • cyanocobalamin (VITAMIN B-12) 500 MCG Tab Take 1,000 mcg by mouth every day.     • Multiple Vitamins-Minerals (CENTRUM SILVER PO) Take 1 Tab by mouth every day.     • Cholecalciferol (VITAMIN D3) 400 UNITS CAPS Take 1 Cap by mouth every day.     • loratadine (CLARITIN) 10 MG TABS Take 10 mg by mouth every day. Indications: Hayfever     • Tiotropium Bromide-Olodaterol (STIOLTO RESPIMAT) 2.5-2.5 MCG/ACT Aero Soln Take 2 Puffs by mouth every day. (Patient not taking: Reported on 2/9/2021) 2 Each 0   • Tiotropium Bromide-Olodaterol (STIOLTO RESPIMAT) 2.5-2.5 MCG/ACT Aero Soln Take 2 Puffs by mouth every day. (Patient not taking: Reported on 2/9/2021) 1 Inhaler 11     No current facility-administered medications on file prior to visit.       Review of Systems   Constitutional: Negative for chills, fever, malaise/fatigue and " "weight loss.   Respiratory: Positive for shortness of breath (chronic). Negative for cough and wheezing.    Cardiovascular: Negative for chest pain and palpitations.   Gastrointestinal: Negative for constipation, diarrhea, nausea (minimal nausea this pst 2 weeks - zofran as needed) and vomiting.   Genitourinary: Negative for dysuria.        Urination improved once d/c inhaler Stiolto   Skin: Negative for itching and rash.        Cracking and peeling of hands and feet from 5FU   Neurological: Negative for dizziness and headaches.          Objective:     /68   Pulse (!) 102   Temp 36.8 °C (98.3 °F) (Temporal)   Resp 16   Ht 1.63 m (5' 4.17\")   Wt 72 kg (158 lb 13.5 oz)   LMP  (LMP Unknown)   SpO2 99%   BMI 27.12 kg/m²      Physical Exam  Vitals reviewed.   Constitutional:       General: She is not in acute distress.     Appearance: Normal appearance. She is not diaphoretic.   Cardiovascular:      Rate and Rhythm: Tachycardia present.      Pulses: Normal pulses.      Heart sounds: Normal heart sounds. No murmur. No friction rub. No gallop.    Pulmonary:      Effort: Pulmonary effort is normal. No respiratory distress.      Breath sounds: Normal breath sounds. No wheezing.   Abdominal:      General: Bowel sounds are normal. There is no distension.      Palpations: Abdomen is soft.      Tenderness: There is no abdominal tenderness.   Musculoskeletal:         General: No swelling or tenderness. Normal range of motion.   Skin:     General: Skin is warm and dry.      Comments: Dryness and peeling of hands - stable   Neurological:      Mental Status: She is alert and oriented to person, place, and time.   Psychiatric:         Mood and Affect: Mood normal.         Behavior: Behavior normal.            Ref. Range 2/23/2021 07:10   WBC Latest Ref Range: 4.8 - 10.8 K/uL 8.9   RBC Latest Ref Range: 4.20 - 5.40 M/uL 4.33   Hemoglobin Latest Ref Range: 12.0 - 16.0 g/dL 11.8 (L)   Hematocrit Latest Ref Range: 37.0 - " 47.0 % 37.9   MCV Latest Ref Range: 81.4 - 97.8 fL 87.5   MCH Latest Ref Range: 27.0 - 33.0 pg 27.3   MCHC Latest Ref Range: 33.6 - 35.0 g/dL 31.1 (L)   RDW Latest Ref Range: 35.9 - 50.0 fL 58.0 (H)   Platelet Count Latest Ref Range: 164 - 446 K/uL 273   MPV Latest Ref Range: 9.0 - 12.9 fL 10.1   Neutrophils-Polys Latest Ref Range: 44.00 - 72.00 % 67.30   Neutrophils (Absolute) Latest Ref Range: 2.00 - 7.15 K/uL 5.98   Lymphocytes Latest Ref Range: 22.00 - 41.00 % 16.80 (L)   Lymphs (Absolute) Latest Ref Range: 1.00 - 4.80 K/uL 1.49   Monocytes Latest Ref Range: 0.00 - 13.40 % 9.50   Monos (Absolute) Latest Ref Range: 0.00 - 0.85 K/uL 0.84   Eosinophils Latest Ref Range: 0.00 - 6.90 % 5.00   Eos (Absolute) Latest Ref Range: 0.00 - 0.51 K/uL 0.44   Basophils Latest Ref Range: 0.00 - 1.80 % 0.70   Baso (Absolute) Latest Ref Range: 0.00 - 0.12 K/uL 0.06   Immature Granulocytes Latest Ref Range: 0.00 - 0.90 % 0.70   Immature Granulocytes (abs) Latest Ref Range: 0.00 - 0.11 K/uL 0.06   Nucleated RBC Latest Units: /100 WBC 0.00   NRBC (Absolute) Latest Units: K/uL 0.00   Sodium Latest Ref Range: 135 - 145 mmol/L 135   Potassium Latest Ref Range: 3.6 - 5.5 mmol/L 3.6   Chloride Latest Ref Range: 96 - 112 mmol/L 102   Co2 Latest Ref Range: 20 - 33 mmol/L 19 (L)   Anion Gap Latest Ref Range: 7.0 - 16.0  14.0   Glucose Latest Ref Range: 65 - 99 mg/dL 114 (H)   Bun Latest Ref Range: 8 - 22 mg/dL 17   Creatinine Latest Ref Range: 0.50 - 1.40 mg/dL 0.92   GFR If  Latest Ref Range: >60 mL/min/1.73 m 2 >60   GFR If Non  Latest Ref Range: >60 mL/min/1.73 m 2 59 (A)   Calcium Latest Ref Range: 8.5 - 10.5 mg/dL 9.3   AST(SGOT) Latest Ref Range: 12 - 45 U/L 26   ALT(SGPT) Latest Ref Range: 2 - 50 U/L 28   Alkaline Phosphatase Latest Ref Range: 30 - 99 U/L 264 (H)   Total Bilirubin Latest Ref Range: 0.1 - 1.5 mg/dL 0.4   Albumin Latest Ref Range: 3.2 - 4.9 g/dL 3.5   Total Protein Latest Ref Range: 6.0 -  8.2 g/dL 6.8   Globulin Latest Ref Range: 1.9 - 3.5 g/dL 3.3   A-G Ratio Latest Units: g/dL 1.1   Carcinoembryonic Antigen Latest Ref Range: 0.0 - 3.0 ng/mL 1.4       DS-BONE DENSITY STUDY (DEXA)    Result Date: 2/22/2021 2/22/2021 10:33 AM HISTORY/REASON FOR EXAM:  Postmenopausal, hysterectomy, history of colon cancer TECHNIQUE/EXAM DESCRIPTION AND NUMBER OF VIEWS: Dual x-ray bone densitometry was performed from the L1, L2 and L4 levels and from the proximal left femur utilizing the GE Prodigy unit. COMPARISON:   None FINDINGS: The mean bone mineral density for the lumbar spine is 1.309 g/cm2, which corresponds to a T score of 1.2 and a Z score of 2.8. The proximal left femur has a mean bone mineral density of 1.001 g/cm2, with a T score of -0.1 and a Z score of 1.9.     According to the World Health Organization classification, bone mineral density of this patient is normal in the lumbar spine and normal in the left femur. 10-year Probability of Fracture: Major Osteoporotic     8.8% Hip     1.1% Population      [place] () Based on left femur neck BMD INTERPRETING LOCATION:  [address] [place] [address], [id number]         Assessment/Plan:        1. Malignant neoplasm of rectum (HCC)     2. Secondary malignant neoplasm of liver (HCC)     3. History of pulmonary embolus (PE)     4. Cancer related pain       1.  Patient with metastatic colorectal cancer to liver currently on single agent 5-FU.  She is scheduled to proceed with treatment today.  Labs pending at time of appointment, however did review CBC, CMP and CEA and labs are appropriate to proceed with treatment as planned. She has had an elevated alkaline phosphatase previously that we have been monitoring and her alk phos this week is down to 264, previous 316. CEA is down to 1.4 today.     Plan for a 3-month follow-up MRI of the liver which is scheduled for 4/5/2021.      2.  Patient with a history of pulmonary embolism currently on a low-dose Xarelto at  10 mg daily and will continue she is tolerating it well.       3.  Patient with chemotherapy-induced nausea which appears to be controlled with the use of Zofran as needed.    4.  Patient with cancer related pain currently on oxycodone 5 mg tablets.  She tends to get this more so approximately 2-3 days after treatment.  Oxycodone resolved her pain very well.  Refill was provided at last visit.  No concerns at this time.    5.  Patient will follow-up in the clinic in 2 weeks or sooner if needed.

## 2021-02-25 ENCOUNTER — OUTPATIENT INFUSION SERVICES (OUTPATIENT)
Dept: ONCOLOGY | Facility: MEDICAL CENTER | Age: 81
End: 2021-02-25
Attending: INTERNAL MEDICINE
Payer: MEDICARE

## 2021-02-25 VITALS
OXYGEN SATURATION: 98 % | HEART RATE: 68 BPM | DIASTOLIC BLOOD PRESSURE: 74 MMHG | RESPIRATION RATE: 18 BRPM | TEMPERATURE: 98.4 F | SYSTOLIC BLOOD PRESSURE: 125 MMHG

## 2021-02-25 DIAGNOSIS — C20 MALIGNANT NEOPLASM OF RECTUM (HCC): ICD-10-CM

## 2021-02-25 PROCEDURE — 700111 HCHG RX REV CODE 636 W/ 250 OVERRIDE (IP)

## 2021-02-25 PROCEDURE — 96523 IRRIG DRUG DELIVERY DEVICE: CPT

## 2021-02-25 RX ORDER — HEPARIN SODIUM (PORCINE) LOCK FLUSH IV SOLN 100 UNIT/ML 100 UNIT/ML
SOLUTION INTRAVENOUS
Status: COMPLETED
Start: 2021-02-25 | End: 2021-02-25

## 2021-02-25 RX ORDER — HEPARIN SODIUM (PORCINE) LOCK FLUSH IV SOLN 100 UNIT/ML 100 UNIT/ML
500 SOLUTION INTRAVENOUS PRN
Status: DISCONTINUED | OUTPATIENT
Start: 2021-02-25 | End: 2021-02-25 | Stop reason: HOSPADM

## 2021-02-25 RX ADMIN — HEPARIN SODIUM (PORCINE) LOCK FLUSH IV SOLN 100 UNIT/ML 500 UNITS: 100 SOLUTION at 11:12

## 2021-02-25 RX ADMIN — HEPARIN 500 UNITS: 100 SYRINGE at 11:12

## 2021-02-25 NOTE — PROGRESS NOTES
Patient arrived to hospitals for C116 D3 5FU CADD pump de-access.  Pump stopped with 0ml remaining in reservoir, 70.3 mLs administered.  Disconnected pump, Port flushed per protocol with brisk blood return noted, heparinized, de-accessed and gauze dressing applied.  Pump cleaned and placed in pharmacy drawer.  Confirmed pt's next appt. Pt discharged home in [facility].

## 2021-03-04 RX ORDER — LIDOCAINE HYDROCHLORIDE 10 MG/ML
10 INJECTION, SOLUTION INFILTRATION; PERINEURAL ONCE
Status: CANCELLED | OUTPATIENT
Start: 2021-03-09 | End: 2021-03-09

## 2021-03-04 RX ORDER — PROCHLORPERAZINE MALEATE 10 MG
10 TABLET ORAL EVERY 6 HOURS PRN
Status: CANCELLED | OUTPATIENT
Start: 2021-03-09

## 2021-03-04 RX ORDER — HEPARIN SODIUM (PORCINE) LOCK FLUSH IV SOLN 100 UNIT/ML 100 UNIT/ML
500 SOLUTION INTRAVENOUS PRN
Status: CANCELLED | OUTPATIENT
Start: 2021-03-11

## 2021-03-04 RX ORDER — DEXTROSE MONOHYDRATE 50 MG/ML
INJECTION, SOLUTION INTRAVENOUS CONTINUOUS
Status: CANCELLED | OUTPATIENT
Start: 2021-03-09

## 2021-03-04 RX ORDER — HEPARIN SODIUM (PORCINE) LOCK FLUSH IV SOLN 100 UNIT/ML 100 UNIT/ML
500 SOLUTION INTRAVENOUS PRN
Status: CANCELLED | OUTPATIENT
Start: 2021-03-09

## 2021-03-04 RX ORDER — DEXAMETHASONE SODIUM PHOSPHATE 4 MG/ML
8 INJECTION, SOLUTION INTRA-ARTICULAR; INTRALESIONAL; INTRAMUSCULAR; INTRAVENOUS; SOFT TISSUE ONCE
Status: CANCELLED | OUTPATIENT
Start: 2021-03-09 | End: 2021-03-09

## 2021-03-04 RX ORDER — ONDANSETRON 8 MG/1
8 TABLET, ORALLY DISINTEGRATING ORAL
Status: CANCELLED | OUTPATIENT
Start: 2021-03-09

## 2021-03-04 RX ORDER — ONDANSETRON 2 MG/ML
4 INJECTION INTRAMUSCULAR; INTRAVENOUS
Status: CANCELLED | OUTPATIENT
Start: 2021-03-09

## 2021-03-05 RX ORDER — PROCHLORPERAZINE MALEATE 10 MG
10 TABLET ORAL EVERY 6 HOURS PRN
Status: CANCELLED | OUTPATIENT
Start: 2021-03-23

## 2021-03-05 RX ORDER — ONDANSETRON 8 MG/1
8 TABLET, ORALLY DISINTEGRATING ORAL
Status: CANCELLED | OUTPATIENT
Start: 2021-03-23

## 2021-03-05 RX ORDER — ONDANSETRON 2 MG/ML
4 INJECTION INTRAMUSCULAR; INTRAVENOUS
Status: CANCELLED | OUTPATIENT
Start: 2021-03-23

## 2021-03-05 RX ORDER — HEPARIN SODIUM (PORCINE) LOCK FLUSH IV SOLN 100 UNIT/ML 100 UNIT/ML
500 SOLUTION INTRAVENOUS PRN
Status: CANCELLED | OUTPATIENT
Start: 2021-03-23

## 2021-03-05 RX ORDER — DEXAMETHASONE SODIUM PHOSPHATE 4 MG/ML
8 INJECTION, SOLUTION INTRA-ARTICULAR; INTRALESIONAL; INTRAMUSCULAR; INTRAVENOUS; SOFT TISSUE ONCE
Status: CANCELLED | OUTPATIENT
Start: 2021-03-23 | End: 2021-03-23

## 2021-03-05 RX ORDER — DEXTROSE MONOHYDRATE 50 MG/ML
INJECTION, SOLUTION INTRAVENOUS CONTINUOUS
Status: CANCELLED | OUTPATIENT
Start: 2021-03-23

## 2021-03-05 RX ORDER — LIDOCAINE HYDROCHLORIDE 10 MG/ML
10 INJECTION, SOLUTION INFILTRATION; PERINEURAL ONCE
Status: CANCELLED | OUTPATIENT
Start: 2021-03-23 | End: 2021-03-23

## 2021-03-05 RX ORDER — HEPARIN SODIUM (PORCINE) LOCK FLUSH IV SOLN 100 UNIT/ML 100 UNIT/ML
500 SOLUTION INTRAVENOUS PRN
Status: CANCELLED | OUTPATIENT
Start: 2021-03-25

## 2021-03-09 ENCOUNTER — OUTPATIENT INFUSION SERVICES (OUTPATIENT)
Dept: ONCOLOGY | Facility: MEDICAL CENTER | Age: 81
End: 2021-03-09
Attending: INTERNAL MEDICINE
Payer: MEDICARE

## 2021-03-09 ENCOUNTER — HOSPITAL ENCOUNTER (OUTPATIENT)
Facility: MEDICAL CENTER | Age: 81
End: 2021-03-11
Attending: EMERGENCY MEDICINE | Admitting: INTERNAL MEDICINE
Payer: MEDICARE

## 2021-03-09 ENCOUNTER — OFFICE VISIT (OUTPATIENT)
Dept: HEMATOLOGY ONCOLOGY | Facility: MEDICAL CENTER | Age: 81
End: 2021-03-09
Payer: MEDICARE

## 2021-03-09 ENCOUNTER — OFFICE VISIT (OUTPATIENT)
Dept: CARDIOLOGY | Facility: MEDICAL CENTER | Age: 81
End: 2021-03-09
Payer: MEDICARE

## 2021-03-09 ENCOUNTER — HOSPITAL ENCOUNTER (OUTPATIENT)
Dept: RADIOLOGY | Facility: MEDICAL CENTER | Age: 81
End: 2021-03-09
Attending: NURSE PRACTITIONER
Payer: MEDICARE

## 2021-03-09 VITALS
DIASTOLIC BLOOD PRESSURE: 58 MMHG | HEIGHT: 65 IN | HEART RATE: 110 BPM | WEIGHT: 151 LBS | RESPIRATION RATE: 16 BRPM | SYSTOLIC BLOOD PRESSURE: 114 MMHG | BODY MASS INDEX: 25.16 KG/M2 | OXYGEN SATURATION: 96 %

## 2021-03-09 VITALS
RESPIRATION RATE: 24 BRPM | OXYGEN SATURATION: 96 % | BODY MASS INDEX: 26.1 KG/M2 | HEIGHT: 65 IN | DIASTOLIC BLOOD PRESSURE: 64 MMHG | WEIGHT: 156.64 LBS | SYSTOLIC BLOOD PRESSURE: 124 MMHG | HEART RATE: 121 BPM | TEMPERATURE: 99.8 F

## 2021-03-09 VITALS
WEIGHT: 156.97 LBS | DIASTOLIC BLOOD PRESSURE: 71 MMHG | SYSTOLIC BLOOD PRESSURE: 151 MMHG | OXYGEN SATURATION: 96 % | TEMPERATURE: 97.8 F | RESPIRATION RATE: 18 BRPM | HEART RATE: 123 BPM | HEIGHT: 64 IN | BODY MASS INDEX: 26.8 KG/M2

## 2021-03-09 VITALS
HEIGHT: 64 IN | DIASTOLIC BLOOD PRESSURE: 71 MMHG | OXYGEN SATURATION: 96 % | TEMPERATURE: 97.8 F | SYSTOLIC BLOOD PRESSURE: 151 MMHG | RESPIRATION RATE: 18 BRPM | WEIGHT: 156.97 LBS | HEART RATE: 123 BPM | BODY MASS INDEX: 26.8 KG/M2

## 2021-03-09 DIAGNOSIS — R06.02 SOB (SHORTNESS OF BREATH): ICD-10-CM

## 2021-03-09 DIAGNOSIS — D64.9 ANEMIA, UNSPECIFIED TYPE: ICD-10-CM

## 2021-03-09 DIAGNOSIS — N28.9 RENAL INSUFFICIENCY: ICD-10-CM

## 2021-03-09 DIAGNOSIS — R06.02 SHORTNESS OF BREATH: ICD-10-CM

## 2021-03-09 DIAGNOSIS — D70.1 CHEMOTHERAPY-INDUCED NEUTROPENIA (HCC): ICD-10-CM

## 2021-03-09 DIAGNOSIS — C20 MALIGNANT NEOPLASM OF RECTUM (HCC): ICD-10-CM

## 2021-03-09 DIAGNOSIS — R05.9 COUGH: ICD-10-CM

## 2021-03-09 DIAGNOSIS — Z79.899 HIGH RISK MEDICATION USE: ICD-10-CM

## 2021-03-09 DIAGNOSIS — R73.9 HYPERGLYCEMIA: ICD-10-CM

## 2021-03-09 DIAGNOSIS — R19.7 DIARRHEA, UNSPECIFIED TYPE: ICD-10-CM

## 2021-03-09 DIAGNOSIS — E78.5 HYPERLIPIDEMIA, UNSPECIFIED HYPERLIPIDEMIA TYPE: ICD-10-CM

## 2021-03-09 DIAGNOSIS — Z86.711 HISTORY OF PULMONARY EMBOLUS (PE): ICD-10-CM

## 2021-03-09 DIAGNOSIS — Z79.01 CURRENT USE OF LONG TERM ANTICOAGULATION: Chronic | ICD-10-CM

## 2021-03-09 DIAGNOSIS — R60.0 LOCALIZED EDEMA: ICD-10-CM

## 2021-03-09 DIAGNOSIS — R94.31 ABNORMAL EKG: ICD-10-CM

## 2021-03-09 DIAGNOSIS — Z79.01 CURRENT USE OF LONG TERM ANTICOAGULATION: ICD-10-CM

## 2021-03-09 DIAGNOSIS — E86.0 DEHYDRATION: ICD-10-CM

## 2021-03-09 DIAGNOSIS — I10 ESSENTIAL HYPERTENSION: ICD-10-CM

## 2021-03-09 DIAGNOSIS — I47.10 PAROXYSMAL SUPRAVENTRICULAR TACHYCARDIA (HCC): ICD-10-CM

## 2021-03-09 DIAGNOSIS — C78.7 SECONDARY MALIGNANT NEOPLASM OF LIVER (HCC): ICD-10-CM

## 2021-03-09 DIAGNOSIS — T45.1X5A CHEMOTHERAPY-INDUCED NEUTROPENIA (HCC): ICD-10-CM

## 2021-03-09 DIAGNOSIS — R06.09 DYSPNEA ON EXERTION: ICD-10-CM

## 2021-03-09 DIAGNOSIS — R94.4 DECREASED GFR: ICD-10-CM

## 2021-03-09 LAB
ALBUMIN SERPL BCP-MCNC: 3.3 G/DL (ref 3.2–4.9)
ALBUMIN SERPL BCP-MCNC: 3.3 G/DL (ref 3.2–4.9)
ALBUMIN/GLOB SERPL: 0.9 G/DL
ALBUMIN/GLOB SERPL: 1.1 G/DL
ALP SERPL-CCNC: 241 U/L (ref 30–99)
ALP SERPL-CCNC: 277 U/L (ref 30–99)
ALT SERPL-CCNC: 34 U/L (ref 2–50)
ALT SERPL-CCNC: 38 U/L (ref 2–50)
ANION GAP SERPL CALC-SCNC: 12 MMOL/L (ref 7–16)
ANION GAP SERPL CALC-SCNC: 15 MMOL/L (ref 7–16)
AST SERPL-CCNC: 45 U/L (ref 12–45)
AST SERPL-CCNC: 54 U/L (ref 12–45)
BASOPHILS # BLD AUTO: 1.8 % (ref 0–1.8)
BASOPHILS # BLD: 0.08 K/UL (ref 0–0.12)
BILIRUB SERPL-MCNC: 0.5 MG/DL (ref 0.1–1.5)
BILIRUB SERPL-MCNC: 0.6 MG/DL (ref 0.1–1.5)
BUN SERPL-MCNC: 20 MG/DL (ref 8–22)
BUN SERPL-MCNC: 23 MG/DL (ref 8–22)
CALCIUM SERPL-MCNC: 9.1 MG/DL (ref 8.5–10.5)
CALCIUM SERPL-MCNC: 9.5 MG/DL (ref 8.5–10.5)
CEA SERPL-MCNC: 1.3 NG/ML (ref 0–3)
CHLORIDE SERPL-SCNC: 97 MMOL/L (ref 96–112)
CHLORIDE SERPL-SCNC: 97 MMOL/L (ref 96–112)
CO2 SERPL-SCNC: 17 MMOL/L (ref 20–33)
CO2 SERPL-SCNC: 21 MMOL/L (ref 20–33)
CREAT SERPL-MCNC: 0.99 MG/DL (ref 0.5–1.4)
CREAT SERPL-MCNC: 1.16 MG/DL (ref 0.5–1.4)
EKG IMPRESSION: NORMAL
EOSINOPHIL # BLD AUTO: 0.15 K/UL (ref 0–0.51)
EOSINOPHIL NFR BLD: 3.6 % (ref 0–6.9)
ERYTHROCYTE [DISTWIDTH] IN BLOOD BY AUTOMATED COUNT: 58.2 FL (ref 35.9–50)
GLOBULIN SER CALC-MCNC: 3.1 G/DL (ref 1.9–3.5)
GLOBULIN SER CALC-MCNC: 3.7 G/DL (ref 1.9–3.5)
GLUCOSE SERPL-MCNC: 142 MG/DL (ref 65–99)
GLUCOSE SERPL-MCNC: 91 MG/DL (ref 65–99)
HCT VFR BLD AUTO: 38.9 % (ref 37–47)
HGB BLD-MCNC: 12.2 G/DL (ref 12–16)
LYMPHOCYTES # BLD AUTO: 0.99 K/UL (ref 1–4.8)
LYMPHOCYTES NFR BLD: 23.6 % (ref 22–41)
MANUAL DIFF BLD: NORMAL
MCH RBC QN AUTO: 27.1 PG (ref 27–33)
MCHC RBC AUTO-ENTMCNC: 31.4 G/DL (ref 33.6–35)
MCV RBC AUTO: 86.3 FL (ref 81.4–97.8)
MONOCYTES # BLD AUTO: 0.53 K/UL (ref 0–0.85)
MONOCYTES NFR BLD AUTO: 12.7 % (ref 0–13.4)
MORPHOLOGY BLD-IMP: NORMAL
MYELOCYTES NFR BLD MANUAL: 0.9 %
NEUTROPHILS # BLD AUTO: 2.4 K/UL (ref 2–7.15)
NEUTROPHILS NFR BLD: 52.7 % (ref 44–72)
NEUTS BAND NFR BLD MANUAL: 4.5 % (ref 0–10)
NRBC # BLD AUTO: 0 K/UL
NRBC BLD-RTO: 0 /100 WBC
NT-PROBNP SERPL IA-MCNC: 229 PG/ML (ref 0–125)
PLATELET # BLD AUTO: 186 K/UL (ref 164–446)
PMV BLD AUTO: 10.5 FL (ref 9–12.9)
POTASSIUM SERPL-SCNC: 4 MMOL/L (ref 3.6–5.5)
POTASSIUM SERPL-SCNC: 4.2 MMOL/L (ref 3.6–5.5)
PROT SERPL-MCNC: 6.4 G/DL (ref 6–8.2)
PROT SERPL-MCNC: 7 G/DL (ref 6–8.2)
RBC # BLD AUTO: 4.51 M/UL (ref 4.2–5.4)
SODIUM SERPL-SCNC: 129 MMOL/L (ref 135–145)
SODIUM SERPL-SCNC: 130 MMOL/L (ref 135–145)
TROPONIN T SERPL-MCNC: 15 NG/L (ref 6–19)
WBC # BLD AUTO: 4.2 K/UL (ref 4.8–10.8)

## 2021-03-09 PROCEDURE — 80053 COMPREHEN METABOLIC PANEL: CPT | Mod: 91

## 2021-03-09 PROCEDURE — 93000 ELECTROCARDIOGRAM COMPLETE: CPT | Performed by: INTERNAL MEDICINE

## 2021-03-09 PROCEDURE — 99285 EMERGENCY DEPT VISIT HI MDM: CPT

## 2021-03-09 PROCEDURE — A4212 NON CORING NEEDLE OR STYLET: HCPCS

## 2021-03-09 PROCEDURE — 96361 HYDRATE IV INFUSION ADD-ON: CPT

## 2021-03-09 PROCEDURE — 306578 KIT,PORT ACCESS 20G X 1.5INCH

## 2021-03-09 PROCEDURE — 700111 HCHG RX REV CODE 636 W/ 250 OVERRIDE (IP): Performed by: NURSE PRACTITIONER

## 2021-03-09 PROCEDURE — 84484 ASSAY OF TROPONIN QUANT: CPT

## 2021-03-09 PROCEDURE — 700117 HCHG RX CONTRAST REV CODE 255: Performed by: NURSE PRACTITIONER

## 2021-03-09 PROCEDURE — U0003 INFECTIOUS AGENT DETECTION BY NUCLEIC ACID (DNA OR RNA); SEVERE ACUTE RESPIRATORY SYNDROME CORONAVIRUS 2 (SARS-COV-2) (CORONAVIRUS DISEASE [COVID-19]), AMPLIFIED PROBE TECHNIQUE, MAKING USE OF HIGH THROUGHPUT TECHNOLOGIES AS DESCRIBED BY CMS-2020-01-R: HCPCS

## 2021-03-09 PROCEDURE — 99214 OFFICE O/P EST MOD 30 MIN: CPT | Performed by: NURSE PRACTITIONER

## 2021-03-09 PROCEDURE — 71260 CT THORAX DX C+: CPT

## 2021-03-09 PROCEDURE — U0005 INFEC AGEN DETEC AMPLI PROBE: HCPCS

## 2021-03-09 PROCEDURE — 80053 COMPREHEN METABOLIC PANEL: CPT

## 2021-03-09 PROCEDURE — 96360 HYDRATION IV INFUSION INIT: CPT | Mod: XU

## 2021-03-09 PROCEDURE — 82378 CARCINOEMBRYONIC ANTIGEN: CPT

## 2021-03-09 PROCEDURE — 84443 ASSAY THYROID STIM HORMONE: CPT

## 2021-03-09 PROCEDURE — 99213 OFFICE O/P EST LOW 20 MIN: CPT | Mod: 25 | Performed by: INTERNAL MEDICINE

## 2021-03-09 PROCEDURE — 85007 BL SMEAR W/DIFF WBC COUNT: CPT

## 2021-03-09 PROCEDURE — 83880 ASSAY OF NATRIURETIC PEPTIDE: CPT

## 2021-03-09 PROCEDURE — 93005 ELECTROCARDIOGRAM TRACING: CPT | Performed by: EMERGENCY MEDICINE

## 2021-03-09 PROCEDURE — 85027 COMPLETE CBC AUTOMATED: CPT

## 2021-03-09 PROCEDURE — 700101 HCHG RX REV CODE 250: Performed by: NURSE PRACTITIONER

## 2021-03-09 PROCEDURE — 306578 KIT,PORT ACCESS 20G X 1.5INCH: Performed by: EMERGENCY MEDICINE

## 2021-03-09 PROCEDURE — G0378 HOSPITAL OBSERVATION PER HR: HCPCS

## 2021-03-09 RX ORDER — ASPIRIN 81 MG/1
324 TABLET, CHEWABLE ORAL DAILY
Status: DISCONTINUED | OUTPATIENT
Start: 2021-03-10 | End: 2021-03-11 | Stop reason: HOSPADM

## 2021-03-09 RX ORDER — FUROSEMIDE 40 MG/1
20 TABLET ORAL
Status: DISCONTINUED | OUTPATIENT
Start: 2021-03-10 | End: 2021-03-10

## 2021-03-09 RX ORDER — HEPARIN SODIUM (PORCINE) LOCK FLUSH IV SOLN 100 UNIT/ML 100 UNIT/ML
300-500 SOLUTION INTRAVENOUS EVERY 12 HOURS
Status: COMPLETED | OUTPATIENT
Start: 2021-03-09 | End: 2021-03-09

## 2021-03-09 RX ORDER — ASPIRIN 300 MG/1
300 SUPPOSITORY RECTAL DAILY
Status: DISCONTINUED | OUTPATIENT
Start: 2021-03-10 | End: 2021-03-11 | Stop reason: HOSPADM

## 2021-03-09 RX ORDER — 0.9 % SODIUM CHLORIDE 0.9 %
VIAL (ML) INJECTION PRN
Status: CANCELLED | OUTPATIENT
Start: 2021-03-09

## 2021-03-09 RX ORDER — TRAZODONE HYDROCHLORIDE 50 MG/1
50 TABLET ORAL
Status: DISCONTINUED | OUTPATIENT
Start: 2021-03-09 | End: 2021-03-11 | Stop reason: HOSPADM

## 2021-03-09 RX ORDER — HEPARIN SODIUM (PORCINE) LOCK FLUSH IV SOLN 100 UNIT/ML 100 UNIT/ML
500 SOLUTION INTRAVENOUS PRN
Status: CANCELLED | OUTPATIENT
Start: 2021-03-09

## 2021-03-09 RX ORDER — HEPARIN SODIUM (PORCINE) LOCK FLUSH IV SOLN 100 UNIT/ML 100 UNIT/ML
500 SOLUTION INTRAVENOUS PRN
Status: DISCONTINUED | OUTPATIENT
Start: 2021-03-09 | End: 2021-03-09 | Stop reason: HOSPADM

## 2021-03-09 RX ORDER — OXYCODONE HYDROCHLORIDE 5 MG/1
5 TABLET ORAL EVERY 6 HOURS
Status: DISCONTINUED | OUTPATIENT
Start: 2021-03-10 | End: 2021-03-11 | Stop reason: HOSPADM

## 2021-03-09 RX ORDER — BISACODYL 10 MG
10 SUPPOSITORY, RECTAL RECTAL
Status: DISCONTINUED | OUTPATIENT
Start: 2021-03-09 | End: 2021-03-11 | Stop reason: HOSPADM

## 2021-03-09 RX ORDER — HEPARIN SODIUM (PORCINE) LOCK FLUSH IV SOLN 100 UNIT/ML 100 UNIT/ML
300-500 SOLUTION INTRAVENOUS EVERY 12 HOURS
Status: DISCONTINUED | OUTPATIENT
Start: 2021-03-09 | End: 2021-03-09

## 2021-03-09 RX ORDER — AMOXICILLIN 250 MG
2 CAPSULE ORAL 2 TIMES DAILY
Status: DISCONTINUED | OUTPATIENT
Start: 2021-03-09 | End: 2021-03-11 | Stop reason: HOSPADM

## 2021-03-09 RX ORDER — ASPIRIN 325 MG
325 TABLET ORAL DAILY
Status: DISCONTINUED | OUTPATIENT
Start: 2021-03-10 | End: 2021-03-11 | Stop reason: HOSPADM

## 2021-03-09 RX ORDER — LORATADINE 10 MG/1
10 TABLET ORAL DAILY
Status: DISCONTINUED | OUTPATIENT
Start: 2021-03-10 | End: 2021-03-11 | Stop reason: HOSPADM

## 2021-03-09 RX ORDER — 0.9 % SODIUM CHLORIDE 0.9 %
3 VIAL (ML) INJECTION PRN
Status: CANCELLED | OUTPATIENT
Start: 2021-03-09

## 2021-03-09 RX ORDER — POTASSIUM CHLORIDE 750 MG/1
10 TABLET, FILM COATED, EXTENDED RELEASE ORAL DAILY
Status: DISCONTINUED | OUTPATIENT
Start: 2021-03-10 | End: 2021-03-10

## 2021-03-09 RX ORDER — 0.9 % SODIUM CHLORIDE 0.9 %
10 VIAL (ML) INJECTION PRN
Status: CANCELLED | OUTPATIENT
Start: 2021-03-09

## 2021-03-09 RX ORDER — ALBUTEROL SULFATE 90 UG/1
2 AEROSOL, METERED RESPIRATORY (INHALATION) EVERY 6 HOURS PRN
Status: DISCONTINUED | OUTPATIENT
Start: 2021-03-09 | End: 2021-03-11 | Stop reason: HOSPADM

## 2021-03-09 RX ORDER — LABETALOL HYDROCHLORIDE 5 MG/ML
10 INJECTION, SOLUTION INTRAVENOUS EVERY 4 HOURS PRN
Status: DISCONTINUED | OUTPATIENT
Start: 2021-03-09 | End: 2021-03-11 | Stop reason: HOSPADM

## 2021-03-09 RX ORDER — SODIUM CHLORIDE AND POTASSIUM CHLORIDE 150; 900 MG/100ML; MG/100ML
INJECTION, SOLUTION INTRAVENOUS ONCE
Status: COMPLETED | OUTPATIENT
Start: 2021-03-09 | End: 2021-03-09

## 2021-03-09 RX ORDER — POLYETHYLENE GLYCOL 3350 17 G/17G
1 POWDER, FOR SOLUTION ORAL
Status: DISCONTINUED | OUTPATIENT
Start: 2021-03-09 | End: 2021-03-11 | Stop reason: HOSPADM

## 2021-03-09 RX ORDER — SODIUM CHLORIDE AND POTASSIUM CHLORIDE 150; 900 MG/100ML; MG/100ML
INJECTION, SOLUTION INTRAVENOUS ONCE
Status: CANCELLED | OUTPATIENT
Start: 2021-03-09 | End: 2021-03-09

## 2021-03-09 RX ADMIN — HEPARIN 500 UNITS: 100 SYRINGE at 16:30

## 2021-03-09 RX ADMIN — HEPARIN 500 UNITS: 100 SYRINGE at 13:32

## 2021-03-09 RX ADMIN — POTASSIUM CHLORIDE AND SODIUM CHLORIDE: 900; 150 INJECTION, SOLUTION INTRAVENOUS at 09:17

## 2021-03-09 RX ADMIN — IOHEXOL 75 ML: 350 INJECTION, SOLUTION INTRAVENOUS at 16:10

## 2021-03-09 ASSESSMENT — ENCOUNTER SYMPTOMS
HEMOPTYSIS: 0
CONSTIPATION: 0
HEMOPTYSIS: 0
FEVER: 0
NEUROLOGICAL NEGATIVE: 1
LOSS OF CONSCIOUSNESS: 0
SHORTNESS OF BREATH: 0
FEVER: 0
CHILLS: 0
COUGH: 1
ORTHOPNEA: 0
SPUTUM PRODUCTION: 0
PALPITATIONS: 0
WEAKNESS: 0
INSOMNIA: 0
COUGH: 0
CLAUDICATION: 0
MYALGIAS: 0
RESPIRATORY NEGATIVE: 1
EYES NEGATIVE: 1
SHORTNESS OF BREATH: 1
PND: 0
WEIGHT LOSS: 1
GASTROINTESTINAL NEGATIVE: 1
SORE THROAT: 0
BRUISES/BLEEDS EASILY: 0
WHEEZING: 1
DIZZINESS: 1
CARDIOVASCULAR NEGATIVE: 1
STRIDOR: 0
MUSCULOSKELETAL NEGATIVE: 1
DIARRHEA: 0
PALPITATIONS: 0
WHEEZING: 0
CONSTITUTIONAL NEGATIVE: 1
DIZZINESS: 0
VOMITING: 0
NAUSEA: 0
HEADACHES: 0
SPUTUM PRODUCTION: 0
CHILLS: 0
DIAPHORESIS: 0

## 2021-03-09 ASSESSMENT — FIBROSIS 4 INDEX
FIB4 SCORE: 3.32
FIB4 SCORE: 1.44
FIB4 SCORE: 2.11
FIB4 SCORE: 3.32
FIB4 SCORE: 3.32

## 2021-03-09 ASSESSMENT — PAIN SCALES - GENERAL: PAINLEVEL: NO PAIN

## 2021-03-09 NOTE — PROGRESS NOTES
Pt in wc to [facility] due to SOB for hydration.  Pt saw oncology between lab draw this morning and this appt.  This RN received a call fron Ella MORALES that pt would not be receiving chemo this morning.  Instead pt is to receive hydration and go for a CT scan this afternoon.  Pt PORT already accessed this morning w/ regular hernandez needle.  Needle to be exchanged for a power needle, pt aware and agrees.  PORT de-accessed and re-accessed w/ power needle, brisk blood return noted, flushed per protocol.  1L NS w/ 20 mEq of K+ infusing @ 125 mL/hr per order.  Spoke w/ Ella MORALES @ 1325, 1/2 of infusion is done, pt would like to go home before her CT to change clothes.  Per Ella ok to stop infusion at this time.  PORT w/ brisk blood return post-infusion, flushed and heparinized per protocol, left accessed for CT scan this afternoon.  Pt left on foot in care of self in NAD.  Pt's next appt in place.

## 2021-03-09 NOTE — PROGRESS NOTES
"Pharmacy Chemotherapy Verification   * * pt will delay day; receiving hydration / supportive care and imaging * *   Patient Name: Karlene Walters      Dx: Colon Ca      Protocol: 5-FU + Leucovorin         Leucovorin 400 mg/m² IV over 2 hours on Day 1, followed by  Fluorouracil 400 mg/m²  IVP on Day 1, followed by                --7/5/16: Dose reduced by 20% to 320 mg/m² starting with Cycle 18 ([place] #11)  due to neutropenia                -- 10/31/17: delete Leucovorin and 5-FU push d/t neutropenia per Dr. Hardy   Fluorouracil 2400 mg/m² continuous infusion over 46-48 hours                --7/5/16: Infusion Dose reduced by 20% starting with Cycle 18 ([place] #11)  due to neutropenia               --20% reduction (1920 mg/m²) to be continued starting C28 per LLOYD Alsop APRN    14 day cycles for 12 cycles (adjuvant) or until disease progression or unacceptable toxicity  *Dosing Reference*     NCCN Guidelines for Colon Cancer. V.2.2015.  Jay T, et al. Eur J Cancer.1999;35(9):1343-7.     Allergies:  Oxaliplatin; Codeine; Pcn [penicillins]; Sulfa drugs; and Tape     /71   Pulse (!) 123   Temp 36.6 °C (97.8 °F) (Temporal) Comment: vs transfered from this morning.  Resp 18   Ht 1.63 m (5' 4.17\")   Wt 71.2 kg (156 lb 15.5 oz)   LMP  (LMP Unknown)   SpO2 96%   BMI 26.80 kg/m²  Body surface area is 1.8 meters squared.     Labs   NO treatment     Drug Order   (Drug name, dose, route, IV Fluid & volume, frequency, number of doses) Cycle: 125 ([place] cycle 117)    Previous treatment: 2/9/21     Medication = Fluorouracil (5-FU)  Base Dose = 1920 mg/m²  Calc Dose: Base Dose x 1.8* m² = * mg  Final Dose = * mg   Route = CIVI via CADD pump  Fluid & Volume = * mL (+ 3 mL overfill = * mL)  Admin Duration = Over 46 hours @ 1.5* mL/hour   via CADD pump       <10% difference, ok to treat with final written dose               "

## 2021-03-09 NOTE — PROGRESS NOTES
Pt ambulatory to [facility] for pre-chemo lab draw.  PORT accessed using sterile technique, brisk blood return noted, labs drawn per orders, flushed per protocol.  This RN walked pt to her doctor appt, pt to return after for chemo.

## 2021-03-09 NOTE — PROGRESS NOTES
Subjective:      Karlene Walters is a 80 y.o. female who presents for prechemo for metastatic rectal carcinoma.    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 She presents unaccompanied for today's visit.  Patient is scheduled to receive cycle #126 ([facility] #118) single agent 5-FU which she continues on every 2-week interval.      Cancer History  Patient with a long history of rectal carcinoma initially diagnosed in 2012.  She was found to have metastatic disease to liver and lung in 2015.  She is status post liver ablation as well as has received Y 90 in the past.  She was on XELOX initially but had allergic reaction to oxaliplatin (last dose 7/28/15) and was switched to single agent 5-FU. Patient initially on 5-FU at 2400 mg/m2 with Leucovorin and 5-FU push starting on 10/27/15, followed by 20% dose reduction to 1920 mg/m2 for cycle 18 (7/5/16), followed by deletion of Leucovorin and 5-FU push on 10/31/17. In January 2020 patient was changed to every 3-week cycle at cycle #99 ([facility] #91) and then changed to every 4-week interval in May 2020.  It was at that time there was noted to have growth of disease within the liver and was seen by Dr. West, surgeon.  Patient did undergo surgery in hopes of resection of the tumor but it was felt to be too close to the hilum and therefore she did undergo ablation instead on 5/28/20.  She has been back on single agent 5-FU at every 2-week interval, restarted on 6/16/20. Last CT 10/06/20 showed the tumor ablation cavities within the right lobe of the liver have decreased in size since previous examination.  There is a 1.5 x 3.1 cm focal area of enhancement adjacent to the ablation cavity that appears to have increased concerning for possible tumor recurrence.  Based on that finding she was sent for an MRI for further evaluation on 10/8/2020 which confirmed that the appearance of the masses noted in the liver did not have an appearance  for suspicious finding for recurrent metastatic disease.  MRI of the abdomen completed on 1/11/2021 showed changes consistent with a partial right hepatectomy.  The ablation cavity showed no enhancement, alteration of diffusion signal or change, and therefore the reading radiologist stated this is noted to be a complete response to therapy.  MRI does also note a very stable 2.2 x 2.1 cm enhancing right cardiophrenic angle mass.  Questionable whether this is either adenopathy versus less likely a pulmonary parenchymal mass.  Patient does follow-up with pulmonary scheduled in May with a repeat CT chest at that time.  Per surgeon and Dr. Steele we will continue to monitor patient with a follow-up MRI abdomen in 3 months.  We will continue on current treatment as planned.      Interval History  Patient presents to the office today with significantly apparent fatigue and shortness of breath.  Patient stated the fatigue has worsened over the last 2 weeks.  She stated it took her till noon yesterday to get the energy to get up and take a shower.  After the shower it took her approximately 1 hour for recovery until she was able to change her ostomy appliance.  The fatigue is significant with severe shortness of breath just walking 10 feet causes her to feel extreme shortness of breath.  She notes decrease in her appetite, noting a 2 pound weight loss in the last 2 weeks.  She is tachycardic today with a heart rate of 121.  Patient also noted a dry hacking cough which was significant.  Patient was started on a new inhaler, Stiolto by pulmonary previously.  However patient did stop the medication as she was having side effects.  When she discontinued it side effects have resolved.  However with her recent shortness of breath and coughing she did restart the medication and has had some minimal relief with regards to her cough.  Patient also notes to have some dizziness and feeling like her legs are about to give out when she  "has been standing for long periods of time.     Patient denies any fevers or chills.  She denies any chest pain or heart palpitations, with the exception of immediately following her 5-FU infusion which has been very chronic over many years.      Confirmed that patient received her Covid vaccine, dose #2 on 2/16.  She denies any exposure to anyone with Covid.    Allergies   Allergen Reactions   • Oxaliplatin Anaphylaxis   • Codeine      \"gets drunk\"   • Pcn [Penicillins] Itching     itching   • Sulfa Drugs Itching     itching   • Tape Rash     PAPER TAPE OK     Current Outpatient Medications on File Prior to Visit   Medication Sig Dispense Refill   • oxyCODONE immediate-release (ROXICODONE) 5 MG Tab Take 1 Tab by mouth every 6 hours for 30 days. 45 Tab 0   • rivaroxaban (XARELTO) 10 MG Tab tablet Take 1 Tab by mouth every day. 30 Tab 5   • Tiotropium Bromide-Olodaterol (STIOLTO RESPIMAT) 2.5-2.5 MCG/ACT Aero Soln Take 2 Puffs by mouth every day. 2 Each 0   • furosemide (LASIX) 40 MG Tab      • potassium chloride ER (KLOR-CON) 10 MEQ tablet Take 1 Tab by mouth every day. 100 Tab 3   • ondansetron (ZOFRAN) 4 MG Tab tablet Take 1 Tab by mouth every four hours as needed. 30 Tab 5   • lidocaine-prilocaine (EMLA) 2.5-2.5 % Cream Apply to port 1 hour prior to access of port and cover with plastic wrap. 30 g 3   • Tiotropium Bromide-Olodaterol (STIOLTO RESPIMAT) 2.5-2.5 MCG/ACT Aero Soln Take 2 Puffs by mouth every day. 1 Inhaler 11   • albuterol 108 (90 Base) MCG/ACT Aero Soln inhalation aerosol Inhale 2 Puffs by mouth every 6 hours as needed for Shortness of Breath. 8.5 g 11   • metronidazole (METROCREAM) 0.75 % cream Apply 1 Each to affected area(s) 2 times a day.     • cyanocobalamin (VITAMIN B-12) 500 MCG Tab Take 1,000 mcg by mouth every day.     • Multiple Vitamins-Minerals (CENTRUM SILVER PO) Take 1 Tab by mouth every day.     • Cholecalciferol (VITAMIN D3) 400 UNITS CAPS Take 1 Cap by mouth every day.     • " "loratadine (CLARITIN) 10 MG TABS Take 10 mg by mouth every day. Indications: Hayfever       Current Facility-Administered Medications on File Prior to Visit   Medication Dose Route Frequency Provider Last Rate Last Admin   • heparin lock flush 100 unit/mL injection 500 Units  500 Units Intracatheter PRN Ella De, A.P.N.             Review of Systems   Constitutional: Positive for malaise/fatigue and weight loss. Negative for chills, diaphoresis and fever.   Respiratory: Positive for cough (dry cough), shortness of breath and wheezing. Negative for hemoptysis and sputum production.    Cardiovascular: Negative for chest pain and palpitations.   Gastrointestinal: Negative for constipation, diarrhea, nausea and vomiting.   Genitourinary: Negative for dysuria.   Musculoskeletal: Negative for myalgias.   Neurological: Positive for dizziness. Negative for headaches.   Psychiatric/Behavioral: The patient does not have insomnia.           Objective:     /64   Pulse (!) 121   Temp 37.7 °C (99.8 °F) (Temporal)   Resp (!) 24   Ht 1.651 m (5' 5\")   Wt 71.1 kg (156 lb 10.2 oz)   LMP  (LMP Unknown)   SpO2 96%   BMI 26.07 kg/m²      Physical Exam  Constitutional:       General: She is in acute distress (severe SOB).      Appearance: She is not diaphoretic.   HENT:      Head: Normocephalic and atraumatic.   Cardiovascular:      Rate and Rhythm: Tachycardia present.      Pulses: Normal pulses.      Heart sounds: Normal heart sounds. No murmur. No friction rub. No gallop.    Pulmonary:      Effort: Respiratory distress (severe SOB) present.      Breath sounds: Normal breath sounds. No wheezing.   Abdominal:      General: Bowel sounds are normal. There is no distension.      Palpations: Abdomen is soft.      Tenderness: There is no abdominal tenderness.   Musculoskeletal:         General: No swelling or tenderness. Normal range of motion.   Skin:     General: Skin is warm and dry.   Neurological:      Mental " Status: She is alert and oriented to person, place, and time.   Psychiatric:         Mood and Affect: Mood normal.         Behavior: Behavior normal.         Results for KANG HALL (MRN [id number])    Ref. Range 3/9/2021 07:30   WBC Latest Ref Range: 4.8 - 10.8 K/uL 4.2 (L)   RBC Latest Ref Range: 4.20 - 5.40 M/uL 4.51   Hemoglobin Latest Ref Range: 12.0 - 16.0 g/dL 12.2   Hematocrit Latest Ref Range: 37.0 - 47.0 % 38.9   MCV Latest Ref Range: 81.4 - 97.8 fL 86.3   MCH Latest Ref Range: 27.0 - 33.0 pg 27.1   MCHC Latest Ref Range: 33.6 - 35.0 g/dL 31.4 (L)   RDW Latest Ref Range: 35.9 - 50.0 fL 58.2 (H)   Platelet Count Latest Ref Range: 164 - 446 K/uL 186   MPV Latest Ref Range: 9.0 - 12.9 fL 10.5   Neutrophils-Polys Latest Ref Range: 44.00 - 72.00 % 52.70   Neutrophils (Absolute) Latest Ref Range: 2.00 - 7.15 K/uL 2.40   Bands-Stabs Latest Ref Range: 0.00 - 10.00 % 4.50   Lymphocytes Latest Ref Range: 22.00 - 41.00 % 23.60   Lymphs (Absolute) Latest Ref Range: 1.00 - 4.80 K/uL 0.99 (L)   Monocytes Latest Ref Range: 0.00 - 13.40 % 12.70   Monos (Absolute) Latest Ref Range: 0.00 - 0.85 K/uL 0.53   Eosinophils Latest Ref Range: 0.00 - 6.90 % 3.60   Eos (Absolute) Latest Ref Range: 0.00 - 0.51 K/uL 0.15   Basophils Latest Ref Range: 0.00 - 1.80 % 1.80   Baso (Absolute) Latest Ref Range: 0.00 - 0.12 K/uL 0.08   Myelocytes Latest Units: % 0.90   Nucleated RBC Latest Units: /100 WBC 0.00   NRBC (Absolute) Latest Units: K/uL 0.00   Peripheral Smear Review Unknown see below   Manual Diff Status Unknown PERFORMED   Sodium Latest Ref Range: 135 - 145 mmol/L 129 (L)   Potassium Latest Ref Range: 3.6 - 5.5 mmol/L 4.0   Chloride Latest Ref Range: 96 - 112 mmol/L 97   Co2 Latest Ref Range: 20 - 33 mmol/L 17 (L)   Anion Gap Latest Ref Range: 7.0 - 16.0  15.0   Glucose Latest Ref Range: 65 - 99 mg/dL 142 (H)   Bun Latest Ref Range: 8 - 22 mg/dL 20   Creatinine Latest Ref Range: 0.50 - 1.40 mg/dL 1.16   GFR If African  American Latest Ref Range: >60 mL/min/1.73 m 2 54 (A)   GFR If Non  Latest Ref Range: >60 mL/min/1.73 m 2 45 (A)   Calcium Latest Ref Range: 8.5 - 10.5 mg/dL 9.5   AST(SGOT) Latest Ref Range: 12 - 45 U/L 45   ALT(SGPT) Latest Ref Range: 2 - 50 U/L 34   Alkaline Phosphatase Latest Ref Range: 30 - 99 U/L 277 (H)   Total Bilirubin Latest Ref Range: 0.1 - 1.5 mg/dL 0.6   Albumin Latest Ref Range: 3.2 - 4.9 g/dL 3.3   Total Protein Latest Ref Range: 6.0 - 8.2 g/dL 7.0   Globulin Latest Ref Range: 1.9 - 3.5 g/dL 3.7 (H)   A-G Ratio Latest Units: g/dL 0.9   Carcinoembryonic Antigen Latest Ref Range: 0.0 - 3.0 ng/mL 1.3          Assessment/Plan:        1. Malignant neoplasm of rectum (HCC)  CT-CHEST ([facility]) WITH   2. Secondary malignant neoplasm of liver (HCC)  CT-CHEST ([facility]) WITH   3. History of pulmonary embolus (PE)  CT-CHEST ([facility]ORAX) WITH   4. Current use of long term anticoagulation  CT-CHEST (THORAX) WITH   5. SOB (shortness of breath)  CT-CHEST (THORAX) WITH   6. Cough  CT-CHEST (THORAX) WITH   7. Shortness of breath  CT-CHEST (THORAX) WITH     Plan  1.  Patient was severe shortness of breath and cough with significant fatigue.  This is very new to patient and clinically appears to be worsening.  Patient has had follow-up with regards to her respiratory status and is seen by pulmonary.  Previous CT back in May 2020 was completed which showed possible lesion within the right lower lobe lung base, however this could have also been a direct extension from a metastatic lesion that was noted in the adjacent hepatic lesion.  Subsequently patient has had significant treatment for the liver lesion.  PET/CT back in 5/14/2020 noted no abnormal finding within the lung.  However recommendation for repeat CT chest in 1 year was made which she is due for in  May.    However due to patient's most recent changes in her current clinical status with the shortness of breath cough and fatigue I will proceed with a  stat CT chest with contrast which she is scheduled for this afternoon.  Discussed the case with Dr. Steele who is in agreement with the plan.    Patient also established with cardiology and she is actually scheduled to be seen by Dr. Haque this afternoon.  Patient is very tachycardic today with a heart rate of 121.  Questioning whether this may be cardiac in nature as well and will await CT results and further discussion with Dr. Haque.    2.  Patient was a history of pulmonary embolism and has been on low-dose Xarelto for approximately 4 years.     3.  Patient with metastatic rectal carcinoma currently on single agent 5-FU.  Based on patient's current clinical status we will hold off on treatment at this time.  I have asked that infusion provide patient with normal saline with 20 KCl is 1 L.  Chemo on hold at this time until further evaluation is completed.  Dr. Steele also evaluated patient today in person and in agreement with plan.  I did review her most recent CBC, CMP and CEA.  She does have a decrease in her white blood cell count however it still within normal limits.  She is hyponatremic with a sodium level of 129.  Her alkaline phosphatase appears to be stable but elevated.  Her CEA continues to be within normal limits, trending down to 1.3.        Please note that this dictation was created using voice recognition software. I have made every reasonable attempt to correct obvious errors, but I expect that there are errors of grammar and possibly content that I did not discover before finalizing the note.

## 2021-03-09 NOTE — PROGRESS NOTES
This RN received telephone call from Ella MORALES.  Pt will not be receiving chemotherapy today.  Orders received to give patient hydration instead.  Order received for 1L NS w/ 20 mEq of K+ @ 125 mL/hr--TORB.  Orders put in supportive plan for pt.
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
## 2021-03-10 PROBLEM — F41.9 ANXIETY: Status: ACTIVE | Noted: 2021-03-10

## 2021-03-10 LAB
SARS-COV-2 RNA RESP QL NAA+PROBE: NOTDETECTED
SPECIMEN SOURCE: NORMAL
TROPONIN T SERPL-MCNC: 17 NG/L (ref 6–19)
TSH SERPL DL<=0.005 MIU/L-ACNC: 1.41 UIU/ML (ref 0.38–5.33)

## 2021-03-10 PROCEDURE — 700102 HCHG RX REV CODE 250 W/ 637 OVERRIDE(OP): Performed by: INTERNAL MEDICINE

## 2021-03-10 PROCEDURE — 94640 AIRWAY INHALATION TREATMENT: CPT | Mod: XU

## 2021-03-10 PROCEDURE — G0378 HOSPITAL OBSERVATION PER HR: HCPCS

## 2021-03-10 PROCEDURE — 97161 PT EVAL LOW COMPLEX 20 MIN: CPT

## 2021-03-10 PROCEDURE — 96374 THER/PROPH/DIAG INJ IV PUSH: CPT | Mod: XU

## 2021-03-10 PROCEDURE — 99220 PR INITIAL OBSERVATION CARE,LEVL III: CPT | Performed by: INTERNAL MEDICINE

## 2021-03-10 PROCEDURE — 94010 BREATHING CAPACITY TEST: CPT

## 2021-03-10 PROCEDURE — A9270 NON-COVERED ITEM OR SERVICE: HCPCS | Performed by: INTERNAL MEDICINE

## 2021-03-10 PROCEDURE — 93005 ELECTROCARDIOGRAM TRACING: CPT | Performed by: INTERNAL MEDICINE

## 2021-03-10 PROCEDURE — 94760 N-INVAS EAR/PLS OXIMETRY 1: CPT

## 2021-03-10 PROCEDURE — 94060 EVALUATION OF WHEEZING: CPT

## 2021-03-10 PROCEDURE — 700111 HCHG RX REV CODE 636 W/ 250 OVERRIDE (IP)

## 2021-03-10 PROCEDURE — 84484 ASSAY OF TROPONIN QUANT: CPT

## 2021-03-10 PROCEDURE — 700101 HCHG RX REV CODE 250: Performed by: INTERNAL MEDICINE

## 2021-03-10 RX ORDER — POTASSIUM CHLORIDE 20 MEQ/1
10 TABLET, EXTENDED RELEASE ORAL DAILY
Status: DISCONTINUED | OUTPATIENT
Start: 2021-03-11 | End: 2021-03-11 | Stop reason: HOSPADM

## 2021-03-10 RX ORDER — LORAZEPAM 2 MG/ML
INJECTION INTRAMUSCULAR
Status: COMPLETED
Start: 2021-03-10 | End: 2021-03-10

## 2021-03-10 RX ORDER — LEVALBUTEROL INHALATION SOLUTION 0.63 MG/3ML
0.63 SOLUTION RESPIRATORY (INHALATION)
Status: COMPLETED | OUTPATIENT
Start: 2021-03-10 | End: 2021-03-10

## 2021-03-10 RX ADMIN — LORAZEPAM: 2 INJECTION INTRAMUSCULAR; INTRAVENOUS at 01:30

## 2021-03-10 RX ADMIN — ASPIRIN 325 MG ORAL TABLET 325 MG: 325 PILL ORAL at 07:24

## 2021-03-10 RX ADMIN — RIVAROXABAN 10 MG: 10 TABLET, FILM COATED ORAL at 19:22

## 2021-03-10 RX ADMIN — POTASSIUM CHLORIDE 10 MEQ: 750 TABLET, FILM COATED, EXTENDED RELEASE ORAL at 07:43

## 2021-03-10 RX ADMIN — LEVALBUTEROL HYDROCHLORIDE 0.63 MG: 0.63 SOLUTION RESPIRATORY (INHALATION) at 09:40

## 2021-03-10 RX ADMIN — LORATADINE 10 MG: 10 TABLET ORAL at 07:27

## 2021-03-10 ASSESSMENT — COGNITIVE AND FUNCTIONAL STATUS - GENERAL
WALKING IN HOSPITAL ROOM: A LITTLE
SUGGESTED CMS G CODE MODIFIER DAILY ACTIVITY: CH
WALKING IN HOSPITAL ROOM: A LITTLE
SUGGESTED CMS G CODE MODIFIER MOBILITY: CI
MOBILITY SCORE: 23
MOBILITY SCORE: 23
SUGGESTED CMS G CODE MODIFIER MOBILITY: CI
DAILY ACTIVITIY SCORE: 24

## 2021-03-10 ASSESSMENT — LIFESTYLE VARIABLES
CONSUMPTION TOTAL: NEGATIVE
EVER HAD A DRINK FIRST THING IN THE MORNING TO STEADY YOUR NERVES TO GET RID OF A HANGOVER: NO
HAVE PEOPLE ANNOYED YOU BY CRITICIZING YOUR DRINKING: NO
DOES PATIENT WANT TO STOP DRINKING: NO
EVER FELT BAD OR GUILTY ABOUT YOUR DRINKING: NO
TOTAL SCORE: 0
TOTAL SCORE: 0
HAVE YOU EVER FELT YOU SHOULD CUT DOWN ON YOUR DRINKING: NO
ALCOHOL_USE: NO
HOW MANY TIMES IN THE PAST YEAR HAVE YOU HAD 5 OR MORE DRINKS IN A DAY: 0
AVERAGE NUMBER OF DAYS PER WEEK YOU HAVE A DRINK CONTAINING ALCOHOL: 0
ON A TYPICAL DAY WHEN YOU DRINK ALCOHOL HOW MANY DRINKS DO YOU HAVE: 0
TOTAL SCORE: 0

## 2021-03-10 ASSESSMENT — ENCOUNTER SYMPTOMS
NAUSEA: 0
BLURRED VISION: 0
WEIGHT LOSS: 0
HEMOPTYSIS: 0
VOMITING: 0
DOUBLE VISION: 0
DIZZINESS: 0
FEVER: 0
HEADACHES: 0
PALPITATIONS: 0
NECK PAIN: 0
DEPRESSION: 0
MYALGIAS: 0
SORE THROAT: 0
COUGH: 0
BRUISES/BLEEDS EASILY: 0
INSOMNIA: 0
STRIDOR: 0

## 2021-03-10 ASSESSMENT — PULMONARY FUNCTION TESTS
FEV1/FVC: 83
FVC: 2.35
VC: 2.53
FEV1/FVC: 85
PEFR: 4.52
FEV1: 1.99
FEV1: 2.1

## 2021-03-10 ASSESSMENT — PATIENT HEALTH QUESTIONNAIRE - PHQ9
2. FEELING DOWN, DEPRESSED, IRRITABLE, OR HOPELESS: NOT AT ALL
1. LITTLE INTEREST OR PLEASURE IN DOING THINGS: NOT AT ALL
SUM OF ALL RESPONSES TO PHQ9 QUESTIONS 1 AND 2: 0

## 2021-03-10 ASSESSMENT — FIBROSIS 4 INDEX: FIB4 SCORE: 3.77

## 2021-03-10 ASSESSMENT — PAIN DESCRIPTION - PAIN TYPE
TYPE: ACUTE PAIN
TYPE: CHRONIC PAIN
TYPE: CHRONIC PAIN

## 2021-03-10 ASSESSMENT — GAIT ASSESSMENTS
DISTANCE (FEET): 250
GAIT LEVEL OF ASSIST: SUPERVISED
ASSISTIVE DEVICE: FRONT WHEEL WALKER

## 2021-03-10 NOTE — CARE PLAN
Problem: Communication  Goal: The ability to communicate needs accurately and effectively will improve  Outcome: PROGRESSING AS EXPECTED    Patient very vocal on her needs and communicates them appropriately.        Problem: Respiratory:  Goal: Respiratory status will improve  Outcome: PROGRESSING AS EXPECTED    Patient not complaining of shortness of breath at rest. Will do a walking pulse ox assessment on her this morning to see if she is in need of more oxygen support.

## 2021-03-10 NOTE — H&P
[facility] Medicine History & Physical Note    Date of Service  3/10/2021    Primary Care Physician  Manan Quiñonez M.D.    Consultants    Code Status  Full Code    Chief Complaint  Chief Complaint   Patient presents with   • Shortness of Breath       History of Presenting Illness  Karleen Walters is a 80 y.o. female who presents with shortness of breath.  The patient has a known history of metastatic colon cancer.  She states recently found that has most likely metastasized to the lung and the liver.  She is currently undergoing chemotherapy.  Over the last several days she is had progressive increased work of breathing.  She states when she exerts herself she can barely breathe and when she coughs she has extreme increased work of breathing.  She has not had any associated fever.  She does have a cough that is dry.  She does not have any pain or swelling to her lower extremities.  She was contacted by cardiology instructed to come into the emergency department for work-up.  She is currently on anticoagulation.  At bedside she is at rest and comfortable speaking full sentences but she complains of palpitations, tachycardia, shortness of breath with minimal exertion and lightheadedness with near falls.  She denies recent change of medication regiment.  She acknowledges some anxiety    Review of Systems  Review of Systems   Constitutional: Negative for fever, malaise/fatigue and weight loss.   HENT: Negative for sore throat and tinnitus.    Eyes: Negative for blurred vision and double vision.   Respiratory: Negative for cough, hemoptysis and stridor.    Cardiovascular: Negative for chest pain and palpitations.   Gastrointestinal: Negative for nausea and vomiting.   Genitourinary: Negative for dysuria and urgency.   Musculoskeletal: Negative for myalgias and neck pain.   Skin: Negative for itching and rash.   Neurological: Negative for dizziness and headaches.   Endo/Heme/Allergies: Does not bruise/bleed easily.    Psychiatric/Behavioral: Negative for depression. The patient does not have insomnia.        Past Medical History   has a past medical history of Adverse effect of anesthesia, Anesthesia, Arrhythmia, Blood clotting disorder (HCC) (08/2015), Blood transfusion (1957), Breath shortness, Cancer (HCC) (09/2012), CATARACT, Chemotherapy-induced neutropenia (HCC) (9/28/2016), Chickenpox, Colon cancer (HCC), Dental disorder, Elevated alkaline phosphatase level (11/15/2017), Emphysema of lung (HCC), Fall, Indian measles, Heart murmur, Hemorrhagic disorder (HCC), High cholesterol, Hypercholesteremia, Hypertension, Ileostomy in place (HCC), Ileostomy in place (HCC), Indigestion, Influenza, Lightheadedness (9/17/2015), Mumps, Obstruction, Other specified symptom associated with female genital organs, Pneumonia (05/2017), Pulmonary embolism (HCC), 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    Surgical History   has a past surgical history that includes tonsillectomy; abdominal hysterectomy total (1983); eye surgery; cyst excision (1972); colonoscopy flex w/endo us (9/20/2012); laparoscopy (10/8/2012); ileo loop diversion (10/8/2012); fistula in ano repair (2/27/2013); biopsy general (7/17/2013); recovery (8/26/2013); fistula in ano repair (9/4/2013); flap rotation (9/4/2013); irrigation & debridement general (10/23/2013); perineal procedure (12/18/2013); myocutaneous flap (12/18/2013); cataract extraction with iol (2004); irrigation & debridement general (2/19/2014); flap graft (2/19/2014); fistula in ano repair (10/15/2014); perineal procedure (10/15/2014); fistula in ano repair (1/28/2015); perineal procedure (1/28/2015); hepatic ablation laparoscopic (7/6/2015); cath placement (Left, 7/6/2015); colonoscopy with biopsy  "(8/23/2015); pr remv 2nd cataract,corn-scler sectn; hysterectomy laparoscopy; colectomy (9/26/2012); sigmoidoscopy (2/27/2013); proctoscopy (7/17/2013); colon resection; other (2009); hepatic ablation laparoscopic (11/15/2018); and hepatic ablation (5/28/2020).     Family History  family history includes Alcohol/Drug in her brother; Cancer in her brother and mother; Cancer (age of onset: 60) in her maternal aunt; Heart Disease in her mother; Heart Failure in her mother; Hyperlipidemia in her mother; Hypertension in her mother; Kidney Disease in her father; Lung Disease in her brother.     Social History   reports that she has never smoked. She has never used smokeless tobacco. She reports that she does not drink alcohol and does not use drugs.    Allergies  Allergies   Allergen Reactions   • Oxaliplatin Anaphylaxis   • Codeine      \"gets drunk\"   • Pcn [Penicillins] Itching     itching   • Sulfa Drugs Itching     itching   • Tape Rash     PAPER TAPE OK       Medications  Prior to Admission Medications   Prescriptions Last Dose Informant Patient Reported? Taking?   Cholecalciferol (VITAMIN D3) 400 UNITS CAPS  Patient Yes No   Sig: Take 1 Cap by mouth every day.   Multiple Vitamins-Minerals (CENTRUM SILVER PO)  Patient Yes No   Sig: Take 1 Tab by mouth every day.   Tiotropium Bromide-Olodaterol (STIOLTO RESPIMAT) 2.5-2.5 MCG/ACT Aero Soln   No No   Sig: Take 2 Puffs by mouth every day.   Tiotropium Bromide-Olodaterol (STIOLTO RESPIMAT) 2.5-2.5 MCG/ACT Aero Soln   No No   Sig: Take 2 Puffs by mouth every day.   albuterol 108 (90 Base) MCG/ACT Aero Soln inhalation aerosol   No No   Sig: Inhale 2 Puffs by mouth every 6 hours as needed for Shortness of Breath.   cyanocobalamin (VITAMIN B-12) 500 MCG Tab  Patient Yes No   Sig: Take 1,000 mcg by mouth every day.   furosemide (LASIX) 40 MG Tab   Yes No   lidocaine-prilocaine (EMLA) 2.5-2.5 % Cream   No No   Sig: Apply to port 1 hour prior to access of port and cover with " plastic wrap.   loratadine (CLARITIN) 10 MG TABS  Patient Yes No   Sig: Take 10 mg by mouth every day. Indications: Hayfever   metronidazole (METROCREAM) 0.75 % cream  Patient Yes No   Sig: Apply 1 Each to affected area(s) 2 times a day.   ondansetron (ZOFRAN) 4 MG Tab tablet   No No   Sig: Take 1 Tab by mouth every four hours as needed.   oxyCODONE immediate-release (ROXICODONE) 5 MG Tab   No No   Sig: Take 1 Tab by mouth every 6 hours for 30 days.   potassium chloride ER (KLOR-CON) 10 MEQ tablet   No No   Sig: Take 1 Tab by mouth every day.   rivaroxaban (XARELTO) 10 MG Tab tablet   No No   Sig: Take 1 Tab by mouth every day.      Facility-Administered Medications: None       Physical Exam  Temp:  [36.5 °C (97.7 °F)] 36.5 °C (97.7 °F)  Pulse:  [] 84  Resp:  [22-38] 38  BP: (124-146)/(64-78) 124/64  SpO2:  [95 %-99 %] 99 %    Physical Exam  Vitals and nursing note reviewed.   Constitutional:       General: She is not in acute distress.     Appearance: Normal appearance. She is normal weight. She is not toxic-appearing.   HENT:      Head: Normocephalic and atraumatic.      Nose: Nose normal. No congestion or rhinorrhea.      Mouth/Throat:      Mouth: Mucous membranes are moist.      Pharynx: Oropharynx is clear.   Eyes:      Extraocular Movements: Extraocular movements intact.      Conjunctiva/sclera: Conjunctivae normal.      Pupils: Pupils are equal, round, and reactive to light.   Neck:      Vascular: No carotid bruit.   Cardiovascular:      Rate and Rhythm: Normal rate and regular rhythm.      Pulses: Normal pulses.      Heart sounds: Normal heart sounds. No murmur. No gallop.    Pulmonary:      Effort: No respiratory distress.      Breath sounds: Normal breath sounds. No wheezing or rales.   Abdominal:      General: Abdomen is flat. Bowel sounds are normal. There is no distension.      Palpations: Abdomen is soft. There is no mass.      Tenderness: There is no abdominal tenderness.      Hernia: No hernia  is present.   Musculoskeletal:         General: No tenderness or signs of injury.      Cervical back: Normal range of motion and neck supple. No muscular tenderness.   Lymphadenopathy:      Cervical: No cervical adenopathy.   Skin:     Capillary Refill: Capillary refill takes less than 2 seconds.      Coloration: Skin is not jaundiced or pale.      Findings: No bruising.   Neurological:      General: No focal deficit present.      Mental Status: She is alert and oriented to person, place, and time. Mental status is at baseline.      Cranial Nerves: No cranial nerve deficit.      Motor: No weakness.      Coordination: Coordination normal.   Psychiatric:         Mood and Affect: Mood normal.         Thought Content: Thought content normal.         Judgment: Judgment normal.         Laboratory:  Recent Labs     03/09/21  0730   WBC 4.2*   RBC 4.51   HEMOGLOBIN 12.2   HEMATOCRIT 38.9   MCV 86.3   MCH 27.1   MCHC 31.4*   RDW 58.2*   PLATELETCT 186   MPV 10.5     Recent Labs     03/09/21  0730 03/09/21 2025   SODIUM 129* 130*   POTASSIUM 4.0 4.2   CHLORIDE 97 97   CO2 17* 21   GLUCOSE 142* 91   BUN 20 23*   CREATININE 1.16 0.99   CALCIUM 9.5 9.1     Recent Labs     03/09/21  0730 03/09/21 2025   ALTSGPT 34 38   ASTSGOT 45 54*   ALKPHOSPHAT 277* 241*   TBILIRUBIN 0.6 0.5   GLUCOSE 142* 91         Recent Labs     03/09/21 2025   NTPROBNP 229*         Recent Labs     03/09/21 2025   TROPONINT 15       Imaging:  EC-ECHOCARDIOGRAM COMPLETE W/O CONT    (Results Pending)         Assessment/Plan:  I anticipate this patient will require at least two midnights for appropriate medical management, necessitating inpatient admission.    Shortness of breath- (present on admission)  Assessment & Plan  Unclear cause, on 5-FU.  Follow-up telemetry monitoring, echocardiogram, ambulating pulse oximetry   And PFTs      History of pulmonary embolus (PE)- (present on admission)  Assessment & Plan  Hemodynamically stable, continue outpatient  Amanda    Falls  Assessment & Plan  Follow-up PT consult    Anxiety  Assessment & Plan  Reassurance, trial trazodone

## 2021-03-10 NOTE — FLOWSHEET NOTE
03/10/21 0941   Pulmonary Function Group   $ Bedside PFT Screen Pre/Post Yes   Pre: PEF 4.52   Pre: FEV1 2.1   Pre: VC 2.53   Pre: FEV1/FVC Ratio 83   Post: PEF 4.44   Post: FEV1 1.99   Post: FVC 2.35   Post: FEV1/FVC Ratio 85       Pre post pft results

## 2021-03-10 NOTE — ASSESSMENT & PLAN NOTE
CT chest: There is a 1.7 x 2.6 cm nodular opacity in the right lung base of crossing from the liver lesion. This opacity has changed in size with the ablation, previously measured 1.6 x 2.5 cm in 5/7/ 2020 and 0.8 x 1.0 cm in 10/5/2020. This is suspected to be   burned area adjacent to ablation. Continued follow-up is recommended; Partially visualized ablation cavity in the hepatic dome measuring 4.4 x 6.3 cm, previously 4.3 x 6.8 cm

## 2021-03-10 NOTE — ED PROVIDER NOTES
ED Provider Note    CHIEF COMPLAINT  Chief Complaint   Patient presents with   • Shortness of Breath       HPI  Karlene Walters is a 80 y.o. female who presents with shortness of breath.  The patient has a known history of metastatic colon cancer.  She states recently found that has most likely metastasized to the lung and the liver.  She is currently undergoing chemotherapy.  Over the last several days she is had progressive increased work of breathing.  She states when she exerts herself she can barely breathe and when she coughs she has extreme increased work of breathing.  She has not had any associated fever.  She does have a cough that is dry.  She does not have any pain or swelling to her lower extremities.  She was contacted by cardiology instructed to come into the emergency department for work-up.  She is currently on anticoagulation.    REVIEW OF SYSTEMS  See HPI for further details. All other systems are negative.     PAST MEDICAL HISTORY  Past Medical History:   Diagnosis Date   • Restless legs 5/6/2020   • Elevated alkaline phosphatase level 11/15/2017   • Pneumonia 05/2017    tx'd with antibx   • Chemotherapy-induced neutropenia (HCC) 9/28/2016   • Routine general medical examination at a health care facility 3/14/2016    3/14/16   • Renal insufficiency 3/14/2016   • Lightheadedness 9/17/2015   • Blood clotting disorder (HCC) 08/2015    bilat PE    • Cancer (HCC) 09/2012    rectal cancer,  Liver CA-2015   • Blood transfusion 1957   • Adverse effect of anesthesia     elevated BP postop   • Anesthesia    • Arrhythmia     occasional   • Breath shortness     pt reports d/t chemo treatment; no O2; with moderate exertion; no c/o at this time   • CATARACT     removed b/l   • Chickenpox     history of   • Colon cancer (HCC)    • Dental disorder     dentures UPPERS AND LOWERS   • Emphysema of lung (HCC)     COPD   • Fall    • [place] measles     history of   • Heart murmur     as a child   • Hemorrhagic  disorder (HCC)     on Xarelto    • High cholesterol    • Hypercholesteremia    • Hypertension     no meds currently    • Ileostomy in place (HCC)    • Ileostomy in place (HCC)    • Indigestion    • Influenza     history of   • Mumps     history of   • Obstruction     ileostomy   • Other specified symptom associated with female genital organs     HYSTERECTOMY 1993   • Pulmonary embolism (HCC)    • 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)    • Seasonal allergies    • Swelling of both ankles     Lasix PRN   • Tonsillitis        FAMILY HISTORY  [unfilled]    SOCIAL HISTORY  Social History     Socioeconomic History   • Marital status:      Spouse name: Not on file   • Number of children: Not on file   • Years of education: Not on file   • Highest education level: Not on file   Occupational History   • Not on file   Tobacco Use   • Smoking status: Never Smoker   • Smokeless tobacco: Never Used   Substance and Sexual Activity   • Alcohol use: No   • Drug use: No   • Sexual activity: Never     Partners: Male     Birth control/protection: Post-Menopausal   Other Topics Concern   • Primary/coprimary nurse & associates Not Asked   • Family contact information Not Asked   • OK to release patient information to the following Not Asked   • Patient preferred routine/Privacy concerns Not Asked   • Patient likes and dislikes Not Asked   • Participating In Research Study Not Asked   • Miscellaneous Not Asked   Social History Narrative   • Not on file     Social Determinants of Health     Financial Resource Strain: Low Risk    • Difficulty of Paying Living Expenses: Not hard at all   Food Insecurity: No Food Insecurity   • Worried About Running Out of Food in the Last Year: Never true   • Ran Out of Food in the Last Year: Never true   Transportation Needs: No Transportation Needs   • Lack of Transportation (Medical): No   • Lack of Transportation (Non-Medical): No   Physical Activity:    • Days of Exercise per Week:    •  Minutes of Exercise per Session:    Stress:    • Feeling of Stress :    Social Connections:    • Frequency of Communication with Friends and Family:    • Frequency of Social Gatherings with Friends and Family:    • Attends [employer] Services:    • Active Member of Clubs or Organizations:    • Attends Club or Organization Meetings:    • Marital Status:    Intimate Partner Violence:    • Fear of Current or Ex-Partner:    • Emotionally Abused:    • Physically Abused:    • Sexually Abused:        SURGICAL HISTORY  Past Surgical History:   Procedure Laterality Date   • HEPATIC ABLATION  5/28/2020    Procedure: ABLATION, LESION, LIVER-TRISEGMENTECTOMY, MICROWAVE ABLATION, INTRA OPERATIVE ULTRA SOUND, SIMPLE RESECTED / REPAIR OF DIAPHRAGM;  Surgeon: Kit West M.D.;  Location: SURGERY [facility];  Service: General   • HEPATIC ABLATION LAPAROSCOPIC  11/15/2018    Procedure: HEPATIC ABLATION LAPAROSCOPIC.- SEGMENT 7/8;  Surgeon: Kit West M.D.;  Location: SURGERY [facility];  Service: General   • COLONOSCOPY WITH BIOPSY  8/23/2015    Procedure: COLONOSCOPY WITH BIOPSY;  Surgeon: Wilbur Glasgow M.D.;  Location: ENDOSCOPY [facility];  Service:    • HEPATIC ABLATION LAPAROSCOPIC  7/6/2015    Procedure: HEPATIC ABLATION LAPAROSCOPIC.;  Surgeon: Kit West M.D.;  Location: SURGERY [facility];  Service:    • CATH PLACEMENT Left 7/6/2015    Procedure: CATH PLACEMENT CEPHALIC POWERPORT;  Surgeon: Kit West M.D.;  Location: SURGERY [facility];  Service:    • FISTULA IN ANO REPAIR  1/28/2015    Performed by Kolton Montgomery M.D. at [facility]   • PERINEAL PROCEDURE  1/28/2015    Performed by Kolton Montgomery M.D. at [facility]   • FISTULA IN ANO REPAIR  10/15/2014    Performed by Kolton Montgomery M.D. at [facility]   • PERINEAL PROCEDURE  10/15/2014    Performed by Kolton Montgomery M.D. at [facility]   •  IRRIGATION & DEBRIDEMENT GENERAL  2/19/2014    Performed by Kolton Montgomery M.D. at SURGERY [facility]   • FLAP GRAFT  2/19/2014    Performed by Kolton Montgomery M.D. at [facility]   • PERINEAL PROCEDURE  12/18/2013    Performed by Kolton Montgomery M.D. at [facility]   • MYOCUTANEOUS FLAP  12/18/2013    Performed by Kolton Montgomery M.D. at SURGERY [facility]   • IRRIGATION & DEBRIDEMENT GENERAL  10/23/2013    Performed by Kolton Montgomery M.D. at [facility]   • FISTULA IN ANO REPAIR  9/4/2013    Performed by Kolton Montgomery M.D. at [facility]   • FLAP ROTATION  9/4/2013    Performed by Kolton Montgomery M.D. at [facility]   • RECOVERY  8/26/2013    Performed by Cath-Recovery Surgery at [facility] SAME DAY [facility]   • BIOPSY GENERAL  7/17/2013    Performed by Kolton Montgomery M.D. at [facility]   • PROCTOSCOPY  7/17/2013    Performed by Kolton Montgomery M.D. at [facility]   • FISTULA IN ANO REPAIR  2/27/2013    Performed by Kolton Montgomery M.D. at [facility]   • SIGMOIDOSCOPY  2/27/2013    Performed by Kolton Montgomery M.D. at [facility]   • LAPAROSCOPY  10/8/2012    Performed by Kolton Montgomery M.D. at [facility]   • ILEO LOOP DIVERSION  10/8/2012    Performed by Kolton Montgomery M.D. at [facility]   • COLECTOMY  9/26/2012    Performed by Kolton Montgomery M.D. at [facility]   • COLONOSCOPY FLEX W/ENDO US  9/20/2012    Performed by Harshil Bolton M.D. at [facility]   • OTHER  2009    left eye torn retina repair   • CATARACT EXTRACTION WITH IOL  2004    bilateral   • ABDOMINAL HYSTERECTOMY TOTAL  1983   • CYST EXCISION  1972    ovarian   • COLON RESECTION     • EYE SURGERY      cataracts   • HYSTERECTOMY LAPAROSCOPY     • PB REMV 2ND CATARACT,CORN-SCLER SECTN     • TONSILLECTOMY         CURRENT MEDICATIONS  Home Medications    **Home medications have not yet been reviewed for  "this encounter**         ALLERGIES  Allergies   Allergen Reactions   • Oxaliplatin Anaphylaxis   • Codeine      \"gets drunk\"   • Pcn [Penicillins] Itching     itching   • Sulfa Drugs Itching     itching   • Tape Rash     PAPER TAPE OK       PHYSICAL EXAM  VITAL SIGNS: /72   Pulse 97   Temp 36.5 °C (97.7 °F) (Temporal)   Resp (!) 35   Ht 1.676 m (5' 6\")   Wt 68 kg (150 lb)   LMP  (LMP Unknown)   SpO2 97%   BMI 24.21 kg/m²       Constitutional: Slightly cachectic but no acute distress  HENT: Normocephalic, Atraumatic, Bilateral external ears normal, Oropharynx moist, No oral exudates, Nose normal.   Eyes: PERRLA, EOMI, Conjunctiva normal, No discharge.   Neck: Normal range of motion, No tenderness, Supple, No stridor.   Lymphatic: No lymphadenopathy noted.   Cardiovascular: Normal heart rate, Normal rhythm, No murmurs, No rubs, No gallops.   Thorax & Lungs: Symmetrically diminished throughout, No respiratory distress, No wheezing, No chest tenderness.   Abdomen: Bowel sounds normal, Soft, No tenderness, No masses, No pulsatile masses.   Skin: Warm, Dry, No erythema, No rash.   Back: No tenderness, No CVA tenderness.   Extremities: Intact distal pulses, No edema, No tenderness, No cyanosis, No clubbing.    Neurologic: Alert & oriented x 3, Normal motor function, Normal sensory function, No focal deficits noted.   Psychiatric: Affect normal, Judgment normal, Mood normal.     Results for orders placed or performed during the hospital encounter of 03/09/21   Comp Metabolic Panel   Result Value Ref Range    Sodium 130 (L) 135 - 145 mmol/L    Potassium 4.2 3.6 - 5.5 mmol/L    Chloride 97 96 - 112 mmol/L    Co2 21 20 - 33 mmol/L    Anion Gap 12.0 7.0 - 16.0    Glucose 91 65 - 99 mg/dL    Bun 23 (H) 8 - 22 mg/dL    Creatinine 0.99 0.50 - 1.40 mg/dL    Calcium 9.1 8.5 - 10.5 mg/dL    AST(SGOT) 54 (H) 12 - 45 U/L    ALT(SGPT) 38 2 - 50 U/L    Alkaline Phosphatase 241 (H) 30 - 99 U/L    Total Bilirubin 0.5 0.1 - " 1.5 mg/dL    Albumin 3.3 3.2 - 4.9 g/dL    Total Protein 6.4 6.0 - 8.2 g/dL    Globulin 3.1 1.9 - 3.5 g/dL    A-G Ratio 1.1 g/dL   TROPONIN   Result Value Ref Range    Troponin T 15 6 - 19 ng/L   proBrain Natriuretic Peptide, NT   Result Value Ref Range    NT-proBNP 229 (H) 0 - 125 pg/mL   ESTIMATED GFR   Result Value Ref Range    GFR If African American >60 >60 mL/min/1.73 m 2    GFR If Non African American 54 (A) >60 mL/min/1.73 m 2     *Note: Due to a large number of results and/or encounters for the requested time period, some results have not been displayed. A complete set of results can be found in Results Review.       COURSE & MEDICAL DECISION MAKING  Pertinent Labs & Imaging studies reviewed. (See chart for details)  This an 80-year-old female who presents to the emergency department with exertional dyspnea.  She was evaluate by the cardiologist today and sent in here for further work-up.  The patient had a CT scan performed earlier I did review the results and there is no evidence of edema, infiltrates, nor source that would cause her presenting symptoms.  She did have some nodular opacities please to the radiologist report.  She also had a CBC performed earlier and that was reviewed.  She has a stable anemia but no other significant acute changes.  Her BNP is at 229 therefore heart failure to be unlikely.  Her troponin is 15 and her EKG is reviewed from earlier and does not show any ischemic change.  At this time it will have a clear source.  The patient states Dr. Lucio her cardiologist wanted her admitted for evaluation due to the profound exertional dyspnea.  Therefore the patient admitted to the hospitalist with cardiology in consultation as needed.    FINAL IMPRESSION  1.  Exertional dyspnea   2.  Metastatic colon cancer    Disposition  The patient will be admitted in stable condition         Electronically signed by: Olman Sales M.D., 3/9/2021 7:47 PM

## 2021-03-10 NOTE — PROGRESS NOTES
2 RN skin check complete.   Devices in place: right lower quad ileostomy, left chest power port accessed    Skin assessed under devices: Clean, dry, intact. Stoma protruding and red; pt states as baseline.  Confirmed pressure ulcers found on: N/A.  New potential pressure ulcers noted on N/A.     Wound consult placed 3/10 for ileostomy care for admission.    Heels pink and blanching; Feet cool, red and blanching;  bilat ears pink/blanching. Small healed over scratch noted on lower sacrum.     The following interventions in place: Non-skid socks in place, pillows offered for positioning and comfort, Pt emptied and cleaned bag independently.

## 2021-03-10 NOTE — PROGRESS NOTES
[facility] Oncology Office    Patient seen in the oncology office yesterday morning for prechemotherapy appointment for her single agent 5-FU treatment which she receives every 2 weeks for her metastatic colorectal cancer to liver. Patient was scheduled for cycle 126. Upon initiation of treatment patient was significantly tachycardic with a heart rate of 121 and respirations at 24.  She was in apparent respiratory distress with significant shortness of breath.  She did have improvement over time with rest, however minimal movement would exacerbate her shortness of breath.  Per patient she has had worsening shortness of breath over the last 2 weeks with significant fatigue.  She notes that her appetite has decreased and has had a 2 pound weight loss in the last 2 weeks.  Her ostomy output has been more liquid than normal.    Patient's chemotherapy was placed on hold yesterday due to clinical status.  She was given just under a liter of NS with 20 meq KCl slowly in infusion center.  Due to concern for either respiratory or cardiac issues with her symptoms, stat CT chest was ordered and completed yesterday afternoon.  Patient also had a follow up with cardiologist, Dr. Haque, yesterday as well and he encouraged patient to proceed to the emergency department.    Patient's labs did note that her CEA is trending down to 1.3.  CT chest stable with no evidence of disease.  Based on current findings this appears to not be related to malignancy.    Patient currently being worked up by cardiology and awaiting echocardiogram. Pulmonary function test completed this morning.     Patient stated she feels well, however she does still have significant shortness of breath with minimal exertion.  It was noticeable today in conversation after just a few words in discussion she began to have shortness of breath with a dry cough.    Cancer History  Patient with a long history of rectal carcinoma initially diagnosed in 2012.  She was  found to have metastatic disease to liver and lung in 2015.  She is status post liver ablation as well as has received Y 90 in the past.  She was on XELOX initially but had allergic reaction to oxaliplatin (last dose 7/28/15) and was switched to single agent 5-FU. Patient initially on 5-FU at 2400 mg/m2 with Leucovorin and 5-FU push starting on 10/27/15, followed by 20% dose reduction to 1920 mg/m2 for cycle 18 (7/5/16), followed by deletion of Leucovorin and 5-FU push on 10/31/17. In January 2020 patient was changed to every 3-week cycle at cycle #99 ([place] #91) and then changed to every 4-week interval in May 2020.  It was at that time there was noted to have growth of disease within the liver and was seen by Dr. West, surgeon.  Patient did undergo surgery in hopes of resection of the tumor but it was felt to be too close to the hilum and therefore she did undergo ablation instead on 5/28/20.  She has been back on single agent 5-FU at every 2-week interval, restarted on 6/16/20. Last CT 10/06/20 showed the tumor ablation cavities within the right lobe of the liver have decreased in size since previous examination.  There is a 1.5 x 3.1 cm focal area of enhancement adjacent to the ablation cavity that appears to have increased concerning for possible tumor recurrence.  Based on that finding she was sent for an MRI for further evaluation on 10/8/2020 which confirmed that the appearance of the masses noted in the liver did not have an appearance for suspicious finding for recurrent metastatic disease.  MRI of the abdomen completed on 1/11/2021 showed changes consistent with a partial right hepatectomy.  The ablation cavity showed no enhancement, alteration of diffusion signal or change, and therefore the reading radiologist stated this is noted to be a complete response to therapy.  MRI does also note a very stable 2.2 x 2.1 cm enhancing right cardiophrenic angle mass.  Questionable whether this is either  adenopathy versus less likely a pulmonary parenchymal mass.  Patient does follow-up with pulmonary scheduled in May with a repeat CT chest at that time, however CT completed 3/9/2021 due to symptoms.  Per surgeon and Dr. Steele we will continue to monitor patient with a follow-up MRI abdomen in 3 months, 4/5/21.  Plan is to continue on single agent 5-FU.      CT-CHEST (THORAX) WITH    Result Date: 3/9/2021  3/9/2021 4:02 PM HISTORY/REASON FOR EXAM:  Cough; Shortness of breath; patient with metastatic rectal carcinoma with severe SOB and cough; patient with metastatic rectal carcinoma with severe SOB and cough TECHNIQUE/EXAM DESCRIPTION: CT scan of the chest with contrast. Thin-section helical images were obtained from the lung apices through the adrenal glands following the bolus administration of 75 mL of Omnipaque 350 nonionic contrast. Low dose optimization technique was utilized for this CT exam including automated exposure control and adjustment of the mA and/or kV according to patient size. COMPARISON:  5/7/2020. 10/5/2020. MR 1/11/2021 FINDINGS: Lungs: There is a 1.7 x 2.6 cm nodular opacity in the right lung base of crossing from the liver lesion, previously measured 1.6 x 2.5 cm in 5/7/ 2020 and 0.8 x 1.0 cm in 10/5/2020. Airway: Patent Pleura: No pleural effusion or pneumothorax. Thoracic aorta and great vessels:  No aneurysm. Pulmonary arteries: . Heart and pericardium:   No significant coronary artery calcification. No pericardial effusion. Lymph nodes: Grossly unchanged right pericardial lymph nodes, measuring 1.3 cm Thoracic spine:  No fracture or malalignment. No compression deformity. Chest wall:   Unremarkable. Visualized abdomen: Partially visualized ablation cavity in the hepatic dome measuring 4.4 x 6.3 cm, previously 4.3 x 6.8 cm ___________________________________     1. No consolidation to suggest pneumonia. 2. There is a 1.7 x 2.6 cm nodular opacity in the right lung base of crossing from the  liver lesion. This opacity has changed in size with the ablation, previously measured 1.6 x 2.5 cm in 5/7/ 2020 and 0.8 x 1.0 cm in 10/5/2020. This is suspected to be burned area adjacent to ablation. Continued follow-up is recommended. 3. Partially visualized ablation cavity in the hepatic dome measuring 4.4 x 6.3 cm, previously 4.3 x 6.8 cm         Plan  We will await further work-up from cardiology.  At this time her chemotherapy is on hold while further work-up is completed.    Patient to return to office as scheduled in 2 weeks as we will hold this cycle of chemotherapy.    We will continue to monitor from the periphery and are of course are available for any questions or concerns.

## 2021-03-10 NOTE — ASSESSMENT & PLAN NOTE
For shortness of breath patient reported a history of colon cancer since 2010, s/p the surgery  Colon cancer recurrence in 2015, patient has been chemotherapy since then  She is due for chemo, which is on hold due to worsening shortness of breath  Her oncologist to refer her to hospital for further work-up for shortness of breath before next chemo

## 2021-03-10 NOTE — PROGRESS NOTES
Received report from ED RN, Ermelinda. Assumed care of pt. Pt reports no needs at this time. Transported up to unit via gurney and Hashtrack monitor with ACLS RN's. Updated pt on plan of care. Pt resting comfortably in bed. States no pain or desire for pain medication at this time. Educated on use of call light. Oriented to room. Placed on tele box and monitor room notified. Confirmed to be SR 90's. Hourly rounding and continuous monitoring in place.

## 2021-03-10 NOTE — ED NOTES
Clarified with Dr. Sales if he wanted EKG. Pt had one today with Cardiology @ 1909 . Per ERP ok to cancel EKG orders

## 2021-03-10 NOTE — PROGRESS NOTES
Chief Complaint   Patient presents with   • Hypertension       Subjective:   Karlene Walters is a 79 y.o. female who presents today as a follow-up for her PE heart murmur shortness of breath and lower extremity edema.  Since he was last seen she was seen in the oncology center today but she was noted to be profoundly short of breath.  She has been saying that she is been getting worsening exertional shortness of breath.  Her resting heart rate today shows a sinus tach at 110.  She had a CT scan today which I reviewed which shows no PE.  There is no pericardial effusion noted.  She had no coronary calcifications noted either.      Past Medical History:   Diagnosis Date   • Adverse effect of anesthesia     elevated BP postop   • Anesthesia    • Arrhythmia     occasional   • Blood clotting disorder (HCC) 08/2015    bilat PE    • Blood transfusion 1957   • Breath shortness     pt reports d/t chemo treatment; no O2; with moderate exertion; no c/o at this time   • Cancer (HCC) 09/2012    rectal cancer,  Liver CA-2015   • CATARACT     removed b/l   • Chemotherapy-induced neutropenia (HCC) 9/28/2016   • Chickenpox     history of   • Colon cancer (HCC)    • Dental disorder     dentures UPPERS AND LOWERS   • Elevated alkaline phosphatase level 11/15/2017   • Emphysema of lung (HCC)     COPD   • Fall    • [place] measles     history of   • Heart murmur     as a child   • Hemorrhagic disorder (HCC)     on Xarelto    • High cholesterol    • Hypercholesteremia    • Hypertension     no meds currently    • Ileostomy in place (HCC)    • Ileostomy in place (HCC)    • Indigestion    • Influenza     history of   • Lightheadedness 9/17/2015   • Mumps     history of   • Obstruction     ileostomy   • Other specified symptom associated with female genital organs     HYSTERECTOMY 1993   • Pneumonia 05/2017    tx'd with antibx   • Pulmonary embolism (HCC)    • 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)    • Renal insufficiency  3/14/2016   • Restless legs 5/6/2020   • Routine general medical examination at a health care facility 3/14/2016    3/14/16   • Seasonal allergies    • Swelling of both ankles     Lasix PRN   • Tonsillitis      Past Surgical History:   Procedure Laterality Date   • HEPATIC ABLATION  5/28/2020    Procedure: ABLATION, LESION, LIVER-TRISEGMENTECTOMY, MICROWAVE ABLATION, INTRA OPERATIVE ULTRA SOUND, SIMPLE RESECTED / REPAIR OF DIAPHRAGM;  Surgeon: Kit West M.D.;  Location: SURGERY [facility];  Service: General   • HEPATIC ABLATION LAPAROSCOPIC  11/15/2018    Procedure: HEPATIC ABLATION LAPAROSCOPIC.- SEGMENT 7/8;  Surgeon: Kit West M.D.;  Location: SURGERY [facility];  Service: General   • COLONOSCOPY WITH BIOPSY  8/23/2015    Procedure: COLONOSCOPY WITH BIOPSY;  Surgeon: Wilbur Glasgow M.D.;  Location: ENDOSCOPY [facility];  Service:    • HEPATIC ABLATION LAPAROSCOPIC  7/6/2015    Procedure: HEPATIC ABLATION LAPAROSCOPIC.;  Surgeon: Kit West M.D.;  Location: SURGERY [facility];  Service:    • CATH PLACEMENT Left 7/6/2015    Procedure: CATH PLACEMENT CEPHALIC POWERPORT;  Surgeon: Kit West M.D.;  Location: SURGERY [facility];  Service:    • FISTULA IN ANO REPAIR  1/28/2015    Performed by Kolton Montgomery M.D. at [facility]   • PERINEAL PROCEDURE  1/28/2015    Performed by Kolton Montgomery M.D. at SURGERY [facility]   • FISTULA IN ANO REPAIR  10/15/2014    Performed by Kolton Montgomery M.D. at [facility]   • PERINEAL PROCEDURE  10/15/2014    Performed by Kolton Montgomery M.D. at [facility]   • IRRIGATION & DEBRIDEMENT GENERAL  2/19/2014    Performed by Kolton Montgomery M.D. at [facility]   • FLAP GRAFT  2/19/2014    Performed by Kolton Montgomery M.D. at [facility]   • PERINEAL PROCEDURE  12/18/2013    Performed by Kolton Montgomery M.D. at [facility]   • MYOCUTANEOUS FLAP   12/18/2013    Performed by Kolton Montgomery M.D. at SURGERY [facility]   • IRRIGATION & DEBRIDEMENT GENERAL  10/23/2013    Performed by Kolton Montgomery M.D. at SURGERY [facility]   • FISTULA IN ANO REPAIR  9/4/2013    Performed by Kolton Montgomery M.D. at SURGERY [facility]   • FLAP ROTATION  9/4/2013    Performed by Kolton Montgomery M.D. at SURGERY [facility]   • RECOVERY  8/26/2013    Performed by [facility] SAME DAY [facility]   • BIOPSY GENERAL  7/17/2013    Performed by Kolton Montgomery M.D. at SURGERY [facility]   • PROCTOSCOPY  7/17/2013    Performed by Kolton Montgomery M.D. at SURGERY [facility]   • FISTULA IN ANO REPAIR  2/27/2013    Performed by Kolton Montgomery M.D. at SURGERY [facility]   • SIGMOIDOSCOPY  2/27/2013    Performed by Kolton Montgomery M.D. at SURGERY [facility]   • LAPAROSCOPY  10/8/2012    Performed by Kolton Montgomery M.D. at SURGERY [facility]   • ILEO LOOP DIVERSION  10/8/2012    Performed by Kolton Montgomery M.D. at SURGERY [facility]   • COLECTOMY  9/26/2012    Performed by Kolton Montgomery M.D. at [facility]   • COLONOSCOPY FLEX W/ENDO US  9/20/2012    Performed by Harshil Bolton M.D. at SURGERY [facility]   • OTHER  2009    left eye torn retina repair   • CATARACT EXTRACTION WITH IOL  2004    bilateral   • ABDOMINAL HYSTERECTOMY TOTAL  1983   • CYST EXCISION  1972    ovarian   • COLON RESECTION     • EYE SURGERY      cataracts   • HYSTERECTOMY LAPAROSCOPY     • PB REMV 2ND CATARACT,CORN-SCLER SECTN     • TONSILLECTOMY       Family History   Problem Relation Age of Onset   • Heart Disease Mother    • Heart Failure Mother    • Hypertension Mother    • Hyperlipidemia Mother    • Cancer Mother    • Cancer Maternal Aunt 60        breast ca   • Lung Disease Brother         COPD/Emphysema/Smoker   • Cancer Brother         prostate cancer   • Alcohol/Drug Brother         Alcohol   • Kidney Disease Father         renal failure  "    Social History     Socioeconomic History   • Marital status:      Spouse name: Not on file   • Number of children: Not on file   • Years of education: Not on file   • Highest education level: Not on file   Occupational History   • Not on file   Tobacco Use   • Smoking status: Never Smoker   • Smokeless tobacco: Never Used   Substance and Sexual Activity   • Alcohol use: No   • Drug use: No   • Sexual activity: Never     Partners: Male     Birth control/protection: Post-Menopausal   Other Topics Concern   • Primary/coprimary nurse & associates Not Asked   • Family contact information Not Asked   • OK to release patient information to the following Not Asked   • Patient preferred routine/Privacy concerns Not Asked   • Patient likes and dislikes Not Asked   • Participating In Research Study Not Asked   • Miscellaneous Not Asked   Social History Narrative   • Not on file     Social Determinants of Health     Financial Resource Strain: Low Risk    • Difficulty of Paying Living Expenses: Not hard at all   Food Insecurity: No Food Insecurity   • Worried About Running Out of Food in the Last Year: Never true   • Ran Out of Food in the Last Year: Never true   Transportation Needs: No Transportation Needs   • Lack of Transportation (Medical): No   • Lack of Transportation (Non-Medical): No   Physical Activity:    • Days of Exercise per Week:    • Minutes of Exercise per Session:    Stress:    • Feeling of Stress :    Social Connections:    • Frequency of Communication with Friends and Family:    • Frequency of Social Gatherings with Friends and Family:    • Attends Mosque Services:    • Active Member of Clubs or Organizations:    • Attends Club or Organization Meetings:    • Marital Status:    Intimate Partner Violence:    • Fear of Current or Ex-Partner:    • Emotionally Abused:    • Physically Abused:    • Sexually Abused:      Allergies   Allergen Reactions   • Oxaliplatin Anaphylaxis   • Codeine      \"gets " "drunk\"   • Pcn [Penicillins] Itching     itching   • Sulfa Drugs Itching     itching   • Tape Rash     PAPER TAPE OK     Outpatient Encounter Medications as of 3/9/2021   Medication Sig Dispense Refill   • oxyCODONE immediate-release (ROXICODONE) 5 MG Tab Take 1 Tab by mouth every 6 hours for 30 days. 45 Tab 0   • rivaroxaban (XARELTO) 10 MG Tab tablet Take 1 Tab by mouth every day. 30 Tab 5   • Tiotropium Bromide-Olodaterol (STIOLTO RESPIMAT) 2.5-2.5 MCG/ACT Aero Soln Take 2 Puffs by mouth every day. 2 Each 0   • furosemide (LASIX) 40 MG Tab      • potassium chloride ER (KLOR-CON) 10 MEQ tablet Take 1 Tab by mouth every day. 100 Tab 3   • ondansetron (ZOFRAN) 4 MG Tab tablet Take 1 Tab by mouth every four hours as needed. 30 Tab 5   • lidocaine-prilocaine (EMLA) 2.5-2.5 % Cream Apply to port 1 hour prior to access of port and cover with plastic wrap. 30 g 3   • Tiotropium Bromide-Olodaterol (STIOLTO RESPIMAT) 2.5-2.5 MCG/ACT Aero Soln Take 2 Puffs by mouth every day. 1 Inhaler 11   • albuterol 108 (90 Base) MCG/ACT Aero Soln inhalation aerosol Inhale 2 Puffs by mouth every 6 hours as needed for Shortness of Breath. 8.5 g 11   • metronidazole (METROCREAM) 0.75 % cream Apply 1 Each to affected area(s) 2 times a day.     • cyanocobalamin (VITAMIN B-12) 500 MCG Tab Take 1,000 mcg by mouth every day.     • Multiple Vitamins-Minerals (CENTRUM SILVER PO) Take 1 Tab by mouth every day.     • Cholecalciferol (VITAMIN D3) 400 UNITS CAPS Take 1 Cap by mouth every day.     • loratadine (CLARITIN) 10 MG TABS Take 10 mg by mouth every day. Indications: Hayfever     • [] 0.9 % NaCl with KCl 20 mEq infusion      • [] heparin lock flush 100 unit/mL injection 300-500 Units      • [DISCONTINUED] heparin lock flush 100 unit/mL injection 500 Units      • [DISCONTINUED] heparin lock flush 100 unit/mL injection 300-500 Units        No facility-administered encounter medications on file as of 3/9/2021.     Review of Systems " "  Constitutional: Negative.  Negative for chills, fever and malaise/fatigue.   HENT: Negative.  Negative for sore throat.    Eyes: Negative.    Respiratory: Negative.  Negative for cough, hemoptysis, sputum production, shortness of breath, wheezing and stridor.    Cardiovascular: Negative.  Negative for chest pain, palpitations, orthopnea, claudication, leg swelling and PND.   Gastrointestinal: Negative.    Genitourinary: Negative.    Musculoskeletal: Negative.    Skin: Negative.    Neurological: Negative.  Negative for dizziness, loss of consciousness and weakness.   Endo/Heme/Allergies: Negative.  Does not bruise/bleed easily.   All other systems reviewed and are negative.       Objective:   /58 (BP Location: Left arm, Patient Position: Sitting, BP Cuff Size: Adult)   Pulse (!) 110   Resp 16   Ht 1.638 m (5' 4.5\")   Wt 68.5 kg (151 lb)   LMP  (LMP Unknown)   SpO2 96%   BMI 25.52 kg/m²     Physical Exam   Constitutional: She appears well-developed and well-nourished. No distress.   HENT:   Head: Normocephalic and atraumatic.   Right Ear: External ear normal.   Left Ear: External ear normal.   Nose: Nose normal.   Mouth/Throat: No oropharyngeal exudate.   Eyes: Pupils are equal, round, and reactive to light. Conjunctivae and EOM are normal. Right eye exhibits no discharge. Left eye exhibits no discharge. No scleral icterus.   Neck: No JVD present.   Cardiovascular: Normal rate, regular rhythm and intact distal pulses. Exam reveals no gallop and no friction rub.   No murmur heard.  Pulmonary/Chest: Effort normal. No stridor. No respiratory distress. She has no wheezes. She has no rales. She exhibits no tenderness.   Abdominal: Soft. She exhibits no distension. There is no guarding.   Musculoskeletal:         General: No tenderness, deformity or edema. Normal range of motion.      Cervical back: Neck supple.   Neurological: She is alert. She has normal reflexes. She displays normal reflexes. No cranial " nerve deficit. She exhibits normal muscle tone. Coordination normal.   Skin: Skin is warm and dry. No rash noted. She is not diaphoretic. No erythema. No pallor.   Psychiatric: She has a normal mood and affect. Her behavior is normal. Judgment and thought content normal.   Nursing note and vitals reviewed.      Assessment:     1. Paroxysmal supraventricular tachycardia (HCC)  EKG   2. Decreased GFR     3. Dehydration     4. Current use of long term anticoagulation     5. Chemotherapy-induced neutropenia (HCC)     6. Anemia, unspecified type     7. Abnormal EKG     8. Diarrhea, unspecified type     9. High risk medication use     10. History of pulmonary embolus (PE)     11. Hyperglycemia     12. Hyperlipidemia, unspecified hyperlipidemia type     13. Essential hypertension     14. Localized edema     15. Renal insufficiency     16. Shortness of breath         Medical Decision Making:  Today's Assessment / Status / Plan:     79-year-old female with PEs anemia edema shortness of breath and high risk medication usage.  From a cardiac standpoint I am having a hard time for out what exactly is driving her shortness of breath and tachycardia.  I am not sure maybe able to figure any this out as an outpatient.  Were back For 6 weeks for an echocardiogram.  I am sending her to the ER for further evaluation.  She will need an echocardiogram at the very minimum as well as lab work.

## 2021-03-10 NOTE — ASSESSMENT & PLAN NOTE
Unclear cause, on 5-FU.  CT chest no pneumonia; a 1.7 x 2.6 cm nodular opacity in the right lung base of crossing from the liver lesion.  Known history of metastatic liver cancer    Follow-up telemetry monitoring, echocardiogram, ambulating pulse oximetry   And PFTs

## 2021-03-10 NOTE — PROGRESS NOTES
Handoff completed. Patient plan reviewed. Patient has no complaints of SOB or chest pain at this time.

## 2021-03-10 NOTE — ED NOTES
Pt has power port in left upper chest and states has an iliostomy as well. Pt states she brought her own supplies states the hospital never has the equipment she uses. Pt is alert and oriented x4 GCS=15. Pt denies chest pain @ this time. Called central supply for power port kit to access pts power port. Will cont to monitor pt

## 2021-03-10 NOTE — RESPIRATORY CARE
COPD EDUCATION by COPD CLINICAL EDUCATOR  3/10/2021 at 8:21 AM by Mario Alberto Hernandez RRT     Patient reviewed by COPD education team. Patient does not have a history or diagnosis of COPD, with normal PFT on 12/2020.  Patient is a non-smoker. Therefore does not qualify for the COPD program.

## 2021-03-10 NOTE — PROGRESS NOTES
Patient reported that she has been here due to shortness of breath.  She is due for chemo for colon cancer, her oncologist wants her to get work-up done before next chemo.    CT chest showed 1. No consolidation to suggest pneumonia.     2. There is a 1.7 x 2.6 cm nodular opacity in the right lung base of crossing from the liver lesion. This opacity has changed in size with the ablation, previously measured 1.6 x 2.5 cm in 5/7/ 2020 and 0.8 x 1.0 cm in 10/5/2020. This is suspected to be   burned area adjacent to ablation. Continued follow-up is recommended.     3. Partially visualized ablation cavity in the hepatic dome measuring 4.4 x 6.3 cm, previously 4.3 x 6.8 cm    Na 130, , continue to monitor      She is on room air.  Denies any chest pain, nausea vomiting, abdominal pain.  Pending echo and pulmonary function testing today.

## 2021-03-10 NOTE — ED TRIAGE NOTES
Pt here via [facility] PCP called et told pt wanted her seen in ED for further testing regardng her increased SOA and cough. Pt here via WC A&O x4 GCS= 15

## 2021-03-11 ENCOUNTER — APPOINTMENT (OUTPATIENT)
Dept: CARDIOLOGY | Facility: MEDICAL CENTER | Age: 81
End: 2021-03-11
Attending: INTERNAL MEDICINE
Payer: MEDICARE

## 2021-03-11 ENCOUNTER — APPOINTMENT (OUTPATIENT)
Dept: ONCOLOGY | Facility: MEDICAL CENTER | Age: 81
End: 2021-03-11
Attending: INTERNAL MEDICINE
Payer: MEDICARE

## 2021-03-11 VITALS
OXYGEN SATURATION: 96 % | BODY MASS INDEX: 25.37 KG/M2 | WEIGHT: 157.85 LBS | HEART RATE: 88 BPM | TEMPERATURE: 97.5 F | DIASTOLIC BLOOD PRESSURE: 78 MMHG | HEIGHT: 66 IN | RESPIRATION RATE: 16 BRPM | SYSTOLIC BLOOD PRESSURE: 119 MMHG

## 2021-03-11 LAB
LV EJECT FRACT  99904: 65
LV EJECT FRACT MOD 2C 99903: 75.1
LV EJECT FRACT MOD 4C 99902: 60.1
LV EJECT FRACT MOD BP 99901: 66.69

## 2021-03-11 PROCEDURE — 700111 HCHG RX REV CODE 636 W/ 250 OVERRIDE (IP): Performed by: STUDENT IN AN ORGANIZED HEALTH CARE EDUCATION/TRAINING PROGRAM

## 2021-03-11 PROCEDURE — A9270 NON-COVERED ITEM OR SERVICE: HCPCS | Performed by: STUDENT IN AN ORGANIZED HEALTH CARE EDUCATION/TRAINING PROGRAM

## 2021-03-11 PROCEDURE — 93306 TTE W/DOPPLER COMPLETE: CPT

## 2021-03-11 PROCEDURE — G0378 HOSPITAL OBSERVATION PER HR: HCPCS

## 2021-03-11 PROCEDURE — 93306 TTE W/DOPPLER COMPLETE: CPT | Mod: 26 | Performed by: INTERNAL MEDICINE

## 2021-03-11 PROCEDURE — A9270 NON-COVERED ITEM OR SERVICE: HCPCS | Performed by: INTERNAL MEDICINE

## 2021-03-11 PROCEDURE — 700102 HCHG RX REV CODE 250 W/ 637 OVERRIDE(OP): Performed by: INTERNAL MEDICINE

## 2021-03-11 PROCEDURE — 700102 HCHG RX REV CODE 250 W/ 637 OVERRIDE(OP): Performed by: STUDENT IN AN ORGANIZED HEALTH CARE EDUCATION/TRAINING PROGRAM

## 2021-03-11 PROCEDURE — 99217 PR OBSERVATION CARE DISCHARGE: CPT | Performed by: STUDENT IN AN ORGANIZED HEALTH CARE EDUCATION/TRAINING PROGRAM

## 2021-03-11 RX ORDER — HEPARIN SODIUM (PORCINE) LOCK FLUSH IV SOLN 100 UNIT/ML 100 UNIT/ML
20 SOLUTION INTRAVENOUS PRN
Status: DISCONTINUED | OUTPATIENT
Start: 2021-03-11 | End: 2021-03-11 | Stop reason: HOSPADM

## 2021-03-11 RX ADMIN — POTASSIUM CHLORIDE 10 MEQ: 1500 TABLET, EXTENDED RELEASE ORAL at 05:30

## 2021-03-11 RX ADMIN — HEPARIN 20 UNITS: 100 SYRINGE at 14:31

## 2021-03-11 RX ADMIN — ASPIRIN 325 MG ORAL TABLET 325 MG: 325 PILL ORAL at 05:30

## 2021-03-11 RX ADMIN — LORATADINE 10 MG: 10 TABLET ORAL at 05:30

## 2021-03-11 ASSESSMENT — PAIN DESCRIPTION - PAIN TYPE
TYPE: ACUTE PAIN
TYPE: ACUTE PAIN

## 2021-03-11 ASSESSMENT — FIBROSIS 4 INDEX: FIB4 SCORE: 3.77

## 2021-03-11 NOTE — THERAPY
Physical Therapy   Initial Evaluation     Patient Name: Karlene Walters  Age:  80 y.o., Sex:  female  Medical Record #: [id number]  Today's Date: 3/10/2021          Assessment  Patient is an 80 y.o. female admitted with SOB. Pt seen for PT evaluation att his time. Pt reports SOB has been ongoing for a while. Discussed energy conservation techniques including use of DME, breathing techniques, taking appropriate rest breaks, asking for assist for IADLs. Discussed safe mobility with SOB. Pt demonstrated all mobility without assist, ambulated with and without FWW without LOB though demos improved steadiness with walker. Negotiated steps with mild incr SOB. Pt appears functionally capable of return home. Encouraged continued mobilization with nursing. Patient will not be actively followed for physical therapy services at this time, however may be seen if requested by physician for 1 more visit within 30 days to address any discharge or equipment needs.     Plan  Recommend Physical Therapy for Evaluation only   DC Equipment Recommendations: Front-Wheel Walker, Tub / Shower Seat  Discharge Recommendations: Anticipate that the patient will have no further physical therapy needs after discharge from the hospital        03/10/21 1643   Prior Living Situation   Prior Services None   Housing / Facility Mobile Home   Steps Into Home 4   Steps In Home 0   Equipment Owned None   Lives with -  Spouse   Comments reports lives with  but they are essentially seperated and live on opposite ends of the home. spouse has dementia and cannot assist. pt reports multiple friends and family living very close by to assist. pt indep in ADLs and IADLs PTA   Prior Level of Functional Mobility   Bed Mobility Independent   Transfer Status Independent   Ambulation Independent   Distance Ambulation (Feet) community distances   Assistive Devices Used None   Stairs Independent   Comments uses electric scooter at grocery store   Balance  Assessment   Sitting Balance (Static) Good   Sitting Balance (Dynamic) Good   Standing Balance (Static) Fair +   Standing Balance (Dynamic) Fair   Weight Shift Sitting Good   Weight Shift Standing Good   Comments with and without FWW   Gait Analysis   Gait Level Of Assist Supervised   Assistive Device Front Wheel Walker   Distance (Feet) 250   # of Stairs Climbed 3   Level of Assist with Stairs Supervised   Weight Bearing Status no restrictions   Comments no LOB. brief standing rest break.    Bed Mobility    Supine to Sit Supervised   Sit to Supine Supervised   Scooting Supervised   Functional Mobility   Sit to Stand Supervised   Toilet Transfers Supervised

## 2021-03-11 NOTE — WOUND TEAM
"Received consult for POA ileostomy. Pt independent with care. Brought appliances from [facility] and declined extra appliances at this time. Pt wears 2.75\" 2-piece barrier and convex pouch with convex 40 mm ring. Contact wound team if extra supplies are needed.   "

## 2021-03-11 NOTE — DISCHARGE PLANNING
ANTICIPATED DISPOSITION PLAN: Home       ACTION: Pt requested cab voucher from bedside nurse. LSW gave RN voucher for pt to [facility].       BARRIERS: None      PLAN: LSW to assist when needed

## 2021-03-12 NOTE — DISCHARGE INSTRUCTIONS
Discharge Instructions    Discharged to home by car with relative. Discharged via wheelchair, hospital escort: Yes.  Special equipment needed: Not Applicable    Be sure to schedule a follow-up appointment with your primary care doctor or any specialists as instructed.     Discharge Plan:   Influenza Vaccine Indication: Not indicated: Previously immunized this influenza season and > 8 years of age    I understand that a diet low in cholesterol, fat, and sodium is recommended for good health. Unless I have been given specific instructions below for another diet, I accept this instruction as my diet prescription.   Other diet: low fiber    Special Instructions: f/u for chemo    · Is patient discharged on Warfarin / Coumadin?   No     Depression / Suicide Risk    As you are discharged from this [facility] facility, it is important to learn how to keep safe from harming yourself.    Recognize the warning signs:  · Abrupt changes in personality, positive or negative- including increase in energy   · Giving away possessions  · Change in eating patterns- significant weight changes-  positive or negative  · Change in sleeping patterns- unable to sleep or sleeping all the time   · Unwillingness or inability to communicate  · Depression  · Unusual sadness, discouragement and loneliness  · Talk of wanting to die  · Neglect of personal appearance   · Rebelliousness- reckless behavior  · Withdrawal from people/activities they love  · Confusion- inability to concentrate     If you or a loved one observes any of these behaviors or has concerns about self-harm, here's what you can do:  · Talk about it- your feelings and reasons for harming yourself  · Remove any means that you might use to hurt yourself (examples: pills, rope, extension cords, firearm)  · Get professional help from the community (Mental Health, Substance Abuse, psychological counseling)  · Do not be alone:Call your Safe Contact- someone whom you trust who will be  there for you.  · Call your local CRISIS HOTLINE 338-4017 or 223-676-9988  · Call your local Children's Mobile Crisis Response Team [place] (519) 046-5291 or www.GoSpotCheck  · Call the toll free National Suicide Prevention Hotlines   · National Suicide Prevention Lifeline 173-475-JBES (5619)  · National Hope Line Network 800-SUICIDE (063-3563)    Please follow-up with your oncologist as scheduled  Follow-up with the PCP in 2 weeks

## 2021-03-12 NOTE — PROGRESS NOTES
Port de accessed by MIKE Kimble  Instructions and rx info given to pt.  Has colostomy supplies.  Will f/u with oncologist. Able to wait for ride. [email or web address].

## 2021-03-12 NOTE — DISCHARGE SUMMARY
Discharge Summary    CHIEF COMPLAINT ON ADMISSION  Chief Complaint   Patient presents with   • Shortness of Breath       Reason for Admission  EMS     Admission Date  3/9/2021    CODE STATUS  Full Code    HPI & HOSPITAL COURSE  Karlene Walters is a 80 y.o. female who presents with shortness of breath.  The patient has a known history of metastatic colon cancer.  She states recently found that has most likely metastasized to the lung and the liver.  She is currently undergoing chemotherapy.  Over the last several days she is had progressive increased work of breathing.  She states when she exerts herself she can barely breathe and when she coughs she has extreme increased work of breathing.  Patient reported that she has been here due to shortness of breath.  She is due for chemo for colon cancer, her oncologist wants her to get work-up done before next chemo. CT chest showed 1. No consolidation to suggest pneumonia; 2. There is a 1.7 x 2.6 cm nodular opacity in the right lung base of crossing from the liver lesion. This opacity has changed in size with the ablation, previously measured 1.6 x 2.5 cm in 5/7/ 2020 and 0.8 x 1.0 cm in 10/5/2020. This is suspected to be   burned area adjacent to ablation. Continued follow-up is recommended.   3. Partially visualized ablation cavity in the hepatic dome measuring 4.4 x 6.3 cm, previously 4.3 x 6.8 cm. ECHO normal. PFT in the normal range. Discussed with her oncology, her sob likely due to chemo and end stage cancer with lung involvement. Pt is Okay to go home and to continue to follow-up with her oncologist.      Therefore, she is discharged in good and stable condition to home with close outpatient follow-up.    The patient met 2-midnight criteria for an inpatient stay at the time of discharge.    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 *      FOLLOW UP  Future Appointments   Date Time Provider Department Center   3/23/2021  7[facility]   3/23/2021  7:30 AM ARTURO Krishnan None   3/23/2021  8:00 [facility]   3/25/2021 11:30 [facility]   4/5/2021 11:00 [facility]   4/5/2021  1:00 PM [address] 1 DEJA [facility]   4/5/2021  3:00 PM MARIANA Barry None   4/6/2021  7:00 [facility]   4/6/2021  8:00 AM [facility]   4/6/2021 11:30 AM Kit West M.D. [facility] None   4/8/2021 11:30 [facility]   4/20/2021  7:00 AM [facility]   4/20/2021  7:30 AM ARTURO Krishnan None   4/20/2021  8:00 [facility]   4/22/2021 11:45 AM [facility]   5/4/2021  7:00 AM [facility]   5/4/2021  7:30 AM ARTURO Krishnan None   5/4/2021  8:00 [facility]   5/6/2021 11:30 [facility]   5/17/2021  1:00 PM [address]   5/18/2021  7:00 AM [facility]   5/18/2021  7:30 AM ARTURO Krishnan [facility] None   5/18/2021  8:00 [facility]   5/19/2021 12:40 PM BROWN [facility] None   5/20/2021 11:45 [facility]   6/16/2021  2:00 PM Manan Quiñonez M.D. [address] GORAN Quiñonez M.D.  [address]  [facility] [address]  [facility]  [id number]  [phone]    In 2 weeks      Oncology    In 1 week  As scheduled      MEDICATIONS ON DISCHARGE     Medication List      CONTINUE taking these medications      Instructions   albuterol 108 (90 Base) MCG/ACT Aers inhalation aerosol   Inhale 2 Puffs by mouth every 6 hours as needed for Shortness of Breath.  Dose: 2 Puff     CENTRUM SILVER PO   Take 1 Tab by mouth every day.  Dose: 1 tablet     cyanocobalamin 500 MCG Tabs  Commonly known as: VITAMIN B-12   Take 1,000 mcg by mouth every day.  Dose: 1,000 mcg     furosemide 40 MG Tabs  Commonly known as: LASIX      lidocaine-prilocaine 2.5-2.5 % Crea  Commonly known as: EMLA   Apply to port 1 hour prior to access of port and cover with plastic wrap.     loratadine 10 MG Tabs  Commonly known as: CLARITIN   Take 10 mg by mouth every day. Indications: Hayfever  Dose: 10 mg     metronidazole 0.75 % cream  Commonly known as: METROCREAM   Apply 1 Each to affected area(s) 2 times a day.  Dose: 1 Each     ondansetron 4 MG Tabs tablet  Commonly known as: ZOFRAN   Take 1 Tab by mouth every four hours as needed.  Dose: 4 mg     oxyCODONE immediate-release 5 MG Tabs  Commonly known as: ROXICODONE   Take 1 Tab by mouth every 6 hours for 30 days.  Dose: 5 mg     potassium chloride ER 10 MEQ tablet  Commonly known as: KLOR-CON   Take 1 Tab by mouth every day.  Dose: 10 mEq     rivaroxaban 10 MG Tabs tablet  Commonly known as: Xarelto   Take 1 Tab by mouth every day.  Dose: 10 mg     * Stiolto Respimat 2.5-2.5 MCG/ACT Aers  Generic drug: Tiotropium Bromide-Olodaterol   Take 2 Puffs by mouth every day.  Dose: 2 Puff     * Stiolto Respimat 2.5-2.5 MCG/ACT Aers  Generic drug: Tiotropium Bromide-Olodaterol   Doctor's comments: sample  Take 2 Puffs by mouth every day.  Dose: 2 Puff     Vitamin D3 10 MCG (400 UNIT) Caps   Take 1 Cap by mouth every day.  Dose: 1 capsule         * This list has 2 medication(s) that are the same as other medications prescribed for you. Read the  "directions carefully, and ask your doctor or other care provider to review them with you.                Allergies  Allergies   Allergen Reactions   • Oxaliplatin Anaphylaxis   • Codeine      \"gets drunk\"   • Pcn [Penicillins] Itching     itching   • Sulfa Drugs Itching     itching   • Tape Rash     PAPER TAPE OK       DIET  Orders Placed This Encounter   Procedures   • Diet Order Diet: Cardiac     Standing Status:   Standing     Number of Occurrences:   1     Order Specific Question:   Diet:     Answer:   Cardiac [6]       ACTIVITY  As tolerated.  Weight bearing as tolerated    CONSULTATIONS  Oncology    PROCEDURES      LABORATORY  Lab Results   Component Value Date    SODIUM 130 (L) 03/09/2021    POTASSIUM 4.2 03/09/2021    CHLORIDE 97 03/09/2021    CO2 21 03/09/2021    GLUCOSE 91 03/09/2021    BUN 23 (H) 03/09/2021    CREATININE 0.99 03/09/2021        Lab Results   Component Value Date    WBC 4.2 (L) 03/09/2021    HEMOGLOBIN 12.2 03/09/2021    HEMATOCRIT 38.9 03/09/2021    PLATELETCT 186 03/09/2021        Total time of the discharge process exceeds 32 minutes.  "

## 2021-03-15 ENCOUNTER — TELEPHONE (OUTPATIENT)
Dept: HEMATOLOGY ONCOLOGY | Facility: MEDICAL CENTER | Age: 81
End: 2021-03-15

## 2021-03-15 NOTE — TELEPHONE ENCOUNTER
Called to check in on patient today and see how she was feeling.  She stated she feels like she is back to her normal baseline, with still some mild shortness of breath as well as fatigue.  However she does not feel as short of breath as she did last week.    Patient was grateful for the call.  Confirmed follow-up visit next week in office.

## 2021-03-18 DIAGNOSIS — R06.02 SHORTNESS OF BREATH: ICD-10-CM

## 2021-03-18 DIAGNOSIS — R06.00 DYSPNEA, UNSPECIFIED TYPE: ICD-10-CM

## 2021-03-18 RX ORDER — ALBUTEROL SULFATE 90 UG/1
2 AEROSOL, METERED RESPIRATORY (INHALATION) EVERY 6 HOURS PRN
Qty: 1 EACH | Refills: 11 | Status: SHIPPED | OUTPATIENT
Start: 2021-03-18 | End: 2021-05-19 | Stop reason: SDUPTHER

## 2021-03-18 NOTE — TELEPHONE ENCOUNTER
Have we ever prescribed this med? Yes.  If yes, what date? 12/2/20    Last OV: 122/20    Next OV: no pending    DX:Shortness of breath ,dyspnea    Medications:Ventolin/albuterol inhaler

## 2021-03-22 RX ORDER — HEPARIN SODIUM (PORCINE) LOCK FLUSH IV SOLN 100 UNIT/ML 100 UNIT/ML
500 SOLUTION INTRAVENOUS PRN
Status: CANCELLED | OUTPATIENT
Start: 2021-04-22

## 2021-03-22 RX ORDER — HEPARIN SODIUM (PORCINE) LOCK FLUSH IV SOLN 100 UNIT/ML 100 UNIT/ML
500 SOLUTION INTRAVENOUS PRN
Status: CANCELLED | OUTPATIENT
Start: 2021-04-20

## 2021-03-22 RX ORDER — PROCHLORPERAZINE MALEATE 10 MG
10 TABLET ORAL EVERY 6 HOURS PRN
Status: CANCELLED | OUTPATIENT
Start: 2021-04-20

## 2021-03-22 RX ORDER — ONDANSETRON 8 MG/1
8 TABLET, ORALLY DISINTEGRATING ORAL
Status: CANCELLED | OUTPATIENT
Start: 2021-04-20

## 2021-03-22 RX ORDER — ONDANSETRON 2 MG/ML
4 INJECTION INTRAMUSCULAR; INTRAVENOUS
Status: CANCELLED | OUTPATIENT
Start: 2021-04-06

## 2021-03-22 RX ORDER — LIDOCAINE HYDROCHLORIDE 10 MG/ML
10 INJECTION, SOLUTION INFILTRATION; PERINEURAL ONCE
Status: CANCELLED | OUTPATIENT
Start: 2021-04-20 | End: 2021-04-20

## 2021-03-22 RX ORDER — ONDANSETRON 2 MG/ML
4 INJECTION INTRAMUSCULAR; INTRAVENOUS
Status: CANCELLED | OUTPATIENT
Start: 2021-04-20

## 2021-03-22 RX ORDER — LIDOCAINE HYDROCHLORIDE 10 MG/ML
10 INJECTION, SOLUTION INFILTRATION; PERINEURAL ONCE
Status: CANCELLED | OUTPATIENT
Start: 2021-04-06 | End: 2021-04-06

## 2021-03-22 RX ORDER — PROCHLORPERAZINE MALEATE 10 MG
10 TABLET ORAL EVERY 6 HOURS PRN
Status: CANCELLED | OUTPATIENT
Start: 2021-04-06

## 2021-03-22 RX ORDER — HEPARIN SODIUM (PORCINE) LOCK FLUSH IV SOLN 100 UNIT/ML 100 UNIT/ML
500 SOLUTION INTRAVENOUS PRN
Status: CANCELLED | OUTPATIENT
Start: 2021-04-06

## 2021-03-22 RX ORDER — ONDANSETRON 8 MG/1
8 TABLET, ORALLY DISINTEGRATING ORAL
Status: CANCELLED | OUTPATIENT
Start: 2021-04-06

## 2021-03-22 RX ORDER — HEPARIN SODIUM (PORCINE) LOCK FLUSH IV SOLN 100 UNIT/ML 100 UNIT/ML
500 SOLUTION INTRAVENOUS PRN
Status: CANCELLED | OUTPATIENT
Start: 2021-04-08

## 2021-03-22 RX ORDER — DEXAMETHASONE SODIUM PHOSPHATE 4 MG/ML
8 INJECTION, SOLUTION INTRA-ARTICULAR; INTRALESIONAL; INTRAMUSCULAR; INTRAVENOUS; SOFT TISSUE ONCE
Status: CANCELLED | OUTPATIENT
Start: 2021-04-20 | End: 2021-04-20

## 2021-03-22 RX ORDER — DEXTROSE MONOHYDRATE 50 MG/ML
INJECTION, SOLUTION INTRAVENOUS CONTINUOUS
Status: CANCELLED | OUTPATIENT
Start: 2021-04-20

## 2021-03-22 RX ORDER — DEXAMETHASONE SODIUM PHOSPHATE 4 MG/ML
8 INJECTION, SOLUTION INTRA-ARTICULAR; INTRALESIONAL; INTRAMUSCULAR; INTRAVENOUS; SOFT TISSUE ONCE
Status: CANCELLED | OUTPATIENT
Start: 2021-04-06 | End: 2021-04-06

## 2021-03-22 RX ORDER — DEXTROSE MONOHYDRATE 50 MG/ML
INJECTION, SOLUTION INTRAVENOUS CONTINUOUS
Status: CANCELLED | OUTPATIENT
Start: 2021-04-06

## 2021-03-23 ENCOUNTER — OUTPATIENT INFUSION SERVICES (OUTPATIENT)
Dept: ONCOLOGY | Facility: MEDICAL CENTER | Age: 81
End: 2021-03-23
Attending: INTERNAL MEDICINE
Payer: MEDICARE

## 2021-03-23 ENCOUNTER — OFFICE VISIT (OUTPATIENT)
Dept: HEMATOLOGY ONCOLOGY | Facility: MEDICAL CENTER | Age: 81
End: 2021-03-23
Payer: MEDICARE

## 2021-03-23 VITALS
DIASTOLIC BLOOD PRESSURE: 75 MMHG | OXYGEN SATURATION: 97 % | HEIGHT: 64 IN | SYSTOLIC BLOOD PRESSURE: 146 MMHG | BODY MASS INDEX: 26.69 KG/M2 | RESPIRATION RATE: 18 BRPM | TEMPERATURE: 97.2 F | WEIGHT: 156.31 LBS | HEART RATE: 77 BPM

## 2021-03-23 VITALS
TEMPERATURE: 98.2 F | BODY MASS INDEX: 26.02 KG/M2 | HEART RATE: 97 BPM | SYSTOLIC BLOOD PRESSURE: 120 MMHG | WEIGHT: 156.2 LBS | OXYGEN SATURATION: 97 % | DIASTOLIC BLOOD PRESSURE: 70 MMHG | HEIGHT: 65 IN | RESPIRATION RATE: 20 BRPM

## 2021-03-23 VITALS
WEIGHT: 156.31 LBS | DIASTOLIC BLOOD PRESSURE: 75 MMHG | SYSTOLIC BLOOD PRESSURE: 146 MMHG | BODY MASS INDEX: 26.69 KG/M2 | HEART RATE: 77 BPM | HEIGHT: 64 IN | TEMPERATURE: 97.2 F | OXYGEN SATURATION: 97 % | RESPIRATION RATE: 18 BRPM

## 2021-03-23 DIAGNOSIS — C78.7 SECONDARY MALIGNANT NEOPLASM OF LIVER (HCC): ICD-10-CM

## 2021-03-23 DIAGNOSIS — C20 MALIGNANT NEOPLASM OF RECTUM (HCC): ICD-10-CM

## 2021-03-23 DIAGNOSIS — R06.02 SOB (SHORTNESS OF BREATH): ICD-10-CM

## 2021-03-23 DIAGNOSIS — Z86.711 HISTORY OF PULMONARY EMBOLUS (PE): ICD-10-CM

## 2021-03-23 DIAGNOSIS — Z79.01 CURRENT USE OF LONG TERM ANTICOAGULATION: ICD-10-CM

## 2021-03-23 DIAGNOSIS — G89.3 CANCER RELATED PAIN: ICD-10-CM

## 2021-03-23 DIAGNOSIS — R74.8 ELEVATED ALKALINE PHOSPHATASE LEVEL: ICD-10-CM

## 2021-03-23 DIAGNOSIS — R94.4 DECREASED GFR: ICD-10-CM

## 2021-03-23 LAB
ALBUMIN SERPL BCP-MCNC: 3.5 G/DL (ref 3.2–4.9)
ALBUMIN/GLOB SERPL: 0.9 G/DL
ALP SERPL-CCNC: 333 U/L (ref 30–99)
ALT SERPL-CCNC: 28 U/L (ref 2–50)
ANION GAP SERPL CALC-SCNC: 12 MMOL/L (ref 7–16)
AST SERPL-CCNC: 43 U/L (ref 12–45)
BASOPHILS # BLD AUTO: 1.5 % (ref 0–1.8)
BASOPHILS # BLD: 0.12 K/UL (ref 0–0.12)
BILIRUB SERPL-MCNC: 0.5 MG/DL (ref 0.1–1.5)
BUN SERPL-MCNC: 17 MG/DL (ref 8–22)
CALCIUM SERPL-MCNC: 9.3 MG/DL (ref 8.5–10.5)
CEA SERPL-MCNC: 1.4 NG/ML (ref 0–3)
CHLORIDE SERPL-SCNC: 99 MMOL/L (ref 96–112)
CO2 SERPL-SCNC: 22 MMOL/L (ref 20–33)
CREAT SERPL-MCNC: 1.07 MG/DL (ref 0.5–1.4)
EOSINOPHIL # BLD AUTO: 0.21 K/UL (ref 0–0.51)
EOSINOPHIL NFR BLD: 2.6 % (ref 0–6.9)
ERYTHROCYTE [DISTWIDTH] IN BLOOD BY AUTOMATED COUNT: 55.5 FL (ref 35.9–50)
GLOBULIN SER CALC-MCNC: 3.8 G/DL (ref 1.9–3.5)
GLUCOSE SERPL-MCNC: 101 MG/DL (ref 65–99)
HCT VFR BLD AUTO: 38.8 % (ref 37–47)
HGB BLD-MCNC: 11.7 G/DL (ref 12–16)
IMM GRANULOCYTES # BLD AUTO: 0.08 K/UL (ref 0–0.11)
IMM GRANULOCYTES NFR BLD AUTO: 1 % (ref 0–0.9)
LYMPHOCYTES # BLD AUTO: 1.69 K/UL (ref 1–4.8)
LYMPHOCYTES NFR BLD: 21 % (ref 22–41)
MCH RBC QN AUTO: 25.8 PG (ref 27–33)
MCHC RBC AUTO-ENTMCNC: 30.2 G/DL (ref 33.6–35)
MCV RBC AUTO: 85.5 FL (ref 81.4–97.8)
MONOCYTES # BLD AUTO: 0.96 K/UL (ref 0–0.85)
MONOCYTES NFR BLD AUTO: 11.9 % (ref 0–13.4)
NEUTROPHILS # BLD AUTO: 5 K/UL (ref 2–7.15)
NEUTROPHILS NFR BLD: 62 % (ref 44–72)
NRBC # BLD AUTO: 0 K/UL
NRBC BLD-RTO: 0 /100 WBC
PLATELET # BLD AUTO: 363 K/UL (ref 164–446)
PMV BLD AUTO: 10 FL (ref 9–12.9)
POTASSIUM SERPL-SCNC: 3.8 MMOL/L (ref 3.6–5.5)
PROT SERPL-MCNC: 7.3 G/DL (ref 6–8.2)
RBC # BLD AUTO: 4.54 M/UL (ref 4.2–5.4)
SODIUM SERPL-SCNC: 133 MMOL/L (ref 135–145)
WBC # BLD AUTO: 8.1 K/UL (ref 4.8–10.8)

## 2021-03-23 PROCEDURE — A4212 NON CORING NEEDLE OR STYLET: HCPCS

## 2021-03-23 PROCEDURE — 85025 COMPLETE CBC W/AUTO DIFF WBC: CPT

## 2021-03-23 PROCEDURE — G0498 CHEMO EXTEND IV INFUS W/PUMP: HCPCS

## 2021-03-23 PROCEDURE — 82378 CARCINOEMBRYONIC ANTIGEN: CPT

## 2021-03-23 PROCEDURE — 96361 HYDRATE IV INFUSION ADD-ON: CPT

## 2021-03-23 PROCEDURE — 99214 OFFICE O/P EST MOD 30 MIN: CPT | Performed by: NURSE PRACTITIONER

## 2021-03-23 PROCEDURE — 96374 THER/PROPH/DIAG INJ IV PUSH: CPT | Mod: XU

## 2021-03-23 PROCEDURE — 96375 TX/PRO/DX INJ NEW DRUG ADDON: CPT

## 2021-03-23 PROCEDURE — 80053 COMPREHEN METABOLIC PANEL: CPT

## 2021-03-23 PROCEDURE — 700105 HCHG RX REV CODE 258: Performed by: NURSE PRACTITIONER

## 2021-03-23 PROCEDURE — 700111 HCHG RX REV CODE 636 W/ 250 OVERRIDE (IP): Performed by: INTERNAL MEDICINE

## 2021-03-23 RX ORDER — SODIUM CHLORIDE 9 MG/ML
500 INJECTION, SOLUTION INTRAVENOUS ONCE
Status: CANCELLED
Start: 2021-04-06 | End: 2021-04-06

## 2021-03-23 RX ORDER — SODIUM CHLORIDE 9 MG/ML
500 INJECTION, SOLUTION INTRAVENOUS ONCE
Status: CANCELLED
Start: 2021-03-25 | End: 2021-03-25

## 2021-03-23 RX ORDER — SODIUM CHLORIDE 9 MG/ML
500 INJECTION, SOLUTION INTRAVENOUS ONCE
Status: CANCELLED
Start: 2021-04-22 | End: 2021-04-22

## 2021-03-23 RX ORDER — SODIUM CHLORIDE 9 MG/ML
500 INJECTION, SOLUTION INTRAVENOUS ONCE
Status: CANCELLED
Start: 2021-04-08 | End: 2021-04-08

## 2021-03-23 RX ORDER — SODIUM CHLORIDE 9 MG/ML
500 INJECTION, SOLUTION INTRAVENOUS ONCE
Status: COMPLETED | OUTPATIENT
Start: 2021-03-23 | End: 2021-03-23

## 2021-03-23 RX ORDER — SODIUM CHLORIDE 9 MG/ML
500 INJECTION, SOLUTION INTRAVENOUS ONCE
Status: CANCELLED
Start: 2021-04-20 | End: 2021-04-20

## 2021-03-23 RX ORDER — DEXAMETHASONE SODIUM PHOSPHATE 4 MG/ML
8 INJECTION, SOLUTION INTRA-ARTICULAR; INTRALESIONAL; INTRAMUSCULAR; INTRAVENOUS; SOFT TISSUE ONCE
Status: COMPLETED | OUTPATIENT
Start: 2021-03-23 | End: 2021-03-23

## 2021-03-23 RX ORDER — SODIUM CHLORIDE 9 MG/ML
500 INJECTION, SOLUTION INTRAVENOUS ONCE
Status: CANCELLED
Start: 2021-03-23 | End: 2021-03-23

## 2021-03-23 RX ADMIN — SODIUM CHLORIDE 500 ML: 9 INJECTION, SOLUTION INTRAVENOUS at 08:25

## 2021-03-23 RX ADMIN — FLUOROURACIL 3435 MG: 50 INJECTION, SOLUTION INTRAVENOUS at 09:37

## 2021-03-23 RX ADMIN — DEXAMETHASONE SODIUM PHOSPHATE 8 MG: 4 INJECTION, SOLUTION INTRA-ARTICULAR; INTRALESIONAL; INTRAMUSCULAR; INTRAVENOUS; SOFT TISSUE at 08:45

## 2021-03-23 ASSESSMENT — FIBROSIS 4 INDEX
FIB4 SCORE: 3.77
FIB4 SCORE: 1.79
FIB4 SCORE: 3.77

## 2021-03-23 ASSESSMENT — ENCOUNTER SYMPTOMS
NAUSEA: 1
FEVER: 0
DIARRHEA: 0
DIZZINESS: 1
VOMITING: 0
WEIGHT LOSS: 0
SHORTNESS OF BREATH: 1
MYALGIAS: 0
PALPITATIONS: 1
HEADACHES: 0
CONSTIPATION: 0
CHILLS: 0

## 2021-03-23 ASSESSMENT — PAIN SCALES - GENERAL: PAINLEVEL: NO PAIN

## 2021-03-23 NOTE — PROGRESS NOTES
"Pharmacy Chemotherapy Verification  Patient Name: Karlene Walters      Dx: Colon Ca      Protocol: 5-FU + Leucovorin     Leucovorin 400 mg/m² IV over 2 hours on Day 1, followed by  Fluorouracil 400 mg/m²  IVP on Day 1, followed by                --7/5/16: Dose reduced by 20% to 320 mg/m² starting with Cycle 18 ([place] #11)  due to neutropenia                -- 10/31/17: delete Leucovorin and 5-FU push d/t neutropenia per Dr. Hardy   Fluorouracil 2400 mg/m² continuous infusion over 46-48 hours                --7/5/16: Infusion Dose reduced by 20% starting with Cycle 18 ([place] #11)  due to neutropenia               --20% reduction (1920 mg/m²) to be continued starting C28 per C Alsop APRN    14 day cycles for 12 cycles (adjuvant) or until disease progression or unacceptable toxicity  *Dosing Reference*     NCCN Guidelines for Colon Cancer. V.2.2015.  Jay T, et al. Eur J Cancer.1999;35(9):1343-7.     Allergies:  Oxaliplatin; Codeine; Pcn [penicillins]; Sulfa drugs; and Tape     /75   Pulse 77   Temp 36.2 °C (97.2 °F) (Temporal)   Resp 18   Ht 1.62 m (5' 3.78\")   Wt 70.9 kg (156 lb 4.9 oz)   LMP  (LMP Unknown)   SpO2 97%   BMI 27.02 kg/m²  Body surface area is 1.79 meters squared.     Labs 3/23/21:  ANC~ 5000 Plt = 363k   Hgb = 11.7     SCr = 1.07 mg/dL CrCl ~ 47 mL/min   AST/ALT/ALK = 43/28/333 TBili = 0.5      Drug Order   (Drug name, dose, route, IV Fluid & volume, frequency, number of doses) Cycle 126 ([place] cycle 118)    Previous treatment: 2/23/21     Medication = Fluorouracil (5-FU)  Base Dose = 1920 mg/m²  Calc Dose: Base Dose x 1.79 m² = 3436.8 mg  Final Dose = 3435 mg   Route = CIVI via CADD pump  Fluid & Volume = 68.7 mL (+ 3 mL overfill = 71.7 mL)  Admin Duration = Over 46 hours @ 1.5 mL/hour   Via CADD pump for home infusion      <10% difference, ok to treat with final written dose     By my signature below, I confirm this process was performed independently with the BSA and all final " chemotherapy dosing calculations congruent. I have reviewed the above chemotherapy order and that my calculation of the final dose and BSA (when applicable) corroborate those calculations of the  pharmacist. Discrepancies of 10% or greater in the written dose have been addressed and documented within the EPIC Progress notes.    Asha Krishnan, PharmD

## 2021-03-23 NOTE — PROGRESS NOTES
Pt ambulatory to \[facility]\"" for pre-chemo lab draw.  PORT accessed using sterile technique, brisk blood return noted, labs drawn per orders, flushed per protocol.  Pt to return at 0800 for chemo.  Pt left in care of self in NAD to her MDs office.

## 2021-03-23 NOTE — PROGRESS NOTES
Subjective:      Karlene Walters is a 80 y.o. 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 She presents unaccompanied for today's visit.  Patient is scheduled to receive cycle #126 ([facility] #118) single agent 5-FU which she continues on every 2-week interval.      Cancer History  Patient with a long history of rectal carcinoma initially diagnosed in 2012.  She was found to have metastatic disease to liver and lung in 2015.  She is status post liver ablation as well as has received Y 90 in the past.  She was on XELOX initially but had allergic reaction to oxaliplatin (last dose 7/28/15) and was switched to single agent 5-FU. Patient initially on 5-FU at 2400 mg/m2 with Leucovorin and 5-FU push starting on 10/27/15, followed by 20% dose reduction to 1920 mg/m2 for cycle 18 (7/5/16), followed by deletion of Leucovorin and 5-FU push on 10/31/17. In January 2020 patient was changed to every 3-week cycle at cycle #99 ([facility] #91) and then changed to every 4-week interval in May 2020.  It was at that time there was noted to have growth of disease within the liver and was seen by Dr. West, surgeon.  Patient did undergo surgery in hopes of resection of the tumor but it was felt to be too close to the hilum and therefore she did undergo ablation instead on 5/28/20.  She has been back on single agent 5-FU at every 2-week interval, restarted on 6/16/20. Last CT 10/06/20 showed the tumor ablation cavities within the right lobe of the liver have decreased in size since previous examination.  There is a 1.5 x 3.1 cm focal area of enhancement adjacent to the ablation cavity that appears to have increased concerning for possible tumor recurrence.  Based on that finding she was sent for an MRI for further evaluation on 10/8/2020 which confirmed that the appearance of the masses noted in the liver did not have  an appearance for suspicious finding for recurrent metastatic disease.  MRI of the abdomen completed on 1/11/2021 showed changes consistent with a partial right hepatectomy.  The ablation cavity showed no enhancement, alteration of diffusion signal or change, and therefore the reading radiologist stated this is noted to be a complete response to therapy.  MRI does also note a very stable 2.2 x 2.1 cm enhancing right cardiophrenic angle mass.  Questionable whether this is either adenopathy versus less likely a pulmonary parenchymal mass.  Patient does follow-up with pulmonary scheduled May 2021.  Per surgeon and Dr. Steele we will continue to monitor patient with a follow-up MRI abdomen in 3 months, 4/5/21.  We will continue on current treatment as planned.     3/9/21 patient had experienced significant fatigue and shortness of breath.  This had progressively worsened and she was also seen by cardiology who recommended patient be admitted to the hospital for further cardiac work-up.  Echocardiogram completed which showed an ejection fraction of 65%.  CT chest with contrast completed as well which is stable.  Patient was discharged home with no conclusion as to her significant shortness of breath and fatigue.  We did hold cycle 126 ([place] 118) due to her clinical status.    CT chest completed on 3/9/2021 noted no consolidation.  There was a 1.7 x 2.6 over nodule opacity in the right lung base which appears to crossover from the liver lesion.  This was previously seen on prior images as well.  The opacity has changed in size, suspected to be related to the liver ablation just adjacent to this location.  The partially visualized ablated cavity in the hepatic dome was noted as well.     Interval History  Patient presents today after treatment break x1 cycle after significant fatigue and shortness of breath, status post hospitalization.  Patient stated she is feeling back to baseline at this time.  The last 2 weeks at  "home she has been resting, and states she did sleep quite a bit.  She did mention that her family was very helpful around her home and patient was able to take some time to rest.  She has had significant improvement.  She did state that she is taking the inhaler, stiloto again.  She plans to discuss with pulmonary in May as this is quite expensive medication, $400/month.  She does continue to have shortness of breath which is intermittent but at her baseline.  She does also continue to have fatigue but is at her baseline.  She had some intermittent heart palpitations at times.  She takes Lasix only as needed, required 1 dose this week.  She does continue to have nausea which she takes antiemetics for with positive results.  This occurs just a few times per week.  She does state that she has difficulty staying hydrated but does attempt to at least take in 48 ounces per day.  Her output via her ostomy is per her baseline as well.  She is ambulating with a cane for stability.  She does note some lightheadedness when she stands up too fast.    Allergies   Allergen Reactions   • Oxaliplatin Anaphylaxis   • Codeine      \"gets drunk\"   • Pcn [Penicillins] Itching     itching   • Sulfa Drugs Itching     itching   • Tape Rash     PAPER TAPE OK     Current Outpatient Medications on File Prior to Visit   Medication Sig Dispense Refill   • albuterol 108 (90 Base) MCG/ACT Aero Soln inhalation aerosol Inhale 2 Puffs every 6 hours as needed for Shortness of Breath. 1 Each 11   • rivaroxaban (XARELTO) 10 MG Tab tablet Take 1 Tab by mouth every day. 30 Tab 5   • furosemide (LASIX) 40 MG Tab      • potassium chloride ER (KLOR-CON) 10 MEQ tablet Take 1 Tab by mouth every day. 100 Tab 3   • ondansetron (ZOFRAN) 4 MG Tab tablet Take 1 Tab by mouth every four hours as needed. 30 Tab 5   • lidocaine-prilocaine (EMLA) 2.5-2.5 % Cream Apply to port 1 hour prior to access of port and cover with plastic wrap. 30 g 3   • Tiotropium " "Bromide-Olodaterol (STIOLTO RESPIMAT) 2.5-2.5 MCG/ACT Aero Soln Take 2 Puffs by mouth every day. 1 Inhaler 11   • metronidazole (METROCREAM) 0.75 % cream Apply 1 Each to affected area(s) 2 times a day.     • cyanocobalamin (VITAMIN B-12) 500 MCG Tab Take 1,000 mcg by mouth every day.     • Multiple Vitamins-Minerals (CENTRUM SILVER PO) Take 1 Tab by mouth every day.     • Cholecalciferol (VITAMIN D3) 400 UNITS CAPS Take 1 Cap by mouth every day.     • loratadine (CLARITIN) 10 MG TABS Take 10 mg by mouth every day. Indications: Hayfever     • Tiotropium Bromide-Olodaterol (STIOLTO RESPIMAT) 2.5-2.5 MCG/ACT Aero Soln Take 2 Puffs by mouth every day. (Patient not taking: Reported on 3/23/2021) 2 Each 0     Current Facility-Administered Medications on File Prior to Visit   Medication Dose Route Frequency Provider Last Rate Last Admin   • dexamethasone (DECADRON) injection 8 mg  8 mg Intravenous Once Lucho Steele M.D.             Review of Systems   Constitutional: Positive for malaise/fatigue. Negative for chills, fever and weight loss.   Respiratory: Positive for shortness of breath (intermittent at times but improved from 2 weeks).    Cardiovascular: Positive for palpitations (mild and intermittent). Negative for chest pain and leg swelling.        Takes Lasix only as needed - required 1 dose this week   Gastrointestinal: Positive for nausea. Negative for constipation, diarrhea and vomiting.   Genitourinary: Negative for dysuria.   Musculoskeletal: Negative for myalgias.   Skin: Negative for itching and rash.        Dry skin from chemo   Neurological: Positive for dizziness (lightheadedness if stands up too fast). Negative for headaches.          Objective:     /70   Pulse 97   Temp 36.8 °C (98.2 °F) (Temporal)   Resp 20   Ht 1.651 m (5' 5\")   Wt 70.8 kg (156 lb 3.1 oz)   LMP  (LMP Unknown)   SpO2 97%   BMI 25.99 kg/m²      Physical Exam  Vitals reviewed.   Constitutional:       General: She is not in " acute distress.     Appearance: Normal appearance. She is not diaphoretic.   HENT:      Head: Normocephalic and atraumatic.   Cardiovascular:      Rate and Rhythm: Normal rate and regular rhythm.      Heart sounds: Normal heart sounds. No murmur. No friction rub. No gallop.    Pulmonary:      Effort: Pulmonary effort is normal. No respiratory distress.      Breath sounds: Normal breath sounds. No wheezing.   Abdominal:      General: Bowel sounds are normal. There is no distension.      Palpations: Abdomen is soft.      Tenderness: There is no abdominal tenderness.   Musculoskeletal:         General: No swelling or tenderness. Normal range of motion.   Lymphadenopathy:      Head:      Right side of head: No submental, submandibular, tonsillar, preauricular, posterior auricular or occipital adenopathy.      Left side of head: No submental, submandibular, tonsillar, preauricular, posterior auricular or occipital adenopathy.      Cervical: No cervical adenopathy.      Right cervical: No superficial, deep or posterior cervical adenopathy.     Left cervical: No superficial, deep or posterior cervical adenopathy.      Upper Body:      Right upper body: No supraclavicular adenopathy.      Left upper body: No supraclavicular adenopathy.   Skin:     General: Skin is warm and dry.   Neurological:      Mental Status: She is alert and oriented to person, place, and time.   Psychiatric:         Mood and Affect: Mood normal.         Behavior: Behavior normal.         Results for KANG HALL (MRN [id number])    Ref. Range 3/23/2021 07:15   WBC Latest Ref Range: 4.8 - 10.8 K/uL 8.1   RBC Latest Ref Range: 4.20 - 5.40 M/uL 4.54   Hemoglobin Latest Ref Range: 12.0 - 16.0 g/dL 11.7 (L)   Hematocrit Latest Ref Range: 37.0 - 47.0 % 38.8   MCV Latest Ref Range: 81.4 - 97.8 fL 85.5   MCH Latest Ref Range: 27.0 - 33.0 pg 25.8 (L)   MCHC Latest Ref Range: 33.6 - 35.0 g/dL 30.2 (L)   RDW Latest Ref Range: 35.9 - 50.0 fL 55.5 (H)    Platelet Count Latest Ref Range: 164 - 446 K/uL 363   MPV Latest Ref Range: 9.0 - 12.9 fL 10.0   Neutrophils-Polys Latest Ref Range: 44.00 - 72.00 % 62.00   Neutrophils (Absolute) Latest Ref Range: 2.00 - 7.15 K/uL 5.00   Lymphocytes Latest Ref Range: 22.00 - 41.00 % 21.00 (L)   Lymphs (Absolute) Latest Ref Range: 1.00 - 4.80 K/uL 1.69   Monocytes Latest Ref Range: 0.00 - 13.40 % 11.90   Monos (Absolute) Latest Ref Range: 0.00 - 0.85 K/uL 0.96 (H)   Eosinophils Latest Ref Range: 0.00 - 6.90 % 2.60   Eos (Absolute) Latest Ref Range: 0.00 - 0.51 K/uL 0.21   Basophils Latest Ref Range: 0.00 - 1.80 % 1.50   Baso (Absolute) Latest Ref Range: 0.00 - 0.12 K/uL 0.12   Immature Granulocytes Latest Ref Range: 0.00 - 0.90 % 1.00 (H)   Immature Granulocytes (abs) Latest Ref Range: 0.00 - 0.11 K/uL 0.08   Nucleated RBC Latest Units: /100 WBC 0.00   NRBC (Absolute) Latest Units: K/uL 0.00   Sodium Latest Ref Range: 135 - 145 mmol/L 133 (L)   Potassium Latest Ref Range: 3.6 - 5.5 mmol/L 3.8   Chloride Latest Ref Range: 96 - 112 mmol/L 99   Co2 Latest Ref Range: 20 - 33 mmol/L 22   Anion Gap Latest Ref Range: 7.0 - 16.0  12.0   Glucose Latest Ref Range: 65 - 99 mg/dL 101 (H)   Bun Latest Ref Range: 8 - 22 mg/dL 17   Creatinine Latest Ref Range: 0.50 - 1.40 mg/dL 1.07   GFR If  Latest Ref Range: >60 mL/min/1.73 m 2 60   GFR If Non  Latest Ref Range: >60 mL/min/1.73 m 2 49 (A)   Calcium Latest Ref Range: 8.5 - 10.5 mg/dL 9.3   AST(SGOT) Latest Ref Range: 12 - 45 U/L 43   ALT(SGPT) Latest Ref Range: 2 - 50 U/L 28   Alkaline Phosphatase Latest Ref Range: 30 - 99 U/L 333 (H)   Total Bilirubin Latest Ref Range: 0.1 - 1.5 mg/dL 0.5   Albumin Latest Ref Range: 3.2 - 4.9 g/dL 3.5   Total Protein Latest Ref Range: 6.0 - 8.2 g/dL 7.3   Globulin Latest Ref Range: 1.9 - 3.5 g/dL 3.8 (H)   A-G Ratio Latest Units: g/dL 0.9   Carcinoembryonic Antigen Latest Ref Range: 0.0 - 3.0 ng/mL 1.4         CT-CHEST (THORAX)  WITH    Result Date: 3/9/2021  3/9/2021 4:02 PM HISTORY/REASON FOR EXAM:  Cough; Shortness of breath; patient with metastatic rectal carcinoma with severe SOB and cough; patient with metastatic rectal carcinoma with severe SOB and cough TECHNIQUE/EXAM DESCRIPTION: CT scan of the chest with contrast. Thin-section helical images were obtained from the lung apices through the adrenal glands following the bolus administration of 75 mL of Omnipaque 350 nonionic contrast. Low dose optimization technique was utilized for this CT exam including automated exposure control and adjustment of the mA and/or kV according to patient size. COMPARISON:  5/7/2020. 10/5/2020. MR 1/11/2021 FINDINGS: Lungs: There is a 1.7 x 2.6 cm nodular opacity in the right lung base of crossing from the liver lesion, previously measured 1.6 x 2.5 cm in 5/7/ 2020 and 0.8 x 1.0 cm in 10/5/2020. Airway: Patent Pleura: No pleural effusion or pneumothorax. Thoracic aorta and great vessels:  No aneurysm. Pulmonary arteries: . Heart and pericardium:   No significant coronary artery calcification. No pericardial effusion. Lymph nodes: Grossly unchanged right pericardial lymph nodes, measuring 1.3 cm Thoracic spine:  No fracture or malalignment. No compression deformity. Chest wall:   Unremarkable. Visualized abdomen: Partially visualized ablation cavity in the hepatic dome measuring 4.4 x 6.3 cm, previously 4.3 x 6.8 cm ___________________________________     1. No consolidation to suggest pneumonia. 2. There is a 1.7 x 2.6 cm nodular opacity in the right lung base of crossing from the liver lesion. This opacity has changed in size with the ablation, previously measured 1.6 x 2.5 cm in 5/7/ 2020 and 0.8 x 1.0 cm in 10/5/2020. This is suspected to be burned area adjacent to ablation. Continued follow-up is recommended. 3. Partially visualized ablation cavity in the hepatic dome measuring 4.4 x 6.3 cm, previously 4.3 x 6.8 cm          Assessment/Plan:         1. Malignant neoplasm of rectum (HCC)  DISCONTINUED: NS infusion 500 mL   2. Secondary malignant neoplasm of liver (HCC)     3. History of pulmonary embolus (PE)     4. Current use of long term anticoagulation     5. Elevated alkaline phosphatase level     6. SOB (shortness of breath)     7. Cancer related pain         1.  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  She is continuing on single agent 5-FU, scheduled for cycle #126 ([place] #118) today.  Clinically she is at her baseline status and feeling well.  I did review her CBC and CMP and her labs are appropriate to proceed with treatment as planned.  Patient has difficulty maintaining hydration.  She does attempt to take in 48 ounces per day, however I will provide patient with some mild hydration during her chemotherapy on day 1 as well as day 3 during her pump disconnect.  I will give her 500 cc of fluid over 1 hour on both days and attempt to assist in hydration during her treatment.    Patient is scheduled for her 3-month follow-up MRI in 2 weeks.  She is scheduled for follow-up visit with Dr. Steele, as well as surgeon, Dr. Mclaughlin.  Her CEA is stable at 1.4.    2.  Patient has had waxing and waning of her alkaline phosphatase results.  We are monitoring this closely, with a level of 333 this week, up from 241, 2 weeks ago. MRI of liver scheduled in 2 weeks for follow up.    3.  Patient with a history of pulmonary embolism, currently on low-dose Xarelto.  Patient to continue on this medication as prescribed.    4.  Patient continues to have shortness of breath which has been followed up and worked up significantly in the past.  Her shortness of breath has returned to her baseline prior to 2 weeks ago.  She does continue to follow-up with pulmonary which she is scheduled for May 2021.  She is continuing on the medication Stiloto inhaler at this time, however plans to discuss further with pulmonary as it is quite expensive per month.  Will  defer to pulmonary.  CT chest completed on 3/9/2021 is stable.    5.  Patient also noted to have cancer related pain, primarily following her chemotherapy every 2 weeks.  She does require oxycodone intermittently, just a few days after her treatment with positive results.    6.  Patient to continue to follow-up in the clinic in 2 weeks prior to her next cycle of chemotherapy, post MRI of the abdomen for further evaluation.  Patient to contact office sooner if needed.

## 2021-03-23 NOTE — PROGRESS NOTES
Chemotherapy Verification - PRIMARY RN  C118 D1      Height = 1.62 m  Weight = 70.9 kg  BSA = 1.79 m2       Medication: fluorouracil  Dose: 1920 mg/m2  Calculated Dose: 3436.8 mg                             (In mg/m2, AUC, mg/kg)       I confirm this process was performed independently with the BSA and all final chemotherapy dosing calculations congruent.  Any discrepancies of 10% or greater have been addressed with the chemotherapy pharmacist. The resolution of the discrepancy has been documented in the EPIC progress notes.

## 2021-03-23 NOTE — PROGRESS NOTES
Chemotherapy Verification - SECONDARY RN       Height = 162 cm  Weight = 70.9 kg  BSA = 1.786 m2       Medication: Home infusion 5FU  Dose: 1920 mg/m2 dr Calculated Dose: 3429.49 mg over 46 hours                            (In mg/m2, AUC, mg/kg)       I confirm that this process was performed independently.

## 2021-03-23 NOTE — PROGRESS NOTES
"Pharmacy Chemotherapy Verification Note:    Patient Name: Karlene Walters      Dx: Colon Ca        Cycle 125 ([facility] cycle 118)  -delayed for imaging and hydration  Previous treatment: 2/23/21    Protocol: 5-FU + Leucovorin      followed by     7/5/16: Dose reduced by 20% to 320 mg/m² starting with Cycle 18 ([facility] #11)  due to neutropenia     10/31/17: delete Leucovorin and 5-FU push d/t neutropenia per Dr. Hardy   Fluorouracil continuous infusion over 46-48 hours     7/5/16: Infusion Dose reduced by 20% starting with Cycle 18 ([facility] #11)  due to neutropenia    20% reduction (1920 mg/m²) to be continued starting C28 per C Alsop APRN   14 day cycles for 12 cycles (adjuvant) or until disease progression or unacceptable toxicity  NCCN Guidelines for Colon Cancer. V.2.2015.  Jay GROVER, et al. Eur J Cancer.1999;35(9):1343-7.     Allergies:  Oxaliplatin; Codeine; Pcn [penicillins]; Sulfa drugs; and Tape     /75   Pulse 77   Temp 36.2 °C (97.2 °F) (Temporal)   Resp 18   Ht 1.62 m (5' 3.78\")   Wt 70.9 kg (156 lb 4.9 oz)   LMP  (LMP Unknown)   SpO2 97%   BMI 27.02 kg/m²  Body surface area is 1.79 meters squared.    Labs from 3/23/21reviewed - all within treatment parameters.       Fluorouracil (5-FU) 1920 mg/m² x 1.79m² =  3436.8mg   <10% difference, okay to treat with final dose = 3435mg CIVI over 46 hrs                 Nakia Arroyo, PharmD      "

## 2021-03-23 NOTE — PROGRESS NOTES
Pt returns from MD appt for 5FU CADD pump for rectal cancer.  Pt w/ no s/s of infection, pt has no complaints at this time.  Pt PORT already accessed from lab draw appt.  Pt reporting Ella MORALES will be calling this RN to add hydration to pt's tx plan.  Call received from Ella, orders placed in pt's tx plan.  500cc of NS to be given over 1 hour on connect and disconnect days.  NS and decadron given prior to pump connect w/ no AE.  PORT w/ brisk blood return, flushed per protocol, CADD pump connected to PORT, all clamps opened, pump confirmed to be in RUN mode, placed in pt's konrad-pack w/ extra batteries.  Pt left on foot in care of self in NAD.  Confirmed pt's disconnect appt.

## 2021-03-25 ENCOUNTER — OUTPATIENT INFUSION SERVICES (OUTPATIENT)
Dept: ONCOLOGY | Facility: MEDICAL CENTER | Age: 81
End: 2021-03-25
Attending: INTERNAL MEDICINE
Payer: MEDICARE

## 2021-03-25 VITALS
DIASTOLIC BLOOD PRESSURE: 77 MMHG | WEIGHT: 156.31 LBS | RESPIRATION RATE: 18 BRPM | BODY MASS INDEX: 26.69 KG/M2 | TEMPERATURE: 97.8 F | OXYGEN SATURATION: 98 % | HEART RATE: 113 BPM | HEIGHT: 64 IN | SYSTOLIC BLOOD PRESSURE: 140 MMHG

## 2021-03-25 DIAGNOSIS — C20 MALIGNANT NEOPLASM OF RECTUM (HCC): ICD-10-CM

## 2021-03-25 DIAGNOSIS — R94.4 DECREASED GFR: ICD-10-CM

## 2021-03-25 PROCEDURE — 96360 HYDRATION IV INFUSION INIT: CPT

## 2021-03-25 PROCEDURE — 700111 HCHG RX REV CODE 636 W/ 250 OVERRIDE (IP): Performed by: INTERNAL MEDICINE

## 2021-03-25 PROCEDURE — 700105 HCHG RX REV CODE 258: Performed by: NURSE PRACTITIONER

## 2021-03-25 RX ORDER — HEPARIN SODIUM (PORCINE) LOCK FLUSH IV SOLN 100 UNIT/ML 100 UNIT/ML
500 SOLUTION INTRAVENOUS PRN
Status: DISCONTINUED | OUTPATIENT
Start: 2021-03-25 | End: 2021-03-25 | Stop reason: HOSPADM

## 2021-03-25 RX ORDER — SODIUM CHLORIDE 9 MG/ML
500 INJECTION, SOLUTION INTRAVENOUS ONCE
Status: COMPLETED | OUTPATIENT
Start: 2021-03-25 | End: 2021-03-25

## 2021-03-25 RX ADMIN — SODIUM CHLORIDE 500 ML: 9 INJECTION, SOLUTION INTRAVENOUS at 11:35

## 2021-03-25 RX ADMIN — HEPARIN 500 UNITS: 100 SYRINGE at 12:36

## 2021-03-25 ASSESSMENT — FIBROSIS 4 INDEX: FIB4 SCORE: 1.79

## 2021-03-25 NOTE — PROGRESS NOTES
Pt arrived ambulatory to [facility] for Hydration and  5FU CADD pump disconnect after 46 hours of continuous infusion. Pump stopped with 0 ml remaining in reservoir, 70.40 ml delivered.  Pump disconnected and cleaned, placed in patient labeled bag and returned to pharmacy.      Hydration administered per MAR, pt tolerated well.  PORT flushed per protocol with brisk blood return observed, heparin instilled, de-accessed, gauze and tape to site.  Pt discharged to self care in [facility], next appointment confirmed.

## 2021-03-31 DIAGNOSIS — Z95.828 PORT-A-CATH IN PLACE: ICD-10-CM

## 2021-03-31 RX ORDER — HEPARIN SODIUM (PORCINE) LOCK FLUSH IV SOLN 100 UNIT/ML 100 UNIT/ML
500 SOLUTION INTRAVENOUS PRN
Status: CANCELLED | OUTPATIENT
Start: 2021-04-05

## 2021-03-31 RX ORDER — 0.9 % SODIUM CHLORIDE 0.9 %
20 VIAL (ML) INJECTION PRN
Status: CANCELLED | OUTPATIENT
Start: 2021-04-05

## 2021-04-01 RX ORDER — DEXAMETHASONE SODIUM PHOSPHATE 4 MG/ML
8 INJECTION, SOLUTION INTRA-ARTICULAR; INTRALESIONAL; INTRAMUSCULAR; INTRAVENOUS; SOFT TISSUE ONCE
Status: CANCELLED | OUTPATIENT
Start: 2021-05-18 | End: 2021-05-18

## 2021-04-01 RX ORDER — HEPARIN SODIUM (PORCINE) LOCK FLUSH IV SOLN 100 UNIT/ML 100 UNIT/ML
500 SOLUTION INTRAVENOUS PRN
Status: CANCELLED | OUTPATIENT
Start: 2021-05-06

## 2021-04-01 RX ORDER — SODIUM CHLORIDE 9 MG/ML
500 INJECTION, SOLUTION INTRAVENOUS ONCE
Status: CANCELLED
Start: 2021-05-18 | End: 2021-05-18

## 2021-04-01 RX ORDER — ONDANSETRON 2 MG/ML
4 INJECTION INTRAMUSCULAR; INTRAVENOUS
Status: CANCELLED | OUTPATIENT
Start: 2021-05-18

## 2021-04-01 RX ORDER — DEXAMETHASONE SODIUM PHOSPHATE 4 MG/ML
8 INJECTION, SOLUTION INTRA-ARTICULAR; INTRALESIONAL; INTRAMUSCULAR; INTRAVENOUS; SOFT TISSUE ONCE
Status: CANCELLED | OUTPATIENT
Start: 2021-05-04 | End: 2021-05-04

## 2021-04-01 RX ORDER — DEXTROSE MONOHYDRATE 50 MG/ML
INJECTION, SOLUTION INTRAVENOUS CONTINUOUS
Status: CANCELLED | OUTPATIENT
Start: 2021-05-18

## 2021-04-01 RX ORDER — SODIUM CHLORIDE 9 MG/ML
500 INJECTION, SOLUTION INTRAVENOUS ONCE
Status: CANCELLED
Start: 2021-05-04 | End: 2021-05-04

## 2021-04-01 RX ORDER — HEPARIN SODIUM (PORCINE) LOCK FLUSH IV SOLN 100 UNIT/ML 100 UNIT/ML
500 SOLUTION INTRAVENOUS PRN
Status: CANCELLED | OUTPATIENT
Start: 2021-05-18

## 2021-04-01 RX ORDER — SODIUM CHLORIDE 9 MG/ML
500 INJECTION, SOLUTION INTRAVENOUS ONCE
Status: CANCELLED
Start: 2021-05-20 | End: 2021-05-20

## 2021-04-01 RX ORDER — PROCHLORPERAZINE MALEATE 10 MG
10 TABLET ORAL EVERY 6 HOURS PRN
Status: CANCELLED | OUTPATIENT
Start: 2021-05-18

## 2021-04-01 RX ORDER — PROCHLORPERAZINE MALEATE 10 MG
10 TABLET ORAL EVERY 6 HOURS PRN
Status: CANCELLED | OUTPATIENT
Start: 2021-05-04

## 2021-04-01 RX ORDER — ONDANSETRON 8 MG/1
8 TABLET, ORALLY DISINTEGRATING ORAL
Status: CANCELLED | OUTPATIENT
Start: 2021-05-04

## 2021-04-01 RX ORDER — SODIUM CHLORIDE 9 MG/ML
500 INJECTION, SOLUTION INTRAVENOUS ONCE
Status: CANCELLED
Start: 2021-05-06 | End: 2021-05-06

## 2021-04-01 RX ORDER — LIDOCAINE HYDROCHLORIDE 10 MG/ML
10 INJECTION, SOLUTION INFILTRATION; PERINEURAL ONCE
Status: CANCELLED | OUTPATIENT
Start: 2021-05-18 | End: 2021-05-18

## 2021-04-01 RX ORDER — ONDANSETRON 8 MG/1
8 TABLET, ORALLY DISINTEGRATING ORAL
Status: CANCELLED | OUTPATIENT
Start: 2021-05-18

## 2021-04-01 RX ORDER — DEXTROSE MONOHYDRATE 50 MG/ML
INJECTION, SOLUTION INTRAVENOUS CONTINUOUS
Status: CANCELLED | OUTPATIENT
Start: 2021-05-04

## 2021-04-01 RX ORDER — ONDANSETRON 2 MG/ML
4 INJECTION INTRAMUSCULAR; INTRAVENOUS
Status: CANCELLED | OUTPATIENT
Start: 2021-05-04

## 2021-04-01 RX ORDER — LIDOCAINE HYDROCHLORIDE 10 MG/ML
10 INJECTION, SOLUTION INFILTRATION; PERINEURAL ONCE
Status: CANCELLED | OUTPATIENT
Start: 2021-05-04 | End: 2021-05-04

## 2021-04-01 RX ORDER — HEPARIN SODIUM (PORCINE) LOCK FLUSH IV SOLN 100 UNIT/ML 100 UNIT/ML
500 SOLUTION INTRAVENOUS PRN
Status: CANCELLED | OUTPATIENT
Start: 2021-05-04

## 2021-04-01 RX ORDER — HEPARIN SODIUM (PORCINE) LOCK FLUSH IV SOLN 100 UNIT/ML 100 UNIT/ML
500 SOLUTION INTRAVENOUS PRN
Status: CANCELLED | OUTPATIENT
Start: 2021-05-20

## 2021-04-01 NOTE — PROGRESS NOTES
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 She was initially diagnosed in 2012 with KRAS mutated, MSI stable rectal cancer s/p abdominoperineal resection. Liver and lung metastases diagnosed in 2015. She was initially treated with XELOX but had allergic reaction to the oxaliplatin and was switched to single agent 5FU which she has remained on ever since. She has had Y90 and several liver ablations by Dr. Geller. We has attempted to spread out the intervals between 5FU infusions but her CEA went up and she developed enlarging liver lesions when we did this so it was resumed at her prior q.o.w schedule. She was admitted by cardiology on 3/9/21 with new onset SOB but ECHOcardiogram showed an EF of 65% and CT chest was stable w/o explanation for the shortness of breath. Her alkaline phosphatase has been elevated for quite some time, but increasing recently. MRI liver was done earlier today - report not yet available. The nodule on the top of the liver than masquerades as a lung nodule on the chest CT looks a tad larger to me. CEA is stable at 1.4 today. Alkaline phosphatase actually down from 333 to 256. She gets chemo tomorrow with hydration - the latter does make her feel better.     ROS:    Constitutional: No weight loss  Skin: No rash or jaundice  HENT: No change in eyesight or hearing  Cardiovascular:No chest pain or arrythmia  Respiratory:Intermittant  SOB  GI:No nausea, vomiting, diarrhea, constipation  :No dysuria or frequency  Musculoskeletal:No bone or joint pain  Neuro:No sx's of neuropathy  Psych: No complaints    PMH:      Allergies   Allergen Reactions   • Oxaliplatin Anaphylaxis   • Codeine      \"gets drunk\"   • Pcn [Penicillins] Itching     itching   • Sulfa Drugs Itching     itching   • Tape Rash     PAPER TAPE OK       Past Medical History:   Diagnosis Date "   • Adverse effect of anesthesia     elevated BP postop   • Anesthesia    • Arrhythmia     occasional   • Blood clotting disorder (HCC) 08/2015    bilat PE    • Blood transfusion 1957   • Breath shortness     pt reports d/t chemo treatment; no O2; with moderate exertion; no c/o at this time   • Cancer (HCC) 09/2012    rectal cancer,  Liver CA-2015   • CATARACT     removed b/l   • Chemotherapy-induced neutropenia (HCC) 9/28/2016   • Chickenpox     history of   • Colon cancer (HCC)    • Dental disorder     dentures UPPERS AND LOWERS   • Elevated alkaline phosphatase level 11/15/2017   • Emphysema of lung (HCC)     COPD   • Fall    • [place] measles     history of   • Heart murmur     as a child   • Hemorrhagic disorder (HCC)     on Xarelto    • High cholesterol    • Hypercholesteremia    • Hypertension     no meds currently    • Ileostomy in place (HCC)    • Ileostomy in place (HCC)    • Indigestion    • Influenza     history of   • Lightheadedness 9/17/2015   • Mumps     history of   • Obstruction     ileostomy   • Other specified symptom associated with female genital organs     HYSTERECTOMY 1993   • Pneumonia 05/2017    tx'd with antibx   • Pulmonary embolism (HCC)    • 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)    • Renal insufficiency 3/14/2016   • Restless legs 5/6/2020   • Routine general medical examination at a health care facility 3/14/2016    3/14/16   • Seasonal allergies    • Swelling of both ankles     Lasix PRN   • Tonsillitis         Past Surgical History:   Procedure Laterality Date   • HEPATIC ABLATION  5/28/2020    Procedure: ABLATION, LESION, LIVER-TRISEGMENTECTOMY, MICROWAVE ABLATION, INTRA OPERATIVE ULTRA SOUND, SIMPLE RESECTED / REPAIR OF DIAPHRAGM;  Surgeon: Kit West M.D.;  Location: SURGERY [facility];  Service: General   • HEPATIC ABLATION LAPAROSCOPIC  11/15/2018    Procedure: HEPATIC ABLATION LAPAROSCOPIC.- SEGMENT 7/8;  Surgeon: Kit West M.D.;   Location: SURGERY [facility];  Service: General   • COLONOSCOPY WITH BIOPSY  8/23/2015    Procedure: COLONOSCOPY WITH BIOPSY;  Surgeon: Wilbur Glasgow M.D.;  Location: [facility];  Service:    • HEPATIC ABLATION LAPAROSCOPIC  7/6/2015    Procedure: HEPATIC ABLATION LAPAROSCOPIC.;  Surgeon: Kit West M.D.;  Location: SURGERY [facility];  Service:    • CATH PLACEMENT Left 7/6/2015    Procedure: CATH PLACEMENT CEPHALIC POWERPORT;  Surgeon: Kit West M.D.;  Location: SURGERY [facility];  Service:    • FISTULA IN ANO REPAIR  1/28/2015    Performed by Kolton Montgomery M.D. at SURGERY [facility]   • PERINEAL PROCEDURE  1/28/2015    Performed by Kolton Montgomery M.D. at [facility]   • FISTULA IN ANO REPAIR  10/15/2014    Performed by Kolton Montgomery M.D. at SURGERY [facility]   • PERINEAL PROCEDURE  10/15/2014    Performed by Kolton Montgomery M.D. at [facility]   • IRRIGATION & DEBRIDEMENT GENERAL  2/19/2014    Performed by Kolton Montgomery M.D. at [facility]   • FLAP GRAFT  2/19/2014    Performed by Kolton Montgomery M.D. at [facility]   • PERINEAL PROCEDURE  12/18/2013    Performed by Kolton oMntgomery M.D. at [facility]   • MYOCUTANEOUS FLAP  12/18/2013    Performed by Kolton Montgmoery M.D. at [facility]   • IRRIGATION & DEBRIDEMENT GENERAL  10/23/2013    Performed by Kolton Montgomery M.D. at [facility]   • FISTULA IN ANO REPAIR  9/4/2013    Performed by Kolton Montgomery M.D. at [facility]   • FLAP ROTATION  9/4/2013    Performed by Kolton Montgomery M.D. at [facility]   • RECOVERY  8/26/2013    Performed by Cath-Recovery Surgery at SURGERY [facility] DAY [facility]   • BIOPSY GENERAL  7/17/2013    Performed by Kolton Montgomery M.D. at [facility]   • PROCTOSCOPY  7/17/2013    Performed by Kolton Montgomery M.D. at SURGERY [facility]   • FISTULA IN ANO REPAIR   2/27/2013    Performed by Kolton Montgomery M.D. at SURGERY [facility]   • SIGMOIDOSCOPY  2/27/2013    Performed by Kolton Montgomery M.D. at SURGERY [facility]   • LAPAROSCOPY  10/8/2012    Performed by Kolton Montgomery M.D. at SURGERY [facility]   • ILEO LOOP DIVERSION  10/8/2012    Performed by Kolton Montgomery M.D. at SURGERY [facility]   • COLECTOMY  9/26/2012    Performed by Kolton Montgomery M.D. at SURGERY [facility]   • COLONOSCOPY FLEX W/ENDO US  9/20/2012    Performed by Harshil Bolton M.D. at SURGERY [facility]   • OTHER  2009    left eye torn retina repair   • CATARACT EXTRACTION WITH IOL  2004    bilateral   • ABDOMINAL HYSTERECTOMY TOTAL  1983   • CYST EXCISION  1972    ovarian   • COLON RESECTION     • EYE SURGERY      cataracts   • HYSTERECTOMY LAPAROSCOPY     • PB REMV 2ND CATARACT,CORN-SCLER SECTN     • TONSILLECTOMY          Medications:    Current Outpatient Medications on File Prior to Visit   Medication Sig Dispense Refill   • albuterol 108 (90 Base) MCG/ACT Aero Soln inhalation aerosol Inhale 2 Puffs every 6 hours as needed for Shortness of Breath. 1 Each 11   • rivaroxaban (XARELTO) 10 MG Tab tablet Take 1 Tab by mouth every day. 30 Tab 5   • potassium chloride ER (KLOR-CON) 10 MEQ tablet Take 1 Tab by mouth every day. 100 Tab 3   • lidocaine-prilocaine (EMLA) 2.5-2.5 % Cream Apply to port 1 hour prior to access of port and cover with plastic wrap. 30 g 3   • Tiotropium Bromide-Olodaterol (STIOLTO RESPIMAT) 2.5-2.5 MCG/ACT Aero Soln Take 2 Puffs by mouth every day. 1 Inhaler 11   • cyanocobalamin (VITAMIN B-12) 500 MCG Tab Take 1,000 mcg by mouth every day.     • Multiple Vitamins-Minerals (CENTRUM SILVER PO) Take 1 Tab by mouth every day.     • Cholecalciferol (VITAMIN D3) 400 UNITS CAPS Take 1 Cap by mouth every day.     • loratadine (CLARITIN) 10 MG TABS Take 10 mg by mouth every day. Indications: Hayfever     • Tiotropium Bromide-Olodaterol (STIOLTO RESPIMAT) 2.5-2.5  "MCG/ACT Aero Soln Take 2 Puffs by mouth every day. (Patient not taking: Reported on 3/23/2021) 2 Each 0   • furosemide (LASIX) 40 MG Tab      • ondansetron (ZOFRAN) 4 MG Tab tablet Take 1 Tab by mouth every four hours as needed. (Patient not taking: Reported on 4/5/2021) 30 Tab 5   • metronidazole (METROCREAM) 0.75 % cream Apply 1 Each to affected area(s) 2 times a day.       No current facility-administered medications on file prior to visit.       Social History     Tobacco Use   • Smoking status: Never Smoker   • Smokeless tobacco: Never Used   Substance Use Topics   • Alcohol use: No        Family History   Problem Relation Age of Onset   • Heart Disease Mother    • Heart Failure Mother    • Hypertension Mother    • Hyperlipidemia Mother    • Cancer Mother    • Cancer Maternal Aunt 60        breast ca   • Lung Disease Brother         COPD/Emphysema/Smoker   • Cancer Brother         prostate cancer   • Alcohol/Drug Brother         Alcohol   • Kidney Disease Father         renal failure        Objective    Vitals:    /60   Pulse 100   Temp 36.5 °C (97.7 °F) (Temporal)   Resp 16   Ht 1.626 m (5' 4\")   Wt 71.2 kg (156 lb 15.5 oz)   LMP  (LMP Unknown)   SpO2 97%   BMI 26.94 kg/m²     Physical Exam:    Appears well-developed and well-nourished. No distress.    Head -  Normocephalic .   Eyes - Pupils are equal. Conjunctivae normal. No scleral icterus.   Ears - normal hearing  Cardiovascular - Normal rate, regular rhythm, normal heart sounds and intact distal pulses. No  gallop, murmur or rub  Pulmonary - Normal breath sounds.  No wheeze, rales or rhonci  Breast - Not examined  Abdominal -Soft. No distension, tenderness, organomegaly or mass. Colostomy.   Extremities-  Trace edema. No tenderness.    Nodes - No submental, submandibular, preauricular, cervical, axillary or inguinal adenopathy.    Neurological -   Alert and oriented   Skin - Skin is warm and dry. No rash noted. Not diaphoretic. No erythema. " No pallor. No jaundice   Psychiatric -  Normal mood and affect.    Labs:    Results for KANG HALL (MRN [id number])   Results for KANG HALL (MRN [id number]) as of 4/5/2021 11:44   Ref. Range 4/5/2021 11:10   WBC Latest Ref Range: 4.8 - 10.8 K/uL 8.0   RBC Latest Ref Range: 4.20 - 5.40 M/uL 4.29   Hemoglobin Latest Ref Range: 12.0 - 16.0 g/dL 11.2 (L)   Hematocrit Latest Ref Range: 37.0 - 47.0 % 36.8 (L)   MCV Latest Ref Range: 81.4 - 97.8 fL 85.8   MCH Latest Ref Range: 27.0 - 33.0 pg 26.1 (L)   MCHC Latest Ref Range: 33.6 - 35.0 g/dL 30.4 (L)   RDW Latest Ref Range: 35.9 - 50.0 fL 56.2 (H)   Platelet Count Latest Ref Range: 164 - 446 K/uL 295      Ref. Range 3/23/2021 07:15   Sodium Latest Ref Range: 135 - 145 mmol/L 133 (L)   Potassium Latest Ref Range: 3.6 - 5.5 mmol/L 3.8   Chloride Latest Ref Range: 96 - 112 mmol/L 99   Co2 Latest Ref Range: 20 - 33 mmol/L 22   Anion Gap Latest Ref Range: 7.0 - 16.0  12.0   Glucose Latest Ref Range: 65 - 99 mg/dL 101 (H)   Bun Latest Ref Range: 8 - 22 mg/dL 17   Creatinine Latest Ref Range: 0.50 - 1.40 mg/dL 1.07   GFR If  Latest Ref Range: >60 mL/min/1.73 m 2 60   GFR If Non  Latest Ref Range: >60 mL/min/1.73 m 2 49 (A)   Calcium Latest Ref Range: 8.5 - 10.5 mg/dL 9.3   AST(SGOT) Latest Ref Range: 12 - 45 U/L 43   ALT(SGPT) Latest Ref Range: 2 - 50 U/L 28   Alkaline Phosphatase Latest Ref Range: 30 - 99 U/L 333 (H)   Total Bilirubin Latest Ref Range: 0.1 - 1.5 mg/dL 0.5   Albumin Latest Ref Range: 3.2 - 4.9 g/dL 3.5   Total Protein Latest Ref Range: 6.0 - 8.2 g/dL 7.3   Globulin Latest Ref Range: 1.9 - 3.5 g/dL 3.8 (H)   A-G Ratio Latest Units: g/dL 0.9   Results for KANG HALL (MRN [id number])    Ref. Range 1/26/2021 07:00 2/9/2021 07:40 2/23/2021 07:10 3/9/2021 07:30 3/9/2021 20:25 3/9/2021 23:05 3/23/2021 07:15   Carcinoembryonic Antigen Latest Ref Range: 0.0 - 3.0 ng/mL 1.8 1.5 1.4 1.3   1.4   Results for KANG HALL  ROMULO (MRN [id number]) as of 4/5/2021 15:36   Ref. Range 2/9/2021 07:40 2/23/2021 07:10 3/9/2021 07:30 3/9/2021 20:25 3/10/2021 03:10 3/23/2021 07:15 4/5/2021 11:10   Alkaline Phosphatase Latest Ref Range: 30 - 99 U/L 316 (H) 264 (H) 277 (H) 241 (H)  333 (H) 256 (H)   Results for KANG HALL (MRN [id number])    Ref. Range 4/5/2021 11:10   Sodium Latest Ref Range: 135 - 145 mmol/L 137   Potassium Latest Ref Range: 3.6 - 5.5 mmol/L 3.9   Chloride Latest Ref Range: 96 - 112 mmol/L 105   Co2 Latest Ref Range: 20 - 33 mmol/L 21   Anion Gap Latest Ref Range: 7.0 - 16.0  11.0   Glucose Latest Ref Range: 65 - 99 mg/dL 103 (H)   Bun Latest Ref Range: 8 - 22 mg/dL 14   Creatinine Latest Ref Range: 0.50 - 1.40 mg/dL 0.84   GFR If  Latest Ref Range: >60 mL/min/1.73 m 2 >60   GFR If Non  Latest Ref Range: >60 mL/min/1.73 m 2 >60   Calcium Latest Ref Range: 8.5 - 10.5 mg/dL 9.5   AST(SGOT) Latest Ref Range: 12 - 45 U/L 25   ALT(SGPT) Latest Ref Range: 2 - 50 U/L 25   Alkaline Phosphatase Latest Ref Range: 30 - 99 U/L 256 (H)   Total Bilirubin Latest Ref Range: 0.1 - 1.5 mg/dL 0.5   Albumin Latest Ref Range: 3.2 - 4.9 g/dL 3.6   Total Protein Latest Ref Range: 6.0 - 8.2 g/dL 6.9   Globulin Latest Ref Range: 1.9 - 3.5 g/dL 3.3     Assessment  Lesion on liver probably a tad larger but await actual report  Imp:    Visit Diagnosis:    1. Metastatic colon cancer in female (HCC)     2. Secondary malignant neoplasm of liver (HCC)     3. Elevated alkaline phosphatase level     4. History of pulmonary embolus (PE)     5. SOB (shortness of breath)           Plan:  Talk to me tomorrow  Continue the 500 cc hydration  See Ella in 2 weeks    Lucho Steele M.D.

## 2021-04-05 ENCOUNTER — OUTPATIENT INFUSION SERVICES (OUTPATIENT)
Dept: ONCOLOGY | Facility: MEDICAL CENTER | Age: 81
End: 2021-04-05
Attending: INTERNAL MEDICINE
Payer: MEDICARE

## 2021-04-05 ENCOUNTER — HOSPITAL ENCOUNTER (OUTPATIENT)
Dept: RADIOLOGY | Facility: MEDICAL CENTER | Age: 81
End: 2021-04-05
Attending: SURGERY
Payer: MEDICARE

## 2021-04-05 ENCOUNTER — OFFICE VISIT (OUTPATIENT)
Dept: HEMATOLOGY ONCOLOGY | Facility: MEDICAL CENTER | Age: 81
End: 2021-04-05
Payer: MEDICARE

## 2021-04-05 VITALS
RESPIRATION RATE: 16 BRPM | WEIGHT: 156.97 LBS | TEMPERATURE: 97.7 F | SYSTOLIC BLOOD PRESSURE: 110 MMHG | DIASTOLIC BLOOD PRESSURE: 60 MMHG | HEART RATE: 100 BPM | HEIGHT: 64 IN | OXYGEN SATURATION: 97 % | BODY MASS INDEX: 26.8 KG/M2

## 2021-04-05 VITALS
HEIGHT: 64 IN | DIASTOLIC BLOOD PRESSURE: 78 MMHG | TEMPERATURE: 97.5 F | SYSTOLIC BLOOD PRESSURE: 147 MMHG | BODY MASS INDEX: 26.8 KG/M2 | HEART RATE: 100 BPM | WEIGHT: 156.97 LBS | OXYGEN SATURATION: 98 % | RESPIRATION RATE: 18 BRPM

## 2021-04-05 DIAGNOSIS — C18.9 METASTATIC COLON CANCER IN FEMALE: ICD-10-CM

## 2021-04-05 DIAGNOSIS — Z95.828 PORT-A-CATH IN PLACE: ICD-10-CM

## 2021-04-05 DIAGNOSIS — C78.7 SECONDARY MALIGNANT NEOPLASM OF LIVER (HCC): Chronic | ICD-10-CM

## 2021-04-05 DIAGNOSIS — K76.9 LIVER DISEASE: ICD-10-CM

## 2021-04-05 DIAGNOSIS — Z86.711 HISTORY OF PULMONARY EMBOLUS (PE): ICD-10-CM

## 2021-04-05 DIAGNOSIS — C20 MALIGNANT NEOPLASM OF RECTUM (HCC): ICD-10-CM

## 2021-04-05 DIAGNOSIS — R74.8 ELEVATED ALKALINE PHOSPHATASE LEVEL: ICD-10-CM

## 2021-04-05 DIAGNOSIS — R06.02 SOB (SHORTNESS OF BREATH): ICD-10-CM

## 2021-04-05 LAB
ALBUMIN SERPL BCP-MCNC: 3.6 G/DL (ref 3.2–4.9)
ALBUMIN/GLOB SERPL: 1.1 G/DL
ALP SERPL-CCNC: 256 U/L (ref 30–99)
ALT SERPL-CCNC: 25 U/L (ref 2–50)
ANION GAP SERPL CALC-SCNC: 11 MMOL/L (ref 7–16)
AST SERPL-CCNC: 25 U/L (ref 12–45)
BASOPHILS # BLD AUTO: 0.6 % (ref 0–1.8)
BASOPHILS # BLD: 0.05 K/UL (ref 0–0.12)
BILIRUB SERPL-MCNC: 0.5 MG/DL (ref 0.1–1.5)
BUN SERPL-MCNC: 14 MG/DL (ref 8–22)
CALCIUM SERPL-MCNC: 9.5 MG/DL (ref 8.5–10.5)
CEA SERPL-MCNC: 1.4 NG/ML (ref 0–3)
CHLORIDE SERPL-SCNC: 105 MMOL/L (ref 96–112)
CO2 SERPL-SCNC: 21 MMOL/L (ref 20–33)
CREAT SERPL-MCNC: 0.84 MG/DL (ref 0.5–1.4)
EOSINOPHIL # BLD AUTO: 0.37 K/UL (ref 0–0.51)
EOSINOPHIL NFR BLD: 4.7 % (ref 0–6.9)
ERYTHROCYTE [DISTWIDTH] IN BLOOD BY AUTOMATED COUNT: 56.2 FL (ref 35.9–50)
GLOBULIN SER CALC-MCNC: 3.3 G/DL (ref 1.9–3.5)
GLUCOSE SERPL-MCNC: 103 MG/DL (ref 65–99)
HCT VFR BLD AUTO: 36.8 % (ref 37–47)
HGB BLD-MCNC: 11.2 G/DL (ref 12–16)
IMM GRANULOCYTES # BLD AUTO: 0.04 K/UL (ref 0–0.11)
IMM GRANULOCYTES NFR BLD AUTO: 0.5 % (ref 0–0.9)
LYMPHOCYTES # BLD AUTO: 1.41 K/UL (ref 1–4.8)
LYMPHOCYTES NFR BLD: 17.7 % (ref 22–41)
MCH RBC QN AUTO: 26.1 PG (ref 27–33)
MCHC RBC AUTO-ENTMCNC: 30.4 G/DL (ref 33.6–35)
MCV RBC AUTO: 85.8 FL (ref 81.4–97.8)
MONOCYTES # BLD AUTO: 0.8 K/UL (ref 0–0.85)
MONOCYTES NFR BLD AUTO: 10.1 % (ref 0–13.4)
NEUTROPHILS # BLD AUTO: 5.28 K/UL (ref 2–7.15)
NEUTROPHILS NFR BLD: 66.4 % (ref 44–72)
NRBC # BLD AUTO: 0 K/UL
NRBC BLD-RTO: 0 /100 WBC
PLATELET # BLD AUTO: 295 K/UL (ref 164–446)
PMV BLD AUTO: 9.7 FL (ref 9–12.9)
POTASSIUM SERPL-SCNC: 3.9 MMOL/L (ref 3.6–5.5)
PROT SERPL-MCNC: 6.9 G/DL (ref 6–8.2)
RBC # BLD AUTO: 4.29 M/UL (ref 4.2–5.4)
SODIUM SERPL-SCNC: 137 MMOL/L (ref 135–145)
WBC # BLD AUTO: 8 K/UL (ref 4.8–10.8)

## 2021-04-05 PROCEDURE — 85025 COMPLETE CBC W/AUTO DIFF WBC: CPT

## 2021-04-05 PROCEDURE — A9576 INJ PROHANCE MULTIPACK: HCPCS | Performed by: SURGERY

## 2021-04-05 PROCEDURE — 700117 HCHG RX CONTRAST REV CODE 255: Performed by: SURGERY

## 2021-04-05 PROCEDURE — 74183 MRI ABD W/O CNTR FLWD CNTR: CPT | Mod: ME

## 2021-04-05 PROCEDURE — A4212 NON CORING NEEDLE OR STYLET: HCPCS

## 2021-04-05 PROCEDURE — 99214 OFFICE O/P EST MOD 30 MIN: CPT | Performed by: INTERNAL MEDICINE

## 2021-04-05 PROCEDURE — 82378 CARCINOEMBRYONIC ANTIGEN: CPT

## 2021-04-05 PROCEDURE — 36591 DRAW BLOOD OFF VENOUS DEVICE: CPT

## 2021-04-05 PROCEDURE — 80053 COMPREHEN METABOLIC PANEL: CPT

## 2021-04-05 PROCEDURE — 700111 HCHG RX REV CODE 636 W/ 250 OVERRIDE (IP): Performed by: NURSE PRACTITIONER

## 2021-04-05 RX ORDER — HEPARIN SODIUM (PORCINE) LOCK FLUSH IV SOLN 100 UNIT/ML 100 UNIT/ML
500 SOLUTION INTRAVENOUS PRN
Status: DISCONTINUED | OUTPATIENT
Start: 2021-04-05 | End: 2021-04-05 | Stop reason: HOSPADM

## 2021-04-05 RX ADMIN — GADOTERIDOL 15 ML: 279.3 INJECTION, SOLUTION INTRAVENOUS at 14:19

## 2021-04-05 RX ADMIN — HEPARIN 500 UNITS: 100 SYRINGE at 14:28

## 2021-04-05 ASSESSMENT — FIBROSIS 4 INDEX
FIB4 SCORE: 1.355932203389830508
FIB4 SCORE: 1.79

## 2021-04-05 ASSESSMENT — PAIN SCALES - GENERAL: PAINLEVEL: NO PAIN

## 2021-04-05 NOTE — PROGRESS NOTES
Subjective:   4/6/2021 11:22 AM  Primary care physician:Manan Quiñonez M.D.  Referring Provider:  Eric Hardy M.D.  Medical Oncologist: Eric Hardy M.D    Chief Complaint:   Chief Complaint   Patient presents with   • Follow-Up     FV MRI RECTAL CA W/LIVER METS      Diagnosis:   1. Secondary malignant neoplasm of liver (HCC)     2. Malignant neoplasm of rectum (HCC)     3. Liver disease         History of presenting illness:  Karlene Walters  is a pleasant 80 y.o. year old female who presented with follow-up status post a 3T MRI of the liver.  The patient has had an area of ablation and resection that shows unusual arterial enhancement and it looked as though this was a AV malformation created because of the ablation cavity.  Unfortunately we are having difficulty understanding if there was a recurrence or this was just perfusion defect.  She underwent a 3T MRI which I personally reviewed on April 5, 2020 and it does show that this is a perfusion defect.  There are no new tumors in the liver.  She is disease-free in her abdomen.  She does have a 17 mm costophrenic angle mass which most likely is a lymph node that has been stable and did not light up on her PET scan from May 2020.  She has not had a recent PET scan.  She is in good spirits.  She continues with chemotherapy.  She denies any fever or chills, nausea or vomiting.  I have also spoken to the radiologist regarding the MRI reading and he agreed with the above findings.      Past Medical History:   Diagnosis Date   • Adverse effect of anesthesia     elevated BP postop   • Anesthesia    • Arrhythmia     occasional   • Blood clotting disorder (HCC) 08/2015    bilat PE    • Blood transfusion 1957   • Breath shortness     pt reports d/t chemo treatment; no O2; with moderate exertion; no c/o at this time   • Cancer (HCC) 09/2012    rectal cancer,  Liver CA-2015   • CATARACT     removed b/l   • Chemotherapy-induced neutropenia (HCC) 9/28/2016   • Chickenpox      history of   • Colon cancer (HCC)    • Dental disorder     dentures UPPERS AND LOWERS   • Elevated alkaline phosphatase level 11/15/2017   • Emphysema of lung (HCC)     COPD   • Fall    • [place] measles     history of   • Heart murmur     as a child   • Hemorrhagic disorder (HCC)     on Xarelto    • High cholesterol    • Hypercholesteremia    • Hypertension     no meds currently    • Ileostomy in place (HCC)    • Ileostomy in place (HCC)    • Indigestion    • Influenza     history of   • Lightheadedness 9/17/2015   • Mumps     history of   • Obstruction     ileostomy   • Other specified symptom associated with female genital organs     HYSTERECTOMY 1993   • Pneumonia 05/2017    tx'd with antibx   • Pulmonary embolism (HCC)    • 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)    • Renal insufficiency 3/14/2016   • Restless legs 5/6/2020   • Routine general medical examination at a health care facility 3/14/2016    3/14/16   • Seasonal allergies    • Swelling of both ankles     Lasix PRN   • Tonsillitis      Past Surgical History:   Procedure Laterality Date   • HEPATIC ABLATION  5/28/2020    Procedure: ABLATION, LESION, LIVER-TRISEGMENTECTOMY, MICROWAVE ABLATION, INTRA OPERATIVE ULTRA SOUND, SIMPLE RESECTED / REPAIR OF DIAPHRAGM;  Surgeon: Kit West M.D.;  Location: SURGERY [facility];  Service: General   • HEPATIC ABLATION LAPAROSCOPIC  11/15/2018    Procedure: HEPATIC ABLATION LAPAROSCOPIC.- SEGMENT 7/8;  Surgeon: Kit West M.D.;  Location: SURGERY [facility];  Service: General   • COLONOSCOPY WITH BIOPSY  8/23/2015    Procedure: COLONOSCOPY WITH BIOPSY;  Surgeon: Wilbur Glasgow M.D.;  Location: ENDOSCOPY [facility];  Service:    • HEPATIC ABLATION LAPAROSCOPIC  7/6/2015    Procedure: HEPATIC ABLATION LAPAROSCOPIC.;  Surgeon: Kit West M.D.;  Location: SURGERY [facility];  Service:    • CATH PLACEMENT Left 7/6/2015    Pro[facility];  Surgeon: Kit West M.D.;  Location: [facility];  Service:    • FISTULA IN ANO REPAIR  1/28/2015    Performed by Kolton Montgomery M.D. at SURGERY [facility]   • PERINEAL PROCEDURE  1/28/2015    Performed by Kolton Montgomery M.D. at [facility]   • FISTULA IN ANO REPAIR  10/15/2014    Performed by Kolton Montgomery M.D. at SURGERY [facility]   • PERINEAL PROCEDURE  10/15/2014    Performed by Kolton Montgomery M.D. at SURGERY [facility]   • IRRIGATION & DEBRIDEMENT GENERAL  2/19/2014    Performed by Kolton Montgomery M.D. at SURGERY [facility]   • FLAP GRAFT  2/19/2014    Performed by Kolton Montgomery M.D. at [facility]   • PERINEAL PROCEDURE  12/18/2013    Performed by Kolton Montgomery M.D. at SURGERY [facility]   • MYOCUTANEOUS FLAP  12/18/2013    Performed by Kolton Montgomery M.D. at SURGERY [facility]   • IRRIGATION & DEBRIDEMENT GENERAL  10/23/2013    Performed by Kolton Montgomery M.D. at SURGERY [facility]   • FISTULA IN ANO REPAIR  9/4/2013    Performed by oKlton Montgomery M.D. at [facility]   • FLAP ROTATION  9/4/2013    Performed by Kolton Montgomery M.D. at SURGERY [facility]   • RECOVERY  8/26/2013    Performed by Cath-Recovery Surgery at SURGERY SAME DAY [facility]   • BIOPSY GENERAL  7/17/2013    Performed by Kolton Montgomery M.D. at SURGERY [facility]   • PROCTOSCOPY  7/17/2013    Performed by Kolton Montgomery M.D. at SURGERY [facility]   • FISTULA IN ANO REPAIR  2/27/2013    Performed by Kolton Montgomery M.D. at [facility]   • SIGMOIDOSCOPY  2/27/2013    Performed by Kolton Montgomery M.D. at SURGERY [facility]   • LAPAROSCOPY  10/8/2012    Performed by Kolton Montgomery M.D. at [facility]   • ILEO LOOP DIVERSION  10/8/2012    Performed by Kolton Montgomery M.D. at SURGERY [facility]   • COLECTOMY  9/26/2012    Performed by Kolton Montgomery M.D. at SURGERY [facility]   • COLONOSCOPY FLEX  "W/ENDO US  9/20/2012    Performed by Harshil Bolton M.D. at SURGERY [facility]   • OTHER  2009    left eye torn retina repair   • CATARACT EXTRACTION WITH IOL  2004    bilateral   • ABDOMINAL HYSTERECTOMY TOTAL  1983   • CYST EXCISION  1972    ovarian   • COLON RESECTION     • EYE SURGERY      cataracts   • HYSTERECTOMY LAPAROSCOPY     • PB REMV 2ND CATARACT,CORN-SCLER SECTN     • TONSILLECTOMY       Allergies   Allergen Reactions   • Oxaliplatin Anaphylaxis   • Codeine      \"gets drunk\"   • Pcn [Penicillins] Itching     itching   • Sulfa Drugs Itching     itching   • Tape Rash     PAPER TAPE OK     Outpatient Encounter Medications as of 4/6/2021   Medication Sig Dispense Refill   • albuterol 108 (90 Base) MCG/ACT Aero Soln inhalation aerosol Inhale 2 Puffs every 6 hours as needed for Shortness of Breath. 1 Each 11   • rivaroxaban (XARELTO) 10 MG Tab tablet Take 1 Tab by mouth every day. 30 Tab 5   • Tiotropium Bromide-Olodaterol (STIOLTO RESPIMAT) 2.5-2.5 MCG/ACT Aero Soln Take 2 Puffs by mouth every day. (Patient not taking: Reported on 3/23/2021) 2 Each 0   • furosemide (LASIX) 40 MG Tab      • potassium chloride ER (KLOR-CON) 10 MEQ tablet Take 1 Tab by mouth every day. 100 Tab 3   • ondansetron (ZOFRAN) 4 MG Tab tablet Take 1 Tab by mouth every four hours as needed. (Patient not taking: Reported on 4/5/2021) 30 Tab 5   • lidocaine-prilocaine (EMLA) 2.5-2.5 % Cream Apply to port 1 hour prior to access of port and cover with plastic wrap. 30 g 3   • Tiotropium Bromide-Olodaterol (STIOLTO RESPIMAT) 2.5-2.5 MCG/ACT Aero Soln Take 2 Puffs by mouth every day. 1 Inhaler 11   • metronidazole (METROCREAM) 0.75 % cream Apply 1 Each to affected area(s) 2 times a day.     • cyanocobalamin (VITAMIN B-12) 500 MCG Tab Take 1,000 mcg by mouth every day.     • Multiple Vitamins-Minerals (CENTRUM SILVER PO) Take 1 Tab by mouth every day.     • Cholecalciferol (VITAMIN D3) 400 UNITS CAPS Take 1 Cap by mouth every day.   "   • loratadine (CLARITIN) 10 MG TABS Take 10 mg by mouth every day. Indications: Hayfever     • [DISCONTINUED] fluorouracil (ADRUCIL) 3,435 mg in CADD Cassette/Bag Chemo infusion (for use in CADD PUMP)        No facility-administered encounter medications on file as of 4/6/2021.     Social History     Socioeconomic History   • Marital status:      Spouse name: Not on file   • Number of children: Not on file   • Years of education: Not on file   • Highest education level: Not on file   Occupational History   • Not on file   Tobacco Use   • Smoking status: Never Smoker   • Smokeless tobacco: Never Used   Substance and Sexual Activity   • Alcohol use: No   • Drug use: No   • Sexual activity: Never     Partners: Male     Birth control/protection: Post-Menopausal   Other Topics Concern   • Primary/coprimary nurse & associates Not Asked   • Family contact information Not Asked   • OK to release patient information to the following Not Asked   • Patient preferred routine/Privacy concerns Not Asked   • Patient likes and dislikes Not Asked   • Participating In Research Study Not Asked   • Miscellaneous Not Asked   Social History Narrative   • Not on file     Social Determinants of Health     Financial Resource Strain: Low Risk    • Difficulty of Paying Living Expenses: Not hard at all   Food Insecurity: No Food Insecurity   • Worried About Running Out of Food in the Last Year: Never true   • Ran Out of Food in the Last Year: Never true   Transportation Needs: No Transportation Needs   • Lack of Transportation (Medical): No   • Lack of Transportation (Non-Medical): No   Physical Activity:    • Days of Exercise per Week:    • Minutes of Exercise per Session:    Stress:    • Feeling of Stress :    Social Connections:    • Frequency of Communication with Friends and Family:    • Frequency of Social Gatherings with Friends and Family:    • Attends Yarsanism Services:    • Active Member of Clubs or Organizations:    •  "Attends Club or Organization Meetings:    • Marital Status:    Intimate Partner Violence:    • Fear of Current or Ex-Partner:    • Emotionally Abused:    • Physically Abused:    • Sexually Abused:       Social History     Tobacco Use   Smoking Status Never Smoker   Smokeless Tobacco Never Used     Social History     Substance and Sexual Activity   Alcohol Use No     Social History     Substance and Sexual Activity   Drug Use No        Family History   Problem Relation Age of Onset   • Heart Disease Mother    • Heart Failure Mother    • Hypertension Mother    • Hyperlipidemia Mother    • Cancer Mother    • Cancer Maternal Aunt 60        breast ca   • Lung Disease Brother         COPD/Emphysema/Smoker   • Cancer Brother         prostate cancer   • Alcohol/Drug Brother         Alcohol   • Kidney Disease Father         renal failure       Review of Systems   All other systems reviewed and are negative.       Objective:   /62 (BP Location: Right arm, Patient Position: Sitting, BP Cuff Size: Adult)   Pulse (!) 102   Temp 36.4 °C (97.6 °F) (Temporal)   Ht 1.676 m (5' 6\")   Wt 70.8 kg (156 lb)   LMP  (LMP Unknown)   SpO2 99%   BMI 25.18 kg/m²     Physical Exam   Constitutional: She is oriented to person, place, and time. She appears well-developed and well-nourished.   HENT:   Head: Normocephalic and atraumatic.   Eyes: Pupils are equal, round, and reactive to light. Conjunctivae are normal.   Cardiovascular: Normal rate and regular rhythm.   Pulmonary/Chest: Effort normal.   Abdominal: Soft. Bowel sounds are normal.   Musculoskeletal:      Cervical back: Normal range of motion and neck supple.   Neurological: She is alert and oriented to person, place, and time.   Skin: Skin is warm and dry.   Psychiatric: She has a normal mood and affect. Her behavior is normal. Judgment and thought content normal.   Nursing note and vitals reviewed.    In light of the present findings, the liver appears perfectly clean.  As " for the 17 mm cardiophrenic angle  Labs:  Results for KANG HALL (MRN [id number]) as of 4/5/2021 08:29   Ref. Range 3/23/2021 07:15   WBC Latest Ref Range: 4.8 - 10.8 K/uL 8.1   RBC Latest Ref Range: 4.20 - 5.40 M/uL 4.54   Hemoglobin Latest Ref Range: 12.0 - 16.0 g/dL 11.7 (L)   Hematocrit Latest Ref Range: 37.0 - 47.0 % 38.8   MCV Latest Ref Range: 81.4 - 97.8 fL 85.5   MCH Latest Ref Range: 27.0 - 33.0 pg 25.8 (L)   MCHC Latest Ref Range: 33.6 - 35.0 g/dL 30.2 (L)   RDW Latest Ref Range: 35.9 - 50.0 fL 55.5 (H)   Platelet Count Latest Ref Range: 164 - 446 K/uL 363   MPV Latest Ref Range: 9.0 - 12.9 fL 10.0   Neutrophils-Polys Latest Ref Range: 44.00 - 72.00 % 62.00   Neutrophils (Absolute) Latest Ref Range: 2.00 - 7.15 K/uL 5.00   Lymphocytes Latest Ref Range: 22.00 - 41.00 % 21.00 (L)   Lymphs (Absolute) Latest Ref Range: 1.00 - 4.80 K/uL 1.69   Monocytes Latest Ref Range: 0.00 - 13.40 % 11.90   Monos (Absolute) Latest Ref Range: 0.00 - 0.85 K/uL 0.96 (H)   Eosinophils Latest Ref Range: 0.00 - 6.90 % 2.60   Eos (Absolute) Latest Ref Range: 0.00 - 0.51 K/uL 0.21   Basophils Latest Ref Range: 0.00 - 1.80 % 1.50   Baso (Absolute) Latest Ref Range: 0.00 - 0.12 K/uL 0.12   Immature Granulocytes Latest Ref Range: 0.00 - 0.90 % 1.00 (H)   Immature Granulocytes (abs) Latest Ref Range: 0.00 - 0.11 K/uL 0.08   Nucleated RBC Latest Units: /100 WBC 0.00   NRBC (Absolute) Latest Units: K/uL 0.00   Sodium Latest Ref Range: 135 - 145 mmol/L 133 (L)   Potassium Latest Ref Range: 3.6 - 5.5 mmol/L 3.8   Chloride Latest Ref Range: 96 - 112 mmol/L 99   Co2 Latest Ref Range: 20 - 33 mmol/L 22   Anion Gap Latest Ref Range: 7.0 - 16.0  12.0   Glucose Latest Ref Range: 65 - 99 mg/dL 101 (H)   Bun Latest Ref Range: 8 - 22 mg/dL 17   Creatinine Latest Ref Range: 0.50 - 1.40 mg/dL 1.07   GFR If  Latest Ref Range: >60 mL/min/1.73 m 2 60   GFR If Non  Latest Ref Range: >60 mL/min/1.73 m 2 49 (A)    Calcium Latest Ref Range: 8.5 - 10.5 mg/dL 9.3   AST(SGOT) Latest Ref Range: 12 - 45 U/L 43   ALT(SGPT) Latest Ref Range: 2 - 50 U/L 28   Alkaline Phosphatase Latest Ref Range: 30 - 99 U/L 333 (H)   Total Bilirubin Latest Ref Range: 0.1 - 1.5 mg/dL 0.5   Albumin Latest Ref Range: 3.2 - 4.9 g/dL 3.5   Total Protein Latest Ref Range: 6.0 - 8.2 g/dL 7.3   Globulin Latest Ref Range: 1.9 - 3.5 g/dL 3.8 (H)   A-G Ratio Latest Units: g/dL 0.9   Carcinoembryonic Antigen Latest Ref Range: 0.0 - 3.0 ng/mL 1.4       Imagin21 MRI   Per my read,     MPRESSION:     1.  Stable appearance of the liver with distortion of the RIGHT lobe consistent with prior partial hepatectomy and ablation.  No evidence to indicate recurrent tumor.  2.  Enhancing soft tissue nodule in the RIGHT cardiophrenic angle likely again seen and unchanged, consistent with adenopathy.          3/9/21 CT CHEST     IMPRESSION:        1. No consolidation to suggest pneumonia.     2. There is a 1.7 x 2.6 cm nodular opacity in the right lung base of crossing from the liver lesion. This opacity has changed in size with the ablation, previously measured 1.6 x 2.5 cm in 2020 and 0.8 x 1.0 cm in 10/5/2020. This is suspected to be   burned area adjacent to ablation. Continued follow-up is recommended.     3. Partially visualized ablation cavity in the hepatic dome measuring 4.4 x 6.3 cm, previously 4.3 x 6.8 cm      Pathology: 20     FINAL DIAGNOSIS:     A. Falciform ligament:          Portion of benign fibrovascular adipose tissue.          No malignancy identified.      Procedures: 20     1. Exploratory laparotomy.  2. Open microwave thermal ablation x10 involving 2 separate lesions in the   right lobe and medial segment of the left lobe.  This was done with 6   overlapping 100 watt at 2-minute ablation giving us 6 overlapping 3.2x4.0 cm   ablation cavities.  The second ablation was essentially right at the origin of   the right portal pedicle  approximately 2 cm away from the left portal   pedicle, which most of her liver function is from that left lobe.  She went   over a single ablation of 100 watt at 2 minutes giving us a 3x3.4 cm ablation   of a 2 cm tumor.  The rest of her liver was clean.  We were confident that we   were away enough from the hilum and the left portal pedicle, but we were   unable to do the right lobe resection due to involvement of the diaphragm,   which we had to resect and repair as well as the main right portal pedicle   near the hilum, we felt that we were going to risk injuring her left lobe   pedicle.  3. Resection and simple repair of right hemidiaphragm.       Diagnosis:     1. Secondary malignant neoplasm of liver (HCC)     2. Malignant neoplasm of rectum (HCC)     3. Liver disease             Medical Decision Making:  Today's Assessment / Status / Plan:   In light of the present findings, the patient's liver appears to be completely clean as we predicted.  As for that 17 mm cardiophrenic angle lymph node, it appears to be stable and looking back at her last PET scan in May 2020 did not light up.  My recommendation is to continue with surveillance of the liver with an MRI on a regular MRI in 6 months.  As for the lungs, she is being followed by pulmonology and if this mass does grow in the cardiophrenic angle then I would recommend possibly an endoscopic ultrasound biopsy or interventional radiology biopsy and SBRT.  In the meantime she will follow up in 6 months.    I, Dr. West have entered, reviewed and confirmed the above diagnoses related to this patient on this date of service, 4/6/2021 11:22 AM.    She agreed and verbalized her agreement and understanding with the current plan. I answered all questions and concerns she has at this time and advised her to call at any time in the interim with questions or concerns in regards to her care.    Thank you for allowing me to participate in her care, I will continue  to follow closely.       Please note that this dictation was created using voice recognition software. I have made every reasonable attempt to correct obvious errors, but I expect that there are errors of grammar and possibly content that I did not discover before finalizing the note.     Thank you for this consultation and allowing me to participate in your patient's care. If I can be of further service please contact my office.

## 2021-04-05 NOTE — PROGRESS NOTES
"Pharmacy Chemotherapy Verification  Patient Name: Karlene Walters      Dx: Colon Ca      Protocol: 5-FU + Leucovorin     Leucovorin 400 mg/m² IV over 2 hours on Day 1, followed by  Fluorouracil 400 mg/m²  IVP on Day 1, followed by                --7/5/16: Dose reduced by 20% to 320 mg/m² starting with Cycle 18 ([facility]ee #11)  due to neutropenia                -- 10/31/17: delete Leucovorin and 5-FU push d/t neutropenia per Dr. Hardy   Fluorouracil 2400 mg/m² continuous infusion over 46-48 hours                --7/5/16: Infusion Dose reduced by 20% starting with Cycle 18 ([facility] #11)  due to neutropenia               --20% reduction (1920 mg/m²) to be continued starting C28 per C Alsop APRN    14 day cycles for 12 cycles (adjuvant) or until disease progression or unacceptable toxicity  *Dosing Reference*     NCCN Guidelines for Colon Cancer. V.2.2015.  Jay T, et al. Eur J Cancer.1999;35(9):1343-7.     Allergies:  Oxaliplatin; Codeine; Pcn [penicillins]; Sulfa drugs; and Tape     /60   Pulse 62   Temp 36.3 °C (97.4 °F) (Temporal)   Resp 18   Ht 1.62 m (5' 3.78\")   Wt 71.6 kg (157 lb 13.6 oz)   LMP  (LMP Unknown)   SpO2 100%   BMI 27.28 kg/m²  Body surface area is 1.79 meters squared.      Labs from 4/5/21 reviewed- results within treatment parameters        Drug Order   (Drug name, dose, route, IV Fluid & volume, frequency, number of doses) Cycle 127 ([facility]ee cycle 119)    Previous treatment: 3/23/21     Medication = Fluorouracil (5-FU)  Base Dose = 1920 mg/m²  Calc Dose: Base Dose x 1.79 m² = 3437 mg  Final Dose = 3435 mg   Route = CIVI via CADD pump  Fluid & Volume = 68.7 mL (+ 3 mL overfill = 71.7 mL)  Admin Duration = Over 46 hours @ 1.5 mL/hour   Via CADD pump for home infusion      <10% difference, ok to treat with final written dose     By my signature below, I confirm this process was performed independently with the BSA and all final chemotherapy dosing calculations congruent. I have reviewed " the above chemotherapy order and that my calculation of the final dose and BSA (when applicable) corroborate those calculations of the  pharmacist. Discrepancies of 10% or greater in the written dose have been addressed and documented within the EPIC Progress notes.    Norris Mitchell, PharmD

## 2021-04-05 NOTE — PROGRESS NOTES
Patient arrived ambulatory to IS for Port access prior to MRI and pre chemo labs.  Port accessed using sterile technique, power needle inserted.  Brisk blood return noted.  Labs drawn per order.  Pt tolerated well.  Port flushed and left accessed for MRI.    1425:  Pt return to IS for heparin lock of port post MRI.  Port flushes well with brisk blood return noted.  Heparin instilled per protocol.  Port left accessed for chemotherapy tomorrow.  Confirmed appointment time and she ambulated out of clinic in no apparent distress.

## 2021-04-06 ENCOUNTER — OFFICE VISIT (OUTPATIENT)
Dept: SURGICAL ONCOLOGY | Facility: MEDICAL CENTER | Age: 81
End: 2021-04-06
Payer: MEDICARE

## 2021-04-06 ENCOUNTER — APPOINTMENT (OUTPATIENT)
Dept: ONCOLOGY | Facility: MEDICAL CENTER | Age: 81
End: 2021-04-06
Attending: INTERNAL MEDICINE
Payer: MEDICARE

## 2021-04-06 ENCOUNTER — TELEPHONE (OUTPATIENT)
Dept: SLEEP MEDICINE | Facility: MEDICAL CENTER | Age: 81
End: 2021-04-06

## 2021-04-06 VITALS
WEIGHT: 156 LBS | SYSTOLIC BLOOD PRESSURE: 128 MMHG | TEMPERATURE: 97.6 F | HEART RATE: 102 BPM | OXYGEN SATURATION: 99 % | HEIGHT: 66 IN | DIASTOLIC BLOOD PRESSURE: 62 MMHG | BODY MASS INDEX: 25.07 KG/M2

## 2021-04-06 VITALS
BODY MASS INDEX: 26.95 KG/M2 | WEIGHT: 157.85 LBS | HEIGHT: 64 IN | HEART RATE: 62 BPM | RESPIRATION RATE: 18 BRPM | OXYGEN SATURATION: 100 % | TEMPERATURE: 97.4 F | SYSTOLIC BLOOD PRESSURE: 151 MMHG | DIASTOLIC BLOOD PRESSURE: 60 MMHG

## 2021-04-06 DIAGNOSIS — C20 MALIGNANT NEOPLASM OF RECTUM (HCC): ICD-10-CM

## 2021-04-06 DIAGNOSIS — K76.9 LIVER DISEASE: ICD-10-CM

## 2021-04-06 DIAGNOSIS — C78.7 SECONDARY MALIGNANT NEOPLASM OF LIVER (HCC): ICD-10-CM

## 2021-04-06 DIAGNOSIS — R94.4 DECREASED GFR: ICD-10-CM

## 2021-04-06 PROCEDURE — 700105 HCHG RX REV CODE 258: Performed by: NURSE PRACTITIONER

## 2021-04-06 PROCEDURE — 96361 HYDRATE IV INFUSION ADD-ON: CPT

## 2021-04-06 PROCEDURE — G0498 CHEMO EXTEND IV INFUS W/PUMP: HCPCS

## 2021-04-06 PROCEDURE — 700111 HCHG RX REV CODE 636 W/ 250 OVERRIDE (IP): Performed by: INTERNAL MEDICINE

## 2021-04-06 PROCEDURE — 96374 THER/PROPH/DIAG INJ IV PUSH: CPT | Mod: XU

## 2021-04-06 PROCEDURE — 99215 OFFICE O/P EST HI 40 MIN: CPT | Performed by: SURGERY

## 2021-04-06 RX ORDER — SODIUM CHLORIDE 9 MG/ML
500 INJECTION, SOLUTION INTRAVENOUS ONCE
Status: COMPLETED | OUTPATIENT
Start: 2021-04-06 | End: 2021-04-06

## 2021-04-06 RX ORDER — DEXAMETHASONE SODIUM PHOSPHATE 4 MG/ML
8 INJECTION, SOLUTION INTRA-ARTICULAR; INTRALESIONAL; INTRAMUSCULAR; INTRAVENOUS; SOFT TISSUE ONCE
Status: COMPLETED | OUTPATIENT
Start: 2021-04-06 | End: 2021-04-06

## 2021-04-06 RX ADMIN — SODIUM CHLORIDE 500 ML: 9 INJECTION, SOLUTION INTRAVENOUS at 08:07

## 2021-04-06 RX ADMIN — DEXAMETHASONE SODIUM PHOSPHATE 8 MG: 4 INJECTION, SOLUTION INTRA-ARTICULAR; INTRALESIONAL; INTRAMUSCULAR; INTRAVENOUS; SOFT TISSUE at 08:26

## 2021-04-06 RX ADMIN — FLUOROURACIL 3435 MG: 50 INJECTION, SOLUTION INTRAVENOUS at 09:13

## 2021-04-06 ASSESSMENT — FIBROSIS 4 INDEX
FIB4 SCORE: 1.355932203389830508
FIB4 SCORE: 1.355932203389830508

## 2021-04-06 NOTE — PROGRESS NOTES
Chemotherapy Verification - PRIMARY RN      Height = 162 cm  Weight = 71.6 kg  BSA = 1.79 m^2       Medication: fluorouracil  Dose: 1920 mg/m^2 over 46 hours Calculated Dose: 3,436.8 mg                             (In mg/m2, AUC, mg/kg)           I confirm this process was performed independently with the BSA and all final chemotherapy dosing calculations congruent.  Any discrepancies of 10% or greater have been addressed with the chemotherapy pharmacist. The resolution of the discrepancy has been documented in the EPIC progress notes.

## 2021-04-06 NOTE — PROGRESS NOTES
Chemotherapy Verification - SECONDARY RN       Height = 162 cm Weight = 71.6 kg  BSA = 1.79 m2       Medication: home infusion 5FU  Dose: 1920 mg/m2 dr  Calculated Dose: 3446.3 mg                             (In mg/m2, AUC, mg/kg)       I confirm that this process was performed independently.

## 2021-04-06 NOTE — TELEPHONE ENCOUNTER
Patient stop by she wants to know for her apt in May with Asha Malone order a CT and she got one done on March 9,2021 she wants to know if that one is ok. Thank you  [phone]

## 2021-04-06 NOTE — PROGRESS NOTES
Karlene into [facility] for Day 1/ Cycle 119 (per beacon) of hydration and 5FU CADD pump. Port accessed prior, had + blood return, flushed briskly. Pt given hydration and dexamethasone as prescribed, tolerated well, denied having any complaints during or after infusion. Port then flushed with brisk blood return observed prior to attaching 5FU CADD pump. 5FU CADD pump attached and infusing without issue, all clamps left unclamped. Karlene denies having further needs at this time, Kalrene to return 4/8/21 for de-access. Karlene discharged home to self care.

## 2021-04-06 NOTE — TELEPHONE ENCOUNTER
This CT should be fine; will review at follow up and patient discussed with radiology oncology today. Oncology Aslop APRN also aware and due for f/u in 2 weeks.

## 2021-04-06 NOTE — PROGRESS NOTES
"Pharmacy Chemotherapy Calculations Note:    Patient Name: Karlene Walters        Dx: Colon CA  Cycle:  127 ([place] 119) Previous treatment: C126 ([place] [facility] 118)     Protocol:  5-FU + Leucovorin     Leucovorin 400 mg/m² IV over 2 hours on Day 1, followed by  Fluorouracil 400 mg/m²  IVP on Day 1, followed by                --7/5/16: Dose reduced by 20% to 320 mg/m² starting with Cycle 18 ([place] [facility] #11)  due to neutropenia                -- 10/31/17: delete Leucovorin and 5-FU push d/t neutropenia per Dr. Hardy   Fluorouracil 2400 mg/m² continuous infusion over 46-48 hours                --7/5/16: Infusion Dose reduced by 20% starting with Cycle 18 ([place] [facility] #11)  due to neutropenia               --20% reduction (1920 mg/m²) to be continued starting C28 per LLOYD Alsop APRN               14 day cycles for 12 cycles (adjuvant) or until disease progression or unacceptable toxicity    NCCN Guidelines for Colon Cancer. V.2.2015.  Jay T, et al. Eur J Cancer.1999;35(9):1343-7.          /60   Pulse 62   Temp 36.3 °C (97.4 °F) (Temporal)   Resp 18   Ht 1.62 m (5' 3.78\")   Wt 71.6 kg (157 lb 13.6 oz)   LMP  (LMP Unknown)   SpO2 100%   BMI 27.28 kg/m²  Body surface area is 1.79 meters squared.   Labs from 4/5/21:  ANC~ 5280  Plt = 295 k    SCr = 0.84 mg/dL CrCl = 59 mL/min  LFT's = WNL  TBili = 0.5  MRI 4/5/21: no evidence of recurrent tumor     Fluorouracil (5-FU) 1920 mg/m² x 1.79 m² = 3437 mg   <10% difference, ok to treat with final written dose = 3435 mg CIVI via CADD pump    Rudy Arthur, PharmD, PharmD, BCPS             "

## 2021-04-08 ENCOUNTER — OUTPATIENT INFUSION SERVICES (OUTPATIENT)
Dept: ONCOLOGY | Facility: MEDICAL CENTER | Age: 81
End: 2021-04-08
Attending: INTERNAL MEDICINE
Payer: MEDICARE

## 2021-04-08 VITALS
HEART RATE: 66 BPM | OXYGEN SATURATION: 100 % | BODY MASS INDEX: 27.48 KG/M2 | RESPIRATION RATE: 18 BRPM | HEIGHT: 64 IN | DIASTOLIC BLOOD PRESSURE: 60 MMHG | WEIGHT: 160.94 LBS | SYSTOLIC BLOOD PRESSURE: 151 MMHG | TEMPERATURE: 97 F

## 2021-04-08 DIAGNOSIS — C20 MALIGNANT NEOPLASM OF RECTUM (HCC): ICD-10-CM

## 2021-04-08 DIAGNOSIS — R94.4 DECREASED GFR: ICD-10-CM

## 2021-04-08 PROCEDURE — 96360 HYDRATION IV INFUSION INIT: CPT

## 2021-04-08 PROCEDURE — 700105 HCHG RX REV CODE 258: Performed by: NURSE PRACTITIONER

## 2021-04-08 PROCEDURE — 700111 HCHG RX REV CODE 636 W/ 250 OVERRIDE (IP): Performed by: INTERNAL MEDICINE

## 2021-04-08 RX ORDER — HEPARIN SODIUM (PORCINE) LOCK FLUSH IV SOLN 100 UNIT/ML 100 UNIT/ML
500 SOLUTION INTRAVENOUS PRN
Status: DISCONTINUED | OUTPATIENT
Start: 2021-04-08 | End: 2021-04-08 | Stop reason: HOSPADM

## 2021-04-08 RX ORDER — SODIUM CHLORIDE 9 MG/ML
500 INJECTION, SOLUTION INTRAVENOUS ONCE
Status: COMPLETED | OUTPATIENT
Start: 2021-04-08 | End: 2021-04-08

## 2021-04-08 RX ADMIN — SODIUM CHLORIDE 500 ML: 9 INJECTION, SOLUTION INTRAVENOUS at 11:35

## 2021-04-08 RX ADMIN — HEPARIN 500 UNITS: 100 SYRINGE at 12:40

## 2021-04-08 ASSESSMENT — FIBROSIS 4 INDEX: FIB4 SCORE: 1.355932203389830508

## 2021-04-08 NOTE — PROGRESS NOTES
Here for 5 FU pump disconnect after 46 hrs of continuous infusion and Hydration. See chemotherapy flow sheet for volume / dose administration. Hydration well tolerated. Port flushed per protocol, site d-accessed, needle intact compression dressing applied. Patient discharge home to self care in [facility]. Next appointment confirmed.

## 2021-04-20 ENCOUNTER — OUTPATIENT INFUSION SERVICES (OUTPATIENT)
Dept: ONCOLOGY | Facility: MEDICAL CENTER | Age: 81
End: 2021-04-20
Attending: INTERNAL MEDICINE
Payer: MEDICARE

## 2021-04-20 ENCOUNTER — OFFICE VISIT (OUTPATIENT)
Dept: HEMATOLOGY ONCOLOGY | Facility: MEDICAL CENTER | Age: 81
End: 2021-04-20
Payer: MEDICARE

## 2021-04-20 VITALS
TEMPERATURE: 97.3 F | RESPIRATION RATE: 18 BRPM | HEART RATE: 102 BPM | BODY MASS INDEX: 27.29 KG/M2 | WEIGHT: 159.83 LBS | OXYGEN SATURATION: 99 % | HEIGHT: 64 IN | DIASTOLIC BLOOD PRESSURE: 55 MMHG | SYSTOLIC BLOOD PRESSURE: 138 MMHG

## 2021-04-20 VITALS
HEIGHT: 64 IN | SYSTOLIC BLOOD PRESSURE: 118 MMHG | HEART RATE: 98 BPM | TEMPERATURE: 98.1 F | RESPIRATION RATE: 16 BRPM | WEIGHT: 159.39 LBS | BODY MASS INDEX: 27.21 KG/M2 | DIASTOLIC BLOOD PRESSURE: 68 MMHG | OXYGEN SATURATION: 98 %

## 2021-04-20 VITALS
HEIGHT: 64 IN | SYSTOLIC BLOOD PRESSURE: 138 MMHG | DIASTOLIC BLOOD PRESSURE: 55 MMHG | HEART RATE: 102 BPM | BODY MASS INDEX: 27.29 KG/M2 | RESPIRATION RATE: 18 BRPM | TEMPERATURE: 97.3 F | WEIGHT: 159.83 LBS | OXYGEN SATURATION: 99 %

## 2021-04-20 DIAGNOSIS — Z86.711 HISTORY OF PULMONARY EMBOLUS (PE): ICD-10-CM

## 2021-04-20 DIAGNOSIS — R94.4 DECREASED GFR: ICD-10-CM

## 2021-04-20 DIAGNOSIS — C18.9 METASTATIC COLON CANCER IN FEMALE: ICD-10-CM

## 2021-04-20 DIAGNOSIS — R74.8 ELEVATED ALKALINE PHOSPHATASE LEVEL: ICD-10-CM

## 2021-04-20 DIAGNOSIS — Z79.01 CURRENT USE OF LONG TERM ANTICOAGULATION: ICD-10-CM

## 2021-04-20 DIAGNOSIS — C78.7 SECONDARY MALIGNANT NEOPLASM OF LIVER (HCC): ICD-10-CM

## 2021-04-20 DIAGNOSIS — C20 MALIGNANT NEOPLASM OF RECTUM (HCC): ICD-10-CM

## 2021-04-20 LAB
ALBUMIN SERPL BCP-MCNC: 3.4 G/DL (ref 3.2–4.9)
ALBUMIN/GLOB SERPL: 1.1 G/DL
ALP SERPL-CCNC: 202 U/L (ref 30–99)
ALT SERPL-CCNC: 17 U/L (ref 2–50)
ANION GAP SERPL CALC-SCNC: 10 MMOL/L (ref 7–16)
AST SERPL-CCNC: 23 U/L (ref 12–45)
BASOPHILS # BLD AUTO: 0.9 % (ref 0–1.8)
BASOPHILS # BLD: 0.07 K/UL (ref 0–0.12)
BILIRUB SERPL-MCNC: 0.6 MG/DL (ref 0.1–1.5)
BUN SERPL-MCNC: 10 MG/DL (ref 8–22)
CALCIUM SERPL-MCNC: 9.1 MG/DL (ref 8.5–10.5)
CEA SERPL-MCNC: 1.3 NG/ML (ref 0–3)
CHLORIDE SERPL-SCNC: 98 MMOL/L (ref 96–112)
CO2 SERPL-SCNC: 23 MMOL/L (ref 20–33)
CREAT SERPL-MCNC: 0.96 MG/DL (ref 0.5–1.4)
EOSINOPHIL # BLD AUTO: 0.2 K/UL (ref 0–0.51)
EOSINOPHIL NFR BLD: 2.5 % (ref 0–6.9)
ERYTHROCYTE [DISTWIDTH] IN BLOOD BY AUTOMATED COUNT: 54.2 FL (ref 35.9–50)
GLOBULIN SER CALC-MCNC: 3.2 G/DL (ref 1.9–3.5)
GLUCOSE SERPL-MCNC: 126 MG/DL (ref 65–99)
HCT VFR BLD AUTO: 35 % (ref 37–47)
HGB BLD-MCNC: 10.7 G/DL (ref 12–16)
IMM GRANULOCYTES # BLD AUTO: 0.04 K/UL (ref 0–0.11)
IMM GRANULOCYTES NFR BLD AUTO: 0.5 % (ref 0–0.9)
LYMPHOCYTES # BLD AUTO: 1 K/UL (ref 1–4.8)
LYMPHOCYTES NFR BLD: 12.3 % (ref 22–41)
MCH RBC QN AUTO: 25.8 PG (ref 27–33)
MCHC RBC AUTO-ENTMCNC: 30.6 G/DL (ref 33.6–35)
MCV RBC AUTO: 84.3 FL (ref 81.4–97.8)
MONOCYTES # BLD AUTO: 0.75 K/UL (ref 0–0.85)
MONOCYTES NFR BLD AUTO: 9.2 % (ref 0–13.4)
NEUTROPHILS # BLD AUTO: 6.06 K/UL (ref 2–7.15)
NEUTROPHILS NFR BLD: 74.6 % (ref 44–72)
NRBC # BLD AUTO: 0 K/UL
NRBC BLD-RTO: 0 /100 WBC
PLATELET # BLD AUTO: 266 K/UL (ref 164–446)
PMV BLD AUTO: 9.6 FL (ref 9–12.9)
POTASSIUM SERPL-SCNC: 3.5 MMOL/L (ref 3.6–5.5)
PROT SERPL-MCNC: 6.6 G/DL (ref 6–8.2)
RBC # BLD AUTO: 4.15 M/UL (ref 4.2–5.4)
SODIUM SERPL-SCNC: 131 MMOL/L (ref 135–145)
WBC # BLD AUTO: 8.1 K/UL (ref 4.8–10.8)

## 2021-04-20 PROCEDURE — 96361 HYDRATE IV INFUSION ADD-ON: CPT

## 2021-04-20 PROCEDURE — 80053 COMPREHEN METABOLIC PANEL: CPT

## 2021-04-20 PROCEDURE — 700105 HCHG RX REV CODE 258: Performed by: NURSE PRACTITIONER

## 2021-04-20 PROCEDURE — A4212 NON CORING NEEDLE OR STYLET: HCPCS

## 2021-04-20 PROCEDURE — G0498 CHEMO EXTEND IV INFUS W/PUMP: HCPCS

## 2021-04-20 PROCEDURE — 85025 COMPLETE CBC W/AUTO DIFF WBC: CPT

## 2021-04-20 PROCEDURE — 700111 HCHG RX REV CODE 636 W/ 250 OVERRIDE (IP): Performed by: INTERNAL MEDICINE

## 2021-04-20 PROCEDURE — 96374 THER/PROPH/DIAG INJ IV PUSH: CPT | Mod: XU

## 2021-04-20 PROCEDURE — 99214 OFFICE O/P EST MOD 30 MIN: CPT | Performed by: NURSE PRACTITIONER

## 2021-04-20 PROCEDURE — 700111 HCHG RX REV CODE 636 W/ 250 OVERRIDE (IP)

## 2021-04-20 PROCEDURE — 82378 CARCINOEMBRYONIC ANTIGEN: CPT

## 2021-04-20 RX ORDER — SODIUM CHLORIDE 9 MG/ML
500 INJECTION, SOLUTION INTRAVENOUS ONCE
Status: COMPLETED | OUTPATIENT
Start: 2021-04-20 | End: 2021-04-20

## 2021-04-20 RX ORDER — HEPARIN SODIUM (PORCINE) LOCK FLUSH IV SOLN 100 UNIT/ML 100 UNIT/ML
SOLUTION INTRAVENOUS
Status: COMPLETED
Start: 2021-04-20 | End: 2021-04-20

## 2021-04-20 RX ORDER — DEXAMETHASONE SODIUM PHOSPHATE 4 MG/ML
8 INJECTION, SOLUTION INTRA-ARTICULAR; INTRALESIONAL; INTRAMUSCULAR; INTRAVENOUS; SOFT TISSUE ONCE
Status: COMPLETED | OUTPATIENT
Start: 2021-04-20 | End: 2021-04-20

## 2021-04-20 RX ADMIN — HEPARIN 500 UNITS: 100 SYRINGE at 07:10

## 2021-04-20 RX ADMIN — FLUOROURACIL 3475 MG: 50 INJECTION, SOLUTION INTRAVENOUS at 09:09

## 2021-04-20 RX ADMIN — SODIUM CHLORIDE 500 ML: 9 INJECTION, SOLUTION INTRAVENOUS at 08:06

## 2021-04-20 RX ADMIN — DEXAMETHASONE SODIUM PHOSPHATE 8 MG: 4 INJECTION, SOLUTION INTRA-ARTICULAR; INTRALESIONAL; INTRAMUSCULAR; INTRAVENOUS; SOFT TISSUE at 08:30

## 2021-04-20 ASSESSMENT — ENCOUNTER SYMPTOMS
HEADACHES: 0
VOMITING: 0
SHORTNESS OF BREATH: 1
CHILLS: 0
FEVER: 0
WEIGHT LOSS: 0
CONSTIPATION: 0
DIZZINESS: 0
COUGH: 1
NAUSEA: 1
DIARRHEA: 0
MYALGIAS: 0

## 2021-04-20 ASSESSMENT — PAIN SCALES - GENERAL: PAINLEVEL: NO PAIN

## 2021-04-20 ASSESSMENT — FIBROSIS 4 INDEX
FIB4 SCORE: 1.52
FIB4 SCORE: 1.37
FIB4 SCORE: 1.37

## 2021-04-20 NOTE — PROGRESS NOTES
Chemotherapy Verification - PRIMARY RN      Height = 163 cm  Weight = 72.5 kg  BSA = 1.81 m^2       Medication: fluorouracil  Dose: 1920 mg/m^2 over 46 hours Calculated Dose: 3475.2 mg                             (In mg/m2, AUC, mg/kg)           I confirm this process was performed independently with the BSA and all final chemotherapy dosing calculations congruent.  Any discrepancies of 10% or greater have been addressed with the chemotherapy pharmacist. The resolution of the discrepancy has been documented in the EPIC progress notes.

## 2021-04-20 NOTE — Clinical Note
Richard Barry,   I know I spoke to you about this patient previously.  After reviewing her MRI, things do appear to be stable.  However after speaking with our medical oncologist we would like to repeat CT in 3 months, which would be mid June.  Would you be okay if I move the CT that was ordered under you to mid June?    Ella

## 2021-04-20 NOTE — PROGRESS NOTES
Subjective:      Karlene Walters is a 81 y.o. female who presents for pre-chemo for metastatic rectal carcinoma.        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  She presents unaccompanied for today's visit.  Patient is scheduled to receive cycle #128 ([place] plan #120) of single agent 5-FU which she continues on every 2-week interval.    Cancer History  Patient with a long history of rectal carcinoma initially diagnosed in 2012.  She was found to have metastatic disease to liver and lung in 2015.  She is status post liver ablation as well as has received Y 90 in the past.  She was on XELOX initially but had allergic reaction to oxaliplatin (last dose 7/28/15) and was switched to single agent 5-FU. Patient initially on 5-FU at 2400 mg/m2 with Leucovorin and 5-FU push starting on 10/27/15, followed by 20% dose reduction to 1920 mg/m2 for cycle 18 (7/5/16), followed by deletion of Leucovorin and 5-FU push on 10/31/17. In January 2020 patient was changed to every 3-week cycle at cycle #99 ([place] #91) and then changed to every 4-week interval in May 2020.  It was at that time there was noted to have growth of disease within the liver and was seen by Dr. West, surgeon.  Patient did undergo surgery in hopes of resection of the tumor but it was felt to be too close to the hilum and therefore she did undergo ablation instead on 5/28/20.  She has been back on single agent 5-FU at every 2-week interval, restarted on 6/16/20. Last CT 10/06/20 showed the tumor ablation cavities within the right lobe of the liver have decreased in size since previous examination.  There is a 1.5 x 3.1 cm focal area of enhancement adjacent to the ablation cavity that appears to have increased concerning for possible tumor recurrence.  Based on that finding she was sent for an MRI for further evaluation on 10/8/2020 which confirmed that the appearance of the masses noted in the liver did not have an  appearance for suspicious finding for recurrent metastatic disease.  Patient continued with MRI follow up imaging every 3 months, most recent on 4/5/21 showed stable appearance of the liver with distortion of the right lobe consistent with prior partial hepatectomy and ablation.  No evidence of recurrent tumor.  Enhancing soft tissue nodule in the right cardiophrenic angle again seen and unchanged consistent with adenopathy, measuring 17 mm in size. Patient does follow-up with pulmonary scheduled May 2021.     3/9/21 patient had experienced significant fatigue and shortness of breath.  This had progressively worsened and she was also seen by cardiology who recommended patient be admitted to the hospital for further cardiac work-up.  Echocardiogram completed which showed an ejection fraction of 65%.  CT chest with contrast completed as well which is stable.  Patient was discharged home with no conclusion as to her significant shortness of breath and fatigue.  We did hold cycle 126 ([place] 118) due to her clinical status at that time.     CT chest completed on 3/9/2021 noted no consolidation.  There was a 1.7 x 2.6 cm nodule opacity in the right lung base which appears to crossover from the liver lesion.  This was previously seen on prior images as well.  The opacity has changed in size, suspected to be related to the liver ablation just adjacent to this location.  The partially visualized ablated cavity in the hepatic dome was noted as well.     Interval History  Patient clinically is doing very well.  Approximately 1 month ago additional hydration was added to treatment plan where patient receives 500 cc pretreatment, as well as for the first 10 days cc at pump disconnect.  Patient stated that she believes the hydration is working very well for her.  She states that she feels very good for the first 10 days or so.  She does note fatigue approximately the weekend before she returns to [place], but that does resolve by  "the time she is scheduled for day one of her next cycle.  She did note some abdominal pain in the right upper quadrant that lasted a few days.  She stated the pain was pretty significant but then she took 1 simethicone tablet and stated the pain had subsided pretty quickly.  She does continue to have the chest pain on Thursdays after treatment, almost immediately after her pump disconnect that resolves with oxycodone and is very stable.  She does continue to have intermittent nausea at times which is controlled when she stays on top of her Zofran.    Patient did state that she was pleased with the MRI results.  Per patient her next MRI will be scheduled for 6 months, October 2021.    Allergies   Allergen Reactions   • Oxaliplatin Anaphylaxis   • Codeine      \"gets drunk\"   • Pcn [Penicillins] Itching     itching   • Sulfa Drugs Itching     itching   • Tape Rash     PAPER TAPE OK     Current Outpatient Medications on File Prior to Visit   Medication Sig Dispense Refill   • albuterol 108 (90 Base) MCG/ACT Aero Soln inhalation aerosol Inhale 2 Puffs every 6 hours as needed for Shortness of Breath. 1 Each 11   • rivaroxaban (XARELTO) 10 MG Tab tablet Take 1 Tab by mouth every day. 30 Tab 5   • furosemide (LASIX) 40 MG Tab      • potassium chloride ER (KLOR-CON) 10 MEQ tablet Take 1 Tab by mouth every day. 100 Tab 3   • ondansetron (ZOFRAN) 4 MG Tab tablet Take 1 Tab by mouth every four hours as needed. 30 Tab 5   • lidocaine-prilocaine (EMLA) 2.5-2.5 % Cream Apply to port 1 hour prior to access of port and cover with plastic wrap. 30 g 3   • Tiotropium Bromide-Olodaterol (STIOLTO RESPIMAT) 2.5-2.5 MCG/ACT Aero Soln Take 2 Puffs by mouth every day. 1 Inhaler 11   • metronidazole (METROCREAM) 0.75 % cream Apply 1 Each to affected area(s) 2 times a day.     • cyanocobalamin (VITAMIN B-12) 500 MCG Tab Take 1,000 mcg by mouth every day.     • Multiple Vitamins-Minerals (CENTRUM SILVER PO) Take 1 Tab by mouth every day.     • " "Cholecalciferol (VITAMIN D3) 400 UNITS CAPS Take 1 Cap by mouth every day.     • loratadine (CLARITIN) 10 MG TABS Take 10 mg by mouth every day. Indications: [place]     • Tiotropium Bromide-Olodaterol (STIOLTO RESPIMAT) 2.5-2.5 MCG/ACT Aero Soln Take 2 Puffs by mouth every day. (Patient not taking: Reported on 4/20/2021) 2 Each 0     Current Facility-Administered Medications on File Prior to Visit   Medication Dose Route Frequency Provider Last Rate Last Admin   • dexamethasone (DECADRON) injection 8 mg  8 mg Intravenous Once Lucho Steele M.D.       • fluorouracil (ADRUCIL) 3,475 mg in CADD Cassette/Bag Chemo infusion (for use in CADD PUMP)  1,920 mg/m2 Intravenous Once Lucho Steele M.D.           Review of Systems   Constitutional: Positive for malaise/fatigue (intermittent at times but improving with increase in hydration during treatment). Negative for chills, fever and weight loss.   Respiratory: Positive for cough (dry cough intermittent and stable) and shortness of breath (with activity and stable).    Cardiovascular: Positive for chest pain (post treatment that lasts the day after pump disconnect).   Gastrointestinal: Positive for nausea (prsent but not too bad and controlled with anti-emetics). Negative for constipation, diarrhea and vomiting.        Output via ostomy is normal   Genitourinary: Negative for dysuria.   Musculoskeletal: Negative for myalgias.   Neurological: Negative for dizziness and headaches.          Objective:     /68 (BP Location: Right arm, Patient Position: Sitting, BP Cuff Size: Adult)   Pulse 98   Temp 36.7 °C (98.1 °F) (Temporal)   Resp 16   Ht 1.63 m (5' 4.17\")   Wt 72.3 kg (159 lb 6.3 oz)   LMP  (LMP Unknown)   SpO2 98%   BMI 27.21 kg/m²      Physical Exam  Vitals reviewed.   Constitutional:       General: She is not in acute distress.     Appearance: Normal appearance. She is not diaphoretic.   HENT:      Head: Normocephalic and atraumatic.   Cardiovascular: "      Rate and Rhythm: Normal rate and regular rhythm.      Pulses: Normal pulses.      Heart sounds: Normal heart sounds. No murmur. No friction rub. No gallop.    Pulmonary:      Effort: Pulmonary effort is normal. No respiratory distress.      Breath sounds: Normal breath sounds. No wheezing.   Abdominal:      General: Bowel sounds are normal. There is no distension.      Palpations: Abdomen is soft.      Tenderness: There is no abdominal tenderness.   Musculoskeletal:         General: No swelling or tenderness. Normal range of motion.   Skin:     General: Skin is warm and dry.   Neurological:      Mental Status: She is alert and oriented to person, place, and time.   Psychiatric:         Mood and Affect: Mood normal.         Behavior: Behavior normal.         Results for KANG HALL (MRN [id number])    Ref. Range 4/20/2021 07:17   WBC Latest Ref Range: 4.8 - 10.8 K/uL 8.1   RBC Latest Ref Range: 4.20 - 5.40 M/uL 4.15 (L)   Hemoglobin Latest Ref Range: 12.0 - 16.0 g/dL 10.7 (L)   Hematocrit Latest Ref Range: 37.0 - 47.0 % 35.0 (L)   MCV Latest Ref Range: 81.4 - 97.8 fL 84.3   MCH Latest Ref Range: 27.0 - 33.0 pg 25.8 (L)   MCHC Latest Ref Range: 33.6 - 35.0 g/dL 30.6 (L)   RDW Latest Ref Range: 35.9 - 50.0 fL 54.2 (H)   Platelet Count Latest Ref Range: 164 - 446 K/uL 266   MPV Latest Ref Range: 9.0 - 12.9 fL 9.6   Neutrophils-Polys Latest Ref Range: 44.00 - 72.00 % 74.60 (H)   Neutrophils (Absolute) Latest Ref Range: 2.00 - 7.15 K/uL 6.06   Lymphocytes Latest Ref Range: 22.00 - 41.00 % 12.30 (L)   Lymphs (Absolute) Latest Ref Range: 1.00 - 4.80 K/uL 1.00   Monocytes Latest Ref Range: 0.00 - 13.40 % 9.20   Monos (Absolute) Latest Ref Range: 0.00 - 0.85 K/uL 0.75   Eosinophils Latest Ref Range: 0.00 - 6.90 % 2.50   Eos (Absolute) Latest Ref Range: 0.00 - 0.51 K/uL 0.20   Basophils Latest Ref Range: 0.00 - 1.80 % 0.90   Baso (Absolute) Latest Ref Range: 0.00 - 0.12 K/uL 0.07   Immature Granulocytes Latest Ref  Range: 0.00 - 0.90 % 0.50   Immature Granulocytes (abs) Latest Ref Range: 0.00 - 0.11 K/uL 0.04   Nucleated RBC Latest Units: /100 WBC 0.00   NRBC (Absolute) Latest Units: K/uL 0.00   Sodium Latest Ref Range: 135 - 145 mmol/L 131 (L)   Potassium Latest Ref Range: 3.6 - 5.5 mmol/L 3.5 (L)   Chloride Latest Ref Range: 96 - 112 mmol/L 98   Co2 Latest Ref Range: 20 - 33 mmol/L 23   Anion Gap Latest Ref Range: 7.0 - 16.0  10.0   Glucose Latest Ref Range: 65 - 99 mg/dL 126 (H)   Bun Latest Ref Range: 8 - 22 mg/dL 10   Creatinine Latest Ref Range: 0.50 - 1.40 mg/dL 0.96   GFR If  Latest Ref Range: >60 mL/min/1.73 m 2 >60   GFR If Non  Latest Ref Range: >60 mL/min/1.73 m 2 56 (A)   Calcium Latest Ref Range: 8.5 - 10.5 mg/dL 9.1   AST(SGOT) Latest Ref Range: 12 - 45 U/L 23   ALT(SGPT) Latest Ref Range: 2 - 50 U/L 17   Alkaline Phosphatase Latest Ref Range: 30 - 99 U/L 202 (H)   Total Bilirubin Latest Ref Range: 0.1 - 1.5 mg/dL 0.6   Albumin Latest Ref Range: 3.2 - 4.9 g/dL 3.4   Total Protein Latest Ref Range: 6.0 - 8.2 g/dL 6.6   Globulin Latest Ref Range: 1.9 - 3.5 g/dL 3.2   A-G Ratio Latest Units: g/dL 1.1   Carcinoembryonic Antigen Latest Ref Range: 0.0 - 3.0 ng/mL 1.3         MR-ABDOMEN-WITH & W/O    Addendum Date: 4/5/2021    These findings were discussed with BOUBACAR CHAMBERLAIN on 4/5/2021 4:33 PM.    Result Date: 4/5/2021 4/5/2021 1:35 PM HISTORY/REASON FOR EXAM:  Liver lesion, > 1cm, US nondiagnostic.  Multiple surgeries.  Follow-up. TECHNIQUE/EXAM DESCRIPTION: MRI of the liver with dynamic IV gadolinium enhancement. MR imaging of the liver was performed.  MR images of the liver were obtained with coronal and axial single-shot fast spin-echo T2, fat-suppressed axial FRFSE T2, axial in-phase and out-of-phase FSPGR T1, axial DWI with a b-value of 600, 3D MRCP,  precontrast fat-suppressed FSPGR T1, dynamic gadolinium enhanced axial fat-suppressed T1 FSPGR in the arterial dominant,  portal venous, 2-minute and 4-minute delayed phases, with delayed coronal fat-suppressed T1 FSPGR sequence. . The study was performed on a [place] 3.0 [employer] MRI scanner. 15 mL ProHance contrast was administered intravenously. COMPARISON: 1/11/2021 FINDINGS: Distortion of the superior RIGHT hepatic lobe again seen consistent with partial resection and prior ablation.  Heterogeneous enhancement surrounds the ablation cavity, similar to prior.  No new focus of persistent effusion. Focal enhancing lesion again seen in the RIGHT costophrenic angle measuring 17 mm, stable. Portal, splenic and superior mesenteric veins are patent. RIGHT mid abdominal stoma present. No gross marcos hepatis or retroperitoneal adenopathy. The spleen is unremarkable. Adrenal glands are unremarkable. The kidneys are unremarkable.     1.  Stable appearance of the liver with distortion of the RIGHT lobe consistent with prior partial hepatectomy and ablation.  No evidence to indicate recurrent tumor. 2.  Enhancing soft tissue nodule in the RIGHT cardiophrenic angle likely again seen and unchanged, consistent with adenopathy.          Assessment/Plan:        1. Metastatic colon cancer in female (HCC)     2. Secondary malignant neoplasm of liver (HCC)     3. History of pulmonary embolus (PE)     4. Current use of long term anticoagulation     5. Elevated alkaline phosphatase level         1.  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  She is scheduled to receive cycle #128 ([place] plan #120) today.  I did personally review her CBC, CMP and CEA and labs are appropriate to proceed with treatment as planned.  Patient clinically is very stable as well.  Patient has noticed improvement in her overall clinical status post treatment with the addition of 500 cc pre and post treatment and will continue with the hydration due to improvement in her clinical status.    2.  Most recent MRI of the abdomen is stable.  Per  medical record, surgeon, Dr. West has plan to repeat an MRI in 6 months, October 2021.    3.  Continuing to monitor patient's alkaline phosphatase which has trended down this week to 202 (previous 256).  We will continue to monitor.    4.  Patient with history of pulmonary embolism, currently on low-dose Xarelto and tolerating it well.  Patient to continue as prescribed.    5.  Patient known to have cancer related pain, primarily following her chemotherapy treatments every 2 weeks.  This is resolved with oxycodone medication that she takes intermittently and very sparingly.     6.  We will continue to monitor patient every 2 weeks prior to each cycle of chemotherapy at this time.  She is to return to the office sooner if needed.      Addendum: Discussed with Dr. Steele.  Patient had a CT chest on 3/9/2021 due to her shortness of breath complaint and hospitalization at that time.  Patient was initially scheduled for repeat CT on 5/17/2021, however since she recently had the CT scan will postpone until mid June, 3 months later. Will discuss with pulmonary.

## 2021-04-20 NOTE — PROGRESS NOTES
"Pharmacy Chemotherapy Verification  Patient Name: Karlene Walters      Dx:  Met Colon CA      Protocol: 5-FU + Leucovorin     Leucovorin 400 mg/m² IV over 2 hours on Day 1, followed by  Fluorouracil 400 mg/m²  IVP on Day 1, followed by                --7/5/16: Dose reduced by 20% to 320 mg/m² starting with Cycle 18 ([facility] #11)  due to neutropenia                -- 10/31/17: delete Leucovorin and 5-FU push d/t neutropenia per Dr. Hardy   Fluorouracil 2400 mg/m² continuous infusion over 46-48 hours                --7/5/16: Infusion Dose reduced by 20% starting with Cycle 18 ([facility] #11)  due to neutropenia               --20% reduction (1920 mg/m²) to be continued starting C28 per C Alsop APRN    14 day cycles for 12 cycles (adjuvant) or until disease progression or unacceptable toxicity  *Dosing Reference*     NCCN Guidelines for Colon Cancer. V.2.2015.  Jay T, et al. Eur J Cancer.1999;35(9):1343-7.     Allergies:  Oxaliplatin; Codeine; Pcn [penicillins]; Sulfa drugs; and Tape     /55 Comment: Transfered over from this morning.  Pulse (!) 102 Comment: Transfered over from this morning.  Temp 36.3 °C (97.3 °F) (Temporal) Comment: Transfered over from this morning. Comment (Src): Transfered over from this morning.  Resp 18 Comment: Transfered over from this morning.  Ht 1.63 m (5' 4.17\") Comment: Transfered over from this morning.  Wt 72.5 kg (159 lb 13.3 oz) Comment: Transfered over from this morning.  LMP  (LMP Unknown)   SpO2 99% Comment: Transfered over from this morning.  BMI 27.29 kg/m²  Body surface area is 1.81 meters squared.      Labs: 4/20/21   Meet treatment parameters   *Na+ 131       Drug Order   (Drug name, dose, route, IV Fluid & volume, frequency, number of doses) Cycle 128 ([facility] cycle 120)    Previous treatment: 4/6/21     Medication = Fluorouracil (5-FU)  Base Dose = 1920 mg/m²  Calc Dose: Base Dose x 1.81 m² = 3475.2 mg  Final Dose = 3475 mg   Route = CIVI via CADD pump  Fluid " & Volume = 69.5 mL (+ 3 mL overfill = 72.5 mL)  Admin Duration = Over 46 hours @ 1.5 mL/hour   Via CADD pump for home infusion      Ok to treat with final written dose

## 2021-04-20 NOTE — PROGRESS NOTES
[facility]ene [facility] for Day 1/ Cycle 120 (per [facility]) of hydration and 5FU CADD pump. Port accessed prior, had + blood return, flushed briskly. Pt given hydration and dexamethasone as prescribed, tolerated well, denied having any complaints during or after infusion. Karlene states she has potassium pills at home and that has yet to take today's dose. Declines electrolyte replacement. Port then flushed with brisk blood return observed prior to attaching 5FU CADD pump. 5FU CADD pump attached and infusing without issue, all clamps left unclamped. Karlene denies having further needs at this time, Karlene to return 4/22/21 for de-access. Karlene discharged home to self care.

## 2021-04-20 NOTE — PROGRESS NOTES
Chemotherapy Verification - SECONDARY RN       Height = 163 cm  Weight = 72.5 kg  BSA = 1.81 m2       Medication: Fluorouracil  Dose: 1920 mg/m2 dr Calculated Dose: 3478.6 mg over 46 hours                             (In mg/m2, AUC, mg/kg)     I confirm that this process was performed independently.

## 2021-04-20 NOTE — PROGRESS NOTES
Pt to [facility] for C120 pre-chemo lab draw.  Pt w/ no s/s of infection, no complaints at this time.  PORT accessed utilizing sterile technique, brisk blood return noted, labs drawn per orders, flushed and heparinized per protocol, left accessed for chemo appt at 0800.  Pt left in care of self in NAD.  Pt to return at 0800, confirmed w/ pt.   no

## 2021-04-20 NOTE — PROGRESS NOTES
"Pharmacy Chemotherapy Calculations Note:    Patient Name: Karlene Walters        Dx: Colon CA  Cycle:  128 ([place] 120) Previous treatment: 127 ([place] 119)     Protocol:  Fluorouracil + Leucovorin     Leucovorin 400 mg/m² IV over 2 hours on Day 1, followed by  Fluorouracil 400 mg/m²  IVP on Day 1, followed by                -- 7/5/16: Dose reduced by 20% to 320 mg/m² starting with Cycle 18 ([place] #11)  due to neutropenia                -- 10/31/17: delete Leucovorin and 5-FU push d/t neutropenia per Dr. Hardy   Fluorouracil 2400 mg/m² continuous infusion over 46-48 hours                -- 7/5/16: Infusion Dose reduced by 20% starting with Cycle 18 ([place] #11)  due to neutropenia               -- 20% reduction (1920 mg/m²) to be continued starting C28 per C Alsop APRN               14 day cycles for 12 cycles (adjuvant) or until disease progression or unacceptable toxicity     NCCN Guidelines for Colon Cancer. V.2.2015.  Jay T, et al. Eur J Cancer.1999;35(9):1343-7.             /55 Comment: Transfered over from this morning.  Pulse (!) 102 Comment: Transfered over from this morning.  Temp 36.3 °C (97.3 °F) (Temporal) Comment: Transfered over from this morning. Comment (Src): Transfered over from this morning.  Resp 18 Comment: Transfered over from this morning.  Ht 1.63 m (5' 4.17\") Comment: Transfered over from this morning.  Wt 72.5 kg (159 lb 13.3 oz) Comment: Transfered over from this morning.  LMP  (LMP Unknown)   SpO2 99% Comment: Transfered over from this morning.  BMI 27.29 kg/m²  Body surface area is 1.81 meters squared.  ANC~ 6060  Plt = 266 k    SCr = 0.96 mg/dL CrCl = 53 mL/min  LFT's = WNL  TBili = 0.6     Fluorouracil 1920 mg/m² x 1.81 m² = 3475 mg   <10% difference, ok to treat with final written dose = 3475 mg CIVI via CADD pump      Rudy Arthur, PharmD, PharmD, BCPS             "

## 2021-04-22 ENCOUNTER — OUTPATIENT INFUSION SERVICES (OUTPATIENT)
Dept: ONCOLOGY | Facility: MEDICAL CENTER | Age: 81
End: 2021-04-22
Attending: INTERNAL MEDICINE
Payer: MEDICARE

## 2021-04-22 VITALS
HEART RATE: 88 BPM | RESPIRATION RATE: 18 BRPM | DIASTOLIC BLOOD PRESSURE: 59 MMHG | SYSTOLIC BLOOD PRESSURE: 137 MMHG | OXYGEN SATURATION: 97 % | TEMPERATURE: 97.4 F

## 2021-04-22 DIAGNOSIS — R94.4 DECREASED GFR: ICD-10-CM

## 2021-04-22 DIAGNOSIS — C20 MALIGNANT NEOPLASM OF RECTUM (HCC): ICD-10-CM

## 2021-04-22 PROCEDURE — 700105 HCHG RX REV CODE 258: Performed by: NURSE PRACTITIONER

## 2021-04-22 PROCEDURE — 96523 IRRIG DRUG DELIVERY DEVICE: CPT

## 2021-04-22 PROCEDURE — 700111 HCHG RX REV CODE 636 W/ 250 OVERRIDE (IP): Performed by: INTERNAL MEDICINE

## 2021-04-22 PROCEDURE — 96360 HYDRATION IV INFUSION INIT: CPT

## 2021-04-22 RX ORDER — HEPARIN SODIUM (PORCINE) LOCK FLUSH IV SOLN 100 UNIT/ML 100 UNIT/ML
500 SOLUTION INTRAVENOUS PRN
Status: DISCONTINUED | OUTPATIENT
Start: 2021-04-22 | End: 2021-04-22 | Stop reason: HOSPADM

## 2021-04-22 RX ORDER — SODIUM CHLORIDE 9 MG/ML
500 INJECTION, SOLUTION INTRAVENOUS ONCE
Status: COMPLETED | OUTPATIENT
Start: 2021-04-22 | End: 2021-04-22

## 2021-04-22 RX ADMIN — SODIUM CHLORIDE 500 ML: 9 INJECTION, SOLUTION INTRAVENOUS at 11:57

## 2021-04-22 RX ADMIN — HEPARIN 500 UNITS: 100 SYRINGE at 13:03

## 2021-04-22 NOTE — PROGRESS NOTES
Patient here for hydration/pump disconnect. 0 ml remaining in pump. 70.9 ml given. Cassette removed from pump. 500 ml NS given per MAR over 1 hour. Port flushed with 20 ml of NS and heparin locked. Port de-accessed; gauze applied over site. Next appointment scheduled. Discharged to self care; no apparent distress noted.

## 2021-04-23 ENCOUNTER — PATIENT MESSAGE (OUTPATIENT)
Dept: HEALTH INFORMATION MANAGEMENT | Facility: OTHER | Age: 81
End: 2021-04-23

## 2021-04-26 ENCOUNTER — TELEPHONE (OUTPATIENT)
Dept: HEMATOLOGY ONCOLOGY | Facility: MEDICAL CENTER | Age: 81
End: 2021-04-26

## 2021-04-30 ENCOUNTER — PATIENT OUTREACH (OUTPATIENT)
Dept: OTHER | Facility: MEDICAL CENTER | Age: 81
End: 2021-04-30

## 2021-05-03 RX ORDER — HEPARIN SODIUM (PORCINE) LOCK FLUSH IV SOLN 100 UNIT/ML 100 UNIT/ML
500 SOLUTION INTRAVENOUS PRN
Status: CANCELLED | OUTPATIENT
Start: 2021-05-31

## 2021-05-03 RX ORDER — HEPARIN SODIUM (PORCINE) LOCK FLUSH IV SOLN 100 UNIT/ML 100 UNIT/ML
500 SOLUTION INTRAVENOUS PRN
Status: CANCELLED | OUTPATIENT
Start: 2021-05-03

## 2021-05-03 RX ORDER — 0.9 % SODIUM CHLORIDE 0.9 %
20 VIAL (ML) INJECTION PRN
Status: CANCELLED | OUTPATIENT
Start: 2021-06-28

## 2021-05-03 RX ORDER — 0.9 % SODIUM CHLORIDE 0.9 %
20 VIAL (ML) INJECTION PRN
Status: CANCELLED | OUTPATIENT
Start: 2021-05-31

## 2021-05-03 RX ORDER — HEPARIN SODIUM (PORCINE) LOCK FLUSH IV SOLN 100 UNIT/ML 100 UNIT/ML
500 SOLUTION INTRAVENOUS PRN
Status: CANCELLED | OUTPATIENT
Start: 2021-06-28

## 2021-05-03 RX ORDER — 0.9 % SODIUM CHLORIDE 0.9 %
20 VIAL (ML) INJECTION PRN
Status: CANCELLED | OUTPATIENT
Start: 2021-05-03

## 2021-05-03 NOTE — PROGRESS NOTES
"Pharmacy Chemotherapy Verification  Patient Name: Karlene Walters      Dx:  Met Colon CA      Protocol: 5-FU + Leucovorin  Cycle 129 ([place] cycle 121)    Previous treatment: 4/22/21       Leucovorin 400 mg/m² IV over 2 hours on Day 1, followed by  Fluorouracil 400 mg/m²  IVP on Day 1, followed by                --7/5/16: Dose reduced by 20% to 320 mg/m² starting with Cycle 18 ([place] #11)  due to neutropenia                -- 10/31/17: delete Leucovorin and 5-FU push d/t neutropenia per Dr. Hardy   Fluorouracil 2400 mg/m² continuous infusion over 46-48 hours                --7/5/16: Infusion Dose reduced by 20% starting with Cycle 18 ([place] #11)  due to neutropenia               --20% reduction (1920 mg/m²) to be continued starting C28 per LLOYD Alsop APRN    14 day cycles until disease progression or unacceptable toxicity  *Dosing Reference*     NCCN Guidelines for Colon Cancer. V.2.2015.  Jay T, et al. Eur J Cancer.1999;35(9):1343-7.     Allergies:  Oxaliplatin; Codeine; Pcn [penicillins]; Sulfa drugs; and Tape     Ht 1.63 m (5' 4.17\")   Wt 71 kg (156 lb 8.4 oz)   LMP  (LMP Unknown)   BMI 26.72 kg/m²  Body surface area is 1.79 meters squared.      Labs: 5/4/21   Meet treatment parameters     Fluorouracil (5-FU) 1920 mg/m² x 1.79 m² = 3436.8 mg   Ok to treat with final dose = 3435 mg       "

## 2021-05-04 ENCOUNTER — OFFICE VISIT (OUTPATIENT)
Dept: HEMATOLOGY ONCOLOGY | Facility: MEDICAL CENTER | Age: 81
End: 2021-05-04
Payer: MEDICARE

## 2021-05-04 ENCOUNTER — OUTPATIENT INFUSION SERVICES (OUTPATIENT)
Dept: ONCOLOGY | Facility: MEDICAL CENTER | Age: 81
End: 2021-05-04
Attending: INTERNAL MEDICINE
Payer: MEDICARE

## 2021-05-04 VITALS
HEART RATE: 118 BPM | RESPIRATION RATE: 18 BRPM | WEIGHT: 156.53 LBS | SYSTOLIC BLOOD PRESSURE: 138 MMHG | HEIGHT: 64 IN | BODY MASS INDEX: 26.72 KG/M2 | OXYGEN SATURATION: 100 % | TEMPERATURE: 97.5 F | DIASTOLIC BLOOD PRESSURE: 63 MMHG

## 2021-05-04 VITALS
OXYGEN SATURATION: 97 % | TEMPERATURE: 98.8 F | HEART RATE: 114 BPM | HEIGHT: 66 IN | SYSTOLIC BLOOD PRESSURE: 130 MMHG | BODY MASS INDEX: 25.14 KG/M2 | RESPIRATION RATE: 18 BRPM | DIASTOLIC BLOOD PRESSURE: 60 MMHG | WEIGHT: 156.42 LBS

## 2021-05-04 VITALS — BODY MASS INDEX: 26.72 KG/M2 | WEIGHT: 156.53 LBS | HEIGHT: 64 IN

## 2021-05-04 DIAGNOSIS — R05.9 COUGH: ICD-10-CM

## 2021-05-04 DIAGNOSIS — R94.4 DECREASED GFR: ICD-10-CM

## 2021-05-04 DIAGNOSIS — Z86.711 HISTORY OF PULMONARY EMBOLUS (PE): ICD-10-CM

## 2021-05-04 DIAGNOSIS — C20 MALIGNANT NEOPLASM OF RECTUM (HCC): Chronic | ICD-10-CM

## 2021-05-04 DIAGNOSIS — Z79.01 CURRENT USE OF LONG TERM ANTICOAGULATION: ICD-10-CM

## 2021-05-04 DIAGNOSIS — C20 MALIGNANT NEOPLASM OF RECTUM (HCC): ICD-10-CM

## 2021-05-04 DIAGNOSIS — R74.8 ELEVATED ALKALINE PHOSPHATASE LEVEL: ICD-10-CM

## 2021-05-04 DIAGNOSIS — C78.7 SECONDARY MALIGNANT NEOPLASM OF LIVER (HCC): Chronic | ICD-10-CM

## 2021-05-04 LAB
ALBUMIN SERPL BCP-MCNC: 3.5 G/DL (ref 3.2–4.9)
ALBUMIN/GLOB SERPL: 1 G/DL
ALP SERPL-CCNC: 195 U/L (ref 30–99)
ALT SERPL-CCNC: 17 U/L (ref 2–50)
ANION GAP SERPL CALC-SCNC: 13 MMOL/L (ref 7–16)
ANISOCYTOSIS BLD QL SMEAR: ABNORMAL
AST SERPL-CCNC: 28 U/L (ref 12–45)
BASOPHILS # BLD AUTO: 0.5 % (ref 0–1.8)
BASOPHILS # BLD: 0.05 K/UL (ref 0–0.12)
BILIRUB SERPL-MCNC: 0.7 MG/DL (ref 0.1–1.5)
BUN SERPL-MCNC: 15 MG/DL (ref 8–22)
CALCIUM SERPL-MCNC: 9.4 MG/DL (ref 8.5–10.5)
CEA SERPL-MCNC: 2 NG/ML (ref 0–3)
CHLORIDE SERPL-SCNC: 98 MMOL/L (ref 96–112)
CO2 SERPL-SCNC: 21 MMOL/L (ref 20–33)
COMMENT 1642: NORMAL
CREAT SERPL-MCNC: 1.06 MG/DL (ref 0.5–1.4)
EOSINOPHIL # BLD AUTO: 0.2 K/UL (ref 0–0.51)
EOSINOPHIL NFR BLD: 2 % (ref 0–6.9)
ERYTHROCYTE [DISTWIDTH] IN BLOOD BY AUTOMATED COUNT: 56.6 FL (ref 35.9–50)
GLOBULIN SER CALC-MCNC: 3.5 G/DL (ref 1.9–3.5)
GLUCOSE SERPL-MCNC: 182 MG/DL (ref 65–99)
HCT VFR BLD AUTO: 37.8 % (ref 37–47)
HGB BLD-MCNC: 11.2 G/DL (ref 12–16)
HYPOCHROMIA BLD QL SMEAR: ABNORMAL
IMM GRANULOCYTES # BLD AUTO: 0.09 K/UL (ref 0–0.11)
IMM GRANULOCYTES NFR BLD AUTO: 0.9 % (ref 0–0.9)
LYMPHOCYTES # BLD AUTO: 1.19 K/UL (ref 1–4.8)
LYMPHOCYTES NFR BLD: 11.6 % (ref 22–41)
MCH RBC QN AUTO: 25.2 PG (ref 27–33)
MCHC RBC AUTO-ENTMCNC: 29.6 G/DL (ref 33.6–35)
MCV RBC AUTO: 85.1 FL (ref 81.4–97.8)
MONOCYTES # BLD AUTO: 0.79 K/UL (ref 0–0.85)
MONOCYTES NFR BLD AUTO: 7.7 % (ref 0–13.4)
MORPHOLOGY BLD-IMP: NORMAL
NEUTROPHILS # BLD AUTO: 7.92 K/UL (ref 2–7.15)
NEUTROPHILS NFR BLD: 77.3 % (ref 44–72)
NRBC # BLD AUTO: 0 K/UL
NRBC BLD-RTO: 0 /100 WBC
OVALOCYTES BLD QL SMEAR: NORMAL
PLATELET # BLD AUTO: 420 K/UL (ref 164–446)
PLATELET BLD QL SMEAR: NORMAL
PMV BLD AUTO: 10.2 FL (ref 9–12.9)
POTASSIUM SERPL-SCNC: 4 MMOL/L (ref 3.6–5.5)
PROT SERPL-MCNC: 7 G/DL (ref 6–8.2)
RBC # BLD AUTO: 4.44 M/UL (ref 4.2–5.4)
RBC BLD AUTO: PRESENT
SODIUM SERPL-SCNC: 132 MMOL/L (ref 135–145)
WBC # BLD AUTO: 10.2 K/UL (ref 4.8–10.8)

## 2021-05-04 PROCEDURE — 80053 COMPREHEN METABOLIC PANEL: CPT

## 2021-05-04 PROCEDURE — 82378 CARCINOEMBRYONIC ANTIGEN: CPT

## 2021-05-04 PROCEDURE — G0498 CHEMO EXTEND IV INFUS W/PUMP: HCPCS

## 2021-05-04 PROCEDURE — 700111 HCHG RX REV CODE 636 W/ 250 OVERRIDE (IP): Performed by: INTERNAL MEDICINE

## 2021-05-04 PROCEDURE — 700105 HCHG RX REV CODE 258: Performed by: INTERNAL MEDICINE

## 2021-05-04 PROCEDURE — 96375 TX/PRO/DX INJ NEW DRUG ADDON: CPT

## 2021-05-04 PROCEDURE — 99214 OFFICE O/P EST MOD 30 MIN: CPT | Performed by: NURSE PRACTITIONER

## 2021-05-04 PROCEDURE — 85025 COMPLETE CBC W/AUTO DIFF WBC: CPT

## 2021-05-04 PROCEDURE — 700111 HCHG RX REV CODE 636 W/ 250 OVERRIDE (IP)

## 2021-05-04 PROCEDURE — 96374 THER/PROPH/DIAG INJ IV PUSH: CPT | Mod: XU

## 2021-05-04 PROCEDURE — 96361 HYDRATE IV INFUSION ADD-ON: CPT

## 2021-05-04 PROCEDURE — A4212 NON CORING NEEDLE OR STYLET: HCPCS

## 2021-05-04 RX ORDER — SODIUM CHLORIDE 9 MG/ML
500 INJECTION, SOLUTION INTRAVENOUS ONCE
Status: COMPLETED | OUTPATIENT
Start: 2021-05-04 | End: 2021-05-04

## 2021-05-04 RX ORDER — HEPARIN SODIUM (PORCINE) LOCK FLUSH IV SOLN 100 UNIT/ML 100 UNIT/ML
SOLUTION INTRAVENOUS
Status: COMPLETED
Start: 2021-05-04 | End: 2021-05-04

## 2021-05-04 RX ORDER — DEXAMETHASONE SODIUM PHOSPHATE 4 MG/ML
8 INJECTION, SOLUTION INTRA-ARTICULAR; INTRALESIONAL; INTRAMUSCULAR; INTRAVENOUS; SOFT TISSUE ONCE
Status: COMPLETED | OUTPATIENT
Start: 2021-05-04 | End: 2021-05-04

## 2021-05-04 RX ADMIN — DEXAMETHASONE SODIUM PHOSPHATE 8 MG: 4 INJECTION, SOLUTION INTRA-ARTICULAR; INTRALESIONAL; INTRAMUSCULAR; INTRAVENOUS; SOFT TISSUE at 09:14

## 2021-05-04 RX ADMIN — SODIUM CHLORIDE 500 ML: 9 INJECTION, SOLUTION INTRAVENOUS at 08:12

## 2021-05-04 RX ADMIN — FLUOROURACIL 3435 MG: 50 INJECTION, SOLUTION INTRAVENOUS at 09:33

## 2021-05-04 RX ADMIN — HEPARIN 5 ML: 100 SYRINGE at 07:10

## 2021-05-04 ASSESSMENT — ENCOUNTER SYMPTOMS
WEIGHT LOSS: 0
HEADACHES: 0
HEMOPTYSIS: 0
COUGH: 1
NAUSEA: 0
SHORTNESS OF BREATH: 1
DIAPHORESIS: 0
FEVER: 0
PALPITATIONS: 0
SPUTUM PRODUCTION: 1
CONSTIPATION: 0
SORE THROAT: 0
WHEEZING: 0
DIZZINESS: 0
DIARRHEA: 0
VOMITING: 0
MYALGIAS: 0
TINGLING: 0
CHILLS: 0

## 2021-05-04 ASSESSMENT — FIBROSIS 4 INDEX
FIB4 SCORE: 1.7
FIB4 SCORE: 1.309692375196198057
FIB4 SCORE: 1.7

## 2021-05-04 ASSESSMENT — PAIN SCALES - GENERAL: PAINLEVEL: NO PAIN

## 2021-05-04 NOTE — PROGRESS NOTES
Karlene returns to IS for hydration and 5FU pump connect.  Port previously accessed and labs collected.  Labs reviewed, parameters met to receive treatment today.  Hydration and pre-med administered per MAR.  Port flushed, brisk blood return observed.   5FU pump connected, all clamps left unclamped, CADD pump confirmed to be in RUN mode, placed in patient's konrad pack.  Karlene left IS in no apparent distress, returns on 05/06/2021 for next appointment.

## 2021-05-04 NOTE — PROGRESS NOTES
"Pharmacy Chemotherapy Verification  Patient Name: Karlene Walters      Dx:  Met Colon CA      Protocol: 5-FU + Leucovorin     Leucovorin 400 mg/m² IV over 2 hours on Day 1, followed by  Fluorouracil 400 mg/m²  IVP on Day 1, followed by                --7/5/16: Dose reduced by 20% to 320 mg/m² starting with Cycle 18 ([place] #11)  due to neutropenia                -- 10/31/17: delete Leucovorin and 5-FU push d/t neutropenia per Dr. Hardy   Fluorouracil 2400 mg/m² continuous infusion over 46-48 hours                --7/5/16: Infusion Dose reduced by 20% starting with Cycle 18 ([place] #11)  due to neutropenia               --20% reduction (1920 mg/m²) to be continued starting C28 per C Alsop APRN    14 day cycles for 12 cycles (adjuvant) or until disease progression or unacceptable toxicity  *Dosing Reference*     NCCN Guidelines for Colon Cancer. V.2.2015.  Jay T, et al. Eur J Cancer.1999;35(9):1343-7.     Allergies:  Oxaliplatin; Codeine; Pcn [penicillins]; Sulfa drugs; and Tape     Ht 1.63 m (5' 4.17\")   Wt 71 kg (156 lb 8.4 oz)   LMP  (LMP Unknown)   BMI 26.72 kg/m²  Body surface area is 1.79 meters squared.      Labs 5/4/21:  ANC~ 7920 Plt = 420k   Hgb = 11.2     SCr = 1.06 mg/dL CrCl ~ 47 mL/min   AST/ALT/ALK = 28/17/195 TBili = 0.7        Drug Order   (Drug name, dose, route, IV Fluid & volume, frequency, number of doses) Cycle 129 ([place] cycle 121 )    Previous treatment: 4/20/21     Medication = Fluorouracil (5-FU)  Base Dose = 1920 mg/m²  Calc Dose: Base Dose x 1.79 m² = 3436.8 mg  Final Dose = 3435 mg   Route = CIVI   Conc = 50 mg/mL  Fluid & Volume = 68.7 mL (+ 3 mL overfill = 71.7 mL)  Admin Duration = Over 46 hours @ 1.5 mL/hour   Via CADD pump for home infusion      <10% difference, okay to treat with final dose     By my signature below, I confirm this process was performed independently with the BSA and all final chemotherapy dosing calculations congruent. I have reviewed the above " chemotherapy order and that my calculation of the final dose and BSA (when applicable) corroborate those calculations of the  pharmacist. Discrepancies of 10% or greater in the written dose have been addressed and documented within the EPIC Progress notes.    Asha Krishnan, PharmD

## 2021-05-04 NOTE — PROGRESS NOTES
Patient presents for lab draw prior to chemotherapy later this morning. Port accessed using sterile technique, flushes well with blood drawn as ordered. Port flushed per protocol and left in place. Patient released in no acute distress.

## 2021-05-04 NOTE — PROGRESS NOTES
Chemotherapy Verification - SECONDARY RN       Height = 166.4 cm  Weight = 70.9 kg  BSA = 1.81 m2       Medication: 5FU  Dose: 1920 mg/m2  Calculated Dose: 3475.75 mg over 46 hours                             (In mg/m2, AUC, mg/kg)       I confirm that this process was performed independently.

## 2021-05-04 NOTE — PROGRESS NOTES
Chemotherapy Verification - PRIMARY RN      Height = 166.4 cm  Weight = 70.9 m2  BSA = 1.81 m2       Medication: Fluorouracil  Dose: 1920 mg/m2  Calculated Dose: 3475.2 mg                                     I confirm this process was performed independently with the BSA and all final chemotherapy dosing calculations congruent.  Any discrepancies of 10% or greater have been addressed with the chemotherapy pharmacist. The resolution of the discrepancy has been documented in the EPIC progress notes.

## 2021-05-04 NOTE — PROGRESS NOTES
Subjective:      Karlene Walters is a 81 y.o. 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  She presents unaccompanied for today's visit.  Patient is scheduled to receive cycle #129 ([place] plan #121) of single agent 5-FU which she continues on every 2-week interval.     Cancer History  Patient with a long history of rectal carcinoma initially diagnosed in 2012.  She was found to have metastatic disease to liver and lung in 2015.  She is status post liver ablation as well as has received Y 90 in the past.  She was on XELOX initially but had allergic reaction to oxaliplatin (last dose 7/28/15) and was switched to single agent 5-FU. Patient initially on 5-FU at 2400 mg/m2 with Leucovorin and 5-FU push starting on 10/27/15, followed by 20% dose reduction to 1920 mg/m2 for cycle 18 (7/5/16), followed by deletion of Leucovorin and 5-FU push on 10/31/17. In January 2020 patient was changed to every 3-week cycle at cycle #99 ([place] #91) and then changed to every 4-week interval in May 2020.  It was at that time there was noted to have growth of disease within the liver and was seen by Dr. West, surgeon.  Patient did undergo surgery in hopes of resection of the tumor but it was felt to be too close to the hilum and therefore she did undergo ablation instead on 5/28/20.  She has been back on single agent 5-FU at every 2-week interval, restarted on 6/16/20. Last CT 10/06/20 showed the tumor ablation cavities within the right lobe of the liver have decreased in size since previous examination.  There is a 1.5 x 3.1 cm focal area of enhancement adjacent to the ablation cavity that appears to have increased concerning for possible tumor recurrence.  Based on that finding she was sent for an MRI for further evaluation on 10/8/2020 which confirmed that the appearance of the masses noted in the liver did not  have an appearance for suspicious finding for recurrent metastatic disease.  Patient continued with MRI follow up imaging every 3 months, most recent on 4/5/21 showed stable appearance of the liver with distortion of the right lobe consistent with prior partial hepatectomy and ablation.  No evidence of recurrent tumor.  Enhancing soft tissue nodule in the right cardiophrenic angle again seen and unchanged consistent with adenopathy, measuring 17 mm in size. Patient does follow-up with pulmonary scheduled May 2021.  Also discussed with Dr. Steele and will want repeat CT chest in 3 months from last, which would be beginning of June.  Did discuss with pulmonary provider who patient is seeing in 2 weeks recommendations.     3/9/21 patient had experienced significant fatigue and shortness of breath.  This had progressively worsened and she was also seen by cardiology who recommended patient be admitted to the hospital for further cardiac work-up.  Echocardiogram completed which showed an ejection fraction of 65%.  CT chest with contrast completed as well which is stable.  Patient was discharged home with no conclusion as to her significant shortness of breath and fatigue.  We did hold cycle 126 ([place] 118) due to her clinical status at that time.     CT chest completed on 3/9/2021 noted no consolidation.  There was a 1.7 x 2.6 cm nodule opacity in the right lung base which appears to crossover from the liver lesion.  This was previously seen on prior images as well.  The opacity has changed in size, suspected to be related to the liver ablation just adjacent to this location.  The partially visualized ablated cavity in the hepatic dome was noted as well.      Interval History  Patient clinically appears to be fairly stable.  She does note a cough with sputum production that she describes as dark green initially but is resolving.  She stated that this started approximately 10 days ago with some shortness of breath but  "states the shortness of breath has not significantly worsened.  Her  was sick as well.  She does believe that she is feeling much better as of today with decreased in her sputum production.  She did sleep often the last week as well as increased her fluid intake.  She does continue to have some fatigue but it is tolerable.  She does continue to have the standard side effects post treatment.  She tends to have significant chest pain that lasts approximately 24 to 48 hours post pump disconnect which is controlled with oxycodone.  She does have some underlying nausea as well after chemo which is resolved with antiemetics.  Output via her ostomy is quite stable and she takes Imodium as needed.  She does continue to have dry skin and erythema noted to the hands from her 5-FU treatment.    Allergies   Allergen Reactions   • Oxaliplatin Anaphylaxis   • Codeine      \"gets drunk\"   • Pcn [Penicillins] Itching     itching   • Sulfa Drugs Itching     itching   • Tape Rash     PAPER TAPE OK     Current Outpatient Medications on File Prior to Visit   Medication Sig Dispense Refill   • albuterol 108 (90 Base) MCG/ACT Aero Soln inhalation aerosol Inhale 2 Puffs every 6 hours as needed for Shortness of Breath. 1 Each 11   • rivaroxaban (XARELTO) 10 MG Tab tablet Take 1 Tab by mouth every day. 30 Tab 5   • furosemide (LASIX) 40 MG Tab      • potassium chloride ER (KLOR-CON) 10 MEQ tablet Take 1 Tab by mouth every day. 100 Tab 3   • ondansetron (ZOFRAN) 4 MG Tab tablet Take 1 Tab by mouth every four hours as needed. 30 Tab 5   • lidocaine-prilocaine (EMLA) 2.5-2.5 % Cream Apply to port 1 hour prior to access of port and cover with plastic wrap. 30 g 3   • Tiotropium Bromide-Olodaterol (STIOLTO RESPIMAT) 2.5-2.5 MCG/ACT Aero Soln Take 2 Puffs by mouth every day. 1 Inhaler 11   • metronidazole (METROCREAM) 0.75 % cream Apply 1 Each to affected area(s) 2 times a day.     • cyanocobalamin (VITAMIN B-12) 500 MCG Tab Take 1,000 mcg " "by mouth every day.     • Multiple Vitamins-Minerals (CENTRUM SILVER PO) Take 1 Tab by mouth every day.     • Cholecalciferol (VITAMIN D3) 400 UNITS CAPS Take 1 Cap by mouth every day.     • loratadine (CLARITIN) 10 MG TABS Take 10 mg by mouth every day. Indications: Hayfever       Current Facility-Administered Medications on File Prior to Visit   Medication Dose Route Frequency Provider Last Rate Last Admin   • dexamethasone (DECADRON) injection 8 mg  8 mg Intravenous Once Lucho Steele M.D.       • fluorouracil (ADRUCIL) 3,435 mg in CADD Cassette/Bag Chemo infusion (for use in CADD PUMP)  1,920 mg/m2 Intravenous Once Lucho Steele M.D.               Review of Systems   Constitutional: Positive for malaise/fatigue. Negative for chills, diaphoresis, fever and weight loss.   HENT: Negative for congestion and sore throat.    Respiratory: Positive for cough, sputum production (dark green sputum which appears to be slightly improving ) and shortness of breath (stable not worsening). Negative for hemoptysis and wheezing.    Cardiovascular: Negative for chest pain and palpitations.   Gastrointestinal: Negative for constipation, diarrhea, nausea and vomiting.        Imodium as needed for output via ostomy   Genitourinary: Negative for dysuria and hematuria.   Musculoskeletal: Negative for myalgias.   Skin:        Erythema to hands and feet from chemo    Neurological: Negative for dizziness, tingling and headaches.          Objective:     /60   Pulse (!) 114   Temp 37.1 °C (98.8 °F) (Temporal)   Resp 18   Ht 1.664 m (5' 5.5\")   Wt 70.9 kg (156 lb 6.7 oz)   LMP  (LMP Unknown)   SpO2 97%   BMI 25.63 kg/m²      Physical Exam  Vitals reviewed.   Constitutional:       General: She is not in acute distress.     Appearance: Normal appearance. She is not diaphoretic.   HENT:      Head: Normocephalic and atraumatic.      Mouth/Throat:      Mouth: Mucous membranes are dry.      Pharynx: Oropharynx is clear. No " oropharyngeal exudate or posterior oropharyngeal erythema.   Cardiovascular:      Rate and Rhythm: Regular rhythm. Tachycardia present.      Pulses: Normal pulses.      Heart sounds: Normal heart sounds. No murmur. No friction rub. No gallop.    Pulmonary:      Effort: Pulmonary effort is normal. No respiratory distress.      Breath sounds: Normal breath sounds. No wheezing.      Comments: Clear throughout  Abdominal:      General: Bowel sounds are normal. There is no distension.      Palpations: Abdomen is soft.      Tenderness: There is no abdominal tenderness.   Musculoskeletal:         General: No swelling or tenderness. Normal range of motion.   Skin:     General: Skin is warm and dry.      Findings: Erythema (hands from chemo) present.   Neurological:      Mental Status: She is alert and oriented to person, place, and time.   Psychiatric:         Mood and Affect: Mood normal.         Behavior: Behavior normal.       Results for KANG HALL (MRN [id number])    Ref. Range 5/4/2021 07:10   WBC Latest Ref Range: 4.8 - 10.8 K/uL 10.2   RBC Latest Ref Range: 4.20 - 5.40 M/uL 4.44   Hemoglobin Latest Ref Range: 12.0 - 16.0 g/dL 11.2 (L)   Hematocrit Latest Ref Range: 37.0 - 47.0 % 37.8   MCV Latest Ref Range: 81.4 - 97.8 fL 85.1   MCH Latest Ref Range: 27.0 - 33.0 pg 25.2 (L)   MCHC Latest Ref Range: 33.6 - 35.0 g/dL 29.6 (L)   RDW Latest Ref Range: 35.9 - 50.0 fL 56.6 (H)   Platelet Count Latest Ref Range: 164 - 446 K/uL 420   MPV Latest Ref Range: 9.0 - 12.9 fL 10.2   Neutrophils-Polys Latest Ref Range: 44.00 - 72.00 % 77.30 (H)   Neutrophils (Absolute) Latest Ref Range: 2.00 - 7.15 K/uL 7.92 (H)   Lymphocytes Latest Ref Range: 22.00 - 41.00 % 11.60 (L)   Lymphs (Absolute) Latest Ref Range: 1.00 - 4.80 K/uL 1.19   Monocytes Latest Ref Range: 0.00 - 13.40 % 7.70   Monos (Absolute) Latest Ref Range: 0.00 - 0.85 K/uL 0.79   Eosinophils Latest Ref Range: 0.00 - 6.90 % 2.00   Eos (Absolute) Latest Ref Range: 0.00 -  0.51 K/uL 0.20   Basophils Latest Ref Range: 0.00 - 1.80 % 0.50   Baso (Absolute) Latest Ref Range: 0.00 - 0.12 K/uL 0.05   Immature Granulocytes Latest Ref Range: 0.00 - 0.90 % 0.90   Immature Granulocytes (abs) Latest Ref Range: 0.00 - 0.11 K/uL 0.09   Nucleated RBC Latest Units: /100 WBC 0.00   NRBC (Absolute) Latest Units: K/uL 0.00   Plt Estimation Unknown Normal   RBC Morphology Unknown Present   Hypochromia Unknown 1+   Anisocytosis Unknown 1+   Ovalocytes Unknown 1+   Comments-Diff Unknown see below   Peripheral Smear Review Unknown see below   Sodium Latest Ref Range: 135 - 145 mmol/L 132 (L)   Potassium Latest Ref Range: 3.6 - 5.5 mmol/L 4.0   Chloride Latest Ref Range: 96 - 112 mmol/L 98   Co2 Latest Ref Range: 20 - 33 mmol/L 21   Anion Gap Latest Ref Range: 7.0 - 16.0  13.0   Glucose Latest Ref Range: 65 - 99 mg/dL 182 (H)   Bun Latest Ref Range: 8 - 22 mg/dL 15   Creatinine Latest Ref Range: 0.50 - 1.40 mg/dL 1.06   GFR If  Latest Ref Range: >60 mL/min/1.73 m 2 >60   GFR If Non  Latest Ref Range: >60 mL/min/1.73 m 2 50 (A)   Calcium Latest Ref Range: 8.5 - 10.5 mg/dL 9.4   AST(SGOT) Latest Ref Range: 12 - 45 U/L 28   ALT(SGPT) Latest Ref Range: 2 - 50 U/L 17   Alkaline Phosphatase Latest Ref Range: 30 - 99 U/L 195 (H)   Total Bilirubin Latest Ref Range: 0.1 - 1.5 mg/dL 0.7   Albumin Latest Ref Range: 3.2 - 4.9 g/dL 3.5   Total Protein Latest Ref Range: 6.0 - 8.2 g/dL 7.0   Globulin Latest Ref Range: 1.9 - 3.5 g/dL 3.5   A-G Ratio Latest Units: g/dL 1.0   Carcinoembryonic Antigen Latest Ref Range: 0.0 - 3.0 ng/mL 2.0          Assessment/Plan:        1. Malignant neoplasm of rectum (HCC)     2. Secondary malignant neoplasm of liver (HCC)     3. Cough     4. History of pulmonary embolus (PE)     5. Current use of long term anticoagulation     6. Elevated alkaline phosphatase level         1.  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   She is scheduled to receive cycle #129 ([place] plan #121) today.  I did personally review her CBC, CMP and CEA and labs are appropriate to proceed with treatment as planned.  Patient clinically is very stable as well.  Patient has noticed improvement in her overall clinical status post treatment with the addition of 500 cc pre and post treatment and will continue with the hydration due to improvement in her clinical status.     2.  Most recent MRI of the abdomen is stable.  Per medical record, surgeon, Dr. West has plan to repeat an MRI in 6 months, October 2021.     3.  Continuing to monitor patient's alkaline phosphatase which has trended down this week to 195 (previous 202).  We will continue to monitor.     4.  Patient with history of pulmonary embolism, currently on low-dose Xarelto and tolerating it well.  Patient to continue as prescribed.     5.  Patient noted to have cough with sputum production the last 7 to 10 days.  She does state that the cough and sputum production is improving.  No fevers or chills associated.  No need for any interventions at this time, however I did discuss with patient should she note worsening of her clinical symptoms she is to contact office as may need to proceed with antibiotic.    Patient is due to follow-up with pulmonary in approximately 2 weeks.  Discussion with pulmonary as well as Dr. Steele that we will proceed with a follow-up CT in approximately 3 months from last which will be beginning of June.  Discussed with the patient as well and pulmonary has agreed to arrange and schedule follow-up CT chest.    6.  Patient to follow-up in the clinic in 2 weeks or sooner if needed.

## 2021-05-06 ENCOUNTER — OUTPATIENT INFUSION SERVICES (OUTPATIENT)
Dept: ONCOLOGY | Facility: MEDICAL CENTER | Age: 81
End: 2021-05-06
Attending: INTERNAL MEDICINE
Payer: MEDICARE

## 2021-05-06 VITALS
WEIGHT: 156.53 LBS | OXYGEN SATURATION: 97 % | BODY MASS INDEX: 26.72 KG/M2 | HEART RATE: 104 BPM | HEIGHT: 64 IN | TEMPERATURE: 98 F | SYSTOLIC BLOOD PRESSURE: 120 MMHG | DIASTOLIC BLOOD PRESSURE: 63 MMHG | RESPIRATION RATE: 18 BRPM

## 2021-05-06 DIAGNOSIS — C20 MALIGNANT NEOPLASM OF RECTUM (HCC): ICD-10-CM

## 2021-05-06 DIAGNOSIS — R94.4 DECREASED GFR: ICD-10-CM

## 2021-05-06 PROCEDURE — 700111 HCHG RX REV CODE 636 W/ 250 OVERRIDE (IP): Performed by: INTERNAL MEDICINE

## 2021-05-06 PROCEDURE — 700105 HCHG RX REV CODE 258: Performed by: INTERNAL MEDICINE

## 2021-05-06 PROCEDURE — 96360 HYDRATION IV INFUSION INIT: CPT

## 2021-05-06 RX ORDER — SODIUM CHLORIDE 9 MG/ML
500 INJECTION, SOLUTION INTRAVENOUS ONCE
Status: COMPLETED | OUTPATIENT
Start: 2021-05-06 | End: 2021-05-06

## 2021-05-06 RX ORDER — HEPARIN SODIUM (PORCINE) LOCK FLUSH IV SOLN 100 UNIT/ML 100 UNIT/ML
500 SOLUTION INTRAVENOUS PRN
Status: DISCONTINUED | OUTPATIENT
Start: 2021-05-06 | End: 2021-05-06 | Stop reason: HOSPADM

## 2021-05-06 RX ADMIN — SODIUM CHLORIDE 500 ML: 9 INJECTION, SOLUTION INTRAVENOUS at 10:51

## 2021-05-06 RX ADMIN — HEPARIN 500 UNITS: 100 SYRINGE at 12:06

## 2021-05-06 ASSESSMENT — FIBROSIS 4 INDEX: FIB4 SCORE: 1.309692375196198057

## 2021-05-06 NOTE — PROGRESS NOTES
Pt returns to IS for 5FU CADD pump disconnect and hydration.  Pt reports fatigue and mild nausea today.  Pt took Zofran this morning.  5FU CADD pump volume is at 0ml.  CADD pump disconnected from pt.  Port flushed with NS and brisk blood return observed.  Hydration given over 1 hour.  Port flushed with NS and heparin locked.  [place]ade needle removed intact and site covered with gauze.  Pt is aware of next appt time. Pt dc home to self care.

## 2021-05-12 ENCOUNTER — TELEPHONE (OUTPATIENT)
Dept: CARDIOLOGY | Facility: MEDICAL CENTER | Age: 81
End: 2021-05-12

## 2021-05-12 DIAGNOSIS — R60.0 LOCALIZED EDEMA: ICD-10-CM

## 2021-05-12 DIAGNOSIS — Z86.711 HISTORY OF PULMONARY EMBOLISM: ICD-10-CM

## 2021-05-12 DIAGNOSIS — D64.9 ANEMIA, UNSPECIFIED TYPE: ICD-10-CM

## 2021-05-12 RX ORDER — POTASSIUM CHLORIDE 750 MG/1
10 TABLET, FILM COATED, EXTENDED RELEASE ORAL DAILY
Qty: 100 TABLET | Refills: 3 | Status: ON HOLD | OUTPATIENT
Start: 2021-05-12 | End: 2021-10-21

## 2021-05-12 NOTE — TELEPHONE ENCOUNTER
RO    Patient called and stated that [facility] is requesting a new RX for the Potassium. The refills did not transfer over per patient. Please send new RX to pharmacy on file. She also stated she needs the tablets and not the capsules.    Thank you,    Cassie GOODMAN

## 2021-05-12 NOTE — TELEPHONE ENCOUNTER
RO    Pt called stating she last saw RO on March 9th and RO had Pt go to the hospital. Pt wants to know when RO would like to see her so she can make an appt. Please call Pt back at [phone].    Thank you

## 2021-05-17 NOTE — PROGRESS NOTES
"Pharmacy Chemotherapy Verification  Patient Name: Karlene Walters      Dx:  Met Colon CA      Protocol: 5-FU + Leucovorin       Leucovorin 400 mg/m² IV over 2 hours on Day 1, followed by  Fluorouracil 400 mg/m²  IVP on Day 1, followed by                --7/5/16: Dose reduced by 20% to 320 mg/m² starting with Cycle 18 ([place] #11)  due to neutropenia                -- 10/31/17: delete Leucovorin and 5-FU push d/t neutropenia per Dr. Hardy   Fluorouracil 2400 mg/m² continuous infusion over 46-48 hours                --7/5/16: Infusion Dose reduced by 20% starting with Cycle 18 ([place] #11)  due to neutropenia               --20% reduction (1920 mg/m²) to be continued starting C28 per C Alsop APRN    14 day cycles until disease progression or unacceptable toxicity  *Dosing Reference*     NCCN Guidelines for Colon Cancer. V.2.2015.  Jay T, et al. Eur J Cancer.1999;35(9):1343-7.     Allergies:  Oxaliplatin; Codeine; Pcn [penicillins]; Sulfa drugs; and Tape     /63 Comment: transfered over from this morning.  Pulse (!) 59 Comment: transfered over from this morning.  Temp 36.2 °C (97.2 °F) (Temporal) Comment: transfered over from this morning. Comment (Src): transfered over from this morning.  Resp 18 Comment: transfered over from this morning.  Ht 1.63 m (5' 4.17\") Comment: transfered over from this morning.  Wt 68.5 kg (151 lb 0.2 oz) Comment: transfered over from this morning.  LMP  (LMP Unknown)   SpO2 100% Comment: transfered over from this morning.  BMI 25.78 kg/m²  Body surface area is 1.76 meters squared.      Labs: 5/18/21   Meet treatment parameters     Drug Order   (Drug name, dose, route, IV Fluid & volume, frequency, number of doses) Cycle 130 ([place] cycle 122)    Previous treatment: 5/4/21      Medication = Fluorouracil (5-FU)  Base Dose = 1920 mg/m²  Calc Dose: Base Dose x 1.76 m² = 3379.2 mg  Final Dose = 3380 mg   Route = CIVI   Conc = 50 mg/mL  Fluid & Volume = 67.6 mL (+ 3 mL " overfill = 70.6 mL)  Admin Duration = Over 46 hours @ 1.5 mL/hour    Via CADD pump for home infusion       Okay to treat with final dose

## 2021-05-18 ENCOUNTER — OFFICE VISIT (OUTPATIENT)
Dept: HEMATOLOGY ONCOLOGY | Facility: MEDICAL CENTER | Age: 81
End: 2021-05-18
Payer: MEDICARE

## 2021-05-18 ENCOUNTER — OUTPATIENT INFUSION SERVICES (OUTPATIENT)
Dept: ONCOLOGY | Facility: MEDICAL CENTER | Age: 81
End: 2021-05-18
Attending: INTERNAL MEDICINE
Payer: MEDICARE

## 2021-05-18 ENCOUNTER — HOSPITAL ENCOUNTER (OUTPATIENT)
Dept: RADIOLOGY | Facility: MEDICAL CENTER | Age: 81
End: 2021-05-18
Attending: INTERNAL MEDICINE
Payer: MEDICARE

## 2021-05-18 ENCOUNTER — HOSPITAL ENCOUNTER (OUTPATIENT)
Dept: RADIOLOGY | Facility: MEDICAL CENTER | Age: 81
End: 2021-05-18
Attending: NURSE PRACTITIONER
Payer: MEDICARE

## 2021-05-18 VITALS
HEART RATE: 59 BPM | TEMPERATURE: 97.2 F | RESPIRATION RATE: 18 BRPM | BODY MASS INDEX: 25.78 KG/M2 | HEIGHT: 64 IN | OXYGEN SATURATION: 100 % | WEIGHT: 151.01 LBS | SYSTOLIC BLOOD PRESSURE: 136 MMHG | DIASTOLIC BLOOD PRESSURE: 63 MMHG

## 2021-05-18 VITALS
OXYGEN SATURATION: 100 % | RESPIRATION RATE: 18 BRPM | TEMPERATURE: 97.2 F | WEIGHT: 151.01 LBS | HEIGHT: 64 IN | SYSTOLIC BLOOD PRESSURE: 136 MMHG | BODY MASS INDEX: 25.78 KG/M2 | DIASTOLIC BLOOD PRESSURE: 63 MMHG | HEART RATE: 59 BPM

## 2021-05-18 VITALS
HEIGHT: 66 IN | RESPIRATION RATE: 18 BRPM | OXYGEN SATURATION: 98 % | HEART RATE: 107 BPM | SYSTOLIC BLOOD PRESSURE: 146 MMHG | WEIGHT: 151.13 LBS | BODY MASS INDEX: 24.29 KG/M2 | DIASTOLIC BLOOD PRESSURE: 70 MMHG | TEMPERATURE: 97.2 F

## 2021-05-18 DIAGNOSIS — C78.7 SECONDARY MALIGNANT NEOPLASM OF LIVER (HCC): ICD-10-CM

## 2021-05-18 DIAGNOSIS — C20 MALIGNANT NEOPLASM OF RECTUM (HCC): ICD-10-CM

## 2021-05-18 DIAGNOSIS — R26.89 BALANCE PROBLEM: ICD-10-CM

## 2021-05-18 DIAGNOSIS — Z86.711 HISTORY OF PULMONARY EMBOLUS (PE): ICD-10-CM

## 2021-05-18 DIAGNOSIS — R05.9 COUGH: ICD-10-CM

## 2021-05-18 DIAGNOSIS — R06.02 SOB (SHORTNESS OF BREATH): ICD-10-CM

## 2021-05-18 DIAGNOSIS — Z79.01 CURRENT USE OF LONG TERM ANTICOAGULATION: ICD-10-CM

## 2021-05-18 DIAGNOSIS — R06.02 SHORTNESS OF BREATH: ICD-10-CM

## 2021-05-18 DIAGNOSIS — R42 DIZZY: ICD-10-CM

## 2021-05-18 DIAGNOSIS — R53.0 NEOPLASTIC MALIGNANT RELATED FATIGUE: ICD-10-CM

## 2021-05-18 DIAGNOSIS — R94.4 DECREASED GFR: ICD-10-CM

## 2021-05-18 LAB
ALBUMIN SERPL BCP-MCNC: 3.6 G/DL (ref 3.2–4.9)
ALBUMIN/GLOB SERPL: 1 G/DL
ALP SERPL-CCNC: 251 U/L (ref 30–99)
ALT SERPL-CCNC: 23 U/L (ref 2–50)
ANION GAP SERPL CALC-SCNC: 12 MMOL/L (ref 7–16)
AST SERPL-CCNC: 18 U/L (ref 12–45)
BASOPHILS # BLD AUTO: 0.5 % (ref 0–1.8)
BASOPHILS # BLD: 0.05 K/UL (ref 0–0.12)
BILIRUB SERPL-MCNC: 0.6 MG/DL (ref 0.1–1.5)
BUN SERPL-MCNC: 20 MG/DL (ref 8–22)
CALCIUM SERPL-MCNC: 9.8 MG/DL (ref 8.5–10.5)
CEA SERPL-MCNC: 2.3 NG/ML (ref 0–3)
CHLORIDE SERPL-SCNC: 101 MMOL/L (ref 96–112)
CO2 SERPL-SCNC: 20 MMOL/L (ref 20–33)
CREAT SERPL-MCNC: 1.07 MG/DL (ref 0.5–1.4)
EOSINOPHIL # BLD AUTO: 0.21 K/UL (ref 0–0.51)
EOSINOPHIL NFR BLD: 2.1 % (ref 0–6.9)
ERYTHROCYTE [DISTWIDTH] IN BLOOD BY AUTOMATED COUNT: 57.2 FL (ref 35.9–50)
GLOBULIN SER CALC-MCNC: 3.5 G/DL (ref 1.9–3.5)
GLUCOSE SERPL-MCNC: 143 MG/DL (ref 65–99)
HCT VFR BLD AUTO: 38 % (ref 37–47)
HGB BLD-MCNC: 11.4 G/DL (ref 12–16)
IMM GRANULOCYTES # BLD AUTO: 0.08 K/UL (ref 0–0.11)
IMM GRANULOCYTES NFR BLD AUTO: 0.8 % (ref 0–0.9)
LYMPHOCYTES # BLD AUTO: 1.14 K/UL (ref 1–4.8)
LYMPHOCYTES NFR BLD: 11.5 % (ref 22–41)
MCH RBC QN AUTO: 25.2 PG (ref 27–33)
MCHC RBC AUTO-ENTMCNC: 30 G/DL (ref 33.6–35)
MCV RBC AUTO: 83.9 FL (ref 81.4–97.8)
MONOCYTES # BLD AUTO: 0.81 K/UL (ref 0–0.85)
MONOCYTES NFR BLD AUTO: 8.1 % (ref 0–13.4)
NEUTROPHILS # BLD AUTO: 7.66 K/UL (ref 2–7.15)
NEUTROPHILS NFR BLD: 77 % (ref 44–72)
NRBC # BLD AUTO: 0 K/UL
NRBC BLD-RTO: 0 /100 WBC
PLATELET # BLD AUTO: 372 K/UL (ref 164–446)
PMV BLD AUTO: 10.5 FL (ref 9–12.9)
POTASSIUM SERPL-SCNC: 3.8 MMOL/L (ref 3.6–5.5)
PROT SERPL-MCNC: 7.1 G/DL (ref 6–8.2)
RBC # BLD AUTO: 4.53 M/UL (ref 4.2–5.4)
SODIUM SERPL-SCNC: 133 MMOL/L (ref 135–145)
WBC # BLD AUTO: 10 K/UL (ref 4.8–10.8)

## 2021-05-18 PROCEDURE — 80053 COMPREHEN METABOLIC PANEL: CPT

## 2021-05-18 PROCEDURE — 71260 CT THORAX DX C+: CPT

## 2021-05-18 PROCEDURE — G0498 CHEMO EXTEND IV INFUS W/PUMP: HCPCS

## 2021-05-18 PROCEDURE — 96375 TX/PRO/DX INJ NEW DRUG ADDON: CPT

## 2021-05-18 PROCEDURE — 85025 COMPLETE CBC W/AUTO DIFF WBC: CPT

## 2021-05-18 PROCEDURE — 700117 HCHG RX CONTRAST REV CODE 255: Performed by: NURSE PRACTITIONER

## 2021-05-18 PROCEDURE — 82378 CARCINOEMBRYONIC ANTIGEN: CPT

## 2021-05-18 PROCEDURE — 96361 HYDRATE IV INFUSION ADD-ON: CPT

## 2021-05-18 PROCEDURE — 96409 CHEMO IV PUSH SNGL DRUG: CPT

## 2021-05-18 PROCEDURE — 96374 THER/PROPH/DIAG INJ IV PUSH: CPT

## 2021-05-18 PROCEDURE — A4212 NON CORING NEEDLE OR STYLET: HCPCS

## 2021-05-18 PROCEDURE — 700105 HCHG RX REV CODE 258: Performed by: INTERNAL MEDICINE

## 2021-05-18 PROCEDURE — 700111 HCHG RX REV CODE 636 W/ 250 OVERRIDE (IP)

## 2021-05-18 PROCEDURE — 99214 OFFICE O/P EST MOD 30 MIN: CPT | Performed by: NURSE PRACTITIONER

## 2021-05-18 PROCEDURE — 700111 HCHG RX REV CODE 636 W/ 250 OVERRIDE (IP): Performed by: INTERNAL MEDICINE

## 2021-05-18 RX ORDER — HEPARIN SODIUM (PORCINE) LOCK FLUSH IV SOLN 100 UNIT/ML 100 UNIT/ML
500 SOLUTION INTRAVENOUS PRN
Status: DISCONTINUED | OUTPATIENT
Start: 2021-05-18 | End: 2021-05-18 | Stop reason: HOSPADM

## 2021-05-18 RX ORDER — DEXAMETHASONE SODIUM PHOSPHATE 4 MG/ML
8 INJECTION, SOLUTION INTRA-ARTICULAR; INTRALESIONAL; INTRAMUSCULAR; INTRAVENOUS; SOFT TISSUE ONCE
Status: COMPLETED | OUTPATIENT
Start: 2021-05-18 | End: 2021-05-18

## 2021-05-18 RX ORDER — SODIUM CHLORIDE 9 MG/ML
500 INJECTION, SOLUTION INTRAVENOUS ONCE
Status: COMPLETED | OUTPATIENT
Start: 2021-05-18 | End: 2021-05-18

## 2021-05-18 RX ORDER — HEPARIN SODIUM (PORCINE) LOCK FLUSH IV SOLN 100 UNIT/ML 100 UNIT/ML
SOLUTION INTRAVENOUS
Status: COMPLETED
Start: 2021-05-18 | End: 2021-05-18

## 2021-05-18 RX ADMIN — HEPARIN 500 UNITS: 100 SYRINGE at 07:30

## 2021-05-18 RX ADMIN — FLUOROURACIL 3380 MG: 50 INJECTION, SOLUTION INTRAVENOUS at 12:03

## 2021-05-18 RX ADMIN — DEXAMETHASONE SODIUM PHOSPHATE 8 MG: 4 INJECTION, SOLUTION INTRA-ARTICULAR; INTRALESIONAL; INTRAMUSCULAR; INTRAVENOUS; SOFT TISSUE at 10:17

## 2021-05-18 RX ADMIN — HEPARIN SODIUM (PORCINE) LOCK FLUSH IV SOLN 100 UNIT/ML 500 UNITS: 100 SOLUTION at 07:30

## 2021-05-18 RX ADMIN — IOHEXOL 75 ML: 350 INJECTION, SOLUTION INTRAVENOUS at 11:57

## 2021-05-18 RX ADMIN — SODIUM CHLORIDE 500 ML: 9 INJECTION, SOLUTION INTRAVENOUS at 09:24

## 2021-05-18 ASSESSMENT — PAIN SCALES - GENERAL: PAINLEVEL: NO PAIN

## 2021-05-18 ASSESSMENT — ENCOUNTER SYMPTOMS
FEVER: 0
NAUSEA: 0
CHILLS: 0
CONSTIPATION: 0
SHORTNESS OF BREATH: 1
MYALGIAS: 0
COUGH: 1
PALPITATIONS: 1
VOMITING: 0
WEIGHT LOSS: 1
DIZZINESS: 1
DIARRHEA: 0

## 2021-05-18 ASSESSMENT — FIBROSIS 4 INDEX
FIB4 SCORE: 1.309692375196198057
FIB4 SCORE: 0.82
FIB4 SCORE: 1.309692375196198057

## 2021-05-18 NOTE — PROGRESS NOTES
Subjective:      Karlene Walters is a 81 y.o. female who presents for pre-chemo appt for metastatic colon cancer to liver.         HPI    Patient seen today in follow up for pre-chemo appt for metastatic colon cancer to liver. She presents unaccompanied for today's visit.     Patient seen today in follow up for metastatic colon cancer to liver.  She presents unaccompanied for today's visit.  Patient is scheduled to receive cycle #130 ([place] plan #122) of single agent 5-FU which she continues on every 2-week interval.       Cancer History  Patient with a long history of rectal carcinoma initially diagnosed in 2012.  She was found to have metastatic disease to liver and lung in 2015.  She is status post liver ablation as well as has received Y 90 in the past.  She was on XELOX initially but had allergic reaction to oxaliplatin (last dose 7/28/15) and was switched to single agent 5-FU. Patient initially on 5-FU at 2400 mg/m2 with Leucovorin and 5-FU push starting on 10/27/15, followed by 20% dose reduction to 1920 mg/m2 for cycle 18 (7/5/16), followed by deletion of Leucovorin and 5-FU push on 10/31/17. In January 2020 patient was changed to every 3-week cycle at cycle #99 ([place] #91) and then changed to every 4-week interval in May 2020.  It was at that time there was noted to have growth of disease within the liver and was seen by Dr. West, surgeon.  Patient did undergo surgery in hopes of resection of the tumor but it was felt to be too close to the hilum and therefore she did undergo ablation instead on 5/28/20.  She has been back on single agent 5-FU at every 2-week interval, restarted on 6/16/20. Last CT 10/06/20 showed the tumor ablation cavities within the right lobe of the liver have decreased in size since previous examination.  There is a 1.5 x 3.1 cm focal area of enhancement adjacent to the ablation cavity that appears to have increased concerning for possible tumor recurrence.  Based on that  finding she was sent for an MRI for further evaluation on 10/8/2020 which confirmed that the appearance of the masses noted in the liver did not have an appearance for suspicious finding for recurrent metastatic disease.  Patient continued with MRI follow up imaging every 3 months, most recent on 4/5/21 showed stable appearance of the liver with distortion of the right lobe consistent with prior partial hepatectomy and ablation.  No evidence of recurrent tumor.  Enhancing soft tissue nodule in the right cardiophrenic angle again seen and unchanged consistent with adenopathy, measuring 17 mm in size. Patient does follow-up with pulmonary scheduled May 2021.  Also discussed with Dr. Steele and will want repeat CT chest in 3 months from last, which would be beginning of June.  Did discuss with pulmonary provider who patient is seeing in 2 weeks recommendations.     3/9/21 patient had experienced significant fatigue and shortness of breath.  This had progressively worsened and she was also seen by cardiology who recommended patient be admitted to the hospital for further cardiac work-up.  Echocardiogram completed which showed an ejection fraction of 65%.  CT chest with contrast completed as well which is stable.  Patient was discharged home with no conclusion as to her significant shortness of breath and fatigue.  We did hold cycle 126 ([place] 118) due to her clinical status at that time.     CT chest completed on 3/9/2021 noted no consolidation.  There was a 1.7 x 2.6 cm nodule opacity in the right lung base which appears to crossover from the liver lesion.  This was previously seen on prior images as well.  The opacity has changed in size, suspected to be related to the liver ablation just adjacent to this location.  The partially visualized ablated cavity in the hepatic dome was noted as well.      Interval History  Patient appears to be quite fatigued today.  She has lost approximately 5 pounds in the last 2 weeks.   "She states that food just does not taste good.  She is trying boost but does not like the taste.  She has been experiencing increased shortness of breath and heart palpitations over the past 3 days.  She stated that she had a significant cough last week which caused significant chest pain due to the amount of coughing she was doing.  She stated that the cough has improved in the last few days.  She believes it may be related to [place] is [place] lives.  She also notes that she has the usual sternal pain that she rates 1/10.  That has increased a little bit due to the cough last week.  She tends to get that a few days after chemo but it had been more consistent over the last 2 weeks.  She did state that she was trying to drink a little bit more as well.  She feels significantly fatigued and ambulates with a cane.  She states that she has lost her balance a few times and plans to get a walker in the future.  She has not fallen but stated she has almost fallen a few times.    Patient does see pulmonary tomorrow.  She does state that she continues to take her low-dose Xarelto.  She also spoke with cardiology and is scheduled for a follow-up visit with Dr. Haque next month.     Allergies   Allergen Reactions   • Oxaliplatin Anaphylaxis   • Codeine      \"gets drunk\"   • Pcn [Penicillins] Itching     itching   • Sulfa Drugs Itching     itching   • Tape Rash     PAPER TAPE OK     Current Outpatient Medications on File Prior to Visit   Medication Sig Dispense Refill   • potassium chloride ER (KLOR-CON) 10 MEQ tablet Take 1 tablet by mouth every day. 100 tablet 3   • albuterol 108 (90 Base) MCG/ACT Aero Soln inhalation aerosol Inhale 2 Puffs every 6 hours as needed for Shortness of Breath. 1 Each 11   • rivaroxaban (XARELTO) 10 MG Tab tablet Take 1 Tab by mouth every day. 30 Tab 5   • furosemide (LASIX) 40 MG Tab  (Patient not taking: Reported on 5/18/2021)     • ondansetron (ZOFRAN) 4 MG Tab tablet Take 1 Tab by " mouth every four hours as needed. (Patient not taking: Reported on 5/18/2021) 30 Tab 5   • lidocaine-prilocaine (EMLA) 2.5-2.5 % Cream Apply to port 1 hour prior to access of port and cover with plastic wrap. 30 g 3   • Tiotropium Bromide-Olodaterol (STIOLTO RESPIMAT) 2.5-2.5 MCG/ACT Aero Soln Take 2 Puffs by mouth every day. 1 Inhaler 11   • metronidazole (METROCREAM) 0.75 % cream Apply 1 Each to affected area(s) 2 times a day. (Patient not taking: Reported on 5/18/2021)     • cyanocobalamin (VITAMIN B-12) 500 MCG Tab Take 1,000 mcg by mouth every day.     • Multiple Vitamins-Minerals (CENTRUM SILVER PO) Take 1 Tab by mouth every day.     • Cholecalciferol (VITAMIN D3) 400 UNITS CAPS Take 1 Cap by mouth every day.     • loratadine (CLARITIN) 10 MG TABS Take 10 mg by mouth every day. Indications: Hayfever       Current Facility-Administered Medications on File Prior to Visit   Medication Dose Route Frequency Provider Last Rate Last Admin   • NS infusion 500 mL  500 mL Intravenous Once Lucho Steele M.D.       • dexamethasone (DECADRON) injection 8 mg  8 mg Intravenous Once Lucho Steele M.D.       • fluorouracil (ADRUCIL) 3,380 mg in CADD Cassette/Bag Chemo infusion (for use in CADD PUMP)  1,920 mg/m2 Intravenous Once Lucho Steele M.D.             Review of Systems   Constitutional: Positive for malaise/fatigue and weight loss (5 pounds in the last 2 weeks ). Negative for chills and fever.   Respiratory: Positive for cough (increased this last week but noted more so in [place]) and shortness of breath.    Cardiovascular: Positive for palpitations. Negative for chest pain.   Gastrointestinal: Negative for constipation, diarrhea, nausea and vomiting.        Output via ostomy - she is having loose stool and uses imodium as needed. She is using it 2-3 times a day.    Genitourinary: Negative for dysuria.   Musculoskeletal: Negative for myalgias.   Skin:        Small red lesion on the bottom lip - using Carmex  "which resolves immediately - saw Dermatology and thinks she bites her lip    Neurological: Positive for dizziness (mild especially when getting up too fast - planning to use a walker vs cane). Seizures:    Loss of consciousness:             Objective:     /70   Pulse (!) 107   Temp 36.2 °C (97.2 °F) (Temporal)   Resp 18   Ht 1.676 m (5' 6\")   Wt 68.6 kg (151 lb 2 oz)   LMP  (LMP Unknown)   SpO2 98%   BMI 24.39 kg/m²      Physical Exam  Vitals reviewed.   Constitutional:       Appearance: She is not ill-appearing or diaphoretic.      Comments: Patient does appear to be fatigued today   HENT:      Head: Normocephalic and atraumatic.      Mouth/Throat:      Comments: Small erythema noted bottom lip  Cardiovascular:      Rate and Rhythm: Tachycardia present.      Heart sounds: Normal heart sounds. No murmur heard.   No friction rub. No gallop.    Pulmonary:      Effort: No respiratory distress.      Comments: Clear lung sounds with apparent SOB  Abdominal:      General: Bowel sounds are normal. There is no distension.      Palpations: There is no mass.      Tenderness: There is no abdominal tenderness. There is no guarding.   Musculoskeletal:         General: Normal range of motion.   Skin:     General: Skin is warm and dry.   Neurological:      Mental Status: She is alert and oriented to person, place, and time.   Psychiatric:         Mood and Affect: Mood normal.         Behavior: Behavior normal.       Results for KANG HALL (MRN [id number])    Ref. Range 5/18/2021 07:30   WBC Latest Ref Range: 4.8 - 10.8 K/uL 10.0   RBC Latest Ref Range: 4.20 - 5.40 M/uL 4.53   Hemoglobin Latest Ref Range: 12.0 - 16.0 g/dL 11.4 (L)   Hematocrit Latest Ref Range: 37.0 - 47.0 % 38.0   MCV Latest Ref Range: 81.4 - 97.8 fL 83.9   MCH Latest Ref Range: 27.0 - 33.0 pg 25.2 (L)   MCHC Latest Ref Range: 33.6 - 35.0 g/dL 30.0 (L)   RDW Latest Ref Range: 35.9 - 50.0 fL 57.2 (H)   Platelet Count Latest Ref Range: 164 - 446 " K/uL 372   MPV Latest Ref Range: 9.0 - 12.9 fL 10.5   Neutrophils-Polys Latest Ref Range: 44.00 - 72.00 % 77.00 (H)   Neutrophils (Absolute) Latest Ref Range: 2.00 - 7.15 K/uL 7.66 (H)   Lymphocytes Latest Ref Range: 22.00 - 41.00 % 11.50 (L)   Lymphs (Absolute) Latest Ref Range: 1.00 - 4.80 K/uL 1.14   Monocytes Latest Ref Range: 0.00 - 13.40 % 8.10   Monos (Absolute) Latest Ref Range: 0.00 - 0.85 K/uL 0.81   Eosinophils Latest Ref Range: 0.00 - 6.90 % 2.10   Eos (Absolute) Latest Ref Range: 0.00 - 0.51 K/uL 0.21   Basophils Latest Ref Range: 0.00 - 1.80 % 0.50   Baso (Absolute) Latest Ref Range: 0.00 - 0.12 K/uL 0.05   Immature Granulocytes Latest Ref Range: 0.00 - 0.90 % 0.80   Immature Granulocytes (abs) Latest Ref Range: 0.00 - 0.11 K/uL 0.08   Nucleated RBC Latest Units: /100 WBC 0.00   NRBC (Absolute) Latest Units: K/uL 0.00   Sodium Latest Ref Range: 135 - 145 mmol/L 133 (L)   Potassium Latest Ref Range: 3.6 - 5.5 mmol/L 3.8   Chloride Latest Ref Range: 96 - 112 mmol/L 101   Co2 Latest Ref Range: 20 - 33 mmol/L 20   Anion Gap Latest Ref Range: 7.0 - 16.0  12.0   Glucose Latest Ref Range: 65 - 99 mg/dL 143 (H)   Bun Latest Ref Range: 8 - 22 mg/dL 20   Creatinine Latest Ref Range: 0.50 - 1.40 mg/dL 1.07   GFR If  Latest Ref Range: >60 mL/min/1.73 m 2 60   GFR If Non  Latest Ref Range: >60 mL/min/1.73 m 2 49 (A)   Calcium Latest Ref Range: 8.5 - 10.5 mg/dL 9.8   AST(SGOT) Latest Ref Range: 12 - 45 U/L 18   ALT(SGPT) Latest Ref Range: 2 - 50 U/L 23   Alkaline Phosphatase Latest Ref Range: 30 - 99 U/L 251 (H)   Total Bilirubin Latest Ref Range: 0.1 - 1.5 mg/dL 0.6   Albumin Latest Ref Range: 3.2 - 4.9 g/dL 3.6   Total Protein Latest Ref Range: 6.0 - 8.2 g/dL 7.1   Globulin Latest Ref Range: 1.9 - 3.5 g/dL 3.5   A-G Ratio Latest Units: g/dL 1.0   Carcinoembryonic Antigen Latest Ref Range: 0.0 - 3.0 ng/mL 2.3          Assessment/Plan:     1. Malignant neoplasm of rectum (HCC)   CT-CHEST ([facility]) WITH    MR-BRAIN-WITH & W/O   2. Secondary malignant neoplasm of liver (HCC)  CT-CHEST ([facility]ORAX) WITH    MR-BRAIN-WITH & W/O   3. History of pulmonary embolus (PE)  CT-CHEST (THORAX) WITH   4. Current use of long term anticoagulation     5. Neoplastic malignant related fatigue     6. SOB (shortness of breath)  CT-CHEST (THORAX) WITH   7. Cough  CT-CHEST (THORAX) WITH   8. Dizzy  MR-BRAIN-WITH & W/O   9. Balance problem  MR-BRAIN-WITH & W/O   10. Shortness of breath  CT-CHEST (THORAX) WITH       1.  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  She is scheduled to receive cycle #130 ([place] plan #122) today.  I did personally review her CBC, CMP and CEA and labs are appropriate to proceed with treatment as planned.  Patient clinically is very stable as well.  Patient has noticed improvement in her overall clinical status post treatment with the addition of 500 cc pre and post treatment and will continue with the hydration due to improvement in her clinical status.  I did discuss with Dr. Steele patient's current clinical status today, and he did agree to proceed with chemotherapy as planned.    Patient has had her alkaline phosphatase waxing and waning over the last few months.  It has trended up this time to 251.  Her CEA also continues to trend up, now at 2.3.  I did review the labs with the patient today.     2.  Most recent MRI of the abdomen is stable.  Per medical record, surgeon, Dr. West has plan to repeat an MRI in 6 months, October 2021.    3.  Patient with significant shortness of breath and fatigue today.  She stated she had a dry cough all last week but does appear to be slightly improving.  However based on her clinical status and how she looks with the fatigue I will go ahead and proceed with a stat CT chest with contrast which she will complete today.  Patient is scheduled to follow-up with pulmonary tomorrow and will appreciate their  recommendations as well.    4.  She does have a history of pulmonary embolism many years ago and has been on long-term use of Xarelto at a low dose.    5.  Patient also with fatigue and dizziness as well as some balance problems.  This may be related to the fatigue when ambulating however based on these clinical symptoms I will go ahead and proceed with an MRI brain as well which she is scheduled for this coming Thursday, 5/20/2021.  Patient plans to use a front wheel walker for more stability been the cane that she is using at this time.    6.  Will await results of the upcoming imaging scans, CT and MRI brain.  However at this time patient is scheduled for a follow-up visit in 2 weeks or sooner if needed.        Please note that this dictation was created using voice recognition software. I have made every reasonable attempt to correct obvious errors, but I expect that there are errors of grammar and possibly content that I did not discover before finalizing the note.

## 2021-05-18 NOTE — PROGRESS NOTES
Pt ambulatory using her cane to [facility] for pre-chemo lab draw.  Pt w/ no s/s of infection, pt reporting strong cough w/ clear phlegm.  Pt reports APRN is aware and they are monitoring.  Pt reports she understands to report any fevers or change in phlegm to yellow or green.  Pt PORT accessed using sterile technique, brisk blood return noted, labs drawn per orders, flushed and HL per protocol.  Pt left on foot to MD appt in NAD, confirmed pt to return at 0800 for chemo appt.

## 2021-05-18 NOTE — PROGRESS NOTES
Chemotherapy Verification - PRIMARY RN      Height = 163 cm  Weight = 68.5 kg  BSA = 1.76 m^2       Medication: Fluorouracil CADD pump  Dose: 1,920 mg/m^2  Calculated Dose: 3,379.2 mg                             (In mg/m2, AUC, mg/kg)       I confirm this process was performed independently with the BSA and all final chemotherapy dosing calculations congruent.  Any discrepancies of 10% or greater have been addressed with the chemotherapy pharmacist. The resolution of the discrepancy has been documented in the EPIC progress notes.

## 2021-05-18 NOTE — PROGRESS NOTES
Pt returns to [facility] for 5FU CADD pump connection.  Pt was seen by ANDREW Krishnan prior to this appt.  Spoke with Ella re: plan of care.  Pt is  scheduled for STAT CT of chest with contrast at [facility] imaging at 5pm today.  Per maureen Jaramillo to proceed with treatment today.  Labs meet parameters for treatment.  NATALI port was accessed earlier today for lab draw but, not with power port needle.  Discussed with pt plan for possible IV access for CT contrast since 5FU CADD pump will be connected to her port.  Called VAT to establish ultrasound guided PIV.  PIV placed by VAT to R-FA.  Hydration given via port.  Dexamethasone given.  AZEB Flores able to get appt for CT at [facility] while pt was still in [facility].  Pt escorted to CT by AZEB Flores and returned with Sandra after CT was complete.  5FU CADD pump connected to pt.  5FU CADD pump settings checked by 2 RNs, and CADD pump is functioning properly.  Confirmed appt time for hydration/pump dc with pt.  Pt requests to have port re-accessed with power port needle on 5/20/2021 after CADD pump dc for schedule MRI.  Pt dc home to self care.

## 2021-05-18 NOTE — PROGRESS NOTES
"Pharmacy Chemotherapy Verification  Patient Name: Karlene Walters      Dx:  Met Colon CA      Protocol: 5-FU + Leucovorin  Cycle 130 ([place] cycle 122)    Previous treatment: 4/22/21       Leucovorin 400 mg/m² IV over 2 hours on Day 1, followed by  Fluorouracil 400 mg/m²  IVP on Day 1, followed by                --7/5/16: Dose reduced by 20% to 320 mg/m² starting with Cycle 18 ([place] #11)  due to neutropenia                -- 10/31/17: delete Leucovorin and 5-FU push d/t neutropenia per Dr. Hardy   Fluorouracil 2400 mg/m² continuous infusion over 46-48 hours                --7/5/16: Infusion Dose reduced by 20% starting with Cycle 18 ([place] #11)  due to neutropenia               --20% reduction (1920 mg/m²) to be continued starting C28 per LLOYD Alsop APRN    14 day cycles until disease progression or unacceptable toxicity  *Dosing Reference*     NCCN Guidelines for Colon Cancer. V.2.2015.  Jay T, et al. Eur J Cancer.1999;35(9):1343-7.     Allergies:  Oxaliplatin; Codeine; Pcn [penicillins]; Sulfa drugs; and Tape     /63 Comment: transfered over from this morning.  Pulse (!) 59 Comment: transfered over from this morning.  Temp 36.2 °C (97.2 °F) (Temporal) Comment: transfered over from this morning. Comment (Src): transfered over from this morning.  Resp 18 Comment: transfered over from this morning.  Ht 1.63 m (5' 4.17\") Comment: transfered over from this morning.  Wt 68.5 kg (151 lb 0.2 oz) Comment: transfered over from this morning.  LMP  (LMP Unknown)   SpO2 100% Comment: transfered over from this morning.  BMI 25.78 kg/m²  Body surface area is 1.76 meters squared.      Labs 5/18/21:  ANC~ 7660 Plt = 372k   Hgb = 11.4     SCr = 1.07 mg/dL CrCl ~ 45 mL/min   AST/ALT/ALK = 18/23/251 TBili = 0.6     Fluorouracil (5-FU) 1920 mg/m² x 1.76 m² = 3379.2 mg   <10% difference, okay to treat with final dose = 3380 mg CIVI   Via CADD pump for home infusion over 46 hours @ 1.5 mL/hr    Asha Krishnan, " PharmD

## 2021-05-18 NOTE — PROGRESS NOTES
Chemotherapy Verification - PRIMARY RN      Height = 64.17in  Weight = 68.5kg  BSA = 1.76m2       Medication: Fluorouracil  Dose: 1920mg/m2  Calculated Dose: 3379.2mg                             (In mg/m2, AUC, mg/kg)           I confirm this process was performed independently with the BSA and all final chemotherapy dosing calculations congruent.  Any discrepancies of 10% or greater have been addressed with the chemotherapy pharmacist. The resolution of the discrepancy has been documented in the EPIC progress notes.

## 2021-05-18 NOTE — Clinical Note
Asha - please see my note from today. She is having SOB and cough. CT chest is being done today based on her clinical status. You should have results for tomorrow's appt. Just wanted you to be aware.    Thank you

## 2021-05-19 ENCOUNTER — OFFICE VISIT (OUTPATIENT)
Dept: SLEEP MEDICINE | Facility: MEDICAL CENTER | Age: 81
End: 2021-05-19
Payer: MEDICARE

## 2021-05-19 VITALS
HEIGHT: 64 IN | WEIGHT: 153 LBS | SYSTOLIC BLOOD PRESSURE: 130 MMHG | RESPIRATION RATE: 16 BRPM | OXYGEN SATURATION: 97 % | DIASTOLIC BLOOD PRESSURE: 60 MMHG | HEART RATE: 103 BPM | BODY MASS INDEX: 26.12 KG/M2

## 2021-05-19 DIAGNOSIS — Z86.711 HISTORY OF PULMONARY EMBOLUS (PE): ICD-10-CM

## 2021-05-19 DIAGNOSIS — R06.00 DYSPNEA, UNSPECIFIED TYPE: ICD-10-CM

## 2021-05-19 DIAGNOSIS — C78.7 SECONDARY MALIGNANT NEOPLASM OF LIVER (HCC): ICD-10-CM

## 2021-05-19 DIAGNOSIS — Z01.818 PRE-OP TESTING: ICD-10-CM

## 2021-05-19 DIAGNOSIS — R06.02 SHORTNESS OF BREATH: ICD-10-CM

## 2021-05-19 DIAGNOSIS — J44.9 CHRONIC OBSTRUCTIVE PULMONARY DISEASE, UNSPECIFIED COPD TYPE (HCC): ICD-10-CM

## 2021-05-19 PROCEDURE — 99214 OFFICE O/P EST MOD 30 MIN: CPT | Performed by: NURSE PRACTITIONER

## 2021-05-19 RX ORDER — OMEPRAZOLE 20 MG/1
20 CAPSULE, DELAYED RELEASE ORAL
Qty: 30 CAPSULE | Refills: 1 | Status: SHIPPED | OUTPATIENT
Start: 2021-05-19 | End: 2021-06-21

## 2021-05-19 RX ORDER — TIOTROPIUM BROMIDE AND OLODATEROL 3.124; 2.736 UG/1; UG/1
2 SPRAY, METERED RESPIRATORY (INHALATION) DAILY
Qty: 1 EACH | Refills: 11 | Status: SHIPPED | OUTPATIENT
Start: 2021-05-19 | End: 2021-06-21

## 2021-05-19 RX ORDER — ALBUTEROL SULFATE 90 UG/1
2 AEROSOL, METERED RESPIRATORY (INHALATION) EVERY 6 HOURS PRN
Qty: 1 EACH | Refills: 11 | Status: SHIPPED | OUTPATIENT
Start: 2021-05-19

## 2021-05-19 ASSESSMENT — FIBROSIS 4 INDEX: FIB4 SCORE: 0.82

## 2021-05-19 NOTE — PATIENT INSTRUCTIONS
Start omeprazole 20mg 30 min prior to breakfast daily; check cost over the counter at [facility]    V scan with CXR now

## 2021-05-19 NOTE — PROGRESS NOTES
Chief Complaint   Patient presents with   • Follow-Up     Dyspnea // last seen 12/2/2020    • Results     CT        HPI:  Karlene Walters is a 81 y.o. year old female here today for follow-up on CT scan results.    CT scan of chest with contrast 5/18/21:  1.  Interval increase in size of wedge-shaped opacity in the right lower lobe. This may represent chronic pneumonitis, parenchymal scarring, or metastatic disease.  2.  Stable ablation cavity in the dome of the liver.  3.  Postoperative changes of the right lobe of the liver.  Reviewed with patient and Dr. Ventura; reviewed prior PET scan by Dr. With Dr. Ventura and this was not FDG avid. This has been there on prior scans. Possibility of pulmonary infarct; recommendation is proceeding with VQ scan or bronch. Low suspicion of infection and does not seem like metastesis based on oncology's most recent imaging.     Patient notes ongoing shortness of breath with exertion/walking.  She also has an intermittent cough.  She notes an episode about a month ago with green phlegm for 3 days that resolved.  She notes no significant cough after that.  In the last 1 to 2 weeks her cough has returned with mainly clear phlegm.  She notes benefit from continued Stiolto 2 puffs once daily and albuterol HFA inhaler up to 2 times daily.  Hx of PE and chronically anticoagulated.  Pending MRI of brain due to report of shakiness/imbalance per Oncology.    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 Also followed by Dr. West surgery and has had multiple ablations but not candidate for resection. Currently undergoing chemotherapy.  Serial CT's dictated by oncology.     Hx of COPD per Dr. Warren's prior assessment, but no hx of smoking - most likely chronic obstructive asthma? Query dyspnea r/t to ongoing chemo and hx of PE.  Hx of PE and on Xarelto chronically.      Never smoker.   PFT 7/17/2019 indicates normal findings with  FEV1 2.37 L 114%, FEV1/FVC ratio 82, TLC 95% predicted, DLCO 97% predicted.  No significant bronchodilator response noted.    PFT 12/2/20 notes FVC 2.96L or 112%, FEV1 2.42L or 121%, FEV1/FVC ratio 82, TLC 91% and DLCo 92%; no significant bronchodilator. Reviewed with patient.  Patient remains on Stiolto and albuterol HFA inhaler.    Prior echo indicated normal LV function.   HRCT of chest was performed previously  to rule out ILD especially in light of her history of cancer and chemotherapy. This indicated emphysematous lungs and some areas of basilar atelectasis but no evidence of ILD.   She was started on Stiolto 2 puffs QD and MATEO. She feels her breathing has improved overall.   CNOX 7/30/19 indicated only 3.3min <88% with avg O2 sat 93.7%.  Prior PET scan 5/14/20 noted no FDG updated in pulmonary nodules or hilar, mediastinal or axillary lymph nodes. Uptake only noted in right hepatic dome.  CT abdomen/pelvis 10/5/20 indicates 6.8 mm opacity anterior base right lung base is unchanged. 5.6 mm right anterior epiphrenic lymph node unchanged.         ROS: As per HPI and otherwise negative if not stated.    Past Medical History:   Diagnosis Date   • Adverse effect of anesthesia     elevated BP postop   • Anesthesia    • Arrhythmia     occasional   • Blood clotting disorder (HCC) 08/2015    bilat PE    • Blood transfusion 1957   • Breath shortness     pt reports d/t chemo treatment; no O2; with moderate exertion; no c/o at this time   • Cancer (HCC) 09/2012    rectal cancer,  Liver CA-2015   • CATARACT     removed b/l   • Chemotherapy-induced neutropenia (HCC) 9/28/2016   • Chickenpox     history of   • Colon cancer (HCC)    • Dental disorder     dentures UPPERS AND LOWERS   • Elevated alkaline phosphatase level 11/15/2017   • Emphysema of lung (HCC)     COPD   • Fall    • Portuguese measles     history of   • Heart murmur     as a child   • Hemorrhagic disorder (HCC)     on Xarelto    • High cholesterol    •  Hypercholesteremia    • Hypertension     no meds currently    • Ileostomy in place (HCC)    • Ileostomy in place (HCC)    • Indigestion    • Influenza     history of   • Lightheadedness 9/17/2015   • Mumps     history of   • Obstruction     ileostomy   • Other specified symptom associated with female genital organs     HYSTERECTOMY 1993   • Pneumonia 05/2017    tx'd with antibx   • Pulmonary embolism (HCC)    • 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)    • Renal insufficiency 3/14/2016   • Restless legs 5/6/2020   • Routine general medical examination at a health care facility 3/14/2016    3/14/16   • Seasonal allergies    • Swelling of both ankles     Lasix PRN   • Tonsillitis        Past Surgical History:   Procedure Laterality Date   • HEPATIC ABLATION  5/28/2020    Procedure: ABLATION, LESION, LIVER-TRISEGMENTECTOMY, MICROWAVE ABLATION, INTRA OPERATIVE ULTRA SOUND, SIMPLE RESECTED / REPAIR OF DIAPHRAGM;  Surgeon: Kit West M.D.;  Location: SURGERY [facility];  Service: General   • HEPATIC ABLATION LAPAROSCOPIC  11/15/2018    Procedure: HEPATIC ABLATION LAPAROSCOPIC.- SEGMENT 7/8;  Surgeon: Kit West M.D.;  Location: SURGERY [facility];  Service: General   • COLONOSCOPY WITH BIOPSY  8/23/2015    Procedure: COLONOSCOPY WITH BIOPSY;  Surgeon: Wilbur Glasgow M.D.;  Location: ENDOSCOPY [facility];  Service:    • HEPATIC ABLATION LAPAROSCOPIC  7/6/2015    Procedure: HEPATIC ABLATION LAPAROSCOPIC.;  Surgeon: Kit West M.D.;  Location: SURGERY [facility];  Service:    • CATH PLACEMENT Left 7/6/2015    Procedure: CATH PLACEMENT CEPHALIC POWERPORT;  Surgeon: Kit West M.D.;  Location: SURGERY [facility];  Service:    • FISTULA IN ANO REPAIR  1/28/2015    Performed by Kolton Montgomery M.D. at [facility]   • PERINEAL PROCEDURE  1/28/2015    Performed by Kolton Montgomery M.D. at [facility]   • FISTULA IN ANO  REPAIR  10/15/2014    Performed by Kolton Montgomery M.D. at SURGERY [facility]   • PERINEAL PROCEDURE  10/15/2014    Performed by Kolton Montgomery M.D. at SURGERY [facility]   • IRRIGATION & DEBRIDEMENT GENERAL  2/19/2014    Performed by Kolton Montgomery M.D. at [facility]   • FLAP GRAFT  2/19/2014    Performed by Kolton Montgomery M.D. at SURGERY [facility]   • PERINEAL PROCEDURE  12/18/2013    Performed by Kolton Montgomery M.D. at [facility]   • MYOCUTANEOUS FLAP  12/18/2013    Performed by Kolton Montgomery M.D. at SURGERY [facility]   • IRRIGATION & DEBRIDEMENT GENERAL  10/23/2013    Performed by Kolton Montgomery M.D. at [facility]   • FISTULA IN ANO REPAIR  9/4/2013    Performed by Kolton Montgomery M.D. at SURGERY [facility]   • FLAP ROTATION  9/4/2013    Performed by Kolton Montgomery M.D. at [facility]   • RECOVERY  8/26/2013    Performed by Cath-Recovery Surgery at [facility] SAME DAY [facility]   • BIOPSY GENERAL  7/17/2013    Performed by Kolton Montgomery M.D. at [facility]   • PROCTOSCOPY  7/17/2013    Performed by Kolton Montgomery M.D. at [facility]   • FISTULA IN ANO REPAIR  2/27/2013    Performed by Kolton Montgomery M.D. at SURGERY [facility]   • SIGMOIDOSCOPY  2/27/2013    Performed by Kolton Montgomery M.D. at [facility]   • LAPAROSCOPY  10/8/2012    Performed by Kolton Montgomery M.D. at [facility]   • ILEO LOOP DIVERSION  10/8/2012    Performed by Kolton Montgomery M.D. at [facility]   • COLECTOMY  9/26/2012    Performed by Kolton Montgomery M.D. at [facility]   • COLONOSCOPY FLEX W/ENDO US  9/20/2012    Performed by Harshil Bolton M.D. at [facility]   • OTHER  2009    left eye torn retina repair   • CATARACT EXTRACTION WITH IOL  2004    bilateral   • ABDOMINAL HYSTERECTOMY TOTAL  1983   • CYST EXCISION  1972    ovarian   • COLON RESECTION     • EYE SURGERY      cataracts   •  HYSTERECTOMY LAPAROSCOPY     • PB REMV 2ND CATARACT,CORN-SCLER SECTN     • TONSILLECTOMY         Family History   Problem Relation Age of Onset   • Heart Disease Mother    • Heart Failure Mother    • Hypertension Mother    • Hyperlipidemia Mother    • Cancer Mother    • Cancer Maternal Aunt 60        breast ca   • Lung Disease Brother         COPD/Emphysema/Smoker   • Cancer Brother         prostate cancer   • Alcohol/Drug Brother         Alcohol   • Kidney Disease Father         renal failure       Social History     Socioeconomic History   • Marital status:      Spouse name: Not on file   • Number of children: Not on file   • Years of education: Not on file   • Highest education level: Not on file   Occupational History   • Not on file   Tobacco Use   • Smoking status: Never Smoker   • Smokeless tobacco: Never Used   Vaping Use   • Vaping Use: Never used   Substance and Sexual Activity   • Alcohol use: No   • Drug use: No   • Sexual activity: Never     Partners: Male     Birth control/protection: Post-Menopausal   Other Topics Concern   • Primary/coprimary nurse & associates Not Asked   • Family contact information Not Asked   • OK to release patient information to the following Not Asked   • Patient preferred routine/Privacy concerns Not Asked   • Patient likes and dislikes Not Asked   • Participating In Research Study Not Asked   • Miscellaneous Not Asked   Social History Narrative   • Not on file     Social Determinants of Health     Financial Resource Strain: Low Risk    • Difficulty of Paying Living Expenses: Not hard at all   Food Insecurity: No Food Insecurity   • Worried About Running Out of Food in the Last Year: Never true   • Ran Out of Food in the Last Year: Never true   Transportation Needs: No Transportation Needs   • Lack of Transportation (Medical): No   • Lack of Transportation (Non-Medical): No   Physical Activity:    • Days of Exercise per Week:    • Minutes of Exercise per Session:   "  Stress:    • Feeling of Stress :    Social Connections:    • Frequency of Communication with Friends and Family:    • Frequency of Social Gatherings with Friends and Family:    • Attends Temple Services:    • Active Member of Clubs or Organizations:    • Attends Club or Organization Meetings:    • Marital Status:    Intimate Partner Violence:    • Fear of Current or Ex-Partner:    • Emotionally Abused:    • Physically Abused:    • Sexually Abused:        Allergies as of 05/19/2021 - Reviewed 05/19/2021   Allergen Reaction Noted   • Oxaliplatin Anaphylaxis 10/27/2015   • Codeine  04/05/2011   • Pcn [penicillins] Itching 04/05/2011   • Sulfa drugs Itching 04/05/2011   • Tape Rash 09/04/2013        Vitals:  /60 (BP Location: Left arm, Patient Position: Sitting, BP Cuff Size: Adult)   Pulse (!) 103   Resp 16   Ht 1.63 m (5' 4.17\")   Wt 69.4 kg (153 lb)   SpO2 97%     Current medications as of today   Current Outpatient Medications   Medication Sig Dispense Refill   • Tiotropium Bromide-Olodaterol (STIOLTO RESPIMAT) 2.5-2.5 MCG/ACT Aero Soln Take 2 Puffs by mouth every day. 1 Each 11   • albuterol 108 (90 Base) MCG/ACT Aero Soln inhalation aerosol Inhale 2 Puffs every 6 hours as needed for Shortness of Breath. 1 Each 11   • omeprazole (PRILOSEC) 20 MG delayed-release capsule Take 1 capsule by mouth every morning before breakfast. 30 minutes before meal. 30 capsule 1   • potassium chloride ER (KLOR-CON) 10 MEQ tablet Take 1 tablet by mouth every day. 100 tablet 3   • rivaroxaban (XARELTO) 10 MG Tab tablet Take 1 Tab by mouth every day. 30 Tab 5   • lidocaine-prilocaine (EMLA) 2.5-2.5 % Cream Apply to port 1 hour prior to access of port and cover with plastic wrap. 30 g 3   • cyanocobalamin (VITAMIN B-12) 500 MCG Tab Take 1,000 mcg by mouth every day.     • Multiple Vitamins-Minerals (CENTRUM SILVER PO) Take 1 Tab by mouth every day.     • Cholecalciferol (VITAMIN D3) 400 UNITS CAPS Take 1 Cap by mouth " every day.     • loratadine (CLARITIN) 10 MG TABS Take 10 mg by mouth every day. Indications: Hayfever       No current facility-administered medications for this visit.         Physical Exam:   Gen:           Alert and oriented, No apparent distress. Mood and affect appropriate, normal interaction with examiner.  Eyes:          PERRL, EOM intact, sclere white, conjunctive moist.  Ears:          Not examined.   Hearing:     Grossly intact.  Nose:          Normal, no lesions or deformities.  Dentition:    mask  Oropharynx:   mask  Mallampati Classification: mask  Neck:        Supple, trachea midline, no masses.  Respiratory Effort: No intercostal retractions or use of accessory muscles.   Lung Auscultation:      Clear to auscultation bilaterally; no rales, rhonchi or wheezing.  CV:            Regular rate and rhythm. No murmurs, rubs or gallops.  Abd:           Not examined.   Lymphadenopathy: Not examined.  Gait and Station: Normal.  Digits and Nails: No clubbing, cyanosis, petechiae, or nodes.   Cranial Nerves: II-XII grossly intact.  Skin:        No rashes, lesions or ulcers noted.               Ext:           No cyanosis or edema.      Assessment:  1. Shortness of breath  PULMONARY FUNCTION TESTS -Test requested: Complete Pulmonary Function Test; Include MIPS/MEPS? No    albuterol 108 (90 Base) MCG/ACT Aero Soln inhalation aerosol    CANCELED: NM-LUNG PERFUSION IMAGING    CANCELED: DX-CHEST-2 VIEWS   2. Secondary malignant neoplasm of liver (HCC)  NM-LUNG PERFUSION IMAGING    DX-CHEST-2 VIEWS    CANCELED: NM-LUNG PERFUSION IMAGING    CANCELED: DX-CHEST-2 VIEWS   3. History of pulmonary embolus (PE)  NM-LUNG PERFUSION IMAGING    DX-CHEST-2 VIEWS    CANCELED: NM-LUNG PERFUSION IMAGING    CANCELED: DX-CHEST-2 VIEWS   4. Dyspnea, unspecified type  albuterol 108 (90 Base) MCG/ACT Aero Soln inhalation aerosol    NM-LUNG PERFUSION IMAGING    DX-CHEST-2 VIEWS   5. Chronic obstructive pulmonary disease, unspecified COPD  type (HCC)  Tiotropium Bromide-Olodaterol (STIOLTO RESPIMAT) 2.5-2.5 MCG/ACT Aero Soln       Immunizations:    Flu:10/2020  Pneumovax 23:2006  Prevnar 13:2018  COVID-19: 2/16/21, 1/19/21    Plan:  1.  Patient continues to report shortness of breath and feels this may be slightly worse.  She did briefly stop bronchodilator therapy to see if there is any change but she felt her breathing did worsen.  She remains on these daily.  She is using albuterol HFA inhaler up to 2 times daily.  Her other complaint is ongoing cough.  She does not believe she is having indigestion or acid reflux but we will attempt treating this with Prilosec 20 mg daily 30 minutes prior to breakfast for the next month to see if this resolves cough.  Rx sent to pharmacy may also check cost of a counter.  Chest x-ray VQ scan now to rule out probability of new PE NOW  Consider bronchoscopy for tissue biopsy of right lower lobe opacity.  2.  Discussed restaurant hygiene.  3.  Normal oxygen readings in office today and prior normal CNOX at night and 2019  4.  For primary care for the health concerns  5.  Follow-up in 1 month for review of VQ scan results with further recommendations per MD, sooner if needed.    Please note that this dictation was created using voice recognition software. I have made every reasonable attempt to correct obvious errors, but it is possible there are errors of grammar and possibly content that I did not discover before finalizing the note.

## 2021-05-20 ENCOUNTER — OUTPATIENT INFUSION SERVICES (OUTPATIENT)
Dept: ONCOLOGY | Facility: MEDICAL CENTER | Age: 81
End: 2021-05-20
Attending: INTERNAL MEDICINE
Payer: MEDICARE

## 2021-05-20 ENCOUNTER — TELEPHONE (OUTPATIENT)
Dept: HEMATOLOGY ONCOLOGY | Facility: MEDICAL CENTER | Age: 81
End: 2021-05-20

## 2021-05-20 ENCOUNTER — HOSPITAL ENCOUNTER (OUTPATIENT)
Dept: RADIOLOGY | Facility: MEDICAL CENTER | Age: 81
End: 2021-05-20
Attending: NURSE PRACTITIONER
Payer: MEDICARE

## 2021-05-20 VITALS
OXYGEN SATURATION: 100 % | RESPIRATION RATE: 18 BRPM | HEART RATE: 107 BPM | DIASTOLIC BLOOD PRESSURE: 55 MMHG | TEMPERATURE: 97.4 F | HEIGHT: 64 IN | BODY MASS INDEX: 26.27 KG/M2 | SYSTOLIC BLOOD PRESSURE: 149 MMHG | WEIGHT: 153.88 LBS

## 2021-05-20 DIAGNOSIS — R26.89 BALANCE PROBLEM: ICD-10-CM

## 2021-05-20 DIAGNOSIS — C20 MALIGNANT NEOPLASM OF RECTUM (HCC): ICD-10-CM

## 2021-05-20 DIAGNOSIS — R94.4 DECREASED GFR: ICD-10-CM

## 2021-05-20 DIAGNOSIS — R42 DIZZY: ICD-10-CM

## 2021-05-20 DIAGNOSIS — C78.7 SECONDARY MALIGNANT NEOPLASM OF LIVER (HCC): ICD-10-CM

## 2021-05-20 PROCEDURE — 700117 HCHG RX CONTRAST REV CODE 255: Performed by: NURSE PRACTITIONER

## 2021-05-20 PROCEDURE — 700111 HCHG RX REV CODE 636 W/ 250 OVERRIDE (IP)

## 2021-05-20 PROCEDURE — A9576 INJ PROHANCE MULTIPACK: HCPCS | Performed by: NURSE PRACTITIONER

## 2021-05-20 PROCEDURE — 700105 HCHG RX REV CODE 258: Performed by: INTERNAL MEDICINE

## 2021-05-20 PROCEDURE — 96360 HYDRATION IV INFUSION INIT: CPT

## 2021-05-20 PROCEDURE — A4212 NON CORING NEEDLE OR STYLET: HCPCS

## 2021-05-20 PROCEDURE — 70553 MRI BRAIN STEM W/O & W/DYE: CPT

## 2021-05-20 RX ORDER — HEPARIN SODIUM (PORCINE) LOCK FLUSH IV SOLN 100 UNIT/ML 100 UNIT/ML
SOLUTION INTRAVENOUS
Status: COMPLETED
Start: 2021-05-20 | End: 2021-05-20

## 2021-05-20 RX ORDER — HEPARIN SODIUM (PORCINE) LOCK FLUSH IV SOLN 100 UNIT/ML 100 UNIT/ML
500 SOLUTION INTRAVENOUS PRN
Status: DISCONTINUED | OUTPATIENT
Start: 2021-05-20 | End: 2021-05-20 | Stop reason: HOSPADM

## 2021-05-20 RX ORDER — SODIUM CHLORIDE 9 MG/ML
500 INJECTION, SOLUTION INTRAVENOUS ONCE
Status: COMPLETED | OUTPATIENT
Start: 2021-05-20 | End: 2021-05-20

## 2021-05-20 RX ADMIN — HEPARIN 500 UNITS: 100 SYRINGE at 13:05

## 2021-05-20 RX ADMIN — HEPARIN SODIUM (PORCINE) LOCK FLUSH IV SOLN 100 UNIT/ML 500 UNITS: 100 SOLUTION at 13:05

## 2021-05-20 RX ADMIN — SODIUM CHLORIDE 500 ML: 9 INJECTION, SOLUTION INTRAVENOUS at 11:51

## 2021-05-20 RX ADMIN — HEPARIN SODIUM (PORCINE) LOCK FLUSH IV SOLN 100 UNIT/ML 500 UNITS: 100 SOLUTION at 16:04

## 2021-05-20 RX ADMIN — GADOTERIDOL 14 ML: 279.3 INJECTION, SOLUTION INTRAVENOUS at 15:58

## 2021-05-20 ASSESSMENT — FIBROSIS 4 INDEX: FIB4 SCORE: 0.82

## 2021-05-20 NOTE — PROGRESS NOTES
Patient here for hydration/pump disconnect. 0 ml remaining in pump. 69.5 ml given. Cassette removed from pump. 500 ml NS given per MAR over 1 hour. Port flushed with 20 ml of NS and heparin locked. Port de-accessed. Port re-accessed using Power Port needle for MRI appointment later this afternoon. Next appointment scheduled. Discharged to self care; no apparent distress noted.

## 2021-05-20 NOTE — TELEPHONE ENCOUNTER
Patient completed MRI brain to rule out malignancy.  This was completed today and results showed no abnormal parenchymal or meningeal contrast enhancement to suggest metastasis.  She does have some mild cerebral volume loss and some mild chronic microvascular ischemic disease.  However, there is no evidence of malignancy.    I did contact patient and left a voice message on her phone letting her know the results were negative for cancer within the brain.  Patient also has MyChart who will be able to see these results as well.        MR-BRAIN-WITH & W/O    Result Date: 5/20/2021 5/20/2021 3:11 PM HISTORY/REASON FOR EXAM:  Dizziness, non-specific; Patient with metastatic colon cancer to liver with mild dizziness and balance issues; TECHNIQUE/EXAM DESCRIPTION:   MRI of the brain without and with contrast. T1 sagittal, T2 fast spin-echo axial, T1 coronal, FLAIR coronal, diffusion-weighted and apparent diffusion coefficient (ADC map) axial images were obtained of the whole brain. T1 postcontrast axial and T1 postcontrast coronal images were obtained. The study was performed on a [employer] Ext[employer] Signa 1.5 Lulú MRI scanner. 14ml mL ProHance contrast was administered intravenously. COMPARISON:  None. FINDINGS:  There is no abnormal parenchymal or meningeal contrast enhancement. There is no acute infarct there is no acute or chronic parenchymal hemorrhage. Mild cerebral volume loss is seen. A few nonspecific T2 hyperintensities in the subcortical and periventricular white matter. There is no intra-axial space-occupying lesion. The ventricles, cortical sulci and the basal cisterns are prominent consistent with mild cerebral volume loss. There is no extra-axial fluid collection, hemorrhage or mass. The visualized flow voids of the cerebral vasculature are unremarkable.  There is  no large lesion identified in the expected course of the intracranial portions of the cranial nerves. The skull bones are unremarkable. The paranasal sinuses  are clear. The extracranial soft tissue including orbits appear grossly normal.     1.  There is no abnormal parenchymal or meningeal contrast enhancement to suggest metastasis. 2.  Mild cerebral volume loss. 3.  Mild chronic microvascular ischemic disease.

## 2021-05-20 NOTE — PROGRESS NOTES
Patient returns after MRI appointment. Heparin instilled prior to de-accessing port. Port de-accessed; gauze/tape applied to site. Discharged to self care; no apparent distress noted.

## 2021-05-21 NOTE — TELEPHONE ENCOUNTER
Called pt & confirmed she rec'vd voicemail message from Ella Fernandez regarding her negative brain MRI results.  Pt stated she rec'vd the message this morning.  Pt stated no further questions regarding the MRI results & stated her spouse is packing up their stuff to head home.  Informed pt to let us know if she needs anything.  Pt voiced understanding & agreeable.

## 2021-05-26 RX ORDER — DEXAMETHASONE SODIUM PHOSPHATE 4 MG/ML
8 INJECTION, SOLUTION INTRA-ARTICULAR; INTRALESIONAL; INTRAMUSCULAR; INTRAVENOUS; SOFT TISSUE ONCE
Status: CANCELLED | OUTPATIENT
Start: 2021-06-01 | End: 2021-06-01

## 2021-05-26 RX ORDER — ONDANSETRON 2 MG/ML
4 INJECTION INTRAMUSCULAR; INTRAVENOUS
Status: CANCELLED | OUTPATIENT
Start: 2021-06-01

## 2021-05-26 RX ORDER — SODIUM CHLORIDE 9 MG/ML
500 INJECTION, SOLUTION INTRAVENOUS ONCE
Status: CANCELLED | OUTPATIENT
Start: 2021-06-03 | End: 2021-06-03

## 2021-05-26 RX ORDER — DEXTROSE MONOHYDRATE 50 MG/ML
INJECTION, SOLUTION INTRAVENOUS CONTINUOUS
Status: CANCELLED | OUTPATIENT
Start: 2021-06-01

## 2021-05-26 RX ORDER — HEPARIN SODIUM (PORCINE) LOCK FLUSH IV SOLN 100 UNIT/ML 100 UNIT/ML
500 SOLUTION INTRAVENOUS PRN
Status: CANCELLED | OUTPATIENT
Start: 2021-06-03

## 2021-05-26 RX ORDER — ONDANSETRON 8 MG/1
8 TABLET, ORALLY DISINTEGRATING ORAL
Status: CANCELLED | OUTPATIENT
Start: 2021-06-01

## 2021-05-26 RX ORDER — SODIUM CHLORIDE 9 MG/ML
500 INJECTION, SOLUTION INTRAVENOUS ONCE
Status: CANCELLED | OUTPATIENT
Start: 2021-06-01 | End: 2021-06-01

## 2021-05-26 RX ORDER — LIDOCAINE HYDROCHLORIDE 10 MG/ML
10 INJECTION, SOLUTION INFILTRATION; PERINEURAL ONCE
Status: CANCELLED | OUTPATIENT
Start: 2021-06-01 | End: 2021-06-01

## 2021-05-26 RX ORDER — HEPARIN SODIUM (PORCINE) LOCK FLUSH IV SOLN 100 UNIT/ML 100 UNIT/ML
500 SOLUTION INTRAVENOUS PRN
Status: CANCELLED | OUTPATIENT
Start: 2021-06-01

## 2021-05-26 RX ORDER — PROCHLORPERAZINE MALEATE 10 MG
10 TABLET ORAL EVERY 6 HOURS PRN
Status: CANCELLED | OUTPATIENT
Start: 2021-06-01

## 2021-05-31 ENCOUNTER — HOSPITAL ENCOUNTER (OUTPATIENT)
Dept: LAB | Facility: MEDICAL CENTER | Age: 81
End: 2021-05-31
Attending: NURSE PRACTITIONER
Payer: MEDICARE

## 2021-05-31 LAB
COVID ORDER STATUS COVID19: NORMAL
SARS-COV-2 RNA RESP QL NAA+PROBE: NOTDETECTED
SPECIMEN SOURCE: NORMAL

## 2021-05-31 PROCEDURE — C9803 HOPD COVID-19 SPEC COLLECT: HCPCS

## 2021-05-31 PROCEDURE — U0005 INFEC AGEN DETEC AMPLI PROBE: HCPCS

## 2021-05-31 PROCEDURE — U0003 INFECTIOUS AGENT DETECTION BY NUCLEIC ACID (DNA OR RNA); SEVERE ACUTE RESPIRATORY SYNDROME CORONAVIRUS 2 (SARS-COV-2) (CORONAVIRUS DISEASE [COVID-19]), AMPLIFIED PROBE TECHNIQUE, MAKING USE OF HIGH THROUGHPUT TECHNOLOGIES AS DESCRIBED BY CMS-2020-01-R: HCPCS

## 2021-06-01 ENCOUNTER — OUTPATIENT INFUSION SERVICES (OUTPATIENT)
Dept: ONCOLOGY | Facility: MEDICAL CENTER | Age: 81
End: 2021-06-01
Attending: INTERNAL MEDICINE
Payer: MEDICARE

## 2021-06-01 ENCOUNTER — OFFICE VISIT (OUTPATIENT)
Dept: HEMATOLOGY ONCOLOGY | Facility: MEDICAL CENTER | Age: 81
End: 2021-06-01
Payer: MEDICARE

## 2021-06-01 VITALS
OXYGEN SATURATION: 98 % | HEART RATE: 107 BPM | RESPIRATION RATE: 18 BRPM | DIASTOLIC BLOOD PRESSURE: 68 MMHG | SYSTOLIC BLOOD PRESSURE: 110 MMHG | BODY MASS INDEX: 25.63 KG/M2 | HEIGHT: 64 IN | WEIGHT: 150.13 LBS | TEMPERATURE: 98.2 F

## 2021-06-01 VITALS
OXYGEN SATURATION: 99 % | BODY MASS INDEX: 25.67 KG/M2 | HEART RATE: 128 BPM | HEIGHT: 64 IN | WEIGHT: 150.35 LBS | DIASTOLIC BLOOD PRESSURE: 72 MMHG | TEMPERATURE: 97.5 F | RESPIRATION RATE: 18 BRPM | SYSTOLIC BLOOD PRESSURE: 136 MMHG

## 2021-06-01 VITALS — HEIGHT: 64 IN | WEIGHT: 150.35 LBS | BODY MASS INDEX: 25.67 KG/M2

## 2021-06-01 DIAGNOSIS — R06.02 SOB (SHORTNESS OF BREATH): ICD-10-CM

## 2021-06-01 DIAGNOSIS — C20 MALIGNANT NEOPLASM OF RECTUM (HCC): Chronic | ICD-10-CM

## 2021-06-01 DIAGNOSIS — R94.4 DECREASED GFR: ICD-10-CM

## 2021-06-01 DIAGNOSIS — Z79.01 CURRENT USE OF LONG TERM ANTICOAGULATION: ICD-10-CM

## 2021-06-01 DIAGNOSIS — Z86.711 HISTORY OF PULMONARY EMBOLUS (PE): ICD-10-CM

## 2021-06-01 DIAGNOSIS — R05.9 COUGH: ICD-10-CM

## 2021-06-01 DIAGNOSIS — C20 MALIGNANT NEOPLASM OF RECTUM (HCC): ICD-10-CM

## 2021-06-01 DIAGNOSIS — C78.7 SECONDARY MALIGNANT NEOPLASM OF LIVER (HCC): Chronic | ICD-10-CM

## 2021-06-01 LAB
ALBUMIN SERPL BCP-MCNC: 3.6 G/DL (ref 3.2–4.9)
ALBUMIN/GLOB SERPL: 1 G/DL
ALP SERPL-CCNC: 234 U/L (ref 30–99)
ALT SERPL-CCNC: 26 U/L (ref 2–50)
ANION GAP SERPL CALC-SCNC: 15 MMOL/L (ref 7–16)
AST SERPL-CCNC: 25 U/L (ref 12–45)
BASOPHILS # BLD AUTO: 0.6 % (ref 0–1.8)
BASOPHILS # BLD: 0.07 K/UL (ref 0–0.12)
BILIRUB SERPL-MCNC: 0.6 MG/DL (ref 0.1–1.5)
BUN SERPL-MCNC: 18 MG/DL (ref 8–22)
CALCIUM SERPL-MCNC: 9.9 MG/DL (ref 8.5–10.5)
CEA SERPL-MCNC: 1.7 NG/ML (ref 0–3)
CHLORIDE SERPL-SCNC: 100 MMOL/L (ref 96–112)
CO2 SERPL-SCNC: 18 MMOL/L (ref 20–33)
CREAT SERPL-MCNC: 1.21 MG/DL (ref 0.5–1.4)
EOSINOPHIL # BLD AUTO: 0.22 K/UL (ref 0–0.51)
EOSINOPHIL NFR BLD: 1.9 % (ref 0–6.9)
ERYTHROCYTE [DISTWIDTH] IN BLOOD BY AUTOMATED COUNT: 61.6 FL (ref 35.9–50)
GLOBULIN SER CALC-MCNC: 3.5 G/DL (ref 1.9–3.5)
GLUCOSE SERPL-MCNC: 148 MG/DL (ref 65–99)
HCT VFR BLD AUTO: 37.9 % (ref 37–47)
HGB BLD-MCNC: 11.5 G/DL (ref 12–16)
IMM GRANULOCYTES # BLD AUTO: 0.11 K/UL (ref 0–0.11)
IMM GRANULOCYTES NFR BLD AUTO: 1 % (ref 0–0.9)
LYMPHOCYTES # BLD AUTO: 1.35 K/UL (ref 1–4.8)
LYMPHOCYTES NFR BLD: 11.7 % (ref 22–41)
MCH RBC QN AUTO: 25.4 PG (ref 27–33)
MCHC RBC AUTO-ENTMCNC: 30.3 G/DL (ref 33.6–35)
MCV RBC AUTO: 83.7 FL (ref 81.4–97.8)
MONOCYTES # BLD AUTO: 0.91 K/UL (ref 0–0.85)
MONOCYTES NFR BLD AUTO: 7.9 % (ref 0–13.4)
NEUTROPHILS # BLD AUTO: 8.9 K/UL (ref 2–7.15)
NEUTROPHILS NFR BLD: 76.9 % (ref 44–72)
NRBC # BLD AUTO: 0 K/UL
NRBC BLD-RTO: 0 /100 WBC
PLATELET # BLD AUTO: 332 K/UL (ref 164–446)
PMV BLD AUTO: 10.6 FL (ref 9–12.9)
POTASSIUM SERPL-SCNC: 4.3 MMOL/L (ref 3.6–5.5)
PROT SERPL-MCNC: 7.1 G/DL (ref 6–8.2)
RBC # BLD AUTO: 4.53 M/UL (ref 4.2–5.4)
SODIUM SERPL-SCNC: 133 MMOL/L (ref 135–145)
WBC # BLD AUTO: 11.6 K/UL (ref 4.8–10.8)

## 2021-06-01 PROCEDURE — 80053 COMPREHEN METABOLIC PANEL: CPT

## 2021-06-01 PROCEDURE — A4212 NON CORING NEEDLE OR STYLET: HCPCS

## 2021-06-01 PROCEDURE — 700111 HCHG RX REV CODE 636 W/ 250 OVERRIDE (IP): Performed by: NURSE PRACTITIONER

## 2021-06-01 PROCEDURE — 85025 COMPLETE CBC W/AUTO DIFF WBC: CPT

## 2021-06-01 PROCEDURE — 96374 THER/PROPH/DIAG INJ IV PUSH: CPT | Mod: XU

## 2021-06-01 PROCEDURE — 36591 DRAW BLOOD OFF VENOUS DEVICE: CPT

## 2021-06-01 PROCEDURE — 96361 HYDRATE IV INFUSION ADD-ON: CPT

## 2021-06-01 PROCEDURE — G0498 CHEMO EXTEND IV INFUS W/PUMP: HCPCS

## 2021-06-01 PROCEDURE — 82378 CARCINOEMBRYONIC ANTIGEN: CPT

## 2021-06-01 PROCEDURE — 99214 OFFICE O/P EST MOD 30 MIN: CPT | Performed by: NURSE PRACTITIONER

## 2021-06-01 PROCEDURE — 700105 HCHG RX REV CODE 258: Performed by: NURSE PRACTITIONER

## 2021-06-01 RX ORDER — SODIUM CHLORIDE 9 MG/ML
500 INJECTION, SOLUTION INTRAVENOUS ONCE
Status: COMPLETED | OUTPATIENT
Start: 2021-06-01 | End: 2021-06-01

## 2021-06-01 RX ORDER — DEXAMETHASONE SODIUM PHOSPHATE 4 MG/ML
8 INJECTION, SOLUTION INTRA-ARTICULAR; INTRALESIONAL; INTRAMUSCULAR; INTRAVENOUS; SOFT TISSUE ONCE
Status: COMPLETED | OUTPATIENT
Start: 2021-06-01 | End: 2021-06-01

## 2021-06-01 RX ORDER — HEPARIN SODIUM (PORCINE) LOCK FLUSH IV SOLN 100 UNIT/ML 100 UNIT/ML
SOLUTION INTRAVENOUS
Status: DISCONTINUED
Start: 2021-06-01 | End: 2021-06-01 | Stop reason: HOSPADM

## 2021-06-01 RX ORDER — HEPARIN SODIUM (PORCINE) LOCK FLUSH IV SOLN 100 UNIT/ML 100 UNIT/ML
500 SOLUTION INTRAVENOUS PRN
Status: DISCONTINUED | OUTPATIENT
Start: 2021-06-01 | End: 2021-06-01 | Stop reason: HOSPADM

## 2021-06-01 RX ADMIN — FLUOROURACIL 3360 MG: 50 INJECTION, SOLUTION INTRAVENOUS at 09:41

## 2021-06-01 RX ADMIN — HEPARIN 500 UNITS: 100 SYRINGE at 07:32

## 2021-06-01 RX ADMIN — DEXAMETHASONE SODIUM PHOSPHATE 8 MG: 4 INJECTION, SOLUTION INTRA-ARTICULAR; INTRALESIONAL; INTRAMUSCULAR; INTRAVENOUS; SOFT TISSUE at 09:17

## 2021-06-01 RX ADMIN — SODIUM CHLORIDE 500 ML: 9 INJECTION, SOLUTION INTRAVENOUS at 08:32

## 2021-06-01 ASSESSMENT — ENCOUNTER SYMPTOMS
CONSTIPATION: 0
SPUTUM PRODUCTION: 1
PALPITATIONS: 0
MYALGIAS: 0
HEADACHES: 0
CHILLS: 0
DIARRHEA: 0
FEVER: 0
DIZZINESS: 1
NAUSEA: 0
SHORTNESS OF BREATH: 1
COUGH: 1
VOMITING: 0
WEIGHT LOSS: 1

## 2021-06-01 ASSESSMENT — PAIN SCALES - GENERAL: PAINLEVEL: NO PAIN

## 2021-06-01 ASSESSMENT — FIBROSIS 4 INDEX
FIB4 SCORE: 0.82
FIB4 SCORE: 0.82
FIB4 SCORE: 1.2

## 2021-06-01 NOTE — PROGRESS NOTES
Pt presents to [facility] for pre-chemo labs. L chest port accessed using sterile technique; brisk blood return observed and pt tolerated well. Labs drawn per orders. Brisk blood return observed from port before flushed and heparin locked; pt to return to clinic in an hour for chemo. Next appointment confirmed and education provided. Pt discharged to self care with all personal belongings and in NAD.

## 2021-06-01 NOTE — PROGRESS NOTES
Pt arrived to [facility] ambulatory, here for C122 of 5FU. Pt with L chest port, already accessed from lab draw appt this AM. Port flushed and blood return observed. Lab results reviewed and WNL for chemo to proceed. Pre-hydration infused over one hour as ordered. Premed given and 5FU pump connected to pt. Pt reporting increasing weakness and fatigue and family has been initiating more help for her and her  at home. Emotional support provided. Pt knows to return on Thursday, pump verified in RUN, and pt discharged home under self care in no apparent distress.

## 2021-06-01 NOTE — PROGRESS NOTES
"Pharmacy Chemotherapy Verification  Patient Name: Karlene Walters      Dx:  Met Colon CA      Protocol: 5-FU + Leucovorin  Cycle 131 ([place] cycle 123)    Previous treatment: 5/18/21       Leucovorin 400 mg/m² IV over 2 hours on Day 1, followed by  Fluorouracil 400 mg/m²  IVP on Day 1, followed by                --7/5/16: Dose reduced by 20% to 320 mg/m² starting with Cycle 18 ([place] #11)  due to neutropenia                -- 10/31/17: delete Leucovorin and 5-FU push d/t neutropenia per Dr. Hardy   Fluorouracil 2400 mg/m² continuous infusion over 46-48 hours                --7/5/16: Infusion Dose reduced by 20% starting with Cycle 18 ([place] #11)  due to neutropenia               --20% reduction (1920 mg/m²) to be continued starting C28 per C Alsop APRN    14 day cycles until disease progression or unacceptable toxicity  *Dosing Reference*     NCCN Guidelines for Colon Cancer. V.2.2015.  Jay T, et al. Eur J Cancer.1999;35(9):1343-7.     Allergies:  Oxaliplatin; Codeine; Pcn [penicillins]; Sulfa drugs; and Tape     Ht 1.625 m (5' 3.98\")   Wt 68.2 kg (150 lb 5.7 oz)   LMP  (LMP Unknown)   BMI 25.83 kg/m²  Body surface area is 1.75 meters squared.      Labs 6/1/21:  ANC~ 8900 Plt = 332k   Hgb = 11.5     SCr = 1.21 mg/dL CrCl ~ 39 mL/min   AST/ALT/ALK = 25/26/234 TBili = 0.6     Fluorouracil (5-FU) 1920 mg/m² x 1.75 m² = 3360 mg   <10% difference, okay to treat with final dose = 3360 mg CIVI   Via CADD pump for home infusion over 46 hours @ 1.5 mL/hr    Asha Krishnan, PharmD    "

## 2021-06-01 NOTE — PROGRESS NOTES
Subjective:      Karlene Walters is a 81 y.o. 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         HPI    Patient seen today in follow up for pre-chemo appt for metastatic colon cancer to liver. She presents unaccompanied for today's visit.      Patient seen today in follow up for metastatic colon cancer to liver.  She presents unaccompanied for today's visit.  Patient is scheduled to receive cycle #131 ([facility] plan #123) of single agent 5-FU which she continues on every 2-week interval.      Cancer History  Patient with a long history of rectal carcinoma initially diagnosed in 2012.  She was found to have metastatic disease to liver and lung in 2015.  She is status post liver ablation as well as has received Y 90 in the past.  She was on XELOX initially but had allergic reaction to oxaliplatin (last dose 7/28/15) and was switched to single agent 5-FU. Patient initially on 5-FU at 2400 mg/m2 with Leucovorin and 5-FU push starting on 10/27/15, followed by 20% dose reduction to 1920 mg/m2 for cycle 18 (7/5/16), followed by deletion of Leucovorin and 5-FU push on 10/31/17. In January 2020 patient was changed to every 3-week cycle at cycle #99 ([facility] #91) and then changed to every 4-week interval in May 2020.  It was at that time there was noted to have growth of disease within the liver and was seen by Dr. West, surgeon.  Patient did undergo surgery in hopes of resection of the tumor but it was felt to be too close to the hilum and therefore she did undergo ablation instead on 5/28/20.  She has been back on single agent 5-FU at every 2-week interval, restarted on 6/16/20. Last CT 10/06/20 showed the tumor ablation cavities within the right lobe of the liver have decreased in size since previous examination.  There is a 1.5 x 3.1 cm focal area of enhancement adjacent to the ablation cavity that appears to have increased concerning for possible tumor recurrence.  Based on  that finding she was sent for an MRI for further evaluation on 10/8/2020 which confirmed that the appearance of the masses noted in the liver did not have an appearance for suspicious finding for recurrent metastatic disease.  Patient continued with MRI follow up imaging every 3 months, most recent on 4/5/21 showed stable appearance of the liver with distortion of the right lobe consistent with prior partial hepatectomy and ablation.  No evidence of recurrent tumor.  Enhancing soft tissue nodule in the right cardiophrenic angle again seen and unchanged consistent with adenopathy, measuring 17 mm in size.      3/9/21 patient had experienced significant fatigue and shortness of breath.  This had progressively worsened and she was also seen by cardiology who recommended patient be admitted to the hospital for further cardiac work-up.  Echocardiogram completed which showed an ejection fraction of 65%.  CT chest with contrast completed as well which is stable.  Patient was discharged home with no conclusion as to her significant shortness of breath and fatigue.  We did hold cycle 126 ([facility] 118) due to her clinical status at that time.     CT chest completed on 5/18/2021 after return of significant respiratory concerns including severe shortness of breath and cough.  CT noted interval increase in the size of the wedge-shaped opacity in the right lower lobe, reading radiologist stated this could represent chronic pneumonitis, parenchymal scarring or metastatic disease.  Stable ablated cavity in the dome of the liver noted with postoperative changes in the right lobe of the liver.  Patient was seen by pulmonary 5/19/2021 and further work-up is underway.    MRI brain completed on 5/20/2021 showed no evidence of metastatic disease.     Interval History  Clinically patient is fairly stable.  She actually is slightly improved with her respiratory status but does continue to have a cough, sputum production and shortness of  "breath.  Overall though her fatigue does appear to be somewhat improving.  She did try Histinex which did help with her coughing.  She states that she is pushing herself a little too much which does cause severe fatigue.  She was also started on omeprazole by pulmonary and stated the GERD pain she gets has resolved.  She is still taking Stiolto inhaler as well as albuterol inhaler approximately 1-2 times per day.    Further work-up currently underway by pulmonary.  She is due to proceed with a VQ scan at the end of this week.  They are considering possible bronchoscopy for tissue biopsy but awaiting further results from images.    Allergies   Allergen Reactions   • Oxaliplatin Anaphylaxis   • Codeine      \"gets drunk\"   • Pcn [Penicillins] Itching     itching   • Sulfa Drugs Itching     itching   • Tape Rash     PAPER TAPE OK     Current Outpatient Medications on File Prior to Visit   Medication Sig Dispense Refill   • Tiotropium Bromide-Olodaterol (STIOLTO RESPIMAT) 2.5-2.5 MCG/ACT Aero Soln Take 2 Puffs by mouth every day. 1 Each 11   • albuterol 108 (90 Base) MCG/ACT Aero Soln inhalation aerosol Inhale 2 Puffs every 6 hours as needed for Shortness of Breath. 1 Each 11   • omeprazole (PRILOSEC) 20 MG delayed-release capsule Take 1 capsule by mouth every morning before breakfast. 30 minutes before meal. 30 capsule 1   • potassium chloride ER (KLOR-CON) 10 MEQ tablet Take 1 tablet by mouth every day. 100 tablet 3   • rivaroxaban (XARELTO) 10 MG Tab tablet Take 1 Tab by mouth every day. 30 Tab 5   • lidocaine-prilocaine (EMLA) 2.5-2.5 % Cream Apply to port 1 hour prior to access of port and cover with plastic wrap. 30 g 3   • cyanocobalamin (VITAMIN B-12) 500 MCG Tab Take 1,000 mcg by mouth every day.     • Multiple Vitamins-Minerals (CENTRUM SILVER PO) Take 1 Tab by mouth every day.     • Cholecalciferol (VITAMIN D3) 400 UNITS CAPS Take 1 Cap by mouth every day.     • loratadine (CLARITIN) 10 MG TABS Take 10 mg by " "mouth every day. Indications: Hayfever       No current facility-administered medications on file prior to visit.       Review of Systems   Constitutional: Positive for malaise/fatigue and weight loss (1 pound). Negative for chills and fever.   Respiratory: Positive for cough, sputum production (yellow sputum) and shortness of breath.    Cardiovascular: Negative for chest pain and palpitations.   Gastrointestinal: Negative for constipation, diarrhea, nausea and vomiting.        Output stable per ostomy   Genitourinary: Negative for dysuria.   Musculoskeletal: Negative for myalgias.   Neurological: Positive for dizziness (mild). Negative for headaches.        Ambulating with a cane - MRI brain no mets          Objective:     /68   Pulse (!) 107   Temp 36.8 °C (98.2 °F) (Temporal)   Resp 18   Ht 1.626 m (5' 4\")   Wt 68.1 kg (150 lb 2.1 oz)   LMP  (LMP Unknown)   SpO2 98%   BMI 25.77 kg/m²      Physical Exam  Vitals reviewed.   Constitutional:       General: She is not in acute distress.     Appearance: Normal appearance. She is not diaphoretic.   HENT:      Head: Normocephalic and atraumatic.   Cardiovascular:      Rate and Rhythm: Regular rhythm. Tachycardia present.   Pulmonary:      Effort: No respiratory distress.      Breath sounds: No wheezing.      Comments: SOB with exertion  Abdominal:      General: Bowel sounds are normal. There is no distension.      Palpations: Abdomen is soft.      Tenderness: There is no abdominal tenderness.   Musculoskeletal:         General: No swelling or tenderness. Normal range of motion.   Skin:     General: Skin is warm and dry.   Neurological:      Mental Status: She is alert and oriented to person, place, and time.   Psychiatric:         Mood and Affect: Mood normal.         Behavior: Behavior normal.       Results for KANG HALL (MRN [id number])   Ref. Range 6/1/2021 07:15   WBC Latest Ref Range: 4.8 - 10.8 K/uL 11.6 (H)   RBC Latest Ref Range: 4.20 - 5.40 " M/uL 4.53   Hemoglobin Latest Ref Range: 12.0 - 16.0 g/dL 11.5 (L)   Hematocrit Latest Ref Range: 37.0 - 47.0 % 37.9   MCV Latest Ref Range: 81.4 - 97.8 fL 83.7   MCH Latest Ref Range: 27.0 - 33.0 pg 25.4 (L)   MCHC Latest Ref Range: 33.6 - 35.0 g/dL 30.3 (L)   RDW Latest Ref Range: 35.9 - 50.0 fL 61.6 (H)   Platelet Count Latest Ref Range: 164 - 446 K/uL 332   MPV Latest Ref Range: 9.0 - 12.9 fL 10.6   Neutrophils-Polys Latest Ref Range: 44.00 - 72.00 % 76.90 (H)   Neutrophils (Absolute) Latest Ref Range: 2.00 - 7.15 K/uL 8.90 (H)   Lymphocytes Latest Ref Range: 22.00 - 41.00 % 11.70 (L)   Lymphs (Absolute) Latest Ref Range: 1.00 - 4.80 K/uL 1.35   Monocytes Latest Ref Range: 0.00 - 13.40 % 7.90   Monos (Absolute) Latest Ref Range: 0.00 - 0.85 K/uL 0.91 (H)   Eosinophils Latest Ref Range: 0.00 - 6.90 % 1.90   Eos (Absolute) Latest Ref Range: 0.00 - 0.51 K/uL 0.22   Basophils Latest Ref Range: 0.00 - 1.80 % 0.60   Baso (Absolute) Latest Ref Range: 0.00 - 0.12 K/uL 0.07   Immature Granulocytes Latest Ref Range: 0.00 - 0.90 % 1.00 (H)   Immature Granulocytes (abs) Latest Ref Range: 0.00 - 0.11 K/uL 0.11   Nucleated RBC Latest Units: /100 WBC 0.00   NRBC (Absolute) Latest Units: K/uL 0.00   Sodium Latest Ref Range: 135 - 145 mmol/L 133 (L)   Potassium Latest Ref Range: 3.6 - 5.5 mmol/L 4.3   Chloride Latest Ref Range: 96 - 112 mmol/L 100   Co2 Latest Ref Range: 20 - 33 mmol/L 18 (L)   Anion Gap Latest Ref Range: 7.0 - 16.0  15.0   Glucose Latest Ref Range: 65 - 99 mg/dL 148 (H)   Bun Latest Ref Range: 8 - 22 mg/dL 18   Creatinine Latest Ref Range: 0.50 - 1.40 mg/dL 1.21   GFR If  Latest Ref Range: >60 mL/min/1.73 m 2 52 (A)   GFR If Non  Latest Ref Range: >60 mL/min/1.73 m 2 43 (A)   Calcium Latest Ref Range: 8.5 - 10.5 mg/dL 9.9   AST(SGOT) Latest Ref Range: 12 - 45 U/L 25   ALT(SGPT) Latest Ref Range: 2 - 50 U/L 26   Alkaline Phosphatase Latest Ref Range: 30 - 99 U/L 234 (H)   Total  Bilirubin Latest Ref Range: 0.1 - 1.5 mg/dL 0.6   Albumin Latest Ref Range: 3.2 - 4.9 g/dL 3.6   Total Protein Latest Ref Range: 6.0 - 8.2 g/dL 7.1   Globulin Latest Ref Range: 1.9 - 3.5 g/dL 3.5   A-G Ratio Latest Units: g/dL 1.0   Carcinoembryonic Antigen Latest Ref Range: 0.0 - 3.0 ng/mL 1.7          Assessment/Plan:     1. Malignant neoplasm of rectum (HCC)     2. Secondary malignant neoplasm of liver (HCC)     3. History of pulmonary embolus (PE)     4. Current use of long term anticoagulation     5. SOB (shortness of breath)     6. Cough         1.  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  She is scheduled to receive cycle #131 ([facility] plan #123) today.  I did personally review her CBC, CMP and CEA and labs are appropriate to proceed with treatment as planned.  Patient clinically is stable with continued cough and shortness of breath.  Patient has noticed improvement with the addition of 500 cc pre and post treatment and will continue with the hydration.  Currently patient undergoing further work-up with pulmonary, however we will continue to treat patient at this time.    Patient has had her alkaline phosphatase waxing and waning over the last few months.  It has trended down this time to 234.  Her CEA also continues to wax and wane, now at 1.7.  I did review the labs with the patient today.     2.  Most recent MRI of the abdomen is stable.  Per medical record, surgeon, Dr. West has plan to repeat an MRI in 6 months, October 2021.     3.  Patient with significant shortness of breath and cough.    CT chest completed on 5/18/2021.  She is followed up with pulmonary and further evaluation with VQ scan scheduled for this Friday.  Will defer to pulmonary at this time for further management and monitoring.     4.  She does have a history of pulmonary embolism many years ago and has been on long-term use of Xarelto at a low dose.    5.  Patient to follow-up in the  clinic in 2 weeks or sooner if needed prior to next dose of chemotherapy.

## 2021-06-01 NOTE — PROGRESS NOTES
Chemotherapy Verification - SECONDARY RN       Height = 162.5 cm  Weight = 68.2 kg  BSA = 1.75 m2       Medication: 5FU CADD pump  Dose: 1920 mg/m2  Calculated Dose: 3360 mg                             (In mg/m2, AUC, mg/kg)     I confirm that this process was performed independently.

## 2021-06-01 NOTE — PROGRESS NOTES
"Pharmacy Chemotherapy Verification    Patient Name: Karlene Walters      Dx:  Met Colon CA        Protocol: 5-FU + Leucovorin  Leucovorin 400 mg/m² IV over 2 hours on Day 1, followed by  Fluorouracil 400 mg/m²  IVP on Day 1, followed by                --7/5/16: Dose reduced by 20% to 320 mg/m² starting with Cycle 18 ([facility] #11)  due to neutropenia                -- 10/31/17: delete Leucovorin and 5-FU push d/t neutropenia per Dr. Hardy   Fluorouracil 2400 mg/m² continuous infusion over 46-48 hours                --7/5/16: Infusion Dose reduced by 20% starting with Cycle 18 ([facility] #11)  due to neutropenia               --20% reduction (1920 mg/m²) to be continued starting C28 per C Alsop APRN    14 day cycles until disease progression or unacceptable toxicity  *Dosing Reference*     NCCN Guidelines for Colon Cancer. V.2.2015.  Jay T, et al. Eur J Cancer.1999;35(9):1343-7.     Allergies:  Oxaliplatin; Codeine; Pcn [penicillins]; Sulfa drugs; and Tape     Ht 1.625 m (5' 3.98\")   Wt 68.2 kg (150 lb 5.7 oz)   LMP  (LMP Unknown)   BMI 25.83 kg/m²  Body surface area is 1.75 meters squared.      All lab results 6/1/21 within treatment parameters.       Drug Order   (Drug name, dose, route, IV Fluid & volume, frequency, number of doses) Cycle 131 ([facility] cycle 123)    Previous treatment: 5/18/21      Medication = Fluorouracil (5-FU)  Base Dose = 1920 mg/m²  Calc Dose: Base Dose x 1.75m² = 3360mg  Final Dose = 3360mg   Route = CIVI   Conc = 50 mg/mL  Fluid & Volume = 67.2mL (+ 3 mL overfill )  Admin Duration = Over 46 hours @ 1.5mL/hour    Via CADD pump for home infusion       Okay to treat with final dose     By my signature below, I confirm this process was performed independently with the BSA and all final chemotherapy dosing calculations congruent. I have reviewed the above chemotherapy order and that my calculation of the final dose and BSA (when applicable) corroborate those calculations of the  " pharmacist. Discrepancies of 10% or greater in the written dose have been addressed and documented within the EPIC Progress notes.    DARIN Arroyo Pharm.D.

## 2021-06-03 ENCOUNTER — OUTPATIENT INFUSION SERVICES (OUTPATIENT)
Dept: ONCOLOGY | Facility: MEDICAL CENTER | Age: 81
End: 2021-06-03
Attending: INTERNAL MEDICINE
Payer: MEDICARE

## 2021-06-03 VITALS
TEMPERATURE: 97 F | HEIGHT: 64 IN | BODY MASS INDEX: 25.22 KG/M2 | HEART RATE: 114 BPM | DIASTOLIC BLOOD PRESSURE: 65 MMHG | WEIGHT: 147.71 LBS | RESPIRATION RATE: 18 BRPM | OXYGEN SATURATION: 98 % | SYSTOLIC BLOOD PRESSURE: 143 MMHG

## 2021-06-03 DIAGNOSIS — R94.4 DECREASED GFR: ICD-10-CM

## 2021-06-03 DIAGNOSIS — C20 MALIGNANT NEOPLASM OF RECTUM (HCC): ICD-10-CM

## 2021-06-03 PROCEDURE — 96360 HYDRATION IV INFUSION INIT: CPT

## 2021-06-03 PROCEDURE — 700111 HCHG RX REV CODE 636 W/ 250 OVERRIDE (IP): Performed by: NURSE PRACTITIONER

## 2021-06-03 PROCEDURE — 700105 HCHG RX REV CODE 258: Performed by: NURSE PRACTITIONER

## 2021-06-03 RX ORDER — HEPARIN SODIUM (PORCINE) LOCK FLUSH IV SOLN 100 UNIT/ML 100 UNIT/ML
500 SOLUTION INTRAVENOUS PRN
Status: DISCONTINUED | OUTPATIENT
Start: 2021-06-03 | End: 2021-06-03 | Stop reason: HOSPADM

## 2021-06-03 RX ORDER — SODIUM CHLORIDE 9 MG/ML
500 INJECTION, SOLUTION INTRAVENOUS ONCE
Status: COMPLETED | OUTPATIENT
Start: 2021-06-03 | End: 2021-06-03

## 2021-06-03 RX ADMIN — SODIUM CHLORIDE 500 ML: 9 INJECTION, SOLUTION INTRAVENOUS at 11:59

## 2021-06-03 RX ADMIN — HEPARIN 500 UNITS: 100 SYRINGE at 12:59

## 2021-06-03 ASSESSMENT — FIBROSIS 4 INDEX: FIB4 SCORE: 1.2

## 2021-06-03 NOTE — PROGRESS NOTES
Pt presented to infusion for de-access and hydration. Pump had 0 ml in resevoir, disconnected. Port flushed and brisk blood return observed 500 ml NS infused over 1 hour per orders without issue. Port flushed, heparinized and de-accessed, gauze drsg placed. Has next appt, left on foot to self care.

## 2021-06-04 ENCOUNTER — HOSPITAL ENCOUNTER (OUTPATIENT)
Dept: RADIOLOGY | Facility: MEDICAL CENTER | Age: 81
End: 2021-06-04
Attending: NURSE PRACTITIONER
Payer: MEDICARE

## 2021-06-04 DIAGNOSIS — Z86.711 HISTORY OF PULMONARY EMBOLUS (PE): ICD-10-CM

## 2021-06-04 DIAGNOSIS — R06.00 DYSPNEA, UNSPECIFIED TYPE: ICD-10-CM

## 2021-06-04 DIAGNOSIS — C78.7 SECONDARY MALIGNANT NEOPLASM OF LIVER (HCC): ICD-10-CM

## 2021-06-04 PROCEDURE — A9540 TC99M MAA: HCPCS

## 2021-06-04 PROCEDURE — 71046 X-RAY EXAM CHEST 2 VIEWS: CPT

## 2021-06-07 ENCOUNTER — TELEPHONE (OUTPATIENT)
Dept: HEMATOLOGY ONCOLOGY | Facility: MEDICAL CENTER | Age: 81
End: 2021-06-07

## 2021-06-07 RX ORDER — DOXYCYCLINE HYCLATE 100 MG/1
100 CAPSULE ORAL 2 TIMES DAILY
Qty: 20 CAPSULE | Refills: 0 | Status: SHIPPED | OUTPATIENT
Start: 2021-06-07 | End: 2021-06-22

## 2021-06-07 RX ORDER — PREDNISONE 10 MG/1
TABLET ORAL
Qty: 18 TABLET | Refills: 0 | Status: SHIPPED | OUTPATIENT
Start: 2021-06-07 | End: 2021-06-22

## 2021-06-07 NOTE — PROGRESS NOTES
Patient cough/phlegm has worsened, unable to sleep due to cough. Rx for doxycycline and prednisone taper now. Advise robitussin OTC for cough and if not improving, may call in Tuionex. She will contact me if symptoms or cough worsens.

## 2021-06-11 RX ORDER — LIDOCAINE HYDROCHLORIDE 10 MG/ML
10 INJECTION, SOLUTION INFILTRATION; PERINEURAL ONCE
Status: CANCELLED | OUTPATIENT
Start: 2021-06-29 | End: 2021-06-15

## 2021-06-11 RX ORDER — DEXAMETHASONE SODIUM PHOSPHATE 4 MG/ML
8 INJECTION, SOLUTION INTRA-ARTICULAR; INTRALESIONAL; INTRAMUSCULAR; INTRAVENOUS; SOFT TISSUE ONCE
Status: CANCELLED | OUTPATIENT
Start: 2021-06-29 | End: 2021-06-15

## 2021-06-11 RX ORDER — PROCHLORPERAZINE MALEATE 10 MG
10 TABLET ORAL EVERY 6 HOURS PRN
Status: CANCELLED | OUTPATIENT
Start: 2021-06-29

## 2021-06-11 RX ORDER — SODIUM CHLORIDE 9 MG/ML
500 INJECTION, SOLUTION INTRAVENOUS ONCE
Status: CANCELLED | OUTPATIENT
Start: 2021-07-01 | End: 2021-06-17

## 2021-06-11 RX ORDER — ONDANSETRON 2 MG/ML
4 INJECTION INTRAMUSCULAR; INTRAVENOUS
Status: CANCELLED | OUTPATIENT
Start: 2021-06-29

## 2021-06-11 RX ORDER — HEPARIN SODIUM (PORCINE) LOCK FLUSH IV SOLN 100 UNIT/ML 100 UNIT/ML
500 SOLUTION INTRAVENOUS PRN
Status: CANCELLED | OUTPATIENT
Start: 2021-06-29

## 2021-06-11 RX ORDER — DEXTROSE MONOHYDRATE 50 MG/ML
INJECTION, SOLUTION INTRAVENOUS CONTINUOUS
Status: CANCELLED | OUTPATIENT
Start: 2021-06-29

## 2021-06-11 RX ORDER — SODIUM CHLORIDE 9 MG/ML
500 INJECTION, SOLUTION INTRAVENOUS ONCE
Status: CANCELLED | OUTPATIENT
Start: 2021-06-29 | End: 2021-06-15

## 2021-06-11 RX ORDER — HEPARIN SODIUM (PORCINE) LOCK FLUSH IV SOLN 100 UNIT/ML 100 UNIT/ML
500 SOLUTION INTRAVENOUS PRN
Status: CANCELLED | OUTPATIENT
Start: 2021-07-01

## 2021-06-11 RX ORDER — ONDANSETRON 8 MG/1
8 TABLET, ORALLY DISINTEGRATING ORAL
Status: CANCELLED | OUTPATIENT
Start: 2021-06-29

## 2021-06-11 NOTE — PROGRESS NOTES
"06/14/21    Subjective    Chief Complaint:  Rectal cancer metastatic to liver    HPI:  See multiple prior notes. 81 WM silvano from [place] initially diagnosed in 2012 with KRAS mutated, MSI stable rectal cancer s/p abdominoperitoneal resection. She developed liver and lung metastases in 2015. She was initially treated with XELOX but had allergic reaction to Oxaliplatin. She was changed to single agent 5FU and has been on it ever since. Has had Y90 and liver lesions ablated several times. Most recently she has had shortness of breath with a normal echocardiogram and negative CT chest. She has chronically elevated alkaline phosphatase and slowly rising CEAs. She has never had irinotecan or avastin as far as I can tell. She is complaining of profound SOB. Gets light headed with exertion. Had a CXR 6/4/21 which looks normal to me. Had a CT chest and ECHOcardiogram which we normal. Has appointment with pulmonary next week. O2 sat is 97%. Has lost 30 lbs. No appetite. Recent VQ scan no evidence of pulmonary emboli.     ROS:    Constitutional: 30lb weight loss  Skin: No rash or jaundice  HENT: No change in eyesight or hearing  Cardiovascular:No chest pain or arrythmia  Respiratory: See PI  GI:No nausea, vomiting, diarrhea, constipation  :No dysuria or frequency  Musculoskeletal:No bone or joint pain  Neuro:No sx's of neuropathy  Psych: No complaints    PMH:      Allergies   Allergen Reactions   • Oxaliplatin Anaphylaxis   • Codeine      \"gets drunk\"   • Pcn [Penicillins] Itching     itching   • Sulfa Drugs Itching     itching   • Tape Rash     PAPER TAPE OK       Past Medical History:   Diagnosis Date   • Adverse effect of anesthesia     elevated BP postop   • Anesthesia    • Arrhythmia     occasional   • Blood clotting disorder (HCC) 08/2015    bilat PE    • Blood transfusion 1957   • Breath shortness     pt reports d/t chemo treatment; no O2; with moderate exertion; no c/o at this time   • Cancer (HCC) 09/2012    " rectal cancer,  Liver CA-2015   • CATARACT     removed b/l   • Chemotherapy-induced neutropenia (HCC) 9/28/2016   • Chickenpox     history of   • Colon cancer (HCC)    • Dental disorder     dentures UPPERS AND LOWERS   • Elevated alkaline phosphatase level 11/15/2017   • Emphysema of lung (HCC)     COPD   • Fall    • [place] measles     history of   • Heart murmur     as a child   • Hemorrhagic disorder (HCC)     on Xarelto    • High cholesterol    • Hypercholesteremia    • Hypertension     no meds currently    • Ileostomy in place (HCC)    • Ileostomy in place (HCC)    • Indigestion    • Influenza     history of   • Lightheadedness 9/17/2015   • Mumps     history of   • Obstruction     ileostomy   • Other specified symptom associated with female genital organs     HYSTERECTOMY 1993   • Pneumonia 05/2017    tx'd with antibx   • Pulmonary embolism (HCC)    • 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)    • Renal insufficiency 3/14/2016   • Restless legs 5/6/2020   • Routine general medical examination at a health care facility 3/14/2016    3/14/16   • Seasonal allergies    • Swelling of both ankles     Lasix PRN   • Tonsillitis         Past Surgical History:   Procedure Laterality Date   • HEPATIC ABLATION  5/28/2020    Procedure: ABLATION, LESION, LIVER-TRISEGMENTECTOMY, MICROWAVE ABLATION, INTRA OPERATIVE ULTRA SOUND, SIMPLE RESECTED / REPAIR OF DIAPHRAGM;  Surgeon: Kit West M.D.;  Location: SURGERY [facility];  Service: General   • HEPATIC ABLATION LAPAROSCOPIC  11/15/2018    Procedure: HEPATIC ABLATION LAPAROSCOPIC.- SEGMENT 7/8;  Surgeon: Kit West M.D.;  Location: SURGERY [facility];  Service: General   • COLONOSCOPY WITH BIOPSY  8/23/2015    Procedure: COLONOSCOPY WITH BIOPSY;  Surgeon: Wilbur Glasgow M.D.;  Location: ENDOSCOPY [facility];  Service:    • HEPATIC ABLATION LAPAROSCOPIC  7/6/2015    Procedure: HEPATIC ABLATION LAPAROSCOPIC.;  Surgeon: Kit VIDALES  MARIANA West;  Location: [facility];  Service:    • CATH PLACEMENT Left 7/6/2015    Procedure: CATH PLACEMENT CEPHALIC POWERPORT;  Surgeon: Kit West M.D.;  Location: [facility];  Service:    • FISTULA IN ANO REPAIR  1/28/2015    Performed by Kolton Montgomery M.D. at [facility]   • PERINEAL PROCEDURE  1/28/2015    Performed by Kolton Montgomery M.D. at [facility]   • FISTULA IN ANO REPAIR  10/15/2014    Performed by Kolton Montgomery M.D. at [facility]   • PERINEAL PROCEDURE  10/15/2014    Performed by Kolton Montgomery M.D. at [facility]   • IRRIGATION & DEBRIDEMENT GENERAL  2/19/2014    Performed by Kolton Montgomery M.D. at [facility]   • FLAP GRAFT  2/19/2014    Performed by Kolton Montgomery M.D. at [facility]   • PERINEAL PROCEDURE  12/18/2013    Performed by Kolton Montgomery M.D. at [facility]   • MYOCUTANEOUS FLAP  12/18/2013    Performed by Kolton Montgomery M.D. at [facility]   • IRRIGATION & DEBRIDEMENT GENERAL  10/23/2013    Performed by Kolton Montgomery M.D. at [facility]   • FISTULA IN ANO REPAIR  9/4/2013    Performed by Kolton Montgomery M.D. at [facility]   • FLAP ROTATION  9/4/2013    Performed by Kolton Montgomery M.D. at [facility]   • RECOVERY  8/26/2013    Performed by [facility]Recovery Surgery at [facility] SAME DAY [facility]   • BIOPSY GENERAL  7/17/2013    Performed by Kolton Montgomery M.D. at [facility]   • PROCTOSCOPY  7/17/2013    Performed by Kolton Montgomery M.D. at [facility]   • FISTULA IN ANO REPAIR  2/27/2013    Performed by Kolton Montgomery M.D. at [facility]   • SIGMOIDOSCOPY  2/27/2013    Performed by Kolton Montgomery M.D. at [facility] TOWER ORS   • LAPAROSCOPY  10/8/2012    Performed by Kolton Montgomery M.D. at [facility]   • ILEO LOOP DIVERSION  10/8/2012    Performed by Kolton Montgomery M.D. at  SURGERY [facility] ORS   • COLECTOMY  9/26/2012    Performed by Kolton Montgomery M.D. at SURGERY [facility] ORS   • COLONOSCOPY FLEX W/ENDO US  9/20/2012    Performed by Harshil Bolton M.D. at SURGERY [facility] ORS   • OTHER  2009    left eye torn retina repair   • CATARACT EXTRACTION WITH IOL  2004    bilateral   • ABDOMINAL HYSTERECTOMY TOTAL  1983   • CYST EXCISION  1972    ovarian   • COLON RESECTION     • EYE SURGERY      cataracts   • HYSTERECTOMY LAPAROSCOPY     • PB REMV 2ND CATARACT,CORN-SCLER SECTN     • TONSILLECTOMY          Medications:    Current Outpatient Medications on File Prior to Visit   Medication Sig Dispense Refill   • doxycycline (VIBRAMYCIN) 100 MG Cap Take 1 capsule by mouth 2 times a day. Take until gone. 20 capsule 0   • predniSONE (DELTASONE) 10 MG Tab Take 30mg x 3 days, then take 20mg x 3 days, then take 10mg x 3 days, with food, then discontinue. 18 tablet 0   • Tiotropium Bromide-Olodaterol (STIOLTO RESPIMAT) 2.5-2.5 MCG/ACT Aero Soln Take 2 Puffs by mouth every day. 1 Each 11   • albuterol 108 (90 Base) MCG/ACT Aero Soln inhalation aerosol Inhale 2 Puffs every 6 hours as needed for Shortness of Breath. 1 Each 11   • omeprazole (PRILOSEC) 20 MG delayed-release capsule Take 1 capsule by mouth every morning before breakfast. 30 minutes before meal. 30 capsule 1   • potassium chloride ER (KLOR-CON) 10 MEQ tablet Take 1 tablet by mouth every day. 100 tablet 3   • rivaroxaban (XARELTO) 10 MG Tab tablet Take 1 Tab by mouth every day. 30 Tab 5   • lidocaine-prilocaine (EMLA) 2.5-2.5 % Cream Apply to port 1 hour prior to access of port and cover with plastic wrap. 30 g 3   • cyanocobalamin (VITAMIN B-12) 500 MCG Tab Take 1,000 mcg by mouth every day.     • Multiple Vitamins-Minerals (CENTRUM SILVER PO) Take 1 Tab by mouth every day.     • Cholecalciferol (VITAMIN D3) 400 UNITS CAPS Take 1 Cap by mouth every day.     • loratadine (CLARITIN) 10 MG TABS Take 10 mg by mouth every day.  "Indications: Hayfever       No current facility-administered medications on file prior to visit.       Social History     Tobacco Use   • Smoking status: Never Smoker   • Smokeless tobacco: Never Used   Substance Use Topics   • Alcohol use: No        Family History   Problem Relation Age of Onset   • Heart Disease Mother    • Heart Failure Mother    • Hypertension Mother    • Hyperlipidemia Mother    • Cancer Mother    • Cancer Maternal Aunt 60        breast ca   • Lung Disease Brother         COPD/Emphysema/Smoker   • Cancer Brother         prostate cancer   • Alcohol/Drug Brother         Alcohol   • Kidney Disease Father         renal failure        Objective    Vitals:    /58 (BP Location: Right arm, Patient Position: Sitting, BP Cuff Size: Adult)   Pulse (!) 106   Temp 36.7 °C (98.1 °F) (Temporal)   Resp 20   Ht 1.622 m (5' 3.86\")   Wt 64.9 kg (143 lb 3 oz)   LMP  (LMP Unknown)   SpO2 97%   BMI 24.69 kg/m²     Physical Exam:    Appears pale with evidence of recent weight loss. Distressed.     Head -  Normocephalic .   Eyes - Pupils are equal, round. Conjunctivae and EOM are normal. No scleral icterus.   Ears - normal hearing  Neck - Normal range of motion. Neck supple. No thyromegaly  Cardiovascular - Slight tachy. regular rhythm, normal heart sounds and intact distal pulses. No  gallop, murmur or rub  Pulmonary - Normal breath sounds.  No wheeze, rales or rhonci  Breast - symmetrical. No mass on indentation.  Abdominal -Soft. No distension, tenderness, organomegaly or mass. Colostomy looks ok.   Extremities-  No edema or tenderness.    Nodes - No submental, submandibular, preauricular, cervical, axillary or inguinal adenopathy.    Neurological -   Alert and oriented.  Skin - Skin is warm and dry. No rash noted. Not diaphoretic. No erythema. No pallor. No jaundice   Psychiatric -  Normal mood and affect.    Labs:  Results for KANG HALL (MRN [id number])    Ref. Range 4/20/2021 07:17 5/4/2021 " 07:10 5/18/2021 07:30 5/31/2021 06:45 6/1/2021 07:15   Carcinoembryonic Antigen Latest Ref Range: 0.0 - 3.0 ng/mL 1.3 2.0 2.3  1.7       Assessment  Shortness of breath with normal O2 sat and negative CXR, CT, ECHO is puzzling. Has had a vast amount of 5FU and it could possibly be some sort of 5FU toxicity  Imp:    Visit Diagnosis:    1. Malignant neoplasm of rectum (HCC)     2. Secondary malignant neoplasm of liver (HCC)     3. History of pulmonary embolus (PE)     4. SOB (shortness of breath)           Plan:  See pulmonary  Hold 5FU  Consider alternative chemo depending on how she does off 5FU  RTC to see me at next visit scheduled in 2 weeks    Lucho Steele M.D.

## 2021-06-14 ENCOUNTER — OFFICE VISIT (OUTPATIENT)
Dept: HEMATOLOGY ONCOLOGY | Facility: MEDICAL CENTER | Age: 81
End: 2021-06-14
Payer: MEDICARE

## 2021-06-14 VITALS
RESPIRATION RATE: 20 BRPM | DIASTOLIC BLOOD PRESSURE: 58 MMHG | HEART RATE: 106 BPM | TEMPERATURE: 98.1 F | OXYGEN SATURATION: 97 % | WEIGHT: 143.19 LBS | BODY MASS INDEX: 24.45 KG/M2 | SYSTOLIC BLOOD PRESSURE: 100 MMHG | HEIGHT: 64 IN

## 2021-06-14 DIAGNOSIS — Z86.711 HISTORY OF PULMONARY EMBOLUS (PE): ICD-10-CM

## 2021-06-14 DIAGNOSIS — R06.02 SOB (SHORTNESS OF BREATH): ICD-10-CM

## 2021-06-14 DIAGNOSIS — C20 MALIGNANT NEOPLASM OF RECTUM (HCC): ICD-10-CM

## 2021-06-14 DIAGNOSIS — C78.7 SECONDARY MALIGNANT NEOPLASM OF LIVER (HCC): ICD-10-CM

## 2021-06-14 PROCEDURE — 99214 OFFICE O/P EST MOD 30 MIN: CPT | Performed by: INTERNAL MEDICINE

## 2021-06-14 ASSESSMENT — FIBROSIS 4 INDEX: FIB4 SCORE: 1.2

## 2021-06-14 ASSESSMENT — PAIN SCALES - GENERAL: PAINLEVEL: NO PAIN

## 2021-06-15 ENCOUNTER — APPOINTMENT (OUTPATIENT)
Dept: ONCOLOGY | Facility: MEDICAL CENTER | Age: 81
End: 2021-06-15
Attending: INTERNAL MEDICINE
Payer: MEDICARE

## 2021-06-17 ENCOUNTER — APPOINTMENT (OUTPATIENT)
Dept: ONCOLOGY | Facility: MEDICAL CENTER | Age: 81
End: 2021-06-17
Attending: INTERNAL MEDICINE
Payer: MEDICARE

## 2021-06-21 ENCOUNTER — NON-PROVIDER VISIT (OUTPATIENT)
Dept: SLEEP MEDICINE | Facility: MEDICAL CENTER | Age: 81
End: 2021-06-21
Attending: NURSE PRACTITIONER
Payer: MEDICARE

## 2021-06-21 ENCOUNTER — OFFICE VISIT (OUTPATIENT)
Dept: SLEEP MEDICINE | Facility: MEDICAL CENTER | Age: 81
End: 2021-06-21
Payer: MEDICARE

## 2021-06-21 VITALS — WEIGHT: 144 LBS | HEIGHT: 65 IN | BODY MASS INDEX: 23.99 KG/M2

## 2021-06-21 VITALS
DIASTOLIC BLOOD PRESSURE: 60 MMHG | RESPIRATION RATE: 18 BRPM | BODY MASS INDEX: 23.99 KG/M2 | WEIGHT: 144 LBS | HEART RATE: 113 BPM | SYSTOLIC BLOOD PRESSURE: 120 MMHG | TEMPERATURE: 97.7 F | HEIGHT: 65 IN | OXYGEN SATURATION: 97 %

## 2021-06-21 DIAGNOSIS — R05.9 COUGH: ICD-10-CM

## 2021-06-21 DIAGNOSIS — K21.9 GASTROESOPHAGEAL REFLUX DISEASE, UNSPECIFIED WHETHER ESOPHAGITIS PRESENT: ICD-10-CM

## 2021-06-21 DIAGNOSIS — R93.89 ABNORMAL CT OF THE CHEST: ICD-10-CM

## 2021-06-21 DIAGNOSIS — Z86.711 HISTORY OF PULMONARY EMBOLUS (PE): ICD-10-CM

## 2021-06-21 DIAGNOSIS — R06.02 SHORTNESS OF BREATH: ICD-10-CM

## 2021-06-21 PROCEDURE — 94729 DIFFUSING CAPACITY: CPT | Performed by: INTERNAL MEDICINE

## 2021-06-21 PROCEDURE — 94726 PLETHYSMOGRAPHY LUNG VOLUMES: CPT | Performed by: INTERNAL MEDICINE

## 2021-06-21 PROCEDURE — 94010 BREATHING CAPACITY TEST: CPT | Performed by: INTERNAL MEDICINE

## 2021-06-21 PROCEDURE — 99214 OFFICE O/P EST MOD 30 MIN: CPT | Mod: 25 | Performed by: INTERNAL MEDICINE

## 2021-06-21 RX ORDER — ALBUTEROL SULFATE 2.5 MG/3ML
2.5 SOLUTION RESPIRATORY (INHALATION) EVERY 4 HOURS PRN
Qty: 120 ML | Refills: 11 | Status: SHIPPED | OUTPATIENT
Start: 2021-06-21

## 2021-06-21 RX ORDER — PANTOPRAZOLE SODIUM 40 MG/1
40 TABLET, DELAYED RELEASE ORAL DAILY
Qty: 90 TABLET | Refills: 3 | Status: SHIPPED | OUTPATIENT
Start: 2021-06-21 | End: 2021-09-14

## 2021-06-21 RX ORDER — OMEPRAZOLE 20 MG/1
20 CAPSULE, DELAYED RELEASE ORAL DAILY
Qty: 90 CAPSULE | Refills: 3 | Status: SHIPPED | OUTPATIENT
Start: 2021-06-21 | End: 2021-06-21

## 2021-06-21 ASSESSMENT — PULMONARY FUNCTION TESTS
FVC_PERCENT_PREDICTED: 105
FEV1/FVC_PERCENT_PREDICTED: 96
FEV1/FVC: 73
FEV1/FVC_PERCENT_PREDICTED: 95
FEV1/FVC: 73
FEV1/FVC_PERCENT_PREDICTED: 76
FEV1_PERCENT_PREDICTED: 101
FEV1/FVC_PERCENT_LLN: 64
FVC: 2.74
FVC_PREDICTED: 2.6
FVC_LLN: 2.18
FEV1_PREDICTED: 1.97
FEV1/FVC_PREDICTED: 77
FEV1_LLN: 1.65
FEV1: 2.01

## 2021-06-21 ASSESSMENT — FIBROSIS 4 INDEX
FIB4 SCORE: 1.2
FIB4 SCORE: 1.2

## 2021-06-21 NOTE — PROCEDURES
Technician: MERLYN Rae    Technician Comment:  Good patient effort & cooperation.  The results of this test meet the ATS/ERS standards for acceptability & reproducibility.  Test was performed on the [employer] Body Plethysmograph-Elite DX system.  Predicted values were GLI-2012 for spirometry, GLI-2017 for DLCO, ITS for Lung Volumes.  The DLCO was uncorrected for Hgb.      Interpretation:  Baseline spirometry shows normal airflows.  Bronchodilator testing was not performed.  Lung volumes are within normal limits.  Diffusion capacity is within normal limits.  Normal pulmonary function testing with normal flow volume loop.

## 2021-06-22 ASSESSMENT — ENCOUNTER SYMPTOMS
STRIDOR: 0
SORE THROAT: 0
DOUBLE VISION: 0
HEARTBURN: 0
EYE PAIN: 0
SINUS PAIN: 0
FOCAL WEAKNESS: 0
HEMOPTYSIS: 0
CONSTIPATION: 0
SPUTUM PRODUCTION: 1
EYE REDNESS: 0
PALPITATIONS: 0
DIARRHEA: 0
CLAUDICATION: 0
NAUSEA: 0
CHILLS: 0
EYE DISCHARGE: 0
DIAPHORESIS: 0
COUGH: 1
NECK PAIN: 0
WEIGHT LOSS: 0
DIZZINESS: 0
SPEECH CHANGE: 0
BLURRED VISION: 0
FEVER: 0
SHORTNESS OF BREATH: 1
ABDOMINAL PAIN: 0
VOMITING: 0
PHOTOPHOBIA: 0
WEAKNESS: 0
PND: 0
DEPRESSION: 0
FALLS: 0
MYALGIAS: 0
TREMORS: 0
BACK PAIN: 0
HEADACHES: 0
ORTHOPNEA: 0
WHEEZING: 0

## 2021-06-22 NOTE — PROGRESS NOTES
Chief Complaint   Patient presents with   • Follow-Up     LAST SEEN 5/19/21 vargas Ramos    • Results     PFT 6/21/21, VQ 6/4/21, CXR 6/4/21          HPI: This patient is a 81 y.o. female whom is followed in our clinic for cough and abnormal chest imaging last seen by APRN, Asha Malone, on 5/19/21.  The pt PMHx is significant for rectal Ca dx in 2012 s/p resection with recurrence in the liver in 2015 for which she has had multiple ablations to the R dome of the liver and is on chronic 5-FU. She does have a hx of PE and on xarelto chronically. She has been tx for COPD due to ? Cough with stiolto and Liana. She denies to me that the stiolto has helped and further has no e/o airflow obstruction on PFTs, is a life-long non-smoker. She has been using narcotics for chronic epigastric pain but was started on low dose PPI by Asha Malone and her pain resolved. Additionally she has fairly severe cough that is often productive, occurs both day and night. It is occasionally worse during or after eating. No wheezing although she does have SOB. Updated PFTs are wnls. TTE unremarkable. We have been following an area of abnormality at her R lung base that has slightly increased in size but is not FDG avid based on PET from 5/2020 and is situated directly over the liver dome where she has had multiple tx for liver metastasis. Pt does report poor eating habits (eats very little) and has had 10 lb weight loss over the past 6 weeks. Of note, VQ scan ordered during her last visit was low -probability.     Past Medical History:   Diagnosis Date   • Adverse effect of anesthesia     elevated BP postop   • Anesthesia    • Arrhythmia     occasional   • Blood clotting disorder (HCC) 08/2015    bilat PE    • Blood transfusion 1957   • Breath shortness     pt reports d/t chemo treatment; no O2; with moderate exertion; no c/o at this time   • Cancer (HCC) 09/2012    rectal cancer,  Liver CA-2015   • CATARACT     removed b/l   •  Chemotherapy-induced neutropenia (HCC) 9/28/2016   • Chickenpox     history of   • Colon cancer (HCC)    • Dental disorder     dentures UPPERS AND LOWERS   • Elevated alkaline phosphatase level 11/15/2017   • Emphysema of lung (HCC)     COPD   • Fall    • [place] measles     history of   • Heart murmur     as a child   • Hemorrhagic disorder (HCC)     on Xarelto    • High cholesterol    • Hypercholesteremia    • Hypertension     no meds currently    • Ileostomy in place (HCC)    • Ileostomy in place (HCC)    • Indigestion    • Influenza     history of   • Lightheadedness 9/17/2015   • Mumps     history of   • Obstruction     ileostomy   • Other specified symptom associated with female genital organs     HYSTERECTOMY 1993   • Pneumonia 05/2017    tx'd with antibx   • Pulmonary embolism (HCC)    • 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)    • Renal insufficiency 3/14/2016   • Restless legs 5/6/2020   • Routine general medical examination at a health care facility 3/14/2016    3/14/16   • Seasonal allergies    • Swelling of both ankles     Lasix PRN   • Tonsillitis        Social History     Socioeconomic History   • Marital status:      Spouse name: Not on file   • Number of children: Not on file   • Years of education: Not on file   • Highest education level: Not on file   Occupational History   • Not on file   Tobacco Use   • Smoking status: Never Smoker   • Smokeless tobacco: Never Used   Vaping Use   • Vaping Use: Never used   Substance and Sexual Activity   • Alcohol use: No   • Drug use: No   • Sexual activity: Never     Partners: Male     Birth control/protection: Post-Menopausal   Other Topics Concern   • Primary/coprimary nurse & associates Not Asked   • Family contact information Not Asked   • OK to release patient information to the following Not Asked   • Patient preferred routine/Privacy concerns Not Asked   • Patient likes and dislikes Not Asked   • Participating In Research Study Not Asked    • Miscellaneous Not Asked   Social History Narrative   • Not on file     Social Determinants of Health     Financial Resource Strain:    • Difficulty of Paying Living Expenses:    Food Insecurity:    • Worried About Running Out of Food in the Last Year:    • Ran Out of Food in the Last Year:    Transportation Needs:    • Lack of Transportation (Medical):    • Lack of Transportation (Non-Medical):    Physical Activity:    • Days of Exercise per Week:    • Minutes of Exercise per Session:    Stress:    • Feeling of Stress :    Social Connections:    • Frequency of Communication with Friends and Family:    • Frequency of Social Gatherings with Friends and Family:    • Attends Religion Services:    • Active Member of Clubs or Organizations:    • Attends Club or Organization Meetings:    • Marital Status:    Intimate Partner Violence:    • Fear of Current or Ex-Partner:    • Emotionally Abused:    • Physically Abused:    • Sexually Abused:        Family History   Problem Relation Age of Onset   • Heart Disease Mother    • Heart Failure Mother    • Hypertension Mother    • Hyperlipidemia Mother    • Cancer Mother    • Cancer Maternal Aunt 60        breast ca   • Lung Disease Brother         COPD/Emphysema/Smoker   • Cancer Brother         prostate cancer   • Alcohol/Drug Brother         Alcohol   • Kidney Disease Father         renal failure       Current Outpatient Medications on File Prior to Visit   Medication Sig Dispense Refill   • albuterol 108 (90 Base) MCG/ACT Aero Soln inhalation aerosol Inhale 2 Puffs every 6 hours as needed for Shortness of Breath. 1 Each 11   • potassium chloride ER (KLOR-CON) 10 MEQ tablet Take 1 tablet by mouth every day. 100 tablet 3   • rivaroxaban (XARELTO) 10 MG Tab tablet Take 1 Tab by mouth every day. 30 Tab 5   • lidocaine-prilocaine (EMLA) 2.5-2.5 % Cream Apply to port 1 hour prior to access of port and cover with plastic wrap. 30 g 3   • cyanocobalamin (VITAMIN B-12) 500 MCG Tab  Take 1,000 mcg by mouth every day.     • Multiple Vitamins-Minerals (CENTRUM SILVER PO) Take 1 Tab by mouth every day.     • Cholecalciferol (VITAMIN D3) 400 UNITS CAPS Take 1 Cap by mouth every day.     • loratadine (CLARITIN) 10 MG TABS Take 10 mg by mouth every day. Indications: Hayfever     • doxycycline (VIBRAMYCIN) 100 MG Cap Take 1 capsule by mouth 2 times a day. Take until gone. (Patient not taking: Reported on 6/21/2021) 20 capsule 0   • predniSONE (DELTASONE) 10 MG Tab Take 30mg x 3 days, then take 20mg x 3 days, then take 10mg x 3 days, with food, then discontinue. (Patient not taking: Reported on 6/21/2021) 18 tablet 0     No current facility-administered medications on file prior to visit.       Oxaliplatin, Codeine, Pcn [penicillins], Sulfa drugs, and Tape      ROS:   Review of Systems   Constitutional: Negative for chills, diaphoresis, fever, malaise/fatigue and weight loss.   HENT: Negative for congestion, ear discharge, ear pain, hearing loss, nosebleeds, sinus pain, sore throat and tinnitus.    Eyes: Negative for blurred vision, double vision, photophobia, pain, discharge and redness.   Respiratory: Positive for cough, sputum production and shortness of breath. Negative for hemoptysis, wheezing and stridor.    Cardiovascular: Negative for chest pain, palpitations, orthopnea, claudication, leg swelling and PND.   Gastrointestinal: Negative for abdominal pain, constipation, diarrhea, heartburn, nausea and vomiting.   Genitourinary: Negative for dysuria and urgency.   Musculoskeletal: Negative for back pain, falls, joint pain, myalgias and neck pain.   Skin: Negative for itching and rash.   Neurological: Negative for dizziness, tremors, speech change, focal weakness, weakness and headaches.   Endo/Heme/Allergies: Negative for environmental allergies.   Psychiatric/Behavioral: Negative for depression.       /60 (BP Location: Right arm, Patient Position: Sitting, BP Cuff Size: Adult)   Pulse (!)  "113   Temp 36.5 °C (97.7 °F) (Temporal)   Resp 18   Ht 1.651 m (5' 5\")   Wt 65.3 kg (144 lb)   SpO2 97%   Physical Exam  Vitals reviewed.   Constitutional:       General: She is not in acute distress.     Appearance: Normal appearance. She is well-developed and normal weight.   HENT:      Head: Normocephalic and atraumatic.      Right Ear: External ear normal.      Left Ear: External ear normal.      Nose: Nose normal. No congestion.      Mouth/Throat:      Mouth: Mucous membranes are moist.      Pharynx: Oropharynx is clear. No oropharyngeal exudate.   Eyes:      General: No scleral icterus.     Extraocular Movements: Extraocular movements intact.      Conjunctiva/sclera: Conjunctivae normal.      Pupils: Pupils are equal, round, and reactive to light.   Neck:      Vascular: No JVD.      Trachea: No tracheal deviation.   Cardiovascular:      Rate and Rhythm: Normal rate and regular rhythm.      Heart sounds: Normal heart sounds. No murmur heard.   No friction rub. No gallop.    Pulmonary:      Effort: Pulmonary effort is normal. No accessory muscle usage or respiratory distress.      Breath sounds: Normal breath sounds. No wheezing or rales.   Abdominal:      General: There is no distension.      Palpations: Abdomen is soft.      Tenderness: There is no abdominal tenderness.   Musculoskeletal:         General: No tenderness or deformity. Normal range of motion.      Cervical back: Normal range of motion and neck supple.      Right lower leg: No edema.      Left lower leg: No edema.   Lymphadenopathy:      Cervical: No cervical adenopathy.   Skin:     General: Skin is warm and dry.      Findings: No rash.      Nails: There is no clubbing.   Neurological:      Mental Status: She is alert and oriented to person, place, and time.      Cranial Nerves: No cranial nerve deficit.      Gait: Gait normal.   Psychiatric:         Mood and Affect: Mood normal.         Behavior: Behavior normal.         PFTs as reviewed by " me personally: as per HPI    Imaging as reviewed by me personally:  As per HPI    Assessment:  1. Gastroesophageal reflux disease, unspecified whether esophagitis present  pantoprazole (PROTONIX) 40 MG Tablet Delayed Response    REFERRAL TO SPEECH THERAPY    DISCONTINUED: omeprazole (PRILOSEC) 20 MG delayed-release capsule   2. History of pulmonary embolus (PE)     3. Abnormal CT of the chest     4. Cough  pantoprazole (PROTONIX) 40 MG Tablet Delayed Response    albuterol (PROVENTIL) 2.5mg/3ml Nebu Soln solution for nebulization    DME Nebulizer    REFERRAL TO SPEECH THERAPY       Plan:  1. This pt likely has GERD based on epigstric pain with resolution with PPI and cough which I suspect is due to reflux and possible aspiraiton either only retrograde or both anterograde and retrograde. I am recommending prescription strength PPI and we discussed lifestyle modifications. Referral to speech.   2. On chronic anticoagualtion. Low prob VQ  3. This is likely scarring related to her tx for liver Ca. It is chronic, not FDG avid and unlikely infx given chronicity and focality. I think we can continue to observe. In the meantime we will focus on her cough.  4. I suspect GERD vs. Aspiration. Prescription strength PPI and referral to speech therapy. I okay'd pt to stop stiolto and continue prn MATEO.   Return in about 3 months (around 9/21/2021) for gregg Malone.

## 2021-06-24 NOTE — PROGRESS NOTES
"06/28/21    Subjective    Chief Complaint:  Fatigue and profound dyspnea    HPI:  See note of 6/14/21. 81 female with history of rectal carcinoma KRAS mutated, MSI stable, diagnosed in 2012. Abdominoperineal resection followed by liver and lung metastases in 2015. Intolerant of Oxaliplatin and Xeloda, has been on infusion 5FU for years. Now profoundly SOB despite adequate O2 saturations, normal ECHOcardiogram and negative chest CTs and VQ scan. She has had a fair amount of liver directed therapies including Y90 and ablations. Saw Dr. Ventura (pulmonary) who thinks her SOB is GERD with aspiration and prescribed H2 blockers. Very fatigued and dyspneic.     ROS:    Constitutional: No weight loss  Skin: No rash or jaundice  HENT: No change in eyesight or hearing  Cardiovascular:No chest pain or arrythmia  Respiratory:See PI  GI:Chronic diarrhea (ileostomy)  :No dysuria or frequency  Musculoskeletal:No bone or joint pain  Neuro:No sx's of neuropathy  Psych: No complaints    PMH:      Allergies   Allergen Reactions   • Oxaliplatin Anaphylaxis   • Codeine      \"gets drunk\"   • Pcn [Penicillins] Itching     itching   • Sulfa Drugs Itching     itching   • Tape Rash     PAPER TAPE OK       Past Medical History:   Diagnosis Date   • Adverse effect of anesthesia     elevated BP postop   • Anesthesia    • Arrhythmia     occasional   • Blood clotting disorder (HCC) 08/2015    bilat PE    • Blood transfusion 1957   • Breath shortness     pt reports d/t chemo treatment; no O2; with moderate exertion; no c/o at this time   • Cancer (HCC) 09/2012    rectal cancer,  Liver CA-2015   • CATARACT     removed b/l   • Chemotherapy-induced neutropenia (HCC) 9/28/2016   • Chickenpox     history of   • Colon cancer (HCC)    • Dental disorder     dentures UPPERS AND LOWERS   • Elevated alkaline phosphatase level 11/15/2017   • Emphysema of lung (HCC)     COPD   • Fall    • Swazi measles     history of   • Heart murmur     as a child   • " Hemorrhagic disorder (HCC)     on Xarelto    • High cholesterol    • Hypercholesteremia    • Hypertension     no meds currently    • Ileostomy in place (HCC)    • Ileostomy in place (HCC)    • Indigestion    • Influenza     history of   • Lightheadedness 9/17/2015   • Mumps     history of   • Obstruction     ileostomy   • Other specified symptom associated with female genital organs     HYSTERECTOMY 1993   • Pneumonia 05/2017    tx'd with antibx   • Pulmonary embolism (HCC)    • 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)    • Renal insufficiency 3/14/2016   • Restless legs 5/6/2020   • Routine general medical examination at a [facility] care facility 3/14/2016    3/14/16   • Seasonal allergies    • Swelling of both ankles     Lasix PRN   • Tonsillitis         Past Surgical History:   Procedure Laterality Date   • HEPATIC ABLATION  5/28/2020    Procedure: ABLATION, LESION, LIVER-TRISEGMENTECTOMY, MICROWAVE ABLATION, INTRA OPERATIVE ULTRA SOUND, SIMPLE RESECTED / REPAIR OF DIAPHRAGM;  Surgeon: Kit West M.D.;  Location: SURGERY [facility];  Service: General   • HEPATIC ABLATION LAPAROSCOPIC  11/15/2018    Procedure: HEPATIC ABLATION LAPAROSCOPIC.- SEGMENT 7/8;  Surgeon: Kit West M.D.;  Location: SURGERY [facility];  Service: General   • COLONOSCOPY WITH BIOPSY  8/23/2015    Procedure: COLONOSCOPY WITH BIOPSY;  Surgeon: Wilbur Glasgow M.D.;  Location: ENDOSCOPY [facility];  Service:    • HEPATIC ABLATION LAPAROSCOPIC  7/6/2015    Procedure: HEPATIC ABLATION LAPAROSCOPIC.;  Surgeon: Kit West M.D.;  Location: SURGERY [facility];  Service:    • CATH PLACEMENT Left 7/6/2015    Procedure: CATH PLACEMENT CEPHALIC POWERPORT;  Surgeon: Kit West M.D.;  Location: SURGERY [facility];  Service:    • FISTULA IN ANO REPAIR  1/28/2015    Performed by Kolton Montgomery M.D. at [facility]   • PERINEAL PROCEDURE  1/28/2015    Performed by  Kolton oMntgomery M.D. at [facility]   • FISTULA IN ANO REPAIR  10/15/2014    Performed by Kolton Montgomery M.D. at SURGERY [facility]   • PERINEAL PROCEDURE  10/15/2014    Performed by Kolton Montgomery M.D. at [facility]   • IRRIGATION & DEBRIDEMENT GENERAL  2/19/2014    Performed by Kolton Montgomery M.D. at SURGERY [facility]   • FLAP GRAFT  2/19/2014    Performed by Kolton Montgomery M.D. at [facility]   • PERINEAL PROCEDURE  12/18/2013    Performed by Kolton Montgomery M.D. at [facility]   • MYOCUTANEOUS FLAP  12/18/2013    Performed by Kolton Montgomery M.D. at [facility]   • IRRIGATION & DEBRIDEMENT GENERAL  10/23/2013    Performed by Kolton Montgomery M.D. at [facility]   • FISTULA IN ANO REPAIR  9/4/2013    Performed by Kolton Montgomery M.D. at [facility]   • FLAP ROTATION  9/4/2013    Performed by Kolton Montgomery M.D. at [facility]   • RECOVERY  8/26/2013    Performed by [facility] at [facility] SAME DAY [facility]   • BIOPSY GENERAL  7/17/2013    Performed by Kolton Montgomery M.D. at [facility]   • PROCTOSCOPY  7/17/2013    Performed by Kolton Montgomery M.D. at [facility]   • FISTULA IN ANO REPAIR  2/27/2013    Performed by Kolton Montgomery M.D. at [facility]   • SIGMOIDOSCOPY  2/27/2013    Performed by Kolton Montgomery M.D. at [facility]   • LAPAROSCOPY  10/8/2012    Performed by Kolton Montgomery M.D. at [facility]   • ILEO LOOP DIVERSION  10/8/2012    Performed by Kolton Montgomery M.D. at [facility]   • COLECTOMY  9/26/2012    Performed by Kolton Montgomery M.D. at [facility]   • COLONOSCOPY FLEX W/ENDO US  9/20/2012    Performed by Harshil Bolton M.D. at SURGERY [facility]   • OTHER  2009    left eye torn retina repair   • CATARACT EXTRACTION WITH IOL  2004    bilateral   • ABDOMINAL HYSTERECTOMY TOTAL  1983   • CYST EXCISION  1972     ovarian   • COLON RESECTION     • EYE SURGERY      cataracts   • HYSTERECTOMY LAPAROSCOPY     • PB REMV 2ND CATARACT,CORN-SCLER SECTN     • TONSILLECTOMY          Medications:    Current Outpatient Medications on File Prior to Visit   Medication Sig Dispense Refill   • pantoprazole (PROTONIX) 40 MG Tablet Delayed Response Take 1 tablet by mouth every day. 90 tablet 3   • albuterol (PROVENTIL) 2.5mg/3ml Nebu Soln solution for nebulization Take 3 mL by nebulization every four hours as needed for Shortness of Breath. 120 mL 11   • albuterol 108 (90 Base) MCG/ACT Aero Soln inhalation aerosol Inhale 2 Puffs every 6 hours as needed for Shortness of Breath. 1 Each 11   • potassium chloride ER (KLOR-CON) 10 MEQ tablet Take 1 tablet by mouth every day. 100 tablet 3   • rivaroxaban (XARELTO) 10 MG Tab tablet Take 1 Tab by mouth every day. 30 Tab 5   • lidocaine-prilocaine (EMLA) 2.5-2.5 % Cream Apply to port 1 hour prior to access of port and cover with plastic wrap. 30 g 3   • cyanocobalamin (VITAMIN B-12) 500 MCG Tab Take 1,000 mcg by mouth every day.     • Multiple Vitamins-Minerals (CENTRUM SILVER PO) Take 1 Tab by mouth every day.     • Cholecalciferol (VITAMIN D3) 400 UNITS CAPS Take 1 Cap by mouth every day.     • loratadine (CLARITIN) 10 MG TABS Take 10 mg by mouth every day. Indications: Hayfever       No current facility-administered medications on file prior to visit.       Social History     Tobacco Use   • Smoking status: Never Smoker   • Smokeless tobacco: Never Used   Substance Use Topics   • Alcohol use: No        Family History   Problem Relation Age of Onset   • Heart Disease Mother    • Heart Failure Mother    • Hypertension Mother    • Hyperlipidemia Mother    • Cancer Mother    • Cancer Maternal Aunt 60        breast ca   • Lung Disease Brother         COPD/Emphysema/Smoker   • Cancer Brother         prostate cancer   • Alcohol/Drug Brother         Alcohol   • Kidney Disease Father         renal failure  "       Objective    Vitals:    /68 (BP Location: Right arm, Patient Position: Sitting, BP Cuff Size: Adult)   Pulse (!) 124   Temp 36.9 °C (98.5 °F) (Temporal)   Resp 18   Ht 1.651 m (5' 5\")   Wt 66.9 kg (147 lb 7.8 oz)   LMP  (LMP Unknown)   SpO2 95%   BMI 24.54 kg/m²     Physical Exam:    Appears well-developed and well-nourished. Pale  Head -  Normocephalic .   Eyes - Pupils are equal.. Conjunctivae normal. No scleral icterus.   Ears - normal hearing  Neck - Normal range of motion. Neck supple. No thyromegaly  Cardiovascular - Tachycardia. Normal heart sounds and intact distal pulses. No  gallop, murmur or rub  Pulmonary - Normal breath sounds.  No wheeze, rales or rhonci  Breast - Not examined  Abdominal -Not examined  Extremities-  No edema or tenderness.    Nodes - No submental, submandibular, preauricular, cervical, axillary or inguinal adenopathy.    Neurological -   Alert and oriented.  Skin - Skin is warm and dry. No rash noted. Not diaphoretic. No erythema. No pallor. No jaundice   Psychiatric -  Normal mood and affect.    Labs:    Results for KANG HALL (MRN [id number])    Ref. Range 6/1/2021 07:15   WBC Latest Ref Range: 4.8 - 10.8 K/uL 11.6 (H)   RBC Latest Ref Range: 4.20 - 5.40 M/uL 4.53   Hemoglobin Latest Ref Range: 12.0 - 16.0 g/dL 11.5 (L)   Hematocrit Latest Ref Range: 37.0 - 47.0 % 37.9   MCV Latest Ref Range: 81.4 - 97.8 fL 83.7   MCH Latest Ref Range: 27.0 - 33.0 pg 25.4 (L)   MCHC Latest Ref Range: 33.6 - 35.0 g/dL 30.3 (L)   RDW Latest Ref Range: 35.9 - 50.0 fL 61.6 (H)   Platelet Count Latest Ref Range: 164 - 446 K/uL 332   Results for KANG HALL (MRN [id number])    Ref. Range 6/1/2021 07:15   WBC Latest Ref Range: 4.8 - 10.8 K/uL 11.6 (H)   RBC Latest Ref Range: 4.20 - 5.40 M/uL 4.53   Hemoglobin Latest Ref Range: 12.0 - 16.0 g/dL 11.5 (L)   Hematocrit Latest Ref Range: 37.0 - 47.0 % 37.9   MCV Latest Ref Range: 81.4 - 97.8 fL 83.7   MCH Latest Ref Range: 27.0 - " 33.0 pg 25.4 (L)   MCHC Latest Ref Range: 33.6 - 35.0 g/dL 30.3 (L)   RDW Latest Ref Range: 35.9 - 50.0 fL 61.6 (H)   Platelet Count Latest Ref Range: 164 - 446 K/uL 332   Results for KANG HALL (MRN [id number])    Ref. Range 5/18/2021 07:30 5/31/2021 06:45 6/1/2021 07:15   Carcinoembryonic Antigen Latest Ref Range: 0.0 - 3.0 ng/mL 2.3  1.7     Assessment    Imp:    Visit Diagnosis:    1. Malignant neoplasm of rectum (HCC)  CBC WITH DIFFERENTIAL    Comp Metabolic Panel    CEA   2. Secondary malignant neoplasm of liver (HCC)     3. History of pulmonary embolus (PE)     4. SOB (shortness of breath)           Plan:  Long discussion. Not willing to consider discontinuing therapy. Has had enough 5FU. Will concoct an Irinotecan/Avastin regimen.  Chemoeducation (virtual)  Start chemo week after next      Lucho Steele M.D.

## 2021-06-28 ENCOUNTER — OFFICE VISIT (OUTPATIENT)
Dept: HEMATOLOGY ONCOLOGY | Facility: MEDICAL CENTER | Age: 81
End: 2021-06-28
Payer: MEDICARE

## 2021-06-28 ENCOUNTER — TELEPHONE (OUTPATIENT)
Dept: HEMATOLOGY ONCOLOGY | Facility: MEDICAL CENTER | Age: 81
End: 2021-06-28

## 2021-06-28 ENCOUNTER — OUTPATIENT INFUSION SERVICES (OUTPATIENT)
Dept: ONCOLOGY | Facility: MEDICAL CENTER | Age: 81
End: 2021-06-28
Attending: INTERNAL MEDICINE
Payer: MEDICARE

## 2021-06-28 VITALS
SYSTOLIC BLOOD PRESSURE: 113 MMHG | BODY MASS INDEX: 25.18 KG/M2 | RESPIRATION RATE: 18 BRPM | WEIGHT: 147.49 LBS | TEMPERATURE: 98.2 F | HEIGHT: 64 IN | DIASTOLIC BLOOD PRESSURE: 78 MMHG | OXYGEN SATURATION: 97 % | HEART RATE: 124 BPM

## 2021-06-28 VITALS
TEMPERATURE: 98.5 F | OXYGEN SATURATION: 95 % | HEIGHT: 65 IN | BODY MASS INDEX: 24.57 KG/M2 | WEIGHT: 147.49 LBS | HEART RATE: 124 BPM | RESPIRATION RATE: 18 BRPM | SYSTOLIC BLOOD PRESSURE: 118 MMHG | DIASTOLIC BLOOD PRESSURE: 68 MMHG

## 2021-06-28 DIAGNOSIS — C20 MALIGNANT NEOPLASM OF RECTUM (HCC): ICD-10-CM

## 2021-06-28 DIAGNOSIS — R06.02 SOB (SHORTNESS OF BREATH): ICD-10-CM

## 2021-06-28 DIAGNOSIS — Z86.711 HISTORY OF PULMONARY EMBOLUS (PE): ICD-10-CM

## 2021-06-28 DIAGNOSIS — C78.7 SECONDARY MALIGNANT NEOPLASM OF LIVER (HCC): ICD-10-CM

## 2021-06-28 LAB
ALBUMIN SERPL BCP-MCNC: 3.4 G/DL (ref 3.2–4.9)
ALBUMIN/GLOB SERPL: 1 G/DL
ALP SERPL-CCNC: 203 U/L (ref 30–99)
ALT SERPL-CCNC: 20 U/L (ref 2–50)
ANION GAP SERPL CALC-SCNC: 16 MMOL/L (ref 7–16)
AST SERPL-CCNC: 29 U/L (ref 12–45)
BASOPHILS # BLD AUTO: 0.7 % (ref 0–1.8)
BASOPHILS # BLD: 0.07 K/UL (ref 0–0.12)
BILIRUB SERPL-MCNC: 0.4 MG/DL (ref 0.1–1.5)
BUN SERPL-MCNC: 14 MG/DL (ref 8–22)
CALCIUM SERPL-MCNC: 9.6 MG/DL (ref 8.5–10.5)
CEA SERPL-MCNC: 2.5 NG/ML (ref 0–3)
CHLORIDE SERPL-SCNC: 106 MMOL/L (ref 96–112)
CO2 SERPL-SCNC: 16 MMOL/L (ref 20–33)
CREAT SERPL-MCNC: 0.91 MG/DL (ref 0.5–1.4)
EOSINOPHIL # BLD AUTO: 0.36 K/UL (ref 0–0.51)
EOSINOPHIL NFR BLD: 3.7 % (ref 0–6.9)
ERYTHROCYTE [DISTWIDTH] IN BLOOD BY AUTOMATED COUNT: 64.8 FL (ref 35.9–50)
GLOBULIN SER CALC-MCNC: 3.4 G/DL (ref 1.9–3.5)
GLUCOSE SERPL-MCNC: 90 MG/DL (ref 65–99)
HCT VFR BLD AUTO: 37.2 % (ref 37–47)
HGB BLD-MCNC: 11.7 G/DL (ref 12–16)
IMM GRANULOCYTES # BLD AUTO: 0.09 K/UL (ref 0–0.11)
IMM GRANULOCYTES NFR BLD AUTO: 0.9 % (ref 0–0.9)
LYMPHOCYTES # BLD AUTO: 1.51 K/UL (ref 1–4.8)
LYMPHOCYTES NFR BLD: 15.6 % (ref 22–41)
MCH RBC QN AUTO: 26.3 PG (ref 27–33)
MCHC RBC AUTO-ENTMCNC: 31.5 G/DL (ref 33.6–35)
MCV RBC AUTO: 83.6 FL (ref 81.4–97.8)
MONOCYTES # BLD AUTO: 0.91 K/UL (ref 0–0.85)
MONOCYTES NFR BLD AUTO: 9.4 % (ref 0–13.4)
NEUTROPHILS # BLD AUTO: 6.72 K/UL (ref 2–7.15)
NEUTROPHILS NFR BLD: 69.7 % (ref 44–72)
NRBC # BLD AUTO: 0 K/UL
NRBC BLD-RTO: 0 /100 WBC
PLATELET # BLD AUTO: 274 K/UL (ref 164–446)
PMV BLD AUTO: 10.1 FL (ref 9–12.9)
POTASSIUM SERPL-SCNC: 3.8 MMOL/L (ref 3.6–5.5)
PROT SERPL-MCNC: 6.8 G/DL (ref 6–8.2)
RBC # BLD AUTO: 4.45 M/UL (ref 4.2–5.4)
SODIUM SERPL-SCNC: 138 MMOL/L (ref 135–145)
WBC # BLD AUTO: 9.7 K/UL (ref 4.8–10.8)

## 2021-06-28 PROCEDURE — 85025 COMPLETE CBC W/AUTO DIFF WBC: CPT

## 2021-06-28 PROCEDURE — 80053 COMPREHEN METABOLIC PANEL: CPT

## 2021-06-28 PROCEDURE — 36415 COLL VENOUS BLD VENIPUNCTURE: CPT

## 2021-06-28 PROCEDURE — 99214 OFFICE O/P EST MOD 30 MIN: CPT | Performed by: INTERNAL MEDICINE

## 2021-06-28 PROCEDURE — 82378 CARCINOEMBRYONIC ANTIGEN: CPT

## 2021-06-28 ASSESSMENT — FIBROSIS 4 INDEX
FIB4 SCORE: 1.2
FIB4 SCORE: 1.2

## 2021-06-28 ASSESSMENT — PAIN SCALES - GENERAL: PAINLEVEL: 2=MINIMAL-SLIGHT

## 2021-06-28 NOTE — TELEPHONE ENCOUNTER
Jeffrey calling to confirm what patient just told her.  Patient called Jeffrey to CANCEL her chemo for tomorrow.  Is this true?

## 2021-06-29 ENCOUNTER — APPOINTMENT (OUTPATIENT)
Dept: ONCOLOGY | Facility: MEDICAL CENTER | Age: 81
End: 2021-06-29
Attending: INTERNAL MEDICINE
Payer: MEDICARE

## 2021-06-29 ENCOUNTER — OFFICE VISIT (OUTPATIENT)
Dept: CARDIOLOGY | Facility: MEDICAL CENTER | Age: 81
End: 2021-06-29
Payer: MEDICARE

## 2021-06-29 ENCOUNTER — OFFICE VISIT (OUTPATIENT)
Dept: HEMATOLOGY ONCOLOGY | Facility: MEDICAL CENTER | Age: 81
End: 2021-06-29
Payer: MEDICARE

## 2021-06-29 VITALS
SYSTOLIC BLOOD PRESSURE: 100 MMHG | WEIGHT: 148.8 LBS | DIASTOLIC BLOOD PRESSURE: 60 MMHG | OXYGEN SATURATION: 96 % | HEART RATE: 102 BPM | HEIGHT: 65 IN | BODY MASS INDEX: 24.79 KG/M2

## 2021-06-29 VITALS
HEART RATE: 118 BPM | DIASTOLIC BLOOD PRESSURE: 72 MMHG | RESPIRATION RATE: 14 BRPM | HEIGHT: 64 IN | WEIGHT: 147.16 LBS | TEMPERATURE: 98 F | SYSTOLIC BLOOD PRESSURE: 132 MMHG | BODY MASS INDEX: 25.12 KG/M2 | OXYGEN SATURATION: 96 %

## 2021-06-29 DIAGNOSIS — R06.02 SHORTNESS OF BREATH: ICD-10-CM

## 2021-06-29 DIAGNOSIS — C20 MALIGNANT NEOPLASM OF RECTUM (HCC): ICD-10-CM

## 2021-06-29 DIAGNOSIS — R19.7 DIARRHEA, UNSPECIFIED TYPE: ICD-10-CM

## 2021-06-29 DIAGNOSIS — R42 LIGHTHEADEDNESS: ICD-10-CM

## 2021-06-29 DIAGNOSIS — D70.1 CHEMOTHERAPY-INDUCED NEUTROPENIA (HCC): ICD-10-CM

## 2021-06-29 DIAGNOSIS — Z79.899 HIGH RISK MEDICATION USE: ICD-10-CM

## 2021-06-29 DIAGNOSIS — R94.4 DECREASED GFR: ICD-10-CM

## 2021-06-29 DIAGNOSIS — Z79.01 CURRENT USE OF LONG TERM ANTICOAGULATION: Chronic | ICD-10-CM

## 2021-06-29 DIAGNOSIS — C78.7 SECONDARY MALIGNANT NEOPLASM OF LIVER (HCC): Chronic | ICD-10-CM

## 2021-06-29 DIAGNOSIS — C18.9 METASTATIC COLON CANCER IN FEMALE: ICD-10-CM

## 2021-06-29 DIAGNOSIS — I10 ESSENTIAL HYPERTENSION: ICD-10-CM

## 2021-06-29 DIAGNOSIS — Z86.711 HISTORY OF PULMONARY EMBOLUS (PE): ICD-10-CM

## 2021-06-29 DIAGNOSIS — N28.9 RENAL INSUFFICIENCY: ICD-10-CM

## 2021-06-29 DIAGNOSIS — Z71.89 ENCOUNTER FOR MEDICATION COUNSELING: ICD-10-CM

## 2021-06-29 DIAGNOSIS — T45.1X5A CHEMOTHERAPY-INDUCED NEUTROPENIA (HCC): ICD-10-CM

## 2021-06-29 DIAGNOSIS — G25.81 RESTLESS LEGS: ICD-10-CM

## 2021-06-29 DIAGNOSIS — E78.5 HYPERLIPIDEMIA, UNSPECIFIED HYPERLIPIDEMIA TYPE: ICD-10-CM

## 2021-06-29 DIAGNOSIS — E86.0 DEHYDRATION: ICD-10-CM

## 2021-06-29 PROCEDURE — 99214 OFFICE O/P EST MOD 30 MIN: CPT | Performed by: NURSE PRACTITIONER

## 2021-06-29 PROCEDURE — 99214 OFFICE O/P EST MOD 30 MIN: CPT | Performed by: INTERNAL MEDICINE

## 2021-06-29 RX ORDER — 0.9 % SODIUM CHLORIDE 0.9 %
3 VIAL (ML) INJECTION PRN
Status: CANCELLED | OUTPATIENT
Start: 2021-07-06

## 2021-06-29 RX ORDER — DEXTROSE MONOHYDRATE 50 MG/ML
INJECTION, SOLUTION INTRAVENOUS CONTINUOUS
Status: CANCELLED | OUTPATIENT
Start: 2021-08-17

## 2021-06-29 RX ORDER — PROCHLORPERAZINE MALEATE 10 MG
10 TABLET ORAL EVERY 6 HOURS PRN
Status: CANCELLED | OUTPATIENT
Start: 2021-08-17

## 2021-06-29 RX ORDER — SODIUM CHLORIDE 9 MG/ML
INJECTION, SOLUTION INTRAVENOUS CONTINUOUS
Status: CANCELLED | OUTPATIENT
Start: 2021-07-06

## 2021-06-29 RX ORDER — ONDANSETRON 8 MG/1
8 TABLET, ORALLY DISINTEGRATING ORAL PRN
Status: CANCELLED | OUTPATIENT
Start: 2021-07-06

## 2021-06-29 RX ORDER — HEPARIN SODIUM (PORCINE) LOCK FLUSH IV SOLN 100 UNIT/ML 100 UNIT/ML
500 SOLUTION INTRAVENOUS PRN
Status: CANCELLED | OUTPATIENT
Start: 2021-08-17

## 2021-06-29 RX ORDER — ONDANSETRON 4 MG/1
4 TABLET, FILM COATED ORAL EVERY 4 HOURS PRN
Qty: 30 TABLET | Refills: 6 | Status: SHIPPED | OUTPATIENT
Start: 2021-06-29

## 2021-06-29 RX ORDER — LOPERAMIDE HYDROCHLORIDE 2 MG/1
2 CAPSULE ORAL
Status: CANCELLED | OUTPATIENT
Start: 2021-07-06

## 2021-06-29 RX ORDER — ONDANSETRON 2 MG/ML
4 INJECTION INTRAMUSCULAR; INTRAVENOUS PRN
Status: CANCELLED | OUTPATIENT
Start: 2021-08-17

## 2021-06-29 RX ORDER — EPINEPHRINE 1 MG/ML(1)
0.5 AMPUL (ML) INJECTION PRN
Status: CANCELLED | OUTPATIENT
Start: 2021-07-06

## 2021-06-29 RX ORDER — LIDOCAINE AND PRILOCAINE 25; 25 MG/G; MG/G
CREAM TOPICAL
Qty: 1 EACH | Refills: 3 | Status: SHIPPED | OUTPATIENT
Start: 2021-06-29

## 2021-06-29 RX ORDER — DIPHENHYDRAMINE HYDROCHLORIDE 50 MG/ML
50 INJECTION INTRAMUSCULAR; INTRAVENOUS PRN
Status: CANCELLED | OUTPATIENT
Start: 2021-08-17

## 2021-06-29 RX ORDER — 0.9 % SODIUM CHLORIDE 0.9 %
3 VIAL (ML) INJECTION PRN
Status: CANCELLED | OUTPATIENT
Start: 2021-08-17

## 2021-06-29 RX ORDER — SODIUM CHLORIDE 9 MG/ML
INJECTION, SOLUTION INTRAVENOUS CONTINUOUS
Status: CANCELLED | OUTPATIENT
Start: 2021-08-17

## 2021-06-29 RX ORDER — 0.9 % SODIUM CHLORIDE 0.9 %
10 VIAL (ML) INJECTION PRN
Status: CANCELLED | OUTPATIENT
Start: 2021-07-06

## 2021-06-29 RX ORDER — METHYLPREDNISOLONE SODIUM SUCCINATE 125 MG/2ML
125 INJECTION, POWDER, LYOPHILIZED, FOR SOLUTION INTRAMUSCULAR; INTRAVENOUS PRN
Status: CANCELLED | OUTPATIENT
Start: 2021-07-06

## 2021-06-29 RX ORDER — 0.9 % SODIUM CHLORIDE 0.9 %
3 VIAL (ML) INJECTION PRN
Status: CANCELLED | OUTPATIENT
Start: 2021-07-19

## 2021-06-29 RX ORDER — 0.9 % SODIUM CHLORIDE 0.9 %
10 VIAL (ML) INJECTION PRN
Status: CANCELLED | OUTPATIENT
Start: 2021-08-17

## 2021-06-29 RX ORDER — EPINEPHRINE 1 MG/ML(1)
0.5 AMPUL (ML) INJECTION PRN
Status: CANCELLED | OUTPATIENT
Start: 2021-08-17

## 2021-06-29 RX ORDER — LOPERAMIDE HYDROCHLORIDE 2 MG/1
2 CAPSULE ORAL SEE ADMIN INSTRUCTIONS
Qty: 30 CAPSULE | Refills: 11 | Status: SHIPPED | OUTPATIENT
Start: 2021-06-29 | End: 2021-07-13

## 2021-06-29 RX ORDER — ONDANSETRON 8 MG/1
8 TABLET, ORALLY DISINTEGRATING ORAL PRN
Status: CANCELLED | OUTPATIENT
Start: 2021-08-17

## 2021-06-29 RX ORDER — 0.9 % SODIUM CHLORIDE 0.9 %
10 VIAL (ML) INJECTION PRN
Status: CANCELLED | OUTPATIENT
Start: 2021-07-19

## 2021-06-29 RX ORDER — METHYLPREDNISOLONE SODIUM SUCCINATE 125 MG/2ML
125 INJECTION, POWDER, LYOPHILIZED, FOR SOLUTION INTRAMUSCULAR; INTRAVENOUS PRN
Status: CANCELLED | OUTPATIENT
Start: 2021-08-17

## 2021-06-29 RX ORDER — HEPARIN SODIUM (PORCINE) LOCK FLUSH IV SOLN 100 UNIT/ML 100 UNIT/ML
500 SOLUTION INTRAVENOUS PRN
Status: CANCELLED | OUTPATIENT
Start: 2021-07-19

## 2021-06-29 RX ORDER — 0.9 % SODIUM CHLORIDE 0.9 %
VIAL (ML) INJECTION PRN
Status: CANCELLED | OUTPATIENT
Start: 2021-07-06

## 2021-06-29 RX ORDER — HEPARIN SODIUM (PORCINE) LOCK FLUSH IV SOLN 100 UNIT/ML 100 UNIT/ML
500 SOLUTION INTRAVENOUS PRN
Status: CANCELLED | OUTPATIENT
Start: 2021-07-06

## 2021-06-29 RX ORDER — PROCHLORPERAZINE MALEATE 10 MG
10 TABLET ORAL EVERY 6 HOURS PRN
Qty: 30 TABLET | Refills: 6 | Status: SHIPPED | OUTPATIENT
Start: 2021-06-29 | End: 2021-07-19

## 2021-06-29 RX ORDER — 0.9 % SODIUM CHLORIDE 0.9 %
VIAL (ML) INJECTION PRN
Status: CANCELLED | OUTPATIENT
Start: 2021-07-19

## 2021-06-29 RX ORDER — PROCHLORPERAZINE MALEATE 10 MG
10 TABLET ORAL EVERY 6 HOURS PRN
Status: CANCELLED | OUTPATIENT
Start: 2021-07-06

## 2021-06-29 RX ORDER — DIPHENHYDRAMINE HYDROCHLORIDE 50 MG/ML
50 INJECTION INTRAMUSCULAR; INTRAVENOUS PRN
Status: CANCELLED | OUTPATIENT
Start: 2021-07-06

## 2021-06-29 RX ORDER — ONDANSETRON 2 MG/ML
4 INJECTION INTRAMUSCULAR; INTRAVENOUS PRN
Status: CANCELLED | OUTPATIENT
Start: 2021-07-06

## 2021-06-29 RX ORDER — LOPERAMIDE HYDROCHLORIDE 2 MG/1
4 CAPSULE ORAL PRN
Status: CANCELLED | OUTPATIENT
Start: 2021-08-17

## 2021-06-29 RX ORDER — LOPERAMIDE HYDROCHLORIDE 2 MG/1
4 CAPSULE ORAL PRN
Status: CANCELLED | OUTPATIENT
Start: 2021-07-06

## 2021-06-29 RX ORDER — LOPERAMIDE HYDROCHLORIDE 2 MG/1
2 CAPSULE ORAL
Status: CANCELLED | OUTPATIENT
Start: 2021-08-17

## 2021-06-29 RX ORDER — 0.9 % SODIUM CHLORIDE 0.9 %
VIAL (ML) INJECTION PRN
Status: CANCELLED | OUTPATIENT
Start: 2021-08-17

## 2021-06-29 RX ORDER — DEXTROSE MONOHYDRATE 50 MG/ML
INJECTION, SOLUTION INTRAVENOUS CONTINUOUS
Status: CANCELLED | OUTPATIENT
Start: 2021-07-06

## 2021-06-29 ASSESSMENT — ENCOUNTER SYMPTOMS
HEMOPTYSIS: 0
ORTHOPNEA: 0
GASTROINTESTINAL NEGATIVE: 1
DIZZINESS: 0
RESPIRATORY NEGATIVE: 1
LOSS OF CONSCIOUSNESS: 0
PALPITATIONS: 0
MUSCULOSKELETAL NEGATIVE: 1
BRUISES/BLEEDS EASILY: 0
EYES NEGATIVE: 1
WEAKNESS: 0
SORE THROAT: 0
CHILLS: 0
STRIDOR: 0
CONSTITUTIONAL NEGATIVE: 1
FEVER: 0
CLAUDICATION: 0
SHORTNESS OF BREATH: 0
WHEEZING: 0
SPUTUM PRODUCTION: 0
COUGH: 0
PND: 0
CARDIOVASCULAR NEGATIVE: 1
NEUROLOGICAL NEGATIVE: 1

## 2021-06-29 ASSESSMENT — FIBROSIS 4 INDEX
FIB4 SCORE: 1.92
FIB4 SCORE: 1.92

## 2021-06-29 NOTE — PROGRESS NOTES
Chief Complaint   Patient presents with   • Paroxysmal Supraventricular Tachycardia (PSVT)       Subjective:   Karlene Walters is a 79 y.o. female who presents today as a follow-up for her PE heart murmur shortness of breath and lower extremity edema.  Since he was last seen she was seen in the oncology center today but she was noted to be profoundly short of breath.    Since he was last seen we admitted to the hospital for symptomatic shortness of breath.  Her work-up showed essentially nothing.  She does continue to have some unknown reason low bicarbonate.  Is unclear whether not this is respiratory driven.  She has never had an ABG.  This is a new finding since she started complaining of worsening shortness of breath.  Her VQ scan was negative.      Past Medical History:   Diagnosis Date   • Adverse effect of anesthesia     elevated BP postop   • Anesthesia    • Arrhythmia     occasional   • Blood clotting disorder (HCC) 08/2015    bilat PE    • Blood transfusion 1957   • Breath shortness     pt reports d/t chemo treatment; no O2; with moderate exertion; no c/o at this time   • Cancer (HCC) 09/2012    rectal cancer,  Liver CA-2015   • CATARACT     removed b/l   • Chemotherapy-induced neutropenia (HCC) 9/28/2016   • Chickenpox     history of   • Colon cancer (HCC)    • Dental disorder     dentures UPPERS AND LOWERS   • Elevated alkaline phosphatase level 11/15/2017   • Emphysema of lung (HCC)     COPD   • Fall    • Urdu measles     history of   • Heart murmur     as a child   • Hemorrhagic disorder (HCC)     on Xarelto    • High cholesterol    • Hypercholesteremia    • Hypertension     no meds currently    • Ileostomy in place (HCC)    • Ileostomy in place ([facility])    • Indigestion    • Influenza     history of   • Lightheadedness 9/17/2015   • Mumps     history of   • Obstruction     ileostomy   • Other specified symptom associated with female genital organs     HYSTERECTOMY 1993   • Pneumonia 05/2017    tx'd  with antibx   • Pulmonary embolism (HCC)    • 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)    • Renal insufficiency 3/14/2016   • Restless legs 5/6/2020   • Routine general medical examination at a health care facility 3/14/2016    3/14/16   • Seasonal allergies    • Swelling of both ankles     Lasix PRN   • Tonsillitis      Past Surgical History:   Procedure Laterality Date   • HEPATIC ABLATION  5/28/2020    Procedure: ABLATION, LESION, LIVER-TRISEGMENTECTOMY, MICROWAVE ABLATION, INTRA OPERATIVE ULTRA SOUND, SIMPLE RESECTED / REPAIR OF DIAPHRAGM;  Surgeon: Kit West M.D.;  Location: SURGERY [facility];  Service: General   • HEPATIC ABLATION LAPAROSCOPIC  11/15/2018    Procedure: HEPATIC ABLATION LAPAROSCOPIC.- SEGMENT 7/8;  Surgeon: Kit West M.D.;  Location: SURGERY [facility];  Service: General   • COLONOSCOPY WITH BIOPSY  8/23/2015    Procedure: COLONOSCOPY WITH BIOPSY;  Surgeon: Wilbur Glasgow M.D.;  Location: ENDOSCOPY [facility];  Service:    • HEPATIC ABLATION LAPAROSCOPIC  7/6/2015    Procedure: HEPATIC ABLATION LAPAROSCOPIC.;  Surgeon: Kit West M.D.;  Location: SURGERY [facility];  Service:    • CATH PLACEMENT Left 7/6/2015    Procedure: CATH PLACEMENT CEPHALIC POWERPORT;  Surgeon: Kit West M.D.;  Location: SURGERY [facility];  Service:    • FISTULA IN ANO REPAIR  1/28/2015    Performed by Kolton Montgomery M.D. at SURGERY [facility]   • PERINEAL PROCEDURE  1/28/2015    Performed by Kolton Montgomery M.D. at [facility]   • FISTULA IN ANO REPAIR  10/15/2014    Performed by Kolton Montgomery M.D. at [facility]   • PERINEAL PROCEDURE  10/15/2014    Performed by Kolton Montgomery M.D. at [facility]   • IRRIGATION & DEBRIDEMENT GENERAL  2/19/2014    Performed by Kolton Montgomery M.D. at [facility]   • FLAP GRAFT  2/19/2014    Performed by Kolton Montgomery M.D. at [facility]    • PERINEAL PROCEDURE  12/18/2013    Performed by Kolton Montgomery M.D. at SURGERY [facility]   • MYOCUTANEOUS FLAP  12/18/2013    Performed by Kolton Montgomery M.D. at [facility]   • IRRIGATION & DEBRIDEMENT GENERAL  10/23/2013    Performed by Kolton Montgomery M.D. at [facility]   • FISTULA IN ANO REPAIR  9/4/2013    Performed by Kolton Montgomery M.D. at SURGERY [facility]   • FLAP ROTATION  9/4/2013    Performed by Kolton Montgomery M.D. at [facility]   • RECOVERY  8/26/2013    Performed by [facility]Rec[facility] Surgery at [facility] SAME DAY [facility]   • BIOPSY GENERAL  7/17/2013    Performed by Kolton Montgomery M.D. at [facility]   • PROCTOSCOPY  7/17/2013    Performed by Kolton Montgomery M.D. at [facility]   • FISTULA IN ANO REPAIR  2/27/2013    Performed by Kolton Montgomery M.D. at SURGERY [facility]   • SIGMOIDOSCOPY  2/27/2013    Performed by Kolton Montgomery M.D. at [facility]   • LAPAROSCOPY  10/8/2012    Performed by Kolton Montgomery M.D. at [facility]   • ILEO LOOP DIVERSION  10/8/2012    Performed by Kolton Montgomery M.D. at [facility]   • COLECTOMY  9/26/2012    Performed by Kolton Montgomery M.D. at SURGERY [facility]   • COLONOSCOPY FLEX W/ENDO US  9/20/2012    Performed by Harshil Bolton M.D. at [facility]   • OTHER  2009    left eye torn retina repair   • CATARACT EXTRACTION WITH IOL  2004    bilateral   • ABDOMINAL HYSTERECTOMY TOTAL  1983   • CYST EXCISION  1972    ovarian   • COLON RESECTION     • EYE SURGERY      cataracts   • HYSTERECTOMY LAPAROSCOPY     • PB REMV 2ND CATARACT,CORN-SCLER SECTN     • TONSILLECTOMY       Family History   Problem Relation Age of Onset   • Heart Disease Mother    • Heart Failure Mother    • Hypertension Mother    • Hyperlipidemia Mother    • Cancer Mother    • Cancer Maternal Aunt 60        breast ca   • Lung Disease Brother         COPD/Emphysema/Smoker   • Cancer  Brother         prostate cancer   • Alcohol/Drug Brother         Alcohol   • Kidney Disease Father         renal failure     Social History     Socioeconomic History   • Marital status:      Spouse name: Not on file   • Number of children: Not on file   • Years of education: Not on file   • Highest education level: Not on file   Occupational History   • Not on file   Tobacco Use   • Smoking status: Never Smoker   • Smokeless tobacco: Never Used   Vaping Use   • Vaping Use: Never used   Substance and Sexual Activity   • Alcohol use: No   • Drug use: No   • Sexual activity: Never     Partners: Male     Birth control/protection: Post-Menopausal   Other Topics Concern   • Primary/coprimary nurse & associates Not Asked   • Family contact information Not Asked   • OK to release patient information to the following Not Asked   • Patient preferred routine/Privacy concerns Not Asked   • Patient likes and dislikes Not Asked   • Participating In Research Study Not Asked   • Miscellaneous Not Asked   Social History Narrative   • Not on file     Social Determinants of Health     Financial Resource Strain:    • Difficulty of Paying Living Expenses:    Food Insecurity:    • Worried About Running Out of Food in the Last Year:    • Ran Out of Food in the Last Year:    Transportation Needs:    • Lack of Transportation (Medical):    • Lack of Transportation (Non-Medical):    Physical Activity:    • Days of Exercise per Week:    • Minutes of Exercise per Session:    Stress:    • Feeling of Stress :    Social Connections:    • Frequency of Communication with Friends and Family:    • Frequency of Social Gatherings with Friends and Family:    • Attends [employer] Services:    • Active Member of Clubs or Organizations:    • Attends Club or Organization Meetings:    • Marital Status:    Intimate Partner Violence:    • Fear of Current or Ex-Partner:    • Emotionally Abused:    • Physically Abused:    • Sexually Abused:      Allergies  "  Allergen Reactions   • Oxaliplatin Anaphylaxis   • Codeine      \"gets drunk\"   • Pcn [Penicillins] Itching     itching   • Sulfa Drugs Itching     itching   • Tape Rash     PAPER TAPE OK     Outpatient Encounter Medications as of 6/29/2021   Medication Sig Dispense Refill   • pantoprazole (PROTONIX) 40 MG Tablet Delayed Response Take 1 tablet by mouth every day. 90 tablet 3   • albuterol (PROVENTIL) 2.5mg/3ml Nebu Soln solution for nebulization Take 3 mL by nebulization every four hours as needed for Shortness of Breath. 120 mL 11   • albuterol 108 (90 Base) MCG/ACT Aero Soln inhalation aerosol Inhale 2 Puffs every 6 hours as needed for Shortness of Breath. 1 Each 11   • potassium chloride ER (KLOR-CON) 10 MEQ tablet Take 1 tablet by mouth every day. 100 tablet 3   • rivaroxaban (XARELTO) 10 MG Tab tablet Take 1 Tab by mouth every day. 30 Tab 5   • lidocaine-prilocaine (EMLA) 2.5-2.5 % Cream Apply to port 1 hour prior to access of port and cover with plastic wrap. 30 g 3   • cyanocobalamin (VITAMIN B-12) 500 MCG Tab Take 1,000 mcg by mouth every day.     • Multiple Vitamins-Minerals (CENTRUM SILVER PO) Take 1 Tab by mouth every day.     • Cholecalciferol (VITAMIN D3) 400 UNITS CAPS Take 1 Cap by mouth every day.     • loratadine (CLARITIN) 10 MG TABS Take 10 mg by mouth every day. Indications: Hayfever       No facility-administered encounter medications on file as of 6/29/2021.     Review of Systems   Constitutional: Negative.  Negative for chills, fever and malaise/fatigue.   HENT: Negative.  Negative for sore throat.    Eyes: Negative.    Respiratory: Negative.  Negative for cough, hemoptysis, sputum production, shortness of breath, wheezing and stridor.    Cardiovascular: Negative.  Negative for chest pain, palpitations, orthopnea, claudication, leg swelling and PND.   Gastrointestinal: Negative.    Genitourinary: Negative.    Musculoskeletal: Negative.    Skin: Negative.    Neurological: Negative.  Negative " "for dizziness, loss of consciousness and weakness.   Endo/Heme/Allergies: Negative.  Does not bruise/bleed easily.   All other systems reviewed and are negative.       Objective:   /60 (BP Location: Right arm, Patient Position: Sitting, BP Cuff Size: Adult)   Pulse (!) 102   Ht 1.651 m (5' 5\")   Wt 67.5 kg (148 lb 12.8 oz)   LMP  (LMP Unknown)   SpO2 96%   BMI 24.76 kg/m²     Physical Exam   Constitutional: She appears well-developed. No distress.   HENT:   Head: Normocephalic and atraumatic.   Right Ear: External ear normal.   Left Ear: External ear normal.   Nose: Nose normal.   Mouth/Throat: No oropharyngeal exudate.   Eyes: Pupils are equal, round, and reactive to light. Conjunctivae are normal. Right eye exhibits no discharge. Left eye exhibits no discharge. No scleral icterus.   Neck: No JVD present.   Cardiovascular: Normal rate and regular rhythm. Exam reveals no gallop and no friction rub.   No murmur heard.  Pulmonary/Chest: Effort normal. No stridor. No respiratory distress. She has no wheezes. She has no rales. She exhibits no tenderness.   Abdominal: Soft. She exhibits no distension. There is no guarding.   Musculoskeletal:         General: No tenderness or deformity. Normal range of motion.      Cervical back: Neck supple.   Neurological: She is alert. She has normal reflexes. She displays normal reflexes. No cranial nerve deficit. She exhibits normal muscle tone. Coordination normal.   Skin: Skin is warm and dry. No rash noted. She is not diaphoretic. No erythema. No pallor.   Psychiatric: Her behavior is normal. Judgment and thought content normal.   Nursing note and vitals reviewed.      Assessment:     1. Chemotherapy-induced neutropenia (HCC)     2. Current use of long term anticoagulation  Basic Metabolic Panel   3. Decreased GFR  Basic Metabolic Panel   4. Dehydration  proBrain Natriuretic Peptide, NT    Basic Metabolic Panel   5. Diarrhea, unspecified type  Basic Metabolic Panel    " ABG Draw RC   6. High risk medication use  Basic Metabolic Panel    ABG Draw RC   7. History of pulmonary embolus (PE)  ABG Draw RC   8. Lightheadedness  proBrain Natriuretic Peptide, NT    ABG Draw RC   9. Essential hypertension  proBrain Natriuretic Peptide, NT    ABG Draw RC   10. Hyperlipidemia, unspecified hyperlipidemia type  proBrain Natriuretic Peptide, NT   11. Shortness of breath  proBrain Natriuretic Peptide, NT   12. Secondary malignant neoplasm of liver (HCC)     13. Restless legs     14. Renal insufficiency         Medical Decision Making:  Today's Assessment / Status / Plan:     81-year-old female with PEs anemia edema shortness of breath and high risk medication usage.  From a cardiac standpoint I cannot really figure what is causing her shortness of breath but will we have ruled out essentially most of the causes.  It is is probably a long-term consequence of chemotherapy or other issues.  To complete her work-up I will repeat a basic metabolic panel to review what her bicarbonate level is.  I also like to get an ABG.  I will get a BNP as well and if that is negative then cardiac causes of shortness of breath will essentially been ruled out.

## 2021-06-29 NOTE — PROGRESS NOTES
Pt arrived to [facility] ambulatory, here for pre-chemo labs. Pt reporting Dr. Steele is going to change her chemotherapy, voicing concerns over the switch. Emotional support provided. 23 g butterfly used to draw labs from L hand, gauze dressing applied. Pt knows to return tomorrow for other appts, will potentially have chemo next week. Pt discharged home under self care in no apparent distress.

## 2021-06-30 ENCOUNTER — OFFICE VISIT (OUTPATIENT)
Dept: MEDICAL GROUP | Facility: MEDICAL CENTER | Age: 81
End: 2021-06-30
Payer: MEDICARE

## 2021-06-30 VITALS
SYSTOLIC BLOOD PRESSURE: 110 MMHG | WEIGHT: 145 LBS | HEART RATE: 110 BPM | BODY MASS INDEX: 24.16 KG/M2 | HEIGHT: 65 IN | OXYGEN SATURATION: 95 % | RESPIRATION RATE: 18 BRPM | TEMPERATURE: 98.5 F | DIASTOLIC BLOOD PRESSURE: 70 MMHG

## 2021-06-30 DIAGNOSIS — Z86.711 HISTORY OF PULMONARY EMBOLUS (PE): ICD-10-CM

## 2021-06-30 DIAGNOSIS — R00.0 TACHYCARDIA: ICD-10-CM

## 2021-06-30 DIAGNOSIS — D68.318 HEMORRHAGIC DISORDER DUE TO CIRCULATING ANTICOAGULANTS (HCC): ICD-10-CM

## 2021-06-30 DIAGNOSIS — C18.9 METASTATIC COLON CANCER IN FEMALE: ICD-10-CM

## 2021-06-30 DIAGNOSIS — R06.02 SHORTNESS OF BREATH: ICD-10-CM

## 2021-06-30 DIAGNOSIS — C20 MALIGNANT NEOPLASM OF RECTUM (HCC): Chronic | ICD-10-CM

## 2021-06-30 DIAGNOSIS — C78.7 SECONDARY MALIGNANT NEOPLASM OF LIVER (HCC): Chronic | ICD-10-CM

## 2021-06-30 DIAGNOSIS — N18.30 STAGE 3 CHRONIC KIDNEY DISEASE, UNSPECIFIED WHETHER STAGE 3A OR 3B CKD: ICD-10-CM

## 2021-06-30 DIAGNOSIS — Z93.2 ILEOSTOMY IN PLACE (HCC): ICD-10-CM

## 2021-06-30 PROBLEM — R60.0 LOCALIZED EDEMA: Status: RESOLVED | Noted: 2017-10-04 | Resolved: 2021-06-30

## 2021-06-30 PROBLEM — Z71.84 TRAVEL ADVICE ENCOUNTER: Status: RESOLVED | Noted: 2017-11-15 | Resolved: 2021-06-30

## 2021-06-30 PROBLEM — R52 UNCONTROLLED PAIN: Status: RESOLVED | Noted: 2017-06-15 | Resolved: 2021-06-30

## 2021-06-30 PROBLEM — Z79.01 CURRENT USE OF LONG TERM ANTICOAGULATION: Chronic | Status: RESOLVED | Noted: 2019-06-04 | Resolved: 2021-06-30

## 2021-06-30 PROBLEM — K76.9 LIVER DISEASE: Status: RESOLVED | Noted: 2019-09-10 | Resolved: 2021-06-30

## 2021-06-30 PROCEDURE — 99214 OFFICE O/P EST MOD 30 MIN: CPT | Performed by: FAMILY MEDICINE

## 2021-06-30 ASSESSMENT — ENCOUNTER SYMPTOMS
CHILLS: 0
FEVER: 0
SHORTNESS OF BREATH: 1

## 2021-06-30 ASSESSMENT — PAIN SCALES - GENERAL: PAINLEVEL: NO PAIN

## 2021-06-30 ASSESSMENT — FIBROSIS 4 INDEX: FIB4 SCORE: 1.92

## 2021-06-30 NOTE — PROGRESS NOTES
Subjective:      Karlene Walters is a 81 y.o. female who presents with Chemo Education      HPI   Ms. Walters is established with Dr. 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    Patient was initially diagnosed 2012 secondary to bleeding.  She underwent laparoscopic resection with primary anastomosis per Dr. 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 Patient required diverting loop ostomy with subsequent associated surgeries. Patient with noted liver mass in June 2015 for which she underwent ablative therapy and was initiated on XELOX. She experienced allergic reaction to oxaliplatin and continued with Xeloda. She experienced diarrhea and transitioned to single agent 5-FU in February 2016. She continued with 20% dose reduction since cycle 21 (12/2016) and discontinuation of bolus 5-FU/LV at cycle 39, due to neutropenia and poor tolerance. PET scan completed 5/2017 showing small pulmonary nodules and isolated hepatic lesion. She completed Y90 radio embolization of liver lesion on 6/15/2017, without sequelae. S/p overlapping liver ablations x 3 per Dr. ROJAS on 11/15/18 for localized recurrence per PET completed 10/25/18 (following short break in treatment). CEA improved and was stable since ablation in November 2018. She continued with single agent 5FU for disease control and vascillated from q 14 to q 28 days depending on tolerance. CT completed 5/7/2020 showed new lesion at right liver lobe. PET scan completed 5/14/20 showed increase uptake consisted with metastatic disease. She completed right liver ablation x 10 sites per Dr. ROJAS on 5/28/20 and continued with single agent 5FU, completing cycle 131 in 6/2021. MRI completed 4/5/21 showed stable abdominal findings, biopsy of falciform ligament on 5/28/21 showed no concerns for maligancy. CEA and alk  "phos elevated and variable. Patient with worsening SOB although pulmonary workup was negative: normal O2 sat and negative CXR, CT, ECHO, and VQ. Dr. Steele suspects 5FU toxicity and patient provided chemo holiday. Patient desires continued treatment, given previous intolerance to oxaliplatin she will be initiated on irinotecan/Avastin regimen, every 2 weeks.    She will be starting treatment on 7/6/21 and presents today for review of chemo education given change in regimen. Patient is accompanied by her  for today's visit.        Review of Systems   Constitutional: Negative for chills and fever.   Respiratory: Positive for shortness of breath.           Objective:     /72   Pulse (!) 118   Temp 36.7 °C (98 °F) (Temporal)   Resp 14   Ht 1.62 m (5' 3.78\")   Wt 66.7 kg (147 lb 2.5 oz)   LMP  (LMP Unknown)   SpO2 96%   BMI 25.43 kg/m²      Physical Exam  Pulmonary:      Comments: SOB with speech  Skin:     General: Skin is warm and dry.   Neurological:      General: No focal deficit present.      Mental Status: She is alert.         No visits with results within 1 Day(s) from this visit.   Latest known visit with results is:   [facility] on 06/28/2021   Component Date Value Ref Range Status   • Carcinoembryonic Antigen 06/28/2021 2.5  0.0 - 3.0 ng/mL Final    Comment: Effective 3/18/2020, this assay is performed using Roche ashleigh e immunoassay  analyzer. CEA values determined on patient samples by different testing  procedures cannot be directly compared with one another and could be the  cause of erroneous medical interpretations. If there is a change in the CEA  assay procedure used while monitoring therapy, then new baselines may need  to be established when comparing previous results with results received  after 3/17/2020.     • Sodium 06/28/2021 138  135 - 145 mmol/L Final   • Potassium 06/28/2021 3.8  3.6 - 5.5 mmol/L Final   • Chloride 06/28/2021 106  96 - 112 mmol/L Final "   • Co2 06/28/2021 16* 20 - 33 mmol/L Final   • Anion Gap 06/28/2021 16.0  7.0 - 16.0 Final   • Glucose 06/28/2021 90  65 - 99 mg/dL Final   • Bun 06/28/2021 14  8 - 22 mg/dL Final   • Creatinine 06/28/2021 0.91  0.50 - 1.40 mg/dL Final   • Calcium 06/28/2021 9.6  8.5 - 10.5 mg/dL Final   • AST(SGOT) 06/28/2021 29  12 - 45 U/L Final   • ALT(SGPT) 06/28/2021 20  2 - 50 U/L Final   • Alkaline Phosphatase 06/28/2021 203* 30 - 99 U/L Final   • Total Bilirubin 06/28/2021 0.4  0.1 - 1.5 mg/dL Final   • Albumin 06/28/2021 3.4  3.2 - 4.9 g/dL Final   • Total Protein 06/28/2021 6.8  6.0 - 8.2 g/dL Final   • Globulin 06/28/2021 3.4  1.9 - 3.5 g/dL Final   • A-G Ratio 06/28/2021 1.0  g/dL Final   • WBC 06/28/2021 9.7  4.8 - 10.8 K/uL Final   • RBC 06/28/2021 4.45  4.20 - 5.40 M/uL Final   • Hemoglobin 06/28/2021 11.7* 12.0 - 16.0 g/dL Final   • Hematocrit 06/28/2021 37.2  37.0 - 47.0 % Final   • MCV 06/28/2021 83.6  81.4 - 97.8 fL Final   • MCH 06/28/2021 26.3* 27.0 - 33.0 pg Final   • MCHC 06/28/2021 31.5* 33.6 - 35.0 g/dL Final   • RDW 06/28/2021 64.8* 35.9 - 50.0 fL Final   • Platelet Count 06/28/2021 274  164 - 446 K/uL Final   • MPV 06/28/2021 10.1  9.0 - 12.9 fL Final   • Neutrophils-Polys 06/28/2021 69.70  44.00 - 72.00 % Final   • Lymphocytes 06/28/2021 15.60* 22.00 - 41.00 % Final   • Monocytes 06/28/2021 9.40  0.00 - 13.40 % Final   • Eosinophils 06/28/2021 3.70  0.00 - 6.90 % Final   • Basophils 06/28/2021 0.70  0.00 - 1.80 % Final   • Immature Granulocytes 06/28/2021 0.90  0.00 - 0.90 % Final   • Nucleated RBC 06/28/2021 0.00  /100 WBC Final   • Neutrophils (Absolute) 06/28/2021 6.72  2.00 - 7.15 K/uL Final    Includes immature neutrophils, if present.   • Lymphs (Absolute) 06/28/2021 1.51  1.00 - 4.80 K/uL Final   • Monos (Absolute) 06/28/2021 0.91* 0.00 - 0.85 K/uL Final   • Eos (Absolute) 06/28/2021 0.36  0.00 - 0.51 K/uL Final   • Baso (Absolute) 06/28/2021 0.07  0.00 - 0.12 K/uL Final   • Immature Granulocytes  (abs) 06/28/2021 0.09  0.00 - 0.11 K/uL Final   • NRBC (Absolute) 06/28/2021 0.00  K/uL Final   • GFR If  06/28/2021 >60  >60 mL/min/1.73 m 2 Final   • GFR If Non  06/28/2021 59* >60 mL/min/1.73 m 2 Final       VQ Scan  6/4/2021 1:46 PM  HISTORY/REASON FOR EXAM:  Shortness of breath.     TECHNIQUE/EXAM DESCRIPTION AND NUMBER OF VIEWS:  46 mCi aerosolized Tc 99m-DTPA was administered as a gas, followed by multiple projection imaging. 8.1 mCi Tc 99m-MAA was then administered intravenously followed by multiple projection imaging.     COMPARISON:  Chest x-ray 6/4/2021     FINDINGS:     There is no evidence of a ventilation/perfusion mismatch. There is a mixed defect overlying the left upper lung corresponding to the intravenous Port-A-Cath. No consolidation seen on chest x-ray.     IMPRESSION:  Low probability for pulmonary embolus.      CT Chest  5/18/2021 11:56 AM  HISTORY/REASON FOR EXAM:  Cough; Shortness of breath; Patient with metastatic colon cancer to liver with worsening cough and SOB     TECHNIQUE/EXAM DESCRIPTION:  CT scan of the chest with contrast.     Thin-section helical images were obtained from the lung apices through the adrenal glands following the bolus administration of contrast. 75 mL of Omnipaque 350 nonionic contrast was utilized.     Low dose optimization technique was utilized for this CT exam including automated exposure control and adjustment of the mA and/or kV according to patient size.     COMPARISON:  CT scan of the thorax 3/9/2021     FINDINGS:  There is a wedge-shaped soft tissue opacity in the right lower lobe which measures 11 x 2.1 cm in greatest diameter this area has increased in size since previous exam.     Again redemonstrated is a large ablation cavity near the dome of the liver. This is unchanged in size or appearance since previous exam. There is been previous subtotal resection of the right lobe of the liver.     There is no mediastinal,  hilar, or axillary adenopathy.     The cardiac chambers, great vessels, and aorta are normal in CT appearance.     There are no pleural effusions or other abnormality of the pleura.     The extrathoracic soft tissues and thoracic cage are normal in appearance.     The adrenal glands are normal.  The visualized portions of the  spleen, pancreas, and kidneys are normal.     There is no upper abdominal adenopathy.     There is no abnormal contrast enhancement.     IMPRESSION:  1.  Interval increase in size of wedge-shaped opacity in the right lower lobe. This may represent chronic pneumonitis, parenchymal scarring, or metastatic disease.     2.  Stable ablation cavity in the dome of the liver.     3.  Postoperative changes of the right lobe of the liver.        MRI Abd  4/5/2021 1:35 PM  HISTORY/REASON FOR EXAM:  Liver lesion, > 1cm, US nondiagnostic.  Multiple surgeries.  Follow-up.     TECHNIQUE/EXAM DESCRIPTION: MRI of the liver with dynamic IV gadolinium enhancement.     MR imaging of the liver was performed.  MR images of the liver were obtained with coronal and axial single-shot fast spin-echo T2, fat-suppressed axial FRFSE T2, axial in-phase and out-of-phase FSPGR T1, axial DWI with a b-value of 600, 3D MRCP,    precontrast fat-suppressed FSPGR T1, dynamic gadolinium enhanced axial fat-suppressed T1 FSPGR in the arterial dominant, portal venous, 2-minute and 4-minute delayed phases, with delayed coronal fat-suppressed T1 FSPGR sequence. .     The study was performed on a [employer]an 3.0 [employer] MRI scanner.     15 mL ProHance contrast was administered intravenously.     COMPARISON: 1/11/2021     FINDINGS:  Distortion of the superior RIGHT hepatic lobe again seen consistent with partial resection and prior ablation.  Heterogeneous enhancement surrounds the ablation cavity, similar to prior.  No new focus of persistent effusion.  Focal enhancing lesion again seen in the RIGHT costophrenic angle measuring 17 mm,  stable.  Portal, splenic and superior mesenteric veins are patent.  RIGHT mid abdominal stoma present.  No gross marcos hepatis or retroperitoneal adenopathy.  The spleen is unremarkable.  Adrenal glands are unremarkable.  The kidneys are unremarkable.     IMPRESSION:  1.  Stable appearance of the liver with distortion of the RIGHT lobe consistent with prior partial hepatectomy and ablation.  No evidence to indicate recurrent tumor.  2.  Enhancing soft tissue nodule in the RIGHT cardiophrenic angle likely again seen and unchanged, consistent with adenopathy.         Assessment/Plan:        1. Metastatic colon cancer in female (HCC)     2. Encounter for medication counseling           Patient has metastatic rectal cancer and will be starting Irinotecan/Avastin on 7/6/21.    Patient was educated on chemotherapy including but not limited to: Infusion Policies and procedures, cycling of chemotherapy, length of treatment, alopecia, myelosuppression, infection prevention, fatigue, GI distress, use of specific nausea medications, avoidance of alcoholic beverages and supplement medications, use of sunscreen, chemotherapy precautions at home, and invasive procedures. Patient was provided with drug specific handouts, [facility] chemotherapy and you book, [facility] eating hints book, [facility] side effects sheet.     Additional teaching includes: appetite, diet; increased r/o diarrhea (lomotil provided)      The following appointments, labs, and medications have been provided to the patient:  Line: Port in place  Labs: cbc, cmp, POC urine (am of treatment as per previous routine)  OnPro: n/a  Lab Appointments: at infusion prior to office visit & chemo  Follow up appts: 7/13/21 tox check  Chemotherapy class: completed today, given new regimen  Nausea medication: zofran, compazine sent  Pain medication: n/a  Nurse Neal: already established  Dietician:  Referred per policy -  has concerns about appetite  Speech (SLT): n/a  SW: n/a  Symptom  Mgmt: n/a      Spent 45 minutes of continuous, non-interrupted, face-to-face patient contact in which greater than 50% of the visit was spent counseling and coordinating of care.

## 2021-06-30 NOTE — PROGRESS NOTES
Subjective:     Karlene Walters is a 81 y.o. female presenting with her  for a follow-up.  She reports that she will be undergoing a new course of chemotherapy next week.  Overall, she feels quite stable.  She does continue to have her chronic shortness of breath and cough, but this has been stable.  She has had extensive work-up with cardiology and pulmonology.  She continues on the albuterol as needed, seems minimally helpful.  She also continues on the Stiolto.  She plans to do labs for cardiology soon.  She reports that she did discuss with cardiology her tachycardia and possibly starting a beta-blocker as this may be contributing to her shortness of breath.        Current Outpatient Medications:   •  Tiotropium Bromide-Olodaterol (STIOLTO RESPIMAT INH), Inhale., Disp: , Rfl:   •  ondansetron (ZOFRAN) 4 MG Tab tablet, Take 1 tablet by mouth every four hours as needed for Nausea/Vomiting (for nausea, vomiting)., Disp: 30 tablet, Rfl: 6  •  prochlorperazine (COMPAZINE) 10 MG Tab, Take 1 tablet by mouth every 6 hours as needed (for nausea, vomiting)., Disp: 30 tablet, Rfl: 6  •  loperamide (IMODIUM) 2 MG Cap, Take 1 capsule by mouth see administration instructions., Disp: 30 capsule, Rfl: 11  •  lidocaine-prilocaine (EMLA) 2.5-2.5 % Cream, Apply to port one hour prior to access and cover with plastic wrap., Disp: 1 Each, Rfl: 3  •  pantoprazole (PROTONIX) 40 MG Tablet Delayed Response, Take 1 tablet by mouth every day., Disp: 90 tablet, Rfl: 3  •  albuterol (PROVENTIL) 2.5mg/3ml Nebu Soln solution for nebulization, Take 3 mL by nebulization every four hours as needed for Shortness of Breath., Disp: 120 mL, Rfl: 11  •  albuterol 108 (90 Base) MCG/ACT Aero Soln inhalation aerosol, Inhale 2 Puffs every 6 hours as needed for Shortness of Breath., Disp: 1 Each, Rfl: 11  •  potassium chloride ER (KLOR-CON) 10 MEQ tablet, Take 1 tablet by mouth every day., Disp: 100 tablet, Rfl: 3  •  rivaroxaban (XARELTO) 10  "MG Tab tablet, Take 1 Tab by mouth every day., Disp: 30 Tab, Rfl: 5  •  lidocaine-prilocaine (EMLA) 2.5-2.5 % Cream, Apply to port 1 hour prior to access of port and cover with plastic wrap., Disp: 30 g, Rfl: 3  •  cyanocobalamin (VITAMIN B-12) 500 MCG Tab, Take 1,000 mcg by mouth every day., Disp: , Rfl:   •  Multiple Vitamins-Minerals (CENTRUM SILVER PO), Take 1 Tab by mouth every day., Disp: , Rfl:   •  Cholecalciferol (VITAMIN D3) 400 UNITS CAPS, Take 1 Cap by mouth every day., Disp: , Rfl:   •  loratadine (CLARITIN) 10 MG TABS, Take 10 mg by mouth every day. Indications: Hayfever, Disp: , Rfl:     Objective:     Vitals: /70 (BP Location: Left arm, Patient Position: Sitting, BP Cuff Size: Adult)   Pulse (!) 110   Temp 36.9 °C (98.5 °F) (Temporal)   Resp 18   Ht 1.651 m (5' 5\")   Wt 65.8 kg (145 lb)   LMP  (LMP Unknown)   SpO2 95%   BMI 24.13 kg/m²   General: Alert  HEENT: Normocephalic.   Heart: Tachycardic.  S1 and S2 normal.  No murmurs appreciated.  Respiratory: Normal respiratory effort.  Clear to auscultation bilaterally.  Abdomen: Non-distended, soft  Extremities: No leg edema.    Assessment/Plan:     Karlene was seen today for lab results and evaluation of medication change.    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  Continue albuterol as needed    Tachycardia  Chronic, stable.  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    "

## 2021-07-01 ENCOUNTER — APPOINTMENT (OUTPATIENT)
Dept: ONCOLOGY | Facility: MEDICAL CENTER | Age: 81
End: 2021-07-01
Attending: INTERNAL MEDICINE
Payer: MEDICARE

## 2021-07-06 ENCOUNTER — OUTPATIENT INFUSION SERVICES (OUTPATIENT)
Dept: ONCOLOGY | Facility: MEDICAL CENTER | Age: 81
End: 2021-07-06
Attending: INTERNAL MEDICINE
Payer: MEDICARE

## 2021-07-06 VITALS
DIASTOLIC BLOOD PRESSURE: 65 MMHG | OXYGEN SATURATION: 97 % | RESPIRATION RATE: 18 BRPM | BODY MASS INDEX: 25.18 KG/M2 | SYSTOLIC BLOOD PRESSURE: 144 MMHG | WEIGHT: 147.49 LBS | HEART RATE: 126 BPM | HEIGHT: 64 IN | TEMPERATURE: 98.2 F

## 2021-07-06 DIAGNOSIS — C18.9 METASTATIC COLON CANCER IN FEMALE: ICD-10-CM

## 2021-07-06 DIAGNOSIS — C20 MALIGNANT NEOPLASM OF RECTUM (HCC): ICD-10-CM

## 2021-07-06 LAB
APPEARANCE UR: CLEAR
COLOR UR AUTO: YELLOW
GLUCOSE UR QL STRIP.AUTO: NEGATIVE MG/DL
KETONES UR QL STRIP.AUTO: NEGATIVE MG/DL
LEUKOCYTE ESTERASE UR QL STRIP.AUTO: ABNORMAL
NITRITE UR QL STRIP.AUTO: NEGATIVE
PH UR STRIP.AUTO: 5.5 [PH] (ref 5–8)
PROT UR QL STRIP: NEGATIVE MG/DL
RBC UR QL AUTO: ABNORMAL
SP GR UR STRIP.AUTO: 1.01 (ref 1–1.03)

## 2021-07-06 PROCEDURE — 96375 TX/PRO/DX INJ NEW DRUG ADDON: CPT

## 2021-07-06 PROCEDURE — 96415 CHEMO IV INFUSION ADDL HR: CPT

## 2021-07-06 PROCEDURE — A4212 NON CORING NEEDLE OR STYLET: HCPCS

## 2021-07-06 PROCEDURE — 700111 HCHG RX REV CODE 636 W/ 250 OVERRIDE (IP): Mod: TB | Performed by: INTERNAL MEDICINE

## 2021-07-06 PROCEDURE — 96417 CHEMO IV INFUS EACH ADDL SEQ: CPT

## 2021-07-06 PROCEDURE — 700105 HCHG RX REV CODE 258: Performed by: INTERNAL MEDICINE

## 2021-07-06 PROCEDURE — 96413 CHEMO IV INFUSION 1 HR: CPT

## 2021-07-06 PROCEDURE — 81002 URINALYSIS NONAUTO W/O SCOPE: CPT

## 2021-07-06 RX ORDER — FUROSEMIDE 10 MG/ML
10 INJECTION INTRAMUSCULAR; INTRAVENOUS ONCE
Status: COMPLETED | OUTPATIENT
Start: 2021-07-06 | End: 2021-07-06

## 2021-07-06 RX ORDER — HEPARIN SODIUM (PORCINE) LOCK FLUSH IV SOLN 100 UNIT/ML 100 UNIT/ML
500 SOLUTION INTRAVENOUS PRN
Status: DISCONTINUED | OUTPATIENT
Start: 2021-07-06 | End: 2021-07-06 | Stop reason: HOSPADM

## 2021-07-06 RX ADMIN — SODIUM CHLORIDE 330 MG: 9 INJECTION, SOLUTION INTRAVENOUS at 15:55

## 2021-07-06 RX ADMIN — HEPARIN 500 UNITS: 100 SYRINGE at 17:45

## 2021-07-06 RX ADMIN — DEXAMETHASONE SODIUM PHOSPHATE 12 MG: 4 INJECTION, SOLUTION INTRA-ARTICULAR; INTRALESIONAL; INTRAMUSCULAR; INTRAVENOUS; SOFT TISSUE at 13:07

## 2021-07-06 RX ADMIN — IRINOTECAN HYDROCHLORIDE 261 MG: 20 INJECTION, SOLUTION INTRAVENOUS at 14:07

## 2021-07-06 RX ADMIN — ATROPINE SULFATE 0.5 MG: 1 INJECTION, SOLUTION INTRAMUSCULAR; INTRAVENOUS; SUBCUTANEOUS at 14:01

## 2021-07-06 RX ADMIN — ONDANSETRON 16 MG: 2 INJECTION INTRAMUSCULAR; INTRAVENOUS at 12:51

## 2021-07-06 RX ADMIN — FUROSEMIDE 10 MG: 10 INJECTION, SOLUTION INTRAMUSCULAR; INTRAVENOUS at 12:07

## 2021-07-06 ASSESSMENT — FIBROSIS 4 INDEX: FIB4 SCORE: 1.92

## 2021-07-06 NOTE — PROGRESS NOTES
Pt arrives to [facility] for new treatment of Irinotecan/Bevacizumab.  Discussed plan of care with pt.  Reviewed possible adverse reactions of chemotherapy and pt verbalizes understanding.  Pt denies s/sx of infection or signs of bleeding.   Received telephone order from CARLOS Krishnan that it is ok to use labs on 6/28/2021 for today's treatment.  Pt attempted multiple times to collect urine specimen with no success.  Informed Dr. Steele pt is unable to give urine sample.  Received verbal order to give Lasix 10mg IVP now.  UA collected and urine protein is negative.  BP is within parameters.  Port was accessed with sterile technique, flushed with NS and brisk blood return observed.  Pre-medications given.  Atropine given prior to Irinotecan.  Irinotecan infused without adverse reaction.  Bevacizumab infused over 90 minutes without issue.  Port flushed with NS and heparin locked.  Meade needle removed intact.  Site covered with gauze.  Gave pt a copy of schedule. Pt plans to stay the night in [place] and leave tomorrow back to [place].  Pt left on foot with spouse as transportation.

## 2021-07-06 NOTE — PROGRESS NOTES
Chemotherapy Verification - SECONDARY RN       Height = 162.5 cm  Weight = 66.9 kg  BSA = 1.74 m^2       Medication: irinotecan  Dose: 150 mg/m^2  Calculated Dose: 261 mg                             (In mg/m2, AUC, mg/kg)     Medication: bevacizumab-bvzr  Dose: 5 mg/kg  Calculated Dose: 334.5 mg                             (In mg/m2, AUC, mg/kg)        I confirm that this process was performed independently.

## 2021-07-06 NOTE — PROGRESS NOTES
"Pharmacy Chemotherapy Verification  Patient Name: Karlene Walters      Dx:  Met Colon CA      Protocol: Irinotecan + bevacizumab *no prior hx of bevacizumab tx*       Irinotecan 150 mg/m2 IV over 90 minutes Day 1  bevacizumab 5 mg/kg IV over 60 minutes C1D1, then 30 min following   14 day cycles until disease progression or unacceptable toxicity  *Dosing Reference*     NCCN Guidelines for Colon Cancer v2.2021    Allergies:  Oxaliplatin; Codeine; Pcn [penicillins]; Sulfa drugs; and Tape     /65   Pulse (!) 126 Comment: Simultaneous filing. User may not have seen previous data.  Temp 36.8 °C (98.2 °F) (Temporal) Comment: Simultaneous filing. User may not have seen previous data. Comment (Src): Simultaneous filing. User may not have seen previous data.  Resp 18 Comment: Simultaneous filing. User may not have seen previous data.  Ht 1.625 m (5' 3.98\") Comment: Simultaneous filing. User may not have seen previous data.  Wt 66.9 kg (147 lb 7.8 oz) Comment: Simultaneous filing. User may not have seen previous data.  LMP  (LMP Unknown)   SpO2 97% Comment: Simultaneous filing. User may not have seen previous data.  BMI 25.34 kg/m²  Body surface area is 1.74 meters squared.      Labs: 7/6/21   Meet treatment parameters     Drug Order   (Drug name, dose, route, IV Fluid & volume, frequency, number of doses) Cycle 1    Previous treatment: 6/1/21      Medication = Irinotecan   Base Dose = 150 mg/m²  Calc Dose: Base Dose x 1.74 m² = 261 mg  Final Dose = 261 mg   Route = IV   Final [conc] ~ 0.51 mg/mL  Fluid & Volume = D5W 500 mL  Admin Duration = Over 90 minutes           Okay to treat with final dose     Medication = Bevacizumab-bvzr  Base Dose = 5 mg/kg  Calc Dose: Base Dose x 66.9 kg = 334.5 mg  Final Dose = 330 mg   Route = IV   Fluid & Volume =  mL  Admin Duration = Over 60 minutes           Okay to treat with final dose     "

## 2021-07-06 NOTE — PROGRESS NOTES
"Pharmacy Chemotherapy Calculations Note:    Patient Name: Karlene Walters        Dx: Well-differentiated Rectal CA with liver mets (KRAS mutated)  Cycle:  1 Previous treatment: CIVI 5-FU = 6/1/21     Protocol: Irinotecan + Bevacizumab  Irinotecan 180 mg/m² IV over 30-90 minutes on Day - dose reduced to  150 mg/m² for heavy pretreatment and anticipated tolerance  Bevacizumab 5 mg/kg IV on Day 1  Every 14 days until disease progression or unacceptable toxicity        NCCN Guidelines for Rectal Cancer. V.1.2021.  Bin CS, et al. J Clin Oncol. 2003;21(5):807-14.  Isaias et al. Cancer Invest. 2010;28:33-37       /65   Pulse (!) 126 Comment: Simultaneous filing. User may not have seen previous data.  Temp 36.8 °C (98.2 °F) (Temporal) Comment: Simultaneous filing. User may not have seen previous data. Comment (Src): Simultaneous filing. User may not have seen previous data.  Resp 18 Comment: Simultaneous filing. User may not have seen previous data.  Ht 1.625 m (5' 3.98\") Comment: Simultaneous filing. User may not have seen previous data.  Wt 66.9 kg (147 lb 7.8 oz) Comment: Simultaneous filing. User may not have seen previous data.  LMP  (LMP Unknown)   SpO2 97% Comment: Simultaneous filing. User may not have seen previous data.  BMI 25.34 kg/m²  Body surface area is 1.74 meters squared.   Labs from 6/28/21:  ANC~ 6720  Plt = 274 k    SCr = 0.91 mg/dL CrCl = 51 mL/min  LFT's = WNL  TBili = 0.4  Urine protein = negative  OK to use labs from 6/28 as patient hasn't been treated since 6/1, per APRN [place].     Irinotecan 150 mg/m² x 1.74 m² = 261 mg   <10% difference, ok to treat with final written dose = 261 mg    Bevacizumab-bvzr 5 mg/kg x 66.9 kg = 334.5 mg   <10% difference, ok to treat with final written dose = 330 mg      Rudy Arthur, PharmD, PharmD, BCPS             "

## 2021-07-06 NOTE — PROGRESS NOTES
Chemotherapy Verification - PRIMARY RN    C1 D1    Height = 162.5 cm  Weight = 66.9 kg  BSA = 1.74 m^2       Medication: Irinotecan  Dose: 150 mg/m^2  Calculated Dose: 261 mg                             (In mg/m2, AUC, mg/kg)     Medication: Bevacizumab-bvzr (Zirabev)  Dose: 5 mg/kg  Calculated Dose: 334.5 mg                             (In mg/m2, AUC, mg/kg)    I confirm this process was performed independently with the BSA and all final chemotherapy dosing calculations congruent.  Any discrepancies of 10% or greater have been addressed with the chemotherapy pharmacist. The resolution of the discrepancy has been documented in the EPIC progress notes.

## 2021-07-08 ENCOUNTER — PATIENT OUTREACH (OUTPATIENT)
Dept: OTHER | Facility: MEDICAL CENTER | Age: 81
End: 2021-07-08

## 2021-07-08 NOTE — PROGRESS NOTES
On 7/8/2021 this ONN called patient. This ONN re-introduced self and role of navigator as added resource and support. Patient recalls speaking with this ONN in the past. Patient states that she is doing well. Patient denies issue with transportation. Patient denies questions or concerns or need for resources at this time. Patient took this ONN's phone number and this ONN encouraged patient to call as needed.

## 2021-07-13 ENCOUNTER — OFFICE VISIT (OUTPATIENT)
Dept: HEMATOLOGY ONCOLOGY | Facility: MEDICAL CENTER | Age: 81
End: 2021-07-13
Payer: MEDICARE

## 2021-07-13 VITALS
TEMPERATURE: 98.2 F | HEIGHT: 65 IN | BODY MASS INDEX: 23.99 KG/M2 | OXYGEN SATURATION: 96 % | WEIGHT: 143.96 LBS | RESPIRATION RATE: 19 BRPM | DIASTOLIC BLOOD PRESSURE: 56 MMHG | HEART RATE: 111 BPM | SYSTOLIC BLOOD PRESSURE: 110 MMHG

## 2021-07-13 DIAGNOSIS — K52.1 DIARRHEA DUE TO DRUG: ICD-10-CM

## 2021-07-13 DIAGNOSIS — C78.7 SECONDARY MALIGNANT NEOPLASM OF LIVER (HCC): ICD-10-CM

## 2021-07-13 DIAGNOSIS — R05.9 COUGH: ICD-10-CM

## 2021-07-13 DIAGNOSIS — C20 MALIGNANT NEOPLASM OF RECTUM (HCC): ICD-10-CM

## 2021-07-13 PROCEDURE — 99214 OFFICE O/P EST MOD 30 MIN: CPT | Performed by: NURSE PRACTITIONER

## 2021-07-13 RX ORDER — DIPHENOXYLATE HYDROCHLORIDE AND ATROPINE SULFATE 2.5; .025 MG/1; MG/1
1 TABLET ORAL 4 TIMES DAILY PRN
Qty: 28 TABLET | Refills: 0 | Status: ON HOLD | OUTPATIENT
Start: 2021-07-13 | End: 2021-07-23

## 2021-07-13 ASSESSMENT — ENCOUNTER SYMPTOMS
PALPITATIONS: 1
WEIGHT LOSS: 1
CONSTIPATION: 0
TINGLING: 1
SHORTNESS OF BREATH: 1
ABDOMINAL PAIN: 0
SPUTUM PRODUCTION: 1
WHEEZING: 1
FEVER: 0
DIZZINESS: 0
COUGH: 1
HEADACHES: 0
CHILLS: 0
MYALGIAS: 0
DIARRHEA: 1
VOMITING: 1
NAUSEA: 0
BLOOD IN STOOL: 0

## 2021-07-13 ASSESSMENT — FIBROSIS 4 INDEX: FIB4 SCORE: 1.92

## 2021-07-13 ASSESSMENT — PAIN SCALES - GENERAL: PAINLEVEL: NO PAIN

## 2021-07-13 NOTE — PROGRESS NOTES
Subjective:      Karlene Walters is a 81 y.o. 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  She is status post cycle 1 Irinotecan/Avastin, and is being seen for a 1 week toxicity check (day 8).  She presents accompanied by her  for today's visit.    Cancer History  Patient with a long history of rectal carcinoma initially diagnosed in 2012.  She was found to have metastatic disease to liver and lung in 2015.  She is status post liver ablation as well as has received Y 90 in the past.  She was on XELOX initially but had allergic reaction to oxaliplatin (last dose 7/28/15) and was switched to single agent 5-FU. Patient initially on 5-FU at 2400 mg/m2 with Leucovorin and 5-FU push starting on 10/27/15, followed by 20% dose reduction to 1920 mg/m2 for cycle 18 (7/5/16), followed by deletion of Leucovorin and 5-FU push on 10/31/17. In January 2020 patient was changed to every 3-week cycle at cycle #99 ([place] #91) and then changed to every 4-week interval in May 2020.  It was at that time there was noted to have growth of disease within the liver and was seen by Dr. West, surgeon.  Patient did undergo surgery in hopes of resection of the tumor but it was felt to be too close to the hilum and therefore she did undergo ablation instead on 5/28/20.  She has been back on single agent 5-FU at every 2-week interval, restarted on 6/16/20. Last CT 10/06/20 showed the tumor ablation cavities within the right lobe of the liver have decreased in size since previous examination.  There is a 1.5 x 3.1 cm focal area of enhancement adjacent to the ablation cavity that appears to have increased concerning for possible tumor recurrence.  Based on that finding she was sent for an MRI for further evaluation on 10/8/2020 which confirmed that the appearance of the masses noted in the liver did not have an appearance for suspicious  finding for recurrent metastatic disease.  Patient continued with MRI follow up imaging every 3 months, most recent on 4/5/21 showed stable appearance of the liver with distortion of the right lobe consistent with prior partial hepatectomy and ablation.  No evidence of recurrent tumor.  Enhancing soft tissue nodule in the right cardiophrenic angle again seen and unchanged consistent with adenopathy, measuring 17 mm in size.     March 2021 patient had experienced significant fatigue and shortness of breath.  This had progressively worsened. She was hospitalized in March. She was seen by cardiology and echocardiogram completed which showed an ejection fraction of 65%.  CT chest with contrast completed in March was stable. Continued and progressively worsening SOB, repeat CT chest 5/18/2021. Patient was seen by pulmonary 5/19/2021 and VQ scan negative. MRI brain completed on 5/20/2021 showed no evidence of metastatic disease.    Dr. Steele suspects 5FU toxicity and was transitioned to Irinotecan/Avastin, first dose 7/6/21.    Interval History  Patient presents today for a toxicity check, cycle 1, day 8 of Irinotecan/Avastin. Treatment is Q2 weeks.  Patient stated she believes she tolerated treatment fairly well.  She stated in the past she has difficulty with reinitiating chemotherapy after some time off.  She has noticed fatigue, some days are worse than others.  She is lost approximately 2 pounds in the last week.  She states she has no appetite but she is still eating.  Cough is patient's biggest concern.  She stated she had some mild improvement, but noticed it to be a little worse of the last few days.  She has some sputum production, and experiences shortness of breath more so with the coughing.  She does note intermittent palpitations at times.  She denies any nausea, but states that she vomits only when she coughs extremely hard.  She does note diarrhea through her ostomy.  She states that the output is watery,  "but not pure water, and she is taking approximately 8 tablets of Imodium per day.  She is emptying her ostomy pouch every couple of hours.      Allergies   Allergen Reactions   • Oxaliplatin Anaphylaxis   • Codeine      \"gets drunk\"   • Pcn [Penicillins] Itching     itching   • Sulfa Drugs Itching     itching   • Tape Rash     PAPER TAPE OK     Current Outpatient Medications on File Prior to Visit   Medication Sig Dispense Refill   • Tiotropium Bromide-Olodaterol (STIOLTO RESPIMAT INH) Inhale.     • ondansetron (ZOFRAN) 4 MG Tab tablet Take 1 tablet by mouth every four hours as needed for Nausea/Vomiting (for nausea, vomiting). 30 tablet 6   • prochlorperazine (COMPAZINE) 10 MG Tab Take 1 tablet by mouth every 6 hours as needed (for nausea, vomiting). 30 tablet 6   • lidocaine-prilocaine (EMLA) 2.5-2.5 % Cream Apply to port one hour prior to access and cover with plastic wrap. 1 Each 3   • pantoprazole (PROTONIX) 40 MG Tablet Delayed Response Take 1 tablet by mouth every day. 90 tablet 3   • albuterol (PROVENTIL) 2.5mg/3ml Nebu Soln solution for nebulization Take 3 mL by nebulization every four hours as needed for Shortness of Breath. 120 mL 11   • albuterol 108 (90 Base) MCG/ACT Aero Soln inhalation aerosol Inhale 2 Puffs every 6 hours as needed for Shortness of Breath. 1 Each 11   • potassium chloride ER (KLOR-CON) 10 MEQ tablet Take 1 tablet by mouth every day. 100 tablet 3   • rivaroxaban (XARELTO) 10 MG Tab tablet Take 1 Tab by mouth every day. 30 Tab 5   • lidocaine-prilocaine (EMLA) 2.5-2.5 % Cream Apply to port 1 hour prior to access of port and cover with plastic wrap. 30 g 3   • cyanocobalamin (VITAMIN B-12) 500 MCG Tab Take 1,000 mcg by mouth every day.     • Multiple Vitamins-Minerals (CENTRUM SILVER PO) Take 1 Tab by mouth every day.     • Cholecalciferol (VITAMIN D3) 400 UNITS CAPS Take 1 Cap by mouth every day.     • loratadine (CLARITIN) 10 MG TABS Take 10 mg by mouth every day. Indications: Hayfever " "      No current facility-administered medications on file prior to visit.         Review of Systems   Constitutional: Positive for malaise/fatigue (some days are worse than others) and weight loss (down 2 pounds in the last week - no appetite but she is eating). Negative for chills and fever.   Respiratory: Positive for cough (a little worse in the last few days), sputum production, shortness of breath (still present and not improving) and wheezing.    Cardiovascular: Positive for palpitations (some noted). Negative for chest pain.   Gastrointestinal: Positive for diarrhea (watery (not pure water) and she is using imodium) and vomiting (only when she coughs so hard). Negative for abdominal pain, blood in stool, constipation and nausea.   Genitourinary: Negative for dysuria and hematuria.   Musculoskeletal: Negative for myalgias.   Neurological: Positive for tingling (stable and not worsening). Negative for dizziness and headaches.          Objective:     /56   Pulse (!) 111   Temp 36.8 °C (98.2 °F) (Temporal)   Resp 19   Ht 1.651 m (5' 5\")   Wt 65.3 kg (143 lb 15.4 oz)   LMP  (LMP Unknown)   SpO2 96%   BMI 23.96 kg/m²      Physical Exam  Vitals reviewed.   Constitutional:       General: She is not in acute distress.     Appearance: She is not diaphoretic.   Cardiovascular:      Rate and Rhythm: Regular rhythm. Tachycardia present.      Heart sounds: Normal heart sounds. No murmur heard.   No friction rub. No gallop.    Pulmonary:      Effort: No respiratory distress.      Breath sounds: No wheezing.      Comments: With deep inspiration patient cough uncontrollably. Lung sounds are clear to auscultation but noticeable SOB and cough throughout appt  Abdominal:      General: Bowel sounds are normal. There is no distension.      Palpations: Abdomen is soft.      Tenderness: There is no abdominal tenderness.      Comments: Watery output noted via ostomy   Musculoskeletal:         General: No swelling or " tenderness. Normal range of motion.   Skin:     General: Skin is warm and dry.   Neurological:      Mental Status: She is alert and oriented to person, place, and time.   Psychiatric:         Mood and Affect: Mood normal.         Behavior: Behavior normal.              Assessment/Plan:       1. Malignant neoplasm of rectum (HCC)  diphenoxylate-atropine (LOMOTIL) 2.5-0.025 MG Tab   2. Secondary malignant neoplasm of liver (HCC)  diphenoxylate-atropine (LOMOTIL) 2.5-0.025 MG Tab   3. Diarrhea due to drug  diphenoxylate-atropine (LOMOTIL) 2.5-0.025 MG Tab   4. 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  She has been on longtime use of single agent 5-FU, however due to concern for possible toxicity she was recently changed to irinotecan/Avastin, first dose 7/6/2021, and is here for a 1 week toxicity check.  Patient seemed to tolerate treatment fairly well with expected side effects.  Biggest side effect is diarrhea despite the use of Imodium.  She is using approximately 8 tablets of Imodium per day and still experiencing watery diarrhea from her ileostomy.  Therefore I will have patient discontinue Imodium and will start her on Lomotil 4 times daily as needed.  I did review the [place] prescription monitoring program website and confirmed patient has not received this medication.  Last narcotic, oxycodone, was prescribed in February 2021 given to her by me.  Provided patient with a 7-day supply, 28 tablets and will readdress next week for a follow-up visit prior to day 1 of cycle 2 to see how she tolerated the Lomotil.  Due to frequent output via ostomy, have highly encouraged patient remain well-hydrated including water as well as electrolyte solution.    Patient's biggest complaint is cough which is not improving.  She has been seen and worked up by pulmonary.  Recommend patient try over-the-counter Robitussin to see if she can have a little bit of reprieve and/or  utilize cough/throat lozenges to see if that may help as well.    Patient to follow-up in the clinic in 1 week, or sooner if needed prior to cycle 2, day #1 of treatment.

## 2021-07-15 ENCOUNTER — PATIENT MESSAGE (OUTPATIENT)
Dept: SLEEP MEDICINE | Facility: MEDICAL CENTER | Age: 81
End: 2021-07-15

## 2021-07-15 DIAGNOSIS — J44.9 CHRONIC OBSTRUCTIVE PULMONARY DISEASE, UNSPECIFIED COPD TYPE (HCC): ICD-10-CM

## 2021-07-15 DIAGNOSIS — R05.9 COUGH: ICD-10-CM

## 2021-07-15 NOTE — TELEPHONE ENCOUNTER
From: Karlene Walters  To: Nurse Practitioner Asha Malone  Sent: 7/15/2021 8:06 AM PDT  Subject: Prescription Question    Good Morning,    Could you please call Karlene Walters at [phone]. She is coughing horribly and bringing up all kinds of stuff. She is unable to sleep, coughs when she tries to talk and says Robitusan is not giving her any relief. She is not eating much of anything and I can't honestly say how much she is drinking. Thank you.    Oh, by the way this is Diana Gardner, her daughter. Thx

## 2021-07-16 NOTE — PATIENT COMMUNICATION
JENNIFER Garrison.  You 21 hours ago (12:35 PM)     Can you call and check on patient? Did meds I gave her before (steroid, etc) help?      Spoke to pt and she stated those meds helped a little bit but she still has a cough.

## 2021-07-19 ENCOUNTER — HOSPITAL ENCOUNTER (INPATIENT)
Facility: MEDICAL CENTER | Age: 81
LOS: 4 days | DRG: 177 | End: 2021-07-23
Attending: EMERGENCY MEDICINE | Admitting: INTERNAL MEDICINE
Payer: MEDICARE

## 2021-07-19 ENCOUNTER — APPOINTMENT (OUTPATIENT)
Dept: RADIOLOGY | Facility: MEDICAL CENTER | Age: 81
DRG: 177 | End: 2021-07-19
Attending: EMERGENCY MEDICINE
Payer: MEDICARE

## 2021-07-19 DIAGNOSIS — E86.0 DEHYDRATION: ICD-10-CM

## 2021-07-19 DIAGNOSIS — R05.9 COUGH: ICD-10-CM

## 2021-07-19 DIAGNOSIS — N17.9 AKI (ACUTE KIDNEY INJURY) (HCC): ICD-10-CM

## 2021-07-19 DIAGNOSIS — J69.0 ASPIRATION PNEUMONIA, UNSPECIFIED ASPIRATION PNEUMONIA TYPE, UNSPECIFIED LATERALITY, UNSPECIFIED PART OF LUNG (HCC): ICD-10-CM

## 2021-07-19 PROBLEM — E87.1 HYPONATREMIA: Status: ACTIVE | Noted: 2021-07-19

## 2021-07-19 LAB
ALBUMIN SERPL BCP-MCNC: 3.4 G/DL (ref 3.2–4.9)
ALBUMIN/GLOB SERPL: 0.8 G/DL
ALP SERPL-CCNC: 260 U/L (ref 30–99)
ALT SERPL-CCNC: 22 U/L (ref 2–50)
ANION GAP SERPL CALC-SCNC: 17 MMOL/L (ref 7–16)
ANISOCYTOSIS BLD QL SMEAR: ABNORMAL
AST SERPL-CCNC: 27 U/L (ref 12–45)
BASOPHILS # BLD AUTO: 0 % (ref 0–1.8)
BASOPHILS # BLD: 0 K/UL (ref 0–0.12)
BILIRUB SERPL-MCNC: 0.7 MG/DL (ref 0.1–1.5)
BUN SERPL-MCNC: 39 MG/DL (ref 8–22)
BURR CELLS BLD QL SMEAR: NORMAL
CALCIUM SERPL-MCNC: 9.9 MG/DL (ref 8.5–10.5)
CHLORIDE SERPL-SCNC: 95 MMOL/L (ref 96–112)
CO2 SERPL-SCNC: 17 MMOL/L (ref 20–33)
CREAT SERPL-MCNC: 1.63 MG/DL (ref 0.5–1.4)
EKG IMPRESSION: NORMAL
EKG IMPRESSION: NORMAL
EOSINOPHIL # BLD AUTO: 0 K/UL (ref 0–0.51)
EOSINOPHIL NFR BLD: 0 % (ref 0–6.9)
ERYTHROCYTE [DISTWIDTH] IN BLOOD BY AUTOMATED COUNT: 57.1 FL (ref 35.9–50)
GLOBULIN SER CALC-MCNC: 4.1 G/DL (ref 1.9–3.5)
GLUCOSE SERPL-MCNC: 107 MG/DL (ref 65–99)
HCT VFR BLD AUTO: 36.3 % (ref 37–47)
HGB BLD-MCNC: 11.4 G/DL (ref 12–16)
HYPOCHROMIA BLD QL SMEAR: ABNORMAL
LYMPHOCYTES # BLD AUTO: 0.97 K/UL (ref 1–4.8)
LYMPHOCYTES NFR BLD: 11.3 % (ref 22–41)
MAGNESIUM SERPL-MCNC: 2 MG/DL (ref 1.5–2.5)
MANUAL DIFF BLD: NORMAL
MCH RBC QN AUTO: 25.8 PG (ref 27–33)
MCHC RBC AUTO-ENTMCNC: 31.4 G/DL (ref 33.6–35)
MCV RBC AUTO: 82.1 FL (ref 81.4–97.8)
MICROCYTES BLD QL SMEAR: ABNORMAL
MONOCYTES # BLD AUTO: 0.59 K/UL (ref 0–0.85)
MONOCYTES NFR BLD AUTO: 6.9 % (ref 0–13.4)
MORPHOLOGY BLD-IMP: NORMAL
NEUTROPHILS # BLD AUTO: 6.96 K/UL (ref 2–7.15)
NEUTROPHILS NFR BLD: 80.9 % (ref 44–72)
NRBC # BLD AUTO: 0 K/UL
NRBC BLD-RTO: 0 /100 WBC
OVALOCYTES BLD QL SMEAR: NORMAL
PLATELET # BLD AUTO: 533 K/UL (ref 164–446)
PLATELET BLD QL SMEAR: NORMAL
PMV BLD AUTO: 9.7 FL (ref 9–12.9)
POIKILOCYTOSIS BLD QL SMEAR: NORMAL
POLYCHROMASIA BLD QL SMEAR: NORMAL
POTASSIUM SERPL-SCNC: 5 MMOL/L (ref 3.6–5.5)
PROMYELOCYTES NFR BLD MANUAL: 0.9 %
PROT SERPL-MCNC: 7.5 G/DL (ref 6–8.2)
RBC # BLD AUTO: 4.42 M/UL (ref 4.2–5.4)
RBC BLD AUTO: PRESENT
SODIUM SERPL-SCNC: 129 MMOL/L (ref 135–145)
TOXIC GRANULES BLD QL SMEAR: NORMAL
TROPONIN T SERPL-MCNC: 22 NG/L (ref 6–19)
WBC # BLD AUTO: 8.6 K/UL (ref 4.8–10.8)

## 2021-07-19 PROCEDURE — 770006 HCHG ROOM/CARE - MED/SURG/GYN SEMI*

## 2021-07-19 PROCEDURE — 71045 X-RAY EXAM CHEST 1 VIEW: CPT

## 2021-07-19 PROCEDURE — 80053 COMPREHEN METABOLIC PANEL: CPT

## 2021-07-19 PROCEDURE — 83735 ASSAY OF MAGNESIUM: CPT

## 2021-07-19 PROCEDURE — 85027 COMPLETE CBC AUTOMATED: CPT

## 2021-07-19 PROCEDURE — 84484 ASSAY OF TROPONIN QUANT: CPT

## 2021-07-19 PROCEDURE — 99221 1ST HOSP IP/OBS SF/LOW 40: CPT | Mod: AI | Performed by: INTERNAL MEDICINE

## 2021-07-19 PROCEDURE — 302084 SET,INFUSION NEEDLE 20 X 1": Performed by: EMERGENCY MEDICINE

## 2021-07-19 PROCEDURE — 93005 ELECTROCARDIOGRAM TRACING: CPT | Performed by: EMERGENCY MEDICINE

## 2021-07-19 PROCEDURE — 700105 HCHG RX REV CODE 258: Performed by: EMERGENCY MEDICINE

## 2021-07-19 PROCEDURE — 99285 EMERGENCY DEPT VISIT HI MDM: CPT

## 2021-07-19 PROCEDURE — 85007 BL SMEAR W/DIFF WBC COUNT: CPT

## 2021-07-19 PROCEDURE — 36415 COLL VENOUS BLD VENIPUNCTURE: CPT

## 2021-07-19 RX ORDER — ACETAMINOPHEN 325 MG/1
650 TABLET ORAL EVERY 6 HOURS PRN
Status: DISCONTINUED | OUTPATIENT
Start: 2021-07-19 | End: 2021-07-23 | Stop reason: HOSPADM

## 2021-07-19 RX ORDER — LEVOFLOXACIN 500 MG/1
500 TABLET, FILM COATED ORAL DAILY
Qty: 10 TABLET | Refills: 0 | Status: ON HOLD | OUTPATIENT
Start: 2021-07-19 | End: 2021-07-23

## 2021-07-19 RX ORDER — AMOXICILLIN 250 MG
2 CAPSULE ORAL 2 TIMES DAILY
Status: DISCONTINUED | OUTPATIENT
Start: 2021-07-19 | End: 2021-07-23 | Stop reason: HOSPADM

## 2021-07-19 RX ORDER — SODIUM CHLORIDE 9 MG/ML
1000 INJECTION, SOLUTION INTRAVENOUS CONTINUOUS
Status: ACTIVE | OUTPATIENT
Start: 2021-07-19 | End: 2021-07-20

## 2021-07-19 RX ORDER — SODIUM CHLORIDE 9 MG/ML
2000 INJECTION, SOLUTION INTRAVENOUS CONTINUOUS
Status: DISCONTINUED | OUTPATIENT
Start: 2021-07-19 | End: 2021-07-23 | Stop reason: HOSPADM

## 2021-07-19 RX ORDER — BISACODYL 10 MG
10 SUPPOSITORY, RECTAL RECTAL
Status: DISCONTINUED | OUTPATIENT
Start: 2021-07-19 | End: 2021-07-23 | Stop reason: HOSPADM

## 2021-07-19 RX ORDER — POLYETHYLENE GLYCOL 3350 17 G/17G
1 POWDER, FOR SOLUTION ORAL
Status: DISCONTINUED | OUTPATIENT
Start: 2021-07-19 | End: 2021-07-23 | Stop reason: HOSPADM

## 2021-07-19 RX ORDER — TIOTROPIUM BROMIDE AND OLODATEROL 3.124; 2.736 UG/1; UG/1
SPRAY, METERED RESPIRATORY (INHALATION)
Qty: 1 EACH | Refills: 5 | Status: SHIPPED | OUTPATIENT
Start: 2021-07-19 | End: 2021-10-11

## 2021-07-19 RX ORDER — LABETALOL HYDROCHLORIDE 5 MG/ML
10 INJECTION, SOLUTION INTRAVENOUS EVERY 4 HOURS PRN
Status: DISCONTINUED | OUTPATIENT
Start: 2021-07-19 | End: 2021-07-23 | Stop reason: HOSPADM

## 2021-07-19 RX ORDER — PREDNISONE 10 MG/1
TABLET ORAL
Qty: 40 TABLET | Refills: 0 | Status: SHIPPED | OUTPATIENT
Start: 2021-07-19 | End: 2021-08-26

## 2021-07-19 RX ORDER — SODIUM CHLORIDE, SODIUM LACTATE, POTASSIUM CHLORIDE, CALCIUM CHLORIDE 600; 310; 30; 20 MG/100ML; MG/100ML; MG/100ML; MG/100ML
1000 INJECTION, SOLUTION INTRAVENOUS ONCE
Status: COMPLETED | OUTPATIENT
Start: 2021-07-19 | End: 2021-07-19

## 2021-07-19 RX ADMIN — SODIUM CHLORIDE, POTASSIUM CHLORIDE, SODIUM LACTATE AND CALCIUM CHLORIDE 1000 ML: 600; 310; 30; 20 INJECTION, SOLUTION INTRAVENOUS at 21:58

## 2021-07-19 RX ADMIN — SODIUM CHLORIDE 1000 ML: 9 INJECTION, SOLUTION INTRAVENOUS at 23:30

## 2021-07-19 ASSESSMENT — FIBROSIS 4 INDEX: FIB4 SCORE: 1.92

## 2021-07-19 NOTE — PATIENT COMMUNICATION
Lab slips and specimen cups X3 left at the  for . Patient coming in from [place] this afternoon to stay the night so she can do her Radiation therapy tomorrow. They will swing by our office to  the specimen cups and lab slips.

## 2021-07-19 NOTE — PATIENT COMMUNICATION
BROWN Garrison 2 hours ago (6:07 AM)        Let's order sputum culture to be done ASAP. Order signed. Then she can start prednisone taper and levaquin after performing those. RX for tussionex 5ml q12hrs to help suppress cough; there is hydrocodone in the cough syrup so to take only as recommended and not in conjunction with any other narcotics so patient can sleep.        Pt notified and stated she can not take hydrocodone, but will complete the labs.

## 2021-07-19 NOTE — PROGRESS NOTES
She should let us know what the intolerance is so we can list it in her allergy list. Otherwise this would help her sleep.

## 2021-07-19 NOTE — PROGRESS NOTES
Let's order sputum culture to be done ASAP. Order signed. Then she can start prednisone taper and levaquin after performing those. RX for tussionex 5ml q12hrs to help suppress cough; there is hydrocodone in the cough syrup so to take only as recommended and not in conjunction with any other narcotics so patient can sleep.

## 2021-07-19 NOTE — TELEPHONE ENCOUNTER
Have we ever prescribed this med? Yes.  If yes, what date? 5/19/2021    Last OV: 6/21/2021 ALLEN Ventura MD     Next OV: 9/30/2021 ALLEN Ventura MD     DX: Chronic obstructive pulmonary disease, unspecified COPD type (HCC) (J44.9)    Medications: STIOLTO RESPIMAT 2.5-2.5 MCG/ACT Aero Soln

## 2021-07-20 ENCOUNTER — APPOINTMENT (OUTPATIENT)
Dept: ONCOLOGY | Facility: MEDICAL CENTER | Age: 81
End: 2021-07-20
Attending: INTERNAL MEDICINE
Payer: MEDICARE

## 2021-07-20 ENCOUNTER — APPOINTMENT (OUTPATIENT)
Dept: HEMATOLOGY ONCOLOGY | Facility: MEDICAL CENTER | Age: 81
End: 2021-07-20
Payer: MEDICARE

## 2021-07-20 ENCOUNTER — APPOINTMENT (OUTPATIENT)
Dept: RADIOLOGY | Facility: MEDICAL CENTER | Age: 81
DRG: 177 | End: 2021-07-20
Attending: STUDENT IN AN ORGANIZED HEALTH CARE EDUCATION/TRAINING PROGRAM
Payer: MEDICARE

## 2021-07-20 LAB
ALBUMIN SERPL BCP-MCNC: 2.9 G/DL (ref 3.2–4.9)
ALBUMIN/GLOB SERPL: 1 G/DL
ALP SERPL-CCNC: 187 U/L (ref 30–99)
ALT SERPL-CCNC: 15 U/L (ref 2–50)
ANION GAP SERPL CALC-SCNC: 13 MMOL/L (ref 7–16)
ANISOCYTOSIS BLD QL SMEAR: ABNORMAL
AST SERPL-CCNC: 27 U/L (ref 12–45)
BASOPHILS # BLD AUTO: 0 % (ref 0–1.8)
BASOPHILS # BLD: 0 K/UL (ref 0–0.12)
BILIRUB SERPL-MCNC: 0.5 MG/DL (ref 0.1–1.5)
BUN SERPL-MCNC: 31 MG/DL (ref 8–22)
CALCIUM SERPL-MCNC: 8.5 MG/DL (ref 8.5–10.5)
CHLORIDE SERPL-SCNC: 102 MMOL/L (ref 96–112)
CO2 SERPL-SCNC: 17 MMOL/L (ref 20–33)
CREAT SERPL-MCNC: 1.18 MG/DL (ref 0.5–1.4)
EOSINOPHIL # BLD AUTO: 0.23 K/UL (ref 0–0.51)
EOSINOPHIL NFR BLD: 3.5 % (ref 0–6.9)
ERYTHROCYTE [DISTWIDTH] IN BLOOD BY AUTOMATED COUNT: 57.8 FL (ref 35.9–50)
GLOBULIN SER CALC-MCNC: 3 G/DL (ref 1.9–3.5)
GLUCOSE SERPL-MCNC: 95 MG/DL (ref 65–99)
GRAM STN SPEC: NORMAL
GRAM STN SPEC: NORMAL
HCT VFR BLD AUTO: 30.5 % (ref 37–47)
HGB BLD-MCNC: 9.5 G/DL (ref 12–16)
LYMPHOCYTES # BLD AUTO: 0.69 K/UL (ref 1–4.8)
LYMPHOCYTES NFR BLD: 10.5 % (ref 22–41)
MANUAL DIFF BLD: NORMAL
MCH RBC QN AUTO: 26 PG (ref 27–33)
MCHC RBC AUTO-ENTMCNC: 31.1 G/DL (ref 33.6–35)
MCV RBC AUTO: 83.3 FL (ref 81.4–97.8)
MICROCYTES BLD QL SMEAR: ABNORMAL
MONOCYTES # BLD AUTO: 0.63 K/UL (ref 0–0.85)
MONOCYTES NFR BLD AUTO: 9.6 % (ref 0–13.4)
MORPHOLOGY BLD-IMP: NORMAL
MYELOCYTES NFR BLD MANUAL: 3.5 %
NEUTROPHILS # BLD AUTO: 4.75 K/UL (ref 2–7.15)
NEUTROPHILS NFR BLD: 71.1 % (ref 44–72)
NEUTS BAND NFR BLD MANUAL: 0.9 % (ref 0–10)
NRBC # BLD AUTO: 0 K/UL
NRBC BLD-RTO: 0 /100 WBC
PLATELET # BLD AUTO: 394 K/UL (ref 164–446)
PLATELET BLD QL SMEAR: NORMAL
PMV BLD AUTO: 9.3 FL (ref 9–12.9)
POTASSIUM SERPL-SCNC: 4.5 MMOL/L (ref 3.6–5.5)
PROCALCITONIN SERPL-MCNC: 0.41 NG/ML
PROMYELOCYTES NFR BLD MANUAL: 0.9 %
PROT SERPL-MCNC: 5.9 G/DL (ref 6–8.2)
RBC # BLD AUTO: 3.66 M/UL (ref 4.2–5.4)
RBC BLD AUTO: PRESENT
SIGNIFICANT IND 70042: NORMAL
SIGNIFICANT IND 70042: NORMAL
SITE SITE: NORMAL
SITE SITE: NORMAL
SODIUM SERPL-SCNC: 132 MMOL/L (ref 135–145)
SOURCE SOURCE: NORMAL
SOURCE SOURCE: NORMAL
WBC # BLD AUTO: 6.6 K/UL (ref 4.8–10.8)

## 2021-07-20 PROCEDURE — 99233 SBSQ HOSP IP/OBS HIGH 50: CPT | Performed by: INTERNAL MEDICINE

## 2021-07-20 PROCEDURE — 99233 SBSQ HOSP IP/OBS HIGH 50: CPT | Performed by: NURSE PRACTITIONER

## 2021-07-20 PROCEDURE — 87015 SPECIMEN INFECT AGNT CONCNTJ: CPT

## 2021-07-20 PROCEDURE — 700102 HCHG RX REV CODE 250 W/ 637 OVERRIDE(OP): Performed by: STUDENT IN AN ORGANIZED HEALTH CARE EDUCATION/TRAINING PROGRAM

## 2021-07-20 PROCEDURE — 87116 MYCOBACTERIA CULTURE: CPT

## 2021-07-20 PROCEDURE — 700102 HCHG RX REV CODE 250 W/ 637 OVERRIDE(OP): Performed by: INTERNAL MEDICINE

## 2021-07-20 PROCEDURE — 87206 SMEAR FLUORESCENT/ACID STAI: CPT

## 2021-07-20 PROCEDURE — 99221 1ST HOSP IP/OBS SF/LOW 40: CPT | Mod: GC | Performed by: INTERNAL MEDICINE

## 2021-07-20 PROCEDURE — 71250 CT THORAX DX C-: CPT | Mod: ME

## 2021-07-20 PROCEDURE — 80053 COMPREHEN METABOLIC PANEL: CPT

## 2021-07-20 PROCEDURE — 84145 PROCALCITONIN (PCT): CPT

## 2021-07-20 PROCEDURE — 770006 HCHG ROOM/CARE - MED/SURG/GYN SEMI*

## 2021-07-20 PROCEDURE — 700111 HCHG RX REV CODE 636 W/ 250 OVERRIDE (IP): Performed by: INTERNAL MEDICINE

## 2021-07-20 PROCEDURE — 85027 COMPLETE CBC AUTOMATED: CPT

## 2021-07-20 PROCEDURE — 85007 BL SMEAR W/DIFF WBC COUNT: CPT

## 2021-07-20 PROCEDURE — A9270 NON-COVERED ITEM OR SERVICE: HCPCS | Performed by: INTERNAL MEDICINE

## 2021-07-20 PROCEDURE — 700105 HCHG RX REV CODE 258: Performed by: INTERNAL MEDICINE

## 2021-07-20 PROCEDURE — 87205 SMEAR GRAM STAIN: CPT | Mod: 91

## 2021-07-20 PROCEDURE — A9270 NON-COVERED ITEM OR SERVICE: HCPCS | Performed by: STUDENT IN AN ORGANIZED HEALTH CARE EDUCATION/TRAINING PROGRAM

## 2021-07-20 RX ORDER — ONDANSETRON 2 MG/ML
4 INJECTION INTRAMUSCULAR; INTRAVENOUS EVERY 4 HOURS PRN
Status: DISCONTINUED | OUTPATIENT
Start: 2021-07-20 | End: 2021-07-23 | Stop reason: HOSPADM

## 2021-07-20 RX ORDER — LEVOFLOXACIN 5 MG/ML
750 INJECTION, SOLUTION INTRAVENOUS EVERY 24 HOURS
Status: DISCONTINUED | OUTPATIENT
Start: 2021-07-20 | End: 2021-07-21

## 2021-07-20 RX ORDER — GUAIFENESIN 600 MG/1
600 TABLET, EXTENDED RELEASE ORAL EVERY 12 HOURS
Status: DISCONTINUED | OUTPATIENT
Start: 2021-07-20 | End: 2021-07-23 | Stop reason: HOSPADM

## 2021-07-20 RX ADMIN — RIVAROXABAN 10 MG: 10 TABLET, FILM COATED ORAL at 02:59

## 2021-07-20 RX ADMIN — SODIUM CHLORIDE 2000 ML: 9 INJECTION, SOLUTION INTRAVENOUS at 01:05

## 2021-07-20 RX ADMIN — GUAIFENESIN 600 MG: 600 TABLET, EXTENDED RELEASE ORAL at 18:43

## 2021-07-20 RX ADMIN — RIVAROXABAN 10 MG: 10 TABLET, FILM COATED ORAL at 22:47

## 2021-07-20 RX ADMIN — ONDANSETRON 4 MG: 2 INJECTION INTRAMUSCULAR; INTRAVENOUS at 18:39

## 2021-07-20 RX ADMIN — ONDANSETRON 4 MG: 2 INJECTION INTRAMUSCULAR; INTRAVENOUS at 23:30

## 2021-07-20 RX ADMIN — ACETAMINOPHEN 650 MG: 325 TABLET, FILM COATED ORAL at 22:48

## 2021-07-20 ASSESSMENT — ENCOUNTER SYMPTOMS
MEMORY LOSS: 0
NAUSEA: 0
SHORTNESS OF BREATH: 1
WEIGHT LOSS: 1
FEVER: 0
WEAKNESS: 1
SINUS PAIN: 0
HEADACHES: 0
HEARTBURN: 1
CONSTIPATION: 0
CHILLS: 0
VOMITING: 0
FALLS: 0
ROS GI COMMENTS: NO APPETITE
INSOMNIA: 1
SENSORY CHANGE: 0
DIAPHORESIS: 0
PND: 0
SPUTUM PRODUCTION: 1
ABDOMINAL PAIN: 0
WHEEZING: 0
SPEECH CHANGE: 0
COUGH: 1
SORE THROAT: 1
HEMOPTYSIS: 0
FOCAL WEAKNESS: 0
PALPITATIONS: 0
DIARRHEA: 0

## 2021-07-20 ASSESSMENT — PAIN DESCRIPTION - PAIN TYPE
TYPE: ACUTE PAIN
TYPE: ACUTE PAIN

## 2021-07-20 ASSESSMENT — COGNITIVE AND FUNCTIONAL STATUS - GENERAL
HELP NEEDED FOR BATHING: A LITTLE
STANDING UP FROM CHAIR USING ARMS: A LITTLE
CLIMB 3 TO 5 STEPS WITH RAILING: A LITTLE
MOBILITY SCORE: 21
DAILY ACTIVITIY SCORE: 22
SUGGESTED CMS G CODE MODIFIER DAILY ACTIVITY: CJ
WALKING IN HOSPITAL ROOM: A LITTLE
DRESSING REGULAR LOWER BODY CLOTHING: A LITTLE
SUGGESTED CMS G CODE MODIFIER MOBILITY: CJ

## 2021-07-20 ASSESSMENT — LIFESTYLE VARIABLES
EVER HAD A DRINK FIRST THING IN THE MORNING TO STEADY YOUR NERVES TO GET RID OF A HANGOVER: NO
ALCOHOL_USE: NO
TOTAL SCORE: 0
CONSUMPTION TOTAL: NEGATIVE
AVERAGE NUMBER OF DAYS PER WEEK YOU HAVE A DRINK CONTAINING ALCOHOL: 0
DOES PATIENT WANT TO STOP DRINKING: NO
EVER FELT BAD OR GUILTY ABOUT YOUR DRINKING: NO
HAVE YOU EVER FELT YOU SHOULD CUT DOWN ON YOUR DRINKING: NO
HAVE PEOPLE ANNOYED YOU BY CRITICIZING YOUR DRINKING: NO
ON A TYPICAL DAY WHEN YOU DRINK ALCOHOL HOW MANY DRINKS DO YOU HAVE: 0
HOW MANY TIMES IN THE PAST YEAR HAVE YOU HAD 5 OR MORE DRINKS IN A DAY: 0
TOTAL SCORE: 0
TOTAL SCORE: 0

## 2021-07-20 ASSESSMENT — COPD QUESTIONNAIRES
HAVE YOU SMOKED AT LEAST 100 CIGARETTES IN YOUR ENTIRE LIFE: NO/DON'T KNOW
DO YOU EVER COUGH UP ANY MUCUS OR PHLEGM?: YES, A FEW DAYS A WEEK OR MONTH
COPD SCREENING SCORE: 4
DURING THE PAST 4 WEEKS HOW MUCH DID YOU FEEL SHORT OF BREATH: NONE/LITTLE OF THE TIME

## 2021-07-20 NOTE — PROGRESS NOTES
Ambulated patient to restroom. Patient changed own ileostomy bag and told this RN that she had bloody smears in her stool output. I checked the bag and I did see some residual blood in the bag. I voalted hospitalist and no new orders at this time. Will continue to monitor.

## 2021-07-20 NOTE — ED NOTES
Patient resting comfortably, no signs of distress, VSS, NS started bag 1/3, patient denies needs at this time,  remains at bedside

## 2021-07-20 NOTE — ED NOTES
Patient with sustained V Tach on monitor, ERP called and EKG completed, EKG showed , patient alert and oriented, able to answer all questions and denies any chest pain throughout episode. ERP at bedside for eval, attempted vagal maneuvers, to slow ST, unsuccessful. Leads replaced and monitor set to relearn, patient continues to be in -110, patient has a history of tachycardia and is asymptomatic at this time, [facility]

## 2021-07-20 NOTE — CARE PLAN
Problem: Nutritional:  Goal: Achieve adequate nutritional intake  Description: Diet will advance beyond NPO / clear liquids and patient will consume >50% of meals  Outcome: Not Met   See dietary note. RD following.

## 2021-07-20 NOTE — ASSESSMENT & PLAN NOTE
Patient has been on a low dose Xarelto 10 mg PO daily for many years d/t increased risk for clots with her metastatic disease.

## 2021-07-20 NOTE — ED TRIAGE NOTES
Karlene Walters  81 y.o.  female  Chief Complaint   Patient presents with   • Weakness     c/o loss of appetite for the past 6 days. SOB with exertion. Cancer patient ( colorectal ) with lilliana to liver and on chemo every other week.    • Cough     productive cough x weeks. greenish phlegm noted. afebrile.

## 2021-07-20 NOTE — ASSESSMENT & PLAN NOTE
Likely r/t to dehydration as patient is not eating or drinking anything at home. Improvement noted with the use of IV hydration. Will defer to hospital team.

## 2021-07-20 NOTE — H&P
[facility] Medicine History & Physical Note    Date of Service  7/20/2021    Primary Care Physician  Manan Quiñonez M.D.    Consultants      Specialist Names:     Code Status  Full Code    Chief Complaint  Chief Complaint   Patient presents with   • Weakness     c/o loss of appetite for the past 6 days. SOB with exertion. Cancer patient ( colorectal ) with lilliana to liver and on chemo every other week.    • Cough     productive cough x weeks. greenish phlegm noted. afebrile.        History of Presenting Illness  Karlene Walters is a 81 y.o. female who presented 7/19/2021 to the Emergency Department for evaluation of generalized weakness. She has colorectal cancer with metastasis to the liver and is on chemo every other week. She states that she has had a cough since June 1st, which has been worsening. The cough is productive, with green, yellow, and black sputum. Her throat is sore secondary to the cough. She is supposed to have sputum testing done tomorrow. Additionally she has shortness of breath and loss of appetite. She has not had any fevers, abdominal pain, or leg swelling. She has been battling cancer for the last ten years, and Dr. Steele is her oncologist. She has had multiple hepatic ablation, and her last PET scan was several months ago. She is on Xarelto.     I discussed the plan of care with patient.    Review of Systems  ROS    Past Medical History   has a past medical history of Adverse effect of anesthesia, Anesthesia, Arrhythmia, Blood clotting disorder (HCC) (08/2015), Blood transfusion (1957), Breath shortness, Cancer (HCC) (09/2012), CATARACT, Chemotherapy-induced neutropenia (HCC) (9/28/2016), Chickenpox, Colon cancer (HCC), Dental disorder, Elevated alkaline phosphatase level (11/15/2017), Emphysema of lung (HCC), Fall, [place] measles, Heart murmur, Hemorrhagic disorder (HCC), High cholesterol, Hypercholesteremia, Hypertension, Ileostomy in place (HCC), Ileostomy in place (HCC), Indigestion,  Influenza, Lightheadedness (9/17/2015), Mumps, Obstruction, Other specified symptom associated with female genital organs, Pneumonia (05/2017), Pulmonary embolism (HCC), 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    Surgical History   has a past surgical history that includes tonsillectomy; abdominal hysterectomy total (1983); eye surgery; cyst excision (1972); colonoscopy flex w/endo us (9/20/2012); laparoscopy (10/8/2012); ileo loop diversion (10/8/2012); fistula in ano repair (2/27/2013); biopsy general (7/17/2013); recovery (8/26/2013); fistula in ano repair (9/4/2013); flap rotation (9/4/2013); irrigation & debridement general (10/23/2013); perineal procedure (12/18/2013); myocutaneous flap (12/18/2013); cataract extraction with iol (2004); irrigation & debridement general (2/19/2014); flap graft (2/19/2014); fistula in ano repair (10/15/2014); perineal procedure (10/15/2014); fistula in ano repair (1/28/2015); perineal procedure (1/28/2015); hepatic ablation laparoscopic (7/6/2015); cath placement (Left, 7/6/2015); colonoscopy with biopsy (8/23/2015); pr remv 2nd cataract,corn-scler sectn; hysterectomy laparoscopy; colectomy (9/26/2012); sigmoidoscopy (2/27/2013); proctoscopy (7/17/2013); colon resection; other (2009); hepatic ablation laparoscopic (11/15/2018); and hepatic ablation (5/28/2020).     Family History  family history includes Alcohol/Drug in her brother; Cancer in her brother and mother; Cancer (age of onset: 60) in her maternal aunt; Heart Disease in her mother; Heart Failure in her mother; Hyperlipidemia in her mother; Hypertension in her mother; Kidney Disease in her father; Lung Disease in her brother.   Family history reviewed with patient. There is no family history that is pertinent to the  "chief complaint.     Social History   reports that she has never smoked. She has never used smokeless tobacco. She reports that she does not drink alcohol and does not use drugs.    Allergies  Allergies   Allergen Reactions   • Oxaliplatin Anaphylaxis   • Codeine      \"gets drunk\"   • Pcn [Penicillins] Itching     itching   • Sulfa Drugs Itching     itching   • Tape Rash     PAPER TAPE OK       Medications  Prior to Admission Medications   Prescriptions Last Dose Informant Patient Reported? Taking?   Cholecalciferol (VITAMIN D3) 400 UNITS CAPS  Patient Yes No   Sig: Take 1 Cap by mouth every day.   Hydrocod Polst-CPM Polst ER (TUSSIONEX PENNKINETIC ER) 10-8 MG/5ML Suspension Extended Release   No No   Sig: Take 5 mL by mouth every 12 hours as needed for Cough or Rhinitis for up to 28 days.   Multiple Vitamins-Minerals (CENTRUM SILVER PO)  Patient Yes No   Sig: Take 1 Tab by mouth every day.   STIOLTO RESPIMAT 2.5-2.5 MCG/ACT Aero Soln   No No   Sig: INHALE 2 PUFFS BY MOUTH ONCE DAILY   Tiotropium Bromide-Olodaterol (STIOLTO RESPIMAT INH)   Yes No   Sig: Inhale.   albuterol (PROVENTIL) 2.5mg/3ml Nebu Soln solution for nebulization   No No   Sig: Take 3 mL by nebulization every four hours as needed for Shortness of Breath.   albuterol 108 (90 Base) MCG/ACT Aero Soln inhalation aerosol   No No   Sig: Inhale 2 Puffs every 6 hours as needed for Shortness of Breath.   cyanocobalamin (VITAMIN B-12) 500 MCG Tab  Patient Yes No   Sig: Take 1,000 mcg by mouth every day.   diphenoxylate-atropine (LOMOTIL) 2.5-0.025 MG Tab   No No   Sig: Take 1 tablet by mouth 4 times a day as needed for Diarrhea for up to 7 days.   levoFLOXacin (LEVAQUIN) 500 MG tablet   No No   Sig: Take 1 tablet by mouth every day.   lidocaine-prilocaine (EMLA) 2.5-2.5 % Cream   No No   Sig: Apply to port 1 hour prior to access of port and cover with plastic wrap.   lidocaine-prilocaine (EMLA) 2.5-2.5 % Cream   No No   Sig: Apply to port one hour prior to " access and cover with plastic wrap.   loratadine (CLARITIN) 10 MG TABS  Patient Yes No   Sig: Take 10 mg by mouth every day. Indications: Hayfever   ondansetron (ZOFRAN) 4 MG Tab tablet   No No   Sig: Take 1 tablet by mouth every four hours as needed for Nausea/Vomiting (for nausea, vomiting).   pantoprazole (PROTONIX) 40 MG Tablet Delayed Response   No No   Sig: Take 1 tablet by mouth every day.   potassium chloride ER (KLOR-CON) 10 MEQ tablet   No No   Sig: Take 1 tablet by mouth every day.   predniSONE (DELTASONE) 10 MG Tab   No No   Sig: Take 40mg x 4 days, then take 30mg x 4 days, then take 20mg x 4 days, then 10mg x 4 days with food, then discontinue.   prochlorperazine (COMPAZINE) 10 MG Tab   No No   Sig: Take 1 tablet by mouth every 6 hours as needed (for nausea, vomiting).   rivaroxaban (XARELTO) 10 MG Tab tablet   No No   Sig: Take 1 Tab by mouth every day.      Facility-Administered Medications: None       Physical Exam  Temp:  [36.8 °C (98.2 °F)] 36.8 °C (98.2 °F)  Pulse:  [] 93  Resp:  [16-21] 18  BP: (103-135)/(59-80) 125/74  SpO2:  [88 %-96 %] 93 %    Physical Exam    Laboratory:  Recent Labs     07/19/21 2200   WBC 8.6   RBC 4.42   HEMOGLOBIN 11.4*   HEMATOCRIT 36.3*   MCV 82.1   MCH 25.8*   MCHC 31.4*   RDW 57.1*   PLATELETCT 533*   MPV 9.7     Recent Labs     07/19/21  2200   SODIUM 129*   POTASSIUM 5.0   CHLORIDE 95*   CO2 17*   GLUCOSE 107*   BUN 39*   CREATININE 1.63*   CALCIUM 9.9     Recent Labs     07/19/21  2200   ALTSGPT 22   ASTSGOT 27   ALKPHOSPHAT 260*   TBILIRUBIN 0.7   GLUCOSE 107*         No results for input(s): NTPROBNP in the last 72 hours.      Recent Labs     07/19/21  2200   TROPONINT 22*       Imaging:  DX-CHEST-PORTABLE (1 VIEW)   Final Result         1.  Hazy right lung base opacities, concerning for infiltrates          X-Ray:  I have personally reviewed the images and compared with prior images.    Assessment/Plan:  I anticipate this patient will require at least two  midnights for appropriate medical management, necessitating inpatient admission.    Hyponatremia  Assessment & Plan  Hypovolemic, likely hypotonic, trial normal saline.  Follow-up serial chemistry    Acute renal failure (ARF) (HCC)  Assessment & Plan  Largely prerenal, IV fluid challenge, avoid nephrotoxic meds and doses, follow-up chemistry    Secondary malignant neoplasm of liver (HCC)- (present on admission)  Assessment & Plan  Obtain oncology consult in a.m.    Anemia- (present on admission)  Assessment & Plan  Likely secondary to malignancy, follow-up anemia labs    Malignant neoplasm of rectum (HCC)- (present on admission)  Assessment & Plan  Obtain oncology consult in a.m.      VTE prophylaxis: SCDs/TEDs

## 2021-07-20 NOTE — ASSESSMENT & PLAN NOTE
Patient on Irinotecan/Avastin every 2 weeks - cycle 2 scheduled, 7/20/21. Hold chemo at this time until clinically improved. Plan to follow up in the clinic in 2 weeks to possibly restart treatment if clinically improved.    Continued monitoring of the liver with MRIs - next scheduled 10/18/21 per Dr. West.  Last MRI was stable with no evidence of recurrence.

## 2021-07-20 NOTE — CONSULTS
Pulmonary Consultation Note    Patient ID:   Name:             Karlene Walters   YOB: 1940  Age:                 81 y.o.  female   MRN:               [id number]  Referring Provider: Dr. Leslye Fletcher                                                  History of Present Illness:     Patient is an 80 y/o F with PMH significant for colorectal cancer w/ mets to liver (dx 2012, s/p multiple chemo regimens, currently on Irinotecan+Avastin, last cycle 2 weeks ago), pulmonary embolism (on Xarelto), restless legs syndrome, HTN/HLD who presented on 7/19 for generalized weakness. Since March 2021 she has had progressive worsening shortness of breath with productive cough containing greenish-yellowish sputum. Her cough was originally attributed as a side effect of 5-FU chemo but it continued even after that chemo regimen was discontinued. Prior to hospitalization, Asha Malone from Pulmonary was consulted and ordered sputum culture, prednisone taper, levofloxacin, and robitussin; however, patient was admitted to the hospital before these could be performed. Patient was admitted to the hospital for profound weakness, shortness of breath, cough and dehydration. Pulmonary has been consulted to further evaluate this shortness of breath.    Regarding cough: Patient states the cough is so bad sometimes she cannot sleep at night. Concomitant symptoms include shortness of breath and sore throat without hemoptysis, fever or chills. She does not have a history of lung disease and she does not smoke, use illicit drugs, or drink alcohol. She previously went to a doctor who attributed her symptoms to GERD and prescribed her omeprazole which was later changed to pantoprazole; pantoprazole did help her symptoms somewhat but the cough has still continued.    Review of Systems: Review of Systems   Constitutional: Positive for malaise/fatigue. Negative for chills, diaphoresis and fever.   HENT: Positive for sore throat.  Negative for sinus pain.    Respiratory: Positive for cough, sputum production and shortness of breath. Negative for hemoptysis and wheezing.    Cardiovascular: Negative for chest pain, palpitations, leg swelling and PND.   Gastrointestinal: Positive for heartburn. Negative for abdominal pain, nausea and vomiting.   Musculoskeletal: Negative for falls.   Neurological: Positive for weakness. Negative for sensory change, speech change, focal weakness and headaches.   Psychiatric/Behavioral: Negative for memory loss. The patient has insomnia.        Past Medical History:   Past Medical History:   Diagnosis Date   • Adverse effect of anesthesia     elevated BP postop   • Anesthesia    • Arrhythmia     occasional   • Blood clotting disorder (HCC) 08/2015    bilat PE    • Blood transfusion 1957   • Breath shortness     pt reports d/t chemo treatment; no O2; with moderate exertion; no c/o at this time   • Cancer (HCC) 09/2012    rectal cancer,  Liver CA-2015   • CATARACT     removed b/l   • Chemotherapy-induced neutropenia (HCC) 9/28/2016   • Chickenpox     history of   • Colon cancer (HCC)    • Dental disorder     dentures UPPERS AND LOWERS   • Elevated alkaline phosphatase level 11/15/2017   • Emphysema of lung (HCC)     COPD   • Fall    • Thai measles     history of   • Heart murmur     as a child   • Hemorrhagic disorder (HCC)     on Xarelto    • High cholesterol    • Hypercholesteremia    • Hypertension     no meds currently    • Ileostomy in place (HCC)    • Ileostomy in place (HCC)    • Indigestion    • Influenza     history of   • Lightheadedness 9/17/2015   • Mumps     history of   • Obstruction     ileostomy   • Other specified symptom associated with female genital organs     HYSTERECTOMY 1993   • Pneumonia 05/2017    tx'd with antibx   • Pulmonary embolism (HCC)    • 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)    • Renal insufficiency 3/14/2016   • Restless legs 5/6/2020   • Routine general medical  examination at a [facility] care facility 3/14/2016    3/14/16   • Seasonal allergies    • Swelling of both ankles     Lasix PRN   • Tonsillitis        Past Surgical History:   Past Surgical History:   Procedure Laterality Date   • HEPATIC ABLATION  5/28/2020    Procedure: ABLATION, LESION, LIVER-TRISEGMENTECTOMY, MICROWAVE ABLATION, INTRA OPERATIVE ULTRA SOUND, SIMPLE RESECTED / REPAIR OF DIAPHRAGM;  Surgeon: Kit West M.D.;  Location: SURGERY [facility];  Service: General   • HEPATIC ABLATION LAPAROSCOPIC  11/15/2018    Procedure: HEPATIC ABLATION LAPAROSCOPIC.- SEGMENT 7/8;  Surgeon: Kit West M.D.;  Location: SURGERY [facility];  Service: General   • COLONOSCOPY WITH BIOPSY  8/23/2015    Procedure: COLONOSCOPY WITH BIOPSY;  Surgeon: Wilbur Glasgow M.D.;  Location: ENDOSCOPY [facility];  Service:    • HEPATIC ABLATION LAPAROSCOPIC  7/6/2015    Procedure: HEPATIC ABLATION LAPAROSCOPIC.;  Surgeon: Kit West M.D.;  Location: SURGERY [facility];  Service:    • CATH PLACEMENT Left 7/6/2015    Procedure: CATH PLACEMENT CEPHALIC POWERPORT;  Surgeon: Kit West M.D.;  Location: SURGERY [facility];  Service:    • FISTULA IN ANO REPAIR  1/28/2015    Performed by Kolton Montgomery M.D. at SURGERY [facility]   • PERINEAL PROCEDURE  1/28/2015    Performed by Kolton Montgomery M.D. at SURGERY [facility]   • FISTULA IN ANO REPAIR  10/15/2014    Performed by Kolton Montgomery M.D. at SURGERY [facility]   • PERINEAL PROCEDURE  10/15/2014    Performed by Kolton Montgomery M.D. at SURGERY [facility]   • IRRIGATION & DEBRIDEMENT GENERAL  2/19/2014    Performed by Kolton Montgomery M.D. at [facility]   • FLAP GRAFT  2/19/2014    Performed by Kolton Montgomery M.D. at [facility]   • PERINEAL PROCEDURE  12/18/2013    Performed by Kolton Montgomery M.D. at SURGERY [facility]   • MYOCUTANEOUS FLAP  12/18/2013    Performed by Kolton DESAI  MARIANA Montgomery at SURGERY [facility]   • IRRIGATION & DEBRIDEMENT GENERAL  10/23/2013    Performed by Kolton Montgomery M.D. at SURGERY [facility]   • FISTULA IN ANO REPAIR  9/4/2013    Performed by Kolton Montgomery M.D. at [facility]   • FLAP ROTATION  9/4/2013    Performed by Kolton Montgomery M.D. at SURGERY [facility]   • RECOVERY  8/26/2013    Performed by [facility]Recovery Surgery at [facility] SAME DAY [facility]   • BIOPSY GENERAL  7/17/2013    Performed by Kolton Montgomery M.D. at SURGERY [facility]   • PROCTOSCOPY  7/17/2013    Performed by Kolton Montgomery M.D. at SURGERY [facility]   • FISTULA IN ANO REPAIR  2/27/2013    Performed by Kolton Montgomery M.D. at SURGERY [facility]   • SIGMOIDOSCOPY  2/27/2013    Performed by Kolton Montgomery M.D. at SURGERY [facility]   • LAPAROSCOPY  10/8/2012    Performed by Kolton Montgomery M.D. at SURGERY [facility]   • ILEO LOOP DIVERSION  10/8/2012    Performed by Kolton Montgomery M.D. at SURGERY [facility]   • COLECTOMY  9/26/2012    Performed by Kolton Montgomery M.D. at [facility]   • COLONOSCOPY FLEX W/ENDO US  9/20/2012    Performed by Harshil Bolton M.D. at SURGERY [facility]   • OTHER  2009    left eye torn retina repair   • CATARACT EXTRACTION WITH IOL  2004    bilateral   • ABDOMINAL HYSTERECTOMY TOTAL  1983   • CYST EXCISION  1972    ovarian   • COLON RESECTION     • EYE SURGERY      cataracts   • HYSTERECTOMY LAPAROSCOPY     • PB REMV 2ND CATARACT,CORN-SCLER SECTN     • TONSILLECTOMY         Family History: family history includes Alcohol/Drug in her brother; Cancer in her brother and mother; Cancer (age of onset: 60) in her maternal aunt; Heart Disease in her mother; Heart Failure in her mother; Hyperlipidemia in her mother; Hypertension in her mother; Kidney Disease in her father; Lung Disease in her brother.    Social History:  reports that she has never smoked. She has never used smokeless tobacco. She reports that  "she does not drink alcohol and does not use drugs.    Objective:   Vitals/ General Appearance:   Weight/BMI: Body mass index is 22.96 kg/m².  /61   Pulse 61   Temp 36.4 °C (97.6 °F) (Temporal)   Resp 18   Ht 1.651 m (5' 5\")   Wt 62.6 kg (138 lb)   SpO2 92%   Vitals:    07/20/21 0030 07/20/21 0100 07/20/21 0430 07/20/21 0748   BP: 128/71 125/74 108/71 105/61   Pulse: 83 93 81 61   Resp: 20 18 18 18   Temp:  36.8 °C (98.2 °F) 36.7 °C (98.1 °F) 36.4 °C (97.6 °F)   TempSrc:  Temporal Temporal Temporal   SpO2: 88% 93% 92%    Weight:       Height:         Oxygen Therapy:  Pulse Oximetry: 92 %, O2 (LPM): 0, O2 Delivery Device: None - Room Air    Constitutional:   Ill-appearing, thin, No acute distress. Upon re-examination, patient had just finished vomiting.  HENT:  Normocephalic, Atraumatic, Oropharynx moist mucous membranes, Alopecia present. Occasional coughing with greenish-yellowish sputum.  Eyes:  EOMI, Conjunctiva normal, No discharge.  Neck:  Normal range of motion  Cardiovascular:  Normal heart rate, Normal rhythm, Extremities with intact distal pulses, no cyanosis, or edema.  Lungs:  Diffuse wheezes, located primarily in RLL.   Abdomen: Bowel sounds normal, Soft, No tenderness, No guarding  Skin: Warm, Dry, No erythema, No rash, no induration.  Neurologic: Alert & oriented x 4, No focal deficits noted, cranial nerves II through X are grossly intact.  Psychiatric: Affect normal, Judgment normal, Mood normal.    Labs:  Recent Labs     07/19/21 2200 07/20/21  0415   WBC 8.6 6.6   RBC 4.42 3.66*   HEMOGLOBIN 11.4* 9.5*   HEMATOCRIT 36.3* 30.5*   MCV 82.1 83.3   MCH 25.8* 26.0*   MCHC 31.4* 31.1*   RDW 57.1* 57.8*   PLATELETCT 533* 394   MPV 9.7 9.3                 Recent Labs     07/19/21 2200 07/20/21 0415   SODIUM 129* 132*   POTASSIUM 5.0 4.5   CHLORIDE 95* 102   CO2 17* 17*   GLUCOSE 107* 95   BUN 39* 31*     Recent Labs     07/19/21 2200 07/20/21 0415   SODIUM 129* 132*   POTASSIUM 5.0 4.5 "   CHLORIDE 95* 102   CO2 17* 17*   BUN 39* 31*   CREATININE 1.63* 1.18   MAGNESIUM 2.0  --    CALCIUM 9.9 8.5     Results for orders placed or performed during the hospital encounter of 10/06/17   ECHOCARDIOGRAM COMP W/O CONT   Result Value Ref Range    Eject.Frac. MOD BP 56.87     Eject.Frac. MOD 4C 53.7     Eject.Frac. MOD 2C 60.84     Left Ventrical Ejection Fraction 60          Imaging:   DX-CHEST-PORTABLE (1 VIEW)   Final Result         1.  Hazy right lung base opacities, concerning for infiltrates          Hospital Medications:    Current Facility-Administered Medications:   •  umeclidinium-vilanterol (ANORO ELLIPTA) inhaler 1 Puff, 1 Puff, Inhalation, QDAILY (RT), Olman Dwyer M.D.  •  albuterol (PROVENTIL) 2.5 mg/0.5 mL nebulizer solution 2.5 mg, 2.5 mg, Nebulization, Q4H PRN (RT), Olman Dwyer M.D.  •  rivaroxaban (XARELTO) tablet 10 mg, 10 mg, Oral, QDAY, Olman Dwyer M.D., 10 mg at 07/20/21 0259  •  senna-docusate (PERICOLACE or SENOKOT S) 8.6-50 MG per tablet 2 tablet, 2 tablet, Oral, BID **AND** polyethylene glycol/lytes (MIRALAX) PACKET 1 Packet, 1 Packet, Oral, QDAY PRN **AND** magnesium hydroxide (MILK OF MAGNESIA) suspension 30 mL, 30 mL, Oral, QDAY PRN **AND** bisacodyl (DULCOLAX) suppository 10 mg, 10 mg, Rectal, QDAY PRN, Olman Dwyer M.D.  •  NS infusion 2,000 mL, 2,000 mL, Intravenous, Continuous, Olman Dwyer M.D., Last Rate: 150 mL/hr at 07/20/21 0105, 2,000 mL at 07/20/21 0105  •  acetaminophen (Tylenol) tablet 650 mg, 650 mg, Oral, Q6HRS PRN, Olman Dwyer M.D.  •  labetalol (NORMODYNE/TRANDATE) injection 10 mg, 10 mg, Intravenous, Q4HRS PRN, Olman Dwyer M.D.    Current Outpatient Medications:  Medications Prior to Admission   Medication Sig Dispense Refill Last Dose   • STIOLTO RESPIMAT 2.5-2.5 MCG/ACT Aero Soln INHALE 2 PUFFS BY MOUTH ONCE DAILY 1 Each 5 7/19/2021 at AM   • predniSONE (DELTASONE) 10 MG Tab Take 40mg x 4 days, then take 30mg x 4 days, then take 20mg x  4 days, then 10mg x 4 days with food, then discontinue. 40 tablet 0 NEW at Never Filled   • levoFLOXacin (LEVAQUIN) 500 MG tablet Take 1 tablet by mouth every day. 10 tablet 0 NEW at Never Filled   • Hydrocod Polst-CPM Polst ER (TUSSIONEX PENNKINETIC ER) 10-8 MG/5ML Suspension Extended Release Take 5 mL by mouth every 12 hours as needed for Cough or Rhinitis for up to 28 days. 140 mL 0 NEW at Never Filled   • diphenoxylate-atropine (LOMOTIL) 2.5-0.025 MG Tab Take 1 tablet by mouth 4 times a day as needed for Diarrhea for up to 7 days. 28 tablet 0 PRN at PRN   • ondansetron (ZOFRAN) 4 MG Tab tablet Take 1 tablet by mouth every four hours as needed for Nausea/Vomiting (for nausea, vomiting). 30 tablet 6 PRN at PRN   • lidocaine-prilocaine (EMLA) 2.5-2.5 % Cream Apply to port one hour prior to access and cover with plastic wrap. 1 Each 3 7/18/2021 at K   • pantoprazole (PROTONIX) 40 MG Tablet Delayed Response Take 1 tablet by mouth every day. 90 tablet 3 Last week at [facility]   • albuterol (PROVENTIL) 2.5mg/3ml Nebu Soln solution for nebulization Take 3 mL by nebulization every four hours as needed for Shortness of Breath. 120 mL 11 New at Never Filled   • albuterol 108 (90 Base) MCG/ACT Aero Soln inhalation aerosol Inhale 2 Puffs every 6 hours as needed for Shortness of Breath. 1 Each 11 7/18/2021 at PRN   • potassium chloride ER (KLOR-CON) 10 MEQ tablet Take 1 tablet by mouth every day. 100 tablet 3 Last week at [facility]   • rivaroxaban (XARELTO) 10 MG Tab tablet Take 1 Tab by mouth every day. 30 Tab 5 7/18/2021 at 0000   • cyanocobalamin (VITAMIN B-12) 500 MCG Tab Take 1,000 mcg by mouth every day.   Last week at [facility]   • Multiple Vitamins-Minerals (CENTRUM SILVER PO) Take 1 Tab by mouth every day.   Last week at [facility]   • Cholecalciferol (VITAMIN D3) 400 UNITS CAPS Take 1 Cap by mouth every day.   Last week at [facility]   • loratadine (CLARITIN) 10 MG TABS Take 10 mg by mouth every day. Indications: Hayfever   Last week at [facility]  "      Medication Allergy:  Allergies   Allergen Reactions   • Oxaliplatin Anaphylaxis   • Codeine      \"gets drunk\"   • Pcn [Penicillins] Itching     itching   • Sulfa Drugs Itching     itching   • Tape Rash     PAPER TAPE OK       Assessment and Plan:  Active Problems:    Malignant neoplasm of rectum (HCC) (Chronic) POA: Yes    Anemia POA: Yes    Secondary malignant neoplasm of liver (HCC) (Chronic) POA: Yes    Dehydration POA: Yes    History of pulmonary embolus (PE) POA: Yes    Shortness of breath POA: Yes    Acute renal failure (ARF) (HCC) POA: Unknown    Hyponatremia POA: Unknown  Resolved Problems:    * No resolved hospital problems. *    80 y/o F with PMH significant for colorectal cancer w/ mets to liver (dx 2012, s/p multiple chemo regimens, currently on Irinotecan+Avastin, last cycle 2 weeks ago), pulmonary embolism (on Xarelto), restless legs syndrome, HTN/HLD who presented on 7/19 for generalized weakness. Pulmonary has been consulted for persistent productive cough lasting 3+ months.    Per our reading of imaging: hyperinflated lung fields, indicative of severe emphysematous disease, COPD    #Aspiration pneumonia  #Metastatic colorectal cancer  #S/p chemotherapy  #Pulmonary Hypertension (mild)    Patient possibly has aspiration pneumonia from chronic coughing. WBC not elevated likely due to immunocompromised state. Patient could also have an opportunistic infection. Procal elevated.    -Start levaquin empirically for aspiration pneumonia  -CT chest without contrast (for BRIONNA)  -COVID/flu test  -NT-proBNP  -Sputum cultures: AFB, fungal  -Legionella urine + mycoplasma tests  -Guaifenesin for expectoration  -Cough drops for symptomatic relief    We will continue to follow. Thank you for this consult.    Yamileth Colon MD  PGY2  "

## 2021-07-20 NOTE — ED PROVIDER NOTES
ED Provider Note    Scribed for Sonal Dodge M.D. by Lm Ashraf. 7/19/2021  8:35 PM    Primary care provider: Manan Quiñonez M.D.  Means of arrival: Walk-in  History obtained from: Patient  History limited by: None    CHIEF COMPLAINT  Chief Complaint   Patient presents with    Weakness     c/o loss of appetite for the past 6 days. SOB with exertion. Cancer patient ( colorectal ) with lilliana to liver and on chemo every other week.     Cough     productive cough x weeks. greenish phlegm noted. afebrile.        HPI  Karlene Walters is a 81 y.o. female who presents to the Emergency Department for evaluation of generalized weakness. She has colorectal cancer with metastasis to the liver and is on chemo every other week. She states that she has had a cough since June 1st, which has been worsening. The cough is productive, with green, yellow, and black sputum. Her throat is sore secondary to the cough. She is supposed to have sputum testing done tomorrow. Additionally she has shortness of breath and loss of appetite. She has not had any fevers, abdominal pain, or leg swelling. She has been battling cancer for the last ten years, and Dr. Steele is her oncologist. She has had multiple hepatic ablation, and her last PET scan was several months ago. She is on Xarelto.     REVIEW OF SYSTEMS  HEENT:  Sore throat  PULMONARY: shortness of breath, cough, sputum production   GI: no abdominal pain   Neuro: generalized weakness  Musculoskeletal: no swelling, deformity, pain, or joint swelling  Endocrine: weight loss, loss of appetite no fevers    See history of present illness. All other systems are negative. C.    PAST MEDICAL HISTORY   has a past medical history of Adverse effect of anesthesia, Anesthesia, Arrhythmia, Blood clotting disorder (HCC) (08/2015), Blood transfusion (1957), Breath shortness, Cancer (HCC) (09/2012), CATARACT, Chemotherapy-induced neutropenia (HCC) (9/28/2016), Chickenpox, Colon cancer (HCC), Dental  disorder, Elevated alkaline phosphatase level (11/15/2017), Emphysema of lung (HCC), Fall, [place] measles, Heart murmur, Hemorrhagic disorder (HCC), High cholesterol, Hypercholesteremia, Hypertension, Ileostomy in place (HCC), Ileostomy in place (HCC), Indigestion, Influenza, Lightheadedness (9/17/2015), Mumps, Obstruction, Other specified symptom associated with female genital organs, Pneumonia (05/2017), Pulmonary embolism (HCC), 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    SURGICAL HISTORY   has a past surgical history that includes tonsillectomy; abdominal hysterectomy total (1983); eye surgery; cyst excision (1972); colonoscopy flex w/endo us (9/20/2012); laparoscopy (10/8/2012); ileo loop diversion (10/8/2012); fistula in ano repair (2/27/2013); biopsy general (7/17/2013); recovery (8/26/2013); fistula in ano repair (9/4/2013); flap rotation (9/4/2013); irrigation & debridement general (10/23/2013); perineal procedure (12/18/2013); myocutaneous flap (12/18/2013); cataract extraction with iol (2004); irrigation & debridement general (2/19/2014); flap graft (2/19/2014); fistula in ano repair (10/15/2014); perineal procedure (10/15/2014); fistula in ano repair (1/28/2015); perineal procedure (1/28/2015); hepatic ablation laparoscopic (7/6/2015); cath placement (Left, 7/6/2015); colonoscopy with biopsy (8/23/2015); remv 2nd cataract,corn-scler sectn; hysterectomy laparoscopy; colectomy (9/26/2012); sigmoidoscopy (2/27/2013); proctoscopy (7/17/2013); colon resection; other (2009); hepatic ablation laparoscopic (11/15/2018); and hepatic ablation (5/28/2020).    SOCIAL HISTORY  Social History     Tobacco Use    Smoking status: Never Smoker    Smokeless tobacco: Never Used   Vaping Use    Vaping Use: Never used  "  Substance Use Topics    Alcohol use: No    Drug use: No      Social History     Substance and Sexual Activity   Drug Use No       FAMILY HISTORY  Family History   Problem Relation Age of Onset    Heart Disease Mother     Heart Failure Mother     Hypertension Mother     Hyperlipidemia Mother     Cancer Mother     Cancer Maternal Aunt 60        breast ca    Lung Disease Brother         COPD/Emphysema/Smoker    Cancer Brother         prostate cancer    Alcohol/Drug Brother         Alcohol    Kidney Disease Father         renal failure       CURRENT MEDICATIONS  Home Medications       Reviewed by Lucho Pacheco R.N. (Registered Nurse) on 07/19/21 at 2017  Med List Status: Partial     Medication Last Dose Status   albuterol (PROVENTIL) 2.5mg/3ml Nebu Soln solution for nebulization  Active   albuterol 108 (90 Base) MCG/ACT Aero Soln inhalation aerosol  Active   Cholecalciferol (VITAMIN D3) 400 UNITS CAPS  Active   cyanocobalamin (VITAMIN B-12) 500 MCG Tab  Active   diphenoxylate-atropine (LOMOTIL) 2.5-0.025 MG Tab  Active   Hydrocod Polst-CPM Polst ER (TUSSIONEX PENNKINETIC ER) 10-8 MG/5ML Suspension Extended Release  Active   levoFLOXacin (LEVAQUIN) 500 MG tablet  Active   lidocaine-prilocaine (EMLA) 2.5-2.5 % Cream  Active   lidocaine-prilocaine (EMLA) 2.5-2.5 % Cream  Active   loratadine (CLARITIN) 10 MG TABS  Active   Multiple Vitamins-Minerals (CENTRUM SILVER PO)  Active   ondansetron (ZOFRAN) 4 MG Tab tablet  Active   pantoprazole (PROTONIX) 40 MG Tablet Delayed Response  Active   potassium chloride ER (KLOR-CON) 10 MEQ tablet  Active   predniSONE (DELTASONE) 10 MG Tab  Active   prochlorperazine (COMPAZINE) 10 MG Tab  Active   rivaroxaban (XARELTO) 10 MG Tab tablet  Active   STIOLTO RESPIMAT 2.5-2.5 MCG/ACT Aero Soln  Active   Tiotropium Bromide-Olodaterol (STIOLTO RESPIMAT INH)  Active                    ALLERGIES  Allergies   Allergen Reactions    Oxaliplatin Anaphylaxis    Codeine      \"gets drunk\"    Pcn " "[Penicillins] Itching     itching    Sulfa Drugs Itching     itching    Tape Rash     PAPER TAPE OK       PHYSICAL EXAM  VITAL SIGNS: /59   Pulse (!) 125   Temp 36.8 °C (98.2 °F) (Temporal)   Resp 16   Ht 1.651 m (5' 5\")   Wt 62.6 kg (138 lb)   LMP  (LMP Unknown)   SpO2 96%   BMI 22.96 kg/m²     Constitutional: Well developed, No acute distress, Non-toxic appearance. Dehydrated appearing  HEENT: Normocephalic, Atraumatic,  external ears normal, pharynx pink,  Mucous  Membranes moist, No rhinorrhea or mucosal edema  Eyes: PERRL, EOMI, Conjunctiva normal, No discharge.   Neck: Normal range of motion, No tenderness, Supple, No stridor.   Lymphatic: No lymphadenopathy    Cardiovascular: Tachycardic, Regular Rhythm, No murmurs,  rubs, or gallops.   Thorax & Lungs: Lungs clear to auscultation bilaterally, No respiratory distress, No wheezes, rhales or rhonchi, No chest wall tenderness.   Abdomen: Bowel sounds normal, Soft, non tender, non distended,  No pulsatile masses., no rebound guarding or peritoneal signs.   Skin: Warm, Dry, No erythema, No rash,   Back:  No CVA tenderness,  No spinal tenderness, bony crepitance, step offs, or instability.   Neurologic: Alert & oriented clear speech no focal deficits  Extremities: Equal, intact distal pulses, No cyanosis, clubbing or edema,  No tenderness.   Musculoskeletal: Good range of motion in all major joints. No tenderness to palpation or major deformities noted.     DIAGNOSTIC STUDIES / PROCEDURES    LABS  Results for orders placed or performed during the hospital encounter of 07/19/21   CBC w/ Differential   Result Value Ref Range    WBC 8.6 4.8 - 10.8 K/uL    RBC 4.42 4.20 - 5.40 M/uL    Hemoglobin 11.4 (L) 12.0 - 16.0 g/dL    Hematocrit 36.3 (L) 37.0 - 47.0 %    MCV 82.1 81.4 - 97.8 fL    MCH 25.8 (L) 27.0 - 33.0 pg    MCHC 31.4 (L) 33.6 - 35.0 g/dL    RDW 57.1 (H) 35.9 - 50.0 fL    Platelet Count 533 (H) 164 - 446 K/uL    MPV 9.7 9.0 - 12.9 fL    " Neutrophils-Polys 80.90 (H) 44.00 - 72.00 %    Lymphocytes 11.30 (L) 22.00 - 41.00 %    Monocytes 6.90 0.00 - 13.40 %    Eosinophils 0.00 0.00 - 6.90 %    Basophils 0.00 0.00 - 1.80 %    Nucleated RBC 0.00 /100 WBC    Neutrophils (Absolute) 6.96 2.00 - 7.15 K/uL    Lymphs (Absolute) 0.97 (L) 1.00 - 4.80 K/uL    Monos (Absolute) 0.59 0.00 - 0.85 K/uL    Eos (Absolute) 0.00 0.00 - 0.51 K/uL    Baso (Absolute) 0.00 0.00 - 0.12 K/uL    NRBC (Absolute) 0.00 K/uL    Hypochromia 1+     Anisocytosis 1+     Microcytosis 1+    Complete Metabolic Panel (CMP)   Result Value Ref Range    Sodium 129 (L) 135 - 145 mmol/L    Potassium 5.0 3.6 - 5.5 mmol/L    Chloride 95 (L) 96 - 112 mmol/L    Co2 17 (L) 20 - 33 mmol/L    Anion Gap 17.0 (H) 7.0 - 16.0    Glucose 107 (H) 65 - 99 mg/dL    Bun 39 (H) 8 - 22 mg/dL    Creatinine 1.63 (H) 0.50 - 1.40 mg/dL    Calcium 9.9 8.5 - 10.5 mg/dL    AST(SGOT) 27 12 - 45 U/L    ALT(SGPT) 22 2 - 50 U/L    Alkaline Phosphatase 260 (H) 30 - 99 U/L    Total Bilirubin 0.7 0.1 - 1.5 mg/dL    Albumin 3.4 3.2 - 4.9 g/dL    Total Protein 7.5 6.0 - 8.2 g/dL    Globulin 4.1 (H) 1.9 - 3.5 g/dL    A-G Ratio 0.8 g/dL   Troponin STAT   Result Value Ref Range    Troponin T 22 (H) 6 - 19 ng/L   DIFFERENTIAL MANUAL   Result Value Ref Range    Progranulocytes 0.90 %    Manual Diff Status PERFORMED    PERIPHERAL SMEAR REVIEW   Result Value Ref Range    Peripheral Smear Review see below    PLATELET ESTIMATE   Result Value Ref Range    Plt Estimation Increased    MORPHOLOGY   Result Value Ref Range    RBC Morphology Present     Polychromia 1+     Poikilocytosis 1+     Ovalocytes 1+     Echinocytes 1+     Toxic Gran Moderate    ESTIMATED GFR   Result Value Ref Range    GFR If  37 (A) >60 mL/min/1.73 m 2    GFR If Non African American 30 (A) >60 mL/min/1.73 m 2   EKG   Result Value Ref Range    Report       [facility] Emergency Dept.    Test Date:  2021-07-19  Pt Name:    KANG HALL                   Department: ER  MRN:        [id number]                      Room:       GR 28  Gender:     Female                       Technician: [id number]  :        1940                   Requested By:KAREN RUIZ  Order #:    [id number]                    Reading MD: KAREN RUIZ MD    Measurements  Intervals                                Axis  Rate:       119                          P:          81  PA:         147                          QRS:        -90  QRSD:       85                           T:          79  QT:         302  QTc:        425    Interpretive Statements  Sinus tachycardia  Atrial premature complex  Biatrial enlargement  RSR' in V1 or V2, right VCD or RVH  Inferior infarct, old  Compared to ECG 03/10/2021 05:08:54  Atrial abnormality now present  Right ventricular hypertrophy now present  RSR' in V1 or V2 now present  Myocardial infarct finding now present  Sinus  rhythm no longer present  Left anterior fascicular block no longer present  Electronically Signed On 2021 23:10:21 PDT by KAREN RUIZ MD     EKG   Result Value Ref Range    Report       [facility] Emergency Dept.    Test Date:  2021  Pt Name:    KANG HALL                  Department: ER  MRN:        [id number]                      Room:       GR 28  Gender:     Female                       Technician: [id number]  :        1940                   Requested By:KAREN RUIZ  Order #:    [id number]                    Reading MD: KAREN RUIZ MD    Measurements  Intervals                                Axis  Rate:       110                          P:          82  PA:         141                          QRS:        -83  QRSD:       93                           T:          81  QT:         317  QTc:        429    Interpretive Statements  Sinus tachycardia  Left anterior fascicular block  RSR' in V1 or V2, right VCD or RVH  Compared to ECG 2021 20:53:52  Left anterior fascicular block now  present  Atrial premature complex(es) no longer present  Atrial abnormality no longer present  Myocardial infarct finding no longer present  Electronicall y Signed On 7- 23:10:03 PDT by KAREN RUIZ MD       *Note: Due to a large number of results and/or encounters for the requested time period, some results have not been displayed. A complete set of results can be found in Results Review.       All labs reviewed by me.    EKG  12 lead EKG; Interpreted by emergency department physician    RADIOLOGY  DX-CHEST-PORTABLE (1 VIEW)    (Results Pending)     The radiologist's interpretation of all radiological studies have been reviewed by me.    COURSE & MEDICAL DECISION MAKING  Nursing notes, VS, PMSFHx reviewed in chart.    PPE Note: I verified that the patient was wearing a mask and I was wearing appropriate PPE every time I entered the room. The patient's mask was on the patient at all times during my encounter except for a brief view of the oropharynx.     Scribe remained outside the patient's room and did not have any contact with the patient for the duration of patient encounter.     8:35 PM Patient seen and examined at bedside. Intravenous fluids administered for dehydration. Ordered EKG, CT-CTA chest pulmonary artery, Culture respiratory w/gram stain, CBC with differential, CMP, and troponin to evaluate her symptoms. The differential diagnoses include but are not limited to: pneumonia, bronchitis, mets, pleural effusion, anemic    HYDRATION: Based on the patient's presentation of Dehydration the patient was given IV fluids. IV Hydration was used because oral hydration was not adequate alone. Upon recheck following hydration, the patient was improved.    Heart Score is: 2  11:12 PM patient has hyponatremia and acute kidney injury.  Her troponin is slightly elevated.  We will postpone the CT of her chest with contrast until her renal function has improved.  I ordered normal saline for rehydration.  The  patient will be admitted to the hospitalist service for IV fluids and further evaluation.      11:10pm I spoke with the hospitalist Dr. Dwyer who accepts the patient for admission.      FINAL IMPRESSION  1. Cough    2. Dehydration    3. BRIONNA (acute kidney injury) (HCC)          Lm ÁLVAREZ (Scribe), am scribing for, and in the presence of, Sonal Dodge M.D..    Electronically signed by: Lm Ashraf (Scribe), 7/19/2021    Sonal ÁLVAREZ M.D. personally performed the services described in this documentation, as scribed by Lm Ashraf in my presence, and it is both accurate and complete.    The note accurately reflects work and decisions made by me.  Sonal Dodge M.D.  7/19/2021  11:36 PM

## 2021-07-20 NOTE — ASSESSMENT & PLAN NOTE
Patient on Irinotecan/Avastin every 2 weeks - cycle 2 scheduled for 7/20/21. Hold chemo at this time until clinically improved. Plan to follow up in the clinic in 2 weeks to possibly restart treatment if clinically improved.

## 2021-07-20 NOTE — CARE PLAN
Problem: Knowledge Deficit - Standard  Goal: Patient and family/care givers will demonstrate understanding of plan of care, disease process/condition, diagnostic tests and medications  Outcome: Progressing   The patient is Stable - Low risk of patient condition declining or worsening    Shift Goals  Clinical Goals: comfort  Patient Goals: rest    Progress made toward(s) clinical / shift goals:  Pulse on in place for monitoring.     Patient is not progressing towards the following goals:

## 2021-07-20 NOTE — PROGRESS NOTES
4 Eyes Skin Assessment Completed by Kylie Bravo, RN and Kylie Wells, RN.    Head WDL  Ears WDL  Nose WDL  Mouth WDL  Neck WDL  Breast/Chest WDL  Shoulder Blaades WDL  Spine WDL  (R) Arm/Elbow/Hand WDL  (L) Arm/Elbow/Hand WDL  Abdomen WDL ileostomy in place  Groin WDL  Scrotum/Coccyx/Buttocks WDL  (R) Leg WDL  (L) Leg WDL  (R) Heel/Foot/Toe WDL  (L) Heel/Foot/Toe WDL          Devices In Places Central Line port      Interventions In Place Pillows and Pressure Redistribution Mattress    Possible Skin Injury No    Pictures Uploaded Into Epic N/A  Wound Consult Placed N/A  RN Wound Prevention Protocol Ordered No

## 2021-07-20 NOTE — DIETARY
"Nutrition services: Day 1 of admit.  Karlene Walters is a 81 y.o. female with admitting DX of ARF.  Consult received per nutrition admit screen; weight loss (2-13 lb in 3 months) and poor appetite (MST score = 2).     Visited pt at bedside, pt with visual indicators of severe fat and severe muscle loss. Pt states that she has had extremely poor PO intake x 2 months d/t lack of appetite and taste change. Pt reports prior to this she was eating well. When asked to specify degree of reduced PO intake (asked if she was eating </> 50% baseline) pt states she was eating nothing. Pt states that she has not had any GI issues such as nausea or vomiting, just states everything tastes bad. Pt unsure of exact weight from 2 months ago - will clarify in chart review. Pt states that she has tried all supplements, and has a strong dislike for them.    Assessment:  Height: 165.1 cm (5' 5\")  Weight: 62.6 kg (138 lb)  Body mass index is 22.96 kg/m²., BMI classification: WNL  Diet/Intake: CLD; % of clear tray this am    Evaluation:   1. Pt with colorectal cancer with mets to liver - receives chemo every other week  2. Weight hx via chart review:  · 4/20 = 72.3 kg / 159 lb  · 7/19 = 62.6 kg / 138 lb  · 9.7 kg / 21 lb (13%) weight loss in 3 months is severe   3. Labs: Na 132, BUN 31, alk phos 187  4. GI: ileostomy    Malnutrition Risk: Severe chronic disease relation malnutrition r/t metastatic disease, evidenced by severe fat loss (sunken orbital region/protruding zygomatic arches/hollow temporal lobes), severe muscles loss (protruding clavicles/squared shoulders), severely reduced energy intake per pt report < 50% for > 1 month, and severe weight loss of 13% in 3 months.     Recommendations/Plan:  1. Advance diet beyond CLD as medically feasible  2. Pt dislikes nutrition supplements, will discuss high calorie meal options once diet advances   3. Encourage intake of meals  4. Document intake of all meals as % taken in ADL's to " provide interdisciplinary communication across all shifts.   5. Monitor weight.  6. Nutrition rep will continue to see patient for ongoing meal and snack preferences.   7. RD to monitor PO intake, wt trends, and nutrition labs/meds    RD to follow

## 2021-07-20 NOTE — DISCHARGE PLANNING
Anticipated Discharge Disposition: Pending medical team collaboration.    Action: LSW met with patient at bedside to complete assessment and to discuss discharge plan. Patient resides with spouse in mobile home and is daughter who lives across the street assist with ADLs and IADLs. Spouse has COPD and is limited in how much support he can provide. Patient has a SPC and 4WW available to her at home. Patient uses the [facility] pharmacy in [place]. Patient's spouse can assist with transportation home if discharge home.    Barriers to Discharge: Medical clearance.    Plan: CM to continue to follow for discharge needs.    Care Transition Team Assessment    Information Source  Orientation Level: Oriented X4  Information Given By: Patient  Informant's Name:  (Karlene Walters)  Who is responsible for making decisions for patient? : Patient    Readmission Evaluation  Is this a readmission?: No    Elopement Risk  Legal Hold: No  Ambulatory or Self Mobile in Wheelchair: Yes  Disoriented: No  Psychiatric Symptoms: None  History of Wandering: No  Elopement this Admit: No  Vocalizing Wanting to Leave: No  Displays Behaviors, Body Language Wanting to Leave: No-Not at Risk for Elopement  Elopement Risk: Not at Risk for Elopement    Interdisciplinary Discharge Planning  Primary Care Physician:  (Dr. Quiñonez, 6 months.)  Lives with - Patient's Self Care Capacity: Spouse  Patient or legal guardian wants to designate a caregiver: No  Support Systems: Children, Family Member(s), Spouse / Significant Other  Housing / Facility: Mobile Home  Able to Return to Previous ADL's: Future Time w/Therapy  Mobility Issues: Yes  Prior Services: None  Patient Prefers to be Discharged to::  (Home with family.)  Durable Medical Equipment: Walker, Other - Specify (Has 4WW and SPC.)    Discharge Preparedness  What is your plan after discharge?: Uncertain - pending medical team collaboration  What are your discharge supports?: Child, Spouse  Prior Functional  Level: Needs Assist with Activities of Daily Living, Uses Cane, Uses Walker  Difficulity with ADLs: Bathing, Dressing, Toileting, Walking  Difficulity with IADLs: Cooking, Driving, Laundry, Managing medication, Shopping    Functional Assesment  Prior Functional Level: Needs Assist with Activities of Daily Living, Uses Cane, Uses Walker    Finances  Financial Barriers to Discharge: No  Prescription Coverage: Yes    Advance Directive  Advance Directive?: None    Domestic Abuse  Have you ever been the victim of abuse or violence?: No  Physical Abuse or Sexual Abuse: No  Verbal Abuse or Emotional Abuse: No  Possible Abuse/Neglect Reported to:: Not Applicable    Psychological Assessment  History of Substance Abuse: None  History of Psychiatric Problems: No    Discharge Risks or Barriers  Discharge risks or barriers?: No  Patient risk factors: Vulnerable adult

## 2021-07-20 NOTE — PROGRESS NOTES
Hem/Onc Visit     Date of Admit:  7/19/2021  HD#: 1      Chief Complaint/Admit dx:  Weakness (c/o loss of appetite for the past 6 days. SOB with exertion. Cancer patient ( colorectal ) with lilliana to liver and on chemo every other week. ) and Cough (productive cough x weeks. greenish phlegm noted. afebrile. 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    Subjective:  HPI/Interval History:  Patient admitted for profound weakness, SOB, cough and dehydration.     Patient s/p 1 cycle of new chemo, Irinotecan & Avastin q2 weeks, due for cycle 2 today. Toxicity check last week patient had diarrhea per ostomy, expected s/e of chemo. She was given lomotil with improvement of her diarrhea. Patient with no appetite and not drinking fluids, leading to dehydration and protein-calorie malnutrition. Patient with increased weakness and unable to ambulate in her home without profound SOB.     Patient spoke to pulmonary and plan was to do a sputum culture, but she was admitted. Patient has been seen by pulmonary for these symptoms recently - see below for details. Patient with severe coughing fits, with green thick sputum.       Pertinent cancer history:  Patient with a long history of rectal carcinoma initially diagnosed in 2012.  She was found to have metastatic disease to liver and lung in 2015.  She is status post liver ablation as well as has received Y 90 in the past.  She was on XELOX initially but had allergic reaction to oxaliplatin (last dose 7/28/15) and was switched to single agent 5-FU, Oct 2015. May 2020 there was noted to have growth of disease within the liver and was seen by Dr. West, surgeon.  Patient did undergo surgery in hopes of resection of the tumor but it was felt to be too close to the hilum and therefore she did undergo ablation instead on 5/28/20.  She has been back on single agent 5-FU, restarted on 6/16/20. CT 10/06/20 showed the tumor ablation cavities within the right lobe of the  liver have decreased in size since previous examination.  There is a 1.5 x 3.1 cm focal area of enhancement adjacent to the ablation cavity that appears to have increased concerning for possible tumor recurrence.  Based on that finding she was sent for an MRI for further evaluation on 10/8/2020 which confirmed that the appearance of the masses noted in the liver did not have an appearance for suspicious finding for recurrent metastatic disease.  Patient continued with MRI follow up imaging every 3 months, most recent on 4/5/21 showed stable appearance of the liver with distortion of the right lobe consistent with prior partial hepatectomy and ablation.  No evidence of recurrent tumor.  Enhancing soft tissue nodule in the right cardiophrenic angle again seen and unchanged consistent with adenopathy, measuring 17 mm in size.      March 2021 patient had experienced significant fatigue and shortness of breath.  This had progressively worsened. She was hospitalized in March. She was seen by cardiology and echocardiogram completed which showed an ejection fraction of 65%.  CT chest with contrast completed in March was stable. Continued and progressively worsening SOB, repeat CT chest 5/18/2021. Patient was seen by pulmonary 5/19/2021 and VQ scan negative. Pulmonary believes symptoms r/t GERD with aspiration and recently started on H2 blockers. MRI brain completed on 5/20/2021 showed no evidence of metastatic disease.     Dr. Steele suspected possible 5FU toxicity and was transitioned to Irinotecan/Avastin, first dose 7/6/21.      Review of Systems   Constitutional: Positive for malaise/fatigue and weight loss. Negative for chills and fever.   Respiratory: Positive for cough, sputum production and shortness of breath.    Cardiovascular: Negative for chest pain and palpitations.   Gastrointestinal: Negative for constipation, diarrhea, nausea and vomiting.        No appetite   Genitourinary: Negative for dysuria.  "        Allergies   Allergen Reactions   • Oxaliplatin Anaphylaxis   • Codeine      \"gets drunk\"   • Pcn [Penicillins] Itching     itching   • Sulfa Drugs Itching     itching   • Tape Rash     PAPER TAPE OK         Objective:  /61   Pulse 61   Temp 36.4 °C (97.6 °F) (Temporal)   Resp 18   Ht 1.651 m (5' 5\")   Wt 62.6 kg (138 lb)   LMP  (LMP Unknown)   SpO2 92%   BMI 22.96 kg/m²       Physical Exam  Vitals reviewed.   Constitutional:       General: She is not in acute distress.     Appearance: She is ill-appearing.   Cardiovascular:      Rate and Rhythm: Normal rate and regular rhythm.   Pulmonary:      Effort: No respiratory distress.      Breath sounds: Wheezing (inspiratory wheezing RLL) present.   Neurological:      Mental Status: She is alert and oriented to person, place, and time.   Psychiatric:         Mood and Affect: Mood normal.         Behavior: Behavior normal.                 DX-CHEST-PORTABLE (1 VIEW)    Result Date: 7/19/2021 7/19/2021 11:41 PM HISTORY/REASON FOR EXAM: Cough TECHNIQUE/EXAM DESCRIPTION:  Single AP view of the chest. COMPARISON: June 4, 2021 FINDINGS: Left Port-A-Cath is seen terminating in the right atrium. The cardiac silhouette appears within normal limits. The mediastinal contour appears within normal limits.  The central pulmonary vasculature appears normal. The lungs appear well expanded bilaterally.  Hazy right lung base opacities are seen. No significant pleural effusions are identified. The bony structures appear age-appropriate.     1.  Hazy right lung base opacities, concerning for infiltrates      Assessment and Plan:  Acute renal failure (ARF) ([facility])  Assessment & Plan  Likely r/t to dehydration as patient is not eating or drinking anything at home. Output via ostomy is normal per patient. Patient does tend to have slightly abnormal kidney function as her baseline.     Shortness of breath- (present on admission)  Assessment & Plan  Patient with severe SOB " and persistent/worsening cough with green sputum production. She has seen Dr. Ventura and ANDREW Zee with pulmonary. VQ scan recently completed was negative.     Discussed the case with hospitalist, Dr. Leslye Fletcher regarding patient's respiratory history as this has been going on since March 2021. Discussed calling in pulmonary for consult during hospitalization. CT chest in May was indeterminate - chronic pneumonitis, parenchymal scarring or metastatic disease???    Repeat CT appropriate?    Sputum sample for culture obtained but appears to not be a viable sample as culture not completed.     History of pulmonary embolus (PE)- (present on admission)  Assessment & Plan  Patient has been on a low dose Xarelto 10 mg PO daily for many years d/t increased risk for clots with her metastatic disease.     Dehydration- (present on admission)  Assessment & Plan  Patient is not eating or drinking. Patient currently being hydrated with IV fluids per hospital team. Will defer to hospitalist for continued management and monitoring.     Secondary malignant neoplasm of liver (HCC)- (present on admission)  Assessment & Plan  Patient on Irinotecan/Avastin every 2 weeks - cycle 2 scheduled for today, 7/20/21. Hold chemo at this time until clinically improved.    Continued monitoring of the liver with MRIs - next scheduled 10/18/21 per Dr. West.  Last MRI was stable with no evidence of recurrence.     Malignant neoplasm of rectum (HCC)- (present on admission)  Assessment & Plan  Patient on Irinotecan/Avastin every 2 weeks - cycle 2 scheduled for today, 7/20/21. Hold chemo at this time until clinically improved.       Personally discussed case with Dr. Steele and hospitalist, Dr. Fletcher.       ANDREW Krishnan  [facility] Hematology/Medical Oncology  Office of Dr. Steele  Phone: [phone]  Fax: [phone]

## 2021-07-20 NOTE — ASSESSMENT & PLAN NOTE
Patient with severe SOB and persistent/worsening cough with green sputum production. She has seen Dr. Ventura and ANDREW Zee with pulmonary. VQ scan recently completed was negative.     Pulmonary consulted. CT Chest completed. Nodular infiltrates in the bilateral lung bases. Minimal scattered patchy groundglass pulmonary infiltrates. Patient started on IV antibiotics.    Thank you for assistance from pulmonary. Will defer to pulmonary/hospital team for continued management.

## 2021-07-20 NOTE — ED NOTES
"Med Rec completed: per patient at bedside  Preferred Pharmacy:  Allergies:  Allergies   Allergen Reactions   • Oxaliplatin Anaphylaxis   • Codeine      \"gets drunk\"   • Pcn [Penicillins] Itching     itching   • Sulfa Drugs Itching     itching   • Tape Rash     PAPER TAPE OK       No ORAL antibiotics in last 14 days    Home Medications:  Medication Sig Comments   • STIOLTO RESPIMAT 2.5-2.5 MCG/ACT Aero Soln INHALE 2 PUFFS BY MOUTH ONCE DAILY    • predniSONE (DELTASONE) 10 MG Tab Take 40mg x 4 days, then take 30mg x 4 days, then take 20mg x 4 days, then 10mg x 4 days with food, then discontinue. New prescribed 7.19.21 but never started   • levoFLOXacin (LEVAQUIN) 500 MG tablet Take 1 tablet by mouth every day. New prescribed 7.19.21 but never started   • Hydrocod Polst-CPM Polst ER (TUSSIONEX PENNKINETIC ER) 10-8 MG/5ML Suspension Extended Release Take 5 mL by mouth every 12 hours as needed for Cough or Rhinitis for up to 28 days. New prescribed 7.19.21 but never started   • diphenoxylate-atropine (LOMOTIL) 2.5-0.025 MG Tab Take 1 tablet by mouth 4 times a day as needed for Diarrhea for up to 7 days.    • ondansetron (ZOFRAN) 4 MG Tab tablet Take 1 tablet by mouth every four hours as needed for Nausea/Vomiting (for nausea, vomiting).    • lidocaine-prilocaine (EMLA) 2.5-2.5 % Cream Apply to port one hour prior to access and cover with plastic wrap.    • pantoprazole (PROTONIX) 40 MG Tablet Delayed Response Take 1 tablet by mouth every day.    • albuterol (PROVENTIL) 2.5mg/3ml Nebu Soln solution for nebulization Take 3 mL by nebulization every four hours as needed for Shortness of Breath.    • albuterol 108 (90 Base) MCG/ACT Aero Soln inhalation aerosol Inhale 2 Puffs every 6 hours as needed for Shortness of Breath.    • potassium chloride ER (KLOR-CON) 10 MEQ tablet Take 1 tablet by mouth every day.    • rivaroxaban (XARELTO) 10 MG Tab tablet Take 1 Tab by mouth every day.    • [DISCONTINUED] lidocaine-prilocaine " (EMLA) 2.5-2.5 % Cream Apply to port 1 hour prior to access of port and cover with plastic wrap.    • cyanocobalamin (VITAMIN B-12) 500 MCG Tab Take 1,000 mcg by mouth every day.    • Multiple Vitamins-Minerals (CENTRUM SILVER PO) Take 1 Tab by mouth every day.    • Cholecalciferol (VITAMIN D3) 400 UNITS CAPS Take 1 Cap by mouth every day.    • loratadine (CLARITIN) 10 MG TABS Take 10 mg by mouth every day. Indications: Hayfever        **patient reports has not taken any of her regular medications since last week. Only medication she continued to take regularly was her xarelto

## 2021-07-20 NOTE — ED NOTES
Port access established, labs drawn and sent, IVF started, patient tolerated well, no additional needs at this time.

## 2021-07-20 NOTE — RESPIRATORY CARE
COPD EDUCATION by COPD CLINICAL EDUCATOR  7/20/2021 at 9:51 AM by Jannet Hendricks, RRT     Patient reviewed by COPD education team. Patient does not have a history or diagnosis of COPD and is a non-smoker (never).  Therefore, patient does not qualify for the COPD program. She see's [facility] regularly Dr Ventura with last normal PFT 6/2021

## 2021-07-21 ENCOUNTER — TELEPHONE (OUTPATIENT)
Dept: SLEEP MEDICINE | Facility: MEDICAL CENTER | Age: 81
End: 2021-07-21

## 2021-07-21 PROBLEM — J69.0 ASPIRATION PNEUMONIA (HCC): Status: ACTIVE | Noted: 2021-07-21

## 2021-07-21 LAB
RHODAMINE-AURAMINE STN SPEC: NORMAL
SIGNIFICANT IND 70042: NORMAL
SITE SITE: NORMAL
SOURCE SOURCE: NORMAL

## 2021-07-21 PROCEDURE — 86738 MYCOPLASMA ANTIBODY: CPT

## 2021-07-21 PROCEDURE — 99233 SBSQ HOSP IP/OBS HIGH 50: CPT | Mod: GC | Performed by: INTERNAL MEDICINE

## 2021-07-21 PROCEDURE — 700105 HCHG RX REV CODE 258: Performed by: INTERNAL MEDICINE

## 2021-07-21 PROCEDURE — 99233 SBSQ HOSP IP/OBS HIGH 50: CPT | Performed by: INTERNAL MEDICINE

## 2021-07-21 PROCEDURE — 700102 HCHG RX REV CODE 250 W/ 637 OVERRIDE(OP): Performed by: INTERNAL MEDICINE

## 2021-07-21 PROCEDURE — 700111 HCHG RX REV CODE 636 W/ 250 OVERRIDE (IP): Performed by: STUDENT IN AN ORGANIZED HEALTH CARE EDUCATION/TRAINING PROGRAM

## 2021-07-21 PROCEDURE — 92610 EVALUATE SWALLOWING FUNCTION: CPT

## 2021-07-21 PROCEDURE — A9270 NON-COVERED ITEM OR SERVICE: HCPCS | Performed by: INTERNAL MEDICINE

## 2021-07-21 PROCEDURE — 770004 HCHG ROOM/CARE - ONCOLOGY PRIVATE *

## 2021-07-21 PROCEDURE — 700111 HCHG RX REV CODE 636 W/ 250 OVERRIDE (IP): Performed by: INTERNAL MEDICINE

## 2021-07-21 PROCEDURE — 87449 NOS EACH ORGANISM AG IA: CPT

## 2021-07-21 PROCEDURE — 36415 COLL VENOUS BLD VENIPUNCTURE: CPT

## 2021-07-21 RX ORDER — DIPHENHYDRAMINE HYDROCHLORIDE 50 MG/ML
25 INJECTION INTRAMUSCULAR; INTRAVENOUS EVERY 6 HOURS PRN
Status: DISCONTINUED | OUTPATIENT
Start: 2021-07-21 | End: 2021-07-23 | Stop reason: HOSPADM

## 2021-07-21 RX ORDER — PROCHLORPERAZINE EDISYLATE 5 MG/ML
10 INJECTION INTRAMUSCULAR; INTRAVENOUS EVERY 6 HOURS PRN
Status: DISCONTINUED | OUTPATIENT
Start: 2021-07-21 | End: 2021-07-23 | Stop reason: HOSPADM

## 2021-07-21 RX ORDER — OMEPRAZOLE 20 MG/1
20 CAPSULE, DELAYED RELEASE ORAL 2 TIMES DAILY
Status: DISCONTINUED | OUTPATIENT
Start: 2021-07-21 | End: 2021-07-23 | Stop reason: HOSPADM

## 2021-07-21 RX ADMIN — ONDANSETRON 4 MG: 2 INJECTION INTRAMUSCULAR; INTRAVENOUS at 03:29

## 2021-07-21 RX ADMIN — SODIUM CHLORIDE 3 G: 900 INJECTION INTRAVENOUS at 17:08

## 2021-07-21 RX ADMIN — OMEPRAZOLE 20 MG: 20 CAPSULE, DELAYED RELEASE ORAL at 17:08

## 2021-07-21 RX ADMIN — SODIUM CHLORIDE 3 G: 900 INJECTION INTRAVENOUS at 12:59

## 2021-07-21 RX ADMIN — SODIUM CHLORIDE 2000 ML: 9 INJECTION, SOLUTION INTRAVENOUS at 21:19

## 2021-07-21 RX ADMIN — PROCHLORPERAZINE EDISYLATE 10 MG: 5 INJECTION INTRAMUSCULAR; INTRAVENOUS at 05:19

## 2021-07-21 RX ADMIN — PROCHLORPERAZINE EDISYLATE 10 MG: 5 INJECTION INTRAMUSCULAR; INTRAVENOUS at 17:08

## 2021-07-21 ASSESSMENT — ENCOUNTER SYMPTOMS
MEMORY LOSS: 0
PALPITATIONS: 0
SPEECH CHANGE: 0
COUGH: 1
SORE THROAT: 1
SPUTUM PRODUCTION: 1
NAUSEA: 1
SINUS PAIN: 0
SENSORY CHANGE: 0
HEMOPTYSIS: 0
VOMITING: 1
FEVER: 0
HEADACHES: 0
INSOMNIA: 1
ABDOMINAL PAIN: 1
WEAKNESS: 1
DIAPHORESIS: 0
FALLS: 0
PND: 0
SHORTNESS OF BREATH: 1
WHEEZING: 0
HEARTBURN: 1
FOCAL WEAKNESS: 0
CHILLS: 0

## 2021-07-21 ASSESSMENT — PAIN DESCRIPTION - PAIN TYPE
TYPE: ACUTE PAIN
TYPE: ACUTE PAIN

## 2021-07-21 NOTE — CARE PLAN
Problem: Knowledge Deficit - Standard  Goal: Patient and family/care givers will demonstrate understanding of plan of care, disease process/condition, diagnostic tests and medications  Outcome: Progressing   The patient is Watcher - Medium risk of patient condition declining or worsening    Shift Goals  Clinical Goals: comfort  Patient Goals: rest    Progress made toward(s) clinical / shift goals:  Patient on anti-nausea meds, resting in bed. Awaiting more tests    Patient is not progressing towards the following goals:

## 2021-07-21 NOTE — PROGRESS NOTES
Pulmonary Consultation Note    Patient ID:   Name:             Karlene Walters   YOB: 1940  Age:                 81 y.o.  female   MRN:               [id number]  Referring Provider: Dr. Leslye Fletcher                                                  History of Present Illness:     Patient is an 82 y/o F with PMH significant for colorectal cancer w/ mets to liver (dx 2012, s/p multiple chemo regimens, currently on Irinotecan+Avastin, last cycle 2 weeks ago), pulmonary embolism (on Xarelto), restless legs syndrome, HTN/HLD who presented on 7/19 for generalized weakness. Since March 2021 she has had progressive worsening shortness of breath with productive cough containing greenish-yellowish sputum. Her cough was originally attributed as a side effect of 5-FU chemo but it continued even after that chemo regimen was discontinued. Prior to hospitalization, Asha Malone from Pulmonary was consulted and ordered sputum culture, prednisone taper, levofloxacin, and robitussin; however, patient was admitted to the hospital before these could be performed. Patient was admitted to the hospital for profound weakness, shortness of breath, cough and dehydration. Pulmonary has been consulted to further evaluate this shortness of breath.    Regarding cough: Patient states the cough is so bad sometimes she cannot sleep at night. Concomitant symptoms include shortness of breath and sore throat without hemoptysis, fever or chills. She does not have a history of lung disease and she does not smoke, use illicit drugs, or drink alcohol. She previously went to a doctor who attributed her symptoms to GERD and prescribed her omeprazole which was later changed to pantoprazole; pantoprazole did help her symptoms somewhat but the cough has still continued.    Subjective: Overnight and this morning, the patient became nauseous and vomited greenish colored bile fluid.  This nausea/vomiting was not alleviated by  prochlorperazine.  This morning, patient stated that she had abdominal discomfort from all of the constant coughing and vomiting.  She is not able to sleep well.    Review of Systems: Review of Systems   Constitutional: Positive for malaise/fatigue. Negative for chills, diaphoresis and fever.   HENT: Positive for sore throat. Negative for sinus pain.    Respiratory: Positive for cough, sputum production and shortness of breath. Negative for hemoptysis and wheezing.    Cardiovascular: Negative for chest pain, palpitations, leg swelling and PND.   Gastrointestinal: Positive for abdominal pain, heartburn, nausea and vomiting.        Vomiting green bile   Musculoskeletal: Negative for falls.   Neurological: Positive for weakness. Negative for sensory change, speech change, focal weakness and headaches.   Psychiatric/Behavioral: Negative for memory loss. The patient has insomnia.        Past Medical History:   Past Medical History:   Diagnosis Date   • Adverse effect of anesthesia     elevated BP postop   • Anesthesia    • Arrhythmia     occasional   • Blood clotting disorder (HCC) 08/2015    bilat PE    • Blood transfusion 1957   • Breath shortness     pt reports d/t chemo treatment; no O2; with moderate exertion; no c/o at this time   • Cancer (HCC) 09/2012    rectal cancer,  Liver CA-2015   • CATARACT     removed b/l   • Chemotherapy-induced neutropenia (HCC) 9/28/2016   • Chickenpox     history of   • Colon cancer (HCC)    • Dental disorder     dentures UPPERS AND LOWERS   • Elevated alkaline phosphatase level 11/15/2017   • Emphysema of lung (HCC)     COPD   • Fall    • [place] measles     history of   • Heart murmur     as a child   • Hemorrhagic disorder (HCC)     on Xarelto    • High cholesterol    • Hypercholesteremia    • Hypertension     no meds currently    • Ileostomy in place (HCC)    • Ileostomy in place (HCC)    • Indigestion    • Influenza     history of   • Lightheadedness 9/17/2015   • Mumps     history  "of   • Obstruction     ileostomy   • Other specified symptom associated with female genital organs     HYSTERECTOMY 1993   • Pneumonia 05/2017    tx'd with antibx   • Pulmonary embolism (HCC)    • 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)    • Renal insufficiency 3/14/2016   • Restless legs 5/6/2020   • Routine general medical examination at a health care facility 3/14/2016    3/14/16   • Seasonal allergies    • Swelling of both ankles     Lasix PRN   • Tonsillitis      Objective:   Vitals/ General Appearance:   Weight/BMI: Body mass index is 22.96 kg/m².  /76   Pulse 98   Temp 36.1 °C (96.9 °F) (Temporal)   Resp 17   Ht 1.651 m (5' 5\")   Wt 62.6 kg (138 lb)   SpO2 92%   Vitals:    07/20/21 1701 07/20/21 1923 07/21/21 0407 07/21/21 0816   BP: 116/79 141/80 144/88 144/76   Pulse: 96 100 98 98   Resp: 16 16 16 17   Temp: 36.6 °C (97.9 °F) 36.7 °C (98 °F) 36.6 °C (97.8 °F) 36.1 °C (96.9 °F)   TempSrc: Temporal Temporal Temporal Temporal   SpO2:  93% 93% 92%   Weight:       Height:         Oxygen Therapy:  Pulse Oximetry: 92 %, O2 (LPM): 0, O2 Delivery Device: None - Room Air    Constitutional:   Ill-appearing, thin, No acute distress.  Vomit bag with green bile liquid at bedside.  HENT:  Normocephalic, Atraumatic, Oropharynx moist mucous membranes, Alopecia present. Occasional coughing.  Eyes:  EOMI, Conjunctiva normal, No discharge.  Neck:  Normal range of motion  Cardiovascular:  Normal heart rate, Normal rhythm, Extremities with intact distal pulses, no cyanosis, or edema.  Lungs:  Diffuse wheezes, located primarily in RLL.   Abdomen: Bowel sounds normal, Soft, No tenderness, No guarding  Skin: Warm, Dry, No erythema, No rash, no induration.  Neurologic: Alert & oriented x 4, No focal deficits noted, cranial nerves II through X are grossly intact.  Psychiatric: Affect normal, Judgment normal, Mood normal.    Labs:  Recent Labs     07/19/21  2200 07/20/21  0415   WBC 8.6 6.6   RBC 4.42 3.66* "   HEMOGLOBIN 11.4* 9.5*   HEMATOCRIT 36.3* 30.5*   MCV 82.1 83.3   MCH 25.8* 26.0*   MCHC 31.4* 31.1*   RDW 57.1* 57.8*   PLATELETCT 533* 394   MPV 9.7 9.3                 Recent Labs     07/19/21 2200 07/20/21  0415   SODIUM 129* 132*   POTASSIUM 5.0 4.5   CHLORIDE 95* 102   CO2 17* 17*   GLUCOSE 107* 95   BUN 39* 31*     Recent Labs     07/19/21 2200 07/20/21  0415   SODIUM 129* 132*   POTASSIUM 5.0 4.5   CHLORIDE 95* 102   CO2 17* 17*   BUN 39* 31*   CREATININE 1.63* 1.18   MAGNESIUM 2.0  --    CALCIUM 9.9 8.5     Results for orders placed or performed during the hospital encounter of 10/06/17   ECHOCARDIOGRAM COMP W/O CONT   Result Value Ref Range    Eject.Frac. MOD BP 56.87     Eject.Frac. MOD 4C 53.7     Eject.Frac. MOD 2C 60.84     Left Ventrical Ejection Fraction 60          Imaging:   CT-CHEST (THORAX) W/O   Final Result         1.  Linear nodular infiltrates in the bilateral lung bases. Minimal scattered patchy groundglass pulmonary infiltrates.   2.  No acute thoracic abnormality identified.   3.  Atherosclerosis and atherosclerotic coronary artery disease      Fleischner Society pulmonary nodule recommendations:   Not Applicable         DX-CHEST-PORTABLE (1 VIEW)   Final Result         1.  Hazy right lung base opacities, concerning for infiltrates          Hospital Medications:    Current Facility-Administered Medications:   •  prochlorperazine (COMPAZINE) injection 10 mg, 10 mg, Intravenous, Q6HRS PRN, Efraín Corona M.D., 10 mg at 07/21/21 0519  •  umeclidinium-vilanterol (ANORO ELLIPTA) inhaler 1 Puff, 1 Puff, Inhalation, QDAILY (RT), Olman Dwyer M.D.  •  levoFLOXacin in D5W (LEVAQUIN) IVPB 750 mg, 750 mg, Intravenous, Q24HRS, Leslye Fletcher M.D.  •  ondansetron (ZOFRAN) syringe/vial injection 4 mg, 4 mg, Intravenous, Q4HRS PRN, Leslye Fletcher M.D., 4 mg at 07/21/21 0329  •  guaiFENesin ER (MUCINEX) tablet 600 mg, 600 mg, Oral, Q12HRS, Yamileth Colon M.D., 600 mg at 07/20/21 6227  •   "menthol (HALLS) lozenge 1 Lozenge, 1 Lozenge, Oral, Q2HRS PRN, Yamileth Colon M.D.  •  albuterol (PROVENTIL) 2.5 mg/0.5 mL nebulizer solution 2.5 mg, 2.5 mg, Nebulization, Q4H PRN (RT), Olman Dwyer M.D.  •  rivaroxaban (XARELTO) tablet 10 mg, 10 mg, Oral, QDAY, Olman Dwyer M.D., 10 mg at 07/20/21 2247  •  senna-docusate (PERICOLACE or SENOKOT S) 8.6-50 MG per tablet 2 tablet, 2 tablet, Oral, BID **AND** polyethylene glycol/lytes (MIRALAX) PACKET 1 Packet, 1 Packet, Oral, QDAY PRN **AND** magnesium hydroxide (MILK OF MAGNESIA) suspension 30 mL, 30 mL, Oral, QDAY PRN **AND** bisacodyl (DULCOLAX) suppository 10 mg, 10 mg, Rectal, QDAY PRN, Olman Dwyer M.D.  •  NS infusion 2,000 mL, 2,000 mL, Intravenous, Continuous, Olman Dwyer M.D., Last Rate: 150 mL/hr at 07/20/21 0105, 2,000 mL at 07/20/21 0105  •  acetaminophen (Tylenol) tablet 650 mg, 650 mg, Oral, Q6HRS PRN, Olman Dwyer M.D., 650 mg at 07/20/21 2248  •  labetalol (NORMODYNE/TRANDATE) injection 10 mg, 10 mg, Intravenous, Q4HRS PRN, Olman Dwyer M.D.    Medication Allergy:  Allergies   Allergen Reactions   • Oxaliplatin Anaphylaxis   • Codeine      \"gets drunk\"   • Pcn [Penicillins] Itching     itching   • Sulfa Drugs Itching     itching   • Tape Rash     PAPER TAPE OK       Assessment and Plan:  Active Problems:    Malignant neoplasm of rectum (HCC) (Chronic) POA: Yes    Anemia POA: Yes    Secondary malignant neoplasm of liver (HCC) (Chronic) POA: Yes    Dehydration POA: Yes    History of pulmonary embolus (PE) POA: Yes    Shortness of breath POA: Yes    Acute renal failure (ARF) (HCC) POA: Unknown    Hyponatremia POA: Unknown  Resolved Problems:    * No resolved hospital problems. *    80 y/o F with PMH significant for colorectal cancer w/ mets to liver (dx 2012, s/p multiple chemo regimens, currently on Irinotecan+Avastin, last cycle 2 weeks ago), pulmonary embolism (on Xarelto), restless legs syndrome, HTN/HLD who presented on 7/19 for " generalized weakness. Pulmonary has been consulted for persistent productive cough lasting 3+ months. Per our reading of imaging: hyperinflated lung fields, indicative of severe emphysematous disease, COPD.    With the patient's intractable vomiting in the context of chronic productive cough, this is very concerning for aspiration.  White blood count is not elevated likely due to immunocompromise state; patient could also have an opportunistic infection.  Sputum cultures were not analyzed by lab due to contaminated sputum in the context of constant vomiting.    #Aspiration pneumonia  #Metastatic colorectal cancer  #S/p chemotherapy  #Pulmonary Hypertension (mild)    -Order formal speech-language pathology swallow eval before advancing diet  -Continue guaifenesin and cough drops for expectoration and symptomatic relief  -Continue antiemetics  -Switch antibiotic to Unasyn  -Attempt to reobtain noncontaminated sputum cultures if intractable vomiting improves  -COVID/flu test  -Patient likely needs outpatient follow-up with pulmonology in 6 weeks with repeat CT noncontrast chest    We will continue to follow. Thank you for this consult.    Yamileth Colon MD  PGY2

## 2021-07-21 NOTE — CARE PLAN
The patient is Watcher - Medium risk of patient condition declining or worsening    Shift Goals  Clinical Goals: comfort  Patient Goals: rest    Progress made toward(s) clinical / shift goals:  Patient remains free from falls.     Patient is not progressing towards the following goals:

## 2021-07-21 NOTE — PROGRESS NOTES
[facility]    Date of Admit:  7/19/2021  HD#: 2        Chief Complaint/Admit dx:  Weakness (c/o loss of appetite for the past 6 days. SOB with exertion. Cancer patient ( colorectal ) with lilliana to liver and on chemo every other week. ) and Cough (productive cough x weeks. greenish phlegm noted. afebrile. 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 Cycle 2, day 1 scheduled for yesterday but is on hold at this time during admission.     Subjective:  HPI/Interval History:  Patient still with severe SOB and cough. Saw pulmonary and CT chest w/o completed. Patient stated she has pneumonia.     She does have appetite but on a clear diet. Uncertain as to why on a clear diet but patient discussed with pulmonary today as well.     Continue to hold chemo at this time and will continue to follow patient while inpatient. Will defer to hospitalist team and pulmonary. Thank you for your assistance.     Transfer request to oncology unit placed. Per patient this was discussed with her this morning and CNU charge is aware.

## 2021-07-21 NOTE — HOSPITAL COURSE
Karlene Walters is a 81 y.o. female who presented 7/19/2021 to the Emergency Department for evaluation of generalized weakness. She has colorectal cancer with metastasis to the liver and is on chemo every other week. She states that she has had a cough since June 1st, which has been worsening. The cough is productive, with green, yellow, and black sputum. Her throat is sore secondary to the cough. She is supposed to have sputum testing done tomorrow. Additionally she has shortness of breath and loss of appetite. She has not had any fevers, abdominal pain, or leg swelling. She has been battling cancer for the last ten years, and Dr. Steele is her oncologist. She has had multiple hepatic ablation, and her last PET scan was several months ago. She is on Xarelto.

## 2021-07-21 NOTE — PROGRESS NOTES
[facility] Medicine Daily Progress Note    Date of Service  7/20/2021    Chief Complaint  Karlene Walters is a 81 y.o. female admitted 7/19/2021 with shortness of breath and weakness.    Hospital Course  Karlene Walters is a 81 y.o. female who presented 7/19/2021 to the Emergency Department for evaluation of generalized weakness. She has colorectal cancer with metastasis to the liver and is on chemo every other week. She states that she has had a cough since June 1st, which has been worsening. The cough is productive, with green, yellow, and black sputum. Her throat is sore secondary to the cough. She is supposed to have sputum testing done tomorrow. Additionally she has shortness of breath and loss of appetite. She has not had any fevers, abdominal pain, or leg swelling. She has been battling cancer for the last ten years, and Dr. Steele is her oncologist. She has had multiple hepatic ablation, and her last PET scan was several months ago. She is on Xarelto.       Interval Problem Update  Procalcitonin elevated in the setting of immunocompromise, strong indicator of infection.   Discussed with oncologist and pulmonologist today. Appreciate assistance. Patient feeling a bit better today.    I have personally seen and examined the patient at bedside. I discussed the plan of care with patient, bedside RN, charge RN and .    Consultants/Specialty  oncology and pulmonary    Code Status  Full Code    Disposition  Patient is not medically cleared.   Anticipate discharge to to home with close outpatient follow-up.  I have placed the appropriate orders for post-discharge needs.    Review of Systems  Review of Systems   Constitutional: Positive for malaise/fatigue.   Respiratory: Positive for cough and sputum production.    Gastrointestinal: Positive for nausea and vomiting.   All other systems reviewed and are negative.       Physical Exam  Temp:  [36.4 °C (97.6 °F)-36.8 °C (98.2 °F)] 36.7 °C (98 °F)  Pulse:   [] 100  Resp:  [16-20] 16  BP: (105-141)/(61-80) 141/80  SpO2:  [88 %-95 %] 93 %    Physical Exam  Vitals and nursing note reviewed.   Constitutional:       General: She is not in acute distress.  HENT:      Head: Normocephalic and atraumatic. Hair is abnormal (absent).      Right Ear: External ear normal.      Left Ear: External ear normal.      Nose: Nose normal.      Mouth/Throat:      Mouth: Mucous membranes are moist.      Pharynx: Oropharynx is clear.   Eyes:      General: No scleral icterus.     Conjunctiva/sclera: Conjunctivae normal.   Cardiovascular:      Rate and Rhythm: Normal rate and regular rhythm.   Pulmonary:      Effort: Pulmonary effort is normal.      Breath sounds: Wheezing present.   Chest:      Comments: Port in place.  Abdominal:      Palpations: Abdomen is soft.      Tenderness: There is no abdominal tenderness. There is no guarding.   Musculoskeletal:         General: No swelling or deformity.      Cervical back: Normal range of motion and neck supple.   Skin:     General: Skin is warm and dry.   Neurological:      General: No focal deficit present.      Mental Status: She is alert and oriented to person, place, and time.   Psychiatric:         Mood and Affect: Mood normal.         Behavior: Behavior normal.         Fluids    Intake/Output Summary (Last 24 hours) at 7/20/2021 2347  Last data filed at 7/20/2021 1200  Gross per 24 hour   Intake 440 ml   Output --   Net 440 ml       Laboratory  Recent Labs     07/19/21 2200 07/20/21  0415   WBC 8.6 6.6   RBC 4.42 3.66*   HEMOGLOBIN 11.4* 9.5*   HEMATOCRIT 36.3* 30.5*   MCV 82.1 83.3   MCH 25.8* 26.0*   MCHC 31.4* 31.1*   RDW 57.1* 57.8*   PLATELETCT 533* 394   MPV 9.7 9.3     Recent Labs     07/19/21 2200 07/20/21  0415   SODIUM 129* 132*   POTASSIUM 5.0 4.5   CHLORIDE 95* 102   CO2 17* 17*   GLUCOSE 107* 95   BUN 39* 31*   CREATININE 1.63* 1.18   CALCIUM 9.9 8.5                   Imaging  CT-CHEST (THORAX) W/O   Final Result          1.  Linear nodular infiltrates in the bilateral lung bases. Minimal scattered patchy groundglass pulmonary infiltrates.   2.  No acute thoracic abnormality identified.   3.  Atherosclerosis and atherosclerotic coronary artery disease      Fleischner Society pulmonary nodule recommendations:   Not Applicable         DX-CHEST-PORTABLE (1 VIEW)   Final Result         1.  Hazy right lung base opacities, concerning for infiltrates           Assessment/Plan  Hyponatremia  Assessment & Plan  Hypovolemic, likely hypotonic, trial normal saline.  Follow-up serial chemistry    Acute renal failure (ARF) (HCC)  Assessment & Plan  Largely prerenal, IV fluid challenge, avoid nephrotoxic meds and doses, follow-up chemistry    Secondary malignant neoplasm of liver (HCC)- (present on admission)  Assessment & Plan  Obtain oncology consult in a.m.    Anemia- (present on admission)  Assessment & Plan  Likely secondary to malignancy, follow-up anemia labs    Malignant neoplasm of rectum (HCC)- (present on admission)  Assessment & Plan  Obtain oncology consult in a.m.         VTE prophylaxis: SCDs/TEDs and therapeutic anticoagulation with rivaroxaban    I have performed a physical exam and reviewed and updated ROS and Plan today (7/20/2021). In review of yesterday's note (7/19/2021), there are no changes except as documented above.

## 2021-07-21 NOTE — PROGRESS NOTES
[facility] Medicine Daily Progress Note    Date of Service  7/21/2021    Chief Complaint  Karlene Walters is a 81 y.o. female admitted 7/19/2021 with shortness of breath and weakness.    Hospital Course  Karlene Walters is a 81 y.o. female who presented 7/19/2021 to the Emergency Department for evaluation of generalized weakness. She has colorectal cancer with metastasis to the liver and is on chemo every other week. She states that she has had a cough since June 1st, which has been worsening. The cough is productive, with green, yellow, and black sputum. Her throat is sore secondary to the cough. She is supposed to have sputum testing done tomorrow. Additionally she has shortness of breath and loss of appetite. She has not had any fevers, abdominal pain, or leg swelling. She has been battling cancer for the last ten years, and Dr. Steele is her oncologist. She has had multiple hepatic ablation, and her last PET scan was several months ago. She is on Xarelto.       Interval Problem Update  Evidence of aspiration per Pulmonologist.  Trial unasyn with prn benadryl.  Nauseated on Zofran, added compazine.    I have personally seen and examined the patient at bedside. I discussed the plan of care with patient, bedside RN, charge RN,  and oncology and pulmonary.    Consultants/Specialty  oncology and pulmonary    Code Status  Full Code    Disposition  Patient is not medically cleared. Requested transfer to CNU.  Anticipate discharge to to home with close outpatient follow-up.  I have placed the appropriate orders for post-discharge needs.    Review of Systems  Review of Systems   Constitutional: Positive for malaise/fatigue.   Respiratory: Positive for cough and sputum production.    Gastrointestinal: Positive for nausea and vomiting.   All other systems reviewed and are negative.       Physical Exam  Temp:  [36.1 °C (96.9 °F)-36.7 °C (98 °F)] 36.5 °C (97.7 °F)  Pulse:  [] 108  Resp:  [16-17] 16  BP:  (116-144)/(73-88) 137/73  SpO2:  [92 %-94 %] 94 %    Physical Exam  Vitals and nursing note reviewed.   Constitutional:       General: She is not in acute distress.  HENT:      Head: Normocephalic and atraumatic. Hair is abnormal (absent).      Right Ear: External ear normal.      Left Ear: External ear normal.      Nose: Nose normal.      Mouth/Throat:      Mouth: Mucous membranes are moist.      Pharynx: Oropharynx is clear.   Eyes:      General: No scleral icterus.     Conjunctiva/sclera: Conjunctivae normal.   Cardiovascular:      Rate and Rhythm: Normal rate and regular rhythm.   Pulmonary:      Effort: Pulmonary effort is normal.      Breath sounds: Normal breath sounds.   Chest:      Comments: Port in place.  Abdominal:      Palpations: Abdomen is soft.      Tenderness: There is no abdominal tenderness. There is no guarding.   Musculoskeletal:         General: No swelling or deformity.      Cervical back: Normal range of motion and neck supple.   Skin:     General: Skin is warm and dry.   Neurological:      General: No focal deficit present.      Mental Status: She is alert and oriented to person, place, and time.   Psychiatric:         Mood and Affect: Mood normal.         Behavior: Behavior normal.         Fluids    Intake/Output Summary (Last 24 hours) at 7/21/2021 1640  Last data filed at 7/21/2021 1521  Gross per 24 hour   Intake --   Output 600 ml   Net -600 ml       Laboratory  Recent Labs     07/19/21 2200 07/20/21  0415   WBC 8.6 6.6   RBC 4.42 3.66*   HEMOGLOBIN 11.4* 9.5*   HEMATOCRIT 36.3* 30.5*   MCV 82.1 83.3   MCH 25.8* 26.0*   MCHC 31.4* 31.1*   RDW 57.1* 57.8*   PLATELETCT 533* 394   MPV 9.7 9.3     Recent Labs     07/19/21 2200 07/20/21  0415   SODIUM 129* 132*   POTASSIUM 5.0 4.5   CHLORIDE 95* 102   CO2 17* 17*   GLUCOSE 107* 95   BUN 39* 31*   CREATININE 1.63* 1.18   CALCIUM 9.9 8.5                   Imaging  CT-CHEST (THORAX) W/O   Final Result         1.  Linear nodular infiltrates in  the bilateral lung bases. Minimal scattered patchy groundglass pulmonary infiltrates.   2.  No acute thoracic abnormality identified.   3.  Atherosclerosis and atherosclerotic coronary artery disease      Fleischner Society pulmonary nodule recommendations:   Not Applicable         DX-CHEST-PORTABLE (1 VIEW)   Final Result         1.  Hazy right lung base opacities, concerning for infiltrates           Assessment/Plan  Aspiration pneumonia (HCC)- (present on admission)  Assessment & Plan  Suspect acute vs chronic aspiration in the setting of vomiting.  Trial unasyn with benadryl due to allergy.    Hyponatremia  Assessment & Plan  Mild.  Monitor.    Acute renal failure (ARF) (HCC)- (present on admission)  Assessment & Plan  Improving s/p IV fluid challenge. Anticipate prerenal.  Avoid nephrotoxins.  Monitor.    Secondary malignant neoplasm of liver (HCC)- (present on admission)  Assessment & Plan  Appreciate oncology assistance.    Anemia- (present on admission)  Assessment & Plan  Likely secondary to malignancy, follow-up anemia labs    Malignant neoplasm of rectum (HCC)- (present on admission)  Assessment & Plan  Appreciate oncology assistance. Hold chemo for now.       VTE prophylaxis: SCDs/TEDs and therapeutic anticoagulation with rivaroxaban    I have performed a physical exam and reviewed and updated ROS and Plan today (7/21/2021). In review of yesterday's note (7/20/2021), there are no changes except as documented above.

## 2021-07-21 NOTE — TELEPHONE ENCOUNTER
[facility] ph. [phone] , m requesting allergy confirmation for patient .     Per pharmacist pt is allergic to Codeine and Hydrocod Polst-CPM Polst ER (TUSSIONEX PENNKINETIC ER) 10-8 MG/5ML Suspension Extended Release was recently prescribed . Pharmacy would like to notify provider of allergy .     Please advise

## 2021-07-21 NOTE — THERAPY
Speech Language Pathology   Clinical Swallow Evaluation     Patient Name: Karlene Walters  AGE:  81 y.o., SEX:  female  Medical Record #: [id number]  Today's Date: 7/21/2021     Precautions  Precautions: Swallow Precautions ( See Comments)    Assessment    Patient is 82 yo female with PMHx significant for colorectal cancer with liver mets with multiple chemotherapy regimens tried in the past who presented with persistent cough. Hx of pulmonary embolism. Imaging concerning for COPD with flattened hemidiaphragms and hazy, reticular opacities on the CXR. Concerns for gross aspiration.     The patient was seen on this date for a clinical swallow evaluation. The patient completed an oral mechanism exam which was unremarkable. The patient was provided PO trials of mildly thick liquids, thin liquids, liquidized, soft and bite sized and regular textures. The patient consumed all trials with no overt s/sx of aspiration. Swallow trigger was complete and timely, patient denied globus sensation and odynophagia. Speculate aspiration pneumonia d/t intractable vomiting instead of dysphagia. Will plan to watch with meal x1 to ensure tolerance of diet. Will consider diagnostic if there are continued concerns for ascending aspiration. At this time, recommend upgrade to regular texture diet with thin liquids.    Plan    Recommend Speech Therapy 1 time per week until therapy goals are met for the following treatments:  Dysphagia Training.    Discharge Recommendations: Anticipate that the patient will have no further speech therapy needs after discharge from the hospital       Objective       07/21/21 1515   Oral Motor Eval    Is Patient Able to Complete Oral Motor Eval Yes, Within Normal Limits   Laryngeal Function   Voice Quality Within Functional Limits   Volutional Cough Within Functional Limits   Excursion Upon Swallow Complete   Oral Food Presentation   Ice Chips Within Functional Limits   Single Swallow Mildly Thick (2) - (Nectar  Thick)  Within Functional Limits   Serial Swallow Mildly Thick (2) - (Nectar Thick) Within Functional Limits   Single Swallow Thin (0) Within Functional Limits   Serial Swallow Thin (0) Within Functional Limits   Liquidised (3) Within Functional Limits   Minced & Moist (5) - (Dysphagia II) Within Functional Limits   Soft & Bite-Sized (6) - (Dysphagia III) Within Functional Limits   Regular (7) Within Functional Limits   Self Feeding Independent   Tracheostomy   Tracheostomy  No   Dysphagia Strategies / Recommendations   Strategies / Interventions Recommended (Yes / No) Yes   Compensatory Strategies None   Diet / Liquid Recommendation Thin (0);Regular (7)   Medication Administration  Whole with Liquid Wash   Therapy Interventions No Swallowing Intervention Required   Dysphagia Rating   Nutritional Liquid Intake Rating Scale Non thickened beverages   Nutritional Food Intake Rating Scale Total oral diet with no restrictions

## 2021-07-22 LAB
ALBUMIN SERPL BCP-MCNC: 3.2 G/DL (ref 3.2–4.9)
ALBUMIN/GLOB SERPL: 0.9 G/DL
ALP SERPL-CCNC: 191 U/L (ref 30–99)
ALT SERPL-CCNC: 13 U/L (ref 2–50)
ANION GAP SERPL CALC-SCNC: 12 MMOL/L (ref 7–16)
AST SERPL-CCNC: 17 U/L (ref 12–45)
BASOPHILS # BLD AUTO: 0.9 % (ref 0–1.8)
BASOPHILS # BLD: 0.07 K/UL (ref 0–0.12)
BILIRUB SERPL-MCNC: 0.5 MG/DL (ref 0.1–1.5)
BUN SERPL-MCNC: 31 MG/DL (ref 8–22)
CALCIUM SERPL-MCNC: 9.4 MG/DL (ref 8.5–10.5)
CHLORIDE SERPL-SCNC: 99 MMOL/L (ref 96–112)
CO2 SERPL-SCNC: 20 MMOL/L (ref 20–33)
CREAT SERPL-MCNC: 1.01 MG/DL (ref 0.5–1.4)
EOSINOPHIL # BLD AUTO: 0.11 K/UL (ref 0–0.51)
EOSINOPHIL NFR BLD: 1.5 % (ref 0–6.9)
ERYTHROCYTE [DISTWIDTH] IN BLOOD BY AUTOMATED COUNT: 59.2 FL (ref 35.9–50)
FERRITIN SERPL-MCNC: 177 NG/ML (ref 10–291)
GLOBULIN SER CALC-MCNC: 3.5 G/DL (ref 1.9–3.5)
GLUCOSE SERPL-MCNC: 97 MG/DL (ref 65–99)
HCT VFR BLD AUTO: 34.4 % (ref 37–47)
HGB BLD-MCNC: 10.7 G/DL (ref 12–16)
IMM GRANULOCYTES # BLD AUTO: 0.21 K/UL (ref 0–0.11)
IMM GRANULOCYTES NFR BLD AUTO: 2.8 % (ref 0–0.9)
IRON SATN MFR SERPL: 6 % (ref 15–55)
IRON SERPL-MCNC: 17 UG/DL (ref 40–170)
LYMPHOCYTES # BLD AUTO: 1.01 K/UL (ref 1–4.8)
LYMPHOCYTES NFR BLD: 13.5 % (ref 22–41)
MCH RBC QN AUTO: 26 PG (ref 27–33)
MCHC RBC AUTO-ENTMCNC: 31.1 G/DL (ref 33.6–35)
MCV RBC AUTO: 83.7 FL (ref 81.4–97.8)
MONOCYTES # BLD AUTO: 0.86 K/UL (ref 0–0.85)
MONOCYTES NFR BLD AUTO: 11.5 % (ref 0–13.4)
NEUTROPHILS # BLD AUTO: 5.23 K/UL (ref 2–7.15)
NEUTROPHILS NFR BLD: 69.8 % (ref 44–72)
NRBC # BLD AUTO: 0 K/UL
NRBC BLD-RTO: 0 /100 WBC
PLATELET # BLD AUTO: 542 K/UL (ref 164–446)
PMV BLD AUTO: 9.3 FL (ref 9–12.9)
POTASSIUM SERPL-SCNC: 4.2 MMOL/L (ref 3.6–5.5)
PROT SERPL-MCNC: 6.7 G/DL (ref 6–8.2)
RBC # BLD AUTO: 4.11 M/UL (ref 4.2–5.4)
SODIUM SERPL-SCNC: 131 MMOL/L (ref 135–145)
TIBC SERPL-MCNC: 283 UG/DL (ref 250–450)
UIBC SERPL-MCNC: 266 UG/DL (ref 110–370)
WBC # BLD AUTO: 7.5 K/UL (ref 4.8–10.8)

## 2021-07-22 PROCEDURE — 99232 SBSQ HOSP IP/OBS MODERATE 35: CPT | Performed by: NURSE PRACTITIONER

## 2021-07-22 PROCEDURE — A9270 NON-COVERED ITEM OR SERVICE: HCPCS | Performed by: INTERNAL MEDICINE

## 2021-07-22 PROCEDURE — 83550 IRON BINDING TEST: CPT

## 2021-07-22 PROCEDURE — 700102 HCHG RX REV CODE 250 W/ 637 OVERRIDE(OP): Performed by: INTERNAL MEDICINE

## 2021-07-22 PROCEDURE — A9270 NON-COVERED ITEM OR SERVICE: HCPCS | Performed by: STUDENT IN AN ORGANIZED HEALTH CARE EDUCATION/TRAINING PROGRAM

## 2021-07-22 PROCEDURE — 99232 SBSQ HOSP IP/OBS MODERATE 35: CPT | Performed by: STUDENT IN AN ORGANIZED HEALTH CARE EDUCATION/TRAINING PROGRAM

## 2021-07-22 PROCEDURE — 700102 HCHG RX REV CODE 250 W/ 637 OVERRIDE(OP): Performed by: STUDENT IN AN ORGANIZED HEALTH CARE EDUCATION/TRAINING PROGRAM

## 2021-07-22 PROCEDURE — 99233 SBSQ HOSP IP/OBS HIGH 50: CPT | Mod: GC | Performed by: INTERNAL MEDICINE

## 2021-07-22 PROCEDURE — 770004 HCHG ROOM/CARE - ONCOLOGY PRIVATE *

## 2021-07-22 PROCEDURE — 94760 N-INVAS EAR/PLS OXIMETRY 1: CPT

## 2021-07-22 PROCEDURE — 700111 HCHG RX REV CODE 636 W/ 250 OVERRIDE (IP): Performed by: INTERNAL MEDICINE

## 2021-07-22 PROCEDURE — 83540 ASSAY OF IRON: CPT

## 2021-07-22 PROCEDURE — 700105 HCHG RX REV CODE 258: Performed by: INTERNAL MEDICINE

## 2021-07-22 PROCEDURE — 85025 COMPLETE CBC W/AUTO DIFF WBC: CPT

## 2021-07-22 PROCEDURE — 82728 ASSAY OF FERRITIN: CPT

## 2021-07-22 PROCEDURE — 80053 COMPREHEN METABOLIC PANEL: CPT

## 2021-07-22 RX ADMIN — GUAIFENESIN 600 MG: 600 TABLET, EXTENDED RELEASE ORAL at 05:24

## 2021-07-22 RX ADMIN — SODIUM CHLORIDE 3 G: 900 INJECTION INTRAVENOUS at 00:32

## 2021-07-22 RX ADMIN — SODIUM CHLORIDE 3 G: 900 INJECTION INTRAVENOUS at 05:23

## 2021-07-22 RX ADMIN — OMEPRAZOLE 20 MG: 20 CAPSULE, DELAYED RELEASE ORAL at 18:59

## 2021-07-22 RX ADMIN — SODIUM CHLORIDE 3 G: 900 INJECTION INTRAVENOUS at 18:59

## 2021-07-22 RX ADMIN — GUAIFENESIN 600 MG: 600 TABLET, EXTENDED RELEASE ORAL at 18:59

## 2021-07-22 RX ADMIN — RIVAROXABAN 10 MG: 10 TABLET, FILM COATED ORAL at 00:32

## 2021-07-22 RX ADMIN — SODIUM CHLORIDE 3 G: 900 INJECTION INTRAVENOUS at 11:40

## 2021-07-22 RX ADMIN — OMEPRAZOLE 20 MG: 20 CAPSULE, DELAYED RELEASE ORAL at 05:24

## 2021-07-22 ASSESSMENT — ENCOUNTER SYMPTOMS
SHORTNESS OF BREATH: 1
FEVER: 0
ABDOMINAL PAIN: 1
PND: 0
COUGH: 1
PALPITATIONS: 0
SPUTUM PRODUCTION: 1
SPEECH CHANGE: 0
WHEEZING: 0
NAUSEA: 0
NAUSEA: 1
VOMITING: 0
SHORTNESS OF BREATH: 0
WEAKNESS: 1
HEMOPTYSIS: 0
SENSORY CHANGE: 0
CHILLS: 0
MEMORY LOSS: 0
FALLS: 0
INSOMNIA: 0
DIAPHORESIS: 0
HEADACHES: 0
FOCAL WEAKNESS: 0
HEARTBURN: 1
SORE THROAT: 1
VOMITING: 1
SINUS PAIN: 0

## 2021-07-22 ASSESSMENT — PAIN DESCRIPTION - PAIN TYPE: TYPE: ACUTE PAIN

## 2021-07-22 NOTE — PROGRESS NOTES
[facility] Medicine Daily Progress Note    Date of Service  7/22/2021    Chief Complaint  Karlene Walters is a 81 y.o. female admitted 7/19/2021 with shortness of breath and weakness.    Hospital Course  Karlene Walters is a 81 y.o. female who presented 7/19/2021 to the Emergency Department for evaluation of generalized weakness. She has colorectal cancer with metastasis to the liver and is on chemo every other week. She states that she has had a cough since June 1st, which has been worsening. The cough is productive, with green, yellow, and black sputum. Her throat is sore secondary to the cough. She is supposed to have sputum testing done tomorrow. Additionally she has shortness of breath and loss of appetite. She has not had any fevers, abdominal pain, or leg swelling. She has been battling cancer for the last ten years, and Dr. Steele is her oncologist. She has had multiple hepatic ablation, and her last PET scan was several months ago. She is on Xarelto.       Interval Problem Update  No acute events overnight.  Patient states she is feeling better today.  She still has greenish mucous production but less so than yesterday.  SLP evaluation: no signs of aspiration, okay for regular diet with thin liquids. Monitor for aspiration, aspiration precautions.  On unasyn for likely aspiration pneumonia.  Pulmonology following.  On xarelto for hx of pulmonary embolism.  PT/OT evaluation.  Anticipate discharge home soon pending pulmonology recommendations.    Consultants/Specialty  oncology and pulmonary    Code Status  Full Code    Disposition  Patient is not medically cleared.  Anticipate discharge to to home with close outpatient follow-up.  I have placed the appropriate orders for post-discharge needs.    Review of Systems  Review of Systems   Constitutional: Positive for malaise/fatigue.   Respiratory: Positive for cough and sputum production.    Gastrointestinal: Positive for nausea and vomiting.   All other  systems reviewed and are negative.       Physical Exam  Temp:  [36.5 °C (97.7 °F)-37.2 °C (98.9 °F)] 36.7 °C (98 °F)  Pulse:  [103-115] 104  Resp:  [16-18] 16  BP: (119-137)/(56-73) 119/64  SpO2:  [91 %-98 %] 91 %    Physical Exam  Vitals and nursing note reviewed.   Constitutional:       General: She is not in acute distress.  HENT:      Head: Normocephalic and atraumatic. Hair is abnormal (absent).      Right Ear: External ear normal.      Left Ear: External ear normal.      Nose: Nose normal.      Mouth/Throat:      Mouth: Mucous membranes are moist.      Pharynx: Oropharynx is clear.   Eyes:      General: No scleral icterus.     Conjunctiva/sclera: Conjunctivae normal.   Cardiovascular:      Rate and Rhythm: Normal rate and regular rhythm.   Pulmonary:      Effort: Pulmonary effort is normal.      Breath sounds: Normal breath sounds.   Chest:      Comments: Port in place.  Abdominal:      Palpations: Abdomen is soft.      Tenderness: There is no abdominal tenderness. There is no guarding.   Musculoskeletal:         General: No swelling or deformity.      Cervical back: Normal range of motion and neck supple.   Skin:     General: Skin is warm and dry.   Neurological:      General: No focal deficit present.      Mental Status: She is alert and oriented to person, place, and time.   Psychiatric:         Mood and Affect: Mood normal.         Behavior: Behavior normal.         Fluids    Intake/Output Summary (Last 24 hours) at 7/22/2021 1214  Last data filed at 7/22/2021 0940  Gross per 24 hour   Intake --   Output 650 ml   Net -650 ml       Laboratory  Recent Labs     07/19/21  2200 07/20/21  0415 07/22/21  0040   WBC 8.6 6.6 7.5   RBC 4.42 3.66* 4.11*   HEMOGLOBIN 11.4* 9.5* 10.7*   HEMATOCRIT 36.3* 30.5* 34.4*   MCV 82.1 83.3 83.7   MCH 25.8* 26.0* 26.0*   MCHC 31.4* 31.1* 31.1*   RDW 57.1* 57.8* 59.2*   PLATELETCT 533* 394 542*   MPV 9.7 9.3 9.3     Recent Labs     07/19/21  2200 07/20/21  0415 07/22/21  0040    SODIUM 129* 132* 131*   POTASSIUM 5.0 4.5 4.2   CHLORIDE 95* 102 99   CO2 17* 17* 20   GLUCOSE 107* 95 97   BUN 39* 31* 31*   CREATININE 1.63* 1.18 1.01   CALCIUM 9.9 8.5 9.4                   Imaging  CT-CHEST (THORAX) W/O   Final Result         1.  Linear nodular infiltrates in the bilateral lung bases. Minimal scattered patchy groundglass pulmonary infiltrates.   2.  No acute thoracic abnormality identified.   3.  Atherosclerosis and atherosclerotic coronary artery disease      Fleischner Society pulmonary nodule recommendations:   Not Applicable         DX-CHEST-PORTABLE (1 VIEW)   Final Result         1.  Hazy right lung base opacities, concerning for infiltrates           Assessment/Plan  Aspiration pneumonia (HCC)- (present on admission)  Assessment & Plan  Suspect acute vs chronic aspiration in the setting of vomiting.  Trial unasyn with benadryl due to allergy.  Pulmonology following    Hyponatremia  Assessment & Plan  Mild.  Monitor.    Acute renal failure (ARF) (HCC)- (present on admission)  Assessment & Plan  Resolved   s/p IV fluid challenge. Anticipate prerenal.  Avoid nephrotoxins.  Monitor.    History of pulmonary embolus (PE)- (present on admission)  Assessment & Plan  On xarelto    Secondary malignant neoplasm of liver (HCC)- (present on admission)  Assessment & Plan  Appreciate oncology assistance.    Anemia- (present on admission)  Assessment & Plan  Likely secondary to malignancy, follow-up anemia labs    Malignant neoplasm of rectum (HCC)- (present on admission)  Assessment & Plan  Appreciate oncology assistance. Hold chemo for now.       VTE prophylaxis: SCDs/TEDs and therapeutic anticoagulation with rivaroxaban    I have performed a physical exam and reviewed and updated ROS and Plan today (7/22/2021). In review of yesterday's note (7/21/2021), there are no changes except as documented above.

## 2021-07-22 NOTE — CARE PLAN
The patient is Watcher - Medium risk of patient condition declining or worsening    Shift Goals  Clinical Goals: comfort  Patient Goals: rest    Progress made toward(s) clinical / shift goals:    Problem: Knowledge Deficit - Standard  Goal: Patient and family/care givers will demonstrate understanding of plan of care, disease process/condition, diagnostic tests and medications  Outcome: Progressing     Problem: Fall Risk  Goal: Patient will remain free from falls  Outcome: Progressing       Patient is not progressing towards the following goals:

## 2021-07-22 NOTE — PROGRESS NOTES
4 Eyes Skin Assessment Completed by Asha, AZEB and AZEB Conte.    Head WDL  Ears WDL  Nose WDL  Mouth WDL  Neck WDL  Breast/Chest WDL  Shoulder Blades WDL  Spine WDL  (R) Arm/Elbow/Hand WDL  (L) Arm/Elbow/Hand WDL  Abdomen WDL  Groin WDL  Scrotum/Coccyx/Buttocks WDL  (R) Leg WDL  (L) Leg WDL  (R) Heel/Foot/Toe WDL  (L) Heel/Foot/Toe WDL          Devices In Places  None      Interventions In Place Waffle Overlay and Pressure Redistribution Mattress    Possible Skin Injury No    Pictures Uploaded Into Epic N/A  Wound Consult Placed N/A  RN Wound Prevention Protocol Ordered No

## 2021-07-22 NOTE — ASSESSMENT & PLAN NOTE
Suspect acute vs chronic aspiration in the setting of vomiting.  Trial unasyn with benadryl due to allergy.  Pulmonology following

## 2021-07-22 NOTE — DOCUMENTATION QUERY
[facility]                                                                       Query Response Note      PATIENT:               KANG HALL  ACCT #:                  [id number]  MRN:                     [id number]  :                      1940  ADMIT DATE:       2021 8:21 PM  DISCH DATE:          RESPONDING  PROVIDER #:        [id number]           QUERY TEXT:    Severe malnutrition is documented in the Registered Dietician Barbara.      Please specify if you:    NOTE:  If an appropriate response is not listed below, please respond with a new note.      The patient's Clinical Indicators include:  Per  Dietary Note: severe chronic disease related malnutrition r/t metastatic disease, evidenced by severe fat loss, severe muscle loss, severely reduced energy intake per pt report < 50% for > 1 month, and severe weight loss of 13% in 3 months  Risk Factors: severe fat / muscle loss, decreased PO intake, weight loss, malignancy, chemo  Treatment:  advance diet as medically feasible, encourage/ document PO intake, monitor weight    Thank You,  Marissa Villafuerte RN, BSN  Clinical    Connect via [employer]  Options provided:   -- Agree with dietician assessment of severe malnutrition   -- Disagree with dietician assessment of severe malnutrition   -- Unable to determine      Query created by: Marissa Villafuerte on 2021 12:21 PM    RESPONSE TEXT:    Agree with dietician assessment of severe malnutrition          Electronically signed by:  JOHNNY KOROMA MD 2021 2:20 PM

## 2021-07-22 NOTE — CARE PLAN
The patient is Stable - Low risk of patient condition declining or worsening    Shift Goals  Clinical Goals: work on IS, safety  Patient Goals: Up to chair for meals    Progress made toward(s) clinical / shift goals:    Problem: Mobility  Goal: Patient's capacity to carry out activities will improve  Note: Goal set with pt this morning to work on ambulation. Up to chair for meals. Pt is able to ambulate with one assist and FWW, weak but has steady gait.      Problem: Infection - Standard  Goal: Patient will remain free from infection  Note: Pt afebrile, port with biopatch, dressing CDI, IV ABX administered per MAR.      Problem: Respiratory  Goal: Patient will achieve/maintain optimum respiratory ventilation and gas exchange  Outcome: Progressing  Flowsheets  Taken 7/22/2021 1439  O2 Delivery Device: None - Room Air  Taken 7/22/2021 1240  Incentive Spirometer: (pt hesitant to perform r/t coughing fit that ensues. ) Strong Effort  Note: Pt satting well on RA. IS provided, educated pt. Pt able to demonstrate correct use, was able to pull 750. It does throw pt into a coughing fit with use, so pt hesitant to use frequently. Lungs clear/dim from this RN assessment.        Patient is not progressing towards the following goals:

## 2021-07-22 NOTE — PROGRESS NOTES
Pulmonary Consultation Note    Patient ID:   Name:             Karlene Walters   YOB: 1940  Age:                 81 y.o.  female   MRN:               [id number]  Referring Provider: Dr. Leslye Fletcher                                                  History of Present Illness:     Patient is an 82 y/o F with PMH significant for colorectal cancer w/ mets to liver (dx 2012, s/p multiple chemo regimens, currently on Irinotecan+Avastin, last cycle 2 weeks ago), pulmonary embolism (on Xarelto), restless legs syndrome, HTN/HLD who presented on 7/19 for generalized weakness. Since March 2021 she has had progressive worsening shortness of breath with productive cough containing greenish-yellowish sputum. Her cough was originally attributed as a side effect of 5-FU chemo but it continued even after that chemo regimen was discontinued. Prior to hospitalization, Asha Malone from Pulmonary was consulted and ordered sputum culture, prednisone taper, levofloxacin, and robitussin; however, patient was admitted to the hospital before these could be performed. Patient was admitted to the hospital for profound weakness, shortness of breath, cough and dehydration. Pulmonary has been consulted to further evaluate this shortness of breath.    Regarding cough: Patient states the cough is so bad sometimes she cannot sleep at night. Concomitant symptoms include shortness of breath and sore throat without hemoptysis, fever or chills. She does not have a history of lung disease and she does not smoke, use illicit drugs, or drink alcohol. She previously went to a doctor who attributed her symptoms to GERD and prescribed her omeprazole which was later changed to pantoprazole; pantoprazole did help her symptoms somewhat but the cough has still continued.    Subjective: Patient's cough has improved today. She states she generally feels better; she is coughing up less green bile but instead more phlegm. She was able to sleep  well; she feels she can also breathe deeply more easily.    Review of Systems: Review of Systems   Constitutional: Positive for malaise/fatigue. Negative for chills, diaphoresis and fever.   HENT: Positive for sore throat. Negative for sinus pain.    Respiratory: Positive for cough and sputum production. Negative for hemoptysis, shortness of breath and wheezing.    Cardiovascular: Negative for chest pain, palpitations, leg swelling and PND.   Gastrointestinal: Positive for abdominal pain and heartburn. Negative for nausea and vomiting.        Vomiting green bile   Musculoskeletal: Negative for falls.   Neurological: Positive for weakness. Negative for sensory change, speech change, focal weakness and headaches.   Psychiatric/Behavioral: Negative for memory loss. The patient does not have insomnia.         Insomnia improved from 7/21       Past Medical History:   Past Medical History:   Diagnosis Date   • Adverse effect of anesthesia     elevated BP postop   • Anesthesia    • Arrhythmia     occasional   • Blood clotting disorder (HCC) 08/2015    bilat PE    • Blood transfusion 1957   • Breath shortness     pt reports d/t chemo treatment; no O2; with moderate exertion; no c/o at this time   • Cancer (HCC) 09/2012    rectal cancer,  Liver CA-2015   • CATARACT     removed b/l   • Chemotherapy-induced neutropenia (HCC) 9/28/2016   • Chickenpox     history of   • Colon cancer ([facility])    • Dental disorder     dentures UPPERS AND LOWERS   • Elevated alkaline phosphatase level 11/15/2017   • Emphysema of lung ([facility])     COPD   • Fall    • [place] measles     history of   • Heart murmur     as a child   • Hemorrhagic disorder (HCC)     on Xarelto    • High cholesterol    • Hypercholesteremia    • Hypertension     no meds currently    • Ileostomy in place ([facility])    • Ileostomy in place ([facility])    • Indigestion    • Influenza     history of   • Lightheadedness 9/17/2015   • Mumps     history of   • Obstruction     ileostomy   • Other  "specified symptom associated with female genital organs     HYSTERECTOMY 1993   • Pneumonia 05/2017    tx'd with antibx   • Pulmonary embolism (HCC)    • 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)    • Renal insufficiency 3/14/2016   • Restless legs 5/6/2020   • Routine general medical examination at a health care facility 3/14/2016    3/14/16   • Seasonal allergies    • Swelling of both ankles     Lasix PRN   • Tonsillitis      Objective:   Vitals/ General Appearance:   Weight/BMI: Body mass index is 22.96 kg/m².  /64   Pulse (!) 104   Temp 36.7 °C (98 °F) (Temporal)   Resp 16   Ht 1.651 m (5' 5\")   Wt 62.6 kg (138 lb)   SpO2 91%   Vitals:    07/21/21 2200 07/22/21 0500 07/22/21 0819 07/22/21 1045   BP: 127/69 123/56 119/64    Pulse: (!) 115 (!) 109 (!) 103 (!) 104   Resp: 18 18 16 16   Temp: 37.2 °C (98.9 °F) 36.9 °C (98.4 °F) 36.7 °C (98 °F)    TempSrc: Temporal Temporal Temporal    SpO2: 96% 98% 93% 91%   Weight:       Height:         Oxygen Therapy:  Pulse Oximetry: 91 %, O2 (LPM): 0, O2 Delivery Device: None - Room Air    Constitutional:   Ill-appearing, thin, No acute distress.  Vomit bag with small amount of green bile liquid and phlegm at bedside.  HENT:  Normocephalic, Atraumatic, Oropharynx moist mucous membranes, Alopecia present. Not visibly coughing.  Eyes:  EOMI, Conjunctiva normal, No discharge.  Neck:  Normal range of motion  Cardiovascular:  Normal heart rate, Normal rhythm, Extremities with intact distal pulses, no cyanosis, or edema.  Lungs:  Wheeze in RLL, diminished breath sound in LLL, clear in all other lobes b/l.   Abdomen: Bowel sounds normal, Soft, No tenderness, No guarding  Skin: Warm, Dry, No erythema, No rash, no induration.  Neurologic: Alert & oriented x 4, No focal deficits noted, cranial nerves II through X are grossly intact.  Psychiatric: Affect normal, Judgment normal, Mood normal.    Labs:  Recent Labs     07/19/21 2200 07/20/21  0415 07/22/21  0040   WBC 8.6 " 6.6 7.5   RBC 4.42 3.66* 4.11*   HEMOGLOBIN 11.4* 9.5* 10.7*   HEMATOCRIT 36.3* 30.5* 34.4*   MCV 82.1 83.3 83.7   MCH 25.8* 26.0* 26.0*   MCHC 31.4* 31.1* 31.1*   RDW 57.1* 57.8* 59.2*   PLATELETCT 533* 394 542*   MPV 9.7 9.3 9.3                 Recent Labs     07/19/21 2200 07/20/21 0415 07/22/21  0040   SODIUM 129* 132* 131*   POTASSIUM 5.0 4.5 4.2   CHLORIDE 95* 102 99   CO2 17* 17* 20   GLUCOSE 107* 95 97   BUN 39* 31* 31*     Recent Labs     07/19/21 2200 07/20/21 0415 07/22/21  0040   SODIUM 129* 132* 131*   POTASSIUM 5.0 4.5 4.2   CHLORIDE 95* 102 99   CO2 17* 17* 20   BUN 39* 31* 31*   CREATININE 1.63* 1.18 1.01   MAGNESIUM 2.0  --   --    CALCIUM 9.9 8.5 9.4     Results for orders placed or performed during the hospital encounter of 10/06/17   ECHOCARDIOGRAM COMP W/O CONT   Result Value Ref Range    Eject.Frac. MOD BP 56.87     Eject.Frac. MOD 4C 53.7     Eject.Frac. MOD 2C 60.84     Left Ventrical Ejection Fraction 60          Imaging:   CT-CHEST (THORAX) W/O   Final Result         1.  Linear nodular infiltrates in the bilateral lung bases. Minimal scattered patchy groundglass pulmonary infiltrates.   2.  No acute thoracic abnormality identified.   3.  Atherosclerosis and atherosclerotic coronary artery disease      Fleischner Society pulmonary nodule recommendations:   Not Applicable         DX-CHEST-PORTABLE (1 VIEW)   Final Result         1.  Hazy right lung base opacities, concerning for infiltrates          Hospital Medications:    Current Facility-Administered Medications:   •  prochlorperazine (COMPAZINE) injection 10 mg, 10 mg, Intravenous, Q6HRS PRN, Efraín Corona M.D., 10 mg at 07/21/21 1708  •  ampicillin/sulbactam (UNASYN) 3 g in  mL IVPB, 3 g, Intravenous, Q6HRS, Leslye Fletcher M.D., Stopped at 07/22/21 1210  •  diphenhydrAMINE (BENADRYL) injection 25 mg, 25 mg, Intravenous, Q6HRS PRN, Leslye Fletcher M.D.  •  omeprazole (PRILOSEC) capsule 20 mg, 20 mg, Oral, BID, Leslye PERES  "MARIANA Fletcher, 20 mg at 07/22/21 0524  •  umeclidinium-vilanterol (ANORO ELLIPTA) inhaler 1 Puff, 1 Puff, Inhalation, QDAILY (RT), Olman Dwyer M.D.  •  ondansetron (ZOFRAN) syringe/vial injection 4 mg, 4 mg, Intravenous, Q4HRS PRN, Leslye Fletcher M.D., 4 mg at 07/21/21 0329  •  guaiFENesin ER (MUCINEX) tablet 600 mg, 600 mg, Oral, Q12HRS, Yamileth Colon M.D., 600 mg at 07/22/21 0524  •  menthol (HALLS) lozenge 1 Lozenge, 1 Lozenge, Oral, Q2HRS PRN, Yamileth Colon M.D.  •  albuterol (PROVENTIL) 2.5 mg/0.5 mL nebulizer solution 2.5 mg, 2.5 mg, Nebulization, Q4H PRN (RT), Olman Dwyer M.D.  •  rivaroxaban (XARELTO) tablet 10 mg, 10 mg, Oral, QDAY, Olman Dwyer M.D., 10 mg at 07/22/21 0032  •  senna-docusate (PERICOLACE or SENOKOT S) 8.6-50 MG per tablet 2 tablet, 2 tablet, Oral, BID **AND** polyethylene glycol/lytes (MIRALAX) PACKET 1 Packet, 1 Packet, Oral, QDAY PRN **AND** magnesium hydroxide (MILK OF MAGNESIA) suspension 30 mL, 30 mL, Oral, QDAY PRN **AND** bisacodyl (DULCOLAX) suppository 10 mg, 10 mg, Rectal, QDAY PRN, Olman Dwyer M.D.  •  NS infusion 2,000 mL, 2,000 mL, Intravenous, Continuous, Olman Dwyer M.D., Last Rate: 150 mL/hr at 07/21/21 2119, 2,000 mL at 07/21/21 2119  •  acetaminophen (Tylenol) tablet 650 mg, 650 mg, Oral, Q6HRS PRN, Olman Dwyer M.D., 650 mg at 07/20/21 2248  •  labetalol (NORMODYNE/TRANDATE) injection 10 mg, 10 mg, Intravenous, Q4HRS PRN, Olman Dwyer M.D.    Medication Allergy:  Allergies   Allergen Reactions   • Oxaliplatin Anaphylaxis   • Codeine      \"gets drunk\"   • Pcn [Penicillins] Itching     itching   • Sulfa Drugs Itching     itching   • Tape Rash     PAPER TAPE OK       Assessment and Plan:  Active Problems:    Malignant neoplasm of rectum (HCC) (Chronic) POA: Yes    Anemia POA: Yes    Secondary malignant neoplasm of liver (HCC) (Chronic) POA: Yes    Dehydration POA: Yes    History of pulmonary embolus (PE) POA: Yes    Shortness of breath POA: Yes    " Acute renal failure (ARF) (HCC) POA: Yes    Hyponatremia POA: Unknown    Aspiration pneumonia (HCC) POA: Yes  Resolved Problems:    * No resolved hospital problems. *    80 y/o F with PMH significant for colorectal cancer w/ mets to liver (dx 2012, s/p multiple chemo regimens, currently on Irinotecan+Avastin, last cycle 2 weeks ago), pulmonary embolism (on Xarelto), restless legs syndrome, HTN/HLD who presented on 7/19 for generalized weakness. Pulmonary has been consulted for persistent productive cough lasting 3+ months. Per our reading of imaging: hyperinflated lung fields, indicative of severe emphysematous disease, COPD.    Patient's symptoms have now improved. Formal SLP eval was performed and recommended advancing diet.    #Aspiration pneumonia  #Metastatic colorectal cancer  #S/p chemotherapy  #Pulmonary Hypertension (mild)    -Continue guaifenesin and cough drops for expectoration and symptomatic relief  -Continue antiemetics  -Continue Unasyn, can switch to PO abx pending clinical improvement  -Attempt to reobtain noncontaminated sputum cultures if intractable vomiting improves  -COVID/flu test  -Patient likely needs outpatient follow-up with pulmonology in 6 weeks with repeat CT noncontrast chest    Discussed with Dr. Johnson from primary team. Thank you for this consult.    Yamileth Colon MD  PGY2

## 2021-07-23 ENCOUNTER — PHARMACY VISIT (OUTPATIENT)
Dept: PHARMACY | Facility: MEDICAL CENTER | Age: 81
End: 2021-07-23
Payer: COMMERCIAL

## 2021-07-23 VITALS
RESPIRATION RATE: 16 BRPM | SYSTOLIC BLOOD PRESSURE: 125 MMHG | HEART RATE: 89 BPM | DIASTOLIC BLOOD PRESSURE: 65 MMHG | OXYGEN SATURATION: 96 % | BODY MASS INDEX: 22.99 KG/M2 | TEMPERATURE: 97.5 F | WEIGHT: 138 LBS | HEIGHT: 65 IN

## 2021-07-23 LAB
ALBUMIN SERPL BCP-MCNC: 2.5 G/DL (ref 3.2–4.9)
ALBUMIN/GLOB SERPL: 0.8 G/DL
ALP SERPL-CCNC: 166 U/L (ref 30–99)
ALT SERPL-CCNC: 11 U/L (ref 2–50)
ANION GAP SERPL CALC-SCNC: 11 MMOL/L (ref 7–16)
AST SERPL-CCNC: 17 U/L (ref 12–45)
BASOPHILS # BLD AUTO: 0.8 % (ref 0–1.8)
BASOPHILS # BLD: 0.05 K/UL (ref 0–0.12)
BILIRUB SERPL-MCNC: 0.3 MG/DL (ref 0.1–1.5)
BUN SERPL-MCNC: 26 MG/DL (ref 8–22)
CALCIUM SERPL-MCNC: 8 MG/DL (ref 8.5–10.5)
CHLORIDE SERPL-SCNC: 107 MMOL/L (ref 96–112)
CO2 SERPL-SCNC: 19 MMOL/L (ref 20–33)
CREAT SERPL-MCNC: 0.69 MG/DL (ref 0.5–1.4)
EOSINOPHIL # BLD AUTO: 0.18 K/UL (ref 0–0.51)
EOSINOPHIL NFR BLD: 2.8 % (ref 0–6.9)
ERYTHROCYTE [DISTWIDTH] IN BLOOD BY AUTOMATED COUNT: 60.9 FL (ref 35.9–50)
GLOBULIN SER CALC-MCNC: 3.2 G/DL (ref 1.9–3.5)
GLUCOSE SERPL-MCNC: 88 MG/DL (ref 65–99)
HCT VFR BLD AUTO: 27.8 % (ref 37–47)
HGB BLD-MCNC: 8.4 G/DL (ref 12–16)
IMM GRANULOCYTES # BLD AUTO: 0.23 K/UL (ref 0–0.11)
IMM GRANULOCYTES NFR BLD AUTO: 3.6 % (ref 0–0.9)
L PNEUMO AG UR QL IA: NEGATIVE
LYMPHOCYTES # BLD AUTO: 0.85 K/UL (ref 1–4.8)
LYMPHOCYTES NFR BLD: 13.2 % (ref 22–41)
MCH RBC QN AUTO: 25.9 PG (ref 27–33)
MCHC RBC AUTO-ENTMCNC: 30.2 G/DL (ref 33.6–35)
MCV RBC AUTO: 85.8 FL (ref 81.4–97.8)
MONOCYTES # BLD AUTO: 0.81 K/UL (ref 0–0.85)
MONOCYTES NFR BLD AUTO: 12.6 % (ref 0–13.4)
NEUTROPHILS # BLD AUTO: 4.31 K/UL (ref 2–7.15)
NEUTROPHILS NFR BLD: 67 % (ref 44–72)
NRBC # BLD AUTO: 0 K/UL
NRBC BLD-RTO: 0 /100 WBC
PLATELET # BLD AUTO: 402 K/UL (ref 164–446)
PMV BLD AUTO: 9.1 FL (ref 9–12.9)
POTASSIUM SERPL-SCNC: 3.4 MMOL/L (ref 3.6–5.5)
PROCALCITONIN SERPL-MCNC: 0.16 NG/ML
PROT SERPL-MCNC: 5.7 G/DL (ref 6–8.2)
RBC # BLD AUTO: 3.24 M/UL (ref 4.2–5.4)
SODIUM SERPL-SCNC: 137 MMOL/L (ref 135–145)
WBC # BLD AUTO: 6.4 K/UL (ref 4.8–10.8)

## 2021-07-23 PROCEDURE — 94760 N-INVAS EAR/PLS OXIMETRY 1: CPT

## 2021-07-23 PROCEDURE — 85025 COMPLETE CBC W/AUTO DIFF WBC: CPT

## 2021-07-23 PROCEDURE — 80053 COMPREHEN METABOLIC PANEL: CPT

## 2021-07-23 PROCEDURE — 700102 HCHG RX REV CODE 250 W/ 637 OVERRIDE(OP): Performed by: STUDENT IN AN ORGANIZED HEALTH CARE EDUCATION/TRAINING PROGRAM

## 2021-07-23 PROCEDURE — RXMED WILLOW AMBULATORY MEDICATION CHARGE: Performed by: STUDENT IN AN ORGANIZED HEALTH CARE EDUCATION/TRAINING PROGRAM

## 2021-07-23 PROCEDURE — A9270 NON-COVERED ITEM OR SERVICE: HCPCS | Performed by: STUDENT IN AN ORGANIZED HEALTH CARE EDUCATION/TRAINING PROGRAM

## 2021-07-23 PROCEDURE — 700105 HCHG RX REV CODE 258: Performed by: INTERNAL MEDICINE

## 2021-07-23 PROCEDURE — 700102 HCHG RX REV CODE 250 W/ 637 OVERRIDE(OP): Performed by: INTERNAL MEDICINE

## 2021-07-23 PROCEDURE — 97165 OT EVAL LOW COMPLEX 30 MIN: CPT

## 2021-07-23 PROCEDURE — 99233 SBSQ HOSP IP/OBS HIGH 50: CPT | Mod: GC | Performed by: INTERNAL MEDICINE

## 2021-07-23 PROCEDURE — 97161 PT EVAL LOW COMPLEX 20 MIN: CPT

## 2021-07-23 PROCEDURE — A9270 NON-COVERED ITEM OR SERVICE: HCPCS | Performed by: INTERNAL MEDICINE

## 2021-07-23 PROCEDURE — 700111 HCHG RX REV CODE 636 W/ 250 OVERRIDE (IP): Performed by: STUDENT IN AN ORGANIZED HEALTH CARE EDUCATION/TRAINING PROGRAM

## 2021-07-23 PROCEDURE — 99231 SBSQ HOSP IP/OBS SF/LOW 25: CPT | Performed by: INTERNAL MEDICINE

## 2021-07-23 PROCEDURE — 84145 PROCALCITONIN (PCT): CPT

## 2021-07-23 PROCEDURE — 700111 HCHG RX REV CODE 636 W/ 250 OVERRIDE (IP): Performed by: INTERNAL MEDICINE

## 2021-07-23 PROCEDURE — 99239 HOSP IP/OBS DSCHRG MGMT >30: CPT | Performed by: STUDENT IN AN ORGANIZED HEALTH CARE EDUCATION/TRAINING PROGRAM

## 2021-07-23 RX ORDER — AMOXICILLIN AND CLAVULANATE POTASSIUM 875; 125 MG/1; MG/1
1 TABLET, FILM COATED ORAL 2 TIMES DAILY
Qty: 6 TABLET | Refills: 0 | Status: SHIPPED | OUTPATIENT
Start: 2021-07-23 | End: 2021-07-23 | Stop reason: SDUPTHER

## 2021-07-23 RX ORDER — HEPARIN SODIUM (PORCINE) LOCK FLUSH IV SOLN 100 UNIT/ML 100 UNIT/ML
300-500 SOLUTION INTRAVENOUS PRN
Status: DISCONTINUED | OUTPATIENT
Start: 2021-07-23 | End: 2021-07-23 | Stop reason: HOSPADM

## 2021-07-23 RX ORDER — GUAIFENESIN 600 MG/1
600 TABLET, EXTENDED RELEASE ORAL EVERY 12 HOURS
Qty: 14 TABLET | Refills: 0 | Status: SHIPPED | OUTPATIENT
Start: 2021-07-23 | End: 2021-07-30

## 2021-07-23 RX ORDER — AMOXICILLIN AND CLAVULANATE POTASSIUM 875; 125 MG/1; MG/1
1 TABLET, FILM COATED ORAL 2 TIMES DAILY
Qty: 20 TABLET | Refills: 0 | Status: SHIPPED | OUTPATIENT
Start: 2021-07-23 | End: 2021-08-02

## 2021-07-23 RX ADMIN — SODIUM CHLORIDE 3 G: 900 INJECTION INTRAVENOUS at 11:48

## 2021-07-23 RX ADMIN — HEPARIN 500 UNITS: 100 SYRINGE at 16:10

## 2021-07-23 RX ADMIN — GUAIFENESIN 600 MG: 600 TABLET, EXTENDED RELEASE ORAL at 06:30

## 2021-07-23 RX ADMIN — SODIUM CHLORIDE 3 G: 900 INJECTION INTRAVENOUS at 00:28

## 2021-07-23 RX ADMIN — RIVAROXABAN 10 MG: 10 TABLET, FILM COATED ORAL at 00:28

## 2021-07-23 RX ADMIN — OMEPRAZOLE 20 MG: 20 CAPSULE, DELAYED RELEASE ORAL at 06:30

## 2021-07-23 RX ADMIN — SODIUM CHLORIDE 3 G: 900 INJECTION INTRAVENOUS at 06:30

## 2021-07-23 RX ADMIN — SODIUM CHLORIDE 2000 ML: 9 INJECTION, SOLUTION INTRAVENOUS at 06:30

## 2021-07-23 ASSESSMENT — ENCOUNTER SYMPTOMS
COUGH: 1
WHEEZING: 0
MEMORY LOSS: 0
WEAKNESS: 1
ABDOMINAL PAIN: 1
PND: 0
SHORTNESS OF BREATH: 0
FOCAL WEAKNESS: 0
FALLS: 0
SINUS PAIN: 0
FEVER: 0
VOMITING: 0
INSOMNIA: 0
SORE THROAT: 1
NAUSEA: 0
SPEECH CHANGE: 0
PALPITATIONS: 0
HEMOPTYSIS: 0
DIAPHORESIS: 0
SPUTUM PRODUCTION: 1
HEADACHES: 0
CHILLS: 0
SENSORY CHANGE: 0
HEARTBURN: 1

## 2021-07-23 ASSESSMENT — COGNITIVE AND FUNCTIONAL STATUS - GENERAL
HELP NEEDED FOR BATHING: A LITTLE
WALKING IN HOSPITAL ROOM: A LITTLE
SUGGESTED CMS G CODE MODIFIER MOBILITY: CJ
DAILY ACTIVITIY SCORE: 22
CLIMB 3 TO 5 STEPS WITH RAILING: A LITTLE
STANDING UP FROM CHAIR USING ARMS: A LITTLE
MOBILITY SCORE: 21
SUGGESTED CMS G CODE MODIFIER DAILY ACTIVITY: CJ
DRESSING REGULAR LOWER BODY CLOTHING: A LITTLE

## 2021-07-23 ASSESSMENT — GAIT ASSESSMENTS
DISTANCE (FEET): 40
GAIT LEVEL OF ASSIST: MINIMAL ASSIST
ASSISTIVE DEVICE: SINGLE POINT CANE
DEVIATION: BRADYKINETIC;DECREASED HEEL STRIKE;DECREASED TOE OFF

## 2021-07-23 ASSESSMENT — PAIN DESCRIPTION - PAIN TYPE: TYPE: ACUTE PAIN

## 2021-07-23 ASSESSMENT — ACTIVITIES OF DAILY LIVING (ADL): TOILETING: INDEPENDENT

## 2021-07-23 NOTE — THERAPY
"Physical Therapy   Initial Evaluation     Patient Name: Karlene Walters  Age:  81 y.o., Sex:  female  Medical Record #: [id number]  Today's Date: 7/23/2021     Precautions: Fall Risk, Swallow Precautions ( See Comments)    Assessment  Patient is 81 y.o. female presenting with persistent coughing, weakness, and SOB; PMH significant for colorectal cancer with liver mets. Pt mobilized as detailed below in chart demonstrating safe transfers, gait, and bed mobility but limited activity tolerance due to SOB. Pt reports she normally only is able to walk short distances in her home without sitting down and that she uses a cane as needed inside/outside. Pt also uses 4WW to sit on while in the kitchen and propels with her LEs. Pt has help from her  and dtr and has no further acute PT at this time as she appears to be at her functional baseline. Patient will not be actively followed for physical therapy services at this time, however may be seen if requested by physician for 1 more visit within 30 days to address any discharge or equipment needs.      Plan    Recommend Physical Therapy for Evaluation only.    DC Equipment Recommendations: None  Discharge Recommendations: Anticipate that the patient will have no further physical therapy needs after discharge from the hospital       Subjective    \"I can only walk around short distances before I need to sit.\"     Objective       07/23/21 1053   Prior Living Situation   Prior Services None   Housing / Facility Mobile Home   Steps Into Home 5   Steps In Home 0   Rail Both Rail (Steps into Home)   Bathroom Set up Bathtub / Shower Combination;Grab Bars;Shower Chair   Equipment Owned Front-Wheel Walker;4-Wheel Walker;Single Point Cane;Tub / Shower Seat;Grab Bar(s) In Tub / Shower;Raised Toilet Seat Without Arms   Lives with - Patient's Self Care Capacity Spouse   Comments pt lives with spouse who drives/ assists as able, however he has COPD so he cannot physically assist; pts " dtr lives across the street and assists with ADLs and IADLs   Prior Level of Functional Mobility   Bed Mobility Independent   Transfer Status Independent   Ambulation Independent   Distance Ambulation (Feet)   (mostly within the house)   Assistive Devices Used Single Point Cane   Stairs Independent   Comments pt reports she uses a SPC as needed; she mostly bumps up the stairs or crawls to avoid falling and is able to get up off the floor independently    History of Falls   History of Falls Yes   Cognition    Cognition / Consciousness WDL   Level of Consciousness Alert   Comments pleasant and cooperative   Passive ROM Lower Body   Passive ROM Lower Body WDL   Active ROM Lower Body    Active ROM Lower Body  WDL   Strength Lower Body   Lower Body Strength  WDL   Sensation Lower Body   Lower Extremity Sensation   WDL   Lower Body Muscle Tone   Lower Body Muscle Tone  WDL   Neurological Concerns   Neurological Concerns No   Coordination Lower Body    Coordination Lower Body  WDL   Gait Analysis   Gait Level Of Assist Minimal Assist  (CGA')   Assistive Device Single Point Cane   Distance (Feet) 40   # of Times Distance was Traveled 1   Deviation Bradykinetic;Decreased Heel Strike;Decreased Toe Off   # of Stairs Climbed 0   Level of Assist with Stairs   (pt declined need to trial as she climbs on all fours/bumps )   Weight Bearing Status no restrictions   Comments Pts distance limited by SOB/fatigue; reports at baseline this is about the distcnae she can tolerate; Discussed stairs and pt declined the need for trialing them as she normally climbs on all fours or bumps up the stairs with supervision    Bed Mobility    Supine to Sit Supervised   Sit to Supine Supervised   Scooting Supervised   Comments without use of bed accessories   Functional Mobility   Sit to Stand Supervised   Bed, Chair, Wheelchair Transfer Supervised   Toilet Transfers Minimal Assist   Comments w/o device needed   Activity Tolerance   Sitting in Chair  5mins   Sitting Edge of Bed 5mins   Standing 5mins   Education Group   Education Provided Role of Physical Therapist   Role of Physical Therapist Patient Response Patient;Acceptance;Demonstration;Explanation;Verbal Demonstration;Action Demonstration

## 2021-07-23 NOTE — CARE PLAN
The patient is Stable - Low risk of patient condition declining or worsening    Shift Goals  Clinical Goals: control cough  Patient Goals: rest    Progress made toward(s) clinical / shift goals:    Problem: Knowledge Deficit - Standard  Goal: Patient and family/care givers will demonstrate understanding of plan of care, disease process/condition, diagnostic tests and medications  Outcome: Progressing     Problem: Fall Risk  Goal: Patient will remain free from falls  Outcome: Progressing     Problem: Mobility  Goal: Patient's capacity to carry out activities will improve  Outcome: Progressing     Problem: Infection - Standard  Goal: Patient will remain free from infection  Outcome: Progressing     Problem: Respiratory  Goal: Patient will achieve/maintain optimum respiratory ventilation and gas exchange  Outcome: Progressing       Patient is not progressing towards the following goals:

## 2021-07-23 NOTE — CARE PLAN
The patient is Stable - Low risk of patient condition declining or worsening    Shift Goals  Clinical Goals: control cough  Patient Goals: rest    Progress made toward(s) clinical / shift goals: rest    Patient is not progressing towards the following goals: cough      Problem: Knowledge Deficit - Standard  Goal: Patient and family/care givers will demonstrate understanding of plan of care, disease process/condition, diagnostic tests and medications  Outcome: Progressing  Note: Pt is alert and oriented x 4; is aware and understands plan of care; all questions have been answered at this time      Problem: Fall Risk  Goal: Patient will remain free from falls  Outcome: Progressing  Note: Pt's room is close to the nurses' station; bed is locked in lowest position with bed alarm on; pt wearing treaded non-slip socks; calls for assistance appropriately

## 2021-07-23 NOTE — THERAPY
Occupational Therapy   Initial Evaluation     Patient Name: Karlene Walters  Age:  81 y.o., Sex:  female  Medical Record #: [id number]  Today's Date: 7/23/2021     Precautions  Precautions: Fall Risk, Swallow Precautions ( See Comments)    Assessment  Patient is 81 y.o. female admitted with shortness of breath and weakness, found to have aspiration pneumonia. Pt with PMHx of colorectal cancer with mets to liver. Pt presents to OT eval with impaired activity tolerance and impaired balance, however appears to be functioning at baseline. Pt able to complete LB dressing with spv, toilet transfer with Hanna using raised commode over toilet, and seated grooming/hygiene with setup. Pt has good family support who are able to assist with ADLs/IADLs and all needed DME/AD at home. Patient will not be actively followed for occupational therapy services at this time, however may be seen if requested by physician for 1 more visit within 30 days to address any discharge or equipment needs.     Plan    Recommend Occupational Therapy for Evaluation only.    DC Equipment Recommendations: None  Discharge Recommendations: Anticipate that the patient will have no further occupational therapy needs after discharge from the hospital. Defer to PT as pt has 5 steps to enter home.    Subjective    Pt was alert, pleasant, and cooperative with OT eval. No c/o pain during session.     Objective       07/23/21 1054   Prior Living Situation   Prior Services None   Housing / Facility Mobile Home   Steps Into Home 5   Steps In Home 0   Rail Both Rail (Steps into Home)   Bathroom Set up Bathtub / Shower Combination;Shower Chair;Grab Bars   Equipment Owned Front-Wheel Walker;4-Wheel Walker;Single Point Cane;Tub / Shower Seat;Grab Bar(s) In Tub / Shower;Raised Toilet Seat Without Arms   Lives with - Patient's Self Care Capacity Spouse   Comments Pt lives with spouse who is able to assist with IADLs and light ADLs. Daughter lives across the street and  is able to assist as well.   Prior Level of ADL Function   Self Feeding Independent   Grooming / Hygiene Independent   Bathing Independent   Dressing Independent   Toileting Independent   Prior Level of IADL Function   Medication Management Requires Assist   Laundry Requires Assist   Kitchen Mobility Requires Assist   Finances Requires Assist   Home Management Requires Assist   Shopping Requires Assist   Prior Level Of Mobility Supervision With Device in Community;Independent With Device in Home   Driving / Transportation Relatives / Others Provide Transportation   Occupation (Pre-Hospital Vocational) Retired Due To Age   Leisure Interests Unable To Determine At This Time   Cognition    Cognition / Consciousness WDL   Level of Consciousness Alert   Comments very pleasant, cooperative   Balance Assessment   Sitting Balance (Static) Fair +   Sitting Balance (Dynamic) Fair   Standing Balance (Static) Fair   Standing Balance (Dynamic) Poor +   Weight Shift Sitting Fair   Weight Shift Standing Fair   Comments seated with spv, standing with SPC, no overt LOB   Bed Mobility    Supine to Sit Supervised   ADL Assessment   Grooming Supervision;Seated  (oral care)   Lower Body Dressing Supervision  (socks)   Toileting   (declined need)   Functional Mobility   Sit to Stand Supervised   Toilet Transfers Minimal Assist  (with raised toilet seat on top)   Transfer Method Stand Step   Mobility in room/bathroom/hallway with SPC   Activity Tolerance   Comments increased SOB/WOB, required seated rest break, reports this is baseline   Patient / Family Goals   Patient / Family Goal #1 To go home

## 2021-07-23 NOTE — TELEPHONE ENCOUNTER
I Called left message regarding her schedule for today 10/02/18 see if she can come in at 8 am to see Ella MORALES for her per chemo visit thank you    VASCULAR SURGERY BRIEF FOLLOW-UP NOTE:    Repeat duplex reviewed and discussed with Dr. Hardin.  No flow demonstrated in previous pseudoaneurysm.  Continue light compression and elevation as needed for swelling.  Vascular surgery will sign off.        Becca Goyal DNP,APRN, AGNP-C  Division of Vascular Surgery   [facility]  Pager: [phone]

## 2021-07-23 NOTE — DISCHARGE SUMMARY
Discharge Summary    CHIEF COMPLAINT ON ADMISSION  Chief Complaint   Patient presents with   • Weakness     c/o loss of appetite for the past 6 days. SOB with exertion. Cancer patient ( colorectal ) with lilliana to liver and on chemo every other week.    • Cough     productive cough x weeks. greenish phlegm noted. afebrile.        Reason for Admission  Cough, Malaise, Cancer     Admission Date  7/19/2021    CODE STATUS  Full Code    HPI & HOSPITAL COURSE  Karlene Walters is a 81 y.o. female who presented 7/19/2021 to the Emergency Department for evaluation of generalized weakness. She has colorectal cancer with metastasis to the liver and is on chemotherapy every other week. She states that she has had a cough since June 1st, which has been worsening. The cough is productive, with green, yellow, and black sputum. She has been battling cancer for the last ten years, and Dr. Steele is her oncologist. She has had multiple hepatic ablation, and her last PET scan was several months ago. She is on Xarelto.   Patient was treated for suspected aspiration pneumonia. Pulmonology consulted who recommended antibiotic treatment and sputum sampling. CT scan showing nodular infiltrates in bilateral lung bases. Pulmonology recommend outpatient follow up with repeat CT scan in 6 weeks. Patient was treated with IV antibiotics with improvement in her symptoms. Sputum cultures with no growth. Patient on room air, feeling well. She will be discharged home with oral antibiotics as well as information for aspiration precautions. She is to follow up with pulmonology as well as oncology.    Therefore, she is discharged in good and stable condition to home with close outpatient follow-up.    The patient met 2-midnight criteria for an inpatient stay at the time of discharge.    Discharge Date  7/23/21    FOLLOW UP ITEMS POST DISCHARGE  Take medications as prescribed.  Follow up with pulmonology and oncology. Repeat CT scan chest in 6 weeks for  pneumonia/nodule follow up.    DISCHARGE DIAGNOSES  Active Problems:    Malignant neoplasm of rectum (HCC) (Chronic) POA: Yes    Anemia POA: Yes    Secondary malignant neoplasm of liver (HCC) (Chronic) POA: Yes    Dehydration POA: Yes    History of pulmonary embolus (PE) POA: Yes    Shortness of breath POA: Yes    Acute renal failure (ARF) (HCC) POA: Yes    Hyponatremia POA: Unknown    Aspiration pneumonia (HCC) POA: Yes  Resolved Problems:    * No resolved hospital problems. *      FOLLOW UP  Future Appointments   Date Time Provider Department Center   8/3/2021  8:00 AM [facility] [employer] [place]   8/3/2021  8:30 AM ARTURO Krishnan None   8[facility]2021 [facility]   8/5/2021  2:15 PM [facility]   8/17/2021  7[facility]30 [facility]   8/17/2021  8:00 AM ARTURO Krishnan None   8/17/2021  9:00 [facility]   8/19/2021 11:00 [facility]   8/30/2021  3:00 PM Lucho Steele M.D. [facility] None   8/31/2021  9:00 [facility]   9/2/2021 11:45 [facility]   9/30/2021 10:10 AM Jannet Ventura M.D. [facility] None   10/6/2021 10:15 AM Ahsan Haque M.D. [facility] None   10/18/2021  8:00 AM INFUSION QUICK INJECT ON [facility] [employer] [place]   10/18/2021  9:30 AM [address]   10/19/2021 11:30 AM Kit West M.D. [facility] None   12/8/2021  9:30 AM Rachael Coe M.D. [address]     No follow-up provider specified.    MEDICATIONS ON DISCHARGE     Medication List      START taking these medications      Instructions   amoxicillin-clavulanate 875-125 MG Tabs  Commonly known as: AUGMENTIN   Take 1 tablet by mouth 2 times a day for 3 days.  Dose: 1 tablet     guaiFENesin  MG Tb12  Commonly known as: MUCINEX   Take 1 tablet by mouth every 12 hours for 7 days.  Dose: 600 mg        CONTINUE taking these  medications      Instructions   * albuterol 108 (90 Base) MCG/ACT Aers inhalation aerosol   Doctor's comments: Ventolin if covered is the preferred brand BUT what ever the insurance allows  Inhale 2 Puffs every 6 hours as needed for Shortness of Breath.  Dose: 2 Puff     * albuterol 2.5mg/3ml Nebu solution for nebulization  Commonly known as: PROVENTIL   Take 3 mL by nebulization every four hours as needed for Shortness of Breath.  Dose: 2.5 mg     CENTRUM SILVER PO   Take 1 Tab by mouth every day.  Dose: 1 tablet     cyanocobalamin 500 MCG Tabs  Commonly known as: VITAMIN B-12   Take 1,000 mcg by mouth every day.  Dose: 1,000 mcg     Hydrocod Polst-CPM Polst ER 10-8 MG/5ML Suer  Commonly known as: Tussionex Pennkinetic ER   Take 5 mL by mouth every 12 hours as needed for Cough or Rhinitis for up to 28 days.  Dose: 5 mL     lidocaine-prilocaine 2.5-2.5 % Crea  Commonly known as: EMLA   Doctor's comments: Directions: Apply to port 1 hour prior to access of port and cover with plastic wrap.  Apply to port one hour prior to access and cover with plastic wrap.     loratadine 10 MG Tabs  Commonly known as: CLARITIN   Take 10 mg by mouth every day. Indications: Hayfever  Dose: 10 mg     ondansetron 4 MG Tabs tablet  Commonly known as: ZOFRAN   Take 1 tablet by mouth every four hours as needed for Nausea/Vomiting (for nausea, vomiting).  Dose: 4 mg     pantoprazole 40 MG Tbec  Commonly known as: PROTONIX   Take 1 tablet by mouth every day.  Dose: 40 mg     potassium chloride ER 10 MEQ tablet  Commonly known as: KLOR-CON   Take 1 tablet by mouth every day.  Dose: 10 mEq     predniSONE 10 MG Tabs  Commonly known as: DELTASONE   Take 40mg x 4 days, then take 30mg x 4 days, then take 20mg x 4 days, then 10mg x 4 days with food, then discontinue.     rivaroxaban 10 MG Tabs tablet  Commonly known as: Xarelto   Take 1 Tab by mouth every day.  Dose: 10 mg     Stiolto Respimat 2.5-2.5 MCG/ACT Aers  Generic drug: Tiotropium  "Bromide-Olodaterol   INHALE 2 PUFFS BY MOUTH ONCE DAILY     Vitamin D3 10 MCG (400 UNIT) Caps   Take 1 Cap by mouth every day.  Dose: 1 capsule         * This list has 2 medication(s) that are the same as other medications prescribed for you. Read the directions carefully, and ask your doctor or other care provider to review them with you.            STOP taking these medications    diphenoxylate-atropine 2.5-0.025 MG Tabs  Commonly known as: Lomotil     levoFLOXacin 500 MG tablet  Commonly known as: LEVAQUIN            Allergies  Allergies   Allergen Reactions   • Oxaliplatin Anaphylaxis   • Codeine      \"gets drunk\"   • Pcn [Penicillins] Itching     itching   • Sulfa Drugs Itching     itching   • Tape Rash     PAPER TAPE OK       DIET  Orders Placed This Encounter   Procedures   • Diet Order Diet: Regular     Standing Status:   Standing     Number of Occurrences:   1     Order Specific Question:   Diet:     Answer:   Regular [1]       ACTIVITY  As tolerated.  Weight bearing as tolerated    CONSULTATIONS  Pulmonology  oncology    PROCEDURES  none    LABORATORY  Lab Results   Component Value Date    SODIUM 137 07/23/2021    POTASSIUM 3.4 (L) 07/23/2021    CHLORIDE 107 07/23/2021    CO2 19 (L) 07/23/2021    GLUCOSE 88 07/23/2021    BUN 26 (H) 07/23/2021    CREATININE 0.69 07/23/2021        Lab Results   Component Value Date    WBC 6.4 07/23/2021    HEMOGLOBIN 8.4 (L) 07/23/2021    HEMATOCRIT 27.8 (L) 07/23/2021    PLATELETCT 402 07/23/2021        Total time of the discharge process exceeds 33 minutes.  "

## 2021-07-23 NOTE — ASSESSMENT & PLAN NOTE
Patient noted to have anemia. This may be related to chemotherapy treatment received 2 weeks ago. However iron studies were completed noting a low percent iron saturation at 6% as well as low iron. However TIBC is within normal limits. I have requested a ferritin level be drawn for further evaluation to determine if this is truly iron deficiency anemia versus anemia of chronic disease. Discussed with Dr. Steele.

## 2021-07-23 NOTE — PROGRESS NOTES
Hem/Onc Visit     Date of Admit:  7/19/2021  HD#: 3      Chief Complaint/Admit dx:  Weakness (c/o loss of appetite for the past 6 days. SOB with exertion. Cancer patient ( colorectal ) with lilliana to liver and on chemo every other week. ) and Cough (productive cough x weeks. greenish phlegm noted. afebrile. 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      Subjective:  HPI/Interval History:  Patient overall is feeling a little better. Cough and SOB have improved but are still present.   She had speech eval today. Recommend to increase diet.   Labs show anemia and iron studies completed showing % saturation of 6%, but normal TIBC.   Patient is on IV ABX.        Pertinent cancer history:  Patient with a long history of rectal carcinoma initially diagnosed in 2012.  She was found to have metastatic disease to liver and lung in 2015.  She is status post liver ablation as well as has received Y 90 in the past.  She was on XELOX initially but had allergic reaction to oxaliplatin (last dose 7/28/15) and was switched to single agent 5-FU, Oct 2015. May 2020 there was noted to have growth of disease within the liver and was seen by Dr. West, surgeon.  Patient did undergo surgery in hopes of resection of the tumor but it was felt to be too close to the hilum and therefore she did undergo ablation instead on 5/28/20.  She has been back on single agent 5-FU, restarted on 6/16/20. CT 10/06/20 showed the tumor ablation cavities within the right lobe of the liver have decreased in size since previous examination.  There is a 1.5 x 3.1 cm focal area of enhancement adjacent to the ablation cavity that appears to have increased concerning for possible tumor recurrence.  Based on that finding she was sent for an MRI for further evaluation on 10/8/2020 which confirmed that the appearance of the masses noted in the liver did not have an appearance for suspicious finding for recurrent metastatic disease.  Patient  "continued with MRI follow up imaging every 3 months, most recent on 4/5/21 showed stable appearance of the liver with distortion of the right lobe consistent with prior partial hepatectomy and ablation.  No evidence of recurrent tumor.  Enhancing soft tissue nodule in the right cardiophrenic angle again seen and unchanged consistent with adenopathy, measuring 17 mm in size.      March 2021 patient had experienced significant fatigue and shortness of breath.  This had progressively worsened. She was hospitalized in March. She was seen by cardiology and echocardiogram completed which showed an ejection fraction of 65%.  CT chest with contrast completed in March was stable. Continued and progressively worsening SOB, repeat CT chest 5/18/2021. Patient was seen by pulmonary 5/19/2021 and VQ scan negative. Pulmonary believes symptoms r/t GERD with aspiration and recently started on H2 blockers. MRI brain completed on 5/20/2021 showed no evidence of metastatic disease.     Dr. Steele suspected possible 5FU toxicity and was transitioned to Irinotecan/Avastin, first dose 7/6/21.      Review of Systems   Respiratory: Positive for cough and shortness of breath.         Overall improvement of respiratory status         Allergies   Allergen Reactions   • Oxaliplatin Anaphylaxis   • Codeine      \"gets drunk\"   • Pcn [Penicillins] Itching     itching   • Sulfa Drugs Itching     itching   • Tape Rash     PAPER TAPE OK         Objective:  /70   Pulse (!) 105   Temp 36.5 °C (97.7 °F) (Temporal)   Resp 16   Ht 1.651 m (5' 5\")   Wt 62.6 kg (138 lb)   LMP  (LMP Unknown)   SpO2 94%   BMI 22.96 kg/m²       Physical Exam  Vitals reviewed.   Constitutional:       General: She is not in acute distress.     Appearance: Normal appearance.   Pulmonary:      Effort: Pulmonary effort is normal.   Neurological:      Mental Status: She is alert and oriented to person, place, and time.   Psychiatric:         Mood and Affect: Mood " normal.         Behavior: Behavior normal.           CT-CHEST (THORAX) W/O    Result Date: 7/20/2021 7/20/2021 10:08 PM HISTORY/REASON FOR EXAM:  Pneumonia, effusion or abscess suspected, xray done; Dyspnea, chronic, unclear etiology; chronic cough 3+ months, immunocompromised on chemotherapy. TECHNIQUE/EXAM DESCRIPTION: CT scan of the chest without contrast. Noncontrast helical scanning of the chest was obtained from the lung apices through the adrenal glands. Low dose optimization technique was utilized for this CT exam including automated exposure control and adjustment of the mA and/or kV according to patient size. COMPARISON: None. FINDINGS: Lungs: Linear nodular consolidations of the bilateral lung bases are seen. Mediastinum/Randa: No significant adenopathy. Pleura: No pleural effusion. Cardiac: Heart normal in size without pericardial effusion. Vascular: Atherosclerotic changes are seen including atherosclerotic coronary artery calcifications. Soft tissues: Unremarkable. Bones: No acute or destructive process. Limited views the abdomen demonstrates hepatomegaly.     1.  Linear nodular infiltrates in the bilateral lung bases. Minimal scattered patchy groundglass pulmonary infiltrates. 2.  No acute thoracic abnormality identified. 3.  Atherosclerosis and atherosclerotic coronary artery disease Fleischner Society pulmonary nodule recommendations: Not Applicable     DX-CHEST-PORTABLE (1 VIEW)    Result Date: 7/19/2021 7/19/2021 11:41 PM HISTORY/REASON FOR EXAM: Cough TECHNIQUE/EXAM DESCRIPTION:  Single AP view of the chest. COMPARISON: June 4, 2021 FINDINGS: Left Port-A-Cath is seen terminating in the right atrium. The cardiac silhouette appears within normal limits. The mediastinal contour appears within normal limits.  The central pulmonary vasculature appears normal. The lungs appear well expanded bilaterally.  Hazy right lung base opacities are seen. No significant pleural effusions are identified. The  bony structures appear age-appropriate.     1.  Hazy right lung base opacities, concerning for infiltrates          Assessment and Plan:  Acute renal failure (ARF) (HCC)- (present on admission)  Assessment & Plan  Likely r/t to dehydration as patient is not eating or drinking anything at home. Improvement noted with the use of IV hydration. Will defer to hospital team.    Shortness of breath- (present on admission)  Assessment & Plan  Patient with severe SOB and persistent/worsening cough with green sputum production. She has seen Dr. Ventura and ANDREW Zee with pulmonary. VQ scan recently completed was negative.     Pulmonary consulted. CT Chest completed. Nodular infiltrates in the bilateral lung bases. Minimal scattered patchy groundglass pulmonary infiltrates. Patient started on IV antibiotics.    Thank you for assistance from pulmonary. Will defer to pulmonary/hospital team for continued management.    History of pulmonary embolus (PE)- (present on admission)  Assessment & Plan  Patient has been on a low dose Xarelto 10 mg PO daily for many years d/t increased risk for clots with her metastatic disease.     Dehydration- (present on admission)  Assessment & Plan  Improving with IV Hydration. Will defer to hospital team.      Secondary malignant neoplasm of liver (HCC)- (present on admission)  Assessment & Plan  Patient on Irinotecan/Avastin every 2 weeks - cycle 2 scheduled, 7/20/21. Hold chemo at this time until clinically improved. Plan to follow up in the clinic in 2 weeks to possibly restart treatment if clinically improved.    Continued monitoring of the liver with MRIs - next scheduled 10/18/21 per Dr. West.  Last MRI was stable with no evidence of recurrence.     Anemia- (present on admission)  Assessment & Plan  Patient noted to have anemia. This may be related to chemotherapy treatment received 2 weeks ago. However iron studies were completed noting a low percent iron saturation at 6%  as well as low iron. However TIBC is within normal limits. I have requested a ferritin level be drawn for further evaluation to determine if this is truly iron deficiency anemia versus anemia of chronic disease. Discussed with Dr. Steele.    Malignant neoplasm of rectum (HCC)- (present on admission)  Assessment & Plan  Patient on Irinotecan/Avastin every 2 weeks - cycle 2 scheduled for 7/20/21. Hold chemo at this time until clinically improved. Plan to follow up in the clinic in 2 weeks to possibly restart treatment if clinically improved.        ANDREW Krishnan  [facility]  Office of Dr. Steele  Phone: [phone]  Fax: [phone]

## 2021-07-23 NOTE — DISCHARGE INSTRUCTIONS
Aspiration Pneumonia  Aspiration pneumonia is an infection in the lungs. It occurs when saliva or liquid contaminated with bacteria is inhaled (aspirated) into the lungs. When these things get into the lungs, swelling (inflammation) and infection can occur. This can make it difficult to breathe. Aspiration pneumonia is a serious condition and can be life threatening.  What are the causes?  This condition is caused when saliva or liquid from the mouth, throat, or stomach is inhaled into the lungs, and when those fluids are contaminated with bacteria.  What increases the risk?  The following factors may make you more likely to develop this condition:  · A narrowing of the tube that carries food to the stomach (esophageal narrowing).  · Having gastroesophageal reflux disease (GERD).  · Having a weak immune system.  · Having diabetes.  · Having poor oral hygiene.  · Being malnourished.  The condition is more likely to occur when a person's cough (gag) reflex, or ability to swallow, has decreased. Some things that can cause this decrease include:  · Having a brain injury or disease, such as stroke, seizures, Parkinson disease, dementia, or amyotrophic lateral sclerosis (ALS).  · Being given a general anesthetic for procedures.  · Drinking too much alcohol. If a person passes out and vomits, vomit can be inhaled into the lungs.  · Taking certain medicines, such as tranquilizers or sedatives.  What are the signs or symptoms?  Symptoms of this condition include:  · Fever.  · A cough with secretions that are yellow, tan, or green.  · Breathing problems, such as wheezing or shortness of breath.  · Chest pain.  · Being more tired than usual (fatigue).  · Having a history of coughing while eating or drinking.  · Bad breath.  · Bluish color to the lips, skin, or fingers.  How is this diagnosed?  This condition may be diagnosed based on:  · A physical exam.  · Tests, such as:  ? Chest X-ray.  ? Sputum culture. Saliva and mucus  (sputum) are collected from the lungs or the tubes that carry air to the lungs (bronchi). The sputum is then tested for bacteria.  ? Oximetry. A sensor or clip is placed on areas such as a finger, earlobe, or toe to measure the oxygen level in your blood.  ? Blood tests.  ? Swallowing study. This test looks at how food is swallowed and whether it goes into your breathing tube (trachea) or esophagus.  ? Bronchoscopy. This test uses a flexible tube (bronchoscope) to see inside the lungs.  How is this treated?  This condition may be treated with:  · Medicines. Antibiotic medicine will be given to kill the pneumonia bacteria. Other medicines may also be used to reduce fever or pain.  · Breathing assistance and oxygen therapy. Depending on how well you are breathing, you may need to be given oxygen, or you may need breathing support from a breathing machine (ventilator).  · Thoracentesis. This is a procedure to remove fluid that has built up in the space between the linings of the chest wall and the lungs.  · Feeding tube and diet change. For people who have difficulty swallowing, a feeding tube might be placed in the stomach, or they may be asked to avoid certain food textures or liquids when eating.  Follow these instructions at home:  Medicines  · Take over-the-counter and prescription medicines only as told by your health care provider.  ? If you were prescribed an antibiotic medicine, take it as told by your health care provider. Do not stop taking the antibiotic even if you start to feel better.  ? Take cough medicine only if you are losing sleep. Cough medicine can prevent your body’s natural ability to remove mucus from your lungs.  General instructions  · Carefully follow any eating instructions you were given, such as avoiding certain food textures or thickening your liquids. Thickening liquids reduces the risk of developing aspiration pneumonia again.  · Use breathing exercises such as postural drainage, deep  breathing, and incentive spirometry to help expel secretions.  · Rest as instructed by your health care provider.  · Sleep in a semi-upright position at night. Try to sleep in a reclining chair, or place a few pillows under your head.  · Do not use any products that contain nicotine or tobacco, such as cigarettes and e-cigarettes. If you need help quitting, ask your health care provider.  · Keep all follow-up visits as told by your health care provider. This is important.  Contact a health care provider if:  · You have a fever.  · You have a worsening cough with yellow, tan, or green secretions.  · You have coughing while eating or drinking.  Get help right away if:  · You have worsening shortness of breath, wheezing, or difficulty breathing.  · You have chest pain.  Summary  · Aspiration pneumonia is an infection in the lungs. It is caused when saliva or liquid from the mouth, throat, or stomach is inhaled into the lungs.  · Aspiration pneumonia is more likely to occur when a person's cough reflex or ability to swallow has decreased.  · Symptoms of aspiration pneumonia include coughing, breathing problems, fever, and chest pain.  · Aspiration pneumonia may be treated with antibiotic medicine, other medicines to reduce pain or fever, and breathing assistance or oxygen therapy.  This information is not intended to replace advice given to you by your health care provider. Make sure you discuss any questions you have with your health care provider.  Document Released: 10/15/2010 Document Revised: 11/30/2018 Document Reviewed: 01/23/2018  [employer] Patient Education © 2020 [employer]    Discharge Instructions    Discharged to home by car with relative. Discharged via wheelchair, hospital escort: Refused.  Special equipment needed: Cane    Be sure to schedule a follow-up appointment with your primary care doctor or any specialists as instructed.     Discharge Plan:   Diet Plan: Discussed  Activity Level:  Discussed  Confirmed Follow up Appointment: Appointment Scheduled  Confirmed Symptoms Management: Discussed  Medication Reconciliation Updated: Yes    I understand that a diet low in cholesterol, fat, and sodium is recommended for good health. Unless I have been given specific instructions below for another diet, I accept this instruction as my diet prescription.   Other diet: Regular    Special Instructions: None    · Is patient discharged on Warfarin / Coumadin?   No     Depression / Suicide Risk    As you are discharged from this [facility] facility, it is important to learn how to keep safe from harming yourself.    Recognize the warning signs:  · Abrupt changes in personality, positive or negative- including increase in energy   · Giving away possessions  · Change in eating patterns- significant weight changes-  positive or negative  · Change in sleeping patterns- unable to sleep or sleeping all the time   · Unwillingness or inability to communicate  · Depression  · Unusual sadness, discouragement and loneliness  · Talk of wanting to die  · Neglect of personal appearance   · Rebelliousness- reckless behavior  · Withdrawal from people/activities they love  · Confusion- inability to concentrate     If you or a loved one observes any of these behaviors or has concerns about self-harm, here's what you can do:  · Talk about it- your feelings and reasons for harming yourself  · Remove any means that you might use to hurt yourself (examples: pills, rope, extension cords, firearm)  · Get professional help from the community (Mental Health, Substance Abuse, psychological counseling)  · Do not be alone:Call your Safe Contact- someone whom you trust who will be there for you.  · Call your local CRISIS HOTLINE 275-2386 or 846-459-3621  · Call your local Children's Mobile Crisis Response Team [place] (958) 381-6622 or www.[employer]  · Call the toll free National Suicide Prevention Hotlines   · National  Suicide Prevention Lifeline 879-161-ZLJD (1447)  · National [place] Line Network 800-SUICIDE (120-5396)

## 2021-07-23 NOTE — PROGRESS NOTES
81 female with metastatic rectal cancer on chemotherapy with Irinotecan plus Avastin admitted with cough, SOB and presumed pneumonia. She feels she is getting better. Chemo was due this week but on hold until pulmonary situation resolves. She has become anemic with H/H 8.4/27.8. Iron studies plus ferritin most consistent with anemia chronic disease plus some element of iatrogenic anemia from lab draws.

## 2021-07-23 NOTE — DISCHARGE PLANNING
Care Transition Team Discharge Planning    Anticipated Discharge Information  Discharge Disposition: Discharged to home/self care (01)         Discharge Plan: Patient is discharging home to her prior living arrangement, she has support from her spouse and daughter who lives across the street. No needs from Case Management. Patient will follow up with Pulmonary as outpatient.    Loreto Wyatt RN,

## 2021-07-23 NOTE — CARE PLAN
Problem: Nutritional:  Goal: Achieve adequate nutritional intake  Description: Diet will advance beyond NPO / clear liquids and patient will consume >50% of meals  Outcome: Progressing     Patient is now on a regular diet.  She reported she ate most of breakfast today and is enjoying chocolate milk TID.

## 2021-07-23 NOTE — PROGRESS NOTES
Pulmonary Consultation Note    Patient ID:   Name:             Karlene Walters   YOB: 1940  Age:                 81 y.o.  female   MRN:               [id number]  Referring Provider: Dr. Leslye Fletcher                                                   History of Present Illness:     Patient is an 80 y/o F with PMH significant for colorectal cancer w/ mets to liver (dx 2012, s/p multiple chemo regimens, currently on Irinotecan+Avastin, last cycle 2 weeks ago), pulmonary embolism (on Xarelto), restless legs syndrome, HTN/HLD who presented on 7/19 for generalized weakness. Since March 2021 she has had progressive worsening shortness of breath with productive cough containing greenish-yellowish sputum. Her cough was originally attributed as a side effect of 5-FU chemo but it continued even after that chemo regimen was discontinued. Prior to hospitalization, Asha Malone from Pulmonary was consulted and ordered sputum culture, prednisone taper, levofloxacin, and robitussin; however, patient was admitted to the hospital before these could be performed. Patient was admitted to the hospital for profound weakness, shortness of breath, cough and dehydration. Pulmonary has been consulted to further evaluate this shortness of breath.    Regarding cough: Patient states the cough is so bad sometimes she cannot sleep at night. Concomitant symptoms include shortness of breath and sore throat without hemoptysis, fever or chills. She does not have a history of lung disease and she does not smoke, use illicit drugs, or drink alcohol. She previously went to a doctor who attributed her symptoms to GERD and prescribed her omeprazole which was later changed to pantoprazole; pantoprazole did help her symptoms somewhat but the cough has still continued.    Subjective: Patient's cough has improved today from yesterday. She states she generally feels better; she is coughing up less green bile but instead more clear phlegm  and feels that this phlegm is easier to cough up. She was able to sleep well; she feels she can also breathe deeply more easily.    Review of Systems: Review of Systems   Constitutional: Positive for malaise/fatigue. Negative for chills, diaphoresis and fever.   HENT: Positive for sore throat. Negative for sinus pain.    Respiratory: Positive for cough and sputum production. Negative for hemoptysis, shortness of breath and wheezing.         Cough improved from 7/22   Cardiovascular: Negative for chest pain, palpitations, leg swelling and PND.   Gastrointestinal: Positive for abdominal pain and heartburn. Negative for nausea and vomiting.   Musculoskeletal: Negative for falls.   Neurological: Positive for weakness. Negative for sensory change, speech change, focal weakness and headaches.   Psychiatric/Behavioral: Negative for memory loss. The patient does not have insomnia.        Past Medical History:   Past Medical History:   Diagnosis Date   • Adverse effect of anesthesia     elevated BP postop   • Anesthesia    • Arrhythmia     occasional   • Blood clotting disorder (HCC) 08/2015    bilat PE    • Blood transfusion 1957   • Breath shortness     pt reports d/t chemo treatment; no O2; with moderate exertion; no c/o at this time   • Cancer ([facility]) 09/2012    rectal cancer,  Liver CA-2015   • CATARACT     removed b/l   • Chemotherapy-induced neutropenia (HCC) 9/28/2016   • Chickenpox     history of   • Colon cancer ([facility])    • Dental disorder     dentures UPPERS AND LOWERS   • Elevated alkaline phosphatase level 11/15/2017   • Emphysema of lung ([facility])     COPD   • Fall    • [place] measles     history of   • Heart murmur     as a child   • Hemorrhagic disorder (HCC)     on Xarelto    • High cholesterol    • Hypercholesteremia    • Hypertension     no meds currently    • Ileostomy in place ([facility])    • Ileostomy in place ([facility])    • Indigestion    • Influenza     history of   • Lightheadedness 9/17/2015   • Mumps     history  "of   • Obstruction     ileostomy   • Other specified symptom associated with female genital organs     HYSTERECTOMY 1993   • Pneumonia 05/2017    tx'd with antibx   • Pulmonary embolism (HCC)    • 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)    • Renal insufficiency 3/14/2016   • Restless legs 5/6/2020   • Routine general medical examination at a health care facility 3/14/2016    3/14/16   • Seasonal allergies    • Swelling of both ankles     Lasix PRN   • Tonsillitis      Objective:   Vitals/ General Appearance:   Weight/BMI: Body mass index is 22.96 kg/m².  /65   Pulse 89   Temp 36.4 °C (97.5 °F) (Temporal)   Resp 16   Ht 1.651 m (5' 5\")   Wt 62.6 kg (138 lb)   SpO2 96%   Vitals:    07/22/21 1937 07/23/21 0309 07/23/21 0827 07/23/21 0835   BP: 124/70 126/66  125/65   Pulse: (!) 107 91 92 89   Resp: 18 18  16   Temp: 36.7 °C (98 °F) 36.2 °C (97.2 °F)  36.4 °C (97.5 °F)   TempSrc: Temporal Temporal  Temporal   SpO2: 96% 91% 93% 96%   Weight:       Height:         Oxygen Therapy:  Pulse Oximetry: 96 %, O2 Delivery Device: None - Room Air    Constitutional:   Ill-appearing, thin, No acute distress.    HENT:  Normocephalic, Atraumatic, Oropharynx moist mucous membranes, Alopecia present. Not visibly coughing.  Eyes:  EOMI, Conjunctiva normal, No discharge.  Neck:  Normal range of motion  Cardiovascular:  Normal heart rate, Normal rhythm, Extremities with intact distal pulses, no cyanosis, or edema.  Lungs:  Wheeze in RLL, diminished breath sound in LLL, clear in all other lobes b/l.   Abdomen: Bowel sounds normal, Soft, No tenderness, No guarding  Skin: Warm, Dry, No erythema, No rash, no induration.  Neurologic: Alert & oriented x 4, No focal deficits noted, cranial nerves II through X are grossly intact.  Psychiatric: Affect normal, Judgment normal, Mood normal.    Labs:  Recent Labs     07/22/21  0040 07/23/21  0041   WBC 7.5 6.4   RBC 4.11* 3.24*   HEMOGLOBIN 10.7* 8.4*   HEMATOCRIT 34.4* 27.8*   MCV " 83.7 85.8   MCH 26.0* 25.9*   MCHC 31.1* 30.2*   RDW 59.2* 60.9*   PLATELETCT 542* 402   MPV 9.3 9.1                 Recent Labs     07/22/21  0040 07/23/21  0041   SODIUM 131* 137   POTASSIUM 4.2 3.4*   CHLORIDE 99 107   CO2 20 19*   GLUCOSE 97 88   BUN 31* 26*     Recent Labs     07/22/21  0040 07/23/21  0041   SODIUM 131* 137   POTASSIUM 4.2 3.4*   CHLORIDE 99 107   CO2 20 19*   BUN 31* 26*   CREATININE 1.01 0.69   CALCIUM 9.4 8.0*     Results for orders placed or performed during the hospital encounter of 10/06/17   ECHOCARDIOGRAM COMP W/O CONT   Result Value Ref Range    Eject.Frac. MOD BP 56.87     Eject.Frac. MOD 4C 53.7     Eject.Frac. MOD 2C 60.84     Left Ventrical Ejection Fraction 60          Imaging:   CT-CHEST (THORAX) W/O   Final Result         1.  Linear nodular infiltrates in the bilateral lung bases. Minimal scattered patchy groundglass pulmonary infiltrates.   2.  No acute thoracic abnormality identified.   3.  Atherosclerosis and atherosclerotic coronary artery disease      Fleischner Society pulmonary nodule recommendations:   Not Applicable         DX-CHEST-PORTABLE (1 VIEW)   Final Result         1.  Hazy right lung base opacities, concerning for infiltrates          Hospital Medications:    Current Facility-Administered Medications:   •  prochlorperazine (COMPAZINE) injection 10 mg, 10 mg, Intravenous, Q6HRS PRN, Efraín Corona M.D., 10 mg at 07/21/21 1708  •  ampicillin/sulbactam (UNASYN) 3 g in  mL IVPB, 3 g, Intravenous, Q6HRS, Leslye Fletcher M.D., Stopped at 07/23/21 0700  •  diphenhydrAMINE (BENADRYL) injection 25 mg, 25 mg, Intravenous, Q6HRS PRN, Leslye Fletcher M.D.  •  omeprazole (PRILOSEC) capsule 20 mg, 20 mg, Oral, BID, Leslye Fletcher M.D., 20 mg at 07/23/21 0630  •  umeclidinium-vilanterol (ANORO ELLIPTA) inhaler 1 Puff, 1 Puff, Inhalation, QDAILY (RT), Olman Dwyer M.D.  •  ondansetron (ZOFRAN) syringe/vial injection 4 mg, 4 mg, Intravenous, Q4HRS PRN, Leslye PERES  "MARIANA Fletcher, 4 mg at 07/21/21 0329  •  guaiFENesin ER (MUCINEX) tablet 600 mg, 600 mg, Oral, Q12HRS, Yamileth Colon M.D., 600 mg at 07/23/21 0630  •  menthol (HALLS) lozenge 1 Lozenge, 1 Lozenge, Oral, Q2HRS PRN, Yamileth Colon M.D.  •  albuterol (PROVENTIL) 2.5 mg/0.5 mL nebulizer solution 2.5 mg, 2.5 mg, Nebulization, Q4H PRN (RT), Olman Dwyer M.D.  •  rivaroxaban (XARELTO) tablet 10 mg, 10 mg, Oral, QDAY, Olman Dwyer M.D., 10 mg at 07/23/21 0028  •  senna-docusate (PERICOLACE or SENOKOT S) 8.6-50 MG per tablet 2 tablet, 2 tablet, Oral, BID **AND** polyethylene glycol/lytes (MIRALAX) PACKET 1 Packet, 1 Packet, Oral, QDAY PRN **AND** magnesium hydroxide (MILK OF MAGNESIA) suspension 30 mL, 30 mL, Oral, QDAY PRN **AND** bisacodyl (DULCOLAX) suppository 10 mg, 10 mg, Rectal, QDAY PRN, Olman Dwyer M.D.  •  NS infusion 2,000 mL, 2,000 mL, Intravenous, Continuous, Olman Dwyer M.D., Last Rate: 150 mL/hr at 07/23/21 0630, 2,000 mL at 07/23/21 0630  •  acetaminophen (Tylenol) tablet 650 mg, 650 mg, Oral, Q6HRS PRN, Olman Dwyer M.D., 650 mg at 07/20/21 2248  •  labetalol (NORMODYNE/TRANDATE) injection 10 mg, 10 mg, Intravenous, Q4HRS PRN, Olman Dwyer M.D.    Medication Allergy:  Allergies   Allergen Reactions   • Oxaliplatin Anaphylaxis   • Codeine      \"gets drunk\"   • Pcn [Penicillins] Itching     itching   • Sulfa Drugs Itching     itching   • Tape Rash     PAPER TAPE OK       Assessment and Plan:  Active Problems:    Malignant neoplasm of rectum (HCC) (Chronic) POA: Yes    Anemia POA: Yes    Secondary malignant neoplasm of liver (HCC) (Chronic) POA: Yes    Dehydration POA: Yes    History of pulmonary embolus (PE) POA: Yes    Shortness of breath POA: Yes    Acute renal failure (ARF) (HCC) POA: Yes    Hyponatremia POA: Unknown    Aspiration pneumonia (HCC) POA: Yes  Resolved Problems:    * No resolved hospital problems. *    82 y/o F with PMH significant for colorectal cancer w/ mets to liver (dx " 2012, s/p multiple chemo regimens, currently on Irinotecan+Avastin, last cycle 2 weeks ago), pulmonary embolism (on Xarelto), restless legs syndrome, HTN/HLD who presented on 7/19 for generalized weakness. Pulmonary has been consulted for persistent productive cough lasting 3+ months. Per our reading of imaging: hyperinflated lung fields, indicative of severe emphysematous disease, COPD. Patient's symptoms have now improved. Formal SLP eval was performed and recommended advancing diet. Pt has had pulmonary nodule in RLL for years (followed by Dr. Steele) which has been stable; as a result Dr. Steele did not recommend further aggressive treatment. However, this lesion is still concerning and given her chronic aspiration she still needs to be followed up outpatient with pulm. We discussed the plan with the patient who is in agreement.    #Aspiration pneumonia  #Metastatic colorectal cancer  #S/p chemotherapy  #Pulmonary Hypertension (mild)  #RLL pulmonary lesion    -Transition to PO antibiotics -> Augmentin to complete 2 weeks of antibiotics  -Continue pantoprazole  -Follow up with pulmonary in 6 weeks as outpatient - will repeat CT thorax to follow pulmonary lesion    Discussed with Dr. Johnson from primary team. Thank you for this consult.    Yamileth Colon MD  PGY2

## 2021-07-24 LAB
M PNEUMO IGG SER IA-ACNC: 1.86 U/L
M PNEUMO IGM SER IA-ACNC: 0.08 U/L

## 2021-07-24 NOTE — DISCHARGE PLANNING
Meds-to-Beds: Discharge prescription orders listed below delivered to patient's bedside. RN notified. Patient counseled. Patient elected to have co-payment billed to patient account.       Karlene Walters   Home Medication Instructions SHANELL:[id number]    Printed on:07/23/21 2402   Medication Information                      amoxicillin-clavulanate (AUGMENTIN) 875-125 MG Tab  Take 1 tablet by mouth 2 times a day for 10 days.             guaiFENesin ER (MUCINEX) 600 MG TABLET SR 12 HR  Take 1 tablet by mouth every 12 hours for 7 days.               Louise Burgess, PharmD

## 2021-07-26 ENCOUNTER — PATIENT OUTREACH (OUTPATIENT)
Dept: MEDICAL GROUP | Facility: MEDICAL CENTER | Age: 81
End: 2021-07-26

## 2021-07-27 ENCOUNTER — APPOINTMENT (OUTPATIENT)
Dept: HEMATOLOGY ONCOLOGY | Facility: MEDICAL CENTER | Age: 81
End: 2021-07-27
Payer: MEDICARE

## 2021-07-30 ENCOUNTER — TELEPHONE (OUTPATIENT)
Dept: HEMATOLOGY ONCOLOGY | Facility: MEDICAL CENTER | Age: 81
End: 2021-07-30

## 2021-07-30 DIAGNOSIS — C20 MALIGNANT NEOPLASM OF RECTUM (HCC): ICD-10-CM

## 2021-07-30 DIAGNOSIS — R06.02 SOB (SHORTNESS OF BREATH): ICD-10-CM

## 2021-07-30 NOTE — PROGRESS NOTES
Patient phoned RN re persistent SOB, does not have O2 at home -  does not feel ER is warranted yet, is amenable to HH evaluation for O2 needs.    Referral placed - urgently

## 2021-07-30 NOTE — TELEPHONE ENCOUNTER
Kennedi called me back and stated that they can see the pt we just need to fill out there referral form. Filled out and sent over stated that they can see pt Monday in the afternoon sometime

## 2021-07-30 NOTE — TELEPHONE ENCOUNTER
Received a call from [facility] the pharmacist at [facility] in [place] regarding filling the Tussionex. Per provider Asha GONZALEZ. Patient declined to take RX as noted on 7/21/21. Pharmacist aware.

## 2021-07-30 NOTE — TELEPHONE ENCOUNTER
Called [facility] again to see what we need to move forward with referral spoke to Kennedi and she stated that they will not be able to get anyone out to her until next week, but is going to text someone and give me a call back when they text back, she stated that that they have a referral for us if so. Gave her my direct extension and awaiting a call back.

## 2021-07-30 NOTE — TELEPHONE ENCOUNTER
Referral for home health placed urgently. Called [facility] to get her in for home health they stated that Shruti is the one who give out oxygen,  Called [facility] and they stated she needs a qualified walk test to get oxygen, spoke to Elaine who stated that is the reason why we are getting home health to get the test.    Than called [facility] health in [place] and they stated that they are not taking new pts at this time and call [facility] and ask for Karl    Got transferred, it rang and sounded like it was cell phone phone number [phone], had to leave a voicemail, left a detailed message for someone to call me back.

## 2021-07-30 NOTE — TELEPHONE ENCOUNTER
"Rec'vd call from pt with c/o increased shortness of breath with mobility.  Pt states she feels better overall since d/c from inpatient, but extremely short of breath (\"no air\") with just walking across the room.  Pt reports she was using a rolling chair with assist from spouse to get around the house, but now feels unsafe as it is prone to tipping over.  Pt states her cough has decreased but still producing thick phlegm (given antibx & Mucinex upon d/c from hospital & f/u with pulm). Pt questioning whether or not she needs to come into town next week for office visit & treatment or reschedule.    Consulted with Maria M MORALES regarding pt concerns.  ANDREW suggested Mucinex BID (pt already taking) & referral for home health or dispatch to pt's home to assess for need for oxygen.  Pt agreeable with APRNs recommendation & will plan to attend her appts next week as long as the oxygen helps with mobility.    APRN placed referral.  "

## 2021-08-02 ENCOUNTER — TELEPHONE (OUTPATIENT)
Dept: SLEEP MEDICINE | Facility: MEDICAL CENTER | Age: 81
End: 2021-08-02

## 2021-08-02 ENCOUNTER — TELEPHONE (OUTPATIENT)
Dept: HEMATOLOGY ONCOLOGY | Facility: MEDICAL CENTER | Age: 81
End: 2021-08-02

## 2021-08-02 RX ORDER — SODIUM CHLORIDE 9 MG/ML
INJECTION, SOLUTION INTRAVENOUS CONTINUOUS
Status: CANCELLED | OUTPATIENT
Start: 2021-09-28

## 2021-08-02 RX ORDER — ONDANSETRON 2 MG/ML
4 INJECTION INTRAMUSCULAR; INTRAVENOUS PRN
Status: CANCELLED | OUTPATIENT
Start: 2021-09-28

## 2021-08-02 RX ORDER — 0.9 % SODIUM CHLORIDE 0.9 %
3 VIAL (ML) INJECTION PRN
Status: CANCELLED | OUTPATIENT
Start: 2021-09-27

## 2021-08-02 RX ORDER — 0.9 % SODIUM CHLORIDE 0.9 %
10 VIAL (ML) INJECTION PRN
Status: CANCELLED | OUTPATIENT
Start: 2021-10-12

## 2021-08-02 RX ORDER — PROCHLORPERAZINE MALEATE 10 MG
10 TABLET ORAL EVERY 6 HOURS PRN
Status: CANCELLED | OUTPATIENT
Start: 2021-10-12

## 2021-08-02 RX ORDER — LOPERAMIDE HYDROCHLORIDE 2 MG/1
4 CAPSULE ORAL PRN
Status: CANCELLED | OUTPATIENT
Start: 2021-10-12

## 2021-08-02 RX ORDER — 0.9 % SODIUM CHLORIDE 0.9 %
3 VIAL (ML) INJECTION PRN
Status: CANCELLED | OUTPATIENT
Start: 2021-09-28

## 2021-08-02 RX ORDER — ONDANSETRON 2 MG/ML
4 INJECTION INTRAMUSCULAR; INTRAVENOUS PRN
Status: CANCELLED | OUTPATIENT
Start: 2021-10-12

## 2021-08-02 RX ORDER — ONDANSETRON 8 MG/1
8 TABLET, ORALLY DISINTEGRATING ORAL PRN
Status: CANCELLED | OUTPATIENT
Start: 2021-09-28

## 2021-08-02 RX ORDER — HEPARIN SODIUM (PORCINE) LOCK FLUSH IV SOLN 100 UNIT/ML 100 UNIT/ML
500 SOLUTION INTRAVENOUS PRN
Status: CANCELLED | OUTPATIENT
Start: 2021-09-28

## 2021-08-02 RX ORDER — HEPARIN SODIUM (PORCINE) LOCK FLUSH IV SOLN 100 UNIT/ML 100 UNIT/ML
500 SOLUTION INTRAVENOUS PRN
Status: CANCELLED | OUTPATIENT
Start: 2021-10-11

## 2021-08-02 RX ORDER — 0.9 % SODIUM CHLORIDE 0.9 %
10 VIAL (ML) INJECTION PRN
Status: CANCELLED | OUTPATIENT
Start: 2021-09-27

## 2021-08-02 RX ORDER — LOPERAMIDE HYDROCHLORIDE 2 MG/1
2 CAPSULE ORAL
Status: CANCELLED | OUTPATIENT
Start: 2021-10-12

## 2021-08-02 RX ORDER — EPINEPHRINE 1 MG/ML(1)
0.5 AMPUL (ML) INJECTION PRN
Status: CANCELLED | OUTPATIENT
Start: 2021-09-28

## 2021-08-02 RX ORDER — 0.9 % SODIUM CHLORIDE 0.9 %
10 VIAL (ML) INJECTION PRN
Status: CANCELLED | OUTPATIENT
Start: 2021-10-11

## 2021-08-02 RX ORDER — HEPARIN SODIUM (PORCINE) LOCK FLUSH IV SOLN 100 UNIT/ML 100 UNIT/ML
500 SOLUTION INTRAVENOUS PRN
Status: CANCELLED | OUTPATIENT
Start: 2021-10-12

## 2021-08-02 RX ORDER — PROCHLORPERAZINE MALEATE 10 MG
10 TABLET ORAL EVERY 6 HOURS PRN
Status: CANCELLED | OUTPATIENT
Start: 2021-09-28

## 2021-08-02 RX ORDER — HEPARIN SODIUM (PORCINE) LOCK FLUSH IV SOLN 100 UNIT/ML 100 UNIT/ML
500 SOLUTION INTRAVENOUS PRN
Status: CANCELLED | OUTPATIENT
Start: 2021-09-27

## 2021-08-02 RX ORDER — 0.9 % SODIUM CHLORIDE 0.9 %
VIAL (ML) INJECTION PRN
Status: CANCELLED | OUTPATIENT
Start: 2021-10-11

## 2021-08-02 RX ORDER — METHYLPREDNISOLONE SODIUM SUCCINATE 125 MG/2ML
125 INJECTION, POWDER, LYOPHILIZED, FOR SOLUTION INTRAMUSCULAR; INTRAVENOUS PRN
Status: CANCELLED | OUTPATIENT
Start: 2021-10-12

## 2021-08-02 RX ORDER — 0.9 % SODIUM CHLORIDE 0.9 %
10 VIAL (ML) INJECTION PRN
Status: CANCELLED | OUTPATIENT
Start: 2021-09-28

## 2021-08-02 RX ORDER — 0.9 % SODIUM CHLORIDE 0.9 %
3 VIAL (ML) INJECTION PRN
Status: CANCELLED | OUTPATIENT
Start: 2021-10-11

## 2021-08-02 RX ORDER — EPINEPHRINE 1 MG/ML(1)
0.5 AMPUL (ML) INJECTION PRN
Status: CANCELLED | OUTPATIENT
Start: 2021-10-12

## 2021-08-02 RX ORDER — 0.9 % SODIUM CHLORIDE 0.9 %
VIAL (ML) INJECTION PRN
Status: CANCELLED | OUTPATIENT
Start: 2021-09-28

## 2021-08-02 RX ORDER — ONDANSETRON 8 MG/1
8 TABLET, ORALLY DISINTEGRATING ORAL PRN
Status: CANCELLED | OUTPATIENT
Start: 2021-10-12

## 2021-08-02 RX ORDER — 0.9 % SODIUM CHLORIDE 0.9 %
VIAL (ML) INJECTION PRN
Status: CANCELLED | OUTPATIENT
Start: 2021-09-27

## 2021-08-02 RX ORDER — DEXTROSE MONOHYDRATE 50 MG/ML
INJECTION, SOLUTION INTRAVENOUS CONTINUOUS
Status: CANCELLED | OUTPATIENT
Start: 2021-10-12

## 2021-08-02 RX ORDER — 0.9 % SODIUM CHLORIDE 0.9 %
3 VIAL (ML) INJECTION PRN
Status: CANCELLED | OUTPATIENT
Start: 2021-10-12

## 2021-08-02 RX ORDER — SODIUM CHLORIDE 9 MG/ML
INJECTION, SOLUTION INTRAVENOUS CONTINUOUS
Status: CANCELLED | OUTPATIENT
Start: 2021-10-12

## 2021-08-02 RX ORDER — LOPERAMIDE HYDROCHLORIDE 2 MG/1
4 CAPSULE ORAL PRN
Status: CANCELLED | OUTPATIENT
Start: 2021-09-28

## 2021-08-02 RX ORDER — DIPHENHYDRAMINE HYDROCHLORIDE 50 MG/ML
50 INJECTION INTRAMUSCULAR; INTRAVENOUS PRN
Status: CANCELLED | OUTPATIENT
Start: 2021-10-12

## 2021-08-02 RX ORDER — DIPHENHYDRAMINE HYDROCHLORIDE 50 MG/ML
50 INJECTION INTRAMUSCULAR; INTRAVENOUS PRN
Status: CANCELLED | OUTPATIENT
Start: 2021-09-28

## 2021-08-02 RX ORDER — DEXTROSE MONOHYDRATE 50 MG/ML
INJECTION, SOLUTION INTRAVENOUS CONTINUOUS
Status: CANCELLED | OUTPATIENT
Start: 2021-09-28

## 2021-08-02 RX ORDER — 0.9 % SODIUM CHLORIDE 0.9 %
VIAL (ML) INJECTION PRN
Status: CANCELLED | OUTPATIENT
Start: 2021-10-12

## 2021-08-02 RX ORDER — METHYLPREDNISOLONE SODIUM SUCCINATE 125 MG/2ML
125 INJECTION, POWDER, LYOPHILIZED, FOR SOLUTION INTRAMUSCULAR; INTRAVENOUS PRN
Status: CANCELLED | OUTPATIENT
Start: 2021-09-28

## 2021-08-02 RX ORDER — LOPERAMIDE HYDROCHLORIDE 2 MG/1
2 CAPSULE ORAL
Status: CANCELLED | OUTPATIENT
Start: 2021-09-28

## 2021-08-02 NOTE — TELEPHONE ENCOUNTER
Patient contacted, confirms completion 8.5 days Amoxicillin.  Instructed to begin prednisone as previously ordered.  Patient reports oxygen ordered on discharge but was told [facility] in [place] is no longer taking new patients as they are too busy.  Patient reports sats mid 80s yesterday with any activity and 89-90 today at rest. Patient is travelling to [place] this afternoon, she is not driving.  She will stop by clinic for [facility] tomorrow and has an appt. on 8/5/21.  Provider contacted local [facility] office and spoke to Edilberto who will investigate and call pulmonary office back with update.

## 2021-08-02 NOTE — TELEPHONE ENCOUNTER
Rec'vd call from pulm stating that after review of pt's chart, pt had established care with pulm in June & has an appt in Sept already.  Pulm stated they will call pt to remind her of appt & place pt on a list if any cancellations occur & pt can be seen sooner.

## 2021-08-02 NOTE — TELEPHONE ENCOUNTER
Per request of Ella MORALES, called pt to f/u regarding current symptoms & home health/paramedicine referral.  Per note from Colleen RODRIGUEZ, communication was made with paramedicine & plan was for pt to be seen for an in home oxygen assessment this afternoon.  Per info from Colleen Bhakta note, attempted to reach paramedicine at [phone], no answer but message left to return call to Med/Onc office ASAP.  When RN spoke to pt, pt stated that she is feeling a little better & is planning to drive to [place] this afternoon with her daughter for tomorrow's office visit & scheduled treatment.  Pt reports she developed a rash (from the antibiotic she was given upon d/c from hospital) & pt states she is allergic to PCNs.  Pt is questioning whether to start taking her prednisone now or when & states that she has not heard from Pulmonology.    Called Pulmonology clinic & spoke with Dominique whom stated that she saw that the referral rec'vd, but pt had not been contacted nor scheduled yet.  Informed pulm that pt is experiencing complications from hospital d/c for pneumonia & needs to be seen ASAP.  Informed pulm that pt lives in [place] & is driving to [place] today for Med/Onc appt & treatment scheduled for tomorrow & if pt could be scheduled then, but pulm stated that the referral still needed to be reviewed & pt cannot be scheduled for tomorrow.  RN asked to expedite the referral review as quickly as possible.

## 2021-08-02 NOTE — TELEPHONE ENCOUNTER
Elaine from oncology called, states referral placed to be established with Pulmonary and patient had not heard from us. Referral was placed during her hospital stay 7/23/21    After reviewing patient chart, pt is already established with pulmonary. Last seen 6/21/2021 Dr. Ventura. Patient scheduled to follow up 9/30/21 with Dr. Ventura. I spoke to the patient, she had contacted clinic for clarification was advised by provider to contact pulmonologist regarding antibiotic. Patient was prescribed amoxicillin from hospital stay. Patient developed a reaction, itching and rash. Per patient she is allergic to penicillins. Patient was advised from home care to discontinue use. patient stopped yesterday.  Per pt she was instructed to start prednisone after finishing antibiotic. Patient patient requesting confirmation if she should start taking prednisone or a new script for a different antibiotic should be sent to pharmacy. Pt was prescribed tussinex from hospital stay and finished Friday. Per pt still coughing, SOB, horsiness, sputum has cleared. Pt declined other symptoms.      Per pt she was D/C'd with oxygen but DME was to go to her home Monday. Per Pt she will not be home she is currently in town for appts. Pt states she monitors oxygen with oximeter and runs in the 80's at rest. Patient does not have home oxygen. Patient scheduled to see KENDY Olivera pa-c 8/3/21. Please advise prescription be taken before appt.

## 2021-08-03 ENCOUNTER — APPOINTMENT (OUTPATIENT)
Dept: ONCOLOGY | Facility: MEDICAL CENTER | Age: 81
End: 2021-08-03
Attending: INTERNAL MEDICINE
Payer: MEDICARE

## 2021-08-03 ENCOUNTER — OFFICE VISIT (OUTPATIENT)
Dept: HEMATOLOGY ONCOLOGY | Facility: MEDICAL CENTER | Age: 81
End: 2021-08-03
Payer: MEDICARE

## 2021-08-03 ENCOUNTER — NON-PROVIDER VISIT (OUTPATIENT)
Dept: SLEEP MEDICINE | Facility: MEDICAL CENTER | Age: 81
End: 2021-08-03
Payer: MEDICARE

## 2021-08-03 ENCOUNTER — NON-PROVIDER VISIT (OUTPATIENT)
Dept: HEMATOLOGY ONCOLOGY | Facility: MEDICAL CENTER | Age: 81
End: 2021-08-03
Payer: MEDICARE

## 2021-08-03 VITALS
RESPIRATION RATE: 113 BRPM | HEIGHT: 66 IN | TEMPERATURE: 96.9 F | BODY MASS INDEX: 21.26 KG/M2 | HEART RATE: 113 BPM | DIASTOLIC BLOOD PRESSURE: 60 MMHG | WEIGHT: 132.28 LBS | SYSTOLIC BLOOD PRESSURE: 96 MMHG | OXYGEN SATURATION: 95 %

## 2021-08-03 VITALS
HEART RATE: 115 BPM | TEMPERATURE: 96.9 F | DIASTOLIC BLOOD PRESSURE: 66 MMHG | OXYGEN SATURATION: 96 % | RESPIRATION RATE: 20 BRPM | SYSTOLIC BLOOD PRESSURE: 110 MMHG

## 2021-08-03 VITALS
TEMPERATURE: 98 F | OXYGEN SATURATION: 98 % | HEART RATE: 110 BPM | SYSTOLIC BLOOD PRESSURE: 116 MMHG | WEIGHT: 132.28 LBS | HEIGHT: 64 IN | DIASTOLIC BLOOD PRESSURE: 70 MMHG | BODY MASS INDEX: 22.58 KG/M2 | RESPIRATION RATE: 18 BRPM

## 2021-08-03 DIAGNOSIS — C78.7 SECONDARY MALIGNANT NEOPLASM OF LIVER (HCC): ICD-10-CM

## 2021-08-03 DIAGNOSIS — C20 MALIGNANT NEOPLASM OF RECTUM (HCC): ICD-10-CM

## 2021-08-03 DIAGNOSIS — C18.9 METASTATIC COLON CANCER IN FEMALE: ICD-10-CM

## 2021-08-03 DIAGNOSIS — R06.02 SOB (SHORTNESS OF BREATH): ICD-10-CM

## 2021-08-03 DIAGNOSIS — E86.0 DEHYDRATION: ICD-10-CM

## 2021-08-03 DIAGNOSIS — Z95.828 PORT-A-CATH IN PLACE: ICD-10-CM

## 2021-08-03 DIAGNOSIS — R05.9 COUGH: ICD-10-CM

## 2021-08-03 DIAGNOSIS — N17.9 ACUTE RENAL FAILURE, UNSPECIFIED ACUTE RENAL FAILURE TYPE (HCC): ICD-10-CM

## 2021-08-03 LAB
ALBUMIN SERPL BCP-MCNC: 3.7 G/DL (ref 3.2–4.9)
ALBUMIN/GLOB SERPL: 0.9 G/DL
ALP SERPL-CCNC: 304 U/L (ref 30–99)
ALT SERPL-CCNC: 27 U/L (ref 2–50)
ANION GAP SERPL CALC-SCNC: 17 MMOL/L (ref 7–16)
APPEARANCE UR: CLEAR
AST SERPL-CCNC: 25 U/L (ref 12–45)
BASOPHILS # BLD AUTO: 1 % (ref 0–1.8)
BASOPHILS # BLD: 0.15 K/UL (ref 0–0.12)
BILIRUB SERPL-MCNC: 0.4 MG/DL (ref 0.1–1.5)
BUN SERPL-MCNC: 52 MG/DL (ref 8–22)
CALCIUM SERPL-MCNC: 10.4 MG/DL (ref 8.5–10.5)
CHLORIDE SERPL-SCNC: 94 MMOL/L (ref 96–112)
CO2 SERPL-SCNC: 20 MMOL/L (ref 20–33)
COLOR UR AUTO: YELLOW
CREAT SERPL-MCNC: 2.1 MG/DL (ref 0.5–1.4)
EOSINOPHIL # BLD AUTO: 0.04 K/UL (ref 0–0.51)
EOSINOPHIL NFR BLD: 0.3 % (ref 0–6.9)
ERYTHROCYTE [DISTWIDTH] IN BLOOD BY AUTOMATED COUNT: 60.4 FL (ref 35.9–50)
GLOBULIN SER CALC-MCNC: 4.3 G/DL (ref 1.9–3.5)
GLUCOSE SERPL-MCNC: 141 MG/DL (ref 65–99)
GLUCOSE UR QL STRIP.AUTO: NEGATIVE MG/DL
HCT VFR BLD AUTO: 42.6 % (ref 37–47)
HGB BLD-MCNC: 13.4 G/DL (ref 12–16)
IMM GRANULOCYTES # BLD AUTO: 0.22 K/UL (ref 0–0.11)
IMM GRANULOCYTES NFR BLD AUTO: 1.4 % (ref 0–0.9)
KETONES UR QL STRIP.AUTO: ABNORMAL MG/DL
LEUKOCYTE ESTERASE UR QL STRIP.AUTO: NEGATIVE
LYMPHOCYTES # BLD AUTO: 2.2 K/UL (ref 1–4.8)
LYMPHOCYTES NFR BLD: 14 % (ref 22–41)
MCH RBC QN AUTO: 26.5 PG (ref 27–33)
MCHC RBC AUTO-ENTMCNC: 31.5 G/DL (ref 33.6–35)
MCV RBC AUTO: 84.2 FL (ref 81.4–97.8)
MONOCYTES # BLD AUTO: 1.18 K/UL (ref 0–0.85)
MONOCYTES NFR BLD AUTO: 7.5 % (ref 0–13.4)
NEUTROPHILS # BLD AUTO: 11.93 K/UL (ref 2–7.15)
NEUTROPHILS NFR BLD: 75.8 % (ref 44–72)
NITRITE UR QL STRIP.AUTO: NEGATIVE
NRBC # BLD AUTO: 0 K/UL
NRBC BLD-RTO: 0 /100 WBC
PH UR STRIP.AUTO: 5.5 [PH] (ref 5–8)
PLATELET # BLD AUTO: 570 K/UL (ref 164–446)
PMV BLD AUTO: 10.1 FL (ref 9–12.9)
POTASSIUM SERPL-SCNC: 5 MMOL/L (ref 3.6–5.5)
PROT SERPL-MCNC: 8 G/DL (ref 6–8.2)
PROT UR QL STRIP: ABNORMAL MG/DL
RBC # BLD AUTO: 5.06 M/UL (ref 4.2–5.4)
RBC UR QL AUTO: NEGATIVE
SODIUM SERPL-SCNC: 131 MMOL/L (ref 135–145)
SP GR UR STRIP.AUTO: >=1.03 (ref 1–1.03)
WBC # BLD AUTO: 15.7 K/UL (ref 4.8–10.8)

## 2021-08-03 PROCEDURE — 81002 URINALYSIS NONAUTO W/O SCOPE: CPT

## 2021-08-03 PROCEDURE — 80053 COMPREHEN METABOLIC PANEL: CPT

## 2021-08-03 PROCEDURE — 85025 COMPLETE CBC W/AUTO DIFF WBC: CPT

## 2021-08-03 PROCEDURE — 36591 DRAW BLOOD OFF VENOUS DEVICE: CPT

## 2021-08-03 PROCEDURE — 99214 OFFICE O/P EST MOD 30 MIN: CPT | Performed by: NURSE PRACTITIONER

## 2021-08-03 PROCEDURE — A4212 NON CORING NEEDLE OR STYLET: HCPCS

## 2021-08-03 RX ORDER — 0.9 % SODIUM CHLORIDE 0.9 %
VIAL (ML) INJECTION PRN
Status: CANCELLED | OUTPATIENT
Start: 2021-08-03

## 2021-08-03 RX ORDER — HEPARIN SODIUM (PORCINE) LOCK FLUSH IV SOLN 100 UNIT/ML 100 UNIT/ML
500 SOLUTION INTRAVENOUS PRN
Status: DISCONTINUED | OUTPATIENT
Start: 2021-08-03 | End: 2021-08-03 | Stop reason: HOSPADM

## 2021-08-03 RX ORDER — 0.9 % SODIUM CHLORIDE 0.9 %
10 VIAL (ML) INJECTION PRN
Status: DISCONTINUED | OUTPATIENT
Start: 2021-08-03 | End: 2021-08-03 | Stop reason: HOSPADM

## 2021-08-03 RX ORDER — SODIUM CHLORIDE 9 MG/ML
1000 INJECTION, SOLUTION INTRAVENOUS ONCE
Status: CANCELLED | OUTPATIENT
Start: 2021-08-03 | End: 2021-08-03

## 2021-08-03 RX ORDER — 0.9 % SODIUM CHLORIDE 0.9 %
3 VIAL (ML) INJECTION PRN
Status: CANCELLED | OUTPATIENT
Start: 2021-08-03

## 2021-08-03 RX ORDER — HEPARIN SODIUM (PORCINE) LOCK FLUSH IV SOLN 100 UNIT/ML 100 UNIT/ML
500 SOLUTION INTRAVENOUS PRN
Status: CANCELLED | OUTPATIENT
Start: 2021-08-03

## 2021-08-03 RX ORDER — 0.9 % SODIUM CHLORIDE 0.9 %
10 VIAL (ML) INJECTION PRN
Status: CANCELLED | OUTPATIENT
Start: 2021-08-03

## 2021-08-03 RX ORDER — SODIUM CHLORIDE 9 MG/ML
1000 INJECTION, SOLUTION INTRAVENOUS ONCE
Status: COMPLETED | OUTPATIENT
Start: 2021-08-03 | End: 2021-08-03

## 2021-08-03 RX ADMIN — Medication 10 ML: at 12:45

## 2021-08-03 RX ADMIN — SODIUM CHLORIDE 1000 ML: 9 INJECTION, SOLUTION INTRAVENOUS at 10:30

## 2021-08-03 RX ADMIN — HEPARIN SODIUM (PORCINE) LOCK FLUSH IV SOLN 100 UNIT/ML 500 UNITS: 100 SOLUTION at 12:50

## 2021-08-03 ASSESSMENT — ENCOUNTER SYMPTOMS
SHORTNESS OF BREATH: 1
DIZZINESS: 0
DIARRHEA: 0
VOMITING: 0
CONSTIPATION: 0
WEAKNESS: 1
COUGH: 1
CHILLS: 0
NAUSEA: 0
FEVER: 0
SPUTUM PRODUCTION: 1
HEMOPTYSIS: 0
PALPITATIONS: 0
WEIGHT LOSS: 1

## 2021-08-03 ASSESSMENT — FIBROSIS 4 INDEX
FIB4 SCORE: 1.03
FIB4 SCORE: 1.03

## 2021-08-03 ASSESSMENT — PAIN SCALES - GENERAL: PAINLEVEL: NO PAIN

## 2021-08-03 NOTE — NON-PROVIDER
APRN in to see pt. while IV hydration still infusing.  Reiterated to pt the importance to stay hydrated with fluids orally & nutrition maintenance (high protein shakes, cream soups).  Pt stated her dentures are loose in her mouth & have created sores which make eating certain foods difficult, but ok to eat softer and/or liquids without the dentures in.  APRN suggested pt remove her dentures & consume whatever she can orally to provide nutrition balance.  APRN instructed pt to receive another liter of NS tomorrow in office & then f/u with pulm (KENDY Olivera) on Thurs prior to heading home to [place].  APRN reviewed ED precautions with pt & pt spouse, voiced understanding & agreeable with plan of care at this time.

## 2021-08-03 NOTE — PROGRESS NOTES
Pt arrives to [facility] for pre-chemotherapy labs via wheelchair.  Pt reports weakness in her legs and SOB with exertion.  Pt denies s/s of infection.  Pt reports she was on Amoxicillin but, developed a rash and stopped medication.  Amoxicillin added to med allergy list.  EMLA cream applied to NATALI port site by pt.  WMLA cream removed.   NATALI port accessed with sterile technique, flushed with NS and brisk blood return observed.  Labs drawn as ordered.  Port flushed with NS and secured with tape.  Pt left IS with spouse to go to appt with CARLOS Krishnan.  Informed pt she has appt with Pulmonology after that appt.  Pt to return later this morning for treatment.

## 2021-08-03 NOTE — PROGRESS NOTES
Urine specimen that pt brought in this morning processed.  Urine protein has trace amount and meets parameters for treatment.

## 2021-08-03 NOTE — NON-PROVIDER
Pt arrived via w/c accompanied by spouse for IV hydration visit.  Port to left upper chest was left accessed from [facility] for hydration visit.  Flushed with NS & patent, connected to IV pump for 1 L NS bolus (start time 1030).  Consulted with Ella MORALES regarding fluid rate.  Verbal instruction rec'vd to run over 2 hours.  Pt resting comfortably with any s/s of distress in procedure chair.  Gary at chairside & spouse in room with pt.

## 2021-08-03 NOTE — PROGRESS NOTES
Subjective:      Karlene Walters is a 81 y.o. 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  She is scheduled to proceed with cycle 2 of Irinotecan/Avastin today (delayed x2 weeks).  She presents accompanied by her  for today's visit.     Cancer History  Patient with a long history of rectal carcinoma initially diagnosed in 2012.  She was found to have metastatic disease to liver and lung in 2015.  She is status post liver ablation as well as has received Y 90 in the past.  She was on XELOX initially but had allergic reaction to oxaliplatin (last dose 7/28/15) and was switched to single agent 5-FU. Patient initially on 5-FU at 2400 mg/m2 with Leucovorin and 5-FU push starting on 10/27/15, followed by 20% dose reduction to 1920 mg/m2 for cycle 18 (7/5/16), followed by deletion of Leucovorin and 5-FU push on 10/31/17. In January 2020 patient was changed to every 3-week cycle at cycle #99 ([place] #91) and then changed to every 4-week interval in May 2020.  It was at that time there was noted to have growth of disease within the liver and was seen by Dr. West, surgeon.  Patient did undergo surgery in hopes of resection of the tumor but it was felt to be too close to the hilum and therefore she did undergo ablation instead on 5/28/20.  She has been back on single agent 5-FU at every 2-week interval, restarted on 6/16/20. Last CT 10/06/20 showed the tumor ablation cavities within the right lobe of the liver have decreased in size since previous examination.  There is a 1.5 x 3.1 cm focal area of enhancement adjacent to the ablation cavity that appears to have increased concerning for possible tumor recurrence.  Based on that finding she was sent for an MRI for further evaluation on 10/8/2020 which confirmed that the appearance of the masses noted in the liver did not have an appearance for suspicious finding for  recurrent metastatic disease.  Patient continued with MRI follow up imaging every 3 months, most recent on 4/5/21 showed stable appearance of the liver with distortion of the right lobe consistent with prior partial hepatectomy and ablation.  No evidence of recurrent tumor.  Enhancing soft tissue nodule in the right cardiophrenic angle again seen and unchanged consistent with adenopathy, measuring 17 mm in size.      March 2021 patient had experienced significant fatigue and shortness of breath.  This had progressively worsened. She was hospitalized in March. She was seen by cardiology and echocardiogram completed which showed an ejection fraction of 65%.  CT chest with contrast completed in March was stable. Continued and progressively worsening SOB, repeat CT chest 5/18/2021. Patient was seen by pulmonary 5/19/2021 and VQ scan negative. MRI brain completed on 5/20/2021 showed no evidence of metastatic disease.     Dr. Steele suspects 5FU toxicity and was transitioned to Irinotecan/Avastin, first dose 7/6/21.     Interval History  Patient admitted to hospital on what would have been day 1, cycle 2 of treatment approximately 2 weeks ago for weakness, shortness of breath, cough and dehydration.  She was hospitalized for approximately 1 week and consultation with pulmonary was completed during inpatient.  Patient has had continued shortness of breath, coughing and profound weakness that has worsened over the last few months.  During hospitalization CT chest completed showing linear nodular infiltrates in the bilateral lower lobes, with minimal scattered patchy groundglass pulmonary infiltrates.  She was diagnosed with pneumonia and started on IV antibiotics during hospitalization.  Patient was discharged home with Augmentin.  Per patient she is allergic to penicillin and she developed a rash while on Augmentin.  She stated the rash progressively worsened, therefore on day 8 of her treatment she discontinued medication  "as she had a complete all of her body rash.  That has since resolved since discontinuing the medication.    Patient presents today for possible restart of chemotherapy.  However, patient is clinically worse than previous when hospitalized.  She is profoundly weak, requiring wheelchair in order to ambulate due to severe shortness of breath with any ambulation.  She does continue to still have cough with very mild sputum.  Her voice is very weak.  On room air her oxygen sat 95%.  She has no appetite and has down 11 pounds in approximately 2 weeks.  Patient is scheduled to be seen by a nonprovider visit in pulmonary office after this visit today.    Allergies   Allergen Reactions   • Oxaliplatin Anaphylaxis   • Codeine      \"gets drunk\"   • Pcn [Penicillins] Itching     itching   • Sulfa Drugs Itching     itching   • Tape Rash     PAPER TAPE OK   • Amoxicillin Rash     Rash all over body     Current Outpatient Medications on File Prior to Visit   Medication Sig Dispense Refill   • STIOLTO RESPIMAT 2.5-2.5 MCG/ACT Aero Soln INHALE 2 PUFFS BY MOUTH ONCE DAILY 1 Each 5   • predniSONE (DELTASONE) 10 MG Tab Take 40mg x 4 days, then take 30mg x 4 days, then take 20mg x 4 days, then 10mg x 4 days with food, then discontinue. 40 tablet 0   • ondansetron (ZOFRAN) 4 MG Tab tablet Take 1 tablet by mouth every four hours as needed for Nausea/Vomiting (for nausea, vomiting). 30 tablet 6   • lidocaine-prilocaine (EMLA) 2.5-2.5 % Cream Apply to port one hour prior to access and cover with plastic wrap. 1 Each 3   • pantoprazole (PROTONIX) 40 MG Tablet Delayed Response Take 1 tablet by mouth every day. 90 tablet 3   • albuterol 108 (90 Base) MCG/ACT Aero Soln inhalation aerosol Inhale 2 Puffs every 6 hours as needed for Shortness of Breath. 1 Each 11   • potassium chloride ER (KLOR-CON) 10 MEQ tablet Take 1 tablet by mouth every day. 100 tablet 3   • rivaroxaban (XARELTO) 10 MG Tab tablet Take 1 Tab by mouth every day. 30 Tab 5   • " "cyanocobalamin (VITAMIN B-12) 500 MCG Tab Take 1,000 mcg by mouth every day.     • Multiple Vitamins-Minerals (CENTRUM SILVER PO) Take 1 Tab by mouth every day.     • Cholecalciferol (VITAMIN D3) 400 UNITS CAPS Take 1 Cap by mouth every day.     • loratadine (CLARITIN) 10 MG TABS Take 10 mg by mouth every day. Indications: Hayfever     • Hydrocod Polst-CPM Polst ER (TUSSIONEX PENNKINETIC ER) 10-8 MG/5ML Suspension Extended Release Take 5 mL by mouth every 12 hours as needed for Cough or Rhinitis for up to 28 days. (Patient not taking: Reported on 8/3/2021) 140 mL 0   • albuterol (PROVENTIL) 2.5mg/3ml Nebu Soln solution for nebulization Take 3 mL by nebulization every four hours as needed for Shortness of Breath. (Patient not taking: Reported on 8/3/2021) 120 mL 11     No current facility-administered medications on file prior to visit.       Review of Systems   Constitutional: Positive for malaise/fatigue and weight loss. Negative for chills and fever.   HENT:        Hoarse voice   Respiratory: Positive for cough (mild), sputum production (very mild green sputum still ) and shortness of breath. Negative for hemoptysis.    Cardiovascular: Negative for chest pain and palpitations.   Gastrointestinal: Negative for constipation, diarrhea, nausea and vomiting.        Normal output via stool   Genitourinary: Negative for dysuria.   Skin: Positive for rash (resolved when d/c Augmentin).   Neurological: Positive for weakness. Negative for dizziness.          Objective:     BP (!) 96/60   Pulse (!) 113   Temp 36.1 °C (96.9 °F) (Temporal)   Resp (!) 113   Ht 1.664 m (5' 5.5\")   Wt 60 kg (132 lb 4.4 oz)   LMP  (LMP Unknown)   SpO2 95%   BMI 21.68 kg/m²      Physical Exam  Vitals reviewed.   Constitutional:       General: She is in acute distress (severe SOB ).      Comments: Cachetic   Cardiovascular:      Rate and Rhythm: Regular rhythm. Tachycardia present.   Pulmonary:      Effort: Respiratory distress present.      " Breath sounds: No wheezing.      Comments: Decreased breath sounds throughout - crackles noted in the RLL  Abdominal:      General: Bowel sounds are normal. There is no distension.      Palpations: Abdomen is soft.      Tenderness: There is no abdominal tenderness.   Musculoskeletal:         General: Normal range of motion.      Comments: Weakness noted - in a wheelchair as unable to ambulate r/t respiratory status   Skin:     General: Skin is warm and dry.   Neurological:      Mental Status: She is alert and oriented to person, place, and time.   Psychiatric:         Mood and Affect: Mood normal.         Behavior: Behavior normal.       Results for KANG HALL (MRN [id number])   Ref. Range 8/3/2021 08:33   WBC Latest Ref Range: 4.8 - 10.8 K/uL 15.7 (H)   RBC Latest Ref Range: 4.20 - 5.40 M/uL 5.06   Hemoglobin Latest Ref Range: 12.0 - 16.0 g/dL 13.4   Hematocrit Latest Ref Range: 37.0 - 47.0 % 42.6   MCV Latest Ref Range: 81.4 - 97.8 fL 84.2   MCH Latest Ref Range: 27.0 - 33.0 pg 26.5 (L)   MCHC Latest Ref Range: 33.6 - 35.0 g/dL 31.5 (L)   RDW Latest Ref Range: 35.9 - 50.0 fL 60.4 (H)   Platelet Count Latest Ref Range: 164 - 446 K/uL 570 (H)   MPV Latest Ref Range: 9.0 - 12.9 fL 10.1   Neutrophils-Polys Latest Ref Range: 44.00 - 72.00 % 75.80 (H)   Neutrophils (Absolute) Latest Ref Range: 2.00 - 7.15 K/uL 11.93 (H)   Lymphocytes Latest Ref Range: 22.00 - 41.00 % 14.00 (L)   Lymphs (Absolute) Latest Ref Range: 1.00 - 4.80 K/uL 2.20   Monocytes Latest Ref Range: 0.00 - 13.40 % 7.50   Monos (Absolute) Latest Ref Range: 0.00 - 0.85 K/uL 1.18 (H)   Eosinophils Latest Ref Range: 0.00 - 6.90 % 0.30   Eos (Absolute) Latest Ref Range: 0.00 - 0.51 K/uL 0.04   Basophils Latest Ref Range: 0.00 - 1.80 % 1.00   Baso (Absolute) Latest Ref Range: 0.00 - 0.12 K/uL 0.15 (H)   Immature Granulocytes Latest Ref Range: 0.00 - 0.90 % 1.40 (H)   Immature Granulocytes (abs) Latest Ref Range: 0.00 - 0.11 K/uL 0.22 (H)   Nucleated RBC  Latest Units: /100 WBC 0.00   NRBC (Absolute) Latest Units: K/uL 0.00   Sodium Latest Ref Range: 135 - 145 mmol/L 131 (L)   Potassium Latest Ref Range: 3.6 - 5.5 mmol/L 5.0   Chloride Latest Ref Range: 96 - 112 mmol/L 94 (L)   Co2 Latest Ref Range: 20 - 33 mmol/L 20   Anion Gap Latest Ref Range: 7.0 - 16.0  17.0 (H)   Glucose Latest Ref Range: 65 - 99 mg/dL 141 (H)   Bun Latest Ref Range: 8 - 22 mg/dL 52 (H)   Creatinine Latest Ref Range: 0.50 - 1.40 mg/dL 2.10 (H)   GFR If  Latest Ref Range: >60 mL/min/1.73 m 2 27 (A)   GFR If Non  Latest Ref Range: >60 mL/min/1.73 m 2 23 (A)   Calcium Latest Ref Range: 8.5 - 10.5 mg/dL 10.4   AST(SGOT) Latest Ref Range: 12 - 45 U/L 25   ALT(SGPT) Latest Ref Range: 2 - 50 U/L 27   Alkaline Phosphatase Latest Ref Range: 30 - 99 U/L 304 (H)   Total Bilirubin Latest Ref Range: 0.1 - 1.5 mg/dL 0.4   Albumin Latest Ref Range: 3.2 - 4.9 g/dL 3.7   Total Protein Latest Ref Range: 6.0 - 8.2 g/dL 8.0   Globulin Latest Ref Range: 1.9 - 3.5 g/dL 4.3 (H)   A-G Ratio Latest Units: g/dL 0.9          Assessment/Plan:       1. Malignant neoplasm of rectum (HCC)                      2. Secondary malignant neoplasm of liver (HCC)    3. SOB (shortness of breath)    4. Cough        1.  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  This is currently 2 weeks delayed due to her hospitalization.  However clinically patient is not in a state to proceed with any chemotherapy at this time we will continue to hold treatment.    Labs have been completed today, CBC and CMP.  She does have leukocytosis likely related to infectious process.  She is hyponatremic which was consistent during her hospitalization.  She also has kidney dysfunction.  Will provide patient with fluid NS x1 L today.  Also noted elevation in her alkaline phosphatase at 304.  We will continue to monitor these labs  closely.    2.  Patient with profound weakness, shortness of breath and cough all likely related to infectious process.  Unfortunately antibiotic provided to patient caused rash and she did not complete the full antibiotic provided to her.  She is being seen by pulmonary today, and as well as a provider visit this coming Thursday, 8/5/2021.  Oxygenation at room air is at 95%.  Will defer to pulmonary for further evaluation to determine if patient may need at home oxygen during ambulation as patient does become profound short of breath and unable to ambulate very far.  Will defer on further management of patient's cough and shortness of breath to pulmonary as well as this appears to not be cancer related.    Patient to follow-up in the clinic in 2 weeks, or sooner if needed.  Will attempt possibility of restarting chemotherapy based on her clinical status at that time.      Addendum: Patient returned to clinic on Wednesday, 8/4/2021.  She stated she felt much better after receiving 1 L of fluid the day prior.  We did provide patient with another liter of fluid in the clinic of NS.  She stated her appetite has improved as well and was drinking a smoothie.  She did confirm that she is being seen by primary care office as well as pulmonary tomorrow.  We have tentatively scheduled her for possible hydration on Friday, 8/6/2021.  She is hoping to go home this weekend to [place].  However, will base upon further evaluation and patient's clinical status as well as update from office visits scheduled for tomorrow.  Patient to follow-up with us after her pulmonary visit with regards to plan of care.

## 2021-08-04 ENCOUNTER — NON-PROVIDER VISIT (OUTPATIENT)
Dept: HEMATOLOGY ONCOLOGY | Facility: MEDICAL CENTER | Age: 81
End: 2021-08-04
Payer: MEDICARE

## 2021-08-04 ENCOUNTER — TELEPHONE (OUTPATIENT)
Dept: HEMATOLOGY ONCOLOGY | Facility: MEDICAL CENTER | Age: 81
End: 2021-08-04

## 2021-08-04 ENCOUNTER — HOSPITAL ENCOUNTER (OUTPATIENT)
Facility: MEDICAL CENTER | Age: 81
End: 2021-08-04
Attending: NURSE PRACTITIONER
Payer: MEDICARE

## 2021-08-04 VITALS
TEMPERATURE: 96.9 F | WEIGHT: 134.48 LBS | DIASTOLIC BLOOD PRESSURE: 70 MMHG | SYSTOLIC BLOOD PRESSURE: 100 MMHG | BODY MASS INDEX: 22.04 KG/M2 | HEART RATE: 98 BPM | RESPIRATION RATE: 18 BRPM | OXYGEN SATURATION: 98 %

## 2021-08-04 DIAGNOSIS — N17.9 ACUTE RENAL FAILURE, UNSPECIFIED ACUTE RENAL FAILURE TYPE (HCC): ICD-10-CM

## 2021-08-04 DIAGNOSIS — E86.0 DEHYDRATION: ICD-10-CM

## 2021-08-04 DIAGNOSIS — Z95.828 PORT-A-CATH IN PLACE: ICD-10-CM

## 2021-08-04 DIAGNOSIS — C20 MALIGNANT NEOPLASM OF RECTUM (HCC): ICD-10-CM

## 2021-08-04 DIAGNOSIS — C18.9 METASTATIC COLON CANCER IN FEMALE: ICD-10-CM

## 2021-08-04 LAB
ANION GAP SERPL CALC-SCNC: 17 MMOL/L (ref 7–16)
BUN SERPL-MCNC: 49 MG/DL (ref 8–22)
CALCIUM SERPL-MCNC: 9.3 MG/DL (ref 8.5–10.5)
CHLORIDE SERPL-SCNC: 98 MMOL/L (ref 96–112)
CO2 SERPL-SCNC: 16 MMOL/L (ref 20–33)
CREAT SERPL-MCNC: 1.87 MG/DL (ref 0.5–1.4)
GLUCOSE SERPL-MCNC: 157 MG/DL (ref 65–99)
POTASSIUM SERPL-SCNC: 4.7 MMOL/L (ref 3.6–5.5)
SODIUM SERPL-SCNC: 131 MMOL/L (ref 135–145)

## 2021-08-04 PROCEDURE — 80048 BASIC METABOLIC PNL TOTAL CA: CPT

## 2021-08-04 RX ORDER — 0.9 % SODIUM CHLORIDE 0.9 %
VIAL (ML) INJECTION PRN
Status: DISCONTINUED | OUTPATIENT
Start: 2021-08-04 | End: 2021-08-04

## 2021-08-04 RX ORDER — 0.9 % SODIUM CHLORIDE 0.9 %
10 VIAL (ML) INJECTION PRN
Status: CANCELLED | OUTPATIENT
Start: 2021-08-04

## 2021-08-04 RX ORDER — HEPARIN SODIUM (PORCINE) LOCK FLUSH IV SOLN 100 UNIT/ML 100 UNIT/ML
500 SOLUTION INTRAVENOUS PRN
Status: CANCELLED | OUTPATIENT
Start: 2021-08-04

## 2021-08-04 RX ORDER — SODIUM CHLORIDE 9 MG/ML
1000 INJECTION, SOLUTION INTRAVENOUS ONCE
Status: CANCELLED | OUTPATIENT
Start: 2021-08-04 | End: 2021-08-05

## 2021-08-04 RX ORDER — 0.9 % SODIUM CHLORIDE 0.9 %
VIAL (ML) INJECTION PRN
Status: CANCELLED | OUTPATIENT
Start: 2021-08-04

## 2021-08-04 RX ORDER — SODIUM CHLORIDE 9 MG/ML
1000 INJECTION, SOLUTION INTRAVENOUS ONCE
Status: COMPLETED | OUTPATIENT
Start: 2021-08-04 | End: 2021-08-04

## 2021-08-04 RX ORDER — 0.9 % SODIUM CHLORIDE 0.9 %
10 VIAL (ML) INJECTION PRN
Status: DISCONTINUED | OUTPATIENT
Start: 2021-08-04 | End: 2021-08-04

## 2021-08-04 RX ORDER — HEPARIN SODIUM (PORCINE) LOCK FLUSH IV SOLN 100 UNIT/ML 100 UNIT/ML
500 SOLUTION INTRAVENOUS PRN
Status: DISCONTINUED | OUTPATIENT
Start: 2021-08-04 | End: 2021-08-04 | Stop reason: HOSPADM

## 2021-08-04 RX ORDER — 0.9 % SODIUM CHLORIDE 0.9 %
3 VIAL (ML) INJECTION PRN
Status: CANCELLED | OUTPATIENT
Start: 2021-08-04

## 2021-08-04 RX ADMIN — SODIUM CHLORIDE 1000 ML: 9 INJECTION, SOLUTION INTRAVENOUS at 10:05

## 2021-08-04 RX ADMIN — HEPARIN SODIUM (PORCINE) LOCK FLUSH IV SOLN 100 UNIT/ML 500 UNITS: 100 SOLUTION at 13:18

## 2021-08-04 ASSESSMENT — PAIN SCALES - GENERAL
PAINLEVEL: NO PAIN
PAINLEVEL: NO PAIN

## 2021-08-04 ASSESSMENT — FIBROSIS 4 INDEX: FIB4 SCORE: 0.68

## 2021-08-04 NOTE — TELEPHONE ENCOUNTER
Called patient and let her know that infusion was able to get her in at 10:00 am on Friday. She agreed and verbalized understanding

## 2021-08-04 NOTE — NON-PROVIDER
NS bolus complete (stop time 1240), pt tolerated well.  New set of vitals taken.  Disconnected IV tubing from port & port was then flushed with 10 cc syringes of normal saline and brisk blood return noted, and blood was withdrawn for waste, and labs were drawn for a BMP.  Port was then flushed using 2 10 cc syringes of normal saline followed by 500 units of heparin delivered via 10 cc syringe with push-pause technique, deaccessed, and covered with Tegaderm (approx 1250). Pt was instructed to keep Tegaderm in place for 24 fours to reduce risk of infection. Pt tolerated procedure well and was assisted into w/c & escorted from clinic without s/s of distress down to the [facility] exit with standby assist into personal vehicle.

## 2021-08-04 NOTE — NON-PROVIDER
"Pt arrived accompanied by spouse via w/c.  Pt states she feels a little better than yesterday, but a \"little shaky\" once she got up & started moving around. Vitals taken, HR tachy at 112 (pt had just transferred from w/c to procedure chair.  Remains on RA with O2 sat 97% at rest, /73, afebrile (96.9 F).  Denies pain.  Only complaint is feeling shaky & SOB with mobility.  Flushed port (upper left chest) with 10 ml NS with push pause technique & brisk blood return.  Connected port to IV pump for bolus of 1L NS at rate of 400ml/hr (start time 1014).  Pt resting comfortably in chair, bell at bedside, spouse left momentarily to p/u a milkshake for pt since she had not eaten any breakfast yet this morning.  "

## 2021-08-05 ENCOUNTER — TELEPHONE (OUTPATIENT)
Dept: HEMATOLOGY ONCOLOGY | Facility: MEDICAL CENTER | Age: 81
End: 2021-08-05

## 2021-08-05 ENCOUNTER — SLEEP CENTER VISIT (OUTPATIENT)
Dept: SLEEP MEDICINE | Facility: MEDICAL CENTER | Age: 81
End: 2021-08-05
Payer: MEDICARE

## 2021-08-05 ENCOUNTER — HOSPITAL ENCOUNTER (OUTPATIENT)
Dept: RADIOLOGY | Facility: MEDICAL CENTER | Age: 81
End: 2021-08-05
Attending: PHYSICIAN ASSISTANT
Payer: MEDICARE

## 2021-08-05 ENCOUNTER — OFFICE VISIT (OUTPATIENT)
Dept: MEDICAL GROUP | Facility: MEDICAL CENTER | Age: 81
End: 2021-08-05
Payer: MEDICARE

## 2021-08-05 ENCOUNTER — APPOINTMENT (OUTPATIENT)
Dept: ONCOLOGY | Facility: MEDICAL CENTER | Age: 81
End: 2021-08-05
Attending: INTERNAL MEDICINE
Payer: MEDICARE

## 2021-08-05 VITALS
RESPIRATION RATE: 22 BRPM | TEMPERATURE: 96.2 F | OXYGEN SATURATION: 97 % | HEART RATE: 112 BPM | DIASTOLIC BLOOD PRESSURE: 62 MMHG | SYSTOLIC BLOOD PRESSURE: 114 MMHG | WEIGHT: 138 LBS | BODY MASS INDEX: 22.62 KG/M2

## 2021-08-05 VITALS
RESPIRATION RATE: 16 BRPM | BODY MASS INDEX: 22.99 KG/M2 | OXYGEN SATURATION: 96 % | WEIGHT: 138 LBS | DIASTOLIC BLOOD PRESSURE: 60 MMHG | HEIGHT: 65 IN | HEART RATE: 125 BPM | SYSTOLIC BLOOD PRESSURE: 114 MMHG

## 2021-08-05 DIAGNOSIS — J69.0 ASPIRATION PNEUMONIA, UNSPECIFIED ASPIRATION PNEUMONIA TYPE, UNSPECIFIED LATERALITY, UNSPECIFIED PART OF LUNG (HCC): ICD-10-CM

## 2021-08-05 DIAGNOSIS — R06.02 SHORTNESS OF BREATH: ICD-10-CM

## 2021-08-05 DIAGNOSIS — J69.0 ASPIRATION PNEUMONIA OF RIGHT LUNG, UNSPECIFIED ASPIRATION PNEUMONIA TYPE, UNSPECIFIED PART OF LUNG (HCC): ICD-10-CM

## 2021-08-05 DIAGNOSIS — Z86.711 HISTORY OF PULMONARY EMBOLUS (PE): ICD-10-CM

## 2021-08-05 DIAGNOSIS — R09.02 HYPOXIA: ICD-10-CM

## 2021-08-05 PROCEDURE — 71046 X-RAY EXAM CHEST 2 VIEWS: CPT

## 2021-08-05 PROCEDURE — 99495 TRANSJ CARE MGMT MOD F2F 14D: CPT | Performed by: STUDENT IN AN ORGANIZED HEALTH CARE EDUCATION/TRAINING PROGRAM

## 2021-08-05 PROCEDURE — 99214 OFFICE O/P EST MOD 30 MIN: CPT | Performed by: PHYSICIAN ASSISTANT

## 2021-08-05 RX ORDER — 0.9 % SODIUM CHLORIDE 0.9 %
10 VIAL (ML) INJECTION PRN
Status: CANCELLED | OUTPATIENT
Start: 2021-08-06

## 2021-08-05 RX ORDER — 0.9 % SODIUM CHLORIDE 0.9 %
3 VIAL (ML) INJECTION PRN
Status: CANCELLED | OUTPATIENT
Start: 2021-08-06

## 2021-08-05 RX ORDER — 0.9 % SODIUM CHLORIDE 0.9 %
VIAL (ML) INJECTION PRN
Status: CANCELLED | OUTPATIENT
Start: 2021-08-06

## 2021-08-05 RX ORDER — HEPARIN SODIUM (PORCINE) LOCK FLUSH IV SOLN 100 UNIT/ML 100 UNIT/ML
500 SOLUTION INTRAVENOUS PRN
Status: CANCELLED | OUTPATIENT
Start: 2021-08-06

## 2021-08-05 RX ORDER — SODIUM CHLORIDE 9 MG/ML
1000 INJECTION, SOLUTION INTRAVENOUS ONCE
Status: CANCELLED | OUTPATIENT
Start: 2021-08-06 | End: 2021-08-06

## 2021-08-05 ASSESSMENT — ENCOUNTER SYMPTOMS
WEIGHT LOSS: 1
SORE THROAT: 0
TREMORS: 0
PALPITATIONS: 1
SHORTNESS OF BREATH: 1
WHEEZING: 0
INSOMNIA: 0
ORTHOPNEA: 1
CHILLS: 0
HEARTBURN: 1
COUGH: 1
ROS GI COMMENTS: UPPER AND LOWER, NO DIFFICULTY SWALLOWING
HEADACHES: 0
FEVER: 0
SPUTUM PRODUCTION: 1
DIZZINESS: 0
SINUS PAIN: 0

## 2021-08-05 ASSESSMENT — FIBROSIS 4 INDEX
FIB4 SCORE: 0.68
FIB4 SCORE: 0.68

## 2021-08-05 NOTE — PROGRESS NOTES
"Subjective:     Karlene Walters is a 81 y.o. female who presents for Hospital Follow-up.    POST DISCHARGE CALL:  Discharge Date:7/23/2021   Date of Outreach Call: 7/26/2021 10:44 AM  Now that you're home, how are you doing? Fair  Comment:still getting \"horrible coughing spells\" but  improved  Do you have questions about your medications? No  Did you fill your medications? Yes  Do you have a follow-up appointment scheduled?No  Discharging Department: Oncology  Number of Attempts: 1  Current or previous attempts completed within two business days of discharge? Yes  Provided education regarding treatment plan, medication, self-management, ADLs? No  Has patient completed Advance Directive? If yes, advise them to bring to appointment. No  Care Manager phone number provided? Yes  Is there anything else I can help you with? No    HPI:   Recently hospitalized for suspected aspiration pneumonia.  Patient presented the emergency department for generalized weakness.  Patient has colorectal cancer with metastasis to the liver and is on chemotherapy every other week. She has been battling cancer for the last ten years, and Dr. Steele is her oncologist.     Patient was treated for suspected aspiration pneumonia. Pulmonology consulted who recommended antibiotic treatment and sputum sampling. CT scan showing nodular infiltrates in bilateral lung bases. Pulmonology recommend outpatient follow up with repeat CT scan in 6 weeks. Patient was treated with IV antibiotics with improvement in her symptoms. Sputum cultures with no growth. Patient on room air, feeling well. She will be discharged home with oral antibiotics as well as information for aspiration precautions. She is to follow up with pulmonology as well as oncology.    Patient had follow-up with oncology yesterday.  Patient scheduled with pulmonary today.  Patient states that she is feeling better overall but notes that she does feel increasingly fatigued and short of " breath.  Patient's oxygen saturations dropped with movement.  Discussed with patient that pulmonary likely complete 6-minute walk test and evaluate need for oxygen.    Current medicines (including reconciliation performed today)  Current Outpatient Medications   Medication Sig Dispense Refill   • STIOLTO RESPIMAT 2.5-2.5 MCG/ACT Aero Soln INHALE 2 PUFFS BY MOUTH ONCE DAILY 1 Each 5   • predniSONE (DELTASONE) 10 MG Tab Take 40mg x 4 days, then take 30mg x 4 days, then take 20mg x 4 days, then 10mg x 4 days with food, then discontinue. 40 tablet 0   • ondansetron (ZOFRAN) 4 MG Tab tablet Take 1 tablet by mouth every four hours as needed for Nausea/Vomiting (for nausea, vomiting). 30 tablet 6   • lidocaine-prilocaine (EMLA) 2.5-2.5 % Cream Apply to port one hour prior to access and cover with plastic wrap. 1 Each 3   • pantoprazole (PROTONIX) 40 MG Tablet Delayed Response Take 1 tablet by mouth every day. 90 tablet 3   • albuterol 108 (90 Base) MCG/ACT Aero Soln inhalation aerosol Inhale 2 Puffs every 6 hours as needed for Shortness of Breath. 1 Each 11   • potassium chloride ER (KLOR-CON) 10 MEQ tablet Take 1 tablet by mouth every day. 100 tablet 3   • rivaroxaban (XARELTO) 10 MG Tab tablet Take 1 Tab by mouth every day. 30 Tab 5   • cyanocobalamin (VITAMIN B-12) 500 MCG Tab Take 1,000 mcg by mouth every day.     • Multiple Vitamins-Minerals (CENTRUM SILVER PO) Take 1 Tab by mouth every day.     • Cholecalciferol (VITAMIN D3) 400 UNITS CAPS Take 1 Cap by mouth every day.     • loratadine (CLARITIN) 10 MG TABS Take 10 mg by mouth every day. Indications: Hayfever     • Hydrocod Polst-CPM Polst ER (TUSSIONEX PENNKINETIC ER) 10-8 MG/5ML Suspension Extended Release Take 5 mL by mouth every 12 hours as needed for Cough or Rhinitis for up to 28 days. (Patient not taking: Reported on 8/3/2021) 140 mL 0   • albuterol (PROVENTIL) 2.5mg/3ml Nebu Soln solution for nebulization Take 3 mL by nebulization every four hours as needed  for Shortness of Breath. 120 mL 11     No current facility-administered medications for this visit.       Allergies:   Oxaliplatin, Codeine, Pcn [penicillins], Sulfa drugs, Tape, and Amoxicillin    Social History     Tobacco Use   • Smoking status: Never Smoker   • Smokeless tobacco: Never Used   Vaping Use   • Vaping Use: Never used   Substance Use Topics   • Alcohol use: No   • Drug use: No       ROS:  Gen: no fevers/chills  ENT: no sore throat  Pulm: + sob, no cough  CV: no chest pain, no palpitations  GI: no nausea/vomiting, no diarrhea  : no dysuria  MSk: no myalgias  Skin: no rash  Neuro: no headaches, no numbness/tingling        Objective:     Exam:  /62   Pulse (!) 112   Temp (!) 35.7 °C (96.2 °F)   Resp (!) 22 Comment: SOB  Wt 62.6 kg (138 lb)   LMP  (LMP Unknown)   SpO2 97%   BMI 22.62 kg/m²  Body mass index is 22.62 kg/m².    Gen: Alert and oriented, No apparent distress.  Neck: Neck is supple without lymphadenopathy.  Lungs: Normal effort, CTA bilaterally, no wheezes, rhonchi, or rales  CV: Regular rate and rhythm. No murmurs, rubs, or gallops.  Ext: No clubbing, cyanosis, edema.      Assessment and Plan:   1. Shortness of breath  2. Aspiration pneumonia, unspecified aspiration pneumonia type, unspecified laterality, unspecified part of lung (HCC)  Patient's main concern today is continued shortness of breath.  Patient does have follow-up scheduled with pulmonary today.  Patient needs oxygen for exertion.  Continue to follow.    - Chart and discharge summary were reviewed.   - Hospitalization and results reviewed with patient.   - Medications reviewed including instructions regarding high risk medications, dosing and side effects.  - Recommended Services: No services needed at this time      Follow-up:Return if symptoms worsen or fail to improve.

## 2021-08-05 NOTE — TELEPHONE ENCOUNTER
Kennedi called and left message July 30 @ 459pm     Stating that they have talked to pt and Karlene declined walk test because she was heading to [place] on Friday night and would not be there on Monday for the test. She stated if we have any questions we could call [phone] ext [phone]

## 2021-08-05 NOTE — PATIENT INSTRUCTIONS
1-repeat cxr  2-currently on steroid taper   3-send new orders for oxygen  4-send new order for nebulizer  5-follow up in 4 weeks

## 2021-08-05 NOTE — TELEPHONE ENCOUNTER
Patient had follow-up visit with both pulmonary as well as primary care office today.  Contacted patient to see how she was doing.  She stated she did she was feeling little bit better.  She stated that pulmonary office is ordering her some oxygen, they have also reordered a nebulizer for her to have at home in case of emergencies.  She had a chest x-ray completed this afternoon which appears to be within normal limits.  She was informed to go ahead and finish her prednisone prescription that she was prescribed at discharge from hospital.  And the plan is that she will be following up with pulmonary in approximately 4 weeks.    Patient is requesting to proceed with the hydration appointment scheduled for tomorrow in preparation for her to travel home to [place] tomorrow afternoon.  Discussed with patient the importance of keeping us updated and contacting our office if needed immediately.  She is scheduled to follow-up with the clinic in approximately 2 weeks prior to possible restart of chemotherapy.  She is aware that she will have to clinically feel better overall before we restart any chemotherapy.

## 2021-08-05 NOTE — PROGRESS NOTES
CC:  Shortness of breath with activity    HPI:  Karlene Walters is a 81 y.o. year old female here today for follow-up on recent hospitalization 7/19/2021 x 4 days, aspiration pneumonia, chronic cough x 4 months, hypoxia.  She is a never smoker. Significant second hand smoke exposure, both childhood and adult, both at home and work. Last seen in clinic 6/21/2021 by Dr. Jannet Ventura.     Pertinent past history includes rectal cancer diagnosed in 2012 status post resection with recurrence in the liver for 2015 for which she has had multiple ablations to the right dome of the liver    Reviewed in clinic vitals including /60, , O2 sat of 96% and BMI of 22.96 kg/m². Patient is non labored at rest    Reviewed home medication regimen including Stiolto, prednisone, pantoprazole, albuterol, Xarelto.      Reviewed most recent imaging including MRI of the brain with and without contrast obtained 5/20/2021 demonstrating mild cerebral volume loss, mild chronic microvascular ischemic disease, no abnormal parenchymal or meningeal contrast enhancement to suggest metastasis.    Chest x-ray obtained 6/4/2021 demonstrated normal heart size, right lung base atelectasis similar to prior findings, left internal jugular line tip overlies the superior vena cava and upper right atrium similar to previous, no new infiltrates.    Chest CT obtained 7/20/2021 demonstrated linear nodular infiltrates bilateral lung bases, minimal scattered patchy groundglass pulmonary infiltrates, no lymphadenopathy noted, atherosclerosis and atherosclerotic coronary artery disease.    Pulmonary function testing obtained 6/21/2021 demonstrated FEV1 of 2.01 L or 101% predicted, FVC of 2.74 L or 105% predicted, FEV1/FVC ratio 73, residual volume 88% predicted, TLC 93% predicted, DLCO 84% predicted.     Per pulmonologist interpretation baseline spirometry showed normal airflows, lung volumes were within normal limits, diffusion capacity within normal  limits, normal PFT with normal flow volume loop.      Review of Systems   Constitutional: Positive for malaise/fatigue (moderate ) and weight loss. Negative for chills and fever.   HENT: Positive for hearing loss and nosebleeds (occasional ). Negative for congestion, sinus pain, sore throat and tinnitus.    Respiratory: Positive for cough (improved), sputum production (very little from clear to slightly green ) and shortness of breath (with exertion, talking ). Negative for wheezing.    Cardiovascular: Positive for palpitations, orthopnea (occasional ) and leg swelling (trace). Negative for chest pain.   Gastrointestinal: Positive for heartburn (controlled on meds).        Upper and lower, no difficulty swallowing    Neurological: Negative for dizziness, tremors and headaches.   Psychiatric/Behavioral: The patient does not have insomnia.        Past Medical History:   Diagnosis Date   • Adverse effect of anesthesia     elevated BP postop   • Anesthesia    • Arrhythmia     occasional   • Blood clotting disorder (HCC) 08/2015    bilat PE    • Blood transfusion 1957   • Breath shortness     pt reports d/t chemo treatment; no O2; with moderate exertion; no c/o at this time   • Cancer (HCC) 09/2012    rectal cancer,  Liver CA-2015   • CATARACT     removed b/l   • Chemotherapy-induced neutropenia (HCC) 9/28/2016   • Chickenpox     history of   • Colon cancer (HCC)    • Dental disorder     dentures UPPERS AND LOWERS   • Elevated alkaline phosphatase level 11/15/2017   • Emphysema of lung (HCC)     COPD   • Fall    • Sammarinese measles     history of   • Heart murmur     as a child   • Hemorrhagic disorder (HCC)     on Xarelto    • High cholesterol    • Hypercholesteremia    • Hypertension     no meds currently    • Ileostomy in place (HCC)    • Ileostomy in place (HCC)    • Indigestion    • Influenza     history of   • Lightheadedness 9/17/2015   • Mumps     history of   • Obstruction     ileostomy   • Other specified symptom  associated with female genital organs     HYSTERECTOMY 1993   • Pneumonia 05/2017    tx'd with antibx   • Pulmonary embolism (HCC)    • 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)    • Renal insufficiency 3/14/2016   • Restless legs 5/6/2020   • Routine general medical examination at a health care facility 3/14/2016    3/14/16   • Seasonal allergies    • Swelling of both ankles     Lasix PRN   • Tonsillitis        Past Surgical History:   Procedure Laterality Date   • HEPATIC ABLATION  5/28/2020    Procedure: ABLATION, LESION, LIVER-TRISEGMENTECTOMY, MICROWAVE ABLATION, INTRA OPERATIVE ULTRA SOUND, SIMPLE RESECTED / REPAIR OF DIAPHRAGM;  Surgeon: Kit West M.D.;  Location: SURGERY [facility];  Service: General   • HEPATIC ABLATION LAPAROSCOPIC  11/15/2018    Procedure: HEPATIC ABLATION LAPAROSCOPIC.- SEGMENT 7/8;  Surgeon: Kit West M.D.;  Location: SURGERY [facility];  Service: General   • COLONOSCOPY WITH BIOPSY  8/23/2015    Procedure: COLONOSCOPY WITH BIOPSY;  Surgeon: Wilbur Glasgow M.D.;  Location: ENDOSCOPY [facility];  Service:    • HEPATIC ABLATION LAPAROSCOPIC  7/6/2015    Procedure: HEPATIC ABLATION LAPAROSCOPIC.;  Surgeon: Kit West M.D.;  Location: SURGERY [facility];  Service:    • CATH PLACEMENT Left 7/6/2015    Procedure: CATH PLACEMENT CEPHALIC POWERPORT;  Surgeon: Kit West M.D.;  Location: SURGERY [facility];  Service:    • FISTULA IN ANO REPAIR  1/28/2015    Performed by Kolton Montgomery M.D. at [facility]   • PERINEAL PROCEDURE  1/28/2015    Performed by Kolton Montgomery M.D. at [facility]   • FISTULA IN ANO REPAIR  10/15/2014    Performed by Kolton Montgomery M.D. at [facility]   • PERINEAL PROCEDURE  10/15/2014    Performed by Kolton Montgomery M.D. at [facility]   • IRRIGATION & DEBRIDEMENT GENERAL  2/19/2014    Performed by Kolton Montgomery M.D. at [facility]  ORS   • FLAP GRAFT  2/19/2014    Performed by Kolton Montgomery M.D. at SURGERY [facility] ORS   • PERINEAL PROCEDURE  12/18/2013    Performed by Kolton Montgomery M.D. at SURGERY [facility]   • MYOCUTANEOUS FLAP  12/18/2013    Performed by Kolton Montgomery M.D. at SURGERY [facility]   • IRRIGATION & DEBRIDEMENT GENERAL  10/23/2013    Performed by Kolton Montgomery M.D. at SURGERY [facility] ORS   • FISTULA IN ANO REPAIR  9/4/2013    Performed by Kolton Montgomery M.D. at SURGERY [facility]   • FLAP ROTATION  9/4/2013    Performed by Kolton Montgomery M.D. at SURGERY [facility]   • RECOVERY  8/26/2013    Performed by Cath-Recovery Surgery at [facility] SAME DAY [facility]   • BIOPSY GENERAL  7/17/2013    Performed by Kolton Montgomery M.D. at SURGERY [facility]   • PROCTOSCOPY  7/17/2013    Performed by Kolton Montgomery M.D. at SURGERY [facility] ORS   • FISTULA IN ANO REPAIR  2/27/2013    Performed by Kolton Montgomery M.D. at SURGERY [facility] ORS   • SIGMOIDOSCOPY  2/27/2013    Performed by Kolton Montgomery M.D. at [facility]   • LAPAROSCOPY  10/8/2012    Performed by Kolton Montgomery M.D. at [facility]   • ILEO LOOP DIVERSION  10/8/2012    Performed by Kolton Montgomery M.D. at SURGERY [facility] ORS   • COLECTOMY  9/26/2012    Performed by Kolton Montgomery M.D. at [facility]   • COLONOSCOPY FLEX W/ENDO US  9/20/2012    Performed by Harshil Bolton M.D. at SURGERY [facility] ORS   • OTHER  2009    left eye torn retina repair   • CATARACT EXTRACTION WITH IOL  2004    bilateral   • ABDOMINAL HYSTERECTOMY TOTAL  1983   • CYST EXCISION  1972    ovarian   • COLON RESECTION     • EYE SURGERY      cataracts   • HYSTERECTOMY LAPAROSCOPY     • PB REMV 2ND CATARACT,CORN-SCLER SECTN     • TONSILLECTOMY         Family History   Problem Relation Age of Onset   • Heart Disease Mother    • Heart Failure Mother    • Hypertension Mother    • Hyperlipidemia Mother    • Cancer Mother    • Cancer Maternal  Aunt 60        breast ca   • Lung Disease Brother         COPD/Emphysema/Smoker   • Cancer Brother         prostate cancer   • Alcohol/Drug Brother         Alcohol   • Kidney Disease Father         renal failure       Social History     Socioeconomic History   • Marital status:      Spouse name: Not on file   • Number of children: Not on file   • Years of education: Not on file   • Highest education level: Not on file   Occupational History   • Not on file   Tobacco Use   • Smoking status: Never Smoker   • Smokeless tobacco: Never Used   Vaping Use   • Vaping Use: Never used   Substance and Sexual Activity   • Alcohol use: No   • Drug use: No   • Sexual activity: Never     Partners: Male     Birth control/protection: Post-Menopausal   Other Topics Concern   • Primary/coprimary nurse & associates Not Asked   • Family contact information Not Asked   • OK to release patient information to the following Not Asked   • Patient preferred routine/Privacy concerns Not Asked   • Patient likes and dislikes Not Asked   • Participating In Research Study Not Asked   • Miscellaneous Not Asked   Social History Narrative   • Not on file     Social Determinants of Health     Financial Resource Strain:    • Difficulty of Paying Living Expenses:    Food Insecurity:    • Worried About Running Out of Food in the Last Year:    • Ran Out of Food in the Last Year:    Transportation Needs:    • Lack of Transportation (Medical):    • Lack of Transportation (Non-Medical):    Physical Activity:    • Days of Exercise per Week:    • Minutes of Exercise per Session:    Stress:    • Feeling of Stress :    Social Connections:    • Frequency of Communication with Friends and Family:    • Frequency of Social Gatherings with Friends and Family:    • Attends [employer] Services:    • Active Member of Clubs or Organizations:    • Attends Club or Organization Meetings:    • Marital Status:    Intimate Partner Violence:    • Fear of Current or  "Ex-Partner:    • Emotionally Abused:    • Physically Abused:    • Sexually Abused:        Allergies as of 08/05/2021 - Reviewed 08/05/2021   Allergen Reaction Noted   • Oxaliplatin Anaphylaxis 10/27/2015   • Codeine  04/05/2011   • Pcn [penicillins] Itching 04/05/2011   • Sulfa drugs Itching 04/05/2011   • Tape Rash 09/04/2013   • Amoxicillin Rash 08/03/2021        @Vital signs for this encounter:  Vitals:    08/05/21 1250   Height: 1.651 m (5' 5\")   Weight: 62.6 kg (138 lb)   Weight % change since last entry.: 0 %   BP: 114/60   Pulse: (!) 125   BMI (Calculated): 22.96   Resp: 16       Current medications as of today   Current Outpatient Medications   Medication Sig Dispense Refill   • STIOLTO RESPIMAT 2.5-2.5 MCG/ACT Aero Soln INHALE 2 PUFFS BY MOUTH ONCE DAILY 1 Each 5   • predniSONE (DELTASONE) 10 MG Tab Take 40mg x 4 days, then take 30mg x 4 days, then take 20mg x 4 days, then 10mg x 4 days with food, then discontinue. 40 tablet 0   • ondansetron (ZOFRAN) 4 MG Tab tablet Take 1 tablet by mouth every four hours as needed for Nausea/Vomiting (for nausea, vomiting). 30 tablet 6   • lidocaine-prilocaine (EMLA) 2.5-2.5 % Cream Apply to port one hour prior to access and cover with plastic wrap. 1 Each 3   • pantoprazole (PROTONIX) 40 MG Tablet Delayed Response Take 1 tablet by mouth every day. 90 tablet 3   • albuterol 108 (90 Base) MCG/ACT Aero Soln inhalation aerosol Inhale 2 Puffs every 6 hours as needed for Shortness of Breath. 1 Each 11   • potassium chloride ER (KLOR-CON) 10 MEQ tablet Take 1 tablet by mouth every day. 100 tablet 3   • rivaroxaban (XARELTO) 10 MG Tab tablet Take 1 Tab by mouth every day. 30 Tab 5   • cyanocobalamin (VITAMIN B-12) 500 MCG Tab Take 1,000 mcg by mouth every day.     • Multiple Vitamins-Minerals (CENTRUM SILVER PO) Take 1 Tab by mouth every day.     • Cholecalciferol (VITAMIN D3) 400 UNITS CAPS Take 1 Cap by mouth every day.     • loratadine (CLARITIN) 10 MG TABS Take 10 mg by " mouth every day. Indications: Hayfever     • Hydrocod Polst-CPM Polst ER (TUSSIONEX PENNKINETIC ER) 10-8 MG/5ML Suspension Extended Release Take 5 mL by mouth every 12 hours as needed for Cough or Rhinitis for up to 28 days. (Patient not taking: Reported on 8/3/2021) 140 mL 0   • albuterol (PROVENTIL) 2.5mg/3ml Nebu Soln solution for nebulization Take 3 mL by nebulization every four hours as needed for Shortness of Breath. (Patient not taking: Reported on 8/3/2021) 120 mL 11     No current facility-administered medications for this visit.         Physical Exam:   Gen:           Alert and oriented, No apparent distress. Mood and affect appropriate, normal interaction with provider.  Eyes:          sclere white, conjunctive moist.  Hearing:     Grossly intact.  Dentition:    Upper or lower dentures  Oropharynx:   Tongue normal, posterior pharynx without erythema or exudate.  Neck:        Supple, trachea midline, no masses.  Respiratory Effort: No intercostal retractions or use of accessory muscles.   Lung Auscultation:      Decreased left base; right lower lobe rales, no rhonchi or wheezing.  CV:            Regular rate and rhythm. No edema. No murmurs, rubs or gallops.  Digits, Nails, Ext: No clubbing, cyanosis, petechiae, or nodes.   Skin:        No rashes, lesions or ulcers noted on exposed skin surfaces.                     Assessment:  1. Aspiration pneumonia of right lung, unspecified aspiration pneumonia type, unspecified part of lung (HCC)  DX-CHEST-2 VIEWS    DME Nebulizer   2. History of pulmonary embolus (PE)  DME O2 New Set Up   3. Shortness of breath  Multiple Oximetry   4. Hypoxia  DME O2 New Set Up       Immunizations:    Flu: 10/27/2020  Pneumovax 23: 9/5/2006  Prevnar 13: 2/2/2018  Moderna CoV2 vaccine: 2/16/2021, 1/19/2020    Plan:   81 y.o. year old female here today for follow-up on recent hospitalization 7/19/2021 x 4 days, aspiration pneumonia, chronic cough x4 months, hypoxia.  She is a never  smoker.  Significant second hand smoke exposure, both childhood and adult, both at home and work. Last seen in clinic 6/21/2021 by Dr. Jannet Ventura.     Pertinent past history includes rectal cancer diagnosed in 2012 status post resection with recurrence in the liver for 2015 for which she has had multiple ablations to the right dome of the liver.  She follows closely with oncology and is scheduled to resume chemo in 2 weeks.    Reviewed in clinic vitals including /60, , O2 sat of 96% and BMI of 22.96 kg/m². Patient is non labored at rest Patient did not previously require oxygen however had significant desaturation with minimal exertion in clinic.  Also demonstrated 2 days ago, able to maintain adequate saturations at rest but requires oxygen for any activity.    Reviewed home medication regimen including Stiolto, prednisone, pantoprazole, albuterol, Xarelto.      Aspiration pneumonia: Currently on prednisone taper.  Follow-up imaging obtained following appointment demonstrated no infiltrates or consolidations, left chest port with tip in the right atrium similar to prior, no pleural effusions appreciated. Nebulizer ordered.      History pulmonary embolus: Patient remains on Xarelto.    Shortness of breath/hypoxia: Ambulating multi ox performed in clinic, patient requires O2 at 2 L with exertion.  Contacted local [employer], [employer] in [place] will deliver O2 on patient arrival back in [place].  Her  will be doing the driving and her oxygen saturation is okay at rest.    Had Covid vaccine x2, follow-up in 4 weeks to coordinate with oncology appointments.    This dictation was created using voice recognition software. The accuracy of the dictation is limited to the abilities of the software. I expect there may be some errors of grammar and possibly content.

## 2021-08-06 ENCOUNTER — OUTPATIENT INFUSION SERVICES (OUTPATIENT)
Dept: ONCOLOGY | Facility: MEDICAL CENTER | Age: 81
End: 2021-08-06
Attending: INTERNAL MEDICINE
Payer: MEDICARE

## 2021-08-06 VITALS
SYSTOLIC BLOOD PRESSURE: 139 MMHG | DIASTOLIC BLOOD PRESSURE: 69 MMHG | HEIGHT: 64 IN | TEMPERATURE: 97.8 F | BODY MASS INDEX: 23.52 KG/M2 | WEIGHT: 137.79 LBS | RESPIRATION RATE: 17 BRPM | OXYGEN SATURATION: 99 % | HEART RATE: 104 BPM

## 2021-08-06 DIAGNOSIS — Z95.828 PORT-A-CATH IN PLACE: ICD-10-CM

## 2021-08-06 DIAGNOSIS — C20 MALIGNANT NEOPLASM OF RECTUM (HCC): ICD-10-CM

## 2021-08-06 DIAGNOSIS — N17.9 ACUTE RENAL FAILURE, UNSPECIFIED ACUTE RENAL FAILURE TYPE (HCC): ICD-10-CM

## 2021-08-06 DIAGNOSIS — C18.9 METASTATIC COLON CANCER IN FEMALE: ICD-10-CM

## 2021-08-06 PROCEDURE — A4212 NON CORING NEEDLE OR STYLET: HCPCS

## 2021-08-06 PROCEDURE — 96360 HYDRATION IV INFUSION INIT: CPT

## 2021-08-06 PROCEDURE — 700105 HCHG RX REV CODE 258: Performed by: NURSE PRACTITIONER

## 2021-08-06 PROCEDURE — 700111 HCHG RX REV CODE 636 W/ 250 OVERRIDE (IP): Performed by: NURSE PRACTITIONER

## 2021-08-06 PROCEDURE — 96361 HYDRATE IV INFUSION ADD-ON: CPT

## 2021-08-06 RX ORDER — HEPARIN SODIUM (PORCINE) LOCK FLUSH IV SOLN 100 UNIT/ML 100 UNIT/ML
500 SOLUTION INTRAVENOUS PRN
Status: DISCONTINUED | OUTPATIENT
Start: 2021-08-06 | End: 2021-08-06 | Stop reason: HOSPADM

## 2021-08-06 RX ORDER — SODIUM CHLORIDE 9 MG/ML
1000 INJECTION, SOLUTION INTRAVENOUS ONCE
Status: COMPLETED | OUTPATIENT
Start: 2021-08-06 | End: 2021-08-06

## 2021-08-06 RX ADMIN — HEPARIN 500 UNITS: 100 SYRINGE at 12:51

## 2021-08-06 RX ADMIN — SODIUM CHLORIDE 1000 ML: 9 INJECTION, SOLUTION INTRAVENOUS at 10:14

## 2021-08-06 ASSESSMENT — FIBROSIS 4 INDEX: FIB4 SCORE: 0.68

## 2021-08-06 NOTE — PROGRESS NOTES
Patient arrived ambulatory to [facility] for hydration.  Port accessed using sterile technique, brisk blood return noted.  1L NS given, patient tolerated well.  Port flushed, heparin instilled, and de-accessed per protocol.  Gauze/tape applied.  Confirmed next appointment and patient ambulated out of clinic in no apparent distress.

## 2021-08-06 NOTE — PROCEDURES
Multi-Ox Readings  Multi Ox #1 Room air   O2 sat % at rest 94   O2 sat % on exertion 86   O2 sat average on exertion     Multi Ox #2 2 LPM   O2 sat % at rest 98   O2 sat % on exertion 92   O2 sat average on exertion       Oxygen Use     Oxygen Frequency     Duration of need     Is the patient mobile within the home?     CPAP Use?     BIPAP Use?     Servo Titration

## 2021-08-12 ENCOUNTER — PATIENT MESSAGE (OUTPATIENT)
Dept: SLEEP MEDICINE | Facility: MEDICAL CENTER | Age: 81
End: 2021-08-12

## 2021-08-12 DIAGNOSIS — R09.02 HYPOXIA: ICD-10-CM

## 2021-08-16 ENCOUNTER — PATIENT MESSAGE (OUTPATIENT)
Dept: SLEEP MEDICINE | Facility: MEDICAL CENTER | Age: 81
End: 2021-08-16

## 2021-08-16 RX ORDER — 0.9 % SODIUM CHLORIDE 0.9 %
10 VIAL (ML) INJECTION PRN
Status: CANCELLED | OUTPATIENT
Start: 2021-08-16

## 2021-08-16 RX ORDER — HEPARIN SODIUM (PORCINE) LOCK FLUSH IV SOLN 100 UNIT/ML 100 UNIT/ML
500 SOLUTION INTRAVENOUS PRN
Status: CANCELLED | OUTPATIENT
Start: 2021-08-16

## 2021-08-16 RX ORDER — 0.9 % SODIUM CHLORIDE 0.9 %
3 VIAL (ML) INJECTION PRN
Status: CANCELLED | OUTPATIENT
Start: 2021-08-16

## 2021-08-16 RX ORDER — 0.9 % SODIUM CHLORIDE 0.9 %
VIAL (ML) INJECTION PRN
Status: CANCELLED | OUTPATIENT
Start: 2021-08-16

## 2021-08-17 ENCOUNTER — OUTPATIENT INFUSION SERVICES (OUTPATIENT)
Dept: ONCOLOGY | Facility: MEDICAL CENTER | Age: 81
End: 2021-08-17
Attending: INTERNAL MEDICINE
Payer: MEDICARE

## 2021-08-17 ENCOUNTER — OFFICE VISIT (OUTPATIENT)
Dept: HEMATOLOGY ONCOLOGY | Facility: MEDICAL CENTER | Age: 81
End: 2021-08-17
Payer: MEDICARE

## 2021-08-17 VITALS
WEIGHT: 139.11 LBS | OXYGEN SATURATION: 99 % | HEART RATE: 106 BPM | TEMPERATURE: 97.5 F | HEIGHT: 64 IN | BODY MASS INDEX: 23.75 KG/M2 | RESPIRATION RATE: 18 BRPM | DIASTOLIC BLOOD PRESSURE: 72 MMHG | SYSTOLIC BLOOD PRESSURE: 143 MMHG

## 2021-08-17 VITALS
HEART RATE: 108 BPM | RESPIRATION RATE: 18 BRPM | BODY MASS INDEX: 23.14 KG/M2 | SYSTOLIC BLOOD PRESSURE: 128 MMHG | TEMPERATURE: 97.7 F | HEIGHT: 65 IN | DIASTOLIC BLOOD PRESSURE: 76 MMHG | OXYGEN SATURATION: 95 % | WEIGHT: 138.89 LBS

## 2021-08-17 VITALS
WEIGHT: 139.11 LBS | HEART RATE: 106 BPM | DIASTOLIC BLOOD PRESSURE: 72 MMHG | OXYGEN SATURATION: 99 % | TEMPERATURE: 97.5 F | BODY MASS INDEX: 23.75 KG/M2 | RESPIRATION RATE: 18 BRPM | HEIGHT: 64 IN | SYSTOLIC BLOOD PRESSURE: 143 MMHG

## 2021-08-17 DIAGNOSIS — C20 MALIGNANT NEOPLASM OF RECTUM (HCC): ICD-10-CM

## 2021-08-17 DIAGNOSIS — C18.9 METASTATIC COLON CANCER IN FEMALE: ICD-10-CM

## 2021-08-17 DIAGNOSIS — Z79.01 CURRENT USE OF LONG TERM ANTICOAGULATION: ICD-10-CM

## 2021-08-17 DIAGNOSIS — Z86.711 HISTORY OF PULMONARY EMBOLUS (PE): ICD-10-CM

## 2021-08-17 DIAGNOSIS — C78.7 SECONDARY MALIGNANT NEOPLASM OF LIVER (HCC): ICD-10-CM

## 2021-08-17 DIAGNOSIS — R06.02 SOB (SHORTNESS OF BREATH): ICD-10-CM

## 2021-08-17 DIAGNOSIS — R05.9 COUGH: ICD-10-CM

## 2021-08-17 LAB
ALBUMIN SERPL BCP-MCNC: 3.2 G/DL (ref 3.2–4.9)
ALBUMIN/GLOB SERPL: 1.3 G/DL
ALP SERPL-CCNC: 150 U/L (ref 30–99)
ALT SERPL-CCNC: 18 U/L (ref 2–50)
ANION GAP SERPL CALC-SCNC: 11 MMOL/L (ref 7–16)
APPEARANCE UR: CLEAR
AST SERPL-CCNC: 14 U/L (ref 12–45)
BASOPHILS # BLD AUTO: 0.8 % (ref 0–1.8)
BASOPHILS # BLD: 0.1 K/UL (ref 0–0.12)
BILIRUB SERPL-MCNC: 0.3 MG/DL (ref 0.1–1.5)
BUN SERPL-MCNC: 12 MG/DL (ref 8–22)
CALCIUM SERPL-MCNC: 8.7 MG/DL (ref 8.5–10.5)
CEA SERPL-MCNC: 2.5 NG/ML (ref 0–3)
CHLORIDE SERPL-SCNC: 105 MMOL/L (ref 96–112)
CO2 SERPL-SCNC: 24 MMOL/L (ref 20–33)
COLOR UR AUTO: YELLOW
CREAT SERPL-MCNC: 0.74 MG/DL (ref 0.5–1.4)
EOSINOPHIL # BLD AUTO: 1.46 K/UL (ref 0–0.51)
EOSINOPHIL NFR BLD: 11.4 % (ref 0–6.9)
ERYTHROCYTE [DISTWIDTH] IN BLOOD BY AUTOMATED COUNT: 63.8 FL (ref 35.9–50)
GLOBULIN SER CALC-MCNC: 2.5 G/DL (ref 1.9–3.5)
GLUCOSE SERPL-MCNC: 85 MG/DL (ref 65–99)
GLUCOSE UR QL STRIP.AUTO: NEGATIVE MG/DL
HCT VFR BLD AUTO: 36.2 % (ref 37–47)
HGB BLD-MCNC: 10.9 G/DL (ref 12–16)
IMM GRANULOCYTES # BLD AUTO: 0.19 K/UL (ref 0–0.11)
IMM GRANULOCYTES NFR BLD AUTO: 1.5 % (ref 0–0.9)
KETONES UR QL STRIP.AUTO: NEGATIVE MG/DL
LEUKOCYTE ESTERASE UR QL STRIP.AUTO: ABNORMAL
LYMPHOCYTES # BLD AUTO: 1.54 K/UL (ref 1–4.8)
LYMPHOCYTES NFR BLD: 12 % (ref 22–41)
MCH RBC QN AUTO: 26.4 PG (ref 27–33)
MCHC RBC AUTO-ENTMCNC: 30.1 G/DL (ref 33.6–35)
MCV RBC AUTO: 87.7 FL (ref 81.4–97.8)
MONOCYTES # BLD AUTO: 0.94 K/UL (ref 0–0.85)
MONOCYTES NFR BLD AUTO: 7.3 % (ref 0–13.4)
NEUTROPHILS # BLD AUTO: 8.62 K/UL (ref 2–7.15)
NEUTROPHILS NFR BLD: 67 % (ref 44–72)
NITRITE UR QL STRIP.AUTO: NEGATIVE
NRBC # BLD AUTO: 0 K/UL
NRBC BLD-RTO: 0 /100 WBC
PH UR STRIP.AUTO: 6 [PH] (ref 5–8)
PLATELET # BLD AUTO: 265 K/UL (ref 164–446)
PMV BLD AUTO: 10.5 FL (ref 9–12.9)
POTASSIUM SERPL-SCNC: 3.5 MMOL/L (ref 3.6–5.5)
PROT SERPL-MCNC: 5.7 G/DL (ref 6–8.2)
PROT UR QL STRIP: NEGATIVE MG/DL
RBC # BLD AUTO: 4.13 M/UL (ref 4.2–5.4)
RBC UR QL AUTO: NEGATIVE
SODIUM SERPL-SCNC: 140 MMOL/L (ref 135–145)
SP GR UR STRIP.AUTO: 1.02 (ref 1–1.03)
WBC # BLD AUTO: 12.9 K/UL (ref 4.8–10.8)

## 2021-08-17 PROCEDURE — 700105 HCHG RX REV CODE 258: Performed by: INTERNAL MEDICINE

## 2021-08-17 PROCEDURE — 99214 OFFICE O/P EST MOD 30 MIN: CPT | Performed by: NURSE PRACTITIONER

## 2021-08-17 PROCEDURE — 700111 HCHG RX REV CODE 636 W/ 250 OVERRIDE (IP): Performed by: INTERNAL MEDICINE

## 2021-08-17 PROCEDURE — A4212 NON CORING NEEDLE OR STYLET: HCPCS

## 2021-08-17 PROCEDURE — 81002 URINALYSIS NONAUTO W/O SCOPE: CPT

## 2021-08-17 PROCEDURE — 82378 CARCINOEMBRYONIC ANTIGEN: CPT

## 2021-08-17 PROCEDURE — 85025 COMPLETE CBC W/AUTO DIFF WBC: CPT

## 2021-08-17 PROCEDURE — 96417 CHEMO IV INFUS EACH ADDL SEQ: CPT

## 2021-08-17 PROCEDURE — 96413 CHEMO IV INFUSION 1 HR: CPT

## 2021-08-17 PROCEDURE — 80053 COMPREHEN METABOLIC PANEL: CPT

## 2021-08-17 PROCEDURE — 96375 TX/PRO/DX INJ NEW DRUG ADDON: CPT

## 2021-08-17 PROCEDURE — 96415 CHEMO IV INFUSION ADDL HR: CPT

## 2021-08-17 RX ORDER — HEPARIN SODIUM (PORCINE) LOCK FLUSH IV SOLN 100 UNIT/ML 100 UNIT/ML
500 SOLUTION INTRAVENOUS PRN
Status: DISCONTINUED | OUTPATIENT
Start: 2021-08-17 | End: 2021-08-17 | Stop reason: HOSPADM

## 2021-08-17 RX ADMIN — ONDANSETRON 16 MG: 2 INJECTION INTRAMUSCULAR; INTRAVENOUS at 09:30

## 2021-08-17 RX ADMIN — SODIUM CHLORIDE 300 MG: 9 INJECTION, SOLUTION INTRAVENOUS at 12:24

## 2021-08-17 RX ADMIN — IRINOTECAN HYDROCHLORIDE 253.6 MG: 20 INJECTION, SOLUTION INTRAVENOUS at 10:05

## 2021-08-17 RX ADMIN — ATROPINE SULFATE 0.5 MG: 1 INJECTION, SOLUTION INTRAMUSCULAR; INTRAVENOUS; SUBCUTANEOUS at 10:01

## 2021-08-17 RX ADMIN — HEPARIN 500 UNITS: 100 SYRINGE at 13:56

## 2021-08-17 RX ADMIN — DEXAMETHASONE SODIUM PHOSPHATE 12 MG: 4 INJECTION, SOLUTION INTRA-ARTICULAR; INTRALESIONAL; INTRAMUSCULAR; INTRAVENOUS; SOFT TISSUE at 09:20

## 2021-08-17 ASSESSMENT — ENCOUNTER SYMPTOMS
SHORTNESS OF BREATH: 1
NAUSEA: 0
CONSTIPATION: 0
SPUTUM PRODUCTION: 1
DIAPHORESIS: 0
FEVER: 0
DIARRHEA: 0
COUGH: 1
DIZZINESS: 0
CHILLS: 0
PALPITATIONS: 0
VOMITING: 0
WEIGHT LOSS: 0
TINGLING: 1

## 2021-08-17 ASSESSMENT — PAIN SCALES - GENERAL: PAINLEVEL: NO PAIN

## 2021-08-17 ASSESSMENT — FIBROSIS 4 INDEX
FIB4 SCORE: 1.01
FIB4 SCORE: 0.68
FIB4 SCORE: 0.68

## 2021-08-17 NOTE — PROGRESS NOTES
Chemotherapy Verification - PRIMARY RN  C2 D1      Height = 1.62 m  Weight = 63.1 kg  BSA = 1.69 m2       Medication: irinotecan   Dose: 150 mg/m2  Calculated Dose: 253.5 mg                             (In mg/m2, AUC, mg/kg)     Medication: bevacizumab-bvzr  Dose: 5 mg/kg  Calculated Dose: 315.5 mg                             (In mg/m2, AUC, mg/kg)      I confirm this process was performed independently with the BSA and all final chemotherapy dosing calculations congruent.  Any discrepancies of 10% or greater have been addressed with the chemotherapy pharmacist. The resolution of the discrepancy has been documented in the EPIC progress notes.

## 2021-08-17 NOTE — PROGRESS NOTES
"Pharmacy Chemotherapy Verification  Patient Name: Karlene Walters      Dx:  Met Colon CA      Protocol: Irinotecan + bevacizumab *no prior hx of bevacizumab tx*  Cycle 2    Previous treatment: 7/6/21    Irinotecan 180 mg/m2 IV over 90 minutes Day 1   C1: dose reduced to 150 mg/m2 d/t pretreatment & anticipated tolerance  bevacizumab 5 mg/kg IV over 60 minutes C1D1, then 30 min following   14 day cycles until disease progression or unacceptable toxicity  *Dosing Reference*     NCCN Guidelines for Colon Cancer v2.2021    Allergies:  Oxaliplatin; Codeine; Pcn [penicillins]; Sulfa drugs; and Tape     /72 Comment: Transfered over from this morning.  Pulse (!) 106 Comment: Transfered over from this morning.  Temp 36.4 °C (97.5 °F) (Temporal) Comment: Transfered over from this morning. Comment (Src): Transfered over from this morning.  Resp 18 Comment: Transfered over from this morning.  Ht 1.62 m (5' 3.78\") Comment: Transfered over from this morning.  Wt 63.1 kg (139 lb 1.8 oz) Comment: Transfered over from this morning.  LMP  (LMP Unknown)   SpO2 99% Comment: Transfered over from this morning.  BMI 24.04 kg/m²  Body surface area is 1.69 meters squared.      Labs: 8/17/21   Meet treatment parameters  BP ok  Urine protein negative    Irinotecan 150 mg/m² x 1.69 m² = 253.5 mg   Ok to treat with final dose = 253.6 mg   Final [conc] ~ 0.49 mg/mL     Bevacizumab-bvzr 5 mg/kg x 63.1 kg = 315.5 mg   Ok to treat with final dose = 300 mg      "

## 2021-08-17 NOTE — PROGRESS NOTES
Chemotherapy Verification - SECONDARY RN     C2 D1    Height = 162 cm  Weight = 63.1 kg  BSA = 1.69 m^2       Medication: Irinotecan  Dose: 150 mg/m^2  Calculated Dose: 253.5 mg                             (In mg/m2, AUC, mg/kg)     Medication: Bevacizumab-bvzr (Zirabev)  Dose: 5 mg/kg  Calculated Dose: 315.5 mg                             (In mg/m2, AUC, mg/kg)    I confirm that this process was performed independently.

## 2021-08-17 NOTE — PROGRESS NOTES
Pt presents to \[facility]\"" for pre-chemo labs. L chest port accessed using sterile technique; brisk blood return observed and pt tolerated well. Labs drawn per orders. Brisk blood return observed from port before flushed and saline locked; pt to return to clinic in an hour for chemo. Next appointment confirmed and education provided. Pt discharged to self care with all personal belongings and in NAD.

## 2021-08-17 NOTE — PROGRESS NOTES
Subjective     Karlene Walters is a 81 y.o. female who presents with Cancer (EST/Prechemo ) for metastatic rectal carcinoma.         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  She is scheduled to proceed with cycle 2 of Irinotecan/Avastin today (delayed x4 weeks).  She presents accompanied by her  for today's visit.     Cancer History  Patient with a long history of rectal carcinoma initially diagnosed in 2012.  She was found to have metastatic disease to liver and lung in 2015.  She is status post liver ablation as well as has received Y 90 in the past.  She was on XELOX initially but had allergic reaction to oxaliplatin (last dose 7/28/15) and was switched to single agent 5-FU. Patient initially on 5-FU at 2400 mg/m2 with Leucovorin and 5-FU push starting on 10/27/15, followed by 20% dose reduction to 1920 mg/m2 for cycle 18 (7/5/16), followed by deletion of Leucovorin and 5-FU push on 10/31/17. In January 2020 patient was changed to every 3-week cycle at cycle #99 ([place] #91) and then changed to every 4-week interval in May 2020.  It was at that time there was noted to have growth of disease within the liver and was seen by Dr. West, surgeon.  Patient did undergo surgery in hopes of resection of the tumor but it was felt to be too close to the hilum and therefore she did undergo ablation instead on 5/28/20.  She has been back on single agent 5-FU at every 2-week interval, restarted on 6/16/20. Last CT 10/06/20 showed the tumor ablation cavities within the right lobe of the liver have decreased in size since previous examination.  There is a 1.5 x 3.1 cm focal area of enhancement adjacent to the ablation cavity that appears to have increased concerning for possible tumor recurrence.  Based on that finding she was sent for an MRI for further evaluation on 10/8/2020 which confirmed that the appearance of the masses noted in the liver did not have an  appearance for suspicious finding for recurrent metastatic disease.  Patient continued with MRI follow up imaging every 3 months, most recent on 4/5/21 showed stable appearance of the liver with distortion of the right lobe consistent with prior partial hepatectomy and ablation.  No evidence of recurrent tumor.  Enhancing soft tissue nodule in the right cardiophrenic angle again seen and unchanged consistent with adenopathy, measuring 17 mm in size.      March 2021 patient had experienced significant fatigue and shortness of breath.  This had progressively worsened. She was hospitalized in March. She was seen by cardiology and echocardiogram completed which showed an ejection fraction of 65%.  CT chest with contrast completed in March was stable. Continued and progressively worsening SOB, repeat CT chest 5/18/2021. Patient was seen by pulmonary 5/19/2021 and VQ scan negative. MRI brain completed on 5/20/2021 showed no evidence of metastatic disease.     Dr. Steele feels patient has received enough 5FU and was transitioned to Irinotecan/Avastin, first dose 7/6/21. She received 1 cycle and then hospitalized and treated for pneumonia. Patient continued with severe respiratory distress and cycle 2 was delayed until resolution of symptoms.      Interval History  Patient being seen today for evaluation to proceed with cycle 2 of irinotecan/Avastin.  Clinically patient appears to have improved significantly.  She does still have fatigue, cough and shortness of breath however this has significantly improved.  She has been more active at home.  She denies any fevers or chills.  She denies any chest pain, heart palpitations, nausea, vomiting, diarrhea constipation.  Her output via her ostomy is normal per patient's routine.  She denies any pain or burning with urination.  She does have peripheral neuropathy in her feet and has noticed slightly worsening.  She does not have significant dizziness however when she stands up too  "quickly she may feel a little unsteady.    Allergies   Allergen Reactions   • Oxaliplatin Anaphylaxis   • Codeine      \"gets drunk\"   • Pcn [Penicillins] Itching     itching   • Sulfa Drugs Itching     itching   • Tape Rash     PAPER TAPE OK   • Amoxicillin Rash     Rash all over body     Current Outpatient Medications on File Prior to Visit   Medication Sig Dispense Refill   • STIOLTO RESPIMAT 2.5-2.5 MCG/ACT Aero Soln INHALE 2 PUFFS BY MOUTH ONCE DAILY 1 Each 5   • predniSONE (DELTASONE) 10 MG Tab Take 40mg x 4 days, then take 30mg x 4 days, then take 20mg x 4 days, then 10mg x 4 days with food, then discontinue. 40 tablet 0   • ondansetron (ZOFRAN) 4 MG Tab tablet Take 1 tablet by mouth every four hours as needed for Nausea/Vomiting (for nausea, vomiting). 30 tablet 6   • lidocaine-prilocaine (EMLA) 2.5-2.5 % Cream Apply to port one hour prior to access and cover with plastic wrap. 1 Each 3   • pantoprazole (PROTONIX) 40 MG Tablet Delayed Response Take 1 tablet by mouth every day. 90 tablet 3   • albuterol (PROVENTIL) 2.5mg/3ml Nebu Soln solution for nebulization Take 3 mL by nebulization every four hours as needed for Shortness of Breath. 120 mL 11   • albuterol 108 (90 Base) MCG/ACT Aero Soln inhalation aerosol Inhale 2 Puffs every 6 hours as needed for Shortness of Breath. 1 Each 11   • potassium chloride ER (KLOR-CON) 10 MEQ tablet Take 1 tablet by mouth every day. 100 tablet 3   • rivaroxaban (XARELTO) 10 MG Tab tablet Take 1 Tab by mouth every day. 30 Tab 5   • cyanocobalamin (VITAMIN B-12) 500 MCG Tab Take 1,000 mcg by mouth every day.     • Multiple Vitamins-Minerals (CENTRUM SILVER PO) Take 1 Tab by mouth every day.     • Cholecalciferol (VITAMIN D3) 400 UNITS CAPS Take 1 Cap by mouth every day.     • loratadine (CLARITIN) 10 MG TABS Take 10 mg by mouth every day. Indications: Hayfever       Current Facility-Administered Medications on File Prior to Visit   Medication Dose Route Frequency Provider Last " "Rate Last Admin   • heparin lock flush 100 unit/mL injection 500 Units  500 Units Intracatheter PRN Lucho Steele M.D.       • atropine injection 0.5 mg  0.5 mg Intravenous PRN Lucho Steele M.D.   0.5 mg at 08/17/21 1001   • irinotecan (CAMPTOSAR) 253.6 mg in dextrose 5% 500 mL Chemo Infusion  150 mg/m2 Intravenous Once Lucho Steele M.D. 342 mL/hr at 08/17/21 1005 253.6 mg at 08/17/21 1005   • bevacizumab-bvzr (Zirabev) 300 mg in  mL Chemo Infusion  5 mg/kg Intravenous Once Lucho Steele M.D.             Review of Systems   Constitutional: Positive for malaise/fatigue (improving). Negative for chills, diaphoresis, fever and weight loss.   Respiratory: Positive for cough, sputum production and shortness of breath.    Cardiovascular: Negative for chest pain and palpitations.   Gastrointestinal: Negative for constipation, diarrhea, nausea and vomiting.   Genitourinary: Negative for dysuria.   Neurological: Positive for tingling (in her feet). Negative for dizziness (when standing up too quickly).         Objective     /76   Pulse (!) 108   Temp 36.5 °C (97.7 °F) (Temporal)   Resp 18   Ht 1.651 m (5' 5\")   Wt 63 kg (138 lb 14.2 oz)   LMP  (LMP Unknown)   SpO2 95%   BMI 23.11 kg/m²      Physical Exam  Vitals reviewed.   Constitutional:       General: She is not in acute distress.     Appearance: She is not diaphoretic.   Cardiovascular:      Rate and Rhythm: Regular rhythm. Tachycardia present.      Pulses: Normal pulses.      Heart sounds: Normal heart sounds. No murmur heard.   No friction rub. No gallop.    Pulmonary:      Effort: No respiratory distress.      Breath sounds: Normal breath sounds. No wheezing.      Comments: SOB noted  Abdominal:      General: Bowel sounds are normal. There is no distension.      Palpations: Abdomen is soft.      Tenderness: There is no abdominal tenderness.   Musculoskeletal:         General: No swelling or tenderness. Normal range of motion.   Skin:     " General: Skin is warm and dry.   Neurological:      Mental Status: She is alert and oriented to person, place, and time.   Psychiatric:         Mood and Affect: Mood normal.         Behavior: Behavior normal.         Results for KANG HALL (MRN [id number])    Ref. Range 8/17/2021 07:34   WBC Latest Ref Range: 4.8 - 10.8 K/uL 12.9 (H)   RBC Latest Ref Range: 4.20 - 5.40 M/uL 4.13 (L)   Hemoglobin Latest Ref Range: 12.0 - 16.0 g/dL 10.9 (L)   Hematocrit Latest Ref Range: 37.0 - 47.0 % 36.2 (L)   MCV Latest Ref Range: 81.4 - 97.8 fL 87.7   MCH Latest Ref Range: 27.0 - 33.0 pg 26.4 (L)   MCHC Latest Ref Range: 33.6 - 35.0 g/dL 30.1 (L)   RDW Latest Ref Range: 35.9 - 50.0 fL 63.8 (H)   Platelet Count Latest Ref Range: 164 - 446 K/uL 265   MPV Latest Ref Range: 9.0 - 12.9 fL 10.5   Neutrophils-Polys Latest Ref Range: 44.00 - 72.00 % 67.00   Neutrophils (Absolute) Latest Ref Range: 2.00 - 7.15 K/uL 8.62 (H)   Lymphocytes Latest Ref Range: 22.00 - 41.00 % 12.00 (L)   Lymphs (Absolute) Latest Ref Range: 1.00 - 4.80 K/uL 1.54   Monocytes Latest Ref Range: 0.00 - 13.40 % 7.30   Monos (Absolute) Latest Ref Range: 0.00 - 0.85 K/uL 0.94 (H)   Eosinophils Latest Ref Range: 0.00 - 6.90 % 11.40 (H)   Eos (Absolute) Latest Ref Range: 0.00 - 0.51 K/uL 1.46 (H)   Basophils Latest Ref Range: 0.00 - 1.80 % 0.80   Baso (Absolute) Latest Ref Range: 0.00 - 0.12 K/uL 0.10   Immature Granulocytes Latest Ref Range: 0.00 - 0.90 % 1.50 (H)   Immature Granulocytes (abs) Latest Ref Range: 0.00 - 0.11 K/uL 0.19 (H)   Nucleated RBC Latest Units: /100 WBC 0.00   NRBC (Absolute) Latest Units: K/uL 0.00   Sodium Latest Ref Range: 135 - 145 mmol/L 140   Potassium Latest Ref Range: 3.6 - 5.5 mmol/L 3.5 (L)   Chloride Latest Ref Range: 96 - 112 mmol/L 105   Co2 Latest Ref Range: 20 - 33 mmol/L 24   Anion Gap Latest Ref Range: 7.0 - 16.0  11.0   Glucose Latest Ref Range: 65 - 99 mg/dL 85   Bun Latest Ref Range: 8 - 22 mg/dL 12   Creatinine Latest Ref  Range: 0.50 - 1.40 mg/dL 0.74   GFR If  Latest Ref Range: >60 mL/min/1.73 m 2 >60   GFR If Non  Latest Ref Range: >60 mL/min/1.73 m 2 >60   Calcium Latest Ref Range: 8.5 - 10.5 mg/dL 8.7   AST(SGOT) Latest Ref Range: 12 - 45 U/L 14   ALT(SGPT) Latest Ref Range: 2 - 50 U/L 18   Alkaline Phosphatase Latest Ref Range: 30 - 99 U/L 150 (H)   Total Bilirubin Latest Ref Range: 0.1 - 1.5 mg/dL 0.3   Albumin Latest Ref Range: 3.2 - 4.9 g/dL 3.2   Total Protein Latest Ref Range: 6.0 - 8.2 g/dL 5.7 (L)   Globulin Latest Ref Range: 1.9 - 3.5 g/dL 2.5   A-G Ratio Latest Units: g/dL 1.3     Results for KANG HALL (MRN [id number])    Ref. Range 8/17/2021 08:55   Carcinoembryonic Antigen Latest Ref Range: 0.0 - 3.0 ng/mL 2.5       Assessment & Plan        1. Malignant neoplasm of rectum (HCC)     2. Secondary malignant neoplasm of liver (HCC)     3. SOB (shortness of breath)     4. Cough     5. History of pulmonary embolus (PE)     6. Current use of long term anticoagulation         1.  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  She is scheduled to proceed with cycle 2, day 1 of Irinotecan/Avastin.  This has been delayed x6 weeks.  Clinically she has significantly proved over the last 2 weeks and appears very stable.  I did review her CBC and CMP and labs are appropriate to proceed with treatment as planned as well.  Therefore we will go ahead and treat with cycle 2 today.    CEA is stable at 2.5 from 6/28/2021.    2.  Patient does continue to have shortness of breath and cough but that is improving significantly.  She does have medications have been prescribed by pulmonary as well as oxygen at home as needed.  Patient to continue with recommendations per pulmonary.    3.  Patient with history of pulmonary embolus many years ago currently on low-dose Xarelto.  Patient to continue on medication as prescribed due to her increased risk of blood clots with her metastatic cancer.    4.   Patient has been doing very well with hydration at home.  Her kidney functions have returned to normal.  Her alkaline phosphatase is also significantly trended down from 304, now at 150.  She does have anemia with a hemoglobin of 10.9 but this is likely related to dilution with increase in hydration.  Her potassium is also slightly low at 3.5.  Informed patient is okay for her to take a dose of Lasix due to possible fluid overload and also take potassium at home with her Lasix dose.  Request patient to do that when she returns home.  However have highly encouraged patient to continue with hydration at home.     Appetite has also improved as patient has noted to have gained approximately 6 pounds in the last 2 weeks which is likely related to increase in fluid intake as well as increase in nutrition.    5.  Patient to continue to follow-up in the clinic every 2 weeks prior to each cycle of chemotherapy or sooner if needed.

## 2021-08-17 NOTE — PROGRESS NOTES
Pt ambulatory to [facility] for C2 D1 of irinotecan and bevacizumab for rectal cancer w/ liver mets.  Pt w/ no s/s of infection, pt reporting fatigue, lack of appetite, but reports this is her new normal.  Pt PORT already accessed from lab draw this morning.  Call from Ella MORALES to draw CEA as well and she will call this RN back if MD decides to treat today.  Pt educated to take potassium supplement w/ a dose of Lasix when she gets home and to use her Lomotil PRN for increased ostomy output.  Pt PORT w/ brisk blood return, lab drawn per order, flushed per protocol.  Pt returned w/ urine sample, POC UA results w/n parameters for tx.  Pt lab results and BP also w/n parameters for tx.  Pre-meds given.  All chemos infused w/ no AE.  PORT w/ brisk blood return post-chemo, flushed and HL per protocol, de-accessed, gauze and tape bandage applied.  Pt left on foot in care of self in NAD.  Confirmed pt's next appt.

## 2021-08-17 NOTE — PROGRESS NOTES
"Pharmacy Chemotherapy Verification  Patient Name: Karlene Walters      Dx:  Met Colon CA      Protocol: Irinotecan + bevacizumab        Irinotecan 150 mg/m2 IV over 90 minutes Day 1  bevacizumab 5 mg/kg IV over 60 minutes C1D1, then 30 min following  14 day cycles until disease progression or unacceptable toxicity  NCCN Guidelines for Colon Cancer v2.2021    Allergies:  Oxaliplatin; Codeine; Pcn [penicillins]; Sulfa drugs; and Tape     /72 Comment: Transfered over from this morning.  Pulse (!) 106 Comment: Transfered over from this morning.  Temp 36.4 °C (97.5 °F) (Temporal) Comment: Transfered over from this morning. Comment (Src): Transfered over from this morning.  Resp 18 Comment: Transfered over from this morning.  Ht 1.62 m (5' 3.78\") Comment: Transfered over from this morning.  Wt 63.1 kg (139 lb 1.8 oz) Comment: Transfered over from this morning.  LMP  (LMP Unknown)   SpO2 99% Comment: Transfered over from this morning.  BMI 24.04 kg/m²  Body surface area is 1.69 meters squared.      Labs 8/17/21  ANC 8620 Hgb 10.9 Plt 265k  SCr 0.74 CrCl 59.1 mL/min   AST/ALT/AP = 14/18/150 Tbili = 0.3     8/17/2021 09:00   POC Protein Negative     Drug Order   (Drug name, dose, route, IV Fluid & volume, frequency, number of doses) Cycle 2 - delayed  Previous treatment: 7/6/21      Medication = Irinotecan   Base Dose = 150 mg/m²  Calc Dose: Base Dose x 1.69 m² = 253.5 mg  Final Dose = 253.6 mg   Route = IV   Fluid & Volume = D5W 500 mL  Admin Duration = Over 90 minutes           Okay to treat with final dose     Medication = Bevacizumab-bvzr  Base Dose = 5 mg/kg  Calc Dose: Base Dose x 63.1 kg = 315.5 mg  Final Dose = 300 mg   Route = IV   Fluid & Volume =  mL  Admin Duration = Over 60 minutes           Okay to treat with final dose     Lora Hernandez, PharmD, BCOP    "

## 2021-08-26 ENCOUNTER — HOSPITAL ENCOUNTER (OUTPATIENT)
Dept: RADIOLOGY | Facility: MEDICAL CENTER | Age: 81
End: 2021-08-26
Attending: NURSE PRACTITIONER
Payer: MEDICARE

## 2021-08-26 ENCOUNTER — OFFICE VISIT (OUTPATIENT)
Dept: HEMATOLOGY ONCOLOGY | Facility: MEDICAL CENTER | Age: 81
End: 2021-08-26
Payer: MEDICARE

## 2021-08-26 ENCOUNTER — TELEPHONE (OUTPATIENT)
Dept: HEMATOLOGY ONCOLOGY | Facility: MEDICAL CENTER | Age: 81
End: 2021-08-26

## 2021-08-26 VITALS
HEIGHT: 62 IN | TEMPERATURE: 99.5 F | RESPIRATION RATE: 14 BRPM | WEIGHT: 138.12 LBS | HEART RATE: 131 BPM | DIASTOLIC BLOOD PRESSURE: 60 MMHG | SYSTOLIC BLOOD PRESSURE: 110 MMHG | OXYGEN SATURATION: 94 % | BODY MASS INDEX: 25.42 KG/M2

## 2021-08-26 DIAGNOSIS — R05.9 COUGH: ICD-10-CM

## 2021-08-26 DIAGNOSIS — R06.02 SOB (SHORTNESS OF BREATH): ICD-10-CM

## 2021-08-26 DIAGNOSIS — C20 MALIGNANT NEOPLASM OF RECTUM (HCC): ICD-10-CM

## 2021-08-26 DIAGNOSIS — C78.7 SECONDARY MALIGNANT NEOPLASM OF LIVER (HCC): ICD-10-CM

## 2021-08-26 PROCEDURE — 71046 X-RAY EXAM CHEST 2 VIEWS: CPT

## 2021-08-26 PROCEDURE — 99214 OFFICE O/P EST MOD 30 MIN: CPT | Performed by: NURSE PRACTITIONER

## 2021-08-26 RX ORDER — PREDNISONE 10 MG/1
TABLET ORAL
Qty: 18 TABLET | Refills: 0 | Status: SHIPPED
Start: 2021-08-26 | End: 2021-09-30

## 2021-08-26 RX ORDER — LEVOFLOXACIN 750 MG/1
750 TABLET, FILM COATED ORAL DAILY
Qty: 5 TABLET | Refills: 0 | Status: SHIPPED | OUTPATIENT
Start: 2021-08-26 | End: 2021-08-31

## 2021-08-26 ASSESSMENT — PAIN SCALES - GENERAL: PAINLEVEL: NO PAIN

## 2021-08-26 ASSESSMENT — ENCOUNTER SYMPTOMS
COUGH: 1
SPUTUM PRODUCTION: 1
SHORTNESS OF BREATH: 1
CHILLS: 0
FEVER: 0
PALPITATIONS: 0
DIARRHEA: 0
WEIGHT LOSS: 1
CONSTIPATION: 0
VOMITING: 0
NAUSEA: 0

## 2021-08-26 ASSESSMENT — FIBROSIS 4 INDEX: FIB4 SCORE: 1.01

## 2021-08-26 NOTE — TELEPHONE ENCOUNTER
Per request of ANDREW, called pt to f/u on current active symptoms.  Spoke with pt's daughter, Diana, whom stated that pt has a continuous productive cough with dark yellowish sputum.  RN could audibly hear pt coughing in the background while speaking with pt's daughter.  Reports increased HR with cough & increased O2 need with cough.  Pt states she is attempting to drink sips of Gatorade/water in between coughing, but not struggling to do so.  Denies fever/chills.  Reports onset of symptoms began at the same time during her treatment cycle as last cycle right before she was diagnosed with pneumonia.  Per pt daughter, pt verbalized not wanting to be seen by doctor at this time & just wants something to stop her from coughing.    Consulted with Ella MORALES regarding pt symptoms & instructed to inform pt that she needs to be seen today in the provider's office for evaluation & possibility of being sent directly to ED.  Informed pt's daughter of ANDREW recommendation.  Diana agreeable with plan at this time & stated they could make it to town for a 1430 office visit.  Verified all questions had been answered.

## 2021-08-26 NOTE — PROGRESS NOTES
Subjective     Karlene Walters is a 81 y.o. 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 She presents accompanied by her  and daughter for today's visit.    Please see detailed note of cancer history of current clinical concerns the past few months regarding patient's respiratory status dated 8/17/21.    At last visit just a little less than 2 weeks ago patient was seen for evaluation for possible chemotherapy.  She had significant improvement in her respiratory status and overall clinical status that we went ahead and proceed with cycle 2 of Irinotecan/Avastin.  Patient's daughter contacted oncology office today stating patient's symptoms of severe coughing or shortness of breath returned last night.  In order for evaluation patient was brought into the clinic for physical examination.    Patient stated that she is having severe coughing and shortness of breath as before.  She has bright yellow/green sputum that she continues to produce and spit up.  She denies any fevers or chills.  Patient was recently hospitalized 7/19-7/23/2021 for same complaints.  She was seen and evaluated by pulmonary and found to have pneumonia.  Patient was treated with antibiotics and prednisone taper.  Unfortunately, patient was sent home on an antibiotic that she was unable to tolerate and did not complete the full 10-day course due to severe rash, but she was able to complete approximately 8/10 days of total antibiotic.  Approximately 1 month post hospitalization she did have significant improvement.  Patient symptoms are very reminiscent of previous symptoms.  In evaluation patient unable to speak no more than 2-3 words before experiencing significant shortness of breath and eliciting a cough.  According to the daughter patient was sitting in a chair for approximately 45 minutes and oxygen saturation was taken at 62%.  " She was sent home with oxygen at last visit with pulmonary, and uses it only as needed.  There were a few instances that daughter was able to report oxygenation at 70% as well as 62%.   stated yesterday she was at 83% at times.  Patient noted to have coarse lung sounds throughout.  Placed patient on 3 L of oxygen in office and she has significant improvement in her shortness of breath.  She was able to speak comfortably with minimal shortness of breath.    Allergies   Allergen Reactions   • Oxaliplatin Anaphylaxis   • Codeine      \"gets drunk\"   • Pcn [Penicillins] Itching     itching   • Sulfa Drugs Itching     itching   • Tape Rash     PAPER TAPE OK   • Amoxicillin Rash     Rash all over body     Current Outpatient Medications on File Prior to Visit   Medication Sig Dispense Refill   • STIOLTO RESPIMAT 2.5-2.5 MCG/ACT Aero Soln INHALE 2 PUFFS BY MOUTH ONCE DAILY 1 Each 5   • ondansetron (ZOFRAN) 4 MG Tab tablet Take 1 tablet by mouth every four hours as needed for Nausea/Vomiting (for nausea, vomiting). 30 tablet 6   • lidocaine-prilocaine (EMLA) 2.5-2.5 % Cream Apply to port one hour prior to access and cover with plastic wrap. 1 Each 3   • pantoprazole (PROTONIX) 40 MG Tablet Delayed Response Take 1 tablet by mouth every day. 90 tablet 3   • albuterol (PROVENTIL) 2.5mg/3ml Nebu Soln solution for nebulization Take 3 mL by nebulization every four hours as needed for Shortness of Breath. 120 mL 11   • albuterol 108 (90 Base) MCG/ACT Aero Soln inhalation aerosol Inhale 2 Puffs every 6 hours as needed for Shortness of Breath. 1 Each 11   • potassium chloride ER (KLOR-CON) 10 MEQ tablet Take 1 tablet by mouth every day. 100 tablet 3   • rivaroxaban (XARELTO) 10 MG Tab tablet Take 1 Tab by mouth every day. 30 Tab 5   • cyanocobalamin (VITAMIN B-12) 500 MCG Tab Take 1,000 mcg by mouth every day.     • Multiple Vitamins-Minerals (CENTRUM SILVER PO) Take 1 Tab by mouth every day.     • Cholecalciferol (VITAMIN D3) " "400 UNITS CAPS Take 1 Cap by mouth every day.     • loratadine (CLARITIN) 10 MG TABS Take 10 mg by mouth every day. Indications: Hayfever       No current facility-administered medications on file prior to visit.         Review of Systems   Constitutional: Positive for malaise/fatigue and weight loss. Negative for chills and fever.   Respiratory: Positive for cough, sputum production and shortness of breath.         Severe cough with bright yellow/green sputum - severe SOB especially with coughing-fits   Cardiovascular: Negative for chest pain and palpitations.   Gastrointestinal: Negative for constipation, diarrhea, nausea and vomiting.              Objective     /60   Pulse (!) 131   Temp 37.5 °C (99.5 °F) (Temporal)   Resp 14   Ht 1.575 m (5' 2\")   Wt 62.6 kg (138 lb 1.9 oz)   LMP  (LMP Unknown)   SpO2 94%   BMI 25.26 kg/m²      Physical Exam  Constitutional:       General: She is in acute distress.      Appearance: She is ill-appearing.   Cardiovascular:      Rate and Rhythm: Tachycardia present.   Pulmonary:      Effort: Respiratory distress present.      Comments: Severe SOB and cough with sputum production  Neurological:      Mental Status: She is alert.                         Assessment & Plan           1. Cough  DX-CHEST-2 VIEWS    levoFLOXacin (LEVAQUIN) 750 MG tablet    predniSONE (DELTASONE) 10 MG Tab   2. SOB (shortness of breath)  DX-CHEST-2 VIEWS    levoFLOXacin (LEVAQUIN) 750 MG tablet    predniSONE (DELTASONE) 10 MG Tab   3. Malignant neoplasm of rectum (HCC)  levoFLOXacin (LEVAQUIN) 750 MG tablet    predniSONE (DELTASONE) 10 MG Tab   4. Secondary malignant neoplasm of liver (HCC)  levoFLOXacin (LEVAQUIN) 750 MG tablet    predniSONE (DELTASONE) 10 MG Tab           Patient with recurrent shortness of breath and cough quite severe.  She has had also desaturation and her oxygen levels.  Long discussion with patient with  and daughter today regarding use of oxygen at home.  Patient " initially on oxygen as needed, however with evidence of desaturation as low as 62% at rest, informed patient that she needs to continue with at home oxygen continuously at this time oxygenation.  Patient does live in [place], however she does have a home here in [place] as well and will contact her oxygen company in order to provide her with oxygen for continuous use.    Patient with coarse lung sounds throughout, with significant shortness of breath and cough along with tachycardia.  Concerning for recurrent infection.  We will send patient for chest x-ray for further evaluation.  I also spoke with previous pulmonary provider, ANDREW Zee regarding patient's current clinical status. In evaluation of patient's medical record she has been previously on Levaquin antibiotic during hospitalization.  In collaboration with pulmonary APRN, will place patient on another course of antibiotics, Levaquin 750 mg p.o. daily x5 days.  Due to the nature of her shortness of breath and cough also prescribed patient prednisone taper 30 mg x 3 days, 20 mg x 3 days, 10 mg x 3 days and stop.     Also discussed with ANDREW Malone possibility of further evaluation such as bronchoscopy due to the chronic nature and recurrence of decompensation of patient's respiratory status.  She will discuss further with pulmonary providers and patient is scheduled to follow-up in the pulmonary clinic in approximately 3 weeks.    Patient completed CXR which shows slight right basilar atelectasis, slightly worsen from 2.5 weeks ago.     Patient to follow up in oncology office next as scheduled for re-evaluation. She will be due for cycle 3 of chemo, but likely this will be held d/t her current clinical status. Also discussed with patient and her family regarding going to the ER should she worsen. Patient agreed to plan above and confirmed with patient that she was able to acquire oxygen for her home here in [place] in order to use continuously.        DX-CHEST-2 VIEWS    Result Date: 8/26/2021 8/26/2021 3:45 PM HISTORY/REASON FOR EXAM:  Shortness of Breath TECHNIQUE/EXAM DESCRIPTION: PA and lateral views of the chest. COMPARISON:  Chest x-ray 8/5/2021 FINDINGS: There is a left IJ Port-A-Cath with its distal tip extending into the right atrium. There is a slight curvilinear opacity in the right lung base. The cardiac silhouette is normal in size. No effusions or pneumothoraces are present. There are no significant osseous abnormalities. The visualized portions of the upper abdomen are within normal limits.     1.  Slight right basilar atelectasis. 2.  Left IJ Port-A-Cath in place.

## 2021-08-30 ENCOUNTER — APPOINTMENT (OUTPATIENT)
Dept: HEMATOLOGY ONCOLOGY | Facility: MEDICAL CENTER | Age: 81
End: 2021-08-30
Payer: MEDICARE

## 2021-08-31 ENCOUNTER — APPOINTMENT (OUTPATIENT)
Dept: ONCOLOGY | Facility: MEDICAL CENTER | Age: 81
End: 2021-08-31
Attending: INTERNAL MEDICINE
Payer: MEDICARE

## 2021-08-31 ENCOUNTER — OFFICE VISIT (OUTPATIENT)
Dept: HEMATOLOGY ONCOLOGY | Facility: MEDICAL CENTER | Age: 81
End: 2021-08-31
Payer: MEDICARE

## 2021-08-31 VITALS
TEMPERATURE: 96.8 F | DIASTOLIC BLOOD PRESSURE: 68 MMHG | WEIGHT: 133.49 LBS | BODY MASS INDEX: 24.56 KG/M2 | RESPIRATION RATE: 16 BRPM | HEART RATE: 125 BPM | OXYGEN SATURATION: 97 % | SYSTOLIC BLOOD PRESSURE: 106 MMHG | HEIGHT: 62 IN

## 2021-08-31 DIAGNOSIS — R06.02 SOB (SHORTNESS OF BREATH): ICD-10-CM

## 2021-08-31 DIAGNOSIS — C20 MALIGNANT NEOPLASM OF RECTUM (HCC): ICD-10-CM

## 2021-08-31 DIAGNOSIS — R05.9 COUGH: ICD-10-CM

## 2021-08-31 DIAGNOSIS — C78.7 SECONDARY MALIGNANT NEOPLASM OF LIVER (HCC): ICD-10-CM

## 2021-08-31 PROCEDURE — 99213 OFFICE O/P EST LOW 20 MIN: CPT | Performed by: NURSE PRACTITIONER

## 2021-08-31 ASSESSMENT — ENCOUNTER SYMPTOMS
PALPITATIONS: 1
CONSTIPATION: 0
VOMITING: 0
HEMOPTYSIS: 0
COUGH: 1
NAUSEA: 0
DIZZINESS: 0
WEIGHT LOSS: 1
FEVER: 0
DIARRHEA: 0
SHORTNESS OF BREATH: 1
SPUTUM PRODUCTION: 1
CHILLS: 0

## 2021-08-31 ASSESSMENT — FIBROSIS 4 INDEX: FIB4 SCORE: 1.01

## 2021-08-31 ASSESSMENT — PAIN SCALES - GENERAL: PAINLEVEL: NO PAIN

## 2021-09-01 ENCOUNTER — TELEPHONE (OUTPATIENT)
Dept: HEMATOLOGY ONCOLOGY | Facility: MEDICAL CENTER | Age: 81
End: 2021-09-01

## 2021-09-01 NOTE — TELEPHONE ENCOUNTER
Patient called to check in with Ella per her request. She stated she is feeling better today, her mucus is more loose today. She also stated the Dr. Haque is on Maternity leave but that she is getting in on 09/14 with Dr. Daugherty To with Cardiology, that was their only appointment. She thinks she may be able to attend Chemo and Cardiology on the same day as well. I advised her that I would relay this information to Ella. She agreed and thank me.

## 2021-09-01 NOTE — TELEPHONE ENCOUNTER
Contacted patient back. She is doing a little better but still with SOB. She is scheduled to see a cardiologist on 9/14.  Discussed with patient that we will hold her chemotherapy again in 2 weeks.  I personally spoke with Dr. Steele.  Will await for evaluation with cardiology and pulmonary.  Dr. Steele has requested a second opinion with new medical oncologist in our office, Dr. Blelo who has a focus in gastroenterology cancers.  Discussed this with patient and she verbalized understanding's and agreed with the plan.  We will have her follow-up in the clinic in 2 weeks, or sooner if needed.

## 2021-09-02 RX ORDER — HEPARIN SODIUM (PORCINE) LOCK FLUSH IV SOLN 100 UNIT/ML 100 UNIT/ML
500 SOLUTION INTRAVENOUS PRN
Status: CANCELLED | OUTPATIENT
Start: 2021-09-14

## 2021-09-02 RX ORDER — 0.9 % SODIUM CHLORIDE 0.9 %
10 VIAL (ML) INJECTION PRN
Status: CANCELLED | OUTPATIENT
Start: 2021-09-14

## 2021-09-02 RX ORDER — 0.9 % SODIUM CHLORIDE 0.9 %
20 VIAL (ML) INJECTION PRN
Status: CANCELLED | OUTPATIENT
Start: 2021-09-14

## 2021-09-07 ENCOUNTER — TELEPHONE (OUTPATIENT)
Dept: HEMATOLOGY ONCOLOGY | Facility: MEDICAL CENTER | Age: 81
End: 2021-09-07

## 2021-09-07 NOTE — TELEPHONE ENCOUNTER
Pt called into the clinic stating they were calling to speak with ANDREW De or MICHAEL Cherry to check in on how they are feeling. Pt states that they think they feel good. Informed Pt I would relay the information. Pt acknowledged and would like a call back when possible.

## 2021-09-09 ENCOUNTER — TELEPHONE (OUTPATIENT)
Dept: HEMATOLOGY ONCOLOGY | Facility: MEDICAL CENTER | Age: 81
End: 2021-09-09

## 2021-09-09 NOTE — TELEPHONE ENCOUNTER
Spoke to patient on the phone. Patient stated that she completed her steroid taper this Monday, 9/6/2021.  She stated she was feeling better with minimal shortness of breath but staying on the oxygen full-time.  She stated on Tuesday she started noticing she had to clear her throat little bit more, and as of yesterday her cough is returned with yellow sputum production.  She denies any fevers or chills.  Shortness of breath is still present but not as profound.  She is attempting to eat but not eating large quantities of food.    Patient to continue with hydration and nutrition as well as continue on the oxygen.  She is being seen by cardiology, pulmonology and oncology next week.

## 2021-09-14 ENCOUNTER — APPOINTMENT (OUTPATIENT)
Dept: HEMATOLOGY ONCOLOGY | Facility: MEDICAL CENTER | Age: 81
End: 2021-09-14
Payer: MEDICARE

## 2021-09-14 ENCOUNTER — OFFICE VISIT (OUTPATIENT)
Dept: CARDIOLOGY | Facility: MEDICAL CENTER | Age: 81
End: 2021-09-14
Payer: MEDICARE

## 2021-09-14 ENCOUNTER — OUTPATIENT INFUSION SERVICES (OUTPATIENT)
Dept: ONCOLOGY | Facility: MEDICAL CENTER | Age: 81
End: 2021-09-14
Attending: INTERNAL MEDICINE
Payer: MEDICARE

## 2021-09-14 VITALS
TEMPERATURE: 97 F | RESPIRATION RATE: 18 BRPM | HEART RATE: 124 BPM | SYSTOLIC BLOOD PRESSURE: 115 MMHG | OXYGEN SATURATION: 99 % | DIASTOLIC BLOOD PRESSURE: 59 MMHG

## 2021-09-14 VITALS
HEIGHT: 65 IN | WEIGHT: 134.4 LBS | BODY MASS INDEX: 22.39 KG/M2 | SYSTOLIC BLOOD PRESSURE: 84 MMHG | HEART RATE: 99 BPM | DIASTOLIC BLOOD PRESSURE: 60 MMHG | OXYGEN SATURATION: 99 % | RESPIRATION RATE: 15 BRPM

## 2021-09-14 DIAGNOSIS — Z79.01 CURRENT USE OF LONG TERM ANTICOAGULATION: Chronic | ICD-10-CM

## 2021-09-14 DIAGNOSIS — Z79.899 HIGH RISK MEDICATION USE: ICD-10-CM

## 2021-09-14 DIAGNOSIS — R94.4 DECREASED GFR: ICD-10-CM

## 2021-09-14 DIAGNOSIS — Z79.01 CURRENT USE OF LONG TERM ANTICOAGULATION: ICD-10-CM

## 2021-09-14 DIAGNOSIS — D70.1 CHEMOTHERAPY-INDUCED NEUTROPENIA (HCC): ICD-10-CM

## 2021-09-14 DIAGNOSIS — N17.9 ACUTE RENAL FAILURE, UNSPECIFIED ACUTE RENAL FAILURE TYPE (HCC): ICD-10-CM

## 2021-09-14 DIAGNOSIS — Z86.711 HISTORY OF PULMONARY EMBOLUS (PE): ICD-10-CM

## 2021-09-14 DIAGNOSIS — R06.09 DYSPNEA ON EXERTION: ICD-10-CM

## 2021-09-14 DIAGNOSIS — T45.1X5A CHEMOTHERAPY-INDUCED NEUTROPENIA (HCC): ICD-10-CM

## 2021-09-14 DIAGNOSIS — E86.0 DEHYDRATION: ICD-10-CM

## 2021-09-14 DIAGNOSIS — C18.9 METASTATIC COLON CANCER IN FEMALE: Primary | ICD-10-CM

## 2021-09-14 DIAGNOSIS — R19.7 DIARRHEA, UNSPECIFIED TYPE: ICD-10-CM

## 2021-09-14 DIAGNOSIS — C20 MALIGNANT NEOPLASM OF RECTUM (HCC): ICD-10-CM

## 2021-09-14 DIAGNOSIS — Z95.828 PORT-A-CATH IN PLACE: ICD-10-CM

## 2021-09-14 LAB
ALBUMIN SERPL BCP-MCNC: 3.4 G/DL (ref 3.2–4.9)
ALBUMIN/GLOB SERPL: 0.9 G/DL
ALP SERPL-CCNC: 243 U/L (ref 30–99)
ALT SERPL-CCNC: 28 U/L (ref 2–50)
ANION GAP SERPL CALC-SCNC: 17 MMOL/L (ref 7–16)
AST SERPL-CCNC: 31 U/L (ref 12–45)
BASOPHILS # BLD AUTO: 1.1 % (ref 0–1.8)
BASOPHILS # BLD: 0.13 K/UL (ref 0–0.12)
BILIRUB SERPL-MCNC: 0.4 MG/DL (ref 0.1–1.5)
BUN SERPL-MCNC: 27 MG/DL (ref 8–22)
CALCIUM SERPL-MCNC: 9.8 MG/DL (ref 8.5–10.5)
CEA SERPL-MCNC: 4.2 NG/ML (ref 0–3)
CHLORIDE SERPL-SCNC: 93 MMOL/L (ref 96–112)
CO2 SERPL-SCNC: 19 MMOL/L (ref 20–33)
CREAT SERPL-MCNC: 1.2 MG/DL (ref 0.5–1.4)
EOSINOPHIL # BLD AUTO: 0.25 K/UL (ref 0–0.51)
EOSINOPHIL NFR BLD: 2.1 % (ref 0–6.9)
ERYTHROCYTE [DISTWIDTH] IN BLOOD BY AUTOMATED COUNT: 60.6 FL (ref 35.9–50)
GLOBULIN SER CALC-MCNC: 3.8 G/DL (ref 1.9–3.5)
GLUCOSE SERPL-MCNC: 151 MG/DL (ref 65–99)
HCT VFR BLD AUTO: 40.1 % (ref 37–47)
HGB BLD-MCNC: 12.6 G/DL (ref 12–16)
IMM GRANULOCYTES # BLD AUTO: 0.23 K/UL (ref 0–0.11)
IMM GRANULOCYTES NFR BLD AUTO: 2 % (ref 0–0.9)
LYMPHOCYTES # BLD AUTO: 1.19 K/UL (ref 1–4.8)
LYMPHOCYTES NFR BLD: 10.1 % (ref 22–41)
MCH RBC QN AUTO: 27.2 PG (ref 27–33)
MCHC RBC AUTO-ENTMCNC: 31.4 G/DL (ref 33.6–35)
MCV RBC AUTO: 86.6 FL (ref 81.4–97.8)
MONOCYTES # BLD AUTO: 1.17 K/UL (ref 0–0.85)
MONOCYTES NFR BLD AUTO: 9.9 % (ref 0–13.4)
MYCOBACTERIUM SPEC CULT: NORMAL
NEUTROPHILS # BLD AUTO: 8.81 K/UL (ref 2–7.15)
NEUTROPHILS NFR BLD: 74.8 % (ref 44–72)
NRBC # BLD AUTO: 0 K/UL
NRBC BLD-RTO: 0 /100 WBC
PLATELET # BLD AUTO: 373 K/UL (ref 164–446)
PMV BLD AUTO: 10.2 FL (ref 9–12.9)
POTASSIUM SERPL-SCNC: 4.4 MMOL/L (ref 3.6–5.5)
PROT SERPL-MCNC: 7.2 G/DL (ref 6–8.2)
RBC # BLD AUTO: 4.63 M/UL (ref 4.2–5.4)
RHODAMINE-AURAMINE STN SPEC: NORMAL
SIGNIFICANT IND 70042: NORMAL
SITE SITE: NORMAL
SODIUM SERPL-SCNC: 129 MMOL/L (ref 135–145)
SOURCE SOURCE: NORMAL
WBC # BLD AUTO: 11.8 K/UL (ref 4.8–10.8)

## 2021-09-14 PROCEDURE — 82378 CARCINOEMBRYONIC ANTIGEN: CPT

## 2021-09-14 PROCEDURE — 36591 DRAW BLOOD OFF VENOUS DEVICE: CPT

## 2021-09-14 PROCEDURE — A4212 NON CORING NEEDLE OR STYLET: HCPCS

## 2021-09-14 PROCEDURE — 36593 DECLOT VASCULAR DEVICE: CPT

## 2021-09-14 PROCEDURE — 85025 COMPLETE CBC W/AUTO DIFF WBC: CPT

## 2021-09-14 PROCEDURE — 80053 COMPREHEN METABOLIC PANEL: CPT

## 2021-09-14 PROCEDURE — 700111 HCHG RX REV CODE 636 W/ 250 OVERRIDE (IP)

## 2021-09-14 PROCEDURE — 99214 OFFICE O/P EST MOD 30 MIN: CPT | Performed by: INTERNAL MEDICINE

## 2021-09-14 RX ORDER — HEPARIN SODIUM (PORCINE) LOCK FLUSH IV SOLN 100 UNIT/ML 100 UNIT/ML
SOLUTION INTRAVENOUS
Status: COMPLETED
Start: 2021-09-14 | End: 2021-09-14

## 2021-09-14 RX ADMIN — HEPARIN 5 ML: 100 SYRINGE at 14:21

## 2021-09-14 ASSESSMENT — ENCOUNTER SYMPTOMS
MYALGIAS: 0
DEPRESSION: 0
HEADACHES: 0
ABDOMINAL PAIN: 0
FEVER: 0
PALPITATIONS: 0
DIZZINESS: 0
FALLS: 0
DIAPHORESIS: 0
SENSORY CHANGE: 0
MEMORY LOSS: 0
BRUISES/BLEEDS EASILY: 0
DOUBLE VISION: 0
COUGH: 1
SHORTNESS OF BREATH: 1
BLURRED VISION: 0

## 2021-09-14 ASSESSMENT — FIBROSIS 4 INDEX: FIB4 SCORE: 1.01

## 2021-09-14 NOTE — PROGRESS NOTES
Chief Complaint   Patient presents with   • Paroxysmal Supraventricular Tachycardia (PSVT)       Subjective     Karlene Walters is a 81 y.o. female who presents today for cardiac care and management due to ongoing shortness of breath.  Patient does have history of cancer.  She had chemotherapy-induced neutropenia.  She is currently on oxygen.  She also has prior history of pulmonary embolism.  She was seen by one of our partners Dr. Haque in the past who did not identify any specific cardiac reasons for her shortness of breath.  She also had a transthoracic echocardiogram was found to be normal earlier this year.    Past Medical History:   Diagnosis Date   • Adverse effect of anesthesia     elevated BP postop   • Anesthesia    • Arrhythmia     occasional   • Blood clotting disorder (HCC) 08/2015    bilat PE    • Blood transfusion 1957   • Breath shortness     pt reports d/t chemo treatment; no O2; with moderate exertion; no c/o at this time   • Cancer (HCC) 09/2012    rectal cancer,  Liver CA-2015   • CATARACT     removed b/l   • Chemotherapy-induced neutropenia (HCC) 9/28/2016   • Chickenpox     history of   • Colon cancer (HCC)    • Dental disorder     dentures UPPERS AND LOWERS   • Elevated alkaline phosphatase level 11/15/2017   • Emphysema of lung (HCC)     COPD   • Fall    • Portuguese measles     history of   • Heart murmur     as a child   • Hemorrhagic disorder (HCC)     on Xarelto    • High cholesterol    • Hypercholesteremia    • Hypertension     no meds currently    • Ileostomy in place (HCC)    • Ileostomy in place (HCC)    • Indigestion    • Influenza     history of   • Lightheadedness 9/17/2015   • Mumps     history of   • Obstruction     ileostomy   • Other specified symptom associated with female genital organs     HYSTERECTOMY 1993   • Pneumonia 05/2017    tx'd with antibx   • Pulmonary embolism (HCC)    • 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)    • Renal insufficiency 3/14/2016   •  Restless legs 5/6/2020   • Routine general medical examination at a health care facility 3/14/2016    3/14/16   • Seasonal allergies    • Swelling of both ankles     Lasix PRN   • Tonsillitis      Past Surgical History:   Procedure Laterality Date   • HEPATIC ABLATION  5/28/2020    Procedure: ABLATION, LESION, LIVER-TRISEGMENTECTOMY, MICROWAVE ABLATION, INTRA OPERATIVE ULTRA SOUND, SIMPLE RESECTED / REPAIR OF DIAPHRAGM;  Surgeon: Kit West M.D.;  Location: SURGERY [facility];  Service: General   • HEPATIC ABLATION LAPAROSCOPIC  11/15/2018    Procedure: HEPATIC ABLATION LAPAROSCOPIC.- SEGMENT 7/8;  Surgeon: Kit West M.D.;  Location: SURGERY [facility];  Service: General   • COLONOSCOPY WITH BIOPSY  8/23/2015    Procedure: COLONOSCOPY WITH BIOPSY;  Surgeon: Wilbur Glasgow M.D.;  Location: ENDOSCOPY [facility];  Service:    • HEPATIC ABLATION LAPAROSCOPIC  7/6/2015    Procedure: HEPATIC ABLATION LAPAROSCOPIC.;  Surgeon: Kit West M.D.;  Location: SURGERY [facility];  Service:    • CATH PLACEMENT Left 7/6/2015    Procedure: CATH PLACEMENT CEPHALIC POWERPORT;  Surgeon: Kit West M.D.;  Location: SURGERY [facility];  Service:    • FISTULA IN ANO REPAIR  1/28/2015    Performed by Kolton Montgomery M.D. at SURGERY [facility]   • PERINEAL PROCEDURE  1/28/2015    Performed by Kolton Montgomery M.D. at SURGERY [facility]   • FISTULA IN ANO REPAIR  10/15/2014    Performed by Kolton Montgomery M.D. at SURGERY [facility]   • PERINEAL PROCEDURE  10/15/2014    Performed by Kolton Montgomery M.D. at [facility]   • IRRIGATION & DEBRIDEMENT GENERAL  2/19/2014    Performed by Kolton Montgomery M.D. at [facility]   • FLAP GRAFT  2/19/2014    Performed by Kolton Montgomery M.D. at [facility]   • PERINEAL PROCEDURE  12/18/2013    Performed by Kolton Montgomery M.D. at SURGERY [facility]   • MYOCUTANEOUS FLAP  12/18/2013     Performed by Kolton Montgomery M.D. at SURGERY [facility]   • IRRIGATION & DEBRIDEMENT GENERAL  10/23/2013    Performed by Kolton Montgomery M.D. at SURGERY [facility]   • FISTULA IN ANO REPAIR  9/4/2013    Performed by Kolton Montgomery M.D. at SURGERY [facility]   • FLAP ROTATION  9/4/2013    Performed by Kolton Montgomery M.D. at SURGERY [facility]   • RECOVERY  8/26/2013    Performed by [facility]Rec[facility] Surgery at [facility] SAME DAY [facility]   • BIOPSY GENERAL  7/17/2013    Performed by Kolton Montgomery M.D. at SURGERY [facility]   • PROCTOSCOPY  7/17/2013    Performed by Kolton Montgomery M.D. at SURGERY [facility]   • FISTULA IN ANO REPAIR  2/27/2013    Performed by Kolton Montgomery M.D. at SUR[facility]ERY [facility]   • SIGMOIDOSCOPY  2/27/2013    Performed by Kolton Montgomery M.D. at SURGERY [facility]   • LAPAROSCOPY  10/8/2012    Performed by Kolton Montgomery M.D. at SURGERY [facility]   • ILEO LOOP DIVERSION  10/8/2012    Performed by Kolton Montgomery M.D. at SURGERY [facility]   • COLECTOMY  9/26/2012    Performed by Kolton Montgomery M.D. at [facility]   • COLONOSCOPY FLEX W/ENDO US  9/20/2012    Performed by Harshil Bolton M.D. at SURGERY [facility]   • OTHER  2009    left eye torn retina repair   • CATARACT EXTRACTION WITH IOL  2004    bilateral   • ABDOMINAL HYSTERECTOMY TOTAL  1983   • CYST EXCISION  1972    ovarian   • COLON RESECTION     • EYE SURGERY      cataracts   • HYSTERECTOMY LAPAROSCOPY     • PB REMV 2ND CATARACT,CORN-SCLER SECTN     • TONSILLECTOMY       Family History   Problem Relation Age of Onset   • Heart Disease Mother    • Heart Failure Mother    • Hypertension Mother    • Hyperlipidemia Mother    • Cancer Mother    • Cancer Maternal Aunt 60        breast ca   • Lung Disease Brother         COPD/Emphysema/Smoker   • Cancer Brother         prostate cancer   • Alcohol/Drug Brother         Alcohol   • Kidney Disease Father         renal failure     Social  "History     Socioeconomic History   • Marital status:      Spouse name: Not on file   • Number of children: Not on file   • Years of education: Not on file   • Highest education level: Not on file   Occupational History   • Not on file   Tobacco Use   • Smoking status: Never Smoker   • Smokeless tobacco: Never Used   Vaping Use   • Vaping Use: Never used   Substance and Sexual Activity   • Alcohol use: No   • Drug use: No   • Sexual activity: Never     Partners: Male     Birth control/protection: Post-Menopausal   Other Topics Concern   • Primary/coprimary nurse & associates Not Asked   • Family contact information Not Asked   • OK to release patient information to the following Not Asked   • Patient preferred routine/Privacy concerns Not Asked   • Patient likes and dislikes Not Asked   • Participating In Research Study Not Asked   • Miscellaneous Not Asked   Social History Narrative   • Not on file     Social Determinants of Health     Financial Resource Strain:    • Difficulty of Paying Living Expenses:    Food Insecurity:    • Worried About Running Out of Food in the Last Year:    • Ran Out of Food in the Last Year:    Transportation Needs:    • Lack of Transportation (Medical):    • Lack of Transportation (Non-Medical):    Physical Activity:    • Days of Exercise per Week:    • Minutes of Exercise per Session:    Stress:    • Feeling of Stress :    Social Connections:    • Frequency of Communication with Friends and Family:    • Frequency of Social Gatherings with Friends and Family:    • Attends Hoah[employer]ism Services:    • Active Member of Clubs or Organizations:    • Attends Club or Organization Meetings:    • Marital Status:    Intimate Partner Violence:    • Fear of Current or Ex-Partner:    • Emotionally Abused:    • Physically Abused:    • Sexually Abused:      Allergies   Allergen Reactions   • Oxaliplatin Anaphylaxis   • Codeine      \"gets drunk\"   • Pcn [Penicillins] Itching     itching   • Sulfa " Drugs Itching     itching   • Tape Rash     PAPER TAPE OK   • Amoxicillin Rash     Rash all over body     Outpatient Encounter Medications as of 9/14/2021   Medication Sig Dispense Refill   • XARELTO 10 MG Tab tablet Take 1 tablet by mouth once daily 30 Tablet 5   • predniSONE (DELTASONE) 10 MG Tab Take 30 mg x3 days - then 20 mg x3 days - then 10 mg x3 days and stop 18 Tablet 0   • STIOLTO RESPIMAT 2.5-2.5 MCG/ACT Aero Soln INHALE 2 PUFFS BY MOUTH ONCE DAILY 1 Each 5   • ondansetron (ZOFRAN) 4 MG Tab tablet Take 1 tablet by mouth every four hours as needed for Nausea/Vomiting (for nausea, vomiting). 30 tablet 6   • lidocaine-prilocaine (EMLA) 2.5-2.5 % Cream Apply to port one hour prior to access and cover with plastic wrap. 1 Each 3   • albuterol (PROVENTIL) 2.5mg/3ml Nebu Soln solution for nebulization Take 3 mL by nebulization every four hours as needed for Shortness of Breath. 120 mL 11   • albuterol 108 (90 Base) MCG/ACT Aero Soln inhalation aerosol Inhale 2 Puffs every 6 hours as needed for Shortness of Breath. 1 Each 11   • potassium chloride ER (KLOR-CON) 10 MEQ tablet Take 1 tablet by mouth every day. 100 tablet 3   • cyanocobalamin (VITAMIN B-12) 500 MCG Tab Take 1,000 mcg by mouth every day.     • Multiple Vitamins-Minerals (CENTRUM SILVER PO) Take 1 Tab by mouth every day.     • Cholecalciferol (VITAMIN D3) 400 UNITS CAPS Take 1 Cap by mouth every day.     • loratadine (CLARITIN) 10 MG TABS Take 10 mg by mouth every day. Indications: Hayfever     • pantoprazole (PROTONIX) 40 MG Tablet Delayed Response Take 1 tablet by mouth every day. (Patient not taking: Reported on 8/31/2021) 90 tablet 3     No facility-administered encounter medications on file as of 9/14/2021.     Review of Systems   Constitutional: Positive for malaise/fatigue. Negative for diaphoresis and fever.   HENT: Negative for nosebleeds.    Eyes: Negative for blurred vision and double vision.   Respiratory: Positive for cough and shortness  "of breath.    Cardiovascular: Negative for chest pain and palpitations.   Gastrointestinal: Negative for abdominal pain.   Genitourinary: Negative for dysuria and frequency.   Musculoskeletal: Negative for falls and myalgias.   Skin: Negative for rash.   Neurological: Negative for dizziness, sensory change and headaches.   Endo/Heme/Allergies: Does not bruise/bleed easily.   Psychiatric/Behavioral: Negative for depression and memory loss.       Objective     BP (!) 84/60 (BP Location: Right arm, Patient Position: Sitting, BP Cuff Size: Adult)   Pulse 99   Resp 15   Ht 1.651 m (5' 5\")   Wt 61 kg (134 lb 6.4 oz)   LMP  (LMP Unknown)   SpO2 99%   BMI 22.37 kg/m²     Physical Exam  Vitals and nursing note reviewed.   Constitutional:       General: She is not in acute distress.     Appearance: She is not diaphoretic.   HENT:      Head: Normocephalic and atraumatic.      Right Ear: External ear normal.      Left Ear: External ear normal.   Eyes:      General:         Right eye: No discharge.         Left eye: No discharge.   Neck:      Thyroid: No thyromegaly.      Vascular: No JVD.   Cardiovascular:      Rate and Rhythm: Normal rate and regular rhythm.      Heart sounds: Normal heart sounds.   Pulmonary:      Effort: No respiratory distress.      Breath sounds: Normal breath sounds.      Comments: Patient is dependent on supplemental oxygen.    Abdominal:      General: Bowel sounds are normal. There is no distension.      Tenderness: There is no abdominal tenderness.   Musculoskeletal:         General: No tenderness.   Skin:     General: Skin is warm and dry.   Neurological:      Mental Status: She is alert and oriented to person, place, and time.      Cranial Nerves: No cranial nerve deficit.   Psychiatric:         Behavior: Behavior normal.           Assessment & Plan     1. Dyspnea on exertion     2. Chemotherapy-induced neutropenia (HCC)     3. Current use of long term anticoagulation     4. History of " pulmonary embolus (PE)         Medical Decision Making: Today's Assessment/Status/Plan:      At this time, I am not identifying any specific cardiac etiology for her ongoing dyspnea.  I do suspect that she might have underlying pulmonary disease along with cancer status contributing to her overall symptomatology.  Therefore, I am not inclined to do any further cardiac work-up or intervention.  She will see a pulmonology specialist in the near future.  Continue cancer care with oncologist.

## 2021-09-14 NOTE — PROGRESS NOTES
Patient presents to [facility] for lab work. Port accessed using sterile technique, labs drawn, line flushed, heparin infused, port de-accessed. Guaze and paper tape used to cover site. Next appointment confirmed. Patient discharged to self care in no apparent distress.

## 2021-09-15 ENCOUNTER — OFFICE VISIT (OUTPATIENT)
Dept: HEMATOLOGY ONCOLOGY | Facility: MEDICAL CENTER | Age: 81
End: 2021-09-15

## 2021-09-15 ENCOUNTER — SLEEP CENTER VISIT (OUTPATIENT)
Dept: SLEEP MEDICINE | Facility: MEDICAL CENTER | Age: 81
End: 2021-09-15
Payer: MEDICARE

## 2021-09-15 VITALS
HEIGHT: 65 IN | WEIGHT: 133.82 LBS | SYSTOLIC BLOOD PRESSURE: 102 MMHG | OXYGEN SATURATION: 98 % | RESPIRATION RATE: 12 BRPM | BODY MASS INDEX: 22.3 KG/M2 | TEMPERATURE: 97.1 F | HEART RATE: 98 BPM | DIASTOLIC BLOOD PRESSURE: 58 MMHG

## 2021-09-15 VITALS
DIASTOLIC BLOOD PRESSURE: 70 MMHG | RESPIRATION RATE: 18 BRPM | TEMPERATURE: 97.4 F | OXYGEN SATURATION: 98 % | HEART RATE: 108 BPM | SYSTOLIC BLOOD PRESSURE: 104 MMHG | BODY MASS INDEX: 21.99 KG/M2 | HEIGHT: 65 IN | WEIGHT: 132 LBS

## 2021-09-15 DIAGNOSIS — C20 RECTAL CANCER (HCC): ICD-10-CM

## 2021-09-15 DIAGNOSIS — Z86.711 HISTORY OF PULMONARY EMBOLUS (PE): ICD-10-CM

## 2021-09-15 DIAGNOSIS — C20 MALIGNANT NEOPLASM OF RECTUM (HCC): Chronic | ICD-10-CM

## 2021-09-15 DIAGNOSIS — R05.9 COUGH: ICD-10-CM

## 2021-09-15 DIAGNOSIS — R93.89 ABNORMAL CT OF THE CHEST: ICD-10-CM

## 2021-09-15 PROCEDURE — 99214 OFFICE O/P EST MOD 30 MIN: CPT | Performed by: PHYSICIAN ASSISTANT

## 2021-09-15 PROCEDURE — 99214 OFFICE O/P EST MOD 30 MIN: CPT | Performed by: STUDENT IN AN ORGANIZED HEALTH CARE EDUCATION/TRAINING PROGRAM

## 2021-09-15 ASSESSMENT — ENCOUNTER SYMPTOMS
NAUSEA: 0
DOUBLE VISION: 0
BACK PAIN: 1
HEARTBURN: 0
NERVOUS/ANXIOUS: 1
BRUISES/BLEEDS EASILY: 0
VOMITING: 0
BLURRED VISION: 0
WEAKNESS: 1
HEADACHES: 0
SORE THROAT: 0
BLOOD IN STOOL: 0
FEVER: 0
ABDOMINAL PAIN: 0
PALPITATIONS: 0
CHILLS: 0
MYALGIAS: 1
DIARRHEA: 0
SPUTUM PRODUCTION: 0
WEIGHT LOSS: 0
ORTHOPNEA: 0
SINUS PAIN: 0
CONSTIPATION: 0
SHORTNESS OF BREATH: 1
INSOMNIA: 0
WHEEZING: 0
DIAPHORESIS: 0
TINGLING: 0
DIZZINESS: 0
COUGH: 1

## 2021-09-15 ASSESSMENT — PAIN SCALES - GENERAL
PAINLEVEL: NO PAIN
PAINLEVEL: NO PAIN

## 2021-09-15 ASSESSMENT — FIBROSIS 4 INDEX
FIB4 SCORE: 1.27
FIB4 SCORE: 1.27

## 2021-09-15 NOTE — PATIENT INSTRUCTIONS
1-reviewed imaging with Dr. ALLEN Ventura  2-update CT scan   3-provide with sputum cups   4-improved at current time, mod yellow production  5-some benefit seen with stiolto and albuterol  6-using nebulizer twice per day  7-follow up as scheduled with Dr. Ventuar on 9/30/21

## 2021-09-16 NOTE — PROGRESS NOTES
Follow Up Note:  Hematology/Oncology      Primary Care:  Manan Quiñonez M.D.    Diagnosis: Metastatic rectal adenocarcinoma    Chief Complaint: Shortness of breath, management of systemic therapy    Current Treatment: Irinotecan and bevacizumab, on hold since last treatment 8/17/21    Oncology History of Presenting Illness:  Karlene Walters is a 81 y.o.  woman who presents to the clinic today for evaluation for systemic therapy.  Her oncology history is reviewed as follows (from Ella De, ANDREW):    Patient with a long history of rectal carcinoma initially diagnosed in 2012.  She was found to have metastatic disease to liver and lung in 2015.  She is status post liver ablation as well as has received Y 90 in the past.  She was on XELOX initially but had allergic reaction to oxaliplatin (last dose 7/28/15) and was switched to single agent 5-FU. Patient initially on 5-FU at 2400 mg/m2 with Leucovorin and 5-FU push starting on 10/27/15, followed by 20% dose reduction to 1920 mg/m2 for cycle 18 (7/5/16), followed by deletion of Leucovorin and 5-FU push on 10/31/17. In January 2020 patient was changed to every 3-week cycle at cycle #99 ([place] #91) and then changed to every 4-week interval in May 2020.  It was at that time there was noted to have growth of disease within the liver and was seen by Dr. West, surgeon.  Patient did undergo surgery in hopes of resection of the tumor but it was felt to be too close to the hilum and therefore she did undergo ablation instead on 5/28/20.  She has been back on single agent 5-FU at every 2-week interval, restarted on 6/16/20. Last CT 10/06/20 showed the tumor ablation cavities within the right lobe of the liver have decreased in size since previous examination.  There is a 1.5 x 3.1 cm focal area of enhancement adjacent to the ablation cavity that appears to have increased concerning for possible tumor recurrence.  Based on that finding she was sent for  an MRI for further evaluation on 10/8/2020 which confirmed that the appearance of the masses noted in the liver did not have an appearance for suspicious finding for recurrent metastatic disease.  Patient continued with MRI follow up imaging every 3 months, most recent on 4/5/21 showed stable appearance of the liver with distortion of the right lobe consistent with prior partial hepatectomy and ablation.  No evidence of recurrent tumor.  Enhancing soft tissue nodule in the right cardiophrenic angle again seen and unchanged consistent with adenopathy, measuring 17 mm in size.      March 2021 patient had experienced significant fatigue and shortness of breath.  This had progressively worsened. She was hospitalized in March. She was seen by cardiology and echocardiogram completed which showed an ejection fraction of 65%.  CT chest with contrast completed in March was stable. Continued and progressively worsening SOB, repeat CT chest 5/18/2021. Patient was seen by pulmonary 5/19/2021 and VQ scan negative. MRI brain completed on 5/20/2021 showed no evidence of metastatic disease.     She was seen by Dr. Steele, who felt the patient has received enough 5FU and was transitioned to Irinotecan/Avastin, first dose 7/6/21. She received 1 cycle and then hospitalized and treated for pneumonia. Patient continued with severe respiratory distress and cycle 2 was delayed until resolution of symptoms. She then received cycle 2 on 8/17 and again had significant issues.     Treatment History:     2012: Initially diagnosed with rectal carcinoma.  2015: Discovered metastasis to liver and lung, initially on XELOX but switched to 5-FU single agent.  [id number]: Continued 5-FU with occasional interruptions and dose variations, with occasional localized therapy with ablation to the liver.   7/6/21: Switched to irinotecan and bevacizumab due to concern for marrow toxicity.  8/17/21: Received C2 irinotecan/horacio.    Interval History:  Patient is  here for follow up visit.  She has had worsening shortness of breath and is now requiring oxygen almost continuously. She saw pulmonology this morning and they've discussed bronchoscopy with her, but will start by doing a CT scan of the chest to assess the problem in her chest at this time. She is concerned about being off of therapy due to a rise in her CEA (from 2.5 to 4.2). She feels very fatigued today.    Allergies as of 09/15/2021 - Reviewed 09/15/2021   Allergen Reaction Noted   • Oxaliplatin Anaphylaxis 10/27/2015   • Amoxicillin Rash 08/03/2021   • Codeine  04/05/2011   • Pcn [penicillins] Itching 04/05/2011   • Sulfa drugs Itching 04/05/2011   • Tape Rash 09/04/2013         Current Outpatient Medications:   •  XARELTO 10 MG Tab tablet, Take 1 tablet by mouth once daily, Disp: 30 Tablet, Rfl: 5  •  STIOLTO RESPIMAT 2.5-2.5 MCG/ACT Aero Soln, INHALE 2 PUFFS BY MOUTH ONCE DAILY, Disp: 1 Each, Rfl: 5  •  ondansetron (ZOFRAN) 4 MG Tab tablet, Take 1 tablet by mouth every four hours as needed for Nausea/Vomiting (for nausea, vomiting)., Disp: 30 tablet, Rfl: 6  •  lidocaine-prilocaine (EMLA) 2.5-2.5 % Cream, Apply to port one hour prior to access and cover with plastic wrap., Disp: 1 Each, Rfl: 3  •  albuterol (PROVENTIL) 2.5mg/3ml Nebu Soln solution for nebulization, Take 3 mL by nebulization every four hours as needed for Shortness of Breath., Disp: 120 mL, Rfl: 11  •  albuterol 108 (90 Base) MCG/ACT Aero Soln inhalation aerosol, Inhale 2 Puffs every 6 hours as needed for Shortness of Breath., Disp: 1 Each, Rfl: 11  •  potassium chloride ER (KLOR-CON) 10 MEQ tablet, Take 1 tablet by mouth every day., Disp: 100 tablet, Rfl: 3  •  cyanocobalamin (VITAMIN B-12) 500 MCG Tab, Take 1,000 mcg by mouth every day., Disp: , Rfl:   •  Multiple Vitamins-Minerals (CENTRUM SILVER PO), Take 1 Tab by mouth every day., Disp: , Rfl:   •  Cholecalciferol (VITAMIN D3) 400 UNITS CAPS, Take 1 Cap by mouth every day., Disp: , Rfl:  "  •  loratadine (CLARITIN) 10 MG TABS, Take 10 mg by mouth every day. Indications: Hayfever, Disp: , Rfl:   •  predniSONE (DELTASONE) 10 MG Tab, Take 30 mg x3 days - then 20 mg x3 days - then 10 mg x3 days and stop (Patient not taking: Reported on 9/15/2021), Disp: 18 Tablet, Rfl: 0      Review of Systems:  Review of Systems   Constitutional: Positive for malaise/fatigue. Negative for chills, diaphoresis, fever and weight loss.   HENT: Negative for hearing loss, nosebleeds, sinus pain and sore throat.    Eyes: Negative for blurred vision and double vision.   Respiratory: Positive for cough and shortness of breath. Negative for sputum production and wheezing.    Cardiovascular: Negative for chest pain, palpitations, orthopnea and leg swelling.   Gastrointestinal: Negative for abdominal pain, blood in stool, constipation, diarrhea, heartburn, melena, nausea and vomiting.   Genitourinary: Negative for dysuria, frequency, hematuria and urgency.   Musculoskeletal: Positive for back pain, joint pain and myalgias.   Skin: Negative for itching and rash.   Neurological: Positive for weakness. Negative for dizziness, tingling and headaches.   Endo/Heme/Allergies: Does not bruise/bleed easily.   Psychiatric/Behavioral: The patient is nervous/anxious. The patient does not have insomnia.          Physical Exam:  Vitals:    09/15/21 1355   BP: 102/58   BP Location: Right arm   Patient Position: Sitting   BP Cuff Size: Adult   Pulse: 98   Resp: 12   Temp: 36.2 °C (97.1 °F)   TempSrc: Temporal   SpO2: 98%   Weight: 60.7 kg (133 lb 13.1 oz)   Height: 1.651 m (5' 5\")       DESC; KARNOFSKY SCALE WITH ECOG EQUIVALENT: 50, Requires considerable assistance and frequent medical care (ECOG equivalent 2)    DISTRESS LEVEL: mild distress    Physical Exam  Vitals and nursing note reviewed.   Constitutional:       General: She is awake. She is in acute distress (short of breath, requiring O2).      Appearance: Normal appearance. She is " underweight. She is not ill-appearing, toxic-appearing or diaphoretic.   HENT:      Head: Normocephalic and atraumatic.      Nose: Nose normal. No congestion.      Mouth/Throat:      Pharynx: Oropharynx is clear. No oropharyngeal exudate or posterior oropharyngeal erythema.   Eyes:      General: No scleral icterus.     Extraocular Movements: Extraocular movements intact.      Conjunctiva/sclera: Conjunctivae normal.      Pupils: Pupils are equal, round, and reactive to light.   Cardiovascular:      Rate and Rhythm: Normal rate and regular rhythm.      Pulses: Normal pulses.      Heart sounds: Normal heart sounds. No murmur heard.   No friction rub. No gallop.    Pulmonary:      Effort: Pulmonary effort is normal.      Breath sounds: Decreased air movement present. Examination of the right-lower field reveals rhonchi. Examination of the left-lower field reveals rhonchi. Rhonchi present. No wheezing or rales.   Abdominal:      General: Bowel sounds are normal. There is no distension.      Tenderness: There is no abdominal tenderness.   Musculoskeletal:         General: No deformity. Normal range of motion.      Cervical back: Normal range of motion and neck supple. No tenderness.      Right lower le+ Edema present.      Left lower le+ Edema present.   Lymphadenopathy:      Cervical: No cervical adenopathy.      Upper Body:      Right upper body: No axillary adenopathy.      Left upper body: No axillary adenopathy.      Lower Body: No right inguinal adenopathy. No left inguinal adenopathy.   Skin:     General: Skin is warm and dry.      Coloration: Skin is not jaundiced.      Findings: No erythema or rash.   Neurological:      General: No focal deficit present.      Mental Status: She is alert and oriented to person, place, and time.      Cranial Nerves: Cranial nerves are intact.      Sensory: Sensation is intact.      Motor: Motor function is intact. No weakness.      Gait: Gait is intact.   Psychiatric:          Attention and Perception: Attention normal.         Mood and Affect: Mood normal.         Behavior: Behavior normal. Behavior is cooperative.         Thought Content: Thought content normal.         Judgment: Judgment normal.         Labs:  [facility] 09[facility]14/2021   Component Date Value Ref Range Status   • WBC 09/14/2021 11.8* 4.8 - 10.8 K/uL Final   • RBC 09/14/2021 4.63  4.20 - 5.40 M/uL Final   • Hemoglobin 09/14/2021 12.6  12.0 - 16.0 g/dL Final   • Hematocrit 09/14/2021 40.1  37.0 - 47.0 % Final   • MCV 09/14/2021 86.6  81.4 - 97.8 fL Final   • MCH 09/14/2021 27.2  27.0 - 33.0 pg Final   • MCHC 09/14/2021 31.4* 33.6 - 35.0 g/dL Final   • RDW 09/14/2021 60.6* 35.9 - 50.0 fL Final   • Platelet Count 09/14/2021 373  164 - 446 K/uL Final   • MPV 09/14/2021 10.2  9.0 - 12.9 fL Final   • Neutrophils-Polys 09/14/2021 74.80* 44.00 - 72.00 % Final   • Lymphocytes 09/14/2021 10.10* 22.00 - 41.00 % Final   • Monocytes 09/14/2021 9.90  0.00 - 13.40 % Final   • Eosinophils 09/14/2021 2.10  0.00 - 6.90 % Final   • Basophils 09/14/2021 1.10  0.00 - 1.80 % Final   • Immature Granulocytes 09/14/2021 2.00* 0.00 - 0.90 % Final   • Nucleated RBC 09/14/2021 0.00  /100 WBC Final   • Neutrophils (Absolute) 09/14/2021 8.81* 2.00 - 7.15 K/uL Final    Includes immature neutrophils, if present.   • Lymphs (Absolute) 09/14/2021 1.19  1.00 - 4.80 K/uL Final   • Monos (Absolute) 09/14/2021 1.17* 0.00 - 0.85 K/uL Final   • Eos (Absolute) 09/14/2021 0.25  0.00 - 0.51 K/uL Final   • Baso (Absolute) 09/14/2021 0.13* 0.00 - 0.12 K/uL Final   • Immature Granulocytes (abs) 09/14/2021 0.23* 0.00 - 0.11 K/uL Final   • NRBC (Absolute) 09/14/2021 0.00  K/uL Final   • Sodium 09/14/2021 129* 135 - 145 mmol/L Final   • Potassium 09/14/2021 4.4  3.6 - 5.5 mmol/L Final   • Chloride 09/14/2021 93* 96 - 112 mmol/L Final   • Co2 09/14/2021 19* 20 - 33 mmol/L Final   • Anion Gap 09/14/2021 17.0* 7.0 - 16.0 Final   • Glucose 09/14/2021  151* 65 - 99 mg/dL Final   • Bun 09/14/2021 27* 8 - 22 mg/dL Final   • Creatinine 09/14/2021 1.20  0.50 - 1.40 mg/dL Final   • Calcium 09/14/2021 9.8  8.5 - 10.5 mg/dL Final   • AST(SGOT) 09/14/2021 31  12 - 45 U/L Final   • ALT(SGPT) 09/14/2021 28  2 - 50 U/L Final   • Alkaline Phosphatase 09/14/2021 243* 30 - 99 U/L Final   • Total Bilirubin 09/14/2021 0.4  0.1 - 1.5 mg/dL Final   • Albumin 09/14/2021 3.4  3.2 - 4.9 g/dL Final   • Total Protein 09/14/2021 7.2  6.0 - 8.2 g/dL Final   • Globulin 09/14/2021 3.8* 1.9 - 3.5 g/dL Final   • A-G Ratio 09/14/2021 0.9  g/dL Final   • Carcinoembryonic Antigen 09/14/2021 4.2* 0.0 - 3.0 ng/mL Final    Comment: Performed using Roche ashleigh e immunoassay analyzer. CEA values determined on  patient samples by different testing procedures cannot be directly compared  with one another and could be the cause of erroneous medical  interpretations. If there is a change in the CEA assay procedure used while  monitoring therapy, then new baselines may need to be established when  comparing previous results.     • GFR If  09/14/2021 52* >60 mL/min/1.73 m 2 Final   • GFR If Non  09/14/2021 43* >60 mL/min/1.73 m 2 Final       Imaging:     All listed images below have been independently reviewed by me. I agree with the findings as summarized below:    DX-CHEST-2 VIEWS    Result Date: 8/26/2021 8/26/2021 3:45 PM HISTORY/REASON FOR EXAM:  Shortness of Breath TECHNIQUE/EXAM DESCRIPTION: PA and lateral views of the chest. COMPARISON:  Chest x-ray 8/5/2021 FINDINGS: There is a left IJ Port-A-Cath with its distal tip extending into the right atrium. There is a slight curvilinear opacity in the right lung base. The cardiac silhouette is normal in size. No effusions or pneumothoraces are present. There are no significant osseous abnormalities. The visualized portions of the upper abdomen are within normal limits.     1.  Slight right basilar atelectasis. 2.  Left IJ  Port-A-Cath in place.      Pathology:      ADDENDUM:     This case is being addended to document that slides of our case were   sent to [facility] and their report was generated. Please see separate [facility]   report.     Addendum signed by:  Norma Anderson MD   Addendum date:  07/27/2015   Original Report Date:  09/28/2012                                  ** ADDENDUM **     ADDENDUM:     At the request of Dr. Radha Cotter, a tissue block (A6) is sent to   [facility] for KRAS extended analysis.     Addendum Signed By:  Yadira Mora MD   Addendum Date:  07/17/2015   Original Report Date:  09/28/2012                                  ** ADDENDUM **     ADDENDUM:     This case is being addended to report the results of IHC studies for   mismatch repair proteins to evaluate for Peacock syndrome. The technical   component of the IHC studies was performed by [facility], and   the interpretation was performed by Dr. Anderson at [facility]. All four   mismatch repair proteins are expressed in the tumor cells. These   findings make Peacock syndrome unlikely. These results are also reported   in the IHC section below.     Addendum Signed By: Norma Anderson M.D.   Addendum Date:  10/3/2012   Original Report Date:  09/28/2012     FINAL DIAGNOSIS:     A. Rectum:          Invasive low-grade (well-differentiated) adenocarcinoma arising           in the background of a tubular adenoma.          Invasion into the superficial most aspect of the muscularis           propria is present (pT2).          The tumor is located 1 cm above the peritoneal reflection in the           upper rectum (intraperitoneal rectum).          Surgical margins free of involvement by at least 1 cm.          Thirteen benign lymph nodes identified in this specimen (0/13).          See synoptic report below for complete details.   B. Rectal donuts:          Fragments of benign colonic mucosa consistent with anastomotic           rings.   C. Ileocolic:           Segment of distal ileum, cecum, and proximal ascending colon           demonstrating two tubular adenomas in the cecum. No high-grade           dysplasia identified.          Seventeen benign lymph nodes identified in this specimen (0/17).          No appendix identified.     Synoptic Report for Primary Carcinomas of Colon/Rectum:          -Specimen: Rectum, terminal ileum, cecum, and proximal ascending         colon.        -Procedure: Low anterior resection and right hemicolectomy.        -Specimen Length: 11.5 cm (rectum specimen).        -Tumor Site: Upper rectum.        -Tumor Location: Tumor is located 1 cm above the peritoneal         reflection.        -Tumor Size: 1.2 cm in maximal dimension.        -Macroscopic Tumor Perforation: Not identified.        -Histologic Type: Adenocarcinoma.        -Histologic Grade: Low-grade (well-differentiated).        -Microscopic Tumor Extension: Tumor focally invades the         superficial most aspect of the muscularis propria.        -Margins:         Proximal Margin: Uninvolved by invasive carcinoma.         Distal Margin: Uninvolved by invasive carcinoma.         Circumferential (Radial) or Mesenteric Margin: Uninvolved by          invasive carcinoma.         Lateral Margin: Not applicable.         Distance from closest margin: 1 cm, distal.        -Treatment Effect: No prior treatment.        -Lymph-Vascular Invasion: Not identified.        -Perineural Invasion: Not identified.        -Tumor Deposits(discontinuous extramural extension): Not         identified.        -Type of Polyp in Which Invasive Carcinoma Arose: Tubular adenoma.        -Pathologic Staging:         Primary Tumor: pT2         Regional Lymph Nodes: pN0           Number of lymph nodes examined: 30           Number of lymph nodes involved: 0         Distant Metastasis: Not applicable.        -Additional Pathologic Findings: Tubular adenomas (2) present in         cecum.        -Ancillary Studies:          Immunohistochemistry studies for mismatched repair proteins:          -MLH1: Expressed.          -MSH2: Expressed.          -MSH6: Expressed.          -PMS2: Expressed.     Assessment & Plan:  1. Rectal cancer (HCC)  CBC WITH DIFFERENTIAL    Comp Metabolic Panel    CEA     This is an 81-year-old  woman with metastatic rectal adenocarcinoma.  She has been on treatment for very long time with 5-FU, and was pulled off due to concern for toxicity.  She has most recently been on irinotecan and bevacizumab, but has not been able to tolerate this in light of a worsening pulmonary process.  She is being managed by pulmonology with plans for scans and possible bronchoscopy.    At this time, due to her pulmonary process causing worsening shortness of breath, I would not pursue systemic treatment until the underlying etiology of this pulmonary process is identified and her performance status improves.  Her CEA will continue to be monitored, but unless the pulmonary symptoms improve, any systemic treatment may ultimately cause more harm than benefit.    If she is able to tolerate further systemic therapy in the future, I would opt for resuming single agent 5-FU.  We will plan on seeing her again in 3 weeks, after she has another consultation with pulmonology, to determine next steps in care.    1. Hold systemic therapy at this time until pulmonary issue is diagnosed (would likely need a bronchoscopy for this) and performance status improves.  2. Monitor CEA along with CBC and CMP.  3. If able to restart therapy, would opt to again treat with single agent 5-FU.  4. Return to clinic in 3 weeks after follow up with pulmonology.    Any questions and concerns raised by the patient were answered to the best of my ability. Thank you for allowing me to participate in the care for this patient. Please feel free to contact me for any questions or concerns.       Total time spent on chart review, clinic encounter, and  documentation: 34 minutes.

## 2021-09-18 ASSESSMENT — ENCOUNTER SYMPTOMS
SHORTNESS OF BREATH: 1
FEVER: 0
CHILLS: 0
SPUTUM PRODUCTION: 1
SINUS PAIN: 0
HEADACHES: 0
PALPITATIONS: 0
COUGH: 1
TREMORS: 0
SORE THROAT: 0
WEIGHT LOSS: 1
DIZZINESS: 0
WHEEZING: 0
INSOMNIA: 0
ORTHOPNEA: 1
ROS GI COMMENTS: UPPER AND LOWER DENTURES, NO DIFFICULTY SWALLOWING
HEARTBURN: 0

## 2021-09-18 NOTE — PROGRESS NOTES
CC: Persistent cough primarily early a.m. or late at night, increases with stress.    HPI:  Karlene Walters is a 81 y.o. year old female here today for follow-up on severe shortness of breath and cough noted in oncology clinic on 8/26/2021.  She was also noted to have low oxygen sats down to 62% at rest and was changed to continuous O2 at 2 to 3 L rather than as needed exertion and nighttime.  Last seen in clinic 8/5/2021 post hospitalization for aspiration pneumonia, chronic cough x4 months and hypoxia.  Previously seen in clinic 6/21/2021 by Dr. Jannet Ventura.  Patient is a never smoker.    Pertinent past medical history includes metastatic rectal cancer diagnosed in 2012 s/p resection with recurrence in the liver 2015 for which she has had multiple ablations to the right dome of the liver.  History of PE chronically anticoagulated.  Per Dr. Ventura's note suspicion of GERD with retrograde or anterior grade and retrograde aspiration.  Referral was made to speech.  Patient was hospitalized 719 2021 x 4 days for aspiration pneumonia.    Reviewed in clinic vitals including /70, , O2 sat 98% on 2 L at rest.    Reviewed home medication regimen includes albuterol nebulizer twice daily, reports benefit with albuterol and Stiolto.  Remains on Xarelto.  Stopped chemo August 31, treated with levofoxacillin 8/26 completed 9/3/2021 treated with prednisone 9/4/2021.    Reviewed most recent imaging including MRI of the brain with and without contrast obtained 5/20/2021 demonstrating mild cerebral volume loss, mild chronic microvascular ischemic disease, no abnormal parenchymal or meningeal contrast enhancement to suggest metastases.    Chest x-ray obtained 8/26/2021 demonstrated slight right basilar atelectasis, left IJ Port-A-Cath in place.    Chest x-ray obtained 8/5/2021 demonstrated no acute cardiopulmonary abnormalities.    CT chest obtained 7/20/2021 demonstrated linear nodular infiltrates bilateral lung  bases, minimal scattered patchy groundglass pulmonary infiltrates, no acute thoracic abnormality.    CT scan obtained 5/18/2021, noted wedge-shaped soft tissue opacity right lower lobe measuring 11 mm x 2.1 cm increased in size from previous exam, redemonstrated large ablation cavity near dome of liver unchanged in size or appearance since previous exam, no lymphadenopathy noted, postoperative changes right lobe of liver.    VQ study obtained 6/4/2021 was low probability.    Echocardiogram obtained 3/10/2021 demonstrated normal left ventricular chamber size, wall thickness, systolic and diastolic function.  LVEF estimated 65%.  Normal right ventricular size and systolic function.  Trace mitral regurgitation, mild tricuspid regurgitation with estimated RVSP of 25 mmHg.    Pulmonary function testing obtained 6/24/2021 demonstrated FEV1 of 2.01 L or 101% predicted, FVC of 2.74 L or 105% predicted, FEV1/FVC ratio 73, residual volume 88% predicted, TLC 93% predicted, DLCO 84% predicted. Per pulmonologist interpretation baseline spirometry showed normal airflows, lung volumes were within normal limits, diffusion capacity within normal limits, normal PFT with normal flow volume loop.    Review of Systems   Constitutional: Positive for malaise/fatigue (Moderate) and weight loss (Due to decreased appetite). Negative for chills and fever.   HENT: Positive for hearing loss and nosebleeds (Occasional). Negative for congestion, sinus pain, sore throat and tinnitus.    Respiratory: Positive for cough (Improved since antibiotics but still present), sputum production (Yellow) and shortness of breath (Activity). Negative for wheezing.    Cardiovascular: Positive for orthopnea (Occasional) and leg swelling (Trace). Negative for palpitations (Occasional).   Gastrointestinal: Negative for heartburn.        Upper and lower dentures, no difficulty swallowing   Neurological: Negative for dizziness, tremors and headaches.    Psychiatric/Behavioral: The patient does not have insomnia.        Past Medical History:   Diagnosis Date   • Adverse effect of anesthesia     elevated BP postop   • Anesthesia    • Arrhythmia     occasional   • Blood clotting disorder (HCC) 08/2015    bilat PE    • Blood transfusion 1957   • Breath shortness     pt reports d/t chemo treatment; no O2; with moderate exertion; no c/o at this time   • Cancer (HCC) 09/2012    rectal cancer,  Liver CA-2015   • CATARACT     removed b/l   • Chemotherapy-induced neutropenia (HCC) 9/28/2016   • Chickenpox     history of   • Colon cancer (HCC)    • Dental disorder     dentures UPPERS AND LOWERS   • Elevated alkaline phosphatase level 11/15/2017   • Emphysema of lung (HCC)     COPD   • Fall    • Khmer measles     history of   • Heart murmur     as a child   • Hemorrhagic disorder (HCC)     on Xarelto    • High cholesterol    • Hypercholesteremia    • Hypertension     no meds currently    • Ileostomy in place (HCC)    • Ileostomy in place (HCC)    • Indigestion    • Influenza     history of   • Lightheadedness 9/17/2015   • Mumps     history of   • Obstruction     ileostomy   • Other specified symptom associated with female genital organs     HYSTERECTOMY 1993   • Pneumonia 05/2017    tx'd with antibx   • Pulmonary embolism (HCC)    • 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)    • Renal insufficiency 3/14/2016   • Restless legs 5/6/2020   • Routine general medical examination at a health care facility 3/14/2016    3/14/16   • Seasonal allergies    • Swelling of both ankles     Lasix PRN   • Tonsillitis        Past Surgical History:   Procedure Laterality Date   • HEPATIC ABLATION  5/28/2020    Procedure: ABLATION, LESION, LIVER-TRISEGMENTECTOMY, MICROWAVE ABLATION, INTRA OPERATIVE ULTRA SOUND, SIMPLE RESECTED / REPAIR OF DIAPHRAGM;  Surgeon: Kit West M.D.;  Location: SURGERY [facility];  Service: General   • HEPATIC ABLATION LAPAROSCOPIC   11/15/2018    Procedure: HEPATIC ABLATION LAPAROSCOPIC.- SEGMENT 7/8;  Surgeon: Kit West M.D.;  Location: SURGERY [facility];  Service: General   • COLONOSCOPY WITH BIOPSY  8/23/2015    Procedure: COLONOSCOPY WITH BIOPSY;  Surgeon: Wilbur Glasgow M.D.;  Location: ENDOSCOPY [facility];  Service:    • HEPATIC ABLATION LAPAROSCOPIC  7/6/2015    Procedure: HEPATIC ABLATION LAPAROSCOPIC.;  Surgeon: Kit West M.D.;  Location: SURGERY [facility];  Service:    • CATH PLACEMENT Left 7/6/2015    Procedure: CATH PLACEMENT CEPHALIC POWERPORT;  Surgeon: Kit West M.D.;  Location: SURGERY [facility];  Service:    • FISTULA IN ANO REPAIR  1/28/2015    Performed by Kolton Montgomery M.D. at [facility]   • PERINEAL PROCEDURE  1/28/2015    Performed by Kolton Montgomery M.D. at SURGERY [facility]   • FISTULA IN ANO REPAIR  10/15/2014    Performed by Kolton Montgomery M.D. at [facility]   • PERINEAL PROCEDURE  10/15/2014    Performed by Kolton Montgomery M.D. at SURGERY [facility]   • IRRIGATION & DEBRIDEMENT GENERAL  2/19/2014    Performed by Kolton Montgomery M.D. at [facility]   • FLAP GRAFT  2/19/2014    Performed by Kolton Montgomery M.D. at [facility]   • PERINEAL PROCEDURE  12/18/2013    Performed by Kolton Montgomery M.D. at [facility]   • MYOCUTANEOUS FLAP  12/18/2013    Performed by Kolton Montgomery M.D. at [facility]   • IRRIGATION & DEBRIDEMENT GENERAL  10/23/2013    Performed by Kolton Montgomery M.D. at [facility]   • FISTULA IN ANO REPAIR  9/4/2013    Performed by Kolton Montgomery M.D. at [facility]   • FLAP ROTATION  9/4/2013    Performed by Kolton Montgomery M.D. at [facility]   • RECOVERY  8/26/2013    Performed by Cath-Recovery Surgery at SUR[facility]ERY [facility]   • BIOPSY GENERAL  7/17/2013    Performed by Kolton Montgomery M.D. at SURGERY [facility]   •  PROCTOSCOPY  7/17/2013    Performed by Kolton Montgomery M.D. at SURGERY [facility]   • FISTULA IN ANO REPAIR  2/27/2013    Performed by Kolton Montgomery M.D. at SURGERY [facility]   • SIGMOIDOSCOPY  2/27/2013    Performed by Kolton Montgomery M.D. at SURGERY [facility]   • LAPAROSCOPY  10/8/2012    Performed by Kolton Montgomery M.D. at SURGERY [facility]   • ILEO LOOP DIVERSION  10/8/2012    Performed by Kolton Montgomery M.D. at SURGERY [facility]   • COLECTOMY  9/26/2012    Performed by Kolton Montgomery M.D. at SURGERY [facility]   • COLONOSCOPY FLEX W/ENDO US  9/20/2012    Performed by Harshil Bolton M.D. at SURGERY [facility]   • OTHER  2009    left eye torn retina repair   • CATARACT EXTRACTION WITH IOL  2004    bilateral   • ABDOMINAL HYSTERECTOMY TOTAL  1983   • CYST EXCISION  1972    ovarian   • COLON RESECTION     • EYE SURGERY      cataracts   • HYSTERECTOMY LAPAROSCOPY     • PB REMV 2ND CATARACT,CORN-SCLER SECTN     • TONSILLECTOMY         Family History   Problem Relation Age of Onset   • Heart Disease Mother    • Heart Failure Mother    • Hypertension Mother    • Hyperlipidemia Mother    • Cancer Mother    • Cancer Maternal Aunt 60        breast ca   • Lung Disease Brother         COPD/Emphysema/Smoker   • Cancer Brother         prostate cancer   • Alcohol/Drug Brother         Alcohol   • Kidney Disease Father         renal failure       Social History     Socioeconomic History   • Marital status:      Spouse name: Not on file   • Number of children: Not on file   • Years of education: Not on file   • Highest education level: Not on file   Occupational History   • Not on file   Tobacco Use   • Smoking status: Never Smoker   • Smokeless tobacco: Never Used   Vaping Use   • Vaping Use: Never used   Substance and Sexual Activity   • Alcohol use: No   • Drug use: No   • Sexual activity: Never     Partners: Male     Birth control/protection: Post-Menopausal   Other Topics Concern   •  "Primary/coprimary nurse & associates Not Asked   • Family contact information Not Asked   • OK to release patient information to the following Not Asked   • Patient preferred routine/Privacy concerns Not Asked   • Patient likes and dislikes Not Asked   • Participating In Research Study Not Asked   • Miscellaneous Not Asked   Social History Narrative   • Not on file     Social Determinants of Health     Financial Resource Strain:    • Difficulty of Paying Living Expenses:    Food Insecurity:    • Worried About Running Out of Food in the Last Year:    • Ran Out of Food in the Last Year:    Transportation Needs:    • Lack of Transportation (Medical):    • Lack of Transportation (Non-Medical):    Physical Activity:    • Days of Exercise per Week:    • Minutes of Exercise per Session:    Stress:    • Feeling of Stress :    Social Connections:    • Frequency of Communication with Friends and Family:    • Frequency of Social Gatherings with Friends and Family:    • Attends [employer] Services:    • Active Member of Clubs or Organizations:    • Attends Club or Organization Meetings:    • Marital Status:    Intimate Partner Violence:    • Fear of Current or Ex-Partner:    • Emotionally Abused:    • Physically Abused:    • Sexually Abused:        Allergies as of 09/15/2021 - Reviewed 09/15/2021   Allergen Reaction Noted   • Oxaliplatin Anaphylaxis 10/27/2015   • Amoxicillin Rash 08/03/2021   • Codeine  04/05/2011   • Pcn [penicillins] Itching 04/05/2011   • Sulfa drugs Itching 04/05/2011   • Tape Rash 09/04/2013        @Vital signs for this encounter:  Vitals:    09/15/21 1046   Height: 1.651 m (5' 5\")   Weight: 59.9 kg (132 lb)   Weight % change since last entry.: 0 %   BP: 104/70   Pulse: (!) 108   BMI (Calculated): 21.97   Resp: 18   Temp: 36.3 °C (97.4 °F)   TempSrc: Temporal       Current medications as of today   Current Outpatient Medications   Medication Sig Dispense Refill   • XARELTO 10 MG Tab tablet Take 1 tablet by " mouth once daily 30 Tablet 5   • STIOLTO RESPIMAT 2.5-2.5 MCG/ACT Aero Soln INHALE 2 PUFFS BY MOUTH ONCE DAILY 1 Each 5   • ondansetron (ZOFRAN) 4 MG Tab tablet Take 1 tablet by mouth every four hours as needed for Nausea/Vomiting (for nausea, vomiting). 30 tablet 6   • albuterol (PROVENTIL) 2.5mg/3ml Nebu Soln solution for nebulization Take 3 mL by nebulization every four hours as needed for Shortness of Breath. 120 mL 11   • albuterol 108 (90 Base) MCG/ACT Aero Soln inhalation aerosol Inhale 2 Puffs every 6 hours as needed for Shortness of Breath. 1 Each 11   • potassium chloride ER (KLOR-CON) 10 MEQ tablet Take 1 tablet by mouth every day. 100 tablet 3   • cyanocobalamin (VITAMIN B-12) 500 MCG Tab Take 1,000 mcg by mouth every day.     • Multiple Vitamins-Minerals (CENTRUM SILVER PO) Take 1 Tab by mouth every day.     • Cholecalciferol (VITAMIN D3) 400 UNITS CAPS Take 1 Cap by mouth every day.     • loratadine (CLARITIN) 10 MG TABS Take 10 mg by mouth every day. Indications: Hayfever     • predniSONE (DELTASONE) 10 MG Tab Take 30 mg x3 days - then 20 mg x3 days - then 10 mg x3 days and stop (Patient not taking: Reported on 9/15/2021) 18 Tablet 0   • lidocaine-prilocaine (EMLA) 2.5-2.5 % Cream Apply to port one hour prior to access and cover with plastic wrap. 1 Each 3     No current facility-administered medications for this visit.         Physical Exam:   Gen:           Alert and oriented, No apparent distress. Mood and affect appropriate, normal interaction with provider.  Eyes:          sclere white, conjunctive moist.  Hearing:     Grossly intact.  Dentition:    Upper and lower dentures  Oropharynx:   Tongue normal, posterior pharynx without erythema or exudate.  Neck:        Supple, trachea midline, no masses.  Respiratory Effort: No intercostal retractions or use of accessory muscles.   Lung Auscultation:      Decreased bases; no rales, rhonchi or wheezing.  CV:            Regular rate and rhythm. No edema.  No murmurs, rubs or gallops.  Digits, Nails, Ext: No clubbing, cyanosis, petechiae, or nodes.   Skin:        No rashes, lesions or ulcers noted on back or exposed skin surfaces.                     Assessment:  1. Abnormal CT of the chest  CT-CHEST (THORAX) W/O   2. Cough  CT-CHEST (THORAX) W/O    CULTURE RESPIRATORY W/ GRM STN   3. History of pulmonary embolus (PE)         Immunizations:    Flu: 10/27/2020  Pneumovax 23: 9/5/2006  Prevnar 13: 2/2/2018  Moderna co-v2 vaccine: 2/16/2021, 1/19/2020    Plan:  81 y.o. year old female here today for follow-up on severe shortness of breath and cough noted in oncology clinic on 8/26/2021.  She was also noted to have low oxygen sats down to 62% at rest and was changed to continuous O2 at 2 to 3 L rather than as needed exertion and nighttime. Last seen in clinic 8/5/2021 post hospitalization for aspiration pneumonia, chronic cough x4 months and hypoxia.  Previously seen in clinic 6/21/2021 by Dr. Jannet Ventura.  Patient was also previously seen by ANDREW Zee. Patient is a never smoker.    Pertinent past medical history includes metastatic rectal cancer diagnosed in 2012 s/p resection with recurrence in the liver 2015 for which she has had multiple ablations to the right dome of the liver.  History of PE chronically anticoagulated.  Per Dr. Ventura's note suspicion of GERD with retrograde or anterior grade and retrograde aspiration.  Referral was made to speech.  Patient was hospitalized 719 2021 x 4 days for aspiration pneumonia.    Reviewed in clinic vitals including /70, , O2 sat 98% on 2 L at rest.    Reviewed home medication regimen includes albuterol nebulizer twice daily, reports benefit with albuterol and Stiolto.  Remains on Xarelto.  Stopped chemo August 31, treated with levofoxacillin 8/26 completed 9/3/2021 treated with prednisone 9/4/2021.    Cough: Moderate amount of yellow production, improved at current time, some benefit seen with Stiolto  and albuterol, using nebulizer twice daily. Obtain sputum culture and Gram stain.  Provided with sputum cups.  Send requisition forms.    Abnormal chest CT:   Obtain follow-up chest CT.    History of PE: On blood thinners, V/Q study low probability    Does travel from [place] but prefers all testing done at [facility].  Has been vaccinated against COVID-19 x2.  Follow-up as scheduled with Dr. Ventura on 9/30/2021.      This dictation was created using voice recognition software. The accuracy of the dictation is limited to the abilities of the software. I expect there may be some errors of grammar and possibly content.

## 2021-09-20 ENCOUNTER — PATIENT MESSAGE (OUTPATIENT)
Dept: SLEEP MEDICINE | Facility: MEDICAL CENTER | Age: 81
End: 2021-09-20

## 2021-09-20 DIAGNOSIS — R05.9 COUGH: ICD-10-CM

## 2021-09-20 RX ORDER — AZITHROMYCIN 250 MG/1
TABLET, FILM COATED ORAL
Qty: 6 TABLET | Refills: 0 | Status: SHIPPED | OUTPATIENT
Start: 2021-09-20 | End: 2021-09-30

## 2021-09-20 NOTE — PATIENT COMMUNICATION
Called patient to give them instruction on sputum culture. Patient mentioned things in MyChart message, advised patient to go to ER or urgent care if they feel that it gets worse or if it conues.

## 2021-09-20 NOTE — TELEPHONE ENCOUNTER
From: Karlene Walters  To: Physician Assistant Macie Olivera  Sent: 9/20/2021 7:22 AM PDT  Subject: Severe coughing and yellow gunk has returned again    Good morning, the last couple of days the severe cough has returned along with the yellow mucus. I cough so violent that I end up vomiting. I also am having difficulty breathing and am using oxygen 24/7. When the severe coughing begins I really can't breath. At 3:30 am my blood pressure was 101/54 with pulse of 97, and 130/78 with pulse of 111. I am not scheduled to see a doctor until next week, Dr Ventura. I feel that what I have been told the last three times was pneumonia has returned. My oxygen just read 83 and I am on oxygen. Just turned the pressure upto 3. I don't know who to contact or what to do. Thank you.

## 2021-09-21 ENCOUNTER — HOSPITAL ENCOUNTER (OUTPATIENT)
Facility: MEDICAL CENTER | Age: 81
End: 2021-09-21
Attending: PHYSICIAN ASSISTANT
Payer: MEDICARE

## 2021-09-21 ENCOUNTER — SLEEP CENTER VISIT (OUTPATIENT)
Dept: SLEEP MEDICINE | Facility: MEDICAL CENTER | Age: 81
End: 2021-09-21
Payer: MEDICARE

## 2021-09-21 VITALS
OXYGEN SATURATION: 96 % | WEIGHT: 134 LBS | HEIGHT: 65 IN | BODY MASS INDEX: 22.33 KG/M2 | DIASTOLIC BLOOD PRESSURE: 60 MMHG | RESPIRATION RATE: 16 BRPM | SYSTOLIC BLOOD PRESSURE: 106 MMHG | HEART RATE: 116 BPM

## 2021-09-21 DIAGNOSIS — R05.9 COUGH: ICD-10-CM

## 2021-09-21 DIAGNOSIS — R93.89 ABNORMAL CT OF THE CHEST: ICD-10-CM

## 2021-09-21 DIAGNOSIS — R09.02 HYPOXIA: ICD-10-CM

## 2021-09-21 LAB
GRAM STN SPEC: NORMAL
SIGNIFICANT IND 70042: NORMAL
SITE SITE: NORMAL
SOURCE SOURCE: NORMAL

## 2021-09-21 PROCEDURE — 99214 OFFICE O/P EST MOD 30 MIN: CPT | Performed by: PHYSICIAN ASSISTANT

## 2021-09-21 PROCEDURE — 87205 SMEAR GRAM STAIN: CPT

## 2021-09-21 ASSESSMENT — ENCOUNTER SYMPTOMS
ORTHOPNEA: 1
DIZZINESS: 0
COUGH: 1
CHILLS: 0
HEARTBURN: 0
TREMORS: 0
SINUS PAIN: 0
HEADACHES: 0
SORE THROAT: 1
SPUTUM PRODUCTION: 1
SHORTNESS OF BREATH: 1
PALPITATIONS: 1
INSOMNIA: 1
FEVER: 0
WHEEZING: 0
WEIGHT LOSS: 0

## 2021-09-21 ASSESSMENT — FIBROSIS 4 INDEX: FIB4 SCORE: 1.27

## 2021-09-21 NOTE — PROGRESS NOTES
CC: Cough congestion    HPI:  Karlene Walters is a 81 y.o. year old female here today for follow-up on increased cough and congestion. Last seen in clinic 9/15/2021.  Patient and her  live in [place].  She has had increased cough times about 4 months or so.  History of rectal cancer with metastasis to liver.  Current chemo regimen has been suspended due to respiratory issues.  She is currently on O2 at 2 L continuous to 3 L pulsed.  She has reported decreased oxygenation at times.  Oxygen needs are stable in clinic.  Never smoker.    Pertinent past medical history includes aspiration pneumonia, previously seen 6/21/2021 by Dr. Jannet Ventura.  She is a never smoker.  She did undergo resection for her rectal cancer in 2012, recurrence liver cancer 2015 for which she has required multiple ablations to the right dome of the liver.  History of PE chronically anticoagulated, falls, dehydration, acute renal failure in July 2021, hyponatremia.  Per Dr. Ventura's note suspicion for retrograde or anterior grade/retrograde aspiration.  Patient denies heartburn but reports large pills get stuck.    Reviewed in clinic vitals including /68, , afebrile, O2 sat 96 on 3 L pulsed.    Reviewed home medication regimen including Stiolto, albuterol nebulizer and inhaler and loratadine.  She is using albuterol twice times per day with stated benefit.  She does have Zofran.    Reviewed most recent imaging including CT scan obtained 9/23/2021 demonstrating 9.9 x 8.1 x 3.1 cm fluid collection on dura and compressing the right diaphragm, adjacent to patient's known right hepatic mass.  Persistent but overall decreased bilateral dependent chronic appearing airspace disease greater in the right lower lobe overlying subdiaphragmatic fluid collection.  This imaging was obtained after office visit.    Chest x-ray 8/26/2021 demonstrated slight right basilar atelectasis.    CT scan obtained 7/20/2021 demonstrated linear  nodular infiltrates bilateral lung bases, minimal scattered patchy groundglass pulmonary infiltrates.  Atherosclerosis and atherosclerotic coronary artery disease.    CT scan obtained 5/18/2021 demonstrated wedge-shaped soft tissue opacity right lower lobe measuring 11 mm x 2.1 cm increased in size from previous exam, redemonstrated large ablation cavity near the dome of the liver unchanged in size or appearance since previous exam no lymphadenopathy noted, postoperative changes right lobe of the liver.    V/Q study obtained 6/4/2021 was low probability.    Echocardiogram obtained 3/10/2021 demonstrated normal left ventricular chamber size, wall thickness, systolic and diastolic function.  LVEF estimated 65%.  Normal right ventricular size and systolic function.  Trace mitral regurgitation, mild tricuspid regurgitation with estimated RVSP of 25 mmHg.    Pulmonary function testing obtained 6/24/2021 demonstrated FEV1 of 2.01 L or 101% predicted, FVC of 2.74 L or 105% predicted, FEV1/FVC ratio of 73, residual volume 88% predicted, TLC 93% predicted, DLCO 84% predicted.  Per pulmonologist interpretation normal airflows on baseline spirometry, lung volumes within normal limits, diffusion capacity also within normal limits.  Normal pulmonary function testing with normal flow volume loop noted.      Review of Systems   Constitutional: Positive for malaise/fatigue. Negative for chills, fever and weight loss.   HENT: Positive for sore throat (coughing ) and tinnitus. Negative for congestion, hearing loss, nosebleeds and sinus pain.    Respiratory: Positive for cough, sputum production (yellow ) and shortness of breath. Negative for wheezing.    Cardiovascular: Positive for palpitations, orthopnea and leg swelling (trace left ). Negative for chest pain.   Gastrointestinal: Negative for heartburn.        Upper and lower, larger pills get stuck    Neurological: Negative for dizziness, tremors and headaches.    Psychiatric/Behavioral: The patient has insomnia.        Past Medical History:   Diagnosis Date   • Adverse effect of anesthesia     elevated BP postop   • Anesthesia    • Arrhythmia     occasional   • Blood clotting disorder (HCC) 08/2015    bilat PE    • Blood transfusion 1957   • Breath shortness     pt reports d/t chemo treatment; no O2; with moderate exertion; no c/o at this time   • Cancer (HCC) 09/2012    rectal cancer,  Liver CA-2015   • CATARACT     removed b/l   • Chemotherapy-induced neutropenia (HCC) 9/28/2016   • Chickenpox     history of   • Colon cancer (HCC)    • Dental disorder     dentures UPPERS AND LOWERS   • Elevated alkaline phosphatase level 11/15/2017   • Emphysema of lung (HCC)     COPD   • Fall    • Welsh measles     history of   • Heart murmur     as a child   • Hemorrhagic disorder (HCC)     on Xarelto    • High cholesterol    • Hypercholesteremia    • Hypertension     no meds currently    • Ileostomy in place (HCC)    • Ileostomy in place (HCC)    • Indigestion    • Influenza     history of   • Lightheadedness 9/17/2015   • Mumps     history of   • Obstruction     ileostomy   • Other specified symptom associated with female genital organs     HYSTERECTOMY 1993   • Pneumonia 05/2017    tx'd with antibx   • Pulmonary embolism (HCC)    • 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)    • Renal insufficiency 3/14/2016   • Restless legs 5/6/2020   • Routine general medical examination at a health care facility 3/14/2016    3/14/16   • Seasonal allergies    • Swelling of both ankles     Lasix PRN   • Tonsillitis        Past Surgical History:   Procedure Laterality Date   • HEPATIC ABLATION  5/28/2020    Procedure: ABLATION, LESION, LIVER-TRISEGMENTECTOMY, MICROWAVE ABLATION, INTRA OPERATIVE ULTRA SOUND, SIMPLE RESECTED / REPAIR OF DIAPHRAGM;  Surgeon: Kit West M.D.;  Location: SURGERY [facility];  Service: General   • HEPATIC ABLATION LAPAROSCOPIC  11/15/2018     Procedure: HEPATIC ABLATION LAPAROSCOPIC.- SEGMENT 7/8;  Surgeon: Kit West M.D.;  Location: SURGERY [facility];  Service: General   • COLONOSCOPY WITH BIOPSY  8/23/2015    Procedure: COLONOSCOPY WITH BIOPSY;  Surgeon: Wilbur Glasgow M.D.;  Location: ENDOSCOPY [facility];  Service:    • HEPATIC ABLATION LAPAROSCOPIC  7/6/2015    Procedure: HEPATIC ABLATION LAPAROSCOPIC.;  Surgeon: Kit West M.D.;  Location: SURGERY [facility];  Service:    • CATH PLACEMENT Left 7/6/2015    Procedure: CATH PLACEMENT CEPHALIC POWERPORT;  Surgeon: Kit West M.D.;  Location: SURGERY [facility];  Service:    • FISTULA IN ANO REPAIR  1/28/2015    Performed by Kolton Montgomery M.D. at [facility]   • PERINEAL PROCEDURE  1/28/2015    Performed by Kolton Montgomery M.D. at SURGERY [facility]   • FISTULA IN ANO REPAIR  10/15/2014    Performed by Kolton Montgomery M.D. at SURGERY [facility]   • PERINEAL PROCEDURE  10/15/2014    Performed by Kolton Montgomery M.D. at SURGERY [facility]   • IRRIGATION & DEBRIDEMENT GENERAL  2/19/2014    Performed by Kolton Montgomery M.D. at [facility]   • FLAP GRAFT  2/19/2014    Performed by Kolton Montgomery M.D. at [facility]   • PERINEAL PROCEDURE  12/18/2013    Performed by Kolton Montgomery M.D. at [facility]   • MYOCUTANEOUS FLAP  12/18/2013    Performed by Kolton Montgomery M.D. at SURGERY [facility]   • IRRIGATION & DEBRIDEMENT GENERAL  10/23/2013    Performed by Kolton Montgomery M.D. at SURGERY [facility]   • FISTULA IN ANO REPAIR  9/4/2013    Performed by Kolton Montgomery M.D. at [facility]   • FLAP ROTATION  9/4/2013    Performed by Kolton Montgomery M.D. at [facility]   • RECOVERY  8/26/2013    Performed by [facility]Rec[facility] Surgery at [facility] SAME DAY [facility]   • BIOPSY GENERAL  7/17/2013    Performed by Kolton Montgomery M.D. at SURGERY [facility]   • PROCTOSCOPY   7/17/2013    Performed by Kolton Montgomery M.D. at SURGERY [facility]   • FISTULA IN ANO REPAIR  2/27/2013    Performed by Kolton Montgomery M.D. at SURGERY [facility]   • SIGMOIDOSCOPY  2/27/2013    Performed by Kolton Montgomery M.D. at SURGERY [facility]   • LAPAROSCOPY  10/8/2012    Performed by Kolton Montgomery M.D. at SURGERY [facility]   • ILEO LOOP DIVERSION  10/8/2012    Performed by Kolton Montgomery M.D. at SURGERY [facility]   • COLECTOMY  9/26/2012    Performed by Kolton Montgomery M.D. at SURGERY [facility]   • COLONOSCOPY FLEX W/ENDO US  9/20/2012    Performed by Harshil Bolton M.D. at SURGERY [facility]   • OTHER  2009    left eye torn retina repair   • CATARACT EXTRACTION WITH IOL  2004    bilateral   • ABDOMINAL HYSTERECTOMY TOTAL  1983   • CYST EXCISION  1972    ovarian   • COLON RESECTION     • EYE SURGERY      cataracts   • HYSTERECTOMY LAPAROSCOPY     • PB REMV 2ND CATARACT,CORN-SCLER SECTN     • TONSILLECTOMY         Family History   Problem Relation Age of Onset   • Heart Disease Mother    • Heart Failure Mother    • Hypertension Mother    • Hyperlipidemia Mother    • Cancer Mother    • Cancer Maternal Aunt 60        breast ca   • Lung Disease Brother         COPD/Emphysema/Smoker   • Cancer Brother         prostate cancer   • Alcohol/Drug Brother         Alcohol   • Kidney Disease Father         renal failure       Social History     Socioeconomic History   • Marital status:      Spouse name: Not on file   • Number of children: Not on file   • Years of education: Not on file   • Highest education level: Not on file   Occupational History   • Not on file   Tobacco Use   • Smoking status: Never Smoker   • Smokeless tobacco: Never Used   Vaping Use   • Vaping Use: Never used   Substance and Sexual Activity   • Alcohol use: No   • Drug use: No   • Sexual activity: Never     Partners: Male     Birth control/protection: Post-Menopausal   Other Topics Concern   •  "Primary/coprimary nurse & associates Not Asked   • Family contact information Not Asked   • OK to release patient information to the following Not Asked   • Patient preferred routine/Privacy concerns Not Asked   • Patient likes and dislikes Not Asked   • Participating In Research Study Not Asked   • Miscellaneous Not Asked   Social History Narrative   • Not on file     Social Determinants of Health     Financial Resource Strain:    • Difficulty of Paying Living Expenses:    Food Insecurity:    • Worried About Running Out of Food in the Last Year:    • Ran Out of Food in the Last Year:    Transportation Needs:    • Lack of Transportation (Medical):    • Lack of Transportation (Non-Medical):    Physical Activity:    • Days of Exercise per Week:    • Minutes of Exercise per Session:    Stress:    • Feeling of Stress :    Social Connections:    • Frequency of Communication with Friends and Family:    • Frequency of Social Gatherings with Friends and Family:    • Attends [employer] Services:    • Active Member of Clubs or Organizations:    • Attends Club or Organization Meetings:    • Marital Status:    Intimate Partner Violence:    • Fear of Current or Ex-Partner:    • Emotionally Abused:    • Physically Abused:    • Sexually Abused:        Allergies as of 09/21/2021 - Reviewed 09/21/2021   Allergen Reaction Noted   • Oxaliplatin Anaphylaxis 10/27/2015   • Amoxicillin Rash 08/03/2021   • Codeine  04/05/2011   • Pcn [penicillins] Itching 04/05/2011   • Sulfa drugs Itching 04/05/2011   • Tape Rash 09/04/2013        @Vital signs for this encounter:  Vitals:    09/21/21 1405   Height: 1.651 m (5' 5\")   Weight: 60.8 kg (134 lb)   Weight % change since last entry.: 0 %   BP: 106/60   Pulse: (!) 116   BMI (Calculated): 22.3   Resp: 16       Current medications as of today   Current Outpatient Medications   Medication Sig Dispense Refill   • azithromycin (ZITHROMAX) 250 MG Tab Take 2 tablets on day 1, then take 1 tablet a day " for 4 days. 6 Tablet 0   • XARELTO 10 MG Tab tablet Take 1 tablet by mouth once daily 30 Tablet 5   • STIOLTO RESPIMAT 2.5-2.5 MCG/ACT Aero Soln INHALE 2 PUFFS BY MOUTH ONCE DAILY 1 Each 5   • ondansetron (ZOFRAN) 4 MG Tab tablet Take 1 tablet by mouth every four hours as needed for Nausea/Vomiting (for nausea, vomiting). 30 tablet 6   • lidocaine-prilocaine (EMLA) 2.5-2.5 % Cream Apply to port one hour prior to access and cover with plastic wrap. 1 Each 3   • albuterol (PROVENTIL) 2.5mg/3ml Nebu Soln solution for nebulization Take 3 mL by nebulization every four hours as needed for Shortness of Breath. 120 mL 11   • albuterol 108 (90 Base) MCG/ACT Aero Soln inhalation aerosol Inhale 2 Puffs every 6 hours as needed for Shortness of Breath. 1 Each 11   • potassium chloride ER (KLOR-CON) 10 MEQ tablet Take 1 tablet by mouth every day. 100 tablet 3   • cyanocobalamin (VITAMIN B-12) 500 MCG Tab Take 1,000 mcg by mouth every day.     • Multiple Vitamins-Minerals (CENTRUM SILVER PO) Take 1 Tab by mouth every day.     • Cholecalciferol (VITAMIN D3) 400 UNITS CAPS Take 1 Cap by mouth every day.     • loratadine (CLARITIN) 10 MG TABS Take 10 mg by mouth every day. Indications: Hayfever     • predniSONE (DELTASONE) 10 MG Tab Take 30 mg x3 days - then 20 mg x3 days - then 10 mg x3 days and stop (Patient not taking: Reported on 9/15/2021) 18 Tablet 0     No current facility-administered medications for this visit.         Physical Exam:   Gen:           Alert and oriented, No apparent distress. Mood and affect appears fatigued, normal interaction with provider.  Eyes:          sclere white, conjunctive moist.  Hearing:     Grossly intact.  Dentition:    Upper and lower dentures  Oropharynx:   Tongue normal, posterior pharynx without erythema or exudate.  Neck:        Supple, trachea midline, no masses.  Respiratory Effort: No intercostal retractions or use of accessory muscles.   Lung Auscultation:      Diminished greater right  greater than left lower lobes; no rales, rhonchi or wheezing.  CV:            Regular rate and rhythm. No edema. No murmurs, rubs or gallops.  Digits, Nails, Ext: No clubbing, cyanosis, petechiae, or nodes.   Skin:        No rashes, lesions or ulcers noted on exposed skin surfaces.                     Assessment:  1. Cough     2. Abnormal CT of the chest     3. Hypoxia         Immunizations:    Flu: 10/27/2020  Pneumovax 23: 9/5/2006  Prevnar 13: 2/18/2018    Plan:    81 y.o. year old female here today for follow-up on increased cough and congestion. Last seen in clinic 9/15/2021.  Patient and her  live in [place].  She has had increased cough times about 4 months or so.  History of rectal cancer with metastasis to liver.  Current chemo regimen has been suspended due to respiratory issues.      Hypoxia: She is currently on O2 at 2 L continuous to 3 L pulsed.  She has reported decreased oxygenation at times.  Stable O2 needs in clinic.    Pertinent past medical history includes aspiration pneumonia, previously seen 6/21/2021 by Dr. Jannet Ventura.  She is a never smoker.  She did undergo resection for her rectal cancer in 2012, recurrence right sided liver cancer 2015 for which she has required multiple ablations to the right dome of the liver.  History of PE chronically anticoagulated, falls, dehydration, acute renal failure in July 2021, hyponatremia.  Per Dr. Ventura's note suspicion for retrograde or anterior grade/retrograde aspiration.  Patient denies heartburn but reports large pills get stuck, she is not on a PPI.    Reviewed in clinic vitals including /68, , afebrile, O2 sat 96 on 3 L pulsed.  Patient reports heart rate up a little bit, blood pressure okay but slightly lower than usual.    Reviewed home medication regimen including Stiolto, albuterol nebulizer and inhaler and loratadine.  She is using albuterol twice per day with stated benefit.  Okay to try 3 times a day, monitor heart  rate.  She does have Zofran.    Cough: Moderate amount of yellow production.  Patient did complete course of Levaquin and prednisone with subjective improvement began 8/26/2021 with return of symptoms.  She was treated with Augmentin 7/23/2021. She has Tussionex with limited benefit.  Patient did turn in sputum specimen immediately prior to visit.    Abnormal chest CT: History of aspiration pneumonia.  Case reviewed with pulmonologist, patient has short-term follow-up visit with MD. Update chest CT.    Due to previous benefit seen with antibiotics will initiate course of azithromycin.  Patient to have low suspicion for seeking medical care including fever, decreased blood pressure, increased oxygen needs/decreased oxygen saturations, labored breathing.    Patient has short-term follow-up in 1 week with Dr. Jannet Ventura.    This dictation was created using voice recognition software. The accuracy of the dictation is limited to the abilities of the software. I expect there may be some errors of grammar and possibly content.

## 2021-09-21 NOTE — PROGRESS NOTES
Presc sent to [facility] for increased cough and secretions, patient has new  appointment tomorrow to review.

## 2021-09-21 NOTE — PATIENT INSTRUCTIONS
1-trial mucinex up to 600 mg am/pm to aid secretion clearance  2-stagger Azithromycin doses, dinner today, noon tomorrow and breakfast for remainder, take with food to minimize stomach upset  3-HR has been up, blood pressure ok but lower than usual (115-120)  4-increase albuterol to TID  5-sputum culture pending  6-follow up in 1 week with Dr. Ventura

## 2021-09-23 ENCOUNTER — HOSPITAL ENCOUNTER (OUTPATIENT)
Dept: RADIOLOGY | Facility: MEDICAL CENTER | Age: 81
End: 2021-09-23
Attending: PHYSICIAN ASSISTANT
Payer: MEDICARE

## 2021-09-23 DIAGNOSIS — R05.9 COUGH: ICD-10-CM

## 2021-09-23 DIAGNOSIS — R93.89 ABNORMAL CT OF THE CHEST: ICD-10-CM

## 2021-09-23 PROCEDURE — 71250 CT THORAX DX C-: CPT | Mod: MG

## 2021-09-27 ENCOUNTER — PATIENT MESSAGE (OUTPATIENT)
Dept: SLEEP MEDICINE | Facility: MEDICAL CENTER | Age: 81
End: 2021-09-27

## 2021-09-27 DIAGNOSIS — J47.1 BRONCHIECTASIS WITH ACUTE EXACERBATION (HCC): ICD-10-CM

## 2021-09-27 NOTE — TELEPHONE ENCOUNTER
From: Karlene Walters  To: Physician Assistant Macie Olivera  Sent: 9/27/2021 8:11 AM PDT  Subject: CT scan from last week    Good morning,  I was following up on the CT scan that was done last Thursday, I haven't heard anything. Also, I have been up all night coughing. Us there something that can be prescribed to make me more comfortable. If someone could call me at [phone] and let me know.

## 2021-09-28 ENCOUNTER — APPOINTMENT (OUTPATIENT)
Dept: ONCOLOGY | Facility: MEDICAL CENTER | Age: 81
End: 2021-09-28
Attending: STUDENT IN AN ORGANIZED HEALTH CARE EDUCATION/TRAINING PROGRAM
Payer: MEDICARE

## 2021-09-28 ENCOUNTER — APPOINTMENT (OUTPATIENT)
Dept: HEMATOLOGY ONCOLOGY | Facility: MEDICAL CENTER | Age: 81
End: 2021-09-28
Payer: MEDICARE

## 2021-09-28 ENCOUNTER — APPOINTMENT (OUTPATIENT)
Dept: ONCOLOGY | Facility: MEDICAL CENTER | Age: 81
End: 2021-09-28
Attending: INTERNAL MEDICINE
Payer: MEDICARE

## 2021-09-30 ENCOUNTER — OFFICE VISIT (OUTPATIENT)
Dept: SLEEP MEDICINE | Facility: MEDICAL CENTER | Age: 81
End: 2021-09-30
Payer: MEDICARE

## 2021-09-30 VITALS
BODY MASS INDEX: 22.3 KG/M2 | OXYGEN SATURATION: 99 % | TEMPERATURE: 97 F | DIASTOLIC BLOOD PRESSURE: 60 MMHG | SYSTOLIC BLOOD PRESSURE: 112 MMHG | HEIGHT: 65 IN | RESPIRATION RATE: 18 BRPM | HEART RATE: 107 BPM

## 2021-09-30 DIAGNOSIS — J47.1 BRONCHIECTASIS WITH ACUTE EXACERBATION (HCC): ICD-10-CM

## 2021-09-30 DIAGNOSIS — R05.9 COUGH: ICD-10-CM

## 2021-09-30 DIAGNOSIS — J96.11 CHRONIC RESPIRATORY FAILURE WITH HYPOXIA (HCC): ICD-10-CM

## 2021-09-30 DIAGNOSIS — Z86.711 HISTORY OF PULMONARY EMBOLUS (PE): ICD-10-CM

## 2021-09-30 DIAGNOSIS — J69.0 ASPIRATION PNEUMONIA OF RIGHT LUNG, UNSPECIFIED ASPIRATION PNEUMONIA TYPE, UNSPECIFIED PART OF LUNG (HCC): ICD-10-CM

## 2021-09-30 DIAGNOSIS — H92.01 RIGHT EAR PAIN: ICD-10-CM

## 2021-09-30 PROCEDURE — 99214 OFFICE O/P EST MOD 30 MIN: CPT | Performed by: INTERNAL MEDICINE

## 2021-09-30 RX ORDER — SODIUM CHLORIDE FOR INHALATION 7 %
4 VIAL, NEBULIZER (ML) INHALATION DAILY
Qty: 120 ML | Refills: 3 | Status: SHIPPED | OUTPATIENT
Start: 2021-09-30

## 2021-09-30 RX ORDER — SODIUM CHLORIDE FOR INHALATION 7 %
4 VIAL, NEBULIZER (ML) INHALATION DAILY
Qty: 120 ML | Refills: 0 | Status: SHIPPED
Start: 2021-09-30 | End: 2021-10-11

## 2021-09-30 ASSESSMENT — ENCOUNTER SYMPTOMS
PHOTOPHOBIA: 0
BLURRED VISION: 0
COUGH: 1
PND: 0
CONSTIPATION: 0
BACK PAIN: 0
MYALGIAS: 0
HEADACHES: 0
EYE PAIN: 0
SPEECH CHANGE: 0
CHILLS: 0
FALLS: 0
TREMORS: 0
STRIDOR: 0
HEMOPTYSIS: 0
NAUSEA: 0
EYE DISCHARGE: 0
DIAPHORESIS: 0
SPUTUM PRODUCTION: 1
EYE REDNESS: 0
NECK PAIN: 0
VOMITING: 0
WHEEZING: 0
DOUBLE VISION: 0
DIZZINESS: 0
WEIGHT LOSS: 0
SINUS PAIN: 0
FOCAL WEAKNESS: 0
SORE THROAT: 0
WEAKNESS: 0
ABDOMINAL PAIN: 0
FEVER: 0
DIARRHEA: 0
CLAUDICATION: 0
SHORTNESS OF BREATH: 0
PALPITATIONS: 0
HEARTBURN: 0
DEPRESSION: 0
ORTHOPNEA: 0

## 2021-09-30 NOTE — PATIENT INSTRUCTIONS
Mucinex; immediate release 600 mg of guaifenesin only ingredient can take twice daily    Over the counter decongestant for 3-5 days

## 2021-09-30 NOTE — PROGRESS NOTES
Chief Complaint   Patient presents with   • Follow-Up     last seen 9/21/21 KENDY Olivera PA-C    • Results     CT Chest thorax 9/23/21          HPI: This patient is a 81 y.o. female whom is followed in our clinic for bronchiectasis presumed 2/2 retrograde aspiration and hypoxic respiratory failure last seen by me on 6/21/21 and by Macie RODRÍGUEZ on 9/21/21.  PMHx is significant for rectal Ca dx in 2012 s/p resection with recurrence in the liver in 2015 for which she has had multiple ablations to the R dome of the liver and was on chronic 5-FU stopped recently due to recurrent pneumonia. She does have a hx of PE and on xarelto chronically. She has been tx for COPD due to ? Cough with stiolto and Liana. She denies to me that the stiolto has helped and further has no e/o airflow obstruction on PFTs, is a life-long non-smoker. She has been using narcotics for chronic epigastric pain but was started on low dose PPI by Asha Malone and her pain resolved. She has fairly severe cough that is often productive, occurs both day and night. It is occasionally worse during or after eating.She was seen by speech during a hospital stay for pneumonia in July and cleared for regular diet. No barium swallow was done and we have not ruled out retrograde aspiration. Sputum samples from 7/20 and 9/21 NGTD. Pt is quite distressed today due to ongoing cough, fatigue and just general decline in her health. She continues to have yellow to green mucus production but is improved since recent antibiotics given to her on 9/21. No fevers. No chest pain. She does have a hard time clearing mucus and a flutter valve was ordered but she has not received. She does do nebulized bronchodilators which helps somewhat.    Past Medical History:   Diagnosis Date   • Adverse effect of anesthesia     elevated BP postop   • Anesthesia    • Arrhythmia     occasional   • Blood clotting disorder (HCC) 08/2015    bilat PE    • Blood transfusion 1957   • Breath  shortness     pt reports d/t chemo treatment; no O2; with moderate exertion; no c/o at this time   • Cancer (HCC) 09/2012    rectal cancer,  Liver CA-2015   • CATARACT     removed b/l   • Chemotherapy-induced neutropenia (HCC) 9/28/2016   • Chickenpox     history of   • Colon cancer (HCC)    • Dental disorder     dentures UPPERS AND LOWERS   • Elevated alkaline phosphatase level 11/15/2017   • Emphysema of lung (HCC)     COPD   • Fall    • [place] measles     history of   • Heart murmur     as a child   • Hemorrhagic disorder (HCC)     on Xarelto    • High cholesterol    • Hypercholesteremia    • Hypertension     no meds currently    • Ileostomy in place (HCC)    • Ileostomy in place (HCC)    • Indigestion    • Influenza     history of   • Lightheadedness 9/17/2015   • Mumps     history of   • Obstruction     ileostomy   • Other specified symptom associated with female genital organs     HYSTERECTOMY 1993   • Pneumonia 05/2017    tx'd with antibx   • Pulmonary embolism (HCC)    • 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)    • Renal insufficiency 3/14/2016   • Restless legs 5/6/2020   • Routine general medical examination at a health care facility 3/14/2016    3/14/16   • Seasonal allergies    • Swelling of both ankles     Lasix PRN   • Tonsillitis        Social History     Socioeconomic History   • Marital status:      Spouse name: Not on file   • Number of children: Not on file   • Years of education: Not on file   • Highest education level: Not on file   Occupational History   • Not on file   Tobacco Use   • Smoking status: Never Smoker   • Smokeless tobacco: Never Used   Vaping Use   • Vaping Use: Never used   Substance and Sexual Activity   • Alcohol use: No   • Drug use: No   • Sexual activity: Never     Partners: Male     Birth control/protection: Post-Menopausal   Other Topics Concern   • Primary/coprimary nurse & associates Not Asked   • Family contact information Not Asked   • OK to release  patient information to the following Not Asked   • Patient preferred routine/Privacy concerns Not Asked   • Patient likes and dislikes Not Asked   • Participating In Research Study Not Asked   • Miscellaneous Not Asked   Social History Narrative   • Not on file     Social Determinants of Health     Financial Resource Strain:    • Difficulty of Paying Living Expenses:    Food Insecurity:    • Worried About Running Out of Food in the Last Year:    • Ran Out of Food in the Last Year:    Transportation Needs:    • Lack of Transportation (Medical):    • Lack of Transportation (Non-Medical):    Physical Activity:    • Days of Exercise per Week:    • Minutes of Exercise per Session:    Stress:    • Feeling of Stress :    Social Connections:    • Frequency of Communication with Friends and Family:    • Frequency of Social Gatherings with Friends and Family:    • Attends [employer] Services:    • Active Member of Clubs or Organizations:    • Attends Club or Organization Meetings:    • Marital Status:    Intimate Partner Violence:    • Fear of Current or Ex-Partner:    • Emotionally Abused:    • Physically Abused:    • Sexually Abused:        Family History   Problem Relation Age of Onset   • Heart Disease Mother    • Heart Failure Mother    • Hypertension Mother    • Hyperlipidemia Mother    • Cancer Mother    • Cancer Maternal Aunt 60        breast ca   • Lung Disease Brother         COPD/Emphysema/Smoker   • Cancer Brother         prostate cancer   • Alcohol/Drug Brother         Alcohol   • Kidney Disease Father         renal failure       Current Outpatient Medications on File Prior to Visit   Medication Sig Dispense Refill   • XARELTO 10 MG Tab tablet Take 1 tablet by mouth once daily 30 Tablet 5   • STIOLTO RESPIMAT 2.5-2.5 MCG/ACT Aero Soln INHALE 2 PUFFS BY MOUTH ONCE DAILY 1 Each 5   • ondansetron (ZOFRAN) 4 MG Tab tablet Take 1 tablet by mouth every four hours as needed for Nausea/Vomiting (for nausea, vomiting). 30  tablet 6   • lidocaine-prilocaine (EMLA) 2.5-2.5 % Cream Apply to port one hour prior to access and cover with plastic wrap. 1 Each 3   • albuterol (PROVENTIL) 2.5mg/3ml Nebu Soln solution for nebulization Take 3 mL by nebulization every four hours as needed for Shortness of Breath. 120 mL 11   • albuterol 108 (90 Base) MCG/ACT Aero Soln inhalation aerosol Inhale 2 Puffs every 6 hours as needed for Shortness of Breath. 1 Each 11   • potassium chloride ER (KLOR-CON) 10 MEQ tablet Take 1 tablet by mouth every day. 100 tablet 3   • cyanocobalamin (VITAMIN B-12) 500 MCG Tab Take 1,000 mcg by mouth every day.     • Multiple Vitamins-Minerals (CENTRUM SILVER PO) Take 1 Tab by mouth every day.     • Cholecalciferol (VITAMIN D3) 400 UNITS CAPS Take 1 Cap by mouth every day.     • loratadine (CLARITIN) 10 MG TABS Take 10 mg by mouth every day. Indications: Hayfever       No current facility-administered medications on file prior to visit.       Oxaliplatin, Amoxicillin, Codeine, Pcn [penicillins], Sulfa drugs, and Tape      ROS:   Review of Systems   Constitutional: Positive for malaise/fatigue. Negative for chills, diaphoresis, fever and weight loss.   HENT: Negative for congestion, ear discharge, ear pain, hearing loss, nosebleeds, sinus pain, sore throat and tinnitus.    Eyes: Negative for blurred vision, double vision, photophobia, pain, discharge and redness.   Respiratory: Positive for cough and sputum production. Negative for hemoptysis, shortness of breath, wheezing and stridor.    Cardiovascular: Negative for chest pain, palpitations, orthopnea, claudication, leg swelling and PND.   Gastrointestinal: Negative for abdominal pain, constipation, diarrhea, heartburn, nausea and vomiting.   Genitourinary: Negative for dysuria and urgency.   Musculoskeletal: Negative for back pain, falls, joint pain, myalgias and neck pain.   Skin: Negative for itching and rash.   Neurological: Negative for dizziness, tremors, speech  "change, focal weakness, weakness and headaches.   Endo/Heme/Allergies: Negative for environmental allergies.   Psychiatric/Behavioral: Negative for depression.       /60 (BP Location: Right arm, Patient Position: Sitting, BP Cuff Size: Adult)   Pulse (!) 107   Temp 36.1 °C (97 °F) (Temporal)   Resp 18   Ht 1.651 m (5' 5\")   SpO2 99%   Physical Exam  Vitals reviewed.   Constitutional:       General: She is not in acute distress.     Appearance: Normal appearance. She is well-developed and normal weight.   HENT:      Head: Normocephalic and atraumatic.      Right Ear: External ear normal.      Left Ear: Tympanic membrane and external ear normal.      Ears:      Comments: Mild effusion of right tympanic membrane without evidence of inflammation     Nose: Nose normal. No congestion.      Mouth/Throat:      Mouth: Mucous membranes are moist.      Pharynx: Oropharynx is clear. No oropharyngeal exudate.   Eyes:      General: No scleral icterus.     Extraocular Movements: Extraocular movements intact.      Conjunctiva/sclera: Conjunctivae normal.      Pupils: Pupils are equal, round, and reactive to light.   Neck:      Vascular: No JVD.      Trachea: No tracheal deviation.   Cardiovascular:      Rate and Rhythm: Normal rate and regular rhythm.      Heart sounds: Normal heart sounds. No murmur heard.   No friction rub. No gallop.    Pulmonary:      Effort: Pulmonary effort is normal. No accessory muscle usage or respiratory distress.      Breath sounds: Rhonchi present. No wheezing or rales.      Comments: Bilateral inspiratory rhonchi at lung bases  Abdominal:      General: There is no distension.      Palpations: Abdomen is soft.      Tenderness: There is no abdominal tenderness.   Musculoskeletal:         General: No tenderness or deformity. Normal range of motion.      Cervical back: Normal range of motion and neck supple.      Right lower leg: No edema.      Left lower leg: No edema.   Lymphadenopathy:      " Cervical: No cervical adenopathy.   Skin:     General: Skin is warm and dry.      Findings: No rash.      Nails: There is no clubbing.   Neurological:      Mental Status: She is alert and oriented to person, place, and time.      Cranial Nerves: No cranial nerve deficit.      Gait: Gait normal.   Psychiatric:         Mood and Affect: Mood normal.         Behavior: Behavior normal.         PFTs as reviewed by me personally: As per HPI    Imaging as reviewed by me personally: CT chest from 9/23 reviewed by me showed bilateral but mainly right lower lobe bronchiectasis with some associated consolidation, airway thickening and mucous plugging right greater than left    Assessment:  1. Bronchiectasis with acute exacerbation (HCC)  sodium chloride (HYPER-SAL) 7 % Nebu Soln   2. Cough     3. History of pulmonary embolus (PE)     4. Aspiration pneumonia of right lung, unspecified aspiration pneumonia type, unspecified part of lung (HCC)     5. Chronic respiratory failure with hypoxia (HCC)     6. Right ear pain         Plan:  1. This patient has bronchiectasis in bilateral lower lobes but favoring the right lower lobe. I suspect this is due to retrograde aspiration in the setting of reflux and patient does sleep at an incline. I cannot rule out an element of immunodeficiency given her underlying cancer and treatment with chemotherapy. I am not sure that suppressive antibiotics would be more beneficial than harmful at this point but I do think we could step up airway clearance with hypertonic saline once daily in addition to nebulized bronchodilators twice daily and the addition of guaifenesin for mucolytic activities.  2. Chronic and likely secondary to bronchiectasis. See discussion above. We will treat her with airway clearance in hopes that that decreases her cough.  3. On chronic anticoagulation. No evidence for chronic thromboembolic complications  4. Cleared by sleep during her hospital stay but no modified barium  swallow to evaluate for silent aspiration and certainly we have not ruled out retrograde aspiration in the setting of reflux. Best supportive care for prevention at this point.  5. Presumed secondary to bronchiectasis with mucous plugging and recurrent infection. I am not sure we would gain much from bronchoscopy at this point but could consider in the future if she was unable to produce sputum. Continue supplemental oxygen which she is compliant with and benefiting from.  6. Patient complained of right-sided ear pain and she does have mild effusion on that side without evidence of inflammation. I recommended 3 days of decongestant therapy and follow-up with PCP or ENT referral if symptoms continue.  Return in about 6 months (around 3/30/2022) for Dr. Ventura only; please put on recall list for 3 month f/u.

## 2021-10-01 ENCOUNTER — TELEPHONE (OUTPATIENT)
Dept: HEMATOLOGY ONCOLOGY | Facility: MEDICAL CENTER | Age: 81
End: 2021-10-01

## 2021-10-01 ENCOUNTER — NON-PROVIDER VISIT (OUTPATIENT)
Dept: HEMATOLOGY ONCOLOGY | Facility: MEDICAL CENTER | Age: 81
End: 2021-10-01
Payer: MEDICARE

## 2021-10-01 VITALS
RESPIRATION RATE: 20 BRPM | DIASTOLIC BLOOD PRESSURE: 67 MMHG | TEMPERATURE: 96.8 F | HEART RATE: 117 BPM | SYSTOLIC BLOOD PRESSURE: 105 MMHG | OXYGEN SATURATION: 96 %

## 2021-10-01 DIAGNOSIS — Z95.828 PORT-A-CATH IN PLACE: ICD-10-CM

## 2021-10-01 DIAGNOSIS — E86.0 DEHYDRATION: ICD-10-CM

## 2021-10-01 PROCEDURE — 96360 HYDRATION IV INFUSION INIT: CPT | Performed by: NURSE PRACTITIONER

## 2021-10-01 PROCEDURE — 96361 HYDRATE IV INFUSION ADD-ON: CPT | Performed by: NURSE PRACTITIONER

## 2021-10-01 RX ORDER — SODIUM CHLORIDE 9 MG/ML
1000 INJECTION, SOLUTION INTRAVENOUS ONCE
Status: CANCELLED | OUTPATIENT
Start: 2021-10-02 | End: 2021-10-02

## 2021-10-01 RX ORDER — 0.9 % SODIUM CHLORIDE 0.9 %
VIAL (ML) INJECTION PRN
Status: CANCELLED | OUTPATIENT
Start: 2021-10-01

## 2021-10-01 RX ORDER — HEPARIN SODIUM (PORCINE) LOCK FLUSH IV SOLN 100 UNIT/ML 100 UNIT/ML
500 SOLUTION INTRAVENOUS PRN
Status: CANCELLED | OUTPATIENT
Start: 2021-10-03

## 2021-10-01 RX ORDER — 0.9 % SODIUM CHLORIDE 0.9 %
3 VIAL (ML) INJECTION PRN
Status: CANCELLED | OUTPATIENT
Start: 2021-10-27

## 2021-10-01 RX ORDER — 0.9 % SODIUM CHLORIDE 0.9 %
VIAL (ML) INJECTION PRN
Status: CANCELLED | OUTPATIENT
Start: 2021-10-27

## 2021-10-01 RX ORDER — SODIUM CHLORIDE 9 MG/ML
1000 INJECTION, SOLUTION INTRAVENOUS ONCE
Status: COMPLETED | OUTPATIENT
Start: 2021-10-01 | End: 2021-10-01

## 2021-10-01 RX ORDER — 0.9 % SODIUM CHLORIDE 0.9 %
3 VIAL (ML) INJECTION PRN
Status: CANCELLED | OUTPATIENT
Start: 2021-10-03

## 2021-10-01 RX ORDER — 0.9 % SODIUM CHLORIDE 0.9 %
3 VIAL (ML) INJECTION PRN
Status: CANCELLED | OUTPATIENT
Start: 2021-10-02

## 2021-10-01 RX ORDER — 0.9 % SODIUM CHLORIDE 0.9 %
10 VIAL (ML) INJECTION PRN
Status: DISCONTINUED | OUTPATIENT
Start: 2021-10-01 | End: 2021-10-01 | Stop reason: HOSPADM

## 2021-10-01 RX ORDER — 0.9 % SODIUM CHLORIDE 0.9 %
VIAL (ML) INJECTION PRN
Status: CANCELLED | OUTPATIENT
Start: 2021-10-03

## 2021-10-01 RX ORDER — 0.9 % SODIUM CHLORIDE 0.9 %
10 VIAL (ML) INJECTION PRN
Status: CANCELLED | OUTPATIENT
Start: 2021-10-27

## 2021-10-01 RX ORDER — 0.9 % SODIUM CHLORIDE 0.9 %
10 VIAL (ML) INJECTION PRN
Status: CANCELLED | OUTPATIENT
Start: 2021-10-03

## 2021-10-01 RX ORDER — SODIUM CHLORIDE 9 MG/ML
1000 INJECTION, SOLUTION INTRAVENOUS ONCE
Status: CANCELLED | OUTPATIENT
Start: 2021-10-03 | End: 2021-10-03

## 2021-10-01 RX ORDER — HEPARIN SODIUM (PORCINE) LOCK FLUSH IV SOLN 100 UNIT/ML 100 UNIT/ML
500 SOLUTION INTRAVENOUS PRN
Status: CANCELLED | OUTPATIENT
Start: 2021-10-27

## 2021-10-01 RX ORDER — 0.9 % SODIUM CHLORIDE 0.9 %
10 VIAL (ML) INJECTION PRN
Status: CANCELLED | OUTPATIENT
Start: 2021-10-02

## 2021-10-01 RX ORDER — HEPARIN SODIUM (PORCINE) LOCK FLUSH IV SOLN 100 UNIT/ML 100 UNIT/ML
500 SOLUTION INTRAVENOUS PRN
Status: CANCELLED | OUTPATIENT
Start: 2021-10-02

## 2021-10-01 RX ORDER — 0.9 % SODIUM CHLORIDE 0.9 %
VIAL (ML) INJECTION PRN
Status: CANCELLED | OUTPATIENT
Start: 2021-10-02

## 2021-10-01 RX ORDER — HEPARIN SODIUM (PORCINE) LOCK FLUSH IV SOLN 100 UNIT/ML 100 UNIT/ML
500 SOLUTION INTRAVENOUS PRN
Status: DISCONTINUED | OUTPATIENT
Start: 2021-10-01 | End: 2021-10-01 | Stop reason: HOSPADM

## 2021-10-01 RX ORDER — 0.9 % SODIUM CHLORIDE 0.9 %
3 VIAL (ML) INJECTION PRN
Status: DISCONTINUED | OUTPATIENT
Start: 2021-10-01 | End: 2021-10-01 | Stop reason: HOSPADM

## 2021-10-01 RX ORDER — SODIUM CHLORIDE 9 MG/ML
1000 INJECTION, SOLUTION INTRAVENOUS ONCE
Status: CANCELLED | OUTPATIENT
Start: 2021-10-27 | End: 2021-10-04

## 2021-10-01 RX ADMIN — SODIUM CHLORIDE 1000 ML: 9 INJECTION, SOLUTION INTRAVENOUS at 11:50

## 2021-10-01 RX ADMIN — HEPARIN SODIUM (PORCINE) LOCK FLUSH IV SOLN 100 UNIT/ML 500 UNITS: 100 SOLUTION at 14:25

## 2021-10-01 ASSESSMENT — PAIN SCALES - GENERAL: PAINLEVEL: NO PAIN

## 2021-10-01 NOTE — NON-PROVIDER
"Pt arrived via hospital w/c accompanied by spouse for IV hydration.    1145 - Port was accessed using sterile technique with 1\" 20 ga hernandez needle, per protocol. Port was then flushed with 10 cc syringes of normal saline and brisk blood return noted.      1150 - Port was attached to IV pump tubing for bolus infusion.      1420 - Bolus infusion completed.    1425 - Port was then flushed using 2 10 cc syringes of normal saline followed by 500 units of heparin delivered via 10 cc syringe with push-pause technique, deaccessed, and covered with Tegaderm. Pt was instructed to keep Tegaderm in place for 24 fours to reduce risk of infection. Pt tolerated procedure well and accompanied by spouse from clinic without s/s of distress in hospital w/c.  Pt agreeable with infusion appt scheduled in [facility] for tomorrow 10/2 at 0730 for another add'tl IV bolus.  "

## 2021-10-01 NOTE — TELEPHONE ENCOUNTER
"Rec'vd call from pt with c/o feeling dehydrated & weak.  Pt reports these are common symptoms she has experienced in the past prior to being hospitalized.  Pt states she was told by our providers to communicate with us when these symptoms start so that pt can receive IV hydration for a few days to avoid hospitalization.  Pt states she has not been able to drink enough fluids (water, Gatorade) as expected this last week & feels \"dehydrated.\"  Per Maria M MORALES ok to schedule pt in office for IV hydration today (infusion ctr unavailable today).  Instructed pt to come in at 1130 today, pt agreeable.  "

## 2021-10-02 ENCOUNTER — OUTPATIENT INFUSION SERVICES (OUTPATIENT)
Dept: ONCOLOGY | Facility: MEDICAL CENTER | Age: 81
End: 2021-10-02
Attending: INTERNAL MEDICINE
Payer: MEDICARE

## 2021-10-02 VITALS
TEMPERATURE: 97.9 F | HEIGHT: 64 IN | WEIGHT: 130.29 LBS | SYSTOLIC BLOOD PRESSURE: 116 MMHG | DIASTOLIC BLOOD PRESSURE: 62 MMHG | RESPIRATION RATE: 19 BRPM | HEART RATE: 107 BPM | BODY MASS INDEX: 22.24 KG/M2 | OXYGEN SATURATION: 100 %

## 2021-10-02 DIAGNOSIS — E86.0 DEHYDRATION: ICD-10-CM

## 2021-10-02 PROCEDURE — 96361 HYDRATE IV INFUSION ADD-ON: CPT

## 2021-10-02 PROCEDURE — 700105 HCHG RX REV CODE 258: Performed by: NURSE PRACTITIONER

## 2021-10-02 PROCEDURE — A4212 NON CORING NEEDLE OR STYLET: HCPCS

## 2021-10-02 PROCEDURE — 700111 HCHG RX REV CODE 636 W/ 250 OVERRIDE (IP): Performed by: NURSE PRACTITIONER

## 2021-10-02 PROCEDURE — 96360 HYDRATION IV INFUSION INIT: CPT

## 2021-10-02 RX ORDER — HEPARIN SODIUM (PORCINE) LOCK FLUSH IV SOLN 100 UNIT/ML 100 UNIT/ML
500 SOLUTION INTRAVENOUS PRN
Status: DISCONTINUED | OUTPATIENT
Start: 2021-10-02 | End: 2021-10-02 | Stop reason: HOSPADM

## 2021-10-02 RX ORDER — SODIUM CHLORIDE 9 MG/ML
1000 INJECTION, SOLUTION INTRAVENOUS ONCE
Status: COMPLETED | OUTPATIENT
Start: 2021-10-02 | End: 2021-10-02

## 2021-10-02 RX ADMIN — SODIUM CHLORIDE 1000 ML: 9 INJECTION, SOLUTION INTRAVENOUS at 07:51

## 2021-10-02 RX ADMIN — HEPARIN 500 UNITS: 100 SYRINGE at 10:00

## 2021-10-02 ASSESSMENT — FIBROSIS 4 INDEX: FIB4 SCORE: 1.27

## 2021-10-02 NOTE — PROGRESS NOTES
Pt presented for hydration. Left chest port in place, declined lidocaine. Accessed in sterile fashion, brisk blood return observed. 1 L NS infused over 2 hours per pt preference. Port flushed, heparinized and de-accessed with needle intact, gauze drsg placed. Has next lab/chemo appt, left on foot to self care.

## 2021-10-04 ENCOUNTER — APPOINTMENT (OUTPATIENT)
Dept: HEMATOLOGY ONCOLOGY | Facility: MEDICAL CENTER | Age: 81
End: 2021-10-04
Payer: MEDICARE

## 2021-10-04 ENCOUNTER — OUTPATIENT INFUSION SERVICES (OUTPATIENT)
Dept: ONCOLOGY | Facility: MEDICAL CENTER | Age: 81
End: 2021-10-04
Attending: INTERNAL MEDICINE
Payer: MEDICARE

## 2021-10-04 VITALS
BODY MASS INDEX: 22.77 KG/M2 | TEMPERATURE: 97.1 F | HEART RATE: 112 BPM | DIASTOLIC BLOOD PRESSURE: 68 MMHG | OXYGEN SATURATION: 100 % | WEIGHT: 133.38 LBS | RESPIRATION RATE: 18 BRPM | HEIGHT: 64 IN | SYSTOLIC BLOOD PRESSURE: 133 MMHG

## 2021-10-04 VITALS
WEIGHT: 133.38 LBS | HEIGHT: 64 IN | DIASTOLIC BLOOD PRESSURE: 68 MMHG | RESPIRATION RATE: 18 BRPM | HEART RATE: 112 BPM | OXYGEN SATURATION: 100 % | BODY MASS INDEX: 22.77 KG/M2 | SYSTOLIC BLOOD PRESSURE: 133 MMHG | TEMPERATURE: 97.1 F

## 2021-10-04 DIAGNOSIS — C20 RECTAL CANCER (HCC): ICD-10-CM

## 2021-10-04 DIAGNOSIS — E86.0 DEHYDRATION: ICD-10-CM

## 2021-10-04 LAB
ALBUMIN SERPL BCP-MCNC: 2.7 G/DL (ref 3.2–4.9)
ALBUMIN/GLOB SERPL: 0.8 G/DL
ALP SERPL-CCNC: 192 U/L (ref 30–99)
ALT SERPL-CCNC: 19 U/L (ref 2–50)
ANION GAP SERPL CALC-SCNC: 12 MMOL/L (ref 7–16)
AST SERPL-CCNC: 20 U/L (ref 12–45)
BASOPHILS # BLD AUTO: 0.5 % (ref 0–1.8)
BASOPHILS # BLD: 0.07 K/UL (ref 0–0.12)
BILIRUB SERPL-MCNC: 0.3 MG/DL (ref 0.1–1.5)
BUN SERPL-MCNC: 20 MG/DL (ref 8–22)
CALCIUM SERPL-MCNC: 9.6 MG/DL (ref 8.5–10.5)
CEA SERPL-MCNC: 3 NG/ML (ref 0–3)
CHLORIDE SERPL-SCNC: 104 MMOL/L (ref 96–112)
CO2 SERPL-SCNC: 18 MMOL/L (ref 20–33)
CREAT SERPL-MCNC: 0.91 MG/DL (ref 0.5–1.4)
EOSINOPHIL # BLD AUTO: 0.36 K/UL (ref 0–0.51)
EOSINOPHIL NFR BLD: 2.5 % (ref 0–6.9)
ERYTHROCYTE [DISTWIDTH] IN BLOOD BY AUTOMATED COUNT: 62.1 FL (ref 35.9–50)
GLOBULIN SER CALC-MCNC: 3.6 G/DL (ref 1.9–3.5)
GLUCOSE SERPL-MCNC: 210 MG/DL (ref 65–99)
HCT VFR BLD AUTO: 34.7 % (ref 37–47)
HGB BLD-MCNC: 10.8 G/DL (ref 12–16)
IMM GRANULOCYTES # BLD AUTO: 0.2 K/UL (ref 0–0.11)
IMM GRANULOCYTES NFR BLD AUTO: 1.4 % (ref 0–0.9)
LYMPHOCYTES # BLD AUTO: 0.87 K/UL (ref 1–4.8)
LYMPHOCYTES NFR BLD: 6.1 % (ref 22–41)
MCH RBC QN AUTO: 27.6 PG (ref 27–33)
MCHC RBC AUTO-ENTMCNC: 31.1 G/DL (ref 33.6–35)
MCV RBC AUTO: 88.5 FL (ref 81.4–97.8)
MONOCYTES # BLD AUTO: 0.55 K/UL (ref 0–0.85)
MONOCYTES NFR BLD AUTO: 3.8 % (ref 0–13.4)
NEUTROPHILS # BLD AUTO: 12.3 K/UL (ref 2–7.15)
NEUTROPHILS NFR BLD: 85.7 % (ref 44–72)
NRBC # BLD AUTO: 0 K/UL
NRBC BLD-RTO: 0 /100 WBC
PLATELET # BLD AUTO: 340 K/UL (ref 164–446)
PMV BLD AUTO: 9.4 FL (ref 9–12.9)
POTASSIUM SERPL-SCNC: 4.2 MMOL/L (ref 3.6–5.5)
PROT SERPL-MCNC: 6.3 G/DL (ref 6–8.2)
RBC # BLD AUTO: 3.92 M/UL (ref 4.2–5.4)
SODIUM SERPL-SCNC: 134 MMOL/L (ref 135–145)
WBC # BLD AUTO: 14.4 K/UL (ref 4.8–10.8)

## 2021-10-04 PROCEDURE — 700105 HCHG RX REV CODE 258: Performed by: NURSE PRACTITIONER

## 2021-10-04 PROCEDURE — 96360 HYDRATION IV INFUSION INIT: CPT

## 2021-10-04 PROCEDURE — 80053 COMPREHEN METABOLIC PANEL: CPT

## 2021-10-04 PROCEDURE — 96361 HYDRATE IV INFUSION ADD-ON: CPT

## 2021-10-04 PROCEDURE — 82378 CARCINOEMBRYONIC ANTIGEN: CPT

## 2021-10-04 PROCEDURE — 36591 DRAW BLOOD OFF VENOUS DEVICE: CPT

## 2021-10-04 PROCEDURE — 700111 HCHG RX REV CODE 636 W/ 250 OVERRIDE (IP): Performed by: NURSE PRACTITIONER

## 2021-10-04 PROCEDURE — A4212 NON CORING NEEDLE OR STYLET: HCPCS

## 2021-10-04 PROCEDURE — 85025 COMPLETE CBC W/AUTO DIFF WBC: CPT

## 2021-10-04 RX ORDER — HEPARIN SODIUM (PORCINE) LOCK FLUSH IV SOLN 100 UNIT/ML 100 UNIT/ML
500 SOLUTION INTRAVENOUS PRN
Status: DISCONTINUED | OUTPATIENT
Start: 2021-10-04 | End: 2021-10-04 | Stop reason: HOSPADM

## 2021-10-04 RX ORDER — SODIUM CHLORIDE 9 MG/ML
1000 INJECTION, SOLUTION INTRAVENOUS ONCE
Status: COMPLETED | OUTPATIENT
Start: 2021-10-04 | End: 2021-10-04

## 2021-10-04 RX ADMIN — HEPARIN 500 UNITS: 100 SYRINGE at 14:14

## 2021-10-04 RX ADMIN — SODIUM CHLORIDE 1000 ML: 9 INJECTION, SOLUTION INTRAVENOUS at 12:12

## 2021-10-04 ASSESSMENT — FIBROSIS 4 INDEX
FIB4 SCORE: 1.27
FIB4 SCORE: 1.09

## 2021-10-04 NOTE — PROGRESS NOTES
Pt presents to [facility] for labs and hydration. L chest port accessed using sterile technique; brisk blood return observed and pt tolerated well. Labs drawn per orders. 1L NS infused with no s/s of adverse reactions. Brisk blood return observed from port before flushed, heparin locked, and de-accessed; gauze and tape dressing applied. Next appointment confirmed and education provided. Pt discharged to self care with all personal belongings and in NAD.

## 2021-10-05 ENCOUNTER — APPOINTMENT (OUTPATIENT)
Dept: ONCOLOGY | Facility: MEDICAL CENTER | Age: 81
End: 2021-10-05
Attending: STUDENT IN AN ORGANIZED HEALTH CARE EDUCATION/TRAINING PROGRAM
Payer: MEDICARE

## 2021-10-06 ENCOUNTER — OFFICE VISIT (OUTPATIENT)
Dept: HEMATOLOGY ONCOLOGY | Facility: MEDICAL CENTER | Age: 81
End: 2021-10-06
Payer: MEDICARE

## 2021-10-06 VITALS
WEIGHT: 134.7 LBS | HEIGHT: 64 IN | HEART RATE: 118 BPM | TEMPERATURE: 98.6 F | BODY MASS INDEX: 23 KG/M2 | SYSTOLIC BLOOD PRESSURE: 118 MMHG | OXYGEN SATURATION: 97 % | DIASTOLIC BLOOD PRESSURE: 58 MMHG

## 2021-10-06 DIAGNOSIS — C20 MALIGNANT NEOPLASM OF RECTUM (HCC): ICD-10-CM

## 2021-10-06 PROCEDURE — 99213 OFFICE O/P EST LOW 20 MIN: CPT | Performed by: STUDENT IN AN ORGANIZED HEALTH CARE EDUCATION/TRAINING PROGRAM

## 2021-10-06 RX ORDER — 0.9 % SODIUM CHLORIDE 0.9 %
10 VIAL (ML) INJECTION PRN
Status: CANCELLED | OUTPATIENT
Start: 2021-11-10

## 2021-10-06 RX ORDER — HEPARIN SODIUM (PORCINE) LOCK FLUSH IV SOLN 100 UNIT/ML 100 UNIT/ML
500 SOLUTION INTRAVENOUS PRN
Status: CANCELLED | OUTPATIENT
Start: 2021-11-10

## 2021-10-06 RX ORDER — 0.9 % SODIUM CHLORIDE 0.9 %
VIAL (ML) INJECTION PRN
Status: CANCELLED | OUTPATIENT
Start: 2021-11-10

## 2021-10-06 RX ORDER — 0.9 % SODIUM CHLORIDE 0.9 %
3 VIAL (ML) INJECTION PRN
Status: CANCELLED | OUTPATIENT
Start: 2021-11-10

## 2021-10-06 RX ORDER — SODIUM CHLORIDE 9 MG/ML
1000 INJECTION, SOLUTION INTRAVENOUS ONCE
Status: CANCELLED | OUTPATIENT
Start: 2021-11-10 | End: 2021-10-26

## 2021-10-06 ASSESSMENT — ENCOUNTER SYMPTOMS
DIZZINESS: 0
CONSTIPATION: 0
WEAKNESS: 1
NAUSEA: 0
BRUISES/BLEEDS EASILY: 0
ORTHOPNEA: 0
BLOOD IN STOOL: 0
HEARTBURN: 0
VOMITING: 0
SORE THROAT: 0
WHEEZING: 0
COUGH: 1
DOUBLE VISION: 0
DIARRHEA: 0
FEVER: 0
SINUS PAIN: 0
BACK PAIN: 1
BLURRED VISION: 0
TINGLING: 0
ABDOMINAL PAIN: 0
HEADACHES: 0
PALPITATIONS: 0
CHILLS: 0
SPUTUM PRODUCTION: 0
MYALGIAS: 1
DIAPHORESIS: 0
INSOMNIA: 0
WEIGHT LOSS: 0
NERVOUS/ANXIOUS: 1
SHORTNESS OF BREATH: 1

## 2021-10-06 ASSESSMENT — FIBROSIS 4 INDEX: FIB4 SCORE: 1.09

## 2021-10-06 NOTE — PROGRESS NOTES
Follow Up Note:  Hematology/Oncology      Primary Care:  Manan uQiñonez M.D.    Diagnosis: Metastatic rectal adenocarcinoma    Chief Complaint: Shortness of breath, management of systemic therapy    Current Treatment: Surveillance    Oncology History of Presenting Illness:  Karlene Walters is a 81 y.o.  woman who presents to the clinic today for evaluation for systemic therapy.  Her oncology history is reviewed as follows (from ANDREW Krishnan):    Patient with a long history of rectal carcinoma initially diagnosed in 2012.  She was found to have metastatic disease to liver and lung in 2015.  She is status post liver ablation as well as has received Y 90 in the past.  She was on XELOX initially but had allergic reaction to oxaliplatin (last dose 7/28/15) and was switched to single agent 5-FU. Patient initially on 5-FU at 2400 mg/m2 with Leucovorin and 5-FU push starting on 10/27/15, followed by 20% dose reduction to 1920 mg/m2 for cycle 18 (7/5/16), followed by deletion of Leucovorin and 5-FU push on 10/31/17. In January 2020 patient was changed to every 3-week cycle at cycle #99 ([place] #91) and then changed to every 4-week interval in May 2020.  It was at that time there was noted to have growth of disease within the liver and was seen by Dr. West, surgeon.  Patient did undergo surgery in hopes of resection of the tumor but it was felt to be too close to the hilum and therefore she did undergo ablation instead on 5/28/20.  She has been back on single agent 5-FU at every 2-week interval, restarted on 6/16/20. Last CT 10/06/20 showed the tumor ablation cavities within the right lobe of the liver have decreased in size since previous examination.  There is a 1.5 x 3.1 cm focal area of enhancement adjacent to the ablation cavity that appears to have increased concerning for possible tumor recurrence.  Based on that finding she was sent for an MRI for further evaluation on 10/8/2020 which  confirmed that the appearance of the masses noted in the liver did not have an appearance for suspicious finding for recurrent metastatic disease.  Patient continued with MRI follow up imaging every 3 months, most recent on 4/5/21 showed stable appearance of the liver with distortion of the right lobe consistent with prior partial hepatectomy and ablation.  No evidence of recurrent tumor.  Enhancing soft tissue nodule in the right cardiophrenic angle again seen and unchanged consistent with adenopathy, measuring 17 mm in size.      March 2021 patient had experienced significant fatigue and shortness of breath.  This had progressively worsened. She was hospitalized in March. She was seen by cardiology and echocardiogram completed which showed an ejection fraction of 65%.  CT chest with contrast completed in March was stable. Continued and progressively worsening SOB, repeat CT chest 5/18/2021. Patient was seen by pulmonary 5/19/2021 and VQ scan negative. MRI brain completed on 5/20/2021 showed no evidence of metastatic disease.     She was seen by Dr. Steele, who felt the patient has received enough 5FU and was transitioned to Irinotecan/Avastin, first dose 7/6/21. She received 1 cycle and then hospitalized and treated for pneumonia. Patient continued with severe respiratory distress and cycle 2 was delayed until resolution of symptoms. She then received cycle 2 on 8/17 and again had significant issues. She has been off of therapy since then and has been monitored.     Treatment History:     2012: Initially diagnosed with rectal carcinoma.  2015: Discovered metastasis to liver and lung, initially on XELOX but switched to 5-FU single agent.  [id number]: Continued 5-FU with occasional interruptions and dose variations, with occasional localized therapy with ablation to the liver.   7/6/21: Switched to irinotecan and bevacizumab due to concern for marrow toxicity.  8/17/21: Received C2 irinotecan/horacio.  10/04/21: CEA  3.0    Interval History:  Patient is here for follow up visit.  She is still having issues with shortness of breath. She was seen by pulmonology who felt that her issues were related to retrograde aspiration, and have prescribed her hypertonic saline to use with the nebulizer and try to help with expectoration. She has not yet received it yet, but hopes to have it today. She feels a little better otherwise, having needed hydration last week due to feeling weak. She feels the hydration helps her tremendously.     Allergies as of 10/06/2021 - Reviewed 10/06/2021   Allergen Reaction Noted   • Oxaliplatin Anaphylaxis 10/27/2015   • Amoxicillin Rash 08/03/2021   • Codeine  04/05/2011   • Pcn [penicillins] Itching 04/05/2011   • Sulfa drugs Itching 04/05/2011   • Tape Rash 09/04/2013         Current Outpatient Medications:   •  sodium chloride (HYPER-SAL) 7 % Nebu Soln, Take 4 mL by nebulization every day., Disp: 120 mL, Rfl: 3  •  sodium chloride (HYPER-SAL) 7 % Nebu Soln, Take 4 mL by nebulization every day., Disp: 120 mL, Rfl: 0  •  XARELTO 10 MG Tab tablet, Take 1 tablet by mouth once daily, Disp: 30 Tablet, Rfl: 5  •  STIOLTO RESPIMAT 2.5-2.5 MCG/ACT Aero Soln, INHALE 2 PUFFS BY MOUTH ONCE DAILY, Disp: 1 Each, Rfl: 5  •  ondansetron (ZOFRAN) 4 MG Tab tablet, Take 1 tablet by mouth every four hours as needed for Nausea/Vomiting (for nausea, vomiting)., Disp: 30 tablet, Rfl: 6  •  lidocaine-prilocaine (EMLA) 2.5-2.5 % Cream, Apply to port one hour prior to access and cover with plastic wrap., Disp: 1 Each, Rfl: 3  •  albuterol (PROVENTIL) 2.5mg/3ml Nebu Soln solution for nebulization, Take 3 mL by nebulization every four hours as needed for Shortness of Breath., Disp: 120 mL, Rfl: 11  •  albuterol 108 (90 Base) MCG/ACT Aero Soln inhalation aerosol, Inhale 2 Puffs every 6 hours as needed for Shortness of Breath., Disp: 1 Each, Rfl: 11  •  potassium chloride ER (KLOR-CON) 10 MEQ tablet, Take 1 tablet by mouth every day.,  "Disp: 100 tablet, Rfl: 3  •  cyanocobalamin (VITAMIN B-12) 500 MCG Tab, Take 1,000 mcg by mouth every day., Disp: , Rfl:   •  Multiple Vitamins-Minerals (CENTRUM SILVER PO), Take 1 Tab by mouth every day., Disp: , Rfl:   •  Cholecalciferol (VITAMIN D3) 400 UNITS CAPS, Take 1 Cap by mouth every day., Disp: , Rfl:   •  loratadine (CLARITIN) 10 MG TABS, Take 10 mg by mouth every day. Indications: Hayfever, Disp: , Rfl:       Review of Systems:  Review of Systems   Constitutional: Positive for malaise/fatigue. Negative for chills, diaphoresis, fever and weight loss.   HENT: Negative for hearing loss, nosebleeds, sinus pain and sore throat.    Eyes: Negative for blurred vision and double vision.   Respiratory: Positive for cough and shortness of breath. Negative for sputum production and wheezing.    Cardiovascular: Negative for chest pain, palpitations, orthopnea and leg swelling.   Gastrointestinal: Negative for abdominal pain, blood in stool, constipation, diarrhea, heartburn, melena, nausea and vomiting.   Genitourinary: Negative for dysuria, frequency, hematuria and urgency.   Musculoskeletal: Positive for back pain, joint pain and myalgias.   Skin: Negative for itching and rash.   Neurological: Positive for weakness. Negative for dizziness, tingling and headaches.   Endo/Heme/Allergies: Does not bruise/bleed easily.   Psychiatric/Behavioral: The patient is nervous/anxious. The patient does not have insomnia.          Physical Exam:  Vitals:    10/06/21 1312   BP: 118/58   Pulse: (!) 118   Temp: 37 °C (98.6 °F)   TempSrc: Temporal   SpO2: 97%   Weight: 61.1 kg (134 lb 11.2 oz)   Height: 1.62 m (5' 3.78\")       DESC; KARNOFSKY SCALE WITH ECOG EQUIVALENT: 50, Requires considerable assistance and frequent medical care (ECOG equivalent 2)    DISTRESS LEVEL: no acute distress    Physical Exam  Vitals and nursing note reviewed.   Constitutional:       General: She is awake. She is not in acute distress (short of breath, " requiring O2).     Appearance: Normal appearance. She is underweight. She is not ill-appearing, toxic-appearing or diaphoretic.   HENT:      Head: Normocephalic and atraumatic.      Nose: Nose normal. No congestion.      Mouth/Throat:      Pharynx: Oropharynx is clear. No oropharyngeal exudate or posterior oropharyngeal erythema.   Eyes:      General: No scleral icterus.     Extraocular Movements: Extraocular movements intact.      Conjunctiva/sclera: Conjunctivae normal.      Pupils: Pupils are equal, round, and reactive to light.   Cardiovascular:      Rate and Rhythm: Normal rate and regular rhythm.      Pulses: Normal pulses.      Heart sounds: Normal heart sounds. No murmur heard.   No friction rub. No gallop.    Pulmonary:      Effort: Pulmonary effort is normal.      Breath sounds: Decreased air movement present. Examination of the right-middle field reveals rhonchi. Examination of the right-lower field reveals rhonchi. Rhonchi present. No wheezing or rales.   Abdominal:      General: Bowel sounds are normal. There is no distension.      Tenderness: There is no abdominal tenderness.   Musculoskeletal:         General: No deformity. Normal range of motion.      Cervical back: Normal range of motion and neck supple. No tenderness.      Right lower leg: No edema.      Left lower leg: No edema.   Lymphadenopathy:      Cervical: No cervical adenopathy.      Upper Body:      Right upper body: No axillary adenopathy.      Left upper body: No axillary adenopathy.      Lower Body: No right inguinal adenopathy. No left inguinal adenopathy.   Skin:     General: Skin is warm and dry.      Coloration: Skin is not jaundiced.      Findings: No erythema or rash.   Neurological:      General: No focal deficit present.      Mental Status: She is alert and oriented to person, place, and time.      Cranial Nerves: Cranial nerves are intact.      Sensory: Sensation is intact.      Motor: Weakness present.      Gait: Gait abnormal  (Requires wheelchair).   Psychiatric:         Attention and Perception: Attention normal.         Mood and Affect: Mood normal.         Behavior: Behavior normal. Behavior is cooperative.         Thought Content: Thought content normal.         Judgment: Judgment normal.         Labs:  [facility] on 10/04/2021   Component Date Value Ref Range Status   • Carcinoembryonic Antigen 10/04/2021 3.0  0.0 - 3.0 ng/mL Final    Comment: Performed using Roche ashleigh e immunoassay analyzer. CEA values determined on  patient samples by different testing procedures cannot be directly compared  with one another and could be the cause of erroneous medical  interpretations. If there is a change in the CEA assay procedure used while  monitoring therapy, then new baselines may need to be established when  comparing previous results.     • Sodium 10/04/2021 134* 135 - 145 mmol/L Final   • Potassium 10/04/2021 4.2  3.6 - 5.5 mmol/L Final   • Chloride 10/04/2021 104  96 - 112 mmol/L Final   • Co2 10/04/2021 18* 20 - 33 mmol/L Final   • Anion Gap 10/04/2021 12.0  7.0 - 16.0 Final   • Glucose 10/04/2021 210* 65 - 99 mg/dL Final   • Bun 10/04/2021 20  8 - 22 mg/dL Final   • Creatinine 10/04/2021 0.91  0.50 - 1.40 mg/dL Final   • Calcium 10/04/2021 9.6  8.5 - 10.5 mg/dL Final   • AST(SGOT) 10/04/2021 20  12 - 45 U/L Final   • ALT(SGPT) 10/04/2021 19  2 - 50 U/L Final   • Alkaline Phosphatase 10/04/2021 192* 30 - 99 U/L Final   • Total Bilirubin 10/04/2021 0.3  0.1 - 1.5 mg/dL Final   • Albumin 10/04/2021 2.7* 3.2 - 4.9 g/dL Final   • Total Protein 10/04/2021 6.3  6.0 - 8.2 g/dL Final   • Globulin 10/04/2021 3.6* 1.9 - 3.5 g/dL Final   • A-G Ratio 10/04/2021 0.8  g/dL Final   • WBC 10/04/2021 14.4* 4.8 - 10.8 K/uL Final   • RBC 10/04/2021 3.92* 4.20 - 5.40 M/uL Final   • Hemoglobin 10/04/2021 10.8* 12.0 - 16.0 g/dL Final   • Hematocrit 10/04/2021 34.7* 37.0 - 47.0 % Final   • MCV 10/04/2021 88.5  81.4 - 97.8 fL Final   • MCH  10/04/2021 27.6  27.0 - 33.0 pg Final   • MCHC 10/04/2021 31.1* 33.6 - 35.0 g/dL Final   • RDW 10/04/2021 62.1* 35.9 - 50.0 fL Final   • Platelet Count 10/04/2021 340  164 - 446 K/uL Final   • MPV 10/04/2021 9.4  9.0 - 12.9 fL Final   • Neutrophils-Polys 10/04/2021 85.70* 44.00 - 72.00 % Final   • Lymphocytes 10/04/2021 6.10* 22.00 - 41.00 % Final   • Monocytes 10/04/2021 3.80  0.00 - 13.40 % Final   • Eosinophils 10/04/2021 2.50  0.00 - 6.90 % Final   • Basophils 10/04/2021 0.50  0.00 - 1.80 % Final   • Immature Granulocytes 10/04/2021 1.40* 0.00 - 0.90 % Final   • Nucleated RBC 10/04/2021 0.00  /100 WBC Final   • Neutrophils (Absolute) 10/04/2021 12.30* 2.00 - 7.15 K/uL Final    Includes immature neutrophils, if present.   • Lymphs (Absolute) 10/04/2021 0.87* 1.00 - 4.80 K/uL Final   • Monos (Absolute) 10/04/2021 0.55  0.00 - 0.85 K/uL Final   • Eos (Absolute) 10/04/2021 0.36  0.00 - 0.51 K/uL Final   • Baso (Absolute) 10/04/2021 0.07  0.00 - 0.12 K/uL Final   • Immature Granulocytes (abs) 10/04/2021 0.20* 0.00 - 0.11 K/uL Final   • NRBC (Absolute) 10/04/2021 0.00  K/uL Final   • GFR If  10/04/2021 >60  >60 mL/min/1.73 m 2 Final   • GFR If Non  10/04/2021 59* >60 mL/min/1.73 m 2 Final       Imaging:     All listed images below have been independently reviewed by me. I agree with the findings as summarized below:    DX-CHEST-2 VIEWS    Result Date: 8/26/2021 8/26/2021 3:45 PM HISTORY/REASON FOR EXAM:  Shortness of Breath TECHNIQUE/EXAM DESCRIPTION: PA and lateral views of the chest. COMPARISON:  Chest x-ray 8/5/2021 FINDINGS: There is a left IJ Port-A-Cath with its distal tip extending into the right atrium. There is a slight curvilinear opacity in the right lung base. The cardiac silhouette is normal in size. No effusions or pneumothoraces are present. There are no significant osseous abnormalities. The visualized portions of the upper abdomen are within normal limits.     1.   Slight right basilar atelectasis. 2.  Left IJ Port-A-Cath in place.      Pathology:      ADDENDUM:     This case is being addended to document that slides of our case were   sent to [facility] and their report was generated. Please see separate [facility]   report.     Addendum signed by:  Norma Anderson MD   Addendum date:  07/27/2015   Original Report Date:  09/28/2012                                  ** ADDENDUM **     ADDENDUM:     At the request of Dr. Radha Cotter, a tissue block (A6) is sent to   [facility] for KRAS extended analysis.     Addendum Signed By:  Yadira Mora MD   Addendum Date:  07/17/2015   Original Report Date:  09/28/2012                                  ** ADDENDUM **     ADDENDUM:     This case is being addended to report the results of IHC studies for   mismatch repair proteins to evaluate for Peacock syndrome. The technical   component of the IHC studies was performed by [facility], and   the interpretation was performed by Dr. Anderson at [facility]. All four   mismatch repair proteins are expressed in the tumor cells. These   findings make Peacock syndrome unlikely. These results are also reported   in the IHC section below.     Addendum Signed By: Norma Anderson M.D.   Addendum Date:  10/3/2012   Original Report Date:  09/28/2012     FINAL DIAGNOSIS:     A. Rectum:          Invasive low-grade (well-differentiated) adenocarcinoma arising           in the background of a tubular adenoma.          Invasion into the superficial most aspect of the muscularis           propria is present (pT2).          The tumor is located 1 cm above the peritoneal reflection in the           upper rectum (intraperitoneal rectum).          Surgical margins free of involvement by at least 1 cm.          Thirteen benign lymph nodes identified in this specimen (0/13).          See synoptic report below for complete details.   B. Rectal donuts:          Fragments of benign colonic mucosa consistent with anastomotic            rings.   C. Ileocolic:          Segment of distal ileum, cecum, and proximal ascending colon           demonstrating two tubular adenomas in the cecum. No high-grade           dysplasia identified.          Seventeen benign lymph nodes identified in this specimen (0/17).          No appendix identified.     Synoptic Report for Primary Carcinomas of Colon/Rectum:          -Specimen: Rectum, terminal ileum, cecum, and proximal ascending         colon.        -Procedure: Low anterior resection and right hemicolectomy.        -Specimen Length: 11.5 cm (rectum specimen).        -Tumor Site: Upper rectum.        -Tumor Location: Tumor is located 1 cm above the peritoneal         reflection.        -Tumor Size: 1.2 cm in maximal dimension.        -Macroscopic Tumor Perforation: Not identified.        -Histologic Type: Adenocarcinoma.        -Histologic Grade: Low-grade (well-differentiated).        -Microscopic Tumor Extension: Tumor focally invades the         superficial most aspect of the muscularis propria.        -Margins:         Proximal Margin: Uninvolved by invasive carcinoma.         Distal Margin: Uninvolved by invasive carcinoma.         Circumferential (Radial) or Mesenteric Margin: Uninvolved by          invasive carcinoma.         Lateral Margin: Not applicable.         Distance from closest margin: 1 cm, distal.        -Treatment Effect: No prior treatment.        -Lymph-Vascular Invasion: Not identified.        -Perineural Invasion: Not identified.        -Tumor Deposits(discontinuous extramural extension): Not         identified.        -Type of Polyp in Which Invasive Carcinoma Arose: Tubular adenoma.        -Pathologic Staging:         Primary Tumor: pT2         Regional Lymph Nodes: pN0           Number of lymph nodes examined: 30           Number of lymph nodes involved: 0         Distant Metastasis: Not applicable.        -Additional Pathologic Findings: Tubular adenomas (2) present in          cecum.        -Ancillary Studies:         Immunohistochemistry studies for mismatched repair proteins:          -MLH1: Expressed.          -MSH2: Expressed.          -MSH6: Expressed.          -PMS2: Expressed.     Assessment & Plan:  1. Malignant neoplasm of rectum (HCC)  CBC WITH DIFFERENTIAL    Comp Metabolic Panel    CEA     This is an 81-year-old  woman with metastatic rectal adenocarcinoma.  She has been on treatment for very long time with 5-FU, and was pulled off due to concern for toxicity.  She has most recently been on irinotecan and bevacizumab, but has not been able to tolerate this in light of a worsening pulmonary process.  She is being managed by pulmonology for suspected retrograde aspiration with nebulizer therapy and guaifenesin.     At this time, due to her pulmonary process causing worsening shortness of breath, I would not pursue systemic treatment until the underlying etiology of this pulmonary process is identified and her performance status improves.  Her CEA will continue to be monitored, but unless the pulmonary symptoms improve, any systemic treatment may ultimately cause more harm than benefit.    Her CEA actually decreased from 4.2 to 3.0. She is clinically doing a little better, so I have discontinued the chemotherapy for now. She has an MRI scheduled for 2 weeks from now, and if there are any concerning findings we will revisit her therapeutic options. I would opt for re-treatment with 5-FU +/- bevacizumab if necessary. However, at this time, given her performance status and her stability of disease, we will continue with active surveillance.     We will plan on seeing her again in 3 months for labs (CBC, CMP, CEA) and clinic visit. She would like to continue with weekly hydration, so we will continue that and monitor her labs monthly.     1. Hold systemic therapy at this time until performance status improves. Continue active surveillance.   2. Monitor CEA along with CBC  and CMP, will check again in 3 months.   3. If able to restart therapy, would opt to again treat with single agent 5-FU.  4. Return to clinic in 3 months.  5. Continue weekly hydration for supportive care.     Any questions and concerns raised by the patient were answered to the best of my ability. Thank you for allowing me to participate in the care for this patient. Please feel free to contact me for any questions or concerns.       Total time spent on chart review, clinic encounter, and documentation: 24 minutes.

## 2021-10-10 ENCOUNTER — APPOINTMENT (OUTPATIENT)
Dept: RADIOLOGY | Facility: MEDICAL CENTER | Age: 81
DRG: 853 | End: 2021-10-10
Attending: EMERGENCY MEDICINE
Payer: MEDICARE

## 2021-10-10 ENCOUNTER — TELEPHONE (OUTPATIENT)
Dept: HEMATOLOGY ONCOLOGY | Facility: MEDICAL CENTER | Age: 81
End: 2021-10-10

## 2021-10-10 ENCOUNTER — HOSPITAL ENCOUNTER (INPATIENT)
Facility: MEDICAL CENTER | Age: 81
LOS: 11 days | DRG: 853 | End: 2021-10-22
Attending: EMERGENCY MEDICINE | Admitting: STUDENT IN AN ORGANIZED HEALTH CARE EDUCATION/TRAINING PROGRAM
Payer: MEDICARE

## 2021-10-10 DIAGNOSIS — R11.2 NON-INTRACTABLE VOMITING WITH NAUSEA, UNSPECIFIED VOMITING TYPE: ICD-10-CM

## 2021-10-10 DIAGNOSIS — Z86.711 HISTORY OF PULMONARY EMBOLUS (PE): ICD-10-CM

## 2021-10-10 DIAGNOSIS — R73.9 HYPERGLYCEMIA: ICD-10-CM

## 2021-10-10 DIAGNOSIS — C20 MALIGNANT NEOPLASM OF RECTUM (HCC): Chronic | ICD-10-CM

## 2021-10-10 DIAGNOSIS — C18.9 METASTATIC COLON CANCER IN FEMALE: ICD-10-CM

## 2021-10-10 DIAGNOSIS — C19 METASTATIC COLORECTAL CANCER: ICD-10-CM

## 2021-10-10 DIAGNOSIS — D72.829 LEUKOCYTOSIS, UNSPECIFIED TYPE: ICD-10-CM

## 2021-10-10 DIAGNOSIS — E87.29 INCREASED ANION GAP METABOLIC ACIDOSIS: ICD-10-CM

## 2021-10-10 DIAGNOSIS — R55 SYNCOPE, UNSPECIFIED SYNCOPE TYPE: ICD-10-CM

## 2021-10-10 DIAGNOSIS — R06.02 SOB (SHORTNESS OF BREATH): ICD-10-CM

## 2021-10-10 DIAGNOSIS — C78.7 SECONDARY MALIGNANT NEOPLASM OF LIVER (HCC): Chronic | ICD-10-CM

## 2021-10-10 DIAGNOSIS — R10.84 GENERALIZED ABDOMINAL PAIN: ICD-10-CM

## 2021-10-10 DIAGNOSIS — F41.9 ANXIETY: ICD-10-CM

## 2021-10-10 DIAGNOSIS — C20 RECTAL ADENOCARCINOMA (HCC): ICD-10-CM

## 2021-10-10 DIAGNOSIS — N17.9 AKI (ACUTE KIDNEY INJURY) (HCC): ICD-10-CM

## 2021-10-10 DIAGNOSIS — R05.8 COUGH WITH SPUTUM: ICD-10-CM

## 2021-10-10 DIAGNOSIS — J69.0 ASPIRATION PNEUMONIA, UNSPECIFIED ASPIRATION PNEUMONIA TYPE, UNSPECIFIED LATERALITY, UNSPECIFIED PART OF LUNG (HCC): ICD-10-CM

## 2021-10-10 DIAGNOSIS — K56.609 SBO (SMALL BOWEL OBSTRUCTION) (HCC): ICD-10-CM
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
## 2021-10-10 DIAGNOSIS — R05.9 COUGH: ICD-10-CM

## 2021-10-10 LAB
ALBUMIN SERPL BCP-MCNC: 3.5 G/DL (ref 3.2–4.9)
ALBUMIN/GLOB SERPL: 0.8 G/DL
ALP SERPL-CCNC: 264 U/L (ref 30–99)
ALT SERPL-CCNC: 21 U/L (ref 2–50)
ANION GAP SERPL CALC-SCNC: 17 MMOL/L (ref 7–16)
AST SERPL-CCNC: 26 U/L (ref 12–45)
BASOPHILS # BLD AUTO: 0.4 % (ref 0–1.8)
BASOPHILS # BLD: 0.11 K/UL (ref 0–0.12)
BILIRUB SERPL-MCNC: 0.5 MG/DL (ref 0.1–1.5)
BUN SERPL-MCNC: 28 MG/DL (ref 8–22)
CALCIUM SERPL-MCNC: 10.5 MG/DL (ref 8.5–10.5)
CHLORIDE SERPL-SCNC: 98 MMOL/L (ref 96–112)
CO2 SERPL-SCNC: 15 MMOL/L (ref 20–33)
CREAT SERPL-MCNC: 1.5 MG/DL (ref 0.5–1.4)
EOSINOPHIL # BLD AUTO: 0.01 K/UL (ref 0–0.51)
EOSINOPHIL NFR BLD: 0 % (ref 0–6.9)
ERYTHROCYTE [DISTWIDTH] IN BLOOD BY AUTOMATED COUNT: 62.8 FL (ref 35.9–50)
GLOBULIN SER CALC-MCNC: 4.3 G/DL (ref 1.9–3.5)
GLUCOSE SERPL-MCNC: 209 MG/DL (ref 65–99)
HCT VFR BLD AUTO: 43.8 % (ref 37–47)
HGB BLD-MCNC: 13.8 G/DL (ref 12–16)
IMM GRANULOCYTES # BLD AUTO: 0.38 K/UL (ref 0–0.11)
IMM GRANULOCYTES NFR BLD AUTO: 1.3 % (ref 0–0.9)
LACTATE BLD-SCNC: 2.7 MMOL/L (ref 0.5–2)
LIPASE SERPL-CCNC: 54 U/L (ref 11–82)
LYMPHOCYTES # BLD AUTO: 0.69 K/UL (ref 1–4.8)
LYMPHOCYTES NFR BLD: 2.4 % (ref 22–41)
MCH RBC QN AUTO: 27.8 PG (ref 27–33)
MCHC RBC AUTO-ENTMCNC: 31.5 G/DL (ref 33.6–35)
MCV RBC AUTO: 88.1 FL (ref 81.4–97.8)
MONOCYTES # BLD AUTO: 0.96 K/UL (ref 0–0.85)
MONOCYTES NFR BLD AUTO: 3.3 % (ref 0–13.4)
NEUTROPHILS # BLD AUTO: 27.12 K/UL (ref 2–7.15)
NEUTROPHILS NFR BLD: 92.6 % (ref 44–72)
NRBC # BLD AUTO: 0 K/UL
NRBC BLD-RTO: 0 /100 WBC
PLATELET # BLD AUTO: 560 K/UL (ref 164–446)
PMV BLD AUTO: 9 FL (ref 9–12.9)
POTASSIUM SERPL-SCNC: 5.5 MMOL/L (ref 3.6–5.5)
PROCALCITONIN SERPL-MCNC: 0.55 NG/ML
PROT SERPL-MCNC: 7.8 G/DL (ref 6–8.2)
RBC # BLD AUTO: 4.97 M/UL (ref 4.2–5.4)
SODIUM SERPL-SCNC: 130 MMOL/L (ref 135–145)
TROPONIN T SERPL-MCNC: 17 NG/L (ref 6–19)
WBC # BLD AUTO: 29.3 K/UL (ref 4.8–10.8)

## 2021-10-10 PROCEDURE — 85025 COMPLETE CBC W/AUTO DIFF WBC: CPT

## 2021-10-10 PROCEDURE — 83605 ASSAY OF LACTIC ACID: CPT

## 2021-10-10 PROCEDURE — 84484 ASSAY OF TROPONIN QUANT: CPT

## 2021-10-10 PROCEDURE — 84145 PROCALCITONIN (PCT): CPT

## 2021-10-10 PROCEDURE — 94760 N-INVAS EAR/PLS OXIMETRY 1: CPT

## 2021-10-10 PROCEDURE — 83690 ASSAY OF LIPASE: CPT

## 2021-10-10 PROCEDURE — 71045 X-RAY EXAM CHEST 1 VIEW: CPT

## 2021-10-10 PROCEDURE — 83735 ASSAY OF MAGNESIUM: CPT

## 2021-10-10 PROCEDURE — 700105 HCHG RX REV CODE 258: Performed by: EMERGENCY MEDICINE

## 2021-10-10 PROCEDURE — 80053 COMPREHEN METABOLIC PANEL: CPT

## 2021-10-10 PROCEDURE — 99285 EMERGENCY DEPT VISIT HI MDM: CPT

## 2021-10-10 PROCEDURE — 700111 HCHG RX REV CODE 636 W/ 250 OVERRIDE (IP): Performed by: EMERGENCY MEDICINE

## 2021-10-10 PROCEDURE — 96375 TX/PRO/DX INJ NEW DRUG ADDON: CPT

## 2021-10-10 PROCEDURE — 87040 BLOOD CULTURE FOR BACTERIA: CPT

## 2021-10-10 PROCEDURE — 93005 ELECTROCARDIOGRAM TRACING: CPT | Performed by: EMERGENCY MEDICINE

## 2021-10-10 PROCEDURE — 96376 TX/PRO/DX INJ SAME DRUG ADON: CPT

## 2021-10-10 RX ORDER — PROCHLORPERAZINE EDISYLATE 5 MG/ML
5 INJECTION INTRAMUSCULAR; INTRAVENOUS ONCE
Status: COMPLETED | OUTPATIENT
Start: 2021-10-10 | End: 2021-10-10

## 2021-10-10 RX ORDER — SODIUM CHLORIDE, SODIUM LACTATE, POTASSIUM CHLORIDE, CALCIUM CHLORIDE 600; 310; 30; 20 MG/100ML; MG/100ML; MG/100ML; MG/100ML
1000 INJECTION, SOLUTION INTRAVENOUS ONCE
Status: COMPLETED | OUTPATIENT
Start: 2021-10-10 | End: 2021-10-11

## 2021-10-10 RX ORDER — MORPHINE SULFATE 4 MG/ML
4 INJECTION, SOLUTION INTRAMUSCULAR; INTRAVENOUS ONCE
Status: COMPLETED | OUTPATIENT
Start: 2021-10-10 | End: 2021-10-10

## 2021-10-10 RX ADMIN — PROCHLORPERAZINE EDISYLATE 5 MG: 5 INJECTION INTRAMUSCULAR; INTRAVENOUS at 23:03

## 2021-10-10 RX ADMIN — SODIUM CHLORIDE, POTASSIUM CHLORIDE, SODIUM LACTATE AND CALCIUM CHLORIDE 1000 ML: 600; 310; 30; 20 INJECTION, SOLUTION INTRAVENOUS at 23:06

## 2021-10-10 RX ADMIN — MORPHINE SULFATE 4 MG: 4 INJECTION INTRAVENOUS at 23:04

## 2021-10-10 ASSESSMENT — PAIN DESCRIPTION - PAIN TYPE: TYPE: ACUTE PAIN

## 2021-10-10 ASSESSMENT — FIBROSIS 4 INDEX: FIB4 SCORE: 1.09

## 2021-10-10 NOTE — TELEPHONE ENCOUNTER
After hours phone call  Call on 10/10/2021 at 12:08 PM from Karlene Walters [phone] (home) [phone] (work) who reports PCP: Manan Quiñonez M.D..    Pt reports:  Persistent abdominal/diaphragm pain, not worsening today but not improving either. O2 needs have not increased, she is using nebulizer as directed per Pulmonary and reports SOB is stable, not worse.  She has a poor appetite but no nausea/vomiting.  She denies chest pain, neck/jaw pain, arm pain.   No c/o fever.    Plan:  She is in [place] and prefers not to follow up at the local hospital. She is coming to [place] tomorrow for certain, may leave this evening. Last CT was 9/23/21 indicating fluid collection under right diaphragm - appears consistent with patient's current symptoms. She is hesitant to take oxycodone due to effect and is encouraged to start with 1/2 tablet and monitor.  Given ongoing pulmonary issues, will order CXR to be completed locally for evaluation of possible effusion/pneumonia and will update staff for further follow up on Monday as indicated.  Patient will proceed to ER if symptoms worsen.      CT Chest  9/23/2021 12:59 PM  HISTORY/REASON FOR EXAM:  Cough, persistent; persistent cough.  History of rectal cancer with metastases to liver.     TECHNIQUE/EXAM DESCRIPTION:  CT scan of the chest without contrast.     Noncontrast helical scanning of the chest was obtained from the lung apices through the adrenal glands.     Low dose optimization technique was utilized for this CT exam including automated exposure control and adjustment of the mA and/or kV according to patient size.     COMPARISON: 7/20/2021 chest CT, 4/5/2021 abdominal MRI     FINDINGS:  Lungs: There is redemonstration of BILATERAL lower lobe bronchiectasis. There are faint areas of tree-in-bud pattern airspace disease in the posterior RIGHT upper lobe and more conspicuously in the lower lobes. There is decreased BILATERAL lower lobe   peribronchial volume loss  and consolidation though some of this persists in the RIGHT lower lobe. No suspicious nodule or mass.     Mediastinum/Randa: No significant adenopathy.     Pleura: No pleural effusion.     Cardiac: Heart normal in size without pericardial effusion.     Vascular: There is scattered calcific atherosclerotic plaque. There is a LEFT internal jugular chest port with its tip in the RIGHT atrium.     Soft tissues: Unremarkable.     Bones: No acute or destructive process.     Heterogeneity with underlying partially calcified mass is redemonstrated in the RIGHT liver dome. There is a RIGHT-sided diaphragmatic area of fluid measuring 9.9 x 8.1 x 3.1 cm.     IMPRESSION:     1.  9.9 x 8.1 x 3.1 cm fluid collection under and compressing the RIGHT diaphragm, adjacent to the patient's known RIGHT hepatic mass  2.  Persistent but overall decreased BILATERAL dependent chronic appearing airspace disease, possibly related to aspiration pneumonia. This remains most advanced in the RIGHT lower lobe overlying the subdiaphragmatic fluid collection.      BROWN Braden    [facility] Hematology/Medical Oncology  Office of Dr. Steele & Dr. Bello  Phone: [phone]  Fax: [phone]

## 2021-10-10 NOTE — TELEPHONE ENCOUNTER
After hours phone call  Call on 10/10/2021 at 3:39 PM from Karlene Walters [phone] (home) [phone] (work) who reports    Pt reports:  Oxycodone did not relieve discomfort reported earlier today.   Continues without fever chills.  New c/o nausea, firm abdomen, no ostomy output today, retching noted during call.    Plan:  Family will bring patient to ER in [place] for further evaluation.  Chemo has been on hold since approx. 9/15/21 given declining performance status (SOB, pulmonary status, dehydration).  She has been receiving supportive hydration per [facility] approximately 1-3 times per week.  It is not unreasonable to expect SBO r/t disease progression vs obstipation.    ER charge updated as to patient departure from [place] and proceeding to ER.      BROWN Braden Hematology/Medical Oncology  Office of Dr. Bello  Phone: [phone]  Fax: [phone]

## 2021-10-11 ENCOUNTER — APPOINTMENT (OUTPATIENT)
Dept: RADIOLOGY | Facility: MEDICAL CENTER | Age: 81
DRG: 853 | End: 2021-10-11
Attending: NURSE PRACTITIONER
Payer: MEDICARE

## 2021-10-11 ENCOUNTER — APPOINTMENT (OUTPATIENT)
Dept: ONCOLOGY | Facility: MEDICAL CENTER | Age: 81
End: 2021-10-11
Attending: INTERNAL MEDICINE
Payer: MEDICARE

## 2021-10-11 ENCOUNTER — APPOINTMENT (OUTPATIENT)
Dept: HEMATOLOGY ONCOLOGY | Facility: MEDICAL CENTER | Age: 81
End: 2021-10-11
Payer: MEDICARE

## 2021-10-11 PROBLEM — R05.8 COUGH WITH SPUTUM: Status: ACTIVE | Noted: 2021-10-11

## 2021-10-11 PROBLEM — C19 METASTATIC COLORECTAL CANCER: Status: ACTIVE | Noted: 2021-10-11

## 2021-10-11 PROBLEM — A41.9 SEPSIS (HCC): Status: ACTIVE | Noted: 2021-10-11

## 2021-10-11 PROBLEM — E87.29 METABOLIC ACIDOSIS, INCREASED ANION GAP (IAG): Status: ACTIVE | Noted: 2021-10-11

## 2021-10-11 PROBLEM — K56.609 SMALL BOWEL OBSTRUCTION (HCC): Status: ACTIVE | Noted: 2021-10-11

## 2021-10-11 PROBLEM — K63.89 HEPATIC FLEXURE MASS: Status: ACTIVE | Noted: 2021-10-11

## 2021-10-11 LAB
EKG IMPRESSION: NORMAL
INR PPP: 1.12 (ref 0.87–1.13)
LACTATE BLD-SCNC: 2.1 MMOL/L (ref 0.5–2)
LACTATE BLD-SCNC: 2.2 MMOL/L (ref 0.5–2)
LACTATE BLD-SCNC: 2.7 MMOL/L (ref 0.5–2)
MAGNESIUM SERPL-MCNC: 2 MG/DL (ref 1.5–2.5)
PROCALCITONIN SERPL-MCNC: 0.95 NG/ML
PROTHROMBIN TIME: 14.1 SEC (ref 12–14.6)

## 2021-10-11 PROCEDURE — 85610 PROTHROMBIN TIME: CPT

## 2021-10-11 PROCEDURE — 83605 ASSAY OF LACTIC ACID: CPT | Mod: 91

## 2021-10-11 PROCEDURE — 700102 HCHG RX REV CODE 250 W/ 637 OVERRIDE(OP): Performed by: STUDENT IN AN ORGANIZED HEALTH CARE EDUCATION/TRAINING PROGRAM

## 2021-10-11 PROCEDURE — 306581 SET INFUSION,POWERLOC 20G X 1: Performed by: NURSE PRACTITIONER

## 2021-10-11 PROCEDURE — 99222 1ST HOSP IP/OBS MODERATE 55: CPT | Performed by: SURGERY

## 2021-10-11 PROCEDURE — A9270 NON-COVERED ITEM OR SERVICE: HCPCS | Performed by: STUDENT IN AN ORGANIZED HEALTH CARE EDUCATION/TRAINING PROGRAM

## 2021-10-11 PROCEDURE — 99223 1ST HOSP IP/OBS HIGH 75: CPT | Performed by: STUDENT IN AN ORGANIZED HEALTH CARE EDUCATION/TRAINING PROGRAM

## 2021-10-11 PROCEDURE — 84145 PROCALCITONIN (PCT): CPT

## 2021-10-11 PROCEDURE — 74177 CT ABD & PELVIS W/CONTRAST: CPT | Mod: ME

## 2021-10-11 PROCEDURE — 700117 HCHG RX CONTRAST REV CODE 255: Performed by: EMERGENCY MEDICINE

## 2021-10-11 PROCEDURE — 87040 BLOOD CULTURE FOR BACTERIA: CPT

## 2021-10-11 PROCEDURE — 700111 HCHG RX REV CODE 636 W/ 250 OVERRIDE (IP): Performed by: NURSE PRACTITIONER

## 2021-10-11 PROCEDURE — 700105 HCHG RX REV CODE 258: Performed by: NURSE PRACTITIONER

## 2021-10-11 PROCEDURE — 94669 MECHANICAL CHEST WALL OSCILL: CPT

## 2021-10-11 PROCEDURE — 700101 HCHG RX REV CODE 250: Performed by: NURSE PRACTITIONER

## 2021-10-11 PROCEDURE — 94664 DEMO&/EVAL PT USE INHALER: CPT

## 2021-10-11 PROCEDURE — 94760 N-INVAS EAR/PLS OXIMETRY 1: CPT

## 2021-10-11 PROCEDURE — 770004 HCHG ROOM/CARE - ONCOLOGY PRIVATE *

## 2021-10-11 PROCEDURE — 700105 HCHG RX REV CODE 258: Performed by: STUDENT IN AN ORGANIZED HEALTH CARE EDUCATION/TRAINING PROGRAM

## 2021-10-11 PROCEDURE — 700111 HCHG RX REV CODE 636 W/ 250 OVERRIDE (IP): Performed by: STUDENT IN AN ORGANIZED HEALTH CARE EDUCATION/TRAINING PROGRAM

## 2021-10-11 PROCEDURE — 700101 HCHG RX REV CODE 250: Performed by: STUDENT IN AN ORGANIZED HEALTH CARE EDUCATION/TRAINING PROGRAM

## 2021-10-11 RX ORDER — ONDANSETRON 4 MG/1
4 TABLET, ORALLY DISINTEGRATING ORAL EVERY 4 HOURS PRN
Status: DISCONTINUED | OUTPATIENT
Start: 2021-10-11 | End: 2021-10-22 | Stop reason: HOSPADM

## 2021-10-11 RX ORDER — BISACODYL 10 MG
10 SUPPOSITORY, RECTAL RECTAL
Status: DISCONTINUED | OUTPATIENT
Start: 2021-10-11 | End: 2021-10-22 | Stop reason: HOSPADM

## 2021-10-11 RX ORDER — GUAIFENESIN/DEXTROMETHORPHAN 100-10MG/5
5 SYRUP ORAL EVERY 6 HOURS PRN
Status: DISCONTINUED | OUTPATIENT
Start: 2021-10-11 | End: 2021-10-22 | Stop reason: HOSPADM

## 2021-10-11 RX ORDER — AMOXICILLIN 250 MG
2 CAPSULE ORAL 2 TIMES DAILY
Status: DISCONTINUED | OUTPATIENT
Start: 2021-10-11 | End: 2021-10-22 | Stop reason: HOSPADM

## 2021-10-11 RX ORDER — POLYETHYLENE GLYCOL 3350 17 G/17G
1 POWDER, FOR SOLUTION ORAL
Status: DISCONTINUED | OUTPATIENT
Start: 2021-10-11 | End: 2021-10-22 | Stop reason: HOSPADM

## 2021-10-11 RX ORDER — ONDANSETRON 2 MG/ML
4 INJECTION INTRAMUSCULAR; INTRAVENOUS EVERY 4 HOURS PRN
Status: DISCONTINUED | OUTPATIENT
Start: 2021-10-11 | End: 2021-10-22 | Stop reason: HOSPADM

## 2021-10-11 RX ORDER — LABETALOL HYDROCHLORIDE 5 MG/ML
10 INJECTION, SOLUTION INTRAVENOUS EVERY 4 HOURS PRN
Status: DISCONTINUED | OUTPATIENT
Start: 2021-10-11 | End: 2021-10-19

## 2021-10-11 RX ORDER — ACETAMINOPHEN 325 MG/1
650 TABLET ORAL EVERY 6 HOURS PRN
Status: DISCONTINUED | OUTPATIENT
Start: 2021-10-11 | End: 2021-10-22 | Stop reason: HOSPADM

## 2021-10-11 RX ORDER — OXYCODONE HYDROCHLORIDE 5 MG/1
5 TABLET ORAL
Status: DISCONTINUED | OUTPATIENT
Start: 2021-10-11 | End: 2021-10-22 | Stop reason: HOSPADM

## 2021-10-11 RX ORDER — SODIUM CHLORIDE, SODIUM LACTATE, POTASSIUM CHLORIDE, CALCIUM CHLORIDE 600; 310; 30; 20 MG/100ML; MG/100ML; MG/100ML; MG/100ML
INJECTION, SOLUTION INTRAVENOUS CONTINUOUS
Status: ACTIVE | OUTPATIENT
Start: 2021-10-11 | End: 2021-10-11

## 2021-10-11 RX ORDER — TIOTROPIUM BROMIDE AND OLODATEROL 3.124; 2.736 UG/1; UG/1
2 SPRAY, METERED RESPIRATORY (INHALATION) DAILY
COMMUNITY

## 2021-10-11 RX ORDER — HEPARIN SODIUM 5000 [USP'U]/ML
5000 INJECTION, SOLUTION INTRAVENOUS; SUBCUTANEOUS EVERY 8 HOURS
Status: DISCONTINUED | OUTPATIENT
Start: 2021-10-11 | End: 2021-10-19

## 2021-10-11 RX ORDER — OXYCODONE HYDROCHLORIDE 5 MG/1
2.5 TABLET ORAL
Status: DISCONTINUED | OUTPATIENT
Start: 2021-10-11 | End: 2021-10-22 | Stop reason: HOSPADM

## 2021-10-11 RX ORDER — ENALAPRILAT 1.25 MG/ML
1.25 INJECTION INTRAVENOUS EVERY 6 HOURS PRN
Status: DISCONTINUED | OUTPATIENT
Start: 2021-10-11 | End: 2021-10-19

## 2021-10-11 RX ORDER — ALBUTEROL SULFATE 90 UG/1
2 AEROSOL, METERED RESPIRATORY (INHALATION) EVERY 6 HOURS PRN
Status: DISCONTINUED | OUTPATIENT
Start: 2021-10-11 | End: 2021-10-22 | Stop reason: HOSPADM

## 2021-10-11 RX ORDER — MORPHINE SULFATE 4 MG/ML
2 INJECTION, SOLUTION INTRAMUSCULAR; INTRAVENOUS
Status: DISCONTINUED | OUTPATIENT
Start: 2021-10-11 | End: 2021-10-13

## 2021-10-11 RX ADMIN — CEFTRIAXONE SODIUM 1 G: 1 INJECTION, POWDER, FOR SOLUTION INTRAMUSCULAR; INTRAVENOUS at 09:16

## 2021-10-11 RX ADMIN — ONDANSETRON 4 MG: 2 INJECTION INTRAMUSCULAR; INTRAVENOUS at 04:00

## 2021-10-11 RX ADMIN — OXYCODONE 5 MG: 5 TABLET ORAL at 22:27

## 2021-10-11 RX ADMIN — OXYCODONE 5 MG: 5 TABLET ORAL at 18:31

## 2021-10-11 RX ADMIN — HEPARIN SODIUM 5000 UNITS: 5000 INJECTION, SOLUTION INTRAVENOUS; SUBCUTANEOUS at 06:33

## 2021-10-11 RX ADMIN — METRONIDAZOLE 500 MG: 500 INJECTION, SOLUTION INTRAVENOUS at 22:09

## 2021-10-11 RX ADMIN — METRONIDAZOLE 500 MG: 500 INJECTION, SOLUTION INTRAVENOUS at 15:07

## 2021-10-11 RX ADMIN — HEPARIN SODIUM 5000 UNITS: 5000 INJECTION, SOLUTION INTRAVENOUS; SUBCUTANEOUS at 14:57

## 2021-10-11 RX ADMIN — METRONIDAZOLE 500 MG: 500 INJECTION, SOLUTION INTRAVENOUS at 06:32

## 2021-10-11 RX ADMIN — HEPARIN SODIUM 5000 UNITS: 5000 INJECTION, SOLUTION INTRAVENOUS; SUBCUTANEOUS at 22:09

## 2021-10-11 RX ADMIN — SODIUM CHLORIDE, POTASSIUM CHLORIDE, SODIUM LACTATE AND CALCIUM CHLORIDE 1000 ML: 600; 310; 30; 20 INJECTION, SOLUTION INTRAVENOUS at 04:00

## 2021-10-11 RX ADMIN — IOHEXOL 100 ML: 350 INJECTION, SOLUTION INTRAVENOUS at 00:30

## 2021-10-11 ASSESSMENT — ENCOUNTER SYMPTOMS
PALPITATIONS: 0
INSOMNIA: 0
FEVER: 0
BACK PAIN: 0
BLOOD IN STOOL: 0
HEARTBURN: 0
LOSS OF CONSCIOUSNESS: 0
DOUBLE VISION: 0
NECK PAIN: 0
DIARRHEA: 0
SORE THROAT: 0
HEADACHES: 0
ABDOMINAL PAIN: 1
COUGH: 1
BRUISES/BLEEDS EASILY: 0
SHORTNESS OF BREATH: 1
WEAKNESS: 0
WHEEZING: 0
VOMITING: 1
FOCAL WEAKNESS: 0
SPUTUM PRODUCTION: 1
CHILLS: 0
DEPRESSION: 0
BLURRED VISION: 0
NAUSEA: 1
CONSTIPATION: 0

## 2021-10-11 ASSESSMENT — PAIN DESCRIPTION - PAIN TYPE
TYPE: ACUTE PAIN
TYPE: ACUTE PAIN

## 2021-10-11 NOTE — ASSESSMENT & PLAN NOTE
CXR revealed basilar atelectasis  Procalcitonin elevated, completed 5 days of ceftriaxone which would cover CAP  PRN antitussives

## 2021-10-11 NOTE — ED NOTES
"Med Rec completed: per pt    No ORAL antibiotics in last 30 days    Preferred Pharmacy: [facility]    Pt confirmed following allergies:  Allergies   Allergen Reactions   • Oxaliplatin Anaphylaxis   • Amoxicillin Rash     Rash all over body   • Codeine      \"gets drunk\"   • Pcn [Penicillins] Itching     itching   • Sulfa Drugs Itching     itching   • Tape Rash     PAPER TAPE OK        Pt's home medications:   No current facility-administered medications on file prior to encounter.     Medication Sig   • Tiotropium Bromide-Olodaterol (STIOLTO RESPIMAT) 2.5-2.5 MCG/ACT Aero Soln Inhale 2 Puffs every day.   • sodium chloride (HYPER-SAL) 7 % Nebu Soln Take 4 mL by nebulization every day.   • XARELTO 10 MG Tab tablet Take 1 tablet by mouth once daily   • ondansetron (ZOFRAN) 4 MG Tab tablet Take 1 tablet by mouth every four hours as needed for Nausea/Vomiting (for nausea, vomiting).   • lidocaine-prilocaine (EMLA) 2.5-2.5 % Cream Apply to port one hour prior to access and cover with plastic wrap.   • albuterol (PROVENTIL) 2.5mg/3ml Nebu Soln solution for nebulization Take 3 mL by nebulization every four hours as needed for Shortness of Breath.   • albuterol 108 (90 Base) MCG/ACT Aero Soln inhalation aerosol Inhale 2 Puffs every 6 hours as needed for Shortness of Breath.   • potassium chloride ER (KLOR-CON) 10 MEQ tablet Take 1 tablet by mouth every day.   • cyanocobalamin (VITAMIN B-12) 500 MCG Tab Take 1,000 mcg by mouth every day.   • Multiple Vitamins-Minerals (CENTRUM SILVER PO) Take 1 Tab by mouth every day.   • Cholecalciferol (VITAMIN D3) 400 UNITS CAPS Take 1 Cap by mouth every day.   • loratadine (CLARITIN) 10 MG TABS Take 10 mg by mouth every day. Indications: Hayfever     Removed medications:   Medication Removal Reason   • [DISCONTINUED] sodium chloride (HYPER-SAL) 7 % Nebu Soln Duplicate - removed   • [DISCONTINUED] STIOLTO RESPIMAT 2.5-2.5 MCG/ACT Aero Soln Reconciled dosing, 2 puffs daily     "

## 2021-10-11 NOTE — ASSESSMENT & PLAN NOTE
Radiographic read reveals posttreatment changes in the right hepatic dome with a 10.1 cm multiloculated fluid collection at the site of prior ablation and infected collection is unlikely in the absence of internal pockets of air but can be considered in the appropriate clinical setting.  Patient does have sepsis and she also reports having a cough with sputum production and possibly has 2 sources for infection.  Sputum culture ordered and pending.  Consider IR consultation    Patient has history of rectal cancer metastasis to the liver status post partial hepatectomy and ablation.  Followed by oncology.  Continue antibiotics for SBO, perforation.  Monitor clinical course

## 2021-10-11 NOTE — DISCHARGE PLANNING
Care Transition Team Assessment        Anticipated Discharge Disposition: TBD, home with help, home health vs. SNF for short term stay.    Action: Patient was discussed at IDT rounds, she is a transfer from [facility] in [place], [place]. Patient lives with her spouse in a mobile home, daughter lives across the street. Daughter helps patient with ADL's. Patient is on chronic oxygen, [employer]. She uses a cane, has a 4-wheel walker. Daughter stated she is very unsteady, patient holds onto furniture when ambulating. Patient does not like to use the 4-wheel walker. Patient uses [facility] in [place], [place]. Patient has a left port, and ostomy..    Barriers to Discharge: Medical clearance, admitted with Small Bowel Obstruction.     Plan: HCM to follow for any discharge planning needs, family will transport back home to [place] when medically cleared.       Information Source  Orientation Level: Oriented X4  Information Given By: Relative  Informant's Name:  (daughter, Diana Gardner)  Who is responsible for making decisions for patient? : Patient    Readmission Evaluation  Is this a readmission?: Yes - unplanned readmission    Elopement Risk  Legal Hold: No  Ambulatory or Self Mobile in Wheelchair: No-Not an Elopement Risk         Discharge Preparedness  What is your plan after discharge?: Uncertain - pending medical team collaboration  What are your discharge supports?: Child, Spouse  Prior Functional Level: Needs Assist with Activities of Daily Living, Uses Cane, Uses Walker, Other (Comments) (Unsteady gait per family)  Difficulity with ADLs: Bathing, Dressing, Toileting, Walking  Difficulity with IADLs: Cooking, Driving, Laundry, Shopping    Functional Assesment  Prior Functional Level: Needs Assist with Activities of Daily Living, Uses Cane, Uses Walker, Other (Comments) (Unsteady gait per family)    Finances  Financial Barriers to Discharge: No  Prescription Coverage: Yes    Vision / Hearing Impairment  Right  Eye Vision: Impaired, Wears Glasses  Left Eye Vision: Impaired, Wears Glasses         Advance Directive  Advance Directive?: None  Advance Directive offered?: AD Booklet refused    Domestic Abuse  Have you ever been the victim of abuse or violence?: No         Discharge Risks or Barriers  Discharge risks or barriers?: No  Patient risk factors: Cognitive / sensory / physical deficit, Complex medical needs, Vulnerable adult    Anticipated Discharge Information  Discharge Disposition: Discharged to home/self care (01)  Discharge Address:  ([address] [place] [address])  Discharge Contact Phone Number:  ([phone])

## 2021-10-11 NOTE — PROGRESS NOTES
Patient admitted after midnight by Dr Soria, please see H&P for full details.    Pt seen and examined by myself. She was tearful and anxious on my assessment. Poor historian of her medical history. She said she is scared to have NGT placement and possibly surgery. Left chest port without erythema, swelling or tenderness.She reports pain on inspiratory, but says this is chronic and unchanged. Denies cough, SOB, wheezing.   No wheezing or rales. General abdominal tenderness, but no peritoneal signs. She reports no output from ostomy since Thursday.     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 --> No consolidation , infiltrates or edema on CXR. On 3.5 L O2, which is slightly above her baseline O2 requirements of 2.5L per patient   - WBC 29.3, afebrile --> Follow CBC and cultures   - CT showed SBO -- > NGT ordered.  NPO. Follow surgery recommendations  - Mild BRIONNA --> Continue IVFs and follow BMP   - Antiemetics and pain control PRN         Dispo: Continue inpatient     Patient was discussed with bedside RN/SW     BROWN Abbasi

## 2021-10-11 NOTE — CONSULTS
Trauma History and Physical  10/11/2021    Attending Physician: Camilo Faust MD.     CC: abdominal pain    HPI: Karlene Walters  is a very pleasant 81 y.o. female. She is awake alert and appropriate  She reports two day history abdominal distention pain, nausea and vomiting. Pain was generalized, cramping, Pain did not radiate  Associate no output ostomy. Extensive history of abdominal surgery as below She states no prior similair occurrence Ct scan as below concern small bowel obstruction  She states now feeling much better       Past Medical History:   Diagnosis Date   • Adverse effect of anesthesia     elevated BP postop   • Anesthesia    • Arrhythmia     occasional   • Blood clotting disorder (HCC) 08/2015    bilat PE    • Blood transfusion 1957   • Breath shortness     pt reports d/t chemo treatment; no O2; with moderate exertion; no c/o at this time   • Cancer (HCC) 09/2012    rectal cancer,  Liver CA-2015   • CATARACT     removed b/l   • Chemotherapy-induced neutropenia (HCC) 9/28/2016   • Chickenpox     history of   • Colon cancer (HCC)    • Dental disorder     dentures UPPERS AND LOWERS   • Elevated alkaline phosphatase level 11/15/2017   • Emphysema of lung (HCC)     COPD   • Fall    • [place] measles     history of   • Heart murmur     as a child   • Hemorrhagic disorder (HCC)     on Xarelto    • High cholesterol    • Hypercholesteremia    • Hypertension     no meds currently    • Ileostomy in place (HCC)    • Ileostomy in place (HCC)    • Indigestion    • Influenza     history of   • Lightheadedness 9/17/2015   • Mumps     history of   • Obstruction     ileostomy   • Other specified symptom associated with female genital organs     HYSTERECTOMY 1993   • Pneumonia 05/2017    tx'd with antibx   • Pulmonary embolism (HCC)    • 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)    • Renal insufficiency 3/14/2016   • Restless legs 5/6/2020   • Routine general medical examination at a health care facility  3/14/2016    3/14/16   • Seasonal allergies    • Swelling of both ankles     Lasix PRN   • Tonsillitis        Past Surgical History:   Procedure Laterality Date   • HEPATIC ABLATION  5/28/2020    Procedure: ABLATION, LESION, LIVER-TRISEGMENTECTOMY, MICROWAVE ABLATION, INTRA OPERATIVE ULTRA SOUND, SIMPLE RESECTED / REPAIR OF DIAPHRAGM;  Surgeon: Kit West M.D.;  Location: SURGERY [facility];  Service: General   • HEPATIC ABLATION LAPAROSCOPIC  11/15/2018    Procedure: HEPATIC ABLATION LAPAROSCOPIC.- SEGMENT 7/8;  Surgeon: Kit West M.D.;  Location: SURGERY [facility];  Service: General   • COLONOSCOPY WITH BIOPSY  8/23/2015    Procedure: COLONOSCOPY WITH BIOPSY;  Surgeon: Wilbur Glasgow M.D.;  Location: ENDOSCOPY [facility];  Service:    • HEPATIC ABLATION LAPAROSCOPIC  7/6/2015    Procedure: HEPATIC ABLATION LAPAROSCOPIC.;  Surgeon: Kit West M.D.;  Location: SURGERY [facility];  Service:    • CATH PLACEMENT Left 7/6/2015    Procedure: CATH PLACEMENT CEPHALIC POWERPORT;  Surgeon: Kit West M.D.;  Location: SURGERY [facility];  Service:    • FISTULA IN ANO REPAIR  1/28/2015    Performed by Kolton Montgomery M.D. at [facility]   • PERINEAL PROCEDURE  1/28/2015    Performed by Kolton Montgomery M.D. at [facility]   • FISTULA IN ANO REPAIR  10/15/2014    Performed by Kolton Montgomery M.D. at [facility]   • PERINEAL PROCEDURE  10/15/2014    Performed by Kolton Montgomery M.D. at [facility]   • IRRIGATION & DEBRIDEMENT GENERAL  2/19/2014    Performed by Kolton Montgomery M.D. at [facility]   • FLAP GRAFT  2/19/2014    Performed by Kolton Montgomery M.D. at [facility]   • PERINEAL PROCEDURE  12/18/2013    Performed by Kolton Montgomery M.D. at [facility]   • MYOCUTANEOUS FLAP  12/18/2013    Performed by Kolton Montgomery M.D. at [facility]   • IRRIGATION &  DEBRIDEMENT GENERAL  10/23/2013    Performed by Kolton Montgomery M.D. at SURGERY [facility]   • FISTULA IN ANO REPAIR  9/4/2013    Performed by Kolton Montgomery M.D. at SURGERY [facility]   • FLAP ROTATION  9/4/2013    Performed by Kolton Montgomery M.D. at SURGERY [facility]   • RECOVERY  8/26/2013    Performed by Cath-Recovery Surgery at SURGERY SAME DAY [facility]   • BIOPSY GENERAL  7/17/2013    Performed by Kolton Montgomery M.D. at SURGERY [facility]   • PROCTOSCOPY  7/17/2013    Performed by Kolton Montgomery M.D. at SURGERY [facility]   • FISTULA IN ANO REPAIR  2/27/2013    Performed by Kolton Montgomery M.D. at SURGERY [facility]   • SIGMOIDOSCOPY  2/27/2013    Performed by Kolton Montgomery M.D. at [facility]   • LAPAROSCOPY  10/8/2012    Performed by Kolton Montgomery M.D. at SURGERY [facility]   • ILEO LOOP DIVERSION  10/8/2012    Performed by Kolton Montgomery M.D. at SURGERY [facility]   • COLECTOMY  9/26/2012    Performed by Kolton Montgomery M.D. at SURGERY [facility]   • COLONOSCOPY FLEX W/ENDO US  9/20/2012    Performed by Harshil Bolton M.D. at SURGERY [facility]   • OTHER  2009    left eye torn retina repair   • CATARACT EXTRACTION WITH IOL  2004    bilateral   • ABDOMINAL HYSTERECTOMY TOTAL  1983   • CYST EXCISION  1972    ovarian   • COLON RESECTION     • EYE SURGERY      cataracts   • HYSTERECTOMY LAPAROSCOPY     • PB REMV 2ND CATARACT,CORN-SCLER SECTN     • TONSILLECTOMY         Current Facility-Administered Medications   Medication Dose Route Frequency Provider Last Rate Last Admin   • albuterol (PROVENTIL) 2.5 mg/0.5 mL nebulizer solution 2.5 mg  2.5 mg Nebulization Q4HRS PRN Lavell Soria, D.O.       • albuterol inhaler 2 Puff  2 Puff Inhalation Q6HRS PRN Lavell Soria, D.O.       • umeclidinium-vilanterol (ANORO ELLIPTA) inhaler 1 Puff  1 Puff Inhalation DAILY Lavellyumi Soria D.O.       • senna-docusate (PERICOLACE or SENOKOT S) 8.6-50 MG per tablet  2 Tablet  2 Tablet Oral BID Lvaell Soria D.O.        And   • polyethylene glycol/lytes (MIRALAX) PACKET 1 Packet  1 Packet Oral QDAY PRN Lavell Soria D.O.        And   • magnesium hydroxide (MILK OF MAGNESIA) suspension 30 mL  30 mL Oral QDAY PRN Lavell Soria D.O.        And   • bisacodyl (DULCOLAX) suppository 10 mg  10 mg Rectal QDAY PRN DARIN ParkO.       • lactated ringers infusion   Intravenous Continuous Lavell Soria D.O. 75 mL/hr at 10/11/21 0400 1,000 mL at 10/11/21 0400   • heparin injection 5,000 Units  5,000 Units Subcutaneous Q8HRS REX Park.OAngela   5,000 Units at 10/11/21 0633   • acetaminophen (Tylenol) tablet 650 mg  650 mg Oral Q6HRS PRN Lavell Soria D.O.       • Pharmacy Consult Request ...Pain Management Review 1 Each  1 Each Other PHARMACY TO DOSE REX Park.PASCUAL.       • oxyCODONE immediate-release (ROXICODONE) tablet 2.5 mg  2.5 mg Oral Q3HRS PRN DARIN ParkO.        Or   • oxyCODONE immediate-release (ROXICODONE) tablet 5 mg  5 mg Oral Q3HRS PRN REX Park.O.        Or   • morphine (pf) 4 mg/mL injection 2 mg  2 mg Intravenous Q3HRS PRN DARIN ParkO.       • enalaprilat (Vasotec) injection 1.25 mg 1 mL  1.25 mg Intravenous Q6HRS PRN REX Park.O.       • labetalol (NORMODYNE/TRANDATE) injection 10 mg  10 mg Intravenous Q4HRS PRN REX Park.O.       • ondansetron (ZOFRAN) syringe/vial injection 4 mg  4 mg Intravenous Q4HRS PRN DARIN ParkO.   4 mg at 10/11/21 0400   • ondansetron (ZOFRAN ODT) dispertab 4 mg  4 mg Oral Q4HRS PRN Lavell Soria, D.O.       • guaiFENesin dextromethorphan (ROBITUSSIN DM) 100-10 MG/5ML syrup 5 mL  5 mL Oral Q6HRS PRN Lavell Soria D.O.       • cefTRIAXone (Rocephin) 1 g in  mL IVPB  1 g Intravenous Q24HRS JULIAN AbbasiRAngelaN.   Stopped at 10/11/21 0946   • metroNIDAZOLE (FLAGYL) IVPB 500 mg  500 mg Intravenous Q8HRS Jeniffer GOODMAN  BROWN Ferro           Social History     Socioeconomic History   • Marital status:      Spouse name: Not on file   • Number of children: Not on file   • Years of education: Not on file   • Highest education level: Not on file   Occupational History   • Not on file   Tobacco Use   • Smoking status: Never Smoker   • Smokeless tobacco: Never Used   Vaping Use   • Vaping Use: Never used   Substance and Sexual Activity   • Alcohol use: No   • Drug use: No   • Sexual activity: Never     Partners: Male     Birth control/protection: Post-Menopausal   Other Topics Concern   • Primary/coprimary nurse & associates Not Asked   • Family contact information Not Asked   • OK to release patient information to the following Not Asked   • Patient preferred routine/Privacy concerns Not Asked   • Patient likes and dislikes Not Asked   • Participating In Research Study Not Asked   • Miscellaneous Not Asked   Social History Narrative   • Not on file     Social Determinants of Health     Financial Resource Strain:    • Difficulty of Paying Living Expenses:    Food Insecurity:    • Worried About Running Out of Food in the Last Year:    • Ran Out of Food in the Last Year:    Transportation Needs:    • Lack of Transportation (Medical):    • Lack of Transportation (Non-Medical):    Physical Activity:    • Days of Exercise per Week:    • Minutes of Exercise per Session:    Stress:    • Feeling of Stress :    Social Connections:    • Frequency of Communication with Friends and Family:    • Frequency of Social Gatherings with Friends and Family:    • Attends Hindu Services:    • Active Member of Clubs or Organizations:    • Attends Club or Organization Meetings:    • Marital Status:    Intimate Partner Violence:    • Fear of Current or Ex-Partner:    • Emotionally Abused:    • Physically Abused:    • Sexually Abused:        Family History   Problem Relation Age of Onset   • Heart Disease Mother    • Heart Failure Mother    •  "Hypertension Mother    • Hyperlipidemia Mother    • Cancer Mother    • Cancer Maternal Aunt 60        breast ca   • Lung Disease Brother         COPD/Emphysema/Smoker   • Cancer Brother         prostate cancer   • Alcohol/Drug Brother         Alcohol   • Kidney Disease Father         renal failure       Allergies:  Oxaliplatin, Amoxicillin, Codeine, Pcn [penicillins], Sulfa drugs, and Tape    Review of Systems:  Abdominal discomfort, pain no ostomy output review otherwise negative as above    Physical Exam:  /83   Pulse (!) 108   Temp 36.2 °C (97.1 °F) (Temporal)   Resp 18   Ht 1.651 m (5' 5\")   Wt 60.8 kg (134 lb)   SpO2 98%     Constitutional: Awake, alert, oriented x3. No acute distress. GCS 15. E4 V5 M6.  Head: No cephalohematoma.no bleeding ears nose or mouth ng in place  Neck: No tracheal deviation. No stridor  Cardiovascular: increased rate skin warm brisk cap refill  Pulmonary/Chest:breathing with ease no cough no distress   Abdominal: Soft, nondistended. Nontender to palpation. Ostomy healthy  Neurological: awake alert friendly cooperative   Skin: Skin is warm and dry.    Psychiatric:  Normal mood and affect.  Behavior is appropriate.       Labs:  Recent Labs     10/10/21  2238   WBC 29.3*   RBC 4.97   HEMOGLOBIN 13.8   HEMATOCRIT 43.8   MCV 88.1   MCH 27.8   MCHC 31.5*   RDW 62.8*   PLATELETCT 560*   MPV 9.0     Recent Labs     10/10/21  2238   SODIUM 130*   POTASSIUM 5.5   CHLORIDE 98   CO2 15*   GLUCOSE 209*   BUN 28*   CREATININE 1.50*   CALCIUM 10.5     Recent Labs     10/11/21  0322   INR 1.12     Recent Labs     10/10/21  2238 10/11/21  0322   ASTSGOT 26  --    ALTSGPT 21  --    TBILIRUBIN 0.5  --    ALKPHOSPHAT 264*  --    GLOBULIN 4.3*  --    INR  --  1.12       Radiology:  DX-ABDOMEN FOR TUBE PLACEMENT   Final Result      Enteric tube tip projects over the stomach      CT-ABDOMEN-PELVIS WITH   Final Result      1.  Small bowel obstruction, with transition point is in the pelvis, 20-30 " cm proximal to the ileostomy. This is most likely due to adhesions and less likely due to internal hernia.   2.  Posttreatment changes in the right hepatic dome, with a 10.1 cm multiloculated fluid collection at the site of prior ablation. Infected collection is unlikely in the absence of internal pockets of air, but can be considered in the appropriate    clinical settings.   3.  No definitive evidence of abdominopelvic metastatic disease. Evaluation for liver metastases is difficult on this single phase study.   4.  Bronchiectasis/scarring in the lower lobes, right worse than left.      DX-CHEST-PORTABLE (1 VIEW)   Final Result      Right basilar atelectasis. No focal consolidation. No effusions.            Assessment: This is a 81 y.o. 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

## 2021-10-11 NOTE — RESPIRATORY CARE
COPD EDUCATION by COPD CLINICAL EDUCATOR  (Phone: [phone])  10/11/2021 at 4:16 PM by Jannet Hendricks, RRT     Patient was interviewed by Respiratory Education team for COPD/Lung Disease Education. She see's [facility] for management of her Bronchiectasis and Chronic Respiratory Failure. PFT's noted below. Call placed to her hospital care team to initiate her home daily therapy as noted below (Nebulizer 7% nebulizer and Flutter for airway clearance) Her Pulmonary Doctor ordered a flutter but has yet to receive it. One was provided with instruction on use, therapy and cleaning.    at bedside during discussion/education. They live in [place], [place] and come to [facility] for her care.     COPD Screen  COPD Risk Screening  Do you have a history of COPD?: No  Do you have a Pulmonologist?: Yes ([facility])    COPD Assessment  COPD Clinical Specialists ONLY  COPD Education Initiated: Yes--Short Intervention (pleasant pt with mild RLD and Bronchiectasis. See's [facility]. Lives in [place])  DME Company: [employer]  DME Equipment Type: Oxygen Nebulizer  Pulmonologist Name: [place] -Dr Ventura Appt- 3/20/2022  Referrals Initiated: Yes  Home Health Care: Yes (plan HH vs SNF at discharge)  [facility] Outreach: N/A  [facility]: N/A  Is this a COPD exacerbation patient?: No (here for SBO non-Pulmonary)  $ Demo/Eval of SVN's, MDI's and Aerosols: Yes (cleaning,care and use of all respiratory equipment: nebulizer spacer)  (OP) Pulmonary Function Testing: Yes (FEV1 101 Ratio 73 slight Restr and known B'ectasis)    Meds to Beds  Would the patient like to opt in for Bedside Medication Delivery at Discharge?: Yes, interested     MY COPD ACTION PLAN     It is recommended that patients and physicians /healthcare providers complete this action plan together. This plan should be discussed at each physician visit and updated as needed.    The green, yellow and red zones show groups of symptoms of COPD. This list of  "symptoms is not comprehensive, and you may experience other symptoms. In the \"Actions\" column, your healthcare provider has recommended actions for you to take based on your symptoms.    Patient Name: Karlene Walters   YOB: 1940   Last Updated on:     Green Zone:  I am doing well today Actions   •  Usual activitiy and exercise level •  Take daily medications   •  Usual amounts of cough and phlegm/mucus •  Use oxygen as prescribed   •  Sleep well at night •  Continue regular exercise/diet plan   •  Appetite is good •  At all times avoid cigarette smoke, inhaled irritants     Daily Medications (these medications are taken every day):   Albuterol (Accuneb, Proair, Proventil, Ventolin) 3mL via nebulizer Twice daily     Additional Information:  Use this first then 7% solution in nebulizer followed by your Flutter device for airway clearance    Yellow Zone:  I am having a bad day or a COPD flare Actions   •  More breathless than usual •  Continue daily medications   •  I have less energy for my daily activities •  Use quick relief inhaler as ordered   •  Increased or thicker phlegm/mucus •  Use oxygen as prescribed   •  Using quick relief inhaler/nebulizer more often •  Get plenty of rest   •  Swelling of ankles more than usual •  Use pursed lip breathing   •  More coughing than usual •  At all times avoid cigarette smoke, inhaled irritants   •  I feel like I have a \"chest cold\"   •  Poor sleep and my symptoms woke me up   •  My appetite is not good   •  My medicine is not helping    •  Call provider immediately if symptoms don’t improve     Continue daily medications, add rescue medications:   Albuterol  Albuterol 3mL via nebulizer  2 Puffs Every 4 hours PRN  Every 6 hours PRN       Medications to be used during a flare up, (as Discussed with Provider):              Red Zone:  I need urgent medical care Actions   •  Severe shortness of breath even at rest •  Call 911 or seek medical care immediately   • "  Not able to do any activity because of breathing    •  Fever or shaking chills    •  Feeling confused or very drowsy     •  Chest pains    •  Coughing up blood

## 2021-10-11 NOTE — ASSESSMENT & PLAN NOTE
Resolved - tolerating PO with output from ileostomy  CT A/P demonstrated SBO  General Surgery  Following and will follow their recs

## 2021-10-11 NOTE — ASSESSMENT & PLAN NOTE
Resolved    This is Sepsis Present on admission  SIRS criteria identified on my evaluation include: Tachycardia, with heart rate greater than 90 BPM, Tachypnea, with respirations greater than 20 per minute and Leukocytosis, with WBC greater than 12,000  Source is intra-abdominal.  On exploratory laparotomy showed perforation  Sepsis protocol initiated  Fluid resuscitation ordered per protocol  IV antibiotics as appropriate for source of sepsis  While organ dysfunction may be noted elsewhere in this problem list or in the chart, degree of organ dysfunction does not meet CMS criteria for severe sepsis.      Procalcitonin ordered and minimally elevated 0.55.    Blood cultures x2, sputum culture for cough with sputum production.    Flagyl and ceftriaxone as IV antibiotic for 5-day course

## 2021-10-11 NOTE — H&P
Hospital Medicine History & Physical Note    Date of Service  10/11/2021    Primary Care Physician  Manan Quiñonez M.D.    Consultants  General Surgery: Dr. Baxter    Code Status  Full Code    Chief Complaint  Chief Complaint   Patient presents with   • Bowel Obstruction     pt transferred from OSH for small bowel obstruction. hx or colorectal cancer and ileostomy       History of Presenting Illness  Karlene Walters is a 81 y.o. female who presented 10/10/2021 transfer from outside facility for small bowel obstruction.  Patient states she has colorectal cancer and has been having multiple episodes of nausea and vomiting the past day.  She states she had approximately 4-5 episodes today and has only had one episode of nausea vomiting in the ED.  She ostomy in place without signs of infection.  She states she has been vaccinated for COVID-19 x2 and denies recent infection, anosmia, ageusia however does admit to shortness of breath, cough with green sputum production.  Patient states she is she was prompted to visit ED as she has had no output from her ostomy site but also denies any melena or hematochezia or blood from ostomy site.  She otherwise denies: Substernal chest pain, diaphoresis, anosmia, ageusia, fevers, chills, dysuria, hematuria, incoordination, vertigo, syncope, visual changes.  Patient has admitted to poor p.o. intake the past few days.    Vital signs at time presentation were as follows: 96.5, 110, 20, 110/77, 97% 2 L nasal cannula.    Chest x-ray performed and reveals right bibasilar atelectasis, no focal consolidations or effusions.  CT scan abdomen pelvis revealed small bowel obstruction with transition point in the pelvis 20 to 30 cm proximal to the ileostomy.  Most likely due to adhesions and less likely due to internal hernia.  Posttreatment changes in the right hepatic dome with a 10.1 cm multiloculated fluid collection at the site of prior ablation.  Patient is aware of this and  informed of this at time of presentation.  No definitive evidence of abdominal pelvic metastatic disease and patient states that she has upcoming three-phase liver test to be performed outpatient.  Bronchiectasis scarring in the lower lobes right worse than last is appreciated.    Labs were obtained on admission and revealed leukocytosis of 29.3, thrombocytosis of 560 with immature granulocytosis appreciated on CBC and otherwise relatively unremarkable.  Troponin T measured 17, lipase 54 and CMP reveals mild hyponatremia of 130, anion gap metabolic acidosis with CO2 of 15, anion gap 17, hyperglycemia with glucose 209, BRIONNA with BUN 28, creatinine 1.50 and elevation of ALP at 264.  Procalcitonin ordered and resulted minimally elevated 0.55.    IV antibiotics administered in the ED.  Hospitalist consulted for admission.  General surgery consulted and recommends NG tube.    Patient agrees to full CODE STATUS at time of evaluation.  She agrees to admission for SBO and is alert and oriented x4.  She will be optimized in ED and subsequently transferred to medical [facility] floor for further optimization of medical management.    I discussed the plan of care with patient.    Review of Systems  Review of Systems   Constitutional: Negative for chills and fever.   HENT: Negative for congestion and sore throat.    Eyes: Negative for blurred vision and double vision.   Respiratory: Positive for cough, sputum production and shortness of breath. Negative for wheezing.    Cardiovascular: Negative for chest pain and palpitations.   Gastrointestinal: Positive for abdominal pain, nausea and vomiting. Negative for blood in stool, constipation, diarrhea, heartburn and melena.   Genitourinary: Negative for dysuria and frequency.   Musculoskeletal: Negative for back pain and neck pain.   Skin: Negative for itching and rash.   Neurological: Negative for focal weakness, loss of consciousness, weakness and headaches.   Endo/Heme/Allergies:  Negative for environmental allergies. Does not bruise/bleed easily.   Psychiatric/Behavioral: Negative for depression. The patient does not have insomnia.        Past Medical History   has a past medical history of Adverse effect of anesthesia, Anesthesia, Arrhythmia, Blood clotting disorder (HCC) (08/2015), Blood transfusion (1957), Breath shortness, Cancer (HCC) (09/2012), CATARACT, Chemotherapy-induced neutropenia (HCC) (9/28/2016), Chickenpox, Colon cancer (HCC), Dental disorder, Elevated alkaline phosphatase level (11/15/2017), Emphysema of lung (HCC), Fall, [place] measles, Heart murmur, Hemorrhagic disorder (HCC), High cholesterol, Hypercholesteremia, Hypertension, Ileostomy in place (HCC), Ileostomy in place (HCC), Indigestion, Influenza, Lightheadedness (9/17/2015), Mumps, Obstruction, Other specified symptom associated with female genital organs, Pneumonia (05/2017), Pulmonary embolism (HCC), 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    Surgical History   has a past surgical history that includes tonsillectomy; abdominal hysterectomy total (1983); eye surgery; cyst excision (1972); colonoscopy flex w/endo us (9/20/2012); laparoscopy (10/8/2012); ileo loop diversion (10/8/2012); fistula in ano repair (2/27/2013); biopsy general (7/17/2013); recovery (8/26/2013); fistula in ano repair (9/4/2013); flap rotation (9/4/2013); irrigation & debridement general (10/23/2013); perineal procedure (12/18/2013); myocutaneous flap (12/18/2013); cataract extraction with iol (2004); irrigation & debridement general (2/19/2014); flap graft (2/19/2014); fistula in ano repair (10/15/2014); perineal procedure (10/15/2014); fistula in ano repair (1/28/2015); perineal procedure (1/28/2015); hepatic ablation laparoscopic  "(7/6/2015); cath placement (Left, 7/6/2015); colonoscopy with biopsy (8/23/2015); pr remv 2nd cataract,corn-scler sectn; hysterectomy laparoscopy; colectomy (9/26/2012); sigmoidoscopy (2/27/2013); proctoscopy (7/17/2013); colon resection; other (2009); hepatic ablation laparoscopic (11/15/2018); and hepatic ablation (5/28/2020).     Family History  family history includes Alcohol/Drug in her brother; Cancer in her brother and mother; Cancer (age of onset: 60) in her maternal aunt; Heart Disease in her mother; Heart Failure in her mother; Hyperlipidemia in her mother; Hypertension in her mother; Kidney Disease in her father; Lung Disease in her brother.   Family history reviewed with patient. There is no family history that is pertinent to the chief complaint.     Social History   reports that she has never smoked. She has never used smokeless tobacco. She reports that she does not drink alcohol and does not use drugs.    Allergies  Allergies   Allergen Reactions   • Oxaliplatin Anaphylaxis   • Amoxicillin Rash     Rash all over body   • Codeine      \"gets drunk\"   • Pcn [Penicillins] Itching     itching   • Sulfa Drugs Itching     itching   • Tape Rash     PAPER TAPE OK       Medications  Prior to Admission Medications   Prescriptions Last Dose Informant Patient Reported? Taking?   Cholecalciferol (VITAMIN D3) 400 UNITS CAPS 10/10/2021 at am Patient Yes No   Sig: Take 1 Cap by mouth every day.   Multiple Vitamins-Minerals (CENTRUM SILVER PO) 10/10/2021 at am Patient Yes No   Sig: Take 1 Tab by mouth every day.   Tiotropium Bromide-Olodaterol (STIOLTO RESPIMAT) 2.5-2.5 MCG/ACT Aero Soln 10/10/2021 at am  Yes Yes   Sig: Inhale 2 Puffs every day.   albuterol (PROVENTIL) 2.5mg/3ml Nebu Soln solution for nebulization prn at prn Patient No No   Sig: Take 3 mL by nebulization every four hours as needed for Shortness of Breath.   albuterol 108 (90 Base) MCG/ACT Aero Soln inhalation aerosol prn at prn Patient No No   Sig: " Inhale 2 Puffs every 6 hours as needed for Shortness of Breath.   cyanocobalamin (VITAMIN B-12) 500 MCG Tab 10/10/2021 at am Patient Yes No   Sig: Take 1,000 mcg by mouth every day.   lidocaine-prilocaine (EMLA) 2.5-2.5 % Cream prn at prn Patient No No   Sig: Apply to port one hour prior to access and cover with plastic wrap.   loratadine (CLARITIN) 10 MG TABS 10/10/2021 at am Patient Yes No   Sig: Take 10 mg by mouth every day. Indications: Hayfever   ondansetron (ZOFRAN) 4 MG Tab tablet 10/10/2021 at am Patient No No   Sig: Take 1 tablet by mouth every four hours as needed for Nausea/Vomiting (for nausea, vomiting).   potassium chloride ER (KLOR-CON) 10 MEQ tablet 10/10/2021 at am Patient No No   Sig: Take 1 tablet by mouth every day.   rivaroxaban (XARELTO) 10 MG Tab tablet 10/9/2021 at pm  Yes Yes   Sig: Take 10 mg by mouth with dinner. Indications: Blockage of Blood Vessel to Lung by a Particle   sodium chloride (HYPER-SAL) 7 % Nebu Soln 10/10/2021 at Unknown time  No No   Sig: Take 4 mL by nebulization every day.      Facility-Administered Medications: None       Physical Exam  Temp:  [35.8 °C (96.5 °F)] 35.8 °C (96.5 °F)  Pulse:  [109-117] 109  Resp:  [20-26] 26  BP: (110-136)/(76-89) 136/77  SpO2:  [96 %-97 %] 97 %  Blood Pressure : 136/77   Temperature: 35.8 °C (96.5 °F)   Pulse: (!) 109   Respiration: (!) 26   Pulse Oximetry: 97 %       Physical Exam  Constitutional:       Appearance: Normal appearance.      Comments: Pleasant cooperative, alert and oriented x4, malnourished/cachectic appearance, no acute distress.   HENT:      Head: Normocephalic and atraumatic.      Mouth/Throat:      Mouth: Mucous membranes are dry.      Pharynx: Oropharynx is clear. No posterior oropharyngeal erythema.      Comments: Dentures appreciated  Eyes:      General: No scleral icterus.     Extraocular Movements: Extraocular movements intact.      Conjunctiva/sclera: Conjunctivae normal.      Pupils: Pupils are equal, round,  and reactive to light.   Neck:      Vascular: No carotid bruit.   Cardiovascular:      Rate and Rhythm: Normal rate and regular rhythm.      Pulses: Normal pulses.      Heart sounds: Normal heart sounds. No murmur heard.   No friction rub. No gallop.    Pulmonary:      Effort: Pulmonary effort is normal.      Breath sounds: Rales present. No wheezing or rhonchi.   Chest:      Comments: Left-sided Port-A-Cath present without signs of infection  Abdominal:      General: Abdomen is flat. Bowel sounds are normal. There is no distension.      Palpations: There is no mass.      Tenderness: There is abdominal tenderness. There is no rebound.      Comments: Ostomy site present without signs of infection   Musculoskeletal:         General: No swelling. Normal range of motion.      Cervical back: Normal range of motion.      Right lower leg: No edema.      Left lower leg: No edema.   Lymphadenopathy:      Cervical: No cervical adenopathy.   Skin:     General: Skin is warm and dry.      Capillary Refill: Capillary refill takes less than 2 seconds.      Findings: No erythema or rash.   Neurological:      General: No focal deficit present.      Mental Status: She is alert and oriented to person, place, and time. Mental status is at baseline.      Cranial Nerves: No cranial nerve deficit.      Sensory: No sensory deficit.      Motor: No weakness.   Psychiatric:         Mood and Affect: Mood normal.         Behavior: Behavior normal.         Laboratory:  Recent Labs     10/10/21  2238   WBC 29.3*   RBC 4.97   HEMOGLOBIN 13.8   HEMATOCRIT 43.8   MCV 88.1   MCH 27.8   MCHC 31.5*   RDW 62.8*   PLATELETCT 560*   MPV 9.0     Recent Labs     10/10/21  2238   SODIUM 130*   POTASSIUM 5.5   CHLORIDE 98   CO2 15*   GLUCOSE 209*   BUN 28*   CREATININE 1.50*   CALCIUM 10.5     Recent Labs     10/10/21  2238   ALTSGPT 21   ASTSGOT 26   ALKPHOSPHAT 264*   TBILIRUBIN 0.5   LIPASE 54   GLUCOSE 209*         No results for input(s): NTPROBNP in the  last 72 hours.      Recent Labs     10/10/21  2238   TROPONINT 17     I reviewed and interpreted the above twelve-lead EKG and do appreciate sinus tachycardia with normal QTC, good R wave progression in precordial leads.  Left anterior fascicular block appreciated.  T wave inversions aVL and no ST segment changes.    Imaging:  CT-ABDOMEN-PELVIS WITH   Final Result      1.  Small bowel obstruction, with transition point is in the pelvis, 20-30 cm proximal to the ileostomy. This is most likely due to adhesions and less likely due to internal hernia.   2.  Posttreatment changes in the right hepatic dome, with a 10.1 cm multiloculated fluid collection at the site of prior ablation. Infected collection is unlikely in the absence of internal pockets of air, but can be considered in the appropriate    clinical settings.   3.  No definitive evidence of abdominopelvic metastatic disease. Evaluation for liver metastases is difficult on this single phase study.   4.  Bronchiectasis/scarring in the lower lobes, right worse than left.      DX-CHEST-PORTABLE (1 VIEW)   Final Result      Right basilar atelectasis. No focal consolidation. No effusions.          I reviewed and interpreted the above CT abdomen pelvis and do appreciate small bowel obstruction in the pelvis as seen above      I reviewed and interpreted the above CT abdomen pelvis and do appreciate 10.1 cm right hepatic dome multiloculated fluid collection as seen above.      Assessment/Plan:  I anticipate this patient will require at least two midnights for appropriate medical management, necessitating inpatient admission.    * Sepsis (HCC)- (present on admission)  Assessment & Plan  This is Sepsis Present on admission  SIRS criteria identified on my evaluation include: Tachycardia, with heart rate greater than 90 BPM, Tachypnea, with respirations greater than 20 per minute and Leukocytosis, with WBC greater than 12,000  Source is intra-abdominal  Sepsis protocol  initiated  Fluid resuscitation ordered per protocol  IV antibiotics as appropriate for source of sepsis  While organ dysfunction may be noted elsewhere in this problem list or in the chart, degree of organ dysfunction does not meet CMS criteria for severe sepsis.  Procalcitonin ordered and minimally elevated 0.55.  Blood cultures x2, sputum culture for cough with sputum production.  Flagyl as IV antibiotic.          Metabolic acidosis, increased anion gap (IAG)- (present on admission)  Assessment & Plan  Likely secondary lactic acidosis from SBO  Trend lactic acid  Lactated Ringer infusion at 75 cc/h with cut off  Repeat CMP in a.m.  Consider bicarbonate infusion if CO2 does not increase on repeat CMP    Small bowel obstruction (HCC)- (present on admission)  Assessment & Plan  Supportive care  Lactated Ringer infusion 75 cc/h with cut off  Advance diet as tolerated however n.p.o. for active nausea and vomiting  General surgery consulted and recommends NG tube placement and evaluation by colleague in a.m.  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  Patient does have sepsis and she also reports having a cough with sputum production and possibly has 2 sources for infection.  Sputum culture ordered and pending.  Consider IR consultation    Cough with sputum- (present on admission)  Assessment & Plan  Pep therapy  Chest x-ray reveals basilar atelectasis  Robitussin-DM as needed  Sputum culture ordered and pending      Hyponatremia- (present on admission)  Assessment  & Plan  Mild at level of 130, asymptomatic  Likely hypovolemic hyponatremia in lactated Ringer infusion initiated  Labs in a.m.      BRIONNA (acute kidney injury) (HCC)- (present on admission)  Assessment & Plan  Lactated Ringer infusion at 75 cc/h  Urinalysis/urine sodium for fractional excretion of Na  Likely prerenal azotemia secondary to dehydration  Avoid nephrotoxic agents  Discontinued Xarelto and initiate heparin secondary to creatinine clearance  Consider renal ultrasound      VTE prophylaxis: heparin ppx

## 2021-10-11 NOTE — PROGRESS NOTES
81 year old female with history of metastatic rectal adenocarcinoma presenting from [place] with a small bowel obstruction. No peritonitis per report of Dr. Paige. Leukocytosis present but afebrile. Recommended NGT placement for trial of conservative therapy. Dr. Faust will place a full consult note in the morning.

## 2021-10-11 NOTE — ED PROVIDER NOTES
"ED Provider Note    CHIEF COMPLAINT  Chief Complaint   Patient presents with   • Bowel Obstruction     pt transferred from OSH for small bowel obstruction. hx or colorectal cancer and ileostomy        HPI    Primary care provider: Manan Quiñonez M.D.   History obtained from: Patient and record from outside hospital  History limited by: None     Karlene Walters is a 81 y.o. female who presents to the ED by EMS as a transfer from [facility] in [place] for possible bowel obstruction.  Patient with history of metastatic rectal adenocarcinoma status post ileostomy and chemotherapy who started having increasing abdominal pain over the past 24 hours or so.  She called her oncologist who told her to come to this ED.  As she was getting in her car, she felt lightheaded and dizzy and passed out for \"a minute\" which was witnessed by her son who was planning on driving her to this ED.  Patient reports there was no injury or trauma.  She was then taken to the outside ED where she declined any tests and was subsequently transferred here for further care.  She denies recent fever.  She has had some cough and reports some shortness of breath.  She reports that most of her pain is across her upper abdomen and down along both sides and describes the pain as \"horrible.\"  She reports 4 episodes of nausea and vomiting today without gross blood noted.  She states that she has not had any output in her ileostomy bag since around noon today.  She denies dysuria but has had decreased urine output.  She has not noticed any rash.    REVIEW OF SYSTEMS  Please see HPI for pertinent positives/negatives.  All other systems reviewed and are negative.     PAST MEDICAL HISTORY  Past Medical History:   Diagnosis Date   • Restless legs 5/6/2020   • Elevated alkaline phosphatase level 11/15/2017   • Pneumonia 05/2017    tx'd with antibx   • Chemotherapy-induced neutropenia (HCC) 9/28/2016   • Routine general medical examination " at a health care facility 3/14/2016    3/14/16   • Renal insufficiency 3/14/2016   • Lightheadedness 9/17/2015   • Blood clotting disorder (HCC) 08/2015    bilat PE    • Cancer (HCC) 09/2012    rectal cancer,  Liver CA-2015   • Blood transfusion 1957   • Adverse effect of anesthesia     elevated BP postop   • Anesthesia    • Arrhythmia     occasional   • Breath shortness     pt reports d/t chemo treatment; no O2; with moderate exertion; no c/o at this time   • CATARACT     removed b/l   • Chickenpox     history of   • Colon cancer (HCC)    • Dental disorder     dentures UPPERS AND LOWERS   • Emphysema of lung (HCC)     COPD   • Fall    • Bulgarian measles     history of   • Heart murmur     as a child   • Hemorrhagic disorder (HCC)     on Xarelto    • High cholesterol    • Hypercholesteremia    • Hypertension     no meds currently    • Ileostomy in place (HCC)    • Ileostomy in place (HCC)    • Indigestion    • Influenza     history of   • Mumps     history of   • Obstruction     ileostomy   • Other specified symptom associated with female genital organs     HYSTERECTOMY 1993   • Pulmonary embolism (HCC)    • 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)    • Seasonal allergies    • Swelling of both ankles     Lasix PRN   • Tonsillitis         SURGICAL HISTORY  Past Surgical History:   Procedure Laterality Date   • HEPATIC ABLATION  5/28/2020    Procedure: ABLATION, LESION, LIVER-TRISEGMENTECTOMY, MICROWAVE ABLATION, INTRA OPERATIVE ULTRA SOUND, SIMPLE RESECTED / REPAIR OF DIAPHRAGM;  Surgeon: Kit West M.D.;  Location: SURGERY [facility];  Service: General   • HEPATIC ABLATION LAPAROSCOPIC  11/15/2018    Procedure: HEPATIC ABLATION LAPAROSCOPIC.- SEGMENT 7/8;  Surgeon: Kit West M.D.;  Location: SURGERY [facility];  Service: General   • COLONOSCOPY WITH BIOPSY  8/23/2015    Procedure: COLONOSCOPY WITH BIOPSY;  Surgeon: Wilbur Glasgow M.D.;  Location: ENDOSCOPY [facility]  ORS;  Service:    • HEPATIC ABLATION LAPAROSCOPIC  7/6/2015    Procedure: HEPATIC ABLATION LAPAROSCOPIC.;  Surgeon: Kit West M.D.;  Location: SURGERY [facility];  Service:    • CATH PLACEMENT Left 7/6/2015    Procedure: CATH PLACEMENT CEPHALIC POWERPORT;  Surgeon: Kit West M.D.;  Location: SURGERY [facility];  Service:    • FISTULA IN ANO REPAIR  1/28/2015    Performed by Kolton Montgomery M.D. at SURGERY [facility]   • PERINEAL PROCEDURE  1/28/2015    Performed by Kolton Montgomery M.D. at SURGERY [facility]   • FISTULA IN ANO REPAIR  10/15/2014    Performed by Kolton Montgomery M.D. at SURGERY [facility]   • PERINEAL PROCEDURE  10/15/2014    Performed by Kolton Montgomery M.D. at [facility]   • IRRIGATION & DEBRIDEMENT GENERAL  2/19/2014    Performed by Kotlon Montgomery M.D. at SURGERY [facility]   • FLAP GRAFT  2/19/2014    Performed by Kolton Montgomery M.D. at SURGERY [facility]   • PERINEAL PROCEDURE  12/18/2013    Performed by Kolton Montgomery M.D. at SURGERY [facility]   • MYOCUTANEOUS FLAP  12/18/2013    Performed by Kolton Montgomery M.D. at [facility]   • IRRIGATION & DEBRIDEMENT GENERAL  10/23/2013    Performed by Kolton Montgomery M.D. at SURGERY [facility]   • FISTULA IN ANO REPAIR  9/4/2013    Performed by Kolton Montgomery M.D. at [facility]   • FLAP ROTATION  9/4/2013    Performed by Kolton Montgomery M.D. at SURGERY [facility]   • RECOVERY  8/26/2013    Performed by Cath-Recovery Surgery at SURGERY SAME DAY [facility]   • BIOPSY GENERAL  7/17/2013    Performed by Kolton Montgomery M.D. at [facility]   • PROCTOSCOPY  7/17/2013    Performed by Kolton Montgomery M.D. at SURGERY [facility]   • FISTULA IN ANO REPAIR  2/27/2013    Performed by Kolton Montgomery M.D. at [facility] ORS   • SIGMOIDOSCOPY  2/27/2013    Performed by Kolton Montgomery M.D. at SURGERY [facility]   • LAPAROSCOPY  10/8/2012     Performed by Kolton Montgomery M.D. at SURGERY [facility]   • ILEO LOOP DIVERSION  10/8/2012    Performed by Kolton Montgomery M.D. at SURGERY [facility]   • COLECTOMY  9/26/2012    Performed by Kolton Montgomery M.D. at SURGERY [facility] ORS   • COLONOSCOPY FLEX W/ENDO US  9/20/2012    Performed by Harshil Bolton M.D. at SURGERY [facility]   • OTHER  2009    left eye torn retina repair   • CATARACT EXTRACTION WITH IOL  2004    bilateral   • ABDOMINAL HYSTERECTOMY TOTAL  1983   • CYST EXCISION  1972    ovarian   • COLON RESECTION     • EYE SURGERY      cataracts   • HYSTERECTOMY LAPAROSCOPY     • PB REMV 2ND CATARACT,CORN-SCLER SECTN     • TONSILLECTOMY          SOCIAL HISTORY  Social History     Tobacco Use   • Smoking status: Never Smoker   • Smokeless tobacco: Never Used   Vaping Use   • Vaping Use: Never used   Substance and Sexual Activity   • Alcohol use: No   • Drug use: No   • Sexual activity: Never     Partners: Male     Birth control/protection: Post-Menopausal        FAMILY HISTORY  Family History   Problem Relation Age of Onset   • Heart Disease Mother    • Heart Failure Mother    • Hypertension Mother    • Hyperlipidemia Mother    • Cancer Mother    • Cancer Maternal Aunt 60        breast ca   • Lung Disease Brother         COPD/Emphysema/Smoker   • Cancer Brother         prostate cancer   • Alcohol/Drug Brother         Alcohol   • Kidney Disease Father         renal failure        CURRENT MEDICATIONS  Home Medications     Reviewed by Berry Steele PhT (Pharmacy Tech) on 10/11/21 at 0121  Med List Status: Complete   Medication Last Dose Status   albuterol (PROVENTIL) 2.5mg/3ml Nebu Soln solution for nebulization prn Active   albuterol 108 (90 Base) MCG/ACT Aero Soln inhalation aerosol prn Active   Cholecalciferol (VITAMIN D3) 400 UNITS CAPS 10/10/2021 Active   cyanocobalamin (VITAMIN B-12) 500 MCG Tab 10/10/2021 Active   lidocaine-prilocaine (EMLA) 2.5-2.5 % Cream prn Active   loratadine  "(CLARITIN) 10 MG TABS 10/10/2021 Active   Multiple Vitamins-Minerals (CENTRUM SILVER PO) 10/10/2021 Active   ondansetron (ZOFRAN) 4 MG Tab tablet 10/10/2021 Active   potassium chloride ER (KLOR-CON) 10 MEQ tablet 10/10/2021 Active   rivaroxaban (XARELTO) 10 MG Tab tablet 10/9/2021 Active   sodium chloride (HYPER-SAL) 7 % Nebu Soln 10/10/2021 Active   Tiotropium Bromide-Olodaterol (STIOLTO RESPIMAT) 2.5-2.5 MCG/ACT Aero Soln 10/10/2021 Active                 ALLERGIES  Allergies   Allergen Reactions   • Oxaliplatin Anaphylaxis   • Amoxicillin Rash     Rash all over body   • Codeine      \"gets drunk\"   • Pcn [Penicillins] Itching     itching   • Sulfa Drugs Itching     itching   • Tape Rash     PAPER TAPE OK        PHYSICAL EXAM  VITAL SIGNS: /77   Pulse (!) 109   Temp 35.8 °C (96.5 °F) (Temporal)   Resp (!) 26   Ht 1.651 m (5' 5\")   Wt 60.8 kg (134 lb)   LMP  (LMP Unknown)   SpO2 97%   BMI 22.30 kg/m²  @MALINI[[id number]::@     Pulse ox interpretation: 97% I interpret this pulse ox as normal     Cardiac monitor interpretation: Sinus tachycardia with heart rate in the 110s as interpreted by me.  The patient presented with syncope and tachycardia and cardiac monitor was ordered to monitor for dysrhythmia.    Constitutional: Thin patient, alert in no apparent distress, nontoxic appearance    HENT: No external signs of trauma, normocephalic, mask on due to COVID-19 pandemic  Eyes: PERRL, conjunctiva without erythema, no discharge, no icterus    Neck: Soft and supple, trachea midline, no stridor, no tenderness, no LAD, no JVD, good ROM    Cardiovascular: Tachycardia, no murmurs/rubs/gallops, strong distal pulses and good perfusion    Thorax & Lungs: No respiratory distress, CTAB   Abdomen: Soft, ileostomy in place without stool in the bag, mild diffuse tenderness to palpation, nondistended, no guarding, no rebound, diminished BS    Back: No CVAT    Extremities: No cyanosis, no edema, no gross deformity, good ROM, " no tenderness, intact distal pulses with brisk cap refill    Skin: Warm, dry, no pallor/cyanosis, no rash noted    Lymphatic: No lymphadenopathy noted    Neuro: A/O times 3, no focal deficits noted    Psychiatric: Cooperative, slightly anxious      DIAGNOSTIC STUDIES / PROCEDURES    EKG  12 Lead EKG obtained at 2257 and interpreted by me:   Rate: 116   Rhythm: Sinus tachycardia  Ectopy: None  Intervals: Normal   Axis: LAD  QRS: Late precordial R wave transition  ST segments: Normal  T Waves: Normal    Clinical Impression: Sinus tachycardia without acute ischemic changes or other dysrhythmia  Compared to July 19, 2021 without significant change      LABS  All labs reviewed by me.     Results for orders placed or performed during the hospital encounter of 10/10/21   CBC WITH DIFFERENTIAL   Result Value Ref Range    WBC 29.3 (H) 4.8 - 10.8 K/uL    RBC 4.97 4.20 - 5.40 M/uL    Hemoglobin 13.8 12.0 - 16.0 g/dL    Hematocrit 43.8 37.0 - 47.0 %    MCV 88.1 81.4 - 97.8 fL    MCH 27.8 27.0 - 33.0 pg    MCHC 31.5 (L) 33.6 - 35.0 g/dL    RDW 62.8 (H) 35.9 - 50.0 fL    Platelet Count 560 (H) 164 - 446 K/uL    MPV 9.0 9.0 - 12.9 fL    Neutrophils-Polys 92.60 (H) 44.00 - 72.00 %    Lymphocytes 2.40 (L) 22.00 - 41.00 %    Monocytes 3.30 0.00 - 13.40 %    Eosinophils 0.00 0.00 - 6.90 %    Basophils 0.40 0.00 - 1.80 %    Immature Granulocytes 1.30 (H) 0.00 - 0.90 %    Nucleated RBC 0.00 /100 WBC    Neutrophils (Absolute) 27.12 (H) 2.00 - 7.15 K/uL    Lymphs (Absolute) 0.69 (L) 1.00 - 4.80 K/uL    Monos (Absolute) 0.96 (H) 0.00 - 0.85 K/uL    Eos (Absolute) 0.01 0.00 - 0.51 K/uL    Baso (Absolute) 0.11 0.00 - 0.12 K/uL    Immature Granulocytes (abs) 0.38 (H) 0.00 - 0.11 K/uL    NRBC (Absolute) 0.00 K/uL   COMP METABOLIC PANEL   Result Value Ref Range    Sodium 130 (L) 135 - 145 mmol/L    Potassium 5.5 3.6 - 5.5 mmol/L    Chloride 98 96 - 112 mmol/L    Co2 15 (L) 20 - 33 mmol/L    Anion Gap 17.0 (H) 7.0 - 16.0    Glucose 209 (H) 65 -  99 mg/dL    Bun 28 (H) 8 - 22 mg/dL    Creatinine 1.50 (H) 0.50 - 1.40 mg/dL    Calcium 10.5 8.5 - 10.5 mg/dL    AST(SGOT) 26 12 - 45 U/L    ALT(SGPT) 21 2 - 50 U/L    Alkaline Phosphatase 264 (H) 30 - 99 U/L    Total Bilirubin 0.5 0.1 - 1.5 mg/dL    Albumin 3.5 3.2 - 4.9 g/dL    Total Protein 7.8 6.0 - 8.2 g/dL    Globulin 4.3 (H) 1.9 - 3.5 g/dL    A-G Ratio 0.8 g/dL   LIPASE   Result Value Ref Range    Lipase 54 11 - 82 U/L   TROPONIN   Result Value Ref Range    Troponin T 17 6 - 19 ng/L   LACTIC ACID   Result Value Ref Range    Lactic Acid 2.7 (H) 0.5 - 2.0 mmol/L   PROCALCITONIN   Result Value Ref Range    Procalcitonin 0.55 (H) <0.25 ng/mL   ESTIMATED GFR   Result Value Ref Range    GFR If  40 (A) >60 mL/min/1.73 m 2    GFR If Non  33 (A) >60 mL/min/1.73 m 2   EKG (NOW)   Result Value Ref Range    Report       [facility]    Test Date:  2021-10-10  Pt Name:    KANG HALL                  Department: ER  MRN:        [id number]                      Room:        14  Gender:     Female                       Technician: NITA  :        [id number]                   Requested By:LAZARO HONG  Order #:    [id number]                    Reading MD:    Measurements  Intervals                                Axis  Rate:       116                          P:          71  AK:         168                          QRS:        -89  QRSD:       80                           T:          77  QT:         300  QTc:        417    Interpretive Statements  SINUS TACHYCARDIA  BIATRIAL ABNORMALITIES  LEFT ANTERIOR FASCICULAR BLOCK  LATE PRECORDIAL R/S TRANSITION  PROBABLE LEFT VENTRICULAR HYPERTROPHY  BASELINE WANDER IN LEAD(S) V6  Compared to ECG 2021 22:21:01  Atrial abnormality now present  Right ventricular hypertrophy no longer present       *Note: Due to a large number of results and/or encounters for the requested time period, some results have not been  displayed. A complete set of results can be found in Results Review.        RADIOLOGY  The radiologist's interpretation of all radiological studies have been reviewed by me.     CT-ABDOMEN-PELVIS WITH   Final Result      1.  Small bowel obstruction, with transition point is in the pelvis, 20-30 cm proximal to the ileostomy. This is most likely due to adhesions and less likely due to internal hernia.   2.  Posttreatment changes in the right hepatic dome, with a 10.1 cm multiloculated fluid collection at the site of prior ablation. Infected collection is unlikely in the absence of internal pockets of air, but can be considered in the appropriate    clinical settings.   3.  No definitive evidence of abdominopelvic metastatic disease. Evaluation for liver metastases is difficult on this single phase study.   4.  Bronchiectasis/scarring in the lower lobes, right worse than left.      DX-CHEST-PORTABLE (1 VIEW)   Final Result      Right basilar atelectasis. No focal consolidation. No effusions.             COURSE & MEDICAL DECISION MAKING  Nursing notes, VS, PMSFHx reviewed in chart.     Review of past medical records shows the patient has had outpatient visits with oncology.  She was last admitted to this hospital July 19, 2021 for weakness and cough and discharged on July 23, 2021.      Differential diagnoses considered include but are not limited to: Appendicitis, AMI, AAA, bowel perforation/obstruction, ileus, cholecystitis/ascending cholangitis, gallstone/biliary colic, mesenteric ischemia, pancreatitis, PUD, gastritis, GERD, KS/renal colic, colitis, constipation, muscle strain, hernia,   syncope, hypoglycemia, vasovagal episode, dysrhythmia, dehydration, electrolyte abnormality, anemia      0205: D/W Dr. Baxter, on-call surgeon.  She recommends medicine admission and placement of NG tube and ordering small bowel with follow-through.  She will see the patient in the morning.      0213: D/W hospitalist who will  admit patient      History and physical exam as above.  Patient presents complaining of symptoms similar to prior small bowel obstruction which was confirmed on CT as above.  She received IV fluid, Compazine and morphine in the ED.  A syncope work-up was also initiated.  EKG showed sinus tachycardia without acute ischemic changes or other dysrhythmia or significant change compared to prior.  Chest x-ray with findings as above.  Labs were significant for leukocytosis, mildly elevated lactic acid and procalcitonin mild hyponatremia, anion gap acidosis, hyperglycemia and findings of BRIONNA.  Findings discussed with Dr. Baxter with above recommendations.  Findings and plan discussed with the patient and she is agreeable.  Discussed with the hospitalist who graciously agreed to my patient for further care.      HYDRATION: Based on the patient's presentation of Acute Kindney Injury, Acute Vomiting, Hyperglycemia and Tachycardia the patient was given IV fluids. IV Hydration was used because oral hydration was not as rapid as required. Upon recheck following hydration, the patient was iimproving.      FINAL IMPRESSION  1. SBO (small bowel obstruction) (HCC) Active   2. Generalized abdominal pain Acute   3. Non-intractable vomiting with nausea, unspecified vomiting type Acute   4. Leukocytosis, unspecified type Active   5. Increased anion gap metabolic acidosis Active   6. Hyperglycemia Active   7. BRIONNA (acute kidney injury) (HCC) Active   8. Rectal adenocarcinoma (HCC) Chronic   9. Syncope, unspecified syncope type Acute          DISPOSITION  Patient will be admitted by hospitalist in guarded condition      Electronically signed by: Esteban Paige D.O., 10/10/2021 10:19 PM      Portions of this record were made with voice recognition software.  Despite my review, spelling/grammar/context errors may still remain.  Interpretation of this chart should be taken in this context.

## 2021-10-11 NOTE — PROGRESS NOTES
4 Eyes Skin Assessment Completed by AZEB May and AZEB Abbasi.    Head WDL  Ears WDL  Nose WDL  Mouth WDL  Neck WDL  Breast/Chest WDL  Shoulder Blades WDL  Spine WDL  (R) Arm/Elbow/Hand WDL  (L) Arm/Elbow/Hand WDL  Abdomen WDL  Groin Redness and Swelling  Scrotum/Coccyx/Buttocks Redness, Blanching, Excoriation and Moisture Fissure  (R) Leg WDL  (L) Leg WDL  (R) Heel/Foot/Toe WDL  (L) Heel/Foot/Toe WDL          Devices In Places Central Line and Nasal Cannula      Interventions In Place N/A    Possible Skin Injury Yes    Pictures Uploaded Into Epic Yes  Wound Consult Placed Yes  RN Wound Prevention Protocol Ordered Yes

## 2021-10-12 ENCOUNTER — OUTPATIENT INFUSION SERVICES (OUTPATIENT)
Dept: ONCOLOGY | Facility: MEDICAL CENTER | Age: 81
End: 2021-10-12
Attending: INTERNAL MEDICINE
Payer: MEDICARE

## 2021-10-12 LAB
ALBUMIN SERPL BCP-MCNC: 3.4 G/DL (ref 3.2–4.9)
ALBUMIN/GLOB SERPL: 0.9 G/DL
ALP SERPL-CCNC: 197 U/L (ref 30–99)
ALT SERPL-CCNC: 15 U/L (ref 2–50)
ANION GAP SERPL CALC-SCNC: 16 MMOL/L (ref 7–16)
AST SERPL-CCNC: 17 U/L (ref 12–45)
BASOPHILS # BLD AUTO: 0.4 % (ref 0–1.8)
BASOPHILS # BLD: 0.07 K/UL (ref 0–0.12)
BILIRUB SERPL-MCNC: 0.3 MG/DL (ref 0.1–1.5)
BUN SERPL-MCNC: 48 MG/DL (ref 8–22)
CALCIUM SERPL-MCNC: 10.5 MG/DL (ref 8.5–10.5)
CHLORIDE SERPL-SCNC: 100 MMOL/L (ref 96–112)
CO2 SERPL-SCNC: 19 MMOL/L (ref 20–33)
CREAT SERPL-MCNC: 2.12 MG/DL (ref 0.5–1.4)
EOSINOPHIL # BLD AUTO: 0 K/UL (ref 0–0.51)
EOSINOPHIL NFR BLD: 0 % (ref 0–6.9)
ERYTHROCYTE [DISTWIDTH] IN BLOOD BY AUTOMATED COUNT: 61.1 FL (ref 35.9–50)
GLOBULIN SER CALC-MCNC: 3.9 G/DL (ref 1.9–3.5)
GLUCOSE SERPL-MCNC: 145 MG/DL (ref 65–99)
HCT VFR BLD AUTO: 40 % (ref 37–47)
HGB BLD-MCNC: 12.7 G/DL (ref 12–16)
IMM GRANULOCYTES # BLD AUTO: 0.1 K/UL (ref 0–0.11)
IMM GRANULOCYTES NFR BLD AUTO: 0.6 % (ref 0–0.9)
LYMPHOCYTES # BLD AUTO: 0.78 K/UL (ref 1–4.8)
LYMPHOCYTES NFR BLD: 5 % (ref 22–41)
MCH RBC QN AUTO: 27.1 PG (ref 27–33)
MCHC RBC AUTO-ENTMCNC: 31.8 G/DL (ref 33.6–35)
MCV RBC AUTO: 85.3 FL (ref 81.4–97.8)
MONOCYTES # BLD AUTO: 0.86 K/UL (ref 0–0.85)
MONOCYTES NFR BLD AUTO: 5.5 % (ref 0–13.4)
NEUTROPHILS # BLD AUTO: 13.85 K/UL (ref 2–7.15)
NEUTROPHILS NFR BLD: 88.5 % (ref 44–72)
NRBC # BLD AUTO: 0 K/UL
NRBC BLD-RTO: 0 /100 WBC
PLATELET # BLD AUTO: 485 K/UL (ref 164–446)
PMV BLD AUTO: 9.2 FL (ref 9–12.9)
POTASSIUM SERPL-SCNC: 5.5 MMOL/L (ref 3.6–5.5)
PROT SERPL-MCNC: 7.3 G/DL (ref 6–8.2)
RBC # BLD AUTO: 4.69 M/UL (ref 4.2–5.4)
SODIUM SERPL-SCNC: 135 MMOL/L (ref 135–145)
WBC # BLD AUTO: 15.7 K/UL (ref 4.8–10.8)

## 2021-10-12 PROCEDURE — 700105 HCHG RX REV CODE 258: Performed by: NURSE PRACTITIONER

## 2021-10-12 PROCEDURE — 770004 HCHG ROOM/CARE - ONCOLOGY PRIVATE *

## 2021-10-12 PROCEDURE — 85025 COMPLETE CBC W/AUTO DIFF WBC: CPT

## 2021-10-12 PROCEDURE — 700105 HCHG RX REV CODE 258: Performed by: INTERNAL MEDICINE

## 2021-10-12 PROCEDURE — 700111 HCHG RX REV CODE 636 W/ 250 OVERRIDE (IP): Performed by: NURSE PRACTITIONER

## 2021-10-12 PROCEDURE — 94640 AIRWAY INHALATION TREATMENT: CPT

## 2021-10-12 PROCEDURE — A9270 NON-COVERED ITEM OR SERVICE: HCPCS | Performed by: STUDENT IN AN ORGANIZED HEALTH CARE EDUCATION/TRAINING PROGRAM

## 2021-10-12 PROCEDURE — 700111 HCHG RX REV CODE 636 W/ 250 OVERRIDE (IP): Performed by: STUDENT IN AN ORGANIZED HEALTH CARE EDUCATION/TRAINING PROGRAM

## 2021-10-12 PROCEDURE — 700102 HCHG RX REV CODE 250 W/ 637 OVERRIDE(OP): Performed by: STUDENT IN AN ORGANIZED HEALTH CARE EDUCATION/TRAINING PROGRAM

## 2021-10-12 PROCEDURE — 306581 SET INFUSION,POWERLOC 20G X 1: Performed by: NURSE PRACTITIONER

## 2021-10-12 PROCEDURE — 80053 COMPREHEN METABOLIC PANEL: CPT

## 2021-10-12 PROCEDURE — 97602 WOUND(S) CARE NON-SELECTIVE: CPT

## 2021-10-12 PROCEDURE — 99233 SBSQ HOSP IP/OBS HIGH 50: CPT | Performed by: INTERNAL MEDICINE

## 2021-10-12 PROCEDURE — 94669 MECHANICAL CHEST WALL OSCILL: CPT

## 2021-10-12 PROCEDURE — 302106 OSTOMY POWDER: Performed by: INTERNAL MEDICINE

## 2021-10-12 PROCEDURE — 700101 HCHG RX REV CODE 250: Performed by: INTERNAL MEDICINE

## 2021-10-12 PROCEDURE — 99232 SBSQ HOSP IP/OBS MODERATE 35: CPT | Mod: 24 | Performed by: SURGERY

## 2021-10-12 PROCEDURE — 99232 SBSQ HOSP IP/OBS MODERATE 35: CPT | Performed by: NURSE PRACTITIONER

## 2021-10-12 PROCEDURE — 700101 HCHG RX REV CODE 250: Performed by: NURSE PRACTITIONER

## 2021-10-12 PROCEDURE — 97161 PT EVAL LOW COMPLEX 20 MIN: CPT

## 2021-10-12 PROCEDURE — 302098 PASTE RING (FLAT): Performed by: INTERNAL MEDICINE

## 2021-10-12 PROCEDURE — 302097 BARRIER RING,CONVEX 40MM: Performed by: INTERNAL MEDICINE

## 2021-10-12 RX ORDER — 0.9 % SODIUM CHLORIDE 0.9 %
20 VIAL (ML) INJECTION PRN
Status: CANCELLED | OUTPATIENT
Start: 2021-10-18

## 2021-10-12 RX ORDER — SODIUM CHLORIDE 9 MG/ML
INJECTION, SOLUTION INTRAVENOUS CONTINUOUS
Status: DISCONTINUED | OUTPATIENT
Start: 2021-10-12 | End: 2021-10-13

## 2021-10-12 RX ORDER — CHOLECALCIFEROL (VITAMIN D3) 125 MCG
5 CAPSULE ORAL NIGHTLY
Status: DISCONTINUED | OUTPATIENT
Start: 2021-10-12 | End: 2021-10-22 | Stop reason: HOSPADM

## 2021-10-12 RX ORDER — HEPARIN SODIUM (PORCINE) LOCK FLUSH IV SOLN 100 UNIT/ML 100 UNIT/ML
500 SOLUTION INTRAVENOUS PRN
Status: CANCELLED | OUTPATIENT
Start: 2021-10-18

## 2021-10-12 RX ORDER — SODIUM CHLORIDE FOR INHALATION 7 %
4 VIAL, NEBULIZER (ML) INHALATION
Status: DISCONTINUED | OUTPATIENT
Start: 2021-10-12 | End: 2021-10-22 | Stop reason: HOSPADM

## 2021-10-12 RX ADMIN — METRONIDAZOLE 500 MG: 500 INJECTION, SOLUTION INTRAVENOUS at 14:11

## 2021-10-12 RX ADMIN — HEPARIN SODIUM 5000 UNITS: 5000 INJECTION, SOLUTION INTRAVENOUS; SUBCUTANEOUS at 05:40

## 2021-10-12 RX ADMIN — ALBUTEROL SULFATE 2.5 MG: 2.5 SOLUTION RESPIRATORY (INHALATION) at 20:32

## 2021-10-12 RX ADMIN — ONDANSETRON 4 MG: 2 INJECTION INTRAMUSCULAR; INTRAVENOUS at 18:12

## 2021-10-12 RX ADMIN — OXYCODONE 2.5 MG: 5 TABLET ORAL at 09:56

## 2021-10-12 RX ADMIN — HEPARIN SODIUM 5000 UNITS: 5000 INJECTION, SOLUTION INTRAVENOUS; SUBCUTANEOUS at 22:05

## 2021-10-12 RX ADMIN — CEFTRIAXONE SODIUM 1 G: 1 INJECTION, POWDER, FOR SOLUTION INTRAMUSCULAR; INTRAVENOUS at 09:44

## 2021-10-12 RX ADMIN — METRONIDAZOLE 500 MG: 500 INJECTION, SOLUTION INTRAVENOUS at 22:06

## 2021-10-12 RX ADMIN — DOCUSATE SODIUM 50 MG AND SENNOSIDES 8.6 MG 2 TABLET: 8.6; 5 TABLET, FILM COATED ORAL at 18:03

## 2021-10-12 RX ADMIN — OXYCODONE 5 MG: 5 TABLET ORAL at 02:11

## 2021-10-12 RX ADMIN — METRONIDAZOLE 500 MG: 500 INJECTION, SOLUTION INTRAVENOUS at 05:40

## 2021-10-12 RX ADMIN — HEPARIN SODIUM 5000 UNITS: 5000 INJECTION, SOLUTION INTRAVENOUS; SUBCUTANEOUS at 14:12

## 2021-10-12 RX ADMIN — OXYCODONE 5 MG: 5 TABLET ORAL at 22:42

## 2021-10-12 RX ADMIN — OXYCODONE 5 MG: 5 TABLET ORAL at 15:57

## 2021-10-12 RX ADMIN — SODIUM CHLORIDE: 9 INJECTION, SOLUTION INTRAVENOUS at 14:11

## 2021-10-12 RX ADMIN — OXYCODONE 5 MG: 5 TABLET ORAL at 05:52

## 2021-10-12 ASSESSMENT — COGNITIVE AND FUNCTIONAL STATUS - GENERAL
CLIMB 3 TO 5 STEPS WITH RAILING: A LOT
MOBILITY SCORE: 20
WALKING IN HOSPITAL ROOM: A LITTLE
STANDING UP FROM CHAIR USING ARMS: A LITTLE
SUGGESTED CMS G CODE MODIFIER MOBILITY: CJ

## 2021-10-12 ASSESSMENT — ENCOUNTER SYMPTOMS
ABDOMINAL PAIN: 1
COUGH: 1
SHORTNESS OF BREATH: 1

## 2021-10-12 ASSESSMENT — PAIN DESCRIPTION - PAIN TYPE
TYPE: ACUTE PAIN

## 2021-10-12 ASSESSMENT — GAIT ASSESSMENTS: GAIT LEVEL OF ASSIST: REFUSED

## 2021-10-12 NOTE — PROGRESS NOTES
Telephone Encounter    Spoke to the patient briefly via phone this evening.  Aware that patient is in the hospital for a partial bowel obstruction.  [facility] Medical Oncologist is aware of her admission.  Please contact us if needed.  We will be available.    [facility]   Office of Dr. Noel Bello  [phone]

## 2021-10-12 NOTE — PROGRESS NOTES
Hem/Onc Visit     Date of Admit:  10/10/2021  HD#: 1      Chief Complaint/Admit dx:  Bowel Obstruction (pt transferred from OSH for small bowel obstruction. hx or colorectal cancer and ileostomy)          Subjective:  HPI/Interval History:  Visited patient today after concerned phone to outpatient oncology office regarding possibility of surgical intervention.   Patient stated she is feeling better overall from prior to admission, but still with abdominal pain. Taking oxycodone as needed. NG to suction. Bowel rest per management team. No output via ostomy. Patient still with SOB. Minimal urine output. Bladder scan completed.          Pertinent cancer history:  Patient with a long history of rectal carcinoma initially diagnosed in 2012.  She was found to have metastatic disease to liver and lung in 2015.  She is status post liver ablation as well as has received Y 90 in the past.  She was on XELOX initially but had allergic reaction to oxaliplatin (last dose 7/28/15) and was switched to single agent 5-FU. Patient initially on 5-FU at 2400 mg/m2 with Leucovorin and 5-FU push starting on 10/27/15, followed by 20% dose reduction to 1920 mg/m2 for cycle 18 (7/5/16), followed by deletion of Leucovorin and 5-FU push on 10/31/17. In January 2020 patient was changed to every 3-week cycle at cycle #99 ([place] #91) and then changed to every 4-week interval in May 2020.  It was at that time there was noted to have growth of disease within the liver and was seen by Dr. West, surgeon.  Patient did undergo surgery in hopes of resection of the tumor but it was felt to be too close to the hilum and therefore she did undergo ablation instead on 5/28/20.  She has been back on single agent 5-FU at every 2-week interval, restarted on 6/16/20. Last CT 10/06/20 showed the tumor ablation cavities within the right lobe of the liver have decreased in size since previous examination.  There is a 1.5 x 3.1 cm focal area of  enhancement adjacent to the ablation cavity that appears to have increased concerning for possible tumor recurrence.  Based on that finding she was sent for an MRI for further evaluation on 10/8/2020 which confirmed that the appearance of the masses noted in the liver did not have an appearance for suspicious finding for recurrent metastatic disease.  Patient continued with MRI follow up imaging every 3 months, most recent on 4/5/21 showed stable appearance of the liver with distortion of the right lobe consistent with prior partial hepatectomy and ablation.  No evidence of recurrent tumor.  Enhancing soft tissue nodule in the right cardiophrenic angle again seen and unchanged consistent with adenopathy, measuring 17 mm in size.      March 2021 patient had experienced significant fatigue and shortness of breath.  This had progressively worsened. She was hospitalized in March. She was seen by cardiology and echocardiogram completed which showed an ejection fraction of 65%.  CT chest with contrast completed in March was stable. Continued and progressively worsening SOB, repeat CT chest 5/18/2021. Patient was seen by pulmonary 5/19/2021 and VQ scan negative. MRI brain completed on 5/20/2021 showed no evidence of metastatic disease.     She was seen by Dr. Steele, who felt the patient has received enough 5FU and was transitioned to Irinotecan/Avastin, first dose 7/6/21. She received 1 cycle and then hospitalized and treated for pneumonia. Patient continued with severe respiratory distress and cycle 2 was delayed until resolution of symptoms. She then received cycle 2 on 8/17 and again had significant issues. She has been off of therapy since then and has been monitored. Plan is to hold systemic therapy until performance status improves with monitoring of labs including CEA.       Review of Systems   Constitutional: Positive for malaise/fatigue.   Respiratory: Positive for cough and shortness of breath.   "  Gastrointestinal: Positive for abdominal pain.        No ostomy output   Genitourinary: Negative for dysuria.         Allergies   Allergen Reactions   • Oxaliplatin Anaphylaxis   • Amoxicillin Rash     Rash all over body   • Codeine      \"gets drunk\"   • Pcn [Penicillins] Itching     itching   • Sulfa Drugs Itching     itching   • Tape Rash     PAPER TAPE OK           Objective:  /88   Pulse (!) 123   Temp 36.3 °C (97.4 °F) (Temporal)   Resp 18   Ht 1.651 m (5' 5\")   Wt 60.8 kg (134 lb)   LMP  (LMP Unknown)   SpO2 99%   BMI 22.30 kg/m²       Physical Exam  Cardiovascular:      Rate and Rhythm: Tachycardia present.   Pulmonary:      Comments: SOB noted with coarse lung sounds - this is chronic for patient  Abdominal:      Comments: No bowel sounds   Neurological:      Mental Status: She is alert.   Psychiatric:      Comments: Very anxious and scared of current clinical status             CT-ABDOMEN-PELVIS WITH    Result Date: 10/11/2021  10/10/2021 11:35 PM HISTORY/REASON FOR EXAM:  Abdominal pain. History of metastatic colorectal cancer with ileostomy.. TECHNIQUE/EXAM DESCRIPTION:   CT scan of the abdomen and pelvis with contrast. Contrast-enhanced helical scanning was obtained from the diaphragmatic domes through the pubic symphysis following the bolus administration of nonionic contrast without complication. 100 mL of Omnipaque 350 nonionic contrast was administered without complication. Low dose optimization technique was utilized for this CT exam including automated exposure control and adjustment of the mA and/or kV according to patient size. COMPARISON: 10/5/2020. FINDINGS: Lower chest: Mild bronchiectasis in the lower lobes. Tree-in-bud nodules in the right lower lobe, with some dependent volume loss. Minimal scarring in the left lower lobe. Liver: Posttreatment changes in the right hepatic lobe, with marked right hepatic atrophy. Multiloculated cystic structure in the right hepatic dome " measures 10.1 x 8.9 cm. Prior ablation to this region. Evaluation for recurrent tumor in this region is difficult on this single phase study. No large circumscribed enhancing lesions are seen in this region. Hypertrophy of the left hepatic lobe. No left hepatic lesions. No intrahepatic biliary dilatation. Gallbladder: Cholecystectomy. Common bile duct: Nondilated. Pancreas: Unremarkable. Spleen: No mass. Adrenals: No mass. Kidneys: No suspicious mass lesions. No hydronephrosis. Stomach, small bowel, colon: Postsurgical changes in the bowel and colon, with the right lower quadrant ileostomy. Dilatation of multiple small bowel loops, consistent with bowel obstruction. The transition point is in the pelvis, 20-30 cm proximal to the ileostomy. Peritoneal cavity: No ascites. Lymph nodes: No enlarged nodes by size criteria. Aorta: No aneurysm. Atherosclerosis. Pelvic organs: Unremarkable. Musculoskeletal structures: No acute fracture or destructive lesion.     1.  Small bowel obstruction, with transition point is in the pelvis, 20-30 cm proximal to the ileostomy. This is most likely due to adhesions and less likely due to internal hernia. 2.  Posttreatment changes in the right hepatic dome, with a 10.1 cm multiloculated fluid collection at the site of prior ablation. Infected collection is unlikely in the absence of internal pockets of air, but can be considered in the appropriate clinical settings. 3.  No definitive evidence of abdominopelvic metastatic disease. Evaluation for liver metastases is difficult on this single phase study. 4.  Bronchiectasis/scarring in the lower lobes, right worse than left.    CT-CHEST (THORAX) W/O    Result Date: 9/23/2021 9/23/2021 12:59 PM HISTORY/REASON FOR EXAM:  Cough, persistent; persistent cough. History of rectal cancer with metastases to liver. TECHNIQUE/EXAM DESCRIPTION: CT scan of the chest without contrast. Noncontrast helical scanning of the chest was obtained from the lung  apices through the adrenal glands. Low dose optimization technique was utilized for this CT exam including automated exposure control and adjustment of the mA and/or kV according to patient size. COMPARISON: 7/20/2021 chest CT, 4/5/2021 abdominal MRI FINDINGS: Lungs: There is redemonstration of BILATERAL lower lobe bronchiectasis. There are faint areas of tree-in-bud pattern airspace disease in the posterior RIGHT upper lobe and more conspicuously in the lower lobes. There is decreased BILATERAL lower lobe peribronchial volume loss and consolidation though some of this persists in the RIGHT lower lobe. No suspicious nodule or mass. Mediastinum/Randa: No significant adenopathy. Pleura: No pleural effusion. Cardiac: Heart normal in size without pericardial effusion. Vascular: There is scattered calcific atherosclerotic plaque. There is a LEFT internal jugular chest port with its tip in the RIGHT atrium. Soft tissues: Unremarkable. Bones: No acute or destructive process. Heterogeneity with underlying partially calcified mass is redemonstrated in the RIGHT liver dome. There is a RIGHT-sided diaphragmatic area of fluid measuring 9.9 x 8.1 x 3.1 cm.     1.  9.9 x 8.1 x 3.1 cm fluid collection under and compressing the RIGHT diaphragm, adjacent to the patient's known RIGHT hepatic mass 2.  Persistent but overall decreased BILATERAL dependent chronic appearing airspace disease, possibly related to aspiration pneumonia. This remains most advanced in the RIGHT lower lobe overlying the subdiaphragmatic fluid collection.         Assessment and Plan:  Small bowel obstruction (HCC)- (present on admission)  Assessment & Plan  Currently on bowel rest. Per surgeon note, patient not interested in surgery at this time and monitoring patient with conservative management. NG to suction. Will defer to surgery team and hospitalist.     Secondary malignant neoplasm of liver (HCC)- (present on admission)  Assessment & Plan  Currently being  monitored (see note above) and 6 month liver MRI currently scheduled for 10/18/21. Will re-evaluate if still admitted.     Malignant neoplasm of rectum (HCC)- (present on admission)  Assessment & Plan  Last chemotherapy treatment 8/17/21 - currently on hold until performance status improves from respiratory issues.   Last CEA improved to 3.0.      Please do not hesitate to call if needed. We will follow from periphery but are available for any questions or concerns.       ANDREW Krishnan  [facility]  Office of Dr. Bello  Phone: [phone]  Fax: [phone]

## 2021-10-12 NOTE — PROGRESS NOTES
"  Surgical Progress Note    Author: Camilo Faust M.D. Date & Time created: 10/12/2021   1:49 PM   Consult sbo 10/11  Interval Events:  Karlene Walters reprots feeling improvewd  States pain present but much improved  No output ostomy  Discussed with her option surgery discussed exploratory laparotomy she stated she elects further observation  ROS   Receiving therapy cancer, fatigue  abdominal pain, distention no output ostomy   Chest pain with inspiration, shortness of breathproductive cough as above otherwise negative    Hemodynamics:  /86   Pulse (!) 125   Temp 37 °C (98.6 °F) (Temporal)   Resp 19   Ht 1.651 m (5' 5\")   Wt 60.8 kg (134 lb)   SpO2 95%      Respiratory:    Respiration: 19, Pulse Oximetry: 95 %     Work Of Breathing / Effort: (P) Within Normal Limits  RUL Breath Sounds: (P) Clear, RML Breath Sounds: (P) Diminished, RLL Breath Sounds: (P) Diminished, EARNEST Breath Sounds: (P) Clear, LLL Breath Sounds: (P) Diminished  Fluids:    Intake/Output Summary (Last 24 hours) at 10/12/2021 1349  Last data filed at 10/12/2021 0600  Gross per 24 hour   Intake --   Output 800 ml   Net -800 ml     Admit Weight: 60.8 kg (134 lb)  Current      Physical Exam  Constitutional: Awake, alert, oriented x3. No acute distress. GCS 15. E4 V5 M6.  Head: No cephalohematoma.no bleeding ears nose or mouth ng in place  Neck: No tracheal deviation. No stridor  Cardiovascular: increased rate skin warm brisk cap refill  Pulmonary/Chest:breathing with ease no cough no distress   Abdominal: Soft, distended. Mild tender to palpation. Ostomy healthy no output  Neurological: awake alert friendly cooperative   Skin: Skin is warm and dry.    Psychiatric:  Normal mood and affect.  Behavior is appropriate.    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]    • " Sepsis (HCC) [A41.9]    • Small bowel obstruction (HCC) [K56.609]    • Metabolic acidosis, increased anion gap (IAG) [E87.2]    • Metastatic colorectal cancer (HCC) [C19]    • BRIONNA (acute kidney injury) (HCC) [N17.9]    • Hyponatremia [E87.1]      Core Measures & Quality Metrics:  Core Measures & Quality Metrics  LELA Score      Assessment/Plan  Exam with increased distention seems more tender but she states it is not and reports improvement  Discussed with her option surgery discussed exploratory laparotomy she stated she does not consent now but elects further observation  Continue non-operative therapy  close monitoring and support  Follow up

## 2021-10-12 NOTE — DISCHARGE PLANNING
Anticipated Discharge Disposition: TBD, home with help, home health vs. SNF for short term stay.     Action: SW attended IDT rounds and reviewed chart. Pt currently has NG tube for bowel obstruction treatment, and is currently not having any ostomy output. Pt may require surgery. PT saw pt today, however, plan to see pt again tomorrow before making discharge recommendations. OT consult pending. Currently, there are no orders/referrals for HCM to complete.     Barriers to Discharge: Medical clearance, admitted with Small Bowel Obstruction. Complex medical needs.      Plan: [facility] to follow for any discharge planning needs, family will transport back home to [place] when medically cleared.

## 2021-10-12 NOTE — THERAPY
Physical Therapy   Initial Evaluation     Patient Name: Karlene Walters  Age:  81 y.o., Sex:  female  Medical Record #: [id number]  Today's Date: 10/12/2021    Precautions: Fall Risk;Nasogastric Tube (NG to suction)    Assessment  Patient is 81 y.o. female presenting acutely with abdominal pain, currently has NG tube to suction for SBO. Pt able to negotiate bed mob and transfer with SPV. Reliance on furniture to negotiate transfer. She declined amb trial 2/2 NG tube despite max encouragement and insurance PT would manage this line for her. She was up in chair at end of session as she was c/o back pain prior to assessment. Will return tomorrow for amb assessment, anticipate pt will be able to amb based off strength she demonstrated for transfer. However, per nsg family has been reporting that pt has been crawling up/down the stairs at home.    Plan    Recommend Physical Therapy 4 times per week until therapy goals are met for the following treatments:  Equipment, Gait Training, Neuro Re-Education / Balance, Stair Training, Therapeutic Activities and Therapeutic Exercises    DC Equipment Recommendations: Unable to determine at this time (dependent upon if pt has 4WW at home)  Discharge Recommendations: Other - (TBD dependent upon amb assessment, will follow-up tomorrow)     Objective     10/12/21 0803   Prior Living Situation   Prior Services Intermittent Physical Support for ADL Per Family   Housing / Facility Mobile Home   Steps Into Home 5   Rail Both Rail (Steps into Home)   Equipment Owned 4-Wheel Walker;Single Point Cane   Lives with - Patient's Self Care Capacity Spouse   Comments Per chart review, pt has 4WW/SPC at home, confirmed this with pt. RN reporting family reports pt does not have walker for home.   Prior Level of Functional Mobility   Bed Mobility Independent   Transfer Status Independent   Ambulation Independent   Distance Ambulation (Feet) (Limited household distances)   Assistive Devices Used  Single Point Cane   Stairs Unable To Determine At This Time   Comments Pt reports negotiating stairs however per nsg family reports pt was crawling up/down the stairs.   Cognition    Level of Consciousness Alert   New Learning Impaired   Attention Impaired   Initiation Impaired   Comments poor initiation/participation, fearful of lines, poor insight into deficits, self limiting   Active ROM Lower Body    Active ROM Lower Body  WDL   Strength Lower Body   Gross Strength Generalized Weakness, Equal Bilaterally   Comments adequate strength for transferring with UE support, no evidence of knee buckling   Balance Assessment   Sitting Balance (Static) Fair +   Sitting Balance (Dynamic) Fair +   Standing Balance (Static) Fair   Standing Balance (Dynamic) Fair -   Comments w/ BUE support for bed>chair transfer   Gait Analysis   Gait Level Of Assist Refused (2/2 NG tube)   Weight Bearing Status No restrictions   Bed Mobility    Supine to Sit Supervised (HOB minimally elevated)   Sit to Supine (Up in chair post session)   Functional Mobility   Bed, Chair, Wheelchair Transfer Supervised   Short Term Goals    Short Term Goal # 1 Pt will amb >50ft with LRAD and SPV within 6 visits to negotiate limited household distances for DC.   Short Term Goal # 2 Pt will ascend/descend 3 steps with BHR hold and SPV within 6 visits to enter/exit home safely at DC.

## 2021-10-12 NOTE — PROGRESS NOTES
[facility] Medicine Daily Progress Note    Date of Service  10/12/2021    Chief Complaint  Karlene Walters is a 81 y.o. female admitted 10/10/2021 with small bowel obstruction, nausea, vomiting    Hospital Course  No notes on file    Interval Problem Update  Patient was seen and examined at bedside.  I have personally reviewed and interpreted vitals, labs, and imaging.    10/12.  Afebrile.  Has been tachycardic.  On 3-4 liters nasal cannula.  Denies fever, chills, chest pains, shortness of breath.  She does report some abdominal pain.  No ostomy output and minimal urine output.  With BRIONNA, high anion gap acidosis I did start an IV fluids.  Abdomen is less distended and pain is improved.  She does request something for sleep.  Discussed with general surgery, oncology    I have personally seen and examined the patient at bedside. I discussed the plan of care with patient.  Nurse, social work    Consultants/Specialty  general surgery    Code Status  Full Code    Disposition  Patient is not medically cleared.   Anticipate discharge to to home with close outpatient follow-up.  I have placed the appropriate orders for post-discharge needs.    Review of Systems  Review of Systems   Constitutional: Negative for chills and fever.   HENT: Negative for congestion and sore throat.    Eyes: Negative for blurred vision.   Respiratory: Negative for cough and shortness of breath.    Cardiovascular: Negative for chest pain, palpitations and leg swelling.   Gastrointestinal: Positive for abdominal pain, nausea and vomiting. Negative for constipation and diarrhea.   Genitourinary: Negative for dysuria, frequency and urgency.        Minimal urine output through pure wick.   Musculoskeletal: Negative for falls.   Skin: Negative for rash.   Neurological: Negative for dizziness, weakness and headaches.   Psychiatric/Behavioral: Negative for depression. The patient is nervous/anxious.    All other systems reviewed and are negative.        Physical Exam  Temp:  [35.9 °C (96.6 °F)-37 °C (98.6 °F)] 37 °C (98.6 °F)  Pulse:  [] 125  Resp:  [18-20] 19  BP: (126-144)/(71-99) 126/86  SpO2:  [95 %-98 %] 95 %    Physical Exam  Vitals and nursing note reviewed.   Constitutional:       Appearance: Normal appearance. She is ill-appearing.   HENT:      Head: Normocephalic and atraumatic.      Right Ear: External ear normal.      Left Ear: External ear normal.      Nose: Nose normal.      Comments: NG tube in place     Mouth/Throat:      Mouth: Mucous membranes are moist.      Pharynx: Oropharynx is clear. No oropharyngeal exudate or posterior oropharyngeal erythema.   Eyes:      Extraocular Movements: Extraocular movements intact.      Conjunctiva/sclera: Conjunctivae normal.   Cardiovascular:      Rate and Rhythm: Regular rhythm. Tachycardia present.      Pulses: Normal pulses.      Heart sounds: Normal heart sounds. No murmur heard.     Pulmonary:      Effort: Pulmonary effort is normal. No respiratory distress.      Breath sounds: No stridor. Rhonchi present. No wheezing or rales.   Abdominal:      General: Bowel sounds are decreased. There is distension.      Palpations: There is no mass.      Tenderness: There is abdominal tenderness.      Comments: Ostomy in place   Musculoskeletal:      Cervical back: Normal range of motion.   Skin:     General: Skin is warm.      Capillary Refill: Capillary refill takes less than 2 seconds.   Neurological:      General: No focal deficit present.      Mental Status: She is alert and oriented to person, place, and time. Mental status is at baseline.      Cranial Nerves: No cranial nerve deficit.   Psychiatric:         Mood and Affect: Mood is anxious.         Behavior: Behavior normal.         Fluids    Intake/Output Summary (Last 24 hours) at 10/12/2021 1034  Last data filed at 10/12/2021 0600  Gross per 24 hour   Intake --   Output 800 ml   Net -800 ml       Laboratory  Recent Labs     10/10/21  5717  10/12/21  0042   WBC 29.3* 15.7*   RBC 4.97 4.69   HEMOGLOBIN 13.8 12.7   HEMATOCRIT 43.8 40.0   MCV 88.1 85.3   MCH 27.8 27.1   MCHC 31.5* 31.8*   RDW 62.8* 61.1*   PLATELETCT 560* 485*   MPV 9.0 9.2     Recent Labs     10/10/21  2238 10/12/21  0042   SODIUM 130* 135   POTASSIUM 5.5 5.5   CHLORIDE 98 100   CO2 15* 19*   GLUCOSE 209* 145*   BUN 28* 48*   CREATININE 1.50* 2.12*   CALCIUM 10.5 10.5     Recent Labs     10/11/21  0322   INR 1.12               Imaging  DX-ABDOMEN FOR TUBE PLACEMENT   Final Result      Enteric tube tip projects over the stomach      CT-ABDOMEN-PELVIS WITH   Final Result      1.  Small bowel obstruction, with transition point is in the pelvis, 20-30 cm proximal to the ileostomy. This is most likely due to adhesions and less likely due to internal hernia.   2.  Posttreatment changes in the right hepatic dome, with a 10.1 cm multiloculated fluid collection at the site of prior ablation. Infected collection is unlikely in the absence of internal pockets of air, but can be considered in the appropriate    clinical settings.   3.  No definitive evidence of abdominopelvic metastatic disease. Evaluation for liver metastases is difficult on this single phase study.   4.  Bronchiectasis/scarring in the lower lobes, right worse than left.      DX-CHEST-PORTABLE (1 VIEW)   Final Result      Right basilar atelectasis. No focal consolidation. No effusions.           Assessment/Plan  * Sepsis (HCC)- (present on admission)  Assessment & Plan  This is Sepsis Present on admission  SIRS criteria identified on my evaluation include: Tachycardia, with heart rate greater than 90 BPM, Tachypnea, with respirations greater than 20 per minute and Leukocytosis, with WBC greater than 12,000  Source is intra-abdominal  Sepsis protocol initiated  Fluid resuscitation ordered per protocol  IV antibiotics as appropriate for source of sepsis  While organ dysfunction may be noted elsewhere in this problem list or in the  chart, degree of organ dysfunction does not meet CMS criteria for severe sepsis.  Procalcitonin ordered and minimally elevated 0.55.  Blood cultures x2, sputum culture for cough with sputum production.  Flagyl as IV antibiotic.          Metabolic acidosis, increased anion gap (IAG)- (present on admission)  Assessment & Plan  Likely secondary lactic acidosis from SBO  Trend lactic acid  Lactated Ringer infusion at 75 cc/h with cut off  Repeat CMP in a.m.  Consider bicarbonate infusion if CO2 does not increase on repeat CMP    Small bowel obstruction (HCC)- (present on admission)  Assessment & Plan  Supportive care  Lactated Ringer infusion 75 cc/h with cut off  Advance diet as tolerated however n.p.o. for active nausea and vomiting  General surgery consulted and recommends NG tube placement and evaluation by colleague in a.m.  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  Patient does have sepsis and she also reports having a cough with sputum production and possibly has 2 sources for infection.  Sputum culture ordered and pending.  Consider IR consultation    Cough with sputum- (present on admission)  Assessment & Plan  Pep therapy  Chest x-ray reveals basilar atelectasis  Robitussin-DM as needed  Sputum culture ordered and pending      Hyponatremia- (present on admission)  Assessment & Plan  Mild at level of 130, asymptomatic  Likely hypovolemic hyponatremia in lactated Ringer infusion initiated  Labs in a.m.      BRIONNA (acute kidney injury) (HCC)- (present on  admission)  Assessment & Plan  Lactated Ringer infusion at 75 cc/h  Urinalysis/urine sodium for fractional excretion of Na  Likely prerenal azotemia secondary to dehydration  Avoid nephrotoxic agents  Discontinued Xarelto and initiate heparin secondary to creatinine clearance  Consider renal ultrasound    History of pulmonary embolus (PE)- (present on admission)  Assessment & Plan  Holding Xarelto due to BRIONNA   Continue heparin for now          VTE prophylaxis: heparin ppx    I have performed a physical exam and reviewed and updated ROS and Plan today (10/12/2021). In review of yesterday's note (10/11/2021), there are no changes except as documented above.

## 2021-10-13 ENCOUNTER — ANESTHESIA (OUTPATIENT)
Dept: SURGERY | Facility: MEDICAL CENTER | Age: 81
DRG: 853 | End: 2021-10-13
Payer: MEDICARE

## 2021-10-13 ENCOUNTER — ANESTHESIA EVENT (OUTPATIENT)
Dept: SURGERY | Facility: MEDICAL CENTER | Age: 81
DRG: 853 | End: 2021-10-13
Payer: MEDICARE

## 2021-10-13 LAB
ALBUMIN SERPL BCP-MCNC: 3.4 G/DL (ref 3.2–4.9)
ALBUMIN/GLOB SERPL: 1 G/DL
ALP SERPL-CCNC: 186 U/L (ref 30–99)
ALT SERPL-CCNC: 13 U/L (ref 2–50)
ANION GAP SERPL CALC-SCNC: 15 MMOL/L (ref 7–16)
AST SERPL-CCNC: 13 U/L (ref 12–45)
BASOPHILS # BLD AUTO: 0.2 % (ref 0–1.8)
BASOPHILS # BLD: 0.03 K/UL (ref 0–0.12)
BILIRUB SERPL-MCNC: 0.3 MG/DL (ref 0.1–1.5)
BUN SERPL-MCNC: 64 MG/DL (ref 8–22)
CALCIUM SERPL-MCNC: 10.4 MG/DL (ref 8.5–10.5)
CHLORIDE SERPL-SCNC: 101 MMOL/L (ref 96–112)
CO2 SERPL-SCNC: 24 MMOL/L (ref 20–33)
CREAT SERPL-MCNC: 2.27 MG/DL (ref 0.5–1.4)
EOSINOPHIL # BLD AUTO: 0.02 K/UL (ref 0–0.51)
EOSINOPHIL NFR BLD: 0.2 % (ref 0–6.9)
ERYTHROCYTE [DISTWIDTH] IN BLOOD BY AUTOMATED COUNT: 62.3 FL (ref 35.9–50)
EXTERNAL QUALITY CONTROL: NORMAL
GLOBULIN SER CALC-MCNC: 3.5 G/DL (ref 1.9–3.5)
GLUCOSE SERPL-MCNC: 108 MG/DL (ref 65–99)
HCT VFR BLD AUTO: 38.2 % (ref 37–47)
HGB BLD-MCNC: 12.1 G/DL (ref 12–16)
IMM GRANULOCYTES # BLD AUTO: 0.1 K/UL (ref 0–0.11)
IMM GRANULOCYTES NFR BLD AUTO: 0.8 % (ref 0–0.9)
LACTATE BLD-SCNC: 2 MMOL/L (ref 0.5–2)
LYMPHOCYTES # BLD AUTO: 0.87 K/UL (ref 1–4.8)
LYMPHOCYTES NFR BLD: 6.7 % (ref 22–41)
MAGNESIUM SERPL-MCNC: 2.4 MG/DL (ref 1.5–2.5)
MCH RBC QN AUTO: 27.6 PG (ref 27–33)
MCHC RBC AUTO-ENTMCNC: 31.7 G/DL (ref 33.6–35)
MCV RBC AUTO: 87 FL (ref 81.4–97.8)
MONOCYTES # BLD AUTO: 1.02 K/UL (ref 0–0.85)
MONOCYTES NFR BLD AUTO: 7.9 % (ref 0–13.4)
NEUTROPHILS # BLD AUTO: 10.93 K/UL (ref 2–7.15)
NEUTROPHILS NFR BLD: 84.2 % (ref 44–72)
NRBC # BLD AUTO: 0 K/UL
NRBC BLD-RTO: 0 /100 WBC
PHOSPHATE SERPL-MCNC: 4.7 MG/DL (ref 2.5–4.5)
PLATELET # BLD AUTO: 444 K/UL (ref 164–446)
PMV BLD AUTO: 9.3 FL (ref 9–12.9)
POTASSIUM SERPL-SCNC: 4.7 MMOL/L (ref 3.6–5.5)
PROCALCITONIN SERPL-MCNC: 1.5 NG/ML
PROT SERPL-MCNC: 6.9 G/DL (ref 6–8.2)
RBC # BLD AUTO: 4.39 M/UL (ref 4.2–5.4)
SARS-COV+SARS-COV-2 AG RESP QL IA.RAPID: NEGATIVE
SODIUM SERPL-SCNC: 140 MMOL/L (ref 135–145)
WBC # BLD AUTO: 13 K/UL (ref 4.8–10.8)

## 2021-10-13 PROCEDURE — 160048 HCHG OR STATISTICAL LEVEL 1-5: Performed by: SURGERY

## 2021-10-13 PROCEDURE — 44005 FREEING OF BOWEL ADHESION: CPT | Performed by: SURGERY

## 2021-10-13 PROCEDURE — 97530 THERAPEUTIC ACTIVITIES: CPT

## 2021-10-13 PROCEDURE — 85025 COMPLETE CBC W/AUTO DIFF WBC: CPT

## 2021-10-13 PROCEDURE — 700105 HCHG RX REV CODE 258: Performed by: INTERNAL MEDICINE

## 2021-10-13 PROCEDURE — 84100 ASSAY OF PHOSPHORUS: CPT

## 2021-10-13 PROCEDURE — 83605 ASSAY OF LACTIC ACID: CPT

## 2021-10-13 PROCEDURE — 99232 SBSQ HOSP IP/OBS MODERATE 35: CPT | Mod: 57 | Performed by: SURGERY

## 2021-10-13 PROCEDURE — 501838 HCHG SUTURE GENERAL: Performed by: SURGERY

## 2021-10-13 PROCEDURE — 51798 US URINE CAPACITY MEASURE: CPT

## 2021-10-13 PROCEDURE — 94640 AIRWAY INHALATION TREATMENT: CPT

## 2021-10-13 PROCEDURE — 160035 HCHG PACU - 1ST 60 MINS PHASE I: Performed by: SURGERY

## 2021-10-13 PROCEDURE — 0DNB0ZZ RELEASE ILEUM, OPEN APPROACH: ICD-10-PCS | Performed by: SURGERY

## 2021-10-13 PROCEDURE — 700101 HCHG RX REV CODE 250: Performed by: STUDENT IN AN ORGANIZED HEALTH CARE EDUCATION/TRAINING PROGRAM

## 2021-10-13 PROCEDURE — 160042 HCHG SURGERY MINUTES - EA ADDL 1 MIN LEVEL 5: Performed by: SURGERY

## 2021-10-13 PROCEDURE — 97116 GAIT TRAINING THERAPY: CPT

## 2021-10-13 PROCEDURE — 160031 HCHG SURGERY MINUTES - 1ST 30 MINS LEVEL 5: Performed by: SURGERY

## 2021-10-13 PROCEDURE — 700105 HCHG RX REV CODE 258: Performed by: HOSPITALIST

## 2021-10-13 PROCEDURE — 700102 HCHG RX REV CODE 250 W/ 637 OVERRIDE(OP): Performed by: STUDENT IN AN ORGANIZED HEALTH CARE EDUCATION/TRAINING PROGRAM

## 2021-10-13 PROCEDURE — 3E0T3BZ INTRODUCTION OF ANESTHETIC AGENT INTO PERIPHERAL NERVES AND PLEXI, PERCUTANEOUS APPROACH: ICD-10-PCS | Performed by: STUDENT IN AN ORGANIZED HEALTH CARE EDUCATION/TRAINING PROGRAM

## 2021-10-13 PROCEDURE — 83735 ASSAY OF MAGNESIUM: CPT

## 2021-10-13 PROCEDURE — 80053 COMPREHEN METABOLIC PANEL: CPT

## 2021-10-13 PROCEDURE — A9270 NON-COVERED ITEM OR SERVICE: HCPCS | Performed by: STUDENT IN AN ORGANIZED HEALTH CARE EDUCATION/TRAINING PROGRAM

## 2021-10-13 PROCEDURE — 84145 PROCALCITONIN (PCT): CPT

## 2021-10-13 PROCEDURE — 700111 HCHG RX REV CODE 636 W/ 250 OVERRIDE (IP): Performed by: STUDENT IN AN ORGANIZED HEALTH CARE EDUCATION/TRAINING PROGRAM

## 2021-10-13 PROCEDURE — 64488 TAP BLOCK BI INJECTION: CPT | Performed by: SURGERY

## 2021-10-13 PROCEDURE — 87426 SARSCOV CORONAVIRUS AG IA: CPT | Performed by: SURGERY

## 2021-10-13 PROCEDURE — 160002 HCHG RECOVERY MINUTES (STAT): Performed by: SURGERY

## 2021-10-13 PROCEDURE — 99233 SBSQ HOSP IP/OBS HIGH 50: CPT | Performed by: INTERNAL MEDICINE

## 2021-10-13 PROCEDURE — 0DQB0ZZ REPAIR ILEUM, OPEN APPROACH: ICD-10-PCS | Performed by: SURGERY

## 2021-10-13 PROCEDURE — 501452 HCHG STAPLES, GIA MULTIFIRE 60/80: Performed by: SURGERY

## 2021-10-13 PROCEDURE — 770004 HCHG ROOM/CARE - ONCOLOGY PRIVATE *

## 2021-10-13 PROCEDURE — 501433 HCHG STAPLER, GIA MULTIFIRE 60/80: Performed by: SURGERY

## 2021-10-13 PROCEDURE — 160009 HCHG ANES TIME/MIN: Performed by: SURGERY

## 2021-10-13 PROCEDURE — 700111 HCHG RX REV CODE 636 W/ 250 OVERRIDE (IP): Performed by: INTERNAL MEDICINE

## 2021-10-13 PROCEDURE — 700101 HCHG RX REV CODE 250: Performed by: INTERNAL MEDICINE

## 2021-10-13 PROCEDURE — 700105 HCHG RX REV CODE 258: Performed by: STUDENT IN AN ORGANIZED HEALTH CARE EDUCATION/TRAINING PROGRAM

## 2021-10-13 RX ORDER — ROCURONIUM BROMIDE 10 MG/ML
INJECTION, SOLUTION INTRAVENOUS PRN
Status: DISCONTINUED | OUTPATIENT
Start: 2021-10-13 | End: 2021-10-13 | Stop reason: SURG

## 2021-10-13 RX ORDER — CEFOTETAN DISODIUM 2 G/20ML
INJECTION, POWDER, FOR SOLUTION INTRAMUSCULAR; INTRAVENOUS PRN
Status: DISCONTINUED | OUTPATIENT
Start: 2021-10-13 | End: 2021-10-13 | Stop reason: SURG

## 2021-10-13 RX ORDER — HALOPERIDOL 5 MG/ML
1 INJECTION INTRAMUSCULAR
Status: DISCONTINUED | OUTPATIENT
Start: 2021-10-13 | End: 2021-10-13 | Stop reason: HOSPADM

## 2021-10-13 RX ORDER — DEXAMETHASONE SODIUM PHOSPHATE 4 MG/ML
INJECTION, SOLUTION INTRA-ARTICULAR; INTRALESIONAL; INTRAMUSCULAR; INTRAVENOUS; SOFT TISSUE PRN
Status: DISCONTINUED | OUTPATIENT
Start: 2021-10-13 | End: 2021-10-13 | Stop reason: SURG

## 2021-10-13 RX ORDER — ONDANSETRON 2 MG/ML
INJECTION INTRAMUSCULAR; INTRAVENOUS PRN
Status: DISCONTINUED | OUTPATIENT
Start: 2021-10-13 | End: 2021-10-13 | Stop reason: SURG

## 2021-10-13 RX ORDER — PHENYLEPHRINE HCL IN 0.9% NACL 0.5 MG/5ML
SYRINGE (ML) INTRAVENOUS PRN
Status: DISCONTINUED | OUTPATIENT
Start: 2021-10-13 | End: 2021-10-13 | Stop reason: SURG

## 2021-10-13 RX ORDER — SODIUM CHLORIDE 9 MG/ML
1000 INJECTION, SOLUTION INTRAVENOUS ONCE
Status: COMPLETED | OUTPATIENT
Start: 2021-10-13 | End: 2021-10-13

## 2021-10-13 RX ORDER — SODIUM CHLORIDE, SODIUM LACTATE, POTASSIUM CHLORIDE, AND CALCIUM CHLORIDE .6; .31; .03; .02 G/100ML; G/100ML; G/100ML; G/100ML
1000 INJECTION, SOLUTION INTRAVENOUS ONCE
Status: COMPLETED | OUTPATIENT
Start: 2021-10-13 | End: 2021-10-13

## 2021-10-13 RX ORDER — HYDROMORPHONE HYDROCHLORIDE 1 MG/ML
0.2 INJECTION, SOLUTION INTRAMUSCULAR; INTRAVENOUS; SUBCUTANEOUS
Status: DISCONTINUED | OUTPATIENT
Start: 2021-10-13 | End: 2021-10-13 | Stop reason: HOSPADM

## 2021-10-13 RX ORDER — ONDANSETRON 2 MG/ML
4 INJECTION INTRAMUSCULAR; INTRAVENOUS
Status: DISCONTINUED | OUTPATIENT
Start: 2021-10-13 | End: 2021-10-13 | Stop reason: HOSPADM

## 2021-10-13 RX ORDER — HYDROMORPHONE HYDROCHLORIDE 1 MG/ML
1 INJECTION, SOLUTION INTRAMUSCULAR; INTRAVENOUS; SUBCUTANEOUS
Status: DISCONTINUED | OUTPATIENT
Start: 2021-10-13 | End: 2021-10-22 | Stop reason: HOSPADM

## 2021-10-13 RX ORDER — HYDROMORPHONE HYDROCHLORIDE 1 MG/ML
0.4 INJECTION, SOLUTION INTRAMUSCULAR; INTRAVENOUS; SUBCUTANEOUS
Status: DISCONTINUED | OUTPATIENT
Start: 2021-10-13 | End: 2021-10-13 | Stop reason: HOSPADM

## 2021-10-13 RX ORDER — SUCCINYLCHOLINE CHLORIDE 20 MG/ML
INJECTION INTRAMUSCULAR; INTRAVENOUS PRN
Status: DISCONTINUED | OUTPATIENT
Start: 2021-10-13 | End: 2021-10-13 | Stop reason: SURG

## 2021-10-13 RX ORDER — HYDROMORPHONE HYDROCHLORIDE 1 MG/ML
0.1 INJECTION, SOLUTION INTRAMUSCULAR; INTRAVENOUS; SUBCUTANEOUS
Status: DISCONTINUED | OUTPATIENT
Start: 2021-10-13 | End: 2021-10-13 | Stop reason: HOSPADM

## 2021-10-13 RX ORDER — SODIUM CHLORIDE, SODIUM LACTATE, POTASSIUM CHLORIDE, CALCIUM CHLORIDE 600; 310; 30; 20 MG/100ML; MG/100ML; MG/100ML; MG/100ML
INJECTION, SOLUTION INTRAVENOUS
Status: DISCONTINUED | OUTPATIENT
Start: 2021-10-13 | End: 2021-10-13 | Stop reason: SURG

## 2021-10-13 RX ORDER — LIDOCAINE HYDROCHLORIDE 20 MG/ML
INJECTION, SOLUTION EPIDURAL; INFILTRATION; INTRACAUDAL; PERINEURAL PRN
Status: DISCONTINUED | OUTPATIENT
Start: 2021-10-13 | End: 2021-10-13 | Stop reason: SURG

## 2021-10-13 RX ORDER — SODIUM CHLORIDE, SODIUM LACTATE, POTASSIUM CHLORIDE, CALCIUM CHLORIDE 600; 310; 30; 20 MG/100ML; MG/100ML; MG/100ML; MG/100ML
INJECTION, SOLUTION INTRAVENOUS CONTINUOUS
Status: DISCONTINUED | OUTPATIENT
Start: 2021-10-13 | End: 2021-10-13 | Stop reason: HOSPADM

## 2021-10-13 RX ORDER — BUPIVACAINE HYDROCHLORIDE 2.5 MG/ML
INJECTION, SOLUTION EPIDURAL; INFILTRATION; INTRACAUDAL PRN
Status: DISCONTINUED | OUTPATIENT
Start: 2021-10-13 | End: 2021-10-13 | Stop reason: SURG

## 2021-10-13 RX ORDER — SODIUM CHLORIDE, SODIUM LACTATE, POTASSIUM CHLORIDE, CALCIUM CHLORIDE 600; 310; 30; 20 MG/100ML; MG/100ML; MG/100ML; MG/100ML
INJECTION, SOLUTION INTRAVENOUS CONTINUOUS
Status: DISCONTINUED | OUTPATIENT
Start: 2021-10-13 | End: 2021-10-16

## 2021-10-13 RX ADMIN — PROPOFOL 100 MG: 10 INJECTION, EMULSION INTRAVENOUS at 13:00

## 2021-10-13 RX ADMIN — ONDANSETRON 4 MG: 2 INJECTION INTRAMUSCULAR; INTRAVENOUS at 03:48

## 2021-10-13 RX ADMIN — SUGAMMADEX 150 MG: 100 INJECTION, SOLUTION INTRAVENOUS at 14:05

## 2021-10-13 RX ADMIN — HYDROMORPHONE HYDROCHLORIDE 1 MG: 1 INJECTION, SOLUTION INTRAMUSCULAR; INTRAVENOUS; SUBCUTANEOUS at 09:07

## 2021-10-13 RX ADMIN — Medication 100 MCG: at 13:05

## 2021-10-13 RX ADMIN — ALBUTEROL SULFATE 2.5 MG: 2.5 SOLUTION RESPIRATORY (INHALATION) at 20:58

## 2021-10-13 RX ADMIN — SODIUM CHLORIDE 1000 ML: 9 INJECTION, SOLUTION INTRAVENOUS at 17:42

## 2021-10-13 RX ADMIN — CEFTRIAXONE SODIUM 1 G: 1 INJECTION, POWDER, FOR SOLUTION INTRAMUSCULAR; INTRAVENOUS at 09:07

## 2021-10-13 RX ADMIN — Medication 100 MCG: at 13:15

## 2021-10-13 RX ADMIN — SODIUM CHLORIDE 4 ML: 7 NEBU SOLN,3 % NEBU at 07:39

## 2021-10-13 RX ADMIN — HEPARIN SODIUM 5000 UNITS: 5000 INJECTION, SOLUTION INTRAVENOUS; SUBCUTANEOUS at 06:12

## 2021-10-13 RX ADMIN — ONDANSETRON 4 MG: 2 INJECTION INTRAMUSCULAR; INTRAVENOUS at 13:41

## 2021-10-13 RX ADMIN — SUCCINYLCHOLINE CHLORIDE 60 MG: 20 INJECTION, SOLUTION INTRAMUSCULAR; INTRAVENOUS; PARENTERAL at 13:00

## 2021-10-13 RX ADMIN — CEFOTETAN DISODIUM 1 G: 2 INJECTION, POWDER, FOR SOLUTION INTRAMUSCULAR; INTRAVENOUS at 13:06

## 2021-10-13 RX ADMIN — Medication 100 MCG: at 13:25

## 2021-10-13 RX ADMIN — BUPIVACAINE HYDROCHLORIDE 20 ML: 2.5 INJECTION, SOLUTION EPIDURAL; INFILTRATION; INTRACAUDAL; PERINEURAL at 14:02

## 2021-10-13 RX ADMIN — FENTANYL CITRATE 50 MCG: 50 INJECTION, SOLUTION INTRAMUSCULAR; INTRAVENOUS at 12:56

## 2021-10-13 RX ADMIN — HYDROMORPHONE HYDROCHLORIDE 1 MG: 1 INJECTION, SOLUTION INTRAMUSCULAR; INTRAVENOUS; SUBCUTANEOUS at 15:51

## 2021-10-13 RX ADMIN — SODIUM CHLORIDE, POTASSIUM CHLORIDE, SODIUM LACTATE AND CALCIUM CHLORIDE: 600; 310; 30; 20 INJECTION, SOLUTION INTRAVENOUS at 21:34

## 2021-10-13 RX ADMIN — SODIUM CHLORIDE: 9 INJECTION, SOLUTION INTRAVENOUS at 06:12

## 2021-10-13 RX ADMIN — SODIUM CHLORIDE, POTASSIUM CHLORIDE, SODIUM LACTATE AND CALCIUM CHLORIDE 1000 ML: 600; 310; 30; 20 INJECTION, SOLUTION INTRAVENOUS at 20:29

## 2021-10-13 RX ADMIN — Medication 100 MCG: at 13:07

## 2021-10-13 RX ADMIN — METRONIDAZOLE 500 MG: 500 INJECTION, SOLUTION INTRAVENOUS at 22:08

## 2021-10-13 RX ADMIN — BUPIVACAINE HYDROCHLORIDE 20 ML: 2.5 INJECTION, SOLUTION EPIDURAL; INFILTRATION; INTRACAUDAL; PERINEURAL at 14:05

## 2021-10-13 RX ADMIN — FENTANYL CITRATE 25 MCG: 50 INJECTION INTRAMUSCULAR; INTRAVENOUS at 14:35

## 2021-10-13 RX ADMIN — HEPARIN SODIUM 5000 UNITS: 5000 INJECTION, SOLUTION INTRAVENOUS; SUBCUTANEOUS at 22:09

## 2021-10-13 RX ADMIN — Medication 100 MCG: at 13:21

## 2021-10-13 RX ADMIN — EPHEDRINE SULFATE 10 MG: 50 INJECTION, SOLUTION INTRAVENOUS at 13:24

## 2021-10-13 RX ADMIN — ROCURONIUM BROMIDE 40 MG: 10 INJECTION, SOLUTION INTRAVENOUS at 13:00

## 2021-10-13 RX ADMIN — METRONIDAZOLE 500 MG: 500 INJECTION, SOLUTION INTRAVENOUS at 06:13

## 2021-10-13 RX ADMIN — ALBUTEROL SULFATE 2.5 MG: 2.5 SOLUTION RESPIRATORY (INHALATION) at 07:39

## 2021-10-13 RX ADMIN — FENTANYL CITRATE 25 MCG: 50 INJECTION INTRAMUSCULAR; INTRAVENOUS at 15:05

## 2021-10-13 RX ADMIN — DEXAMETHASONE SODIUM PHOSPHATE 4 MG: 4 INJECTION, SOLUTION INTRA-ARTICULAR; INTRALESIONAL; INTRAMUSCULAR; INTRAVENOUS; SOFT TISSUE at 13:11

## 2021-10-13 RX ADMIN — SODIUM CHLORIDE, POTASSIUM CHLORIDE, SODIUM LACTATE AND CALCIUM CHLORIDE: 600; 310; 30; 20 INJECTION, SOLUTION INTRAVENOUS at 12:53

## 2021-10-13 RX ADMIN — Medication 100 MCG: at 13:35

## 2021-10-13 RX ADMIN — LIDOCAINE HYDROCHLORIDE 40 MG: 20 INJECTION, SOLUTION EPIDURAL; INFILTRATION; INTRACAUDAL at 13:00

## 2021-10-13 RX ADMIN — SODIUM CHLORIDE: 9 INJECTION, SOLUTION INTRAVENOUS at 18:36

## 2021-10-13 RX ADMIN — OXYCODONE 5 MG: 5 TABLET ORAL at 03:48

## 2021-10-13 ASSESSMENT — ENCOUNTER SYMPTOMS
BLURRED VISION: 0
DEPRESSION: 0
SHORTNESS OF BREATH: 0
NERVOUS/ANXIOUS: 1
VOMITING: 1
CHILLS: 0
DIARRHEA: 0
ABDOMINAL PAIN: 1
FALLS: 0
HEADACHES: 0
FEVER: 0
PALPITATIONS: 0
SORE THROAT: 0
WEAKNESS: 0
DIZZINESS: 0
CONSTIPATION: 0
NAUSEA: 1
COUGH: 0

## 2021-10-13 ASSESSMENT — COGNITIVE AND FUNCTIONAL STATUS - GENERAL
MOBILITY SCORE: 17
STANDING UP FROM CHAIR USING ARMS: A LITTLE
MOVING FROM LYING ON BACK TO SITTING ON SIDE OF FLAT BED: A LITTLE
WALKING IN HOSPITAL ROOM: A LITTLE
SUGGESTED CMS G CODE MODIFIER MOBILITY: CK
CLIMB 3 TO 5 STEPS WITH RAILING: A LITTLE
MOVING TO AND FROM BED TO CHAIR: UNABLE

## 2021-10-13 ASSESSMENT — PAIN DESCRIPTION - PAIN TYPE
TYPE: ACUTE PAIN
TYPE: ACUTE PAIN
TYPE: SURGICAL PAIN
TYPE: ACUTE PAIN;SURGICAL PAIN
TYPE: SURGICAL PAIN
TYPE: ACUTE PAIN;SURGICAL PAIN

## 2021-10-13 ASSESSMENT — GAIT ASSESSMENTS
GAIT LEVEL OF ASSIST: MINIMAL ASSIST
DEVIATION: BRADYKINETIC;DECREASED HEEL STRIKE;DECREASED TOE OFF
ASSISTIVE DEVICE: FRONT WHEEL WALKER;SINGLE POINT CANE
DISTANCE (FEET): 20

## 2021-10-13 ASSESSMENT — PAIN SCALES - GENERAL: PAIN_LEVEL: 5

## 2021-10-13 NOTE — DIETARY
"Nutrition services: Day 2 of admit.  Karlene Walters is a 81 y.o. female with admitting DX of Small bowel obstruction, sepsis  History includes metastatic rectal adenocarcinoma status post ileostomy and chemotherapy , Pulmonary embolus, BRIONNA  Patient  NPO day #3.    Assessment:  Height: 165.1 cm (5' 5\")  Weight: 60.8 kg (134 lb)  Body mass index is 22.3 kg/m².  Diet/Intake: NPO    Evaluation:   · No recent significant weight loss per chart review  · IVF of NS running at 83 ml/hr per MAR  · Patient is at risk for refeeding syndrome if dextrose containing IVF starts or if PO diet resumes  · Pertinent Labs today:  Bun 64, Creatinine 2.27    Malnutrition Risk: Not determined at this time    Recommendations/Interventions/Plan:    1. If unable to start PO diet within the next 3-4 days, consider nutrition support  2. Continue to follow electrolytes through start of PO diet and/or nutrition support  3. RD following plan of care.    "

## 2021-10-13 NOTE — OP REPORT
DATE OF OPERATION: 10/13/2021     PREOPERATIVE DIAGNOSIS: Small bowel obstruction  POSTOPERATIVE DIAGNOSIS: Small bowel obstruction area of leakage perforation and distended small bowel    PROCEDURE PERFORMED: Exploratory laparotomy lysis of adhesions repair of enterotomy    SURGEON: Camilo Faust M.D.    ANESTHESIOLOGIST: Manan Rosario M.D    ANESTHESIA: General endotracheal anesthesia.        INDICATIONS: The patient is a 81 y.o.-year-old female with clinical and radiographic findings of small bowel obstruction.  She initially declined surgery.  Pain increased and she elected to proceed with surgery.  She  is taken to the operating room for Sporter laparotomy    FINDINGS: Small bowel obstruction adhesions perforation of distended small bowel proximal to obstructing band    WOUND CLASSIFICATION: Class IV, Contaminated.    SPECIMEN: None    ESTIMATED BLOOD LOSS:30 mL.    PROCEDURE: Following informed consent, the patient was properly identified, taken to the operating room and placed in supine position where general endotracheal anesthesia was administered. Intravenous antibiotics were administered by the anesthesiologist in correct time interval. Sequential compression devices were employed. The abdomen was prepped and draped into a sterile field.     Midline incision was made sharply.  Incision was carried down to the fascia.  Fascia was elevated incised.  Upon entering the abdomen distended bowel was present.  Adhesions were present distal small bowel just prior to ostomy  Adhesions were taken down sharply  Perforation was present in the distended bowel proximally.  Bowel appeared viable  Area was repaired in 2 layers Vicryl and 3-0 GI silk Lembert fashion  Bowel was evaluated proximal and distal no further obstructions present no other areas of perforation  Final inspection revealed bowel viable repair intact with no leakage  Fascia was closed with running looped PDS x2  Skin was closed with staples  Ostomy  appliance is placed  The patient tolerated the procedure well and there were no apparent complication. All sponge, needle, and instrument counts were correct on 2 separate occasions. She  was awakened, extubated, and transferred to the recovery room in satisfactory condition.   ____________________________________   Camilo Faust M.D.    DD: 10/13/2021  2:29 PM

## 2021-10-13 NOTE — OR NURSING
1413- Pt arrives to PACU from OR on 8L of oxygen via mask and OPA in place. Report received. Dressing to abd CDI.     1422- OPA removed, pt denies pain at this time. Dr. Faust at bedside, NG connected to low intermittent suction per request.    1430- Pt medicated for pain per MAR.     1449- Pt  Tian called and updated on pt status and POC.     1509- Report called to Jennifer BOWIE. Pt states minimal pain.     1524- Pt taken to room by transport on 6L of oxygen via nasal cannula (tank 3/4 full). VSS upon leaving PACU, pt comfortable, and dressing CDI.

## 2021-10-13 NOTE — THERAPY
"Physical Therapy   Daily Treatment     Patient Name: Karlene Walters  Age:  81 y.o., Sex:  female  Medical Record #: [id number]  Today's Date: 10/13/2021     Precautions: Fall Risk;Nasogastric Tube (NG to suction)    Assessment    Pt requiring max encouragement to participate,  present for today's session.  reports pt has been primarily using the cane and intermittently has to crawl up the stairs to enter their mobile home. Pt demonstrates decreased strength today requiring min A for bed mob, transfers, and amb with use of a cane. Improved safety with FWW however quick fatigue and decreased knee extension during stance phase associated with fatigue increasing fall risk. She was able to ascend/descend stairs with CGA using both HRs. Pt with decreased motivation to work on progressing strength, refused to stay up in the chair and eventually stopped responding to therapist closing her eyes and lying back down. Asked pt what her goals out of therapy would be and she stated \"I don't know\". Currently anticipate need for post-acute placement prior to DC home, especially given lack of  services in pts area.    Plan    Continue current treatment plan.    DC Equipment Recommendations: None  Discharge Recommendations: Recommend post-acute placement for additional physical therapy services prior to discharge home     Objective     10/13/21 0959   Cognition    Level of Consciousness Alert   New Learning Impaired   Initiation Impaired   Comments max encouragement to participate, pt withdrawing and closing eyes when she no longer wanted to participate, self limiting   Strength Lower Body   Gross Strength Generalized Weakness, Equal Bilaterally   Balance   Sitting Balance (Static) Fair +   Sitting Balance (Dynamic) Fair   Standing Balance (Static) Fair   Standing Balance (Dynamic) Poor +   Comments declining balance compared to yesterday's session   Gait Analysis   Gait Level Of Assist Minimal Assist (CGA with FWW) "   Assistive Device Front Wheel Walker;Single Point Cane   Distance (Feet) 20   # of Times Distance was Traveled 2   Deviation Bradykinetic;Decreased Heel Strike;Decreased Toe Off (decreased knee extension with stance phase with fatigue)   # of Stairs Climbed 3   Level of Assist with Stairs (CGA with BHR Hold)   Weight Bearing Status No restrictions   Comments quick fatigue   Bed Mobility    Supine to Sit Minimal Assist   Sit to Supine Minimal Assist   Functional Mobility   Sit to Stand Supervised   Bed, Chair, Wheelchair Transfer Minimal Assist   Short Term Goals    Short Term Goal # 1 Pt will amb >50ft with LRAD and SPV within 6 visits to negotiate limited household distances for AK.   Goal Outcome # 1 goal not met   Short Term Goal # 2 Pt will ascend/descend 3 steps with BHR hold and SPV within 6 visits to enter/exit home safely at [place].   Goal Outcome # 2 Goal not met   Short Term Goal # 3 Pt will perform all fxnl transfers with FWW and SPV within 6 visits to increase OOB activity.   Short Term Goal # 4 Pt will perform supine<>sit with HOB flat and SPV within 6 visits to improve ind with bed mob.

## 2021-10-13 NOTE — CARE PLAN
Problem: Nutritional:  Goal: Achieve adequate nutritional intake  Description: Patient will start and tolerate PO diet.  Outcome: Not Met

## 2021-10-13 NOTE — ASSESSMENT & PLAN NOTE
Last chemotherapy treatment 8/17/21 - currently on hold until performance status improves from respiratory issues.   Last CEA improved to 3.0.

## 2021-10-13 NOTE — PROGRESS NOTES
"  Surgical Progress Note    Author: Camilo Faust M.D. Date & Time created: 10/13/2021   9:39 AM   Consult sbo 10/11  Interval Events:  Karlene Walters reprotspain worsed  States she wants something done  Family  present and included in discussion at patient consent/request  Discussed again surgery exploratory laparotomy, possible lysis of adhesion possible bowel resection discussed risks benefits and alternatives  discussed open procedure  she states she elects proceed surgery    ROS   Receiving therapy cancer, fatigue  abdominal pain, distention no output ostomy   Chest pain with inspiration, shortness of breathproductive cough as above otherwise negative    Hemodynamics:  /61   Pulse (!) 121   Temp 36.2 °C (97.1 °F) (Temporal)   Resp 18   Ht 1.651 m (5' 5\")   Wt 60.8 kg (134 lb)   SpO2 90%      Respiratory:    Respiration: 18, Pulse Oximetry: 90 %     Given By:: Mouthpiece, Work Of Breathing / Effort: Within Normal Limits  RUL Breath Sounds: Clear, RML Breath Sounds: Diminished, RLL Breath Sounds: Diminished, EARNEST Breath Sounds: Clear, LLL Breath Sounds: Diminished  Fluids:    Intake/Output Summary (Last 24 hours) at 10/13/2021 0939  Last data filed at 10/12/2021 2240  Gross per 24 hour   Intake --   Output 800 ml   Net -800 ml     Admit Weight: 60.8 kg (134 lb)  Current      Physical Exam  Constitutional: Awake, alert, oriented x3. No acute distress. GCS 15. E4 V5 M6.  Head: No cephalohematoma.no bleeding ears nose or mouth ng in place  Neck: No tracheal deviation. No stridor  Cardiovascular: increased rate skin warm brisk cap refill  Pulmonary/Chest:breathing with ease no cough no distress   Abdominal: Soft, distended. Moderate tender to palpation. Ostomy healthy no output  Neurological: awake alert friendly cooperative   Skin: Skin is warm and dry.    Psychiatric:  Normal mood and affect.  Behavior is appropriate.  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]      Priority: High   • Cough with sputum [R05.8]    • Hepatic flexure mass [K63.89]    • Sepsis (HCC) [A41.9]    • Small bowel obstruction (HCC) [K56.609]    • Metabolic acidosis, increased anion gap (IAG) [E87.2]    • Metastatic colorectal cancer (HCC) [C19]    • BRIONNA (acute kidney injury) (HCC) [N17.9]    • Hyponatremia [E87.1]    • Malignant neoplasm of rectum (HCC) [C20]      Core Measures & Quality Metrics:  Core Measures & Quality Metrics  LELA Score      Assessment/Plan  Family  present and included in discussion at patient consent/request  Discussed again surgery exploratory laparotomy, possible lysis of adhesion possible bowel resection discussed risks benefits and alternatives  Risks discussed included but not limited to non therapeutic operation,   injury bowel, bladder blood vessels urters or other intra-abdominal structures, hernia recurrence of obstruction, chronic pain, bleeding infection  discussed open procedure  she states she elects proceed surgery

## 2021-10-13 NOTE — CARE PLAN
The patient is Watcher - Medium risk of patient condition declining or worsening    Shift Goals  Clinical Goals: Surgery  Patient Goals: Pain control    Progress made toward(s) clinical / shift goals:        Problem: Pain - Standard  Goal: Alleviation of pain or a reduction in pain to the patient’s comfort goal  Outcome: Progressing  Note: Pt reports pain using 0-10 scale, medicated per MAR.      Problem: Knowledge Deficit - Standard  Goal: Patient and family/care givers will demonstrate understanding of plan of care, disease process/condition, diagnostic tests and medications  Outcome: Progressing  Note: Discussed POC with pt. Surgeon at bedside and pt agreeing to proceed with surgery.

## 2021-10-13 NOTE — ASSESSMENT & PLAN NOTE
Currently on bowel rest. Per surgeon note, patient not interested in surgery at this time and monitoring patient with conservative management. NG to suction. Will defer to surgery team and hospitalist.

## 2021-10-13 NOTE — PROGRESS NOTES
[facility] Medicine Daily Progress Note    Date of Service  10/13/2021    Chief Complaint  Karlene Walters is a 81 y.o. female admitted 10/10/2021 with small bowel obstruction, nausea, vomiting    Hospital Course  No notes on file    Interval Problem Update  Patient was seen and examined at bedside.  I have personally reviewed and interpreted vitals, labs, and imaging.    10/12.  Afebrile.  Has been tachycardic.  On 3-4 liters nasal cannula.  Denies fever, chills, chest pains, shortness of breath.  She does report some abdominal pain.  No ostomy output and minimal urine output.  With BRIONNA, high anion gap acidosis I did start an IV fluids.  Abdomen is less distended and pain is improved.  She does request something for sleep.  Discussed with general surgery, oncology  10/13.  Afebrile.  Persistently tachycardic.  On 4 L nasal cannula.  Leukocytosis trending down.  Procalcitonin remains elevated.  Patient still with minimal urine output through pure wick.  Bladder scan shows 324 mL.  Kidney function remains elevated although anion gap acidosis is improved.  Continue ceftriaxone, metronidazole.  Increase maintenance fluids.  Denies fever, chills, chest pains, shortness of breath.  Still having significant abdominal pain with output from NG tube.  She is agreeable for surgery today.    I have personally seen and examined the patient at bedside. I discussed the plan of care with patient.  Nurse, social work    Consultants/Specialty  general surgery    Code Status  Full Code    Disposition  Patient is not medically cleared.   Anticipate discharge to to home with close outpatient follow-up.  I have placed the appropriate orders for post-discharge needs.    Review of Systems  Review of Systems   Constitutional: Negative for chills and fever.   HENT: Negative for congestion and sore throat.    Eyes: Negative for blurred vision.   Respiratory: Negative for cough and shortness of breath.    Cardiovascular: Negative for chest  pain, palpitations and leg swelling.   Gastrointestinal: Positive for abdominal pain, nausea and vomiting. Negative for constipation and diarrhea.   Genitourinary: Negative for dysuria, frequency and urgency.        Minimal urine output through pure wick.   Musculoskeletal: Negative for falls.   Skin: Negative for rash.   Neurological: Negative for dizziness, weakness and headaches.   Psychiatric/Behavioral: Negative for depression. The patient is nervous/anxious.    All other systems reviewed and are negative.       Physical Exam  Temp:  [36.1 °C (97 °F)-37 °C (98.6 °F)] 36.1 °C (97 °F)  Pulse:  [120-125] 124  Resp:  [18-19] 18  BP: (122-135)/(83-88) 122/86  SpO2:  [93 %-99 %] 93 %    Physical Exam  Vitals and nursing note reviewed.   Constitutional:       Appearance: Normal appearance. She is ill-appearing.   HENT:      Head: Normocephalic and atraumatic.      Right Ear: External ear normal.      Left Ear: External ear normal.      Nose: Nose normal.      Comments: NG tube in place     Mouth/Throat:      Mouth: Mucous membranes are moist.      Pharynx: Oropharynx is clear. No oropharyngeal exudate or posterior oropharyngeal erythema.   Eyes:      Extraocular Movements: Extraocular movements intact.      Conjunctiva/sclera: Conjunctivae normal.   Cardiovascular:      Rate and Rhythm: Regular rhythm. Tachycardia present.      Pulses: Normal pulses.      Heart sounds: Normal heart sounds. No murmur heard.     Pulmonary:      Effort: Pulmonary effort is normal. No respiratory distress.      Breath sounds: No stridor. Rhonchi present. No wheezing or rales.   Abdominal:      General: Bowel sounds are decreased. There is distension.      Palpations: There is no mass.      Tenderness: There is abdominal tenderness.      Comments: Ostomy in place   Musculoskeletal:      Cervical back: Normal range of motion.   Skin:     General: Skin is warm.      Capillary Refill: Capillary refill takes less than 2 seconds.    Neurological:      General: No focal deficit present.      Mental Status: She is alert and oriented to person, place, and time. Mental status is at baseline.      Cranial Nerves: No cranial nerve deficit.   Psychiatric:         Mood and Affect: Mood is anxious.         Behavior: Behavior normal.         Fluids    Intake/Output Summary (Last 24 hours) at 10/13/2021 0659  Last data filed at 10/12/2021 2240  Gross per 24 hour   Intake --   Output 800 ml   Net -800 ml       Laboratory  Recent Labs     10/10/21  2238 10/12/21  0042 10/13/21  0057   WBC 29.3* 15.7* 13.0*   RBC 4.97 4.69 4.39   HEMOGLOBIN 13.8 12.7 12.1   HEMATOCRIT 43.8 40.0 38.2   MCV 88.1 85.3 87.0   MCH 27.8 27.1 27.6   MCHC 31.5* 31.8* 31.7*   RDW 62.8* 61.1* 62.3*   PLATELETCT 560* 485* 444   MPV 9.0 9.2 9.3     Recent Labs     10/10/21  2238 10/12/21  0042 10/13/21  0057   SODIUM 130* 135 140   POTASSIUM 5.5 5.5 4.7   CHLORIDE 98 100 101   CO2 15* 19* 24   GLUCOSE 209* 145* 108*   BUN 28* 48* 64*   CREATININE 1.50* 2.12* 2.27*   CALCIUM 10.5 10.5 10.4     Recent Labs     10/11/21  0322   INR 1.12               Imaging  DX-ABDOMEN FOR TUBE PLACEMENT   Final Result      Enteric tube tip projects over the stomach      CT-ABDOMEN-PELVIS WITH   Final Result      1.  Small bowel obstruction, with transition point is in the pelvis, 20-30 cm proximal to the ileostomy. This is most likely due to adhesions and less likely due to internal hernia.   2.  Posttreatment changes in the right hepatic dome, with a 10.1 cm multiloculated fluid collection at the site of prior ablation. Infected collection is unlikely in the absence of internal pockets of air, but can be considered in the appropriate    clinical settings.   3.  No definitive evidence of abdominopelvic metastatic disease. Evaluation for liver metastases is difficult on this single phase study.   4.  Bronchiectasis/scarring in the lower lobes, right worse than left.      DX-CHEST-PORTABLE (1 VIEW)    Final Result      Right basilar atelectasis. No focal consolidation. No effusions.           Assessment/Plan  * Sepsis (HCC)- (present on admission)  Assessment & Plan  This is Sepsis Present on admission  SIRS criteria identified on my evaluation include: Tachycardia, with heart rate greater than 90 BPM, Tachypnea, with respirations greater than 20 per minute and Leukocytosis, with WBC greater than 12,000  Source is intra-abdominal   Sepsis protocol initiated  Fluid resuscitation ordered per protocol  IV antibiotics as appropriate for source of sepsis  While organ dysfunction may be noted elsewhere in this problem list or in the chart, degree of organ dysfunction does not meet CMS criteria for severe sepsis.  Procalcitonin ordered and minimally elevated 0.55.  Blood cultures x2, sputum culture for cough with sputum production.  Flagyl and ceftriaxone as IV antibiotic.    Small bowel obstruction (HCC)- (present on admission)  Assessment & Plan  Supportive care  Continue maintenance fluids  Advance diet as tolerated however n.p.o. for active nausea and vomiting  General surgery consulted and recommends NG tube placement and evaluation by colleague in a.m.  CT abdomen pelvis consistent with small bowel obstruction as seen above in imaging  Analgesics for pain control  Antiemetics for nausea and vomiting  Status post surgery on 10/13    Metabolic acidosis, increased anion gap (IAG)- (present on admission)  Assessment & Plan  Likely secondary lactic acidosis from SBO  Trend lactic acid  Repeat CMP in a.m.    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  Patient does have sepsis and she also reports having a cough  with sputum production and possibly has 2 sources for infection.  Sputum culture ordered and pending.  Consider IR consultation    Cough with sputum- (present on admission)  Assessment & Plan  Pep therapy  Chest x-ray reveals basilar atelectasis  Robitussin-DM as needed  Sputum culture ordered and pending    Hyponatremia- (present on admission)  Assessment & Plan  Resolved.  Continue to monitor on fluids    BRIONNA (acute kidney injury) (HCC)- (present on admission)  Assessment & Plan  Continue normal saline maintenance fluids  Urinalysis/urine sodium for fractional excretion of Na  Likely prerenal azotemia secondary to dehydration  Avoid nephrotoxic agents  Kidneys appear normal on CT.    History of pulmonary embolus (PE)- (present on admission)  Assessment & Plan  Holding Xarelto due to BRIONNA   Continue heparin for now        VTE prophylaxis: heparin ppx    I have performed a physical exam and reviewed and updated ROS and Plan today (10/13/2021). In review of yesterday's note (10/12/2021), there are no changes except as documented above.

## 2021-10-13 NOTE — WOUND TEAM
[facility]  Inpatient Services  Initial Wound and Skin Care Evaluation    Admission Date: 10/10/2021     Last order of IP CONSULT TO WOUND CARE was found on 10/11/2021 from Hospital Encounter on 10/10/2021     HPI, PMH, SH: Reviewed    Past Surgical History:   Procedure Laterality Date   • HEPATIC ABLATION  5/28/2020    Procedure: ABLATION, LESION, LIVER-TRISEGMENTECTOMY, MICROWAVE ABLATION, INTRA OPERATIVE ULTRA SOUND, SIMPLE RESECTED / REPAIR OF DIAPHRAGM;  Surgeon: Kit West M.D.;  Location: SURGERY [facility];  Service: General   • HEPATIC ABLATION LAPAROSCOPIC  11/15/2018    Procedure: HEPATIC ABLATION LAPAROSCOPIC.- SEGMENT 7/8;  Surgeon: Kit West M.D.;  Location: SURGERY [facility];  Service: General   • COLONOSCOPY WITH BIOPSY  8/23/2015    Procedure: COLONOSCOPY WITH BIOPSY;  Surgeon: Wilbur Glasgow M.D.;  Location: ENDOSCOPY [facility];  Service:    • HEPATIC ABLATION LAPAROSCOPIC  7/6/2015    Procedure: HEPATIC ABLATION LAPAROSCOPIC.;  Surgeon: Kit West M.D.;  Location: SURGERY [facility];  Service:    • CATH PLACEMENT Left 7/6/2015    Procedure: CATH PLACEMENT CEPHALIC POWERPORT;  Surgeon: Kit West M.D.;  Location: SURGERY [facility];  Service:    • FISTULA IN ANO REPAIR  1/28/2015    Performed by Kolton Montgomery M.D. at [facility]   • PERINEAL PROCEDURE  1/28/2015    Performed by Kolton Montgomery M.D. at [facility]   • FISTULA IN ANO REPAIR  10/15/2014    Performed by Kolton Montgomery M.D. at [facility]   • PERINEAL PROCEDURE  10/15/2014    Performed by Kolton Montgomery M.D. at [facility]   • IRRIGATION & DEBRIDEMENT GENERAL  2/19/2014    Performed by Kolton Montgomery M.D. at [facility]   • FLAP GRAFT  2/19/2014    Performed by Kolton Montgomery M.D. at [facility]   • PERINEAL PROCEDURE  12/18/2013    Performed by Kolton Montgomery M.D. at  SURGERY [facility]   • MYOCUTANEOUS FLAP  12/18/2013    Performed by Kolton Montgomery M.D. at SURGERY [facility]   • IRRIGATION & DEBRIDEMENT GENERAL  10/23/2013    Performed by Kolton Montgomery M.D. at SURGERY [facility]   • FISTULA IN ANO REPAIR  9/4/2013    Performed by Kolton Montgomery M.D. at [facility]   • FLAP ROTATION  9/4/2013    Performed by Kolton Montgomery M.D. at SURGERY [facility]   • RECOVERY  8/26/2013    Performed by Cath-Recovery Surgery at [facility] SAME DAY [facility]   • BIOPSY GENERAL  7/17/2013    Performed by Kolton Montgomery M.D. at SURGERY [facility]   • PROCTOSCOPY  7/17/2013    Performed by Kolton Montgomery M.D. at [facility]   • FISTULA IN ANO REPAIR  2/27/2013    Performed by Kolton Montgomery M.D. at SURGERY [facility]   • SIGMOIDOSCOPY  2/27/2013    Performed by Kolton Montgomery M.D. at SURGERY [facility]   • LAPAROSCOPY  10/8/2012    Performed by Kolton Montgomery M.D. at [facility]   • ILEO LOOP DIVERSION  10/8/2012    Performed by Kolton Montgomery M.D. at [facility]   • COLECTOMY  9/26/2012    Performed by Kolton Montgomery M.D. at [facility]   • COLONOSCOPY FLEX W/ENDO US  9/20/2012    Performed by Harshil Bolton M.D. at [facility]   • OTHER  2009    left eye torn retina repair   • CATARACT EXTRACTION WITH IOL  2004    bilateral   • ABDOMINAL HYSTERECTOMY TOTAL  1983   • CYST EXCISION  1972    ovarian   • COLON RESECTION     • EYE SURGERY      cataracts   • HYSTERECTOMY LAPAROSCOPY     • PB REMV 2ND CATARACT,CORN-SCLER SECTN     • TONSILLECTOMY       Social History     Tobacco Use   • Smoking status: Never Smoker   • Smokeless tobacco: Never Used   Substance Use Topics   • Alcohol use: No     Chief Complaint   Patient presents with   • Bowel Obstruction     pt transferred from [facility] for small bowel obstruction. hx or colorectal cancer and ileostomy     Diagnosis: SBO (small bowel obstruction) (HCC)  [R44.899]    Unit where seen by Wound Team: R321/00     WOUND CONSULT/FOLLOW UP RELATED TO:  Sacrum & Perineum, Est. ileostomy     WOUND HISTORY:  Pt is an older woman transferred from outside facility related to small bowel obstruction. Pt has knonw colorectal cancer with ileostomy for which she is independent with care. Pt has pelvic floor prolapse as well. Wound team was consulted regarding sacrococcygeal area. Per pt she has had a wound there for some time and her and her  are treating it at home with A&D however pt reports her  is forgetful and doesn't apply daily. Pt currently has a purwick and states she knows when she needs to void.     WOUND ASSESSMENT/LDA  Wound 10/11/21 Pressure Injury Coccyx POA stage 2 (Active)   Wound Image   10/12/21 1700   Site Assessment Clean;Dry;Pink;Red;Excoriated;Fragile    Periwound Assessment Clean;Dry;Pink;Red;Excoriated;Blanchable erythema    Margins Attached edges;Defined edges    Closure Adhesive bandage    Drainage Amount Scant    Drainage Description Serosanguineous    Treatments Cleansed;Site care;Offloading;Autolytic Debridement    Wound Cleansing Approved Wound Cleanser    Periwound Protectant Skin Protectant Wipes to Periwound    Dressing Cleansing/Solutions Not Applicable    Dressing Options Hydrocolloid Thin;Mepilex    Dressing Changed New    Dressing Status Clean;Dry;Intact    Dressing Change/Treatment Frequency Every 72 hrs, and As Needed    NEXT Dressing Change/Treatment Date 10/15/21    NEXT Weekly Photo (Inpatient Only) 10/19/21    Pressure Injury Stage 2    Wound Length (cm) 1 cm    Wound Width (cm) 1 cm    Wound Depth (cm) 0.1 cm    Wound Surface Area (cm^2) 1 cm^2    Wound Volume (cm^3) 0.1 cm^3    Shape Circular x3    Wound Odor None    Exposed Structures None    WOUND NURSE ONLY - Time Spent with Patient (mins) 60    Number of days: 1         Ileostomy RLQ (Active)   Wound Image   10/12/21 1700   Stomal Appliance Assessment Clean;Dry;Intact   "  Stoma Assessment Clean;Dry;Intact;Beefy red    Stoma Shape Budded Greater Than One Inch;Irregular    Peristomal Assessment Clean;Dry;Intact    Mucocutaneous Junction Intact    Treatment Appliance Checked    Output (mL) 200 mL    Output Color Green    WOUND RN ONLY - Stomal Appliance  2 Piece;2 3/4\" (70mm) CTF;Convex;Convex Barrier Ring, Circular, 40mm;Cymed Washer    Appliance (Pouch) # Pouch: [id number], Barrier: [id number], Convex Rin, Cymed Washer:     Appliance Brand [place]      Vascular:    SHELBY:   No results found.    Lab Values:    Lab Results   Component Value Date/Time    WBC 15.7 (H) 10/12/2021 12:42 AM    RBC 4.69 10/12/2021 12:42 AM    HEMOGLOBIN 12.7 10/12/2021 12:42 AM    HEMATOCRIT 40.0 10/12/2021 12:42 AM    HBA1C 5.5 2020 07:20 AM      Culture Results show:  No results found for this or any previous visit (from the past 720 hour(s)).    Pain Level/Medicated:  Denies pain       INTERVENTIONS BY WOUND TEAM:  Chart and images reviewed. Discussed with bedside RN. All areas of concern (based on picture review, LDA review and discussion with bedside RN) have been thoroughly assessed. Documentation of areas based on significant findings. This RN in to assess patient. Performed standard wound care which includes appropriate positioning, dressing removal and non-selective debridement. Pictures and measurements obtained weekly if/when required.  Preparation for Dressing removal: NA open to air  Non-selectively Debrided with:  NS and gauze.  Sharp debridement: NA  Raysa wound: Cleansed with NS and gauze, Prepped with no sting  Primary Dressing: hydrocolloid thin  Secondary (Outer) Dressing: Sacral mepilex    Pt has pelvic floor prolapse causing bulging area to the perineum. No advanced wound care indicated.    Pt declined to have ostomy appliance changed. Per pt she uses a 40mm round convex ring,  cymed washer (her chemo dissolves normal paste rings), 2 3/4\" Convex 2 piece barrier and pouch. Pt is " independent with changes and has supplies from home. Supplies for here left at bedside. All supplies left at bedside with exception of cymed washer as we do not have those readily available.     Interdisciplinary consultation: Patient, Bedside RN (Gricelda),     EVALUATION / RATIONALE FOR TREATMENT:  Most Recent Date:  10/12/22: Pt with known POA pressure injury to the sacrococcygeal area. Hydrocolloid thin applied to cleanse and autolytically debride. mepilex to offload pressure.     Goals: Steady decrease in wound area and depth weekly.    WOUND TEAM PLAN OF CARE ([X] for frequency of wound follow up,):   Nursing to follow orders written for wound care. Contact wound team if area fails to progress, deteriorates or with any questions/concerns  Dressing changes by wound team:                   Follow up 3 times weekly:                NPWT change 3 times weekly:     Follow up 1-2 times weekly:      Follow up Bi-Monthly:                   Follow up as needed:   X  Other (explain):     NURSING PLAN OF CARE ORDERS (X):  Dressing changes: See Dressing Care orders: X  Skin care: See Skin Care orders:   RN Prevention Protocol:   Rectal tube care: See Rectal Tube Care orders:   Other orders:    RSKIN:   CURRENTLY IN PLACE (X), APPLIED THIS VISIT (A), ORDERED (O):   Q shift Virgilio:  X  Q shift pressure point assessments:  X    Surface/Positioning   Pressure redistribution mattress        X    Low Airloss          Bariatric foam      Bariatric TA     Waffle cushion        Waffle Overlay          Reposition q 2 hours      TAPs Turning system     Z Rush Pillow     Offloading/Redistribution   Sacral Mepilex (Silicone dressing)   A  Heel Mepilex (Silicone dressing)   A      Heel float boots (Prevalon boot)             Float Heels off Bed with Pillows           Respiratory   Silicone O2 tubing       X  Gray Foam Ear protectors   X  Cannula fixation Device (Tender )          High flow offloading Clip    Elastic head band  offloading device      Anchorfast                                                         Trach with Optifoam split foam             Containment/Moisture Prevention     Rectal tube or BMS    Purwick/Condom Cath        Lynne Catheter    Barrier wipes           Barrier paste    X   Antifungal tx      Interdry        Mobilization       Up to chair     X   Ambulate   X   PT/OT      Nutrition       Dietician        Diabetes Education      PO     TF     TPN     NPO X  # days     Other        Anticipated discharge plans:   LTACH:        SNF/Rehab:                  Home Health Care:    X, pt has no advanced wound care needs but may require home health services for other health reasons       Outpatient Wound Center:            Self/Family Care:        Other:                  Vac Discharge Needs:   Not Applicable Pt not on a wound vac:     X  Regular Vac while inpatient, alternative dressing at DC:        Regular Vac in use and continued at DC:            Reg. Vac w/ Skin Sub/Biologic in use. Will need to be changed 2x wkly:      Veraflo Vac while inpatient, ok to transition to Regular Vac on Discharge:           Veraflo Vac while inpatient, will need to remain on Veraflo Vac upon discharge:

## 2021-10-13 NOTE — ASSESSMENT & PLAN NOTE
Currently being monitored (see note above) and 6 month liver MRI currently scheduled for 10/18/21. Will re-evaluate if still admitted.

## 2021-10-13 NOTE — OR SURGEON
Immediate Post OP Note    PreOp Diagnosis: Small bowel obstruction    PostOp Diagnosis: Small bowel obstruction    Procedure(s):  LAPAROTOMY, EXPLORATORY WITH REPAIR OF ENTEROTOMY - Wound Class: Contaminated  LYSIS, ADHESIONS - Wound Class: Contaminated    Surgeon(s):  Camilo Faust M.D.    Anesthesiologist/Type of Anesthesia:  Anesthesiologist: Manan Rosario M.D./General    Surgical Staff:  Circulator: Ivana Traylor R.N.  Scrub Person: Leidy Baires    Specimens removed if any:  * No specimens in log *    Estimated Blood Loss: 30    Findings: Small bowel obstruction adhesions perforation small bowel in distended bowel    Complications: None        10/13/2021 2:27 PM Camilo Faust M.D.

## 2021-10-13 NOTE — ADDENDUM NOTE
Addendum  created 10/13/21 1559 by Manan Rosario M.D.    Child order released for a procedure order, Clinical Note Signed, Intraprocedure Blocks edited, Intraprocedure Event edited

## 2021-10-13 NOTE — ANESTHESIA POSTPROCEDURE EVALUATION
Patient: Karlene Walters    Procedure Summary     Date: 10/13/21 Room / Location: [place] [address] / SURGERY [facility]    Anesthesia Start: 1253 Anesthesia Stop: 1414    Procedures:       LAPAROTOMY, EXPLORATORY WITH REPAIR OF ENTEROTOMY (N/A Abdomen)      LYSIS, ADHESIONS (N/A Abdomen) Diagnosis: (small bowel obstruction)    Surgeons: Camilo Faust M.D. Responsible Provider: Manan Rosario M.D.    Anesthesia Type: general ASA Status: 2 - Emergent          Final Anesthesia Type: general  Last vitals  BP   Blood Pressure : 132/86    Temp   35.9 °C (96.6 °F)    Pulse   (!) 123   Resp   18    SpO2   93 %      Anesthesia Post Evaluation    Patient location during evaluation: PACU  Patient participation: complete - patient participated  Level of consciousness: awake and alert  Pain score: 5    Airway patency: patent  Anesthetic complications: no  Cardiovascular status: hemodynamically stable  Respiratory status: acceptable  Hydration status: euvolemic    PONV: none          No complications documented.     Nurse Pain Score: 9 (NPRS)

## 2021-10-13 NOTE — ANESTHESIA PREPROCEDURE EVALUATION
Relevant Problems   PULMONARY   (positive) Aspiration pneumonia (HCC)   (positive) Shortness of breath      NEURO   (positive) History of pulmonary embolus (PE)         (positive) BRIONNA (acute kidney injury) (HCC)   (positive) CKD (chronic kidney disease) stage 3, GFR 30-59 ml/min (HCC)   (positive) Secondary malignant neoplasm of liver (HCC)       Physical Exam    Airway   Mallampati: II  TM distance: >3 FB  Neck ROM: full       Cardiovascular - normal exam  Rhythm: regular  Rate: normal  (-) murmur     Dental - normal exam           Pulmonary - normal exam  Breath sounds clear to auscultation     Abdominal    Neurological - normal exam                 Anesthesia Plan    ASA 3- EMERGENT   ASA physical status 3 criteria: COPDASA physical status emergent criteria: acute peritonitis    Plan - general       Airway plan will be ETT          Induction: intravenous    Postoperative Plan: Postoperative administration of opioids is intended.    Pertinent diagnostic labs and testing reviewed    Informed Consent:    Anesthetic plan and risks discussed with patient.    Use of blood products discussed with: patient whom consented to blood products.

## 2021-10-13 NOTE — ANESTHESIA PROCEDURE NOTES
Airway    Date/Time: 10/13/2021 1:01 PM  Performed by: Manan Rosario M.D.  Authorized by: Manan Rosario M.D.     Location:  OR  Urgency:  Elective  Indications for Airway Management:  Anesthesia      Spontaneous Ventilation: absent    Sedation Level:  Deep  Preoxygenated: Yes    Patient Position:  Sniffing  Final Airway Type:  Endotracheal airway  Final Endotracheal Airway:  ETT  Cuffed: Yes    Technique Used for Successful ETT Placement:  Direct laryngoscopy  Devices/Methods Used in Placement:  Cricoid pressure    Insertion Site:  Oral  Blade Type:  Mary  Laryngoscope Blade/Videolaryngoscope Blade Size:  3  ETT Size (mm):  7.0  Measured from:  Teeth  ETT to Teeth (cm):  22  Placement Verified by: auscultation and capnometry    Cormack-Lehane Classification:  Grade I - full view of glottis  Number of Attempts at Approach:  1

## 2021-10-13 NOTE — ANESTHESIA PROCEDURE NOTES
Peripheral Block    Date/Time: 10/13/2021 1:58 PM  Performed by: Manan Rosario M.D.  Authorized by: Manan Rosario M.D.     Patient Location:  OR  Start Time:  10/13/2021 1:58 PM  End Time:  10/13/2021 2:05 PM  Reason for Block: at surgeon's request and post-op pain management ONLY    patient identified, IV checked, site marked, risks and benefits discussed, surgical consent, monitors and equipment checked, pre-op evaluation and timeout performed    Patient Position:  Supine  Prep: ChloraPrep    Monitoring:  Heart rate, continuous pulse ox and cardiac monitor  Block Region:  Trunk  Trunk - Block Type:  Abdominal plane block - TAP block    Laterality:  Bilateral  Procedures: ultrasound guided  Image captured, interpreted and electronically stored.  Local Infiltration:  Lidocaine  Strength:  1 %  Dose:  3 ml  Block Type:  Single-shot  Needle Length:  100mm  Needle Gauge:  21 G  Needle Localization:  Ultrasound guidance  Injection Assessment:  Negative aspiration for heme, no paresthesia on injection, incremental injection and local visualized surrounding nerve on ultrasound  Evidence of intravascular injection: No     US Guided Transversus Abdominis Plane (TAP) Block   US probe placed at the anterior axillary line between the costal margin and iliac crest in an axial plane. External Oblique, Internal Oblique (IO) and Transversis Abdominis (TA) muscles identified.  Needle inserted anteromedial to probe in an in plane approach and advanced under direct visualization to TAP between IO and TA muscled.  After negative aspiration LA injected with ease and visualized spreading in TAP plane.

## 2021-10-14 LAB
ANION GAP SERPL CALC-SCNC: 15 MMOL/L (ref 7–16)
ANISOCYTOSIS BLD QL SMEAR: ABNORMAL
BASOPHILS # BLD AUTO: 0 % (ref 0–1.8)
BASOPHILS # BLD: 0 K/UL (ref 0–0.12)
BUN SERPL-MCNC: 64 MG/DL (ref 8–22)
BURR CELLS BLD QL SMEAR: NORMAL
CALCIUM SERPL-MCNC: 9 MG/DL (ref 8.5–10.5)
CHLORIDE SERPL-SCNC: 107 MMOL/L (ref 96–112)
CO2 SERPL-SCNC: 23 MMOL/L (ref 20–33)
CREAT SERPL-MCNC: 2.34 MG/DL (ref 0.5–1.4)
EOSINOPHIL # BLD AUTO: 0 K/UL (ref 0–0.51)
EOSINOPHIL NFR BLD: 0 % (ref 0–6.9)
ERYTHROCYTE [DISTWIDTH] IN BLOOD BY AUTOMATED COUNT: 65.5 FL (ref 35.9–50)
GLUCOSE SERPL-MCNC: 95 MG/DL (ref 65–99)
HCT VFR BLD AUTO: 34.6 % (ref 37–47)
HGB BLD-MCNC: 10.7 G/DL (ref 12–16)
LACTATE BLD-SCNC: 2.4 MMOL/L (ref 0.5–2)
LYMPHOCYTES # BLD AUTO: 0.28 K/UL (ref 1–4.8)
LYMPHOCYTES NFR BLD: 1.7 % (ref 22–41)
MACROCYTES BLD QL SMEAR: ABNORMAL
MAGNESIUM SERPL-MCNC: 2 MG/DL (ref 1.5–2.5)
MANUAL DIFF BLD: NORMAL
MCH RBC QN AUTO: 27.9 PG (ref 27–33)
MCHC RBC AUTO-ENTMCNC: 30.9 G/DL (ref 33.6–35)
MCV RBC AUTO: 90.1 FL (ref 81.4–97.8)
MICROCYTES BLD QL SMEAR: ABNORMAL
MONOCYTES # BLD AUTO: 0.43 K/UL (ref 0–0.85)
MONOCYTES NFR BLD AUTO: 2.6 % (ref 0–13.4)
MORPHOLOGY BLD-IMP: NORMAL
NEUTROPHILS # BLD AUTO: 15.69 K/UL (ref 2–7.15)
NEUTROPHILS NFR BLD: 92.2 % (ref 44–72)
NEUTS BAND NFR BLD MANUAL: 3.5 % (ref 0–10)
NRBC # BLD AUTO: 0 K/UL
NRBC BLD-RTO: 0 /100 WBC
OVALOCYTES BLD QL SMEAR: NORMAL
PHOSPHATE SERPL-MCNC: 5 MG/DL (ref 2.5–4.5)
PLATELET # BLD AUTO: 311 K/UL (ref 164–446)
PLATELET BLD QL SMEAR: NORMAL
PMV BLD AUTO: 9.6 FL (ref 9–12.9)
POIKILOCYTOSIS BLD QL SMEAR: NORMAL
POLYCHROMASIA BLD QL SMEAR: NORMAL
POTASSIUM SERPL-SCNC: 4.3 MMOL/L (ref 3.6–5.5)
RBC # BLD AUTO: 3.84 M/UL (ref 4.2–5.4)
RBC BLD AUTO: PRESENT
SODIUM SERPL-SCNC: 145 MMOL/L (ref 135–145)
WBC # BLD AUTO: 16.4 K/UL (ref 4.8–10.8)

## 2021-10-14 PROCEDURE — 700111 HCHG RX REV CODE 636 W/ 250 OVERRIDE (IP): Performed by: STUDENT IN AN ORGANIZED HEALTH CARE EDUCATION/TRAINING PROGRAM

## 2021-10-14 PROCEDURE — 770004 HCHG ROOM/CARE - ONCOLOGY PRIVATE *

## 2021-10-14 PROCEDURE — 83735 ASSAY OF MAGNESIUM: CPT

## 2021-10-14 PROCEDURE — 80048 BASIC METABOLIC PNL TOTAL CA: CPT

## 2021-10-14 PROCEDURE — 700105 HCHG RX REV CODE 258: Performed by: HOSPITALIST

## 2021-10-14 PROCEDURE — 85027 COMPLETE CBC AUTOMATED: CPT

## 2021-10-14 PROCEDURE — 84100 ASSAY OF PHOSPHORUS: CPT

## 2021-10-14 PROCEDURE — 94669 MECHANICAL CHEST WALL OSCILL: CPT

## 2021-10-14 PROCEDURE — 85007 BL SMEAR W/DIFF WBC COUNT: CPT

## 2021-10-14 PROCEDURE — A9270 NON-COVERED ITEM OR SERVICE: HCPCS | Performed by: STUDENT IN AN ORGANIZED HEALTH CARE EDUCATION/TRAINING PROGRAM

## 2021-10-14 PROCEDURE — 700105 HCHG RX REV CODE 258: Performed by: INTERNAL MEDICINE

## 2021-10-14 PROCEDURE — 83605 ASSAY OF LACTIC ACID: CPT

## 2021-10-14 PROCEDURE — 700102 HCHG RX REV CODE 250 W/ 637 OVERRIDE(OP): Performed by: STUDENT IN AN ORGANIZED HEALTH CARE EDUCATION/TRAINING PROGRAM

## 2021-10-14 PROCEDURE — 700101 HCHG RX REV CODE 250: Performed by: INTERNAL MEDICINE

## 2021-10-14 PROCEDURE — 99232 SBSQ HOSP IP/OBS MODERATE 35: CPT | Performed by: INTERNAL MEDICINE

## 2021-10-14 PROCEDURE — 94640 AIRWAY INHALATION TREATMENT: CPT

## 2021-10-14 PROCEDURE — 700111 HCHG RX REV CODE 636 W/ 250 OVERRIDE (IP): Performed by: INTERNAL MEDICINE

## 2021-10-14 RX ADMIN — OXYCODONE 5 MG: 5 TABLET ORAL at 12:04

## 2021-10-14 RX ADMIN — HEPARIN SODIUM 5000 UNITS: 5000 INJECTION, SOLUTION INTRAVENOUS; SUBCUTANEOUS at 13:03

## 2021-10-14 RX ADMIN — HYDROMORPHONE HYDROCHLORIDE 1 MG: 1 INJECTION, SOLUTION INTRAMUSCULAR; INTRAVENOUS; SUBCUTANEOUS at 00:32

## 2021-10-14 RX ADMIN — SODIUM CHLORIDE 4 ML: 7 NEBU SOLN,3 % NEBU at 07:45

## 2021-10-14 RX ADMIN — OXYCODONE 5 MG: 5 TABLET ORAL at 20:45

## 2021-10-14 RX ADMIN — ALBUTEROL SULFATE 2.5 MG: 2.5 SOLUTION RESPIRATORY (INHALATION) at 07:45

## 2021-10-14 RX ADMIN — METRONIDAZOLE 500 MG: 500 INJECTION, SOLUTION INTRAVENOUS at 13:03

## 2021-10-14 RX ADMIN — METRONIDAZOLE 500 MG: 500 INJECTION, SOLUTION INTRAVENOUS at 05:59

## 2021-10-14 RX ADMIN — DOCUSATE SODIUM 50 MG AND SENNOSIDES 8.6 MG 2 TABLET: 8.6; 5 TABLET, FILM COATED ORAL at 17:14

## 2021-10-14 RX ADMIN — SODIUM CHLORIDE, POTASSIUM CHLORIDE, SODIUM LACTATE AND CALCIUM CHLORIDE: 600; 310; 30; 20 INJECTION, SOLUTION INTRAVENOUS at 17:20

## 2021-10-14 RX ADMIN — METRONIDAZOLE 500 MG: 500 INJECTION, SOLUTION INTRAVENOUS at 20:45

## 2021-10-14 RX ADMIN — HEPARIN SODIUM 5000 UNITS: 5000 INJECTION, SOLUTION INTRAVENOUS; SUBCUTANEOUS at 05:59

## 2021-10-14 RX ADMIN — CEFTRIAXONE SODIUM 1 G: 1 INJECTION, POWDER, FOR SOLUTION INTRAMUSCULAR; INTRAVENOUS at 08:17

## 2021-10-14 RX ADMIN — OXYCODONE 5 MG: 5 TABLET ORAL at 08:17

## 2021-10-14 RX ADMIN — SODIUM CHLORIDE, POTASSIUM CHLORIDE, SODIUM LACTATE AND CALCIUM CHLORIDE: 600; 310; 30; 20 INJECTION, SOLUTION INTRAVENOUS at 05:59

## 2021-10-14 RX ADMIN — HEPARIN SODIUM 5000 UNITS: 5000 INJECTION, SOLUTION INTRAVENOUS; SUBCUTANEOUS at 20:45

## 2021-10-14 RX ADMIN — ALBUTEROL SULFATE 2.5 MG: 2.5 SOLUTION RESPIRATORY (INHALATION) at 18:48

## 2021-10-14 ASSESSMENT — ENCOUNTER SYMPTOMS
DIZZINESS: 0
PALPITATIONS: 0
COUGH: 0
ABDOMINAL PAIN: 1
FEVER: 0
SHORTNESS OF BREATH: 0
DIARRHEA: 0
FALLS: 0
CHILLS: 0
NAUSEA: 1
SORE THROAT: 0
HEADACHES: 0
DEPRESSION: 0
NERVOUS/ANXIOUS: 1
WEAKNESS: 0
VOMITING: 1
CONSTIPATION: 0
BLURRED VISION: 0

## 2021-10-14 ASSESSMENT — PAIN DESCRIPTION - PAIN TYPE
TYPE: ACUTE PAIN;SURGICAL PAIN
TYPE: ACUTE PAIN

## 2021-10-14 NOTE — PROGRESS NOTES
Hospital Medicine Daily Progress Note    Date of Service  10/14/2021    Chief Complaint  Karlene Walters is a 81 y.o. female admitted 10/10/2021 with small bowel obstruction, nausea, vomiting    Hospital Course  No notes on file    Interval Problem Update  Patient was seen and examined at bedside.  I have personally reviewed and interpreted vitals, labs, and imaging.    10/12.  Afebrile.  Has been tachycardic.  On 3-4 liters nasal cannula.  Denies fever, chills, chest pains, shortness of breath.  She does report some abdominal pain.  No ostomy output and minimal urine output.  With BRIONNA, high anion gap acidosis I did start an IV fluids.  Abdomen is less distended and pain is improved.  She does request something for sleep.  Discussed with general surgery, oncology  10/13.  Afebrile.  Persistently tachycardic.  On 4 L nasal cannula.  Leukocytosis trending down.  Procalcitonin remains elevated.  Patient still with minimal urine output through pure wick.  Bladder scan shows 324 mL.  Kidney function remains elevated although anion gap acidosis is improved.  Continue ceftriaxone, metronidazole.  Increase maintenance fluids.  Denies fever, chills, chest pains, shortness of breath.  Still having significant abdominal pain with output from NG tube.  She is agreeable for surgery today.  10/14.  Afebrile.  Has been tachycardic and hypotensive.  3-6 L nasal cannula.  Hemoglobin dropped from 12-10 after surgery yesterday.  Monitor leukocytosis.  Kidney function trending up to 2.34.  Denies fever, chills, chest pains, shortness of breath.  Abdominal pain is improved and less distention.  NG tube still to suction.  Hold off on diet until return of bowel function per surgery.    I have personally seen and examined the patient at bedside. I discussed the plan of care with patient.  Nurse, social work    Consultants/Specialty  general surgery and oncology    Code Status  Full Code    Disposition  Patient is not medically cleared.    Anticipate discharge to to home with close outpatient follow-up.  I have placed the appropriate orders for post-discharge needs.    Review of Systems  Review of Systems   Constitutional: Negative for chills and fever.   HENT: Negative for congestion and sore throat.    Eyes: Negative for blurred vision.   Respiratory: Negative for cough and shortness of breath.    Cardiovascular: Negative for chest pain, palpitations and leg swelling.   Gastrointestinal: Positive for abdominal pain, nausea and vomiting. Negative for constipation and diarrhea.   Genitourinary: Negative for dysuria, frequency and urgency.        Minimal urine output through pure wick.   Musculoskeletal: Negative for falls.   Skin: Negative for rash.   Neurological: Negative for dizziness, weakness and headaches.   Psychiatric/Behavioral: Negative for depression. The patient is nervous/anxious.    All other systems reviewed and are negative.       Physical Exam  Temp:  [35.9 °C (96.6 °F)-36.5 °C (97.7 °F)] 36.5 °C (97.7 °F)  Pulse:  [] 76  Resp:  [16-38] 18  BP: ()/() 111/67  SpO2:  [89 %-98 %] 97 %    Physical Exam  Vitals and nursing note reviewed.   Constitutional:       Appearance: Normal appearance. She is ill-appearing.   HENT:      Head: Normocephalic and atraumatic.      Right Ear: External ear normal.      Left Ear: External ear normal.      Nose: Nose normal.      Comments: NG tube in place     Mouth/Throat:      Mouth: Mucous membranes are moist.      Pharynx: Oropharynx is clear. No oropharyngeal exudate or posterior oropharyngeal erythema.   Eyes:      Extraocular Movements: Extraocular movements intact.      Conjunctiva/sclera: Conjunctivae normal.   Cardiovascular:      Rate and Rhythm: Regular rhythm. Tachycardia present.      Pulses: Normal pulses.      Heart sounds: Normal heart sounds. No murmur heard.     Pulmonary:      Effort: Pulmonary effort is normal. No respiratory distress.      Breath sounds: No stridor.  Rhonchi present. No wheezing or rales.   Abdominal:      General: Bowel sounds are decreased. There is distension.      Palpations: There is no mass.      Tenderness: There is abdominal tenderness.      Comments: Ostomy in place   Musculoskeletal:      Cervical back: Normal range of motion.   Skin:     General: Skin is warm.      Capillary Refill: Capillary refill takes less than 2 seconds.   Neurological:      General: No focal deficit present.      Mental Status: She is alert and oriented to person, place, and time. Mental status is at baseline.      Cranial Nerves: No cranial nerve deficit.   Psychiatric:         Mood and Affect: Mood is anxious.         Behavior: Behavior normal.         Fluids    Intake/Output Summary (Last 24 hours) at 10/14/2021 0801  Last data filed at 10/13/2021 1545  Gross per 24 hour   Intake 1000 ml   Output 780 ml   Net 220 ml       Laboratory  Recent Labs     10/12/21  0042 10/13/21  0057 10/14/21  0015   WBC 15.7* 13.0* 16.4*   RBC 4.69 4.39 3.84*   HEMOGLOBIN 12.7 12.1 10.7*   HEMATOCRIT 40.0 38.2 34.6*   MCV 85.3 87.0 90.1   MCH 27.1 27.6 27.9   MCHC 31.8* 31.7* 30.9*   RDW 61.1* 62.3* 65.5*   PLATELETCT 485* 444 311   MPV 9.2 9.3 9.6     Recent Labs     10/12/21  0042 10/13/21  0057 10/14/21  0015   SODIUM 135 140 145   POTASSIUM 5.5 4.7 4.3   CHLORIDE 100 101 107   CO2 19* 24 23   GLUCOSE 145* 108* 95   BUN 48* 64* 64*   CREATININE 2.12* 2.27* 2.34*   CALCIUM 10.5 10.4 9.0                   Imaging  DX-ABDOMEN FOR TUBE PLACEMENT   Final Result      Enteric tube tip projects over the stomach      CT-ABDOMEN-PELVIS WITH   Final Result      1.  Small bowel obstruction, with transition point is in the pelvis, 20-30 cm proximal to the ileostomy. This is most likely due to adhesions and less likely due to internal hernia.   2.  Posttreatment changes in the right hepatic dome, with a 10.1 cm multiloculated fluid collection at the site of prior ablation. Infected collection is unlikely in  the absence of internal pockets of air, but can be considered in the appropriate    clinical settings.   3.  No definitive evidence of abdominopelvic metastatic disease. Evaluation for liver metastases is difficult on this single phase study.   4.  Bronchiectasis/scarring in the lower lobes, right worse than left.      DX-CHEST-PORTABLE (1 VIEW)   Final Result      Right basilar atelectasis. No focal consolidation. No effusions.           Assessment/Plan  * Sepsis (HCC)- (present on admission)  Assessment & Plan  This is Sepsis Present on admission  SIRS criteria identified on my evaluation include: Tachycardia, with heart rate greater than 90 BPM, Tachypnea, with respirations greater than 20 per minute and Leukocytosis, with WBC greater than 12,000  Source is intra-abdominal.  On exploratory laparotomy showed perforation  Sepsis protocol initiated  Fluid resuscitation ordered per protocol  IV antibiotics as appropriate for source of sepsis  While organ dysfunction may be noted elsewhere in this problem list or in the chart, degree of organ dysfunction does not meet CMS criteria for severe sepsis.  Procalcitonin ordered and minimally elevated 0.55.  Blood cultures x2, sputum culture for cough with sputum production.  Flagyl and ceftriaxone as IV antibiotic.    Small bowel obstruction (HCC)- (present on admission)  Assessment & Plan  Supportive care  Continue maintenance fluids  Advance diet as tolerated however n.p.o. for active nausea and vomiting  General surgery consulted and recommends NG tube placement and evaluation by colleague in a.m.  CT abdomen pelvis consistent with small bowel obstruction as seen above in imaging  Analgesics for pain control  Antiemetics for nausea and vomiting  Status post surgery on 10/13 with lysis of adhesions and repair of enterotomy    Metabolic acidosis, increased anion gap (IAG)- (present on admission)  Assessment & Plan  Likely secondary lactic acidosis from SBO  Trend lactic  acid  Repeat CMP in a.m.    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  Patient does have sepsis and she also reports having a cough with sputum production and possibly has 2 sources for infection.  Sputum culture ordered and pending.  Consider IR consultation    Cough with sputum- (present on admission)  Assessment & Plan  Pep therapy  Chest x-ray reveals basilar atelectasis  Robitussin-DM as needed  Sputum culture ordered and pending    Hyponatremia- (present on admission)  Assessment & Plan  Resolved.  Continue to monitor on fluids    BRIONNA (acute kidney injury) (HCC)- (present on admission)  Assessment & Plan  Continue normal saline maintenance fluids  Urinalysis/urine sodium for fractional excretion of Na  Likely prerenal azotemia secondary to dehydration, sepsis also contributing  Avoid nephrotoxic agents  Kidneys appear normal on CT.    History of pulmonary embolus (PE)- (present on admission)  Assessment & Plan  Holding Xarelto due to BRIONNA   Continue heparin for now        VTE prophylaxis: heparin ppx    I have performed a physical exam and reviewed and updated ROS and Plan today (10/14/2021). In review of yesterday's note (10/13/2021), there are no changes except as documented above.

## 2021-10-14 NOTE — PROGRESS NOTES
"  Surgical Progress Note    Author: Camilo Faust M.D. Date & Time created: 10/14/2021   10:09 AM   10/13/2021   Small bowel obstruction area of leakage perforation and distended small bowel     PROCEDURE PERFORMED: Exploratory laparotomy lysis of adhesions repair of enterotomy     SURGEON: Camilo Faust M.D.  Interval Events:  Karlene Walters awake alert interactive  Report return of appetite  No output ostomy     ROS   Receiving therapy cancer, fatigue  abdominal pain, distention no output ostomy   Chest pain with inspiration, shortness of breathproductive cough as above otherwise negative    Hemodynamics:  /67   Pulse 76   Temp 36.5 °C (97.7 °F) (Temporal)   Resp 18   Ht 1.651 m (5' 5\")   Wt 60.8 kg (134 lb)   SpO2 97%      Respiratory:    Respiration: 18, Pulse Oximetry: 97 %     Given By:: Mouthpiece, Work Of Breathing / Effort: Mild;Shallow  RUL Breath Sounds: Clear, RML Breath Sounds: Diminished, RLL Breath Sounds: Diminished, EARNEST Breath Sounds: Clear, LLL Breath Sounds: Diminished  Fluids:    Intake/Output Summary (Last 24 hours) at 10/14/2021 1009  Last data filed at 10/13/2021 1545  Gross per 24 hour   Intake 1000 ml   Output 780 ml   Net 220 ml     Admit Weight: 60.8 kg (134 lb)  Current      Physical Exam  Constitutional: Awake, alert, oriented x3. No acute distress. GCS 15. E4 V5 M6.  Head: No cephalohematoma.no bleeding ears nose or mouth ng in place  Neck: No tracheal deviation. No stridor  Cardiovascular: increased rate skin warm brisk cap refill  Pulmonary/Chest:breathing with ease no cough no distress   Abdominal: Soft, distended. Moderate tender to palpation. Dressing in place appropriate post op tenderness no guarding no rebound  Ostomy healthy no output  Neurological: awake alert friendly cooperative   Skin: Skin is warm and dry.    Psychiatric:  Normal mood and affect.  Behavior is appropriate.  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]      Priority: High   • Cough with sputum [R05.8]    • Hepatic flexure mass [K63.89]    • Sepsis (HCC) [A41.9]    • Small bowel obstruction (HCC) [K56.609]    • Metabolic acidosis, increased anion gap (IAG) [E87.2]    • Metastatic colorectal cancer (HCC) [C19]    • BRIONNA (acute kidney injury) (HCC) [N17.9]    • Hyponatremia [E87.1]    • Malignant neoplasm of rectum (HCC) [C20]      Core Measures & Quality Metrics:  Core Measures & Quality Metrics  LELA Score      Assessment/Plan  recovering well pod1  Npo until return bowel function  Iv hydration  continue antibiotics 3 days for contamination  Appreciate medicine assistance

## 2021-10-14 NOTE — PROGRESS NOTES
Around 2000 I was notified the Pt BP was 164/134 which is abnormal for the Patient. I retook the Pt BP and found the opposite with a BP of 84/61. MD was notified and a fluid bolus of LR was ordered and maintenance fluids were switched to LR @125.     COVID-19 Surge in effect

## 2021-10-15 LAB
ANION GAP SERPL CALC-SCNC: 11 MMOL/L (ref 7–16)
BASOPHILS # BLD AUTO: 0.4 % (ref 0–1.8)
BASOPHILS # BLD: 0.05 K/UL (ref 0–0.12)
BUN SERPL-MCNC: 60 MG/DL (ref 8–22)
CALCIUM SERPL-MCNC: 9.2 MG/DL (ref 8.5–10.5)
CHLORIDE SERPL-SCNC: 109 MMOL/L (ref 96–112)
CO2 SERPL-SCNC: 27 MMOL/L (ref 20–33)
CREAT SERPL-MCNC: 1.85 MG/DL (ref 0.5–1.4)
EOSINOPHIL # BLD AUTO: 0.03 K/UL (ref 0–0.51)
EOSINOPHIL NFR BLD: 0.2 % (ref 0–6.9)
ERYTHROCYTE [DISTWIDTH] IN BLOOD BY AUTOMATED COUNT: 64.9 FL (ref 35.9–50)
GLUCOSE SERPL-MCNC: 78 MG/DL (ref 65–99)
HCT VFR BLD AUTO: 28.4 % (ref 37–47)
HCT VFR BLD AUTO: 29.3 % (ref 37–47)
HGB BLD-MCNC: 8.6 G/DL (ref 12–16)
HGB BLD-MCNC: 8.8 G/DL (ref 12–16)
IMM GRANULOCYTES # BLD AUTO: 0.1 K/UL (ref 0–0.11)
IMM GRANULOCYTES NFR BLD AUTO: 0.8 % (ref 0–0.9)
LACTATE BLD-SCNC: 1.3 MMOL/L (ref 0.5–2)
LYMPHOCYTES # BLD AUTO: 0.5 K/UL (ref 1–4.8)
LYMPHOCYTES NFR BLD: 4.1 % (ref 22–41)
MAGNESIUM SERPL-MCNC: 2.1 MG/DL (ref 1.5–2.5)
MCH RBC QN AUTO: 27.1 PG (ref 27–33)
MCHC RBC AUTO-ENTMCNC: 30.3 G/DL (ref 33.6–35)
MCV RBC AUTO: 89.6 FL (ref 81.4–97.8)
MONOCYTES # BLD AUTO: 0.76 K/UL (ref 0–0.85)
MONOCYTES NFR BLD AUTO: 6.3 % (ref 0–13.4)
NEUTROPHILS # BLD AUTO: 10.68 K/UL (ref 2–7.15)
NEUTROPHILS NFR BLD: 88.2 % (ref 44–72)
NRBC # BLD AUTO: 0 K/UL
NRBC BLD-RTO: 0 /100 WBC
PHOSPHATE SERPL-MCNC: 3.7 MG/DL (ref 2.5–4.5)
PLATELET # BLD AUTO: 251 K/UL (ref 164–446)
PMV BLD AUTO: 9.7 FL (ref 9–12.9)
POTASSIUM SERPL-SCNC: 3.7 MMOL/L (ref 3.6–5.5)
RBC # BLD AUTO: 3.17 M/UL (ref 4.2–5.4)
SODIUM SERPL-SCNC: 147 MMOL/L (ref 135–145)
WBC # BLD AUTO: 12.1 K/UL (ref 4.8–10.8)

## 2021-10-15 PROCEDURE — A9270 NON-COVERED ITEM OR SERVICE: HCPCS | Performed by: STUDENT IN AN ORGANIZED HEALTH CARE EDUCATION/TRAINING PROGRAM

## 2021-10-15 PROCEDURE — 99232 SBSQ HOSP IP/OBS MODERATE 35: CPT | Performed by: INTERNAL MEDICINE

## 2021-10-15 PROCEDURE — 770004 HCHG ROOM/CARE - ONCOLOGY PRIVATE *

## 2021-10-15 PROCEDURE — 700105 HCHG RX REV CODE 258: Performed by: SURGERY

## 2021-10-15 PROCEDURE — 83605 ASSAY OF LACTIC ACID: CPT

## 2021-10-15 PROCEDURE — 85025 COMPLETE CBC W/AUTO DIFF WBC: CPT

## 2021-10-15 PROCEDURE — 84100 ASSAY OF PHOSPHORUS: CPT

## 2021-10-15 PROCEDURE — 700102 HCHG RX REV CODE 250 W/ 637 OVERRIDE(OP): Performed by: STUDENT IN AN ORGANIZED HEALTH CARE EDUCATION/TRAINING PROGRAM

## 2021-10-15 PROCEDURE — 700111 HCHG RX REV CODE 636 W/ 250 OVERRIDE (IP): Performed by: STUDENT IN AN ORGANIZED HEALTH CARE EDUCATION/TRAINING PROGRAM

## 2021-10-15 PROCEDURE — 700101 HCHG RX REV CODE 250: Performed by: INTERNAL MEDICINE

## 2021-10-15 PROCEDURE — 83735 ASSAY OF MAGNESIUM: CPT

## 2021-10-15 PROCEDURE — 94640 AIRWAY INHALATION TREATMENT: CPT

## 2021-10-15 PROCEDURE — 80048 BASIC METABOLIC PNL TOTAL CA: CPT

## 2021-10-15 PROCEDURE — 700105 HCHG RX REV CODE 258: Performed by: HOSPITALIST

## 2021-10-15 PROCEDURE — 700111 HCHG RX REV CODE 636 W/ 250 OVERRIDE (IP): Performed by: SURGERY

## 2021-10-15 PROCEDURE — 85014 HEMATOCRIT: CPT

## 2021-10-15 PROCEDURE — 85018 HEMOGLOBIN: CPT

## 2021-10-15 RX ADMIN — HEPARIN SODIUM 5000 UNITS: 5000 INJECTION, SOLUTION INTRAVENOUS; SUBCUTANEOUS at 21:04

## 2021-10-15 RX ADMIN — SODIUM CHLORIDE 4 ML: 7 NEBU SOLN,3 % NEBU at 07:58

## 2021-10-15 RX ADMIN — OXYCODONE 5 MG: 5 TABLET ORAL at 11:42

## 2021-10-15 RX ADMIN — ALBUTEROL SULFATE 2.5 MG: 2.5 SOLUTION RESPIRATORY (INHALATION) at 19:46

## 2021-10-15 RX ADMIN — METRONIDAZOLE 500 MG: 500 INJECTION, SOLUTION INTRAVENOUS at 21:03

## 2021-10-15 RX ADMIN — ALBUTEROL SULFATE 2.5 MG: 2.5 SOLUTION RESPIRATORY (INHALATION) at 07:59

## 2021-10-15 RX ADMIN — SODIUM CHLORIDE, POTASSIUM CHLORIDE, SODIUM LACTATE AND CALCIUM CHLORIDE: 600; 310; 30; 20 INJECTION, SOLUTION INTRAVENOUS at 02:50

## 2021-10-15 RX ADMIN — METRONIDAZOLE 500 MG: 500 INJECTION, SOLUTION INTRAVENOUS at 16:07

## 2021-10-15 RX ADMIN — CEFTRIAXONE SODIUM 1 G: 1 INJECTION, POWDER, FOR SOLUTION INTRAMUSCULAR; INTRAVENOUS at 05:09

## 2021-10-15 RX ADMIN — OXYCODONE 5 MG: 5 TABLET ORAL at 02:55

## 2021-10-15 RX ADMIN — METRONIDAZOLE 500 MG: 500 INJECTION, SOLUTION INTRAVENOUS at 06:25

## 2021-10-15 RX ADMIN — HEPARIN SODIUM 5000 UNITS: 5000 INJECTION, SOLUTION INTRAVENOUS; SUBCUTANEOUS at 05:08

## 2021-10-15 RX ADMIN — SODIUM CHLORIDE, POTASSIUM CHLORIDE, SODIUM LACTATE AND CALCIUM CHLORIDE: 600; 310; 30; 20 INJECTION, SOLUTION INTRAVENOUS at 21:04

## 2021-10-15 RX ADMIN — HEPARIN SODIUM 5000 UNITS: 5000 INJECTION, SOLUTION INTRAVENOUS; SUBCUTANEOUS at 16:07

## 2021-10-15 ASSESSMENT — PAIN DESCRIPTION - PAIN TYPE
TYPE: ACUTE PAIN
TYPE: ACUTE PAIN
TYPE: ACUTE PAIN;SURGICAL PAIN

## 2021-10-15 ASSESSMENT — ENCOUNTER SYMPTOMS
NAUSEA: 1
HEADACHES: 0
VOMITING: 1
DIZZINESS: 0
SORE THROAT: 0
BLURRED VISION: 0
DEPRESSION: 0
ABDOMINAL PAIN: 1
NERVOUS/ANXIOUS: 1
PALPITATIONS: 0
WEAKNESS: 0
DIARRHEA: 0
FALLS: 0
CHILLS: 0
SHORTNESS OF BREATH: 0
COUGH: 0
CONSTIPATION: 0
FEVER: 0

## 2021-10-15 NOTE — PROGRESS NOTES
[facility] Medicine Daily Progress Note    Date of Service  10/15/2021    Chief Complaint  Karlene Walters is a 81 y.o. female admitted 10/10/2021 with small bowel obstruction, nausea, vomiting    Hospital Course  No notes on file    Interval Problem Update  Patient was seen and examined at bedside.  I have personally reviewed and interpreted vitals, labs, and imaging.    10/12.  Afebrile.  Has been tachycardic.  On 3-4 liters nasal cannula.  Denies fever, chills, chest pains, shortness of breath.  She does report some abdominal pain.  No ostomy output and minimal urine output.  With BRIONNA, high anion gap acidosis I did start an IV fluids.  Abdomen is less distended and pain is improved.  She does request something for sleep.  Discussed with general surgery, oncology  10/13.  Afebrile.  Persistently tachycardic.  On 4 L nasal cannula.  Leukocytosis trending down.  Procalcitonin remains elevated.  Patient still with minimal urine output through pure wick.  Bladder scan shows 324 mL.  Kidney function remains elevated although anion gap acidosis is improved.  Continue ceftriaxone, metronidazole.  Increase maintenance fluids.  Denies fever, chills, chest pains, shortness of breath.  Still having significant abdominal pain with output from NG tube.  She is agreeable for surgery today.  10/14.  Afebrile.  Has been tachycardic and hypotensive.  3-6 L nasal cannula.  Hemoglobin dropped from 12-10 after surgery yesterday.  Monitor leukocytosis.  Kidney function trending up to 2.34.  Denies fever, chills, chest pains, shortness of breath.  Abdominal pain is improved and less distention.  NG tube still to suction.  Hold off on diet until return of bowel function per surgery.  10/15.  Afebrile.  Hypotension is improved.  Still tachycardic.  On 3 L nasal cannula.  Leukocytosis trending down.  Drop in hemoglobin since surgery.  Continue to monitor.  Kidney function is improved.  Some hypernatremia now on LR.  Denies fever, chills,  chest pains, shortness of breath.  Abdominal pain is improved.  Having output through ostomy as well as NG tube.  Adequate urine output and improvement in kidney function.  Per surgery okay to have ice chips, meds with sips.  Advance diet per surgery.  Complete 3 days of ceftriaxone metronidazole per surgery      I have personally seen and examined the patient at bedside. I discussed the plan of care with patient.  Nurse, social work    Consultants/Specialty  general surgery and oncology    Code Status  Full Code    Disposition  Patient is not medically cleared.   Anticipate discharge to to home with close outpatient follow-up.  I have placed the appropriate orders for post-discharge needs.    Review of Systems  Review of Systems   Constitutional: Negative for chills and fever.   HENT: Negative for congestion and sore throat.    Eyes: Negative for blurred vision.   Respiratory: Negative for cough and shortness of breath.    Cardiovascular: Negative for chest pain, palpitations and leg swelling.   Gastrointestinal: Positive for abdominal pain, nausea and vomiting. Negative for constipation and diarrhea.   Genitourinary: Negative for dysuria, frequency and urgency.   Musculoskeletal: Negative for falls.   Skin: Negative for rash.   Neurological: Negative for dizziness, weakness and headaches.   Psychiatric/Behavioral: Negative for depression. The patient is nervous/anxious.    All other systems reviewed and are negative.       Physical Exam  Temp:  [35.8 °C (96.5 °F)-36.6 °C (97.9 °F)] 35.8 °C (96.5 °F)  Pulse:  [] 118  Resp:  [16-18] 16  BP: (110-129)/(65-72) 115/72  SpO2:  [94 %-98 %] 96 %    Physical Exam  Vitals and nursing note reviewed.   Constitutional:       Appearance: Normal appearance. She is ill-appearing.   HENT:      Head: Normocephalic and atraumatic.      Right Ear: External ear normal.      Left Ear: External ear normal.      Nose: Nose normal.      Comments: NG tube in place     Mouth/Throat:       Mouth: Mucous membranes are moist.      Pharynx: Oropharynx is clear. No oropharyngeal exudate or posterior oropharyngeal erythema.   Eyes:      Extraocular Movements: Extraocular movements intact.      Conjunctiva/sclera: Conjunctivae normal.   Cardiovascular:      Rate and Rhythm: Regular rhythm. Tachycardia present.      Pulses: Normal pulses.      Heart sounds: Normal heart sounds. No murmur heard.     Pulmonary:      Effort: Pulmonary effort is normal. No respiratory distress.      Breath sounds: No stridor. Rhonchi present. No wheezing or rales.   Abdominal:      General: Bowel sounds are decreased. There is distension.      Palpations: There is no mass.      Tenderness: There is abdominal tenderness.      Comments: Ostomy in place   Musculoskeletal:      Cervical back: Normal range of motion.   Skin:     General: Skin is warm.      Capillary Refill: Capillary refill takes less than 2 seconds.   Neurological:      General: No focal deficit present.      Mental Status: She is alert and oriented to person, place, and time. Mental status is at baseline.      Cranial Nerves: No cranial nerve deficit.   Psychiatric:         Mood and Affect: Mood is anxious.         Behavior: Behavior normal.         Fluids    Intake/Output Summary (Last 24 hours) at 10/15/2021 0742  Last data filed at 10/15/2021 0509  Gross per 24 hour   Intake 5929.17 ml   Output 1800 ml   Net 4129.17 ml       Laboratory  Recent Labs     10/13/21  0057 10/13/21  0057 10/14/21  0015 10/15/21  0005 10/15/21  0645   WBC 13.0*  --  16.4* 12.1*  --    RBC 4.39  --  3.84* 3.17*  --    HEMOGLOBIN 12.1   < > 10.7* 8.6* 8.8*   HEMATOCRIT 38.2   < > 34.6* 28.4* 29.3*   MCV 87.0  --  90.1 89.6  --    MCH 27.6  --  27.9 27.1  --    MCHC 31.7*  --  30.9* 30.3*  --    RDW 62.3*  --  65.5* 64.9*  --    PLATELETCT 444  --  311 251  --    MPV 9.3  --  9.6 9.7  --     < > = values in this interval not displayed.     Recent Labs     10/13/21  0057  10/14/21  0015 10/15/21  0005   SODIUM 140 145 147*   POTASSIUM 4.7 4.3 3.7   CHLORIDE 101 107 109   CO2 24 23 27   GLUCOSE 108* 95 78   BUN 64* 64* 60*   CREATININE 2.27* 2.34* 1.85*   CALCIUM 10.4 9.0 9.2                   Imaging  DX-ABDOMEN FOR TUBE PLACEMENT   Final Result      Enteric tube tip projects over the stomach      CT-ABDOMEN-PELVIS WITH   Final Result      1.  Small bowel obstruction, with transition point is in the pelvis, 20-30 cm proximal to the ileostomy. This is most likely due to adhesions and less likely due to internal hernia.   2.  Posttreatment changes in the right hepatic dome, with a 10.1 cm multiloculated fluid collection at the site of prior ablation. Infected collection is unlikely in the absence of internal pockets of air, but can be considered in the appropriate    clinical settings.   3.  No definitive evidence of abdominopelvic metastatic disease. Evaluation for liver metastases is difficult on this single phase study.   4.  Bronchiectasis/scarring in the lower lobes, right worse than left.      DX-CHEST-PORTABLE (1 VIEW)   Final Result      Right basilar atelectasis. No focal consolidation. No effusions.           Assessment/Plan  * Sepsis (HCC)- (present on admission)  Assessment & Plan  This is Sepsis Present on admission  SIRS criteria identified on my evaluation include: Tachycardia, with heart rate greater than 90 BPM, Tachypnea, with respirations greater than 20 per minute and Leukocytosis, with WBC greater than 12,000  Source is intra-abdominal.  On exploratory laparotomy showed perforation  Sepsis protocol initiated  Fluid resuscitation ordered per protocol  IV antibiotics as appropriate for source of sepsis  While organ dysfunction may be noted elsewhere in this problem list or in the chart, degree of organ dysfunction does not meet CMS criteria for severe sepsis.  Procalcitonin ordered and minimally elevated 0.55.  Blood cultures x2, sputum culture for cough with sputum  production.  Flagyl and ceftriaxone as IV antibiotic.    Small bowel obstruction (HCC)- (present on admission)  Assessment & Plan  Supportive care  Continue maintenance fluids  Advance diet as tolerated however n.p.o. for active nausea and vomiting  General surgery consulted and recommends NG tube placement and evaluation by colleague in a.m.  CT abdomen pelvis consistent with small bowel obstruction as seen above in imaging  Analgesics for pain control  Antiemetics for nausea and vomiting  Status post surgery on 10/13 with lysis of adhesions and repair of enterotomy  Continue NG tube to suction.  Awaiting return of bowel function.  Advance diet per surgery    Metabolic acidosis, increased anion gap (IAG)- (present on admission)  Assessment & Plan  Acidosis resolved  Likely secondary lactic acidosis from SBO  Trend lactic acid  Repeat CMP in a.m.  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  Patient does have sepsis and she also reports having a cough with sputum production and possibly has 2 sources for infection.  Sputum culture ordered and pending.  Consider IR consultation    Cough with sputum- (present on admission)  Assessment & Plan  Pep therapy  Chest x-ray reveals basilar atelectasis  Robitussin-DM as needed  Sputum culture ordered and pending    Hyponatremia- (present on admission)  Assessment & Plan  Resolved.  Continue to monitor on fluids    BRIONNA (acute kidney injury) (HCC)- (present on admission)  Assessment & Plan  Continue maintenance fluids  Urinalysis/urine sodium for fractional excretion of Na  Likely prerenal azotemia secondary to dehydration, sepsis also contributing  Avoid  nephrotoxic agents  Kidneys appear normal on CT.    History of pulmonary embolus (PE)- (present on admission)  Assessment & Plan  Holding Xarelto due to BRIONNA   Continue heparin for now        VTE prophylaxis: heparin ppx    I have performed a physical exam and reviewed and updated ROS and Plan today (10/15/2021). In review of yesterday's note (10/14/2021), there are no changes except as documented above.

## 2021-10-15 NOTE — PROGRESS NOTES
Progress Note    Author:  Sarah Rahman MD    Date & Time:   10/15/2021   12:24 PM          Patient ID:             Name:             Karlene Walters   YOB: 1940  Age:                 81 y.o.  female   MRN:               [id number]    ________________________________________________________________________  Acute Care Surgery Service       Events:  ----------   POD2    Exam:       Vitals:    10/15/21 0801   BP:    Pulse: (!) 118   Resp: 16   Temp:    SpO2: 94%     SpO2  Min: 89 %  Max: 99 %  O2 (LPM)  Min: 2  Max: 8  FiO2%  Min: 32 %  Max: 32 %  Temp  Min: 35.8 °C (96.5 °F)  Max: 37 °C (98.6 °F)    Intake/Output Summary (Last 24 hours) at 10/15/2021 1224  Last data filed at 10/15/2021 0509  Gross per 24 hour   Intake 1318.75 ml   Output 1800 ml   Net -481.25 ml     Output by Drain (mL) 10/13/21 0700 - 10/13/21 1859 10/13/21 1900 - 10/14/21 0659 10/14/21 0700 - 10/14/21 1859 10/14/21 1900 - 10/15/21 0659 10/15/21 0700 - 10/15/21 1224   Patient has no LDAs of requested type attached.       DIET ORDERS (From admission to next 24h)     Start     Ordered    10/13/21 1427  Restrict to: Sips with Medications  ALL MEALS        Question:  Restrict to:  Answer:  Sips with Medications    10/13/21 1426                        Physical Exam  Nursing note reviewed.   Constitutional:       Appearance: Normal appearance. Alert, oriented x 4.NAD    Cardiovascular:      Rate and Rhythm: Normal rate and regular rhythm. Extremities warm, well perfused no edema.   Pulmonary:      Effort: Pulmonary effort is normal.      Breath sounds: Normal breath sounds. No wheezes or stridor  Abdominal:      General: Abdomen is soft,  Distended. Tender along left side  Dressing dry      Recent Labs     10/13/21  0057 10/14/21  0015 10/15/21  0005   SODIUM 140 145 147*   POTASSIUM 4.7 4.3 3.7   CHLORIDE 101 107 109   CO2 24 23 27   BUN 64* 64* 60*   CREATININE 2.27* 2.34* 1.85*   MAGNESIUM 2.4 2.0 2.1   PHOSPHORUS 4.7* 5.0* 3.7    CALCIUM 10.4 9.0 9.2       Recent Labs     10/13/21  0057 10/14/21  0015 10/15/21  0005   ALTSGPT 13  --   --    ASTSGOT 13  --   --    ALKPHOSPHAT 186*  --   --    TBILIRUBIN 0.3  --   --    GLUCOSE 108* 95 78       Recent Labs     10/13/21  0057 10/13/21  0057 10/14/21  0015 10/15/21  0005 10/15/21  0645   RBC 4.39  --  3.84* 3.17*  --    HEMOGLOBIN 12.1   < > 10.7* 8.6* 8.8*   HEMATOCRIT 38.2   < > 34.6* 28.4* 29.3*   PLATELETCT 444  --  311 251  --     < > = values in this interval not displayed.       Recent Labs     10/13/21  0057 10/14/21  0015 10/15/21  0005   WBC 13.0* 16.4* 12.1*   NEUTSPOLYS 84.20* 92.20* 88.20*   LYMPHOCYTES 6.70* 1.70* 4.10*   MONOCYTES 7.90 2.60 6.30   EOSINOPHILS 0.20 0.00 0.20   BASOPHILS 0.20 0.00 0.40   ASTSGOT 13  --   --    ALTSGPT 13  --   --    ALKPHOSPHAT 186*  --   --    TBILIRUBIN 0.3  --   --          ________________________________________________________________________      Patient Active Problem List   Diagnosis   • Malignant neoplasm of rectum (HCC)   • Anemia   • Abnormal EKG   • Thyroid nodule   • Secondary malignant neoplasm of liver (HCC)   • Dehydration   • Metastatic colon cancer in female (HCC)   • History of pulmonary embolus (PE)   • Ileitis   • Hyperlipidemia   • Shortness of breath   • Routine general medical examination at a health care facility   • Hyperglycemia   • Chemotherapy-induced neutropenia (HCC)   • Elevated alkaline phosphatase level   • Restless legs   • Incomplete rectal prolapse   • High risk medication use   • Anxiety   • Falls   • Port-A-Cath in place   • CKD (chronic kidney disease) stage 3, GFR 30-59 ml/min (HCC)   • Hemorrhagic disorder due to circulating anticoagulants (HCC)   • BRIONNA (acute kidney injury) (HCC)   • Hyponatremia   • Aspiration pneumonia (HCC)   • Cough with sputum   • Hepatic flexure mass   • Sepsis (HCC)   • Small bowel obstruction (HCC)   • Metabolic acidosis, increased anion gap (IAG)   • Metastatic colorectal cancer  (HCC)       Acute Issues/Plan:  Continue NGT  Okay for ICE chips  +heparin proph

## 2021-10-15 NOTE — CARE PLAN
Problem: Pain - Standard  Goal: Alleviation of pain or a reduction in pain to the patient’s comfort goal  Outcome: Progressing     Problem: Knowledge Deficit - Standard  Goal: Patient and family/care givers will demonstrate understanding of plan of care, disease process/condition, diagnostic tests and medications  Outcome: Progressing   The patient is Stable - Low risk of patient condition declining or worsening    Shift Goals  Clinical Goals: Pain control; rest  Patient Goals: Rest    Progress made toward(s) clinical / shift goals:  Pt states she was able to rest more last night, pain is better.     Patient is not progressing towards the following goals:

## 2021-10-15 NOTE — CARE PLAN
Problem: Nutritional:  Goal: Achieve adequate nutritional intake  Description: Patient will start and tolerate PO diet.  Outcome: Not Met     NPO day 5 - inadequate nutrition.   Per surgery note - NPO until return of bowel function, ice chips okay.     RD continues to follow for diet advancement. If unable to advance in the next 48-72 hrs, consider nutrition support.

## 2021-10-16 LAB
ANION GAP SERPL CALC-SCNC: 16 MMOL/L (ref 7–16)
BACTERIA BLD CULT: NORMAL
BACTERIA BLD CULT: NORMAL
BASOPHILS # BLD AUTO: 0.3 % (ref 0–1.8)
BASOPHILS # BLD: 0.03 K/UL (ref 0–0.12)
BUN SERPL-MCNC: 49 MG/DL (ref 8–22)
CALCIUM SERPL-MCNC: 9.4 MG/DL (ref 8.5–10.5)
CHLORIDE SERPL-SCNC: 109 MMOL/L (ref 96–112)
CO2 SERPL-SCNC: 26 MMOL/L (ref 20–33)
CREAT SERPL-MCNC: 1.13 MG/DL (ref 0.5–1.4)
EOSINOPHIL # BLD AUTO: 0.04 K/UL (ref 0–0.51)
EOSINOPHIL NFR BLD: 0.4 % (ref 0–6.9)
ERYTHROCYTE [DISTWIDTH] IN BLOOD BY AUTOMATED COUNT: 67.1 FL (ref 35.9–50)
GLUCOSE SERPL-MCNC: 74 MG/DL (ref 65–99)
HCT VFR BLD AUTO: 28.4 % (ref 37–47)
HGB BLD-MCNC: 8.6 G/DL (ref 12–16)
IMM GRANULOCYTES # BLD AUTO: 0.12 K/UL (ref 0–0.11)
IMM GRANULOCYTES NFR BLD AUTO: 1.3 % (ref 0–0.9)
LYMPHOCYTES # BLD AUTO: 0.54 K/UL (ref 1–4.8)
LYMPHOCYTES NFR BLD: 5.9 % (ref 22–41)
MAGNESIUM SERPL-MCNC: 2.2 MG/DL (ref 1.5–2.5)
MCH RBC QN AUTO: 27.1 PG (ref 27–33)
MCHC RBC AUTO-ENTMCNC: 30.3 G/DL (ref 33.6–35)
MCV RBC AUTO: 89.6 FL (ref 81.4–97.8)
MONOCYTES # BLD AUTO: 0.54 K/UL (ref 0–0.85)
MONOCYTES NFR BLD AUTO: 5.9 % (ref 0–13.4)
MORPHOLOGY BLD-IMP: NORMAL
NEUTROPHILS # BLD AUTO: 7.89 K/UL (ref 2–7.15)
NEUTROPHILS NFR BLD: 86.2 % (ref 44–72)
NRBC # BLD AUTO: 0 K/UL
NRBC BLD-RTO: 0 /100 WBC
PHOSPHATE SERPL-MCNC: 3 MG/DL (ref 2.5–4.5)
PLATELET # BLD AUTO: 224 K/UL (ref 164–446)
PMV BLD AUTO: 9.4 FL (ref 9–12.9)
POTASSIUM SERPL-SCNC: 3.1 MMOL/L (ref 3.6–5.5)
PROCALCITONIN SERPL-MCNC: 7.64 NG/ML
RBC # BLD AUTO: 3.17 M/UL (ref 4.2–5.4)
SIGNIFICANT IND 70042: NORMAL
SIGNIFICANT IND 70042: NORMAL
SITE SITE: NORMAL
SITE SITE: NORMAL
SODIUM SERPL-SCNC: 151 MMOL/L (ref 135–145)
SOURCE SOURCE: NORMAL
SOURCE SOURCE: NORMAL
WBC # BLD AUTO: 9.2 K/UL (ref 4.8–10.8)

## 2021-10-16 PROCEDURE — 700101 HCHG RX REV CODE 250: Performed by: INTERNAL MEDICINE

## 2021-10-16 PROCEDURE — A9270 NON-COVERED ITEM OR SERVICE: HCPCS | Performed by: STUDENT IN AN ORGANIZED HEALTH CARE EDUCATION/TRAINING PROGRAM

## 2021-10-16 PROCEDURE — 99232 SBSQ HOSP IP/OBS MODERATE 35: CPT | Performed by: INTERNAL MEDICINE

## 2021-10-16 PROCEDURE — 700102 HCHG RX REV CODE 250 W/ 637 OVERRIDE(OP): Performed by: STUDENT IN AN ORGANIZED HEALTH CARE EDUCATION/TRAINING PROGRAM

## 2021-10-16 PROCEDURE — 80048 BASIC METABOLIC PNL TOTAL CA: CPT

## 2021-10-16 PROCEDURE — 84100 ASSAY OF PHOSPHORUS: CPT

## 2021-10-16 PROCEDURE — 700111 HCHG RX REV CODE 636 W/ 250 OVERRIDE (IP): Performed by: STUDENT IN AN ORGANIZED HEALTH CARE EDUCATION/TRAINING PROGRAM

## 2021-10-16 PROCEDURE — 700105 HCHG RX REV CODE 258: Performed by: SURGERY

## 2021-10-16 PROCEDURE — 700111 HCHG RX REV CODE 636 W/ 250 OVERRIDE (IP): Performed by: NURSE PRACTITIONER

## 2021-10-16 PROCEDURE — 83735 ASSAY OF MAGNESIUM: CPT

## 2021-10-16 PROCEDURE — 94640 AIRWAY INHALATION TREATMENT: CPT

## 2021-10-16 PROCEDURE — 700105 HCHG RX REV CODE 258: Performed by: HOSPITALIST

## 2021-10-16 PROCEDURE — 700111 HCHG RX REV CODE 636 W/ 250 OVERRIDE (IP): Performed by: SURGERY

## 2021-10-16 PROCEDURE — 770004 HCHG ROOM/CARE - ONCOLOGY PRIVATE *

## 2021-10-16 PROCEDURE — 84145 PROCALCITONIN (PCT): CPT

## 2021-10-16 PROCEDURE — 85025 COMPLETE CBC W/AUTO DIFF WBC: CPT

## 2021-10-16 RX ORDER — DEXTROSE MONOHYDRATE, SODIUM CHLORIDE, SODIUM LACTATE, POTASSIUM CHLORIDE, CALCIUM CHLORIDE 5; 600; 310; 179; 20 G/100ML; MG/100ML; MG/100ML; MG/100ML; MG/100ML
INJECTION, SOLUTION INTRAVENOUS CONTINUOUS
Status: DISCONTINUED | OUTPATIENT
Start: 2021-10-16 | End: 2021-10-17

## 2021-10-16 RX ORDER — POTASSIUM CHLORIDE 7.45 MG/ML
10 INJECTION INTRAVENOUS
Status: COMPLETED | OUTPATIENT
Start: 2021-10-16 | End: 2021-10-16

## 2021-10-16 RX ORDER — POTASSIUM CHLORIDE 20 MEQ/1
40 TABLET, EXTENDED RELEASE ORAL ONCE
Status: DISCONTINUED | OUTPATIENT
Start: 2021-10-16 | End: 2021-10-16

## 2021-10-16 RX ADMIN — METRONIDAZOLE 500 MG: 500 INJECTION, SOLUTION INTRAVENOUS at 14:42

## 2021-10-16 RX ADMIN — METRONIDAZOLE 500 MG: 500 INJECTION, SOLUTION INTRAVENOUS at 06:02

## 2021-10-16 RX ADMIN — ALBUTEROL SULFATE 2.5 MG: 2.5 SOLUTION RESPIRATORY (INHALATION) at 20:38

## 2021-10-16 RX ADMIN — METRONIDAZOLE 500 MG: 500 INJECTION, SOLUTION INTRAVENOUS at 22:40

## 2021-10-16 RX ADMIN — ALBUTEROL SULFATE 2.5 MG: 2.5 SOLUTION RESPIRATORY (INHALATION) at 07:10

## 2021-10-16 RX ADMIN — HEPARIN SODIUM 5000 UNITS: 5000 INJECTION, SOLUTION INTRAVENOUS; SUBCUTANEOUS at 14:42

## 2021-10-16 RX ADMIN — OXYCODONE 5 MG: 5 TABLET ORAL at 22:47

## 2021-10-16 RX ADMIN — HEPARIN SODIUM 5000 UNITS: 5000 INJECTION, SOLUTION INTRAVENOUS; SUBCUTANEOUS at 22:44

## 2021-10-16 RX ADMIN — GUAIFENESIN AND DEXTROMETHORPHAN 5 ML: 100; 10 SYRUP ORAL at 08:13

## 2021-10-16 RX ADMIN — HEPARIN SODIUM 5000 UNITS: 5000 INJECTION, SOLUTION INTRAVENOUS; SUBCUTANEOUS at 06:03

## 2021-10-16 RX ADMIN — OXYCODONE 5 MG: 5 TABLET ORAL at 12:52

## 2021-10-16 RX ADMIN — POTASSIUM CHLORIDE 10 MEQ: 7.45 INJECTION INTRAVENOUS at 06:02

## 2021-10-16 RX ADMIN — CEFTRIAXONE SODIUM 1 G: 1 INJECTION, POWDER, FOR SOLUTION INTRAMUSCULAR; INTRAVENOUS at 04:39

## 2021-10-16 RX ADMIN — DEXTROSE MONOHYDRATE, SODIUM CHLORIDE, SODIUM LACTATE, POTASSIUM CHLORIDE, CALCIUM CHLORIDE: 5; 600; 310; 179; 20 INJECTION, SOLUTION INTRAVENOUS at 10:57

## 2021-10-16 RX ADMIN — POTASSIUM CHLORIDE 10 MEQ: 7.45 INJECTION INTRAVENOUS at 08:19

## 2021-10-16 RX ADMIN — SODIUM CHLORIDE, POTASSIUM CHLORIDE, SODIUM LACTATE AND CALCIUM CHLORIDE: 600; 310; 30; 20 INJECTION, SOLUTION INTRAVENOUS at 06:07

## 2021-10-16 RX ADMIN — DEXTROSE MONOHYDRATE, SODIUM CHLORIDE, SODIUM LACTATE, POTASSIUM CHLORIDE, CALCIUM CHLORIDE: 5; 600; 310; 179; 20 INJECTION, SOLUTION INTRAVENOUS at 23:41

## 2021-10-16 RX ADMIN — POTASSIUM CHLORIDE 10 MEQ: 7.45 INJECTION INTRAVENOUS at 07:09

## 2021-10-16 ASSESSMENT — ENCOUNTER SYMPTOMS
FALLS: 0
COUGH: 0
PALPITATIONS: 0
NAUSEA: 1
ABDOMINAL PAIN: 1
SHORTNESS OF BREATH: 0
CHILLS: 0
NERVOUS/ANXIOUS: 1
HEADACHES: 0
WEAKNESS: 0
CONSTIPATION: 0
BLURRED VISION: 0
DIZZINESS: 0
DEPRESSION: 0
FEVER: 0
SORE THROAT: 0
DIARRHEA: 0
VOMITING: 1

## 2021-10-16 ASSESSMENT — PAIN DESCRIPTION - PAIN TYPE
TYPE: ACUTE PAIN

## 2021-10-16 NOTE — CARE PLAN
"The patient is Stable - Low risk of patient condition declining or worsening    Shift Goals  Clinical Goals: mobility  Patient Goals: \"feel better\"  Family Goals: No family present    Progress made toward(s) clinical / shift goals:    Problem: Fluid Volume  Goal: Fluid volume balance will be maintained  Outcome: Progressing  Note: Diet advanced today to clears. NG tube removed. Fluids running. Pt with good output; pt using purewick. VSS.       Patient is not progressing towards the following goals:      Problem: Knowledge Deficit - Standard  Goal: Patient and family/care givers will demonstrate understanding of plan of care, disease process/condition, diagnostic tests and medications  Outcome: Not Progressing  Note: Pt refuses repositioning by staff. She has a documented wound to sacrum. Wound care/dressing chg done this shift, but pt not compliant with repositioning stating \"I'm comfortable\". Educated regarding skin integrity, pt states \"I can move myself\" and does shift about, but refused turns with pillows from staff.     Problem: Mobility  Goal: Patient's capacity to carry out activities will improve  Note: Pt refuses to ambulate cites pain. Pt will decline pain interventions at times. Educated. Pt refuses at this time.         "

## 2021-10-16 NOTE — PROGRESS NOTES
Progress Note    Author:  Sarah Rahman MD    Date & Time:   10/16/2021   10:02 AM          Patient ID:             Name:             Karlene Walters   YOB: 1940  Age:                 81 y.o.  female   MRN:               [id number]    ________________________________________________________________________  Acute Care Surgery Service       Events:  ----------  POD3   NGT output 400cc overnight w/ patient taking in ice chips    Exam:       Vitals:    10/16/21 0839   BP: 128/66   Pulse: (!) 110   Resp: 16   Temp: 36.2 °C (97.1 °F)   SpO2: 95%     SpO2  Min: 89 %  Max: 100 %  O2 (LPM)  Min: 2  Max: 8  FiO2%  Min: 32 %  Max: 32 %  Temp  Min: 35.8 °C (96.5 °F)  Max: 37 °C (98.6 °F)    Intake/Output Summary (Last 24 hours) at 10/16/2021 1002  Last data filed at 10/16/2021 0500  Gross per 24 hour   Intake --   Output 4250 ml   Net -4250 ml     Output by Drain (mL) 10/14/21 0700 - 10/14/21 1859 10/14/21 1900 - 10/15/21 0659 10/15/21 0700 - 10/15/21 1859 10/15/21 1900 - 10/16/21 0659 10/16/21 0700 - 10/16/21 1002   Patient has no LDAs of requested type attached.       DIET ORDERS (From admission to next 24h)     Start     Ordered    10/13/21 1427  Restrict to: Sips with Medications  ALL MEALS        Question:  Restrict to:  Answer:  Sips with Medications    10/13/21 1426                        Physical Exam  Nursing note reviewed.   Constitutional:     Alert, oriented x 4.NAD    Cardiovascular:      Rate and Rhythm: Normal rate and regular rhythm. Extremities warm, well perfused no edema.   Pulmonary:      Effort: Pulmonary effort is normal.      Breath sounds: Normal breath sounds. No wheezes or stridor  Abdominal:      General: Abdomen is soft,  non-tender. Ostomy viable/productive 1L/24hr          Recent Labs     10/14/21  0015 10/15/21  0005 10/16/21  0050   SODIUM 145 147* 151*   POTASSIUM 4.3 3.7 3.1*   CHLORIDE 107 109 109   CO2 23 27 26   BUN 64* 60* 49*   CREATININE 2.34* 1.85* 1.13   MAGNESIUM 2.0  2.1 2.2   PHOSPHORUS 5.0* 3.7 3.0   CALCIUM 9.0 9.2 9.4       Recent Labs     10/14/21  0015 10/15/21  0005 10/16/21  0050   GLUCOSE 95 78 74       Recent Labs     10/14/21  0015 10/14/21  0015 10/15/21  0005 10/15/21  0645 10/16/21  0050   RBC 3.84*  --  3.17*  --  3.17*   HEMOGLOBIN 10.7*   < > 8.6* 8.8* 8.6*   HEMATOCRIT 34.6*   < > 28.4* 29.3* 28.4*   PLATELETCT 311  --  251  --  224    < > = values in this interval not displayed.       Recent Labs     10/14/21  0015 10/15/21  0005 10/16/21  0050   WBC 16.4* 12.1* 9.2   NEUTSPOLYS 92.20* 88.20* 86.20*   LYMPHOCYTES 1.70* 4.10* 5.90*   MONOCYTES 2.60 6.30 5.90   EOSINOPHILS 0.00 0.20 0.40   BASOPHILS 0.00 0.40 0.30         ________________________________________________________________________      Patient Active Problem List   Diagnosis   • Malignant neoplasm of rectum (HCC)   • Anemia   • Abnormal EKG   • Thyroid nodule   • Secondary malignant neoplasm of liver (HCC)   • Dehydration   • Metastatic colon cancer in female (HCC)   • History of pulmonary embolus (PE)   • Ileitis   • Hyperlipidemia   • Shortness of breath   • Routine general medical examination at a health care facility   • Hyperglycemia   • Chemotherapy-induced neutropenia (HCC)   • Elevated alkaline phosphatase level   • Restless legs   • Incomplete rectal prolapse   • High risk medication use   • Anxiety   • Falls   • Port-A-Cath in place   • CKD (chronic kidney disease) stage 3, GFR 30-59 ml/min (HCC)   • Hemorrhagic disorder due to circulating anticoagulants (HCC)   • BRIONNA (acute kidney injury) (HCC)   • Hyponatremia   • Aspiration pneumonia (HCC)   • Cough with sputum   • Hepatic flexure mass   • Sepsis (HCC)   • Small bowel obstruction (HCC)   • Metabolic acidosis, increased anion gap (IAG)   • Metastatic colorectal cancer (HCC)       Acute Issues/Plan:  D/c NGT  Start sips, reglan if needed  PT/OT  +heparin proph

## 2021-10-16 NOTE — PROGRESS NOTES
[facility] Medicine Daily Progress Note    Date of Service  10/16/2021    Chief Complaint  Karlene Walters is a 81 y.o. female admitted 10/10/2021 with small bowel obstruction, nausea, vomiting    Hospital Course  No notes on file    Ms. Karlene Walters is a 81 y.o. 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  He did improve his showed small bowel obstruction.  General surgery was consulted with NG tube placement and bowel rest.  Status post lysis of adhesions and repair of enterotomy on 10/13.  NG tube was discontinued on 10/16 and diet was advanced.      Interval Problem Update  Patient was seen and examined at bedside.  I have personally reviewed and interpreted vitals, labs, and imaging.    10/12.  Afebrile.  Has been tachycardic.  On 3-4 liters nasal cannula.  Denies fever, chills, chest pains, shortness of breath.  She does report some abdominal pain.  No ostomy output and minimal urine output.  With BRIONNA, high anion gap acidosis I did start an IV fluids.  Abdomen is less distended and pain is improved.  She does request something for sleep.  Discussed with general surgery, oncology  10/13.  Afebrile.  Persistently tachycardic.  On 4 L nasal cannula.  Leukocytosis trending down.  Procalcitonin remains elevated.  Patient still with minimal urine output through pure wick.  Bladder scan shows 324 mL.  Kidney function remains elevated although anion gap acidosis is improved.  Continue ceftriaxone, metronidazole.  Increase maintenance fluids.  Denies fever, chills, chest pains, shortness of breath.  Still having significant abdominal pain with output from NG tube.  She is agreeable for surgery today.  10/14.  Afebrile.  Has been tachycardic and hypotensive.  3-6 L nasal cannula.  Hemoglobin dropped from 12-10 after  surgery yesterday.  Monitor leukocytosis.  Kidney function trending up to 2.34.  Denies fever, chills, chest pains, shortness of breath.  Abdominal pain is improved and less distention.  NG tube still to suction.  Hold off on diet until return of bowel function per surgery.  10/15.  Afebrile.  Hypotension is improved.  Still tachycardic.  On 3 L nasal cannula.  Leukocytosis trending down.  Drop in hemoglobin since surgery.  Continue to monitor.  Kidney function is improved.  Some hypernatremia now on LR.  Denies fever, chills, chest pains, shortness of breath.  Abdominal pain is improved.  Having output through ostomy as well as NG tube.  Adequate urine output and improvement in kidney function.  Per surgery okay to have ice chips, meds with sips.  Advance diet per surgery.  Complete 3 days of ceftriaxone metronidazole per surgery  10/16.  Afebrile.  Has been tachycardic, tachypneic.  Episode of hypotension.  On 3-6 L nasal cannula.  Leukocytosis is trending down however repeat procalcitonin is elevated at 7.64.  We will continue to monitor response to antibiotics.  Kidney function is improving with creatinine 1.13.  Worsening hypernatremia at 151 with hypokalemia at 3.1.  Denies fever, chills, chest pains, shortness of breath.  Abdominal pain and distention are improved.  Patient has output through ostomy as well as NG tube.  Okay to discontinue NG tube and start clear liquids per surgery.    I have personally seen and examined the patient at bedside. I discussed the plan of care with patient.  Nurse, social work    Consultants/Specialty  general surgery and oncology    Code Status  Full Code    Disposition  Patient is not medically cleared.   Anticipate discharge to to home with close outpatient follow-up.  I have placed the appropriate orders for post-discharge needs.    Review of Systems  Review of Systems   Constitutional: Negative for chills and fever.   HENT: Negative for congestion and sore throat.    Eyes:  Negative for blurred vision.   Respiratory: Negative for cough and shortness of breath.    Cardiovascular: Negative for chest pain, palpitations and leg swelling.   Gastrointestinal: Positive for abdominal pain, nausea and vomiting. Negative for constipation and diarrhea.   Genitourinary: Negative for dysuria, frequency and urgency.   Musculoskeletal: Negative for falls.   Skin: Negative for rash.   Neurological: Negative for dizziness, weakness and headaches.   Psychiatric/Behavioral: Negative for depression. The patient is nervous/anxious.    All other systems reviewed and are negative.       Physical Exam  Temp:  [35.9 °C (96.6 °F)-36.4 °C (97.6 °F)] 36.2 °C (97.1 °F)  Pulse:  [] 109  Resp:  [16-18] 16  BP: (120-133)/(70-76) 133/76  SpO2:  [94 %-100 %] 95 %    Physical Exam  Vitals and nursing note reviewed.   Constitutional:       Appearance: Normal appearance. She is ill-appearing.   HENT:      Head: Normocephalic and atraumatic.      Right Ear: External ear normal.      Left Ear: External ear normal.      Nose: Nose normal.      Comments: NG tube in place     Mouth/Throat:      Mouth: Mucous membranes are moist.      Pharynx: Oropharynx is clear. No oropharyngeal exudate or posterior oropharyngeal erythema.   Eyes:      Extraocular Movements: Extraocular movements intact.      Conjunctiva/sclera: Conjunctivae normal.   Cardiovascular:      Rate and Rhythm: Regular rhythm. Tachycardia present.      Pulses: Normal pulses.      Heart sounds: Normal heart sounds. No murmur heard.     Pulmonary:      Effort: Pulmonary effort is normal. No respiratory distress.      Breath sounds: No stridor. Rhonchi present. No wheezing or rales.   Abdominal:      General: Bowel sounds are decreased. There is distension.      Palpations: There is no mass.      Tenderness: There is abdominal tenderness.      Comments: Ostomy in place   Musculoskeletal:      Cervical back: Normal range of motion.   Skin:     General: Skin is  warm.      Capillary Refill: Capillary refill takes less than 2 seconds.   Neurological:      General: No focal deficit present.      Mental Status: She is alert and oriented to person, place, and time. Mental status is at baseline.      Cranial Nerves: No cranial nerve deficit.   Psychiatric:         Mood and Affect: Mood is anxious.         Behavior: Behavior normal.         Fluids    Intake/Output Summary (Last 24 hours) at 10/16/2021 0740  Last data filed at 10/16/2021 0500  Gross per 24 hour   Intake --   Output 4250 ml   Net -4250 ml       Laboratory  Recent Labs     10/14/21  0015 10/14/21  0015 10/15/21  0005 10/15/21  0645 10/16/21  0050   WBC 16.4*  --  12.1*  --  9.2   RBC 3.84*  --  3.17*  --  3.17*   HEMOGLOBIN 10.7*   < > 8.6* 8.8* 8.6*   HEMATOCRIT 34.6*   < > 28.4* 29.3* 28.4*   MCV 90.1  --  89.6  --  89.6   MCH 27.9  --  27.1  --  27.1   MCHC 30.9*  --  30.3*  --  30.3*   RDW 65.5*  --  64.9*  --  67.1*   PLATELETCT 311  --  251  --  224   MPV 9.6  --  9.7  --  9.4    < > = values in this interval not displayed.     Recent Labs     10/14/21  0015 10/15/21  0005 10/16/21  0050   SODIUM 145 147* 151*   POTASSIUM 4.3 3.7 3.1*   CHLORIDE 107 109 109   CO2 23 27 26   GLUCOSE 95 78 74   BUN 64* 60* 49*   CREATININE 2.34* 1.85* 1.13   CALCIUM 9.0 9.2 9.4                   Imaging  DX-ABDOMEN FOR TUBE PLACEMENT   Final Result      Enteric tube tip projects over the stomach      CT-ABDOMEN-PELVIS WITH   Final Result      1.  Small bowel obstruction, with transition point is in the pelvis, 20-30 cm proximal to the ileostomy. This is most likely due to adhesions and less likely due to internal hernia.   2.  Posttreatment changes in the right hepatic dome, with a 10.1 cm multiloculated fluid collection at the site of prior ablation. Infected collection is unlikely in the absence of internal pockets of air, but can be considered in the appropriate    clinical settings.   3.  No definitive evidence of  abdominopelvic metastatic disease. Evaluation for liver metastases is difficult on this single phase study.   4.  Bronchiectasis/scarring in the lower lobes, right worse than left.      DX-CHEST-PORTABLE (1 VIEW)   Final Result      Right basilar atelectasis. No focal consolidation. No effusions.           Assessment/Plan  * Sepsis (HCC)- (present on admission)  Assessment & Plan  This is Sepsis Present on admission  SIRS criteria identified on my evaluation include: Tachycardia, with heart rate greater than 90 BPM, Tachypnea, with respirations greater than 20 per minute and Leukocytosis, with WBC greater than 12,000  Source is intra-abdominal.  On exploratory laparotomy showed perforation  Sepsis protocol initiated  Fluid resuscitation ordered per protocol  IV antibiotics as appropriate for source of sepsis  While organ dysfunction may be noted elsewhere in this problem list or in the chart, degree of organ dysfunction does not meet CMS criteria for severe sepsis.  Procalcitonin ordered and minimally elevated 0.55.  Blood cultures x2, sputum culture for cough with sputum production.  Flagyl and ceftriaxone as IV antibiotic.    Small bowel obstruction (HCC)- (present on admission)  Assessment & Plan  Supportive care  Continue maintenance fluids  Advance diet as tolerated however n.p.o. for active nausea and vomiting  General surgery consulted and recommends NG tube placement and evaluation by colleague in a.m.  CT abdomen pelvis consistent with small bowel obstruction as seen above in imaging  Analgesics for pain control  Antiemetics for nausea and vomiting  Status post surgery on 10/13 with lysis of adhesions and repair of enterotomy  Continue NG tube to suction.  Awaiting return of bowel function.  NG tube discontinued 10/16.  Started on clear liquids.    Metabolic acidosis, increased anion gap (IAG)- (present on admission)  Assessment & Plan  Acidosis resolved  Likely secondary lactic acidosis from SBO  Trend  lactic acid  Repeat CMP in a.m.  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  Patient does have sepsis and she also reports having a cough with sputum production and possibly has 2 sources for infection.  Sputum culture ordered and pending.  Consider IR consultation    Patient has history of rectal cancer metastasis to the liver status post partial hepatectomy and ablation.  Followed by oncology.  Continue antibiotics for SBO, perforation.  Monitor clinical course    Cough with sputum- (present on admission)  Assessment & Plan  Pep therapy  Chest x-ray reveals basilar atelectasis  Robitussin-DM as needed  Sputum culture ordered and pending    Hyponatremia- (present on admission)  Assessment & Plan  Resolved.  Continue to monitor on fluids    BRIONNA (acute kidney injury) (HCC)- (present on admission)  Assessment & Plan  Continue maintenance fluids  Urinalysis/urine sodium for fractional excretion of Na  Likely prerenal azotemia secondary to dehydration, sepsis also contributing  Avoid nephrotoxic agents  Kidneys appear normal on CT.    History of pulmonary embolus (PE)- (present on admission)  Assessment & Plan  Holding Xarelto due to BRIONNA   Continue heparin for now        VTE prophylaxis: heparin ppx    I have performed a physical exam and reviewed and updated ROS and Plan today (10/16/2021). In review of yesterday's note (10/15/2021), there are no changes except as documented above.

## 2021-10-16 NOTE — CARE PLAN
The patient is Watcher - Medium risk of patient condition declining or worsening    Shift Goals  Clinical Goals: Decreased pain  Patient Goals: Rest  Family Goals: No family present    Progress made toward(s) clinical / shift goals:  Patient stated pain in her abdomen. Patient advised on comfort measure- patient declined intervention. Patient was able to sleep throughout shift.    Patient is not progressing towards the following goals:

## 2021-10-16 NOTE — PROGRESS NOTES
Patients potassium 3.1. Paged on call. Notified on call Tawnya Sharma provider place one time order for 40 mEq oral potassium . Order read back, and placed.

## 2021-10-17 LAB
ANION GAP SERPL CALC-SCNC: 4 MMOL/L (ref 7–16)
BASOPHILS # BLD AUTO: 0.3 % (ref 0–1.8)
BASOPHILS # BLD: 0.02 K/UL (ref 0–0.12)
BUN SERPL-MCNC: 33 MG/DL (ref 8–22)
CALCIUM SERPL-MCNC: 9.1 MG/DL (ref 8.5–10.5)
CHLORIDE SERPL-SCNC: 112 MMOL/L (ref 96–112)
CO2 SERPL-SCNC: 34 MMOL/L (ref 20–33)
CREAT SERPL-MCNC: 0.85 MG/DL (ref 0.5–1.4)
EOSINOPHIL # BLD AUTO: 0.13 K/UL (ref 0–0.51)
EOSINOPHIL NFR BLD: 1.9 % (ref 0–6.9)
ERYTHROCYTE [DISTWIDTH] IN BLOOD BY AUTOMATED COUNT: 67.3 FL (ref 35.9–50)
GLUCOSE SERPL-MCNC: 167 MG/DL (ref 65–99)
HCT VFR BLD AUTO: 37.5 % (ref 37–47)
HGB BLD-MCNC: 11 G/DL (ref 12–16)
IMM GRANULOCYTES # BLD AUTO: 0.1 K/UL (ref 0–0.11)
IMM GRANULOCYTES NFR BLD AUTO: 1.5 % (ref 0–0.9)
LYMPHOCYTES # BLD AUTO: 0.43 K/UL (ref 1–4.8)
LYMPHOCYTES NFR BLD: 6.3 % (ref 22–41)
MAGNESIUM SERPL-MCNC: 1.9 MG/DL (ref 1.5–2.5)
MCH RBC QN AUTO: 26.6 PG (ref 27–33)
MCHC RBC AUTO-ENTMCNC: 29.3 G/DL (ref 33.6–35)
MCV RBC AUTO: 90.6 FL (ref 81.4–97.8)
MONOCYTES # BLD AUTO: 0.48 K/UL (ref 0–0.85)
MONOCYTES NFR BLD AUTO: 7 % (ref 0–13.4)
MORPHOLOGY BLD-IMP: NORMAL
NEUTROPHILS # BLD AUTO: 5.65 K/UL (ref 2–7.15)
NEUTROPHILS NFR BLD: 83 % (ref 44–72)
NRBC # BLD AUTO: 0 K/UL
NRBC BLD-RTO: 0 /100 WBC
OSMOLALITY SERPL: 316 MOSM/KG H2O (ref 278–298)
PHOSPHATE SERPL-MCNC: 1.9 MG/DL (ref 2.5–4.5)
PLATELET # BLD AUTO: 177 K/UL (ref 164–446)
PMV BLD AUTO: 9.5 FL (ref 9–12.9)
POTASSIUM SERPL-SCNC: 3.6 MMOL/L (ref 3.6–5.5)
RBC # BLD AUTO: 4.14 M/UL (ref 4.2–5.4)
SODIUM SERPL-SCNC: 150 MMOL/L (ref 135–145)
WBC # BLD AUTO: 6.8 K/UL (ref 4.8–10.8)

## 2021-10-17 PROCEDURE — 83735 ASSAY OF MAGNESIUM: CPT

## 2021-10-17 PROCEDURE — 80048 BASIC METABOLIC PNL TOTAL CA: CPT

## 2021-10-17 PROCEDURE — 84100 ASSAY OF PHOSPHORUS: CPT

## 2021-10-17 PROCEDURE — A9270 NON-COVERED ITEM OR SERVICE: HCPCS | Performed by: INTERNAL MEDICINE

## 2021-10-17 PROCEDURE — 83930 ASSAY OF BLOOD OSMOLALITY: CPT

## 2021-10-17 PROCEDURE — 85025 COMPLETE CBC W/AUTO DIFF WBC: CPT

## 2021-10-17 PROCEDURE — 700102 HCHG RX REV CODE 250 W/ 637 OVERRIDE(OP): Performed by: STUDENT IN AN ORGANIZED HEALTH CARE EDUCATION/TRAINING PROGRAM

## 2021-10-17 PROCEDURE — 700111 HCHG RX REV CODE 636 W/ 250 OVERRIDE (IP): Performed by: STUDENT IN AN ORGANIZED HEALTH CARE EDUCATION/TRAINING PROGRAM

## 2021-10-17 PROCEDURE — 770004 HCHG ROOM/CARE - ONCOLOGY PRIVATE *

## 2021-10-17 PROCEDURE — 94640 AIRWAY INHALATION TREATMENT: CPT

## 2021-10-17 PROCEDURE — 99232 SBSQ HOSP IP/OBS MODERATE 35: CPT | Performed by: INTERNAL MEDICINE

## 2021-10-17 PROCEDURE — A9270 NON-COVERED ITEM OR SERVICE: HCPCS | Performed by: STUDENT IN AN ORGANIZED HEALTH CARE EDUCATION/TRAINING PROGRAM

## 2021-10-17 PROCEDURE — 700102 HCHG RX REV CODE 250 W/ 637 OVERRIDE(OP): Performed by: INTERNAL MEDICINE

## 2021-10-17 PROCEDURE — 700101 HCHG RX REV CODE 250: Performed by: INTERNAL MEDICINE

## 2021-10-17 PROCEDURE — 306581 SET INFUSION,POWERLOC 20G X 1: Performed by: INTERNAL MEDICINE

## 2021-10-17 RX ORDER — DEXTROSE MONOHYDRATE, SODIUM CHLORIDE, AND POTASSIUM CHLORIDE 50; 1.49; 4.5 G/1000ML; G/1000ML; G/1000ML
INJECTION, SOLUTION INTRAVENOUS CONTINUOUS
Status: DISCONTINUED | OUTPATIENT
Start: 2021-10-17 | End: 2021-10-18

## 2021-10-17 RX ORDER — GAUZE BANDAGE 2" X 2"
100 BANDAGE TOPICAL DAILY
Status: DISCONTINUED | OUTPATIENT
Start: 2021-10-17 | End: 2021-10-22 | Stop reason: HOSPADM

## 2021-10-17 RX ADMIN — HEPARIN SODIUM 5000 UNITS: 5000 INJECTION, SOLUTION INTRAVENOUS; SUBCUTANEOUS at 21:19

## 2021-10-17 RX ADMIN — DIBASIC SODIUM PHOSPHATE, MONOBASIC POTASSIUM PHOSPHATE AND MONOBASIC SODIUM PHOSPHATE 500 MG: 852; 155; 130 TABLET ORAL at 09:37

## 2021-10-17 RX ADMIN — HEPARIN SODIUM 5000 UNITS: 5000 INJECTION, SOLUTION INTRAVENOUS; SUBCUTANEOUS at 06:11

## 2021-10-17 RX ADMIN — METRONIDAZOLE 500 MG: 500 INJECTION, SOLUTION INTRAVENOUS at 06:11

## 2021-10-17 RX ADMIN — OXYCODONE 5 MG: 5 TABLET ORAL at 10:46

## 2021-10-17 RX ADMIN — HEPARIN SODIUM 5000 UNITS: 5000 INJECTION, SOLUTION INTRAVENOUS; SUBCUTANEOUS at 15:14

## 2021-10-17 RX ADMIN — Medication 400 MG: at 09:37

## 2021-10-17 RX ADMIN — OXYCODONE 5 MG: 5 TABLET ORAL at 18:57

## 2021-10-17 RX ADMIN — POTASSIUM CHLORIDE, DEXTROSE MONOHYDRATE AND SODIUM CHLORIDE: 150; 5; 450 INJECTION, SOLUTION INTRAVENOUS at 21:19

## 2021-10-17 RX ADMIN — THIAMINE HCL TAB 100 MG 100 MG: 100 TAB at 09:37

## 2021-10-17 RX ADMIN — DIBASIC SODIUM PHOSPHATE, MONOBASIC POTASSIUM PHOSPHATE AND MONOBASIC SODIUM PHOSPHATE 500 MG: 852; 155; 130 TABLET ORAL at 12:50

## 2021-10-17 RX ADMIN — POTASSIUM CHLORIDE, DEXTROSE MONOHYDRATE AND SODIUM CHLORIDE: 150; 5; 450 INJECTION, SOLUTION INTRAVENOUS at 12:50

## 2021-10-17 RX ADMIN — ALBUTEROL SULFATE 2.5 MG: 2.5 SOLUTION RESPIRATORY (INHALATION) at 20:36

## 2021-10-17 ASSESSMENT — ENCOUNTER SYMPTOMS
DEPRESSION: 0
CHILLS: 0
VOMITING: 1
PALPITATIONS: 0
COUGH: 0
BLURRED VISION: 0
FALLS: 0
HEADACHES: 0
SORE THROAT: 0
DIZZINESS: 0
CONSTIPATION: 0
DIARRHEA: 0
FEVER: 0
ABDOMINAL PAIN: 1
WEAKNESS: 0
NAUSEA: 1
NERVOUS/ANXIOUS: 1
SHORTNESS OF BREATH: 0

## 2021-10-17 ASSESSMENT — PAIN DESCRIPTION - PAIN TYPE
TYPE: SURGICAL PAIN
TYPE: ACUTE PAIN
TYPE: SURGICAL PAIN

## 2021-10-17 NOTE — CARE PLAN
"\"COVID-19 surge in effect\"      The patient is Watcher - Medium risk of patient condition declining or worsening    Shift Goals  Clinical Goals: mobility; pain control  Patient Goals: rest; pain control  Family Goals: No family present    Progress made toward(s) clinical / shift goals: pain control; rest    Patient is not progressing towards the following goals: mobility      Problem: Knowledge Deficit - Standard  Goal: Patient and family/care givers will demonstrate understanding of plan of care, disease process/condition, diagnostic tests and medications  Outcome: Not Progressing     Problem: Skin Integrity  Goal: Skin integrity is maintained or improved  Outcome: Not Progressing  Note: Pt refusing Q2 turns to help maintain skin integrity; pt educated on the importance and understands.     Problem: Pain - Standard  Goal: Alleviation of pain or a reduction in pain to the patient’s comfort goal  Outcome: Progressing     "

## 2021-10-17 NOTE — PROGRESS NOTES
Progress Note    Author:  Sarah Rahman MD    Date & Time:   10/17/2021   12:14 PM          Patient ID:             Name:             Karlene Walters   YOB: 1940  Age:                 81 y.o.  female   MRN:               [id number]    ________________________________________________________________________  Acute Care Surgery Service       Events:  ----------   POD 4  Patient denies abdominal pain. Tolerating clears, but intake is minimal. Denies nausea  Exam:       Vitals:    10/17/21 0800   BP: 141/71   Pulse: 98   Resp: 17   Temp: 36.4 °C (97.5 °F)   SpO2: 95%     SpO2  Min: 89 %  Max: 100 %  O2 (LPM)  Min: 2  Max: 8  FiO2%  Min: 32 %  Max: 32 %  Temp  Min: 35.8 °C (96.5 °F)  Max: 37 °C (98.6 °F)    Intake/Output Summary (Last 24 hours) at 10/17/2021 1214  Last data filed at 10/17/2021 1000  Gross per 24 hour   Intake --   Output 2290 ml   Net -2290 ml     Output by Drain (mL) 10/15/21 0700 - 10/15/21 1859 10/15/21 1900 - 10/16/21 0659 10/16/21 0700 - 10/16/21 1859 10/16/21 1900 - 10/17/21 0659 10/17/21 0700 - 10/17/21 1214   Patient has no LDAs of requested type attached.       DIET ORDERS (From admission to next 24h)     Start     Ordered    10/16/21 1007  Diet Order Diet: Clear Liquid  ALL MEALS        Question:  Diet:  Answer:  Clear Liquid    10/16/21 1006                        Physical Exam  Nursing note reviewed.   Constitutional:       Appearance: NAD  Cardiovascular:      Rate and Rhythm: Normal rate and regular rhythm. Extremities warm, well perfused no edema.   Pulmonary:      Effort: Pulmonary effort is normal.      Breath sounds: Normal breath sounds. No wheezes or stridor  Abdominal:      General: Abdomen is soft,  Mildly distended  Ostomy w/ entericus/gas in bag    Midline incision intact, dressing soaked with ostomy bag contents           Recent Labs     10/15/21  0005 10/16/21  0050 10/17/21  0332   SODIUM 147* 151* 150*   POTASSIUM 3.7 3.1* 3.6   CHLORIDE 109 109 112   CO2 27 26  34*   BUN 60* 49* 33*   CREATININE 1.85* 1.13 0.85   MAGNESIUM 2.1 2.2 1.9   PHOSPHORUS 3.7 3.0 1.9*   CALCIUM 9.2 9.4 9.1       Recent Labs     10/15/21  0005 10/16/21  0050 10/17/21  0332   GLUCOSE 78 74 167*       Recent Labs     10/15/21  0005 10/15/21  0005 10/15/21  0645 10/16/21  0050 10/17/21  0332   RBC 3.17*  --   --  3.17* 4.14*   HEMOGLOBIN 8.6*   < > 8.8* 8.6* 11.0*   HEMATOCRIT 28.4*   < > 29.3* 28.4* 37.5   PLATELETCT 251  --   --  224 177    < > = values in this interval not displayed.       Recent Labs     10/15/21  0005 10/16/21  0050 10/17/21  0332   WBC 12.1* 9.2 6.8   NEUTSPOLYS 88.20* 86.20* 83.00*   LYMPHOCYTES 4.10* 5.90* 6.30*   MONOCYTES 6.30 5.90 7.00   EOSINOPHILS 0.20 0.40 1.90   BASOPHILS 0.40 0.30 0.30         ________________________________________________________________________      Patient Active Problem List   Diagnosis   • Malignant neoplasm of rectum (HCC)   • Anemia   • Abnormal EKG   • Thyroid nodule   • Secondary malignant neoplasm of liver (HCC)   • Dehydration   • Metastatic colon cancer in female (HCC)   • History of pulmonary embolus (PE)   • Ileitis   • Hyperlipidemia   • Shortness of breath   • Routine general medical examination at a health care facility   • Hyperglycemia   • Chemotherapy-induced neutropenia (HCC)   • Elevated alkaline phosphatase level   • Restless legs   • Incomplete rectal prolapse   • High risk medication use   • Anxiety   • Falls   • Port-A-Cath in place   • CKD (chronic kidney disease) stage 3, GFR 30-59 ml/min (HCC)   • Hemorrhagic disorder due to circulating anticoagulants (HCC)   • BRIONNA (acute kidney injury) (HCC)   • Hyponatremia   • Aspiration pneumonia (HCC)   • Cough with sputum   • Hepatic flexure mass   • Sepsis (HCC)   • Small bowel obstruction (HCC)   • Metabolic acidosis, increased anion gap (IAG)   • Metastatic colorectal cancer (HCC)       Acute Issues/Plan:  Advance to GI soft diet  Ostomy nurse called to assist with preventing  further leakage onto the incision  Monitor incision for infection    PT/OT

## 2021-10-17 NOTE — PROGRESS NOTES
[facility] Medicine Daily Progress Note    Date of Service  10/17/2021    Chief Complaint  Karlene Walters is a 81 y.o. female admitted 10/10/2021 with small bowel obstruction, nausea, vomiting    Hospital Course  No notes on file    Ms. Karlene Walters is a 81 y.o. 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  He did improve his showed small bowel obstruction.  General surgery was consulted with NG tube placement and bowel rest.  Status post lysis of adhesions and repair of enterotomy on 10/13.  NG tube was discontinued on 10/16 and diet was advanced.      Interval Problem Update  Patient was seen and examined at bedside.  I have personally reviewed and interpreted vitals, labs, and imaging.    10/12.  Afebrile.  Has been tachycardic.  On 3-4 liters nasal cannula.  Denies fever, chills, chest pains, shortness of breath.  She does report some abdominal pain.  No ostomy output and minimal urine output.  With BRIONNA, high anion gap acidosis I did start an IV fluids.  Abdomen is less distended and pain is improved.  She does request something for sleep.  Discussed with general surgery, oncology  10/13.  Afebrile.  Persistently tachycardic.  On 4 L nasal cannula.  Leukocytosis trending down.  Procalcitonin remains elevated.  Patient still with minimal urine output through pure wick.  Bladder scan shows 324 mL.  Kidney function remains elevated although anion gap acidosis is improved.  Continue ceftriaxone, metronidazole.  Increase maintenance fluids.  Denies fever, chills, chest pains, shortness of breath.  Still having significant abdominal pain with output from NG tube.  She is agreeable for surgery today.  10/14.  Afebrile.  Has been tachycardic and hypotensive.  3-6 L nasal cannula.  Hemoglobin dropped from 12-10 after  surgery yesterday.  Monitor leukocytosis.  Kidney function trending up to 2.34.  Denies fever, chills, chest pains, shortness of breath.  Abdominal pain is improved and less distention.  NG tube still to suction.  Hold off on diet until return of bowel function per surgery.  10/15.  Afebrile.  Hypotension is improved.  Still tachycardic.  On 3 L nasal cannula.  Leukocytosis trending down.  Drop in hemoglobin since surgery.  Continue to monitor.  Kidney function is improved.  Some hypernatremia now on LR.  Denies fever, chills, chest pains, shortness of breath.  Abdominal pain is improved.  Having output through ostomy as well as NG tube.  Adequate urine output and improvement in kidney function.  Per surgery okay to have ice chips, meds with sips.  Advance diet per surgery.  Complete 3 days of ceftriaxone metronidazole per surgery  10/16.  Afebrile.  Has been tachycardic, tachypneic.  Episode of hypotension.  On 3-6 L nasal cannula.  Leukocytosis is trending down however repeat procalcitonin is elevated at 7.64.  We will continue to monitor response to antibiotics.  Kidney function is improving with creatinine 1.13.  Worsening hypernatremia at 151 with hypokalemia at 3.1.  Denies fever, chills, chest pains, shortness of breath.  Abdominal pain and distention are improved.  Patient has output through ostomy as well as NG tube.  Okay to discontinue NG tube and start clear liquids per surgery.  10/17.  Afebrile.  Has been tachycardic.  On 3 L nasal cannula.  Replete magnesium, potassium, phosphorus.  Concern for refeeding syndrome.  Started on thiamine.  Patient remains hypernatremic.  Switch IV fluids to D5 1/2 NS.  Kidney function much improved but patient now becoming alkalotic.  Continue to monitor.  Has completed antibiotics for 3 days for SBO/perforation per surgery.  Elevated procalcitonin but no leukocytosis.  Denies fevers, chills, chest pains.  Reports persistent abdominal pain.  This morning there is  serosanguineous drainage from surgical incision.  There is also leaking from ostomy bag onto the surgical incision.  Ostomy nurse was called.  Okay to advance diet per surgery.    I have personally seen and examined the patient at bedside. I discussed the plan of care with patient.  Nurse, social work    Consultants/Specialty  general surgery and oncology    Code Status  Full Code    Disposition  Patient is not medically cleared.   Anticipate discharge to to home with close outpatient follow-up.  I have placed the appropriate orders for post-discharge needs.    Review of Systems  Review of Systems   Constitutional: Negative for chills and fever.   HENT: Negative for congestion and sore throat.    Eyes: Negative for blurred vision.   Respiratory: Negative for cough and shortness of breath.    Cardiovascular: Negative for chest pain, palpitations and leg swelling.   Gastrointestinal: Positive for abdominal pain, nausea and vomiting. Negative for constipation and diarrhea.   Genitourinary: Negative for dysuria, frequency and urgency.   Musculoskeletal: Negative for falls.   Skin: Negative for rash.   Neurological: Negative for dizziness, weakness and headaches.   Psychiatric/Behavioral: Negative for depression. The patient is nervous/anxious.    All other systems reviewed and are negative.       Physical Exam  Temp:  [35.9 °C (96.7 °F)-36.4 °C (97.5 °F)] 36.4 °C (97.5 °F)  Pulse:  [] 98  Resp:  [16-18] 17  BP: (128-143)/(66-91) 141/71  SpO2:  [94 %-99 %] 95 %    Physical Exam  Vitals and nursing note reviewed.   Constitutional:       Appearance: Normal appearance. She is ill-appearing.   HENT:      Head: Normocephalic and atraumatic.      Right Ear: External ear normal.      Left Ear: External ear normal.      Nose: Nose normal.      Mouth/Throat:      Mouth: Mucous membranes are moist.      Pharynx: Oropharynx is clear. No oropharyngeal exudate or posterior oropharyngeal erythema.   Eyes:      Extraocular  Movements: Extraocular movements intact.      Conjunctiva/sclera: Conjunctivae normal.   Cardiovascular:      Rate and Rhythm: Regular rhythm. Tachycardia present.      Pulses: Normal pulses.      Heart sounds: Normal heart sounds. No murmur heard.     Pulmonary:      Effort: Pulmonary effort is normal. No respiratory distress.      Breath sounds: No stridor. Rhonchi present. No wheezing or rales.   Abdominal:      General: Bowel sounds are decreased. There is distension.      Palpations: There is no mass.      Tenderness: There is abdominal tenderness.      Comments: Ostomy in place   Musculoskeletal:      Cervical back: Normal range of motion.   Skin:     General: Skin is warm.      Capillary Refill: Capillary refill takes less than 2 seconds.   Neurological:      General: No focal deficit present.      Mental Status: She is alert and oriented to person, place, and time. Mental status is at baseline.      Cranial Nerves: No cranial nerve deficit.   Psychiatric:         Mood and Affect: Mood is anxious.         Behavior: Behavior normal.         Fluids    Intake/Output Summary (Last 24 hours) at 10/17/2021 0817  Last data filed at 10/17/2021 0613  Gross per 24 hour   Intake --   Output 2040 ml   Net -2040 ml       Laboratory  Recent Labs     10/15/21  0005 10/15/21  0005 10/15/21  0645 10/16/21  0050 10/17/21  0332   WBC 12.1*  --   --  9.2 6.8   RBC 3.17*  --   --  3.17* 4.14*   HEMOGLOBIN 8.6*   < > 8.8* 8.6* 11.0*   HEMATOCRIT 28.4*   < > 29.3* 28.4* 37.5   MCV 89.6  --   --  89.6 90.6   MCH 27.1  --   --  27.1 26.6*   MCHC 30.3*  --   --  30.3* 29.3*   RDW 64.9*  --   --  67.1* 67.3*   PLATELETCT 251  --   --  224 177   MPV 9.7  --   --  9.4 9.5    < > = values in this interval not displayed.     Recent Labs     10/15/21  0005 10/16/21  0050 10/17/21  0332   SODIUM 147* 151* 150*   POTASSIUM 3.7 3.1* 3.6   CHLORIDE 109 109 112   CO2 27 26 34*   GLUCOSE 78 74 167*   BUN 60* 49* 33*   CREATININE 1.85* 1.13 0.85    CALCIUM 9.2 9.4 9.1                   Imaging  DX-ABDOMEN FOR TUBE PLACEMENT   Final Result      Enteric tube tip projects over the stomach      CT-ABDOMEN-PELVIS WITH   Final Result      1.  Small bowel obstruction, with transition point is in the pelvis, 20-30 cm proximal to the ileostomy. This is most likely due to adhesions and less likely due to internal hernia.   2.  Posttreatment changes in the right hepatic dome, with a 10.1 cm multiloculated fluid collection at the site of prior ablation. Infected collection is unlikely in the absence of internal pockets of air, but can be considered in the appropriate    clinical settings.   3.  No definitive evidence of abdominopelvic metastatic disease. Evaluation for liver metastases is difficult on this single phase study.   4.  Bronchiectasis/scarring in the lower lobes, right worse than left.      DX-CHEST-PORTABLE (1 VIEW)   Final Result      Right basilar atelectasis. No focal consolidation. No effusions.           Assessment/Plan  * Small bowel obstruction (HCC)- (present on admission)  Assessment & Plan  Supportive care  Continue maintenance fluids  Advance diet as tolerated however n.p.o. for active nausea and vomiting  General surgery consulted and recommends NG tube placement and evaluation by colleague in a.m.  CT abdomen pelvis consistent with small bowel obstruction as seen above in imaging  Analgesics for pain control  Antiemetics for nausea and vomiting  Status post surgery on 10/13 with lysis of adhesions and repair of enterotomy  Continue NG tube to suction.  Awaiting return of bowel function.  NG tube discontinued 10/16.  Started on clear liquids.  Advanced to soft diet 10/17    Sepsis (HCC)- (present on admission)  Assessment & Plan  This is Sepsis Present on admission  SIRS criteria identified on my evaluation include: Tachycardia, with heart rate greater than 90 BPM, Tachypnea, with respirations greater than 20 per minute and Leukocytosis, with  WBC greater than 12,000  Source is intra-abdominal.  On exploratory laparotomy showed perforation  Sepsis protocol initiated  Fluid resuscitation ordered per protocol  IV antibiotics as appropriate for source of sepsis  While organ dysfunction may be noted elsewhere in this problem list or in the chart, degree of organ dysfunction does not meet CMS criteria for severe sepsis.  Procalcitonin ordered and minimally elevated 0.55.  Blood cultures x2, sputum culture for cough with sputum production.  Flagyl and ceftriaxone as IV antibiotic.    Resolved  Has been tachycardic.  Completed 3 days of Ceftriaxone, Metronidazole for SBO, perforation per surgery.  Concern for ostomy leaking onto surgical incision.  Continue to monitor for sources of infection    Metabolic acidosis, increased anion gap (IAG)- (present on admission)  Assessment & Plan  Acidosis resolved  Likely secondary lactic acidosis from SBO  Trend lactic acid  Repeat CMP in a.m.  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  Patient does have sepsis and she also reports having a cough with sputum production and possibly has 2 sources for infection.  Sputum culture ordered and pending.  Consider IR consultation    Patient has history of rectal cancer metastasis to the liver status post partial hepatectomy and ablation.  Followed by oncology.  Continue antibiotics for SBO, perforation.  Monitor clinical course    Cough with sputum- (present on admission)  Assessment & Plan  Pep therapy  Chest x-ray reveals basilar atelectasis  Robitussin-DM as needed  Sputum culture ordered and pending    Hyponatremia- (present on admission)  Assessment &  Plan  Resolved.   Now hypernatremia.  Continue D5 half-normal saline with potassium    BRIONNA (acute kidney injury) (HCC)- (present on admission)  Assessment & Plan  Continue maintenance fluids  Urinalysis/urine sodium for fractional excretion of Na  Likely prerenal azotemia secondary to dehydration, sepsis also contributing  Avoid nephrotoxic agents  Kidneys appear normal on CT.    History of pulmonary embolus (PE)- (present on admission)  Assessment & Plan  Holding Xarelto due to BRIONNA   Continue heparin for now        VTE prophylaxis: heparin ppx    I have performed a physical exam and reviewed and updated ROS and Plan today (10/17/2021). In review of yesterday's note (10/16/2021), there are no changes except as documented above.

## 2021-10-18 ENCOUNTER — APPOINTMENT (OUTPATIENT)
Dept: ONCOLOGY | Facility: MEDICAL CENTER | Age: 81
End: 2021-10-18
Attending: INTERNAL MEDICINE
Payer: MEDICARE

## 2021-10-18 LAB
ANION GAP SERPL CALC-SCNC: 5 MMOL/L (ref 7–16)
ANISOCYTOSIS BLD QL SMEAR: ABNORMAL
BASOPHILS # BLD AUTO: 0 % (ref 0–1.8)
BASOPHILS # BLD: 0 K/UL (ref 0–0.12)
BUN SERPL-MCNC: 22 MG/DL (ref 8–22)
CALCIUM SERPL-MCNC: 8.7 MG/DL (ref 8.5–10.5)
CHLORIDE SERPL-SCNC: 107 MMOL/L (ref 96–112)
CO2 SERPL-SCNC: 32 MMOL/L (ref 20–33)
CREAT SERPL-MCNC: 0.67 MG/DL (ref 0.5–1.4)
EOSINOPHIL # BLD AUTO: 0.08 K/UL (ref 0–0.51)
EOSINOPHIL NFR BLD: 0.9 % (ref 0–6.9)
ERYTHROCYTE [DISTWIDTH] IN BLOOD BY AUTOMATED COUNT: 67.2 FL (ref 35.9–50)
GLUCOSE SERPL-MCNC: 170 MG/DL (ref 65–99)
HCT VFR BLD AUTO: 32.5 % (ref 37–47)
HGB BLD-MCNC: 9.6 G/DL (ref 12–16)
LYMPHOCYTES # BLD AUTO: 0.98 K/UL (ref 1–4.8)
LYMPHOCYTES NFR BLD: 10.5 % (ref 22–41)
MAGNESIUM SERPL-MCNC: 1.7 MG/DL (ref 1.5–2.5)
MANUAL DIFF BLD: NORMAL
MCH RBC QN AUTO: 27 PG (ref 27–33)
MCHC RBC AUTO-ENTMCNC: 29.5 G/DL (ref 33.6–35)
MCV RBC AUTO: 91.3 FL (ref 81.4–97.8)
METAMYELOCYTES NFR BLD MANUAL: 0.9 %
MICROCYTES BLD QL SMEAR: ABNORMAL
MONOCYTES # BLD AUTO: 0.33 K/UL (ref 0–0.85)
MONOCYTES NFR BLD AUTO: 3.5 % (ref 0–13.4)
MORPHOLOGY BLD-IMP: NORMAL
NEUTROPHILS # BLD AUTO: 7.83 K/UL (ref 2–7.15)
NEUTROPHILS NFR BLD: 84.2 % (ref 44–72)
NRBC # BLD AUTO: 0 K/UL
NRBC BLD-RTO: 0 /100 WBC
PHOSPHATE SERPL-MCNC: 1.6 MG/DL (ref 2.5–4.5)
PLATELET # BLD AUTO: 228 K/UL (ref 164–446)
PLATELET BLD QL SMEAR: NORMAL
PMV BLD AUTO: 10 FL (ref 9–12.9)
POTASSIUM SERPL-SCNC: 3.9 MMOL/L (ref 3.6–5.5)
RBC # BLD AUTO: 3.56 M/UL (ref 4.2–5.4)
RBC BLD AUTO: PRESENT
SODIUM SERPL-SCNC: 144 MMOL/L (ref 135–145)
WBC # BLD AUTO: 9.3 K/UL (ref 4.8–10.8)

## 2021-10-18 PROCEDURE — 99024 POSTOP FOLLOW-UP VISIT: CPT | Performed by: SURGERY

## 2021-10-18 PROCEDURE — 700111 HCHG RX REV CODE 636 W/ 250 OVERRIDE (IP): Performed by: STUDENT IN AN ORGANIZED HEALTH CARE EDUCATION/TRAINING PROGRAM

## 2021-10-18 PROCEDURE — 97530 THERAPEUTIC ACTIVITIES: CPT

## 2021-10-18 PROCEDURE — 770004 HCHG ROOM/CARE - ONCOLOGY PRIVATE *

## 2021-10-18 PROCEDURE — 85027 COMPLETE CBC AUTOMATED: CPT

## 2021-10-18 PROCEDURE — 84100 ASSAY OF PHOSPHORUS: CPT

## 2021-10-18 PROCEDURE — 94760 N-INVAS EAR/PLS OXIMETRY 1: CPT

## 2021-10-18 PROCEDURE — 302097 BARRIER RING,CONVEX 40MM: Performed by: INTERNAL MEDICINE

## 2021-10-18 PROCEDURE — 85007 BL SMEAR W/DIFF WBC COUNT: CPT

## 2021-10-18 PROCEDURE — 700105 HCHG RX REV CODE 258: Performed by: INTERNAL MEDICINE

## 2021-10-18 PROCEDURE — 94640 AIRWAY INHALATION TREATMENT: CPT

## 2021-10-18 PROCEDURE — 97164 PT RE-EVAL EST PLAN CARE: CPT

## 2021-10-18 PROCEDURE — 99232 SBSQ HOSP IP/OBS MODERATE 35: CPT | Performed by: INTERNAL MEDICINE

## 2021-10-18 PROCEDURE — 83735 ASSAY OF MAGNESIUM: CPT

## 2021-10-18 PROCEDURE — A9270 NON-COVERED ITEM OR SERVICE: HCPCS | Performed by: STUDENT IN AN ORGANIZED HEALTH CARE EDUCATION/TRAINING PROGRAM

## 2021-10-18 PROCEDURE — 700102 HCHG RX REV CODE 250 W/ 637 OVERRIDE(OP): Performed by: STUDENT IN AN ORGANIZED HEALTH CARE EDUCATION/TRAINING PROGRAM

## 2021-10-18 PROCEDURE — 700101 HCHG RX REV CODE 250: Performed by: INTERNAL MEDICINE

## 2021-10-18 PROCEDURE — A9270 NON-COVERED ITEM OR SERVICE: HCPCS | Performed by: INTERNAL MEDICINE

## 2021-10-18 PROCEDURE — 302098 PASTE RING (FLAT): Performed by: INTERNAL MEDICINE

## 2021-10-18 PROCEDURE — 700102 HCHG RX REV CODE 250 W/ 637 OVERRIDE(OP): Performed by: INTERNAL MEDICINE

## 2021-10-18 PROCEDURE — 80048 BASIC METABOLIC PNL TOTAL CA: CPT

## 2021-10-18 PROCEDURE — 700111 HCHG RX REV CODE 636 W/ 250 OVERRIDE (IP): Performed by: INTERNAL MEDICINE

## 2021-10-18 RX ORDER — DEXTROSE AND SODIUM CHLORIDE 5; .45 G/100ML; G/100ML
INJECTION, SOLUTION INTRAVENOUS CONTINUOUS
Status: DISCONTINUED | OUTPATIENT
Start: 2021-10-18 | End: 2021-10-19

## 2021-10-18 RX ORDER — DEXTROSE MONOHYDRATE, SODIUM CHLORIDE, AND POTASSIUM CHLORIDE 50; 1.49; 4.5 G/1000ML; G/1000ML; G/1000ML
INJECTION, SOLUTION INTRAVENOUS CONTINUOUS
Status: DISCONTINUED | OUTPATIENT
Start: 2021-10-18 | End: 2021-10-18

## 2021-10-18 RX ORDER — MAGNESIUM SULFATE HEPTAHYDRATE 40 MG/ML
2 INJECTION, SOLUTION INTRAVENOUS ONCE
Status: COMPLETED | OUTPATIENT
Start: 2021-10-18 | End: 2021-10-18

## 2021-10-18 RX ADMIN — OXYCODONE 5 MG: 5 TABLET ORAL at 06:24

## 2021-10-18 RX ADMIN — POTASSIUM PHOSPHATE, MONOBASIC AND POTASSIUM PHOSPHATE, DIBASIC 30 MMOL: 224; 236 INJECTION, SOLUTION, CONCENTRATE INTRAVENOUS at 09:26

## 2021-10-18 RX ADMIN — SODIUM CHLORIDE 4 ML: 7 NEBU SOLN,3 % NEBU at 08:11

## 2021-10-18 RX ADMIN — POTASSIUM CHLORIDE, DEXTROSE MONOHYDRATE AND SODIUM CHLORIDE: 150; 5; 450 INJECTION, SOLUTION INTRAVENOUS at 12:29

## 2021-10-18 RX ADMIN — POTASSIUM PHOSPHATE, MONOBASIC AND POTASSIUM PHOSPHATE, DIBASIC 30 MMOL: 224; 236 INJECTION, SOLUTION, CONCENTRATE INTRAVENOUS at 17:01

## 2021-10-18 RX ADMIN — MAGNESIUM SULFATE HEPTAHYDRATE 2 G: 40 INJECTION, SOLUTION INTRAVENOUS at 10:10

## 2021-10-18 RX ADMIN — DEXTROSE AND SODIUM CHLORIDE: 5; 450 INJECTION, SOLUTION INTRAVENOUS at 17:01

## 2021-10-18 RX ADMIN — POTASSIUM CHLORIDE, DEXTROSE MONOHYDRATE AND SODIUM CHLORIDE: 150; 5; 450 INJECTION, SOLUTION INTRAVENOUS at 04:44

## 2021-10-18 RX ADMIN — HEPARIN SODIUM 5000 UNITS: 5000 INJECTION, SOLUTION INTRAVENOUS; SUBCUTANEOUS at 21:59

## 2021-10-18 RX ADMIN — POTASSIUM CHLORIDE, DEXTROSE MONOHYDRATE AND SODIUM CHLORIDE: 150; 5; 450 INJECTION, SOLUTION INTRAVENOUS at 09:26

## 2021-10-18 RX ADMIN — HEPARIN SODIUM 5000 UNITS: 5000 INJECTION, SOLUTION INTRAVENOUS; SUBCUTANEOUS at 04:37

## 2021-10-18 RX ADMIN — HEPARIN SODIUM 5000 UNITS: 5000 INJECTION, SOLUTION INTRAVENOUS; SUBCUTANEOUS at 13:56

## 2021-10-18 RX ADMIN — ALBUTEROL SULFATE 2.5 MG: 2.5 SOLUTION RESPIRATORY (INHALATION) at 19:31

## 2021-10-18 RX ADMIN — ALBUTEROL SULFATE 2.5 MG: 2.5 SOLUTION RESPIRATORY (INHALATION) at 08:11

## 2021-10-18 ASSESSMENT — COGNITIVE AND FUNCTIONAL STATUS - GENERAL
MOVING FROM LYING ON BACK TO SITTING ON SIDE OF FLAT BED: UNABLE
CLIMB 3 TO 5 STEPS WITH RAILING: TOTAL
STANDING UP FROM CHAIR USING ARMS: A LOT
MOVING TO AND FROM BED TO CHAIR: UNABLE
MOBILITY SCORE: 7
SUGGESTED CMS G CODE MODIFIER MOBILITY: CM
WALKING IN HOSPITAL ROOM: TOTAL
TURNING FROM BACK TO SIDE WHILE IN FLAT BAD: UNABLE

## 2021-10-18 ASSESSMENT — ENCOUNTER SYMPTOMS
DEPRESSION: 0
DIARRHEA: 0
NERVOUS/ANXIOUS: 1
SHORTNESS OF BREATH: 0
PALPITATIONS: 0
COUGH: 0
BLURRED VISION: 0
VOMITING: 1
NAUSEA: 0
FEVER: 0
HEADACHES: 0
VOMITING: 0
CHILLS: 0
ABDOMINAL PAIN: 1
NAUSEA: 1
SORE THROAT: 0
DIZZINESS: 0
WEAKNESS: 0
CONSTIPATION: 0
FALLS: 0

## 2021-10-18 ASSESSMENT — GAIT ASSESSMENTS: GAIT LEVEL OF ASSIST: UNABLE TO PARTICIPATE

## 2021-10-18 ASSESSMENT — PAIN DESCRIPTION - PAIN TYPE
TYPE: ACUTE PAIN
TYPE: ACUTE PAIN

## 2021-10-18 NOTE — CARE PLAN
The patient is Stable - Low risk of patient condition declining or worsening    Shift Goals  Clinical Goals: Maintain skin integrity    Progress made toward(s) clinical / shift goals:   Pt education provided about the importance of shifting weight in bed and q2hr turns. Pt is declining q2hr turns at this time. Ostomy is having regular output.   Problem: Pain - Standard  Goal: Alleviation of pain or a reduction in pain to the patient’s comfort goal  Outcome: Progressing     Problem: Knowledge Deficit - Standard  Goal: Patient and family/care givers will demonstrate understanding of plan of care, disease process/condition, diagnostic tests and medications  Outcome: Progressing     Problem: Skin Integrity  Goal: Skin integrity is maintained or improved  Outcome: Progressing     Problem: Fall Risk  Goal: Patient will remain free from falls  Outcome: Progressing     Problem: Mobility  Goal: Patient's capacity to carry out activities will improve  Outcome: Progressing     Problem: Gastrointestinal Irritability  Goal: Nausea and vomiting will be absent or improve  Outcome: Progressing     COVID- 19 surge in effect

## 2021-10-18 NOTE — PROGRESS NOTES
Subjective:      Primary care physician:Manan Quiñonez M.D.  Referring Provider: Eric Hardy M.D.  Medical Oncologist: Eric Hardy M.D        Chief Complaint: No chief complaint on file.    Diagnosis:   1. Malignant neoplasm of rectum (HCC)     2. Secondary malignant neoplasm of liver (HCC)     3. Liver disease         History of presenting illness:  Karlene Walters  is a pleasant 81 y.o. year old female who presented with ***      Past Medical History:   Diagnosis Date   • Adverse effect of anesthesia     elevated BP postop   • Anesthesia    • Arrhythmia     occasional   • Blood clotting disorder (HCC) 08/2015    bilat PE    • Blood transfusion 1957   • Breath shortness     pt reports d/t chemo treatment; no O2; with moderate exertion; no c/o at this time   • Cancer (HCC) 09/2012    rectal cancer,  Liver CA-2015   • CATARACT     removed b/l   • Chemotherapy-induced neutropenia (HCC) 9/28/2016   • Chickenpox     history of   • Colon cancer (HCC)    • Dental disorder     dentures UPPERS AND LOWERS   • Elevated alkaline phosphatase level 11/15/2017   • Emphysema of lung (HCC)     COPD   • Fall    • [place] measles     history of   • Heart murmur     as a child   • Hemorrhagic disorder (HCC)     on Xarelto    • High cholesterol    • Hypercholesteremia    • Hypertension     no meds currently    • Ileostomy in place (HCC)    • Ileostomy in place (HCC)    • Indigestion    • Influenza     history of   • Lightheadedness 9/17/2015   • Mumps     history of   • Obstruction     ileostomy   • Other specified symptom associated with female genital organs     HYSTERECTOMY 1993   • Pneumonia 05/2017    tx'd with antibx   • Pulmonary embolism (HCC)    • 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)    • Renal insufficiency 3/14/2016   • Restless legs 5/6/2020   • Routine general medical examination at a health care facility 3/14/2016    3/14/16   • Seasonal allergies    • Swelling of both ankles     Lasix PRN   •  Tonsillitis      Past Surgical History:   Procedure Laterality Date   • PB EXPLORATORY OF ABDOMEN N/A 10/13/2021    Procedure: LAPAROTOMY, EXPLORATORY WITH REPAIR OF ENTEROTOMY;  Surgeon: Camilo Faust M.D.;  Location: SURGERY [facility];  Service: General   • LYSIS ADHESIONS GENERAL N/A 10/13/2021    Procedure: LYSIS, ADHESIONS;  Surgeon: Camilo Faust M.D.;  Location: SURGERY [facility];  Service: General   • HEPATIC ABLATION  5/28/2020    Procedure: ABLATION, LESION, LIVER-TRISEGMENTECTOMY, MICROWAVE ABLATION, INTRA OPERATIVE ULTRA SOUND, SIMPLE RESECTED / REPAIR OF DIAPHRAGM;  Surgeon: Kit West M.D.;  Location: SURGERY [facility];  Service: General   • HEPATIC ABLATION LAPAROSCOPIC  11/15/2018    Procedure: HEPATIC ABLATION LAPAROSCOPIC.- SEGMENT 7/8;  Surgeon: Kit West M.D.;  Location: SURGERY [facility];  Service: General   • COLONOSCOPY WITH BIOPSY  8/23/2015    Procedure: COLONOSCOPY WITH BIOPSY;  Surgeon: Wilbur Glasgow M.D.;  Location: ENDOSCOPY [facility];  Service:    • HEPATIC ABLATION LAPAROSCOPIC  7/6/2015    Procedure: HEPATIC ABLATION LAPAROSCOPIC.;  Surgeon: Kit West M.D.;  Location: SURGERY [facility];  Service:    • CATH PLACEMENT Left 7/6/2015    Procedure: CATH PLACEMENT CEPHALIC POWERPORT;  Surgeon: Kit West M.D.;  Location: SURGERY [facility];  Service:    • FISTULA IN ANO REPAIR  1/28/2015    Performed by Kolton Montgomery M.D. at [facility]   • PERINEAL PROCEDURE  1/28/2015    Performed by Kolton Montgomery M.D. at [facility]   • FISTULA IN ANO REPAIR  10/15/2014    Performed by Kolton Montgomery M.D. at [facility]   • PERINEAL PROCEDURE  10/15/2014    Performed by Kolton Montgomery M.D. at [facility]   • IRRIGATION & DEBRIDEMENT GENERAL  2/19/2014    Performed by Kolton Montgomery M.D. at [facility]   • FLAP GRAFT  2/19/2014    Performed by Kolton  "LLOYD Montgomery M.D. at SURGERY [place] [facility]   • PERINEAL PROCEDURE  12/18/2013    Performed by Kolton Montgomery M.D. at SUR[facility]ERY [place] [facility]   • MYOCUTANEOUS FLAP  12/18/2013    Performed by Kolton Montgomery M.D. at [facility]   • IRRIGATION & DEBRIDEMENT GENERAL  10/23/2013    Performed by Kolton Montgomery M.D. at SURGERY [place] [facility]   • FISTULA IN ANO REPAIR  9/4/2013    Performed by Kolton Montgomery M.D. at [facility]   • FLAP ROTATION  9/4/2013    Performed by Kolton Montgomery M.D. at SURGERY [place] [facility]   • RECOVERY  8/26/2013    Performed by [facility] at [facility] SAME DAY [facility]   • BIOPSY GENERAL  7/17/2013    Performed by Kolton Montgomery M.D. at [facility]   • PROCTOSCOPY  7/17/2013    Performed by Kolton Montgomery M.D. at [facility]   • FISTULA IN ANO REPAIR  2/27/2013    Performed by Kolton Montgomery M.D. at SURGERY [place] [facility]   • SIGMOIDOSCOPY  2/27/2013    Performed by Kolton Montgomery M.D. at [facility]   • LAPAROSCOPY  10/8/2012    Performed by Kolton Montgomery M.D. at [facility]   • ILEO LOOP DIVERSION  10/8/2012    Performed by Kolton Montgomery M.D. at SURGERY [place] [facility]   • COLECTOMY  9/26/2012    Performed by Kolton Montgomery M.D. at [facility]   • COLONOSCOPY FLEX W/ENDO US  9/20/2012    Performed by Harshil Bolton M.D. at [facility]   • OTHER  2009    left eye torn retina repair   • CATARACT EXTRACTION WITH IOL  2004    bilateral   • ABDOMINAL HYSTERECTOMY TOTAL  1983   • CYST EXCISION  1972    ovarian   • COLON RESECTION     • EYE SURGERY      cataracts   • HYSTERECTOMY LAPAROSCOPY     • PB REMV 2ND CATARACT,CORN-SCLER SECTN     • TONSILLECTOMY       Allergies   Allergen Reactions   • Oxaliplatin Anaphylaxis   • Amoxicillin Rash     Rash all over body   • Codeine      \"gets drunk\"   • Pcn [Penicillins] Itching     itching   • Sulfa Drugs Itching     itching   • Tape Rash     PAPER TAPE OK "     Facility-Administered Encounter Medications as of 10/19/2021   Medication Dose Route Frequency Provider Last Rate Last Admin   • phosphorus (K-Phos-Neutral) per tablet 500 mg  2 Tablet Oral TID German Frenandez D.O.       • potassium phosphate IVPB 30 mmol in 500 mL D5W (premix)  30 mmol Intravenous Once DARIN ChristopherOAngela 83.3 mL/hr at 10/18/21 0926 30 mmol at 10/18/21 0926   • dextrose 5 % and 0.45 % NaCl with KCl 20 mEq   Intravenous Continuous DARIN ChristopherOAngela 125 mL/hr at 10/18/21 0926 New Bag at 10/18/21 0926   • magnesium sulfate IVPB premix 2 g  2 g Intravenous Once DARIN ChristopherOAngela 25 mL/hr at 10/18/21 1010 2 g at 10/18/21 1010   • thiamine (Vitamin B-1) tablet 100 mg  100 mg Oral DAILY DARIN ChristopherOAngela   100 mg at 10/17/21 0937   • HYDROmorphone (Dilaudid) injection 1 mg  1 mg Intravenous Q3HRS PRN DARIN ChristopherOAngela   1 mg at 10/14/21 0032   • sodium chloride (HYPER-SAL) 7 % nebulizer solution 4 mL  4 mL Nebulization QDAILY (RT) DARIN ChristopherOAngela   4 mL at 10/18/21 0811   • albuterol (PROVENTIL) 2.5mg/0.5ml nebulizer solution 2.5 mg  2.5 mg Nebulization Q12HRS (RT) DARIN ChristopherOAngela   2.5 mg at 10/18/21 0811   • Respiratory Therapy Consult   Nebulization Continuous RT German Fernandez D.O.       • melatonin tablet 5 mg  5 mg Oral Nightly DARIN ChristopherOAngela       • albuterol (PROVENTIL) 2.5 mg/0.5 mL nebulizer solution 2.5 mg  2.5 mg Nebulization Q4HRS PRN [place].O.       • albuterol inhaler 2 Puff  2 Puff Inhalation Q6HRS PRN [place].O.       • senna-docusate (PERICOLACE or SENOKOT S) 8.6-50 MG per tablet 2 Tablet  2 Tablet Oral BID Lavell Soria D.O.   2 Tablet at 10/14/21 1714    And   • polyethylene glycol/lytes (MIRALAX) PACKET 1 Packet  1 Packet Oral QDAY PRN Lavell Soria D.O.        And   • magnesium hydroxide (MILK OF MAGNESIA) suspension 30 mL  30 mL Oral QDAY PRN Lavell Soria D.O.        And   • bisacodyl (DULCOLAX)  suppository 10 mg  10 mg Rectal QDAY PRN Lavell Soria D.O.       • heparin injection 5,000 Units  5,000 Units Subcutaneous Q8HRS [place].O.   5,000 Units at 10/18/21 0437   • acetaminophen (Tylenol) tablet 650 mg  650 mg Oral Q6HRS PRN [place].O.       • Pharmacy Consult Request ...Pain Management Review 1 Each  1 Each Other PHARMACY TO DOSE [place].O.       • oxyCODONE immediate-release (ROXICODONE) tablet 2.5 mg  2.5 mg Oral Q3HRS PRN Lavell Soria D.O.   2.5 mg at 10/12/21 0956    Or   • oxyCODONE immediate-release (ROXICODONE) tablet 5 mg  5 mg Oral Q3HRS PRN Lavell Soria D.O.   5 mg at 10/18/21 0624   • enalaprilat (Vasotec) injection 1.25 mg 1 mL  1.25 mg Intravenous Q6HRS PRN Lavell Soria D.O.       • labetalol (NORMODYNE/TRANDATE) injection 10 mg  10 mg Intravenous Q4HRS PRN [place].O.       • ondansetron (ZOFRAN) syringe/vial injection 4 mg  4 mg Intravenous Q4HRS PRN Lavell Soria D.O.   4 mg at 10/13/21 0348   • ondansetron (ZOFRAN ODT) dispertab 4 mg  4 mg Oral Q4HRS PRN Lavell Soria D.O.       • guaiFENesin dextromethorphan (ROBITUSSIN DM) 100-10 MG/5ML syrup 5 mL  5 mL Oral Q6HRS PRN Lavell Soria D.O.   5 mL at 10/16/21 0813     Outpatient Encounter Medications as of 10/19/2021   Medication Sig Dispense Refill   • Tiotropium Bromide-Olodaterol (STIOLTO RESPIMAT) 2.5-2.5 MCG/ACT Aero Soln Inhale 2 Puffs every day.     • rivaroxaban (XARELTO) 10 MG Tab tablet Take 10 mg by mouth with dinner. Indications: Blockage of Blood Vessel to Lung by a Particle     • sodium chloride (HYPER-SAL) 7 % Nebu Soln Take 4 mL by nebulization every day. 120 mL 3   • ondansetron (ZOFRAN) 4 MG Tab tablet Take 1 tablet by mouth every four hours as needed for Nausea/Vomiting (for nausea, vomiting). 30 tablet 6   • lidocaine-prilocaine (EMLA) 2.5-2.5 % Cream Apply to port one hour prior to access and cover with plastic wrap. 1 Each 3   •  albuterol (PROVENTIL) 2.5mg/3ml Nebu Soln solution for nebulization Take 3 mL by nebulization every four hours as needed for Shortness of Breath. 120 mL 11   • albuterol 108 (90 Base) MCG/ACT Aero Soln inhalation aerosol Inhale 2 Puffs every 6 hours as needed for Shortness of Breath. 1 Each 11   • potassium chloride ER (KLOR-CON) 10 MEQ tablet Take 1 tablet by mouth every day. 100 tablet 3   • cyanocobalamin (VITAMIN B-12) 500 MCG Tab Take 1,000 mcg by mouth every day.     • Multiple Vitamins-Minerals (CENTRUM SILVER PO) Take 1 Tab by mouth every day.     • Cholecalciferol (VITAMIN D3) 400 UNITS CAPS Take 1 Cap by mouth every day.     • loratadine (CLARITIN) 10 MG TABS Take 10 mg by mouth every day. Indications: Hayfever       Social History     Socioeconomic History   • Marital status:      Spouse name: Not on file   • Number of children: Not on file   • Years of education: Not on file   • Highest education level: Not on file   Occupational History   • Not on file   Tobacco Use   • Smoking status: Never Smoker   • Smokeless tobacco: Never Used   Vaping Use   • Vaping Use: Never used   Substance and Sexual Activity   • Alcohol use: No   • Drug use: No   • Sexual activity: Never     Partners: Male     Birth control/protection: Post-Menopausal   Other Topics Concern   • Primary/coprimary nurse & associates Not Asked   • Family contact information Not Asked   • OK to release patient information to the following Not Asked   • Patient preferred routine/Privacy concerns Not Asked   • Patient likes and dislikes Not Asked   • Participating In Research Study Not Asked   • Miscellaneous Not Asked   Social History Narrative   • Not on file     Social Determinants of Health     Financial Resource Strain:    • Difficulty of Paying Living Expenses:    Food Insecurity:    • Worried About Running Out of Food in the Last Year:    • Ran Out of Food in the Last Year:    Transportation Needs:    • Lack of Transportation  (Medical):    • Lack of Transportation (Non-Medical):    Physical Activity:    • Days of Exercise per Week:    • Minutes of Exercise per Session:    Stress:    • Feeling of Stress :    Social Connections:    • Frequency of Communication with Friends and Family:    • Frequency of Social Gatherings with Friends and Family:    • Attends Voodoo Services:    • Active Member of Clubs or Organizations:    • Attends Club or Organization Meetings:    • Marital Status:    Intimate Partner Violence:    • Fear of Current or Ex-Partner:    • Emotionally Abused:    • Physically Abused:    • Sexually Abused:       Social History     Tobacco Use   Smoking Status Never Smoker   Smokeless Tobacco Never Used     Social History     Substance and Sexual Activity   Alcohol Use No     Social History     Substance and Sexual Activity   Drug Use No        Family History   Problem Relation Age of Onset   • Heart Disease Mother    • Heart Failure Mother    • Hypertension Mother    • Hyperlipidemia Mother    • Cancer Mother    • Cancer Maternal Aunt 60        breast ca   • Lung Disease Brother         COPD/Emphysema/Smoker   • Cancer Brother         prostate cancer   • Alcohol/Drug Brother         Alcohol   • Kidney Disease Father         renal failure       ROS     Objective:   LMP  (LMP Unknown)     Physical Exam    Labs:  Results for KANG HALL (MRN [id number]) as of 10/18/2021 11:37   Ref. Range 10/18/2021 03:05   WBC Latest Ref Range: 4.8 - 10.8 K/uL 9.3   RBC Latest Ref Range: 4.20 - 5.40 M/uL 3.56 (L)   Hemoglobin Latest Ref Range: 12.0 - 16.0 g/dL 9.6 (L)   Hematocrit Latest Ref Range: 37.0 - 47.0 % 32.5 (L)   MCV Latest Ref Range: 81.4 - 97.8 fL 91.3   MCH Latest Ref Range: 27.0 - 33.0 pg 27.0   MCHC Latest Ref Range: 33.6 - 35.0 g/dL 29.5 (L)   RDW Latest Ref Range: 35.9 - 50.0 fL 67.2 (H)   Platelet Count Latest Ref Range: 164 - 446 K/uL 228   MPV Latest Ref Range: 9.0 - 12.9 fL 10.0   Neutrophils-Polys Latest Ref Range:  44.00 - 72.00 % 84.20 (H)   Neutrophils (Absolute) Latest Ref Range: 2.00 - 7.15 K/uL 7.83 (H)   Lymphocytes Latest Ref Range: 22.00 - 41.00 % 10.50 (L)   Lymphs (Absolute) Latest Ref Range: 1.00 - 4.80 K/uL 0.98 (L)   Monocytes Latest Ref Range: 0.00 - 13.40 % 3.50   Monos (Absolute) Latest Ref Range: 0.00 - 0.85 K/uL 0.33   Eosinophils Latest Ref Range: 0.00 - 6.90 % 0.90   Eos (Absolute) Latest Ref Range: 0.00 - 0.51 K/uL 0.08   Basophils Latest Ref Range: 0.00 - 1.80 % 0.00   Baso (Absolute) Latest Ref Range: 0.00 - 0.12 K/uL 0.00   Metamyelocytes Latest Units: % 0.90   Nucleated RBC Latest Units: /100 WBC 0.00   NRBC (Absolute) Latest Units: K/uL 0.00   Plt Estimation Unknown Normal   RBC Morphology Unknown Present   Anisocytosis Unknown 1+   Microcytosis Unknown 1+   Peripheral Smear Review Unknown see below   Manual Diff Status Unknown PERFORMED   Sodium Latest Ref Range: 135 - 145 mmol/L 144   Potassium Latest Ref Range: 3.6 - 5.5 mmol/L 3.9   Chloride Latest Ref Range: 96 - 112 mmol/L 107   Co2 Latest Ref Range: 20 - 33 mmol/L 32   Anion Gap Latest Ref Range: 7.0 - 16.0  5.0 (L)   Glucose Latest Ref Range: 65 - 99 mg/dL 170 (H)   Bun Latest Ref Range: 8 - 22 mg/dL 22   Creatinine Latest Ref Range: 0.50 - 1.40 mg/dL 0.67   GFR If  Latest Ref Range: >60 mL/min/1.73 m 2 >60   GFR If Non  Latest Ref Range: >60 mL/min/1.73 m 2 >60   Calcium Latest Ref Range: 8.5 - 10.5 mg/dL 8.7   Phosphorus Latest Ref Range: 2.5 - 4.5 mg/dL 1.6 (L)   Magnesium Latest Ref Range: 1.5 - 2.5 mg/dL 1.7       Imaging: 10/10/21 CT     Per my read,     IMPRESSION:     1.  Small bowel obstruction, with transition point is in the pelvis, 20-30 cm proximal to the ileostomy. This is most likely due to adhesions and less likely due to internal hernia.  2.  Posttreatment changes in the right hepatic dome, with a 10.1 cm multiloculated fluid collection at the site of prior ablation. Infected collection is  unlikely in the absence of internal pockets of air, but can be considered in the appropriate   clinical settings.  3.  No definitive evidence of abdominopelvic metastatic disease. Evaluation for liver metastases is difficult on this single phase study.  4.  Bronchiectasis/scarring in the lower lobes, right worse than left.      Pathology: 5/28/20     FINAL DIAGNOSIS:     A. Falciform ligament:          Portion of benign fibrovascular adipose tissue.          No malignancy identified.      Procedures: 5/28/20     1. Exploratory laparotomy.  2. Open microwave thermal ablation x10 involving 2 separate lesions in the   right lobe and medial segment of the left lobe.  This was done with 6   overlapping 100 watt at 2-minute ablation giving us 6 overlapping 3.2x4.0 cm   ablation cavities.  The second ablation was essentially right at the origin of   the right portal pedicle approximately 2 cm away from the left portal   pedicle, which most of her liver function is from that left lobe.  She went   over a single ablation of 100 watt at 2 minutes giving us a 3x3.4 cm ablation   of a 2 cm tumor.  The rest of her liver was clean.  We were confident that we   were away enough from the hilum and the left portal pedicle, but we were   unable to do the right lobe resection due to involvement of the diaphragm,   which we had to resect and repair as well as the main right portal pedicle   near the hilum, we felt that we were going to risk injuring her left lobe   pedicle.  3. Resection and simple repair of right hemidiaphragm.          Diagnosis:     1. Malignant neoplasm of rectum (HCC)     2. Secondary malignant neoplasm of liver (HCC)     3. Liver disease             Medical Decision Making:  Today's Assessment / Status / Plan:     ***

## 2021-10-18 NOTE — CARE PLAN
Problem: Pain - Standard  Goal: Alleviation of pain or a reduction in pain to the patient’s comfort goal  Outcome: Progressing     Problem: Knowledge Deficit - Standard  Goal: Patient and family/care givers will demonstrate understanding of plan of care, disease process/condition, diagnostic tests and medications  Outcome: Progressing     The patient is Stable - Low risk of patient condition declining or worsening    Shift Goals  Clinical Goals: maintain skin integrity and safety  Patient Goals: advance diet  Family Goals: No family present

## 2021-10-18 NOTE — PROGRESS NOTES
[facility] Medicine Daily Progress Note    Date of Service  10/18/2021    Chief Complaint  Karlene Walters is a 81 y.o. female admitted 10/10/2021 with small bowel obstruction, nausea, vomiting    Hospital Course  No notes on file    Ms. Karlene Walters is a 81 y.o. 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  He did improve his showed small bowel obstruction.  General surgery was consulted with NG tube placement and bowel rest.  Status post lysis of adhesions and repair of enterotomy on 10/13.  NG tube was discontinued on 10/16 and diet was advanced.      Interval Problem Update  Patient was seen and examined at bedside.  I have personally reviewed and interpreted vitals, labs, and imaging.    10/12.  Afebrile.  Has been tachycardic.  On 3-4 liters nasal cannula.  Denies fever, chills, chest pains, shortness of breath.  She does report some abdominal pain.  No ostomy output and minimal urine output.  With BRIONNA, high anion gap acidosis I did start an IV fluids.  Abdomen is less distended and pain is improved.  She does request something for sleep.  Discussed with general surgery, oncology  10/13.  Afebrile.  Persistently tachycardic.  On 4 L nasal cannula.  Leukocytosis trending down.  Procalcitonin remains elevated.  Patient still with minimal urine output through pure wick.  Bladder scan shows 324 mL.  Kidney function remains elevated although anion gap acidosis is improved.  Continue ceftriaxone, metronidazole.  Increase maintenance fluids.  Denies fever, chills, chest pains, shortness of breath.  Still having significant abdominal pain with output from NG tube.  She is agreeable for surgery today.  10/14.  Afebrile.  Has been tachycardic and hypotensive.  3-6 L nasal cannula.  Hemoglobin dropped from 12-10 after  surgery yesterday.  Monitor leukocytosis.  Kidney function trending up to 2.34.  Denies fever, chills, chest pains, shortness of breath.  Abdominal pain is improved and less distention.  NG tube still to suction.  Hold off on diet until return of bowel function per surgery.  10/15.  Afebrile.  Hypotension is improved.  Still tachycardic.  On 3 L nasal cannula.  Leukocytosis trending down.  Drop in hemoglobin since surgery.  Continue to monitor.  Kidney function is improved.  Some hypernatremia now on LR.  Denies fever, chills, chest pains, shortness of breath.  Abdominal pain is improved.  Having output through ostomy as well as NG tube.  Adequate urine output and improvement in kidney function.  Per surgery okay to have ice chips, meds with sips.  Advance diet per surgery.  Complete 3 days of ceftriaxone metronidazole per surgery  10/16.  Afebrile.  Has been tachycardic, tachypneic.  Episode of hypotension.  On 3-6 L nasal cannula.  Leukocytosis is trending down however repeat procalcitonin is elevated at 7.64.  We will continue to monitor response to antibiotics.  Kidney function is improving with creatinine 1.13.  Worsening hypernatremia at 151 with hypokalemia at 3.1.  Denies fever, chills, chest pains, shortness of breath.  Abdominal pain and distention are improved.  Patient has output through ostomy as well as NG tube.  Okay to discontinue NG tube and start clear liquids per surgery.  10/17.  Afebrile.  Has been tachycardic.  On 3 L nasal cannula.  Replete magnesium, potassium, phosphorus.  Concern for refeeding syndrome.  Started on thiamine.  Patient remains hypernatremic.  Switch IV fluids to D5 1/2 NS.  Kidney function much improved but patient now becoming alkalotic.  Continue to monitor.  Has completed antibiotics for 3 days for SBO/perforation per surgery.  Elevated procalcitonin but no leukocytosis.  Denies fevers, chills, chest pains.  Reports persistent abdominal pain.  This morning there is  serosanguineous drainage from surgical incision.  There is also leaking from ostomy bag onto the surgical incision.  Ostomy nurse was called.  Okay to advance diet per surgery.  10/18.  Afebrile.  Has been tachycardic.  On 2-3 L nasal cannula.  Denies fever, chills.  Reports persistent abdominal pain and distention.  Has not been eating well.  Denies nausea and vomiting.  States food does not taste good.  Appreciate wound care and general surgery recommendations.  Did not want to participate with physical therapy today and not interested in eating much.  Also refusing oral medications.  Palliative care consult for goals of care    I have personally seen and examined the patient at bedside. I discussed the plan of care with patient.  Nurse, social work    Consultants/Specialty  general surgery and oncology    Code Status  Full Code    Disposition  Patient is not medically cleared.   Anticipate discharge to to home with close outpatient follow-up.  I have placed the appropriate orders for post-discharge needs.    Review of Systems  Review of Systems   Constitutional: Negative for chills and fever.   HENT: Negative for congestion and sore throat.    Eyes: Negative for blurred vision.   Respiratory: Negative for cough and shortness of breath.    Cardiovascular: Negative for chest pain, palpitations and leg swelling.   Gastrointestinal: Positive for abdominal pain, nausea and vomiting. Negative for constipation and diarrhea.   Genitourinary: Negative for dysuria, frequency and urgency.   Musculoskeletal: Negative for falls.   Skin: Negative for rash.   Neurological: Negative for dizziness, weakness and headaches.   Psychiatric/Behavioral: Negative for depression. The patient is nervous/anxious.    All other systems reviewed and are negative.       Physical Exam  Temp:  [36.1 °C (97 °F)-36.7 °C (98 °F)] 36.2 °C (97.2 °F)  Pulse:  [] 104  Resp:  [18-19] 18  BP: (117-156)/() 129/106  SpO2:  [95 %-100 %] 100  %    Physical Exam  Vitals and nursing note reviewed.   Constitutional:       Appearance: Normal appearance. She is ill-appearing.   HENT:      Head: Normocephalic and atraumatic.      Right Ear: External ear normal.      Left Ear: External ear normal.      Nose: Nose normal.      Mouth/Throat:      Mouth: Mucous membranes are moist.      Pharynx: Oropharynx is clear. No oropharyngeal exudate or posterior oropharyngeal erythema.   Eyes:      Extraocular Movements: Extraocular movements intact.      Conjunctiva/sclera: Conjunctivae normal.   Cardiovascular:      Rate and Rhythm: Regular rhythm. Tachycardia present.      Pulses: Normal pulses.      Heart sounds: Normal heart sounds. No murmur heard.     Pulmonary:      Effort: Pulmonary effort is normal. No respiratory distress.      Breath sounds: No stridor. Rhonchi present. No wheezing or rales.   Abdominal:      General: Bowel sounds are decreased. There is distension.      Palpations: There is no mass.      Tenderness: There is abdominal tenderness.      Comments: Ostomy in place   Genitourinary:     Comments: Pure wick  Musculoskeletal:      Cervical back: Normal range of motion.   Skin:     General: Skin is warm.      Capillary Refill: Capillary refill takes less than 2 seconds.   Neurological:      General: No focal deficit present.      Mental Status: She is alert and oriented to person, place, and time. Mental status is at baseline.      Cranial Nerves: No cranial nerve deficit.   Psychiatric:         Mood and Affect: Mood is anxious.         Behavior: Behavior normal.         Fluids    Intake/Output Summary (Last 24 hours) at 10/18/2021 0814  Last data filed at 10/18/2021 0756  Gross per 24 hour   Intake 1935 ml   Output 1525 ml   Net 410 ml       Laboratory  Recent Labs     10/16/21  0050 10/17/21  0332 10/18/21  0305   WBC 9.2 6.8 9.3   RBC 3.17* 4.14* 3.56*   HEMOGLOBIN 8.6* 11.0* 9.6*   HEMATOCRIT 28.4* 37.5 32.5*   MCV 89.6 90.6 91.3   MCH 27.1 26.6*  27.0   MCHC 30.3* 29.3* 29.5*   RDW 67.1* 67.3* 67.2*   PLATELETCT 224 177 228   MPV 9.4 9.5 10.0     Recent Labs     10/16/21  0050 10/17/21  0332 10/18/21  0305   SODIUM 151* 150* 144   POTASSIUM 3.1* 3.6 3.9   CHLORIDE 109 112 107   CO2 26 34* 32   GLUCOSE 74 167* 170*   BUN 49* 33* 22   CREATININE 1.13 0.85 0.67   CALCIUM 9.4 9.1 8.7                   Imaging  DX-ABDOMEN FOR TUBE PLACEMENT   Final Result      Enteric tube tip projects over the stomach      CT-ABDOMEN-PELVIS WITH   Final Result      1.  Small bowel obstruction, with transition point is in the pelvis, 20-30 cm proximal to the ileostomy. This is most likely due to adhesions and less likely due to internal hernia.   2.  Posttreatment changes in the right hepatic dome, with a 10.1 cm multiloculated fluid collection at the site of prior ablation. Infected collection is unlikely in the absence of internal pockets of air, but can be considered in the appropriate    clinical settings.   3.  No definitive evidence of abdominopelvic metastatic disease. Evaluation for liver metastases is difficult on this single phase study.   4.  Bronchiectasis/scarring in the lower lobes, right worse than left.      DX-CHEST-PORTABLE (1 VIEW)   Final Result      Right basilar atelectasis. No focal consolidation. No effusions.           Assessment/Plan  * Small bowel obstruction (HCC)- (present on admission)  Assessment & Plan  Supportive care  Continue maintenance fluids  Advance diet as tolerated however n.p.o. for active nausea and vomiting  General surgery consulted and recommends NG tube placement and evaluation by colleague in a.m.  CT abdomen pelvis consistent with small bowel obstruction as seen above in imaging  Analgesics for pain control  Antiemetics for nausea and vomiting  Status post surgery on 10/13 with lysis of adhesions and repair of enterotomy  Continue NG tube to suction.  Awaiting return of bowel function.  NG tube discontinued 10/16.  Started on clear  liquids.  Advanced to soft diet 10/17  Patient refusing much p.o.  Started on Ensure 10/18.    Sepsis (HCC)- (present on admission)  Assessment & Plan  This is Sepsis Present on admission  SIRS criteria identified on my evaluation include: Tachycardia, with heart rate greater than 90 BPM, Tachypnea, with respirations greater than 20 per minute and Leukocytosis, with WBC greater than 12,000  Source is intra-abdominal.  On exploratory laparotomy showed perforation  Sepsis protocol initiated  Fluid resuscitation ordered per protocol  IV antibiotics as appropriate for source of sepsis  While organ dysfunction may be noted elsewhere in this problem list or in the chart, degree of organ dysfunction does not meet CMS criteria for severe sepsis.  Procalcitonin ordered and minimally elevated 0.55.  Blood cultures x2, sputum culture for cough with sputum production.  Flagyl and ceftriaxone as IV antibiotic.    Resolved  Has been tachycardic.  Completed 3 days of Ceftriaxone, Metronidazole for SBO, perforation per surgery.  Concern for ostomy leaking onto surgical incision.  Continue to monitor for sources of infection    Metabolic acidosis, increased anion gap (IAG)- (present on admission)  Assessment & Plan  Acidosis resolved  Likely secondary lactic acidosis from SBO  Trend lactic acid  Repeat CMP in a.m.  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  Patient does have sepsis and she also reports having a cough with sputum production and possibly has 2 sources for infection.  Sputum culture ordered and pending.  Consider IR consultation    Patient has history of rectal cancer metastasis to the liver  status post partial hepatectomy and ablation.  Followed by oncology.  Continue antibiotics for SBO, perforation.  Monitor clinical course    Cough with sputum- (present on admission)  Assessment & Plan  Pep therapy  Chest x-ray reveals basilar atelectasis  Robitussin-DM as needed  Sputum culture ordered and pending    Hyponatremia- (present on admission)  Assessment & Plan  Resolved.   Now hypernatremia.  Continue D5 half-normal saline with potassium    BRIONNA (acute kidney injury) (HCC)- (present on admission)  Assessment & Plan  Continue maintenance fluids  Urinalysis/urine sodium for fractional excretion of Na  Likely prerenal azotemia secondary to dehydration, sepsis also contributing  Avoid nephrotoxic agents  Kidneys appear normal on CT.    History of pulmonary embolus (PE)- (present on admission)  Assessment & Plan  Holding Xarelto due to BRIONNA   Continue heparin for now        VTE prophylaxis: heparin ppx    I have performed a physical exam and reviewed and updated ROS and Plan today (10/18/2021). In review of yesterday's note (10/17/2021), there are no changes except as documented above.

## 2021-10-18 NOTE — PROGRESS NOTES
Acute care surgery [facility] surgical Progress Note    Author: Jannet Baxter M.D. Date & Time created: 10/18/2021   8:33 AM     Interval Events:  Postop day 5 lysis of adhesions and repair of enterectomy    Sleeping this morning.  Says she feels terrible but cannot pinpoint a reason why.  She denies nausea and vomiting.  She does not have much of an appetite and did not eat much yesterday.  She does not feel hungry at all.  Review of Systems   Constitutional: Negative for chills and fever.   Gastrointestinal: Positive for abdominal pain. Negative for nausea and vomiting.     Hemodynamics:  Temp (24hrs), Av.4 °C (97.5 °F), Min:36.1 °C (97 °F), Max:36.7 °C (98 °F)  Temperature: 36.2 °C (97.2 °F)  Pulse  Av.6  Min: 72  Max: 127   Blood Pressure : 129/106     Respiratory:    Respiration: 18, Pulse Oximetry: 100 %     Given By:: Mouthpiece, Work Of Breathing / Effort: Within Normal Limits  RUL Breath Sounds: Clear, RML Breath Sounds: Diminished, RLL Breath Sounds: Diminished, EARNEST Breath Sounds: Clear, LLL Breath Sounds: Diminished  Neuro:  GCS       Fluids:    Intake/Output Summary (Last 24 hours) at 10/18/2021 0833  Last data filed at 10/18/2021 0756  Gross per 24 hour   Intake 1935 ml   Output 1525 ml   Net 410 ml        Current Diet Order   Procedures   • Diet Order Diet: Low Fiber(GI Soft)     Physical Exam  Constitutional:       General: She is not in acute distress.     Appearance: She is ill-appearing. She is not toxic-appearing or diaphoretic.   HENT:      Head: Normocephalic and atraumatic.      Right Ear: External ear normal.      Left Ear: External ear normal.      Nose: Nose normal.      Mouth/Throat:      Mouth: Mucous membranes are moist.   Eyes:      Extraocular Movements: Extraocular movements intact.   Cardiovascular:      Rate and Rhythm: Normal rate and regular rhythm.      Pulses: Normal pulses.   Pulmonary:      Effort: Pulmonary effort is normal.   Abdominal:      General: Abdomen is  flat. There is no distension.      Tenderness: There is no abdominal tenderness.      Comments: Incision clean dry and intact.  Ostomy with air and liquid stool in bag.  About a liter output yesterday.   Skin:     General: Skin is warm and dry.      Capillary Refill: Capillary refill takes less than 2 seconds.   Neurological:      General: No focal deficit present.      Mental Status: She is alert and oriented to person, place, and time.   Psychiatric:         Mood and Affect: Mood normal.         Behavior: Behavior normal.       Labs:  Recent Results (from the past 24 hour(s))   CBC WITH DIFFERENTIAL    Collection Time: 10/18/21  3:05 AM   Result Value Ref Range    WBC 9.3 4.8 - 10.8 K/uL    RBC 3.56 (L) 4.20 - 5.40 M/uL    Hemoglobin 9.6 (L) 12.0 - 16.0 g/dL    Hematocrit 32.5 (L) 37.0 - 47.0 %    MCV 91.3 81.4 - 97.8 fL    MCH 27.0 27.0 - 33.0 pg    MCHC 29.5 (L) 33.6 - 35.0 g/dL    RDW 67.2 (H) 35.9 - 50.0 fL    Platelet Count 228 164 - 446 K/uL    MPV 10.0 9.0 - 12.9 fL    Neutrophils-Polys 84.20 (H) 44.00 - 72.00 %    Lymphocytes 10.50 (L) 22.00 - 41.00 %    Monocytes 3.50 0.00 - 13.40 %    Eosinophils 0.90 0.00 - 6.90 %    Basophils 0.00 0.00 - 1.80 %    Nucleated RBC 0.00 /100 WBC    Neutrophils (Absolute) 7.83 (H) 2.00 - 7.15 K/uL    Lymphs (Absolute) 0.98 (L) 1.00 - 4.80 K/uL    Monos (Absolute) 0.33 0.00 - 0.85 K/uL    Eos (Absolute) 0.08 0.00 - 0.51 K/uL    Baso (Absolute) 0.00 0.00 - 0.12 K/uL    NRBC (Absolute) 0.00 K/uL    Anisocytosis 1+     Microcytosis 1+    Basic Metabolic Panel    Collection Time: 10/18/21  3:05 AM   Result Value Ref Range    Sodium 144 135 - 145 mmol/L    Potassium 3.9 3.6 - 5.5 mmol/L    Chloride 107 96 - 112 mmol/L    Co2 32 20 - 33 mmol/L    Glucose 170 (H) 65 - 99 mg/dL    Bun 22 8 - 22 mg/dL    Creatinine 0.67 0.50 - 1.40 mg/dL    Calcium 8.7 8.5 - 10.5 mg/dL    Anion Gap 5.0 (L) 7.0 - 16.0   MAGNESIUM    Collection Time: 10/18/21  3:05 AM   Result Value Ref Range     Magnesium 1.7 1.5 - 2.5 mg/dL   PHOSPHORUS    Collection Time: 10/18/21  3:05 AM   Result Value Ref Range    Phosphorus 1.6 (L) 2.5 - 4.5 mg/dL   DIFFERENTIAL MANUAL    Collection Time: 10/18/21  3:05 AM   Result Value Ref Range    Metamyelocytes 0.90 %    Manual Diff Status PERFORMED    PERIPHERAL SMEAR REVIEW    Collection Time: 10/18/21  3:05 AM   Result Value Ref Range    Peripheral Smear Review see below    PLATELET ESTIMATE    Collection Time: 10/18/21  3:05 AM   Result Value Ref Range    Plt Estimation Normal    MORPHOLOGY    Collection Time: 10/18/21  3:05 AM   Result Value Ref Range    RBC Morphology Present    ESTIMATED GFR    Collection Time: 10/18/21  3:05 AM   Result Value Ref Range    GFR If African American >60 >60 mL/min/1.73 m 2    GFR If Non African American >60 >60 mL/min/1.73 m 2     Medical Decision Making, by Problem:  Active Hospital Problems    Diagnosis    • History of pulmonary embolus (PE) [Z86.711]      Priority: High   • 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  Patient agreeable.  No evidence of infection at incision at this time.  Continue PT OT.      Quality Measures:  Quality-Core Measures

## 2021-10-18 NOTE — THERAPY
"Physical Therapy  Re-evaluation     Patient Name: Karlene Walters  Age:  81 y.o., Sex:  female  Medical Record #: [id number]  Today's Date: 10/18/2021     Precautions: Fall Risk  Comments: Ostomy    Assessment    Pt with a significant decline in fxnl mob now s/p ex lap lysis of adhesions and repair of enterotomy POD #5. She reports she has not been out of bed since surgery last week. She now requires mod A to negotiate bed mob and is unable to stand without max A. She demonstrates a posterior lean in standing with decreased extension limiting her ability to completely stand upright. HR increased to 130s with bed mob and STS attempt. Prior to surgery last week she was able to amb x20ft with walker and working on stair negotiation. Discussed option of discharging to placement to increase strength prior to home and pt states she is not agreeable. When asked how she would get up stairs into her home she stated \"my  and daughter\",  states \"that's what we were doing anyway\". Recommend post-acute placement given significant level of assist pt is requiring at this time. Goals modified post-op.    Pt may benefit from Palliative Consult given limited participation to progress mobility and requiring max encouragement.     Plan    Treatment plan modified to 3 times per week until therapy goals are met for the following treatments:  Bed Mobility, Gait Training, Neuro Re-Education / Balance, Therapeutic Activities and Therapeutic Exercises.    DC Equipment Recommendations: Unable to determine at this time  Discharge Recommendations: Recommend post-acute placement for additional physical therapy services prior to discharge home     Objective     10/18/21 1452   Vitals   Vitals Comments Tachycardic with HR to 130s with exertion   Cognition    Level of Consciousness Alert   Initiation Impaired   Comments max encouragement to participate   Strength Lower Body   Gross Strength Generalized Weakness, Equal Bilaterally "   Comments difficulty negotiating LEs to EOB requiring UE assist L>R   Other Treatments   Other Treatments Provided Education on increasing mobility to reduce caregiver strain   Balance   Sitting Balance (Static) Fair +   Sitting Balance (Dynamic) Fair   Standing Balance (Static) Trace   Standing Balance (Dynamic) Dependent   Comments posterior lean in standing   Gait Analysis   Gait Level Of Assist Unable to Participate   Bed Mobility    Supine to Sit Moderate Assist   Sit to Supine Minimal Assist   Rolling Moderate Assist to Lt.   Comments VC to sequence log roll s/p ex lap   Functional Mobility   Sit to Stand Maximal Assist   Bed, Chair, Wheelchair Transfer Unable to Participate   Short Term Goals    Short Term Goal # 1 Pt will amb >15ft with FWW and min A within 6 visits to progress toward limited household distances for DC.   Goal Outcome # 1 Goal modified 10/18   Short Term Goal # 2 Pt will perform supine<>sit with HOB flat and min A via log roll within 6 visits to improve ind with bed mob.   Goal Outcome # 2 Goal modified 10/18   Short Term Goal # 3 Pt will perform all fxnl transfers with FWW and min A within 6 visits to increase OOB activity.   Goal Outcome # 3 Goal modified 10/18

## 2021-10-18 NOTE — CARE PLAN
Problem: Nutritional:  Goal: Achieve adequate nutritional intake  Description: Patient will consume PO diet and 50% of meals.  Outcome: Not Met

## 2021-10-18 NOTE — DIETARY
Nutrition Update:  Patient reports having no appetite and eating only bites of food.   at bedside confirmed ongoing poor PO intake for weeks before this admit.  Patient stated she is not interested in tube feeding.  On physical observation, patient is visibly cachectic. She was minimally responsive to questions and kept shaking her head and closing her eyes.    Patient day 8 of admit with minimal nutrition.  She is now on a GI soft diet but not eating.  No current weight since admit. D/W nursing and MD.    Malnutrition Risk: Severe acute illness related malnutrition as evidenced by consuming <50% for >1 month. She has severe fat and severe muscle losses with hollow temples and prominent clavicles.    Recommend:.  Obtain current weight.  Consider palliative consult.  RD following goals/plans of care.

## 2021-10-18 NOTE — CARE PLAN
The patient is Stable - Low risk of patient condition declining or worsening    Shift Goals  Clinical Goals: skin integrity  Patient Goals: advance diet  Family Goals: No family present    Progress made toward(s) clinical / shift goals:    Problem: Gastrointestinal Irritability  Goal: Nausea and vomiting will be absent or improve  Note: Pt has denied nausea throughout shift. Diet has been advanced to low fiber/GI soft. Watery green output out of ostomy.        Patient is not progressing towards the following goals:      Problem: Skin Integrity  Goal: Skin integrity is maintained or improved  Outcome: Not Progressing  Note: Pt with sacral wound, pt continues to refuse turns/repositioning stating she does herself, this RN doesn't feel she adequately off loads sacrum. Educated but pt will not allow.   Ostomy found leaking early this shift, pt states she tried changing it very early this morning. Wound care unable to see pt today. This RN changed appliance; cleaned surrounding area, redressed surgical site; appliance remains intact at this time. Requested physician order wound consult for ostomy care.

## 2021-10-19 ENCOUNTER — APPOINTMENT (OUTPATIENT)
Dept: SURGICAL ONCOLOGY | Facility: MEDICAL CENTER | Age: 81
End: 2021-10-19
Payer: MEDICARE

## 2021-10-19 PROCEDURE — A9270 NON-COVERED ITEM OR SERVICE: HCPCS | Performed by: STUDENT IN AN ORGANIZED HEALTH CARE EDUCATION/TRAINING PROGRAM

## 2021-10-19 PROCEDURE — 94760 N-INVAS EAR/PLS OXIMETRY 1: CPT

## 2021-10-19 PROCEDURE — 94640 AIRWAY INHALATION TREATMENT: CPT

## 2021-10-19 PROCEDURE — 700105 HCHG RX REV CODE 258: Performed by: INTERNAL MEDICINE

## 2021-10-19 PROCEDURE — 700102 HCHG RX REV CODE 250 W/ 637 OVERRIDE(OP): Performed by: STUDENT IN AN ORGANIZED HEALTH CARE EDUCATION/TRAINING PROGRAM

## 2021-10-19 PROCEDURE — 700111 HCHG RX REV CODE 636 W/ 250 OVERRIDE (IP): Performed by: STUDENT IN AN ORGANIZED HEALTH CARE EDUCATION/TRAINING PROGRAM

## 2021-10-19 PROCEDURE — 99024 POSTOP FOLLOW-UP VISIT: CPT | Performed by: SURGERY

## 2021-10-19 PROCEDURE — 700101 HCHG RX REV CODE 250: Performed by: INTERNAL MEDICINE

## 2021-10-19 PROCEDURE — 770004 HCHG ROOM/CARE - ONCOLOGY PRIVATE *

## 2021-10-19 PROCEDURE — 99232 SBSQ HOSP IP/OBS MODERATE 35: CPT | Performed by: STUDENT IN AN ORGANIZED HEALTH CARE EDUCATION/TRAINING PROGRAM

## 2021-10-19 RX ADMIN — HEPARIN SODIUM 5000 UNITS: 5000 INJECTION, SOLUTION INTRAVENOUS; SUBCUTANEOUS at 13:47

## 2021-10-19 RX ADMIN — HEPARIN SODIUM 5000 UNITS: 5000 INJECTION, SOLUTION INTRAVENOUS; SUBCUTANEOUS at 05:34

## 2021-10-19 RX ADMIN — HEPARIN SODIUM 5000 UNITS: 5000 INJECTION, SOLUTION INTRAVENOUS; SUBCUTANEOUS at 20:31

## 2021-10-19 RX ADMIN — SODIUM CHLORIDE 4 ML: 7 NEBU SOLN,3 % NEBU at 08:10

## 2021-10-19 RX ADMIN — DEXTROSE AND SODIUM CHLORIDE: 5; 450 INJECTION, SOLUTION INTRAVENOUS at 02:50

## 2021-10-19 RX ADMIN — DEXTROSE AND SODIUM CHLORIDE: 5; 450 INJECTION, SOLUTION INTRAVENOUS at 12:40

## 2021-10-19 RX ADMIN — ALBUTEROL SULFATE 2.5 MG: 2.5 SOLUTION RESPIRATORY (INHALATION) at 19:05

## 2021-10-19 RX ADMIN — ALBUTEROL SULFATE 2.5 MG: 2.5 SOLUTION RESPIRATORY (INHALATION) at 08:09

## 2021-10-19 RX ADMIN — OXYCODONE 5 MG: 5 TABLET ORAL at 15:50

## 2021-10-19 ASSESSMENT — COGNITIVE AND FUNCTIONAL STATUS - GENERAL
EATING MEALS: A LOT
HELP NEEDED FOR BATHING: A LOT
STANDING UP FROM CHAIR USING ARMS: TOTAL
TOILETING: A LOT
SUGGESTED CMS G CODE MODIFIER MOBILITY: CN
DAILY ACTIVITIY SCORE: 12
MOBILITY SCORE: 6
SUGGESTED CMS G CODE MODIFIER DAILY ACTIVITY: CL
DRESSING REGULAR LOWER BODY CLOTHING: A LOT
CLIMB 3 TO 5 STEPS WITH RAILING: TOTAL
PERSONAL GROOMING: A LOT
DRESSING REGULAR UPPER BODY CLOTHING: A LOT
MOVING FROM LYING ON BACK TO SITTING ON SIDE OF FLAT BED: UNABLE
WALKING IN HOSPITAL ROOM: TOTAL
MOVING TO AND FROM BED TO CHAIR: UNABLE
TURNING FROM BACK TO SIDE WHILE IN FLAT BAD: UNABLE

## 2021-10-19 ASSESSMENT — ENCOUNTER SYMPTOMS
MYALGIAS: 0
FLANK PAIN: 0
SINUS PAIN: 0
NECK PAIN: 0
CHILLS: 0
NAUSEA: 0
DIZZINESS: 0
NERVOUS/ANXIOUS: 0
BLOOD IN STOOL: 0
EYE PAIN: 0
VOMITING: 0
BACK PAIN: 0
DEPRESSION: 0
SHORTNESS OF BREATH: 0
BRUISES/BLEEDS EASILY: 0
ABDOMINAL PAIN: 0
ABDOMINAL PAIN: 1
CONSTIPATION: 0
SORE THROAT: 0
DIARRHEA: 0
FEVER: 0
HEADACHES: 0
COUGH: 0
WEAKNESS: 1
WEIGHT LOSS: 1

## 2021-10-19 ASSESSMENT — PAIN DESCRIPTION - PAIN TYPE
TYPE: ACUTE PAIN
TYPE: ACUTE PAIN

## 2021-10-19 NOTE — CARE PLAN
Problem: Pain - Standard  Goal: Alleviation of pain or a reduction in pain to the patient’s comfort goal  Outcome: Progressing     Problem: Knowledge Deficit - Standard  Goal: Patient and family/care givers will demonstrate understanding of plan of care, disease process/condition, diagnostic tests and medications  Outcome: Progressing     The patient is Stable - Low risk of patient condition declining or worsening    Shift Goals  Clinical Goals: maintain skin integrity, increase oral intake   Patient Goals: advance diet  Family Goals: No family present    Pt is A&Ox4. Pt uses call light appropriately and makes needs known. Bed is locked and in the lowest position. Pt has no complaints of pain or nausea at this time. Q2hr turns as tolerated. Will continue to monitor and intervene as needed.

## 2021-10-19 NOTE — CARE PLAN
The patient is Stable - Low risk of patient condition declining or worsening    Shift Goals  Clinical Goals: Maintain skin integrity    Progress made toward(s) clinical / shift goals:    Plan of care reviewed with pt, all questions and concerns addressed. Pt refusing assessment of sacrum and only allowing minimal turns. Pt education provided on the importance of maintaining skin integrity.   Problem: Knowledge Deficit - Standard  Goal: Patient and family/care givers will demonstrate understanding of plan of care, disease process/condition, diagnostic tests and medications  Outcome: Progressing     Problem: Skin Integrity  Goal: Skin integrity is maintained or improved  Outcome: Progressing     Problem: Fall Risk  Goal: Patient will remain free from falls  Outcome: Progressing     Problem: Gastrointestinal Irritability  Goal: Nausea and vomiting will be absent or improve  Outcome: Progressing     COVID-19 surge in effect

## 2021-10-19 NOTE — WOUND TEAM
"[facility]   Inpatient Services   Established Ostomy Management/ troubleshooting  HPI: Reviewed  PMH: Reviewed   SH: Reviewed   Reason for Ostomy nurse consult:  Leaking into midline incision  Subjective: \"Okay\"  Objective: appliance intact, serosanguinous strike-through on gauze drsg  Ostomy type: ileostomy  Stoma location: RLQ      Ileostomy RLQ (Active)      10/12/21 1700   Stomal Appliance Assessment Intact    Stoma Assessment Beefy red    Stoma Shape Oval;Budded Less Than One Inch    Stoma Size (in) 2    Peristomal Assessment Intact    Mucocutaneous Junction Intact    Treatment Cleansed with water/washcloth;Appliance Changed    Peristomal Protectant No Sting Skin Prep    Stomal Appliance 2 3/4\" (70mm) CTF    Output (mL) 150 mL    Output Color Green    WOUND RN ONLY - Stomal Appliance  2\" (51mm) CTF;2 Piece    Appliance (Pouch) # Pouch: [id number], Barrier: [id number], Convex Rin, Paste ring since [place] washer not carried by hospital     Appliance Brand [place]          Ostomy Appliance (type and size): two piece convex CTF  Assessment: Pt's appliance was intact, midline incision with scant serosanguinous drainage when appliance was removed.   Interventions:  Removed appliance, cleaned skin with moist warm washcloth, dried. Wound cleaned with NS and gauze. Cut barrier to fit, applied to skin. Applied pouch and closed end. Placed piece of gauze over incision @ distal medial edge of barrier tape border and helped secure with hypafix tape.   Pt education: Questions and concerns addressed.  Plan: Ostomy nurses to continue to follow for ostomy needs and teaching PRN.  Anticipated discharge needs:Supplies, supplier information, possible HH or outpatient ostomy clinic.      "

## 2021-10-19 NOTE — PROGRESS NOTES
[facility] Progress Note               Author: Jannet Baxter M.D. Date & Time created: 10/19/2021  8:51 AM     Interval History:  NO pain, nausea or vomiting. No appetite so not eating well. Tolerating Ensure shakes.     Review of Systems:  Review of Systems   Constitutional: Negative for chills and fever.   Gastrointestinal: Negative for abdominal pain, nausea and vomiting.       Physical Exam:  Physical Exam  Constitutional:       General: She is not in acute distress.     Appearance: Normal appearance. She is ill-appearing. She is not toxic-appearing or diaphoretic.   HENT:      Head: Normocephalic and atraumatic.      Right Ear: External ear normal.      Left Ear: External ear normal.      Mouth/Throat:      Mouth: Mucous membranes are moist.   Eyes:      Extraocular Movements: Extraocular movements intact.   Cardiovascular:      Rate and Rhythm: Normal rate and regular rhythm.   Pulmonary:      Effort: Pulmonary effort is normal.   Abdominal:      General: Abdomen is flat. There is no distension.      Tenderness: There is no abdominal tenderness.      Comments: Midline incision well approximated and healing well, ostomy pink and patent   Skin:     General: Skin is warm and dry.      Capillary Refill: Capillary refill takes less than 2 seconds.   Neurological:      General: No focal deficit present.      Mental Status: She is alert and oriented to person, place, and time.   Psychiatric:         Mood and Affect: Mood normal.         Behavior: Behavior normal.         Labs:          Recent Labs     10/17/21  0332 10/18/21  0305   SODIUM 150* 144   POTASSIUM 3.6 3.9   CHLORIDE 112 107   CO2 34* 32   BUN 33* 22   CREATININE 0.85 0.67   MAGNESIUM 1.9 1.7   PHOSPHORUS 1.9* 1.6*   CALCIUM 9.1 8.7     Recent Labs     10/17/21  0332 10/18/21  0305   GLUCOSE 167* 170*     Recent Labs     10/17/21  0332 10/18/21  0305   RBC 4.14* 3.56*   HEMOGLOBIN 11.0* 9.6*   HEMATOCRIT 37.5 32.5*   PLATELETCT 177  228     Recent Labs     10/17/21  0332 10/18/21  0305   WBC 6.8 9.3   NEUTSPOLYS 83.00* 84.20*   LYMPHOCYTES 6.30* 10.50*   MONOCYTES 7.00 3.50   EOSINOPHILS 1.90 0.90   BASOPHILS 0.30 0.00     Hemodynamics:  Temp (24hrs), Av.8 °C (98.2 °F), Min:36.5 °C (97.7 °F), Max:37.1 °C (98.8 °F)  Temperature: 36.7 °C (98.1 °F)  Pulse  Av.2  Min: 72  Max: 127   Blood Pressure : 117/72     Respiratory:    Respiration: 16, Pulse Oximetry: 96 %     Given By:: Mouthpiece, Work Of Breathing / Effort: Within Normal Limits  RUL Breath Sounds: Diminished, RML Breath Sounds: Diminished, RLL Breath Sounds: Diminished, EARNEST Breath Sounds: Diminished, LLL Breath Sounds: Diminished  Fluids:    Intake/Output Summary (Last 24 hours) at 10/19/2021 0851  Last data filed at 10/19/2021 0100  Gross per 24 hour   Intake --   Output 1275 ml   Net -1275 ml        GI/Nutrition:  Orders Placed This Encounter   Procedures   • Diet Order Diet: Low Fiber(GI Soft)     Standing Status:   Standing     Number of Occurrences:   1     Order Specific Question:   Diet:     Answer:   Low Fiber(GI Soft) [2]     Medical Decision Making, by Problem:  Active Hospital Problems    Diagnosis    • *Small bowel obstruction (HCC) [K56.609]    • History of pulmonary embolus (PE) [Z86.711]    • 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 Ostomy functioning well, wounds healing well. Some loss of appetite expected but this is to an unusual degree. Could consider some appetite stimulants such as Megace.   Agree with palliative consult.     Quality-Core Measures

## 2021-10-20 ENCOUNTER — APPOINTMENT (OUTPATIENT)
Dept: ONCOLOGY | Facility: MEDICAL CENTER | Age: 81
End: 2021-10-20
Attending: INTERNAL MEDICINE
Payer: MEDICARE

## 2021-10-20 PROBLEM — T81.89XA PROBLEM INVOLVING SURGICAL INCISION: Status: ACTIVE | Noted: 2021-10-20

## 2021-10-20 PROBLEM — Z71.89 GOALS OF CARE, COUNSELING/DISCUSSION: Status: ACTIVE | Noted: 2021-10-20

## 2021-10-20 PROCEDURE — 99231 SBSQ HOSP IP/OBS SF/LOW 25: CPT | Performed by: INTERNAL MEDICINE

## 2021-10-20 PROCEDURE — A9270 NON-COVERED ITEM OR SERVICE: HCPCS | Performed by: STUDENT IN AN ORGANIZED HEALTH CARE EDUCATION/TRAINING PROGRAM

## 2021-10-20 PROCEDURE — 700102 HCHG RX REV CODE 250 W/ 637 OVERRIDE(OP): Performed by: STUDENT IN AN ORGANIZED HEALTH CARE EDUCATION/TRAINING PROGRAM

## 2021-10-20 PROCEDURE — 99497 ADVNCD CARE PLAN 30 MIN: CPT | Performed by: STUDENT IN AN ORGANIZED HEALTH CARE EDUCATION/TRAINING PROGRAM

## 2021-10-20 PROCEDURE — 99024 POSTOP FOLLOW-UP VISIT: CPT | Performed by: SURGERY

## 2021-10-20 PROCEDURE — 700101 HCHG RX REV CODE 250: Performed by: INTERNAL MEDICINE

## 2021-10-20 PROCEDURE — 99232 SBSQ HOSP IP/OBS MODERATE 35: CPT | Mod: 25 | Performed by: STUDENT IN AN ORGANIZED HEALTH CARE EDUCATION/TRAINING PROGRAM

## 2021-10-20 PROCEDURE — 99498 ADVNCD CARE PLAN ADDL 30 MIN: CPT | Performed by: STUDENT IN AN ORGANIZED HEALTH CARE EDUCATION/TRAINING PROGRAM

## 2021-10-20 PROCEDURE — 770004 HCHG ROOM/CARE - ONCOLOGY PRIVATE *

## 2021-10-20 PROCEDURE — 94640 AIRWAY INHALATION TREATMENT: CPT

## 2021-10-20 RX ADMIN — SODIUM CHLORIDE 4 ML: 7 NEBU SOLN,3 % NEBU at 07:40

## 2021-10-20 RX ADMIN — GUAIFENESIN AND DEXTROMETHORPHAN 5 ML: 100; 10 SYRUP ORAL at 16:55

## 2021-10-20 RX ADMIN — OXYCODONE 5 MG: 5 TABLET ORAL at 18:50

## 2021-10-20 RX ADMIN — RIVAROXABAN 20 MG: 20 TABLET, FILM COATED ORAL at 18:50

## 2021-10-20 RX ADMIN — ALBUTEROL SULFATE 2.5 MG: 2.5 SOLUTION RESPIRATORY (INHALATION) at 21:06

## 2021-10-20 RX ADMIN — OXYCODONE 5 MG: 5 TABLET ORAL at 10:10

## 2021-10-20 RX ADMIN — ALBUTEROL SULFATE 2.5 MG: 2.5 SOLUTION RESPIRATORY (INHALATION) at 07:40

## 2021-10-20 RX ADMIN — OXYCODONE 5 MG: 5 TABLET ORAL at 15:50

## 2021-10-20 ASSESSMENT — ENCOUNTER SYMPTOMS
HEADACHES: 0
BACK PAIN: 0
NERVOUS/ANXIOUS: 1
ABDOMINAL PAIN: 1
VOMITING: 0
EYE PAIN: 0
CHILLS: 0
DIARRHEA: 0
NAUSEA: 0
BRUISES/BLEEDS EASILY: 0
SORE THROAT: 0
FEVER: 0
NECK PAIN: 0
WEAKNESS: 1
COUGH: 1
SHORTNESS OF BREATH: 1
DIZZINESS: 0
DEPRESSION: 1
FLANK PAIN: 0
SPUTUM PRODUCTION: 1
WEIGHT LOSS: 1
SINUS PAIN: 0
BLOOD IN STOOL: 0
MYALGIAS: 0
CONSTIPATION: 0

## 2021-10-20 ASSESSMENT — PAIN DESCRIPTION - PAIN TYPE
TYPE: SURGICAL PAIN

## 2021-10-20 NOTE — ASSESSMENT & PLAN NOTE
Follows with Dr. Bello  Inconsistent functional ability for treatment plan  Oncology has met with patient this admission. She is no longer a candidate for antineoplastic therapy and recommended hospice.  Palliative evaluated, plan is to discharge the patient on home hospice.  CODE STATUS changed to DNI/DN AR

## 2021-10-20 NOTE — PROGRESS NOTES
81 female with metastatic colorectal cancer admitted with SBO requiring surgery. Patient visited. She is really now Dr. Bello's patient but she was on my clinic schedule so I came to see her. She is profoundly weak, chronically SOB and s/p 9 years of chemotherapy. I think it's time to switch gears to comfort measures. Discussed with Dr. Bello and Dr. Donahue who agree. The  tells me they have lots of family support home in [place]. Thanks.

## 2021-10-20 NOTE — FACE TO FACE
Face to Face Note  -  Durable Medical Equipment    Jack Donahue M.D. - NPI: [id number]  I certify that this patient is under my care and that they have had a durable medical equipment(DME)face to face encounter by myself that meets the physician DME face-to-face encounter requirements with this patient on:    Date of encounter:   Patient:                    MRN:                       YOB: 2021  Karelne Walters  [id number]  1940     The encounter with the patient was in whole, or in part, for the following medical condition, which is the primary reason for durable medical equipment:  Other - Metastatic Colorectal Cancer    I certify that, based on my findings, the following durable medical equipment is medically necessary:  Wheel Chair and Suction Machine.    HOME O2 Saturation Measurements:(Values must be present for Home Oxygen orders)         ,     ,         My Clinical findings support the need for the above equipment due to:  Abnormal Gait, Frequent Falls, Other - Thick Respiratory Secretions despite bronchodilators    Supporting Symptoms: Productive cough, weakness, vasovagal syncope     ------------------------------------------------------------------------------------------------------------------    Face to Face Supporting Documentation - Home Health    The encounter with this patient was in whole or in part the primary reason for home health admission.    Date of encounter:   Patient:                    MRN:                       YOB: 2021  Karlene Walters  [id number]  1940     Home health to see patient for:  Skilled Nursing care for assessment, interventions & education, Registered dietitian consult, Physical Therapy evaluation and treatment and Occupational therapy evaluation and treatment    Skilled need for:  Exacerbation of Chronic Disease State Metastatic Colorectal Cancer, Recent Deterioration of Health Status Small Bowel Obstruction,  Severe Protein-Calorie Malnutrition and Medication Management pain and breathing medication education    Skilled nursing interventions to include:  Comment: Vital sign monitoring and symptom education    Homebound evidenced status by:  Need the aid of supportive devices such as crutches, canes, wheelchairs or walkers or Needs the assistance of another person in order to leave the home. Leaving home must require a considerable and taxing effort. There must exist a normal inability to leave the home.    Community Physician to provide follow up care: Manan Quiñonez M.D.     Optional Interventions    Wound information & treatment:    Home Infusion Therapy orders:    Line/Drain/Airway:    I certify the face to face encounter for this home care referral meets the CMS requirements and the encounter/clinical assessment with the patient was, in whole, or in part, for the medical condition(s) listed above, which is the primary reason for home health care. Based on my clinical findings: the service(s) are medically necessary, support the need for home health care, and the homebound criteria are met.  I certify that this patient has had a face to face encounter by myself.  Jack Donahue M.D. - NPI: [id number]    *Debility, frailty and advanced age in the absence of an acute deterioration or exacerbation of a condition do not qualify a patient for home health.

## 2021-10-20 NOTE — CARE PLAN
The patient is Stable - Low risk of patient condition declining or worsening    Shift Goals  Clinical Goals: q2 turns, monitor ostomy output  Patient Goals: Rest  Family Goals: No family present    Progress made toward(s) clinical / shift goals:    Problem: Pain - Standard  Goal: Alleviation of pain or a reduction in pain to the patient’s comfort goal  Outcome: Progressing     Problem: Knowledge Deficit - Standard  Goal: Patient and family/care givers will demonstrate understanding of plan of care, disease process/condition, diagnostic tests and medications  Outcome: Progressing     Problem: Hemodynamics  Goal: Patient's hemodynamics, fluid balance and neurologic status will be stable or improve  Outcome: Progressing     Problem: Fluid Volume  Goal: Fluid volume balance will be maintained  Outcome: Progressing     Problem: Urinary - Renal Perfusion  Goal: Ability to achieve and maintain adequate renal perfusion and functioning will improve  Outcome: Progressing     Problem: Respiratory  Goal: Patient will achieve/maintain optimum respiratory ventilation and gas exchange  Outcome: Progressing     Problem: Physical Regulation  Goal: Diagnostic test results will improve  Outcome: Progressing  Goal: Signs and symptoms of infection will decrease  Outcome: Progressing     Problem: Fall Risk  Goal: Patient will remain free from falls  Outcome: Progressing     Problem: Gastrointestinal Irritability  Goal: Nausea and vomiting will be absent or improve  Outcome: Progressing  Goal: Diarrhea will be absent or improved  Outcome: Progressing       Patient is not progressing towards the following goals:      Problem: Skin Integrity  Goal: Skin integrity is maintained or improved  Outcome: Not Progressing  Note: Patient refusing q2 turns, education provided.      Problem: Mobility  Goal: Patient's capacity to carry out activities will improve  Outcome: Not Progressing  Note: Patient refusing mobilization, education provided.

## 2021-10-20 NOTE — DISCHARGE PLANNING
Care Transition Team Discharge Planning    Anticipated Discharge Disposition: DC home with hospice pending.    Action:Lsw met with the family to discuss hospice referral. The family was also visiting. The family requested information on hospice. Law answered their questions and gave them a flier to review. The family reported that the patient will return home on hospice. Lsw faxed CHOICE form ([employer]) to [facility].     Barriers to Discharge: Awaiting hospice acceptance.    Plan: Lsw will assist medical team with discharge planning.

## 2021-10-20 NOTE — DOCUMENTATION QUERY
"                                                                         [facility]                                                                       Query Response Note      PATIENT:               KANG HALL  ACCT #:                  [id number]  MRN:                     [id number]  :                      1940  ADMIT DATE:       10/10/2021 10:03 PM  DISCH DATE:          RESPONDING  PROVIDER #:        [id number]           QUERY TEXT:    A stage 2 pressure ulcer of the coccyx is documented by the Wound RN on 10/12/21 but was not addressed in the H&P.    Please specify if you:    NOTE:  If an appropriate response is not listed below, please respond with a new note.      The patient's Clinical Indicators include:  A pink, red and excoriated \"Pressure Injury Coccyx POA stage 2\" measuring 1cm x 1cm with attached edges is noted in the Wound Team Note on 10/12/21.    Treatments include: Mepilex and Wound Team Consult.    Risk factors include: dx Sepsis, dx BRIONNA, dx Metastatic Colorectal CA w/Ileostomy Status, and Advanced Age.    Thank you,  Chavez López RN, BSN  Clinical   Connect via [employer] Messenger  Options provided:   -- Agree with Wound Care RN assessment   -- Disagree with Wound Care RN assessment   -- Unable to determine      Query created by: Chavez López on 10/13/2021 6:15 AM    RESPONSE TEXT:    Unable to determine          Electronically signed by:  KAROLINA STOVER MD 10/19/2021 11:35 PM              "

## 2021-10-20 NOTE — DISCHARGE PLANNING
Received Choice form at 1210  Agency/Facility Name: [facility]  Referral sent per Choice form at 1215    Received Choice form at 1210  Ag[facility]Fac[facility] Name: [facility]  Referral sent per Choice form at 1218    1315- Per right fax, [facility]  declined / not enough staffing    1520- Spoke To: Lisseth  Agency/Facility Name: [facility]  Plan or Request: staffing low, per Lisseth, might be able to take a new pt in a couple weeks.

## 2021-10-20 NOTE — PROGRESS NOTES
[facility] Medicine Daily Progress Note    Date of Service  10/20/2021    Chief Complaint  Karlene Walters is a 81 y.o. female admitted 10/10/2021 with small bowel obstruction, nausea, vomiting    Hospital Course  No notes on file    Ms. Karlene Walters is a 81 y.o. 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  He did improve his showed small bowel obstruction.  General surgery was consulted with NG tube placement and bowel rest.  Status post lysis of adhesions and repair of enterotomy on 10/13.  NG tube was discontinued on 10/16 and diet was advanced.      Interval Problem Update  YULI ON  She continues to eat and have ileostomy output.  She continues to have abdominal pain.  Her RN noticed leakage from the superior margin of the incision, concerning for ostomy leak.  Her breathing is still uncomfortable even with nebulizer. She has productive cough.  She has been unable to walk because her legs are too weak. She was able to briefly stand at the side of the bed to get a standing weight.  She is depressed at being alone in the hospital and nervous about going home.  She denies bleeding, F/C, bladder dysfunction.    I met with her, her , her daughter, her MAXWELL, and primary RN Ashlie. We reviewed her preference to return home and follow up with her oncologist for further treatment. We discussed the logistics of getting into an  or home with home health. She has been unable to stand or walk with family and RN assistance. She has become too debilitated and is no longer a candidate for further treatment, which was confirmed with Dr. Steele and Dr. Bello. I shared her guarded prospects for meaningful rehabilitation nor functional improvement. I advised that she is approaching the end of her life. If her goal is to return  "home rather than live in a SNF, she requires hospice to provide the caregiver support needed for her EOL care at home. Family is supportive of her enrolling in hospice and they are willing to assist her and her  for the care she requires. We reviewed that there may only be \"hospice-like\" care in [place], for which we will provide all DME and enroll in HH for discharge and coordinate her outpatient medications with her PCP. She is agreeable to a hospice consultation to learn more about enrollment and hospice-like care. I spent 65 minutes in  advanced care planning.    I have personally seen and examined the patient at bedside. I discussed the plan of care with patient, family, bedside RN, charge RN,  and pharmacy.    POC discussed with surgeon Dr. Baxter. Ostomy site is leaking around the incision. She advised wound care re-evaluation.    POC discussed with oncologists Dr. Steele and Dr. Bello. They agreed with hospice consultation and determined that she is no longer a candidate for antineoplastic therapy.    Consultants/Specialty  general surgery and oncology    Code Status  Full Code    Disposition  Patient is medically cleared.   Anticipate discharge to to hospice.  I have placed the appropriate orders for post-discharge needs.    Review of Systems  Review of Systems   Constitutional: Positive for weight loss. Negative for chills and fever.   HENT: Negative for ear pain, nosebleeds, sinus pain and sore throat.    Eyes: Negative for pain.   Respiratory: Positive for cough, sputum production and shortness of breath.    Cardiovascular: Negative for chest pain and leg swelling.   Gastrointestinal: Positive for abdominal pain. Negative for blood in stool, constipation, diarrhea, melena, nausea and vomiting.   Genitourinary: Negative for dysuria, flank pain and hematuria.   Musculoskeletal: Negative for back pain, joint pain, myalgias and neck pain.   Skin: Negative for rash.   Neurological: " Positive for weakness. Negative for dizziness and headaches.   Endo/Heme/Allergies: Does not bruise/bleed easily.   Psychiatric/Behavioral: Positive for depression. The patient is nervous/anxious.         Physical Exam  Temp:  [36.4 °C (97.5 °F)-37.2 °C (98.9 °F)] 36.4 °C (97.5 °F)  Pulse:  [102-122] 122  Resp:  [16-20] 16  BP: (114-130)/(75-82) 114/77  SpO2:  [94 %-99 %] 94 %    Physical Exam  Vitals and nursing note reviewed. Exam conducted with a chaperone present (, daughter, and MAXWELL at bedside).   Constitutional:       Appearance: Normal appearance. She is ill-appearing.      Interventions: Nasal cannula in place.   HENT:      Head:      Comments: Bitemporal wasting     Nose: Nose normal.      Mouth/Throat:      Mouth: Mucous membranes are moist.   Eyes:      General: No scleral icterus.     Conjunctiva/sclera: Conjunctivae normal.   Cardiovascular:      Rate and Rhythm: Normal rate and regular rhythm.      Pulses: Normal pulses.      Heart sounds: Normal heart sounds. No murmur heard.   No friction rub. No gallop.    Pulmonary:      Effort: Pulmonary effort is normal. No respiratory distress.      Breath sounds: Normal breath sounds. No wheezing, rhonchi or rales.   Abdominal:      General: Bowel sounds are decreased. There is distension.      Palpations: There is no mass.      Tenderness: There is abdominal tenderness.      Comments: Laparotomy incision stapled, minimally tender, nonerythematous. Brown liquid oozing from superior margin of incision.   Genitourinary:     Comments: +Ileostomy with yellow liquid output and gas  Musculoskeletal:      Cervical back: Neck supple.      Right lower leg: No edema.      Left lower leg: No edema.   Skin:     General: Skin is warm and dry.   Neurological:      Mental Status: She is alert.      Comments: Appropriately conversant   Psychiatric:         Attention and Perception: Attention and perception normal.         Mood and Affect: Affect normal. Mood is anxious  and depressed.         Speech: Speech normal.         Behavior: Behavior normal. Behavior is cooperative.         Thought Content: Thought content normal.         Cognition and Memory: Cognition and memory normal.         Judgment: Judgment normal.         Fluids    Intake/Output Summary (Last 24 hours) at 10/20/2021 1655  Last data filed at 10/20/2021 1503  Gross per 24 hour   Intake --   Output 1625 ml   Net -1625 ml       Laboratory  Recent Labs     10/18/21  0305   WBC 9.3   RBC 3.56*   HEMOGLOBIN 9.6*   HEMATOCRIT 32.5*   MCV 91.3   MCH 27.0   MCHC 29.5*   RDW 67.2*   PLATELETCT 228   MPV 10.0     Recent Labs     10/18/21  0305   SODIUM 144   POTASSIUM 3.9   CHLORIDE 107   CO2 32   GLUCOSE 170*   BUN 22   CREATININE 0.67   CALCIUM 8.7                   Imaging  DX-ABDOMEN FOR TUBE PLACEMENT   Final Result      Enteric tube tip projects over the stomach      CT-ABDOMEN-PELVIS WITH   Final Result      1.  Small bowel obstruction, with transition point is in the pelvis, 20-30 cm proximal to the ileostomy. This is most likely due to adhesions and less likely due to internal hernia.   2.  Posttreatment changes in the right hepatic dome, with a 10.1 cm multiloculated fluid collection at the site of prior ablation. Infected collection is unlikely in the absence of internal pockets of air, but can be considered in the appropriate    clinical settings.   3.  No definitive evidence of abdominopelvic metastatic disease. Evaluation for liver metastases is difficult on this single phase study.   4.  Bronchiectasis/scarring in the lower lobes, right worse than left.      DX-CHEST-PORTABLE (1 VIEW)   Final Result      Right basilar atelectasis. No focal consolidation. No effusions.           Assessment/Plan  * Small bowel obstruction (HCC)- (present on admission)  Assessment & Plan  Resolved - tolerating PO with output from ileostomy  CT A/P demonstrated SBO  General Surgery consulted, nonoperative management  NGT  "discontinued and N/V resolved  Advance diet as tolerated  Scheduled APAP and low-dose oxycodone for pain control    Goals of care, counseling/discussion  Assessment & Plan  ECOG 3-4  Debilitated including severe protein-calorie malnutrition, ataxia, SBO, pressure injury  Unable to participate with PT/OT, poor rehabilitation prospects  No longer a candidate for antineoplastic therapy per [facility] On[facility]  Her wishes are to return home and not live in a SNF  Hospice consultation ordered, limited prospects in [place]  May require \"hospice-like care\", which will involve DME and Home Health coordination with Case Management  Unable to address code status today, will revisit tomorrow    Problem involving surgical incision  Assessment & Plan  Drainage of what appears to be stool from superior margin of laparotomy incision  Wound care consulted, images uploaded into media tab  Surgeon notified of concern for leak, advised wound care evaluation    Sepsis (HCC)- (present on admission)  Assessment & Plan  Resolved    This is Sepsis Present on admission  SIRS criteria identified on my evaluation include: Tachycardia, with heart rate greater than 90 BPM, Tachypnea, with respirations greater than 20 per minute and Leukocytosis, with WBC greater than 12,000  Source is intra-abdominal.  On exploratory laparotomy showed perforation  Sepsis protocol initiated  Fluid resuscitation ordered per protocol  IV antibiotics as appropriate for source of sepsis  While organ dysfunction may be noted elsewhere in this problem list or in the chart, degree of organ dysfunction does not meet CMS criteria for severe sepsis.      Procalcitonin ordered and minimally elevated 0.55.    Blood cultures x2, sputum culture for cough with sputum production.    Flagyl and ceftriaxone as IV antibiotic for 5-day course    Metastatic colorectal cancer (HCC)- (present on admission)  Assessment & Plan  See plan for malignant neoplasm of rectum    Metabolic " "acidosis, increased anion gap (IAG)- (present on admission)  Assessment & Plan  Resolved with IVF  Likely lactic acidosis from SBO    Cough with sputum- (present on admission)  Assessment & Plan  CXR revealed basilar atelectasis  Procalcitonin elevated, completed 5 days of ceftriaxone which would cover CAP  PRN antitussives    Hyponatremia- (present on admission)  Assessment & Plan  Resolved with 1/2 NS  Discontinued IVF as she is tolerating PO    BRIONNA (acute kidney injury) (HCC)- (present on admission)  Assessment & Plan  Resolved with IVF  IVF discontinued due to resolution of SBO and tolerating PO    History of pulmonary embolus (PE)- (present on admission)  Assessment & Plan  Restart xarelto    Secondary malignant neoplasm of liver (HCC)- (present on admission)  Assessment & Plan  See plan for malignant neoplasm of rectum    Malignant neoplasm of rectum (HCC)- (present on admission)  Assessment & Plan  Follows with Dr. Bello  Inconsistent functional ability for treatment plan  Oncology has met with patient this admission. She is no longer a candidate for antineoplastic therapy and recommended hospice.  Hospice consultation and coordination of \"hospice-like\" care if necessary       VTE prophylaxis: therapeutic anticoagulation with xarelto    I have performed a physical exam and reviewed and updated ROS and Plan today (10/20/2021). In review of yesterday's note (10/19/2021), there are no changes except as documented above.      "

## 2021-10-20 NOTE — PROGRESS NOTES
Acute care surgery progress Note               Author: Jannet Baxter M.D. Date & Time created: 10/20/2021  1:18 PM     Interval Histor  Feeling better today. still with a low appetite.  Review of Systems:  Review of Systems   Constitutional: Negative for chills and fever.   Gastrointestinal: Positive for abdominal pain. Negative for nausea and vomiting.       Physical Exam:  Physical Exam  Constitutional:       General: She is not in acute distress.     Appearance: Normal appearance. She is not ill-appearing.   HENT:      Head: Normocephalic and atraumatic.      Mouth/Throat:      Mouth: Mucous membranes are moist.   Eyes:      Extraocular Movements: Extraocular movements intact.   Cardiovascular:      Pulses: Normal pulses.   Pulmonary:      Effort: Pulmonary effort is normal.   Abdominal:      General: Abdomen is flat. There is no distension.      Tenderness: There is no abdominal tenderness.      Comments: Incision clean, dry and intact. Ostomy functioning well.    Skin:     General: Skin is warm and dry.      Capillary Refill: Capillary refill takes less than 2 seconds.   Neurological:      Mental Status: She is alert.         Labs:          Recent Labs     10/18/21  0305   SODIUM 144   POTASSIUM 3.9   CHLORIDE 107   CO2 32   BUN 22   CREATININE 0.67   MAGNESIUM 1.7   PHOSPHORUS 1.6*   CALCIUM 8.7     Recent Labs     10/18/21  0305   GLUCOSE 170*     Recent Labs     10/18/21  0305   RBC 3.56*   HEMOGLOBIN 9.6*   HEMATOCRIT 32.5*   PLATELETCT 228     Recent Labs     10/18/21  0305   WBC 9.3   NEUTSPOLYS 84.20*   LYMPHOCYTES 10.50*   MONOCYTES 3.50   EOSINOPHILS 0.90   BASOPHILS 0.00     Hemodynamics:  Temp (24hrs), Av.9 °C (98.4 °F), Min:36.4 °C (97.5 °F), Max:37.2 °C (98.9 °F)  Temperature: 36.4 °C (97.5 °F)  Pulse  Av  Min: 72  Max: 127   Blood Pressure : 120/78     Respiratory:    Respiration: 20, Pulse Oximetry: 96 %     Given By:: Mouthpiece, Work Of Breathing / Effort: Mild  RUL Breath  Sounds: Coarse Crackles, RML Breath Sounds: Coarse Crackles, RLL Breath Sounds: Coarse Crackles, EARNEST Breath Sounds: Expiratory Wheezes;Coarse Crackles, LLL Breath Sounds: Coarse Crackles  Fluids:    Intake/Output Summary (Last 24 hours) at 10/20/2021 1318  Last data filed at 10/20/2021 0600  Gross per 24 hour   Intake --   Output 1550 ml   Net -1550 ml        GI/Nutrition:  Orders Placed This Encounter   Procedures   • Diet Order Diet: Low Fiber(GI Soft)     Standing Status:   Standing     Number of Occurrences:   1     Order Specific Question:   Diet:     Answer:   Low Fiber(GI Soft) [2]     Medical Decision Making, by Problem:  Active Hospital Problems    Diagnosis    • *Small bowel obstruction (HCC) [K56.609]    • History of pulmonary embolus (PE) [Z86.711]    • Secondary malignant neoplasm of liver (HCC) [C78.7]    • Cough with sputum [R05.8]    • Sepsis (HCC) [A41.9]    • Metabolic acidosis, increased anion gap (IAG) [E87.2]    • Metastatic colorectal cancer (HCC) [C19]    • BRIONNA (acute kidney injury) (HCC) [N17.9]    • Hyponatremia [E87.1]    • Malignant neoplasm of rectum (HCC) [C20]        Plan:  Diet as tolerated.   Will need to follow up for staple removal in 1 week.   No objection to discharge from acute care setting from the surgical standpoint.     Quality-Core Measures

## 2021-10-20 NOTE — PROGRESS NOTES
"Hospital Medicine Daily Progress Note    Date of Service  10/19/2021    Chief Complaint  Karlene Walters is a 81 y.o. female admitted 10/10/2021 with small bowel obstruction, nausea, vomiting    Hospital Course  No notes on file    Ms. Karlene Walters is a 81 y.o. 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  He did improve his showed small bowel obstruction.  General surgery was consulted with NG tube placement and bowel rest.  Status post lysis of adhesions and repair of enterotomy on 10/13.  NG tube was discontinued on 10/16 and diet was advanced.      Interval Problem Update  YULI ON  She is able to eat more and having lots of gas and output from her ileostomy.  She denies N/V  She has been limited in PT due to being \"pushed\" and causing abdominal pain.  I reviewed their recommendation, and she is not interested in post-acute placement.  She feels that if her pain were controlled she could manage at home with her walker and her .  She wished to follow up with Dr. Bello next week.  She denies other pain, bleeding, dysphoria, F/C, bladder dysfunction.    I advised her that SBO has resolved based on her ability to eat and have gas/output from her ileostomy. She will ambulate with her  for determination of discharge needs.    I have personally seen and examined the patient at bedside. I discussed the plan of care with patient, family, bedside RN, charge RN,  and pharmacy.     Consultants/Specialty  general surgery and oncology    Code Status  Full Code    Disposition  Patient is medically cleared.   Anticipate discharge to to home with close outpatient follow-up.  I have placed the appropriate orders for post-discharge needs.    Review of Systems  Review of Systems   Constitutional: Positive " for weight loss. Negative for chills and fever.   HENT: Negative for ear pain, nosebleeds, sinus pain and sore throat.    Eyes: Negative for pain.   Respiratory: Negative for cough and shortness of breath.    Cardiovascular: Negative for chest pain and leg swelling.   Gastrointestinal: Positive for abdominal pain. Negative for blood in stool, constipation, diarrhea, melena, nausea and vomiting.   Genitourinary: Negative for dysuria, flank pain and hematuria.   Musculoskeletal: Negative for back pain, joint pain, myalgias and neck pain.   Skin: Negative for rash.   Neurological: Positive for weakness. Negative for dizziness and headaches.   Endo/Heme/Allergies: Does not bruise/bleed easily.   Psychiatric/Behavioral: Negative for depression. The patient is not nervous/anxious.    All other systems reviewed and are negative.       Physical Exam  Temp:  [36.5 °C (97.7 °F)-37.2 °C (98.9 °F)] 37.2 °C (98.9 °F)  Pulse:  [] 111  Resp:  [16-18] 18  BP: ()/(52-75) 116/75  SpO2:  [96 %-99 %] 99 %    Physical Exam  Vitals and nursing note reviewed. Exam conducted with a chaperone present ( at bedside).   Constitutional:       Appearance: Normal appearance. She is ill-appearing.      Interventions: Nasal cannula in place.   HENT:      Head:      Comments: Bitemporal wasting     Nose: Nose normal.      Mouth/Throat:      Mouth: Mucous membranes are moist.   Eyes:      General: No scleral icterus.     Conjunctiva/sclera: Conjunctivae normal.   Cardiovascular:      Rate and Rhythm: Normal rate and regular rhythm.      Pulses: Normal pulses.      Heart sounds: Normal heart sounds. No murmur heard.   No friction rub. No gallop.    Pulmonary:      Effort: Pulmonary effort is normal. No respiratory distress.      Breath sounds: Normal breath sounds. No wheezing, rhonchi or rales.   Abdominal:      General: Bowel sounds are decreased. There is distension.      Palpations: There is no mass.      Tenderness: There is  abdominal tenderness.   Genitourinary:     Comments: +Ileostomy with yellow liquid output and gas  Musculoskeletal:      Cervical back: Neck supple.      Right lower leg: No edema.      Left lower leg: No edema.   Skin:     General: Skin is warm and dry.   Neurological:      Mental Status: She is alert.      Comments: Appropriately conversant   Psychiatric:         Attention and Perception: Attention and perception normal.         Mood and Affect: Mood and affect normal.         Speech: Speech normal.         Behavior: Behavior normal. Behavior is cooperative.         Thought Content: Thought content normal.         Cognition and Memory: Cognition and memory normal.         Judgment: Judgment normal.         Fluids    Intake/Output Summary (Last 24 hours) at 10/19/2021 1737  Last data filed at 10/19/2021 1600  Gross per 24 hour   Intake --   Output 2175 ml   Net -2175 ml       Laboratory  Recent Labs     10/17/21  0332 10/18/21  0305   WBC 6.8 9.3   RBC 4.14* 3.56*   HEMOGLOBIN 11.0* 9.6*   HEMATOCRIT 37.5 32.5*   MCV 90.6 91.3   MCH 26.6* 27.0   MCHC 29.3* 29.5*   RDW 67.3* 67.2*   PLATELETCT 177 228   MPV 9.5 10.0     Recent Labs     10/17/21  0332 10/18/21  0305   SODIUM 150* 144   POTASSIUM 3.6 3.9   CHLORIDE 112 107   CO2 34* 32   GLUCOSE 167* 170*   BUN 33* 22   CREATININE 0.85 0.67   CALCIUM 9.1 8.7                   Imaging  DX-ABDOMEN FOR TUBE PLACEMENT   Final Result      Enteric tube tip projects over the stomach      CT-ABDOMEN-PELVIS WITH   Final Result      1.  Small bowel obstruction, with transition point is in the pelvis, 20-30 cm proximal to the ileostomy. This is most likely due to adhesions and less likely due to internal hernia.   2.  Posttreatment changes in the right hepatic dome, with a 10.1 cm multiloculated fluid collection at the site of prior ablation. Infected collection is unlikely in the absence of internal pockets of air, but can be considered in the appropriate    clinical settings.    3.  No definitive evidence of abdominopelvic metastatic disease. Evaluation for liver metastases is difficult on this single phase study.   4.  Bronchiectasis/scarring in the lower lobes, right worse than left.      DX-CHEST-PORTABLE (1 VIEW)   Final Result      Right basilar atelectasis. No focal consolidation. No effusions.           Assessment/Plan  * Small bowel obstruction (HCC)- (present on admission)  Assessment & Plan  Resolved - tolerating PO with output from ileostomy  CT A/P demonstrated SBO  General Surgery consulted, nonoperative management  NGT discontinued and N/V resolved  Advance diet as tolerated  Scheduled APAP and low-dose oxycodone for pain control    Sepsis (HCC)- (present on admission)  Assessment & Plan  Resolved    This is Sepsis Present on admission  SIRS criteria identified on my evaluation include: Tachycardia, with heart rate greater than 90 BPM, Tachypnea, with respirations greater than 20 per minute and Leukocytosis, with WBC greater than 12,000  Source is intra-abdominal.  On exploratory laparotomy showed perforation  Sepsis protocol initiated  Fluid resuscitation ordered per protocol  IV antibiotics as appropriate for source of sepsis  While organ dysfunction may be noted elsewhere in this problem list or in the chart, degree of organ dysfunction does not meet CMS criteria for severe sepsis.      Procalcitonin ordered and minimally elevated 0.55.    Blood cultures x2, sputum culture for cough with sputum production.    Flagyl and ceftriaxone as IV antibiotic for 5-day course    Metastatic colorectal cancer (HCC)- (present on admission)  Assessment & Plan  See plan for malignant neoplasm of rectum    Metabolic acidosis, increased anion gap (IAG)- (present on admission)  Assessment & Plan  Resolved with IVF  Likely lactic acidosis from SBO    Cough with sputum- (present on admission)  Assessment & Plan  CXR revealed basilar atelectasis  Procalcitonin elevated, completed 5 days of  ceftriaxone which would cover CAP  PRN antitussives    Hyponatremia- (present on admission)  Assessment & Plan  Resolved with 1/2 NS  Discontinued IVF as she is tolerating PO    BRIONNA (acute kidney injury) (HCC)- (present on admission)  Assessment & Plan  Resolved with IVF  IVF discontinued due to resolution of SBO and tolerating PO    History of pulmonary embolus (PE)- (present on admission)  Assessment & Plan  Restart xarelto    Secondary malignant neoplasm of liver (HCC)- (present on admission)  Assessment & Plan  See plan for malignant neoplasm of rectum    Malignant neoplasm of rectum (HCC)- (present on admission)  Assessment & Plan  Follows with Dr. Bello  Inconsistent functional ability for treatment plan  GOC are to continue treatment at this time, follow up with Dr. Bello after discharge       VTE prophylaxis: heparin ppx    I have performed a physical exam and reviewed and updated ROS and Plan today (10/19/2021). In review of yesterday's note (10/18/2021), there are no changes except as documented above.

## 2021-10-21 PROCEDURE — 99232 SBSQ HOSP IP/OBS MODERATE 35: CPT | Performed by: HOSPITALIST

## 2021-10-21 PROCEDURE — 700102 HCHG RX REV CODE 250 W/ 637 OVERRIDE(OP): Performed by: STUDENT IN AN ORGANIZED HEALTH CARE EDUCATION/TRAINING PROGRAM

## 2021-10-21 PROCEDURE — 302098 PASTE RING (FLAT): Performed by: HOSPITALIST

## 2021-10-21 PROCEDURE — 700101 HCHG RX REV CODE 250: Performed by: INTERNAL MEDICINE

## 2021-10-21 PROCEDURE — A9270 NON-COVERED ITEM OR SERVICE: HCPCS | Performed by: STUDENT IN AN ORGANIZED HEALTH CARE EDUCATION/TRAINING PROGRAM

## 2021-10-21 PROCEDURE — 94640 AIRWAY INHALATION TREATMENT: CPT

## 2021-10-21 PROCEDURE — 99024 POSTOP FOLLOW-UP VISIT: CPT | Performed by: SURGERY

## 2021-10-21 PROCEDURE — RXMED WILLOW AMBULATORY MEDICATION CHARGE: Performed by: HOSPITALIST

## 2021-10-21 PROCEDURE — 770004 HCHG ROOM/CARE - ONCOLOGY PRIVATE *

## 2021-10-21 PROCEDURE — 94760 N-INVAS EAR/PLS OXIMETRY 1: CPT

## 2021-10-21 PROCEDURE — 97530 THERAPEUTIC ACTIVITIES: CPT | Mod: CQ

## 2021-10-21 RX ORDER — LANOLIN ALCOHOL/MO/W.PET/CERES
100 CREAM (GRAM) TOPICAL DAILY
Qty: 30 TABLET | Refills: 0 | Status: SHIPPED | OUTPATIENT
Start: 2021-10-22

## 2021-10-21 RX ORDER — OXYCODONE HYDROCHLORIDE 5 MG/1
5 TABLET ORAL EVERY 8 HOURS PRN
Qty: 21 TABLET | Refills: 0 | Status: SHIPPED | OUTPATIENT
Start: 2021-10-21 | End: 2021-10-29

## 2021-10-21 RX ORDER — POLYETHYLENE GLYCOL 3350 17 G/17G
17 POWDER, FOR SOLUTION ORAL
Qty: 30 EACH | Refills: 0 | Status: SHIPPED | OUTPATIENT
Start: 2021-10-21 | End: 2021-11-21

## 2021-10-21 RX ADMIN — OXYCODONE 5 MG: 5 TABLET ORAL at 15:57

## 2021-10-21 RX ADMIN — RIVAROXABAN 10 MG: 10 TABLET, FILM COATED ORAL at 18:07

## 2021-10-21 RX ADMIN — GUAIFENESIN AND DEXTROMETHORPHAN 5 ML: 100; 10 SYRUP ORAL at 07:18

## 2021-10-21 RX ADMIN — ALBUTEROL SULFATE 2.5 MG: 2.5 SOLUTION RESPIRATORY (INHALATION) at 20:39

## 2021-10-21 RX ADMIN — GUAIFENESIN AND DEXTROMETHORPHAN 5 ML: 100; 10 SYRUP ORAL at 15:59

## 2021-10-21 RX ADMIN — OXYCODONE 5 MG: 5 TABLET ORAL at 12:17

## 2021-10-21 RX ADMIN — OXYCODONE 5 MG: 5 TABLET ORAL at 07:42

## 2021-10-21 RX ADMIN — SODIUM CHLORIDE 4 ML: 7 NEBU SOLN,3 % NEBU at 07:41

## 2021-10-21 RX ADMIN — ALBUTEROL SULFATE 2.5 MG: 2.5 SOLUTION RESPIRATORY (INHALATION) at 07:41

## 2021-10-21 ASSESSMENT — ENCOUNTER SYMPTOMS
EYE PAIN: 0
DEPRESSION: 1
WEAKNESS: 1
COUGH: 1
WEIGHT LOSS: 1
NAUSEA: 0
BACK PAIN: 0
CHILLS: 0
HEMOPTYSIS: 0
FEVER: 0
ABDOMINAL PAIN: 1
NERVOUS/ANXIOUS: 1
DIZZINESS: 0
DIARRHEA: 0
VOMITING: 0
MYALGIAS: 0
SPUTUM PRODUCTION: 1
SORE THROAT: 0
SHORTNESS OF BREATH: 1

## 2021-10-21 ASSESSMENT — GAIT ASSESSMENTS
GAIT LEVEL OF ASSIST: MINIMAL ASSIST
ASSISTIVE DEVICE: FRONT WHEEL WALKER
DISTANCE (FEET): 2

## 2021-10-21 ASSESSMENT — PAIN DESCRIPTION - PAIN TYPE
TYPE: SURGICAL PAIN

## 2021-10-21 ASSESSMENT — COGNITIVE AND FUNCTIONAL STATUS - GENERAL
WALKING IN HOSPITAL ROOM: A LOT
MOVING FROM LYING ON BACK TO SITTING ON SIDE OF FLAT BED: UNABLE
TURNING FROM BACK TO SIDE WHILE IN FLAT BAD: UNABLE
STANDING UP FROM CHAIR USING ARMS: A LITTLE
MOBILITY SCORE: 9
SUGGESTED CMS G CODE MODIFIER MOBILITY: CM
CLIMB 3 TO 5 STEPS WITH RAILING: TOTAL
MOVING TO AND FROM BED TO CHAIR: UNABLE

## 2021-10-21 NOTE — ASSESSMENT & PLAN NOTE
Patient is being followed by palliative/hospice.  Plan is to discharge home on home hospice after an extensive discussion

## 2021-10-21 NOTE — CARE PLAN
"The patient is Watcher - Medium risk of patient condition declining or worsening    Shift Goals  Clinical Goals: up to chair, skin integrity  Patient Goals: pain control  Family Goals: No family present    Progress made toward(s) clinical / shift goals:  see cp    Patient is not progressing towards the following goals:      Problem: Skin Integrity  Goal: Skin integrity is maintained or improved  Outcome: Not Progressing  Note: New wound photos taken, sores to buttocks. Wound care given. Pt understands she must be turned q-2 and should not refuse repositioning. This has been reinforced by family at bedside.      Problem: Mobility  Goal: Patient's capacity to carry out activities will improve  Outcome: Not Progressing  Note: Pt agreeable to getting up to chair today. PT/OT consult ordered. Pt with difficulty ambulating, was more a \"pivot\" into chair. She was up with 2 and FWW.      "

## 2021-10-21 NOTE — HOSPITAL COURSE
This is a 81 -year-old female with a past medical history significant PE on Xarelto, for rectal cancer with mets to liver/lung status post liver ablation and chemo per by [facility] oncology presented to the ER on 10/10/1021 with a complaint of intractable nausea, vomiting, abdominal pain, decreased output through colostomy.    CT scan of abdomen pelvis consistent with small bowel obstruction; patient underwent conservative therapy with NG tube placement without improvement; she underwent Exploratory laparotomy lysis of adhesions repair of enterotomy on 10/13/2021.  Diet has been gradually advanced, states is tolerating diet well, her wound is healing well, surgery continue to follow the patient during the stay in the hospital.    She is also being treated for sepsis 2/2 intra-abdominal source with Rocephin+Flagyl for 5 days. She Is also noted to have elevated procal but did complete    His hospital course is complicated with acute renal failure which improved with IV fluid resuscitation; likely secondary to prerenal etiology, creatinine 0.67    Patient noted to be frail, debilitated, unable to participate in PT/OT; she doesn't want to go to skilled nursing facility and wanted to go home.She is no longer a candidate for antineoplastic therapy  as per oncology recs.     Palliative and evaluate the patient; plan is to go home on home hospice

## 2021-10-21 NOTE — WOUND TEAM
[facility]  Inpatient Services   Wound and Skin Care Progress Note    Admission Date: 10/10/2021     Last order of IP CONSULT TO WOUND CARE was found on 10/11/2021 from Hospital Encounter on 10/10/2021     HPI, PMH, SH: Reviewed    Past Surgical History:   Procedure Laterality Date   • PB EXPLORATORY OF ABDOMEN N/A 10/13/2021    Procedure: LAPAROTOMY, EXPLORATORY WITH REPAIR OF ENTEROTOMY;  Surgeon: Camilo Faust M.D.;  Location: SURGERY [facility];  Service: General   • LYSIS ADHESIONS GENERAL N/A 10/13/2021    Procedure: LYSIS, ADHESIONS;  Surgeon: Camilo Faust M.D.;  Location: SURGERY [facility];  Service: General   • HEPATIC ABLATION  5/28/2020    Procedure: ABLATION, LESION, LIVER-TRISEGMENTECTOMY, MICROWAVE ABLATION, INTRA OPERATIVE ULTRA SOUND, SIMPLE RESECTED / REPAIR OF DIAPHRAGM;  Surgeon: Kit West M.D.;  Location: SURGERY [facility];  Service: General   • HEPATIC ABLATION LAPAROSCOPIC  11/15/2018    Procedure: HEPATIC ABLATION LAPAROSCOPIC.- SEGMENT 7/8;  Surgeon: Kit West M.D.;  Location: SURGERY [facility];  Service: General   • COLONOSCOPY WITH BIOPSY  8/23/2015    Procedure: COLONOSCOPY WITH BIOPSY;  Surgeon: Wilbur Glasgow M.D.;  Location: ENDOSCOPY [facility];  Service:    • HEPATIC ABLATION LAPAROSCOPIC  7/6/2015    Procedure: HEPATIC ABLATION LAPAROSCOPIC.;  Surgeon: Kit West M.D.;  Location: SURGERY [facility];  Service:    • CATH PLACEMENT Left 7/6/2015    Procedure: CATH PLACEMENT CEPHALIC POWERPORT;  Surgeon: Kit West M.D.;  Location: SURGERY [facility];  Service:    • FISTULA IN ANO REPAIR  1/28/2015    Performed by Kolton Montgomery M.D. at [facility]   • PERINEAL PROCEDURE  1/28/2015    Performed by Kolton Montgomery M.D. at [facility]   • FISTULA IN ANO REPAIR  10/15/2014    Performed by Kolton Montgomery M.D. at [facility]   • PERINEAL PROCEDURE   10/15/2014    Performed by Kolton Montgomery M.D. at SURGERY [facility]   • IRRIGATION & DEBRIDEMENT GENERAL  2/19/2014    Performed by Kolton Montgomery M.D. at SURGERY [facility]   • FLAP GRAFT  2/19/2014    Performed by Kolton Montgomery M.D. at [facility]   • PERINEAL PROCEDURE  12/18/2013    Performed by Kolton Mnotgomery M.D. at SURGERY [facility]   • MYOCUTANEOUS FLAP  12/18/2013    Performed by Kolton Montgomery M.D. at [facility]   • IRRIGATION & DEBRIDEMENT GENERAL  10/23/2013    Performed by Kolton Montgomery M.D. at [facility]   • FISTULA IN ANO REPAIR  9/4/2013    Performed by Kolton Montgomery M.D. at [facility]   • FLAP ROTATION  9/4/2013    Performed by Kolton Montgomery M.D. at [facility]   • RECOVERY  8/26/2013    Performed by Cath[facility]Recovery Surgery at [facility] SAME DAY [facility]   • BIOPSY GENERAL  7/17/2013    Performed by Kolton Montgomery M.D. at [facility]   • PROCTOSCOPY  7/17/2013    Performed by Kolton Montgomery M.D. at [facility]   • FISTULA IN ANO REPAIR  2/27/2013    Performed by Kolton Montgomery M.D. at [facility]   • SIGMOIDOSCOPY  2/27/2013    Performed by Kolton Montgomery M.D. at [facility]   • LAPAROSCOPY  10/8/2012    Performed by Kolton Montgomery M.D. at [facility]   • ILEO LOOP DIVERSION  10/8/2012    Performed by Kolton Montgomery M.D. at [facility]   • COLECTOMY  9/26/2012    Performed by Kolton Montgomery M.D. at [facility]   • COLONOSCOPY FLEX W/ENDO US  9/20/2012    Performed by Harshil Bolton M.D. at [facility]   • OTHER  2009    left eye torn retina repair   • CATARACT EXTRACTION WITH IOL  2004    bilateral   • ABDOMINAL HYSTERECTOMY TOTAL  1983   • CYST EXCISION  1972    ovarian   • COLON RESECTION     • EYE SURGERY      cataracts   • HYSTERECTOMY LAPAROSCOPY     • PB REMV 2ND CATARACT,CORN-SCLER SECTN     • TONSILLECTOMY       Social History      Tobacco Use   • Smoking status: Never Smoker   • Smokeless tobacco: Never Used   Substance Use Topics   • Alcohol use: No     Chief Complaint   Patient presents with   • Bowel Obstruction     pt transferred from OSH for small bowel obstruction. hx or colorectal cancer and ileostomy     Diagnosis: SBO (small bowel obstruction) ([facility]) [K56.609]    Unit where seen by Wound Team: R321/00     WOUND CONSULT/FOLLOW UP RELATED TO:  Sacrum & Perineum, Est. Ileostomy, abd wound    WOUND HISTORY:  Pt is an older woman transferred from outside facility related to small bowel obstruction. Pt has knonw colorectal cancer with ileostomy for which she is independent with care. Pt has pelvic floor prolapse as well. Wound team was consulted regarding sacrococcygeal area. Per pt she has had a wound there for some time and her and her  are treating it at home with A&D however pt reports her  is forgetful and doesn't apply daily. Pt currently has a purwick and states she knows when she needs to void.     WOUND ASSESSMENT/LDA     Wound 10/13/21 Incision Abdomen (Active)      10/20/21 1745   Site Assessment Red    Periwound Assessment Pink    Margins Undefined edges    Closure Staples    Drainage Amount Small    Drainage Description Serosanguineous    Treatments Cleansed    Wound Cleansing Approved Wound Cleanser    Periwound Protectant Not Applicable    Dressing Cleansing/Solutions Not Applicable    Dressing Options Hydrofiber Silver;Nonadhesive Foam;Hypafix Tape    Dressing Changed Changed    Dressing Status Intact    Dressing Change/Treatment Frequency Every 48 hrs, and As Needed    NEXT Dressing Change/Treatment Date 10/22/21    NEXT Weekly Photo (Inpatient Only) 10/27/21    Non-staged Wound Description Full thickness 10/20/21 1745   Wound Length (cm) 3 cm 10/20/21 1745   Wound Width (cm) 0.1 cm 10/20/21 1745   Wound Surface Area (cm^2) 0.3 cm^2 10/20/21 1745   Shape linear 10/20/21 1745   Wound Odor None 10/20/21 1745    Exposed Structures SILVIO 10/20/21 1745       Wound 10/11/21 Pressure Injury Coccyx POA stage 2 (Active)          10/20/21 1745   Site Assessment Red;Fragile;Yellow    Periwound Assessment Red;Blanchable erythema    Margins Attached edges;Defined edges    Closure Secondary intention    Drainage Amount None    Drainage Description Serosanguineous    Treatments Cleansed    Wound Cleansing Approved Wound Cleanser    Periwound Protectant Not Applicable    Dressing Cleansing/Solutions Not Applicable    Dressing Options Hydrocolloid Thin;Mepilex    Dressing Changed Observed    Dressing Status Intact    Dressing Change/Treatment Frequency Every 72 hrs, and As Needed    NEXT Dressing Change/Treatment Date 10/23/21    NEXT Weekly Photo (Inpatient Only) 10/27/21    Pressure Injury Stage 2 10/20/21 1745   Wound Length (cm) 6 cm 10/20/21 1745   Wound Width (cm) 5.5 cm 10/20/21 1745   Wound Depth (cm) 0.1 cm 10/12/21 1700   Wound Surface Area (cm^2) 33 cm^2 10/20/21 1745   Wound Volume (cm^3) 0.1 cm^3 10/12/21 1700   Shape irregular    Wound Odor None    Exposed Structures None        Wound 10/20/21 Pressure Injury Coccyx Distal Right DTI (Active)      10/20/21 1745   Site Assessment Red;Purple    Periwound Assessment Pink    Margins Defined edges    Closure Open to air    Drainage Amount None    Treatments Cleansed    Wound Cleansing Approved Wound Cleanser    Periwound Protectant Not Applicable    Dressing Cleansing/Solutions Not Applicable    Dressing Options Mepilex    Dressing Changed Observed    Dressing Status Intact    Dressing Change/Treatment Frequency Every 72 hrs, and As Needed    NEXT Dressing Change/Treatment Date 10/23/21    NEXT Weekly Photo (Inpatient Only) 10/27/21    Pressure Injury Stage DTPI 10/20/21 1745   Wound Length (cm) 2.5 cm 10/20/21 1745   Wound Width (cm) 1.5 cm 10/20/21 1745   Wound Surface Area (cm^2) 3.75 cm^2 10/20/21 1745   Shape irregular    Wound Odor None    Exposed Structures None        "    Vascular:    SHELBY:   No results found.    Lab Values:    Lab Results   Component Value Date/Time    WBC 9.3 10/18/2021 03:05 AM    RBC 3.56 (L) 10/18/2021 03:05 AM    HEMOGLOBIN 9.6 (L) 10/18/2021 03:05 AM    HEMATOCRIT 32.5 (L) 10/18/2021 03:05 AM    HBA1C 5.5 05/05/2020 07:20 AM      Culture Results show:  No results found for this or any previous visit (from the past 720 hour(s)).    Pain Level/Medicated:  Denies pain       INTERVENTIONS BY WOUND TEAM:  Chart and images reviewed. Discussed with bedside RN. All areas of concern (based on picture review, LDA review and discussion with bedside RN) have been thoroughly assessed. Documentation of areas based on significant findings. This RN in to assess patient. Performed standard wound care which includes appropriate positioning, dressing removal and non-selective debridement. Pictures and measurements obtained weekly if/when required.  Preparation for Dressing removal: NA open to air  Non-selectively Debrided with:  NS and gauze.  Sharp debridement: NA  Raysa wound: Cleansed with NS and gauze, Prepped with no sting  Primary Dressing: hydrocolloid thin  Secondary (Outer) Dressing: Sacral mepilex    Pt has pelvic floor prolapse causing bulging area to the perineum. No advanced wound care indicated.    Pt declined to have ostomy appliance changed. Per pt she uses a 40mm round convex ring,  cymed washer (her chemo dissolves normal paste rings), 2 3/4\" Convex 2 piece barrier and pouch. Pt is independent with changes and has supplies from home. Supplies for here left at bedside. All supplies left at bedside with exception of cymed washer as we do not have those readily available.     Interdisciplinary consultation: Patient, Bedside RN Asha     EVALUATION / RATIONALE FOR TREATMENT:  Most Recent Date:  10/20: Another consult received for follow up with leaking ostomy to protect midline incision. In to see pt and RN had changed the ostomy appliance earlier. Effluent is " green and distal corner of appliance is saturated with serosanguinous drainage. Peeled back and then trimmed tape border revealing output from midline incision.  Cleaned wound with NS and dried. Aquacel Ag Hydrofiber applied to manage bioburden, absorb exudate, and maintain a moist wound environment without laterally wicking exudate therefore reducing bruce-wound maceration. Placed piece of non-adhesive foam secured with hypafix tape for drainage. Ostomy belt placed to help secure appliance and support of pouch heavy and belt usually recommended with convex appliance. Padded buckle with gauze to protect skin.    Pt's POA Stage 2 to L sacrococcygeal area has darkened in color and now there is a DTI to R distal coccyx. Thin hydrocolloid to continue to L side wounds and both sides covered with sacral mepilex.     10/12/22: Pt with known POA pressure injury to the sacrococcygeal area. Hydrocolloid thin applied to cleanse and autolytically debride. mepilex to offload pressure.     Goals: Steady decrease in wound area and depth weekly.    WOUND TEAM PLAN OF CARE ([X] for frequency of wound follow up,):   Nursing to follow orders written for wound care. Contact wound team if area fails to progress, deteriorates or with any questions/concerns  Dressing changes by wound team:                   Follow up 3 times weekly:                NPWT change 3 times weekly:     Follow up 1-2 times weekly:      Follow up Bi-Monthly:                   Follow up as needed:   X  Other (explain):     NURSING PLAN OF CARE ORDERS (X):  Dressing changes: See Dressing Care orders: X  Skin care: See Skin Care orders:   RN Prevention Protocol:   Rectal tube care: See Rectal Tube Care orders:   Other orders:    RSKIN:   CURRENTLY IN PLACE (X), APPLIED THIS VISIT (A), ORDERED (O):   Q shift Virgilio:  X  Q shift pressure point assessments:  X    Surface/Positioning   Pressure redistribution mattress        X    Low Airloss          Bariatric foam       Bariatric TA     Waffle cushion        Waffle Overlay          Reposition q 2 hours      TAPs Turning system     Z Rush Pillow     Offloading/Redistribution   Sacral Mepilex (Silicone dressing)   x  Heel Mepilex (Silicone dressing)   x      Heel float boots (Prevalon boot)             Float Heels off Bed with Pillows           Respiratory   Silicone O2 tubing       X  Gray Foam Ear protectors   X  Cannula fixation Device (Tender )          High flow offloading Clip    Elastic head band offloading device      Anchorfast                                                         Trach with Optifoam split foam             Containment/Moisture Prevention     Rectal tube or BMS    Purwick/Condom Cath   x     Lynne Catheter    Barrier wipes           Barrier paste     Antifungal tx      Interdry        Mobilization   Not assessed    Up to chair        Ambulate      PT/OT      Nutrition       Dietician        Diabetes Education      PO x    TF     TPN     NPO   # days     Other        Anticipated discharge plans:   LTACH:        SNF/Rehab:                  Home Health Care:    X, pt has pressure injury and may need follow up with ileostomy       Outpatient Wound Center:            Self/Family Care:        Other:                  Vac Discharge Needs:   Not Applicable Pt not on a wound vac:     X  Regular Vac while inpatient, alternative dressing at DC:        Regular Vac in use and continued at DC:            Reg. Vac w/ Skin Sub/Biologic in use. Will need to be changed 2x wkly:      Veraflo Vac while inpatient, ok to transition to Regular Vac on Discharge:           Veraflo Vac while inpatient, will need to remain on Veraflo Vac upon discharge:

## 2021-10-21 NOTE — PROGRESS NOTES
Acute care surgery Silver progress Note               Author: Jannet Baxter M.D. Date & Time created: 10/21/2021  10:18 AM     Interval History:  Feeling better today.  Is not able to move her left leg and so is having trouble walking.  Planning to discharge home on hospice.    Review of Systems:  Review of Systems   Constitutional: Negative for chills and fever.   Gastrointestinal: Positive for abdominal pain. Negative for nausea and vomiting.       Physical Exam:  Physical Exam  Constitutional:       General: She is not in acute distress.     Appearance: Normal appearance. She is ill-appearing.   HENT:      Right Ear: External ear normal.      Left Ear: External ear normal.   Eyes:      Extraocular Movements: Extraocular movements intact.   Cardiovascular:      Pulses: Normal pulses.   Pulmonary:      Effort: Pulmonary effort is normal.   Abdominal:      General: Abdomen is flat. There is no distension.      Tenderness: There is abdominal tenderness.      Comments: Minimal appropriate incisional tenderness.  No leakage from the ostomy appliance this morning after being changed last night.   Skin:     General: Skin is warm and dry.      Capillary Refill: Capillary refill takes less than 2 seconds.   Neurological:      General: No focal deficit present.      Mental Status: She is alert and oriented to person, place, and time.   Psychiatric:         Mood and Affect: Mood normal.         Behavior: Behavior normal.         Labs:          No results for input(s): SODIUM, POTASSIUM, CHLORIDE, CO2, BUN, CREATININE, MAGNESIUM, PHOSPHORUS, CALCIUM in the last 72 hours.  No results for input(s): ALTSGPT, ASTSGOT, ALKPHOSPHAT, TBILIRUBIN, DBILIRUBIN, GAMMAGT, AMYLASE, LIPASE, ALB, PREALBUMIN, GLUCOSE in the last 72 hours.  No results for input(s): RBC, HEMOGLOBIN, HEMATOCRIT, PLATELETCT, PROTHROMBTM, APTT, INR, IRON, FERRITIN, TOTIRONBC in the last 72 hours.      Hemodynamics:  Temp (24hrs), Av.4 °C (97.6 °F),  Min:36.2 °C (97.1 °F), Max:36.8 °C (98.2 °F)  Temperature: 36.2 °C (97.1 °F)  Pulse  Av.4  Min: 72  Max: 127   Blood Pressure : 125/63     Respiratory:    Respiration: 20, Pulse Oximetry: 94 %     Given By:: Mouthpiece, Work Of Breathing / Effort: Mild  RUL Breath Sounds: Coarse Crackles, RML Breath Sounds: Coarse Crackles, RLL Breath Sounds: Coarse Crackles, EARNEST Breath Sounds: Coarse Crackles, LLL Breath Sounds: Coarse Crackles  Fluids:    Intake/Output Summary (Last 24 hours) at 10/21/2021 1018  Last data filed at 10/21/2021 0742  Gross per 24 hour   Intake --   Output 625 ml   Net -625 ml        GI/Nutrition:  Orders Placed This Encounter   Procedures   • Diet Order Diet: Low Fiber(GI Soft)     Standing Status:   Standing     Number of Occurrences:   1     Order Specific Question:   Diet:     Answer:   Low Fiber(GI Soft) [2]     Medical Decision Making, by Problem:  Active Hospital Problems    Diagnosis    • *Small bowel obstruction (HCC) [K56.609]    • History of pulmonary embolus (PE) [Z86.711]    • 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  Okay to discharge at any point from the surgical standpoint.  Follow-up in 1 week with Dr. Faust for staple removal.  We will sign off, please call with questions or concerns.      D/C instructions:    1. DIET: Upon discharge from the hospital you may resume your normal preoperative diet. Depending on how you are feeling and whether you have nausea or not, you may wish to stay with a bland diet for the first  few days. However, you can advance this as quickly as you feel ready.    2. ACTIVITIES: After discharge from the hospital, you may resume full routine activities. However, there should be no heavy lifting (greater than 15 pounds) and no strenuous activities until after your follow-up visit. Otherwise, routine activities of daily living are acceptable.    3. DRIVING: You may drive whenever you are off pain medications and are able to perform the activities needed to drive, i.e. turning, bending, twisting, etc.    4. BATHING: You may get the wound wet at any time after leaving the hospital. You may shower, but do not submerge in a bath for at least a week. Dressings may come off after 48 hours, but will peel off by themselves in the next 7-10 days.    5. BOWEL FUNCTION: Constipation is common after an operation, especially with pain medications. The combination of pain medication and decreased activity level can cause constipation in otherwise normal patients. If you feel this is occurring, take a laxative (Milk of Magnesia, Ex-Lax, Senokot, etc.) until the problem has resolved.    6. PAIN MEDICATION: You will be given a prescription for pain medication at discharge. Please take these as directed. It is important to remember not to take medications on an empty stomach as this may cause nausea.    7.CALL IF YOU HAVE: (1) Fevers to more than 101F, (2) Unusual chest or leg pain, (3) Drainage or fluid from incision that may be foul smelling, increased tenderness or soreness at the wound or the wound edges are no longer together, redness or swelling at the incision site. Please do not hesitate to call with any other questions.     8. APPOINTMENT: Contact our office at [phone] for a follow-up appointment in 1 week following your procedure.    If you have any additional questions, please do not hesitate to call the office and speak to either myself or the physician on call.    Office address:  [address]  [place]    Jannet Soria MD  [facility]  [phone]          Quality-Core Measures

## 2021-10-21 NOTE — DISCHARGE PLANNING
Anticipated Discharge Disposition: home with guiding [facility] hospice     Action: spoke with pt's daughter Diana. She reports that the pt's , family, friends and neighbors will rotate to have someone be with her almost all the time.     Daughter Diana reports that therapy services recommend her for to have a transport chair. Notified Yadira with [facility], she confirms she will have it delivered to pt.     Yadira asked for a week of pain medications to be sent with pt until [facility] hospice can transition her over to their service. Notified MD Larios.     Issued IMM to patient at  4:17 pm on 10/21/21. Patient does not plan to appeal. Provided patient with copy.     Pt's daughter inquired about POA for financial and healthcare. MD Larios provided signatures on certificate of competencies for both healthcare POA and financial POA. Met with pt and her  at bedside to explain what POA is and the benefit of setting up POA. Guiding [facility] hospice may have a Notary that can see her. Provided her with POA packets if she is interested in pursuing POA.     Barriers to Discharge: transport home tomorrow.     Plan: LSW to assist as needed and monitor for barriers to discharge.

## 2021-10-21 NOTE — ASSESSMENT & PLAN NOTE
Drainage of what appears to be stool from superior margin of laparotomy incision  Surgery following, stated that wound is looking better

## 2021-10-21 NOTE — PROGRESS NOTES
Hospital Medicine Daily Progress Note    Date of Service  10/21/2021    Chief Complaint  Karlene Walters is a 81 y.o. female admitted 10/10/2021 with small bowel obstruction, nausea, vomiting    Hospital Course  This is a 81 -year-old female with a past medical history significant PE on Xarelto, for rectal cancer with mets to liver/lung status post [facility] to the ER on 10/10/1021 with a complaint of intractable nausea, vomiting, abdominal pain, decreased output through colostomy.    CT scan of abdomen pelvis consistent with small bowel obstruction; patient underwent conservative therapy with NG tube placement without improvement; she underwent Exploratory laparotomy lysis of adhesions repair of enterotomy on 10/13/2021.  Diet has been gradually advanced, states is tolerating diet well, her wound is healing well, surgery continue to follow the patient during the stay in the hospital.    She is also being treated for sepsis 2/2 intra-abdominal source with Rocephin+Flagyl for 5 days. She Is also noted to have elevated procal but did complete    His hospital course is complicated with acute renal failure which improved with IV fluid resuscitation; likely secondary to prerenal etiology, creatinine 0.67    Patient noted to be frail, debilitated, unable to participate in PT/OT; CT scan wanted to go to skilled nursing.    She is no longer a candidate for antineoplastic therapy and recommended hospice      Ms. Karlene Walters is a 81 y.o. female with history of rectal carcinoma with metastasis to liver and lung status post liver ablation and chemo followed by [facility] oncology who presented on 10/10/2021 with nausea, vomiting, abdominal pain, decreased output through colostomy.  He did improve his showed small bowel obstruction.  General surgery was consulted with NG tube placement and bowel rest.  Status post lysis of adhesions and repair of enterotomy on 10/13.  NG tube  "was discontinued on 10/16 and diet was advanced.      Interval Problem Update  YULI ON  She continues to eat and have ileostomy output.  She continues to have abdominal pain.  Her RN noticed leakage from the superior margin of the incision, concerning for ostomy leak.  Her breathing is still uncomfortable even with nebulizer. She has productive cough.  She has been unable to walk because her legs are too weak. She was able to briefly stand at the side of the bed to get a standing weight.  She is depressed at being alone in the hospital and nervous about going home.  She denies bleeding, F/C, bladder dysfunction.    I met with her, her , her daughter, her MAXWELL, and primary RN Ashlie. We reviewed her preference to return home and follow up with her oncologist for further treatment. We discussed the logistics of getting into an  or home with home health. She has been unable to stand or walk with family and RN assistance. She has become too debilitated and is no longer a candidate for further treatment, which was confirmed with Dr. Steele and Dr. Bello. I shared her guarded prospects for meaningful rehabilitation nor functional improvement. I advised that she is approaching the end of her life. If her goal is to return home rather than live in a SNF, she requires hospice to provide the caregiver support needed for her EOL care at home. Family is supportive of her enrolling in hospice and they are willing to assist her and her  for the care she requires. We reviewed that there may only be \"hospice-like\" care in [place], for which we will provide all DME and enroll in  for discharge and coordinate her outpatient medications with her PCP. She is agreeable to a hospice consultation to learn more about enrollment and hospice-like care. I spent 65 minutes in  advanced care planning.    I have personally seen and examined the patient at bedside. I discussed the plan of care with patient, family, bedside RN, " charge RN,  and pharmacy.    POC discussed with surgeon Dr. Baxter. Ostomy site is leaking around the incision. She advised wound care re-evaluation.    POC discussed with oncologists Dr. Steele and Dr. Bello. They agreed with hospice consultation and determined that she is no longer a candidate for antineoplastic therapy.      10/21:  --No acute events overnight, laying in bed, denies any complaint.  Patient is evaluated palliative, plan is to discharge the patient to hospice.   --Patient is noted to have hypophosphatemia will provide Neutra-Phos.  Will provide IV magnesium.  Plan of care has been discussed with the patient, nursing supervisor, .    Consultants/Specialty  general surgery and oncology    Code Status  Full Code    Disposition  Patient is medically cleared.   Anticipate discharge to to hospice.  I have placed the appropriate orders for post-discharge needs.    Review of Systems  Review of Systems   Constitutional: Positive for weight loss.   HENT: Negative for sore throat.    Eyes: Negative for pain.   Respiratory: Positive for cough, sputum production and shortness of breath. Negative for hemoptysis.    Cardiovascular: Negative for chest pain and leg swelling.   Gastrointestinal: Positive for abdominal pain. Negative for diarrhea, melena, nausea and vomiting.   Genitourinary: Negative for dysuria and hematuria.   Musculoskeletal: Negative for back pain, joint pain and myalgias.   Skin: Negative for rash.   Neurological: Positive for weakness. Negative for dizziness.   Psychiatric/Behavioral: Positive for depression. The patient is nervous/anxious.         Physical Exam  Temp:  [36.2 °C (97.1 °F)-36.8 °C (98.2 °F)] 36.2 °C (97.1 °F)  Pulse:  [] 97  Resp:  [16-20] 20  BP: (114-125)/(63-77) 125/63  SpO2:  [94 %-98 %] 94 %    Physical Exam  Vitals and nursing note reviewed. Chaperone present: , daughter, and MAXWELL at bedside.   Constitutional:       Appearance:  Normal appearance. She is ill-appearing.      Interventions: Nasal cannula in place.   HENT:      Head:      Comments: Bitemporal wasting  Cardiovascular:      Rate and Rhythm: Normal rate.      Pulses: Normal pulses.   Pulmonary:      Effort: Pulmonary effort is normal.      Breath sounds: Normal breath sounds. No rhonchi.   Abdominal:      General: Bowel sounds are decreased. There is distension.      Palpations: There is no mass.      Tenderness: There is abdominal tenderness.      Comments: Laparotomy incision stapled, minimally tender, nonerythematous.   Genitourinary:     Comments: +Ileostomy with yellow liquid output and gas  Musculoskeletal:      Cervical back: Neck supple.      Right lower leg: No edema.      Left lower leg: No edema.   Skin:     General: Skin is warm and dry.   Neurological:      Mental Status: She is alert.      Comments: Appropriately conversant   Psychiatric:         Attention and Perception: Attention and perception normal.         Mood and Affect: Affect normal. Mood is anxious and depressed.         Speech: Speech normal.         Behavior: Behavior normal. Behavior is cooperative.         Cognition and Memory: Cognition and memory normal.         Judgment: Judgment normal.         Fluids    Intake/Output Summary (Last 24 hours) at 10/21/2021 1506  Last data filed at 10/21/2021 1406  Gross per 24 hour   Intake 240 ml   Output 950 ml   Net -710 ml       Laboratory                        Imaging  DX-ABDOMEN FOR TUBE PLACEMENT   Final Result      Enteric tube tip projects over the stomach      CT-ABDOMEN-PELVIS WITH   Final Result      1.  Small bowel obstruction, with transition point is in the pelvis, 20-30 cm proximal to the ileostomy. This is most likely due to adhesions and less likely due to internal hernia.   2.  Posttreatment changes in the right hepatic dome, with a 10.1 cm multiloculated fluid collection at the site of prior ablation. Infected collection is unlikely in the  absence of internal pockets of air, but can be considered in the appropriate    clinical settings.   3.  No definitive evidence of abdominopelvic metastatic disease. Evaluation for liver metastases is difficult on this single phase study.   4.  Bronchiectasis/scarring in the lower lobes, right worse than left.      DX-CHEST-PORTABLE (1 VIEW)   Final Result      Right basilar atelectasis. No focal consolidation. No effusions.           Assessment/Plan  * Small bowel obstruction (HCC)- (present on admission)  Assessment & Plan  Resolved - tolerating PO with output from ileostomy  CT A/P demonstrated SBO  General Surgery  Following and will follow their recs    Malignant neoplasm of rectum (HCC)- (present on admission)  Assessment & Plan  Follows with Dr. Bello  Inconsistent functional ability for treatment plan  Oncology has met with patient this admission. She is no longer a candidate for antineoplastic therapy and recommended hospice.  Palliative evaluated, plan is to discharge the patient on home hospice.  CODE STATUS changed to DNI/DN AR    Goals of care, counseling/discussion  Assessment & Plan  Patient is being followed by palliative/hospice.  Plan is to discharge home on home hospice after an extensive discussion    Problem involving surgical incision  Assessment & Plan  Drainage of what appears to be stool from superior margin of laparotomy incision  Surgery following, stated that wound is looking better    Metastatic colorectal cancer (HCC)- (present on admission)  Assessment & Plan  See plan for malignant neoplasm of rectum    Metabolic acidosis, increased anion gap (IAG)- (present on admission)  Assessment & Plan  Resolved with IVF  Likely lactic acidosis from SBO    Sepsis (HCC)- (present on admission)  Assessment & Plan  Resolved    This is Sepsis Present on admission  SIRS criteria identified on my evaluation include: Tachycardia, with heart rate greater than 90 BPM, Tachypnea, with respirations greater  than 20 per minute and Leukocytosis, with WBC greater than 12,000  Source is intra-abdominal.  On exploratory laparotomy showed perforation  Sepsis protocol initiated  Fluid resuscitation ordered per protocol  IV antibiotics as appropriate for source of sepsis  While organ dysfunction may be noted elsewhere in this problem list or in the chart, degree of organ dysfunction does not meet CMS criteria for severe sepsis.      Procalcitonin ordered and minimally elevated 0.55.    Blood cultures x2, sputum culture for cough with sputum production.    Flagyl and ceftriaxone as IV antibiotic for 5-day course    Cough with sputum- (present on admission)  Assessment & Plan  CXR revealed basilar atelectasis  Procalcitonin elevated, completed 5 days of ceftriaxone which would cover CAP  PRN antitussives    Hyponatremia- (present on admission)  Assessment & Plan  Resolved with 1/2 NS  Discontinued IVF as she is tolerating PO    BRIONNA (acute kidney injury) (HCC)- (present on admission)  Assessment & Plan  Likely prerenal in the etiology, monitor, avoid nephrotoxin    History of pulmonary embolus (PE)- (present on admission)  Assessment & Plan  Restart xarelto    Secondary malignant neoplasm of liver (HCC)- (present on admission)  Assessment & Plan  See plan for malignant neoplasm of rectum       VTE prophylaxis: therapeutic anticoagulation with xarelto

## 2021-10-21 NOTE — CARE PLAN
The patient is Watcher - Medium risk of patient condition declining or worsening    Shift Goals  Clinical Goals: ambulation, safety  Patient Goals: ambulation  Family Goals: No family present    Progress made toward(s) clinical / shift goals:    Problem: Skin Integrity  Goal: Skin integrity is maintained or improved  Note: Ostomy appliance remained intact throughout shift. Scant discharge noted from surgical site.   Purewick keeping pt dry, patient repositioned throughout shift.      Problem: Mobility  Goal: Patient's capacity to carry out activities will improve  Outcome: Progressing  Flowsheets (Taken 10/21/2021 5805)  Mobility:   Encouraged mobilization per interdisciplinary team recommendations   Provided assistive devices   Collaborated with PT/OT  Note: Pt up to chair again today and worked with PT. Pt did tolerate moving from bed to chair better than yesterday AEB she actually took a few steps. She does have dyspnea with exertion. Oxygen worn throughout shift.        Patient is not progressing towards the following goals:

## 2021-10-21 NOTE — CONSULTS
"Reason for Palliative Care Consult: Advance Care Planning    Consulted by: Dr Donahue    HPI:      Ms. Karlene Walters is a 81 y.o. female with history of rectal carcinoma with metastasis to liver and lung status post liver ablation and chemo. She presented 10/10 with SBO, had NGT placed and bowel rest, then went to OR for CHUCK and repair of enterotomy on 10/13.  NG tube was discontinued on 10/16 and diet was advanced. After a discussion with Dr Donahue yesterday, she has chosen to discharge on hospice.   Per Dr Donahue: \"I met with her, her , her daughter, her MAXWELL, and primary RN Ashlie. We reviewed her preference to return home and follow up with her oncologist for further treatment. We discussed the logistics of getting into an  or home with home health. She has been unable to stand or walk with family and RN assistance. She has become too debilitated and is no longer a candidate for further treatment, which was confirmed with Dr. Steele and Dr. Bello. I shared her guarded prospects for meaningful rehabilitation nor functional improvement. I advised that she is approaching the end of her life. If her goal is to return home rather than live in a SNF, she requires hospice to provide the caregiver support needed for her EOL care at home. Family is supportive of her enrolling in hospice and they are willing to assist her and her  for the care she requires. We reviewed that there may only be \"hospice-like\" care in [place], for which we will provide all DME and enroll in  for discharge and coordinate her outpatient medications with her PCP. She is agreeable to a hospice consultation to learn more about enrollment and hospice-like care.\"    Past medical/surgical history:  Past Medical History:   Diagnosis Date   • Restless legs 5/6/2020   • Elevated alkaline phosphatase level 11/15/2017   • Pneumonia 05/2017    tx'd with antibx   • Chemotherapy-induced neutropenia (HCC) 9/28/2016   • Routine general " medical examination at a health care facility 3/14/2016    3/14/16   • Renal insufficiency 3/14/2016   • Lightheadedness 9/17/2015   • Blood clotting disorder (HCC) 08/2015    bilat PE    • Cancer (HCC) 09/2012    rectal cancer,  Liver CA-2015   • Blood transfusion 1957   • Adverse effect of anesthesia     elevated BP postop   • Anesthesia    • Arrhythmia     occasional   • Breath shortness     pt reports d/t chemo treatment; no O2; with moderate exertion; no c/o at this time   • CATARACT     removed b/l   • Chickenpox     history of   • Colon cancer (HCC)    • Dental disorder     dentures UPPERS AND LOWERS   • Emphysema of lung (HCC)     COPD   • Fall    • Greek measles     history of   • Heart murmur     as a child   • Hemorrhagic disorder (HCC)     on Xarelto    • High cholesterol    • Hypercholesteremia    • Hypertension     no meds currently    • Ileostomy in place (HCC)    • Ileostomy in place (HCC)    • Indigestion    • Influenza     history of   • Mumps     history of   • Obstruction     ileostomy   • Other specified symptom associated with female genital organs     HYSTERECTOMY 1993   • Pulmonary embolism (HCC)    • 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)    • Seasonal allergies    • Swelling of both ankles     Lasix PRN   • Tonsillitis          Additional consults:   Oncology    Assessment:  Neuro: Awake, alert, pleasant  Dyspnea:  none  Last BM: 10/21/21-    Pain:  controlled  Dementia:  none      Living situation & psychosocial:  Lives with family in [facility]     Spiritual: Gnosticism- hoping to go to Taoism.     Palliative Performance Scale: 40 %    Healthcare Directive Information:  Advance Directive:  - none- information given   DPOA:  Daughter Diana Gardner   POLST:  Filled out with family  Family present during completion: Daughter Diana, son in law Pat,  Ray    Code Status:  DNR/DNI      Outcome:  Met with Mrs. Walters and all her family at the bedside. Introduced myself and explained the  role of palliative care.     Her family tells me Mrs. Walters has been in treatment for her cancer off an on for 10 years. She seemed accepting of the information given to her by Dr Donahue, that the oncologists felt there were no more treatments that would provide a benefit and recommended hospice.  The family is from [place] and she will have plenty of support. They are installing a ramp, and think they can get her into the house and wheelchair can be used in the house. She is having difficulty walking with walker but can still assist with transfer.     At the time I visited with her, she was listed in the chart as full code. We held a code discussion and she agreed with a comfort-based approach, DNR/DNI, and wanted to be allowed a natural death.  We filled out a POLST with her daughter listed as healthcare surrogate, and I encouraged them to fill out an AD as well and gave paperwork.     Provided therapeutic communication including empathetic listening, validation, normalization throughout encounter. Provided palliative care contact information and encouraged Mrs. Walters and family to call with any questions or needs.     Plan: POLST filed and original is above bed. Patient will discharge on hospice with family transporting. Updated Dr. Larios    Recommendations: I recommend a hospice consult. It has been placed    Updated: Assisting daughter with RV parking while they await discharge, placed spiritual care consult    Thank you for allowing Palliative Care to participate in this patient's care. Please call our team with questions and/or additional needs.    Total visit time was 60 minutes discussing advance care planning.     Belen Leach M.D.

## 2021-10-21 NOTE — THERAPY
Physical Therapy   Daily Treatment     Patient Name: Karlene Walters  Age:  81 y.o., Sex:  female  Medical Record #: [id number]  Today's Date: 10/21/2021     Precautions  Precautions: (P) Fall Risk  Comments: (P) Ostomy    Assessment    Pt found seated in recliner chair, wanting to get BTB. Pt's family BS and had discussion regarding PT's role when pt is planning on d/c home w/hospice. Family does not need pt to be ambulatory to/from the BR or around the home. The plan for hospice is to make mom comfortable in her own environment. Pt requiring mod assist w/sit->stands and bed mobility. Pt did take steps for the transfer BTB w/FWW. Pt tachycardic w/her effort and SOB. PT will see pt 1 more time for any further d/c ?'s or concerns.     Plan    Continue current treatment plan.    DC Equipment Recommendations: wheelchair       Objective       10/21/21 1417   Other Treatments   Other Treatments Provided Met w/pt and family regarding PT needs or goals. Family is planning on taking pt home on hospice. Pt just wants to go home and family has been on the hospice journey for 10 yrs.    Balance   Sitting Balance (Static) Fair +   Sitting Balance (Dynamic) Fair   Standing Balance (Static) Fair -   Standing Balance (Dynamic) Poor +   Weight Shift Sitting Fair   Weight Shift Standing Poor   Comments standing w/FWW   Gait Analysis   Gait Level Of Assist Minimal Assist   Assistive Device Front Wheel Walker   Distance (Feet) 2   Skilled Intervention Verbal Cuing   Comments Pt c/o Lft LE weakness and the inability to move it w/amb. Pt demonstrated her transfer taking steps from chair->bed. Pt SOB w/HR in 130's.   Bed Mobility    Supine to Sit   (pt found seated in the chair)   Sit to Supine Moderate Assist  (HOB flat and no rails)   Scooting Minimal Assist  (seated)   Functional Mobility   Sit to Stand Moderate Assist  (from recliner chair->FWW)   Bed, Chair, Wheelchair Transfer Minimal Assist  (w/FWW from recliner chair->bed)    Transfer Method   (pt walked 2' for the transfer)   Skilled Intervention Verbal Cuing;Postural Facilitation;Compensatory Strategies   How much difficulty does the patient currently have...   Turning over in bed (including adjusting bedclothes, sheets and blankets)? 1   Sitting down on and standing up from a chair with arms (e.g., wheelchair, bedside commode, etc.) 1   Moving from lying on back to sitting on the side of the bed? 1   How much help from another person does the patient currently need...   Moving to and from a bed to a chair (including a wheelchair)? 3   Need to walk in a hospital room? 2   Climbing 3-5 steps with a railing? 1   6 clicks Mobility Score 9   Short Term Goals    Short Term Goal # 1 Pt will amb >15ft with FWW and min A within 6 visits to progress toward limited household distances for DC.   Goal Outcome # 1 goal not met   Short Term Goal # 2 Pt will perform supine<>sit with HOB flat and min A via log roll within 6 visits to improve ind with bed mob.   Goal Outcome # 2 Goal not met   Short Term Goal # 3 Pt will perform all fxnl transfers with FWW and min A within 6 visits to increase OOB activity.   Goal Outcome # 3 Progressing as expected   Short Term Goal # 4 Pt will perform supine<>sit with HOB flat and SPV within 6 visits to improve ind with bed mob.   Goal Outcome # 4 Goal not met

## 2021-10-21 NOTE — DISCHARGE PLANNING
1030- Referral sent to [facility], Attn- Yadira    7929- Spoke To: Asha  Agency/Facility Name: [facility]  Plan or Request: JEREMIAS Blum called to f/u and to make sure referral was received. Per Yadira Barry is in meeting and will give you a call back.

## 2021-10-22 ENCOUNTER — PHARMACY VISIT (OUTPATIENT)
Dept: PHARMACY | Facility: MEDICAL CENTER | Age: 81
End: 2021-10-22
Payer: COMMERCIAL

## 2021-10-22 VITALS
BODY MASS INDEX: 22.33 KG/M2 | TEMPERATURE: 97.7 F | RESPIRATION RATE: 18 BRPM | HEART RATE: 108 BPM | SYSTOLIC BLOOD PRESSURE: 126 MMHG | WEIGHT: 134 LBS | OXYGEN SATURATION: 97 % | HEIGHT: 65 IN | DIASTOLIC BLOOD PRESSURE: 57 MMHG

## 2021-10-22 PROBLEM — T81.89XA PROBLEM INVOLVING SURGICAL INCISION: Status: RESOLVED | Noted: 2021-10-20 | Resolved: 2021-10-22

## 2021-10-22 PROBLEM — A41.9 SEPSIS (HCC): Status: RESOLVED | Noted: 2021-10-11 | Resolved: 2021-10-22

## 2021-10-22 PROBLEM — E87.29 METABOLIC ACIDOSIS, INCREASED ANION GAP (IAG): Status: RESOLVED | Noted: 2021-10-11 | Resolved: 2021-10-22

## 2021-10-22 PROBLEM — K56.609 SMALL BOWEL OBSTRUCTION (HCC): Status: RESOLVED | Noted: 2021-10-11 | Resolved: 2021-10-22

## 2021-10-22 PROBLEM — E87.1 HYPONATREMIA: Status: RESOLVED | Noted: 2021-07-19 | Resolved: 2021-10-22

## 2021-10-22 PROCEDURE — 97535 SELF CARE MNGMENT TRAINING: CPT | Mod: CQ

## 2021-10-22 PROCEDURE — 700111 HCHG RX REV CODE 636 W/ 250 OVERRIDE (IP): Performed by: HOSPITALIST

## 2021-10-22 PROCEDURE — 700102 HCHG RX REV CODE 250 W/ 637 OVERRIDE(OP): Performed by: HOSPITALIST

## 2021-10-22 PROCEDURE — A9270 NON-COVERED ITEM OR SERVICE: HCPCS | Performed by: HOSPITALIST

## 2021-10-22 PROCEDURE — 700102 HCHG RX REV CODE 250 W/ 637 OVERRIDE(OP): Performed by: STUDENT IN AN ORGANIZED HEALTH CARE EDUCATION/TRAINING PROGRAM

## 2021-10-22 PROCEDURE — 94640 AIRWAY INHALATION TREATMENT: CPT

## 2021-10-22 PROCEDURE — A9270 NON-COVERED ITEM OR SERVICE: HCPCS | Performed by: STUDENT IN AN ORGANIZED HEALTH CARE EDUCATION/TRAINING PROGRAM

## 2021-10-22 PROCEDURE — 94760 N-INVAS EAR/PLS OXIMETRY 1: CPT

## 2021-10-22 PROCEDURE — 700101 HCHG RX REV CODE 250: Performed by: INTERNAL MEDICINE

## 2021-10-22 PROCEDURE — 99239 HOSP IP/OBS DSCHRG MGMT >30: CPT | Performed by: HOSPITALIST

## 2021-10-22 RX ORDER — HEPARIN SODIUM (PORCINE) LOCK FLUSH IV SOLN 100 UNIT/ML 100 UNIT/ML
300-500 SOLUTION INTRAVENOUS PRN
Status: DISCONTINUED | OUTPATIENT
Start: 2021-10-22 | End: 2021-10-22 | Stop reason: HOSPADM

## 2021-10-22 RX ADMIN — OXYCODONE 5 MG: 5 TABLET ORAL at 04:16

## 2021-10-22 RX ADMIN — OXYCODONE 5 MG: 5 TABLET ORAL at 14:08

## 2021-10-22 RX ADMIN — DIBASIC SODIUM PHOSPHATE, MONOBASIC POTASSIUM PHOSPHATE AND MONOBASIC SODIUM PHOSPHATE 500 MG: 852; 155; 130 TABLET ORAL at 11:43

## 2021-10-22 RX ADMIN — SODIUM CHLORIDE 4 ML: 7 NEBU SOLN,3 % NEBU at 09:33

## 2021-10-22 RX ADMIN — HEPARIN 500 UNITS: 100 SYRINGE at 15:52

## 2021-10-22 RX ADMIN — ALBUTEROL SULFATE 2.5 MG: 2.5 SOLUTION RESPIRATORY (INHALATION) at 09:33

## 2021-10-22 ASSESSMENT — COGNITIVE AND FUNCTIONAL STATUS - GENERAL
MOVING TO AND FROM BED TO CHAIR: UNABLE
STANDING UP FROM CHAIR USING ARMS: A LITTLE
MOBILITY SCORE: 9
MOVING FROM LYING ON BACK TO SITTING ON SIDE OF FLAT BED: UNABLE
WALKING IN HOSPITAL ROOM: A LOT
TURNING FROM BACK TO SIDE WHILE IN FLAT BAD: UNABLE
SUGGESTED CMS G CODE MODIFIER MOBILITY: CM
CLIMB 3 TO 5 STEPS WITH RAILING: TOTAL

## 2021-10-22 ASSESSMENT — PAIN DESCRIPTION - PAIN TYPE
TYPE: ACUTE PAIN

## 2021-10-22 NOTE — DISCHARGE INSTRUCTIONS
Discharge Instructions    Discharged to home by car with relative. Discharged via wheelchair, hospital escort: Yes.  Special equipment needed: Oxygen    Be sure to schedule a follow-up appointment with your primary care doctor or any specialists as instructed.     Discharge Plan:        I understand that a diet low in cholesterol, fat, and sodium is recommended for good health. Unless I have been given specific instructions below for another diet, I accept this instruction as my diet prescription.   Other diet:     Special Instructions: None    · Is patient discharged on Warfarin / Coumadin?   No     Depression / Suicide Risk    As you are discharged from this [facility] facility, it is important to learn how to keep safe from harming yourself.    Recognize the warning signs:  · Abrupt changes in personality, positive or negative- including increase in energy   · Giving away possessions  · Change in eating patterns- significant weight changes-  positive or negative  · Change in sleeping patterns- unable to sleep or sleeping all the time   · Unwillingness or inability to communicate  · Depression  · Unusual sadness, discouragement and loneliness  · Talk of wanting to die  · Neglect of personal appearance   · Rebelliousness- reckless behavior  · Withdrawal from people/activities they love  · Confusion- inability to concentrate     If you or a loved one observes any of these behaviors or has concerns about self-harm, here's what you can do:  · Talk about it- your feelings and reasons for harming yourself  · Remove any means that you might use to hurt yourself (examples: pills, rope, extension cords, firearm)  · Get professional help from the community (Mental Health, Substance Abuse, psychological counseling)  · Do not be alone:Call your Safe Contact- someone whom you trust who will be there for you.  · Call your local CRISIS HOTLINE 311-6725 or 914-011-6656  · Call your local Children's Mobile Crisis Response Team  [facility] (494) 552-5846 or www.[employer]  · Call the toll free National Suicide Prevention Hotlines   · National Suicide Prevention Lifeline 511-840-ULZX (2901)  · National Hope Line Network 800-SUICIDE (025-3770)      Colostomy Home Guide, Adult    Colostomy surgery is done to create an opening in the front of the abdomen for stool (feces) to leave the body through an ostomy (stoma). Part of the large intestine is attached to the stoma. A bag, also called a pouch, is fitted over the stoma. Stool and gas will collect in the bag.  After surgery, you will need to empty and change your colostomy bag as needed. You will also need to care for your stoma.  How to care for the stoma  Your stoma should look pink, red, and moist, like the inside of your cheek. Soon after surgery, the stoma may be swollen, but this swelling will go away within 6 weeks. To care for the stoma:  · Keep the skin around the stoma clean and dry.  · Use a clean, soft washcloth to gently wash the stoma and the skin around it. Clean using a circular motion, and wipe away from the stoma opening, not toward it.  ? Use warm water and only use cleansers recommended by your health care provider.  ? Rinse the stoma area with plain water.  ? Dry the area around the stoma well.  · Use stoma powder or ointment on your skin only as told by your health care provider. Do not use any other powders, gels, wipes, or creams on the skin around the stoma.  · Check the stoma area every day for signs of infection. Check for:  ? New or worsening redness, swelling, or pain.  ? New or increased fluid or blood.  ? Pus or warmth.  · Measure the stoma opening regularly and record the size. Watch for changes. (It is normal for the stoma to get smaller as swelling goes away.) Share this information with your health care provider.  How to empty the colostomy bag    Empty your bag at bedtime and whenever it is one-third to one-half full. Do not let the bag get more  than half-full with stool or gas. The bag could leak if it gets too full. Some colostomy bags have a built-in gas release valve that releases gas often throughout the day.  Follow these basic steps:  2. Wash your hands with soap and water.  3. Sit far back on the toilet seat.  4. Put several pieces of toilet paper into the toilet water. This will prevent splashing as you empty stool into the toilet.  5. Remove the clip or the hook-and-loop fastener from the tail end of the bag.  6. Unroll the tail, then empty the stool into the toilet.  7. Clean the tail with toilet paper or a moist towelette.  8. Reroll the tail, and close it with the clip or the hook-and-loop fastener.  9. Wash your hands again.  How to change the colostomy bag  Change your bag every 3-4 days or as often as told by your health care provider. Also change the bag if it is leaking or  from the skin, or if your skin around the stoma looks or feels irritated. Irritated skin may be a sign that the bag is leaking.  Always have colostomy supplies with you, and follow these basic steps:  2. Wash your hands with soap and water. Have paper towels or tissues nearby to clean any discharge.  3. Remove the old bag and skin barrier. Use your fingers or a warm cloth to gently push the skin away from the barrier.  4. Clean the stoma area with water or with mild soap and water, as directed. Use water to rinse away any soap.  5. Dry the skin. You may use the cool setting on a hair dryer to do this.  6. Use a tracing pattern (template) to cut the skin barrier to the size needed.  7. If you are using a two-piece bag, attach the bag and the skin barrier to each other. Add the barrier ring, if you use one.  8. If directed, apply stoma powder or skin barrier gel to the skin.  9. Warm the skin barrier with your hands, or blow with a hair dryer for 5-10 seconds.  10. Remove the paper from the adhesive strip of the skin barrier.  11. Press the adhesive strip onto  the skin around the stoma.  12. Gently rub the skin barrier onto the skin. This creates heat that helps the barrier to stick.  13. Apply stoma tape to the edges of the skin barrier, if desired.  14. Wash your hands again.  General recommendations  · Avoid wearing tight clothes or having anything press directly on your stoma or bag. Change your clothing whenever it is soiled or damp.  · You may shower or bathe with the bag on or off. Do not use harsh or oily soaps or lotions. Dry the skin and bag after bathing.  · Store all supplies in a cool, dry place. Do not leave supplies in extreme heat because some parts can melt or not stick as well.  · Whenever you leave home, take extra clothing and an extra skin barrier and bag with you.  · If your bag gets wet, you can dry it with a hair dryer on the cool setting.  · To prevent odor, you may put drops of ostomy deodorizer in the bag.  · If recommended by your health care provider, put ostomy lubricant inside the bag. This helps stool to slide out of the bag more easily and completely.  Contact a health care provider if:  · You have new or worsening redness, swelling, or pain around your stoma.  · You have new or increased fluid or blood coming from your stoma.  · Your stoma feels warm to the touch.  · You have pus coming from your stoma.  · Your stoma extends in or out farther than normal.  · You need to change your bag every day.  · You have a fever.  Get help right away if:  · Your stool is bloody.  · You have nausea or you vomit.  · You have trouble breathing.  Summary  · Measure your stoma opening regularly and record the size. Watch for changes.  · Empty your bag at bedtime and whenever it is one-third to one-half full. Do not let the bag get more than half-full with stool or gas.  · Change your bag every 3-4 days or as often as told by your health care provider.  · Whenever you leave home, take extra clothing and an extra skin barrier and bag with you.  This  information is not intended to replace advice given to you by your health care provider. Make sure you discuss any questions you have with your health care provider.  Document Released: 12/20/2004 Document Revised: 04/08/2020 Document Reviewed: 06/13/2018  Elsevier Patient Education © 2020 [employer]

## 2021-10-22 NOTE — THERAPY
10/22/21 0924   Other Treatments   Other Treatments Provided Follow up w/pt and family regarding d/c home today. Educated pt and family on car transfers, family is driving a truck w/running board. Encouraged pt to mobilize @ home, on/off the BSC when able. Family is still waiting for w/c to be delivered for home to assist pt to up the steps into the house until the ramp is built. Possible d/c home w/hospice, PT will be available for any d/c needs or ?'s.

## 2021-10-22 NOTE — DISCHARGE PLANNING
Meds-to-Beds: Discharge prescription orders listed below delivered to patient's bedside. RN Rema notified. Patient counseled. Patient elected to have co-payment billed to patient account.     Current Outpatient Medications   Medication Sig Dispense Refill   • thiamine (THIAMINE) 100 MG tablet Take 1 Tablet by mouth every day. 30 Tablet 0   • polyethylene glycol/lytes (MIRALAX) 17 g Pack Mix 1 Packet with liquid 1 time a day as needed (if sennosides and docusate ineffective after 24 hours) for up to 30 days. 30 Each 0   • oxyCODONE immediate-release (ROXICODONE) 5 MG Tab Take 1 Tablet by mouth every 8 hours as needed for up to 7 days. 21 Tablet 0      Lamar Mistry, PharmD

## 2021-10-22 NOTE — DISCHARGE SUMMARY
Discharge Summary    CHIEF COMPLAINT ON ADMISSION  Chief Complaint   Patient presents with   • Bowel Obstruction     pt transferred from OSH for small bowel obstruction. hx or colorectal cancer and ileostomy       Reason for Admission  EMS     Admission Date  10/10/2021    CODE STATUS  DNAR/DNI    HPI & HOSPITAL COURSE  This is a 81 -year-old female with a past medical history significant PE on Xarelto,  rectal cancer with mets to liver/lung status post liver ablation and chemo per by [facility] oncology presented to the ER on 10/10/1021 with a complaint of intractable nausea, vomiting, abdominal pain, decreased output through colostomy.     CT scan of abdomen pelvis consistent with small bowel obstruction; patient underwent conservative therapy with NG tube placement without improvement; she underwent Exploratory laparotomy lysis of adhesions repair of enterotomy on 10/13/2021.  Diet has been gradually advanced, states is tolerating diet well, her wound is healing well, surgery continue to follow the patient during the stay in the hospital and stated that patient at this time has been medically stable to be discharged home.    She is also being treated for sepsis 2/2 intra-abdominal source with Rocephin+Flagyl for 5 days. She Is also noted to have elevated procal but did complete     His hospital course is complicated with acute renal failure which improved with IV fluid resuscitation; likely secondary to prerenal etiology, creatinine 0.67     Patient noted to be frail, debilitated, unable to participate in PT/OT; She stated that she doesn't like to go to skilled.  She is no longer a candidate for antineoplastic therapy as per Oncology recs.    She has been evaluated by the palliative, after extensive discussion with the palliative team with the patient and the family member, it was decided to pursue hospice.  Patient be discharged home on home hospice.    At this time she is medically stable to be discharged home.   Patient stated that he has a lot of support in [place].       Therefore, she is discharged in good and stable condition to home with close outpatient follow-up.    The patient met 2-midnight criteria for an inpatient stay at the time of discharge.    Discharge Date  10/22/2021    FOLLOW UP ITEMS POST DISCHARGE  Manan Quiñonez M.D.      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    History of pulmonary embolus (PE) POA: Yes    BRIONNA (acute kidney injury) (HCC) POA: Yes    Cough with sputum POA: Yes    Metastatic colorectal cancer (HCC) POA: Yes    Goals of care, counseling/discussion POA: No  Resolved Problems:    Hyponatremia POA: Yes    Sepsis (HCC) POA: Yes    Metabolic acidosis, increased anion gap (IAG) POA: Yes    Problem involving surgical incision POA: No      FOLLOW UP  Future Appointments   Date Time Provider Department Center   10/27/2021  9:30 AM [facility]   11/3/2021  9:00 AM [facility]   11/10/2021  1:30 PM Manan Quiñonez M.D. [facility]   1/5/2022 10:30 AM Noel Bello D.O. [facility] None   3/30/2022  1:50 PM Jannet Ventura M.D. [facility] None     Camilo Faust M.D.  [address] [facility] [address]  [phone]    In 1 week        MEDICATIONS ON DISCHARGE     Medication List      START taking these medications      Instructions   oxyCODONE immediate-release 5 MG Tabs  Commonly known as: ROXICODONE   Take 1 Tablet by mouth every 8 hours as needed for up to 7 days.  Dose: 5 mg     polyethylene glycol/lytes 17 g Pack  Commonly known as: MIRALAX   Mix 1 Packet with liquid 1 time a day as needed (if sennosides and docusate ineffective after 24 hours) for up to 30 days.  Dose: 17 g     thiamine 100 MG tablet  Commonly known as: THIAMINE   Take 1  Tablet by mouth every day.  Dose: 100 mg        CONTINUE taking these medications      Instructions   * albuterol 108 (90 Base) MCG/ACT Aers inhalation aerosol   Doctor's comments: Ventolin if covered is the preferred brand BUT what ever the insurance allows  Inhale 2 Puffs every 6 hours as needed for Shortness of Breath.  Dose: 2 Puff     * albuterol 2.5mg/3ml Nebu solution for nebulization  Commonly known as: PROVENTIL   Take 3 mL by nebulization every four hours as needed for Shortness of Breath.  Dose: 2.5 mg     CENTRUM SILVER PO   Take 1 Tab by mouth every day.  Dose: 1 Tablet     cyanocobalamin 500 MCG Tabs  Commonly known as: VITAMIN B-12   Take 1,000 mcg by mouth every day.  Dose: 1,000 mcg     lidocaine-prilocaine 2.5-2.5 % Crea  Commonly known as: EMLA   Doctor's comments: Directions: Apply to port 1 hour prior to access of port and cover with plastic wrap.  Apply to port one hour prior to access and cover with plastic wrap.     ondansetron 4 MG Tabs tablet  Commonly known as: ZOFRAN   Take 1 tablet by mouth every four hours as needed for Nausea/Vomiting (for nausea, vomiting).  Dose: 4 mg     sodium chloride 7 % Nebu  Commonly known as: HYPER-SAL   Take 4 mL by nebulization every day.  Dose: 4 mL     Stiolto Respimat 2.5-2.5 MCG/ACT Aers  Generic drug: Tiotropium Bromide-Olodaterol   Inhale 2 Puffs every day.  Dose: 2 Puff     Vitamin D3 10 MCG (400 UNIT) Caps   Take 1 Cap by mouth every day.  Dose: 1 Capsule     Xarelto 10 MG Tabs tablet  Generic drug: rivaroxaban   Take 10 mg by mouth with dinner. Indications: Blockage of Blood Vessel to Lung by a Particle  Dose: 10 mg         * This list has 2 medication(s) that are the same as other medications prescribed for you. Read the directions carefully, and ask your doctor or other care provider to review them with you.            STOP taking these medications    loratadine 10 MG Tabs  Commonly known as: CLARITIN     potassium chloride ER 10 MEQ  "tablet  Commonly known as: KLOR-CON            Allergies  Allergies   Allergen Reactions   • Oxaliplatin Anaphylaxis   • Amoxicillin Rash     Rash all over body   • Codeine      \"gets drunk\"   • Pcn [Penicillins] Itching     itching   • Sulfa Drugs Itching     itching   • Tape Rash     PAPER TAPE OK       DIET  Orders Placed This Encounter   Procedures   • Diet Order Diet: Low Fiber(GI Soft)     Standing Status:   Standing     Number of Occurrences:   1     Order Specific Question:   Diet:     Answer:   Low Fiber(GI Soft) [2]       ACTIVITY  As tolerated.  Weight bearing as tolerated    CONSULTATIONS  Surgery  Oncology    PROCEDURES    Exploratory laparotomy lysis of adhesions repair of enterotomy on 10/13/2021    LABORATORY  Lab Results   Component Value Date    SODIUM 144 10/18/2021    POTASSIUM 3.9 10/18/2021    CHLORIDE 107 10/18/2021    CO2 32 10/18/2021    GLUCOSE 170 (H) 10/18/2021    BUN 22 10/18/2021    CREATININE 0.67 10/18/2021        Lab Results   Component Value Date    WBC 9.3 10/18/2021    HEMOGLOBIN 9.6 (L) 10/18/2021    HEMATOCRIT 32.5 (L) 10/18/2021    PLATELETCT 228 10/18/2021        Total time of the discharge process exceeds 35 minutes.  "

## 2021-10-22 NOTE — CARE PLAN
Problem: Nutritional:  Goal: Achieve adequate nutritional intake  Description: Patient will start and tolerate PO diet.  Outcome: Discharged - Not Met     Patient discharging with hospice per MD note.

## 2021-10-22 NOTE — CARE PLAN
"The patient is Watcher - Medium risk of patient condition declining or worsening    Shift Goals  Clinical Goals: Maintain Skin integrity, Ostomy care and output.   Patient Goals: Rest, Pain Relief  Family Goals: No family present    Progress made toward(s) clinical / shift goals:    Problem: Pain - Standard  Goal: Alleviation of pain or a reduction in pain to the patient’s comfort goal  Outcome: Progressing     Problem: Knowledge Deficit - Standard  Goal: Patient and family/care givers will demonstrate understanding of plan of care, disease process/condition, diagnostic tests and medications  Outcome: Progressing     Problem: Respiratory  Goal: Patient will achieve/maintain optimum respiratory ventilation and gas exchange  Outcome: Progressing     \"Covid-19 Surge in Effect\"      "

## 2021-10-22 NOTE — CARE PLAN
The patient is Stable - Low risk of patient condition declining or worsening    Shift Goals  Clinical Goals: ostomy care, discharge  Patient Goals: discharge, pain releif  Family Goals: No family present    Progress made toward(s) clinical / shift goals:      Problem: Pain - Standard  Goal: Alleviation of pain or a reduction in pain to the patient’s comfort goal  Outcome: Progressing     Problem: Knowledge Deficit - Standard  Goal: Patient and family/care givers will demonstrate understanding of plan of care, disease process/condition, diagnostic tests and medications  Outcome: Progressing     Problem: Skin Integrity  Goal: Skin integrity is maintained or improved  Outcome: Progressing       Patient is not progressing towards the following goals:

## 2021-10-25 ENCOUNTER — APPOINTMENT (OUTPATIENT)
Dept: HEMATOLOGY ONCOLOGY | Facility: MEDICAL CENTER | Age: 81
End: 2021-10-25
Payer: MEDICARE

## 2021-10-27 ENCOUNTER — APPOINTMENT (OUTPATIENT)
Dept: ONCOLOGY | Facility: MEDICAL CENTER | Age: 81
End: 2021-10-27
Attending: INTERNAL MEDICINE
Payer: MEDICARE

## 2021-11-03 ENCOUNTER — APPOINTMENT (OUTPATIENT)
Dept: ONCOLOGY | Facility: MEDICAL CENTER | Age: 81
End: 2021-11-03
Attending: STUDENT IN AN ORGANIZED HEALTH CARE EDUCATION/TRAINING PROGRAM
Payer: MEDICARE

## 2022-09-20 NOTE — PROGRESS NOTES
"Pharmacy Chemotherapy Verification Note:    Patient Name: Karlene Walters      Dx: Colon Ca        Protocol: 5-FU +        *Dosing Reference*                                    -- 10/31/17: delete Leucovorin and 5-FU push d/t neutropenia per Dr. Hadry   Fluorouracil 2400 mg/m² continuous infusion over 46-48 hours                              -- 20% reduction (1920 mg/m²) to be continued starting C28 per C Alsop APRN   14 day cycles for 12 cycles (adjuvant) or until disease progression or unacceptable toxicity    NCCN Guidelines for Colon Cancer. V.2.2015.  Jay GROVER, et al. Eur J Cancer.1999;35(9):1343-7.     Allergies:  Oxaliplatin; Codeine; Pcn [penicillins]; Sulfa drugs; and Tape     /79   Pulse (!) 109   Temp 36.2 °C (97.2 °F) (Temporal)   Resp 18   Ht 1.64 m (5' 4.57\")   Wt 73.2 kg (161 lb 6 oz)   LMP  (LMP Unknown)   SpO2 98%   BMI 27.22 kg/m²  Body surface area is 1.83 meters squared.    All lab results 11/3/20 within treatment parameters.        Drug Order   (Drug name, dose, route, IV Fluid & volume, frequency, number of doses) Cycle 116 (Waite cycle 108)     Previous treatment: 10/20/20     Medication = Fluorouracil (5-FU)  Base Dose = 1920 mg/m²  Calc Dose: Base Dose x 1.83m² =  3513mg  Final Dose = 3515mg  Route = CIVI   Fluid & Volume = 70.3 mL (+ 3 mL overfill)  Admin Duration = Over 46 hours (to run at 1.5 mL/hour)   Via CADD pump for home infusion       <10% difference, okay to treat with final written dose     By my signature below, I confirm this process was performed independently with the BSA and all final chemotherapy dosing calculations congruent. I have reviewed the above chemotherapy order and that my calculation of the final dose and BSA (when applicable) corroborate those calculations of the  pharmacist.     Nakia Arroyo, PharmD    "
"Pharmacy Chemotherapy Verification Note:    Patient Name: Karlene Walters      Dx: Colon Ca      Protocol: 5-FU + Leucovorin       Cycle 116 (Coral cycle 108)     Previous treatment: 10/20/20    Leucovorin 400 mg/m² IV over 2 hours on Day 1, followed by  Fluorouracil 400 mg/m²  IVP on Day 1, followed by     7/5/16: Dose reduced by 20% to 320 mg/m² starting with Cycle 18 (Coral #11)  due to neutropenia     10/31/17: delete Leucovorin and 5-FU push d/t neutropenia per Dr. Hardy   Fluorouracil 2400 mg/m² continuous infusion over 46-48 hours     7/5/16: Infusion Dose reduced by 20% starting with Cycle 18 (Coral #11)  due to neutropenia    20% reduction (1920 mg/m²) to be continued starting C28 per C Alsop APRN    14 day cycles for 12 cycles (adjuvant) or until disease progression or unacceptable toxicity  *Dosing Reference*   NCCN Guidelines for Colon Cancer. V.2.2015.  Jay T, et al. Eur J Cancer.1999;35(9):1343-7.     Allergies:  Oxaliplatin; Codeine; Pcn [penicillins]; Sulfa drugs; and Tape     Ht 1.64 m (5' 4.57\")   Wt 73.2 kg (161 lb 6 oz)   LMP  (LMP Unknown)   BMI 27.22 kg/m²  Body surface area is 1.83 meters squared.    Labs 11/3/20:  ANC~ 4190 Plt = 238k   Hgb = 13     SCr = 1.08 mg/dL CrCl ~ 48 mL/min   AST/ALT/ALK = 22/19/182 TBili = 0.7     Fluorouracil (5-FU) 1920 mg/m² x 1.83 m² =  3513.6 mg   <10% difference, okay to treat with final dose = 3515 mg CIVI              Via CADD pump for home infusion over 46 hours @ 1.5 mL/hr    Asha Krishnan, PharmD      "
Chemotherapy Verification - PRIMARY RN      Height = 164 cm  Weight = 73.2 kg  BSA = 1.83 m2       Medication: Adrucil  Dose: 1,920 mg/m2  Calculated Dose: 3,513.6 mg over 46 hours                             (In mg/m2, AUC, mg/kg)           I confirm this process was performed independently with the BSA and all final chemotherapy dosing calculations congruent.  Any discrepancies of 10% or greater have been addressed with the chemotherapy pharmacist. The resolution of the discrepancy has been documented in the EPIC progress notes.       
Chemotherapy Verification - SECONDARY RN       Height = 164 cm  Weight = 73.2 kg  BSA = 1.826 m2       Medication: Home infusion 5FU  Dose: 1920 mg/m2  Calculated Dose: 3505.92 mg over 46 hours                             (In mg/m2, AUC, mg/kg)     I confirm that this process was performed independently.   
[facility]. Reviewed plan of care, patient verbalizes understanding. Port accessed earlier this morning flushes well with brisk blood return. Infusion pump connected per MD orders. Patient returns Thursday and released in no acute distress.  
Yes - the patient is able to be screened

## 2022-10-06 NOTE — PROGRESS NOTES
Pt is here for her scheduled labs/5-FU pump connect. In good spirits - no concerns.  present. Port accessed in sterile fashion (EMLA cream in use). Pt prefers regular Tegaderm today. Labs drawn and reviewed. Premedicated and connected to 5-FU pump by Leslye Drwe, AZEB/Geraldo Hall RN. Volume to be delivered 72.6ml (75.6ml with overfill). Discharged home to self care. Next appointment confirmed.   Benzoyl Peroxide Pregnancy And Lactation Text: This medication is Pregnancy Category C. It is unknown if benzoyl peroxide is excreted in breast milk.

## 2022-11-30 NOTE — PROGRESS NOTES
Chemotherapy Verification - SECONDARY RN       Height = 165.5 cm  Weight = 77 kg  BSA = 1.88 m2       Medication: Flurorouracil  Dose: 1920 mg/m2  Calculated Dose: 3609.6 mg                             (In mg/m2, AUC, mg/kg)       I confirm that this process was performed independently.    no edema, no murmurs, regular rate and rhythm

## 2024-01-01 NOTE — PROGRESS NOTES
"Pharmacy Chemotherapy Verification    Patient Name: Karlene Walters   Dx: Colon CA        Cycle 91 ([place] cycle 83)    Previous treatment: 8/27/19    Protocol: 5-FU + Leucovorin      IV over 2 hours on Day 1, followed by    IVP on Day 1, followed by                               -- 10/31/17: delete Leucovorin and 5-FU push d/t neutropenia per Dr. Hardy   Fluorouracil  continuous infusion over 46-48 hours                              -- 20% reduction (1920 mg/m²) to be continued starting C28 per C Alsop APRN   14 day cycles for 12 cycles (adjuvant) or until disease progression or unacceptable toxicity  NCCN Guidelines for Colon Cancer. V.2.2015.  Jay T, et al. Eur J Cancer.1999;35(9):1343-7.      Allergies: Codeine; Oxaliplatin; Pcn; Sulfa drugs; and Tape       /86   Pulse 96   Temp 36.3 °C (97.4 °F) (Temporal)   Resp 18   Ht 1.645 m (5' 4.75\") Comment: 1 inch off shoes.  Wt 76.7 kg (169 lb 1.5 oz)   LMP  (LMP Unknown)   SpO2 99%   BMI 28.36 kg/m²  Body surface area is 1.87 meters squared.    All lab results 9/9/19 within treatment parameters.       Fluorouracil (5-FU) 1920 mg/m² x 1.87m² = 3590mg              <10% difference, OK to treat with final dose = 3590mg CIVI via CADD pump @ 1.6mL/hr over 46 hrs.      DARIN Arroyo Pharm.D.              " 

## 2024-01-02 NOTE — PROGRESS NOTES
"Pharmacy Chemotherapy Verification    Patient Name: Karlene Walters   Dx: Colon CA        Cycle 98 ([place] cycle 90)    Previous treatment: 12/03/19    Protocol: 5-FU     IV over 2 hours on Day 1, followed by    IVP on Day 1, followed by                               -- 10/31/17: delete Leucovorin and 5-FU push d/t neutropenia per Dr. Hardy   Fluorouracil  continuous infusion over 46-48 hours                              -- 20% reduction (1920 mg/m²) to be continued starting C28 per C Alsop APRN   14 day cycles for 12 cycles (adjuvant) or until disease progression or unacceptable toxicity  NCCN Guidelines for Colon Cancer. V.2.2015.  Jay T, et al. Eur J Cancer.1999;35(9):1343-7.      Allergies: Codeine; Oxaliplatin; Pcn; Sulfa drugs; and Tape       /60   Pulse 100   Temp 36.1 °C (97 °F) (Temporal)   Resp 18   Ht 1.645 m (5' 4.76\")   Wt 76.8 kg (169 lb 5 oz)   LMP  (LMP Unknown)   SpO2 100%   BMI 28.38 kg/m²  Body surface area is 1.87 meters squared.    Labs 12/17/19:  ANC~ 3200             Plt = 246 k           Hgb = 12.1 SCr = 1.08 mg/dL       CrCl = 51 mL/min         LFT = WNL except alk phos 132              TBili = 0.6      Fluorouracil (5-FU) 1920 mg/m² x 1.87 m² = 3590mg              <10% difference, OK to treat with final dose = 3590 mg    CIVI via CADD pump @ 1.6mL/hr over 46 hrs       Lalit Andrade, PharmD, BCOP                        "
"Pharmacy Chemotherapy Verification Note:    Patient Name: Karlene Walters      Dx: Colon Ca      Protocol: 5-FU +        *Dosing Reference*                                    -- 10/31/17: delete Leucovorin and 5-FU push d/t neutropenia per Dr. Hardy   Fluorouracil 2400 mg/m² continuous infusion over 46-48 hours                              -- 20% reduction (1920 mg/m²) to be continued starting C28 per C Alsop APRN   14 day cycles for 12 cycles (adjuvant) or until disease progression or unacceptable toxicity    NCCN Guidelines for Colon Cancer. V.2.2015.  Jay T, et al. Eur J Cancer.1999;35(9):1343-7.     Allergies:  Oxaliplatin; Codeine; Pcn [penicillins]; Sulfa drugs; and Tape     /60   Pulse 100   Temp 36.1 °C (97 °F) (Temporal)   Resp 18   Ht 1.645 m (5' 4.76\")   Wt 76.8 kg (169 lb 5 oz)   LMP  (LMP Unknown)   SpO2 100%   BMI 28.38 kg/m²  Body surface area is 1.87 meters squared.    Labs 12/17/19  ANC~ 3240 Plt = 246k   Hgb = 12.1     SCr = 1.08 mg/dL CrCl ~ 51.2 mL/min   LFT's = WNL TBili = 0.6     Drug Order   (Drug name, dose, route, IV Fluid & volume, frequency, number of doses) Cycle: 98 ([place] cycle 90)      Previous treatment: 12/3/19     Medication = Fluorouracil (5-FU)  Base Dose = 1920 mg/m²  Calc Dose:Base Dose x 1.87 m²=3590.4mg  Final Dose = 3590 mg  Route = CIVI via CADD pump  Fluid & Volume = 71.8 mL (+ 3mL overfill = 74.8 mL)  Admin Duration = Over 46 hours (to run at 1.6 mL/hour)   via CADD pump       <10% difference, okay to treat with final written dose     By my signature below, I confirm this process was performed independently with the BSA and all final chemotherapy dosing calculations congruent. I have reviewed the above chemotherapy order and that my calculation of the final dose and BSA (when applicable) corroborate those calculations of the  pharmacist.     Asha Finch, PharmD    "
Chemotherapy Verification - PRIMARY RN      Height = 164.5 cm  Weight = 76.8 kg  BSA = 1.87 m2       Medication: Fluorouracil  Dose: 1,920 mg/m2  Calculated Dose: 3,590.4 mg                             (In mg/m2, AUC, mg/kg)       I confirm this process was performed independently with the BSA and all final chemotherapy dosing calculations congruent.  Any discrepancies of 10% or greater have been addressed with the chemotherapy pharmacist. The resolution of the discrepancy has been documented in the EPIC progress notes.       
Chemotherapy Verification - SECONDARY RN       Height = 164.5 cm  Weight = 76.8 kg  BSA = 1.87 m2       Medication: Adrucil  Dose: 1,920 mg/m2  Calculated Dose: 3,590.4 mg over 46 hours                             (In mg/m2, AUC, mg/kg)       I confirm that this process was performed independently.   
Patient returning RN's call. Patient is available anytime today on her cell phone ([phone]). Please call.  
Pt arrived to [facility], ambulatory, for 5FU. Pt voices no complaints. Port (accessed this morning) flushed per policy, positive blood return noted. Labs reviewed, pt within parameters to treat today. Dexamethasone administered with no s/sx of adverse reaction. 5FU CADD pump connected with no complications. Pt left [facility] with no s/sx of distress. Follow up appointment confirmed.  
General

## 2024-03-26 NOTE — PATIENT INSTRUCTIONS
The patient will be scheduled for surgery on May 28, 2020  
[Follow-Up: _____] : a [unfilled] follow-up visit

## 2024-11-08 NOTE — PROGRESS NOTES
Chemotherapy Verification - PRIMARY RN    C122    Height = 162.6 cm  Weight = 68.1 kg  BSA = 1.75 m2       Medication: Fluorouracil (5FU) CADD pump  Dose: 1920 mg/m2  Calculated Dose: 3360 mg over 46 hours                                I confirm this process was performed independently with the BSA and all final chemotherapy dosing calculations congruent.  Any discrepancies of 10% or greater have been addressed with the chemotherapy pharmacist. The resolution of the discrepancy has been documented in the EPIC progress notes.        Submitted PA for Solu Cortef  Via [facility] [place]: [id number]   STATUS: Drug is covered by current benefit plan. No further PA activity needed     If this requires a response please respond to the pool ( P MHCX PSC MEDICATION PRE-AUTH).      Thank you please advise patient.

## 2024-12-31 NOTE — PROGRESS NOTES
PT RECEIVED IN PACU 2 FROM PACU REPORT RECEIVED PT IN 10/10 PAIN , DR HUNG SAW PT PRIOR TO COMING TO PACU 2 AND INSTRUCTED TO GIVE PT PAIN PILL AND SEE HOW PT DOES.    1150 PT MEDICATED FOR PAIN SEE [facility] FAMILY AT BEDSIDE AND UPDATED  1205 HANDOFF OF CARE REPORT GIVEN TO NIGEL GRIFFITHS AT THIS TIME   Subjective:      Karlene Walters is a 79 y.o. female who presents for Rectal Cancer (Prechemo)      HPI   Mrs. Walters presents for evaluation prior to cycle 94 single agent 5-FU for well-differentiated rectal cancer with liver mets: KRAS mutated.  She is unaccompanied for today's visit.    Patient was initially diagnosed 2012 secondary to bleeding.  She underwent laparoscopic resection with primary anastomosis per Dr. Montgomery with pathology indicating well differentiated T2N0 adenocarcinoma of the rectum.  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  Patient required diverting loop ostomy with subsequent associated surgeries.  Patient was noted liver mass in June 2015 for which he underwent ablative therapy and was initiated on XELOX.  She experienced allergic reaction to oxaliplatin and continued with Xeloda.  She experienced diarrhea and transitioned to single agent 5-FU in February 2016.  She continues with 20% dose reduction since cycle 21 (12/2016) and discontinuation of bolus 5-FU/LV at cycle 39, due to neutropenia and poor tolerance. PET scan completed 5/2017 showing small pulmonary nodules and isolated hepatic lesion. She completed Y90 radio embolization of liver lesion on 6/15/2017, without sequelae. S/p overlapping liver ablations x 3 per Dr. ROJAS on 11/15/18 for localized recurrence per PET completed 10/25/18 (following short break in treatment). CEA is improved and stable since ablation in November 2018. She continues with single agent 5FU for disease control and transitioned from q 14 days dosing to q 21 days in January 2020 to help with fatigue and tolerance.    Patient is feeling well and experiences an increase in fatigue on days of treatment.  She also experienced increased myalgia on the night of chemo as well as restless legs.  She continues with intermittent palpitations that are self-limiting and she is followed by cardiology every 3 months.  Intermittent edema  "is well addressed with Lasix as needed.  Ostomy output correlates with hydration and she adjust accordingly.  Patient reports that she is more active with change in dosing to every 21 days.  She is otherwise asymptomatic.        Allergies   Allergen Reactions   • Oxaliplatin Anaphylaxis   • Codeine      \"gets drunk\"   • Pcn [Penicillins] Itching   • Sulfa Drugs Itching   • Tape Rash     PAPER TAPE OK           Current Outpatient Medications on File Prior to Visit   Medication Sig Dispense Refill   • STIOLTO RESPIMAT 2.5-2.5 MCG/ACT Aero Soln TAKE 2 PUFFS EVERY DAY 1 Inhaler 0   • potassium chloride ER (KLOR-CON) 10 MEQ tablet      • albuterol (PROAIR HFA) 108 (90 Base) MCG/ACT Aero Soln inhalation aerosol Inhale 2 Puffs by mouth every four hours as needed for Shortness of Breath (wheezing). 1 Inhaler 11   • XARELTO 10 MG Tab tablet TAKE 1 TABLET EVERY DAY 90 Tab 1   • furosemide (LASIX) 40 MG Tab Take 1 Tab by mouth every day. 90 Tab 3   • lidocaine-prilocaine (EMLA) 2.5-2.5 % Cream APPLY A THICK FILM OVER THE PORT ACCESS SITE PRIOR TO ACCESS 30 g 3   • ondansetron (ZOFRAN) 4 MG Tab tablet Take 1 Tab by mouth as needed. 30 Tab 3   • metronidazole (METROCREAM) 0.75 % cream Apply 1 Each to affected area(s) 2 times a day.     • loperamide (IMODIUM) 2 MG Cap Take 2 mg by mouth 4 times a day as needed for Diarrhea.     • cyanocobalamin (VITAMIN B-12) 500 MCG Tab Take 1,000 mcg by mouth every day.     • Multiple Vitamins-Minerals (CENTRUM SILVER PO) Take 1 Tab by mouth every day.     • Cholecalciferol (VITAMIN D3) 400 UNITS CAPS Take 1 Cap by mouth every day.     • loratadine (CLARITIN) 10 MG TABS Take 10 mg by mouth every day. Indications: Hayfever       Current Facility-Administered Medications on File Prior to Visit   Medication Dose Route Frequency Provider Last Rate Last Dose   • fluorouracil (ADRUCIL) 3,630 mg in CADD Cassette/Bag Chemo infusion (for use in CADD PUMP)  1,920 mg/m2 Intravenous Once Ella De, " "A.P.N.   3,630 mg at 02/25/20 0941         Review of Systems   Constitutional: Positive for malaise/fatigue (depends on the day). Negative for chills, fever and weight loss.   Respiratory: Positive for cough (allergy w/ mild COPD) and shortness of breath (with exertion). Negative for wheezing.    Cardiovascular: Positive for palpitations (intermittent and self limiting - followed by cards q 3 mo). Negative for chest pain and leg swelling.        Lasix as needed   Gastrointestinal: Positive for diarrhea (correlates with ileostomy - immodium as needed) and nausea (intermittent - relieved with zofran PRN). Negative for constipation and vomiting.        Loop ileostomy   Genitourinary: Negative for dysuria.        Not great output despite intake - out per ileostomy   Musculoskeletal: Positive for myalgias (night of chemo mainly (restless legs)).   Neurological: Negative for dizziness, tingling and headaches.   Endo/Heme/Allergies: Positive for environmental allergies.          Objective:     /72 (BP Location: Right arm, Patient Position: Sitting, BP Cuff Size: Adult)   Pulse 92   Temp 36.6 °C (97.9 °F) (Temporal)   Resp 18   Ht 1.66 m (5' 5.35\")   Wt 77.9 kg (171 lb 10.1 oz)   LMP  (LMP Unknown)   SpO2 99%   BMI 28.25 kg/m²          Physical Exam  Vitals signs reviewed.   Constitutional:       General: She is not in acute distress.     Appearance: She is well-developed. She is not diaphoretic.   HENT:      Head: Normocephalic and atraumatic.      Comments: alopecia     Mouth/Throat:      Pharynx: No oropharyngeal exudate.   Eyes:      General: No scleral icterus.        Right eye: No discharge.         Left eye: No discharge.      Conjunctiva/sclera: Conjunctivae normal.      Pupils: Pupils are equal, round, and reactive to light.   Neck:      Musculoskeletal: Normal range of motion and neck supple.   Cardiovascular:      Rate and Rhythm: Normal rate and regular rhythm.      Heart sounds: Normal heart " sounds. No murmur. No friction rub. No gallop.    Pulmonary:      Effort: Pulmonary effort is normal. No respiratory distress.      Breath sounds: Normal breath sounds. No wheezing.   Abdominal:      General: Bowel sounds are normal. There is no distension.      Palpations: Abdomen is soft.      Tenderness: There is no abdominal tenderness.      Comments: ileostomy   Musculoskeletal: Normal range of motion.   Skin:     General: Skin is warm and dry.      Coloration: Skin is not pale.      Findings: No erythema or rash.   Neurological:      Mental Status: She is alert and oriented to person, place, and time.   Psychiatric:         Behavior: Behavior normal.           [facility] on 02/25/2020   Component Date Value Ref Range Status   • WBC 02/25/2020 3.1* 4.8 - 10.8 K/uL Final   • RBC 02/25/2020 4.49  4.20 - 5.40 M/uL Final   • Hemoglobin 02/25/2020 12.1  12.0 - 16.0 g/dL Final   • Hematocrit 02/25/2020 39.8  37.0 - 47.0 % Final   • MCV 02/25/2020 88.6  81.4 - 97.8 fL Final   • MCH 02/25/2020 26.9* 27.0 - 33.0 pg Final   • MCHC 02/25/2020 30.4* 33.6 - 35.0 g/dL Final   • RDW 02/25/2020 64.4* 35.9 - 50.0 fL Final   • Platelet Count 02/25/2020 263  164 - 446 K/uL Final   • MPV 02/25/2020 10.1  9.0 - 12.9 fL Final   • Neutrophils-Polys 02/25/2020 57.90  44.00 - 72.00 % Final   • Lymphocytes 02/25/2020 24.50  22.00 - 41.00 % Final   • Monocytes 02/25/2020 12.30  0.00 - 13.40 % Final   • Eosinophils 02/25/2020 5.30  0.00 - 6.90 % Final   • Basophils 02/25/2020 0.00  0.00 - 1.80 % Final   • Nucleated RBC 02/25/2020 0.00  /100 WBC Final   • Neutrophils (Absolute) 02/25/2020 1.79* 2.00 - 7.15 K/uL Final    Includes immature neutrophils, if present.   • Lymphs (Absolute) 02/25/2020 0.76* 1.00 - 4.80 K/uL Final   • Monos (Absolute) 02/25/2020 0.38  0.00 - 0.85 K/uL Final   • Eos (Absolute) 02/25/2020 0.16  0.00 - 0.51 K/uL Final   • Baso (Absolute) 02/25/2020 0.00  0.00 - 0.12 K/uL Final   • NRBC (Absolute)  02/25/2020 0.00  K/uL Final   • Anisocytosis 02/25/2020 1+   Final   • Microcytosis 02/25/2020 1+   Final   • Sodium 02/25/2020 135  135 - 145 mmol/L Final   • Potassium 02/25/2020 3.6  3.6 - 5.5 mmol/L Final   • Chloride 02/25/2020 103  96 - 112 mmol/L Final   • Co2 02/25/2020 22  20 - 33 mmol/L Final   • Anion Gap 02/25/2020 10.0  0.0 - 11.9 Final   • Glucose 02/25/2020 155* 65 - 99 mg/dL Final   • Bun 02/25/2020 17  8 - 22 mg/dL Final   • Creatinine 02/25/2020 1.15  0.50 - 1.40 mg/dL Final   • Calcium 02/25/2020 9.6  8.5 - 10.5 mg/dL Final   • AST(SGOT) 02/25/2020 33  12 - 45 U/L Final   • ALT(SGPT) 02/25/2020 24  2 - 50 U/L Final   • Alkaline Phosphatase 02/25/2020 124* 30 - 99 U/L Final   • Total Bilirubin 02/25/2020 0.7  0.1 - 1.5 mg/dL Final   • Albumin 02/25/2020 4.0  3.2 - 4.9 g/dL Final   • Total Protein 02/25/2020 6.7  6.0 - 8.2 g/dL Final   • Globulin 02/25/2020 2.7  1.9 - 3.5 g/dL Final   • A-G Ratio 02/25/2020 1.5  g/dL Final   • Carcinoembryonic Antigen 02/25/2020 1.2  0.0 - 3.0 ng/mL Final    Comment: Effective September 17, 2013 the quantitative determination  of Carcinoembryonic Antigen (CEA) will now be performed at  [facility].  The Access CEA paramagnetic  particle chemiluminescent immunoassay is used.  Results  obtained with different test methods or kits cannot be used interchangeably.  Measurement of CEA has been shown to be  clinically relevant in the management of patients with  colorectal, breast, lung, prostatic, pancreatic, and ovarian  carcinomas.  Smokers may have slightly elevated levels of CEA.     • GFR If  02/25/2020 55* >60 mL/min/1.73 m 2 Final   • GFR If Non  02/25/2020 45* >60 mL/min/1.73 m 2 Final   • Manual Diff Status 02/25/2020 PERFORMED   Final   • Peripheral Smear Review 02/25/2020 see below   Final    Comment: Due to instrument suspect flags, further review of peripheral smear is  indicated on this patient sample. This  review may or may not result in  abnormal findings.     • Plt Estimation 02/25/2020 Normal   Final   • RBC Morphology 02/25/2020 Present   Final   • Polychromia 02/25/2020 1+   Final   • Reactive Lymphocytes 02/25/2020 Few   Final                   CT Abd  12/30/2019 10:26 AM  HISTORY/REASON FOR EXAM:  Neoplasm: colorectal.  Colorectal cancer. Metastatic disease to the liver. Ablation.     TECHNIQUE/EXAM DESCRIPTION:  Triphasic CT scan of the abdomen without and with contrast.     Initial precontrast images were obtained from the diaphragmatic domes through the iliac crests using helical technique.  Following nonionic contrast administration in an intravenous bolus fashion, and postcontrast, thin-section helical scanning obtained   from the diaphragmatic domes through the iliac crests.  Imaging was obtained in portal venous and arterial phases.     100 mL of Omnipaque 350 nonionic contrast was administered.     Low dose optimization technique was utilized for this CT exam including automated exposure control and adjustment of the mA and/or kV according to patient size.     COMPARISON: 9/9/2019     FINDINGS:  Liver morphology: Liver is heterogeneous.     Focal liver findings:  Lesion 1: Dome of the liver 3.2 x 3.3 cm mass previously measured 3.7 x 3.7 cm.  Adjacent heterogeneous enhancement on arterial phase imaging again demonstrated. There is a 9.9 mm focal enhancement along the medial rim of the lesion that could indicate recurrence.     There are calcifications within the periventricular the lesion similar to previous.  Tubular hypodensities peripheral to the lesion again demonstrated and represent dilated biliary ducts.     Lesion 2: No new lesions identified.        Hepatic arterial vasculature: Hepatic artery is patent and appears normal.     Portal vein: The portal vein enhances normally and is patent.     Portal vein diameter: 10.5%. mm     Splenic vein: The splenic vein is patent.     Biliary system: The  gallbladder is not identified consistent surgical absence.     Relative extrahepatic findings:     Other organs:  Splenic length: 1.4 cm     Pancreas: Pancreas is unremarkable.     Kidneys: Kidneys are unremarkable.  Adrenals: Adrenal glands are unremarkable.     Lymph nodes: There are no enlarged nodes.     Bowel: There has been colon resection. Right lower quadrant ostomy is demonstrated. Large and small bowel incompletely imaged.  No free fluid within the mid and upper abdomen.     Moderate calcified plaque aorta and iliac arteries.     Pelvis: None scanned.     Lung bases: There is ill-defined opacity at the anterior dome of the right hemidiaphragm adjacent to the liver lesion. Ill-defined opacity extends within the right lower lobe to the margin major fissure. Infiltrate measures 8.7 mm in diameter.     Linear atelectasis within left lung base. No basilar pleural effusion.     Bones: Lumbar spine degenerative changes.        IMPRESSION:  The liver lesion decrease in size currently measuring 3.3 cm as compared to 3.7 cm previously.  Heterogeneous marginal enhancement again demonstrated. There is 9.9 mm focal enhancement along the medial rim of the lesion that could indicate recurrence.  There is ill-defined opacity at the anterior dome of the right hemidiaphragm adjacent to the liver lesion. Question infiltration extending into the base of the right lower lobe contiguous with the major fissure. This could be inflammatory or neoplastic.  No new liver lesions identified.  Bowel resections and right lower quadrant ostomy again demonstrated.                Assessment/Plan:       1. Malignant neoplasm of rectum (HCC)     2. Secondary malignant neoplasm of liver (HCC)     3. Encounter for long-term current use of high risk medication     4. History of pulmonary embolism     5. Current use of long term anticoagulation     6. Decreased GFR     7. Renal insufficiency         1.  PE/anticoagulation: She continues on  Xarelto 10 mg daily for indefinite treatment of underlying malignancy.    2.  Renal insufficiency: Decreased GFR stable for patient given loop ileostomy, will continue to monitor.    3.   Liver mets: She is status post Y90 radio embolization of liver lesion on 6/15/2017, without sequelae. S/p overlapping liver ablations x 3 per Dr. ROJAS on 11/15/18 for localized recurrence per PET completed 10/25/18 (following short break in treatment). CEA is improved and stable since ablation in November 2018. She continues to follow-up with  with imaging from December 2019 showing stable/improved results.  We will continue to monitor.    4.  Rectal cancer: Patient initially diagnosed in 2012. She has been undergoing treatment with single agent 5-FU since February 2016.  She has intermittently undergone ablative procedures as well as Y90 for isolated hepatic lesions, most recently in November 2018.  CEA is normalized and remains stable.  CBC, CMP, CEA have been evaluated and found to be within acceptable limits.  Patient will proceed with cycle 94 5-FU and return in 3 weeks for evaluation prior to cycle 95, sooner as needed.            The patient verbalized agreement and understanding of current plan. All questions and concerns were addressed at time of visit.    Please note that this dictation was created using voice recognition software. I have made every reasonable attempt to correct obvious errors, but I expect that there are errors of grammar and possibly content that I did not discover before finalizing the note.

## (undated) DEVICE — SUTURE 0 COATED VICRYL 6-18IN - (12PK/BX)

## (undated) DEVICE — SET EXTENSION WITH 2 PORTS (48EA/CA) ***PART #2C8610 IS A SUBSTITUTE*****

## (undated) DEVICE — DRESSING LEUKOMED STERILE 11.75X4IN - (50/CA)

## (undated) DEVICE — MASK ANESTHESIA ADULT  - (100/CA)

## (undated) DEVICE — TROCAR 5X100 NON BLADED Z-TH - READ KII (6/BX)

## (undated) DEVICE — SENSOR SPO2 NEO LNCS ADHESIVE (20/BX) SEE USER NOTES

## (undated) DEVICE — SLEEVE, VASO, THIGH, MED

## (undated) DEVICE — SUTURE 4-0 PROLENE SH 36 (36PK/BX)"

## (undated) DEVICE — STAPLE 75MM LINEAR (12EA/BX)

## (undated) DEVICE — SUTURE 4-0 MONOCRYL PLUS PS-1 - 27 INCH (36/BX)

## (undated) DEVICE — HEAD HOLDER JUNIOR/ADULT

## (undated) DEVICE — SUCTION INSTRUMENT YANKAUER BULBOUS TIP W/O VENT (50EA/CA)

## (undated) DEVICE — SPONGE PEANUT - (5/PK 50PK/CA)

## (undated) DEVICE — LACTATED RINGERS INJ 1000 ML - (14EA/CA 60CA/PF)

## (undated) DEVICE — SET FLUID WARMING STANDARD FLOW - (10/CA)

## (undated) DEVICE — NEPTUNE 4 PORT MANIFOLD - (20/PK)

## (undated) DEVICE — GLOVE BIOGEL SZ 8 SURGICAL PF LTX - (50PR/BX 4BX/CA)

## (undated) DEVICE — TUBE CONNECT SUCTION CLEAR 120 X 1/4" (50EA/CA)"

## (undated) DEVICE — TUBING CLEARLINK DUO-VENT - C-FLO (48EA/CA)

## (undated) DEVICE — SUTURE 1 PDS II PLUS TP-1 - (12PK/BX)

## (undated) DEVICE — SUTURE 2-0 SILK SH 30 IN C/R (12PK/BX)

## (undated) DEVICE — SUTURE 2-0 SILK SH (36PK/BX)

## (undated) DEVICE — SUTURE 4-0 PROLENE RB-1 D/A 36 (36PK/BX)"

## (undated) DEVICE — GOWN WARMING STANDARD FLEX - (30/CA)

## (undated) DEVICE — GLOVE BIOGEL PI ORTHO SZ 7.5 PF LF (40PR/BX)

## (undated) DEVICE — KIT SURGIFLO W/OUT THROMBIN - (6EA/CA)

## (undated) DEVICE — PACK MAJOR BASIN - (2EA/CA)

## (undated) DEVICE — CANISTER SUCTION 3000ML MECHANICAL FILTER AUTO SHUTOFF MEDI-VAC NONSTERILE LF DISP  (40EA/CA)

## (undated) DEVICE — TROCAR Z THREAD12MM OPTICAL - NON BLADED (6/BX)

## (undated) DEVICE — SUTURE 0 PROLENE SH 30 (36PK/BX)"

## (undated) DEVICE — SODIUM CHL IRRIGATION 0.9% 1000ML (12EA/CA)

## (undated) DEVICE — KIT 2.25IN COL ILSTM 2 PC DRN - 57MM 2 1/4 INCH THIS IS FOR THE OR ONLY (5/BX)

## (undated) DEVICE — GOWN SURGICAL X-LARGE ULTRA - FILM-REINFORCED (20/CA)

## (undated) DEVICE — PROTECTOR ULNA NERVE - (36PR/CA)

## (undated) DEVICE — BLADE SURGICAL CLIPPER - (50EA/CA)

## (undated) DEVICE — BLADE SURGICAL #15 - (50/BX 3BX/CA)

## (undated) DEVICE — DRAPE LAPAROTOMY T SHEET - (12EA/CA)

## (undated) DEVICE — SUTURE GENERAL

## (undated) DEVICE — STAPLER SKIN DISP - (6/BX 10BX/CA) VISISTAT

## (undated) DEVICE — TUBE E-T HI-LO CUFF 7.5MM (10EA/PK)

## (undated) DEVICE — SET LEADWIRE 5 LEAD BEDSIDE DISPOSABLE ECG (1SET OF 5/EA)

## (undated) DEVICE — Device

## (undated) DEVICE — KIT ANESTHESIA W/CIRCUIT & 3/LT BAG W/FILTER (20EA/CA)

## (undated) DEVICE — PACK LAP CHOLE OR - (2EA/CA)

## (undated) DEVICE — GLOVE BIOGEL SZ 8.5 SURGICAL PF LTX - (50PR/BX 4BX/CA)

## (undated) DEVICE — ELECTRODE DUAL RETURN W/ CORD - (50/PK)

## (undated) DEVICE — BLANKET WARMING LOWER BODY - (10/CA) INACTIVE USE #8585

## (undated) DEVICE — TUBE E-T HI-LO CUFF 7.0MM (10EA/PK)

## (undated) DEVICE — SPONGE GAUZESTER 4 X 4 4PLY - (128PK/CA)

## (undated) DEVICE — SUTURE 3-0 VICRYL PLUS SH - 8X 18 INCH (12/BX)

## (undated) DEVICE — CLIP MED LG INTNL HRZN TI ESCP - (20/BX)

## (undated) DEVICE — WATER IRRIGATION STERILE 1000ML (12EA/CA)

## (undated) DEVICE — CLIP LG INTNL HRZN TI ESCP LGT - (20/BX)

## (undated) DEVICE — SYSTEM PREVENA INCISION MNGM - (1/EA)

## (undated) DEVICE — TRAY ANESTHESIA - CONTINUOUS EPIDURAL (10EA/CA) THIS IS A CUSTOM PACK

## (undated) DEVICE — TRAY SURESTEP FOLEY TEMP SENSING 16FR (10EA/CA) ORDER  #18764 FOR TEMP FOLEY ONLY

## (undated) DEVICE — CLIP MED INTNL HRZN TI ESCP - (25/BX)

## (undated) DEVICE — SURGIFOAM (SIZE 100) - (6EA/CA)

## (undated) DEVICE — KIT ROOM DECONTAMINATION

## (undated) DEVICE — ELECTRODE 850 FOAM ADHESIVE - HYDROGEL RADIOTRNSPRNT (50/PK)

## (undated) DEVICE — GLOVE SZ 7.5 BIOGEL PI MICRO - PF LF (50PR/BX)

## (undated) DEVICE — CHLORAPREP 26 ML APPLICATOR - ORANGE TINT(25/CA)

## (undated) DEVICE — PROBE SOLERO MICROWAVE APPLICATOR 14CM

## (undated) DEVICE — PAD LAP STERILE 18 X 18 - (5/PK 40PK/CA)

## (undated) DEVICE — SUTURE 1 VICRYL PLUS CTX - 8 X 18 INCH (12/BX)

## (undated) DEVICE — SET SUCTION/IRRIGATION WITH DISPOSABLE TIP (6/CA )PART #0250-070-520 IS A SUB

## (undated) DEVICE — DRAPE IOBAN II INCISE 23X17 - (10EA/BX 4BX/CA)

## (undated) DEVICE — DRESSING TRANSPARENT FILM TEGADERM 2.375 X 2.75"  (100EA/BX)"

## (undated) DEVICE — TOWEL STOP TIMEOUT SAFETY FLAG (40EA/CA)

## (undated) DEVICE — BOVIE  BLADE 6 EXTENDED - (50/PK)

## (undated) DEVICE — GLOVE BIOGEL SZ 7.5 SURGICAL PF LTX - (50PR/BX 4BX/CA)

## (undated) DEVICE — SUTURE 3-0 PROLENE RB-1 D/A 36 (36PK/BX)"

## (undated) DEVICE — VESSELOOP MAXI BLUE STERILE- SURG-I-LOOP (10EA/BX)

## (undated) DEVICE — DERMABOND ADVANCED - (12EA/BX)

## (undated) DEVICE — DRAPE MEDI-SLUSH STERILE  - (40/CA)

## (undated) DEVICE — SUTURE 1 PDS-2 PLUS CTX - (24/BX)

## (undated) DEVICE — BLANKET WARMING UPPER BODY - (10/CA)

## (undated) DEVICE — PROBE SOLERO MICROWAVE APPLICATOR 29CM

## (undated) DEVICE — SPONGE XRAY 8X4 STERL. 12PL - (10EA/TY 80TY/CA)

## (undated) DEVICE — SUTURE 3-0 SILK SH (12PK/BX)

## (undated) DEVICE — BAG SPONGE COUNT 10.25 X 32 - BLUE (250/CA)

## (undated) DEVICE — DRAPESURG STERI-DRAPE LONG - (10/BX 4BX/CA)

## (undated) DEVICE — GLOVE BIOGEL INDICATOR SZ 8.5 SURGICAL PF LTX - (50/BX 4BX/CA)

## (undated) DEVICE — SUTURE 0 VICRYL PLUS UR-6 - 27 INCH (36/BX)

## (undated) DEVICE — STAPLER 75MM LINEAR OPEN (3EA/BX)

## (undated) DEVICE — TOWELS CLOTH SURGICAL - (4/PK 20PK/CA)